# Patient Record
Sex: FEMALE | Race: WHITE | NOT HISPANIC OR LATINO | Employment: OTHER | URBAN - METROPOLITAN AREA
[De-identification: names, ages, dates, MRNs, and addresses within clinical notes are randomized per-mention and may not be internally consistent; named-entity substitution may affect disease eponyms.]

---

## 2017-01-03 ENCOUNTER — OFFICE VISIT (OUTPATIENT)
Dept: PODIATRY | Facility: CLINIC | Age: 42
End: 2017-01-03
Payer: COMMERCIAL

## 2017-01-03 VITALS — HEART RATE: 84 BPM | OXYGEN SATURATION: 99 %

## 2017-01-03 DIAGNOSIS — S92.302D CLOSED NONDISPLACED FRACTURE OF METATARSAL BONE OF LEFT FOOT WITH ROUTINE HEALING, UNSPECIFIED METATARSAL, SUBSEQUENT ENCOUNTER: Primary | ICD-10-CM

## 2017-01-03 PROCEDURE — 99207 ZZC FRACTURE CARE IN GLOBAL PERIOD: CPT | Performed by: PODIATRIST

## 2017-01-03 NOTE — NURSING NOTE
"Chief Complaint   Patient presents with     Follow Up For     Fracture     left foot       Initial Pulse 84  Wt   SpO2 99% Estimated body mass index is 56.44 kg/(m^2) as calculated from the following:    Height as of 11/23/16: 1.626 m (5' 4\").    Weight as of 11/23/16: 149.233 kg (329 lb).  BP completed using cuff size: NA (Not Taken)  "

## 2017-01-03 NOTE — PATIENT INSTRUCTIONS
We wish you continued good healing. If you have any questions or concerns, please do not hesitate to contact us at [phone].      Please remember to call and schedule a follow up appointment if one was recommended at your earliest convenience.   PODIATRY CLINIC HOURS  TELEPHONE NUMBER    Dr. Ravin Arevalo D.P.M [facility]    Clinics:  [facility]        Shell Watson MA  Medical Assistant  Tuesday 1PM-6PM  [facility]  Wednesday 7AM-2PM  [place]  Thursday 10AM-6PM  [place] Friday 7AM-345PM  [place]  Specialty schedulers:   [phone] to make an appointment with any Specialty Provider.        Urgent Care locations:    [facility] Monday-Friday 5 pm - 9 pm. Saturday-Sunday 9 am -5pm    Monday-Friday 11 am - 9 pm Saturday 9 am - 5 pm     Monday-Sunday 12 noon-8PM [phone]     If you need a medication refill, please contact us you may need lab work and/or a follow up visit prior to your refill (i.e. Antifungal medications).    [facility] (secure e-mail communication and access to your chart) to send a message or to make an appointment.    If MRI needed please call Larry Canales at [phone]        Weight management plan: Patient was referred to their PCP to discuss a diet and exercise plan.

## 2017-01-06 NOTE — PROGRESS NOTES
DATE OF VISIT:      11/29/2016   Bess Enamorado was referred to me by Yanet Gutierres PA-C at the [facility] ED on 11/23/2016.  The patient was attempting to step up into a vehicle with her right foot when the left foot which was on the ground gave out resulting in her falling.  She is unsure if she fell on her foot.  She immediately had pain and she had a snapping sensation.  It is very difficult for her to bear any weight on this at all.  She states she has a history of MS and falls are exacerbated by chronic intermittent muscle spasm.  She also has a history of a peroneal tendon rupture on this foot which was evaluated by [facility] in the past and an MRI was gotten.  The patient has MS.  She also has an eating disorder and she is trying to lose weight.  At that time she was seen by [facility] and they felt that it was best for her to try to lose weight first and then possibly undergo repair of her peroneal tendon.  She has an Aircast on her left leg which she states is comfortable.     12/6/16  Patient could not get MRI as her foot was moving to much from her MS.  She is taking the keflex with no problems.  States erythema and edema is decreasing    1/3/17  Patient feeling much better.  Edema and pain decreased.            REVIEW OF SYSTEMS:  The patient denies any fever or chills or shortness of breath.  She denies calf pain.          Allergies   Allergen Reactions     Nkda [No Known Drug Allergies]        Current Outpatient Prescriptions   Medication Sig Dispense Refill     desogestrel-ethinyl estradiol (APRI) 0.15-30 MG-MCG per tablet Take 1 tablet by mouth daily Take as continuous daily dosing (omit placebo pills until after three months of hormone tablets.) 112 tablet 3     metFORMIN (GLUCOPHAGE-XR) 500 MG 24 hr tablet Take 2 tablets (1,000 mg) by mouth daily (with dinner) 180 tablet 1     PARoxetine (PAXIL) 30 MG tablet Take 1 tablet (30 mg) by mouth every morning ) 90 tablet 1      TECFIDERA 240 MG CPDR Take 240 mg by mouth 2 times daily Prescribed and monitored by neurologist       Cholecalciferol (VITAMIN D3 PO) Take 2,000 Units by mouth daily       ibuprofen (IBU) 600 MG tablet Take by mouth 3 times daily       order for DME Equipment being ordered: Wheelchair   ISRAEL:4 weeks  Pt is Non-Weight bearing at all times. 1 Device 0       Patient Active Problem List   Diagnosis     Multiple sclerosis (H)     Polycystic ovaries     Constipation     CARDIOVASCULAR SCREENING; LDL GOAL LESS THAN 160     Anxiety     Restless legs     Leg edema     Dyspnea and respiratory abnormality     Eating disorder     Papanicolaou smear of cervix with low grade squamous intraepithelial lesion (LGSIL)     Morbid obesity (H)     Peroneal tendon rupture     Benign essential hypertension       Past Medical History   Diagnosis Date     Multiple sclerosis (H) 8/29/2005 9/18/200pt dx with MS 2004--dx by neurolgist and is followed by Dr. Harris     UNSPEC CONSTIPATION 9/18/2006 9/18/2006   Has tried otc suppository and otc lax.      LSIL on Pap smear 7/2014     Neg high risk HPV     ASCUS favor benign 8/2015     Neg HPV     H/O colposcopy with cervical biopsy 9/14/15     Bx & ECC - negative       Past Surgical History   Procedure Laterality Date     Cholecystectomy, laporoscopic       Cholecystectomy, Laparoscopic     Open reduction internal fixation toe(s)  4/13/2012     Procedure:OPEN REDUCTION INTERNAL FIXATION TOE(S); Open reduction internal fixation right proximal fifth metatarsal fracture-   Anes-choice block; Surgeon:LEY, JEFFREY DUANE; Location:[place] OR       Family History   Problem Relation Age of Onset     Neurologic Disorder Father      MS     HEART DISEASE Father      irregular heart rate     DIABETES Father      Melanoma Father      CANCER Maternal Grandfather      lung     CANCER Paternal Grandmother      stomach     CANCER Mother      Gynecology Maternal Aunt      PCOS       Social History    Substance Use Topics     Smoking status: Never Smoker      Smokeless tobacco: Never Used     Alcohol Use: Yes      Comment: social             PHYSICAL EXAMINATION:  She is 5 feet 4 inches tall and is morbidly obese.  Her weight is 329 pounds.  She has a BMI of 56.44.She has an SpO2 of 99%.  She is very pleasant to talk with today and in no apparent distress.  She has significant edema around both of her ankles and feet.  Because of this it is difficult to palpate her PT pulses.  Her DP pulses are palpable.  Her toes are warm to touch and her capillary refill is less than 3 seconds in all digits.  Light touch is intact in the digits.  Achilles reflexes are equal and symmetrical bilaterally.  Her muscle strength is weak bilaterally.  The lateral compartment of the left ankle is weaker compared to the right.  No gross forefoot or rearfoot deformities are noted.  The right foot exam is unremarkable.  On her left foot pain over fracture sites significantly decreased and now minimal.  Edema greatly decreased.  No erythema or ecchymosis.         MRI LEFT FOOT WITHOUT CONTRAST  12/7/2016 1:03 PM     HISTORY: Three weeks of pain following an injury. Fractures of the  second, third, and fourth metatarsals. Possible Lisfranc ligament  injury.     COMPARISON: Radiographs on 12/6/2016.     TECHNIQUE: Sagittal, coronal, and transverse T1 and STIR images were  obtained through the left foot with attention given to the midfoot and  metatarsals.       FINDINGS: There are nondisplaced transverse fractures through the  bases of the second, third, and fourth metatarsals with associated  marrow edema. There is also an oblique fracture involving the lateral  base of the first metatarsal with similar marrow edema. There is  moderate marrow edema within the majority of the cuboid. Although a  distinct fracture line is not seen involving the bone surface, I  cannot exclude some microfracturing of the internal trabecula. No  other  fracture or osseous lesion is seen. There are mild degenerative  changes in several tarsometatarsal joints resulting in mild marrow  edema in the distal aspect of the medial and intermediate cuneiforms.  Similar degenerative marrow edema is seen in the proximal aspect of  the lateral cuneiform.     No other joint space pathology is seen. Specifically, there is no  actual widening of the Lisfranc joints with no definite ligament  disruption seen.     The tendons and ligaments around the foot are intact and unremarkable.  There is a prominent plantar calcaneal spur with chronic-appearing  thickening of the posterior fibers of the plantar fascia. No current  abnormal signal is seen within the fascia with no tear demonstrated.  No other soft tissue abnormality is seen.                                                                       IMPRESSION:  1. Nondisplaced fractures of the bases of the first, second, third,  and fourth metatarsals. Additional marrow edema in the cuboid may  represent a bone contusion.  2. Degenerative changes as described above.  3. Although the above-mentioned fractures are in the adjacent bones, I  see no additional abnormality of the Lisfranc joints with no definite  ligament tear seen.  4. Chronic appearing plantar fasciitis including a prominent lateral  calcaneal spur.       ASSESSMENT:   1.  Fractures of the first, second, third and fourth metatarsals.   2.  Multiple  sclerosis.      PLAN:  Patient healing nicely.  Patient will start tomorrow walking in cam walker.  If there is no pain or edema then will slowly increase time in shoe 1-2 hours per day until walking all day.  If patient has pain or edema back to [facility] for 5 days and then will try again.  Return to clinic in 3 weeks.  Fracture care.            JULIA MIGUEL DPM

## 2017-01-26 ENCOUNTER — RADIANT APPOINTMENT (OUTPATIENT)
Dept: GENERAL RADIOLOGY | Facility: CLINIC | Age: 42
End: 2017-01-26
Attending: PODIATRIST
Payer: COMMERCIAL

## 2017-01-26 ENCOUNTER — OFFICE VISIT (OUTPATIENT)
Dept: PODIATRY | Facility: CLINIC | Age: 42
End: 2017-01-26
Payer: COMMERCIAL

## 2017-01-26 VITALS — HEIGHT: 64 IN | HEART RATE: 120 BPM | OXYGEN SATURATION: 99 %

## 2017-01-26 DIAGNOSIS — S92.302D CLOSED NONDISPLACED FRACTURE OF METATARSAL BONE OF LEFT FOOT WITH ROUTINE HEALING, UNSPECIFIED METATARSAL, SUBSEQUENT ENCOUNTER: Primary | ICD-10-CM

## 2017-01-26 DIAGNOSIS — M79.672 LEFT FOOT PAIN: ICD-10-CM

## 2017-01-26 DIAGNOSIS — S92.302D CLOSED NONDISPLACED FRACTURE OF METATARSAL BONE OF LEFT FOOT WITH ROUTINE HEALING, UNSPECIFIED METATARSAL, SUBSEQUENT ENCOUNTER: ICD-10-CM

## 2017-01-26 DIAGNOSIS — Q66.50 CONGENITAL PES PLANUS, UNSPECIFIED LATERALITY: ICD-10-CM

## 2017-01-26 PROCEDURE — 99207 ZZC FRACTURE CARE IN GLOBAL PERIOD: CPT | Performed by: PODIATRIST

## 2017-01-26 PROCEDURE — 73630 X-RAY EXAM OF FOOT: CPT | Mod: LT

## 2017-01-26 NOTE — NURSING NOTE
"Chief Complaint   Patient presents with     Follow Up For     Fracture     1,2,3,4th metatarsals       Initial Pulse 120  Ht 1.626 m (5' 4\")  SpO2 99% Estimated body mass index is 56.44 kg/(m^2) as calculated from the following:    Height as of this encounter: 1.626 m (5' 4\").    Weight as of 11/23/16: 149.233 kg (329 lb).  BP completed using cuff size: NA (Not Taken)  "

## 2017-01-26 NOTE — PROGRESS NOTES
DATE OF VISIT:      11/29/2016   Bess Enamorado was referred to me by Yanet Gutierres PA-C at the [facility] ED on 11/23/2016.  The patient was attempting to step up into a vehicle with her right foot when the left foot which was on the ground gave out resulting in her falling.  She is unsure if she fell on her foot.  She immediately had pain and she had a snapping sensation.  It is very difficult for her to bear any weight on this at all.  She states she has a history of MS and falls are exacerbated by chronic intermittent muscle spasm.  She also has a history of a peroneal tendon rupture on this foot which was evaluated by [facility] in the past and an MRI was gotten.  The patient has MS.  She also has an eating disorder and she is trying to lose weight.  At that time she was seen by [facility] and they felt that it was best for her to try to lose weight first and then possibly undergo repair of her peroneal tendon.  She has an Aircast on her left leg which she states is comfortable.     12/6/16  Patient could not get MRI as her foot was moving to much from her MS.  She is taking the keflex with no problems.  States erythema and edema is decreasing    1/3/17  Patient feeling much better.  Edema and pain decreased.       1/26/17  Patient states pain around fracture sites really does not bother her now.  Most of her pain is lateral ankle.  Aggravated by activity and relieved by rest.   There was some question as to weather she ruptured one of her peroneal tendons in the past and MRI inconclusive.  She has lateral pain.  She has MS and she states she did have a classic AFO on this foot at one time, but that was a few years ago.  She has also been going to physical therapy for her lateral foot pain.  She just wears regular shoes and does not have orthotics.  She is walking in an air cast now and states this is comfortable.           REVIEW OF SYSTEMS:  The patient denies any fever or chills or  shortness of breath.  She denies calf pain.          Allergies   Allergen Reactions     Nkda [No Known Drug Allergies]        Current Outpatient Prescriptions   Medication Sig Dispense Refill     desogestrel-ethinyl estradiol (APRI) 0.15-30 MG-MCG per tablet Take 1 tablet by mouth daily Take as continuous daily dosing (omit placebo pills until after three months of hormone tablets.) 112 tablet 3     metFORMIN (GLUCOPHAGE-XR) 500 MG 24 hr tablet Take 2 tablets (1,000 mg) by mouth daily (with dinner) 180 tablet 1     PARoxetine (PAXIL) 30 MG tablet Take 1 tablet (30 mg) by mouth every morning ) 90 tablet 1     TECFIDERA 240 MG CPDR Take 240 mg by mouth 2 times daily Prescribed and monitored by neurologist       Cholecalciferol (VITAMIN D3 PO) Take 2,000 Units by mouth daily       ibuprofen (IBU) 600 MG tablet Take by mouth 3 times daily       order for DME Equipment being ordered: Wheelchair   ISRAEL:4 weeks  Pt is Non-Weight bearing at all times. 1 Device 0       Patient Active Problem List   Diagnosis     Multiple sclerosis (H)     Polycystic ovaries     Constipation     CARDIOVASCULAR SCREENING; LDL GOAL LESS THAN 160     Anxiety     Restless legs     Leg edema     Dyspnea and respiratory abnormality     Eating disorder     Papanicolaou smear of cervix with low grade squamous intraepithelial lesion (LGSIL)     Morbid obesity (H)     Peroneal tendon rupture     Benign essential hypertension       Past Medical History   Diagnosis Date     Multiple sclerosis (H) 8/29/2005 9/18/200pt dx with MS 2004--dx by neurolgist and is followed by Dr. Harris     UNSPEC CONSTIPATION 9/18/2006 9/18/2006   Has tried otc suppository and otc lax.      LSIL on Pap smear 7/2014     Neg high risk HPV     ASCUS favor benign 8/2015     Neg HPV     H/O colposcopy with cervical biopsy 9/14/15     Bx & ECC - negative       Past Surgical History   Procedure Laterality Date     Cholecystectomy, laporoscopic       Cholecystectomy,  Laparoscopic     Open reduction internal fixation toe(s)  4/13/2012     Procedure:OPEN REDUCTION INTERNAL FIXATION TOE(S); Open reduction internal fixation right proximal fifth metatarsal fracture-   Anes-choice block; Surgeon:LEY, JEFFREY DUANE; Location:[place] OR       Family History   Problem Relation Age of Onset     Neurologic Disorder Father      MS     HEART DISEASE Father      irregular heart rate     DIABETES Father      Melanoma Father      CANCER Maternal Grandfather      lung     CANCER Paternal Grandmother      stomach     CANCER Mother      Gynecology Maternal Aunt      PCOS       Social History   Substance Use Topics     Smoking status: Never Smoker      Smokeless tobacco: Never Used     Alcohol Use: Yes      Comment: social             PHYSICAL EXAMINATION:  She is 5 feet 4 inches tall and is morbidly obese.  Her weight is 329 pounds.  She has a BMI of 56.44.She has an SpO2 of 99%.  She is very pleasant to talk with today and in no apparent distress.  She has significant edema around both of her ankles and feet.  Because of this it is difficult to palpate her PT pulses.  Her DP pulses are palpable.  Her toes are warm to touch and her capillary refill is less than 3 seconds in all digits.  Light touch is intact in the digits.  Achilles reflexes are equal and symmetrical bilaterally.  Her muscle strength is weak bilaterally.  The lateral compartment of the left ankle is weaker compared to the right.  No gross forefoot or rearfoot deformities are noted.  The right foot exam is unremarkable.  On her left foot pain over fracture sites pain significantly decreased and now minimal.  Edema greatly decreased.  No erythema or ecchymosis.  With significant loading of 1-4 met heads no pan at all, and used a lot of force since she has strong Achilles.  Can get to 5 degrees past dorsiflexion with time and effort, but again no pain with doing this.  Lateral pain in sinus tarsi and lateral ankle.  No pain with stress  either peroneal tendon.             MRI LEFT FOOT WITHOUT CONTRAST  12/7/2016 1:03 PM     HISTORY: Three weeks of pain following an injury. Fractures of the  second, third, and fourth metatarsals. Possible Lisfranc ligament  injury.     COMPARISON: Radiographs on 12/6/2016.     TECHNIQUE: Sagittal, coronal, and transverse T1 and STIR images were  obtained through the left foot with attention given to the midfoot and  metatarsals.       FINDINGS: There are nondisplaced transverse fractures through the  bases of the second, third, and fourth metatarsals with associated  marrow edema. There is also an oblique fracture involving the lateral  base of the first metatarsal with similar marrow edema. There is  moderate marrow edema within the majority of the cuboid. Although a  distinct fracture line is not seen involving the bone surface, I  cannot exclude some microfracturing of the internal trabecula. No  other fracture or osseous lesion is seen. There are mild degenerative  changes in several tarsometatarsal joints resulting in mild marrow  edema in the distal aspect of the medial and intermediate cuneiforms.  Similar degenerative marrow edema is seen in the proximal aspect of  the lateral cuneiform.     No other joint space pathology is seen. Specifically, there is no  actual widening of the Lisfranc joints with no definite ligament  disruption seen.     The tendons and ligaments around the foot are intact and unremarkable.  There is a prominent plantar calcaneal spur with chronic-appearing  thickening of the posterior fibers of the plantar fascia. No current  abnormal signal is seen within the fascia with no tear demonstrated.  No other soft tissue abnormality is seen.                                                                       IMPRESSION:  1. Nondisplaced fractures of the bases of the first, second, third,  and fourth metatarsals. Additional marrow edema in the cuboid may  represent a bone contusion.  2.  Degenerative changes as described above.  3. Although the above-mentioned fractures are in the adjacent bones, I  see no additional abnormality of the Lisfranc joints with no definite  ligament tear seen.  4. Chronic appearing plantar fasciitis including a prominent lateral  calcaneal spur.       ASSESSMENT:   1.  Fractures of the first, second, third and fourth metatarsals.   2.  Multiple  Sclerosis  3.  Lateral foot pain tendon rupture vs sinus tarsi syndrome from pronation      PLAN:  Fractures continue to heal and clinically no pain.  Will increase walking and will start walking in shoe once no pain in cam walker.  Called radiologist to discussed last 2 MRIs.  He notes not 100% clear if peroneus brevis tendon was ruptured, but is does disappear at some point in most recent MRI.  Edema in lateral malleolus consistent with sinus tarsi/lateral impingement.  Dispensed F8 ankle brace to see if this helps lateral pain.  Rx for [place] AFO to help with lateral pain and foot stability.   Return to clinic in 6 weeks.  25 minutes spent in total care of this patient for lateral pain ad half this time spent in face to face discussion.  Fracture care.          JULIA MIGUEL DPM

## 2017-01-26 NOTE — PATIENT INSTRUCTIONS
We wish you continued good healing. If you have any questions or concerns, please do not hesitate to contact us at [phone].      Please remember to call and schedule a follow up appointment if one was recommended at your earliest convenience.   PODIATRY CLINIC HOURS  TELEPHONE NUMBER    Dr. Ravin Arevalo D.P.M [facility]    Clinics:  [facility]        Shell Watson MA  Medical Assistant  Tuesday 1PM-6PM  [facility]  Wednesday 7AM-2PM  [place]  Thursday 10AM-6PM  [place] Friday 7AM-345PM  [place]  Specialty schedulers:   [phone] to make an appointment with any Specialty Provider.        Urgent Care locations:    [facility] Monday-Friday 5 pm - 9 pm. Saturday-Sunday 9 am -5pm    Monday-Friday 11 am - 9 pm Saturday 9 am - 5 pm     Monday-Sunday 12 noon-8PM [phone]     If you need a medication refill, please contact us you may need lab work and/or a follow up visit prior to your refill (i.e. Antifungal medications).    [facility] (secure e-mail communication and access to your chart) to send a message or to make an appointment.    If MRI needed please call Larry Canales at [phone]        Weight management plan: Patient was referred to their PCP to discuss a diet and exercise plan.

## 2017-03-09 ENCOUNTER — OFFICE VISIT (OUTPATIENT)
Dept: PODIATRY | Facility: CLINIC | Age: 42
End: 2017-03-09
Payer: COMMERCIAL

## 2017-03-09 VITALS — WEIGHT: 293 LBS | BODY MASS INDEX: 50.02 KG/M2 | HEIGHT: 64 IN

## 2017-03-09 DIAGNOSIS — S92.302D CLOSED NONDISPLACED FRACTURE OF METATARSAL BONE OF LEFT FOOT WITH ROUTINE HEALING, UNSPECIFIED METATARSAL, SUBSEQUENT ENCOUNTER: Primary | ICD-10-CM

## 2017-03-09 DIAGNOSIS — M79.672 LEFT FOOT PAIN: ICD-10-CM

## 2017-03-09 DIAGNOSIS — Q66.50 CONGENITAL PES PLANUS, UNSPECIFIED LATERALITY: ICD-10-CM

## 2017-03-09 PROCEDURE — 99213 OFFICE O/P EST LOW 20 MIN: CPT | Performed by: PODIATRIST

## 2017-03-09 NOTE — MR AVS SNAPSHOT
After Visit Summary   3/9/2017    Bess Enamorado    MRN: [id number]           Patient Information     Date Of Birth          1975        Visit Information        Provider Department      3/9/2017 1:00 PM Ravin Arevalo, DPM [facility]        Today's Diagnoses     Closed nondisplaced fracture of metatarsal bone of left foot with routine healing, unspecified metatarsal, subsequent encounter    -  1    Congenital pes planus, unspecified laterality        Left foot pain          Care Instructions    We wish you continued good healing. If you have any questions or concerns, please do not hesitate to contact us at [phone].      Please remember to call and schedule a follow up appointment if one was recommended at your earliest convenience.   PODIATRY CLINIC HOURS  TELEPHONE NUMBER    Dr. Ravin SHORTPANDRESSA [facility]    Clinics:  [place]  [place]  [facility]        Shell Watson MA  Medical Assistant  Tuesday 1PM-6PM  [place]  Wednesday 7AM-2PM  [place]  Thursday 10AM-6PM  [place]  Friday 7AM-345PM  [place]  Specialty schedulers:   [phone] to make an appointment with any Specialty Provider.        Urgent Care locations:    [facility] Monday-Friday 5 pm - 9 pm. Saturday-Sunday 9 am -5pm    Monday-Friday 11 am - 9 pm Saturday 9 am - 5 pm     Monday-Sunday 12 noon-8PM [phone]     If you need a medication refill, please contact us you may need lab work and/or a follow up visit prior to your refill (i.e. Antifungal medications).    [employer] (secure e-mail communication and access to your chart) to send a message or to make an appointment.    If MRI needed please call Larry Canales at [phone]        Weight management plan: Patient was referred to their PCP to discuss a diet and exercise plan.          Follow-ups after your visit        Who  "to contact     If you have questions or need follow up information about today's clinic visit or your schedule please contact [facility] directly at [phone].  Normal or non-critical lab and imaging results will be communicated to you by MyChart, letter or phone within 4 business days after the clinic has received the results. If you do not hear from us within 7 days, please contact the clinic through MyChart or phone. If you have a critical or abnormal lab result, we will notify you by phone as soon as possible.  Submit refill requests through [employer] or call your pharmacy and they will forward the refill request to us. Please allow 3 business days for your refill to be completed.          Additional Information About Your Visit        [employer] Information     [employer] gives you secure access to your electronic health record. If you see a primary care provider, you can also send messages to your care team and make appointments. If you have questions, please call your primary care clinic.  If you do not have a primary care provider, please call [phone] and they will assist you.        Care EveryWhere ID     This is your Care EveryWhere ID. This could be used by other organizations to access your [facility] medical records  [id number]        Your Vitals Were     Height BMI (Body Mass Index)                1.626 m (5' 4\") 55.61 kg/m2           Blood Pressure from Last 3 Encounters:   11/23/16 163/90   10/26/16 140/82   10/20/16 (!) 148/92    Weight from Last 3 Encounters:   03/09/17 (!) 147 kg (324 lb)   11/23/16 (!) 149.2 kg (329 lb)   10/20/16 (!) 158.8 kg (350 lb)              Today, you had the following     No orders found for display       Primary Care Provider Office Phone # Fax #    Jennifer Crowe [phone]       [facility] [address] [address] [facility] [address]        Thank you!     Thank you for choosing [facility]  for your care. Our " goal is always to provide you with excellent care. Hearing back from our patients is one way we can continue to improve our services. Please take a few minutes to complete the written survey that you may receive in the mail after your visit with us. Thank you!             Your Updated Medication List - Protect others around you: Learn how to safely use, store and throw away your medicines at www.disposemymeds.org.          This list is accurate as of: 3/9/17  1:09 PM.  Always use your most recent med list.                   Brand Name Dispense Instructions for use    desogestrel-ethinyl estradiol 0.15-30 MG-MCG per tablet    APRI    112 tablet    Take 1 tablet by mouth daily Take as continuous daily dosing (omit placebo pills until after three months of hormone tablets.)        MG tablet   Generic drug:  ibuprofen      Take by mouth 3 times daily       metFORMIN 500 MG 24 hr tablet    GLUCOPHAGE-XR    180 tablet    Take 2 tablets (1,000 mg) by mouth daily (with dinner)       order for DME     1 Device    Equipment being ordered: Wheelchair  ISRAEL:4 weeks Pt is Non-Weight bearing at all times.       PARoxetine 30 MG tablet    PAXIL    90 tablet    Take 1 tablet (30 mg) by mouth every morning )       TECFIDERA 240 MG Cpdr   Generic drug:  dimethyl fumarate      Take 240 mg by mouth 2 times daily Prescribed and monitored by neurologist       VITAMIN D3 PO      Take 2,000 Units by mouth daily

## 2017-03-09 NOTE — PROGRESS NOTES
DATE OF VISIT:      11/29/2016   Bess Enamorado was referred to me by Yanet Gutierres PA-C at the [facility] ED on 11/23/2016.  The patient was attempting to step up into a vehicle with her right foot when the left foot which was on the ground gave out resulting in her falling.  She is unsure if she fell on her foot.  She immediately had pain and she had a snapping sensation.  It is very difficult for her to bear any weight on this at all.  She states she has a history of MS and falls are exacerbated by chronic intermittent muscle spasm.  She also has a history of a peroneal tendon rupture on this foot which was evaluated by [facility] in the past and an MRI was gotten.  The patient has MS.  She also has an eating disorder and she is trying to lose weight.  At that time she was seen by [facility] and they felt that it was best for her to try to lose weight first and then possibly undergo repair of her peroneal tendon.  She has an Aircast on her left leg which she states is comfortable.     12/6/16  Patient could not get MRI as her foot was moving to much from her MS.  She is taking the keflex with no problems.  States erythema and edema is decreasing    1/3/17  Patient feeling much better.  Edema and pain decreased.       1/26/17  Patient states pain around fracture sites really does not bother her now.  Most of her pain is lateral ankle.  Aggravated by activity and relieved by rest.   There was some question as to weather she ruptured one of her peroneal tendons in the past and MRI inconclusive.  She has lateral pain.  She has MS and she states she did have a classic AFO on this foot at one time, but that was a few years ago.  She has also been going to physical therapy for her lateral foot pain.  She just wears regular shoes and does not have orthotics.  She is walking in an air cast now and states this is comfortable.    3/9/17  Patient has had AFO for 1 week.  It is very comfortable and no  pain with walking in this and fits into her shoes well.  Edema and pain at fracture sites gone.  Still some lateral pain but she thinks this is improving.             REVIEW OF SYSTEMS:  The patient denies any fever or chills or shortness of breath.  She denies calf pain.          Allergies   Allergen Reactions     Nkda [No Known Drug Allergies]        Current Outpatient Prescriptions   Medication Sig Dispense Refill     desogestrel-ethinyl estradiol (APRI) 0.15-30 MG-MCG per tablet Take 1 tablet by mouth daily Take as continuous daily dosing (omit placebo pills until after three months of hormone tablets.) 112 tablet 3     metFORMIN (GLUCOPHAGE-XR) 500 MG 24 hr tablet Take 2 tablets (1,000 mg) by mouth daily (with dinner) 180 tablet 1     PARoxetine (PAXIL) 30 MG tablet Take 1 tablet (30 mg) by mouth every morning ) 90 tablet 1     TECFIDERA 240 MG CPDR Take 240 mg by mouth 2 times daily Prescribed and monitored by neurologist       Cholecalciferol (VITAMIN D3 PO) Take 2,000 Units by mouth daily       ibuprofen (IBU) 600 MG tablet Take by mouth 3 times daily       order for DME Equipment being ordered: Wheelchair   ISRAEL:4 weeks  Pt is Non-Weight bearing at all times. 1 Device 0       Patient Active Problem List   Diagnosis     Multiple sclerosis (H)     Polycystic ovaries     Constipation     CARDIOVASCULAR SCREENING; LDL GOAL LESS THAN 160     Anxiety     Restless legs     Leg edema     Dyspnea and respiratory abnormality     Eating disorder     Papanicolaou smear of cervix with low grade squamous intraepithelial lesion (LGSIL)     Morbid obesity (H)     Peroneal tendon rupture     Benign essential hypertension       Past Medical History   Diagnosis Date     ASCUS favor benign 8/2015     Neg HPV     H/O colposcopy with cervical biopsy 9/14/15     Bx & ECC - negative     LSIL on Pap smear 7/2014     Neg high risk HPV     Multiple sclerosis (H) 8/29/2005 9/18/200pt dx with MS 2004--dx by neurolgist and is  followed by Dr. Harris     UNSPEC CONSTIPATION 9/18/2006 9/18/2006   Has tried otc suppository and otc lax.        Past Surgical History   Procedure Laterality Date     Cholecystectomy, laporoscopic       Cholecystectomy, Laparoscopic     Open reduction internal fixation toe(s)  4/13/2012     Procedure:OPEN REDUCTION INTERNAL FIXATION TOE(S); Open reduction internal fixation right proximal fifth metatarsal fracture-   Anes-choice block; Surgeon:LEY, JEFFREY DUANE; Location:[place] OR       Family History   Problem Relation Age of Onset     Neurologic Disorder Father      MS     HEART DISEASE Father      irregular heart rate     DIABETES Father      Melanoma Father      CANCER Maternal Grandfather      lung     CANCER Paternal Grandmother      stomach     CANCER Mother      Gynecology Maternal Aunt      PCOS       Social History   Substance Use Topics     Smoking status: Never Smoker     Smokeless tobacco: Never Used     Alcohol use Yes      Comment: social             PHYSICAL EXAMINATION:  She is 5 feet 4 inches tall and is morbidly obese.  Her weight is 329 pounds.  She has a BMI of 56.44.She has an SpO2 of 99%.  She is very pleasant to talk with today and in no apparent distress.  She has significant edema around both of her ankles and feet.  Because of this it is difficult to palpate her PT pulses.  Her DP pulses are palpable.  Her toes are warm to touch and her capillary refill is less than 3 seconds in all digits.  Light touch is intact in the digits.  Achilles reflexes are equal and symmetrical bilaterally.  Her muscle strength is weak bilaterally.  The lateral compartment of the left ankle is weaker compared to the right.  No gross forefoot or rearfoot deformities are noted.  The right foot exam is unremarkable.  On her left foot pain over fracture sites no pain or edema now.   Can get to 5 degrees past dorsiflexion of ankle with time and effort, but again no pain with doing this.  Lateral pain in sinus  tarsi and lateral ankle, but slight today.  No pain with stress either peroneal tendon.             MRI LEFT FOOT WITHOUT CONTRAST  12/7/2016 1:03 PM     HISTORY: Three weeks of pain following an injury. Fractures of the  second, third, and fourth metatarsals. Possible Lisfranc ligament  injury.     COMPARISON: Radiographs on 12/6/2016.     TECHNIQUE: Sagittal, coronal, and transverse T1 and STIR images were  obtained through the left foot with attention given to the midfoot and  metatarsals.       FINDINGS: There are nondisplaced transverse fractures through the  bases of the second, third, and fourth metatarsals with associated  marrow edema. There is also an oblique fracture involving the lateral  base of the first metatarsal with similar marrow edema. There is  moderate marrow edema within the majority of the cuboid. Although a  distinct fracture line is not seen involving the bone surface, I  cannot exclude some microfracturing of the internal trabecula. No  other fracture or osseous lesion is seen. There are mild degenerative  changes in several tarsometatarsal joints resulting in mild marrow  edema in the distal aspect of the medial and intermediate cuneiforms.  Similar degenerative marrow edema is seen in the proximal aspect of  the lateral cuneiform.     No other joint space pathology is seen. Specifically, there is no  actual widening of the Lisfranc joints with no definite ligament  disruption seen.     The tendons and ligaments around the foot are intact and unremarkable.  There is a prominent plantar calcaneal spur with chronic-appearing  thickening of the posterior fibers of the plantar fascia. No current  abnormal signal is seen within the fascia with no tear demonstrated.  No other soft tissue abnormality is seen.                                                                       IMPRESSION:  1. Nondisplaced fractures of the bases of the first, second, third,  and fourth metatarsals. Additional  marrow edema in the cuboid may  represent a bone contusion.  2. Degenerative changes as described above.  3. Although the above-mentioned fractures are in the adjacent bones, I  see no additional abnormality of the Lisfranc joints with no definite  ligament tear seen.  4. Chronic appearing plantar fasciitis including a prominent lateral  calcaneal spur.       ASSESSMENT:   1.  Fractures of the first, second, third and fourth metatarsals, now healed.   2.  Multiple  Sclerosis  3.  Lateral foot pain tendon rupture vs sinus tarsi syndrome from pronation      PLAN:  Fractures healed.  Continue [place] AFO at all times to help with lateral pain and foot stability.   Will call if she needs new [place] AFO in the future.  Return to clinic prn.          JULIA MIGUEL DPM

## 2017-03-09 NOTE — PATIENT INSTRUCTIONS
We wish you continued good healing. If you have any questions or concerns, please do not hesitate to contact us at [phone].      Please remember to call and schedule a follow up appointment if one was recommended at your earliest convenience.   PODIATRY CLINIC HOURS  TELEPHONE NUMBER    Dr. Ravin Arevalo D.P.[facility]    Clinics:  [facility]        Shell Watson MA  Medical Assistant  Tuesday 1PM-6PM  [facility]  Wednesday 7AM-2PM  [place]  Thursday 10AM-6PM  [place] Friday 7AM-345PM  [place]  Specialty schedulers:   [phone] to make an appointment with any Specialty Provider.        Urgent Care locations:    [facility] Monday-Friday 5 pm - 9 pm. Saturday-Sunday 9 am -5pm    Monday-Friday 11 am - 9 pm Saturday 9 am - 5 pm     Monday-Sunday 12 noon-8PM [phone]     If you need a medication refill, please contact us you may need lab work and/or a follow up visit prior to your refill (i.e. Antifungal medications).    [employer] (secure e-mail communication and access to your chart) to send a message or to make an appointment.    If MRI needed please call Larry Canales at [phone]        Weight management plan: Patient was referred to their PCP to discuss a diet and exercise plan.

## 2017-03-09 NOTE — NURSING NOTE
"Chief Complaint   Patient presents with     Follow Up For     Fracture     left foot       Initial Ht 1.626 m (5' 4\")  Wt (!) 147 kg (324 lb)  BMI 55.61 kg/m2 Estimated body mass index is 55.61 kg/(m^2) as calculated from the following:    Height as of this encounter: 1.626 m (5' 4\").    Weight as of this encounter: 147 kg (324 lb).  Medication Reconciliation: complete  "

## 2017-03-21 DIAGNOSIS — Z79.899 NEED FOR PROPHYLACTIC CHEMOTHERAPY: ICD-10-CM

## 2017-03-21 DIAGNOSIS — G35 MULTIPLE SCLEROSIS (H): ICD-10-CM

## 2017-03-21 LAB
ALT SERPL W P-5'-P-CCNC: 54 U/L (ref 0–50)
AST SERPL W P-5'-P-CCNC: 29 U/L (ref 0–45)
BASOPHILS # BLD AUTO: 0 10E9/L (ref 0–0.2)
BASOPHILS NFR BLD AUTO: 0.6 %
CALCIUM SERPL-MCNC: 9.3 MG/DL (ref 8.5–10.1)
CREAT SERPL-MCNC: 0.66 MG/DL (ref 0.52–1.04)
DIFFERENTIAL METHOD BLD: NORMAL
EOSINOPHIL # BLD AUTO: 0.2 10E9/L (ref 0–0.7)
EOSINOPHIL NFR BLD AUTO: 2.4 %
ERYTHROCYTE [DISTWIDTH] IN BLOOD BY AUTOMATED COUNT: 13.7 % (ref 10–15)
GFR SERPL CREATININE-BSD FRML MDRD: NORMAL ML/MIN/1.7M2
HCT VFR BLD AUTO: 42.4 % (ref 35–47)
HGB BLD-MCNC: 13.9 G/DL (ref 11.7–15.7)
LYMPHOCYTES # BLD AUTO: 0.8 10E9/L (ref 0.8–5.3)
LYMPHOCYTES NFR BLD AUTO: 13.5 %
MCH RBC QN AUTO: 28 PG (ref 26.5–33)
MCHC RBC AUTO-ENTMCNC: 32.8 G/DL (ref 31.5–36.5)
MCV RBC AUTO: 86 FL (ref 78–100)
MONOCYTES # BLD AUTO: 0.6 10E9/L (ref 0–1.3)
MONOCYTES NFR BLD AUTO: 9.4 %
NEUTROPHILS # BLD AUTO: 4.6 10E9/L (ref 1.6–8.3)
NEUTROPHILS NFR BLD AUTO: 74.1 %
PLATELET # BLD AUTO: 247 10E9/L (ref 150–450)
RBC # BLD AUTO: 4.96 10E12/L (ref 3.8–5.2)
WBC # BLD AUTO: 6.2 10E9/L (ref 4–11)

## 2017-03-21 PROCEDURE — 82565 ASSAY OF CREATININE: CPT | Performed by: PSYCHIATRY & NEUROLOGY

## 2017-03-21 PROCEDURE — 84450 TRANSFERASE (AST) (SGOT): CPT | Performed by: PSYCHIATRY & NEUROLOGY

## 2017-03-21 PROCEDURE — 36415 COLL VENOUS BLD VENIPUNCTURE: CPT | Performed by: PSYCHIATRY & NEUROLOGY

## 2017-03-21 PROCEDURE — 82306 VITAMIN D 25 HYDROXY: CPT | Performed by: PSYCHIATRY & NEUROLOGY

## 2017-03-21 PROCEDURE — 85025 COMPLETE CBC W/AUTO DIFF WBC: CPT | Performed by: PSYCHIATRY & NEUROLOGY

## 2017-03-21 PROCEDURE — 82310 ASSAY OF CALCIUM: CPT | Performed by: PSYCHIATRY & NEUROLOGY

## 2017-03-21 PROCEDURE — 84460 ALANINE AMINO (ALT) (SGPT): CPT | Performed by: PSYCHIATRY & NEUROLOGY

## 2017-03-22 LAB — DEPRECATED CALCIDIOL+CALCIFEROL SERPL-MC: 33 UG/L (ref 20–75)

## 2017-04-25 DIAGNOSIS — F50.9 EATING DISORDER: Primary | ICD-10-CM

## 2017-04-25 DIAGNOSIS — F41.9 ANXIETY: ICD-10-CM

## 2017-04-25 NOTE — LETTER
[facility]  [address]  [facility] [address]  Phone: [phone]    April 28, 2017    Bess DOBBS Fei                                                                                                                  [address] [facility] [address]            Dear Ms. Enamorado,    We are concerned about your health care.  We recently provided you with a medication refill.  Many medications require routine follow-up with your Doctor.      At this time we ask that: You schedule a routine office visit with your physician.   Per 10-20-16 Office visit dictation:   plan clinic visit in about 6 months to recheck weight and Hemoglobin A1C and Blood Pressure.    Your prescription: Has been refilled for 1 month so you may have time for the above noted follow-up. Please be seen prior to needing your next refill of medication.         Thank you,      Jennifer Crowe MD/ [facility]

## 2017-04-25 NOTE — TELEPHONE ENCOUNTER
Paxil     Last Written Prescription Date: 10/20/2016  Last Fill Quantity: 90, # refills: 1  [facility] Office Visit with [facility] primary care provider:  10/20/2016        Last PHQ-9 score on record=   PHQ-9 SCORE 11/14/2016   Total Score 2

## 2017-04-28 RX ORDER — PAROXETINE 30 MG/1
TABLET, FILM COATED ORAL
Qty: 30 TABLET | Refills: 0 | Status: SHIPPED | OUTPATIENT
Start: 2017-04-28 | End: 2017-06-21

## 2017-04-28 NOTE — TELEPHONE ENCOUNTER
Needs appointment.Per  dictation: plan clinic visit in about 6 months to recheck weight and A1C and BP.  Letter sent and note sent to pharmacy as well. Diane Escalante RN

## 2017-06-21 DIAGNOSIS — F41.9 ANXIETY: ICD-10-CM

## 2017-06-21 RX ORDER — PAROXETINE 30 MG/1
30 TABLET, FILM COATED ORAL EVERY MORNING
Qty: 30 TABLET | Refills: 0 | Status: SHIPPED | OUTPATIENT
Start: 2017-06-21 | End: 2017-06-26

## 2017-06-21 NOTE — TELEPHONE ENCOUNTER
Covering provider - are you able to refill please?  Routing refill request to provider for review/approval because:  Valeria given x1 and patient did not follow up, please advise  She is scheduled to see Dr. Crowe 6-26-17 and does not have enough medication to last until appt.    Tasha JOHN RN

## 2017-06-21 NOTE — TELEPHONE ENCOUNTER
Reason for Call:  Medication or medication refill:    Do you use a [facility]?  Name of the pharmacy and phone number for the current request:  [facility] [place] [phone]    Name of the medication requested: Paxil - Pt has an appt to see Dr. Crowe 06/26/17 and she needs a small refill to get her through until Monday.  No need to call patient back, unless there are questions or problems.    Paxil       Last Written Prescription Date: 04/28/17  Last Fill Quantity: 30; # refills: 0  Last Office Visit with [facility], P or [facility] prescribing provider:  10/20/16   Next 5 appointments (look out 90 days)     Jun 26, 2017  1:00 PM CDT   PHYSICAL with Jennifer Crowe MD   [facility] ([facility])    [address] [facility] [address]   [phone]                   Last PHQ-9 score on record=   PHQ-9 SCORE 11/14/2016   Total Score 2       Lab Results   Component Value Date    AST 29 03/21/2017     Lab Results   Component Value Date    ALT 54 03/21/2017     Other request:     Can we leave a detailed message on this number? YES    Phone number patient can be reached at: Home number on file [phone] (home)    Best Time: any    Call taken on 6/21/2017 at 10:45 AM by Jewels Padilla

## 2017-06-26 ENCOUNTER — OFFICE VISIT (OUTPATIENT)
Dept: FAMILY MEDICINE | Facility: CLINIC | Age: 42
End: 2017-06-26
Payer: COMMERCIAL

## 2017-06-26 VITALS
SYSTOLIC BLOOD PRESSURE: 147 MMHG | HEART RATE: 101 BPM | WEIGHT: 293 LBS | BODY MASS INDEX: 56.64 KG/M2 | DIASTOLIC BLOOD PRESSURE: 88 MMHG

## 2017-06-26 DIAGNOSIS — E28.2 POLYCYSTIC OVARIES: ICD-10-CM

## 2017-06-26 DIAGNOSIS — E28.2 PCOS (POLYCYSTIC OVARIAN SYNDROME): ICD-10-CM

## 2017-06-26 DIAGNOSIS — E66.01 MORBID OBESITY DUE TO EXCESS CALORIES (H): ICD-10-CM

## 2017-06-26 DIAGNOSIS — F50.9 EATING DISORDER: ICD-10-CM

## 2017-06-26 DIAGNOSIS — Z82.0 FAMILY HISTORY OF SLEEP APNEA: ICD-10-CM

## 2017-06-26 DIAGNOSIS — R06.83 SNORING: ICD-10-CM

## 2017-06-26 DIAGNOSIS — F41.9 ANXIETY: ICD-10-CM

## 2017-06-26 DIAGNOSIS — Z00.00 ENCOUNTER FOR ROUTINE ADULT HEALTH EXAMINATION WITHOUT ABNORMAL FINDINGS: Primary | ICD-10-CM

## 2017-06-26 DIAGNOSIS — Z30.41 ENCOUNTER FOR SURVEILLANCE OF CONTRACEPTIVE PILLS: ICD-10-CM

## 2017-06-26 LAB — HBA1C MFR BLD: 5.6 % (ref 4.3–6)

## 2017-06-26 PROCEDURE — 36415 COLL VENOUS BLD VENIPUNCTURE: CPT | Performed by: FAMILY MEDICINE

## 2017-06-26 PROCEDURE — 99396 PREV VISIT EST AGE 40-64: CPT | Performed by: FAMILY MEDICINE

## 2017-06-26 PROCEDURE — 83036 HEMOGLOBIN GLYCOSYLATED A1C: CPT | Performed by: FAMILY MEDICINE

## 2017-06-26 RX ORDER — METFORMIN HCL 500 MG
1000 TABLET, EXTENDED RELEASE 24 HR ORAL
Qty: 180 TABLET | Refills: 1 | Status: CANCELLED | OUTPATIENT
Start: 2017-06-26

## 2017-06-26 RX ORDER — PAROXETINE 30 MG/1
30 TABLET, FILM COATED ORAL EVERY MORNING
Qty: 90 TABLET | Refills: 1 | Status: SHIPPED | OUTPATIENT
Start: 2017-06-26 | End: 2018-02-26

## 2017-06-26 RX ORDER — DESOGESTREL AND ETHINYL ESTRADIOL 0.15-0.03
1 KIT ORAL DAILY
Qty: 112 TABLET | Refills: 3 | Status: SHIPPED | OUTPATIENT
Start: 2017-06-26 | End: 2018-09-13

## 2017-06-26 RX ORDER — METFORMIN HCL 500 MG
1000 TABLET, EXTENDED RELEASE 24 HR ORAL
Qty: 180 TABLET | Refills: 1 | Status: SHIPPED | OUTPATIENT
Start: 2017-06-26 | End: 2018-07-03

## 2017-06-26 ASSESSMENT — ANXIETY QUESTIONNAIRES
5. BEING SO RESTLESS THAT IT IS HARD TO SIT STILL: NOT AT ALL
7. FEELING AFRAID AS IF SOMETHING AWFUL MIGHT HAPPEN: NOT AT ALL
GAD7 TOTAL SCORE: 0
6. BECOMING EASILY ANNOYED OR IRRITABLE: NOT AT ALL
1. FEELING NERVOUS, ANXIOUS, OR ON EDGE: NOT AT ALL
IF YOU CHECKED OFF ANY PROBLEMS ON THIS QUESTIONNAIRE, HOW DIFFICULT HAVE THESE PROBLEMS MADE IT FOR YOU TO DO YOUR WORK, TAKE CARE OF THINGS AT HOME, OR GET ALONG WITH OTHER PEOPLE: NOT DIFFICULT AT ALL
3. WORRYING TOO MUCH ABOUT DIFFERENT THINGS: NOT AT ALL
2. NOT BEING ABLE TO STOP OR CONTROL WORRYING: NOT AT ALL

## 2017-06-26 ASSESSMENT — PATIENT HEALTH QUESTIONNAIRE - PHQ9: 5. POOR APPETITE OR OVEREATING: NOT AT ALL

## 2017-06-26 NOTE — PATIENT INSTRUCTIONS
Preventive Health Recommendations  Female Ages 40 to 49    Yearly exam:     See your health care provider every year in order to  1. Review health changes.   2. Discuss preventive care.    3. Review your medicines if your doctor prescribed any.      Get a Pap test every three years (unless you have an abnormal result and your provider advises testing more often).      If you get Pap tests with HPV test, you only need to test every 5 years, unless you have an abnormal result. You do not need a Pap test if your uterus was removed (hysterectomy) and you have not had cancer.      You should be tested each year for STDs (sexually transmitted diseases), if you're at risk.       Ask your doctor if you should have a mammogram.      Have a colonoscopy (test for colon cancer) if someone in your family has had colon cancer or polyps before age 50.       Have a cholesterol test every 5 years.       Have a diabetes test (fasting glucose) after age 45. If you are at risk for diabetes, you should have this test every 3 years.    Shots: Get a flu shot each year. Get a tetanus shot every 10 years.     Nutrition:     Eat at least 5 servings of fruits and vegetables each day.    Eat whole-grain bread, whole-wheat pasta and brown rice instead of white grains and rice.    Talk to your provider about Calcium and Vitamin D.     Lifestyle    Exercise at least 150 minutes a week (an average of 30 minutes a day, 5 days a week). This will help you control your weight and prevent disease.    Limit alcohol to one drink per day.    No smoking.     Wear sunscreen to prevent skin cancer.    See your dentist every six months for an exam and cleaning.    Thank you for choosing [facility].  You may be receiving a survey in the mail from Amy Masters regarding your visit today.  Please take a few minutes to complete and return the survey to let us know how we are doing.  Our Clinic hours are:  Mondays    7:20 am - 7 pm  Tues -  Fri  7:20 am - 5  pm  Clinic Phone: [phone]  The clinic lab opens at 7:30 am Mon - Fri and appointments are required.  [facility]. [phone]  Monday-Thursday 8 am - 7pm  Tues/Wed/Fri 8 am - 5:30 pm

## 2017-06-26 NOTE — PROGRESS NOTES
SUBJECTIVE:     CC: Bess Enamorado is an 41 year old woman who presents for preventive health visit.     Chief Complaint   Patient presents with     Physical     medication refills -discuss getting back on birth control     Answers for HPI/ROS submitted by the patient on 6/23/2017   Annual Exam:  Getting at least 3 servings of Calcium per day:: Yes  Bi-annual eye exam:: Yes  Dental care twice a year:: NO  Sleep apnea or symptoms of sleep apnea:: Excessive snoring  Diet:: Regular (no restrictions)  Frequency of exercise:: None  Taking medications regularly:: Yes  Medication side effects:: Significant flushing with Tecfidera (MS medication)  Additional concerns today:: YES   PHQ-2 Score: 2    Today's PHQ-2 Score:   PHQ-2 ( 1999 Pf[employer]) 6/23/2017 10/20/2016   Q1: Little interest or pleasure in doing things 1 0   Q2: Feeling down, depressed or hopeless 1 0   PHQ-2 Score 2 0   Q1: Little interest or pleasure in doing things Several days -   Q2: Feeling down, depressed or hopeless Several days -   PHQ-2 Score 2 -     Abuse: Current or Past(Physical, Sexual or Emotional)- No  Do you feel safe in your environment - Yes    Social History   Substance Use Topics     Smoking status: Never Smoker     Smokeless tobacco: Never Used     Alcohol use Yes      Comment: social     The patient does not drink >3 drinks per day nor >7 drinks per week.    Recent Labs   Lab Test  02/11/16   0942  08/20/15   1206   11/07/11   0839   CHOL  162  170   < >  167   HDL  64  56   --   37*   LDL  75  102   --   112   TRIG  113   --    --   86   CHOLHDLRATIO   --    --    --   4.0   NHDL  98   --    --    --     < > = values in this interval not displayed.     Reviewed orders with patient.  Reviewed health maintenance and updated orders accordingly - Yes    Mammo Decision Support:  Patient under age 50, mutual decision reflected in health maintenance.      Pertinent mammograms are reviewed under the imaging tab.  History of abnormal Pap  smear:  Overview Addendum 10/29/2016 12:39 PM by Sandra Norman RN      7/29/2014:Pap--LSIL. Neg high risk HPV. Plan cotest in 1 year (if this is ASCUS or LSIL then colp). In reminders  8/20/15: Pap - ASCUS, Neg HPV. Plan colp  9/14/15: [place] Bx & ECC - negative. Plan cotest in 1 year.   10/20/16: NIL Pap, Neg HPV. Plan cotest in 3 years         Reviewed and updated as needed this visit by clinical staff  Tobacco  Allergies  Meds         Reviewed and updated as needed this visit by Provider        Bess Enamorado is a 41 year old female here for a health maintenance exam.  She has restarted the [facility] program in [place]. She had started this before, then had a peroneal tendon rupture that put her out of a commission for a while and she dropped out of the program.    She is followed by her neurologist for her MS, and he is performing regular labs to monitor her Tecfidera.  She has been diagnosed with PCOS for which she is on metformin.    She has borderline or mild hypertension, but given her intention to lose weight and the likely success of the [facility] program, we are just monitoring this; even a 5-10% weight loss may make a significant difference.    She is not currently sexually active, but remains on Apri for the PCOS and menstrual control. She is on continuous daily dosing [id number], but will often get a breakthrough bleed at 6 weeks.    She is morbidly obese, admits to a history of snoring and a paternal history of sleep apnea. She consents to consultation with the sleep clinic.    ROS:  C: NEGATIVE for fever, chills, change in weight  I: NEGATIVE for worrisome rashes, moles or lesions  E: NEGATIVE for vision changes or irritation  ENT: NEGATIVE for ear, mouth and throat problems  R: NEGATIVE for significant cough or SOB  B: NEGATIVE for masses, tenderness or discharge  CV: NEGATIVE for chest pain, palpitations or peripheral edema  GI: NEGATIVE for nausea, abdominal pain, heartburn, or change in bowel  habits  : NEGATIVE for unusual urinary or vaginal symptoms. Periods are regular.  M: NEGATIVE for significant arthralgias or myalgia  NEURO: Her MS has been stable  P: NEGATIVE for changes in mood or affect      OBJECTIVE:     /88 (BP Location: Right arm, Patient Position: Chair, Cuff Size: Adult Regular)  Pulse 101  Wt (!) 330 lb (149.7 kg)  BMI 56.64 kg/m2  EXAM:  GENERAL: healthy, alert, no distress and obese  EYES: Eyes grossly normal to inspection, PERRL and conjunctivae and sclerae normal  HENT: normal cephalic/atraumatic, both ears: normal TMs with some scarring, nose and mouth without ulcers or lesions, oropharynx clear and oral mucous membranes moist  NECK: no adenopathy, no asymmetry, masses, or scars and thyroid normal to palpation  RESP: lungs clear to auscultation - no rales, rhonchi or wheezes  BREAST: normal without masses, tenderness or nipple discharge and no palpable axillary masses or adenopathy  CV: regular rate and rhythm, normal S1 S2, no S3 or S4, no murmur, click or rub, no peripheral edema and peripheral pulses strong  ABDOMEN: soft, nontender, without hepatosplenomegaly or masses, bowel sounds normal and morbid obesity  MS: no gross musculoskeletal defects noted, no edema  SKIN: no suspicious lesions or rashes, no active intertrigo  PSYCH: mentation appears normal, affect normal/bright    Results for orders placed or performed in visit on 06/26/17   Hemoglobin A1c   Result Value Ref Range    Hemoglobin A1C 5.6 4.3 - 6.0 %         ASSESSMENT/PLAN:         ICD-10-CM    1. Encounter for routine adult health examination without abnormal findings Z00.00    2. Polycystic ovaries E28.2 desogestrel-ethinyl estradiol (APRI) 0.15-30 MG-MCG per tablet   3. Encounter for surveillance of contraceptive pills Z30.41 desogestrel-ethinyl estradiol (APRI) 0.15-30 MG-MCG per tablet   4. Anxiety F41.9 PARoxetine (PAXIL) 30 MG tablet   5. PCOS (polycystic ovarian syndrome) E28.2 metFORMIN  "(GLUCOPHAGE-XR) 500 MG 24 hr tablet   6. Eating disorder F50.9 Hemoglobin A1c   7. Snoring R06.83 SLEEP EVALUATION & MANAGEMENT REFERRAL - ADULT   8. Morbid obesity due to excess calories (H) E66.01 SLEEP EVALUATION & MANAGEMENT REFERRAL - ADULT   9. Family history of sleep apnea Z82.0 SLEEP EVALUATION & MANAGEMENT REFERRAL - ADULT       COUNSELING:   Reviewed preventive health counseling, as reflected in patient instructions       Healthy diet/nutrition    BP Screening:   Last 3 BP Readings:    BP Readings from Last 3 Encounters:   06/26/17 147/88   11/23/16 163/90   10/26/16 140/82       The following was recommended to the patient:  Low sodium diet  Congratulated on decision to enter the [employer] program     reports that she has never smoked. She has never used smokeless tobacco.    Estimated body mass index is 56.64 kg/(m^2) as calculated from the following:    Height as of 3/9/17: 5' 4\" (1.626 m).    Weight as of this encounter: 330 lb (149.7 kg).   Weight management plan: See above.    Counseling Resources:  ATP IV Guidelines  Pooled Cohorts Equation Calculator  Breast Cancer Risk Calculator  FRAX Risk Assessment  ICSI Preventive Guidelines  Dietary Guidelines for Americans, 2010  USDA's MyPlate  ASA Prophylaxis  Lung CA Screening    Jennifer Crowe MD  [facility]  "

## 2017-06-26 NOTE — MR AVS SNAPSHOT
After Visit Summary   6/26/2017    Bess Enamorado    MRN: [id number]           Patient Information     Date Of Birth          1975        Visit Information        Provider Department      6/26/2017 1:00 PM Jennifer Crowe MD [facility]        Today's Diagnoses     Encounter for routine adult health examination without abnormal findings    -  1    Polycystic ovaries        Encounter for surveillance of contraceptive pills        Anxiety        PCOS (polycystic ovarian syndrome)        Eating disorder        Snoring        Morbid obesity due to excess calories (H)        Family history of sleep apnea          Care Instructions      Preventive Health Recommendations  Female Ages 40 to 49    Yearly exam:     See your health care provider every year in order to  1. Review health changes.   2. Discuss preventive care.    3. Review your medicines if your doctor prescribed any.      Get a Pap test every three years (unless you have an abnormal result and your provider advises testing more often).      If you get Pap tests with HPV test, you only need to test every 5 years, unless you have an abnormal result. You do not need a Pap test if your uterus was removed (hysterectomy) and you have not had cancer.      You should be tested each year for STDs (sexually transmitted diseases), if you're at risk.       Ask your doctor if you should have a mammogram.      Have a colonoscopy (test for colon cancer) if someone in your family has had colon cancer or polyps before age 50.       Have a cholesterol test every 5 years.       Have a diabetes test (fasting glucose) after age 45. If you are at risk for diabetes, you should have this test every 3 years.    Shots: Get a flu shot each year. Get a tetanus shot every 10 years.     Nutrition:     Eat at least 5 servings of fruits and vegetables each day.    Eat whole-grain bread, whole-wheat pasta and brown rice instead of white grains and  rice.    Talk to your provider about Calcium and Vitamin D.     Lifestyle    Exercise at least 150 minutes a week (an average of 30 minutes a day, 5 days a week). This will help you control your weight and prevent disease.    Limit alcohol to one drink per day.    No smoking.     Wear sunscreen to prevent skin cancer.    See your dentist every six months for an exam and cleaning.    Thank you for choosing [facility] ([facility]3].  You may be receiving a survey in the mail from [employer] regarding your visit today.  Please take a few minutes to complete and return the survey to let us know how we are doing.  Our Clinic hours are:  Mondays    7:20 am - 7 pm  Tues -  Fri  7:20 am - 5 pm  Clinic Phone: [phone]  The clinic lab opens at 7:30 am Mon - Fri and appointments are required.  [facility]  Ph. [phone]  Monday-Thursday 8 am - 7pm  Tues/Wed/Fri 8 am - 5:30 pm            Follow-ups after your visit        Additional Services     SLEEP EVALUATION & MANAGEMENT REFERRAL - ADULT       Please be aware that coverage of these services is subject to the terms and limitations of your health insurance plan.  Call member services at your health plan with any benefit or coverage questions.      Please bring the following to your appointment:    >>   List of current medications   >>   This referral request   >>   Any documents/labs given to you for this referral    [facility] / Lab  Ph [phone] (Age 2 and up)                  Future tests that were ordered for you today     Open Future Orders        Priority Expected Expires Ordered    SLEEP EVALUATION & MANAGEMENT REFERRAL - ADULT Routine  6/26/2018 6/26/2017            Who to contact     If you have questions or need follow up information about today's clinic visit or your schedule please contact [facility] directly at [phone].  Normal or non-critical lab and imaging results will be communicated to you by  MyChart, letter or phone within 4 business days after the clinic has received the results. If you do not hear from us within 7 days, please contact the clinic through [employer] or phone. If you have a critical or abnormal lab result, we will notify you by phone as soon as possible.  Submit refill requests through [employer] or call your pharmacy and they will forward the refill request to us. Please allow 3 business days for your refill to be completed.          Additional Information About Your Visit        [employer] Information     [employer] gives you secure access to your electronic health record. If you see a primary care provider, you can also send messages to your care team and make appointments. If you have questions, please call your primary care clinic.  If you do not have a primary care provider, please call [phone] and they will assist you.        Care EveryWhere ID     This is your Care EveryWhere ID. This could be used by other organizations to access your [facility] medical records  [id number]        Your Vitals Were     Pulse BMI (Body Mass Index)                101 56.64 kg/m2           Blood Pressure from Last 3 Encounters:   06/26/17 147/88   11/23/16 163/90   10/26/16 140/82    Weight from Last 3 Encounters:   06/26/17 (!) 330 lb (149.7 kg)   03/09/17 (!) 324 lb (147 kg)   11/23/16 (!) 329 lb (149.2 kg)              We Performed the Following     Hemoglobin A1c          Where to get your medicines      These medications were sent to [place] [address] [place] [address] [address]     Phone:  [phone]     desogestrel-ethinyl estradiol 0.15-30 MG-MCG per tablet    metFORMIN 500 MG 24 hr tablet    PARoxetine 30 MG tablet          Primary Care Provider Office Phone # Fax #    Jennifer Crowe [phone]       [facility] [id number] [place] [address]        Equal Access to Services     CARIE DUNCAN AH: Reji  sepideh Ramos, noemi dariarory, qabradleyta kasara jaquez, gerry sunni lorenzyuejackson lukeKranthiivanna cherrie. So [facility] [phone].    ATENCIÓN: Si habla isabel, tiene a camacho disposición servicios gratuitos de asistencia lingüística. Arnulfo al [phone].    We comply with applicable federal civil rights laws and [place] laws. We do not discriminate on the basis of race, color, national origin, age, disability sex, sexual orientation or gender identity.            Thank you!     Thank you for choosing [facility]  for your care. Our goal is always to provide you with excellent care. Hearing back from our patients is one way we can continue to improve our services. Please take a few minutes to complete the written survey that you may receive in the mail after your visit with us. Thank you!             Your Updated Medication List - Protect others around you: Learn how to safely use, store and throw away your medicines at www.disposemymeds.org.          This list is accurate as of: 6/26/17  1:52 PM.  Always use your most recent med list.                   Brand Name Dispense Instructions for use Diagnosis    desogestrel-ethinyl estradiol 0.15-30 MG-MCG per tablet    APRI    112 tablet    Take 1 tablet by mouth daily Take as continuous daily dosing (omit placebo pills until after three months of hormone tablets.)    Polycystic ovaries, Encounter for surveillance of contraceptive pills        MG tablet   Generic drug:  ibuprofen      Take by mouth 3 times daily        metFORMIN 500 MG 24 hr tablet    GLUCOPHAGE-XR    180 tablet    Take 2 tablets (1,000 mg) by mouth daily (with dinner)    PCOS (polycystic ovarian syndrome)       order for DME     1 Device    Equipment being ordered: Wheelchair  ISRAEL:4 weeks Pt is Non-Weight bearing at all times.    Cellulitis and abscess of foot, except toes, Nondisplaced fracture of second metatarsal bone of left foot       PARoxetine 30 MG tablet     PAXIL    90 tablet    Take 1 tablet (30 mg) by mouth every morning    Anxiety       TECFIDERA 240 MG Cpdr   Generic drug:  dimethyl fumarate      Take 240 mg by mouth 2 times daily Prescribed and monitored by neurologist    MS (multiple sclerosis) (H)       VITAMIN D3 PO      Take 2,000 Units by mouth daily

## 2017-06-26 NOTE — LETTER
My Depression Action Plan  Name: Bess Enamorado   Date of Birth 1975  Date: 6/26/2017    My doctor: Jennifer Crowe   My clinic: [facility]  [address]  [facility] [address]  [phone]          GREEN    ZONE   Good Control    What it looks like:     Things are going generally well. You have normal up s and down s. You may even feel depressed from time to time, but bad moods usually last less than a day.   What you need to do:  1. Continue to care for yourself (see self care plan)  2. Check your depression survival kit and update it as needed  3. Follow your physician s recommendations including any medication.  4. Do not stop taking medication unless you consult with your physician first.           YELLOW         ZONE Getting Worse    What it looks like:     Depression is starting to interfere with your life.     It may be hard to get out of bed; you may be starting to isolate yourself from others.    Symptoms of depression are starting to last most all day and this has happened for several days.     You may have suicidal thoughts but they are not constant.   What you need to do:     1. Call your care team, your response to treatment will improve if you keep your care team informed of your progress. Yellow periods are signs an adjustment may need to be made.     2. Continue your self-care, even if you have to fake it!    3. Talk to someone in your support network    4. Open up your depression survival kit           RED    ZONE Medical Alert - Get Help    What it looks like:     Depression is seriously interfering with your life.     You may experience these or other symptoms: You can t get out of bed most days, can t work or engage in other necessary activities, you have trouble taking care of basic hygiene, or basic responsibilities, thoughts of suicide or death that will not go away, self-injurious behavior.     What you need to do:  1. Call your care team  and request a same-day appointment. If they are not available (weekends or after hours) call your local crisis line, emergency room or 911.      Electronically signed by: Stormy Perez, June 26, 2017    Depression Self Care Plan / Survival Kit    Self-Care for Depression  Here s the deal. Your body and mind are really not as separate as most people think.  What you do and think affects how you feel and how you feel influences what you do and think. This means if you do things that people who feel good do, it will help you feel better.  Sometimes this is all it takes.  There is also a place for medication and therapy depending on how severe your depression is, so be sure to consult with your medical provider and/ or Behavioral Health Consultant if your symptoms are worsening or not improving.     In order to better manage my stress, I will:    Exercise  Get some form of exercise, every day. This will help reduce pain and release endorphins, the  feel good  chemicals in your brain. This is almost as good as taking antidepressants!  This is not the same as joining a gym and then never going! (they count on that by the way ) It can be as simple as just going for a walk or doing some gardening, anything that will get you moving.      Hygiene   Maintain good hygiene (Get out of bed in the morning, Make your bed, Brush your teeth, Take a shower, and Get dressed like you were going to work, even if you are unemployed).  If your clothes don't fit try to get ones that do.    Diet  I will strive to eat foods that are good for me, drink plenty of water, and avoid excessive sugar, caffeine, alcohol, and other mood-altering substances.  Some foods that are helpful in depression are: complex carbohydrates, B vitamins, flaxseed, fish or fish oil, fresh fruits and vegetables.    Psychotherapy  I agree to participate in Individual Therapy (if recommended).    Medication  If prescribed medications, I agree to take them.  Missing  doses can result in serious side effects.  I understand that drinking alcohol, or other illicit drug use, may cause potential side effects.  I will not stop my medication abruptly without first discussing it with my provider.    Staying Connected With Others  I will stay in touch with my friends, family members, and my primary care provider/team.    Use your imagination  Be creative.  We all have a creative side; it doesn t matter if it s oil painting, sand castles, or mud pies! This will also kick up the endorphins.    Witness Beauty  (AKA stop and smell the roses) Take a look outside, even in mid-winter. Notice colors, textures. Watch the squirrels and birds.     Service to others  Be of service to others.  There is always someone else in need.  By helping others we can  get out of ourselves  and remember the really important things.  This also provides opportunities for practicing all the other parts of the program.    Humor  Laugh and be silly!  Adjust your TV habits for less news and crime-drama and more comedy.    Control your stress  Try breathing deep, massage therapy, biofeedback, and meditation. Find time to relax each day.     My support system    Clinic Contact:  Phone number:    Contact 1:  Phone number:    Contact 2:  Phone number:    Buddhism/:  Phone number:    Therapist:  Phone number:    Local crisis center:    Phone number:    Other community support:  Phone number:

## 2017-06-26 NOTE — NURSING NOTE
"Chief Complaint   Patient presents with     Physical     medication refills       Initial /88 (BP Location: Right arm, Patient Position: Chair, Cuff Size: Adult Regular)  Pulse 101  Wt (!) 330 lb (149.7 kg)  BMI 56.64 kg/m2 Estimated body mass index is 56.64 kg/(m^2) as calculated from the following:    Height as of 3/9/17: 5' 4\" (1.626 m).    Weight as of this encounter: 330 lb (149.7 kg).  Medication Reconciliation: complete   Stormy Perez CMA    "

## 2017-06-27 ASSESSMENT — PATIENT HEALTH QUESTIONNAIRE - PHQ9: SUM OF ALL RESPONSES TO PHQ QUESTIONS 1-9: 4

## 2017-06-27 ASSESSMENT — ANXIETY QUESTIONNAIRES: GAD7 TOTAL SCORE: 0

## 2017-07-26 ENCOUNTER — OFFICE VISIT (OUTPATIENT)
Dept: SLEEP MEDICINE | Facility: CLINIC | Age: 42
End: 2017-07-26
Attending: FAMILY MEDICINE
Payer: COMMERCIAL

## 2017-07-26 VITALS
BODY MASS INDEX: 50.02 KG/M2 | SYSTOLIC BLOOD PRESSURE: 128 MMHG | WEIGHT: 293 LBS | HEIGHT: 64 IN | DIASTOLIC BLOOD PRESSURE: 84 MMHG | HEART RATE: 85 BPM | OXYGEN SATURATION: 96 %

## 2017-07-26 DIAGNOSIS — R06.83 SNORING: ICD-10-CM

## 2017-07-26 DIAGNOSIS — R53.81 MALAISE AND FATIGUE: ICD-10-CM

## 2017-07-26 DIAGNOSIS — R06.00 DYSPNEA AND RESPIRATORY ABNORMALITY: Primary | ICD-10-CM

## 2017-07-26 DIAGNOSIS — R35.1 NOCTURIA MORE THAN TWICE PER NIGHT: ICD-10-CM

## 2017-07-26 DIAGNOSIS — G47.30 SLEEP APNEA, UNSPECIFIED TYPE: ICD-10-CM

## 2017-07-26 DIAGNOSIS — R53.83 MALAISE AND FATIGUE: ICD-10-CM

## 2017-07-26 DIAGNOSIS — Z72.820 LACK OF ADEQUATE SLEEP: ICD-10-CM

## 2017-07-26 DIAGNOSIS — Z82.0 FAMILY HISTORY OF SLEEP APNEA: ICD-10-CM

## 2017-07-26 DIAGNOSIS — E66.01 MORBID OBESITY DUE TO EXCESS CALORIES (H): ICD-10-CM

## 2017-07-26 DIAGNOSIS — R06.89 DYSPNEA AND RESPIRATORY ABNORMALITY: Primary | ICD-10-CM

## 2017-07-26 PROCEDURE — 99244 OFF/OP CNSLTJ NEW/EST MOD 40: CPT | Performed by: PHYSICIAN ASSISTANT

## 2017-07-26 RX ORDER — ZOLPIDEM TARTRATE 5 MG/1
TABLET ORAL
Qty: 1 TABLET | Refills: 0 | Status: SHIPPED | OUTPATIENT
Start: 2017-07-26 | End: 2017-08-22

## 2017-07-26 NOTE — PATIENT INSTRUCTIONS
Your BMI is Body mass index is 54.93 kg/(m^2).  Weight management is a personal decision.  If you are interested in exploring weight loss strategies, the following discussion covers the approaches that may be successful. Body mass index (BMI) is one way to tell whether you are at a healthy weight, overweight, or obese. It measures your weight in relation to your height.  A BMI of 18.5 to 24.9 is in the healthy range. A person with a BMI of 25 to 29.9 is considered overweight, and someone with a BMI of 30 or greater is considered obese. More than two-thirds of [place] adults are considered overweight or obese.  Being overweight or obese increases the risk for further weight gain. Excess weight may lead to heart disease and diabetes.  Creating and following plans for healthy eating and physical activity may help you improve your health.  Weight control is part of healthy lifestyle and includes exercise, emotional health, and healthy eating habits. Careful eating habits lifelong are the mainstay of weight control. Though there are significant health benefits from weight loss, long-term weight loss with diet alone may be very difficult to achieve- studies show long-term success with dietary management in less than 10% of people. Attaining a healthy weight may be especially difficult to achieve in those with severe obesity. In some cases, medications, devices and surgical management might be considered.  What can you do?  If you are overweight or obese and are interested in methods for weight loss, you should discuss this with your provider.     Consider reducing daily calorie intake by 500 calories.     Keep a food journal.     Avoiding skipping meals, consider cutting portions instead.    Diet combined with exercise helps maintain muscle while optimizing fat loss. Strength training is particularly important for building and maintaining muscle mass. Exercise helps reduce stress, increase energy, and improves fitness.  "Increasing exercise without diet control, however, may not burn enough calories to loose weight.       Start walking three days a week 10-20 minutes at a time    Work towards walking thirty minutes five days a week     Eventually, increase the speed of your walking for 1-2 minutes at time    In addition, we recommend that you review healthy lifestyles and methods for weight loss available through the National Institutes of Health patient information sites:  http://win.niddk.nih.gov/publications/index.htm    And look into health and wellness programs that may be available through your health insurance provider, employer, local community center, or [employer].    Weight management plan: Patient was referred to their PCP to discuss a diet and exercise plan.  Provider : Verito Husain PA-C  Contact Information: [facility] [phone]  [place]olen Points:  1. What is Obstructive Sleep Apnea (NARCISA)? NARCISA is the most common type of sleep apnea. Apnea literally means, \"without breath.\" It is characterized by repetitive pauses in breathing, despite continued effort to breathe, and is usually associated with a reduction in blood oxygen saturation. Apneas can last 10 to over 60 seconds. It is caused by narrowing or collapse of the upper airway as muscles relax during sleep. A number of things can make apnea worse, including: sleeping on your back, having alcohol in the evening, smoking, asthma, allergies, nasal congestion, and weight gain.  2. What are the consequences of NARCISA? Symptoms include: daytime sleepiness- possibly increasing the risk of falling asleep while driving, unrefreshing/restless sleep, snoring, insomnia, waking frequently to urinate, waking with heartburn or reflux, reduced concentration and memory, and morning headaches. Other health consequences may include development of high blood pressure. Untreated NARCISA also can contribute to heart disease, stroke and diabetes.   3. What are the treatment " options? In most situations, sleep apnea is a lifelong disease that must be managed with daily therapy. Continuous Positive Airway (CPAP) is the most reliable treatment. A mouthguard to hold your jaw forward is usually the next most reliable option. Other options include postioning devices (to keep you off your back), nasal valves, tongue retaining device, weight loss, surgery. There is more detail about these options toward the end of this document.  4. What are the most important things to remember about using CPAP?     WHERE CAN I FIND MORE INFORMATION?    American Academy of Sleep Medicine Patient information on sleep disorders:  http://yoursleep.aasmnet.org    CPAP-  WHY AND HOW?                                 Continuous positive airway pressure, or CPAP, is the most effective treatment for obstructive sleep apnea. It works by using air pressure to hold your throat open. A decision to use CPAP is a major step forward in the pursuit of a healthier life. The successful use of CPAP will help you breathe easier, sleep better and live healthier. Using CPAP can be a positive experience if you keep these farrell points in mind:  1. Commitment  CPAP is not a quick fix for your problem. It involves a long-term commitment to improve your sleep and your health.    2. Communication  Stay in close communication with both your sleep doctor and your CPAP supplier. Ask lots of questions and seek help when you need it.    3. Consistency  Use CPAP all night, every night and for every nap. You will receive the maximum health benefits from CPAP when you use it every time that you sleep. This will also make it easier for your body to adjust to the treatment.    4. Correction  The first machine and mask that you try may not be the best ones for you. Work with your sleep doctor and your CPAP supplier to make corrections to your equipment selection. Ask about trying a different type of machine or mask if you have ongoing problems. Make  "sure that your mask is a good fit and learn to use your equipment properly.    5. Challenge  Tell a family member or close friend to ask you each morning if you used your CPAP the previous night. Have someone to challenge you to give it your best effort.    6. Connection   Your adjustment to CPAP will be easier if you are able to connect with others who use the same treatment. Ask your sleep doctor if there is a support group in your area for people who have sleep apnea, or look for one on the Internet.  7. Comfort   Increase your level of comfort by using a saline spray, decongestant or heated humidifier if CPAP irritates your nose, mouth or throat. Use your unit's \"ramp\" setting to slowly get used to the air pressure level. There may be soft pads you can buy that will fit over your mask straps. Look on www.CPAP.com for accessories such as these straps, a pillow contoured for side-sleeping with CPAP, longer hoses, hose covers to reduce condensation, or stands to keep the hose out of your way.                   8. Cleaning   Clean your mask, tubing and headgear on a regular basis. Put this time in your schedule so that you don't forget to do it. Check and replace the filters for your CPAP unit and humidifier.    9. Completion   Although you are never finished with CPAP therapy, you should reward yourself by celebrating the completion of your first month of treatment. Expect this first month to be your hardest period of adjustment. It will involve some trial and error as you find the machine, mask and pressure settings that are right for you.    10. Continuation  After your first month of treatment, continue to make a daily commitment to use your CPAP all night, every night and for every nap.    CPAP-Tips to starting with success:  Begin using your CPAP for short periods of time during the day while you watch TV or read. This eliminates the pressure of trying to fall asleep with it when it is still a new " sensation.    Use CPAP every night and for every nap. Using it less often reduces the health benefits and makes it harder for your body to get used to it.    Newer CPAP models are virtually silent; however, if you find the sound of your CPAP machine to be bothersome, place the unit under your bed to dampen the sound.     Make small adjustments to your mask, tubing, straps and headgear until you get the right fit. Tightening the mask may actually worsen the leak.  If it leaves significant marks on your face or irritates the bridge of your nose, it may not be the best mask for you.  Speak with the person who supplied the mask and consider trying other masks. Insurances will allow you to try different masks during the first month of starting CPAP.  Insurance also covers a new mask, hose and filter about every 3-6 months.    Use a saline nasal spray to ease mild nasal congestion. Neti-Pot or saline nasal rinses may also help. Nasal gel sprays can help reduce nasal dryness.  Biotene mouthwash can be helpful to protect your teeth if you experience frequent dry mouth.  Dry mouth may be a sign of air escaping out of your mouth or out of the mask in the case of a full face mask.  Speak with your provider if you expect that is the case.     Take a nasal decongestant to relieve more severe nasal or sinus congestion.  Do not use Afrin (oxymetazoline) nasal spray more than 3 days in a row.  Speak with your sleep doctor if your nasal congestion is chronic.    Use a heated humidifier that fits your CPAP model to enhance your breathing comfort. Adjust the heat setting up if you get a dry nose or throat, down if you get condensation in the hose or mask.  Position the CPAP lower than you so that any condensation in the hose drains back into the machine rather than towards the mask.    Try a system that uses nasal pillows if traditional masks give you problems.    Clean your mask, tubing and headgear once a week. Make sure the  equipment dries fully.    Regularly check and replace the filters for your CPAP unit and humidifier.    Work closely with your sleep doctor and your CPAP supplier to make sure that you have the machine, mask and air pressure setting that works best for you.    BESIDES CPAP, WHAT OTHER THERAPIES ARE THERE?    Postioning devices if you only have the snoring or apnea while on your back    Dental devices if your condition is mild    Nasal valves may be effective though experience is limited    Weight loss if you are overweight    Surgery in limited cases where devices are not acceptable or there are problems with structures in the nose and throat  If treated with one of these alternative options, further evaluation is necessary to ensure that the therapy is effective. This may require some form of repeat testing.    Healthy Lifestyle:  Healthy diet, exercise and limit alcohol: Not only will excessive alcohol increase your weight over time, but it irritates the throat tissues and make them swell, shrinking the airway and causing snoring. Drinking alcohol should be limited and stopped within 3-4 hours before going to bed.   Stop smoking: (Red swollen throat, heat, nicotine), also irritates and swells the airway, among numerous other negative health consequences.  Positioning Device  This example shows a pillow that straps around the waist. It may be appropriate for those whose sleep study shows milder sleep apnea that occurs primarily when lying flat on one's back. Preliminary studies have shown benefit but effectiveness at home should be verified.                      Oral Appliance  These are examples of two of many custom-made devices that are more likely to work in mild sleep apnea                                                Oral appliances are dental mouth pieces that fit very much like a sports mouth guards or removable orthodontic retainers. They are used to treat snoring and obstructive sleep apnea . The device  prevents the airway from collapsing by either holding the tongue or supporting the jaw in a forward position. Since oral appliances are non-invasive and easy to use, they may be considered as an early treatment option. Oral appliance therapy (OAT) involves the customization, selection, fabrication, fitting, adjustments and long-term follow-up care of specially designed oral devices, worn during sleep, which reposition the lower jaw and tongue base forward to maintain an open airway.  Custom made oral appliances are proven to be more effective than over-the-counter devices. Therefore, the over-the-counter devices are recommended not to be used as a screening tool nor as a therapeutic option.     Who gets a dental device?  Oral appliance therapy can be used as an alternative to CPAP therapy for the treatment of mild to moderate sleep apnea and for those patients who prefer OAT to CPAP. Oral appliance therapy is a first line therapy for the treatment of primary snoring. Additionally, OAT is an option for those that cannot tolerate CPAP as therapy or who have experienced insufficient surgical results.                 Possible side effects?  Frequent but minor side effects include: excessive salivation, dry mouth, discomfort of teeth and jaw and temporary changes in the patient s bite.  Potential complications include: jaw pain, permanent occlusal changes and TMJ symptoms.  The above mentioned side effects and complications can be recognized and managed by dentists trained in dental sleep medicine.   Finding a dentist that practices dental sleep medicine  Specific training is available through the American Academy of Dental Sleep Medicine for dentists interested in working in the field of sleep. To find a dentist who is educated in the field of sleep and the use of oral appliances, near you, visit the Web site of the American Academy of Dental Sleep Medicine; also see  http://www.accpstorage.org/newOrganization/patients/oralAppliances.pdf   To search for a dentist certified in these practices:  Http://aadsm.org/FindADentist.aspx?1  Http://www.accpstorage.org/newOrganization/patients/oralAppliances.pdf    Weight Loss:    Some patients may experience reduction or elimination of sleep apnea with weight loss.  Though there are significant health benefits from weight loss, long-term weight loss is very difficult to achieve- studies show success with dietary management in less that 10% of people.     If you are interested in dietary weight loss, you should review the options discussed at the National Institutes of Health patient information site:     Http:/www.health.nih.gov/topic/WeightLossDieting    Bariatric programs offer counseling in all methods of weight loss:    Http:/www.[facility].org/Specialties/WeightLossSurgeryandMedicalMgmt/htm    Surgery:  There are a number of surgeries that have been attempted to treat apnea. In general, surgical options are usually reserved for cases in which there is a physical abnormality contributing to obstruction or other treatment options are ineffective or not tolerated. Most surgical options are either unreliable or quite invasive. One of the more common procedures is:  Uvulopalatopharyngoplasty: In this procedure, the uvula (the finger-like tissue that hangs in the back of the throat), part of the soft palate (the tissue that the uvula is attached to), and sometimes the tonsils or adenoids are removed. The efficacy of this surgery is around 30-50% .  After surgery, complications may include:  Sleepiness and sleep apnea related to post-surgery medication   Swelling, infection and bleeding   A sore throat and/or difficulty swallowing   Drainage of secretions into the nose and a nasal quality to the voice. English language speech does not seem to be affected by this surgery.   Narrowing of the airway in the nose and throat (hence  "constricting breathing) snoring and even iatrogenically caused sleep apnea. By cutting the tissues, excess scar tissue can \"tighten\" the airway and make it even smaller than it was before UPPP.  Patients who have had the uvula removed will become unable to correctly speak Brazilian or any other language that has a uvular 'r' phoneme.    Surgeries to help resolve nasal congestion may help reduce the severity of apnea slightly. Nasal congestion does not cause apnea on its own, so these surgeries are usually not performed just for NARCISA.  They may be worth considering if the nasal congestion is significantly bothersome independent of apnea.      "

## 2017-07-26 NOTE — MR AVS SNAPSHOT
After Visit Summary   7/26/2017    Bess Enamorado    MRN: [id number]           Patient Information     Date Of Birth          1975        Visit Information        Provider Department      7/26/2017 9:30 AM Verito Husain PA [facility]        Today's Diagnoses     Dyspnea and respiratory abnormality    -  1    Snoring        Morbid obesity due to excess calories (H)        Family history of sleep apnea        Nocturia more than twice per night        Lack of adequate sleep        Sleep apnea, unspecified type        Malaise and fatigue          Care Instructions      Your BMI is Body mass index is 54.93 kg/(m^2).  Weight management is a personal decision.  If you are interested in exploring weight loss strategies, the following discussion covers the approaches that may be successful. Body mass index (BMI) is one way to tell whether you are at a healthy weight, overweight, or obese. It measures your weight in relation to your height.  A BMI of 18.5 to 24.9 is in the healthy range. A person with a BMI of 25 to 29.9 is considered overweight, and someone with a BMI of 30 or greater is considered obese. More than two-thirds of American adults are considered overweight or obese.  Being overweight or obese increases the risk for further weight gain. Excess weight may lead to heart disease and diabetes.  Creating and following plans for healthy eating and physical activity may help you improve your health.  Weight control is part of healthy lifestyle and includes exercise, emotional health, and healthy eating habits. Careful eating habits lifelong are the mainstay of weight control. Though there are significant health benefits from weight loss, long-term weight loss with diet alone may be very difficult to achieve- studies show long-term success with dietary management in less than 10% of people. Attaining a healthy weight may be especially difficult to achieve in those with severe  "obesity. In some cases, medications, devices and surgical management might be considered.  What can you do?  If you are overweight or obese and are interested in methods for weight loss, you should discuss this with your provider.     Consider reducing daily calorie intake by 500 calories.     Keep a food journal.     Avoiding skipping meals, consider cutting portions instead.    Diet combined with exercise helps maintain muscle while optimizing fat loss. Strength training is particularly important for building and maintaining muscle mass. Exercise helps reduce stress, increase energy, and improves fitness. Increasing exercise without diet control, however, may not burn enough calories to loose weight.       Start walking three days a week 10-20 minutes at a time    Work towards walking thirty minutes five days a week     Eventually, increase the speed of your walking for 1-2 minutes at time    In addition, we recommend that you review healthy lifestyles and methods for weight loss available through the National Institutes of Health patient information sites:  http://win.niddk.nih.gov/publications/index.htm    And look into health and wellness programs that may be available through your health insurance provider, employer, local community center, or glenny club.    Weight management plan: Patient was referred to their PCP to discuss a diet and exercise plan.  Provider : Verito Husain PA-C  Contact Information: [facility] [phone]  Nolen Points:  1. What is Obstructive Sleep Apnea (NARCISA)? NARCISA is the most common type of sleep apnea. Apnea literally means, \"without breath.\" It is characterized by repetitive pauses in breathing, despite continued effort to breathe, and is usually associated with a reduction in blood oxygen saturation. Apneas can last 10 to over 60 seconds. It is caused by narrowing or collapse of the upper airway as muscles relax during sleep. A number of things can make apnea worse, " including: sleeping on your back, having alcohol in the evening, smoking, asthma, allergies, nasal congestion, and weight gain.  2. What are the consequences of NARCISA? Symptoms include: daytime sleepiness- possibly increasing the risk of falling asleep while driving, unrefreshing/restless sleep, snoring, insomnia, waking frequently to urinate, waking with heartburn or reflux, reduced concentration and memory, and morning headaches. Other health consequences may include development of high blood pressure. Untreated NARCISA also can contribute to heart disease, stroke and diabetes.   3. What are the treatment options? In most situations, sleep apnea is a lifelong disease that must be managed with daily therapy. Continuous Positive Airway (CPAP) is the most reliable treatment. A mouthguard to hold your jaw forward is usually the next most reliable option. Other options include postioning devices (to keep you off your back), nasal valves, tongue retaining device, weight loss, surgery. There is more detail about these options toward the end of this document.  4. What are the most important things to remember about using CPAP?     WHERE CAN I FIND MORE INFORMATION?    American Academy of Sleep Medicine Patient information on sleep disorders:  http://yoursleep.aasmnet.org    CPAP-  WHY AND HOW?                                 Continuous positive airway pressure, or CPAP, is the most effective treatment for obstructive sleep apnea. It works by using air pressure to hold your throat open. A decision to use CPAP is a major step forward in the pursuit of a healthier life. The successful use of CPAP will help you breathe easier, sleep better and live healthier. Using CPAP can be a positive experience if you keep these farrell points in mind:  1. Commitment  CPAP is not a quick fix for your problem. It involves a long-term commitment to improve your sleep and your health.    2. Communication  Stay in close communication with both your  "sleep doctor and your CPAP supplier. Ask lots of questions and seek help when you need it.    3. Consistency  Use CPAP all night, every night and for every nap. You will receive the maximum health benefits from CPAP when you use it every time that you sleep. This will also make it easier for your body to adjust to the treatment.    4. Correction  The first machine and mask that you try may not be the best ones for you. Work with your sleep doctor and your CPAP supplier to make corrections to your equipment selection. Ask about trying a different type of machine or mask if you have ongoing problems. Make sure that your mask is a good fit and learn to use your equipment properly.    5. Challenge  Tell a family member or close friend to ask you each morning if you used your CPAP the previous night. Have someone to challenge you to give it your best effort.    6. Connection   Your adjustment to CPAP will be easier if you are able to connect with others who use the same treatment. Ask your sleep doctor if there is a support group in your area for people who have sleep apnea, or look for one on the Internet.  7. Comfort   Increase your level of comfort by using a saline spray, decongestant or heated humidifier if CPAP irritates your nose, mouth or throat. Use your unit's \"ramp\" setting to slowly get used to the air pressure level. There may be soft pads you can buy that will fit over your mask straps. Look on www.CPAP.com for accessories such as these straps, a pillow contoured for side-sleeping with CPAP, longer hoses, hose covers to reduce condensation, or stands to keep the hose out of your way.                   8. Cleaning   Clean your mask, tubing and headgear on a regular basis. Put this time in your schedule so that you don't forget to do it. Check and replace the filters for your CPAP unit and humidifier.    9. Completion   Although you are never finished with CPAP therapy, you should reward yourself by " celebrating the completion of your first month of treatment. Expect this first month to be your hardest period of adjustment. It will involve some trial and error as you find the machine, mask and pressure settings that are right for you.    10. Continuation  After your first month of treatment, continue to make a daily commitment to use your CPAP all night, every night and for every nap.    CPAP-Tips to starting with success:  Begin using your CPAP for short periods of time during the day while you watch TV or read. This eliminates the pressure of trying to fall asleep with it when it is still a new sensation.    Use CPAP every night and for every nap. Using it less often reduces the health benefits and makes it harder for your body to get used to it.    Newer CPAP models are virtually silent; however, if you find the sound of your CPAP machine to be bothersome, place the unit under your bed to dampen the sound.     Make small adjustments to your mask, tubing, straps and headgear until you get the right fit. Tightening the mask may actually worsen the leak.  If it leaves significant marks on your face or irritates the bridge of your nose, it may not be the best mask for you.  Speak with the person who supplied the mask and consider trying other masks. Insurances will allow you to try different masks during the first month of starting CPAP.  Insurance also covers a new mask, hose and filter about every 3-6 months.    Use a saline nasal spray to ease mild nasal congestion. Neti-Pot or saline nasal rinses may also help. Nasal gel sprays can help reduce nasal dryness.  Biotene mouthwash can be helpful to protect your teeth if you experience frequent dry mouth.  Dry mouth may be a sign of air escaping out of your mouth or out of the mask in the case of a full face mask.  Speak with your provider if you expect that is the case.     Take a nasal decongestant to relieve more severe nasal or sinus congestion.  Do not use  Afrin (oxymetazoline) nasal spray more than 3 days in a row.  Speak with your sleep doctor if your nasal congestion is chronic.    Use a heated humidifier that fits your CPAP model to enhance your breathing comfort. Adjust the heat setting up if you get a dry nose or throat, down if you get condensation in the hose or mask.  Position the CPAP lower than you so that any condensation in the hose drains back into the machine rather than towards the mask.    Try a system that uses nasal pillows if traditional masks give you problems.    Clean your mask, tubing and headgear once a week. Make sure the equipment dries fully.    Regularly check and replace the filters for your CPAP unit and humidifier.    Work closely with your sleep doctor and your CPAP supplier to make sure that you have the machine, mask and air pressure setting that works best for you.    BESIDES CPAP, WHAT OTHER THERAPIES ARE THERE?    Postioning devices if you only have the snoring or apnea while on your back    Dental devices if your condition is mild    Nasal valves may be effective though experience is limited    Weight loss if you are overweight    Surgery in limited cases where devices are not acceptable or there are problems with structures in the nose and throat  If treated with one of these alternative options, further evaluation is necessary to ensure that the therapy is effective. This may require some form of repeat testing.    Healthy Lifestyle:  Healthy diet, exercise and limit alcohol: Not only will excessive alcohol increase your weight over time, but it irritates the throat tissues and make them swell, shrinking the airway and causing snoring. Drinking alcohol should be limited and stopped within 3-4 hours before going to bed.   Stop smoking: (Red swollen throat, heat, nicotine), also irritates and swells the airway, among numerous other negative health consequences.  Positioning Device  This example shows a pillow that straps around  the waist. It may be appropriate for those whose sleep study shows milder sleep apnea that occurs primarily when lying flat on one's back. Preliminary studies have shown benefit but effectiveness at home should be verified.                      Oral Appliance  These are examples of two of many custom-made devices that are more likely to work in mild sleep apnea                                                Oral appliances are dental mouth pieces that fit very much like a sports mouth guards or removable orthodontic retainers. They are used to treat snoring and obstructive sleep apnea . The device prevents the airway from collapsing by either holding the tongue or supporting the jaw in a forward position. Since oral appliances are non-invasive and easy to use, they may be considered as an early treatment option. Oral appliance therapy (OAT) involves the customization, selection, fabrication, fitting, adjustments and long-term follow-up care of specially designed oral devices, worn during sleep, which reposition the lower jaw and tongue base forward to maintain an open airway.  Custom made oral appliances are proven to be more effective than over-the-counter devices. Therefore, the over-the-counter devices are recommended not to be used as a screening tool nor as a therapeutic option.     Who gets a dental device?  Oral appliance therapy can be used as an alternative to CPAP therapy for the treatment of mild to moderate sleep apnea and for those patients who prefer OAT to CPAP. Oral appliance therapy is a first line therapy for the treatment of primary snoring. Additionally, OAT is an option for those that cannot tolerate CPAP as therapy or who have experienced insufficient surgical results.                 Possible side effects?  Frequent but minor side effects include: excessive salivation, dry mouth, discomfort of teeth and jaw and temporary changes in the patient s bite.  Potential complications include: jaw  pain, permanent occlusal changes and TMJ symptoms.  The above mentioned side effects and complications can be recognized and managed by dentists trained in dental sleep medicine.   Finding a dentist that practices dental sleep medicine  Specific training is available through the American Academy of Dental Sleep Medicine for dentists interested in working in the field of sleep. To find a dentist who is educated in the field of sleep and the use of oral appliances, near you, visit the Web site of the American Academy of Dental Sleep Medicine; also see http://www.accpstorage.org/newOrganization/patients/oralAppliances.pdf   To search for a dentist certified in these practices:  Http://aadsm.org/FindADentist.aspx?1  Http://www.accpstorage.org/newOrganization/patients/oralAppliances.pdf    Weight Loss:    Some patients may experience reduction or elimination of sleep apnea with weight loss.  Though there are significant health benefits from weight loss, long-term weight loss is very difficult to achieve- studies show success with dietary management in less that 10% of people.     If you are interested in dietary weight loss, you should review the options discussed at the National Institutes of Health patient information site:     Http:/www.health.nih.gov/topic/WeightLossDieting    Bariatric programs offer counseling in all methods of weight loss:    Http:/www.uofedicalcenter.org/Specialties/WeightLossSurgeryandMedicalMgmt/htm    Surgery:  There are a number of surgeries that have been attempted to treat apnea. In general, surgical options are usually reserved for cases in which there is a physical abnormality contributing to obstruction or other treatment options are ineffective or not tolerated. Most surgical options are either unreliable or quite invasive. One of the more common procedures is:  Uvulopalatopharyngoplasty: In this procedure, the uvula (the finger-like tissue that hangs in the back of the throat), part  "of the soft palate (the tissue that the uvula is attached to), and sometimes the tonsils or adenoids are removed. The efficacy of this surgery is around 30-50% .  After surgery, complications may include:  Sleepiness and sleep apnea related to post-surgery medication   Swelling, infection and bleeding   A sore throat and/or difficulty swallowing   Drainage of secretions into the nose and a nasal quality to the voice. English language speech does not seem to be affected by this surgery.   Narrowing of the airway in the nose and throat (hence constricting breathing) snoring and even iatrogenically caused sleep apnea. By cutting the tissues, excess scar tissue can \"tighten\" the airway and make it even smaller than it was before UPPP.  Patients who have had the uvula removed will become unable to correctly speak Turkish or any other language that has a uvular 'r' phoneme.    Surgeries to help resolve nasal congestion may help reduce the severity of apnea slightly. Nasal congestion does not cause apnea on its own, so these surgeries are usually not performed just for NARCISA.  They may be worth considering if the nasal congestion is significantly bothersome independent of apnea.              Follow-ups after your visit        Your next 10 appointments already scheduled     Aug 09, 2017  8:00 PM CDT   Ps[facility] Split W/Tcm with SLEEP LAB, BED [facility]   [facility]    [address]  [facility] [address]   [phone]            Aug 15, 2017  1:00 PM CDT   Return Sleep Patient with SHANNAN Joseph   [facility]    [address]  [facility] [address]   [phone]              Future tests that were ordered for you today     Open Future Orders        Priority Expected Expires Ordered    Blood gas venous and oxyhgb Routine  7/27/2018 7/26/2017    Comprehensive Sleep Study Routine  1/22/2018 7/26/2017            Who to contact  " "   If you have questions or need follow up information about today's clinic visit or your schedule please contact [facility] directly at [phone].  Normal or non-critical lab and imaging results will be communicated to you by MyChart, letter or phone within 4 business days after the clinic has received the results. If you do not hear from us within 7 days, please contact the clinic through [employer] e[employer] or phone. If you have a critical or abnormal lab result, we will notify you by phone as soon as possible.  Submit refill requests through [employer] or call your pharmacy and they will forward the refill request to us. Please allow 3 business days for your refill to be completed.          Additional Information About Your Visit        [employer] Information     [employer] gives you secure access to your electronic health record. If you see a primary care provider, you can also send messages to your care team and make appointments. If you have questions, please call your primary care clinic.  If you do not have a primary care provider, please call [phone] and they will assist you.        Care EveryWhere ID     This is your Care EveryWhere ID. This could be used by other organizations to access your [place] medical records  [id number]        Your Vitals Were     Pulse Height Pulse Oximetry BMI (Body Mass Index)          85 1.626 m (5' 4\") 96% 54.93 kg/m2         Blood Pressure from Last 3 Encounters:   07/26/17 128/84   06/26/17 147/88   11/23/16 163/90    Weight from Last 3 Encounters:   07/26/17 (!) 145.2 kg (320 lb)   06/26/17 (!) 149.7 kg (330 lb)   03/09/17 (!) 147 kg (324 lb)              We Performed the Following     SLEEP EVALUATION & MANAGEMENT REFERRAL - ADULT          Today's Medication Changes          These changes are accurate as of: 7/26/17 10:15 AM.  If you have any questions, ask your nurse or doctor.               Start taking these medicines.        Dose/Directions    zolpidem 5 MG " tablet   Commonly known as:  AMBIEN   Started by:  Verito Husain PA        Take tablet by mouth 15 minutes prior to sleep, for Sleep Study   Quantity:  1 tablet   Refills:  0            Where to get your medicines      Some of these will need a paper prescription and others can be bought over the counter.  Ask your nurse if you have questions.     Bring a paper prescription for each of these medications     zolpidem 5 MG tablet                Primary Care Provider Office Phone # Fax #    Jennifer Danielle Crowe [phone]       [facility] [address] [id number]        Equal Access to Services     [facility]: Hadii sepideh norman hadasho Socarlos, waaxda luqadaha, qaybta kaalmada adeerikayairina, gerry sarkar . So [facility] [phone].    ATENCIÓN: Si habla español, tiene a camacho disposición servicios gratuitos de asistencia lingüística. [facility] [phone].    We comply with applicable federal civil rights laws and [place] laws. We do not discriminate on the basis of race, color, national origin, age, disability sex, sexual orientation or gender identity.            Thank you!     Thank you for choosing [facility]  for your care. Our goal is always to provide you with excellent care. Hearing back from our patients is one way we can continue to improve our services. Please take a few minutes to complete the written survey that you may receive in the mail after your visit with us. Thank you!             Your Updated Medication List - Protect others around you: Learn how to safely use, store and throw away your medicines at www.disposemymeds.org.          This list is accurate as of: 7/26/17 10:15 AM.  Always use your most recent med list.                   Brand Name Dispense Instructions for use Diagnosis    desogestrel-ethinyl estradiol 0.15-30 MG-MCG per tablet    APRI    112 tablet    Take 1 tablet by mouth daily Take as continuous  daily dosing (omit placebo pills until after three months of hormone tablets.)    Polycystic ovaries, Encounter for surveillance of contraceptive pills        MG tablet   Generic drug:  ibuprofen      Take by mouth 3 times daily        metFORMIN 500 MG 24 hr tablet    GLUCOPHAGE-XR    180 tablet    Take 2 tablets (1,000 mg) by mouth daily (with dinner)    PCOS (polycystic ovarian syndrome)       order for DME     1 Device    Equipment being ordered: Wheelchair  ISRAEL:4 weeks Pt is Non-Weight bearing at all times.    Cellulitis and abscess of foot, except toes, Nondisplaced fracture of second metatarsal bone of left foot       PARoxetine 30 MG tablet    PAXIL    90 tablet    Take 1 tablet (30 mg) by mouth every morning    Anxiety       TECFIDERA 240 MG Cpdr   Generic drug:  dimethyl fumarate      Take 240 mg by mouth 2 times daily Prescribed and monitored by neurologist    MS (multiple sclerosis) (H)       VITAMIN D3 PO      Take 2,000 Units by mouth daily        zolpidem 5 MG tablet    AMBIEN    1 tablet    Take tablet by mouth 15 minutes prior to sleep, for Sleep Study

## 2017-07-26 NOTE — NURSING NOTE
"Chief Complaint   Patient presents with     Sleep Problem     Consult snoring, breathing trouble during sleep       Initial /84  Pulse 85  Ht 1.626 m (5' 4\")  Wt (!) 145.2 kg (320 lb)  SpO2 96%  BMI 54.93 kg/m2 Estimated body mass index is 54.93 kg/(m^2) as calculated from the following:    Height as of this encounter: 1.626 m (5' 4\").    Weight as of this encounter: 145.2 kg (320 lb).  Medication Reconciliation: complete    "

## 2017-07-26 NOTE — PROGRESS NOTES
"  Sleep Consultation:    Date on this visit: 7/26/2017    Bess Enamorado is sent by Jennifer Crowe for a sleep consultation.    Primary Physician: Jennifer Crowe     Chief Complaint   Patient presents with     Sleep Problem     Consult snoring, breathing trouble during sleep     HPI: Bess Enamorado is 41 year old female with medical history remarkable for multiple sclerosis, hypertension, RLS, anxiety, binge eating disorder, PCOS and morbid obesity. She presents in clinic today for evaluation of possible sleep disordered breathing.     Bess goes to sleep at 9:00 PM during the week. She wakes up at 6:45 AM with an alarm. She falls asleep in 30 minutes.  Bess denies difficulty falling asleep.  She wakes up ~ 5 times a night for 5 minutes before falling back to sleep.  Bess wakes up due to \"urge to void\". On weekends, Bess goes to sleep at 11:00 PM.  She wakes up at 10:00 AM without an alarm. She falls asleep in 30 minutes.  Patient gets an average of 7-8 hours of sleep per night. She does not feel refreshed by sleep.     Patient does use electronics in bed and read in bed and does not watch TV in bed.     Bess does not do shift work.    Bess does snore every night and snoring is loud. Patient does not have a regular bed partner. There is report of snoring.  She does have witnessed apneas.   Patient sleeps on her side and stomach. She has occasional morning headaches(1 per week), frequent morning dry mouth and no morning confusion.  She has RLS and treats it with distraction, she takes Pramipexole only occasionally. Bess denies any sleep walking, sleep talking, dream enactment, sleep paralysis, cataplexy and hypnogogic/hypnopompic hallucinations. She has bruxism.     Bess denies difficulty breathing through her nose, claustrophobia and reflux at night.      Bess has lost ~20# in the last year.  Patient describes herself as a morning person.  She would prefer to go to sleep at 10:00 PM and wake up at 7:00 AM.  " Patient's [place] Sleepiness score 5/24 inconsistent with excessive  daytime sleepiness.  She has fatigue.     Bess naps 5 times per week for 120 minutes, feels unrefreshed after naps. She takes some inadvertant naps.  She denies falling asleep while driving.  Patient was counseled on the importance of driving while alert, to pull over if drowsy, or nap before getting into the vehicle if sleepy.  She has 1-2 sodas per week.     Allergies:    Allergies   Allergen Reactions     Nkda [No Known Drug Allergies]        Medications:    Current Outpatient Prescriptions   Medication Sig Dispense Refill     desogestrel-ethinyl estradiol (APRI) 0.15-30 MG-MCG per tablet Take 1 tablet by mouth daily Take as continuous daily dosing (omit placebo pills until after three months of hormone tablets.) 112 tablet 3     PARoxetine (PAXIL) 30 MG tablet Take 1 tablet (30 mg) by mouth every morning 90 tablet 1     metFORMIN (GLUCOPHAGE-XR) 500 MG 24 hr tablet Take 2 tablets (1,000 mg) by mouth daily (with dinner) 180 tablet 1     TECFIDERA 240 MG CPDR Take 240 mg by mouth 2 times daily Prescribed and monitored by neurologist       Cholecalciferol (VITAMIN D3 PO) Take 2,000 Units by mouth daily       ibuprofen (IBU) 600 MG tablet Take by mouth 3 times daily       order for DME Equipment being ordered: Wheelchair   ISRAEL:4 weeks  Pt is Non-Weight bearing at all times. 1 Device 0       Problem List:  Patient Active Problem List    Diagnosis Date Noted     Benign essential hypertension 02/15/2016     Priority: Medium     Peroneal tendon rupture 11/23/2015     Priority: Medium     Morbid obesity (H) 08/24/2015     Priority: Medium     Oct 2016:  Starting [facility] program in [place], goal to lose 140 lbs over next 2 years       Papanicolaou smear of cervix with low grade squamous intraepithelial lesion (LGSIL) 08/03/2014     Priority: Medium     7/29/2014:Pap--LSIL. Neg high risk HPV. Plan cotest in 1 year (if this is ASCUS or LSIL then  colp). In reminders  8/20/15: Pap - ASCUS, Neg HPV. Plan colp  9/14/15: [place] Bx & ECC - negative. Plan cotest in 1 year.   10/20/16: NIL Pap, Neg HPV. Plan cotest in 3 years.        Eating disorder 08/25/2013     Priority: Medium     Binge-eating disorder; social phobia  See psychological assessment from [facility], Dr. Anupama Bowie, 8/14/2013  Problem list name updated by automated process. Provider to review       Leg edema 04/19/2013     Priority: Medium     Dyspnea and respiratory abnormality 04/19/2013     Priority: Medium     Problem list name updated by automated process. Provider to review       Anxiety 06/28/2011     Priority: Medium     Followed by neurologist       Restless legs 06/28/2011     Priority: Medium     CARDIOVASCULAR SCREENING; LDL GOAL LESS THAN 160 10/31/2010     Priority: Medium     Constipation 09/18/2006     Priority: Medium     9/18/2006    Has tried otc suppository and otc lax.   Problem list name updated by automated process. Provider to review       Multiple sclerosis (H) 08/29/2005     Priority: Medium     9/18/200pt dx with MS 2004--dx by neurolgist and is followed by Dr. Jeet Nicholson, also MS nurse Heather Thomas       Polycystic ovaries 08/29/2005     Priority: Medium     Diagnosed in [place], had facial hair, overweight, carb craving, records being requested, never on metformin          Past Medical/Surgical History:  Past Medical History:   Diagnosis Date     ASCUS favor benign 8/2015    Neg HPV     H/O colposcopy with cervical biopsy 9/14/15    Bx & ECC - negative     LSIL on Pap smear 7/2014    Neg high risk HPV     Multiple sclerosis (H) 8/29/2005 9/18/200pt dx with MS 2004--dx by neurolgist and is followed by Dr. Steven Ayala 10/2016     UNSPEC CONSTIPATION 9/18/2006 9/18/2006   Has tried otc suppository and otc lax.      Past Surgical History:   Procedure Laterality Date     CHOLECYSTECTOMY, LAPOROSCOPIC      Cholecystectomy, Laparoscopic      OPEN REDUCTION INTERNAL FIXATION TOE(S)  4/13/2012    Procedure:OPEN REDUCTION INTERNAL FIXATION TOE(S); Open reduction internal fixation right proximal fifth metatarsal fracture-   Anes-choice block; Surgeon:LEY, JEFFREY DUANE; Location:[place] OR       Social History:  Social History     Social History     Marital status: Single     Spouse name: N/A     Number of children: N/A     Years of education: N/A     Occupational History      [employer]     Social History Main Topics     Smoking status: Never Smoker     Smokeless tobacco: Never Used     Alcohol use Yes      Comment: social     Drug use: No     Sexual activity: Not Currently     Partners: Male     Birth control/ protection: Pill     Other Topics Concern     Parent/Sibling W/ Cabg, Mi Or Angioplasty Before 65f 55m? No     Social History Narrative    , 3rd-5th grade       Family History:  Family History   Problem Relation Age of Onset     Neurologic Disorder Father      MS     HEART DISEASE Father      irregular heart rate     DIABETES Father      Melanoma Father      CANCER Maternal Grandfather      lung     CANCER Paternal Grandmother      stomach     CANCER Mother      Gynecology Maternal Aunt      PCOS       Review of Systems:  A complete review of systems reviewed by me is negative with the exeption of what has been mentioned in the history of present illness.  CONSTITUTIONAL: NEGATIVE for weight gain/loss, fever, chills, sweats or night sweats, drug allergies.  EYES: NEGATIVE for changes in vision, blind spots, double vision.  ENT:  POSITIVE for  runny nose  CARDIAC: NEGATIVE for fast heartbeats or fluttering in chest, chest pain or pressure, breathlessness when lying flat, swollen legs or swollen feet.  NEUROLOGIC:  POSITIVE for  headaches  DERMATOLOGIC: NEGATIVE for rashes, new moles or change in mole(s)  PULMONARY:  POSITIVE for  SOB with activity  GASTROINTESTINAL: NEGATIVE for nausea or vomitting, loose or watery stools,  "fat or grease in stools, constipation, abdominal pain, bowel movements black in color or blood noted.  GENITOURINARY: NEGATIVE for pain during urination, blood in urine, urinating more frequently than usual, irregular menstrual periods.  MUSCULOSKELETAL:  POSITIVE for  muscle pain  ENDOCRINE: NEGATIVE for increased thirst or urination, diabetes.  LYMPHATIC: NEGATIVE for swollen lymph nodes, lumps or bumps in the breasts or nipple discharge.    Physical Examination:  Vitals: /84  Pulse 85  Ht 1.626 m (5' 4\")  Wt (!) 145.2 kg (320 lb)  SpO2 96%  BMI 54.93 kg/m2  BMI= Body mass index is 54.93 kg/(m^2).    Neck Cir (cm): 43 cm    No flowsheet data found.    GENERAL APPEARANCE: alert and no distress  EYES: Eyes grossly normal to inspection, PERRL and conjunctivae and sclerae normal  HENT: ear canals and TM's normal, nose and mouth without ulcers or lesions, oropharynx crowded and tongue base enlarged  NECK: no adenopathy and no asymmetry, masses, or scars  RESP: lungs clear to auscultation - no rales, rhonchi or wheezes  CV: regular rates and rhythm and normal S1 S2, no S3 or S4  MS: uses a walker  NEURO: Normal strength and tone, mentation intact and speech normal  PSYCH: mentation appears normal and affect normal/bright  Mallampati Class: IV.  Tonsillar Stage: 1  hidden by pillars.  Last Basic Metabolic Panel:  Lab Results   Component Value Date     08/20/2015      Lab Results   Component Value Date    POTASSIUM 4.0 08/20/2015     Lab Results   Component Value Date    CHLORIDE 105 08/20/2015     Lab Results   Component Value Date    EVITA 9.3 03/21/2017     Lab Results   Component Value Date    CO2 30 08/20/2015     Lab Results   Component Value Date    BUN 14 08/20/2015     Lab Results   Component Value Date    CR 0.66 03/21/2017     Lab Results   Component Value Date     02/11/2016       ASSESSMENT:   Bess Enamorado is 41 year old female with medical history remarkable for multiple sclerosis, " "hypertension, RLS, anxiety, binge eating disorder, PCOS and morbid obesity. She presents in clinic today for evaluation of possible sleep disordered breathing.    Patient has high probability of obstructive sleep apnea with modest possibility of coexisting hypoventilation based on BMI of 54, loud snoring, witnessed apnea, nocturia, non-refreshing sleep, fatigue/daytime sleepiness crowded oropharynx, morning headaches, neck circumference of 17\" and co-morbid HTN. STOP-BANG score is 6/8.   PLAN:    Recommend polysomnogram (using 4% desaturation/Medicare/2012 AASM 1B scoring rules) with transcutaneous C02 monitoring and pre-study VBG to evaluate for obstructive sleep apnea and hypoventilation and hypoxemia.  Ambien if needed. Given the patient's risk of hypoventilation and severity of obesity, laboratory study would be preferable. Patient was counseled in alternative therapies for management of sleep apnea, including weight loss strategies and surgical management.    Literature provided regarding sleep apnea.      She will follow up with me in approximately one week after her sleep study has been competed to review the results and discuss plan of care.       Polysomnography reviewed.  Obstructive sleep apnea reviewed.  Complications of untreated sleep apnea were reviewed.    Verito Husain PA-C    CC: Jennifer Crowe        "

## 2017-08-08 DIAGNOSIS — R06.00 DYSPNEA AND RESPIRATORY ABNORMALITY: ICD-10-CM

## 2017-08-08 DIAGNOSIS — R06.89 DYSPNEA AND RESPIRATORY ABNORMALITY: ICD-10-CM

## 2017-08-08 DIAGNOSIS — G47.30 SLEEP APNEA, UNSPECIFIED TYPE: ICD-10-CM

## 2017-08-08 LAB
BASE EXCESS BLDV CALC-SCNC: 0.4 MMOL/L
HCO3 BLDV-SCNC: 26 MMOL/L (ref 21–28)
O2/TOTAL GAS SETTING VFR VENT: NORMAL %
OXYHGB MFR BLDV: 72 %
PCO2 BLDV: 43 MM HG (ref 40–50)
PH BLDV: 7.39 PH (ref 7.32–7.43)
PO2 BLDV: 39 MM HG (ref 25–47)

## 2017-08-08 PROCEDURE — 36415 COLL VENOUS BLD VENIPUNCTURE: CPT | Performed by: PHYSICIAN ASSISTANT

## 2017-08-08 PROCEDURE — 82805 BLOOD GASES W/O2 SATURATION: CPT | Performed by: PHYSICIAN ASSISTANT

## 2017-08-09 ENCOUNTER — THERAPY VISIT (OUTPATIENT)
Dept: SLEEP MEDICINE | Facility: CLINIC | Age: 42
End: 2017-08-09
Payer: COMMERCIAL

## 2017-08-09 DIAGNOSIS — R35.1 NOCTURIA MORE THAN TWICE PER NIGHT: ICD-10-CM

## 2017-08-09 DIAGNOSIS — R53.81 MALAISE AND FATIGUE: ICD-10-CM

## 2017-08-09 DIAGNOSIS — R06.00 DYSPNEA AND RESPIRATORY ABNORMALITY: ICD-10-CM

## 2017-08-09 DIAGNOSIS — G47.30 SLEEP APNEA, UNSPECIFIED TYPE: ICD-10-CM

## 2017-08-09 DIAGNOSIS — R53.83 MALAISE AND FATIGUE: ICD-10-CM

## 2017-08-09 DIAGNOSIS — Z72.820 LACK OF ADEQUATE SLEEP: ICD-10-CM

## 2017-08-09 DIAGNOSIS — R06.89 DYSPNEA AND RESPIRATORY ABNORMALITY: ICD-10-CM

## 2017-08-09 DIAGNOSIS — Z82.0 FAMILY HISTORY OF SLEEP APNEA: ICD-10-CM

## 2017-08-09 DIAGNOSIS — E66.01 MORBID OBESITY DUE TO EXCESS CALORIES (H): ICD-10-CM

## 2017-08-09 PROCEDURE — 95811 POLYSOM 6/>YRS CPAP 4/> PARM: CPT | Performed by: PHYSICIAN ASSISTANT

## 2017-08-09 NOTE — MR AVS SNAPSHOT
After Visit Summary   2017    Bess Enamorado    MRN: [id number]           Patient Information     Date Of Birth          1975        Visit Information        Provider Department      2017 8:00 PM SLEEP LAB, BED FOUR [facility]        Today's Diagnoses     Family history of sleep apnea        Nocturia more than twice per night        Morbid obesity due to excess calories (H)        Lack of adequate sleep        Sleep apnea, unspecified type        Dyspnea and respiratory abnormality        Malaise and fatigue          Care Instructions    Pt arrived [facility] for sleep study.    Completed a split night PSG per provider order.    Preliminary AHI 42.7.  A final therapeutic PAP pressure was achieved.    Supine REM was seen on therapeutic pressure.    Patient reports feeling refreshed in AM.    MEDICATIONS:  zolpidem (AMBIEN) 5 MG tablet    desogestrel-ethinyl estradiol (APRI) 0.15-30 MG-MCG per tablet   PARoxetine (PAXIL) 30 MG tablet   metFORMIN (GLUCOPHAGE-XR) 500 MG 24 hr tablet   order for DME ()   TECFIDERA 240 MG CPDR   Cholecalciferol (VITAMIN D3 PO)   ibuprofen (IBU) 600 MG tablet   STUDY TYPE: Split Night--CPAP w/TCM; Normal Montage 4%  SLEEP AID: 5mg Ambien @ 2135  PAP ACCLIMATION: Pt fitted with [employer] DreamWear Pillows, Size S. She was allowed to acclimate to a low pressure of 4cmH2O. She tolerated the trial well with the exception of slight difficulty with exhaling.   RESPIRATORY EVENTS (Baseline): Hypopneas, Apneas, Mixed Apneas, and RERAs; Preliminary AHI: 53.1; SpO2 Dharmesh: 58%  SNORING: soft to moderate snoring, occasional wheezy breathing  TCO2 MONITORING: ABG: Ph Venous: 7.39pH; PCO2 Venous: 43mm Hg; PO2 Venous: 39mm Hg; Bicarbonate Venous: 26mmol/L; FIO2: Arm; Oxyhemoglobin Venous:  72%; Base Excess Venous: 0.4mmol/L TCM Baseline: 44/45mmHg, increasing to a high of 60mmHg while REM supine prior to treatment  SUPPLEMENTAL 02: n/a  HOB  ELEVATION: flat  TITRATION: Titration initiated at 4cmH2O CPAP to rule out CPAP, pressure increased to 5cmH2O for comfort and observation. Pressure increased to 8cmH2O for Obstructive events, snoring and hypopneas. While REM supine, pressure increased to [id number] for hypopneas and UAR which appeared largely effective.   LEAK: Mask headgear would slide up causing leak into the upper 20 s  FINAL MASK TYPE/SIZE: Respironics DreamWear Pillows, Size S  SLEEP STAGES: Highly fragmented sleep, NREM and REM supine observed prior to treatment; pt had difficulty returning to sleep after nocturia  ECG: NSR with periods of Tachycardia  MOVEMENTS: LMs with arousals  BEHAVIORS: Pt was cooperative and behaved appropriately; Nocturia X2  NOTES: E529, pt stated her legs were bothering her making it difficult to return to sleep. Replace chin electrodes.          Follow-ups after your visit        Your next 10 appointments already scheduled     Aug 15, 2017  1:00 PM CDT   Return Sleep Patient with SHANNAN Joseph   [facility]    [address] [facility] [facility] [address] [phone]              Who to contact     If you have questions or need follow up information about today's clinic visit or your schedule please contact [facility] directly at [phone].  Normal or non-critical lab and imaging results will be communicated to you by MyChart, letter or phone within 4 business days after the clinic has received the results. If you do not hear from us within 7 days, please contact the clinic through MyChart or phone. If you have a critical or abnormal lab result, we will notify you by phone as soon as possible.  Submit refill requests through [employer] or call your pharmacy and they will forward the refill request to us. Please allow 3 business days for your refill to be completed.          Additional Information About Your Visit        MyChart Information      [employer] gives you secure access to your electronic health record. If you see a primary care provider, you can also send messages to your care team and make appointments. If you have questions, please call your primary care clinic.  If you do not have a primary care provider, please call [phone] and they will assist you.        Care EveryWhere ID     This is your Care EveryWhere ID. This could be used by other organizations to access your [place] medical records  [id number]         Blood Pressure from Last 3 Encounters:   07/26/17 128/84   06/26/17 147/88   11/23/16 163/90    Weight from Last 3 Encounters:   07/26/17 (!) 145.2 kg (320 lb)   06/26/17 (!) 149.7 kg (330 lb)   03/09/17 (!) 147 kg (324 lb)              We Performed the Following     Comprehensive Sleep Study        Primary Care Provider Office Phone # Fax #    Jennifer Danielle Crowe [phone] [address] [facility] [address]        Equal Access to Services     [facility]: Hadii aad ku hadasho Soomaali, waaxda luqadaha, qaybta kaalmada adeegyada, waxay ignacioin hayivanna sarkar . So [facility] [phone].    ATENCIÓN: Si habla español, tiene a camacho disposición servicios gratuitos de asistencia lingüística. Llame al [phone].    We comply with applicable federal civil rights laws and [place] laws. We do not discriminate on the basis of race, color, national origin, age, disability sex, sexual orientation or gender identity.            Thank you!     Thank you for choosing [facility]  for your care. Our goal is always to provide you with excellent care. Hearing back from our patients is one way we can continue to improve our services. Please take a few minutes to complete the written survey that you may receive in the mail after your visit with us. Thank you!             Your Updated Medication List - Protect others around you: Learn how to safely use, store and throw away your medicines at  www.disposemymeds.org.          This list is accurate as of: 8/9/17 11:59 PM.  Always use your most recent med list.                   Brand Name Dispense Instructions for use Diagnosis    desogestrel-ethinyl estradiol 0.15-30 MG-MCG per tablet    APRI    112 tablet    Take 1 tablet by mouth daily Take as continuous daily dosing (omit placebo pills until after three months of hormone tablets.)    Polycystic ovaries, Encounter for surveillance of contraceptive pills        MG tablet   Generic drug:  ibuprofen      Take by mouth 3 times daily        metFORMIN 500 MG 24 hr tablet    GLUCOPHAGE-XR    180 tablet    Take 2 tablets (1,000 mg) by mouth daily (with dinner)    PCOS (polycystic ovarian syndrome)       PARoxetine 30 MG tablet    PAXIL    90 tablet    Take 1 tablet (30 mg) by mouth every morning    Anxiety       TECFIDERA 240 MG Cpdr   Generic drug:  dimethyl fumarate      Take 240 mg by mouth 2 times daily Prescribed and monitored by neurologist    MS (multiple sclerosis) (H)       VITAMIN D3 PO      Take 2,000 Units by mouth daily        zolpidem 5 MG tablet    AMBIEN    1 tablet    Take tablet by mouth 15 minutes prior to sleep, for Sleep Study

## 2017-08-10 NOTE — PATIENT INSTRUCTIONS
Pt arrived [facility] for sleep study.    Completed a split night PSG per provider order.    Preliminary AHI 42.7.  A final therapeutic PAP pressure was achieved.    Supine REM was seen on therapeutic pressure.    Patient reports feeling refreshed in AM.    MEDICATIONS:  zolpidem (AMBIEN) 5 MG tablet    desogestrel-ethinyl estradiol (APRI) 0.15-30 MG-MCG per tablet   PARoxetine (PAXIL) 30 MG tablet   metFORMIN (GLUCOPHAGE-XR) 500 MG 24 hr tablet   order for DME ()   TECFIDERA 240 MG CPDR   Cholecalciferol (VITAMIN D3 PO)   ibuprofen (IBU) 600 MG tablet   STUDY TYPE: Split Night--CPAP w/TCM; Normal Montage 4%  SLEEP AID: 5mg Ambien @ 2135  PAP ACCLIMATION: Pt fitted with Respironics DreamWear Pillows, Size S. She was allowed to acclimate to a low pressure of 4cmH2O. She tolerated the trial well with the exception of slight difficulty with exhaling.   RESPIRATORY EVENTS (Baseline): Hypopneas, Apneas, Mixed Apneas, and RERAs; Preliminary AHI: 53.1; SpO2 Dharmesh: 58%  SNORING: soft to moderate snoring, occasional wheezy breathing  TCO2 MONITORING: ABG: Ph Venous: 7.39pH; PCO2 Venous: 43mm Hg; PO2 Venous: 39mm Hg; Bicarbonate Venous: 26mmol/L; FIO2: Arm; Oxyhemoglobin Venous:  72%; Base Excess Venous: 0.4mmol/L TCM Baseline: 44/45mmHg, increasing to a high of 60mmHg while REM supine prior to treatment  SUPPLEMENTAL 02: n/a  HOB ELEVATION: flat  TITRATION: Titration initiated at 4cmH2O CPAP to rule out CPAP, pressure increased to 5cmH2O for comfort and observation. Pressure increased to 8cmH2O for Obstructive events, snoring and hypopneas. While REM supine, pressure increased to [id number] for hypopneas and UAR which appeared largely effective.   LEAK: Mask headgear would slide up causing leak into the upper 20 s  FINAL MASK TYPE/SIZE: Respironics DreamWear Pillows, Size S  SLEEP STAGES: Highly fragmented sleep, NREM and REM supine observed prior to treatment; pt had difficulty returning to sleep after nocturia  ECG:  NSR with periods of Tachycardia  MOVEMENTS: LMs with arousals  BEHAVIORS: Pt was cooperative and behaved appropriately; Nocturia X2  NOTES: E529, pt stated her legs were bothering her making it difficult to return to sleep. Replace chin electrodes.

## 2017-08-11 ENCOUNTER — TRANSFERRED RECORDS (OUTPATIENT)
Dept: HEALTH INFORMATION MANAGEMENT | Facility: CLINIC | Age: 42
End: 2017-08-11

## 2017-08-13 DIAGNOSIS — R09.02 HYPOXEMIA: ICD-10-CM

## 2017-08-13 DIAGNOSIS — G47.33 OSA (OBSTRUCTIVE SLEEP APNEA): Primary | ICD-10-CM

## 2017-08-13 NOTE — PROCEDURES
"SLEEP STUDY INTERPRETATION  SPLIT-NIGHT STUDY      Patient: CITLALY MICHEL  YOB: 1975  Study Date: 8/9/2017  MRN: [id number]  Referring Provider: Jennifer Crowe MD  Ordering Provider: Verito Husain PA-C    Indications for Polysomnography: The patient is a 41 y old Female who is 5' 4\" and weighs 320.0 lbs.  Her BMI is 55.3, [place] sleepiness scale is 5.0 and neck size is 43.0.  Relevant medical history includes multiple sclerosis, hypertension, RLS, anxiety, binge eating disorder,  PCOS and morbid obesity.  A diagnostic polysomnogram was performed to evaluate for BMI of 54, loud snoring, witnessed apnea, nocturia, non-refreshing sleep, fatigue/daytime sleepiness crowded oropharynx, morning headaches, neck circumference of 17\" and co-morbid HTN. STOP-BANG score is 6/8.  After 146.5 minutes of sleep time the patient exhibited sufficient respiratory events qualifying her for a CPAP trial which was then initiated.      Polysomnogram Data:  A full night polysomnogram recorded the standard physiologic parameters including EEG, EOG, EMG, ECG, nasal and oral airflow.  Respiratory parameters of chest and abdominal movements were recorded with respiratory inductance plethysmography.  Oxygen saturation was recorded by pulse oximetry.      Diagnostic PSG  Sleep Architecture:   The total recording time of the diagnostic portion of the study was 307.6 minutes.  The total sleep time was 146.5 minutes.  During the diagnostic portion of the study the sleep latency was normal at 27.6 minutes with Zolpidem 5mg.  REM latency was 62.5 minutes.  Arousal index was increased at 90.1 arousals per hour.  Sleep efficiency was poor at 47.6%.  Wake after sleep onset was 130.5 minutes.   The patient spent 29.0% of total sleep time in Stage N1, 42.7% in Stage N2, 0.0% in Stage N3 and 28.3% in REM.       Respiration: Severe NARCISA with sleep-associated hypoxemia, with likely REM hypoventilation given severe oxygen desaturations and " sustained hypoxemia in supine REM. Hypoventilation seen with TCM increasing by ~15 mmHg over baseline in REM.  Pre-study VBG was WNL.    Events - During the diagnostic portion of the study, the polysomnogram revealed a presence of 1 obstructive, 2 central, and - mixed apneas resulting in an apnea index of 1.2 events per hour.  There were 109 hypopneas resulting in a hypopnea index of 44.6 events per hour.  The combined apnea/hypopnea index was 45.9 events per hour.  The REM AHI was 75.2 events per hour.  The supine AHI was 47.9 events per hour.  The RERA index was 23.8 events per hour.  The RDI was 69.7 events per hour.     Snoring - was reported as soft to moderate.    Respiratory rate and pattern - was notable for normal respiratory rate and pattern.    Sustained Sleep Associated Hypoventilation - Transcutaneous carbon dioxide monitoring was used, and significant hypoventilation was present with a maximum change of ~15 mmHg.  Pre-study VBG was WNL with pH 7.39, pCO2 43, HCO3 26.    Sleep Associated Hypoxemia - (Greater than 5 minutes O2 sat below 89%) was present.  Baseline oxygen saturation was 92.9%. Lowest oxygen saturation was 54.9%.  Time spent less than or equal to 88% was 31.7 minutes.  Time spent less than or equal to 89% was 33.9 minutes.  69.7 23.8 45.9     Treatment PSG  Sleep Architecture:   At 02:57:24 AM the patient was placed on CPAP treatment and was titrated at pressures ranging from 4 cmH2O up to 10 cmH2O.  The total recording time of the treatment portion of the study was 243.7 minutes.  The total sleep time was 195.5 minutes.  During the treatment portion of the study the sleep latency was 6.5 minutes.  REM latency was 179.0 minutes.  Arousal index was increased at 71.5 arousals per hour.  Sleep efficiency was normal at 80.2%.  Wake after sleep onset was 41.0 minutes.  The patient spent 30.2% of total sleep time in Stage N1, 40.7% in Stage N2, 0.0% in Stage N3 and 29.2% in REM.        Respiration: CPAP at 10 cm H2O appeared optimal.  Included supine REM with normalization of SpO2 and TCM normalized to high-40 s.  The optimal pressure was 10 cmH2O with an AHI of 4.0 events per hour.  Time in REM supine on final pressure was 45.5 minutes.   This titration was considered optimal (residual AHI < 5 events per hour and including REM-supine sleep at final pressure).     Movement Activity: Quite frequent PLM s observed, ~20% associated with cortical arousals    Periodic Limb Movements  o During the diagnostic portion of the study, there were 157 PLMs recorded. The PLM index was 64.3 movements per hour.  The PLM Arousal Index was 13.9 per hour.  o During the treatment portion of the study, there were 292 PLMs recorded. The PLM index was 89.6 movements per hour.  The PLM Arousal Index was 18.7 per hour.    REM EMG Activity - Excessive transient / sustained muscle activity was not present.    Nocturnal Behavior - Abnormal sleep related behaviors were not noted during / arising out of NREM / REM sleep.    Bruxism - None apparent.    Cardiac Summary: Appears NSR  During the diagnostic portion of the study, the average pulse rate was 79.1 bpm.  The minimum pulse rate was 56.0 bpm while the maximum pulse rate was 171.0 bpm.    During the treatment portion of the study, the average pulse rate was 66.4 bpm.  The minimum pulse rate was 50.9 bpm while the maximum pulse rate was 83.1 bpm.     Arrhythmias were not noted.    Assessment:     Severe NARCISA with sleep-associated hypoxemia, with likely REM hypoventilation given severe oxygen desaturations and sustained hypoxemia in supine REM. Hypoventilation seen with TCM increasing by ~15 mmHg over baseline in REM.  Pre-study VBG was WNL.    CPAP at 10 cm H2O appeared optimal.  Included supine REM with normalization of SpO2 and TCM normalized to high-40 s.    Quite frequent PLM s observed, ~20% associated with cortical arousals    Recommendations:    Treatment of NARCISA  with CPAP at 10 cm H2O.  Recommend clinical follow up with sleep management team, for coaching and review of effectiveness and measures.    Advise regarding the risks of drowsy driving.    Suggest optimizing sleep schedule and avoiding sleep deprivation.    Weight management (if BMI > 30).    Pharmacologic therapy should be used for management of restless legs syndrome only if present and clinically indicated and not based on the presence of periodic limb movements alone.    Diagnostic Codes:    Obstructive Sleep Apnea G47.33    Sleep Hypoxemia/Hypoventilation G47.36     Unspecified Sleep Disturbance G47.9                 Table of Oximetry Distribution    Range(%) Time in range (min) Time in range (%) Time in or below range (min) Time in or below range (%)   0.0 - 88.0 31.7 5.9% 31.7 5.9%   0.0 - 89.0 33.9 6.3% 33.9 6.3%

## 2017-08-22 ENCOUNTER — OFFICE VISIT (OUTPATIENT)
Dept: SLEEP MEDICINE | Facility: CLINIC | Age: 42
End: 2017-08-22
Payer: COMMERCIAL

## 2017-08-22 ENCOUNTER — DOCUMENTATION ONLY (OUTPATIENT)
Dept: SLEEP MEDICINE | Facility: CLINIC | Age: 42
End: 2017-08-22
Payer: COMMERCIAL

## 2017-08-22 VITALS
DIASTOLIC BLOOD PRESSURE: 92 MMHG | HEIGHT: 64 IN | HEART RATE: 86 BPM | BODY MASS INDEX: 50.02 KG/M2 | SYSTOLIC BLOOD PRESSURE: 130 MMHG | WEIGHT: 293 LBS | OXYGEN SATURATION: 96 %

## 2017-08-22 DIAGNOSIS — G47.33 OSA (OBSTRUCTIVE SLEEP APNEA): Primary | ICD-10-CM

## 2017-08-22 PROCEDURE — 99214 OFFICE O/P EST MOD 30 MIN: CPT | Performed by: FAMILY MEDICINE

## 2017-08-22 RX ORDER — PRAMIPEXOLE DIHYDROCHLORIDE 0.5 MG/1
TABLET ORAL
Refills: 2 | COMMUNITY
Start: 2016-12-01 | End: 2018-07-03

## 2017-08-22 NOTE — PROGRESS NOTES
"  Chief Complaint   Patient presents with     Study Results     Discuss sleep study       Bess Enamorado is a 41 year old female who returns to [facility] for review of sleep testing results. She presented with symptoms suggestive of obstructive sleep apnea and increased risk for hypoventilation given BMI ~55.  Pertinent PMHx of multiple sclerosis, HTN, RLS anxiety, binge eating disorder, PCOS, morbid obesity.    Estimated body mass index is 54.9 kg/(m^2) as calculated from the following:    Height as of this encounter: 1.626 m (5' 4.02\").    Weight as of this encounter: 145.2 kg (320 lb).  Total score - [place]: 5 (2017  9:40 AM)  STOP-BAN/8    Polysomnography - Test date 2017    AHI 45.9, basline SpO2 92.9%, mike SpO2 54.9%, time of SpO2 <= 88% of 31.7 minutes.  Severe desaturations and sustained hypoxemia confined to singular supine REM period (no lateral REM observed for comparison).  Also seen to have TCM increase by ~15 mmHg over baseline in supine REM, consistent with hypoventilation.  Pre-study VBG was WNL.  CPAP titrated to 10 cm H2O and appeared optimal, including supine REM, with normalization of SpO2 and TCM normalized to low-40's.  Seen to have frequent PLM's (64.3 / hour on diagnostic, 89.6 / hour on treatment, ~20% associated with cortical arousals).    Problem List:  Patient Active Problem List    Diagnosis Date Noted     Benign essential hypertension 02/15/2016     Priority: Medium     Peroneal tendon rupture 2015     Priority: Medium     Morbid obesity (H) 2015     Priority: Medium     Oct 2016:  Starting [employer] program in [place], goal to lose 140 lbs over next 2 years       Papanicolaou smear of cervix with low grade squamous intraepithelial lesion (LGSIL) 2014     Priority: Medium     2014:Pap--LSIL. Neg high risk HPV. Plan cotest in 1 year (if this is ASCUS or LSIL then colp). In reminders  8/20/15: Pap - ASCUS, Neg HPV. Plan " "colp  9/14/15: [place] Bx & ECC - negative. Plan cotest in 1 year.   10/20/16: NIL Pap, Neg HPV. Plan cotest in 3 years.        Eating disorder 08/25/2013     Priority: Medium     Binge-eating disorder; social phobia  See psychological assessment from [facility], Dr. Anupama Bowie, 8/14/2013  Problem list name updated by automated process. Provider to review       Leg edema 04/19/2013     Priority: Medium     Dyspnea and respiratory abnormality 04/19/2013     Priority: Medium     Problem list name updated by automated process. Provider to review       Anxiety 06/28/2011     Priority: Medium     Followed by neurologist       Restless legs 06/28/2011     Priority: Medium     CARDIOVASCULAR SCREENING; LDL GOAL LESS THAN 160 10/31/2010     Priority: Medium     Constipation 09/18/2006     Priority: Medium     9/18/2006    Has tried otc suppository and otc lax.   Problem list name updated by automated process. Provider to review       Multiple sclerosis (H) 08/29/2005     Priority: Medium     9/18/200pt dx with MS 2004--dx by neurolgist and is followed by Dr. Jeet Nicholson, also MS nurse Heather Thomas       Polycystic ovaries 08/29/2005     Priority: Medium     Diagnosed in [place], had facial hair, overweight, carb craving, records being requested, never on metformin          BP (!) 130/92  Pulse 86  Ht 1.626 m (5' 4.02\")  Wt (!) 145.2 kg (320 lb)  SpO2 96%  BMI 54.9 kg/m2    Impression/Plan:    1.)  Severe NARCISA with sleep-associated hypoxemia, with likely REM-related hypoventilation   - CPAP at 10 cm H2O appeared optimal, including supine REM   - Discussed importance of treatment to normalize SpO2 / CO2 and reduce long-term cardiovascular risk factors   - Will arrange CPAP at 10 cm H2O    She will follow up with me in about 1 month(s).     Twenty-five minutes spent with patient, all of which were spent face-to-face counseling, consulting, coordinating plan of care.      Joseph Saravia MD, " MD    CC:  Jennifer Crowe (only for reference, does not automatically route).

## 2017-08-22 NOTE — PROGRESS NOTES
Patient was offered choice of vendor and chose [facility].  Patient Bess Enamorado was set up at [facility]  on August 22, 2017. Patient received a Resmed AirSense 10 CPAP. Pressures were set at 10 cm H2O.   Patient s ramp is 5 cm H2O for Auto and FLEX/EPR is EPR, 2.  Patient received a [employer]up[employer] Mask name: DREAMWEAR  Nasal mask Size Small, heated tubing and heated humidifier.  Patient is enrolled in the STM Program and does not need to meet compliance.   Jessica Phillip

## 2017-08-22 NOTE — PATIENT INSTRUCTIONS

## 2017-08-22 NOTE — MR AVS SNAPSHOT
After Visit Summary   8/22/2017    Bess Enamorado    MRN: [id number]           Patient Information     Date Of Birth          1975        Visit Information        Provider Department      8/22/2017 1:00 PM Joseph Saravia MD [facility]        Today's Diagnoses     NARCISA (obstructive sleep apnea)    -  1      Care Instructions      Your BMI is Body mass index is 54.9 kg/(m^2).  Weight management is a personal decision.  If you are interested in exploring weight loss strategies, the following discussion covers the approaches that may be successful. Body mass index (BMI) is one way to tell whether you are at a healthy weight, overweight, or obese. It measures your weight in relation to your height.  A BMI of 18.5 to 24.9 is in the healthy range. A person with a BMI of 25 to 29.9 is considered overweight, and someone with a BMI of 30 or greater is considered obese. More than two-thirds of American adults are considered overweight or obese.  Being overweight or obese increases the risk for further weight gain. Excess weight may lead to heart disease and diabetes.  Creating and following plans for healthy eating and physical activity may help you improve your health.  Weight control is part of healthy lifestyle and includes exercise, emotional health, and healthy eating habits. Careful eating habits lifelong are the mainstay of weight control. Though there are significant health benefits from weight loss, long-term weight loss with diet alone may be very difficult to achieve- studies show long-term success with dietary management in less than 10% of people. Attaining a healthy weight may be especially difficult to achieve in those with severe obesity. In some cases, medications, devices and surgical management might be considered.  What can you do?  If you are overweight or obese and are interested in methods for weight loss, you should discuss this with your provider.      Consider reducing daily calorie intake by 500 calories.     Keep a food journal.     Avoiding skipping meals, consider cutting portions instead.    Diet combined with exercise helps maintain muscle while optimizing fat loss. Strength training is particularly important for building and maintaining muscle mass. Exercise helps reduce stress, increase energy, and improves fitness. Increasing exercise without diet control, however, may not burn enough calories to loose weight.       Start walking three days a week 10-20 minutes at a time    Work towards walking thirty minutes five days a week     Eventually, increase the speed of your walking for 1-2 minutes at time    In addition, we recommend that you review healthy lifestyles and methods for weight loss available through the National Institutes of Health patient information sites:  http://win.niddk.nih.gov/publications/index.htm    And look into health and wellness programs that may be available through your health insurance provider, employer, local community center, or [employer].    Weight management plan: Patient was referred to their PCP to discuss a diet and exercise plan.      Your Body mass index is 54.9 kg/(m^2).  Weight management is a personal decision.  If you are interested in exploring weight loss strategies, the following discussion covers the approaches that may be successful. Body mass index (BMI) is one way to tell whether you are at a healthy weight, overweight, or obese. It measures your weight in relation to your height.  A BMI of 18.5 to 24.9 is in the healthy range. A person with a BMI of 25 to 29.9 is considered overweight, and someone with a BMI of 30 or greater is considered obese. More than two-thirds of American adults are considered overweight or obese.  Being overweight or obese increases the risk for further weight gain. Excess weight may lead to heart disease and diabetes.  Creating and following plans for healthy eating and  physical activity may help you improve your health.  Weight control is part of healthy lifestyle and includes exercise, emotional health, and healthy eating habits. Careful eating habits lifelong are the mainstay of weight control. Though there are significant health benefits from weight loss, long-term weight loss with diet alone may be very difficult to achieve- studies show long-term success with dietary management in less than 10% of people. Attaining a healthy weight may be especially difficult to achieve in those with severe obesity. In some cases, medications, devices and surgical management might be considered.  What can you do?  If you are overweight or obese and are interested in methods for weight loss, you should discuss this with your provider.     Consider reducing daily calorie intake by 500 calories.     Keep a food journal.     Avoiding skipping meals, consider cutting portions instead.    Diet combined with exercise helps maintain muscle while optimizing fat loss. Strength training is particularly important for building and maintaining muscle mass. Exercise helps reduce stress, increase energy, and improves fitness. Increasing exercise without diet control, however, may not burn enough calories to loose weight.       Start walking three days a week 10-20 minutes at a time    Work towards walking thirty minutes five days a week     Eventually, increase the speed of your walking for 1-2 minutes at time    In addition, we recommend that you review healthy lifestyles and methods for weight loss available through the National Institutes of Health patient information sites:  http://win.niddk.nih.gov/publications/index.htm    And look into health and wellness programs that may be available through your health insurance provider, employer, local community center, or [employer] club.    Weight management plan: Patient was referred to their PCP to discuss a diet and exercise plan.            Follow-ups after your  "visit        Who to contact     If you have questions or need follow up information about today's clinic visit or your schedule please contact [facility] directly at [phone].  Normal or non-critical lab and imaging results will be communicated to you by MyChart, letter or phone within 4 business days after the clinic has received the results. If you do not hear from us within 7 days, please contact the clinic through MyChart or phone. If you have a critical or abnormal lab result, we will notify you by phone as soon as possible.  Submit refill requests through [employer] or call your pharmacy and they will forward the refill request to us. Please allow 3 business days for your refill to be completed.          Additional Information About Your Visit        [email or web address] Information     [employer] gives you secure access to your electronic health record. If you see a primary care provider, you can also send messages to your care team and make appointments. If you have questions, please call your primary care clinic.  If you do not have a primary care provider, please call [phone] and they will assist you.        Care EveryWhere ID     This is your Care EveryWhere ID. This could be used by other organizations to access your [place] medical records  [id number]        Your Vitals Were     Pulse Height Pulse Oximetry BMI (Body Mass Index)          86 1.626 m (5' 4.02\") 96% 54.9 kg/m2         Blood Pressure from Last 3 Encounters:   08/22/17 (!) 130/92   07/26/17 128/84   06/26/17 147/88    Weight from Last 3 Encounters:   08/22/17 (!) 145.2 kg (320 lb)   07/26/17 (!) 145.2 kg (320 lb)   06/26/17 (!) 149.7 kg (330 lb)              We Performed the Following     Sleep Comprehensive DME        Primary Care Provider Office Phone # Fax #    Jennifer Crowe [phone] [id number]  [facility] [address]        Equal Access to Services     CARIE DUNCAN AH: Reji shaw " Alessandracarlso, noemi luben, cori kasara jaquez, gerry coats kelechineena mckoysukhwinder cherrie. So [facility] [phone].    ATENCIÓN: Si rupa hall, tiene a camacho disposición servicios gratuitos de asistencia lingüística. Arnulfo al [phone].    We comply with applicable federal civil rights laws and [place] laws. We do not discriminate on the basis of race, color, national origin, age, disability sex, sexual orientation or gender identity.            Thank you!     Thank you for choosing [facility]  for your care. Our goal is always to provide you with excellent care. Hearing back from our patients is one way we can continue to improve our services. Please take a few minutes to complete the written survey that you may receive in the mail after your visit with us. Thank you!             Your Updated Medication List - Protect others around you: Learn how to safely use, store and throw away your medicines at www.disposemymeds.org.          This list is accurate as of: 8/22/17  2:02 PM.  Always use your most recent med list.                   Brand Name Dispense Instructions for use Diagnosis    desogestrel-ethinyl estradiol 0.15-30 MG-MCG per tablet    APRI    112 tablet    Take 1 tablet by mouth daily Take as continuous daily dosing (omit placebo pills until after three months of hormone tablets.)    Polycystic ovaries, Encounter for surveillance of contraceptive pills        MG tablet   Generic drug:  ibuprofen      Take by mouth 3 times daily        metFORMIN 500 MG 24 hr tablet    GLUCOPHAGE-XR    180 tablet    Take 2 tablets (1,000 mg) by mouth daily (with dinner)    PCOS (polycystic ovarian syndrome)       order for DME     1 Device    Equipment being ordered: Wheelchair  ISRAEL:4 weeks Pt is Non-Weight bearing at all times.    Cellulitis and abscess of foot, except toes, Nondisplaced fracture of second metatarsal bone of left foot       PARoxetine 30 MG tablet    PAXIL    90 tablet     Take 1 tablet (30 mg) by mouth every morning    Anxiety       pramipexole 0.5 MG tablet    MIRAPEX     TAKE 2 TABLETS BY MOUTH NIGHTLY AT BEDTIME AS NEEDED FOR SPASMS.        TECFIDERA 240 MG Cpdr   Generic drug:  dimethyl fumarate      Take 240 mg by mouth 2 times daily Prescribed and monitored by neurologist    MS (multiple sclerosis) (H)       VITAMIN D3 PO      Take 2,000 Units by mouth daily

## 2017-08-22 NOTE — NURSING NOTE
"Chief Complaint   Patient presents with     Study Results     Discuss sleep study       Initial BP (!) 130/92  Pulse 86  Ht 1.626 m (5' 4.02\")  Wt (!) 145.2 kg (320 lb)  SpO2 96%  BMI 54.9 kg/m2 Estimated body mass index is 54.9 kg/(m^2) as calculated from the following:    Height as of this encounter: 1.626 m (5' 4.02\").    Weight as of this encounter: 145.2 kg (320 lb).  Medication Reconciliation: complete  "

## 2017-08-28 ENCOUNTER — DOCUMENTATION ONLY (OUTPATIENT)
Dept: SLEEP MEDICINE | Facility: CLINIC | Age: 42
End: 2017-08-28

## 2017-08-28 NOTE — PROGRESS NOTES
3 DAY STM VISIT    Patient contacted for 3 day STM visit  Message left for patient to return call     Device type: CPAP  PAP settings: CPAP fixed 10 cm  H20       Assessment: Nightly usage over four hours.  Action plan: Pt to have f/u 14 day [facility] visit.  Diagnostic AHI: 45.9

## 2017-09-06 ENCOUNTER — DOCUMENTATION ONLY (OUTPATIENT)
Dept: SLEEP MEDICINE | Facility: CLINIC | Age: 42
End: 2017-09-06

## 2017-09-06 NOTE — PROGRESS NOTES
14 DAY STM VISIT    Subjective measures:   Patient hasn't used CPAP the last few days because she need to clean it. Patient stated otherwise everything is going well with her CPAP.     Assessment: Pt not meeting objective benchmarks for compliance  Patient meeting subjective benchmarks.   Action plan: pt to have 30 day STM visit.    Device type: CPAP  PAP settings: CPAP fixed 10 cm  H20    95th% pressure 0 cm  H20   Objective measures: 14 day rolling measures      Compliance  64 %      Leak  24.96 lpm  last  upload      AHI 0.61   last  upload      Average number of minutes 267    Diagnostic AHI: 45.9     Objective measure goal  Compliance   Goal >70%  Leak   Goal < 24 lpm  AHI  Goal < 5  Usage  Goal >240

## 2017-09-22 ENCOUNTER — DOCUMENTATION ONLY (OUTPATIENT)
Dept: SLEEP MEDICINE | Facility: CLINIC | Age: 42
End: 2017-09-22

## 2017-09-22 NOTE — PROGRESS NOTES
30 DAY STM VISIT    Message left for patient to return call     Assessment: Pt not meeting objective benchmarks for compliance.  Action plan: Waiting for patient to return call. , 2 week STM recheck appt scheduled and pt to have 6 month [facility] visit.  Patient does not have a follow up appointment.   Device type: CPAP  PAP settings: CPAP fixed 10 cm  H20    95th% pressure 0 cm  H20   Objective measures: 14 day rolling measures      Compliance  28 %      Leak  27.6 lpm  last  upload      AHI 0.55   last  upload      Average number of minutes 114    Diagnostic AHI: 45.9         Objective measure goal  Compliance   Goal >70%  Leak   Goal < 24 lpm  AHI  Goal < 5  Usage  Goal >240

## 2017-10-10 ENCOUNTER — DOCUMENTATION ONLY (OUTPATIENT)
Dept: SLEEP MEDICINE | Facility: CLINIC | Age: 42
End: 2017-10-10

## 2017-10-10 NOTE — PROGRESS NOTES
STM Re-Check: Patient does well when she uses CPAP. Patient doesn't use her machine night. Patient needs to get water for her machine and hasn't been using it. Patient says everything fine with machine. Patient will be taken out of the STM.

## 2017-11-14 ENCOUNTER — TRANSFERRED RECORDS (OUTPATIENT)
Dept: HEALTH INFORMATION MANAGEMENT | Facility: CLINIC | Age: 42
End: 2017-11-14

## 2018-01-24 ENCOUNTER — TRANSFERRED RECORDS (OUTPATIENT)
Dept: HEALTH INFORMATION MANAGEMENT | Facility: CLINIC | Age: 43
End: 2018-01-24

## 2018-02-20 ENCOUNTER — DOCUMENTATION ONLY (OUTPATIENT)
Dept: SLEEP MEDICINE | Facility: CLINIC | Age: 43
End: 2018-02-20
Payer: COMMERCIAL

## 2018-02-20 NOTE — PROGRESS NOTES
6 month [facility] Recheck Visit     Data only recheck     Assessment: Pt not meeting objective benchmarks for compliance. Patient not  Using her machine.   Action plan:   Pt to follow up per provider request (1-2 yrs)    Diagnostic AHI: 45.9     Device type: CPAP  PAP settings: CPAP min  cm  H20     CPAP max  cm  H20    CPAP fixed 10 cm  H20    95th% pressure  cm  H20   Objective measures: 14 day rolling measures      Compliance  0 %      Leak   lpm  last  upload      AHI    last  upload      Average number of minutes 0      Objective measure goal  Compliance   Goal >70%  Leak   Goal < 24 lpm  AHI  Goal < 5  Usage  Goal >240

## 2018-02-24 ENCOUNTER — APPOINTMENT (OUTPATIENT)
Dept: GENERAL RADIOLOGY | Facility: CLINIC | Age: 43
End: 2018-02-24
Attending: FAMILY MEDICINE
Payer: COMMERCIAL

## 2018-02-24 ENCOUNTER — HOSPITAL ENCOUNTER (EMERGENCY)
Facility: CLINIC | Age: 43
Discharge: HOME | End: 2018-02-24
Attending: EMERGENCY MEDICINE | Admitting: EMERGENCY MEDICINE
Payer: COMMERCIAL

## 2018-02-24 VITALS
BODY MASS INDEX: 61.76 KG/M2 | SYSTOLIC BLOOD PRESSURE: 144 MMHG | RESPIRATION RATE: 16 BRPM | TEMPERATURE: 97.8 F | OXYGEN SATURATION: 94 % | DIASTOLIC BLOOD PRESSURE: 73 MMHG | WEIGHT: 293 LBS | HEART RATE: 110 BPM

## 2018-02-24 DIAGNOSIS — S89.92XA INJURY OF LEFT LOWER EXTREMITY, INITIAL ENCOUNTER: ICD-10-CM

## 2018-02-24 DIAGNOSIS — L03.116 CELLULITIS OF LEFT LOWER EXTREMITY: ICD-10-CM

## 2018-02-24 PROCEDURE — 99283 EMERGENCY DEPT VISIT LOW MDM: CPT | Performed by: FAMILY MEDICINE

## 2018-02-24 PROCEDURE — 2894A VOIDCORRECT: CPT | Mod: Z6 | Performed by: OPTOMETRIST

## 2018-02-24 PROCEDURE — 73590 X-RAY EXAM OF LOWER LEG: CPT | Mod: LT

## 2018-02-24 PROCEDURE — 99283 EMERGENCY DEPT VISIT LOW MDM: CPT | Performed by: OPTOMETRIST

## 2018-02-24 RX ORDER — CEPHALEXIN 500 MG/1
500 CAPSULE ORAL 4 TIMES DAILY
Qty: 40 CAPSULE | Refills: 0 | Status: SHIPPED | OUTPATIENT
Start: 2018-02-24 | End: 2018-03-06

## 2018-02-24 ASSESSMENT — ENCOUNTER SYMPTOMS
CHILLS: 0
MYALGIAS: 1
FEVER: 0
SHORTNESS OF BREATH: 0

## 2018-02-24 NOTE — ED AVS SNAPSHOT
[facility] Emergency Department    [address] [address]    Phone:  [phone]    Fax:  [phone]                                       Bess Enamorado   MRN: [id number]    Department:  [facility] Emergency Department   Date of Visit:  2/24/2018           Patient Information     Date Of Birth          1975        Your diagnoses for this visit were:     Injury of left lower extremity, initial encounter     Cellulitis of left lower extremity Take keflex four times daily.  recheck monday in clinic. return for fever, worsening.       You were seen by Finesse Wilcox DO and Spenser Turner MD.        Discharge Instructions         ICD-10-CM    1. Injury of left lower extremity, initial encounter S89.92XA    2. Cellulitis of left lower extremity L03.116     Take keflex four times daily.  recheck monday in clinic. return for fever, worsening.         Cellulitis  Cellulitis is an infection of the deep layers of skin. A break in the skin, such as a cut or scratch, can let bacteria under the skin. If the bacteria get to deep layers of the skin, it can be serious. If not treated, cellulitis can get into the bloodstream and lymph nodes. The infection can then spread throughout the body. This causes serious illness.  Cellulitis causes the affected skin to become red, swollen, warm, and sore. The reddened areas have a visible border. An open sore may leak fluid (pus). You may have a fever, chills, and pain.  Cellulitis is treated with antibiotics taken for 7 to 10 days. An open sore may be cleaned and covered with cool wet gauze. Symptoms should get better 1 to 2 days after treatment is started. Make sure to take all the antibiotics for the full number of days until they are gone. Keep taking the medicine even if your symptoms go away.  Home care  Follow these tips:    Limit the use of the part of your body with cellulitis.     If the infection is on your leg, keep your leg raised  while sitting. This will help to reduce swelling.    Take all of the antibiotic medicine exactly as directed until it is gone. Do not miss any doses, especially during the first 7 days. Don t stop taking the medicine when your symptoms get better.    Keep the affected area clean and dry.    Wash your hands with soap and warm water before and after touching your skin. Anyone else who touches your skin should also wash his or her hands. Don't share towels.  Follow-up care  Follow up with your healthcare provider, or as advised. If your infection does not go away on the first antibiotic, your healthcare provider will prescribe a different one.  When to seek medical advice  Call your healthcare provider right away if any of these occur:    Red areas that spread    Swelling or pain that gets worse    Fluid leaking from the skin (pus)    Fever higher of 100.4  F (38.0  C) or higher after 2 days on antibiotics  Date Last Reviewed: 9/1/2016 2000-2017 [employer]. [address] [place] [address]. All rights reserved. This information is not intended as a substitute for professional medical care. Always follow your healthcare professional's instructions.          24 Hour Appointment Hotline       To make an appointment at any [facility], call [phone]). If you don't have a family doctor or clinic, we will help you find one. [facility] clinics are conveniently located to serve the needs of you and your family.             Review of your medicines      START taking        Dose / Directions Last dose taken    cephALEXin 500 MG capsule   Commonly known as:  KEFLEX   Dose:  500 mg   Quantity:  40 capsule        Take 1 capsule (500 mg) by mouth 4 times daily for 10 days   Refills:  0          Our records show that you are taking the medicines listed below. If these are incorrect, please call your family doctor or clinic.        Dose / Directions Last dose taken     desogestrel-ethinyl estradiol 0.15-30 MG-MCG per tablet   Commonly known as:  APRI   Dose:  1 tablet   Quantity:  112 tablet        Take 1 tablet by mouth daily Take as continuous daily dosing (omit placebo pills until after three months of hormone tablets.)   Refills:  3         MG tablet   Generic drug:  ibuprofen        Take by mouth 3 times daily   Refills:  0        metFORMIN 500 MG 24 hr tablet   Commonly known as:  GLUCOPHAGE-XR   Dose:  1000 mg   Quantity:  180 tablet        Take 2 tablets (1,000 mg) by mouth daily (with dinner)   Refills:  1        order for DME        Equipment ordered: RESMED CPAP Mask type: Nasal Settings: 10 CM H2O   Refills:  0        order for DME   Quantity:  1 Device        Equipment being ordered: Wheelchair  ISRAEL:4 weeks Pt is Non-Weight bearing at all times.   Refills:  0        PARoxetine 30 MG tablet   Commonly known as:  PAXIL   Dose:  30 mg   Quantity:  90 tablet        Take 1 tablet (30 mg) by mouth every morning   Refills:  1        pramipexole 0.5 MG tablet   Commonly known as:  MIRAPEX        TAKE 2 TABLETS BY MOUTH NIGHTLY AT BEDTIME AS NEEDED FOR SPASMS.   Refills:  2        TECFIDERA 240 MG Cpdr   Dose:  240 mg   Generic drug:  dimethyl fumarate        Take 240 mg by mouth 2 times daily Prescribed and monitored by neurologist   Refills:  0        VITAMIN D3 PO   Dose:  2000 Units        Take 2,000 Units by mouth daily   Refills:  0                Prescriptions were sent or printed at these locations (1 Prescription)                   [facility] [address] [place] [address] [address]    Telephone:  [phone]   Fax:  [phone]   Hours:                  E-Prescribed (1 of 1)         cephALEXin (KEFLEX) 500 MG capsule                Procedures and tests performed during your visit     Tib/Fib XR, left      Orders Needing Specimen Collection     None      Pending Results     No orders found from 2/22/2018  to 2/25/2018.            Pending Culture Results     No orders found from 2/22/2018 to 2/25/2018.            Pending Results Instructions     If you had any lab results that were not finalized at the time of your Discharge, you can call the ED Lab Result RN at [phone]. You will be contacted by this team for any positive Lab results or changes in treatment. The nurses are available 7 days a week from 10A to 6:30P.  You can leave a message 24 hours per day and they will return your call.        Test Results From Your Hospital Stay        2/24/2018  1:08 PM      Narrative     TIBIA FIBULA LEFT  TWO-THREE VIEWS 2/24/2018 12:41 PM     HISTORY: fall, medial tib fib pain;     COMPARISON: None.        Impression     IMPRESSION : No acute fracture or dislocation. No acute bony  abnormality.    DEEJAY RIZVI MD                Thank you for choosing [facility]       Thank you for choosing [facility] for your care. Our goal is always to provide you with excellent care. Hearing back from our patients is one way we can continue to improve our services. Please take a few minutes to complete the written survey that you may receive in the mail after you visit with us. Thank you!        [facility] Information     [employer] gives you secure access to your electronic health record. If you see a primary care provider, you can also send messages to your care team and make appointments. If you have questions, please call your primary care clinic.  If you do not have a primary care provider, please call [phone] and they will assist you.        Care EveryWhere ID     This is your Care EveryWhere ID. This could be used by other organizations to access your [facility] medical records  [id number]        Equal Access to Services     CARIE DUNCAN : Reji Ramos, waleonardo luben, qaybta kaalgerry melendez. So [facility] [phone].    ATENCIÓN: Si habla español, tiene a camacho disposición  servicios gratuitos de asistencia lingüística. Arnulfo denise [phone].    We comply with applicable federal civil rights laws and [place] laws. We do not discriminate on the basis of race, color, national origin, age, disability, sex, sexual orientation, or gender identity.            After Visit Summary       This is your record. Keep this with you and show to your community pharmacist(s) and doctor(s) at your next visit.

## 2018-02-24 NOTE — ED PROVIDER NOTES
History     Chief Complaint   Patient presents with     Leg Pain     L leh pain, slip and fell last week, L calf pain and redness, concern of cellulitits.     Fell 1 week ago in shower. Injured left lower leg. Noted pain to left leg increases throughout the week. Pt is obese, has MS.  normaly has  issues with left foot.  Redness, tenderness, to calf. Able to stand and bear wt. Noted slight discomfort  Putting on foot/ ankle brace this past week             HPI  Bess Enamorado is a 42 year old female who to the emergency department with history of multiple sclerosis and overweightness with an injury to the left lower leg that occurred 1 week ago while in the shower she fell to the ground to her knees striking her left calf.  Since then she has developed some focal redness and tenderness on the medial leg but is able to stand and bear weight.  Has chronic ankle foot pain but no acute injury.   she is concerned for possible infection at the injury site.   No fever.  on Tecfidera for MS.    Problem List:    Patient Active Problem List    Diagnosis Date Noted     NARCISA (obstructive sleep apnea) 08/22/2017     Priority: Medium     Severe NARCISA with sleep-associated hypoxemia, with likely REM-related hypoventilation  Polysomnography - Test date 8/9/2017  AHI 45.9, basline SpO2 92.9%, mike SpO2 54.9%, time of SpO2 <= 88% of 31.7 minutes.  Severe desaturations and sustained hypoxemia confined to singular supine REM period (no lateral REM observed for comparison).  Also seen to have TCM increase by ~15 mmHg over baseline in supine REM, consistent with hypoventilation.  Pre-study VBG was WNL.  CPAP titrated to 10 cm H2O and appeared optimal, including supine REM, with normalization of SpO2 and TCM normalized to low-40's.  Seen to have frequent PLM's (64.3 / hour on diagnostic, 89.6 / hour on treatment, ~20% associated with cortical arousals).         Benign essential hypertension 02/15/2016     Priority: Medium     Peroneal  tendon rupture 11/23/2015     Priority: Medium     Morbid obesity (H) 08/24/2015     Priority: Medium     Oct 2016:  Starting [employer] program in [place], goal to lose 140 lbs over next 2 years       Papanicolaou smear of cervix with low grade squamous intraepithelial lesion (LGSIL) 08/03/2014     Priority: Medium     7/29/2014:Pap--LSIL. Neg high risk HPV. Plan cotest in 1 year (if this is ASCUS or LSIL then colp). In reminders  8/20/15: Pap - ASCUS, Neg HPV. Plan colp  9/14/15: Strang Bx & ECC - negative. Plan cotest in 1 year.   10/20/16: NIL Pap, Neg HPV. Plan cotest in 3 years.        Eating disorder 08/25/2013     Priority: Medium     Binge-eating disorder; social phobia  See psychological assessment from [facility], Dr. Anupama Bowie, 8/14/2013  Problem list name updated by automated process. Provider to review       Leg edema 04/19/2013     Priority: Medium     Dyspnea and respiratory abnormality 04/19/2013     Priority: Medium     Problem list name updated by automated process. Provider to review       Anxiety 06/28/2011     Priority: Medium     Followed by neurologist       Restless legs 06/28/2011     Priority: Medium     CARDIOVASCULAR SCREENING; LDL GOAL LESS THAN 160 10/31/2010     Priority: Medium     Constipation 09/18/2006     Priority: Medium     9/18/2006    Has tried otc suppository and otc lax.   Problem list name updated by automated process. Provider to review       Multiple sclerosis (H) 08/29/2005     Priority: Medium     9/18/200pt dx with MS 2004--dx by neurolgist and is followed by Dr. Jeet Nicholson, also MS nurse Heather Thomas       Polycystic ovaries 08/29/2005     Priority: Medium     Diagnosed in [place], had facial hair, overweight, carb craving, records being requested, never on metformin          Past Medical History:    Past Medical History:   Diagnosis Date     ASCUS favor benign 8/2015     H/O colposcopy with cervical biopsy 9/14/15     LSIL on Pap smear 7/2014      Multiple sclerosis (H) 8/29/2005     Shingles 10/2016     UNSPEC CONSTIPATION 9/18/2006       Past Surgical History:    Past Surgical History:   Procedure Laterality Date     CHOLECYSTECTOMY, LAPOROSCOPIC      Cholecystectomy, Laparoscopic     OPEN REDUCTION INTERNAL FIXATION TOE(S)  4/13/2012    Procedure:OPEN REDUCTION INTERNAL FIXATION TOE(S); Open reduction internal fixation right proximal fifth metatarsal fracture-   Anes-choice block; Surgeon:LEY, JEFFREY DUANE; Location:[facility] OR       Family History:    Family History   Problem Relation Age of Onset     Neurologic Disorder Father      MS     HEART DISEASE Father      irregular heart rate     DIABETES Father      Melanoma Father      Sleep Apnea Father      CANCER Maternal Grandfather      lung     CANCER Paternal Grandmother      stomach     CANCER Mother      Gynecology Maternal Aunt      PCOS       Social History:  Marital Status:  Single [1]  Social History   Substance Use Topics     Smoking status: Never Smoker     Smokeless tobacco: Never Used     Alcohol use Yes      Comment: social        Medications:      pramipexole (MIRAPEX) 0.5 MG tablet   order for DME   desogestrel-ethinyl estradiol (APRI) 0.15-30 MG-MCG per tablet   PARoxetine (PAXIL) 30 MG tablet   metFORMIN (GLUCOPHAGE-XR) 500 MG 24 hr tablet   order for DME   TECFIDERA 240 MG CPDR   Cholecalciferol (VITAMIN D3 PO)   ibuprofen (IBU) 600 MG tablet         Review of Systems   Constitutional: Negative for chills and fever.   Respiratory: Negative for shortness of breath.    Cardiovascular: Positive for leg swelling. Negative for chest pain.   Musculoskeletal: Positive for myalgias.   Skin: Positive for rash.          Physical Exam   BP: 144/73  Pulse: 110  Temp: 97.8  F (36.6  C)  Resp: 16  Weight: (!) 163.3 kg (360 lb)  SpO2: 94 %      Physical Exam     x23 x20 cm irregular region of erythema, ecchymosis, tenderness.  no obvious fluctuance/abscess.  skin is intact.  normal distal  pulsations.  chronic leg edema, and ankles are symmetric                     ED Course     ED Course     Procedures               Critical Care time:  none               Labs Ordered and Resulted from Time of ED Arrival Up to the Time of Departure from the ED - No data to display    Assessments & Plan (with Medical Decision Making)     <MDM: Bess Enamorado is a 42 year old female who presented with a history of multiple sclerosis and a left lower leg injury when she fell and the shower 1 week ago.  The region is ecchymotic and tender to touch but she was also concerned about a possible secondary infection after the fall.  She is on Tecfidera which may have some immunosuppressant activity.  X-ray of the tibia-fibula was negative for fracture.  She has chronic lower extremity edema but this does not appear to be new or increased at this time and legs appear to be symmetric other than the ecchymosis and focal swelling from the injury.  We did discuss that injury can predispose to DVT and precautions are given for that finding and for return.  Her evaluation of the site of the injury and for the surrounding erythema is unclear if this is cellulitis versus simply the evolution of ecchymosis in this region.  The skin appears to be intact but there are regions of erythema here that might be more consistent with cellulitis.  I therefore recommended we treat with Keflex with close interval follow-up.  I have set up a follow-up recheck on Monday.  I taken images of the wound site as above and marked margins.  I have reviewed the nursing notes.    I have reviewed the findings, diagnosis, plan and need for follow up with the patient.       New Prescriptions    No medications on file       Final diagnoses:   Injury of left lower extremity, initial encounter   Cellulitis of left lower extremity - Take keflex four times daily.  recheck monday in clinic. return for fever, worsening.       2/24/2018   [facility] Emergency  DEPARTMENT     Spenser Turner MD  02/24/18 [id number]

## 2018-02-24 NOTE — ED NOTES
Fell 1 week ago in shower. Injured left lower leg. Noted pain to left leg increases throughout the week. Pt is obese, has MS.  normaly has  issues with left foot.  Redness, tenderness, to calf. Able to stand and bear wt. Noted slight discomfort  Putting on foot/ ankle brace this past week

## 2018-02-24 NOTE — ED AVS SNAPSHOT
[facility] Emergency Department    [address] [address]    Phone:  [phone]    Fax:  [phone]                                       Bess Enamorado   MRN: [id number]    Department:  [facility] Emergency Department   Date of Visit:  2/24/2018           After Visit Summary Signature Page     I have received my discharge instructions, and my questions have been answered. I have discussed any challenges I see with this plan with the nurse or doctor.    ..........................................................................................................................................  Patient/Patient Representative Signature      ..........................................................................................................................................  Patient Representative Print Name and Relationship to Patient    ..................................................               ................................................  Date                                            Time    ..........................................................................................................................................  Reviewed by Signature/Title    ...................................................              ..............................................  Date                                                            Time

## 2018-02-24 NOTE — DISCHARGE INSTRUCTIONS
ICD-10-CM    1. Injury of left lower extremity, initial encounter S89.92XA    2. Cellulitis of left lower extremity L03.116     Take keflex four times daily.  recheck monday in clinic. return for fever, worsening.         Cellulitis  Cellulitis is an infection of the deep layers of skin. A break in the skin, such as a cut or scratch, can let bacteria under the skin. If the bacteria get to deep layers of the skin, it can be serious. If not treated, cellulitis can get into the bloodstream and lymph nodes. The infection can then spread throughout the body. This causes serious illness.  Cellulitis causes the affected skin to become red, swollen, warm, and sore. The reddened areas have a visible border. An open sore may leak fluid (pus). You may have a fever, chills, and pain.  Cellulitis is treated with antibiotics taken for 7 to 10 days. An open sore may be cleaned and covered with cool wet gauze. Symptoms should get better 1 to 2 days after treatment is started. Make sure to take all the antibiotics for the full number of days until they are gone. Keep taking the medicine even if your symptoms go away.  Home care  Follow these tips:    Limit the use of the part of your body with cellulitis.     If the infection is on your leg, keep your leg raised while sitting. This will help to reduce swelling.    Take all of the antibiotic medicine exactly as directed until it is gone. Do not miss any doses, especially during the first 7 days. Don t stop taking the medicine when your symptoms get better.    Keep the affected area clean and dry.    Wash your hands with soap and warm water before and after touching your skin. Anyone else who touches your skin should also wash his or her hands. Don't share towels.  Follow-up care  Follow up with your healthcare provider, or as advised. If your infection does not go away on the first antibiotic, your healthcare provider will prescribe a different one.  When to seek medical  advice  Call your healthcare provider right away if any of these occur:    Red areas that spread    Swelling or pain that gets worse    Fluid leaking from the skin (pus)    Fever higher of 100.4  F (38.0  C) or higher after 2 days on antibiotics  Date Last Reviewed: 9/1/2016 2000-2017 [employer]. [address] [place] [address]. All rights reserved. This information is not intended as a substitute for professional medical care. Always follow your healthcare professional's instructions.

## 2018-02-26 ENCOUNTER — OFFICE VISIT (OUTPATIENT)
Dept: FAMILY MEDICINE | Facility: CLINIC | Age: 43
End: 2018-02-26
Payer: COMMERCIAL

## 2018-02-26 VITALS
DIASTOLIC BLOOD PRESSURE: 95 MMHG | TEMPERATURE: 97.7 F | RESPIRATION RATE: 20 BRPM | WEIGHT: 293 LBS | OXYGEN SATURATION: 97 % | SYSTOLIC BLOOD PRESSURE: 146 MMHG | BODY MASS INDEX: 61.76 KG/M2

## 2018-02-26 DIAGNOSIS — L03.116 CELLULITIS OF LEFT LOWER EXTREMITY: Primary | ICD-10-CM

## 2018-02-26 DIAGNOSIS — F41.9 ANXIETY: ICD-10-CM

## 2018-02-26 PROCEDURE — 99213 OFFICE O/P EST LOW 20 MIN: CPT | Performed by: FAMILY MEDICINE

## 2018-02-26 RX ORDER — PAROXETINE 30 MG/1
30 TABLET, FILM COATED ORAL EVERY MORNING
Qty: 90 TABLET | Refills: 3 | Status: SHIPPED | OUTPATIENT
Start: 2018-02-26 | End: 2019-04-08

## 2018-02-26 ASSESSMENT — ANXIETY QUESTIONNAIRES
7. FEELING AFRAID AS IF SOMETHING AWFUL MIGHT HAPPEN: SEVERAL DAYS
IF YOU CHECKED OFF ANY PROBLEMS ON THIS QUESTIONNAIRE, HOW DIFFICULT HAVE THESE PROBLEMS MADE IT FOR YOU TO DO YOUR WORK, TAKE CARE OF THINGS AT HOME, OR GET ALONG WITH OTHER PEOPLE: NOT DIFFICULT AT ALL
2. NOT BEING ABLE TO STOP OR CONTROL WORRYING: SEVERAL DAYS
5. BEING SO RESTLESS THAT IT IS HARD TO SIT STILL: NOT AT ALL
GAD7 TOTAL SCORE: 7
6. BECOMING EASILY ANNOYED OR IRRITABLE: MORE THAN HALF THE DAYS
3. WORRYING TOO MUCH ABOUT DIFFERENT THINGS: SEVERAL DAYS
1. FEELING NERVOUS, ANXIOUS, OR ON EDGE: SEVERAL DAYS

## 2018-02-26 ASSESSMENT — PATIENT HEALTH QUESTIONNAIRE - PHQ9: 5. POOR APPETITE OR OVEREATING: SEVERAL DAYS

## 2018-02-26 NOTE — PATIENT INSTRUCTIONS
Thank you for choosing [facility].  You may be receiving a survey in the mail from [facility] regarding your visit today.  Please take a few minutes to complete and return the survey to let us know how we are doing.      Our Clinic hours are:  Mondays    7:20 am - 7 pm  Tues -  Fri  7:20 am - 5 pm    Clinic Phone: [phone]    The clinic lab opens at 7:30 am Mon - Fri and appointments are required.    [facility]. [phone]  Monday-Thursday 8 am - 7pm  Tues/Wed/Fri 8 am - 5:30 pm

## 2018-02-26 NOTE — MR AVS SNAPSHOT
After Visit Summary   2/26/2018    Bess Enamorado    MRN: [id number]           Patient Information     Date Of Birth          1975        Visit Information        Provider Department      2/26/2018 9:20 AM Alex Hugo MD [facility]        Today's Diagnoses     Cellulitis of left lower extremity    -  1    Anxiety          Care Instructions          Thank you for choosing [facility].  You may be receiving a survey in the mail from [facility] regarding your visit today.  Please take a few minutes to complete and return the survey to let us know how we are doing.      Our Clinic hours are:  Mondays    7:20 am - 7 pm  Tues -  Fri  7:20 am - 5 pm    Clinic Phone: [phone]    The clinic lab opens at 7:30 am Mon - Fri and appointments are required.    [facility]  Ph. [phone]  Monday-Thursday 8 am - 7pm  Tues/Wed/Fri 8 am - 5:30 pm                 Follow-ups after your visit        Who to contact     If you have questions or need follow up information about today's clinic visit or your schedule please contact [facility] directly at [phone].  Normal or non-critical lab and imaging results will be communicated to you by MyChart, letter or phone within 4 business days after the clinic has received the results. If you do not hear from us within 7 days, please contact the clinic through [employer] or phone. If you have a critical or abnormal lab result, we will notify you by phone as soon as possible.  Submit refill requests through [facility] or call your pharmacy and they will forward the refill request to us. Please allow 3 business days for your refill to be completed.          Additional Information About Your Visit        MyChart Information     [facility] gives you secure access to your electronic health record. If you see a primary care provider, you can also send messages to your care team and make appointments. If you have  questions, please call your primary care clinic.  If you do not have a primary care provider, please call [phone] and they will assist you.        Care EveryWhere ID     This is your Care EveryWhere ID. This could be used by other organizations to access your [place] medical records  [id number]        Your Vitals Were     Temperature Respirations Pulse Oximetry BMI (Body Mass Index)          97.7  F (36.5  C) 20 97% 61.76 kg/m2         Blood Pressure from Last 3 Encounters:   02/26/18 (!) 146/95   02/24/18 144/73   08/22/17 (!) 130/92    Weight from Last 3 Encounters:   02/26/18 (!) 360 lb (163.3 kg)   02/24/18 (!) 360 lb (163.3 kg)   08/22/17 (!) 320 lb (145.2 kg)              Today, you had the following     No orders found for display         Today's Medication Changes          These changes are accurate as of 2/26/18 10:38 AM.  If you have any questions, ask your nurse or doctor.               Stop taking these medicines if you haven't already. Please contact your care team if you have questions.     TECFIDERA 240 MG Cpdr   Generic drug:  dimethyl fumarate   Stopped by:  Alex Hugo MD                Where to get your medicines      These medications were sent to [place] [address] [place] [address]     Phone:  [phone]     PARoxetine 30 MG tablet                Primary Care Provider    Jennifer Crowe MD       No address on file        Equal Access to Services     [facility]: Hadii sepideh ku hadasho Soomaali, waaxda luqadaha, qaybta kaalmada adeegyada, gerry grier. So [facility] [phone].    ATENCIÓN: Si habla español, tiene a camacho disposición servicios gratuitos de asistencia lingüística. Llame al [phone].    We comply with applicable federal civil rights laws and [place] laws. We do not discriminate on the basis of race, color, national origin, age, disability, sex, sexual orientation, or gender  identity.            Thank you!     Thank you for choosing [facility]  for your care. Our goal is always to provide you with excellent care. Hearing back from our patients is one way we can continue to improve our services. Please take a few minutes to complete the written survey that you may receive in the mail after your visit with us. Thank you!             Your Updated Medication List - Protect others around you: Learn how to safely use, store and throw away your medicines at www.disposemymeds.org.          This list is accurate as of 2/26/18 10:38 AM.  Always use your most recent med list.                   Brand Name Dispense Instructions for use Diagnosis    cephALEXin 500 MG capsule    KEFLEX    40 capsule    Take 1 capsule (500 mg) by mouth 4 times daily for 10 days        desogestrel-ethinyl estradiol 0.15-30 MG-MCG per tablet    APRI    112 tablet    Take 1 tablet by mouth daily Take as continuous daily dosing (omit placebo pills until after three months of hormone tablets.)    Polycystic ovaries, Encounter for surveillance of contraceptive pills        MG tablet   Generic drug:  ibuprofen      Take by mouth 3 times daily        metFORMIN 500 MG 24 hr tablet    GLUCOPHAGE-XR    180 tablet    Take 2 tablets (1,000 mg) by mouth daily (with dinner)    PCOS (polycystic ovarian syndrome)       OCREVUS IV           order for DME      Equipment ordered: RESMED CPAP Mask type: Nasal Settings: 10 CM H2O        order for DME     1 Device    Equipment being ordered: Wheelchair  ISRAEL:4 weeks Pt is Non-Weight bearing at all times.    Cellulitis and abscess of foot, except toes, Nondisplaced fracture of second metatarsal bone of left foot       PARoxetine 30 MG tablet    PAXIL    90 tablet    Take 1 tablet (30 mg) by mouth every morning    Anxiety       pramipexole 0.5 MG tablet    MIRAPEX     TAKE 2 TABLETS BY MOUTH NIGHTLY AT BEDTIME AS NEEDED FOR SPASMS.        VITAMIN D3 PO      Take  2,000 Units by mouth daily

## 2018-02-26 NOTE — PROGRESS NOTES
SUBJECTIVE:   Bess Enamorado is a 42 year old female who presents to clinic today for the following health issues:      ED/UC Followup:    Facility:  [facility]   Date of visit: 2/24/18  Reason for visit: Cellulitis   Current Status: not red or hot now - but still painful but better          Anxiety Follow-Up    Status since last visit: No change    Other associated symptoms:None    Complicating factors:   Significant life event: Yes-  Family    Current substance abuse: None  Depression symptoms: Yes-    TALIB-7 SCORE 10/20/2016 11/14/2016 6/26/2017   Total Score - - -   Total Score 2 0 0       TALIB-7    Amount of exercise or physical activity: None    Problems taking medications regularly: No    Medication side effects: none    Diet: regular (no restrictions)            Problem list and histories reviewed & adjusted, as indicated.  Additional history:         Reviewed and updated as needed this visit by clinical staff  Tobacco  Allergies  Meds  Med Hx  Surg Hx  Fam Hx  Soc Hx      Reviewed and updated as needed this visit by Provider                 Problem list and histories reviewed & adjusted, as indicated.  Additional history: as documented        Reviewed and updated as needed this visit by clinical staff       Reviewed and updated as needed this visit by Provider      Further history obtained, clarified or corrected by physician:  She has no new complaints.  She does need refill of her Paxil.    OBJECTIVE:  BP (!) 146/95  Temp 97.7  F (36.5  C)  Resp 20  Wt (!) 360 lb (163.3 kg)  SpO2 97%  BMI 61.76 kg/m2  LUNGS: clear to auscultation, normal breath sounds  CV: RRR without murmur  ABD: BS+, soft, nontender, no masses, no hepatosplenomegaly  EXTREMITIES: without joint tenderness, swelling or erythema.  No muscle tenderness or abnormality.  The area of cellulitis/bruising on the left lower extremity persists however the discoloration has receded from the ink marks that were placed.  There is some  edema in the lower extremity.  SKIN: No rashes or abnormalities  NEURO:non focal exam    ASSESSMENT:     Cellulitis of left lower extremity  Anxiety    PLAN:  Continue Keflex until finished  Leg elevation as much as possible  Return for worsening or persisting problems.    Orders Placed This Encounter     Ocrelizumab (OCREVUS IV)     PARoxetine (PAXIL) 30 MG tablet

## 2018-02-26 NOTE — NURSING NOTE
"Chief Complaint   Patient presents with     ER F/U       Initial BP (!) 150/98 (BP Location: Right arm, Patient Position: Chair, Cuff Size: Adult Large)  Temp 97.7  F (36.5  C)  Resp 20  Wt (!) 360 lb (163.3 kg)  BMI 61.76 kg/m2 Estimated body mass index is 61.76 kg/(m^2) as calculated from the following:    Height as of 8/22/17: 5' 4.02\" (1.626 m).    Weight as of this encounter: 360 lb (163.3 kg).  Medication Reconciliation: complete   Bee Malcolm CMA      "

## 2018-02-27 ASSESSMENT — PATIENT HEALTH QUESTIONNAIRE - PHQ9: SUM OF ALL RESPONSES TO PHQ QUESTIONS 1-9: 5

## 2018-02-27 ASSESSMENT — ANXIETY QUESTIONNAIRES: GAD7 TOTAL SCORE: 7

## 2018-03-23 ENCOUNTER — OFFICE VISIT (OUTPATIENT)
Dept: FAMILY MEDICINE | Facility: CLINIC | Age: 43
End: 2018-03-23
Payer: COMMERCIAL

## 2018-03-23 VITALS
WEIGHT: 293 LBS | DIASTOLIC BLOOD PRESSURE: 91 MMHG | OXYGEN SATURATION: 95 % | HEART RATE: 108 BPM | BODY MASS INDEX: 50.02 KG/M2 | SYSTOLIC BLOOD PRESSURE: 138 MMHG | RESPIRATION RATE: 22 BRPM | HEIGHT: 64 IN

## 2018-03-23 DIAGNOSIS — F33.1 MODERATE EPISODE OF RECURRENT MAJOR DEPRESSIVE DISORDER (H): ICD-10-CM

## 2018-03-23 DIAGNOSIS — G35 MULTIPLE SCLEROSIS (H): Chronic | ICD-10-CM

## 2018-03-23 DIAGNOSIS — E66.01 MORBID OBESITY (H): Primary | Chronic | ICD-10-CM

## 2018-03-23 DIAGNOSIS — I10 BENIGN ESSENTIAL HYPERTENSION: Chronic | ICD-10-CM

## 2018-03-23 DIAGNOSIS — F41.9 ANXIETY: ICD-10-CM

## 2018-03-23 DIAGNOSIS — G56.01 CARPAL TUNNEL SYNDROME OF RIGHT WRIST: ICD-10-CM

## 2018-03-23 DIAGNOSIS — F50.9 EATING DISORDER: Chronic | ICD-10-CM

## 2018-03-23 PROCEDURE — 99214 OFFICE O/P EST MOD 30 MIN: CPT | Performed by: FAMILY MEDICINE

## 2018-03-23 ASSESSMENT — ANXIETY QUESTIONNAIRES
7. FEELING AFRAID AS IF SOMETHING AWFUL MIGHT HAPPEN: NOT AT ALL
1. FEELING NERVOUS, ANXIOUS, OR ON EDGE: NOT AT ALL
IF YOU CHECKED OFF ANY PROBLEMS ON THIS QUESTIONNAIRE, HOW DIFFICULT HAVE THESE PROBLEMS MADE IT FOR YOU TO DO YOUR WORK, TAKE CARE OF THINGS AT HOME, OR GET ALONG WITH OTHER PEOPLE: NOT DIFFICULT AT ALL
5. BEING SO RESTLESS THAT IT IS HARD TO SIT STILL: NOT AT ALL
3. WORRYING TOO MUCH ABOUT DIFFERENT THINGS: NOT AT ALL
6. BECOMING EASILY ANNOYED OR IRRITABLE: SEVERAL DAYS
GAD7 TOTAL SCORE: 2
2. NOT BEING ABLE TO STOP OR CONTROL WORRYING: SEVERAL DAYS

## 2018-03-23 ASSESSMENT — PAIN SCALES - GENERAL: PAINLEVEL: SEVERE PAIN (6)

## 2018-03-23 ASSESSMENT — PATIENT HEALTH QUESTIONNAIRE - PHQ9: 5. POOR APPETITE OR OVEREATING: NOT AT ALL

## 2018-03-23 NOTE — PATIENT INSTRUCTIONS
[facility] will call you to schedule    Check BP again   If above goal <140/<90  -schedule a free nurse BP recheck in a few weeks, if still high schedule clinic visit to discuss starting BP medication    Right hand is likely carpal tunnel  To treat   -Weight loss  -wrist splint at night and during the day when using the wrists more, if not using the wrists then you don't need to wear them.

## 2018-03-23 NOTE — MR AVS SNAPSHOT
After Visit Summary   3/23/2018    Bess Enamorado    MRN: [id number]           Patient Information     Date Of Birth          1975        Visit Information        Provider Department      3/23/2018 7:00 AM Elton Epstein MD [facility]        Care Instructions    [facility] will call you to schedule    Check BP again   If above goal <140/<90  -schedule a free nurse BP recheck in a few weeks, if still high schedule clinic visit to discuss starting BP medication    Right hand is likely carpal tunnel  To treat   -Weight loss  -wrist splint at night and during the day when using the wrists more, if not using the wrists then you don't need to wear them.            Follow-ups after your visit        Who to contact     If you have questions or need follow up information about today's clinic visit or your schedule please contact [facility] directly at [phone].  Normal or non-critical lab and imaging results will be communicated to you by MyChart, letter or phone within 4 business days after the clinic has received the results. If you do not hear from us within 7 days, please contact the clinic through [facility] or phone. If you have a critical or abnormal lab result, we will notify you by phone as soon as possible.  Submit refill requests through [employer] or call your pharmacy and they will forward the refill request to us. Please allow 3 business days for your refill to be completed.          Additional Information About Your Visit        MyChart Information     [employer] gives you secure access to your electronic health record. If you see a primary care provider, you can also send messages to your care team and make appointments. If you have questions, please call your primary care clinic.  If you do not have a primary care provider, please call [phone] and they will assist you.        Care EveryWhere ID     This is your Care EveryWhere ID. This could be used by  "other organizations to access your [place] medical records  [id number]        Your Vitals Were     Pulse Respirations Height Last Period Pulse Oximetry BMI (Body Mass Index)    108 22 5' 4.1\" (1.628 m) 02/01/2018 (Approximate) 95% 63.48 kg/m2       Blood Pressure from Last 3 Encounters:   03/23/18 (!) 149/96   02/26/18 (!) 146/95   02/24/18 144/73    Weight from Last 3 Encounters:   03/23/18 (!) 371 lb (168.3 kg)   02/26/18 (!) 360 lb (163.3 kg)   02/24/18 (!) 360 lb (163.3 kg)              Today, you had the following     No orders found for display       Primary Care Provider    Jennifer Crowe MD       No address on file        Equal Access to Services     [facility]: Hadii aad ku hadasho Socarlos, waaxda luqadaha, qaybta kaalmada adeegyairina, gerry sarkar . So [facility] [phone].    ATENCIÓN: Si habla español, tiene a camacho disposición servicios gratuitos de asistencia lingüística. Arnulfo al [phone].    We comply with applicable federal civil rights laws and [place] laws. We do not discriminate on the basis of race, color, national origin, age, disability, sex, sexual orientation, or gender identity.            Thank you!     Thank you for choosing [facility]  for your care. Our goal is always to provide you with excellent care. Hearing back from our patients is one way we can continue to improve our services. Please take a few minutes to complete the written survey that you may receive in the mail after your visit with us. Thank you!             Your Updated Medication List - Protect others around you: Learn how to safely use, store and throw away your medicines at www.disposemymeds.org.          This list is accurate as of 3/23/18  7:44 AM.  Always use your most recent med list.                   Brand Name Dispense Instructions for use Diagnosis    desogestrel-ethinyl estradiol 0.15-30 MG-MCG per tablet    APRI    112 tablet    Take 1 tablet by mouth daily " Take as continuous daily dosing (omit placebo pills until after three months of hormone tablets.)    Polycystic ovaries, Encounter for surveillance of contraceptive pills        MG tablet   Generic drug:  ibuprofen      Take by mouth 3 times daily        metFORMIN 500 MG 24 hr tablet    GLUCOPHAGE-XR    180 tablet    Take 2 tablets (1,000 mg) by mouth daily (with dinner)    PCOS (polycystic ovarian syndrome)       OCREVUS IV           order for DME      Equipment ordered: RESMED CPAP Mask type: Nasal Settings: 10 CM H2O        PARoxetine 30 MG tablet    PAXIL    90 tablet    Take 1 tablet (30 mg) by mouth every morning    Anxiety       pramipexole 0.5 MG tablet    MIRAPEX     TAKE 2 TABLETS BY MOUTH NIGHTLY AT BEDTIME AS NEEDED FOR SPASMS.        VITAMIN D3 PO      Take 2,000 Units by mouth daily

## 2018-03-23 NOTE — NURSING NOTE
"Chief Complaint   Patient presents with     Establish Care     Weight Problem       Initial BP (!) 138/91 (BP Location: Right arm, Patient Position: Chair, Cuff Size: Adult Large)  Pulse 108  Resp 22  Ht 5' 4.1\" (1.628 m)  Wt (!) 371 lb (168.3 kg)  LMP 02/01/2018 (Approximate)  SpO2 95%  BMI 63.48 kg/m2 Estimated body mass index is 63.48 kg/(m^2) as calculated from the following:    Height as of this encounter: 5' 4.1\" (1.628 m).    Weight as of this encounter: 371 lb (168.3 kg).  Medication Reconciliation: complete   Stormy Perez CMA    "

## 2018-03-23 NOTE — PROGRESS NOTES
SUBJECTIVE:   Bess Enamorado is a 42 year old female who presents to clinic today for the following health issues:    Chief Complaint   Patient presents with     [facility] Care     Weight Problem     Concern - Weight Gain     Description:   Patient would like to discuss weight loss options.     Intensity: severe    Progression of Symptoms:  Worsening  Wt Readings from Last 4 Encounters:   03/23/18 (!) 371 lb (168.3 kg)   02/26/18 (!) 360 lb (163.3 kg)   02/24/18 (!) 360 lb (163.3 kg)   08/22/17 (!) 320 lb (145.2 kg)       Accompanying Signs & Symptoms:  Depression and eating disorder. Seeing therapist in [place].    Started [employer] 1.5 years ago and did well then broke foot and gained over spring.  Started medifast in the summer but mostly didn't eat and just slept so lost weight but then started school again as a teacher and started overeating due to low mood again.    Did the informational meeting for weight loss and was told had eating disorder and did [facility] but both nutritionist and therapist left so patient stopped going.    Depression & Anxiety  Followup    Status since last visit: Worsened depression; improved anxiety    Has been taking Paxil 30mg daily for many years.    See PHQ-9 for current symptoms.  Other associated symptoms: None    Complicating factors:   Significant life event:  No   Current substance abuse:  None  Anxiety or Panic symptoms:  No    PHQ-9 6/26/2017 2/26/2018 3/23/2018   Total Score 4 5 9   Q9: Suicide Ideation Not at all Not at all Not at all     In the past two weeks have you had thoughts of suicide or self-harm?  No.    Do you have concerns about your personal safety or the safety of others?   No  PHQ-9  English  PHQ-9   Any Language  Suicide Assessment Five-step Evaluation and Treatment (SAFE-T)      Right hand numb feeling in the second, third, and fourth fingers.     Problem list and histories reviewed & adjusted, as indicated.  Additional history: as  "documented    Tips of fingers on right hand are numb    BP Readings from Last 3 Encounters:   03/23/18 (!) 138/91   02/26/18 (!) 146/95   02/24/18 144/73    Wt Readings from Last 3 Encounters:   03/23/18 (!) 371 lb (168.3 kg)   02/26/18 (!) 360 lb (163.3 kg)   02/24/18 (!) 360 lb (163.3 kg)              Reviewed and updated as needed this visit by clinical staff  Tobacco  Allergies  Meds  Problems  Med Hx  Surg Hx  Fam Hx  Soc Hx        Reviewed and updated as needed this visit by Provider  Allergies  Meds  Problems         ROS:  Constitutional, HEENT, cardiovascular, pulmonary, gi and gu systems are negative, except as otherwise noted.    OBJECTIVE:     BP (!) 138/91 (BP Location: Right arm, Patient Position: Chair, Cuff Size: Adult Large)  Pulse 108  Resp 22  Ht 5' 4.1\" (1.628 m)  Wt (!) 371 lb (168.3 kg)  LMP 02/01/2018 (Approximate)  SpO2 95%  BMI 63.48 kg/m2  Body mass index is 63.48 kg/(m^2).  GENERAL APPEARANCE: healthy, alert and no distress  MS: extremities normal- no gross deformities noted  ORTHO: Wrist Exam: WRIST:  Inspection: no swelling  Palpation: Tender: none  Non-tender:  Range of Motion: normal  Strength: no deficits  Special tests: positive Tinel's at carpal tunnel.      ELBOW:  elbow exam not done    Diagnostic Test Results:  none     ASSESSMENT/PLAN:         Bess was seen today for establish care and weight problem.    Diagnoses and all orders for this visit:    Morbid obesity (H): increasing lately  -I put in referal to [facility] again for nutrition and counselor    Multiple sclerosis (H): stable, following with neurology    Eating disorder: as above    Benign essential hypertension: elevated again  -plan RN BP recheck in 2 weeks and if above goal plan clinic visit again to start BP medication.    Moderate episode of recurrent major depressive disorder (H): slightly worsening   on paxil patient does not want to increase at this point    Anxiety: stable on paxil    Carpal " tunnel syndrome of right wrist: discussed splint for night time        Patient Instructions   [facility] will call you to schedule    Check BP again   If above goal <140/<90  -schedule a free nurse BP recheck in a few weeks, if still high schedule clinic visit to discuss starting BP medication    Right hand is likely carpal tunnel  To treat   -Weight loss  -wrist splint at night and during the day when using the wrists more, if not using the wrists then you don't need to wear them.        Elton Epstein MD  [facility]

## 2018-03-24 ASSESSMENT — PATIENT HEALTH QUESTIONNAIRE - PHQ9: SUM OF ALL RESPONSES TO PHQ QUESTIONS 1-9: 9

## 2018-03-24 ASSESSMENT — ANXIETY QUESTIONNAIRES: GAD7 TOTAL SCORE: 2

## 2018-04-05 ENCOUNTER — TELEPHONE (OUTPATIENT)
Dept: FAMILY MEDICINE | Facility: CLINIC | Age: 43
End: 2018-04-05

## 2018-05-30 ENCOUNTER — TELEPHONE (OUTPATIENT)
Dept: DERMATOLOGY | Facility: CLINIC | Age: 43
End: 2018-05-30

## 2018-05-31 NOTE — TELEPHONE ENCOUNTER
Patient has not yet read My chart message I sent, so message left that My chart message was sent and can reply to My chart message or call us back. We can certainly see her in a bed in our hospital if she feels that is necessary for Provider to adequately exam and treat her, just let us know and we can add that to her appt notes. Diane Escalante RN

## 2018-05-31 NOTE — TELEPHONE ENCOUNTER
"Patient is scheduled for 07/03/18. Please see patient's side note that she listed on her web request:    \"I have not been back for this issue for years. I have gained a significant amount of weight and have not idea what it looks like down there. I will need a big bench/chair for examination. Might need more than 2 people to help with examination since I also have MS.\"      Liv FRENCH  Central Scheduler      "

## 2018-06-04 NOTE — TELEPHONE ENCOUNTER
"Spoke to patient who stated: \"I am just afraid they won't be able to see the area well as I am a bigger person- I have gained a lot of weight and I weigh between 370 and 400 lbs right now, so I am not sure it will work in the clinic?.. I also have Multiple sclerosis\"    I did ask if she is ambulatory and feels she can transfer to a hospital bed by herself? She did feel she could but would prefer the security of side rails in a hospital bed, so genital area can be fully assessed and treated.     I did add to appt notes- to use hospital bed for exam.     Diane Escalante RN    "

## 2018-07-03 ENCOUNTER — OFFICE VISIT (OUTPATIENT)
Dept: DERMATOLOGY | Facility: CLINIC | Age: 43
End: 2018-07-03
Payer: COMMERCIAL

## 2018-07-03 VITALS — HEART RATE: 98 BPM | SYSTOLIC BLOOD PRESSURE: 150 MMHG | DIASTOLIC BLOOD PRESSURE: 93 MMHG | OXYGEN SATURATION: 95 %

## 2018-07-03 DIAGNOSIS — A63.0 GENITAL WARTS: Primary | ICD-10-CM

## 2018-07-03 PROCEDURE — 17110 DESTRUCTION B9 LES UP TO 14: CPT | Performed by: PHYSICIAN ASSISTANT

## 2018-07-03 NOTE — LETTER
7/3/2018         RE: Bess Enamorado  [address] [facility] [address]        Dear Colleague,    Thank you for referring your patient, Bess Enamorado, to the [facility]. Please see a copy of my visit note below.    Bess Enamorado is a 42 year old year old female patient here today for genital warts.  Patient was last seen in 2015 for genital warts. She reports she is unsure if she has new spots but reports that she has not be able to check that area. She reports that she is currently in the [employer] program for over eating.  Patient has no other skin complaints today.  Remainder of the HPI, Meds, PMH, Allergies, FH, and SH was reviewed in chart.    Past Medical History:   Diagnosis Date     ASCUS favor benign 8/2015    Neg HPV     H/O colposcopy with cervical biopsy 9/14/15    Bx & ECC - negative     LSIL on Pap smear 7/2014    Neg high risk HPV     Multiple sclerosis (H) 8/29/2005 9/18/200pt dx with MS 2004--dx by neurolgist and is followed by Dr. Steven Ayala 10/2016     UNSPEC CONSTIPATION 9/18/2006 9/18/2006   Has tried otc suppository and otc lax.        Past Surgical History:   Procedure Laterality Date     CHOLECYSTECTOMY, LAPOROSCOPIC      Cholecystectomy, Laparoscopic     OPEN REDUCTION INTERNAL FIXATION TOE(S)  4/13/2012    Procedure:OPEN REDUCTION INTERNAL FIXATION TOE(S); Open reduction internal fixation right proximal fifth metatarsal fracture-   Anes-choice block; Surgeon:LEY, JEFFREY DUANE; Location:[place] OR        Family History   Problem Relation Age of Onset     Neurologic Disorder Father      MS     HEART DISEASE Father      irregular heart rate     Diabetes Father      Melanoma Father      Sleep Apnea Father      Cancer Maternal Grandfather      lung     Cancer Paternal Grandmother      stomach     Cancer Mother      Gynecology Maternal Aunt      PCOS       Social History     Social History     Marital status: Single     Spouse name: N/A     Number of children: N/A      Years of education: N/A     Occupational History      [employer]     Social History Main Topics     Smoking status: Never Smoker     Smokeless tobacco: Never Used     Alcohol use Yes      Comment: social     Drug use: No     Sexual activity: Not Currently     Partners: Male     Birth control/ protection: Pill     Other Topics Concern     Parent/Sibling W/ Cabg, Mi Or Angioplasty Before 65f 55m? No     Social History Narrative    , 3rd-5th grade       Outpatient Encounter Prescriptions as of 7/3/2018   Medication Sig Dispense Refill     Cholecalciferol (VITAMIN D3 PO) Take 2,000 Units by mouth daily       desogestrel-ethinyl estradiol (APRI) 0.15-30 MG-MCG per tablet Take 1 tablet by mouth daily Take as continuous daily dosing (omit placebo pills until after three months of hormone tablets.) 112 tablet 3     Ocrelizumab (OCREVUS IV)        order for DME Equipment ordered: RESMED CPAP Mask type: Nasal Settings: 10 CM H2O       PARoxetine (PAXIL) 30 MG tablet Take 1 tablet (30 mg) by mouth every morning 90 tablet 3     ibuprofen (IBU) 600 MG tablet Take by mouth 3 times daily       [DISCONTINUED] metFORMIN (GLUCOPHAGE-XR) 500 MG 24 hr tablet Take 2 tablets (1,000 mg) by mouth daily (with dinner) (Patient not taking: Reported on 2/26/2018) 180 tablet 1     [DISCONTINUED] pramipexole (MIRAPEX) 0.5 MG tablet TAKE 2 TABLETS BY MOUTH NIGHTLY AT BEDTIME AS NEEDED FOR SPASMS.  2     No facility-administered encounter medications on file as of 7/3/2018.              Review Of Systems  Skin: As above  Eyes: negative  Ears/Nose/Throat: negative  Respiratory: No shortness of breath, dyspnea on exertion, cough, or hemoptysis  Cardiovascular: negative  Gastrointestinal: negative  Genitourinary: negative  Musculoskeletal: negative  Neurologic: negative  Psychiatric: negative  Hematologic/Lymphatic/Immunologic: negative  Endocrine: negative      O:   NAD, WDWN, Alert & Oriented, Mood & Affect wnl, Vitals  stable   Here today alone   BP (!) 150/93  Pulse 98  SpO2 95%   General appearance normal   Vitals stable   Alert, oriented and in no acute distress      3 verrucous papule on thighs    6 verrucous papules perianal     Eyes: Conjunctivae/lids:Normal     ENT: Lips: normal    MSK:Normal    Pulm: Breathing Normal    Neuro/Psych: Orientation:Normal; Mood/Affect:Normal  A/P:  1. Genital warts x 9   LN2:  Treated with LN2 for 5s for 1-2 cycles. Warned risks of blistering, pain, pigment change, scarring, and incomplete resolution.  Advised patient to return if lesions do not completely resolve.  Wound care sheet given.  Recheck in one month.   Watch spot on left shin, patient reports she noticed after an injury to leg.     Again, thank you for allowing me to participate in the care of your patient.        Sincerely,        Rose Mary Pedraza PA-C

## 2018-07-03 NOTE — NURSING NOTE
"Initial BP (!) 150/93  Pulse 98  SpO2 95% Estimated body mass index is 63.48 kg/(m^2) as calculated from the following:    Height as of 3/23/18: 1.628 m (5' 4.1\").    Weight as of 3/23/18: 168.3 kg (371 lb). .    Barbara Laughlin LPN    "

## 2018-07-03 NOTE — PROGRESS NOTES
Bess Enamorado is a 42 year old year old female patient here today for genital warts.  Patient was last seen in 2015 for genital warts. She reports she is unsure if she has new spots but reports that she has not be able to check that area. She reports that she is currently in the [employer] program for over eating.  Patient has no other skin complaints today.  Remainder of the HPI, Meds, PMH, Allergies, FH, and SH was reviewed in chart.    Past Medical History:   Diagnosis Date     ASCUS favor benign 8/2015    Neg HPV     H/O colposcopy with cervical biopsy 9/14/15    Bx & ECC - negative     LSIL on Pap smear 7/2014    Neg high risk HPV     Multiple sclerosis (H) 8/29/2005 9/18/200pt dx with MS 2004--dx by neurolgist and is followed by Dr. Steven Ayala 10/2016     UNSPEC CONSTIPATION 9/18/2006 9/18/2006   Has tried otc suppository and otc lax.        Past Surgical History:   Procedure Laterality Date     CHOLECYSTECTOMY, LAPOROSCOPIC      Cholecystectomy, Laparoscopic     OPEN REDUCTION INTERNAL FIXATION TOE(S)  4/13/2012    Procedure:OPEN REDUCTION INTERNAL FIXATION TOE(S); Open reduction internal fixation right proximal fifth metatarsal fracture-   Anes-choice block; Surgeon:LEY, JEFFREY DUANE; Location:[place] OR        Family History   Problem Relation Age of Onset     Neurologic Disorder Father      MS     HEART DISEASE Father      irregular heart rate     Diabetes Father      Melanoma Father      Sleep Apnea Father      Cancer Maternal Grandfather      lung     Cancer Paternal Grandmother      stomach     Cancer Mother      Gynecology Maternal Aunt      PCOS       Social History     Social History     Marital status: Single     Spouse name: N/A     Number of children: N/A     Years of education: N/A     Occupational History      [employer]     Social History Main Topics     Smoking status: Never Smoker     Smokeless tobacco: Never Used     Alcohol use Yes      Comment: social     Drug use: No     Sexual  activity: Not Currently     Partners: Male     Birth control/ protection: Pill     Other Topics Concern     Parent/Sibling W/ Cabg, Mi Or Angioplasty Before 65f 55m? No     Social History Narrative    , 3rd-5th grade       Outpatient Encounter Prescriptions as of 7/3/2018   Medication Sig Dispense Refill     Cholecalciferol (VITAMIN D3 PO) Take 2,000 Units by mouth daily       desogestrel-ethinyl estradiol (APRI) 0.15-30 MG-MCG per tablet Take 1 tablet by mouth daily Take as continuous daily dosing (omit placebo pills until after three months of hormone tablets.) 112 tablet 3     Ocrelizumab (OCREVUS IV)        order for DME Equipment ordered: RESMED CPAP Mask type: Nasal Settings: 10 CM H2O       PARoxetine (PAXIL) 30 MG tablet Take 1 tablet (30 mg) by mouth every morning 90 tablet 3     ibuprofen (IBU) 600 MG tablet Take by mouth 3 times daily       [DISCONTINUED] metFORMIN (GLUCOPHAGE-XR) 500 MG 24 hr tablet Take 2 tablets (1,000 mg) by mouth daily (with dinner) (Patient not taking: Reported on 2/26/2018) 180 tablet 1     [DISCONTINUED] pramipexole (MIRAPEX) 0.5 MG tablet TAKE 2 TABLETS BY MOUTH NIGHTLY AT BEDTIME AS NEEDED FOR SPASMS.  2     No facility-administered encounter medications on file as of 7/3/2018.              Review Of Systems  Skin: As above  Eyes: negative  Ears/Nose/Throat: negative  Respiratory: No shortness of breath, dyspnea on exertion, cough, or hemoptysis  Cardiovascular: negative  Gastrointestinal: negative  Genitourinary: negative  Musculoskeletal: negative  Neurologic: negative  Psychiatric: negative  Hematologic/Lymphatic/Immunologic: negative  Endocrine: negative      O:   NAD, WDWN, Alert & Oriented, Mood & Affect wnl, Vitals stable   Here today alone   BP (!) 150/93  Pulse 98  SpO2 95%   General appearance normal   Vitals stable   Alert, oriented and in no acute distress      3 verrucous papule on thighs    6 verrucous papules perianal     Eyes:  Conjunctivae/lids:Normal     ENT: Lips: normal    MSK:Normal    Pulm: Breathing Normal    Neuro/Psych: Orientation:Normal; Mood/Affect:Normal  A/P:  1. Genital warts x 9   LN2:  Treated with LN2 for 5s for 1-2 cycles. Warned risks of blistering, pain, pigment change, scarring, and incomplete resolution.  Advised patient to return if lesions do not completely resolve.  Wound care sheet given.  Recheck in one month.   Watch spot on left shin, patient reports she noticed after an injury to leg.

## 2018-07-03 NOTE — MR AVS SNAPSHOT
After Visit Summary   7/3/2018    Bess Enamorado    MRN: [id number]           Patient Information     Date Of Birth          1975        Visit Information        Provider Department      7/3/2018 8:00 AM Rose Mary Chowdhury PA-C [facility]        Care Instructions    WOUND CARE INSTRUCTIONS   FOR CRYOSURGERY   This area treated with liquid nitrogen will form a blister. You do not need to bandage the area until after the blister forms and breaks (which may be a few days). When the blister breaks, begin daily dressing changes as follows:   1) Clean and dry the area with tap water using clean Q-tip or sterile gauze pad.   2) Apply Polysporin ointment or Bacitracin ointment over entire wound. Do NOT use Neosporin ointment.   3) Cover the wound with a band-aid or sterile non-stick gauze pad and micropore paper tape.   REPEAT THESE INSTRUCTIONS AT LEAST ONCE A DAY UNTIL THE WOUND HAS COMPLETELY HEALED.   It is an old wives tale that a wound heals better when it is exposed to air and allowed to dry out. The wound will heal faster with a better cosmetic result if it is kept moist with ointment and covered with a bandage.   Do not let the wound dry out.   IMPORTANT INFORMATION ON REVERSE SIDE   Supplies Needed:   *Cotton tipped applicators (Q-tips)   *Polysporin ointment or Bacitracin ointment (NOT NEOSPORIN)   *Band-aids, or non stick gauze pads and micropore paper tape   PATIENT INFORMATION   During the healing process you will notice a number of changes. All wounds develop a small halo of redness surrounding the wound. This means healing is occurring. Severe itching with extensive redness usually indicates sensitivity to the ointment or bandage tape used to dress the wound. You should call our office if this develops.   Swelling and/or discoloration around your surgical site is common, particularly when performed around the eye.   All wounds normally drain. The larger the wound the more  drainage there will be. After 7-10 days, you will notice the wound beginning to shrink and new skin will begin to grow. The wound is healed when you can see skin has formed over the entire area. A healed wound has a healthy, shiny look to the surface and is red to dark pink in color to normalize. Wounds may take approximately 4-6 weeks to heal. Larger wounds may take 6-8 weeks. After the wound is healed you may discontinue dressing changes.   You may experience a sensation of tightness as your wound heals. This is normal and will gradually subside.   Your healed wound may be sensitive to temperature changes. This sensitivity improves with time, but if you re having a lot of discomfort, try to avoid temperature extremes.   Patients frequently experience itching after their wound appears to have healed because of the continue healing under the skin. Plain Vaseline will help relieve the itching.                 Follow-ups after your visit        Who to contact     If you have questions or need follow up information about today's clinic visit or your schedule please contact [facility] directly at [phone].  Normal or non-critical lab and imaging results will be communicated to you by LinQpayhart, letter or phone within 4 business days after the clinic has received the results. If you do not hear from us within 7 days, please contact the clinic through [employer] or phone. If you have a critical or abnormal lab result, we will notify you by phone as soon as possible.  Submit refill requests through [employer] or call your pharmacy and they will forward the refill request to us. Please allow 3 business days for your refill to be completed.          Additional Information About Your Visit        [employer] Information     [employer] gives you secure access to your electronic health record. If you see a primary care provider, you can also send messages to your care team and make appointments. If you have questions, please  call your primary care clinic.  If you do not have a primary care provider, please call [phone] and they will assist you.        Care EveryWhere ID     This is your Care EveryWhere ID. This could be used by other organizations to access your [place] medical records  [id number]        Your Vitals Were     Pulse Pulse Oximetry                98 95%           Blood Pressure from Last 3 Encounters:   07/03/18 (!) 150/93   03/23/18 (!) 138/91   02/26/18 (!) 146/95    Weight from Last 3 Encounters:   03/23/18 (!) 168.3 kg (371 lb)   02/26/18 (!) 163.3 kg (360 lb)   02/24/18 (!) 163.3 kg (360 lb)              Today, you had the following     No orders found for display       Primary Care Provider Office Phone # Fax #    Jennifer Danielle Crowe [phone] [address] [facility] [address]        Equal Access to Services     CARIE DUNCAN : Hadii aad ku hadasho Soomaali, waaxda luqadaha, qaybta kaalmada adeegyada, waxay idiin hayaan dicksoneg kharajackson sarkar . So [facility] [phone].    ATENCIÓN: Si habla español, tiene a camacho disposición servicios gratuitos de asistencia lingüística. Llame al [phone].    We comply with applicable federal civil rights laws and [place] laws. We do not discriminate on the basis of race, color, national origin, age, disability, sex, sexual orientation, or gender identity.            Thank you!     Thank you for choosing [facility]  for your care. Our goal is always to provide you with excellent care. Hearing back from our patients is one way we can continue to improve our services. Please take a few minutes to complete the written survey that you may receive in the mail after your visit with us. Thank you!             Your Updated Medication List - Protect others around you: Learn how to safely use, store and throw away your medicines at www.disposemymeds.org.          This list is accurate as of 7/3/18  8:38 AM.  Always use your most recent med  list.                   Brand Name Dispense Instructions for use Diagnosis    desogestrel-ethinyl estradiol 0.15-30 MG-MCG per tablet    APRI    112 tablet    Take 1 tablet by mouth daily Take as continuous daily dosing (omit placebo pills until after three months of hormone tablets.)    Polycystic ovaries, Encounter for surveillance of contraceptive pills        MG tablet   Generic drug:  ibuprofen      Take by mouth 3 times daily        OCREVUS IV           order for DME      Equipment ordered: RESMED CPAP Mask type: Nasal Settings: 10 CM H2O        PARoxetine 30 MG tablet    PAXIL    90 tablet    Take 1 tablet (30 mg) by mouth every morning    Anxiety       VITAMIN D3 PO      Take 2,000 Units by mouth daily

## 2018-08-13 ENCOUNTER — TRANSFERRED RECORDS (OUTPATIENT)
Dept: HEALTH INFORMATION MANAGEMENT | Facility: CLINIC | Age: 43
End: 2018-08-13

## 2018-08-14 ENCOUNTER — OFFICE VISIT (OUTPATIENT)
Dept: DERMATOLOGY | Facility: CLINIC | Age: 43
End: 2018-08-14
Payer: COMMERCIAL

## 2018-08-14 VITALS — SYSTOLIC BLOOD PRESSURE: 158 MMHG | HEART RATE: 108 BPM | OXYGEN SATURATION: 94 % | DIASTOLIC BLOOD PRESSURE: 91 MMHG

## 2018-08-14 DIAGNOSIS — A63.0 GENITAL WARTS: Primary | ICD-10-CM

## 2018-08-14 PROCEDURE — 17110 DESTRUCTION B9 LES UP TO 14: CPT | Performed by: PHYSICIAN ASSISTANT

## 2018-08-14 NOTE — MR AVS SNAPSHOT
After Visit Summary   8/14/2018    Bess Enamorado    MRN: [id number]           Patient Information     Date Of Birth          1975        Visit Information        Provider Department      8/14/2018 9:00 AM Rose Mary Chowdhury PA-C [facility]        Care Instructions    WOUND CARE INSTRUCTIONS   FOR CRYOSURGERY   This area treated with liquid nitrogen will form a blister. You do not need to bandage the area until after the blister forms and breaks (which may be a few days). When the blister breaks, begin daily dressing changes as follows:   1) Clean and dry the area with tap water using clean Q-tip or sterile gauze pad.   2) Apply Polysporin ointment or Bacitracin ointment over entire wound. Do NOT use Neosporin ointment.   3) Cover the wound with a band-aid or sterile non-stick gauze pad and micropore paper tape.   REPEAT THESE INSTRUCTIONS AT LEAST ONCE A DAY UNTIL THE WOUND HAS COMPLETELY HEALED.   It is an old wives tale that a wound heals better when it is exposed to air and allowed to dry out. The wound will heal faster with a better cosmetic result if it is kept moist with ointment and covered with a bandage.   Do not let the wound dry out.   IMPORTANT INFORMATION ON REVERSE SIDE   Supplies Needed:   *Cotton tipped applicators (Q-tips)   *Polysporin ointment or Bacitracin ointment (NOT NEOSPORIN)   *Band-aids, or non stick gauze pads and micropore paper tape   PATIENT INFORMATION   During the healing process you will notice a number of changes. All wounds develop a small halo of redness surrounding the wound. This means healing is occurring. Severe itching with extensive redness usually indicates sensitivity to the ointment or bandage tape used to dress the wound. You should call our office if this develops.   Swelling and/or discoloration around your surgical site is common, particularly when performed around the eye.   All wounds normally drain. The larger the wound the  more drainage there will be. After 7-10 days, you will notice the wound beginning to shrink and new skin will begin to grow. The wound is healed when you can see skin has formed over the entire area. A healed wound has a healthy, shiny look to the surface and is red to dark pink in color to normalize. Wounds may take approximately 4-6 weeks to heal. Larger wounds may take 6-8 weeks. After the wound is healed you may discontinue dressing changes.   You may experience a sensation of tightness as your wound heals. This is normal and will gradually subside.   Your healed wound may be sensitive to temperature changes. This sensitivity improves with time, but if you re having a lot of discomfort, try to avoid temperature extremes.   Patients frequently experience itching after their wound appears to have healed because of the continue healing under the skin. Plain Vaseline will help relieve the itching.                 Follow-ups after your visit        Who to contact     If you have questions or need follow up information about today's clinic visit or your schedule please contact [facility] directly at [phone].  Normal or non-critical lab and imaging results will be communicated to you by Fototwicshart, letter or phone within 4 business days after the clinic has received the results. If you do not hear from us within 7 days, please contact the clinic through [employer] or phone. If you have a critical or abnormal lab result, we will notify you by phone as soon as possible.  Submit refill requests through [employer] or call your pharmacy and they will forward the refill request to us. Please allow 3 business days for your refill to be completed.          Additional Information About Your Visit        [employer] Information     [employer] gives you secure access to your electronic health record. If you see a primary care provider, you can also send messages to your care team and make appointments. If you have questions,  please call your primary care clinic.  If you do not have a primary care provider, please call [phone] and they will assist you.        Care EveryWhere ID     This is your Care EveryWhere ID. This could be used by other organizations to access your [place] medical records  [id number]        Your Vitals Were     Pulse Pulse Oximetry                108 94%           Blood Pressure from Last 3 Encounters:   08/14/18 (!) 147/102   07/03/18 (!) 150/93   03/23/18 (!) 138/91    Weight from Last 3 Encounters:   03/23/18 (!) 168.3 kg (371 lb)   02/26/18 (!) 163.3 kg (360 lb)   02/24/18 (!) 163.3 kg (360 lb)              Today, you had the following     No orders found for display       Primary Care Provider Office Phone # Fax #    Jennifer Danielle Crowe [phone] [address] [facility] [address]        Equal Access to Services     CARIE DUNCAN : Hadii aad ku hadasho Soomaali, waaxda luqadaha, qaybta kaalmada adeegyada, waxay idiin hayaan adeeg kharajackson labilly . So [place] [phone].    ATENCIÓN: Si habla español, tiene a camacho disposición servicios gratuitos de asistencia lingüística. Llame al [phone].    We comply with applicable federal civil rights laws and [place] laws. We do not discriminate on the basis of race, color, national origin, age, disability, sex, sexual orientation, or gender identity.            Thank you!     Thank you for choosing [facility]  for your care. Our goal is always to provide you with excellent care. Hearing back from our patients is one way we can continue to improve our services. Please take a few minutes to complete the written survey that you may receive in the mail after your visit with us. Thank you!             Your Updated Medication List - Protect others around you: Learn how to safely use, store and throw away your medicines at www.disposemymeds.org.          This list is accurate as of 8/14/18  9:36 AM.  Always use your most  recent med list.                   Brand Name Dispense Instructions for use Diagnosis    desogestrel-ethinyl estradiol 0.15-30 MG-MCG per tablet    APRI    112 tablet    Take 1 tablet by mouth daily Take as continuous daily dosing (omit placebo pills until after three months of hormone tablets.)    Polycystic ovaries, Encounter for surveillance of contraceptive pills        MG tablet   Generic drug:  ibuprofen      Take by mouth 3 times daily        OCREVUS IV           order for DME      Equipment ordered: RESMED CPAP Mask type: Nasal Settings: 10 CM H2O        PARoxetine 30 MG tablet    PAXIL    90 tablet    Take 1 tablet (30 mg) by mouth every morning    Anxiety       VITAMIN D3 PO      Take 2,000 Units by mouth daily

## 2018-08-14 NOTE — PROGRESS NOTES
Bess Enamorado is a 42 year old year old female patient here today for recheck genital warts. She reports that she only developed a little discomfort from last treatment. She uses toilet paper to wipe after stools and does not use wipes. Patient has no other skin complaints today.  Remainder of the HPI, Meds, PMH, Allergies, FH, and SH was reviewed in chart.    Past Medical History:   Diagnosis Date     ASCUS favor benign 8/2015    Neg HPV     H/O colposcopy with cervical biopsy 9/14/15    Bx & ECC - negative     LSIL on Pap smear 7/2014    Neg high risk HPV     Multiple sclerosis (H) 8/29/2005 9/18/200pt dx with MS 2004--dx by neurolgist and is followed by Dr. Steven Ayala 10/2016     UNSPEC CONSTIPATION 9/18/2006 9/18/2006   Has tried otc suppository and otc lax.        Past Surgical History:   Procedure Laterality Date     CHOLECYSTECTOMY, LAPOROSCOPIC      Cholecystectomy, Laparoscopic     OPEN REDUCTION INTERNAL FIXATION TOE(S)  4/13/2012    Procedure:OPEN REDUCTION INTERNAL FIXATION TOE(S); Open reduction internal fixation right proximal fifth metatarsal fracture-   Anes-choice block; Surgeon:LEY, JEFFREY DUANE; Location:[place] OR        Family History   Problem Relation Age of Onset     Neurologic Disorder Father      MS     HEART DISEASE Father      irregular heart rate     Diabetes Father      Melanoma Father      Sleep Apnea Father      Cancer Maternal Grandfather      lung     Cancer Paternal Grandmother      stomach     Cancer Mother      Gynecology Maternal Aunt      PCOS       Social History     Social History     Marital status: Single     Spouse name: N/A     Number of children: N/A     Years of education: N/A     Occupational History      [employer]     Social History Main Topics     Smoking status: Never Smoker     Smokeless tobacco: Never Used     Alcohol use Yes      Comment: social     Drug use: No     Sexual activity: Not Currently     Partners: Male     Birth control/ protection:  Pill     Other Topics Concern     Parent/Sibling W/ Cabg, Mi Or Angioplasty Before 65f 55m? No     Social History Narrative    , 3rd-5th grade       Outpatient Encounter Prescriptions as of 8/14/2018   Medication Sig Dispense Refill     Cholecalciferol (VITAMIN D3 PO) Take 2,000 Units by mouth daily       desogestrel-ethinyl estradiol (APRI) 0.15-30 MG-MCG per tablet Take 1 tablet by mouth daily Take as continuous daily dosing (omit placebo pills until after three months of hormone tablets.) 112 tablet 3     ibuprofen (IBU) 600 MG tablet Take by mouth 3 times daily       Ocrelizumab (OCREVUS IV)        order for DME Equipment ordered: RESMED CPAP Mask type: Nasal Settings: 10 CM H2O       PARoxetine (PAXIL) 30 MG tablet Take 1 tablet (30 mg) by mouth every morning 90 tablet 3     No facility-administered encounter medications on file as of 8/14/2018.              Review Of Systems  Skin: As above  Eyes: negative  Ears/Nose/Throat: negative  Respiratory: No shortness of breath, dyspnea on exertion, cough, or hemoptysis  Cardiovascular: negative  Gastrointestinal: negative  Genitourinary: negative  Musculoskeletal: negative  Neurologic: negative  Psychiatric: negative  Hematologic/Lymphatic/Immunologic: negative  Endocrine: negative      O:   NAD, WDWN, Alert & Oriented, Mood & Affect wnl, Vitals stable   Here today alone   BP (!) 147/102  Pulse 108  SpO2 94%   General appearance normal   Vitals stable   Alert, oriented and in no acute distress     5 verrucous papule perianal      Eyes: Conjunctivae/lids:Normal     ENT: Lips: normal    MSK:Normal    Pulm: Breathing Normal    Neuro/Psych: Orientation:Normal; Mood/Affect:Normal  A/P:  1. Perianal genital warts x 5  Discussed trying unscented or sensitive skin wipes. She did have some stool on her skin, which could be causing skin breakdown and more warts.   LN2:  Treated with LN2 for 5s for 1-2 cycles. Warned risks of blistering, pain, pigment  change, scarring, and incomplete resolution.  Advised patient to return if lesions do not completely resolve.  Wound care sheet given.

## 2018-08-14 NOTE — LETTER
8/14/2018         RE: Bess Enamorado  [address]  [facility] [address]        Dear Colleague,    Thank you for referring your patient, Bess Enamorado, to the [facility]. Please see a copy of my visit note below.    Bess Enamorado is a 42 year old year old female patient here today for recheck genital warts. She reports that she only developed a little discomfort from last treatment. She uses toilet paper to wipe after stools and does not use wipes. Patient has no other skin complaints today.  Remainder of the HPI, Meds, PMH, Allergies, FH, and SH was reviewed in chart.    Past Medical History:   Diagnosis Date     ASCUS favor benign 8/2015    Neg HPV     H/O colposcopy with cervical biopsy 9/14/15    Bx & ECC - negative     LSIL on Pap smear 7/2014    Neg high risk HPV     Multiple sclerosis (H) 8/29/2005 9/18/200pt dx with MS 2004--dx by neurolgist and is followed by Dr. Steven Ayala 10/2016     UNSPEC CONSTIPATION 9/18/2006 9/18/2006   Has tried otc suppository and otc lax.        Past Surgical History:   Procedure Laterality Date     CHOLECYSTECTOMY, LAPOROSCOPIC      Cholecystectomy, Laparoscopic     OPEN REDUCTION INTERNAL FIXATION TOE(S)  4/13/2012    Procedure:OPEN REDUCTION INTERNAL FIXATION TOE(S); Open reduction internal fixation right proximal fifth metatarsal fracture-   Anes-choice block; Surgeon:LEY, JEFFREY DUANE; Location:[place] OR        Family History   Problem Relation Age of Onset     Neurologic Disorder Father      MS     HEART DISEASE Father      irregular heart rate     Diabetes Father      Melanoma Father      Sleep Apnea Father      Cancer Maternal Grandfather      lung     Cancer Paternal Grandmother      stomach     Cancer Mother      Gynecology Maternal Aunt      PCOS       Social History     Social History     Marital status: Single     Spouse name: N/A     Number of children: N/A     Years of education: N/A     Occupational History      [employer]      Social History Main Topics     Smoking status: Never Smoker     Smokeless tobacco: Never Used     Alcohol use Yes      Comment: social     Drug use: No     Sexual activity: Not Currently     Partners: Male     Birth control/ protection: Pill     Other Topics Concern     Parent/Sibling W/ Cabg, Mi Or Angioplasty Before 65f 55m? No     Social History Narrative    , 3rd-5th grade       Outpatient Encounter Prescriptions as of 8/14/2018   Medication Sig Dispense Refill     Cholecalciferol (VITAMIN D3 PO) Take 2,000 Units by mouth daily       desogestrel-ethinyl estradiol (APRI) 0.15-30 MG-MCG per tablet Take 1 tablet by mouth daily Take as continuous daily dosing (omit placebo pills until after three months of hormone tablets.) 112 tablet 3     ibuprofen (IBU) 600 MG tablet Take by mouth 3 times daily       Ocrelizumab (OCREVUS IV)        order for DME Equipment ordered: RESMED CPAP Mask type: Nasal Settings: 10 CM H2O       PARoxetine (PAXIL) 30 MG tablet Take 1 tablet (30 mg) by mouth every morning 90 tablet 3     No facility-administered encounter medications on file as of 8/14/2018.              Review Of Systems  Skin: As above  Eyes: negative  Ears/Nose/Throat: negative  Respiratory: No shortness of breath, dyspnea on exertion, cough, or hemoptysis  Cardiovascular: negative  Gastrointestinal: negative  Genitourinary: negative  Musculoskeletal: negative  Neurologic: negative  Psychiatric: negative  Hematologic/Lymphatic/Immunologic: negative  Endocrine: negative      O:   NAD, WDWN, Alert & Oriented, Mood & Affect wnl, Vitals stable   Here today alone   BP (!) 147/102  Pulse 108  SpO2 94%   General appearance normal   Vitals stable   Alert, oriented and in no acute distress     5 verrucous papule perianal      Eyes: Conjunctivae/lids:Normal     ENT: Lips: normal    MSK:Normal    Pulm: Breathing Normal    Neuro/Psych: Orientation:Normal; Mood/Affect:Normal  A/P:  1. Perianal genital  warts x 5  Discussed trying unscented or sensitive skin wipes. She did have some stool on her skin, which could be causing skin breakdown and more warts.   LN2:  Treated with LN2 for 5s for 1-2 cycles. Warned risks of blistering, pain, pigment change, scarring, and incomplete resolution.  Advised patient to return if lesions do not completely resolve.  Wound care sheet given.      Again, thank you for allowing me to participate in the care of your patient.        Sincerely,        Rose Mary Pedraza PA-C

## 2018-08-14 NOTE — NURSING NOTE
Chief Complaint   Patient presents with     Derm Problem     fu warts       Vitals:    08/14/18 0917   BP: (!) 147/102   Pulse: 108   SpO2: 94%     Wt Readings from Last 1 Encounters:   03/23/18 (!) 168.3 kg (371 lb)   Christin Johnson LPN.................8/14/2018

## 2018-08-16 ENCOUNTER — TRANSFERRED RECORDS (OUTPATIENT)
Dept: HEALTH INFORMATION MANAGEMENT | Facility: CLINIC | Age: 43
End: 2018-08-16

## 2018-09-13 DIAGNOSIS — E28.2 POLYCYSTIC OVARIES: ICD-10-CM

## 2018-09-13 DIAGNOSIS — Z30.41 ENCOUNTER FOR SURVEILLANCE OF CONTRACEPTIVE PILLS: ICD-10-CM

## 2018-09-13 RX ORDER — DESOGESTREL AND ETHINYL ESTRADIOL 0.15-0.03
1 KIT ORAL DAILY
Qty: 112 TABLET | Refills: 1 | Status: SHIPPED | OUTPATIENT
Start: 2018-09-13 | End: 2019-02-27

## 2018-09-13 NOTE — TELEPHONE ENCOUNTER
"Requested Prescriptions   Pending Prescriptions Disp Refills     desogestrel-ethinyl estradiol (APRI) 0.15-30 MG-MCG per tablet  Last Written Prescription Date:  6/26/2017  Last Fill Quantity: 112,  # refills: 3   Last office visit: 3/23/2018 with prescribing provider:  tyler   Future Office Visit:     112 tablet 3     Sig: Take 1 tablet by mouth daily Take as continuous daily dosing (omit placebo pills until after three months of hormone tablets.)    Contraceptives Protocol Passed    9/13/2018  2:08 PM       Passed - Patient is not a current smoker if age is 35 or older       Passed - Recent (12 mo) or future (30 days) visit within the authorizing provider's specialty    Patient had office visit in the last 12 months or has a visit in the next 30 days with authorizing provider or within the authorizing provider's specialty.  See \"Patient Info\" tab in inbasket, or \"Choose Columns\" in Meds & Orders section of the refill encounter.           Passed - No active pregnancy on record       Passed - No positive pregnancy test in past 12 months          "

## 2018-09-13 NOTE — TELEPHONE ENCOUNTER
Prescription approved per [facility] Refill Protocol.  Bridgett WILLIS RN

## 2018-09-19 DIAGNOSIS — R39.89 URINARY PROBLEM: ICD-10-CM

## 2018-09-19 DIAGNOSIS — G35 MS (MULTIPLE SCLEROSIS) (H): Primary | ICD-10-CM

## 2018-09-20 DIAGNOSIS — R39.89 URINARY PROBLEM: ICD-10-CM

## 2018-09-20 DIAGNOSIS — G35 MS (MULTIPLE SCLEROSIS) (H): ICD-10-CM

## 2018-09-21 DIAGNOSIS — R39.89 URINARY PROBLEM: ICD-10-CM

## 2018-09-21 DIAGNOSIS — G35 MS (MULTIPLE SCLEROSIS) (H): ICD-10-CM

## 2018-09-21 LAB
ALBUMIN UR-MCNC: NEGATIVE MG/DL
APPEARANCE UR: CLEAR
BILIRUB UR QL STRIP: NEGATIVE
COLOR UR AUTO: YELLOW
GLUCOSE UR STRIP-MCNC: NEGATIVE MG/DL
HGB UR QL STRIP: NEGATIVE
KETONES UR STRIP-MCNC: NEGATIVE MG/DL
LEUKOCYTE ESTERASE UR QL STRIP: NEGATIVE
NITRATE UR QL: NEGATIVE
PH UR STRIP: 7 PH (ref 5–7)
SOURCE: NORMAL
SP GR UR STRIP: 1.01 (ref 1–1.03)
UROBILINOGEN UR STRIP-ACNC: 0.2 EU/DL (ref 0.2–1)

## 2018-09-21 PROCEDURE — 87086 URINE CULTURE/COLONY COUNT: CPT | Performed by: PSYCHIATRY & NEUROLOGY

## 2018-09-21 PROCEDURE — 81003 URINALYSIS AUTO W/O SCOPE: CPT | Performed by: PSYCHIATRY & NEUROLOGY

## 2018-09-22 LAB
BACTERIA SPEC CULT: NORMAL
Lab: NORMAL
SPECIMEN SOURCE: NORMAL

## 2018-10-22 ENCOUNTER — APPOINTMENT (OUTPATIENT)
Dept: CT IMAGING | Facility: CLINIC | Age: 43
End: 2018-10-22
Attending: EMERGENCY MEDICINE
Payer: COMMERCIAL

## 2018-10-22 ENCOUNTER — HOSPITAL ENCOUNTER (INPATIENT)
Facility: CLINIC | Age: 43
LOS: 3 days | Discharge: HOME | End: 2018-10-25
Attending: EMERGENCY MEDICINE | Admitting: FAMILY MEDICINE
Payer: COMMERCIAL

## 2018-10-22 ENCOUNTER — VIRTUAL VISIT (OUTPATIENT)
Dept: FAMILY MEDICINE | Facility: OTHER | Age: 43
End: 2018-10-22

## 2018-10-22 ENCOUNTER — APPOINTMENT (OUTPATIENT)
Dept: GENERAL RADIOLOGY | Facility: CLINIC | Age: 43
End: 2018-10-22
Attending: EMERGENCY MEDICINE
Payer: COMMERCIAL

## 2018-10-22 DIAGNOSIS — E66.01 MORBID OBESITY (H): ICD-10-CM

## 2018-10-22 DIAGNOSIS — G35 MULTIPLE SCLEROSIS (H): ICD-10-CM

## 2018-10-22 DIAGNOSIS — I87.2 STASIS DERMATITIS OF BOTH LEGS: Primary | ICD-10-CM

## 2018-10-22 DIAGNOSIS — L03.311 CELLULITIS, ABDOMINAL WALL: ICD-10-CM

## 2018-10-22 DIAGNOSIS — L03.116 CELLULITIS OF LEFT LEG: ICD-10-CM

## 2018-10-22 DIAGNOSIS — R05.9 COUGH: ICD-10-CM

## 2018-10-22 DIAGNOSIS — M79.89 SWELLING OF LIMB: ICD-10-CM

## 2018-10-22 DIAGNOSIS — L03.311 CELLULITIS OF ABDOMINAL WALL: ICD-10-CM

## 2018-10-22 LAB
ALBUMIN SERPL-MCNC: 3.2 G/DL (ref 3.4–5)
ALBUMIN UR-MCNC: NEGATIVE MG/DL
ALP SERPL-CCNC: 62 U/L (ref 40–150)
ALT SERPL W P-5'-P-CCNC: 39 U/L (ref 0–50)
ANION GAP SERPL CALCULATED.3IONS-SCNC: 10 MMOL/L (ref 3–14)
APPEARANCE UR: ABNORMAL
AST SERPL W P-5'-P-CCNC: 24 U/L (ref 0–45)
BASOPHILS # BLD AUTO: 0.1 10E9/L (ref 0–0.2)
BASOPHILS NFR BLD AUTO: 0.2 %
BILIRUB SERPL-MCNC: 2.1 MG/DL (ref 0.2–1.3)
BILIRUB UR QL STRIP: NEGATIVE
BUN SERPL-MCNC: 13 MG/DL (ref 7–30)
CALCIUM SERPL-MCNC: 8.5 MG/DL (ref 8.5–10.1)
CHLORIDE SERPL-SCNC: 99 MMOL/L (ref 94–109)
CO2 SERPL-SCNC: 27 MMOL/L (ref 20–32)
COLOR UR AUTO: YELLOW
CREAT SERPL-MCNC: 0.82 MG/DL (ref 0.52–1.04)
DIFFERENTIAL METHOD BLD: ABNORMAL
EOSINOPHIL # BLD AUTO: 0 10E9/L (ref 0–0.7)
EOSINOPHIL NFR BLD AUTO: 0 %
ERYTHROCYTE [DISTWIDTH] IN BLOOD BY AUTOMATED COUNT: 14.3 % (ref 10–15)
GFR SERPL CREATININE-BSD FRML MDRD: 76 ML/MIN/1.7M2
GLUCOSE SERPL-MCNC: 136 MG/DL (ref 70–99)
GLUCOSE UR STRIP-MCNC: NEGATIVE MG/DL
HCT VFR BLD AUTO: 45 % (ref 35–47)
HGB BLD-MCNC: 14.2 G/DL (ref 11.7–15.7)
HGB UR QL STRIP: NEGATIVE
IMM GRANULOCYTES # BLD: 0.5 10E9/L (ref 0–0.4)
IMM GRANULOCYTES NFR BLD: 1.4 %
KETONES UR STRIP-MCNC: NEGATIVE MG/DL
LACTATE BLD-SCNC: 2.3 MMOL/L (ref 0.7–2)
LEUKOCYTE ESTERASE UR QL STRIP: NEGATIVE
LYMPHOCYTES # BLD AUTO: 0.3 10E9/L (ref 0.8–5.3)
LYMPHOCYTES NFR BLD AUTO: 0.9 %
MCH RBC QN AUTO: 27.3 PG (ref 26.5–33)
MCHC RBC AUTO-ENTMCNC: 31.6 G/DL (ref 31.5–36.5)
MCV RBC AUTO: 86 FL (ref 78–100)
MONOCYTES # BLD AUTO: 1.7 10E9/L (ref 0–1.3)
MONOCYTES NFR BLD AUTO: 4.8 %
NEUTROPHILS # BLD AUTO: 31.8 10E9/L (ref 1.6–8.3)
NEUTROPHILS NFR BLD AUTO: 92.7 %
NITRATE UR QL: NEGATIVE
NRBC # BLD AUTO: 0 10*3/UL
NRBC BLD AUTO-RTO: 0 /100
PH UR STRIP: 8 PH (ref 5–7)
PLATELET # BLD AUTO: 261 10E9/L (ref 150–450)
POTASSIUM SERPL-SCNC: 4.3 MMOL/L (ref 3.4–5.3)
PROT SERPL-MCNC: 6.7 G/DL (ref 6.8–8.8)
RBC # BLD AUTO: 5.21 10E12/L (ref 3.8–5.2)
RBC #/AREA URNS AUTO: <1 /HPF (ref 0–2)
SODIUM SERPL-SCNC: 136 MMOL/L (ref 133–144)
SOURCE: ABNORMAL
SP GR UR STRIP: 1.02 (ref 1–1.03)
SQUAMOUS #/AREA URNS AUTO: <1 /HPF (ref 0–1)
UROBILINOGEN UR STRIP-MCNC: 4 MG/DL (ref 0–2)
WBC # BLD AUTO: 34.3 10E9/L (ref 4–11)
WBC #/AREA URNS AUTO: 1 /HPF (ref 0–5)

## 2018-10-22 PROCEDURE — 74177 CT ABD & PELVIS W/CONTRAST: CPT

## 2018-10-22 PROCEDURE — 25000128 H RX IP 250 OP 636: Performed by: RADIOLOGY

## 2018-10-22 PROCEDURE — 83605 ASSAY OF LACTIC ACID: CPT | Performed by: EMERGENCY MEDICINE

## 2018-10-22 PROCEDURE — 87040 BLOOD CULTURE FOR BACTERIA: CPT | Performed by: EMERGENCY MEDICINE

## 2018-10-22 PROCEDURE — 99285 EMERGENCY DEPT VISIT HI MDM: CPT | Mod: Z6 | Performed by: EMERGENCY MEDICINE

## 2018-10-22 PROCEDURE — 36415 COLL VENOUS BLD VENIPUNCTURE: CPT | Performed by: EMERGENCY MEDICINE

## 2018-10-22 PROCEDURE — 25000125 ZZHC RX 250: Performed by: RADIOLOGY

## 2018-10-22 PROCEDURE — 99285 EMERGENCY DEPT VISIT HI MDM: CPT | Mod: 25 | Performed by: EMERGENCY MEDICINE

## 2018-10-22 PROCEDURE — 12000000 ZZH R&B MED SURG/OB

## 2018-10-22 PROCEDURE — 85025 COMPLETE CBC W/AUTO DIFF WBC: CPT | Performed by: EMERGENCY MEDICINE

## 2018-10-22 PROCEDURE — 25000132 ZZH RX MED GY IP 250 OP 250 PS 637: Performed by: EMERGENCY MEDICINE

## 2018-10-22 PROCEDURE — 96375 TX/PRO/DX INJ NEW DRUG ADDON: CPT | Performed by: EMERGENCY MEDICINE

## 2018-10-22 PROCEDURE — 81001 URINALYSIS AUTO W/SCOPE: CPT | Performed by: EMERGENCY MEDICINE

## 2018-10-22 PROCEDURE — 25000128 H RX IP 250 OP 636: Performed by: EMERGENCY MEDICINE

## 2018-10-22 PROCEDURE — 87086 URINE CULTURE/COLONY COUNT: CPT | Performed by: EMERGENCY MEDICINE

## 2018-10-22 PROCEDURE — 71046 X-RAY EXAM CHEST 2 VIEWS: CPT

## 2018-10-22 PROCEDURE — 80053 COMPREHEN METABOLIC PANEL: CPT | Performed by: EMERGENCY MEDICINE

## 2018-10-22 PROCEDURE — 96374 THER/PROPH/DIAG INJ IV PUSH: CPT | Mod: 59 | Performed by: EMERGENCY MEDICINE

## 2018-10-22 RX ORDER — IBUPROFEN 200 MG
600 TABLET ORAL EVERY 6 HOURS PRN
COMMUNITY
End: 2019-08-16

## 2018-10-22 RX ORDER — IOPAMIDOL 755 MG/ML
100 INJECTION, SOLUTION INTRAVASCULAR ONCE
Status: COMPLETED | OUTPATIENT
Start: 2018-10-22 | End: 2018-10-22

## 2018-10-22 RX ORDER — DESOGESTREL AND ETHINYL ESTRADIOL 0.15-0.03
1 KIT ORAL DAILY
Status: DISCONTINUED | OUTPATIENT
Start: 2018-10-23 | End: 2018-10-25 | Stop reason: HOSPADM

## 2018-10-22 RX ORDER — HYDROCODONE BITARTRATE AND ACETAMINOPHEN 5; 325 MG/1; MG/1
1-2 TABLET ORAL EVERY 4 HOURS PRN
Status: DISCONTINUED | OUTPATIENT
Start: 2018-10-22 | End: 2018-10-23

## 2018-10-22 RX ORDER — KETOROLAC TROMETHAMINE 30 MG/ML
30 INJECTION, SOLUTION INTRAMUSCULAR; INTRAVENOUS ONCE
Status: COMPLETED | OUTPATIENT
Start: 2018-10-22 | End: 2018-10-22

## 2018-10-22 RX ORDER — ONDANSETRON 2 MG/ML
4 INJECTION INTRAMUSCULAR; INTRAVENOUS EVERY 6 HOURS PRN
Status: DISCONTINUED | OUTPATIENT
Start: 2018-10-22 | End: 2018-10-23

## 2018-10-22 RX ORDER — NALOXONE HYDROCHLORIDE 0.4 MG/ML
.1-.4 INJECTION, SOLUTION INTRAMUSCULAR; INTRAVENOUS; SUBCUTANEOUS
Status: DISCONTINUED | OUTPATIENT
Start: 2018-10-22 | End: 2018-10-25 | Stop reason: HOSPADM

## 2018-10-22 RX ORDER — CEFAZOLIN SODIUM 2 G/100ML
2 INJECTION, SOLUTION INTRAVENOUS EVERY 6 HOURS
Status: DISCONTINUED | OUTPATIENT
Start: 2018-10-23 | End: 2018-10-22

## 2018-10-22 RX ORDER — SODIUM CHLORIDE, SODIUM LACTATE, POTASSIUM CHLORIDE, CALCIUM CHLORIDE 600; 310; 30; 20 MG/100ML; MG/100ML; MG/100ML; MG/100ML
1000 INJECTION, SOLUTION INTRAVENOUS CONTINUOUS
Status: DISCONTINUED | OUTPATIENT
Start: 2018-10-22 | End: 2018-10-25

## 2018-10-22 RX ORDER — CEFAZOLIN SODIUM 2 G/100ML
2 INJECTION, SOLUTION INTRAVENOUS ONCE
Status: COMPLETED | OUTPATIENT
Start: 2018-10-22 | End: 2018-10-22

## 2018-10-22 RX ORDER — ACETAMINOPHEN 500 MG
1000 TABLET ORAL ONCE
Status: COMPLETED | OUTPATIENT
Start: 2018-10-22 | End: 2018-10-22

## 2018-10-22 RX ORDER — ACETAMINOPHEN 325 MG/1
650 TABLET ORAL EVERY 6 HOURS PRN
Status: ON HOLD | COMMUNITY
End: 2022-05-05

## 2018-10-22 RX ORDER — CEFAZOLIN SODIUM 2 G/100ML
2 INJECTION, SOLUTION INTRAVENOUS EVERY 6 HOURS
Status: DISCONTINUED | OUTPATIENT
Start: 2018-10-23 | End: 2018-10-25 | Stop reason: HOSPADM

## 2018-10-22 RX ORDER — PAROXETINE 30 MG/1
30 TABLET, FILM COATED ORAL EVERY EVENING
Status: DISCONTINUED | OUTPATIENT
Start: 2018-10-23 | End: 2018-10-25 | Stop reason: HOSPADM

## 2018-10-22 RX ORDER — ONDANSETRON 4 MG/1
4 TABLET, ORALLY DISINTEGRATING ORAL EVERY 6 HOURS PRN
Status: DISCONTINUED | OUTPATIENT
Start: 2018-10-22 | End: 2018-10-23

## 2018-10-22 RX ADMIN — CEFAZOLIN SODIUM 2 G: 2 INJECTION, SOLUTION INTRAVENOUS at 19:34

## 2018-10-22 RX ADMIN — SODIUM CHLORIDE 74 ML: 9 INJECTION, SOLUTION INTRAVENOUS at 22:04

## 2018-10-22 RX ADMIN — KETOROLAC TROMETHAMINE 30 MG: 30 INJECTION, SOLUTION INTRAMUSCULAR at 21:36

## 2018-10-22 RX ADMIN — SODIUM CHLORIDE, POTASSIUM CHLORIDE, SODIUM LACTATE AND CALCIUM CHLORIDE 1000 ML: 600; 310; 30; 20 INJECTION, SOLUTION INTRAVENOUS at 21:36

## 2018-10-22 RX ADMIN — ACETAMINOPHEN 1000 MG: 500 TABLET ORAL at 19:33

## 2018-10-22 RX ADMIN — IOPAMIDOL 100 ML: 755 INJECTION, SOLUTION INTRAVENOUS at 22:04

## 2018-10-22 ASSESSMENT — ENCOUNTER SYMPTOMS
CHILLS: 1
COUGH: 1
FATIGUE: 1
FEVER: 1
SHORTNESS OF BREATH: 1

## 2018-10-22 NOTE — PROGRESS NOTES
"Date:   Clinician: Agapito Headley  Clinician NPI: [id number]  Patient: Bess Enamorado  Patient : 1975  Patient Address: [address] [place] [id number]  Patient Phone: [phone]  Visit Protocol: General skin conditions  Patient Summary:  Bess is a 42 year old ( : 1975 ) female who initiated a Visit for evaluation of an unspecified skin condition. When asked the question \"Please sign me up to receive news, health information and promotions from [employer].\", Bess responded \"No\".    Images of her skin condition were not required due to its location.  Her symptoms started 1-3 days ago and affect both sides of her body. The skin condition is located on her groin. The skin condition is yellow in color.   The affected area has crusts, drainage, and sores. It feels warm to touch and tender to touch. Bess also feels feverish but her temperature could not be measured.   Symptom details   Drainage: The color of the drainage is clear and yellow.   The skin condition has changed since the symptoms started. Description of changes as reported by the patient (free text): I have a fever   Denied symptoms include hives, dry/flaky skin, blisters, burning, pain, numbness, itchiness, scabs, and pimples. Bess She does not have a rash in the shape of a bull's-eye.   Treatments or home remedies used to relieve the symptoms as reported by the patient (free text): Powder from mom to keep dry. Wipe with personal wipes. Put cloth between sight to try stay dry   Precipitating events  Just before the symptoms started, Bess came in contact with new hair or skin care products and new medications.    Bess has not been in close contact with anyone that has similar symptoms. She also did not spend time in a wooded area, swim, travel, or spend excess time in the sun just before her symptoms started. Bess did not get bitten or stung by an insect.   Pertinent medical history  Bess has experienced this skin condition " before. Her current skin condition comes and goes. The last time she experienced this skin condition was within the last 3 months.   Bess has had chickenpox and has had shingles in the past.   She has ongoing medical conditions. Ongoing medical conditions as reported by the patient (free text): Multiple sclerosis    Bess does not have a history of atopia.   Weight: 370 lbs   Bess does not smoke or use smokeless tobacco.   She denies pregnancy and denies breastfeeding. She has menstruated in the past month.   Additional information as reported by the patient (free text): Eating disorder   MEDICATIONS: Ocrevus intravenous, ALLERGIES: NKDA  Clinician Response:  Dear Bess,   Your health is our priority. To determine the most appropriate care for you, I would like you to be seen in person to further discuss your health history and symptoms.  You will not be charged for this Visit. Thank you for trusting us with your care.   Diagnosis: Refer for additional evaluation  Diagnosis ICD: R69  Diagnosis ICD: 462.0

## 2018-10-22 NOTE — ED PROVIDER NOTES
History     Chief Complaint   Patient presents with     Cellulitis     MS patient with increased leg swelling and reddness     HPI  Bess Enamorado is a 42 year old female with significant past medical history for morbid obesity, obstructive sleep apnea, hypertension, multiple sclerosis, depression, anxiety, dependent edema-presents with fever 102 Fahrenheit, chills, sweats, dry cough, area of redness that is tender on the abdominal wall along with red swollen tender left lower extremity.  Symptoms present for <12 hours.  Currently rates leg pain only 2/10 intensity but she has diminished sensation due to her MS.  Abdominal pain on the abdominal wall is 5/10 intensity when touched.  Patient reports she has difficult hygiene because of her morbid obesity.  Has urine incontinence.  Has no access to bariatric depends that would fit for capture of incontinence.  Uncertain if she has any alexander-/rectal skin breakdown.  Patient is compliant with CPAP device.      Problem List:    Patient Active Problem List    Diagnosis Date Noted     Moderate episode of recurrent major depressive disorder (H) 03/23/2018     Priority: Medium     NARCISA (obstructive sleep apnea) 08/22/2017     Priority: Medium     Severe NARCISA with sleep-associated hypoxemia, with likely REM-related hypoventilation  Polysomnography - Test date 8/9/2017  AHI 45.9, basline SpO2 92.9%, mike SpO2 54.9%, time of SpO2 <= 88% of 31.7 minutes.  Severe desaturations and sustained hypoxemia confined to singular supine REM period (no lateral REM observed for comparison).  Also seen to have TCM increase by ~15 mmHg over baseline in supine REM, consistent with hypoventilation.  Pre-study VBG was WNL.  CPAP titrated to 10 cm H2O and appeared optimal, including supine REM, with normalization of SpO2 and TCM normalized to low-40's.  Seen to have frequent PLM's (64.3 / hour on diagnostic, 89.6 / hour on treatment, ~20% associated with cortical arousals).         Benign essential  hypertension 02/15/2016     Priority: Medium     Peroneal tendon rupture 11/23/2015     Priority: Medium     Morbid obesity (H) 08/24/2015     Priority: Medium     Oct 2016:  Starting [facility] program in [place], goal to lose 140 lbs over next 2 years       Papanicolaou smear of cervix with low grade squamous intraepithelial lesion (LGSIL) 08/03/2014     Priority: Medium     7/29/2014:Pap--LSIL. Neg high risk HPV. Plan cotest in 1 year (if this is ASCUS or LSIL then colp). In reminders  8/20/15: Pap - ASCUS, Neg HPV. Plan colp  9/14/15: [facility]x & ECC - negative. Plan cotest in 1 year.   10/20/16: NIL Pap, Neg HPV. Plan cotest in 3 years.        Eating disorder 08/25/2013     Priority: Medium     Binge-eating disorder; social phobia  See psychological assessment from [facility], Dr. Anupama Bowie, 8/14/2013  Problem list name updated by automated process. Provider to review       Leg edema 04/19/2013     Priority: Medium     Dyspnea and respiratory abnormality 04/19/2013     Priority: Medium     Problem list name updated by automated process. Provider to review       Anxiety 06/28/2011     Priority: Medium     Followed by neurologist       Restless legs 06/28/2011     Priority: Medium     CARDIOVASCULAR SCREENING; LDL GOAL LESS THAN 160 10/31/2010     Priority: Medium     Constipation 09/18/2006     Priority: Medium     9/18/2006    Has tried otc suppository and otc lax.   Problem list name updated by automated process. Provider to review       Multiple sclerosis (H) 08/29/2005     Priority: Medium     9/18/200pt dx with MS 2004--dx by neurolgist and is followed by Dr. Jeet Nicholson, also MS nurse Heather Thomas       Polycystic ovaries 08/29/2005     Priority: Medium     Diagnosed in [place], had facial hair, overweight, carb craving, records being requested, never on metformin          Past Medical History:    Past Medical History:   Diagnosis Date     ASCUS favor benign 8/2015     H/O colposcopy  with cervical biopsy 9/14/15     LSIL on Pap smear 7/2014     Multiple sclerosis (H) 8/29/2005     Shingles 10/2016     UNSPEC CONSTIPATION 9/18/2006       Past Surgical History:    Past Surgical History:   Procedure Laterality Date     CHOLECYSTECTOMY, LAPOROSCOPIC      Cholecystectomy, Laparoscopic     OPEN REDUCTION INTERNAL FIXATION TOE(S)  4/13/2012    Procedure:OPEN REDUCTION INTERNAL FIXATION TOE(S); Open reduction internal fixation right proximal fifth metatarsal fracture-   Anes-choice block; Surgeon:LEY, JEFFREY DUANE; Location:[place] OR       Family History:    Family History   Problem Relation Age of Onset     Neurologic Disorder Father      MS     HEART DISEASE Father      irregular heart rate     Diabetes Father      Melanoma Father      Sleep Apnea Father      Cancer Maternal Grandfather      lung     Cancer Paternal Grandmother      stomach     Cancer Mother      Gynecology Maternal Aunt      PCOS       Social History:  Marital Status:  Single [1]  Social History   Substance Use Topics     Smoking status: Never Smoker     Smokeless tobacco: Never Used     Alcohol use Yes      Comment: social        Medications:      Cholecalciferol (VITAMIN D3 PO)   desogestrel-ethinyl estradiol (APRI) 0.15-30 MG-MCG per tablet   ibuprofen (IBU) 600 MG tablet   Ocrelizumab (OCREVUS IV)   order for DME   PARoxetine (PAXIL) 30 MG tablet         Review of Systems   Constitutional: Positive for chills, fatigue (Patient was in to see her neurologist for her MS.  Due to progressive fatigue she did have a 3-day steroid burst treatment through IV approach 2 weeks ago.) and fever.   Respiratory: Positive for cough and shortness of breath.    Genitourinary: Positive for urgency.   Musculoskeletal: Positive for gait problem.   All other systems reviewed and are negative.      Physical Exam          Physical Exam   Constitutional: She is oriented to person, place, and time.   Morbidly obese   HENT:   Right Ear: External ear normal.    Left Ear: External ear normal.   Dry oral mucous membranes.  Poor oral hygiene.   Eyes: Conjunctivae are normal. Pupils are equal, round, and reactive to light. No scleral icterus.   Neck:   Range of motion of neck is greatly restricted due to morbid obesity   Cardiovascular: Normal rate.    No murmur heard.  Pulmonary/Chest: Effort normal.   Frequent cough noted.  Few rhonchi noted on auscultation but breath sounds are difficult to auscultate due to her morbid obesity.   Abdominal:   Abdomen is morbidly obese.  Very large pannus.  There is tissue induration and orange peel appearance to the lower pannus.  The lower abdominal wall from the suprapubic area to the umbilicus is erythematous warm and tender consistent with cellulitic process.   Musculoskeletal: She exhibits edema (4+ dependent edema.  There is a subtle lytic process involving the left lower extremity that goes from the dorsum of the foot up to the knee level.  Circumferential.  It is very warm.  There is induration.  This is cellulitis.  It is not due to stasis derm).   Neurological: She is alert and oriented to person, place, and time.   Skin: Skin is warm.   Psychiatric: She has a normal mood and affect.   Nursing note and vitals reviewed.      ED Course     ED Course     Procedures              Medications   ceFAZolin (ANCEF) intermittent infusion 2 g in 100 mL dextrose PRE-MIX (not administered)       Assessments & Plan (with Medical Decision Making)  6:47 PM  42-year-old obese female presents with fever, rapid development of abdominal wall erythema and left lower extremity erythema.  Examination of these areas is consistent with cellulitic process.  She was noted to have 4+ dependent edema of both lower extremities with stasis dermatitis affecting both legs but cellulitis only affecting the left lower extremity from the dorsum of the foot to the knee.  She was incontinent of urine with poor perirectal hygiene.  Abdominal wall shows a very large  pannus with induration and orange peel appearance of the skin along with cellulitic erythema across the whole lower abdomen from the pubic symphysis to the umbilicus.  She had an active cough with auscultation revealed a few rhonchi no crackles the auscultation was difficult because of her morbid obesity.  Patient needs to be sit upright at least 40 degrees otherwise she desaturates from her obesity.  Plan: Hospitalization.  IV Ancef.  Septic workup underway.  Will modify antibiotics accordingly.  8:54 PM  Lactate elevated.  2 L LR ordered.  Antibiotics been ordered when patient first arrived.  Labs reviewed.  Plan to discuss admission with on-call hospitalist.  9:05 PM  I discussed patient's care plan with Bebeto Wallace MD.  He requested a CT of the abdomen and pelvis to make sure this is not early necrotizing infection such as  Tiffany's.  Has accepted patient for admission pending the rule out of necrotizing infection.  If it is positive mostly patiently to be transferred to tertiary care center if negative will be admitted to the medical surgical floor on IV Ancef 2 g every 6 hours.  Cultures pending.     I have reviewed the nursing notes.    I have reviewed the findings, diagnosis, plan and need for follow up with the patient.      New Prescriptions    No medications on file       Final diagnoses:   Cellulitis of left leg   Cellulitis, abdominal wall   Morbid obesity (H)   Multiple sclerosis (H)       10/22/2018   [facility] EMERGENCY DEPARTMENT     Finesse Wilcox, DO  10/22/18 2106       Finesse Wilcox, DO  10/22/18 2109

## 2018-10-22 NOTE — IP AVS SNAPSHOT
MRN:[id number]                      After Visit Summary   10/22/2018    Bess Enamorado    MRN: [id number]           Thank you!     Thank you for choosing [facility] for your care. Our goal is always to provide you with excellent care. Hearing back from our patients is one way we can continue to improve our services. Please take a few minutes to complete the written survey that you may receive in the mail after you visit with us. Thank you!        Patient Information     Date Of Birth          1975        Designated Caregiver       Most Recent Value    Caregiver    Will someone help with your care after discharge? yes    Name of designated caregiver Brittney    Phone number of caregiver [phone]    Caregiver address [place]      About your hospital stay     You were admitted on:  October 22, 2018 You last received care in the:  [facility]    You were discharged on:  October 25, 2018       Who to Call     For medical emergencies, please call 911.  For non-urgent questions about your medical care, please call your primary care provider or clinic, [phone]          Attending Provider     Provider Specialty    Finesse Wilcox DO Emergency Medicine    [facility], Caleb ORONA MD [facility]    [facility]       Primary Care Provider Office Phone # Fax #    Jennifer Danielle Crowe [phone]      Your next 10 appointments already scheduled     Oct 31, 2018 11:00 AM CDT   [facility] with Elton Epstein MD   [facility]    [address] [address] [phone]           Bring a current list of meds and any records pertaining to this visit. For Physicals, please bring immunization records and any forms needing to be filled out. Please arrive 10 minutes early to complete paperwork.              Additional Services     LYMPHEDEMA THERAPY REFERRAL       If you have not  heard from the scheduling office within 2 business days, please call [phone] for all locations, with the exception of [place], please call [phone] and [place], please call [phone].    Please be aware that coverage of these services is subject to the terms and limitations of your health insurance plan.  Call member services at your health plan with any benefit or coverage questions.                  Further instructions from your care team       For the cellulitis  -keflex 500 mg 3 times/day for 3 more days  -would  follow up in clinic in the next week      For the lymphedema  - follow up with Lymphedema clinic   -triamcinolone cream 2 times/day     Pending Results     Date and Time Order Name Status Description    10/22/2018 1850 Blood culture Preliminary     10/22/2018 1850 Blood culture Preliminary             Statement of Approval     Ordered          10/25/18 0932  I have reviewed and agree with all the recommendations and orders detailed in this document.  EFFECTIVE NOW     Approved and electronically signed by:  Frank Wallace MD             Admission Information     Date & Time Provider Department Dept. Phone    10/22/2018 Frank Wallace MD [facility] [phone]      Your Vitals Were     Blood Pressure Pulse Temperature Respirations Weight Pulse Oximetry    139/79 (BP Location: Right arm) 96 97.8  F (36.6  C) (Oral) 16 180.5 kg (397 lb 14.9 oz) 96%    BMI (Body Mass Index)                   68.09 kg/m2           MyChart Information     [employer] gives you secure access to your electronic health record. If you see a primary care provider, you can also send messages to your care team and make appointments. If you have questions, please call your primary care clinic.  If you do not have a primary care provider, please call [phone] and they will assist you.        Care EveryWhere ID     This is your Care EveryWhere ID. This could be used by other organizations  to access your [facility] medical records  [id number]        Equal Access to Services     CARIE DUNCAN : Hadii aad ku hadarianafam Ramos, noemi tubbs, anilsandi hurtadoatulgerry snyderyuejackson grier. So [facility] [phone].    ATENCIÓN: Si habla español, tiene a camacho disposición servicios gratuitos de asistencia lingüística. Llame al [phone].    We comply with applicable federal civil rights laws and [place] laws. We do not discriminate on the basis of race, color, national origin, age, disability, sex, sexual orientation, or gender identity.               Review of your medicines      START taking        Dose / Directions    cephALEXin 500 MG capsule   Commonly known as:  KEFLEX   Used for:  Cellulitis of left leg        Dose:  500 mg   Take 1 capsule (500 mg) by mouth 3 times daily   Quantity:  10 capsule   Refills:  0       triamcinolone 0.5 % cream   Commonly known as:  KENALOG   Used for:  Stasis dermatitis of both legs        Apply topically 2 times daily   Quantity:  160 g   Refills:  1         CONTINUE these medicines which may have CHANGED, or have new prescriptions. If we are uncertain of the size of tablets/capsules you have at home, strength may be listed as something that might have changed.        Dose / Directions    PARoxetine 30 MG tablet   Commonly known as:  PAXIL   This may have changed:  when to take this   Used for:  Anxiety        Dose:  30 mg   Take 1 tablet (30 mg) by mouth every morning   Quantity:  90 tablet   Refills:  3         CONTINUE these medicines which have NOT CHANGED        Dose / Directions    acetaminophen 500 MG tablet   Commonly known as:  TYLENOL        Dose:  500-1000 mg   Take 500-1,000 mg by mouth once as needed for mild pain   Refills:  0       desogestrel-ethinyl estradiol 0.15-30 MG-MCG per tablet   Commonly known as:  APRI   Used for:  Polycystic ovaries, Encounter for surveillance of contraceptive pills        Dose:  1 tablet   Take 1 tablet  by mouth daily Take as continuous daily dosing (omit placebo pills until after three months of hormone tablets.)   Quantity:  112 tablet   Refills:  1       ibuprofen 200 MG tablet   Commonly known as:  ADVIL/MOTRIN        Dose:  600 mg   Take 600 mg by mouth every 6 hours as needed for mild pain   Refills:  0       OCREVUS IV        Refills:  0       order for DME        Equipment ordered: RESMED CPAP Mask type: Nasal Settings: 10 CM H2O   Refills:  0       VITAMIN D3 PO        Dose:  59937 Units   Take 10,000 Units by mouth daily   Refills:  0            Where to get your medicines      These medications were sent to [facility] [address] [place] [address] [address]     Phone:  [phone]     cephALEXin 500 MG capsule    triamcinolone 0.5 % cream                Protect others around you: Learn how to safely use, store and throw away your medicines at www.disposemymeds.org.        ANTIBIOTIC INSTRUCTION     You've Been Prescribed an Antibiotic - Now What?  Your healthcare team thinks that you or your loved one might have an infection. Some infections can be treated with antibiotics, which are powerful, life-saving drugs. Like all medications, antibiotics have side effects and should only be used when necessary. There are some important things you should know about your antibiotic treatment.      Your healthcare team may run tests before you start taking an antibiotic.    Your team may take samples (e.g., from your blood, urine or other areas) to run tests to look for bacteria. These test can be important to determine if you need an antibiotic at all and, if you do, which antibiotic will work best.      Within a few days, your healthcare team might change or even stop your antibiotic.    Your team may start you on an antibiotic while they are working to find out what is making you sick.    Your team might change your antibiotic because test results show that a  different antibiotic would be better to treat your infection.    In some cases, once your team has more information, they learn that you do not need an antibiotic at all. They may find out that you don't have an infection, or that the antibiotic you're taking won't work against your infection. For example, an infection caused by a virus can't be treated with antibiotics. Staying on an antibiotic when you don't need it is more likely to be harmful than helpful.      You may experience side effects from your antibiotic.    Like all medications, antibiotics have side effects. Some of these can be serious.    Let you healthcare team know if you have any known allergies when you are admitted to the hospital.    One significant side effect of nearly all antibiotics is the risk of severe and sometimes deadly diarrhea caused by Clostridium difficile (C. Difficile). This occurs when a person takes antibiotics because some good germs are destroyed. Antibiotic use allows C. diificile to take over, putting patients at high risk for this serious infection.    As a patient or caregiver, it is important to understand your or your loved one's antibiotic treatment. It is especially important for caregivers to speak up when patients can't speak for themselves. Here are some important questions to ask your healthcare team.    What infection is this antibiotic treating and how do you know I have that infection?    What side effects might occur from this antibiotic?    How long will I need to take this antibiotic?    Is it safe to take this antibiotic with other medications or supplements (e.g., vitamins) that I am taking?     Are there any special directions I need to know about taking this antibiotic? For example, should I take it with food?    How will I be monitored to know whether my infection is responding to the antibiotic?    What tests may help to make sure the right antibiotic is prescribed for me?      Information provided  by:  www.cdc.gov/getsmart  U.S. Department of Health and Human Services  Centers for disease Control and Prevention  National Center for Emerging and Zoonotic Infectious Diseases  Division of Healthcare Quality Promotion             Medication List: This is a list of all your medications and when to take them. Check marks below indicate your daily home schedule. Keep this list as a reference.      Medications           Morning Afternoon Evening Bedtime As Needed    acetaminophen 500 MG tablet   Commonly known as:  TYLENOL   Take 500-1,000 mg by mouth once as needed for mild pain   Last time this was given:  1,000 mg on 10/25/2018  8:31 AM                                   cephALEXin 500 MG capsule   Commonly known as:  KEFLEX   Take 1 capsule (500 mg) by mouth 3 times daily                                         desogestrel-ethinyl estradiol 0.15-30 MG-MCG per tablet   Commonly known as:  APRI   Take 1 tablet by mouth daily Take as continuous daily dosing (omit placebo pills until after three months of hormone tablets.)   Last time this was given:  1 tablet on 10/25/2018 10:19 AM                                   ibuprofen 200 MG tablet   Commonly known as:  ADVIL/MOTRIN   Take 600 mg by mouth every 6 hours as needed for mild pain                                   OCREVUS IV                                order for DME   Equipment ordered: RESMED CPAP Mask type: Nasal Settings: 10 CM H2O                                   PARoxetine 30 MG tablet   Commonly known as:  PAXIL   Take 1 tablet (30 mg) by mouth every morning   Last time this was given:  30 mg on 10/24/2018 10:17 PM                                   triamcinolone 0.5 % cream   Commonly known as:  KENALOG   Apply topically 2 times daily   Last time this was given:  10/25/2018  8:31 AM                                VITAMIN D3 PO   Take 10,000 Units by mouth daily

## 2018-10-22 NOTE — IP AVS SNAPSHOT
[facility]    [address] [address]    Phone:  [phone]    Fax:  [phone]                                       After Visit Summary   10/22/2018    Bess Enamorado    MRN: [id number]           After Visit Summary Signature Page     I have received my discharge instructions, and my questions have been answered. I have discussed any challenges I see with this plan with the nurse or doctor.    ..........................................................................................................................................  Patient/Patient Representative Signature      ..........................................................................................................................................  Patient Representative Print Name and Relationship to Patient    ..................................................               ................................................  Date                                   Time    ..........................................................................................................................................  Reviewed by Signature/Title    ...................................................              ..............................................  Date                                               Time          22EPIC Rev 08/18

## 2018-10-23 ENCOUNTER — APPOINTMENT (OUTPATIENT)
Dept: OCCUPATIONAL THERAPY | Facility: CLINIC | Age: 43
End: 2018-10-23
Payer: COMMERCIAL

## 2018-10-23 ENCOUNTER — APPOINTMENT (OUTPATIENT)
Dept: PHYSICAL THERAPY | Facility: CLINIC | Age: 43
End: 2018-10-23
Payer: COMMERCIAL

## 2018-10-23 LAB
BASOPHILS # BLD AUTO: 0 10E9/L (ref 0–0.2)
BASOPHILS NFR BLD AUTO: 0.2 %
CREAT SERPL-MCNC: 0.88 MG/DL (ref 0.52–1.04)
DIFFERENTIAL METHOD BLD: ABNORMAL
EOSINOPHIL # BLD AUTO: 0 10E9/L (ref 0–0.7)
EOSINOPHIL NFR BLD AUTO: 0.1 %
ERYTHROCYTE [DISTWIDTH] IN BLOOD BY AUTOMATED COUNT: 15 % (ref 10–15)
GFR SERPL CREATININE-BSD FRML MDRD: 70 ML/MIN/1.7M2
HCT VFR BLD AUTO: 40.8 % (ref 35–47)
HGB BLD-MCNC: 12.8 G/DL (ref 11.7–15.7)
IMM GRANULOCYTES # BLD: 0.1 10E9/L (ref 0–0.4)
IMM GRANULOCYTES NFR BLD: 0.7 %
LACTATE BLD-SCNC: 0.8 MMOL/L (ref 0.7–2)
LACTATE BLD-SCNC: 1.6 MMOL/L (ref 0.7–2)
LYMPHOCYTES # BLD AUTO: 0.3 10E9/L (ref 0.8–5.3)
LYMPHOCYTES NFR BLD AUTO: 1.5 %
MCH RBC QN AUTO: 27.6 PG (ref 26.5–33)
MCHC RBC AUTO-ENTMCNC: 31.4 G/DL (ref 31.5–36.5)
MCV RBC AUTO: 88 FL (ref 78–100)
MONOCYTES # BLD AUTO: 1.1 10E9/L (ref 0–1.3)
MONOCYTES NFR BLD AUTO: 5.9 %
MRSA DNA SPEC QL NAA+PROBE: NEGATIVE
NEUTROPHILS # BLD AUTO: 16.6 10E9/L (ref 1.6–8.3)
NEUTROPHILS NFR BLD AUTO: 91.6 %
NRBC # BLD AUTO: 0 10*3/UL
NRBC BLD AUTO-RTO: 0 /100
PLATELET # BLD AUTO: 193 10E9/L (ref 150–450)
PLATELET # BLD AUTO: 210 10E9/L (ref 150–450)
RBC # BLD AUTO: 4.63 10E12/L (ref 3.8–5.2)
SPECIMEN SOURCE: NORMAL
WBC # BLD AUTO: 18.2 10E9/L (ref 4–11)

## 2018-10-23 PROCEDURE — 40000918 ZZH STATISTIC PT IP PEDS VISIT: Performed by: PHYSICAL THERAPIST

## 2018-10-23 PROCEDURE — 97535 SELF CARE MNGMENT TRAINING: CPT | Mod: GO

## 2018-10-23 PROCEDURE — 85049 AUTOMATED PLATELET COUNT: CPT | Performed by: FAMILY MEDICINE

## 2018-10-23 PROCEDURE — 97116 GAIT TRAINING THERAPY: CPT | Mod: GP | Performed by: PHYSICAL THERAPIST

## 2018-10-23 PROCEDURE — 97161 PT EVAL LOW COMPLEX 20 MIN: CPT | Mod: GP | Performed by: PHYSICAL THERAPIST

## 2018-10-23 PROCEDURE — 36415 COLL VENOUS BLD VENIPUNCTURE: CPT | Performed by: EMERGENCY MEDICINE

## 2018-10-23 PROCEDURE — 97165 OT EVAL LOW COMPLEX 30 MIN: CPT | Mod: GO

## 2018-10-23 PROCEDURE — 82565 ASSAY OF CREATININE: CPT | Performed by: FAMILY MEDICINE

## 2018-10-23 PROCEDURE — 40000133 ZZH STATISTIC OT WARD VISIT

## 2018-10-23 PROCEDURE — 36415 COLL VENOUS BLD VENIPUNCTURE: CPT | Performed by: FAMILY MEDICINE

## 2018-10-23 PROCEDURE — 83605 ASSAY OF LACTIC ACID: CPT | Performed by: FAMILY MEDICINE

## 2018-10-23 PROCEDURE — 25000132 ZZH RX MED GY IP 250 OP 250 PS 637: Performed by: FAMILY MEDICINE

## 2018-10-23 PROCEDURE — 87641 MR-STAPH DNA AMP PROBE: CPT | Performed by: FAMILY MEDICINE

## 2018-10-23 PROCEDURE — 12000000 ZZH R&B MED SURG/OB

## 2018-10-23 PROCEDURE — 85025 COMPLETE CBC W/AUTO DIFF WBC: CPT | Performed by: EMERGENCY MEDICINE

## 2018-10-23 PROCEDURE — 25000128 H RX IP 250 OP 636: Performed by: EMERGENCY MEDICINE

## 2018-10-23 PROCEDURE — 99223 1ST HOSP IP/OBS HIGH 75: CPT | Mod: AI | Performed by: FAMILY MEDICINE

## 2018-10-23 PROCEDURE — 25000128 H RX IP 250 OP 636: Performed by: FAMILY MEDICINE

## 2018-10-23 PROCEDURE — 87640 STAPH A DNA AMP PROBE: CPT | Performed by: FAMILY MEDICINE

## 2018-10-23 RX ORDER — METOCLOPRAMIDE 10 MG/1
10 TABLET ORAL EVERY 6 HOURS PRN
Status: DISCONTINUED | OUTPATIENT
Start: 2018-10-23 | End: 2018-10-25 | Stop reason: HOSPADM

## 2018-10-23 RX ORDER — PROCHLORPERAZINE 25 MG
25 SUPPOSITORY, RECTAL RECTAL EVERY 12 HOURS PRN
Status: DISCONTINUED | OUTPATIENT
Start: 2018-10-23 | End: 2018-10-25 | Stop reason: HOSPADM

## 2018-10-23 RX ORDER — PROCHLORPERAZINE MALEATE 10 MG
10 TABLET ORAL EVERY 6 HOURS PRN
Status: DISCONTINUED | OUTPATIENT
Start: 2018-10-23 | End: 2018-10-25 | Stop reason: HOSPADM

## 2018-10-23 RX ORDER — ONDANSETRON 2 MG/ML
4 INJECTION INTRAMUSCULAR; INTRAVENOUS EVERY 6 HOURS PRN
Status: DISCONTINUED | OUTPATIENT
Start: 2018-10-23 | End: 2018-10-25 | Stop reason: HOSPADM

## 2018-10-23 RX ORDER — POLYETHYLENE GLYCOL 3350 17 G/17G
17 POWDER, FOR SOLUTION ORAL DAILY PRN
Status: DISCONTINUED | OUTPATIENT
Start: 2018-10-23 | End: 2018-10-25 | Stop reason: HOSPADM

## 2018-10-23 RX ORDER — METOCLOPRAMIDE HYDROCHLORIDE 5 MG/ML
10 INJECTION INTRAMUSCULAR; INTRAVENOUS EVERY 6 HOURS PRN
Status: DISCONTINUED | OUTPATIENT
Start: 2018-10-23 | End: 2018-10-25 | Stop reason: HOSPADM

## 2018-10-23 RX ORDER — ONDANSETRON 4 MG/1
4 TABLET, ORALLY DISINTEGRATING ORAL EVERY 6 HOURS PRN
Status: DISCONTINUED | OUTPATIENT
Start: 2018-10-23 | End: 2018-10-25 | Stop reason: HOSPADM

## 2018-10-23 RX ORDER — NALOXONE HYDROCHLORIDE 0.4 MG/ML
.1-.4 INJECTION, SOLUTION INTRAMUSCULAR; INTRAVENOUS; SUBCUTANEOUS
Status: DISCONTINUED | OUTPATIENT
Start: 2018-10-23 | End: 2018-10-23

## 2018-10-23 RX ORDER — OXYCODONE HYDROCHLORIDE 5 MG/1
5-10 TABLET ORAL EVERY 4 HOURS PRN
Status: DISCONTINUED | OUTPATIENT
Start: 2018-10-23 | End: 2018-10-25 | Stop reason: HOSPADM

## 2018-10-23 RX ORDER — TRIAMCINOLONE ACETONIDE 5 MG/G
CREAM TOPICAL 2 TIMES DAILY
Status: DISCONTINUED | OUTPATIENT
Start: 2018-10-23 | End: 2018-10-25 | Stop reason: HOSPADM

## 2018-10-23 RX ORDER — ACETAMINOPHEN 500 MG
1000 TABLET ORAL 3 TIMES DAILY
Status: DISCONTINUED | OUTPATIENT
Start: 2018-10-23 | End: 2018-10-25 | Stop reason: HOSPADM

## 2018-10-23 RX ADMIN — SODIUM CHLORIDE, POTASSIUM CHLORIDE, SODIUM LACTATE AND CALCIUM CHLORIDE 1000 ML: 600; 310; 30; 20 INJECTION, SOLUTION INTRAVENOUS at 00:22

## 2018-10-23 RX ADMIN — CEFAZOLIN SODIUM 2 G: 2 INJECTION, SOLUTION INTRAVENOUS at 06:24

## 2018-10-23 RX ADMIN — ENOXAPARIN SODIUM 40 MG: 40 INJECTION SUBCUTANEOUS at 00:49

## 2018-10-23 RX ADMIN — CEFAZOLIN SODIUM 2 G: 2 INJECTION, SOLUTION INTRAVENOUS at 20:43

## 2018-10-23 RX ADMIN — CEFAZOLIN SODIUM 2 G: 2 INJECTION, SOLUTION INTRAVENOUS at 00:41

## 2018-10-23 RX ADMIN — TRIAMCINOLONE ACETONIDE: 5 CREAM TOPICAL at 08:35

## 2018-10-23 RX ADMIN — ACETAMINOPHEN 1000 MG: 500 TABLET ORAL at 08:35

## 2018-10-23 RX ADMIN — ACETAMINOPHEN 1000 MG: 500 TABLET ORAL at 14:54

## 2018-10-23 RX ADMIN — SODIUM CHLORIDE, POTASSIUM CHLORIDE, SODIUM LACTATE AND CALCIUM CHLORIDE 1000 ML: 600; 310; 30; 20 INJECTION, SOLUTION INTRAVENOUS at 10:17

## 2018-10-23 RX ADMIN — MICONAZOLE NITRATE: 2 POWDER TOPICAL at 11:21

## 2018-10-23 RX ADMIN — SODIUM CHLORIDE, POTASSIUM CHLORIDE, SODIUM LACTATE AND CALCIUM CHLORIDE 1000 ML: 600; 310; 30; 20 INJECTION, SOLUTION INTRAVENOUS at 01:25

## 2018-10-23 RX ADMIN — ACETAMINOPHEN 1000 MG: 500 TABLET ORAL at 19:31

## 2018-10-23 RX ADMIN — PAROXETINE HYDROCHLORIDE 30 MG: 30 TABLET, FILM COATED ORAL at 18:10

## 2018-10-23 RX ADMIN — MICONAZOLE NITRATE: 2 POWDER TOPICAL at 19:47

## 2018-10-23 RX ADMIN — CEFAZOLIN SODIUM 2 G: 2 INJECTION, SOLUTION INTRAVENOUS at 14:53

## 2018-10-23 RX ADMIN — TRIAMCINOLONE ACETONIDE: 5 CREAM TOPICAL at 19:48

## 2018-10-23 ASSESSMENT — ACTIVITIES OF DAILY LIVING (ADL)
ADLS_ACUITY_SCORE: 19
ADLS_ACUITY_SCORE: 16
RETIRED_COMMUNICATION: 0-->UNDERSTANDS/COMMUNICATES WITHOUT DIFFICULTY
ADLS_ACUITY_SCORE: 16
BATHING: 0-->INDEPENDENT
RETIRED_EATING: 0-->INDEPENDENT
DRESS: 0-->INDEPENDENT
ADLS_ACUITY_SCORE: 19
TOILETING: 1-->ASSISTIVE EQUIPMENT
COGNITION: 0 - NO COGNITION ISSUES REPORTED
PREVIOUS_RESPONSIBILITIES: MEAL PREP;HOUSEKEEPING;LAUNDRY;SHOPPING;WORK
TRANSFERRING: 1-->ASSISTIVE EQUIPMENT
SWALLOWING: 0-->SWALLOWS FOODS/LIQUIDS WITHOUT DIFFICULTY
FALL_HISTORY_WITHIN_LAST_SIX_MONTHS: NO
ADLS_ACUITY_SCORE: 21
WHICH_OF_THE_ABOVE_FUNCTIONAL_RISKS_HAD_A_RECENT_ONSET_OR_CHANGE?: AMBULATION
ADLS_ACUITY_SCORE: 16
AMBULATION: 1-->ASSISTIVE EQUIPMENT

## 2018-10-23 NOTE — PLAN OF CARE
Problem: Patient Care Overview  Goal: Plan of Care/Patient Progress Review  Discharge Planner OT   Patient plan for discharge: Home with OP PT- pt states she got a referral for OP PT last week to address her MS.     Current status: Independent- SBA for ADLs using FWW. Pt does have decreased activity tolerance likely d/t MS and morbid obesity- pt states she feels at her baseline strength.     Barriers to return to prior living situation: None    Recommendations for discharge: Home with OP PT    Rationale for recommendations: No further IP OT needs identified at this time. Pt appears baseline for ADLs.     Occupational Therapy Discharge Summary    Reason for therapy discharge:    All goals and outcomes met, no further needs identified.    Progress towards therapy goal(s). See goals on Care Plan in [facility] electronic health record for goal details.  Goals met    Therapy recommendation(s):    OP PT to address strength/activity tolerance/balance.            Entered by: Ginette Nolen 10/23/2018 12:17 PM

## 2018-10-23 NOTE — PLAN OF CARE
Problem: Patient Care Overview  Goal: Plan of Care/Patient Progress Review  Outcome: No Change  Patient ambulates with SBA and walker within the room. Denying any pain. Redness noted on bilateral lower extremities, abdomen and lower back. During skin assessment this morning, it was noted that patient has new blisters on her lower back, measuring 10cm wide x 7cm tall, all intact. MRSA results came back negative so precautions were removed. Blood cultures pending. Sepsis fired but lactic draw was negative at 1.6. Temp this morning 99.7, scheduled tylenol administered, recheck at lunch was 98.8. Nystatin powder obtained for abdominal fold redness. Patient is A&O, uses call light appropriately. /67 (BP Location: Right arm)  Pulse 87  Temp 98.8  F (37.1  C) (Oral)  Resp 18  Wt (!) 180.5 kg (397 lb 14.9 oz)  SpO2 93%  BMI 68.09 kg/m2

## 2018-10-23 NOTE — ED NOTES
Report called to KINDRA Glover and all questions answered. She is to assume care of PT upon arrival to room 2312. PT to go to CT first.

## 2018-10-23 NOTE — H&P
Admitted:     10/22/2018      SUBJECTIVE:  Rigors, chills and fever.      HISTORY OF PRESENT ILLNESS:  The patient complains of fever starting this morning with rigors, chills, recurrent throughout the morning and then finally stopped after she took some Tylenol this afternoon.  A friend came over and saw that her leg was very red and insisted she come in.        She admits to some increased weakness over the last couple of days, possibly even a week.  She was put on a steroid burst IV x 3 days for a possible MS flare 2 weeks ago.      She has a history of cellulitis in that leg at least on 2 separate occasions, but has never had fevers like this and has never had to be admitted.      In the ED there was some concern that she also had erythema of her pannus in the abdomen but no tenderness.  A CT scan was done to rule out Tiffany's disease which was negative.      PAST MEDICAL HISTORY:  Significant for MS followed by Neurology, polycystic ovarian syndrome, anxiety/moderate major depression, morbid obesity, hypertension, restless legs, chronic leg edema with stasis dermatitis, NARCISA.      Prior to Admission medications    Medication Sig Last Dose Taking? Auth Provider   acetaminophen (TYLENOL) 500 MG tablet Take 500-1,000 mg by mouth once as needed for mild pain 10/22/2018 at 1330 Yes Reported, Patient   Cholecalciferol (VITAMIN D3 PO) Take 10,000 Units by mouth daily  10/21/2018 at pm Yes Reported, Patient   desogestrel-ethinyl estradiol (APRI) 0.15-30 MG-MCG per tablet Take 1 tablet by mouth daily Take as continuous daily dosing (omit placebo pills until after three months of hormone tablets.) 10/21/2018 at pm Yes Elton Epstein MD   ibuprofen (ADVIL/MOTRIN) 200 MG tablet Take 600 mg by mouth every 6 hours as needed for mild pain Past Month at Unknown time Yes Reported, Patient   order for DME Equipment ordered: RESMED CPAP Mask type: Nasal Settings: 10 CM H2O Past Month at Unknown time Yes Reported, Patient    PARoxetine (PAXIL) 30 MG tablet Take 1 tablet (30 mg) by mouth every morning  Patient taking differently: Take 30 mg by mouth every evening  10/21/2018 at pm Yes Alex Hugo MD   Ocrelizumab (OCREVUS IV)  More than a month at Unknown time  Reported, Patient          ALLERGIES:  NONE KNOWN.      SOCIAL HISTORY:  She lives alone but friends check in on her frequently.  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  She is a never smoker, rare alcohol.      FAMILY HISTORY:  Father had MS, heart disease, diabetes, melanoma, sleep apnea.  Mother had cancer.      REVIEW OF SYSTEMS:  Other than the above is unremarkable.  She has had some nausea with this.  She has had some increased weakness over the last several weeks as noted above and did have a steroid burst for 3 days 2 weeks ago.  Today, she had definite weakness with difficulty getting up and moving at all.  She had marked anorexia today.  The rest of 10-point review of systems was negative.      PHYSICAL EXAMINATION:   GENERAL:  She is awake, alert, oriented x 3, appears somewhat ill.  Flushed face.   VITAL SIGNS:  Temperature 102.8 max; pulse 127 max, currently 101; respirations 26 max, currently 20; blood pressure 108/52; O2 sat 95% on room air.   HEENT:  Eyes show pupils equal and reactive, EOMs intact.  TMs normal.  Nasal mucosa normal.  Throat is normal.   NECK:  Supple, without masses, nodes or thyromegaly.   CHEST:  Clear to A and P.   CARDIOVASCULAR:  Regular rate and rhythm without murmur, click or rub.  Pulses are brisk and equal.  No JVD, HJR, but 3+ pitting edema both ankles and large pitting edema of the abdominal pannus.   ABDOMEN:  Obese, soft, nontender, without HSM or masses.  There is some erythema approximately from the umbilicus with vague borders down to just above the deepest crevice.  There is no tenderness in those areas.  No significant erythema down to the deep inguinal folds.   GENITOURINARY AND RECTAL:  Deferred.    EXTREMITIES:  Show erythema of both lower extremities, but on the right it spares the ankle folds, is nontender and extends only to just below the knee and is primarily only anterior.  On the left it is circumferential from the toes all the way up to above the knee, and there is tenderness primarily about the knee and just below it, primarily in the soft tissues, even in the calf there is some tenderness.  No patricia weeping that I can see, though there may be a little bit posteriorly.  There is no sparing of the ankle fold.   NEUROLOGIC:  Shows weakness of the left leg, particularly.  She cannot dorsiflex.  The right leg she can dorsiflex the toes, cannot lift the leg off the bed.  The upper extremities show good finger-nose, rapid alternating movements, strength and sensation.      IMAGING:  CT scan of the abdomen just shows some subcutaneous edema, no evidence of Tiffany's or pockets of pus.      LABORATORY DATA:  White count is 34,000.  Bilirubin 2.1.  Lactic acid 2.3.      ASSESSMENT:   1.  Sepsis secondary to left lower leg cellulitis.  With the rigors, high fever, high white count and lack of appetite, she is high risk for bacteremia.  Blood cultures were done.  Ancef was started immediately in the ED, and we will continue this.  With recurrent cellulitis in the same leg strep is by far the most likely, but we will check a PCR nasal swab for MRSA and we will consider empiric change to vancomycin if that is positive.  2 liters of IV fluids given.  We will recheck lactic.  I will continue the LR overnight.   2.  Lymphedema secondary to morbid obesity.  Discussed with her the stasis dermatitis that is in the right leg and likely underlies the left leg when it is not infected.  She would benefit from lymphedema as an outpatient starting a couple days after this infection is resolved.   3.  NARCISA.  She does not use CPAP.   4.  MS.  I do not see any flare of MS at this time.   5.  Stasis dermatitis.  We will try  some triamcinolone on the abdominal pannus and on the legs, though the left leg is likely more cellulitis.   6.  CODE STATUS:  Full.   7.  Prophylaxis:  Enoxaparin.      DISPOSITION:  She needs inpatient care for this cellulitis in the leg, sepsis and high risk for bacteremia.         GROVER SCOTT MD             D: 10/23/2018   T: 10/23/2018   MT: NTS      Name:     CITLALY MICHEL   MRN:      [id number]        Account:      [id number]   :      1975        Admitted:     10/22/2018                   Document: [id number]

## 2018-10-23 NOTE — PROGRESS NOTES
10/23/18 1200   Quick Adds   Type of Visit Initial Occupational Therapy Evaluation   Living Environment   Lives With alone   Living Arrangements house  (town home- 1 level)   Home Accessibility (has walk-in shower)   Number of Stairs to Enter Home 1   Stair Railings at Home none   Transportation Available car   Self-Care   Usual Activity Tolerance moderate   Current Activity Tolerance fair   Functional Level Prior   Ambulation 1-->assistive equipment  (uses walker)   Transferring 0-->independent   Toileting 0-->independent   Bathing 0-->independent  (depending on day- may sponge bathe)   Dressing 1-->assistive equipment  (reacher)   Eating 0-->independent   Communication 0-->understands/communicates without difficulty   Swallowing 0-->swallows foods/liquids without difficulty   Cognition 0 - no cognition issues reported   Fall history within last six months no   General Information   Onset of Illness/Injury or Date of Surgery - Date 10/22/18   Referring Physician Caleb Green MD   Patient/Family Goals Statement To return home.    Additional Occupational Profile Info/Pertinent History of Current Problem The patient complains of fever starting this morning with rigors, chills, recurrent throughout the morning and then finally stopped after she took some Tylenol this afternoon.  A friend came over and saw that her leg was very red and insisted she come in.  cellulitis/sepsis   Cognitive Status Examination   Orientation orientation to person, place and time   Pain Assessment   Patient Currently in Pain No   Range of Motion (ROM)   ROM Comment B UE ROM: WNL   Strength   Strength Comments B UE strength: WNL   Transfer Skill: Sit to Stand   Level of [place]: Sit/Stand stand-by assist   Physical Assist/Nonphysical Assist: Sit/Stand 1 person assist   Transfer Skill: Sit to Stand full weight-bearing   Assistive Device for Transfer: Sit/Stand rolling walker   Transfer Skill: Toilet Transfer   Level of [place]:  "Toilet stand-by assist   Physical Assist/Nonphysical Assist: Toilet 1 person assist   Weight-Bearing Restrictions: Toilet full weight-bearing   Assistive Device rolling walker   Upper Body Dressing   Level of [place]: Dress Upper Body independent   Lower Body Dressing   Level of [place]: Dress Lower Body stand-by assist   Physical Assist/Nonphysical Assist: Dress Lower Body supervision   Assistive Device reacher   Instrumental Activities of Daily Living (IADL)   Previous Responsibilities meal prep;housekeeping;laundry;shopping;work   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    Activities of Daily Living Analysis   Impairments Contributing to Impaired Activities of Daily Living strength decreased   General Therapy Interventions   Planned Therapy Interventions ADL retraining   Clinical Impression   Criteria for Skilled Therapeutic Interventions Met yes, treatment indicated   OT Diagnosis decreased activity tolerance   Influenced by the following impairments weakness/illness   Assessment of Occupational Performance 1-3 Performance Deficits   Identified Performance Deficits general activity tolerance for IADLs   Clinical Decision Making (Complexity) Low complexity   Therapy Frequency daily   Predicted Duration of Therapy Intervention (days/wks) 1x treat   Anticipated Discharge Disposition Home   Risks and Benefits of Treatment have been explained. Yes   Patient, Family & other staff in agreement with plan of care Yes   [facility] AM-PAC  \"6 Clicks\" Daily Activity Inpatient Short Form   1. Putting on and taking off regular lower body clothing? 3 - A Little   2. Bathing (including washing, rinsing, drying)? 3 - A Little   3. Toileting, which includes using toilet, bedpan or urinal? 4 - None   4. Putting on and taking off regular upper body clothing? 4 - None   5. Taking care of personal grooming such as brushing teeth? 4 - None   6. Eating meals? 4 - None   Daily " Activity Raw Score (Score out of 24.Lower scores equate to lower levels of function) 22   Total Evaluation Time   Total Evaluation Time (Minutes) 6

## 2018-10-23 NOTE — PROGRESS NOTES
[facility] Hospitalist Progress Note           Assessment and Plan:     Sepsis secondary to left lower leg cellulitis complicated by obesity and lymphedema   10/22/18 -- With the rigors, high fever, high white count and lack of appetite, she is high risk for bacteremia.  Blood cultures were done.  Ancef was started immediately in the ED, and we will continue this.  With recurrent cellulitis in the same leg strep is by far the most likely, but we will check a PCR nasal swab for MRSA and we will consider empiric change to vancomycin if that is positive.  2 liters of IV fluids given.  We will recheck lactic.  I will continue the LR overnight.   10/23/2018 -- sepsis resolved, cellulitis slowly improving.       Generalized weakness  10/23/2018 --  due to cellulitis/sepsis as above.  Usually walks with a walker due to her MS at baseline.   Improving with treatment of infection as above.       Lymphedema secondary to morbid obesity.    10/22/18 -- Discussed with her the stasis dermatitis that is in the right leg and likely underlies the left leg when it is not infected.  She would benefit from lymphedema as an outpatient starting a couple days after this infection is resolved.   10/23/2018 -- no change.       Rash  10/23/2018 -- small rash on back improving, suspect just contact/irritation.  Skin fold fungal infections being treated with miconazole.       NARCISA.    She does not use CPAP.     MS.   No apparent flare of MS at this time.     CODE STATUS:  Full.     Prophylaxis:  Enoxaparin.     Lines  PIV    Disposition  Improving, but anticipate at least 1 and likely 2 more days inpatient.  Hopeful discharge home once ready.              Interval History:   Improving, strength which was 40% of baseline on admission up to80%.   Redness a bit better. No pain today.  No fever or chills.  No new concerns.                Review of Systems:   .[id number]             Medications:   Current active medications and PTA medications  reviewed, see medication list for details.            Physical Exam:   Vitals were reviewed  Patient Vitals for the past 24 hrs:   BP Temp Temp src Pulse Heart Rate Resp SpO2 Weight   10/23/18 1540 129/61 98.9  F (37.2  C) Oral - 94 18 97 % -   10/23/18 1142 111/67 98.8  F (37.1  C) Oral - 92 18 93 % -   10/23/18 0839 121/75 99.7  F (37.6  C) Oral - 105 20 96 % -   10/23/18 0655 - - - - - - - (!) 180.5 kg (397 lb 14.9 oz)   10/23/18 0457 131/71 97.7  F (36.5  C) Oral 87 - 18 99 % -   10/22/18 2338 108/52 101.1  F (38.4  C) Oral - 101 20 95 % -   10/22/18 2302 - 100.7  F (38.2  C) Oral - - - - -   10/22/18 2200 102/55 - - - - - - -   10/22/18 2145 108/49 - - - - - - -   10/22/18 2130 105/52 - - - - - - -   10/22/18 2117 - 102.8  F (39.3  C) - - - - - -   10/22/18 2115 118/46 - - - - - 94 % -   10/22/18 2100 120/58 - - - - - 95 % -   10/22/18 2045 114/65 - - - - - 93 % -   10/22/18 2030 - - - - - - 92 % -   10/22/18 2015 - - - - - - 91 % -   10/22/18 1945 - - - - - - 94 % -   10/22/18 1930 - - - - - - 94 % -   10/22/18 191 - - - - - - 93 % -   10/22/18 190 - - - - - - 95 % -   10/22/18 184 - - - - - - 95 % -   10/22/18 183 - - - - - - 94 % -   10/22/18 1824 (!) 146/96 100.5  F (38.1  C) Oral - 127 26 94 % -       Temperatures:  Current - Temp: 98.9  F (37.2  C); Max - Temp  Av  F (37.8  C)  Min: 97.7  F (36.5  C)  Max: 102.8  F (39.3  C)  Respiration range: Resp  Av  Min: 18  Max: 26  Pulse range: Pulse  Av  Min: 87  Max: 87  Blood pressure range: Systolic (24hrs), Av , Min:102 , Max:146   ; Diastolic (24hrs), Av, Min:46, Max:96    Pulse oximetry range: SpO2  Av.4 %  Min: 91 %  Max: 99 %  I/O last 3 completed shifts:  In: 2747 [P.O.:350; I.V.:2397]  Out: 100 [Urine:100]    Intake/Output Summary (Last 24 hours) at 10/23/18 1729  Last data filed at 10/23/18 1700   Gross per 24 hour   Intake             2747 ml   Output              700 ml   Net             2047 ml     EXAM:  General:  awake and alert, NAD, oriented x 3  Head: normocephalic  Neck: unremarkable, no lymphadenopathy   HEENT: oropharynx pink and moist    Heart: Regular rate and rhythm, no murmurs, rubs, or gallops  Lungs: clear to auscultation bilaterally with good air movement throughout  Abdomen: soft, non-tender, no masses or organomegaly  Extremities: chronic lymphedema in lower extremities  Skin: redness left lower extremity mildly improved with slight regression from upper marking.  Fungal change sin skin folds.  Rash on back shows improvement from this AM - erythema resolved, blisters nearly resolved.  Skin otherwise unremarkable.               Data:     Results for orders placed or performed during the hospital encounter of 10/22/18 (from the past 24 hour(s))   UA reflex to Microscopic   Result Value Ref Range    Color Urine Yellow     Appearance Urine Slightly Cloudy     Glucose Urine Negative NEG^Negative mg/dL    Bilirubin Urine Negative NEG^Negative    Ketones Urine Negative NEG^Negative mg/dL    Specific Gravity Urine 1.017 1.003 - 1.035    Blood Urine Negative NEG^Negative    pH Urine 8.0 (H) 5.0 - 7.0 pH    Protein Albumin Urine Negative NEG^Negative mg/dL    Urobilinogen mg/dL 4.0 (H) 0.0 - 2.0 mg/dL    Nitrite Urine Negative NEG^Negative    Leukocyte Esterase Urine Negative NEG^Negative    Source Midstream Urine     RBC Urine <1 0 - 2 /HPF    WBC Urine 1 0 - 5 /HPF    Squamous Epithelial /HPF Urine <1 0 - 1 /HPF   Urine Culture   Result Value Ref Range    Specimen Description Midstream Urine     Special Requests Specimen received in preservative     Culture Micro Culture in progress    Blood culture   Result Value Ref Range    Specimen Description Blood Left Arm     Special Requests Received in aerobic bottle only     Culture Micro No growth after 9 hours    Blood culture   Result Value Ref Range    Specimen Description Blood Right Arm     Special Requests Received in aerobic bottle only     Culture Micro No growth  after 9 hours    CBC with platelets differential   Result Value Ref Range    WBC 34.3 (H) 4.0 - 11.0 10e9/L    RBC Count 5.21 (H) 3.8 - 5.2 10e12/L    Hemoglobin 14.2 11.7 - 15.7 g/dL    Hematocrit 45.0 35.0 - 47.0 %    MCV 86 78 - 100 fl    MCH 27.3 26.5 - 33.0 pg    MCHC 31.6 31.5 - 36.5 g/dL    RDW 14.3 10.0 - 15.0 %    Platelet Count 261 150 - 450 10e9/L    Diff Method Automated Method     % Neutrophils 92.7 %    % Lymphocytes 0.9 %    % Monocytes 4.8 %    % Eosinophils 0.0 %    % Basophils 0.2 %    % Immature Granulocytes 1.4 %    Nucleated RBCs 0 0 /100    Absolute Neutrophil 31.8 (H) 1.6 - 8.3 10e9/L    Absolute Lymphocytes 0.3 (L) 0.8 - 5.3 10e9/L    Absolute Monocytes 1.7 (H) 0.0 - 1.3 10e9/L    Absolute Eosinophils 0.0 0.0 - 0.7 10e9/L    Absolute Basophils 0.1 0.0 - 0.2 10e9/L    Abs Immature Granulocytes 0.5 (H) 0 - 0.4 10e9/L    Absolute Nucleated RBC 0.0    Comprehensive metabolic panel   Result Value Ref Range    Sodium 136 133 - 144 mmol/L    Potassium 4.3 3.4 - 5.3 mmol/L    Chloride 99 94 - 109 mmol/L    Carbon Dioxide 27 20 - 32 mmol/L    Anion Gap 10 3 - 14 mmol/L    Glucose 136 (H) 70 - 99 mg/dL    Urea Nitrogen 13 7 - 30 mg/dL    Creatinine 0.82 0.52 - 1.04 mg/dL    GFR Estimate 76 >60 mL/min/1.7m2    GFR Estimate If Black >90 >60 mL/min/1.7m2    Calcium 8.5 8.5 - 10.1 mg/dL    Bilirubin Total 2.1 (H) 0.2 - 1.3 mg/dL    Albumin 3.2 (L) 3.4 - 5.0 g/dL    Protein Total 6.7 (L) 6.8 - 8.8 g/dL    Alkaline Phosphatase 62 40 - 150 U/L    ALT 39 0 - 50 U/L    AST 24 0 - 45 U/L   Lactic acid whole blood   Result Value Ref Range    Lactic Acid 2.3 (H) 0.7 - 2.0 mmol/L   XR Chest 2 Views    Narrative    CHEST TWO VIEWS  10/22/2018 8:16 PM     HISTORY: Cough with fever;     COMPARISON: None.      Impression    IMPRESSION: Normal.    WAYNE BLACKMON MD   CT Abdomen Pelvis w Contrast    Narrative    CT ABDOMEN AND PELVIS WITH CONTRAST 10/22/2018 10:42 PM    HISTORY: Evaluate for Tiffany's  gangrene.    TECHNIQUE: Helical axial scans from dome of liver through pubic  symphysis with 100 mL Isovue -370 IV contrast. Radiation dose for this  scan was reduced using automated exposure control, adjustment of the  mA and/or kV according to patient size, or iterative reconstruction  technique.    COMPARISON: None.    FINDINGS: Moderate diffuse fatty infiltration of the liver. Prior  cholecystectomy. The spleen, pancreas, bilateral adrenal glands and  kidneys bilaterally are unremarkable. The bowel and mesentery in the  upper abdomen appear normal.    Scans through the pelvis show a left-sided pelvic mass measuring 10.1  x 8.6 x 7.7 cm. This is mostly composed of benign-appearing fat but  also shows a block of calcification as well as amorphus nonfatty  components. The findings are consistent with a benign dermoid. No  acute-appearing abnormality or free fluid in the pelvis. The appendix  is not definitely identified but there is no CT evidence for  appendicitis.    There is some mild induration of subcutaneous fat anteriorly in the  lower abdomen. There is no evidence for inflammatory change, soft  tissue gas or abnormal fluid collections in the peroneal region to  indicate cellulitis/infection in this area. Morbid obesity is noted.      Impression    IMPRESSION:  1. Fatty liver and prior cholecystectomy.  2. No acute-appearing intra-abdominal or pelvic abnormality.  3. Large benign ovarian dermoid on the left.  4. Nonspecific mild induration subcutaneous fat lower anterior  abdominal wall. There is no evidence for soft tissue gas or fluid  collections in this region and there are no changes of soft tissue  inflammation in the perineal region. In particular, there is no  evidence for Tiffany's gangrene.    CEDRICK DIXON MD   Platelet count   Result Value Ref Range    Platelet Count 210 150 - 450 10e9/L   Creatinine   Result Value Ref Range    Creatinine 0.88 0.52 - 1.04 mg/dL    GFR Estimate 70 >60  mL/min/1.7m2    GFR Estimate If Black 85 >60 mL/min/1.7m2   Lactic acid whole blood   Result Value Ref Range    Lactic Acid 0.8 0.7 - 2.0 mmol/L   Methicillin Resistant Staph Aureus PCR   Result Value Ref Range    Specimen Description Nares     Methicillin Resist/Sens S. aureus PCR Negative NEG^Negative   CBC with platelets differential   Result Value Ref Range    WBC 18.2 (H) 4.0 - 11.0 10e9/L    RBC Count 4.63 3.8 - 5.2 10e12/L    Hemoglobin 12.8 11.7 - 15.7 g/dL    Hematocrit 40.8 35.0 - 47.0 %    MCV 88 78 - 100 fl    MCH 27.6 26.5 - 33.0 pg    MCHC 31.4 (L) 31.5 - 36.5 g/dL    RDW 15.0 10.0 - 15.0 %    Platelet Count 193 150 - 450 10e9/L    Diff Method Automated Method     % Neutrophils 91.6 %    % Lymphocytes 1.5 %    % Monocytes 5.9 %    % Eosinophils 0.1 %    % Basophils 0.2 %    % Immature Granulocytes 0.7 %    Nucleated RBCs 0 0 /100    Absolute Neutrophil 16.6 (H) 1.6 - 8.3 10e9/L    Absolute Lymphocytes 0.3 (L) 0.8 - 5.3 10e9/L    Absolute Monocytes 1.1 0.0 - 1.3 10e9/L    Absolute Eosinophils 0.0 0.0 - 0.7 10e9/L    Absolute Basophils 0.0 0.0 - 0.2 10e9/L    Abs Immature Granulocytes 0.1 0 - 0.4 10e9/L    Absolute Nucleated RBC 0.0    Lactic acid level STAT for sepsis protocol   Result Value Ref Range    Lactate for Sepsis Protocol 1.6 0.7 - 2.0 mmol/L           Attestation:  I have reviewed today's vital signs, notes, medications, labs and imaging.  Amount of time performed on this daily note: 30 minutes.     Caleb Green MD, MD

## 2018-10-23 NOTE — PROGRESS NOTES
Secondary skin assessment done with admitting RN, Belen JOHN agree with assessment and documented findings.

## 2018-10-23 NOTE — PLAN OF CARE
Problem: Patient Care Overview  Goal: Plan of Care/Patient Progress Review  Discharge Planner PT   Patient plan for discharge: Home w/ outpatient PT  Current status: CGA w/ transfers from recliner to FWW w/ verbal cues, CGA w/ FWW up to 20 feet w/ 1 turn w/ management of IV pole  Barriers to return to prior living situation: decreased functional capacity compared to baseline  Recommendations for discharge: Home w/ outpatient PT  Rationale for recommendations: doing well w/ transfers, not far from baseline, home accessibility, PT judgement       Entered by: Cole Alex 10/23/2018 11:39 AM

## 2018-10-23 NOTE — PLAN OF CARE
Problem: Skin and Soft Tissue Infection (Adult)  Goal: Signs and Symptoms of Listed Potential Problems Will be Absent, Minimized or Managed (Skin and Soft Tissue Infection)  Signs and symptoms of listed potential problems will be absent, minimized or managed by discharge/transition of care (reference Skin and Soft Tissue Infection (Adult) CPG).   Outcome: No Change  Pt alert, oriented, lift. Pt reports she walks with a walker at home, however has been too weak lately. Pt redness to L leg within marked lines. Pt denies pain, nausea. 1 L bolus LR given upon admission. LR running at 125 ml/hr. Incontinent. IV antibiotics given. /71  Pulse 87  Temp 97.7  F (36.5  C) (Oral)  Resp 18  Wt (!) 180.5 kg (397 lb 14.9 oz)  SpO2 99%  BMI 68.09 kg/m2

## 2018-10-23 NOTE — ED NOTES
CT called and reported both IVs in place blew during contrast injection and requesting RN to place new IV. New 20G IV placed with US

## 2018-10-23 NOTE — PROGRESS NOTES
Blisters on lower back remain intact. Adaptic dressing peeled off, so new mepalex placed. Redness on abdomen remains the same as this morning. LLE, RLE, abdomen redness improving. Afebrile.

## 2018-10-23 NOTE — PROGRESS NOTES
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 ambulation   General Information   Onset of Illness/Injury or Date of Surgery - Date 10/22/18   Referring Physician Dr. Wallace   Patient/Family Goals Statement get stronger to get back home and start OP PT   Pertinent History of Current Problem (include personal factors and/or comorbidities that impact the POC) per H&P: The patient complains of fever starting this morning with rigors, chills, recurrent throughout the morning and then finally stopped after she took some Tylenol this afternoon.  A friend came over and saw that her leg was very red and insisted she come in, history of MS at baseline   Precautions/Limitations no known precautions/limitations   Cognitive Status Examination   Orientation orientation to person, place and time   Level of Consciousness alert   Follows Commands and Answers Questions 100% of the time   Personal Safety and Judgment intact   Memory intact   Pain Assessment   Patient Currently in Pain No   Integumentary/Edema   Integumentary/Edema Comments (redness from cellulitis)   Posture    Posture Forward head position    Strength   Manual Muscle Testing Quick Adds MMT: Hip;MMT: Knee;MMT: Ankle   MMT: Hip, Rehab Eval   Hip Flexion - Left Side (3-/5) fair minus, left   Hip Flexion - Right Side (4-/5) good minus, right   MMT: Knee, Rehab Eval   Knee Extension - Left Side (4/5) good, left   Knee Extension - Right Side (3-/5) fair minus, right   MMT: Ankle, Rehab Eval   Ankle Dorsiflexion - Left Side (2+/5) poor plus, left   Ankle Plantarflexion - Left Side (3+/5) fair plus, left   Ankle Dorsiflexion - Right Side 5/5) normal,left   Ankle Plantarflexion - Right Side 5/5) normal,left   Transfer Skills   Transfer Transfer Skill: Sit to Stand;Transfer Skill:  Stand to Sit   Transfer Comments Pt is CGA w/ sit to stand and stand to sit transfers from recliner to FWW   Transfer Skill:  Sit to Stand   Level of [place]: Sit/Stand contact guard   Physical Assist/Nonphysical Assist: Sit/Stand set-up required;supervision;verbal cues   Weightbearing Restrictions: Sit/Stand full weight-bearing   Assistive Device for Transfer: Sit/Stand standard walker   Transfer Skill: Stand to Sit   Level of [place]: Stand/Sit contact guard   Physical Assist/Nonphysical Assist: Stand/Sit set-up required;supervision;verbal cues   Weight-Bearing Restrictions: Stand/Sit full weight-bearing   Assistive Device: Stand to Sit standard walker   Gait   Gait Gait Skill   Gait Comments Pt is CGA w/ gait in room w/ management of lines and leads and verbal cues for sequencing and safety   Gait Skills   Level of [place]: Gait contact guard   Physical Assist/Nonphysical Assist: Gait set-up required;supervision;verbal cues   Weight-Bearing Restrictions: Gait full weight-bearing   Assistive Device for Transfer: Gait standard walker   Gait Distance (10 feet)   Balance   Balance no deficits were identified   Sensory Examination   Sensory Perception no deficits were identified   Coordination   Coordination no deficits were identified   Muscle Tone   Muscle Tone no  "deficits were identified   Muscle Tone Comments drop foot on L at baseline   General Therapy Interventions   Planned Therapy Interventions gait training;transfer training;strengthening;neuromuscular re-education   Intervention Comments Too address functional impairments, improve overall function   Clinical Impression   Criteria for Skilled Therapeutic Intervention yes, treatment indicated   PT Diagnosis generalized decondition s/p celluits    Influenced by the following impairments weakness, reduced gait distance   Functional limitations due to impairments decreased functional endurance and weakness   Clinical Presentation Stable/Uncomplicated   Clinical Presentation Rationale PT judgement, subjective and objective data   Clinical Decision Making (Complexity) Low complexity   Therapy Frequency` daily   Predicted Duration of Therapy Intervention (days/wks) 3 days   Anticipated Discharge Disposition Home with Outpatient Therapy   Risk & Benefits of therapy have been explained Yes   Patient, Family & other staff in agreement with plan of care Yes   Clinical Impression Comments Pt is a pleasant 41 y/o female w/ MS at baseline presenting to PT s/p recent onset of cellulitis. She will benefit from skilled PT to address impairments listed above and improve overall function. She is a good PT candidate.   [facility] [employer] TM \"6 Clicks\"   2016, Trustees of [facility], under license to [employer].  All rights reserved.   6 Clicks Short Forms Basic Mobility Inpatient Short Form   [facility]  \"6 Clicks\" V.2 Basic Mobility Inpatient Short Form   1. Turning from your back to your side while in a flat bed without using bedrails? 4 - None   2. Moving from lying on your back to sitting on the side of a flat bed without using bedrails? 4 - None   3. Moving to and from a bed to a chair (including a wheelchair)? 3 - A Little   4. Standing up from a chair using your arms (e.g., wheelchair, or bedside " chair)? 3 - A Little   5. To walk in hospital room? 3 - A Little   6. Climbing 3-5 steps with a railing? 2 - A Lot   Basic Mobility Raw Score (Score out of 24.Lower scores equate to lower levels of function) 19   Total Evaluation Time   Total Evaluation Time (Minutes) 8       Please Contact me with any questions or concerns. Thank you for for patience and cooperation.     Cole Alex PT, DPT  Flexible Workforce Physical Therapist   [facility]  [email or web address]

## 2018-10-23 NOTE — PROGRESS NOTES
[facility] [place] ADMISSION NOTE    Patient admitted to room 2312 at approximately 2330 via cart from emergency room. Patient was accompanied by transport tech and other:friend.     Verbal SBAR report received from KINDRA Ashley prior to patient arrival.     Patient trasferred to bed via air hussain. Patient alert and oriented X 3. The patient is not having any pain.  . Admission vital signs: Blood pressure 108/52, temperature 101.1  F (38.4  C), temperature source Oral, resp. rate 20, SpO2 95 %. Patient was oriented to plan of care, call light, bed controls, tv, telephone, bathroom and visiting hours.     Risk Assessment    The following safety risks were identified during admission: skin. Yellow risk band applied: NO.     Skin Initial Assessment    This writer admitted this patient and completed a full skin assessment and Ben score in the Adult PCS flowsheet. Appropriate interventions initiated as needed.     Secondary skin check completed by KINDRA Mallory.    Skin  Inspection of bony prominences: Full  Skin WDL:  WDL except, all  Skin Color/Characteristics: redness blanchable (LLE, RLE, abdomen)  Skin Temperature: warm  Skin Moisture: dry  Skin Elasticity: quick return to original state  Skin Integrity:  (open area L thigh, and R abdominal fold, groin redness)    Ben Risk Assessment  Sensory Perception: 4-->no impairment  Moisture: 3-->occasionally moist  Activity: 3-->walks occasionally  Mobility: 3-->slightly limited  Nutrition: 3-->adequate  Friction and Shear: 3-->no apparent problem  Ben Score: 19    Belen Chan

## 2018-10-24 ENCOUNTER — APPOINTMENT (OUTPATIENT)
Dept: PHYSICAL THERAPY | Facility: CLINIC | Age: 43
End: 2018-10-24
Payer: COMMERCIAL

## 2018-10-24 LAB
ALBUMIN SERPL-MCNC: 2.6 G/DL (ref 3.4–5)
ALP SERPL-CCNC: 50 U/L (ref 40–150)
ALT SERPL W P-5'-P-CCNC: 26 U/L (ref 0–50)
ANION GAP SERPL CALCULATED.3IONS-SCNC: 6 MMOL/L (ref 3–14)
AST SERPL W P-5'-P-CCNC: 32 U/L (ref 0–45)
BACTERIA SPEC CULT: ABNORMAL
BACTERIA SPEC CULT: ABNORMAL
BASE EXCESS BLDV CALC-SCNC: 3.7 MMOL/L
BILIRUB SERPL-MCNC: 0.7 MG/DL (ref 0.2–1.3)
BUN SERPL-MCNC: 13 MG/DL (ref 7–30)
CALCIUM SERPL-MCNC: 8.4 MG/DL (ref 8.5–10.1)
CHLORIDE SERPL-SCNC: 105 MMOL/L (ref 94–109)
CO2 SERPL-SCNC: 29 MMOL/L (ref 20–32)
CREAT SERPL-MCNC: 0.77 MG/DL (ref 0.52–1.04)
ERYTHROCYTE [DISTWIDTH] IN BLOOD BY AUTOMATED COUNT: 14.7 % (ref 10–15)
GFR SERPL CREATININE-BSD FRML MDRD: 82 ML/MIN/1.7M2
GLUCOSE SERPL-MCNC: 122 MG/DL (ref 70–99)
HCO3 BLDV-SCNC: 31 MMOL/L (ref 21–28)
HCT VFR BLD AUTO: 40.6 % (ref 35–47)
HGB BLD-MCNC: 12.6 G/DL (ref 11.7–15.7)
LACTATE BLD-SCNC: 1 MMOL/L (ref 0.7–2)
Lab: ABNORMAL
MCH RBC QN AUTO: 27.4 PG (ref 26.5–33)
MCHC RBC AUTO-ENTMCNC: 31 G/DL (ref 31.5–36.5)
MCV RBC AUTO: 88 FL (ref 78–100)
PCO2 BLDV: 56 MM HG (ref 40–50)
PH BLDV: 7.35 PH (ref 7.32–7.43)
PLATELET # BLD AUTO: 161 10E9/L (ref 150–450)
PO2 BLDV: 29 MM HG (ref 25–47)
POTASSIUM SERPL-SCNC: 3.7 MMOL/L (ref 3.4–5.3)
PROT SERPL-MCNC: 6 G/DL (ref 6.8–8.8)
RBC # BLD AUTO: 4.6 10E12/L (ref 3.8–5.2)
SODIUM SERPL-SCNC: 140 MMOL/L (ref 133–144)
SPECIMEN SOURCE: ABNORMAL
WBC # BLD AUTO: 6.9 10E9/L (ref 4–11)

## 2018-10-24 PROCEDURE — 25000132 ZZH RX MED GY IP 250 OP 250 PS 637: Performed by: FAMILY MEDICINE

## 2018-10-24 PROCEDURE — 85027 COMPLETE CBC AUTOMATED: CPT | Performed by: FAMILY MEDICINE

## 2018-10-24 PROCEDURE — 40000193 ZZH STATISTIC PT WARD VISIT: Performed by: PHYSICAL THERAPIST

## 2018-10-24 PROCEDURE — 12000000 ZZH R&B MED SURG/OB

## 2018-10-24 PROCEDURE — 25000128 H RX IP 250 OP 636: Performed by: EMERGENCY MEDICINE

## 2018-10-24 PROCEDURE — 97116 GAIT TRAINING THERAPY: CPT | Mod: GP | Performed by: PHYSICAL THERAPIST

## 2018-10-24 PROCEDURE — 99232 SBSQ HOSP IP/OBS MODERATE 35: CPT | Performed by: FAMILY MEDICINE

## 2018-10-24 PROCEDURE — 25000128 H RX IP 250 OP 636: Performed by: FAMILY MEDICINE

## 2018-10-24 PROCEDURE — 83605 ASSAY OF LACTIC ACID: CPT | Performed by: FAMILY MEDICINE

## 2018-10-24 PROCEDURE — 80053 COMPREHEN METABOLIC PANEL: CPT | Performed by: FAMILY MEDICINE

## 2018-10-24 PROCEDURE — 36415 COLL VENOUS BLD VENIPUNCTURE: CPT | Performed by: FAMILY MEDICINE

## 2018-10-24 PROCEDURE — 82803 BLOOD GASES ANY COMBINATION: CPT | Performed by: FAMILY MEDICINE

## 2018-10-24 RX ADMIN — CEFAZOLIN SODIUM 2 G: 2 INJECTION, SOLUTION INTRAVENOUS at 22:18

## 2018-10-24 RX ADMIN — ACETAMINOPHEN 1000 MG: 500 TABLET ORAL at 14:24

## 2018-10-24 RX ADMIN — CEFAZOLIN SODIUM 2 G: 2 INJECTION, SOLUTION INTRAVENOUS at 03:42

## 2018-10-24 RX ADMIN — MICONAZOLE NITRATE: 2 POWDER TOPICAL at 08:25

## 2018-10-24 RX ADMIN — DESOGESTREL AND ETHINYL ESTRADIOL 1 TABLET: KIT at 08:25

## 2018-10-24 RX ADMIN — PAROXETINE HYDROCHLORIDE 30 MG: 30 TABLET, FILM COATED ORAL at 22:17

## 2018-10-24 RX ADMIN — TRIAMCINOLONE ACETONIDE: 5 CREAM TOPICAL at 08:26

## 2018-10-24 RX ADMIN — ACETAMINOPHEN 1000 MG: 500 TABLET ORAL at 22:17

## 2018-10-24 RX ADMIN — ACETAMINOPHEN 1000 MG: 500 TABLET ORAL at 08:25

## 2018-10-24 RX ADMIN — ENOXAPARIN SODIUM 40 MG: 40 INJECTION SUBCUTANEOUS at 01:20

## 2018-10-24 RX ADMIN — CEFAZOLIN SODIUM 2 G: 2 INJECTION, SOLUTION INTRAVENOUS at 08:25

## 2018-10-24 RX ADMIN — CEFAZOLIN SODIUM 2 G: 2 INJECTION, SOLUTION INTRAVENOUS at 14:24

## 2018-10-24 RX ADMIN — MICONAZOLE NITRATE: 2 POWDER TOPICAL at 22:18

## 2018-10-24 RX ADMIN — TRIAMCINOLONE ACETONIDE: 5 CREAM TOPICAL at 22:18

## 2018-10-24 ASSESSMENT — ACTIVITIES OF DAILY LIVING (ADL)
ADLS_ACUITY_SCORE: 13
ADLS_ACUITY_SCORE: 16
ADLS_ACUITY_SCORE: 16
ADLS_ACUITY_SCORE: 12
ADLS_ACUITY_SCORE: 13
ADLS_ACUITY_SCORE: 13

## 2018-10-24 NOTE — PROGRESS NOTES
[facility] Hospitalist Progress Note           Assessment and Plan:     Sepsis secondary to left lower leg cellulitis complicated by obesity and lymphedema   10/22/18 -- With the rigors, high fever, high white count and lack of appetite, she is high risk for bacteremia.  Blood cultures were done.  Ancef was started immediately in the ED, and we will continue this.  With recurrent cellulitis in the same leg strep is by far the most likely, but we will check a PCR nasal swab for MRSA and we will consider empiric change to vancomycin if that is positive.  2 liters of IV fluids given.  We will recheck lactic.  I will continue the LR overnight.   10/23/2018 -- sepsis resolved, cellulitis slowly improving.     10/24/2018 -- erythema that was VERY bright red throughout left lower extremity yesterday is starting to fade today, hasn't really regressed but improving.  Continue IV antibiotics 1 more night.       Generalized weakness  10/23/2018 --  due to cellulitis/sepsis as above.  Usually walks with a walker due to her MS at baseline.   Improving with treatment of infection as above.     10/24/2018 -- improving, patient thinks would be able to function at home by tomorrow.      Lymphedema secondary to morbid obesity.    10/22/18 -- Discussed with her the stasis dermatitis that is in the right leg and likely underlies the left leg when it is not infected.  She would benefit from lymphedema as an outpatient starting a couple days after this infection is resolved.   10/24/2018 -- no change.        Rash  10/23/2018 -- small rash on back improving, suspect just contact/irritation.  Skin fold fungal infections being treated with miconazole.     10/24/2018 -- improving.       ANRCISA.    She does not use CPAP.      MS.   No apparent flare of MS at this time.      CODE STATUS:  Full.      Prophylaxis:  Enoxaparin.      Lines  PIV       Disposition  Improving - cellulitis improved, but complicated by lymphedema, obesity and  patient's inability to see her own legs.  Still weak but nearing baseline - I think 1 more night inpatient is reasonable given much higher risk of failure than typical despite clearly starting to improve today.  Hope for discharge tomorrow to home if continues to do well.              Interval History:   Improving.  Strength better, but still feels a little more unsteady and weak than normal, maybe 90% of baseline.  Thinks she'll be fine returning home by tomorrow.  No fever or chills.  No new or worsened symptoms.    No pain             Review of Systems:    ROS: 10 point ROS neg other than the symptoms noted above in the HPI.             Medications:   Current active medications and PTA medications reviewed, see medication list for details.            Physical Exam:   Vitals were reviewed  Patient Vitals for the past 24 hrs:   BP Temp Temp src Heart Rate Resp SpO2   10/24/18 1514 137/90 98.5  F (36.9  C) Oral 82 18 97 %   10/24/18 1119 144/77 98.4  F (36.9  C) Oral 83 20 95 %   10/24/18 0857 135/60 98.6  F (37  C) Oral 106 18 97 %   10/24/18 0404 148/88 97.3  F (36.3  C) Oral 94 18 91 %   10/24/18 0047 (!) 157/92 98.2  F (36.8  C) Oral 108 20 90 %   10/23/18 2050 134/61 99.3  F (37.4  C) Oral 98 18 97 %       Temperatures:  Current - Temp: 98.5  F (36.9  C); Max - Temp  Av.4  F (36.9  C)  Min: 97.3  F (36.3  C)  Max: 99.3  F (37.4  C)  Respiration range: Resp  Av.7  Min: 18  Max: 20  Pulse range: No Data Recorded  Blood pressure range: Systolic (24hrs), Av , Min:134 , Max:157   ; Diastolic (24hrs), Av, Min:60, Max:92    Pulse oximetry range: SpO2  Av.5 %  Min: 90 %  Max: 97 %  I/O last 3 completed shifts:  In: 590 [P.O.:75; I.V.:515]  Out: 2200 [Urine:2200]    Intake/Output Summary (Last 24 hours) at 10/24/18 1553  Last data filed at 10/24/18 1000   Gross per 24 hour   Intake              590 ml   Output             2200 ml   Net            -1610 ml     EXAM:  General: awake and alert, NAD,  oriented x 3  Head: normocephalic  Neck: unremarkable, no lymphadenopathy   HEENT: oropharynx pink and moist    Heart: Regular rate and rhythm, no murmurs, rubs, or gallops  Lungs: clear to auscultation bilaterally with good air movement throughout  Abdomen: soft, non-tender, no masses or organomegaly  Extremities: stable lymphedema in lower extremities   Skin: erythema unchanged in distribution but notably lighter today, was very bright red throughout the left lower extremity yesterday, no other new skin changes.                 Data:     Results for orders placed or performed during the hospital encounter of 10/22/18 (from the past 24 hour(s))   Comprehensive metabolic panel   Result Value Ref Range    Sodium 140 133 - 144 mmol/L    Potassium 3.7 3.4 - 5.3 mmol/L    Chloride 105 94 - 109 mmol/L    Carbon Dioxide 29 20 - 32 mmol/L    Anion Gap 6 3 - 14 mmol/L    Glucose 122 (H) 70 - 99 mg/dL    Urea Nitrogen 13 7 - 30 mg/dL    Creatinine 0.77 0.52 - 1.04 mg/dL    GFR Estimate 82 >60 mL/min/1.7m2    GFR Estimate If Black >90 >60 mL/min/1.7m2    Calcium 8.4 (L) 8.5 - 10.1 mg/dL    Bilirubin Total 0.7 0.2 - 1.3 mg/dL    Albumin 2.6 (L) 3.4 - 5.0 g/dL    Protein Total 6.0 (L) 6.8 - 8.8 g/dL    Alkaline Phosphatase 50 40 - 150 U/L    ALT 26 0 - 50 U/L    AST 32 0 - 45 U/L   Lactic acid whole blood   Result Value Ref Range    Lactic Acid 1.0 0.7 - 2.0 mmol/L   CBC with platelets   Result Value Ref Range    WBC 6.9 4.0 - 11.0 10e9/L    RBC Count 4.60 3.8 - 5.2 10e12/L    Hemoglobin 12.6 11.7 - 15.7 g/dL    Hematocrit 40.6 35.0 - 47.0 %    MCV 88 78 - 100 fl    MCH 27.4 26.5 - 33.0 pg    MCHC 31.0 (L) 31.5 - 36.5 g/dL    RDW 14.7 10.0 - 15.0 %    Platelet Count 161 150 - 450 10e9/L   Blood gas venous   Result Value Ref Range    Ph Venous 7.35 7.32 - 7.43 pH    PCO2 Venous 56 (H) 40 - 50 mm Hg    PO2 Venous 29 25 - 47 mm Hg    Bicarbonate Venous 31 (H) 21 - 28 mmol/L    Base Excess Venous 3.7 mmol/L            Attestation:  I have reviewed today's vital signs, notes, medications, labs and imaging.  Amount of time performed on this daily note: 30 minutes.     Caleb Green MD, MD

## 2018-10-24 NOTE — PLAN OF CARE
Problem: Patient Care Overview  Goal: Plan of Care/Patient Progress Review  Outcome: No Change  Patient continues to have bright/angry redness on left more than right lower extremities.Patient is on antibiotics for cellulitis.kenalog cream applied to patient legs/feet. Patient has large folds on abdomin which are large miconazole powder applied in folds. Patient has red rash in folds . Patient is to bedside commode and voids small amts .Patient is steady on her feet. Patient is afebrile and on room air. Patient has call light within reach.

## 2018-10-24 NOTE — PLAN OF CARE
Problem: Skin and Soft Tissue Infection (Adult)  Goal: Signs and Symptoms of Listed Potential Problems Will be Absent, Minimized or Managed (Skin and Soft Tissue Infection)  Signs and symptoms of listed potential problems will be absent, minimized or managed by discharge/transition of care (reference Skin and Soft Tissue Infection (Adult) CPG).   Outcome: Improving  Patient alert, oriented, assist 1. Denies pain. Redness on lower left leg is within marked lines, appears to be receeding. BLLE not as warm to the touch as 24 hours prior. IV saline locked. IV antibiotics given. LS clear. Transfers self to commode. /88  Pulse 87  Temp 97.3  F (36.3  C) (Oral)  Resp 18  Wt (!) 180.5 kg (397 lb 14.9 oz)  SpO2 91%  BMI 68.09 kg/m2

## 2018-10-24 NOTE — PLAN OF CARE
Problem: Patient Care Overview  Goal: Plan of Care/Patient Progress Review  Discharge Planner PT   Patient plan for discharge: home  Current status: SBA w/ all transfers, SBA w/ FWW up to 50 feet  Barriers to return to prior living situation: low prior level, decrease functional endurance  Recommendations for discharge: home  Rationale for recommendations: Pt reports being at baseline level, SBA w/ transfers and gait, PT judgement       Entered by: Cole Alex 10/24/2018 1:35 PM     Physical Therapy Discharge Summary    Reason for therapy discharge:    Discharged to home.    Progress towards therapy goal(s). See goals on Care Plan in [facility] health record for goal details.  Goals met    Therapy recommendation(s):    Continued therapy is recommended.  Rationale/Recommendations:  to get stronger and decrease weight to prevent future hospital stay..

## 2018-10-25 VITALS
WEIGHT: 293 LBS | SYSTOLIC BLOOD PRESSURE: 139 MMHG | BODY MASS INDEX: 68.09 KG/M2 | OXYGEN SATURATION: 96 % | HEART RATE: 96 BPM | DIASTOLIC BLOOD PRESSURE: 79 MMHG | RESPIRATION RATE: 16 BRPM | TEMPERATURE: 97.8 F

## 2018-10-25 LAB
ANION GAP SERPL CALCULATED.3IONS-SCNC: 5 MMOL/L (ref 3–14)
BUN SERPL-MCNC: 15 MG/DL (ref 7–30)
CALCIUM SERPL-MCNC: 8.9 MG/DL (ref 8.5–10.1)
CHLORIDE SERPL-SCNC: 103 MMOL/L (ref 94–109)
CO2 SERPL-SCNC: 30 MMOL/L (ref 20–32)
CREAT SERPL-MCNC: 0.75 MG/DL (ref 0.52–1.04)
ERYTHROCYTE [DISTWIDTH] IN BLOOD BY AUTOMATED COUNT: 14.6 % (ref 10–15)
GFR SERPL CREATININE-BSD FRML MDRD: 84 ML/MIN/1.7M2
GLUCOSE SERPL-MCNC: 128 MG/DL (ref 70–99)
HCT VFR BLD AUTO: 40.4 % (ref 35–47)
HGB BLD-MCNC: 12.4 G/DL (ref 11.7–15.7)
MCH RBC QN AUTO: 26.9 PG (ref 26.5–33)
MCHC RBC AUTO-ENTMCNC: 30.7 G/DL (ref 31.5–36.5)
MCV RBC AUTO: 88 FL (ref 78–100)
PLATELET # BLD AUTO: 188 10E9/L (ref 150–450)
POTASSIUM SERPL-SCNC: 3.9 MMOL/L (ref 3.4–5.3)
RBC # BLD AUTO: 4.61 10E12/L (ref 3.8–5.2)
SODIUM SERPL-SCNC: 138 MMOL/L (ref 133–144)
WBC # BLD AUTO: 6.1 10E9/L (ref 4–11)

## 2018-10-25 PROCEDURE — 25000128 H RX IP 250 OP 636: Performed by: FAMILY MEDICINE

## 2018-10-25 PROCEDURE — 99238 HOSP IP/OBS DSCHRG MGMT 30/<: CPT | Performed by: FAMILY MEDICINE

## 2018-10-25 PROCEDURE — 25000128 H RX IP 250 OP 636: Performed by: EMERGENCY MEDICINE

## 2018-10-25 PROCEDURE — 85027 COMPLETE CBC AUTOMATED: CPT | Performed by: FAMILY MEDICINE

## 2018-10-25 PROCEDURE — 90686 IIV4 VACC NO PRSV 0.5 ML IM: CPT | Performed by: FAMILY MEDICINE

## 2018-10-25 PROCEDURE — 25000132 ZZH RX MED GY IP 250 OP 250 PS 637: Performed by: FAMILY MEDICINE

## 2018-10-25 PROCEDURE — 80048 BASIC METABOLIC PNL TOTAL CA: CPT | Performed by: FAMILY MEDICINE

## 2018-10-25 PROCEDURE — 36415 COLL VENOUS BLD VENIPUNCTURE: CPT | Performed by: FAMILY MEDICINE

## 2018-10-25 RX ORDER — CEPHALEXIN 500 MG/1
500 CAPSULE ORAL 3 TIMES DAILY
Qty: 10 CAPSULE | Refills: 0 | Status: SHIPPED | OUTPATIENT
Start: 2018-10-25 | End: 2018-10-31

## 2018-10-25 RX ORDER — TRIAMCINOLONE ACETONIDE 5 MG/G
CREAM TOPICAL 2 TIMES DAILY
Qty: 160 G | Refills: 1 | Status: SHIPPED | OUTPATIENT
Start: 2018-10-25 | End: 2019-02-26

## 2018-10-25 RX ADMIN — DESOGESTREL AND ETHINYL ESTRADIOL 1 TABLET: KIT at 10:19

## 2018-10-25 RX ADMIN — MICONAZOLE NITRATE: 2 POWDER TOPICAL at 08:32

## 2018-10-25 RX ADMIN — CEFAZOLIN SODIUM 2 G: 2 INJECTION, SOLUTION INTRAVENOUS at 08:31

## 2018-10-25 RX ADMIN — ENOXAPARIN SODIUM 40 MG: 40 INJECTION SUBCUTANEOUS at 00:44

## 2018-10-25 RX ADMIN — TRIAMCINOLONE ACETONIDE: 5 CREAM TOPICAL at 08:31

## 2018-10-25 RX ADMIN — ACETAMINOPHEN 1000 MG: 500 TABLET ORAL at 08:31

## 2018-10-25 RX ADMIN — CEFAZOLIN SODIUM 2 G: 2 INJECTION, SOLUTION INTRAVENOUS at 04:02

## 2018-10-25 RX ADMIN — INFLUENZA A VIRUS A/MICHIGAN/45/2015 X-275 (H1N1) ANTIGEN (FORMALDEHYDE INACTIVATED), INFLUENZA A VIRUS A/SINGAPORE/INFIMH-16-0019/2016 IVR-186 (H3N2) ANTIGEN (FORMALDEHYDE INACTIVATED), INFLUENZA B VIRUS B/PHUKET/3073/2013 ANTIGEN (FORMALDEHYDE INACTIVATED), AND INFLUENZA B VIRUS B/MARYLAND/15/2016 BX-69A ANTIGEN (FORMALDEHYDE INACTIVATED) 0.5 ML: 15; 15; 15; 15 INJECTION, SUSPENSION INTRAMUSCULAR at 10:37

## 2018-10-25 ASSESSMENT — ACTIVITIES OF DAILY LIVING (ADL)
ADLS_ACUITY_SCORE: 13
ADLS_ACUITY_SCORE: 12
ADLS_ACUITY_SCORE: 13

## 2018-10-25 NOTE — PLAN OF CARE
Problem: Skin and Soft Tissue Infection (Adult)  Goal: Signs and Symptoms of Listed Potential Problems Will be Absent, Minimized or Managed (Skin and Soft Tissue Infection)  Signs and symptoms of listed potential problems will be absent, minimized or managed by discharge/transition of care (reference Skin and Soft Tissue Infection (Adult) CPG).   Outcome: Therapy, progress toward functional goals as expected  Redness remains within borders.  Denies discomfort.

## 2018-10-25 NOTE — PHARMACY - DISCHARGE MEDICATION RECONCILIATION
Discharge medication review for this patient is complete. Pharmacist assisted with medication reconciliation of discharge medications with prior to admission medications.     The following changes were made to the discharge medication list based on pharmacist review:  Added:  Keflex, triamconolone cream  Discontinued: NA  Changed: NA      Patient's Discharge Medication List  - medications as listed on After Visit Summary (AVS)     Review of your medicines      START taking       Dose / Directions    cephALEXin 500 MG capsule   Commonly known as:  KEFLEX   Used for:  Cellulitis of left leg        Dose:  500 mg   Take 1 capsule (500 mg) by mouth 3 times daily   Quantity:  10 capsule   Refills:  0       triamcinolone 0.5 % cream   Commonly known as:  KENALOG   Used for:  Stasis dermatitis of both legs        Apply topically 2 times daily   Quantity:  160 g   Refills:  1         CONTINUE these medicines which may have CHANGED, or have new prescriptions. If we are uncertain of the size of tablets/capsules you have at home, strength may be listed as something that might have changed.       Dose / Directions    PARoxetine 30 MG tablet   Commonly known as:  PAXIL   This may have changed:  when to take this   Used for:  Anxiety        Dose:  30 mg   Take 1 tablet (30 mg) by mouth every morning   Quantity:  90 tablet   Refills:  3         CONTINUE these medicines which have NOT CHANGED       Dose / Directions    acetaminophen 500 MG tablet   Commonly known as:  TYLENOL        Dose:  500-1000 mg   Take 500-1,000 mg by mouth once as needed for mild pain   Refills:  0       desogestrel-ethinyl estradiol 0.15-30 MG-MCG per tablet   Commonly known as:  APRI   Used for:  Polycystic ovaries, Encounter for surveillance of contraceptive pills        Dose:  1 tablet   Take 1 tablet by mouth daily Take as continuous daily dosing (omit placebo pills until after three months of hormone tablets.)   Quantity:  112 tablet   Refills:  1        ibuprofen 200 MG tablet   Commonly known as:  ADVIL/MOTRIN        Dose:  600 mg   Take 600 mg by mouth every 6 hours as needed for mild pain   Refills:  0       OCREVUS IV        Refills:  0       order for DME        Equipment ordered: RESMED CPAP Mask type: Nasal Settings: 10 CM H2O   Refills:  0       VITAMIN D3 PO        Dose:  01035 Units   Take 10,000 Units by mouth daily   Refills:  0            Where to get your medicines      These medications were sent to [place] [id number] [place] [address] [address]     Phone:  [phone]      cephALEXin 500 MG capsule     triamcinolone 0.5 % cream

## 2018-10-25 NOTE — DISCHARGE INSTRUCTIONS
For the cellulitis  -keflex 500 mg 3 times/day for 3 more days  -would  follow up in clinic in the next week      For the lymphedema  - follow up with Lymphedema clinic   -triamcinolone cream 2 times/day

## 2018-10-25 NOTE — DISCHARGE SUMMARY
[facility] Hospitalist Discharge Summary    Bess Enamorado MRN# [id number]   Age: 42 year old YOB: 1975     Date of Admission:  10/22/2018  Date of Discharge::  10/25/2018 11:52 AM  Admitting Physician:  Frank Wallace MD  Discharge Physician:  Frank Wallace MD  Primary Physician: Jennifer Crowe       Home clinic: [facility]               Discharge Diagnosis:   Principle diagnosis: sepsis due to cellulitis     Secondary diagnoses:  elevated bili due to sepsis   Stasis dermatitis  Chronic Lymphedema due to obesity  MS       Discharge Instructions:   For the cellulitis  -keflex 500 mg 3 times/day for 3 more days  -would  follow up in clinic in the next week      For the lymphedema  - follow up with Lymphedema clinic   -triamcinolone cream 2 times/day       Follow up with primary care provider in 7 days        Procedures:       Results for orders placed or performed during the hospital encounter of 10/22/18   XR Chest 2 Views    Narrative    CHEST TWO VIEWS  10/22/2018 8:16 PM     HISTORY: Cough with fever;     COMPARISON: None.      Impression    IMPRESSION: Normal.    WAYNE BLACKMON MD   CT Abdomen Pelvis w Contrast    Narrative    CT ABDOMEN AND PELVIS WITH CONTRAST 10/22/2018 10:42 PM    HISTORY: Evaluate for Tiffany's gangrene.    TECHNIQUE: Helical axial scans from dome of liver through pubic  symphysis with 100 mL Isovue -370 IV contrast. Radiation dose for this  scan was reduced using automated exposure control, adjustment of the  mA and/or kV according to patient size, or iterative reconstruction  technique.    COMPARISON: None.    FINDINGS: Moderate diffuse fatty infiltration of the liver. Prior  cholecystectomy. The spleen, pancreas, bilateral adrenal glands and  kidneys bilaterally are unremarkable. The bowel and mesentery in the  upper abdomen appear normal.    Scans through the pelvis show a left-sided pelvic mass measuring 10.1  x 8.6 x 7.7 cm. This is mostly  composed of benign-appearing fat but  also shows a block of calcification as well as amorphus nonfatty  components. The findings are consistent with a benign dermoid. No  acute-appearing abnormality or free fluid in the pelvis. The appendix  is not definitely identified but there is no CT evidence for  appendicitis.    There is some mild induration of subcutaneous fat anteriorly in the  lower abdomen. There is no evidence for inflammatory change, soft  tissue gas or abnormal fluid collections in the peroneal region to  indicate cellulitis/infection in this area. Morbid obesity is noted.      Impression    IMPRESSION:  1. Fatty liver and prior cholecystectomy.  2. No acute-appearing intra-abdominal or pelvic abnormality.  3. Large benign ovarian dermoid on the left.  4. Nonspecific mild induration subcutaneous fat lower anterior  abdominal wall. There is no evidence for soft tissue gas or fluid  collections in this region and there are no changes of soft tissue  inflammation in the perineal region. In particular, there is no  evidence for Tiffany's gangrene.    CEDRICK DIXON MD                    Allergies:      Allergies   Allergen Reactions     Nkda [No Known Drug Allergies]                   Discharge Medications:     Current Discharge Medication List      START taking these medications    Details   cephALEXin (KEFLEX) 500 MG capsule Take 1 capsule (500 mg) by mouth 3 times daily  Qty: 10 capsule, Refills: 0    Associated Diagnoses: Cellulitis of left leg         CONTINUE these medications which have NOT CHANGED    Details   acetaminophen (TYLENOL) 500 MG tablet Take 500-1,000 mg by mouth once as needed for mild pain      Cholecalciferol (VITAMIN D3 PO) Take 10,000 Units by mouth daily       desogestrel-ethinyl estradiol (APRI) 0.15-30 MG-MCG per tablet Take 1 tablet by mouth daily Take as continuous daily dosing (omit placebo pills until after three months of hormone tablets.)  Qty: 112 tablet, Refills: 1     Associated Diagnoses: Polycystic ovaries; Encounter for surveillance of contraceptive pills      ibuprofen (ADVIL/MOTRIN) 200 MG tablet Take 600 mg by mouth every 6 hours as needed for mild pain      order for DME Equipment ordered: RESMED CPAP Mask type: Nasal Settings: 10 CM H2O      PARoxetine (PAXIL) 30 MG tablet Take 1 tablet (30 mg) by mouth every morning  Qty: 90 tablet, Refills: 3    Associated Diagnoses: Anxiety      Ocrelizumab (OCREVUS IV)                    Consultations:   None           Brief History of Presenting Illness:   The patient complains of fever starting this morning with rigors, chills, recurrent throughout the morning and then finally stopped after she took some Tylenol this afternoon.  A friend came over and saw that her leg was very red and insisted she come in.         She admits to some increased weakness over the last couple of days, possibly even a week.  She was put on a steroid burst IV x 3 days for a possible MS flare 2 weeks ago.       She has a history of cellulitis in that leg at least on 2 separate occasions, but has never had fevers like this and has never had to be admitted.       In the ED there was some concern that she also had erythema of her pannus in the abdomen but no tenderness.  A CT scan was done to rule out Tiffany's disease which was negative.           Hospital Course:   Sepsis secondary to left lower leg cellulitis complicated by obesity and lymphedema   10/22/18 -- With the rigors, high fever, high white count and lack of appetite, she is high risk for bacteremia.  Blood cultures were done.  Ancef was started immediately in the ED, and we will continue this.  With recurrent cellulitis in the same leg strep is by far the most likely, but we will check a PCR nasal swab for MRSA and we will consider empiric change to vancomycin if that is positive.  2 liters of IV fluids given.  We will recheck lactic.  I will continue the LR overnight.    -- sepsis resolved,  cellulitis slowly improving.        Generalized weakness   --  due to cellulitis/sepsis as above.  Usually walks with a walker due to her MS at baseline.   Improving with treatment of infection as above.        Lymphedema secondary to morbid obesity.     -- Discussed with her the stasis dermatitis that is in the right leg and likely underlies the left leg when it is not infected.  She would benefit from lymphedema as an outpatient starting a couple days after this infection is resolved.           Rash   -- small rash on back improving, suspect just contact/irritation.  Skin fold fungal infections being treated with miconazole.        NARCISA.    She does not use CPAP.      MS.   No apparent flare of MS at this time.      CODE STATUS:  Full.      Prophylaxis:  Enoxaparin.             Discharge Exam:   OBJECTIVE:   /79 (BP Location: Right arm)  Pulse 96  Temp 97.8  F (36.6  C) (Oral)  Resp 16  Wt (!) 180.5 kg (397 lb 14.9 oz)  SpO2 96%  BMI 68.09 kg/m2    GENERAL APPEARANCE:  Alert, NAD, Ox3     RESP:clear      CV: regular rates and rhythm,no murmur, no click or rub - no edema     Abdomen: soft, nontender, no liver or spleen enlargement, no masses, BSs normal   Skin: no cyanosis, pallor, or jaundice   -still erythema of both LEs, but less on the left than on admission.  The abd pannus is less red. No tenderness anywhere anymore            Pending Tests at Discharge:     Unresulted Labs Ordered in the Past 30 Days of this Admission     Date and Time Order Name Status Description    10/22/2018 1850 Blood culture Preliminary     10/22/2018 1850 Blood culture Preliminary                    Discharge Disposition:   Discharged to home      Attestation:  Amount of time performed on this discharge : 30 minutes.    Frank Wallace MD

## 2018-10-25 NOTE — PLAN OF CARE
Problem: Patient Care Overview  Goal: Plan of Care/Patient Progress Review  Alert and oriented. VSS. Up independently to bedside commode, any other transfers calls for assistance.  Leg redness w/in marked borders.  Denies discomfort.  Blisters present on lower back intact and open to air.  /90 (BP Location: Right arm)  Pulse 87  Temp 98.5  F (36.9  C) (Oral)  Resp 18  Wt (!) 180.5 kg (397 lb 14.9 oz)  SpO2 97%  BMI 68.09 kg/m2

## 2018-10-25 NOTE — PLAN OF CARE
Problem: Patient Care Overview  Goal: Plan of Care/Patient Progress Review  Outcome: Improving  Pt denies pain. Redness on LLE within markings. Blisters on lower back intact. Ancef administered per MAR. VSS.

## 2018-10-25 NOTE — PROGRESS NOTES
[facility] DISCHARGE NOTE  IV catheter dc'd with catheter fully intact upon removal, immediate direct pressure applied with 2x2 gauze. No active bleeding noted, 2x2 gauze pressure dressing applied. Patient instructed to remove dressing in 2 hours. No redness, tenderness or swelling noted around site area    Patient discharged to home at 11:52 AM via wheel chair. Accompanied by mother and staff. Discharge instructions reviewed with patient, opportunity offered to ask questions. Prescriptions sent to patients preferred pharmacy. All belongings sent with patient.    Ernesto Iglesias RN

## 2018-10-25 NOTE — CONSULTS
Care Transitions:  This writer met with the patient regarding her potential need for home care. 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 Patient had questions about lymphedema wraps and what clinic provides these services. This writer consulted with the hospital therapy department and was informed that this service is only available at the [facility] [place] therapy center, updated the patient with this information. Patient in agreement with her ability to care for herself and to receive OT therapy in the clinic setting.    Chart reviewed and the patient was discussed in Interdisciplinary Rounds.  There are no discharge needs anticipated.  Care Management will continue to monitor through Interdisciplinary Rounds and intervene if needed.  If immediate needs arise, please contact Care Management at [phone].      Roseanna Escalera RN Care Coordinator  [facility] [phone]  [facility] [phone]

## 2018-10-26 ENCOUNTER — TELEPHONE (OUTPATIENT)
Dept: FAMILY MEDICINE | Facility: CLINIC | Age: 43
End: 2018-10-26

## 2018-10-26 NOTE — TELEPHONE ENCOUNTER
ED/UC/IP follow up phone call:   10/25/18 Stasis Dermatitis of Both Legs; Cellulitis of Left Leg    RN please call to follow up    Number of ED visits in past 12 mths:   0    [facility] [place]

## 2018-10-26 NOTE — TELEPHONE ENCOUNTER
"Date of Admission:                                      10/22/2018  Date of Discharge::                                     10/25/2018 11:52 AM       Discharge Diagnosis:   Principle diagnosis: sepsis due to cellulitis     Secondary diagnoses:  elevated bili due to sepsis   Stasis dermatitis  Chronic Lymphedema due to obesity  MS    Discharge Instructions:   For the cellulitis  -keflex 500 mg 3 times/day for 3 more days  -would  follow up in clinic in the next week     For the lymphedema  - follow up with Lymphedema clinic   -triamcinolone cream 2 times/day         Patient is scheduled with Dr. Epstein 10/31/18 for ED/IP follow up.    ED/Discharge Protocol    \"Hi, my name is Thalia Kline, a registered nurse, and I am calling on behalf of Dr. Epstein's office at [place].  I am calling to follow up and see how things are going for you after your recent visit.\"    \"I see that you were in the (ER/UC/IP) on 10/22/18-10/25/18.    How are you doing now that you are home?\" Doing just fine    Is patient experiencing symptoms that may require a hospital visit?  None    Discharge Instructions    \"Let's review your discharge instructions.  What is/are the follow-up recommendations?  Pt. Response: Appointment in clinic    \"Were you instructed to make a follow-up appointment?\"  Pt. Response: Yes.  Has appointment been made?   Yes      \"When you see the provider, I would recommend that you bring your discharge instructions with you.    Medications    \"How many new medications are you on since your hospitalization/ED visit?\"    0-1  \"How many of your current medicines changed (dose, timing, name, etc.) while you were in the hospital/ED visit?\"   0-1  \"Do you have questions about your medications?\"   No  \"Were you newly diagnosed with heart failure, COPD, diabetes or did you have a heart attack?\"   No  For patients on insulin: \"Did you start on insulin in the hospital or did you have your insulin dose " "changed?\"   No    Medication reconciliation completed? Yes    Was MTM referral placed (*Make sure to put transitions as reason for referral)?   No    Call Summary    \"Do you have any questions or concerns about your condition or care plan at the moment?\"    No  Triage nurse advice given: Continue to monitor, call if problems arise. Keep appointment with Dr. Epstein    Patient was in ER 1 in the past year (assess appropriateness of ER visits.)      \"If you have questions or things don't continue to improve, we encourage you contact us through the main clinic number,  [phone].  Even if the clinic is not open, triage nurses are available 24/7 to help you.     We would like you to know that our clinic has extended hours (provide information).  We also have urgent care (provide details on closest location and hours/contact info)\"      \"Thank you for your time and take care!\"        "

## 2018-10-28 LAB
BACTERIA SPEC CULT: NO GROWTH
BACTERIA SPEC CULT: NO GROWTH
Lab: NORMAL
Lab: NORMAL
SPECIMEN SOURCE: NORMAL
SPECIMEN SOURCE: NORMAL

## 2018-10-31 ENCOUNTER — OFFICE VISIT (OUTPATIENT)
Dept: FAMILY MEDICINE | Facility: CLINIC | Age: 43
End: 2018-10-31
Payer: COMMERCIAL

## 2018-10-31 VITALS
BODY MASS INDEX: 51.91 KG/M2 | DIASTOLIC BLOOD PRESSURE: 82 MMHG | SYSTOLIC BLOOD PRESSURE: 124 MMHG | WEIGHT: 293 LBS | HEART RATE: 100 BPM | TEMPERATURE: 98.6 F | OXYGEN SATURATION: 96 % | HEIGHT: 63 IN

## 2018-10-31 DIAGNOSIS — I89.0 LYMPHEDEMA OF BOTH LOWER EXTREMITIES: Primary | ICD-10-CM

## 2018-10-31 PROCEDURE — 99495 TRANSJ CARE MGMT MOD F2F 14D: CPT | Performed by: FAMILY MEDICINE

## 2018-10-31 ASSESSMENT — PATIENT HEALTH QUESTIONNAIRE - PHQ9
SUM OF ALL RESPONSES TO PHQ QUESTIONS 1-9: 4
5. POOR APPETITE OR OVEREATING: NOT AT ALL

## 2018-10-31 ASSESSMENT — ANXIETY QUESTIONNAIRES
6. BECOMING EASILY ANNOYED OR IRRITABLE: SEVERAL DAYS
5. BEING SO RESTLESS THAT IT IS HARD TO SIT STILL: NOT AT ALL
3. WORRYING TOO MUCH ABOUT DIFFERENT THINGS: NOT AT ALL
1. FEELING NERVOUS, ANXIOUS, OR ON EDGE: NOT AT ALL
GAD7 TOTAL SCORE: 1
2. NOT BEING ABLE TO STOP OR CONTROL WORRYING: NOT AT ALL
7. FEELING AFRAID AS IF SOMETHING AWFUL MIGHT HAPPEN: NOT AT ALL
IF YOU CHECKED OFF ANY PROBLEMS ON THIS QUESTIONNAIRE, HOW DIFFICULT HAVE THESE PROBLEMS MADE IT FOR YOU TO DO YOUR WORK, TAKE CARE OF THINGS AT HOME, OR GET ALONG WITH OTHER PEOPLE: NOT DIFFICULT AT ALL

## 2018-10-31 NOTE — MR AVS SNAPSHOT
After Visit Summary   10/31/2018    Bess Enamorado    MRN: [id number]           Patient Information     Date Of Birth          1975        Visit Information        Provider Department      10/31/2018 11:00 AM Elton Epstein MD [facility]        Today's Diagnoses     Lymphedema of both lower extremities    -  1      Care Instructions    Physical therapy lymphedema will call you to schedule again or call them back.          Thank you for choosing [facility].  You may be receiving a survey in the mail from Amy Masters regarding your visit today.  Please take a few minutes to complete and return the survey to let us know how we are doing.      If you have questions or concerns, please contact us via Korem or you can contact your care team at [phone].    Our Clinic hours are:  Monday 6:40 am  to 7:00 pm  Tuesday -Friday 6:40 am to 5:00 pm    The [place] outpatient lab hours are:  Monday - Friday 6:10 am to 4:45 pm  Saturdays 7:00 am to 11:00 am  Appointments are required, call [phone]    If you have clinical questions after hours or would like to schedule an appointment,  call the clinic at [phone].          Follow-ups after your visit        Additional Services     LYMPHEDEMA THERAPY REFERRAL       If you have not heard from the scheduling office within 2 business days, please call [phone] for all locations, with the exception of [place], please call [phone] and [place], please call [phone].    Please be aware that coverage of these services is subject to the terms and limitations of your health insurance plan.  Call member services at your health plan with any benefit or coverage questions.                  Future tests that were ordered for you today     Open Future Orders        Priority Expected Expires Ordered    LYMPHEDEMA THERAPY REFERRAL Routine  10/31/2019 10/31/2018            Who to contact     If you have questions or need follow  "up information about today's clinic visit or your schedule please contact [facility] directly at [phone].  Normal or non-critical lab and imaging results will be communicated to you by bunkersofahart, letter or phone within 4 business days after the clinic has received the results. If you do not hear from us within 7 days, please contact the clinic through [employer]t or phone. If you have a critical or abnormal lab result, we will notify you by phone as soon as possible.  Submit refill requests through DriveK or call your pharmacy and they will forward the refill request to us. Please allow 3 business days for your refill to be completed.          Additional Information About Your Visit        bunkersofaharCO-Value Information     DriveK gives you secure access to your electronic health record. If you see a primary care provider, you can also send messages to your care team and make appointments. If you have questions, please call your primary care clinic.  If you do not have a primary care provider, please call [phone] and they will assist you.        Care EveryWhere ID     This is your Care EveryWhere ID. This could be used by other organizations to access your [facility] medical records  [id number]        Your Vitals Were     Pulse Temperature Height Last Period Pulse Oximetry BMI (Body Mass Index)    100 98.6  F (37  C) (Tympanic) 5' 3\" (1.6 m) 10/01/2018 (Approximate) 96% 66.75 kg/m2       Blood Pressure from Last 3 Encounters:   10/31/18 124/82   10/25/18 139/79   08/14/18 (!) 158/91    Weight from Last 3 Encounters:   10/31/18 (!) 376 lb 12.8 oz (170.9 kg)   10/23/18 (!) 397 lb 14.9 oz (180.5 kg)   03/23/18 (!) 371 lb (168.3 kg)              We Performed the Following     DEPRESSION ACTION PLAN (DAP)          Today's Medication Changes          These changes are accurate as of 10/31/18 11:39 AM.  If you have any questions, ask your nurse or doctor.               These medicines have changed or have updated " prescriptions.        Dose/Directions    PARoxetine 30 MG tablet   Commonly known as:  PAXIL   This may have changed:  when to take this   Used for:  Anxiety        Dose:  30 mg   Take 1 tablet (30 mg) by mouth every morning   Quantity:  90 tablet   Refills:  3                Primary Care Provider Office Phone # Fax #    Elton Epstein [phone] [address] [facility] [address]        Equal Access to Services     CARIE DUNCAN : Hadteri norman hadasho Soomaali, waaxda luqadaha, qaybta kaalmada adeegyada, gerry moeller hayivanna lorenzyuejackson sarkar . So [facility] [phone].    ATENCIÓN: Si habla español, tiene a camacho disposición servicios gratuitos de asistencia lingüística. Arnulfo al [phone].    We comply with applicable federal civil rights laws and [place] laws. We do not discriminate on the basis of race, color, national origin, age, disability, sex, sexual orientation, or gender identity.            Thank you!     Thank you for choosing [facility]  for your care. Our goal is always to provide you with excellent care. Hearing back from our patients is one way we can continue to improve our services. Please take a few minutes to complete the written survey that you may receive in the mail after your visit with us. Thank you!             Your Updated Medication List - Protect others around you: Learn how to safely use, store and throw away your medicines at www.disposemymeds.org.          This list is accurate as of 10/31/18 11:39 AM.  Always use your most recent med list.                   Brand Name Dispense Instructions for use Diagnosis    acetaminophen 500 MG tablet    TYLENOL     Take 500-1,000 mg by mouth once as needed for mild pain        desogestrel-ethinyl estradiol 0.15-30 MG-MCG per tablet    APRI    112 tablet    Take 1 tablet by mouth daily Take as continuous daily dosing (omit placebo pills until after three months of hormone tablets.)    Polycystic  ovaries, Encounter for surveillance of contraceptive pills       ibuprofen 200 MG tablet    ADVIL/MOTRIN     Take 600 mg by mouth every 6 hours as needed for mild pain        OCREVUS IV           order for DME      Equipment ordered: RESMED CPAP Mask type: Nasal Settings: 10 CM H2O        PARoxetine 30 MG tablet    PAXIL    90 tablet    Take 1 tablet (30 mg) by mouth every morning    Anxiety       triamcinolone 0.5 % cream    KENALOG    160 g    Apply topically 2 times daily    Stasis dermatitis of both legs       VITAMIN D3 PO      Take 10,000 Units by mouth daily

## 2018-10-31 NOTE — PATIENT INSTRUCTIONS
Physical therapy lymphedema will call you to schedule again or call them back.          Thank you for choosing [facility].  You may be receiving a survey in the mail from Amy Masters regarding your visit today.  Please take a few minutes to complete and return the survey to let us know how we are doing.      If you have questions or concerns, please contact us via Acumen or you can contact your care team at [phone].    Our Clinic hours are:  Monday 6:40 am  to 7:00 pm  Tuesday -Friday 6:40 am to 5:00 pm    The [place] outpatient lab hours are:  Monday - Friday 6:10 am to 4:45 pm  Saturdays 7:00 am to 11:00 am  Appointments are required, call [phone]    If you have clinical questions after hours or would like to schedule an appointment,  call the clinic at [phone].

## 2018-10-31 NOTE — PROGRESS NOTES
SUBJECTIVE:   Bess Enamorado is a 42 year old female who presents to clinic today for the following health issues:          Hospital Follow-up Visit:    Hospital/Nursing Home/IP Rehab Facility: [facility]  Date of Admission: 10/22/18  Date of Discharge: 10/25/18  Reason(s) for Admission: cellulitis of abdomen and left leg            Problems taking medications regularly:  None       Medication changes since discharge: None       Problems adhering to non-medication therapy:  None    Summary of hospitalization:  [facility] discharge summary reviewed  Diagnostic Tests/Treatments reviewed.  Follow up needed: none  Other Healthcare Providers Involved in Patient s Care:         None  Update since discharge: improved.     Post Discharge Medication Reconciliation: discharge medications reconciled, continue medications without change.  Plan of care communicated with patient     Coding guidelines for this visit:  Type of Medical   Decision Making Face-to-Face Visit       within 7 Days of discharge Face-to-Face Visit        within 14 days of discharge   Moderate Complexity 99121 05152   High Complexity 94715 11436        Copied from hospital discharge below        Hospital Course:   Sepsis secondary to left lower leg cellulitis complicated by obesity and lymphedema   10/22/18 -- With the rigors, high fever, high white count and lack of appetite, she is high risk for bacteremia.  Blood cultures were done.  Ancef was started immediately in the ED, and we will continue this.  With recurrent cellulitis in the same leg strep is by far the most likely, but we will check a PCR nasal swab for MRSA and we will consider empiric change to vancomycin if that is positive.  2 liters of IV fluids given.  We will recheck lactic.  I will continue the LR overnight.    -- sepsis resolved, cellulitis slowly improving.         Generalized weakness   --  due to cellulitis/sepsis as above.  Usually walks with a walker due to her  MS at baseline.   Improving with treatment of infection as above.         Lymphedema secondary to morbid obesity.     -- Discussed with her the stasis dermatitis that is in the right leg and likely underlies the left leg when it is not infected.  She would benefit from lymphedema as an outpatient starting a couple days after this infection is resolved.          Rash   -- small rash on back improving, suspect just contact/irritation.  Skin fold fungal infections being treated with miconazole.         NARCISA.    She does not use CPAP.       MS.   No apparent flare of MS at this time.     Current Status:   Left lower leg healed cellulitis, not red or hot. Finished antibiotics, had some looser stools but not other side effects.    On the lower back seems to be getting better, a little itchy. Not painful.     Was on pulse IV steroids few weeks ago due to MS flare which did help with the fatigue.  Strength is improved after treating MS flare and cellulitis.  Reviewing blood sugars they were a little higher likely from the steroid.    Did the [facility] this summer and seeing therapist weekly.    Problem list and histories reviewed & adjusted, as indicated.  Additional history: as documented    BP Readings from Last 3 Encounters:   10/31/18 (!) 122/92   10/25/18 139/79   08/14/18 (!) 158/91    Wt Readings from Last 3 Encounters:   10/31/18 (!) 376 lb 12.8 oz (170.9 kg)   10/23/18 (!) 397 lb 14.9 oz (180.5 kg)   03/23/18 (!) 371 lb (168.3 kg)                    Reviewed and updated as needed this visit by clinical staff  Tobacco  Allergies  Meds  Med Hx  Surg Hx  Fam Hx  Soc Hx      Reviewed and updated as needed this visit by Provider         ROS:  CONSTITUTIONAL: NEGATIVE for fever, chills, change in weight  INTEGUMENTARY/SKIN: NEGATIVE for new worrisome rashes, moles or lesions  RESP: NEGATIVE for significant cough or SOB  CV: NEGATIVE for chest pain, palpitations or peripheral edema  : up every hour at night to  "urinate so seeing urology.    OBJECTIVE:     /82  Pulse 100  Temp 98.6  F (37  C) (Tympanic)  Ht 5' 3\" (1.6 m)  Wt (!) 376 lb 12.8 oz (170.9 kg)  LMP 10/01/2018 (Approximate)  SpO2 96%  BMI 66.75 kg/m2  Body mass index is 66.75 kg/(m^2).  GENERAL: healthy, alert and no distress  SKIN: Lower back healing bullae/blisters, no new lesions.  Left leg no redness or hot.  2+ pitting edema on both right and left leg.    Diagnostic Test Results:  none   Reviewed blood cultures negative  ASSESSMENT/PLAN:       Bess was seen today for hospital f/u.    Diagnoses and all orders for this visit:    Lymphedema of both lower extremities: would greatly benefit from getting fluid out and long term wraps to wear daily.  -don't want to add diuretic due to the urinary issues, will be seeing urologist.  -     LYMPHEDEMA THERAPY REFERRAL; Future    Other orders  -     DEPRESSION ACTION PLAN (DAP)        Patient Instructions   Physical therapy lymphedema will call you to schedule again or call them back.          Thank you for choosing [facility].  You may be receiving a survey in the mail from [employer] regarding your visit today.  Please take a few minutes to complete and return the survey to let us know how we are doing.      If you have questions or concerns, please contact us via Embrace or you can contact your care team at [phone].    Our Clinic hours are:  Monday 6:40 am  to 7:00 pm  Tuesday -Friday 6:40 am to 5:00 pm    The [place] outpatient lab hours are:  Monday - Friday 6:10 am to 4:45 pm  Saturdays 7:00 am to 11:00 am  Appointments are required, call [phone]    If you have clinical questions after hours or would like to schedule an appointment,  call the clinic at [phone].      Elton Epstein MD  [facility]  "

## 2018-10-31 NOTE — LETTER
My Depression Action Plan  Name: Bess Enamorado   Date of Birth 1975  Date: 10/31/2018    My doctor: Elton Epstein   My clinic: [facility]  [address] [place] [address] [phone]          GREEN    ZONE   Good Control    What it looks like:     Things are going generally well. You have normal up s and down s. You may even feel depressed from time to time, but bad moods usually last less than a day.   What you need to do:  1. Continue to care for yourself (see self care plan)  2. Check your depression survival kit and update it as needed  3. Follow your physician s recommendations including any medication.  4. Do not stop taking medication unless you consult with your physician first.           YELLOW         ZONE Getting Worse    What it looks like:     Depression is starting to interfere with your life.     It may be hard to get out of bed; you may be starting to isolate yourself from others.    Symptoms of depression are starting to last most all day and this has happened for several days.     You may have suicidal thoughts but they are not constant.   What you need to do:     1. Call your care team, your response to treatment will improve if you keep your care team informed of your progress. Yellow periods are signs an adjustment may need to be made.     2. Continue your self-care, even if you have to fake it!    3. Talk to someone in your support network    4. Open up your depression survival kit           RED    ZONE Medical Alert - Get Help    What it looks like:     Depression is seriously interfering with your life.     You may experience these or other symptoms: You can t get out of bed most days, can t work or engage in other necessary activities, you have trouble taking care of basic hygiene, or basic responsibilities, thoughts of suicide or death that will not go away, self-injurious behavior.     What you need to do:  1. Call your care team and  request a same-day appointment. If they are not available (weekends or after hours) call your local crisis line, emergency room or 911.            Depression Self Care Plan / Survival Kit    Self-Care for Depression  Here s the deal. Your body and mind are really not as separate as most people think.  What you do and think affects how you feel and how you feel influences what you do and think. This means if you do things that people who feel good do, it will help you feel better.  Sometimes this is all it takes.  There is also a place for medication and therapy depending on how severe your depression is, so be sure to consult with your medical provider and/ or Behavioral Health Consultant if your symptoms are worsening or not improving.     In order to better manage my stress, I will:    Exercise  Get some form of exercise, every day. This will help reduce pain and release endorphins, the  feel good  chemicals in your brain. This is almost as good as taking antidepressants!  This is not the same as joining a gym and then never going! (they count on that by the way ) It can be as simple as just going for a walk or doing some gardening, anything that will get you moving.      Hygiene   Maintain good hygiene (Get out of bed in the morning, Make your bed, Brush your teeth, Take a shower, and Get dressed like you were going to work, even if you are unemployed).  If your clothes don't fit try to get ones that do.    Diet  I will strive to eat foods that are good for me, drink plenty of water, and avoid excessive sugar, caffeine, alcohol, and other mood-altering substances.  Some foods that are helpful in depression are: complex carbohydrates, B vitamins, flaxseed, fish or fish oil, fresh fruits and vegetables.    Psychotherapy  I agree to participate in Individual Therapy (if recommended).    Medication  If prescribed medications, I agree to take them.  Missing doses can result in serious side effects.  I understand that  drinking alcohol, or other illicit drug use, may cause potential side effects.  I will not stop my medication abruptly without first discussing it with my provider.    Staying Connected With Others  I will stay in touch with my friends, family members, and my primary care provider/team.    Use your imagination  Be creative.  We all have a creative side; it doesn t matter if it s oil painting, sand castles, or mud pies! This will also kick up the endorphins.    Witness Beauty  (AKA stop and smell the roses) Take a look outside, even in mid-winter. Notice colors, textures. Watch the squirrels and birds.     Service to others  Be of service to others.  There is always someone else in need.  By helping others we can  get out of ourselves  and remember the really important things.  This also provides opportunities for practicing all the other parts of the program.    Humor  Laugh and be silly!  Adjust your TV habits for less news and crime-drama and more comedy.    Control your stress  Try breathing deep, massage therapy, biofeedback, and meditation. Find time to relax each day.     My support system    Clinic Contact:  Phone number:    Contact 1:  Phone number:    Contact 2:  Phone number:    Hindu/:  Phone number:    Therapist:  Phone number:    Local crisis center:    Phone number:    Other community support:  Phone number:

## 2018-11-01 ASSESSMENT — ANXIETY QUESTIONNAIRES: GAD7 TOTAL SCORE: 1

## 2018-11-07 ENCOUNTER — HOSPITAL ENCOUNTER (OUTPATIENT)
Dept: PHYSICAL THERAPY | Facility: CLINIC | Age: 43
Setting detail: THERAPIES SERIES
End: 2018-11-07
Attending: FAMILY MEDICINE
Payer: COMMERCIAL

## 2018-11-07 DIAGNOSIS — I89.0 LYMPHEDEMA OF BOTH LOWER EXTREMITIES: ICD-10-CM

## 2018-11-07 PROCEDURE — 97161 PT EVAL LOW COMPLEX 20 MIN: CPT | Mod: GP | Performed by: PHYSICAL THERAPIST

## 2018-11-07 PROCEDURE — 97140 MANUAL THERAPY 1/> REGIONS: CPT | Mod: GP | Performed by: PHYSICAL THERAPIST

## 2018-11-07 PROCEDURE — 40000099 ZZH STATISTIC LYMPHEDEMA VISIT: Performed by: PHYSICAL THERAPIST

## 2018-11-07 NOTE — PROGRESS NOTES
Outpatient Lymphedema Therapy Evaluation     11/07/18 1000   Rehab Discipline   Discipline PT   Type of Visit   Type of visit Initial Edema Evaluation       present No   General Information   Start of care 11/07/18   Referring physician Dr. David Epstein MD   Orders Evaluate and treat as indicated   Order date 10/31/18   Medical diagnosis BLE secondary lymphedema   Edema onset (chronic, for years)   Affected body parts RLE;LLE   Edema etiology comments hospitalized at [facility] 10/22/18 - 10/25/18 for sepsis due to LLE cellulitis; BLE stasis dermatitis, chronic lymphedema due to obesity also with PMH of LLE cellulitis x2 other episodes   Pertinent history of current problem (PT: include personal factors and/or comorbidities that impact the POC; OT: include additional occupational profile info) has triamcinolone cream for 2x/day use; not currently on antibiotics; elevation seems to help swelling has to lay in bed otherwise MS seems to flair and legs spasm; torn ligament in L-ankle since 2015 (non-surgical unless pt loses a significant amount of weight)   Surgical / medical history reviewed Yes   Prior level of functional mobility walker at baseline for MS; fatigue issues due to MS so can't walk or stand for too long; repotrs experiencing more muscle weakness lately   Prior treatment Elevation  (but has to lay in bed, can't do it in a chair)   Patient role / employment history Employed  (mostly sitting during the day, legs in dependent position)   Living environment comments lives alone   Current assistive devices Walker   Fall Risk Screen   Fall screen completed by PT   Have you fallen 2 or more times in the past year? No   Have you fallen and had an injury in the past year? No   Is patient a fall risk? No   System Outcome Measures   Outcome Measures Lymphedema   Lymphedema Life Impact Scale (score range 0-72). A higher score indicates greater impairment. 25   Subjective Report   Patient report of  symptoms legs are heavy and tight all the time; not really painful or tender   Precautions   Precautions comments no known precautions; already treated for LLE cellulitis via antibiotic   Patient / Family Goals   Patient / family goals statement to make the legs smaller   Pain   Patient currently in pain No   Vitals Signs   Heart Rate 85   SpO2 97   Cognitive Status   Orientation Orientation to person, place and time   Level of consciousness Alert   Follows commands and answers questions 100% of the time   Personal safety and judgement Intact   Memory Intact   Edema Exam / Assessment   Skin condition Dryness;Intact;Pitting   Skin condition comments RLE skin intact, slightly dry specifically at medial lower leg with 2+ soft pitting present from foot to knee; varicose/spider veins presnts at R-dorsum and ankle;  LLE skin all intact with moderate dryness presenfr from toes to knee; posterior calf quite dry with some scattered scabs, no current opening seen, 3+ pitting present from feet to knees, skin is a lot more tight and firm vs RLE   Scar Yes   Location R-toe; screw from past surgery   Capillary refill Symmetrical   Dorsal pedal pulse Decreased   Dorsal pedal pulse comments from edema   Stemmer sign Negative   Ulceration No   Girth Measurements   Girth Measurements Refer to separate girth measurement flowsheet   Volume LE   Right LE (mL) 4606.99   Left LE (mL) 5213.46   LE volume comparison LLE volume greater than RLE volume   % difference 11.6%   Range of Motion   ROM comments functional LE range   Strength   Strength comments functional LE strength via observation of walking into clinic w/walker; patient with MS   Posture   Posture Normal   Palpation   Palpation mild tenderness   Activities of Daily Living   Activities of Daily Living mod-I at baseline due to using walker for MS   Sensory   Sensory perception Light touch   Sensory perception comments baseline numbness due to MS, inconsistent (L>R)   Vascular  Assessment   Vascular Assessment Comments BLE stasis dermatitis   Coordination   Coordination Gross motor coordination appropriate   Muscle Tone   Muscle tone No deficits were identified   Planned Edema Interventions   Planned edema interventions Gradient compression bandaging;Fit for compression garment;Exercises;Precautions to prevent infection / exacerbation;Education;Manual therapy;Skin care / precautions;Home management program development   Clinical Impression   Criteria for skilled therapeutic intervention met Yes   Therapy diagnosis BLE secondary lymphdema with venous component   Influenced by the following impairments / conditions Stage 2;Phlebolymphedema   Functional limitations due to impairments / conditions unable to wear socks, tight shoes and pants   Clinical Presentation Evolving/Changing   Clinical Presentation Rationale clinical judgement; chronic edema but recent cellulitis and wosening edema per pt report; skin changes; LLIS   Clinical Decision Making (Complexity) Low complexity   Treatment frequency 3 times / week   Treatment duration 12 weeks   Patient / family and/or staff in agreement with plan of care Yes   Risks and benefits of therapy have been explained Yes   Education Assessment   Preferred learning style Listening   Barriers to learning No barriers   Goals   Edema Eval Goals 1;2;3;4   Goal 1   Goal identifier stg   Goal description pt to have around the clock tolerance to BLE GCB for edema reduction response   Target date 11/21/18   Goal 2   Goal identifier ltg   Goal description once appropriate, pt to be independent with donning, doffing and care of compresison garments for longterm edema management for maintenance   Target date 02/05/19   Goal 3   Goal identifier ltg   Goal description pt to be independent with longterm edema management for maintenance via HEP, elevation, skin cares and compression garment wear/use   Target date 02/05/19   Goal 4   Goal identifier ltg   Goal  description pt to have at least 5 point improvement on LLIS due to decreased edema reductions in BLEs   Target date 02/05/19   Total Evaluation Time   Total evaluation time 10

## 2018-11-12 ENCOUNTER — HOSPITAL ENCOUNTER (OUTPATIENT)
Dept: PHYSICAL THERAPY | Facility: CLINIC | Age: 43
Setting detail: THERAPIES SERIES
End: 2018-11-12
Attending: FAMILY MEDICINE
Payer: COMMERCIAL

## 2018-11-12 PROCEDURE — 97140 MANUAL THERAPY 1/> REGIONS: CPT | Mod: GP | Performed by: REHABILITATION PRACTITIONER

## 2018-11-12 PROCEDURE — 40000099 ZZH STATISTIC LYMPHEDEMA VISIT: Performed by: REHABILITATION PRACTITIONER

## 2018-11-14 ENCOUNTER — HOSPITAL ENCOUNTER (OUTPATIENT)
Dept: PHYSICAL THERAPY | Facility: CLINIC | Age: 43
Setting detail: THERAPIES SERIES
End: 2018-11-14
Attending: FAMILY MEDICINE
Payer: COMMERCIAL

## 2018-11-14 PROCEDURE — 40000099 ZZH STATISTIC LYMPHEDEMA VISIT: Performed by: REHABILITATION PRACTITIONER

## 2018-11-14 PROCEDURE — 97140 MANUAL THERAPY 1/> REGIONS: CPT | Mod: GP | Performed by: REHABILITATION PRACTITIONER

## 2018-11-16 ENCOUNTER — HOSPITAL ENCOUNTER (OUTPATIENT)
Dept: PHYSICAL THERAPY | Facility: CLINIC | Age: 43
Setting detail: THERAPIES SERIES
End: 2018-11-16
Attending: FAMILY MEDICINE
Payer: COMMERCIAL

## 2018-11-16 PROCEDURE — 97140 MANUAL THERAPY 1/> REGIONS: CPT | Mod: GP | Performed by: REHABILITATION PRACTITIONER

## 2018-11-16 PROCEDURE — 40000099 ZZH STATISTIC LYMPHEDEMA VISIT: Performed by: REHABILITATION PRACTITIONER

## 2018-11-19 ENCOUNTER — HOSPITAL ENCOUNTER (OUTPATIENT)
Dept: PHYSICAL THERAPY | Facility: CLINIC | Age: 43
Setting detail: THERAPIES SERIES
End: 2018-11-19
Attending: FAMILY MEDICINE
Payer: COMMERCIAL

## 2018-11-19 PROCEDURE — 97140 MANUAL THERAPY 1/> REGIONS: CPT | Mod: GP | Performed by: REHABILITATION PRACTITIONER

## 2018-11-19 PROCEDURE — 40000099 ZZH STATISTIC LYMPHEDEMA VISIT: Performed by: REHABILITATION PRACTITIONER

## 2018-11-20 ENCOUNTER — TELEPHONE (OUTPATIENT)
Dept: FAMILY MEDICINE | Facility: CLINIC | Age: 43
End: 2018-11-20

## 2018-11-20 NOTE — TELEPHONE ENCOUNTER
Patient is called and her abd is red and slightly warm and swollen again.  appt scheduled. Christie RODRIGUEZ RN

## 2018-11-20 NOTE — TELEPHONE ENCOUNTER
Reason for call:  Patient reporting a symptom    Symptom or request: swelling in lower abdominal, warm to the touch    Duration (how long have symptoms been present): x3-over the weekend it started    Have you been treated for this before? Yes    Additional comments: pt was admitted to hospital for cellulitis 10/22    Phone Number patient can be reached at:  Home number on file [phone] (home)    Best Time:  any    Can we leave a detailed message on this number:  YES    Call taken on 11/20/2018 at 12:13 PM by Kristy Watson

## 2018-11-21 ENCOUNTER — HOSPITAL ENCOUNTER (OUTPATIENT)
Dept: PHYSICAL THERAPY | Facility: CLINIC | Age: 43
Setting detail: THERAPIES SERIES
End: 2018-11-21
Attending: FAMILY MEDICINE
Payer: COMMERCIAL

## 2018-11-21 ENCOUNTER — OFFICE VISIT (OUTPATIENT)
Dept: FAMILY MEDICINE | Facility: CLINIC | Age: 43
End: 2018-11-21
Payer: COMMERCIAL

## 2018-11-21 VITALS
HEART RATE: 108 BPM | SYSTOLIC BLOOD PRESSURE: 152 MMHG | OXYGEN SATURATION: 97 % | HEIGHT: 63 IN | BODY MASS INDEX: 51.91 KG/M2 | TEMPERATURE: 102 F | WEIGHT: 293 LBS | DIASTOLIC BLOOD PRESSURE: 95 MMHG

## 2018-11-21 DIAGNOSIS — L03.311 CELLULITIS OF ABDOMINAL WALL: Primary | ICD-10-CM

## 2018-11-21 PROCEDURE — 40000099 ZZH STATISTIC LYMPHEDEMA VISIT: Performed by: PHYSICAL THERAPIST

## 2018-11-21 PROCEDURE — 99213 OFFICE O/P EST LOW 20 MIN: CPT | Performed by: NURSE PRACTITIONER

## 2018-11-21 PROCEDURE — 97140 MANUAL THERAPY 1/> REGIONS: CPT | Mod: GP | Performed by: PHYSICAL THERAPIST

## 2018-11-21 RX ORDER — SULFAMETHOXAZOLE/TRIMETHOPRIM 800-160 MG
1 TABLET ORAL 2 TIMES DAILY
Qty: 20 TABLET | Refills: 0 | Status: SHIPPED | OUTPATIENT
Start: 2018-11-21 | End: 2019-02-26

## 2018-11-21 NOTE — PROGRESS NOTES
"  SUBJECTIVE:   Bess Enamorado is a 42 year old female who presents to clinic today for the following health issues:      ABDOMINAL CELLULITIS RECHECK      Duration: hospitalized in october    Description (location/character/radiation): while at the urologist yesterday she noticed redness, swelling and warmth of abdomen- thought it should be checked out    Intensity:  mild    Accompanying signs and symptoms:     History (similar episodes/previous evaluation): hospitalized in October for cellulitis of lower legs and abdomen    Precipitating or alleviating factors: None    Therapies tried and outcome:  Previous antibiotics (Keflex);  Ointment that she stopped a week ago           Problem list and histories reviewed & adjusted, as indicated.  Additional history: as documented    Reviewed and updated as needed this visit by clinical staff  Tobacco  Allergies  Meds       Reviewed and updated as needed this visit by Provider         ROS:  Constitutional, HEENT, cardiovascular, pulmonary, gi and gu systems are negative, except as otherwise noted.    OBJECTIVE:     BP (!) 152/95 (BP Location: Other (Comment))  Pulse 108  Temp 102  F (38.9  C)  Ht 5' 3\" (1.6 m)  Wt (!) 389 lb (176.4 kg)  SpO2 97%  BMI 68.91 kg/m2  Body mass index is 68.91 kg/(m^2).  GENERAL: healthy, alert and no distress  SKIN: skin under her lower abdominal fold is mildly erythematous, warm and tender to touch.      ASSESSMENT/PLAN:       ICD-10-CM    1. Cellulitis of abdominal wall L03.311 sulfamethoxazole-trimethoprim (BACTRIM DS/SEPTRA DS) 800-160 MG per tablet     Recently treated with Keflex.  Now with some return of erythema and tenderness to lower abdomen.  Will treat with Bactrim BID for 10 days.  Follow up with PCP next week.      The risks, benefits and treatment options of prescribed medications or other treatments have been discussed with the patient. The patient verbalized their understanding and should call or follow up if no " improvement or if they develop further problems.    JULIETA Perez [facility]

## 2018-11-21 NOTE — PATIENT INSTRUCTIONS
Thank you for choosing [facility].  You may be receiving a survey in the mail from Amy Masters regarding your visit today.  Please take a few minutes to complete and return the survey to let us know how we are doing.      If you have questions or concerns, please contact us via [employer] or you can contact your care team at [phone].    Our Clinic hours are:  Monday 6:40 am  to 7:00 pm  Tuesday -Friday 6:40 am to 5:00 pm    The [place] outpatient lab hours are:  Monday - Friday 6:10 am to 4:45 pm  Saturdays 7:00 am to 11:00 am  Appointments are required, call [phone]    If you have clinical questions after hours or would like to schedule an appointment,  call the clinic at [phone].

## 2018-11-21 NOTE — MR AVS SNAPSHOT
After Visit Summary   11/21/2018    Bess Enamorado    MRN: [id number]           Patient Information     Date Of Birth          1975        Visit Information        Provider Department      11/21/2018 1:00 PM Erum Pacheco APRN CNP [facility]        Today's Diagnoses     Cellulitis of abdominal wall    -  1      Care Instructions          Thank you for choosing [facility] ([facility]  You may be receiving a survey in the mail from [facility] regarding your visit today.  Please take a few minutes to complete and return the survey to let us know how we are doing.      If you have questions or concerns, please contact us via [employer] or you can contact your care team at [phone].    Our Clinic hours are:  Monday 6:40 am  to 7:00 pm  Tuesday -Friday 6:40 am to 5:00 pm    The [place] outpatient lab hours are:  Monday - Friday 6:10 am to 4:45 pm  Saturdays 7:00 am to 11:00 am  Appointments are required, call [phone]    If you have clinical questions after hours or would like to schedule an appointment,  call the clinic at [phone].            Follow-ups after your visit        Follow-up notes from your care team     Return in about 1 week (around 11/28/2018) for cellulitis recheck.      Your next 10 appointments already scheduled     Nov 23, 2018  8:30 AM CST   Lymphedema Treatment with Valentina Mendez PTA   [facility])    [address] [address]   [phone]            Nov 26, 2018  8:30 AM CST   Lymphedema Treatment with Valentina Mendez PTA   [facility] ([facility])    [address] [address]   [phone]            Nov 28, 2018  8:00 AM CST   Lymphedema Treatment with Mikala Fink PT   [facility])    [address] [address]   [phone]            Nov 30, 2018  8:30 AM  CST   Lymphedema Treatment with Valentina Mendez PTA   [facility] ([facility])    [address] [address]   [phone]            Dec 03, 2018  8:30 AM CST   Lymphedema Treatment with Valentina Mendez PTA   [facility] ([facility])    [address] [address]   [phone]            Dec 05, 2018  8:00 AM CST   Lymphedema Treatment with Mikala Fink PT   [facility] ([facility])    [address] [address]   [phone]            Dec 07, 2018  8:30 AM CST   Lymphedema Treatment with Valentina Mendez PTA   [facility] ([facility])    [address] [address]   [phone]              Who to contact     If you have questions or need follow up information about today's clinic visit or your schedule please contact [facility] directly at [phone].  Normal or non-critical lab and imaging results will be communicated to you by [employer], letter or phone within 4 business days after the clinic has received the results. If you do not hear from us within 7 days, please contact the clinic through [employer] or phone. If you have a critical or abnormal lab result, we will notify you by phone as soon as possible.  Submit refill requests through [employer] or call your pharmacy and they will forward the refill request to us. Please allow 3 business days for your refill to be completed.          Additional Information About Your Visit        [employer] Information     [employer] gives you secure access to your electronic health record. If you see a primary care provider, you can also send messages to your care team and make appointments. If you have questions, please call your primary care clinic.  If you do not have a primary care provider, please call [phone] and they will assist you.        Care  "EveryWhere ID     This is your Care EveryWhere ID. This could be used by other organizations to access your [place] medical records  [id number]        Your Vitals Were     Pulse Temperature Height Pulse Oximetry BMI (Body Mass Index)       108 102  F (38.9  C) 5' 3\" (1.6 m) 97% 68.91 kg/m2        Blood Pressure from Last 3 Encounters:   11/21/18 (!) 152/95   10/31/18 124/82   10/25/18 139/79    Weight from Last 3 Encounters:   11/21/18 (!) 389 lb (176.4 kg)   10/31/18 (!) 376 lb 12.8 oz (170.9 kg)   10/23/18 (!) 397 lb 14.9 oz (180.5 kg)              Today, you had the following     No orders found for display         Today's Medication Changes          These changes are accurate as of 11/21/18  1:33 PM.  If you have any questions, ask your nurse or doctor.               Start taking these medicines.        Dose/Directions    sulfamethoxazole-trimethoprim 800-160 MG per tablet   Commonly known as:  BACTRIM DS/SEPTRA DS   Used for:  Cellulitis of abdominal wall   Started by:  Erum Pacheco APRN CNP        Dose:  1 tablet   Take 1 tablet by mouth 2 times daily   Quantity:  20 tablet   Refills:  0         These medicines have changed or have updated prescriptions.        Dose/Directions    PARoxetine 30 MG tablet   Commonly known as:  PAXIL   This may have changed:  when to take this   Used for:  Anxiety        Dose:  30 mg   Take 1 tablet (30 mg) by mouth every morning   Quantity:  90 tablet   Refills:  3            Where to get your medicines      These medications were sent to [place] [address] [place] [address] [address]     Phone:  [phone]     sulfamethoxazole-trimethoprim 800-160 MG per tablet                Primary Care Provider Office Phone # Fax #    Elton Epstein [phone] [address] [address]        Equal Access to Services     CARIE DUNCAN AH: Hadii noemi Horn " suly amosirma hurtadogerry montero. So [facility] [phone].    ATENCIÓN: Si rupa hall, tiene a camacho disposición servicios gratuitos de asistencia lingüística. Arnulfo al [phone].    We comply with applicable federal civil rights laws and [place] laws. We do not discriminate on the basis of race, color, national origin, age, disability, sex, sexual orientation, or gender identity.            Thank you!     Thank you for choosing [facility]  for your care. Our goal is always to provide you with excellent care. Hearing back from our patients is one way we can continue to improve our services. Please take a few minutes to complete the written survey that you may receive in the mail after your visit with us. Thank you!             Your Updated Medication List - Protect others around you: Learn how to safely use, store and throw away your medicines at www.disposemymeds.org.          This list is accurate as of 11/21/18  1:33 PM.  Always use your most recent med list.                   Brand Name Dispense Instructions for use Diagnosis    acetaminophen 500 MG tablet    TYLENOL     Take 500-1,000 mg by mouth once as needed for mild pain        desogestrel-ethinyl estradiol 0.15-30 MG-MCG per tablet    APRI    112 tablet    Take 1 tablet by mouth daily Take as continuous daily dosing (omit placebo pills until after three months of hormone tablets.)    Polycystic ovaries, Encounter for surveillance of contraceptive pills       ibuprofen 200 MG tablet    ADVIL/MOTRIN     Take 600 mg by mouth every 6 hours as needed for mild pain        OCREVUS IV           order for DME      Equipment ordered: RESMED CPAP Mask type: Nasal Settings: 10 CM H2O        PARoxetine 30 MG tablet    PAXIL    90 tablet    Take 1 tablet (30 mg) by mouth every morning    Anxiety       sulfamethoxazole-trimethoprim 800-160 MG per tablet    BACTRIM DS/SEPTRA DS    20 tablet    Take 1 tablet by  mouth 2 times daily    Cellulitis of abdominal wall       triamcinolone 0.5 % cream    KENALOG    160 g    Apply topically 2 times daily    Stasis dermatitis of both legs       VITAMIN D3 PO      Take 10,000 Units by mouth daily

## 2018-11-23 ENCOUNTER — HOSPITAL ENCOUNTER (OUTPATIENT)
Dept: PHYSICAL THERAPY | Facility: CLINIC | Age: 43
Setting detail: THERAPIES SERIES
End: 2018-11-23
Attending: FAMILY MEDICINE
Payer: COMMERCIAL

## 2018-11-23 DIAGNOSIS — I89.0 SECONDARY LYMPHEDEMA: Primary | ICD-10-CM

## 2018-11-23 PROCEDURE — 40000099 ZZH STATISTIC LYMPHEDEMA VISIT: Performed by: REHABILITATION PRACTITIONER

## 2018-11-23 PROCEDURE — 97140 MANUAL THERAPY 1/> REGIONS: CPT | Mod: GP | Performed by: REHABILITATION PRACTITIONER

## 2018-11-28 ENCOUNTER — HOSPITAL ENCOUNTER (OUTPATIENT)
Dept: PHYSICAL THERAPY | Facility: CLINIC | Age: 43
Setting detail: THERAPIES SERIES
End: 2018-11-28
Attending: FAMILY MEDICINE
Payer: COMMERCIAL

## 2018-11-28 PROCEDURE — 40000099 ZZH STATISTIC LYMPHEDEMA VISIT: Performed by: PHYSICAL THERAPIST

## 2018-11-28 PROCEDURE — 97140 MANUAL THERAPY 1/> REGIONS: CPT | Mod: GP | Performed by: PHYSICAL THERAPIST

## 2018-11-30 ENCOUNTER — HOSPITAL ENCOUNTER (OUTPATIENT)
Dept: PHYSICAL THERAPY | Facility: CLINIC | Age: 43
Setting detail: THERAPIES SERIES
End: 2018-11-30
Attending: FAMILY MEDICINE
Payer: COMMERCIAL

## 2018-11-30 PROCEDURE — 97140 MANUAL THERAPY 1/> REGIONS: CPT | Mod: GP | Performed by: REHABILITATION PRACTITIONER

## 2018-11-30 PROCEDURE — 40000099 ZZH STATISTIC LYMPHEDEMA VISIT: Performed by: REHABILITATION PRACTITIONER

## 2018-12-03 ENCOUNTER — HOSPITAL ENCOUNTER (OUTPATIENT)
Dept: PHYSICAL THERAPY | Facility: CLINIC | Age: 43
Setting detail: THERAPIES SERIES
End: 2018-12-03
Attending: FAMILY MEDICINE
Payer: COMMERCIAL

## 2018-12-03 PROCEDURE — 97140 MANUAL THERAPY 1/> REGIONS: CPT | Mod: GP | Performed by: REHABILITATION PRACTITIONER

## 2018-12-03 PROCEDURE — 40000099 ZZH STATISTIC LYMPHEDEMA VISIT: Performed by: REHABILITATION PRACTITIONER

## 2018-12-05 ENCOUNTER — HOSPITAL ENCOUNTER (OUTPATIENT)
Dept: PHYSICAL THERAPY | Facility: CLINIC | Age: 43
Setting detail: THERAPIES SERIES
End: 2018-12-05
Attending: FAMILY MEDICINE
Payer: COMMERCIAL

## 2018-12-05 PROCEDURE — 97140 MANUAL THERAPY 1/> REGIONS: CPT | Mod: GP | Performed by: REHABILITATION PRACTITIONER

## 2018-12-05 PROCEDURE — 97140 MANUAL THERAPY 1/> REGIONS: CPT | Mod: GP | Performed by: PHYSICAL THERAPIST

## 2018-12-05 PROCEDURE — 40000099 ZZH STATISTIC LYMPHEDEMA VISIT: Performed by: PHYSICAL THERAPIST

## 2018-12-05 PROCEDURE — 40000099 ZZH STATISTIC LYMPHEDEMA VISIT: Performed by: REHABILITATION PRACTITIONER

## 2018-12-07 ENCOUNTER — HOSPITAL ENCOUNTER (OUTPATIENT)
Dept: PHYSICAL THERAPY | Facility: CLINIC | Age: 43
Setting detail: THERAPIES SERIES
End: 2018-12-07
Attending: FAMILY MEDICINE
Payer: COMMERCIAL

## 2018-12-07 PROCEDURE — 40000099 ZZH STATISTIC LYMPHEDEMA VISIT: Performed by: REHABILITATION PRACTITIONER

## 2018-12-07 PROCEDURE — 97140 MANUAL THERAPY 1/> REGIONS: CPT | Mod: GP | Performed by: REHABILITATION PRACTITIONER

## 2018-12-10 ENCOUNTER — HOSPITAL ENCOUNTER (OUTPATIENT)
Dept: PHYSICAL THERAPY | Facility: CLINIC | Age: 43
Setting detail: THERAPIES SERIES
End: 2018-12-10
Attending: FAMILY MEDICINE
Payer: COMMERCIAL

## 2018-12-10 PROCEDURE — 97140 MANUAL THERAPY 1/> REGIONS: CPT | Mod: GP | Performed by: REHABILITATION PRACTITIONER

## 2018-12-12 ENCOUNTER — HOSPITAL ENCOUNTER (OUTPATIENT)
Dept: PHYSICAL THERAPY | Facility: CLINIC | Age: 43
Setting detail: THERAPIES SERIES
End: 2018-12-12
Attending: FAMILY MEDICINE
Payer: COMMERCIAL

## 2018-12-12 PROCEDURE — 97140 MANUAL THERAPY 1/> REGIONS: CPT | Mod: GP | Performed by: PHYSICAL THERAPIST

## 2018-12-13 NOTE — PROGRESS NOTES
Outpatient Physical Therapy Progress Note     Patient: Bess Enamorado  : 1975    Beginning/End Dates of Reporting Period:  2018 to 2018    Referring Provider: Dr. Elton Epstein MD    Therapy Diagnosis: BLE secondary lymphedema      Client Self Report: no  today and had to use rest room pt 15' late, legs feel weaker and L ankle has been giving out but no falls    Objective Measurements:   Objective Measure: girth (as last measured on 18)  Objective Data: since last measured on : RLE -3.9% and LLE -0.9%    Outcome Measures (most recent score):   LLIS = 25 on date of initial evaluation; pt will again complete LLIS at time of discharge     Goals:  Goal Identifier stg   Goal Description pt to have around the clock tolerance to BLE GCB for edema reduction response   Target Date 18   Date Met  18   Progress:     Goal Identifier ltg   Goal Description once appropriate, pt to be independent with donning, doffing and care of compresison garments for longterm edema management for maintenance   Target Date 19   Date Met      Progress:     Goal Identifier ltg   Goal Description pt to be independent with longterm edema management for maintenance via HEP, elevation, skin cares and compression garment wear/use   Target Date 19   Date Met      Progress:     Goal Identifier ltg   Goal Description pt to have at least 5 point improvement on LLIS due to decreased edema reductions in BLEs   Target Date 19   Date Met      Progress:     Progress Toward Goals:   Good progress has made been toward goals with good reductions in BLEs. Compression stockings have been ordered as well as a donning device by patient online; will continue with GCB until garments arrive and then at that time patient will required education and training on garment wear, care and donning/doffing.      Plan:  Continue therapy per current plan of care.    Discharge:  No

## 2018-12-13 NOTE — ADDENDUM NOTE
Encounter addended by: Mikala Fink, PT on: 12/13/2018 12:56 PM   Actions taken: Sign clinical note, Flowsheet accepted

## 2018-12-14 ENCOUNTER — HOSPITAL ENCOUNTER (OUTPATIENT)
Dept: PHYSICAL THERAPY | Facility: CLINIC | Age: 43
Setting detail: THERAPIES SERIES
End: 2018-12-14
Attending: FAMILY MEDICINE
Payer: COMMERCIAL

## 2018-12-14 PROCEDURE — 97140 MANUAL THERAPY 1/> REGIONS: CPT | Mod: GP | Performed by: REHABILITATION PRACTITIONER

## 2018-12-17 ENCOUNTER — HOSPITAL ENCOUNTER (OUTPATIENT)
Dept: PHYSICAL THERAPY | Facility: CLINIC | Age: 43
Setting detail: THERAPIES SERIES
End: 2018-12-17
Attending: FAMILY MEDICINE
Payer: COMMERCIAL

## 2018-12-17 PROCEDURE — 97140 MANUAL THERAPY 1/> REGIONS: CPT | Mod: GP | Performed by: REHABILITATION PRACTITIONER

## 2018-12-19 ENCOUNTER — HOSPITAL ENCOUNTER (OUTPATIENT)
Dept: PHYSICAL THERAPY | Facility: CLINIC | Age: 43
Setting detail: THERAPIES SERIES
End: 2018-12-19
Attending: FAMILY MEDICINE
Payer: COMMERCIAL

## 2018-12-19 PROCEDURE — 97140 MANUAL THERAPY 1/> REGIONS: CPT | Mod: GP | Performed by: PHYSICAL THERAPIST

## 2018-12-21 ENCOUNTER — HOSPITAL ENCOUNTER (OUTPATIENT)
Dept: PHYSICAL THERAPY | Facility: CLINIC | Age: 43
Setting detail: THERAPIES SERIES
End: 2018-12-21
Attending: FAMILY MEDICINE
Payer: COMMERCIAL

## 2018-12-21 PROCEDURE — 97140 MANUAL THERAPY 1/> REGIONS: CPT | Mod: GP | Performed by: REHABILITATION PRACTITIONER

## 2019-01-09 NOTE — PROGRESS NOTES
Outpatient Physical Therapy Discharge Note     Patient: Bess Enamorado  : 1975    Beginning/End Dates of Reporting Period:  2018 to 2019    Referring Provider: Dr. Elton Epstein MD    Therapy Diagnosis: BLE secondary lymphedema, chronic      Client Self Report: pt arrived without compression stockings on B legs, tried for 20 minutes and unable to get them on    Objective Measurements:  Objective Measure: L LE  Details: posterior leg minimal clear drainage     Objective Measure: girth  Details: after GCB RLE -25% and LLE -31.6% (excellent reduction response); and then when pt with into stockings RLE +19.3% and LLE +7% as pt unable to consistently get stockings on      Outcome Measures (most recent score):   LLIS = 25 on date of initial evaluation, pt didn't return to discharge so unable to again complete LLIS     Goals:  Goal Identifier stg   Goal Description pt to have around the clock tolerance to BLE GCB for edema reduction response   Target Date 18   Date Met  18   Progress:     Goal Identifier     Goal Description once appropriate, pt to be independent with donning, doffing and care of compresison garments for longterm edema management for maintenance   Target Date 19   Date Met      Progress:     Goal Identifier ltg   Goal Description pt to be independent with longterm edema management for maintenance via HEP, elevation, skin cares and compression garment wear/use   Target Date 19   Date Met      Progress:     Goal Identifier ltg   Goal Description pt to have at least 5 point improvement on LLIS due to decreased edema reductions in BLEs   Target Date 19   Date Met      Progress:     Progress Toward Goals:   Patient made good progress toward goals with excellent reduction response in BLEs (see above) and was fit into compression stockings but pt with great difficulty in donning stockings on own despite trying multiple donning and doffing devices and legs re-filled  (see above). Pt was last seen on 12/21/18 and didn't return to clinic after so will be discharged.       Plan:  Discharge from therapy.    Discharge:    Reason for Discharge: Patient has failed to schedule further appointments.    Equipment Issued: 20-30mmHg knee hi Medi Plus compression stockings with SB for BLEs    Discharge Plan: Patient to continue home program. Encouraged patient to pursue PCA services to assist with at least donning stockings.

## 2019-01-09 NOTE — ADDENDUM NOTE
Encounter addended by: Mikala Fink, PT on: 1/9/2019 8:32 AM   Actions taken: Sign clinical note, Episode resolved

## 2019-02-18 ENCOUNTER — TRANSFERRED RECORDS (OUTPATIENT)
Dept: HEALTH INFORMATION MANAGEMENT | Facility: CLINIC | Age: 44
End: 2019-02-18

## 2019-02-26 ENCOUNTER — OFFICE VISIT (OUTPATIENT)
Dept: FAMILY MEDICINE | Facility: CLINIC | Age: 44
End: 2019-02-26
Payer: COMMERCIAL

## 2019-02-26 VITALS
BODY MASS INDEX: 51.91 KG/M2 | TEMPERATURE: 98.2 F | OXYGEN SATURATION: 97 % | RESPIRATION RATE: 20 BRPM | SYSTOLIC BLOOD PRESSURE: 138 MMHG | HEIGHT: 63 IN | DIASTOLIC BLOOD PRESSURE: 89 MMHG | HEART RATE: 100 BPM | WEIGHT: 293 LBS

## 2019-02-26 DIAGNOSIS — G35 MULTIPLE SCLEROSIS (H): Primary | Chronic | ICD-10-CM

## 2019-02-26 DIAGNOSIS — E66.01 MORBID OBESITY (H): Chronic | ICD-10-CM

## 2019-02-26 DIAGNOSIS — I10 BENIGN ESSENTIAL HYPERTENSION: Chronic | ICD-10-CM

## 2019-02-26 DIAGNOSIS — F33.1 MODERATE EPISODE OF RECURRENT MAJOR DEPRESSIVE DISORDER (H): ICD-10-CM

## 2019-02-26 PROCEDURE — 99214 OFFICE O/P EST MOD 30 MIN: CPT | Performed by: NURSE PRACTITIONER

## 2019-02-26 RX ORDER — OXYBUTYNIN CHLORIDE 15 MG/1
TABLET, EXTENDED RELEASE ORAL
Refills: 2 | COMMUNITY
Start: 2019-02-13 | End: 2019-08-16

## 2019-02-26 ASSESSMENT — PATIENT HEALTH QUESTIONNAIRE - PHQ9: SUM OF ALL RESPONSES TO PHQ QUESTIONS 1-9: 6

## 2019-02-26 ASSESSMENT — MIFFLIN-ST. JEOR: SCORE: 2478.88

## 2019-02-26 ASSESSMENT — PAIN SCALES - GENERAL: PAINLEVEL: MODERATE PAIN (5)

## 2019-02-26 NOTE — PROGRESS NOTES
"  SUBJECTIVE:   Bess Enamorado is a 43 year old female who presents to clinic today for the following health issues:    Chief Complaint   Patient presents with     Medication Problem     Patient has had 4 injections of Ocrelizumab and the last 3 times she didn't get high blood like she has now. Patient does get fever and hives after injection. Patient was given other medication to help lower and blood pressure and after diluting medication and putting in IV it caused the patient pain. Patient is here to follow-up on blood pressure today.      Patient Request     Patient would like to discuss options of weight loss surgery. Patient did check in to it several years ago but due to eating disorder was denied. Patient is currently going to the [facility] and seeing therapist.          Hypertension Follow-up      Outpatient blood pressures are not being checked.    Low Salt Diet: not monitoring salt      Amount of exercise or physical activity: None    Problems taking medications regularly: No    Medication side effects: fevers, hives and high blood pressure from injections    Diet: regular (no restrictions)            Problem list and histories reviewed & adjusted, as indicated.  Additional history: she has MS and is followed by Neurology clinic, notes not readily available in EMR. She states she is on Ocrelizumb and will get that IV injection every six months. It has a side effect of increasing blood pressure. With last injection on 2/18/19, her blood pressure was elevated and she states they gave her \"something IV\" to control that and is here for follow up on that. She denies any concerns such as headache, chest pain, shortness of breath. She reports her blood pressure is now \"back to her norm\" She has not been on anti hypertensive's in the past that she is aware of.  She is also here requesting a referral for weight loss surgery. She's struggled with her weight her whole life and with diagnosis of MS and " "depression/anxiety, it's worsened. She also has been diagnosed to have binge eating disorder. She went through the [facility] and did \"intense therapy\" that helped weight loss for awhile, but she was unable to sustain that. She is now seeing counselor weekly. Mood is reported to be stable at present.  She is single, no children. Works at [employer].        Patient Active Problem List   Diagnosis     Multiple sclerosis (H)     Polycystic ovaries     Constipation     CARDIOVASCULAR SCREENING; LDL GOAL LESS THAN 160     Anxiety     Restless legs     Leg edema     Dyspnea and respiratory abnormality     Eating disorder     Papanicolaou smear of cervix with low grade squamous intraepithelial lesion (LGSIL)     Morbid obesity (H)     Peroneal tendon rupture     Benign essential hypertension     NARCISA (obstructive sleep apnea)     Moderate episode of recurrent major depressive disorder (H)     Cellulitis of abdominal wall     Sepsis (H)     Past Surgical History:   Procedure Laterality Date     CHOLECYSTECTOMY, LAPOROSCOPIC      Cholecystectomy, Laparoscopic     OPEN REDUCTION INTERNAL FIXATION TOE(S)  4/13/2012    Procedure:OPEN REDUCTION INTERNAL FIXATION TOE(S); Open reduction internal fixation right proximal fifth metatarsal fracture-   Anes-choice block; Surgeon:LEY, JEFFREY DUANE; Location:[facility] OR       Social History     Tobacco Use     Smoking status: Never Smoker     Smokeless tobacco: Never Used   Substance Use Topics     Alcohol use: Yes     Comment: social     Family History   Problem Relation Age of Onset     Neurologic Disorder Father         MS     Heart Disease Father         irregular heart rate     Diabetes Father      Melanoma Father      Sleep Apnea Father      Cancer Maternal Grandfather         lung     Cancer Paternal Grandmother         stomach     Cancer Mother      Gynecology Maternal Aunt         [facility]         Current Outpatient Medications   Medication Sig Dispense Refill     acetaminophen " (TYLENOL) 500 MG tablet Take 500-1,000 mg by mouth once as needed for mild pain       Cholecalciferol (VITAMIN D3 PO) Take 10,000 Units by mouth daily        desogestrel-ethinyl estradiol (APRI) 0.15-30 MG-MCG per tablet Take 1 tablet by mouth daily Take as continuous daily dosing (omit placebo pills until after three months of hormone tablets.) 112 tablet 1     ibuprofen (ADVIL/MOTRIN) 200 MG tablet Take 600 mg by mouth every 6 hours as needed for mild pain       Ocrelizumab (OCREVUS IV)        order for DME Equipment ordered: RESMED CPAP Mask type: Nasal Settings: 10 CM H2O       oxybutynin ER (DITROPAN XL) 15 MG 24 hr tablet   2     PARoxetine (PAXIL) 30 MG tablet Take 1 tablet (30 mg) by mouth every morning (Patient taking differently: Take 30 mg by mouth every evening ) 90 tablet 3     Allergies   Allergen Reactions     Nkda [No Known Drug Allergies]      Recent Labs   Lab Test 10/25/18  0458 10/24/18  0503  10/22/18  1921 06/26/17  1400 03/21/17  1619  02/11/16  0942  08/20/15  1206  01/09/12  1049 11/07/11  0839   A1C  --   --   --   --  5.6  --   --  6.2*  --  5.7   < >  --   --    LDL  --   --   --   --   --   --   --  75  --  102  --   --  112   HDL  --   --   --   --   --   --   --  64  --  56  --   --  37*   TRIG  --   --   --   --   --   --   --  113  --   --   --   --  86   ALT  --  26  --  39  --  54*   < >  --    < >  --    < >  --  28   CR 0.75 0.77   < > 0.82  --  0.66   < >  --    < > 0.68   < > 0.75  --    GFRESTIMATED 84 82   < > 76  --  >90  Non  GFR Calc     < >  --    < > >90  Non  GFR Calc     < > 87  --    GFRESTBLACK >90 >90   < > >90  --  >90  African American GFR Calc     < >  --    < > >90   GFR Calc     < > >90  --    POTASSIUM 3.9 3.7  --  4.3  --   --   --   --   --  4.0   < > 4.6  --    TSH  --   --   --   --   --   --   --   --   --   --   --  2.81  --     < > = values in this interval not displayed.      BP Readings from Last 3  "Encounters:   02/26/19 138/89   11/21/18 (!) 152/95   10/31/18 124/82    Wt Readings from Last 3 Encounters:   02/26/19 (!) 186 kg (410 lb)   11/21/18 (!) 176.4 kg (389 lb)   10/31/18 (!) 170.9 kg (376 lb 12.8 oz)                    Reviewed and updated as needed this visit by clinical staff       Reviewed and updated as needed this visit by Provider          ROS: 10 point ROS neg other than the symptoms noted above in the HPI.    OBJECTIVE:     /89   Pulse 100   Temp 98.2  F (36.8  C) (Tympanic)   Resp 20   Ht 1.6 m (5' 3\")   Wt (!) 186 kg (410 lb)   SpO2 97%   BMI 72.63 kg/m    Body mass index is 72.63 kg/m .  GENERAL: healthy, alert and no distress  NECK: no adenopathy, no asymmetry  RESP: lungs clear to auscultation - no rales, rhonchi or wheezes  CV: regular rate and rhythm, normal S1 S2, no S3 or S4, no murmur  ABDOMEN: soft, obese  MS:using walker to aid in ambulation  PSYCH: pleasant, open, talkative    Diagnostic Test Results:  No results found for this or any previous visit (from the past 24 hour(s)).    ASSESSMENT/PLAN:             1. Multiple sclerosis (H)    Release for records from neurology clinic signed. She will continue with them for treatment of MS.    2. Morbid obesity (H)    - BARIATRIC ADULT REFERRAL  Referral placed for consult regarding weight loss options.    3. Moderate episode of recurrent major depressive disorder (H)    Reports to be stable.    4. Benign essential hypertension    Blood pressure came down to within acceptable parameters and patient reports to her baseline. Will continue to monitor pressure at work and in clinic. Weight loss will help.    See Patient Instructions  Patient Instructions   Continue to monitor blood pressure for now, goal is <140/90.  Call to schedule with bariatric surgery center.      Our Clinic hours are:  Mondays    7:20 am - 7 pm  Tues -  Fri  7:20 am - 5 pm    Clinic Phone: [phone]    The clinic lab opens at 7:30 am Mon - Fri and " appointments are required.    [facility] [place]  Ph. [phone]  Monday  8 am - 7pm  Tues - Fri 8 am - 5:30 pm             JULIETA Perez [facility]

## 2019-02-26 NOTE — PATIENT INSTRUCTIONS
Continue to monitor blood pressure for now, goal is <140/90.  Call to schedule with bariatric surgery center.      Our Clinic hours are:  Mondays    7:20 am - 7 pm  Tues -  Fri  7:20 am - 5 pm    Clinic Phone: [phone]    The clinic lab opens at 7:30 am Mon - Fri and appointments are required.    [facility]  Ph. [phone]  Monday  8 am - 7pm  Tues - Fri 8 am - 5:30 pm

## 2019-02-27 DIAGNOSIS — E28.2 POLYCYSTIC OVARIES: ICD-10-CM

## 2019-02-27 DIAGNOSIS — Z30.41 ENCOUNTER FOR SURVEILLANCE OF CONTRACEPTIVE PILLS: ICD-10-CM

## 2019-02-28 RX ORDER — DESOGESTREL AND ETHINYL ESTRADIOL 0.15-0.03
KIT ORAL
Qty: 112 TABLET | Refills: 3 | Status: SHIPPED | OUTPATIENT
Start: 2019-02-28 | End: 2020-02-13

## 2019-02-28 NOTE — TELEPHONE ENCOUNTER
Prescription approved per [facility] Refill Protocol.  Belen Martinez RN

## 2019-02-28 NOTE — TELEPHONE ENCOUNTER
"Apri, oral contraceptive  Last Written Prescription Date:  9/13/18  Last Fill Quantity: 112,  # refills: 1  Last office visit: 2/26/2019 with prescribing provider:    Future Office Visit:      Requested Prescriptions   Pending Prescriptions Disp Refills     APRI 0.15-30 MG-MCG tablet [Pharmacy Med Name: APRI 28 DAY TABLET] 112 tablet 1     Sig: TAKE 1 TABLET DAILY CONTINUOUSLY-OMIT PLACEBPO TABS UNTIL AFTER 3 MONTHS OF HORMONE TABS    Contraceptives Protocol Passed - 2/27/2019  8:53 PM       Passed - Patient is not a current smoker if age is 35 or older       Passed - Recent (12 mo) or future (30 days) visit within the authorizing provider's specialty    Patient had office visit in the last 12 months or has a visit in the next 30 days with authorizing provider or within the authorizing provider's specialty.  See \"Patient Info\" tab in inbasket, or \"Choose Columns\" in Meds & Orders section of the refill encounter.             Passed - Medication is active on med list       Passed - No active pregnancy on record       Passed - No positive pregnancy test in past 12 months          "

## 2019-04-08 DIAGNOSIS — F41.9 ANXIETY: ICD-10-CM

## 2019-04-08 NOTE — TELEPHONE ENCOUNTER
"Requested Prescriptions   Pending Prescriptions Disp Refills     PARoxetine (PAXIL) 30 MG tablet [Pharmacy Med Name: PAROXETINE HCL 30 MG TABLET] 90 tablet 2     Sig: TAKE 1 TABLET (30 MG) BY MOUTH EVERY MORNING  Last Written Prescription Date:  2/26/218  Last Fill Quantity: 90,  # refills: 3   Last office visit: 2/26/2019 with  provider:  Isela   Future Office Visit:           SSRIs Protocol Passed - 4/8/2019  7:22 AM  TALIB-7 SCORE 2/26/2018 3/23/2018 10/31/2018   Total Score - - -   Total Score 7 2 1     PHQ-9 SCORE 3/23/2018 10/31/2018 2/26/2019   PHQ-9 Total Score - - -   PHQ-9 Total Score 9 4 6           Passed - Recent (12 mo) or future (30 days) visit within the authorizing provider's specialty     Patient had office visit in the last 12 months or has a visit in the next 30 days with authorizing provider or within the authorizing provider's specialty.  See \"Patient Info\" tab in inbasket, or \"Choose Columns\" in Meds & Orders section of the refill encounter.              Passed - Medication is active on med list        Passed - Patient is age 18 or older        Passed - No active pregnancy on record        Passed - No positive pregnancy test in last 12 months          "

## 2019-04-08 NOTE — TELEPHONE ENCOUNTER
**This refill requires provider completion and is not appropriate for RN review per RN refill guidelines.**  PH-Q9 needs to be less than 5 to approve medication on RN Refill protocol pt's score is 6.  Mary Gramajo RN

## 2019-04-09 RX ORDER — PAROXETINE 30 MG/1
30 TABLET, FILM COATED ORAL EVERY MORNING
Qty: 30 TABLET | Refills: 1 | Status: SHIPPED | OUTPATIENT
Start: 2019-04-09 | End: 2019-05-08

## 2019-04-09 NOTE — TELEPHONE ENCOUNTER
Looks like patient was seen 2-26-19 by another Provider (Mary Weiss) and Depression was addressed and PHQ-9 was 6 on 2-26-19.     Do you want to add any refills? Or ask other Provider to order?    Thank you, Diane Escalante RN

## 2019-04-09 NOTE — TELEPHONE ENCOUNTER
Sent refill for 30 days.  Haven't seen patient for this since 3/2018 so due for appointment prior to next refill.  Thanks,  Elton Epstein MD

## 2019-04-10 NOTE — TELEPHONE ENCOUNTER
Ask Mary if she'll refill if she did address depression at last visit, otherwise needs appt.    Thanks,  Elton Epstein MD

## 2019-05-08 DIAGNOSIS — F41.9 ANXIETY: ICD-10-CM

## 2019-05-08 NOTE — TELEPHONE ENCOUNTER
"Requested Prescriptions   Pending Prescriptions Disp Refills     PARoxetine (PAXIL) 30 MG tablet [Pharmacy Med Name: PAROXETINE HCL 30 MG TABLET] 30 tablet 0     Sig: TAKE 1 TABLET (30 MG) BY MOUTH EVERY MORNING   Last Written Prescription Date:  4/9/19  Last Fill Quantity: 30 tab,  # refills: 1   Last office visit: 2/26/2019 with prescribing provider:  TIM Weiss   Future Office Visit:        SSRIs Protocol Passed - 5/8/2019  9:03 AM        Passed - Recent (12 mo) or future (30 days) visit within the authorizing provider's specialty     Patient had office visit in the last 12 months or has a visit in the next 30 days with authorizing provider or within the authorizing provider's specialty.  See \"Patient Info\" tab in inbasket, or \"Choose Columns\" in Meds & Orders section of the refill encounter.              Passed - Medication is active on med list        Passed - Patient is age 18 or older        Passed - No active pregnancy on record        Passed - No positive pregnancy test in last 12 months          "

## 2019-05-09 NOTE — TELEPHONE ENCOUNTER
Routing refill request to provider for review/approval because: Last PHQ-9 score > 5      TALIB-7 SCORE 2/26/2018 3/23/2018 10/31/2018   Total Score - - -   Total Score 7 2 1       PHQ-9 SCORE 3/23/2018 10/31/2018 2/26/2019   PHQ-9 Total Score - - -   PHQ-9 Total Score 9 4 6     Tiffany MCCALLUM RN

## 2019-05-10 RX ORDER — PAROXETINE 30 MG/1
30 TABLET, FILM COATED ORAL EVERY MORNING
Qty: 90 TABLET | Refills: 3 | Status: SHIPPED | OUTPATIENT
Start: 2019-05-10 | End: 2020-02-13

## 2019-06-09 ENCOUNTER — HOSPITAL ENCOUNTER (EMERGENCY)
Facility: CLINIC | Age: 44
Discharge: HOME | End: 2019-06-09
Attending: FAMILY MEDICINE | Admitting: FAMILY MEDICINE
Payer: COMMERCIAL

## 2019-06-09 ENCOUNTER — NURSE TRIAGE (OUTPATIENT)
Dept: NURSING | Facility: CLINIC | Age: 44
End: 2019-06-09

## 2019-06-09 VITALS
HEIGHT: 64 IN | SYSTOLIC BLOOD PRESSURE: 160 MMHG | OXYGEN SATURATION: 96 % | WEIGHT: 293 LBS | HEART RATE: 101 BPM | BODY MASS INDEX: 50.02 KG/M2 | TEMPERATURE: 97.9 F | DIASTOLIC BLOOD PRESSURE: 106 MMHG | RESPIRATION RATE: 16 BRPM

## 2019-06-09 DIAGNOSIS — R04.0 EPISTAXIS: ICD-10-CM

## 2019-06-09 LAB
BASOPHILS # BLD AUTO: 0.1 10E9/L (ref 0–0.2)
BASOPHILS NFR BLD AUTO: 0.8 %
DIFFERENTIAL METHOD BLD: ABNORMAL
EOSINOPHIL # BLD AUTO: 0.2 10E9/L (ref 0–0.7)
EOSINOPHIL NFR BLD AUTO: 2.4 %
ERYTHROCYTE [DISTWIDTH] IN BLOOD BY AUTOMATED COUNT: 14.6 % (ref 10–15)
HCT VFR BLD AUTO: 42.7 % (ref 35–47)
HGB BLD-MCNC: 13.1 G/DL (ref 11.7–15.7)
IMM GRANULOCYTES # BLD: 0 10E9/L (ref 0–0.4)
IMM GRANULOCYTES NFR BLD: 0.4 %
LYMPHOCYTES # BLD AUTO: 0.4 10E9/L (ref 0.8–5.3)
LYMPHOCYTES NFR BLD AUTO: 5.4 %
MCH RBC QN AUTO: 25.4 PG (ref 26.5–33)
MCHC RBC AUTO-ENTMCNC: 30.7 G/DL (ref 31.5–36.5)
MCV RBC AUTO: 83 FL (ref 78–100)
MONOCYTES # BLD AUTO: 0.7 10E9/L (ref 0–1.3)
MONOCYTES NFR BLD AUTO: 9.8 %
NEUTROPHILS # BLD AUTO: 5.8 10E9/L (ref 1.6–8.3)
NEUTROPHILS NFR BLD AUTO: 81.2 %
NRBC # BLD AUTO: 0 10*3/UL
NRBC BLD AUTO-RTO: 0 /100
PLATELET # BLD AUTO: 254 10E9/L (ref 150–450)
RBC # BLD AUTO: 5.15 10E12/L (ref 3.8–5.2)
WBC # BLD AUTO: 7.2 10E9/L (ref 4–11)

## 2019-06-09 PROCEDURE — 30903 CONTROL OF NOSEBLEED: CPT | Mod: RT | Performed by: FAMILY MEDICINE

## 2019-06-09 PROCEDURE — 85025 COMPLETE CBC W/AUTO DIFF WBC: CPT | Performed by: FAMILY MEDICINE

## 2019-06-09 PROCEDURE — 99284 EMERGENCY DEPT VISIT MOD MDM: CPT | Mod: 25 | Performed by: FAMILY MEDICINE

## 2019-06-09 RX ORDER — TRAMADOL HYDROCHLORIDE 50 MG/1
TABLET ORAL
Qty: 12 TABLET | Refills: 0 | Status: SHIPPED | OUTPATIENT
Start: 2019-06-09 | End: 2019-08-16

## 2019-06-09 ASSESSMENT — ENCOUNTER SYMPTOMS
EYE REDNESS: 0
BRUISES/BLEEDS EASILY: 0
NAUSEA: 0
SORE THROAT: 0
PSYCHIATRIC NEGATIVE: 1
FEVER: 0
SHORTNESS OF BREATH: 0
MUSCULOSKELETAL NEGATIVE: 1

## 2019-06-09 ASSESSMENT — MIFFLIN-ST. JEOR: SCORE: 2540.11

## 2019-06-09 NOTE — ED AVS SNAPSHOT
[facility]  [address] [address]  Phone:  [phone]  Fax:  [phone]                                    Bess Enamorado   MRN: [id number]    Department:  [facility]   Date of Visit:  6/9/2019           After Visit Summary Signature Page    I have received my discharge instructions, and my questions have been answered. I have discussed any challenges I see with this plan with the nurse or doctor.    ..........................................................................................................................................  Patient/Patient Representative Signature      ..........................................................................................................................................  Patient Representative Print Name and Relationship to Patient    ..................................................               ................................................  Date                                   Time    ..........................................................................................................................................  Reviewed by Signature/Title    ...................................................              ..............................................  Date                                               Time          22EPIC Rev 08/18

## 2019-06-09 NOTE — TELEPHONE ENCOUNTER
44 y/o female calls about a nose bleed that has been off and on since 10:30 this morning, RN reviewed correct  measure of pressure, squeezing the anterior nasal septum (the soft parts of the nose using the thumb and index finger) she has tried several attempts with 10 minutes of pressure each with no success. RN advised her that this is more than a sufficient attempt to stop the bleeding, for future the recommendation is after 30 minutes of attempts go to the ER, RN advised her per protocols to go now to the ER for help with the bleeding.    Aimee Cooper RN - [place] Nurse Advisor  6/09/2019    5:20PM    Reason for Disposition    [1] Bleeding present > 30 minutes AND [2] using correct method of direct pressure    Additional Information    Negative: Fainted or too weak to stand following large blood loss    Negative: Sounds like a life-threatening emergency to the triager    Protocols used: NOSEBLEED-A-

## 2019-06-09 NOTE — ED NOTES
Epistaxis in both nostrils since 1030 this morning.  More out of right nostril.  Nose clamp in place.  Bam Paige RN on 6/9/2019 at 6:37 PM

## 2019-06-10 ENCOUNTER — OFFICE VISIT (OUTPATIENT)
Dept: FAMILY MEDICINE | Facility: CLINIC | Age: 44
End: 2019-06-10
Payer: COMMERCIAL

## 2019-06-10 VITALS
RESPIRATION RATE: 18 BRPM | OXYGEN SATURATION: 94 % | DIASTOLIC BLOOD PRESSURE: 106 MMHG | HEIGHT: 63 IN | BODY MASS INDEX: 51.91 KG/M2 | TEMPERATURE: 99.1 F | WEIGHT: 293 LBS | HEART RATE: 105 BPM | SYSTOLIC BLOOD PRESSURE: 160 MMHG

## 2019-06-10 DIAGNOSIS — R04.0 EPISTAXIS: Primary | ICD-10-CM

## 2019-06-10 DIAGNOSIS — I10 BENIGN ESSENTIAL HYPERTENSION: Chronic | ICD-10-CM

## 2019-06-10 PROCEDURE — 99214 OFFICE O/P EST MOD 30 MIN: CPT | Performed by: FAMILY MEDICINE

## 2019-06-10 RX ORDER — LISINOPRIL 10 MG/1
10 TABLET ORAL DAILY
Qty: 30 TABLET | Refills: 0 | Status: SHIPPED | OUTPATIENT
Start: 2019-06-10 | End: 2019-07-01

## 2019-06-10 ASSESSMENT — MIFFLIN-ST. JEOR: SCORE: 2524.24

## 2019-06-10 ASSESSMENT — PAIN SCALES - GENERAL: PAINLEVEL: MODERATE PAIN (4)

## 2019-06-10 NOTE — ED PROVIDER NOTES
History     Chief Complaint   Patient presents with     Epistaxis     started around 10am, nose clamp placed in triage     HPI  Bess Enamorado is a 43 year old female who developed epistaxis this morning.  She states that it began when she was picking her nose a bit.  She had some dryness.  She has been taking some ibuprofen lately.  .  She has no past history of epistaxis.  No known blood clotting disorders.  She is not on any other blood thinning medications.    Also patient is not having dizziness or faintness    Allergies:  Allergies   Allergen Reactions     Nkda [No Known Drug Allergies]        Problem List:    Patient Active Problem List    Diagnosis Date Noted     Sepsis (H) 10/23/2018     Priority: Medium     Cellulitis of abdominal wall 10/22/2018     Priority: Medium     Moderate episode of recurrent major depressive disorder (H) 03/23/2018     Priority: Medium     NARCISA (obstructive sleep apnea) 08/22/2017     Priority: Medium     Severe NARCISA with sleep-associated hypoxemia, with likely REM-related hypoventilation  Polysomnography - Test date 8/9/2017  AHI 45.9, basline SpO2 92.9%, mike SpO2 54.9%, time of SpO2 <= 88% of 31.7 minutes.  Severe desaturations and sustained hypoxemia confined to singular supine REM period (no lateral REM observed for comparison).  Also seen to have TCM increase by ~15 mmHg over baseline in supine REM, consistent with hypoventilation.  Pre-study VBG was WNL.  CPAP titrated to 10 cm H2O and appeared optimal, including supine REM, with normalization of SpO2 and TCM normalized to low-40's.  Seen to have frequent PLM's (64.3 / hour on diagnostic, 89.6 / hour on treatment, ~20% associated with cortical arousals).         Benign essential hypertension 02/15/2016     Priority: Medium     Peroneal tendon rupture 11/23/2015     Priority: Medium     Morbid obesity (H) 08/24/2015     Priority: Medium     Oct 2016:  Starting [employer] program in [place], goal to lose 140 lbs over next 2  years       Papanicolaou smear of cervix with low grade squamous intraepithelial lesion (LGSIL) 08/03/2014     Priority: Medium     7/29/2014:Pap--LSIL. Neg high risk HPV. Plan cotest in 1 year (if this is ASCUS or LSIL then colp). In reminders  8/20/15: Pap - ASCUS, Neg HPV. Plan colp  9/14/15: [place] Bx & ECC - negative. Plan cotest in 1 year.   10/20/16: NIL Pap, Neg HPV. Plan cotest in 3 years.        Eating disorder 08/25/2013     Priority: Medium     Binge-eating disorder; social phobia  See psychological assessment from [facility], Dr. Anupama Bowie, 8/14/2013  Problem list name updated by automated process. Provider to review       Leg edema 04/19/2013     Priority: Medium     Dyspnea and respiratory abnormality 04/19/2013     Priority: Medium     Problem list name updated by automated process. Provider to review       Anxiety 06/28/2011     Priority: Medium     Followed by neurologist       Restless legs 06/28/2011     Priority: Medium     CARDIOVASCULAR SCREENING; LDL GOAL LESS THAN 160 10/31/2010     Priority: Medium     Constipation 09/18/2006     Priority: Medium     9/18/2006    Has tried otc suppository and otc lax.   Problem list name updated by automated process. Provider to review       Multiple sclerosis (H) 08/29/2005     Priority: Medium     9/18/200pt dx with MS 2004--dx by neurolgist and is followed by Dr. Jeet Nicholson, also MS nurse Heather Thomas       Polycystic ovaries 08/29/2005     Priority: Medium     Diagnosed in [place], had facial hair, overweight, carb craving, records being requested, never on metformin          Past Medical History:    Past Medical History:   Diagnosis Date     ASCUS favor benign 8/2015     H/O colposcopy with cervical biopsy 9/14/15     LSIL on Pap smear 7/2014     Multiple sclerosis (H) 8/29/2005     Shingles 10/2016     UNSPEC CONSTIPATION 9/18/2006       Past Surgical History:    Past Surgical History:   Procedure Laterality Date      "CHOLECYSTECTOMY, LAPOROSCOPIC      Cholecystectomy, Laparoscopic     OPEN REDUCTION INTERNAL FIXATION TOE(S)  4/13/2012    Procedure:OPEN REDUCTION INTERNAL FIXATION TOE(S); Open reduction internal fixation right proximal fifth metatarsal fracture-   Anes-choice block; Surgeon:LEY, JEFFREY DUANE; Location:[facility] OR       Family History:    Family History   Problem Relation Age of Onset     Neurologic Disorder Father         MS     Heart Disease Father         irregular heart rate     Diabetes Father      Melanoma Father      Sleep Apnea Father      Cancer Maternal Grandfather         lung     Cancer Paternal Grandmother         stomach     Cancer Mother      Gynecology Maternal Aunt         PCOS       Social History:  Marital Status:  Single [1]  Social History     Tobacco Use     Smoking status: Never Smoker     Smokeless tobacco: Never Used   Substance Use Topics     Alcohol use: Yes     Comment: social     Drug use: No        Medications:      traMADol (ULTRAM) 50 MG tablet   acetaminophen (TYLENOL) 500 MG tablet   APRI 0.15-30 MG-MCG tablet   Cholecalciferol (VITAMIN D3 PO)   ibuprofen (ADVIL/MOTRIN) 200 MG tablet   Ocrelizumab (OCREVUS IV)   order for DME   oxybutynin ER (DITROPAN XL) 15 MG 24 hr tablet   PARoxetine (PAXIL) 30 MG tablet         Review of Systems   Constitutional: Negative for fever.   HENT: Negative for congestion and sore throat.    Eyes: Negative for redness.   Respiratory: Negative for shortness of breath.    Cardiovascular: Negative for chest pain.   Gastrointestinal: Negative for nausea.   Musculoskeletal: Negative.    Neurological:        Has known MS.   Hematological: Does not bruise/bleed easily.   Psychiatric/Behavioral: Negative.          Physical Exam   BP: (!) 160/106  Pulse: 101  Temp: 97.9  F (36.6  C)  Resp: 16  Height: 162.6 cm (5' 4\")  Weight: (!) 190.5 kg (420 lb)(stated)  SpO2: 96 %      Physical Exam   Constitutional:   Considerably overweight.   HENT:   Head: Normocephalic. "     Red, active bleeding R nostril.  This appears to be an anterior bleed.  Crusted blood in posterior pharynx.   Neck: No thyromegaly present.   Cardiovascular: Normal rate.   Pulmonary/Chest: No respiratory distress.   Lymphadenopathy:     She has no cervical adenopathy.   Skin:   No bruising.   Psychiatric: She has a normal mood and affect.       ED Course        Procedures    After failure of direct pressure, Rhino Rocket was obtained.  5.5 cm size anterior.  Verbal consent obtained.  Patient cleared some old clots from nostrils by blowing.  Frontal rocket was placed uneventfully.  4 cc air was placed.  Patient observed for more than 1/2-hour and there was no recurrence of bleeding.               Critical Care time:  none               Results for orders placed or performed during the hospital encounter of 06/09/19 (from the past 24 hour(s))   CBC with platelets differential   Result Value Ref Range    WBC 7.2 4.0 - 11.0 10e9/L    RBC Count 5.15 3.8 - 5.2 10e12/L    Hemoglobin 13.1 11.7 - 15.7 g/dL    Hematocrit 42.7 35.0 - 47.0 %    MCV 83 78 - 100 fl    MCH 25.4 (L) 26.5 - 33.0 pg    MCHC 30.7 (L) 31.5 - 36.5 g/dL    RDW 14.6 10.0 - 15.0 %    Platelet Count 254 150 - 450 10e9/L    Diff Method Automated Method     % Neutrophils 81.2 %    % Lymphocytes 5.4 %    % Monocytes 9.8 %    % Eosinophils 2.4 %    % Basophils 0.8 %    % Immature Granulocytes 0.4 %    Nucleated RBCs 0 0 /100    Absolute Neutrophil 5.8 1.6 - 8.3 10e9/L    Absolute Lymphocytes 0.4 (L) 0.8 - 5.3 10e9/L    Absolute Monocytes 0.7 0.0 - 1.3 10e9/L    Absolute Eosinophils 0.2 0.0 - 0.7 10e9/L    Absolute Basophils 0.1 0.0 - 0.2 10e9/L    Abs Immature Granulocytes 0.0 0 - 0.4 10e9/L    Absolute Nucleated RBC 0.0        Medications - No data to display    Assessments & Plan (with Medical Decision Making)     Patient presented with epistaxis.  This bleeding was controlled with placement of Rhino Rocket.  Patient was observed and there was no  recurrence of bleeding.  Her hemoglobin was 13.1.  Patient was advised.  She is to have follow-up in 1 day.  Some modest pain medication was provided.  Instructions discussed.  She voices understanding of recommendations.        I have reviewed the nursing notes.    I have reviewed the findings, diagnosis, plan and need for follow up with the patient.          Medication List      Started    traMADol 50 MG tablet  Commonly known as:  ULTRAM  1-2 tablets every 6 hours if needed for pain.            Final diagnoses:   Epistaxis       6/9/2019   [facility] EMERGENCY DEPARTMENT     Richmond Malagon MD  06/09/19 [id number]

## 2019-06-10 NOTE — PATIENT INSTRUCTIONS
Start Lisinopril 10 mg    Recheck blood pressure and blood work in 3-4 weeks with RN.         Our Clinic hours are:  Mondays    7:20 am - 7 pm  Tues -  Fri  7:20 am - 5 pm    Clinic Phone: [phone]    The clinic lab opens at 7:30 am Mon - Fri and appointments are required.    [facility]  Ph. [phone]  Monday  8 am - 7pm  Tues - Fri 8 am - 5:30 pm

## 2019-06-10 NOTE — PROGRESS NOTES
"Subjective     Bess Enamorado is a 43 year old female who presents to clinic today for the following health issues:    [facility]   ED/UC Followup:    Facility:  [facility]  Date of visit: 6/9/19  Reason for visit: [facility]  Bess Enamorado is a 43 year old female who developed epistaxis this morning.  She states that it began when she was picking her nose a bit.  She had some dryness.  She has been taking some ibuprofen lately.  .  She has no past history of epistaxis.  No known blood clotting disorders.  She is not on any other blood thinning medications.     Also patient is not having dizziness or faintness    Current Status: very uncomfortable. Blood is no longer draining in her throat.      Packing placed about 7 pm last night.  This was the only apt she could get for today.    Blood pressure noted to be quite elevated, this is not the first time.    BP Readings from Last 6 Encounters:   06/10/19 (!) 160/106   06/09/19 (!) 160/106   02/26/19 138/89   11/21/18 (!) 152/95   10/31/18 124/82   10/25/18 139/79     Has NARCISA and does not currently use her CPAP - this was encouraged and explained the importance of this.         Review of Systems   ROS COMP: Constitutional, HEENT, cardiovascular, pulmonary, gi and gu systems are negative, except as otherwise noted.      Objective    BP (!) 160/106   Pulse 105   Temp 99.1  F (37.3  C) (Tympanic)   Resp 18   Ht 1.6 m (5' 3\")   Wt (!) 190.5 kg (420 lb)   SpO2 94%   BMI 74.40 kg/m    Body mass index is 74.4 kg/m .  Physical Exam   GENERAL: healthy, alert and no distress, morbidly obese  HENT:  Rhino rocket in the right nare.    5 ml syringe was used to deflate the packing and it was removed without difficulty.  No bleeding after the packing was removed.     Diagnostic Test Results:  Labs reviewed in Epic        Assessment & Plan     1. Epistaxis   packing removed without difficulty.  If recurrent bleeds, may need ENT.  Follow up prn on this.   - HC REMOVAL NASAL PACKING    2. Benign " "essential hypertension  Blood pressure uncontrolled. Encouraged to use her CPAP for the NARCISA.   Start lisinopril, recheck blood work and blood pressure with RN in 3 weeks. BMP was normal in October 2018.   - lisinopril (PRINIVIL/ZESTRIL) 10 MG tablet; Take 1 tablet (10 mg) by mouth daily  Dispense: 30 tablet; Refill: 0  - Basic metabolic panel; Future     BMI:   Estimated body mass index is 74.4 kg/m  as calculated from the following:    Height as of this encounter: 1.6 m (5' 3\").    Weight as of this encounter: 190.5 kg (420 lb).   Weight management plan: Patient was referred to their PCP to discuss a diet and exercise plan.            Return in about 3 weeks (around 7/1/2019) for nurse blood pressure recheck and labs.    Mikala Luna MD  [facility]    "

## 2019-06-10 NOTE — DISCHARGE INSTRUCTIONS
Leave nasal packing in place.    Take prescribed pain medication as needed.    Have follow-up in primary care office tomorrow or the following day.    If you have recurrence of bleeding which will not diminished, return to ER.

## 2019-06-20 ENCOUNTER — TRANSFERRED RECORDS (OUTPATIENT)
Dept: HEALTH INFORMATION MANAGEMENT | Facility: CLINIC | Age: 44
End: 2019-06-20

## 2019-07-01 ENCOUNTER — ALLIED HEALTH/NURSE VISIT (OUTPATIENT)
Dept: FAMILY MEDICINE | Facility: CLINIC | Age: 44
End: 2019-07-01
Payer: COMMERCIAL

## 2019-07-01 VITALS — HEART RATE: 100 BPM | DIASTOLIC BLOOD PRESSURE: 78 MMHG | SYSTOLIC BLOOD PRESSURE: 150 MMHG

## 2019-07-01 DIAGNOSIS — I10 BENIGN ESSENTIAL HYPERTENSION: Chronic | ICD-10-CM

## 2019-07-01 LAB
ANION GAP SERPL CALCULATED.3IONS-SCNC: 4 MMOL/L (ref 3–14)
BUN SERPL-MCNC: 18 MG/DL (ref 7–30)
CALCIUM SERPL-MCNC: 9.7 MG/DL (ref 8.5–10.1)
CHLORIDE SERPL-SCNC: 104 MMOL/L (ref 94–109)
CO2 SERPL-SCNC: 28 MMOL/L (ref 20–32)
CREAT SERPL-MCNC: 0.65 MG/DL (ref 0.52–1.04)
GFR SERPL CREATININE-BSD FRML MDRD: >90 ML/MIN/{1.73_M2}
GLUCOSE SERPL-MCNC: 108 MG/DL (ref 70–99)
POTASSIUM SERPL-SCNC: 4.5 MMOL/L (ref 3.4–5.3)
SODIUM SERPL-SCNC: 136 MMOL/L (ref 133–144)

## 2019-07-01 PROCEDURE — 36415 COLL VENOUS BLD VENIPUNCTURE: CPT | Performed by: FAMILY MEDICINE

## 2019-07-01 PROCEDURE — 80048 BASIC METABOLIC PNL TOTAL CA: CPT | Performed by: FAMILY MEDICINE

## 2019-07-01 PROCEDURE — 99207 ZZC NO CHARGE NURSE ONLY: CPT

## 2019-07-01 RX ORDER — LISINOPRIL 10 MG/1
10 TABLET ORAL DAILY
Qty: 30 TABLET | Refills: 1 | Status: SHIPPED | OUTPATIENT
Start: 2019-07-01 | End: 2019-07-01 | Stop reason: DRUGHIGH

## 2019-07-01 RX ORDER — LISINOPRIL 20 MG/1
20 TABLET ORAL DAILY
Qty: 30 TABLET | Refills: 0 | Status: SHIPPED | OUTPATIENT
Start: 2019-07-01 | End: 2019-08-16

## 2019-07-01 NOTE — PROGRESS NOTES
Vitals:    07/01/19 1138 07/01/19 1140   BP: 144/60 150/78   BP Location: Right arm Right arm   Patient Position: Sitting Sitting   Cuff Size: Adult Regular Adult Regular   Pulse: 100        B/P check.  Pt started on Lisinopril 10 mg on 6/10/19.  B/P's were done on forearm with Reg cuff and on upper arm with the Long cuff.  Pt will schedule appt when leaving today.  Refill given to cover until appt.  Mirian     Spoke with  and pt will increase her Lisinopril to 20 mg/day.  New order placed.  Pt hadn't picked up the other refill yet.  Pt will be seen in August.  Mirian

## 2019-07-24 ENCOUNTER — TRANSFERRED RECORDS (OUTPATIENT)
Dept: HEALTH INFORMATION MANAGEMENT | Facility: CLINIC | Age: 44
End: 2019-07-24

## 2019-08-16 ENCOUNTER — OFFICE VISIT (OUTPATIENT)
Dept: FAMILY MEDICINE | Facility: CLINIC | Age: 44
End: 2019-08-16
Payer: COMMERCIAL

## 2019-08-16 VITALS
DIASTOLIC BLOOD PRESSURE: 88 MMHG | WEIGHT: 293 LBS | OXYGEN SATURATION: 96 % | RESPIRATION RATE: 20 BRPM | SYSTOLIC BLOOD PRESSURE: 138 MMHG | HEART RATE: 104 BPM | TEMPERATURE: 99.1 F | BODY MASS INDEX: 51.91 KG/M2 | HEIGHT: 63 IN

## 2019-08-16 DIAGNOSIS — I10 BENIGN ESSENTIAL HYPERTENSION: Chronic | ICD-10-CM

## 2019-08-16 DIAGNOSIS — R00.0 SINUS TACHYCARDIA: Primary | ICD-10-CM

## 2019-08-16 PROCEDURE — 99214 OFFICE O/P EST MOD 30 MIN: CPT | Performed by: FAMILY MEDICINE

## 2019-08-16 RX ORDER — METOPROLOL SUCCINATE 25 MG/1
25 TABLET, EXTENDED RELEASE ORAL DAILY
Qty: 90 TABLET | Refills: 1 | Status: SHIPPED | OUTPATIENT
Start: 2019-08-16 | End: 2020-02-13

## 2019-08-16 RX ORDER — LISINOPRIL 20 MG/1
20 TABLET ORAL DAILY
Qty: 90 TABLET | Refills: 3 | Status: SHIPPED | OUTPATIENT
Start: 2019-08-16 | End: 2020-02-13

## 2019-08-16 ASSESSMENT — PAIN SCALES - GENERAL: PAINLEVEL: MODERATE PAIN (5)

## 2019-08-16 ASSESSMENT — PATIENT HEALTH QUESTIONNAIRE - PHQ9: SUM OF ALL RESPONSES TO PHQ QUESTIONS 1-9: 3

## 2019-08-16 ASSESSMENT — MIFFLIN-ST. JEOR: SCORE: 2524.24

## 2019-08-16 NOTE — PROGRESS NOTES
"Subjective     Bess Enamorado is a 43 year old female who presents to clinic today for the following health issues:    HPI   Chief Complaint   Patient presents with     Hypertension     Patient/info Update     Patient has stress fractures in pelvis and tear in left hip and seeing OB/GYN at [facility].       Hypertension Follow-up      Do you check your blood pressure regularly outside of the clinic? No     Are you following a low salt diet? No    Are your blood pressures ever more than 140 on the top number (systolic) OR more   than 90 on the bottom number (diastolic), for example 140/90? Yes      How many servings of fruits and vegetables do you eat daily?  0-1    On average, how many sweetened beverages do you drink each day (soda, juice, sweet tea, etc)?  2- 3  How many days per week do you miss taking your medication? 1    What makes it hard for you to take your medications?  remembering to take    Patient tells me she also has a dermoid that she has seen OB and has been referred to OB/GYN at the [facility] for.  Likely will need surgery by her report.      Noted that her HR is still persistently elevated.      Reviewed and updated as needed this visit by Provider         Review of Systems   ROS COMP: Constitutional, HEENT, cardiovascular, pulmonary, gi and gu systems are negative, except as otherwise noted.      Objective    /88   Pulse 104   Temp 99.1  F (37.3  C) (Tympanic)   Resp 20   Ht 1.6 m (5' 3\")   Wt (!) 190.5 kg (420 lb)   LMP 07/29/2019   SpO2 96%   BMI 74.40 kg/m    Body mass index is 74.4 kg/m .  Physical Exam   GENERAL: healthy, alert and no distress  NECK: no adenopathy, no asymmetry, masses, or scars and thyroid normal to palpation  RESP: lungs clear to auscultation - no rales, rhonchi or wheezes  CV: tachycardia, normal S1 S2, no S3 or S4 and no murmur, click or rub  ABDOMEN: soft, nontender, no hepatosplenomegaly, no masses and bowel sounds normal  MS: no gross musculoskeletal defects " noted, no edema    Diagnostic Test Results:  Labs reviewed in Epic        Assessment & Plan     1. Benign essential hypertension     - lisinopril (PRINIVIL/ZESTRIL) 20 MG tablet; Take 1 tablet (20 mg) by mouth daily  Dispense: 90 tablet; Refill: 3  - metoprolol succinate ER (TOPROL-XL) 25 MG 24 hr tablet; Take 1 tablet (25 mg) by mouth daily  Dispense: 90 tablet; Refill: 1    2. Sinus tachycardia     - metoprolol succinate ER (TOPROL-XL) 25 MG 24 hr tablet; Take 1 tablet (25 mg) by mouth daily  Dispense: 90 tablet; Refill: 1     Patient Instructions   Continue Lisinopril 20 mg    Add metoprolol xl 25 mg daily     Keep me updated on heart rate values - we may need to increase the metoprolol a little bit to achieve target heart rate (70-80 ideally).    Mikala Luna M.D.      Our Clinic hours are:  Mondays    7:20 am - 7 pm  Tues -  Fri  7:20 am - 5 pm    Clinic Phone: [phone]    The clinic lab opens at 7:30 am Mon - Fri and appointments are required.    [facility]  Ph. [phone]  Monday  8 am - 7pm  Tues - Fri 8 am - 5:30 pm                     No follow-ups on file.    Mikala Luna MD  [facility]

## 2019-08-16 NOTE — PATIENT INSTRUCTIONS
Continue Lisinopril 20 mg    Add metoprolol xl 25 mg daily     Keep me updated on heart rate values - we may need to increase the metoprolol a little bit to achieve target heart rate (70-80 ideally).    Mikala Luna M.D.      Our Clinic hours are:  Mondays    7:20 am - 7 pm  Tues -  Fri  7:20 am - 5 pm    Clinic Phone: [phone]    The clinic lab opens at 7:30 am Mon - Fri and appointments are required.    [facility]. [phone]  Monday  8 am - 7pm  Tues - Fri 8 am - 5:30 pm

## 2019-08-17 ENCOUNTER — HOSPITAL ENCOUNTER (EMERGENCY)
Facility: CLINIC | Age: 44
Discharge: HOME | End: 2019-08-17
Attending: FAMILY MEDICINE | Admitting: FAMILY MEDICINE
Payer: COMMERCIAL

## 2019-08-17 VITALS
SYSTOLIC BLOOD PRESSURE: 153 MMHG | HEIGHT: 64 IN | DIASTOLIC BLOOD PRESSURE: 89 MMHG | HEART RATE: 104 BPM | BODY MASS INDEX: 50.02 KG/M2 | TEMPERATURE: 98.3 F | WEIGHT: 293 LBS | OXYGEN SATURATION: 97 % | RESPIRATION RATE: 16 BRPM

## 2019-08-17 DIAGNOSIS — R60.0 BILATERAL LEG EDEMA: ICD-10-CM

## 2019-08-17 PROCEDURE — 99283 EMERGENCY DEPT VISIT LOW MDM: CPT

## 2019-08-17 PROCEDURE — 99283 EMERGENCY DEPT VISIT LOW MDM: CPT | Mod: Z6 | Performed by: FAMILY MEDICINE

## 2019-08-17 ASSESSMENT — MIFFLIN-ST. JEOR: SCORE: 2540.11

## 2019-08-17 NOTE — ED PROVIDER NOTES
HPI   The patient is a 43-year-old female presenting with concerns about her lower extremities.  She has no chronic lymphedema.  She has had cellulitis involving her lower extremities previously.  She does not have diabetes and does not take immunosuppressing medications.  The patient has recognized that whenever she bumps her legs with her walker or furniture she will have a blister form at the sites of trauma.  She is concerned that these are going to become a nidus of infection and so she is trying to figure out how best to do with them.  In the past, she has recognized a fever and increased warmth of her skin as signs that she has cellulitis involving her skin.  She denies having these currently.  She feels relatively well.        Allergies:  Allergies   Allergen Reactions     Nkda [No Known Drug Allergies]      Problem List:    Patient Active Problem List    Diagnosis Date Noted     Sepsis (H) 10/23/2018     Priority: Medium     Cellulitis of abdominal wall 10/22/2018     Priority: Medium     Moderate episode of recurrent major depressive disorder (H) 03/23/2018     Priority: Medium     NARCISA (obstructive sleep apnea) 08/22/2017     Priority: Medium     Severe NARCISA with sleep-associated hypoxemia, with likely REM-related hypoventilation  Polysomnography - Test date 8/9/2017  AHI 45.9, basline SpO2 92.9%, mike SpO2 54.9%, time of SpO2 <= 88% of 31.7 minutes.  Severe desaturations and sustained hypoxemia confined to singular supine REM period (no lateral REM observed for comparison).  Also seen to have TCM increase by ~15 mmHg over baseline in supine REM, consistent with hypoventilation.  Pre-study VBG was WNL.  CPAP titrated to 10 cm H2O and appeared optimal, including supine REM, with normalization of SpO2 and TCM normalized to low-40's.  Seen to have frequent PLM's (64.3 / hour on diagnostic, 89.6 / hour on treatment, ~20% associated with cortical arousals).         Benign essential hypertension 02/15/2016      Priority: Medium     Peroneal tendon rupture 11/23/2015     Priority: Medium     Morbid obesity (H) 08/24/2015     Priority: Medium     Oct 2016:  Starting [employer] program in [place], goal to lose 140 lbs over next 2 years       Papanicolaou smear of cervix with low grade squamous intraepithelial lesion (LGSIL) 08/03/2014     Priority: Medium     7/29/2014:Pap--LSIL. Neg high risk HPV. Plan cotest in 1 year (if this is ASCUS or LSIL then colp). In reminders  8/20/15: Pap - ASCUS, Neg HPV. Plan colp  9/14/15: [place] Bx & ECC - negative. Plan cotest in 1 year.   10/20/16: NIL Pap, Neg HPV. Plan cotest in 3 years.        Eating disorder 08/25/2013     Priority: Medium     Binge-eating disorder; social phobia  See psychological assessment from [facility], Dr. Anupama Bowie, 8/14/2013  Problem list name updated by automated process. Provider to review       Leg edema 04/19/2013     Priority: Medium     Dyspnea and respiratory abnormality 04/19/2013     Priority: Medium     Problem list name updated by automated process. Provider to review       Anxiety 06/28/2011     Priority: Medium     Followed by neurologist       Restless legs 06/28/2011     Priority: Medium     CARDIOVASCULAR SCREENING; LDL GOAL LESS THAN 160 10/31/2010     Priority: Medium     Constipation 09/18/2006     Priority: Medium     9/18/2006    Has tried otc suppository and otc lax.   Problem list name updated by automated process. Provider to review       Multiple sclerosis (H) 08/29/2005     Priority: Medium     9/18/200pt dx with MS 2004--dx by neurolgist and is followed by Dr. Jeet Nicholson, also MS nurse Heather Thomas       Polycystic ovaries 08/29/2005     Priority: Medium     Diagnosed in [place], had facial hair, overweight, carb craving, records being requested, never on metformin        Past Medical History:    Past Medical History:   Diagnosis Date     ASCUS favor benign 8/2015     H/O colposcopy with cervical biopsy 9/14/15      "LSIL on Pap smear 7/2014     Multiple sclerosis (H) 8/29/2005     Shingles 10/2016     UNSPEC CONSTIPATION 9/18/2006     Past Surgical History:    Past Surgical History:   Procedure Laterality Date     CHOLECYSTECTOMY, LAPOROSCOPIC      Cholecystectomy, Laparoscopic     OPEN REDUCTION INTERNAL FIXATION TOE(S)  4/13/2012    Procedure:OPEN REDUCTION INTERNAL FIXATION TOE(S); Open reduction internal fixation right proximal fifth metatarsal fracture-   Anes-choice block; Surgeon:LEY, JEFFREY DUANE; Location:[facility] OR     Family History:    Family History   Problem Relation Age of Onset     Neurologic Disorder Father         MS     Heart Disease Father         irregular heart rate     Diabetes Father      Melanoma Father      Sleep Apnea Father      Cancer Maternal Grandfather         lung     Cancer Paternal Grandmother         stomach     Cancer Mother      Gynecology Maternal Aunt         PCOS     Social History:  Marital Status:  Single [1]  Social History     Tobacco Use     Smoking status: Never Smoker     Smokeless tobacco: Never Used   Substance Use Topics     Alcohol use: Yes     Comment: social     Drug use: No      Medications:      APRI 0.15-30 MG-MCG tablet   Cholecalciferol (VITAMIN D3 PO)   lisinopril (PRINIVIL/ZESTRIL) 20 MG tablet   PARoxetine (PAXIL) 30 MG tablet   acetaminophen (TYLENOL) 500 MG tablet   metoprolol succinate ER (TOPROL-XL) 25 MG 24 hr tablet   Ocrelizumab (OCREVUS IV)   order for DME     Review of Systems   All other systems reviewed and are negative.      PE   BP: (!) 153/89  Pulse: 104  Temp: 98.3  F (36.8  C)  Resp: 16  Height: 162.6 cm (5' 4\")  Weight: (!) 190.5 kg (420 lb)  SpO2: 97 %  Physical Exam   Constitutional: She is oriented to person, place, and time. No distress.   Morbidly obese.  Sitting in a wheelchair without difficulty.  Conversant and answering questions properly.   HENT:   Head: Normocephalic and atraumatic.   Eyes: Conjunctivae and EOM are normal.   Neck: Normal range " of motion.   Cardiovascular: Normal rate and regular rhythm.   Pulmonary/Chest: Effort normal.   Abdominal: Soft. There is no tenderness.   Musculoskeletal: Normal range of motion.   Neurological: She is alert and oriented to person, place, and time.   Skin: Skin is warm.   See photo documentation.  There is one large area of blistering on the right anterior lower leg.  No surrounding redness, warmth, tenderness that is new or different from her baseline.  She has a smaller blister on her left lateral leg.  Again, no surrounding redness, warmth, or tenderness that is new or different.  Chronic stasis dermatitis.   Psychiatric: She has a normal mood and affect. Her behavior is normal.   Vitals reviewed.                  ED COURSE and MDM   1515.  The patient has chronic lower extremity edema with stasis dermatitis.  She is experiencing blister formation with minimal trauma.  She is correct in thinking that these are potential areas of infection.  We talked about care of these blisters.  We talked about preventative measures.  We talked briefly about gastric bypass considerations.  No emergent need for antibiotics.  No emergent need for lab work-up or imaging at this time.    LABS  Labs Ordered and Resulted from Time of ED Arrival Up to the Time of Departure from the ED - No data to display    IMAGING  Images reviewed by me.  Radiology report also reviewed.  No orders to display       Procedures    Medications - No data to display      IMPRESSION       ICD-10-CM    1. Bilateral leg edema R60.0             Medication List      There are no discharge medications for this visit.                       Baljinder Newell MD  08/17/19 3544

## 2019-08-17 NOTE — ED NOTES
Blister on right lower shin leaking. Has several blisters that pop and open up from hitting wheeled walker.

## 2019-08-17 NOTE — ED AVS SNAPSHOT
[facility] Emergency Department  [address] [facility] [address]  Phone:  [phone]  Fax:  [phone]                                    Bess Enamorado   MRN: [id number]    Department:  [facility] Emergency Department   Date of Visit:  8/17/2019           After Visit Summary Signature Page    I have received my discharge instructions, and my questions have been answered. I have discussed any challenges I see with this plan with the nurse or doctor.    ..........................................................................................................................................  Patient/Patient Representative Signature      ..........................................................................................................................................  Patient Representative Print Name and Relationship to Patient    ..................................................               ................................................  Date                                   Time    ..........................................................................................................................................  Reviewed by Signature/Title    ...................................................              ..............................................  Date                                               Time          22EPIC Rev 08/18

## 2019-08-17 NOTE — DISCHARGE INSTRUCTIONS
Return to the Emergency Room if the following occurs:     Worsening pain, heat, fever >101, or for any concern at anytime.    Or, follow-up with the following provider as we discussed:     Return to your primary doctor as needed.    Medications discussed:    None new.  No changes.    If you received pain-relieving or sedating medication during your time in the ER, avoid alcohol, driving automobiles, or working with machinery.  Also, a responsible adult must stay with you.        Call the Nurse Advice Line at [phone] or [phone] for any concern at anytime.

## 2019-08-19 ENCOUNTER — PATIENT OUTREACH (OUTPATIENT)
Dept: CARE COORDINATION | Facility: CLINIC | Age: 44
End: 2019-08-19

## 2019-08-19 DIAGNOSIS — I89.0 LYMPHEDEMA OF BOTH LOWER EXTREMITIES: Primary | ICD-10-CM

## 2019-08-19 NOTE — PROGRESS NOTES
Clinic Care Coordination Contact  [facility]/Voicemail    Referral Source: ED Follow-Up    Clinical Data: Care Coordinator Outreach    Outreach attempted x 1.  Left message on voicemail with call back information and requested return call.    Patient was seen in ED on 8/17/19 for bilateral leg edema. Patient has chronic lymphedema. When she bumps her legs she develops blisters and is concerned for cellulitis.     Patient has chronic stasis dermatitis with blister formation with minimal trauma.  She was counseled about caring for the legs, discussed preventative measures, gastric bypass considerations (weight is over 400 lbs). No need for antibiotics or emergent lab work or imaging.         Plan: Care Coordinator will try to reach patient again in 1-2 business days.      Eduarda Anderson RN, [facility] - Primary Care Clinic RN Coordinator  [facility]   8/19/2019    11:17 AM  [phone]

## 2019-08-19 NOTE — LETTER
[facility]ARE COORDIN[facility]  [address]  [facility] [address]    August 21, 2019    Bess Enamorado  [address] [facility] [address]      Dear Bess,    I am a clinic care coordinator who works with Mikala Luna MD at [facility]. I wanted to introduce myself and provide you with my contact information for you to be able to call me with any questions or concerns. I wanted to introduce myself and provide you with my contact information so that you can call me with questions or concerns about your health care. Below is a description of clinic care coordination and how I can further assist you.     The clinic care coordinator is a registered nurse and/or  who understand the health care system. The goal of clinic care coordination is to help you manage your health and improve access to the [facility] system in the most efficient manner. The registered nurse can assist you in meeting your health care goals by providing education, coordinating services, and strengthening the communication among your providers. The  can assist you with financial, behavioral, psychosocial, chemical dependency, counseling, and/or psychiatric resources.    Please feel free to contact me at [phone], with any questions or concerns. We at [facility] are focused on providing you with the highest-quality healthcare experience possible and that all starts with you.     Sincerely,     Eduarda Anderson RN, [facility] - Primary Care Clinic RN Coordinator  [facility]     [phone]

## 2019-08-22 ENCOUNTER — TRANSFERRED RECORDS (OUTPATIENT)
Dept: HEALTH INFORMATION MANAGEMENT | Facility: CLINIC | Age: 44
End: 2019-08-22

## 2019-08-23 ENCOUNTER — MEDICAL CORRESPONDENCE (OUTPATIENT)
Dept: BONE DENSITY | Facility: CLINIC | Age: 44
End: 2019-08-23

## 2019-08-26 ENCOUNTER — OFFICE VISIT (OUTPATIENT)
Dept: OBGYN | Facility: CLINIC | Age: 44
End: 2019-08-26
Attending: OBSTETRICS & GYNECOLOGY
Payer: COMMERCIAL

## 2019-08-26 VITALS
BODY MASS INDEX: 51.91 KG/M2 | DIASTOLIC BLOOD PRESSURE: 77 MMHG | HEIGHT: 63 IN | HEART RATE: 103 BPM | WEIGHT: 293 LBS | SYSTOLIC BLOOD PRESSURE: 120 MMHG

## 2019-08-26 DIAGNOSIS — D27.1: Primary | ICD-10-CM

## 2019-08-26 DIAGNOSIS — Z87.42 HISTORY OF ABNORMAL CERVICAL PAP SMEAR: ICD-10-CM

## 2019-08-26 PROCEDURE — G0463 HOSPITAL OUTPT CLINIC VISIT: HCPCS | Mod: ZF

## 2019-08-26 ASSESSMENT — ANXIETY QUESTIONNAIRES
GAD7 TOTAL SCORE: 3
5. BEING SO RESTLESS THAT IT IS HARD TO SIT STILL: NOT AT ALL
7. FEELING AFRAID AS IF SOMETHING AWFUL MIGHT HAPPEN: NOT AT ALL
1. FEELING NERVOUS, ANXIOUS, OR ON EDGE: SEVERAL DAYS
6. BECOMING EASILY ANNOYED OR IRRITABLE: SEVERAL DAYS
2. NOT BEING ABLE TO STOP OR CONTROL WORRYING: SEVERAL DAYS
3. WORRYING TOO MUCH ABOUT DIFFERENT THINGS: NOT AT ALL

## 2019-08-26 ASSESSMENT — PATIENT HEALTH QUESTIONNAIRE - PHQ9
SUM OF ALL RESPONSES TO PHQ QUESTIONS 1-9: 3
5. POOR APPETITE OR OVEREATING: NOT AT ALL

## 2019-08-26 ASSESSMENT — MIFFLIN-ST. JEOR: SCORE: 2239.83

## 2019-08-26 NOTE — LETTER
8/26/2019       RE: Bess Enamorado  [address] [facility] [address]     Dear Colleague,    Thank you for referring your patient, Bess Enamorado, to the [facility] at [facility]. Please see a copy of my visit note below.      HPI:  Ms Enamorado is a 43 year old, who is here with her sister, with multiple medical problems including morbid obesity and multiple sclerosis, obstructive sleep apnea, hypertension, restless leg syndrome, leg edema, cellulitis of abdominal wall, eating disorder, depression anxiety.  She currently uses a walker and wheelchair due to her hip pain.  Patients records are available and reviewed at today's visit.  July 24, 2019 she had an MRI of her left hip for pain and stiffness.  There was an incidental finding of a 8.2 x 8.2 x 8.2 cm left ovarian teratoma.  She is completely asymptomatic.  She has not had any other recent scans of the abdomen to know if this is a new finding or not.  The MRI also showed a nondisplaced fracture of the pelvis and degenerative hip disease.  She is going to follow-up with her primary doctor in regards to this.  She did see her primary GYN Dr. Yee about 2 weeks ago for the teratoma.  Because of her complicated medical history Dr. Yee recommended that she seek follow-up care either at the [facility] or at [facility].    Of note is that she does have an abnormal Pap smear history.  She is overdue for her Pap smear.    She states that she has not been sexually active for 5 years and has had an STD screen since then.    Pap smear hx is as follows:   7/29/2014:Pap--LSIL. Neg high risk HPV.   8/20/15: Pap - ASCUS, Neg HPV.  COLPO 9/2015: Reactive; ECC neg    6/2013: she had a labial bx showing condyloma        Past Medical History:   Diagnosis Date     ASCUS favor benign 8/2015    Neg HPV     H/O colposcopy with cervical biopsy 9/14/15    Bx & ECC - negative     LSIL on Pap smear 7/2014    Neg  high risk HPV     Multiple sclerosis (H) 8/29/2005 9/18/200pt dx with MS 2004--dx by neurolgist and is followed by Dr. Steven Ayala 10/2016     UNSPEC CONSTIPATION 9/18/2006 9/18/2006   Has tried otc suppository and otc lax.      Past Surgical History:   Procedure Laterality Date     CHOLECYSTECTOMY, LAPOROSCOPIC      Cholecystectomy, Laparoscopic     OPEN REDUCTION INTERNAL FIXATION TOE(S)  4/13/2012    Procedure:OPEN REDUCTION INTERNAL FIXATION TOE(S); Open reduction internal fixation right proximal fifth metatarsal fracture-   Anes-choice block; Surgeon:LEY, JEFFREY DUANE; Location:[place] OR     Family History   Problem Relation Age of Onset     Neurologic Disorder Father         MS     Heart Disease Father         irregular heart rate     Diabetes Father      Melanoma Father      Sleep Apnea Father      Cancer Maternal Grandfather         lung     Cancer Paternal Grandmother         stomach     Cancer Mother      Gynecology Maternal Aunt         PCOS     Allergies: Nkda [no known drug allergies]    Current Outpatient Medications   Medication Sig Dispense Refill     acetaminophen (TYLENOL) 500 MG tablet Take 500-1,000 mg by mouth once as needed for mild pain       APRI 0.15-30 MG-MCG tablet TAKE 1 TABLET DAILY CONTINUOUSLY-OMIT PLACEBPO TABS UNTIL AFTER 3 MONTHS OF HORMONE TABS 112 tablet 3     Cholecalciferol (VITAMIN D3 PO) Take 10,000 Units by mouth daily        lisinopril (PRINIVIL/ZESTRIL) 20 MG tablet Take 1 tablet (20 mg) by mouth daily 90 tablet 3     metoprolol succinate ER (TOPROL-XL) 25 MG 24 hr tablet Take 1 tablet (25 mg) by mouth daily 90 tablet 1     Ocrelizumab (OCREVUS IV) Inject into the vein every 6 months In clinic       PARoxetine (PAXIL) 30 MG tablet TAKE 1 TABLET (30 MG) BY MOUTH EVERY MORNING 90 tablet 3     order for DME Equipment ordered: RESMED CPAP Mask type: Nasal Settings: 10 CM H2O         ROS: -10 point systems other than stress, urinary frequency and urgency, swelling  "and stiffness, sores, difficulty walking, anxiety depression.    EXAM:  Blood pressure 120/77, pulse 103, height 1.6 m (5' 3\"), weight (!) 162.1 kg (357 lb 4.8 oz), last menstrual period 07/29/2019.   BMI= Body mass index is 63.29 kg/m .    Pelvic - EG: normal adult female, BUS: within normal limits, Vagina: well rugated, no discharge,  Long alix used; but I was unable to visualize her cervix.  Bimanual exam limited by body habitus; no tenderness on exam.      ASSESSMENT/PLAN:  -Incidental finding of 8 cm left ovarian teratoma.  She is completely asymptomatic.  We are uncertain as to how long this has been present.  At this time I will discuss her situation with 1 of my partners at [facility] since I do not perform surgery.  I will call her back with this assessment when obtained.    -I was unable to obtain her Pap smear today due to her discomfort and body habitus.  If she does proceed with surgery a Pap smear will be done when she is under general anesthesia.        Again, thank you for allowing me to participate in the care of your patient.      Sincerely,    Alan Roberts MD      "

## 2019-08-26 NOTE — NURSING NOTE
Chief Complaint   Patient presents with     Consult For     Pelvic teratoma       See MURIEL Vernon 8/26/2019

## 2019-08-26 NOTE — PROGRESS NOTES
HPI:  Ms Enamorado is a 43 year old, who is here with her sister, with multiple medical problems including morbid obesity and multiple sclerosis, obstructive sleep apnea, hypertension, restless leg syndrome, leg edema, cellulitis of abdominal wall, eating disorder, depression anxiety.  She currently uses a walker and wheelchair due to her hip pain.  Patients records are available and reviewed at today's visit.  July 24, 2019 she had an MRI of her left hip for pain and stiffness.  There was an incidental finding of a 8.2 x 8.2 x 8.2 cm left ovarian teratoma.  She is completely asymptomatic.  She has not had any other recent scans of the abdomen to know if this is a new finding or not.  The MRI also showed a nondisplaced fracture of the pelvis and degenerative hip disease.  She is going to follow-up with her primary doctor in regards to this.  She did see her primary GYN Dr. Yee about 2 weeks ago for the teratoma.  Because of her complicated medical history Dr. Yee recommended that she seek follow-up care either at the [facility] or at [facility].    Of note is that she does have an abnormal Pap smear history.  She is overdue for her Pap smear.    She states that she has not been sexually active for 5 years and has had an STD screen since then.    Pap smear hx is as follows:   7/29/2014:Pap--LSIL. Neg high risk HPV.   8/20/15: Pap - ASCUS, Neg HPV.  COLPO 9/2015: Reactive; ECC neg    6/2013: she had a labial bx showing condyloma          Past Medical History:   Diagnosis Date     ASCUS favor benign 8/2015    Neg HPV     H/O colposcopy with cervical biopsy 9/14/15    Bx & ECC - negative     LSIL on Pap smear 7/2014    Neg high risk HPV     Multiple sclerosis (H) 8/29/2005 9/18/200pt dx with MS 2004--dx by neurolpau and is followed by Dr. Steven Ayala 10/2016     UNSPEC CONSTIPATION 9/18/2006 9/18/2006   Has tried otc suppository and otc lax.        Past Surgical History:   Procedure  "Laterality Date     CHOLECYSTECTOMY, LAPOROSCOPIC      Cholecystectomy, Laparoscopic     OPEN REDUCTION INTERNAL FIXATION TOE(S)  4/13/2012    Procedure:OPEN REDUCTION INTERNAL FIXATION TOE(S); Open reduction internal fixation right proximal fifth metatarsal fracture-   Anes-choice block; Surgeon:LEY, JEFFREY DUANE; Location:[place] OR       Family History   Problem Relation Age of Onset     Neurologic Disorder Father         MS     Heart Disease Father         irregular heart rate     Diabetes Father      Melanoma Father      Sleep Apnea Father      Cancer Maternal Grandfather         lung     Cancer Paternal Grandmother         stomach     Cancer Mother      Gynecology Maternal Aunt         PCOS       Allergies: Nkda [no known drug allergies]    Current Outpatient Medications   Medication Sig Dispense Refill     acetaminophen (TYLENOL) 500 MG tablet Take 500-1,000 mg by mouth once as needed for mild pain       APRI 0.15-30 MG-MCG tablet TAKE 1 TABLET DAILY CONTINUOUSLY-OMIT PLACEBPO TABS UNTIL AFTER 3 MONTHS OF HORMONE TABS 112 tablet 3     Cholecalciferol (VITAMIN D3 PO) Take 10,000 Units by mouth daily        lisinopril (PRINIVIL/ZESTRIL) 20 MG tablet Take 1 tablet (20 mg) by mouth daily 90 tablet 3     metoprolol succinate ER (TOPROL-XL) 25 MG 24 hr tablet Take 1 tablet (25 mg) by mouth daily 90 tablet 1     Ocrelizumab (OCREVUS IV) Inject into the vein every 6 months In clinic       PARoxetine (PAXIL) 30 MG tablet TAKE 1 TABLET (30 MG) BY MOUTH EVERY MORNING 90 tablet 3     order for DME Equipment ordered: RESMED CPAP Mask type: Nasal Settings: 10 CM H2O         ROS: -10 point systems other than stress, urinary frequency and urgency, swelling and stiffness, sores, difficulty walking, anxiety depression.    EXAM:  Blood pressure 120/77, pulse 103, height 1.6 m (5' 3\"), weight (!) 162.1 kg (357 lb 4.8 oz), last menstrual period 07/29/2019.   BMI= Body mass index is 63.29 kg/m .    Pelvic - EG: normal adult female, " BUS: within normal limits, Vagina: well rugated, no discharge,  Long alix used; but I was unable to visualize her cervix.  Bimanual exam limited by body habitus; no tenderness on exam.      ASSESSMENT/PLAN:  -Incidental finding of 8 cm left ovarian teratoma.  She is completely asymptomatic.  We are uncertain as to how long this has been present.  At this time I will discuss her situation with 1 of my partners at [facility] since I do not perform surgery.  I will call her back with this assessment when obtained.    -I was unable to obtain her Pap smear today due to her discomfort and body habitus.  If she does proceed with surgery a Pap smear will be done when she is under general anesthesia.

## 2019-08-27 ASSESSMENT — ANXIETY QUESTIONNAIRES: GAD7 TOTAL SCORE: 3

## 2019-09-06 ENCOUNTER — TELEPHONE (OUTPATIENT)
Dept: OBGYN | Facility: CLINIC | Age: 44
End: 2019-09-06

## 2019-09-06 NOTE — TELEPHONE ENCOUNTER
As per Dr. Broussard who reviewed Bess's chart (and I am in total agreement)  At this time, in view of Bess's complicated medical history and risks for a surgical procedure)  it would be wise to avoid surgery at this time if possible.  She was advised that the dermoid cyst should be followed on an annual basis hopefully with ultrasound.  She can do this via her primary care provider and if they have any questions they can certainly forward her imaging to us.  If the dermoid cyst becomes quite large or painful in any way this would point our recommendation more toward surgery.  I discussed this on the phone today with MRI and she seemed comfortable with this approach.

## 2019-11-04 ENCOUNTER — HEALTH MAINTENANCE LETTER (OUTPATIENT)
Age: 44
End: 2019-11-04

## 2020-02-13 ENCOUNTER — OFFICE VISIT (OUTPATIENT)
Dept: FAMILY MEDICINE | Facility: CLINIC | Age: 45
End: 2020-02-13
Payer: COMMERCIAL

## 2020-02-13 DIAGNOSIS — I89.0 SECONDARY LYMPHEDEMA: ICD-10-CM

## 2020-02-13 DIAGNOSIS — E28.2 POLYCYSTIC OVARIES: ICD-10-CM

## 2020-02-13 DIAGNOSIS — R82.90 NONSPECIFIC FINDING ON EXAMINATION OF URINE: ICD-10-CM

## 2020-02-13 DIAGNOSIS — I10 BENIGN ESSENTIAL HYPERTENSION: Chronic | ICD-10-CM

## 2020-02-13 DIAGNOSIS — F33.1 MODERATE EPISODE OF RECURRENT MAJOR DEPRESSIVE DISORDER (H): ICD-10-CM

## 2020-02-13 DIAGNOSIS — R35.0 URINARY FREQUENCY: Primary | ICD-10-CM

## 2020-02-13 DIAGNOSIS — Z30.41 ENCOUNTER FOR SURVEILLANCE OF CONTRACEPTIVE PILLS: ICD-10-CM

## 2020-02-13 DIAGNOSIS — R00.0 SINUS TACHYCARDIA: ICD-10-CM

## 2020-02-13 DIAGNOSIS — I89.0 ELEPHANTIASIS: ICD-10-CM

## 2020-02-13 LAB
ALBUMIN SERPL-MCNC: 3.4 G/DL (ref 3.4–5)
ALBUMIN UR-MCNC: ABNORMAL MG/DL
ALP SERPL-CCNC: 52 U/L (ref 40–150)
ALT SERPL W P-5'-P-CCNC: 30 U/L (ref 0–50)
ANION GAP SERPL CALCULATED.3IONS-SCNC: 4 MMOL/L (ref 3–14)
APPEARANCE UR: ABNORMAL
AST SERPL W P-5'-P-CCNC: 23 U/L (ref 0–45)
BACTERIA #/AREA URNS HPF: ABNORMAL /HPF
BILIRUB SERPL-MCNC: 1.1 MG/DL (ref 0.2–1.3)
BILIRUB UR QL STRIP: NEGATIVE
BUN SERPL-MCNC: 18 MG/DL (ref 7–30)
CALCIUM SERPL-MCNC: 9.4 MG/DL (ref 8.5–10.1)
CHLORIDE SERPL-SCNC: 104 MMOL/L (ref 94–109)
CO2 SERPL-SCNC: 28 MMOL/L (ref 20–32)
COLOR UR AUTO: YELLOW
CREAT SERPL-MCNC: 0.69 MG/DL (ref 0.52–1.04)
GFR SERPL CREATININE-BSD FRML MDRD: >90 ML/MIN/{1.73_M2}
GLUCOSE SERPL-MCNC: 96 MG/DL (ref 70–99)
GLUCOSE UR STRIP-MCNC: NEGATIVE MG/DL
HBA1C MFR BLD: 5.7 % (ref 0–5.6)
HGB UR QL STRIP: NEGATIVE
KETONES UR STRIP-MCNC: NEGATIVE MG/DL
LEUKOCYTE ESTERASE UR QL STRIP: ABNORMAL
NITRATE UR QL: NEGATIVE
NON-SQ EPI CELLS #/AREA URNS LPF: ABNORMAL /LPF
PH UR STRIP: 7 PH (ref 5–7)
POTASSIUM SERPL-SCNC: 3.9 MMOL/L (ref 3.4–5.3)
PROT SERPL-MCNC: 7.7 G/DL (ref 6.8–8.8)
RBC #/AREA URNS AUTO: ABNORMAL /HPF
SODIUM SERPL-SCNC: 136 MMOL/L (ref 133–144)
SOURCE: ABNORMAL
SP GR UR STRIP: 1.02 (ref 1–1.03)
UROBILINOGEN UR STRIP-ACNC: 2 EU/DL (ref 0.2–1)
WBC #/AREA URNS AUTO: ABNORMAL /HPF

## 2020-02-13 PROCEDURE — 81001 URINALYSIS AUTO W/SCOPE: CPT | Performed by: FAMILY MEDICINE

## 2020-02-13 PROCEDURE — 99214 OFFICE O/P EST MOD 30 MIN: CPT | Performed by: FAMILY MEDICINE

## 2020-02-13 PROCEDURE — 83036 HEMOGLOBIN GLYCOSYLATED A1C: CPT | Performed by: FAMILY MEDICINE

## 2020-02-13 PROCEDURE — 87086 URINE CULTURE/COLONY COUNT: CPT | Performed by: FAMILY MEDICINE

## 2020-02-13 PROCEDURE — 80053 COMPREHEN METABOLIC PANEL: CPT | Performed by: FAMILY MEDICINE

## 2020-02-13 PROCEDURE — 36415 COLL VENOUS BLD VENIPUNCTURE: CPT | Performed by: FAMILY MEDICINE

## 2020-02-13 RX ORDER — LISINOPRIL 20 MG/1
20 TABLET ORAL DAILY
Qty: 90 TABLET | Refills: 3 | Status: SHIPPED | OUTPATIENT
Start: 2020-02-13 | End: 2021-04-15

## 2020-02-13 RX ORDER — METOPROLOL SUCCINATE 25 MG/1
25 TABLET, EXTENDED RELEASE ORAL DAILY
Qty: 90 TABLET | Refills: 1 | Status: SHIPPED | OUTPATIENT
Start: 2020-02-13 | End: 2021-04-15

## 2020-02-13 RX ORDER — DESOGESTREL AND ETHINYL ESTRADIOL 0.15-0.03
KIT ORAL
Qty: 112 TABLET | Refills: 3 | Status: SHIPPED | OUTPATIENT
Start: 2020-02-13 | End: 2021-04-15

## 2020-02-13 ASSESSMENT — PATIENT HEALTH QUESTIONNAIRE - PHQ9
5. POOR APPETITE OR OVEREATING: MORE THAN HALF THE DAYS
SUM OF ALL RESPONSES TO PHQ QUESTIONS 1-9: 12

## 2020-02-13 ASSESSMENT — ANXIETY QUESTIONNAIRES
7. FEELING AFRAID AS IF SOMETHING AWFUL MIGHT HAPPEN: NOT AT ALL
2. NOT BEING ABLE TO STOP OR CONTROL WORRYING: MORE THAN HALF THE DAYS
6. BECOMING EASILY ANNOYED OR IRRITABLE: SEVERAL DAYS
GAD7 TOTAL SCORE: 8
5. BEING SO RESTLESS THAT IT IS HARD TO SIT STILL: NOT AT ALL
3. WORRYING TOO MUCH ABOUT DIFFERENT THINGS: SEVERAL DAYS
1. FEELING NERVOUS, ANXIOUS, OR ON EDGE: MORE THAN HALF THE DAYS

## 2020-02-13 ASSESSMENT — PAIN SCALES - GENERAL: PAINLEVEL: SEVERE PAIN (6)

## 2020-02-13 NOTE — PATIENT INSTRUCTIONS
Our Clinic hours are:  Mondays    7:20 am - 7 pm  Tues -  Fri  7:20 am - 5 pm    Clinic Phone: [phone]    The clinic lab opens at 7:30 am Mon - Fri and appointments are required.    [facility]. [phone]  Monday  8 am - 7pm  Tues - Fri 8 am - 5:30 pm

## 2020-02-13 NOTE — PROGRESS NOTES
Subjective     Bess Enamorado is a 44 year old female with MS, morbid obesity and hypertension  who presents to clinic today for the following health issues:    HPI   Chief Complaint   Patient presents with     Patient Request     Graciet would like to rule out diabetes. Patient has always had urinary frequency and odor. Patient also noticed her urine being pale and some slimy substance. Patient had insurance change and hasn't been following up with her eating disorder. Patient doesn't have constant increase thrist.  Last summer blood sugar was acceptable.     Medication Reconciliation     Patient wasn't taken medication and restarted birthcontrol and blood pressure 3 weeks ago     Medication Reconciliation     stopped anxiety medications - didn't really notice a difference in her anxiety after stopping.  She used to have a lot of obsessions about checking, a lot of worrying and that has resolved. Now notices she has more depressive symptoms.      Medication Reconciliation     stopped vitamin D     Leg Swelling     Patient got a infection 3 years and was treated and now patient feels like the last 1.5 years her lower leg is swollen and tight with bumps that are weeping and she is unable to wrap her legs herself. Has done lymphedema tx in the past but is unable to get the compression stockings on herself.  She states that she told the therapist this - she would likely be a better candidate for velcro type wraps.       Urinary symptoms:  She has frequency and urgency.  Sometimes the urgency results in the inability to make it to the bathroom without having incontinence.  She works as a  and she is in a wheelchair during the day.  The bathroom is not far but she can sometimes not make it on time.  Wears a pull up.   Has seen urology in the past but only once, sounds like a anticholinergic was tried and wasn't effective for her, caused dry mouth, etc.      Medical, social, surgical, and family  histories reviewed.    Patient Active Problem List    Diagnosis Date Noted     History of abnormal cervical Pap smear 08/26/2019     Priority: Medium     Sepsis (H) 10/23/2018     Priority: Medium     Cellulitis of abdominal wall 10/22/2018     Priority: Medium     Moderate episode of recurrent major depressive disorder (H) 03/23/2018     Priority: Medium     NARCISA (obstructive sleep apnea) 08/22/2017     Priority: Medium     Severe NARCISA with sleep-associated hypoxemia, with likely REM-related hypoventilation  Polysomnography - Test date 8/9/2017  AHI 45.9, basline SpO2 92.9%, mike SpO2 54.9%, time of SpO2 <= 88% of 31.7 minutes.  Severe desaturations and sustained hypoxemia confined to singular supine REM period (no lateral REM observed for comparison).  Also seen to have TCM increase by ~15 mmHg over baseline in supine REM, consistent with hypoventilation.  Pre-study VBG was WNL.  CPAP titrated to 10 cm H2O and appeared optimal, including supine REM, with normalization of SpO2 and TCM normalized to low-40's.  Seen to have frequent PLM's (64.3 / hour on diagnostic, 89.6 / hour on treatment, ~20% associated with cortical arousals).         Benign essential hypertension 02/15/2016     Priority: Medium     Peroneal tendon rupture 11/23/2015     Priority: Medium     Morbid obesity (H) 08/24/2015     Priority: Medium     Oct 2016:  Starting [employer] program in [place], goal to lose 140 lbs over next 2 years       Papanicolaou smear of cervix with low grade squamous intraepithelial lesion (LGSIL) 08/03/2014     Priority: Medium     7/29/2014:Pap--LSIL. Neg high risk HPV. Plan cotest in 1 year (if this is ASCUS or LSIL then colp). In reminders  8/20/15: Pap - ASCUS, Neg HPV. Plan colp  9/14/15: [place] Bx & ECC - negative. Plan cotest in 1 year.   10/20/16: NIL Pap, Neg HPV. Plan cotest in 3 years.        Eating disorder 08/25/2013     Priority: Medium     Binge-eating disorder; social phobia  See psychological assessment  from [facility], Dr. Anupama Bowie, 8/14/2013  Problem list name updated by automated process. Provider to review       Leg edema 04/19/2013     Priority: Medium     Dyspnea and respiratory abnormality 04/19/2013     Priority: Medium     Problem list name updated by automated process. Provider to review       Anxiety 06/28/2011     Priority: Medium     Followed by neurologist       Restless legs 06/28/2011     Priority: Medium     CARDIOVASCULAR SCREENING; LDL GOAL LESS THAN 160 10/31/2010     Priority: Medium     Constipation 09/18/2006     Priority: Medium     9/18/2006    Has tried otc suppository and otc lax.   Problem list name updated by automated process. Provider to review       Multiple sclerosis (H) 08/29/2005     Priority: Medium     9/18/200pt dx with MS 2004--dx by neurolgist and is followed by Dr. Jeet Nicholson, also MS nurse Heather Thomas       Polycystic ovaries 08/29/2005     Priority: Medium     Diagnosed in [place], had facial hair, overweight, carb craving, records being requested, never on metformin         Current Outpatient Medications:      desogestrel-ethinyl estradiol (APRI) 0.15-30 MG-MCG tablet, TAKE 1 TABLET DAILY CONTINUOUSLY-OMIT PLACEBPO TABS UNTIL AFTER 3 MONTHS OF HORMONE TABS, Disp: 112 tablet, Rfl: 3     lisinopril (PRINIVIL/ZESTRIL) 20 MG tablet, Take 1 tablet (20 mg) by mouth daily, Disp: 90 tablet, Rfl: 3     metoprolol succinate ER (TOPROL-XL) 25 MG 24 hr tablet, Take 1 tablet (25 mg) by mouth daily, Disp: 90 tablet, Rfl: 1     Ocrelizumab (OCREVUS IV), Inject into the vein every 6 months In clinic, Disp: , Rfl:      sertraline (ZOLOFT) 50 MG tablet, Take 1 tablet (50 mg) by mouth daily, Disp: 30 tablet, Rfl: 0     acetaminophen (TYLENOL) 500 MG tablet, Take 500-1,000 mg by mouth once as needed for mild pain, Disp: , Rfl:      Cholecalciferol (VITAMIN D3 PO), Take 10,000 Units by mouth daily , Disp: , Rfl:      order for DME, Equipment ordered: RESMED CPAP Mask  "type: Nasal Settings: 10 CM H2O, Disp: , Rfl:       ROS:      OBJECTIVE:  /76   Pulse 106   Temp 98.4  F (36.9  C) (Tympanic)   Resp 20   Ht 1.6 m (5' 3\")   LMP 01/27/2020   SpO2 99%   Breastfeeding No   BMI 63.29 kg/m    EXAM:  GENERAL APPEARANCE: Alert, no acute distress  RESP: lungs clear to auscultation   CV: normal rate, regular rhythm, no murmur or gallop  ABDOMEN: soft, no organomegaly, masses or tenderness  MS/skin: very large lower extremities, there is woody lymphedema and elephantiasis type changes with nodular appearance.  Slight weeping.  No current cellulitis       ASSESSMENT/PLAN:    (R35.0) Urinary frequency  (primary encounter diagnosis)  Comment:  R/o infection  Plan: *UA reflex to Microscopic and Culture ([facility] (except [facility] and         [place]), Hemoglobin A1c, Urine Microscopic,         Urine Culture Aerobic Bacterial             (I10) Benign essential hypertension  Comment: well controlled  Plan: metoprolol succinate ER (TOPROL-XL) 25 MG 24 hr        tablet, lisinopril (PRINIVIL/ZESTRIL) 20 MG         tablet, Comprehensive metabolic panel             (R00.0) Sinus tachycardia  Comment:    Plan: metoprolol succinate ER (TOPROL-XL) 25 MG 24 hr        tablet             (E28.2) Polycystic ovaries  Comment:    Plan: desogestrel-ethinyl estradiol (APRI) 0.15-30         MG-MCG tablet             (Z30.41) Encounter for surveillance of contraceptive pills  Comment:    Plan: desogestrel-ethinyl estradiol (APRI) 0.15-30         MG-MCG tablet             (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  Comment: needs lymphedema treatment long term  Plan: PHYSICAL THERAPY REFERRAL              (F33.1) Moderate episode of recurrent major depressive disorder (H)  Comment:    Plan: sertraline (ZOLOFT) 50 MG tablet         New medication, previously on paxil but stopped six months ago    (R82.90) Nonspecific finding on examination of " urine  Comment:  Await culture, could be non-sterile catch  Plan: Urine Culture Aerobic Bacterial           Multiple sclerosis - under the care of neurology    Follow up in 1 month by phone for the sertraline  See me in 2-3 months for recheck  Follow-up with neurologist for the MS    Mikala Luna M.D.      Patient Instructions       Our Clinic hours are:  Mondays    7:20 am - 7 pm  Tues -  Fri  7:20 am - 5 pm    Clinic Phone: [phone]    The clinic lab opens at 7:30 am Mon - Fri and appointments are required.    [facility]  Ph. [phone]  Monday  8 am - 7pm  Tues - Fri 8 am - 5:30 pm

## 2020-02-14 VITALS
SYSTOLIC BLOOD PRESSURE: 120 MMHG | RESPIRATION RATE: 20 BRPM | BODY MASS INDEX: 63.29 KG/M2 | DIASTOLIC BLOOD PRESSURE: 76 MMHG | HEIGHT: 63 IN | HEART RATE: 90 BPM | TEMPERATURE: 98.4 F | OXYGEN SATURATION: 99 %

## 2020-02-14 LAB
BACTERIA SPEC CULT: NORMAL
Lab: NORMAL
SPECIMEN SOURCE: NORMAL

## 2020-02-14 ASSESSMENT — ANXIETY QUESTIONNAIRES: GAD7 TOTAL SCORE: 8

## 2020-02-14 NOTE — RESULT ENCOUNTER NOTE
Urine shows some white blood cells, I would like to see if there is a positive culture before treating.  I should have this information by Monday and will let you know.     HgbA1c is 5.7% which is in the pre-diabetic range.  Cutting back on carbohydrates/sweets is essential in preventing diabetes.      Kidney and liver function are normal.    Mikala Luna M.D.

## 2020-02-20 ENCOUNTER — MYC MEDICAL ADVICE (OUTPATIENT)
Dept: FAMILY MEDICINE | Facility: CLINIC | Age: 45
End: 2020-02-20

## 2020-02-20 NOTE — LETTER
[facility]  [address]  [facility] [address]  [phone]        February 24, 2020    Regarding:  Bess Enamorado  [address] [facility] [address]              To Whom It May Concern;      Bess Enamorado is under my medical care.  She has been suffering from lymphedema of the lower legs and this has caused some weeping of lymphatic fluid.  She has found a way to wrap her legs to help avoid the fluid from leaking onto the floor, but she has also been referred on to a lymphedema specialist to help care for this condition.      Bess should be able to work while dealing with this issue.  She may need time off for attending medical appointments and lymphedema therapy.  She may need extra time during the day to change dressings/wraps on her legs to avoid fluid leakage.            Sincerely,        Mikala Luna MD

## 2020-02-24 ENCOUNTER — HOSPITAL ENCOUNTER (INPATIENT)
Facility: CLINIC | Age: 45
LOS: 3 days | Discharge: HOME HEALTH | DRG: 872 | End: 2020-02-27
Attending: EMERGENCY MEDICINE | Admitting: INTERNAL MEDICINE
Payer: COMMERCIAL

## 2020-02-24 ENCOUNTER — MYC MEDICAL ADVICE (OUTPATIENT)
Dept: FAMILY MEDICINE | Facility: CLINIC | Age: 45
End: 2020-02-24

## 2020-02-24 DIAGNOSIS — R65.20 SEVERE SEPSIS (H): ICD-10-CM

## 2020-02-24 DIAGNOSIS — L03.116 CELLULITIS OF LEFT LOWER EXTREMITY: ICD-10-CM

## 2020-02-24 DIAGNOSIS — A41.9 SEVERE SEPSIS (H): ICD-10-CM

## 2020-02-24 PROBLEM — L03.90 CELLULITIS: Status: ACTIVE | Noted: 2020-02-24

## 2020-02-24 LAB
ALBUMIN SERPL-MCNC: 3.5 G/DL (ref 3.4–5)
ALBUMIN UR-MCNC: NEGATIVE MG/DL
ALP SERPL-CCNC: 58 U/L (ref 40–150)
ALT SERPL W P-5'-P-CCNC: 22 U/L (ref 0–50)
ANION GAP SERPL CALCULATED.3IONS-SCNC: 8 MMOL/L (ref 3–14)
APPEARANCE UR: CLEAR
AST SERPL W P-5'-P-CCNC: 18 U/L (ref 0–45)
BASOPHILS # BLD AUTO: 0.1 10E9/L (ref 0–0.2)
BASOPHILS NFR BLD AUTO: 0.3 %
BILIRUB SERPL-MCNC: 1 MG/DL (ref 0.2–1.3)
BILIRUB UR QL STRIP: NEGATIVE
BUN SERPL-MCNC: 14 MG/DL (ref 7–30)
CALCIUM SERPL-MCNC: 10.1 MG/DL (ref 8.5–10.1)
CHLORIDE SERPL-SCNC: 103 MMOL/L (ref 94–109)
CO2 SERPL-SCNC: 25 MMOL/L (ref 20–32)
COLOR UR AUTO: YELLOW
CREAT SERPL-MCNC: 0.65 MG/DL (ref 0.52–1.04)
DIFFERENTIAL METHOD BLD: ABNORMAL
EOSINOPHIL # BLD AUTO: 0 10E9/L (ref 0–0.7)
EOSINOPHIL NFR BLD AUTO: 0 %
ERYTHROCYTE [DISTWIDTH] IN BLOOD BY AUTOMATED COUNT: 15.4 % (ref 10–15)
FLUAV+FLUBV AG SPEC QL: NEGATIVE
FLUAV+FLUBV AG SPEC QL: NEGATIVE
GFR SERPL CREATININE-BSD FRML MDRD: >90 ML/MIN/{1.73_M2}
GLUCOSE SERPL-MCNC: 124 MG/DL (ref 70–99)
GLUCOSE UR STRIP-MCNC: NEGATIVE MG/DL
HCT VFR BLD AUTO: 45.7 % (ref 35–47)
HGB BLD-MCNC: 14.6 G/DL (ref 11.7–15.7)
HGB UR QL STRIP: ABNORMAL
IMM GRANULOCYTES # BLD: 0.1 10E9/L (ref 0–0.4)
IMM GRANULOCYTES NFR BLD: 0.4 %
KETONES UR STRIP-MCNC: NEGATIVE MG/DL
LACTATE BLD-SCNC: 1.1 MMOL/L (ref 0.7–2)
LACTATE BLD-SCNC: 4.4 MMOL/L (ref 0.7–2)
LEUKOCYTE ESTERASE UR QL STRIP: NEGATIVE
LYMPHOCYTES # BLD AUTO: 0.3 10E9/L (ref 0.8–5.3)
LYMPHOCYTES NFR BLD AUTO: 1.3 %
MCH RBC QN AUTO: 26.8 PG (ref 26.5–33)
MCHC RBC AUTO-ENTMCNC: 31.9 G/DL (ref 31.5–36.5)
MCV RBC AUTO: 84 FL (ref 78–100)
MONOCYTES # BLD AUTO: 0.8 10E9/L (ref 0–1.3)
MONOCYTES NFR BLD AUTO: 3.9 %
MUCOUS THREADS #/AREA URNS LPF: PRESENT /LPF
NEUTROPHILS # BLD AUTO: 19.6 10E9/L (ref 1.6–8.3)
NEUTROPHILS NFR BLD AUTO: 94.1 %
NITRATE UR QL: NEGATIVE
NRBC # BLD AUTO: 0 10*3/UL
NRBC BLD AUTO-RTO: 0 /100
PH UR STRIP: 5 PH (ref 5–7)
PLATELET # BLD AUTO: 329 10E9/L (ref 150–450)
POTASSIUM SERPL-SCNC: 3.8 MMOL/L (ref 3.4–5.3)
PROT SERPL-MCNC: 8.1 G/DL (ref 6.8–8.8)
RBC # BLD AUTO: 5.45 10E12/L (ref 3.8–5.2)
RBC #/AREA URNS AUTO: 1 /HPF (ref 0–2)
SODIUM SERPL-SCNC: 136 MMOL/L (ref 133–144)
SOURCE: ABNORMAL
SP GR UR STRIP: 1.01 (ref 1–1.03)
SPECIMEN SOURCE: NORMAL
SQUAMOUS #/AREA URNS AUTO: <1 /HPF (ref 0–1)
UROBILINOGEN UR STRIP-MCNC: 0 MG/DL (ref 0–2)
WBC # BLD AUTO: 20.9 10E9/L (ref 4–11)
WBC #/AREA URNS AUTO: 4 /HPF (ref 0–5)

## 2020-02-24 PROCEDURE — 85025 COMPLETE CBC W/AUTO DIFF WBC: CPT | Performed by: FAMILY MEDICINE

## 2020-02-24 PROCEDURE — 99291 CRITICAL CARE FIRST HOUR: CPT | Mod: 25 | Performed by: EMERGENCY MEDICINE

## 2020-02-24 PROCEDURE — 12000000 ZZH R&B MED SURG/OB

## 2020-02-24 PROCEDURE — 99223 1ST HOSP IP/OBS HIGH 75: CPT | Mod: AI | Performed by: INTERNAL MEDICINE

## 2020-02-24 PROCEDURE — 96365 THER/PROPH/DIAG IV INF INIT: CPT | Performed by: EMERGENCY MEDICINE

## 2020-02-24 PROCEDURE — 25800030 ZZH RX IP 258 OP 636: Performed by: EMERGENCY MEDICINE

## 2020-02-24 PROCEDURE — 87804 INFLUENZA ASSAY W/OPTIC: CPT | Performed by: EMERGENCY MEDICINE

## 2020-02-24 PROCEDURE — 25000132 ZZH RX MED GY IP 250 OP 250 PS 637: Performed by: INTERNAL MEDICINE

## 2020-02-24 PROCEDURE — 83605 ASSAY OF LACTIC ACID: CPT | Performed by: FAMILY MEDICINE

## 2020-02-24 PROCEDURE — 83605 ASSAY OF LACTIC ACID: CPT | Performed by: EMERGENCY MEDICINE

## 2020-02-24 PROCEDURE — 87040 BLOOD CULTURE FOR BACTERIA: CPT | Performed by: EMERGENCY MEDICINE

## 2020-02-24 PROCEDURE — 99285 EMERGENCY DEPT VISIT HI MDM: CPT | Mod: 25 | Performed by: EMERGENCY MEDICINE

## 2020-02-24 PROCEDURE — 80053 COMPREHEN METABOLIC PANEL: CPT | Performed by: FAMILY MEDICINE

## 2020-02-24 PROCEDURE — 25000128 H RX IP 250 OP 636: Performed by: EMERGENCY MEDICINE

## 2020-02-24 PROCEDURE — 96366 THER/PROPH/DIAG IV INF ADDON: CPT | Performed by: EMERGENCY MEDICINE

## 2020-02-24 PROCEDURE — 25000132 ZZH RX MED GY IP 250 OP 250 PS 637: Performed by: EMERGENCY MEDICINE

## 2020-02-24 PROCEDURE — 76882 US LMTD JT/FCL EVL NVASC XTR: CPT | Performed by: EMERGENCY MEDICINE

## 2020-02-24 PROCEDURE — 82565 ASSAY OF CREATININE: CPT | Performed by: INTERNAL MEDICINE

## 2020-02-24 PROCEDURE — 81001 URINALYSIS AUTO W/SCOPE: CPT | Performed by: EMERGENCY MEDICINE

## 2020-02-24 PROCEDURE — 25800030 ZZH RX IP 258 OP 636: Performed by: INTERNAL MEDICINE

## 2020-02-24 PROCEDURE — 76882 US LMTD JT/FCL EVL NVASC XTR: CPT | Mod: 26 | Performed by: EMERGENCY MEDICINE

## 2020-02-24 PROCEDURE — 87800 DETECT AGNT MULT DNA DIREC: CPT | Performed by: EMERGENCY MEDICINE

## 2020-02-24 PROCEDURE — 25000128 H RX IP 250 OP 636: Performed by: INTERNAL MEDICINE

## 2020-02-24 PROCEDURE — 87186 SC STD MICRODIL/AGAR DIL: CPT | Performed by: EMERGENCY MEDICINE

## 2020-02-24 PROCEDURE — 87077 CULTURE AEROBIC IDENTIFY: CPT | Performed by: EMERGENCY MEDICINE

## 2020-02-24 RX ORDER — CEFTRIAXONE SODIUM 1 G/50ML
1 INJECTION, SOLUTION INTRAVENOUS EVERY 24 HOURS
Status: DISCONTINUED | OUTPATIENT
Start: 2020-02-24 | End: 2020-02-25

## 2020-02-24 RX ORDER — AMOXICILLIN 250 MG
1 CAPSULE ORAL 2 TIMES DAILY PRN
Status: DISCONTINUED | OUTPATIENT
Start: 2020-02-24 | End: 2020-02-27 | Stop reason: HOSPADM

## 2020-02-24 RX ORDER — POTASSIUM CHLORIDE 1.5 G/1.58G
20-40 POWDER, FOR SOLUTION ORAL
Status: DISCONTINUED | OUTPATIENT
Start: 2020-02-24 | End: 2020-02-24 | Stop reason: CLARIF

## 2020-02-24 RX ORDER — DESOGESTREL AND ETHINYL ESTRADIOL 0.15-0.03
1 KIT ORAL
Status: DISCONTINUED | OUTPATIENT
Start: 2020-02-24 | End: 2020-02-27 | Stop reason: HOSPADM

## 2020-02-24 RX ORDER — TIZANIDINE 2 MG/1
2 TABLET ORAL EVERY 6 HOURS PRN
Status: DISCONTINUED | OUTPATIENT
Start: 2020-02-24 | End: 2020-02-27 | Stop reason: HOSPADM

## 2020-02-24 RX ORDER — BISACODYL 10 MG
10 SUPPOSITORY, RECTAL RECTAL DAILY PRN
Status: DISCONTINUED | OUTPATIENT
Start: 2020-02-24 | End: 2020-02-27 | Stop reason: HOSPADM

## 2020-02-24 RX ORDER — ONDANSETRON 4 MG/1
4 TABLET, ORALLY DISINTEGRATING ORAL EVERY 6 HOURS PRN
Status: DISCONTINUED | OUTPATIENT
Start: 2020-02-24 | End: 2020-02-24

## 2020-02-24 RX ORDER — CALCIUM CARBONATE 500 MG/1
1000 TABLET, CHEWABLE ORAL 4 TIMES DAILY PRN
Status: DISCONTINUED | OUTPATIENT
Start: 2020-02-24 | End: 2020-02-27 | Stop reason: HOSPADM

## 2020-02-24 RX ORDER — SODIUM CHLORIDE 9 MG/ML
INJECTION, SOLUTION INTRAVENOUS CONTINUOUS
Status: DISCONTINUED | OUTPATIENT
Start: 2020-02-24 | End: 2020-02-26 | Stop reason: CLARIF

## 2020-02-24 RX ORDER — IBUPROFEN 600 MG/1
600 TABLET, FILM COATED ORAL EVERY 6 HOURS PRN
Status: DISCONTINUED | OUTPATIENT
Start: 2020-02-24 | End: 2020-02-27 | Stop reason: HOSPADM

## 2020-02-24 RX ORDER — ACETAMINOPHEN 325 MG/1
650 TABLET ORAL EVERY 4 HOURS PRN
Status: DISCONTINUED | OUTPATIENT
Start: 2020-02-24 | End: 2020-02-24

## 2020-02-24 RX ORDER — NALOXONE HYDROCHLORIDE 0.4 MG/ML
.1-.4 INJECTION, SOLUTION INTRAMUSCULAR; INTRAVENOUS; SUBCUTANEOUS
Status: DISCONTINUED | OUTPATIENT
Start: 2020-02-24 | End: 2020-02-27 | Stop reason: HOSPADM

## 2020-02-24 RX ORDER — PROCHLORPERAZINE 25 MG
25 SUPPOSITORY, RECTAL RECTAL EVERY 12 HOURS PRN
Status: DISCONTINUED | OUTPATIENT
Start: 2020-02-24 | End: 2020-02-24

## 2020-02-24 RX ORDER — PROCHLORPERAZINE MALEATE 5 MG
10 TABLET ORAL EVERY 6 HOURS PRN
Status: DISCONTINUED | OUTPATIENT
Start: 2020-02-24 | End: 2020-02-27 | Stop reason: HOSPADM

## 2020-02-24 RX ORDER — POTASSIUM CHLORIDE 29.8 MG/ML
20 INJECTION INTRAVENOUS
Status: DISCONTINUED | OUTPATIENT
Start: 2020-02-24 | End: 2020-02-24 | Stop reason: CLARIF

## 2020-02-24 RX ORDER — MAGNESIUM SULFATE HEPTAHYDRATE 40 MG/ML
4 INJECTION, SOLUTION INTRAVENOUS EVERY 4 HOURS PRN
Status: DISCONTINUED | OUTPATIENT
Start: 2020-02-24 | End: 2020-02-27 | Stop reason: HOSPADM

## 2020-02-24 RX ORDER — IBUPROFEN 600 MG/1
600 TABLET, FILM COATED ORAL EVERY 6 HOURS PRN
Status: DISCONTINUED | OUTPATIENT
Start: 2020-02-24 | End: 2020-02-24

## 2020-02-24 RX ORDER — POTASSIUM CHLORIDE 7.45 MG/ML
10 INJECTION INTRAVENOUS
Status: DISCONTINUED | OUTPATIENT
Start: 2020-02-24 | End: 2020-02-24 | Stop reason: CLARIF

## 2020-02-24 RX ORDER — SERTRALINE HYDROCHLORIDE 25 MG/1
25 TABLET, FILM COATED ORAL EVERY EVENING
Status: COMPLETED | OUTPATIENT
Start: 2020-02-24 | End: 2020-02-24

## 2020-02-24 RX ORDER — AMOXICILLIN 250 MG
2 CAPSULE ORAL 2 TIMES DAILY PRN
Status: DISCONTINUED | OUTPATIENT
Start: 2020-02-24 | End: 2020-02-27 | Stop reason: HOSPADM

## 2020-02-24 RX ORDER — CEFTRIAXONE SODIUM 2 G/50ML
2 INJECTION, SOLUTION INTRAVENOUS EVERY 24 HOURS
Status: DISCONTINUED | OUTPATIENT
Start: 2020-02-25 | End: 2020-02-27 | Stop reason: HOSPADM

## 2020-02-24 RX ORDER — OXYCODONE HYDROCHLORIDE 5 MG/1
5-10 TABLET ORAL
Status: DISCONTINUED | OUTPATIENT
Start: 2020-02-24 | End: 2020-02-27 | Stop reason: HOSPADM

## 2020-02-24 RX ORDER — ACETAMINOPHEN 325 MG/1
650 TABLET ORAL EVERY 4 HOURS PRN
Status: DISCONTINUED | OUTPATIENT
Start: 2020-02-24 | End: 2020-02-27 | Stop reason: HOSPADM

## 2020-02-24 RX ORDER — PROCHLORPERAZINE 25 MG
25 SUPPOSITORY, RECTAL RECTAL EVERY 12 HOURS PRN
Status: DISCONTINUED | OUTPATIENT
Start: 2020-02-24 | End: 2020-02-27 | Stop reason: HOSPADM

## 2020-02-24 RX ORDER — ONDANSETRON 2 MG/ML
4 INJECTION INTRAMUSCULAR; INTRAVENOUS EVERY 6 HOURS PRN
Status: DISCONTINUED | OUTPATIENT
Start: 2020-02-24 | End: 2020-02-24

## 2020-02-24 RX ORDER — POLYETHYLENE GLYCOL 3350 17 G/17G
17 POWDER, FOR SOLUTION ORAL DAILY PRN
Status: DISCONTINUED | OUTPATIENT
Start: 2020-02-24 | End: 2020-02-27 | Stop reason: HOSPADM

## 2020-02-24 RX ORDER — PROCHLORPERAZINE MALEATE 5 MG
10 TABLET ORAL EVERY 6 HOURS PRN
Status: DISCONTINUED | OUTPATIENT
Start: 2020-02-24 | End: 2020-02-24

## 2020-02-24 RX ORDER — POTASSIUM CHLORIDE 1500 MG/1
20-40 TABLET, EXTENDED RELEASE ORAL
Status: DISCONTINUED | OUTPATIENT
Start: 2020-02-24 | End: 2020-02-24 | Stop reason: CLARIF

## 2020-02-24 RX ORDER — ONDANSETRON 2 MG/ML
4 INJECTION INTRAMUSCULAR; INTRAVENOUS EVERY 6 HOURS PRN
Status: DISCONTINUED | OUTPATIENT
Start: 2020-02-24 | End: 2020-02-27 | Stop reason: HOSPADM

## 2020-02-24 RX ORDER — ONDANSETRON 4 MG/1
4 TABLET, ORALLY DISINTEGRATING ORAL EVERY 6 HOURS PRN
Status: DISCONTINUED | OUTPATIENT
Start: 2020-02-24 | End: 2020-02-27 | Stop reason: HOSPADM

## 2020-02-24 RX ADMIN — SERTRALINE HYDROCHLORIDE 25 MG: 25 TABLET ORAL at 18:50

## 2020-02-24 RX ADMIN — SODIUM CHLORIDE 1000 ML: 9 INJECTION, SOLUTION INTRAVENOUS at 13:28

## 2020-02-24 RX ADMIN — ENOXAPARIN SODIUM 40 MG: 40 INJECTION SUBCUTANEOUS at 18:50

## 2020-02-24 RX ADMIN — SODIUM CHLORIDE: 9 INJECTION, SOLUTION INTRAVENOUS at 20:30

## 2020-02-24 RX ADMIN — CEFTRIAXONE SODIUM 1 G: 1 INJECTION, SOLUTION INTRAVENOUS at 13:28

## 2020-02-24 RX ADMIN — SODIUM CHLORIDE 2000 ML: 9 INJECTION, SOLUTION INTRAVENOUS at 14:58

## 2020-02-24 RX ADMIN — IBUPROFEN 600 MG: 200 TABLET, FILM COATED ORAL at 13:30

## 2020-02-24 RX ADMIN — ACETAMINOPHEN 650 MG: 325 TABLET, FILM COATED ORAL at 18:50

## 2020-02-24 RX ADMIN — SODIUM CHLORIDE: 9 INJECTION, SOLUTION INTRAVENOUS at 17:17

## 2020-02-24 ASSESSMENT — ACTIVITIES OF DAILY LIVING (ADL)
RETIRED_COMMUNICATION: 0-->UNDERSTANDS/COMMUNICATES WITHOUT DIFFICULTY
RETIRED_EATING: 0-->INDEPENDENT
BATHING: 1-->ASSISTIVE EQUIPMENT
ADLS_ACUITY_SCORE: 16
AMBULATION: 1-->ASSISTIVE EQUIPMENT
SWALLOWING: 0-->SWALLOWS FOODS/LIQUIDS WITHOUT DIFFICULTY
TOILETING: 0-->INDEPENDENT
TRANSFERRING: 1-->ASSISTIVE EQUIPMENT
COGNITION: 0 - NO COGNITION ISSUES REPORTED
FALL_HISTORY_WITHIN_LAST_SIX_MONTHS: NO
DRESS: 0-->INDEPENDENT

## 2020-02-24 ASSESSMENT — MIFFLIN-ST. JEOR
SCORE: 2379.13
SCORE: 2553.25

## 2020-02-24 NOTE — ED PROVIDER NOTES
History     Chief Complaint   Patient presents with     Fever     started at 0800-102 ear temp -chilled and shakey-rapid resp-finger tips and nailbeds blue hx MS generalized weakness     HPI  Bess Enamorado is a 44 year old female who presents for fever and chills with diffuse body aches.  The patient says that she started to feel ill this morning with fevers up to 102  F.  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  No cough or shortness of breath.  She denies headache.  She is complaining of some milder pain over the left proximal thigh internally.  She says this area has been rubbing awkwardly at times.  She denies any dysuria or urinary frequency but she has been incontinent of urine.  She says she did not think that her lower extremity swelling or redness is worse than her baseline.    Allergies:  Allergies   Allergen Reactions     Nkda [No Known Drug Allergies]        Problem List:    Patient Active Problem List    Diagnosis Date Noted     History of abnormal cervical Pap smear 08/26/2019     Priority: Medium     Sepsis (H) 10/23/2018     Priority: Medium     Cellulitis of abdominal wall 10/22/2018     Priority: Medium     Moderate episode of recurrent major depressive disorder (H) 03/23/2018     Priority: Medium     NARCISA (obstructive sleep apnea) 08/22/2017     Priority: Medium     Severe NARCISA with sleep-associated hypoxemia, with likely REM-related hypoventilation  Polysomnography - Test date 8/9/2017  AHI 45.9, basline SpO2 92.9%, mike SpO2 54.9%, time of SpO2 <= 88% of 31.7 minutes.  Severe desaturations and sustained hypoxemia confined to singular supine REM period (no lateral REM observed for comparison).  Also seen to have TCM increase by ~15 mmHg over baseline in supine REM, consistent with hypoventilation.  Pre-study VBG was WNL.  CPAP titrated to 10 cm H2O and appeared optimal,  including supine REM, with normalization of SpO2 and TCM normalized to low-40's.  Seen to have frequent PLM's (64.3 / hour on diagnostic, 89.6 / hour on treatment, ~20% associated with cortical arousals).         Benign essential hypertension 02/15/2016     Priority: Medium     Peroneal tendon rupture 11/23/2015     Priority: Medium     Morbid obesity (H) 08/24/2015     Priority: Medium     Oct 2016:  Starting [employer] program in [place], goal to lose 140 lbs over next 2 years       Papanicolaou smear of cervix with low grade squamous intraepithelial lesion (LGSIL) 08/03/2014     Priority: Medium     7/29/2014:Pap--LSIL. Neg high risk HPV. Plan cotest in 1 year (if this is ASCUS or LSIL then colp). In reminders  8/20/15: Pap - ASCUS, Neg HPV. Plan colp  9/14/15: [place] Bx & ECC - negative. Plan cotest in 1 year.   10/20/16: NIL Pap, Neg HPV. Plan cotest in 3 years.        Eating disorder 08/25/2013     Priority: Medium     Binge-eating disorder; social phobia  See psychological assessment from [facility], Dr. Anupama Bowie, 8/14/2013  Problem list name updated by automated process. Provider to review       Leg edema 04/19/2013     Priority: Medium     Dyspnea and respiratory abnormality 04/19/2013     Priority: Medium     Problem list name updated by automated process. Provider to review       Anxiety 06/28/2011     Priority: Medium     Followed by neurologist       Restless legs 06/28/2011     Priority: Medium     CARDIOVASCULAR SCREENING; LDL GOAL LESS THAN 160 10/31/2010     Priority: Medium     Constipation 09/18/2006     Priority: Medium     9/18/2006    Has tried otc suppository and otc lax.   Problem list name updated by automated process. Provider to review       Multiple sclerosis (H) 08/29/2005     Priority: Medium     9/18/200pt dx with MS 2004--dx by neurolgist and is followed by Dr. Jeet Nicholson, also MS nurse Heather Thomas       Polycystic ovaries 08/29/2005     Priority: Medium      Diagnosed in [place], had facial hair, overweight, carb craving, records being requested, never on metformin          Past Medical History:    Past Medical History:   Diagnosis Date     ASCUS favor benign 8/2015     H/O colposcopy with cervical biopsy 9/14/15     LSIL on Pap smear 7/2014     Multiple sclerosis (H) 8/29/2005     Shingles 10/2016     UNSPEC CONSTIPATION 9/18/2006       Past Surgical History:    Past Surgical History:   Procedure Laterality Date     CHOLECYSTECTOMY, LAPOROSCOPIC      Cholecystectomy, Laparoscopic     OPEN REDUCTION INTERNAL FIXATION TOE(S)  4/13/2012    Procedure:OPEN REDUCTION INTERNAL FIXATION TOE(S); Open reduction internal fixation right proximal fifth metatarsal fracture-   Anes-choice block; Surgeon:LEY, JEFFREY DUANE; Location:[facility] OR       Family History:    Family History   Problem Relation Age of Onset     Neurologic Disorder Father         MS     Heart Disease Father         irregular heart rate     Diabetes Father      Melanoma Father      Sleep Apnea Father      Cancer Maternal Grandfather         lung     Cancer Paternal Grandmother         stomach     Cancer Mother      Gynecology Maternal Aunt         PCOS       Social History:  Marital Status:  Single [1]  Social History     Tobacco Use     Smoking status: Never Smoker     Smokeless tobacco: Never Used   Substance Use Topics     Alcohol use: Yes     Comment: social     Drug use: No        Medications:    acetaminophen (TYLENOL) 500 MG tablet  desogestrel-ethinyl estradiol (APRI) 0.15-30 MG-MCG tablet  lisinopril (PRINIVIL/ZESTRIL) 20 MG tablet  metoprolol succinate ER (TOPROL-XL) 25 MG 24 hr tablet  Ocrelizumab (OCREVUS IV)  sertraline (ZOLOFT) 50 MG tablet  Cholecalciferol (VITAMIN D3 PO)  order for DME          Review of Systems  Pertinent positives and negatives listed in the HPI, all other systems reviewed and are negative.    Physical Exam   BP: (!) 152/77  Pulse: 127  Heart Rate: 106  Temp: 99.8  F (37.7  C)  Resp:  "(!) 32  Height: 172.7 cm (5' 8\")  Weight: (!) 186 kg (410 lb)  SpO2: 94 %      Physical Exam  Constitutional: Awake, oriented to person, place, and time; pleasant  HEENT: Atraumatic; External ears normal; external nose normal; pupils 3 mm bilaterally and reactive to light  NECK: Supple  Cardiovascular: Sinus tachycardia, warm extremities  Respiratory: No respiratory distress, bilateral chest rise  Abdomen: Soft, nontender  Chest and Back: No deformities or abrasions  Genitourinary: Normal external genitalia  Extremeties: Moving all extremities, no deformities  Neurologic: Awake, alert and oriented x3, cranial nerves grossly intact; moving all extremities  Skin: Warm and dry; bilateral lower extremity edema with erythema consistent with bilateral chronic venous stasis with increased redness over the anterior portion of the left upper thigh with tenderness to elevation consistent with cellulitis    ED Course        Procedures    Results for orders placed during the hospital encounter of 02/24/20   POC US SOFT TISSUE    Impression [facility] Procedure Note     Limited Bedside ED Ultrasound of Soft Tissue:    PROCEDURE: PERFORMED BY: Dr. Rehan Fuller MD  INDICATIONS/SYMPTOM: Skin redness, evaluate for abscess, cellulitis or foreign body  PROBE: High frequency linear probe  BODY LOCATION: Soft tissue located on extremity     FINDINGS: Cobblestoning of soft tissue: present   Hypoechoic fluid (ie abscess) identified: absent      INTERPRETATION:  The soft tissue and muscle layers were evaluated.      Findings indicate cellulitis    IMAGE DOCUMENTATION: Images were archived to PACs system.            The patient has signs of Severe Sepsis REMINDER: Please use septic shock SmartPhrase for Lactate > 4 or a patient requiring vasopressors after initial fluid bolus (meaning persistent hypotension)      If one the following conditions is present, a 30 mL/kg bolus is recommended as part of the 6 hour bundle (IBW can " be used for BMI >30, or document refusal/contraindication):      1.   Initial hypotension  defined as 2 bps < 90 or map < 65 in the 6hrs before or 6hrs after time zero.     2.  Lactate >4.     The patient has signs of Severe Sepsis as evidenced by:    1. 2 SIRS criteria, AND  2. Suspected infection, AND   3. Organ dysfunction: Lactic Acid > 2.0    Time severe sepsis diagnosis confirmed: 1249  02/24/20 as this was the time when Lactate resulted, and the level was > 2.0    3 Hour Severe Sepsis Bundle Completion:    1. Initial Lactic Acid Result:   Recent Labs   Lab Test 10/24/18  0503 10/23/18  0225 10/22/18  1921   LACT 1.0 0.8 2.3*     2. Blood Cultures before Antibiotics: Yes  3. Broad Spectrum Antibiotics Administered:  yes       Anti-infectives (From admission through now)    Start     Dose/Rate Route Frequency Ordered Stop    02/24/20 1255  cefTRIAXone in d5w (ROCEPHIN) intermittent infusion 1 g      1 g  over 30 Minutes Intravenous EVERY 24 HOURS 02/24/20 1255            4. Fluid volume administered in ED:  Obese patient (BMI >30); 30 mL/kg bolus based on IBW given (see amount below).    BMI Readings from Last 1 Encounters:   02/24/20 62.34 kg/m      30 mL/kg fluids based on weight: 5,580 mL  30 mL/kg fluids based on IBW (must be >= 60 inches tall): 1,920 mL                Severe Sepsis reassessment:  1. Repeat Lactic Acid Level: pending  2. MAP>65 after initial IVF bolus, will continue to monitor fluid status and vital signs    I attest to having performed a repeat sepsis exam and assessment of perfusion at 1405 and the results demonstrate no change.         Results for orders placed or performed during the hospital encounter of 02/24/20 (from the past 24 hour(s))   Comprehensive metabolic panel   Result Value Ref Range    Sodium 136 133 - 144 mmol/L    Potassium 3.8 3.4 - 5.3 mmol/L    Chloride 103 94 - 109 mmol/L    Carbon Dioxide 25 20 - 32 mmol/L    Anion Gap 8 3 - 14 mmol/L    Glucose 124 (H) 70 - 99  mg/dL    Urea Nitrogen 14 7 - 30 mg/dL    Creatinine 0.65 0.52 - 1.04 mg/dL    GFR Estimate >90 >60 mL/min/[1.73_m2]    GFR Estimate If Black >90 >60 mL/min/[1.73_m2]    Calcium 10.1 8.5 - 10.1 mg/dL    Bilirubin Total 1.0 0.2 - 1.3 mg/dL    Albumin 3.5 3.4 - 5.0 g/dL    Protein Total 8.1 6.8 - 8.8 g/dL    Alkaline Phosphatase 58 40 - 150 U/L    ALT 22 0 - 50 U/L    AST 18 0 - 45 U/L   CBC with platelets differential   Result Value Ref Range    WBC 20.9 (H) 4.0 - 11.0 10e9/L    RBC Count 5.45 (H) 3.8 - 5.2 10e12/L    Hemoglobin 14.6 11.7 - 15.7 g/dL    Hematocrit 45.7 35.0 - 47.0 %    MCV 84 78 - 100 fl    MCH 26.8 26.5 - 33.0 pg    MCHC 31.9 31.5 - 36.5 g/dL    RDW 15.4 (H) 10.0 - 15.0 %    Platelet Count 329 150 - 450 10e9/L    Diff Method Automated Method     % Neutrophils 94.1 %    % Lymphocytes 1.3 %    % Monocytes 3.9 %    % Eosinophils 0.0 %    % Basophils 0.3 %    % Immature Granulocytes 0.4 %    Nucleated RBCs 0 0 /100    Absolute Neutrophil 19.6 (H) 1.6 - 8.3 10e9/L    Absolute Lymphocytes 0.3 (L) 0.8 - 5.3 10e9/L    Absolute Monocytes 0.8 0.0 - 1.3 10e9/L    Absolute Eosinophils 0.0 0.0 - 0.7 10e9/L    Absolute Basophils 0.1 0.0 - 0.2 10e9/L    Abs Immature Granulocytes 0.1 0 - 0.4 10e9/L    Absolute Nucleated RBC 0.0    Lactate for Sepsis Protocol   Result Value Ref Range    Lactate for Sepsis Protocol 4.4 (HH) 0.7 - 2.0 mmol/L   UA reflex to Microscopic   Result Value Ref Range    Color Urine Yellow     Appearance Urine Clear     Glucose Urine Negative NEG^Negative mg/dL    Bilirubin Urine Negative NEG^Negative    Ketones Urine Negative NEG^Negative mg/dL    Specific Gravity Urine 1.014 1.003 - 1.035    Blood Urine Small (A) NEG^Negative    pH Urine 5.0 5.0 - 7.0 pH    Protein Albumin Urine Negative NEG^Negative mg/dL    Urobilinogen mg/dL 0.0 0.0 - 2.0 mg/dL    Nitrite Urine Negative NEG^Negative    Leukocyte Esterase Urine Negative NEG^Negative    Source Midstream Urine     RBC Urine 1 0 - 2 /HPF     WBC Urine 4 0 - 5 /HPF    Squamous Epithelial /HPF Urine <1 0 - 1 /HPF    Mucous Urine Present (A) NEG^Negative /LPF   POC US SOFT TISSUE    Impression    [facility] Procedure Note     Limited Bedside ED Ultrasound of Soft Tissue:    PROCEDURE: PERFORMED BY: Dr. Rehan Fuller MD  INDICATIONS/SYMPTOM: Skin redness, evaluate for abscess, cellulitis or foreign body  PROBE: High frequency linear probe  BODY LOCATION: Soft tissue located on extremity     FINDINGS: Cobblestoning of soft tissue: present   Hypoechoic fluid (ie abscess) identified: absent      INTERPRETATION:  The soft tissue and muscle layers were evaluated.      Findings indicate cellulitis    IMAGE DOCUMENTATION: Images were archived to PACs system.    Influenza A/B antigen   Result Value Ref Range    Influenza A/B Agn Specimen Nasal     Influenza A Negative NEG^Negative    Influenza B Negative NEG^Negative       Medications   sodium chloride (PF) 0.9% PF flush 3 mL (has no administration in time range)   sodium chloride (PF) 0.9% PF flush 3 mL (has no administration in time range)   0.9% sodium chloride BOLUS (has no administration in time range)   sodium chloride (PF) 0.9% PF flush 3 mL (has no administration in time range)   sodium chloride (PF) 0.9% PF flush 3 mL (has no administration in time range)   cefTRIAXone in d5w (ROCEPHIN) intermittent infusion 1 g (1 g Intravenous New Bag 2/24/20 1328)   0.9% sodium chloride BOLUS (1,000 mLs Intravenous New Bag 2/24/20 1328)   ibuprofen (ADVIL/MOTRIN) tablet 600 mg (600 mg Oral Given 2/24/20 1330)       Assessments & Plan (with Medical Decision Making)   44-year-old female who presents for fever.  Temperatures 99.8  F, heart rate 130, SPO2 is 94% on room air.  To the IV fluids and ibuprofen.  Lactic acid is elevated at 4.4.  White blood cell count is 20.9.  Hemoglobin is normal at 14.6.  Electrolytes are within normal limits.  Blood cultures are drawn and pending.  She is given IV ceftriaxone  for sepsis.  No symptoms to suggest pneumonia.  Urinalysis is negative for signs of infection.  I think this likely represents cellulitis.  Bedside ultrasound is negative for signs of an abscess of the left upper thigh.  She is given 2 L of IV fluids for treatment of sepsis.  She will be admitted to the medicine service for further treatment.  I discussed the case with the on-call hospitalist, Dr. Johnson who agrees to admit the patient to her service.    I have reviewed the nursing notes.    I have reviewed the findings, diagnosis, plan and need for follow up with the patient.     Critical Care Addendum    My initial assessment, based on my review of prehospital provider report, review of nursing observations, review of vital signs, focused history and physical exam, established that Bess Enamorado has suspicion for sepsis and need for evaluation and early goal-directed therapy, which requires immediate intervention, and therefore she is critically ill.     After the initial assessment, the care team initiated multiple lab tests, initiated IV fluid administration and initiated medication therapy with ceftriaxone to provide stabilization care. Due to the critical nature of this patient, I reassessed nursing observations, vital signs, physical exam, mental status and respiratory status multiple times prior to her disposition.     Time also spent performing documentation, reviewing test results and coordination of care.     Critical care time (excluding teaching time and procedures): 30 minutes.     New Prescriptions    No medications on file       Final diagnoses:   Severe sepsis (H)   Cellulitis of left lower extremity       2/24/2020   [facility] EMERGENCY DEPARTMENT     Rehan Fuller MD  02/24/20 [id number]

## 2020-02-24 NOTE — ED NOTES
Left leg weeping for 4 weeks, increased urination/incontinence, and chills/shaking since this morning.

## 2020-02-24 NOTE — PROGRESS NOTES
"Page sent to Dr. Urias with the following:    \"Do you still want lactic recheck at 1800? It was normal at 1.1 at 1618. Thanks, Tierra\"  "

## 2020-02-24 NOTE — LETTER
Transition Communication Hand-off for Care Transitions to Next Level of Care Provider    Name: Bess Enamorado  : 1975  MRN #: [id number]  Primary Care Provider: Mikala Luna  Primary Care MD Name: edelmira  Primary Clinic: [id number] [facility] [address]  Primary Care Clinic Name: [facility]    Reason for Hospitalization:  Cellulitis of left lower extremity [L03.116]  Severe sepsis (H) [A41.9, R65.20]  Admit Date/Time: 2020 12:09 PM  Discharge Date: 20  Payor Source: Payor: [employer] / Plan: [employer] OPEN ACCESS / Product Type: HMO /     Readmission Assessment Measure (CHRISTIE) Risk Score/category: 46         Reason for Communication Hand-off Referral: Fragility.       Discharge Plan:  Home with  Home Care RN, PT, HHA & Lymphedema.  Pt is a  & due to medical complexity, she is no longer able to work.  CTS SW discussed SSDI application process, but pt requested clinic SW to follow up.       Concern for non-adherence with plan of care:   Y/N NO  Discharge Needs Assessment:  Needs      Most Recent Value   Equipment Currently Used at Home  wheelchair, manual, walker, rolling, shower chair   Home Care  [facility] [phone], Fax: [phone]        Follow-up plan:    Future Appointments   Date Time Provider Department Center   2020  4:00 PM Mikala Luna MD [facility]     Key Recommendations:  Patient feeling very overwhelmed due to medical complexity and is requesting assistance with resource navigation once she returns home.      KAY Lal    AVS/Discharge Summary is the source of truth; this is a helpful guide for improved communication of patient story.

## 2020-02-24 NOTE — PLAN OF CARE
"[facility] ADMISSION NOTE    Patient admitted to room 2312 at approximately 1630 via cart from emergency room. Patient was accompanied by transport tech.     Verbal SBAR report received from Peggy ARGUELLES prior to patient arrival.     Patient trasferred to bed via air hussain. Patient alert and oriented X 3. The patient is not having any pain. 0-10 Pain Scale: 0. Admission vital signs: Blood pressure 129/49, pulse 136, temperature 101.9  F (38.8  C), temperature source Oral, resp. rate 24, height 1.6 m (5' 3\"), weight (!) 176.5 kg (389 lb 1.8 oz), last menstrual period 01/27/2020, SpO2 92 %, not currently breastfeeding. Patient was oriented to plan of care, call light, bed controls, tv, telephone, bathroom, and visiting hours.     Risk Assessment    The following safety risks were identified during admission: fall. Yellow risk band applied: YES.     Skin Initial Assessment    This writer admitted this patient and completed a full skin assessment and Ben score in the Adult PCS flowsheet. Appropriate interventions initiated as needed.     Secondary skin check completed by Mary Jo Cai RN.    Ben Risk Assessment  Sensory Perception: 4-->no impairment  Moisture: 2-->very moist  Activity: 2-->chairfast  Mobility: 2-->very limited(at this time with BLE pain)  Nutrition: 3-->adequate  Friction and Shear: 2-->potential problem  Ben Score: 15    Education    Patient has a [place] to Observation order: No  Observation education completed and documented: N/A      Maeve Goode RN      "

## 2020-02-24 NOTE — H&P
[facility]    History and Physical - Hospitalist Service       Date of Admission:  2/24/2020    Assessment & Plan   Bess Enamorado is a 44 year old female admitted on 2/24/2020. She presents with left posterior thigh pain.    Sepsis due to left leg cellulitis  Initial lactate was 4.4.  Patient was given 2 L normal saline in the emergency department as well as started on ceftriaxone.  Point-of-care ultrasound was negative for abscess in the upper thigh.  She was given ibuprofen for pain.    -Continue Rocephin.  -Recheck lactate at 1800  -Continue volume resuscitation with normal saline  -Pain control with acetaminophen, ibuprofen, tizanidine, and oxycodone    Multiple sclerosis  The patient receives ocrelizumab every 6 months.  We will consult physical and occupational therapy.    NARCISA  The patient is intolerant of CPAP.    Depression and anxiety  Order Zoloft 25 mg tonight, titrate to 50 mg starting tomorrow night.    Hypertension  Hold lisinopril and metoprolol due to severe sepsis.    Morbid obesity  Lymphedema  Patient should follow-up with lymphedema clinic.    Polycystic ovarian syndrome  Continue prior to admission birth control pills.     Diet: Regular  DVT Prophylaxis: Enoxaparin (Lovenox) SQ  Brar Catheter: not present  Code Status: Full    Disposition Plan   Expected discharge: 2 - 3 days, recommended to prior living arrangement once SIRS/Sepsis treated.  Entered: Agapito Urias MD 02/24/2020, 3:25 PM       Agapito Urias MD  [facility]    ______________________________________________________________________    Chief Complaint   Chief Complaint   Patient presents with     Fever     started at 0800-102 ear temp -chilled and shakey-rapid resp-finger tips and nailbeds blue hx MS generalized weakness        History is obtained from the patient    History of Present Illness   Bess Enamorado is a 44 year old female who presents with left leg pain.   Patient states that she has chronic lower extremity edema.  In the past she has used LILLIE stockings, but has unable to put them on for some time.  Patient has chronic weeping from her left posterior thigh.  She had noted that the weeping had stopped on Saturday.  She was otherwise feeling well until this morning.  She denies any falls or trauma.  She denies any nausea, vomiting, or diarrhea.  She not had any headaches, nasal congestion, runny nose, sore throat, or cough.  She denies any dysuria or hematuria.  She has not noted any changes in her lower extremity swelling prior to today.  She is not aware of any open areas on her skin.  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  Around 830 she started noticing increasing pain behind her left leg.  She then developed fevers and chills.  She presented to the emergency room today and was noted to have increased lower extremity redness and white count of 20.9.   Patient noted increased twitching in her left leg last night.      Review of Systems    The 10 point Review of Systems is negative other than noted in the HPI or here.    Past Medical History    I have reviewed this patient's medical history and updated it with pertinent information if needed.   Past Medical History:   Diagnosis Date     ASCUS favor benign 8/2015    Neg HPV     H/O colposcopy with cervical biopsy 9/14/15    Bx & ECC - negative     LSIL on Pap smear 7/2014    Neg high risk HPV     Multiple sclerosis (H) 8/29/2005 9/18/200pt dx with MS 2004--dx by neurolgist and is followed by Dr. Steven Ayala 10/2016     UNSPEC CONSTIPATION 9/18/2006 9/18/2006   Has tried otc suppository and otc lax.        Patient Active Problem List    Diagnosis Date Noted     History of abnormal cervical Pap smear 08/26/2019     Priority: Medium     Sepsis (H) 10/23/2018     Priority: Medium     Cellulitis of abdominal wall 10/22/2018     Priority: Medium     Moderate episode of recurrent  major depressive disorder (H) 03/23/2018     Priority: Medium     NARCISA (obstructive sleep apnea) 08/22/2017     Priority: Medium     Severe NARCISA with sleep-associated hypoxemia, with likely REM-related hypoventilation  Polysomnography - Test date 8/9/2017  AHI 45.9, basline SpO2 92.9%, mike SpO2 54.9%, time of SpO2 <= 88% of 31.7 minutes.  Severe desaturations and sustained hypoxemia confined to singular supine REM period (no lateral REM observed for comparison).  Also seen to have TCM increase by ~15 mmHg over baseline in supine REM, consistent with hypoventilation.  Pre-study VBG was WNL.  CPAP titrated to 10 cm H2O and appeared optimal, including supine REM, with normalization of SpO2 and TCM normalized to low-40's.  Seen to have frequent PLM's (64.3 / hour on diagnostic, 89.6 / hour on treatment, ~20% associated with cortical arousals).         Benign essential hypertension 02/15/2016     Priority: Medium     Peroneal tendon rupture 11/23/2015     Priority: Medium     Morbid obesity (H) 08/24/2015     Priority: Medium     Oct 2016:  Starting [employer] program in [place], goal to lose 140 lbs over next 2 years       Papanicolaou smear of cervix with low grade squamous intraepithelial lesion (LGSIL) 08/03/2014     Priority: Medium     7/29/2014:Pap--LSIL. Neg high risk HPV. Plan cotest in 1 year (if this is ASCUS or LSIL then colp). In reminders  8/20/15: Pap - ASCUS, Neg HPV. Plan colp  9/14/15: [place] Bx & ECC - negative. Plan cotest in 1 year.   10/20/16: NIL Pap, Neg HPV. Plan cotest in 3 years.        Eating disorder 08/25/2013     Priority: Medium     Binge-eating disorder; social phobia  See psychological assessment from [facility], Dr. Anupama Bowie, 8/14/2013  Problem list name updated by automated process. Provider to review       Leg edema 04/19/2013     Priority: Medium     Dyspnea and respiratory abnormality 04/19/2013     Priority: Medium     Problem list name updated by automated process.  Provider to review       Anxiety 06/28/2011     Priority: Medium     Followed by neurologist       Restless legs 06/28/2011     Priority: Medium     CARDIOVASCULAR SCREENING; LDL GOAL LESS THAN 160 10/31/2010     Priority: Medium     Constipation 09/18/2006     Priority: Medium     9/18/2006    Has tried otc suppository and otc lax.   Problem list name updated by automated process. Provider to review       Multiple sclerosis (H) 08/29/2005     Priority: Medium     9/18/200pt dx with MS 2004--dx by neurolgist and is followed by Dr. Jeet Nicholson, also MS nurse Heather Thomas       Polycystic ovaries 08/29/2005     Priority: Medium     Diagnosed in [place], had facial hair, overweight, carb craving, records being requested, never on metformin          Past Surgical History   I have reviewed this patient's surgical history and updated it with pertinent information if needed.  Past Surgical History:   Procedure Laterality Date     CHOLECYSTECTOMY, LAPOROSCOPIC      Cholecystectomy, Laparoscopic     OPEN REDUCTION INTERNAL FIXATION TOE(S)  4/13/2012    Procedure:OPEN REDUCTION INTERNAL FIXATION TOE(S); Open reduction internal fixation right proximal fifth metatarsal fracture-   Anes-choice block; Surgeon:LEY, JEFFREY DUANE; Location:[facility] OR       Social History   I have reviewed this patient's social history and updated it with pertinent information if needed.  Social History     Tobacco Use     Smoking status: Never Smoker     Smokeless tobacco: Never Used   Substance Use Topics     Alcohol use: Yes     Comment: social     Drug use: No       Family History   I have reviewed this patient's family history and updated it with pertinent information if needed.   Family History   Problem Relation Age of Onset     Neurologic Disorder Father         MS     Heart Disease Father         irregular heart rate     Diabetes Father      Melanoma Father      Sleep Apnea Father      Cancer Maternal Grandfather         lung     Cancer  Paternal Grandmother         stomach     Cancer Mother      Gynecology Maternal Aunt         PCOS       Prior to Admission Medications   Prior to Admission Medications   Prescriptions Last Dose Informant Patient Reported? Taking?   Cholecalciferol (VITAMIN D3 PO) More than a month at Unknown time Self Yes No   Sig: Take 10,000 Units by mouth daily    Ocrelizumab (OCREVUS IV) Past Month at Unknown time Self Yes Yes   Sig: Inject into the vein every 6 months In clinic   acetaminophen (TYLENOL) 500 MG tablet 2/24/2020 at 0000 Self Yes Yes   Sig: Take 500-1,000 mg by mouth once as needed for mild pain   desogestrel-ethinyl estradiol (APRI) 0.15-30 MG-MCG tablet 2/23/2020 at pm Self No Yes   Sig: TAKE 1 TABLET DAILY CONTINUOUSLY-OMIT PLACEBPO TABS UNTIL AFTER 3 MONTHS OF HORMONE TABS   lisinopril (PRINIVIL/ZESTRIL) 20 MG tablet 2/23/2020 at pm Self No Yes   Sig: Take 1 tablet (20 mg) by mouth daily   metoprolol succinate ER (TOPROL-XL) 25 MG 24 hr tablet 2/23/2020 at pm Self No Yes   Sig: Take 1 tablet (25 mg) by mouth daily   order for DME More than a month at Unknown time Self Yes No   Sig: Equipment ordered: RESMED CPAP Mask type: Nasal Settings: 10 CM H2O   sertraline (ZOLOFT) 50 MG tablet 2/23/2020 at pm Self No Yes   Sig: Take 1 tablet (50 mg) by mouth daily   Patient taking differently: Take 25 mg by mouth every evening Will start taking whole tab 2/25/20 pm      Facility-Administered Medications: None     Allergies   Allergies   Allergen Reactions     Nkda [No Known Drug Allergies]        Physical Exam   Vital Signs: Temp: 100.9  F (38.3  C) Temp src: Oral BP: 127/54 Pulse: 138 Heart Rate: 134 Resp: (!) 40 SpO2: 93 %      Weight: 410 lbs 0 oz    Gen: Morbidly obese female, resting in bed, appears uncomfortable  Head: atraumatic normocephalic; sclera non-injected, anicterric; oral mucosa moist, no lesions, no exudates, normal dentition  Neck: supple without spinal abnormality  Chest: clear to auscultation  bilaterally, no wheezes, no rhonchi, no rales.  Cardiovascular: regular rate and rhythm, no gallops or rubs, no murmurs, no edema  Abdomen: bowel sounds normal, no hepatosplenomegaly, no masses, non-tender, non-distended, no guarding, no rebound tenderness  Musculoskeletal: normal muscle mass, normal muscle tone  Skin: Tenderness and blanching erythema left ankle to left thigh, with BLE chronic venous stasis and woody edema  Lymph: no lymphadenopathy.  Neuro: cranial nerves II-XII intact, strength in all four extremities normal, reflexes normal, coordination normal.    Data   Data reviewed today: I reviewed all medications, new labs and imaging results over the last 24 hours. I personally reviewed no images or EKG's today.    Recent Labs   Lab 02/24/20  1233   WBC 20.9*   HGB 14.6   MCV 84         POTASSIUM 3.8   CHLORIDE 103   CO2 25   BUN 14   CR 0.65   ANIONGAP 8   EVITA 10.1   *   ALBUMIN 3.5   PROTTOTAL 8.1   BILITOTAL 1.0   ALKPHOS 58   ALT 22   AST 18         Recent Results (from the past 24 hour(s))   POC US SOFT TISSUE    Impression    [facility] Procedure Note     Limited Bedside ED Ultrasound of Soft Tissue:    PROCEDURE: PERFORMED BY: Dr. Rehan Fuller MD  INDICATIONS/SYMPTOM: Skin redness, evaluate for abscess, cellulitis or foreign body  PROBE: High frequency linear probe  BODY LOCATION: Soft tissue located on extremity     FINDINGS: Cobblestoning of soft tissue: present   Hypoechoic fluid (ie abscess) identified: absent      INTERPRETATION:  The soft tissue and muscle layers were evaluated.      Findings indicate cellulitis    IMAGE DOCUMENTATION: Images were archived to PACs system.

## 2020-02-25 ENCOUNTER — APPOINTMENT (OUTPATIENT)
Dept: OCCUPATIONAL THERAPY | Facility: CLINIC | Age: 45
DRG: 872 | End: 2020-02-25
Payer: COMMERCIAL

## 2020-02-25 ENCOUNTER — APPOINTMENT (OUTPATIENT)
Dept: PHYSICAL THERAPY | Facility: CLINIC | Age: 45
DRG: 872 | End: 2020-02-25
Payer: COMMERCIAL

## 2020-02-25 ENCOUNTER — MYC MEDICAL ADVICE (OUTPATIENT)
Dept: FAMILY MEDICINE | Facility: CLINIC | Age: 45
End: 2020-02-25

## 2020-02-25 LAB
ANION GAP SERPL CALCULATED.3IONS-SCNC: 5 MMOL/L (ref 3–14)
BASOPHILS # BLD AUTO: 0 10E9/L (ref 0–0.2)
BASOPHILS NFR BLD AUTO: 0.3 %
BUN SERPL-MCNC: 13 MG/DL (ref 7–30)
CALCIUM SERPL-MCNC: 8.5 MG/DL (ref 8.5–10.1)
CHLORIDE SERPL-SCNC: 108 MMOL/L (ref 94–109)
CO2 SERPL-SCNC: 25 MMOL/L (ref 20–32)
CREAT SERPL-MCNC: 0.62 MG/DL (ref 0.52–1.04)
DIFFERENTIAL METHOD BLD: ABNORMAL
EOSINOPHIL # BLD AUTO: 0.1 10E9/L (ref 0–0.7)
EOSINOPHIL NFR BLD AUTO: 0.4 %
ERYTHROCYTE [DISTWIDTH] IN BLOOD BY AUTOMATED COUNT: 15.9 % (ref 10–15)
GFR SERPL CREATININE-BSD FRML MDRD: >90 ML/MIN/{1.73_M2}
GLUCOSE SERPL-MCNC: 125 MG/DL (ref 70–99)
HCT VFR BLD AUTO: 39.1 % (ref 35–47)
HGB BLD-MCNC: 12.3 G/DL (ref 11.7–15.7)
IMM GRANULOCYTES # BLD: 0.1 10E9/L (ref 0–0.4)
IMM GRANULOCYTES NFR BLD: 0.5 %
LACTATE BLD-SCNC: 1 MMOL/L (ref 0.7–2)
LYMPHOCYTES # BLD AUTO: 0.3 10E9/L (ref 0.8–5.3)
LYMPHOCYTES NFR BLD AUTO: 2.1 %
MCH RBC QN AUTO: 26.5 PG (ref 26.5–33)
MCHC RBC AUTO-ENTMCNC: 31.5 G/DL (ref 31.5–36.5)
MCV RBC AUTO: 84 FL (ref 78–100)
MONOCYTES # BLD AUTO: 0.7 10E9/L (ref 0–1.3)
MONOCYTES NFR BLD AUTO: 5 %
NEUTROPHILS # BLD AUTO: 12.4 10E9/L (ref 1.6–8.3)
NEUTROPHILS NFR BLD AUTO: 91.7 %
NRBC # BLD AUTO: 0 10*3/UL
NRBC BLD AUTO-RTO: 0 /100
PLATELET # BLD AUTO: 213 10E9/L (ref 150–450)
POTASSIUM SERPL-SCNC: 3.9 MMOL/L (ref 3.4–5.3)
RBC # BLD AUTO: 4.64 10E12/L (ref 3.8–5.2)
SODIUM SERPL-SCNC: 138 MMOL/L (ref 133–144)
WBC # BLD AUTO: 13.5 10E9/L (ref 4–11)

## 2020-02-25 PROCEDURE — 25000132 ZZH RX MED GY IP 250 OP 250 PS 637: Performed by: INTERNAL MEDICINE

## 2020-02-25 PROCEDURE — 36415 COLL VENOUS BLD VENIPUNCTURE: CPT | Performed by: INTERNAL MEDICINE

## 2020-02-25 PROCEDURE — 83605 ASSAY OF LACTIC ACID: CPT | Performed by: INTERNAL MEDICINE

## 2020-02-25 PROCEDURE — 12000000 ZZH R&B MED SURG/OB

## 2020-02-25 PROCEDURE — 97161 PT EVAL LOW COMPLEX 20 MIN: CPT | Mod: GP | Performed by: PHYSICAL THERAPIST

## 2020-02-25 PROCEDURE — 25000128 H RX IP 250 OP 636: Performed by: INTERNAL MEDICINE

## 2020-02-25 PROCEDURE — 40000894 ZZH STATISTIC OT IP EVAL DEFER: Performed by: OCCUPATIONAL THERAPIST

## 2020-02-25 PROCEDURE — 25800030 ZZH RX IP 258 OP 636: Performed by: INTERNAL MEDICINE

## 2020-02-25 PROCEDURE — 85025 COMPLETE CBC W/AUTO DIFF WBC: CPT | Performed by: INTERNAL MEDICINE

## 2020-02-25 PROCEDURE — 99232 SBSQ HOSP IP/OBS MODERATE 35: CPT | Performed by: INTERNAL MEDICINE

## 2020-02-25 PROCEDURE — 80048 BASIC METABOLIC PNL TOTAL CA: CPT | Performed by: INTERNAL MEDICINE

## 2020-02-25 PROCEDURE — 87040 BLOOD CULTURE FOR BACTERIA: CPT | Performed by: INTERNAL MEDICINE

## 2020-02-25 RX ADMIN — SODIUM CHLORIDE: 9 INJECTION, SOLUTION INTRAVENOUS at 03:11

## 2020-02-25 RX ADMIN — ACETAMINOPHEN 650 MG: 325 TABLET, FILM COATED ORAL at 04:07

## 2020-02-25 RX ADMIN — ACETAMINOPHEN 650 MG: 325 TABLET, FILM COATED ORAL at 13:44

## 2020-02-25 RX ADMIN — SODIUM CHLORIDE: 9 INJECTION, SOLUTION INTRAVENOUS at 16:55

## 2020-02-25 RX ADMIN — SERTRALINE HYDROCHLORIDE 50 MG: 50 TABLET ORAL at 08:32

## 2020-02-25 RX ADMIN — SODIUM CHLORIDE: 9 INJECTION, SOLUTION INTRAVENOUS at 09:29

## 2020-02-25 RX ADMIN — CEFTRIAXONE SODIUM 2 G: 2 INJECTION, SOLUTION INTRAVENOUS at 13:28

## 2020-02-25 RX ADMIN — ENOXAPARIN SODIUM 40 MG: 40 INJECTION SUBCUTANEOUS at 17:54

## 2020-02-25 ASSESSMENT — ACTIVITIES OF DAILY LIVING (ADL)
ADLS_ACUITY_SCORE: 16

## 2020-02-25 ASSESSMENT — MIFFLIN-ST. JEOR: SCORE: 2365.13

## 2020-02-25 NOTE — PLAN OF CARE
Bilateral legs remain severely edematous, redness receding from previous markings, area remains dry with no drainage noted.  Oxygen saturation 92% on room air, up with 1-2 assist to bathroom, IV fluids infusing, will monitor.

## 2020-02-25 NOTE — PROGRESS NOTES
[facility]    Hospitalist Progress Note    Date of Service (when I saw the patient): 02/25/2020    Assessment & Plan   Bess Enamorado is a 44 year old female who was admitted on 2/24/2020. She presents with left posterior thigh pain.     Sepsis due to left leg cellulitis  Initial lactate was 4.4.  Patient was given 2 L normal saline in the emergency department as well as started on ceftriaxone.  Point-of-care ultrasound was negative for abscess in the upper thigh.  She was given ibuprofen for pain.    -Continue Rocephin.  -Recheck lactate at 1800 on 2/24 is 1.1  -Continue volume resuscitation with normal saline  -Pain control with acetaminophen, ibuprofen, tizanidine, and oxycodone  -Blood cultures x 2 growing GNR, awaiting speciation and sensitivities.  -Repeat blood culture today     Multiple sclerosis  The patient receives ocrelizumab every 6 months.  We will consult physical and occupational therapy.   -Home therapies     NARCISA  The patient is intolerant of CPAP.     Depression and anxiety  Ordered Zoloft 25 mg for 2/24, then 50 mg at bedtime starting 2/25.     Hypertension  Hold lisinopril and metoprolol due to severe sepsis.     Morbid obesity  Lymphedema  Patient should follow-up with lymphedema clinic.     Polycystic ovarian syndrome  Continue prior to admission birth control pills.    DVT Prophylaxis: Enoxaparin (Lovenox) SQ  Code Status: Full Code    Disposition: Expected discharge in 2-3 days once bacteremia resolved and sensitivities back.    Agapito Urias    Interval History   The patient states her left thigh pain has improved.  She denies further fevers or chills.    -Data reviewed today: I reviewed all new labs and imaging results over the last 24 hours. I personally reviewed no images or EKG's today.    Physical Exam   Temp: 98.4  F (36.9  C) Temp src: Oral BP: (!) 145/78 Pulse: 103 Heart Rate: 110 Resp: 18 SpO2: 94 % O2 Device: None (Room air) Oxygen Delivery: 3  LPM  Vitals:    02/24/20 1201 02/24/20 1701 02/25/20 0543   Weight: (!) 186 kg (410 lb) (!) 176.5 kg (389 lb 1.8 oz) (!) 175.1 kg (386 lb 0.4 oz)     Vital Signs with Ranges  Temp:  [98.3  F (36.8  C)-101.9  F (38.8  C)] 98.4  F (36.9  C)  Pulse:  [101-138] 103  Heart Rate:  [108-138] 110  Resp:  [18-41] 18  BP: (110-163)/(49-82) 145/78  SpO2:  [85 %-100 %] 94 %  I/O last 3 completed shifts:  In: 806 [I.V.:806]  Out: 400 [Urine:400]    Gen: Well nourished, well developed, alert and oriented x 3, no acute distressed  HEENT: Atraumatic, normocephalic; sclera non-injected, anicterric; oral mucosa moist, no lesion, no exudate, normal dentition  Lungs: Clear to ausculation, no wheezes, no rhonchi, no rales  Heart: Regular rate, regular rhythm, no gallops, no rubs, 2/6 ДМИТРИЙ  GI: Bowel sound normal, no hepatosplenomegaly, no masses, non-tender, non-distended, no guarding, no rebound tenderness  Lymph: No lymphadenopathy, BLE woody edema  Skin: Tenderness and blanching erythema left ankle to left thigh, with BLE chronic venous stasis and woody edema    Medications     sodium chloride 150 mL/hr at 02/25/20 0929       cefTRIAXone  2 g Intravenous Q24H     enoxaparin ANTICOAGULANT  40 mg Subcutaneous Q24H     influenza vaccine adult (product based on age)  0.5 mL Intramuscular Prior to discharge     sertraline  50 mg Oral Daily     sodium chloride (PF)  3 mL Intravenous Q8H     sodium chloride (PF)  3 mL Intravenous Q8H       Data   Recent Labs   Lab 02/25/20  0514 02/24/20  1233   WBC 13.5* 20.9*   HGB 12.3 14.6   MCV 84 84    329    136   POTASSIUM 3.9 3.8   CHLORIDE 108 103   CO2 25 25   BUN 13 14   CR 0.62 0.65   ANIONGAP 5 8   EVITA 8.5 10.1   * 124*   ALBUMIN  --  3.5   PROTTOTAL  --  8.1   BILITOTAL  --  1.0   ALKPHOS  --  58   ALT  --  22   AST  --  18       No results found for this or any previous visit (from the past 24 hour(s)).

## 2020-02-25 NOTE — PROGRESS NOTES
02/25/20 0800   Quick Adds   Type of Visit Initial PT Evaluation   Living Environment   Lives With alone   Living Arrangements   ([facility])   Home Accessibility no concerns;wheelchair accessible   Functional Level Prior   Ambulation 1-->assistive equipment  (walker and wheelchair)   Transferring 1-->assistive equipment  (walker)   Toileting 0-->independent  (uses a commode independently at home)   Bathing 1-->assistive equipment  (shower chair in walk-in shower)   Communication 0-->understands/communicates without difficulty   Swallowing 0-->swallows foods/liquids without difficulty   Cognition 0 - no cognition issues reported   Fall history within last six months no   Which of the above functional risks had a recent onset or change? none  (only increased pain with ambulation)   Prior Functional Level Comment PLOF- Pt reports at home she ambulates short distances to the BR only; then reliant on a manual wheelchair for mobility. Pt employed-. walks into her  the  Building at work ; then uses a manual wc. Limited also  due to L hip OA    General Information   Onset of Illness/Injury or Date of Surgery - Date 02/24/20   Referring Physician Dr Urias   Patient/Family Goals Statement Return home   Pertinent History of Current Problem (include personal factors and/or comorbidities that impact the POC) 44 year old female admitted on 2/24/2020. She presents with left posterior thigh pain.; admitted w/ Sepsis due to left leg cellulitis;    Precautions/Limitations fall precautions   General Observations Pt alert- reports moderate Left LE pain but states her pain  has improved since yesterday   Pain Assessment   Patient Currently in Pain Yes, see Vital Sign flowsheet   Integumentary/Edema   Integumentary/Edema Comments See RN notes- LES very edematous. Left LE reddened.  Pt scheduled for OP Lymphedema 2/27/20. Reports this was beneficial  previously, but unable to keo wraps, pursuing assistance to help w/ this   Posture   "  Posture Comments BMI 68.38   Strength   Strength Comments NT does appear weak-  initiating AG strength in LES    Bed Mobility   Bed Mobility Comments Min. assistance of 2 . difficult w/ HOB elevated, but sleeps in a recliner lift chair at home   Transfer Skills   Transfer Comments CGA of one w/ RW; uses momemtum to stand    Gait   Gait Comments Pt ambulated in her room 10 feet x2 with RW and SB Aof one. Flexed foward, wide based gait  wtih decreased  step ht but pt reports she feels she is at baseline w/ her ambulation and transfers   Balance   Balance Comments fair due to weakness   Sensory Examination   Sensory Perception Comments hx of neuropathy in her feet. senstaion intact to LT    Clinical Impression   Criteria for Skilled Therapeutic Intervention evaluation only;no problems identified which require skilled intervention;current level of function same as previous level of function   Clinical Presentation Stable/Uncomplicated   Clinical Presentation Rationale clinical judgement   Clinical Decision Making (Complexity) Low complexity   Risk & Benefits of therapy have been explained Yes   Patient, Family & other staff in agreement with plan of care Yes   Clinical Impression Comments One time session as pt at baseline w/ transfers and ambulation. Pt does struggle, but has  wheelchair and a walker she uses at home. Pt  scheduled for OP Lymphedema    [facility] AM-PAC  \"6 Clicks\" V.2 Basic Mobility Inpatient Short Form   1. Turning from your back to your side while in a flat bed without using bedrails? 3 - A Little   2. Moving from lying on your back to sitting on the side of a flat bed without using bedrails? 3 - A Little   3. Moving to and from a bed to a chair (including a wheelchair)? 3 - A Little   4. Standing up from a chair using your arms (e.g., wheelchair, or bedside chair)? 3 - A Little   5. To walk in hospital room? 3 - A Little   6. Climbing 3-5 steps with a railing? 2 - A Lot   Basic Mobility " Raw Score (Score out of 24.Lower scores equate to lower levels of function) 17

## 2020-02-25 NOTE — CONSULTS
Care Transition Initial Assessment - RN      Met with: Patient.    DATA  Active Problems:    Severe sepsis (H)    Cellulitis       Cognitive Status: awake, alert and oriented.  Primary Care Clinic Name: [facility]  Primary Care MD Name: edelmira  Contact information and PCP information verified: Yes  Lives With: alone   Living Arrangements: ([facility])  Quality of Family Relationships: supportive  Description of Support System: Supportive   Who is your support system?: Sibling(s)   Support Assessment: Adequate family and caregiver support   Insurance concerns: No Insurance issues identified        This writer met with pt, introduced self and role. Patient currently lives alone in a [facility] in [place].  She has no current in home services.  She continues to drive.  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  She has found it increasingly difficult to function at work and at home.  She uses a walker for all ambulation.  She also has access to a wheelchair and shower chair.  She has had difficulty managing her leg wounds and care.  Her brother and sister live in [place].  They are supportive but not involved.  Discussed discharge planning and medicare guidelines in regards to home care and SNF benefits. Patient is open to home care at discharge. Pt/family was given the Medicare Compare list for Home Care, with associated star ratings to assist with choice for referrals/discharge planning Yes    Education was given to pt/family that star ratings are updated/maintained by Medicare and can be reviewed by visiting www.medicare.gov Yes.  Pt was provided with Medicare certified home care list. Pt chooses to use [facility] ([phone] Fax: [phone]).  Order placed for RN, PT-lymphedema, HHA and ALFRED.  Encouraged patient to connect directly with [facility] regarding disability and help with county assistance  for increasing services at home.       PLAN    [facility]    MYRIAM Mike RN  Inpatient Care Coordinator  [facility] [phone]  [facility] [phone]      HOME CARE HAND OFF  Patient Name: Bess Enamorado    MRN: [id number]    : 1975    Patient Zip Code: [address]    Admit Diagnosis: Cellulitis of left lower extremity [L03.116]  Severe sepsis (H) [A41.9, R65.20]      Services Pt Needs at Home: RN, PT HHA and Other:SW    Discharge Support: Independent-Alone    Living Arrangements: Alone     or Address Other Than Pt: No    Wound Care: Yes: lymphedema    Anticipate DC Date: 2020

## 2020-02-25 NOTE — PROGRESS NOTES
"Received report of \"R arm x 2 from 2/24 of prelim gram neg rods. Assigned night nurse aware and rounding MD aware.   "

## 2020-02-25 NOTE — PLAN OF CARE
OT Screen:    Discussion had with client in regards to ADL needs.  Client voices she has the adaptive equipment that she needs at home and is managing with BADL's well.  At this time she is deferring OT for services.    Sravan Mcmahan, OTR/L

## 2020-02-25 NOTE — PLAN OF CARE
Tylenol PRN given for headache. Purewick in place and adjusted since it was not effective. Border recession noted especially on Right thigh.  IV fluids infusing as ordered.

## 2020-02-25 NOTE — PROGRESS NOTES
Sepsis Triggered. Dr. Urias paged with this information.       Patient meets 2+ SIRS criteria to be screened for sepsis.  * Get stat lactic acid level  * Monitor vitals every 30 min for 1 hour.      Most Recent Vitals:     Temp: 99.2  F (37.3  C) (02/25 1433)  Pulse: 102 (02/25 1433)  Heart Rate: 110 (02/24 2034)  Resp: 18 (02/25 1433)  BP: 146/87 (02/25 1433)    WBC: 13.5 (H)

## 2020-02-25 NOTE — PLAN OF CARE
"A&O. Up to bedside commode x 1 with assist x 2 and walker. May need to use lift for repo and transfer if 2-3 people not available. Pt has a hard time getting back into bed after getting. Regular diet tolerated well with no reports of N&V. None to mild reports of pain in left hip and BLE. Redness remains within markings on BLE caused by cellulitis. Continues to have +3 edema in BLE. Pt states that she does have a \"decent amount\" of redness and swelling in BLE at baseline. No open wounds or weeping from BLE. IV infusing continuous NS @ 150 ml/hr. New alexander wick put in place and new suction canister at 2230. Lactic in ED was 4.4 and recheck before coming to the floor was normal at 1.1, per Dr. Urias we do not need to order another recheck. Pt on schedule Rocephin. Pt did spike a temp of 101.9 at arrival to the floor, Tylenol was given and the recheck was 98.9. Pt was on 5L O2 via NC, but is now weaned down to 3L at 94%. Does not use O2 at home. Pt from home alone. Walker from home is in room. Blood cultures pending. /58 (BP Location: Left arm)   Pulse 111   Temp 98.9  F (37.2  C) (Oral)   Resp 22   Ht 1.6 m (5' 3\")   Wt (!) 176.5 kg (389 lb 1.8 oz)   LMP 01/27/2020   SpO2 94%   BMI 68.93 kg/m  .       Maeve Goode RN on 2/24/2020 at 11:11 PM    "

## 2020-02-25 NOTE — PROGRESS NOTES
Skin affirmation note    Admitting nurse completed full skin assessment, Ben score and Ben interventions. This writer agrees with the initial skin assessment findings.

## 2020-02-26 LAB
ERYTHROCYTE [DISTWIDTH] IN BLOOD BY AUTOMATED COUNT: 15.6 % (ref 10–15)
HCT VFR BLD AUTO: 40.3 % (ref 35–47)
HGB BLD-MCNC: 12.7 G/DL (ref 11.7–15.7)
MCH RBC QN AUTO: 26.6 PG (ref 26.5–33)
MCHC RBC AUTO-ENTMCNC: 31.5 G/DL (ref 31.5–36.5)
MCV RBC AUTO: 84 FL (ref 78–100)
PLATELET # BLD AUTO: 204 10E9/L (ref 150–450)
RBC # BLD AUTO: 4.78 10E12/L (ref 3.8–5.2)
WBC # BLD AUTO: 9 10E9/L (ref 4–11)

## 2020-02-26 PROCEDURE — 12000000 ZZH R&B MED SURG/OB

## 2020-02-26 PROCEDURE — 85027 COMPLETE CBC AUTOMATED: CPT | Performed by: INTERNAL MEDICINE

## 2020-02-26 PROCEDURE — 25000132 ZZH RX MED GY IP 250 OP 250 PS 637: Performed by: INTERNAL MEDICINE

## 2020-02-26 PROCEDURE — 25000128 H RX IP 250 OP 636: Performed by: INTERNAL MEDICINE

## 2020-02-26 PROCEDURE — 99232 SBSQ HOSP IP/OBS MODERATE 35: CPT | Performed by: INTERNAL MEDICINE

## 2020-02-26 RX ADMIN — ACETAMINOPHEN 650 MG: 325 TABLET, FILM COATED ORAL at 17:11

## 2020-02-26 RX ADMIN — ENOXAPARIN SODIUM 40 MG: 40 INJECTION SUBCUTANEOUS at 17:11

## 2020-02-26 RX ADMIN — CEFTRIAXONE SODIUM 2 G: 2 INJECTION, SOLUTION INTRAVENOUS at 13:53

## 2020-02-26 RX ADMIN — OXYCODONE HYDROCHLORIDE 5 MG: 5 TABLET ORAL at 18:41

## 2020-02-26 RX ADMIN — SERTRALINE HYDROCHLORIDE 50 MG: 50 TABLET ORAL at 08:15

## 2020-02-26 ASSESSMENT — ACTIVITIES OF DAILY LIVING (ADL)
ADLS_ACUITY_SCORE: 16

## 2020-02-26 ASSESSMENT — MIFFLIN-ST. JEOR: SCORE: 2406.13

## 2020-02-26 NOTE — PLAN OF CARE
"A&O. Up to bedside commode with assist x 1 and walker, unless she is in the chair, then she can get to the commode independently. Pt has a hard time getting back into bed. Regular diet tolerated well with no reports of N&V. None to mild reports of pain in left hip and BLE. Refused pain medication. Reported a mild headache today, which Tylenol relieved. Redness receeding from markings made yesterday on BLE caused by cellulitis. Continues to have +3 edema in BLE. Pt states that she does have a \"decent amount\" of redness and swelling in LLE at baseline. No open wounds or weeping from BLE. IV infusing continuous NS @ 150 ml/hr. Lactic was 1.0 this evening after sepsis was triggered. Pt on schedule Rocephin and the dose was increased today from 1g to 2g. Highest temp today was 100.3. Pt on RA today and maintaining appropriate sats. BP (!) 150/78 (BP Location: Left arm)   Pulse 108   Temp 99  F (37.2  C) (Oral)   Resp 20   Ht 1.6 m (5' 3\")   Wt (!) 175.1 kg (386 lb 0.4 oz)   LMP 01/27/2020   SpO2 95%   BMI 68.38 kg/m  .      Maeve Goode RN on 2/25/2020 at 8:53 PM    "

## 2020-02-26 NOTE — PROGRESS NOTES
[facility]    Hospitalist Progress Note    Date of Service (when I saw the patient): 02/26/2020    Assessment & Plan   Bess Enamorado is a 44 year old female who was admitted on 2/24/2020. She presents with left posterior thigh pain.     Sepsis due to left leg cellulitis  Initial lactate was 4.4.  Patient was given 2 L normal saline in the emergency department as well as started on ceftriaxone.  Point-of-care ultrasound was negative for abscess in the upper thigh.  She was given ibuprofen for pain.    -Continue Rocephin.  -Recheck lactate at 1800 on 2/24 is 1.1  -Continue volume resuscitation with normal saline  -Pain control with acetaminophen, ibuprofen, tizanidine, and oxycodone  -Blood cultures x 2 from 2/24 growing GNR, awaiting speciation and sensitivities.  -Repeat blood culture 2/25 NGTD     Multiple sclerosis  The patient receives ocrelizumab every 6 months.  We will consult physical and occupational therapy.   -Home therapies     NARCISA  The patient is intolerant of CPAP.     Depression and anxiety  Ordered Zoloft 25 mg for 2/24, then 50 mg at bedtime starting 2/25.     Hypertension  Hold lisinopril and metoprolol due to severe sepsis.     Morbid obesity  Lymphedema  Patient should follow-up with lymphedema clinic.     Polycystic ovarian syndrome  Continue prior to admission birth control pills.    DVT Prophylaxis: Enoxaparin (Lovenox) SQ  Code Status: Full Code    Disposition: Expected discharge in 1-2 days once bacteremia resolved and sensitivities back.    Agapito Urias    Interval History   The patient states her left thigh pain has improved.  She denies further fevers or chills.    -Data reviewed today: I reviewed all new labs and imaging results over the last 24 hours. I personally reviewed no images or EKG's today.    Physical Exam   Temp: 98.8  F (37.1  C) Temp src: Oral BP: (!) 144/84 Pulse: 112 Heart Rate: 101 Resp: 20 SpO2: 94 % O2 Device: None (Room air)    Vitals:     02/24/20 1701 02/25/20 0543 02/26/20 0430   Weight: (!) 176.5 kg (389 lb 1.8 oz) (!) 175.1 kg (386 lb 0.4 oz) (!) 179.2 kg (395 lb 1 oz)     Vital Signs with Ranges  Temp:  [98.4  F (36.9  C)-100.3  F (37.9  C)] 98.8  F (37.1  C)  Pulse:  [101-118] 112  Heart Rate:  [101-106] 101  Resp:  [18-20] 20  BP: (136-164)/(60-91) 144/84  SpO2:  [90 %-98 %] 94 %  I/O last 3 completed shifts:  In: 2236 [I.V.:2236]  Out: 650 [Urine:650]    Gen: Well nourished, well developed, alert and oriented x 3, no acute distressed  HEENT: Atraumatic, normocephalic; sclera non-injected, anicterric; oral mucosa moist, no lesion, no exudate, normal dentition  Lungs: Clear to ausculation, no wheezes, no rhonchi, no rales  Heart: Regular rate, regular rhythm, no gallops, no rubs, 2/6 ДМИТРИЙ  GI: Bowel sound normal, no hepatosplenomegaly, no masses, non-tender, non-distended, no guarding, no rebound tenderness  Lymph: No lymphadenopathy, BLE woody edema  Skin: Tenderness and blanching erythema left ankle to left thigh, with BLE chronic venous stasis and woody edema    Medications       cefTRIAXone  2 g Intravenous Q24H     enoxaparin ANTICOAGULANT  40 mg Subcutaneous Q24H     influenza vaccine adult (product based on age)  0.5 mL Intramuscular Prior to discharge     sertraline  50 mg Oral Daily     sodium chloride (PF)  3 mL Intravenous Q8H     sodium chloride (PF)  3 mL Intravenous Q8H       Data   Recent Labs   Lab 02/26/20  0535 02/25/20  0514 02/24/20  1233   WBC 9.0 13.5* 20.9*   HGB 12.7 12.3 14.6   MCV 84 84 84    213 329   NA  --  138 136   POTASSIUM  --  3.9 3.8   CHLORIDE  --  108 103   CO2  --  25 25   BUN  --  13 14   CR  --  0.62 0.65   ANIONGAP  --  5 8   EVITA  --  8.5 10.1   GLC  --  125* 124*   ALBUMIN  --   --  3.5   PROTTOTAL  --   --  8.1   BILITOTAL  --   --  1.0   ALKPHOS  --   --  58   ALT  --   --  22   AST  --   --  18       No results found for this or any previous visit (from the past 24 hour(s)).

## 2020-02-26 NOTE — PLAN OF CARE
Patient alert and oriented x4. Denied pain. Vss. Ivf TKO. Slept in chair half of the night with legs elevated, then returned to bed. Leg erythema receeding from pen marking, blisters and some weeping present.

## 2020-02-26 NOTE — PLAN OF CARE
Receiving IV Antibiotics as ordered, denies discomfort at present time.  Both legs remain edematous with redness receding, will monitor.

## 2020-02-27 VITALS
WEIGHT: 293 LBS | OXYGEN SATURATION: 97 % | HEART RATE: 93 BPM | DIASTOLIC BLOOD PRESSURE: 73 MMHG | BODY MASS INDEX: 51.91 KG/M2 | HEIGHT: 63 IN | SYSTOLIC BLOOD PRESSURE: 130 MMHG | TEMPERATURE: 98.6 F | RESPIRATION RATE: 16 BRPM

## 2020-02-27 LAB
CREAT SERPL-MCNC: 0.64 MG/DL (ref 0.52–1.04)
GFR SERPL CREATININE-BSD FRML MDRD: >90 ML/MIN/{1.73_M2}
PLATELET # BLD AUTO: 221 10E9/L (ref 150–450)

## 2020-02-27 PROCEDURE — 36415 COLL VENOUS BLD VENIPUNCTURE: CPT | Performed by: INTERNAL MEDICINE

## 2020-02-27 PROCEDURE — 85049 AUTOMATED PLATELET COUNT: CPT | Performed by: INTERNAL MEDICINE

## 2020-02-27 PROCEDURE — 90686 IIV4 VACC NO PRSV 0.5 ML IM: CPT | Performed by: INTERNAL MEDICINE

## 2020-02-27 PROCEDURE — 25000128 H RX IP 250 OP 636: Performed by: INTERNAL MEDICINE

## 2020-02-27 PROCEDURE — 82565 ASSAY OF CREATININE: CPT | Performed by: INTERNAL MEDICINE

## 2020-02-27 PROCEDURE — 25000132 ZZH RX MED GY IP 250 OP 250 PS 637: Performed by: INTERNAL MEDICINE

## 2020-02-27 PROCEDURE — 99239 HOSP IP/OBS DSCHRG MGMT >30: CPT | Performed by: INTERNAL MEDICINE

## 2020-02-27 RX ORDER — CEFIXIME 400 MG/1
400 CAPSULE ORAL DAILY
Qty: 7 CAPSULE | Refills: 0 | Status: SHIPPED | OUTPATIENT
Start: 2020-02-27 | End: 2020-03-23

## 2020-02-27 RX ADMIN — SERTRALINE HYDROCHLORIDE 50 MG: 50 TABLET ORAL at 07:55

## 2020-02-27 RX ADMIN — INFLUENZA A VIRUS A/BRISBANE/02/2018 IVR-190 (H1N1) ANTIGEN (FORMALDEHYDE INACTIVATED), INFLUENZA A VIRUS A/KANSAS/14/2017 X-327 (H3N2) ANTIGEN (FORMALDEHYDE INACTIVATED), INFLUENZA B VIRUS B/PHUKET/3073/2013 ANTIGEN (FORMALDEHYDE INACTIVATED), AND INFLUENZA B VIRUS B/MARYLAND/15/2016 BX-69A ANTIGEN (FORMALDEHYDE INACTIVATED) 0.5 ML: 15; 15; 15; 15 INJECTION, SUSPENSION INTRAMUSCULAR at 08:06

## 2020-02-27 ASSESSMENT — ACTIVITIES OF DAILY LIVING (ADL)
ADLS_ACUITY_SCORE: 16
ADLS_ACUITY_SCORE: 18
ADLS_ACUITY_SCORE: 22
ADLS_ACUITY_SCORE: 22

## 2020-02-27 ASSESSMENT — MIFFLIN-ST. JEOR: SCORE: 2390.13

## 2020-02-27 NOTE — DISCHARGE INSTRUCTIONS
Social Security Administration website of SSA.gov is a great resource for information on how to apply for disability.     Care Coordination Services will be contacting you via phone to discuss long term care needs.

## 2020-02-27 NOTE — PROGRESS NOTES
Name: Bess Enamorado    MRN#: [id number]    Reason for Hospitalization: Cellulitis of left lower extremity [L03.116]  Severe sepsis (H) [A41.9, R65.20]    Discharge Date: 2/27/2020    Patient / Family response to discharge plan: Yes    Other Providers (Care Coordinator, County Services, PCA services etc): Yes:  [facility] RN, PT, HHA & Lymphedema services to begin post discharge    CTS Hand Off Completed: Yes:  Complex medical status due to MS and lymphedema.      PAS #: NA    CHRISTIE Score: 49    Future Appointments:   Future Appointments   Date Time Provider Department Center   4/6/2020  4:00 PM Mikala Luna MD [facility]       Discharge Disposition: home with [facility] services and pt has requested that clinic SW call her to assist with long term needs    Discharge Planner   Discharge Plans in progress: Home today.  Pt will drive self home  Barriers to discharge plan: None  Follow up plan: Clinic appointment       Entered by: Sandra Sanders 02/27/2020 10:40 AM           Lucille SABILLON, [facility], [facility] [phone]

## 2020-02-27 NOTE — PLAN OF CARE
[facility] [facility] DISCHARGE NOTE    Patient discharged to home at 1:34 PM via wheel chair. Accompanied by self and staff. Discharge instructions reviewed with patient, opportunity offered to ask questions. Prescriptions sent to patients preferred pharmacy. All belongings sent with patient.    Maria Eugenia Ernandez RN

## 2020-02-27 NOTE — PLAN OF CARE
Denies pain.  Both lower extremities red, left leg more red than right.  Red area has not gone out of marked area.  Afebrile overnight

## 2020-02-27 NOTE — DISCHARGE SUMMARY
[facility]  Hospitalist Discharge Summary       Date of Admission:  2/24/2020  Date of Discharge:  2/27/2020  Discharging Provider: Agapito Urias MD      Discharge Diagnoses     Sepsis due to left leg cellulitis  Multiple sclerosis  NARCISA  Depression and anxiety  Hypertension  Morbid obesity  Lymphedema  Polycystic ovarian syndrome    Follow-ups Needed After Discharge   Follow-up Appointments     Follow-up and recommended labs and tests      Follow up with primary care provider, Mikala Luna, within 7 days for   hospital follow- up.  The following labs/tests are recommended: CBC.           Unresulted Labs Ordered in the Past 30 Days of this Admission     Date and Time Order Name Status Description    2/25/2020 1107 Blood culture Preliminary     2/24/2020 1255 Blood culture Preliminary     2/24/2020 1255 Blood culture Preliminary       These results will be followed up by PCP and Dr. Agapito Urias    Discharge Disposition   Discharged to home  Condition at discharge: Stable    Hospital Course   Bess Enamorado is a 44 year old female who was admitted on 2/24/2020. She presents with left posterior thigh pain.     Sepsis due to left leg cellulitis  Initial lactate was 4.4.  Patient was given 2 L normal saline in the emergency department as well as started on ceftriaxone.  Point-of-care ultrasound was negative for abscess in the upper thigh.  She was given ibuprofen for pain.    -Treated with Rocephin.  -Recheck lactate at 1800 on 2/24 is 1.1  -Treated with volume resuscitation with normal saline  -Pain control with acetaminophen, ibuprofen, tizanidine, and oxycodone  -Blood cultures x 2 from 2/24 growing GNR, awaiting speciation and sensitivities.  -Repeat blood culture 2/25 NGTD  -Clinically improved, will discharge on oral antibiotic     Multiple sclerosis  The patient receives ocrelizumab every 6 months.  We will consult physical and occupational therapy.   -Home therapies     NARCISA  The  patient is intolerant of CPAP.     Depression and anxiety  Ordered Zoloft 25 mg for 2/24, then 50 mg at bedtime starting 2/25.     Hypertension  Hold lisinopril and metoprolol due to severe sepsis.  -Resume on discharge     Morbid obesity  Lymphedema  Patient should follow-up with lymphedema clinic.     Polycystic ovarian syndrome  Continue prior to admission birth control pills.    DVT Prophylaxis: Enoxaparin (Lovenox) SQ  Code Status: Full Code    Consultations This Hospital Stay   PHYSICAL THERAPY ADULT IP CONSULT  OCCUPATIONAL THERAPY ADULT IP CONSULT  CARE TRANSITION RN/SW IP CONSULT    Code Status   Full Code    Time Spent on this Encounter   I, Agapito Urias MD, personally saw the patient today and spent greater than 30 minutes discharging this patient.       Agapito Urias MD  [facility]  ______________________________________________________________________    Physical Exam   Vital Signs: Temp: 98.3  F (36.8  C) Temp src: Oral BP: (!) 142/89 Pulse: 105 Heart Rate: 97 Resp: 18 SpO2: 93 % O2 Device: None (Room air)    Weight: 391 lbs 8.59 oz    Gen: Well nourished, well developed, alert and oriented x 3, no acute distressed  HEENT: Atraumatic, normocephalic; sclera non-injected, anicterric; oral mucosa moist, no lesion, no exudate, normal dentition  Lungs: Clear to ausculation, no wheezes, no rhonchi, no rales  Heart: Regular rate, regular rhythm, no gallops, no rubs, 2/6 ДМИТРИЙ  GI: Bowel sound normal, no hepatosplenomegaly, no masses, non-tender, non-distended, no guarding, no rebound tenderness  Lymph: No lymphadenopathy, BLE woody edema  Skin: Tenderness and blanching erythema left ankle to left thigh, with BLE chronic venous stasis and woody edema       Primary Care Physician   Mikala Luna    Discharge Orders      Home Care Referral      Reason for your hospital stay    This is a 44 year old female admitted with left lower extremity cellulitis.     Follow-up and  recommended labs and tests    Follow up with primary care provider, Mikala Luna, within 7 days for hospital follow- up.  The following labs/tests are recommended: CBC.     Activity    Your activity upon discharge: activity as tolerated     Full Code     Diet    Follow this diet upon discharge: Orders Placed This Encounter      Combination Diet Regular Diet Adult       Significant Results and Procedures   Most Recent 3 CBC's:  Recent Labs   Lab Test 02/27/20  0515 02/26/20  0535 02/25/20  0514 02/24/20  1233   WBC  --  9.0 13.5* 20.9*   HGB  --  12.7 12.3 14.6   MCV  --  84 84 84    204 213 329     Most Recent 3 BMP's:  Recent Labs   Lab Test 02/27/20  0515 02/25/20  0514 02/24/20  1233 02/13/20  1641   NA  --  138 136 136   POTASSIUM  --  3.9 3.8 3.9   CHLORIDE  --  108 103 104   CO2  --  25 25 28   BUN  --  13 14 18   CR 0.64 0.62 0.65 0.69   ANIONGAP  --  5 8 4   EVITA  --  8.5 10.1 9.4   GLC  --  125* 124* 96   ,   Results for orders placed or performed during the hospital encounter of 02/24/20   POC US SOFT TISSUE    Impression    [facility] Procedure Note     Limited Bedside ED Ultrasound of Soft Tissue:    PROCEDURE: PERFORMED BY: Dr. Rehan Fuller MD  INDICATIONS/SYMPTOM: Skin redness, evaluate for abscess, cellulitis or foreign body  PROBE: High frequency linear probe  BODY LOCATION: Soft tissue located on extremity     FINDINGS: Cobblestoning of soft tissue: present   Hypoechoic fluid (ie abscess) identified: absent      INTERPRETATION:  The soft tissue and muscle layers were evaluated.      Findings indicate cellulitis    IMAGE DOCUMENTATION: Images were archived to PACs system.        Discharge Medications   Current Discharge Medication List      START taking these medications    Details   cefixime (SUPRAX) 400 MG capsule Take 1 capsule (400 mg) by mouth daily  Qty: 7 capsule, Refills: 0    Associated Diagnoses: Severe sepsis (H); Cellulitis of left lower extremity         CONTINUE  these medications which have NOT CHANGED    Details   acetaminophen (TYLENOL) 500 MG tablet Take 500-1,000 mg by mouth once as needed for mild pain      desogestrel-ethinyl estradiol (APRI) 0.15-30 MG-MCG tablet TAKE 1 TABLET DAILY CONTINUOUSLY-OMIT PLACEBPO TABS UNTIL AFTER 3 MONTHS OF HORMONE TABS  Qty: 112 tablet, Refills: 3    Associated Diagnoses: Polycystic ovaries; Encounter for surveillance of contraceptive pills      lisinopril (PRINIVIL/ZESTRIL) 20 MG tablet Take 1 tablet (20 mg) by mouth daily  Qty: 90 tablet, Refills: 3    Associated Diagnoses: Benign essential hypertension      metoprolol succinate ER (TOPROL-XL) 25 MG 24 hr tablet Take 1 tablet (25 mg) by mouth daily  Qty: 90 tablet, Refills: 1    Associated Diagnoses: Benign essential hypertension; Sinus tachycardia      Ocrelizumab (OCREVUS IV) Inject into the vein every 6 months In clinic      sertraline (ZOLOFT) 50 MG tablet Take 1 tablet (50 mg) by mouth daily  Qty: 30 tablet, Refills: 0    Associated Diagnoses: Moderate episode of recurrent major depressive disorder (H)      Cholecalciferol (VITAMIN D3 PO) Take 10,000 Units by mouth daily       order for DME Equipment ordered: RESMED CPAP Mask type: Nasal Settings: 10 CM H2O           Allergies   Allergies   Allergen Reactions     Nkda [No Known Drug Allergies]

## 2020-02-28 ENCOUNTER — TELEPHONE (OUTPATIENT)
Dept: FAMILY MEDICINE | Facility: CLINIC | Age: 45
End: 2020-02-28

## 2020-02-28 ENCOUNTER — PATIENT OUTREACH (OUTPATIENT)
Dept: CARE COORDINATION | Facility: CLINIC | Age: 45
End: 2020-02-28

## 2020-02-28 SDOH — ECONOMIC STABILITY: INCOME INSECURITY: HOW HARD IS IT FOR YOU TO PAY FOR THE VERY BASICS LIKE FOOD, HOUSING, MEDICAL CARE, AND HEATING?: NOT HARD AT ALL

## 2020-02-28 SDOH — HEALTH STABILITY: PHYSICAL HEALTH: ON AVERAGE, HOW MANY MINUTES DO YOU ENGAGE IN EXERCISE AT THIS LEVEL?: 0 MIN

## 2020-02-28 SDOH — HEALTH STABILITY: PHYSICAL HEALTH: ON AVERAGE, HOW MANY DAYS PER WEEK DO YOU ENGAGE IN MODERATE TO STRENUOUS EXERCISE (LIKE A BRISK WALK)?: 0 DAYS

## 2020-02-28 SDOH — ECONOMIC STABILITY: TRANSPORTATION INSECURITY
IN THE PAST 12 MONTHS, HAS THE LACK OF TRANSPORTATION KEPT YOU FROM MEDICAL APPOINTMENTS OR FROM GETTING MEDICATIONS?: NO

## 2020-02-28 SDOH — ECONOMIC STABILITY: FOOD INSECURITY: WITHIN THE PAST 12 MONTHS, THE FOOD YOU BOUGHT JUST DIDN'T LAST AND YOU DIDN'T HAVE MONEY TO GET MORE.: NEVER TRUE

## 2020-02-28 SDOH — ECONOMIC STABILITY: FOOD INSECURITY: WITHIN THE PAST 12 MONTHS, YOU WORRIED THAT YOUR FOOD WOULD RUN OUT BEFORE YOU GOT THE MONEY TO BUY MORE.: NEVER TRUE

## 2020-02-28 SDOH — ECONOMIC STABILITY: TRANSPORTATION INSECURITY: IN THE PAST 12 MONTHS, HAS LACK OF TRANSPORTATION KEPT YOU FROM MEDICAL APPOINTMENTS OR FROM GETTING MEDICATIONS?: NO

## 2020-02-28 SDOH — ECONOMIC STABILITY: FOOD INSECURITY: WITHIN THE PAST 12 MONTHS, YOU WORRIED THAT YOUR FOOD WOULD RUN OUT BEFORE YOU GOT MONEY TO BUY MORE.: NEVER TRUE

## 2020-02-28 SDOH — ECONOMIC STABILITY: TRANSPORTATION INSECURITY
IN THE PAST 12 MONTHS, HAS LACK OF TRANSPORTATION KEPT YOU FROM MEETINGS, WORK, OR FROM GETTING THINGS NEEDED FOR DAILY LIVING?: NO

## 2020-02-28 SDOH — ECONOMIC STABILITY: FOOD INSECURITY: HOW HARD IS IT FOR YOU TO PAY FOR THE VERY BASICS LIKE FOOD, HOUSING, MEDICAL CARE, AND HEATING?: NOT HARD AT ALL

## 2020-02-28 ASSESSMENT — ACTIVITIES OF DAILY LIVING (ADL): LACK_OF_TRANSPORTATION: NO

## 2020-02-28 NOTE — PROGRESS NOTES
Clinic Care Coordination Contact    Clinic Care Coordination Contact  OUTREACH    Referral Information:  Referral Source: IP Handoff    Primary Diagnosis: Cardiovascular - other    Chief Complaint   Patient presents with     Clinic Care Coordination - Post Hospital     sw        Universal Utilization: No concerns  Clinic Utilization  Difficulty keeping appointments:: No  Compliance Concerns: No  No-Show Concerns: No  No PCP office visit in Past Year: No  Utilization    Last refreshed: 2/28/2020  9:35 AM:  Hospital Admissions 1           Last refreshed: 2/28/2020  9:35 AM:  ED Visits 2           Last refreshed: 2/28/2020  9:35 AM:  No Show Count (past year) 0              Current as of: 2/28/2020  9:35 AM              Clinical Concerns:  Current Medical Concerns: Patient was discharged yesterday from the hospital and hospital problem list was severe sepsis and cellulitis.  Current Behavioral Concerns: Patient reports that she has depression and anxiety.  She did just start on a new medication to help.  Education Provided to patient: Patient voiced understanding of her antibiotic.  She does have trouble with any compression socks as she is unable to put them on.  She has compression socks and before yet they are not able to be donned at all.  She has home care assessment tomorrow and encouraged talking with them about ways to either get the compression socks on or if there is a different option to help.  Patient reports that her MS, although not progressive does get more involved each year.  She notes a lot of tiredness due to her MS.  She reports that she is able to get things done yet it takes lots of breaks and a long time to get any tasks completed.  Encouraged working with home care physical therapy and Occupational Therapy to help get a good system at home to reduce energy taxing events.    Patient reports that she does generally have anxiety yet feels her depression is overwhelming the anxiety.  She does feel a  lot of stress and anxiety about what the future holds if not able to return to work, which she feels she is not able to.  She did start on a new medication and said that her sister is a counselor.  Discussed the importance of finding someone she is comfortable with and that maybe her sister may not be the best 1 to help her work through those struggles.  Discussed future options of seeing a counselor within [facility].  She did not have any suicidal thoughts or thoughts of harming others.  Did add resources for the crisis line and warm lines to have people to call if she needs someone to talk to.  Pain  Pain (GOAL):: Yes  Type: Chronic (>3mo)  Location of chronic pain:: hips  Radiating: No  Progression: Constant  Description of pain: Sharp, Burning  Chronic pain severity:: 6  Limitation of routine activities due to chronic pain:: Yes  Description: Able to do most things most days with some rest  Alleviating Factors: Rest  Aggravating Factors: Activity, Positioning  Health Maintenance Reviewed: Due/Overdue ,hmd    Clinical Pathway: None    Medication Management:  Patient voiced understanding about her medications.  All new scripts were filled at the hospital and she has no questions or concerns.    Functional Status:  Dependent ADLs:: Wheelchair-independent  Dependent IADLs:: (able yet does get supports - takes longer to do on her own)  Bed or wheelchair confined:: No  Mobility Status: Independent w/Device  Fallen 2 or more times in the past year?: No  Any fall with injury in the past year?: No    Living Situation:  Current living arrangement:: I live alone  Type of residence:: Private home - no stairs    Lifestyle & Psychosocial Needs:  Lifestyle     Physical activity:     Days per week: 0 days     Minutes per session: 0 min     Stress: Not on file     Social Needs     Financial resource strain: Not hard at all     Food insecurity:     Worry: Never true     Inability: Never true     Transportation needs:     Medical:  No     Non-medical: No     Diet:: Regular  Inadequate nutrition (GOAL):: No  Tube Feeding: No  Inadequate activity/exercise (GOAL):: Yes  Significant changes in sleep pattern (GOAL): No  Transportation means:: Regular car     Anabaptist or spiritual beliefs that impact treatment:: No  Mental health DX:: Yes  Mental health DX how managed:: Medication  Mental health management concern (GOAL):: Yes  Informal Support system:: Family   Socioeconomic History     Marital status: Single     Spouse name: Not on file     Number of children: Not on file     Years of education: Not on file     Highest education level: Not on file   Occupational History     Employer: [employer]     Tobacco Use     Smoking status: Never Smoker     Smokeless tobacco: Never Used   Substance and Sexual Activity     Alcohol use: Yes     Comment: social     Drug use: No     Sexual activity: Not Currently     Partners: Male     Birth control/protection: Pill          Patient's main concern is applying for disability.  She noted she has been talked to her at work a couple times regarding her legs weeping and odor.  Patient does wear pull-ups and even though she will double or triple bake there is still a smell and she is being told by work she has to find a way to reduce this.  She also notes that due to her MS she struggles with trying to do the functions of her job.  She utilizes a rolling walker and a wheelchair for mobility.    [place] [facility]  She said everything is on 1 level with a walk-in shower.  There are no grab bars at this time.  She indicates she is able to take care of most of her needs yet it does take a taxing effort and a longer timeframe to get everything done.  She is able to cook and has her food delivered.  She does have some when she self pays for cleaning.    Thinks she only has short-term disability at work.  She is interested in looking into disability yet is uncertain where to go.  She does feel  that if she is not having to return to work and gets approved for disability that this will help with her depression and anxiety.  She feels that work increases her depression and anxiety with her health conditions.  Discussed options such as applying for disability through Social Security administration on her own, utilizing an agency to help her with that disability process, and going to the disability hub website.  Patient was sent resources to look into them decide which direction she would like to go to apply for disability.     Resources and Interventions:  Current Resources: see letters for  Legal, mental health, and disability resources sent  List of home care services:: (assessment scheduled for 2-)  Community Resources: None  Supplies used at home:: Incontinence Supplies  Equipment Currently Used at Home: wheelchair, manual, walker, rolling, shower chair, dressing device    Advance Care Plan/Directive  Advanced Care Plans/Directives on file:: No  Advanced Care Plan/Directive Status: Considering Options    Referrals Placed: Home Care     Goals:   Goals        General    Mental Health Management (pt-stated)     Notes - Note created  2/28/2020 10:00 AM by Sondra Callaway LSW    Goal Statement: I want to improve my depression by getting on disability so I don't have to work.   Date Goal set: 2/28/2020  Barriers: MS, energy level, disability process/time line  Strengths: care coordination, resources  Date to Achieve By: 8/26/20  Patient expressed understanding of goal: yes  Action steps to achieve this goal:  1. I will look through the resources sent by  and decide on a route for applying (on own or with agency support)                Patient/Caregiver understanding: Patient to look at resources sent and decide which route she would like to go.    Outreach Frequency: 2 weeks  Future Appointments              In 5 days Mikala Luna MD [facility], [facility]    In 1 month  Mikala Luna MD [facility]          Plan: Patient to review resources sent and consider which direction she would like to go for applying for disability.  Patient to utilize mental health resources as needed.  Patient to accept home care assessment and services if deemed appropriate.   to send complex care plan, introduction letter, and resources via my chart.   to call in about 2 weeks.    KAY Cotton, [facility] - Care Coordinator   [facility]  2/28/2020   10:39 AM  [phone]

## 2020-02-28 NOTE — LETTER
Sorry forgot to add the warm lines and crisis lines.  Just as a resource.        Care Connection Crisis line -   [phone]  o In addition to crisis response services through [employer], [address] also funds a Peer to Peer Telephone Support line through [employer] [facility], which is a safe and supportive place for people to call and speak with peers who are there to listen and support adults experiencing emotional distress. Call 1-823.919.7073 or visit https://www.ClarityAd.org.    **CRISIS (**830695) from cell phone-- and have their call seamlessly forwarded to their [facility] crisis team. **CRISIS will automatically connect to the right [facility] crisis number based on the caller s location.    Crisis Text Line is a text-based crisis line available 24/7. Text [place] to 122079.    Warm Line     [phone]  o Non crisis volunteer run lines  o 4pm - 10 pm Tuesday through Saturday  o Can google and find many more    ED warm line  o Phone: [phone]  Toll-Free: ([phone]) [phone]  Hours of Operation: 4:00 pm - 8:00 pm (Thursday through Sunday)

## 2020-02-28 NOTE — LETTER
[facility] CARE COORDINATION - [facility]  [address] [place] [address]  [phone]    February 28, 2020    Bess Enamorado  [address] [facility] [address]      Dear Bess,    I am a clinic care coordinator who works with Mikala Luna MD at [facility]. I wanted to thank you for spending the time to talk with me.  Below is a description of clinic care coordination and how I can further assist you.      Resources:       SSA - toll free [place]   [phone]    www.socialsecurity.gov  o To apply on line for SS    Https://www.ssa.gov/disabilityssi/ssi.html      Schedule an appointment with a local Social Security office to file an application. Call [phone] (TTY [phone]) from 7 a.m. to 7 p.m., Monday through Friday or contact your local Social Security office, or;        Find out if you are eligible to receive Social Security Disability Benefits. Learn more and start the disability process at our Apply Online for Disability Benefits page.     Although this is not a SSI application, we can use most of the information you provide to start the disability process. Once you finish the online process, a Social  will contact you for any additional information needed for the SSI application.      Disability benefits 101 - https://[place].db101.org  A great resource on  How to apply for disability and what you need to know.  There is a phone number and a link to ask questions on the page.    Legal supports      [facility]  - [facility] -  [phone]  o www.[facility] Hospitalgal.org   o [place] office - [place] - new client [phone]  o Apply on line for free legal help - to find out more go to  www.kliujqs9im.org/a2j     [facility] Legal Assistance   [phone]  o Free to low income people  o Forclosure Prevention  o http://mylegalaid.org/     [facility]   [phone]     [employer]-Free disability evaluation for  your patients   [phone]  Tell your patients to call [phone] and ask about filing an initial application or an appeal. Make certain they indicate the name of your organization or practice to receive expedited assistance. http://[email or web address]/[email or web address]  Jennifer Ponce Audrey/K121-assisting with applications   [phone]  http://[email or web address]/locations.html  http://www.Shelfari/eligibility-services.html      Disability Specialists   [phone]  o Not  but are licensed for disability and disability only    The clinic care coordinator team is made up of a registered nurse,  and community health worker who understand the health care system. The goal of clinic care coordination is to help you manage your health and improve access to the health care system in the most efficient manner. The team can assist you in meeting your health care goals by providing education, coordinating services, strengthening the communication among your providers  and supporting you with any resource needs.    Please feel free to contact me, at [phone] with any questions or concerns. We are focused on providing you with the highest-quality healthcare experience possible and that all starts with you.     Sincerely,     Sondra Callaway ONEYDA  [facility] - Care Coordination  [facility]   [phone]

## 2020-02-28 NOTE — LETTER
[facility]  Complex Care Plan  About Me:    Patient Name:  Bess Enamorado    YOB: 1975  Age:         44 year old   Karyn MRN:    [id number] Telephone Information:  Home Phone [phone]   Mobile [phone]       [address] [facility] [address] Email address:  [email or web address]      Emergency Contact(s)    Name Relationship Lgl Grd Work Phone Home Phone Mobile Phone   1. ROMEO ENAMORADO Father No  [phone]    2. MIKEY ENAMORADO Mother No  [phone]    3. NOEMÍ, CLARIBEL* Friend No  [phone]           Primary language:  English     needed? No   [facility]:  [phone] op. 1  Other communication barriers: None  Preferred Method of Communication:  Tameka  Current living arrangement: I live alone  Mobility Status/ Medical Equipment: Independent w/Device    Health Maintenance  Health Maintenance Reviewed: Due/Overdue   Health Maintenance Due   Topic Date Due     PREVENTIVE CARE VISIT  06/26/2018     DTAP/TDAP/TD IMMUNIZATION (5 - Td) 04/27/2019     Please talk with your provider about what is needed or can continue to wait.        My Access Plan  Medical Emergency 911   Primary Clinic Line [facility] - [phone]   24 Hour Appointment Line [phone] or  [phone] ([phone]) (toll-free)   24 Hour Nurse Line [phone] (toll-free)   Preferred Urgent Care [facility] [phone]   Preferred Hospital [place], [place]  [phone]   Preferred Pharmacy [facility] [place], [place] - [address]     Behavioral Health Crisis Line The National Suicide Prevention Lifeline at 1-427.359.2145 or 911             My Care Team Members  Patient Care Team       Relationship Specialty Notifications Start End    Mikala Luna MD PCP - General Family Practice  8/16/19     Phone: [phone] Pager: [phone] Fax: [phone]         [address] [facility] [address]    Mikala Luna MD Assigned PCP   6/16/19     Phone: [phone] Pager: [phone] Fax: [phone]        [address] [facility] [address]    Alan Roberts MD MD OB/Gyn  8/15/19     Phone: [phone] Fax: [phone]         [facility] [address] [facility] [address]    Sondra Callaway LSW Lead Care Coordinator Primary Care - CC Admissions 2/28/20     Phone: [phone] Fax: [phone]                My Care Plans  Self Management and Treatment Plan  Goals and (Comments)  Goals        General    Mental Health Management (pt-stated)     Notes - Note created  2/28/2020 10:00 AM by Sondra Callaway LSW    Goal Statement: I want to improve my depression by getting on disability so I don't have to work.   Date Goal set: 2/28/2020  Barriers: MS, energy level, disability process/time line  Strengths: care coordination, resources  Date to Achieve By: 8/26/20  Patient expressed understanding of goal: yes  Action steps to achieve this goal:  1. I will look through the resources sent by  and decide on a route for applying (on own or with agency support)                 Action Plans on File:            Depression          Advance Care Plans/Directives Type:        My Medical and Care Information  Problem List   Patient Active Problem List   Diagnosis     Multiple sclerosis (H)     Polycystic ovaries     Constipation     CARDIOVASCULAR SCREENING; LDL GOAL LESS THAN 160     Anxiety     Restless legs     Leg edema     Dyspnea and respiratory abnormality     Eating disorder     Papanicolaou smear of cervix with low grade squamous intraepithelial lesion (LGSIL)     Morbid obesity (H)     Peroneal tendon rupture     Benign essential hypertension     NARCISA (obstructive sleep apnea)     Moderate episode of recurrent major depressive disorder (H)     Cellulitis of abdominal wall     Sepsis (H)     History of abnormal cervical Pap smear      Severe sepsis (H)     Cellulitis      Current Medications and Allergies:  Allergies as of 2/28/2020   Reviewed by Maeve Goode RN on 2/24/2020    Severity Noted Reaction Type Reactions   Nkda [no Known Drug Allergies] Not Specified 04/27/2009       Medications - This is your record. Keep this with you and show to your community pharmacist(s) and physician(s) at each visit.      Instructions for use   acetaminophen (TYLENOL) 500 MG tablet Take 500-1,000 mg by mouth once as needed for mild pain   cefixime (SUPRAX) 400 MG capsule Take 1 capsule (400 mg) by mouth daily   Cholecalciferol (VITAMIN D3 PO) Take 10,000 Units by mouth daily    desogestrel-ethinyl estradiol (APRI) 0.15-30 MG-MCG tablet TAKE 1 TABLET DAILY CONTINUOUSLY-OMIT PLACEBPO TABS UNTIL AFTER 3 MONTHS OF HORMONE TABS   lisinopril (PRINIVIL/ZESTRIL) 20 MG tablet Take 1 tablet (20 mg) by mouth daily   metoprolol succinate ER (TOPROL-XL) 25 MG 24 hr tablet Take 1 tablet (25 mg) by mouth daily   Ocrelizumab (OCREVUS IV) Inject into the vein every 6 months In clinic   order for DME Equipment ordered: RESMED CPAP Mask type: Nasal Settings: 10 CM H2O   sertraline (ZOLOFT) 50 MG tablet Take 1 tablet (50 mg) by mouth daily    Patient taking differently: Take 25 mg by mouth every evening Will start taking whole tab 2/25/20 pm         Care Coordination Start Date: 2/28/2020   Frequency of Care Coordination: 2 weeks   Form Last Updated: 02/28/2020

## 2020-02-28 NOTE — TELEPHONE ENCOUNTER
ED/UC/IP follow up phone call:2/27/20 [facility] Severe Sepsis    RN please call to follow up.    Number of ED visits in past 12 months = 1/1  Ibeth Ferrer  Clinic Station

## 2020-03-02 LAB
BACTERIA SPEC CULT: ABNORMAL
BACTERIA SPEC CULT: NO GROWTH
Lab: ABNORMAL
SPECIMEN SOURCE: ABNORMAL
SPECIMEN SOURCE: ABNORMAL
SPECIMEN SOURCE: NORMAL

## 2020-03-03 ENCOUNTER — TELEPHONE (OUTPATIENT)
Dept: FAMILY MEDICINE | Facility: CLINIC | Age: 45
End: 2020-03-03

## 2020-03-03 NOTE — TELEPHONE ENCOUNTER
Reason for Call:  Home Health Care    Majo with [facility] called regarding (reason for call): OT verbal orders    Orders are needed for this patient.     OT: 1x1 and 3x7 Lower extremity lymphedema     Pt Provider: Dr. Lnua    Phone Number Homecare Nurse can be reached at: [phone]     Can we leave a detailed message on this number? YES - secure line    Phone number patient can be reached at: Home number on file [phone] (home)    Best Time: Any    Call taken on 3/3/2020 at 3:12 PM by Savannah Gross

## 2020-03-04 ENCOUNTER — OFFICE VISIT (OUTPATIENT)
Dept: FAMILY MEDICINE | Facility: CLINIC | Age: 45
End: 2020-03-04
Payer: COMMERCIAL

## 2020-03-04 ENCOUNTER — MEDICAL CORRESPONDENCE (OUTPATIENT)
Dept: HEALTH INFORMATION MANAGEMENT | Facility: CLINIC | Age: 45
End: 2020-03-04

## 2020-03-04 VITALS
RESPIRATION RATE: 18 BRPM | HEIGHT: 63 IN | WEIGHT: 293 LBS | BODY MASS INDEX: 51.91 KG/M2 | OXYGEN SATURATION: 100 % | TEMPERATURE: 98.8 F | HEART RATE: 104 BPM

## 2020-03-04 DIAGNOSIS — G35 MULTIPLE SCLEROSIS (H): Chronic | ICD-10-CM

## 2020-03-04 DIAGNOSIS — L03.119 CELLULITIS AND ABSCESS OF LEG: Primary | ICD-10-CM

## 2020-03-04 DIAGNOSIS — I10 BENIGN ESSENTIAL HYPERTENSION: Chronic | ICD-10-CM

## 2020-03-04 DIAGNOSIS — E66.01 MORBID OBESITY (H): Chronic | ICD-10-CM

## 2020-03-04 DIAGNOSIS — I89.0 LYMPHEDEMA OF BOTH LOWER EXTREMITIES: ICD-10-CM

## 2020-03-04 DIAGNOSIS — L02.419 CELLULITIS AND ABSCESS OF LEG: Primary | ICD-10-CM

## 2020-03-04 DIAGNOSIS — N39.42 URINARY INCONTINENCE WITHOUT SENSORY AWARENESS: ICD-10-CM

## 2020-03-04 PROBLEM — A41.9 SEPSIS (H): Status: RESOLVED | Noted: 2018-10-23 | Resolved: 2020-03-04

## 2020-03-04 PROBLEM — R65.20 SEVERE SEPSIS (H): Status: RESOLVED | Noted: 2020-02-24 | Resolved: 2020-03-04

## 2020-03-04 PROBLEM — A41.9 SEVERE SEPSIS (H): Status: RESOLVED | Noted: 2020-02-24 | Resolved: 2020-03-04

## 2020-03-04 PROCEDURE — 99495 TRANSJ CARE MGMT MOD F2F 14D: CPT | Performed by: FAMILY MEDICINE

## 2020-03-04 ASSESSMENT — PAIN SCALES - GENERAL: PAINLEVEL: SEVERE PAIN (6)

## 2020-03-04 ASSESSMENT — MIFFLIN-ST. JEOR: SCORE: 2387.69

## 2020-03-04 NOTE — PROGRESS NOTES
Subjective     Bess Enamorado is a 44 year old female who presents to clinic today for the following health issues:    HPI       Hospital Follow-up Visit:    Hospital/Nursing Home/IP Rehab Facility: [facility]  Date of Admission: 02/24/2020  Date of Discharge: 02/27/2020  Reason(s) for Admission: cellulitis, sepsis            Problems taking medications regularly:  None       Medication changes since discharge: antibiotic - last dose is today       Problems adhering to non-medication therapy:  None    Summary of hospitalization:  [facility] discharge summary reviewed  Diagnostic Tests/Treatments reviewed.  Follow up needed: none  Other Healthcare Providers Involved in Patient s Care:         Homecare  Update since discharge: improved.     Post Discharge Medication Reconciliation: discharge medications reconciled, continue medications without change.  Plan of care communicated with patient     Coding guidelines for this visit:  Type of Medical   Decision Making Face-to-Face Visit       within 7 Days of discharge Face-to-Face Visit        within 14 days of discharge   Moderate Complexity 06413 [id number]   High Complexity 16300 60240            Hypertension Follow-up      Do you check your blood pressure regularly outside of the clinic? Yes - homecare    Are you following a low salt diet? Yes    Are your blood pressures ever more than 140 on the top number (systolic) OR more   than 90 on the bottom number (diastolic), for example 140/90? No      Patient has been out of work since she went to the hospital on 2/24/2020.  She has a FMLA application with her today and she plans to pursue disability given her MS, morbid obesity, severe lymphedema, incontinence and OA of the left hip.    She is considering pursuing bariatric surgery and given her BMI of 69 and her comorbid conditions, I would fully support this.        Reviewed and updated as needed this visit by Provider         Review of Systems   ROS COMP:  "Constitutional, HEENT, cardiovascular, pulmonary, gi and gu systems are negative, except as otherwise noted.      Objective    Pulse 104   Temp 98.8  F (37.1  C) (Tympanic)   Resp 18   Ht 1.6 m (5' 3\")   Wt (!) 177.4 kg (391 lb)   LMP 02/29/2020   SpO2 100%   Breastfeeding No   BMI 69.26 kg/m    Body mass index is 69.26 kg/m .  Physical Exam   GENERAL: healthy, alert and no distress  NECK: no adenopathy, no asymmetry, masses, or scars and thyroid normal to palpation  RESP: lungs clear to auscultation - no rales, rhonchi or wheezes  CV: regular rate and rhythm, normal S1 S2, no S3 or S4, no murmur, click or rub, no peripheral edema and peripheral pulses strong  ABDOMEN: soft, nontender, no hepatosplenomegaly, no masses and bowel sounds normal  MS: no gross musculoskeletal defects noted, no edema  SKIN: venous stasis dermatitis, but no specific cellulitis.  She has severe lymphedema with elephantiasis type changes and nodular appearance.  There is some weeping.      Diagnostic Test Results:  Labs reviewed in Epic        Assessment & Plan       ICD-10-CM    1. Cellulitis and abscess of leg L03.119     L02.419    2. Lymphedema of both lower extremities I89.0    3. Multiple sclerosis (H) G35    4. Morbid obesity (H) E66.01    5. Benign essential hypertension I10    6. Urinary incontinence without sensory awareness N39.42    patient is to be out of work at this time, I don't forsee her, in her current condition to be able to return to work.  I would recommend she seek disability due to her MS, severe lymphedema and morbid obesity.      BMI:   Estimated body mass index is 69.26 kg/m  as calculated from the following:    Height as of this encounter: 1.6 m (5' 3\").    Weight as of this encounter: 177.4 kg (391 lb).   Weight management plan: Pt wants to wait to see bariatric clinic at this time but will be pursing this        Has follow up with me in 1 month.    No follow-ups on file.    MD JENNI Champion " [facility]

## 2020-03-04 NOTE — TELEPHONE ENCOUNTER
FMLA form completed for pt and emailed, per MD, to [email or web address] - Copy to scanning    You can access the FollowMyHealth Patient Portal offered by [facility] by registering at the following website: [email or web address]. By joining [employer]’s FollowMyHealth portal, you will also be able to view your health information using other applications (apps) compatible with our system.

## 2020-03-09 ENCOUNTER — TELEPHONE (OUTPATIENT)
Dept: CARE COORDINATION | Facility: CLINIC | Age: 45
End: 2020-03-09

## 2020-03-09 NOTE — TELEPHONE ENCOUNTER
[facility] now requests orders and shares plan of care/discharge summaries for some patients through [facility].  Please REPLY TO THIS MESSAGE OR ROUTE BACK TO THE AUTHOR in order to give authorization for orders when needed.  This is considered a verbal order, you will still receive a faxed copy of orders for signature.  Thank you for your assistance in improving collaboration for our patients.    ORDERS:     SW to eval need for assistance with community resources, grief/coping.      Adry Enamorado RN Case Manager  [facility]  [phone]  [email or web address]

## 2020-03-16 ENCOUNTER — PATIENT OUTREACH (OUTPATIENT)
Dept: CARE COORDINATION | Facility: CLINIC | Age: 45
End: 2020-03-16

## 2020-03-16 NOTE — PROGRESS NOTES
Clinic Care Coordination Contact    Follow Up Progress Note      Assessment: pt reports that she has reached out to her union for better understanding of options.  She has not started the process for SSDI.    Goals addressed this encounter:   Goals Addressed                 This Visit's Progress      Mental Health Management (pt-stated)   30%     Goal Statement: I want to improve my depression by getting on disability so I don't have to work.   Date Goal set: 2/28/2020  Barriers: MS, energy level, disability process/time line  Strengths: care coordination, resources  Date to Achieve By: 8/26/20  Patient expressed understanding of goal: yes  Action steps to achieve this goal:  1. I will look through the resources sent by  and decide on a route for applying (on own or with agency support)                 Intervention/Education provided during outreach: pt worked with homecare [employer] to get additional information.  She will be following up with her union and then proceeding with SSDI process based on information received.       She had no new concerns at this time.      Outreach Frequency: 2 weeks    Plan:   Pt to follow up with her union on what is next step and verify information she has been told by LTD insurance and employer.   Care Coordinator will follow up in two weeks.     KAY Cotton, [facility] - Care Coordinator   [facility]  3/16/2020   3:39 PM  [phone]

## 2020-03-21 ENCOUNTER — MYC MEDICAL ADVICE (OUTPATIENT)
Dept: FAMILY MEDICINE | Facility: CLINIC | Age: 45
End: 2020-03-21

## 2020-03-23 ENCOUNTER — VIRTUAL VISIT (OUTPATIENT)
Dept: FAMILY MEDICINE | Facility: CLINIC | Age: 45
End: 2020-03-23
Payer: COMMERCIAL

## 2020-03-23 DIAGNOSIS — F33.1 MODERATE EPISODE OF RECURRENT MAJOR DEPRESSIVE DISORDER (H): ICD-10-CM

## 2020-03-23 DIAGNOSIS — R60.0 LEG EDEMA: ICD-10-CM

## 2020-03-23 DIAGNOSIS — F41.1 GENERALIZED ANXIETY DISORDER: Primary | ICD-10-CM

## 2020-03-23 DIAGNOSIS — G35 MULTIPLE SCLEROSIS (H): Chronic | ICD-10-CM

## 2020-03-23 DIAGNOSIS — E66.01 MORBID OBESITY (H): Chronic | ICD-10-CM

## 2020-03-23 PROCEDURE — 99442 ZZC PHYSICIAN TELEPHONE EVALUATION 11-20 MIN: CPT | Performed by: FAMILY MEDICINE

## 2020-03-23 RX ORDER — SERTRALINE HYDROCHLORIDE 100 MG/1
100 TABLET, FILM COATED ORAL DAILY
Qty: 30 TABLET | Refills: 1 | Status: SHIPPED | OUTPATIENT
Start: 2020-03-23 | End: 2020-04-20

## 2020-03-23 ASSESSMENT — ANXIETY QUESTIONNAIRES
6. BECOMING EASILY ANNOYED OR IRRITABLE: NOT AT ALL
1. FEELING NERVOUS, ANXIOUS, OR ON EDGE: MORE THAN HALF THE DAYS
2. NOT BEING ABLE TO STOP OR CONTROL WORRYING: SEVERAL DAYS
5. BEING SO RESTLESS THAT IT IS HARD TO SIT STILL: NOT AT ALL
GAD7 TOTAL SCORE: 4
7. FEELING AFRAID AS IF SOMETHING AWFUL MIGHT HAPPEN: NOT AT ALL
3. WORRYING TOO MUCH ABOUT DIFFERENT THINGS: NOT AT ALL

## 2020-03-23 ASSESSMENT — PATIENT HEALTH QUESTIONNAIRE - PHQ9
5. POOR APPETITE OR OVEREATING: SEVERAL DAYS
SUM OF ALL RESPONSES TO PHQ QUESTIONS 1-9: 3

## 2020-03-23 NOTE — TELEPHONE ENCOUNTER
Phone visit  recommended. Left message for pt to call clinic to get put in schedule.         Loan Garcia MA

## 2020-03-23 NOTE — LETTER
[facility]  [address]  [facility] [address]  Phone: [phone]      REPORT OF WORK ABILITY    NOTE TO EMPLOYEE: You must promptly provide a copy of this report to your  employer or worker's compensation insurer, and Qualified Rehabilitation Consultant.    Date: 3/23/2020                     Employee Name: Bess Enamorado         YOB: 1975  Medical Record Number: [id number]   Soc.Sec.No: [id number]  Employer: None                Date of Injury: n/a  Managed Care Organization / Insurance Company Name: UNKNOWN    Diagnosis: moderate major depression, anxiety, secondary lymphedema, incontinence, multiple sclerosis    Work Related: no     MMI: NO   Permanent Partial Disability(PPD) likely: UNKNOWN    EMPLOYEE IS ABLE TO WORK: OFF Work until at least 6/23/2020.    Bess Enamorado is under my medical care.  She has a myriad of medical conditions as listed above.  She is neither physically nor mentally/emotionally able to work at this time.  Will re-evaluate the situation in 3 months. With her medical conditions, particularly the MS, this may be long term and may require prolonged absence or complete disability.       TREATMENT PLAN/NOTES:   Adjusting medications, seek counseling.      Mikala Luna MD

## 2020-03-23 NOTE — PATIENT INSTRUCTIONS
[facility] or [facility] (Wednesday)  Intake exam is 1 hour  Call for appointment  [phone]        Our Clinic hours are:  Mondays    7:20 am - 7 pm  Tues -  Fri  7:20 am - 5 pm    Clinic Phone: [phone]    The clinic lab opens at 7:30 am Mon - Fri and appointments are required.    [facility]. [phone]  Monday  8 am - 7pm  Tues - Fri 8 am - 5:30 pm

## 2020-03-23 NOTE — PROGRESS NOTES
"Bess Enamorado is a 44 year old female who is being evaluated via a billable telephone visit.      The patient has been notified of following:     \"This telephone visit will be conducted via a call between you and your physician/provider. We have found that certain health care needs can be provided without the need for a physical exam.  This service lets us provide the care you need with a short phone conversation.  If a prescription is necessary we can send it directly to your pharmacy.  If lab work is needed we can place an order for that and you can then stop by our lab to have the test done at a later time.    If during the course of the call the physician/provider feels a telephone visit is not appropriate, you will not be charged for this service.\"     Bess Enamorado complains of    Chief Complaint   Patient presents with     Patient Request for Note/Letter     Patient is hoping to present temporary suspension for health issues. 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 Patient was told to get a letter from primary doctor to help cover her absense. Patient physically and mentally is not able to do her job.      Hasn't yet turned in her FMLA - her union told her to hold onto it.     She's going to do a proposal for \"temporary medical suspension\".  Union person asked her if she had a note that she couldn't do \"long distance working\".  She would be expected to do lesson planning, seeing people over the internet (video conferencing).      Her anxiety level is so high when she has to interact with parents that her anxiety is so high she wants to vomit.      She spends much of her day with her feet up in the recliner.      She is on week 3 of lymphedema treatments, it's going well.    Physical therapy is on hold until she gets a new walker and wheelchair.      In Feb we started her on Sertraline 50 mg - not sure this is feeling any different yet.         I have reviewed and " updated the patient's Past Medical History, Social History, Family History and Medication List.    ALLERGIES  Nkda [no known drug allergies]      Additional provider notes:     Assessment/Plan:    ASSESSMENT/PLAN:      ICD-10-CM    1. Generalized anxiety disorder  F41.1 sertraline (ZOLOFT) 100 MG tablet   2. Moderate episode of recurrent major depressive disorder (H)  F33.1 sertraline (ZOLOFT) 100 MG tablet   3. Multiple sclerosis (H)  G35    4. Morbid obesity (H)  E66.01    5. Leg edema  R60.0        This patient was seen virtually during the COVID-19 outbreak in attempts to keep healthy patients out of the clinic per CDC guidelines (practicing social distancing).  I evaluated them by phone/evisit and this note reflects our visit.      Letter done for work.  With her multiple medical conditions as well as the increased anxiety and depression she's been experiencing, I do think it's reasonable for her to be off work at this time.  She is going to be increasing her sertraline and will seek some therapy (realizing that it likely will need to be phone visits at this time).        Phone call duration:  15 minutes    Mikala Luna M.D.    Patient Instructions   [facility] or [facility] (Wednesday)  Intake exam is 1 hour  Call for appointment  [phone]        Our Clinic hours are:  Mondays    7:20 am - 7 pm  Tues -  Fri  7:20 am - 5 pm    Clinic Phone: [phone]    The clinic lab opens at 7:30 am Mon - Fri and appointments are required.    [facility]. [phone]  Monday  8 am - 7pm  Tues - Fri 8 am - 5:30 pm

## 2020-03-24 ASSESSMENT — ANXIETY QUESTIONNAIRES: GAD7 TOTAL SCORE: 4

## 2020-03-25 ENCOUNTER — VIRTUAL VISIT (OUTPATIENT)
Dept: BEHAVIORAL HEALTH | Facility: CLINIC | Age: 45
End: 2020-03-25
Payer: COMMERCIAL

## 2020-03-25 DIAGNOSIS — F41.1 GENERALIZED ANXIETY DISORDER: ICD-10-CM

## 2020-03-25 DIAGNOSIS — F50.9 EATING DISORDER: Primary | ICD-10-CM

## 2020-03-25 PROCEDURE — 90832 PSYTX W PT 30 MINUTES: CPT | Mod: TEL | Performed by: SOCIAL WORKER

## 2020-03-25 NOTE — PROGRESS NOTES
"Bess Enamorado is a 44 year old female who is being evaluated via a telephone visit.      The patient has been notified of the following:     \"We have found that certain health care needs can be provided without the need for a face to face visit.  This service lets us provide the care you need with a short phone conversation.      I will have full access to your [place] medical record during this entire phone call.   I will be taking notes for your medical record.     Since this is like an office visit, we will bill your insurance company for this service.  Please check with your medical insurance if this type of telephone visit/virtual care is covered.  You may be responsible for the cost of this service if insurance coverage is denied.      There are potential benefits and risks of telephone visits (e.g. limits to patient confidentiality) that differ from in-person visits.?  Confidentiality still applies for telephone services, and nobody will record the visit.  It is important to be in a quiet, private space that is free of distractions (including cell phone or other devices) during the visit.??     If during the course of the call I believe a telephone visit is not appropriate, you will not be charged for this service\"    Consent has been obtained for this service by care team member: yes      March 25, 2020      Behavioral Health Clinician Progress Note    Patient Name: Bess Enamorado           Service Type: Phone Visit      Service Location:        Session Start Time: 3pm  Session End Time: 335 pm      Session Length: 16 - 37      Attendees: Patient    Visit Activities (Refresh list every visit): [facility] Only    Diagnostic Assessment Date: not completed  Treatment Plan Review Date: not completed  See Flowsheets for today's PHQ-9 and TALIB-7 results unable to complete due to COVID  Previous PHQ-9:   PHQ-9 SCORE 8/26/2019 2/13/2020 3/23/2020   PHQ-9 Total Score - - -   PHQ-9 Total Score 3 12 3     Previous " TALIB-7:   TALIB-7 SCORE 8/26/2019 2/13/2020 3/23/2020   Total Score - - -   Total Score 3 8 4       NING LEVEL:  NING Score (Last Two) 6/28/2011   NING Raw Score 42   Activation Score 66   NING Level 3       DATA  Extended Session (60+ minutes): No  Interactive Complexity: No  Crisis: No    Treatment Objective(s) Addressed in This Session:  Target Behavior(s): diet/weight loss and disease management/lifestyle changes increase healthy eating behaviors - increase coping behaviors and decrease depression and anxiety    Depressed Mood: Increase interest, engagement, and pleasure in doing things  Decrease frequency and intensity of feeling down, depressed, hopeless  Feel less tired and more energy during the day   Improve diet, appetite, mindful eating, and / or meal planning  Identify negative self-talk and behaviors: challenge core beliefs, myths, and actions  Anxiety: will experience a reduction in anxiety, will develop more effective coping skills to manage anxiety symptoms, will develop healthy cognitive patterns and beliefs and will increase ability to function adaptively  Functional Impairment: will effectively address problems that interfere with adaptive functioning  Psychological distress related to Pain  Psychological distress related to Chronic Disease Management    Current Stressors / Issues:  First session with patient. She is referred by PCP due to increased anxiety and depressed mood because medical issues and inability to work. Patient is a special  in school. She is now on medical leave through the end of the school year. Patient was see by [facility] therapist in [facility] in 2016.  She has also attended [facility] for  Binge eating. Patient reports no suicidal ideation or plan at this time.  [facility] - pt is in agreement. Discussed coping skills - to help get through present moment.   Did not complete DA. Will see patient in approx 1 week.       Progress on  Treatment Objective(s) / Homework:  none established    Motivational Interviewing    MI Intervention: Expressed Empathy/Understanding, Supported Autonomy, Collaboration, Evocation, Open-ended questions, Reflections: simple and complex, Change talk (evoked) and Reframe     Change Talk Expressed by the Patient: Desire to change Reasons to change Need to change Committment to change Activation    Provider Response to Change Talk: E - Evoked more info from patient about behavior change, A - Affirmed patient's thoughts, decisions, or attempts at behavior change, R - Reflected patient's change talk and S - Summarized patient's change talk statements      Care Plan review completed: No    Medication Review:  No changes to current psychiatric medication(s)    Medication Compliance:  Yes    Changes in Health Issues:   Yes: Pain, Associated Psychological Distress  Chronic disease management, Associated Psychological Distress  Weight / dietary issues, Associated Psychological Distress    Chemical Use Review:   Substance Use: Chemical use reviewed, no active concerns identified      Tobacco Use: No current tobacco use.      Assessment: Current Emotional / Mental Status (status of significant symptoms):  Risk status (Self / Other harm or suicidal ideation)  Patient denies a history of suicidal ideation, suicide attempts, self-injurious behavior, homicidal ideation, homicidal behavior and and other safety concerns  Patient denies current fears or concerns for personal safety.  Patient denies current or recent suicidal ideation or behaviors.  Patient denies current or recent homicidal ideation or behaviors.  Patient denies current or recent self injurious behavior or ideation.  Patient denies other safety concerns.  A safety and risk management plan has not been developed at this time, however patient was encouraged to call [facility] should there be a change in any of these risk factors.    Appearance:   NA   Eye  Contact:   NA   Psychomotor Behavior: NA   Attitude:   Cooperative   Orientation:   All  Speech   Rate / Production: Normal/ Responsive Normal    Volume:  Normal   Mood:    Anxious  Normal Sad   Affect:    NA   Thought Content:  Clear   Thought Form:  Coherent  Logical   Insight:    Good     Diagnoses:  1. Generalized anxiety disorder    2. Eating disorder        Collateral Reports Completed:  Communicated with: PCP    Plan: (Homework, other):  Patient was given information about behavioral services and encouraged to schedule a follow up appointment with the clinic [facility] in 1 week.  She was also given information about mental health symptoms and treatment options .  CD Recommendations: No indications of CD issues.  SHIRIN Mcgowan (Mindy),[facility]    .

## 2020-03-30 ENCOUNTER — PATIENT OUTREACH (OUTPATIENT)
Dept: CARE COORDINATION | Facility: CLINIC | Age: 45
End: 2020-03-30

## 2020-03-30 NOTE — PROGRESS NOTES
Clinic Care Coordination Contact    Follow Up Progress Note      Assessment: pt reports that she is meeting with her union and getting all figured  Out.  She will have insurance until February and will use up her vacation/sick time and then go onto LTD through work.     Goals addressed this encounter:   Goals Addressed                 This Visit's Progress      Mental Health Management (pt-stated)   50%     Goal Statement: I want to improve my depression by getting on disability so I don't have to work.   Date Goal set: 2/28/2020  Barriers: MS, energy level, disability process/time line  Strengths: care coordination, resources  Date to Achieve By: 8/26/20  Patient expressed understanding of goal: yes  Action steps to achieve this goal:  1. I will look through the resources sent by  and decide on a route for applying (on own or with agency support)                 Intervention/Education provided during outreach: pt to met with her union on Thursday to get all in order.  Once she is on LTD through work then she can get assistance for applying for SSDI.      Pt reports she is feeling comfortable at the moment with the plan.  She is also staying home and not going outside. At this time she did not have any new questions.  She will work with the LTD carrier on getting approved for SSDI.     Outreach Frequency: 2 weeks    Plan:   Pt to meet on Thursday like planned to discuss timelines with her union and get a plan in place.   Care Coordinator will follow up in about two weeks.     KAY Cotton, [facility] - Care Coordinator   [facility]  3/30/2020   2:11 PM  [phone]

## 2020-04-01 ENCOUNTER — VIRTUAL VISIT (OUTPATIENT)
Dept: BEHAVIORAL HEALTH | Facility: CLINIC | Age: 45
End: 2020-04-01
Payer: COMMERCIAL

## 2020-04-01 DIAGNOSIS — F33.1 MODERATE RECURRENT MAJOR DEPRESSION (H): Primary | ICD-10-CM

## 2020-04-01 DIAGNOSIS — F43.22 ADJUSTMENT DISORDER WITH ANXIOUS MOOD: ICD-10-CM

## 2020-04-01 PROCEDURE — 90791 PSYCH DIAGNOSTIC EVALUATION: CPT | Mod: TEL | Performed by: SOCIAL WORKER

## 2020-04-01 ASSESSMENT — COLUMBIA-SUICIDE SEVERITY RATING SCALE - C-SSRS
6. HAVE YOU EVER DONE ANYTHING, STARTED TO DO ANYTHING, OR PREPARED TO DO ANYTHING TO END YOUR LIFE?: NO
1. IN THE PAST MONTH, HAVE YOU WISHED YOU WERE DEAD OR WISHED YOU COULD GO TO SLEEP AND NOT WAKE UP?: NO
TOTAL  NUMBER OF INTERRUPTED ATTEMPTS PAST 3 MONTHS: NO
2. HAVE YOU ACTUALLY HAD ANY THOUGHTS OF KILLING YOURSELF LIFETIME?: NO
TOTAL  NUMBER OF ABORTED OR SELF INTERRUPTED ATTEMPTS PAST LIFETIME: NO
ATTEMPT LIFETIME: NO
6. HAVE YOU EVER DONE ANYTHING, STARTED TO DO ANYTHING, OR PREPARED TO DO ANYTHING TO END YOUR LIFE?: NO
TOTAL  NUMBER OF INTERRUPTED ATTEMPTS LIFETIME: NO
ATTEMPT PAST THREE MONTHS: NO
1. IN THE PAST MONTH, HAVE YOU WISHED YOU WERE DEAD OR WISHED YOU COULD GO TO SLEEP AND NOT WAKE UP?: NO
2. HAVE YOU ACTUALLY HAD ANY THOUGHTS OF KILLING YOURSELF?: NO
TOTAL  NUMBER OF ABORTED OR SELF INTERRUPTED ATTEMPTS PAST 3 MONTHS: NO

## 2020-04-01 NOTE — PROGRESS NOTES
"Bess Enamorado is a 44 year old female who is being evaluated via a telephone visit.      The patient has been notified of the following:     \"We have found that certain health care needs can be provided without the need for a face to face visit.  This service lets us provide the care you need with a short phone conversation.      I will have full access to your [facility] medical record during this entire phone call.   I will be taking notes for your medical record.     Since this is like an office visit, we will bill your insurance company for this service.  Please check with your medical insurance if this type of telephone visit/virtual care is covered.  You may be responsible for the cost of this service if insurance coverage is denied.      There are potential benefits and risks of telephone visits (e.g. limits to patient confidentiality) that differ from in-person visits.?  Confidentiality still applies for telephone services, and nobody will record the visit.  It is important to be in a quiet, private space that is free of distractions (including cell phone or other devices) during the visit.??     If during the course of the call I believe a telephone visit is not appropriate, you will not be charged for this service\"    Consent has been obtained for this service by care team member: yes.    [facility] Primary Care: : Integrated Behavioral Health  Integrated Behavioral Health Services   Diagnostic Assessment      PATIENT'S NAME: Bess Enamorado  MRN:   [id number]  :   1975  DATE OF SERVICE: 2020  SERVICE LOCATION: Phone call (patient / identified key support person reached)  Visit Activities: [facility] Only      Identifying Information:  Patient is a 44 year old year old, , single female.  Patient attended the session alone.        Referral:  Patient was referred for an assessment by primary care provider.   Reason for referral: clarify behavioral health diagnosis, determine " "behavioral health treatment options, assess client readiness and motivation to change, assess client social situation and address the interaction of behavioral and medical issues.       Patient's Statement of Presenting Concern:  Patient reports the following reason(s) for seeking an assessment at this time: anxiety and depression due to job loss.  Patient stated that her symptoms have resulted in the following functional impairments: chronic disease management, health maintenance, relationship(s), self-care and social interactions      History of Presenting Concern:  Patient reports that these problem(s) began Fall of 2019. 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 Patient has been off work since Feb 2020 because of increased health issues.  She is on leave through the end of the school year. At this time, she is struggling with the stressors of not ever returning to work and continued declining health.  She reports a history of binge eating. This has also led to increased medical complications. Patient has attempted to resolve these concerns in the past through: counseling, day treatment, medication(s) from physician / PCP and physician / PCP. Patient reports that other professional(s) are involved in providing support / services.       Social History:  Patient reported she grew up in [place], [place]. She is e the second born of three children.  Patient reported that her childhood was \"good\".  Patient reported a history of 3 committed relationships or marriages. Patient has been single for 6 years. Patient reported having 0 children. Patient identified some stable and meaningful social connections.     Patient lives alone.  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  Work history teaching - special ed    Patient reported that she has not been involved with the legal system.  Patient's highest education level was graduate school. Patient did not identify any learning problems. There are no ethnic, cultural or Latter-day factors that may be relevant for therapy.  Patient did not serve in the .       Mental Health History:  Patient reported the following biological family members or relatives with mental health issues: Father experienced Depression, Maternal Grandmother experienced Anxiety, Sister experienced Anxiety. Patient has received the following mental health services in the past: counseling, day treatment, physician / PCP and medication(s) from physician / PCP. Patient is currently receiving the following services: physician / PCP, medication(s) from physician / PCP and primary care behavioral health provider.      Chemical Health History:  Patient reported the following biological family members or relatives with chemical health issues: Maternal Grandfather reportedly used alcohol , Paternal Grandfather reportedly used alcohol . Patient has not received chemical dependency treatment in the past. Patient is not currently receiving any chemical dependency treatment. Patient reports no problems as a result of their drinking / drug use.      Cage-AID score is: Negative.  Based on Cage-Aid score and clinical interview there  are not indications of drug or alcohol abuse.      Discussed the general effects of drugs and alcohol on health and well-being.      Significant Losses / Trauma / Abuse / Neglect Issues:  There are indications or report of significant loss, trauma, abuse or neglect issues related to: death of grandmother, job loss due to health issues and major medical problems declining health due to MS  was diagnosed at age 29, this has affected relationships and ability to obtain and maintain relationships .    Issues of possible neglect are  not present.      Medical History:   Patient Active Problem List   Diagnosis     Multiple sclerosis (H)     Polycystic ovaries     Constipation     CARDIOVASCULAR SCREENING; LDL GOAL LESS THAN 160     Anxiety     Restless legs     Leg edema     Dyspnea and respiratory abnormality     Eating disorder     Papanicolaou smear of cervix with low grade squamous intraepithelial lesion (LGSIL)     Morbid obesity (H)     Peroneal tendon rupture     Benign essential hypertension     NARCISA (obstructive sleep apnea)     Moderate episode of recurrent major depressive disorder (H)     Cellulitis of abdominal wall     History of abnormal cervical Pap smear     Cellulitis     Generalized anxiety disorder       Medication Review:  Current Outpatient Medications   Medication     acetaminophen (TYLENOL) 500 MG tablet     Cholecalciferol (VITAMIN D3 PO)     desogestrel-ethinyl estradiol (APRI) 0.15-30 MG-MCG tablet     lisinopril (PRINIVIL/ZESTRIL) 20 MG tablet     metoprolol succinate ER (TOPROL-XL) 25 MG 24 hr tablet     Ocrelizumab (OCREVUS IV)     order for DME     sertraline (ZOLOFT) 100 MG tablet     No current facility-administered medications for this visit.        Patient was provided recommendation to follow-up with physician.    Mental Status Assessment:  Appearance:   NA   Eye Contact:   NA   Psychomotor Behavior: NA   Attitude:   Cooperative   Orientation:   All  Speech   Rate / Production: Normal    Volume:  Normal   Mood:    Anxious  Normal Sad   Affect:    NA   Thought Content:  Clear   Thought Form:  Coherent  Logical   Insight:    Good       Safety Assessment:    Patient denies a history of suicidal ideation, suicide attempts, self-injurious behavior, homicidal ideation, homicidal behavior and and other safety concerns  Patient denies current or recent suicidal ideation or behaviors.  Patient denies current or recent homicidal ideation or behaviors.  Patient denies current or recent self injurious behavior or  ideation.  Patient denies other safety concerns.  Patient reports there are no firearms in the house  Protective Factors Sense of responsibility to family, Life Satisfaction, Reality testing ability, Positive coping skills, Positive problem-solving skills, Positive social support and Positive therapeutic releationships   Risk Factors Isolation      Plan for Safety and Risk Management:  A safety and risk management plan has not been developed at this time, however patient was encouraged to call [place] [phone] should there be a change in any of these risk factors.      Review of Symptoms:  Depression: Interest Guilt Appetite Ruminations  Tiarra:  No symptoms  Psychosis: No symptoms  Anxiety: Worries Nervousness unable to stop or control worry thoughts  Panic:  No symptoms  Post Traumatic Stress Disorder: No symptoms  Obsessive Compulsive Disorder: No symptoms  Eating Disorder: Bingeing  Oppositional Defiant Disorder: No symptoms  ADD / ADHD: No symptoms  Conduct Disorder: No symptoms    Patient's Strengths and Limitations:  Patient identified the following strengths or resources that will help her succeed in counseling: commitment to health and well being, family support, insight, intelligence, motivation and sense of humor. Patient identified the following supports: family and friends. Things that may interfere with the patien'ts success in behavioral health services include:increased medical issues.    Diagnostic Criteria:  CRITERIA (A-C) REPRESENT A MAJOR DEPRESSIVE EPISODE - SELECT THESE CRITERIA   - Depressed mood. Note: In children and adolescents, can be irritable mood.     - Diminished interest or pleasure in all, or almost all, activities.    - Significant weight gainincrease in appetite.    - Fatigue or loss of energy.    - Feelings of worthlessness or inappropriate and excessive guilt.    - Diminished ability to think or concentrate, or indecisiveness.   B) The symptoms cause clinically significant  distress or impairment in social, occupational, or other important areas of functioning  C) The episode is not attributable to the physiological effects of a substance or to another medical condition  D) The occurence of major depressive episode is not better explained by other thought / psychotic disorders  E) There has never been a manic episode or hypomanic episode  A. The development of emotional or behavioral symptoms in response to an identifiable stressor(s) occurring within 3 months of the onset of the stressor(s)  B. These symptoms or behaviors are clinically significant, as evidenced by one or both of the following:       - Significant impairment in social, occupational, or other important areas of functioning  C. The stress-related disturbance does not meet criteria for another disorder & is not not an exacerbation of another mental disorder  D. The symptoms do not represent normal bereavement  E. Once the stressor or its consequences have terminated, the symptoms do not persist for more than an additional 6 months       * Adjustment Disorder with Anxiety: The predominant manfestations are symptoms such as nervousness, worry, or jitteriness, or, in children separation anxiety from major attachment figures      Functional Status:  Patient's symptoms have caused and are causing reduced functional status in the following areas: Follow through with Medical recommendations - regarding diet/weight loss  Social / Relational - increased isolation      DSM5 Diagnoses: (Sustained by DSM5 Criteria Listed Above)  Diagnoses: 296.32 (F33.1) Major Depressive Disorder, Recurrent Episode, Moderate _ and With mixed features  Adjustment Disorders  309.24 (F43.22) With anxiety  History of Binge Eating Disorder  Psychosocial & Contextual Factors: declining health due to MS  WHODAS Score: did not complete - phone visit  See Media section of EPIC medical record for completed WHODAS    Preliminary Treatment Plan:    The client  reports no currently identified Advent, ethnic or cultural issues relevant to therapy.    Initial Treatment will focus on: Depressed Mood - decrease  Anxiety - decrease  Adjustment Difficulties related to: health issues  Functional Impairment at: home  Grief / Loss - many losses due to health diagnoses   decrease binge eating.    Chemical dependency recommendations: No indications of CD issues    As a preliminary treatment goal, patient will experience a reduction in depressed mood, will develop more effective coping skills to manage depressive symptoms, will develop healthy cognitive patterns and beliefs, will increase ability to function adaptively and will continue to take medications as prescribed / participate in supportive activities and services , will experience a reduction in anxiety, will develop more effective coping skills to manage anxiety symptoms, will develop healthy cognitive patterns and beliefs and will increase ability to function adaptively, will develop coping/problem-solving skills to facilitate more adaptive adjustment, will effectively address problems that interfere with adaptive functioning, will increase understanding of normal grieving process, will engage in effective approach to address and resolve grief/loss issues and will process grief/loss issues in an adaptive manner and increase healthy eating behaviors.    The focus of initial interventions will be to alleviate anxiety, alleviate depressed mood, alleviate lability of mood, facilitate appropriate expression of feelings, increase ability to function adaptively, increase coping skills, increase self esteem, process losses, provide homework to reinforce skill development, teach communication skills, teach conflict management skills, teach distress tolerance skills, teach emotional regulation and teach stress mangement techniques.    The patient is receiving treatment / structured support from the following professional(s) /  service and treatment. Collaboration will be initiated with: primary care physician.    The following referral(s) will be initiated: [facility] therapist Becky Mendenhall.    A Release of Information is not needed at this time.    Report to child or adult protection services was DEYANIRA Dominguez, [facility], Behavioral Health Clinician

## 2020-04-07 ENCOUNTER — VIRTUAL VISIT (OUTPATIENT)
Dept: PSYCHOLOGY | Facility: CLINIC | Age: 45
End: 2020-04-07
Payer: COMMERCIAL

## 2020-04-07 DIAGNOSIS — F33.1 MAJOR DEPRESSIVE DISORDER, RECURRENT EPISODE, MODERATE (H): Primary | ICD-10-CM

## 2020-04-07 PROCEDURE — 90834 PSYTX W PT 45 MINUTES: CPT | Mod: TEL | Performed by: SOCIAL WORKER

## 2020-04-07 NOTE — PROGRESS NOTES
"                                           Progress Note    Patient Name: Bess Enamorado  Date: 4/7/2020         Service Type: Phone Visit      Session Start Time: 9:30 AM  Session End Time: 10:20 AM     Session Length: 50 Minutes    Session #: 1    Attendees: Client     The client has been notified of the following:    \"We have found that certain health care needs can be provided without the need for a face to face visit. This service lets us provided the care you need with a phone conversation. I will have full access to your [facility] medical record during this entire phone call. I will be taking notes for your medical record. Since this is like an office visit, we will bill your insurance company for this service. There are potential benefits and risks of telephone visits (e.g. limits to patient confidentiality) that differ from in-person visits. Confidentiality still applies for telephone services, and nobody will record the visit. It is important to be in a quiet, private space that is free from distractions (including cell phone or other devices) during the visit. If during the course of the call, I believe a telephone visit is not appropriate, you will not be charged for this service\".    Consent has been obtained for this service by the provider: Yes    Treatment Plan Last Reviewed: N/A  PHQ-9 / TALIB-7 : N/A    DATA  Interactive Complexity: No  Crisis: No       Progress Since Last Session (Related to Symptoms / Goals / Homework):   Symptoms: No change : The client reported no change in her symptoms since her previous phone visit with ROSMERY Webb, YEYO.I.[email or web address].WSohan on 4/1/2020.    Homework: N/A      Episode of Care Goals: Satisfactory progress - PREPARATION (Decided to change - considering how); Intervened by negotiating a change plan and determining options / strategies for behavior change, identifying triggers, exploring social supports, and working towards setting a date to begin behavior " "change.     Current / Ongoing Stressors and Concerns:   The client reported that her most significant stressors are managing the symptoms of mental illness and physical illness she has been experiencing.     Treatment Objective(s) Addressed in This Session:   Obtained additional information about the client's history and recent stressors. Built rapport.     Intervention:   N/A      ASSESSMENT: Current Emotional / Mental Status (status of significant symptoms):   Risk status (Self / Other harm or suicidal ideation)   Client denies current fears or concerns for personal safety.   Client denies current or recent suicidal ideation or behaviors.   Client denies current or recent homicidal ideation or behaviors.   Client denies current or recent self injurious behavior or ideation.   Client denies other safety concerns.   Client reports there has been no change in risk factors since her last session.     Client reports there has been no change in protective factors since her last session.     Recommended that the client call 911 or go to the local emergency department should there be a change in any of these risk factors.     Attitude:   Cooperative    Orientation:   All   Speech    Rate / Production: Normal/ Responsive    Volume:  Normal    Mood:    Depressed    Thought Content:  Clear    Thought Form:  Coherent  Logical    Insight:    Good      Medication Review:   No changes to current psychiatric medication(s).     Medication Compliance:   Yes     Changes in Health Issues:   None reported.     Chemical Use Review:   Substance Use: Chemical use reviewed. No active concerns identified.      Tobacco Use: No current tobacco use.      Diagnosis:  1. Major depressive disorder, recurrent episode, moderate (H)        Collateral Reports Completed:   Not Applicable    PLAN: (Patient Tasks / Therapist Tasks / Other)  Read \"Full Catastrophe Living\" by Ricco Skaggs.        I have reviewed the note as documented above. This " accurately captures the substance of my conversation with the client.  Becky Mendenhall M.S.ONEYDA., [email or web address].

## 2020-04-13 ENCOUNTER — PATIENT OUTREACH (OUTPATIENT)
Dept: CARE COORDINATION | Facility: CLINIC | Age: 45
End: 2020-04-13

## 2020-04-13 NOTE — PROGRESS NOTES
Clinic Care Coordination Contact  [facility]/Voicemail       Clinical Data: Care Coordinator Outreach  Outreach attempted x 1.  Left message on patient's voicemail with call back information and requested return call.  Plan:  Care Coordinator will try to reach patient again in 5-7 business days.    KAY Cotton, [facility] - Care Coordinator   [facility]  4/13/2020   1:27 PM  [phone]

## 2020-04-13 NOTE — LETTER
[facility]  [address] [place] [address]  [phone]    4/20/2020      Bess Enamorado  [address] [facility] [address]      Dear  Bess,      I have been attempting to reach you since our last contact. I would like to continue to work with you on the goals from our last conversation and provide the support you may need. I would appreciate if you would give me a call at [phone] and let me know if you would like to continue working together. I know that there are many things that can affect our ability to communicate and hope we can plan better moving forward.     All of us at [facility] are invested in your health and are here to assist you in meeting your goals.     Sincerely,      KAY Cotton  [facility] - Care Coordination  [facility]   [phone]

## 2020-04-17 ENCOUNTER — TELEPHONE (OUTPATIENT)
Dept: FAMILY MEDICINE | Facility: CLINIC | Age: 45
End: 2020-04-17

## 2020-04-17 NOTE — TELEPHONE ENCOUNTER
[facility] now requests orders and shares plan of care/discharge summaries for some patients through [employer].  Please REPLY TO THIS MESSAGE OR ROUTE BACK TO THE AUTHOR in order to give authorization for orders when needed.  This is considered a verbal order, you will still receive a faxed copy of orders for signature.  Thank you for your assistance in improving collaboration for our patients.    ORDER:   OT lymphedema 5w1, 2w1 to treat bilateral LE lymphedema with increased frequency for goals and  recertification for continued CDT therapy.    Majo Marinelli [email or web address]  [phone]

## 2020-04-20 ENCOUNTER — VIRTUAL VISIT (OUTPATIENT)
Dept: FAMILY MEDICINE | Facility: CLINIC | Age: 45
End: 2020-04-20
Payer: COMMERCIAL

## 2020-04-20 DIAGNOSIS — F41.1 GENERALIZED ANXIETY DISORDER: ICD-10-CM

## 2020-04-20 DIAGNOSIS — G35 MULTIPLE SCLEROSIS (H): Primary | Chronic | ICD-10-CM

## 2020-04-20 DIAGNOSIS — F33.1 MODERATE EPISODE OF RECURRENT MAJOR DEPRESSIVE DISORDER (H): ICD-10-CM

## 2020-04-20 PROCEDURE — 96127 BRIEF EMOTIONAL/BEHAV ASSMT: CPT | Performed by: FAMILY MEDICINE

## 2020-04-20 PROCEDURE — 99214 OFFICE O/P EST MOD 30 MIN: CPT | Mod: TEL | Performed by: FAMILY MEDICINE

## 2020-04-20 RX ORDER — SERTRALINE HYDROCHLORIDE 100 MG/1
100 TABLET, FILM COATED ORAL DAILY
Qty: 90 TABLET | Refills: 3 | Status: SHIPPED | OUTPATIENT
Start: 2020-04-20 | End: 2021-04-15

## 2020-04-20 ASSESSMENT — ANXIETY QUESTIONNAIRES
IF YOU CHECKED OFF ANY PROBLEMS ON THIS QUESTIONNAIRE, HOW DIFFICULT HAVE THESE PROBLEMS MADE IT FOR YOU TO DO YOUR WORK, TAKE CARE OF THINGS AT HOME, OR GET ALONG WITH OTHER PEOPLE: SOMEWHAT DIFFICULT
5. BEING SO RESTLESS THAT IT IS HARD TO SIT STILL: NOT AT ALL
1. FEELING NERVOUS, ANXIOUS, OR ON EDGE: SEVERAL DAYS
6. BECOMING EASILY ANNOYED OR IRRITABLE: NOT AT ALL
7. FEELING AFRAID AS IF SOMETHING AWFUL MIGHT HAPPEN: NOT AT ALL
GAD7 TOTAL SCORE: 2
3. WORRYING TOO MUCH ABOUT DIFFERENT THINGS: NOT AT ALL
2. NOT BEING ABLE TO STOP OR CONTROL WORRYING: SEVERAL DAYS

## 2020-04-20 ASSESSMENT — PATIENT HEALTH QUESTIONNAIRE - PHQ9
SUM OF ALL RESPONSES TO PHQ QUESTIONS 1-9: 6
5. POOR APPETITE OR OVEREATING: NOT AT ALL

## 2020-04-20 NOTE — PROGRESS NOTES
Clinic Care Coordination Contact  [facility]/Voicemail       Clinical Data: Care Coordinator Outreach  Outreach attempted x 2.  Left message on patient's voicemail with call back information and requested return call.  Plan: Care Coordinator will send unable to contact letter with care coordinator contact information via [employer]. Care Coordinator will try to reach patient again in 1 month.    KAY Cotton, [facility] - Care Coordinator   [facility]  4/20/2020   9:01 AM  [phone]

## 2020-04-20 NOTE — PROGRESS NOTES
"Bess Enamorado is a 44 year old female who is being evaluated via a billable telephone visit.      The patient has been notified of following:     \"This telephone visit will be conducted via a call between you and your physician/provider. We have found that certain health care needs can be provided without the need for a physical exam.  This service lets us provide the care you need with a short phone conversation.  If a prescription is necessary we can send it directly to your pharmacy.  If lab work is needed we can place an order for that and you can then stop by our lab to have the test done at a later time.    Telephone visits are billed at different rates depending on your insurance coverage. During this emergency period, for some insurers they may be billed the same as an in-person visit.  Please reach out to your insurance provider with any questions.    If during the course of the call the physician/provider feels a telephone visit is not appropriate, you will not be charged for this service.\"    Patient has given verbal consent for Telephone visit?  Yes    How would you like to obtain your AVS? MyChart    Subjective     Bess Enamorado is a 44 year old female who presents to clinic today for the following health issues:    Chief Complaint   Patient presents with     Depression     Patient Request     needs a order and referral for new wheel chair.     Orders     Patient wondering if you completed lymphedema order to have pressure wraps increased.          Depression Followup    How are you doing with your depression since your last visit? Improved since not having to be at work    Are you having other symptoms that might be associated with depression? No    Have you had a significant life event?  No     Are you feeling anxious or having panic attacks?   Yes:  not able to shut her thoughts off.     Do you have any concerns with your use of alcohol or other drugs? No     On leave officially from school, will be " completing long term disability paperwork. Will have a  for that.  Will be using her sick leave until January.      Started therapy over the phone.  Was seeing Bee and then switched to Danna. Will be seen weekly.      No side effects with the sertraline at all.      Social History     Tobacco Use     Smoking status: Never Smoker     Smokeless tobacco: Never Used   Substance Use Topics     Alcohol use: Yes     Comment: social     Drug use: No     PHQ 8/26/2019 2/13/2020 3/23/2020   PHQ-9 Total Score 3 12 3   Q9: Thoughts of better off dead/self-harm past 2 weeks Not at all Not at all Not at all     TALIB-7 SCORE 8/26/2019 2/13/2020 3/23/2020   Total Score - - -   Total Score 3 8 4     Last PHQ-9 3/23/2020   1.  Little interest or pleasure in doing things 1   2.  Feeling down, depressed, or hopeless 1   3.  Trouble falling or staying asleep, or sleeping too much 0   4.  Feeling tired or having little energy 0   5.  Poor appetite or overeating 1   6.  Feeling bad about yourself 0   7.  Trouble concentrating 0   8.  Moving slowly or restless 0   Q9: Thoughts of better off dead/self-harm past 2 weeks 0   PHQ-9 Total Score 3   Difficulty at work, home, or with people Somewhat difficult     TALIB-7  3/23/2020   1. Feeling nervous, anxious, or on edge 2   2. Not being able to stop or control worrying 1   3. Worrying too much about different things 0   4. Trouble relaxing 1   5. Being so restless that it is hard to sit still 0   6. Becoming easily annoyed or irritable 0   7. Feeling afraid, as if something awful might happen 0   TALIB-7 Total Score 4   If you checked any problems, how difficult have they made it for you to do your work, take care of things at home, or get along with other people? -     In the past two weeks have you had thoughts of suicide or self-harm?  No.    Do you have concerns about your personal safety or the safety of others?   No    Suicide Assessment Five-step Evaluation and Treatment  (SAFE-T)      How many servings of fruits and vegetables do you eat daily?  0-1    On average, how many sweetened beverages do you drink each day (Examples: soda, juice, sweet tea, etc.  Do NOT count diet or artificially sweetened beverages)?   1-2    How many days per week do you exercise enough to make your heart beat faster? 3 or less    How many minutes a day do you exercise enough to make your heart beat faster? 9 or less  How many days per week do you miss taking your medication? 1    What makes it hard for you to take your medications?  remembering to take       Wanted to make sure I got the orders from OT to increase her lymphedema therapy.  It's going well.  L>R but the size has improved significantly and no longer has weeping.   Has lymphedema therapy 3x/week - but they are going to be coming daily for a while to help getting things improved further.          Reviewed and updated as needed this visit by Provider         Review of Systems   ROS COMP: Constitutional, HEENT, cardiovascular, pulmonary, gi and gu systems are negative, except as otherwise noted.       Objective   Reported vitals:  There were no vitals taken for this visit.   healthy, alert and no distress  PSYCH: Alert and oriented times 3; coherent speech, normal   rate and volume, able to articulate logical thoughts, able   to abstract reason, no tangential thoughts, no hallucinations   or delusions  Her affect is pleasant and anxious  RESP: No cough, no audible wheezing, able to talk in full sentences  Remainder of exam unable to be completed due to telephone visits    Diagnostic Test Results:  Labs reviewed in Epic        Assessment/Plan:  1. Moderate episode of recurrent major depressive disorder (H)   stable, working with therapist and will be seen weekly for that  - sertraline (ZOLOFT) 100 MG tablet; Take 1 tablet (100 mg) by mouth daily  Dispense: 90 tablet; Refill: 3    2. Generalized anxiety disorder   stable  - sertraline (ZOLOFT) 100  MG tablet; Take 1 tablet (100 mg) by mouth daily  Dispense: 90 tablet; Refill: 3    3. Multiple sclerosis (H)  Needs a new wheelchair that is properly fitted for her.  Purchased a w/c off [employer] and it's not the right size for her height/weight.  Has a difficult time with this.  Symptoms of her MS are only anticipated to get worse, it would be beneficial for her to be properly fitted for an appropriate wheelchair.  Manual chair should be adequate at this time unless PT/OT feels differently.  - PHYSICAL THERAPY REFERRAL; Future    No follow-ups on file.     This patient was seen virtually during the COVID-19 outbreak in attempts to keep healthy patients out of the clinic per CDC guidelines (practicing social distancing).  I evaluated them by phone/evisit and the note reflects our conversation/visit.          Phone call duration:  9 minutes    Mikala Luna MD

## 2020-04-20 NOTE — PATIENT INSTRUCTIONS
Our Clinic hours are:  Mondays    7:20 am - 7 pm  Tues -  Fri  7:20 am - 5 pm    Clinic Phone: [phone]    The clinic lab opens at 7:30 am Mon - Fri and appointments are required.    [facility]. [phone]  Monday  8 am - 7pm  Tues - Fri 8 am - 5:30 pm

## 2020-04-21 ASSESSMENT — ANXIETY QUESTIONNAIRES: GAD7 TOTAL SCORE: 2

## 2020-04-24 ENCOUNTER — TELEPHONE (OUTPATIENT)
Dept: FAMILY MEDICINE | Facility: CLINIC | Age: 45
End: 2020-04-24

## 2020-04-24 NOTE — TELEPHONE ENCOUNTER
[facility] now requests orders and shares plan of care/discharge summaries for some patients through [employer].  Please REPLY TO THIS MESSAGE OR ROUTE BACK TO THE AUTHOR in order to give authorization for orders when needed.  This is considered a verbal order, you will still receive a faxed copy of orders for signature.  Thank you for your assistance in improving collaboration for our patients.    ORDER:  OT lymphedema 3w1, 5w3 to treat lower extremity and truncal lymphedema with CDT treatment to include skin care, graduated compression bandaging, manual lymph drainage, HEP, compression garments, patient education for long-term management.    Majo Marinelli, JIMENA, IVELISSET  [email or web address]  [phone]

## 2020-04-30 ENCOUNTER — VIRTUAL VISIT (OUTPATIENT)
Dept: PSYCHOLOGY | Facility: CLINIC | Age: 45
End: 2020-04-30
Payer: COMMERCIAL

## 2020-04-30 DIAGNOSIS — F41.1 GENERALIZED ANXIETY DISORDER: Primary | ICD-10-CM

## 2020-04-30 DIAGNOSIS — F33.1 MODERATE EPISODE OF RECURRENT MAJOR DEPRESSIVE DISORDER (H): ICD-10-CM

## 2020-04-30 PROCEDURE — 90834 PSYTX W PT 45 MINUTES: CPT | Mod: TEL | Performed by: SOCIAL WORKER

## 2020-05-02 NOTE — PROGRESS NOTES
"                                           Progress Note    Patient Name: Bess Enamorado  Date: 4/30/2020         Service Type: Phone Visit (The [employer] video visit system was not working so the client could not participate in a video visit.)      Session Start Time: 2:45 PM  Session End Time: 3:25 PM     Session Length: 40 Minutes    Session #: 2    Attendees: Client     The client has been notified of the following:     \"We have found that certain health care needs can be provided without the need for a face to face visit. This service lets us provided the care you need with a phone conversation. I will have full access to your [facility] medical record during this entire phone call. I will be taking notes for your medical record. Since this is like an office visit, we will bill your insurance company for this service. There are potential benefits and risks of telephone visits (e.g. limits to patient confidentiality) that differ from in-person visits. Confidentiality still applies for telephone services, and nobody will record the visit. It is important to be in a quiet, private space that is free from distractions (including cell phone or other devices) during the visit. If during the course of the call, I believe a telephone visit is not appropriate, you will not be charged for this service\".     Consent has been obtained for this service by the provider: Yes     Treatment Plan Last Reviewed: N/A  PHQ-9 / TALIB-7 : N/A    DATA  Interactive Complexity: No  Crisis: No       Progress Since Last Session (Related to Symptoms / Goals / Homework):   Symptoms: No change : The client reported no change in her symptoms since the previous phone visit.    Homework: Partially completed. The client reported that she did purchase and begin reading \"Full Catastrophe Living\" by Ricco Skaggs.      Episode of Care Goals: No improvement - ACTION (Actively working towards change); Intervened by reinforcing change plan / affirming " steps taken     Current / Ongoing Stressors and Concerns:   The client reported that her most significant stressors are managing the symptoms of mental illness and physical illness she has been experiencing.     Treatment Objective(s) Addressed in This Session:   The client completed the treatment plan during the phone visit.     Intervention:   N/A        ASSESSMENT: Current Emotional / Mental Status (status of significant symptoms):   Risk status (Self / Other harm or suicidal ideation)   Client denies current fears or concerns for personal safety.   Client denies current or recent suicidal ideation or behaviors.   Client denies current or recent homicidal ideation or behaviors.   Client denies current or recent self injurious behavior or ideation.   Client denies other safety concerns.   Client reports there has been no change in risk factors since her last session.     Client reports there has been no change in protective factors since her last session.     Recommended that the client call 911 or go to the local emergency department should there be a change in any of these risk factors.     Attitude:   Cooperative    Orientation:   All   Speech    Rate / Production: Normal/ Responsive    Volume:  Normal    Mood:    Depressed    Thought Content:  Clear    Thought Form:  Coherent  Logical    Insight:    Good      Medication Review:   No changes to current psychiatric medication(s).     Medication Compliance:   Yes     Changes in Health Issues:   None reported.     Chemical Use Review:   Substance Use: Chemical use reviewed. No active concerns identified.      Tobacco Use: No current tobacco use.      Diagnosis:  1. Generalized anxiety disorder    2. Moderate episode of recurrent major depressive disorder (H)        Collateral Reports Completed:   Not Applicable    PLAN: (Patient Tasks / Therapist Tasks / Other)  Complete the first section of her disability paperwork.        I have reviewed the note as documented  above. This accurately captures the substance of my conversation with the client.  CHARLES Santamaria., BOB.                                                         ______________________________________________________________________    Treatment Plan    Patient's Name: Bess Enamorado  YOB: 1975    Date: 4/30/2020    DSM5 Diagnoses:     296.32 (F33.1) Major Depressive Disorder, Recurrent Episode, Moderate  300.02 (F41.1) Generalized Anxiety Disorder    Psychosocial / Contextual Factors: The client is a [age] year old  single female who resides in a home in [place], [place]. The client is currently on a medical leave of absence from her job as a . The client has multiple physical health issues. In addition, she struggles to manage symptoms of major depressive disorder and generalized anxiety disorder.    WHODAS: Not Completed    Referral / Collaboration:  Referral to another professional/service is not indicated at this time.    Anticipated number of session or this episode of care: 12    MeasurableTreatment Goal(s) related to diagnosis / functional impairment(s)  Goal 1: The client will learn and apply skills to manage the symptoms of mental illness she has been experiencing.    I will know I've met my goal when I have less anxiety, more energy and more motivation.      Objective #A    The client will learn and apply mindfulness skills.  Status: New - Date: 4/30/2020     Intervention(s)  Therapist will teach and  the client in learning and applying mindfulness skills.    Objective #B  The client will learn and apply distress tolerance skills.  Status: New - Date: 4/30/2020     Intervention(s)  Therapist will teach and  the client in learning and applying distress tolerance skills.    Objective #C  The client will learn and apply emotion regulation skills.  Status: New - Date: 4/30/2020     Intervention(s)  Therapist will teach and  the  client in learning and applying emotion regulation skills.    Objective #D  The client will learn and apply interpersonal effectiveness skills.    Status: New - Date: 4/30/2020     Intervention(s)  Therapist will teach and  the client in learning and applying interpersonal effectiveness skills.       The client has reviewed and verbally agreed to the above plan.      TALHA [email or web address]., [email or web address].  May 1, 2020

## 2020-05-06 ENCOUNTER — VIRTUAL VISIT (OUTPATIENT)
Dept: PSYCHOLOGY | Facility: CLINIC | Age: 45
End: 2020-05-06
Payer: COMMERCIAL

## 2020-05-06 DIAGNOSIS — F41.1 GENERALIZED ANXIETY DISORDER: Primary | ICD-10-CM

## 2020-05-06 DIAGNOSIS — F33.1 MODERATE EPISODE OF RECURRENT MAJOR DEPRESSIVE DISORDER (H): ICD-10-CM

## 2020-05-06 PROCEDURE — 90834 PSYTX W PT 45 MINUTES: CPT | Mod: TEL | Performed by: SOCIAL WORKER

## 2020-05-07 ENCOUNTER — TELEPHONE (OUTPATIENT)
Dept: FAMILY MEDICINE | Facility: CLINIC | Age: 45
End: 2020-05-07

## 2020-05-07 NOTE — TELEPHONE ENCOUNTER
[facility] now requests orders and shares plan of care/discharge summaries for some patients through [employer].  Please REPLY TO THIS MESSAGE OR ROUTE BACK TO THE AUTHOR in order to give authorization for orders when needed.  This is considered a verbal order, you will still receive a faxed copy of orders for signature.  Thank you for your assistance in improving collaboration for our patients.    ORDER    Home Occupational Therapy for equipment, ADLs, IADLs, energy conservation and home safety.     1w3

## 2020-05-09 NOTE — PROGRESS NOTES
"                                           Progress Note    Patient Name: Bess Enamorado  Date: 5/6/2020         Service Type: Phone Visit (The [employer] video visit system was not working so the client could not participate in a video visit.)      Session Start Time: 2:30 PM  Session End Time: 3:15 PM     Session Length: 45 Minutes    Session #: 3    Attendees: Client     The client has been notified of the following:     \"We have found that certain health care needs can be provided without the need for a face to face visit. This service lets us provided the care you need with a phone conversation. I will have full access to your [facility] medical record during this entire phone call. I will be taking notes for your medical record. Since this is like an office visit, we will bill your insurance company for this service. There are potential benefits and risks of telephone visits (e.g. limits to patient confidentiality) that differ from in-person visits. Confidentiality still applies for telephone services, and nobody will record the visit. It is important to be in a quiet, private space that is free from distractions (including cell phone or other devices) during the visit. If during the course of the call, I believe a telephone visit is not appropriate, you will not be charged for this service\".     Consent has been obtained for this service by the provider: Yes     Treatment Plan Last Reviewed: 4/30/2020  PHQ-9 / TALIB-7 : N/A    DATA  Interactive Complexity: No  Crisis: No       Progress Since Last Session (Related to Symptoms / Goals / Homework):   Symptoms: Improving : The client reported experiencing slightly increased motivation and slightly increased energy since the previous phone visit.    Homework: Achieved / completed to satisfaction. The client reported that she did complete the first section of her disability paperwork.      Episode of Care Goals: Minimal progress - ACTION (Actively working towards " change); Intervened by reinforcing change plan / affirming steps taken.     Current / Ongoing Stressors and Concerns:   The client reported that her most significant stressors are managing the symptoms of mental illness and physical illness she has been experiencing.     Treatment Objective(s) Addressed in This Session:   The client will learn and apply mindfulness skills.     Intervention:   Discussed and reinforced the client's efforts to complete the first section of her disability paperwork. Presented didactic information about mindfulness. Talked at length about the concept of effectiveness. Obtained a commitment from the client to complete the remainder of her disability paperwork.        ASSESSMENT: Current Emotional / Mental Status (status of significant symptoms):   Risk status (Self / Other harm or suicidal ideation)   Client denies current fears or concerns for personal safety.   Client denies current or recent suicidal ideation or behaviors.   Client denies current or recent homicidal ideation or behaviors.   Client denies current or recent self injurious behavior or ideation.   Client denies other safety concerns.   Client reports there has been no change in risk factors since her last session.     Client reports there has been no change in protective factors since her last session.     Recommended that the client call 911 or go to the local emergency department should there be a change in any of these risk factors.     Attitude:   Cooperative    Orientation:   All   Speech    Rate / Production: Normal/ Responsive    Volume:  Normal    Mood:    Depressed    Thought Content:  Clear    Thought Form:  Coherent  Logical    Insight:    Good      Medication Review:   No changes to current psychiatric medication(s).     Medication Compliance:   Yes     Changes in Health Issues:   None reported.     Chemical Use Review:   Substance Use: Chemical use reviewed. No active concerns identified.      Tobacco Use: No  current tobacco use.      Diagnosis:  1. Generalized anxiety disorder    2. Moderate episode of recurrent major depressive disorder (H)        Collateral Reports Completed:   Not Applicable    PLAN: (Patient Tasks / Therapist Tasks / Other)  Complete the remainder of her disability paperwork.        I have reviewed the note as documented above. This accurately captures the substance of my conversation with the client.  CHARLES Santamaria., BOB.                                                         ______________________________________________________________________    Treatment Plan    Patient's Name: Bess Enamorado  YOB: 1975    Date: 4/30/2020    DSM5 Diagnoses:     296.32 (F33.1) Major Depressive Disorder, Recurrent Episode, Moderate  300.02 (F41.1) Generalized Anxiety Disorder    Psychosocial / Contextual Factors: The client is a [age] year old  single female who resides in a home in [place], [place]. The client is currently on a medical leave of absence from her job as a . The client has multiple physical health issues. In addition, she struggles to manage symptoms of major depressive disorder and generalized anxiety disorder.    WHODAS: Not Completed    Referral / Collaboration:  Referral to another professional/service is not indicated at this time.    Anticipated number of session or this episode of care: 12    MeasurableTreatment Goal(s) related to diagnosis / functional impairment(s)  Goal 1: The client will learn and apply skills to manage the symptoms of mental illness she has been experiencing.    I will know I've met my goal when I have less anxiety, more energy and more motivation.      Objective #A    The client will learn and apply mindfulness skills.  Status: New - Date: 4/30/2020     Intervention(s)  Therapist will teach and  the client in learning and applying mindfulness skills.    Objective #B  The client will learn and  apply distress tolerance skills.  Status: New - Date: 4/30/2020     Intervention(s)  Therapist will teach and  the client in learning and applying distress tolerance skills.    Objective #C  The client will learn and apply emotion regulation skills.  Status: New - Date: 4/30/2020     Intervention(s)  Therapist will teach and  the client in learning and applying emotion regulation skills.    Objective #D  The client will learn and apply interpersonal effectiveness skills.    Status: New - Date: 4/30/2020     Intervention(s)  Therapist will teach and  the client in learning and applying interpersonal effectiveness skills.       The client has reviewed and verbally agreed to the above plan.      Becky Mendenhall, M.S.W., L.I.C.S.W.  May 1, 2020

## 2020-05-12 ENCOUNTER — VIRTUAL VISIT (OUTPATIENT)
Dept: PSYCHOLOGY | Facility: CLINIC | Age: 45
End: 2020-05-12
Payer: COMMERCIAL

## 2020-05-12 DIAGNOSIS — F33.1 MODERATE EPISODE OF RECURRENT MAJOR DEPRESSIVE DISORDER (H): ICD-10-CM

## 2020-05-12 DIAGNOSIS — F41.1 GENERALIZED ANXIETY DISORDER: Primary | ICD-10-CM

## 2020-05-12 PROCEDURE — 90834 PSYTX W PT 45 MINUTES: CPT | Mod: GT | Performed by: SOCIAL WORKER

## 2020-05-13 NOTE — PROGRESS NOTES
Progress Note    Patient Name: Bess Enamorado  Date: 5/12/2020         Service Type: Individual      Session Start Time: 2:30 PM  Session End Time: 3:15 PM     Session Length: 45 Minutes    Session #: 4    Attendees: Client    Telemedicine Visit: The client's condition can be safely assessed and treated via synchronous audio and visual telemedicine encounter.      Reason for Telemedicine Visit: The clinic was closed to client appointments due to COVID-19.    Originating Site (Patient Location): Client's Home    Distant Site (Provider Location): Provider Remote Setting    Consent:  The client has verbally consented to: the potential risks and benefits of telemedicine (video visit) versus in person care; bill my insurance or make self-payment for services provided; and responsibility for payment of non-covered services.      Treatment Plan Last Reviewed: 4/30/2020  PHQ-9 / TALIB-7 : N/A    DATA  Interactive Complexity: No  Crisis: No       Progress Since Last Session (Related to Symptoms / Goals / Homework):   Symptoms: Worsening : The client reported experiencing increased anxiety since the previous phone visit.    Homework: Achieved / completed to satisfaction. The client reported that she did complete the remainder of her disability paperwork.      Episode of Care Goals: No improvement - ACTION (Actively working towards change); Intervened by reinforcing change plan / affirming steps taken.     Current / Ongoing Stressors and Concerns:   The client reported that her most significant stressors are managing the symptoms of mental illness and physical illness she has been experiencing.     Treatment Objective(s) Addressed in This Session:   The client will learn and apply mindfulness skills. The client will learn and apply distress tolerance skills.     Intervention:   Discussed and reinforced the client's efforts to complete the remainder of her disability paperwork. Examined  the client's increased anxiety. Coached the client on accessing guided imagery on [employer]. Obtained a commitment from the client to use guided imagery on [employer] daily.     ASSESSMENT: Current Emotional / Mental Status (status of significant symptoms):   Risk status (Self / Other harm or suicidal ideation)   Client denies current fears or concerns for personal safety.   Client denies current or recent suicidal ideation or behaviors.   Client denies current or recent homicidal ideation or behaviors.   Client denies current or recent self injurious behavior or ideation.   Client denies other safety concerns.   Client reports there has been no change in risk factors since her last session.     Client reports there has been no change in protective factors since her last session.     Recommended that the client call 911 or go to the local emergency department should there be a change in any of these risk factors.     Appearance:   Appropriate    Eye Contact:   Good    Psychomotor Behavior: Restless    Attitude:   Cooperative    Orientation:   All   Speech    Rate / Production: Normal/ Responsive    Volume:  Normal    Mood:    Anxious    Affect:    Appropriate    Thought Content:  Clear    Thought Form:  Coherent  Logical    Insight:    Good      Medication Review:   No changes to current psychiatric medication(s).     Medication Compliance:   Yes     Changes in Health Issues:   None reported.     Chemical Use Review:   Substance Use: Chemical use reviewed. No active concerns identified      Tobacco Use: No current tobacco use.      Diagnosis:  1. Generalized anxiety disorder    2. Moderate episode of recurrent major depressive disorder (H)        Collateral Reports Completed:   Not Applicable    PLAN: (Patient Tasks / Therapist Tasks / Other)  Use guided imagery on [employer] daily.        I have reviewed the note as documented above. This accurately captures the substance of my conversation with the client.  Becky RODRIGUEZ  CHARLES Mendenhall., BOB.                                                         ______________________________________________________________________      Treatment Plan     Patient's Name: Bess Enamorado                        YOB: 1975     Date: 4/30/2020     DSM5 Diagnoses:      296.32 (F33.1) Major Depressive Disorder, Recurrent Episode, Moderate  300.02 (F41.1) Generalized Anxiety Disorder     Psychosocial / Contextual Factors: The client is a [age] year old  single female who resides in a home in [place], [place]. The client is currently on a medical leave of absence from her job as a . The client has multiple physical health issues. In addition, she struggles to manage symptoms of major depressive disorder and generalized anxiety disorder.     WHODAS: Not Completed     Referral / Collaboration:  Referral to another professional/service is not indicated at this time.     Anticipated number of session or this episode of care: 12     MeasurableTreatment Goal(s) related to diagnosis / functional impairment(s)  Goal 1: The client will learn and apply skills to manage the symptoms of mental illness she has been experiencing.    I will know I've met my goal when I have less anxiety, more energy and more motivation.       Objective #A    The client will learn and apply mindfulness skills.  Status: New - Date: 4/30/2020      Intervention(s)  Therapist will teach and  the client in learning and applying mindfulness skills.     Objective #B  The client will learn and apply distress tolerance skills.  Status: New - Date: 4/30/2020      Intervention(s)  Therapist will teach and  the client in learning and applying distress tolerance skills.     Objective #C  The client will learn and apply emotion regulation skills.  Status: New - Date: 4/30/2020      Intervention(s)  Therapist will teach and  the client in learning and applying emotion regulation  skills.     Objective #D  The client will learn and apply interpersonal effectiveness skills.                    Status: New - Date: 4/30/2020      Intervention(s)  Therapist will teach and  the client in learning and applying interpersonal effectiveness skills.        The client has reviewed and verbally agreed to the above plan.        Becky Mendenhall M.S.ONEYDA., L.[email or web address].[email or web address].              May 1, 2020

## 2020-05-20 ENCOUNTER — PATIENT OUTREACH (OUTPATIENT)
Dept: CARE COORDINATION | Facility: CLINIC | Age: 45
End: 2020-05-20

## 2020-05-20 NOTE — PROGRESS NOTES
Clinic Care Coordination Contact    Follow Up Progress Note      Assessment: Patient reports that she is doing better.  She is following up with counseling weekly and working on getting her employment disability paperwork completed.    Patient is working with home care to reduce her lymphedema and get properly fitted wheelchair.    Goals addressed this encounter:   Goals Addressed                 This Visit's Progress      Mental Health Management (pt-stated)   50%     Goal Statement: I want to improve my depression by getting on disability so I don't have to work.   Date Goal set: 2/28/2020  Barriers: MS, energy level, disability process/time line  Strengths: care coordination, resources  Date to Achieve By: 8/26/20  Patient expressed understanding of goal: yes  Action steps to achieve this goal:  1. I will look through the resources sent by  and decide on a route for applying (on own or with agency support)  2. I will apply for disability through work/union and complete all application(s) necessary                 Intervention/Education provided during outreach: Patient reports that counseling is helping her set goals on getting the paperwork completed.  She has been working on it in stages and feels like she is doing okay with that process.  She has identified disability through work and union that she is applying for.    Patient feels she is getting good support through home care on her lymphedema as well as a properly fitted wheelchair.  She identifies these and to work with a counselor as help with improving her depression.    Patient had no other questions or concerns.  Plan is still to continue to work on getting that disability paperwork process     Outreach Frequency: monthly    Plan:   Patient to complete the next stage of the application so she can submit.  Patient to continue with counseling and home care to help with her depression and health.  Care Coordinator will follow up in 1  month.    KAY Cotton, [facility] - Care Coordinator   [facility]  5/20/2020   9:11 AM  [phone]

## 2020-05-27 ENCOUNTER — TELEPHONE (OUTPATIENT)
Dept: FAMILY MEDICINE | Facility: CLINIC | Age: 45
End: 2020-05-27

## 2020-05-27 NOTE — TELEPHONE ENCOUNTER
Form needs completion/signature for  - [facility] Manual Wheelchair  Paperwork placed in forms box.    Ibeth Ferrer  [facility] [place]

## 2020-05-28 ENCOUNTER — VIRTUAL VISIT (OUTPATIENT)
Dept: PSYCHOLOGY | Facility: CLINIC | Age: 45
End: 2020-05-28
Payer: COMMERCIAL

## 2020-05-28 DIAGNOSIS — F33.1 MODERATE EPISODE OF RECURRENT MAJOR DEPRESSIVE DISORDER (H): ICD-10-CM

## 2020-05-28 DIAGNOSIS — F41.1 GENERALIZED ANXIETY DISORDER: Primary | ICD-10-CM

## 2020-05-28 PROCEDURE — 90834 PSYTX W PT 45 MINUTES: CPT | Mod: GT | Performed by: SOCIAL WORKER

## 2020-05-29 ENCOUNTER — VIRTUAL VISIT (OUTPATIENT)
Dept: FAMILY MEDICINE | Facility: CLINIC | Age: 45
End: 2020-05-29
Payer: COMMERCIAL

## 2020-05-29 DIAGNOSIS — E66.01 MORBID OBESITY (H): Chronic | ICD-10-CM

## 2020-05-29 DIAGNOSIS — M62.81 GENERALIZED MUSCLE WEAKNESS: ICD-10-CM

## 2020-05-29 DIAGNOSIS — G35 MULTIPLE SCLEROSIS (H): Primary | Chronic | ICD-10-CM

## 2020-05-29 DIAGNOSIS — R60.0 LEG EDEMA: ICD-10-CM

## 2020-05-29 PROCEDURE — 99214 OFFICE O/P EST MOD 30 MIN: CPT | Mod: GT | Performed by: FAMILY MEDICINE

## 2020-05-29 NOTE — TELEPHONE ENCOUNTER
Form filled out and faxed along with notes to [phone].  [facility]

## 2020-05-29 NOTE — LETTER
"2020    INSURER: Payor: [employer] / Plan: [employer] OPEN ACCESS / Product Type: HMO /     Re: Prior Authorization Request  Patient: Bess Enamorado  Policy ID#:  [id number]  : 1975      To Whom it May Concern:    I am writing to formally request a prior authorization of coverage for my patient,  Bess Enamorado, for treatment using manual wheelchair I am requesting authorization for applicable provider professional and facility services associated with this therapy.    The therapy involves a manual wheelchair with extra seat width of 24\".      The benefits of the therapy include increased ability to manage her ADLs including cooking and laundry.  She is not able to ambulate with a four wheeled walker for any distance.  Her morbid obesity, bilateral lymphedema and left sided weakness due to multiple sclerosis is quite limiting.  She is not able to complete meal preparation without a wheelchair.  She is not able to complete laundry without a wheelchair.  She is unable to ambulate safely to the bathroom without a manual wheelchair and also needs the WC to access her shower area safely.      With her MS and left sided and general weakness she is at high risk for falling.  She has severe obesity with a weight of 390 lbs at a height of 5'3\".  She needs a properly fitting manual wheelchair to complete her ADLs, IADLs and household mobility.  The use of this properly fitted chair will decrease her risk of falls and increase her overall safety moving around the home.     She cannot safely use a cane due to her MS and weakness of the left side.  She has poor balance due to her MS.      At her height and weight she needs a specially fitted wheelchair.  Height and weight listed above, has been measured to need a 25\" seat width and 16\" seat depth.  She also needs a tiny height WC so she can work on lower extremity propulsion, at her height it's important that her feet touch the ground for stability.  She will " also need a cushion for skin integrity and prevention of skin breakdown given her MS and high risk for pressure sores.     I have treated Bess Enamorado since 6/10/2019 and I have determined that it is medically appropriate for  this patient to receive a specially fitted manual wheelchair for the reason(s) stated above.    I have included medical records pertaining to the patient s medical history, current condition and treatment plan.  In addition, the following billing codes will be used for therapy and follow-up:  (G35) Multiple sclerosis (H)  (primary encounter diagnosis)  (E66.01) Morbid obesity (H)  (R60.0) Leg edema  (M62.81) Generalized muscle weakness      I firmly believe that this DME device is clinically appropriate and that Bess Enamorado would benefit from improved quality of life, safety and mobility if allowed the opportunity to receive this treatment.  Please contact me at Dept: [phone] if you require additional information to ensure the prompt approval for coverage.    Please send your written decision to me at this address:  [facility]  [id number] [place] [id number] [phone]  Dept: [phone]         Sincerely,      Mikala Luna MD        Enclosures

## 2020-05-29 NOTE — PROGRESS NOTES
"Bess Enamorado is a 44 year old female who is being evaluated via a billable video visit.      The patient has been notified of following:     \"This video visit will be conducted via a call between you and your physician/provider. We have found that certain health care needs can be provided without the need for an in-person physical exam.  This service lets us provide the care you need with a video conversation.  If a prescription is necessary we can send it directly to your pharmacy.  If lab work is needed we can place an order for that and you can then stop by our lab to have the test done at a later time.    Video visits are billed at different rates depending on your insurance coverage.  Please reach out to your insurance provider with any questions.    If during the course of the call the physician/provider feels a video visit is not appropriate, you will not be charged for this service.\"    Patient has given verbal consent for Video visit? Yes    How would you like to obtain your AVS? Tameka    Patient would like the video invitation sent by: Text to cell phone: [phone]    Will anyone else be joining your video visit? No     Subjective     Bess Enamorado is a 44 year old female who presents today via video visit for the following health issues:    HPI  Pt needs a face to face visit for a wheel chair. She is urinating every hour and it is starting to get to her and not wanting to go anywhere.    This patient was seen virtually during the COVID-19 outbreak in attempts to keep healthy patients out of the clinic per CDC guidelines (practicing social distancing).  I evaluated her by video visit and the note reflects our shared decision making.      I have reviewed her recent Occupational Therapy assessment in detail.     In short, Bess Enamorado is a morbidly obese female with severe secondary lymphedema and multiple sclerosis with generalized weakness and left sided weakness.      She would benefit from a manual " "wheelchair that is properly fitted to increased her ability to manage her ADLs including cooking and laundry.  She is not able to ambulate with a four wheeled walker for any distance.  Her morbid obesity, bilateral lymphedema and left sided weakness due to multiple sclerosis is quite limiting.  She is not able to complete meal preparation without a wheelchair.  She is not able to complete laundry without a wheelchair.  She is unable to ambulate safely to the bathroom without a manual wheelchair and also needs the WC to access her shower area safely.      With her MS and left sided and general weakness she is at high risk for falling.  She has severe obesity with a weight of 390 lbs at a height of 5'3\".  She needs a properly fitting manual wheelchair to complete her ADLs, IADLs and household mobility.  The use of this properly fitted chair will decrease her risk of falls and increase her overall safety moving around the home.     She cannot safely use a cane due to her MS and weakness of the left side.  She has poor balance due to her MS.      At her height and weight she needs a specially fitted wheelchair.  Height and weight listed above, has been measured to need a 25\" seat width and 16\" seat depth.  She also needs a tiny height WC so she can work on lower extremity propulsion, at her height it's important that her feet touch the ground for stability.  She will also need a cushion for skin integrity and prevention of skin breakdown given her MS and high risk for pressure sores.     She does have sufficient upper extremity function and physical and mental capabilities to self propel the manual wheelchair in her home during a typical day.    We also discussed her increase in urinary urgency/frequency.  Has seen a urologist before - last about 3 years ago, tried an oral medication that was ineffective.    She wonders if the dermoid in her pelvis plays a role - last imaged 10.2018 and was 10 cm in size or so at that " "time, so certainly could play a role.  I would like her to see urology again for ongoing work up and care, I don't think this is something amenable to a anticholinergic alone.       Video Start Time: 1120            Reviewed and updated as needed this visit by Provider         Review of Systems   Constitutional, HEENT, cardiovascular, pulmonary, gi and gu systems are negative, except as otherwise noted.      Objective    There were no vitals taken for this visit.  Estimated body mass index is 69.26 kg/m  as calculated from the following:    Height as of 3/4/20: 1.6 m (5' 3\").    Weight as of 3/4/20: 177.4 kg (391 lb).  Physical Exam     GENERAL: healthy, alert, no distress and obese  EYES: Eyes grossly normal to inspection.  No discharge or erythema, or obvious scleral/conjunctival abnormalities.  RESP: No audible wheeze, cough, or visible cyanosis.  No visible retractions or increased work of breathing.    MS: she was able to show me her legs which have compression garments in place but there has been significant reduction in the size of her legs secondary to the lymphedema treatments.   NEURO: Cranial nerves grossly intact.  Mentation and speech appropriate for age.  PSYCH: Mentation appears normal, affect normal/bright, judgement and insight intact, normal speech and appearance well-groomed.      Diagnostic Test Results:  Labs reviewed in Epic        Assessment & Plan       ICD-10-CM    1. Multiple sclerosis (H)  G35    2. Morbid obesity (H)  E66.01    3. Leg edema  R60.0    4. Generalized muscle weakness  M62.81         Letter of medical necessity done for wheelchair - she requires tiny height wheelchair, heavy duty with a skin protection cushion a seat width of 25\" and depth of 16\".       No follow-ups on file.    Mikala Luna MD  [facility]      Video-Visit Details    Type of service:  Video Visit    Video End Time:11:34 AM    Originating Location (pt. Location): Home    Distant Location " (provider location):  [facility]     Platform used for Video Visit: Moisés    No follow-ups on file.       Mikala Luna MD

## 2020-05-30 NOTE — PROGRESS NOTES
Progress Note    Patient Name: Bess Enamorado  Date: 5/28/2020         Service Type: Individual      Session Start Time: 8:30 AM  Session End Time: 9:15 AM     Session Length: 45 Minutes    Session #: 5    Attendees: Client    Telemedicine Visit: The client's condition can be safely assessed and treated via synchronous audio and visual telemedicine encounter.      Reason for Telemedicine Visit: The clinic was closed to client appointments due to COVID-19.    Originating Site (Patient Location): Client's Home    Distant Site (Provider Location): Provider Remote Setting    Consent:  The client has verbally consented to: the potential risks and benefits of telemedicine (video visit) versus in person care; bill my insurance or make self-payment for services provided; and responsibility for payment of non-covered services.      Treatment Plan Last Reviewed: 4/30/2020  PHQ-9 / TALIB-7 : N/A    DATA  Interactive Complexity: No  Crisis: No       Progress Since Last Session (Related to Symptoms / Goals / Homework):   Symptoms: Improving : The cient reported experiencing slightly decreased anxiety and improved sleep since the previous video visit.    Homework: Partially completed. The client reported that she used guided imagery on YouTube once since the previous video visit.      Episode of Care Goals: Minimal progress - ACTION (Actively working towards change); Intervened by reinforcing change plan / affirming steps taken.     Current / Ongoing Stressors and Concerns:   The client reported that her most significant stressors are managing the symptoms of mental illness and physical illness she has been experiencing.     Treatment Objective(s) Addressed in This Session:   The client will learn and apply distress tolerance skills.     Intervention:   Discussed the client's decreased anxiety and improved sleep. Examined and addressed the barriers to using guided imagery on [employer] daily.  Obtained a commitment from the client to use guided imagery on [employer] daily. Talked at length about the client's physical health issues.     ASSESSMENT: Current Emotional / Mental Status (status of significant symptoms):   Risk status (Self / Other harm or suicidal ideation)   Client denies current fears or concerns for personal safety.   Client denies current or recent suicidal ideation or behaviors.   Client denies current or recent homicidal ideation or behaviors.   Client denies current or recent self injurious behavior or ideation.   Client denies other safety concerns.   Client reports there has been no change in risk factors since her last session.     Client reports there has been no change in protective factors since her last session.     Recommended that the client call 911 or go to the local emergency department should there be a change in any of these risk factors.     Appearance:   Appropriate    Eye Contact:   Good    Psychomotor Behavior: Restless    Attitude:   Cooperative    Orientation:   All   Speech    Rate / Production: Normal/ Responsive    Volume:  Normal    Mood:    Mildly Anxious   Affect:    Appropriate    Thought Content:  Clear    Thought Form:  Coherent  Logical    Insight:    Good      Medication Review:   No changes to current psychiatric medication(s).     Medication Compliance:   Yes     Changes in Health Issues:   None reported.     Chemical Use Review:   Substance Use: Chemical use reviewed. No active concerns identified      Tobacco Use: No current tobacco use.      Diagnosis:  1. Generalized anxiety disorder    2. Moderate episode of recurrent major depressive disorder (H)        Collateral Reports Completed:   Not Applicable    PLAN: (Patient Tasks / Therapist Tasks / Other)  Use guided imagery on [employer] daily.        I have reviewed the note as documented above. This accurately captures the substance of my conversation with the client.  Becky Mendenhall, M.S.W., L.I.C.S.W.                                                          ______________________________________________________________________      Treatment Plan     Patient's Name: Bess Enamorado                        YOB: 1975     Date: 4/30/2020     DSM5 Diagnoses:      296.32 (F33.1) Major Depressive Disorder, Recurrent Episode, Moderate  300.02 (F41.1) Generalized Anxiety Disorder     Psychosocial / Contextual Factors: The client is a [age] year old  single female who resides in a home in [place], [place]. The client is currently on a medical leave of absence from her job as a . The client has multiple physical health issues. In addition, she struggles to manage symptoms of major depressive disorder and generalized anxiety disorder.     WHODAS: Not Completed     Referral / Collaboration:  Referral to another professional/service is not indicated at this time.     Anticipated number of session or this episode of care: 12     MeasurableTreatment Goal(s) related to diagnosis / functional impairment(s)  Goal 1: The client will learn and apply skills to manage the symptoms of mental illness she has been experiencing.    I will know I've met my goal when I have less anxiety, more energy and more motivation.       Objective #A    The client will learn and apply mindfulness skills.  Status: New - Date: 4/30/2020      Intervention(s)  Therapist will teach and  the client in learning and applying mindfulness skills.     Objective #B  The client will learn and apply distress tolerance skills.  Status: New - Date: 4/30/2020      Intervention(s)  Therapist will teach and  the client in learning and applying distress tolerance skills.     Objective #C  The client will learn and apply emotion regulation skills.  Status: New - Date: 4/30/2020      Intervention(s)  Therapist will teach and  the client in learning and applying emotion regulation skills.     Objective #D  The  client will learn and apply interpersonal effectiveness skills.                    Status: New - Date: 4/30/2020      Intervention(s)  Therapist will teach and  the client in learning and applying interpersonal effectiveness skills.        The client has reviewed and verbally agreed to the above plan.        Becky Mendenhall M.S.ONEYDA., [email or web address].              May 1, 2020

## 2020-06-05 DIAGNOSIS — Z53.9 DIAGNOSIS NOT YET DEFINED: Primary | ICD-10-CM

## 2020-06-05 PROCEDURE — G0179 MD RECERTIFICATION HHA PT: HCPCS | Performed by: INTERNAL MEDICINE

## 2020-06-11 ENCOUNTER — MYC MEDICAL ADVICE (OUTPATIENT)
Dept: FAMILY MEDICINE | Facility: CLINIC | Age: 45
End: 2020-06-11

## 2020-06-11 ENCOUNTER — VIRTUAL VISIT (OUTPATIENT)
Dept: PSYCHOLOGY | Facility: CLINIC | Age: 45
End: 2020-06-11
Payer: COMMERCIAL

## 2020-06-11 DIAGNOSIS — I89.0 LYMPHEDEMA OF BOTH LOWER EXTREMITIES: ICD-10-CM

## 2020-06-11 DIAGNOSIS — F41.1 GENERALIZED ANXIETY DISORDER: Primary | ICD-10-CM

## 2020-06-11 DIAGNOSIS — F33.1 MODERATE EPISODE OF RECURRENT MAJOR DEPRESSIVE DISORDER (H): ICD-10-CM

## 2020-06-11 PROCEDURE — 90834 PSYTX W PT 45 MINUTES: CPT | Mod: GT | Performed by: SOCIAL WORKER

## 2020-06-13 NOTE — PROGRESS NOTES
Progress Note    Patient Name: Bess Enamorado  Date: 6/11/2020         Service Type: Individual      Session Start Time: 10:30 AM  Session End Time: 11:15 AM     Session Length: 45 Minutes    Session #: 6    Attendees: Client    Telemedicine Visit: The client's condition can be safely assessed and treated via synchronous audio and visual telemedicine encounter.      Reason for Telemedicine Visit: The clinic was closed to client appointments due to COVID-19.    Originating Site (Patient Location): Client's Home    Distant Site (Provider Location): Provider Remote Setting    Consent:  The client has verbally consented to: the potential risks and benefits of telemedicine (video visit) versus in person care; bill my insurance or make self-payment for services provided; and responsibility for payment of non-covered services.      Treatment Plan Last Reviewed: 4/30/2020  PHQ-9 / TALIB-7 : N/A    DATA  Interactive Complexity: No  Crisis: No       Progress Since Last Session (Related to Symptoms / Goals / Homework):   Symptoms: Worsening : The client reported experiencing increased emotional dysregulation over the course of the last week.    Homework: Partially completed. The client reported that she used guided imagery on YouTube once since the previous video visit.      Episode of Care Goals: No improvement - CONTEMPLATION (Considering change and yet undecided); Intervened by assessing the negative and positive thinking (ambivalence) about behavior change.     Current / Ongoing Stressors and Concerns:   The client reported that her most significant stressors are managing the symptoms of mental illness and physical illness she has been experiencing.     Treatment Objective(s) Addressed in This Session:   The client will learn and apply emotion regulation skills. The client will learn and apply distress tolerance skills.     Intervention:   Discussed the client's increased emotional  dysregulation. Presented didactic information about emotional regulation and emotional dysregulation. Examined and addressed the barriers to using guided imagery on [employer] daily. Obtained a commitment from the client to use guided imagery on [employer] daily.     ASSESSMENT: Current Emotional / Mental Status (status of significant symptoms):   Risk status (Self / Other harm or suicidal ideation)   Client denies current fears or concerns for personal safety.   Client denies current or recent suicidal ideation or behaviors.   Client denies current or recent homicidal ideation or behaviors.   Client denies current or recent self injurious behavior or ideation.   Client denies other safety concerns.   Client reports there has been no change in risk factors since her last session.     Client reports there has been no change in protective factors since her last session.     Recommended that the client call 911 or go to the local emergency department should there be a change in any of these risk factors.     Appearance:   Appropriate    Eye Contact:   Good    Psychomotor Behavior: Restless    Attitude:   Cooperative    Orientation:   All   Speech    Rate / Production: Normal/ Responsive    Volume:  Normal    Mood:    Anxious   Affect:    Appropriate    Thought Content:  Clear    Thought Form:  Coherent  Logical    Insight:    Good      Medication Review:   No changes to current psychiatric medication(s).     Medication Compliance:   Yes     Changes in Health Issues:   None reported.     Chemical Use Review:   Substance Use: Chemical use reviewed. No active concerns identified      Tobacco Use: No current tobacco use.      Diagnosis:  1. Generalized anxiety disorder    2. Moderate episode of recurrent major depressive disorder (H)        Collateral Reports Completed:   Not Applicable    PLAN: (Patient Tasks / Therapist Tasks / Other)  Use guided imagery on [employer] daily.        I have reviewed the note as documented above.  This accurately captures the substance of my conversation with the client.  CHARLES Santamaria., BOB.                                                         ______________________________________________________________________      Treatment Plan     Patient's Name: Bess Enamorado                        YOB: 1975     Date: 4/30/2020     DSM5 Diagnoses:      296.32 (F33.1) Major Depressive Disorder, Recurrent Episode, Moderate  300.02 (F41.1) Generalized Anxiety Disorder     Psychosocial / Contextual Factors: The client is a [age] year old  single female who resides in a home in [place], [place]. The client is currently on a medical leave of absence from her job as a . The client has multiple physical health issues. In addition, she struggles to manage symptoms of major depressive disorder and generalized anxiety disorder.     WHODAS: Not Completed     Referral / Collaboration:  Referral to another professional/service is not indicated at this time.     Anticipated number of session or this episode of care: 12     MeasurableTreatment Goal(s) related to diagnosis / functional impairment(s)  Goal 1: The client will learn and apply skills to manage the symptoms of mental illness she has been experiencing.    I will know I've met my goal when I have less anxiety, more energy and more motivation.       Objective #A    The client will learn and apply mindfulness skills.  Status: New - Date: 4/30/2020      Intervention(s)  Therapist will teach and  the client in learning and applying mindfulness skills.     Objective #B  The client will learn and apply distress tolerance skills.  Status: New - Date: 4/30/2020      Intervention(s)  Therapist will teach and  the client in learning and applying distress tolerance skills.     Objective #C  The client will learn and apply emotion regulation skills.  Status: New - Date: 4/30/2020       Intervention(s)  Therapist will teach and  the client in learning and applying emotion regulation skills.     Objective #D  The client will learn and apply interpersonal effectiveness skills.                    Status: New - Date: 4/30/2020      Intervention(s)  Therapist will teach and  the client in learning and applying interpersonal effectiveness skills.        The client has reviewed and verbally agreed to the above plan.        Becky Mendenhall M.S.ONEYDA., L.I.C.S.W.              May 1, 2020

## 2020-06-17 ENCOUNTER — VIRTUAL VISIT (OUTPATIENT)
Dept: PSYCHOLOGY | Facility: CLINIC | Age: 45
End: 2020-06-17
Payer: COMMERCIAL

## 2020-06-17 DIAGNOSIS — F41.1 GENERALIZED ANXIETY DISORDER: Primary | ICD-10-CM

## 2020-06-17 DIAGNOSIS — F33.1 MODERATE EPISODE OF RECURRENT MAJOR DEPRESSIVE DISORDER (H): ICD-10-CM

## 2020-06-17 PROCEDURE — 90834 PSYTX W PT 45 MINUTES: CPT | Mod: GT | Performed by: SOCIAL WORKER

## 2020-06-18 NOTE — PROGRESS NOTES
Progress Note    Patient Name: Bess Enamorado  Date: 6/17/2020         Service Type: Individual      Session Start Time: 10:30 AM  Session End Time: 11:15 AM     Session Length: 45 Minutes    Session #: 7    Attendees: Client      Mode of Communication:  Video Conference via [employer]    Telemedicine Visit: The client's condition can be safely assessed and treated via synchronous audio and visual telemedicine encounter.      Reason for Telemedicine Visit: The clinic was closed to client appointments due to COVID-19.    Originating Site (Patient Location): Client's Home    Distant Site (Provider Location): Provider Remote Setting    Consent:  The client has verbally consented to: the potential risks and benefits of telemedicine (video visit) versus in person care; bill my insurance or make self-payment for services provided; and responsibility for payment of non-covered services.      Treatment Plan Last Reviewed: 4/30/2020  PHQ-9 / TALIB-7 : N/A    DATA  Interactive Complexity: No  Crisis: No       Progress Since Last Session (Related to Symptoms / Goals / Homework):   Symptoms: Improving : The client reported experiencing decreased anxiety and improved emotional regulation since the previous video visit.    Homework: Achieved / completed to satisfaction. The client reported that she has been using guided imagery on [employer] daily.      Episode of Care Goals: Satisfactory progress - ACTION (Actively working towards change); Intervened by reinforcing change plan / affirming steps taken.     Current / Ongoing Stressors and Concerns:   The client reported that her most significant stressors are managing the symptoms of mental illness and physical illness she has been experiencing.     Treatment Objective(s) Addressed in This Session:   The client will learn and apply distress tolerance skills. The client will learn and apply interpersonal effectiveness  skills.     Intervention:   Discussed and reinforced the client's efforts to use guided imagery on YouTube daily. Examined the client's decreased anxiety and improved emotional regulation. Talked at length about the client's desire to socialize with her friends. Created a plan for the client to invite three friends over to her house to sociallize. Obtained a commitment from the client to follow the plan.     ASSESSMENT: Current Emotional / Mental Status (status of significant symptoms):   Risk status (Self / Other harm or suicidal ideation)   Client denies current fears or concerns for personal safety.   Client denies current or recent suicidal ideation or behaviors.   Client denies current or recent homicidal ideation or behaviors.   Client denies current or recent self injurious behavior or ideation.   Client denies other safety concerns.   Client reports there has been no change in risk factors since her last session.     Client reports there has been no change in protective factors since her last session.     Recommended that the client call 911 or go to the local emergency department should there be a change in any of these risk factors.     Appearance:   Appropriate    Eye Contact:   Good    Psychomotor Behavior: Normal    Attitude:   Cooperative    Orientation:   All   Speech    Rate / Production: Normal/ Responsive    Volume:  Normal    Mood:    Mildy Anxious   Affect:    Appropriate    Thought Content:  Clear    Thought Form:  Coherent  Logical    Insight:    Good      Medication Review:   No changes to current psychiatric medication(s).     Medication Compliance:   Yes     Changes in Health Issues:   None reported.     Chemical Use Review:   Substance Use: Chemical use reviewed. No active concerns identified      Tobacco Use: No current tobacco use.      Diagnosis:  1. Generalized anxiety disorder    2. Moderate episode of recurrent major depressive disorder (H)        Collateral Reports Completed:   Not  Applicable    PLAN: (Patient Tasks / Therapist Tasks / Other)  Invite three friends over to her house to socialize.        I have reviewed the note as documented above. This accurately captures the substance of my conversation with the client.  CHARLES Santamaria., BOB.                                                         ______________________________________________________________________      Treatment Plan     Patient's Name: Bess Enamorado                        YOB: 1975     Date: 4/30/2020     DSM5 Diagnoses:      296.32 (F33.1) Major Depressive Disorder, Recurrent Episode, Moderate  300.02 (F41.1) Generalized Anxiety Disorder     Psychosocial / Contextual Factors: The client is a [age] year old  single female who resides in a home in [place], [place]. The client is currently on a medical leave of absence from her job as a . The client has multiple physical health issues. In addition, she struggles to manage symptoms of major depressive disorder and generalized anxiety disorder.     WHODAS: Not Completed     Referral / Collaboration:  Referral to another professional/service is not indicated at this time.     Anticipated number of session or this episode of care: 12     MeasurableTreatment Goal(s) related to diagnosis / functional impairment(s)  Goal 1: The client will learn and apply skills to manage the symptoms of mental illness she has been experiencing.    I will know I've met my goal when I have less anxiety, more energy and more motivation.       Objective #A    The client will learn and apply mindfulness skills.  Status: New - Date: 4/30/2020      Intervention(s)  Therapist will teach and  the client in learning and applying mindfulness skills.     Objective #B  The client will learn and apply distress tolerance skills.  Status: New - Date: 4/30/2020      Intervention(s)  Therapist will teach and  the client in learning and  applying distress tolerance skills.     Objective #C  The client will learn and apply emotion regulation skills.  Status: New - Date: 4/30/2020      Intervention(s)  Therapist will teach and  the client in learning and applying emotion regulation skills.     Objective #D  The client will learn and apply interpersonal effectiveness skills.                    Status: New - Date: 4/30/2020      Intervention(s)  Therapist will teach and  the client in learning and applying interpersonal effectiveness skills.        The client has reviewed and verbally agreed to the above plan.        Becky Mendenhall, M.S.W., L.I.C.S.W.              May 1, 2020

## 2020-06-24 ENCOUNTER — VIRTUAL VISIT (OUTPATIENT)
Dept: PSYCHOLOGY | Facility: CLINIC | Age: 45
End: 2020-06-24
Payer: COMMERCIAL

## 2020-06-24 DIAGNOSIS — F33.1 MODERATE EPISODE OF RECURRENT MAJOR DEPRESSIVE DISORDER (H): ICD-10-CM

## 2020-06-24 DIAGNOSIS — F41.1 GENERALIZED ANXIETY DISORDER: Primary | ICD-10-CM

## 2020-06-24 PROCEDURE — 90834 PSYTX W PT 45 MINUTES: CPT | Mod: GT | Performed by: SOCIAL WORKER

## 2020-06-27 NOTE — PROGRESS NOTES
Progress Note    Patient Name: Bess Enamorado  Date: 6/24/2020         Service Type: Individual      Session Start Time: 3:30 PM  Session End Time: 4:20 PM     Session Length: 50 Minutes    Session #: 8    Attendees: Client      Mode of Communication:  Video Conference via [employer]    Telemedicine Visit: The client's condition can be safely assessed and treated via synchronous audio and visual telemedicine encounter.      Reason for Telemedicine Visit: The clinic was closed to client appointments due to COVID-19.    Originating Site (Patient Location): Client's Home    Distant Site (Provider Location): Provider Remote Setting    Consent:  The client has verbally consented to: the potential risks and benefits of telemedicine (video visit) versus in person care; bill my insurance or make self-payment for services provided; and responsibility for payment of non-covered services.      Treatment Plan Last Reviewed: 4/30/2020  PHQ-9 / TALIB-7 : N/A    DATA  Interactive Complexity: No  Crisis: No       Progress Since Last Session (Related to Symptoms / Goals / Homework):   Symptoms: Improving : The client reported experiencing decreased anxiety and improved emotional regulation since the previous video visit.    Homework: Did not complete. The client reported that she did not Invite three friends over to her house to socialize.      Episode of Care Goals: Satisfactory progress - PREPARATION (Decided to change - considering how); Intervened by negotiating a change plan and determining options / strategies for behavior change, identifying triggers, exploring social supports, and working towards setting a date to begin behavior change.     Current / Ongoing Stressors and Concerns:   The client reported that her most significant stressors are managing the symptoms of mental illness and physical illness she has been experiencing.     Treatment Objective(s) Addressed in This  Session:   The client will learn and apply interpersonal effectiveness skills.     Intervention:   Examined the client's continued decreased anxiety and improved emotional regulation. Identified and addressed the barriers to the client inviting three friends over to her house to socialize. Obtained a commitment from the client to invite three friends over to her house to socialize.     ASSESSMENT: Current Emotional / Mental Status (status of significant symptoms):   Risk status (Self / Other harm or suicidal ideation)   Client denies current fears or concerns for personal safety.   Client denies current or recent suicidal ideation or behaviors.   Client denies current or recent homicidal ideation or behaviors.   Client denies current or recent self injurious behavior or ideation.   Client denies other safety concerns.   Client reports there has been no change in risk factors since her last session.     Client reports there has been no change in protective factors since her last session.     Recommended that the client call 911 or go to the local emergency department should there be a change in any of these risk factors.     Appearance:   Appropriate    Eye Contact:   Good    Psychomotor Behavior: Normal    Attitude:   Cooperative    Orientation:   All   Speech    Rate / Production: Normal/ Responsive    Volume:  Normal    Mood:    Slightly Anxious   Affect:    Appropriate    Thought Content:  Clear    Thought Form:  Coherent  Logical    Insight:    Good      Medication Review:   No changes to current psychiatric medication(s).     Medication Compliance:   Yes     Changes in Health Issues:   None reported.     Chemical Use Review:   Substance Use: Chemical use reviewed. No active concerns identified      Tobacco Use: No current tobacco use.      Diagnosis:  1. Generalized anxiety disorder    2. Moderate episode of recurrent major depressive disorder (H)        Collateral Reports Completed:   Not Applicable    PLAN:  (Patient Tasks / Therapist Tasks / Other)  Invite three friends over to her house to socialize.        I have reviewed the note as documented above. This accurately captures the substance of my conversation with the client.  CHARLES Santamaria., BOB.                                                         ______________________________________________________________________      Treatment Plan     Patient's Name: Bess Enamorado                        YOB: 1975     Date: 4/30/2020     DSM5 Diagnoses:      296.32 (F33.1) Major Depressive Disorder, Recurrent Episode, Moderate  300.02 (F41.1) Generalized Anxiety Disorder     Psychosocial / Contextual Factors: The client is a [age] year old  single female who resides in a home in [place], [place]. The client is currently on a medical leave of absence from her job as a . The client has multiple physical health issues. In addition, she struggles to manage symptoms of major depressive disorder and generalized anxiety disorder.     WHODAS: Not Completed     Referral / Collaboration:  Referral to another professional/service is not indicated at this time.     Anticipated number of session or this episode of care: 12     MeasurableTreatment Goal(s) related to diagnosis / functional impairment(s)  Goal 1: The client will learn and apply skills to manage the symptoms of mental illness she has been experiencing.    I will know I've met my goal when I have less anxiety, more energy and more motivation.       Objective #A    The client will learn and apply mindfulness skills.  Status: New - Date: 4/30/2020      Intervention(s)  Therapist will teach and  the client in learning and applying mindfulness skills.     Objective #B  The client will learn and apply distress tolerance skills.  Status: New - Date: 4/30/2020      Intervention(s)  Therapist will teach and  the client in learning and applying distress  tolerance skills.     Objective #C  The client will learn and apply emotion regulation skills.  Status: New - Date: 4/30/2020      Intervention(s)  Therapist will teach and  the client in learning and applying emotion regulation skills.     Objective #D  The client will learn and apply interpersonal effectiveness skills.                    Status: New - Date: 4/30/2020      Intervention(s)  Therapist will teach and  the client in learning and applying interpersonal effectiveness skills.        The client has reviewed and verbally agreed to the above plan.        Becky Mendenhall, M.S.W., L.I.C.S.W.              May 1, 2020

## 2020-06-29 ENCOUNTER — TRANSFERRED RECORDS (OUTPATIENT)
Dept: HEALTH INFORMATION MANAGEMENT | Facility: CLINIC | Age: 45
End: 2020-06-29

## 2020-07-06 ENCOUNTER — PATIENT OUTREACH (OUTPATIENT)
Dept: CARE COORDINATION | Facility: CLINIC | Age: 45
End: 2020-07-06

## 2020-07-06 ENCOUNTER — E-VISIT (OUTPATIENT)
Dept: FAMILY MEDICINE | Facility: CLINIC | Age: 45
End: 2020-07-06
Payer: COMMERCIAL

## 2020-07-06 ENCOUNTER — MYC MEDICAL ADVICE (OUTPATIENT)
Dept: FAMILY MEDICINE | Facility: CLINIC | Age: 45
End: 2020-07-06

## 2020-07-06 DIAGNOSIS — M25.552 HIP PAIN, LEFT: Primary | ICD-10-CM

## 2020-07-06 PROCEDURE — 99421 OL DIG E/M SVC 5-10 MIN: CPT | Performed by: FAMILY MEDICINE

## 2020-07-06 RX ORDER — TRAMADOL HYDROCHLORIDE 50 MG/1
50 TABLET ORAL EVERY 6 HOURS PRN
Qty: 10 TABLET | Refills: 0 | Status: SHIPPED | OUTPATIENT
Start: 2020-07-06 | End: 2020-07-09

## 2020-07-06 NOTE — LETTER
Stephanie Kellogg,       I have been attempting to reach you since our last contact. I would like to continue to work with you on the goals from our last conversation and provide the support you may need. I would appreciate if you would give me a call at [phone] and let me know if you would like to continue working together. I know that there are many things that can affect our ability to communicate and hope we can plan better moving forward.     All of us at [facility] are invested in your health and are here to assist you in meeting your goals.     Sincerely,      Sondra Callaway, ONEYDA  [facility] - Care Coordination  [facility]   [phone]

## 2020-07-06 NOTE — PROGRESS NOTES
Clinic Care Coordination Contact  [facility]/Voicemail       Clinical Data: Care Coordinator Outreach  Outreach attempted x 1.  Not able to leave a voice mail.   Plan: Care Coordinator will try to reach patient again in 3-5 business days.    KAY Cotton, [facility] - Care Coordinator   [facility]  7/6/2020   3:40 PM  [phone]

## 2020-07-10 NOTE — PROGRESS NOTES
Clinic Care Coordination Contact  [facility]/Voicemail       Clinical Data: Care Coordinator Outreach  Outreach attempted x 2.  Left message on patient's voicemail with call back information and requested return call.  Plan: Care Coordinator will send unable to contact letter with care coordinator contact information via [employer]. Care Coordinator will try to reach patient again in 1 month.    KAY Cotton, [facility] - Care Coordinator   [facility]  7/10/2020   9:47 AM  [phone]

## 2020-07-15 ENCOUNTER — VIRTUAL VISIT (OUTPATIENT)
Dept: PSYCHOLOGY | Facility: CLINIC | Age: 45
End: 2020-07-15
Payer: COMMERCIAL

## 2020-07-15 DIAGNOSIS — F41.1 GENERALIZED ANXIETY DISORDER: Primary | ICD-10-CM

## 2020-07-15 DIAGNOSIS — F33.1 MODERATE EPISODE OF RECURRENT MAJOR DEPRESSIVE DISORDER (H): ICD-10-CM

## 2020-07-15 PROCEDURE — 90834 PSYTX W PT 45 MINUTES: CPT | Mod: GT | Performed by: SOCIAL WORKER

## 2020-07-17 NOTE — PROGRESS NOTES
Progress Note    Patient Name: Bess Enamorado  Date: 7/15/2020       Service Type: Individual      Session Start Time: 2:30 PM  Session End Time: 3:20 PM     Session Length: 50 Minutes    Session #: 9    Attendees: Client      Mode of Communication:  Video Conference via [employer]    Telemedicine Visit: The client's condition can be safely assessed and treated via synchronous audio and visual telemedicine encounter.      Reason for Telemedicine Visit: The clinic was closed to client appointments due to COVID-19.    Originating Site (Patient Location): Client's Home    Distant Site (Provider Location): Provider Remote Setting    Consent:  The client has verbally consented to: the potential risks and benefits of telemedicine (video visit) versus in person care. She agreed that her insurance would be billed. She also agreed to make self-payment for services provided, if needed, and she agreed to take responsibility for payment of non-covered services.      Treatment Plan Last Reviewed: 4/30/2020  PHQ-9 / TALIB-7 : N/A    DATA  Interactive Complexity: No  Crisis: No       Progress Since Last Session (Related to Symptoms / Goals / Homework):   Symptoms: No change : The client reported no change in her symptoms since the previous video visit.    Homework: Partially completed. The client reported that she did invite two friends over to her house to socialize.      Episode of Care Goals: No improvement - ACTION (Actively working towards change); Intervened by reinforcing change plan / affirming steps taken.     Current / Ongoing Stressors and Concerns:   The client reported that her most significant stressors are managing the symptoms of mental illness and physical illness she has been experiencing.     Treatment Objective(s) Addressed in This Session:   The client will learn and apply interpersonal effectiveness skills.     Intervention:   Discussed and reinforced the client's  efforts to invite two friends over to socialize. Obtained a commitment from the client to invite one additional friend over to socialize. Talked at length about the client's ongoing physical health issues.     ASSESSMENT: Current Emotional / Mental Status (status of significant symptoms):   Risk status (Self / Other harm or suicidal ideation)   Client denies current fears or concerns for personal safety.   Client denies current or recent suicidal ideation or behaviors.   Client denies current or recent homicidal ideation or behaviors.   Client denies current or recent self injurious behavior or ideation.   Client denies other safety concerns.   Client reports there has been no change in risk factors since her last session.     Client reports there has been a change in protective factors since her last session. She states that she was able to spend time with two friends over the past three weeks.   Recommended that the client call 911 or go to the local emergency department should there be a change in any of these risk factors.     Appearance:   Appropriate    Eye Contact:   Good    Psychomotor Behavior: Normal    Attitude:   Cooperative    Orientation:   All   Speech    Rate / Production: Normal/ Responsive    Volume:  Normal    Mood:    Slightly Anxious   Affect:    Appropriate    Thought Content:  Clear    Thought Form:  Coherent  Logical    Insight:    Good      Medication Review:   No changes to current psychiatric medication(s).     Medication Compliance:   Yes     Changes in Health Issues:   None reported.     Chemical Use Review:   Substance Use: Chemical use reviewed. No active concerns identified      Tobacco Use: No current tobacco use.      Diagnosis:  1. Generalized anxiety disorder    2. Moderate episode of recurrent major depressive disorder (H)        Collateral Reports Completed:   Not Applicable    PLAN: (Patient Tasks / Therapist Tasks / Other)  Invite one additional friend over to  socialize.        I have reviewed the note as documented above. This accurately captures the substance of my conversation with the client.  TALHA Santamaria.S.ONEYDA., BOB.                                                         ______________________________________________________________________      Treatment Plan     Patient's Name: Bess Enamorado                        YOB: 1975     Date: 4/30/2020     DSM5 Diagnoses:      296.32 (F33.1) Major Depressive Disorder, Recurrent Episode, Moderate  300.02 (F41.1) Generalized Anxiety Disorder     Psychosocial / Contextual Factors: The client is a [age] year old  single female who resides in a home in [place], [place]. The client is currently on a medical leave of absence from her job as a . The client has multiple physical health issues. In addition, she struggles to manage symptoms of major depressive disorder and generalized anxiety disorder.     WHODAS: Not Completed     Referral / Collaboration:  Referral to another professional/service is not indicated at this time.     Anticipated number of session or this episode of care: 12     MeasurableTreatment Goal(s) related to diagnosis / functional impairment(s)  Goal 1: The client will learn and apply skills to manage the symptoms of mental illness she has been experiencing.    I will know I've met my goal when I have less anxiety, more energy and more motivation.       Objective #A    The client will learn and apply mindfulness skills.  Status: New - Date: 4/30/2020      Intervention(s)  Therapist will teach and  the client in learning and applying mindfulness skills.     Objective #B  The client will learn and apply distress tolerance skills.  Status: New - Date: 4/30/2020      Intervention(s)  Therapist will teach and  the client in learning and applying distress tolerance skills.     Objective #C  The client will learn and apply emotion regulation  skills.  Status: New - Date: 4/30/2020      Intervention(s)  Therapist will teach and  the client in learning and applying emotion regulation skills.     Objective #D  The client will learn and apply interpersonal effectiveness skills.                    Status: New - Date: 4/30/2020      Intervention(s)  Therapist will teach and  the client in learning and applying interpersonal effectiveness skills.        The client has reviewed and verbally agreed to the above plan.        Becky Mendenhall, M.S.W., L.I.C.S.W.              May 1, 2020

## 2020-07-22 ENCOUNTER — VIRTUAL VISIT (OUTPATIENT)
Dept: PSYCHOLOGY | Facility: CLINIC | Age: 45
End: 2020-07-22
Payer: COMMERCIAL

## 2020-07-22 DIAGNOSIS — F41.1 GENERALIZED ANXIETY DISORDER: Primary | ICD-10-CM

## 2020-07-22 DIAGNOSIS — F33.1 MODERATE EPISODE OF RECURRENT MAJOR DEPRESSIVE DISORDER (H): ICD-10-CM

## 2020-07-22 PROCEDURE — 90834 PSYTX W PT 45 MINUTES: CPT | Mod: GT | Performed by: SOCIAL WORKER

## 2020-07-23 NOTE — PROGRESS NOTES
Progress Note    Patient Name: Bess Enamorado  Date: 7/22/2020       Service Type: Individual      Session Start Time: 12:30 PM  Session End Time: 1:20 PM     Session Length: 50 Minutes    Session #: 10    Attendees: Client      Mode of Communication:  Video Conference via [employer]    Telemedicine Visit: The client's condition can be safely assessed and treated via synchronous audio and visual telemedicine encounter.      Reason for Telemedicine Visit: The clinic was closed to client appointments due to COVID-19.    Originating Site (Patient Location): Client's Home    Distant Site (Provider Location): Provider Remote Setting    Consent:  The client has verbally consented to: the potential risks and benefits of telemedicine (video visit) versus in person care. She agreed that her insurance would be billed. She also agreed to make self-payment for services provided, if needed, and she agreed to take responsibility for payment of non-covered services.      Treatment Plan Last Reviewed: 4/30/2020  PHQ-9 / TALIB-7 : N/A    DATA  Interactive Complexity: No  Crisis: No       Progress Since Last Session (Related to Symptoms / Goals / Homework):   Symptoms: No change : The client reported no change in her symptoms since the previous video visit.     Homework: Did not complete. The client reported that she did not invite one additional friend over to socialize.      Episode of Care Goals: No improvement - ACTION (Actively working towards change); Intervened by reinforcing change plan / affirming steps taken.     Current / Ongoing Stressors and Concerns:   The client reported that her most significant stressors are managing the symptoms of mental illness and physical illness she has been experiencing.     Treatment Objective(s) Addressed in This Session:   The client will learn and apply interpersonal effectiveness skills.     Intervention:   Examined and addressed the barriers to  the client inviting one additional friend to socialize. Obtained a commitment from the client to invite one additional friend over to socialize. Talked at length about the client's ongoing physical health issues.     ASSESSMENT: Current Emotional / Mental Status (status of significant symptoms):   Risk status (Self / Other harm or suicidal ideation)   Client denies current fears or concerns for personal safety.   Client denies current or recent suicidal ideation or behaviors.   Client denies current or recent homicidal ideation or behaviors.   Client denies current or recent self injurious behavior or ideation.   Client denies other safety concerns.   Client reports there has been no change in risk factors since her last session.     Client reports there has been no change in protective factors since her last session.    Recommended that the client call 911 or go to the local emergency department should there be a change in any of these risk factors.     Appearance:   Appropriate    Eye Contact:   Good    Psychomotor Behavior: Normal    Attitude:   Cooperative    Orientation:   All   Speech    Rate / Production: Normal/ Responsive    Volume:  Normal    Mood:    Slightly Anxious   Affect:    Appropriate    Thought Content:  Clear    Thought Form:  Coherent  Logical    Insight:    Good      Medication Review:   No changes to current psychiatric medication(s).     Medication Compliance:   Yes     Changes in Health Issues:   None reported.     Chemical Use Review:   Substance Use: Chemical use reviewed. No active concerns identified      Tobacco Use: No current tobacco use.      Diagnosis:  1. Generalized anxiety disorder    2. Moderate episode of recurrent major depressive disorder (H)        Collateral Reports Completed:   Not Applicable    PLAN: (Patient Tasks / Therapist Tasks / Other)  Invite one additional friend over to socialize.        I have reviewed the note as documented above. This accurately captures the  substance of my conversation with the client.  CHARLES Santamaria., BOB.                                                         ______________________________________________________________________      Treatment Plan     Patient's Name: Bess Enamorado                        YOB: 1975     Date: 4/30/2020     DSM5 Diagnoses:      296.32 (F33.1) Major Depressive Disorder, Recurrent Episode, Moderate  300.02 (F41.1) Generalized Anxiety Disorder     Psychosocial / Contextual Factors: The client is a [age] year old  single female who resides in a home in [place], [place]. The client is currently on a medical leave of absence from her job as a . The client has multiple physical health issues. In addition, she struggles to manage symptoms of major depressive disorder and generalized anxiety disorder.     WHODAS: Not Completed     Referral / Collaboration:  Referral to another professional/service is not indicated at this time.     Anticipated number of session or this episode of care: 12     MeasurableTreatment Goal(s) related to diagnosis / functional impairment(s)  Goal 1: The client will learn and apply skills to manage the symptoms of mental illness she has been experiencing.    I will know I've met my goal when I have less anxiety, more energy and more motivation.       Objective #A    The client will learn and apply mindfulness skills.  Status: New - Date: 4/30/2020      Intervention(s)  Therapist will teach and  the client in learning and applying mindfulness skills.     Objective #B  The client will learn and apply distress tolerance skills.  Status: New - Date: 4/30/2020      Intervention(s)  Therapist will teach and  the client in learning and applying distress tolerance skills.     Objective #C  The client will learn and apply emotion regulation skills.  Status: New - Date: 4/30/2020      Intervention(s)  Therapist will teach and  the  client in learning and applying emotion regulation skills.     Objective #D  The client will learn and apply interpersonal effectiveness skills.                    Status: New - Date: 4/30/2020      Intervention(s)  Therapist will teach and  the client in learning and applying interpersonal effectiveness skills.        The client has reviewed and verbally agreed to the above plan.        TALHA Santamaria.S.ONEYDA., L.BERTA.TIM.S.W.              May 1, 2020

## 2020-07-29 ENCOUNTER — VIRTUAL VISIT (OUTPATIENT)
Dept: PSYCHOLOGY | Facility: CLINIC | Age: 45
End: 2020-07-29
Payer: COMMERCIAL

## 2020-07-29 DIAGNOSIS — F41.1 GENERALIZED ANXIETY DISORDER: Primary | ICD-10-CM

## 2020-07-29 DIAGNOSIS — F33.1 MAJOR DEPRESSIVE DISORDER, RECURRENT EPISODE, MODERATE (H): ICD-10-CM

## 2020-07-29 PROCEDURE — 90834 PSYTX W PT 45 MINUTES: CPT | Mod: GT | Performed by: SOCIAL WORKER

## 2020-07-29 NOTE — PROGRESS NOTES
Progress Note    Patient Name: Bess Enamorado  Date: 7/29/2020       Service Type: Individual      Session Start Time: 10:30 AM  Session End Time: 11:20 AM     Session Length: 50 Minutes    Session #: 11    Attendees: Client      Mode of Communication:  Video Conference via GreenNote    Telemedicine Visit: The client's condition can be safely assessed and treated via synchronous audio and visual telemedicine encounter.      Reason for Telemedicine Visit: The clinic was closed to client appointments due to COVID-19.    Originating Site (Patient Location): Client's Home    Distant Site (Provider Location): Provider Remote Setting    Consent:  The client has verbally consented to: the potential risks and benefits of telemedicine (video visit) versus in person care. She agreed that her insurance would be billed. She also agreed to make self-payment for services provided, if needed, and she agreed to take responsibility for payment of non-covered services.      Treatment Plan Last Reviewed: 4/30/2020  PHQ-9 / TALIB-7 : N/A    DATA  Interactive Complexity: No  Crisis: No       Progress Since Last Session (Related to Symptoms / Goals / Homework):   Symptoms: No change : The client reported no change in her symptoms since the previous video visit.     Homework: Achieved / completed to satisfaction. The client reported that she did invite one additional friend over to socialize.      Episode of Care Goals: No improvement - ACTION (Actively working towards change); Intervened by reinforcing change plan / affirming steps taken.     Current / Ongoing Stressors and Concerns:   The client reported that her most significant stressors are managing the symptoms of mental illness and physical illness she has been experiencing.     Treatment Objective(s) Addressed in This Session:   The client will learn and apply interpersonal effectiveness skills. The client will learn and apply emotion  regulation skills.     Intervention:   Discussed and reinforced the client's efforts to invite one friend over to socialize. Taked about the client's ongoing physical health issues. Presented didactic information about grief. Supported the cilent while she allowed herself to experience and express grief. Obtained a commitment from the client to continue to allow herself to experience and express grief as needed.     ASSESSMENT: Current Emotional / Mental Status (status of significant symptoms):   Risk status (Self / Other harm or suicidal ideation)   Client denies current fears or concerns for personal safety.   Client denies current or recent suicidal ideation or behaviors.   Client denies current or recent homicidal ideation or behaviors.   Client denies current or recent self injurious behavior or ideation.   Client denies other safety concerns.   Client reports there has been no change in risk factors since her last session.     Client reports there has been no change in protective factors since her last session.    Recommended that the client call 911 or go to the local emergency department should there be a change in any of these risk factors.     Appearance:   Appropriate    Eye Contact:   Good    Psychomotor Behavior: Normal    Attitude:   Cooperative    Orientation:   All   Speech    Rate / Production: Normal/ Responsive    Volume:  Normal    Mood:    Grieving   Affect:    Appropriate    Thought Content:  Clear    Thought Form:  Coherent  Logical    Insight:    Good      Medication Review:   No changes to current psychiatric medication(s).     Medication Compliance:   Yes     Changes in Health Issues:   None reported.     Chemical Use Review:   Substance Use: Chemical use reviewed. No active concerns identified      Tobacco Use: No current tobacco use.      Diagnosis:  1. Generalized anxiety disorder    2. Major depressive disorder, recurrent episode, moderate (H)        Collateral Reports Completed:   Not  Applicable    PLAN: (Patient Tasks / Therapist Tasks / Other)  Continue to allow herself to experience and express grief as needed.        I have reviewed the note as documented above. This accurately captures the substance of my conversation with the client.  CHARLES Santamaria., BOB.                                                         ______________________________________________________________________      Treatment Plan     Patient's Name: Bess Enamorado                        YOB: 1975     Date: 4/30/2020     DSM5 Diagnoses:      296.32 (F33.1) Major Depressive Disorder, Recurrent Episode, Moderate  300.02 (F41.1) Generalized Anxiety Disorder     Psychosocial / Contextual Factors: The client is a [age] year old  single female who resides in a home in [place], [place]. The client is currently on a medical leave of absence from her job as a . The client has multiple physical health issues. In addition, she struggles to manage symptoms of major depressive disorder and generalized anxiety disorder.     WHODAS: Not Completed     Referral / Collaboration:  Referral to another professional/service is not indicated at this time.     Anticipated number of session or this episode of care: 12     MeasurableTreatment Goal(s) related to diagnosis / functional impairment(s)  Goal 1: The client will learn and apply skills to manage the symptoms of mental illness she has been experiencing.    I will know I've met my goal when I have less anxiety, more energy and more motivation.       Objective #A    The client will learn and apply mindfulness skills.  Status: New - Date: 4/30/2020      Intervention(s)  Therapist will teach and  the client in learning and applying mindfulness skills.     Objective #B  The client will learn and apply distress tolerance skills.  Status: New - Date: 4/30/2020      Intervention(s)  Therapist will teach and  the client in  learning and applying distress tolerance skills.     Objective #C  The client will learn and apply emotion regulation skills.  Status: New - Date: 4/30/2020      Intervention(s)  Therapist will teach and  the client in learning and applying emotion regulation skills.     Objective #D  The client will learn and apply interpersonal effectiveness skills.                    Status: New - Date: 4/30/2020      Intervention(s)  Therapist will teach and  the client in learning and applying interpersonal effectiveness skills.        The client has reviewed and verbally agreed to the above plan.        Becky Mendenhall, M.S.W., L.I.C.S.W.              May 1, 2020

## 2020-08-11 ENCOUNTER — PATIENT OUTREACH (OUTPATIENT)
Dept: CARE COORDINATION | Facility: CLINIC | Age: 45
End: 2020-08-11

## 2020-08-11 NOTE — LETTER
[facility] - [facility]  [address] [place] [address]  [phone]    August 11, 2020    Bess Enamorado  [address] [facility] [id number]      Dear Bess,    I have been unsuccessful in reaching you since our last contact. At this time the Care Coordination team will make no further attempts to reach you, however this does not change your ability to continue receiving care from your providers at your primary care clinic. If you need additional support from a care coordinator in the future please contact me at [phone].    All of us at [facility] are invested in your health and are here to assist you in meeting your goals.     Sincerely,    KAY Cotton  [facility] - Care Coordination  [facility]   [phone]

## 2020-08-11 NOTE — PROGRESS NOTES
Clinic Care Coordination Contact  [facility]/Voicemail       Clinical Data: Care Coordinator Outreach  Outreach attempted x 3.  Left message on patient's voicemail with call back information and requested return call.  Plan: Care Coordinator will send disenrollment letter with care coordinator contact information via Enswers. Care Coordinator will do no further outreaches at this time.    KAY Cotton, [facility] - Care Coordinator   [facility]  8/11/2020   10:42 AM  [phone]

## 2020-08-12 ENCOUNTER — VIRTUAL VISIT (OUTPATIENT)
Dept: PSYCHOLOGY | Facility: CLINIC | Age: 45
End: 2020-08-12
Payer: COMMERCIAL

## 2020-08-12 DIAGNOSIS — F33.1 MAJOR DEPRESSIVE DISORDER, RECURRENT EPISODE, MODERATE (H): ICD-10-CM

## 2020-08-12 DIAGNOSIS — F41.1 GENERALIZED ANXIETY DISORDER: Primary | ICD-10-CM

## 2020-08-12 PROCEDURE — 90834 PSYTX W PT 45 MINUTES: CPT | Mod: GT | Performed by: SOCIAL WORKER

## 2020-08-13 NOTE — PROGRESS NOTES
Progress Note    Patient Name: Bess Enamorado  Date: 8/12/2020       Service Type: Individual      Session Start Time: 1:30 PM  Session End Time: 2:20 PM     Session Length: 50 Minutes    Session #: 12    Attendees: Client      Mode of Communication:  Video Conference via ELVPHD    Telemedicine Visit: The client's condition can be safely assessed and treated via synchronous audio and visual telemedicine encounter.      Reason for Telemedicine Visit: The client was only offered a telehealth visit.    Originating Site (Patient Location): Client's Home    Distant Site (Provider Location): Provider Remote Setting    Consent:  The client has verbally consented to the potential risks and benefits of telemedicine (video visit) versus in person care. She agreed that her insurance would be billed. She also agreed to make self-payment for services provided, if needed, and she agreed to take responsibility for payment of non-covered services.      Treatment Plan Last Reviewed: 8/12/2020  PHQ-9 / TALIB-7 : N/A    DATA  Interactive Complexity: No  Crisis: No       Progress Since Last Session (Related to Symptoms / Goals / Homework):   Symptoms: Improving : The client reported that she has improved her ability to tolerate experiencing and expressing grief.    Homework: Achieved / completed to satisfaction. The client reported that she has continued to allow herself to experience and express grief as needed.      Episode of Care Goals: Satisfactory progress - ACTION (Actively working towards change); Intervened by reinforcing change plan / affirming steps taken.     Current / Ongoing Stressors and Concerns:   The client reported that her most significant stressors are managing the symptoms of mental illness and physical illness she has been experiencing.     Treatment Objective(s) Addressed in This Session:   The client will learn and apply emotion regulation skills. The client will  participate in the review and revision of the individual treatment plan.     Intervention:   Discussed and reinforced the client's efforts to allow herself to experience and express grief. Supported the cilent while she continued to allow herself to experience and express grief. Obtained a commitment from the client to continue to allow herself to experience and express grief as needed. Reviewed and revised the individual treatment plan.     ASSESSMENT: Current Emotional / Mental Status (status of significant symptoms):   Risk status (Self / Other harm or suicidal ideation)   Client denies current fears or concerns for personal safety.   Client denies current or recent suicidal ideation or behaviors.   Client denies current or recent homicidal ideation or behaviors.   Client denies current or recent self injurious behavior or ideation.   Client denies other safety concerns.   Client reports there has been no change in risk factors since her last session.     Client reports there has been no change in protective factors since her last session.    Recommended that the client call 911 or go to the local emergency department should there be a change in any of these risk factors.     Appearance:   Appropriate    Eye Contact:   Good    Psychomotor Behavior: Normal    Attitude:   Cooperative    Orientation:   All   Speech    Rate / Production: Normal/ Responsive    Volume:  Normal    Mood:    Grieving   Affect:    Appropriate    Thought Content:  Clear    Thought Form:  Coherent  Logical    Insight:    Good      Medication Review:   No changes to current psychiatric medication(s).     Medication Compliance:   Yes     Changes in Health Issues:   None reported.     Chemical Use Review:   Substance Use: Chemical use reviewed. No active concerns identified      Tobacco Use: No current tobacco use.      Diagnosis:  1. Generalized anxiety disorder    2. Major depressive disorder, recurrent episode, moderate (H)        Collateral  Reports Completed:   Not Applicable    PLAN: (Patient Tasks / Therapist Tasks / Other)  Continue to allow herself to experience and express grief as needed.        I have reviewed the note as documented above. This accurately captures the substance of my conversation with the client.  CHARLES Santamaria., BOB.                                                         ______________________________________________________________________      Treatment Plan     Patient's Name: Bess Enamorado                        YOB: 1975     Date: 8/12/2020     DSM5 Diagnoses:      296.32 (F33.1) Major Depressive Disorder, Recurrent Episode, Moderate  300.02 (F41.1) Generalized Anxiety Disorder     Psychosocial / Contextual Factors: The client is a [age] year old  single female who resides in a home in [place], [place]. The client is currently on a medical leave of absence from her job as a . The client has multiple physical health issues. In addition, she struggles to manage symptoms of major depressive disorder and generalized anxiety disorder.     WHODAS: Not Completed     Referral / Collaboration:  Referral to another professional/service is not indicated at this time.     Anticipated number of session or this episode of care: 12     MeasurableTreatment Goal(s) related to diagnosis / functional impairment(s)  Goal 1: The client will learn and apply skills to manage the symptoms of mental illness she has been experiencing.    I will know I've met my goal when I have less anxiety, more energy and more motivation.       Objective #A    The client will learn and apply mindfulness skills.  Status: Continued - Date(s): 8/12/2020      Intervention(s)  Therapist will teach and  the client in learning and applying mindfulness skills.     Objective #B  The client will learn and apply distress tolerance skills.  Status: Continued - Date(s): 8/122020       Intervention(s)  Therapist will teach and  the client in learning and applying distress tolerance skills.     Objective #C  The client will learn and apply emotion regulation skills.  Status: Continued - Date(s): 8/12/2020      Intervention(s)  Therapist will teach and  the client in learning and applying emotion regulation skills.     Objective #D  The client will learn and apply interpersonal effectiveness skills.                    Status: Continued - Date(s): 8/12/2020      Intervention(s)  Therapist will teach and  the client in learning and applying interpersonal effectiveness skills.        The client has reviewed and verbally agreed to the above plan.        Becky Mendenhall M.S.W., L.I.C.S.W.              August 12, 2020

## 2020-08-19 ENCOUNTER — VIRTUAL VISIT (OUTPATIENT)
Dept: PSYCHOLOGY | Facility: CLINIC | Age: 45
End: 2020-08-19
Payer: COMMERCIAL

## 2020-08-19 DIAGNOSIS — F33.1 MAJOR DEPRESSIVE DISORDER, RECURRENT EPISODE, MODERATE (H): ICD-10-CM

## 2020-08-19 DIAGNOSIS — F41.1 GENERALIZED ANXIETY DISORDER: Primary | ICD-10-CM

## 2020-08-19 PROCEDURE — 90834 PSYTX W PT 45 MINUTES: CPT | Mod: GT | Performed by: SOCIAL WORKER

## 2020-08-20 NOTE — PROGRESS NOTES
Progress Note    Patient Name: Bess Enamorado  Date: 8/19/2020       Service Type: Individual      Session Start Time: 12:30 PM  Session End Time: 1:20 PM     Session Length: 50 Minutes    Session #: 13    Attendees: Client      Mode of Communication:  Video Conference via Tendr    Telemedicine Visit: The client's condition can be safely assessed and treated via synchronous audio and visual telemedicine encounter.      Reason for Telemedicine Visit: The client was only offered a telehealth visit.    Originating Site (Client Location): Client's Home    Distant Site (Provider Location): Provider Remote Setting    Consent:  The client has verbally consented to the potential risks and benefits of telemedicine (video visit) versus in person care. She agreed that her insurance would be billed. She also agreed to make self-payment for services provided, if needed, and she agreed to take responsibility for payment of non-covered services.      Treatment Plan Last Reviewed: 8/12/2020  PHQ-9 / TALIB-7 : N/A    DATA  Interactive Complexity: No  Crisis: No       Progress Since Last Session (Related to Symptoms / Goals / Homework):   Symptoms: Improving : The client reported that she continues to improve her ability to tolerate experiencing and expressing grief.    Homework: Achieved / completed to satisfaction. The client reported that she has continued to allow herself to experience and express grief as needed.      Episode of Care Goals: Satisfactory progress - ACTION (Actively working towards change); Intervened by reinforcing change plan / affirming steps taken.     Current / Ongoing Stressors and Concerns:   The client reported that her most significant stressors are managing the symptoms of mental illness and physical illness she has been experiencing.     Treatment Objective(s) Addressed in This Session:   The client will learn and apply emotion regulation skills. The client  will learn and apply distress tolerance skills.     Intervention:   Discussed and reinforced the client's efforts to allow herself to experience and express grief. Presented didactic information about pain, suffering and radical acceptance. Examined the client's efforts to radically accept her physical limitations. Obtained a commitment from the client to continue to attempt to radically accept her physical limitations.     ASSESSMENT: Current Emotional / Mental Status (status of significant symptoms):   Risk status (Self / Other harm or suicidal ideation)   Client denies current fears or concerns for personal safety.   Client denies current or recent suicidal ideation or behaviors.   Client denies current or recent homicidal ideation or behaviors.   Client denies current or recent self injurious behavior or ideation.   Client denies other safety concerns.   Client reports there has been no change in risk factors since her last session.     Client reports there has been no change in protective factors since her last session.    Recommended that the client call 911 or go to the local emergency department should there be a change in any of these risk factors.     Appearance:   Appropriate    Eye Contact:   Good    Psychomotor Behavior: Normal    Attitude:   Cooperative    Orientation:   All   Speech    Rate / Production: Normal/ Responsive    Volume:  Normal    Mood:    Slightly Anxious   Affect:    Appropriate    Thought Content:  Clear    Thought Form:  Coherent  Logical    Insight:    Good      Medication Review:   No changes to current psychiatric medication(s).     Medication Compliance:   Yes     Changes in Health Issues:   None reported.     Chemical Use Review:   Substance Use: Chemical use reviewed. No active concerns identified      Tobacco Use: No current tobacco use.      Diagnosis:  1. Generalized anxiety disorder    2. Major depressive disorder, recurrent episode, moderate (H)        Collateral Reports  Completed:   Not Applicable    PLAN: (Patient Tasks / Therapist Tasks / Other)  Continue to allow herself to experience and express grief as needed.        I have reviewed the note as documented above. This accurately captures the substance of my conversation with the client.  CHARLES Santamaria., BOB.                                                         ______________________________________________________________________      Treatment Plan     Patient's Name: Bess Enamorado                        YOB: 1975     Date: 8/12/2020     DSM5 Diagnoses:      296.32 (F33.1) Major Depressive Disorder, Recurrent Episode, Moderate  300.02 (F41.1) Generalized Anxiety Disorder     Psychosocial / Contextual Factors: The client is a [age] year old  single female who resides in a home in [place], [place]. The client is currently on a medical leave of absence from her job as a . The client has multiple physical health issues. In addition, she struggles to manage symptoms of major depressive disorder and generalized anxiety disorder.     WHODAS: Not Completed     Referral / Collaboration:  Referral to another professional/service is not indicated at this time.     Anticipated number of session or this episode of care: 12     MeasurableTreatment Goal(s) related to diagnosis / functional impairment(s)  Goal 1: The client will learn and apply skills to manage the symptoms of mental illness she has been experiencing.    I will know I've met my goal when I have less anxiety, more energy and more motivation.       Objective #A    The client will learn and apply mindfulness skills.  Status: Continued - Date(s): 8/12/2020      Intervention(s)  Therapist will teach and  the client in learning and applying mindfulness skills.     Objective #B  The client will learn and apply distress tolerance skills.  Status: Continued - Date(s): 8/122020      Intervention(s)  Therapist  will teach and  the client in learning and applying distress tolerance skills.     Objective #C  The client will learn and apply emotion regulation skills.  Status: Continued - Date(s): 8/12/2020      Intervention(s)  Therapist will teach and  the client in learning and applying emotion regulation skills.     Objective #D  The client will learn and apply interpersonal effectiveness skills.                    Status: Continued - Date(s): 8/12/2020      Intervention(s)  Therapist will teach and  the client in learning and applying interpersonal effectiveness skills.        The client has reviewed and verbally agreed to the above plan.        Becky Mendenhall, M.S.W., L.I.C.S.W.              August 12, 2020

## 2020-08-26 ENCOUNTER — VIRTUAL VISIT (OUTPATIENT)
Dept: PSYCHOLOGY | Facility: CLINIC | Age: 45
End: 2020-08-26
Payer: COMMERCIAL

## 2020-08-26 DIAGNOSIS — F41.1 GENERALIZED ANXIETY DISORDER: Primary | ICD-10-CM

## 2020-08-26 DIAGNOSIS — F33.1 MAJOR DEPRESSIVE DISORDER, RECURRENT EPISODE, MODERATE (H): ICD-10-CM

## 2020-08-26 PROCEDURE — 90834 PSYTX W PT 45 MINUTES: CPT | Mod: GT | Performed by: SOCIAL WORKER

## 2020-08-28 NOTE — PROGRESS NOTES
Progress Note    Patient Name: Bess Enamorado  Date: 8/26/2020       Service Type: Individual      Session Start Time: 10:30 AM  Session End Time: 11:20 AM     Session Length: 50 Minutes    Session #: 14    Attendees: Client      Mode of Communication:  Video Conference via [employer]    Telemedicine Visit: The client's condition can be safely assessed and treated via synchronous audio and visual telemedicine encounter.      Reason for Telemedicine Visit: The client was only offered a telehealth visit.    Originating Site (Client Location): Client's Home    Distant Site (Provider Location): Provider Remote Setting    Consent:  The client has verbally consented to the potential risks and benefits of telemedicine (video visit) versus in person care. She agreed that her insurance would be billed. She also agreed to make self-payment for services provided, if needed, and she agreed to take responsibility for payment of non-covered services.      Treatment Plan Last Reviewed: 8/12/2020  PHQ-9 / TALIB-7 : N/A    DATA  Interactive Complexity: No  Crisis: No       Progress Since Last Session (Related to Symptoms / Goals / Homework):   Symptoms: No change : The client reported no change in her symptoms since the previous video visit.    Homework: Achieved / completed to satisfaction. The client reported that she has continued to allow herself to experience and express grief as needed.      Episode of Care Goals: No improvement - ACTION (Actively working towards change); Intervened by reinforcing change plan / affirming steps taken.     Current / Ongoing Stressors and Concerns:   The client reported that her most significant stressors are managing the symptoms of mental illness and physical illness she has been experiencing.     Treatment Objective(s) Addressed in This Session:   The client will learn and apply emotion regulation skills. The client will learn and apply distress  tolerance skills.     Intervention:   Discussed and reinforced the client's efforts to allow herself to experience and express grief. Talked about the client's efforts to radically accept her physical limitations. Obtained a commitment from the client to continue to attempt to radically accept her physical limitations.       ASSESSMENT: Current Emotional / Mental Status (status of significant symptoms):   Risk status (Self / Other harm or suicidal ideation)   Client denies current fears or concerns for personal safety.   Client denies current or recent suicidal ideation or behaviors.   Client denies current or recent homicidal ideation or behaviors.   Client denies current or recent self injurious behavior or ideation.   Client denies other safety concerns.   Client reports there has been no change in risk factors since her last session.     Client reports there has been no change in protective factors since her last session.    Recommended that the client call 911 or go to the local emergency department should there be a change in any of these risk factors.     Appearance:   Appropriate    Eye Contact:   Good    Psychomotor Behavior: Normal    Attitude:   Cooperative    Orientation:   All   Speech    Rate / Production: Normal/ Responsive    Volume:  Normal    Mood:    Slightly Anxious   Affect:    Appropriate    Thought Content:  Clear    Thought Form:  Coherent  Logical    Insight:    Good      Medication Review:   No changes to current psychiatric medication(s).     Medication Compliance:   Yes     Changes in Health Issues:   None reported.     Chemical Use Review:   Substance Use: Chemical use reviewed. No active concerns identified      Tobacco Use: No current tobacco use.      Diagnosis:  1. Generalized anxiety disorder    2. Major depressive disorder, recurrent episode, moderate (H)        Collateral Reports Completed:   Not Applicable    PLAN: (Patient Tasks / Therapist Tasks / Other)  Continue to attempt to  radically accept her physical limitations.        I have reviewed the note as documented above. This accurately captures the substance of my conversation with the client.  Becky Mendenhall M.S.ONEYDA., BOB.                                                         ______________________________________________________________________      Treatment Plan     Patient's Name: Bess Enamorado                        YOB: 1975     Date: 8/12/2020     DSM5 Diagnoses:      296.32 (F33.1) Major Depressive Disorder, Recurrent Episode, Moderate  300.02 (F41.1) Generalized Anxiety Disorder     Psychosocial / Contextual Factors: The client is a [age] year old  single female who resides in a home in [place], [place]. The client is currently on a medical leave of absence from her job as a . The client has multiple physical health issues. In addition, she struggles to manage symptoms of major depressive disorder and generalized anxiety disorder.     WHODAS: Not Completed     Referral / Collaboration:  Referral to another professional/service is not indicated at this time.     Anticipated number of session or this episode of care: 12     MeasurableTreatment Goal(s) related to diagnosis / functional impairment(s)  Goal 1: The client will learn and apply skills to manage the symptoms of mental illness she has been experiencing.    I will know I've met my goal when I have less anxiety, more energy and more motivation.       Objective #A    The client will learn and apply mindfulness skills.  Status: Continued - Date(s): 8/12/2020      Intervention(s)  Therapist will teach and  the client in learning and applying mindfulness skills.     Objective #B  The client will learn and apply distress tolerance skills.  Status: Continued - Date(s): 8/122020      Intervention(s)  Therapist will teach and  the client in learning and applying distress tolerance skills.     Objective #C  The  client will learn and apply emotion regulation skills.  Status: Continued - Date(s): 8/12/2020      Intervention(s)  Therapist will teach and  the client in learning and applying emotion regulation skills.     Objective #D  The client will learn and apply interpersonal effectiveness skills.                    Status: Continued - Date(s): 8/12/2020      Intervention(s)  Therapist will teach and  the client in learning and applying interpersonal effectiveness skills.        The client has reviewed and verbally agreed to the above plan.        Becky Mendenhall, M.S.W., L.I.C.S.W.              August 12, 2020

## 2020-09-16 ENCOUNTER — VIRTUAL VISIT (OUTPATIENT)
Dept: PSYCHOLOGY | Facility: CLINIC | Age: 45
End: 2020-09-16
Payer: COMMERCIAL

## 2020-09-16 DIAGNOSIS — F33.1 MAJOR DEPRESSIVE DISORDER, RECURRENT EPISODE, MODERATE (H): ICD-10-CM

## 2020-09-16 DIAGNOSIS — F41.1 GENERALIZED ANXIETY DISORDER: Primary | ICD-10-CM

## 2020-09-16 PROCEDURE — 90834 PSYTX W PT 45 MINUTES: CPT | Mod: GT | Performed by: SOCIAL WORKER

## 2020-09-17 NOTE — PROGRESS NOTES
Progress Note    Patient Name: Bess Enamorado  Date: 9/17/2020       Service Type: Individual      Session Start Time: 12:30 PM  Session End Time: 1:20 PM     Session Length: 50 Minutes    Session #: 15    Attendees: Client      Mode of Communication:  Video Conference via Flas[employer]alking    Telemedicine Visit: The client's condition can be safely assessed and treated via synchronous audio and visual telemedicine encounter.      Reason for Telemedicine Visit: The client was only offered a telehealth visit.    Originating Site (Client Location): Client's Home    Distant Site (Provider Location): Provider Remote Setting    Consent:  The client has verbally consented to the potential risks and benefits of telemedicine (video visit) versus in person care. She agreed that her insurance would be billed. She also agreed to make self-payment for services provided, if needed, and she agreed to take responsibility for payment of non-covered services.      Treatment Plan Last Reviewed: 8/12/2020  PHQ-9 / TALIB-7 : N/A    DATA  Interactive Complexity: No  Crisis: No       Progress Since Last Session (Related to Symptoms / Goals / Homework):   Symptoms: No change : The client reported no change in her symptoms since the previous video visit.    Homework: Achieved / completed to satisfaction. The client reported that she has continued to attempt to radically accept her physical limitations.      Episode of Care Goals: No improvement - ACTION (Actively working towards change); Intervened by reinforcing change plan / affirming steps taken.     Current / Ongoing Stressors and Concerns:   The client reported that her most significant stressors are managing the symptoms of mental illness and physical illness she has been experiencing.     Treatment Objective(s) Addressed in This Session:   The client will learn and apply distress tolerance skills.      Intervention:   Discussed and reinforced the  client's efforts to radically accept her physical limitations. Obtained a commitment from the client to continue to attempt to radically accept her physical limitations. Talked at length about the client's engagement in emotional eating behavior.        ASSESSMENT: Current Emotional / Mental Status (status of significant symptoms):   Risk status (Self / Other harm or suicidal ideation)   Client denies current fears or concerns for personal safety.   Client denies current or recent suicidal ideation or behaviors.   Client denies current or recent homicidal ideation or behaviors.   Client denies current or recent self injurious behavior or ideation.   Client denies other safety concerns.   Client reports there has been no change in risk factors since her last session.     Client reports there has been no change in protective factors since her last session.    Recommended that the client call 911 or go to the local emergency department should there be a change in any of these risk factors.     Appearance:   Appropriate    Eye Contact:   Good    Psychomotor Behavior: Normal    Attitude:   Cooperative    Orientation:   All   Speech    Rate / Production: Normal/ Responsive    Volume:  Normal    Mood:    Slightly Anxious   Affect:    Appropriate    Thought Content:  Clear    Thought Form:  Coherent  Logical    Insight:    Good      Medication Review:   No changes to current psychiatric medication(s).     Medication Compliance:   Yes     Changes in Health Issues:   None reported.     Chemical Use Review:   Substance Use: Chemical use reviewed. No active concerns identified      Tobacco Use: No current tobacco use.      Diagnosis:  1. Generalized anxiety disorder    2. Major depressive disorder, recurrent episode, moderate (H)        Collateral Reports Completed:   Not Applicable    PLAN: (Patient Tasks / Therapist Tasks / Other)  Continue to attempt to radically accept her physical limitations.        I have reviewed the  note as documented above. This accurately captures the substance of my conversation with the client.  CHARLES Santamaria., BOB.                                                         ______________________________________________________________________      Treatment Plan     Patient's Name: Bess Enamorado                        YOB: 1975     Date: 8/12/2020     DSM5 Diagnoses:      296.32 (F33.1) Major Depressive Disorder, Recurrent Episode, Moderate  300.02 (F41.1) Generalized Anxiety Disorder     Psychosocial / Contextual Factors: The client is a [age] year old  single female who resides in a home in [place], [place]. The client is currently on a medical leave of absence from her job as a . The client has multiple physical health issues. In addition, she struggles to manage symptoms of major depressive disorder and generalized anxiety disorder.     WHODAS: Not Completed     Referral / Collaboration:  Referral to another professional/service is not indicated at this time.     Anticipated number of session or this episode of care: 12     MeasurableTreatment Goal(s) related to diagnosis / functional impairment(s)  Goal 1: The client will learn and apply skills to manage the symptoms of mental illness she has been experiencing.    I will know I've met my goal when I have less anxiety, more energy and more motivation.       Objective #A    The client will learn and apply mindfulness skills.  Status: Continued - Date(s): 8/12/2020      Intervention(s)  Therapist will teach and  the client in learning and applying mindfulness skills.     Objective #B  The client will learn and apply distress tolerance skills.  Status: Continued - Date(s): 8/122020      Intervention(s)  Therapist will teach and  the client in learning and applying distress tolerance skills.     Objective #C  The client will learn and apply emotion regulation skills.  Status:  Continued - Date(s): 8/12/2020      Intervention(s)  Therapist will teach and  the client in learning and applying emotion regulation skills.     Objective #D  The client will learn and apply interpersonal effectiveness skills.                    Status: Continued - Date(s): 8/12/2020      Intervention(s)  Therapist will teach and  the client in learning and applying interpersonal effectiveness skills.        The client has reviewed and verbally agreed to the above plan.        Becky Mendenhall M.S.W., L.I.C.S.W.              August 12, 2020

## 2020-09-23 ENCOUNTER — VIRTUAL VISIT (OUTPATIENT)
Dept: PSYCHOLOGY | Facility: CLINIC | Age: 45
End: 2020-09-23
Payer: COMMERCIAL

## 2020-09-23 DIAGNOSIS — F33.1 MAJOR DEPRESSIVE DISORDER, RECURRENT EPISODE, MODERATE (H): ICD-10-CM

## 2020-09-23 DIAGNOSIS — F41.1 GENERALIZED ANXIETY DISORDER: Primary | ICD-10-CM

## 2020-09-23 PROCEDURE — 90834 PSYTX W PT 45 MINUTES: CPT | Mod: GT | Performed by: SOCIAL WORKER

## 2020-09-24 NOTE — PROGRESS NOTES
Progress Note    Patient Name: Bess Enamorado  Date: 9/23/2020       Service Type: Individual      Session Start Time: 12:30 PM  Session End Time: 1:20 PM     Session Length: 50 Minutes    Session #: 16    Attendees: Client      Mode of Communication:  Video Conference via [employer]    Telemedicine Visit: The client's condition can be safely assessed and treated via synchronous audio and visual telemedicine encounter.      Reason for Telemedicine Visit: The client was only offered a telehealth visit.    Originating Site (Client Location): Client's Home    Distant Site (Provider Location): Provider Remote Setting    Consent:  The client has verbally consented to the potential risks and benefits of telemedicine (video visit) versus in person care. She agreed that her insurance would be billed. She also agreed to make self-payment for services provided, if needed, and she agreed to take responsibility for payment of non-covered services.      Treatment Plan Last Reviewed: 8/12/2020  PHQ-9 / TALIB-7 : N/A    DATA  Interactive Complexity: No  Crisis: No       Progress Since Last Session (Related to Symptoms / Goals / Homework):   Symptoms: No change : The client reported no change in her symptoms since the previous video visit.    Homework: Achieved / completed to satisfaction. The client reported that she has continued to attempt to radically accept her physical limitations.      Episode of Care Goals: No improvement - ACTION (Actively working towards change); Intervened by reinforcing change plan / affirming steps taken.     Current / Ongoing Stressors and Concerns:   The client reported that her most significant stressors are managing the symptoms of mental illness and physical illness she has been experiencing.     Treatment Objective(s) Addressed in This Session:   The client will learn and apply distress tolerance skills.      Intervention:   Discussed and reinforced the  client's efforts to radically accept her physical limitations. Continued talking about the client's engagement in emotional eating behavior. Began completing a pros and cons of engaging in emotional eating behavior and a pros and cons of not engaging in emotional eating behavior. Obtained a commitment from the client to continue completing the pros and cons exercise.       ASSESSMENT: Current Emotional / Mental Status (status of significant symptoms):   Risk status (Self / Other harm or suicidal ideation)   Client denies current fears or concerns for personal safety.   Client denies current or recent suicidal ideation or behaviors.   Client denies current or recent homicidal ideation or behaviors.   Client denies current or recent self injurious behavior or ideation.   Client denies other safety concerns.   Client reports there has been no change in risk factors since her last session.     Client reports there has been no change in protective factors since her last session.    Recommended that the client call 911 or go to the local emergency department should there be a change in any of these risk factors.     Appearance:   Appropriate    Eye Contact:   Good    Psychomotor Behavior: Normal    Attitude:   Cooperative    Orientation:   All   Speech    Rate / Production: Normal/ Responsive    Volume:  Normal    Mood:    Slightly Anxious   Affect:    Appropriate    Thought Content:  Clear    Thought Form:  Coherent  Logical    Insight:    Good      Medication Review:   No changes to current psychiatric medication(s).     Medication Compliance:   Yes     Changes in Health Issues:   None reported.     Chemical Use Review:   Substance Use: Chemical use reviewed. No active concerns identified      Tobacco Use: No current tobacco use.      Diagnosis:  1. Generalized anxiety disorder    2. Major depressive disorder, recurrent episode, moderate (H)        Collateral Reports Completed:   Not Applicable    PLAN: (Patient Tasks /  Therapist Tasks / Other)  Continue completing the pros and cons exercise.         I have reviewed the note as documented above. This accurately captures the substance of my conversation with the client.  Becky Mendenhall M.S.ONEYDA., BOB.                                                         ______________________________________________________________________      Treatment Plan     Patient's Name: Bess Enamorado                        YOB: 1975     Date: 8/12/2020     DSM5 Diagnoses:      296.32 (F33.1) Major Depressive Disorder, Recurrent Episode, Moderate  300.02 (F41.1) Generalized Anxiety Disorder     Psychosocial / Contextual Factors: The client is a [age] year old  single female who resides in a home in [place], [place]. The client is currently on a medical leave of absence from her job as a . The client has multiple physical health issues. In addition, she struggles to manage symptoms of major depressive disorder and generalized anxiety disorder.     WHODAS: Not Completed     Referral / Collaboration:  Referral to another professional/service is not indicated at this time.     Anticipated number of session or this episode of care: 12     MeasurableTreatment Goal(s) related to diagnosis / functional impairment(s)  Goal 1: The client will learn and apply skills to manage the symptoms of mental illness she has been experiencing.    I will know I've met my goal when I have less anxiety, more energy and more motivation.       Objective #A    The client will learn and apply mindfulness skills.  Status: Continued - Date(s): 8/12/2020      Intervention(s)  Therapist will teach and  the client in learning and applying mindfulness skills.     Objective #B  The client will learn and apply distress tolerance skills.  Status: Continued - Date(s): 8/122020      Intervention(s)  Therapist will teach and  the client in learning and applying distress  tolerance skills.     Objective #C  The client will learn and apply emotion regulation skills.  Status: Continued - Date(s): 8/12/2020      Intervention(s)  Therapist will teach and  the client in learning and applying emotion regulation skills.     Objective #D  The client will learn and apply interpersonal effectiveness skills.                    Status: Continued - Date(s): 8/12/2020      Intervention(s)  Therapist will teach and  the client in learning and applying interpersonal effectiveness skills.        The client has reviewed and verbally agreed to the above plan.        Becky Mendenhall, M.S.W., L.I.C.S.W.              August 12, 2020

## 2020-09-30 ENCOUNTER — VIRTUAL VISIT (OUTPATIENT)
Dept: PSYCHOLOGY | Facility: CLINIC | Age: 45
End: 2020-09-30
Payer: COMMERCIAL

## 2020-09-30 DIAGNOSIS — F41.1 GENERALIZED ANXIETY DISORDER: Primary | ICD-10-CM

## 2020-09-30 DIAGNOSIS — F33.1 MAJOR DEPRESSIVE DISORDER, RECURRENT EPISODE, MODERATE (H): ICD-10-CM

## 2020-09-30 PROCEDURE — 90834 PSYTX W PT 45 MINUTES: CPT | Mod: GT | Performed by: SOCIAL WORKER

## 2020-09-30 NOTE — PROGRESS NOTES
Progress Note    Patient Name: Bess Enamorado  Date: 9/30/2020       Service Type: Individual      Session Start Time: 12:30 PM  Session End Time: 1:20 PM     Session Length: 50 Minutes    Session #: 17    Attendees: Client      Mode of Communication:  Video Conference via [employer]    Telemedicine Visit: The client's condition can be safely assessed and treated via synchronous audio and visual telemedicine encounter.      Reason for Telemedicine Visit: The client was only offered a telehealth visit.    Originating Site (Client Location): Client's Home    Distant Site (Provider Location): Provider Remote Setting    Consent:  The client has verbally consented to the potential risks and benefits of telemedicine (video visit) versus in person care. She agreed that her insurance would be billed. She also agreed to make self-payment for services provided, if needed, and she agreed to take responsibility for payment of non-covered services.      Treatment Plan Last Reviewed: 8/12/2020  PHQ-9 / TALIB-7 : N/A    DATA  Interactive Complexity: No  Crisis: No       Progress Since Last Session (Related to Symptoms / Goals / Homework):   Symptoms: No change : The client reported no change in her symptoms since the previous video visit.    Homework: Did not complete. The client reported that she has not continued completing the pros and cons exercise.      Episode of Care Goals: No improvement - CONTEMPLATION (Considering change and yet undecided); Intervened by assessing the negative and positive thinking (ambivalence) about behavior change.     Current / Ongoing Stressors and Concerns:   The client reported that her most significant stressors are managing the symptoms of mental illness and physical illness she has been experiencing.     Treatment Objective(s) Addressed in This Session:   The client will learn and apply distress tolerance skills.      Intervention:   Continued talking  about the client's engagement in emotional eating behavior. Continued completing a pros and cons of engaging in emotional eating behavior and a pros and cons of not engaging in emotional eating behavior. Obtained a commitment from the client to continue completing the pros and cons exercise.       ASSESSMENT: Current Emotional / Mental Status (status of significant symptoms):   Risk status (Self / Other harm or suicidal ideation)   Client denies current fears or concerns for personal safety.   Client denies current or recent suicidal ideation or behaviors.   Client denies current or recent homicidal ideation or behaviors.   Client denies current or recent self injurious behavior or ideation.   Client denies other safety concerns.   Client reports there has been no change in risk factors since her last session.     Client reports there has been no change in protective factors since her last session.    Recommended that the client call 911 or go to the local emergency department should there be a change in any of these risk factors.     Appearance:   Appropriate    Eye Contact:   Good    Psychomotor Behavior: Normal    Attitude:   Cooperative    Orientation:   All   Speech    Rate / Production: Normal/ Responsive    Volume:  Normal    Mood:    Slightly Anxious   Affect:    Appropriate    Thought Content:  Clear    Thought Form:  Coherent  Logical    Insight:    Good      Medication Review:   No changes to current psychiatric medication(s).     Medication Compliance:   Yes     Changes in Health Issues:   None reported.     Chemical Use Review:   Substance Use: Chemical use reviewed. No active concerns identified      Tobacco Use: No current tobacco use.      Diagnosis:  1. Generalized anxiety disorder    2. Major depressive disorder, recurrent episode, moderate (H)        Collateral Reports Completed:   Not Applicable    PLAN: (Patient Tasks / Therapist Tasks / Other)  Continue completing the pros and cons exercise.          I have reviewed the note as documented above. This accurately captures the substance of my conversation with the client.  CHARLES Santamaria., BOB.                                                         ______________________________________________________________________      Treatment Plan     Patient's Name: Bess Enamorado                        YOB: 1975     Date: 8/12/2020     DSM5 Diagnoses:      296.32 (F33.1) Major Depressive Disorder, Recurrent Episode, Moderate  300.02 (F41.1) Generalized Anxiety Disorder     Psychosocial / Contextual Factors: The client is a [age] year old  single female who resides in a home in [place], [place]. The client is currently on a medical leave of absence from her job as a . The client has multiple physical health issues. In addition, she struggles to manage symptoms of major depressive disorder and generalized anxiety disorder.     WHODAS: Not Completed     Referral / Collaboration:  Referral to another professional/service is not indicated at this time.     Anticipated number of session or this episode of care: 12     MeasurableTreatment Goal(s) related to diagnosis / functional impairment(s)  Goal 1: The client will learn and apply skills to manage the symptoms of mental illness she has been experiencing.    I will know I've met my goal when I have less anxiety, more energy and more motivation.       Objective #A    The client will learn and apply mindfulness skills.  Status: Continued - Date(s): 8/12/2020      Intervention(s)  Therapist will teach and  the client in learning and applying mindfulness skills.     Objective #B  The client will learn and apply distress tolerance skills.  Status: Continued - Date(s): 8/122020      Intervention(s)  Therapist will teach and  the client in learning and applying distress tolerance skills.     Objective #C  The client will learn and apply emotion  regulation skills.  Status: Continued - Date(s): 8/12/2020      Intervention(s)  Therapist will teach and  the client in learning and applying emotion regulation skills.     Objective #D  The client will learn and apply interpersonal effectiveness skills.                    Status: Continued - Date(s): 8/12/2020      Intervention(s)  Therapist will teach and  the client in learning and applying interpersonal effectiveness skills.        The client has reviewed and verbally agreed to the above plan.        Becky Mendenhall, M.S.W., L.I.C.S.W.              August 12, 2020

## 2020-10-07 ENCOUNTER — VIRTUAL VISIT (OUTPATIENT)
Dept: PSYCHOLOGY | Facility: CLINIC | Age: 45
End: 2020-10-07
Payer: COMMERCIAL

## 2020-10-07 DIAGNOSIS — F41.1 GENERALIZED ANXIETY DISORDER: Primary | ICD-10-CM

## 2020-10-07 DIAGNOSIS — F33.1 MAJOR DEPRESSIVE DISORDER, RECURRENT EPISODE, MODERATE (H): ICD-10-CM

## 2020-10-07 PROCEDURE — 90834 PSYTX W PT 45 MINUTES: CPT | Mod: GT | Performed by: SOCIAL WORKER

## 2020-10-07 NOTE — PROGRESS NOTES
Progress Note    Patient Name: Bess Enamorado  Date: 10/7/2020       Service Type: Individual      Session Start Time: 8:30 AM  Session End Time: 9:20 AM     Session Length: 50 Minutes    Session #: 18    Attendees: Client      Mode of Communication:  Video Conference via [employer]    Telemedicine Visit: The client's condition can be safely assessed and treated via synchronous audio and visual telemedicine encounter.      Reason for Telemedicine Visit: The client was only offered a telehealth visit.    Originating Site (Client Location): Client's Home    Distant Site (Provider Location): Provider Remote Setting    Consent:  The client has verbally consented to the potential risks and benefits of telemedicine (video visit) versus in person care. She agreed that her insurance would be billed. She also agreed to make self-payment for services provided, if needed, and she agreed to take responsibility for payment of non-covered services.      Treatment Plan Last Reviewed: 8/12/2020  PHQ-9 / TALIB-7 : N/A    DATA  Interactive Complexity: No  Crisis: No       Progress Since Last Session (Related to Symptoms / Goals / Homework):   Symptoms: No change : The client reported no change in her symptoms since the previous video visit.    Homework: Did not complete. The client reported that she has not continued completing the pros and cons exercise.      Episode of Care Goals: No improvement - CONTEMPLATION (Considering change and yet undecided); Intervened by assessing the negative and positive thinking (ambivalence) about behavior change.     Current / Ongoing Stressors and Concerns:   The client reported that her most significant stressors are managing the symptoms of mental illness and physical illness she has been experiencing.     Treatment Objective(s) Addressed in This Session:   The client will learn and apply distress tolerance skills.      Intervention:   Continued talking  about the client's engagement in emotional eating behavior. Continued completing a pros and cons of engaging in emotional eating behavior and a pros and cons of not engaging in emotional eating behavior. Obtained a commitment from the client to continue completing the pros and cons exercise.       ASSESSMENT: Current Emotional / Mental Status (status of significant symptoms):   Risk status (Self / Other harm or suicidal ideation)   Client denies current fears or concerns for personal safety.   Client denies current or recent suicidal ideation or behaviors.   Client denies current or recent homicidal ideation or behaviors.   Client denies current or recent self injurious behavior or ideation.   Client denies other safety concerns.   Client reports there has been no change in risk factors since her last session.     Client reports there has been no change in protective factors since her last session.    Recommended that the client call 911 or go to the local emergency department should there be a change in any of these risk factors.     Appearance:   Appropriate    Eye Contact:   Good    Psychomotor Behavior: Normal    Attitude:   Cooperative    Orientation:   All   Speech    Rate / Production: Normal/ Responsive    Volume:  Normal    Mood:    Slightly Anxious   Affect:    Appropriate    Thought Content:  Clear    Thought Form:  Coherent  Logical    Insight:    Good      Medication Review:   No changes to current psychiatric medication(s).     Medication Compliance:   Yes     Changes in Health Issues:   None reported.     Chemical Use Review:   Substance Use: Chemical use reviewed. No active concerns identified      Tobacco Use: No current tobacco use.      Diagnosis:  1. Generalized anxiety disorder    2. Major depressive disorder, recurrent episode, moderate (H)        Collateral Reports Completed:   Not Applicable    PLAN: (Patient Tasks / Therapist Tasks / Other)  Continue completing the pros and cons exercise.          I have reviewed the note as documented above. This accurately captures the substance of my conversation with the client.  CHARLES Santamaria., BOB.                                                         ______________________________________________________________________      Treatment Plan     Patient's Name: Bess Enamorado                        YOB: 1975     Date: 8/12/2020     DSM5 Diagnoses:      296.32 (F33.1) Major Depressive Disorder, Recurrent Episode, Moderate  300.02 (F41.1) Generalized Anxiety Disorder     Psychosocial / Contextual Factors: The client is a [age] year old  single female who resides in a home in [place], [place]. The client is currently on a medical leave of absence from her job as a . The client has multiple physical health issues. In addition, she struggles to manage symptoms of major depressive disorder and generalized anxiety disorder.     WHODAS: Not Completed     Referral / Collaboration:  Referral to another professional/service is not indicated at this time.     Anticipated number of session or this episode of care: 12     MeasurableTreatment Goal(s) related to diagnosis / functional impairment(s)  Goal 1: The client will learn and apply skills to manage the symptoms of mental illness she has been experiencing.    I will know I've met my goal when I have less anxiety, more energy and more motivation.       Objective #A    The client will learn and apply mindfulness skills.  Status: Continued - Date(s): 8/12/2020      Intervention(s)  Therapist will teach and  the client in learning and applying mindfulness skills.     Objective #B  The client will learn and apply distress tolerance skills.  Status: Continued - Date(s): 8/122020      Intervention(s)  Therapist will teach and  the client in learning and applying distress tolerance skills.     Objective #C  The client will learn and apply emotion  regulation skills.  Status: Continued - Date(s): 8/12/2020      Intervention(s)  Therapist will teach and  the client in learning and applying emotion regulation skills.     Objective #D  The client will learn and apply interpersonal effectiveness skills.                    Status: Continued - Date(s): 8/12/2020      Intervention(s)  Therapist will teach and  the client in learning and applying interpersonal effectiveness skills.        The client has reviewed and verbally agreed to the above plan.        Becky Mendenhall, M.S.W., L.I.C.S.W.              August 12, 2020

## 2020-10-21 ENCOUNTER — VIRTUAL VISIT (OUTPATIENT)
Dept: PSYCHOLOGY | Facility: CLINIC | Age: 45
End: 2020-10-21
Payer: COMMERCIAL

## 2020-10-21 DIAGNOSIS — F33.1 MAJOR DEPRESSIVE DISORDER, RECURRENT EPISODE, MODERATE (H): ICD-10-CM

## 2020-10-21 DIAGNOSIS — F41.1 GENERALIZED ANXIETY DISORDER: Primary | ICD-10-CM

## 2020-10-21 PROCEDURE — 90834 PSYTX W PT 45 MINUTES: CPT | Mod: GT | Performed by: SOCIAL WORKER

## 2020-10-21 NOTE — PROGRESS NOTES
Progress Note    Patient Name: Bess Enamorado  Date: 10/21/2020       Service Type: Individual      Session Start Time: 12:30 PM  Session End Time: 1:20 PM     Session Length: 50 Minutes    Session #: 19    Attendees: Client      Mode of Communication:  Video Conference via [employer]    Telemedicine Visit: The client's condition can be safely assessed and treated via synchronous audio and visual telemedicine encounter.      Reason for Telemedicine Visit: The client was only offered a telehealth visit.    Originating Site (Client Location): Client's Home    Distant Site (Provider Location): Provider Remote Setting    Consent:  The client has verbally consented to the potential risks and benefits of telemedicine (video visit) versus in person care. She agreed that her insurance would be billed. She also agreed to make self-payment for services provided, if needed, and she agreed to take responsibility for payment of non-covered services.      Treatment Plan Last Reviewed: 8/12/2020  PHQ-9 / TALIB-7 : N/A    DATA  Interactive Complexity: No  Crisis: No       Progress Since Last Session (Related to Symptoms / Goals / Homework):   Symptoms: No change : The client reported no change in her symptoms since the previous video visit.    Homework: Did not complete. The client reported that she has not continued completing the pros and cons exercise.      Episode of Care Goals: No improvement - CONTEMPLATION (Considering change and yet undecided); Intervened by assessing the negative and positive thinking (ambivalence) about behavior change.     Current / Ongoing Stressors and Concerns:   The client reported that her most significant stressors are managing the symptoms of mental illness and physical illness she has been experiencing.     Treatment Objective(s) Addressed in This Session:   The client will learn and apply distress tolerance skills.      Intervention:   Continued talking  about the client's engagement in emotional eating behavior. Continued completing a pros and cons of engaging in emotional eating behavior and a pros and cons of not engaging in emotional eating behavior. Obtained a commitment from the client to continue completing the pros and cons exercise.       ASSESSMENT: Current Emotional / Mental Status (status of significant symptoms):   Risk status (Self / Other harm or suicidal ideation)   Client denies current fears or concerns for personal safety.   Client denies current or recent suicidal ideation or behaviors.   Client denies current or recent homicidal ideation or behaviors.   Client denies current or recent self injurious behavior or ideation.   Client denies other safety concerns.   Client reports there has been no change in risk factors since her last session.     Client reports there has been no change in protective factors since her last session.    Recommended that the client call 911 or go to the local emergency department should there be a change in any of these risk factors.     Appearance:   Appropriate    Eye Contact:   Good    Psychomotor Behavior: Normal    Attitude:   Cooperative    Orientation:   All   Speech    Rate / Production: Normal/ Responsive    Volume:  Normal    Mood:    Slightly Anxious   Affect:    Appropriate    Thought Content:  Clear    Thought Form:  Coherent  Logical    Insight:    Good      Medication Review:   No changes to current psychiatric medication(s).     Medication Compliance:   Yes     Changes in Health Issues:   None reported.     Chemical Use Review:   Substance Use: Chemical use reviewed. No active concerns identified      Tobacco Use: No current tobacco use.      Diagnosis:  1. Generalized anxiety disorder    2. Major depressive disorder, recurrent episode, moderate (H)        Collateral Reports Completed:   Not Applicable    PLAN: (Patient Tasks / Therapist Tasks / Other)  Continue completing the pros and cons exercise.          I have reviewed the note as documented above. This accurately captures the substance of my conversation with the client.  CHARLES Santamaria., BOB.                                                         ______________________________________________________________________      Treatment Plan     Patient's Name: Bess Enamorado                        YOB: 1975     Date: 8/12/2020     DSM5 Diagnoses:      296.32 (F33.1) Major Depressive Disorder, Recurrent Episode, Moderate  300.02 (F41.1) Generalized Anxiety Disorder     Psychosocial / Contextual Factors: The client is a [age] year old  single female who resides in a home in [place], [place]. The client is currently on a medical leave of absence from her job as a . The client has multiple physical health issues. In addition, she struggles to manage symptoms of major depressive disorder and generalized anxiety disorder.     WHODAS: Not Completed     Referral / Collaboration:  Referral to another professional/service is not indicated at this time.     Anticipated number of session or this episode of care: 12     MeasurableTreatment Goal(s) related to diagnosis / functional impairment(s)  Goal 1: The client will learn and apply skills to manage the symptoms of mental illness she has been experiencing.    I will know I've met my goal when I have less anxiety, more energy and more motivation.       Objective #A    The client will learn and apply mindfulness skills.  Status: Continued - Date(s): 8/12/2020      Intervention(s)  Therapist will teach and  the client in learning and applying mindfulness skills.     Objective #B  The client will learn and apply distress tolerance skills.  Status: Continued - Date(s): 8/122020      Intervention(s)  Therapist will teach and  the client in learning and applying distress tolerance skills.     Objective #C  The client will learn and apply emotion  regulation skills.  Status: Continued - Date(s): 8/12/2020      Intervention(s)  Therapist will teach and  the client in learning and applying emotion regulation skills.     Objective #D  The client will learn and apply interpersonal effectiveness skills.                    Status: Continued - Date(s): 8/12/2020      Intervention(s)  Therapist will teach and  the client in learning and applying interpersonal effectiveness skills.        The client has reviewed and verbally agreed to the above plan.        Becky Mendenhall, M.S.W., L.I.C.S.W.              August 12, 2020

## 2020-10-28 ENCOUNTER — VIRTUAL VISIT (OUTPATIENT)
Dept: PSYCHOLOGY | Facility: CLINIC | Age: 45
End: 2020-10-28
Payer: COMMERCIAL

## 2020-10-28 DIAGNOSIS — F41.1 GENERALIZED ANXIETY DISORDER: Primary | ICD-10-CM

## 2020-10-28 DIAGNOSIS — F33.1 MAJOR DEPRESSIVE DISORDER, RECURRENT EPISODE, MODERATE (H): ICD-10-CM

## 2020-10-28 PROCEDURE — 90834 PSYTX W PT 45 MINUTES: CPT | Mod: GT | Performed by: SOCIAL WORKER

## 2020-10-29 NOTE — PROGRESS NOTES
Progress Note    Patient Name: Bess Enamorado  Date: 10/28/2020       Service Type: Individual      Session Start Time: 12:30 PM  Session End Time: 1:20 PM     Session Length: 50 Minutes    Session #: 20    Attendees: Client      Mode of Communication:  Video Conference via [employer]    Telemedicine Visit: The client's condition can be safely assessed and treated via synchronous audio and visual telemedicine encounter.      Reason for Telemedicine Visit: The client was only offered a telehealth visit.    Originating Site (Client Location): Client's Home    Distant Site (Provider Location): Provider Remote Setting    Consent:  The client has verbally consented to the potential risks and benefits of telemedicine (video visit) versus in person care. She agreed that her insurance would be billed. She also agreed to make self-payment for services provided, if needed, and she agreed to take responsibility for payment of non-covered services.      Treatment Plan Last Reviewed: 8/12/2020  PHQ-9 / TALIB-7 : N/A    DATA  Interactive Complexity: No  Crisis: No       Progress Since Last Session (Related to Symptoms / Goals / Homework):   Symptoms: Improving : The client reported experiencing some mild situational anxiety.    Homework: Did not complete. The client reported that she has not continued completing the pros and cons exercise.      Episode of Care Goals: Minimal progress - ACTION (Actively working towards change); Intervened by reinforcing change plan / affirming steps taken.     Current / Ongoing Stressors and Concerns:   The client reported that her most significant stressors are managing the symptoms of mental illness and physical illness she has been experiencing.     Treatment Objective(s) Addressed in This Session:   The client will learn and apply mindfulness skills.      Intervention:   Examined the client's avoidance of working on her disability paperwork. Created a  plan for the client to complete more of the disability paperwork. Continued talking about the client's engagement in emotional eating behavior. Discussed the client's lack of physical stamina and the barriers to her improving her stamina. Created a plan for the client to reach out to her sister and brother to schedule a time to clean her garage.     ASSESSMENT: Current Emotional / Mental Status (status of significant symptoms):   Risk status (Self / Other harm or suicidal ideation)   Client denies current fears or concerns for personal safety.   Client denies current or recent suicidal ideation or behaviors.   Client denies current or recent homicidal ideation or behaviors.   Client denies current or recent self injurious behavior or ideation.   Client denies other safety concerns.   Client reports there has been no change in risk factors since her last session.     Client reports there has been no change in protective factors since her last session.    Recommended that the client call 911 or go to the local emergency department should there be a change in any of these risk factors.     Appearance:   Appropriate    Eye Contact:   Good    Psychomotor Behavior: Normal    Attitude:   Cooperative    Orientation:   All   Speech    Rate / Production: Normal/ Responsive    Volume:  Normal    Mood:    Normal   Affect:    Appropriate    Thought Content:  Clear    Thought Form:  Coherent  Logical    Insight:    Good      Medication Review:   No changes to current psychiatric medication(s).     Medication Compliance:   Yes     Changes in Health Issues:   None reported.     Chemical Use Review:   Substance Use: Chemical use reviewed. No active concerns identified      Tobacco Use: No current tobacco use.      Diagnosis:  1. Generalized anxiety disorder    2. Major depressive disorder, recurrent episode, moderate (H)        Collateral Reports Completed:   Not Applicable    PLAN: (Patient Tasks / Therapist Tasks / Other)  Rank  order her medical concerns and continue working on the disability paperwork. Reach out to her sister and brother to schedule a time to clean her garage.        I have reviewed the note as documented above. This accurately captures the substance of my conversation with the client.  TALHA Santamaria.BRAYDEN., BOB.                                                         ______________________________________________________________________      Treatment Plan     Patient's Name: Bess Enamorado                        YOB: 1975     Date: 8/12/2020     DSM5 Diagnoses:      296.32 (F33.1) Major Depressive Disorder, Recurrent Episode, Moderate  300.02 (F41.1) Generalized Anxiety Disorder     Psychosocial / Contextual Factors: The client is a [age] year old  single female who resides in a home in [place], [place]. The client is currently on a medical leave of absence from her job as a . The client has multiple physical health issues. In addition, she struggles to manage symptoms of major depressive disorder and generalized anxiety disorder.     WHODAS: Not Completed     Referral / Collaboration:  Referral to another professional/service is not indicated at this time.     Anticipated number of session or this episode of care: 12     MeasurableTreatment Goal(s) related to diagnosis / functional impairment(s)  Goal 1: The client will learn and apply skills to manage the symptoms of mental illness she has been experiencing.    I will know I've met my goal when I have less anxiety, more energy and more motivation.       Objective #A    The client will learn and apply mindfulness skills.  Status: Continued - Date(s): 8/12/2020      Intervention(s)  Therapist will teach and  the client in learning and applying mindfulness skills.     Objective #B  The client will learn and apply distress tolerance skills.  Status: Continued - Date(s): 8/122020       Intervention(s)  Therapist will teach and  the client in learning and applying distress tolerance skills.     Objective #C  The client will learn and apply emotion regulation skills.  Status: Continued - Date(s): 8/12/2020      Intervention(s)  Therapist will teach and  the client in learning and applying emotion regulation skills.     Objective #D  The client will learn and apply interpersonal effectiveness skills.                    Status: Continued - Date(s): 8/12/2020      Intervention(s)  Therapist will teach and  the client in learning and applying interpersonal effectiveness skills.        The client has reviewed and verbally agreed to the above plan.        Becky Mendenhall M.S.W., L.I.C.S.W.              August 12, 2020

## 2020-10-30 ENCOUNTER — E-VISIT (OUTPATIENT)
Dept: FAMILY MEDICINE | Facility: CLINIC | Age: 45
End: 2020-10-30
Payer: COMMERCIAL

## 2020-10-30 DIAGNOSIS — R10.9 ABDOMINAL DISCOMFORT: Primary | ICD-10-CM

## 2020-10-30 PROCEDURE — 99421 OL DIG E/M SVC 5-10 MIN: CPT | Performed by: FAMILY MEDICINE

## 2020-10-30 NOTE — PATIENT INSTRUCTIONS
Thank you for choosing us for your care. Based on your symptoms and length of illness, I do not think that you need a prescription at this time.  Please follow the care advise I've provided and use the over the counter medications to help relieve your symptoms.   View your full visit summary for details by clicking on the link below.     If you're not feeling better within 2-3 days, please respond to this message and we can consider if a prescription is needed.  You can schedule an appointment right here in [facility], or call [phone]  If the visit is for the same symptoms as your e-visit, we'll refund the cost of your e-visit if seen within seven days.

## 2020-11-04 ENCOUNTER — VIRTUAL VISIT (OUTPATIENT)
Dept: PSYCHOLOGY | Facility: CLINIC | Age: 45
End: 2020-11-04
Payer: COMMERCIAL

## 2020-11-04 DIAGNOSIS — F41.1 GENERALIZED ANXIETY DISORDER: Primary | ICD-10-CM

## 2020-11-04 DIAGNOSIS — F33.1 MAJOR DEPRESSIVE DISORDER, RECURRENT EPISODE, MODERATE (H): ICD-10-CM

## 2020-11-04 PROCEDURE — 90834 PSYTX W PT 45 MINUTES: CPT | Mod: GT | Performed by: SOCIAL WORKER

## 2020-11-06 NOTE — PROGRESS NOTES
Progress Note    Patient Name: Bess Enamorado  Date: 11/4/2020       Service Type: Individual      Session Start Time: 2:30 PM  Session End Time: 3:20 PM     Session Length: 50 Minutes    Session #: 21    Attendees: Client      Mode of Communication:  Video Conference via [employer]    Telemedicine Visit: The client's condition can be safely assessed and treated via synchronous audio and visual telemedicine encounter.      Reason for Telemedicine Visit: The client was only offered a telehealth visit.    Originating Site (Client Location): Client's Home    Distant Site (Provider Location): Provider Remote Setting    Consent:  The client has verbally consented to the potential risks and benefits of telemedicine (video visit) versus in person care. She agreed that her insurance would be billed. She also agreed to make self-payment for services provided, if needed, and she agreed to take responsibility for payment of non-covered services.      Treatment Plan Last Reviewed: 8/12/2020  PHQ-9 / TALIB-7 : N/A    DATA  Interactive Complexity: No  Crisis: No       Progress Since Last Session (Related to Symptoms / Goals / Homework):   Symptoms: Worsening : The client reported experiencing increased symptoms of anxiety since the previous video visit.    Homework: Partially completed. The client reported that she did rank order her medical concerns and she did continue working on the disability paperwork. She indicated that she did not reach out to her sister and brother to schedule a time to clean her garage.      Episode of Care Goals: No improvement - ACTION (Actively working towards change); Intervened by reinforcing change plan / affirming steps taken.     Current / Ongoing Stressors and Concerns:   The client reported that her stress has increased.     Treatment Objective(s) Addressed in This Session:   The client will learn and apply mindfulness skills.       Intervention:   Discussed and reinforced the client's efforts to continue to work on the disability paperwork. Obtained a commitment from the client to make a phone call to get her financially related questions answered. Discussed the client's plan to get lab work done at a clinic. Obtained a commitment from the client to reach out to her sister and brother to see if they could transport her to the clinic.     ASSESSMENT: Current Emotional / Mental Status (status of significant symptoms):   Risk status (Self / Other harm or suicidal ideation)   Client denies current fears or concerns for personal safety.   Client denies current or recent suicidal ideation or behaviors.   Client denies current or recent homicidal ideation or behaviors.   Client denies current or recent self injurious behavior or ideation.   Client denies other safety concerns.   Client reports there has been no change in risk factors since her last session.     Client reports there has been no change in protective factors since her last session.    Recommended that the client call 911 or go to the local emergency department should there be a change in any of these risk factors.     Appearance:   Appropriate    Eye Contact:   Good    Psychomotor Behavior: Normal    Attitude:   Cooperative    Orientation:   All   Speech    Rate / Production: Normal/ Responsive    Volume:  Normal    Mood:    Anxious    Affect:    Appropriate    Thought Content:  Clear    Thought Form:  Coherent  Logical    Insight:    Good      Medication Review:   No changes to current psychiatric medication(s).     Medication Compliance:   Yes     Changes in Health Issues:   None reported.     Chemical Use Review:   Substance Use: Chemical use reviewed. No active concerns identified      Tobacco Use: No current tobacco use.      Diagnosis:  1. Generalized anxiety disorder    2. Major depressive disorder, recurrent episode, moderate (H)        Collateral Reports Completed:   Not  Applicable    PLAN: (Patient Tasks / Therapist Tasks / Other)  Make a phone call to get her financially related questions answered for her disability paperwork. Reach out to her sister and brother to see if they could transport her to the clinic for lab work.        I have reviewed the note as documented above. This accurately captures the substance of my conversation with the client.  CHARLES Santamaria., BOB.                                                         ______________________________________________________________________      Treatment Plan     Patient's Name: Bess Enamorado                        YOB: 1975     Date: 8/12/2020     DSM5 Diagnoses:      296.32 (F33.1) Major Depressive Disorder, Recurrent Episode, Moderate  300.02 (F41.1) Generalized Anxiety Disorder     Psychosocial / Contextual Factors: The client is a [age] year old  single female who resides in a home in [place], [place]. The client is currently on a medical leave of absence from her job as a . The client has multiple physical health issues. In addition, she struggles to manage symptoms of major depressive disorder and generalized anxiety disorder.     WHODAS: Not Completed     Referral / Collaboration:  Referral to another professional/service is not indicated at this time.     Anticipated number of session or this episode of care: 12     MeasurableTreatment Goal(s) related to diagnosis / functional impairment(s)  Goal 1: The client will learn and apply skills to manage the symptoms of mental illness she has been experiencing.    I will know I've met my goal when I have less anxiety, more energy and more motivation.       Objective #A    The client will learn and apply mindfulness skills.  Status: Continued - Date(s): 8/12/2020      Intervention(s)  Therapist will teach and  the client in learning and applying mindfulness skills.     Objective #B  The client will  learn and apply distress tolerance skills.  Status: Continued - Date(s): 8/122020      Intervention(s)  Therapist will teach and  the client in learning and applying distress tolerance skills.     Objective #C  The client will learn and apply emotion regulation skills.  Status: Continued - Date(s): 8/12/2020      Intervention(s)  Therapist will teach and  the client in learning and applying emotion regulation skills.     Objective #D  The client will learn and apply interpersonal effectiveness skills.                    Status: Continued - Date(s): 8/12/2020      Intervention(s)  Therapist will teach and  the client in learning and applying interpersonal effectiveness skills.        The client has reviewed and verbally agreed to the above plan.        Becky Mendenhall, M.S.W., L.I.C.S.W.              August 12, 2020

## 2020-11-18 ENCOUNTER — VIRTUAL VISIT (OUTPATIENT)
Dept: PSYCHOLOGY | Facility: CLINIC | Age: 45
End: 2020-11-18
Payer: COMMERCIAL

## 2020-11-18 DIAGNOSIS — F33.1 MAJOR DEPRESSIVE DISORDER, RECURRENT EPISODE, MODERATE (H): ICD-10-CM

## 2020-11-18 DIAGNOSIS — F41.1 GENERALIZED ANXIETY DISORDER: Primary | ICD-10-CM

## 2020-11-18 PROCEDURE — 90834 PSYTX W PT 45 MINUTES: CPT | Mod: GT | Performed by: SOCIAL WORKER

## 2020-11-20 NOTE — PROGRESS NOTES
Progress Note    Patient Name: Bess Enamorado  Date: 11/18/2020       Service Type: Individual      Session Start Time: 1:30 PM  Session End Time: 2:20 PM     Session Length: 50 Minutes    Session #: 22    Attendees: Client      Mode of Communication:  Video Conference via [employer]    Telemedicine Visit: The client's condition can be safely assessed and treated via synchronous audio and visual telemedicine encounter.      Reason for Telemedicine Visit: The client was only offered a telehealth visit.    Originating Site (Client Location): Client's Home    Distant Site (Provider Location): Provider Remote Setting    Consent:  The client has verbally consented to the potential risks and benefits of telemedicine (video visit) versus in person care. She agreed that her insurance would be billed. She also agreed to make self-payment for services provided, if needed, and she agreed to take responsibility for payment of non-covered services.      Treatment Plan Last Reviewed: 11/18/2020  PHQ-9 / TALIB-7 : N/A    DATA  Interactive Complexity: No  Crisis: No       Progress Since Last Session (Related to Symptoms / Goals / Homework):   Symptoms: No change : The client reported no change in her symptoms of anxiety since the previous video visit.    Homework: Achieved / completed to satisfaction. The client reported that she did make a phone call to get her financially related questions answered for her disability paperwork. She indicated that she also reached out to her sister and her brother to see if they could transport her to the clinic for lab work.      Episode of Care Goals: No improvement - ACTION (Actively working towards change); Intervened by reinforcing change plan / affirming steps taken.     Current / Ongoing Stressors and Concerns:   The client reported that her primary stressors are completing her paperwork and preparing for a trip to complete lab work.     Treatment  Objective(s) Addressed in This Session:   The client will learn and apply mindfulness skills. The client will learn and apply distress tolerance skills.     Intervention:   Discussed and reinforced the client's efforts to continue to work on the disability paperwork. Obtained a commitment from the client to complete the financial section of the [employer] application. Presented didactic information about bilateral stimulation. Coached the client on accessing bilateral stimulation through [employer]. Obtained a commitment from the client to experiment with using bilateral stimulation on YouTube.      ASSESSMENT: Current Emotional / Mental Status (status of significant symptoms):   Risk status (Self / Other harm or suicidal ideation)   Client denies current fears or concerns for personal safety.   Client denies current or recent suicidal ideation or behaviors.   Client denies current or recent homicidal ideation or behaviors.   Client denies current or recent self injurious behavior or ideation.   Client denies other safety concerns.   Client reports there has been no change in risk factors since her last session.     Client reports there has been a change in protective factors since her last session. She states that she will continue to receive her paycheck until February of 2021.   Recommended that the client call 911 or go to the local emergency department should there be a change in any of these risk factors.     Appearance:   Appropriate    Eye Contact:   Good    Psychomotor Behavior: Normal    Attitude:   Cooperative    Orientation:   All   Speech    Rate / Production: Normal/ Responsive    Volume:  Normal    Mood:    Anxious    Affect:    Appropriate    Thought Content:  Clear    Thought Form:  Coherent  Logical    Insight:    Good      Medication Review:   No changes to current psychiatric medication(s).     Medication Compliance:   Yes     Changes in Health Issues:   None reported.     Chemical Use  Review:   Substance Use: Chemical use reviewed. No active concerns identified      Tobacco Use: No current tobacco use.      Diagnosis:  1. Generalized anxiety disorder    2. Major depressive disorder, recurrent episode, moderate (H)      Collateral Reports Completed:   Not Applicable    PLAN: (Patient Tasks / Therapist Tasks / Other)  [place] with using bilateral stimulation on YouTube.        I have reviewed the note as documented above. This accurately captures the substance of my conversation with the client.  TALHA Santamaria.S.ONEYDA., BOB.                                                         ______________________________________________________________________      Treatment Plan     Patient's Name: Bess Enamorado                        YOB: 1975     Date: 11/18/2020     DSM5 Diagnoses:      296.32 (F33.1) Major Depressive Disorder, Recurrent Episode, Moderate  300.02 (F41.1) Generalized Anxiety Disorder     Psychosocial / Contextual Factors: The client is a [age] year old  single female who resides in a home in [place], [place]. The client is currently on a medical leave of absence from her job as a . The client has multiple physical health issues. In addition, she struggles to manage symptoms of major depressive disorder and generalized anxiety disorder.     WHODAS: Not Completed     Referral / Collaboration:  Referral to another professional/service is not indicated at this time.     Anticipated number of session or this episode of care: 12     MeasurableTreatment Goal(s) related to diagnosis / functional impairment(s)  Goal 1: The client will learn and apply skills to manage the symptoms of mental illness she has been experiencing.    I will know I've met my goal when I have less anxiety, more energy and more motivation.       Objective #A    The client will learn and apply mindfulness skills.  Status: Continued - Date(s): 11/18/2020       Intervention(s)  Therapist will teach and  the client in learning and applying mindfulness skills.     Objective #B  The client will learn and apply distress tolerance skills.  Status: Continued - Date(s): 11/18/2020      Intervention(s)  Therapist will teach and  the client in learning and applying distress tolerance skills.     Objective #C  The client will learn and apply emotion regulation skills.  Status: Continued - Date(s): 11/18/2020      Intervention(s)  Therapist will teach and  the client in learning and applying emotion regulation skills.     Objective #D  The client will learn and apply interpersonal effectiveness skills.                    Status: Continued - Date(s): 11/18/2020      Intervention(s)  Therapist will teach and  the client in learning and applying interpersonal effectiveness skills.        The client has verbally agreed to the above plan.        Becky Mendenhall, M.S.W., L.I.C.S.W.              November 19, 2020

## 2020-11-22 ENCOUNTER — HEALTH MAINTENANCE LETTER (OUTPATIENT)
Age: 45
End: 2020-11-22

## 2020-12-02 ENCOUNTER — VIRTUAL VISIT (OUTPATIENT)
Dept: PSYCHOLOGY | Facility: CLINIC | Age: 45
End: 2020-12-02
Payer: COMMERCIAL

## 2020-12-02 DIAGNOSIS — F33.1 MAJOR DEPRESSIVE DISORDER, RECURRENT EPISODE, MODERATE (H): ICD-10-CM

## 2020-12-02 DIAGNOSIS — F41.1 GENERALIZED ANXIETY DISORDER: Primary | ICD-10-CM

## 2020-12-02 PROCEDURE — 90834 PSYTX W PT 45 MINUTES: CPT | Mod: GT | Performed by: SOCIAL WORKER

## 2020-12-03 ENCOUNTER — VIRTUAL VISIT (OUTPATIENT)
Dept: FAMILY MEDICINE | Facility: CLINIC | Age: 45
End: 2020-12-03
Payer: COMMERCIAL

## 2020-12-03 DIAGNOSIS — Z91.81 RISK FOR FALLS: ICD-10-CM

## 2020-12-03 DIAGNOSIS — G35 MS (MULTIPLE SCLEROSIS) (H): ICD-10-CM

## 2020-12-03 DIAGNOSIS — M62.81 GENERALIZED MUSCLE WEAKNESS: ICD-10-CM

## 2020-12-03 DIAGNOSIS — M16.12 PRIMARY OSTEOARTHRITIS OF LEFT HIP: Primary | ICD-10-CM

## 2020-12-03 PROCEDURE — 99214 OFFICE O/P EST MOD 30 MIN: CPT | Mod: GT | Performed by: FAMILY MEDICINE

## 2020-12-03 ASSESSMENT — ANXIETY QUESTIONNAIRES
3. WORRYING TOO MUCH ABOUT DIFFERENT THINGS: SEVERAL DAYS
1. FEELING NERVOUS, ANXIOUS, OR ON EDGE: MORE THAN HALF THE DAYS
GAD7 TOTAL SCORE: 4
7. FEELING AFRAID AS IF SOMETHING AWFUL MIGHT HAPPEN: NOT AT ALL
2. NOT BEING ABLE TO STOP OR CONTROL WORRYING: SEVERAL DAYS
5. BEING SO RESTLESS THAT IT IS HARD TO SIT STILL: NOT AT ALL
6. BECOMING EASILY ANNOYED OR IRRITABLE: NOT AT ALL
IF YOU CHECKED OFF ANY PROBLEMS ON THIS QUESTIONNAIRE, HOW DIFFICULT HAVE THESE PROBLEMS MADE IT FOR YOU TO DO YOUR WORK, TAKE CARE OF THINGS AT HOME, OR GET ALONG WITH OTHER PEOPLE: SOMEWHAT DIFFICULT

## 2020-12-03 ASSESSMENT — PATIENT HEALTH QUESTIONNAIRE - PHQ9
SUM OF ALL RESPONSES TO PHQ QUESTIONS 1-9: 5
5. POOR APPETITE OR OVEREATING: NOT AT ALL

## 2020-12-03 NOTE — PATIENT INSTRUCTIONS
Our Clinic hours are:  Mondays    7:20 am - 7 pm  Tues -  Fri  7:20 am - 5 pm    Clinic Phone: [phone]    The clinic lab opens at 7:30 am Mon - Fri and appointments are required.    [facility]. [phone]  Monday  8 am - 7pm  Tues - Fri 8 am - 5:30 pm

## 2020-12-03 NOTE — PROGRESS NOTES
"Bess Enamorado is a 45 year old female who is being evaluated via a billable video visit.      The patient has been notified of following:     \"This video visit will be conducted via a call between you and your physician/provider. We have found that certain health care needs can be provided without the need for an in-person physical exam.  This service lets us provide the care you need with a video conversation.  If a prescription is necessary we can send it directly to your pharmacy.  If lab work is needed we can place an order for that and you can then stop by our lab to have the test done at a later time.    Video visits are billed at different rates depending on your insurance coverage.  Please reach out to your insurance provider with any questions.    If during the course of the call the physician/provider feels a video visit is not appropriate, you will not be charged for this service.\"    Patient has given verbal consent for Video visit? Yes  How would you like to obtain your AVS? MyChart  If you are dropped from the video visit, the video invite should be resent to: Send to e-mail at: [email or web address]  Will anyone else be joining your video visit? No     Subjective     Bess Enamorado is a 45 year old female who presents today via video visit for the following health issues:    HPI     Chief Complaint   Patient presents with     Referral     Patient is hoping to get a referral for left hip pain. Patient has seen Dr. Cardenas and had a MRI. Patient was told she should get some bone test but never completed.      Patient needs to confirm with me that she still requires her wheelchair, apparently it is rented.  She's only able to take a few steps without the chair.  Unfortunately this has resulted in worsening weakness and disability.      She tells me she has not been out of her home since March 2020.  She stopped getting PT at home about that time as well.     She needs to have some labs done for her MS " through her neurologist.  She is also having progressive pain in her left hip making even sitting at times uncomfortable.  She is mostly wheelchair bound.  Finding cleaning at home to be more difficult, I worry about her safety at home if she's alone and falls, she doesn't feel she would have the strength to get off the floor.       PHQ 3/23/2020 4/20/2020 12/3/2020   PHQ-9 Total Score 3 6 5   Q9: Thoughts of better off dead/self-harm past 2 weeks Not at all Not at all Not at all     Video Start Time: 1420        Review of Systems   Constitutional, HEENT, cardiovascular, pulmonary, gi and gu systems are negative, except as otherwise noted.      Objective           Vitals:  No vitals were obtained today due to virtual visit.    Physical Exam     GENERAL: healthy, alert, no distress and obese  EYES: Eyes grossly normal to inspection.  No discharge or erythema, or obvious scleral/conjunctival abnormalities.  RESP: No audible wheeze, cough, or visible cyanosis.  No visible retractions or increased work of breathing.    SKIN: Visible skin clear. No significant rash, abnormal pigmentation or lesions.  NEURO: Cranial nerves grossly intact.  Mentation and speech appropriate for age.  PSYCH: Mentation appears normal, affect normal/bright, judgement and insight intact, normal speech and appearance well-groomed.              Assessment & Plan     Primary osteoarthritis of left hip  May benefit from another injection of steroids.   homecare PT ordered for strengthening/ROM    - DX Hip/Pelvis/Spine; Future  - CARE COORDINATION REFERRAL  - HOME CARE NURSING REFERRAL  - Orthopedic & Spine  Referral; Future    MS (multiple sclerosis) (H)  Continues to require wheelchair in her home.   - CARE COORDINATION REFERRAL  - HOME CARE NURSING REFERRAL    Generalized muscle weakness  Needs PT  - CARE COORDINATION REFERRAL  - HOME CARE NURSING REFERRAL    Risk for falls  May be a good candidate for a lifeline  - CARE COORDINATION  "REFERRAL  - HOME CARE NURSING REFERRAL     BMI:   Estimated body mass index is 69.26 kg/m  as calculated from the following:    Height as of 3/4/20: 1.6 m (5' 3\").    Weight as of 3/4/20: 177.4 kg (391 lb).                No follow-ups on file.    Mikala Luna MD  [facility]      Video-Visit Details    Type of service:  Video Visit    Video End Time:1445    Originating Location (pt. Location): Home    Distant Location (provider location):  [facility]     Platform used for Video Visit: Moisés          "

## 2020-12-03 NOTE — PROGRESS NOTES
Progress Note    Patient Name: Bess Enamorado  Date: 12/2/2020       Service Type: Individual      Session Start Time: 1:30 PM  Session End Time: 2:20 PM     Session Length: 50 Minutes    Session #: 23    Attendees: Client      Mode of Communication:  Video Conference via [employer]    Telemedicine Visit: The client's condition can be safely assessed and treated via synchronous audio and visual telemedicine encounter.      Reason for Telemedicine Visit: The client was only offered a telehealth visit.    Originating Site (Client Location): Client's Home    Distant Site (Provider Location): Provider Remote Setting    Consent:  The client has verbally consented to the potential risks and benefits of telemedicine (video visit) versus in person care. She agreed that her insurance would be billed. She also agreed to make self-payment for services provided, if needed, and she agreed to take responsibility for payment of non-covered services.      Treatment Plan Last Reviewed: 11/18/2020  PHQ-9 / TALIB-7 : N/A    DATA  Interactive Complexity: No  Crisis: No       Progress Since Last Session (Related to Symptoms / Goals / Homework):   Symptoms: Improving : The client reported experiencing slightly increased ability to relax since experimenting with bilateral stimulation.    Homework: Achieved / completed to satisfaction. The client reported that she has experimented with using bilateral stimulation on YouTube.      Episode of Care Goals: Minimal progress - ACTION (Actively working towards change); Intervened by reinforcing change plan / affirming steps taken.     Current / Ongoing Stressors and Concerns:   The client reported that her primary stress is the physical pain she has been experiencing.     Treatment Objective(s) Addressed in This Session:   The client will learn and apply distress tolerance skills. The client will learn and apply mindfulness skills.       Intervention:   Discussed and reinforced the client's efforts to experiment with using bilateral stimulation on YouTube. Talked about the client's resulting increased ability to relax. Obtained a commitment from the client to continue to experiment with using bilateral stimulation on YouTube. Talked about the client's plans to complete additional disability paperwork. Obtained a commitment from the client to complete the paperwork.      ASSESSMENT: Current Emotional / Mental Status (status of significant symptoms):   Risk status (Self / Other harm or suicidal ideation)   Client denies current fears or concerns for personal safety.   Client denies current or recent suicidal ideation or behaviors.   Client denies current or recent homicidal ideation or behaviors.   Client denies current or recent self injurious behavior or ideation.   Client denies other safety concerns.   Client reports there has been no change in risk factors since her last session.     Client reports there has been no change in protective factors since her last session.    Recommended that the client call 911 or go to the local emergency department should there be a change in any of these risk factors.     Appearance:   Appropriate    Eye Contact:   Good    Psychomotor Behavior: Normal    Attitude:   Cooperative    Orientation:   All   Speech    Rate / Production: Normal/ Responsive    Volume:  Normal    Mood:    Normal   Affect:    Appropriate    Thought Content:  Clear    Thought Form:  Coherent  Logical    Insight:    Good      Medication Review:   No changes to current psychiatric medication(s).     Medication Compliance:   Yes     Changes in Health Issues:   Yes: The client reported that the pain she experiences has increased significantly.     Chemical Use Review:   Substance Use: Chemical use reviewed. No active concerns identified      Tobacco Use: No current tobacco use.      Diagnosis:  1. Generalized anxiety disorder    2. Major  depressive disorder, recurrent episode, moderate (H)      Collateral Reports Completed:   Not Applicable    PLAN: (Patient Tasks / Therapist Tasks / Other)  Continue to experiment with using bilateral stimulation on YouTube. Complete the additional disability paperwork.        I have reviewed the note as documented above. This accurately captures the substance of my conversation with the client.  CHARLES Santamaria., BOB.                                                         ______________________________________________________________________      Treatment Plan     Patient's Name: Bess Enamorado                        YOB: 1975     Date: 11/18/2020     DSM5 Diagnoses:      296.32 (F33.1) Major Depressive Disorder, Recurrent Episode, Moderate  300.02 (F41.1) Generalized Anxiety Disorder     Psychosocial / Contextual Factors: The client is a [age] year old  single female who resides in a home in [place], [place]. The client is currently on a medical leave of absence from her job as a . The client has multiple physical health issues. In addition, she struggles to manage symptoms of major depressive disorder and generalized anxiety disorder.     WHODAS: Not Completed     Referral / Collaboration:  Referral to another professional/service is not indicated at this time.     Anticipated number of session or this episode of care: 12     MeasurableTreatment Goal(s) related to diagnosis / functional impairment(s)  Goal 1: The client will learn and apply skills to manage the symptoms of mental illness she has been experiencing.    I will know I've met my goal when I have less anxiety, more energy and more motivation.       Objective #A    The client will learn and apply mindfulness skills.  Status: Continued - Date(s): 11/18/2020      Intervention(s)  Therapist will teach and  the client in learning and applying mindfulness skills.     Objective #B  The  client will learn and apply distress tolerance skills.  Status: Continued - Date(s): 11/18/2020      Intervention(s)  Therapist will teach and  the client in learning and applying distress tolerance skills.     Objective #C  The client will learn and apply emotion regulation skills.  Status: Continued - Date(s): 11/18/2020      Intervention(s)  Therapist will teach and  the client in learning and applying emotion regulation skills.     Objective #D  The client will learn and apply interpersonal effectiveness skills.                    Status: Continued - Date(s): 11/18/2020      Intervention(s)  Therapist will teach and  the client in learning and applying interpersonal effectiveness skills.        The client has verbally agreed to the above plan.        Becky Mendenhall, M.S.W., L.I.C.S.W.              November 19, 2020

## 2020-12-04 ENCOUNTER — TELEPHONE (OUTPATIENT)
Dept: FAMILY MEDICINE | Facility: CLINIC | Age: 45
End: 2020-12-04

## 2020-12-04 ENCOUNTER — PATIENT OUTREACH (OUTPATIENT)
Dept: NURSING | Facility: CLINIC | Age: 45
End: 2020-12-04
Payer: COMMERCIAL

## 2020-12-04 SDOH — SOCIAL STABILITY: SOCIAL NETWORK: HOW OFTEN DO YOU GET TOGETHER WITH FRIENDS OR RELATIVES?: NEVER

## 2020-12-04 SDOH — SOCIAL STABILITY: SOCIAL NETWORK: ARE YOU MARRIED, WIDOWED, DIVORCED, SEPARATED, NEVER MARRIED, OR LIVING WITH A PARTNER?: NEVER MARRIED

## 2020-12-04 SDOH — SOCIAL STABILITY: SOCIAL NETWORK: IN A TYPICAL WEEK, HOW MANY TIMES DO YOU TALK ON THE PHONE WITH FAMILY, FRIENDS, OR NEIGHBORS?: ONCE A WEEK

## 2020-12-04 SDOH — SOCIAL STABILITY: SOCIAL NETWORK: HOW OFTEN DO YOU ATTEND CHURCH OR RELIGIOUS SERVICES?: NEVER

## 2020-12-04 SDOH — SOCIAL STABILITY: SOCIAL INSECURITY
WITHIN THE LAST YEAR, HAVE YOU BEEN KICKED, HIT, SLAPPED, OR OTHERWISE PHYSICALLY HURT BY YOUR PARTNER OR EX-PARTNER?: NO

## 2020-12-04 SDOH — SOCIAL STABILITY: SOCIAL INSECURITY: WITHIN THE LAST YEAR, HAVE YOU BEEN HUMILIATED OR EMOTIONALLY ABUSED IN OTHER WAYS BY YOUR PARTNER OR EX-PARTNER?: NO

## 2020-12-04 SDOH — SOCIAL STABILITY: SOCIAL INSECURITY
WITHIN THE LAST YEAR, HAVE TO BEEN RAPED OR FORCED TO HAVE ANY KIND OF SEXUAL ACTIVITY BY YOUR PARTNER OR EX-PARTNER?: NO

## 2020-12-04 SDOH — SOCIAL STABILITY: SOCIAL NETWORK: HOW OFTEN DO YOU ATTENT MEETINGS OF THE CLUB OR ORGANIZATION YOU BELONG TO?: NEVER

## 2020-12-04 SDOH — HEALTH STABILITY: MENTAL HEALTH
STRESS IS WHEN SOMEONE FEELS TENSE, NERVOUS, ANXIOUS, OR CAN'T SLEEP AT NIGHT BECAUSE THEIR MIND IS TROUBLED. HOW STRESSED ARE YOU?: ONLY A LITTLE

## 2020-12-04 SDOH — SOCIAL STABILITY: SOCIAL INSECURITY: WITHIN THE LAST YEAR, HAVE YOU BEEN AFRAID OF YOUR PARTNER OR EX-PARTNER?: NO

## 2020-12-04 SDOH — SOCIAL STABILITY: SOCIAL NETWORK
DO YOU BELONG TO ANY CLUBS OR ORGANIZATIONS SUCH AS CHURCH GROUPS UNIONS, FRATERNAL OR ATHLETIC GROUPS, OR SCHOOL GROUPS?: NO

## 2020-12-04 ASSESSMENT — ANXIETY QUESTIONNAIRES: GAD7 TOTAL SCORE: 4

## 2020-12-04 ASSESSMENT — ACTIVITIES OF DAILY LIVING (ADL): DEPENDENT_IADLS:: CLEANING

## 2020-12-04 NOTE — LETTER
[facility] [facility]ARE COORDINATION - [facility]  [address] [place] [address]  [phone]    December 4, 2020    Bess Enamorado  [address] [facility] [address]      Dear Bess,    I am a clinic care coordinator who works with Mikala Luna MD at [facility]. I wanted to thank you for spending the time to talk with me.  Below is a description of clinic care coordination and how I can further assist you.      The clinic care coordination team is made up of a registered nurse,  and community health worker who understand the health care system. The goal of clinic care coordination is to help you manage your health and improve access to the health care system in the most efficient manner. The team can assist you in meeting your health care goals by providing education, coordinating services, strengthening the communication among your providers and supporting you with any resource needs.    Please feel free to contact me at [phone] with any questions or concerns. We are focused on providing you with the highest-quality healthcare experience possible and that all starts with you.     Sincerely,     Sondra Callaway, ONEYDA  [facility] - Care Coordination  [facility]   [phone]

## 2020-12-04 NOTE — TELEPHONE ENCOUNTER
"Patient has a \"rent to purchase\" wheelchair.  Received back in June.  [employer] requesting VO for patient to continue to use this.  Once her contract is up in 4-5 months, patient has option to purchase.  Diagnosis associated to wheelchair is MS.    Per [employer] this is a new process since July this year (RN had never heard of this before).    Routing to provider.  Tasha JOHN MSN, RN      "

## 2020-12-04 NOTE — TELEPHONE ENCOUNTER
Reason for Call: Request for an order or referral:    Order or referral being requested: [employer] calling for verbal orders to continue to use wheelchair that pt got back in June.  Please call [employer] with verbal order.      Date needed: as soon as possible    Has the patient been seen by the PCP for this problem? YES    Additional comments:     Phone number Patient can be reached at:  Other phone number:  [phone]    Best Time:  any    Can we leave a detailed message on this number?  YES    Call taken on 12/4/2020 at 1:21 PM by Jweels Stevens

## 2020-12-04 NOTE — PROGRESS NOTES
Clinic Care Coordination Contact    Clinic Care Coordination Contact  OUTREACH    Referral Information:  Referral Source: PCP    Primary Diagnosis: Congenital Disabilities    Chief Complaint   Patient presents with     Clinic Care Coordination - Initial     sw        Universal Utilization: Potential under utilization due to self-isolation with COVID-19 and mobility concerns.  Clinic Utilization  Difficulty keeping appointments:: No  Compliance Concerns: No  No-Show Concerns: No  No PCP office visit in Past Year: No  Utilization    Last refreshed: 12/3/2020  4:02 PM: Hospital Admissions 1           Last refreshed: 12/3/2020  4:02 PM: ED Visits 1           Last refreshed: 12/3/2020  4:02 PM: No Show Count (past year) 0              Current as of: 12/3/2020  4:02 PM              Clinical Concerns:  Current Medical Concerns: Patient notes pain in knee and ankle.  She also has hip pain and these all limit her mobility.  Current Behavioral Concerns: Patient is working with a counselor and does take medications for her depression.  Right now she feels it is fairly well managed.  Education Provided to patient: Patient notes that depending on how she sits she will have increased pain and stiffness.  She notes her weight and limited mobility along with her MS is all contributing factors.  Patient aware that not moving does not help and potentially hinders her increase in mobility.  Patient does have an order for home care to come out with intent to help her be more mobile.    Patient reports she used to have [employer] that her aunt was paying for and she stopped this.  She is wearing her iWatch and using this as a safety device so she can call if she has any problems.      Pain  Pain (GOAL):: Yes  Type: Chronic (>3mo)  Location of chronic pain:: hip (left), ankle  Progression: Unchanged  Health Maintenance Reviewed: Due/Overdue   Health Maintenance Due   Topic Date Due     HEPATITIS C SCREENING  11/26/1992     PREVENTIVE CARE  VISIT  06/26/2018     DTAP/TDAP/TD IMMUNIZATION (5 - Td) 04/27/2019     INFLUENZA VACCINE (1) 09/01/2020       Clinical Pathway: None    Medication Management:  Patient denied any questions or concerns about medications.    Functional Status:  Dependent ADLs:: Wheelchair-independent  Dependent IADLs:: Cleaning(able yet does get supports - takes longer to do on her own)  Bed or wheelchair confined:: No  Mobility Status: Independent w/Device  Fallen 2 or more times in the past year?: No  Any fall with injury in the past year?: No    Living Situation:  Current living arrangement:: I live alone  Type of residence:: [facility]    Lifestyle & Psychosocial Needs:  Lifestyle     Physical activity     Days per week: 0 days     Minutes per session: 0 min     Stress: Only a little     Social Needs     Financial resource strain: Not hard at all     Food insecurity     Worry: Never true     Inability: Never true     Transportation needs     Medical: No     Non-medical: No     Diet:: Regular  Inadequate nutrition (GOAL):: No  Tube Feeding: No  Inadequate activity/exercise (GOAL):: Yes  Significant changes in sleep pattern (GOAL): Yes  Transportation means:: None     Oriental orthodox or spiritual beliefs that impact treatment:: No  Mental health DX:: Yes  Mental health DX how managed:: Medication, Outpatient Counseling  Mental health management concern (GOAL):: No  Chemical Dependency Status: No Current Concerns  Informal Support system:: Family, Friends   Socioeconomic History     Marital status: Single     Spouse name: Not on file     Number of children: Not on file     Years of education: Not on file     Highest education level: Not on file   Occupational History     Employer: [employer]   Relationships     Social connections     Talks on phone: Once a week     Gets together: Never     Attends Christianity service: Never     Active member of club or organization: No     Attends meetings of clubs or organizations: Never      Relationship status: Never      Intimate partner violence     Fear of current or ex partner: No     Emotionally abused: No     Physically abused: No     Forced sexual activity: No     Tobacco Use     Smoking status: Never Smoker     Smokeless tobacco: Never Used   Substance and Sexual Activity     Alcohol use: Yes     Comment: social     Drug use: No     Sexual activity: Not Currently     Partners: Male     Birth control/protection: Pill          Patient notes that she does have some support from family and friends.  Her main concerns right now are transportation to get to appointments and mobility/pain management.  Patient does have transportation resources and has been trying to find solutions on her own.  At this time she did not want any transportation resources.    Home care will be coming out and the hope is that they will be able to increase her mobility so she can get in and out of vehicles better and transfer better.  Her hope would be that she is able to return to driving.    Patient reports that financially she is doing okay.  She is still getting paid through work and will be transitioning to long-term disability.  They are assisting her with applying for disability.    Due to patient's mobility she does not always get to the commode or toilet in time.  She does have a towel in her wheelchair in case she has an accident.  At the current time she is able to get into the shower and complete necessary hygiene needs.     Resources and Interventions:  Current Resources:   List of home care services:: [facility]  Community Resources: None  Supplies used at home:: Incontinence Supplies  Equipment Currently Used at Home: wheelchair, manual, walker, rolling, grab bar, tub/shower, commode chair  Employment Status: other (see comments), employed full-time(applying for disability))   )    Advance Care Plan/Directive  Advanced Care Plans/Directives on file:: No  Advanced Care Plan/Directive Status:  Considering Options    Referrals Placed: None     Goals:     Since patient is also potentially getting home care as order was placed same time as care coordination order, we will hold off on goals until she has established goals with home care so we are not doubling up on resources.    Outreach Frequency: 2 weeks  Future Appointments              In 5 days Becky Mendenhall LSW M [facility]     In 1 month Becky Mendenhall LSW [facility]     In 1 month Becky Mendenhall LSW M [facility]           Plan: Patient to accept call from home care and set up services.   will out reach in 2 weeks to check on progress and goal set for areas that are not being addressed by home care.  Will send introduction letter via [employer] that has contact information.    KAY Cotton, [facility] - Care Coordinator   [facility]  12/4/2020   10:48 AM  [phone]

## 2020-12-07 ENCOUNTER — TELEPHONE (OUTPATIENT)
Dept: FAMILY MEDICINE | Facility: CLINIC | Age: 45
End: 2020-12-07

## 2020-12-07 NOTE — TELEPHONE ENCOUNTER
[facility] now requests orders and shares plan of care/discharge summaries for some patients through [employer].  Please REPLY TO THIS MESSAGE OR ROUTE BACK TO THE AUTHOR in order to give authorization for orders when needed.  This is considered a verbal order, you will still receive a faxed copy of orders for signature.  Thank you for your assistance in improving collaboration for our patients.    Patient admitted to homecare services 12/5/20, requesting orders for SN visits 1xwkx4 and 4 prn to assess skin integrity, bilateral LE edema, weeping, redness, assess respiratory status, dyspnea, assess pain and educate on pain management, assess and educate on home safety and falls prevention. PT eval and treat for strength, endurance and gait. OT lyphedema eval and treat for bilateral lower extremity edema. Patient declined all other services at this time. Please call with any questions or concerns [phone].    Thank you,  Naila ARGUELLES  [facility]

## 2020-12-08 ENCOUNTER — TELEPHONE (OUTPATIENT)
Dept: FAMILY MEDICINE | Facility: CLINIC | Age: 45
End: 2020-12-08

## 2020-12-09 ENCOUNTER — VIRTUAL VISIT (OUTPATIENT)
Dept: PSYCHOLOGY | Facility: CLINIC | Age: 45
End: 2020-12-09
Payer: COMMERCIAL

## 2020-12-09 DIAGNOSIS — F33.1 MAJOR DEPRESSIVE DISORDER, RECURRENT EPISODE, MODERATE (H): ICD-10-CM

## 2020-12-09 DIAGNOSIS — F41.1 GENERALIZED ANXIETY DISORDER: Primary | ICD-10-CM

## 2020-12-09 PROCEDURE — 90834 PSYTX W PT 45 MINUTES: CPT | Mod: GT | Performed by: SOCIAL WORKER

## 2020-12-09 NOTE — TELEPHONE ENCOUNTER
[facility] now requests orders and shares plan of care/discharge summaries for some patients through [employer].  Please REPLY TO THIS MESSAGE OR ROUTE BACK TO THE AUTHOR in order to give authorization for orders when needed.  This is considered a verbal order, you will still receive a faxed copy of orders for signature.  Thank you for your assistance in improving collaboration for our patients.    ORDER    PT starting week 12/14: 2w1, 1w1 for safety with transfers and strengthening and for training for safety with mobility.    Danna Finn, PT, DPT  [email or web address]  [phone]

## 2020-12-10 NOTE — PROGRESS NOTES
Progress Note    Patient Name: Bess Enamorado  Date: 12/9/2020       Service Type: Individual      Session Start Time: 1:30 PM  Session End Time: 2:20 PM     Session Length: 50 Minutes    Session #: 24    Attendees: Client      Mode of Communication:  Video Conference via [employer]    Telemedicine Visit: The client's condition can be safely assessed and treated via synchronous audio and visual telemedicine encounter.      Reason for Telemedicine Visit: The client was only offered a telehealth visit.    Originating Site (Client Location): Client's Home    Distant Site (Provider Location): Provider Remote Setting    Consent:  The client has verbally consented to the potential risks and benefits of telemedicine (video visit) versus in person care. She agreed that her insurance would be billed. She also agreed to make self-payment for services provided, if needed, and she agreed to take responsibility for payment of non-covered services.      Treatment Plan Last Reviewed: 11/18/2020  PHQ-9 / TALIB-7 : N/A    DATA  Interactive Complexity: No  Crisis: No       Progress Since Last Session (Related to Symptoms / Goals / Homework):   Symptoms: Improving : The client reported experiencing continued improved mood.    Homework: Achieved / completed to satisfaction. The client reported that she has continued to use bilateral stimulation on YouTube. She indicated that she did complete the additional disability paperwork.      Episode of Care Goals: Minimal progress - ACTION (Actively working towards change); Intervened by reinforcing change plan / affirming steps taken.     Current / Ongoing Stressors and Concerns:   The client reported that her primary stress continues to be the physical pain she has been experiencing.     Treatment Objective(s) Addressed in This Session:   The client will learn and apply distress tolerance skills. The client will learn and apply mindfulness skills.       Intervention:   Talked about the physical health services the client is now receiving. Discussed and reinforced the client's efforts to complete the additional disability paperwork. Obtained a commitment from the client to print off and send the remainder of the disability paperwork. Examined the client's increased social interaction. Discussed and reinforced the client's efforts to use bilateral stimulation on YouTube. Talked about the client's resulting increased ability to relax. Obtained a commitment from the client to continue to use bilateral stimulation on YouTube.      ASSESSMENT: Current Emotional / Mental Status (status of significant symptoms):   Risk status (Self / Other harm or suicidal ideation)   Client denies current fears or concerns for personal safety.   Client denies current or recent suicidal ideation or behaviors.   Client denies current or recent homicidal ideation or behaviors.   Client denies current or recent self injurious behavior or ideation.   Client denies other safety concerns.   Client reports there has been no change in risk factors since her last session.     Client reports there has been no change in protective factors since her last session.    Recommended that the client call 911 or go to the local emergency department should there be a change in any of these risk factors.     Appearance:   Appropriate    Eye Contact:   Good    Psychomotor Behavior: Normal    Attitude:   Cooperative    Orientation:   All   Speech    Rate / Production: Normal/ Responsive    Volume:  Normal    Mood:    Normal   Affect:    Appropriate    Thought Content:  Clear    Thought Form:  Coherent  Logical    Insight:    Good      Medication Review:   No changes to current psychiatric medication(s).     Medication Compliance:   Yes     Changes in Health Issues:   None reported.     Chemical Use Review:   Substance Use: Chemical use reviewed. No active concerns identified      Tobacco Use: No current  tobacco use.      Diagnosis:  1. Generalized anxiety disorder    2. Major depressive disorder, recurrent episode, moderate (H)      Collateral Reports Completed:   Not Applicable    PLAN: (Patient Tasks / Therapist Tasks / Other)  Print off and send the remainder of the disability paperwork. Continue to use bilateral stimulation on YouTube.         I have reviewed the note as documented above. This accurately captures the substance of my conversation with the client.  CHARLES Santamaria., BOB.                                                         ______________________________________________________________________      Treatment Plan     Patient's Name: Bess Enamorado                        YOB: 1975     Date: 11/18/2020     DSM5 Diagnoses:      296.32 (F33.1) Major Depressive Disorder, Recurrent Episode, Moderate  300.02 (F41.1) Generalized Anxiety Disorder     Psychosocial / Contextual Factors: The client is a [age] year old  single female who resides in a home in [place], [place]. The client is currently on a medical leave of absence from her job as a . The client has multiple physical health issues. In addition, she struggles to manage symptoms of major depressive disorder and generalized anxiety disorder.     WHODAS: Not Completed     Referral / Collaboration:  Referral to another professional/service is not indicated at this time.     Anticipated number of session or this episode of care: 12     MeasurableTreatment Goal(s) related to diagnosis / functional impairment(s)  Goal 1: The client will learn and apply skills to manage the symptoms of mental illness she has been experiencing.    I will know I've met my goal when I have less anxiety, more energy and more motivation.       Objective #A    The client will learn and apply mindfulness skills.  Status: Continued - Date(s): 11/18/2020      Intervention(s)  Therapist will teach and  the  client in learning and applying mindfulness skills.     Objective #B  The client will learn and apply distress tolerance skills.  Status: Continued - Date(s): 11/18/2020      Intervention(s)  Therapist will teach and  the client in learning and applying distress tolerance skills.     Objective #C  The client will learn and apply emotion regulation skills.  Status: Continued - Date(s): 11/18/2020      Intervention(s)  Therapist will teach and  the client in learning and applying emotion regulation skills.     Objective #D  The client will learn and apply interpersonal effectiveness skills.                    Status: Continued - Date(s): 11/18/2020      Intervention(s)  Therapist will teach and  the client in learning and applying interpersonal effectiveness skills.        The client has verbally agreed to the above plan.        Becky Mendenhall, M.S.W., L.I.C.S.W.              November 19, 2020

## 2020-12-16 ENCOUNTER — TELEPHONE (OUTPATIENT)
Dept: FAMILY MEDICINE | Facility: CLINIC | Age: 45
End: 2020-12-16

## 2020-12-18 DIAGNOSIS — Z53.9 DIAGNOSIS NOT YET DEFINED: Primary | ICD-10-CM

## 2020-12-18 PROCEDURE — G0180 MD CERTIFICATION HHA PATIENT: HCPCS | Performed by: FAMILY MEDICINE

## 2020-12-21 ENCOUNTER — PATIENT OUTREACH (OUTPATIENT)
Dept: FAMILY MEDICINE | Facility: CLINIC | Age: 45
End: 2020-12-21
Payer: COMMERCIAL

## 2020-12-21 NOTE — PROGRESS NOTES
Clinic Care Coordination Contact    Follow Up Progress Note      Assessment: pt has been working with home care and feels she has made progress on her transfers.  She still has trouble getting in/out of her car.      Goals addressed this encounter:   Goals Addressed    None          Intervention/Education provided during outreach: pt said that home care gave her an option for transportation.  She is looking into a vehicle with a lift that they can rent for appointments.  Her family would be able to drive her to her appointments.  She will get her appointments close together so they don't need to rent the vehicle for a long period of time.      Pt said she felt comfortable with her progress and going forward plan.  She declined any needs and did not want to goal set with care coordination as she felt she can address her needs.        Plan:   Pt to call SW if she has future needs.   Care Coordinator will not enroll at this time as pt denied need for any goals/support.    KAY Cotton, [facility] - Care Coordinator   [facility]  12/21/2020   1:09 PM  [phone]

## 2021-01-06 ENCOUNTER — VIRTUAL VISIT (OUTPATIENT)
Dept: PSYCHOLOGY | Facility: CLINIC | Age: 46
End: 2021-01-06
Payer: COMMERCIAL

## 2021-01-06 DIAGNOSIS — F33.1 MAJOR DEPRESSIVE DISORDER, RECURRENT EPISODE, MODERATE (H): ICD-10-CM

## 2021-01-06 DIAGNOSIS — F41.1 GENERALIZED ANXIETY DISORDER: Primary | ICD-10-CM

## 2021-01-06 PROCEDURE — 90834 PSYTX W PT 45 MINUTES: CPT | Mod: GT | Performed by: SOCIAL WORKER

## 2021-01-07 NOTE — PROGRESS NOTES
Progress Note    Patient Name: Bess Enamorado  Date: 1/6/2021       Service Type: Individual      Session Start Time: 1:30 PM  Session End Time: 2:20 PM     Session Length: 50 Minutes    Session #: 25    Attendees: Client      Mode of Communication:  Video Conference via [employer]    Telemedicine Visit: The client's condition can be safely assessed and treated via synchronous audio and visual telemedicine encounter.      Reason for Telemedicine Visit: The client was only offered a telehealth visit.    Originating Site (Client Location): Client's Home    Distant Site (Provider Location): Provider Remote Setting    Consent:  The client has verbally consented to the potential risks and benefits of telemedicine (video visit) versus in person care. She agreed that her insurance would be billed. She also agreed to make self-payment for services provided, if needed, and she agreed to take responsibility for payment of non-covered services.      Treatment Plan Last Reviewed: 11/18/2020  PHQ-9 / TALIB-7 : N/A    DATA  Interactive Complexity: No  Crisis: No       Progress Since Last Session (Related to Symptoms / Goals / Homework):   Symptoms: No change : The client reported no change in her symptoms since the previous video visit.    Homework: Achieved / completed to satisfaction. The client reported that she did print off and send the remainder of the disability paperwork. She indicated that she did continue to use bilateral stimulation on YouTube.       Episode of Care Goals: No improvement - ACTION (Actively working towards change); Intervened by reinforcing change plan / affirming steps taken.     Current / Ongoing Stressors and Concerns:   The client reported that her primary stress continues to be the physical pain she has been experiencing.     Treatment Objective(s) Addressed in This Session:   The client will learn and apply distress tolerance skills.        Intervention:   Talked about the client's past month. Discussed and reinforced the client's efforts to complete her paperwork. Talked about the continued usefulness of bilateral stimulation. Obtained a commitment from the client to continue to use bilateral stimulation on YouTube.      ASSESSMENT: Current Emotional / Mental Status (status of significant symptoms):   Risk status (Self / Other harm or suicidal ideation)   Client denies current fears or concerns for personal safety.   Client denies current or recent suicidal ideation or behaviors.   Client denies current or recent homicidal ideation or behaviors.   Client denies current or recent self injurious behavior or ideation.   Client denies other safety concerns.   Client reports there has been no change in risk factors since her last session.     Client reports there has been no change in protective factors since her last session.    Recommended that the client call 911 or go to the local emergency department should there be a change in any of these risk factors.     Appearance:   Appropriate    Eye Contact:   Good    Psychomotor Behavior: Normal    Attitude:   Cooperative    Orientation:   All   Speech    Rate / Production: Normal/ Responsive    Volume:  Normal    Mood:    Normal   Affect:    Appropriate    Thought Content:  Clear    Thought Form:  Coherent  Logical    Insight:    Good      Medication Review:   No changes to current psychiatric medication(s).     Medication Compliance:   Yes     Changes in Health Issues:   None reported.     Chemical Use Review:   Substance Use: Chemical use reviewed. No active concerns identified      Tobacco Use: No current tobacco use.      Diagnosis:  1. Generalized anxiety disorder    2. Major depressive disorder, recurrent episode, moderate (H)      Collateral Reports Completed:   Not Applicable    PLAN: (Patient Tasks / Therapist Tasks / Other)  Continue to use bilateral stimulation on YouTube.         I have  reviewed the note as documented above. This accurately captures the substance of my conversation with the client.  CHARLES Santamaria., BOB.                                                         ______________________________________________________________________      Treatment Plan     Patient's Name: Bess Enamorado                        YOB: 1975     Date: 11/18/2020     DSM5 Diagnoses:      296.32 (F33.1) Major Depressive Disorder, Recurrent Episode, Moderate  300.02 (F41.1) Generalized Anxiety Disorder     Psychosocial / Contextual Factors: The client is a [age] year old  single female who resides in a home in [place], [place]. The client is currently on a medical leave of absence from her job as a . The client has multiple physical health issues. In addition, she struggles to manage symptoms of major depressive disorder and generalized anxiety disorder.     WHODAS: Not Completed     Referral / Collaboration:  Referral to another professional/service is not indicated at this time.     Anticipated number of session or this episode of care: 12     MeasurableTreatment Goal(s) related to diagnosis / functional impairment(s)  Goal 1: The client will learn and apply skills to manage the symptoms of mental illness she has been experiencing.    I will know I've met my goal when I have less anxiety, more energy and more motivation.       Objective #A    The client will learn and apply mindfulness skills.  Status: Continued - Date(s): 11/18/2020      Intervention(s)  Therapist will teach and  the client in learning and applying mindfulness skills.     Objective #B  The client will learn and apply distress tolerance skills.  Status: Continued - Date(s): 11/18/2020      Intervention(s)  Therapist will teach and  the client in learning and applying distress tolerance skills.     Objective #C  The client will learn and apply emotion regulation  skills.  Status: Continued - Date(s): 11/18/2020      Intervention(s)  Therapist will teach and  the client in learning and applying emotion regulation skills.     Objective #D  The client will learn and apply interpersonal effectiveness skills.                    Status: Continued - Date(s): 11/18/2020      Intervention(s)  Therapist will teach and  the client in learning and applying interpersonal effectiveness skills.        The client has verbally agreed to the above plan.        Becky Mendenhall, M.S.W., L.I.C.S.W.              November 19, 2020

## 2021-01-13 ENCOUNTER — VIRTUAL VISIT (OUTPATIENT)
Dept: PSYCHOLOGY | Facility: CLINIC | Age: 46
End: 2021-01-13
Payer: COMMERCIAL

## 2021-01-13 DIAGNOSIS — F41.1 GENERALIZED ANXIETY DISORDER: Primary | ICD-10-CM

## 2021-01-13 DIAGNOSIS — F33.1 MAJOR DEPRESSIVE DISORDER, RECURRENT EPISODE, MODERATE (H): ICD-10-CM

## 2021-01-13 PROCEDURE — 90834 PSYTX W PT 45 MINUTES: CPT | Mod: GT | Performed by: SOCIAL WORKER

## 2021-01-15 NOTE — PROGRESS NOTES
Progress Note    Patient Name: Bess Enamorado  Date: 1/13/2021       Service Type: Individual      Session Start Time: 2:30 PM  Session End Time: 3:20 PM     Session Length: 50 Minutes    Session #: 26    Attendees: Client      Mode of Communication:  Video Conference via [employer]    Telemedicine Visit: The client's condition can be safely assessed and treated via synchronous audio and visual telemedicine encounter.      Reason for Telemedicine Visit: The client was only offered a telehealth visit.    Originating Site (Client Location): Client's Home    Distant Site (Provider Location): Provider Remote Setting    Consent:  The client has verbally consented to the potential risks and benefits of telemedicine (video visit) versus in person care. She agreed that her insurance would be billed. She also agreed to make self-payment for services provided, if needed, and she agreed to take responsibility for payment of non-covered services.      Treatment Plan Last Reviewed: 11/18/2020  PHQ-9 / TALIB-7 : N/A    DATA  Interactive Complexity: No  Crisis: No       Progress Since Last Session (Related to Symptoms / Goals / Homework):   Symptoms: No change : The client reported no change in her symptoms since the previous video visit.    Homework: Achieved / completed to satisfaction. The client reported that she has continued to use bilateral stimulation on YouTube.       Episode of Care Goals: No improvement - ACTION (Actively working towards change); Intervened by reinforcing change plan / affirming steps taken.     Current / Ongoing Stressors and Concerns:   The client reported that her primary stress continues to be the physical pain she has been experiencing.     Treatment Objective(s) Addressed in This Session:   The client will learn and apply distress tolerance skills.       Intervention:   Talked about the client's past week. Discussed and reinforced the client's efforts to  "read the book, \"Full Catastrophe Living\" .Obtained a commitment from the [facility] to continue reading \"Full Catastrophe Living\". Discussed and reinforced the client's use of bilateral stimulation.      ASSESSMENT: Current Emotional / Mental Status (status of significant symptoms):   Risk status (Self / Other harm or suicidal ideation)   Client denies current fears or concerns for personal safety.   Client denies current or recent suicidal ideation or behaviors.   Client denies current or recent homicidal ideation or behaviors.   Client denies current or recent self injurious behavior or ideation.   Client denies other safety concerns.   Client reports there has been no change in risk factors since her last session.     Client reports there has been no change in protective factors since her last session.    Recommended that the client call 911 or go to the local emergency department should there be a change in any of these risk factors.     Appearance:   Appropriate    Eye Contact:   Good    Psychomotor Behavior: Normal    Attitude:   Cooperative    Orientation:   All   Speech    Rate / Production: Normal/ Responsive    Volume:  Normal    Mood:    Normal   Affect:    Appropriate    Thought Content:  Clear    Thought Form:  Coherent  Logical    Insight:    Good      Medication Review:   No changes to current psychiatric medication(s).     Medication Compliance:   Yes     Changes in Health Issues:   None reported.     Chemical Use Review:   Substance Use: Chemical use reviewed. No active concerns identified      Tobacco Use: No current tobacco use.      Diagnosis:  1. Generalized anxiety disorder    2. Major depressive disorder, recurrent episode, moderate (H)      Collateral Reports Completed:   Not Applicable    PLAN: (Patient Tasks / Therapist Tasks / Other)  Continue reading \"Full Catastrophe Living\".         I have reviewed the note as documented above. This accurately captures the substance of my conversation with " the client.  CHARLES Santamaria., BOB.                                                         ______________________________________________________________________      Treatment Plan     Patient's Name: Bess Enamorado                        YOB: 1975     Date: 11/18/2020     DSM5 Diagnoses:      296.32 (F33.1) Major Depressive Disorder, Recurrent Episode, Moderate  300.02 (F41.1) Generalized Anxiety Disorder     Psychosocial / Contextual Factors: The client is a [age] year old  single female who resides in a home in [place], [place]. The client is currently on a medical leave of absence from her job as a . The client has multiple physical health issues. In addition, she struggles to manage symptoms of major depressive disorder and generalized anxiety disorder.     WHODAS: Not Completed     Referral / Collaboration:  Referral to another professional/service is not indicated at this time.     Anticipated number of session or this episode of care: 12     MeasurableTreatment Goal(s) related to diagnosis / functional impairment(s)  Goal 1: The client will learn and apply skills to manage the symptoms of mental illness she has been experiencing.    I will know I've met my goal when I have less anxiety, more energy and more motivation.       Objective #A    The client will learn and apply mindfulness skills.  Status: Continued - Date(s): 11/18/2020      Intervention(s)  Therapist will teach and  the client in learning and applying mindfulness skills.     Objective #B  The client will learn and apply distress tolerance skills.  Status: Continued - Date(s): 11/18/2020      Intervention(s)  Therapist will teach and  the client in learning and applying distress tolerance skills.     Objective #C  The client will learn and apply emotion regulation skills.  Status: Continued - Date(s): 11/18/2020      Intervention(s)  Therapist will teach and  the  client in learning and applying emotion regulation skills.     Objective #D  The client will learn and apply interpersonal effectiveness skills.                    Status: Continued - Date(s): 11/18/2020      Intervention(s)  Therapist will teach and  the client in learning and applying interpersonal effectiveness skills.        The client has verbally agreed to the above plan.        TALHA Santamaria.[email or web address]., L.I.C.S.W.              November 19, 2020

## 2021-01-20 ENCOUNTER — VIRTUAL VISIT (OUTPATIENT)
Dept: PSYCHOLOGY | Facility: CLINIC | Age: 46
End: 2021-01-20
Payer: COMMERCIAL

## 2021-01-20 DIAGNOSIS — F33.1 MAJOR DEPRESSIVE DISORDER, RECURRENT EPISODE, MODERATE (H): ICD-10-CM

## 2021-01-20 DIAGNOSIS — F41.1 GENERALIZED ANXIETY DISORDER: Primary | ICD-10-CM

## 2021-01-20 PROCEDURE — 90834 PSYTX W PT 45 MINUTES: CPT | Mod: GT | Performed by: SOCIAL WORKER

## 2021-01-21 NOTE — PROGRESS NOTES
"                                           Progress Note    Patient Name: Bess Enamorado  Date: 1/20/2021       Service Type: Individual      Session Start Time: 1:30 PM  Session End Time: 2:15 PM     Session Length: 45 Minutes    Session #: 27    Attendees: Client      Mode of Communication:  Video Conference via [employer]    Telemedicine Visit: The client's condition can be safely assessed and treated via synchronous audio and visual telemedicine encounter.      Reason for Telemedicine Visit: The client was only offered a telehealth visit.    Originating Site (Client Location): Client's Home    Distant Site (Provider Location): Provider Remote Setting    Consent:  The client has verbally consented to the potential risks and benefits of telemedicine (video visit) versus in person care. She agreed that her insurance would be billed. She also agreed to make self-payment for services provided, if needed, and she agreed to take responsibility for payment of non-covered services.      Treatment Plan Last Reviewed: 11/18/2020  PHQ-9 / TALBI-7 : N/A    DATA  Interactive Complexity: No  Crisis: No       Progress Since Last Session (Related to Symptoms / Goals / Homework):   Symptoms: No change : The client reported no change in her symptoms since the previous video visit.    Homework: Achieved / completed to satisfaction. The client reported that she has continued to read \"Full Catastrophe Living\".        Episode of Care Goals: No improvement - ACTION (Actively working towards change); Intervened by reinforcing change plan / affirming steps taken.     Current / Ongoing Stressors and Concerns:   The client reported that her primary stress continues to be the physical pain she has been experiencing.     Treatment Objective(s) Addressed in This Session:   The client will learn and apply distress tolerance skills.       Intervention:   Talked about the client's past week. Discussed and reinforced the client's efforts to read " "the book, \"Full Catastrophe Living\" .Obtained a commitment from the [facility] to continue reading \"Full Catastrophe Living\".       ASSESSMENT: Current Emotional / Mental Status (status of significant symptoms):   Risk status (Self / Other harm or suicidal ideation)   Client denies current fears or concerns for personal safety.   Client denies current or recent suicidal ideation or behaviors.   Client denies current or recent homicidal ideation or behaviors.   Client denies current or recent self injurious behavior or ideation.   Client denies other safety concerns.   Client reports there has been no change in risk factors since her last session.     Client reports there has been no change in protective factors since her last session.    Recommended that the client call 911 or go to the local emergency department should there be a change in any of these risk factors.     Appearance:   Appropriate    Eye Contact:   Good    Psychomotor Behavior: Normal    Attitude:   Cooperative    Orientation:   All   Speech    Rate / Production: Normal/ Responsive    Volume:  Normal    Mood:    Normal   Affect:    Appropriate    Thought Content:  Clear    Thought Form:  Coherent  Logical    Insight:    Good      Medication Review:   No changes to current psychiatric medication(s).     Medication Compliance:   Yes     Changes in Health Issues:   None reported.     Chemical Use Review:   Substance Use: Chemical use reviewed. No active concerns identified      Tobacco Use: No current tobacco use.      Diagnosis:  1. Generalized anxiety disorder    2. Major depressive disorder, recurrent episode, moderate (H)      Collateral Reports Completed:   Not Applicable    PLAN: (Patient Tasks / Therapist Tasks / Other)  Continue reading \"Full Catastrophe Living\".         I have reviewed the note as documented above. This accurately captures the substance of my conversation with the client.  Becky Mendenhall, M.S.W., L.I.C.S.W.                         "                                 ______________________________________________________________________      Treatment Plan     Patient's Name: Bess Enamorado                        YOB: 1975     Date: 11/18/2020     DSM5 Diagnoses:      296.32 (F33.1) Major Depressive Disorder, Recurrent Episode, Moderate  300.02 (F41.1) Generalized Anxiety Disorder     Psychosocial / Contextual Factors: The client is a [age] year old  single female who resides in a home in [place], [place]. The client is currently on a medical leave of absence from her job as a . The client has multiple physical health issues. In addition, she struggles to manage symptoms of major depressive disorder and generalized anxiety disorder.     WHODAS: Not Completed     Referral / Collaboration:  Referral to another professional/service is not indicated at this time.     Anticipated number of session or this episode of care: 12     MeasurableTreatment Goal(s) related to diagnosis / functional impairment(s)  Goal 1: The client will learn and apply skills to manage the symptoms of mental illness she has been experiencing.    I will know I've met my goal when I have less anxiety, more energy and more motivation.       Objective #A    The client will learn and apply mindfulness skills.  Status: Continued - Date(s): 11/18/2020      Intervention(s)  Therapist will teach and  the client in learning and applying mindfulness skills.     Objective #B  The client will learn and apply distress tolerance skills.  Status: Continued - Date(s): 11/18/2020      Intervention(s)  Therapist will teach and  the client in learning and applying distress tolerance skills.     Objective #C  The client will learn and apply emotion regulation skills.  Status: Continued - Date(s): 11/18/2020      Intervention(s)  Therapist will teach and  the client in learning and applying emotion regulation skills.     Objective  #D  The client will learn and apply interpersonal effectiveness skills.                    Status: Continued - Date(s): 11/18/2020      Intervention(s)  Therapist will teach and  the client in learning and applying interpersonal effectiveness skills.        The client has verbally agreed to the above plan.        Becky Mendenhall M.S.ONEYDA., L.I.C.S.W.              November 19, 2020

## 2021-02-11 ENCOUNTER — VIRTUAL VISIT (OUTPATIENT)
Dept: PSYCHOLOGY | Facility: CLINIC | Age: 46
End: 2021-02-11
Payer: COMMERCIAL

## 2021-02-11 DIAGNOSIS — F41.1 GENERALIZED ANXIETY DISORDER: Primary | ICD-10-CM

## 2021-02-11 DIAGNOSIS — F33.1 MAJOR DEPRESSIVE DISORDER, RECURRENT EPISODE, MODERATE (H): ICD-10-CM

## 2021-02-11 PROCEDURE — 90834 PSYTX W PT 45 MINUTES: CPT | Mod: GT | Performed by: SOCIAL WORKER

## 2021-02-13 ENCOUNTER — HEALTH MAINTENANCE LETTER (OUTPATIENT)
Age: 46
End: 2021-02-13

## 2021-02-13 NOTE — PROGRESS NOTES
"                                           Progress Note    Patient Name: Bess Enamorado  Date: 2/11/2021       Service Type: Individual      Session Start Time: 8:30 AM  Session End Time: 9:20 AM     Session Length: 50 Minutes    Session #: 28    Attendees: Client      Mode of Communication:  Video Conference via [employer]    Telemedicine Visit: The client's condition can be safely assessed and treated via synchronous audio and visual telemedicine encounter.      Reason for Telemedicine Visit: The client was only offered a telehealth visit.    Originating Site (Client Location): Client's Home    Distant Site (Provider Location): Provider Remote Setting    Consent:  The client has verbally consented to the potential risks and benefits of telemedicine (video visit) versus in person care. She agreed that her insurance would be billed. She also agreed to make self-payment for services provided, if needed, and she agreed to take responsibility for payment of non-covered services.      Treatment Plan Last Reviewed: 11/18/2020  PHQ-9 / TALIB-7 : N/A    DATA  Interactive Complexity: No  Crisis: No       Progress Since Last Session (Related to Symptoms / Goals / Homework):   Symptoms: No change : The client reported no change in her symptoms since the previous video visit.    Homework: Achieved / completed to satisfaction. The client reported that she has continued to read \"Full Catastrophe Living\".        Episode of Care Goals: No improvement - ACTION (Actively working towards change); Intervened by reinforcing change plan / affirming steps taken.     Current / Ongoing Stressors and Concerns:   The client reported that her primary stress continues to be the physical pain she has been experiencing.     Treatment Objective(s) Addressed in This Session:   The client will learn and apply distress tolerance skills.       Intervention:   Talked about the client's past three weeks. Discussed and reinforced the client's efforts to " "read the book, \"Full Catastrophe Living\" .Obtained a commitment from the [facility] to continue reading \"Full Catastrophe Living\".       ASSESSMENT: Current Emotional / Mental Status (status of significant symptoms):   Risk status (Self / Other harm or suicidal ideation)   Client denies current fears or concerns for personal safety.   Client denies current or recent suicidal ideation or behaviors.   Client denies current or recent homicidal ideation or behaviors.   Client denies current or recent self injurious behavior or ideation.   Client denies other safety concerns.   Client reports there has been no change in risk factors since her last session.     Client reports there has been no change in protective factors since her last session.    Recommended that the client call 911 or go to the local emergency department should there be a change in any of these risk factors.     Appearance:   Appropriate    Eye Contact:   Good    Psychomotor Behavior: Normal    Attitude:   Cooperative    Orientation:   All   Speech    Rate / Production: Normal/ Responsive    Volume:  Normal    Mood:    Normal   Affect:    Appropriate    Thought Content:  Clear    Thought Form:  Coherent  Logical    Insight:    Good      Medication Review:   No changes to current psychiatric medication(s).     Medication Compliance:   Yes     Changes in Health Issues:   None reported.     Chemical Use Review:   Substance Use: Chemical use reviewed. No active concerns identified      Tobacco Use: No current tobacco use.      Diagnosis:  1. Generalized anxiety disorder    2. Major depressive disorder, recurrent episode, moderate (H)      Collateral Reports Completed:   Not Applicable    PLAN: (Patient Tasks / Therapist Tasks / Other)  Continue reading \"Full Catastrophe Living\".         I have reviewed the note as documented above. This accurately captures the substance of my conversation with the client.  Becky Mendenhall, M.S.W., L.I.C.S.W.                    "                                      ______________________________________________________________________      Treatment Plan     Patient's Name: Bess Enamorado                        YOB: 1975     Date: 11/18/2020     DSM5 Diagnoses:      296.32 (F33.1) Major Depressive Disorder, Recurrent Episode, Moderate  300.02 (F41.1) Generalized Anxiety Disorder     Psychosocial / Contextual Factors: The client is a [age] year old  single female who resides in a home in [place], [place]. The client is currently on a medical leave of absence from her job as a . The client has multiple physical health issues. In addition, she struggles to manage symptoms of major depressive disorder and generalized anxiety disorder.     WHODAS: Not Completed     Referral / Collaboration:  Referral to another professional/service is not indicated at this time.     Anticipated number of session or this episode of care: 12     MeasurableTreatment Goal(s) related to diagnosis / functional impairment(s)  Goal 1: The client will learn and apply skills to manage the symptoms of mental illness she has been experiencing.    I will know I've met my goal when I have less anxiety, more energy and more motivation.       Objective #A    The client will learn and apply mindfulness skills.  Status: Continued - Date(s): 11/18/2020      Intervention(s)  Therapist will teach and  the client in learning and applying mindfulness skills.     Objective #B  The client will learn and apply distress tolerance skills.  Status: Continued - Date(s): 11/18/2020      Intervention(s)  Therapist will teach and  the client in learning and applying distress tolerance skills.     Objective #C  The client will learn and apply emotion regulation skills.  Status: Continued - Date(s): 11/18/2020      Intervention(s)  Therapist will teach and  the client in learning and applying emotion regulation  skills.     Objective #D  The client will learn and apply interpersonal effectiveness skills.                    Status: Continued - Date(s): 11/18/2020      Intervention(s)  Therapist will teach and  the client in learning and applying interpersonal effectiveness skills.        The client has verbally agreed to the above plan.        Becky Mendenhall M.S.ONEYDA., L.BERTA.C.S.W.              November 19, 2020

## 2021-02-24 ENCOUNTER — VIRTUAL VISIT (OUTPATIENT)
Dept: PSYCHOLOGY | Facility: CLINIC | Age: 46
End: 2021-02-24
Payer: COMMERCIAL

## 2021-02-24 DIAGNOSIS — F33.1 MAJOR DEPRESSIVE DISORDER, RECURRENT EPISODE, MODERATE (H): ICD-10-CM

## 2021-02-24 DIAGNOSIS — F41.1 GENERALIZED ANXIETY DISORDER: Primary | ICD-10-CM

## 2021-02-24 PROCEDURE — 90834 PSYTX W PT 45 MINUTES: CPT | Mod: 95 | Performed by: SOCIAL WORKER

## 2021-02-26 NOTE — PROGRESS NOTES
"                                           Progress Note    Patient Name: Bess Enamorado  Date: 2/24/2021       Service Type: Individual      Session Start Time: 3:30 PM  Session End Time: 4:20 PM     Session Length: 50 Minutes    Session #: 29    Attendees: Client      Mode of Communication:  Video Conference via [employer]    Telemedicine Visit: The client's condition can be safely assessed and treated via synchronous audio and visual telemedicine encounter.      Reason for Telemedicine Visit: The client was only offered a telehealth visit.    Originating Site (Client Location): Client's Home    Distant Site (Provider Location): Provider Remote Setting    Consent:  The client has verbally consented to the potential risks and benefits of telemedicine (video visit) versus in person care. She agreed that her insurance would be billed. She also agreed to make self-payment for services provided, if needed, and she agreed to take responsibility for payment of non-covered services.      Treatment Plan Last Reviewed: 2/24/2021  PHQ-9 / TALIB-7 : N/A    DATA  Interactive Complexity: No  Crisis: No       Progress Since Last Session (Related to Symptoms / Goals / Homework):   Symptoms: No change : The client reported no change in her symptoms since the previous video visit.    Homework: Did not complete. The client reported that she has not continued to read \"Full Catastrophe Living\".        Episode of Care Goals: No improvement - PREPARATION (Decided to change - considering how); Intervened by negotiating a change plan and determining options / strategies for behavior change, identifying triggers, exploring social supports, and working towards setting a date to begin behavior change.     Current / Ongoing Stressors and Concerns:   The client reported that her primary stress continues to be the physical pain she has been experiencing.     Treatment Objective(s) Addressed in This Session:   The client will learn and apply " emotion regulation skills.       Intervention:   Talked about the client's past two weeks, including the client's increased physical pain. Continued discussing the client's beliefs about herself. Examined possible origins of the beliefs. Obtained a commitment from the client to continue examining the origins of the beliefs about herself.      ASSESSMENT: Current Emotional / Mental Status (status of significant symptoms):   Risk status (Self / Other harm or suicidal ideation)   Client denies current fears or concerns for personal safety.   Client denies current or recent suicidal ideation or behaviors.   Client denies current or recent homicidal ideation or behaviors.   Client denies current or recent self injurious behavior or ideation.   Client denies other safety concerns.   Client reports there has been no change in risk factors since her last session.     Client reports there has been no change in protective factors since her last session.    Recommended that the client call 911 or go to the local emergency department should there be a change in any of these risk factors.     Appearance:   Appropriate    Eye Contact:   Good    Psychomotor Behavior: Normal    Attitude:   Cooperative    Orientation:   All   Speech    Rate / Production: Normal/ Responsive    Volume:  Normal    Mood:    Normal   Affect:    Appropriate    Thought Content:  Clear    Thought Form:  Coherent  Logical    Insight:    Good      Medication Review:   No changes to current psychiatric medication(s).     Medication Compliance:   Yes     Changes in Health Issues:   None reported.     Chemical Use Review:   Substance Use: Chemical use reviewed. No active concerns identified      Tobacco Use: No current tobacco use.      Diagnosis:  1. Generalized anxiety disorder    2. Major depressive disorder, recurrent episode, moderate (H)      Collateral Reports Completed:   Not Applicable    PLAN: (Patient Tasks / Therapist Tasks / Other)  Continue  examining the origins of the beliefs about herself.        I have reviewed the note as documented above. This accurately captures the substance of my conversation with the client.  Becky Mendenhall M.S.ONEYDA., BOB.                                                         ______________________________________________________________________      Treatment Plan     Patient's Name: Bess Enamorado                        YOB: 1975     Date: 2/24/2021     DSM5 Diagnoses:      296.32 (F33.1) Major Depressive Disorder, Recurrent Episode, Moderate  300.02 (F41.1) Generalized Anxiety Disorder     Psychosocial / Contextual Factors: The client is a [age] year old  single female who resides in a home in [place], [place]. The client is currently on a medical leave of absence from her job as a . The client has multiple physical health issues. In addition, she struggles to manage symptoms of major depressive disorder and generalized anxiety disorder.     WHODAS: Not Completed     Referral / Collaboration:  Referral to another professional/service is not indicated at this time.     Anticipated number of session or this episode of care: 12     MeasurableTreatment Goal(s) related to diagnosis / functional impairment(s)  Goal 1: The client will learn and apply skills to manage the symptoms of mental illness she has been experiencing.    I will know I've met my goal when I have less anxiety, more energy and more motivation.       Objective #A    The client will learn and apply mindfulness skills.  Status: Continued - Date(s): 2/24/2021      Intervention(s)  Therapist will teach and  the client in learning and applying mindfulness skills.     Objective #B  The client will learn and apply distress tolerance skills.  Status: Continued - Date(s): 2/24/2021      Intervention(s)  Therapist will teach and  the client in learning and applying distress tolerance skills.     Objective  #C  The client will learn and apply emotion regulation skills.  Status: Continued - Date(s): 2/24/2021      Intervention(s)  Therapist will teach and  the client in learning and applying emotion regulation skills.     Objective #D  The client will learn and apply interpersonal effectiveness skills.                    Status: Continued - Date(s): 2/24/2021      Intervention(s)  Therapist will teach and  the client in learning and applying interpersonal effectiveness skills.        The client has verbally agreed to the above plan.        Becky Mendenhall, M.S.W., L.I.C.S.W.              February 26, 2021

## 2021-03-04 ENCOUNTER — APPOINTMENT (OUTPATIENT)
Dept: GENERAL RADIOLOGY | Facility: CLINIC | Age: 46
DRG: 872 | End: 2021-03-04
Attending: EMERGENCY MEDICINE
Payer: COMMERCIAL

## 2021-03-04 ENCOUNTER — HOSPITAL ENCOUNTER (INPATIENT)
Facility: CLINIC | Age: 46
LOS: 4 days | Discharge: NURSING, REHAB OR LONG-TERM CARE | DRG: 872 | End: 2021-03-08
Attending: EMERGENCY MEDICINE | Admitting: INTERNAL MEDICINE
Payer: COMMERCIAL

## 2021-03-04 DIAGNOSIS — Z11.52 ENCOUNTER FOR SCREENING LABORATORY TESTING FOR SEVERE ACUTE RESPIRATORY SYNDROME CORONAVIRUS 2 (SARS-COV-2): ICD-10-CM

## 2021-03-04 DIAGNOSIS — L03.90 CELLULITIS: ICD-10-CM

## 2021-03-04 DIAGNOSIS — R65.10 SIRS (SYSTEMIC INFLAMMATORY RESPONSE SYNDROME) (H): ICD-10-CM

## 2021-03-04 DIAGNOSIS — L03.115 CELLULITIS OF RIGHT LEG: ICD-10-CM

## 2021-03-04 DIAGNOSIS — R00.0 TACHYCARDIA: ICD-10-CM

## 2021-03-04 DIAGNOSIS — J96.11 CHRONIC RESPIRATORY FAILURE WITH HYPOXIA (H): Primary | ICD-10-CM

## 2021-03-04 DIAGNOSIS — L03.115 CELLULITIS OF RIGHT LOWER EXTREMITY: ICD-10-CM

## 2021-03-04 DIAGNOSIS — E66.01 MORBID OBESITY (H): Chronic | ICD-10-CM

## 2021-03-04 DIAGNOSIS — E66.2 OBESITY HYPOVENTILATION SYNDROME (H): ICD-10-CM

## 2021-03-04 DIAGNOSIS — G35 MULTIPLE SCLEROSIS (H): Chronic | ICD-10-CM

## 2021-03-04 LAB
ALBUMIN SERPL-MCNC: 3.6 G/DL (ref 3.4–5)
ALP SERPL-CCNC: 67 U/L (ref 40–150)
ALT SERPL W P-5'-P-CCNC: 20 U/L (ref 0–50)
ANION GAP SERPL CALCULATED.3IONS-SCNC: 6 MMOL/L (ref 3–14)
AST SERPL W P-5'-P-CCNC: 17 U/L (ref 0–45)
BASE EXCESS BLDV CALC-SCNC: 4.3 MMOL/L
BASOPHILS # BLD AUTO: 0.1 10E9/L (ref 0–0.2)
BASOPHILS NFR BLD AUTO: 0.5 %
BILIRUB SERPL-MCNC: 1.7 MG/DL (ref 0.2–1.3)
BUN SERPL-MCNC: 15 MG/DL (ref 7–30)
CALCIUM SERPL-MCNC: 9.5 MG/DL (ref 8.5–10.1)
CHLORIDE SERPL-SCNC: 104 MMOL/L (ref 94–109)
CO2 SERPL-SCNC: 28 MMOL/L (ref 20–32)
CREAT SERPL-MCNC: 0.69 MG/DL (ref 0.52–1.04)
DIFFERENTIAL METHOD BLD: ABNORMAL
EOSINOPHIL # BLD AUTO: 0.1 10E9/L (ref 0–0.7)
EOSINOPHIL NFR BLD AUTO: 0.7 %
ERYTHROCYTE [DISTWIDTH] IN BLOOD BY AUTOMATED COUNT: 15.5 % (ref 10–15)
FLUAV RNA RESP QL NAA+PROBE: NEGATIVE
FLUBV RNA RESP QL NAA+PROBE: NEGATIVE
GFR SERPL CREATININE-BSD FRML MDRD: >90 ML/MIN/{1.73_M2}
GLUCOSE SERPL-MCNC: 110 MG/DL (ref 70–99)
HCO3 BLDV-SCNC: 32 MMOL/L (ref 21–28)
HCT VFR BLD AUTO: 47.4 % (ref 35–47)
HGB BLD-MCNC: 14.3 G/DL (ref 11.7–15.7)
IMM GRANULOCYTES # BLD: 0.1 10E9/L (ref 0–0.4)
IMM GRANULOCYTES NFR BLD: 0.5 %
LABORATORY COMMENT REPORT: NORMAL
LACTATE BLD-SCNC: 1.3 MMOL/L (ref 0.7–2)
LYMPHOCYTES # BLD AUTO: 0.7 10E9/L (ref 0.8–5.3)
LYMPHOCYTES NFR BLD AUTO: 6.4 %
MCH RBC QN AUTO: 26.1 PG (ref 26.5–33)
MCHC RBC AUTO-ENTMCNC: 30.2 G/DL (ref 31.5–36.5)
MCV RBC AUTO: 87 FL (ref 78–100)
MONOCYTES # BLD AUTO: 0.7 10E9/L (ref 0–1.3)
MONOCYTES NFR BLD AUTO: 6 %
NEUTROPHILS # BLD AUTO: 9.4 10E9/L (ref 1.6–8.3)
NEUTROPHILS NFR BLD AUTO: 85.9 %
NRBC # BLD AUTO: 0 10*3/UL
NRBC BLD AUTO-RTO: 0 /100
O2/TOTAL GAS SETTING VFR VENT: ABNORMAL %
PCO2 BLDV: 56 MM HG (ref 40–50)
PH BLDV: 7.36 PH (ref 7.32–7.43)
PLATELET # BLD AUTO: 284 10E9/L (ref 150–450)
PO2 BLDV: 18 MM HG (ref 25–47)
POTASSIUM SERPL-SCNC: 4.1 MMOL/L (ref 3.4–5.3)
PROT SERPL-MCNC: 8.5 G/DL (ref 6.8–8.8)
RBC # BLD AUTO: 5.48 10E12/L (ref 3.8–5.2)
RSV RNA SPEC QL NAA+PROBE: NORMAL
SARS-COV-2 RNA RESP QL NAA+PROBE: NEGATIVE
SODIUM SERPL-SCNC: 138 MMOL/L (ref 133–144)
SPECIMEN SOURCE: NORMAL
WBC # BLD AUTO: 10.9 10E9/L (ref 4–11)

## 2021-03-04 PROCEDURE — 87181 SC STD AGAR DILUTION PER AGT: CPT | Performed by: EMERGENCY MEDICINE

## 2021-03-04 PROCEDURE — 80053 COMPREHEN METABOLIC PANEL: CPT | Performed by: EMERGENCY MEDICINE

## 2021-03-04 PROCEDURE — 96375 TX/PRO/DX INJ NEW DRUG ADDON: CPT | Performed by: EMERGENCY MEDICINE

## 2021-03-04 PROCEDURE — 87040 BLOOD CULTURE FOR BACTERIA: CPT | Performed by: EMERGENCY MEDICINE

## 2021-03-04 PROCEDURE — 87186 SC STD MICRODIL/AGAR DIL: CPT | Performed by: EMERGENCY MEDICINE

## 2021-03-04 PROCEDURE — 85025 COMPLETE CBC W/AUTO DIFF WBC: CPT | Performed by: EMERGENCY MEDICINE

## 2021-03-04 PROCEDURE — 120N000001 HC R&B MED SURG/OB

## 2021-03-04 PROCEDURE — 99285 EMERGENCY DEPT VISIT HI MDM: CPT | Mod: 25 | Performed by: EMERGENCY MEDICINE

## 2021-03-04 PROCEDURE — 87185 SC STD ENZYME DETCJ PER NZM: CPT | Performed by: EMERGENCY MEDICINE

## 2021-03-04 PROCEDURE — 87077 CULTURE AEROBIC IDENTIFY: CPT | Performed by: EMERGENCY MEDICINE

## 2021-03-04 PROCEDURE — C9803 HOPD COVID-19 SPEC COLLECT: HCPCS | Performed by: EMERGENCY MEDICINE

## 2021-03-04 PROCEDURE — 85379 FIBRIN DEGRADATION QUANT: CPT | Performed by: EMERGENCY MEDICINE

## 2021-03-04 PROCEDURE — 71045 X-RAY EXAM CHEST 1 VIEW: CPT

## 2021-03-04 PROCEDURE — 81001 URINALYSIS AUTO W/SCOPE: CPT | Performed by: EMERGENCY MEDICINE

## 2021-03-04 PROCEDURE — 250N000011 HC RX IP 250 OP 636: Performed by: EMERGENCY MEDICINE

## 2021-03-04 PROCEDURE — 87086 URINE CULTURE/COLONY COUNT: CPT | Performed by: EMERGENCY MEDICINE

## 2021-03-04 PROCEDURE — 93005 ELECTROCARDIOGRAM TRACING: CPT | Performed by: EMERGENCY MEDICINE

## 2021-03-04 PROCEDURE — 258N000003 HC RX IP 258 OP 636: Performed by: EMERGENCY MEDICINE

## 2021-03-04 PROCEDURE — 87636 SARSCOV2 & INF A&B AMP PRB: CPT | Performed by: EMERGENCY MEDICINE

## 2021-03-04 PROCEDURE — 87088 URINE BACTERIA CULTURE: CPT | Performed by: EMERGENCY MEDICINE

## 2021-03-04 PROCEDURE — 87076 CULTURE ANAEROBE IDENT EACH: CPT | Performed by: EMERGENCY MEDICINE

## 2021-03-04 PROCEDURE — 96366 THER/PROPH/DIAG IV INF ADDON: CPT | Performed by: EMERGENCY MEDICINE

## 2021-03-04 PROCEDURE — 96365 THER/PROPH/DIAG IV INF INIT: CPT | Performed by: EMERGENCY MEDICINE

## 2021-03-04 PROCEDURE — 96367 TX/PROPH/DG ADDL SEQ IV INF: CPT | Performed by: EMERGENCY MEDICINE

## 2021-03-04 PROCEDURE — 82803 BLOOD GASES ANY COMBINATION: CPT | Performed by: EMERGENCY MEDICINE

## 2021-03-04 PROCEDURE — 87800 DETECT AGNT MULT DNA DIREC: CPT | Performed by: EMERGENCY MEDICINE

## 2021-03-04 PROCEDURE — 83605 ASSAY OF LACTIC ACID: CPT | Performed by: EMERGENCY MEDICINE

## 2021-03-04 PROCEDURE — 93010 ELECTROCARDIOGRAM REPORT: CPT | Performed by: EMERGENCY MEDICINE

## 2021-03-04 PROCEDURE — 250N000013 HC RX MED GY IP 250 OP 250 PS 637: Performed by: EMERGENCY MEDICINE

## 2021-03-04 PROCEDURE — 96361 HYDRATE IV INFUSION ADD-ON: CPT | Mod: 59 | Performed by: EMERGENCY MEDICINE

## 2021-03-04 PROCEDURE — 84484 ASSAY OF TROPONIN QUANT: CPT | Performed by: PHYSICIAN ASSISTANT

## 2021-03-04 RX ORDER — HYDROMORPHONE HYDROCHLORIDE 1 MG/ML
0.5 INJECTION, SOLUTION INTRAMUSCULAR; INTRAVENOUS; SUBCUTANEOUS
Status: DISCONTINUED | OUTPATIENT
Start: 2021-03-04 | End: 2021-03-05

## 2021-03-04 RX ORDER — IBUPROFEN 400 MG/1
800 TABLET, FILM COATED ORAL ONCE
Status: COMPLETED | OUTPATIENT
Start: 2021-03-04 | End: 2021-03-04

## 2021-03-04 RX ORDER — ACETAMINOPHEN 500 MG
1000 TABLET ORAL ONCE
Status: DISCONTINUED | OUTPATIENT
Start: 2021-03-04 | End: 2021-03-04

## 2021-03-04 RX ADMIN — VANCOMYCIN HYDROCHLORIDE 2500 MG: 10 INJECTION, POWDER, LYOPHILIZED, FOR SOLUTION INTRAVENOUS at 23:40

## 2021-03-04 RX ADMIN — SODIUM CHLORIDE, POTASSIUM CHLORIDE, SODIUM LACTATE AND CALCIUM CHLORIDE 1572 ML: 600; 310; 30; 20 INJECTION, SOLUTION INTRAVENOUS at 22:09

## 2021-03-04 RX ADMIN — HYDROMORPHONE HYDROCHLORIDE 0.5 MG: 1 INJECTION, SOLUTION INTRAMUSCULAR; INTRAVENOUS; SUBCUTANEOUS at 22:15

## 2021-03-04 RX ADMIN — TAZOBACTAM SODIUM AND PIPERACILLIN SODIUM 4.5 G: 500; 4 INJECTION, SOLUTION INTRAVENOUS at 23:06

## 2021-03-04 RX ADMIN — IBUPROFEN 800 MG: 400 TABLET ORAL at 22:20

## 2021-03-04 ASSESSMENT — ENCOUNTER SYMPTOMS
HEADACHES: 0
DIARRHEA: 0
WEAKNESS: 0
NECK STIFFNESS: 0
FATIGUE: 1
TROUBLE SWALLOWING: 0
NAUSEA: 0
FEVER: 1
SHORTNESS OF BREATH: 1
BACK PAIN: 0
DIAPHORESIS: 0
COUGH: 1
LIGHT-HEADEDNESS: 0
APPETITE CHANGE: 0
VOMITING: 0
ACTIVITY CHANGE: 1
ABDOMINAL PAIN: 0
CHILLS: 0
NUMBNESS: 0
FREQUENCY: 1
DYSURIA: 0
SORE THROAT: 0
CHEST TIGHTNESS: 0
ARTHRALGIAS: 1
DYSPHORIC MOOD: 1

## 2021-03-04 ASSESSMENT — MIFFLIN-ST. JEOR: SCORE: 2423.52

## 2021-03-05 ENCOUNTER — APPOINTMENT (OUTPATIENT)
Dept: CT IMAGING | Facility: CLINIC | Age: 46
DRG: 872 | End: 2021-03-05
Attending: EMERGENCY MEDICINE
Payer: COMMERCIAL

## 2021-03-05 ENCOUNTER — APPOINTMENT (OUTPATIENT)
Dept: GENERAL RADIOLOGY | Facility: CLINIC | Age: 46
DRG: 872 | End: 2021-03-05
Attending: PHYSICIAN ASSISTANT
Payer: COMMERCIAL

## 2021-03-05 ENCOUNTER — APPOINTMENT (OUTPATIENT)
Dept: OCCUPATIONAL THERAPY | Facility: CLINIC | Age: 46
DRG: 872 | End: 2021-03-05
Payer: COMMERCIAL

## 2021-03-05 PROBLEM — L03.90 CELLULITIS: Status: RESOLVED | Noted: 2020-02-24 | Resolved: 2021-03-05

## 2021-03-05 PROBLEM — G47.33 OSA (OBSTRUCTIVE SLEEP APNEA): Status: ACTIVE | Noted: 2017-08-22

## 2021-03-05 PROBLEM — F33.1 MODERATE EPISODE OF RECURRENT MAJOR DEPRESSIVE DISORDER (H): Status: ACTIVE | Noted: 2018-03-23

## 2021-03-05 LAB
ALBUMIN UR-MCNC: 100 MG/DL
APPEARANCE UR: CLEAR
BACTERIA #/AREA URNS HPF: ABNORMAL /HPF
BILIRUB UR QL STRIP: NEGATIVE
COLOR UR AUTO: YELLOW
CRP SERPL-MCNC: 43.3 MG/L (ref 0–8)
D DIMER PPP FEU-MCNC: 0.8 UG/ML FEU (ref 0–0.5)
GLUCOSE UR STRIP-MCNC: NEGATIVE MG/DL
HGB UR QL STRIP: ABNORMAL
KETONES UR STRIP-MCNC: 5 MG/DL
LEUKOCYTE ESTERASE UR QL STRIP: ABNORMAL
MUCOUS THREADS #/AREA URNS LPF: PRESENT /LPF
NITRATE UR QL: NEGATIVE
PH UR STRIP: 5 PH (ref 5–7)
RBC #/AREA URNS AUTO: 22 /HPF (ref 0–2)
SOURCE: ABNORMAL
SP GR UR STRIP: 1.02 (ref 1–1.03)
TROPONIN I SERPL-MCNC: <0.015 UG/L (ref 0–0.04)
UROBILINOGEN UR STRIP-MCNC: 4 MG/DL (ref 0–2)
WBC #/AREA URNS AUTO: 28 /HPF (ref 0–5)

## 2021-03-05 PROCEDURE — 250N000009 HC RX 250: Performed by: FAMILY MEDICINE

## 2021-03-05 PROCEDURE — 71275 CT ANGIOGRAPHY CHEST: CPT

## 2021-03-05 PROCEDURE — 94640 AIRWAY INHALATION TREATMENT: CPT

## 2021-03-05 PROCEDURE — 250N000013 HC RX MED GY IP 250 OP 250 PS 637: Performed by: EMERGENCY MEDICINE

## 2021-03-05 PROCEDURE — 95801 SLP STDY UNATND W/ANAL: CPT

## 2021-03-05 PROCEDURE — 250N000011 HC RX IP 250 OP 636: Performed by: EMERGENCY MEDICINE

## 2021-03-05 PROCEDURE — 120N000001 HC R&B MED SURG/OB

## 2021-03-05 PROCEDURE — 97535 SELF CARE MNGMENT TRAINING: CPT | Mod: GO

## 2021-03-05 PROCEDURE — 94640 AIRWAY INHALATION TREATMENT: CPT | Mod: 76

## 2021-03-05 PROCEDURE — 250N000009 HC RX 250: Performed by: EMERGENCY MEDICINE

## 2021-03-05 PROCEDURE — 250N000011 HC RX IP 250 OP 636: Performed by: PHYSICIAN ASSISTANT

## 2021-03-05 PROCEDURE — 999N000157 HC STATISTIC RCP TIME EA 10 MIN

## 2021-03-05 PROCEDURE — 86140 C-REACTIVE PROTEIN: CPT | Performed by: PHYSICIAN ASSISTANT

## 2021-03-05 PROCEDURE — 99223 1ST HOSP IP/OBS HIGH 75: CPT | Mod: AI | Performed by: INTERNAL MEDICINE

## 2021-03-05 PROCEDURE — 250N000011 HC RX IP 250 OP 636: Performed by: INTERNAL MEDICINE

## 2021-03-05 PROCEDURE — 73502 X-RAY EXAM HIP UNI 2-3 VIEWS: CPT

## 2021-03-05 PROCEDURE — 999N000123 HC STATISTIC OXYGEN O2DAILY TECH TIME

## 2021-03-05 PROCEDURE — 250N000013 HC RX MED GY IP 250 OP 250 PS 637: Performed by: PHYSICIAN ASSISTANT

## 2021-03-05 PROCEDURE — 999N000105 HC STATISTIC NO DOCUMENTATION TO SUPPORT CHARGE

## 2021-03-05 PROCEDURE — 94762 N-INVAS EAR/PLS OXIMTRY CONT: CPT

## 2021-03-05 PROCEDURE — 999N000097 HC STATISTIC MECHANICAL IN-EXSUFFLATION TREATMENT

## 2021-03-05 PROCEDURE — 250N000013 HC RX MED GY IP 250 OP 250 PS 637: Performed by: INTERNAL MEDICINE

## 2021-03-05 PROCEDURE — 258N000003 HC RX IP 258 OP 636: Performed by: PHYSICIAN ASSISTANT

## 2021-03-05 RX ORDER — NALOXONE HYDROCHLORIDE 0.4 MG/ML
0.2 INJECTION, SOLUTION INTRAMUSCULAR; INTRAVENOUS; SUBCUTANEOUS
Status: DISCONTINUED | OUTPATIENT
Start: 2021-03-05 | End: 2021-03-08 | Stop reason: HOSPADM

## 2021-03-05 RX ORDER — SODIUM CHLORIDE, SODIUM LACTATE, POTASSIUM CHLORIDE, CALCIUM CHLORIDE 600; 310; 30; 20 MG/100ML; MG/100ML; MG/100ML; MG/100ML
INJECTION, SOLUTION INTRAVENOUS CONTINUOUS
Status: DISCONTINUED | OUTPATIENT
Start: 2021-03-05 | End: 2021-03-05

## 2021-03-05 RX ORDER — OXYCODONE HYDROCHLORIDE 5 MG/1
5-10 TABLET ORAL EVERY 4 HOURS PRN
Status: DISCONTINUED | OUTPATIENT
Start: 2021-03-05 | End: 2021-03-05

## 2021-03-05 RX ORDER — IPRATROPIUM BROMIDE AND ALBUTEROL SULFATE 2.5; .5 MG/3ML; MG/3ML
3 SOLUTION RESPIRATORY (INHALATION) EVERY 4 HOURS PRN
Status: DISCONTINUED | OUTPATIENT
Start: 2021-03-05 | End: 2021-03-05

## 2021-03-05 RX ORDER — METOPROLOL SUCCINATE 25 MG/1
25 TABLET, EXTENDED RELEASE ORAL DAILY
Status: DISCONTINUED | OUTPATIENT
Start: 2021-03-05 | End: 2021-03-08 | Stop reason: HOSPADM

## 2021-03-05 RX ORDER — NALOXONE HYDROCHLORIDE 0.4 MG/ML
0.4 INJECTION, SOLUTION INTRAMUSCULAR; INTRAVENOUS; SUBCUTANEOUS
Status: DISCONTINUED | OUTPATIENT
Start: 2021-03-05 | End: 2021-03-08 | Stop reason: HOSPADM

## 2021-03-05 RX ORDER — ACETAMINOPHEN 325 MG/1
650 TABLET ORAL EVERY 4 HOURS PRN
Status: DISCONTINUED | OUTPATIENT
Start: 2021-03-05 | End: 2021-03-07

## 2021-03-05 RX ORDER — CEFAZOLIN SODIUM 2 G/100ML
2 INJECTION, SOLUTION INTRAVENOUS EVERY 8 HOURS
Status: DISCONTINUED | OUTPATIENT
Start: 2021-03-05 | End: 2021-03-05

## 2021-03-05 RX ORDER — HYDROMORPHONE HYDROCHLORIDE 1 MG/ML
.3-.5 INJECTION, SOLUTION INTRAMUSCULAR; INTRAVENOUS; SUBCUTANEOUS
Status: DISCONTINUED | OUTPATIENT
Start: 2021-03-05 | End: 2021-03-05

## 2021-03-05 RX ORDER — CEFTRIAXONE SODIUM 2 G/50ML
2 INJECTION, SOLUTION INTRAVENOUS EVERY 24 HOURS
Status: DISCONTINUED | OUTPATIENT
Start: 2021-03-05 | End: 2021-03-08 | Stop reason: HOSPADM

## 2021-03-05 RX ORDER — IOPAMIDOL 755 MG/ML
96 INJECTION, SOLUTION INTRAVASCULAR ONCE
Status: COMPLETED | OUTPATIENT
Start: 2021-03-05 | End: 2021-03-05

## 2021-03-05 RX ORDER — IBUPROFEN 400 MG/1
400 TABLET, FILM COATED ORAL EVERY 6 HOURS PRN
Status: DISCONTINUED | OUTPATIENT
Start: 2021-03-05 | End: 2021-03-08 | Stop reason: HOSPADM

## 2021-03-05 RX ORDER — IPRATROPIUM BROMIDE AND ALBUTEROL SULFATE 2.5; .5 MG/3ML; MG/3ML
3 SOLUTION RESPIRATORY (INHALATION)
Status: DISCONTINUED | OUTPATIENT
Start: 2021-03-05 | End: 2021-03-08 | Stop reason: HOSPADM

## 2021-03-05 RX ORDER — SERTRALINE HYDROCHLORIDE 100 MG/1
100 TABLET, FILM COATED ORAL DAILY
Status: DISCONTINUED | OUTPATIENT
Start: 2021-03-05 | End: 2021-03-08 | Stop reason: HOSPADM

## 2021-03-05 RX ORDER — LISINOPRIL 20 MG/1
20 TABLET ORAL DAILY
Status: DISCONTINUED | OUTPATIENT
Start: 2021-03-05 | End: 2021-03-08 | Stop reason: HOSPADM

## 2021-03-05 RX ADMIN — ENOXAPARIN SODIUM 40 MG: 100 INJECTION SUBCUTANEOUS at 05:41

## 2021-03-05 RX ADMIN — MICONAZOLE NITRATE: 20 POWDER TOPICAL at 13:44

## 2021-03-05 RX ADMIN — CEFTRIAXONE SODIUM 2 G: 2 INJECTION, SOLUTION INTRAVENOUS at 23:03

## 2021-03-05 RX ADMIN — IOPAMIDOL 96 ML: 755 INJECTION, SOLUTION INTRAVENOUS at 01:18

## 2021-03-05 RX ADMIN — ACETAMINOPHEN 650 MG: 325 TABLET, FILM COATED ORAL at 20:58

## 2021-03-05 RX ADMIN — SERTRALINE HYDROCHLORIDE 100 MG: 100 TABLET ORAL at 08:54

## 2021-03-05 RX ADMIN — IPRATROPIUM BROMIDE AND ALBUTEROL SULFATE 3 ML: .5; 3 SOLUTION RESPIRATORY (INHALATION) at 15:30

## 2021-03-05 RX ADMIN — TAZOBACTAM SODIUM AND PIPERACILLIN SODIUM 4.5 G: 500; 4 INJECTION, SOLUTION INTRAVENOUS at 09:01

## 2021-03-05 RX ADMIN — CEFAZOLIN SODIUM 2 G: 2 INJECTION, SOLUTION INTRAVENOUS at 15:48

## 2021-03-05 RX ADMIN — HYDROMORPHONE HYDROCHLORIDE 0.5 MG: 1 INJECTION, SOLUTION INTRAMUSCULAR; INTRAVENOUS; SUBCUTANEOUS at 04:47

## 2021-03-05 RX ADMIN — LISINOPRIL 20 MG: 20 TABLET ORAL at 08:54

## 2021-03-05 RX ADMIN — SODIUM CHLORIDE, POTASSIUM CHLORIDE, SODIUM LACTATE AND CALCIUM CHLORIDE: 600; 310; 30; 20 INJECTION, SOLUTION INTRAVENOUS at 15:47

## 2021-03-05 RX ADMIN — Medication 2.5 MG: at 13:44

## 2021-03-05 RX ADMIN — ACETAMINOPHEN 650 MG: 325 TABLET, FILM COATED ORAL at 04:47

## 2021-03-05 RX ADMIN — METOPROLOL SUCCINATE 25 MG: 25 TABLET, EXTENDED RELEASE ORAL at 08:54

## 2021-03-05 RX ADMIN — SODIUM CHLORIDE, POTASSIUM CHLORIDE, SODIUM LACTATE AND CALCIUM CHLORIDE: 600; 310; 30; 20 INJECTION, SOLUTION INTRAVENOUS at 08:53

## 2021-03-05 RX ADMIN — IPRATROPIUM BROMIDE AND ALBUTEROL SULFATE 3 ML: .5; 3 SOLUTION RESPIRATORY (INHALATION) at 20:34

## 2021-03-05 RX ADMIN — MICONAZOLE NITRATE 1 APPLICATOR: 20 POWDER TOPICAL at 20:53

## 2021-03-05 RX ADMIN — TAZOBACTAM SODIUM AND PIPERACILLIN SODIUM 4.5 G: 500; 4 INJECTION, SOLUTION INTRAVENOUS at 03:56

## 2021-03-05 RX ADMIN — SODIUM CHLORIDE 100 ML: 9 INJECTION, SOLUTION INTRAVENOUS at 01:18

## 2021-03-05 ASSESSMENT — ACTIVITIES OF DAILY LIVING (ADL)
DEPENDENT_IADLS:: CLEANING;SHOPPING;TRANSPORTATION
ADLS_ACUITY_SCORE: 19
ADLS_ACUITY_SCORE: 19
FALL_HISTORY_WITHIN_LAST_SIX_MONTHS: NO
DIFFICULTY_COMMUNICATING: NO
DRESSING/BATHING: DRESSING DIFFICULTY, ASSISTANCE 1 PERSON
ADLS_ACUITY_SCORE: 19
DIFFICULTY_EATING/SWALLOWING: NO
ADLS_ACUITY_SCORE: 19
DRESSING/BATHING_DIFFICULTY: YES
TOILETING_ASSISTANCE: TOILETING DIFFICULTY, REQUIRES EQUIPMENT
EQUIPMENT_CURRENTLY_USED_AT_HOME: WALKER, STANDARD;WHEELCHAIR, MANUAL
WHICH_OF_THE_ABOVE_FUNCTIONAL_RISKS_HAD_A_RECENT_ONSET_OR_CHANGE?: AMBULATION;TRANSFERRING;TOILETING;BATHING;DRESSING
DOING_ERRANDS_INDEPENDENTLY_DIFFICULTY: NO
CONCENTRATING,_REMEMBERING_OR_MAKING_DECISIONS_DIFFICULTY: NO
TOILETING_ISSUES: YES
WALKING_OR_CLIMBING_STAIRS: TRANSFERRING DIFFICULTY, DEPENDENT
ADLS_ACUITY_SCORE: 19
WALKING_OR_CLIMBING_STAIRS_DIFFICULTY: YES
WEAR_GLASSES_OR_BLIND: NO

## 2021-03-05 ASSESSMENT — MIFFLIN-ST. JEOR
SCORE: 2589.13
SCORE: 2572.13

## 2021-03-05 NOTE — CONSULTS
Addendum @ 1630:  Patient accepted at [facility] (Phone: [phone] Fax: [phone]), [facility], all facilities have beds available as soon as 3/7.  Discussed with patient at bedside and patient's sister, Anali (on facetime with patient).  Encouraged patient and Anali to explore medicare.gov for star ratings of facilities.      Patient chooses [facility] ([phone]).  Spoke with [place] with [facility] central admissions team ([phone]), with acceptance and plan for potential discharge on 3/7.  Care management team to connect with [facility] admission [facility] @ [phone] to coordinate timing of discharge over the weekend.       Discussed transportation with patient.  She requests CTS arrange transportation upon discharge.          Care Management Initial Consult    General Information  Assessment completed with: PatientBess  Type of CM/SW Visit: Initial Assessment    Primary Care Provider verified and updated as needed: Yes   Readmission within the last 30 days: no previous admission in last 30 days      Reason for Consult: discharge planning  Advance Care Planning: Advance Care Planning Reviewed: no concerns identified          Communication Assessment  Patient's communication style: spoken language (English or Bilingual)    Hearing Difficulty or Deaf: no   Wear Glasses or Blind: no    Cognitive  Cognitive/Neuro/Behavioral: WDL                      Living Environment:   People in home: alone     Current living Arrangements: other (see comments)([facility])      Able to return to prior arrangements: yes       Family/Social Support:  Care provided by: self  Provides care for: no one  Marital Status: Single  Sibling(s)          Description of Support System: Supportive, Involved    Support Assessment: Adequate family and caregiver support    Current Resources:   Patient receiving home care services: No     Community Resources: None  Equipment  currently used at home: walker, standard, wheelchair, power, shower chair  Supplies currently used at home: Other, Compression Stockings(lymphedema wraps)    Employment/Financial:  Employment Status: disabled        Financial Concerns: No concerns identified   Referral to Financial Counselor: No       Lifestyle & Psychosocial Needs:  Lifestyle     Physical activity     Days per week: 0 days     Minutes per session: 0 min     Stress: Only a little     Social Needs     Financial resource strain: Not hard at all     Food insecurity     Worry: Never true     Inability: Never true     Transportation needs     Medical: No     Non-medical: No     Socioeconomic History     Marital status: Single     Spouse name: Not on file     Number of children: Not on file     Years of education: Not on file     Highest education level: Not on file   Occupational History     Employer: [employer]     Social connections     Talks on phone: Once a week     Gets together: Never     Attends Anglican service: Never     Active member of club or organization: No     Attends meetings of clubs or organizations: Never     Relationship status: Never      Intimate partner violence     Fear of current or ex partner: No     Emotionally abused: No     Physically abused: No     Forced sexual activity: No     Tobacco Use     Smoking status: Never Smoker     Smokeless tobacco: Never Used   Substance and Sexual Activity     Alcohol use: Yes     Comment: social     Drug use: No     Sexual activity: Not Currently     Partners: Male     Birth control/protection: Pill       Functional Status:  Prior to admission patient needed assistance:   Dependent ADLs:: Ambulation-walker, Wheelchair-independent  Dependent IADLs:: Cleaning, Shopping, Transportation  Assesssment of Functional Status: Not at  functional baseline, Needs placement in a SNF/TCF for rehabilitation    Mental Health Status:  Mental Health Status: Current Concern  Mental  Health Management: Individual Therapy, Medication    Chemical Dependency Status:  Chemical Dependency Status: No Current Concerns             Values/Beliefs:  Spiritual, Cultural Beliefs, Congregation Practices, Values that affect care: no               Additional Information:  Met with patient at bedside, introduced self and role.  Patient currently lives alone in a town home in [place].  She is not current with any home care services, however she recently completed a course of RN, PT and OT for lymphedema therapy.  Over the last year, she has been had increasing difficulty with managing her care at home.  She has not left her home in a year.  She privately pays for a  twice monthly.  She worked as a  until a year ago, and is not transitioning off of long-term disability to social security/disabiltiy.  She and her sister are working with [facility] to manage transition.  She uses a walker or wheelchair for ambulation assistance.  She uses a bedside commode, and sleeps in her recliner.  In the last week, she has been unable to ambulate due to pain and weakness.  She does NOT currently drive due to medical issues.      She virtually visits with a therapist often regarding her depression and anxiety.      Her sister, Anali lives in [place] and is supportive.  Her friend, Yesy is also supportive.     Discussed discharge planning.  Patient feels unsafe with her current state at home.  Discussed TCU.  She is agreeable if recommended.  Due to patient's bariatric size, specialized TCU required.      TCU referrals as below:    [facility] (Phone: [phone] Fax: [phone]) --pending    [facility] (Phone: [phone] Admissions: [phone] Fax: [phone])--pending    [facility]-pending    [facility]-pending    [facility] (Phone: [phone] Fax: [phone])--pending    [facility] (Phone:  [phone] Fax: [phone]). --pending    [facility] (Phone: [phone] Fax: [phone])--declined-over weight limit    [facility] Phone (Main Phone:[phone] Admissions Phone:[phone] Fax: [phone])-declined-over weight limit    [facility] (Main: [phone] Admissions: [phone] Fax: [phone])-declined over weight limit    [facility] (Phone: [phone] Admissions: [phone] Fax: [phone])-declined over weight limit      PLAN:  TCU (ref pend)    Cyndi THOMASN RN  Inpatient Care Coordinator  [facility] [phone]  [facility] [phone]

## 2021-03-05 NOTE — PLAN OF CARE
Patient slid off the bed while working with OT and nurses aid patient assisted back to bed with the lift and multiple staff patient appears ok back in bed rounding PA aware will get portable Xray of her hip

## 2021-03-05 NOTE — PROGRESS NOTES
"[facility] ADMISSION NOTE    Patient admitted to room 2208 at approximately 0230 via bed from emergency room. Patient was accompanied by nurse.     Verbal SBAR report received from KINDRA Jimenez prior to patient arrival.     Patient trasferred to bed via lb. Patient alert and oriented X 2. Pain is not well controlled.  Medication(s) being used: acetaminophen.  . Admission vital signs: Blood pressure 112/50, pulse 121, temperature 100.4  F (38  C), temperature source Oral, resp. rate 24, height 1.6 m (5' 3\"), weight (!) 198 kg (436 lb 8.2 oz), SpO2 94 %, not currently breastfeeding. Patient was oriented to plan of care, call light, bed controls, tv, telephone, bathroom and visiting hours.     Risk Assessment    The following safety risks were identified during admission: fall. Yellow risk band applied: YES.     Skin Initial Assessment    This writer admitted this patient and completed a full skin assessment and Ben score in the Adult PCS flowsheet. Appropriate interventions initiated as needed.     Secondary skin check completed by Anay Hayward RN.    Ben Risk Assessment  Sensory Perception: 2-->very limited  Moisture: 3-->occasionally moist  Activity: 1-->bedfast  Mobility: 2-->very limited  Nutrition: 3-->adequate  Friction and Shear: 1-->problem  Ben Score: 12  Friction/Shear Interventions: HOB 30 degrees or less  Mattress: Standard Hospital Mattress (Foam)([facility] bed ordered)  Bed Frame: Standard width and length  Informed Refusal Interventions: No    Education    Patient has a [place] to Observation order: No  Observation education completed and documented: N/A      Erum Cabrera RN    "

## 2021-03-05 NOTE — H&P
[facility]    History and Physical - Hospitalist Service       Date of Admission:  3/4/2021    Assessment & Plan   Bess Enamorado is a 45 year old female admitted on 3/4/2021 Cellulitis of right leg and acute respiratory failure with hypoxemia.      Cellulitis of right LEs with severe lower extremity lymphedema.  Admit the patient to medical telemetry floor.  Continue IV Zosyn and Vancomycin.  Follow up blood cultures and de-escalate antibiotics accordingly.  Monitor for sepsis.  Elevate leg if possible.    Acute respiratory failure with hypoxemia.  CTA chest negative for PE and PNA.  ?NARCISA/Obesity hypoventilated syndrome + or - pain medications given by EMS and in ER.  Prn Inhalers and continue O2 for now.  Wean down O2 as able.  COVID/Influenza testing negative.  Note EKG shows no acute ST/T wave changes (though has non-specific T wave changes).  Will add on troponin.      NARCISA.  Not compliant with home CPAP.  Discussed importance of CPAP with the patient.  Continue CPAP.    HTN.  Resume home lisinopril and metoprolol.    Depression.  Resume home zoloft    Morbid obesity.    Multiple sclerosis on chronic treatment with Ocrelizumab    DVT PPx lovenox    Code status full code    Disposition home in 2 days     Elias Ellis MD 03/05/2021, 3:29 AM     The patient's care was discussed with the Bedside Nurse.    Elias Ellis MD  [facility]  Contact information available via [facility] Paging/Directory      ______________________________________________________________________    Chief Complaint   Fever with increasing redness in right leg and groin area.    History is obtained from the patient    History of Present Illness   Bess Enamorado is a 45 year old female with PMHx of multiple sclerosis on chronic treatment with Ocrelizumab, morbid obesity, severe lower extremity lymphedema and HTN presents with complaint of a fever and worsening right leg/groin redness.  Per the  patient's report, the patient started to experience subjective fever and chills last night.  The patient states that she develop a low grade fever of 100.1F at home.  The patient states that due to her morbidly obese body habitus, it is difficult for her to see her legs but thinks that she might have increase redness there.  The patient does have some mild increasing pain in her right leg.  The patient also has a mild dry cough but denies any nausea, vomiting, diarrhea, abdominal pain or dysuria.  The patient also denies any chest pain or shortness of breath.    In our ER, the patient has sign of cellulitis in her right leg/groin area per our ER physician.  The patient was hemodynamically stable and was afebrile.  The patient however did require 3-4L of O2.  CTA of the chest was negative for PNA/PE.  The patient COVID/Influenza testing were negative.  The patient was given Zosyn/Vancomycin for her cellulitis.      Review of Systems    The 10 point Review of Systems is negative other than noted in the HPI or here.     Past Medical History    I have reviewed this patient's medical history and updated it with pertinent information if needed.   Past Medical History:   Diagnosis Date     ASCUS favor benign 8/2015    Neg HPV     H/O colposcopy with cervical biopsy 9/14/15    Bx & ECC - negative     LSIL on Pap smear 7/2014    Neg high risk HPV     Multiple sclerosis (H) 8/29/2005 9/18/200pt dx with MS 2004--dx by neurolgist and is followed by Dr. Steven Ayala 10/2016     UNSPEC CONSTIPATION 9/18/2006 9/18/2006   Has tried otc suppository and otc lax.        Past Surgical History   I have reviewed this patient's surgical history and updated it with pertinent information if needed.  Past Surgical History:   Procedure Laterality Date     CHOLECYSTECTOMY, LAPOROSCOPIC      Cholecystectomy, Laparoscopic     OPEN REDUCTION INTERNAL FIXATION TOE(S)  4/13/2012    Procedure:OPEN REDUCTION INTERNAL FIXATION TOE(S);  Open reduction internal fixation right proximal fifth metatarsal fracture-   Anes-choice block; Surgeon:LEY, JEFFREY DUANE; Location:[facility] OR       Social History   I have reviewed this patient's social history and updated it with pertinent information if needed.  Social History     Tobacco Use     Smoking status: Never Smoker     Smokeless tobacco: Never Used   Substance Use Topics     Alcohol use: Yes     Comment: social     Drug use: No       Family History   I have reviewed this patient's family history and updated it with pertinent information if needed.  Family History   Problem Relation Age of Onset     Neurologic Disorder Father         MS     Heart Disease Father         irregular heart rate     Diabetes Father      Melanoma Father      Sleep Apnea Father      Cancer Maternal Grandfather         lung     Cancer Paternal Grandmother         stomach     Cancer Mother      Gynecology Maternal Aunt         PCOS       Prior to Admission Medications   Prior to Admission Medications   Prescriptions Last Dose Informant Patient Reported? Taking?   Cholecalciferol (VITAMIN D3 PO) 3/4/2021 at Unknown time Self Yes Yes   Sig: Take 10,000 Units by mouth daily    acetaminophen (TYLENOL) 500 MG tablet 3/4/2021 at 1800 Self Yes Yes   Sig: Take 500-1,000 mg by mouth once as needed for mild pain   desogestrel-ethinyl estradiol (APRI) 0.15-30 MG-MCG tablet Past Week at Unknown time Self No Yes   Sig: TAKE 1 TABLET DAILY CONTINUOUSLY-OMIT PLACEBPO TABS UNTIL AFTER 3 MONTHS OF HORMONE TABS   lisinopril (PRINIVIL/ZESTRIL) 20 MG tablet 3/4/2021 at 1500 Self No Yes   Sig: Take 1 tablet (20 mg) by mouth daily   metoprolol succinate ER (TOPROL-XL) 25 MG 24 hr tablet 3/4/2021 at 1500 Self No Yes   Sig: Take 1 tablet (25 mg) by mouth daily   order for DME  Self Yes No   Sig: Equipment ordered: RESMED CPAP Mask type: Nasal Settings: 10 CM H2O   sertraline (ZOLOFT) 100 MG tablet 3/4/2021 at Unknown time  No Yes   Sig: Take 1 tablet (100  mg) by mouth daily      Facility-Administered Medications: None     Allergies   Allergies   Allergen Reactions     Nkda [No Known Drug Allergies]        Physical Exam   Vital Signs: Temp: 100.4  F (38  C) Temp src: Oral BP: 112/50 Pulse: 121   Resp: 24 SpO2: 94 % O2 Device: Nasal cannula Oxygen Delivery: 4 LPM  Weight: 436 lbs 8.17 oz    GENERAL: The patient is not in any acute distressed. Awake and alert.  Morbidly obese female  HEENT: Nonicteric sclerae, PERRLA, EOMI. Oropharynx clear. Moist mucous membranes. Conjunctivae appear well perfused.  HEART: Tachycardia but regular rhythm without murmurs.   LUNGS: Clear to auscultation bilaterally. No wheezing, crackles or rhonchi  ABDOMEN:Obese Soft, positive bowel sounds, nontender.  SKIN: Significant LEs lymphedema with significant erythema R>L upper thigh and groin area.  No open lesion noted.  Please refer to image in Epic.  NEUROLOGIC: AxO x 3.  Cranial nerves II-XII intact without motor/sensory deficit.        Data   Data reviewed today: I reviewed all medications, new labs and imaging results over the last 24 hours.    Recent Labs   Lab 03/04/21  2127   WBC 10.9   HGB 14.3   MCV 87         POTASSIUM 4.1   CHLORIDE 104   CO2 28   BUN 15   CR 0.69   ANIONGAP 6   EVITA 9.5   *   ALBUMIN 3.6   PROTTOTAL 8.5   BILITOTAL 1.7*   ALKPHOS 67   ALT 20   AST 17     Recent Results (from the past 24 hour(s))   XR Chest Port 1 View    Narrative    EXAM: XR CHEST PORT 1 VW  LOCATION: [facility]  DATE/TIME: 3/4/2021 9:59 PM    INDICATION: Fever.  COMPARISON: 06/14/2013. The heart size is normal. There is a mild right basilar infiltrate and a infiltrate in the left midlung. Right hemidiaphragm is elevated. No pneumothorax.      Impression    IMPRESSION: Bilateral pulmonary infiltrates may be pneumonia.   CT Chest Pulmonary Embolism w Contrast    Narrative    EXAM: CT CHEST PULMONARY EMBOLISM W CONTRAST  LOCATION: [facility]  "Services  DATE/TIME: 3/5/2021 1:16 AM    INDICATION: PE suspected, high prob  COMPARISON: None.  TECHNIQUE: CT chest pulmonary angiogram during arterial phase injection of IV contrast. Multiplanar reformats and MIP reconstructions were performed. Dose reduction techniques were used.   CONTRAST: 96 mL Isovue-370    FINDINGS:  ANGIOGRAM CHEST: No evidence for pulmonary embolism. Pulmonary arteries markedly dilated. Thoracic aorta normal in caliber. No aortic dissection or other acute abnormality.    HEART: Mild right atrial and ventricular enlargement. Mild straightening of the interventricular septum. Moderate mitral annular calcification. No coronary artery calcification.    LUNGS AND PLEURA: Mosaic attenuation throughout the lungs. No pulmonary mass, consolidation, or suspicious pulmonary nodule. No pleural effusion or pneumothorax.    MEDIASTINUM: No adenopathy or mass.    LIMITED UPPER ABDOMEN: Hepatomegaly. Prior cholecystectomy.    MUSCULOSKELETAL: Negative.      Impression    IMPRESSION:  1.  No evidence for pulmonary embolism.   2.  Pulmonary arterial hypertension.   Telemed statement : The patient was evaluated via telemedicine and was in agreement with the plan.     The nurse was at the bedside during the entire encounter which took place virtually from  [place] .     The patient was evaluated at  [facility]\"  "

## 2021-03-05 NOTE — PLAN OF CARE
Problem: Adult Inpatient Plan of Care  Goal: Plan of Care Review  Outcome: No Change  Flowsheets (Taken 3/5/2021 0413)  Plan of Care Reviewed With: patient  Outcome Summary: Currently awaiting admission orders from Telemed MD.     Problem: Adult Inpatient Plan of Care  Goal: Absence of Hospital-Acquired Illness or Injury  Outcome: No Change  Intervention: Identify and Manage Fall Risk  Flowsheets (Taken 3/5/2021 0227)  Safety Promotion/Fall Prevention:   activity supervised   fall prevention program maintained   treat reversible contributory factors  Intervention: Prevent Skin Injury  Recent Flowsheet Documentation  Taken 3/5/2021 0217 by Erum Cabrera RN  Body Position: supine     Problem: Adult Inpatient Plan of Care  Goal: Optimal Comfort and Wellbeing  Outcome: No Change     Patient and staff currently awaiting full admission orders. She continues on IV atbs (broad spectrum) until BC result. Able to rest in room intermittently. Scattered blister-like areas to inner thighs and alexander area; scattered reddened area to BLEs.   She reports not leaving house for at least 1 year. Usually uses WC and walker, with commode at home, but is currently too weak to care for self.   She has chronic hip pain, left worse than right. Takes Tylenol arthritis to treat.   Current menstruation.   Will follow admission orders after MD sees patient.

## 2021-03-05 NOTE — PROGRESS NOTES
[facility]    Medicine Progress Note - Hospitalist Service       Date of Admission:  3/4/2021  Assessment & Plan       Bess Enamorado is a 45 year old female admitted on 3/4/2021. She has history of MS, hypertension, obesity and lymphedema. She presents with cellulitis of right leg and acute respiratory failure with hypoxemia.    Cellulitis of the right lower extremity  Fevers and chills prior to arrival, increased erythema of left lower extremity noted on exam. No prior outpatient treatment. Vitals show fever to 102 and tachycardia. Labs show normal WBC and lactic, CRP 43.3 following abx.Started on IV vancomycin and IV Zosyn on presentation. Photographed on presentation to the ED, erythema appears slightly improved on 3/5/21. Plan to de-escalate IV antibiotics to cefazolin.  - antibiotics: IV cefazolin (started 3/5/21)  - follow CBC, CRP  - follow pending blood cultures  - pain: acetaminophen prn, ibuprofen prn, oxycodone prn only for breakthrough  - elevate legs, will trial compression for chronic edema if tolerated  - See photograph taken on presentation in ED providers note   - jiang in place to promote skin healing, noted blistering/nodules of inner thighs    Sepsis due to cellulitis  Patient meets SIRs criteria with Temp 102.3, HR 120s. WBC normal, blood pressure normal and lactic normal. Appears to have cellulitis as above. UA also abnormal with 28 WBC, bacteria, leukocyte esterase, and 22 RBC, though no clear symptoms. CT chest negative for infectious process. IVF resuscitation 1.5 L in ED. Early antibiotic with IV vancomycin and IV Zoysn.   - IVF: LR at 125 ml/hr  - antibiotic: IV Zosyn (started 3/4/21), stop vanco  - blood cultures pending  - urine cultures pending  - add on CRP  - follow CBC, BMP  - further infectious management as above    Acute Respiratory Failure with Hypoxemia, on chronic  Oxygen saturation 76%, started on 4 L of oxygen with no home oxygen requirement.   Increased work of breathing on exam though patient states she is at her baseline.  Lungs sound distant though clear.  VBG show mild chronic respiratory failure with compensated hypercapnia (pH 7.36, CO2 56). VS show tachycardia otherwise stable. Troponin negative. ECG shows sinus tachcycardia with diffuse nonspecific T wave changes, no acute ischemic ST changes. CT PE study shows no signs of acute process including pneumonia or PE. Suspect multifactorial related to untreated sleep apnea, obesity hypoventilation syndrome, as well as narcotics.   Able to titrate oxygen down to 1.5 L this morning with oxygen level of 90-93%.  - continue O2 therapy, titrate to maintain sats > 91%  - encouraged home CPAP use  - reduce narcotics as able  - incentive spirometry ordered    Generalized Weakness  Increased weakness in the setting of illness. At baseline using wheel chair and walker to get around due to pain and reduced mobility. Patient has been homebound for the past year due to mobility issues, pain and weight gain.  - PT/OT/care transitions consulted     NARCISA  Suspected obesity hypoventilation syndrome  Not compliant with home CPAP. Discussed importance of CPAP with the patient.    - Continue home CPAP if available    Fall  Patient had a fall on 3/5/21 in the hospital during OT evaluation. Slid down to the ground, landing on her bottom. No significant impact though left leg was somewhat awkwardly positioned. Currently at rest she does not have significant pain. She does have chronic left hip pain with movement, she currently continues to have left hip pain with movement that is unchanged from her baseline though significant. No significant pain in ankle or knee with attempted ROM though exam difficult due to body habitus. As below she has known osteoarthritis and multiple hip fractures per MR in 2019.   - portable XRAY pelvis/left hip     Lymphedema  Chronic. Previously has followed with outpatient lymphedema therapy.  -  continue compression therapy as tolerated, lymphedema consutled    Hypertension  Chronic. Blood pressures reviewed, stable.   - continue home lisinopril and metoprolol with parameters    Multiple sclerosis   On chronic treatment with Ocrelizumab. Has been using wheel chair and walker to get around due to pain and reduced mobility due to MS and OA as below.    Osteoarthritis of left hip  Issues with left hip pain increasing over the past year, greatly affecting mobility. MR has previously shown labrum dengeneration, osteoarthritic changes, and multiple pelvic fractures.     Depression  - continue home zoloft    Obesity  Body mass index is 77.32 kg/m . Contributes to above.    COVID-19 Ruled Out, negative PCR on presentation on 3/4/21. CT without signs of infection. As above is hypoxemic and has had increase in chronic dry cough though low suspicion for infection in patient who is home bound with minimal exposures with no radiographic signs of COVID on CT.  - no precautions     Diet: Low Saturated Fat Na <2400 mg    DVT Prophylaxis: Enoxaparin (Lovenox) SQ  Brar Catheter: in place, indication: Other (Comment)  Code Status: Full Code         Disposition Plan   Expected discharge: 2 - 3 days, recommended to likely TCU once infection improving and plan for outpatient antibiotics established and appropriate disposition identified.  Entered: Alanis Taylor PA-C 03/05/2021, 10:49 AM     The patient's care was discussed with the Attending Physician, Dr. Frank Wallace and Patient.    Alains Taylor PA-C  Hospitalist Service  [facility]  Contact information available via [facility] Paging/Directory  ______________________________________________________________________    Interval History   Feeling better today, still feels feverish though temperature normal this morning. No further chills. She feels weaker than usual with this, states illness always makes her MS worse. Appetite decreased.  Chronic leg pain, described as spasms when anything touches her legs, no clear change. Unable to really evaluate her legs. Typically uses compression wraps.    Feels her breathing is at baseline, no shortness of breath. Chronic cough, perhaps a little more prominent in the past few weeks. No chest pain or palpitations.     Denies chest congestion, nausea, emesis, abdominal pain, diarrhea or urinary changes. Chronic urinary frequency, no dysuria.    Data reviewed today: I reviewed all medications, new labs and imaging results over the last 24 hours. I personally reviewed no images or EKG's today.    Physical Exam   Vital Signs: Temp: 98  F (36.7  C) Temp src: Oral BP: 123/70 Pulse: 110   Resp: 18 SpO2: 91 % O2 Device: Nasal cannula Oxygen Delivery: 4 LPM  Weight: 436 lbs 8.17 oz  Constitutional: sitting up comfortably in bed, mildly ill-appearing though non-toxic. awake, alert, cooperative, and appears stated age  ENT: oropharynx clear and moist.  Respiratory: Mild increased work of breathing, lungs sounds distant difficult to assess, grossly clear to auscultation bilaterally, no crackles, wheezing or rhonchi appreciated.  Cardiovascular: fast rate and regular rhythm, and no murmur noted. Edema difficult to assess due to obesity.  GI: morbidly obese, normal bowel sounds, soft, non-distended, non-tender  Genitounirinary: jiang catheter in place draining zarina colored urine.  Skin: bilateral lower extremities with chronic appearing skin changes with nodular appearing areas (inner thighs and pannus) as well as bullae (bilateral shins). Right lower extremity with increased circumferential erythema from ankle up to thigh  Musculoskeletal: normal tone. Moves all 4 extremities.  Neurologic: Awake, alert, oriented to name, place and time.    Neuropsychiatric: calm, pleasant, cooperative. Appropriate thought process/content. Short term memory intact.    Data   Recent Labs   Lab 03/04/21 2127   WBC 10.9   HGB 14.3   MCV 87          POTASSIUM 4.1   CHLORIDE 104   CO2 28   BUN 15   CR 0.69   ANIONGAP 6   EVITA 9.5   *   ALBUMIN 3.6   PROTTOTAL 8.5   BILITOTAL 1.7*   ALKPHOS 67   ALT 20   AST 17   TROPI <0.015     Recent Results (from the past 24 hour(s))   XR Chest Port 1 View    Narrative    EXAM: XR CHEST PORT 1 VW  LOCATION: [facility]  DATE/TIME: 3/4/2021 9:59 PM    INDICATION: Fever.  COMPARISON: 06/14/2013. The heart size is normal. There is a mild right basilar infiltrate and a infiltrate in the left midlung. Right hemidiaphragm is elevated. No pneumothorax.      Impression    IMPRESSION: Bilateral pulmonary infiltrates may be pneumonia.   CT Chest Pulmonary Embolism w Contrast    Narrative    EXAM: CT CHEST PULMONARY EMBOLISM W CONTRAST  LOCATION: [facility]  DATE/TIME: 3/5/2021 1:16 AM    INDICATION: PE suspected, high prob  COMPARISON: None.  TECHNIQUE: CT chest pulmonary angiogram during arterial phase injection of IV contrast. Multiplanar reformats and MIP reconstructions were performed. Dose reduction techniques were used.   CONTRAST: 96 mL Isovue-370    FINDINGS:  ANGIOGRAM CHEST: No evidence for pulmonary embolism. Pulmonary arteries markedly dilated. Thoracic aorta normal in caliber. No aortic dissection or other acute abnormality.    HEART: Mild right atrial and ventricular enlargement. Mild straightening of the interventricular septum. Moderate mitral annular calcification. No coronary artery calcification.    LUNGS AND PLEURA: Mosaic attenuation throughout the lungs. No pulmonary mass, consolidation, or suspicious pulmonary nodule. No pleural effusion or pneumothorax.    MEDIASTINUM: No adenopathy or mass.    LIMITED UPPER ABDOMEN: Hepatomegaly. Prior cholecystectomy.    MUSCULOSKELETAL: Negative.      Impression    IMPRESSION:  1.  No evidence for pulmonary embolism.   2.  Pulmonary arterial hypertension.     Medications     lactated ringers 125 mL/hr at 03/05/21 0817        enoxaparin ANTICOAGULANT  40 mg Subcutaneous Q12H     lisinopril  20 mg Oral Daily     metoprolol succinate ER  25 mg Oral Daily     piperacillin-tazobactam  4.5 g Intravenous Q6H     sertraline  100 mg Oral Daily     sodium chloride (PF)  3 mL Intracatheter Q8H

## 2021-03-05 NOTE — PHARMACY-VANCOMYCIN DOSING SERVICE
Pharmacy Vancomycin Initial Note  Date of Service 2021  Patient's  1975  45 year old, female    Indication: Skin and Soft Tissue Infection , Sepsis?  Current estimated CrCl = Estimated Creatinine Clearance: 167.3 mL/min (based on SCr of 0.69 mg/dL).    Creatinine for last 3 days  3/4/2021:  9:27 PM Creatinine 0.69 mg/dL    Recent Vancomycin Level(s) for last 3 days  No results found for requested labs within last 72 hours.      Vancomycin IV Administrations (past 72 hours)      No vancomycin orders with administrations in past 72 hours.                Nephrotoxins and other renal medications (From now, onward)    Start     Dose/Rate Route Frequency Ordered Stop    21 220  vancomycin (VANCOCIN) 2,500 mg in sodium chloride 0.9 % 500 mL intermittent infusion      2,500 mg  over 2 Hours Intravenous ONCE 21  ibuprofen (ADVIL/MOTRIN) tablet 800 mg      800 mg Oral ONCE 21  piperacillin-tazobactam (ZOSYN) intermittent infusion 4.5 g      4.5 g  over 30 Minutes Intravenous EVERY 6 HOURS 21            Contrast Orders - past 72 hours (72h ago, onward)    None                Plan:  1.  Start vancomycin  2500 mg IV q12h.   2.  Goal Trough Level: 15-20 mg/l, would favor closer to 15 mg/l as WBC/ANC and lactic acid wnl.   3.  Pharmacy will check trough levels as appropriate in 1-3 Days.    4. Serum creatinine levels will be ordered daily for the first week of therapy and at least twice weekly for subsequent weeks.    5. [place] method utilized to dose vancomycin therapy: Method 2 using max dose of 2500 mg.    Corinna Stock, PharmD

## 2021-03-05 NOTE — PLAN OF CARE
PT: Hold PT evaluation today per interdisciplinary discussion (environmental and safety concerns, fall earlier today). Will re-attempt 3/6.

## 2021-03-05 NOTE — ED PROVIDER NOTES
History     Chief Complaint   Patient presents with     Fever     Pt reports feeling shaky, then developed a temp of 100.1. Pt has a hx of chronic pain      HPI  Bess Enamorado is a 45 year old female with a history of morbid obesity, severe lower extremity lymphedema, and multiple sclerosis on chronic treatment with ocrelizumab presenting for evaluation of fever and chills tonight.  Patient reports she has had a mild cough for a few weeks but this has not significantly changed.  Reports she has been dealing with her chronic hip pain which has been stable severe and debilitating.  Denies any significant trouble breathing.  Denies abdominal pain, nausea, vomiting, or change in bowel habits.  Denies dysuria, urgency, but does have chronic frequency urinating about every hour which is at baseline.  Does have chronic lymphedema but states she cannot see her legs due to her obesity so does not know if they look any different than baseline.  Patient does report chronic pain in her legs but does not know if this is different from baseline.  Patient reports she felt feverish this evening and had a temperature measured just over 100.  She did take a dose of Tylenol in the afternoon as well as in the evening for the fever, last dose just before coming into the hospital.  No known sick contacts.    Allergies:  Allergies   Allergen Reactions     Nkda [No Known Drug Allergies]        Problem List:    Patient Active Problem List    Diagnosis Date Noted     Cellulitis 03/05/2021     Priority: Medium     Tachycardia 03/04/2021     Priority: Medium     SIRS (systemic inflammatory response syndrome) (H) 03/04/2021     Priority: Medium     Cellulitis of right leg 03/04/2021     Priority: Medium     Lymphedema of both lower extremities 06/15/2020     Priority: Medium     Generalized anxiety disorder 03/23/2020     Priority: Medium     History of abnormal cervical Pap smear 08/26/2019     Priority: Medium     Cellulitis of abdominal  wall 10/22/2018     Priority: Medium     Moderate episode of recurrent major depressive disorder (H) 03/23/2018     Priority: Medium     NARCISA (obstructive sleep apnea) 08/22/2017     Priority: Medium     Severe NARCISA with sleep-associated hypoxemia, with likely REM-related hypoventilation  Polysomnography - Test date 8/9/2017  AHI 45.9, basline SpO2 92.9%, mike SpO2 54.9%, time of SpO2 <= 88% of 31.7 minutes.  Severe desaturations and sustained hypoxemia confined to singular supine REM period (no lateral REM observed for comparison).  Also seen to have TCM increase by ~15 mmHg over baseline in supine REM, consistent with hypoventilation.  Pre-study VBG was WNL.  CPAP titrated to 10 cm H2O and appeared optimal, including supine REM, with normalization of SpO2 and TCM normalized to low-40's.  Seen to have frequent PLM's (64.3 / hour on diagnostic, 89.6 / hour on treatment, ~20% associated with cortical arousals).         Benign essential hypertension 02/15/2016     Priority: Medium     Peroneal tendon rupture 11/23/2015     Priority: Medium     Morbid obesity (H) 08/24/2015     Priority: Medium     Oct 2016:  Starting [employer] program in [place], goal to lose 140 lbs over next 2 years       Papanicolaou smear of cervix with low grade squamous intraepithelial lesion (LGSIL) 08/03/2014     Priority: Medium     7/29/2014:Pap--LSIL. Neg high risk HPV. Plan cotest in 1 year (if this is ASCUS or LSIL then colp). In reminders  8/20/15: Pap - ASCUS, Neg HPV. Plan colp  9/14/15: [place] [facility]x & ECC - negative. Plan cotest in 1 year.   10/20/16: NIL Pap, Neg HPV. Plan cotest in 3 years.        Eating disorder 08/25/2013     Priority: Medium     Binge-eating disorder; social phobia  See psychological assessment from [facility], Dr. Anupama Bowie, 8/14/2013  Problem list name updated by automated process. Provider to review       Leg edema 04/19/2013     Priority: Medium     Dyspnea and respiratory abnormality 04/19/2013      Priority: Medium     Problem list name updated by automated process. Provider to review       Anxiety 06/28/2011     Priority: Medium     Followed by neurologist       Restless legs 06/28/2011     Priority: Medium     CARDIOVASCULAR SCREENING; LDL GOAL LESS THAN 160 10/31/2010     Priority: Medium     Constipation 09/18/2006     Priority: Medium     9/18/2006    Has tried otc suppository and otc lax.   Problem list name updated by automated process. Provider to review       Multiple sclerosis (H) 08/29/2005     Priority: Medium     9/18/200pt dx with MS 2004--dx by neurolgist and is followed by Dr. Jeet Nicholson, also MS nurse Heather Thomas       Polycystic ovaries 08/29/2005     Priority: Medium     Diagnosed in [place], had facial hair, overweight, carb craving, records being requested, never on metformin          Past Medical History:    Past Medical History:   Diagnosis Date     ASCUS favor benign 8/2015     H/O colposcopy with cervical biopsy 9/14/15     LSIL on Pap smear 7/2014     Multiple sclerosis (H) 8/29/2005     Shingles 10/2016     UNSPEC CONSTIPATION 9/18/2006       Past Surgical History:    Past Surgical History:   Procedure Laterality Date     CHOLECYSTECTOMY, LAPOROSCOPIC      Cholecystectomy, Laparoscopic     OPEN REDUCTION INTERNAL FIXATION TOE(S)  4/13/2012    Procedure:OPEN REDUCTION INTERNAL FIXATION TOE(S); Open reduction internal fixation right proximal fifth metatarsal fracture-   Anes-choice block; Surgeon:LEY, JEFFREY DUANE; Location:[facility] OR       Family History:    Family History   Problem Relation Age of Onset     Neurologic Disorder Father         MS     Heart Disease Father         irregular heart rate     Diabetes Father      Melanoma Father      Sleep Apnea Father      Cancer Maternal Grandfather         lung     Cancer Paternal Grandmother         stomach     Cancer Mother      Gynecology Maternal Aunt         PCOS       Social History:  Marital Status:  Single [1]  Social  "History     Tobacco Use     Smoking status: Never Smoker     Smokeless tobacco: Never Used   Substance Use Topics     Alcohol use: Yes     Comment: social     Drug use: No        Medications:    acetaminophen (TYLENOL) 500 MG tablet  Cholecalciferol (VITAMIN D3 PO)  desogestrel-ethinyl estradiol (APRI) 0.15-30 MG-MCG tablet  lisinopril (PRINIVIL/ZESTRIL) 20 MG tablet  metoprolol succinate ER (TOPROL-XL) 25 MG 24 hr tablet  Ocrelizumab (OCREVUS IV)  order for DME  sertraline (ZOLOFT) 100 MG tablet          Review of Systems   Constitutional: Positive for activity change (Chronically limited activity), fatigue and fever. Negative for appetite change, chills and diaphoresis.   HENT: Negative for congestion, sore throat and trouble swallowing.    Respiratory: Positive for cough (mild) and shortness of breath. Negative for chest tightness.    Cardiovascular: Positive for leg swelling (chronic). Negative for chest pain.   Gastrointestinal: Negative for abdominal pain, diarrhea (some loose stool currently - normal for her), nausea and vomiting.   Genitourinary: Positive for enuresis and frequency. Negative for dysuria.   Musculoskeletal: Positive for arthralgias (chronic hip pain) and gait problem (at baseline). Negative for back pain and neck stiffness.   Skin:        Patient not aware of any issues, but cannot self-evaluate well due to her obesity   Neurological: Negative for weakness, light-headedness, numbness and headaches.   Psychiatric/Behavioral: Positive for dysphoric mood.   All other systems reviewed and are negative.      Physical Exam   BP: (!) 179/135  Pulse: 125  Temp: 102.3  F (39.1  C)  Resp: (!) 36  Height: 160 cm (5' 3\")  Weight: (!) 181.4 kg (400 lb)  SpO2: 94 %      Physical Exam  Vitals signs and nursing note reviewed.   Constitutional:       Appearance: She is obese. She is not diaphoretic.      Comments: Patiently supine with slight elevation of the head.  Tachypneic and uncomfortable appearing but " conscious and alert and able to communicate in full sentences   HENT:      Head: Atraumatic.      Nose: Nose normal.      Mouth/Throat:      Mouth: Mucous membranes are moist.   Eyes:      Conjunctiva/sclera: Conjunctivae normal.   Neck:      Musculoskeletal: Normal range of motion.   Cardiovascular:      Rate and Rhythm: Regular rhythm. Tachycardia present.      Pulses: Normal pulses.   Pulmonary:      Breath sounds: Normal breath sounds.      Comments: Tachypneic  Abdominal:      General: There is no distension.      Palpations: Abdomen is soft.      Tenderness: There is no abdominal tenderness. There is no guarding.      Comments: Morbid obesity limits exam   Musculoskeletal:      Right lower leg: Edema present.      Left lower leg: Edema present.      Comments: Severe bilateral lower extremity lymphedema.  Right leg is notably erythematous and hot to the touch from the ankle of the leg with some extension into the lower abdomen, see photos below.  Exam suggestive of cellulitis.   Skin:     General: Skin is warm.   Neurological:      Mental Status: She is alert and oriented to person, place, and time.   Psychiatric:         Attention and Perception: Attention normal.         Mood and Affect: Mood is depressed. Affect is flat.         Behavior: Behavior is cooperative.                 Right leg    ED Course        Procedures         EKG Interpretation:      Interpreted by Lowell Hope MD  Time reviewed:0005   Symptoms at time of EKG: tachycardia   Rhythm: Sinus tachycardia  Rate: 133  Axis: Normal  Ectopy: None  Conduction: Normal and Right bundle branch block (incomplete)  ST Segments/ T Waves: No acute ischemic changes  Q Waves: None  Comparison to prior: Compared to previous EKG 9/18/2006, comparison limited due to significant rate change    Clinical Impression: Sinus tachycardia without acute ischemic abnormality                        Results for orders placed or performed during the hospital  encounter of 03/04/21 (from the past 24 hour(s))   Symptomatic Influenza A/B & SARS-CoV2 (COVID-19) Virus PCR Multiplex    Specimen: Nasopharyngeal   Result Value Ref Range    Flu A/B & SARS-COV-2 PCR Source Nasopharyngeal     SARS-CoV-2 PCR Result NEGATIVE     Influenza A PCR Negative NEG^Negative    Influenza B PCR Negative NEG^Negative    Respiratory Syncytial Virus PCR (Note)     Flu A/B & SARS-CoV-2 PCR Comment (Note)    CBC with platelets differential   Result Value Ref Range    WBC 10.9 4.0 - 11.0 10e9/L    RBC Count 5.48 (H) 3.8 - 5.2 10e12/L    Hemoglobin 14.3 11.7 - 15.7 g/dL    Hematocrit 47.4 (H) 35.0 - 47.0 %    MCV 87 78 - 100 fl    MCH 26.1 (L) 26.5 - 33.0 pg    MCHC 30.2 (L) 31.5 - 36.5 g/dL    RDW 15.5 (H) 10.0 - 15.0 %    Platelet Count 284 150 - 450 10e9/L    Diff Method Automated Method     % Neutrophils 85.9 %    % Lymphocytes 6.4 %    % Monocytes 6.0 %    % Eosinophils 0.7 %    % Basophils 0.5 %    % Immature Granulocytes 0.5 %    Nucleated RBCs 0 0 /100    Absolute Neutrophil 9.4 (H) 1.6 - 8.3 10e9/L    Absolute Lymphocytes 0.7 (L) 0.8 - 5.3 10e9/L    Absolute Monocytes 0.7 0.0 - 1.3 10e9/L    Absolute Eosinophils 0.1 0.0 - 0.7 10e9/L    Absolute Basophils 0.1 0.0 - 0.2 10e9/L    Abs Immature Granulocytes 0.1 0 - 0.4 10e9/L    Absolute Nucleated RBC 0.0    Comprehensive metabolic panel   Result Value Ref Range    Sodium 138 133 - 144 mmol/L    Potassium 4.1 3.4 - 5.3 mmol/L    Chloride 104 94 - 109 mmol/L    Carbon Dioxide 28 20 - 32 mmol/L    Anion Gap 6 3 - 14 mmol/L    Glucose 110 (H) 70 - 99 mg/dL    Urea Nitrogen 15 7 - 30 mg/dL    Creatinine 0.69 0.52 - 1.04 mg/dL    GFR Estimate >90 >60 mL/min/[1.73_m2]    GFR Estimate If Black >90 >60 mL/min/[1.73_m2]    Calcium 9.5 8.5 - 10.1 mg/dL    Bilirubin Total 1.7 (H) 0.2 - 1.3 mg/dL    Albumin 3.6 3.4 - 5.0 g/dL    Protein Total 8.5 6.8 - 8.8 g/dL    Alkaline Phosphatase 67 40 - 150 U/L    ALT 20 0 - 50 U/L    AST 17 0 - 45 U/L   Lactic acid  whole blood   Result Value Ref Range    Lactic Acid 1.3 0.7 - 2.0 mmol/L   Blood gas venous   Result Value Ref Range    Ph Venous 7.36 7.32 - 7.43 pH    PCO2 Venous 56 (H) 40 - 50 mm Hg    PO2 Venous 18 (L) 25 - 47 mm Hg    Bicarbonate Venous 32 (H) 21 - 28 mmol/L    Base Excess Venous 4.3 mmol/L    FIO2 32%    D dimer quantitative   Result Value Ref Range    D Dimer 0.8 (H) 0.0 - 0.50 ug/ml FEU   XR Chest Port 1 View    Narrative    EXAM: XR CHEST PORT 1 VW  LOCATION: [facility]  DATE/TIME: 3/4/2021 9:59 PM    INDICATION: Fever.  COMPARISON: 06/14/2013. The heart size is normal. There is a mild right basilar infiltrate and a infiltrate in the left midlung. Right hemidiaphragm is elevated. No pneumothorax.      Impression    IMPRESSION: Bilateral pulmonary infiltrates may be pneumonia.   UA with Microscopic   Result Value Ref Range    Color Urine Yellow     Appearance Urine Clear     Glucose Urine Negative NEG^Negative mg/dL    Bilirubin Urine Negative NEG^Negative    Ketones Urine 5 (A) NEG^Negative mg/dL    Specific Gravity Urine 1.023 1.003 - 1.035    Blood Urine Moderate (A) NEG^Negative    pH Urine 5.0 5.0 - 7.0 pH    Protein Albumin Urine 100 (A) NEG^Negative mg/dL    Urobilinogen mg/dL 4.0 (H) 0.0 - 2.0 mg/dL    Nitrite Urine Negative NEG^Negative    Leukocyte Esterase Urine Trace (A) NEG^Negative    Source Catheterized Urine     WBC Urine 28 (H) 0 - 5 /HPF    RBC Urine 22 (H) 0 - 2 /HPF    Bacteria Urine Few (A) NEG^Negative /HPF    Mucous Urine Present (A) NEG^Negative /LPF   CT Chest Pulmonary Embolism w Contrast    Narrative    EXAM: CT CHEST PULMONARY EMBOLISM W CONTRAST  LOCATION: [facility]  DATE/TIME: 3/5/2021 1:16 AM    INDICATION: PE suspected, high prob  COMPARISON: None.  TECHNIQUE: CT chest pulmonary angiogram during arterial phase injection of IV contrast. Multiplanar reformats and MIP reconstructions were performed. Dose reduction techniques were used.   CONTRAST: 96  mL Isovue-370    FINDINGS:  ANGIOGRAM CHEST: No evidence for pulmonary embolism. Pulmonary arteries markedly dilated. Thoracic aorta normal in caliber. No aortic dissection or other acute abnormality.    HEART: Mild right atrial and ventricular enlargement. Mild straightening of the interventricular septum. Moderate mitral annular calcification. No coronary artery calcification.    LUNGS AND PLEURA: Mosaic attenuation throughout the lungs. No pulmonary mass, consolidation, or suspicious pulmonary nodule. No pleural effusion or pneumothorax.    MEDIASTINUM: No adenopathy or mass.    LIMITED UPPER ABDOMEN: Hepatomegaly. Prior cholecystectomy.    MUSCULOSKELETAL: Negative.      Impression    IMPRESSION:  1.  No evidence for pulmonary embolism.   2.  Pulmonary arterial hypertension.       Medications   sodium chloride (PF) 0.9% PF flush 3 mL (has no administration in time range)   sodium chloride (PF) 0.9% PF flush 3 mL (has no administration in time range)   HYDROmorphone (PF) (DILAUDID) injection 0.5 mg (0.5 mg Intravenous Given 3/4/21 2215)   piperacillin-tazobactam (ZOSYN) intermittent infusion 4.5 g (0 g Intravenous Stopped 3/4/21 2339)   lactated ringers BOLUS 1,572 mL (0 mLs Intravenous Stopped 3/5/21 0009)   ibuprofen (ADVIL/MOTRIN) tablet 800 mg (800 mg Oral Given 3/4/21 2220)   vancomycin (VANCOCIN) 2,500 mg in sodium chloride 0.9 % 500 mL intermittent infusion (0 mg Intravenous ED Infusing on Admission/transfer 3/5/21 0154)   iopamidol (ISOVUE-370) solution 96 mL (96 mLs Intravenous Given 3/5/21 0118)   sodium chloride 0.9 % bag 500mL for CT scan flush use (100 mLs Intravenous Given 3/5/21 0118)     9:55 PM: Lactic normal. White count normal, but neutrophils elevated.     11:51 PM: Discussed with hospitalist. Accepts for admission. EKG ordered for ongoing tachycardia. Discussed antibiotics initiated.  Discussed x-ray findings for possible pneumonia although clinical history not highly suggestive of  pneumonia.    12:33 AM: Continues to be tachycardic and  tachypneic.  Patient at moderate risk for DVT and PE given her minimal mobility.  D-dimer added on for risk stratification but may need a CT of the chest.    12:44 AM: Discussed with hospitalist, has been reviewed the case, patient is likely truly high risk for DVT/PE.  Will obtain a CT chest.    1:56 AM; CT negative for PE.     Assessments & Plan (with Medical Decision Making)  45-year-old female with morbid obesity, chronic severe lymphedema, and multiple sclerosis on ocrelizumab presented for evaluation of fever and chills tonight.  Patient without any other specific new symptoms other than a mild cough but on exam she has notably erythematous and hot lower extremities more specifically the right leg with some active drainage from behind the leg suggestive of cellulitis as a likely primary cause for her infectious symptoms tonight.  Full septic screening work-up done including urine, chest x-ray, and Covid swab.  Blood cultures obtained prior to antibiotics.  Started empirically on vancomycin and Zosyn for presumed skin and soft tissue cellulitis with sepsis.  Patient tachycardic and febrile to 102.3 upon arrival.  Not complaining of significant respiratory issues other than the mild ongoing cough however she is tachycardic and oxygen saturations are in the low end of normal.  This could be from obesity hypoventilation but also raises concern for possible PE.  Moderate risk by Wells criteria so D-dimer was added on.  D-dimer mildly elevated, CT PE was performed.  CT showed no evidence of PE or pneumonia.  Patient admitted for further management of infection most likely secondary to cellulitis of her lower legs.     I have reviewed the nursing notes.    I have reviewed the findings, diagnosis, plan and need for follow up with the patient.       New Prescriptions    No medications on file       Final diagnoses:   Cellulitis of right leg   SIRS (systemic  inflammatory response syndrome) (H)   Tachycardia       3/4/2021   [facility] EMERGENCY DEPT     Hope, Lowell Alicia MD  03/05/21 0153

## 2021-03-05 NOTE — PROGRESS NOTES
Chart accessed when blue slip was sent to the medical surgical floor indicating a high likelihood of the patient being admitted. Recorder was working as charge and reviewing the chart.

## 2021-03-05 NOTE — PROGRESS NOTES
03/05/21 1000   Quick Adds   Type of Visit Initial Occupational Therapy Evaluation   Living Environment   People in home alone   Current Living Arrangements other (see comments)  ([facility])   Self-Care   Equipment Currently Used at Home walker, standard;wheelchair, power;shower chair   Disability/Function   Wear Glasses or Blind no   Concentrating, Remembering or Making Decisions Difficulty no   Difficulty Communicating no   Difficulty Eating/Swallowing no   Walking or Climbing Stairs Difficulty yes   Walking or Climbing Stairs ambulation difficulty, dependent   Mobility Management Pt uses walker to get from bed to commode. Does have to walk 5-10 steps to get into the shower from the doorway of the bathroom.    Dressing/Bathing Difficulty yes   Dressing/Bathing dressing difficulty, requires equipment   Toileting issues yes   Toileting Management uses commode as unable to walk to bathroom    Toileting Assistance toileting difficulty, requires equipment   Doing Errands Independently Difficulty (such as shopping) yes   Errands Management orders all groceries online.    Fall history within last six months no   General Information   Onset of Illness/Injury or Date of Surgery 03/04/21   Referring Physician Alanis Taylor PA-C   Patient/Family Therapy Goal Statement (OT) to return home, increase strength. Open to going to TCU if needed.    Additional Occupational Profile Info/Pertinent History of Current Problem Bess Enamorado is a 45 year old female admitted on 3/4/2021 Cellulitis of right leg and acute respiratory failure with hypoxemia.   Existing Precautions/Restrictions no known precautions/restrictions   Cognitive Status Examination   Orientation Status orientation to person, place and time   Pain Assessment   Patient Currently in Pain Yes, see Vital Sign flowsheet  (L leg spasms. )   Range of Motion Comprehensive   Comment, General Range of Motion B UE ROM: WNL   Strength Comprehensive (MMT)   Comment,  General Manual Muscle Testing (MMT) Assessment B UE strength: WNL   Bed Mobility   Comment (Bed Mobility) Mod A- needs assist to slide L LE- is able to move R LE independently. Once LE's are off bed. Pt was able to move trunk independently by just pulling up on therapist    Transfers   Transfer Comments Unable to complete sit to stand. Pt unable to return supine with cues. Pt's buttocks sliding off edge of bed- assisted pt to ground with therapist and charge RN present. compass report complete.    Upper Body Dressing Assessment/Training   [place] Level (Upper Body Dressing) independent   Lower Body Dressing Assessment/Training   [place] Level (Lower Body Dressing) dependent (less than 25% patient effort)   Clinical Impression   Criteria for Skilled Therapeutic Interventions Met (OT) yes;skilled treatment is necessary   OT Diagnosis decreased independence with ADLs and functional mobility    OT Problem List-Impairments impacting ADL activity tolerance impaired;strength;pain   Assessment of Occupational Performance 3-5 Performance Deficits   Identified Performance Deficits dressing, toilet transfer    Planned Therapy Interventions (OT) ADL retraining;progressive activity/exercise   Clinical Decision Making Complexity (OT) low complexity   Therapy Frequency (OT) Daily   Predicted Duration of Therapy 2-3 days   Anticipated Equipment Needs Upon Discharge (OT)   (TBD at TCU)   Risk & Benefits of therapy have been explained evaluation/treatment results reviewed;care plan/treatment goals reviewed;risks/benefits reviewed;current/potential barriers reviewed;participants voiced agreement with care plan;participants included;patient   OT Discharge Planning    OT Discharge Recommendation (DC Rec) Transitional Care Facility   OT Rationale for DC Rec decreased independence with ADLs and functional mobility    Total Evaluation Time (Minutes)   Total Evaluation Time (Minutes) 10

## 2021-03-06 ENCOUNTER — APPOINTMENT (OUTPATIENT)
Dept: OCCUPATIONAL THERAPY | Facility: CLINIC | Age: 46
DRG: 872 | End: 2021-03-06
Payer: COMMERCIAL

## 2021-03-06 ENCOUNTER — APPOINTMENT (OUTPATIENT)
Dept: PHYSICAL THERAPY | Facility: CLINIC | Age: 46
DRG: 872 | End: 2021-03-06
Payer: COMMERCIAL

## 2021-03-06 LAB
ANION GAP SERPL CALCULATED.3IONS-SCNC: 3 MMOL/L (ref 3–14)
BUN SERPL-MCNC: 13 MG/DL (ref 7–30)
CALCIUM SERPL-MCNC: 8.9 MG/DL (ref 8.5–10.1)
CHLORIDE SERPL-SCNC: 103 MMOL/L (ref 94–109)
CO2 SERPL-SCNC: 28 MMOL/L (ref 20–32)
CREAT SERPL-MCNC: 0.67 MG/DL (ref 0.52–1.04)
CRP SERPL-MCNC: 144 MG/L (ref 0–8)
ERYTHROCYTE [DISTWIDTH] IN BLOOD BY AUTOMATED COUNT: 16 % (ref 10–15)
GFR SERPL CREATININE-BSD FRML MDRD: >90 ML/MIN/{1.73_M2}
GLUCOSE SERPL-MCNC: 93 MG/DL (ref 70–99)
HCT VFR BLD AUTO: 39.6 % (ref 35–47)
HGB BLD-MCNC: 12 G/DL (ref 11.7–15.7)
MCH RBC QN AUTO: 25.6 PG (ref 26.5–33)
MCHC RBC AUTO-ENTMCNC: 30.3 G/DL (ref 31.5–36.5)
MCV RBC AUTO: 84 FL (ref 78–100)
PLATELET # BLD AUTO: 198 10E9/L (ref 150–450)
POTASSIUM SERPL-SCNC: 4.1 MMOL/L (ref 3.4–5.3)
RBC # BLD AUTO: 4.69 10E12/L (ref 3.8–5.2)
SODIUM SERPL-SCNC: 134 MMOL/L (ref 133–144)
WBC # BLD AUTO: 7.3 10E9/L (ref 4–11)

## 2021-03-06 PROCEDURE — 85027 COMPLETE CBC AUTOMATED: CPT | Performed by: PHYSICIAN ASSISTANT

## 2021-03-06 PROCEDURE — 86140 C-REACTIVE PROTEIN: CPT | Performed by: PHYSICIAN ASSISTANT

## 2021-03-06 PROCEDURE — 120N000001 HC R&B MED SURG/OB

## 2021-03-06 PROCEDURE — 999N000111 HC STATISTIC OT IP EVAL DEFER

## 2021-03-06 PROCEDURE — 250N000009 HC RX 250: Performed by: FAMILY MEDICINE

## 2021-03-06 PROCEDURE — 250N000013 HC RX MED GY IP 250 OP 250 PS 637: Performed by: PHYSICIAN ASSISTANT

## 2021-03-06 PROCEDURE — 999N000123 HC STATISTIC OXYGEN O2DAILY TECH TIME

## 2021-03-06 PROCEDURE — 999N000157 HC STATISTIC RCP TIME EA 10 MIN

## 2021-03-06 PROCEDURE — 97530 THERAPEUTIC ACTIVITIES: CPT | Mod: GP | Performed by: PHYSICAL THERAPIST

## 2021-03-06 PROCEDURE — 97162 PT EVAL MOD COMPLEX 30 MIN: CPT | Mod: GP | Performed by: PHYSICAL THERAPIST

## 2021-03-06 PROCEDURE — 250N000011 HC RX IP 250 OP 636: Performed by: INTERNAL MEDICINE

## 2021-03-06 PROCEDURE — 250N000011 HC RX IP 250 OP 636: Performed by: PHYSICIAN ASSISTANT

## 2021-03-06 PROCEDURE — 250N000013 HC RX MED GY IP 250 OP 250 PS 637: Performed by: INTERNAL MEDICINE

## 2021-03-06 PROCEDURE — 250N000011 HC RX IP 250 OP 636: Performed by: FAMILY MEDICINE

## 2021-03-06 PROCEDURE — 99233 SBSQ HOSP IP/OBS HIGH 50: CPT | Performed by: FAMILY MEDICINE

## 2021-03-06 PROCEDURE — 250N000013 HC RX MED GY IP 250 OP 250 PS 637: Performed by: FAMILY MEDICINE

## 2021-03-06 PROCEDURE — 97110 THERAPEUTIC EXERCISES: CPT | Mod: GO

## 2021-03-06 PROCEDURE — 36415 COLL VENOUS BLD VENIPUNCTURE: CPT | Performed by: PHYSICIAN ASSISTANT

## 2021-03-06 PROCEDURE — 80048 BASIC METABOLIC PNL TOTAL CA: CPT | Performed by: PHYSICIAN ASSISTANT

## 2021-03-06 PROCEDURE — 94640 AIRWAY INHALATION TREATMENT: CPT

## 2021-03-06 PROCEDURE — 999N000097 HC STATISTIC MECHANICAL IN-EXSUFFLATION TREATMENT

## 2021-03-06 PROCEDURE — 94640 AIRWAY INHALATION TREATMENT: CPT | Mod: 76

## 2021-03-06 RX ORDER — TRIAMCINOLONE ACETONIDE 5 MG/G
CREAM TOPICAL 2 TIMES DAILY
Status: DISCONTINUED | OUTPATIENT
Start: 2021-03-06 | End: 2021-03-08 | Stop reason: HOSPADM

## 2021-03-06 RX ORDER — GABAPENTIN 100 MG/1
100 CAPSULE ORAL 3 TIMES DAILY
Status: DISCONTINUED | OUTPATIENT
Start: 2021-03-06 | End: 2021-03-07

## 2021-03-06 RX ADMIN — IPRATROPIUM BROMIDE AND ALBUTEROL SULFATE 3 ML: .5; 3 SOLUTION RESPIRATORY (INHALATION) at 19:12

## 2021-03-06 RX ADMIN — Medication 2.5 MG: at 03:13

## 2021-03-06 RX ADMIN — MICONAZOLE NITRATE: 20 POWDER TOPICAL at 07:59

## 2021-03-06 RX ADMIN — SERTRALINE HYDROCHLORIDE 100 MG: 100 TABLET ORAL at 07:59

## 2021-03-06 RX ADMIN — TRIAMCINOLONE ACETONIDE: 5 CREAM TOPICAL at 21:41

## 2021-03-06 RX ADMIN — GABAPENTIN 100 MG: 100 CAPSULE ORAL at 21:39

## 2021-03-06 RX ADMIN — ENOXAPARIN SODIUM 40 MG: 100 INJECTION SUBCUTANEOUS at 17:53

## 2021-03-06 RX ADMIN — LISINOPRIL 20 MG: 20 TABLET ORAL at 07:58

## 2021-03-06 RX ADMIN — Medication 2.5 MG: at 08:02

## 2021-03-06 RX ADMIN — IPRATROPIUM BROMIDE AND ALBUTEROL SULFATE 3 ML: .5; 3 SOLUTION RESPIRATORY (INHALATION) at 15:16

## 2021-03-06 RX ADMIN — GABAPENTIN 100 MG: 100 CAPSULE ORAL at 14:00

## 2021-03-06 RX ADMIN — METOPROLOL SUCCINATE 25 MG: 25 TABLET, EXTENDED RELEASE ORAL at 07:58

## 2021-03-06 RX ADMIN — CEFTRIAXONE SODIUM 2 G: 2 INJECTION, SOLUTION INTRAVENOUS at 22:18

## 2021-03-06 RX ADMIN — ENOXAPARIN SODIUM 40 MG: 40 INJECTION SUBCUTANEOUS at 06:09

## 2021-03-06 RX ADMIN — MICONAZOLE NITRATE: 20 POWDER TOPICAL at 21:39

## 2021-03-06 RX ADMIN — IPRATROPIUM BROMIDE AND ALBUTEROL SULFATE 3 ML: .5; 3 SOLUTION RESPIRATORY (INHALATION) at 07:58

## 2021-03-06 ASSESSMENT — ACTIVITIES OF DAILY LIVING (ADL)
ADLS_ACUITY_SCORE: 21
ADLS_ACUITY_SCORE: 19
ADLS_ACUITY_SCORE: 19
ADLS_ACUITY_SCORE: 21
ADLS_ACUITY_SCORE: 19
ADLS_ACUITY_SCORE: 21

## 2021-03-06 NOTE — PROGRESS NOTES
03/06/21 1030   Quick Adds   Type of Visit Initial PT Evaluation   Living Environment   People in home alone   Current Living Arrangements other (see comments)  (town home)   Home Accessibility wheelchair accessible   Transportation Anticipated agency   Living Environment Comments has a ramp to enter her home and then everything is on one level    Self-Care   Equipment Currently Used at Home walker, standard;wheelchair, power;shower chair   Disability/Function   Hearing Difficulty or Deaf no   Wear Glasses or Blind no   Walking or Climbing Stairs Difficulty yes   Walking or Climbing Stairs ambulation difficulty, requires equipment   Mobility Management was using walker but has gradually gotten worse over the past 2 months with endurance and strength    Dressing/Bathing Difficulty yes   Toileting issues yes   Toileting Management was using towels at home    Fall history within last six months no   Change in Functional Status Since Onset of Current Illness/Injury yes   General Information   Onset of Illness/Injury or Date of Surgery 03/04/21   Referring Physician Alanis Taylor    Patient/Family Therapy Goals Statement (PT) to improve strength    Pertinent History of Current Problem (include personal factors and/or comorbidities that impact the POC) Bess Enamorado is a 45 year old female admitted on 3/4/2021 Cellulitis of right leg and acute respiratory failure with hypoxemia   Existing Precautions/Restrictions fall   Cognition   Orientation Status (Cognition) oriented x 4   Affect/Mental Status (Cognition) WNL   Integumentary/Edema   Integumentary/Edema Comments cellulitis to B LE with edema, nodules across both legs and weeping.    Range of Motion (ROM)   ROM Comment AROM limited due to soft tissue restrictions and edema. knee flexion limited to 80 degrees B, hip flexion to 80 degrees B    Strength   Strength Comments hip flexion: right 3+/5, left 2+/5, knee flexion right 4-/5, left 3+/5, knee extension right 4/5,  left 2+/5, ankle DF right 4/5, left 3+/5    Bed Mobility   Bed Mobility rolling left;rolling right   Rolling Left [place] (Bed Mobility) moderate assist (50% patient effort);2 person assist   Rolling Right [place] (Bed Mobility) moderate assist (50% patient effort);2 person assist   Bed Mobility Limitations decreased ability to use legs for bridging/pushing;decreased ability to use arms for pushing/pulling;impaired ability to control trunk for mobility   Impairments Contributing to Impaired Bed Mobility decreased strength   Assistive Device (Bed Mobility) bed rails;draw sheet;lift device   Transfers   Transfers bed-chair transfer   Transfer Safety Comments attempted sit <> stand but patient unable to scoot forward in chair due to chair restricting movement due to being too small. Nursing notified and they will look for a larger chair for patient.    Bed-Chair Transfer   Bed-Chair [place] (Transfers) dependent (less than 25% patient effort);other (see comments)  (lb lift)   Gait/Stairs (Locomotion)   [place] Level (Gait) not tested   Sensory Examination   Sensory Perception Comments in tact to light touch, sensitive to pressure and moving ankle while donning socks due to skin lesions   Clinical Impression   Criteria for Skilled Therapeutic Intervention yes, treatment indicated   PT Diagnosis (PT) generalized weakness    Influenced by the following impairments weakness, decreased activity tolerance, decreased ROM    Functional limitations due to impairments bed mobility, transfers, gaits, safety, fall risk    Clinical Presentation Evolving/Changing   Clinical Presentation Rationale clinical decision making and chart review    Clinical Decision Making (Complexity) moderate complexity   Therapy Frequency (PT) Daily   Predicted Duration of Therapy Intervention (days/wks) 5 days   Planned Therapy Interventions (PT) balance training;bed mobility training;gait training;home exercise  program;neuromuscular re-education;ROM (range of motion);strengthening;stretching;transfer training   Risk & Benefits of therapy have been explained evaluation/treatment results reviewed;care plan/treatment goals reviewed;risks/benefits reviewed;current/potential barriers reviewed;participants voiced agreement with care plan;participants included;patient   PT Discharge Planning    PT Discharge Recommendation (DC Rec) Transitional Care Facility   PT Rationale for DC Rec patient unable to return home due to current impairments and limited mobility    PT Brief overview of current status  maxA x 2 for rolling in bed, needing lb lift for bed > chair transfers, unable to try sit <>stand today due to Soft tissue restrictions in a chair that was too small. Nursing plans to find larger more comfortable chair for patient    Total Evaluation Time   Total Evaluation Time (Minutes) 6       Jessica Guzman  PT, DPT       3/6/2021   [address] [address]  [email or web address]  Voicemail: [phone]

## 2021-03-06 NOTE — PLAN OF CARE
Patient placed in bariatric bed with pulsate mattress with use of ceiling lift. Not to have open area on her left ankle. Patient states it has been there prior to admission as she hit it on her wheelchair. Foam dressing applied. Wound consult placed.

## 2021-03-06 NOTE — PROGRESS NOTES
DATE:  3/6/2021   TIME OF RECEIPT FROM LAB:  5515  LAB TEST:  Blood Culture Left arm =3/4/21  LAB VALUE:    Culture Micro Abnormal  03/04/2021 10:16 PM [facility]   Cultured on the 2nd day of incubation:   Gram positive cocci in pairs and chains        RESULTS GIVEN WITH READ-BACK TO (PROVIDER):  Frank Wallace MD  TIME LAB VALUE REPORTED TO PROVIDER:   3321

## 2021-03-06 NOTE — PROGRESS NOTES
Lymphedema Therapy  Orders received, chart reviewed and pt visited by an on-site lymphedema therapist today for face-to-face discussion in her room (this writer off-site but spoke directly with therapist who saw and spoke to pt). Pt is known to this provider from OP lymphedema therapy from Nov-Dec 2018. Patient with chronic stage3 lymphedema of BLEs with an acute RLE flareup from cellulitis.      Due to severity and chronicity of pt's lymphedema, appropriate treatment for this patient would be thigh high wrapping via gradient compression bandages which, per Hamler protocol, cannot be initiated until 48-72 hours after IV antibiotics started (per RN started evening of 3/4/21) with reduction in redness of limb.  Another factor taken highly into consideration is this patient's safety and mobility as wrapping of LEs (even knee hi wrapping) can create immobility and/or balance impairments. Pt with known fall earlier today in room.    When discussing treatment with patient (which patient is very familiar with as has done wrapping in the past), pt wanting to hold on initiating wrapping while in hospital and initiate while at TCU or in home care (pending if pt discharges to TCU or directly home).     Will sign-off on orders at this time.

## 2021-03-06 NOTE — PLAN OF CARE
Patient repositioned q2h and did c/o hip pain during turning. Oxycodone 2.5mg given with minimal relief. Brar catheter remains patent and intact with good urine outflow.

## 2021-03-06 NOTE — PROGRESS NOTES
Care Management Follow Up    Length of Stay (days): 2    Expected Discharge Date: 03/07/21     Concerns to be Addressed: discharge planning     Patient plan of care discussed at interdisciplinary rounds: Yes    Anticipated Discharge Disposition: Skilled Nursing Facilty, Transitional Care     Anticipated Discharge Services: None  Anticipated Discharge DME: Oxygen    Patient/family educated on Medicare website which has current facility and service quality ratings: yes  Education Provided on the Discharge Plan:    Patient/Family in Agreement with the Plan: yes    Referrals Placed by CM/SW: Post Acute Facilities  Private pay costs discussed: Not applicable    Additional Information:    Per the discussion in Interdisciplinary Rounds, the patient will be ready for discharge 3/7.    Spoke to Jose L Noble regarding the discharge plan.  Per Aide the patient is accepted 3/7.    Plan:  Jose L Liriano RN

## 2021-03-06 NOTE — PROGRESS NOTES
Received call form Jennyfer with Infections Disease. +BS that grew out gram (-) rods on 3/4; specific bacteria has not been identified and further work-up is on-going.     Did request for Tele-med to review and as I was receiving a msg from Dr Arias with no change in orders at this time. It was reviewed by Dr Murrell, who also verified no new orders at this time.

## 2021-03-06 NOTE — PROGRESS NOTES
Date of Admission:  3/4/2021     Assessment & Plan         Bess Enamorado is a 45 year old female admitted on 3/4/2021. She has history of MS, hypertension, obesity and lymphedema. She presents with cellulitis of right leg and acute respiratory failure with hypoxemia.     Sepsis due to gram-negative juan manuel bacteremia, UTI versus cellulitis  Patient meets SIRs criteria with Temp 102.3, HR 120s. WBC normal, blood pressure normal and lactic normal.   --some evidence of cellulitis initially tho on day 2 the erythema seemed equal on both sides so may be all stasis dermatitis   --UTI most likely source with GNR bacteremia.  UA also abnormal with 28 WBC, bacteria, leukocyte esterase, and 22 RBC, though no clear symptoms.   --CT chest negative for infectious process. IVF resuscitation 1.5 L in ED. Early antibiotic with IV vancomycin and IV Zoysn.     Possible Cellulitis of the right lower extremity   Photographed on presentation to the ED, erythema appears slightly improved on 3/5/21.   - pain: acetaminophen prn, ibuprofen prn, oxycodone prn only for breakthrough  - elevate legs, will trial compression for chronic edema if tolerated  - See photograph taken on presentation in ED providers note   - jiang in place to promote skin healing, noted blistering/nodules of inner thighs changed to [place] 3/6          Acute Respiratory Failure with Hypoxemia, on chronic  Oxygen saturation 76%, started on 4 L of oxygen with no home oxygen requirement.  Increased work of breathing on exam though patient states she is at her baseline.  Lungs sound distant though clear.  VBG show mild chronic respiratory failure with compensated hypercapnia (pH 7.36, CO2 56). VS show tachycardia otherwise stable. Troponin negative. ECG shows sinus tachcycardia with diffuse nonspecific T wave changes, no acute ischemic ST changes. CT PE study shows no signs of acute process including pneumonia or PE. Suspect multifactorial related to untreated sleep apnea,  obesity hypoventilation syndrome, as well as narcotics.   Able to titrate oxygen down to 1.5 L this morning with oxygen level of 90-93%.  - continue O2 therapy, titrate to maintain sats > 91%  - encouraged home CPAP use  - reduce narcotics as able  - incentive spirometry ordered     Generalized Weakness  Increased weakness in the setting of illness. At baseline using wheel chair and walker to get around due to pain and reduced mobility. Patient has been homebound for the past year due to mobility issues, pain and weight gain.  - PT/OT/care transitions consulted  -Her acute weakness is clearly exacerbated by the bacteremia/sepsis.  Will need to pursue PT/OT again as her general status improves with antibiotics      NARCISA  Suspected obesity hypoventilation syndrome  Not compliant with home CPAP. Discussed importance of CPAP with the patient.    - Continue home CPAP if available     Fall  Patient had a fall on 3/5/21 in the hospital during OT evaluation. Slid down to the ground, landing on her bottom. No significant impact though left leg was somewhat awkwardly positioned. Currently at rest she does not have significant pain. She does have chronic left hip pain with movement, she currently continues to have left hip pain with movement that is unchanged from her baseline though significant. No significant pain in ankle or knee with attempted ROM though exam difficult due to body habitus. As below she has known osteoarthritis and multiple hip fractures per MR in 2019.   - portable XRAY pelvis/left hip      Lymphedema/severe stasis dermatitis  Chronic. Previously has followed with outpatient lymphedema therapy.  - continue compression therapy as tolerated, lymphedema consutled  -Adding triamcinolone cream to areas of redness twice daily     Hypertension  Chronic. Blood pressures reviewed, stable.   - continue home lisinopril and metoprolol with parameters     Multiple sclerosis   On chronic treatment with Ocrelizumab. Has  "been using wheel chair and walker to get around due to pain and reduced mobility due to MS and OA as below.     Osteoarthritis of left hip  Issues with left hip pain increasing over the past year, greatly affecting mobility. MR has previously shown labrum dengeneration, osteoarthritic changes, and multiple pelvic fractures.      Depression  - continue home zoloft     Obesity  Body mass index is 77.32 kg/m . Contributes to above.     COVID-19 Ruled Out, negative PCR on presentation on 3/4/21. CT without signs of infection. As above is hypoxemic and has had increase in chronic dry cough though low suspicion for infection in patient who is home bound with minimal exposures with no radiographic signs of COVID on CT.  - no precautions     Fall/in hospital  -Slid off off the bed gently to the floor on the first day of hospitalization  -Unable to get up without using the Elidia lift  -Hip and pelvis x-rays suboptimal due to obesity and portable technique but no clear evidence of fracture and no significant increase in pain from her baseline     Diet: Low Saturated Fat Na <2400 mg    DVT Prophylaxis: Enoxaparin (Lovenox) SQ  Brar Catheter: in place, indication: Other (Comment)  Code Status: Full Code            Disposition Plan  We will need several days of IV antibiotics for gram-negative juan manuel bacteremia/sepsis.  Will need TCU for underlying weakness    SUBJECTIVE:   Still has the spasm of pain in the legs, we did not start the gabapentin yesterday  Appetite has been okay all along  Only low-grade fever last night  Still unable to get up from the chair with PT     ROS:4 point ROS including Respiratory, CV, GI and , other than that noted in the HPI,  is negative     OBJECTIVE:   BP (!) 146/84 (BP Location: Right arm)   Pulse 100   Temp 98.8  F (37.1  C) (Oral)   Resp 20   Ht 1.6 m (5' 3\")   Wt (!) 198 kg (436 lb 8.2 oz)   SpO2 93%   BMI 77.32 kg/m      GENERAL APPEARANCE: Awake, alert, no apparent distress, in the " chair     RESP: Clear     CV: regular rate and rhythm, no murmur , edema: Massive 4+ both lower extremities and pannus, chronic appearing     Abdomen: soft, nontender, no liver or spleen enlargement, no masses, BSs normal   Skin: no cyanosis, pallor, or jaundice  Diffuse erythema with woody induration both legs up to the groin and the lower pannus.  Today there does not appear to be a significant difference in the redness side-to-side    CMP  Recent Labs   Lab 03/06/21 0611 03/04/21 2127    138   POTASSIUM 4.1 4.1   CHLORIDE 103 104   CO2 28 28   ANIONGAP 3 6   GLC 93 110*   BUN 13 15   CR 0.67 0.69   GFRESTIMATED >90 >90   GFRESTBLACK >90 >90   EVITA 8.9 9.5   PROTTOTAL  --  8.5   ALBUMIN  --  3.6   BILITOTAL  --  1.7*   ALKPHOS  --  67   AST  --  17   ALT  --  20     CBC  Recent Labs   Lab 03/06/21 0611 03/04/21 2127   WBC 7.3 10.9   RBC 4.69 5.48*   HGB 12.0 14.3   HCT 39.6 47.4*   MCV 84 87   MCH 25.6* 26.1*   MCHC 30.3* 30.2*   RDW 16.0* 15.5*    284     INRNo lab results found in last 7 days.  Arterial BloodGas  Recent Labs   Lab 03/04/21 2127   O2PER 32%      Venous Blood Gas  Recent Labs   Lab 03/04/21 2127   PHV 7.36   PCO2V 56*   PO2V 18*   HCO3V 32*   LETTY 4.3   O2PER 32%       Medications     cefTRIAXone  2 g Intravenous Q24H     enoxaparin ANTICOAGULANT  40 mg Subcutaneous Q12H     gabapentin  100 mg Oral TID     influenza quadrivalent (PF) vacc  0.5 mL Intramuscular Prior to discharge     ipratropium - albuterol 0.5 mg/2.5 mg/3 mL  3 mL Nebulization 4x daily     lisinopril  20 mg Oral Daily     metoprolol succinate ER  25 mg Oral Daily     miconazole   Topical BID     sertraline  100 mg Oral Daily     sodium chloride (PF)  3 mL Intracatheter Q8H     triamcinolone   Topical BID       Intake/Output Summary (Last 24 hours) at 3/6/2021 1157  Last data filed at 3/6/2021 0700  Gross per 24 hour   Intake 1952.5 ml   Output 1325 ml   Net 627.5 ml

## 2021-03-07 ENCOUNTER — APPOINTMENT (OUTPATIENT)
Dept: PHYSICAL THERAPY | Facility: CLINIC | Age: 46
DRG: 872 | End: 2021-03-07
Payer: COMMERCIAL

## 2021-03-07 ENCOUNTER — APPOINTMENT (OUTPATIENT)
Dept: OCCUPATIONAL THERAPY | Facility: CLINIC | Age: 46
DRG: 872 | End: 2021-03-07
Payer: COMMERCIAL

## 2021-03-07 LAB
ALBUMIN SERPL-MCNC: 3.1 G/DL (ref 3.4–5)
ALP SERPL-CCNC: 57 U/L (ref 40–150)
ALT SERPL W P-5'-P-CCNC: 17 U/L (ref 0–50)
ANION GAP SERPL CALCULATED.3IONS-SCNC: 4 MMOL/L (ref 3–14)
AST SERPL W P-5'-P-CCNC: 18 U/L (ref 0–45)
BASE EXCESS BLDV CALC-SCNC: 4.4 MMOL/L
BILIRUB SERPL-MCNC: 1.1 MG/DL (ref 0.2–1.3)
BUN SERPL-MCNC: 12 MG/DL (ref 7–30)
CALCIUM SERPL-MCNC: 9.4 MG/DL (ref 8.5–10.1)
CHLORIDE SERPL-SCNC: 105 MMOL/L (ref 94–109)
CO2 SERPL-SCNC: 30 MMOL/L (ref 20–32)
CREAT SERPL-MCNC: 0.62 MG/DL (ref 0.52–1.04)
ERYTHROCYTE [DISTWIDTH] IN BLOOD BY AUTOMATED COUNT: 15.6 % (ref 10–15)
GFR SERPL CREATININE-BSD FRML MDRD: >90 ML/MIN/{1.73_M2}
GLUCOSE SERPL-MCNC: 99 MG/DL (ref 70–99)
HCO3 BLDV-SCNC: 31 MMOL/L (ref 21–28)
HCT VFR BLD AUTO: 42.8 % (ref 35–47)
HGB BLD-MCNC: 12.5 G/DL (ref 11.7–15.7)
MCH RBC QN AUTO: 25.5 PG (ref 26.5–33)
MCHC RBC AUTO-ENTMCNC: 29.2 G/DL (ref 31.5–36.5)
MCV RBC AUTO: 87 FL (ref 78–100)
O2/TOTAL GAS SETTING VFR VENT: ABNORMAL %
PCO2 BLDV: 54 MM HG (ref 40–50)
PH BLDV: 7.36 PH (ref 7.32–7.43)
PLATELET # BLD AUTO: 203 10E9/L (ref 150–450)
PO2 BLDV: 53 MM HG (ref 25–47)
POTASSIUM SERPL-SCNC: 4.3 MMOL/L (ref 3.4–5.3)
PROT SERPL-MCNC: 7.7 G/DL (ref 6.8–8.8)
RBC # BLD AUTO: 4.91 10E12/L (ref 3.8–5.2)
SODIUM SERPL-SCNC: 139 MMOL/L (ref 133–144)
WBC # BLD AUTO: 7.3 10E9/L (ref 4–11)

## 2021-03-07 PROCEDURE — 80053 COMPREHEN METABOLIC PANEL: CPT | Performed by: FAMILY MEDICINE

## 2021-03-07 PROCEDURE — 97110 THERAPEUTIC EXERCISES: CPT | Mod: GO

## 2021-03-07 PROCEDURE — 85027 COMPLETE CBC AUTOMATED: CPT | Performed by: FAMILY MEDICINE

## 2021-03-07 PROCEDURE — 250N000013 HC RX MED GY IP 250 OP 250 PS 637: Performed by: FAMILY MEDICINE

## 2021-03-07 PROCEDURE — 250N000013 HC RX MED GY IP 250 OP 250 PS 637: Performed by: EMERGENCY MEDICINE

## 2021-03-07 PROCEDURE — 120N000001 HC R&B MED SURG/OB

## 2021-03-07 PROCEDURE — 999N000157 HC STATISTIC RCP TIME EA 10 MIN

## 2021-03-07 PROCEDURE — 250N000013 HC RX MED GY IP 250 OP 250 PS 637: Performed by: INTERNAL MEDICINE

## 2021-03-07 PROCEDURE — 250N000011 HC RX IP 250 OP 636: Performed by: INTERNAL MEDICINE

## 2021-03-07 PROCEDURE — 97530 THERAPEUTIC ACTIVITIES: CPT | Mod: GO

## 2021-03-07 PROCEDURE — 99233 SBSQ HOSP IP/OBS HIGH 50: CPT | Performed by: FAMILY MEDICINE

## 2021-03-07 PROCEDURE — 999N000123 HC STATISTIC OXYGEN O2DAILY TECH TIME

## 2021-03-07 PROCEDURE — 250N000009 HC RX 250: Performed by: FAMILY MEDICINE

## 2021-03-07 PROCEDURE — 94640 AIRWAY INHALATION TREATMENT: CPT | Mod: 76

## 2021-03-07 PROCEDURE — 250N000011 HC RX IP 250 OP 636: Performed by: FAMILY MEDICINE

## 2021-03-07 PROCEDURE — 36415 COLL VENOUS BLD VENIPUNCTURE: CPT | Performed by: FAMILY MEDICINE

## 2021-03-07 PROCEDURE — 999N000097 HC STATISTIC MECHANICAL IN-EXSUFFLATION TREATMENT

## 2021-03-07 PROCEDURE — 999N000111 HC STATISTIC OT IP EVAL DEFER

## 2021-03-07 PROCEDURE — 94640 AIRWAY INHALATION TREATMENT: CPT

## 2021-03-07 PROCEDURE — 97530 THERAPEUTIC ACTIVITIES: CPT | Mod: GP | Performed by: PHYSICAL THERAPIST

## 2021-03-07 PROCEDURE — 82803 BLOOD GASES ANY COMBINATION: CPT | Performed by: FAMILY MEDICINE

## 2021-03-07 PROCEDURE — 97110 THERAPEUTIC EXERCISES: CPT | Mod: GP | Performed by: PHYSICAL THERAPIST

## 2021-03-07 RX ORDER — GABAPENTIN 100 MG/1
200 CAPSULE ORAL 3 TIMES DAILY
Status: DISCONTINUED | OUTPATIENT
Start: 2021-03-07 | End: 2021-03-08 | Stop reason: HOSPADM

## 2021-03-07 RX ORDER — ACETAMINOPHEN 500 MG
1000 TABLET ORAL 3 TIMES DAILY
Status: DISCONTINUED | OUTPATIENT
Start: 2021-03-07 | End: 2021-03-08 | Stop reason: HOSPADM

## 2021-03-07 RX ADMIN — TRIAMCINOLONE ACETONIDE: 5 CREAM TOPICAL at 20:12

## 2021-03-07 RX ADMIN — GABAPENTIN 200 MG: 100 CAPSULE ORAL at 20:10

## 2021-03-07 RX ADMIN — IPRATROPIUM BROMIDE AND ALBUTEROL SULFATE 3 ML: .5; 3 SOLUTION RESPIRATORY (INHALATION) at 19:22

## 2021-03-07 RX ADMIN — ACETAMINOPHEN 650 MG: 325 TABLET, FILM COATED ORAL at 13:46

## 2021-03-07 RX ADMIN — SERTRALINE HYDROCHLORIDE 100 MG: 100 TABLET ORAL at 07:53

## 2021-03-07 RX ADMIN — IPRATROPIUM BROMIDE AND ALBUTEROL SULFATE 3 ML: .5; 3 SOLUTION RESPIRATORY (INHALATION) at 11:36

## 2021-03-07 RX ADMIN — GABAPENTIN 100 MG: 100 CAPSULE ORAL at 13:46

## 2021-03-07 RX ADMIN — ACETAMINOPHEN 1000 MG: 500 TABLET ORAL at 20:10

## 2021-03-07 RX ADMIN — MICONAZOLE NITRATE: 20 POWDER TOPICAL at 07:53

## 2021-03-07 RX ADMIN — METOPROLOL SUCCINATE 25 MG: 25 TABLET, EXTENDED RELEASE ORAL at 07:52

## 2021-03-07 RX ADMIN — CEFTRIAXONE SODIUM 2 G: 2 INJECTION, SOLUTION INTRAVENOUS at 21:56

## 2021-03-07 RX ADMIN — MICONAZOLE NITRATE: 20 POWDER TOPICAL at 20:12

## 2021-03-07 RX ADMIN — ENOXAPARIN SODIUM 40 MG: 100 INJECTION SUBCUTANEOUS at 18:18

## 2021-03-07 RX ADMIN — LISINOPRIL 20 MG: 20 TABLET ORAL at 07:52

## 2021-03-07 RX ADMIN — GABAPENTIN 100 MG: 100 CAPSULE ORAL at 07:51

## 2021-03-07 RX ADMIN — IPRATROPIUM BROMIDE AND ALBUTEROL SULFATE 3 ML: .5; 3 SOLUTION RESPIRATORY (INHALATION) at 07:52

## 2021-03-07 RX ADMIN — IPRATROPIUM BROMIDE AND ALBUTEROL SULFATE 3 ML: .5; 3 SOLUTION RESPIRATORY (INHALATION) at 15:55

## 2021-03-07 RX ADMIN — TRIAMCINOLONE ACETONIDE: 5 CREAM TOPICAL at 07:54

## 2021-03-07 RX ADMIN — ENOXAPARIN SODIUM 40 MG: 100 INJECTION SUBCUTANEOUS at 06:30

## 2021-03-07 ASSESSMENT — ACTIVITIES OF DAILY LIVING (ADL)
ADLS_ACUITY_SCORE: 21

## 2021-03-07 NOTE — PROGRESS NOTES
Date of Admission:  3/4/2021     Assessment & Plan         Bess Enamorado is a 45 year old female admitted on 3/4/2021. She has history of MS, hypertension, obesity and lymphedema. She presents with cellulitis of right leg and acute respiratory failure with hypoxemia.     Sepsis due to gram-negative juan manuel bacteremia, UTI versus cellulitis  Patient meets SIRs criteria with Temp 102.3, HR 120s. WBC normal, blood pressure normal and lactic normal.   --some evidence of cellulitis initially tho on day 2 the erythema seemed equal on both sides so may be all stasis dermatitis   --UTI most likely source with GNR bacteremia.  UA also abnormal with 28 WBC, bacteria, leukocyte esterase, and 22 RBC, though no clear symptoms.   --CT chest negative for infectious process. IVF resuscitation 1.5 L in ED. Early antibiotic with IV vancomycin and IV Zoysn.-->  Changed initially to Ancef, but then with blood cultures positive for GNR change to Rocephin  -Fevers coming down but temp to 100.3 last Saturday evening  -Blood cultures growing Moraxella in both bottles, staph epi and unidentified gram-positive cocci in 1 bottle only.  -Urine culture growing Proteus  -Doubt this is from cellulitis, even though the urine culture does not match up with the blood cultures.  -I believe the staph epi and gram-positive cocci that are both growing in 1 bottle are contaminants and I did not add any significant gram-positive coverage.   -Would consider discussing with ID once we have better identification of the blood culture growth.  I suspect the 2 Ezekiel positives are contaminants     Possible Cellulitis of the right lower extremity   Photographed on presentation to the ED, erythema appears slightly improved on 3/5/21.   - pain: acetaminophen prn, ibuprofen prn, oxycodone prn only for breakthrough  - elevate legs, will trial compression for chronic edema if tolerated  - See photograph taken on presentation in ED providers note   - jiang in place to  promote skin healing, noted blistering/nodules of inner thighs changed to [place] 3/6  -The erythema of her legs actually started looking symmetric by 18 hours after admission, so it seems less likely that this is cellulitis, more likely it is just severe stasis dermatitis in both legs and the pannus.  -Fevers improving on Rocephin in any case.           Acute Respiratory Failure with Hypoxemia, on chronic  Oxygen saturation 76%, started on 4 L of oxygen with no home oxygen requirement.  Increased work of breathing on exam though patient states she is at her baseline.  Lungs sound distant though clear.  VBG show mild chronic respiratory failure with compensated hypercapnia (pH 7.36, CO2 56). VS show tachycardia otherwise stable. Troponin negative. ECG shows sinus tachcycardia with diffuse nonspecific T wave changes, no acute ischemic ST changes. CT PE study shows no signs of acute process including pneumonia or PE. Suspect multifactorial related to untreated sleep apnea, obesity hypoventilation syndrome, as well as narcotics.   Able to titrate oxygen down to 1.5 L this morning with oxygen level of 90-93%.       Generalized Weakness  Increased weakness in the setting of illness. At baseline using wheel chair and walker to get around due to pain and reduced mobility. Patient has been homebound for the past year due to mobility issues, pain and weight gain.  - PT/OT/care transitions consulted  -Her acute weakness is clearly exacerbated by the bacteremia/sepsis.  Will need to pursue PT/OT again as her general status improves with antibiotics  -Still quite weak and unable to get up out of our chair.  This still may be part of her bacteremia/sepsis since she still had a fever in the last 24 hours.       NARCISA  Suspected obesity hypoventilation syndrome  Not compliant with home CPAP. Discussed importance of CPAP with the patient.    - Continue home CPAP if available     Fall  Patient had a fall on 3/5/21 in the hospital  during OT evaluation. Slid down to the ground, landing on her bottom. No significant impact though left leg was somewhat awkwardly positioned. Currently at rest she does not have significant pain. She does have chronic left hip pain with movement, she currently continues to have left hip pain with movement that is unchanged from her baseline though significant. No significant pain in ankle or knee with attempted ROM though exam difficult due to body habitus. As below she has known osteoarthritis and multiple hip fractures per MR in 2019.   - portable XRAY pelvis/left hip      Lymphedema/severe stasis dermatitis  Chronic. Previously has followed with outpatient lymphedema therapy.  - continue compression therapy as tolerated, lymphedema consutled  -Adding triamcinolone cream to areas of redness twice daily     Hypertension  Chronic. Blood pressures reviewed, stable.   - continue home lisinopril and metoprolol with parameters     Multiple sclerosis   On chronic treatment with Ocrelizumab. Has been using wheel chair and walker to get around due to pain and reduced mobility due to MS and OA as below.     Osteoarthritis of left hip  Issues with left hip pain increasing over the past year, greatly affecting mobility. MR has previously shown labrum dengeneration, osteoarthritic changes, and multiple pelvic fractures.  --Her hips are painless when she is laying with the legs in a frog-leg position but even trying to get them back to the midline causes pain.  -I am concerned she may be developing some contractures in the hips because of the position they are always in.  -Will consult Ortho but I doubt that there is any surgical option, and I doubt we could even get her into an MRI to check on degenerative osteoarthritis  -We will try to use Tylenol 1000 mg 3 times daily plus gabapentin up to 200 mg 3 times daily for both her pain and her twitching in her extremities    Depression  - continue home zoloft     Obesity  Body  "mass index is 77.32 kg/m . Contributes to above.     COVID-19 Ruled Out, negative PCR on presentation on 3/4/21. CT without signs of infection. As above is hypoxemic and has had increase in chronic dry cough though low suspicion for infection in patient who is home bound with minimal exposures with no radiographic signs of COVID on CT.  - no precautions     Fall/in hospital  -Slid off off the bed gently to the floor on the first day of hospitalization  -Unable to get up without using the Elidia lift  -Hip and pelvis x-rays suboptimal due to obesity and portable technique but no clear evidence of fracture and no significant increase in pain from her baseline     Diet: Low Saturated Fat Na <2400 mg    DVT Prophylaxis: Enoxaparin (Lovenox) SQ  Brar Catheter: in place, indication: Other (Comment)  Code Status: Full Code            Disposition Plan  Will need TCU for underlying weakness  -At least 1 more day of IV antibiotics for the Moraxella growing in her blood, then could switch to a fluoroquinolone  -May wish to consider discussing with ID once the other 2 organisms are identified in her single blood culture.  At this point I believe these are contaminants      SUBJECTIVE:  Comfortable as long as she is laying in a frog-leg position  Could not get up from a chair without assist of 2  Still low-grade fever up to 100.3 last evening  No malaise or nausea, just the weakness     ROS:4 point ROS including Respiratory, CV, GI and , other than that noted in the HPI,  is negative     OBJECTIVE:   BP (!) 160/94 (BP Location: Right arm)   Pulse 102   Temp 98  F (36.7  C) (Oral)   Resp 20   Ht 1.6 m (5' 3\")   Wt (!) 198 kg (436 lb 8.2 oz)   SpO2 94%   BMI 77.32 kg/m      GENERAL APPEARANCE: Awake, alert, no apparent distress     RESP: Clear     CV: regular rate and rhythm, no murmur , edema: Massive 4+ indurated with marked erythema     Abdomen: soft, nontender, no liver or spleen enlargement, no masses, BSs normal "   Skin: no cyanosis, pallor, or jaundice  The erythema in her legs is now more symmetric and I believe this is just stasis dermatitis, not cellulitis    CMP  Recent Labs   Lab 03/07/21 0600 03/06/21 0611 03/04/21 2127    134 138   POTASSIUM 4.3 4.1 4.1   CHLORIDE 105 103 104   CO2 30 28 28   ANIONGAP 4 3 6   GLC 99 93 110*   BUN 12 13 15   CR 0.62 0.67 0.69   GFRESTIMATED >90 >90 >90   GFRESTBLACK >90 >90 >90   EVITA 9.4 8.9 9.5   PROTTOTAL 7.7  --  8.5   ALBUMIN 3.1*  --  3.6   BILITOTAL 1.1  --  1.7*   ALKPHOS 57  --  67   AST 18  --  17   ALT 17  --  20     CBC  Recent Labs   Lab 03/07/21 0600 03/06/21 0611 03/04/21 2127   WBC 7.3 7.3 10.9   RBC 4.91 4.69 5.48*   HGB 12.5 12.0 14.3   HCT 42.8 39.6 47.4*   MCV 87 84 87   MCH 25.5* 25.6* 26.1*   MCHC 29.2* 30.3* 30.2*   RDW 15.6* 16.0* 15.5*    198 284     INRNo lab results found in last 7 days.  Arterial BloodGas  Recent Labs   Lab 03/07/21 0600 03/04/21 2127   O2PER 28% 32%      Venous Blood Gas  Recent Labs   Lab 03/07/21 0600 03/04/21 2127   PHV 7.36 7.36   PCO2V 54* 56*   PO2V 53* 18*   HCO3V 31* 32*   LETTY 4.4 4.3   O2PER 28% 32%       Medications     cefTRIAXone  2 g Intravenous Q24H     enoxaparin ANTICOAGULANT  40 mg Subcutaneous Q12H     gabapentin  100 mg Oral TID     influenza quadrivalent (PF) vacc  0.5 mL Intramuscular Prior to discharge     ipratropium - albuterol 0.5 mg/2.5 mg/3 mL  3 mL Nebulization 4x daily     lisinopril  20 mg Oral Daily     metoprolol succinate ER  25 mg Oral Daily     miconazole   Topical BID     sertraline  100 mg Oral Daily     sodium chloride (PF)  3 mL Intracatheter Q8H     triamcinolone   Topical BID       Intake/Output Summary (Last 24 hours) at 3/7/2021 1342  Last data filed at 3/7/2021 1239  Gross per 24 hour   Intake 360 ml   Output 5000 ml   Net -4640 ml

## 2021-03-07 NOTE — PLAN OF CARE
Patient turned and repositioned q2h, complaining of left hip pain. Pure wick leaked, requiring linen change. Received scheduled rocephin antibiotic, without any complications.

## 2021-03-07 NOTE — PLAN OF CARE
Patient up to the chair with lift after multiple try's patient was unable to stand patient lifted back to bed per patient request

## 2021-03-07 NOTE — PROGRESS NOTES
Care Management Follow Up    Length of Stay (days): 3    Expected Discharge Date: 03/07/21     Concerns to be Addressed: discharge planning     Patient plan of care discussed at interdisciplinary rounds: Yes    Anticipated Discharge Disposition: Skilled Nursing Facilty, Transitional Care     Anticipated Discharge Services: None  Anticipated Discharge DME: Oxygen    Patient/family educated on Medicare website which has current facility and service quality ratings: yes  Education Provided on the Discharge Plan:  Yes  Patient/Family in Agreement with the Plan: yes    Referrals Placed by CM/SW: Post Acute Facilities  Private pay costs discussed: Not applicable    Additional Information:    Met with the Pt regarding discharge planning.  Confirmed the Pt is in agreement with transitioning to [facility] upon discharge.  [phone] regarding the discharge plan.    Per the discussion in Interdisciplinary Rounds, the Pt will discharge 3/9 or 3/10.  She will probably discharge on PO antibiotics per MD.  Care Management will arrange transportation upon discharge.    Plan:  [facility]      Ivania Liriano RN

## 2021-03-08 VITALS
DIASTOLIC BLOOD PRESSURE: 74 MMHG | HEART RATE: 101 BPM | RESPIRATION RATE: 18 BRPM | BODY MASS INDEX: 51.91 KG/M2 | HEIGHT: 63 IN | WEIGHT: 293 LBS | SYSTOLIC BLOOD PRESSURE: 142 MMHG | OXYGEN SATURATION: 92 % | TEMPERATURE: 96.8 F

## 2021-03-08 LAB
BACTERIA SPEC CULT: ABNORMAL
BACTERIA SPEC CULT: ABNORMAL
Lab: ABNORMAL
PLATELET # BLD AUTO: 223 10E9/L (ref 150–450)
SPECIMEN SOURCE: ABNORMAL

## 2021-03-08 PROCEDURE — 250N000013 HC RX MED GY IP 250 OP 250 PS 637: Performed by: INTERNAL MEDICINE

## 2021-03-08 PROCEDURE — G0463 HOSPITAL OUTPT CLINIC VISIT: HCPCS

## 2021-03-08 PROCEDURE — 90686 IIV4 VACC NO PRSV 0.5 ML IM: CPT | Performed by: FAMILY MEDICINE

## 2021-03-08 PROCEDURE — 36415 COLL VENOUS BLD VENIPUNCTURE: CPT | Performed by: PHYSICIAN ASSISTANT

## 2021-03-08 PROCEDURE — 250N000013 HC RX MED GY IP 250 OP 250 PS 637: Performed by: FAMILY MEDICINE

## 2021-03-08 PROCEDURE — 250N000009 HC RX 250: Performed by: FAMILY MEDICINE

## 2021-03-08 PROCEDURE — G0008 ADMIN INFLUENZA VIRUS VAC: HCPCS | Performed by: FAMILY MEDICINE

## 2021-03-08 PROCEDURE — 85049 AUTOMATED PLATELET COUNT: CPT | Performed by: PHYSICIAN ASSISTANT

## 2021-03-08 PROCEDURE — 99239 HOSP IP/OBS DSCHRG MGMT >30: CPT | Performed by: FAMILY MEDICINE

## 2021-03-08 PROCEDURE — 250N000011 HC RX IP 250 OP 636: Performed by: FAMILY MEDICINE

## 2021-03-08 PROCEDURE — 250N000013 HC RX MED GY IP 250 OP 250 PS 637: Performed by: PHYSICIAN ASSISTANT

## 2021-03-08 PROCEDURE — 94640 AIRWAY INHALATION TREATMENT: CPT | Mod: 76

## 2021-03-08 PROCEDURE — 94640 AIRWAY INHALATION TREATMENT: CPT

## 2021-03-08 RX ORDER — IBUPROFEN 400 MG/1
400 TABLET, FILM COATED ORAL EVERY 6 HOURS PRN
DISCHARGE
Start: 2021-03-08 | End: 2022-02-08

## 2021-03-08 RX ORDER — IPRATROPIUM BROMIDE AND ALBUTEROL SULFATE 2.5; .5 MG/3ML; MG/3ML
3 SOLUTION RESPIRATORY (INHALATION) EVERY 6 HOURS PRN
DISCHARGE
Start: 2021-03-08 | End: 2021-04-15

## 2021-03-08 RX ORDER — LEVOFLOXACIN 750 MG/1
750 TABLET, FILM COATED ORAL DAILY
DISCHARGE
Start: 2021-03-08 | End: 2021-03-15

## 2021-03-08 RX ORDER — GABAPENTIN 100 MG/1
200 CAPSULE ORAL 3 TIMES DAILY
DISCHARGE
Start: 2021-03-08 | End: 2021-04-15

## 2021-03-08 RX ADMIN — MICONAZOLE NITRATE: 20 POWDER TOPICAL at 08:47

## 2021-03-08 RX ADMIN — ENOXAPARIN SODIUM 40 MG: 100 INJECTION SUBCUTANEOUS at 06:48

## 2021-03-08 RX ADMIN — METOPROLOL SUCCINATE 25 MG: 25 TABLET, EXTENDED RELEASE ORAL at 08:45

## 2021-03-08 RX ADMIN — INFLUENZA A VIRUS A/GUANGDONG-MAONAN/SWL1536/2019 CNIC-1909 (H1N1) ANTIGEN (FORMALDEHYDE INACTIVATED), INFLUENZA A VIRUS A/HONG KONG/2671/2019 (H3N2) ANTIGEN (FORMALDEHYDE INACTIVATED), INFLUENZA B VIRUS B/PHUKET/3073/2013 ANTIGEN (FORMALDEHYDE INACTIVATED), AND INFLUENZA B VIRUS B/WASHINGTON/02/2019 ANTIGEN (FORMALDEHYDE INACTIVATED) 0.5 ML: 15; 15; 15; 15 INJECTION, SUSPENSION INTRAMUSCULAR at 12:12

## 2021-03-08 RX ADMIN — IPRATROPIUM BROMIDE AND ALBUTEROL SULFATE 3 ML: .5; 3 SOLUTION RESPIRATORY (INHALATION) at 07:28

## 2021-03-08 RX ADMIN — GABAPENTIN 200 MG: 100 CAPSULE ORAL at 08:46

## 2021-03-08 RX ADMIN — SERTRALINE HYDROCHLORIDE 100 MG: 100 TABLET ORAL at 08:46

## 2021-03-08 RX ADMIN — ACETAMINOPHEN 1000 MG: 500 TABLET ORAL at 08:45

## 2021-03-08 RX ADMIN — GABAPENTIN 200 MG: 100 CAPSULE ORAL at 14:32

## 2021-03-08 RX ADMIN — ACETAMINOPHEN 1000 MG: 500 TABLET ORAL at 14:32

## 2021-03-08 RX ADMIN — LISINOPRIL 20 MG: 20 TABLET ORAL at 08:46

## 2021-03-08 RX ADMIN — IPRATROPIUM BROMIDE AND ALBUTEROL SULFATE 3 ML: .5; 3 SOLUTION RESPIRATORY (INHALATION) at 11:03

## 2021-03-08 RX ADMIN — Medication 2.5 MG: at 06:51

## 2021-03-08 RX ADMIN — TRIAMCINOLONE ACETONIDE: 5 CREAM TOPICAL at 08:47

## 2021-03-08 ASSESSMENT — ACTIVITIES OF DAILY LIVING (ADL)
ADLS_ACUITY_SCORE: 25
ADLS_ACUITY_SCORE: 21
ADLS_ACUITY_SCORE: 25
ADLS_ACUITY_SCORE: 25

## 2021-03-08 NOTE — PROGRESS NOTES
[facility] DISCHARGE NOTE    Patient discharged to transitional care unit at 3:46 PM via cart. Accompanied by staff. Discharge instructions reviewed with caregiver, opportunity offered to ask questions. Prescriptions sent to patients preferred pharmacy. All belongings sent with patient.    Sandra Luna RN

## 2021-03-08 NOTE — PROGRESS NOTES
Care Management Discharge Note    Discharge Date: 03/08/21       Discharge Disposition: Skilled Nursing Facilty, Transitional Care    Discharge Services: None    Discharge DME: Oxygen    Discharge Transportation: agency    Private pay costs discussed: transportation costs    PAS Confirmation Code: [id number]  Patient/family educated on Medicare website which has current facility and service quality ratings: yes    Education Provided on the Discharge Plan:  Yes  Persons Notified of Discharge Plans: Patient, sister Anali and Mother  Patient/Family in Agreement with the Plan: yes    Handoff Referral Completed: Yes    Additional Information:    Plan:  [facility].  Transportation provided by [facility] with oxygen.      Ivania Liriano RN

## 2021-03-08 NOTE — PLAN OF CARE
"Patient is alert and orientated. Denies pain besides with repositioning. Continues on 2.5L NC and has been saturating in the low 90s. Encouraged patient to continue to utilize her incentive spirometer. Repositioning done Q2hrs and purewick in place.     BP (!) 159/98   Pulse 108   Temp 98  F (36.7  C) (Oral)   Resp 18   Ht 1.6 m (5' 3\")   Wt (!) 198 kg (436 lb 8.2 oz)   SpO2 92%   BMI 77.32 kg/m      "

## 2021-03-08 NOTE — PLAN OF CARE
Occupational Therapy Discharge Summary    Reason for therapy discharge:    Discharged to transitional care facility.    Progress towards therapy goal(s). See goals on Care Plan in [facility] electronic health record for goal details.  Goals partially met.  Barriers to achieving goals:   discharge from facility.    Therapy recommendation(s):    Continued therapy is recommended.  Rationale/Recommendations:  TCU to increase independence with ADLs and related transfers.

## 2021-03-08 NOTE — PLAN OF CARE
Physical Therapy Discharge Summary    Reason for therapy discharge:    Discharged to transitional care facility.    Progress towards therapy goal(s). See goals on Care Plan in [facility] electronic health record for goal details.  Goals partially met.  Barriers to achieving goals:   discharge from facility.    Therapy recommendation(s):    Continued therapy is recommended.  Rationale/Recommendations:  TCU to help patient progress ambulation, transfers, bed mobility.

## 2021-03-08 NOTE — PLAN OF CARE
Problem: Adult Inpatient Plan of Care  Goal: Absence of Hospital-Acquired Illness or Injury  Outcome: No Change  Intervention: Identify and Manage Fall Risk  Recent Flowsheet Documentation  Taken 3/8/2021 0100 by Erum Cabrera RN  Safety Promotion/Fall Prevention:   activity supervised   fall prevention program maintained   patient and family education   nonskid shoes/slippers when out of bed    Uses call light appropriately. Remained on bedrest overnight. No attempts to get out of bed.  Intervention: Prevent Skin Injury  Recent Flowsheet Documentation  Taken 3/8/2021 0100 by Erum Cabrera RN  Body Position:   turned   left   legs elevated    Repositioned Q2-3 hours. Purewic in place to keep alexander area clean/ dry. Lift used for repos.   Intervention: Prevent and Manage VTE (Venous Thromboembolism) Risk  Recent Flowsheet Documentation  Taken 3/8/2021 0100 by Erum Cabrera RN  VTE Prevention/Management:   anticoagulant therapy maintained   fluids promoted    On lovenox. Unable to use PCDs due to ulcerations/ rash to BLEs.     Problem: Adult Inpatient Plan of Care  Goal: Absence of Hospital-Acquired Illness or Injury  Intervention: Prevent Skin Injury  Recent Flowsheet Documentation  Taken 3/8/2021 0100 by Erum Cabrera RN  Body Position:   turned   left   legs elevated    Patient has IS at bedside and asked to cough/ turn/ deep breathe.      Problem: Adult Inpatient Plan of Care  Goal: Optimal Comfort and Wellbeing  Outcome: No Change

## 2021-03-08 NOTE — DISCHARGE SUMMARY
[facility] Discharge Summary    Bess Enamorado MRN# [id number]   Age: 45 year old YOB: 1975     Date of Admission:  3/4/2021  Date of Discharge::  3/8/2021  Admitting Physician:  Parvin Tate MD  Discharge Physician:  Caleb Green MD, MD             Admission Diagnoses:   Tachycardia [R00.0]  SIRS (systemic inflammatory response syndrome) (H) [R65.10]  Cellulitis of right leg [L03.115]          Principle Discharge Diagnosis:       Sepsis due to gram-negative juan manuel bacteremia (bloodstream infection) in turn due to UTI versus cellulitis, both of which I think were present on admission     See hospital course for further active diagnoses addressed during this admission.            Procedures:   No procedures performed during this admission          Medications Prior to Admission:     Medications Prior to Admission   Medication Sig Dispense Refill Last Dose     acetaminophen (TYLENOL) 500 MG tablet Take 500-1,000 mg by mouth once as needed for mild pain   3/4/2021 at 1800     Cholecalciferol (VITAMIN D3 PO) Take 10,000 Units by mouth daily    3/4/2021 at Unknown time     desogestrel-ethinyl estradiol (APRI) 0.15-30 MG-MCG tablet TAKE 1 TABLET DAILY CONTINUOUSLY-OMIT PLACEBPO TABS UNTIL AFTER 3 MONTHS OF HORMONE TABS 112 tablet 3 Past Week at Unknown time     lisinopril (PRINIVIL/ZESTRIL) 20 MG tablet Take 1 tablet (20 mg) by mouth daily 90 tablet 3 3/4/2021 at 1500     metoprolol succinate ER (TOPROL-XL) 25 MG 24 hr tablet Take 1 tablet (25 mg) by mouth daily 90 tablet 1 3/4/2021 at 1500     sertraline (ZOLOFT) 100 MG tablet Take 1 tablet (100 mg) by mouth daily 90 tablet 3 3/4/2021 at Unknown time     order for DME Equipment ordered: RESMED CPAP Mask type: Nasal Settings: 10 CM H2O                Discharge Medications:     Current Discharge Medication List      START taking these medications    Details   gabapentin (NEURONTIN) 100 MG capsule Take 2 capsules (200 mg) by mouth 3 times daily  Qty:       Associated Diagnoses: Multiple sclerosis (H)      ibuprofen (ADVIL/MOTRIN) 400 MG tablet Take 1 tablet (400 mg) by mouth every 6 hours as needed for moderate pain  Qty:      Associated Diagnoses: Multiple sclerosis (H)      ipratropium - albuterol 0.5 mg/2.5 mg/3 mL (DUONEB) 0.5-2.5 (3) MG/3ML neb solution Take 1 vial (3 mLs) by nebulization every 6 hours as needed for shortness of breath / dyspnea or wheezing    Associated Diagnoses: Chronic respiratory failure with hypoxia (H); Obesity hypoventilation syndrome (H)      levofloxacin (LEVAQUIN) 750 MG tablet Take 1 tablet (750 mg) by mouth daily for 7 days    Associated Diagnoses: Cellulitis of right lower extremity      miconazole (MICATIN) 2 % external powder Apply topically 2 times daily  Qty:      Associated Diagnoses: Morbid obesity (H)         CONTINUE these medications which have NOT CHANGED    Details   acetaminophen (TYLENOL) 500 MG tablet Take 500-1,000 mg by mouth once as needed for mild pain      Cholecalciferol (VITAMIN D3 PO) Take 10,000 Units by mouth daily       desogestrel-ethinyl estradiol (APRI) 0.15-30 MG-MCG tablet TAKE 1 TABLET DAILY CONTINUOUSLY-OMIT PLACEBPO TABS UNTIL AFTER 3 MONTHS OF HORMONE TABS  Qty: 112 tablet, Refills: 3    Associated Diagnoses: Polycystic ovaries; Encounter for surveillance of contraceptive pills      lisinopril (PRINIVIL/ZESTRIL) 20 MG tablet Take 1 tablet (20 mg) by mouth daily  Qty: 90 tablet, Refills: 3    Associated Diagnoses: Benign essential hypertension      metoprolol succinate ER (TOPROL-XL) 25 MG 24 hr tablet Take 1 tablet (25 mg) by mouth daily  Qty: 90 tablet, Refills: 1    Associated Diagnoses: Benign essential hypertension; Sinus tachycardia      sertraline (ZOLOFT) 100 MG tablet Take 1 tablet (100 mg) by mouth daily  Qty: 90 tablet, Refills: 3    Associated Diagnoses: Moderate episode of recurrent major depressive disorder (H); Generalized anxiety disorder      order for DME Equipment ordered:  "RESMED CPAP Mask type: Nasal Settings: 10 CM H2O                        Brief History of Illness:     From Admission H+P:   Bess Enamorado is a 45 year old female with PMHx of multiple sclerosis on chronic treatment with Ocrelizumab, morbid obesity, severe lower extremity lymphedema and HTN presents with complaint of a fever and worsening right leg/groin redness.  Per the patient's report, the patient started to experience subjective fever and chills last night.  The patient states that she develop a low grade fever of 100.1F at home.  The patient states that due to her morbidly obese body habitus, it is difficult for her to see her legs but thinks that she might have increase redness there.  The patient does have some mild increasing pain in her right leg.  The patient also has a mild dry cough but denies any nausea, vomiting, diarrhea, abdominal pain or dysuria.  The patient also denies any chest pain or shortness of breath.     In our ER, the patient has sign of cellulitis in her right leg/groin area per our ER physician.  The patient was hemodynamically stable and was afebrile.  The patient however did require 3-4L of O2.  CTA of the chest was negative for PNA/PE.  The patient COVID/Influenza testing were negative.  The patient was given Zosyn/Vancomycin for her cellulitis.               TODAY:     Subjective:  Improving.  Redness in legs notably improved from admission.  No fever or chills. No dyspnea, feels like her breathing is at baseline although still needing 2LNC.  No pain.  No other concerns.   No other pain.         ROS:   ROS: 10 point ROS neg other than the symptoms noted above in the HPI.     BP (!) 142/74 (BP Location: Right arm)   Pulse 101   Temp 96.8  F (36  C) (Oral)   Resp 18   Ht 1.6 m (5' 3\")   Wt (!) 198 kg (436 lb 8.2 oz)   SpO2 92%   BMI 77.32 kg/m     EXAM:  General: awake and alert, NAD, oriented x 3  Head: normocephalic  Neck: unremarkable, no lymphadenopathy   HEENT: oropharynx " pink and moist    Heart: Regular rate and rhythm, no murmurs, rubs, or gallops  Lungs: clear to auscultation bilaterally with good air movement throughout  Abdomen: soft, non-tender, no masses or organomegaly  Extremities: chronic 2+ lymphedema in lower extremities but erythema much improved from admission  Skin otherwise unremarkable.             Hospital Course:        Bess Enamorado is a 45 year old female admitted on 3/4/2021. She has history of MS, hypertension, obesity and lymphedema. She presents with cellulitis of right leg and acute respiratory failure with hypoxemia.     Sepsis due to gram-negative juan manuel bacteremia (bloodstream infection) in turn due to UTI versus cellulitis, both of which I think were present on admission   Patient met SIRs criteria with Temp 102.3, HR 120s. WBC normal, blood pressure normal and lactic normal.   --some evidence of cellulitis initially tho on day 2 the erythema seemed equal on both sides so may be all stasis dermatitis.  However, the erythema did improve notably during the admission so suspect it was a super-imposed cellulitis on top of some underlying stasis dermatitis.    --UTI most likely source with GNR bacteremia.  UA also abnormal with 28 WBC, bacteria, leukocyte esterase, and 22 RBC, though no clear symptoms.   --CT chest negative for infectious process. IVF resuscitation 1.5 L in ED. Was started on  IV vancomycin and IV Zoysn.  urine culture grew proteus with pan-susceptibility other than nitrofurantoin.  Blood cultures grew moraxella in 2 of 2 (likely true infection) which is susceptible to rocephin and levaquin as well as staph epidermidis and another not yet confirmed strain of gram positive cocci in pairs which I think is likely to also be a contaminant and/or to be susceptible to levaquin.  With clinical improvement and overall picture, I think it is reasonable to transition to oral levaquin at this point and we'll continue 7 more days of oral levaquin on  discharge.       Suspected Cellulitis of the right lower extremity with underlying bilateral stasis dermatitis   Photographed on presentation to the ED, erythema improved significantly during admission as above.     - See photograph taken on presentation in ED providers note. Treating as above.    - jiang in place to promote skin healing, noted blistering/nodules of inner thighs changed to [place] 3/6.  Continue with skin cares as able at TCU, but I don't think a jiang would be appropriate at this point.        Respiratory Failure with Hypoxemia - initially thought acute, but after negative evaluation I suspect this is actually chronic hypoxemic respiratory failure due to obesity hypoventilation and NARCISA    Oxygen saturation 76% on admission, started on 4 L of oxygen with no home oxygen requirement.  Increased work of breathing on exam though patient states she is at her baseline.  Lungs sound distant though clear.  VBG showed mild chronic respiratory failure with compensated hypercapnia (pH 7.36, CO2 56). VS show tachycardia otherwise stable. Troponin negative. ECG shows sinus tachcycardia with diffuse nonspecific T wave changes, no acute ischemic ST changes. CT PE study shows no signs of acute process including pneumonia or PE. Suspect multifactorial related to untreated sleep apnea, obesity hypoventilation syndrome, as well as narcotics.   Able to titrate oxygen down to 2L but not further.  Patient feels like her breathing is at her long-standing baseline, which I suspect may be true.  Will continue on 2LNC on discharge, plan to wean down as able at TCU with hope that this will improve with improved mobility and/or in the long-term with weight loss.       Generalized Weakness  Increased weakness in the setting of illness. At baseline using wheel chair and walker to get around due to pain and reduced mobility. Patient has been homebound for the past year due to mobility issues, pain and weight gain.  - PT/OT/care  transitions consulted  -Her acute weakness is clearly exacerbated by the bacteremia/sepsis.  Will need to pursue PT/OT again as her general status improves with antibiotic treatment as above.       NARCISA  Suspected obesity hypoventilation syndrome  Not compliant with home CPAP. Discussed importance of CPAP with the patient.    - Continue home CPAP on discharge       Lymphedema/severe stasis dermatitis  Chronic. Previously has followed with outpatient lymphedema therapy.  - continue compression therapy as tolerated, lymphedema consutled  -Adding triamcinolone cream to areas of redness twice daily     Hypertension  Chronic. Blood pressures reviewed, stable.   - continue home lisinopril and metoprolol with parameters     Multiple sclerosis   On chronic treatment with Ocrelizumab. Has been using wheel chair and walker to get around due to pain and reduced mobility due to MS and OA as below.     Osteoarthritis of left hip  Issues with left hip pain increasing over the past year, greatly affecting mobility. MR has previously shown labrum dengeneration, osteoarthritic changes, and multiple pelvic fractures.      Depression  - continue home zoloft     Obesity  Body mass index is 77.32 kg/m . Contributes to above.     COVID-19 Ruled Out, negative PCR on presentation on 3/4/21. CT without signs of infection. As above is hypoxemic and has had increase in chronic dry cough though low suspicion for infection in patient who is home bound with minimal exposures with no radiographic signs of COVID on CT.  - no precautions     Fall - in hospital  -Slid off off the bed gently to the floor on the first day of hospitalization  -Unable to get up without using the Elidia lift  -Hip and pelvis x-rays suboptimal due to obesity and portable technique but no clear evidence of fracture and no significant increase in pain from her baseline, continue physical therapy on discharge, consider further imaging if any evidence of pain beyond baseline  but mechanism very low risk and does not appear to have any pain beyond baseline at discharge.       Diet: Low Saturated Fat Na <2400 mg      DVT Prophylaxis: Enoxaparin (Lovenox) subcutaneous    Brar Catheter: in place, indication: Other (Comment)    Code Status: Full Code                Discharge Instructions and Follow-Up:     Discharge diet: Orders Placed This Encounter      Low Saturated Fat Na <2400 mg       Discharge activity: Activity as tolerated   Discharge follow-up: Follow-up with provider at TCU within 1 week, reassess oxygen needs and follow-up on final culture results at that time.             Discharge Disposition:     Discharged to TCU      Attestation:  I have reviewed today's vital signs, notes, medications, labs and imaging.  Amount of time performed on this discharge summary: 70 minutes.    Caleb Green MD, MD

## 2021-03-08 NOTE — PROGRESS NOTES
Patient rested well overnight. Repositioned using lift during the night. Purewic keeping patient dry, although chucs change and alexander care provided once overnight. She remains on 2L with sats 92%. BP elevated. Concern for no BM charted since admission, but will confirm with patient.

## 2021-03-08 NOTE — DISCHARGE INSTRUCTIONS
Per wound ostomy nurse evaluation 3/8/21   Right lateral ankle partial thickness wound -   1.) Cleanse with saline or wound cleanser, dry  2.) Cover with Mepilex border lite 3x3 or alternate foam bandage as available  Change other day and as needed for drainage  Recommend lymphedema therapy to evaluate and treat at TCU if available, if not available recommend pt follow-up on outpatient basis when able and apply pt's compression wraps every morning, (remove at night).      Recommend use of urea containing cream daily to bilateral lower legs and feet scaling areas, (may obtain from pharmacy though does not require a prescription, Eucerin makes several with urea as ingredient). May use Aquaphor, though not as effective on scaling.    For skin folds  - where there is more significant skin breakdown/fissuring such as right abd wash twice daily and dry well.  Apply Triad paste to fissured area left abd fold followed by light dusting of miconazole powder.      For skin folds that do not have significant fissures, may try Interdry Ag material as below with daily cares.  Alternatively may use light dusting of Miconazole powder if Interdry Ag is not available or not staying in place though powder will require twice daily application (cleanse prior to each application).     1. Wash skin gently. Pat dry, do not rub.  2. Cut the appropriate size of fabric with clean scissors, allowing a minimum of 2 inches of the fabric exposed outside the skin fold for moisture evaporation.  3. Lay a single layer of fabric in the skin fold, placing one edge of the fabric into the base of the fold. Gently smooth the rest of the fabric over the skin, keeping it flat. Leave at least 2 inches of the fabric exposed outside the skin fold.  4. Secure the fabric in one of several ways; with the skin fold, with a small amount of tape, or tucked under clothing.  When to Dispose: Each piece of InterDry may be used up to 5 days, depending on fabric  soiling, odor, amount of moisture and general skin condition. May label with skin marker to date    Removal: Remove the fabric before bathing and reuse when finished. When removing the fabric from skin folds, gently separate the skin fold and lift away fabric.      For skin folds impacted in incontinence, use barrier cream twice daily and PRN.

## 2021-03-09 ENCOUNTER — PATIENT OUTREACH (OUTPATIENT)
Dept: NURSING | Facility: CLINIC | Age: 46
End: 2021-03-09
Payer: COMMERCIAL

## 2021-03-09 DIAGNOSIS — Z71.89 OTHER SPECIFIED COUNSELING: ICD-10-CM

## 2021-03-09 ASSESSMENT — ACTIVITIES OF DAILY LIVING (ADL): DEPENDENT_IADLS:: CLEANING;SHOPPING;TRANSPORTATION

## 2021-03-09 NOTE — PROGRESS NOTES
Clinic Care Coordination Contact  Care Coordination Transition Communication    Referral Source: IP Handoff    Clinical Data: Patient was hospitalized at [facility] from 3/4/21 to 3/8/21 with diagnosis of sepsis due to cellulitis of the right leg.     Transition to Facility:              Facility Name: [facility]              Contact name and phone number/fax: Florina -  in TCU at [phone]    Plan: RN/SW Care Coordinator will await notification from facility staff informing RN/SW Care Coordinator of patient's discharge plans/needs. RN/SW Care Coordinator will review chart and outreach to facility staff every 4 weeks and as needed.     Eduarda Anderson RN, [facility] RN Coordinator  [facility]   3/9/2021    10:07 AM  [phone]

## 2021-03-09 NOTE — PROGRESS NOTES
[facility] Nurse Inpatient Wound Assessment   Reason for consultation: Evaluate and treat  Left ankle wounds    Assessment  Left wounds due to Trauma and lymphedema   Bilateral knee and left abd fold intertrigo with fissure right abd  Status: initial assessment    Treatment Plan  Left ankle wounds: Continue foam bandage, changed every 2-3 days.  Recommending follow-up with lymphedema therapy as outpatient or at TCU if available.  For now use her compression wraps  For intertrigo, recommend interdry Ag daily where not fissured and bleeding. Triad paste and miconazole powder where fissured and bleeding, BID.    Recommended urea cream for dry skin of lower legs      Recommendations written on discharge form, pt going to a TCU    Patient History  According to provider note(s):  Pt hit left ankle on wheelchair, has been slow to heal.  Multiple co-morbidities including MS, obesity, lymphedema.    Objective Data  Containment of urine/stool: Has been using Purewick but this is not available at TCU, plan for temporary catheter until she has improved mobility    Active Diet Order  Orders Placed This Encounter      Advance Diet as Tolerated      Output:   I/O last 3 completed shifts:  In: 630 [P.O.:620; I.V.:10]  Out: 7550 [Urine:7550]    Risk Assessment:   Sensory Perception: 2-->very limited  Moisture: 3-->occasionally moist  Activity: 1-->bedfast  Mobility: 2-->very limited  Nutrition: 3-->adequate  Friction and Shear: 1-->problem  Ben Score: 12                          Labs:   Recent Labs   Lab 03/07/21  0600 03/06/21  0611   ALBUMIN 3.1*  --    HGB 12.5 12.0   WBC 7.3 7.3   CRP  --  144.0*       Physical Exam  Areas of skin assessed: focused abd, knees, legs left ankle    Wound Location:  Left ankle  Superficial pink open area over lateral malleolus is about 2.5x2.5     Palpation of the wound bed: normal      Drainage: small     Description of drainage: serous  Periwound skin: intact      Color: normal and consistent with  surrounding tissue      Temperature: normal   Odor: none  Pain: absent     Some thick dry skin on legs.  Much moisture of skin folds of knees  Right abd fold with superficial bleeding fissure, about 5-6cm across. No signs of infection.    Interventions  Visual inspection and assessment completed   Wound Care Rationale Improve absorptive capacity and Provide protection   Wound Care: done per plan of care  Supplies: Interdry Ag remainder sent with pt to TCU    Current support surface: Bariatric Low air loss mattress  Education provided to: plan of care  Discussed plan of care with Patient and Nurse    Sirena Wilcox RN, CWOCN

## 2021-03-11 LAB
BACTERIA SPEC CULT: ABNORMAL
SPECIMEN SOURCE: ABNORMAL
SPECIMEN SOURCE: ABNORMAL

## 2021-03-17 ENCOUNTER — VIRTUAL VISIT (OUTPATIENT)
Dept: PSYCHOLOGY | Facility: CLINIC | Age: 46
End: 2021-03-17
Payer: COMMERCIAL

## 2021-03-17 DIAGNOSIS — F33.1 MAJOR DEPRESSIVE DISORDER, RECURRENT EPISODE, MODERATE (H): ICD-10-CM

## 2021-03-17 DIAGNOSIS — F41.1 GENERALIZED ANXIETY DISORDER: Primary | ICD-10-CM

## 2021-03-17 PROCEDURE — 90834 PSYTX W PT 45 MINUTES: CPT | Mod: GT | Performed by: SOCIAL WORKER

## 2021-03-18 NOTE — PROGRESS NOTES
Progress Note    Patient Name: Bess Enamorado  Date: 3/17/2021       Service Type: Individual      Session Start Time: 1:30 PM  Session End Time: 2:20 PM     Session Length: 50 Minutes    Session #: 30    Attendees: Client      Mode of Communication:  Video Conference via [employer]    Telemedicine Visit: The client's condition can be safely assessed and treated via synchronous audio and visual telemedicine encounter.      Reason for Telemedicine Visit: The client was only offered a telehealth visit.    Originating Site (Client Location): Rehabilitation Center in [place], [place]    Distant Site (Provider Location): Provider Remote Setting    Consent:  The client has verbally consented to the potential risks and benefits of telemedicine (video visit) versus in person care. She agreed that her insurance would be billed. She also agreed to make self-payment for services provided, if needed, and she agreed to take responsibility for payment of non-covered services.      Treatment Plan Last Reviewed: 2/24/2021  PHQ-9 / TALIB-7 : N/A    DATA  Interactive Complexity: No  Crisis: No       Progress Since Last Session (Related to Symptoms / Goals / Homework):   Symptoms: Worsening : The client reported experiencing increased symptoms of anxiety and depression since her recent hospitalization.    Homework: Did not complete. The client reported that she has not continued examining the origins of the beliefs about herself.       Episode of Care Goals: No improvement - ACTION (Actively working towards change); Intervened by reinforcing change plan / affirming steps taken.     Current / Ongoing Stressors and Concerns:   The client reported that her primary stress is managing the physical health issues she has been experiencing.     Treatment Objective(s) Addressed in This Session:   The client will learn and apply emotion regulation skills.       Intervention:   Talked about the client's  recent hospitalization, her stay in the rehabilitation center and her ongoing physical health issues. Discussed and reinforced the client's efforts to allow herself to experience and express her emotions. Obtained a commitment from the client to continue to allow herself to experience and express her emotions.      ASSESSMENT: Current Emotional / Mental Status (status of significant symptoms):   Risk status (Self / Other harm or suicidal ideation)   Client denies current fears or concerns for personal safety.   Client denies current or recent suicidal ideation or behaviors.   Client denies current or recent homicidal ideation or behaviors.   Client denies current or recent self injurious behavior or ideation.   Client denies other safety concerns.   Client reports there has been a change in risk factors since her last session. She states that she was recently hospitalized and is now in a rehabilitation center in [place], [place].   Client reports there has been no change in protective factors since her last session.    Recommended that the client call 911 or go to the local emergency department should there be a change in any of these risk factors.     Appearance:   Appropriate    Eye Contact:   Good    Psychomotor Behavior: Normal    Attitude:   Cooperative    Orientation:   All   Speech    Rate / Production: Normal/ Responsive    Volume:  Normal    Mood:    Normal   Affect:    Appropriate    Thought Content:  Clear    Thought Form:  Coherent  Logical    Insight:    Good      Medication Review:   No changes to current psychiatric medication(s).     Medication Compliance:   Yes     Changes in Health Issues:   None reported.     Chemical Use Review:   Substance Use: Chemical use reviewed. No active concerns identified      Tobacco Use: No current tobacco use.      Diagnosis:  1. Generalized anxiety disorder    2. Major depressive disorder, recurrent episode, moderate (H)      Collateral Reports Completed:   Not  Applicable    PLAN: (Patient Tasks / Therapist Tasks / Other)  Continue to allow herself to experience and express her emotions.         I have reviewed the note as documented above. This accurately captures the substance of my conversation with the client.  CHARLES Santamaria., BOB.                                                         ______________________________________________________________________      Treatment Plan     Patient's Name: Bess Enamorado                        YOB: 1975     Date: 2/24/2021     DSM5 Diagnoses:      296.32 (F33.1) Major Depressive Disorder, Recurrent Episode, Moderate  300.02 (F41.1) Generalized Anxiety Disorder     Psychosocial / Contextual Factors: The client is a [age] year old  single female who resides in a home in [place], [place]. The client is currently on a medical leave of absence from her job as a . The client has multiple physical health issues. In addition, she struggles to manage symptoms of major depressive disorder and generalized anxiety disorder.     WHODAS: Not Completed     Referral / Collaboration:  Referral to another professional/service is not indicated at this time.     Anticipated number of session or this episode of care: 12     MeasurableTreatment Goal(s) related to diagnosis / functional impairment(s)  Goal 1: The client will learn and apply skills to manage the symptoms of mental illness she has been experiencing.    I will know I've met my goal when I have less anxiety, more energy and more motivation.       Objective #A    The client will learn and apply mindfulness skills.  Status: Continued - Date(s): 2/24/2021      Intervention(s)  Therapist will teach and  the client in learning and applying mindfulness skills.     Objective #B  The client will learn and apply distress tolerance skills.  Status: Continued - Date(s): 2/24/2021      Intervention(s)  Therapist will teach and   the client in learning and applying distress tolerance skills.     Objective #C  The client will learn and apply emotion regulation skills.  Status: Continued - Date(s): 2/24/2021      Intervention(s)  Therapist will teach and  the client in learning and applying emotion regulation skills.     Objective #D  The client will learn and apply interpersonal effectiveness skills.                    Status: Continued - Date(s): 2/24/2021      Intervention(s)  Therapist will teach and  the client in learning and applying interpersonal effectiveness skills.        The client has verbally agreed to the above plan.        Becky Mednenhall, M.S.W., L.I.C.S.W.              February 26, 2021

## 2021-03-31 ENCOUNTER — VIRTUAL VISIT (OUTPATIENT)
Dept: PSYCHOLOGY | Facility: CLINIC | Age: 46
End: 2021-03-31
Payer: COMMERCIAL

## 2021-03-31 DIAGNOSIS — F33.1 MAJOR DEPRESSIVE DISORDER, RECURRENT EPISODE, MODERATE (H): ICD-10-CM

## 2021-03-31 DIAGNOSIS — F41.1 GENERALIZED ANXIETY DISORDER: Primary | ICD-10-CM

## 2021-03-31 PROCEDURE — 90834 PSYTX W PT 45 MINUTES: CPT | Mod: GT | Performed by: SOCIAL WORKER

## 2021-04-01 NOTE — PROGRESS NOTES
Progress Note    Patient Name: Bess Enamorado  Date: 3/31/2021       Service Type: Individual      Session Start Time: 10:30 AM  Session End Time: 11:20 AM     Session Length: 50 Minutes    Session #: 31    Attendees: Client      Mode of Communication:  Video Conference via [employer]    Telemedicine Visit: The client's condition can be safely assessed and treated via synchronous audio and visual telemedicine encounter.      Reason for Telemedicine Visit: The client was only offered a telehealth visit.    Originating Site (Client Location): [facility] in [place], [place]    Distant Site (Provider Location): Provider Remote Setting    Consent:  The client has verbally consented to the potential risks and benefits of telemedicine (video visit) versus in person care. She agreed that her insurance would be billed. She also agreed to make self-payment for services provided, if needed, and she agreed to take responsibility for payment of non-covered services.      Treatment Plan Last Reviewed: 2/24/2021  PHQ-9 / TALIB-7 : N/A    DATA  Interactive Complexity: No  Crisis: No       Progress Since Last Session (Related to Symptoms / Goals / Homework):   Symptoms: Improving : The client reported experiencing improved mood since her pain has lessened.    Homework: Achieved / completed to satisfaction. The client reported that she has continued to allow herself to experience and express her emotions.       Episode of Care Goals: Satisfactory progress - ACTION (Actively working towards change); Intervened by reinforcing change plan / affirming steps taken.     Current / Ongoing Stressors and Concerns:   The client reported that her stress has decreased. She stated that her pain has lessened since she began taking Gabapentin. She indicated that she has also been reaching her physical therapy and occupational therapy goals.     Treatment Objective(s) Addressed in This  Session:   The client will learn and apply emotion regulation skills.       Intervention:   Discussed and reinforced the client's efforts to allow herself to experience and express her emotions. Talked about her improved mood and lessened pain. Discussed and reinforced the client's efforts to reach her physical therapy and occupational therapy goals. Obtained a commitment from the client to inquire if she could get her blood drawn and attend a specialty doctor's appointment before she leaves the rehabilitation facility.      ASSESSMENT: Current Emotional / Mental Status (status of significant symptoms):   Risk status (Self / Other harm or suicidal ideation)   Client denies current fears or concerns for personal safety.   Client denies current or recent suicidal ideation or behaviors.   Client denies current or recent homicidal ideation or behaviors.   Client denies current or recent self injurious behavior or ideation.   Client denies other safety concerns.   Client reports there has been no change in risk factors since her last session.    Client reports there has been a change in protective factors since her last session. She states that her pain has lessened since she began taking Gabapentin.    Recommended that the client call 911 or go to the local emergency department should there be a change in any of these risk factors.     Appearance:   Appropriate    Eye Contact:   Good    Psychomotor Behavior: Normal    Attitude:   Cooperative    Orientation:   All   Speech    Rate / Production: Normal/ Responsive    Volume:  Normal    Mood:    Normal   Affect:    Appropriate    Thought Content:  Clear    Thought Form:  Coherent  Logical    Insight:    Good      Medication Review:   No changes to current psychiatric medication(s).     Medication Compliance:   Yes     Changes in Health Issues:   None reported.     Chemical Use Review:   Substance Use: Chemical use reviewed. No active concerns identified      Tobacco Use:  No current tobacco use.      Diagnosis:  1. Generalized anxiety disorder    2. Major depressive disorder, recurrent episode, moderate (H)      Collateral Reports Completed:   Not Applicable    PLAN: (Patient Tasks / Therapist Tasks / Other)  Inquire if she could get her blood drawn and attend a specialty doctor's appointment before she leaves the rehabilitation facility.        I have reviewed the note as documented above. This accurately captures the substance of my conversation with the client.  TALHA Santamaria.[email or web address]., BOB.                                                         ______________________________________________________________________      Treatment Plan     Patient's Name: Bess Enamorado                        YOB: 1975     Date: 2/24/2021     DSM5 Diagnoses:      296.32 (F33.1) Major Depressive Disorder, Recurrent Episode, Moderate  300.02 (F41.1) Generalized Anxiety Disorder     Psychosocial / Contextual Factors: The client is a [age] year old  single female who resides in a home in [place], [place]. The client is currently on a medical leave of absence from her job as a . The client has multiple physical health issues. In addition, she struggles to manage symptoms of major depressive disorder and generalized anxiety disorder.     WHODAS: Not Completed     Referral / Collaboration:  Referral to another professional/service is not indicated at this time.     Anticipated number of session or this episode of care: 12     MeasurableTreatment Goal(s) related to diagnosis / functional impairment(s)  Goal 1: The client will learn and apply skills to manage the symptoms of mental illness she has been experiencing.    I will know I've met my goal when I have less anxiety, more energy and more motivation.       Objective #A    The client will learn and apply mindfulness skills.  Status: Continued - Date(s): 2/24/2021      Intervention(s)  Therapist  will teach and  the client in learning and applying mindfulness skills.     Objective #B  The client will learn and apply distress tolerance skills.  Status: Continued - Date(s): 2/24/2021      Intervention(s)  Therapist will teach and  the client in learning and applying distress tolerance skills.     Objective #C  The client will learn and apply emotion regulation skills.  Status: Continued - Date(s): 2/24/2021      Intervention(s)  Therapist will teach and  the client in learning and applying emotion regulation skills.     Objective #D  The client will learn and apply interpersonal effectiveness skills.                    Status: Continued - Date(s): 2/24/2021      Intervention(s)  Therapist will teach and  the client in learning and applying interpersonal effectiveness skills.        The client has verbally agreed to the above plan.        Becky Mendenhall M.S.W., L.I.C.S.W.              February 26, 2021

## 2021-04-06 ENCOUNTER — PATIENT OUTREACH (OUTPATIENT)
Dept: NURSING | Facility: CLINIC | Age: 46
End: 2021-04-06
Payer: COMMERCIAL

## 2021-04-06 NOTE — PROGRESS NOTES
Clinic Care Coordination Contact  Care Team Conversations    RN clinic care coordinator outre[facility] [place] [place] [phone][facility]    Patient remains in TCU. She has been having ongoing video visits with psychology during her stay.     Patient continues to participate in therapies.    No discharge plans are noted at this time.     Care coordinator will follow up in one month to determine status     Eduarda Anderson RN, [facility] - Primary Care Clinic RN Coordinator  [facility]   4/6/2021    2:43 PM  [phone]

## 2021-04-07 ENCOUNTER — TRANSFERRED RECORDS (OUTPATIENT)
Dept: HEALTH INFORMATION MANAGEMENT | Facility: CLINIC | Age: 46
End: 2021-04-07

## 2021-04-15 ENCOUNTER — TELEPHONE (OUTPATIENT)
Dept: FAMILY MEDICINE | Facility: CLINIC | Age: 46
End: 2021-04-15

## 2021-04-15 ENCOUNTER — VIRTUAL VISIT (OUTPATIENT)
Dept: FAMILY MEDICINE | Facility: CLINIC | Age: 46
End: 2021-04-15
Payer: COMMERCIAL

## 2021-04-15 DIAGNOSIS — G47.33 OSA (OBSTRUCTIVE SLEEP APNEA): ICD-10-CM

## 2021-04-15 DIAGNOSIS — Z30.41 ENCOUNTER FOR SURVEILLANCE OF CONTRACEPTIVE PILLS: ICD-10-CM

## 2021-04-15 DIAGNOSIS — F41.1 GENERALIZED ANXIETY DISORDER: ICD-10-CM

## 2021-04-15 DIAGNOSIS — G35 MULTIPLE SCLEROSIS (H): Primary | Chronic | ICD-10-CM

## 2021-04-15 DIAGNOSIS — E28.2 POLYCYSTIC OVARIES: ICD-10-CM

## 2021-04-15 DIAGNOSIS — I10 BENIGN ESSENTIAL HYPERTENSION: Chronic | ICD-10-CM

## 2021-04-15 DIAGNOSIS — F33.1 MODERATE EPISODE OF RECURRENT MAJOR DEPRESSIVE DISORDER (H): ICD-10-CM

## 2021-04-15 DIAGNOSIS — R00.0 SINUS TACHYCARDIA: ICD-10-CM

## 2021-04-15 PROCEDURE — 99495 TRANSJ CARE MGMT MOD F2F 14D: CPT | Performed by: FAMILY MEDICINE

## 2021-04-15 RX ORDER — METOPROLOL SUCCINATE 25 MG/1
25 TABLET, EXTENDED RELEASE ORAL DAILY
Qty: 90 TABLET | Refills: 1 | Status: SHIPPED | OUTPATIENT
Start: 2021-04-15 | End: 2021-12-15

## 2021-04-15 RX ORDER — SERTRALINE HYDROCHLORIDE 100 MG/1
100 TABLET, FILM COATED ORAL DAILY
Qty: 90 TABLET | Refills: 3 | Status: SHIPPED | OUTPATIENT
Start: 2021-04-15 | End: 2022-04-21

## 2021-04-15 RX ORDER — LISINOPRIL 20 MG/1
20 TABLET ORAL DAILY
Qty: 90 TABLET | Refills: 3 | Status: ON HOLD | OUTPATIENT
Start: 2021-04-15 | End: 2022-02-18

## 2021-04-15 RX ORDER — DESOGESTREL AND ETHINYL ESTRADIOL 0.15-0.03
KIT ORAL
Qty: 112 TABLET | Refills: 3 | Status: ON HOLD | OUTPATIENT
Start: 2021-04-15 | End: 2022-02-17

## 2021-04-15 RX ORDER — GABAPENTIN 100 MG/1
200 CAPSULE ORAL 3 TIMES DAILY
Qty: 540 CAPSULE | Refills: 1 | Status: SHIPPED | OUTPATIENT
Start: 2021-04-15 | End: 2021-12-15

## 2021-04-15 ASSESSMENT — ANXIETY QUESTIONNAIRES
3. WORRYING TOO MUCH ABOUT DIFFERENT THINGS: NOT AT ALL
6. BECOMING EASILY ANNOYED OR IRRITABLE: NOT AT ALL
IF YOU CHECKED OFF ANY PROBLEMS ON THIS QUESTIONNAIRE, HOW DIFFICULT HAVE THESE PROBLEMS MADE IT FOR YOU TO DO YOUR WORK, TAKE CARE OF THINGS AT HOME, OR GET ALONG WITH OTHER PEOPLE: NOT DIFFICULT AT ALL
5. BEING SO RESTLESS THAT IT IS HARD TO SIT STILL: NOT AT ALL
2. NOT BEING ABLE TO STOP OR CONTROL WORRYING: SEVERAL DAYS
GAD7 TOTAL SCORE: 1
1. FEELING NERVOUS, ANXIOUS, OR ON EDGE: NOT AT ALL
7. FEELING AFRAID AS IF SOMETHING AWFUL MIGHT HAPPEN: NOT AT ALL

## 2021-04-15 ASSESSMENT — PATIENT HEALTH QUESTIONNAIRE - PHQ9
5. POOR APPETITE OR OVEREATING: NOT AT ALL
SUM OF ALL RESPONSES TO PHQ QUESTIONS 1-9: 0

## 2021-04-15 NOTE — PROGRESS NOTES
"Bess is a 45 year old who is being evaluated via a billable video visit.      How would you like to obtain your AVS? MyChart  If the video visit is dropped, the invitation should be resent by: Send to e-mail at: [email or web address]  Will anyone else be joining your video visit? No      Video Start Time: 1:16 PM    Assessment & Plan     Multiple sclerosis (H)  Doing home PT/OT  - gabapentin (NEURONTIN) 100 MG capsule; Take 2 capsules (200 mg) by mouth 3 times daily    Benign essential hypertension  Has been well controlled in TCU  - metoprolol succinate ER (TOPROL-XL) 25 MG 24 hr tablet; Take 1 tablet (25 mg) by mouth daily  - lisinopril (ZESTRIL) 20 MG tablet; Take 1 tablet (20 mg) by mouth daily    Sinus tachycardia     - metoprolol succinate ER (TOPROL-XL) 25 MG 24 hr tablet; Take 1 tablet (25 mg) by mouth daily    Moderate episode of recurrent major depressive disorder (H)  stable  - sertraline (ZOLOFT) 100 MG tablet; Take 1 tablet (100 mg) by mouth daily    Generalized anxiety disorder  stable  - sertraline (ZOLOFT) 100 MG tablet; Take 1 tablet (100 mg) by mouth daily    Polycystic ovaries     - desogestrel-ethinyl estradiol (APRI) 0.15-30 MG-MCG tablet; TAKE 1 TABLET DAILY CONTINUOUSLY-OMIT PLACEBPO TABS UNTIL AFTER 3 MONTHS OF HORMONE TABS    Encounter for surveillance of contraceptive pills     - desogestrel-ethinyl estradiol (APRI) 0.15-30 MG-MCG tablet; TAKE 1 TABLET DAILY CONTINUOUSLY-OMIT PLACEBPO TABS UNTIL AFTER 3 MONTHS OF HORMONE TABS    NARCISA (obstructive sleep apnea)  Needs new mask/tubing, etc  - SLEEP EVALUATION & MANAGEMENT REFERRAL - [facility] [place]  [phone] (Age 15 and up); Future             BMI:   Estimated body mass index is 77.32 kg/m  as calculated from the following:    Height as of 3/4/21: 1.6 m (5' 3\").    Weight as of 3/5/21: 198 kg (436 lb 8.2 oz).   Weight management plan:          No follow-ups on file.    Mikala Luna MD  [facility] " CLINIC [place]    Cornell Kellogg is a 45 year old who presents for the following health issues  accompanied by her self:    [facility]       Hospital Follow-up Visit:    Hospital/Nursing Home/IP Rehab Facility: [facility]  Date of Admission: 3/4/21  Date of Discharge: 3/8/21  Reason(s) for Admission: cellulitis, UTI      Was your hospitalization related to COVID-19? No   Problems taking medications regularly:  None  Medication changes since discharge: gabapentin  Problems adhering to non-medication therapy:  None    Summary of hospitalization:  [facility] discharge summary reviewed  Diagnostic Tests/Treatments reviewed.  Follow up needed: Follow-up with provider at TCU within 1 week, reassess oxygen needs and follow-up on final culture results at that time.    Other Healthcare Providers Involved in Patient s Care:         None - in process of getting PCA  Update since discharge: improved.       Post Discharge Medication Reconciliation: discharge medications reconciled and changed, per note/orders.  Plan of care communicated with patient           [facility], discharged from there 4/14/2021.    Feels she's doing very well.  Judy she got there she couldn't walk.  Was able to walk 35 feet, rested, walked another 30 feet.    This was just prior to discharge.     Now will have home health nurse and physical therapy and OT.  Will have lymphedema OT check in with her.  They will be sending me records and orders to sign.     OT at TCU told her not to shower until home OT can be there to witness it the first time.      The cellulitis she had at the hospital has cleared up completely.  Wearing garments about 27/7.          Hypertension Follow-up      Do you check your blood pressure regularly outside of the clinic? No     Are you following a low salt diet? Yes    Are your blood pressures ever more than 140 on the top number (systolic) OR more   than 90 on the bottom number (diastolic), for  example 140/90? Yes    Depression and Anxiety Follow-Up    How are you doing with your depression since your last visit? No change    How are you doing with your anxiety since your last visit?  No change    Are you having other symptoms that might be associated with depression or anxiety? No    Have you had a significant life event? Health Concerns     Do you have any concerns with your use of alcohol or other drugs? No    Social History     Tobacco Use     Smoking status: Never Smoker     Smokeless tobacco: Never Used   Substance Use Topics     Alcohol use: Yes     Comment: social     Drug use: No     PHQ 4/20/2020 12/3/2020 4/15/2021   PHQ-9 Total Score 6 5 0   Q9: Thoughts of better off dead/self-harm past 2 weeks Not at all Not at all Not at all     TALIB-7 SCORE 4/20/2020 12/3/2020 4/15/2021   Total Score - - -   Total Score 2 4 1     Last PHQ-9 4/15/2021   1.  Little interest or pleasure in doing things 0   2.  Feeling down, depressed, or hopeless 0   3.  Trouble falling or staying asleep, or sleeping too much 0   4.  Feeling tired or having little energy 0   5.  Poor appetite or overeating 0   6.  Feeling bad about yourself 0   7.  Trouble concentrating 0   8.  Moving slowly or restless 0   Q9: Thoughts of better off dead/self-harm past 2 weeks 0   PHQ-9 Total Score 0   Difficulty at work, home, or with people Not difficult at all     TALIB-7  4/15/2021   1. Feeling nervous, anxious, or on edge 0   2. Not being able to stop or control worrying 1   3. Worrying too much about different things 0   4. Trouble relaxing 0   5. Being so restless that it is hard to sit still 0   6. Becoming easily annoyed or irritable 0   7. Feeling afraid, as if something awful might happen 0   TALIB-7 Total Score 1   If you checked any problems, how difficult have they made it for you to do your work, take care of things at home, or get along with other people? Not difficult at all       Suicide Assessment Five-step Evaluation and  Treatment (SAFE-T)      How many servings of fruits and vegetables do you eat daily?  2-3    On average, how many sweetened beverages do you drink each day (Examples: soda, juice, sweet tea, etc.  Do NOT count diet or artificially sweetened beverages)?   2 glasses of koolaid a day    How many days per week do you exercise enough to make your heart beat faster? 3 or less    How many minutes a day do you exercise enough to make your heart beat faster? 9 or less    How many days per week do you miss taking your medication? 0      Review of Systems   Constitutional, HEENT, cardiovascular, pulmonary, gi and gu systems are negative, except as otherwise noted.      Objective           Vitals:  No vitals were obtained today due to virtual visit.    Physical Exam   GENERAL: healthy, alert, no distress and obese  EYES: Eyes grossly normal to inspection.  No discharge or erythema, or obvious scleral/conjunctival abnormalities.  RESP: No audible wheeze, cough, or visible cyanosis.  No visible retractions or increased work of breathing.    SKIN: Visible skin clear. No significant rash, abnormal pigmentation or lesions.  NEURO: Cranial nerves grossly intact.  Mentation and speech appropriate for age.  PSYCH: Mentation appears normal, affect normal/bright, judgement and insight intact, normal speech and appearance well-groomed.                Video-Visit Details    Type of service:  Video Visit    Video End Time:1:25 PM    Originating Location (pt. Location): Home    Distant Location (provider location):  [facility]     Platform used for Video Visit: [employer]

## 2021-04-15 NOTE — PATIENT INSTRUCTIONS
Our Clinic hours are:  Mondays    7:20 am - 7 pm  Tues -  Fri  7:20 am - 5 pm    Clinic Phone: [phone]    The clinic lab opens at 7:30 am Mon - Fri and appointments are required.    [facility]. [phone]  Monday  8 am - 7pm  Tues - Fri 8 am - 5:30 pm

## 2021-04-15 NOTE — TELEPHONE ENCOUNTER
[facility] now requests orders and shares plan of care/discharge summaries for some patients through [employer].  Please REPLY TO THIS MESSAGE OR ROUTE BACK TO THE AUTHOR in order to give authorization for orders when needed.  This is considered a verbal order, you will still receive a faxed copy of orders for signature.  Thank you for your assistance in improving collaboration for our patients.    ORDER    SN 1w3, 2 PRN    OT Eval for safety with ADLs and equipment needs    PT Eval for strengthening and development of HEP    OT-LT eval for edema in BLE    HHA offered but declined, client states that she will have PCA services soon through [facility] and is aware that she can add later during home care episode.      Thanks,    Genaro Lopez RN

## 2021-04-16 ASSESSMENT — ANXIETY QUESTIONNAIRES: GAD7 TOTAL SCORE: 1

## 2021-04-21 ENCOUNTER — TELEPHONE (OUTPATIENT)
Dept: FAMILY MEDICINE | Facility: CLINIC | Age: 46
End: 2021-04-21

## 2021-04-21 NOTE — TELEPHONE ENCOUNTER
[facility] now requests orders and shares plan of care/discharge summaries for some patients through [facility].  Please REPLY TO THIS MESSAGE OR ROUTE BACK TO THE AUTHOR in order to give authorization for orders when needed.  This is considered a verbal order, you will still receive a faxed copy of orders for signature.  Thank you for your assistance in improving collaboration for our patients.    ORDER: OT LT 1w1 to treat bilateral lower extremity lymphedema.    Majo Marinelli OT, BRIDGER  [email or web address]  [phone]

## 2021-04-21 NOTE — TELEPHONE ENCOUNTER
[facility] now requests orders and shares plan of care/discharge summaries for some patients through [employer].  Please REPLY TO THIS MESSAGE OR ROUTE BACK TO THE AUTHOR in order to give authorization for orders when needed.  This is considered a verbal order, you will still receive a faxed copy of orders for signature.  Thank you for your assistance in improving collaboration for our patients.    ORDER: PT korina completed. Requesting to continue PT 1w1, 2w3 for therapeutic exercise/HEP instruction and gait training to facilitate improved functional mobility in home.    Thank you,  Lynette Gamez, PT  [email or web address]  [phone]

## 2021-04-26 ENCOUNTER — MYC MEDICAL ADVICE (OUTPATIENT)
Dept: FAMILY MEDICINE | Facility: CLINIC | Age: 46
End: 2021-04-26

## 2021-05-05 ENCOUNTER — VIRTUAL VISIT (OUTPATIENT)
Dept: PSYCHOLOGY | Facility: CLINIC | Age: 46
End: 2021-05-05
Payer: COMMERCIAL

## 2021-05-05 DIAGNOSIS — F41.1 GENERALIZED ANXIETY DISORDER: Primary | ICD-10-CM

## 2021-05-05 DIAGNOSIS — F33.1 MAJOR DEPRESSIVE DISORDER, RECURRENT EPISODE, MODERATE (H): ICD-10-CM

## 2021-05-05 PROCEDURE — 90834 PSYTX W PT 45 MINUTES: CPT | Mod: 95 | Performed by: SOCIAL WORKER

## 2021-05-06 ENCOUNTER — PATIENT OUTREACH (OUTPATIENT)
Dept: NURSING | Facility: CLINIC | Age: 46
End: 2021-05-06
Payer: COMMERCIAL

## 2021-05-06 ENCOUNTER — IMMUNIZATION (OUTPATIENT)
Dept: FAMILY MEDICINE | Facility: CLINIC | Age: 46
End: 2021-05-06
Payer: COMMERCIAL

## 2021-05-06 PROCEDURE — 91301 PR COVID VAC MODERNA 100 MCG/0.5 ML IM: CPT

## 2021-05-06 PROCEDURE — 0011A PR COVID VAC MODERNA 100 MCG/0.5 ML IM: CPT

## 2021-05-06 ASSESSMENT — ACTIVITIES OF DAILY LIVING (ADL): DEPENDENT_IADLS:: CLEANING;SHOPPING

## 2021-05-06 NOTE — PROGRESS NOTES
Clinic Care Coordination Contact    Clinic Care Coordination Contact  OUTREACH    Referral Information:  Referral Source: IP Handoff    Primary Diagnosis: SIRS/Sepsis    Chief Complaint   Patient presents with     Clinic Care Coordination - Follow-up     RN        Universal Utilization: Psychology  Clinic Utilization  Difficulty keeping appointments:: No  Compliance Concerns: No  No-Show Concerns: No  No PCP office visit in Past Year: No  Utilization    Last refreshed: 5/6/2021 11:40 AM: Hospital Admissions 1           Last refreshed: 5/6/2021 11:40 AM: ED Visits 1           Last refreshed: 5/6/2021 11:40 AM: No Show Count (past year) 0              Current as of: 5/6/2021 11:40 AM          Clinical Concerns:    Current Medical Concerns:  Patient discharged from [facility] on 4/14/21 without notification to RN CC.    Patient states she is doing well at home. She has had OT for lymphedema treatments. She states she thinks these will be done soon but she will continue to have PT for longer.     No signs of cellulitis in lower extremities at this time. She is wearing compression garments 24/7 except fr bathing.       Current Behavioral Concerns: Patient follows weekly with psychology. Utilizes medications and counseling.    Patient states she had her first covid-19 vaccination today. No side effects at this time.       Education Provided to patient: Role of care coordination     Pain  Pain (GOAL):: No    Health Maintenance Reviewed:    Health Maintenance Due   Topic Date Due     HEPATITIS C SCREENING  Never done     MAMMO SCREENING  12/01/2006     PREVENTIVE CARE VISIT  06/26/2018     DTAP/TDAP/TD IMMUNIZATION (5 - Td) 04/27/2019     LIPID  02/11/2021     HPV TEST  10/20/2021     PAP  10/20/2021     Clinical Pathway: None    Medication Management:  Medication reconciliation status: Medications reviewed and reconciled.  Continue medications without change. Patient is knowledgeable and independent in  management.     Functional Status:  Dependent ADLs:: Ambulation-walker, Wheelchair-independent  Dependent IADLs:: Cleaning, Shopping  Mobility Status: Independent w/Device  Fallen 2 or more times in the past year?: No  Any fall with injury in the past year?: No    Living Situation:  Current living arrangement:: I live alone  Type of residence:: Private home - stairs    Lifestyle & Psychosocial Needs:  Lifestyle     Physical activity     Days per week: 0 days     Minutes per session: 0 min     Stress: Only a little     Social Needs     Financial resource strain: Not hard at all     Food insecurity     Worry: Never true     Inability: Never true     Transportation needs     Medical: No     Non-medical: No     Diet:: Regular  Inadequate nutrition (GOAL):: No  Tube Feeding: No  Inadequate activity/exercise (GOAL):: No  Transportation means:: Accessible car- patient does drive     Orthodox or spiritual beliefs that impact treatment:: No  Mental health DX:: Yes  Mental health DX how managed:: Medication, Outpatient Counseling  Mental health management concern (GOAL):: No  Chemical Dependency Status: No Current Concerns  Informal Support system:: Friends   Socioeconomic History     Marital status: Single     Spouse name: Not on file     Number of children: Not on file     Years of education: Not on file     Highest education level: Not on file   Occupational History     Employer: [employer]   Relationships     Social connections     Talks on phone: Once a week     Gets together: Never     Attends Confucianist service: Never     Active member of club or organization: No     Attends meetings of clubs or organizations: Never     Relationship status: Never      Intimate partner violence     Fear of current or ex partner: No     Emotionally abused: No     Physically abused: No     Forced sexual activity: No     Tobacco Use     Smoking status: Never Smoker     Smokeless tobacco: Never Used   Substance and Sexual  Activity     Alcohol use: Yes     Comment: social     Drug use: No     Sexual activity: Not Currently     Partners: Male     Birth control/protection: Pill        Resources and Interventions:  Current Resources: [facility]  Skilled Home Care Services: Occupational Therapy, Physicial Therapy  Community Resources: Home Care  Supplies used at home:: Other, Compression Stockings, Nebulizer tubing(lymphedema wraps)  Equipment Currently Used at Home: walker, rolling, wheelchair, manual, shower chair  Employment Status: disabled    Advance Care Plan/Directive  Advanced Care Plans/Directives on file:: No    Patient/Caregiver understanding: Expresses understanding   Future Appointments              In 2 weeks Verito Husain [facility]    In 2 weeks Becky Mendenhall LSW [facility], [facility]    In 4 weeks [place] COVID VACCINE 28 DAY MODERNA [facility]  Arrive at: COVID-19 Vaccine [place]    In 1 month Becky Mendenhall LSW [facility], [facility]    [facility]        Plan: Patient declines the need for ongoing care coordination at this time.     Eduarda Anderson RN, [facility] RN Coordinator  [facility]   5/6/2021    3:28 PM  [phone]

## 2021-05-06 NOTE — PROGRESS NOTES
Progress Note    Patient Name: Bess Enamorado  Date: 5/5/2021       Service Type: Individual      Session Start Time: 12:30 PM  Session End Time: 1:20 PM     Session Length: 50 Minutes    Session #: 32    Attendees: Client      Mode of Communication:  Video Conference via [place]    Telemedicine Visit: The client's condition can be safely assessed and treated via synchronous audio and visual telemedicine encounter.      Reason for Telemedicine Visit: The client was only offered a telehealth visit.    Originating Site (Client Location): Client's Home    Distant Site (Provider Location): Provider Remote Setting    Consent:  The client has verbally consented to the potential risks and benefits of telemedicine (video visit) versus in person care. She agreed that her insurance would be billed. She also agreed to make self-payment for services provided, if needed, and she agreed to take responsibility for payment of non-covered services.      Treatment Plan Last Reviewed: 2/24/2021  PHQ-9 / TALIB-7 : N/A    DATA  Interactive Complexity: No  Crisis: No       Progress Since Last Session (Related to Symptoms / Goals / Homework):   Symptoms: Improving : The client reported experiencing decreased symptoms of anxiety.    Homework: Achieved / completed to satisfaction. The client reported that she did inquire if she could get her blood drawn and attend a specialty doctor's appointment before she left the rehabilitation facility.       Episode of Care Goals: Satisfactory progress - ACTION (Actively working towards change); Intervened by reinforcing change plan / affirming steps taken.     Current / Ongoing Stressors and Concerns:   The client reported that her stress has increased. She indicated that her interpersonal stressors have increased.     Treatment Objective(s) Addressed in This Session:   The client will learn and apply emotion regulation skills. The client will learn and  apply interpersonal effectiveness skills.      Intervention:   Talked about the client's efforts to reach her physical therapy and occupational therapy goals. Examined the client's recent interpersonal stressors. Discussed and reinforced her efforts to attempt to communicate effectively with her mother.       ASSESSMENT: Current Emotional / Mental Status (status of significant symptoms):   Risk status (Self / Other harm or suicidal ideation)   Client denies current fears or concerns for personal safety.   Client denies current or recent suicidal ideation or behaviors.   Client denies current or recent homicidal ideation or behaviors.   Client denies current or recent self injurious behavior or ideation.   Client denies other safety concerns.   Client reports there has been a change in risk factors since her last session. She indicates that conflict with her mother has increased.   Client reports there has been a change in protective factors since her last session. She has returned home. She indicates that she has driven her car.    Recommended that the client call 911 or go to the local emergency department should there be a change in any of these risk factors.     Appearance:   Appropriate    Eye Contact:   Good    Psychomotor Behavior: Normal    Attitude:   Cooperative    Orientation:   All   Speech    Rate / Production: Normal/ Responsive    Volume:  Normal    Mood:    Normal   Affect:    Appropriate    Thought Content:  Clear    Thought Form:  Coherent  Logical    Insight:    Good      Medication Review:   No changes to current psychiatric medication(s).     Medication Compliance:   Yes     Changes in Health Issues:   None reported.     Chemical Use Review:   Substance Use: Chemical use reviewed. No active concerns identified      Tobacco Use: No current tobacco use.      Diagnosis:  1. Generalized anxiety disorder    2. Major depressive disorder, recurrent episode, moderate (H)      Collateral Reports  Completed:   Not Applicable    PLAN: (Patient Tasks / Therapist Tasks / Other)  N/A        I have reviewed the note as documented above. This accurately captures the substance of my conversation with the client.  CHARLES Santamaria., BOB.                                                         ______________________________________________________________________      Treatment Plan     Patient's Name: Bess Enamorado                        YOB: 1975     Date: 2/24/2021     DSM5 Diagnoses:      296.32 (F33.1) Major Depressive Disorder, Recurrent Episode, Moderate  300.02 (F41.1) Generalized Anxiety Disorder     Psychosocial / Contextual Factors: The client is a [age] year old  single female who resides in a home in [place], [place]. The client is currently on a medical leave of absence from her job as a . The client has multiple physical health issues. In addition, she struggles to manage symptoms of major depressive disorder and generalized anxiety disorder.     WHODAS: Not Completed     Referral / Collaboration:  Referral to another professional/service is not indicated at this time.     Anticipated number of session or this episode of care: 12     MeasurableTreatment Goal(s) related to diagnosis / functional impairment(s)  Goal 1: The client will learn and apply skills to manage the symptoms of mental illness she has been experiencing.    I will know I've met my goal when I have less anxiety, more energy and more motivation.       Objective #A    The client will learn and apply mindfulness skills.  Status: Continued - Date(s): 2/24/2021      Intervention(s)  Therapist will teach and  the client in learning and applying mindfulness skills.     Objective #B  The client will learn and apply distress tolerance skills.  Status: Continued - Date(s): 2/24/2021      Intervention(s)  Therapist will teach and  the client in learning and applying distress  tolerance skills.     Objective #C  The client will learn and apply emotion regulation skills.  Status: Continued - Date(s): 2/24/2021      Intervention(s)  Therapist will teach and  the client in learning and applying emotion regulation skills.     Objective #D  The client will learn and apply interpersonal effectiveness skills.                    Status: Continued - Date(s): 2/24/2021      Intervention(s)  Therapist will teach and  the client in learning and applying interpersonal effectiveness skills.        The client has verbally agreed to the above plan.        Becky Mendenhall M.S.W., L.I.C.S.W.              February 26, 2021

## 2021-05-13 ENCOUNTER — TELEPHONE (OUTPATIENT)
Dept: FAMILY MEDICINE | Facility: CLINIC | Age: 46
End: 2021-05-13

## 2021-05-13 ENCOUNTER — MEDICAL CORRESPONDENCE (OUTPATIENT)
Dept: HEALTH INFORMATION MANAGEMENT | Facility: CLINIC | Age: 46
End: 2021-05-13

## 2021-05-13 DIAGNOSIS — G35 MULTIPLE SCLEROSIS (H): Primary | Chronic | ICD-10-CM

## 2021-05-13 NOTE — TELEPHONE ENCOUNTER
Order placed by physician.  Please fax to [employer] - phone number below.      Mikala Luna M.D.

## 2021-05-13 NOTE — TELEPHONE ENCOUNTER
Pt has finished homecare PT, would most benefit from outpatient physical therapy. Please place an order for outpatient physical therapy at:    [facility] ([employer] [facility])  [address] [place] [address] [id number]  [place] [address]  Phone Number: [phone]    Thank you!

## 2021-05-25 ENCOUNTER — VIRTUAL VISIT (OUTPATIENT)
Dept: SLEEP MEDICINE | Facility: CLINIC | Age: 46
End: 2021-05-25
Attending: FAMILY MEDICINE
Payer: COMMERCIAL

## 2021-05-25 VITALS — WEIGHT: 293 LBS | BODY MASS INDEX: 51.91 KG/M2 | HEIGHT: 63 IN

## 2021-05-25 DIAGNOSIS — R09.02 HYPOXEMIA: Primary | ICD-10-CM

## 2021-05-25 DIAGNOSIS — G47.33 OSA (OBSTRUCTIVE SLEEP APNEA): ICD-10-CM

## 2021-05-25 DIAGNOSIS — R06.89 HYPOVENTILATION: ICD-10-CM

## 2021-05-25 PROCEDURE — 99203 OFFICE O/P NEW LOW 30 MIN: CPT | Mod: GT | Performed by: PHYSICIAN ASSISTANT

## 2021-05-25 ASSESSMENT — MIFFLIN-ST. JEOR: SCORE: 2423.52

## 2021-05-25 NOTE — PROGRESS NOTES
Bess is a 45 year old who is being evaluated via a billable video visit.      How would you like to obtain your AVS? MyChart  If the video visit is dropped, the invitation should be resent by: Send to e-mail at: [email or web address]  Will anyone else be joining your video visit? No      Video Start Time: 1:34 PM  Video-Visit Details    Type of service:  Video Visit    Video End Time:1:56 PM    Originating Location (pt. Location): Home    Distant Location (provider location):  [facility]     Platform used for Video Visit: [email or web address]    Does patient have any form of state insurance? yes    Do you have wifi? yes  Do you have a smart phone? yes  Can you download an luba on your phone comfortably with out assistance? yes  Can you watch a [employer] video? Yes  Luisa MOONEY CMA, [facility], 5/25/2021 10:27 AM      Sleep Consultation:    Date on this visit: 5/25/2021    Bess Enamorado is sent by Mikala Luna for a sleep consultation regarding obstructive sleep apnea.    Primary Physician: Mikala Luna     Chief Complaint   Patient presents with     Consult     dx with NARCISA, currently has CPAP: pt has not been using due to needing new machine       Bess Enamorado was diagnosed with severe sleep apnea.  Polysomnography - Test date 8/9/2017 (320#):AHI 45.9, basline SpO2 92.9%, mike SpO2 54.9%, time of SpO2 <= 88% of 31.7 minutes.  Severe desaturations and sustained hypoxemia confined to singular supine REM period (no lateral REM observed for comparison).  Also seen to have TCM increase by ~15 mmHg over baseline in supine REM, consistent with hypoventilation.  Pre-study VBG was WNL. CPAP titrated to 10 cm H2O and appeared optimal, including supine REM, with normalization of SpO2 and TCM normalized to low-40's.  Seen to have frequent PLM's (64.3 / hour on diagnostic, 89.6 / hour on treatment, ~20% associated with cortical arousals).     She was diagnosed with severe NARCISA with sleep-associated  hypoxemia, with likely REM-related hypoventilation and prescribed CPAP 10 cm/H20. No follow up since CPAP was initiated on 8/22/2017.  She wants to obtain new mask. She stopped using CPAP in 2018 because she did not like the nasal mask. She was recently in rehab and used a full face mask and liked that mask.     Bess goes to sleep at 12:00 AM during the week. She wakes up at 9:00 AM without an alarm. She falls asleep in 15 minutes.  Bess denies difficulty falling asleep.  She wakes up hourly to use the bathroom.  On weekends, the sleep schedule is similar. Patient gets an average of 5 hours of sleep per night. She feels rested.     Patient does watch TV in bed.     Bess does not do shift work.      Bess does snore every night and snoring is loud. Patient does not have a regular bed partner.  Patient sleeps on her back. She has occasional morning headaches, denies no morning confusion and restless legs. Bess denies any bruxism, sleep walking, sleep talking, dream enactment, sleep paralysis, cataplexy and hypnogogic/hypnopompic hallucinations.    Bess denies difficulty breathing through her nose.      Bess has gained 80 pounds since her last sleep study.  Patient describes themself as a night person. Patient's [place] Sleepiness score 8/24 inconsistent with excessive  daytime sleepiness.      Bess naps 1-2 times per week for  minutes, feels refreshed after naps. She takes some inadvertant naps.  She denies falling asleep while driving.  Patient was counseled on the importance of driving while alert, to pull over if drowsy, or nap before getting into the vehicle if sleepy.  She uses 1 cups/day of coffee. Last caffeine intake is usually before noon.    Allergies:    Allergies   Allergen Reactions     Nkda [No Known Drug Allergies]        Medications:    Current Outpatient Medications   Medication Sig Dispense Refill     acetaminophen (TYLENOL) 500 MG tablet Take 500-1,000 mg by mouth once as needed for mild pain        Cholecalciferol (VITAMIN D3 PO) Take 10,000 Units by mouth daily        desogestrel-ethinyl estradiol (APRI) 0.15-30 MG-MCG tablet TAKE 1 TABLET DAILY CONTINUOUSLY-OMIT PLACEBPO TABS UNTIL AFTER 3 MONTHS OF HORMONE TABS 112 tablet 3     gabapentin (NEURONTIN) 100 MG capsule Take 2 capsules (200 mg) by mouth 3 times daily 540 capsule 1     ibuprofen (ADVIL/MOTRIN) 400 MG tablet Take 1 tablet (400 mg) by mouth every 6 hours as needed for moderate pain       lisinopril (ZESTRIL) 20 MG tablet Take 1 tablet (20 mg) by mouth daily 90 tablet 3     metoprolol succinate ER (TOPROL-XL) 25 MG 24 hr tablet Take 1 tablet (25 mg) by mouth daily 90 tablet 1     miconazole (MICATIN) 2 % external powder Apply topically 2 times daily       order for DME Equipment ordered: RESMED CPAP Mask type: Nasal Settings: 10 CM H2O       sertraline (ZOLOFT) 100 MG tablet Take 1 tablet (100 mg) by mouth daily 90 tablet 3       Problem List:  Patient Active Problem List    Diagnosis Date Noted     Cellulitis 03/05/2021     Priority: Medium     Tachycardia 03/04/2021     Priority: Medium     SIRS (systemic inflammatory response syndrome) (H) 03/04/2021     Priority: Medium     Cellulitis of right leg 03/04/2021     Priority: Medium     Lymphedema of both lower extremities 06/15/2020     Priority: Medium     Generalized anxiety disorder 03/23/2020     Priority: Medium     History of abnormal cervical Pap smear 08/26/2019     Priority: Medium     Cellulitis of abdominal wall 10/22/2018     Priority: Medium     Moderate episode of recurrent major depressive disorder (H) 03/23/2018     Priority: Medium     NARCISA (obstructive sleep apnea) 08/22/2017     Priority: Medium     Severe NARCISA with sleep-associated hypoxemia, with likely REM-related hypoventilation  Polysomnography - Test date 8/9/2017  AHI 45.9, basline SpO2 92.9%, mike SpO2 54.9%, time of SpO2 <= 88% of 31.7 minutes.  Severe desaturations and sustained hypoxemia confined to singular supine  REM period (no lateral REM observed for comparison).  Also seen to have TCM increase by ~15 mmHg over baseline in supine REM, consistent with hypoventilation.  Pre-study VBG was WNL.  CPAP titrated to 10 cm H2O and appeared optimal, including supine REM, with normalization of SpO2 and TCM normalized to low-40's.  Seen to have frequent PLM's (64.3 / hour on diagnostic, 89.6 / hour on treatment, ~20% associated with cortical arousals).         Benign essential hypertension 02/15/2016     Priority: Medium     Peroneal tendon rupture 11/23/2015     Priority: Medium     Morbid obesity (H) 08/24/2015     Priority: Medium     Oct 2016:  Starting [facility] program in [place], goal to lose 140 lbs over next 2 years       Papanicolaou smear of cervix with low grade squamous intraepithelial lesion (LGSIL) 08/03/2014     Priority: Medium     7/29/2014:Pap--LSIL. Neg high risk HPV. Plan cotest in 1 year (if this is ASCUS or LSIL then colp). In reminders  8/20/15: Pap - ASCUS, Neg HPV. Plan colp  9/14/15: [place] Bx & ECC - negative. Plan cotest in 1 year.   10/20/16: NIL Pap, Neg HPV. Plan cotest in 3 years.        Eating disorder 08/25/2013     Priority: Medium     Binge-eating disorder; social phobia  See psychological assessment from [facility], Dr. Anupama Bowie, 8/14/2013  Problem list name updated by automated process. Provider to review       Leg edema 04/19/2013     Priority: Medium     Anxiety 06/28/2011     Priority: Medium     Followed by neurologist       Restless legs 06/28/2011     Priority: Medium     CARDIOVASCULAR SCREENING; LDL GOAL LESS THAN 160 10/31/2010     Priority: Medium     Constipation 09/18/2006     Priority: Medium     9/18/2006    Has tried otc suppository and otc lax.   Problem list name updated by automated process. Provider to review       Multiple sclerosis (H) 08/29/2005     Priority: Medium     9/18/200pt dx with MS 2004--dx by neurolgist and is followed by Dr. Jeet Nicholson, also  MS nurse Heather Thomas       Polycystic ovaries 08/29/2005     Priority: Medium     Diagnosed in [place], had facial hair, overweight, carb craving, records being requested, never on metformin          Past Medical/Surgical History:  Past Medical History:   Diagnosis Date     ASCUS favor benign 8/2015    Neg HPV     H/O colposcopy with cervical biopsy 9/14/15    Bx & ECC - negative     LSIL on Pap smear 7/2014    Neg high risk HPV     Multiple sclerosis (H) 8/29/2005 9/18/200pt dx with MS 2004--dx by neurolgist and is followed by Dr. Steven Ayala 10/2016     UNSPEC CONSTIPATION 9/18/2006 9/18/2006   Has tried otc suppository and otc lax.      Past Surgical History:   Procedure Laterality Date     CHOLECYSTECTOMY, LAPOROSCOPIC      Cholecystectomy, Laparoscopic     OPEN REDUCTION INTERNAL FIXATION TOE(S)  4/13/2012    Procedure:OPEN REDUCTION INTERNAL FIXATION TOE(S); Open reduction internal fixation right proximal fifth metatarsal fracture-   Anes-choice block; Surgeon:LEY, JEFFREY DUANE; Location:[place] OR       Social History:  Social History     Socioeconomic History     Marital status: Single     Spouse name: Not on file     Number of children: Not on file     Years of education: Not on file     Highest education level: Not on file   Occupational History     Employer: [employer]   Social Needs     Financial resource strain: Not hard at all     Food insecurity     Worry: Never true     Inability: Never true     Transportation needs     Medical: No     Non-medical: No   Tobacco Use     Smoking status: Never Smoker     Smokeless tobacco: Never Used   Substance and Sexual Activity     Alcohol use: Yes     Comment: social     Drug use: No     Sexual activity: Not Currently     Partners: Male     Birth control/protection: Pill   Lifestyle     Physical activity     Days per week: 0 days     Minutes per session: 0 min     Stress: Only a little   Relationships     Social connections     Talks on phone:  Once a week     Gets together: Never     Attends Scientologist service: Never     Active member of club or organization: No     Attends meetings of clubs or organizations: Never     Relationship status: Never      Intimate partner violence     Fear of current or ex partner: No     Emotionally abused: No     Physically abused: No     Forced sexual activity: No   Other Topics Concern     Parent/sibling w/ CABG, MI or angioplasty before 65F 55M? No   Social History Narrative    , 3rd-5th grade       Family History:  Family History   Problem Relation Age of Onset     Neurologic Disorder Father         MS     Heart Disease Father         irregular heart rate     Diabetes Father      Melanoma Father      Sleep Apnea Father      Cancer Maternal Grandfather         lung     Cancer Paternal Grandmother         stomach     Cancer Mother      Gynecology Maternal Aunt         PCOS       Review of Systems:  A complete review of systems reviewed by me is negative with the exeption of what has been mentioned in the history of present illness.  CONSTITUTIONAL:  POSITIVE for  weight gain  EYES: NEGATIVE for changes in vision, blind spots, double vision.  ENT: NEGATIVE for ear pain, sore throat, sinus pain, post-nasal drip, runny nose, bloody nose  CARDIAC: NEGATIVE for fast heartbeats or fluttering in chest, chest pain or pressure, breathlessness when lying flat, swollen legs or swollen feet.  NEUROLOGIC: NEGATIVE headaches, weakness or numbness in the arms or legs.  DERMATOLOGIC: NEGATIVE for rashes, new moles or change in mole(s)  PULMONARY: NEGATIVE SOB at rest, SOB with activity, dry cough, productive cough, coughing up blood, wheezing or whistling when breathing.    GASTROINTESTINAL: NEGATIVE for nausea or vomitting, loose or watery stools, fat or grease in stools, constipation, abdominal pain, bowel movements black in color or blood noted.  GENITOURINARY: NEGATIVE for pain during urination, blood in  "urine, urinating more frequently than usual, irregular menstrual periods.  MUSCULOSKELETAL: NEGATIVE for muscle pain, bone or joint pain, swollen joints.  ENDOCRINE: NEGATIVE for increased thirst or urination, diabetes.  LYMPHATIC: NEGATIVE for swollen lymph nodes, lumps or bumps in the breasts or nipple discharge.    Physical Examination:  Vitals: Ht 1.6 m (5' 3\")   Wt (!) 181.4 kg (400 lb)   BMI 70.86 kg/m    BMI= Body mass index is 70.86 kg/m .         [place] Total Score 5/20/2021   Total score - [place] 8            GENERAL APPEARANCE: alert and no distress  EYES: Eyes grossly normal to inspection  RESP: breathing is not labored.   NEURO: mentation intact and speech normal  PSYCH: affect normal/bright    Last Comprehensive Metabolic Panel:  Sodium   Date Value Ref Range Status   03/07/2021 139 133 - 144 mmol/L Final     Potassium   Date Value Ref Range Status   03/07/2021 4.3 3.4 - 5.3 mmol/L Final     Chloride   Date Value Ref Range Status   03/07/2021 105 94 - 109 mmol/L Final     Carbon Dioxide   Date Value Ref Range Status   03/07/2021 30 20 - 32 mmol/L Final     Anion Gap   Date Value Ref Range Status   03/07/2021 4 3 - 14 mmol/L Final     Glucose   Date Value Ref Range Status   03/07/2021 99 70 - 99 mg/dL Final     Urea Nitrogen   Date Value Ref Range Status   03/07/2021 12 7 - 30 mg/dL Final     Creatinine   Date Value Ref Range Status   03/07/2021 0.62 0.52 - 1.04 mg/dL Final     GFR Estimate   Date Value Ref Range Status   03/07/2021 >90 >60 mL/min/[1.73_m2] Final     Comment:     Non  GFR Calc  Starting 12/18/2018, serum creatinine based estimated GFR (eGFR) will be   calculated using the Chronic Kidney Disease Epidemiology Collaboration   (CKD-EPI) equation.       Calcium   Date Value Ref Range Status   03/07/2021 9.4 8.5 - 10.1 mg/dL Final       Impression/Plan:  History of severe obstructive sleep apnea with sleep related hypoxemia and hypoventilation, currently not treated with " interim weight gain of 80 lbs.    We reviewed options in light of significant weight gain.   She appears motivated to take care of her problems better and has found a mask that will work for her.   Orders placed for a new mask. Change to auto-CPAP 10-16  Watchpat one on CPAP to follow up on hypoxemia and hypoventilation.    Old polysomnography reviewed.  Obstructive sleep apnea reviewed.  Complications of untreated sleep apnea were reviewed.    Follow up in 8 weeks.    Verito Husain PA-C    CC: Mikala Luna

## 2021-05-25 NOTE — PATIENT INSTRUCTIONS
Your BMI is Body mass index is 70.86 kg/m .  Weight management is a personal decision.  If you are interested in exploring weight loss strategies, the following discussion covers the approaches that may be successful. Body mass index (BMI) is one way to tell whether you are at a healthy weight, overweight, or obese. It measures your weight in relation to your height.  A BMI of 18.5 to 24.9 is in the healthy range. A person with a BMI of 25 to 29.9 is considered overweight, and someone with a BMI of 30 or greater is considered obese. More than two-thirds of American adults are considered overweight or obese.  Being overweight or obese increases the risk for further weight gain. Excess weight may lead to heart disease and diabetes.  Creating and following plans for healthy eating and physical activity may help you improve your health.  Weight control is part of healthy lifestyle and includes exercise, emotional health, and healthy eating habits. Careful eating habits lifelong are the mainstay of weight control. Though there are significant health benefits from weight loss, long-term weight loss with diet alone may be very difficult to achieve- studies show long-term success with dietary management in less than 10% of people. Attaining a healthy weight may be especially difficult to achieve in those with severe obesity. In some cases, medications, devices and surgical management might be considered.  What can you do?  If you are overweight or obese and are interested in methods for weight loss, you should discuss this with your provider.     Consider reducing daily calorie intake by 500 calories.     Keep a food journal.     Avoiding skipping meals, consider cutting portions instead.    Diet combined with exercise helps maintain muscle while optimizing fat loss. Strength training is particularly important for building and maintaining muscle mass. Exercise helps reduce stress, increase energy, and improves fitness.  Increasing exercise without diet control, however, may not burn enough calories to loose weight.       Start walking three days a week 10-20 minutes at a time    Work towards walking thirty minutes five days a week     Eventually, increase the speed of your walking for 1-2 minutes at time    In addition, we recommend that you review healthy lifestyles and methods for weight loss available through the National Institutes of Health patient information sites:  http://win.niddk.nih.gov/publications/index.htm    And look into health and wellness programs that may be available through your health insurance provider, employer, local community center, or glenny club.    Weight management plan: Discussed healthy diet and exercise guidelines

## 2021-05-26 ENCOUNTER — VIRTUAL VISIT (OUTPATIENT)
Dept: PSYCHOLOGY | Facility: CLINIC | Age: 46
End: 2021-05-26
Payer: COMMERCIAL

## 2021-05-26 DIAGNOSIS — F41.1 GENERALIZED ANXIETY DISORDER: Primary | ICD-10-CM

## 2021-05-26 DIAGNOSIS — F33.1 MAJOR DEPRESSIVE DISORDER, RECURRENT EPISODE, MODERATE (H): ICD-10-CM

## 2021-05-26 PROCEDURE — 90834 PSYTX W PT 45 MINUTES: CPT | Mod: GT | Performed by: SOCIAL WORKER

## 2021-05-26 NOTE — PROGRESS NOTES
PAP settings have been changed remotely today. I have notified patient of this via Socialbakershart message.     Luisa MOONEY CMA, [facility], 5/26/2021 8:43 AM

## 2021-05-28 NOTE — PROGRESS NOTES
Progress Note    Patient Name: Bess Enamorado  Date: 5/26/2021       Service Type: Individual      Session Start Time: 1:30 PM  Session End Time: 2:20 PM     Session Length: 50 Minutes    Session #: 33    Attendees: Client      Mode of Communication:  Video Conference via OnSwipe    Telemedicine Visit: The client's condition can be safely assessed and treated via synchronous audio and visual telemedicine encounter.      Reason for Telemedicine Visit: The client was only offered a telehealth visit.    Originating Site (Client Location): Client's Home    Distant Site (Provider Location): Provider Remote Setting    Consent:  The client has verbally consented to the potential risks and benefits of telemedicine (video visit) versus in person care. She agreed that her insurance would be billed. She also agreed to make self-payment for services provided, if needed, and she agreed to take responsibility for payment of non-covered services.      Treatment Plan Last Reviewed: 5/26/2021  PHQ-9 / TALIB-7 : N/A    DATA  Interactive Complexity: No  Crisis: No       Progress Since Last Session (Related to Symptoms / Goals / Homework):   Symptoms: Improving : The client reported experiencing continued decreased symptoms of anxiety and improved mood since the previous video visit.    Homework: N/A       Episode of Care Goals: Satisfactory progress - ACTION (Actively working towards change); Intervened by reinforcing change plan / affirming steps taken.     Current / Ongoing Stressors and Concerns:   The client reported that her stress has decreased.      Treatment Objective(s) Addressed in This Session:   The client will learn and apply emotion regulation skills.      Intervention:   Talked about the client's continued efforts to reach her physical therapy and occupational therapy goals. Examined the client's recent stressors.        ASSESSMENT: Current Emotional / Mental Status (status of  significant symptoms):   Risk status (Self / Other harm or suicidal ideation)   Client denies current fears or concerns for personal safety.   Client denies current or recent suicidal ideation or behaviors.   Client denies current or recent homicidal ideation or behaviors.   Client denies current or recent self injurious behavior or ideation.   Client denies other safety concerns.   Client reports there has been no change in risk factors since her last session.    Client reports there has been a change in protective factors since her last session. She states that a PCA will be starting to work with her.    Recommended that the client call 911 or go to the local emergency department should there be a change in any of these risk factors.     Appearance:   Appropriate    Eye Contact:   Good    Psychomotor Behavior: Normal    Attitude:   Cooperative    Orientation:   All   Speech    Rate / Production: Normal/ Responsive    Volume:  Normal    Mood:    Normal   Affect:    Appropriate    Thought Content:  Clear    Thought Form:  Coherent  Logical    Insight:    Good      Medication Review:   No changes to current psychiatric medication(s).     Medication Compliance:   Yes     Changes in Health Issues:   None reported.     Chemical Use Review:   Substance Use: Chemical use reviewed. No active concerns identified      Tobacco Use: No current tobacco use.      Diagnosis:  1. Generalized anxiety disorder    2. Major depressive disorder, recurrent episode, moderate (H)      Collateral Reports Completed:   Not Applicable    PLAN: (Patient Tasks / Therapist Tasks / Other)  N/A        I have reviewed the note as documented above. This accurately captures the substance of my conversation with the client.  Becky Mendenhall, M.S.W., L.I.C.S.W.                                                         ______________________________________________________________________      Treatment Plan     Patient's Name: Bess Enamorado                         YOB: 1975     Date: 5/26/2021     DSM5 Diagnoses:      296.32 (F33.1) Major Depressive Disorder, Recurrent Episode, Moderate  300.02 (F41.1) Generalized Anxiety Disorder     Psychosocial / Contextual Factors: The client is a [age] year old  single female who resides in a home in [place], [place]. The client is currently on disability due to multiple physical health issues. In addition, she struggles to manage symptoms of major depressive disorder and generalized anxiety disorder.     WHODAS: Not Completed     Referral / Collaboration:  Referral to another professional/service is not indicated at this time.     Anticipated number of session or this episode of care: 12     MeasurableTreatment Goal(s) related to diagnosis / functional impairment(s)  Goal 1: The client will learn and apply skills to manage the symptoms of mental illness she has been experiencing.    I will know I've met my goal when I have less anxiety, more energy and more motivation.       Objective #A    The client will learn and apply mindfulness skills.  Status: Continued - Date(s): 5/26/2021      Intervention(s)  Therapist will teach and  the client in learning and applying mindfulness skills.     Objective #B  The client will learn and apply distress tolerance skills.  Status: Continued - Date(s): 5/26/2021      Intervention(s)  Therapist will teach and  the client in learning and applying distress tolerance skills.     Objective #C  The client will learn and apply emotion regulation skills.  Status: Continued - Date(s): 5/26/2021      Intervention(s)  Therapist will teach and  the client in learning and applying emotion regulation skills.     Objective #D  The client will learn and apply interpersonal effectiveness skills.                    Status: Continued - Date(s): 5/26/2021      Intervention(s)  Therapist will teach and  the client in learning and applying interpersonal effectiveness  skills.        The client has verbally agreed to the above plan.        JENNIFER SantamariaS.ONEYDA., L.I.C.S.W.              May 28, 2021

## 2021-06-01 ENCOUNTER — MYC MEDICAL ADVICE (OUTPATIENT)
Dept: FAMILY MEDICINE | Facility: CLINIC | Age: 46
End: 2021-06-01

## 2021-06-03 ENCOUNTER — IMMUNIZATION (OUTPATIENT)
Dept: FAMILY MEDICINE | Facility: CLINIC | Age: 46
End: 2021-06-03
Attending: FAMILY MEDICINE
Payer: COMMERCIAL

## 2021-06-03 PROCEDURE — 91301 PR COVID VAC MODERNA 100 MCG/0.5 ML IM: CPT

## 2021-06-03 PROCEDURE — 0012A PR COVID VAC MODERNA 100 MCG/0.5 ML IM: CPT

## 2021-06-17 ENCOUNTER — VIRTUAL VISIT (OUTPATIENT)
Dept: PSYCHOLOGY | Facility: CLINIC | Age: 46
End: 2021-06-17
Payer: COMMERCIAL

## 2021-06-17 DIAGNOSIS — F41.1 GENERALIZED ANXIETY DISORDER: Primary | ICD-10-CM

## 2021-06-17 DIAGNOSIS — F33.1 MAJOR DEPRESSIVE DISORDER, RECURRENT EPISODE, MODERATE (H): ICD-10-CM

## 2021-06-17 PROCEDURE — 90834 PSYTX W PT 45 MINUTES: CPT | Mod: GT | Performed by: SOCIAL WORKER

## 2021-06-18 ENCOUNTER — TRANSFERRED RECORDS (OUTPATIENT)
Dept: HEALTH INFORMATION MANAGEMENT | Facility: CLINIC | Age: 46
End: 2021-06-18

## 2021-06-18 NOTE — PROGRESS NOTES
Progress Note    Patient Name: Bess Enamorado  Date: 6/17/2021       Service Type: Individual      Session Start Time: 10:30 AM  Session End Time: 11:20 AM     Session Length: 50 Minutes    Session #: 34    Attendees: Client      Mode of Communication:  Video Conference via [employer]    Telemedicine Visit: The client's condition can be safely assessed and treated via synchronous audio and visual telemedicine encounter.      Reason for Telemedicine Visit: The client was only offered a telehealth visit.    Originating Site (Client Location): Client's Home    Distant Site (Provider Location): Provider Remote Setting    Consent:  The client has verbally consented to the potential risks and benefits of telemedicine (video visit) versus in person care. She agreed that her insurance would be billed. She also agreed to make self-payment for services provided, if needed, and she agreed to take responsibility for payment of non-covered services.      Treatment Plan Last Reviewed: 5/26/2021  PHQ-9 / TALIB-7 : N/A    DATA  Interactive Complexity: No  Crisis: No       Progress Since Last Session (Related to Symptoms / Goals / Homework):   Symptoms: No change : The client reported no change in her symptoms since the previous video visit.    Homework: N/A       Episode of Care Goals: No improvement - ACTION (Actively working towards change); Intervened by reinforcing change plan / affirming steps taken.     Current / Ongoing Stressors and Concerns:   The client reported that her stress remains low overall.      Treatment Objective(s) Addressed in This Session:   The client will learn and apply emotion regulation skills. The client will learn and apply distress tolerance skills.      Intervention:   Examined the client's recent stressors. Discussed and reinforced the client's continued efforts to reach her physical therapy and occupational therapy goals. Talked about her plan to return to  sleeping in her bed. Obtained a commitment from the client to use a relaxation skill as needed if she struggles to fall asleep in her bed.       ASSESSMENT: Current Emotional / Mental Status (status of significant symptoms):   Risk status (Self / Other harm or suicidal ideation)   Client denies current fears or concerns for personal safety.   Client denies current or recent suicidal ideation or behaviors.   Client denies current or recent homicidal ideation or behaviors.   Client denies current or recent self injurious behavior or ideation.   Client denies other safety concerns.   Client reports there has been no change in risk factors since her last session.    Client reports there has been no change in protective factors since her last session.     Recommended that the client call 911 or go to the local emergency department should there be a change in any of these risk factors.     Appearance:   Appropriate    Eye Contact:   Good    Psychomotor Behavior: Normal    Attitude:   Cooperative    Orientation:   All   Speech    Rate / Production: Normal/ Responsive    Volume:  Normal    Mood:    Normal   Affect:    Appropriate    Thought Content:  Clear    Thought Form:  Coherent  Logical    Insight:    Good      Medication Review:   No changes to current psychiatric medication(s).     Medication Compliance:   Yes     Changes in Health Issues:   None reported.     Chemical Use Review:   Substance Use: Chemical use reviewed. No active concerns identified      Tobacco Use: No current tobacco use.      Diagnosis:  1. Generalized anxiety disorder    2. Major depressive disorder, recurrent episode, moderate (H)      Collateral Reports Completed:   Not Applicable    PLAN: (Patient Tasks / Therapist Tasks / Other)  Use a relaxation skill as needed if she struggles to fall asleep in her bed.        I have reviewed the note as documented above. This accurately captures the substance of my conversation with the client.  Becky RODRIGUEZ  CHARLES Mendenhall., BOB.                                                         ______________________________________________________________________      Treatment Plan     Patient's Name: Bess Enamorado                        YOB: 1975     Date: 5/26/2021     DSM5 Diagnoses:      296.32 (F33.1) Major Depressive Disorder, Recurrent Episode, Moderate  300.02 (F41.1) Generalized Anxiety Disorder     Psychosocial / Contextual Factors: The client is a [age] year old  single female who resides in a home in [place], [place]. The client is currently on disability due to multiple physical health issues. In addition, she struggles to manage symptoms of major depressive disorder and generalized anxiety disorder.     WHODAS: Not Completed     Referral / Collaboration:  Referral to another professional/service is not indicated at this time.     Anticipated number of session or this episode of care: 12     MeasurableTreatment Goal(s) related to diagnosis / functional impairment(s)  Goal 1: The client will learn and apply skills to manage the symptoms of mental illness she has been experiencing.    I will know I've met my goal when I have less anxiety, more energy and more motivation.       Objective #A    The client will learn and apply mindfulness skills.  Status: Continued - Date(s): 5/26/2021      Intervention(s)  Therapist will teach and  the client in learning and applying mindfulness skills.     Objective #B  The client will learn and apply distress tolerance skills.  Status: Continued - Date(s): 5/26/2021      Intervention(s)  Therapist will teach and  the client in learning and applying distress tolerance skills.     Objective #C  The client will learn and apply emotion regulation skills.  Status: Continued - Date(s): 5/26/2021      Intervention(s)  Therapist will teach and  the client in learning and applying emotion regulation skills.     Objective #D  The client will learn  and apply interpersonal effectiveness skills.                    Status: Continued - Date(s): 5/26/2021      Intervention(s)  Therapist will teach and  the client in learning and applying interpersonal effectiveness skills.        The client has verbally agreed to the above plan.        Becky Mendenhall M.S.ONEYDA., L.BERTA.C.S.W.              May 28, 2021

## 2021-06-30 ENCOUNTER — VIRTUAL VISIT (OUTPATIENT)
Dept: PSYCHOLOGY | Facility: CLINIC | Age: 46
End: 2021-06-30
Payer: COMMERCIAL

## 2021-06-30 DIAGNOSIS — F41.1 GENERALIZED ANXIETY DISORDER: Primary | ICD-10-CM

## 2021-06-30 DIAGNOSIS — F33.1 MAJOR DEPRESSIVE DISORDER, RECURRENT EPISODE, MODERATE (H): ICD-10-CM

## 2021-06-30 PROCEDURE — 90834 PSYTX W PT 45 MINUTES: CPT | Mod: GT | Performed by: SOCIAL WORKER

## 2021-07-02 NOTE — PROGRESS NOTES
Progress Note    Patient Name: Bess Enamorado  Date: 6/30/2021       Service Type: Individual      Session Start Time: 1:30 PM  Session End Time: 2:20 PM     Session Length: 50 Minutes    Session #: 35    Attendees: Client      Mode of Communication:  Video Conference via [employer]    Telemedicine Visit: The client's condition can be safely assessed and treated via synchronous audio and visual telemedicine encounter.      Reason for Telemedicine Visit: The client was only offered a telehealth visit.    Originating Site (Client Location): Client's Home    Distant Site (Provider Location): Provider Remote Setting    Consent:  The client has verbally consented to the potential risks and benefits of telemedicine (video visit) versus in person care. She agreed that her insurance would be billed. She also agreed to make self-payment for services provided, if needed, and she agreed to take responsibility for payment of non-covered services.      Treatment Plan Last Reviewed: 5/26/2021  PHQ-9 / TALIB-7 : N/A    DATA  Interactive Complexity: No  Crisis: No       Progress Since Last Session (Related to Symptoms / Goals / Homework):   Symptoms: Improving : The client reported experiencing slightly decreased symptoms of anxiety since returning from her weekend out of town.    Homework: Did not complete. The client reported that she did not use a relaxation skill to help her fall asleep in her bed.       Episode of Care Goals: Minimal progress - ACTION (Actively working towards change); Intervened by reinforcing change plan / affirming steps taken.     Current / Ongoing Stressors and Concerns:   The client reported that her stress remains low overall.      Treatment Objective(s) Addressed in This Session:   The client will learn and apply emotion regulation skills.       Intervention:   Talked at length about the client's weekend out of town for her sister's wedding. Discussed and  reinforced her efforts to build mastery. Obtained a commitment from the client to continue to attempt to build mastery by doing difficult things.       ASSESSMENT: Current Emotional / Mental Status (status of significant symptoms):   Risk status (Self / Other harm or suicidal ideation)   Client denies current fears or concerns for personal safety.   Client denies current or recent suicidal ideation or behaviors.   Client denies current or recent homicidal ideation or behaviors.   Client denies current or recent self injurious behavior or ideation.   Client denies other safety concerns.   Client reports there has been no change in risk factors since her last session.    Client reports there has been a change in protective factors since her last session. She states that she spent the weekend out of town for her sister's wedding. She indicates that she was able to socialize with family members which she enjoyed.   Recommended that the client call 911 or go to the local emergency department should there be a change in any of these risk factors.     Appearance:   Appropriate    Eye Contact:   Good    Psychomotor Behavior: Normal    Attitude:   Cooperative    Orientation:   All   Speech    Rate / Production: Normal/ Responsive    Volume:  Normal    Mood:    Normal   Affect:    Appropriate    Thought Content:  Clear    Thought Form:  Coherent  Logical    Insight:    Good      Medication Review:   No changes to current psychiatric medication(s).     Medication Compliance:   Yes     Changes in Health Issues:   None reported.     Chemical Use Review:   Substance Use: Chemical use reviewed. No active concerns identified      Tobacco Use: No current tobacco use.      Diagnosis:  1. Generalized anxiety disorder    2. Major depressive disorder, recurrent episode, moderate (H)      Collateral Reports Completed:   Not Applicable    PLAN: (Patient Tasks / Therapist Tasks / Other)  Continue to attempt to build mastery by doing  difficult things.        I have reviewed the note as documented above. This accurately captures the substance of my conversation with the client.  TALHA Santamaria.S.ONEYDA., BOB.                                                         ______________________________________________________________________      Treatment Plan     Patient's Name: Bess Enamorado                        YOB: 1975     Date: 5/26/2021     DSM5 Diagnoses:      296.32 (F33.1) Major Depressive Disorder, Recurrent Episode, Moderate  300.02 (F41.1) Generalized Anxiety Disorder     Psychosocial / Contextual Factors: The client is a [age] year old  single female who resides in a home in [place], [place]. The client is currently on disability due to multiple physical health issues. In addition, she struggles to manage symptoms of major depressive disorder and generalized anxiety disorder.     WHODAS: Not Completed     Referral / Collaboration:  Referral to another professional/service is not indicated at this time.     Anticipated number of session or this episode of care: 12     MeasurableTreatment Goal(s) related to diagnosis / functional impairment(s)  Goal 1: The client will learn and apply skills to manage the symptoms of mental illness she has been experiencing.    I will know I've met my goal when I have less anxiety, more energy and more motivation.       Objective #A    The client will learn and apply mindfulness skills.  Status: Continued - Date(s): 5/26/2021      Intervention(s)  Therapist will teach and  the client in learning and applying mindfulness skills.     Objective #B  The client will learn and apply distress tolerance skills.  Status: Continued - Date(s): 5/26/2021      Intervention(s)  Therapist will teach and  the client in learning and applying distress tolerance skills.     Objective #C  The client will learn and apply emotion regulation skills.  Status: Continued - Date(s): 5/26/2021       Intervention(s)  Therapist will teach and  the client in learning and applying emotion regulation skills.     Objective #D  The client will learn and apply interpersonal effectiveness skills.                    Status: Continued - Date(s): 5/26/2021      Intervention(s)  Therapist will teach and  the client in learning and applying interpersonal effectiveness skills.        The client has verbally agreed to the above plan.        TALHA Santamaria.S.ONEYDA., L.I.C.S.W.              May 28, 2021

## 2021-07-14 ENCOUNTER — VIRTUAL VISIT (OUTPATIENT)
Dept: PSYCHOLOGY | Facility: CLINIC | Age: 46
End: 2021-07-14
Payer: COMMERCIAL

## 2021-07-14 DIAGNOSIS — F33.1 MAJOR DEPRESSIVE DISORDER, RECURRENT EPISODE, MODERATE (H): ICD-10-CM

## 2021-07-14 DIAGNOSIS — F41.1 GENERALIZED ANXIETY DISORDER: Primary | ICD-10-CM

## 2021-07-14 PROCEDURE — 90834 PSYTX W PT 45 MINUTES: CPT | Mod: GT | Performed by: SOCIAL WORKER

## 2021-07-17 NOTE — PROGRESS NOTES
Progress Note    Patient Name: Bess Enamorado  Date: 7/14/2021       Service Type: Individual      Session Start Time: 2:30 PM  Session End Time: 3:20 PM     Session Length: 50 Minutes    Session #: 36    Attendees: Client      Mode of Communication:  Video Conference via [employer]    Telemedicine Visit: The client's condition can be safely assessed and treated via synchronous audio and visual telemedicine encounter.      Reason for Telemedicine Visit: The client was only offered a telehealth visit.    Originating Site (Client Location): Client's Home    Distant Site (Provider Location): Provider Remote Setting    Consent:  The client has verbally consented to the potential risks and benefits of telemedicine (video visit) versus in person care. She agreed that her insurance would be billed. She also agreed to make self-payment for services provided, if needed, and she agreed to take responsibility for payment of non-covered services.      Treatment Plan Last Reviewed: 5/26/2021  PHQ-9 / TALIB-7 : N/A    DATA  Interactive Complexity: No  Crisis: No       Progress Since Last Session (Related to Symptoms / Goals / Homework):   Symptoms: No change : The client reported no change in her symptoms since the previous video visit.    Homework: Did not complete. The client reported that she has continued to attempt to build mastery by doing difficult things.      Episode of Care Goals: No improvement - ACTION (Actively working towards change); Intervened by reinforcing change plan / affirming steps taken.     Current / Ongoing Stressors and Concerns:   The client reported that her stress remains low overall.      Treatment Objective(s) Addressed in This Session:   The client will learn and apply emotion regulation skills.       Intervention:   Talked about the client's recent stressors. Discussed and reinforced her efforts to build mastery. Discussed the client's PCA's upcoming  resignation. Obtained a commitment from the client to make a list of other possible PCA's to fill the position.       ASSESSMENT: Current Emotional / Mental Status (status of significant symptoms):   Risk status (Self / Other harm or suicidal ideation)   Client denies current fears or concerns for personal safety.   Client denies current or recent suicidal ideation or behaviors.   Client denies current or recent homicidal ideation or behaviors.   Client denies current or recent self injurious behavior or ideation.   Client denies other safety concerns.   Client reports there has been no change in risk factors since her last session.    Client reports there has been no change in protective factors since her last session.    Recommended that the client call 911 or go to the local emergency department should there be a change in any of these risk factors.     Appearance:   Appropriate    Eye Contact:   Good    Psychomotor Behavior: Normal    Attitude:   Cooperative    Orientation:   All   Speech    Rate / Production: Normal/ Responsive    Volume:  Normal    Mood:    Normal   Affect:    Appropriate    Thought Content:  Clear    Thought Form:  Coherent  Logical    Insight:    Good      Medication Review:   No changes to current psychiatric medication(s).     Medication Compliance:   Yes     Changes in Health Issues:   None reported.     Chemical Use Review:   Substance Use: Chemical use reviewed. No active concerns identified      Tobacco Use: No current tobacco use.      Diagnosis:  1. Generalized anxiety disorder    2. Major depressive disorder, recurrent episode, moderate (H)      Collateral Reports Completed:   Not Applicable    PLAN: (Patient Tasks / Therapist Tasks / Other)  Make a list of other possible PCA's to fill the position.        I have reviewed the note as documented above. This accurately captures the substance of my conversation with the client.  Becky Mendenhall, M.S.W., L.I.C.S.W.                                                          ______________________________________________________________________      Treatment Plan     Patient's Name: Bess Enamorado                        YOB: 1975     Date: 5/26/2021     DSM5 Diagnoses:      296.32 (F33.1) Major Depressive Disorder, Recurrent Episode, Moderate  300.02 (F41.1) Generalized Anxiety Disorder     Psychosocial / Contextual Factors: The client is a [age] year old  single female who resides in a home in [place], [place]. The client is currently on disability due to multiple physical health issues. In addition, she struggles to manage symptoms of major depressive disorder and generalized anxiety disorder.     WHODAS: Not Completed     Referral / Collaboration:  Referral to another professional/service is not indicated at this time.     Anticipated number of session or this episode of care: 12     MeasurableTreatment Goal(s) related to diagnosis / functional impairment(s)  Goal 1: The client will learn and apply skills to manage the symptoms of mental illness she has been experiencing.    I will know I've met my goal when I have less anxiety, more energy and more motivation.       Objective #A    The client will learn and apply mindfulness skills.  Status: Continued - Date(s): 5/26/2021      Intervention(s)  Therapist will teach and  the client in learning and applying mindfulness skills.     Objective #B  The client will learn and apply distress tolerance skills.  Status: Continued - Date(s): 5/26/2021      Intervention(s)  Therapist will teach and  the client in learning and applying distress tolerance skills.     Objective #C  The client will learn and apply emotion regulation skills.  Status: Continued - Date(s): 5/26/2021      Intervention(s)  Therapist will teach and  the client in learning and applying emotion regulation skills.     Objective #D  The client will learn and apply interpersonal effectiveness skills.                     Status: Continued - Date(s): 5/26/2021      Intervention(s)  Therapist will teach and  the client in learning and applying interpersonal effectiveness skills.        The client has verbally agreed to the above plan.        TALHA Santamaria.S.ONEYDA., L.I.C.S.W.              May 28, 2021

## 2021-07-28 ENCOUNTER — VIRTUAL VISIT (OUTPATIENT)
Dept: PSYCHOLOGY | Facility: CLINIC | Age: 46
End: 2021-07-28
Payer: COMMERCIAL

## 2021-07-28 DIAGNOSIS — F41.1 GENERALIZED ANXIETY DISORDER: Primary | ICD-10-CM

## 2021-07-28 DIAGNOSIS — F33.1 MAJOR DEPRESSIVE DISORDER, RECURRENT EPISODE, MODERATE (H): ICD-10-CM

## 2021-07-28 PROCEDURE — 90834 PSYTX W PT 45 MINUTES: CPT | Mod: GT | Performed by: SOCIAL WORKER

## 2021-07-29 ENCOUNTER — MYC MEDICAL ADVICE (OUTPATIENT)
Dept: FAMILY MEDICINE | Facility: CLINIC | Age: 46
End: 2021-07-29

## 2021-07-29 NOTE — TELEPHONE ENCOUNTER
Dr. Luna please see request for referral to pain clinic.  Or is ortho more appropriate for her?    Adry Bennett RN

## 2021-07-31 NOTE — PROGRESS NOTES
Progress Note    Patient Name: Bess Enamorado  Date: 7/28/2021       Service Type: Individual      Session Start Time: 2:30 PM  Session End Time: 3:20 PM     Session Length: 50 Minutes    Session #: 37    Attendees: Client      Mode of Communication:  Video Conference via [employer]    Telemedicine Visit: The client's condition can be safely assessed and treated via synchronous audio and visual telemedicine encounter.      Reason for Telemedicine Visit: The client was only offered a telehealth visit.    Originating Site (Client Location): Client's Home    Distant Site (Provider Location): Provider Remote Setting    Consent:  The client has verbally consented to the potential risks and benefits of telemedicine (video visit) versus in person care. She agreed that her insurance would be billed. She also agreed to make self-payment for services provided, if needed, and she agreed to take responsibility for payment of non-covered services.      Treatment Plan Last Reviewed: 5/26/2021  PHQ-9 / TALIB-7 : N/A    DATA  Interactive Complexity: No  Crisis: No       Progress Since Last Session (Related to Symptoms / Goals / Homework):   Symptoms: No change : The client reported no change in her symptoms since the previous video visit.    Homework: Achieved / completed to satisfaction. 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      Episode of Care Goals: No improvement - ACTION (Actively working towards change); Intervened by reinforcing change plan / affirming steps taken.     Current / Ongoing Stressors and Concerns:   The client reported that her stress remains low overall.      Treatment Objective(s) Addressed in This Session:   The client will learn and apply mindfulness skills.       Intervention:   Talked about the client's recent stressors. Discussed the client's increased hip pain. Restarted examining the  client's eating behaviors. Obtained a commitment from the client to determine how to apply accountability to her eating behaviors.       ASSESSMENT: Current Emotional / Mental Status (status of significant symptoms):   Risk status (Self / Other harm or suicidal ideation)   Client denies current fears or concerns for personal safety.   Client denies current or recent suicidal ideation or behaviors.   Client denies current or recent homicidal ideation or behaviors.   Client denies current or recent self injurious behavior or ideation.   Client denies other safety concerns.   Client reports there has been no change in risk factors since her last session.    Client reports there has been no change in protective factors since her last session.    Recommended that the client call 911 or go to the local emergency department should there be a change in any of these risk factors.     Appearance:   Appropriate    Eye Contact:   Good    Psychomotor Behavior: Normal    Attitude:   Cooperative    Orientation:   All   Speech    Rate / Production: Normal/ Responsive    Volume:  Normal    Mood:    Normal   Affect:    Appropriate    Thought Content:  Clear    Thought Form:  Coherent  Logical    Insight:    Good      Medication Review:   No changes to current psychiatric medication(s).     Medication Compliance:   Yes     Changes in Health Issues:   None reported.     Chemical Use Review:   Substance Use: Chemical use reviewed. No active concerns identified      Tobacco Use: No current tobacco use.      Diagnosis:  1. Generalized anxiety disorder    2. Major depressive disorder, recurrent episode, moderate (H)      Collateral Reports Completed:   Not Applicable    PLAN: (Patient Tasks / Therapist Tasks / Other)  Determine how to apply accountability to her eating behaviors.        I have reviewed the note as documented above. This accurately captures the substance of my conversation with the client.  Becky Mendenhall M.S.W.,  JANETTE                                                         ______________________________________________________________________      Treatment Plan     Patient's Name: Bess Enamorado                        YOB: 1975     Date: 5/26/2021     DSM5 Diagnoses:      296.32 (F33.1) Major Depressive Disorder, Recurrent Episode, Moderate  300.02 (F41.1) Generalized Anxiety Disorder     Psychosocial / Contextual Factors: The client is a [age] year old  single female who resides in a home in [place], [place]. The client is currently on disability due to multiple physical health issues. In addition, she struggles to manage symptoms of major depressive disorder and generalized anxiety disorder.     WHODAS: Not Completed     Referral / Collaboration:  Referral to another professional/service is not indicated at this time.     Anticipated number of session or this episode of care: 12     MeasurableTreatment Goal(s) related to diagnosis / functional impairment(s)  Goal 1: The client will learn and apply skills to manage the symptoms of mental illness she has been experiencing.    I will know I've met my goal when I have less anxiety, more energy and more motivation.       Objective #A    The client will learn and apply mindfulness skills.  Status: Continued - Date(s): 5/26/2021      Intervention(s)  Therapist will teach and  the client in learning and applying mindfulness skills.     Objective #B  The client will learn and apply distress tolerance skills.  Status: Continued - Date(s): 5/26/2021      Intervention(s)  Therapist will teach and  the client in learning and applying distress tolerance skills.     Objective #C  The client will learn and apply emotion regulation skills.  Status: Continued - Date(s): 5/26/2021      Intervention(s)  Therapist will teach and  the client in learning and applying emotion regulation skills.     Objective #D  The client will learn and apply  interpersonal effectiveness skills.                    Status: Continued - Date(s): 5/26/2021      Intervention(s)  Therapist will teach and  the client in learning and applying interpersonal effectiveness skills.        The client has verbally agreed to the above plan.        TALHA Santamaria.[email or web address]., L.BERTA.C.S.W.              May 28, 2021

## 2021-08-04 ENCOUNTER — VIRTUAL VISIT (OUTPATIENT)
Dept: PSYCHOLOGY | Facility: CLINIC | Age: 46
End: 2021-08-04
Payer: COMMERCIAL

## 2021-08-04 DIAGNOSIS — F41.1 GENERALIZED ANXIETY DISORDER: Primary | ICD-10-CM

## 2021-08-04 DIAGNOSIS — F33.1 MAJOR DEPRESSIVE DISORDER, RECURRENT EPISODE, MODERATE (H): ICD-10-CM

## 2021-08-04 PROCEDURE — 90834 PSYTX W PT 45 MINUTES: CPT | Mod: GT | Performed by: SOCIAL WORKER

## 2021-08-07 NOTE — PROGRESS NOTES
Progress Note    Patient Name: Bess Enamorado  Date: 8/4/2021       Service Type: Individual      Session Start Time: 8:30 AM  Session End Time: 9:20 AM     Session Length: 50 Minutes    Session #: 38    Attendees: Client      Mode of Communication:  Video Conference via [employer]    Telemedicine Visit: The client's condition can be safely assessed and treated via synchronous audio and visual telemedicine encounter.      Reason for Telemedicine Visit: The client was only offered a telehealth visit.    Originating Site (Client Location): Client's Home    Distant Site (Provider Location): Provider Remote Setting    Consent:  The client has verbally consented to the potential risks and benefits of telemedicine (video visit) versus in person care. She agreed that her insurance would be billed. She also agreed to make self-payment for services provided, if needed, and she agreed to take responsibility for payment of non-covered services.      Treatment Plan Last Reviewed: 5/26/2021  PHQ-9 / TALIB-7 : N/A    DATA  Interactive Complexity: No  Crisis: No       Progress Since Last Session (Related to Symptoms / Goals / Homework):   Symptoms: No change : The client reported no change in her symptoms since the previous video visit.    Homework: Achieved / completed to satisfaction. The client reported that she did determine how to apply accountability to her eating behaviors.      Episode of Care Goals: No improvement - ACTION (Actively working towards change); Intervened by reinforcing change plan / affirming steps taken.     Current / Ongoing Stressors and Concerns:   The client reported that her stress remains low overall.      Treatment Objective(s) Addressed in This Session:   The client will learn and apply distress tolerance skills.       Intervention:   Talked about the client's recent stressors. Continued examining the client's desire to change her eating behaviors. Obtained a  commitment from the client to identify the pros and cons of asking a friend for help to change her eating behaviors and the pros and cons of not asking the friend for help to change her eating behaviors. Suggested an additional workbook resource.       ASSESSMENT: Current Emotional / Mental Status (status of significant symptoms):   Risk status (Self / Other harm or suicidal ideation)   Client denies current fears or concerns for personal safety.   Client denies current or recent suicidal ideation or behaviors.   Client denies current or recent homicidal ideation or behaviors.   Client denies current or recent self injurious behavior or ideation.   Client denies other safety concerns.   Client reports there has been no change in risk factors since her last session.    Client reports there has been no change in protective factors since her last session.    Recommended that the client call 911 or go to the local emergency department should there be a change in any of these risk factors.     Appearance:   Appropriate    Eye Contact:   Good    Psychomotor Behavior: Normal    Attitude:   Cooperative    Orientation:   All   Speech    Rate / Production: Normal/ Responsive    Volume:  Normal    Mood:    Normal   Affect:    Appropriate    Thought Content:  Clear    Thought Form:  Coherent  Logical    Insight:    Good      Medication Review:   No changes to current psychiatric medication(s).     Medication Compliance:   Yes     Changes in Health Issues:   None reported.     Chemical Use Review:   Substance Use: Chemical use reviewed. No active concerns identified      Tobacco Use: No current tobacco use.      Diagnosis:  1. Generalized anxiety disorder    2. Major depressive disorder, recurrent episode, moderate (H)      Collateral Reports Completed:   Not Applicable    PLAN: (Patient Tasks / Therapist Tasks / Other)  Identify the pros and cons of asking a friend for help to change her eating behaviors and the pros and cons  of not asking the friend for help to change her eating behaviors.        I have reviewed the note as documented above. This accurately captures the substance of my conversation with the client.  Becky Mendenhall M.S.ONEYDA., BOB.                                                         ______________________________________________________________________      Treatment Plan     Patient's Name: Bess Enamorado                        YOB: 1975     Date: 5/26/2021     DSM5 Diagnoses:      296.32 (F33.1) Major Depressive Disorder, Recurrent Episode, Moderate  300.02 (F41.1) Generalized Anxiety Disorder     Psychosocial / Contextual Factors: The client is a [age] year old  single female who resides in a home in [place], [place]. The client is currently on disability due to multiple physical health issues. In addition, she struggles to manage symptoms of major depressive disorder and generalized anxiety disorder.     WHODAS: Not Completed     Referral / Collaboration:  Referral to another professional/service is not indicated at this time.     Anticipated number of session or this episode of care: 12     MeasurableTreatment Goal(s) related to diagnosis / functional impairment(s)  Goal 1: The client will learn and apply skills to manage the symptoms of mental illness she has been experiencing.    I will know I've met my goal when I have less anxiety, more energy and more motivation.       Objective #A    The client will learn and apply mindfulness skills.  Status: Continued - Date(s): 5/26/2021      Intervention(s)  Therapist will teach and  the client in learning and applying mindfulness skills.     Objective #B  The client will learn and apply distress tolerance skills.  Status: Continued - Date(s): 5/26/2021      Intervention(s)  Therapist will teach and  the client in learning and applying distress tolerance skills.     Objective #C  The client will learn and apply emotion regulation  skills.  Status: Continued - Date(s): 5/26/2021      Intervention(s)  Therapist will teach and  the client in learning and applying emotion regulation skills.     Objective #D  The client will learn and apply interpersonal effectiveness skills.                    Status: Continued - Date(s): 5/26/2021      Intervention(s)  Therapist will teach and  the client in learning and applying interpersonal effectiveness skills.        The client has verbally agreed to the above plan.        Becky Mendenhall M.S.W., L.I.C.S.W.              May 28, 2021

## 2021-09-08 ENCOUNTER — TRANSFERRED RECORDS (OUTPATIENT)
Dept: HEALTH INFORMATION MANAGEMENT | Facility: CLINIC | Age: 46
End: 2021-09-08

## 2021-09-09 ENCOUNTER — VIRTUAL VISIT (OUTPATIENT)
Dept: PSYCHOLOGY | Facility: CLINIC | Age: 46
End: 2021-09-09
Payer: COMMERCIAL

## 2021-09-09 DIAGNOSIS — F33.1 MAJOR DEPRESSIVE DISORDER, RECURRENT EPISODE, MODERATE (H): ICD-10-CM

## 2021-09-09 DIAGNOSIS — F41.1 GENERALIZED ANXIETY DISORDER: Primary | ICD-10-CM

## 2021-09-09 PROCEDURE — 90834 PSYTX W PT 45 MINUTES: CPT | Mod: GT | Performed by: SOCIAL WORKER

## 2021-09-11 NOTE — PROGRESS NOTES
Progress Note    Patient Name: Bess Enamorado  Date: 9/9/2021       Service Type: Individual      Session Start Time: 11:30 AM  Session End Time: 12:20 PM     Session Length: 50 Minutes    Session #: 39    Attendees: Client      Mode of Communication:  Video Conference via [employer]    Telemedicine Visit: The client's condition can be safely assessed and treated via synchronous audio and visual telemedicine encounter.      Reason for Telemedicine Visit: The client was only offered a telehealth visit.    Originating Site (Client Location): Client's Home    Distant Site (Provider Location): Provider Remote Setting    Consent:  The client has verbally consented to the potential risks and benefits of telemedicine (video visit) versus in person care. She agreed that her insurance would be billed. She also agreed to make self-payment for services provided, if needed, and she agreed to take responsibility for payment of non-covered services.      Treatment Plan Last Reviewed: 9/9/2021  PHQ-9 / TALIB-7 : N/A    DATA  Interactive Complexity: No  Crisis: No       Progress Since Last Session (Related to Symptoms / Goals / Homework):   Symptoms: Worsening : The client reported experiencing increased feelings of isolation.    Homework: Achieved / completed to satisfaction. The client reported that she did identify the pros and cons of asking a friend for help to change her eating behaviors and the pros and cons of not asking the friend for help to change her eating behaviors.      Episode of Care Goals: No improvement - ACTION (Actively working towards change); Intervened by reinforcing change plan / affirming steps taken.     Current / Ongoing Stressors and Concerns:   The client reported that her stress remains low overall.      Treatment Objective(s) Addressed in This Session:   The client will learn and apply interpersonal effectiveness skills.       Intervention:   Talked about the  client's recent stressors. Continued examining the client's desire to change her eating behaviors. Obtained a commitment from the client to obtain the opinion of a friend about whether or not she should ask another friend to provide support and guidance in changing her eating behaviors.       ASSESSMENT: Current Emotional / Mental Status (status of significant symptoms):   Risk status (Self / Other harm or suicidal ideation)   Client denies current fears or concerns for personal safety.   Client denies current or recent suicidal ideation or behaviors.   Client denies current or recent homicidal ideation or behaviors.   Client denies current or recent self injurious behavior or ideation.   Client denies other safety concerns.   Client reports there has been no change in risk factors since her last session.    Client reports there has been no change in protective factors since her last session.    Recommended that the client call 911 or go to the local emergency department should there be a change in any of these risk factors.     Appearance:   Appropriate    Eye Contact:   Good    Psychomotor Behavior: Normal    Attitude:   Cooperative    Orientation:   All   Speech    Rate / Production: Normal/ Responsive    Volume:  Normal    Mood:    Normal   Affect:    Appropriate    Thought Content:  Clear    Thought Form:  Coherent  Logical    Insight:    Good      Medication Review:   No changes to current psychiatric medication(s).     Medication Compliance:   Yes     Changes in Health Issues:   None reported.     Chemical Use Review:   Substance Use: Chemical use reviewed. No active concerns identified      Tobacco Use: No current tobacco use.      Diagnosis:  1. Generalized anxiety disorder    2. Major depressive disorder, recurrent episode, moderate (H)      Collateral Reports Completed:   Not Applicable    PLAN: (Patient Tasks / Therapist Tasks / Other)  Obtain the opinion of a friend about whether or not she should ask  another friend to provide support and guidance in changing her eating behaviors.        I have reviewed the note as documented above. This accurately captures the substance of my conversation with the client.  Becky Mendenhall M.S.ONEYDA., BOB.                                                         ______________________________________________________________________      Treatment Plan     Patient's Name: Bess Enamorado                        YOB: 1975     Date: 9/9/2021     DSM5 Diagnoses:      296.32 (F33.1) Major Depressive Disorder, Recurrent Episode, Moderate  300.02 (F41.1) Generalized Anxiety Disorder     Psychosocial / Contextual Factors: The client is a [age] year old  single female who resides in a home in [place], [place]. The client is currently on disability due to multiple physical health issues. In addition, she struggles to manage symptoms of major depressive disorder and generalized anxiety disorder.     WHODAS: Not Completed     Referral / Collaboration:  Referral to another professional/service is not indicated at this time.     Anticipated number of session or this episode of care: 12     MeasurableTreatment Goal(s) related to diagnosis / functional impairment(s)  Goal 1: The client will learn and apply skills to manage the symptoms of mental illness she has been experiencing.    I will know I've met my goal when I have less anxiety, more energy and more motivation.       Objective #A    The client will learn and apply mindfulness skills.  Status: Continued - Date(s): 9/9/2021      Intervention(s)  Therapist will teach and  the client in learning and applying mindfulness skills.     Objective #B  The client will learn and apply distress tolerance skills.  Status: Continued - Date(s): 9/9/2021      Intervention(s)  Therapist will teach and  the client in learning and applying distress tolerance skills.     Objective #C  The client will learn and apply  emotion regulation skills.  Status: Continued - Date(s): 9/9/2021      Intervention(s)  Therapist will teach and  the client in learning and applying emotion regulation skills.     Objective #D  The client will learn and apply interpersonal effectiveness skills.                    Status: Continued - Date(s): 9/9/2021      Intervention(s)  Therapist will teach and  the client in learning and applying interpersonal effectiveness skills.        The client has verbally agreed to the above plan.        Becky Mendenhall, M.S.W., L.I.C.S.W.              September 10, 2021

## 2021-09-18 ENCOUNTER — HEALTH MAINTENANCE LETTER (OUTPATIENT)
Age: 46
End: 2021-09-18

## 2021-09-23 ENCOUNTER — VIRTUAL VISIT (OUTPATIENT)
Dept: PSYCHOLOGY | Facility: CLINIC | Age: 46
End: 2021-09-23
Payer: COMMERCIAL

## 2021-09-23 DIAGNOSIS — F33.1 MAJOR DEPRESSIVE DISORDER, RECURRENT EPISODE, MODERATE (H): ICD-10-CM

## 2021-09-23 DIAGNOSIS — F41.1 GENERALIZED ANXIETY DISORDER: Primary | ICD-10-CM

## 2021-09-23 PROCEDURE — 90834 PSYTX W PT 45 MINUTES: CPT | Mod: GT | Performed by: SOCIAL WORKER

## 2021-09-24 NOTE — PROGRESS NOTES
Progress Note    Patient Name: Bess Enamorado  Date: 9/23/2021       Service Type: Individual      Session Start Time: 11:30 AM  Session End Time: 12:20 PM     Session Length: 50 Minutes    Session #: 40    Attendees: Client      Mode of Communication:  Video Conference via [employer]    Telemedicine Visit: The client's condition can be safely assessed and treated via synchronous audio and visual telemedicine encounter.      Reason for Telemedicine Visit: The client was only offered a telehealth visit.    Originating Site (Client Location): Client's Home    Distant Site (Provider Location): Provider Remote Setting    Consent:  The client has verbally consented to the potential risks and benefits of telemedicine (video visit) versus in person care. She agreed that her insurance would be billed. She also agreed to make self-payment for services provided, if needed, and she agreed to take responsibility for payment of non-covered services.      Treatment Plan Last Reviewed: 9/9/2021  PHQ-9 / TALIB-7 : N/A    DATA  Interactive Complexity: No  Crisis: No       Progress Since Last Session (Related to Symptoms / Goals / Homework):   Symptoms: No change : The client reported no change in her symptoms since the previous video visit.    Homework: Partially completed. The client reported that she did not obtain the opinion of a friend about whether or not she should ask another friend to provide support and guidance in changing her eating behaviors. She stated that she did obtain and is reading the Appetite Awareness Workbook. She indicated that she has been making minor changes to her routine in an effort to get more exercise.       Episode of Care Goals: No improvement - ACTION (Actively working towards change); Intervened by reinforcing change plan / affirming steps taken.     Current / Ongoing Stressors and Concerns:   The client reported that her stress remains low overall.  CBC WITH DIFFERENTIAL  Dx: H20.9 Uveitis    SEDIMENTATION RATE WESTERGREN  Dx: H20.9 Uveitis    COMPREHENSIVE METABOLIC PANEL  Dx: H20.9 Uveitis    C REACTIVE PROTEIN  Dx: H20.9 Uveitis    CBC WITH AUTOMATED DIFFERENTIAL (PERFORMABLE ONLY)  Dx: H20.9 Uveitis  1 gold  1 Lav   Specimens given by Infusion          Treatment Objective(s) Addressed in This Session:   The client will learn and apply emotion regulation skills.       Intervention:   Talked about the client's recent stressors. Continued examining the client's desire to change her eating behaviors. Discussed and reinforced the client's efforts to read the Appetite Awareness Workbook and to make minor changes to her routine in an an effort to get more exercise. Obtained a commitment from the client to continue to read the Appetite Awareness Workbook.       ASSESSMENT: Current Emotional / Mental Status (status of significant symptoms):   Risk status (Self / Other harm or suicidal ideation)   Client denies current fears or concerns for personal safety.   Client denies current or recent suicidal ideation or behaviors.   Client denies current or recent homicidal ideation or behaviors.   Client denies current or recent self injurious behavior or ideation.   Client denies other safety concerns.   Client reports there has been no change in risk factors since her last session.    Client reports there has been no change in protective factors since her last session.    Recommended that the client call 911 or go to the local emergency department should there be a change in any of these risk factors.     Appearance:   Appropriate    Eye Contact:   Good    Psychomotor Behavior: Normal    Attitude:   Cooperative    Orientation:   All   Speech    Rate / Production: Normal/ Responsive    Volume:  Normal    Mood:    Normal   Affect:    Appropriate    Thought Content:  Clear    Thought Form:  Coherent  Logical    Insight:    Good      Medication Review:   No changes to current psychiatric medication(s).     Medication Compliance:   Yes     Changes in Health Issues:   None reported.     Chemical Use Review:   Substance Use: Chemical use reviewed. No active concerns identified      Tobacco Use: No current tobacco use.      Diagnosis:  1. Generalized anxiety disorder    2. Major  depressive disorder, recurrent episode, moderate (H)      Collateral Reports Completed:   Not Applicable    PLAN: (Patient Tasks / Therapist Tasks / Other)  Continue to read the Appetite Awareness Workbook.        I have reviewed the note as documented above. This accurately captures the substance of my conversation with the client.  CHARLES Santamaria., BOB.                                                         ______________________________________________________________________      Treatment Plan     Patient's Name: Bess Enamorado                        YOB: 1975     Date: 9/9/2021     DSM5 Diagnoses:      296.32 (F33.1) Major Depressive Disorder, Recurrent Episode, Moderate  300.02 (F41.1) Generalized Anxiety Disorder     Psychosocial / Contextual Factors: The client is a [age] year old  single female who resides in a home in [place], [place]. The client is currently on disability due to multiple physical health issues. In addition, she struggles to manage symptoms of major depressive disorder and generalized anxiety disorder.     WHODAS: Not Completed     Referral / Collaboration:  Referral to another professional/service is not indicated at this time.     Anticipated number of session or this episode of care: 12     MeasurableTreatment Goal(s) related to diagnosis / functional impairment(s)  Goal 1: The client will learn and apply skills to manage the symptoms of mental illness she has been experiencing.    I will know I've met my goal when I have less anxiety, more energy and more motivation.       Objective #A    The client will learn and apply mindfulness skills.  Status: Continued - Date(s): 9/9/2021      Intervention(s)  Therapist will teach and  the client in learning and applying mindfulness skills.     Objective #B  The client will learn and apply distress tolerance skills.  Status: Continued - Date(s): 9/9/2021      Intervention(s)  Therapist will teach and   the client in learning and applying distress tolerance skills.     Objective #C  The client will learn and apply emotion regulation skills.  Status: Continued - Date(s): 9/9/2021      Intervention(s)  Therapist will teach and  the client in learning and applying emotion regulation skills.     Objective #D  The client will learn and apply interpersonal effectiveness skills.                    Status: Continued - Date(s): 9/9/2021      Intervention(s)  Therapist will teach and  the client in learning and applying interpersonal effectiveness skills.        The client has verbally agreed to the above plan.        Becky Mendenhall, M.S.W., L.I.C.S.W.              September 10, 2021

## 2021-10-07 ENCOUNTER — VIRTUAL VISIT (OUTPATIENT)
Dept: PSYCHOLOGY | Facility: CLINIC | Age: 46
End: 2021-10-07
Payer: COMMERCIAL

## 2021-10-07 DIAGNOSIS — F41.1 GENERALIZED ANXIETY DISORDER: Primary | ICD-10-CM

## 2021-10-07 DIAGNOSIS — F33.1 MAJOR DEPRESSIVE DISORDER, RECURRENT EPISODE, MODERATE (H): ICD-10-CM

## 2021-10-07 PROCEDURE — 90834 PSYTX W PT 45 MINUTES: CPT | Mod: GT | Performed by: SOCIAL WORKER

## 2021-10-09 NOTE — PROGRESS NOTES
Progress Note    Patient Name: Bess Enamorado  Date: 10/7/2021       Service Type: Individual      Session Start Time: 10:30 AM  Session End Time: 11:20 AM     Session Length: 50 Minutes    Session #: 41    Attendees: Client      Mode of Communication:  Video Conference via [employer]    Telemedicine Visit: The client's condition can be safely assessed and treated via synchronous audio and visual telemedicine encounter.      Reason for Telemedicine Visit: The client was only offered a telehealth visit.    Originating Site (Client Location): Client's Home    Distant Site (Provider Location): Provider Remote Setting    Consent:  The client has verbally consented to the potential risks and benefits of telemedicine (video visit) versus in person care. She agreed that her insurance would be billed. She also agreed to make self-payment for services provided, if needed, and she agreed to take responsibility for payment of non-covered services.      Treatment Plan Last Reviewed: 9/9/2021  PHQ-9 / TALIB-7 : N/A    DATA  Interactive Complexity: No  Crisis: No       Progress Since Last Session (Related to Symptoms / Goals / Homework):   Symptoms: No change : The client reported no change in her symptoms since the previous video visit.    Homework: Did not complete. The client reported that she did not continue to read the Appetite Awareness Workbook.      Episode of Care Goals: No improvement - CONTEMPLATION (Considering change and yet undecided); Intervened by assessing the negative and positive thinking (ambivalence) about behavior change.     Current / Ongoing Stressors and Concerns:   The client reported that her stress remains low overall.      Treatment Objective(s) Addressed in This Session:   The client will learn and apply emotion regulation skills.       Intervention:   Talked about the client's recent stressors. Discussed the client's difficulty attending her physical therapy  appointments. Problem solved the barriers to attending the next appointment. Obtained a commitment from the client to ask for support from her friend to ensure she attends the appointment. Examined the client's ambivalence about changing her behaviors in an effort to loose weight.       ASSESSMENT: Current Emotional / Mental Status (status of significant symptoms):   Risk status (Self / Other harm or suicidal ideation)   Client denies current fears or concerns for personal safety.   Client denies current or recent suicidal ideation or behaviors.   Client denies current or recent homicidal ideation or behaviors.   Client denies current or recent self injurious behavior or ideation.   Client denies other safety concerns.   Client reports there has been no change in risk factors since her last session.    Client reports there has been no change in protective factors since her last session.    Recommended that the client call 911 or go to the local emergency department should there be a change in any of these risk factors.     Appearance:   Appropriate    Eye Contact:   Good    Psychomotor Behavior: Normal    Attitude:   Cooperative    Orientation:   All   Speech    Rate / Production: Normal/ Responsive    Volume:  Normal    Mood:    Normal   Affect:    Appropriate    Thought Content:  Clear    Thought Form:  Coherent  Logical    Insight:    Good      Medication Review:   No changes to current psychiatric medication(s).     Medication Compliance:   Yes     Changes in Health Issues:   None reported.     Chemical Use Review:   Substance Use: Chemical use reviewed. No active concerns identified      Tobacco Use: No current tobacco use.      Diagnosis:  1. Generalized anxiety disorder    2. Major depressive disorder, recurrent episode, moderate (H)      Collateral Reports Completed:   Not Applicable    PLAN: (Patient Tasks / Therapist Tasks / Other)  Ask for support from her friend to ensure she attends the next physical  therapy appointment.        I have reviewed the note as documented above. This accurately captures the substance of my conversation with the client.  TALHA Santamaria.[email or web address]., BOB.                                                         ______________________________________________________________________      Treatment Plan     Patient's Name: Bess Enamorado                        YOB: 1975     Date: 9/9/2021     DSM5 Diagnoses:      296.32 (F33.1) Major Depressive Disorder, Recurrent Episode, Moderate  300.02 (F41.1) Generalized Anxiety Disorder     Psychosocial / Contextual Factors: The client is a [age] year old  single female who resides in a home in [place], [place]. The client is currently on disability due to multiple physical health issues. In addition, she struggles to manage symptoms of major depressive disorder and generalized anxiety disorder.     WHODAS: Not Completed     Referral / Collaboration:  Referral to another professional/service is not indicated at this time.     Anticipated number of session or this episode of care: 12     MeasurableTreatment Goal(s) related to diagnosis / functional impairment(s)  Goal 1: The client will learn and apply skills to manage the symptoms of mental illness she has been experiencing.    I will know I've met my goal when I have less anxiety, more energy and more motivation.       Objective #A    The client will learn and apply mindfulness skills.  Status: Continued - Date(s): 9/9/2021      Intervention(s)  Therapist will teach and  the client in learning and applying mindfulness skills.     Objective #B  The client will learn and apply distress tolerance skills.  Status: Continued - Date(s): 9/9/2021      Intervention(s)  Therapist will teach and  the client in learning and applying distress tolerance skills.     Objective #C  The client will learn and apply emotion regulation skills.  Status: Continued - Date(s): 9/9/2021       Intervention(s)  Therapist will teach and  the client in learning and applying emotion regulation skills.     Objective #D  The client will learn and apply interpersonal effectiveness skills.                    Status: Continued - Date(s): 9/9/2021      Intervention(s)  Therapist will teach and  the client in learning and applying interpersonal effectiveness skills.        The client has verbally agreed to the above plan.        TALHA Santamaria.[email or web address]., L.I.C.S.W.              September 10, 2021

## 2021-10-27 ENCOUNTER — VIRTUAL VISIT (OUTPATIENT)
Dept: PSYCHOLOGY | Facility: CLINIC | Age: 46
End: 2021-10-27
Payer: COMMERCIAL

## 2021-10-27 DIAGNOSIS — F33.1 MAJOR DEPRESSIVE DISORDER, RECURRENT EPISODE, MODERATE (H): ICD-10-CM

## 2021-10-27 DIAGNOSIS — F41.1 GENERALIZED ANXIETY DISORDER: Primary | ICD-10-CM

## 2021-10-27 PROCEDURE — 90834 PSYTX W PT 45 MINUTES: CPT | Mod: GT | Performed by: SOCIAL WORKER

## 2021-10-28 NOTE — PROGRESS NOTES
Progress Note    Patient Name: Bess Enamorado  Date: 10/27/2021       Service Type: Individual      Session Start Time: 10:30 AM  Session End Time: 11:20 AM     Session Length: 50 Minutes    Session #: 42    Attendees: Client      Mode of Communication:  Video Conference via [employer]    Telemedicine Visit: The client's condition can be safely assessed and treated via synchronous audio and visual telemedicine encounter.      Reason for Telemedicine Visit: The client was only offered a telehealth visit.    Originating Site (Client Location): Client's Home    Distant Site (Provider Location): Provider Remote Setting    Consent:  The client has verbally consented to the potential risks and benefits of telemedicine (video visit) versus in person care. She agreed that her insurance would be billed. She also agreed to make self-payment for services provided, if needed, and she agreed to take responsibility for payment of non-covered services.      Treatment Plan Last Reviewed: 9/9/2021  PHQ-9 / TALIB-7 : N/A    DATA  Interactive Complexity: No  Crisis: No       Progress Since Last Session (Related to Symptoms / Goals / Homework):   Symptoms: Improving : The client reported experiencing increased motivation.    Homework: Achieved / completed to satisfaction. The client reported that she did ask for support from her friend to ensure she attended the physical therapy appointment.      Episode of Care Goals: Minimal progress - ACTION (Actively working towards change); Intervened by reinforcing change plan / affirming steps taken.     Current / Ongoing Stressors and Concerns:   The client reported that her stress has increased.      Treatment Objective(s) Addressed in This Session:   The client will learn and apply emotion regulation skills.       Intervention:   Talked about the client's recent stressors including her concerns about her father's health. Discussed and reinforced the  client's efforts to attend her physical therapy appointment. Reviewed the usefulness of bilateral stimulation. Obtained a commitment from the client to use bilateral stimulation on YouTube upon arriving at her physical therapy appointment.       ASSESSMENT: Current Emotional / Mental Status (status of significant symptoms):   Risk status (Self / Other harm or suicidal ideation)   Client denies current fears or concerns for personal safety.   Client denies current or recent suicidal ideation or behaviors.   Client denies current or recent homicidal ideation or behaviors.   Client denies current or recent self injurious behavior or ideation.   Client denies other safety concerns.   Client reports there has been no change in risk factors since her last session.    Client reports there has been no change in protective factors since her last session.    Recommended that the client call 911 or go to the local emergency department should there be a change in any of these risk factors.     Appearance:   Appropriate    Eye Contact:   Good    Psychomotor Behavior: Normal    Attitude:   Cooperative    Orientation:   All   Speech    Rate / Production: Normal/ Responsive    Volume:  Normal    Mood:    Normal   Affect:    Appropriate    Thought Content:  Clear    Thought Form:  Coherent  Logical    Insight:    Good      Medication Review:   No changes to current psychiatric medication(s).     Medication Compliance:   Yes     Changes in Health Issues:   None reported.     Chemical Use Review:   Substance Use: Chemical use reviewed. No active concerns identified      Tobacco Use: No current tobacco use.      Diagnosis:  1. Generalized anxiety disorder    2. Major depressive disorder, recurrent episode, moderate (H)      Collateral Reports Completed:   Not Applicable    PLAN: (Patient Tasks / Therapist Tasks / Other)  Use bilateral stimulation on YouTube upon arriving at her physical therapy appointment.        I have reviewed the  note as documented above. This accurately captures the substance of my conversation with the client.  CHARLES Santamaria., BOB.                                                         ______________________________________________________________________      Treatment Plan     Patient's Name: Bess Enamorado                        YOB: 1975     Date: 9/9/2021     DSM5 Diagnoses:      296.32 (F33.1) Major Depressive Disorder, Recurrent Episode, Moderate  300.02 (F41.1) Generalized Anxiety Disorder     Psychosocial / Contextual Factors: The client is a [age] year old  single female who resides in a home in [place], [place]. The client is currently on disability due to multiple physical health issues. In addition, she struggles to manage symptoms of major depressive disorder and generalized anxiety disorder.     WHODAS: Not Completed     Referral / Collaboration:  Referral to another professional/service is not indicated at this time.     Anticipated number of session or this episode of care: 12     MeasurableTreatment Goal(s) related to diagnosis / functional impairment(s)  Goal 1: The client will learn and apply skills to manage the symptoms of mental illness she has been experiencing.    I will know I've met my goal when I have less anxiety, more energy and more motivation.       Objective #A    The client will learn and apply mindfulness skills.  Status: Continued - Date(s): 9/9/2021      Intervention(s)  Therapist will teach and  the client in learning and applying mindfulness skills.     Objective #B  The client will learn and apply distress tolerance skills.  Status: Continued - Date(s): 9/9/2021      Intervention(s)  Therapist will teach and  the client in learning and applying distress tolerance skills.     Objective #C  The client will learn and apply emotion regulation skills.  Status: Continued - Date(s): 9/9/2021      Intervention(s)  Therapist will teach and   the client in learning and applying emotion regulation skills.     Objective #D  The client will learn and apply interpersonal effectiveness skills.                    Status: Continued - Date(s): 9/9/2021      Intervention(s)  Therapist will teach and  the client in learning and applying interpersonal effectiveness skills.        The client has verbally agreed to the above plan.        TALHA Santamaria.[email or web address]., LYOLANDAC.[email or web address].              September 10, 2021

## 2021-11-10 ENCOUNTER — VIRTUAL VISIT (OUTPATIENT)
Dept: PSYCHOLOGY | Facility: CLINIC | Age: 46
End: 2021-11-10
Payer: COMMERCIAL

## 2021-11-10 DIAGNOSIS — F33.1 MAJOR DEPRESSIVE DISORDER, RECURRENT EPISODE, MODERATE (H): ICD-10-CM

## 2021-11-10 DIAGNOSIS — F41.1 GENERALIZED ANXIETY DISORDER: Primary | ICD-10-CM

## 2021-11-10 PROCEDURE — 90834 PSYTX W PT 45 MINUTES: CPT | Mod: GT | Performed by: SOCIAL WORKER

## 2021-11-11 NOTE — PROGRESS NOTES
Progress Note    Patient Name: Bess Enamorado  Date: 11/10/2021       Service Type: Individual      Session Start Time: 10:30 AM  Session End Time: 11:20 AM     Session Length: 50 Minutes    Session #: 43    Attendees: Client      Mode of Communication:  Video Conference via [employer]    Telemedicine Visit: The client's condition can be safely assessed and treated via synchronous audio and visual telemedicine encounter.      Reason for Telemedicine Visit: The client was only offered a telehealth visit.    Originating Site (Client Location): Client's Home    Distant Site (Provider Location): Provider Remote Setting    Consent:  The client has verbally consented to the potential risks and benefits of telemedicine (video visit) versus in person care. She agreed that her insurance would be billed. She also agreed to make self-payment for services provided, if needed, and she agreed to take responsibility for payment of non-covered services.      Treatment Plan Last Reviewed: 9/9/2021  PHQ-9 / TALIB-7 : N/A    DATA  Interactive Complexity: No  Crisis: No       Progress Since Last Session (Related to Symptoms / Goals / Homework):   Symptoms: No change : The client reported no change in her symptoms since the previous video visit.    Homework: Did not complete. The client reported that she did not use bilateral stimulation on YouTube upon arriving at her physical therapy appointment.      Episode of Care Goals: No improvement - PREPARATION (Decided to change - considering how); Intervened by negotiating a change plan and determining options / strategies for behavior change, identifying triggers, exploring social supports, and working towards setting a date to begin behavior change.     Current / Ongoing Stressors and Concerns:   The client reported that her stress has increased. She stated that her father was hospitalized due to a COVID infection.     Treatment Objective(s)  Addressed in This Session:   The client will learn and apply interpersonal effectiveness skills.       Intervention:   Talked about the client's recent stressors including her ongoing concerns about her father's health. Discussed and reinforced the client's efforts to share her concerns about her father's health with her mother.       ASSESSMENT: Current Emotional / Mental Status (status of significant symptoms):   Risk status (Self / Other harm or suicidal ideation)   Client denies current fears or concerns for personal safety.   Client denies current or recent suicidal ideation or behaviors.   Client denies current or recent homicidal ideation or behaviors.   Client denies current or recent self injurious behavior or ideation.   Client denies other safety concerns.   Client reports there has been no change in risk factors since her last session.    Client reports there has been no change in protective factors since her last session.    Recommended that the client call 911 or go to the local emergency department should there be a change in any of these risk factors.     Appearance:   Appropriate    Eye Contact:   Good    Psychomotor Behavior: Normal    Attitude:   Cooperative    Orientation:   All   Speech    Rate / Production: Normal/ Responsive    Volume:  Normal    Mood:    Normal   Affect:    Appropriate    Thought Content:  Clear    Thought Form:  Coherent  Logical    Insight:    Good      Medication Review:   No changes to current psychiatric medication(s).     Medication Compliance:   Yes     Changes in Health Issues:   None reported.     Chemical Use Review:   Substance Use: Chemical use reviewed. No active concerns identified      Tobacco Use: No current tobacco use.      Diagnosis:  1. Generalized anxiety disorder    2. Major depressive disorder, recurrent episode, moderate (H)      Collateral Reports Completed:   Not Applicable    PLAN: (Patient Tasks / Therapist Tasks / Other)  N/A        I have  reviewed the note as documented above. This accurately captures the substance of my conversation with the client.  CHARLES Santamaria., BOB.                                                         ______________________________________________________________________      Treatment Plan     Patient's Name: Bess Enamorado                        YOB: 1975     Date: 9/9/2021     DSM5 Diagnoses:      296.32 (F33.1) Major Depressive Disorder, Recurrent Episode, Moderate  300.02 (F41.1) Generalized Anxiety Disorder     Psychosocial / Contextual Factors: The client is a [age] year old  single female who resides in a home in [place], [place]. The client is currently on disability due to multiple physical health issues. In addition, she struggles to manage symptoms of major depressive disorder and generalized anxiety disorder.     WHODAS: Not Completed     Referral / Collaboration:  Referral to another professional/service is not indicated at this time.     Anticipated number of session or this episode of care: 12     MeasurableTreatment Goal(s) related to diagnosis / functional impairment(s)  Goal 1: The client will learn and apply skills to manage the symptoms of mental illness she has been experiencing.    I will know I've met my goal when I have less anxiety, more energy and more motivation.       Objective #A    The client will learn and apply mindfulness skills.  Status: Continued - Date(s): 9/9/2021      Intervention(s)  Therapist will teach and  the client in learning and applying mindfulness skills.     Objective #B  The client will learn and apply distress tolerance skills.  Status: Continued - Date(s): 9/9/2021      Intervention(s)  Therapist will teach and  the client in learning and applying distress tolerance skills.     Objective #C  The client will learn and apply emotion regulation skills.  Status: Continued - Date(s): 9/9/2021      Intervention(s)  Therapist  will teach and  the client in learning and applying emotion regulation skills.     Objective #D  The client will learn and apply interpersonal effectiveness skills.                    Status: Continued - Date(s): 9/9/2021      Intervention(s)  Therapist will teach and  the client in learning and applying interpersonal effectiveness skills.        The client has verbally agreed to the above plan.        Becky Mendenhall M.S.ONEYDA., L.BERTA.C.S.W.              September 10, 2021

## 2021-11-24 ENCOUNTER — VIRTUAL VISIT (OUTPATIENT)
Dept: PSYCHOLOGY | Facility: CLINIC | Age: 46
End: 2021-11-24
Payer: COMMERCIAL

## 2021-11-24 DIAGNOSIS — F41.1 GENERALIZED ANXIETY DISORDER: Primary | ICD-10-CM

## 2021-11-24 DIAGNOSIS — F33.1 MAJOR DEPRESSIVE DISORDER, RECURRENT EPISODE, MODERATE (H): ICD-10-CM

## 2021-11-24 PROCEDURE — 90834 PSYTX W PT 45 MINUTES: CPT | Mod: GT | Performed by: SOCIAL WORKER

## 2021-11-24 NOTE — PROGRESS NOTES
Progress Note    Patient Name: Bess Enamorado  Date: 11/24/2021       Service Type: Individual      Session Start Time: 10:30 AM  Session End Time: 11:20 AM     Session Length: 50 Minutes    Session #: 44    Attendees: Client      Mode of Communication:  Video Conference via [employer]    Telemedicine Visit: The client's condition can be safely assessed and treated via synchronous audio and visual telemedicine encounter.      Reason for Telemedicine Visit: The client was only offered a telehealth visit.    Originating Site (Client Location): Client's Home    Distant Site (Provider Location): Provider Remote Setting    Consent:  The client has verbally consented to the potential risks and benefits of telemedicine (video visit) versus in person care. She agreed that her insurance would be billed. She also agreed to make self-payment for services provided, if needed, and she agreed to take responsibility for payment of non-covered services.      Treatment Plan Last Reviewed: 9/9/2021  PHQ-9 / TALIB-7 : N/A    DATA  Interactive Complexity: No  Crisis: No       Progress Since Last Session (Related to Symptoms / Goals / Homework):   Symptoms: No change : The client reported no change in her symptoms since the previous video visit.    Homework: N/A      Episode of Care Goals: No improvement - PREPARATION (Decided to change - considering how); Intervened by negotiating a change plan and determining options / strategies for behavior change, identifying triggers, exploring social supports, and working towards setting a date to begin behavior change.     Current / Ongoing Stressors and Concerns:   The client reported that her stress has decreased.      Treatment Objective(s) Addressed in This Session:   The client will learn and apply emotion regulation skills.       Intervention:   Talked about the client's recent stressors. Examined her decision not to attend physical therapy today.  Obtained a commitment from the client to do her physical therapy exercises at home later in the day.       ASSESSMENT: Current Emotional / Mental Status (status of significant symptoms):   Risk status (Self / Other harm or suicidal ideation)   Client denies current fears or concerns for personal safety.   Client denies current or recent suicidal ideation or behaviors.   Client denies current or recent homicidal ideation or behaviors.   Client denies current or recent self injurious behavior or ideation.   Client denies other safety concerns.   Client reports there has been no change in risk factors since her last session.    Client reports there has been no change in protective factors since her last session.    Recommended that the client call 911 or go to the local emergency department should there be a change in any of these risk factors.     Appearance:   Appropriate    Eye Contact:   Good    Psychomotor Behavior: Normal    Attitude:   Cooperative    Orientation:   All   Speech    Rate / Production: Normal/ Responsive    Volume:  Normal    Mood:    Normal   Affect:    Appropriate    Thought Content:  Clear    Thought Form:  Coherent  Logical    Insight:    Good      Medication Review:   No changes to current psychiatric medication(s).     Medication Compliance:   Yes     Changes in Health Issues:   None reported.     Chemical Use Review:   Substance Use: Chemical use reviewed. No active concerns identified      Tobacco Use: No current tobacco use.      Diagnosis:  1. Generalized anxiety disorder    2. Major depressive disorder, recurrent episode, moderate (H)      Collateral Reports Completed:   Not Applicable    PLAN: (Patient Tasks / Therapist Tasks / Other)  Do her physical therapy exercises at home later in the day.        I have reviewed the note as documented above. This accurately captures the substance of my conversation with the client.  Becky Mendenhall, M.S.W., L.I.C.S.W.                                                          ______________________________________________________________________      Treatment Plan     Patient's Name: Bess Enamorado                        YOB: 1975     Date: 9/9/2021     DSM5 Diagnoses:      296.32 (F33.1) Major Depressive Disorder, Recurrent Episode, Moderate  300.02 (F41.1) Generalized Anxiety Disorder     Psychosocial / Contextual Factors: The client is a [age] year old  single female who resides in a home in [place], [place]. The client is currently on disability due to multiple physical health issues. In addition, she struggles to manage symptoms of major depressive disorder and generalized anxiety disorder.     WHODAS: Not Completed     Referral / Collaboration:  Referral to another professional/service is not indicated at this time.     Anticipated number of session or this episode of care: 12     MeasurableTreatment Goal(s) related to diagnosis / functional impairment(s)  Goal 1: The client will learn and apply skills to manage the symptoms of mental illness she has been experiencing.    I will know I've met my goal when I have less anxiety, more energy and more motivation.       Objective #A    The client will learn and apply mindfulness skills.  Status: Continued - Date(s): 9/9/2021      Intervention(s)  Therapist will teach and  the client in learning and applying mindfulness skills.     Objective #B  The client will learn and apply distress tolerance skills.  Status: Continued - Date(s): 9/9/2021      Intervention(s)  Therapist will teach and  the client in learning and applying distress tolerance skills.     Objective #C  The client will learn and apply emotion regulation skills.  Status: Continued - Date(s): 9/9/2021      Intervention(s)  Therapist will teach and  the client in learning and applying emotion regulation skills.     Objective #D  The client will learn and apply interpersonal effectiveness skills.                     Status: Continued - Date(s): 9/9/2021      Intervention(s)  Therapist will teach and  the client in learning and applying interpersonal effectiveness skills.        The client has verbally agreed to the above plan.        JENNIFER SantamariaS.ONEYDA., L.I.C.S.W.              September 10, 2021

## 2021-12-08 ENCOUNTER — E-VISIT (OUTPATIENT)
Dept: FAMILY MEDICINE | Facility: CLINIC | Age: 46
End: 2021-12-08
Payer: COMMERCIAL

## 2021-12-08 ENCOUNTER — VIRTUAL VISIT (OUTPATIENT)
Dept: PSYCHOLOGY | Facility: CLINIC | Age: 46
End: 2021-12-08
Payer: COMMERCIAL

## 2021-12-08 ENCOUNTER — E-VISIT (OUTPATIENT)
Dept: FAMILY MEDICINE | Facility: CLINIC | Age: 46
End: 2021-12-08

## 2021-12-08 DIAGNOSIS — S81.802A OPEN WOUND OF KNEE, LEG, AND ANKLE, LEFT, INITIAL ENCOUNTER: Primary | ICD-10-CM

## 2021-12-08 DIAGNOSIS — S81.002A OPEN WOUND OF KNEE, LEG, AND ANKLE, LEFT, INITIAL ENCOUNTER: Primary | ICD-10-CM

## 2021-12-08 DIAGNOSIS — S91.002A OPEN WOUND OF KNEE, LEG, AND ANKLE, LEFT, INITIAL ENCOUNTER: Primary | ICD-10-CM

## 2021-12-08 DIAGNOSIS — Z20.822 SUSPECTED COVID-19 VIRUS INFECTION: Primary | ICD-10-CM

## 2021-12-08 DIAGNOSIS — F33.1 MAJOR DEPRESSIVE DISORDER, RECURRENT EPISODE, MODERATE (H): ICD-10-CM

## 2021-12-08 DIAGNOSIS — F41.1 GENERALIZED ANXIETY DISORDER: Primary | ICD-10-CM

## 2021-12-08 PROCEDURE — 90834 PSYTX W PT 45 MINUTES: CPT | Mod: GT | Performed by: SOCIAL WORKER

## 2021-12-08 PROCEDURE — 99421 OL DIG E/M SVC 5-10 MIN: CPT | Performed by: FAMILY MEDICINE

## 2021-12-08 NOTE — PROGRESS NOTES
Discharge Summary  Multiple Sessions    Client Name: Bess Enaomrado MRN#: [id number] YOB: 1975    Discharge Date:   December 8, 2021    Service Modality: Video Visit    Telemedicine Visit: The client's condition can be safely assessed and treated via synchronous audio and visual telemedicine encounter.      Reason for Telemedicine Visit: The client was only offered a telehealth visit.    Originating Site (Client Location): Client's Home    Distant Site (Provider Location): Provider Remote Setting     Consent:  The client has verbally consented to the potential risks and benefits of telemedicine (video visit) versus in person care. She agreed that her insurance would be billed. She also agreed to make self-payment for services provided, if needed, and she agreed to take responsibility for payment of non-covered services.     The client would like the video invitation sent by:  My Chart    Mode of Communication:  Video Conference via [employer]    As the provider, I attest to compliance with applicable laws and regulations related to telemedicine.    Service Type: Individual      Session Start Time: 10:30 AM  Session End Time: 11:15 AM      Session Length: 45 Minutes     Session #: 45     Attendees: Client    Focus of Treatment Objective(s):  The client's presenting concerns included struggling to manage symptoms associated with major depressive disorder and generalized anxiety disorder.  Stage of change at time of discharge: ACTION (Actively working towards change)    Medication Adherence:  Yes    Chemical Use:  No    Assessment: Current Emotional / Mental Status (status of significant symptoms):    Risk status (Self / Other harm or suicidal ideation)  Client denies current fears or concerns for personal safety.  Client denies current or recent suicidal ideation or behaviors.  Client denies current or recent homicidal ideation or behaviors.  Client denies current or recent self injurious  behavior or ideation.  Client denies other safety concerns.     Appearance:   Appropriate   Eye Contact:   Good   Psychomotor Behavior: Normal   Attitude:   Cooperative   Orientation:   All  Speech   Rate / Production: Normal/ Responsive   Volume:  Normal   Mood:    Mildly Anxious  Affect:    Appropriate   Thought Content:  Clear   Thought Form:  Coherent  Logical   Insight:   Good     DSM5 Diagnoses: (Sustained by DSM5 Criteria Listed Above)  Diagnoses: 296.32 (F33.1) Major Depressive Disorder, Recurrent Episode, Moderate   300.02 (F41.1) Generalized Anxiety Disorder  Psychosocial & Contextual Factors: The client is a [age] year old  single female who resides in a home in [place], [place]. The client is currently on disability due to multiple physical health issues.   WHODAS 2.0 (12 item) Score: Not Completed  Reason for Discharge:  This writer has resigned from [employer].    Aftercare Plan:  The client may resume counseling services at any time in the future by calling the [facility] intake office at [phone].      Becky Mendenhall M.S.W., L.I.C.S.W.  December 8, 2021

## 2021-12-09 ENCOUNTER — DOCUMENTATION ONLY (OUTPATIENT)
Dept: ADMINISTRATIVE | Facility: CLINIC | Age: 46
End: 2021-12-09
Payer: COMMERCIAL

## 2021-12-09 NOTE — PATIENT INSTRUCTIONS
Dear Bess Enamorado,    Your symptoms show that you may have coronavirus (COVID-19). This illness can cause fever, cough and trouble breathing. Many people get a mild case and get better on their own. Some people can get very sick.    Will I be tested for COVID-19?  We would like to test you for Covid-19 virus. I have placed orders for this test.     To schedule: go to your [employer] home page and scroll down to the section that says  You have an appointment that needs to be scheduled  and click the large green button that says  Schedule Now  and follow the steps to find the next available openings.    If you are unable to complete these [employer] scheduling steps, please call [phone] to schedule your testing.     Return to work/school/ guidance:  Please let your workplace manager and staffing office know when your quarantine ends     We can t give you an exact date as it depends on the above. You can calculate this on your own or work with your manager/staffing office to calculate this. (For example if you were exposed on 10/4, you would have to quarantine for 14 full days. That would be through 10/18. You could return on 10/19.)      If you receive a positive COVID-19 test result, follow the guidance of the those who are giving you the results. Usually the return to work is 10 (or in some cases 20 days from symptom onset.) If you work at [facility], you must also be cleared by Employee Occupational Health and Safety to return to work.        If you receive a negative COVID-19 test result and did not have a high risk exposure to someone with a known positive COVID-19 test, you can return to work once you're free of fever for 24 hours without fever-reducing medication and your symptoms are improving or resolved.      If you receive a negative COVID-19 test and If you had a high risk exposure to someone who has tested positive for COVID-19 then you can return to work 14 days after your last contact with  the positive individual    Note: If you have ongoing exposure to the covid positive person, this quarantine period may be more than 14 days. (For example, if you are continued to be exposed to your child who tested positive and cannot isolate from them, then the quarantine of 7-14 days can't start until your child is no longer contagious. This is typically 10 days from onset of the child's symptoms. So the total duration may be 17-24 days in this case.)    Sign up for [employer].   We know it's scary to hear that you might have COVID-19. We want to track your symptoms to make sure you're okay over the next 2 weeks. Please look for an email from [employer]--this is a free, online program that we'll use to keep in touch. To sign up, follow the link in the email you will receive. Learn more at [email or web address]    How can I take care of myself?    Get lots of rest. Drink extra fluids (unless a doctor has told you not to)    Take Tylenol (acetaminophen) or ibuprofen for fever or pain. If you have liver or kidney problems, ask your family doctor if it's okay to take Tylenol o ibuprofen    If you have other health problems (like cancer, heart failure, an organ transplant or severe kidney disease): Call your specialty clinic if you don't feel better in the next 2 days.    Know when to call 911. Emergency warning signs include:  o Trouble breathing or shortness of breath  o Pain or pressure in the chest that doesn't go away  o Feeling confused like you haven't felt before, or not being able to wake up  o Bluish-colored lips or face    Where can I get more information?  [facility] [employer] [facility] - About COVID-19:   [email or web address]    CDC - What to Do If You're Sick:   www.cdc.gov/coronavirus/2019-ncov/about/steps-when-sick.html

## 2021-12-09 NOTE — TELEPHONE ENCOUNTER
Provider E-Visit time total (minutes): 5 minutes      Care Team, please fax homecare order to [facility].    Mikala Luna M.D.

## 2021-12-09 NOTE — TELEPHONE ENCOUNTER
Provider E-Visit time total (minutes): 5 minutes      Care team:  Is homecare able to do home testing for COVID on homebound patients?      12/9/2021 8:48 AM - nevermind, did a home test that was positive.  Patient instructed to see if she can get monoclonal ab infusion.    Mikala Luna M.D.

## 2021-12-10 ENCOUNTER — LAB (OUTPATIENT)
Dept: FAMILY MEDICINE | Facility: CLINIC | Age: 46
End: 2021-12-10
Attending: FAMILY MEDICINE
Payer: COMMERCIAL

## 2021-12-10 ENCOUNTER — TELEPHONE (OUTPATIENT)
Dept: FAMILY MEDICINE | Facility: CLINIC | Age: 46
End: 2021-12-10

## 2021-12-10 DIAGNOSIS — Z20.822 SUSPECTED COVID-19 VIRUS INFECTION: ICD-10-CM

## 2021-12-10 PROCEDURE — U0005 INFEC AGEN DETEC AMPLI PROBE: HCPCS

## 2021-12-10 PROCEDURE — U0003 INFECTIOUS AGENT DETECTION BY NUCLEIC ACID (DNA OR RNA); SEVERE ACUTE RESPIRATORY SYNDROME CORONAVIRUS 2 (SARS-COV-2) (CORONAVIRUS DISEASE [COVID-19]), AMPLIFIED PROBE TECHNIQUE, MAKING USE OF HIGH THROUGHPUT TECHNOLOGIES AS DESCRIBED BY CMS-2020-01-R: HCPCS

## 2021-12-10 PROCEDURE — 99207 PR NO CHARGE LOS: CPT

## 2021-12-10 NOTE — TELEPHONE ENCOUNTER
Reason for Call:  Home Health Care    Crystal  with [facility]  Homecare called regarding     Orders are needed for this patient.   Skilled Nursing: can get her set up by the 14th if she can get updated orders by then.      Pt Provider: *edelmira     Did not see any notes pertaining to wound care that was going along with the home care referral    Skilled nursing requested but there is nothing to document this.    Need actual visit pertaining to the wound to go with the order for skilled nursing  Phone Number Homecare Nurse can be reached at: *[phone]  Can we leave a detailed message on this number? YES    Best Time: any    Call taken on 12/10/2021 at 3:47 PM by Cyndie Hercules

## 2021-12-11 LAB — SARS-COV-2 RNA RESP QL NAA+PROBE: POSITIVE

## 2021-12-13 ENCOUNTER — HOME INFUSION (PRE-WILLOW HOME INFUSION) (OUTPATIENT)
Dept: PHARMACY | Facility: CLINIC | Age: 46
End: 2021-12-13
Payer: COMMERCIAL

## 2021-12-13 NOTE — TELEPHONE ENCOUNTER
[facility] she is saying that an E-visit for left ankle wound is not sufficient for them to go out to see patient, she says that a virtual or in person office visit is appropriate.    Called patient, she prefers virtual appointment, will route to PCP to see if can schedule patient on 12-15-21 and use a same day for the wound to be evaluated.    KINDRA Man

## 2021-12-13 NOTE — TELEPHONE ENCOUNTER
Patient is called and scheduled for video visit at 8:40/9am and ok to use same day per PCP.    KINDRA Man

## 2021-12-13 NOTE — TELEPHONE ENCOUNTER
Elena called back and is needing RN to put in that start of care is now to be 12-16-21 per [facility] policy given patient is to be seen on 12-15-21.    Ok for start of care to be on 12-16-21 per [facility] Policy.    Ok to just put in Epic and Elena will be able to see this order.    KINDRA Man

## 2021-12-15 ENCOUNTER — VIRTUAL VISIT (OUTPATIENT)
Dept: FAMILY MEDICINE | Facility: CLINIC | Age: 46
End: 2021-12-15
Payer: COMMERCIAL

## 2021-12-15 ENCOUNTER — HOME INFUSION (PRE-WILLOW HOME INFUSION) (OUTPATIENT)
Dept: PHARMACY | Facility: CLINIC | Age: 46
End: 2021-12-15

## 2021-12-15 DIAGNOSIS — U07.1 INFECTION DUE TO 2019 NOVEL CORONAVIRUS: ICD-10-CM

## 2021-12-15 DIAGNOSIS — G35 MULTIPLE SCLEROSIS (H): Chronic | ICD-10-CM

## 2021-12-15 DIAGNOSIS — S81.002A OPEN WOUND OF LEFT KNEE, LEG, AND ANKLE, INITIAL ENCOUNTER: Primary | ICD-10-CM

## 2021-12-15 DIAGNOSIS — S91.002A OPEN WOUND OF LEFT KNEE, LEG, AND ANKLE, INITIAL ENCOUNTER: Primary | ICD-10-CM

## 2021-12-15 DIAGNOSIS — I10 BENIGN ESSENTIAL HYPERTENSION: Chronic | ICD-10-CM

## 2021-12-15 DIAGNOSIS — R00.0 SINUS TACHYCARDIA: ICD-10-CM

## 2021-12-15 DIAGNOSIS — S81.802A OPEN WOUND OF LEFT KNEE, LEG, AND ANKLE, INITIAL ENCOUNTER: Primary | ICD-10-CM

## 2021-12-15 DIAGNOSIS — I89.0 LYMPHEDEMA: ICD-10-CM

## 2021-12-15 DIAGNOSIS — E66.01 MORBID OBESITY (H): Chronic | ICD-10-CM

## 2021-12-15 PROCEDURE — 99214 OFFICE O/P EST MOD 30 MIN: CPT | Mod: GT | Performed by: FAMILY MEDICINE

## 2021-12-15 RX ORDER — METOPROLOL SUCCINATE 25 MG/1
TABLET, EXTENDED RELEASE ORAL
Qty: 90 TABLET | Refills: 1 | Status: SHIPPED | OUTPATIENT
Start: 2021-12-15 | End: 2022-10-21

## 2021-12-15 RX ORDER — GABAPENTIN 100 MG/1
CAPSULE ORAL
Qty: 540 CAPSULE | Refills: 1 | Status: ON HOLD | OUTPATIENT
Start: 2021-12-15 | End: 2022-04-12

## 2021-12-15 NOTE — PROGRESS NOTES
Bess is a 46 year old who is being evaluated via a billable video visit.      How would you like to obtain your AVS? MyChart  If the video visit is dropped, the invitation should be resent by: Text to cell phone: [phone]  Will anyone else be joining your video visit? No    Video Start Time: 9:02 AM    Assessment & Plan     (S81.002A,  S81.802A,  S91.002A) Open wound of left knee, leg, and ankle, initial encounter  (primary encounter diagnosis)  Comment:    Plan: home care wound care nurse to see starting tomorrow. This is her face to face    (U07.1) Infection due to 2019 novel coronavirus  Comment:    Plan: had MAB yesterday and overall is feeling symptoms are pretty mild    (I89.0) Lymphedema  Comment:     Plan: may need additional lymphedema therapy    (G35) Multiple sclerosis (H)  Comment:    Plan: contributes to her overall risk/poor health    (E66.01) Morbid obesity (H)  Comment:   Plan: contributes to overall risk/poor health            Documentation of Face-to-Face and Certification for Home Health Services     Patient: Bess Enamorado   YOB: 1975  MR Number: [id number]  Today's Date: 12/15/2021    I certify that patient: Bess Enamorado is under my care and that I, or a nurse practitioner or physician's assistant working with me, had a face-to-face encounter that meets the physician face-to-face encounter requirements with this patient on: 12/15/2021.    This encounter with the patient was in whole, or in part, for the following medical condition, which is the primary reason for home health care: left leg wound and lymphedema.    I certify that, based on my findings, the following services are medically necessary home health services: Nursing and Physical Therapy.    My clinical findings support the need for the above services because: Nurse is needed: wound care left leg. and Physical Therapy Services are needed to assess and treat the following functional impairments: lymphedema  "therapy.    Further, I certify that my clinical findings support that this patient is homebound (i.e. absences from home require considerable and taxing effort and are for medical reasons or [employer]ond[employer] services or infrequently or of short duration when for other reasons) because: Requires assistance of another person or specialized equipment to access medical services because patient: Is unable to operate assistive equipment on their own...    Based on the above findings. I certify that this patient is confined to the home and needs intermittent skilled nursing care, physical therapy and/or speech therapy.  The patient is under my care, and I have initiated the establishment of the plan of care.  This patient will be followed by a physician who will periodically review the plan of care.  Physician/Provider to provide follow up care: Mikala Luna    Responsible Medicare certified [place] Physician: Mikala Luna M.D.      Physician Signature: See electronic signature associated with these discharge orders.  Date: 12/15/2021               BMI:   Estimated body mass index is 70.86 kg/m  as calculated from the following:    Height as of 5/25/21: 1.6 m (5' 3\").    Weight as of 5/25/21: 181.4 kg (400 lb).           No follow-ups on file.    Mikala Luna MD  [facility]    Cornell Kellogg is a 46 year old who presents for the following health issues     HPI     Concern - Wound  Onset: August 2021  Description: Left ankle  Intensity: moderate  Progression of Symptoms:  same  Accompanying Signs & Symptoms: drainage, sometimes red  Previous history of similar problem: ongoing wound  Therapies tried and outcome: hydrophilic wound dressing.    E-visit doesn't qualify for homecare, needs a virtual to qualify    [facility] been [facility] to [facility] just a few week.  She was just getting PT.     Hasn't seen lymphedema therapist since May.   Lymphedema is \"doing okay\".  Has one part " "that is weeping a little bit. Wearing wraps and has to adjust them throughout the day.  Would like to have a lymphedema therapist check to \"make sure it's okay\".     See e-visit from 12/8 for picture of the wound on her left leg.   Wound started with the bolts on her wheelchair - it was scraped and repeatedly irritated.  Has been very careful, but having to wear socks is not allowing it to heal. Cannot reach the area to put anything on it.        Also had COVID and was able to get monoclonal antibody yesterday in [facility].  Symptoms started 12/6 - tomorrow is day 10.  She was fully vaccinated. Symptoms very mild.      Review of Systems   Constitutional, HEENT, cardiovascular, pulmonary, gi and gu systems are negative, except as otherwise noted.      Objective           Vitals:  No vitals were obtained today due to virtual visit.    Physical Exam   GENERAL: Healthy, alert and no distress  EYES: Eyes grossly normal to inspection.  No discharge or erythema, or obvious scleral/conjunctival abnormalities.  RESP: No audible wheeze, cough, or visible cyanosis.  No visible retractions or increased work of breathing.    SKIN:   Left lateral ankle wound present.  Visible swelling/edema.      NEURO: Cranial nerves grossly intact.  Mentation and speech appropriate for age.  PSYCH: Mentation appears normal, affect normal/bright, judgement and insight intact, normal speech and appearance well-groomed.                Video-Visit Details    Type of service:  Video Visit    Video End Time:9:13 AM    Originating Location (pt. Location): Home    Distant Location (provider location):  [facility]     Platform used for Video Visit: Moisés"

## 2021-12-15 NOTE — TELEPHONE ENCOUNTER
Pending Prescriptions:                       Disp   Refills    gabapentin (NEURONTIN) 100 MG capsule [Ph*540 ca*1            Sig: TAKE 2 CAPSULES(200 MG) BY MOUTH THREE TIMES           DAILY    metoprolol succinate ER (TOPROL-XL) 25 MG*90 tab*1            Sig: TAKE 1 TABLET(25 MG) BY MOUTH DAILY    Routing refill request to provider for review/approval because:  Drug not on the [facility] refill protocol       Bam Merritt RN

## 2021-12-16 ENCOUNTER — MEDICAL CORRESPONDENCE (OUTPATIENT)
Dept: HEALTH INFORMATION MANAGEMENT | Facility: CLINIC | Age: 46
End: 2021-12-16
Payer: COMMERCIAL

## 2021-12-16 ENCOUNTER — TELEPHONE (OUTPATIENT)
Dept: FAMILY MEDICINE | Facility: CLINIC | Age: 46
End: 2021-12-16
Payer: COMMERCIAL

## 2021-12-16 NOTE — TELEPHONE ENCOUNTER
Left message on confidential voicemail for Jesusita with [facility]. OK to give verbal orders per RN protocol. Verbal orders given for :  SN  2 x week for 8 weeks   eval  Wound care to ankle, wound cleanes, cover with foam dressing, will contact wound care nurse  Direct line provided if there are questions.  Alix Dasilva RN

## 2021-12-16 NOTE — TELEPHONE ENCOUNTER
Reason for Call: Request for an order or referral:    Order or referral being requested: Verbal Orders  SN  2 x week for 8 weeks   eval  Wound care to ankle, wound cleanes, cover with foam dressing, will contact wound care nurse    Date needed: as soon as possible    Has the patient been seen by the PCP for this problem? Not Applicable    Additional comments: none    Phone number Patient can be reached at:  Other phone number:  Jesusita [phone] Jesusita     Best Time:  any    Can we leave a detailed message on this number?  YES    Call taken on 12/16/2021 at 3:18 PM by Christie Ellison

## 2021-12-21 ENCOUNTER — TELEPHONE (OUTPATIENT)
Dept: FAMILY MEDICINE | Facility: CLINIC | Age: 46
End: 2021-12-21
Payer: COMMERCIAL

## 2021-12-21 NOTE — TELEPHONE ENCOUNTER
Wound Care - cleanse normal saline thera nohey sheet on wound and then borderd foam dressing    Reason for Call:  Home Health Care    Jesusita with [facility] called regarding (reason for call): Please call with verbal orders - OK to leave message    Orders are needed for this patient.     Skilled Nursing: Wound Care - Cleanse with normal saline.  Apply Thera-honey sheet to wound and then border with foam dressing - Change 2x weekly and prn for drainage    Pt Provider: Jeremy    Phone Number Homecare Nurse can be reached at: [phone]    Can we leave a detailed message on this number? YES    Phone number patient can be reached at: [facility] number on file [phone] (home)    Best Time: any    Call taken on 12/21/2021 at 12:22 PM by Jewels Stevens

## 2021-12-22 ENCOUNTER — MEDICAL CORRESPONDENCE (OUTPATIENT)
Dept: HEALTH INFORMATION MANAGEMENT | Facility: CLINIC | Age: 46
End: 2021-12-22
Payer: COMMERCIAL

## 2021-12-23 ENCOUNTER — TELEPHONE (OUTPATIENT)
Dept: FAMILY MEDICINE | Facility: CLINIC | Age: 46
End: 2021-12-23
Payer: COMMERCIAL

## 2021-12-23 NOTE — TELEPHONE ENCOUNTER
Reason for Call:  Home Health Care    Mariza with [facility] called regarding (reason for call): Patient refused wound care visit today, is not available for visit till Monday.     Phone Number Homecare Nurse can be reached at: [phone]    Can we leave a detailed message on this number? YES    Phone number patient can be reached at: Home number on file [phone] (home)    Best Time: any    Call taken on 12/23/2021 at 3:47 PM by Araseli Mo

## 2022-01-03 ENCOUNTER — MEDICAL CORRESPONDENCE (OUTPATIENT)
Dept: HEALTH INFORMATION MANAGEMENT | Facility: CLINIC | Age: 47
End: 2022-01-03
Payer: COMMERCIAL

## 2022-01-05 ENCOUNTER — MEDICAL CORRESPONDENCE (OUTPATIENT)
Dept: HEALTH INFORMATION MANAGEMENT | Facility: CLINIC | Age: 47
End: 2022-01-05
Payer: COMMERCIAL

## 2022-01-06 ENCOUNTER — TELEPHONE (OUTPATIENT)
Dept: FAMILY MEDICINE | Facility: CLINIC | Age: 47
End: 2022-01-06
Payer: COMMERCIAL

## 2022-01-06 NOTE — TELEPHONE ENCOUNTER
Hi Dr. Luna,    Home care calling.   Patient has new wound right lower shin, requesting order to treat similar to current ankle wound treatment.    Serosanguinous drainage, moderate amount    Cleanse wound with Normal Saline or Wound Cleanser  Pat dry with gauze  Cut a piece of thera honey and cover wound  Cover with Abd. Pad or gauze dependent on amount of drainage  Wrap with Kelix and tape in place    2 time per week and PRN for drainage  Okay to discontinue treatment once wound is healed.     Marce Wilcox RN on 1/6/2022 at 3:02 PM  ______________________________________  Bianca, [facility]  [phone]

## 2022-01-08 ENCOUNTER — HEALTH MAINTENANCE LETTER (OUTPATIENT)
Age: 47
End: 2022-01-08

## 2022-01-10 ENCOUNTER — MEDICAL CORRESPONDENCE (OUTPATIENT)
Dept: HEALTH INFORMATION MANAGEMENT | Facility: CLINIC | Age: 47
End: 2022-01-10
Payer: COMMERCIAL

## 2022-01-11 NOTE — PROGRESS NOTES
This is a recent snapshot of the patient's [facility] medical record.  For current drug dose and complete information and questions, call [phone] or In Basket pool, [facility] home infusion ([id number])  CSN Number:  [id number]

## 2022-01-17 NOTE — PROGRESS NOTES
This is a recent snapshot of the patient's [facility] medical record.  For current drug dose and complete information and questions, call [phone] or In Basket pool, [facility] home infusion ([id number])  CSN Number:  [id number]

## 2022-01-31 ENCOUNTER — MEDICAL CORRESPONDENCE (OUTPATIENT)
Dept: HEALTH INFORMATION MANAGEMENT | Facility: CLINIC | Age: 47
End: 2022-01-31
Payer: COMMERCIAL

## 2022-02-08 ENCOUNTER — APPOINTMENT (OUTPATIENT)
Dept: CT IMAGING | Facility: HOSPITAL | Age: 47
DRG: 871 | End: 2022-02-08
Attending: EMERGENCY MEDICINE
Payer: COMMERCIAL

## 2022-02-08 ENCOUNTER — HOSPITAL ENCOUNTER (INPATIENT)
Facility: HOSPITAL | Age: 47
LOS: 10 days | Discharge: HOME | DRG: 871 | End: 2022-02-18
Attending: EMERGENCY MEDICINE | Admitting: FAMILY MEDICINE
Payer: COMMERCIAL

## 2022-02-08 ENCOUNTER — APPOINTMENT (OUTPATIENT)
Dept: ULTRASOUND IMAGING | Facility: HOSPITAL | Age: 47
DRG: 871 | End: 2022-02-08
Attending: EMERGENCY MEDICINE
Payer: COMMERCIAL

## 2022-02-08 DIAGNOSIS — L03.90 CELLULITIS, UNSPECIFIED CELLULITIS SITE: ICD-10-CM

## 2022-02-08 DIAGNOSIS — I26.99 ACUTE PULMONARY EMBOLISM WITHOUT ACUTE COR PULMONALE, UNSPECIFIED PULMONARY EMBOLISM TYPE (H): ICD-10-CM

## 2022-02-08 DIAGNOSIS — E66.01 MORBID OBESITY (H): Chronic | ICD-10-CM

## 2022-02-08 DIAGNOSIS — U07.1 COVID-19: ICD-10-CM

## 2022-02-08 DIAGNOSIS — I50.33 ACUTE ON CHRONIC DIASTOLIC CONGESTIVE HEART FAILURE (H): ICD-10-CM

## 2022-02-08 DIAGNOSIS — G47.33 OSA (OBSTRUCTIVE SLEEP APNEA): Chronic | ICD-10-CM

## 2022-02-08 DIAGNOSIS — I89.0 LYMPHEDEMA OF BOTH LOWER EXTREMITIES: ICD-10-CM

## 2022-02-08 DIAGNOSIS — Z30.41 ENCOUNTER FOR SURVEILLANCE OF CONTRACEPTIVE PILLS: ICD-10-CM

## 2022-02-08 DIAGNOSIS — J18.9 PNEUMONIA DUE TO INFECTIOUS ORGANISM, UNSPECIFIED LATERALITY, UNSPECIFIED PART OF LUNG: ICD-10-CM

## 2022-02-08 DIAGNOSIS — L03.115 CELLULITIS OF RIGHT LEG: ICD-10-CM

## 2022-02-08 DIAGNOSIS — J96.01 ACUTE RESPIRATORY FAILURE WITH HYPOXIA (H): Primary | ICD-10-CM

## 2022-02-08 DIAGNOSIS — E28.2 POLYCYSTIC OVARIES: ICD-10-CM

## 2022-02-08 DIAGNOSIS — I51.89 MODERATE RIGHT VENTRICULAR SYSTOLIC DYSFUNCTION: ICD-10-CM

## 2022-02-08 LAB
ALBUMIN SERPL-MCNC: 3.1 G/DL (ref 3.5–5)
ALBUMIN UR-MCNC: NEGATIVE MG/DL
ALP SERPL-CCNC: 40 U/L (ref 45–120)
ALT SERPL W P-5'-P-CCNC: 12 U/L (ref 0–45)
ANION GAP SERPL CALCULATED.3IONS-SCNC: 11 MMOL/L (ref 5–18)
APPEARANCE UR: CLEAR
AST SERPL W P-5'-P-CCNC: 18 U/L (ref 0–40)
BACTERIA #/AREA URNS HPF: ABNORMAL /HPF
BASE EXCESS BLDA CALC-SCNC: 0 MMOL/L
BASE EXCESS BLDV CALC-SCNC: -0.9 MMOL/L
BASOPHILS # BLD AUTO: 0.1 10E3/UL (ref 0–0.2)
BASOPHILS NFR BLD AUTO: 0 %
BILIRUB SERPL-MCNC: 2.3 MG/DL (ref 0–1)
BILIRUB UR QL STRIP: NEGATIVE
BNP SERPL-MCNC: 2230 PG/ML (ref 0–67)
BUN SERPL-MCNC: 16 MG/DL (ref 8–22)
C REACTIVE PROTEIN LHE: 7.9 MG/DL (ref 0–0.8)
CALCIUM SERPL-MCNC: 9.5 MG/DL (ref 8.5–10.5)
CHLORIDE BLD-SCNC: 105 MMOL/L (ref 98–107)
CO2 SERPL-SCNC: 18 MMOL/L (ref 22–31)
COHGB MFR BLD: 94.2 % (ref 96–97)
COLOR UR AUTO: COLORLESS
CREAT SERPL-MCNC: 0.76 MG/DL (ref 0.6–1.1)
D DIMER PPP FEU-MCNC: 2.33 UG/ML FEU (ref 0–0.5)
EOSINOPHIL # BLD AUTO: 0 10E3/UL (ref 0–0.7)
EOSINOPHIL NFR BLD AUTO: 0 %
ERYTHROCYTE [DISTWIDTH] IN BLOOD BY AUTOMATED COUNT: 15.4 % (ref 10–15)
ERYTHROCYTE [DISTWIDTH] IN BLOOD BY AUTOMATED COUNT: 15.5 % (ref 10–15)
FLUAV RNA SPEC QL NAA+PROBE: NEGATIVE
FLUBV RNA RESP QL NAA+PROBE: NEGATIVE
GFR SERPL CREATININE-BSD FRML MDRD: >90 ML/MIN/1.73M2
GLUCOSE BLD-MCNC: 116 MG/DL (ref 70–125)
GLUCOSE UR STRIP-MCNC: NEGATIVE MG/DL
HCO3 BLD-SCNC: 25 MMOL/L (ref 23–29)
HCO3 BLDV-SCNC: 24 MMOL/L (ref 24–30)
HCT VFR BLD AUTO: 42 % (ref 35–47)
HCT VFR BLD AUTO: 43.1 % (ref 35–47)
HGB BLD-MCNC: 13.1 G/DL (ref 11.7–15.7)
HGB BLD-MCNC: 13.7 G/DL (ref 11.7–15.7)
HGB UR QL STRIP: ABNORMAL
HOLD SPECIMEN: NORMAL
HOLD SPECIMEN: NORMAL
IMM GRANULOCYTES # BLD: 0.1 10E3/UL
IMM GRANULOCYTES NFR BLD: 1 %
KETONES UR STRIP-MCNC: NEGATIVE MG/DL
LACTATE SERPL-SCNC: 1.4 MMOL/L (ref 0.7–2)
LEUKOCYTE ESTERASE UR QL STRIP: NEGATIVE
LYMPHOCYTES # BLD AUTO: 1.3 10E3/UL (ref 0.8–5.3)
LYMPHOCYTES NFR BLD AUTO: 8 %
MCH RBC QN AUTO: 27.1 PG (ref 26.5–33)
MCH RBC QN AUTO: 27.3 PG (ref 26.5–33)
MCHC RBC AUTO-ENTMCNC: 31.2 G/DL (ref 31.5–36.5)
MCHC RBC AUTO-ENTMCNC: 31.8 G/DL (ref 31.5–36.5)
MCV RBC AUTO: 86 FL (ref 78–100)
MCV RBC AUTO: 87 FL (ref 78–100)
MONOCYTES # BLD AUTO: 0.8 10E3/UL (ref 0–1.3)
MONOCYTES NFR BLD AUTO: 5 %
MUCOUS THREADS #/AREA URNS LPF: PRESENT /LPF
NEUTROPHILS # BLD AUTO: 13.2 10E3/UL (ref 1.6–8.3)
NEUTROPHILS NFR BLD AUTO: 86 %
NITRATE UR QL: NEGATIVE
NRBC # BLD AUTO: 0 10E3/UL
NRBC BLD AUTO-RTO: 0 /100
OXYHGB MFR BLD: 92.6 % (ref 96–97)
OXYHGB MFR BLDV: 88.2 % (ref 70–75)
PCO2 BLD: 34 MM HG (ref 35–45)
PCO2 BLDV: 33 MM HG (ref 35–50)
PH BLD: 7.46 [PH] (ref 7.37–7.44)
PH BLDV: 7.45 [PH] (ref 7.35–7.45)
PH UR STRIP: 5 [PH] (ref 5–7)
PLATELET # BLD AUTO: 240 10E3/UL (ref 150–450)
PLATELET # BLD AUTO: 269 10E3/UL (ref 150–450)
PO2 BLD: 63 MM HG (ref 80–90)
PO2 BLDV: 55 MM HG (ref 25–47)
POTASSIUM BLD-SCNC: 4.3 MMOL/L (ref 3.5–5)
PROCALCITONIN SERPL-MCNC: 1 NG/ML (ref 0–0.49)
PROT SERPL-MCNC: 7.3 G/DL (ref 6–8)
RBC # BLD AUTO: 4.84 10E6/UL (ref 3.8–5.2)
RBC # BLD AUTO: 5.02 10E6/UL (ref 3.8–5.2)
RBC URINE: 23 /HPF
SAO2 % BLDV: 89.7 % (ref 70–75)
SARS-COV-2 RNA RESP QL NAA+PROBE: POSITIVE
SODIUM SERPL-SCNC: 134 MMOL/L (ref 136–145)
SP GR UR STRIP: 1.01 (ref 1–1.03)
SQUAMOUS EPITHELIAL: 1 /HPF
TEMPERATURE: 37 DEGREES C
UFH PPP CHRO-ACNC: 0.12 IU/ML
UFH PPP CHRO-ACNC: <0.1 IU/ML
UROBILINOGEN UR STRIP-MCNC: <2 MG/DL
WBC # BLD AUTO: 13.6 10E3/UL (ref 4–11)
WBC # BLD AUTO: 15.3 10E3/UL (ref 4–11)
WBC URINE: 3 /HPF

## 2022-02-08 PROCEDURE — 250N000011 HC RX IP 250 OP 636: Performed by: FAMILY MEDICINE

## 2022-02-08 PROCEDURE — 99285 EMERGENCY DEPT VISIT HI MDM: CPT | Mod: 25

## 2022-02-08 PROCEDURE — 87077 CULTURE AEROBIC IDENTIFY: CPT | Performed by: EMERGENCY MEDICINE

## 2022-02-08 PROCEDURE — 83880 ASSAY OF NATRIURETIC PEPTIDE: CPT | Performed by: EMERGENCY MEDICINE

## 2022-02-08 PROCEDURE — 85520 HEPARIN ASSAY: CPT | Performed by: FAMILY MEDICINE

## 2022-02-08 PROCEDURE — C9803 HOPD COVID-19 SPEC COLLECT: HCPCS

## 2022-02-08 PROCEDURE — 93970 EXTREMITY STUDY: CPT

## 2022-02-08 PROCEDURE — 85014 HEMATOCRIT: CPT | Performed by: EMERGENCY MEDICINE

## 2022-02-08 PROCEDURE — 84484 ASSAY OF TROPONIN QUANT: CPT | Performed by: FAMILY MEDICINE

## 2022-02-08 PROCEDURE — 258N000003 HC RX IP 258 OP 636: Performed by: FAMILY MEDICINE

## 2022-02-08 PROCEDURE — 36415 COLL VENOUS BLD VENIPUNCTURE: CPT | Performed by: FAMILY MEDICINE

## 2022-02-08 PROCEDURE — 51702 INSERT TEMP BLADDER CATH: CPT

## 2022-02-08 PROCEDURE — 85520 HEPARIN ASSAY: CPT | Performed by: EMERGENCY MEDICINE

## 2022-02-08 PROCEDURE — 83735 ASSAY OF MAGNESIUM: CPT | Performed by: FAMILY MEDICINE

## 2022-02-08 PROCEDURE — 82805 BLOOD GASES W/O2 SATURATION: CPT | Performed by: FAMILY MEDICINE

## 2022-02-08 PROCEDURE — 87149 DNA/RNA DIRECT PROBE: CPT | Performed by: EMERGENCY MEDICINE

## 2022-02-08 PROCEDURE — 250N000011 HC RX IP 250 OP 636: Performed by: EMERGENCY MEDICINE

## 2022-02-08 PROCEDURE — 36415 COLL VENOUS BLD VENIPUNCTURE: CPT | Performed by: STUDENT IN AN ORGANIZED HEALTH CARE EDUCATION/TRAINING PROGRAM

## 2022-02-08 PROCEDURE — 85025 COMPLETE CBC W/AUTO DIFF WBC: CPT | Performed by: STUDENT IN AN ORGANIZED HEALTH CARE EDUCATION/TRAINING PROGRAM

## 2022-02-08 PROCEDURE — 96365 THER/PROPH/DIAG IV INF INIT: CPT | Mod: 59

## 2022-02-08 PROCEDURE — 250N000013 HC RX MED GY IP 250 OP 250 PS 637: Performed by: FAMILY MEDICINE

## 2022-02-08 PROCEDURE — 258N000003 HC RX IP 258 OP 636: Performed by: EMERGENCY MEDICINE

## 2022-02-08 PROCEDURE — 83605 ASSAY OF LACTIC ACID: CPT | Performed by: STUDENT IN AN ORGANIZED HEALTH CARE EDUCATION/TRAINING PROGRAM

## 2022-02-08 PROCEDURE — 210N000001 HC R&B IMCU HEART CARE

## 2022-02-08 PROCEDURE — 96366 THER/PROPH/DIAG IV INF ADDON: CPT

## 2022-02-08 PROCEDURE — 5A09457 ASSISTANCE WITH RESPIRATORY VENTILATION, 24-96 CONSECUTIVE HOURS, CONTINUOUS POSITIVE AIRWAY PRESSURE: ICD-10-PCS | Performed by: FAMILY MEDICINE

## 2022-02-08 PROCEDURE — 87636 SARSCOV2 & INF A&B AMP PRB: CPT | Performed by: EMERGENCY MEDICINE

## 2022-02-08 PROCEDURE — 85379 FIBRIN DEGRADATION QUANT: CPT | Performed by: FAMILY MEDICINE

## 2022-02-08 PROCEDURE — 96375 TX/PRO/DX INJ NEW DRUG ADDON: CPT

## 2022-02-08 PROCEDURE — 99223 1ST HOSP IP/OBS HIGH 75: CPT | Performed by: FAMILY MEDICINE

## 2022-02-08 PROCEDURE — 84145 PROCALCITONIN (PCT): CPT | Performed by: FAMILY MEDICINE

## 2022-02-08 PROCEDURE — 96367 TX/PROPH/DG ADDL SEQ IV INF: CPT

## 2022-02-08 PROCEDURE — 82805 BLOOD GASES W/O2 SATURATION: CPT | Performed by: EMERGENCY MEDICINE

## 2022-02-08 PROCEDURE — 86140 C-REACTIVE PROTEIN: CPT | Performed by: FAMILY MEDICINE

## 2022-02-08 PROCEDURE — 71275 CT ANGIOGRAPHY CHEST: CPT

## 2022-02-08 PROCEDURE — 999N000157 HC STATISTIC RCP TIME EA 10 MIN

## 2022-02-08 PROCEDURE — 80053 COMPREHEN METABOLIC PANEL: CPT | Performed by: STUDENT IN AN ORGANIZED HEALTH CARE EDUCATION/TRAINING PROGRAM

## 2022-02-08 PROCEDURE — 93005 ELECTROCARDIOGRAM TRACING: CPT | Performed by: STUDENT IN AN ORGANIZED HEALTH CARE EDUCATION/TRAINING PROGRAM

## 2022-02-08 PROCEDURE — 36600 WITHDRAWAL OF ARTERIAL BLOOD: CPT

## 2022-02-08 PROCEDURE — 94660 CPAP INITIATION&MGMT: CPT

## 2022-02-08 PROCEDURE — 36415 COLL VENOUS BLD VENIPUNCTURE: CPT | Performed by: EMERGENCY MEDICINE

## 2022-02-08 PROCEDURE — 81001 URINALYSIS AUTO W/SCOPE: CPT | Performed by: FAMILY MEDICINE

## 2022-02-08 PROCEDURE — 96376 TX/PRO/DX INJ SAME DRUG ADON: CPT

## 2022-02-08 RX ORDER — ACETAMINOPHEN 325 MG/1
650 TABLET ORAL EVERY 6 HOURS PRN
Status: DISCONTINUED | OUTPATIENT
Start: 2022-02-08 | End: 2022-02-18 | Stop reason: HOSPADM

## 2022-02-08 RX ORDER — AMOXICILLIN 250 MG
2 CAPSULE ORAL 2 TIMES DAILY PRN
Status: DISCONTINUED | OUTPATIENT
Start: 2022-02-08 | End: 2022-02-18 | Stop reason: HOSPADM

## 2022-02-08 RX ORDER — IOPAMIDOL 755 MG/ML
100 INJECTION, SOLUTION INTRAVASCULAR ONCE
Status: COMPLETED | OUTPATIENT
Start: 2022-02-08 | End: 2022-02-08

## 2022-02-08 RX ORDER — PIPERACILLIN SODIUM, TAZOBACTAM SODIUM 3; .375 G/15ML; G/15ML
3.38 INJECTION, POWDER, LYOPHILIZED, FOR SOLUTION INTRAVENOUS EVERY 8 HOURS
Status: DISCONTINUED | OUTPATIENT
Start: 2022-02-08 | End: 2022-02-11

## 2022-02-08 RX ORDER — FUROSEMIDE 10 MG/ML
40 INJECTION INTRAMUSCULAR; INTRAVENOUS EVERY 8 HOURS
Status: DISCONTINUED | OUTPATIENT
Start: 2022-02-09 | End: 2022-02-09 | Stop reason: DRUGHIGH

## 2022-02-08 RX ORDER — ONDANSETRON 2 MG/ML
4 INJECTION INTRAMUSCULAR; INTRAVENOUS EVERY 6 HOURS PRN
Status: DISCONTINUED | OUTPATIENT
Start: 2022-02-08 | End: 2022-02-18 | Stop reason: HOSPADM

## 2022-02-08 RX ORDER — GABAPENTIN 100 MG/1
200 CAPSULE ORAL 3 TIMES DAILY
Status: DISCONTINUED | OUTPATIENT
Start: 2022-02-08 | End: 2022-02-18 | Stop reason: HOSPADM

## 2022-02-08 RX ORDER — LISINOPRIL 20 MG/1
20 TABLET ORAL DAILY
Status: DISCONTINUED | OUTPATIENT
Start: 2022-02-08 | End: 2022-02-09

## 2022-02-08 RX ORDER — ACETAMINOPHEN 325 MG/1
650 TABLET ORAL EVERY 6 HOURS PRN
Status: DISCONTINUED | OUTPATIENT
Start: 2022-02-08 | End: 2022-02-10

## 2022-02-08 RX ORDER — FUROSEMIDE 10 MG/ML
40 INJECTION INTRAMUSCULAR; INTRAVENOUS EVERY 12 HOURS
Status: DISCONTINUED | OUTPATIENT
Start: 2022-02-08 | End: 2022-02-08

## 2022-02-08 RX ORDER — METOPROLOL SUCCINATE 25 MG/1
25 TABLET, EXTENDED RELEASE ORAL DAILY
Status: DISCONTINUED | OUTPATIENT
Start: 2022-02-08 | End: 2022-02-18 | Stop reason: HOSPADM

## 2022-02-08 RX ORDER — PIPERACILLIN SODIUM, TAZOBACTAM SODIUM 3; .375 G/15ML; G/15ML
3.38 INJECTION, POWDER, LYOPHILIZED, FOR SOLUTION INTRAVENOUS ONCE
Status: COMPLETED | OUTPATIENT
Start: 2022-02-08 | End: 2022-02-08

## 2022-02-08 RX ORDER — LIDOCAINE 40 MG/G
CREAM TOPICAL
Status: DISCONTINUED | OUTPATIENT
Start: 2022-02-08 | End: 2022-02-18 | Stop reason: HOSPADM

## 2022-02-08 RX ORDER — SODIUM CHLORIDE, SODIUM LACTATE, POTASSIUM CHLORIDE, CALCIUM CHLORIDE 600; 310; 30; 20 MG/100ML; MG/100ML; MG/100ML; MG/100ML
INJECTION, SOLUTION INTRAVENOUS CONTINUOUS
Status: DISCONTINUED | OUTPATIENT
Start: 2022-02-08 | End: 2022-02-08

## 2022-02-08 RX ORDER — HEPARIN SODIUM 10000 [USP'U]/100ML
0-5000 INJECTION, SOLUTION INTRAVENOUS CONTINUOUS
Status: DISCONTINUED | OUTPATIENT
Start: 2022-02-08 | End: 2022-02-11

## 2022-02-08 RX ORDER — ONDANSETRON 4 MG/1
4 TABLET, ORALLY DISINTEGRATING ORAL EVERY 6 HOURS PRN
Status: DISCONTINUED | OUTPATIENT
Start: 2022-02-08 | End: 2022-02-18 | Stop reason: HOSPADM

## 2022-02-08 RX ORDER — CEFAZOLIN SODIUM 1 G/50ML
2500 SOLUTION INTRAVENOUS ONCE
Status: COMPLETED | OUTPATIENT
Start: 2022-02-08 | End: 2022-02-08

## 2022-02-08 RX ORDER — ACETAMINOPHEN 650 MG/1
650 SUPPOSITORY RECTAL EVERY 6 HOURS PRN
Status: DISCONTINUED | OUTPATIENT
Start: 2022-02-08 | End: 2022-02-18 | Stop reason: HOSPADM

## 2022-02-08 RX ORDER — AMOXICILLIN 250 MG
1 CAPSULE ORAL 2 TIMES DAILY PRN
Status: DISCONTINUED | OUTPATIENT
Start: 2022-02-08 | End: 2022-02-18 | Stop reason: HOSPADM

## 2022-02-08 RX ADMIN — SERTRALINE HYDROCHLORIDE 100 MG: 50 TABLET ORAL at 21:23

## 2022-02-08 RX ADMIN — METOPROLOL SUCCINATE 25 MG: 25 TABLET, EXTENDED RELEASE ORAL at 21:24

## 2022-02-08 RX ADMIN — HEPARIN SODIUM 1800 UNITS/HR: 1000 INJECTION, SOLUTION INTRAVENOUS; SUBCUTANEOUS at 16:23

## 2022-02-08 RX ADMIN — MICONAZOLE NITRATE: 20 POWDER TOPICAL at 21:25

## 2022-02-08 RX ADMIN — ACETAMINOPHEN 650 MG: 325 TABLET ORAL at 21:30

## 2022-02-08 RX ADMIN — IOPAMIDOL 100 ML: 755 INJECTION, SOLUTION INTRAVENOUS at 15:04

## 2022-02-08 RX ADMIN — VANCOMYCIN HYDROCHLORIDE 1500 MG: 5 INJECTION, POWDER, LYOPHILIZED, FOR SOLUTION INTRAVENOUS at 23:58

## 2022-02-08 RX ADMIN — LISINOPRIL 20 MG: 20 TABLET ORAL at 21:24

## 2022-02-08 RX ADMIN — FUROSEMIDE 40 MG: 10 INJECTION, SOLUTION INTRAMUSCULAR; INTRAVENOUS at 18:34

## 2022-02-08 RX ADMIN — PIPERACILLIN SODIUM AND TAZOBACTAM SODIUM 3.38 G: 3; .375 INJECTION, POWDER, LYOPHILIZED, FOR SOLUTION INTRAVENOUS at 18:37

## 2022-02-08 RX ADMIN — PIPERACILLIN AND TAZOBACTAM 3.38 G: 3; .375 INJECTION, POWDER, LYOPHILIZED, FOR SOLUTION INTRAVENOUS at 11:50

## 2022-02-08 RX ADMIN — GABAPENTIN 200 MG: 100 CAPSULE ORAL at 21:24

## 2022-02-08 RX ADMIN — VANCOMYCIN HYDROCHLORIDE 2500 MG: 1 INJECTION, POWDER, LYOPHILIZED, FOR SOLUTION INTRAVENOUS at 12:30

## 2022-02-08 RX ADMIN — SODIUM CHLORIDE, POTASSIUM CHLORIDE, SODIUM LACTATE AND CALCIUM CHLORIDE 1000 ML: 600; 310; 30; 20 INJECTION, SOLUTION INTRAVENOUS at 11:35

## 2022-02-08 RX ADMIN — SODIUM CHLORIDE, POTASSIUM CHLORIDE, SODIUM LACTATE AND CALCIUM CHLORIDE: 600; 310; 30; 20 INJECTION, SOLUTION INTRAVENOUS at 13:01

## 2022-02-08 ASSESSMENT — ACTIVITIES OF DAILY LIVING (ADL)
ADLS_ACUITY_SCORE: 9

## 2022-02-08 NOTE — PHARMACY-VANCOMYCIN DOSING SERVICE
Pharmacy Vancomycin Initial Note  Date of Service 2022  Patient's  1975  46 year old, female    Indication: Skin and Soft Tissue Infection    Current estimated CrCl = CrCl cannot be calculated (Patient's most recent lab result is older than the maximum 30 days allowed.).    Creatinine for last 3 days  No results found for requested labs within last 72 hours.    Recent Vancomycin Level(s) for last 3 days  No results found for requested labs within last 72 hours.      Vancomycin IV Administrations (past 72 hours)      No vancomycin orders with administrations in past 72 hours.                Nephrotoxins and other renal medications (From now, onward)    Start     Dose/Rate Route Frequency Ordered Stop    22 1200  vancomycin (VANCOCIN) 2,500 mg in sodium chloride 0.9 % 500 mL intermittent infusion         2,500 mg  over 2 Hours Intravenous ONCE 22 1123      22 1130  piperacillin-tazobactam (ZOSYN) 3.375 g vial to attach to  mL bag         3.375 g  over 30 Minutes Intravenous ONCE 22 1114            Contrast Orders - past 72 hours (72h ago, onward)            None                Plan:  1. Start vancomycin  2500 mg IV X 1 in emergency department.  2. Please re-consult pharmacy to dose if continued as an inpatient.     Agapito Brito RPH

## 2022-02-08 NOTE — ED NOTES
Patient incontinent of urine.  Changed linen and attempted to place purwik.  Patient reports poor history with purwik.

## 2022-02-08 NOTE — H&P
[facility]    History and Physical - Hospitalist Service       Date of Admission:  2/8/2022    Principal Problem:    Acute respiratory failure with hypoxia (H)  Active Problems:    NARCISA (obstructive sleep apnea)    Cellulitis    Pneumonia due to infectious organism, unspecified laterality, unspecified part of lung    Acute pulmonary embolism without acute cor pulmonale, unspecified pulmonary embolism type (H)    COVID-19    Acute on chronic diastolic congestive heart failure (H)    NSTEMI (non-ST elevated myocardial infarction) (H)    Assessment & Plan      Bess Enamorado is a 46 year old female with a recent COVID infection on 12/6/2021 who received monoclonal antibody, MS, chronic lymphedema, hypertension, depression, obstructive sleep apnea, PCOS, MS, admitted for fever, headache, increased leg swelling and cough found to have pneumonia, repeat positive Covid PCR, cellulitis, acute hypoxic respiratory failure and suspected CHF with a small PE     Acute hypoxic respiratory failure-  ABG shows no hypercarbia, normal pH.  Combination of small PE, acute CHF, pneumonia with previous history of Covid on 12/10/2021  CT also shows signs of right heart failure and pulmonary hypertension  Agree with Zosyn and vancomycin for both pneumonia as well as cellulitis, start diuresis, I-S, flutter valve, nebulizers as needed  Hold on dexamethasone or remdesivir, ID to see tomorrow, discussed with ID.  Check procalcitonin  Check cardiac echocardiogram, cardiology to see  If work of breathing worsens then start BiPAP, discussed with on-call pulmonologist  Has chronic orthopnea and cough, get sputum culture, trying keep upright.  Hold CPAP for now given positive Covid, BiPAP as needed    Small nonocclusive right upper lobe PE  Start heparin weight-based protocol, above weight threshold for Lovenox.  Check cardiac echocardiogram, troponin, lower extremity Doppler ultrasound.  Start warfarin tomorrow if  stable.    Pulmonary infiltrates  More right-sided, more focal.  Suspect community-acquired, status post recent Covid.  Normal lactic acid.  Normotensive.  Swallow evaluation  Continue Zosyn and vancomycin, ID to see  Start I-S and flutter valve.  Check procalcitonin.  Blood culture pending    Positive Covid PCR with recent COVID on 12/10  Patient had Covid on 12/6/2021 and received monoclonal antibody, is vaccinated.  Suspect her positive is due to her previous Covid, does have a pneumonia however.  We will continue antibiotics as noted above, hold for now on remdesivir and dexamethasone, ID to evaluate tomorrow, discussed with on-call ID and pulm about plan.      Acute CHF exacerbation  BNP quite elevated, no history of CHF in the medical record.  Start furosemide 40 mg every 8hours, check cardiac echocardiogram,  Continue lisinopril and metoprolol, follow kidney function.  Cardiology to see.  Also signs of right heart failure and pulmonary hypertension but clearly is fluid overloaded.  Check troponin - elevated, ekg shows nonspecific T wave changes. Cardiology consulted    Cellulitis-with underlying lymphedema  Continue Zosyn and vancomycin, blood culture pending.  Diuresis.  Lymphedema wraps.  ID to see.  Normal lactic acid.  Normotensive.  Ultrasound shows no DVT    Essential hypertension-accelerated  Continue metoprolol and lisinopril and furosemide.  Hydralazine as needed    Obstructive sleep apnea-  Hold on CPAP given possible Covid, BiPAP as needed, discussed with on-call pulmonologist    MS  No longer taking medication.  Primarily has left lower extremity weakness and pain.  PT OT.  Check TSH and B12.    Fall with headache-  Accidental, no head trauma, no neck pain, nonfocal neurologic exam but does have mild frontal headache.  Check CT of head.  PT OT.  Fell on stomach.    Addendum: Patient continued to have significant work of breathing, tachypnea.  Discussed with on-call pulmonology and ER physician,  will start BiPAP, recheck ABGs, continue aggressive diuresis and antibiotics.  Hold on remdesivir and dexamethasone as the pneumonia does not appear to be Covid-like.  Pulmonology will see tomorrow.  N.p.o. for now.  No need for ICU transfer at this point, hypoxia mild to moderate requiring 3-5 L with tachypnea.  Repeat ABG shows pH of 7.46, PCO2 34, PO2 63, HCO3 25. Add on troponin elevated at 0.6, no ST elevation on EKG - NSTEMI, on heparin, ? Demand schemia..  Also has been having diarrhea, check C. difficile.  If worsening respiratory status will require intubation.  Discussed with ER physician who will continue to monitor patient, ICU staff and intensivist aware of situation and agree with plan.     Diet: NPO for Medical/Clinical Reasons Except for: Meds, Other; Specify: Must be off bipap for 2 hr before pillsLow-salt  DVT Prophylaxis: Heparin  Brar Catheter: PRESENT, indication:    Central Lines: None  Code Status: Full CodeFull    Clinically Significant Risk Factors Present on Admission                     Disposition Plan        The patient's care was discussed with the Bedside Nurse, Patient and Infectious disease, ER physician.    JORJE MONTIEL MD  [facility]  Securely message with the [employer] Web Console (learn more here)  Text page via [employer] Paging/Directory      ______________________________________________________________________    Chief Complaint   Fever, leg pain    History is obtained from the patient    History of Present Illness   Bess Enamorado is a 46 year old female who came to the ER for fever, increased lower extremity swelling and pain.  She was found to be febrile in the ER.  She has chronic orthopnea and cough that is productive that she says is unchanged but looks to be working more to breathe though she says this is because she is lying flat.  She denies any weight gain or loss.  She also fell earlier today accidentally when getting out of her wheelchair onto  her stomach, denies head trauma or neck or neck back pain.  She does not use oxygen at home but was found to be hypoxic here.  She denies any chest pain, abdominal pain, nausea or vomiting, focal weakness or sensation changes beyond her normal left lower extremity and right arm weakness from her MS.  She has a past history of Covid on 1012/6/21, is fully vaccinated, received monoclonal antibody and completely recovered.    Past medical history also includes MS, PCOS, chronic lymphedema, depression, anxiety, hypertension, obstructive sleep apnea using CPAP    Habits: Patient does not smoke, denies alcohol use.  Uses a wheelchair or walker    Social history patient is single, has a sister, does not work, lives in an apartment    Family history patient's father had MS    Review of Systems  The 10 point Review of Systems is negative other than noted in the HPI or here.     Past Medical History    I have reviewed this patient's medical history and updated it with pertinent information if needed.   Past Medical History:   Diagnosis Date     Acute on chronic diastolic congestive heart failure (H) 2/9/2022     Arthritis 2019    Hips     ASCUS favor benign 8/2015    Neg HPV     Depressive disorder 2010     H/O colposcopy with cervical biopsy 9/14/15    Bx & ECC - negative     Hypertension 2019     LSIL on Pap smear 7/2014    Neg high risk HPV     Multiple sclerosis (H) 8/29/2005 9/18/200pt dx with MS 2004--dx by neurolgist and is followed by Dr. Steven Ayala 10/2016     UNSPEC CONSTIPATION 9/18/2006 9/18/2006   Has tried otc suppository and otc lax.        Past Surgical History   I have reviewed this patient's surgical history and updated it with pertinent information if needed.  Past Surgical History:   Procedure Laterality Date     CHOLECYSTECTOMY, LAPOROSCOPIC      Cholecystectomy, Laparoscopic     COLONOSCOPY  2007?    Bathroom issue..results normal     OPEN REDUCTION INTERNAL FIXATION TOE(S)  4/13/2012     Procedure:OPEN REDUCTION INTERNAL FIXATION TOE(S); Open reduction internal fixation right proximal fifth metatarsal fracture-   Anes-choice block; Surgeon:LEY, JEFFREY DUANE; Location:[place] OR       Social History   I have reviewed this patient's social history and updated it with pertinent information if needed.  Social History     Tobacco Use     Smoking status: Never Smoker     Smokeless tobacco: Never Used   Vaping Use     Vaping Use: Never used   Substance Use Topics     Alcohol use: Yes     Comment: social     Drug use: No       Family History   I have reviewed this patient's family history and updated it with pertinent information if needed.  Family History   Problem Relation Age of Onset     Neurologic Disorder Father         MS     Heart Disease Father         irregular heart rate     Diabetes Father      Melanoma Father      Sleep Apnea Father      Other Cancer Father      Cancer Maternal Grandfather         lung     Other Cancer Maternal Grandfather      Cancer Paternal Grandmother         stomach     Other Cancer Paternal Grandmother      Cancer Mother      Other Cancer Mother      Thyroid Disease Mother      Gynecology Maternal Aunt         PCOS     Hypertension Maternal Grandmother      Anxiety Disorder Maternal Grandmother      Thyroid Disease Maternal Grandmother      Anxiety Disorder Sister        Prior to Admission Medications   Prior to Admission Medications   Prescriptions Last Dose Informant Patient Reported? Taking?   Cholecalciferol (VITAMIN D3 PO) 2/7/2022 at Unknown time Self Yes Yes   Sig: Take 10,000 Units by mouth daily    acetaminophen (TYLENOL) 325 MG tablet 2/8/2022 at Unknown time Self Yes Yes   Sig: Take 650 mg by mouth every 6 hours as needed for mild pain    desogestrel-ethinyl estradiol (APRI) 0.15-30 MG-MCG tablet 2/7/2022 at Unknown time  No Yes   Sig: TAKE 1 TABLET DAILY CONTINUOUSLY-OMIT PLACEBPO TABS UNTIL AFTER 3 MONTHS OF HORMONE TABS   gabapentin (NEURONTIN) 100 MG capsule  2/8/2022 at am  No Yes   Sig: TAKE 2 CAPSULES(200 MG) BY MOUTH THREE TIMES DAILY   lisinopril (ZESTRIL) 20 MG tablet 2/7/2022 at Unknown time  No Yes   Sig: Take 1 tablet (20 mg) by mouth daily   metoprolol succinate ER (TOPROL-XL) 25 MG 24 hr tablet 2/7/2022 at Unknown time  No Yes   Sig: TAKE 1 TABLET(25 MG) BY MOUTH DAILY   miconazole (MICATIN) 2 % external powder 2/7/2022 at Unknown time  No Yes   Sig: Apply topically 2 times daily   sertraline (ZOLOFT) 100 MG tablet 2/7/2022 at Unknown time  No Yes   Sig: Take 1 tablet (100 mg) by mouth daily      Facility-Administered Medications: None     Allergies   Allergies   Allergen Reactions     Nkda [No Known Drug Allergies]        Physical Exam   Vital Signs: Temp: (!) 100.7  F (38.2  C) Temp src: Axillary BP: 124/74 Pulse: 108   Resp: (!) 40 SpO2: 97 % O2 Device: Nasal cannula Oxygen Delivery: (S) 2 LPM (Titrating down from 3L to 2L)  Weight: 480 lbs 0 oz    Physical Exam:  Temp:  [100.1  F (37.8  C)-103.1  F (39.5  C)] 100.7  F (38.2  C)  Pulse:  [108-124] 108  Resp:  [22-41] 40  BP: (122-173)/() 124/74  SpO2:  [91 %-98 %] 97 %  /74   Pulse 108   Temp (!) 100.7  F (38.2  C) (Axillary)   Resp (!) 40   Wt (!) 217.7 kg (480 lb)   LMP 01/31/2022   SpO2 97%   BMI 85.03 kg/m    General appearance: alert, appears stated age and cooperative  Head: Normocephalic, without obvious abnormality, atraumatic  Eyes: Clear conjuctiva  Neck: Elevated JVD and supple, symmetrical, trachea midline  Lungs: Distant breath sounds, mild tachypnea, not using accessory muscles  Heart: regular rate and rhythm, S1, S2 normal, no murmur, click, rub or gallop  Abdomen: soft, non-tender; bowel sounds normal; no masses,  no organomegaly  Extremities: ankle jerks reduced  Skin: erythema in bilateral groin and thighs left greater than right as well as shins.  Weeping skin, small wound in right groin.  Neurologic: Mental status: Alert, oriented, thought content appropriate  Cranial  nerves: normal  Sensory: normal  Motor:Decreased right leg/hip flexion that is chronic  Pronator drift none          Data   Data reviewed today: I reviewed all medications, new labs and imaging results over the last 24 hours. I personally reviewed the chest CT image(s) showing Right infiltrate, no pneumothorax, small right PE.  EKG: Taken but cannot see on epic.  Telemetry monitor shows sinus tachycardia, regular rate. Repeat EKG rate 107, non specific T wave changes/inversions in V1-V3, I, aVL  Recent Labs   Lab 02/08/22  1620 02/08/22  1115   WBC 13.6* 15.3*   HGB 13.1 13.7   MCV 87 86    269   NA  --  134*   POTASSIUM  --  4.3   CHLORIDE  --  105   CO2  --  18*   BUN  --  16   CR  --  0.76   ANIONGAP  --  11   EVITA  --  9.5   GLC  --  116   ALBUMIN  --  3.1*   PROTTOTAL  --  7.3   BILITOTAL  --  2.3*   ALKPHOS  --  40*   ALT  --  12   AST  --  18     Most Recent 3 CBC's:  Recent Labs   Lab Test 02/08/22  1620 02/08/22  1115 03/08/21  0350 03/07/21  0600   WBC 13.6* 15.3*  --  7.3   HGB 13.1 13.7  --  12.5   MCV 87 86  --  87    269 223 203     13.6 (H)    \    13.1    /    240   N 86    L N/A    134 (L)    105    16 /   ------------------------------------ 116   ALT 12   AST 18   AP 40 (L)   ALB 3.1 (L)   Ca 9.5  4.3    18 (L)    0.76 \    % RETIC N/A    LDH N/A  Troponin N/A    BNP 2,230 (H)    CK N/A  INR N/A   PTT N/A    D-dimer N/A    Fibrinogen N/A    Antithrombin N/A  Ferritin N/A  CRP N/A    IL-6 N/A

## 2022-02-08 NOTE — PHARMACY-ADMISSION MEDICATION HISTORY
Pharmacy Note - Admission Medication History    Pertinent Provider Information: none     ______________________________________________________________________    Prior To Admission (PTA) med list completed and updated in EMR.       PTA Med List   Medication Sig Last Dose     acetaminophen (TYLENOL) 325 MG tablet Take 650 mg by mouth every 6 hours as needed for mild pain  2/8/2022 at Unknown time     Cholecalciferol (VITAMIN D3 PO) Take 10,000 Units by mouth daily  2/7/2022 at Unknown time     desogestrel-ethinyl estradiol (APRI) 0.15-30 MG-MCG tablet TAKE 1 TABLET DAILY CONTINUOUSLY-OMIT PLACEBPO TABS UNTIL AFTER 3 MONTHS OF HORMONE TABS 2/7/2022 at Unknown time     gabapentin (NEURONTIN) 100 MG capsule TAKE 2 CAPSULES(200 MG) BY MOUTH THREE TIMES DAILY 2/8/2022 at am     lisinopril (ZESTRIL) 20 MG tablet Take 1 tablet (20 mg) by mouth daily 2/7/2022 at Unknown time     metoprolol succinate ER (TOPROL-XL) 25 MG 24 hr tablet TAKE 1 TABLET(25 MG) BY MOUTH DAILY 2/7/2022 at Unknown time     miconazole (MICATIN) 2 % external powder Apply topically 2 times daily 2/7/2022 at Unknown time     sertraline (ZOLOFT) 100 MG tablet Take 1 tablet (100 mg) by mouth daily 2/7/2022 at Unknown time       Information source(s): Patient and [facility]  Method of interview communication: phone    Summary of Changes to PTA Med List  New:    Discontinued: ibuprofen  Changed:      Patient was asked about OTC/herbal products specifically.  PTA med list reflects this.    In the past week, patient estimated taking medication this percent of the time:  greater than 90%.    Allergies were reviewed, assessed, and updated with the patient.      Patient does not use any multi-dose medications prior to admission.    The information provided in this note is only as accurate as the sources available at the time of the update(s).    Thank you for the opportunity to participate in the care of this patient.    Agapito Brito RPH  2/8/2022  2:36 PM

## 2022-02-08 NOTE — ED TRIAGE NOTES
Patient arrives by [facility] medics for evaluation after a fall this am.  Patient reports she has had a fever since last night.  Patient took Tylenol this am at 0800.  Medics report patients oxygen saturation was in the mid 80's upon arrival.  Placed on 6L via nasal canula.  Patient has cellulitis on bilateral legs.

## 2022-02-08 NOTE — ED PROVIDER NOTES
EMERGENCY DEPARTMENT ENCOUNTER      NAME: Bess Enamorado  AGE: 46 year old female  YOB: 1975  MRN: [id number]  EVALUATION DATE & TIME: 2022 10:54 AM    PCP: Mikala Luna    ED PROVIDER: Ravin Guillen D.O.      Chief Complaint   Patient presents with     Fever       FINAL IMPRESSION:  1. COVID-19    2. Pneumonia due to infectious organism, unspecified laterality, unspecified part of lung    3. Acute pulmonary embolism without acute cor pulmonale, unspecified pulmonary embolism type (H)    4. Cellulitis, unspecified cellulitis site        ED COURSE & MEDICAL DECISION MAKIN:06 AM I met with the patient to gather history and to perform my initial exam. I discussed the plan for care while in the Emergency Department.  12:42 PM I rechecked and updated the patient, patient is Covid positive.  2:00 PM I rechecked and updated the patient   3:29 PM I spoke with radiology. Pneumonia vs atypical Covid. Tiny right upper lobe non-occlusive PE.         Pertinent Labs & Imaging studies reviewed. (See chart for details)  46 year old female presents to the Emergency Department for evaluation of fever and tachycardia.  She was also hypoxic, and had a positive Covid test today.  However she did also test positive for Covid over a month ago, and I am uncertain that this represents a new infection or if it is just residual positive test.  CT imaging does show a very small PE, but with her history and body habitus, we decided that would be appropriate for this patient be heparinized on a heparin drip.  Additionally there is evidence of pneumonia, and with the elevated white count, I am more concerned this represents true bacterial pneumonia over Covid pneumonia.  Additionally she also appears to have cellulitis of the lower extremities.  All this with her tachycardia and elevated white blood cell count, I do believe she will require admission for sepsis and IV antibiotics.  Discussed with the hospitalist who  agreed to the mission.    At the conclusion of the encounter I discussed the results of all of the tests and the disposition. The questions were answered. The patient or family acknowledged understanding and was agreeable with the care plan.      CRITICAL CARE:  Critical Care  Performed by: JULIA ORO  Authorized by: JULIA ORO  Total critical care time: 35 minutes  Critical care time was exclusive of separately billable procedures and treating other patients.  Critical care was necessary to treat or prevent imminent or life-threatening deterioration of the following conditions: sepsis, PE  Critical care was time spent personally by me on the following activities: development of treatment plan with patient or surrogate, discussions with consultants, examination of patient, evaluation of patient's response to treatment, obtaining history from patient or surrogate, ordering and performing treatments and interventions, ordering and review of laboratory studies, ordering and review of radiographic studies and re-evaluation of patient's condition, this excludes any separately billable procedures.          HPI    Patient information was obtained from: Patient    Use of : N/A        Bess Enamorado is a 46 year old female with a history of lymphedema of both lower extremities, MS, PCOS, and s/p cholecystectomy who presents with fever and leg edema.    Patient reports 2 days of fevers, both measured and subjective. Patient is also concerned of cellulitis in her leg. She reports her left leg has worsened lymphedema, which is hotter and more swollen than normal. Patient reports generalized weakness, and this morning she tried to get up to use the bathroom, she slid to the floor, denies falling. She denies any injuries or hitting her head. Patient reports she struggled to get up after sliding to the floor, she was unable to get up for an hour and a half.     Today, patient's only complaints are fever  and a headache. She notes some shortness of breath when walking, with associated cough occasionally, but this is not new. Patient denies tobacco or alcohol use. She denies any other current complaints.    REVIEW OF SYSTEMS  Constitutional:  Denies chills, weight loss. Positive for weakness and fever  Eyes:  No pain, discharge, redness  HENT:  Denies sore throat, ear pain, congestion  Respiratory: No wheeze. Positive for chronic cough and shortness of breath.  Cardiovascular:  No CP, palpitations. Positive for leg edema.  GI:  Denies abdominal pain, nausea, vomiting, diarrhea  : Denies dysuria, hematuria  Musculoskeletal:  Denies any new muscle/joint pain, swelling or loss of function.  Skin:  Denies rash, pallor  Neurologic:  Denies focal weakness or sensory changes. Positive for headache.  Lymph: Denies swollen nodes    All other systems negative unless noted in HPI.    PAST MEDICAL HISTORY:  Past Medical History:   Diagnosis Date     Arthritis 2019    Hips     ASCUS favor benign 8/2015    Neg HPV     Depressive disorder 2010     H/O colposcopy with cervical biopsy 9/14/15    Bx & ECC - negative     Hypertension 2019     LSIL on Pap smear 7/2014    Neg high risk HPV     Multiple sclerosis (H) 8/29/2005 9/18/200pt dx with MS 2004--dx by neurolgist and is followed by Dr. Steven Ayala 10/2016     UNSPEC CONSTIPATION 9/18/2006 9/18/2006   Has tried otc suppository and otc lax.        PAST SURGICAL HISTORY:  Past Surgical History:   Procedure Laterality Date     CHOLECYSTECTOMY, LAPOROSCOPIC      Cholecystectomy, Laparoscopic     COLONOSCOPY  2007?    Bathroom issue..results normal     OPEN REDUCTION INTERNAL FIXATION TOE(S)  4/13/2012    Procedure:OPEN REDUCTION INTERNAL FIXATION TOE(S); Open reduction internal fixation right proximal fifth metatarsal fracture-   Anes-choice block; Surgeon:LEY, JEFFREY DUANE; Location:[facility]         CURRENT MEDICATIONS:    Current Facility-Administered Medications    Medication     heparin infusion 25,000 units in D5W 250 mL ANTICOAGULANT     lactated ringers infusion     sodium chloride (PF) 0.9% PF flush 3 mL     sodium chloride (PF) 0.9% PF flush 3 mL     sodium chloride (PF) 0.9% PF flush 3 mL     sodium chloride (PF) 0.9% PF flush 3 mL     Current Outpatient Medications   Medication     acetaminophen (TYLENOL) 325 MG tablet     Cholecalciferol (VITAMIN D3 PO)     desogestrel-ethinyl estradiol (APRI) 0.15-30 MG-MCG tablet     gabapentin (NEURONTIN) 100 MG capsule     lisinopril (ZESTRIL) 20 MG tablet     metoprolol succinate ER (TOPROL-XL) 25 MG 24 hr tablet     miconazole (MICATIN) 2 % external powder     sertraline (ZOLOFT) 100 MG tablet         ALLERGIES:  Allergies   Allergen Reactions     Nkda [No Known Drug Allergies]        FAMILY HISTORY:  Family History   Problem Relation Age of Onset     Neurologic Disorder Father         MS     Heart Disease Father         irregular heart rate     Diabetes Father      Melanoma Father      Sleep Apnea Father      Other Cancer Father      Cancer Maternal Grandfather         lung     Other Cancer Maternal Grandfather      Cancer Paternal Grandmother         stomach     Other Cancer Paternal Grandmother      Cancer Mother      Other Cancer Mother      Thyroid Disease Mother      Gynecology Maternal Aunt         PCOS     Hypertension Maternal Grandmother      Anxiety Disorder Maternal Grandmother      Thyroid Disease Maternal Grandmother      Anxiety Disorder Sister        SOCIAL HISTORY:  Social History     Socioeconomic History     Marital status: Single     Spouse name: Not on file     Number of children: Not on file     Years of education: Not on file     Highest education level: Not on file   Occupational History     Employer: [employer]   Tobacco Use     Smoking status: Never Smoker     Smokeless tobacco: Never Used   Vaping Use     Vaping Use: Never used   Substance and Sexual Activity     Alcohol use: Yes     Comment:  social     Drug use: No     Sexual activity: Not Currently     Partners: Male     Birth control/protection: Pill   Other Topics Concern     Parent/sibling w/ CABG, MI or angioplasty before 65F 55M? No   Social History Narrative    , 3rd-5th grade     Social Determinants of Health     Financial Resource Strain: Low Risk      Difficulty of Paying Living Expenses: Not hard at all   Food Insecurity: No Food Insecurity     Worried About Running Out of Food in the Last Year: Never true     Ran Out of Food in the Last Year: Never true   Transportation Needs: No Transportation Needs     Lack of Transportation (Medical): No     Lack of Transportation (Non-Medical): No   Physical Activity: Not on file   Stress: Not on file   Social Connections: Not on file   Intimate Partner Violence: Not At Risk     Fear of Current or Ex-Partner: No     Emotionally Abused: No     Physically Abused: No     Sexually Abused: No   Housing Stability: Not on file       VITALS:  Patient Vitals for the past 24 hrs:   BP Temp Temp src Pulse Resp SpO2 Weight   02/08/22 1615 (!) 164/78 -- -- 114 26 96 % --   02/08/22 1600 (!) 159/72 -- -- 114 27 95 % --   02/08/22 1545 (!) 150/74 -- -- 115 29 95 % --   02/08/22 1530 (!) 153/87 -- -- 114 29 97 % --   02/08/22 1515 (!) 151/82 -- -- 113 (!) 32 95 % --   02/08/22 1430 125/66 -- -- 118 28 94 % --   02/08/22 1400 136/82 -- -- 118 27 96 % --   02/08/22 1345 139/66 -- -- 116 25 96 % --   02/08/22 1330 (!) 148/79 -- -- 118 26 96 % --   02/08/22 1315 (!) 144/72 -- -- 116 23 97 % --   02/08/22 1245 (!) 156/77 -- -- 115 22 98 % --   02/08/22 1215 (!) 149/78 -- -- 115 25 98 % --   02/08/22 1200 (!) 149/81 -- -- 115 25 98 % --   02/08/22 1059 122/73 (!) 100.6  F (38.1  C) Oral (!) 124 26 94 % (!) 217.7 kg (480 lb)       PHYSICAL EXAM    VITAL SIGNS: BP (!) 164/78   Pulse 114   Temp (!) 100.6  F (38.1  C) (Oral)   Resp 26   Wt (!) 217.7 kg (480 lb)   LMP 01/31/2022   SpO2 96%   BMI  85.03 kg/m      General Appearance: Well-appearing, well-nourished. Obese, no acute distress.   Head:  Normocephalic, without obvious abnormality, atraumatic  Eyes:  PERRL, conjunctiva/corneas clear, EOM's intact,  ENT:  Lips, mucosa, and tongue normal, membranes are moist without pallor  Neck:  Normal ROM, symmetrical, trachea midline    Cardio:  Regular rhythm, no murmur, rub or gallop, 2+ pulses symmetric in all extremities. Tachycardic.  Pulm:  Clear to auscultation bilaterally, respirations unlabored,  Abdomen:  Soft, non-tender, no rebound or guarding.  Musculoskeletal: Full ROM, no edema, no cyanosis, good ROM of major joints. 4+ edema bilateral extremities with chronic changes.  Integument:  Warm, Dry, No rash.  Erythematic and warm to touch, left thigh greater than right.  Neurologic:  Alert & oriented.  No focal deficits appreciated.  Ambulatory.  Psychiatric:  Affect normal, Judgment normal, Mood normal.        LABS  Results for orders placed or performed during the hospital encounter of 02/08/22 (from the past 24 hour(s))   Comprehensive metabolic panel   Result Value Ref Range    Sodium 134 (L) 136 - 145 mmol/L    Potassium 4.3 3.5 - 5.0 mmol/L    Chloride 105 98 - 107 mmol/L    Carbon Dioxide (CO2) 18 (L) 22 - 31 mmol/L    Anion Gap 11 5 - 18 mmol/L    Urea Nitrogen 16 8 - 22 mg/dL    Creatinine 0.76 0.60 - 1.10 mg/dL    Calcium 9.5 8.5 - 10.5 mg/dL    Glucose 116 70 - 125 mg/dL    Alkaline Phosphatase 40 (L) 45 - 120 U/L    AST 18 0 - 40 U/L    ALT 12 0 - 45 U/L    Protein Total 7.3 6.0 - 8.0 g/dL    Albumin 3.1 (L) 3.5 - 5.0 g/dL    Bilirubin Total 2.3 (H) 0.0 - 1.0 mg/dL    GFR Estimate >90 >60 mL/min/1.73m2   CBC with platelets differential    Narrative    The following orders were created for panel order CBC with platelets differential.  Procedure                               Abnormality         Status                     ---------                               -----------         ------                      CBC with platelets and d...[[id number]]  Abnormal            Final result                 Please view results for these tests on the individual orders.   Lactic acid whole blood STAT   Result Value Ref Range    Lactic Acid 1.4 0.7 - 2.0 mmol/L   CBC with platelets and differential   Result Value Ref Range    WBC Count 15.3 (H) 4.0 - 11.0 10e3/uL    RBC Count 5.02 3.80 - 5.20 10e6/uL    Hemoglobin 13.7 11.7 - 15.7 g/dL    Hematocrit 43.1 35.0 - 47.0 %    MCV 86 78 - 100 fL    MCH 27.3 26.5 - 33.0 pg    MCHC 31.8 31.5 - 36.5 g/dL    RDW 15.4 (H) 10.0 - 15.0 %    Platelet Count 269 150 - 450 10e3/uL    % Neutrophils 86 %    % Lymphocytes 8 %    % Monocytes 5 %    % Eosinophils 0 %    % Basophils 0 %    % Immature Granulocytes 1 %    NRBCs per 100 WBC 0 <1 /100    Absolute Neutrophils 13.2 (H) 1.6 - 8.3 10e3/uL    Absolute Lymphocytes 1.3 0.8 - 5.3 10e3/uL    Absolute Monocytes 0.8 0.0 - 1.3 10e3/uL    Absolute Eosinophils 0.0 0.0 - 0.7 10e3/uL    Absolute Basophils 0.1 0.0 - 0.2 10e3/uL    Absolute Immature Granulocytes 0.1 <=0.4 10e3/uL    Absolute NRBCs 0.0 10e3/uL   Blood gas venous   Result Value Ref Range    pH Venous 7.45 7.35 - 7.45    pCO2 Venous 33 (L) 35 - 50 mm Hg    pO2 Venous 55 (H) 25 - 47 mm Hg    Bicarbonate Venous 24 24 - 30 mmol/L    Base Excess/Deficit (+/-) -0.9   mmol/L    Oxyhemoglobin Venous 88.2 (H) 70.0 - 75.0 %    O2 Sat, Venous 89.7 (H) 70.0 - 75.0 %   [facility] Draw    Narrative    The following orders were created for panel order [facility].  Procedure                               Abnormality         Status                     ---------                               -----------         ------                     Extra Blue Top Tube[[id number]]                              Final result               Extra Red Top Tube[[id number]]                               Final result                 Please view results for these tests on the individual orders.   Extra Blue Top Tube   Result  Value Ref Range    Hold Specimen JIC    Extra Red Top Tube   Result Value Ref Range    Hold Specimen JIC    Symptomatic; Unknown Influenza A/B & SARS-CoV2 (COVID-19) Virus PCR Multiplex Nasopharyngeal    Specimen: Nasopharyngeal; Swab   Result Value Ref Range    Influenza A PCR Negative Negative    Influenza B PCR Negative Negative    SARS CoV2 PCR Positive (A) Negative    Narrative    Testing was performed using the ariana SARS-CoV-2 & Influenza A/B Assay on the ariana Rossana System. This test should be ordered for the detection of SARS-CoV-2 and influenza viruses in individuals who meet clinical and/or epidemiological criteria. Test performance is unknown in asymptomatic patients. This test is for in vitro diagnostic use under the FDA EUA for laboratories certified under CLIA to perform moderate and/or high complexity testing. This test has not been FDA cleared or approved. A negative result does not rule out the presence of PCR inhibitors in the specimen or target RNA in concentration below the limit of detection for the assay. If only one viral target is positive but coinfection with multiple targets is suspected, the sample should be re-tested with another FDA cleared, approved or authorized test, if coinfection would change clinical management. [facility] are certified under the Clinical Laboratory Improvement Amendments of 1988 (CLIA-88) as  qualified to perform moderate and/or high complexity laboratory testing.   CT Chest Pulmonary Embolism w Contrast    Narrative    EXAM: CT CHEST PULMONARY EMBOLISM W CONTRAST  LOCATION: [facility]  DATE/TIME: 2/8/2022 3:03 PM    INDICATION: COVID. Dyspnea. Fever. Weakness.  COMPARISON: 03/05/2021 CT  TECHNIQUE: CT chest pulmonary angiogram during arterial phase injection of IV contrast. Multiplanar reformats and MIP reconstructions were performed. Dose reduction techniques were used.   CONTRAST: isovue 370  100ml    FINDINGS:  ANGIOGRAM CHEST: Enlarged main pulmonary artery is 4 cm diameter consistent with pulmonary arterial hypertension. Small nonocclusive thrombus right upper lobe pulmonary artery (series 5, image 66). No additional thrombi seen.    Thoracic aorta is negative for dissection. RV/LV ratio abnormal, greater than 1.0.    LUNGS AND PLEURA: Diffuse lung mosaic attenuation, similar to previous which can be seen with pulmonary arterial hypertension. No right upper and middle lobe groundglass/consolidative infiltrates suggesting an acute pneumonia. No pleural effusion.    MEDIASTINUM/AXILLAE: No adenopathy.    CORONARY ARTERY CALCIFICATION: None.    UPPER ABDOMEN: Absent gallbladder    MUSCULOSKELETAL: Upper chest wall venous collaterals. Spinal degenerative change.      Impression    IMPRESSION:  1.  Small nonocclusive right upper lobe pulmonary embolism.  2.  Pulmonary arterial hypertension.  3.  Right heart dysfunction.  4.  New right lung infiltrates compatible with pneumonia.  5.  Report called to Dr. Guillen at 3:30 PM.         RADIOLOGY  CT Chest Pulmonary Embolism w Contrast   Final Result   IMPRESSION:   1.  Small nonocclusive right upper lobe pulmonary embolism.   2.  Pulmonary arterial hypertension.   3.  Right heart dysfunction.   4.  New right lung infiltrates compatible with pneumonia.   5.  Report called to Dr. Guillen at 3:30 PM.      US Lower Extremity Venous Duplex Bilateral    (Results Pending)          MEDICATIONS GIVEN IN THE EMERGENCY:  Medications   sodium chloride (PF) 0.9% PF flush 3 mL (has no administration in time range)   sodium chloride (PF) 0.9% PF flush 3 mL (3 mLs Intravenous Not Given 2/8/22 1232)   sodium chloride (PF) 0.9% PF flush 3 mL (has no administration in time range)   sodium chloride (PF) 0.9% PF flush 3 mL (3 mLs Intracatheter Not Given 2/8/22 1233)   lactated ringers BOLUS 1,000 mL (0 mLs Intravenous Stopped 2/8/22 1301)     Followed by   lactated ringers infusion (  Intravenous Rate/Dose Verify 2/8/22 1622)   heparin infusion 25,000 units in D5W 250 mL ANTICOAGULANT (1,800 Units/hr Intravenous New Bag 2/8/22 1623)   piperacillin-tazobactam (ZOSYN) 3.375 g vial to attach to  mL bag (0 g Intravenous Stopped 2/8/22 1220)   vancomycin (VANCOCIN) 2,500 mg in sodium chloride 0.9 % 500 mL intermittent infusion (0 mg Intravenous Stopped 2/8/22 1521)   iopamidol (ISOVUE-370) solution 100 mL (100 mLs Intravenous Given 2/8/22 1504)   heparin ANTICOAGULANT loading dose for  HIGH INTENSITY TREATMENT* Give BEFORE starting heparin infusion (8,000 Units Intravenous Given 2/8/22 1623)       NEW PRESCRIPTIONS STARTED AT TODAY'S ER VISIT  New Prescriptions    No medications on file        Ravin Guillen D.O.  Emergency Medicine  [facility] EMERGENCY DEPARTMENT  [facility] [facility] [id number] [phone]  Dept: [phone]      Ravin Guillen DO  02/08/22 [id number]

## 2022-02-09 ENCOUNTER — APPOINTMENT (OUTPATIENT)
Dept: SPEECH THERAPY | Facility: HOSPITAL | Age: 47
DRG: 871 | End: 2022-02-09
Attending: FAMILY MEDICINE
Payer: COMMERCIAL

## 2022-02-09 ENCOUNTER — APPOINTMENT (OUTPATIENT)
Dept: CARDIOLOGY | Facility: HOSPITAL | Age: 47
DRG: 871 | End: 2022-02-09
Attending: FAMILY MEDICINE
Payer: COMMERCIAL

## 2022-02-09 ENCOUNTER — APPOINTMENT (OUTPATIENT)
Dept: CT IMAGING | Facility: HOSPITAL | Age: 47
DRG: 871 | End: 2022-02-09
Attending: FAMILY MEDICINE
Payer: COMMERCIAL

## 2022-02-09 PROBLEM — I21.4 NSTEMI (NON-ST ELEVATED MYOCARDIAL INFARCTION) (H): Status: ACTIVE | Noted: 2022-02-09

## 2022-02-09 PROBLEM — I50.33 ACUTE ON CHRONIC DIASTOLIC CONGESTIVE HEART FAILURE (H): Status: ACTIVE | Noted: 2022-02-09

## 2022-02-09 PROBLEM — G47.33 OSA (OBSTRUCTIVE SLEEP APNEA): Chronic | Status: ACTIVE | Noted: 2017-08-22

## 2022-02-09 PROBLEM — J96.01 ACUTE RESPIRATORY FAILURE WITH HYPOXIA (H): Status: ACTIVE | Noted: 2022-02-09

## 2022-02-09 LAB
ANION GAP SERPL CALCULATED.3IONS-SCNC: 11 MMOL/L (ref 5–18)
ATRIAL RATE - MUSE: 103 BPM
BUN SERPL-MCNC: 13 MG/DL (ref 8–22)
C DIFF TOX B STL QL: NEGATIVE
C REACTIVE PROTEIN LHE: 21.8 MG/DL (ref 0–0.8)
CALCIUM SERPL-MCNC: 9 MG/DL (ref 8.5–10.5)
CHLORIDE BLD-SCNC: 104 MMOL/L (ref 98–107)
CO2 SERPL-SCNC: 21 MMOL/L (ref 22–31)
CREAT SERPL-MCNC: 0.82 MG/DL (ref 0.6–1.1)
D DIMER PPP FEU-MCNC: 1.91 UG/ML FEU (ref 0–0.5)
DIASTOLIC BLOOD PRESSURE - MUSE: NORMAL MMHG
ENTEROCOCCUS FAECALIS: NOT DETECTED
ENTEROCOCCUS FAECIUM: NOT DETECTED
ERYTHROCYTE [DISTWIDTH] IN BLOOD BY AUTOMATED COUNT: 15.9 % (ref 10–15)
GFR SERPL CREATININE-BSD FRML MDRD: 88 ML/MIN/1.73M2
GLUCOSE BLD-MCNC: 116 MG/DL (ref 70–125)
HCT VFR BLD AUTO: 41.5 % (ref 35–47)
HGB BLD-MCNC: 12.7 G/DL (ref 11.7–15.7)
INTERPRETATION ECG - MUSE: NORMAL
LISTERIA SPECIES (DETECTED/NOT DETECTED): NOT DETECTED
LVEF ECHO: NORMAL
MAGNESIUM SERPL-MCNC: 1.6 MG/DL (ref 1.8–2.6)
MAGNESIUM SERPL-MCNC: 2.3 MG/DL (ref 1.8–2.6)
MCH RBC QN AUTO: 27 PG (ref 26.5–33)
MCHC RBC AUTO-ENTMCNC: 30.6 G/DL (ref 31.5–36.5)
MCV RBC AUTO: 88 FL (ref 78–100)
P AXIS - MUSE: 74 DEGREES
PLATELET # BLD AUTO: 212 10E3/UL (ref 150–450)
POTASSIUM BLD-SCNC: 3.8 MMOL/L (ref 3.5–5)
PR INTERVAL - MUSE: 162 MS
QRS DURATION - MUSE: 88 MS
QT - MUSE: 306 MS
QTC - MUSE: 400 MS
R AXIS - MUSE: 50 DEGREES
RBC # BLD AUTO: 4.7 10E6/UL (ref 3.8–5.2)
SODIUM SERPL-SCNC: 136 MMOL/L (ref 136–145)
STAPHYLOCOCCUS AUREUS: NOT DETECTED
STAPHYLOCOCCUS EPIDERMIDIS: DETECTED
STAPHYLOCOCCUS LUGDUNENSIS: NOT DETECTED
STREPTOCOCCUS AGALACTIAE: NOT DETECTED
STREPTOCOCCUS ANGINOSUS GROUP: NOT DETECTED
STREPTOCOCCUS PNEUMONIAE: NOT DETECTED
STREPTOCOCCUS PYOGENES: NOT DETECTED
STREPTOCOCCUS SPECIES: NOT DETECTED
SYSTOLIC BLOOD PRESSURE - MUSE: NORMAL MMHG
T AXIS - MUSE: 129 DEGREES
TROPONIN I SERPL-MCNC: 0.33 NG/ML (ref 0–0.29)
TROPONIN I SERPL-MCNC: 0.62 NG/ML (ref 0–0.29)
UFH PPP CHRO-ACNC: 0.13 IU/ML
UFH PPP CHRO-ACNC: 0.18 IU/ML
VENTRICULAR RATE- MUSE: 103 BPM
WBC # BLD AUTO: 10 10E3/UL (ref 4–11)

## 2022-02-09 PROCEDURE — 84484 ASSAY OF TROPONIN QUANT: CPT | Performed by: FAMILY MEDICINE

## 2022-02-09 PROCEDURE — 99223 1ST HOSP IP/OBS HIGH 75: CPT | Performed by: INTERNAL MEDICINE

## 2022-02-09 PROCEDURE — 210N000001 HC R&B IMCU HEART CARE

## 2022-02-09 PROCEDURE — 250N000009 HC RX 250: Performed by: INTERNAL MEDICINE

## 2022-02-09 PROCEDURE — 250N000013 HC RX MED GY IP 250 OP 250 PS 637: Performed by: FAMILY MEDICINE

## 2022-02-09 PROCEDURE — 258N000003 HC RX IP 258 OP 636: Performed by: INTERNAL MEDICINE

## 2022-02-09 PROCEDURE — 87493 C DIFF AMPLIFIED PROBE: CPT | Performed by: FAMILY MEDICINE

## 2022-02-09 PROCEDURE — 36415 COLL VENOUS BLD VENIPUNCTURE: CPT | Performed by: FAMILY MEDICINE

## 2022-02-09 PROCEDURE — 999N000208 ECHOCARDIOGRAM COMPLETE

## 2022-02-09 PROCEDURE — 258N000003 HC RX IP 258 OP 636: Performed by: EMERGENCY MEDICINE

## 2022-02-09 PROCEDURE — 92610 EVALUATE SWALLOWING FUNCTION: CPT | Mod: GN

## 2022-02-09 PROCEDURE — 258N000003 HC RX IP 258 OP 636: Performed by: FAMILY MEDICINE

## 2022-02-09 PROCEDURE — 94660 CPAP INITIATION&MGMT: CPT

## 2022-02-09 PROCEDURE — 99233 SBSQ HOSP IP/OBS HIGH 50: CPT | Performed by: INTERNAL MEDICINE

## 2022-02-09 PROCEDURE — 85379 FIBRIN DEGRADATION QUANT: CPT | Performed by: FAMILY MEDICINE

## 2022-02-09 PROCEDURE — 96375 TX/PRO/DX INJ NEW DRUG ADDON: CPT

## 2022-02-09 PROCEDURE — 70450 CT HEAD/BRAIN W/O DYE: CPT

## 2022-02-09 PROCEDURE — 96376 TX/PRO/DX INJ SAME DRUG ADON: CPT

## 2022-02-09 PROCEDURE — 250N000011 HC RX IP 250 OP 636: Performed by: FAMILY MEDICINE

## 2022-02-09 PROCEDURE — 999N000185 HC STATISTIC TRANSPORT TIME EA 15 MIN

## 2022-02-09 PROCEDURE — 80048 BASIC METABOLIC PNL TOTAL CA: CPT | Performed by: FAMILY MEDICINE

## 2022-02-09 PROCEDURE — 85520 HEPARIN ASSAY: CPT | Performed by: FAMILY MEDICINE

## 2022-02-09 PROCEDURE — 999N000157 HC STATISTIC RCP TIME EA 10 MIN

## 2022-02-09 PROCEDURE — 250N000011 HC RX IP 250 OP 636: Performed by: EMERGENCY MEDICINE

## 2022-02-09 PROCEDURE — 250N000011 HC RX IP 250 OP 636: Performed by: INTERNAL MEDICINE

## 2022-02-09 PROCEDURE — 93306 TTE W/DOPPLER COMPLETE: CPT | Mod: 26 | Performed by: INTERNAL MEDICINE

## 2022-02-09 PROCEDURE — 83735 ASSAY OF MAGNESIUM: CPT | Performed by: FAMILY MEDICINE

## 2022-02-09 PROCEDURE — 86140 C-REACTIVE PROTEIN: CPT | Performed by: FAMILY MEDICINE

## 2022-02-09 PROCEDURE — G0463 HOSPITAL OUTPT CLINIC VISIT: HCPCS

## 2022-02-09 PROCEDURE — 85027 COMPLETE CBC AUTOMATED: CPT | Performed by: FAMILY MEDICINE

## 2022-02-09 PROCEDURE — 255N000002 HC RX 255 OP 636: Performed by: INTERNAL MEDICINE

## 2022-02-09 RX ORDER — OXYMETAZOLINE HYDROCHLORIDE 0.05 G/100ML
2 SPRAY NASAL 2 TIMES DAILY PRN
Status: DISCONTINUED | OUTPATIENT
Start: 2022-02-09 | End: 2022-02-18 | Stop reason: HOSPADM

## 2022-02-09 RX ORDER — LISINOPRIL 5 MG/1
10 TABLET ORAL DAILY
Status: DISCONTINUED | OUTPATIENT
Start: 2022-02-10 | End: 2022-02-18 | Stop reason: HOSPADM

## 2022-02-09 RX ORDER — FUROSEMIDE 10 MG/ML
80 INJECTION INTRAMUSCULAR; INTRAVENOUS ONCE
Status: COMPLETED | OUTPATIENT
Start: 2022-02-09 | End: 2022-02-09

## 2022-02-09 RX ORDER — OXYMETAZOLINE HYDROCHLORIDE 0.05 G/100ML
2 SPRAY NASAL ONCE
Status: COMPLETED | OUTPATIENT
Start: 2022-02-09 | End: 2022-02-09

## 2022-02-09 RX ORDER — MAGNESIUM SULFATE 4 G/50ML
4 INJECTION INTRAVENOUS ONCE
Status: COMPLETED | OUTPATIENT
Start: 2022-02-09 | End: 2022-02-09

## 2022-02-09 RX ADMIN — MAGNESIUM SULFATE HEPTAHYDRATE 4 G: 80 INJECTION, SOLUTION INTRAVENOUS at 02:14

## 2022-02-09 RX ADMIN — PIPERACILLIN SODIUM AND TAZOBACTAM SODIUM 3.38 G: 3; .375 INJECTION, POWDER, LYOPHILIZED, FOR SOLUTION INTRAVENOUS at 09:00

## 2022-02-09 RX ADMIN — MICONAZOLE NITRATE: 20 POWDER TOPICAL at 08:43

## 2022-02-09 RX ADMIN — SERTRALINE HYDROCHLORIDE 100 MG: 50 TABLET ORAL at 08:42

## 2022-02-09 RX ADMIN — ACETAMINOPHEN 650 MG: 325 TABLET ORAL at 08:43

## 2022-02-09 RX ADMIN — PIPERACILLIN SODIUM AND TAZOBACTAM SODIUM 3.38 G: 3; .375 INJECTION, POWDER, LYOPHILIZED, FOR SOLUTION INTRAVENOUS at 18:45

## 2022-02-09 RX ADMIN — FUROSEMIDE 10 MG/HR: 10 INJECTION, SOLUTION INTRAVENOUS at 12:49

## 2022-02-09 RX ADMIN — HEPARIN SODIUM 2650 UNITS/HR: 1000 INJECTION, SOLUTION INTRAVENOUS; SUBCUTANEOUS at 13:56

## 2022-02-09 RX ADMIN — FUROSEMIDE 40 MG: 10 INJECTION, SOLUTION INTRAMUSCULAR; INTRAVENOUS at 02:13

## 2022-02-09 RX ADMIN — REMDESIVIR 200 MG: 100 INJECTION, POWDER, LYOPHILIZED, FOR SOLUTION INTRAVENOUS at 17:35

## 2022-02-09 RX ADMIN — PIPERACILLIN SODIUM AND TAZOBACTAM SODIUM 3.38 G: 3; .375 INJECTION, POWDER, LYOPHILIZED, FOR SOLUTION INTRAVENOUS at 02:15

## 2022-02-09 RX ADMIN — FUROSEMIDE 10 MG/HR: 10 INJECTION, SOLUTION INTRAVENOUS at 23:07

## 2022-02-09 RX ADMIN — MICONAZOLE NITRATE: 20 POWDER TOPICAL at 19:05

## 2022-02-09 RX ADMIN — LISINOPRIL 20 MG: 20 TABLET ORAL at 08:42

## 2022-02-09 RX ADMIN — VANCOMYCIN HYDROCHLORIDE 1500 MG: 5 INJECTION, POWDER, LYOPHILIZED, FOR SOLUTION INTRAVENOUS at 11:55

## 2022-02-09 RX ADMIN — HEPARIN SODIUM 2200 UNITS/HR: 1000 INJECTION, SOLUTION INTRAVENOUS; SUBCUTANEOUS at 00:19

## 2022-02-09 RX ADMIN — PERFLUTREN 3 ML: 6.52 INJECTION, SUSPENSION INTRAVENOUS at 09:28

## 2022-02-09 RX ADMIN — GABAPENTIN 200 MG: 100 CAPSULE ORAL at 20:48

## 2022-02-09 RX ADMIN — HEPARIN SODIUM 2200 UNITS/HR: 1000 INJECTION, SOLUTION INTRAVENOUS; SUBCUTANEOUS at 06:12

## 2022-02-09 RX ADMIN — METOPROLOL SUCCINATE 25 MG: 25 TABLET, EXTENDED RELEASE ORAL at 08:42

## 2022-02-09 RX ADMIN — OXYMETAZOLINE HYDROCHLORIDE 2 SPRAY: 0.5 SPRAY NASAL at 18:33

## 2022-02-09 RX ADMIN — FUROSEMIDE 40 MG: 10 INJECTION, SOLUTION INTRAMUSCULAR; INTRAVENOUS at 11:56

## 2022-02-09 RX ADMIN — GABAPENTIN 200 MG: 100 CAPSULE ORAL at 08:42

## 2022-02-09 RX ADMIN — GABAPENTIN 200 MG: 100 CAPSULE ORAL at 13:00

## 2022-02-09 RX ADMIN — FUROSEMIDE 40 MG: 10 INJECTION, SOLUTION INTRAMUSCULAR; INTRAVENOUS at 12:56

## 2022-02-09 RX ADMIN — HEPARIN SODIUM 2200 UNITS/HR: 1000 INJECTION, SOLUTION INTRAVENOUS; SUBCUTANEOUS at 01:12

## 2022-02-09 RX ADMIN — SODIUM CHLORIDE 50 ML: 9 INJECTION, SOLUTION INTRAVENOUS at 18:15

## 2022-02-09 ASSESSMENT — ACTIVITIES OF DAILY LIVING (ADL)
ADLS_ACUITY_SCORE: 23
ADLS_ACUITY_SCORE: 29
ADLS_ACUITY_SCORE: 23
ADLS_ACUITY_SCORE: 9
WALKING_OR_CLIMBING_STAIRS_DIFFICULTY: YES
ADLS_ACUITY_SCORE: 23
ADLS_ACUITY_SCORE: 23
ADLS_ACUITY_SCORE: 9
WHICH_OF_THE_ABOVE_FUNCTIONAL_RISKS_HAD_A_RECENT_ONSET_OR_CHANGE?: AMBULATION;FALL HISTORY
WALKING_OR_CLIMBING_STAIRS: AMBULATION DIFFICULTY, REQUIRES EQUIPMENT
ADLS_ACUITY_SCORE: 9
ADLS_ACUITY_SCORE: 23
ADLS_ACUITY_SCORE: 11
ADLS_ACUITY_SCORE: 23
TOILETING_ISSUES: YES
CONCENTRATING,_REMEMBERING_OR_MAKING_DECISIONS_DIFFICULTY: NO
ADLS_ACUITY_SCORE: 23
DRESSING/BATHING_DIFFICULTY: YES
DIFFICULTY_EATING/SWALLOWING: NO
ADLS_ACUITY_SCORE: 29
ADLS_ACUITY_SCORE: 31
HEARING_DIFFICULTY_OR_DEAF: NO
ADLS_ACUITY_SCORE: 29
ADLS_ACUITY_SCORE: 9
WEAR_GLASSES_OR_BLIND: NO
NUMBER_OF_TIMES_PATIENT_HAS_FALLEN_WITHIN_LAST_SIX_MONTHS: 1
DOING_ERRANDS_INDEPENDENTLY_DIFFICULTY: YES
ADLS_ACUITY_SCORE: 23
TOILETING_ASSISTANCE: TOILETING DIFFICULTY, DEPENDENT
ADLS_ACUITY_SCORE: 31
FALL_HISTORY_WITHIN_LAST_SIX_MONTHS: YES
DIFFICULTY_COMMUNICATING: NO
ADLS_ACUITY_SCORE: 23
ADLS_ACUITY_SCORE: 9
ADLS_ACUITY_SCORE: 23
DRESSING/BATHING: BATHING DIFFICULTY, ASSISTANCE 1 PERSON

## 2022-02-09 NOTE — PLAN OF CARE
Heparin drip paused for now as discussed with Dr Knight d/t pt is bleeding from nose and mouth from blowing the nose. Manual pressure applied on nose.

## 2022-02-09 NOTE — ED NOTES
[facility] ED Handoff Report    ED Chief Complaint: fever and fall    ED Diagnosis:  (U07.1) COVID-19  Comment: covid recovered, cellulitis, PE  Plan: care    (J18.9) Pneumonia due to infectious organism, unspecified laterality, unspecified part of lung  Comment:   Plan: blood thinners, antibiotics    (I26.99) Acute pulmonary embolism without acute cor pulmonale, unspecified pulmonary embolism type (H)  Comment: heparin drip at 2200units/hr  Plan: titrate with ANTI XA results every6 hrs    (L03.90) Cellulitis, unspecified cellulitis site  Comment:   Plan:        PMH:    Past Medical History:   Diagnosis Date     Acute on chronic diastolic congestive heart failure (H) 2/9/2022     Arthritis 2019    Hips     ASCUS favor benign 8/2015    Neg HPV     Depressive disorder 2010     H/O colposcopy with cervical biopsy 9/14/15    Bx & ECC - negative     Hypertension 2019     LSIL on Pap smear 7/2014    Neg high risk HPV     Multiple sclerosis (H) 8/29/2005 9/18/200pt dx with MS 2004--dx by neurolgist and is followed by Dr. Steven Ayala 10/2016     UNSPEC CONSTIPATION 9/18/2006 9/18/2006   Has tried otc suppository and otc lax.         Code Status:  Full Code     Falls Risk: Yes Band: Applied    Current Living Situation/Residence: lives alone     Elimination Status: Continent: No     Activity Level: Total assist/lift    Patients Preferred Language:  English     Needed: No    Vital Signs:  /61   Pulse 97   Temp (!) 100.7  F (38.2  C) (Axillary)   Resp 21   Wt (!) 217.7 kg (480 lb)   LMP 01/31/2022   SpO2 96%   BMI 85.03 kg/m       Cardiac Rhythm: NSR    Pain Score: 3/10    Is the Patient Confused:  No    Last Food or Drink: 02/09/22 at     Focused Assessment: skin, resp    Tests Performed: Done: Labs and Imaging    Treatments Provided:      Family Dynamics/Concerns: No    Family Updated On Visitor Policy: No    Plan of Care Communicated to Family: No    Who Was Updated about Plan  of Care: patient    Belongings Checklist Done and Signed by Patient: Yes    Medications sent with patient: yes    Covid: symptomatic, positive    Additional Information:     RN: Rafaela Okeefe   2/9/2022 3:39 AM

## 2022-02-09 NOTE — CONSULTS
HEART CARE NOTE        Thank you, Dr. HA, for asking the [facility] team to see Bess Enamorado to evaluate ADHF.      Assessment/Recommendations     1. HFpEF c/b ADHF  Assessment / Plan  Echo significant for RV dysfunction in the  of PE  Diuresing well on current regimen; no changes at time  GDMT pending echo results     3. Elevated troponin  Assessment / Plan  Mildly elevated in the setting of ADHF, COVID-19 infection and PE and likely associated RV strain; now trending down  Will hold off on further ischemic evaluation at this time; can consider stress testing once COVID-19 infection has been treated    2. COVID-19 infection  Assessment / Plan  Management primary team    Clinically Significant Risk Factors Present on Admission                     Fluid & Electrolyte Disorders: hyponatremia and Hypo-osmolality, Fluid overload, unspecified, Other fluid overload and Other disorders of electrolyte and fluid balance, not elsewhere classified    Start Time: 8:31 am  End Time: 9:00 am    History of Present Illness/Subjective    MsSohan Enamorado is a 46 year old female with a PMHx significant for with a recent COVID infection on 12/6/2021 who received monoclonal antibody, MS, chronic lymphedema, hypertension, depression, obstructive sleep apnea, PCOS, MS, admitted for fever, headache, increased leg swelling and cough found to have pneumonia, repeat positive Covid PCR, cellulitis, acute hypoxic respiratory failure and suspected CHF with a small PE.    Observed through doorway    ECG: Personally reviewed. sinus tachycardia.    ECHO:   1.Left ventricular size, wall motion and function are normal. The ejection  fraction is > 65%.  2.Moderately decreased right ventricular systolic function  3.No hemodynamically significant valvular abnormalities on 2D or color flow  imaging.  4.Technically difficult, suboptimal study due to patient size.  There is no comparison study available.    CT chest pulmonary:  IMPRESSION:  1.   "Small nonocclusive right upper lobe pulmonary embolism.  2.  Pulmonary arterial hypertension.  3.  Right heart dysfunction.  4.  New right lung infiltrates compatible with pneumonia.        Physical Examination   /66 (BP Location: Right arm)   Pulse 96   Temp 98.2  F (36.8  C) (Axillary)   Resp 24   Ht 1.626 m (5' 4\")   Wt (!) 217.7 kg (480 lb)   LMP 01/31/2022   SpO2 96%   BMI 82.39 kg/m    Body mass index is 82.39 kg/m .  Wt Readings from Last 3 Encounters:   02/08/22 (!) 217.7 kg (480 lb)   05/25/21 (!) 181.4 kg (400 lb)   03/05/21 (!) 198 kg (436 lb 8.2 oz)     Given that patient is COVD-19 positive, physical exam and ROS have been deferred. Please refer to Dr. HA's excellent note for both.         Medical History  Surgical History Family History Social History   Past Medical History:   Diagnosis Date     Acute on chronic diastolic congestive heart failure (H) 2/9/2022     Arthritis 2019    Hips     ASCUS favor benign 8/2015    Neg HPV     Depressive disorder 2010     H/O colposcopy with cervical biopsy 9/14/15    Bx & ECC - negative     Hypertension 2019     LSIL on Pap smear 7/2014    Neg high risk HPV     Multiple sclerosis (H) 8/29/2005 9/18/200pt dx with MS 2004--dx by neurolgist and is followed by Dr. Steven Ayala 10/2016     UNSPEC CONSTIPATION 9/18/2006 9/18/2006   Has tried otc suppository and otc lax.     Past Surgical History:   Procedure Laterality Date     CHOLECYSTECTOMY, LAPOROSCOPIC      Cholecystectomy, Laparoscopic     COLONOSCOPY  2007?    Bathroom issue..results normal     OPEN REDUCTION INTERNAL FIXATION TOE(S)  4/13/2012    Procedure:OPEN REDUCTION INTERNAL FIXATION TOE(S); Open reduction internal fixation right proximal fifth metatarsal fracture-   Anes-choice block; Surgeon:LEY, JEFFREY DUANE; Location:[place] OR    no family history of premature coronary artery disease Social History     Socioeconomic History     Marital status: Single     Spouse name: Not on file "     Number of children: Not on file     Years of education: Not on file     Highest education level: Not on file   Occupational History     Employer: [email or web address]   Tobacco Use     Smoking status: Never Smoker     Smokeless tobacco: Never Used   Vaping Use     Vaping Use: Never used   Substance and Sexual Activity     Alcohol use: Yes     Comment: social     Drug use: No     Sexual activity: Not Currently     Partners: Male     Birth control/protection: Pill   Other Topics Concern     Parent/sibling w/ CABG, MI or angioplasty before 65F 55M? No   Social History Narrative    , 3rd-5th grade     Social Determinants of Health     Financial Resource Strain: Low Risk      Difficulty of Paying Living Expenses: Not hard at all   Food Insecurity: No Food Insecurity     Worried About Running Out of Food in the Last Year: Never true     Ran Out of Food in the Last Year: Never true   Transportation Needs: No Transportation Needs     Lack of Transportation (Medical): No     Lack of Transportation (Non-Medical): No   Physical Activity: Not on file   Stress: Not on file   Social Connections: Not on file   Intimate Partner Violence: Not At Risk     Fear of Current or Ex-Partner: No     Emotionally Abused: No     Physically Abused: No     Sexually Abused: No   Housing Stability: Not on file           Lab Results    Chemistry/lipid CBC Cardiac Enzymes/BNP/TSH/INR   Lab Results   Component Value Date    CHOL 162 02/11/2016    HDL 64 02/11/2016    TRIG 113 02/11/2016    BUN 13 02/09/2022     02/09/2022    CO2 21 (L) 02/09/2022    Lab Results   Component Value Date    WBC 10.0 02/09/2022    HGB 12.7 02/09/2022    HCT 41.5 02/09/2022    MCV 88 02/09/2022     02/09/2022    Lab Results   Component Value Date    TROPONINI 0.33 (HH) 02/09/2022    BNP 2,230 (H) 02/08/2022    TSH 2.81 01/09/2012    INR 0.96 04/19/2013     Lab Results   Component Value Date    TROPONINI 0.33 (HH) 02/09/2022           Weight:    Wt Readings from Last 3 Encounters:   02/08/22 (!) 217.7 kg (480 lb)   05/25/21 (!) 181.4 kg (400 lb)   03/05/21 (!) 198 kg (436 lb 8.2 oz)       Allergies  Allergies   Allergen Reactions     Nkda [No Known Drug Allergies]          Surgical History  Past Surgical History:   Procedure Laterality Date     CHOLECYSTECTOMY, LAPOROSCOPIC      Cholecystectomy, Laparoscopic     COLONOSCOPY  2007?    Bathroom issue..results normal     OPEN REDUCTION INTERNAL FIXATION TOE(S)  4/13/2012    Procedure:OPEN REDUCTION INTERNAL FIXATION TOE(S); Open reduction internal fixation right proximal fifth metatarsal fracture-   Anes-choice block; Surgeon:LEY, JEFFREY DUANE; Location:[place] OR       Social History  Tobacco:   History   Smoking Status     Never Smoker   Smokeless Tobacco     Never Used    Alcohol:   Social History    Substance and Sexual Activity      Alcohol use: Yes        Comment: social   Illicit Drugs:   History   Drug Use No       Family History  Family History   Problem Relation Age of Onset     Neurologic Disorder Father         MS     Heart Disease Father         irregular heart rate     Diabetes Father      Melanoma Father      Sleep Apnea Father      Other Cancer Father      Cancer Maternal Grandfather         lung     Other Cancer Maternal Grandfather      Cancer Paternal Grandmother         stomach     Other Cancer Paternal Grandmother      Cancer Mother      Other Cancer Mother      Thyroid Disease Mother      Gynecology Maternal Aunt         PCOS     Hypertension Maternal Grandmother      Anxiety Disorder Maternal Grandmother      Thyroid Disease Maternal Grandmother      Anxiety Disorder Sister           Raúl Sanchez MD on 2/9/2022      cc: Mikala Luna,

## 2022-02-09 NOTE — CONSULTS
Consultation - Infectious Disease  Bess Enamorado,  1975, MRN [id number]    Admitting Dx: Cellulitis, unspecified cellulitis site [L03.90]  Pneumonia due to infectious organism, unspecified laterality, unspecified part of lung [J18.9]  Acute pulmonary embolism without acute cor pulmonale, unspecified pulmonary embolism type (H) [I26.99]  COVID-19 [U07.1]    PCP: Mikala Luna, [phone]   Code status:  Full Code       Extended Emergency Contact Information  [facility]  Home Phone: [phone]  Mobile Phone: [phone]  Relation: Friend  Secondary Emergency Contact: [place] ENAMORADO  Home Phone: [phone]  Mobile Phone: [phone]  Relation: Sister       ASSESSMENT   1. Fever. Most likely due to left lower extremity cellulitis. Other possibilities that this is COVID, less likely bacterial pneumonia. Fever and leukocytosis resolved today.   2.  COVID positive. Possibilities are current infection vs residual mRNA remnants from infection in December. She had 2 vaccinations mid . Certainly can see recurrent infection, and in December she would have likely had Delta variant, whereas now it would be Omicron, and the protection from previous infection and vaccine are not as good against Omicron. She is hypoxic, but lung imaging has not shown typical sign of COVID pneumonia. With acute onset fever 2/7 pm, it would be early for COVID pneumonia. CRP elevation -- not clear if this is from COVID or other process -- cellulitis. Oxygenation improved from overnight, so diuresis may be helping.   3. Pulmonary embolism. Right heart strain. Positive troponin.  4. CHF. Getting diuresis.   5. MS  6. Left lower extremity cellulitis -- most suspicious for beta-hemolytic strep.   7. Positive blood cultures -- gram positive rods and coag neg staph are contaminants, not true bacteremia.     Discussed with her and her sister who was on FaceTime. If early  COVID, remdesivir can help prevent progression.   Principal Problem:    Acute respiratory failure with hypoxia (H)  Active Problems:    NARCISA (obstructive sleep apnea)    Cellulitis    Pneumonia due to infectious organism, unspecified laterality, unspecified part of lung    Acute pulmonary embolism without acute cor pulmonale, unspecified pulmonary embolism type (H)    COVID-19    Acute on chronic diastolic congestive heart failure (H)    NSTEMI (non-ST elevated myocardial infarction) (H)       PLAN   Remdesivir 3 days  Continuing antibiotics for cellulitis, can likely de-escalate in next couple of days.   Monitor oxygenation and CRP -- if worsening would likely start dexamethasone.   Diuresis  Heparin for PE  Discussed with Dr. SARAH Alexis MD  [facility]  [phone] [facility] p[facility]  ______________________________________________________________________        Reason For Consult: COVID      HPI    We have been requested by Dr. Lopez to evaluate Bess Enamorado who is a 46 year old year old female for the above. She has history of MS, CHF, NARCISA on CPAP, had COVID vaccine x2 in 2021, had COVID infection in December 2021 and received monoclonal antibody on 12/14/21. She presented yesterday with fever, increased lower extremity swelling and pain.  She has chronic orthopnea and cough that is productive from post-nasal drip. Recalls she was ok until she noted fever on 2/7 in evening.  She also fell yesterday accidentally when getting out of her wheelchair onto her stomach.  She does not use oxygen at home but was found to be hypoxic here. Tested positive for COVID on admission. She has not been out of the house since December, has visiting nurse twice a week who wears mask, and her sister had visited over the weekend. Notes that when she gets a fever that it is difficult to recover due to MS, she gets very weak. Noted left leg cellulitis on admission.        Found to have PE on  admission, high BNP. Started on heparin and lasix IV. Breathing is better, temp better today. Remains on 6L face mask. No known ill contacts as of now.      Habits: Patient does not smoke, denies alcohol use.  Uses a wheelchair or walker.              Medical History  Past Medical History:   Diagnosis Date     Acute on chronic diastolic congestive heart failure (H) 2/9/2022     Arthritis 2019    Hips     ASCUS favor benign 8/2015    Neg HPV     Depressive disorder 2010     H/O colposcopy with cervical biopsy 9/14/15    Bx & ECC - negative     Hypertension 2019     LSIL on Pap smear 7/2014    Neg high risk HPV     Multiple sclerosis (H) 8/29/2005 9/18/200pt dx with MS 2004--dx by neurolgist and is followed by Dr. Steven Ayala 10/2016     UNSPEC CONSTIPATION 9/18/2006 9/18/2006   Has tried otc suppository and otc lax.     Surgical History  She  has a past surgical history that includes cholecystectomy, laporoscopic; Open reduction internal fixation toe(s) (4/13/2012); and colonoscopy (2007?).   Social History  Reviewed, and she  reports that she has never smoked. She has never used smokeless tobacco. She reports current alcohol use. She reports that she does not use drugs.   Allergies  Allergies   Allergen Reactions     Nkda [No Known Drug Allergies]     Family History  family history includes Anxiety Disorder in her maternal grandmother and sister; Cancer in her maternal grandfather, mother, and paternal grandmother; Diabetes in her father; Gynecology in her maternal aunt; Heart Disease in her father; Hypertension in her maternal grandmother; Melanoma in her father; Neurologic Disorder in her father; Other Cancer in her father, maternal grandfather, mother, and paternal grandmother; Sleep Apnea in her father; Thyroid Disease in her maternal grandmother and mother.      Psychosocial Needs  Social History     Social History Narrative    , 3rd-5th grade     Additional psychosocial  needs reviewed per nursing assessment.       Immunization History   Administered Date(s) Administered     COVID-19,PF,Moderna 05/06/2021, 06/03/2021     DTAP (<7y) 02/18/1975, 04/15/1975     Influenza (H1N1) 12/22/2009     Influenza (IIV3) PF 10/13/2010, 11/07/2011, 01/28/2013     Influenza Vaccine IM > 6 months Valent IIV4 (Alfuria,Fluzone) 10/20/2016, 10/25/2018, 02/27/2020, 03/08/2021     OPV, trivalent, live 02/18/1975, 04/15/1975     Pneumococcal 23 valent 06/20/2005     Polio, Unspecified  02/18/1975, 04/15/1975     TD (ADULT, 7+) 06/20/2005     TDAP Vaccine (Adacel) 04/27/2009        Prior to Admission Medications   Medications Prior to Admission   Medication Sig Dispense Refill Last Dose     acetaminophen (TYLENOL) 325 MG tablet Take 650 mg by mouth every 6 hours as needed for mild pain    2/8/2022 at Unknown time     Cholecalciferol (VITAMIN D3 PO) Take 10,000 Units by mouth daily    2/7/2022 at Unknown time     desogestrel-ethinyl estradiol (APRI) 0.15-30 MG-MCG tablet TAKE 1 TABLET DAILY CONTINUOUSLY-OMIT PLACEBPO TABS UNTIL AFTER 3 MONTHS OF HORMONE TABS 112 tablet 3 2/7/2022 at Unknown time     gabapentin (NEURONTIN) 100 MG capsule TAKE 2 CAPSULES(200 MG) BY MOUTH THREE TIMES DAILY 540 capsule 1 2/8/2022 at am     lisinopril (ZESTRIL) 20 MG tablet Take 1 tablet (20 mg) by mouth daily 90 tablet 3 2/7/2022 at Unknown time     metoprolol succinate ER (TOPROL-XL) 25 MG 24 hr tablet TAKE 1 TABLET(25 MG) BY MOUTH DAILY 90 tablet 1 2/7/2022 at Unknown time     miconazole (MICATIN) 2 % external powder Apply topically 2 times daily   2/7/2022 at Unknown time     sertraline (ZOLOFT) 100 MG tablet Take 1 tablet (100 mg) by mouth daily 90 tablet 3 2/7/2022 at Unknown time          Anti-infectives: pip/tazo 2/8-  Vancomycin 2/8-  Cultures: Blood cultures 2/8 -- both sets with GPRs, one set GPC clusters  Imaging: reviewed images          Review of Systems:  All other systems negative in detail except what is noted  above. Physical Exam:  Temp:  [98.2  F (36.8  C)-103.1  F (39.5  C)] 98.4  F (36.9  C)  Pulse:  [] 94  Resp:  [21-41] 22  BP: (110-173)/() 115/71  FiO2 (%):  [40 %] 40 %  SpO2:  [90 %-97 %] 95 %    GENERAL:  In no acute distress, is alert and oriented to person, place, time. Tachypnea with O2 sats 95%.   EYES: No conjunctival injection, pupils equally reactive to light, extra-ocular movement intact  HEAD, EARS, NOSE, MOUTH, AND THROAT: Nontraumatic, mouth without oral ulcers.   RESPIRATORY: Clear anterior.   CARDIOVASCULAR: Regular rate and rhythm, normal S1 and S2, no murmurs, rubs, or gallops.  ABDOMEN: Soft, nontender, no masses, obese.  Normal bowel sounds.  MUSCULOSKELETAL: No synovitis.  LYMPHATIC: severe lymphedema of lower extremities.  SKIN/HAIR/NAILS: erythema right leg with tenderness. Erythema left lower extremity more extensive, over lower leg and into thigh with tenderness.   NEUROLOGIC: Grossly intact.       Pertinent Labs         Recent Labs   Lab 22  0457 22  1115    134*   CO2 21* 18*   BUN 13 16       Lab Results   Component Value Date    ALT 12 2022    AST 18 2022    ALKPHOS 40 (L) 2022          Lab Results   Component Value Date    CRP 21.8 (H) 2022    CRP 7.9 (H) 2022    .0 (H) 2021        Pertinent Radiology  Radiology Results: Reviewed  Echocardiogram Complete    Result Date: 2022  [id number]^TIANA^JORJE^[place] [address]  Name: CITLALY MICHEL MRN: [id number] : 1975 Study Date: 2022 09:28 AM Age: 46 yrs Gender: Female Patient [facility]: [facility] Reason For Study: CHF Ordering Physician: JORJE MONTIEL Performed By:   BSA: 2.8 m2 Height: 64 in Weight: 480 lb HR: 97 ______________________________________________________________________________ Procedure Complete Portable Echo Adult. Definity (NDC #69908-663) given intravenously. No  hemodynamically significant valvular abnormalities on 2D or color flow imaging. Technically difficult, suboptimal study. There is no comparison study available. ______________________________________________________________________________ Interpretation Summary  1.Left ventricular size, wall motion and function are normal. The ejection fraction is > 65%. 2.Moderately decreased right ventricular systolic function 3.No hemodynamically significant valvular abnormalities on 2D or color flow imaging. 4.Technically difficult, suboptimal study due to patient size. There is no comparison study available. ______________________________________________________________________________ I      WMSI = 1.00     % Normal = 100  X - Cannot   0 -                      (2) - Mildly 2 -          Segments  Size Interpret    Hyperkinetic 1 - Normal  Hypokinetic  Hypokinetic  1-2     small                                                    7 -          3-5    moderate 3 - Akinetic 4 -          5 -         6 - Akinetic Dyskinetic   6-14    large              Dyskinetic   Aneurysmal  w/scar       w/scar       15-16   diffuse  Left Ventricle Left ventricular size, wall motion and function are normal. The ejection fraction is > 65%. There is normal left ventricular wall thickness. Left ventricular diastolic function is normal. No regional wall motion abnormalities noted.  Right Ventricle The right ventricle is mildly dilated. Moderately decreased right ventricular systolic function. The right ventricle is not well visualized.  Atria The left atrium is not well visualized. The left atrium is mildly dilated. Right atrial size is normal. There is no color Doppler evidence of an atrial shunt.  Mitral Valve The mitral valve is not well visualized. There is no mitral regurgitation noted. There is no mitral valve stenosis.  Tricuspid Valve The tricuspid valve is not well visualized, but is grossly normal. The tricuspid valve is not well visualized.  Right ventricle systolic pressure estimate normal. There is physiologic tricuspid regurgitation. There is no tricuspid stenosis.  Aortic Valve Aortic valve leaflets appear normal. There is no evidence of aortic stenosis or clinically significant aortic regurgitation. The aortic valve is not well visualized. No aortic regurgitation is present. No aortic stenosis is present.  Pulmonic Valve The pulmonic valve is not well seen, but is grossly normal. The pulmonic valve is not well visualized. This degree of valvular regurgitation is within normal limits. There is no pulmonic valvular stenosis.  Vessels The aorta root is normal. Normal size ascending aorta. IVC diameter <2.1 cm collapsing >50% with sniff suggests a normal RA pressure of 3 mmHg.  Pericardium There is no pericardial effusion.  Rhythm Sinus rhythm was noted.  ______________________________________________________________________________ MMode/2D Measurements & Calculations Ao root diam: 3.1 cm asc Aorta Diam: 3.6 cm  LVOT diam: 2.4 cm LVOT area: 4.7 cm2  Doppler Measurements & Calculations MV E max jim: 96.7 cm/sec MV A max jim: 121.8 cm/sec MV E/A: 0.79 MV dec slope: 538.6 cm/sec2 MV dec time: 0.18 sec Ao V2 max: 138.1 cm/sec Ao max P.0 mmHg Ao V2 mean: 87.3 cm/sec Ao mean PG: 3.7 mmHg Ao V2 VTI: 25.2 cm SHANELL(I,D): 4.1 cm2 SHANELL(V,D): 4.4 cm2 LV V1 max P.8 mmHg LV V1 max: 130.4 cm/sec LV V1 VTI: 22.0 cm SV(LVOT): 103.6 ml SI(LVOT): 36.5 ml/m2 AV Jim Ratio (DI): 0.94 SHANELL Index (cm2/m2): 1.4 E/E' av.8 Lateral E/e': 18.5 Medial E/e': 15.1  ______________________________________________________________________________ Report approved by: Lian Khoury 2022 10:51 AM       US Lower Extremity Venous Duplex Bilateral    Result Date: 2022  EXAM: US LOWER EXTREMITY VENOUS DUPLEX BILATERAL LOCATION: [facility] DATE/TIME: 2022 5:04 PM INDICATION: PE on CT, concern for DVT. COMPARISON: None. TECHNIQUE: Venous  duplex ultrasound of bilateral lower extremities with and without compression, augmentation and duplex. Color flow and spectral Doppler with waveform analysis performed. FINDINGS: Exam includes the common femoral, femoral, popliteal veins as well as segmentally visualized deep calf veins and greater saphenous vein. RIGHT: No deep vein thrombosis. No superficial thrombophlebitis. No popliteal cyst. LEFT: No deep vein thrombosis. No superficial thrombophlebitis. No popliteal cyst.     IMPRESSION: No deep venous thrombosis in the bilateral lower extremities. Visualization of the veins is technically limited by body habitus.    CT Chest Pulmonary Embolism w Contrast    Result Date: 2/8/2022  EXAM: CT CHEST PULMONARY EMBOLISM W CONTRAST LOCATION: [facility] DATE/TIME: 2/8/2022 3:03 PM INDICATION: COVID. Dyspnea. Fever. Weakness. COMPARISON: 03/05/2021 CT TECHNIQUE: CT chest pulmonary angiogram during arterial phase injection of IV contrast. Multiplanar reformats and MIP reconstructions were performed. Dose reduction techniques were used. CONTRAST: isovue 370 100ml FINDINGS: ANGIOGRAM CHEST: Enlarged main pulmonary artery is 4 cm diameter consistent with pulmonary arterial hypertension. Small nonocclusive thrombus right upper lobe pulmonary artery (series 5, image 66). No additional thrombi seen. Thoracic aorta is negative for dissection. RV/LV ratio abnormal, greater than 1.0. LUNGS AND PLEURA: Diffuse lung mosaic attenuation, similar to previous which can be seen with pulmonary arterial hypertension. No right upper and middle lobe groundglass/consolidative infiltrates suggesting an acute pneumonia. No pleural effusion. MEDIASTINUM/AXILLAE: No adenopathy. CORONARY ARTERY CALCIFICATION: None. UPPER ABDOMEN: Absent gallbladder MUSCULOSKELETAL: Upper chest wall venous collaterals. Spinal degenerative change.     IMPRESSION: 1.  Small nonocclusive right upper lobe pulmonary embolism. 2.  Pulmonary  arterial hypertension. 3.  Right heart dysfunction. 4.  New right lung infiltrates compatible with pneumonia. 5.  Report called to Dr. Guillen at 3:30 PM.

## 2022-02-09 NOTE — PROGRESS NOTES
Critical blood culture on 2-8-22 at 11:30 am. Peripheral Blood Culture Staphylococcus epidermidis per lab. Texted MD with update. Patient on Zosyn and Vancomycin. Patient still needs to have CT head. Wound Nurse ordered supplies for left lateral ankle. Call light in reach.

## 2022-02-09 NOTE — ED NOTES
Pt has jiang cath and flexiseal; cellulitis growths are crushing the tube for fecal matter causing spillage. Pt requires total assist with cleaning and transfers.

## 2022-02-09 NOTE — PLAN OF CARE
Problem: Adjustment to Illness (Sepsis/Septic Shock)  Goal: Optimal Coping  Outcome: Declining     Problem: Infection Progression (Sepsis)  Goal: Absence of Infection Signs and Symptoms  Outcome: Declining  Intervention: Promote Recovery  Recent Flowsheet Documentation  Taken 2/9/2022 0429 by Nando Mast, RN  Activity Management: bedrest     Problem: Adjustment to Illness (Acute Coronary Syndrome)  Goal: Optimal Adaptation to Illness  Outcome: Declining     Problem: Hemodynamic Instability (Acute Coronary Syndrome)  Goal: Effective Cardiac Pump Function  Outcome: Declining     Problem: Gas Exchange Impaired  Goal: Optimal Gas Exchange  Outcome: Declining  Intervention: Optimize Oxygenation and Ventilation  Recent Flowsheet Documentation  Taken 2/9/2022 0429 by Nando Mast, RN  Head of Bed (HOB) Positioning: HOB at 20-30 degrees    Admission  Diagnosis: Pulmonary Emboli, CHF exacerbation, NSTEMI, COVID-19 positive, PNA, Cellulitis, Acute Respiratory Failure.  Admitted from: U  Via: stretcher  Accompanied by: Self.  Belongings: Placed in closet; valuables sent home with family  Admission paperwork: complete  Teaching: Call don't fall, use of console, meal times, visiting hours, orientation to unit, when to call for the RN (angina/sob/dizzyness, etc.), and the importance of safety.  Access: PIV  Telemetry:Placed on pt  Ht./Wt.: complete   Drains: Brar/cath.  Flexseal rectal tube.  Respiratory: Bi-PAP.   Anti-Coag: Heparin gtt.  2200 Units/hr at time of admission.

## 2022-02-09 NOTE — PROGRESS NOTES
BIPAP/CPAP NOTE    UNIT TYPE:  V 60   MASK TYPE:  Large     SETTINGS:      BIPAP - 14/8, 12,    FI02 - 40%         PATIENT'S TOLERANCE OF DEVICE -     Pt came off Bipap Machine around 09:00 am placed on 6LOxymask upon pt's request for communications and meals. On Oxymask, pt denies any shortness of breath at this time. Pt is able to maintain adequate saturations and stable respirations. BS: good aerations have been auscultated upper airways and decreased @ bases. Pt will use Bipap Machine at bedtime and as needed.     Bao Maravilla RRT

## 2022-02-09 NOTE — CONSULTS
Care Management Initial Consult    General Information  Assessment completed with: Patient, pt  Type of CM/SW Visit: Initial Assessment    Primary Care Provider verified and updated as needed: Yes   Readmission within the last 30 days: no previous admission in last 30 days   Return Category: Progression of disease  Reason for Consult: discharge planning  Advance Care Planning: Advance Care Planning Reviewed: no concerns identified          Communication Assessment  Patient's communication style: spoken language (English or Bilingual)    Hearing Difficulty or Deaf: no   Wear Glasses or Blind: no    Cognitive  Cognitive/Neuro/Behavioral: WDL                      Living Environment:   People in home: alone     Current living Arrangements: house      Able to return to prior arrangements: yes       Family/Social Support:  Care provided by: self,homecare agency (pca)  Provides care for: no one  Marital Status: Single  PCA,Other (specify)          Description of Support System: Involved    Support Assessment: Adequate family and caregiver support,Adequate social supports    Current Resources:   Patient receiving home care services: Yes  Skilled Home Care Services: Skilled Nursing  Community Resources:    Equipment currently used at home:    Supplies currently used at home:      Employment/Financial:  Employment Status:          Financial Concerns: No concerns identified   Referral to Financial Counselor: No       Lifestyle & Psychosocial Needs:  Social Determinants of Health     Tobacco Use: Low Risk      Smoking Tobacco Use: Never Smoker     Smokeless Tobacco Use: Never Used   Alcohol Use: Not on file   Financial Resource Strain: Low Risk      Difficulty of Paying Living Expenses: Not hard at all   Food Insecurity: No Food Insecurity     Worried About Running Out of Food in the Last Year: Never true     Ran Out of Food in the Last Year: Never true   Transportation Needs: No Transportation Needs     Lack of Transportation  (Medical): No     Lack of Transportation (Non-Medical): No   Physical Activity: Not on file   Stress: Not on file   Social Connections: Not on file   Intimate Partner Violence: Not At Risk     Fear of Current or Ex-Partner: No     Emotionally Abused: No     Physically Abused: No     Sexually Abused: No   Depression: Not at risk     PHQ-2 Score: 0   Housing Stability: Not on file       Functional Status:  Prior to admission patient needed assistance:              Mental Health Status:          Chemical Dependency Status:                Values/Beliefs:  Spiritual, Cultural Beliefs, Nondenominational Practices, Values that affect care:                 Additional Information:  GOYO assessed, lives alone and had WOC RN w/[facility]. Family to transport. Pt states that she has PCA hours but have not been able to get a PCA to wok, WOC RN visits 2/wk and she uses walker and CPap.      Charo Flower RN

## 2022-02-09 NOTE — PHARMACY-VANCOMYCIN DOSING SERVICE
"Pharmacy Vancomycin Initial Note  Date of Service 2022  Patient's  1975  46 year old, female    Indication: Community Acquired Pneumonia and Skin and Soft Tissue Infection    Current estimated CrCl = Estimated Creatinine Clearance: 171.2 mL/min (based on SCr of 0.76 mg/dL).    Creatinine for last 3 days  2022: 11:15 AM Creatinine 0.76 mg/dL    Recent Vancomycin Level(s) for last 3 days  No results found for requested labs within last 72 hours.      Vancomycin IV Administrations (past 72 hours)                   vancomycin (VANCOCIN) 2,500 mg in sodium chloride 0.9 % 500 mL intermittent infusion (mg) 2,500 mg New Bag 22 1230                Nephrotoxins and other renal medications (From now, onward)    Start     Dose/Rate Route Frequency Ordered Stop    22 0000  vancomycin (VANCOCIN) 1,500 mg in sodium chloride 0.9 % 250 mL intermittent infusion         1,500 mg  over 90 Minutes Intravenous EVERY 12 HOURS 22 1840      22 1900  lisinopril (ZESTRIL) tablet 20 mg         20 mg Oral DAILY 22 1755      22 1830  furosemide (LASIX) injection 40 mg         40 mg  over 1-3 Minutes Intravenous EVERY 12 HOURS 22 1757      22 1800  piperacillin-tazobactam (ZOSYN) 3.375 g vial to attach to  mL bag        Note to Pharmacy: For [facility], [facility] and [facility]: For Zosyn-naive patients, use the \"Zosyn initial dose + extended infusion\" order panel.    3.375 g  over 240 Minutes Intravenous EVERY 8 HOURS 22 1757            Contrast Orders - past 72 hours (72h ago, onward)            Start     Dose/Rate Route Frequency Ordered Stop    22 1530  iopamidol (ISOVUE-370) solution 100 mL         100 mL Intravenous ONCE 22 1504 22 1504          [employer] Prediction of Planned Initial Vancomycin Regimen    Loading dose: 2500 mg at 12:30 2022.  Regimen: 1500 mg IV every 12 hours.  Start time: 00:30 on 2022  Exposure target: AUC24 (range)400-600 " mg/L.hr   AUC24,ss: 538 mg/L.hr  Probability of AUC24 > 400: 71 %  Ctrough,ss: 13.6 mg/L  Probability of Ctrough,ss > 20: 33 %  Probability of nephrotoxicity (Lodise ARMANDO 2009): 9 %        Plan:  1. Start vancomycin  1500 mg IV q12h.   2. Vancomycin monitoring method: AUC  3. Vancomycin therapeutic monitoring goal: 400-600 mg*h/L  4. Pharmacy will check vancomycin levels as appropriate in 1-3 Days.    5. Serum creatinine levels will be ordered daily for the first week of therapy and at least twice weekly for subsequent weeks.      Erum Rodriguez, [facility]

## 2022-02-09 NOTE — ED NOTES
Pt placed on home CPAP.   Respitory therapist at bedside attaching pts CPAP to O@ since pts reading was 86% while on CPAP  Pt now on CPAP and O2 of 5L 91%-92%

## 2022-02-09 NOTE — PROGRESS NOTES
Respiratory Care Note     Placed patient on hospital provided bipap 14/8, rate 12 and 60%. Patient tolerating well. Will continue to monitor.

## 2022-02-09 NOTE — PROGRESS NOTES
Speech-Language Pathology: Clinical Swallow Evaluation     02/09/22 1200   General Information   Onset of Illness/Injury or Date of Surgery 02/08/22   Pertinent History of Current Problem Bess Enamorado is a 46 year old female with a recent COVID infection on 12/6/2021 who received monoclonal antibody, MS, chronic lymphedema, hypertension, depression, obstructive sleep apnea, PCOS, MS, admitted for fever, headache, increased leg swelling and cough found to have pneumonia, repeat positive Covid PCR, cellulitis, acute hypoxic respiratory failure and suspected CHF with a small PE    General Observations Now with increased respiratory needs   Past History of Dysphagia no   Type of Evaluation   Type of Evaluation Swallow Evaluation   Oral Motor   Oral Musculature generally intact   Dentition (Oral Motor)   Dentition (Oral Motor) natural dentition;adequate dentition   Facial Symmetry (Oral Motor)   Facial Symmetry (Oral Motor) WNL   Lip Function (Oral Motor)   Lip Range of Motion (Oral Motor) WNL   Tongue Function (Oral Motor)   Tongue ROM (Oral Motor) WNL   Jaw Function (Oral Motor)   Jaw Function (Oral Motor) WNL   Cough/Swallow/Gag Reflex (Oral Motor)   Soft Palate/Velum (Oral Motor) WNL   Vocal Quality/Secretion Management (Oral Motor)   Vocal Quality (Oral Motor) WNL   General Swallowing Observations   Current Diet/Method of Nutritional Intake (General Swallowing Observations, NIS) NPO   Respiratory Support (General Swallowing Observations) oxygen mask   Swallowing Evaluation Clinical swallow evaluation   Clinical Swallow Evaluation   Feeding Assistance set up only required   Clinical Swallow Evaluation Textures Trialed thin liquids;solid foods   Clinical Swallow Eval: Thin Liquid Texture Trial   Mode of Presentation, Thin Liquids straw;self-fed   Volume of Liquid or Food Presented 2 oz   Oral Phase of Swallow WFL   Diagnostic Statement NO oral dysphagia or overt sx' s aspiration   Clinical Swallow Evaluation: Solid  Food Texture Trial   Mode of Presentation self-fed   Volume Presented a few bites of sheela cracker   Oral Phase WFL   Diagnostic Statement No oral dysphagia or overt sx's aspiration   Swallowing Recommendations   Diet Consistency Recommendations regular diet;thin liquids (level 0)   Comment, Swallowing Recommendations No oral dysphagia or overt sx's aspiration noted with regular textures and thin liquids. If there is a concern for aspiration, please order VFSS.   SLP Therapy Assessment/Plan   Therapy Frequency (SLP Eval) evaluation only   SLP Discharge Planning    SLP Brief overview of current status  No ST needs identified.     Total Evaluation Time   Total Evaluation Time (Minutes) 15

## 2022-02-09 NOTE — PROGRESS NOTES
Daily Progress Note    Addendum;  --Received page from patient's nurses stating patient blow her nose and started bleeding from right nostril which is getting better  --Patient seen and examined again, did not appreciate active bleeding at this time, patient denied blood trickling down her oropharynx.  Noticed old blood on her mouth.  --Ordered Afrin spray.  If recurrence of bleeding then consider Rhino Rocket and ENT consult  --Personally discussed with pulmonologist, Dr. Lau, reviewed CT findings, okay to hold heparin upto 4 hours if needed and restart without bolus.  Discussed with patient's nurse to stop for 2 hours for now given no active bleeding now    CODE STATUS:  Full Code    02/09/22  Assessment/Plan:  Bess Enamorado is a 46 year old female with a recent COVID infection on 12/2021 who received monoclonal antibody, MS, chronic lymphedema, hypertension, depression, NARCISA, PCOS, MS who admitted for fever, headache, increased leg swelling and cough found to have pneumonia, repeat positive Covid PCR, cellulitis of lower extremity, acute hypoxic respiratory failure, CHF exacerbation, CT chest with PE.     Acute hypoxic respiratory failure; likely multifactorial etiology-CHF exacerbation, pulmonary embolism, possible bacterial pneumonia, COVID-19 pneumonia, physical deconditioning.    History of NARCISA  --Patient required BiPAP on admission, currently on 6 L oxygen mask.  Titrate O2 as able.   --Incentive spirometry, flutter valve.  --Treat underlying cause as mentioned below    Acute heart failure with preserved EF;  --On admission BNP 2230 and clinically looked volume overload  --Echo reported EF 65%.  Moderately decreased right ventricular systolic function.    --On Lasix drip, continue.    --Monitor intake/output, weight, labs closely.  --Appreciated cardiology input.     Elevated troponin; likely demand ischemia in the setting of acute diastolic heart failure, pulmonary embolism, COVID-19  --Troponin trending  down.    --Cardiologist recommended no further ischemic evaluation at this time, can consider stress testing later and defer timing to cardiology team.    Acute right upper lobe pulmonary embolism;  --CT chest also reported pulmonary artery hypertension and Right heart dysfunction.  --Echo as mentioned above.  --Bilateral lower extremity US negative for DVT  --On heparin drip, continue.    --Pulmonary consulted, awaiting input    Positive COVID-19 test on 12/11/2021 and 2/8/22;  Concern for superimposed bacterial pneumonia;  Concern for bilateral lower extremity cellulitis;  Positive blood culture; likely contamination  --On admission, CT chest reported new right lung infiltrate compatible with pneumonia.  --On admission, elevated procalcitonin and elevated WBC count.  Normal lactic acid.  Sepsis related to pneumonia and cellulitis  --On IV Vanco and IV Zosyn, continue.    --Personally discussed with ID provider, uncertain Covid contributing respiratory issues.  However, likely considering 3 days of remdesivir  --Continue clinical monitoring, lab monitoring  --Wound care nurse consulted     Chronic lower extremity lymphedema;  --Continue local care.  Considering lymphedema wrap    Essential hypertension; controlled  --Continue home metoprolol, lisinopril.  Hydralazine as needed     History of MS;  No longer taking medication.  Primarily has left lower extremity weakness and pain.    --PT OT.      Disposition; pending  Barrier to discharge; multiple medical issues, refer above     LOS: 1 day     Subjective:  Interval History: Patient is new to me.  Patient seen and examined. Notes, labs, imaging reports personally reviewed.  Patient reported breathing better.  Off BiPAP.  On 6 L oxygen mask.  Denied chest pain.  Reported left lower extremity pain and redness.  Denied abdomen pain, nausea, vomiting.  Denied feeling feverish.  Discussed with nursing staffs, reported patient looks winded with minimal movement around the  bed.  Discussed with ID provider.    Review of Systems:   As mentioned in subjective.    Patient Active Problem List   Diagnosis     Multiple sclerosis (H)     Polycystic ovaries     Constipation     CARDIOVASCULAR SCREENING; LDL GOAL LESS THAN 160     Anxiety     Restless legs     Leg edema     Eating disorder     Papanicolaou smear of cervix with low grade squamous intraepithelial lesion (LGSIL)     Morbid obesity (H)     Peroneal tendon rupture     Benign essential hypertension     NARCISA (obstructive sleep apnea)     Moderate episode of recurrent major depressive disorder (H)     Cellulitis of abdominal wall     History of abnormal cervical Pap smear     Generalized anxiety disorder     Lymphedema of both lower extremities     Tachycardia     SIRS (systemic inflammatory response syndrome) (H)     Cellulitis of right leg     Cellulitis     Pneumonia due to infectious organism, unspecified laterality, unspecified part of lung     Acute pulmonary embolism without acute cor pulmonale, unspecified pulmonary embolism type (H)     COVID-19     Acute on chronic diastolic congestive heart failure (H)     Acute respiratory failure with hypoxia (H)     NSTEMI (non-ST elevated myocardial infarction) (H)       Scheduled Meds:    gabapentin  200 mg Oral TID     lisinopril  20 mg Oral Daily     metoprolol succinate ER  25 mg Oral Daily     miconazole   Topical BID     piperacillin-tazobactam  3.375 g Intravenous Q8H     sertraline  100 mg Oral Daily     sodium chloride (PF)  3 mL Intravenous Q8H     sodium chloride (PF)  3 mL Intracatheter Q8H     sodium chloride (PF)  3 mL Intracatheter Q8H     vancomycin  1,500 mg Intravenous Q12H     Continuous Infusions:    furosemide (LASIX) infusion ADULT STANDARD 10 mg/hr (02/09/22 1249)     heparin 2,650 Units/hr (02/09/22 1400)     - MEDICATION INSTRUCTIONS -       PRN Meds:.acetaminophen **OR** acetaminophen, acetaminophen, lidocaine 4%, lidocaine (buffered or not buffered), melatonin,  ondansetron **OR** ondansetron, - MEDICATION INSTRUCTIONS -, senna-docusate **OR** senna-docusate, sodium chloride (PF), sodium chloride (PF), sodium chloride (PF)    Objective:  Vital signs in last 24 hours:  Temp:  [98.2  F (36.8  C)-103.1  F (39.5  C)] 98.4  F (36.9  C)  Pulse:  [] 94  Resp:  [21-41] 22  BP: (110-173)/() 115/71  FiO2 (%):  [40 %] 40 %  SpO2:  [90 %-97 %] 95 %      Intake/Output Summary (Last 24 hours) at 2/9/2022 1502  Last data filed at 2/9/2022 1400  Gross per 24 hour   Intake 1150 ml   Output 3100 ml   Net -1950 ml       Physical Exam:  General: Not in obvious distress.  HEENT: Normocephalic, supple neck  Chest: Decreased but clear to auscultation bilateral anteriorly, no wheezing  Heart: S1S2 normal, regular  Abdomen: Soft.  Morbidly obese. Bowel sounds- active.  Extremities: Bilateral lower extremity lymphedema with skin changes, also noticed redness on both lower extremities more on left than right  Neuro: alert and awake, moves all extremities but has generalized motor weakness      Lab Results:(I have personally reviewed the results)    Recent Results (from the past 24 hour(s))   B-Type Natriuretic Peptide ([facility] Only)    Collection Time: 02/08/22  4:20 PM   Result Value Ref Range    BNP 2,230 (H) 0 - 67 pg/mL   CBC with platelets    Collection Time: 02/08/22  4:20 PM   Result Value Ref Range    WBC Count 13.6 (H) 4.0 - 11.0 10e3/uL    RBC Count 4.84 3.80 - 5.20 10e6/uL    Hemoglobin 13.1 11.7 - 15.7 g/dL    Hematocrit 42.0 35.0 - 47.0 %    MCV 87 78 - 100 fL    MCH 27.1 26.5 - 33.0 pg    MCHC 31.2 (L) 31.5 - 36.5 g/dL    RDW 15.5 (H) 10.0 - 15.0 %    Platelet Count 240 150 - 450 10e3/uL   UA reflex to Microscopic and Culture    Collection Time: 02/08/22  7:56 PM    Specimen: Urine, Brar Catheter   Result Value Ref Range    Color Urine Colorless Colorless, Straw, Light Yellow, Yellow    Appearance Urine Clear Clear    Glucose Urine Negative Negative mg/dL    Bilirubin Urine  Negative Negative    Ketones Urine Negative Negative mg/dL    Specific Gravity Urine 1.009 1.001 - 1.030    Blood Urine 0.5 mg/dL (A) Negative    pH Urine 5.0 5.0 - 7.0    Protein Albumin Urine Negative Negative mg/dL    Urobilinogen Urine <2.0 <2.0 mg/dL    Nitrite Urine Negative Negative    Leukocyte Esterase Urine Negative Negative    Bacteria Urine Few (A) None Seen /HPF    Mucus Urine Present (A) None Seen /LPF    RBC Urine 23 (H) <=2 /HPF    WBC Urine 3 <=5 /HPF    Squamous Epithelials Urine 1 <=1 /HPF   Heparin Unfractionated Anti Xa Level    Collection Time: 02/08/22  8:12 PM   Result Value Ref Range    Anti Xa Unfractionated Heparin 0.12 For Reference Range, See Comment IU/mL   D dimer quantitative    Collection Time: 02/08/22  8:12 PM   Result Value Ref Range    D-Dimer Quantitative 2.33 (H) 0.00 - 0.50 ug/mL FEU   Procalcitonin    Collection Time: 02/08/22  8:12 PM   Result Value Ref Range    Procalcitonin 1.00 (H) 0.00 - 0.49 ng/mL   Troponin I    Collection Time: 02/08/22  8:12 PM   Result Value Ref Range    Troponin I 0.62 (HH) 0.00 - 0.29 ng/mL   Heparin Unfractionated Anti Xa Level    Collection Time: 02/08/22 10:55 PM   Result Value Ref Range    Anti Xa Unfractionated Heparin <0.10 For Reference Range, See Comment IU/mL   Blood gas arterial    Collection Time: 02/08/22 11:29 PM   Result Value Ref Range    pH Arterial 7.46 (H) 7.37 - 7.44    pCO2 Arterial 34 (L) 35 - 45 mm Hg    pO2 Arterial 63 (L) 80 - 90 mm Hg    Bicarbonate Arterial 25 23 - 29 mmol/L    O2 Sat, Arterial 94.2 (L) 96.0 - 97.0 %    Oxyhemoglobin 92.6 (L) 96.0 - 97.0 %    Base Excess/Deficit (+/-) 0.0   mmol/L    Sample Stabilized Temperature 37.0 degrees C   Clostridium difficile toxin B PCR    Collection Time: 02/09/22  1:40 AM    Specimen: Per Rectum; Stool   Result Value Ref Range    C Difficile Toxin B by PCR Negative Negative   Basic metabolic panel    Collection Time: 02/09/22  4:57 AM   Result Value Ref Range    Sodium 136 136  - 145 mmol/L    Potassium 3.8 3.5 - 5.0 mmol/L    Chloride 104 98 - 107 mmol/L    Carbon Dioxide (CO2) 21 (L) 22 - 31 mmol/L    Anion Gap 11 5 - 18 mmol/L    Urea Nitrogen 13 8 - 22 mg/dL    Creatinine 0.82 0.60 - 1.10 mg/dL    Calcium 9.0 8.5 - 10.5 mg/dL    Glucose 116 70 - 125 mg/dL    GFR Estimate 88 >60 mL/min/1.73m2   CBC with platelets    Collection Time: 02/09/22  4:57 AM   Result Value Ref Range    WBC Count 10.0 4.0 - 11.0 10e3/uL    RBC Count 4.70 3.80 - 5.20 10e6/uL    Hemoglobin 12.7 11.7 - 15.7 g/dL    Hematocrit 41.5 35.0 - 47.0 %    MCV 88 78 - 100 fL    MCH 27.0 26.5 - 33.0 pg    MCHC 30.6 (L) 31.5 - 36.5 g/dL    RDW 15.9 (H) 10.0 - 15.0 %    Platelet Count 212 150 - 450 10e3/uL   Magnesium    Collection Time: 02/09/22  4:57 AM   Result Value Ref Range    Magnesium 2.3 1.8 - 2.6 mg/dL   D dimer quantitative    Collection Time: 02/09/22  4:57 AM   Result Value Ref Range    D-Dimer Quantitative 1.91 (H) 0.00 - 0.50 ug/mL FEU   CRP inflammation    Collection Time: 02/09/22  4:57 AM   Result Value Ref Range    CRP 21.8 (H) 0.0-<0.8 mg/dL   Heparin Unfractionated Anti Xa Level    Collection Time: 02/09/22  4:57 AM   Result Value Ref Range    Anti Xa Unfractionated Heparin 0.18 For Reference Range, See Comment IU/mL   Troponin I    Collection Time: 02/09/22  4:57 AM   Result Value Ref Range    Troponin I 0.33 (HH) 0.00 - 0.29 ng/mL   Echocardiogram Complete    Collection Time: 02/09/22 10:28 AM   Result Value Ref Range    LVEF  > 65%    Heparin Unfractionated Anti Xa Level    Collection Time: 02/09/22  1:09 PM   Result Value Ref Range    Anti Xa Unfractionated Heparin 0.13 For Reference Range, See Comment IU/mL         Serum Glucose range:   Recent Labs   Lab 02/09/22  0457 02/08/22  1115    116     ABG:   Recent Labs   Lab 02/08/22  2329   PH 7.46*   PCO2 34*   PO2 63*   HCO3 25     CBC:   Recent Labs   Lab 02/09/22  0457 02/08/22  1620 02/08/22  1115   WBC 10.0 13.6* 15.3*   HGB 12.7 13.1 13.7    HCT 41.5 42.0 43.1   MCV 88 87 86    240 269   NEUTROPHIL  --   --  86   LYMPH  --   --  8   MONOCYTE  --   --  5   EOSINOPHIL  --   --  0     Chemistry:   Recent Labs   Lab 22  1115    134*   POTASSIUM 3.8 4.3   CHLORIDE 104 105   CO2 21* 18*   BUN 13 16   CR 0.82 0.76   GFRESTIMATED 88 >90   EVITA 9.0 9.5   MAG 2.3 1.6*   PROTTOTAL  --  7.3   ALBUMIN  --  3.1*   AST  --  18   ALT  --  12   ALKPHOS  --  40*   BILITOTAL  --  2.3*     Coags:  No results for input(s): INR, PROTIME, PTT in the last 168 hours.    Invalid input(s): APTT  Cardiac Markers:  Recent Labs   Lab 22   TROPONINI 0.33*        Echocardiogram Complete    Result Date: 2022  [id number]^TIANA^JORJE^[place] [address]  Name: CITLALY MICHEL MRN: [id number] : 1975 Study Date: 2022 09:28 AM Age: 46 yrs Gender: Female Patient Location: [facility] Reason For Study: CHF Ordering Physician: JORJE MONTIEL Performed By: BEN  BSA: 2.8 m2 Height: 64 in Weight: 480 lb HR: 97 ______________________________________________________________________________ Procedure Complete Portable Echo Adult. Definity (NDC #60039-661) given intravenously. No hemodynamically significant valvular abnormalities on 2D or color flow imaging. Technically difficult, suboptimal study. There is no comparison study available. ______________________________________________________________________________ Interpretation Summary  1.Left ventricular size, wall motion and function are normal. The ejection fraction is > 65%. 2.Moderately decreased right ventricular systolic function 3.No hemodynamically significant valvular abnormalities on 2D or color flow imaging. 4.Technically difficult, suboptimal study due to patient size. There is no comparison study available. ______________________________________________________________________________ I      WMSI = 1.00     %  Normal = 100  X - Cannot   0 -                      (2) - Mildly 2 -          Segments  Size Interpret    Hyperkinetic 1 - Normal  Hypokinetic  Hypokinetic  1-2     small                                                    7 -          3-5    moderate 3 - Akinetic 4 -          5 -         6 - Akinetic Dyskinetic   6-14    large              Dyskinetic   Aneurysmal  w/scar       w/scar       15-16   diffuse  Left Ventricle Left ventricular size, wall motion and function are normal. The ejection fraction is > 65%. There is normal left ventricular wall thickness. Left ventricular diastolic function is normal. No regional wall motion abnormalities noted.  Right Ventricle The right ventricle is mildly dilated. Moderately decreased right ventricular systolic function. The right ventricle is not well visualized.  Atria The left atrium is not well visualized. The left atrium is mildly dilated. Right atrial size is normal. There is no color Doppler evidence of an atrial shunt.  Mitral Valve The mitral valve is not well visualized. There is no mitral regurgitation noted. There is no mitral valve stenosis.  Tricuspid Valve The tricuspid valve is not well visualized, but is grossly normal. The tricuspid valve is not well visualized. Right ventricle systolic pressure estimate normal. There is physiologic tricuspid regurgitation. There is no tricuspid stenosis.  Aortic Valve Aortic valve leaflets appear normal. There is no evidence of aortic stenosis or clinically significant aortic regurgitation. The aortic valve is not well visualized. No aortic regurgitation is present. No aortic stenosis is present.  Pulmonic Valve The pulmonic valve is not well seen, but is grossly normal. The pulmonic valve is not well visualized. This degree of valvular regurgitation is within normal limits. There is no pulmonic valvular stenosis.  Vessels The aorta root is normal. Normal size ascending aorta. IVC diameter <2.1 cm collapsing >50% with sniff  suggests a normal RA pressure of 3 mmHg.  Pericardium There is no pericardial effusion.  Rhythm Sinus rhythm was noted.  ______________________________________________________________________________ MMode/2D Measurements & Calculations Ao root diam: 3.1 cm asc Aorta Diam: 3.6 cm  LVOT diam: 2.4 cm LVOT area: 4.7 cm2  Doppler Measurements & Calculations MV E max jim: 96.7 cm/sec MV A max jim: 121.8 cm/sec MV E/A: 0.79 MV dec slope: 538.6 cm/sec2 MV dec time: 0.18 sec Ao V2 max: 138.1 cm/sec Ao max P.0 mmHg Ao V2 mean: 87.3 cm/sec Ao mean PG: 3.7 mmHg Ao V2 VTI: 25.2 cm SHANELL(I,D): 4.1 cm2 SHANELL(V,D): 4.4 cm2 LV V1 max P.8 mmHg LV V1 max: 130.4 cm/sec LV V1 VTI: 22.0 cm SV(LVOT): 103.6 ml SI(LVOT): 36.5 ml/m2 AV Jim Ratio (DI): 0.94 SHANELL Index (cm2/m2): 1.4 E/E' av.8 Lateral E/e': 18.5 Medial E/e': 15.1  ______________________________________________________________________________ Report approved by: Lian Khoury 2022 10:51 AM       US Lower Extremity Venous Duplex Bilateral    Result Date: 2022  EXAM: US LOWER EXTREMITY VENOUS DUPLEX BILATERAL LOCATION: [facility] DATE/TIME: 2022 5:04 PM INDICATION: PE on CT, concern for DVT. COMPARISON: None. TECHNIQUE: Venous duplex ultrasound of bilateral lower extremities with and without compression, augmentation and duplex. Color flow and spectral Doppler with waveform analysis performed. FINDINGS: Exam includes the common femoral, femoral, popliteal veins as well as segmentally visualized deep calf veins and greater saphenous vein. RIGHT: No deep vein thrombosis. No superficial thrombophlebitis. No popliteal cyst. LEFT: No deep vein thrombosis. No superficial thrombophlebitis. No popliteal cyst.     IMPRESSION: No deep venous thrombosis in the bilateral lower extremities. Visualization of the veins is technically limited by body habitus.    CT Chest Pulmonary Embolism w Contrast    Result Date: 2022  EXAM: CT CHEST  PULMONARY EMBOLISM W CONTRAST LOCATION: [facility] DATE/TIME: 2/8/2022 3:03 PM INDICATION: COVID. Dyspnea. Fever. Weakness. COMPARISON: 03/05/2021 CT TECHNIQUE: CT chest pulmonary angiogram during arterial phase injection of IV contrast. Multiplanar reformats and MIP reconstructions were performed. Dose reduction techniques were used. CONTRAST: isovue 370 100ml FINDINGS: ANGIOGRAM CHEST: Enlarged main pulmonary artery is 4 cm diameter consistent with pulmonary arterial hypertension. Small nonocclusive thrombus right upper lobe pulmonary artery (series 5, image 66). No additional thrombi seen. Thoracic aorta is negative for dissection. RV/LV ratio abnormal, greater than 1.0. LUNGS AND PLEURA: Diffuse lung mosaic attenuation, similar to previous which can be seen with pulmonary arterial hypertension. No right upper and middle lobe groundglass/consolidative infiltrates suggesting an acute pneumonia. No pleural effusion. MEDIASTINUM/AXILLAE: No adenopathy. CORONARY ARTERY CALCIFICATION: None. UPPER ABDOMEN: Absent gallbladder MUSCULOSKELETAL: Upper chest wall venous collaterals. Spinal degenerative change.     IMPRESSION: 1.  Small nonocclusive right upper lobe pulmonary embolism. 2.  Pulmonary arterial hypertension. 3.  Right heart dysfunction. 4.  New right lung infiltrates compatible with pneumonia. 5.  Report called to Dr. Guillen at 3:30 PM.          Latest radiology report personally reviewed.    Note created using dragon voice recognition software so sounds alike errors may have escaped editing.    02/09/2022   JEFRY SMITH MD  HOSPITALIST, [facility]  PAGER NO. [phone]

## 2022-02-09 NOTE — PLAN OF CARE
Problem: Infection Progression (Sepsis)  Goal: Absence of Infection Signs and Symptoms  Outcome: Improving  Intervention: Promote Recovery  Recent Flowsheet Documentation  Taken 2/9/2022 0809 by Pretty Chambers RN  Activity Management: bedrest     Problem: Cardiac-Related Pain (Acute Coronary Syndrome)  Goal: Absence of Cardiac-Related Pain  Outcome: Improving  Patient pleasant and cooperative and when we turn and reposition her with 4 people she gets dyspneic on exertion. Took off BIPAP to give her medications and placed on oxy mask at 6 liters/nc. O2 sats 93%. Heparin gtt as directed and next Xa 1230 pm. Echo done. Patient on beri bed with assist of 4 people. Legs elevated on pillows. Skin red in folds/groin area and nystatin powder and pillow case in abdominal folds. IV antibiotics given as ordered. Incentive spirometer in room and encouraged to do. Strict I and O. 1500 ml's /Fluid restriction. NPO at present. Call light in reach.

## 2022-02-09 NOTE — PROGRESS NOTES
Contacted by CT regarding STAT head CT for pt.  Pt had arrived on P3 just prior to call.  Order written at approximately 18:00 hrs, 2/8/22.    Paged Hospitalist regarding necessity of head CT at this time (almost 12 hours later).    No reply.  Informed CT that due to multiple complex issues the pt is facing, combined with being in isolation for COVID, and on Bi-PAP, hold CT until day shift.  CT agreed.   Informed CT if the MD needs CT now, they would be alerted immediately.  CT agrees.

## 2022-02-09 NOTE — CONSULTS
Wound Ostomy  WOC Assessment       Allergies:  Nkda [No Known Drug Allergies]    Diagnosis:   Patient Active Problem List    Diagnosis Date Noted     Acute on chronic diastolic congestive heart failure (H) 02/09/2022     Priority: Medium     Acute respiratory failure with hypoxia (H) 02/09/2022     Priority: Medium     NSTEMI (non-ST elevated myocardial infarction) (H) 02/09/2022     Priority: Medium     Pneumonia due to infectious organism, unspecified laterality, unspecified part of lung 02/08/2022     Priority: Medium     Acute pulmonary embolism without acute cor pulmonale, unspecified pulmonary embolism type (H) 02/08/2022     Priority: Medium     COVID-19 02/08/2022     Priority: Medium     Cellulitis 03/05/2021     Priority: Medium     Tachycardia 03/04/2021     Priority: Medium     SIRS (systemic inflammatory response syndrome) (H) 03/04/2021     Priority: Medium     Cellulitis of right leg 03/04/2021     Priority: Medium     Lymphedema of both lower extremities 06/15/2020     Priority: Medium     Generalized anxiety disorder 03/23/2020     Priority: Medium     History of abnormal cervical Pap smear 08/26/2019     Priority: Medium     Cellulitis of abdominal wall 10/22/2018     Priority: Medium     Moderate episode of recurrent major depressive disorder (H) 03/23/2018     Priority: Medium     NARCISA (obstructive sleep apnea) 08/22/2017     Priority: Medium     Severe NARCISA with sleep-associated hypoxemia, with likely REM-related hypoventilation  Polysomnography - Test date 8/9/2017  AHI 45.9, basline SpO2 92.9%, mike SpO2 54.9%, time of SpO2 <= 88% of 31.7 minutes.  Severe desaturations and sustained hypoxemia confined to singular supine REM period (no lateral REM observed for comparison).  Also seen to have TCM increase by ~15 mmHg over baseline in supine REM, consistent with hypoventilation.  Pre-study VBG was WNL.  CPAP titrated to 10 cm H2O and appeared optimal, including supine REM, with normalization of  SpO2 and TCM normalized to low-40's.  Seen to have frequent PLM's (64.3 / hour on diagnostic, 89.6 / hour on treatment, ~20% associated with cortical arousals).         Benign essential hypertension 02/15/2016     Priority: Medium     Peroneal tendon rupture 11/23/2015     Priority: Medium     Morbid obesity (H) 08/24/2015     Priority: Medium     Oct 2016:  Starting [employer] program in [place], goal to lose 140 lbs over next 2 years       Papanicolaou smear of cervix with low grade squamous intraepithelial lesion (LGSIL) 08/03/2014     Priority: Medium     7/29/2014:Pap--LSIL. Neg high risk HPV. Plan cotest in 1 year (if this is ASCUS or LSIL then colp). In reminders  8/20/15: Pap - ASCUS, Neg HPV. Plan colp  9/14/15: [facility] Bx & ECC - negative. Plan cotest in 1 year.   10/20/16: NIL Pap, Neg HPV. Plan cotest in 3 years.        Eating disorder 08/25/2013     Priority: Medium     Binge-eating disorder; social phobia  See psychological assessment from [facility], Dr. Anupama Bowie, 8/14/2013  Problem list name updated by automated process. Provider to review       Leg edema 04/19/2013     Priority: Medium     Anxiety 06/28/2011     Priority: Medium     Followed by neurologist       Restless legs 06/28/2011     Priority: Medium     CARDIOVASCULAR SCREENING; LDL GOAL LESS THAN 160 10/31/2010     Priority: Medium     Constipation 09/18/2006     Priority: Medium     9/18/2006    Has tried otc suppository and otc lax.   Problem list name updated by automated process. Provider to review       Multiple sclerosis (H) 08/29/2005     Priority: Medium     9/18/200pt dx with MS 2004--dx by neurolgist and is followed by Dr. Jeet Nicholson, also MS nurse Heather Thomas       Polycystic ovaries 08/29/2005     Priority: Medium     Diagnosed in [place], had facial hair, overweight, carb craving, records being requested, never on metformin         Height:  [unfilled]    Weight:  [unfilled]    Labs:  Recent Labs   Lab  Test 02/09/22  0457 02/08/22  1620 02/08/22  1115   CRP 21.8*  --  7.9*   HGB 12.7   < > 13.7   ALBUMIN  --   --  3.1*    < > = values in this interval not displayed.       Ben:  Ben Score: 12    Wound culture obtained: No    Edema:  Yes:  Generalized    Anatomic Site/Laterality: L lateral ankle    Reason for ongoing care:   Plan of care    Encounter Type:  Initial Attempted to assess wound on patient's L lateral ankle. She is unable to lift her legs at this time and refused to let writer lift leg due to pain. Reports chronic wound from hitting her ankle on the bolt of her wheelchair. Dry scab on R lateral ankle from same trauma. Will order silvercel and mepilex - this can be applied next time patient is turned.   Both legs are edematous with bright erythema and raised nodules from lymphedema.        Discussed plan of care with patient.    Outcomes and treatment recommendations are to promote skin integrity, contain exudate and promote wound healing.    Actions taken by WOC RN: 2 minutes of education and supplies ordered.    Planned Follow Up: Later this week.    Plan for next visit: Reassess wound(s) and Reassess skin integrity

## 2022-02-10 ENCOUNTER — APPOINTMENT (OUTPATIENT)
Dept: PHYSICAL THERAPY | Facility: HOSPITAL | Age: 47
DRG: 871 | End: 2022-02-10
Attending: FAMILY MEDICINE
Payer: COMMERCIAL

## 2022-02-10 ENCOUNTER — APPOINTMENT (OUTPATIENT)
Dept: OCCUPATIONAL THERAPY | Facility: HOSPITAL | Age: 47
DRG: 871 | End: 2022-02-10
Attending: FAMILY MEDICINE
Payer: COMMERCIAL

## 2022-02-10 LAB
ANION GAP SERPL CALCULATED.3IONS-SCNC: 10 MMOL/L (ref 5–18)
BUN SERPL-MCNC: 15 MG/DL (ref 8–22)
C REACTIVE PROTEIN LHE: 20 MG/DL (ref 0–0.8)
CALCIUM SERPL-MCNC: 8.3 MG/DL (ref 8.5–10.5)
CHLORIDE BLD-SCNC: 103 MMOL/L (ref 98–107)
CO2 SERPL-SCNC: 25 MMOL/L (ref 22–31)
CREAT SERPL-MCNC: 0.81 MG/DL (ref 0.6–1.1)
CREAT SERPL-MCNC: 0.92 MG/DL (ref 0.6–1.1)
ERYTHROCYTE [DISTWIDTH] IN BLOOD BY AUTOMATED COUNT: 15.7 % (ref 10–15)
GFR SERPL CREATININE-BSD FRML MDRD: 77 ML/MIN/1.73M2
GFR SERPL CREATININE-BSD FRML MDRD: 90 ML/MIN/1.73M2
GLUCOSE BLD-MCNC: 107 MG/DL (ref 70–125)
HCT VFR BLD AUTO: 39.2 % (ref 35–47)
HGB BLD-MCNC: 11.8 G/DL (ref 11.7–15.7)
MAGNESIUM SERPL-MCNC: 2 MG/DL (ref 1.8–2.6)
MCH RBC QN AUTO: 27.1 PG (ref 26.5–33)
MCHC RBC AUTO-ENTMCNC: 30.1 G/DL (ref 31.5–36.5)
MCV RBC AUTO: 90 FL (ref 78–100)
PLATELET # BLD AUTO: 213 10E3/UL (ref 150–450)
POTASSIUM BLD-SCNC: 3.5 MMOL/L (ref 3.5–5)
POTASSIUM BLD-SCNC: 3.6 MMOL/L (ref 3.5–5)
RBC # BLD AUTO: 4.36 10E6/UL (ref 3.8–5.2)
SODIUM SERPL-SCNC: 138 MMOL/L (ref 136–145)
UFH PPP CHRO-ACNC: 0.33 IU/ML
UFH PPP CHRO-ACNC: 0.33 IU/ML
UFH PPP CHRO-ACNC: <0.1 IU/ML
VANCOMYCIN SERPL-MCNC: 16.6 MG/L
WBC # BLD AUTO: 7 10E3/UL (ref 4–11)

## 2022-02-10 PROCEDURE — 258N000003 HC RX IP 258 OP 636: Performed by: INTERNAL MEDICINE

## 2022-02-10 PROCEDURE — 250N000011 HC RX IP 250 OP 636: Performed by: INTERNAL MEDICINE

## 2022-02-10 PROCEDURE — 99232 SBSQ HOSP IP/OBS MODERATE 35: CPT | Performed by: INTERNAL MEDICINE

## 2022-02-10 PROCEDURE — 258N000003 HC RX IP 258 OP 636: Performed by: EMERGENCY MEDICINE

## 2022-02-10 PROCEDURE — 250N000013 HC RX MED GY IP 250 OP 250 PS 637: Performed by: INTERNAL MEDICINE

## 2022-02-10 PROCEDURE — 250N000011 HC RX IP 250 OP 636: Performed by: EMERGENCY MEDICINE

## 2022-02-10 PROCEDURE — 97535 SELF CARE MNGMENT TRAINING: CPT | Mod: GO

## 2022-02-10 PROCEDURE — 82565 ASSAY OF CREATININE: CPT | Performed by: INTERNAL MEDICINE

## 2022-02-10 PROCEDURE — 250N000009 HC RX 250: Performed by: INTERNAL MEDICINE

## 2022-02-10 PROCEDURE — 258N000003 HC RX IP 258 OP 636: Performed by: FAMILY MEDICINE

## 2022-02-10 PROCEDURE — 999N000157 HC STATISTIC RCP TIME EA 10 MIN

## 2022-02-10 PROCEDURE — 84132 ASSAY OF SERUM POTASSIUM: CPT | Performed by: INTERNAL MEDICINE

## 2022-02-10 PROCEDURE — 85520 HEPARIN ASSAY: CPT | Performed by: INTERNAL MEDICINE

## 2022-02-10 PROCEDURE — 250N000013 HC RX MED GY IP 250 OP 250 PS 637: Performed by: FAMILY MEDICINE

## 2022-02-10 PROCEDURE — 99233 SBSQ HOSP IP/OBS HIGH 50: CPT | Performed by: INTERNAL MEDICINE

## 2022-02-10 PROCEDURE — 85027 COMPLETE CBC AUTOMATED: CPT | Performed by: INTERNAL MEDICINE

## 2022-02-10 PROCEDURE — 83735 ASSAY OF MAGNESIUM: CPT | Performed by: INTERNAL MEDICINE

## 2022-02-10 PROCEDURE — 94660 CPAP INITIATION&MGMT: CPT

## 2022-02-10 PROCEDURE — 97166 OT EVAL MOD COMPLEX 45 MIN: CPT | Mod: GO

## 2022-02-10 PROCEDURE — 80202 ASSAY OF VANCOMYCIN: CPT | Performed by: INTERNAL MEDICINE

## 2022-02-10 PROCEDURE — 86140 C-REACTIVE PROTEIN: CPT | Performed by: INTERNAL MEDICINE

## 2022-02-10 PROCEDURE — 36415 COLL VENOUS BLD VENIPUNCTURE: CPT | Performed by: INTERNAL MEDICINE

## 2022-02-10 PROCEDURE — 80048 BASIC METABOLIC PNL TOTAL CA: CPT | Performed by: INTERNAL MEDICINE

## 2022-02-10 PROCEDURE — 99223 1ST HOSP IP/OBS HIGH 75: CPT | Performed by: INTERNAL MEDICINE

## 2022-02-10 PROCEDURE — 97162 PT EVAL MOD COMPLEX 30 MIN: CPT | Mod: GP

## 2022-02-10 PROCEDURE — XW043E5 INTRODUCTION OF REMDESIVIR ANTI-INFECTIVE INTO CENTRAL VEIN, PERCUTANEOUS APPROACH, NEW TECHNOLOGY GROUP 5: ICD-10-PCS | Performed by: INTERNAL MEDICINE

## 2022-02-10 PROCEDURE — 250N000011 HC RX IP 250 OP 636: Performed by: FAMILY MEDICINE

## 2022-02-10 PROCEDURE — 210N000001 HC R&B IMCU HEART CARE

## 2022-02-10 PROCEDURE — 97530 THERAPEUTIC ACTIVITIES: CPT | Mod: GP

## 2022-02-10 RX ORDER — LOPERAMIDE HCL 2 MG
2 CAPSULE ORAL 3 TIMES DAILY PRN
Status: DISCONTINUED | OUTPATIENT
Start: 2022-02-10 | End: 2022-02-18 | Stop reason: HOSPADM

## 2022-02-10 RX ORDER — POTASSIUM CHLORIDE 1500 MG/1
20 TABLET, EXTENDED RELEASE ORAL ONCE
Status: COMPLETED | OUTPATIENT
Start: 2022-02-10 | End: 2022-02-10

## 2022-02-10 RX ADMIN — HEPARIN SODIUM 3050 UNITS/HR: 1000 INJECTION, SOLUTION INTRAVENOUS; SUBCUTANEOUS at 01:20

## 2022-02-10 RX ADMIN — VANCOMYCIN HYDROCHLORIDE 1500 MG: 5 INJECTION, POWDER, LYOPHILIZED, FOR SOLUTION INTRAVENOUS at 12:20

## 2022-02-10 RX ADMIN — METOPROLOL SUCCINATE 25 MG: 25 TABLET, EXTENDED RELEASE ORAL at 08:27

## 2022-02-10 RX ADMIN — GABAPENTIN 200 MG: 100 CAPSULE ORAL at 20:37

## 2022-02-10 RX ADMIN — VANCOMYCIN HYDROCHLORIDE 1500 MG: 5 INJECTION, POWDER, LYOPHILIZED, FOR SOLUTION INTRAVENOUS at 00:26

## 2022-02-10 RX ADMIN — SODIUM CHLORIDE 50 ML: 9 INJECTION, SOLUTION INTRAVENOUS at 17:46

## 2022-02-10 RX ADMIN — POTASSIUM CHLORIDE 20 MEQ: 1500 TABLET, EXTENDED RELEASE ORAL at 08:25

## 2022-02-10 RX ADMIN — LOPERAMIDE HYDROCHLORIDE 2 MG: 2 CAPSULE ORAL at 20:37

## 2022-02-10 RX ADMIN — PIPERACILLIN SODIUM AND TAZOBACTAM SODIUM 3.38 G: 3; .375 INJECTION, POWDER, LYOPHILIZED, FOR SOLUTION INTRAVENOUS at 02:16

## 2022-02-10 RX ADMIN — HEPARIN SODIUM 3050 UNITS/HR: 1000 INJECTION, SOLUTION INTRAVENOUS; SUBCUTANEOUS at 10:07

## 2022-02-10 RX ADMIN — HEPARIN SODIUM 3050 UNITS/HR: 1000 INJECTION, SOLUTION INTRAVENOUS; SUBCUTANEOUS at 18:44

## 2022-02-10 RX ADMIN — FUROSEMIDE 10 MG/HR: 10 INJECTION, SOLUTION INTRAVENOUS at 08:30

## 2022-02-10 RX ADMIN — SERTRALINE HYDROCHLORIDE 100 MG: 50 TABLET ORAL at 08:25

## 2022-02-10 RX ADMIN — MICONAZOLE NITRATE: 20 POWDER TOPICAL at 08:27

## 2022-02-10 RX ADMIN — GABAPENTIN 200 MG: 100 CAPSULE ORAL at 08:25

## 2022-02-10 RX ADMIN — PIPERACILLIN SODIUM AND TAZOBACTAM SODIUM 3.38 G: 3; .375 INJECTION, POWDER, LYOPHILIZED, FOR SOLUTION INTRAVENOUS at 19:28

## 2022-02-10 RX ADMIN — GABAPENTIN 200 MG: 100 CAPSULE ORAL at 14:14

## 2022-02-10 RX ADMIN — REMDESIVIR 100 MG: 100 INJECTION, POWDER, LYOPHILIZED, FOR SOLUTION INTRAVENOUS at 18:06

## 2022-02-10 RX ADMIN — FUROSEMIDE 10 MG/HR: 10 INJECTION, SOLUTION INTRAVENOUS at 18:10

## 2022-02-10 RX ADMIN — PIPERACILLIN SODIUM AND TAZOBACTAM SODIUM 3.38 G: 3; .375 INJECTION, POWDER, LYOPHILIZED, FOR SOLUTION INTRAVENOUS at 08:42

## 2022-02-10 RX ADMIN — MICONAZOLE NITRATE 1 APPLICATOR: 20 POWDER TOPICAL at 20:37

## 2022-02-10 ASSESSMENT — ACTIVITIES OF DAILY LIVING (ADL)
PREVIOUS_RESPONSIBILITIES: MEAL PREP;HOUSEKEEPING;LAUNDRY;SHOPPING;MEDICATION MANAGEMENT
ADLS_ACUITY_SCORE: 21
ADLS_ACUITY_SCORE: 23
ADLS_ACUITY_SCORE: 21
ADLS_ACUITY_SCORE: 21
ADLS_ACUITY_SCORE: 23
ADLS_ACUITY_SCORE: 21
IADL_COMMENTS: SHOWER CHAIR
ADLS_ACUITY_SCORE: 21
ADLS_ACUITY_SCORE: 23
ADLS_ACUITY_SCORE: 21
ADLS_ACUITY_SCORE: 23
ADLS_ACUITY_SCORE: 21
ADLS_ACUITY_SCORE: 23

## 2022-02-10 ASSESSMENT — MIFFLIN-ST. JEOR: SCORE: 2415.34

## 2022-02-10 NOTE — PHARMACY-VANCOMYCIN DOSING SERVICE
Pharmacy Vancomycin Note  Date of Service February 10, 2022  Patient's  1975   46 year old, female    Indication: Community Acquired Pneumonia and Skin and Soft Tissue Infection  Day of Therapy: 3  Current vancomycin regimen:  1500 mg IV q12h  Current vancomycin monitoring method: AUC  Current vancomycin therapeutic monitoring goal: 400-600 mg*h/L    [employer] Prediction of Current Vancomycin Regimen  Regimen: 1500 mg IV every 12 hours.  Start time: 12:26 on 02/10/2022  Exposure target: AUC24 (range)400-600 mg/L.hr   AUC24,ss: 570 mg/L.hr  Probability of AUC24 > 400: 99 %  Ctrough,ss: 15.2 mg/L  Probability of Ctrough,ss > 20: 1 %  Probability of nephrotoxicity (Lodise ARMANDO ): 10 %      Current estimated CrCl = Estimated Creatinine Clearance: 124.8 mL/min (based on SCr of 0.92 mg/dL).    Creatinine for last 3 days  2022: 11:15 AM Creatinine 0.76 mg/dL  2022:  4:57 AM Creatinine 0.82 mg/dL  2/10/2022:  5:34 AM Creatinine 0.92 mg/dL    Recent Vancomycin Levels (past 3 days)  2/10/2022: 10:57 AM Vancomycin 16.6 mg/L    Vancomycin IV Administrations (past 72 hours)                   vancomycin (VANCOCIN) 1,500 mg in sodium chloride 0.9 % 250 mL intermittent infusion (mg) 1,500 mg New Bag 02/10/22 0026     1,500 mg New Bag 22 1155     1,500 mg New Bag 22 2358    vancomycin (VANCOCIN) 2,500 mg in sodium chloride 0.9 % 500 mL intermittent infusion (mg) 2,500 mg New Bag 22 1230                Nephrotoxins and other renal medications (From now, onward)    Start     Dose/Rate Route Frequency Ordered Stop    02/10/22 0800  lisinopril (ZESTRIL) tablet 10 mg         10 mg Oral DAILY 22 1611      22 1200  furosemide (LASIX) 100 mg in sodium chloride 0.9 % 100 mL infusion         10 mg/hr  10 mL/hr  Intravenous CONTINUOUS 22 1159      22 0000  vancomycin (VANCOCIN) 1,500 mg in sodium chloride 0.9 % 250 mL intermittent infusion         1,500 mg  over 90 Minutes  "Intravenous EVERY 12 HOURS 02/08/22 1840      02/08/22 1800  piperacillin-tazobactam (ZOSYN) 3.375 g vial to attach to  mL bag        Note to Pharmacy: For [facility], [facility] and [facility]: For Zosyn-naive patients, use the \"Zosyn initial dose + extended infusion\" order panel.    3.375 g  over 240 Minutes Intravenous EVERY 8 HOURS 02/08/22 1757               Contrast Orders - past 72 hours (72h ago, onward)            Start     Dose/Rate Route Frequency Ordered Stop    02/09/22 1030  perflutren lipid microsphere (DEFINITY) injection SUSP          Intravenous ONCE 02/09/22 1026 02/09/22 0928    02/08/22 1530  iopamidol (ISOVUE-370) solution 100 mL         100 mL Intravenous ONCE 02/08/22 1504 02/08/22 1504          Interpretation of levels and current regimen:  Vancomycin level is reflective of -600    Has serum creatinine changed greater than 50% in last 72 hours: no    Urine output:  good urine output    Renal Function: Stable      Plan:  1. Continue Current Dose  2. Vancomycin monitoring method: AUC  3. Vancomycin therapeutic monitoring goal: 400-600 mg*h/L  4. Pharmacy will check vancomycin levels as appropriate in 3-5 Days.  5. Serum creatinine levels will be ordered daily for the first week of therapy and at least twice weekly for subsequent weeks.    Loan Michel, [facility]    "

## 2022-02-10 NOTE — PROGRESS NOTES
Daily Progress Note    CODE STATUS:  Full Code    02/09/22  Assessment/Plan:  Bess Enamorado is a 46 year old female with a recent COVID infection on 12/2021 who received monoclonal antibody, MS, chronic lymphedema, hypertension, depression, NARCISA, PCOS, MS who admitted for fever, headache, increased leg swelling and cough found to have pneumonia, repeat positive Covid PCR, cellulitis of lower extremity, acute hypoxic respiratory failure, CHF exacerbation, CT chest with PE.     Acute hypoxic respiratory failure; likely multifactorial etiology-CHF exacerbation, pulmonary embolism, possible bacterial pneumonia, COVID-19 pneumonia, physical deconditioning.    History of NARCISA  --Patient required BiPAP on admission.  Currently on 6 L oxygen mask.  Titrate O2 as able.   --Incentive spirometry, flutter valve.  --Treat underlying cause as mentioned below    Acute heart failure with preserved EF;  --On admission BNP 2230 and clinically looked volume overload  --Echo reported EF 65%.  Moderately decreased right ventricular systolic function.    --On Lasix drip, continue.    --Monitor intake/output, weight, labs closely.  --Appreciated cardiology input.     Elevated troponin; likely demand ischemia in the setting of acute diastolic heart failure, pulmonary embolism, COVID-19  --Troponin trending down.    --Cardiologist recommended no further ischemic evaluation at this time, can consider stress testing later and defer timing to cardiology team.    Acute right upper lobe pulmonary embolism;  --CT chest also reported pulmonary artery hypertension and Right heart dysfunction.  --Echo as mentioned above.  --Bilateral lower extremity US negative for DVT  --On heparin drip, continue.  Requested pharmacy to review cost for DOAC  --Pulmonary consulted, awaiting input    Positive COVID-19 test on 12/11/2021 and 2/8/22;  Sepsis likely due to left lower extremity cellulitis;  Positive blood culture; likely contamination  --On admission, CT  chest reported new right lung infiltrate compatible with pneumonia.  --On admission, elevated procalcitonin and elevated WBC count.  Normal lactic acid.  --Appreciated ID input, reported fever likely due to LLE cellulitis, reported less likely bacterial pneumonia.  --On IV Vanco, discontinued on 2/10.  Continue IV Zosyn.  --3 days course of remdesivir.  --Continue clinical monitoring, lab monitoring  --Wound care nurse on case    Chronic lower extremity lymphedema;  --Continue local care. lymphedema wrap    Essential hypertension; controlled  --Continue home metoprolol, lisinopril.  Hydralazine as needed     History of MS;  No longer taking medication.  Primarily has left lower extremity weakness and pain.    --PT OT.      Disposition; pending  Barrier to discharge; multiple medical issues, refer above     LOS: 1 day     Subjective:  Interval History: Patient seen and examined. Notes, labs, imaging reports personally reviewed.  Patient reported breathing stable to improving.  Denied chest pain, cough or congestion.  No recurrence of nasal bleeding.  Denied feeling feverish.  Still complaining of left lower extremity pain but improving.  Patient still on 6 L oxygen mask.  Discussed with nursing staff.  Discussed with pulmonologist.  Discussed with therapy team.  Patient declined me to call any family members.      Review of Systems:   As mentioned in subjective.    Patient Active Problem List   Diagnosis     Multiple sclerosis (H)     Polycystic ovaries     Constipation     CARDIOVASCULAR SCREENING; LDL GOAL LESS THAN 160     Anxiety     Restless legs     Leg edema     Eating disorder     Papanicolaou smear of cervix with low grade squamous intraepithelial lesion (LGSIL)     Morbid obesity (H)     Peroneal tendon rupture     Benign essential hypertension     NARCISA (obstructive sleep apnea)     Moderate episode of recurrent major depressive disorder (H)     Cellulitis of abdominal wall     History of abnormal cervical  Pap smear     Generalized anxiety disorder     Lymphedema of both lower extremities     Tachycardia     SIRS (systemic inflammatory response syndrome) (H)     Cellulitis of right leg     Cellulitis     Pneumonia due to infectious organism, unspecified laterality, unspecified part of lung     Acute pulmonary embolism without acute cor pulmonale, unspecified pulmonary embolism type (H)     COVID-19     Acute on chronic diastolic congestive heart failure (H)     Acute respiratory failure with hypoxia (H)     NSTEMI (non-ST elevated myocardial infarction) (H)       Scheduled Meds:    remdesivir  100 mg Intravenous Q24H    And     sodium chloride 0.9%  50 mL Intravenous Q24H     gabapentin  200 mg Oral TID     lisinopril  10 mg Oral Daily     metoprolol succinate ER  25 mg Oral Daily     miconazole   Topical BID     piperacillin-tazobactam  3.375 g Intravenous Q8H     sertraline  100 mg Oral Daily     sodium chloride (PF)  3 mL Intravenous Q8H     Continuous Infusions:    furosemide (LASIX) infusion ADULT STANDARD 10 mg/hr (02/10/22 0830)     heparin 3,050 Units/hr (02/10/22 1007)     - MEDICATION INSTRUCTIONS -       PRN Meds:.acetaminophen **OR** acetaminophen, lidocaine 4%, lidocaine (buffered or not buffered), melatonin, ondansetron **OR** ondansetron, oxymetazoline, - MEDICATION INSTRUCTIONS -, senna-docusate **OR** senna-docusate, sodium chloride, sodium chloride (PF), sodium chloride (PF)    Objective:  Vital signs in last 24 hours:  Temp:  [97.5  F (36.4  C)-99.8  F (37.7  C)] 98.2  F (36.8  C)  Pulse:  [] 84  Resp:  [20-24] 22  BP: (105-119)/(63-77) 109/65  FiO2 (%):  [45 %] 45 %  SpO2:  [92 %-98 %] 95 %      Intake/Output Summary (Last 24 hours) at 2/9/2022 1502  Last data filed at 2/9/2022 1400  Gross per 24 hour   Intake 1150 ml   Output 3100 ml   Net -1950 ml       Physical Exam:  General: Not in obvious distress.  HEENT: Normocephalic, supple neck  Chest: Decreased but clear to auscultation bilateral  anteriorly, no wheezing  Heart: S1S2 normal, regular  Abdomen: Soft.  Morbidly obese. Bowel sounds- active.  Extremities: Bilateral lower extremity lymphedema with skin changes, also noticed redness on both lower extremities more on left than right  Neuro: alert and awake, moves all extremities but has generalized motor weakness      Lab Results:(I have personally reviewed the results)    Recent Results (from the past 24 hour(s))   Heparin Unfractionated Anti Xa Level    Collection Time: 02/10/22 12:47 AM   Result Value Ref Range    Anti Xa Unfractionated Heparin <0.10 For Reference Range, See Comment IU/mL   CBC with platelets    Collection Time: 02/10/22  5:33 AM   Result Value Ref Range    WBC Count 7.0 4.0 - 11.0 10e3/uL    RBC Count 4.36 3.80 - 5.20 10e6/uL    Hemoglobin 11.8 11.7 - 15.7 g/dL    Hematocrit 39.2 35.0 - 47.0 %    MCV 90 78 - 100 fL    MCH 27.1 26.5 - 33.0 pg    MCHC 30.1 (L) 31.5 - 36.5 g/dL    RDW 15.7 (H) 10.0 - 15.0 %    Platelet Count 213 150 - 450 10e3/uL   Heparin Unfractionated Anti Xa Level    Collection Time: 02/10/22  5:33 AM   Result Value Ref Range    Anti Xa Unfractionated Heparin 0.33 For Reference Range, See Comment IU/mL   Basic metabolic panel    Collection Time: 02/10/22  5:34 AM   Result Value Ref Range    Sodium 138 136 - 145 mmol/L    Potassium 3.5 3.5 - 5.0 mmol/L    Chloride 103 98 - 107 mmol/L    Carbon Dioxide (CO2) 25 22 - 31 mmol/L    Anion Gap 10 5 - 18 mmol/L    Urea Nitrogen 15 8 - 22 mg/dL    Creatinine 0.92 0.60 - 1.10 mg/dL    Calcium 8.3 (L) 8.5 - 10.5 mg/dL    Glucose 107 70 - 125 mg/dL    GFR Estimate 77 >60 mL/min/1.73m2   CRP inflammation    Collection Time: 02/10/22  5:34 AM   Result Value Ref Range    CRP 20.0 (H) 0.0-<0.8 mg/dL   Vancomycin level    Collection Time: 02/10/22 10:57 AM   Result Value Ref Range    Vancomycin 16.6   mg/L   Heparin Unfractionated Anti Xa Level    Collection Time: 02/10/22  1:56 PM   Result Value Ref Range    Anti Xa  Unfractionated Heparin 0.33 For Reference Range, See Comment IU/mL         Serum Glucose range:   Recent Labs   Lab 02/10/22  0534 22  0457 22  1115    116 116     ABG:   Recent Labs   Lab 22  2329   PH 7.46*   PCO2 34*   PO2 63*   HCO3 25     CBC:   Recent Labs   Lab 02/10/22  0533 22  0457 22  1620 22  1115   WBC 7.0 10.0 13.6* 15.3*   HGB 11.8 12.7 13.1 13.7   HCT 39.2 41.5 42.0 43.1   MCV 90 88 87 86    212 240 269   NEUTROPHIL  --   --   --  86   LYMPH  --   --   --  8   MONOCYTE  --   --   --  5   EOSINOPHIL  --   --   --  0     Chemistry:   Recent Labs   Lab 02/10/22  0534 22  0457 22  1115    136 134*   POTASSIUM 3.5 3.8 4.3   CHLORIDE 103 104 105   CO2 25 21* 18*   BUN 15 13 16   CR 0.92 0.82 0.76   GFRESTIMATED 77 88 >90   EVITA 8.3* 9.0 9.5   MAG  --  2.3 1.6*   PROTTOTAL  --   --  7.3   ALBUMIN  --   --  3.1*   AST  --   --  18   ALT  --   --  12   ALKPHOS  --   --  40*   BILITOTAL  --   --  2.3*     Coags:  No results for input(s): INR, PROTIME, PTT in the last 168 hours.    Invalid input(s): APTT  Cardiac Markers:  Recent Labs   Lab 22  045   TROPONINI 0.33*        Echocardiogram Complete    Result Date: 2022  [id number]^TIANA^JORJE^[place] [address]  Name: CITLALY MICHEL MRN: [id number] : 1975 Study Date: 2022 09:28 AM Age: 46 yrs Gender: Female Patient Location: [facility] Reason For Study: CHF Ordering Physician: JORJE MONTIEL Performed By: AH  BSA: 2.8 m2 Height: 64 in Weight: 480 lb HR: 97 ______________________________________________________________________________ Procedure Complete Portable Echo Adult. Definity (NDC #33085-992) given intravenously. No hemodynamically significant valvular abnormalities on 2D or color flow imaging. Technically difficult, suboptimal study. There is no comparison study available.  ______________________________________________________________________________ Interpretation Summary  1.Left ventricular size, wall motion and function are normal. The ejection fraction is > 65%. 2.Moderately decreased right ventricular systolic function 3.No hemodynamically significant valvular abnormalities on 2D or color flow imaging. 4.Technically difficult, suboptimal study due to patient size. There is no comparison study available. ______________________________________________________________________________ I      WMSI = 1.00     % Normal = 100  X - Cannot   0 -                      (2) - Mildly 2 -          Segments  Size Interpret    Hyperkinetic 1 - Normal  Hypokinetic  Hypokinetic  1-2     small                                                    7 -          3-5    moderate 3 - Akinetic 4 -          5 -         6 - Akinetic Dyskinetic   6-14    large              Dyskinetic   Aneurysmal  w/scar       w/scar       15-16   diffuse  Left Ventricle Left ventricular size, wall motion and function are normal. The ejection fraction is > 65%. There is normal left ventricular wall thickness. Left ventricular diastolic function is normal. No regional wall motion abnormalities noted.  Right Ventricle The right ventricle is mildly dilated. Moderately decreased right ventricular systolic function. The right ventricle is not well visualized.  Atria The left atrium is not well visualized. The left atrium is mildly dilated. Right atrial size is normal. There is no color Doppler evidence of an atrial shunt.  Mitral Valve The mitral valve is not well visualized. There is no mitral regurgitation noted. There is no mitral valve stenosis.  Tricuspid Valve The tricuspid valve is not well visualized, but is grossly normal. The tricuspid valve is not well visualized. Right ventricle systolic pressure estimate normal. There is physiologic tricuspid regurgitation. There is no tricuspid stenosis.  Aortic Valve Aortic valve  leaflets appear normal. There is no evidence of aortic stenosis or clinically significant aortic regurgitation. The aortic valve is not well visualized. No aortic regurgitation is present. No aortic stenosis is present.  Pulmonic Valve The pulmonic valve is not well seen, but is grossly normal. The pulmonic valve is not well visualized. This degree of valvular regurgitation is within normal limits. There is no pulmonic valvular stenosis.  Vessels The aorta root is normal. Normal size ascending aorta. IVC diameter <2.1 cm collapsing >50% with sniff suggests a normal RA pressure of 3 mmHg.  Pericardium There is no pericardial effusion.  Rhythm Sinus rhythm was noted.  ______________________________________________________________________________ MMode/2D Measurements & Calculations Ao root diam: 3.1 cm asc Aorta Diam: 3.6 cm  LVOT diam: 2.4 cm LVOT area: 4.7 cm2  Doppler Measurements & Calculations MV E max jim: 96.7 cm/sec MV A max jim: 121.8 cm/sec MV E/A: 0.79 MV dec slope: 538.6 cm/sec2 MV dec time: 0.18 sec Ao V2 max: 138.1 cm/sec Ao max P.0 mmHg Ao V2 mean: 87.3 cm/sec Ao mean PG: 3.7 mmHg Ao V2 VTI: 25.2 cm SHANELL(I,D): 4.1 cm2 SHANELL(V,D): 4.4 cm2 LV V1 max P.8 mmHg LV V1 max: 130.4 cm/sec LV V1 VTI: 22.0 cm SV(LVOT): 103.6 ml SI(LVOT): 36.5 ml/m2 AV Jim Ratio (DI): 0.94 SHANELL Index (cm2/m2): 1.4 E/E' av.8 Lateral E/e': 18.5 Medial E/e': 15.1  ______________________________________________________________________________ Report approved by: Lian Khoury 2022 10:51 AM       US Lower Extremity Venous Duplex Bilateral    Result Date: 2022  EXAM: US LOWER EXTREMITY VENOUS DUPLEX BILATERAL LOCATION: [facility] DATE/TIME: 2022 5:04 PM INDICATION: PE on CT, concern for DVT. COMPARISON: None. TECHNIQUE: Venous duplex ultrasound of bilateral lower extremities with and without compression, augmentation and duplex. Color flow and spectral Doppler with waveform analysis  performed. FINDINGS: Exam includes the common femoral, femoral, popliteal veins as well as segmentally visualized deep calf veins and greater saphenous vein. RIGHT: No deep vein thrombosis. No superficial thrombophlebitis. No popliteal cyst. LEFT: No deep vein thrombosis. No superficial thrombophlebitis. No popliteal cyst.     IMPRESSION: No deep venous thrombosis in the bilateral lower extremities. Visualization of the veins is technically limited by body habitus.    CT Chest Pulmonary Embolism w Contrast    Result Date: 2/8/2022  EXAM: CT CHEST PULMONARY EMBOLISM W CONTRAST LOCATION: [facility] DATE/TIME: 2/8/2022 3:03 PM INDICATION: COVID. Dyspnea. Fever. Weakness. COMPARISON: 03/05/2021 CT TECHNIQUE: CT chest pulmonary angiogram during arterial phase injection of IV contrast. Multiplanar reformats and MIP reconstructions were performed. Dose reduction techniques were used. CONTRAST: isovue 370 100ml FINDINGS: ANGIOGRAM CHEST: Enlarged main pulmonary artery is 4 cm diameter consistent with pulmonary arterial hypertension. Small nonocclusive thrombus right upper lobe pulmonary artery (series 5, image 66). No additional thrombi seen. Thoracic aorta is negative for dissection. RV/LV ratio abnormal, greater than 1.0. LUNGS AND PLEURA: Diffuse lung mosaic attenuation, similar to previous which can be seen with pulmonary arterial hypertension. No right upper and middle lobe groundglass/consolidative infiltrates suggesting an acute pneumonia. No pleural effusion. MEDIASTINUM/AXILLAE: No adenopathy. CORONARY ARTERY CALCIFICATION: None. UPPER ABDOMEN: Absent gallbladder MUSCULOSKELETAL: Upper chest wall venous collaterals. Spinal degenerative change.     IMPRESSION: 1.  Small nonocclusive right upper lobe pulmonary embolism. 2.  Pulmonary arterial hypertension. 3.  Right heart dysfunction. 4.  New right lung infiltrates compatible with pneumonia. 5.  Report called to Dr. Guillen at 3:30  PM.          Latest radiology report personally reviewed.    Note created using dragon voice recognition software so sounds alike errors may have escaped editing.    02/10/2022   JEFRY SMITH MD  HOSPITALIST, [facility]  PAGER NO. [phone]

## 2022-02-10 NOTE — PLAN OF CARE
Problem: Gas Exchange Impaired  Goal: Optimal Gas Exchange  Outcome: Improving  Intervention: Optimize Oxygenation and Ventilation  Recent Flowsheet Documentation  Taken 2/10/2022 0030 by Abhi Vo RN  Head of Bed (HOB) Positioning: HOB at 45 degrees     Pt tolerated BIPAP overnight, maintained oxygen saturation in mid to high 90's. Lungs diminished.     Problem: Infection Progression (Sepsis)  Goal: Absence of Infection Signs and Symptoms  Outcome: Improving  Intervention: Initiate Sepsis Management  Recent Flowsheet Documentation  Taken 2/10/2022 0030 by Abhi Vo RN  Isolation Precautions:   airborne precautions maintained   contact precautions maintained  Intervention: Promote Recovery  Recent Flowsheet Documentation  Taken 2/10/2022 0030 by Abhi Vo RN  Activity Management: activity adjusted per tolerance     Pt on double IV antibiotics for bacteremia, cellulitis and possible PNA. VSS, afebrile this shift.    Problem: Venous Thromboembolism  Goal: VTE Symptom Resolution  Outcome: Improving     No more nasal bleeding noted this shift.  Pt continues on Heparin drip at 3050 units/hr, which was increased with a bolus this shift. Next Anti Xa 0715.

## 2022-02-10 NOTE — PROGRESS NOTES
"PT has been on OxyMask 6L since approx 0930 this AM.  BIPAP remains in room on stand-by.    /65 (BP Location: Left arm)   Pulse 85   Temp 98.2  F (36.8  C) (Axillary)   Resp 22   Ht 1.626 m (5' 4\")   Wt (!) 179.5 kg (395 lb 12.8 oz)   LMP 01/31/2022   SpO2 95%   BMI 67.94 kg/m      Gerald Eason, RT  2/10/2022    "

## 2022-02-10 NOTE — PROGRESS NOTES
02/10/22 1315   Quick Adds   Type of Visit Initial Occupational Therapy Evaluation   Living Environment   People in home alone   Current Living Arrangements house   Home Accessibility other (see comments)  (portable ramp/1 step into house)   Living Environment Comments 1 level/w/c/walker/ walk in shower,commode n ext to recliner, RTS in bathroom/sleeps in a recliner   Self-Care   Usual Activity Tolerance moderate   Activity/Exercise/Self-Care Comment pt needs a PCA but cannot find one   Instrumental Activities of Daily Living (IADL)   Previous Responsibilities meal prep;housekeeping;laundry;shopping;medication management   IADL Comments shower chair   Disability/Function   Hearing Difficulty or Deaf no   General Information   Onset of Illness/Injury or Date of Surgery 02/08/22   Referring Physician howard LELAND   Range of Motion Comprehensive   General Range of Motion no range of motion deficits identified   Bed Mobility   Bed Mobility rolling right;rolling left;supine-sit;sit-supine   Rolling Left [place] (Bed Mobility) maximum assist (25% patient effort)   Rolling Right [place] (Bed Mobility) maximum assist (25% patient effort)   Supine-Sit [place] (Bed Mobility) supervision   Sit-Supine [place] (Bed Mobility) maximum assist (25% patient effort);2 person assist   Balance   Balance Assessment sitting balance: static   Clinical Impression   Criteria for Skilled Therapeutic Interventions Met (OT) yes   OT Diagnosis decreased adl's   OT Problem List-Impairments impacting ADL activity tolerance impaired;mobility;strength   Assessment of Occupational Performance 1-3 Performance Deficits   Planned Therapy Interventions (OT) ADL retraining;strengthening;transfer training   Clinical Decision Making Complexity (OT) moderate complexity   Therapy Frequency (OT) Daily   Predicted Duration of Therapy 7 days   Risk & Benefits of therapy have been explained evaluation/treatment results reviewed   OT Discharge  Planning    OT Discharge Recommendation (DC Rec) Transitional Care Facility   OT Rationale for DC Rec assist of 2 or more for bed mobility/adl's   Total Evaluation Time (Minutes)   Total Evaluation Time (Minutes) 15

## 2022-02-10 NOTE — PROGRESS NOTES
HEART CARE NOTE          Assessment/Recommendations     1. HFpEF c/b ADHF  Assessment / Plan  Echo significant for RV dysfunction in the  of PE  Diuresing well on current regimen; no changes at time  GDMT pending echo results; will need repeat echo to reassess RV function and RVSP in several months which may determine the need for VQ scan an potential CTS consult for CTEPH     2. Elevated troponin  Assessment / Plan  Mildly elevated in the setting of ADHF, COVID-19 infection and PE and likely associated RV strain; now trending down  Will hold off on further ischemic evaluation at this time; can consider stress testing once COVID-19 infection has been treated     3. COVID-19 infection  Assessment / Plan  Management primary team     Start: 10:15 am  Time: 10:30 am    History of Present Illness/Subjective      Ms. Bess Enamorado is a 46 year old female with a PMHx significant for with a recent COVID infection on 12/6/2021 who received monoclonal antibody, MS, chronic lymphedema, hypertension, depression, obstructive sleep apnea, PCOS, MS, admitted for fever, headache, increased leg swelling and cough found to have pneumonia, repeat positive Covid PCR, cellulitis, acute hypoxic respiratory failure and suspected CHF with a small PE.     Observed through doorway     ECG: Personally reviewed. sinus tachycardia.     ECHO:   1.Left ventricular size, wall motion and function are normal. The ejection  fraction is > 65%.  2.Moderately decreased right ventricular systolic function  3.No hemodynamically significant valvular abnormalities on 2D or color flow  imaging.  4.Technically difficult, suboptimal study due to patient size.  There is no comparison study available.     CT chest pulmonary:  IMPRESSION:  1.  Small nonocclusive right upper lobe pulmonary embolism.  2.  Pulmonary arterial hypertension.  3.  Right heart dysfunction.  4.  New right lung infiltrates compatible with pneumonia.          Physical Examination   BP  "107/63 (BP Location: Left arm, Patient Position: Semi-Stockton's)   Pulse 85   Temp 98.9  F (37.2  C) (Axillary)   Resp 20   Ht 1.626 m (5' 4\")   Wt (!) 217.7 kg (480 lb)   LMP 01/31/2022   SpO2 98%   BMI 82.39 kg/m    Body mass index is 82.39 kg/m .  Wt Readings from Last 3 Encounters:   02/08/22 (!) 217.7 kg (480 lb)   05/25/21 (!) 181.4 kg (400 lb)   03/05/21 (!) 198 kg (436 lb 8.2 oz)     Given that patient is COVD-19 positive, physical exam and ROS have been deferred. Please refer to Dr. HA's excellent note for both.       Medical History  Surgical History Family History Social History   Past Medical History:   Diagnosis Date     Acute on chronic diastolic congestive heart failure (H) 2/9/2022     Arthritis 2019    Hips     ASCUS favor benign 8/2015    Neg HPV     Depressive disorder 2010     H/O colposcopy with cervical biopsy 9/14/15    Bx & ECC - negative     Hypertension 2019     LSIL on Pap smear 7/2014    Neg high risk HPV     Multiple sclerosis (H) 8/29/2005 9/18/200pt dx with MS 2004--dx by neurolgist and is followed by Dr. Steven Ayala 10/2016     UNSPEC CONSTIPATION 9/18/2006 9/18/2006   Has tried otc suppository and otc lax.     Past Surgical History:   Procedure Laterality Date     CHOLECYSTECTOMY, LAPOROSCOPIC      Cholecystectomy, Laparoscopic     COLONOSCOPY  2007?    Bathroom issue..results normal     OPEN REDUCTION INTERNAL FIXATION TOE(S)  4/13/2012    Procedure:OPEN REDUCTION INTERNAL FIXATION TOE(S); Open reduction internal fixation right proximal fifth metatarsal fracture-   Anes-choice block; Surgeon:LEY, JEFFREY DUANE; Location:[place] OR    no family history of premature coronary artery disease Social History     Socioeconomic History     Marital status: Single     Spouse name: Not on file     Number of children: Not on file     Years of education: Not on file     Highest education level: Not on file   Occupational History     Employer: [employer]   Tobacco Use     " Smoking status: Never Smoker     Smokeless tobacco: Never Used   Vaping Use     Vaping Use: Never used   Substance and Sexual Activity     Alcohol use: Yes     Comment: social     Drug use: No     Sexual activity: Not Currently     Partners: Male     Birth control/protection: Pill   Other Topics Concern     Parent/sibling w/ CABG, MI or angioplasty before 65F 55M? No   Social History Narrative    , 3rd-5th grade     Social Determinants of Health     Financial Resource Strain: Low Risk      Difficulty of Paying Living Expenses: Not hard at all   Food Insecurity: No Food Insecurity     Worried About Running Out of Food in the Last Year: Never true     Ran Out of Food in the Last Year: Never true   Transportation Needs: No Transportation Needs     Lack of Transportation (Medical): No     Lack of Transportation (Non-Medical): No   Physical Activity: Not on file   Stress: Not on file   Social Connections: Not on file   Intimate Partner Violence: Not At Risk     Fear of Current or Ex-Partner: No     Emotionally Abused: No     Physically Abused: No     Sexually Abused: No   Housing Stability: Not on file           Lab Results    Chemistry/lipid CBC Cardiac Enzymes/BNP/TSH/INR   Lab Results   Component Value Date    CHOL 162 02/11/2016    HDL 64 02/11/2016    TRIG 113 02/11/2016    BUN 13 02/09/2022     02/09/2022    CO2 21 (L) 02/09/2022    Lab Results   Component Value Date    WBC 10.0 02/09/2022    HGB 12.7 02/09/2022    HCT 41.5 02/09/2022    MCV 88 02/09/2022     02/09/2022    Lab Results   Component Value Date    TROPONINI 0.33 (HH) 02/09/2022    BNP 2,230 (H) 02/08/2022    TSH 2.81 01/09/2012    INR 0.96 04/19/2013     Lab Results   Component Value Date    TROPONINI 0.33 (HH) 02/09/2022          Weight:    Wt Readings from Last 3 Encounters:   02/08/22 (!) 217.7 kg (480 lb)   05/25/21 (!) 181.4 kg (400 lb)   03/05/21 (!) 198 kg (436 lb 8.2 oz)       Allergies  Allergies    Allergen Reactions     Nkda [No Known Drug Allergies]          Surgical History  Past Surgical History:   Procedure Laterality Date     CHOLECYSTECTOMY, LAPOROSCOPIC      Cholecystectomy, Laparoscopic     COLONOSCOPY  2007?    Bathroom issue..results normal     OPEN REDUCTION INTERNAL FIXATION TOE(S)  4/13/2012    Procedure:OPEN REDUCTION INTERNAL FIXATION TOE(S); Open reduction internal fixation right proximal fifth metatarsal fracture-   Anes-choice block; Surgeon:LEY, JEFFREY DUANE; Location:[facility] OR       Social History  Tobacco:   History   Smoking Status     Never Smoker   Smokeless Tobacco     Never Used    Alcohol:   Social History    Substance and Sexual Activity      Alcohol use: Yes        Comment: social   Illicit Drugs:   History   Drug Use No       Family History  Family History   Problem Relation Age of Onset     Neurologic Disorder Father         MS     Heart Disease Father         irregular heart rate     Diabetes Father      Melanoma Father      Sleep Apnea Father      Other Cancer Father      Cancer Maternal Grandfather         lung     Other Cancer Maternal Grandfather      Cancer Paternal Grandmother         stomach     Other Cancer Paternal Grandmother      Cancer Mother      Other Cancer Mother      Thyroid Disease Mother      Gynecology Maternal Aunt         PCOS     Hypertension Maternal Grandmother      Anxiety Disorder Maternal Grandmother      Thyroid Disease Maternal Grandmother      Anxiety Disorder Sister           Raúl Sanchez MD on 2/10/2022      cc: Mikala Luna

## 2022-02-10 NOTE — PROGRESS NOTES
INFECTIOUS DISEASE FOLLOW UP NOTE      ASSESSMENT:  1. Fever. Most likely due to left lower extremity cellulitis. Other possibilities that this is COVID, less likely bacterial pneumonia. Improved.   2.  COVID positive. Possibilities are current infection vs residual mRNA remnants from infection in December. She had 2 vaccinations mid 2021. Certainly can see recurrent infection, and in December she would have likely had Delta variant, whereas now it would be Omicron, and the protection from previous infection and vaccine are not as good against Omicron. She is hypoxic, but lung imaging has not shown typical sign of COVID pneumonia. With acute onset fever 2/7 pm, it would be early for COVID pneumonia. CRP elevation -- not clear if this is from COVID or other process -- cellulitis. Oxygenation improved with diuresis.   3. Pulmonary embolism. Right heart strain. Positive troponin.  4. CHF. Getting diuresis.   5. MS  6. Left lower extremity cellulitis -- most suspicious for beta-hemolytic strep.   7. Positive blood cultures -- gram positive rods and coag neg staph are contaminants, not true bacteremia.        PLAN:  3 days remdesivir  Monitor O2, CRP  Broad spectrum antibiotics, will stop vancomycin, can likely de-escalate to ceftriaxone next 1-2 days.    Frank Alexis MD  [facility]  [phone] clinic  [facility] [place] p[facility]    ______________________________________________________________________    SUBJECTIVE / INTERVAL HISTORY: feels better. Temp ok. On bipap overnight, back to 6L FM today. I turned down to 4L and she maintained at 96-97%. Leg feels better. Breathing better. Some cough she attributes to nosebleed/congestion.     Her niece had home covid test over weekend, this was negative.     ROS: deconditioned. All other systems negative except as listed above.        OBJECTIVE:  /65 (BP Location: Left arm)   Pulse 84   Temp 98.2  F (36.8  C) (Axillary)   Resp 22   Ht 1.626 m (5'  "4\")   Wt (!) 179.5 kg (395 lb 12.8 oz)   LMP 2022   SpO2 95%   BMI 67.94 kg/m    FiO2 (%): 45 %    Vital Signs  Temp: 98.2  F (36.8  C)  Temp src: Axillary  Resp: 22  Pulse: 84  Pulse Rate Source: Monitor  BP: 109/65  BP Location: Left arm    Temp (24hrs), Av.6  F (37  C), Min:97.5  F (36.4  C), Max:99.8  F (37.7  C)      GENERAL:  In no acute distress, is alert and oriented to person, place, time. Tachypnea with O2 sats 95%.   EYES: No conjunctival injection, pupils equally reactive to light, extra-ocular movement intact  HEAD, EARS, NOSE, MOUTH, AND THROAT: Nontraumatic, mouth without oral ulcers.   RESPIRATORY: Clear anterior.   CARDIOVASCULAR: Regular rate and rhythm, normal S1 and S2, no murmurs, rubs, or gallops.  ABDOMEN: Soft, nontender, no masses, obese.  Normal bowel sounds.  MUSCULOSKELETAL: No synovitis.  LYMPHATIC: severe lymphedema of lower extremities.  SKIN/HAIR/NAILS: erythema bilateral LE, less tender today.  NEUROLOGIC: Grossly intact.           Antibiotics:  pip/tazo -  Vancomycin -    Pertinent labs:    Recent Labs   Lab 02/10/22  0533 22  0457 22  1620   WBC 7.0 10.0 13.6*   HGB 11.8 12.7 13.1   HCT 39.2 41.5 42.0    212 240        Recent Labs   Lab 02/10/22  0534 22  0457 22  1115    136 134*   CO2 25 21* 18*   BUN 15 13 16        Lab Results   Component Value Date    CRP 20.0 (H) 02/10/2022    CRP 21.8 (H) 2022    CRP 7.9 (H) 2022         Lab Results   Component Value Date    ALT 12 2022    AST 18 2022    ALKPHOS 40 (L) 2022         MICROBIOLOGY DATA:  Blood cultures  -- both sets with GPRs, one set coag neg staph      RADIOLOGY:  Echocardiogram Complete    Result Date: 2022  [id number]^TIANA^[place]ORJE^[place] [address]  Name: CITLALY MICHEL MRN: [id number] : 1975 Study Date: 2022 09:28 AM Age: 46 yrs Gender: Female Patient " Location: [facility] Reason For Study: CHF Ordering Physician: JORJE MONTIEL Performed By: BEN  BSA: 2.8 m2 Height: 64 in Weight: 480 lb HR: 97 ______________________________________________________________________________ Procedure Complete Portable Echo Adult. Definity (NDC #96557-665) given intravenously. No hemodynamically significant valvular abnormalities on 2D or color flow imaging. Technically difficult, suboptimal study. There is no comparison study available. ______________________________________________________________________________ Interpretation Summary  1.Left ventricular size, wall motion and function are normal. The ejection fraction is > 65%. 2.Moderately decreased right ventricular systolic function 3.No hemodynamically significant valvular abnormalities on 2D or color flow imaging. 4.Technically difficult, suboptimal study due to patient size. There is no comparison study available. ______________________________________________________________________________ I      WMSI = 1.00     % Normal = 100  X - Cannot   0 -                      (2) - Mildly 2 -          Segments  Size Interpret    Hyperkinetic 1 - Normal  Hypokinetic  Hypokinetic  1-2     small                                                    7 -          3-5    moderate 3 - Akinetic 4 -          5 -         6 - Akinetic Dyskinetic   6-14    large              Dyskinetic   Aneurysmal  w/scar       w/scar       15-16   diffuse  Left Ventricle Left ventricular size, wall motion and function are normal. The ejection fraction is > 65%. There is normal left ventricular wall thickness. Left ventricular diastolic function is normal. No regional wall motion abnormalities noted.  Right Ventricle The right ventricle is mildly dilated. Moderately decreased right ventricular systolic function. The right ventricle is not well visualized.  Atria The left atrium is not well visualized. The left atrium is mildly dilated. Right atrial size is normal.  There is no color Doppler evidence of an atrial shunt.  Mitral Valve The mitral valve is not well visualized. There is no mitral regurgitation noted. There is no mitral valve stenosis.  Tricuspid Valve The tricuspid valve is not well visualized, but is grossly normal. The tricuspid valve is not well visualized. Right ventricle systolic pressure estimate normal. There is physiologic tricuspid regurgitation. There is no tricuspid stenosis.  Aortic Valve Aortic valve leaflets appear normal. There is no evidence of aortic stenosis or clinically significant aortic regurgitation. The aortic valve is not well visualized. No aortic regurgitation is present. No aortic stenosis is present.  Pulmonic Valve The pulmonic valve is not well seen, but is grossly normal. The pulmonic valve is not well visualized. This degree of valvular regurgitation is within normal limits. There is no pulmonic valvular stenosis.  Vessels The aorta root is normal. Normal size ascending aorta. IVC diameter <2.1 cm collapsing >50% with sniff suggests a normal RA pressure of 3 mmHg.  Pericardium There is no pericardial effusion.  Rhythm Sinus rhythm was noted.  ______________________________________________________________________________ MMode/2D Measurements & Calculations Ao root diam: 3.1 cm asc Aorta Diam: 3.6 cm  LVOT diam: 2.4 cm LVOT area: 4.7 cm2  Doppler Measurements & Calculations MV E max jim: 96.7 cm/sec MV A max jim: 121.8 cm/sec MV E/A: 0.79 MV dec slope: 538.6 cm/sec2 MV dec time: 0.18 sec Ao V2 max: 138.1 cm/sec Ao max P.0 mmHg Ao V2 mean: 87.3 cm/sec Ao mean PG: 3.7 mmHg Ao V2 VTI: 25.2 cm SHANELL(I,D): 4.1 cm2 SHANELL(V,D): 4.4 cm2 LV V1 max P.8 mmHg LV V1 max: 130.4 cm/sec LV V1 VTI: 22.0 cm SV(LVOT): 103.6 ml SI(LVOT): 36.5 ml/m2 AV Jim Ratio (DI): 0.94 SHANELL Index (cm2/m2): 1.4 E/E' av.8 Lateral E/e': 18.5 Medial E/e': 15.1  ______________________________________________________________________________ Report approved by: Les  Lian Llanes 02/09/2022 10:51 AM       US Lower Extremity Venous Duplex Bilateral    Result Date: 2/8/2022  EXAM: US LOWER EXTREMITY VENOUS DUPLEX BILATERAL LOCATION: [facility] DATE/TIME: 2/8/2022 5:04 PM INDICATION: PE on CT, concern for DVT. COMPARISON: None. TECHNIQUE: Venous duplex ultrasound of bilateral lower extremities with and without compression, augmentation and duplex. Color flow and spectral Doppler with waveform analysis performed. FINDINGS: Exam includes the common femoral, femoral, popliteal veins as well as segmentally visualized deep calf veins and greater saphenous vein. RIGHT: No deep vein thrombosis. No superficial thrombophlebitis. No popliteal cyst. LEFT: No deep vein thrombosis. No superficial thrombophlebitis. No popliteal cyst.     IMPRESSION: No deep venous thrombosis in the bilateral lower extremities. Visualization of the veins is technically limited by body habitus.    CT Chest Pulmonary Embolism w Contrast    Result Date: 2/8/2022  EXAM: CT CHEST PULMONARY EMBOLISM W CONTRAST LOCATION: [facility] DATE/TIME: 2/8/2022 3:03 PM INDICATION: COVID. Dyspnea. Fever. Weakness. COMPARISON: 03/05/2021 CT TECHNIQUE: CT chest pulmonary angiogram during arterial phase injection of IV contrast. Multiplanar reformats and MIP reconstructions were performed. Dose reduction techniques were used. CONTRAST: isovue 370 100ml FINDINGS: ANGIOGRAM CHEST: Enlarged main pulmonary artery is 4 cm diameter consistent with pulmonary arterial hypertension. Small nonocclusive thrombus right upper lobe pulmonary artery (series 5, image 66). No additional thrombi seen. Thoracic aorta is negative for dissection. RV/LV ratio abnormal, greater than 1.0. LUNGS AND PLEURA: Diffuse lung mosaic attenuation, similar to previous which can be seen with pulmonary arterial hypertension. No right upper and middle lobe groundglass/consolidative infiltrates suggesting an acute  pneumonia. No pleural effusion. MEDIASTINUM/AXILLAE: No adenopathy. CORONARY ARTERY CALCIFICATION: None. UPPER ABDOMEN: Absent gallbladder MUSCULOSKELETAL: Upper chest wall venous collaterals. Spinal degenerative change.     IMPRESSION: 1.  Small nonocclusive right upper lobe pulmonary embolism. 2.  Pulmonary arterial hypertension. 3.  Right heart dysfunction. 4.  New right lung infiltrates compatible with pneumonia. 5.  Report called to Dr. Guillen at 3:30 PM.    CT Head w/o Contrast    Result Date: 2/9/2022  EXAM: CT HEAD W/O CONTRAST LOCATION: [facility] DATE/TIME: 2/9/2022 10:32 PM INDICATION: Fall with headache. COMPARISON: None. TECHNIQUE: Routine CT Head without IV contrast. Multiplanar reformats. Dose reduction techniques were used. FINDINGS: INTRACRANIAL CONTENTS: No intracranial hemorrhage, extraaxial collection, or mass effect.  No CT evidence of acute infarct. There are a few tiny foci of low-attenuations primarily posterior to the lateral ventricles which most likely represent prominent perivascular spaces. Normal ventricles and sulci. VISUALIZED ORBITS/SINUSES/MASTOIDS: No intraorbital abnormality. No paranasal sinus mucosal disease. No middle ear or mastoid effusion. BONES/SOFT TISSUES: No acute abnormality.     IMPRESSION: 1.  No CT finding of a mass, hemorrhage or focal area suggestive of acute infarct

## 2022-02-10 NOTE — PLAN OF CARE
Problem: Infection Progression (Sepsis)  Goal: Absence of Infection Signs and Symptoms  Outcome: Improving  Intervention: Initiate Sepsis Management  Recent Flowsheet Documentation  Taken 2/10/2022 0830 by Pretty Chambers RN  Infection Prevention: personal protective equipment utilized  Isolation Precautions:   airborne precautions maintained   contact precautions maintained   droplet precautions maintained  Intervention: Promote Recovery  Recent Flowsheet Documentation  Taken 2/10/2022 0830 by Pretty Chambers RN  Activity Management:   activity adjusted per tolerance   bedrest     Problem: Gas Exchange Impaired  Goal: Optimal Gas Exchange  Outcome: Improving  Intervention: Optimize Oxygenation and Ventilation  Recent Flowsheet Documentation  Taken 2/10/2022 0830 by Pretty Chambers RN  Head of Bed (HOB) Positioning: HOB at 45 degrees   Patient alert and oriented x 4. Pleasant and cooperative. No c/o pain. Remains on IV antibiotics and Lasix gtt and Heparin gtt. Next Xa 1300. Oxy mask at 6 liters and tolerating well. Does Incentive spirometer 750 ml's. Brar and rectal tube patent. Strict I and O. Call into wound nurse regarding the skin red and raw in abdominal folds and very tender to touch when she was washed up. Patient COVID and to get remdesivir today is day 2. Call light in reach.

## 2022-02-10 NOTE — PROGRESS NOTES
Care Management Follow Up    Length of Stay (days): 2    Expected Discharge Date: 02/11/2022     Concerns to be Addressed:     Medical mgmt of covid-19+ status; reduced O2; remdesivir completion; WOC, ID, and Cardiology following  Patient plan of care discussed at interdisciplinary rounds: Yes    Anticipated Discharge Disposition:  Home      Anticipated Discharge Services:  Resumption of WOC RN from [facility] 2x/week  Anticipated Discharge DME:      Referrals Placed by CM/SW:    Private pay costs discussed: Not applicable    Additional Information:  3:35 PM - Therapy recommendation is TCU. Though it is unlikely that a COVID+ TCU bed will be available.      Anticipate pt to return home with resumptions of home care RN, pending medical stability.       Assessment hx: Pt lives alone and had WOC RN w/[facility] ; she uses walker and CPap. Pt states that she has PCA hours but have not been able to get a PCA. Family to transport.       ROSELYN WinterSW

## 2022-02-10 NOTE — PLAN OF CARE
Problem: Adult Inpatient Plan of Care  Goal: Absence of Hospital-Acquired Illness or Injury  Outcome: Improving  Intervention: Identify and Manage Fall Risk  Intervention: Prevent Infection     Problem: Dysrhythmia (Acute Coronary Syndrome)  Goal: Normalized Cardiac Rhythm  Outcome: Improving     Problem: Gas Exchange Impaired  Goal: Optimal Gas Exchange  Outcome: Improving     Pt tolerated 6 L Oxymask most of shift with 02 sats 91-93%. BIPAP at night. NSR. Denied pain. On Lasix drip, good amount of UO. On Heparin drip, paused for almost 2 HR d/t nose bleeding which was resolved. Afrin spray given once. Ate well. IV Remdisivir once. Wound dressing done of the left ankle. Head CT done. Minimal output from Rectal tube.

## 2022-02-10 NOTE — PROGRESS NOTES
Patient is transported to/from CT scan without distress. Now, pt is situated and increased work of breath noted. BIPAP reinitiated; current settings: ST 14/8 12 45%; sats low 90s. Pt is able to remove the mask. RT following.    Axel Morin, RT

## 2022-02-10 NOTE — PROGRESS NOTES
Patient was on BIPAP this am and was taken off at 0830. Bipap settings are 14/8 RR12 45%.  BS were decreased. Patient is on oxymask when off Bipap.   RT will continue to monitor.  Charlee Adhikari, RT

## 2022-02-10 NOTE — PROGRESS NOTES
02/10/22 1316   Quick Adds   Type of Visit Initial PT Evaluation   Living Environment   Living Environment Comments see OT note   Self-Care   Activity/Exercise/Self-Care Comment see OT note   Disability/Function   Number of times patient has fallen within last six months 1   General Information   Onset of Illness/Injury or Date of Surgery 02/10/22   Referring Physician Efra Lopez MD   Patient/Family Therapy Goals Statement (PT) return home, does not want TCU   Pertinent History of Current Problem (include personal factors and/or comorbidities that impact the POC) a recent COVID infection on 12/2021 who received monoclonal antibody, MS, chronic lymphedema, hypertension, depression, NARCISA, PCOS, MS who admitted for fever, headache, increased leg swelling and cough found to have pneumonia, repeat positive Covid PCR, cellulitis of lower extremity, acute hypoxic respiratory failure, CHF exacerbation, CT chest with PE   Weight-Bearing Status - LLE weight-bearing as tolerated   Weight-Bearing Status - RLE weight-bearing as tolerated   Pain Assessment   Patient Currently in Pain Yes, see Vital Sign flowsheet  (L ankle/foot)   Range of Motion (ROM)   ROM Quick Adds ROM deficits secondary to pain;ROM deficits secondary to swelling;ROM deficits secondary to weakness   Strength   Manual Muscle Testing Quick Adds Deficits observed during functional mobility   Strength Comments significantt weakness BLEs, L weaker than R   Bed Mobility   Bed Mobility rolling left;rolling right;supine-sit;sit-supine   Rolling Left [place] (Bed Mobility) minimum assist (75% patient effort);moderate assist (50% patient effort)  (A x 1-2)   Rolling Right [place] (Bed Mobility) minimum assist (75% patient effort);moderate assist (50% patient effort)  (A x 1-2)   Supine-Sit [place] (Bed Mobility) contact guard;supervision  (HOB elevated, use of bed rail. inc time)   Sit-Supine [place] (Bed Mobility) maximum assist (25%  [place] [address]
Phone:  [phone]                     CARDIAC NUCLEAR IMAGING REPORT
_______________________________________________________________________________
 
Name:         TILA ERICKSON                Room:                     REG CLI
M.R.#:    [id number]     Account #:     [id number]  
Admission:    11/16/21    Attend Phys:   Surendra Easley,
Discharge:                Date of Birth: [birth year]  
Date of Service: 11/16/21 1726  Report #:      [id number]
        [id number] 
_______________________________________________________________________________
THIS REPORT FOR:
 
cc:  Surendra Riley MD, Michael D. MD Liston, Michael J. MD [facility]     
                                                                       ~
 
--------------- APPROVED REPORT --------------
 
 
Imaging Protocol: Rest Tc-99m/Stress Tc-99m 1 day
Study performed:  11/16/2021 09:30:00
 
Indication: Chest pain
Patient Location: Out-Patient
Stress Nurse: OCTAVIA Sykes Tech:AKBAR Graham
 
Ht: 5 ft 9 in  Wt: 171 lbs  BSA:  1.93 m2
    BMI:  25.24
 
Medical History
Medical History: Diabetes, Hyperlipidemia
Medications: lovastatin
Allergies: tramadol
Cardiac Risk Factors: Age, DM, Hyperlipidemia, Past Smoker
Exercise History: Sedentary
 
Resting Data
Rest SPECT myocardial perfusion imaging was performed in supine 
position 30 minutes following the intravenous injection of 9.9 mCi of 
Tc-99m Sestamibi.
Time of rest injection: 1000     Date: 11/16/2021
The images were gated to evaluate regional wall motion and calculate 
left ventricular ejection fraction. 
Administration Route: IV
 
Pharmacologic Stress
Pharmacologic stress test was performed by injecting Regadenoson 0.4 
mg IV push over 10-15 seconds immediately followed by the intravenous 
injection of 29.7 mCi of Tc-99m Sestamibi.
Time of stress injection: 1125     Date: 11/16/2021
Administration Route: IV
Gated Stress SPECT was performed 40 minutes after stress 
injection.
The images were gated to evaluate regional wall motion and calculate 
 
 
[place] [address]
Phone:  [phone]                     CARDIAC NUCLEAR IMAGING REPORT
_______________________________________________________________________________
 
Name:         TILA ERICKSON                Room:                     REG CLI
M.R.#:    [id number]     Account #:     [id number]  
Admission:    11/16/21    Attend Phys:   Surendra Easley,
Discharge:                Date of Birth: [birth year]  
Date of Service: 11/16/21 1726  Report #:      [id number]
        [id number] 
_______________________________________________________________________________
left ventricular ejection fraction. 
Stress only was performed in the Supine position.
 
Stress Test Details
Stress Test:  Pharmacologic stress testing performed using 0.4 mg of 
regadenoson per 5 mL given IV over 10 seconds.      
  Reason for pharmacologic stress test: physical limitation.      
 
HR      Max Heart Rate (APMHR): 148 bpm  
Resting HR:            72 bpm   Target HR (85% APMHR): 125 bpm  
Max HR Achieved:  91 bpm        
% of APMHR:         61         
Recovery HR:            85 bpm        
 
BP           
Resting BP:  148/91 mmHg        
Max BP:       150/60 mmHg        
Recovery BP:       144/78 mmHg        
ECG           
Resting ECG:  Sinus Rhythm        
Stress ECG:     Sinus Rhythm        
ST Change: None          
Arrhythmia:    None         
Recovery ECG: Sinus Rhythm        
Recovery ST Change: None        
Recovery Arrhythmia: None        
 
Clinical
Reason for Termination: Completed protocol
The patient tolerated Lexiscan infusion without significant cardiac 
symptoms.
 
Nurse Comments
pt has a bad back and cannot  walk on [place]
 
Stress ECG Conclusion
The baseline twelve-lead EKG shows sinus rhythm without significant 
ST segment abnormality.  EKGs obtained during and post Lexiscan 
infusion show sinus rhythm with no significant ST segment changes 
when compared to baseline.  There were occasional unifocal premature 
ventricular contractions noted.
 
Study Quality
Study: Good
Artifact: Mild Diaphragmatic artifact
 
 
 
[place] [address]
Phone:  [phone]                     CARDIAC NUCLEAR IMAGING REPORT
_______________________________________________________________________________
 
Name:         TILA ERICKSON                Room:                     REG CLI
M.R.#:    [id number]     Account #:     [id number]  
Admission:    11/16/21    Attend Phys:   Surendra Easley,
Discharge:                Date of Birth: [birth year]  
Date of Service: 11/16/21 1726  Report #:      [id number]
        [id number] 
_______________________________________________________________________________
Study Data
At rest, the left ventricular ejection fraction was 65%..   
Post stress, the left ventricular ejection was 55%..   
TID = 0.92.       
 
Perfusion
Perfusion images obtained at rest and post Lexiscan stress show a 
large region of moderate photopenia involving the basal to distal 
inferior wall that appears slightly more pronounced on stress than 
resting images.  No other significant fixed or reversible defects are 
identified.
 
Wall Motion
Global LV systolic function is preserved.  There is slight 
hypokinesis of the basal to mid inferoseptal wall.
 
Nuclear Conclusion
ECG Findings: negative for ischemia 
Clinical Findings: negative for ischemia 
Nuclear Findings: positive for ischemia 
Exercise Capacity: not assessed
Left Ventricular Function: Preserved 
Risk Study: moderate
Perfusion images suggest possible inferior infarct with chris-infarct 
ischemia.  Global LV systolic function is preserved with wall motion 
abnormalities as outlined above.  This is a moderate risk study. 
 
<Conclusion>
The baseline twelve-lead EKG shows sinus rhythm without significant 
ST segment abnormality.  EKGs obtained during and post Lexiscan 
infusion show sinus rhythm with no significant ST segment changes 
when compared to baseline.  There were occasional unifocal premature 
ventricular contractions noted.
 
 
 
 
 
 
 
 
 
 
 
  <ELECTRONICALLY SIGNED>
                                           By: Surendra Easley MD, FACC   
  11/16/21 1726
D: 11/16/21 1726   _____________________________________
T: 11/16/21 1726   Surendra Easley MD, FACC     /INF patient effort)  (assist of 2-3)   Bed Mobility Limitations decreased ability to use legs for bridging/pushing;impaired ability to control trunk for mobility   Impairments Contributing to Impaired Bed Mobility decreased strength;decreased ROM;decreased flexibility   Assistive Device (Bed Mobility) bed rails;draw sheet   Comment (Bed Mobility) assist of 2-3 to get back into bed, flat surface. Needing assist of 1 at LLE d/t inc pain, assist of 1 at back to guide into supine, partial assist of 1 at shoulders in front/assisting with lines/tubes.   Transfers   Transfer Safety Comments not tested today, pt feeling minimally dizzy with sitting EOB.   Balance   Balance other (describe)   Balance Comments sitting EOB x 10 minutes with Jony to remain on bed. LLE not on floor per pt's request. Would lean posteriorly and slide forward more. Inc cues for more upright - pt feels like she will fall then.   Clinical Impression   Criteria for Skilled Therapeutic Intervention yes, treatment indicated   PT Diagnosis (PT) impaired functional mobility, gait abnormality   Influenced by the following impairments decreased strength, decreased endurance   Functional limitations due to impairments gait, bed mobility, endurance   Clinical Presentation Evolving/Changing   Clinical Presentation Rationale pt presents as medically diagnosed   Clinical Decision Making (Complexity) moderate complexity   Therapy Frequency (PT) Daily   Predicted Duration of Therapy Intervention (days/wks) 7 days   Planned Therapy Interventions (PT) balance training;bed mobility training;gait training;home exercise program;neuromuscular re-education;patient/family education;strengthening;transfer training   Anticipated Equipment Needs at Discharge (PT) walker, rolling;wheelchair  (has both at home)   Risk & Benefits of therapy have been explained evaluation/treatment results reviewed;patient   PT Discharge Planning    PT Discharge Recommendation (DC Rec) Transitional  Care Facility;home with assist;home with home care physical therapy   PT Rationale for DC Rec to go home, pt will need to be assist of 0-1 for all mobility. Pt did not stand today and needed assist of 2-3 to get back into bed. Recommending TCU for patient safety.   Total Evaluation Time   Total Evaluation Time (Minutes) 10

## 2022-02-11 ENCOUNTER — APPOINTMENT (OUTPATIENT)
Dept: PHYSICAL THERAPY | Facility: HOSPITAL | Age: 47
DRG: 871 | End: 2022-02-11
Attending: INTERNAL MEDICINE
Payer: COMMERCIAL

## 2022-02-11 LAB
ANION GAP SERPL CALCULATED.3IONS-SCNC: 8 MMOL/L (ref 5–18)
BACTERIA BLD CULT: ABNORMAL
BACTERIA BLD CULT: ABNORMAL
BUN SERPL-MCNC: 14 MG/DL (ref 8–22)
C REACTIVE PROTEIN LHE: 13.1 MG/DL (ref 0–0.8)
CALCIUM SERPL-MCNC: 8.5 MG/DL (ref 8.5–10.5)
CHLORIDE BLD-SCNC: 99 MMOL/L (ref 98–107)
CO2 SERPL-SCNC: 33 MMOL/L (ref 22–31)
CREAT SERPL-MCNC: 0.86 MG/DL (ref 0.6–1.1)
ERYTHROCYTE [DISTWIDTH] IN BLOOD BY AUTOMATED COUNT: 15.6 % (ref 10–15)
GFR SERPL CREATININE-BSD FRML MDRD: 83 ML/MIN/1.73M2
GLUCOSE BLD-MCNC: 97 MG/DL (ref 70–125)
HCT VFR BLD AUTO: 42.3 % (ref 35–47)
HGB BLD-MCNC: 12.6 G/DL (ref 11.7–15.7)
INR PPP: 1.23 (ref 0.9–1.15)
MAGNESIUM SERPL-MCNC: 1.9 MG/DL (ref 1.8–2.6)
MCH RBC QN AUTO: 26.9 PG (ref 26.5–33)
MCHC RBC AUTO-ENTMCNC: 29.8 G/DL (ref 31.5–36.5)
MCV RBC AUTO: 90 FL (ref 78–100)
PLATELET # BLD AUTO: 219 10E3/UL (ref 150–450)
POTASSIUM BLD-SCNC: 3.4 MMOL/L (ref 3.5–5)
POTASSIUM BLD-SCNC: 3.6 MMOL/L (ref 3.5–5)
RBC # BLD AUTO: 4.68 10E6/UL (ref 3.8–5.2)
SODIUM SERPL-SCNC: 140 MMOL/L (ref 136–145)
UFH PPP CHRO-ACNC: 0.31 IU/ML
WBC # BLD AUTO: 5.9 10E3/UL (ref 4–11)

## 2022-02-11 PROCEDURE — 99232 SBSQ HOSP IP/OBS MODERATE 35: CPT | Performed by: INTERNAL MEDICINE

## 2022-02-11 PROCEDURE — 258N000003 HC RX IP 258 OP 636: Performed by: EMERGENCY MEDICINE

## 2022-02-11 PROCEDURE — 36415 COLL VENOUS BLD VENIPUNCTURE: CPT | Performed by: INTERNAL MEDICINE

## 2022-02-11 PROCEDURE — 86140 C-REACTIVE PROTEIN: CPT | Performed by: INTERNAL MEDICINE

## 2022-02-11 PROCEDURE — 250N000013 HC RX MED GY IP 250 OP 250 PS 637: Performed by: FAMILY MEDICINE

## 2022-02-11 PROCEDURE — 99233 SBSQ HOSP IP/OBS HIGH 50: CPT | Performed by: INTERNAL MEDICINE

## 2022-02-11 PROCEDURE — 85014 HEMATOCRIT: CPT | Performed by: EMERGENCY MEDICINE

## 2022-02-11 PROCEDURE — 80048 BASIC METABOLIC PNL TOTAL CA: CPT | Performed by: INTERNAL MEDICINE

## 2022-02-11 PROCEDURE — 84132 ASSAY OF SERUM POTASSIUM: CPT | Performed by: INTERNAL MEDICINE

## 2022-02-11 PROCEDURE — 250N000009 HC RX 250: Performed by: INTERNAL MEDICINE

## 2022-02-11 PROCEDURE — 999N000157 HC STATISTIC RCP TIME EA 10 MIN

## 2022-02-11 PROCEDURE — 83735 ASSAY OF MAGNESIUM: CPT | Performed by: INTERNAL MEDICINE

## 2022-02-11 PROCEDURE — 250N000011 HC RX IP 250 OP 636: Performed by: INTERNAL MEDICINE

## 2022-02-11 PROCEDURE — 258N000003 HC RX IP 258 OP 636: Performed by: INTERNAL MEDICINE

## 2022-02-11 PROCEDURE — 85610 PROTHROMBIN TIME: CPT | Performed by: INTERNAL MEDICINE

## 2022-02-11 PROCEDURE — 250N000013 HC RX MED GY IP 250 OP 250 PS 637: Performed by: INTERNAL MEDICINE

## 2022-02-11 PROCEDURE — 250N000011 HC RX IP 250 OP 636: Performed by: EMERGENCY MEDICINE

## 2022-02-11 PROCEDURE — 94660 CPAP INITIATION&MGMT: CPT

## 2022-02-11 PROCEDURE — 250N000011 HC RX IP 250 OP 636: Performed by: FAMILY MEDICINE

## 2022-02-11 PROCEDURE — 97535 SELF CARE MNGMENT TRAINING: CPT | Mod: GP | Performed by: PHYSICAL THERAPIST

## 2022-02-11 PROCEDURE — 210N000001 HC R&B IMCU HEART CARE

## 2022-02-11 PROCEDURE — 85520 HEPARIN ASSAY: CPT | Performed by: INTERNAL MEDICINE

## 2022-02-11 RX ORDER — CEFTRIAXONE 2 G/1
2 INJECTION, POWDER, FOR SOLUTION INTRAMUSCULAR; INTRAVENOUS EVERY 24 HOURS
Status: COMPLETED | OUTPATIENT
Start: 2022-02-11 | End: 2022-02-14

## 2022-02-11 RX ORDER — POTASSIUM CHLORIDE 1500 MG/1
20 TABLET, EXTENDED RELEASE ORAL ONCE
Status: COMPLETED | OUTPATIENT
Start: 2022-02-11 | End: 2022-02-11

## 2022-02-11 RX ORDER — POTASSIUM CHLORIDE 1500 MG/1
40 TABLET, EXTENDED RELEASE ORAL ONCE
Status: COMPLETED | OUTPATIENT
Start: 2022-02-11 | End: 2022-02-11

## 2022-02-11 RX ADMIN — MICONAZOLE NITRATE: 20 POWDER TOPICAL at 07:53

## 2022-02-11 RX ADMIN — GABAPENTIN 200 MG: 100 CAPSULE ORAL at 14:16

## 2022-02-11 RX ADMIN — SERTRALINE HYDROCHLORIDE 100 MG: 50 TABLET ORAL at 07:52

## 2022-02-11 RX ADMIN — POTASSIUM CHLORIDE 40 MEQ: 1500 TABLET, EXTENDED RELEASE ORAL at 07:52

## 2022-02-11 RX ADMIN — PIPERACILLIN SODIUM AND TAZOBACTAM SODIUM 3.38 G: 3; .375 INJECTION, POWDER, LYOPHILIZED, FOR SOLUTION INTRAVENOUS at 02:38

## 2022-02-11 RX ADMIN — FUROSEMIDE 10 MG/HR: 10 INJECTION, SOLUTION INTRAVENOUS at 17:35

## 2022-02-11 RX ADMIN — SODIUM CHLORIDE 50 ML: 9 INJECTION, SOLUTION INTRAVENOUS at 17:42

## 2022-02-11 RX ADMIN — RIVAROXABAN 15 MG: 15 TABLET, FILM COATED ORAL at 17:42

## 2022-02-11 RX ADMIN — HEPARIN SODIUM 3050 UNITS/HR: 1000 INJECTION, SOLUTION INTRAVENOUS; SUBCUTANEOUS at 04:12

## 2022-02-11 RX ADMIN — MICONAZOLE NITRATE: 20 POWDER TOPICAL at 19:17

## 2022-02-11 RX ADMIN — REMDESIVIR 100 MG: 100 INJECTION, POWDER, LYOPHILIZED, FOR SOLUTION INTRAVENOUS at 17:35

## 2022-02-11 RX ADMIN — METOPROLOL SUCCINATE 25 MG: 25 TABLET, EXTENDED RELEASE ORAL at 07:52

## 2022-02-11 RX ADMIN — GABAPENTIN 200 MG: 100 CAPSULE ORAL at 07:52

## 2022-02-11 RX ADMIN — PIPERACILLIN SODIUM AND TAZOBACTAM SODIUM 3.38 G: 3; .375 INJECTION, POWDER, LYOPHILIZED, FOR SOLUTION INTRAVENOUS at 09:24

## 2022-02-11 RX ADMIN — CEFTRIAXONE SODIUM 2 G: 2 INJECTION, POWDER, FOR SOLUTION INTRAMUSCULAR; INTRAVENOUS at 14:22

## 2022-02-11 RX ADMIN — GABAPENTIN 200 MG: 100 CAPSULE ORAL at 19:17

## 2022-02-11 RX ADMIN — POTASSIUM CHLORIDE 20 MEQ: 1500 TABLET, EXTENDED RELEASE ORAL at 17:42

## 2022-02-11 RX ADMIN — FUROSEMIDE 10 MG/HR: 10 INJECTION, SOLUTION INTRAVENOUS at 03:43

## 2022-02-11 ASSESSMENT — ACTIVITIES OF DAILY LIVING (ADL)
ADLS_ACUITY_SCORE: 21
ADLS_ACUITY_SCORE: 23
ADLS_ACUITY_SCORE: 23
ADLS_ACUITY_SCORE: 24
ADLS_ACUITY_SCORE: 23
ADLS_ACUITY_SCORE: 24
ADLS_ACUITY_SCORE: 23
ADLS_ACUITY_SCORE: 23
ADLS_ACUITY_SCORE: 24

## 2022-02-11 ASSESSMENT — MIFFLIN-ST. JEOR: SCORE: 2368.62

## 2022-02-11 NOTE — PLAN OF CARE
Problem: Adult Inpatient Plan of Care  Goal: Plan of Care Review  Outcome: Improving     No acute issues overnight.  VSS, afebrile. Pt tolerated BIPAP overnight on 40% FIO2 maintaining saturation in high 90's.  Pt continues on Heparin drip at 3050 units/hr with Anti Xa this AM.  Lasix continues on at 10mg/hr, diuresing well.    Pt prefers to lay on back, does not want to be repositioned when offered. Buttocks and sacral area blanchable redness.    Rectal tube with no new output since last charted on day shift yesterday. Will pass on to day shift RN to remove if still no output at end of day shift today per rectal tube standing orders.

## 2022-02-11 NOTE — PLAN OF CARE
Problem: Adult Inpatient Plan of Care  Goal: Plan of Care Review  Outcome: Improving  Goal: Patient-Specific Goal (Individualized)  Outcome: Improving     Problem: Adjustment to Illness (Sepsis/Septic Shock)  Goal: Optimal Coping  Outcome: Improving     Problem: Glycemic Control Impaired (Sepsis/Septic Shock)  Goal: Blood Glucose Level Within Desired Range  Outcome: Improving   Bess had a good shift. Minimal complaints of pain unless moving. Most painful areas in skin folds. Folds cleaned well and powder and intradry. Brar with good output. Rectal tube with minimal output noted this shift. Message left form MD. Heparin and lasix running per orders

## 2022-02-11 NOTE — PROGRESS NOTES
INFECTIOUS DISEASE FOLLOW UP NOTE      ASSESSMENT:  1. Fever. Most likely due to left lower extremity cellulitis. Other possibilities that this is COVID, less likely bacterial pneumonia. Improved.   2.  COVID positive. Possibilities are current infection vs residual mRNA remnants from infection in December. She had 2 vaccinations mid 2021. Certainly can see recurrent infection, and in December she would have likely had Delta variant, whereas now it would be Omicron, and the protection from previous infection and vaccine are not as good against Omicron. She is hypoxic, but lung imaging has not shown typical sign of COVID pneumonia. With acute onset fever 2/7 pm, it would be early for COVID pneumonia. CRP elevation -- not clear if this is from COVID or other process -- cellulitis. CRP improved.  Oxygenation improved with diuresis.   3. Pulmonary embolism. Right heart strain. Positive troponin.  4. CHF. Getting diuresis.   5. MS  6. Left lower extremity cellulitis -- most suspicious for beta-hemolytic strep.   7. Positive blood cultures -- gram positive rods and coag neg staph are contaminants, not true bacteremia.        PLAN:  3 days remdesivir completing today  Wean O2 as tolerated.   Monitor O2, CRP, if worse would add dexamethasone  ceftriaxone    Frank Alexis MD  [facility]  [phone] clinic  [facility] [place] paging    ______________________________________________________________________    SUBJECTIVE / INTERVAL HISTORY: feels better. Temp ok. Legs feel better. Has cough that she attributes to post-nasal drainage/nose-bleeds. I turned O2 by face mask down to 4L, and sats stayed in mid to high 90s. She is hesitant to be on steroid, notes that she had a cellulitis episode after steroid course previously.     Her niece had home covid test over weekend, this was negative.     ROS: deconditioned. All other systems negative except as listed above.        OBJECTIVE:  /72   Pulse 84   Temp  "97.8  F (36.6  C) (Oral)   Resp 18   Ht 1.626 m (5' 4\")   Wt (!) 174.9 kg (385 lb 8 oz)   LMP 2022   SpO2 92%   BMI 66.17 kg/m    FiO2 (%): 45 %    Vital Signs  Temp: 98.2  F (36.8  C)  Temp src: Axillary  Resp: 22  Pulse: 84  Pulse Rate Source: Monitor  BP: 109/65  BP Location: Left arm        GENERAL:  In no acute distress, is alert and oriented to person, place, time. Tachypnea with O2 sats 95%.   EYES: No conjunctival injection, pupils equally reactive to light, extra-ocular movement intact  HEAD, EARS, NOSE, MOUTH, AND THROAT: Nontraumatic, mouth without oral ulcers.   RESPIRATORY: Clear anterior.   CARDIOVASCULAR: Regular rate and rhythm, normal S1 and S2, no murmurs, rubs, or gallops.  ABDOMEN: Soft, nontender, no masses, obese.  Normal bowel sounds.  MUSCULOSKELETAL: No synovitis.  LYMPHATIC: severe lymphedema of lower extremities.  SKIN/HAIR/NAILS: erythema bilateral LE, less tender today. Wound at left lateral malleolus into subQ tissue, tender.   NEUROLOGIC: Grossly intact.           Antibiotics:  pip/tazo -  Vancomycin -10    Pertinent labs:    Recent Labs   Lab 22  0502 02/10/22  0533 22  0457   WBC 5.9 7.0 10.0   HGB 12.6 11.8 12.7   HCT 42.3 39.2 41.5    213 212        Recent Labs   Lab 22  0502 02/10/22  0534 22  0457    138 136   CO2 33* 25 21*   BUN 14 15 13        Lab Results   Component Value Date    CRP 13.1 (H) 2022    CRP 20.0 (H) 02/10/2022    CRP 21.8 (H) 2022         Lab Results   Component Value Date    ALT 12 2022    AST 18 2022    ALKPHOS 40 (L) 2022         MICROBIOLOGY DATA:  Blood cultures  -- both sets with corynebacterium striatum, one set coag neg staph      RADIOLOGY:  Echocardiogram Complete    Result Date: 2022  [id number]^TIANA^JORJE^[place] [address]  Name: CITLALY MICHEL DILIA MRN: [id number] : 1975 Study Date: " 02/09/2022 09:28 AM Age: 46 yrs Gender: Female Patient Location: [facility] Reason For Study: CHF Ordering Physician: JORJE MONTIEL Performed By:   BSA: 2.8 m2 Height: 64 in Weight: 480 lb HR: 97 ______________________________________________________________________________ Procedure Complete Portable Echo Adult. Definity ([facility]C #43514-823) given intravenously. No hemodynamically significant valvular abnormalities on 2D or color flow imaging. Technically difficult, suboptimal study. There is no comparison study available. ______________________________________________________________________________ Interpretation Summary  1.Left ventricular size, wall motion and function are normal. The ejection fraction is > 65%. 2.Moderately decreased right ventricular systolic function 3.No hemodynamically significant valvular abnormalities on 2D or color flow imaging. 4.Technically difficult, suboptimal study due to patient size. There is no comparison study available. ______________________________________________________________________________ I      WMSI = 1.00     % Normal = 100  X - Cannot   0 -                      (2) - Mildly 2 -          Segments  Size Interpret    Hyperkinetic 1 - Normal  Hypokinetic  Hypokinetic  1-2     small                                                    7 -          3-5    moderate 3 - Akinetic 4 -          5 -         6 - Akinetic Dyskinetic   6-14    large              Dyskinetic   Aneurysmal  w/scar       w/scar       15-16   diffuse  Left Ventricle Left ventricular size, wall motion and function are normal. The ejection fraction is > 65%. There is normal left ventricular wall thickness. Left ventricular diastolic function is normal. No regional wall motion abnormalities noted.  Right Ventricle The right ventricle is mildly dilated. Moderately decreased right ventricular systolic function. The right ventricle is not well visualized.  Atria The left atrium is not well visualized. The left  atrium is mildly dilated. Right atrial size is normal. There is no color Doppler evidence of an atrial shunt.  Mitral Valve The mitral valve is not well visualized. There is no mitral regurgitation noted. There is no mitral valve stenosis.  Tricuspid Valve The tricuspid valve is not well visualized, but is grossly normal. The tricuspid valve is not well visualized. Right ventricle systolic pressure estimate normal. There is physiologic tricuspid regurgitation. There is no tricuspid stenosis.  Aortic Valve Aortic valve leaflets appear normal. There is no evidence of aortic stenosis or clinically significant aortic regurgitation. The aortic valve is not well visualized. No aortic regurgitation is present. No aortic stenosis is present.  Pulmonic Valve The pulmonic valve is not well seen, but is grossly normal. The pulmonic valve is not well visualized. This degree of valvular regurgitation is within normal limits. There is no pulmonic valvular stenosis.  Vessels The aorta root is normal. Normal size ascending aorta. IVC diameter <2.1 cm collapsing >50% with sniff suggests a normal RA pressure of 3 mmHg.  Pericardium There is no pericardial effusion.  Rhythm Sinus rhythm was noted.  ______________________________________________________________________________ MMode/2D Measurements & Calculations Ao root diam: 3.1 cm asc Aorta Diam: 3.6 cm  LVOT diam: 2.4 cm LVOT area: 4.7 cm2  Doppler Measurements & Calculations MV E max jim: 96.7 cm/sec MV A max jim: 121.8 cm/sec MV E/A: 0.79 MV dec slope: 538.6 cm/sec2 MV dec time: 0.18 sec Ao V2 max: 138.1 cm/sec Ao max P.0 mmHg Ao V2 mean: 87.3 cm/sec Ao mean PG: 3.7 mmHg Ao V2 VTI: 25.2 cm SHANELL(I,D): 4.1 cm2 SHANELL(V,D): 4.4 cm2 LV V1 max P.8 mmHg LV V1 max: 130.4 cm/sec LV V1 VTI: 22.0 cm SV(LVOT): 103.6 ml SI(LVOT): 36.5 ml/m2 AV Jim Ratio (DI): 0.94 SHANELL Index (cm2/m2): 1.4 E/E' av.8 Lateral E/e': 18.5 Medial E/e': 15.1   ______________________________________________________________________________ Report approved by: Lian Khoury 02/09/2022 10:51 AM       US Lower Extremity Venous Duplex Bilateral    Result Date: 2/8/2022  EXAM: US LOWER EXTREMITY VENOUS DUPLEX BILATERAL LOCATION: [facility] DATE/TIME: 2/8/2022 5:04 PM INDICATION: PE on CT, concern for DVT. COMPARISON: None. TECHNIQUE: Venous duplex ultrasound of bilateral lower extremities with and without compression, augmentation and duplex. Color flow and spectral Doppler with waveform analysis performed. FINDINGS: Exam includes the common femoral, femoral, popliteal veins as well as segmentally visualized deep calf veins and greater saphenous vein. RIGHT: No deep vein thrombosis. No superficial thrombophlebitis. No popliteal cyst. LEFT: No deep vein thrombosis. No superficial thrombophlebitis. No popliteal cyst.     IMPRESSION: No deep venous thrombosis in the bilateral lower extremities. Visualization of the veins is technically limited by body habitus.    CT Chest Pulmonary Embolism w Contrast    Result Date: 2/8/2022  EXAM: CT CHEST PULMONARY EMBOLISM W CONTRAST LOCATION: [facility] DATE/TIME: 2/8/2022 3:03 PM INDICATION: COVID. Dyspnea. Fever. Weakness. COMPARISON: 03/05/2021 CT TECHNIQUE: CT chest pulmonary angiogram during arterial phase injection of IV contrast. Multiplanar reformats and MIP reconstructions were performed. Dose reduction techniques were used. CONTRAST: isovue 370 100ml FINDINGS: ANGIOGRAM CHEST: Enlarged main pulmonary artery is 4 cm diameter consistent with pulmonary arterial hypertension. Small nonocclusive thrombus right upper lobe pulmonary artery (series 5, image 66). No additional thrombi seen. Thoracic aorta is negative for dissection. RV/LV ratio abnormal, greater than 1.0. LUNGS AND PLEURA: Diffuse lung mosaic attenuation, similar to previous which can be seen with pulmonary arterial  hypertension. No right upper and middle lobe groundglass/consolidative infiltrates suggesting an acute pneumonia. No pleural effusion. MEDIASTINUM/AXILLAE: No adenopathy. CORONARY ARTERY CALCIFICATION: None. UPPER ABDOMEN: Absent gallbladder MUSCULOSKELETAL: Upper chest wall venous collaterals. Spinal degenerative change.     IMPRESSION: 1.  Small nonocclusive right upper lobe pulmonary embolism. 2.  Pulmonary arterial hypertension. 3.  Right heart dysfunction. 4.  New right lung infiltrates compatible with pneumonia. 5.  Report called to Dr. Guillen at 3:30 PM.    CT Head w/o Contrast    Result Date: 2/9/2022  EXAM: CT HEAD W/O CONTRAST LOCATION: [facility] DATE/TIME: 2/9/2022 10:32 PM INDICATION: Fall with headache. COMPARISON: None. TECHNIQUE: Routine CT Head without IV contrast. Multiplanar reformats. Dose reduction techniques were used. FINDINGS: INTRACRANIAL CONTENTS: No intracranial hemorrhage, extraaxial collection, or mass effect.  No CT evidence of acute infarct. There are a few tiny foci of low-attenuations primarily posterior to the lateral ventricles which most likely represent prominent perivascular spaces. Normal ventricles and sulci. VISUALIZED ORBITS/SINUSES/MASTOIDS: No intraorbital abnormality. No paranasal sinus mucosal disease. No middle ear or mastoid effusion. BONES/SOFT TISSUES: No acute abnormality.     IMPRESSION: 1.  No CT finding of a mass, hemorrhage or focal area suggestive of acute infarct

## 2022-02-11 NOTE — PROVIDER NOTIFICATION
02/11/22 0415   Tech Time   $Tech Time (10 minute increments) 2   Mode: CPAP/ BiPAP/ AVAPS/ AVAPS AE   CPAP/BiPAP/ AVAPS/ AVAPS AE Mode BiPAP S/T   Equipment   Device Serial Number [id number]   CPAP/BiPAP/Settings   $BIPAP/CPAP Therapy continuous   BIPAP/CPAP On Standby On   IPAP/EPAP (cmH2O) 14/8   Rate (breaths/min) 12   Oxygen (%) 40   Timed Inspiration (sec) 0.9   IPAP RISE  Settings (V60) 3   Pt placed on Bipap for night NARCISA

## 2022-02-11 NOTE — PROGRESS NOTES
[facility]:  PULMONARY PROGRESS NOTE     Date / Time of Admission:  2/8/2022 10:54 AM    ID: Bess Enamorado is a 46 year old female with severe morbid obesity (BMI 67.94 kg/m2) with physical deconditioning, obstructive sleep apnea on CPAP (consistently since Summer 2021), essential hypertension, multiple sclerosis, polycystic ovarian syndrome, history of teratoma - monitoring, chronic lymphedema, and prior diagnosis of COVID-19 12/6/2021 s/p monoclonal antibody who was admitted to [facility] on 2/8/2022 with concern for recurrent COVID pneumonia and possible superimposed bacterial infection, small R upper lobe pulmonary embolism, respiratory failure intermittently requiring NIPPV, and lower extremity cellulitis.       Reason for Consult:  Respiratory failure         Assessment: 1.   Acute respiratory failure with hypoxia and hypercapnia.  Multifactorial including volume overload/pulmonary edema with CHF exacerbation, possible COVID-19 viral pneumonia (although less likely) and/or superimposed bacterial pneumonia.  Patient also with small RUL pulmonary embolism, although unlikely to contribute significantly to symptoms.  Has severe morbid obesity, with underlying NARCISA on CPAP.  2. COVID-19 viral infection and possible pneumonia.  Prior history of COVID-19 12/6/2021, received monoclonal antibody.  Now with repeat positivity, question if there is a new strain present or if this is residual from prior event.    3. Small right upper lung subsegmental pulmonary embolism.  Noted on CT chest 2/8.  Duplex ultrasound negative for DVT in lower extremities.  Given recurrent Covid infections, likely hypercoagulable in the state.  Also, has had severe immobility due to body habitus.  4. Obstructive sleep apnea on CPAP.  Diagnosed sometime ago, states she has been consistently using CPAP since summer 2021.  5. Acute CHF exacerbation with moderate RV dysfunction.  RV dysfunction - would not expect any RV  dysfunction with the small PE that this patient has.  May have alternative causes of abnormal RV including NARCISA, heart failure.  Would recommend repeat TTE as outpatient in 3 months, at that time can assess extent of RV dysfunction and identify further evaluation needs.  Her body habitus is extreme and precludes much investigation.  6. Bacteremia.  Discussed with ID, Corynebacterium may be contaminant.   Blood cultures x 2, 2/8:  2 of 4 bottles + Corynebacterium striatum, 1 of 2 bottles + Staphylococcus epidermidis         Plan:     Wean supplemental O2 as tolerates, goal O2 sat > 92%.     NIPPV at night for NARCISA management - transition to home CPAP.  Has at bedside.    Discontinued NIPPV order    Started home CPAP order    On Zosyn IV for pneumonia/cellulitis/bacteremia per ID.    COVID management per ID: Remdesivir.  If O2 needs worsening, will add dexamethasone.     Doing great with diuresis per Cardiology.    Will defer electrolyte management/monitoring to Cardiology as they are managing diuretics.    Recommend 3 months of anticoagulation for PE.      Can switch from heparin gtt to oral therapy now.  Discussed with Dr. Knight - he will review with pharmacy about medication costs and switch to oral therapy.    Recommend repeat TTE in 3 months after treatment of PE, can re-assess RV at that time and determine additional follow-up needs.     Pulmonary hygiene.  Encourage incentive spirometry Q2H while awake.      Discussed with patient to also use when goes home given body habitus, increased risk of hypoventilation/atelectasis.    Patient and/or family were educated on the above recommendations.   Thank you for allowing our service to participate in the care of this patient.  Please call with any questions or concerns.    Total patient care time: 35 minutes, with over 50% spent in counseling/coordination of care.      Hilda Benedict MD, MPH  Pulmonary/Critical Care Medicine  02/11/2022  11:43 AM         "Subjective/Interval History:   Patient feels better today.  Pulmonary sx were not such an issue before - it was fevers.  But noticed slightly better breathing.  Also feels her legs are less heavy overall - able to help RN during bedside turns.  Is anxious about going home without adequate resources.     Review of Systems:  Pertinent items are noted in HPI.        Allergies/Medications:   Allergies:     Allergies   Allergen Reactions     Nkda [No Known Drug Allergies]        Continuous Infusions:    furosemide (LASIX) infusion ADULT STANDARD 10 mg/hr (02/11/22 6766)     heparin 3,050 Units/hr (02/11/22 3682)     - MEDICATION INSTRUCTIONS -       Scheduled Medications:    remdesivir  100 mg Intravenous Q24H    And     sodium chloride 0.9%  50 mL Intravenous Q24H     gabapentin  200 mg Oral TID     influenza quadrivalent (PF) vacc  0.5 mL Intramuscular Prior to discharge     lisinopril  10 mg Oral Daily     metoprolol succinate ER  25 mg Oral Daily     miconazole   Topical BID     piperacillin-tazobactam  3.375 g Intravenous Q8H     sertraline  100 mg Oral Daily     sodium chloride (PF)  3 mL Intravenous Q8H            Objective:   Vitals:  /77 (BP Location: Left arm, Patient Position: Semi-Stockton's)   Pulse 78   Temp 98.2  F (36.8  C) (Axillary)   Resp 17   Ht 1.626 m (5' 4\")   Wt (!) 174.9 kg (385 lb 8 oz)   LMP 01/31/2022   SpO2 99%   BMI 66.17 kg/m    GEN: Pleasant female, morbidly obese, no acute distress, on Oxymask.  HEENT: Normocephalic, atraumatic.  Extraoccular eye movements intact, anicteric sclera. Moist mucous membranes.   NECK: Supple.    PULM: Non-labored breathing.  No use of accessory muscles.  Diminished breath sounds.  Fine inspiratory wheeze anterior right lung.  CVS: Regular rate and rhythm.  Normal S1, S2.  No rubs, murmurs, or gallops.    ABDOMEN: Hypooactive bowel sounds.  Obese.  Soft. Non-distended.    EXTREMITES:  No clubbing, cyanosis.  Legs just wrapped in dressings.  NEURO:  " Awake.  Oriented to person, place, time and situation.  Cranial nerves 2-12 grossly intact.      Intake/Output:  I/O last 3 completed shifts:  In: 2239 [P.O.:840; I.V.:1399]  Out: 6250 [Urine:6100; Stool:150]        Pertinent Studies:   All laboratory data reviewed  Serum Glucose range:   Recent Labs   Lab 02/11/22  0502 02/10/22  0534 02/09/22  0457 02/08/22  1115   GLC 97 107 116 116     ABG:   Recent Labs   Lab 02/08/22  2329   PH 7.46*   PCO2 34*   PO2 63*   HCO3 25     CBC:   Recent Labs   Lab 02/11/22  0502 02/10/22  0533 02/09/22  0457 02/08/22  1620 02/08/22  1115   WBC 5.9 7.0 10.0   < > 15.3*   HGB 12.6 11.8 12.7   < > 13.7   HCT 42.3 39.2 41.5   < > 43.1   MCV 90 90 88   < > 86    213 212   < > 269   NEUTROPHIL  --   --   --   --  86   LYMPH  --   --   --   --  8   MONOCYTE  --   --   --   --  5   EOSINOPHIL  --   --   --   --  0    < > = values in this interval not displayed.     Chemistry:   Recent Labs   Lab 02/11/22  0502 02/10/22  1950 02/10/22  0534 02/09/22  0457 02/08/22  1115     --  138 136 134*   POTASSIUM 3.4* 3.6 3.5 3.8 4.3   CHLORIDE 99  --  103 104 105   CO2 33*  --  25 21* 18*   BUN 14  --  15 13 16   CR 0.86 0.81 0.92 0.82 0.76   GFRESTIMATED 83 90 77 88 >90   EVITA 8.5  --  8.3* 9.0 9.5   MAG 1.9 2.0  --  2.3 1.6*   PROTTOTAL  --   --   --   --  7.3   ALBUMIN  --   --   --   --  3.1*   AST  --   --   --   --  18   ALT  --   --   --   --  12   ALKPHOS  --   --   --   --  40*   BILITOTAL  --   --   --   --  2.3*     Coags:  Recent Labs   Lab 02/11/22  0502   INR 1.23*     Cardiac Markers:  Recent Labs   Lab 02/09/22  0457   TROPONINI 0.33*      Microbiology:  Blood cultures x 2, 2/8:  2 of 4 bottles + Corynebacterium striatum, 1 of 2 bottles + Staphylococcus epidermidis        Cardiology/Radiology:   Cardiac: All cardiac studies reviewed by me.    EKG:  Reviewed    TTE, 2/9/22:  Summary:  1.Left ventricular size, wall motion and function are normal. The ejection fraction is >  65%. 2.Moderately decreased right ventricular systolic function. 3.No hemodynamically significant valvular abnormalities on 2D or color flow imaging. 4.Technically difficult, suboptimal study due to patient size. There is no comparison study available.    Radiology: All imaging studies reviewed by me.    CT chest PE study, 2/8/22:   FINDINGS: ANGIOGRAM CHEST: Enlarged main pulmonary artery is 4 cm diameter consistent with pulmonary arterial hypertension. Small nonocclusive thrombus right upper lobe pulmonary artery (series 5, image 66). No additional thrombi seen.  Thoracic aorta is negative for dissection. RV/LV ratio abnormal, greater than 1.0. LUNGS AND PLEURA: Diffuse lung mosaic attenuation, similar to previous which can be seen with pulmonary arterial hypertension. No right upper and middle lobe groundglass/consolidative infiltrates suggesting an acute pneumonia. No pleural effusion.  MEDIASTINUM/AXILLAE: No adenopathy.  CORONARY ARTERY CALCIFICATION: None.  UPPER ABDOMEN: Absent gallbladder  MUSCULOSKELETAL: Upper chest wall venous collaterals. Spinal degenerative change.      IMPRESSION: 1.  Small nonocclusive right upper lobe pulmonary embolism. 2.  Pulmonary arterial hypertension. 3.  Right heart dysfunction. 4.  New right lung infiltrates compatible with pneumonia.

## 2022-02-11 NOTE — PROGRESS NOTES
HEART CARE NOTE          Assessment/Recommendations        1. HFpEF/RV dysfunction c/b ADHF  Assessment / Plan  Echo significant for RV dysfunction in the  of PE  Diuresing well on current regimen; no changes at time  GDMT pending echo results; will need repeat echo to reassess RV function and RVSP in several months which may determine the need for VQ scan to assess for chromic PEs     2. Elevated troponin  Assessment / Plan  Mildly elevated in the setting of ADHF, COVID-19 infection and PE and likely associated RV strain; now trending down  Will hold off on further ischemic evaluation at this time; can consider stress testing once COVID-19 infection has been treated     3. COVID-19 infection  Assessment / Plan  Management primary team     Start: 8:25 am  Time: 8:45 am    History of Present Illness/Subjective      Ms. Bess Enamorado is a 46 year old female with a PMHx significant for with a recent COVID infection on 12/6/2021 who received monoclonal antibody, MS, chronic lymphedema, hypertension, depression, obstructive sleep apnea, PCOS, MS, admitted for fever, headache, increased leg swelling and cough found to have pneumonia, repeat positive Covid PCR, cellulitis, acute hypoxic respiratory failure and suspected CHF with a small PE.     Observed through doorway     ECG: Personally reviewed. sinus tachycardia.     ECHO:   1.Left ventricular size, wall motion and function are normal. The ejection  fraction is > 65%.  2.Moderately decreased right ventricular systolic function  3.No hemodynamically significant valvular abnormalities on 2D or color flow  imaging.  4.Technically difficult, suboptimal study due to patient size.  There is no comparison study available.     CT chest pulmonary:  IMPRESSION:  1.  Small nonocclusive right upper lobe pulmonary embolism.  2.  Pulmonary arterial hypertension.  3.  Right heart dysfunction.  4.  New right lung infiltrates compatible with pneumonia.          Physical Examination  "  /79 (BP Location: Left arm, Patient Position: Semi-[place]'s)   Pulse 76   Temp 98.1  F (36.7  C) (Axillary)   Resp 20   Ht 1.626 m (5' 4\")   Wt (!) 179.5 kg (395 lb 12.8 oz)   LMP 01/31/2022   SpO2 98%   BMI 67.94 kg/m    Body mass index is 67.94 kg/m .  Wt Readings from Last 3 Encounters:   02/10/22 (!) 179.5 kg (395 lb 12.8 oz)   05/25/21 (!) 181.4 kg (400 lb)   03/05/21 (!) 198 kg (436 lb 8.2 oz)     Given that patient is COVD-19 positive, physical exam and ROS have been deferred. Please refer to Dr. HA's excellent note for both.         Medical History  Surgical History Family History Social History   Past Medical History:   Diagnosis Date     Acute on chronic diastolic congestive heart failure (H) 2/9/2022     Arthritis 2019    Hips     ASCUS favor benign 8/2015    Neg HPV     Depressive disorder 2010     H/O colposcopy with cervical biopsy 9/14/15    Bx & ECC - negative     Hypertension 2019     LSIL on Pap smear 7/2014    Neg high risk HPV     Multiple sclerosis (H) 8/29/2005 9/18/200pt dx with MS 2004--dx by neurolgist and is followed by Dr. Steven Ayala 10/2016     UNSPEC CONSTIPATION 9/18/2006 9/18/2006   Has tried otc suppository and otc lax.     Past Surgical History:   Procedure Laterality Date     CHOLECYSTECTOMY, LAPOROSCOPIC      Cholecystectomy, Laparoscopic     COLONOSCOPY  2007?    Bathroom issue..results normal     OPEN REDUCTION INTERNAL FIXATION TOE(S)  4/13/2012    Procedure:OPEN REDUCTION INTERNAL FIXATION TOE(S); Open reduction internal fixation right proximal fifth metatarsal fracture-   Anes-choice block; Surgeon:LEY, JEFFREY DUANE; Location:[facility] OR    no family history of premature coronary artery disease Social History     Socioeconomic History     Marital status: Single     Spouse name: Not on file     Number of children: Not on file     Years of education: Not on file     Highest education level: Not on file   Occupational History     Employer: [employer]   Tobacco Use     Smoking status: Never Smoker     Smokeless tobacco: Never Used   Vaping Use     Vaping Use: Never used   Substance and Sexual Activity     Alcohol use: Yes     Comment: social     Drug use: No     Sexual activity: Not Currently     Partners: Male     Birth control/protection: Pill   Other Topics Concern     Parent/sibling w/ CABG, MI or angioplasty before 65F 55M? No   Social History Narrative    , 3rd-5th grade     Social Determinants of Health     Financial Resource Strain: Low Risk      Difficulty of Paying Living Expenses: Not hard at all   Food Insecurity: No Food Insecurity     Worried About Running Out of Food in the Last Year: Never true     Ran Out of Food in the Last Year: Never true   Transportation Needs: No Transportation Needs     Lack of Transportation (Medical): No     Lack of Transportation (Non-Medical): No   Physical Activity: Not on file   Stress: Not on file   Social Connections: Not on file   Intimate Partner Violence: Not At Risk     Fear of Current or Ex-Partner: No     Emotionally Abused: No     Physically Abused: No     Sexually Abused: No   Housing Stability: Not on file           Lab Results    Chemistry/lipid CBC Cardiac Enzymes/BNP/TSH/INR   Lab Results   Component Value Date    CHOL 162 02/11/2016    HDL 64 02/11/2016    TRIG 113 02/11/2016    BUN 15 02/10/2022     02/10/2022    CO2 25 02/10/2022    Lab Results   Component Value Date    WBC 7.0 02/10/2022    HGB 11.8 02/10/2022    HCT 39.2 02/10/2022    MCV 90 02/10/2022     02/10/2022    Lab Results   Component Value Date    TROPONINI 0.33 (HH) 02/09/2022    BNP 2,230 (H) 02/08/2022    TSH 2.81 01/09/2012    INR 0.96 04/19/2013     Lab Results   Component Value Date    TROPONINI 0.33 (HH) 02/09/2022          Weight:    Wt Readings from Last 3 Encounters:   02/10/22 (!) 179.5 kg (395 lb 12.8 oz)   05/25/21 (!) 181.4 kg (400 lb)   03/05/21 (!) 198 kg (436 lb 8.2 oz)        Allergies  Allergies   Allergen Reactions     Nkda [No Known Drug Allergies]          Surgical History  Past Surgical History:   Procedure Laterality Date     CHOLECYSTECTOMY, LAPOROSCOPIC      Cholecystectomy, Laparoscopic     COLONOSCOPY  2007?    Bathroom issue..results normal     OPEN REDUCTION INTERNAL FIXATION TOE(S)  4/13/2012    Procedure:OPEN REDUCTION INTERNAL FIXATION TOE(S); Open reduction internal fixation right proximal fifth metatarsal fracture-   Anes-choice block; Surgeon:LEY, JEFFREY DUANE; Location:[facility] OR       Social History  Tobacco:   History   Smoking Status     Never Smoker   Smokeless Tobacco     Never Used    Alcohol:   Social History    Substance and Sexual Activity      Alcohol use: Yes        Comment: social   Illicit Drugs:   History   Drug Use No       Family History  Family History   Problem Relation Age of Onset     Neurologic Disorder Father         MS     Heart Disease Father         irregular heart rate     Diabetes Father      Melanoma Father      Sleep Apnea Father      Other Cancer Father      Cancer Maternal Grandfather         lung     Other Cancer Maternal Grandfather      Cancer Paternal Grandmother         stomach     Other Cancer Paternal Grandmother      Cancer Mother      Other Cancer Mother      Thyroid Disease Mother      Gynecology Maternal Aunt         PCOS     Hypertension Maternal Grandmother      Anxiety Disorder Maternal Grandmother      Thyroid Disease Maternal Grandmother      Anxiety Disorder Sister           Raúl Sanchez MD on 2/11/2022      cc: Mikala Luna

## 2022-02-11 NOTE — PLAN OF CARE
Problem: Gas Exchange Impaired  Goal: Optimal Gas Exchange  Outcome: Improving   Patient asleep at start of shift and was on BiPap. Removed placed on Oxymask @ 6 LPM @ 0930. RT Titrated down to 4 LPM shortly after and patient was saturating @ 92% an hour after.   Patient to use her home CPAP tonight, RT aware. Machine and distilled water are on bedside table. BiPap discontinued and removed from the room.  Problem: Venous Thromboembolism  Goal: VTE Symptom Resolution  Outcome: Improving   Heparin gtt stopped and patient to begin Xarelto PO.    Remains on Lasix gtt @ 10mg/hr. UOP was 2800 this shift.

## 2022-02-11 NOTE — PROGRESS NOTES
Daily Progress Note    CODE STATUS:  Full Code    02/09/22  Assessment/Plan:  Bess Enamorado is a 46 year old female with a recent COVID infection on 12/2021 who received monoclonal antibody, MS, chronic lymphedema, hypertension, depression, NARCISA, PCOS, MS who admitted for fever, headache, increased leg swelling and cough found to have pneumonia, repeat positive Covid PCR, cellulitis of lower extremity, acute hypoxic respiratory failure, CHF exacerbation, CT chest with PE.     Acute hypoxic respiratory failure; likely multifactorial etiology-CHF exacerbation, pulmonary embolism, possible bacterial pneumonia, COVID-19 pneumonia, physical deconditioning.    History of NARCISA on home CPAP  --Patient required BiPAP on admission.  Currently on 6 L oxygen mask.  Titrate O2 as able.    --On 12/11, discontinued nighttime BiPAP, resume home CPAP at bedtime  --Incentive spirometry, flutter valve.  --Treat underlying cause as mentioned below  --Appreciate pulmonary input.    Acute heart failure with preserved EF;  --On admission BNP 2230 and clinically looked volume overload  --Echo reported EF 65%.  Moderately decreased right ventricular systolic function.    --On Lasix drip, defer to cardiologist  --Monitor intake/output, weight, labs closely.  --Appreciated cardiology input.     Elevated troponin; likely demand ischemia in the setting of acute diastolic heart failure, pulmonary embolism, COVID-19  --Echo result mentioned above.  Troponin trending down.    --Cardiologist recommended no further ischemic evaluation at this time, can consider stress testing later and defer timing to cardiology team.    Acute right upper lobe pulmonary embolism;  --CT chest also reported pulmonary artery hypertension and Right heart dysfunction.  --Echo as mentioned above.  --Bilateral lower extremity US negative for DVT.  --On heparin drip, continue.  Discussed with pulmonologist, Dr. Benedict and pharmacist, transition heparin drip to Xarelto.   Discussed with patient.  --Appreciated pulmonary input, recommended 3 months of anticoagulation and repeat TTE in 3 months to reeval RV function.    Positive COVID-19 test on 12/11/2021 and 2/8/22;  Sepsis likely due to left lower extremity cellulitis;  Positive blood culture; likely contamination  --On admission, CT chest reported new right lung infiltrate compatible with pneumonia.  --On admission, elevated procalcitonin and elevated WBC count.  Normal lactic acid.  --Appreciated ID input, reported fever likely due to LLE cellulitis, reported less likely bacterial pneumonia.  --On 2/10, IV Vanco discontinued.  On 2/11 IV Zosyn changed to Rocephin.  Appreciated ID input.  --3 days course of remdesivir per ID recommendation.  --Continue clinical monitoring, lab monitoring  --Wound care nurse on case    Chronic lower extremity lymphedema;  --Continue local care. lymphedema wrap    Essential hypertension; controlled  --Continue home metoprolol, lisinopril.  Hydralazine as needed     History of MS;  No longer taking medication.  Primarily has left lower extremity weakness and pain.    --PT OT.     Morbid obesity;  Body mass index is 66.17 kg/m .  --Weight reduction program, defer to primary as an outpatient.     Disposition; pending  Barrier to discharge; multiple medical issues, refer above     LOS: 1 day     Subjective:  Interval History: Patient seen and examined. Notes, labs, imaging reports personally reviewed.  Patient reported breathing stable to improving.  Denied chest pain.  Reported intermittent cough, mostly dry.  Denied feeling fever or chills.  Denied abdomen pain, nausea, vomiting.  Reported lower extremity swelling and pain improving.  Patient is still on 6 L oxygen mask.  Discussed with patient that we will transition hospital BiPAP to home CPAP tonight.  Also discussed about anticoagulation risks and benefit and she decided with DOAC.  Discussed with nursing staffs.  Discussed with ID  provider.  Patient declined me to call family members.    Review of Systems:   As mentioned in subjective.    Patient Active Problem List   Diagnosis     Multiple sclerosis (H)     Polycystic ovaries     Constipation     CARDIOVASCULAR SCREENING; LDL GOAL LESS THAN 160     Anxiety     Restless legs     Leg edema     Eating disorder     Papanicolaou smear of cervix with low grade squamous intraepithelial lesion (LGSIL)     Morbid obesity (H)     Peroneal tendon rupture     Benign essential hypertension     NARCISA (obstructive sleep apnea)     Moderate episode of recurrent major depressive disorder (H)     Cellulitis of abdominal wall     History of abnormal cervical Pap smear     Generalized anxiety disorder     Lymphedema of both lower extremities     Tachycardia     SIRS (systemic inflammatory response syndrome) (H)     Cellulitis of right leg     Cellulitis     Pneumonia due to infectious organism, unspecified laterality, unspecified part of lung     Acute pulmonary embolism without acute cor pulmonale, unspecified pulmonary embolism type (H)     COVID-19     Acute on chronic diastolic congestive heart failure (H)     Acute respiratory failure with hypoxia (H)     NSTEMI (non-ST elevated myocardial infarction) (H)       Scheduled Meds:    remdesivir  100 mg Intravenous Q24H    And     sodium chloride 0.9%  50 mL Intravenous Q24H     gabapentin  200 mg Oral TID     influenza quadrivalent (PF) vacc  0.5 mL Intramuscular Prior to discharge     lisinopril  10 mg Oral Daily     metoprolol succinate ER  25 mg Oral Daily     miconazole   Topical BID     piperacillin-tazobactam  3.375 g Intravenous Q8H     sertraline  100 mg Oral Daily     sodium chloride (PF)  3 mL Intravenous Q8H     Continuous Infusions:    furosemide (LASIX) infusion ADULT STANDARD 10 mg/hr (02/11/22 2913)     heparin 3,050 Units/hr (02/11/22 3168)     - MEDICATION INSTRUCTIONS -       PRN Meds:.acetaminophen **OR** acetaminophen, lidocaine 4%, lidocaine  (buffered or not buffered), loperamide, melatonin, ondansetron **OR** ondansetron, oxymetazoline, - MEDICATION INSTRUCTIONS -, senna-docusate **OR** senna-docusate, sodium chloride, sodium chloride (PF), sodium chloride (PF)    Objective:  Vital signs in last 24 hours:  Temp:  [98.1  F (36.7  C)-99  F (37.2  C)] 98.2  F (36.8  C)  Pulse:  [76-88] 78  Resp:  [17-23] 17  BP: (103-113)/(63-79) 111/77  FiO2 (%):  [40 %] 40 %  SpO2:  [91 %-98 %] 91 %      Intake/Output Summary (Last 24 hours) at 2/9/2022 1502  Last data filed at 2/9/2022 1400  Gross per 24 hour   Intake 1150 ml   Output 3100 ml   Net -1950 ml       Physical Exam:  General: Not in obvious distress.  HEENT: Normocephalic, supple neck  Chest: Decreased but clear to auscultation bilateral anteriorly, no wheezing but noticed occasional rhonchi  Heart: S1S2 normal, regular  Abdomen: Soft.  Morbidly obese. Bowel sounds- active.  Extremities: Bilateral lower extremity lymphedema with skin changes, also noticed redness on both lower extremities more on left than right  Neuro: alert and awake, moves all extremities but has generalized motor weakness      Lab Results:(I have personally reviewed the results)    Recent Results (from the past 24 hour(s))   Vancomycin level    Collection Time: 02/10/22 10:57 AM   Result Value Ref Range    Vancomycin 16.6   mg/L   Heparin Unfractionated Anti Xa Level    Collection Time: 02/10/22  1:56 PM   Result Value Ref Range    Anti Xa Unfractionated Heparin 0.33 For Reference Range, See Comment IU/mL   Potassium    Collection Time: 02/10/22  7:50 PM   Result Value Ref Range    Potassium 3.6 3.5 - 5.0 mmol/L   Magnesium    Collection Time: 02/10/22  7:50 PM   Result Value Ref Range    Magnesium 2.0 1.8 - 2.6 mg/dL   Creatinine    Collection Time: 02/10/22  7:50 PM   Result Value Ref Range    Creatinine 0.81 0.60 - 1.10 mg/dL    GFR Estimate 90 >60 mL/min/1.73m2   CBC with platelets    Collection Time: 02/11/22  5:02 AM   Result  Value Ref Range    WBC Count 5.9 4.0 - 11.0 10e3/uL    RBC Count 4.68 3.80 - 5.20 10e6/uL    Hemoglobin 12.6 11.7 - 15.7 g/dL    Hematocrit 42.3 35.0 - 47.0 %    MCV 90 78 - 100 fL    MCH 26.9 26.5 - 33.0 pg    MCHC 29.8 (L) 31.5 - 36.5 g/dL    RDW 15.6 (H) 10.0 - 15.0 %    Platelet Count 219 150 - 450 10e3/uL   Basic metabolic panel    Collection Time: 02/11/22  5:02 AM   Result Value Ref Range    Sodium 140 136 - 145 mmol/L    Potassium 3.4 (L) 3.5 - 5.0 mmol/L    Chloride 99 98 - 107 mmol/L    Carbon Dioxide (CO2) 33 (H) 22 - 31 mmol/L    Anion Gap 8 5 - 18 mmol/L    Urea Nitrogen 14 8 - 22 mg/dL    Creatinine 0.86 0.60 - 1.10 mg/dL    Calcium 8.5 8.5 - 10.5 mg/dL    Glucose 97 70 - 125 mg/dL    GFR Estimate 83 >60 mL/min/1.73m2   CRP inflammation    Collection Time: 02/11/22  5:02 AM   Result Value Ref Range    CRP 13.1 (H) 0.0-<0.8 mg/dL   Heparin Unfractionated Anti Xa Level    Collection Time: 02/11/22  5:02 AM   Result Value Ref Range    Anti Xa Unfractionated Heparin 0.31 For Reference Range, See Comment IU/mL   Magnesium    Collection Time: 02/11/22  5:02 AM   Result Value Ref Range    Magnesium 1.9 1.8 - 2.6 mg/dL   INR    Collection Time: 02/11/22  5:02 AM   Result Value Ref Range    INR 1.23 (H) 0.90 - 1.15         Serum Glucose range:   Recent Labs   Lab 02/11/22  0502 02/10/22  0534 02/09/22  0457 02/08/22  1115   GLC 97 107 116 116     ABG:   Recent Labs   Lab 02/08/22  2329   PH 7.46*   PCO2 34*   PO2 63*   HCO3 25     CBC:   Recent Labs   Lab 02/11/22  0502 02/10/22  0533 02/09/22  0457 02/08/22  1620 02/08/22  1115   WBC 5.9 7.0 10.0   < > 15.3*   HGB 12.6 11.8 12.7   < > 13.7   HCT 42.3 39.2 41.5   < > 43.1   MCV 90 90 88   < > 86    213 212   < > 269   NEUTROPHIL  --   --   --   --  86   LYMPH  --   --   --   --  8   MONOCYTE  --   --   --   --  5   EOSINOPHIL  --   --   --   --  0    < > = values in this interval not displayed.     Chemistry:   Recent Labs   Lab 02/11/22  6116  02/10/22  1950 02/10/22  0534 22  0457 22  1115     --  138 136 134*   POTASSIUM 3.4* 3.6 3.5 3.8 4.3   CHLORIDE 99  --  103 104 105   CO2 33*  --  25 21* 18*   BUN 14  --  15 13 16   CR 0.86 0.81 0.92 0.82 0.76   GFRESTIMATED 83 90 77 88 >90   EVITA 8.5  --  8.3* 9.0 9.5   MAG 1.9 2.0  --  2.3 1.6*   PROTTOTAL  --   --   --   --  7.3   ALBUMIN  --   --   --   --  3.1*   AST  --   --   --   --  18   ALT  --   --   --   --  12   ALKPHOS  --   --   --   --  40*   BILITOTAL  --   --   --   --  2.3*     Coags:  Recent Labs   Lab 22  0502   INR 1.23*     Cardiac Markers:  Recent Labs   Lab 22  0457   TROPONINI 0.33*        Echocardiogram Complete    Result Date: 2022  [id number]^TIANA^JORJE^[place] [address]  Name: CITLALY MICHEL MRN: [id number] : 1975 Study Date: 2022 09:28 AM Age: 46 yrs Gender: Female Patient Location: [facility] Reason For Study: CHF Ordering Physician: JORJE MONTIEL Performed By: BEN  BSA: 2.8 m2 Height: 64 in Weight: 480 lb HR: 97 ______________________________________________________________________________ Procedure Complete Portable Echo Adult. Definity (NDC #41337-312) given intravenously. No hemodynamically significant valvular abnormalities on 2D or color flow imaging. Technically difficult, suboptimal study. There is no comparison study available. ______________________________________________________________________________ Interpretation Summary  1.Left ventricular size, wall motion and function are normal. The ejection fraction is > 65%. 2.Moderately decreased right ventricular systolic function 3.No hemodynamically significant valvular abnormalities on 2D or color flow imaging. 4.Technically difficult, suboptimal study due to patient size. There is no comparison study available. ______________________________________________________________________________ I      WMSI = 1.00      % Normal = 100  X - Cannot   0 -                      (2) - Mildly 2 -          Segments  Size Interpret    Hyperkinetic 1 - Normal  Hypokinetic  Hypokinetic  1-2     small                                                    7 -          3-5    moderate 3 - Akinetic 4 -          5 -         6 - Akinetic Dyskinetic   6-14    large              Dyskinetic   Aneurysmal  w/scar       w/scar       15-16   diffuse  Left Ventricle Left ventricular size, wall motion and function are normal. The ejection fraction is > 65%. There is normal left ventricular wall thickness. Left ventricular diastolic function is normal. No regional wall motion abnormalities noted.  Right Ventricle The right ventricle is mildly dilated. Moderately decreased right ventricular systolic function. The right ventricle is not well visualized.  Atria The left atrium is not well visualized. The left atrium is mildly dilated. Right atrial size is normal. There is no color Doppler evidence of an atrial shunt.  Mitral Valve The mitral valve is not well visualized. There is no mitral regurgitation noted. There is no mitral valve stenosis.  Tricuspid Valve The tricuspid valve is not well visualized, but is grossly normal. The tricuspid valve is not well visualized. Right ventricle systolic pressure estimate normal. There is physiologic tricuspid regurgitation. There is no tricuspid stenosis.  Aortic Valve Aortic valve leaflets appear normal. There is no evidence of aortic stenosis or clinically significant aortic regurgitation. The aortic valve is not well visualized. No aortic regurgitation is present. No aortic stenosis is present.  Pulmonic Valve The pulmonic valve is not well seen, but is grossly normal. The pulmonic valve is not well visualized. This degree of valvular regurgitation is within normal limits. There is no pulmonic valvular stenosis.  Vessels The aorta root is normal. Normal size ascending aorta. IVC diameter <2.1 cm collapsing >50% with  sniff suggests a normal RA pressure of 3 mmHg.  Pericardium There is no pericardial effusion.  Rhythm Sinus rhythm was noted.  ______________________________________________________________________________ MMode/2D Measurements & Calculations Ao root diam: 3.1 cm asc Aorta Diam: 3.6 cm  LVOT diam: 2.4 cm LVOT area: 4.7 cm2  Doppler Measurements & Calculations MV E max jim: 96.7 cm/sec MV A max jim: 121.8 cm/sec MV E/A: 0.79 MV dec slope: 538.6 cm/sec2 MV dec time: 0.18 sec Ao V2 max: 138.1 cm/sec Ao max P.0 mmHg Ao V2 mean: 87.3 cm/sec Ao mean PG: 3.7 mmHg Ao V2 VTI: 25.2 cm SHANELL(I,D): 4.1 cm2 SHANELL(V,D): 4.4 cm2 LV V1 max P.8 mmHg LV V1 max: 130.4 cm/sec LV V1 VTI: 22.0 cm SV(LVOT): 103.6 ml SI(LVOT): 36.5 ml/m2 AV Jim Ratio (DI): 0.94 SHANELL Index (cm2/m2): 1.4 E/E' av.8 Lateral E/e': 18.5 Medial E/e': 15.1  ______________________________________________________________________________ Report approved by: Lian Khoury 2022 10:51 AM       US Lower Extremity Venous Duplex Bilateral    Result Date: 2022  EXAM: US LOWER EXTREMITY VENOUS DUPLEX BILATERAL LOCATION: [facility] DATE/TIME: 2022 5:04 PM INDICATION: PE on CT, concern for DVT. COMPARISON: None. TECHNIQUE: Venous duplex ultrasound of bilateral lower extremities with and without compression, augmentation and duplex. Color flow and spectral Doppler with waveform analysis performed. FINDINGS: Exam includes the common femoral, femoral, popliteal veins as well as segmentally visualized deep calf veins and greater saphenous vein. RIGHT: No deep vein thrombosis. No superficial thrombophlebitis. No popliteal cyst. LEFT: No deep vein thrombosis. No superficial thrombophlebitis. No popliteal cyst.     IMPRESSION: No deep venous thrombosis in the bilateral lower extremities. Visualization of the veins is technically limited by body habitus.    CT Chest Pulmonary Embolism w Contrast    Result Date: 2022  EXAM: CT  CHEST PULMONARY EMBOLISM W CONTRAST LOCATION: [facility] DATE/TIME: 2/8/2022 3:03 PM INDICATION: COVID. Dyspnea. Fever. Weakness. COMPARISON: 03/05/2021 CT TECHNIQUE: CT chest pulmonary angiogram during arterial phase injection of IV contrast. Multiplanar reformats and MIP reconstructions were performed. Dose reduction techniques were used. CONTRAST: isovue 370 100ml FINDINGS: ANGIOGRAM CHEST: Enlarged main pulmonary artery is 4 cm diameter consistent with pulmonary arterial hypertension. Small nonocclusive thrombus right upper lobe pulmonary artery (series 5, image 66). No additional thrombi seen. Thoracic aorta is negative for dissection. RV/LV ratio abnormal, greater than 1.0. LUNGS AND PLEURA: Diffuse lung mosaic attenuation, similar to previous which can be seen with pulmonary arterial hypertension. No right upper and middle lobe groundglass/consolidative infiltrates suggesting an acute pneumonia. No pleural effusion. MEDIASTINUM/AXILLAE: No adenopathy. CORONARY ARTERY CALCIFICATION: None. UPPER ABDOMEN: Absent gallbladder MUSCULOSKELETAL: Upper chest wall venous collaterals. Spinal degenerative change.     IMPRESSION: 1.  Small nonocclusive right upper lobe pulmonary embolism. 2.  Pulmonary arterial hypertension. 3.  Right heart dysfunction. 4.  New right lung infiltrates compatible with pneumonia. 5.  Report called to Dr. Guillen at 3:30 PM.          Latest radiology report personally reviewed.    Note created using dragon voice recognition software so sounds alike errors may have escaped editing.    02/11/2022   JEFRY SMITH MD  HOSPITALIST, [facility]  PAGER NO. [phone]

## 2022-02-11 NOTE — PROGRESS NOTES
Care Management Follow Up    Length of Stay (days): 3    Expected Discharge Date: 02/13/2022     Concerns to be Addressed:     Medical mgmt of covid-19+ status; reduced O2; remdesivir completion; WOC, ID, and Cardiology following  Patient plan of care discussed at interdisciplinary rounds: Yes    Anticipated Discharge Disposition:  Home w/ home care vs. TCU (pending covid-recovered status)     Anticipated Discharge Services:  Resumption of WOC RN from [facility] 2x/week  Anticipated Discharge DME:      Referrals Placed by CM/SW:    Private pay costs discussed: Not applicable    Additional Information:  Therapy recommendation is TCU. Though it is unlikely that a COVID+ TCU bed will be available. CM to watch for covid-recovered status for TCU *versus* mobility increase for pt to return home with resumption of home RN (likely add PT/OT).      Anticipate pt to return home with resumptions of home care RN, pending medical stability. Pt has home CPAP in hospital.      Assessment hx: Pt lives alone and had WOC RN w/[facility] ; she uses walker and CPap. Pt states that she has PCA hours but have not been able to get a PCA. Family to transport.       SHIRIN Winter

## 2022-02-11 NOTE — CONSULTS
[facility]:  PULMONARY CONSULT NOTE     Date / Time of Admission:  2/8/2022 10:54 AM    ID: Bess Enamorado is a 46 year old female with severe morbid obesity (BMI 67.94 kg/m2) with physical deconditioning, obstructive sleep apnea on CPAP (consistently since Summer 2021), essential hypertension, multiple sclerosis, polycystic ovarian syndrome, history of teratoma - monitoring, chronic lymphedema, and prior diagnosis of COVID-19 12/6/2021 s/p monoclonal antibody who was admitted to [facility] on 2/8/2022 with concern for recurrent COVID pneumonia and possible superimposed bacterial infection, small R upper lobe pulmonary embolism, respiratory failure intermittently requiring NIPPV, and lower extremity cellulitis.      Reason for Consult:  Respiratory failure         Assessment: 1.   Acute respiratory failure with hypoxia and hypercapnia.  Multifactorial including volume overload/pulmonary edema with CHF exacerbation, possible COVID-19 viral pneumonia and/or superimposed bacterial pneumonia.  Patient also with small RUL pulmonary embolism, although unlikely to contribute significantly to symptoms.  Has severe morbid obesity, with underlying NARCISA on CPAP.  2. COVID-19 viral infection and possible pneumonia.  Prior history of COVID-19 12/6/2021, received monoclonal antibody.  Now with repeat positivity, question if there is a new strain present.  3. Small right upper lung subsegmental pulmonary embolism.  Noted on CT chest 2/8.  Duplex ultrasound negative for DVT in lower extremities.  Given recurrent Covid infections, likely hypercoagulable in the state.  Also, has had severe immobility due to body habitus.  4. Obstructive sleep apnea on CPAP.  Diagnosed sometime ago, states she has been consistently using CPAP since summer 2021.  5. Acute CHF exacerbation with moderate RV dysfunction.  RV dysfunction - would not expect any RV dysfunction with the small PE that this patient has.  May have  alternative causes of abnormal RV including NARCISA, heart failure.         Plan:     Wean supplemental O2 as tolerates, goal O2 sat > 92%.     NIPPV at night for NARCISA management - use home CPAP once it is available.    COVID management per primary team: Remdesivir.  If O2 needs worsening, will add dexamethasone.    On Zosyn IV for pneumonia/cellulitis/bacteremia.    Lasix gtt/diuresis per primary team and Cardiology.  Potential for significant diuresis and severe electrolyte abnormalities with continuous furosemide infusion, so recommend that primary team and Cardiology team be cognizant of this for monitoring.  Patient has had ~ 4 L of urine output since this morning but no repeat K or Mg studies done.  I will order them now, will request RN to follow-up with primary team on findings.    Recommend 3 months of anticoagulation for PE.  Can switch from heparin gtt to oral therapy.  Discussed with Dr. Knight - he will review with pharmacy about medication costs.     Recommend repeat TTE in 3 months after treatment of PE, can re-assess RV at that time.    Patient and/or family were educated on the above recommendations.   Thank you for allowing our service to participate in the care of this patient.  Please call with any questions or concerns.    Total patient care time: 75 minutes, with over 50% spent in counseling/coordination of care.      Hilda Benedict MD, MPH  Pulmonary/Critical Care Medicine  02/10/2022  7:02 PM       Chief Complaint Chief Complaint   Patient presents with     Fever               HPI:   Bess Enamorado is a 46 year old female with severe morbid obesity (BMI 67.94 kg/m2) with physical deconditioning, obstructive sleep apnea on CPAP (consistently since Summer 2021), essential hypertension, multiple sclerosis, polycystic ovarian syndrome, history of teratoma - monitoring, chronic lymphedema, and prior diagnosis of COVID-19 12/6/2021 s/p monoclonal antibody who was admitted to [facility] on 2/8/2022  with concern for recurrent COVID pneumonia and possible superimposed bacterial infection, small R upper lobe pulmonary embolism, respiratory failure intermittently requiring NIPPV, and lower extremity cellulitis.  History is provided by the patient, Dr. Knight (hospitalist), review of medical records.    Patient reports she was in her normal state of health, but on Monday, 2/7/2022, she developed chills and fever.  She then had temperature to 103 on 2/8.  She also fell, and noticed significant pain in her legs when she rolled over.  This was when she identified having cellulitis.  Feels that her breathing was not significantly changed from baseline.  Covid PCR positive.  CT chest showing diffuse lung mosaic attenuation with right lung infiltrates, small nonocclusive right upper lobe pulmonary embolism and findings concerning for right heart dysfunction and pulmonary arterial hypertension.  Was initiated on heparin drip, Zosyn IV.  Blood cultures later returned as growing Staphylococcus and corynebacterium.  Was started on remdesivir per ID for Covid.  Has been on on/off NIPPV since admission.  Otherwise, TTE showed moderately decreased RV systolic dysfunction, no prior studies for comparison; LVEF greater than 65%.  Cardiology consulted, started on Lasix infusion for diuresis.    Patient reports that she feels relatively stable, but no fevers.  She thinks that all of her symptoms are truly secondary to cellulitis.  She notes prior episodes of hypoxia with cellulitis infections.        Review of Systems:   Review of Systems:  Pertinent items are noted in HPI.  The Review of Systems is negative other than noted in the HPI        Medical/Surgical History:     Past Medical History:   Diagnosis Date     Acute on chronic diastolic congestive heart failure (H) 2/9/2022     Arthritis 2019    Hips     ASCUS favor benign 8/2015    Neg HPV     Depressive disorder 2010     H/O colposcopy with cervical biopsy 9/14/15    Bx & ECC  - negative     Hypertension 2019     LSIL on Pap smear 7/2014    Neg high risk HPV     Multiple sclerosis (H) 8/29/2005 9/18/200pt dx with MS 2004--dx by neurolgist and is followed by Dr. Steven Ayala 10/2016     UNSPEC CONSTIPATION 9/18/2006 9/18/2006   Has tried otc suppository and otc lax.       Past Surgical History:   Procedure Laterality Date     CHOLECYSTECTOMY, LAPOROSCOPIC      Cholecystectomy, Laparoscopic     COLONOSCOPY  2007?    Bathroom issue..results normal     OPEN REDUCTION INTERNAL FIXATION TOE(S)  4/13/2012    Procedure:OPEN REDUCTION INTERNAL FIXATION TOE(S); Open reduction internal fixation right proximal fifth metatarsal fracture-   Anes-choice block; Surgeon:LEY, JEFFREY DUANE; Location:[place] OR            Allergies/Medications:   Allergies:     Allergies   Allergen Reactions     Nkda [No Known Drug Allergies]        OUTPATIENT Medications:  Medications Prior to Admission   Medication Sig Dispense Refill Last Dose     acetaminophen (TYLENOL) 325 MG tablet Take 650 mg by mouth every 6 hours as needed for mild pain    2/8/2022 at Unknown time     Cholecalciferol (VITAMIN D3 PO) Take 10,000 Units by mouth daily    2/7/2022 at Unknown time     desogestrel-ethinyl estradiol (APRI) 0.15-30 MG-MCG tablet TAKE 1 TABLET DAILY CONTINUOUSLY-OMIT PLACEBPO TABS UNTIL AFTER 3 MONTHS OF HORMONE TABS 112 tablet 3 2/7/2022 at Unknown time     gabapentin (NEURONTIN) 100 MG capsule TAKE 2 CAPSULES(200 MG) BY MOUTH THREE TIMES DAILY 540 capsule 1 2/8/2022 at am     lisinopril (ZESTRIL) 20 MG tablet Take 1 tablet (20 mg) by mouth daily 90 tablet 3 2/7/2022 at Unknown time     metoprolol succinate ER (TOPROL-XL) 25 MG 24 hr tablet TAKE 1 TABLET(25 MG) BY MOUTH DAILY 90 tablet 1 2/7/2022 at Unknown time     miconazole (MICATIN) 2 % external powder Apply topically 2 times daily   2/7/2022 at Unknown time     sertraline (ZOLOFT) 100 MG tablet Take 1 tablet (100 mg) by mouth daily 90 tablet 3 2/7/2022 at  Unknown time       INPATIENT Medications: Continuous Infusions:    furosemide (LASIX) infusion ADULT STANDARD 10 mg/hr (02/10/22 1810)     heparin 3,050 Units/hr (02/10/22 1844)     - MEDICATION INSTRUCTIONS -       INPATIENT Medications: Scheduled Medications:    remdesivir  100 mg Intravenous Q24H    And     sodium chloride 0.9%  50 mL Intravenous Q24H     gabapentin  200 mg Oral TID     [START ON 2/11/2022] influenza quadrivalent (PF) vacc  0.5 mL Intramuscular Prior to discharge     lisinopril  10 mg Oral Daily     metoprolol succinate ER  25 mg Oral Daily     miconazole   Topical BID     piperacillin-tazobactam  3.375 g Intravenous Q8H     sertraline  100 mg Oral Daily     sodium chloride (PF)  3 mL Intravenous Q8H           Family History:        Social History:      Reviewed:  Family History   Problem Relation Age of Onset     Neurologic Disorder Father         MS     Heart Disease Father         irregular heart rate     Diabetes Father      Melanoma Father      Sleep Apnea Father      Other Cancer Father      Cancer Maternal Grandfather         lung     Other Cancer Maternal Grandfather      Cancer Paternal Grandmother         stomach     Other Cancer Paternal Grandmother      Cancer Mother      Other Cancer Mother      Thyroid Disease Mother      Gynecology Maternal Aunt         PCOS     Hypertension Maternal Grandmother      Anxiety Disorder Maternal Grandmother      Thyroid Disease Maternal Grandmother      Anxiety Disorder Sister     Reviewed:    Tobacco: Denies    Alcohol: Denies    Drugs: Denies    Other: Lives alone  Social History     Socioeconomic History     Marital status: Single     Spouse name: Not on file     Number of children: Not on file     Years of education: Not on file     Highest education level: Not on file   Occupational History     Employer: [employer]   Tobacco Use     Smoking status: Never Smoker     Smokeless tobacco: Never Used   Vaping Use     Vaping Use: Never used  "  Substance and Sexual Activity     Alcohol use: Yes     Comment: social     Drug use: No     Sexual activity: Not Currently     Partners: Male     Birth control/protection: Pill   Other Topics Concern     Parent/sibling w/ CABG, MI or angioplasty before 65F 55M? No   Social History Narrative    , 3rd-5th grade     Social Determinants of Health     Financial Resource Strain: Low Risk      Difficulty of Paying Living Expenses: Not hard at all   Food Insecurity: No Food Insecurity     Worried About Running Out of Food in the Last Year: Never true     Ran Out of Food in the Last Year: Never true   Transportation Needs: No Transportation Needs     Lack of Transportation (Medical): No     Lack of Transportation (Non-Medical): No   Physical Activity: Not on file   Stress: Not on file   Social Connections: Not on file   Intimate Partner Violence: Not At Risk     Fear of Current or Ex-Partner: No     Emotionally Abused: No     Physically Abused: No     Sexually Abused: No   Housing Stability: Not on file              Objective:   Vitals:  /65 (BP Location: Left arm)   Pulse 85   Temp 98.2  F (36.8  C) (Axillary)   Resp 22   Ht 1.626 m (5' 4\")   Wt (!) 179.5 kg (395 lb 12.8 oz)   LMP 01/31/2022   SpO2 95%   BMI 67.94 kg/m    GEN: Pleasant female, morbidly obese, no acute distress, on Oxymask.  HEENT: Normocephalic, atraumatic.  Extraoccular eye movements intact, anicteric sclera. Moist mucous membranes.   NECK: Supple.    PULM: Non-labored breathing.  No use of accessory muscles.  Diminished breath sounds, no wheezing.   CVS: Regular rate and rhythm.  Normal S1, S2.  No rubs, murmurs, or gallops.    ABDOMEN: Hypooactive bowel sounds.  Obese.  Soft. Non-distended.    EXTREMITES:  No clubbing, cyanosis.  3+ edema, erythema of legs  NEURO:  Awake.  Oriented to person, place, time and situation.  Cranial nerves 2-12 grossly intact.      Intake/Output:  I/O last 3 completed shifts:  In: " 2552.6 [P.O.:860; I.V.:1642.6; IV Piggyback:50]  Out: 6200 [Urine:6000; Stool:200]        Pertinent Studies:   All laboratory data reviewed  Serum Glucose range:   Recent Labs   Lab 02/10/22  0534 02/09/22  0457 02/08/22  1115    116 116     ABG:   Recent Labs   Lab 02/08/22  2329   PH 7.46*   PCO2 34*   PO2 63*   HCO3 25     CBC:   Recent Labs   Lab 02/10/22  0533 02/09/22  0457 02/08/22  1620 02/08/22  1115   WBC 7.0 10.0 13.6* 15.3*   HGB 11.8 12.7 13.1 13.7   HCT 39.2 41.5 42.0 43.1   MCV 90 88 87 86    212 240 269   NEUTROPHIL  --   --   --  86   LYMPH  --   --   --  8   MONOCYTE  --   --   --  5   EOSINOPHIL  --   --   --  0     Chemistry:   Recent Labs   Lab 02/10/22  0534 02/09/22 0457 02/08/22  1115    136 134*   POTASSIUM 3.5 3.8 4.3   CHLORIDE 103 104 105   CO2 25 21* 18*   BUN 15 13 16   CR 0.92 0.82 0.76   GFRESTIMATED 77 88 >90   EVITA 8.3* 9.0 9.5   MAG  --  2.3 1.6*   PROTTOTAL  --   --  7.3   ALBUMIN  --   --  3.1*   AST  --   --  18   ALT  --   --  12   ALKPHOS  --   --  40*   BILITOTAL  --   --  2.3*     Coags:  No results for input(s): INR, PROTIME, PTT in the last 168 hours.    Invalid input(s): APTT  Cardiac Markers:  Recent Labs   Lab 02/09/22 0457   TROPONINI 0.33*      Microbiology:  Blood cultures x 2, 2/8:  2 of 4 bottles + Corynebacterium striatum, 1 of 2 bottles + Staphylococcus epidermidis        Cardiology/Radiology:   Cardiac: All cardiac studies reviewed by me.    EKG:  Reviewed    TTE, 2/9/22:  Summary:  1.Left ventricular size, wall motion and function are normal. The ejection fraction is > 65%. 2.Moderately decreased right ventricular systolic function. 3.No hemodynamically significant valvular abnormalities on 2D or color flow imaging. 4.Technically difficult, suboptimal study due to patient size. There is no comparison study available.    Radiology: All imaging studies reviewed by me.    CT chest PE study, 2/8/22:   FINDINGS: ANGIOGRAM CHEST: Enlarged main  pulmonary artery is 4 cm diameter consistent with pulmonary arterial hypertension. Small nonocclusive thrombus right upper lobe pulmonary artery (series 5, image 66). No additional thrombi seen.  Thoracic aorta is negative for dissection. RV/LV ratio abnormal, greater than 1.0. LUNGS AND PLEURA: Diffuse lung mosaic attenuation, similar to previous which can be seen with pulmonary arterial hypertension. No right upper and middle lobe groundglass/consolidative infiltrates suggesting an acute pneumonia. No pleural effusion.  MEDIASTINUM/AXILLAE: No adenopathy.  CORONARY ARTERY CALCIFICATION: None.  UPPER ABDOMEN: Absent gallbladder  MUSCULOSKELETAL: Upper chest wall venous collaterals. Spinal degenerative change.      IMPRESSION: 1.  Small nonocclusive right upper lobe pulmonary embolism. 2.  Pulmonary arterial hypertension. 3.  Right heart dysfunction. 4.  New right lung infiltrates compatible with pneumonia.

## 2022-02-12 ENCOUNTER — APPOINTMENT (OUTPATIENT)
Dept: PHYSICAL THERAPY | Facility: HOSPITAL | Age: 47
DRG: 871 | End: 2022-02-12
Payer: COMMERCIAL

## 2022-02-12 ENCOUNTER — APPOINTMENT (OUTPATIENT)
Dept: OCCUPATIONAL THERAPY | Facility: HOSPITAL | Age: 47
DRG: 871 | End: 2022-02-12
Payer: COMMERCIAL

## 2022-02-12 LAB
ANION GAP SERPL CALCULATED.3IONS-SCNC: 12 MMOL/L (ref 5–18)
BACTERIA BLD CULT: ABNORMAL
BUN SERPL-MCNC: 13 MG/DL (ref 8–22)
C REACTIVE PROTEIN LHE: 8.5 MG/DL (ref 0–0.8)
CALCIUM SERPL-MCNC: 8.9 MG/DL (ref 8.5–10.5)
CHLORIDE BLD-SCNC: 97 MMOL/L (ref 98–107)
CO2 SERPL-SCNC: 31 MMOL/L (ref 22–31)
CREAT SERPL-MCNC: 0.81 MG/DL (ref 0.6–1.1)
GFR SERPL CREATININE-BSD FRML MDRD: 90 ML/MIN/1.73M2
GLUCOSE BLD-MCNC: 98 MG/DL (ref 70–125)
HOLD SPECIMEN: NORMAL
HOLD SPECIMEN: NORMAL
MAGNESIUM SERPL-MCNC: 1.8 MG/DL (ref 1.8–2.6)
POTASSIUM BLD-SCNC: 3.5 MMOL/L (ref 3.5–5)
SODIUM SERPL-SCNC: 140 MMOL/L (ref 136–145)

## 2022-02-12 PROCEDURE — 90686 IIV4 VACC NO PRSV 0.5 ML IM: CPT | Performed by: INTERNAL MEDICINE

## 2022-02-12 PROCEDURE — 250N000013 HC RX MED GY IP 250 OP 250 PS 637: Performed by: FAMILY MEDICINE

## 2022-02-12 PROCEDURE — 250N000013 HC RX MED GY IP 250 OP 250 PS 637: Performed by: INTERNAL MEDICINE

## 2022-02-12 PROCEDURE — 250N000011 HC RX IP 250 OP 636: Performed by: INTERNAL MEDICINE

## 2022-02-12 PROCEDURE — 83735 ASSAY OF MAGNESIUM: CPT | Performed by: INTERNAL MEDICINE

## 2022-02-12 PROCEDURE — 999N000157 HC STATISTIC RCP TIME EA 10 MIN

## 2022-02-12 PROCEDURE — G0008 ADMIN INFLUENZA VIRUS VAC: HCPCS | Performed by: INTERNAL MEDICINE

## 2022-02-12 PROCEDURE — 80048 BASIC METABOLIC PNL TOTAL CA: CPT | Performed by: INTERNAL MEDICINE

## 2022-02-12 PROCEDURE — 86140 C-REACTIVE PROTEIN: CPT | Performed by: INTERNAL MEDICINE

## 2022-02-12 PROCEDURE — 99233 SBSQ HOSP IP/OBS HIGH 50: CPT | Performed by: INTERNAL MEDICINE

## 2022-02-12 PROCEDURE — 99207 PR NO CHARGE LOS: CPT | Performed by: INTERNAL MEDICINE

## 2022-02-12 PROCEDURE — 258N000003 HC RX IP 258 OP 636: Performed by: INTERNAL MEDICINE

## 2022-02-12 PROCEDURE — 210N000001 HC R&B IMCU HEART CARE

## 2022-02-12 PROCEDURE — 99232 SBSQ HOSP IP/OBS MODERATE 35: CPT | Performed by: INTERNAL MEDICINE

## 2022-02-12 PROCEDURE — 97535 SELF CARE MNGMENT TRAINING: CPT | Mod: GO

## 2022-02-12 PROCEDURE — 36415 COLL VENOUS BLD VENIPUNCTURE: CPT | Performed by: INTERNAL MEDICINE

## 2022-02-12 RX ORDER — FUROSEMIDE 10 MG/ML
40 INJECTION INTRAMUSCULAR; INTRAVENOUS EVERY 12 HOURS
Status: DISCONTINUED | OUTPATIENT
Start: 2022-02-12 | End: 2022-02-12

## 2022-02-12 RX ORDER — MAGNESIUM OXIDE 400 MG/1
400 TABLET ORAL 2 TIMES DAILY
Status: COMPLETED | OUTPATIENT
Start: 2022-02-12 | End: 2022-02-12

## 2022-02-12 RX ORDER — POTASSIUM CHLORIDE 1500 MG/1
20 TABLET, EXTENDED RELEASE ORAL ONCE
Status: COMPLETED | OUTPATIENT
Start: 2022-02-12 | End: 2022-02-12

## 2022-02-12 RX ORDER — MAGNESIUM SULFATE HEPTAHYDRATE 40 MG/ML
2 INJECTION, SOLUTION INTRAVENOUS ONCE
Status: COMPLETED | OUTPATIENT
Start: 2022-02-12 | End: 2022-02-12

## 2022-02-12 RX ORDER — FUROSEMIDE 10 MG/ML
40 INJECTION INTRAMUSCULAR; INTRAVENOUS EVERY 12 HOURS
Status: DISCONTINUED | OUTPATIENT
Start: 2022-02-12 | End: 2022-02-17

## 2022-02-12 RX ADMIN — MICONAZOLE NITRATE: 20 POWDER TOPICAL at 09:01

## 2022-02-12 RX ADMIN — POTASSIUM CHLORIDE 20 MEQ: 1500 TABLET, EXTENDED RELEASE ORAL at 09:00

## 2022-02-12 RX ADMIN — FUROSEMIDE 40 MG: 10 INJECTION, SOLUTION INTRAVENOUS at 20:26

## 2022-02-12 RX ADMIN — MAGNESIUM OXIDE TAB 400 MG (241.3 MG ELEMENTAL MG) 400 MG: 400 (241.3 MG) TAB at 20:26

## 2022-02-12 RX ADMIN — INFLUENZA A VIRUS A/VICTORIA/2570/2019 IVR-215 (H1N1) ANTIGEN (FORMALDEHYDE INACTIVATED), INFLUENZA A VIRUS A/TASMANIA/503/2020 IVR-221 (H3N2) ANTIGEN (FORMALDEHYDE INACTIVATED), INFLUENZA B VIRUS B/PHUKET/3073/2013 ANTIGEN (FORMALDEHYDE INACTIVATED), AND INFLUENZA B VIRUS B/WASHINGTON/02/2019 ANTIGEN (FORMALDEHYDE INACTIVATED) 0.5 ML: 15; 15; 15; 15 INJECTION, SUSPENSION INTRAMUSCULAR at 09:21

## 2022-02-12 RX ADMIN — GABAPENTIN 200 MG: 100 CAPSULE ORAL at 20:26

## 2022-02-12 RX ADMIN — RIVAROXABAN 15 MG: 15 TABLET, FILM COATED ORAL at 17:14

## 2022-02-12 RX ADMIN — MAGNESIUM SULFATE HEPTAHYDRATE 2 G: 40 INJECTION, SOLUTION INTRAVENOUS at 09:01

## 2022-02-12 RX ADMIN — GABAPENTIN 200 MG: 100 CAPSULE ORAL at 13:51

## 2022-02-12 RX ADMIN — METOPROLOL SUCCINATE 25 MG: 25 TABLET, EXTENDED RELEASE ORAL at 09:05

## 2022-02-12 RX ADMIN — RIVAROXABAN 15 MG: 15 TABLET, FILM COATED ORAL at 09:00

## 2022-02-12 RX ADMIN — GABAPENTIN 200 MG: 100 CAPSULE ORAL at 09:00

## 2022-02-12 RX ADMIN — MAGNESIUM OXIDE TAB 400 MG (241.3 MG ELEMENTAL MG) 400 MG: 400 (241.3 MG) TAB at 12:13

## 2022-02-12 RX ADMIN — CEFTRIAXONE SODIUM 2 G: 2 INJECTION, POWDER, FOR SOLUTION INTRAMUSCULAR; INTRAVENOUS at 12:40

## 2022-02-12 RX ADMIN — SERTRALINE HYDROCHLORIDE 100 MG: 50 TABLET ORAL at 09:00

## 2022-02-12 RX ADMIN — FUROSEMIDE 10 MG/HR: 10 INJECTION, SOLUTION INTRAVENOUS at 12:14

## 2022-02-12 RX ADMIN — FUROSEMIDE 10 MG/HR: 10 INJECTION, SOLUTION INTRAVENOUS at 02:18

## 2022-02-12 ASSESSMENT — ACTIVITIES OF DAILY LIVING (ADL)
ADLS_ACUITY_SCORE: 23

## 2022-02-12 ASSESSMENT — MIFFLIN-ST. JEOR: SCORE: 2291.96

## 2022-02-12 NOTE — PLAN OF CARE
Assumed care 2300 to 0730. A&O x 4. Assist x 2, bedfast. Tele is NSR. Denies pain. Brar catheter in place,  bag below bladder. Bilateral lymphedema wraps in place. 4L O2 via oxymask when awake, CPAP when sleeping. Call light within reach, able to make needs known. Bed alarm on for safety.      Problem: Infection Progression (Sepsis)  Goal: Absence of Infection Signs and Symptoms  Intervention: Initiate Sepsis Management  Recent Flowsheet Documentation  Taken 2/12/2022 0400 by RAGHAV PINO  Infection Prevention:   environmental surveillance performed   rest/sleep promoted    Problem: Tissue Perfusion (Acute Coronary Syndrome)  Goal: Adequate Tissue Perfusion  Intervention: Optimize Cardiac Tissue Perfusion  Recent Flowsheet Documentation  Taken 2/12/2022 0400 by RAGHAV PINO  Activity Management: activity adjusted per tolerance

## 2022-02-12 NOTE — PLAN OF CARE
Problem: Tissue Perfusion (Acute Coronary Syndrome)  Goal: Adequate Tissue Perfusion  Intervention: Optimize Cardiac Tissue Perfusion  Recent Flowsheet Documentation  Taken 2/11/2022 1720 by Rossy Breen RN  Activity Management:   activity adjusted per tolerance   activity encouraged      Patient is compliant with cares. She has skin break down under her breasts, and under pannus. Wicking material in place, although she needs a new one under her pannus while the current one dries out. Pt on 4 L oxymask satting at 96%+. Brar in place. Abrasion on right elbow. ID following for Left leg cellulitis. Cardiology following r/t CHF.    Bilateral lower extremities: +3 edema, hard, lumpy, flaky, red legs. Bilateral lymphedema wraps on & in place.     Furosemide running continuously at 10 mL/hr.      Telemetry reads Normal Sinus Rhythm with Prolonged QT.

## 2022-02-12 NOTE — PROGRESS NOTES
"  HEART CARE CONSULTATON NOTE        Assessment/Recommendations   Assessment:   1.  Acute heart failure preserved ejection fraction. WT down 15 lb since admission.    2.  Right ventricular systolic dysfunction  3. COVID PNA  4. Acute PE    Plan:   1. Disontinue lasix gtt, start 40 mg IV BID , transition to orals tomorrow or Monday.    2. Continue lisinopril  3. Continue metoprolol   4. Anticoagulation for acute PE per medicine team.         History of Present Illness/Subjective      S: Review of telemetry demonstrated short burst of atrial tachycardia for 4 seconds.  Patient had significant diuresis over the past 24 hours.  Reduced IV diuretic therapy.  Unable to directly communicate with the patient today after multiple times on the phone.     Physical Examination, limited d/t COVID isolation, reviewed DR. Flores PE Notes  Review of Systems   VITALS: /65 (BP Location: Left arm, Patient Position: Semi-[place]ton's)   Pulse 81   Temp 98.1  F (36.7  C) (Oral)   Resp 22   Ht 1.626 m (5' 4\")   Wt (!) 174.9 kg (385 lb 8 oz)   LMP 01/31/2022   SpO2 99%   BMI 66.17 kg/m    BMI: Body mass index is 66.17 kg/m .  Wt Readings from Last 3 Encounters:   02/11/22 (!) 174.9 kg (385 lb 8 oz)   05/25/21 (!) 181.4 kg (400 lb)   03/05/21 (!) 198 kg (436 lb 8.2 oz)       Intake/Output Summary (Last 24 hours) at 2/12/2022 1124  Last data filed at 2/12/2022 1030  Gross per 24 hour   Intake 1048 ml   Output 9100 ml   Net -8052 ml     General Appearance:   no distress, obese body habitus   ENT/Mouth:      EYES:     Neck:    Chest/Lungs:      Cardiovascular:       Abdomen:  no organomegaly, masses, bruits, or tenderness; bowel sounds are present   Extremities: Bilateral leg dressings   Skin:    Neurologic:      Psychiatric:              Lab Results    Chemistry/lipid CBC Cardiac Enzymes/BNP/TSH/INR   Recent Labs   Lab Test 02/11/16  0942   CHOL 162   HDL 64   LDL 75   TRIG 113     Recent Labs   Lab Test 02/11/16  0942 08/20/15  1206 "   LDL 75 102     Recent Labs   Lab Test 02/12/22  0454      POTASSIUM 3.5   CHLORIDE 97*   CO2 31   GLC 98   BUN 13   CR 0.81   GFRESTIMATED 90   EVITA 8.9     Recent Labs   Lab Test 02/12/22  0454 02/11/22  0502 02/10/22  1950   CR 0.81 0.86 0.81     Recent Labs   Lab Test 02/13/20  1641 06/26/17  1400 02/11/16  0942   A1C 5.7* 5.6 6.2*          Recent Labs   Lab Test 02/11/22  0502   WBC 5.9   HGB 12.6   HCT 42.3   MCV 90        Recent Labs   Lab Test 02/11/22  0502 02/10/22  0533 02/09/22  0457   HGB 12.6 11.8 12.7    Recent Labs   Lab Test 02/09/22  0457 02/08/22 2012   TROPONINI 0.33* 0.62*     Recent Labs   Lab Test 02/08/22  1620   BNP 2,230*     No results for input(s): TSH in the last 63072 hours.  Recent Labs   Lab Test 02/11/22  0502   INR 1.23*        Medical History  Surgical History Family History Social History   Past Medical History:   Diagnosis Date     Acute on chronic diastolic congestive heart failure (H) 2/9/2022     Arthritis 2019    Hips     ASCUS favor benign 8/2015    Neg HPV     Depressive disorder 2010     H/O colposcopy with cervical biopsy 9/14/15    Bx & ECC - negative     Hypertension 2019     LSIL on Pap smear 7/2014    Neg high risk HPV     Multiple sclerosis (H) 8/29/2005 9/18/200pt dx with MS 2004--dx by neurolgist and is followed by Dr. Steven Ayala 10/2016     UNSPEC CONSTIPATION 9/18/2006 9/18/2006   Has tried otc suppository and otc lax.      Past Surgical History:   Procedure Laterality Date     CHOLECYSTECTOMY, LAPOROSCOPIC      Cholecystectomy, Laparoscopic     COLONOSCOPY  2007?    Bathroom issue..results normal     OPEN REDUCTION INTERNAL FIXATION TOE(S)  4/13/2012    Procedure:OPEN REDUCTION INTERNAL FIXATION TOE(S); Open reduction internal fixation right proximal fifth metatarsal fracture-   Anes-choice block; Surgeon:LEY, JEFFREY DUANE; Location:[place] OR     Family History   Problem Relation Age of Onset     Neurologic Disorder Father         MS      Heart Disease Father         irregular heart rate     Diabetes Father      Melanoma Father      Sleep Apnea Father      Other Cancer Father      Cancer Maternal Grandfather         lung     Other Cancer Maternal Grandfather      Cancer Paternal Grandmother         stomach     Other Cancer Paternal Grandmother      Cancer Mother      Other Cancer Mother      Thyroid Disease Mother      Gynecology Maternal Aunt         PCOS     Hypertension Maternal Grandmother      Anxiety Disorder Maternal Grandmother      Thyroid Disease Maternal Grandmother      Anxiety Disorder Sister         Social History     Socioeconomic History     Marital status: Single     Spouse name: Not on file     Number of children: Not on file     Years of education: Not on file     Highest education level: Not on file   Occupational History     Employer: [employer]   Tobacco Use     Smoking status: Never Smoker     Smokeless tobacco: Never Used   Vaping Use     Vaping Use: Never used   Substance and Sexual Activity     Alcohol use: Yes     Comment: social     Drug use: No     Sexual activity: Not Currently     Partners: Male     Birth control/protection: Pill   Other Topics Concern     Parent/sibling w/ CABG, MI or angioplasty before 65F 55M? No   Social History Narrative    , 3rd-5th grade     Social Determinants of Health     Financial Resource Strain: Low Risk      Difficulty of Paying Living Expenses: Not hard at all   Food Insecurity: No Food Insecurity     Worried About Running Out of Food in the Last Year: Never true     Ran Out of Food in the Last Year: Never true   Transportation Needs: No Transportation Needs     Lack of Transportation (Medical): No     Lack of Transportation (Non-Medical): No   Physical Activity: Not on file   Stress: Not on file   Social Connections: Not on file   Intimate Partner Violence: Not At Risk     Fear of Current or Ex-Partner: No     Emotionally Abused: No     Physically  Abused: No     Sexually Abused: No   Housing Stability: Not on file         Medications  Allergies   No current outpatient medications on file.        Allergies   Allergen Reactions     Nkda [No Known Drug Allergies]          Ced Torres DO

## 2022-02-12 NOTE — PROGRESS NOTES
INFECTIOUS DISEASE FOLLOW UP NOTE  phone visit    ASSESSMENT:  1. Fever. Most likely due to left lower extremity cellulitis. Other possibilities that this is COVID, less likely bacterial pneumonia. Improved.   2.  COVID positive. Possibilities are current infection vs residual mRNA remnants from infection in December. She had 2 vaccinations mid 2021. Certainly can see recurrent infection, and in December she would have likely had Delta variant, whereas now it would be Omicron, and the protection from previous infection and vaccine are not as good against Omicron. She is hypoxic, but lung imaging has not shown typical sign of COVID pneumonia. With acute onset fever 2/7 pm, it would be early for COVID pneumonia. CRP elevation -- not clear if this is from COVID or other process -- cellulitis. CRP improved.  Oxygenation improved with diuresis.   3. Pulmonary embolism. Right heart strain. Positive troponin.  4. CHF. Getting diuresis. BNP In the 2000s  5. MS>>has not been on rx  6. Left lower extremity cellulitis -- most suspicious for beta-hemolytic strep.   7. Positive blood cultures -- gram positive rods and coag neg staph are contaminants, not true bacteremia.   8. CT chest with consolidation, hi PCT and wbc on admission suggest bacterial       PLAN:  3 days remdesivir completing yesterday  Wean O2 as tolerated. : down from 6 to 4  Monitor O2, CRP   WBC down  ceftriaxone  Sputum cx    Alyssa Call M.D.      ______________________________________________________________________    SUBJECTIVE / INTERVAL HISTORY: feels better. Temp ok. Legs feel better. Has cough that she attributes to post-nasal drainage/nose-bleeds. I turned O2 by face mask down to 4L,     Productive cough  No chest pain  Preserved senses of smell and taste    MS not on rx    Her niece had home covid test over weekend, this was negative.     ROS: deconditioned. All other systems negative except as listed above.        OBJECTIVE:  /65    "Pulse 81   Temp 98.1  F (36.7  C) (Oral)   Resp 22   Ht 1.626 m (5' 4\")   Wt (!) 174.9 kg (385 lb 8 oz)   LMP 2022   SpO2 99%   BMI 66.17 kg/m    FiO2 (%): 45 %    Vital Signs  Temp: 98.2  F (36.8  C)  Temp src: Axillary  Resp: 22  Pulse: 84  Pulse Rate Source: Monitor  BP: 109/65  BP Location: Left arm    Hearing ok  No dysarthria  Alert and oriented  Speaks in short sentences, breathing ok at rest  No rash        Antibiotics:  pip/tazo -  Vancomycin -10    Pertinent labs:    Recent Labs   Lab 22  0502 02/10/22  0533 22  0457   WBC 5.9 7.0 10.0   HGB 12.6 11.8 12.7   HCT 42.3 39.2 41.5    213 212        Recent Labs   Lab 22  0454 22  0502 02/10/22  0534    140 138   CO2 31 33* 25   BUN 13 14 15        Lab Results   Component Value Date    CRP 8.5 (H) 2022    CRP 13.1 (H) 2022    CRP 20.0 (H) 02/10/2022         Lab Results   Component Value Date    ALT 12 2022    AST 18 2022    ALKPHOS 40 (L) 2022         MICROBIOLOGY DATA:  Blood cultures  -- both sets with corynebacterium striatum, one set coag neg staph      RADIOLOGY:  Echocardiogram Complete    Result Date: 2022  [id number]^TIANA^JORJE^[place] [address]  Name: CITLALY MICHEL MRN: [id number] : 1975 Study Date: 2022 09:28 AM Age: 46 yrs Gender: Female Patient Location: [facility] Reason For Study: CHF Ordering Physician: JORJE MONTIEL Performed By:   BSA: 2.8 m2 Height: 64 in Weight: 480 lb HR: 97 ______________________________________________________________________________ Procedure Complete Portable Echo Adult. Definity ([facility]C #05441-906) given intravenously. No hemodynamically significant valvular abnormalities on 2D or color flow imaging. Technically difficult, suboptimal study. There is no comparison study available. " ______________________________________________________________________________ Interpretation Summary  1.Left ventricular size, wall motion and function are normal. The ejection fraction is > 65%. 2.Moderately decreased right ventricular systolic function 3.No hemodynamically significant valvular abnormalities on 2D or color flow imaging. 4.Technically difficult, suboptimal study due to patient size. There is no comparison study available. ______________________________________________________________________________ I      WMSI = 1.00     % Normal = 100  X - Cannot   0 -                      (2) - Mildly 2 -          Segments  Size Interpret    Hyperkinetic 1 - Normal  Hypokinetic  Hypokinetic  1-2     small                                                    7 -          3-5    moderate 3 - Akinetic 4 -          5 -         6 - Akinetic Dyskinetic   6-14    large              Dyskinetic   Aneurysmal  w/scar       w/scar       15-16   diffuse  Left Ventricle Left ventricular size, wall motion and function are normal. The ejection fraction is > 65%. There is normal left ventricular wall thickness. Left ventricular diastolic function is normal. No regional wall motion abnormalities noted.  Right Ventricle The right ventricle is mildly dilated. Moderately decreased right ventricular systolic function. The right ventricle is not well visualized.  Atria The left atrium is not well visualized. The left atrium is mildly dilated. Right atrial size is normal. There is no color Doppler evidence of an atrial shunt.  Mitral Valve The mitral valve is not well visualized. There is no mitral regurgitation noted. There is no mitral valve stenosis.  Tricuspid Valve The tricuspid valve is not well visualized, but is grossly normal. The tricuspid valve is not well visualized. Right ventricle systolic pressure estimate normal. There is physiologic tricuspid regurgitation. There is no tricuspid stenosis.  Aortic Valve Aortic valve  leaflets appear normal. There is no evidence of aortic stenosis or clinically significant aortic regurgitation. The aortic valve is not well visualized. No aortic regurgitation is present. No aortic stenosis is present.  Pulmonic Valve The pulmonic valve is not well seen, but is grossly normal. The pulmonic valve is not well visualized. This degree of valvular regurgitation is within normal limits. There is no pulmonic valvular stenosis.  Vessels The aorta root is normal. Normal size ascending aorta. IVC diameter <2.1 cm collapsing >50% with sniff suggests a normal RA pressure of 3 mmHg.  Pericardium There is no pericardial effusion.  Rhythm Sinus rhythm was noted.  ______________________________________________________________________________ MMode/2D Measurements & Calculations Ao root diam: 3.1 cm asc Aorta Diam: 3.6 cm  LVOT diam: 2.4 cm LVOT area: 4.7 cm2  Doppler Measurements & Calculations MV E max jim: 96.7 cm/sec MV A max jim: 121.8 cm/sec MV E/A: 0.79 MV dec slope: 538.6 cm/sec2 MV dec time: 0.18 sec Ao V2 max: 138.1 cm/sec Ao max P.0 mmHg Ao V2 mean: 87.3 cm/sec Ao mean PG: 3.7 mmHg Ao V2 VTI: 25.2 cm SHANELL(I,D): 4.1 cm2 SHANELL(V,D): 4.4 cm2 LV V1 max P.8 mmHg LV V1 max: 130.4 cm/sec LV V1 VTI: 22.0 cm SV(LVOT): 103.6 ml SI(LVOT): 36.5 ml/m2 AV Jim Ratio (DI): 0.94 SHANELL Index (cm2/m2): 1.4 E/E' av.8 Lateral E/e': 18.5 Medial E/e': 15.1  ______________________________________________________________________________ Report approved by: Lian Khoury 2022 10:51 AM       US Lower Extremity Venous Duplex Bilateral    Result Date: 2022  EXAM: US LOWER EXTREMITY VENOUS DUPLEX BILATERAL LOCATION: [facility] DATE/TIME: 2022 5:04 PM INDICATION: PE on CT, concern for DVT. COMPARISON: None. TECHNIQUE: Venous duplex ultrasound of bilateral lower extremities with and without compression, augmentation and duplex. Color flow and spectral Doppler with waveform analysis  performed. FINDINGS: Exam includes the common femoral, femoral, popliteal veins as well as segmentally visualized deep calf veins and greater saphenous vein. RIGHT: No deep vein thrombosis. No superficial thrombophlebitis. No popliteal cyst. LEFT: No deep vein thrombosis. No superficial thrombophlebitis. No popliteal cyst.     IMPRESSION: No deep venous thrombosis in the bilateral lower extremities. Visualization of the veins is technically limited by body habitus.    CT Chest Pulmonary Embolism w Contrast    Result Date: 2/8/2022  EXAM: CT CHEST PULMONARY EMBOLISM W CONTRAST LOCATION: [facility] DATE/TIME: 2/8/2022 3:03 PM INDICATION: COVID. Dyspnea. Fever. Weakness. COMPARISON: 03/05/2021 CT TECHNIQUE: CT chest pulmonary angiogram during arterial phase injection of IV contrast. Multiplanar reformats and MIP reconstructions were performed. Dose reduction techniques were used. CONTRAST: isovue 370 100ml FINDINGS: ANGIOGRAM CHEST: Enlarged main pulmonary artery is 4 cm diameter consistent with pulmonary arterial hypertension. Small nonocclusive thrombus right upper lobe pulmonary artery (series 5, image 66). No additional thrombi seen. Thoracic aorta is negative for dissection. RV/LV ratio abnormal, greater than 1.0. LUNGS AND PLEURA: Diffuse lung mosaic attenuation, similar to previous which can be seen with pulmonary arterial hypertension. No right upper and middle lobe groundglass/consolidative infiltrates suggesting an acute pneumonia. No pleural effusion. MEDIASTINUM/AXILLAE: No adenopathy. CORONARY ARTERY CALCIFICATION: None. UPPER ABDOMEN: Absent gallbladder MUSCULOSKELETAL: Upper chest wall venous collaterals. Spinal degenerative change.     IMPRESSION: 1.  Small nonocclusive right upper lobe pulmonary embolism. 2.  Pulmonary arterial hypertension. 3.  Right heart dysfunction. 4.  New right lung infiltrates compatible with pneumonia. 5.  Report called to Dr. Guillen at 3:30 PM.    CT  Head w/o Contrast    Result Date: 2/9/2022  EXAM: CT HEAD W/O CONTRAST LOCATION: [facility] DATE/TIME: 2/9/2022 10:32 PM INDICATION: Fall with headache. COMPARISON: None. TECHNIQUE: Routine CT Head without IV contrast. Multiplanar reformats. Dose reduction techniques were used. FINDINGS: INTRACRANIAL CONTENTS: No intracranial hemorrhage, extraaxial collection, or mass effect.  No CT evidence of acute infarct. There are a few tiny foci of low-attenuations primarily posterior to the lateral ventricles which most likely represent prominent perivascular spaces. Normal ventricles and sulci. VISUALIZED ORBITS/SINUSES/MASTOIDS: No intraorbital abnormality. No paranasal sinus mucosal disease. No middle ear or mastoid effusion. BONES/SOFT TISSUES: No acute abnormality.     IMPRESSION: 1.  No CT finding of a mass, hemorrhage or focal area suggestive of acute infarct

## 2022-02-12 NOTE — PLAN OF CARE
Problem: Nutrition Impaired (Sepsis/Septic Shock)  Goal: Optimal Nutrition Intake  Outcome: Improving     Problem: Cardiac-Related Pain (Acute Coronary Syndrome)  Goal: Absence of Cardiac-Related Pain  Outcome: Improving     Problem: Gas Exchange Impaired  Goal: Optimal Gas Exchange  Intervention: Optimize Oxygenation and Ventilation  Recent Flowsheet Documentation  Taken 2/12/2022 1145 by Rossy Breen RN  Head of Bed (HOB) Positioning: HOB at 30 degrees  Taken 2/12/2022 0900 by Rossy Breen RN  Head of Bed (HOB) Positioning: HOB at 30 degrees     Day/Afternoon Shift:  Patient compliant with cares. Diuresing well, Dr. Torres discharge furosemide drip, now on schedule IV furosemide 40 mg q 12hr. Anterior diminished. expiratory wheezes heard throughout all lobes. Brar in place and patent. Fluid restriction 1500.  Bilateral lower extremities: +3 edema, reddened, scaly. Pt on po xarelto 15 mg bid and po xarelto 20 mg at night.    Wound areas:   Under breasts, pannus and behind knee: skin tears, moisture. Cleaned, dried, miconazole powder applied then Interdry in place in all places except behind knees.    Protocols  K+: 3.5, replacement given, am recheck, 2/13.  Magnesium: 1.8, Iv 2g replacement given, am recheck, 2/13. (Dr. HA ordered scheduled PO mag 400 mg bid.)    Telemetry reads Normal Sinus Rhythm with Prolonged QT.  ---------------------------------------------------------------------------------    Evening/PM Shift:    Patient currently on 2 L oxymask satting at 97%+. Patient still receiving ic rocephin q 12 hrs.    Telemetry reads Normal Sinus Rhythm with Prolonged QT.

## 2022-02-12 NOTE — PROGRESS NOTES
Daily Progress Note    CODE STATUS:  Full Code    02/09/22  Assessment/Plan:  Bess Enamorado is a 46 year old female with a recent COVID infection on 12/2021 who received monoclonal antibody, MS, chronic lymphedema, hypertension, depression, NARCISA, PCOS, MS who admitted for fever, headache, increased leg swelling and cough found to have pneumonia, repeat positive Covid PCR, cellulitis of lower extremity, acute hypoxic respiratory failure, CHF exacerbation, CT chest with PE.     Acute hypoxic respiratory failure; likely multifactorial etiology-CHF exacerbation, pulmonary embolism, possible bacterial pneumonia, COVID-19 pneumonia, physical deconditioning.    History of NARCISA on home CPAP  --Patient required BiPAP on admission.   --On 2/11, discontinued nighttime BiPAP, resumed home CPAP, tolerating  --On 2/12, oxygen titrated to 2 L.  --Incentive spirometry, flutter valve.  --Treat underlying cause as mentioned below  --Appreciate pulmonary input.    Acute heart failure with preserved EF;  --On admission BNP 2230 and clinically looked volume overload  --Echo reported EF 65%.  Moderately decreased right ventricular systolic function.    --On Lasix drip, defer to cardiologist   --Monitor intake/output, weight, labs closely.  --Appreciated cardiology input.     Elevated troponin; likely demand ischemia in the setting of acute diastolic heart failure, pulmonary embolism, COVID-19  --Echo result mentioned above.  Troponin trending down.    --Cardiologist recommended no further ischemic evaluation at this time, can consider stress testing later and defer timing to cardiology team.    Acute right upper lobe pulmonary embolism;  --CT chest also reported pulmonary artery hypertension and Right heart dysfunction.  --Echo as mentioned above.  --Bilateral lower extremity US negative for DVT.  --Heparin drip transition to Xarelto, continue.  --Appreciated pulmonary input, recommended 3 months of anticoagulation and repeat TTE in 3  months to reeval RV function.    Positive COVID-19 test on 12/11/2021 and 2/8/22;  Sepsis likely due to left lower extremity cellulitis;  Positive blood culture; likely contamination  --On admission, CT chest reported new right lung infiltrate compatible with pneumonia.  --On admission, elevated procalcitonin and elevated WBC count.  Normal lactic acid.  --Completed 3 days course of remdesivir per ID recommendation.  --Appreciated ID input, reported fever likely due to LLE cellulitis, reported less likely bacterial pneumonia.  --On 2/10, IV Vanco discontinued.  On 2/11 IV Zosyn changed to IV Rocephin.  Appreciated ID input.  --Continue clinical monitoring, lab monitoring  --Wound care nurse on case    Chronic lower extremity lymphedema;  --Initiated lymphedema wrap, continue    Essential hypertension; controlled  --Continue home metoprolol, lisinopril.  Hydralazine as needed     History of MS;  No longer taking medication.  Primarily has left lower extremity weakness and pain.    --PT OT.     Morbid obesity;  Body mass index is 66.17 kg/m .  --Weight reduction program, defer to primary as an outpatient.     Disposition; pending  Barrier to discharge; multiple medical issues, refer above     LOS: 1 day     Subjective:  Interval History: Patient seen and examined. Notes, labs, imaging reports personally reviewed. Patient reported breathing continued to improve. Denied chest pain, cough or congestion. Denied abdomen pain, nausea, vomiting. Denied fever or chills.  Lower extremity pain continued to improve.  Discussed with nursing staff. Discussed with pulmonologist.  Patient declined me to call any family members.    Review of Systems:   As mentioned in subjective.    Patient Active Problem List   Diagnosis     Multiple sclerosis (H)     Polycystic ovaries     Constipation     CARDIOVASCULAR SCREENING; LDL GOAL LESS THAN 160     Anxiety     Restless legs     Leg edema     Eating disorder     Papanicolaou smear of  cervix with low grade squamous intraepithelial lesion (LGSIL)     Morbid obesity (H)     Peroneal tendon rupture     Benign essential hypertension     NARCISA (obstructive sleep apnea)     Moderate episode of recurrent major depressive disorder (H)     Cellulitis of abdominal wall     History of abnormal cervical Pap smear     Generalized anxiety disorder     Lymphedema of both lower extremities     Tachycardia     SIRS (systemic inflammatory response syndrome) (H)     Cellulitis of right leg     Cellulitis     Pneumonia due to infectious organism, unspecified laterality, unspecified part of lung     Acute pulmonary embolism without acute cor pulmonale, unspecified pulmonary embolism type (H)     COVID-19     Acute on chronic diastolic congestive heart failure (H)     Acute respiratory failure with hypoxia (H)     NSTEMI (non-ST elevated myocardial infarction) (H)       Scheduled Meds:    cefTRIAXone  2 g Intravenous Q24H     gabapentin  200 mg Oral TID     lisinopril  10 mg Oral Daily     magnesium sulfate  2 g Intravenous Once     metoprolol succinate ER  25 mg Oral Daily     miconazole   Topical BID     rivaroxaban ANTICOAGULANT  15 mg Oral BID w/meals    Followed by     [START ON 3/4/2022] rivaroxaban ANTICOAGULANT  15 mg Oral Daily with supper     sertraline  100 mg Oral Daily     sodium chloride (PF)  3 mL Intravenous Q8H     Continuous Infusions:    furosemide (LASIX) infusion ADULT STANDARD 10 mg/hr (02/12/22 0218)     - MEDICATION INSTRUCTIONS -       PRN Meds:.acetaminophen **OR** acetaminophen, lidocaine 4%, lidocaine (buffered or not buffered), loperamide, melatonin, ondansetron **OR** ondansetron, oxymetazoline, - MEDICATION INSTRUCTIONS -, senna-docusate **OR** senna-docusate, sodium chloride, sodium chloride (PF), sodium chloride (PF)    Objective:  Vital signs in last 24 hours:  Temp:  [97.6  F (36.4  C)-98.2  F (36.8  C)] 98.1  F (36.7  C)  Pulse:  [81-92] 81  Resp:  [18-22] 22  BP: (104-112)/(65-72)  106/65  SpO2:  [92 %-99 %] 99 %      Intake/Output Summary (Last 24 hours) at 2/9/2022 1502  Last data filed at 2/9/2022 1400  Gross per 24 hour   Intake 1150 ml   Output 3100 ml   Net -1950 ml       Physical Exam:  General: Not in obvious distress.  HEENT: Normocephalic, supple neck  Chest: Decreased but clear to auscultation bilateral anteriorly, no wheezing but noticed occasional rhonchi  Heart: S1S2 normal, regular  Abdomen: Soft.  Morbidly obese. Bowel sounds- active.  Extremities: Bilateral lower extremity lymphedema wrap  Neuro: alert and awake, moves all extremities but has generalized motor weakness      Lab Results:(I have personally reviewed the results)    Recent Results (from the past 24 hour(s))   Potassium    Collection Time: 02/11/22  1:15 PM   Result Value Ref Range    Potassium 3.6 3.5 - 5.0 mmol/L   Basic metabolic panel    Collection Time: 02/12/22  4:54 AM   Result Value Ref Range    Sodium 140 136 - 145 mmol/L    Potassium 3.5 3.5 - 5.0 mmol/L    Chloride 97 (L) 98 - 107 mmol/L    Carbon Dioxide (CO2) 31 22 - 31 mmol/L    Anion Gap 12 5 - 18 mmol/L    Urea Nitrogen 13 8 - 22 mg/dL    Creatinine 0.81 0.60 - 1.10 mg/dL    Calcium 8.9 8.5 - 10.5 mg/dL    Glucose 98 70 - 125 mg/dL    GFR Estimate 90 >60 mL/min/1.73m2   CRP inflammation    Collection Time: 02/12/22  4:54 AM   Result Value Ref Range    CRP 8.5 (H) 0.0-<0.8 mg/dL   Magnesium    Collection Time: 02/12/22  4:54 AM   Result Value Ref Range    Magnesium 1.8 1.8 - 2.6 mg/dL   Extra Purple Top Tube    Collection Time: 02/12/22  4:54 AM   Result Value Ref Range    Hold Specimen JIC    Extra Red Top Tube    Collection Time: 02/12/22  4:54 AM   Result Value Ref Range    Hold Specimen JIC          Serum Glucose range:   Recent Labs   Lab 02/12/22  0454 02/11/22  0502 02/10/22  0534 02/09/22  0457   GLC 98 97 107 116     ABG:   Recent Labs   Lab 02/08/22 2329   PH 7.46*   PCO2 34*   PO2 63*   HCO3 25     CBC:   Recent Labs   Lab 02/11/22  3049  02/10/22  0533 22  0457 22  1620 22  1115   WBC 5.9 7.0 10.0   < > 15.3*   HGB 12.6 11.8 12.7   < > 13.7   HCT 42.3 39.2 41.5   < > 43.1   MCV 90 90 88   < > 86    213 212   < > 269   NEUTROPHIL  --   --   --   --  86   LYMPH  --   --   --   --  8   MONOCYTE  --   --   --   --  5   EOSINOPHIL  --   --   --   --  0    < > = values in this interval not displayed.     Chemistry:   Recent Labs   Lab 22  0454 22  1315 22  0502 02/10/22  1950 02/10/22  0534 22  0457 22  1115     --  140  --  138   < > 134*   POTASSIUM 3.5 3.6 3.4* 3.6 3.5   < > 4.3   CHLORIDE 97*  --  99  --  103   < > 105   CO2 31  --  33*  --  25   < > 18*   BUN 13  --  14  --  15   < > 16   CR 0.81  --  0.86 0.81 0.92   < > 0.76   GFRESTIMATED 90  --  83 90 77   < > >90   EVITA 8.9  --  8.5  --  8.3*   < > 9.5   MAG 1.8  --  1.9 2.0  --    < > 1.6*   PROTTOTAL  --   --   --   --   --   --  7.3   ALBUMIN  --   --   --   --   --   --  3.1*   AST  --   --   --   --   --   --  18   ALT  --   --   --   --   --   --  12   ALKPHOS  --   --   --   --   --   --  40*   BILITOTAL  --   --   --   --   --   --  2.3*    < > = values in this interval not displayed.     Coags:  Recent Labs   Lab 22   INR 1.23*     Cardiac Markers:  Recent Labs   Lab 22   TROPONINI 0.33*        Echocardiogram Complete    Result Date: 2022  [id number]^TIANA^JORJE^[place] [address]  Name: CITLALY MICHEL MRN: [id number] : 1975 Study Date: 2022 09:28 AM Age: 46 yrs Gender: Female Patient Location: [facility] Reason For Study: CHF Ordering Physician: JORJE MONTIEL Performed By:   BSA: 2.8 m2 Height: 64 in Weight: 480 lb HR: 97 ______________________________________________________________________________ Procedure Complete Portable Echo Adult. Definity (NDC #61787-264) given intravenously. No hemodynamically significant  valvular abnormalities on 2D or color flow imaging. Technically difficult, suboptimal study. There is no comparison study available. ______________________________________________________________________________ Interpretation Summary  1.Left ventricular size, wall motion and function are normal. The ejection fraction is > 65%. 2.Moderately decreased right ventricular systolic function 3.No hemodynamically significant valvular abnormalities on 2D or color flow imaging. 4.Technically difficult, suboptimal study due to patient size. There is no comparison study available. ______________________________________________________________________________ I      WMSI = 1.00     % Normal = 100  X - Cannot   0 -                      (2) - Mildly 2 -          Segments  Size Interpret    Hyperkinetic 1 - Normal  Hypokinetic  Hypokinetic  1-2     small                                                    7 -          3-5    moderate 3 - Akinetic 4 -          5 -         6 - Akinetic Dyskinetic   6-14    large              Dyskinetic   Aneurysmal  w/scar       w/scar       15-16   diffuse  Left Ventricle Left ventricular size, wall motion and function are normal. The ejection fraction is > 65%. There is normal left ventricular wall thickness. Left ventricular diastolic function is normal. No regional wall motion abnormalities noted.  Right Ventricle The right ventricle is mildly dilated. Moderately decreased right ventricular systolic function. The right ventricle is not well visualized.  Atria The left atrium is not well visualized. The left atrium is mildly dilated. Right atrial size is normal. There is no color Doppler evidence of an atrial shunt.  Mitral Valve The mitral valve is not well visualized. There is no mitral regurgitation noted. There is no mitral valve stenosis.  Tricuspid Valve The tricuspid valve is not well visualized, but is grossly normal. The tricuspid valve is not well visualized. Right ventricle systolic  pressure estimate normal. There is physiologic tricuspid regurgitation. There is no tricuspid stenosis.  Aortic Valve Aortic valve leaflets appear normal. There is no evidence of aortic stenosis or clinically significant aortic regurgitation. The aortic valve is not well visualized. No aortic regurgitation is present. No aortic stenosis is present.  Pulmonic Valve The pulmonic valve is not well seen, but is grossly normal. The pulmonic valve is not well visualized. This degree of valvular regurgitation is within normal limits. There is no pulmonic valvular stenosis.  Vessels The aorta root is normal. Normal size ascending aorta. IVC diameter <2.1 cm collapsing >50% with sniff suggests a normal RA pressure of 3 mmHg.  Pericardium There is no pericardial effusion.  Rhythm Sinus rhythm was noted.  ______________________________________________________________________________ MMode/2D Measurements & Calculations Ao root diam: 3.1 cm asc Aorta Diam: 3.6 cm  LVOT diam: 2.4 cm LVOT area: 4.7 cm2  Doppler Measurements & Calculations MV E max jim: 96.7 cm/sec MV A max jim: 121.8 cm/sec MV E/A: 0.79 MV dec slope: 538.6 cm/sec2 MV dec time: 0.18 sec Ao V2 max: 138.1 cm/sec Ao max P.0 mmHg Ao V2 mean: 87.3 cm/sec Ao mean PG: 3.7 mmHg Ao V2 VTI: 25.2 cm SHANELL(I,D): 4.1 cm2 SHANELL(V,D): 4.4 cm2 LV V1 max P.8 mmHg LV V1 max: 130.4 cm/sec LV V1 VTI: 22.0 cm SV(LVOT): 103.6 ml SI(LVOT): 36.5 ml/m2 AV Jim Ratio (DI): 0.94 SHANELL Index (cm2/m2): 1.4 E/E' av.8 Lateral E/e': 18.5 Medial E/e': 15.1  ______________________________________________________________________________ Report approved by: Lian Khoury 2022 10:51 AM       US Lower Extremity Venous Duplex Bilateral    Result Date: 2022  EXAM: US LOWER EXTREMITY VENOUS DUPLEX BILATERAL LOCATION: [facility] DATE/TIME: 2022 5:04 PM INDICATION: PE on CT, concern for DVT. COMPARISON: None. TECHNIQUE: Venous duplex ultrasound of  bilateral lower extremities with and without compression, augmentation and duplex. Color flow and spectral Doppler with waveform analysis performed. FINDINGS: Exam includes the common femoral, femoral, popliteal veins as well as segmentally visualized deep calf veins and greater saphenous vein. RIGHT: No deep vein thrombosis. No superficial thrombophlebitis. No popliteal cyst. LEFT: No deep vein thrombosis. No superficial thrombophlebitis. No popliteal cyst.     IMPRESSION: No deep venous thrombosis in the bilateral lower extremities. Visualization of the veins is technically limited by body habitus.    CT Chest Pulmonary Embolism w Contrast    Result Date: 2/8/2022  EXAM: CT CHEST PULMONARY EMBOLISM W CONTRAST LOCATION: [facility] DATE/TIME: 2/8/2022 3:03 PM INDICATION: COVID. Dyspnea. Fever. Weakness. COMPARISON: 03/05/2021 CT TECHNIQUE: CT chest pulmonary angiogram during arterial phase injection of IV contrast. Multiplanar reformats and MIP reconstructions were performed. Dose reduction techniques were used. CONTRAST: isovue 370 100ml FINDINGS: ANGIOGRAM CHEST: Enlarged main pulmonary artery is 4 cm diameter consistent with pulmonary arterial hypertension. Small nonocclusive thrombus right upper lobe pulmonary artery (series 5, image 66). No additional thrombi seen. Thoracic aorta is negative for dissection. RV/LV ratio abnormal, greater than 1.0. LUNGS AND PLEURA: Diffuse lung mosaic attenuation, similar to previous which can be seen with pulmonary arterial hypertension. No right upper and middle lobe groundglass/consolidative infiltrates suggesting an acute pneumonia. No pleural effusion. MEDIASTINUM/AXILLAE: No adenopathy. CORONARY ARTERY CALCIFICATION: None. UPPER ABDOMEN: Absent gallbladder MUSCULOSKELETAL: Upper chest wall venous collaterals. Spinal degenerative change.     IMPRESSION: 1.  Small nonocclusive right upper lobe pulmonary embolism. 2.  Pulmonary arterial hypertension.  3.  Right heart dysfunction. 4.  New right lung infiltrates compatible with pneumonia. 5.  Report called to Dr. Guillen at 3:30 PM.          Latest radiology report personally reviewed.    Note created using dragon voice recognition software so sounds alike errors may have escaped editing.    02/12/2022   JEFRY SMITH MD  HOSPITALIST, [facility]  PAGER NO. [phone]

## 2022-02-13 ENCOUNTER — APPOINTMENT (OUTPATIENT)
Dept: PHYSICAL THERAPY | Facility: HOSPITAL | Age: 47
DRG: 871 | End: 2022-02-13
Payer: COMMERCIAL

## 2022-02-13 DIAGNOSIS — I26.93 SINGLE SUBSEGMENTAL PULMONARY EMBOLISM WITHOUT ACUTE COR PULMONALE (H): ICD-10-CM

## 2022-02-13 DIAGNOSIS — I51.89 MODERATE RIGHT VENTRICULAR SYSTOLIC DYSFUNCTION: Primary | ICD-10-CM

## 2022-02-13 DIAGNOSIS — G47.33 OSA ON CPAP: ICD-10-CM

## 2022-02-13 LAB
ANION GAP SERPL CALCULATED.3IONS-SCNC: 10 MMOL/L (ref 5–18)
BACTERIA SPT CULT: NORMAL
BUN SERPL-MCNC: 15 MG/DL (ref 8–22)
CALCIUM SERPL-MCNC: 9.6 MG/DL (ref 8.5–10.5)
CHLORIDE BLD-SCNC: 96 MMOL/L (ref 98–107)
CO2 SERPL-SCNC: 33 MMOL/L (ref 22–31)
CREAT SERPL-MCNC: 0.75 MG/DL (ref 0.6–1.1)
GFR SERPL CREATININE-BSD FRML MDRD: >90 ML/MIN/1.73M2
GLUCOSE BLD-MCNC: 102 MG/DL (ref 70–125)
GRAM STAIN RESULT: NORMAL
HOLD SPECIMEN: NORMAL
MAGNESIUM SERPL-MCNC: 2.1 MG/DL (ref 1.8–2.6)
POTASSIUM BLD-SCNC: 3.6 MMOL/L (ref 3.5–5)
SODIUM SERPL-SCNC: 139 MMOL/L (ref 136–145)

## 2022-02-13 PROCEDURE — 250N000013 HC RX MED GY IP 250 OP 250 PS 637: Performed by: INTERNAL MEDICINE

## 2022-02-13 PROCEDURE — 99232 SBSQ HOSP IP/OBS MODERATE 35: CPT | Performed by: INTERNAL MEDICINE

## 2022-02-13 PROCEDURE — 250N000011 HC RX IP 250 OP 636: Performed by: INTERNAL MEDICINE

## 2022-02-13 PROCEDURE — 87205 SMEAR GRAM STAIN: CPT | Performed by: INTERNAL MEDICINE

## 2022-02-13 PROCEDURE — 80048 BASIC METABOLIC PNL TOTAL CA: CPT | Performed by: INTERNAL MEDICINE

## 2022-02-13 PROCEDURE — 36415 COLL VENOUS BLD VENIPUNCTURE: CPT | Performed by: INTERNAL MEDICINE

## 2022-02-13 PROCEDURE — 83735 ASSAY OF MAGNESIUM: CPT | Performed by: INTERNAL MEDICINE

## 2022-02-13 PROCEDURE — 99233 SBSQ HOSP IP/OBS HIGH 50: CPT | Performed by: INTERNAL MEDICINE

## 2022-02-13 PROCEDURE — 97535 SELF CARE MNGMENT TRAINING: CPT | Mod: GP | Performed by: PHYSICAL THERAPIST

## 2022-02-13 PROCEDURE — 99233 SBSQ HOSP IP/OBS HIGH 50: CPT | Mod: 25 | Performed by: INTERNAL MEDICINE

## 2022-02-13 PROCEDURE — 250N000013 HC RX MED GY IP 250 OP 250 PS 637: Performed by: FAMILY MEDICINE

## 2022-02-13 PROCEDURE — 999N000157 HC STATISTIC RCP TIME EA 10 MIN

## 2022-02-13 PROCEDURE — 210N000001 HC R&B IMCU HEART CARE

## 2022-02-13 PROCEDURE — 99207 PR NO CHARGE LOS: CPT | Performed by: INTERNAL MEDICINE

## 2022-02-13 RX ORDER — POTASSIUM CHLORIDE 1500 MG/1
40 TABLET, EXTENDED RELEASE ORAL ONCE
Status: COMPLETED | OUTPATIENT
Start: 2022-02-13 | End: 2022-02-13

## 2022-02-13 RX ORDER — POTASSIUM CHLORIDE 1500 MG/1
20 TABLET, EXTENDED RELEASE ORAL ONCE
Status: COMPLETED | OUTPATIENT
Start: 2022-02-13 | End: 2022-02-13

## 2022-02-13 RX ADMIN — POTASSIUM CHLORIDE 20 MEQ: 1500 TABLET, EXTENDED RELEASE ORAL at 09:06

## 2022-02-13 RX ADMIN — GABAPENTIN 200 MG: 100 CAPSULE ORAL at 19:54

## 2022-02-13 RX ADMIN — POTASSIUM CHLORIDE 40 MEQ: 1500 TABLET, EXTENDED RELEASE ORAL at 13:29

## 2022-02-13 RX ADMIN — METOPROLOL SUCCINATE 25 MG: 25 TABLET, EXTENDED RELEASE ORAL at 09:04

## 2022-02-13 RX ADMIN — GABAPENTIN 200 MG: 100 CAPSULE ORAL at 09:07

## 2022-02-13 RX ADMIN — FUROSEMIDE 40 MG: 10 INJECTION, SOLUTION INTRAVENOUS at 19:54

## 2022-02-13 RX ADMIN — RIVAROXABAN 15 MG: 15 TABLET, FILM COATED ORAL at 17:46

## 2022-02-13 RX ADMIN — MICONAZOLE NITRATE: 20 POWDER TOPICAL at 09:10

## 2022-02-13 RX ADMIN — RIVAROXABAN 15 MG: 15 TABLET, FILM COATED ORAL at 09:04

## 2022-02-13 RX ADMIN — MICONAZOLE NITRATE: 20 POWDER TOPICAL at 19:55

## 2022-02-13 RX ADMIN — GABAPENTIN 200 MG: 100 CAPSULE ORAL at 13:29

## 2022-02-13 RX ADMIN — CEFTRIAXONE SODIUM 2 G: 2 INJECTION, POWDER, FOR SOLUTION INTRAMUSCULAR; INTRAVENOUS at 13:28

## 2022-02-13 RX ADMIN — LISINOPRIL 10 MG: 5 TABLET ORAL at 09:09

## 2022-02-13 RX ADMIN — SERTRALINE HYDROCHLORIDE 100 MG: 50 TABLET ORAL at 09:07

## 2022-02-13 RX ADMIN — FUROSEMIDE 40 MG: 10 INJECTION, SOLUTION INTRAVENOUS at 06:18

## 2022-02-13 ASSESSMENT — ACTIVITIES OF DAILY LIVING (ADL)
ADLS_ACUITY_SCORE: 27
ADLS_ACUITY_SCORE: 27
ADLS_ACUITY_SCORE: 25
ADLS_ACUITY_SCORE: 23
ADLS_ACUITY_SCORE: 27
ADLS_ACUITY_SCORE: 23
ADLS_ACUITY_SCORE: 27
ADLS_ACUITY_SCORE: 23
ADLS_ACUITY_SCORE: 27
ADLS_ACUITY_SCORE: 23
ADLS_ACUITY_SCORE: 27
ADLS_ACUITY_SCORE: 25
ADLS_ACUITY_SCORE: 23

## 2022-02-13 ASSESSMENT — MIFFLIN-ST. JEOR: SCORE: 2324.62

## 2022-02-13 NOTE — PROGRESS NOTES
"  HEART CARE CONSULTATON NOTE        Assessment/Recommendations   Assessment:   1.  Acute heart failure preserved ejection fraction. WT down >20 lb since admission.  -15,000 liter I/O  2.  Right ventricular systolic dysfunction  3. COVID PNA  4.  Acute PE    Plan:   1. Continue 40 mg IV BID , transition to orals tomorrow or Monday.    2. Continue lisinopril  3. Continue metoprolol   4. Anticoagulation for acute PE per medicine team.   5. 40 mEq KDUR today.         History of Present Illness/Subjective      S: Subjectively patient is doing better.  Her weight is down since admission about 20 pounds.  -1500 L.  Reduced her down to Lasix 40 mg IV twice daily continues to be net negative.  We will continue with IV Lasix.  Continues to have moderate pitting edema.  Blood pressure is stable today.  Renal function continues show improvement.  She denies any significant orthopnea today.  No lightheadedness with standing.  No nausea or vomiting.  No fever chills or sweats.  No active chest pain.  Overall she feels better than when she was admitted.  Oxygenation is improving.  Satting 92% on 2 L nasal cannula     Physical Examination, Review of Systems   VITALS: /69   Pulse 84   Temp 98.2  F (36.8  C) (Oral)   Resp 22   Ht 1.626 m (5' 4\")   Wt (!) 170.5 kg (375 lb 12.8 oz)   LMP 01/31/2022   SpO2 92%   BMI 64.51 kg/m    BMI: Body mass index is 64.51 kg/m .  Wt Readings from Last 3 Encounters:   02/13/22 (!) 170.5 kg (375 lb 12.8 oz)   05/25/21 (!) 181.4 kg (400 lb)   03/05/21 (!) 198 kg (436 lb 8.2 oz)       Intake/Output Summary (Last 24 hours) at 2/12/2022 1124  Last data filed at 2/12/2022 1030  Gross per 24 hour   Intake 1048 ml   Output 9100 ml   Net -8052 ml     General Appearance:   n obese body habitus.  Lying in bed   ENT/Mouth:  Mucous membranes moist   EYES:   No scleral icterus   Neck:  Supple.   Chest/Lungs:    Coarse lung sounds.   Cardiovascular:    Distant.  Regular.  No murmurs   Abdomen:  no " organomegaly, masses, bruits, or tenderness; bowel sounds are present   Extremities: Bilateral leg dressings, mild to moderate pitting edema   Skin:    Neurologic:  Alert and oriented     Psychiatric:  Calm.  Pleasant            Lab Results    Chemistry/lipid CBC Cardiac Enzymes/BNP/TSH/INR   Recent Labs   Lab Test 02/11/16  0942   CHOL 162   HDL 64   LDL 75   TRIG 113     Recent Labs   Lab Test 02/11/16  0942 08/20/15  1206   LDL 75 102     Recent Labs   Lab Test 02/12/22  0454      POTASSIUM 3.5   CHLORIDE 97*   CO2 31   GLC 98   BUN 13   CR 0.81   GFRESTIMATED 90   EVITA 8.9     Recent Labs   Lab Test 02/12/22  0454 02/11/22  0502 02/10/22  1950   CR 0.81 0.86 0.81     Recent Labs   Lab Test 02/13/20  1641 06/26/17  1400 02/11/16  0942   A1C 5.7* 5.6 6.2*          Recent Labs   Lab Test 02/11/22  0502   WBC 5.9   HGB 12.6   HCT 42.3   MCV 90        Recent Labs   Lab Test 02/11/22  0502 02/10/22  0533 02/09/22  0457   HGB 12.6 11.8 12.7    Recent Labs   Lab Test 02/09/22  0457 02/08/22  2012   TROPONINI 0.33* 0.62*     Recent Labs   Lab Test 02/08/22  1620   BNP 2,230*     No results for input(s): TSH in the last 97425 hours.  Recent Labs   Lab Test 02/11/22  0502   INR 1.23*        Medical History  Surgical History Family History Social History   Past Medical History:   Diagnosis Date     Acute on chronic diastolic congestive heart failure (H) 2/9/2022     Arthritis 2019    Hips     ASCUS favor benign 8/2015    Neg HPV     Depressive disorder 2010     H/O colposcopy with cervical biopsy 9/14/15    Bx & ECC - negative     Hypertension 2019     LSIL on Pap smear 7/2014    Neg high risk HPV     Multiple sclerosis (H) 8/29/2005 9/18/200pt dx with MS 2004--dx by neurolgist and is followed by Dr. Steven Ayala 10/2016     UNSPEC CONSTIPATION 9/18/2006 9/18/2006   Has tried otc suppository and otc lax.      Past Surgical History:   Procedure Laterality Date     CHOLECYSTECTOMY, LAPOROSCOPIC       Cholecystectomy, Laparoscopic     COLONOSCOPY  2007?    Bathroom issue..results normal     OPEN REDUCTION INTERNAL FIXATION TOE(S)  4/13/2012    Procedure:OPEN REDUCTION INTERNAL FIXATION TOE(S); Open reduction internal fixation right proximal fifth metatarsal fracture-   Anes-choice block; Surgeon:LEY, JEFFREY DUANE; Location:[place] OR     Family History   Problem Relation Age of Onset     Neurologic Disorder Father         MS     Heart Disease Father         irregular heart rate     Diabetes Father      Melanoma Father      Sleep Apnea Father      Other Cancer Father      Cancer Maternal Grandfather         lung     Other Cancer Maternal Grandfather      Cancer Paternal Grandmother         stomach     Other Cancer Paternal Grandmother      Cancer Mother      Other Cancer Mother      Thyroid Disease Mother      Gynecology Maternal Aunt         PCOS     Hypertension Maternal Grandmother      Anxiety Disorder Maternal Grandmother      Thyroid Disease Maternal Grandmother      Anxiety Disorder Sister         Social History     Socioeconomic History     Marital status: Single     Spouse name: Not on file     Number of children: Not on file     Years of education: Not on file     Highest education level: Not on file   Occupational History     Employer: [employer]   Tobacco Use     Smoking status: Never Smoker     Smokeless tobacco: Never Used   Vaping Use     Vaping Use: Never used   Substance and Sexual Activity     Alcohol use: Yes     Comment: social     Drug use: No     Sexual activity: Not Currently     Partners: Male     Birth control/protection: Pill   Other Topics Concern     Parent/sibling w/ CABG, MI or angioplasty before 65F 55M? No   Social History Narrative    , 3rd-5th grade     Social Determinants of Health     Financial Resource Strain: Low Risk      Difficulty of Paying Living Expenses: Not hard at all   Food Insecurity: No Food Insecurity     Worried About Running Out of Food  in the Last Year: Never true     Ran Out of Food in the Last Year: Never true   Transportation Needs: No Transportation Needs     Lack of Transportation (Medical): No     Lack of Transportation (Non-Medical): No   Physical Activity: Not on file   Stress: Not on file   Social Connections: Not on file   Intimate Partner Violence: Not At Risk     Fear of Current or Ex-Partner: No     Emotionally Abused: No     Physically Abused: No     Sexually Abused: No   Housing Stability: Not on file         Medications  Allergies   No current outpatient medications on file.        Allergies   Allergen Reactions     Nkda [No Known Drug Allergies]          Ced Torres DO

## 2022-02-13 NOTE — PLAN OF CARE
Assumed care 2300 to 0730. A&O x 4. Assist x 2, bedfast. Tele is NSR. Denies pain. 2L O2 via oxy mask. 5L O2 with CPAP while sleeping. Brar in place, bag below bladder. Call light within reach, able to make needs known. Bed alarm on for safety.      Problem: Infection Progression (Sepsis)  Goal: Absence of Infection Signs and Symptoms  Intervention: Promote Recovery  Recent Flowsheet Documentation  Taken 2/13/2022 0030 by RAGHAV PINO  Activity Management: activity encouraged     Problem: Gas Exchange Impaired  Goal: Optimal Gas Exchange  Intervention: Optimize Oxygenation and Ventilation  Recent Flowsheet Documentation  Taken 2/13/2022 0030 by RAGHAV PINO  Head of Bed (HOB) Positioning: HOB at 20-30 degrees

## 2022-02-13 NOTE — PROGRESS NOTES
Daily Progress Note    CODE STATUS:  Full Code    02/09/22  Assessment/Plan:  Bess Enamorado is a 46 year old female with a recent COVID infection on 12/2021 who received monoclonal antibody, MS, chronic lymphedema, hypertension, depression, NARCISA, PCOS, MS who admitted for fever, headache, increased leg swelling and cough found to have pneumonia, repeat positive Covid PCR, cellulitis of lower extremity, acute hypoxic respiratory failure, CHF exacerbation, CT chest with PE.     Acute hypoxic respiratory failure; likely multifactorial etiology-CHF exacerbation, pulmonary embolism, possible bacterial pneumonia, COVID-19 pneumonia, physical deconditioning.    History of NARCISA on home CPAP  --Patient required BiPAP on admission.   --On 2/11, discontinued nighttime BiPAP, resumed home CPAP, tolerating  --On 2/12, oxygen titrated to 2 L.  --Incentive spirometry, flutter valve.  --Treat underlying cause as mentioned below  --Appreciate pulmonary input.    Acute heart failure with preserved EF;  --On admission BNP 2230 and clinically looked volume overload  --Echo reported EF 65%.  Moderately decreased right ventricular systolic function.    --Lasix drip with good response (16L negative balance), now transitioned to IV Lasix push on 2/12, cardiology managing diuretics  --Monitor intake/output, weight, labs closely.  --Appreciated cardiology input.     Elevated troponin; likely demand ischemia in the setting of acute diastolic heart failure, pulmonary embolism, COVID-19  --Echo result mentioned above.  Troponin trending down.    --Cardiologist recommended no further ischemic evaluation at this time, can consider stress testing later and defer timing to cardiology team.    Acute right upper lobe pulmonary embolism;  --CT chest also reported pulmonary artery hypertension and Right heart dysfunction.  --Echo as mentioned above.  --Bilateral lower extremity US negative for DVT.  --Heparin drip transition to Xarelto,  continue.  --Appreciated pulmonary input, recommended 3 months of anticoagulation and repeat TTE in 3 months to reeval RV function.  Follow-up with pulmonologist as an outpatient.    Positive COVID-19 test on 12/11/2021 and 2/8/22;  Sepsis likely due to left lower extremity cellulitis;  Positive blood culture; likely contamination  --On admission, CT chest reported new right lung infiltrate compatible with pneumonia.  --On admission, elevated procalcitonin and elevated WBC count.  Normal lactic acid.  --Completed 3 days course of remdesivir per ID recommendation.  --Appreciated ID input, reported fever likely due to LLE cellulitis and less likely bacterial pneumonia.  --On 2/10, IV Vanco discontinued.   --On 2/11 IV Zosyn changed to IV Rocephin.  Appreciated ID input.  --Continue clinical monitoring, lab monitoring  --Wound care nurse on case    Chronic lower extremity lymphedema;  --Initiated lymphedema wrap, continue    Essential hypertension; controlled  --Continue home metoprolol, lisinopril.  Hydralazine as needed     History of MS;  No longer taking medication.  Primarily has left lower extremity weakness and pain.    --PT OT.     Morbid obesity;  Body mass index is 64.51 kg/m .  --Weight reduction program, defer to primary as an outpatient.     Disposition; pending  Barrier to discharge; multiple medical issues, refer above     LOS: 1 day     Subjective:  Interval History: Patient seen and examined. Notes, labs, imaging reports personally reviewed.  Patient reported breathing continued to improve.  Denied cough, shortness of breath or chest pain.  Denied abdomen pain, nausea, vomiting.  Reported lower extremity swelling and pain significantly better.  Discussed with nursing staffs.  Patient declined me to call any family members.    Review of Systems:   As mentioned in subjective.    Patient Active Problem List   Diagnosis     Multiple sclerosis (H)     Polycystic ovaries     Constipation     CARDIOVASCULAR  SCREENING; LDL GOAL LESS THAN 160     Anxiety     Restless legs     Leg edema     Eating disorder     Papanicolaou smear of cervix with low grade squamous intraepithelial lesion (LGSIL)     Morbid obesity (H)     Peroneal tendon rupture     Benign essential hypertension     NARCISA (obstructive sleep apnea)     Moderate episode of recurrent major depressive disorder (H)     Cellulitis of abdominal wall     History of abnormal cervical Pap smear     Generalized anxiety disorder     Lymphedema of both lower extremities     Tachycardia     SIRS (systemic inflammatory response syndrome) (H)     Cellulitis of right leg     Cellulitis     Pneumonia due to infectious organism, unspecified laterality, unspecified part of lung     Acute pulmonary embolism without acute cor pulmonale, unspecified pulmonary embolism type (H)     COVID-19     Acute on chronic diastolic congestive heart failure (H)     Acute respiratory failure with hypoxia (H)     NSTEMI (non-ST elevated myocardial infarction) (H)       Scheduled Meds:    cefTRIAXone  2 g Intravenous Q24H     furosemide  40 mg Intravenous Q12H     gabapentin  200 mg Oral TID     lisinopril  10 mg Oral Daily     metoprolol succinate ER  25 mg Oral Daily     miconazole   Topical BID     [START ON 3/4/2022] rivaroxaban ANTICOAGULANT  20 mg Oral Daily with supper    Followed by     rivaroxaban ANTICOAGULANT  15 mg Oral BID w/meals     sertraline  100 mg Oral Daily     sodium chloride (PF)  3 mL Intravenous Q8H     Continuous Infusions:    - MEDICATION INSTRUCTIONS -       PRN Meds:.acetaminophen **OR** acetaminophen, lidocaine 4%, lidocaine (buffered or not buffered), loperamide, melatonin, ondansetron **OR** ondansetron, oxymetazoline, - MEDICATION INSTRUCTIONS -, senna-docusate **OR** senna-docusate, sodium chloride, sodium chloride (PF), sodium chloride (PF)    Objective:  Vital signs in last 24 hours:  Temp:  [98  F (36.7  C)-99.3  F (37.4  C)] 98.2  F (36.8  C)  Pulse:  []  84  Resp:  [20-22] 22  BP: (108-123)/(59-78) 117/69  SpO2:  [72 %-98 %] 94 %      Intake/Output Summary (Last 24 hours) at 2/9/2022 1502  Last data filed at 2/9/2022 1400  Gross per 24 hour   Intake 1150 ml   Output 3100 ml   Net -1950 ml       Physical Exam:  General: Not in obvious distress.  HEENT: Normocephalic, supple neck  Chest: Clear to auscultation bilateral anteriorly, no wheezing  Heart: S1S2 normal, regular  Abdomen: Soft.  Morbidly obese. Bowel sounds- active.  Extremities: Bilateral lower extremity lymphedema wrap  Neuro: alert and awake, moves all extremities but has generalized motor weakness      Lab Results:(I have personally reviewed the results)    Recent Results (from the past 24 hour(s))   Magnesium    Collection Time: 02/13/22  5:55 AM   Result Value Ref Range    Magnesium 2.1 1.8 - 2.6 mg/dL   Basic metabolic panel    Collection Time: 02/13/22  5:55 AM   Result Value Ref Range    Sodium 139 136 - 145 mmol/L    Potassium 3.6 3.5 - 5.0 mmol/L    Chloride 96 (L) 98 - 107 mmol/L    Carbon Dioxide (CO2) 33 (H) 22 - 31 mmol/L    Anion Gap 10 5 - 18 mmol/L    Urea Nitrogen 15 8 - 22 mg/dL    Creatinine 0.75 0.60 - 1.10 mg/dL    Calcium 9.6 8.5 - 10.5 mg/dL    Glucose 102 70 - 125 mg/dL    GFR Estimate >90 >60 mL/min/1.73m2   Extra Purple Top Tube    Collection Time: 02/13/22  6:11 AM   Result Value Ref Range    Hold Specimen JIC    Extra Blue Top Tube    Collection Time: 02/13/22  6:12 AM   Result Value Ref Range    Hold Specimen JIC    Extra Red Top Tube    Collection Time: 02/13/22  6:12 AM   Result Value Ref Range    Hold Specimen JIC          Serum Glucose range:   Recent Labs   Lab 02/13/22  0555 02/12/22  0454 02/11/22  0502 02/10/22  0534    98 97 107     ABG:   Recent Labs   Lab 02/08/22 2329   PH 7.46*   PCO2 34*   PO2 63*   HCO3 25     CBC:   Recent Labs   Lab 02/11/22  0502 02/10/22  0533 02/09/22  0457 02/08/22  1620 02/08/22  1115   WBC 5.9 7.0 10.0   < > 15.3*   HGB 12.6 11.8  12.7   < > 13.7   HCT 42.3 39.2 41.5   < > 43.1   MCV 90 90 88   < > 86    213 212   < > 269   NEUTROPHIL  --   --   --   --  86   LYMPH  --   --   --   --  8   MONOCYTE  --   --   --   --  5   EOSINOPHIL  --   --   --   --  0    < > = values in this interval not displayed.     Chemistry:   Recent Labs   Lab 22  0555 22  0454 22  1315 22  0502 22  0457 22  1115    140  --  140   < > 134*   POTASSIUM 3.6 3.5 3.6 3.4*   < > 4.3   CHLORIDE 96* 97*  --  99   < > 105   CO2 33* 31  --  33*   < > 18*   BUN 15 13  --  14   < > 16   CR 0.75 0.81  --  0.86   < > 0.76   GFRESTIMATED >90 90  --  83   < > >90   EVITA 9.6 8.9  --  8.5   < > 9.5   MAG 2.1 1.8  --  1.9   < > 1.6*   PROTTOTAL  --   --   --   --   --  7.3   ALBUMIN  --   --   --   --   --  3.1*   AST  --   --   --   --   --  18   ALT  --   --   --   --   --  12   ALKPHOS  --   --   --   --   --  40*   BILITOTAL  --   --   --   --   --  2.3*    < > = values in this interval not displayed.     Coags:  Recent Labs   Lab 22  0502   INR 1.23*     Cardiac Markers:  Recent Labs   Lab 22   TROPONINI 0.33*        Echocardiogram Complete    Result Date: 2022  [id number]^TIANA^JORJE^[place] [address]  Name: CITLALY MICHEL MRN: [id number] : 1975 Study Date: 2022 09:28 AM Age: 46 yrs Gender: Female Patient Location: [facility] Reason For Study: CHF Ordering Physician: JORJE MONTIEL Performed By: BEN  BSA: 2.8 m2 Height: 64 in Weight: 480 lb HR: 97 ______________________________________________________________________________ Procedure Complete Portable Echo Adult. Definity (NDC #80618-468) given intravenously. No hemodynamically significant valvular abnormalities on 2D or color flow imaging. Technically difficult, suboptimal study. There is no comparison study available.  ______________________________________________________________________________ Interpretation Summary  1.Left ventricular size, wall motion and function are normal. The ejection fraction is > 65%. 2.Moderately decreased right ventricular systolic function 3.No hemodynamically significant valvular abnormalities on 2D or color flow imaging. 4.Technically difficult, suboptimal study due to patient size. There is no comparison study available. ______________________________________________________________________________ I      WMSI = 1.00     % Normal = 100  X - Cannot   0 -                      (2) - Mildly 2 -          Segments  Size Interpret    Hyperkinetic 1 - Normal  Hypokinetic  Hypokinetic  1-2     small                                                    7 -          3-5    moderate 3 - Akinetic 4 -          5 -         6 - Akinetic Dyskinetic   6-14    large              Dyskinetic   Aneurysmal  w/scar       w/scar       15-16   diffuse  Left Ventricle Left ventricular size, wall motion and function are normal. The ejection fraction is > 65%. There is normal left ventricular wall thickness. Left ventricular diastolic function is normal. No regional wall motion abnormalities noted.  Right Ventricle The right ventricle is mildly dilated. Moderately decreased right ventricular systolic function. The right ventricle is not well visualized.  Atria The left atrium is not well visualized. The left atrium is mildly dilated. Right atrial size is normal. There is no color Doppler evidence of an atrial shunt.  Mitral Valve The mitral valve is not well visualized. There is no mitral regurgitation noted. There is no mitral valve stenosis.  Tricuspid Valve The tricuspid valve is not well visualized, but is grossly normal. The tricuspid valve is not well visualized. Right ventricle systolic pressure estimate normal. There is physiologic tricuspid regurgitation. There is no tricuspid stenosis.  Aortic Valve Aortic valve  leaflets appear normal. There is no evidence of aortic stenosis or clinically significant aortic regurgitation. The aortic valve is not well visualized. No aortic regurgitation is present. No aortic stenosis is present.  Pulmonic Valve The pulmonic valve is not well seen, but is grossly normal. The pulmonic valve is not well visualized. This degree of valvular regurgitation is within normal limits. There is no pulmonic valvular stenosis.  Vessels The aorta root is normal. Normal size ascending aorta. IVC diameter <2.1 cm collapsing >50% with sniff suggests a normal RA pressure of 3 mmHg.  Pericardium There is no pericardial effusion.  Rhythm Sinus rhythm was noted.  ______________________________________________________________________________ MMode/2D Measurements & Calculations Ao root diam: 3.1 cm asc Aorta Diam: 3.6 cm  LVOT diam: 2.4 cm LVOT area: 4.7 cm2  Doppler Measurements & Calculations MV E max jim: 96.7 cm/sec MV A max jim: 121.8 cm/sec MV E/A: 0.79 MV dec slope: 538.6 cm/sec2 MV dec time: 0.18 sec Ao V2 max: 138.1 cm/sec Ao max P.0 mmHg Ao V2 mean: 87.3 cm/sec Ao mean PG: 3.7 mmHg Ao V2 VTI: 25.2 cm SHANELL(I,D): 4.1 cm2 SHANELL(V,D): 4.4 cm2 LV V1 max P.8 mmHg LV V1 max: 130.4 cm/sec LV V1 VTI: 22.0 cm SV(LVOT): 103.6 ml SI(LVOT): 36.5 ml/m2 AV Jim Ratio (DI): 0.94 SHANELL Index (cm2/m2): 1.4 E/E' av.8 Lateral E/e': 18.5 Medial E/e': 15.1  ______________________________________________________________________________ Report approved by: Lian Khoury 2022 10:51 AM       US Lower Extremity Venous Duplex Bilateral    Result Date: 2022  EXAM: US LOWER EXTREMITY VENOUS DUPLEX BILATERAL LOCATION: [facility] DATE/TIME: 2022 5:04 PM INDICATION: PE on CT, concern for DVT. COMPARISON: None. TECHNIQUE: Venous duplex ultrasound of bilateral lower extremities with and without compression, augmentation and duplex. Color flow and spectral Doppler with waveform analysis  performed. FINDINGS: Exam includes the common femoral, femoral, popliteal veins as well as segmentally visualized deep calf veins and greater saphenous vein. RIGHT: No deep vein thrombosis. No superficial thrombophlebitis. No popliteal cyst. LEFT: No deep vein thrombosis. No superficial thrombophlebitis. No popliteal cyst.     IMPRESSION: No deep venous thrombosis in the bilateral lower extremities. Visualization of the veins is technically limited by body habitus.    CT Chest Pulmonary Embolism w Contrast    Result Date: 2/8/2022  EXAM: CT CHEST PULMONARY EMBOLISM W CONTRAST LOCATION: [facility] DATE/TIME: 2/8/2022 3:03 PM INDICATION: COVID. Dyspnea. Fever. Weakness. COMPARISON: 03/05/2021 CT TECHNIQUE: CT chest pulmonary angiogram during arterial phase injection of IV contrast. Multiplanar reformats and MIP reconstructions were performed. Dose reduction techniques were used. CONTRAST: isovue 370 100ml FINDINGS: ANGIOGRAM CHEST: Enlarged main pulmonary artery is 4 cm diameter consistent with pulmonary arterial hypertension. Small nonocclusive thrombus right upper lobe pulmonary artery (series 5, image 66). No additional thrombi seen. Thoracic aorta is negative for dissection. RV/LV ratio abnormal, greater than 1.0. LUNGS AND PLEURA: Diffuse lung mosaic attenuation, similar to previous which can be seen with pulmonary arterial hypertension. No right upper and middle lobe groundglass/consolidative infiltrates suggesting an acute pneumonia. No pleural effusion. MEDIASTINUM/AXILLAE: No adenopathy. CORONARY ARTERY CALCIFICATION: None. UPPER ABDOMEN: Absent gallbladder MUSCULOSKELETAL: Upper chest wall venous collaterals. Spinal degenerative change.     IMPRESSION: 1.  Small nonocclusive right upper lobe pulmonary embolism. 2.  Pulmonary arterial hypertension. 3.  Right heart dysfunction. 4.  New right lung infiltrates compatible with pneumonia. 5.  Report called to Dr. Guillen at 3:30 PM.    Latest  radiology report personally reviewed.    Note created using dragon voice recognition software so sounds alike errors may have escaped editing.    02/13/2022   JEFRY SMITH MD  HOSPITALIST, [facility]  PAGER NO. [phone]

## 2022-02-13 NOTE — PROGRESS NOTES
[facility]:  PULMONARY PROGRESS NOTE     Date / Time of Admission:  2/8/2022 10:54 AM    ID: Bess Enamorado is a 46 year old female with severe morbid obesity (BMI 67.94 kg/m2) with physical deconditioning, obstructive sleep apnea on CPAP (consistently since Summer 2021), essential hypertension, multiple sclerosis, polycystic ovarian syndrome, history of teratoma - monitoring, chronic lymphedema, and prior diagnosis of COVID-19 12/6/2021 s/p monoclonal antibody who was admitted to [facility] on 2/8/2022 with concern for recurrent COVID pneumonia and possible superimposed bacterial infection, small R upper lobe pulmonary embolism, respiratory failure intermittently requiring NIPPV, and lower extremity cellulitis.       Reason for Consult:  Respiratory failure         Assessment: 1.   Acute respiratory failure with hypoxia and hypercapnia.  Multifactorial including volume overload/pulmonary edema with CHF exacerbation, possible COVID-19 viral pneumonia (although less likely) and/or superimposed bacterial pneumonia.  Patient also with small RUL pulmonary embolism, although unlikely to contribute significantly to symptoms.  Has severe morbid obesity, with underlying NARCISA on CPAP.  2. COVID-19 viral infection and possible pneumonia.  Prior history of COVID-19 12/6/2021, received monoclonal antibody.  Now with repeat positivity, question if there is a new strain present or if this is residual from prior event.    3. Small right upper lung subsegmental pulmonary embolism.  Noted on CT chest 2/8.  Duplex ultrasound negative for DVT in lower extremities.  Given recurrent Covid infections, likely hypercoagulable in the state.  Also, has had severe immobility due to body habitus.  4. Obstructive sleep apnea on CPAP.  Diagnosed sometime ago, states she has been consistently using CPAP since summer 2021.  5. Acute CHF exacerbation with moderate RV dysfunction.  RV dysfunction - would not expect any RV  dysfunction with the small PE that this patient has.  May have alternative causes of abnormal RV including NARCISA, heart failure.  Would recommend repeat TTE as outpatient in 3 months, at that time can assess extent of RV dysfunction and identify further evaluation needs.  Her body habitus is extreme and precludes much investigation.  6. Bacteremia.  Discussed with ID, Corynebacterium may be contaminant.   Blood cultures x 2, 2/8:  2 of 4 bottles + Corynebacterium striatum, 1 of 2 bottles + Staphylococcus epidermidis         Plan:     Wean supplemental O2 as tolerates, goal O2 sat > 92%.     Using home CPAP with O2 bleed in.     Transition from Oxymask to nasal cannula once can tolerate.  Having intermittent epistaxis currently so using Oxymask w/ bubbler.    On ceftriaxone IV for pneumonia/cellulitis/bacteremia per ID.    No indication for steroids.  Received Remdesivir x 3 days per ID.    Doing great with diuresis per Cardiology.    Will defer electrolyte management/monitoring to Cardiology as they are managing diuretics.    Recommend 3 months of anticoagulation for PE.      On Xarelto 15 mg BID x 20 days, then 20 mg daily.    Given BMI, uncertain how absorption with DOAC will be.     Discussed with pharmacy, consultation placed.  They will look into monitoring needs, opportunities to check serum trough/levels once at steady state in 4 weeks.  They will set her up for MTM (medicaiton therapy management).  I discussed this with patient.    Recommend repeat TTE in 3 months after treatment of PE, can re-assess RV at that time and determine additional follow-up needs.     Pulmonary hygiene.  Encourage incentive spirometry Q2H while awake.      Discussed with patient to also use when goes home given body habitus, increased risk of hypoventilation/atelectasis.    Pulmonary Outpatient Clinic referral placed on discharge instructions in 3 months.  Will send clinic note and order for outpatient TTE in 3 months    Patient  "and/or family were educated on the above recommendations.   Thank you for allowing our service to participate in the care of this patient.  Please call with any questions or concerns.  Pulmonary Service will sign off.  Please call if any questions/concerns.    Total patient care time: 35 minutes, with over 50% spent in counseling/coordination of care.      Hilda Benedict MD, MPH  Pulmonary/Critical Care Medicine  02/13/2022  11:10 AM        Subjective/Interval History:   Patient feels better today.  Feels there is still edema in legs - dressings haven't been taken down today.  NO wheezing.  Some epistaxis, but resolves when pressure applied to nares.      Review of Systems:  Pertinent items are noted in HPI.        Allergies/Medications:   Allergies:     Allergies   Allergen Reactions     Nkda [No Known Drug Allergies]        Continuous Infusions:    - MEDICATION INSTRUCTIONS -       Scheduled Medications:    cefTRIAXone  2 g Intravenous Q24H     furosemide  40 mg Intravenous Q12H     gabapentin  200 mg Oral TID     lisinopril  10 mg Oral Daily     metoprolol succinate ER  25 mg Oral Daily     miconazole   Topical BID     [START ON 3/4/2022] rivaroxaban ANTICOAGULANT  20 mg Oral Daily with supper    Followed by     rivaroxaban ANTICOAGULANT  15 mg Oral BID w/meals     sertraline  100 mg Oral Daily     sodium chloride (PF)  3 mL Intravenous Q8H            Objective:   Vitals:  /69   Pulse 84   Temp 98.2  F (36.8  C) (Oral)   Resp 22   Ht 1.626 m (5' 4\")   Wt (!) 170.5 kg (375 lb 12.8 oz)   LMP 01/31/2022   SpO2 92%   BMI 64.51 kg/m    GEN: Pleasant female, morbidly obese, no acute distress, on Oxymask.  HEENT: Normocephalic, atraumatic.  Extraoccular eye movements intact, anicteric sclera. Moist mucous membranes.   NECK: Supple.    PULM: Non-labored breathing.  No use of accessory muscles.  Diminished breath sounds.  No wheezing.  CVS: Regular rate and rhythm.  Normal S1, S2.  No rubs, murmurs, or " gallops.    ABDOMEN: Hypooactive bowel sounds.  Obese.  Soft. Non-distended.    EXTREMITES:  No clubbing, cyanosis.  Legs just wrapped in dressings. + erythema visible below, mild tenderness to palpation.  + pitting edema.  NEURO:  Awake.  Oriented to person, place, time and situation.  Cranial nerves 2-12 grossly intact.      Intake/Output:  I/O last 3 completed shifts:  In: 509.6 [P.O.:270; I.V.:239.6]  Out: 4500 [Urine:4500]        Pertinent Studies:   All laboratory data reviewed  Serum Glucose range:   Recent Labs   Lab 02/13/22  0555 02/12/22  0454 02/11/22  0502 02/10/22  0534 02/09/22  0457 02/08/22  1115    98 97 107 116 116     ABG:   Recent Labs   Lab 02/08/22  2329   PH 7.46*   PCO2 34*   PO2 63*   HCO3 25     CBC:   Recent Labs   Lab 02/11/22  0502 02/10/22  0533 02/09/22  0457 02/08/22  1620 02/08/22  1115   WBC 5.9 7.0 10.0   < > 15.3*   HGB 12.6 11.8 12.7   < > 13.7   HCT 42.3 39.2 41.5   < > 43.1   MCV 90 90 88   < > 86    213 212   < > 269   NEUTROPHIL  --   --   --   --  86   LYMPH  --   --   --   --  8   MONOCYTE  --   --   --   --  5   EOSINOPHIL  --   --   --   --  0    < > = values in this interval not displayed.     Chemistry:   Recent Labs   Lab 02/13/22  0555 02/12/22  0454 02/11/22  1315 02/11/22  0502 02/09/22  0457 02/08/22  1115    140  --  140   < > 134*   POTASSIUM 3.6 3.5 3.6 3.4*   < > 4.3   CHLORIDE 96* 97*  --  99   < > 105   CO2 33* 31  --  33*   < > 18*   BUN 15 13  --  14   < > 16   CR 0.75 0.81  --  0.86   < > 0.76   GFRESTIMATED >90 90  --  83   < > >90   EVITA 9.6 8.9  --  8.5   < > 9.5   MAG 2.1 1.8  --  1.9   < > 1.6*   PROTTOTAL  --   --   --   --   --  7.3   ALBUMIN  --   --   --   --   --  3.1*   AST  --   --   --   --   --  18   ALT  --   --   --   --   --  12   ALKPHOS  --   --   --   --   --  40*   BILITOTAL  --   --   --   --   --  2.3*    < > = values in this interval not displayed.     Coags:  Recent Labs   Lab 02/11/22  0502   INR 1.23*      Cardiac Markers:  Recent Labs   Lab 02/09/22  0457   TROPONINI 0.33*      Microbiology:  Sputum culture, 2/13: Collected/pending.  Blood cultures x 2, 2/8:  2 of 4 bottles + Corynebacterium striatum, 1 of 2 bottles + Staphylococcus epidermidis        Cardiology/Radiology:   Cardiac: All cardiac studies reviewed by me.    EKG:  Reviewed    TTE, 2/9/22:  Summary:  1.Left ventricular size, wall motion and function are normal. The ejection fraction is > 65%. 2.Moderately decreased right ventricular systolic function. 3.No hemodynamically significant valvular abnormalities on 2D or color flow imaging. 4.Technically difficult, suboptimal study due to patient size. There is no comparison study available.    Radiology: All imaging studies reviewed by me.    CT chest PE study, 2/8/22:   FINDINGS: ANGIOGRAM CHEST: Enlarged main pulmonary artery is 4 cm diameter consistent with pulmonary arterial hypertension. Small nonocclusive thrombus right upper lobe pulmonary artery (series 5, image 66). No additional thrombi seen.  Thoracic aorta is negative for dissection. RV/LV ratio abnormal, greater than 1.0. LUNGS AND PLEURA: Diffuse lung mosaic attenuation, similar to previous which can be seen with pulmonary arterial hypertension. No right upper and middle lobe groundglass/consolidative infiltrates suggesting an acute pneumonia. No pleural effusion.  MEDIASTINUM/AXILLAE: No adenopathy.  CORONARY ARTERY CALCIFICATION: None.  UPPER ABDOMEN: Absent gallbladder  MUSCULOSKELETAL: Upper chest wall venous collaterals. Spinal degenerative change.      IMPRESSION: 1.  Small nonocclusive right upper lobe pulmonary embolism. 2.  Pulmonary arterial hypertension. 3.  Right heart dysfunction. 4.  New right lung infiltrates compatible with pneumonia.

## 2022-02-13 NOTE — PROGRESS NOTES
[facility] Infectious Disease  Afebrile  Oxygen needs improved  Continue current management    Alyssa Call M.D.

## 2022-02-14 ENCOUNTER — APPOINTMENT (OUTPATIENT)
Dept: PHYSICAL THERAPY | Facility: HOSPITAL | Age: 47
DRG: 871 | End: 2022-02-14
Payer: COMMERCIAL

## 2022-02-14 LAB
ANION GAP SERPL CALCULATED.3IONS-SCNC: 12 MMOL/L (ref 5–18)
BUN SERPL-MCNC: 15 MG/DL (ref 8–22)
CALCIUM SERPL-MCNC: 9.9 MG/DL (ref 8.5–10.5)
CHLORIDE BLD-SCNC: 98 MMOL/L (ref 98–107)
CO2 SERPL-SCNC: 28 MMOL/L (ref 22–31)
CREAT SERPL-MCNC: 0.74 MG/DL (ref 0.6–1.1)
ERYTHROCYTE [DISTWIDTH] IN BLOOD BY AUTOMATED COUNT: 14.8 % (ref 10–15)
GFR SERPL CREATININE-BSD FRML MDRD: >90 ML/MIN/1.73M2
GLUCOSE BLD-MCNC: 99 MG/DL (ref 70–125)
HCT VFR BLD AUTO: 46.4 % (ref 35–47)
HGB BLD-MCNC: 14.1 G/DL (ref 11.7–15.7)
HOLD SPECIMEN: NORMAL
HOLD SPECIMEN: NORMAL
MAGNESIUM SERPL-MCNC: 2.1 MG/DL (ref 1.8–2.6)
MCH RBC QN AUTO: 26.7 PG (ref 26.5–33)
MCHC RBC AUTO-ENTMCNC: 30.4 G/DL (ref 31.5–36.5)
MCV RBC AUTO: 88 FL (ref 78–100)
PLATELET # BLD AUTO: 274 10E3/UL (ref 150–450)
POTASSIUM BLD-SCNC: 4 MMOL/L (ref 3.5–5)
RBC # BLD AUTO: 5.29 10E6/UL (ref 3.8–5.2)
SODIUM SERPL-SCNC: 138 MMOL/L (ref 136–145)
WBC # BLD AUTO: 7.1 10E3/UL (ref 4–11)

## 2022-02-14 PROCEDURE — 97535 SELF CARE MNGMENT TRAINING: CPT | Mod: GP | Performed by: PHYSICAL THERAPIST

## 2022-02-14 PROCEDURE — 210N000001 HC R&B IMCU HEART CARE

## 2022-02-14 PROCEDURE — 99232 SBSQ HOSP IP/OBS MODERATE 35: CPT | Performed by: INTERNAL MEDICINE

## 2022-02-14 PROCEDURE — 250N000013 HC RX MED GY IP 250 OP 250 PS 637: Performed by: INTERNAL MEDICINE

## 2022-02-14 PROCEDURE — 36415 COLL VENOUS BLD VENIPUNCTURE: CPT | Performed by: INTERNAL MEDICINE

## 2022-02-14 PROCEDURE — 250N000011 HC RX IP 250 OP 636: Performed by: INTERNAL MEDICINE

## 2022-02-14 PROCEDURE — 85027 COMPLETE CBC AUTOMATED: CPT | Performed by: INTERNAL MEDICINE

## 2022-02-14 PROCEDURE — 99233 SBSQ HOSP IP/OBS HIGH 50: CPT | Performed by: INTERNAL MEDICINE

## 2022-02-14 PROCEDURE — 83735 ASSAY OF MAGNESIUM: CPT | Performed by: INTERNAL MEDICINE

## 2022-02-14 PROCEDURE — 82310 ASSAY OF CALCIUM: CPT | Performed by: INTERNAL MEDICINE

## 2022-02-14 PROCEDURE — 99207 PR CDG-CUT & PASTE-POTENTIAL IMPACT ON LEVEL: CPT | Performed by: INTERNAL MEDICINE

## 2022-02-14 PROCEDURE — 250N000013 HC RX MED GY IP 250 OP 250 PS 637: Performed by: FAMILY MEDICINE

## 2022-02-14 RX ORDER — CEFUROXIME AXETIL 500 MG/1
500 TABLET ORAL EVERY 12 HOURS SCHEDULED
Status: DISCONTINUED | OUTPATIENT
Start: 2022-02-15 | End: 2022-02-18 | Stop reason: HOSPADM

## 2022-02-14 RX ADMIN — RIVAROXABAN 15 MG: 15 TABLET, FILM COATED ORAL at 17:36

## 2022-02-14 RX ADMIN — SERTRALINE HYDROCHLORIDE 100 MG: 50 TABLET ORAL at 09:23

## 2022-02-14 RX ADMIN — RIVAROXABAN 15 MG: 15 TABLET, FILM COATED ORAL at 09:22

## 2022-02-14 RX ADMIN — GABAPENTIN 200 MG: 100 CAPSULE ORAL at 09:22

## 2022-02-14 RX ADMIN — FUROSEMIDE 40 MG: 10 INJECTION, SOLUTION INTRAVENOUS at 06:24

## 2022-02-14 RX ADMIN — GABAPENTIN 200 MG: 100 CAPSULE ORAL at 19:46

## 2022-02-14 RX ADMIN — CEFTRIAXONE SODIUM 2 G: 2 INJECTION, POWDER, FOR SOLUTION INTRAMUSCULAR; INTRAVENOUS at 13:54

## 2022-02-14 RX ADMIN — FUROSEMIDE 40 MG: 10 INJECTION, SOLUTION INTRAVENOUS at 19:46

## 2022-02-14 RX ADMIN — GABAPENTIN 200 MG: 100 CAPSULE ORAL at 13:52

## 2022-02-14 RX ADMIN — MICONAZOLE NITRATE: 20 POWDER TOPICAL at 09:22

## 2022-02-14 RX ADMIN — MICONAZOLE NITRATE: 20 POWDER TOPICAL at 19:50

## 2022-02-14 RX ADMIN — LISINOPRIL 10 MG: 5 TABLET ORAL at 09:24

## 2022-02-14 ASSESSMENT — ACTIVITIES OF DAILY LIVING (ADL)
ADLS_ACUITY_SCORE: 25
ADLS_ACUITY_SCORE: 27
ADLS_ACUITY_SCORE: 25
ADLS_ACUITY_SCORE: 27
ADLS_ACUITY_SCORE: 29
ADLS_ACUITY_SCORE: 27
ADLS_ACUITY_SCORE: 25
ADLS_ACUITY_SCORE: 27
ADLS_ACUITY_SCORE: 29
ADLS_ACUITY_SCORE: 27
ADLS_ACUITY_SCORE: 25
ADLS_ACUITY_SCORE: 27
ADLS_ACUITY_SCORE: 25
ADLS_ACUITY_SCORE: 25

## 2022-02-14 ASSESSMENT — MIFFLIN-ST. JEOR: SCORE: 2293.77

## 2022-02-14 NOTE — PLAN OF CARE
Problem: Impaired Wound Healing  Goal: Optimal Wound Healing  Outcome: No Change  Intervention: Promote Wound Healing  Recent Flowsheet Documentation  Taken 2/14/2022 0345 by Florina Ledesma RN  Activity Management:   activity adjusted per tolerance   activity encouraged  Taken 2/14/2022 0000 by Florina Ledesma RN  Activity Management:   activity adjusted per tolerance   activity encouraged     Patient is alert and oriented, able to make needs known.        Problem: Gas Exchange Impaired  Goal: Optimal Gas Exchange  Outcome: No Change  Intervention: Optimize Oxygenation and Ventilation  Recent Flowsheet Documentation  Taken 2/14/2022 0345 by Florina Ledesma, RN  Head of Bed (HOB) Positioning: HOB at 20-30 degrees  Taken 2/14/2022 0000 by Florina Ledesma RN  Head of Bed (HOB) Positioning: HOB at 20-30 degrees   Patient denied pain, CPAP on with supplemental oxygen bled into machine.  Due to desaturation to 88-89%, O2 needed to be increased to 5 L/min.  RT aware.    Powder applied to folds and pannus.  Pillow case changed behind left knee.  Interdry in place in pannus.  Cares required assistance of two staff members to perform.  Patient was encouraged to shift weight in bed.  Patient refused to have staff apply pillows to help her turn to side, though had pillows underneath arms and legs.  COVID precautions in place.  Will continue to monitor.

## 2022-02-14 NOTE — PROGRESS NOTES
HEART CARE NOTE          Assessment/Recommendations     1. HFpEF/RV dysfunction c/b ADHF  Assessment / Plan  Echo significant for RV dysfunction in the  of PE  Diuresing well on current regimen; no changes at time  Will need repeat echo to reassess RV function and RVSP in several months which may determine the need for VQ scan to assess for chromic PEs     2. Elevated troponin  Assessment / Plan  Mildly elevated in the setting of ADHF, COVID-19 infection and PE and likely associated RV strain; now trending down  Will hold off on further ischemic evaluation at this time; can consider stress testing once COVID-19 infection has been treated     3. COVID-19 infection  Assessment / Plan  Management primary team     Start: 9:30 am  Time:  9:45 am    History of Present Illness/Subjective      Ms. Bess Enamorado is a 46 year old female with a PMHx significant for with a recent COVID infection on 12/6/2021 who received monoclonal antibody, MS, chronic lymphedema, hypertension, depression, obstructive sleep apnea, PCOS, MS, admitted for fever, headache, increased leg swelling and cough found to have pneumonia, repeat positive Covid PCR, cellulitis, acute hypoxic respiratory failure and suspected CHF with a small PE.     Observed through doorway     ECG: Personally reviewed. sinus tachycardia.     ECHO:   1.Left ventricular size, wall motion and function are normal. The ejection  fraction is > 65%.  2.Moderately decreased right ventricular systolic function  3.No hemodynamically significant valvular abnormalities on 2D or color flow  imaging.  4.Technically difficult, suboptimal study due to patient size.  There is no comparison study available.     CT chest pulmonary:  IMPRESSION:  1.  Small nonocclusive right upper lobe pulmonary embolism.  2.  Pulmonary arterial hypertension.  3.  Right heart dysfunction.  4.  New right lung infiltrates compatible with pneumonia.          Physical Examination   /67 (BP Location: Left  "arm, Patient Position: Semi-[place]'s)   Pulse 81   Temp 98.6  F (37  C) (Axillary)   Resp 18   Ht 1.626 m (5' 4\")   Wt (!) 167.4 kg (369 lb)   LMP 01/31/2022   SpO2 91%   BMI 63.34 kg/m    Body mass index is 63.34 kg/m .  Wt Readings from Last 3 Encounters:   02/14/22 (!) 167.4 kg (369 lb)   05/25/21 (!) 181.4 kg (400 lb)   03/05/21 (!) 198 kg (436 lb 8.2 oz)     Given that patient is COVD-19 positive, physical exam and ROS have been deferred. Please refer to Dr. HA's excellent note for both.       Medical History  Surgical History Family History Social History   Past Medical History:   Diagnosis Date     Acute on chronic diastolic congestive heart failure (H) 2/9/2022     Arthritis 2019    Hips     ASCUS favor benign 8/2015    Neg HPV     Depressive disorder 2010     H/O colposcopy with cervical biopsy 9/14/15    Bx & ECC - negative     Hypertension 2019     LSIL on Pap smear 7/2014    Neg high risk HPV     Multiple sclerosis (H) 8/29/2005 9/18/200pt dx with MS 2004--dx by neurolgist and is followed by Dr. Steven Ayala 10/2016     UNSPEC CONSTIPATION 9/18/2006 9/18/2006   Has tried otc suppository and otc lax.     Past Surgical History:   Procedure Laterality Date     CHOLECYSTECTOMY, LAPOROSCOPIC      Cholecystectomy, Laparoscopic     COLONOSCOPY  2007?    Bathroom issue..results normal     OPEN REDUCTION INTERNAL FIXATION TOE(S)  4/13/2012    Procedure:OPEN REDUCTION INTERNAL FIXATION TOE(S); Open reduction internal fixation right proximal fifth metatarsal fracture-   Anes-choice block; Surgeon:LEY, JEFFREY DUANE; Location:[place] OR    no family history of premature coronary artery disease Social History     Socioeconomic History     Marital status: Single     Spouse name: Not on file     Number of children: Not on file     Years of education: Not on file     Highest education level: Not on file   Occupational History     Employer: [employer]   Tobacco Use     Smoking status: Never Smoker "     Smokeless tobacco: Never Used   Vaping Use     Vaping Use: Never used   Substance and Sexual Activity     Alcohol use: Yes     Comment: social     Drug use: No     Sexual activity: Not Currently     Partners: Male     Birth control/protection: Pill   Other Topics Concern     Parent/sibling w/ CABG, MI or angioplasty before 65F 55M? No   Social History Narrative    , 3rd-5th grade     Social Determinants of Health     Financial Resource Strain: Low Risk      Difficulty of Paying Living Expenses: Not hard at all   Food Insecurity: No Food Insecurity     Worried About Running Out of Food in the Last Year: Never true     Ran Out of Food in the Last Year: Never true   Transportation Needs: No Transportation Needs     Lack of Transportation (Medical): No     Lack of Transportation (Non-Medical): No   Physical Activity: Not on file   Stress: Not on file   Social Connections: Not on file   Intimate Partner Violence: Not At Risk     Fear of Current or Ex-Partner: No     Emotionally Abused: No     Physically Abused: No     Sexually Abused: No   Housing Stability: Not on file           Lab Results    Chemistry/lipid CBC Cardiac Enzymes/BNP/TSH/INR   Lab Results   Component Value Date    CHOL 162 02/11/2016    HDL 64 02/11/2016    TRIG 113 02/11/2016    BUN 15 02/13/2022     02/13/2022    CO2 33 (H) 02/13/2022    Lab Results   Component Value Date    WBC 7.1 02/14/2022    HGB 14.1 02/14/2022    HCT 46.4 02/14/2022    MCV 88 02/14/2022     02/14/2022    Lab Results   Component Value Date    TROPONINI 0.33 (HH) 02/09/2022    BNP 2,230 (H) 02/08/2022    TSH 2.81 01/09/2012    INR 1.23 (H) 02/11/2022     Lab Results   Component Value Date    TROPONINI 0.33 (HH) 02/09/2022          Weight:    Wt Readings from Last 3 Encounters:   02/14/22 (!) 167.4 kg (369 lb)   05/25/21 (!) 181.4 kg (400 lb)   03/05/21 (!) 198 kg (436 lb 8.2 oz)       Allergies  Allergies   Allergen Reactions     Nkda [No  Known Drug Allergies]          Surgical History  Past Surgical History:   Procedure Laterality Date     CHOLECYSTECTOMY, LAPOROSCOPIC      Cholecystectomy, Laparoscopic     COLONOSCOPY  2007?    Bathroom issue..results normal     OPEN REDUCTION INTERNAL FIXATION TOE(S)  4/13/2012    Procedure:OPEN REDUCTION INTERNAL FIXATION TOE(S); Open reduction internal fixation right proximal fifth metatarsal fracture-   Anes-choice block; Surgeon:LEY, JEFFREY DUANE; Location:[place] OR       Social History  Tobacco:   History   Smoking Status     Never Smoker   Smokeless Tobacco     Never Used    Alcohol:   Social History    Substance and Sexual Activity      Alcohol use: Yes        Comment: social   Illicit Drugs:   History   Drug Use No       Family History  Family History   Problem Relation Age of Onset     Neurologic Disorder Father         MS     Heart Disease Father         irregular heart rate     Diabetes Father      Melanoma Father      Sleep Apnea Father      Other Cancer Father      Cancer Maternal Grandfather         lung     Other Cancer Maternal Grandfather      Cancer Paternal Grandmother         stomach     Other Cancer Paternal Grandmother      Cancer Mother      Other Cancer Mother      Thyroid Disease Mother      Gynecology Maternal Aunt         PCOS     Hypertension Maternal Grandmother      Anxiety Disorder Maternal Grandmother      Thyroid Disease Maternal Grandmother      Anxiety Disorder Sister           Raúl Sanchez MD on 2/14/2022      cc: Mikala Luna

## 2022-02-14 NOTE — PROGRESS NOTES
INFECTIOUS DISEASE FOLLOW UP NOTE  phone visit    ASSESSMENT:  1. Presented with fever. Most likely due to left lower extremity cellulitis. Other possibilities that this is COVID, less likely bacterial pneumonia. Improved.   2.  COVID positive. Possibilities are current infection vs residual mRNA remnants from infection in December. She had 2 vaccinations mid 2021. Certainly can see recurrent infection, and in December she would have likely had Delta variant, whereas now it would be Omicron, and the protection from previous infection and vaccine are not as good against Omicron. She is hypoxic, but lung imaging has not shown typical sign of COVID pneumonia. With acute onset fever 2/7 pm, it would be early for COVID pneumonia on presentation. CRP elevation -- not clear if this is from COVID or other process -- cellulitis. CRP improved without steroid.  Oxygenation improved with diuresis. Overall currently low suspicion for COVID contributing significantly to oxygenation. 3 days remdesivir completed.  3. Pulmonary embolism. Right heart strain. Positive troponin.  4. CHF. Getting diuresis. BNP In the 2000s  5. MS>>has not been on rx  6. Left lower extremity cellulitis -- most suspicious for beta-hemolytic strep.   7. Positive blood cultures -- gram positive rods and coag neg staph are contaminants, not true bacteremia.   8. CT chest with consolidation, hi PCT and wbc on admission suggest bacterial       PLAN:  Ceftriaxone today, then plan oral cefuroxime, last day 2/21/22  Monitor O2, wean as tolerated.  Isolation 10 days for positive COVID test 2/8/22.   Please call us if further questions. I will sign off.     Frank Alexis MD  [facility]  [phone] [facility] paging  ______________________________________________________________________       SUBJECTIVE / INTERVAL HISTORY: feels better overall. Legs better. On face mask, avoiding nasal cannula due to epistaxis, improved for now.  "    Productive cough  No chest pain  Preserved senses of smell and taste    MS not on rx    Her niece had home covid test, this was negative.     ROS: deconditioned. All other systems negative except as listed above.        OBJECTIVE:  /75 (BP Location: Left arm, Patient Position: Supine)   Pulse 86   Temp 97.7  F (36.5  C) (Oral)   Resp 18   Ht 1.626 m (5' 4\")   Wt (!) 167.4 kg (369 lb)   LMP 2022   SpO2 98%   BMI 63.34 kg/m    FiO2 (%): 45 %    Vital Signs  Temp: 98.2  F (36.8  C)  Temp src: Axillary  Resp: 22  Pulse: 84  Pulse Rate Source: Monitor  BP: 109/65  BP Location: Left arm    No distress. I turned O2 down to 2L for a while when I was in room -- sats mid 90s, she dropped to 89% so I turned back up to 3L.   No dysarthria  Alert and oriented  No rash. Wound lateral L ankle looks the same.  RRR  Abdomen not tender  Legs with severe lymphedema, less tender compared to admission but  lower legs.         Antibiotics:  Ceftriaxone -  pip/tazo   Vancomycin 2/8-10    Pertinent labs:    Recent Labs   Lab 22  0510 22  0502 02/10/22  0533   WBC 7.1 5.9 7.0   HGB 14.1 12.6 11.8   HCT 46.4 42.3 39.2    219 213        Recent Labs   Lab 22  0510 22  0555 22  0454    139 140   CO2 28 33* 31   BUN 15 15 13        Lab Results   Component Value Date    CRP 8.5 (H) 2022    CRP 13.1 (H) 2022    CRP 20.0 (H) 02/10/2022         Lab Results   Component Value Date    ALT 12 2022    AST 18 2022    ALKPHOS 40 (L) 2022         MICROBIOLOGY DATA:  Blood cultures  -- both sets with corynebacterium striatum, one set coag neg staph      RADIOLOGY:  Echocardiogram Complete    Result Date: 2022  [id number]^TIANA^JORJE^[place] [address]  Name: CITLALY MICHEL MRN: [id number] : 1975 Study Date: 2022 09:28 AM Age: 46 yrs Gender: Female Patient " Location: [facility] Reason For Study: CHF Ordering Physician: JORJE MONTIEL Performed By: BEN  BSA: 2.8 m2 Height: 64 in Weight: 480 lb HR: 97 ______________________________________________________________________________ Procedure Complete Portable Echo Adult. Definity (NDC #15826-955) given intravenously. No hemodynamically significant valvular abnormalities on 2D or color flow imaging. Technically difficult, suboptimal study. There is no comparison study available. ______________________________________________________________________________ Interpretation Summary  1.Left ventricular size, wall motion and function are normal. The ejection fraction is > 65%. 2.Moderately decreased right ventricular systolic function 3.No hemodynamically significant valvular abnormalities on 2D or color flow imaging. 4.Technically difficult, suboptimal study due to patient size. There is no comparison study available. ______________________________________________________________________________ I      WMSI = 1.00     % Normal = 100  X - Cannot   0 -                      (2) - Mildly 2 -          Segments  Size Interpret    Hyperkinetic 1 - Normal  Hypokinetic  Hypokinetic  1-2     small                                                    7 -          3-5    moderate 3 - Akinetic 4 -          5 -         6 - Akinetic Dyskinetic   6-14    large              Dyskinetic   Aneurysmal  w/scar       w/scar       15-16   diffuse  Left Ventricle Left ventricular size, wall motion and function are normal. The ejection fraction is > 65%. There is normal left ventricular wall thickness. Left ventricular diastolic function is normal. No regional wall motion abnormalities noted.  Right Ventricle The right ventricle is mildly dilated. Moderately decreased right ventricular systolic function. The right ventricle is not well visualized.  Atria The left atrium is not well visualized. The left atrium is mildly dilated. Right atrial size is normal.  There is no color Doppler evidence of an atrial shunt.  Mitral Valve The mitral valve is not well visualized. There is no mitral regurgitation noted. There is no mitral valve stenosis.  Tricuspid Valve The tricuspid valve is not well visualized, but is grossly normal. The tricuspid valve is not well visualized. Right ventricle systolic pressure estimate normal. There is physiologic tricuspid regurgitation. There is no tricuspid stenosis.  Aortic Valve Aortic valve leaflets appear normal. There is no evidence of aortic stenosis or clinically significant aortic regurgitation. The aortic valve is not well visualized. No aortic regurgitation is present. No aortic stenosis is present.  Pulmonic Valve The pulmonic valve is not well seen, but is grossly normal. The pulmonic valve is not well visualized. This degree of valvular regurgitation is within normal limits. There is no pulmonic valvular stenosis.  Vessels The aorta root is normal. Normal size ascending aorta. IVC diameter <2.1 cm collapsing >50% with sniff suggests a normal RA pressure of 3 mmHg.  Pericardium There is no pericardial effusion.  Rhythm Sinus rhythm was noted.  ______________________________________________________________________________ MMode/2D Measurements & Calculations Ao root diam: 3.1 cm asc Aorta Diam: 3.6 cm  LVOT diam: 2.4 cm LVOT area: 4.7 cm2  Doppler Measurements & Calculations MV E max jim: 96.7 cm/sec MV A max jim: 121.8 cm/sec MV E/A: 0.79 MV dec slope: 538.6 cm/sec2 MV dec time: 0.18 sec Ao V2 max: 138.1 cm/sec Ao max P.0 mmHg Ao V2 mean: 87.3 cm/sec Ao mean PG: 3.7 mmHg Ao V2 VTI: 25.2 cm SHANELL(I,D): 4.1 cm2 SHANELL(V,D): 4.4 cm2 LV V1 max P.8 mmHg LV V1 max: 130.4 cm/sec LV V1 VTI: 22.0 cm SV(LVOT): 103.6 ml SI(LVOT): 36.5 ml/m2 AV Jim Ratio (DI): 0.94 SHANELL Index (cm2/m2): 1.4 E/E' av.8 Lateral E/e': 18.5 Medial E/e': 15.1  ______________________________________________________________________________ Report approved by: Les  Lian Llanes 02/09/2022 10:51 AM       US Lower Extremity Venous Duplex Bilateral    Result Date: 2/8/2022  EXAM: US LOWER EXTREMITY VENOUS DUPLEX BILATERAL LOCATION: [facility] DATE/TIME: 2/8/2022 5:04 PM INDICATION: PE on CT, concern for DVT. COMPARISON: None. TECHNIQUE: Venous duplex ultrasound of bilateral lower extremities with and without compression, augmentation and duplex. Color flow and spectral Doppler with waveform analysis performed. FINDINGS: Exam includes the common femoral, femoral, popliteal veins as well as segmentally visualized deep calf veins and greater saphenous vein. RIGHT: No deep vein thrombosis. No superficial thrombophlebitis. No popliteal cyst. LEFT: No deep vein thrombosis. No superficial thrombophlebitis. No popliteal cyst.     IMPRESSION: No deep venous thrombosis in the bilateral lower extremities. Visualization of the veins is technically limited by body habitus.    CT Chest Pulmonary Embolism w Contrast    Result Date: 2/8/2022  EXAM: CT CHEST PULMONARY EMBOLISM W CONTRAST LOCATION: [facility] DATE/TIME: 2/8/2022 3:03 PM INDICATION: COVID. Dyspnea. Fever. Weakness. COMPARISON: 03/05/2021 CT TECHNIQUE: CT chest pulmonary angiogram during arterial phase injection of IV contrast. Multiplanar reformats and MIP reconstructions were performed. Dose reduction techniques were used. CONTRAST: isovue 370 100ml FINDINGS: ANGIOGRAM CHEST: Enlarged main pulmonary artery is 4 cm diameter consistent with pulmonary arterial hypertension. Small nonocclusive thrombus right upper lobe pulmonary artery (series 5, image 66). No additional thrombi seen. Thoracic aorta is negative for dissection. RV/LV ratio abnormal, greater than 1.0. LUNGS AND PLEURA: Diffuse lung mosaic attenuation, similar to previous which can be seen with pulmonary arterial hypertension. No right upper and middle lobe groundglass/consolidative infiltrates suggesting an acute  pneumonia. No pleural effusion. MEDIASTINUM/AXILLAE: No adenopathy. CORONARY ARTERY CALCIFICATION: None. UPPER ABDOMEN: Absent gallbladder MUSCULOSKELETAL: Upper chest wall venous collaterals. Spinal degenerative change.     IMPRESSION: 1.  Small nonocclusive right upper lobe pulmonary embolism. 2.  Pulmonary arterial hypertension. 3.  Right heart dysfunction. 4.  New right lung infiltrates compatible with pneumonia. 5.  Report called to Dr. Guillen at 3:30 PM.    CT Head w/o Contrast    Result Date: 2/9/2022  EXAM: CT HEAD W/O CONTRAST LOCATION: [facility] DATE/TIME: 2/9/2022 10:32 PM INDICATION: Fall with headache. COMPARISON: None. TECHNIQUE: Routine CT Head without IV contrast. Multiplanar reformats. Dose reduction techniques were used. FINDINGS: INTRACRANIAL CONTENTS: No intracranial hemorrhage, extraaxial collection, or mass effect.  No CT evidence of acute infarct. There are a few tiny foci of low-attenuations primarily posterior to the lateral ventricles which most likely represent prominent perivascular spaces. Normal ventricles and sulci. VISUALIZED ORBITS/SINUSES/MASTOIDS: No intraorbital abnormality. No paranasal sinus mucosal disease. No middle ear or mastoid effusion. BONES/SOFT TISSUES: No acute abnormality.     IMPRESSION: 1.  No CT finding of a mass, hemorrhage or focal area suggestive of acute infarct

## 2022-02-14 NOTE — PROGRESS NOTES
Pt placed on home cpap overnight for rest.  Initially 2 lpm bleed was applied,  overnight O2 was increased to 5 lpm due to low SPO2 of 88%.  SPO2 increased to 90-93% with 5 lpm bleed into home cpap unit.  RT will continue to monitor.

## 2022-02-14 NOTE — PLAN OF CARE
Problem: Infection Progression (Sepsis)  Goal: Absence of Infection Signs and Symptoms  Outcome: Improving  Intervention: Initiate Sepsis Management  Recent Flowsheet Documentation  Taken 2/14/2022 1652 by Doyle Hernandez RN  Infection Prevention: hand hygiene promoted  Isolation Precautions: airborne precautions maintained  Taken 2/14/2022 1230 by Doyle Hernandez RN  Infection Prevention: hand hygiene promoted  Isolation Precautions: airborne precautions maintained  Taken 2/14/2022 0921 by Doyle Hernandez RN  Infection Prevention: hand hygiene promoted  Isolation Precautions: airborne precautions maintained     Problem: Gas Exchange Impaired  Goal: Optimal Gas Exchange  Outcome: Improving       Pt was weaned to 1.5 L nasal canula.  Pt lungs are clear diminished.   Pt had lymphedema wraps placed by rehab. MD gave verbal ok to wrap.   Pt denies pain.    Pt has jiang that is draining well.      Pt has open wounds under abdominal folds, and behind knees.    Inner dry and miconaxole powder were applied.    Writer requested WOC consult.

## 2022-02-14 NOTE — PROGRESS NOTES
Hospitalist Progress Note  ADMIT DATE: 2/8/2022     FACILITY: [facility]    PCP: Mikala Luna, [phone]    Assessment/Plan    Bess Enamorado is a 46 year old female with a recent COVID infection on 12/2021 who received monoclonal antibody, MS, chronic lymphedema, hypertension, depression, NARCISA, PCOS, MS who admitted for fever, headache, increased leg swelling and cough found to have pneumonia, repeat positive Covid PCR, cellulitis of lower extremity, acute hypoxic respiratory failure, CHF exacerbation, CT chest with PE.     Acute hypoxic respiratory failure; likely multifactorial etiology-CHF exacerbation, pulmonary embolism, possible bacterial pneumonia, COVID-19 pneumonia, physical deconditioning.    History of NARCISA on home CPAP  --Patient required BiPAP on admission.   --On 2/11, discontinued nighttime BiPAP, resumed home CPAP, tolerating  --On 2/12, oxygen titrated to 2 L.  --Incentive spirometry, flutter valve.  --Treat underlying cause as mentioned below  --Appreciate pulmonary input.     Acute heart failure with preserved EF;  --On admission BNP 2230 and clinically looked volume overload  --Echo reported EF 65%.  Moderately decreased right ventricular systolic function.    --Lasix drip with good response (16L negative balance), now transitioned to IV Lasix push on 2/12, cardiology managing diuretics  --Monitor intake/output, weight, labs closely.  --Appreciated cardiology input.      Elevated troponin; likely demand ischemia in the setting of acute diastolic heart failure, pulmonary embolism, COVID-19  --Echo result mentioned above.  Troponin trending down.    --Cardiologist recommended no further ischemic evaluation at this time, can consider stress testing later and defer timing to cardiology team.     Acute right upper lobe pulmonary embolism;  --CT chest also reported pulmonary artery hypertension and Right heart dysfunction.  --Echo as mentioned above.  --Bilateral lower  extremity US negative for DVT.  --Heparin drip transition to Xarelto, continue.  --Appreciated pulmonary input, recommended 3 months of anticoagulation and repeat TTE in 3 months to reeval RV function.  Follow-up with pulmonologist as an outpatient.     Positive COVID-19 test on 12/11/2021 and 2/8/22;  Sepsis likely due to left lower extremity cellulitis;  Positive blood culture; likely contamination  --On admission, CT chest reported new right lung infiltrate compatible with pneumonia.  --On admission, elevated procalcitonin and elevated WBC count.  Normal lactic acid.  --Completed 3 days course of remdesivir per ID recommendation.  --Appreciated ID input, reported fever likely due to LLE cellulitis and less likely bacterial pneumonia.  --On 2/10, IV Vanco discontinued.   --On 2/11 IV Zosyn changed to IV Rocephin.  Appreciated ID input.  --Continue clinical monitoring, lab monitoring  --Wound care nurse on case     Chronic lower extremity lymphedema;  --Initiated lymphedema wrap, continue     Essential hypertension; controlled  --Continue home metoprolol, lisinopril.  Hydralazine as needed     History of MS;  No longer taking medication.  Primarily has left lower extremity weakness and pain.    --PT OT.      Morbid obesity;  Body mass index is 64.51 kg/m .  --Weight reduction program, defer to primary as an outpatient.     Disposition; pending; TCU most likely  Barrier to discharge; multiple medical issues, refer above        Subjective  Feels better, denies cp/sob, no f/c, no n/v;     Objective    Vital signs in last 24 hours  Temp:  [97.7  F (36.5  C)-98.9  F (37.2  C)] 98.6  F (37  C)  Pulse:  [81-90] 81  Resp:  [18-20] 18  BP: (107-117)/(59-72) 107/72  SpO2:  [88 %-96 %] 91 % [unfilled] O2 Device: BiPAP/CPAP    Weight:   [unfilled] Weight change: 0.181 kg (6.4 oz)    Intake/Output last 3 shifts  I/O last 3 completed shifts:  In: 820 [P.O.:700; I.V.:120]  Out: 3250 [Urine:3250]  Body mass index is 63.34  kg/m .    Physical Exam    Physical Exam  Vitals and nursing note reviewed.   Constitutional:       General: She is not in acute distress.     Appearance: She is obese. She is not ill-appearing or toxic-appearing.   HENT:      Head: Normocephalic and atraumatic.      Right Ear: External ear normal.      Left Ear: External ear normal.      Nose: Nose normal.      Mouth/Throat:      Mouth: Mucous membranes are moist.   Eyes:      General: No scleral icterus.        Right eye: No discharge.         Left eye: No discharge.      Extraocular Movements: Extraocular movements intact.      Pupils: Pupils are equal, round, and reactive to light.   Cardiovascular:      Rate and Rhythm: Normal rate and regular rhythm.      Pulses: Normal pulses.      Heart sounds: No murmur heard.      Pulmonary:      Effort: Pulmonary effort is normal. No respiratory distress.      Comments: On 2lpm, face mask  Abdominal:      General: There is distension.      Tenderness: There is no abdominal tenderness. There is no guarding.   Musculoskeletal:         General: Swelling present.      Cervical back: Normal range of motion and neck supple. No rigidity.      Right lower leg: Edema present.      Left lower leg: Edema present.   Skin:     General: Skin is warm and dry.      Coloration: Skin is not jaundiced.      Findings: Erythema present.   Neurological:      Mental Status: She is alert. Mental status is at baseline.   Psychiatric:         Mood and Affect: Mood normal.         Pertinent Labs   Lab Results: personally reviewed.   Results for CITLALY MICHEL (MRN [id number]) as of 2/14/2022 08:06   Ref. Range 2/14/2022 05:10   Sodium Latest Ref Range: 136 - 145 mmol/L 138   Potassium Latest Ref Range: 3.5 - 5.0 mmol/L 4.0   Chloride Latest Ref Range: 98 - 107 mmol/L 98   Carbon Dioxide Latest Ref Range: 22 - 31 mmol/L 28   Urea Nitrogen Latest Ref Range: 8 - 22 mg/dL 15   Creatinine Latest Ref Range: 0.60 - 1.10 mg/dL 0.74   GFR Estimate Latest Ref  Range: >60 mL/min/1.73m2 >90   Calcium Latest Ref Range: 8.5 - 10.5 mg/dL 9.9   Anion Gap Latest Ref Range: 5 - 18 mmol/L 12   Magnesium Latest Ref Range: 1.8 - 2.6 mg/dL 2.1   Glucose Latest Ref Range: 70 - 125 mg/dL 99   WBC Latest Ref Range: 4.0 - 11.0 10e3/uL 7.1   Hemoglobin Latest Ref Range: 11.7 - 15.7 g/dL 14.1   Hematocrit Latest Ref Range: 35.0 - 47.0 % 46.4   Platelet Count Latest Ref Range: 150 - 450 10e3/uL 274   RBC Count Latest Ref Range: 3.80 - 5.20 10e6/uL 5.29 (H)   MCV Latest Ref Range: 78 - 100 fL 88   MCH Latest Ref Range: 26.5 - 33.0 pg 26.7   MCHC Latest Ref Range: 31.5 - 36.5 g/dL 30.4 (L)   RDW Latest Ref Range: 10.0 - 15.0 % 14.8       Medications  Scheduled Meds:    cefTRIAXone  2 g Intravenous Q24H     furosemide  40 mg Intravenous Q12H     gabapentin  200 mg Oral TID     lisinopril  10 mg Oral Daily     metoprolol succinate ER  25 mg Oral Daily     miconazole   Topical BID     [START ON 3/4/2022] rivaroxaban ANTICOAGULANT  20 mg Oral Daily with supper    Followed by     rivaroxaban ANTICOAGULANT  15 mg Oral BID w/meals     sertraline  100 mg Oral Daily     sodium chloride (PF)  3 mL Intravenous Q8H     Continuous Infusions:    - MEDICATION INSTRUCTIONS -       PRN Meds:.acetaminophen **OR** acetaminophen, lidocaine 4%, lidocaine (buffered or not buffered), loperamide, melatonin, ondansetron **OR** ondansetron, oxymetazoline, - MEDICATION INSTRUCTIONS -, senna-docusate **OR** senna-docusate, sodium chloride, sodium chloride (PF), sodium chloride (PF)      Advanced Care Planning:  Discharge planning discussed with patient         [facility]  Eliazar Vargas

## 2022-02-14 NOTE — PLAN OF CARE
"  Problem: Nutrition Impaired (Sepsis/Septic Shock)  Goal: Optimal Nutrition Intake  2/13/2022 2203 by Rossy Breen, RN  Outcome: Improving  2/13/2022 2156 by Rossy Breen, RN  Outcome: Improving     Problem: Impaired Wound Healing  Goal: Optimal Wound Healing  Outcome: Improving  Intervention: Promote Wound Healing    Patient is compliant with cares. Currently on 2 L oxymask satting at 94%+. Brar patent. Cleaned, dried, applied miconazole powder to under breasts, Pannus and behind knee's. Also applied new interdry to pannus, new pillow case behind left knee. PT took off lymph wraps due to, \"toes seem more red.\"     Bilateral lower extremities: +3 edema  Ankles and feet: +2 edema    Protocol:  K+: 3.6, am recheck,, 2/14.  Magnesium: 2.1, am recheck, 2/14.    Telemetry reads Normal Sinus Rhythm with Prolonged QT.    "

## 2022-02-15 ENCOUNTER — APPOINTMENT (OUTPATIENT)
Dept: OCCUPATIONAL THERAPY | Facility: HOSPITAL | Age: 47
DRG: 871 | End: 2022-02-15
Payer: COMMERCIAL

## 2022-02-15 ENCOUNTER — APPOINTMENT (OUTPATIENT)
Dept: PHYSICAL THERAPY | Facility: HOSPITAL | Age: 47
DRG: 871 | End: 2022-02-15
Payer: COMMERCIAL

## 2022-02-15 LAB
HOLD SPECIMEN: NORMAL
HOLD SPECIMEN: NORMAL
MAGNESIUM SERPL-MCNC: 1.9 MG/DL (ref 1.8–2.6)
POTASSIUM BLD-SCNC: 4.1 MMOL/L (ref 3.5–5)
RIVAROXABAN PPP CHRO-MCNC: 379 NG/ML

## 2022-02-15 PROCEDURE — 250N000011 HC RX IP 250 OP 636: Performed by: INTERNAL MEDICINE

## 2022-02-15 PROCEDURE — 99232 SBSQ HOSP IP/OBS MODERATE 35: CPT | Performed by: INTERNAL MEDICINE

## 2022-02-15 PROCEDURE — 97530 THERAPEUTIC ACTIVITIES: CPT | Mod: GP

## 2022-02-15 PROCEDURE — 250N000013 HC RX MED GY IP 250 OP 250 PS 637: Performed by: FAMILY MEDICINE

## 2022-02-15 PROCEDURE — 80299 QUANTITATIVE ASSAY DRUG: CPT | Performed by: INTERNAL MEDICINE

## 2022-02-15 PROCEDURE — 36415 COLL VENOUS BLD VENIPUNCTURE: CPT | Performed by: INTERNAL MEDICINE

## 2022-02-15 PROCEDURE — 83735 ASSAY OF MAGNESIUM: CPT | Performed by: INTERNAL MEDICINE

## 2022-02-15 PROCEDURE — 97110 THERAPEUTIC EXERCISES: CPT | Mod: GO

## 2022-02-15 PROCEDURE — 210N000001 HC R&B IMCU HEART CARE

## 2022-02-15 PROCEDURE — 97116 GAIT TRAINING THERAPY: CPT | Mod: GP

## 2022-02-15 PROCEDURE — 250N000013 HC RX MED GY IP 250 OP 250 PS 637: Performed by: INTERNAL MEDICINE

## 2022-02-15 PROCEDURE — 97530 THERAPEUTIC ACTIVITIES: CPT | Mod: GO

## 2022-02-15 PROCEDURE — 84132 ASSAY OF SERUM POTASSIUM: CPT | Performed by: INTERNAL MEDICINE

## 2022-02-15 PROCEDURE — 97535 SELF CARE MNGMENT TRAINING: CPT | Mod: GO

## 2022-02-15 RX ORDER — MAGNESIUM SULFATE HEPTAHYDRATE 40 MG/ML
2 INJECTION, SOLUTION INTRAVENOUS ONCE
Status: COMPLETED | OUTPATIENT
Start: 2022-02-15 | End: 2022-02-15

## 2022-02-15 RX ADMIN — MICONAZOLE NITRATE: 20 POWDER TOPICAL at 19:08

## 2022-02-15 RX ADMIN — CEFUROXIME AXETIL 500 MG: 500 TABLET ORAL at 08:38

## 2022-02-15 RX ADMIN — SERTRALINE HYDROCHLORIDE 100 MG: 50 TABLET ORAL at 08:38

## 2022-02-15 RX ADMIN — MICONAZOLE NITRATE: 20 POWDER TOPICAL at 08:39

## 2022-02-15 RX ADMIN — CEFUROXIME AXETIL 500 MG: 500 TABLET ORAL at 19:08

## 2022-02-15 RX ADMIN — RIVAROXABAN 15 MG: 15 TABLET, FILM COATED ORAL at 08:38

## 2022-02-15 RX ADMIN — RIVAROXABAN 15 MG: 15 TABLET, FILM COATED ORAL at 17:55

## 2022-02-15 RX ADMIN — GABAPENTIN 200 MG: 100 CAPSULE ORAL at 08:38

## 2022-02-15 RX ADMIN — MAGNESIUM SULFATE HEPTAHYDRATE 2 G: 40 INJECTION, SOLUTION INTRAVENOUS at 08:38

## 2022-02-15 RX ADMIN — GABAPENTIN 200 MG: 100 CAPSULE ORAL at 13:08

## 2022-02-15 RX ADMIN — FUROSEMIDE 40 MG: 10 INJECTION, SOLUTION INTRAVENOUS at 08:39

## 2022-02-15 RX ADMIN — FUROSEMIDE 40 MG: 10 INJECTION, SOLUTION INTRAVENOUS at 18:01

## 2022-02-15 RX ADMIN — METOPROLOL SUCCINATE 25 MG: 25 TABLET, EXTENDED RELEASE ORAL at 08:38

## 2022-02-15 RX ADMIN — GABAPENTIN 200 MG: 100 CAPSULE ORAL at 19:08

## 2022-02-15 ASSESSMENT — ACTIVITIES OF DAILY LIVING (ADL)
ADLS_ACUITY_SCORE: 29
ADLS_ACUITY_SCORE: 29
ADLS_ACUITY_SCORE: 27
ADLS_ACUITY_SCORE: 29
ADLS_ACUITY_SCORE: 27
ADLS_ACUITY_SCORE: 29
ADLS_ACUITY_SCORE: 27
ADLS_ACUITY_SCORE: 29
ADLS_ACUITY_SCORE: 27
ADLS_ACUITY_SCORE: 27
ADLS_ACUITY_SCORE: 29
ADLS_ACUITY_SCORE: 27
ADLS_ACUITY_SCORE: 29
ADLS_ACUITY_SCORE: 29

## 2022-02-15 ASSESSMENT — MIFFLIN-ST. JEOR: SCORE: 2263.84

## 2022-02-15 NOTE — PROGRESS NOTES
Care Management Follow Up    Length of Stay (days): 7    Expected Discharge Date: 02/17/2022     Concerns to be Addressed:     Discharge planning  Patient plan of care discussed at interdisciplinary rounds: Yes    Anticipated Discharge Disposition: Home Care     Anticipated Discharge Services:  RN PT and OT  Anticipated Discharge DME: Wheelchair,Walker,Shower Chair  Education Provided on the Discharge Plan:  yes  Patient/Family in Agreement with the Plan: yes    Referrals Placed by CM/SW:  Tika Boss  Private pay costs discussed: Not applicable    Additional Information:  Chart reviewed. Patient lives home alone, one level home. Patient states she plans to return home at discharge, has been to a TCU in the past and states this time is different and she feels she can discharge home.   Patient is open to [facility] RN home services for wound care.   Patient states she is on Social Security Disability and qualifies for PCA services, she has had a college student as her PCA in the past and plans to have her sister be her PCA in the immediate future. Pt states she has received notification from [facility] for re certification of PCA hours. Pt has a walker, wheelchair, shower chair and grab bars at home.   Care management will follow for discharge planning and progression of care.     Alicia Meyer, CHANCESW

## 2022-02-15 NOTE — PROGRESS NOTES
HEART CARE NOTE          Assessment/Recommendations     1. HFpEF/RV dysfunction c/b ADHF  Assessment / Plan  Echo significant for RV dysfunction in the  of PE  Diuresing well on current regimen; no changes at time  Will need repeat echo to reassess RV function and RVSP in several months which may determine the need for VQ scan to assess for chromic PEs     2. Elevated troponin  Assessment / Plan  Mildly elevated in the setting of ADHF, COVID-19 infection and PE and likely associated RV strain; now trending down  Will hold off on further ischemic evaluation at this time as patient is chest pain free; can consider stress testing once COVID-19 infection has been treated     3. COVID-19 infection  Assessment / Plan  Management primary team    Start Time: 9:00 am  End Time: 9:15 am    History of Present Illness/Subjective      Ms. Bess Enamorado is a 46 year old female with a PMHx significant for with a recent COVID infection on 12/6/2021 who received monoclonal antibody, MS, chronic lymphedema, hypertension, depression, obstructive sleep apnea, PCOS, MS, admitted for fever, headache, increased leg swelling and cough found to have pneumonia, repeat positive Covid PCR, cellulitis, acute hypoxic respiratory failure and suspected CHF with a small PE.     Observed through doorway     ECG: Personally reviewed. sinus tachycardia.     ECHO:   1.Left ventricular size, wall motion and function are normal. The ejection  fraction is > 65%.  2.Moderately decreased right ventricular systolic function  3.No hemodynamically significant valvular abnormalities on 2D or color flow  imaging.  4.Technically difficult, suboptimal study due to patient size.  There is no comparison study available.     CT chest pulmonary:  IMPRESSION:  1.  Small nonocclusive right upper lobe pulmonary embolism.  2.  Pulmonary arterial hypertension.  3.  Right heart dysfunction.  4.  New right lung infiltrates compatible with pneumonia.        Physical  "Examination   /68 (BP Location: Left arm, Patient Position: Semi-Stockton's)   Pulse 94   Temp 98.9  F (37.2  C) (Axillary)   Resp 20   Ht 1.626 m (5' 4\")   Wt (!) 164.4 kg (362 lb 6.4 oz)   LMP 01/31/2022   SpO2 92%   BMI 62.21 kg/m    Body mass index is 62.21 kg/m .  Wt Readings from Last 3 Encounters:   02/15/22 (!) 164.4 kg (362 lb 6.4 oz)   05/25/21 (!) 181.4 kg (400 lb)   03/05/21 (!) 198 kg (436 lb 8.2 oz)     Given that patient is COVD-19 positive, physical exam and ROS have been deferred. Please refer to Dr. Fernando's excellent note for both.         Medical History  Surgical History Family History Social History   Past Medical History:   Diagnosis Date     Acute on chronic diastolic congestive heart failure (H) 2/9/2022     Arthritis 2019    Hips     ASCUS favor benign 8/2015    Neg HPV     Depressive disorder 2010     H/O colposcopy with cervical biopsy 9/14/15    Bx & ECC - negative     Hypertension 2019     LSIL on Pap smear 7/2014    Neg high risk HPV     Multiple sclerosis (H) 8/29/2005 9/18/200pt dx with MS 2004--dx by neurolgist and is followed by Dr. Steven Ayala 10/2016     UNSPEC CONSTIPATION 9/18/2006 9/18/2006   Has tried otc suppository and otc lax.     Past Surgical History:   Procedure Laterality Date     CHOLECYSTECTOMY, LAPOROSCOPIC      Cholecystectomy, Laparoscopic     COLONOSCOPY  2007?    Bathroom issue..results normal     OPEN REDUCTION INTERNAL FIXATION TOE(S)  4/13/2012    Procedure:OPEN REDUCTION INTERNAL FIXATION TOE(S); Open reduction internal fixation right proximal fifth metatarsal fracture-   Anes-choice block; Surgeon:LEY, JEFFREY DUANE; Location:[place] OR    no family history of premature coronary artery disease Social History     Socioeconomic History     Marital status: Single     Spouse name: Not on file     Number of children: Not on file     Years of education: Not on file     Highest education level: Not on file   Occupational History     Employer: " [employer]   Tobacco Use     Smoking status: Never Smoker     Smokeless tobacco: Never Used   Vaping Use     Vaping Use: Never used   Substance and Sexual Activity     Alcohol use: Yes     Comment: social     Drug use: No     Sexual activity: Not Currently     Partners: Male     Birth control/protection: Pill   Other Topics Concern     Parent/sibling w/ CABG, MI or angioplasty before 65F 55M? No   Social History Narrative    , 3rd-5th grade     Social Determinants of Health     Financial Resource Strain: Low Risk      Difficulty of Paying Living Expenses: Not hard at all   Food Insecurity: No Food Insecurity     Worried About Running Out of Food in the Last Year: Never true     Ran Out of Food in the Last Year: Never true   Transportation Needs: No Transportation Needs     Lack of Transportation (Medical): No     Lack of Transportation (Non-Medical): No   Physical Activity: Not on file   Stress: Not on file   Social Connections: Not on file   Intimate Partner Violence: Not At Risk     Fear of Current or Ex-Partner: No     Emotionally Abused: No     Physically Abused: No     Sexually Abused: No   Housing Stability: Not on file           Lab Results    Chemistry/lipid CBC Cardiac Enzymes/BNP/TSH/INR   Lab Results   Component Value Date    CHOL 162 02/11/2016    HDL 64 02/11/2016    TRIG 113 02/11/2016    BUN 15 02/14/2022     02/14/2022    CO2 28 02/14/2022    Lab Results   Component Value Date    WBC 7.1 02/14/2022    HGB 14.1 02/14/2022    HCT 46.4 02/14/2022    MCV 88 02/14/2022     02/14/2022    Lab Results   Component Value Date    TROPONINI 0.33 (HH) 02/09/2022    BNP 2,230 (H) 02/08/2022    TSH 2.81 01/09/2012    INR 1.23 (H) 02/11/2022     Lab Results   Component Value Date    TROPONINI 0.33 (HH) 02/09/2022          Weight:    Wt Readings from Last 3 Encounters:   02/15/22 (!) 164.4 kg (362 lb 6.4 oz)   05/25/21 (!) 181.4 kg (400 lb)   03/05/21 (!) 198 kg (436 lb 8.2  oz)       Allergies  Allergies   Allergen Reactions     Nkda [No Known Drug Allergies]          Surgical History  Past Surgical History:   Procedure Laterality Date     CHOLECYSTECTOMY, LAPOROSCOPIC      Cholecystectomy, Laparoscopic     COLONOSCOPY  2007?    Bathroom issue..results normal     OPEN REDUCTION INTERNAL FIXATION TOE(S)  4/13/2012    Procedure:OPEN REDUCTION INTERNAL FIXATION TOE(S); Open reduction internal fixation right proximal fifth metatarsal fracture-   Anes-choice block; Surgeon:LEY, JEFFREY DUANE; Location:[place] OR       Social History  Tobacco:   History   Smoking Status     Never Smoker   Smokeless Tobacco     Never Used    Alcohol:   Social History    Substance and Sexual Activity      Alcohol use: Yes        Comment: social   Illicit Drugs:   History   Drug Use No       Family History  Family History   Problem Relation Age of Onset     Neurologic Disorder Father         MS     Heart Disease Father         irregular heart rate     Diabetes Father      Melanoma Father      Sleep Apnea Father      Other Cancer Father      Cancer Maternal Grandfather         lung     Other Cancer Maternal Grandfather      Cancer Paternal Grandmother         stomach     Other Cancer Paternal Grandmother      Cancer Mother      Other Cancer Mother      Thyroid Disease Mother      Gynecology Maternal Aunt         PCOS     Hypertension Maternal Grandmother      Anxiety Disorder Maternal Grandmother      Thyroid Disease Maternal Grandmother      Anxiety Disorder Sister           Daniela-Zora Sanchez MD on 2/15/2022      cc: Mikala Luna

## 2022-02-15 NOTE — PROGRESS NOTES
CLINICAL NUTRITION SERVICES    Reviewed nutrition risk factors due to LOS. Pt is tolerating diet, eating well per nursing documentation. Called pt's room to see if she has any questions on low sodium diet for CHF, pt did not answer telephone. Currently unable to visit due to isolation status.  RD will follow as needed, or if consulted.

## 2022-02-15 NOTE — PROGRESS NOTES
[facility]    Medicine Progress Note - Hospitalist Service    Date of Admission:  2/8/2022     Assessment & Plan   SUMMARY:  46 year old female with recent COVID infection on 12/2021 who received monoclonal antibody at that time, MS, chronic lymphedema, hypertension, depression, NARCISA, PCOS, admitted for fever, headache, increased leg swelling and cough found to have pneumonia, repeat Covid PCR now positive again, cellulitis of lower extremity, acute hypoxic respiratory failure, CHF exacerbation, CT chest with PE.    Covid positive  Covid PCR positive on 2/8/2022  Anticipate Covid recovered on 2/18/2022  Patient planning to discharge to home, may need home O2 at the time of discharge    ACTIVE PROBLEM LIST  Acute hypoxic respiratory failure; likely multifactorial etiology-CHF exacerbation, pulmonary embolism, possible bacterial pneumonia, COVID-19 pneumonia, physical deconditioning.    History of NARCISA on home CPAP  --Patient required BiPAP on admission.   --On 2/11, discontinued nighttime BiPAP, resumed home CPAP, tolerating  --On 2/12, oxygen titrated to 2 L.  --Incentive spirometry, flutter valve.  --Treat underlying cause as mentioned below  --Appreciate pulmonary input.     Acute heart failure with preserved EF;  --On admission BNP 2230 and clinically looked volume overload  --Echo reported EF 65%.  Moderately decreased right ventricular systolic function.    --Lasix drip with good response (16L negative balance), now transitioned to IV Lasix push on 2/12, cardiology managing diuretics  --Monitor intake/output, weight, labs closely.  --Appreciated cardiology input.   --still putting out large volumes, BP stable, creatinine stable - anticipate 2-3 more days IV diuresis     Elevated troponin; likely demand ischemia in the setting of acute diastolic heart failure, pulmonary embolism, COVID-19  --Troponin trending down, echo with normal EF, no wall motion abn   --Cardiologist recommended no  further ischemic evaluation at this time, can consider stress testing later and defer timing to cardiology team.     Acute right upper lobe pulmonary embolism;  --CT chest also reported pulmonary artery hypertension and Right heart dysfunction.  --Echo as mentioned above.  --Bilateral lower extremity US negative for DVT.  --Heparin drip transition to Xarelto, continue.  --Appreciated pulmonary input, recommended 3 months of anticoagulation and repeat TTE in 3 months to reeval RV function.  Follow-up with pulmonologist as an outpatient.     Positive COVID-19 test on 12/11/2021 and 2/8/22;  Sepsis likely due to left lower extremity cellulitis;  Positive blood culture; likely contamination  --On admission, CT chest reported new right lung infiltrate compatible with pneumonia.  --On admission, elevated procalcitonin and elevated WBC count.  Normal lactic acid.  --Completed 3 days course of remdesivir per ID recommendation.  --Appreciated ID input, reported fever likely due to LLE cellulitis and less likely bacterial pneumonia.  --On 2/10, IV Vanco discontinued.   --On 2/11 IV Zosyn changed to IV Rocephin.  Appreciated ID input.  --Continue clinical monitoring, lab monitoring  --Wound care nurse on case    Chronic lower extremity lymphedema;  --Initiated lymphedema wrap, continue     Essential hypertension; controlled  --Continue home metoprolol, lisinopril.  Hydralazine as needed     History of MS;  No longer taking medication.  Primarily has left lower extremity weakness and pain.    --PT OT.      Morbid obesity;  Body mass index is 64.51 kg/m .  --Weight reduction program, defer to primary as an outpatient.     Principal Problem:    Acute respiratory failure with hypoxia (H)  Active Problems:    NARCISA (obstructive sleep apnea)    Cellulitis    Pneumonia due to infectious organism, unspecified laterality, unspecified part of lung    Acute pulmonary embolism without acute cor pulmonale, unspecified pulmonary embolism type  "(H)    COVID-19    Acute on chronic diastolic congestive heart failure (H)    NSTEMI (non-ST elevated myocardial infarction) (H)    DVT prophylaxis:    Medical:  rivaroxaban (Xarelto)    Mechanical:  Lower extremity condition precludes SCD's    Disposition Plan: Expected discharge: 02/15/2022   recommended to Home once O2 use less than 5 liters/minute and Adequately diuresed.    Diet: Orders Placed This Encounter      2 Gram Sodium Diet    Brar Catheter: Present  Central Lines/Port-a-cath: Not present  Drains: Not present     --------------------------------------------    The patient's care was discussed with the Patient.    Igor Fernando MD  Hospitalist Service  [facility]  Securely message with the Vocera Web Console (learn more here)  Text page via [employer] Paging/Directory    Clinically Significant Risk Factors Present on Admission             ______________________________________________________________________    Interval History   Patient feeling better every day, feels her strength is returning and her breathing is improved. She would like to go home.    ROS: Denies chest pain, Moving bowels well, passing urine well, slept well and good appetite    Data personally reviewed from the last 24 hours:   Reviewed telemetry, Laboratory results, Consultant recommendations and medications     Physical Exam   /64 (BP Location: Left arm, Patient Position: Semi-[place]'s)   Pulse 89   Temp 98.1  F (36.7  C) (Oral)   Resp 18   Ht 1.626 m (5' 4\")   Wt (!) 164.4 kg (362 lb 6.4 oz)   LMP 01/31/2022   SpO2 93%   BMI 62.21 kg/m      Physical Exam    General Appearance:    HEENT:  Awake, Alert, Cooperative, in no distress and appears stated age   Normocephalic, atraumatic, conjunctiva clear without icterus and ears without discharge   Lungs:   reduced breath sounds at bases   Cardiovascular:  Regular Rate and Rythm, normal apical impulse, normal S1 and S2, 2+ lower extremity edema " bilaterally   Abdomen: Soft, non-tender and Non-distended, active bowel sounds   Skin:  Skin color, texture normal and bruising or bleeding. No rashes or lesions over face, neck, arms and legs, turgor normal.   Neurologic:    Neuropsychiatric: Alert & Oriented X 3, Facial symmetry preserved and upper & lower extremities moving well with symmetry  Calm, normal eye contact, Affect normal     Data   Recent Labs   Lab 02/15/22  0519 02/14/22  0510 02/13/22  0555 02/12/22  0454 02/11/22  1315 02/11/22  0502 02/10/22  0534 02/10/22  0533   WBC  --  7.1  --   --   --  5.9  --  7.0   HGB  --  14.1  --   --   --  12.6  --  11.8   MCV  --  88  --   --   --  90  --  90   PLT  --  274  --   --   --  219  --  213   INR  --   --   --   --   --  1.23*  --   --    NA  --  138 139 140  --  140   < >  --    POTASSIUM 4.1 4.0 3.6 3.5   < > 3.4*   < >  --    CHLORIDE  --  98 96* 97*  --  99   < >  --    CO2  --  28 33* 31  --  33*   < >  --    BUN  --  15 15 13  --  14   < >  --    CR  --  0.74 0.75 0.81  --  0.86   < >  --    ANIONGAP  --  12 10 12  --  8   < >  --    EVITA  --  9.9 9.6 8.9  --  8.5   < >  --    GLC  --  99 102 98  --  97   < >  --     < > = values in this interval not displayed.     No results found for this or any previous visit (from the past 24 hour(s)).

## 2022-02-15 NOTE — PLAN OF CARE
Problem: Gas Exchange Impaired  Goal: Optimal Gas Exchange  Outcome: No Change     Lung sounds clear and diminished t/o with oxygen sats 90% on 3 liters via home Cpap. Saturations did drop to 87% during the night so liter flow increased to 4 liters with sats now in the low 90's. Pt refused repositioning. Will replace Mag per protocol.

## 2022-02-15 NOTE — PLAN OF CARE
Problem: OT General Care Plan  Goal: Transfer (OT)  Description: Transfer (OT)  Outcome: No Change     Problem: Gas Exchange Impaired  Goal: Optimal Gas Exchange  Outcome: Improving    Problem: Impaired Wound Healing  Goal: Optimal Wound Healing  Outcome: Improving    A/O x 4. VSS. On 2 L oxymask. Pt prefers oxymask over nasal cannula. Brar in place and draining. Large, incontinent BM.     Lymph wraps replaced by OT. Dressing on L ankle changed. Wound is scabbed over. Mepilex placed for protection. Open areas in groin folds. Area cleansed and powder applied.

## 2022-02-15 NOTE — PLAN OF CARE
Home Oxygen Assessment Tools  Patient's 02 sat on RA at rest 87% for 5 minutes. Patient is on 2 LPM/ (02 device) Sp02 91 %, after 3 minutes of standing on the side of the bed. If patient's Sp02 at rest is less than 88%, then oxygen may be needed and must monitor patient's Sp02 with activity. Patient 02 87% sat on  RA with activity after 2 minutes. Patient's Sp02 91% on 2 LPM/02  after 2 minutes of (walking activity).

## 2022-02-16 ENCOUNTER — APPOINTMENT (OUTPATIENT)
Dept: OCCUPATIONAL THERAPY | Facility: HOSPITAL | Age: 47
DRG: 871 | End: 2022-02-16
Payer: COMMERCIAL

## 2022-02-16 LAB
MAGNESIUM SERPL-MCNC: 2.1 MG/DL (ref 1.8–2.6)
POTASSIUM BLD-SCNC: 4 MMOL/L (ref 3.5–5)

## 2022-02-16 PROCEDURE — 999N000198 HC STATISTIC WOC PT EDUCATION, 16-30 MIN

## 2022-02-16 PROCEDURE — 99232 SBSQ HOSP IP/OBS MODERATE 35: CPT | Performed by: FAMILY MEDICINE

## 2022-02-16 PROCEDURE — 99232 SBSQ HOSP IP/OBS MODERATE 35: CPT | Performed by: INTERNAL MEDICINE

## 2022-02-16 PROCEDURE — 36415 COLL VENOUS BLD VENIPUNCTURE: CPT | Performed by: INTERNAL MEDICINE

## 2022-02-16 PROCEDURE — 250N000013 HC RX MED GY IP 250 OP 250 PS 637: Performed by: FAMILY MEDICINE

## 2022-02-16 PROCEDURE — 999N000157 HC STATISTIC RCP TIME EA 10 MIN

## 2022-02-16 PROCEDURE — 250N000013 HC RX MED GY IP 250 OP 250 PS 637: Performed by: INTERNAL MEDICINE

## 2022-02-16 PROCEDURE — 84132 ASSAY OF SERUM POTASSIUM: CPT | Performed by: INTERNAL MEDICINE

## 2022-02-16 PROCEDURE — 83735 ASSAY OF MAGNESIUM: CPT | Performed by: INTERNAL MEDICINE

## 2022-02-16 PROCEDURE — 97535 SELF CARE MNGMENT TRAINING: CPT | Mod: GO

## 2022-02-16 PROCEDURE — 210N000001 HC R&B IMCU HEART CARE

## 2022-02-16 PROCEDURE — 250N000011 HC RX IP 250 OP 636: Performed by: INTERNAL MEDICINE

## 2022-02-16 RX ADMIN — CEFUROXIME AXETIL 500 MG: 500 TABLET ORAL at 21:13

## 2022-02-16 RX ADMIN — RIVAROXABAN 15 MG: 15 TABLET, FILM COATED ORAL at 09:10

## 2022-02-16 RX ADMIN — MICONAZOLE NITRATE 1 APPLICATOR: 20 POWDER TOPICAL at 09:11

## 2022-02-16 RX ADMIN — FUROSEMIDE 40 MG: 10 INJECTION, SOLUTION INTRAVENOUS at 06:22

## 2022-02-16 RX ADMIN — GABAPENTIN 200 MG: 100 CAPSULE ORAL at 14:54

## 2022-02-16 RX ADMIN — METOPROLOL SUCCINATE 25 MG: 25 TABLET, EXTENDED RELEASE ORAL at 09:09

## 2022-02-16 RX ADMIN — RIVAROXABAN 15 MG: 15 TABLET, FILM COATED ORAL at 17:07

## 2022-02-16 RX ADMIN — FUROSEMIDE 40 MG: 10 INJECTION, SOLUTION INTRAVENOUS at 19:07

## 2022-02-16 RX ADMIN — GABAPENTIN 200 MG: 100 CAPSULE ORAL at 21:14

## 2022-02-16 RX ADMIN — SERTRALINE HYDROCHLORIDE 100 MG: 50 TABLET ORAL at 09:10

## 2022-02-16 RX ADMIN — MICONAZOLE NITRATE: 20 POWDER TOPICAL at 21:14

## 2022-02-16 RX ADMIN — GABAPENTIN 200 MG: 100 CAPSULE ORAL at 09:10

## 2022-02-16 RX ADMIN — CEFUROXIME AXETIL 500 MG: 500 TABLET ORAL at 09:09

## 2022-02-16 ASSESSMENT — ACTIVITIES OF DAILY LIVING (ADL)
ADLS_ACUITY_SCORE: 27
ADLS_ACUITY_SCORE: 25
ADLS_ACUITY_SCORE: 23
ADLS_ACUITY_SCORE: 27
ADLS_ACUITY_SCORE: 25
ADLS_ACUITY_SCORE: 23
ADLS_ACUITY_SCORE: 27
ADLS_ACUITY_SCORE: 25
ADLS_ACUITY_SCORE: 27
ADLS_ACUITY_SCORE: 23

## 2022-02-16 NOTE — PLAN OF CARE
Problem: Dysrhythmia (Acute Coronary Syndrome)  Goal: Normalized Cardiac Rhythm  Outcome: Improving     Problem: Cardiac-Related Pain (Acute Coronary Syndrome)  Goal: Absence of Cardiac-Related Pain  Outcome: Improving     Problem: Impaired Wound Healing  Goal: Optimal Wound Healing  Outcome: Improving  Intervention: Promote Wound Healing    NSR, rates in the 90's. VSS. 02 wean down to 1 L via Oxymask this shift home CPAP at night bleed with 2L 02. Brar draining. Having menstrual period, skin skin cleaned, powder applied under breast and abdominal pannus with pillow case.  Ate well. Lymphedema wrap on LE. Continue COVID Isolation. On oral antibiotics.

## 2022-02-16 NOTE — PLAN OF CARE
Problem: Adult Inpatient Plan of Care  Goal: Plan of Care Review  Outcome: Ongoing, Progressing           Pt was on Cpap with 5 L bled in.  Switched over to oxymask 2L sat 94-95%. Took pt off O2, RA 92% than later in afternoon pt de-sats mid 80's while pt is sleeping. Put oxymask 2L back on. Took Maykel out at 1500. Continue to monitor U.O.

## 2022-02-16 NOTE — PLAN OF CARE
Problem: Gas Exchange Impaired  Goal: Optimal Gas Exchange  Outcome: No Change        Patient oxygen down to 86% on CPAP with 2L bled into it. Increased to 5L to maintain oxygen above 90%. Lungs diminished. Getting IV lasix. Brar for strict urine output measurement.

## 2022-02-16 NOTE — PHARMACY-CONSULT NOTE
Pharmacy Consult Note - DOAC (rivaroxaban) monitoring anticoagulation effect to ensure efficacy with dosing with BMI ~60 kg/m2    DOAC Indication: PE Treatment  Dosing: 15 mg BID for 21 days, then 20mg daily    Peak Goal Ranges for Treatment of DVT or PE:  - 15 mg BID: no reference ranges  - 20 mg daily: 189-419 ng/mL  Rivaroxaban peak levels should be drawn 2-4 hours after a dose, ideally at steady state (the 4th dose of a BID regimen or 3rd-4th dose of a daily regimen)     Rivaroxaban level drawn 2/15 @1108 (2.5 hrs after dose): 379 ng/mL    Assessment:  Rivaroxaban level drawn is within normal limits, ok to continue current therapy.     The ISTH 2021 guideline update does not recommend to regularly follow peak or trough drug-specific DOAC levels because there are insufficient data to influence management decisions.    References:  https://[email or web address]/yqts-2073-cfie/what-recommendations-are-available-for-laboratory-monitoring-of-direct-oral-anticoagulants-doac/    https://onlinelibrary.baig.com/doi/epdf/10.1111/jth.31733      Thank you for the consult.  Loan Michel, FaithD 2/16/2022 8:35 AM

## 2022-02-16 NOTE — PROGRESS NOTES
HEART CARE NOTE          Assessment/Recommendations     1. HFpEF/RV dysfunction c/b ADHF  Assessment / Plan  Echo significant for RV dysfunction in the setting of PE  Diuresing well on current regimen; no changes at time  Will need repeat echo to reassess RV function and RVSP in several months which may determine the need for VQ scan to assess for chromic PEs     2. Elevated troponin  Assessment / Plan  Mildly elevated in the setting of ADHF, COVID-19 infection and PE and likely associated RV strain; now trending down  Will hold off on further ischemic evaluation at this time as patient is chest pain free; can consider stress testing once COVID-19 infection has been treated     3. COVID-19 infection  Assessment / Plan  Management primary team     Start Time:  9:15 am  End Time: 9:40 am    History of Present Illness/Subjective      Ms. Bess Enamorado is a 46 year old female with a PMHx significant for with a recent COVID infection on 12/6/2021 who received monoclonal antibody, MS, chronic lymphedema, hypertension, depression, obstructive sleep apnea, PCOS, MS, admitted for fever, headache, increased leg swelling and cough found to have pneumonia, repeat positive Covid PCR, cellulitis, acute hypoxic respiratory failure and suspected CHF with a small PE.     Observed through doorway     ECG: Personally reviewed. sinus tachycardia.     ECHO:   1.Left ventricular size, wall motion and function are normal. The ejection  fraction is > 65%.  2.Moderately decreased right ventricular systolic function  3.No hemodynamically significant valvular abnormalities on 2D or color flow  imaging.  4.Technically difficult, suboptimal study due to patient size.  There is no comparison study available.     CT chest pulmonary:  IMPRESSION:  1.  Small nonocclusive right upper lobe pulmonary embolism.  2.  Pulmonary arterial hypertension.  3.  Right heart dysfunction.  4.  New right lung infiltrates compatible with pneumonia.         "  Physical Examination   /67 (BP Location: Left arm, Patient Position: Supine)   Pulse 87   Temp 98.4  F (36.9  C) (Oral)   Resp 20   Ht 1.626 m (5' 4\")   Wt (!) 164.4 kg (362 lb 6.4 oz)   LMP 01/31/2022   SpO2 90%   BMI 62.21 kg/m    Body mass index is 62.21 kg/m .  Wt Readings from Last 3 Encounters:   02/15/22 (!) 164.4 kg (362 lb 6.4 oz)   05/25/21 (!) 181.4 kg (400 lb)   03/05/21 (!) 198 kg (436 lb 8.2 oz)     Given that patient is COVD-19 positive, physical exam and ROS have been deferred. Please refer to Dr. Fernando's excellent note for both.         Medical History  Surgical History Family History Social History   Past Medical History:   Diagnosis Date     Acute on chronic diastolic congestive heart failure (H) 2/9/2022     Arthritis 2019    Hips     ASCUS favor benign 8/2015    Neg HPV     Depressive disorder 2010     H/O colposcopy with cervical biopsy 9/14/15    Bx & ECC - negative     Hypertension 2019     LSIL on Pap smear 7/2014    Neg high risk HPV     Multiple sclerosis (H) 8/29/2005 9/18/200pt dx with MS 2004--dx by neurolgist and is followed by Dr. Steven Ayala 10/2016     UNSPEC CONSTIPATION 9/18/2006 9/18/2006   Has tried otc suppository and otc lax.     Past Surgical History:   Procedure Laterality Date     CHOLECYSTECTOMY, LAPOROSCOPIC      Cholecystectomy, Laparoscopic     COLONOSCOPY  2007?    Bathroom issue..results normal     OPEN REDUCTION INTERNAL FIXATION TOE(S)  4/13/2012    Procedure:OPEN REDUCTION INTERNAL FIXATION TOE(S); Open reduction internal fixation right proximal fifth metatarsal fracture-   Anes-choice block; Surgeon:LEY, JEFFREY DUANE; Location:[facility] OR    no family history of premature coronary artery disease Social History     Socioeconomic History     Marital status: Single     Spouse name: Not on file     Number of children: Not on file     Years of education: Not on file     Highest education level: Not on file   Occupational History     Employer: " [employer]   Tobacco Use     Smoking status: Never Smoker     Smokeless tobacco: Never Used   Vaping Use     Vaping Use: Never used   Substance and Sexual Activity     Alcohol use: Yes     Comment: social     Drug use: No     Sexual activity: Not Currently     Partners: Male     Birth control/protection: Pill   Other Topics Concern     Parent/sibling w/ CABG, MI or angioplasty before 65F 55M? No   Social History Narrative    , 3rd-5th grade     Social Determinants of Health     Financial Resource Strain: Low Risk      Difficulty of Paying Living Expenses: Not hard at all   Food Insecurity: No Food Insecurity     Worried About Running Out of Food in the Last Year: Never true     Ran Out of Food in the Last Year: Never true   Transportation Needs: No Transportation Needs     Lack of Transportation (Medical): No     Lack of Transportation (Non-Medical): No   Physical Activity: Not on file   Stress: Not on file   Social Connections: Not on file   Intimate Partner Violence: Not At Risk     Fear of Current or Ex-Partner: No     Emotionally Abused: No     Physically Abused: No     Sexually Abused: No   Housing Stability: Not on file           Lab Results    Chemistry/lipid CBC Cardiac Enzymes/BNP/TSH/INR   Lab Results   Component Value Date    CHOL 162 02/11/2016    HDL 64 02/11/2016    TRIG 113 02/11/2016    BUN 15 02/14/2022     02/14/2022    CO2 28 02/14/2022    Lab Results   Component Value Date    WBC 7.1 02/14/2022    HGB 14.1 02/14/2022    HCT 46.4 02/14/2022    MCV 88 02/14/2022     02/14/2022    Lab Results   Component Value Date    TROPONINI 0.33 (HH) 02/09/2022    BNP 2,230 (H) 02/08/2022    TSH 2.81 01/09/2012    INR 1.23 (H) 02/11/2022     Lab Results   Component Value Date    TROPONINI 0.33 (HH) 02/09/2022          Weight:    Wt Readings from Last 3 Encounters:   02/15/22 (!) 164.4 kg (362 lb 6.4 oz)   05/25/21 (!) 181.4 kg (400 lb)   03/05/21 (!) 198 kg (436 lb 8.2  oz)       Allergies  Allergies   Allergen Reactions     Nkda [No Known Drug Allergies]          Surgical History  Past Surgical History:   Procedure Laterality Date     CHOLECYSTECTOMY, LAPOROSCOPIC      Cholecystectomy, Laparoscopic     COLONOSCOPY  2007?    Bathroom issue..results normal     OPEN REDUCTION INTERNAL FIXATION TOE(S)  4/13/2012    Procedure:OPEN REDUCTION INTERNAL FIXATION TOE(S); Open reduction internal fixation right proximal fifth metatarsal fracture-   Anes-choice block; Surgeon:LEY, JEFFREY DUANE; Location:[facility] OR       Social History  Tobacco:   History   Smoking Status     Never Smoker   Smokeless Tobacco     Never Used    Alcohol:   Social History    Substance and Sexual Activity      Alcohol use: Yes        Comment: social   Illicit Drugs:   History   Drug Use No       Family History  Family History   Problem Relation Age of Onset     Neurologic Disorder Father         MS     Heart Disease Father         irregular heart rate     Diabetes Father      Melanoma Father      Sleep Apnea Father      Other Cancer Father      Cancer Maternal Grandfather         lung     Other Cancer Maternal Grandfather      Cancer Paternal Grandmother         stomach     Other Cancer Paternal Grandmother      Cancer Mother      Other Cancer Mother      Thyroid Disease Mother      Gynecology Maternal Aunt         PCOS     Hypertension Maternal Grandmother      Anxiety Disorder Maternal Grandmother      Thyroid Disease Maternal Grandmother      Anxiety Disorder Sister           Daniela-Zora Sanchez MD on 2/16/2022      cc: Mikala Luna

## 2022-02-16 NOTE — PROGRESS NOTES
Wound Ostomy  WOC Assessment       Allergies:  Nkda [No Known Drug Allergies]    Diagnosis:   Patient Active Problem List    Diagnosis Date Noted     Acute on chronic diastolic congestive heart failure (H) 02/09/2022     Priority: Medium     Acute respiratory failure with hypoxia (H) 02/09/2022     Priority: Medium     NSTEMI (non-ST elevated myocardial infarction) (H) 02/09/2022     Priority: Medium     Pneumonia due to infectious organism, unspecified laterality, unspecified part of lung 02/08/2022     Priority: Medium     Acute pulmonary embolism without acute cor pulmonale, unspecified pulmonary embolism type (H) 02/08/2022     Priority: Medium     COVID-19 02/08/2022     Priority: Medium     Cellulitis 03/05/2021     Priority: Medium     Tachycardia 03/04/2021     Priority: Medium     SIRS (systemic inflammatory response syndrome) (H) 03/04/2021     Priority: Medium     Cellulitis of right leg 03/04/2021     Priority: Medium     Lymphedema of both lower extremities 06/15/2020     Priority: Medium     Generalized anxiety disorder 03/23/2020     Priority: Medium     History of abnormal cervical Pap smear 08/26/2019     Priority: Medium     Cellulitis of abdominal wall 10/22/2018     Priority: Medium     Moderate episode of recurrent major depressive disorder (H) 03/23/2018     Priority: Medium     NARCISA (obstructive sleep apnea) 08/22/2017     Priority: Medium     Severe NARCISA with sleep-associated hypoxemia, with likely REM-related hypoventilation  Polysomnography - Test date 8/9/2017  AHI 45.9, basline SpO2 92.9%, mike SpO2 54.9%, time of SpO2 <= 88% of 31.7 minutes.  Severe desaturations and sustained hypoxemia confined to singular supine REM period (no lateral REM observed for comparison).  Also seen to have TCM increase by ~15 mmHg over baseline in supine REM, consistent with hypoventilation.  Pre-study VBG was WNL.  CPAP titrated to 10 cm H2O and appeared optimal, including supine REM, with normalization of  SpO2 and TCM normalized to low-40's.  Seen to have frequent PLM's (64.3 / hour on diagnostic, 89.6 / hour on treatment, ~20% associated with cortical arousals).         Benign essential hypertension 02/15/2016     Priority: Medium     Peroneal tendon rupture 11/23/2015     Priority: Medium     Morbid obesity (H) 08/24/2015     Priority: Medium     Oct 2016:  Starting [employer] program in [place], goal to lose 140 lbs over next 2 years       Papanicolaou smear of cervix with low grade squamous intraepithelial lesion (LGSIL) 08/03/2014     Priority: Medium     7/29/2014:Pap--LSIL. Neg high risk HPV. Plan cotest in 1 year (if this is ASCUS or LSIL then colp). In reminders  8/20/15: Pap - ASCUS, Neg HPV. Plan colp  9/14/15: [place] Bx & ECC - negative. Plan cotest in 1 year.   10/20/16: NIL Pap, Neg HPV. Plan cotest in 3 years.        Eating disorder 08/25/2013     Priority: Medium     Binge-eating disorder; social phobia  See psychological assessment from [facility], Dr. Anupama Bowie, 8/14/2013  Problem list name updated by automated process. Provider to review       Leg edema 04/19/2013     Priority: Medium     Anxiety 06/28/2011     Priority: Medium     Followed by neurologist       Restless legs 06/28/2011     Priority: Medium     CARDIOVASCULAR SCREENING; LDL GOAL LESS THAN 160 10/31/2010     Priority: Medium     Constipation 09/18/2006     Priority: Medium     9/18/2006    Has tried otc suppository and otc lax.   Problem list name updated by automated process. Provider to review       Multiple sclerosis (H) 08/29/2005     Priority: Medium     9/18/200pt dx with MS 2004--dx by neurolgist and is followed by Dr. Jeet Nicholson, also MS nurse Heather Thomas       Polycystic ovaries 08/29/2005     Priority: Medium     Diagnosed in [place], had facial hair, overweight, carb craving, records being requested, never on metformin       Labs:  Recent Labs   Lab Test 02/09/22  0457 02/08/22  1620 02/08/22  1115    CRP 21.8*  --  7.9*   HGB 12.7   < > 13.7   ALBUMIN  --   --  3.1*    < > = values in this interval not displayed.       Ben:  Ben Score: 12    Wound culture obtained: No    Edema:  Yes:  Generalized    Chart reviewed and orders updated.  Anatomic Site/Laterality: L lateral ankle    Reason for ongoing care:   Plan of care    Encounter Type:  Subsequent Encounter Pt with lymphedema wraps in place. Per nursing assessment from 2/15/22 - L ankle wound is now covered with dried drainage. Will stop Silvercel as there does not appear to be any drainage at this time. Continue with Mepilex to cover area for protection.    WOC will s/o at this time.

## 2022-02-17 ENCOUNTER — APPOINTMENT (OUTPATIENT)
Dept: PHYSICAL THERAPY | Facility: HOSPITAL | Age: 47
DRG: 871 | End: 2022-02-17
Payer: COMMERCIAL

## 2022-02-17 ENCOUNTER — APPOINTMENT (OUTPATIENT)
Dept: OCCUPATIONAL THERAPY | Facility: HOSPITAL | Age: 47
DRG: 871 | End: 2022-02-17
Payer: COMMERCIAL

## 2022-02-17 LAB
ANION GAP SERPL CALCULATED.3IONS-SCNC: 13 MMOL/L (ref 5–18)
BUN SERPL-MCNC: 17 MG/DL (ref 8–22)
CALCIUM SERPL-MCNC: 9.9 MG/DL (ref 8.5–10.5)
CHLORIDE BLD-SCNC: 98 MMOL/L (ref 98–107)
CO2 SERPL-SCNC: 27 MMOL/L (ref 22–31)
CREAT SERPL-MCNC: 0.85 MG/DL (ref 0.6–1.1)
GFR SERPL CREATININE-BSD FRML MDRD: 85 ML/MIN/1.73M2
GLUCOSE BLD-MCNC: 102 MG/DL (ref 70–125)
HOLD SPECIMEN: NORMAL
HOLD SPECIMEN: NORMAL
MAGNESIUM SERPL-MCNC: 1.9 MG/DL (ref 1.8–2.6)
POTASSIUM BLD-SCNC: 3.9 MMOL/L (ref 3.5–5)
POTASSIUM BLD-SCNC: 4 MMOL/L (ref 3.5–5)
SODIUM SERPL-SCNC: 138 MMOL/L (ref 136–145)

## 2022-02-17 PROCEDURE — 84132 ASSAY OF SERUM POTASSIUM: CPT | Performed by: INTERNAL MEDICINE

## 2022-02-17 PROCEDURE — 97530 THERAPEUTIC ACTIVITIES: CPT | Mod: GP

## 2022-02-17 PROCEDURE — 99231 SBSQ HOSP IP/OBS SF/LOW 25: CPT | Performed by: INTERNAL MEDICINE

## 2022-02-17 PROCEDURE — 250N000013 HC RX MED GY IP 250 OP 250 PS 637: Performed by: INTERNAL MEDICINE

## 2022-02-17 PROCEDURE — 80048 BASIC METABOLIC PNL TOTAL CA: CPT | Performed by: INTERNAL MEDICINE

## 2022-02-17 PROCEDURE — 250N000013 HC RX MED GY IP 250 OP 250 PS 637: Performed by: FAMILY MEDICINE

## 2022-02-17 PROCEDURE — 97110 THERAPEUTIC EXERCISES: CPT | Mod: GO

## 2022-02-17 PROCEDURE — 99232 SBSQ HOSP IP/OBS MODERATE 35: CPT | Performed by: INTERNAL MEDICINE

## 2022-02-17 PROCEDURE — 97110 THERAPEUTIC EXERCISES: CPT | Mod: GP

## 2022-02-17 PROCEDURE — 97535 SELF CARE MNGMENT TRAINING: CPT | Mod: GO

## 2022-02-17 PROCEDURE — 210N000001 HC R&B IMCU HEART CARE

## 2022-02-17 PROCEDURE — 97116 GAIT TRAINING THERAPY: CPT | Mod: GP

## 2022-02-17 PROCEDURE — 83735 ASSAY OF MAGNESIUM: CPT | Performed by: INTERNAL MEDICINE

## 2022-02-17 PROCEDURE — 36415 COLL VENOUS BLD VENIPUNCTURE: CPT | Performed by: INTERNAL MEDICINE

## 2022-02-17 RX ORDER — MAGNESIUM OXIDE 400 MG/1
400 TABLET ORAL 2 TIMES DAILY
Qty: 4 TABLET | Refills: 0 | Status: SHIPPED | OUTPATIENT
Start: 2022-02-17 | End: 2022-03-02

## 2022-02-17 RX ORDER — BUMETANIDE 2 MG/1
2 TABLET ORAL DAILY
Qty: 30 TABLET | Refills: 0 | Status: SHIPPED | OUTPATIENT
Start: 2022-02-18 | End: 2022-02-18

## 2022-02-17 RX ORDER — BUMETANIDE 1 MG/1
2 TABLET ORAL DAILY
Status: DISCONTINUED | OUTPATIENT
Start: 2022-02-17 | End: 2022-02-18

## 2022-02-17 RX ORDER — DESOGESTREL AND ETHINYL ESTRADIOL 0.15-0.03
KIT ORAL
Qty: 112 TABLET | Refills: 3 | Status: SHIPPED | OUTPATIENT
Start: 2022-03-05 | End: 2023-12-21

## 2022-02-17 RX ORDER — MAGNESIUM OXIDE 400 MG/1
400 TABLET ORAL 2 TIMES DAILY
Status: DISCONTINUED | OUTPATIENT
Start: 2022-02-17 | End: 2022-02-18 | Stop reason: HOSPADM

## 2022-02-17 RX ORDER — DESOGESTREL AND ETHINYL ESTRADIOL 0.15-0.03
1 KIT ORAL DAILY
Status: DISCONTINUED | OUTPATIENT
Start: 2022-03-05 | End: 2022-02-18 | Stop reason: HOSPADM

## 2022-02-17 RX ORDER — CEFUROXIME AXETIL 500 MG/1
500 TABLET ORAL EVERY 12 HOURS
Qty: 10 TABLET | Refills: 0 | Status: ON HOLD | OUTPATIENT
Start: 2022-02-17 | End: 2022-02-26

## 2022-02-17 RX ADMIN — SERTRALINE HYDROCHLORIDE 100 MG: 50 TABLET ORAL at 09:57

## 2022-02-17 RX ADMIN — METOPROLOL SUCCINATE 25 MG: 25 TABLET, EXTENDED RELEASE ORAL at 10:06

## 2022-02-17 RX ADMIN — RIVAROXABAN 15 MG: 15 TABLET, FILM COATED ORAL at 09:57

## 2022-02-17 RX ADMIN — BUMETANIDE 2 MG: 1 TABLET ORAL at 09:57

## 2022-02-17 RX ADMIN — MICONAZOLE NITRATE: 20 POWDER TOPICAL at 10:01

## 2022-02-17 RX ADMIN — GABAPENTIN 200 MG: 100 CAPSULE ORAL at 09:58

## 2022-02-17 RX ADMIN — CEFUROXIME AXETIL 500 MG: 500 TABLET ORAL at 20:46

## 2022-02-17 RX ADMIN — MICONAZOLE NITRATE: 20 POWDER TOPICAL at 20:49

## 2022-02-17 RX ADMIN — LISINOPRIL 10 MG: 5 TABLET ORAL at 09:58

## 2022-02-17 RX ADMIN — CEFUROXIME AXETIL 500 MG: 500 TABLET ORAL at 09:57

## 2022-02-17 RX ADMIN — MAGNESIUM OXIDE TAB 400 MG (241.3 MG ELEMENTAL MG) 400 MG: 400 (241.3 MG) TAB at 09:58

## 2022-02-17 RX ADMIN — RIVAROXABAN 15 MG: 15 TABLET, FILM COATED ORAL at 17:12

## 2022-02-17 RX ADMIN — GABAPENTIN 200 MG: 100 CAPSULE ORAL at 20:46

## 2022-02-17 RX ADMIN — MAGNESIUM OXIDE TAB 400 MG (241.3 MG ELEMENTAL MG) 400 MG: 400 (241.3 MG) TAB at 20:46

## 2022-02-17 RX ADMIN — GABAPENTIN 200 MG: 100 CAPSULE ORAL at 13:37

## 2022-02-17 ASSESSMENT — ACTIVITIES OF DAILY LIVING (ADL)
ADLS_ACUITY_SCORE: 27
ADLS_ACUITY_SCORE: 29
ADLS_ACUITY_SCORE: 27
ADLS_ACUITY_SCORE: 29
ADLS_ACUITY_SCORE: 27
ADLS_ACUITY_SCORE: 29
ADLS_ACUITY_SCORE: 29
ADLS_ACUITY_SCORE: 27
ADLS_ACUITY_SCORE: 29
ADLS_ACUITY_SCORE: 27
ADLS_ACUITY_SCORE: 29
ADLS_ACUITY_SCORE: 29
ADLS_ACUITY_SCORE: 27
ADLS_ACUITY_SCORE: 29
ADLS_ACUITY_SCORE: 27

## 2022-02-17 NOTE — PROGRESS NOTES
[facility]    Medicine Progress Note - Hospitalist Service    Date of Admission:  2/8/2022     Assessment & Plan   SUMMARY:  46 year old female with recent COVID infection on 12/2021 who received monoclonal antibody at that time, MS, chronic lymphedema, hypertension, depression, NARCISA, PCOS, admitted for fever, headache, increased leg swelling and cough found to have pneumonia, repeat Covid PCR now positive again, cellulitis of lower extremity, acute hypoxic respiratory failure, CHF exacerbation, CT chest with PE.    Covid positive  Covid PCR positive on 2/8/2022  Anticipate Covid recovered on 2/18/2022  Patient planning to discharge to home, may need home O2 at the time of discharge    ACTIVE PROBLEM LIST  Acute hypoxic respiratory failure; likely multifactorial etiology-CHF exacerbation, pulmonary embolism, possible bacterial pneumonia, COVID-19 pneumonia, physical deconditioning.    History of NARCISA on home CPAP  --Patient required BiPAP on admission.   --On 2/11, discontinued nighttime BiPAP, resumed home CPAP, tolerating  --On 2/12, oxygen titrated to 2 L.  --On 12/16 briefly weaned off NC O2 but she is needing 5L O2 on CPAP when sleeping.  --Incentive spirometry, flutter valve.  --Treat underlying cause as mentioned below  --Appreciate pulmonary/cardiology input.     Acute heart failure with preserved EF;  --On admission BNP 2230 and clinically looked volume overload  --Echo reported EF 65%.  Moderately decreased right ventricular systolic function.    --Lasix drip with good response (16L negative balance), now transitioned to IV Lasix push on 2/12, cardiology managing diuretics  --Monitor intake/output, weight, labs closely.  --Appreciated cardiology input.   --still putting out large volumes, BP stable, creatinine stable - anticipate 1-2 more days IV diuresis     Elevated troponin; likely demand ischemia in the setting of acute diastolic heart failure, pulmonary embolism,  COVID-19  --Troponin trending down, echo with normal EF, no wall motion abn   --Cardiologist recommended no further ischemic evaluation at this time, can consider stress testing later and defer timing to cardiology team.     Acute right upper lobe pulmonary embolism;  --CT chest also reported pulmonary artery hypertension and Right heart dysfunction.  --Echo as mentioned above.  --Bilateral lower extremity US negative for DVT.  --Heparin drip transition to Xarelto, continue.  --Appreciated pulmonary input, recommended 3 months of anticoagulation and repeat TTE in 3 months to reeval RV function.  Follow-up with pulmonologist as an outpatient.     Positive COVID-19 test on 12/11/2021 and 2/8/22;  Sepsis likely due to left lower extremity cellulitis;  Positive blood culture; likely contamination  --On admission, CT chest reported new right lung infiltrate compatible with pneumonia.  --On admission, elevated procalcitonin and elevated WBC count.  Normal lactic acid.  --Completed 3 days course of remdesivir per ID recommendation.  --Appreciated ID input, reported fever likely due to LLE cellulitis and less likely bacterial pneumonia.  --On 2/10, IV Vanco discontinued.   --On 2/11 IV Zosyn changed to IV Rocephin.  Appreciated ID input.  --Continue clinical monitoring, lab monitoring  --Wound care nurse on case    Chronic lower extremity lymphedema;  --Initiated lymphedema wrap, continue     Essential hypertension; controlled  --Continue home metoprolol, lisinopril.  Hydralazine as needed     History of MS;  No longer taking medication.  Primarily has left lower extremity weakness and pain.    --PT OT.      Morbid obesity;  Body mass index is 64.51 kg/m .  --Weight reduction program, defer to primary as an outpatient.     Principal Problem:    Acute respiratory failure with hypoxia (H)  Active Problems:    NARCISA (obstructive sleep apnea)    Cellulitis    Pneumonia due to infectious organism, unspecified laterality,  "unspecified part of lung    Acute pulmonary embolism without acute cor pulmonale, unspecified pulmonary embolism type (H)    COVID-19    Acute on chronic diastolic congestive heart failure (H)    NSTEMI (non-ST elevated myocardial infarction) (H)    DVT prophylaxis:    Medical:  rivaroxaban (Xarelto)    Mechanical:  Lower extremity condition precludes SCD's    Disposition Plan: Expected discharge: 02/17/2022   recommended to Home once O2 use less than 5 liters/minute and Adequately diuresed.    Diet: Orders Placed This Encounter      2 Gram Sodium Diet    Brar Catheter: Present  Central Lines/Port-a-cath: Not present  Drains: Not present     --------------------------------------------    The patient's care was discussed with the Patient.          ______________________________________________________________________    Interval History   Patient feeling better every day,frustrated that she still needs O2.  Wants to discharge home soon.    ROS: Denies chest pain, Moving bowels well, passing urine well, slept well and good appetite    Data personally reviewed from the last 24 hours:   Reviewed telemetry, Laboratory results, Consultant recommendations and medications     Physical Exam   /81 (BP Location: Left arm)   Pulse 92   Temp 98.4  F (36.9  C) (Oral)   Resp 17   Ht 1.626 m (5' 4\")   Wt (!) 155.6 kg (343 lb 1.6 oz)   LMP 01/31/2022   SpO2 96%   BMI 58.89 kg/m      Physical Exam    General Appearance:    HEENT:  Awake, Alert, Cooperative, in no distress and appears stated age   Normocephalic, atraumatic, conjunctiva clear without icterus and ears without discharge   Lungs:   reduced breath sounds at bases   Cardiovascular:  Regular Rate and Rythm, normal apical impulse, normal S1 and S2, 2+ lower extremity edema bilaterally   Abdomen: Soft, non-tender and Non-distended, active bowel sounds   Skin:  Skin color, texture normal and bruising or bleeding. No rashes or lesions over face, neck, arms and " legs, turgor normal.   Neurologic:    Neuropsychiatric: Alert & Oriented X 3, Facial symmetry preserved and upper & lower extremities moving well with symmetry  Calm, normal eye contact, Affect normal     Data   Recent Labs   Lab 02/16/22  0614 02/15/22  0519 02/14/22  0510 02/13/22  0555 02/12/22  0454 02/11/22  1315 02/11/22  0502 02/10/22  0534 02/10/22  0533   WBC  --   --  7.1  --   --   --  5.9  --  7.0   HGB  --   --  14.1  --   --   --  12.6  --  11.8   MCV  --   --  88  --   --   --  90  --  90   PLT  --   --  274  --   --   --  219  --  213   INR  --   --   --   --   --   --  1.23*  --   --    NA  --   --  138 139 140  --  140   < >  --    POTASSIUM 4.0 4.1 4.0 3.6 3.5   < > 3.4*   < >  --    CHLORIDE  --   --  98 96* 97*  --  99   < >  --    CO2  --   --  28 33* 31  --  33*   < >  --    BUN  --   --  15 15 13  --  14   < >  --    CR  --   --  0.74 0.75 0.81  --  0.86   < >  --    ANIONGAP  --   --  12 10 12  --  8   < >  --    EVITA  --   --  9.9 9.6 8.9  --  8.5   < >  --    GLC  --   --  99 102 98  --  97   < >  --     < > = values in this interval not displayed.     No results found for this or any previous visit (from the past 24 hour(s)).

## 2022-02-17 NOTE — PROGRESS NOTES
HEART CARE NOTE          Assessment/Recommendations     1. HFpEF/RV dysfunction c/b ADHF  Assessment / Plan  Echo significant for RV dysfunction in the setting of PE  Will transition to oral diuretic regimen - continue monitor volume status and UOP  Will need repeat echo to reassess RV function and RVSP in several months which may determine the need for VQ scan to assess for chromic PEs     2. Elevated troponin  Assessment / Plan  Mildly elevated in the setting of ADHF, COVID-19 infection and PE and likely associated RV strain; now trending down  Will hold off on further ischemic evaluation at this time as patient is chest pain free; can consider stress testing once COVID-19 infection has been treated     3. COVID-19 infection  Assessment / Plan  Management primary team     Start Time: 9:25 am  End Time:9:30  am    Cardiology team will sign-off for now. Please do not hesitate to consult us again if new questions or concerns arise. Follow-up appointment will be arranged by CORE/HF clinic.       History of Present Illness/Subjective      Ms. Bess Enamorado is a 46 year old female with a PMHx significant for with a recent COVID infection on 12/6/2021 who received monoclonal antibody, MS, chronic lymphedema, hypertension, depression, obstructive sleep apnea, PCOS, MS, admitted for fever, headache, increased leg swelling and cough found to have pneumonia, repeat positive Covid PCR, cellulitis, acute hypoxic respiratory failure and suspected CHF with a small PE.     Observed through doorway     ECG: Personally reviewed. sinus tachycardia.     ECHO:   1.Left ventricular size, wall motion and function are normal. The ejection  fraction is > 65%.  2.Moderately decreased right ventricular systolic function  3.No hemodynamically significant valvular abnormalities on 2D or color flow  imaging.  4.Technically difficult, suboptimal study due to patient size.  There is no comparison study available.     CT chest  "pulmonary:  IMPRESSION:  1.  Small nonocclusive right upper lobe pulmonary embolism.  2.  Pulmonary arterial hypertension.  3.  Right heart dysfunction.  4.  New right lung infiltrates compatible with pneumonia.          Physical Examination   /69 (BP Location: Left arm)   Pulse 92   Temp 98.5  F (36.9  C) (Oral)   Resp 20   Ht 1.626 m (5' 4\")   Wt (!) 156 kg (344 lb)   LMP 01/31/2022   SpO2 93%   BMI 59.05 kg/m    Body mass index is 59.05 kg/m .  Wt Readings from Last 3 Encounters:   02/17/22 (!) 156 kg (344 lb)   05/25/21 (!) 181.4 kg (400 lb)   03/05/21 (!) 198 kg (436 lb 8.2 oz)     Given that patient is COVD-19 positive, physical exam and ROS have been deferred. Please refer to Dr. Ortega's excellent note for both.         Medical History  Surgical History Family History Social History   Past Medical History:   Diagnosis Date     Acute on chronic diastolic congestive heart failure (H) 2/9/2022     Arthritis 2019    Hips     ASCUS favor benign 8/2015    Neg HPV     Depressive disorder 2010     H/O colposcopy with cervical biopsy 9/14/15    Bx & ECC - negative     Hypertension 2019     LSIL on Pap smear 7/2014    Neg high risk HPV     Multiple sclerosis (H) 8/29/2005 9/18/200pt dx with MS 2004--dx by neurolgist and is followed by Dr. Steven Ayala 10/2016     UNSPEC CONSTIPATION 9/18/2006 9/18/2006   Has tried otc suppository and otc lax.     Past Surgical History:   Procedure Laterality Date     CHOLECYSTECTOMY, LAPOROSCOPIC      Cholecystectomy, Laparoscopic     COLONOSCOPY  2007?    Bathroom issue..results normal     OPEN REDUCTION INTERNAL FIXATION TOE(S)  4/13/2012    Procedure:OPEN REDUCTION INTERNAL FIXATION TOE(S); Open reduction internal fixation right proximal fifth metatarsal fracture-   Anes-choice block; Surgeon:LEY, JEFFREY DUANE; Location:[facility] OR    no family history of premature coronary artery disease Social History     Socioeconomic History     Marital status: Single "     Spouse name: Not on file     Number of children: Not on file     Years of education: Not on file     Highest education level: Not on file   Occupational History     Employer: [employer]   Tobacco Use     Smoking status: Never Smoker     Smokeless tobacco: Never Used   Vaping Use     Vaping Use: Never used   Substance and Sexual Activity     Alcohol use: Yes     Comment: social     Drug use: No     Sexual activity: Not Currently     Partners: Male     Birth control/protection: Pill   Other Topics Concern     Parent/sibling w/ CABG, MI or angioplasty before 65F 55M? No   Social History Narrative    , 3rd-5th grade     Social Determinants of Health     Financial Resource Strain: Low Risk      Difficulty of Paying Living Expenses: Not hard at all   Food Insecurity: No Food Insecurity     Worried About Running Out of Food in the Last Year: Never true     Ran Out of Food in the Last Year: Never true   Transportation Needs: No Transportation Needs     Lack of Transportation (Medical): No     Lack of Transportation (Non-Medical): No   Physical Activity: Not on file   Stress: Not on file   Social Connections: Not on file   Intimate Partner Violence: Not At Risk     Fear of Current or Ex-Partner: No     Emotionally Abused: No     Physically Abused: No     Sexually Abused: No   Housing Stability: Not on file           Lab Results    Chemistry/lipid CBC Cardiac Enzymes/BNP/TSH/INR   Lab Results   Component Value Date    CHOL 162 02/11/2016    HDL 64 02/11/2016    TRIG 113 02/11/2016    BUN 15 02/14/2022     02/14/2022    CO2 28 02/14/2022    Lab Results   Component Value Date    WBC 7.1 02/14/2022    HGB 14.1 02/14/2022    HCT 46.4 02/14/2022    MCV 88 02/14/2022     02/14/2022    Lab Results   Component Value Date    TROPONINI 0.33 (HH) 02/09/2022    BNP 2,230 (H) 02/08/2022    TSH 2.81 01/09/2012    INR 1.23 (H) 02/11/2022     Lab Results   Component Value Date    TROPONINI 0.33  (HH) 02/09/2022          Weight:    Wt Readings from Last 3 Encounters:   02/17/22 (!) 156 kg (344 lb)   05/25/21 (!) 181.4 kg (400 lb)   03/05/21 (!) 198 kg (436 lb 8.2 oz)       Allergies  Allergies   Allergen Reactions     Nkda [No Known Drug Allergies]          Surgical History  Past Surgical History:   Procedure Laterality Date     CHOLECYSTECTOMY, LAPOROSCOPIC      Cholecystectomy, Laparoscopic     COLONOSCOPY  2007?    Bathroom issue..results normal     OPEN REDUCTION INTERNAL FIXATION TOE(S)  4/13/2012    Procedure:OPEN REDUCTION INTERNAL FIXATION TOE(S); Open reduction internal fixation right proximal fifth metatarsal fracture-   Anes-choice block; Surgeon:LEY, JEFFREY DUANE; Location:[place] OR       Social History  Tobacco:   History   Smoking Status     Never Smoker   Smokeless Tobacco     Never Used    Alcohol:   Social History    Substance and Sexual Activity      Alcohol use: Yes        Comment: social   Illicit Drugs:   History   Drug Use No       Family History  Family History   Problem Relation Age of Onset     Neurologic Disorder Father         MS     Heart Disease Father         irregular heart rate     Diabetes Father      Melanoma Father      Sleep Apnea Father      Other Cancer Father      Cancer Maternal Grandfather         lung     Other Cancer Maternal Grandfather      Cancer Paternal Grandmother         stomach     Other Cancer Paternal Grandmother      Cancer Mother      Other Cancer Mother      Thyroid Disease Mother      Gynecology Maternal Aunt         PCOS     Hypertension Maternal Grandmother      Anxiety Disorder Maternal Grandmother      Thyroid Disease Maternal Grandmother      Anxiety Disorder Sister           Raúl Sanchez MD on 2/17/2022      cc: Mikala Luna

## 2022-02-17 NOTE — PLAN OF CARE
Problem: Adult Inpatient Plan of Care  Goal: Optimal Comfort and Wellbeing  Outcome: Ongoing, Not Progressing     Problem: Impaired Wound Healing  Goal: Optimal Wound Healing  Outcome: Ongoing, Not Progressing  Intervention: Promote Wound Healing  Recent Flowsheet Documentation  Taken 2/17/2022 0012 by Anay Tsai, RN  Activity Management: activity adjusted per tolerance   Goal Outcome Evaluation:      Patient explained that she is incontinent most of the time due to urgency. She was not agreeable to having a toileting schedule.

## 2022-02-17 NOTE — PROGRESS NOTES
[facility]    Medicine Progress Note - Hospitalist Service    Date of Admission:  2/8/2022     Assessment & Plan   SUMMARY:  46 year old female with recent COVID infection on 12/2021 who received monoclonal antibody at that time, MS, chronic lymphedema, hypertension, depression, NARCISA, PCOS, admitted for fever, headache, increased leg swelling and cough found to have pneumonia, repeat Covid PCR positive again, cellulitis of lower extremity, acute hypoxic respiratory failure, CHF exacerbation, CT chest with PE.    Covid positive  Covid PCR positive on 2/8/2022  Anticipate Covid recovered on 2/18/2022  Patient planning to discharge to home, may need home O2 at the time of discharge    ACTIVE PROBLEM LIST  Acute hypoxic respiratory failure; likely multifactorial etiology-CHF exacerbation, pulmonary embolism, possible bacterial pneumonia, COVID-19 pneumonia, physical deconditioning.    History of NARCISA on home CPAP  --Patient required BiPAP on admission.   --On 2/11, discontinued nighttime BiPAP, resumed home CPAP, tolerating  --On 2/12, oxygen titrated to 2 L.  --On 12/16 briefly weaned off NC O2 but she is needing 5L O2 on CPAP when sleeping. Likely also needs O2 with exercise  --Incentive spirometry, flutter valve.  --Treat underlying cause as mentioned below  --Appreciate pulmonary/cardiology input.     Acute heart failure with preserved EF;  --On admission BNP 2230 and clinically looked volume overload  --Echo reported EF 65%.  Moderately decreased right ventricular systolic function.    --Lasix drip with good response (16L negative balance), now transitioned to IV Lasix push on 2/12, cardiology managing diuretics  --Monitor intake/output, weight, labs closely.  --Appreciated cardiology input.   --now transitioned to oral diuretics     Elevated troponin; likely demand ischemia in the setting of acute diastolic heart failure, pulmonary embolism, COVID-19  --Troponin trending down, echo with  normal EF, no wall motion abn   --Cardiologist recommended no further ischemic evaluation at this time, can consider stress testing later and defer timing to cardiology team.     Acute right upper lobe pulmonary embolism;  --CT chest also reported pulmonary artery hypertension and Right heart dysfunction.  --Echo as mentioned above.  --Bilateral lower extremity US negative for DVT.  --Heparin drip transition to Xarelto, continue.  --Appreciated pulmonary input, recommended 3 months of anticoagulation and repeat TTE in 3 months to reeval RV function.  Follow-up with pulmonologist as an outpatient.    Hx of dysfunctional uterine bleeding  Takes oral progesterone  Hold until finished loading with xarelto, then can resume progesterone  Pulmonology to advise as to long term treatment with progesterone once patient completes xarelto course     Positive COVID-19 test on 12/11/2021 and 2/8/22;  Sepsis likely due to left lower extremity cellulitis;  Positive blood culture; likely contamination  --On admission, CT chest reported new right lung infiltrate compatible with pneumonia.  --On admission, elevated procalcitonin and elevated WBC count.  Normal lactic acid.  --Completed 3 days course of remdesivir per ID recommendation.  --Appreciated ID input, reported fever likely due to LLE cellulitis and less likely bacterial pneumonia.  --On 2/10, IV Vanco discontinued.   --On 2/11 IV Zosyn changed to IV Rocephin.  Appreciated ID input.  --now changed to oral cefuroxime, for 2 more days  --Continue clinical monitoring, lab monitoring  --Wound care nurse on case    Chronic lower extremity lymphedema;  --Initiated lymphedema wrap, continue     Essential hypertension; controlled  --Continue home metoprolol, lisinopril.  Hydralazine as needed     History of MS;  No longer taking medication.  Primarily has left lower extremity weakness and pain.    --PT OT.      Morbid obesity;  Body mass index is 64.51 kg/m .  --Weight reduction  program, defer to primary as an outpatient.     DVT prophylaxis:    Medical:  rivaroxaban (Xarelto)    Mechanical:  Lower extremity condition precludes SCD's    Disposition Plan: Expected discharge: 02/18/2022   recommended to Home once O2 use less than 5 liters/minute and Adequately diuresed.    Diet: Orders Placed This Encounter      2 Gram Sodium Diet    Brar Catheter: Not Present  Central Lines/Port-a-cath: Not present  Drains: Not present    Principal Problem:    Acute respiratory failure with hypoxia (H)  Active Problems:    NACRISA (obstructive sleep apnea)    Cellulitis    Pneumonia due to infectious organism, unspecified laterality, unspecified part of lung    Acute pulmonary embolism without acute cor pulmonale, unspecified pulmonary embolism type (H)    COVID-19    Acute on chronic diastolic congestive heart failure (H)    NSTEMI (non-ST elevated myocardial infarction) (H)    Disposition Plan: Expected discharge: 02/18/2022   recommended to Home once O2 use less than 5 liters/minute.    Diet: Orders Placed This Encounter      2 Gram Sodium Diet      Diet    Brar Catheter: Not present  Central Lines/Port-a-cath: Present  Drains: Not present     --------------------------------------------    The patient's care was discussed with the Patient, Bedside Nurse and Care Coordinator/.    Igor Fernando MD  Hospitalist Service  [facility]  Securely message with the Vocera Web Console (learn more here)  Text page via eshtery Paging/Directory    Clinically Significant Risk Factors Present on Admission             ______________________________________________________________________    Interval History   Patient feeling better, ready to go home. Requesting assistance to set up home support     ROS: Denies chest pain, Moving bowels well, passing urine well, slept well and good appetite    Data personally reviewed from the last 24 hours:   Reviewed telemetry, Laboratory results,  "Consultant recommendations and medications     Physical Exam   /63 (BP Location: Left arm)   Pulse 97   Temp 98.2  F (36.8  C) (Oral)   Resp 20   Ht 1.626 m (5' 4\")   Wt (!) 156 kg (344 lb)   LMP 01/31/2022   SpO2 91%   BMI 59.05 kg/m      Physical Exam    General Appearance:    HEENT:  Awake, Alert, Cooperative, in no distress and appears stated age   Normocephalic, atraumatic, conjunctiva clear without icterus and ears without discharge   Lungs:   Clear to auscultation bilaterally, no wheezing, good air exchange, normal work of breathing   Cardiovascular:  Regular Rate and Rythm, normal apical impulse, normal S1 and S2, 2+ lower extremity edema bilaterally   Abdomen: Soft, non-tender and Non-distended, active bowel sounds   Skin:  Skin color, texture normal and bruising or bleeding. No rashes or lesions over face, neck, arms and legs , turgor normal.   Neurologic:    Neuropsychiatric: Alert & Oriented X 3, Facial symmetry preserved and upper & lower extremities moving well with symmetry  Calm, normal eye contact, Affect normal     Data   Recent Labs   Lab 02/17/22  0538 02/16/22  0614 02/15/22  0519 02/14/22  0510 02/13/22  0555 02/11/22  1315 02/11/22  0502   WBC  --   --   --  7.1  --   --  5.9   HGB  --   --   --  14.1  --   --  12.6   MCV  --   --   --  88  --   --  90   PLT  --   --   --  274  --   --  219   INR  --   --   --   --   --   --  1.23*     --   --  138 139   < > 140   POTASSIUM 3.9  4.0 4.0 4.1 4.0 3.6   < > 3.4*   CHLORIDE 98  --   --  98 96*   < > 99   CO2 27  --   --  28 33*   < > 33*   BUN 17  --   --  15 15   < > 14   CR 0.85  --   --  0.74 0.75   < > 0.86   ANIONGAP 13  --   --  12 10   < > 8   EVITA 9.9  --   --  9.9 9.6   < > 8.5     --   --  99 102   < > 97    < > = values in this interval not displayed.     "

## 2022-02-18 ENCOUNTER — APPOINTMENT (OUTPATIENT)
Dept: OCCUPATIONAL THERAPY | Facility: HOSPITAL | Age: 47
DRG: 871 | End: 2022-02-18
Payer: COMMERCIAL

## 2022-02-18 VITALS
WEIGHT: 293 LBS | SYSTOLIC BLOOD PRESSURE: 122 MMHG | TEMPERATURE: 98 F | OXYGEN SATURATION: 96 % | DIASTOLIC BLOOD PRESSURE: 66 MMHG | RESPIRATION RATE: 18 BRPM | HEIGHT: 64 IN | BODY MASS INDEX: 50.02 KG/M2 | HEART RATE: 102 BPM

## 2022-02-18 LAB
HOLD SPECIMEN: NORMAL
HOLD SPECIMEN: NORMAL
POTASSIUM BLD-SCNC: 4.2 MMOL/L (ref 3.5–5)

## 2022-02-18 PROCEDURE — 250N000013 HC RX MED GY IP 250 OP 250 PS 637: Performed by: INTERNAL MEDICINE

## 2022-02-18 PROCEDURE — 999N000157 HC STATISTIC RCP TIME EA 10 MIN

## 2022-02-18 PROCEDURE — 250N000013 HC RX MED GY IP 250 OP 250 PS 637: Performed by: FAMILY MEDICINE

## 2022-02-18 PROCEDURE — 99239 HOSP IP/OBS DSCHRG MGMT >30: CPT | Performed by: INTERNAL MEDICINE

## 2022-02-18 PROCEDURE — 84132 ASSAY OF SERUM POTASSIUM: CPT | Performed by: FAMILY MEDICINE

## 2022-02-18 PROCEDURE — 36415 COLL VENOUS BLD VENIPUNCTURE: CPT | Performed by: FAMILY MEDICINE

## 2022-02-18 PROCEDURE — 97535 SELF CARE MNGMENT TRAINING: CPT | Mod: GO

## 2022-02-18 RX ORDER — BUMETANIDE 2 MG/1
2 TABLET ORAL
Qty: 60 TABLET | Refills: 0 | Status: SHIPPED | OUTPATIENT
Start: 2022-02-18 | End: 2022-03-09

## 2022-02-18 RX ORDER — LISINOPRIL 10 MG/1
10 TABLET ORAL DAILY
Qty: 30 TABLET | Refills: 0 | Status: SHIPPED | OUTPATIENT
Start: 2022-02-19 | End: 2022-03-29

## 2022-02-18 RX ORDER — BUMETANIDE 1 MG/1
2 TABLET ORAL
Status: DISCONTINUED | OUTPATIENT
Start: 2022-02-18 | End: 2022-02-18 | Stop reason: HOSPADM

## 2022-02-18 RX ADMIN — MAGNESIUM OXIDE TAB 400 MG (241.3 MG ELEMENTAL MG) 400 MG: 400 (241.3 MG) TAB at 09:32

## 2022-02-18 RX ADMIN — GABAPENTIN 200 MG: 100 CAPSULE ORAL at 09:30

## 2022-02-18 RX ADMIN — SERTRALINE HYDROCHLORIDE 100 MG: 50 TABLET ORAL at 09:33

## 2022-02-18 RX ADMIN — BUMETANIDE 2 MG: 1 TABLET ORAL at 09:30

## 2022-02-18 RX ADMIN — METOPROLOL SUCCINATE 25 MG: 25 TABLET, EXTENDED RELEASE ORAL at 09:31

## 2022-02-18 RX ADMIN — MICONAZOLE NITRATE: 20 POWDER TOPICAL at 09:32

## 2022-02-18 RX ADMIN — CEFUROXIME AXETIL 500 MG: 500 TABLET ORAL at 09:30

## 2022-02-18 RX ADMIN — RIVAROXABAN 15 MG: 15 TABLET, FILM COATED ORAL at 09:30

## 2022-02-18 ASSESSMENT — ACTIVITIES OF DAILY LIVING (ADL)
ADLS_ACUITY_SCORE: 23
ADLS_ACUITY_SCORE: 23
ADLS_ACUITY_SCORE: 25
ADLS_ACUITY_SCORE: 25
ADLS_ACUITY_SCORE: 23
ADLS_ACUITY_SCORE: 23
ADLS_ACUITY_SCORE: 25
ADLS_ACUITY_SCORE: 23
ADLS_ACUITY_SCORE: 25

## 2022-02-18 NOTE — PROGRESS NOTES
I certify that this patient, Bess Enamorado has been under my care (or a nurse practitioner or physican's assistant working with me). This is the face-to-face encounter for oxygen medical necessity.      Bess Enamorado is now in a chronic stable state and continues to require supplemental oxygen. Patient has continued oxygen desaturation due to Chronic Heart Failure I50  Chronic Respiratory Failure with Hypoxia J93.11  COVID-19 U07.1  Pulmonary Hypertension I27.20  Morbid obesity with chronic hypoventilation syndrome.  Obstructive Sleep Apnea    Alternative treatment(s) tried or considered and deemed clinically infective for treatment of Chronic Heart Failure I50  Chronic Respiratory Failure with Hypoxia J93.11  Pulmonary Hypertension I27.20 include inhalers, pulmonary toileting and pulmonary rehab.  If portability is ordered, is the patient mobile within the home? yes    **Patients who qualify for home O2 coverage under the CMS guidelines require ABG tests or O2 sat readings obtained closest to, but no earlier than 2 days prior to the discharge, as evidence of the need for home oxygen therapy. Testing must be performed while patient is in the chronic stable state. See notes for O2 sats.**

## 2022-02-18 NOTE — PLAN OF CARE
Problem: Adult Inpatient Plan of Care  Goal: Plan of Care Review  Outcome: Adequate for Care Transition  Goal: Patient-Specific Goal (Individualized)  Outcome: Adequate for Care Transition  Goal: Absence of Hospital-Acquired Illness or Injury  Outcome: Adequate for Care Transition  Intervention: Identify and Manage Fall Risk  Recent Flowsheet Documentation  Taken 2/18/2022 0900 by Katelyn Carrasquillo RN  Safety Promotion/Fall Prevention:    activity supervised    mobility aid in reach    lighting adjusted  Intervention: Prevent and Manage VTE (Venous Thromboembolism) Risk  Recent Flowsheet Documentation  Taken 2/18/2022 0900 by Katelyn Carrasquillo RN  VTE Prevention/Management: (lymphedema wrap in place) compression stockings on  Intervention: Prevent Infection  Recent Flowsheet Documentation  Taken 2/18/2022 0900 by Katelyn Carrasquillo RN  Infection Prevention:    visitors restricted/screened    single patient room provided    personal protective equipment utilized    hand hygiene promoted    equipment surfaces disinfected    environmental surveillance performed  Goal: Optimal Comfort and Wellbeing  Outcome: Adequate for Care Transition  Goal: Readiness for Transition of Care  Outcome: Adequate for Care Transition     Problem: Adjustment to Illness (Sepsis/Septic Shock)  Goal: Optimal Coping  Outcome: Adequate for Care Transition     Problem: Bleeding (Sepsis/Septic Shock)  Goal: Absence of Bleeding  Outcome: Adequate for Care Transition     Problem: Infection Progression (Sepsis)  Goal: Absence of Infection Signs and Symptoms  Outcome: Adequate for Care Transition  Intervention: Initiate Sepsis Management  Recent Flowsheet Documentation  Taken 2/18/2022 0900 by Katelyn Carrasquillo RN  Infection Prevention:    visitors restricted/screened    single patient room provided    personal protective equipment utilized    hand hygiene promoted    equipment surfaces disinfected    environmental surveillance  performed  Isolation Precautions: airborne precautions maintained     Problem: Nutrition Impaired (Sepsis/Septic Shock)  Goal: Optimal Nutrition Intake  Outcome: Adequate for Care Transition     Problem: Adjustment to Illness (Acute Coronary Syndrome)  Goal: Optimal Adaptation to Illness  Outcome: Adequate for Care Transition     Problem: Dysrhythmia (Acute Coronary Syndrome)  Goal: Normalized Cardiac Rhythm  Outcome: Adequate for Care Transition  Intervention: Monitor and Manage Cardiac Rhythm Effect  Recent Flowsheet Documentation  Taken 2/18/2022 0900 by Katelyn Carrasquillo RN  VTE Prevention/Management: (lymphedema wrap in place) compression stockings on     Problem: Cardiac-Related Pain (Acute Coronary Syndrome)  Goal: Absence of Cardiac-Related Pain  Outcome: Adequate for Care Transition     Problem: Hemodynamic Instability (Acute Coronary Syndrome)  Goal: Effective Cardiac Pump Function  Outcome: Adequate for Care Transition     Problem: Tissue Perfusion (Acute Coronary Syndrome)  Goal: Adequate Tissue Perfusion  Outcome: Adequate for Care Transition     Problem: Gas Exchange Impaired  Goal: Optimal Gas Exchange  Outcome: Adequate for Care Transition     Problem: Venous Thromboembolism  Goal: VTE Symptom Resolution  Outcome: Adequate for Care Transition  Intervention: Prevent or Manage VTE (Venous Thromboembolism)  Recent Flowsheet Documentation  Taken 2/18/2022 0900 by Katelyn Carrasquillo RN  VTE Prevention/Management: (lymphedema wrap in place) compression stockings on     Problem: Impaired Wound Healing  Goal: Optimal Wound Healing  Outcome: Adequate for Care Transition   Goal Outcome Evaluation:  Pt says she will make own PMD appointment IV removed Pt is insisting that her friend will come to pick her up and that she is able to go on a wheelchair to the [facility]  And that also she do not have clothing at the bedside her friend will bring this  discharge instructions nd medication changes discussed and  verbalized understanding . Her  Friend came to pick her up  And she was able to get up and sat on the wheelchair and was brought to the Lobby By KRISTIE

## 2022-02-18 NOTE — DISCHARGE INSTRUCTIONS
Pulmonary Medicine  (1) [facility].  Address:  [address], [place] [address].  Phone:  [phone]. CALL TO SCHEDULE A FOLLOW-UP VISIT IN: 3 months.  Please have your heart ultrasound (echocardiogram) done prior to your visit.        Medical Equipment Companies    [employer] [facility] [employer]:  [address], [place] [id number] [phone]    [facility]  [address], [place] [id number] [phone]    [facility]  [address] [place] [address]  [phone]    Oxygen Provider:  Arranged through [facility], contact number [phone].  If you have any questions or concerns please call the oxygen company directly.

## 2022-02-18 NOTE — PLAN OF CARE
[id number]    Problem: Tissue Perfusion (Acute Coronary Syndrome)  Goal: Adequate Tissue Perfusion  Intervention: Optimize Cardiac Tissue Perfusion  Recent Flowsheet Documentation  Taken 2/17/2022 1700 by Jessica Lund, RN  Activity Management: activity adjusted per tolerance  Taken 2/17/2022 1230 by Jessica Lund RN  Activity Management: activity adjusted per tolerance  Taken 2/17/2022 0930 by Jessica Lund RN  Activity Management: activity adjusted per tolerance   Goal Outcome Evaluation:        Pt weaned off O2. Sat 94% RA when pt is 85% is when Pt is sleeping on RA.  Pt up to commode with PT today. Lymph wraps done by OT. Pt following Fluid Restritction. Pt has had an overall good day.  Pt possibly discharging home tomorrow with home care and possible home O2 if she qualifies.

## 2022-02-18 NOTE — PROGRESS NOTES
Pt on home cpap overnight, initially trailed on room air with cpap, but pt unable to maintain SPO2 greater than 89%.  Started O2 bleed with 1 lpm and had to increase to 2 lpm bleed with cpap to maintain SPO2 of 92%.  RT will continue to monitor.

## 2022-02-18 NOTE — PLAN OF CARE
Lymphedema Discharge Summary    Reason for therapy discharge:    Discharged to home with home therapy.    Progress towards therapy goal(s). See goals on Care Plan in [facility] electronic health record for goal details.  Goals met    Therapy recommendation(s):    Continued therapy is recommended.  Rationale/Recommendations:  Recommend home care for continued assist with skin care, pt has velcro/snap compression garments that she will continue to use for swelling.

## 2022-02-18 NOTE — PLAN OF CARE
Problem: Gas Exchange Impaired  Goal: Optimal Gas Exchange  Outcome: Ongoing, Not Progressing     Problem: Adult Inpatient Plan of Care  Goal: Readiness for Transition of Care  Outcome: Ongoing, Progressing   Goal Outcome Evaluation:           Pt. On 2 L oxymask prior to putting on CPAP due to dozing while watching TV. Pt on home cpap overnight, initially trailed on room air with cpap, but pt unable to maintain SPO2 and o2 sats fell to 86% needed 2 L into CPAP to maintain o2 sat above 90%.  Pt. Showed willingness to stick to fluid restriction and participate in care. No complaints of pain overnight and continues to have heavier menses.

## 2022-02-18 NOTE — PROGRESS NOTES
Care Management Discharge Note    Discharge Date: 02/18/2022       Discharge Disposition: Home Care    Discharge Services:  RN PT OT GIGIA ALFRED- [facility]  Discharge DME: Wheelchair, Walker, Shower Chair    Discharge Transportation:  Friend    Private pay costs discussed: Not applicable  Education Provided on the Discharge Plan:    Persons Notified of Discharge Plans: Patient  Patient/Family in Agreement with the Plan: yes    Handoff Referral Completed: Yes    Additional Information:  Chart reviewed. ALFRED discussed updates with MD and pt's bedside RN. Pt planning to discharge today.  ALFRED met with patient in her room to review discharge plan. Pt plans to discharge to her home. Pt states her sister plans to be her PCA for a while upon return home.   Pt states she has been sleeping in a recliner in her home as it is convenient to go to the bathroom during the night. Pt states she has a commode at home.   Patient states she has called her insurance company to inquire about hospital bed and states they informed her they can only cover the cost of a manual hospital bed. Pt states she is hoping her Primary care doctor can assist with medical necessity for electric hospital bed. ALFRED provided medical equipment company contact information in AVS. Pt has a wheelchair and recliner lift chair at home.  [facility] and states she has a call out to the [facility] as they are planning a re-assessment for PCA hours.   Discussed CADI waiver services - pt states understanding of this and will work with the [facility] on assessment if needed. Informed pt CADI waiver can provide a budget for services, equipment and supplies.  Pt states her sister is a therapist and is a good advocate and support in following up on services and assistance she can get into her home.  ALFRED notified [facility] of pt's discharge.   2:35 PM Addendum:  Spoke to patient to confirm transportation, friend is planning to  transport. Patient states she feels confident in pivoting into her vehicle. PT notes on 2/17/22 also indicate pt is confident in returning home with home care services.     Alicia Meyer LGSW

## 2022-02-18 NOTE — DISCHARGE SUMMARY
[facility]  Hospitalist Discharge Summary      Date of Admission:  2/8/2022  Date of Discharge:  2/18/2022  Discharging Provider: Igor Fernando MD  Discharge Service: Hospitalist Service    SUMMARY:  46 year old female with recent COVID infection on 12/2021 who received monoclonal antibody at that time, MS, chronic lymphedema, hypertension, depression, NARCISA, PCOS, admitted for fever, headache, increased leg swelling and cough found to have pneumonia, repeat Covid PCR positive again, cellulitis of lower extremity, acute hypoxic respiratory failure, CHF exacerbation, CT chest with PE.    Discharge Diagnoses   Acute hypoxic respiratory failure; likely multifactorial etiology-CHF exacerbation, pulmonary embolism, possible bacterial pneumonia, COVID-19 pneumonia, physical deconditioning.    History of NARCISA on home CPAP  --Patient required BiPAP on admission.   --On 2/11, discontinued nighttime BiPAP, resumed home CPAP, tolerating  --On 2/12, oxygen titrated to 2 L.  --On 12/16 briefly weaned off NC O2 but she is needing 5L O2 on CPAP when sleeping. Also needs O2 with exercise  --Incentive spirometry, flutter valve.  --Treat underlying cause as mentioned below  --Appreciate pulmonary/cardiology input.  --Prescribed home O2     Acute heart failure with preserved EF;  --On admission BNP 2230 and clinically looked volume overload  --Echo reported EF 65%.  Moderately decreased right ventricular systolic function.    --Lasix drip with good response (16L negative balance), now transitioned to IV Lasix push on 2/12, cardiology managing diuretics  --Monitor intake/output, weight, labs closely.  --Appreciated cardiology input.   --transitioned to oral diuretics     Elevated troponin; likely demand ischemia in the setting of acute diastolic heart failure, pulmonary embolism, COVID-19  --Troponin trending down, echo with normal EF, no wall motion abn   --Cardiologist recommended no further ischemic evaluation  at this time, can consider stress testing later and defer timing to cardiology team.     Acute right upper lobe pulmonary embolism;  --CT chest also reported pulmonary artery hypertension and Right heart dysfunction.  --Echo as mentioned above.  --Bilateral lower extremity US negative for DVT.  --Heparin drip transition to Xarelto, continue.  --Appreciated pulmonary input, recommended 3 months of anticoagulation and repeat TTE in 3 months to reeval RV function.  Follow-up with pulmonologist as an outpatient.     Hx of dysfunctional uterine bleeding  Takes oral progesterone  Hold until finished loading with xarelto, then can resume progesterone  Pulmonology to advise as to long term treatment with progesterone once patient completes xarelto course     Positive COVID-19 test on 12/11/2021 and 2/8/22;  Sepsis likely due to left lower extremity cellulitis;  Positive blood culture; likely contamination  --On admission, CT chest reported new right lung infiltrate compatible with pneumonia.  --On admission, elevated procalcitonin and elevated WBC count.  Normal lactic acid.  --Completed 3 days course of remdesivir per ID recommendation.  --Appreciated ID input, reported fever likely due to LLE cellulitis and less likely bacterial pneumonia.  --On 2/10, IV Vanco discontinued.   --On 2/11 IV Zosyn changed to IV Rocephin.  Appreciated ID input.  --now changed to oral cefuroxime, for 2 more days  --Continue clinical monitoring, lab monitoring  --Wound care nurse on case     Chronic lower extremity lymphedema;  --Initiated lymphedema wrap, continue     Essential hypertension; controlled  --Continue home metoprolol, lisinopril.  Hydralazine as needed     History of MS;  No longer taking medication.  Primarily has left lower extremity weakness and pain.    --PT OT.      Large benign left ovarian dermoid  As seen on Abd/pelvic CT Oct 2018 - 10.1cmx 8.6cm x7.7cm    Morbid obesity;  Body mass index is 64.51 kg/m .  --Weight  reduction program, defer to primary as an outpatient.    Follow-ups Needed After Discharge   Follow-up Appointments     Follow-up and recommended labs and tests       Follow up with primary care provider, Mikala Luna, within 7 days to   evaluate medication change and for hospital follow- up.  The following   labs/tests are recommended: BMP.           Discharge Disposition   Discharged to home  Condition at discharge: Fair    Consultations This Hospital Stay   PHARMACY TO DOSE VANCO  PHARMACY IP CONSULT  PHARMACY IP CONSULT  INFECTIOUS DISEASES IP CONSULT  CARDIOLOGY IP CONSULT  SPEECH LANGUAGE PATH ADULT IP CONSULT  PHYSICAL THERAPY ADULT IP CONSULT  OCCUPATIONAL THERAPY ADULT IP CONSULT  WOUND OSTOMY CONTINENCE NURSE  IP CONSULT  PHARMACY TO DOSE VANCO  PHYSICAL THERAPY ADULT IP CONSULT  OCCUPATIONAL THERAPY ADULT IP CONSULT  SOCIAL WORK IP CONSULT  PULMONARY IP CONSULT  PHARMACY IP CONSULT  LYMPHEDEMA THERAPY IP CONSULT  PHARMACY IP CONSULT  PHARMACY IP CONSULT  INFECTION PREVENTION IP CONSULT    Code Status   Full Code    Time Spent on this Encounter   I, Igor Fernando MD, personally saw the patient today and spent greater than 30 minutes discharging this patient.       Igor Fernando MD  [facility] [address] [address]  Phone: [phone]  Fax: [phone]  ______________________________________________________________________    Physical Exam   Vital Signs: Temp: 98.2  F (36.8  C) Temp src: Oral BP: 109/71 Pulse: 91   Resp: 18 SpO2: 94 % O2 Device: None (Room air) Oxygen Delivery: 2 LPM  Weight: 352 lbs 0 oz  Constitutional: awake, alert, cooperative, no apparent distress, and appears stated age  ENT: normocepalic, without obvious abnormality, atramatic  Respiratory: No increased work of breathing, good air exchange, clear to auscultation bilaterally, no crackles or wheezing  Cardiovascular: normal apical pulses  and normal S1 and S2  GI: normal  bowel sounds, soft and non-distended  Neurologic: Mental Status Exam:  Level of Alertness:   awake  Orientation:   person, place, time  Memory:   normal  Motor Exam:  moves all extremities well and symmetrically  Sensory:  Sensory intact       Primary Care Physician   Mikala Luna    Discharge Orders      Adult Pulmonary Medicine Referral      Medication Therapy Management Referral      Home Care Referral      Reason for your hospital stay    Shortness of breath, weakness, Covid-19 positive     Follow-up and recommended labs and tests     Follow up with primary care provider, Mikala Luna, within 7 days to evaluate medication change and for hospital follow- up.  The following labs/tests are recommended: BMP.     Activity    Your activity upon discharge: activity as tolerated     Hospital Bed Order for DME - ONLY FOR DME    I, the undersigned, certify that the above prescribed supplies are medically necessary for this patient and is both reasonable and necessary in reference to accepted standards of medical and necessary in reference to accepted standards of medical practice in the treatment of this patient's condition and is not prescribed as a convenience.      Home Oxygen Order for DME - ONLY FOR DME    I, the undersigned, certify that the above prescribed supplies are medically necessary for this patient and is both reasonable and necessary in reference to accepted standards of medical and necessary in reference to accepted standards of medical practice in the treatment of this patient's condition and is not prescribed as a convenience.      Diet    Follow this diet upon discharge: Orders Placed This Encounter      Fluid restriction 1500 ML FLUID      2 Gram Sodium Diet       Significant Results and Procedures   Most Recent 3 CBC's:Recent Labs   Lab Test 02/14/22  0510 02/11/22  0502 02/10/22  0533   WBC 7.1 5.9 7.0   HGB 14.1 12.6 11.8   MCV 88 90 90    219 213     Most Recent 3 BMP's:Recent Labs    Lab Test 02/18/22  0555 02/17/22  0538 02/16/22  0614 02/15/22  0519 02/14/22  0510 02/13/22  0555   NA  --  138  --   --  138 139   POTASSIUM 4.2 3.9  4.0 4.0   < > 4.0 3.6   CHLORIDE  --  98  --   --  98 96*   CO2  --  27  --   --  28 33*   BUN  --  17  --   --  15 15   CR  --  0.85  --   --  0.74 0.75   ANIONGAP  --  13  --   --  12 10   EVITA  --  9.9  --   --  9.9 9.6   GLC  --  102  --   --  99 102    < > = values in this interval not displayed.     Most Recent 2 LFT's:Recent Labs   Lab Test 02/08/22  1115 03/07/21  0600   AST 18 18   ALT 12 17   ALKPHOS 40* 57   BILITOTAL 2.3* 1.1     Most Recent 3 INR's:Recent Labs   Lab Test 02/11/22  0502   INR 1.23*   ,   Results for orders placed or performed during the hospital encounter of 02/08/22   CT Chest Pulmonary Embolism w Contrast    Narrative    EXAM: CT CHEST PULMONARY EMBOLISM W CONTRAST  LOCATION: [facility]  DATE/TIME: 2/8/2022 3:03 PM    INDICATION: COVID. Dyspnea. Fever. Weakness.  COMPARISON: 03/05/2021 CT  TECHNIQUE: CT chest pulmonary angiogram during arterial phase injection of IV contrast. Multiplanar reformats and MIP reconstructions were performed. Dose reduction techniques were used.   CONTRAST: isovue 370 100ml    FINDINGS:  ANGIOGRAM CHEST: Enlarged main pulmonary artery is 4 cm diameter consistent with pulmonary arterial hypertension. Small nonocclusive thrombus right upper lobe pulmonary artery (series 5, image 66). No additional thrombi seen.    Thoracic aorta is negative for dissection. RV/LV ratio abnormal, greater than 1.0.    LUNGS AND PLEURA: Diffuse lung mosaic attenuation, similar to previous which can be seen with pulmonary arterial hypertension. No right upper and middle lobe groundglass/consolidative infiltrates suggesting an acute pneumonia. No pleural effusion.    MEDIASTINUM/AXILLAE: No adenopathy.    CORONARY ARTERY CALCIFICATION: None.    UPPER ABDOMEN: Absent gallbladder    MUSCULOSKELETAL: Upper  chest wall venous collaterals. Spinal degenerative change.      Impression    IMPRESSION:  1.  Small nonocclusive right upper lobe pulmonary embolism.  2.  Pulmonary arterial hypertension.  3.  Right heart dysfunction.  4.  New right lung infiltrates compatible with pneumonia.  5.  Report called to Dr. Guillen at 3:30 PM.   US Lower Extremity Venous Duplex Bilateral    Narrative    EXAM: US LOWER EXTREMITY VENOUS DUPLEX BILATERAL  LOCATION: [facility]  DATE/TIME: 2/8/2022 5:04 PM    INDICATION: PE on CT, concern for DVT.  COMPARISON: None.  TECHNIQUE: Venous duplex ultrasound of bilateral lower extremities with and without compression, augmentation and duplex. Color flow and spectral Doppler with waveform analysis performed.    FINDINGS: Exam includes the common femoral, femoral, popliteal veins as well as segmentally visualized deep calf veins and greater saphenous vein.     RIGHT: No deep vein thrombosis. No superficial thrombophlebitis. No popliteal cyst.    LEFT: No deep vein thrombosis. No superficial thrombophlebitis. No popliteal cyst.      Impression    IMPRESSION: No deep venous thrombosis in the bilateral lower extremities. Visualization of the veins is technically limited by body habitus.   CT Head w/o Contrast    Narrative    EXAM: CT HEAD W/O CONTRAST  LOCATION: [facility]  DATE/TIME: 2/9/2022 10:32 PM    INDICATION: Fall with headache.  COMPARISON: None.  TECHNIQUE: Routine CT Head without IV contrast. Multiplanar reformats. Dose reduction techniques were used.    FINDINGS:  INTRACRANIAL CONTENTS: No intracranial hemorrhage, extraaxial collection, or mass effect.  No CT evidence of acute infarct. There are a few tiny foci of low-attenuations primarily posterior to the lateral ventricles which most likely represent prominent   perivascular spaces. Normal ventricles and sulci.     VISUALIZED ORBITS/SINUSES/MASTOIDS: No intraorbital abnormality. No paranasal  sinus mucosal disease. No middle ear or mastoid effusion.    BONES/SOFT TISSUES: No acute abnormality.      Impression    IMPRESSION:  1.  No CT finding of a mass, hemorrhage or focal area suggestive of acute infarct   Echocardiogram Complete     Value    LVEF  > 65%    [facility]    [id number]  [id number]  [id number]  [id number]^TIANA^JORJE^[place] [address]     Name: CITLALY MICHEL  MRN: [id number]  : 1975  Study Date: 2022 09:28 AM  Age: 46 yrs  Gender: Female  Patient Location: [facility]  Reason For Study: CHF  Ordering Physician: JORJE MONTIEL  Performed By:      BSA: 2.8 m2  Height: 64 in  Weight: 480 lb  HR: 97  ______________________________________________________________________________  Procedure  Complete Portable Echo Adult. Definity (NDC #89535-301) given intravenously.  No hemodynamically significant valvular abnormalities on 2D or color flow  imaging. Technically difficult, suboptimal study. There is no comparison study  available.  ______________________________________________________________________________  Interpretation Summary     1.Left ventricular size, wall motion and function are normal. The ejection  fraction is > 65%.  2.Moderately decreased right ventricular systolic function  3.No hemodynamically significant valvular abnormalities on 2D or color flow  imaging.  4.Technically difficult, suboptimal study due to patient size.  There is no comparison study available.  ______________________________________________________________________________  I      WMSI = 1.00     % Normal = 100     X - Cannot   0 -                      (2) - Mildly 2 -          Segments  Size  Interpret    Hyperkinetic 1 - Normal  Hypokinetic  Hypokinetic  1-2     small                                                     7 -          3-5      moderate  3 - Akinetic 4 -          5 -         6 - Akinetic Dyskinetic   6-14    large               Dyskinetic    Aneurysmal  w/scar       w/scar       15-16   diffuse     Left Ventricle  Left ventricular size, wall motion and function are normal. The ejection  fraction is > 65%. There is normal left ventricular wall thickness. Left  ventricular diastolic function is normal. No regional wall motion  abnormalities noted.     Right Ventricle  The right ventricle is mildly dilated. Moderately decreased right ventricular  systolic function. The right ventricle is not well visualized.     Atria  The left atrium is not well visualized. The left atrium is mildly dilated.  Right atrial size is normal. There is no color Doppler evidence of an atrial  shunt.     Mitral Valve  The mitral valve is not well visualized. There is no mitral regurgitation  noted. There is no mitral valve stenosis.     Tricuspid Valve  The tricuspid valve is not well visualized, but is grossly normal. The  tricuspid valve is not well visualized. Right ventricle systolic pressure  estimate normal. There is physiologic tricuspid regurgitation. There is no  tricuspid stenosis.     Aortic Valve  Aortic valve leaflets appear normal. There is no evidence of aortic stenosis  or clinically significant aortic regurgitation. The aortic valve is not well  visualized. No aortic regurgitation is present. No aortic stenosis is present.     Pulmonic Valve  The pulmonic valve is not well seen, but is grossly normal. The pulmonic valve  is not well visualized. This degree of valvular regurgitation is within normal  limits. There is no pulmonic valvular stenosis.     Vessels  The aorta root is normal. Normal size ascending aorta. IVC diameter <2.1 cm  collapsing >50% with sniff suggests a normal RA pressure of 3 mmHg.     Pericardium  There is no pericardial effusion.     Rhythm  Sinus rhythm was noted.     ______________________________________________________________________________  MMode/2D Measurements & Calculations  Ao root diam: 3.1 cm  asc Aorta Diam: 3.6 cm     LVOT  diam: 2.4 cm  LVOT area: 4.7 cm2     Doppler Measurements & Calculations  MV E max jim: 96.7 cm/sec  MV A max jim: 121.8 cm/sec  MV E/A: 0.79  MV dec slope: 538.6 cm/sec2  MV dec time: 0.18 sec  Ao V2 max: 138.1 cm/sec  Ao max P.0 mmHg  Ao V2 mean: 87.3 cm/sec  Ao mean PG: 3.7 mmHg  Ao V2 VTI: 25.2 cm  SHANELL(I,D): 4.1 cm2  SHANELL(V,D): 4.4 cm2  LV V1 max P.8 mmHg  LV V1 max: 130.4 cm/sec  LV V1 VTI: 22.0 cm  SV(LVOT): 103.6 ml  SI(LVOT): 36.5 ml/m2  AV Jim Ratio (DI): 0.94  SHANELL Index (cm2/m2): 1.4  E/E' av.8  Lateral E/e': 18.5  Medial E/e': 15.1     ______________________________________________________________________________  Report approved by: Lian Khoury 2022 10:51 AM               Discharge Medications   Current Discharge Medication List      START taking these medications    Details   bumetanide (BUMEX) 2 MG tablet Take 1 tablet (2 mg) by mouth 2 times daily  Qty: 60 tablet, Refills: 0    Associated Diagnoses: Acute on chronic diastolic congestive heart failure (H)      cefuroxime (CEFTIN) 500 MG tablet Take 1 tablet (500 mg) by mouth every 12 hours  Qty: 10 tablet, Refills: 0    Associated Diagnoses: Cellulitis of right leg; Lymphedema of both lower extremities      magnesium oxide (MAG-OX) 400 MG tablet Take 1 tablet (400 mg) by mouth 2 times daily  Qty: 4 tablet, Refills: 0    Associated Diagnoses: Acute on chronic diastolic congestive heart failure (H)      !! rivaroxaban ANTICOAGULANT (XARELTO) 15 MG TABS tablet Take 1 tablet (15 mg) by mouth 2 times daily (with meals) for 15 days  Qty: 30 tablet, Refills: 0    Associated Diagnoses: Acute pulmonary embolism without acute cor pulmonale, unspecified pulmonary embolism type (H)      !! rivaroxaban ANTICOAGULANT (XARELTO) 20 MG TABS tablet Take 1 tablet (20 mg) by mouth daily (with dinner)  Qty: 30 tablet, Refills: 0    Associated Diagnoses: Acute pulmonary embolism without acute cor pulmonale, unspecified pulmonary embolism type (H)        !! - Potential duplicate medications found. Please discuss with provider.      CONTINUE these medications which have CHANGED    Details   desogestrel-ethinyl estradiol (APRI) 0.15-30 MG-MCG tablet TAKE 1 TABLET DAILY CONTINUOUSLY-OMIT PLACEBPO TABS UNTIL AFTER 3 MONTHS OF HORMONE TABS  Qty: 112 tablet, Refills: 3    Associated Diagnoses: Polycystic ovaries; Encounter for surveillance of contraceptive pills      lisinopril (ZESTRIL) 10 MG tablet Take 1 tablet (10 mg) by mouth daily  Qty: 30 tablet, Refills: 0    Associated Diagnoses: Acute on chronic diastolic congestive heart failure (H)         CONTINUE these medications which have NOT CHANGED    Details   acetaminophen (TYLENOL) 325 MG tablet Take 650 mg by mouth every 6 hours as needed for mild pain       Cholecalciferol (VITAMIN D3 PO) Take 10,000 Units by mouth daily       gabapentin (NEURONTIN) 100 MG capsule TAKE 2 CAPSULES(200 MG) BY MOUTH THREE TIMES DAILY  Qty: 540 capsule, Refills: 1    Comments: ZERO refills remain on this prescription. Your patient is requesting advance approval of refills for this medication to PREVENT ANY MISSED DOSES  Associated Diagnoses: Multiple sclerosis (H)      metoprolol succinate ER (TOPROL-XL) 25 MG 24 hr tablet TAKE 1 TABLET(25 MG) BY MOUTH DAILY  Qty: 90 tablet, Refills: 1    Comments: ZERO refills remain on this prescription. Your patient is requesting advance approval of refills for this medication to PREVENT ANY MISSED DOSES  Associated Diagnoses: Benign essential hypertension; Sinus tachycardia      miconazole (MICATIN) 2 % external powder Apply topically 2 times daily  Qty:      Associated Diagnoses: Morbid obesity (H)      sertraline (ZOLOFT) 100 MG tablet Take 1 tablet (100 mg) by mouth daily  Qty: 90 tablet, Refills: 3    Associated Diagnoses: Moderate episode of recurrent major depressive disorder (H); Generalized anxiety disorder           Allergies   Allergies   Allergen Reactions     Nkda [No Known Drug  Allergies]

## 2022-02-18 NOTE — PLAN OF CARE
Occupational Therapy Discharge Summary    Reason for therapy discharge:    Discharged home.    Progress towards therapy goal(s). See goals on Care Plan in [facility] electronic health record for goal details.  Initial goals met.     Therapy recommendation(s):    Recommendation had been for TCU; pt declined & discharged home. Pt could benefit then from home OT to further progress ADLs/trfs/activity tolerance. Per CM, pt does have current PCA hrs.

## 2022-02-18 NOTE — PLAN OF CARE
Physical Therapy Discharge Summary    Reason for therapy discharge:    Discharged to home with home therapy.    Progress towards therapy goal(s). See goals on Care Plan in [facility] electronic health record for goal details.  Goals partially met.  Barriers to achieving goals:   discharge from facility.    Therapy recommendation(s):    Continued therapy is recommended.  Rationale/Recommendations:  to progress with strengthening and activity tolerance..

## 2022-02-18 NOTE — PROGRESS NOTES
Received intake call for home oxygen at 10:30AM. Reviewed patient's chart; Patient qualifies under relaxed insurance guidelines and all documentation is in the chart including a good order.   11:00am- Spoke with floor nurse, confirmed we received the order, and provided them with ETA of oxygen.

## 2022-02-20 ENCOUNTER — HOSPITAL ENCOUNTER (INPATIENT)
Facility: HOSPITAL | Age: 47
LOS: 6 days | Discharge: HOME HEALTH | DRG: 853 | End: 2022-02-27
Attending: EMERGENCY MEDICINE | Admitting: STUDENT IN AN ORGANIZED HEALTH CARE EDUCATION/TRAINING PROGRAM
Payer: COMMERCIAL

## 2022-02-20 ENCOUNTER — NURSE TRIAGE (OUTPATIENT)
Dept: NURSING | Facility: CLINIC | Age: 47
End: 2022-02-20

## 2022-02-20 ENCOUNTER — APPOINTMENT (OUTPATIENT)
Dept: RADIOLOGY | Facility: HOSPITAL | Age: 47
DRG: 853 | End: 2022-02-20
Attending: EMERGENCY MEDICINE
Payer: COMMERCIAL

## 2022-02-20 ENCOUNTER — MEDICAL CORRESPONDENCE (OUTPATIENT)
Dept: HEALTH INFORMATION MANAGEMENT | Facility: CLINIC | Age: 47
End: 2022-02-20

## 2022-02-20 DIAGNOSIS — L03.115 CELLULITIS OF RIGHT LEG: ICD-10-CM

## 2022-02-20 DIAGNOSIS — I89.0 LYMPHEDEMA OF BOTH LOWER EXTREMITIES: ICD-10-CM

## 2022-02-20 DIAGNOSIS — N17.9 ACUTE RENAL FAILURE, UNSPECIFIED ACUTE RENAL FAILURE TYPE (H): ICD-10-CM

## 2022-02-20 DIAGNOSIS — E87.5 HYPERKALEMIA: ICD-10-CM

## 2022-02-20 DIAGNOSIS — N13.2 HYDRONEPHROSIS WITH URINARY OBSTRUCTION DUE TO URETERAL CALCULUS: ICD-10-CM

## 2022-02-20 DIAGNOSIS — E86.1 HYPOTENSION DUE TO HYPOVOLEMIA: ICD-10-CM

## 2022-02-20 DIAGNOSIS — I26.99 ACUTE PULMONARY EMBOLISM WITHOUT ACUTE COR PULMONALE, UNSPECIFIED PULMONARY EMBOLISM TYPE (H): ICD-10-CM

## 2022-02-20 DIAGNOSIS — N20.1 URETERAL STONE: Primary | ICD-10-CM

## 2022-02-20 DIAGNOSIS — E86.0 DEHYDRATION: ICD-10-CM

## 2022-02-20 LAB
ALBUMIN SERPL-MCNC: 3.7 G/DL (ref 3.5–5)
ALP SERPL-CCNC: 66 U/L (ref 45–120)
ALT SERPL W P-5'-P-CCNC: 19 U/L (ref 0–45)
ANION GAP SERPL CALCULATED.3IONS-SCNC: 21 MMOL/L (ref 5–18)
AST SERPL W P-5'-P-CCNC: 31 U/L (ref 0–40)
BASOPHILS # BLD AUTO: 0.1 10E3/UL (ref 0–0.2)
BASOPHILS NFR BLD AUTO: 1 %
BILIRUB DIRECT SERPL-MCNC: 0.5 MG/DL
BILIRUB SERPL-MCNC: 1.8 MG/DL (ref 0–1)
BNP SERPL-MCNC: 64 PG/ML (ref 0–67)
BUN SERPL-MCNC: 47 MG/DL (ref 8–22)
CALCIUM SERPL-MCNC: 11 MG/DL (ref 8.5–10.5)
CHLORIDE BLD-SCNC: 92 MMOL/L (ref 98–107)
CO2 SERPL-SCNC: 21 MMOL/L (ref 22–31)
CREAT SERPL-MCNC: 4.46 MG/DL (ref 0.6–1.1)
EOSINOPHIL # BLD AUTO: 0.2 10E3/UL (ref 0–0.7)
EOSINOPHIL NFR BLD AUTO: 1 %
ERYTHROCYTE [DISTWIDTH] IN BLOOD BY AUTOMATED COUNT: 15.7 % (ref 10–15)
GFR SERPL CREATININE-BSD FRML MDRD: 12 ML/MIN/1.73M2
GLUCOSE BLD-MCNC: 97 MG/DL (ref 70–125)
HCT VFR BLD AUTO: 51.4 % (ref 35–47)
HGB BLD-MCNC: 15.5 G/DL (ref 11.7–15.7)
IMM GRANULOCYTES # BLD: 0.1 10E3/UL
IMM GRANULOCYTES NFR BLD: 1 %
LACTATE SERPL-SCNC: 1.8 MMOL/L (ref 0.7–2)
LYMPHOCYTES # BLD AUTO: 2 10E3/UL (ref 0.8–5.3)
LYMPHOCYTES NFR BLD AUTO: 14 %
MAGNESIUM SERPL-MCNC: 2.4 MG/DL (ref 1.8–2.6)
MCH RBC QN AUTO: 26.9 PG (ref 26.5–33)
MCHC RBC AUTO-ENTMCNC: 30.2 G/DL (ref 31.5–36.5)
MCV RBC AUTO: 89 FL (ref 78–100)
MONOCYTES # BLD AUTO: 1.1 10E3/UL (ref 0–1.3)
MONOCYTES NFR BLD AUTO: 8 %
NEUTROPHILS # BLD AUTO: 10.8 10E3/UL (ref 1.6–8.3)
NEUTROPHILS NFR BLD AUTO: 75 %
NRBC # BLD AUTO: 0 10E3/UL
NRBC BLD AUTO-RTO: 0 /100
PLATELET # BLD AUTO: 322 10E3/UL (ref 150–450)
POTASSIUM BLD-SCNC: 5.6 MMOL/L (ref 3.5–5)
PROT SERPL-MCNC: 8.1 G/DL (ref 6–8)
RBC # BLD AUTO: 5.77 10E6/UL (ref 3.8–5.2)
SODIUM SERPL-SCNC: 134 MMOL/L (ref 136–145)
TROPONIN I SERPL-MCNC: 0.05 NG/ML (ref 0–0.29)
WBC # BLD AUTO: 14.1 10E3/UL (ref 4–11)

## 2022-02-20 PROCEDURE — 96361 HYDRATE IV INFUSION ADD-ON: CPT

## 2022-02-20 PROCEDURE — 87040 BLOOD CULTURE FOR BACTERIA: CPT | Performed by: EMERGENCY MEDICINE

## 2022-02-20 PROCEDURE — 85025 COMPLETE CBC W/AUTO DIFF WBC: CPT | Performed by: EMERGENCY MEDICINE

## 2022-02-20 PROCEDURE — 83735 ASSAY OF MAGNESIUM: CPT | Performed by: EMERGENCY MEDICINE

## 2022-02-20 PROCEDURE — 36415 COLL VENOUS BLD VENIPUNCTURE: CPT | Performed by: EMERGENCY MEDICINE

## 2022-02-20 PROCEDURE — 99223 1ST HOSP IP/OBS HIGH 75: CPT | Performed by: STUDENT IN AN ORGANIZED HEALTH CARE EDUCATION/TRAINING PROGRAM

## 2022-02-20 PROCEDURE — 83880 ASSAY OF NATRIURETIC PEPTIDE: CPT | Performed by: EMERGENCY MEDICINE

## 2022-02-20 PROCEDURE — 84484 ASSAY OF TROPONIN QUANT: CPT | Performed by: EMERGENCY MEDICINE

## 2022-02-20 PROCEDURE — 83605 ASSAY OF LACTIC ACID: CPT | Performed by: EMERGENCY MEDICINE

## 2022-02-20 PROCEDURE — 82374 ASSAY BLOOD CARBON DIOXIDE: CPT | Performed by: EMERGENCY MEDICINE

## 2022-02-20 PROCEDURE — 258N000003 HC RX IP 258 OP 636: Performed by: EMERGENCY MEDICINE

## 2022-02-20 PROCEDURE — 82248 BILIRUBIN DIRECT: CPT | Performed by: STUDENT IN AN ORGANIZED HEALTH CARE EDUCATION/TRAINING PROGRAM

## 2022-02-20 PROCEDURE — 99291 CRITICAL CARE FIRST HOUR: CPT | Mod: 25

## 2022-02-20 PROCEDURE — 71045 X-RAY EXAM CHEST 1 VIEW: CPT

## 2022-02-20 PROCEDURE — 93005 ELECTROCARDIOGRAM TRACING: CPT | Performed by: EMERGENCY MEDICINE

## 2022-02-20 RX ORDER — LIDOCAINE 40 MG/G
CREAM TOPICAL
Status: ACTIVE | OUTPATIENT
Start: 2022-02-20 | End: 2022-02-23

## 2022-02-20 RX ADMIN — SODIUM CHLORIDE 1000 ML: 9 INJECTION, SOLUTION INTRAVENOUS at 23:05

## 2022-02-20 NOTE — TELEPHONE ENCOUNTER
KINDRA Yoo, [facility]. Did an assessment. She hasn't urinated since yesterday on 1500 ml restriction and didn't drink much of that yesterday. Swelling in left lower leg, lymphedema is a chronic issue for her. She was in the hospital for ten days. She's been more active since being home and is more tired because of it. She has a follow up on Wednesday with Dr Luna. She was prescribed 15 mg xarelto twice a day for 15 days. On March 4th to change to 20 mg once a day. However, when she left the hospital they only gave her 20 mg so she's taking that twice a day. The pills are not easily cut. She said she is on her period so it didn't sound like there is anything of concern with that. RN reporting this as well.     Also, RN wants to do requesting orders for home care.  Weekly skilled nurse visits for 9 weeks. PT for evaluation and treat. OT evaluation and treat. Home Health Aide 3 times a week for four weeks.    The Home Care/Assisted Living/Nursing Facility is calling regarding an established patient.  Has the patient seen Home Care in the past or is currently residing in Assisted Living or Nursing Facility? Yes.     Naila calling from [facility] requesting the following orders that are within the Home Care, Assisted Living or Nursing Home Eval and Treatment standing order and can be signed as standing order signature required by RN.    Preferred Call Back Number: [phone]    Home Care Visits Continuation    Any additional Orders:  Any order requested not stated above are outside of the standing order and must be routed to a licensed practitioner for approval.    Yes: Weekly skilled nurse visits for 9 weeks. PT for evaluation and treat. OT evaluation and treat. Home Health Aide 3 times a week for four weeks.      Writer has verified Requestor will send fax to have orders signed.  Lanette Robins RN  [facility]      Reason for Disposition    [1] MILD swelling of both ankles (i.e., pedal  edema) AND [2] is a chronic symptom (recurrent or ongoing AND present > 4 weeks)    Additional Information    Negative: Severe difficulty breathing (e.g., struggling for each breath, speaks in single words)    Negative: Looks like a broken bone or dislocated joint (e.g., crooked or deformed)    Negative: Sounds like a life-threatening emergency to the triager    Negative: Chest pain    Negative: Followed a leg injury    Negative: [1] Small area of swelling AND [2] followed an insect bite to the area    Negative: Swelling of one ankle joint    Negative: Swelling of knee is main symptom    Negative: Pregnant    Negative: Postpartum (from 0 to 6 weeks after delivery)    Negative: Difficulty breathing at rest    Negative: Entire foot is cool or blue in comparison to other side    Negative: [1] Can't walk or can barely walk AND [2] new onset    Negative: [1] Difficulty breathing with exertion (e.g., walking) AND [2] new onset or worsening    Negative: [1] Red area or streak AND [2] fever    Negative: [1] Swelling is painful to touch AND [2] fever    Negative: [1] Cast on leg or ankle AND [2] now increased pain    Negative: Patient sounds very sick or weak to the triager     Tempo 96.2 102/64, 66 heart rate    Negative: SEVERE leg swelling (e.g., swelling extends above knee, entire leg is swollen, weeping fluid)     They don't offer lymphedema services. She has compression socks and told to wear them it'd help.    Negative: [1] Red area or streak [2] large (> 2 in. or 5 cm)    Negative: [1] Thigh or calf pain AND [2] only 1 side AND [3] present > 1 hour    Negative: [1] Thigh, calf, or ankle swelling AND [2] only 1 side    Negative: [1] Thigh, calf, or ankle swelling AND [2] bilateral AND [3] 1 side is more swollen    Negative: [1] MODERATE leg swelling (e.g., swelling extends up to knees) AND [2] new onset or worsening    Negative: Swelling of face, arm or hands  (Exception: slight puffiness of fingers occurring during  hot weather)    Negative: Looks like a boil, infected sore, deep ulcer or other infected rash (spreading redness, pus)    Negative: [1] MILD swelling of both ankles (i.e., pedal edema) AND [2] new onset or worsening    Negative: [1] MILD swelling of both ankles (i.e., pedal edema) AND [2] varicose veins    Negative: [1] MILD swelling of both ankles (i.e., pedal edema) AND [2] caused by hot weather    Protocols used: LEG SWELLING AND EDEMA-A-AH

## 2022-02-20 NOTE — TELEPHONE ENCOUNTER
Pt discharged from a 10 day hospital admission 2 days ago, pt was admitted for SOB and weakness.   Pt's catheter was removed prior to discharge.      She is calling today to report the following symptoms:   No urine since Saturday AM  Worsening thigh and abdominal swelling  Low BP of 102/64    Pt denies SOB, fever.      Fluid intake:  40oz yesterday  25 oz today    Triage disposition:  ED or PCP triage.  RN will page the provider on-call to discuss.   04:20PM:  Smart Web used to page on-call Dr. Rivera.   04:22PM:  Dr. Peterson called, advised drinking 500cc of fluid now, if no urine in 4 hours, go to ED.  If able to urinate, contact PCP in the AM to discuss.     04:28PM:  RN called pt back and gave her the information.  Also advised her to call back with any new or worsening symptoms.  RN will route message to PCP clinic to address f/u with pt.  Patient verbalized understanding and had no further questions.      COVID 19 Nurse Triage Plan/Patient Instructions    Please be aware that novel coronavirus (COVID-19) may be circulating in the community. If you develop symptoms such as fever, cough, or SOB or if you have concerns about the presence of another infection including coronavirus (COVID-19), please contact your health care provider or visit [email or web address].     Disposition/Instructions    Home care recommended. Follow home care protocol based instructions.    Thank you for taking steps to prevent the spread of this virus.  o Limit your contact with others.  o Wear a simple mask to cover your cough.  o Wash your hands well and often.    Resources    [facility]: About COVID-19: [email or web address]    CDC: What to Do If You're Sick: www.cdc.gov/coronavirus/2019-ncov/about/steps-when-sick.html    CDC: Ending Home Isolation: www.cdc.gov/coronavirus/2019-ncov/hcp/disposition-in-home-patients.html     CDC: Caring for Someone:  www.cdc.gov/coronavirus/2019-ncov/if-you-are-sick/care-for-someone.html     [facility]: Interim Guidance for Hospital Discharge to Home: www.health.[facility].[place]/diseases/coronavirus/hcp/hospdischarge.pdf    [facility] clinical trials (COVID-19 research studies): [email or web address]/umn-clinical-trials     Below are the COVID-19 hotlines at the [place] [facility] ([facility]). Interpreters are available.   o For health questions: Call 265-944-5018 or 1-653.631.4383 (7 a.m. to 7 p.m.)  o For questions about schools and childcare: Call 248-549-5177 or 1-148.265.5922 (7 a.m. to 7 p.m.)             Lisa Gale RN  [facility]               Reason for Disposition    [1] Decreased urination and [2] drinking very little AND [2] dehydration suspected (e.g., dark urine, no urine > 12 hours, very dry mouth, very lightheaded)     No urine in > 24 hours    Additional Information    Negative: Shock suspected (e.g., cold/pale/clammy skin, too weak to stand, low BP, rapid pulse)    Negative: Sounds like a life-threatening emergency to the triager    Negative: [1] Unable to urinate (or only a few drops) > 4 hours AND     [2] bladder feels very full (e.g., palpable bladder or strong urge to urinate)    Protocols used: URINARY SYMPTOMS-A-AH

## 2022-02-20 NOTE — TELEPHONE ENCOUNTER
Clinic Action Needed: Yes, please call pt to follow up.  See FNA triage encounter below.    FNA Triage Call    Routed to: KINDRA Gale RN  [facility]

## 2022-02-21 ENCOUNTER — ANESTHESIA EVENT (OUTPATIENT)
Dept: SURGERY | Facility: HOSPITAL | Age: 47
DRG: 853 | End: 2022-02-21
Payer: COMMERCIAL

## 2022-02-21 ENCOUNTER — APPOINTMENT (OUTPATIENT)
Dept: RADIOLOGY | Facility: HOSPITAL | Age: 47
DRG: 853 | End: 2022-02-21
Attending: SPECIALIST
Payer: COMMERCIAL

## 2022-02-21 ENCOUNTER — APPOINTMENT (OUTPATIENT)
Dept: CT IMAGING | Facility: HOSPITAL | Age: 47
DRG: 853 | End: 2022-02-21
Attending: EMERGENCY MEDICINE
Payer: COMMERCIAL

## 2022-02-21 ENCOUNTER — PATIENT OUTREACH (OUTPATIENT)
Dept: CARE COORDINATION | Facility: CLINIC | Age: 47
End: 2022-02-21
Payer: COMMERCIAL

## 2022-02-21 ENCOUNTER — ANESTHESIA (OUTPATIENT)
Dept: SURGERY | Facility: HOSPITAL | Age: 47
DRG: 853 | End: 2022-02-21
Payer: COMMERCIAL

## 2022-02-21 DIAGNOSIS — N17.9 ACUTE RENAL FAILURE, UNSPECIFIED ACUTE RENAL FAILURE TYPE (H): Primary | ICD-10-CM

## 2022-02-21 PROBLEM — E87.5 HYPERKALEMIA: Status: ACTIVE | Noted: 2022-02-21

## 2022-02-21 PROBLEM — E86.0 DEHYDRATION: Status: ACTIVE | Noted: 2022-02-21

## 2022-02-21 PROBLEM — E86.1 HYPOTENSION DUE TO HYPOVOLEMIA: Status: ACTIVE | Noted: 2022-02-21

## 2022-02-21 LAB
ALBUMIN MFR UR ELPH: 18.5 MG/DL
ALBUMIN UR-MCNC: 10 MG/DL
ANION GAP SERPL CALCULATED.3IONS-SCNC: 13 MMOL/L (ref 5–18)
APPEARANCE UR: CLEAR
BILIRUB UR QL STRIP: NEGATIVE
BUN SERPL-MCNC: 41 MG/DL (ref 8–22)
CALCIUM SERPL-MCNC: 7.3 MG/DL (ref 8.5–10.5)
CHLORIDE BLD-SCNC: 106 MMOL/L (ref 98–107)
CO2 SERPL-SCNC: 17 MMOL/L (ref 22–31)
COLOR UR AUTO: COLORLESS
CREAT SERPL-MCNC: 2.76 MG/DL (ref 0.6–1.1)
CREAT UR-MCNC: 57 MG/DL
GFR SERPL CREATININE-BSD FRML MDRD: 21 ML/MIN/1.73M2
GLUCOSE BLD-MCNC: 108 MG/DL (ref 70–125)
GLUCOSE UR STRIP-MCNC: NEGATIVE MG/DL
HCG UR QL: NEGATIVE
HGB UR QL STRIP: ABNORMAL
KETONES UR STRIP-MCNC: NEGATIVE MG/DL
LEUKOCYTE ESTERASE UR QL STRIP: NEGATIVE
MUCOUS THREADS #/AREA URNS LPF: PRESENT /LPF
NITRATE UR QL: NEGATIVE
PH UR STRIP: 5.5 [PH] (ref 5–7)
POTASSIUM BLD-SCNC: 3.4 MMOL/L (ref 3.5–5)
PROCALCITONIN SERPL-MCNC: 0.15 NG/ML (ref 0–0.49)
PROT/CREAT 24H UR: 0.32 MG/MG CR
RBC URINE: <1 /HPF
SODIUM SERPL-SCNC: 136 MMOL/L (ref 136–145)
SP GR UR STRIP: 1.01 (ref 1–1.03)
UROBILINOGEN UR STRIP-MCNC: <2 MG/DL
WBC URINE: 7 /HPF

## 2022-02-21 PROCEDURE — 250N000011 HC RX IP 250 OP 636: Performed by: STUDENT IN AN ORGANIZED HEALTH CARE EDUCATION/TRAINING PROGRAM

## 2022-02-21 PROCEDURE — 710N000009 HC RECOVERY PHASE 1, LEVEL 1, PER MIN: Performed by: SPECIALIST

## 2022-02-21 PROCEDURE — 96366 THER/PROPH/DIAG IV INF ADDON: CPT

## 2022-02-21 PROCEDURE — 94660 CPAP INITIATION&MGMT: CPT

## 2022-02-21 PROCEDURE — 250N000009 HC RX 250: Performed by: NURSE ANESTHETIST, CERTIFIED REGISTERED

## 2022-02-21 PROCEDURE — 999N000180 XR SURGERY CARM FLUORO LESS THAN 5 MIN

## 2022-02-21 PROCEDURE — 0T768DZ DILATION OF RIGHT URETER WITH INTRALUMINAL DEVICE, VIA NATURAL OR ARTIFICIAL OPENING ENDOSCOPIC: ICD-10-PCS | Performed by: SPECIALIST

## 2022-02-21 PROCEDURE — 255N000002 HC RX 255 OP 636: Performed by: SPECIALIST

## 2022-02-21 PROCEDURE — 36569 INSJ PICC 5 YR+ W/O IMAGING: CPT

## 2022-02-21 PROCEDURE — 272N000001 HC OR GENERAL SUPPLY STERILE: Performed by: SPECIALIST

## 2022-02-21 PROCEDURE — 360N000082 HC SURGERY LEVEL 2 W/ FLUORO, PER MIN: Performed by: SPECIALIST

## 2022-02-21 PROCEDURE — 250N000011 HC RX IP 250 OP 636: Performed by: NURSE ANESTHETIST, CERTIFIED REGISTERED

## 2022-02-21 PROCEDURE — 258N000001 HC RX 258: Performed by: SPECIALIST

## 2022-02-21 PROCEDURE — 999N000141 HC STATISTIC PRE-PROCEDURE NURSING ASSESSMENT: Performed by: SPECIALIST

## 2022-02-21 PROCEDURE — 258N000003 HC RX IP 258 OP 636: Performed by: EMERGENCY MEDICINE

## 2022-02-21 PROCEDURE — 81025 URINE PREGNANCY TEST: CPT | Performed by: ANESTHESIOLOGY

## 2022-02-21 PROCEDURE — 258N000003 HC RX IP 258 OP 636: Performed by: NURSE ANESTHETIST, CERTIFIED REGISTERED

## 2022-02-21 PROCEDURE — 81001 URINALYSIS AUTO W/SCOPE: CPT | Performed by: EMERGENCY MEDICINE

## 2022-02-21 PROCEDURE — 250N000013 HC RX MED GY IP 250 OP 250 PS 637: Performed by: STUDENT IN AN ORGANIZED HEALTH CARE EDUCATION/TRAINING PROGRAM

## 2022-02-21 PROCEDURE — 80048 BASIC METABOLIC PNL TOTAL CA: CPT | Performed by: STUDENT IN AN ORGANIZED HEALTH CARE EDUCATION/TRAINING PROGRAM

## 2022-02-21 PROCEDURE — 250N000013 HC RX MED GY IP 250 OP 250 PS 637: Performed by: SPECIALIST

## 2022-02-21 PROCEDURE — 36415 COLL VENOUS BLD VENIPUNCTURE: CPT | Performed by: STUDENT IN AN ORGANIZED HEALTH CARE EDUCATION/TRAINING PROGRAM

## 2022-02-21 PROCEDURE — 200N000001 HC R&B ICU

## 2022-02-21 PROCEDURE — 84156 ASSAY OF PROTEIN URINE: CPT | Performed by: INTERNAL MEDICINE

## 2022-02-21 PROCEDURE — 99222 1ST HOSP IP/OBS MODERATE 55: CPT | Performed by: INTERNAL MEDICINE

## 2022-02-21 PROCEDURE — 999N000157 HC STATISTIC RCP TIME EA 10 MIN

## 2022-02-21 PROCEDURE — 74176 CT ABD & PELVIS W/O CONTRAST: CPT

## 2022-02-21 PROCEDURE — 87086 URINE CULTURE/COLONY COUNT: CPT | Performed by: EMERGENCY MEDICINE

## 2022-02-21 PROCEDURE — C1769 GUIDE WIRE: HCPCS | Performed by: SPECIALIST

## 2022-02-21 PROCEDURE — 84145 PROCALCITONIN (PCT): CPT | Performed by: STUDENT IN AN ORGANIZED HEALTH CARE EDUCATION/TRAINING PROGRAM

## 2022-02-21 PROCEDURE — 250N000009 HC RX 250: Performed by: NURSE PRACTITIONER

## 2022-02-21 PROCEDURE — 96365 THER/PROPH/DIAG IV INF INIT: CPT

## 2022-02-21 PROCEDURE — 250N000009 HC RX 250: Performed by: EMERGENCY MEDICINE

## 2022-02-21 PROCEDURE — 272N000452 HC KIT SHRLOCK 5FR POWER PICC TRIPLE LUMEN

## 2022-02-21 PROCEDURE — 96367 TX/PROPH/DG ADDL SEQ IV INF: CPT

## 2022-02-21 PROCEDURE — C2617 STENT, NON-COR, TEM W/O DEL: HCPCS | Performed by: SPECIALIST

## 2022-02-21 PROCEDURE — 3E043XZ INTRODUCTION OF VASOPRESSOR INTO CENTRAL VEIN, PERCUTANEOUS APPROACH: ICD-10-PCS | Performed by: STUDENT IN AN ORGANIZED HEALTH CARE EDUCATION/TRAINING PROGRAM

## 2022-02-21 PROCEDURE — 258N000003 HC RX IP 258 OP 636: Performed by: STUDENT IN AN ORGANIZED HEALTH CARE EDUCATION/TRAINING PROGRAM

## 2022-02-21 PROCEDURE — 370N000017 HC ANESTHESIA TECHNICAL FEE, PER MIN: Performed by: SPECIALIST

## 2022-02-21 PROCEDURE — 99291 CRITICAL CARE FIRST HOUR: CPT | Performed by: NURSE PRACTITIONER

## 2022-02-21 DEVICE — URETERAL STENT
Type: IMPLANTABLE DEVICE | Site: URETER | Status: FUNCTIONAL
Brand: PERCUFLEX™ PLUS

## 2022-02-21 RX ORDER — CEFAZOLIN SODIUM 1 G/50ML
2500 SOLUTION INTRAVENOUS ONCE
Status: COMPLETED | OUTPATIENT
Start: 2022-02-21 | End: 2022-02-21

## 2022-02-21 RX ORDER — LIDOCAINE 40 MG/G
CREAM TOPICAL
Status: DISCONTINUED | OUTPATIENT
Start: 2022-02-21 | End: 2022-02-21

## 2022-02-21 RX ORDER — LIDOCAINE 40 MG/G
CREAM TOPICAL
Status: DISCONTINUED | OUTPATIENT
Start: 2022-02-21 | End: 2022-02-21 | Stop reason: HOSPADM

## 2022-02-21 RX ORDER — ONDANSETRON 2 MG/ML
INJECTION INTRAMUSCULAR; INTRAVENOUS PRN
Status: DISCONTINUED | OUTPATIENT
Start: 2022-02-21 | End: 2022-02-21

## 2022-02-21 RX ORDER — PROPOFOL 10 MG/ML
INJECTION, EMULSION INTRAVENOUS CONTINUOUS PRN
Status: DISCONTINUED | OUTPATIENT
Start: 2022-02-21 | End: 2022-02-21

## 2022-02-21 RX ORDER — PROPOFOL 10 MG/ML
INJECTION, EMULSION INTRAVENOUS PRN
Status: DISCONTINUED | OUTPATIENT
Start: 2022-02-21 | End: 2022-02-21

## 2022-02-21 RX ORDER — SODIUM CHLORIDE 9 MG/ML
INJECTION, SOLUTION INTRAVENOUS ONCE
Status: COMPLETED | OUTPATIENT
Start: 2022-02-21 | End: 2022-02-21

## 2022-02-21 RX ORDER — ACETAMINOPHEN 325 MG/1
975 TABLET ORAL EVERY 8 HOURS
Status: DISPENSED | OUTPATIENT
Start: 2022-02-21 | End: 2022-02-24

## 2022-02-21 RX ORDER — ACETAMINOPHEN 325 MG/1
650 TABLET ORAL EVERY 4 HOURS PRN
Status: DISCONTINUED | OUTPATIENT
Start: 2022-02-24 | End: 2022-02-27 | Stop reason: HOSPADM

## 2022-02-21 RX ORDER — GABAPENTIN 100 MG/1
100 CAPSULE ORAL 3 TIMES DAILY
Status: DISCONTINUED | OUTPATIENT
Start: 2022-02-21 | End: 2022-02-22

## 2022-02-21 RX ORDER — ACETAMINOPHEN 325 MG/1
650 TABLET ORAL EVERY 6 HOURS PRN
Status: DISCONTINUED | OUTPATIENT
Start: 2022-02-21 | End: 2022-02-21

## 2022-02-21 RX ORDER — POLYETHYLENE GLYCOL 3350 17 G/17G
17 POWDER, FOR SOLUTION ORAL DAILY
Status: DISCONTINUED | OUTPATIENT
Start: 2022-02-22 | End: 2022-02-27 | Stop reason: HOSPADM

## 2022-02-21 RX ORDER — AMOXICILLIN 250 MG
1 CAPSULE ORAL 2 TIMES DAILY
Status: DISCONTINUED | OUTPATIENT
Start: 2022-02-21 | End: 2022-02-27 | Stop reason: HOSPADM

## 2022-02-21 RX ORDER — ONDANSETRON 2 MG/ML
4 INJECTION INTRAMUSCULAR; INTRAVENOUS EVERY 6 HOURS PRN
Status: DISCONTINUED | OUTPATIENT
Start: 2022-02-21 | End: 2022-02-21

## 2022-02-21 RX ORDER — PIPERACILLIN SODIUM, TAZOBACTAM SODIUM 3; .375 G/15ML; G/15ML
3.38 INJECTION, POWDER, LYOPHILIZED, FOR SOLUTION INTRAVENOUS ONCE
Status: COMPLETED | OUTPATIENT
Start: 2022-02-21 | End: 2022-02-21

## 2022-02-21 RX ORDER — NALOXONE HYDROCHLORIDE 0.4 MG/ML
0.4 INJECTION, SOLUTION INTRAMUSCULAR; INTRAVENOUS; SUBCUTANEOUS
Status: DISCONTINUED | OUTPATIENT
Start: 2022-02-21 | End: 2022-02-27 | Stop reason: HOSPADM

## 2022-02-21 RX ORDER — LIDOCAINE 40 MG/G
CREAM TOPICAL
Status: DISCONTINUED | OUTPATIENT
Start: 2022-02-21 | End: 2022-02-27 | Stop reason: HOSPADM

## 2022-02-21 RX ORDER — CALCIUM GLUCONATE 20 MG/ML
1 INJECTION, SOLUTION INTRAVENOUS ONCE
Status: COMPLETED | OUTPATIENT
Start: 2022-02-21 | End: 2022-02-21

## 2022-02-21 RX ORDER — HYDROMORPHONE HCL IN WATER/PF 6 MG/30 ML
0.2 PATIENT CONTROLLED ANALGESIA SYRINGE INTRAVENOUS
Status: DISCONTINUED | OUTPATIENT
Start: 2022-02-21 | End: 2022-02-22

## 2022-02-21 RX ORDER — PIPERACILLIN SODIUM, TAZOBACTAM SODIUM 3; .375 G/15ML; G/15ML
3.38 INJECTION, POWDER, LYOPHILIZED, FOR SOLUTION INTRAVENOUS EVERY 8 HOURS
Status: DISCONTINUED | OUTPATIENT
Start: 2022-02-21 | End: 2022-02-26

## 2022-02-21 RX ORDER — TRAMADOL HYDROCHLORIDE 50 MG/1
50 TABLET ORAL EVERY 6 HOURS PRN
Status: DISCONTINUED | OUTPATIENT
Start: 2022-02-21 | End: 2022-02-27 | Stop reason: HOSPADM

## 2022-02-21 RX ORDER — ONDANSETRON 4 MG/1
4 TABLET, ORALLY DISINTEGRATING ORAL EVERY 6 HOURS PRN
Status: DISCONTINUED | OUTPATIENT
Start: 2022-02-21 | End: 2022-02-21

## 2022-02-21 RX ORDER — ACETAMINOPHEN 650 MG/1
650 SUPPOSITORY RECTAL EVERY 6 HOURS PRN
Status: DISCONTINUED | OUTPATIENT
Start: 2022-02-21 | End: 2022-02-27 | Stop reason: HOSPADM

## 2022-02-21 RX ORDER — HYDROMORPHONE HCL IN WATER/PF 6 MG/30 ML
0.2 PATIENT CONTROLLED ANALGESIA SYRINGE INTRAVENOUS
Status: DISCONTINUED | OUTPATIENT
Start: 2022-02-21 | End: 2022-02-27 | Stop reason: HOSPADM

## 2022-02-21 RX ORDER — BISACODYL 10 MG
10 SUPPOSITORY, RECTAL RECTAL DAILY PRN
Status: DISCONTINUED | OUTPATIENT
Start: 2022-02-21 | End: 2022-02-27 | Stop reason: HOSPADM

## 2022-02-21 RX ORDER — PROCHLORPERAZINE MALEATE 10 MG
10 TABLET ORAL EVERY 6 HOURS PRN
Status: DISCONTINUED | OUTPATIENT
Start: 2022-02-21 | End: 2022-02-27 | Stop reason: HOSPADM

## 2022-02-21 RX ORDER — NALOXONE HYDROCHLORIDE 0.4 MG/ML
0.2 INJECTION, SOLUTION INTRAMUSCULAR; INTRAVENOUS; SUBCUTANEOUS
Status: DISCONTINUED | OUTPATIENT
Start: 2022-02-21 | End: 2022-02-27 | Stop reason: HOSPADM

## 2022-02-21 RX ORDER — ONDANSETRON 4 MG/1
4 TABLET, ORALLY DISINTEGRATING ORAL EVERY 6 HOURS PRN
Status: DISCONTINUED | OUTPATIENT
Start: 2022-02-21 | End: 2022-02-27 | Stop reason: HOSPADM

## 2022-02-21 RX ORDER — SODIUM POLYSTYRENE SULFONATE 15 G/60ML
15 SUSPENSION ORAL; RECTAL ONCE
Status: DISCONTINUED | OUTPATIENT
Start: 2022-02-21 | End: 2022-02-27 | Stop reason: HOSPADM

## 2022-02-21 RX ORDER — SODIUM CHLORIDE, SODIUM LACTATE, POTASSIUM CHLORIDE, CALCIUM CHLORIDE 600; 310; 30; 20 MG/100ML; MG/100ML; MG/100ML; MG/100ML
INJECTION, SOLUTION INTRAVENOUS CONTINUOUS
Status: DISCONTINUED | OUTPATIENT
Start: 2022-02-21 | End: 2022-02-21 | Stop reason: HOSPADM

## 2022-02-21 RX ORDER — ONDANSETRON 2 MG/ML
4 INJECTION INTRAMUSCULAR; INTRAVENOUS EVERY 6 HOURS PRN
Status: DISCONTINUED | OUTPATIENT
Start: 2022-02-21 | End: 2022-02-27 | Stop reason: HOSPADM

## 2022-02-21 RX ORDER — NOREPINEPHRINE BITARTRATE 0.02 MG/ML
.01-.6 INJECTION, SOLUTION INTRAVENOUS CONTINUOUS
Status: DISCONTINUED | OUTPATIENT
Start: 2022-02-21 | End: 2022-02-23

## 2022-02-21 RX ORDER — METOPROLOL SUCCINATE 25 MG/1
25 TABLET, EXTENDED RELEASE ORAL DAILY
Status: DISCONTINUED | OUTPATIENT
Start: 2022-02-21 | End: 2022-02-25

## 2022-02-21 RX ORDER — PIPERACILLIN SODIUM, TAZOBACTAM SODIUM 2; .25 G/10ML; G/10ML
2.25 INJECTION, POWDER, LYOPHILIZED, FOR SOLUTION INTRAVENOUS EVERY 8 HOURS
Status: DISCONTINUED | OUTPATIENT
Start: 2022-02-21 | End: 2022-02-21

## 2022-02-21 RX ORDER — HYDROMORPHONE HCL IN WATER/PF 6 MG/30 ML
0.4 PATIENT CONTROLLED ANALGESIA SYRINGE INTRAVENOUS
Status: DISCONTINUED | OUTPATIENT
Start: 2022-02-21 | End: 2022-02-27 | Stop reason: HOSPADM

## 2022-02-21 RX ADMIN — Medication 0.03 MCG/KG/MIN: at 01:08

## 2022-02-21 RX ADMIN — ONDANSETRON 4 MG: 2 INJECTION INTRAMUSCULAR; INTRAVENOUS at 17:38

## 2022-02-21 RX ADMIN — PIPERACILLIN SODIUM AND TAZOBACTAM SODIUM 3.38 G: 3; .375 INJECTION, POWDER, LYOPHILIZED, FOR SOLUTION INTRAVENOUS at 10:21

## 2022-02-21 RX ADMIN — PROPOFOL 30 MG: 10 INJECTION, EMULSION INTRAVENOUS at 17:24

## 2022-02-21 RX ADMIN — SODIUM CHLORIDE 1000 ML: 9 INJECTION, SOLUTION INTRAVENOUS at 01:50

## 2022-02-21 RX ADMIN — PROPOFOL 50 MG: 10 INJECTION, EMULSION INTRAVENOUS at 17:37

## 2022-02-21 RX ADMIN — SODIUM CHLORIDE 1000 ML: 9 INJECTION, SOLUTION INTRAVENOUS at 03:19

## 2022-02-21 RX ADMIN — PROPOFOL 50 MG: 10 INJECTION, EMULSION INTRAVENOUS at 17:31

## 2022-02-21 RX ADMIN — DOCUSATE SODIUM 50 MG AND SENNOSIDES 8.6 MG 1 TABLET: 8.6; 5 TABLET, FILM COATED ORAL at 20:49

## 2022-02-21 RX ADMIN — Medication 0.06 MCG/KG/MIN: at 10:21

## 2022-02-21 RX ADMIN — Medication 1 UNITS: at 17:59

## 2022-02-21 RX ADMIN — PHENYLEPHRINE HYDROCHLORIDE 200 MCG: 10 INJECTION INTRAVENOUS at 17:29

## 2022-02-21 RX ADMIN — ACETAMINOPHEN 975 MG: 325 TABLET ORAL at 20:05

## 2022-02-21 RX ADMIN — SODIUM CHLORIDE: 9 INJECTION, SOLUTION INTRAVENOUS at 01:19

## 2022-02-21 RX ADMIN — PIPERACILLIN SODIUM AND TAZOBACTAM SODIUM 3.38 G: 3; .375 INJECTION, POWDER, LYOPHILIZED, FOR SOLUTION INTRAVENOUS at 18:36

## 2022-02-21 RX ADMIN — PHENYLEPHRINE HYDROCHLORIDE 100 MCG: 10 INJECTION INTRAVENOUS at 17:22

## 2022-02-21 RX ADMIN — Medication 1 UNITS: at 17:39

## 2022-02-21 RX ADMIN — GABAPENTIN 100 MG: 100 CAPSULE ORAL at 20:05

## 2022-02-21 RX ADMIN — PROPOFOL 50 MG: 10 INJECTION, EMULSION INTRAVENOUS at 17:18

## 2022-02-21 RX ADMIN — VANCOMYCIN HYDROCHLORIDE 2500 MG: 1 INJECTION, POWDER, LYOPHILIZED, FOR SOLUTION INTRAVENOUS at 04:59

## 2022-02-21 RX ADMIN — PROPOFOL 100 MCG/KG/MIN: 10 INJECTION, EMULSION INTRAVENOUS at 17:18

## 2022-02-21 RX ADMIN — LIDOCAINE HYDROCHLORIDE 3 ML: 10 INJECTION, SOLUTION EPIDURAL; INFILTRATION; INTRACAUDAL; PERINEURAL at 00:20

## 2022-02-21 RX ADMIN — CALCIUM GLUCONATE 1 G: 20 INJECTION, SOLUTION INTRAVENOUS at 09:30

## 2022-02-21 RX ADMIN — PROPOFOL 30 MG: 10 INJECTION, EMULSION INTRAVENOUS at 17:25

## 2022-02-21 RX ADMIN — PIPERACILLIN AND TAZOBACTAM 3.38 G: 3; .375 INJECTION, POWDER, LYOPHILIZED, FOR SOLUTION INTRAVENOUS at 03:59

## 2022-02-21 RX ADMIN — Medication 0.08 MCG/KG/MIN: at 23:55

## 2022-02-21 RX ADMIN — PHENYLEPHRINE HYDROCHLORIDE 200 MCG: 10 INJECTION INTRAVENOUS at 17:25

## 2022-02-21 ASSESSMENT — ACTIVITIES OF DAILY LIVING (ADL)
ADLS_ACUITY_SCORE: 6
CHANGE_IN_FUNCTIONAL_STATUS_SINCE_ONSET_OF_CURRENT_ILLNESS/INJURY: NO
ADLS_ACUITY_SCORE: 6
ADLS_ACUITY_SCORE: 14
FALL_HISTORY_WITHIN_LAST_SIX_MONTHS: NO
ADLS_ACUITY_SCORE: 6
WALKING_OR_CLIMBING_STAIRS_DIFFICULTY: NO
DEPENDENT_IADLS:: CLEANING;COOKING;LAUNDRY;SHOPPING;MEAL PREPARATION;TRANSPORTATION
ADLS_ACUITY_SCORE: 15
ADLS_ACUITY_SCORE: 14
ADLS_ACUITY_SCORE: 11
DIFFICULTY_COMMUNICATING: NO
ADLS_ACUITY_SCORE: 6
ADLS_ACUITY_SCORE: 6
ADLS_ACUITY_SCORE: 15
ADLS_ACUITY_SCORE: 14
ADLS_ACUITY_SCORE: 6
ADLS_ACUITY_SCORE: 15
ADLS_ACUITY_SCORE: 6
TOILETING_ISSUES: NO
ADLS_ACUITY_SCORE: 14
ADLS_ACUITY_SCORE: 14
WEAR_GLASSES_OR_BLIND: NO
ADLS_ACUITY_SCORE: 14
CONCENTRATING,_REMEMBERING_OR_MAKING_DECISIONS_DIFFICULTY: NO
ADLS_ACUITY_SCORE: 14
DIFFICULTY_EATING/SWALLOWING: NO
ADLS_ACUITY_SCORE: 11
ADLS_ACUITY_SCORE: 11
DOING_ERRANDS_INDEPENDENTLY_DIFFICULTY: YES
ADLS_ACUITY_SCORE: 15
DRESSING/BATHING_DIFFICULTY: NO
ADLS_ACUITY_SCORE: 14

## 2022-02-21 NOTE — CONSULTS
[facility] CONSULT - KIDNEY/URETERAL STONE      Type of Consult: inpatient   Place of Service:  [facility]   Reason for Consultation: 8x7x3 mm proximal right ureteral stone with mild hydronephrosis   Consult Requested by: Dr. Liriano    History of present illness:  Bess Enamorado is a 46 year old female past medical history remarkable for recent admision for Covid19, acute hypoxic respiratory failure and CHF exacerbation from 2/8-2/18/2022 now admitted through the ED for HUNG, hypotension requiring ICU admission. Urology was consulted for 8x7x3 mm proximal right ureteral stone with mild hydronephrosis . History obtained through patient, and chart reviewed.    Bess states she has been home oxygen and diuretics since her last admission, which has caused frequent urination. She became concerns hen she did not produce urine yesterday resulting in ED evaluation. She denies nausea, vomiting or abdominal pain. ED labs remarkable for hyponatremia 134, hyperkalemia 5.6, HUNG creatinine 4.46 (baseline 0.8), hypercalcemia 11.0 and leukocytosis 14.4/ Lact acid normal. No UA obtained.   ED imaging with CXR unremarkable. Initial thought etiology of laboratory abnormalities and decreased urinary outpur related to overdiuresis however she then became hypotensive and in setting of elevated WBC concerns for infection. Blood cultures then added and pending . Broad spectrum antibiotics  initiated with  Zosyn q 8 hours. Given need for pressures was admitted to ICU.  A CT abdo/plvis was obtained this moring which showed  8 x 7 x 3 mm stone in the proximal right ureter causes mild hydronephrosis, colonic diverticulosis, hepatic steatosis, and no significant change in the 9.4 cm left ovarian dermoid.     She denies prior history of kidney stones or urologic disease.     She states last ate yesterday evening, NPO today.         Past medical history:  Past Medical History:   Diagnosis Date     Acute on chronic diastolic  congestive heart failure (H) 2/9/2022     Arthritis 2019    Hips     ASCUS favor benign 8/2015    Neg HPV     Depressive disorder 2010     H/O colposcopy with cervical biopsy 9/14/15    Bx & ECC - negative     Hypertension 2019     LSIL on Pap smear 7/2014    Neg high risk HPV     Multiple sclerosis (H) 8/29/2005 9/18/200pt dx with MS 2004--dx by neurolgist and is followed by Dr. Steven Ayala 10/2016     UNSPEC CONSTIPATION 9/18/2006 9/18/2006   Has tried otc suppository and otc lax.        Past surgical history:  Past Surgical History:   Procedure Laterality Date     CHOLECYSTECTOMY, LAPOROSCOPIC      Cholecystectomy, Laparoscopic     COLONOSCOPY  2007?    Bathroom issue..results normal     OPEN REDUCTION INTERNAL FIXATION TOE(S)  4/13/2012    Procedure:OPEN REDUCTION INTERNAL FIXATION TOE(S); Open reduction internal fixation right proximal fifth metatarsal fracture-   Anes-choice block; Surgeon:LEY, JEFFREY DUANE; Location:[place] OR     PICC TRIPLE LUMEN PLACEMENT  2/21/2022            Social history:  Social History     Socioeconomic History     Marital status: Single     Spouse name: Not on file     Number of children: Not on file     Years of education: Not on file     Highest education level: Not on file   Occupational History     Employer: [employer]   Tobacco Use     Smoking status: Never Smoker     Smokeless tobacco: Never Used   Vaping Use     Vaping Use: Never used   Substance and Sexual Activity     Alcohol use: Yes     Comment: social     Drug use: No     Sexual activity: Not Currently     Partners: Male     Birth control/protection: Pill   Other Topics Concern     Parent/sibling w/ CABG, MI or angioplasty before 65F 55M? No   Social History Narrative    , 3rd-5th grade     Social Determinants of Health     Financial Resource Strain: Not on file   Food Insecurity: Not on file   Transportation Needs: Not on file   Physical Activity: Not on file   Stress: Not on  file   Social Connections: Not on file   Intimate Partner Violence: Not At Risk     Fear of Current or Ex-Partner: No     Emotionally Abused: No     Physically Abused: No     Sexually Abused: No   Housing Stability: Not on file       Medications:  Current Facility-Administered Medications   Medication     acetaminophen (TYLENOL) tablet 650 mg    Or     acetaminophen (TYLENOL) Suppository 650 mg     acetaminophen (TYLENOL) tablet 650 mg     gabapentin (NEURONTIN) capsule 100 mg     HYDROmorphone (DILAUDID) injection 0.2 mg     lidocaine (LMX4) cream     lidocaine (LMX4) cream     lidocaine 1 % 0.1-1 mL     lidocaine 1 % 0.1-5 mL     melatonin tablet 1 mg     [Held by provider] metoprolol succinate ER (TOPROL-XL) 24 hr tablet 25 mg     miconazole (MICATIN) 2 % powder     naloxone (NARCAN) injection 0.2 mg    Or     naloxone (NARCAN) injection 0.4 mg    Or     naloxone (NARCAN) injection 0.2 mg    Or     naloxone (NARCAN) injection 0.4 mg     norepinephrine (LEVOPHED) 4 mg in  mL infusion PREMIX     ondansetron (ZOFRAN-ODT) ODT tab 4 mg    Or     ondansetron (ZOFRAN) injection 4 mg     Patient is already receiving anticoagulation with heparin, enoxaparin (LOVENOX), warfarin (COUMADIN)  or other anticoagulant medication     piperacillin-tazobactam (ZOSYN) 3.375 g vial to attach to  mL bag     [Held by provider] rivaroxaban ANTICOAGULANT (XARELTO) tablet 15 mg     sertraline (ZOLOFT) tablet 100 mg     sodium chloride (PF) 0.9% PF flush 10-20 mL     sodium chloride (PF) 0.9% PF flush 10-40 mL     sodium chloride (PF) 0.9% PF flush 10-40 mL     sodium chloride (PF) 0.9% PF flush 10-40 mL     sodium chloride (PF) 0.9% PF flush 3 mL     sodium chloride (PF) 0.9% PF flush 3 mL     sodium polystyrene (KAYEXALATE) suspension 15 g     vancomycin place zarate - receiving intermittent dosing       Allergies:  Allergies   Allergen Reactions     Nkda [No Known Drug Allergies]        Review of systems:  A full 12 point  "review of systems was taken and is negative aside from what is noted above in the HPI.    Physical exam:  /55   Pulse 97   Temp 98.6  F (37  C) (Oral)   Resp 19   Ht 1.626 m (5' 4\")   Wt (!) 159.7 kg (352 lb)   LMP 02/19/2022 (Exact Date)   SpO2 95%   Breastfeeding No   BMI 60.42 kg/m     General: NAD, alert, ill appearing, obese female  Head: normocephalic, without abnormality / atraumatic  Respiratory: wearing oxygen tubing, mild dyspnea  Abdomen: soft,non tender,non distended,no suprapubic fullness/tenderness.no CVA tenderness  Geniturinary: deferred   Skin: + lower extremity lymphedema and scaling of skin  Musculoskeletal: moves extremities equally; no calf edema or tenderness  Psychological: alert and oriented, answers questions appropriately      Labs:   Lab Results   Component Value Date/Time    WBC 14.1 (H) 02/20/2022 09:22 PM    WBC 7.3 03/07/2021 06:00 AM    HGB 15.5 02/20/2022 09:22 PM    HGB 12.5 03/07/2021 06:00 AM    CR 2.76 (H) 02/21/2022 06:26 AM    CR 0.62 03/07/2021 06:00 AM    POTASSIUM 3.4 (L) 02/21/2022 06:26 AM    POTASSIUM 4.3 03/07/2021 06:00 AM    MAG 2.4 02/20/2022 09:22 PM    MAG 1.9 06/14/2013 01:10 AM     02/20/2022 09:22 PM     03/08/2021 03:50 AM       Urine:UA  Ordered       CULTURES:  Urine Culture: Ordered   Blood Cultures: pending   Lab Results: personally reviewed     IMAGING:  EXAM: CT ABDOMEN PELVIS W/O CONTRAST  LOCATION: [facility]  DATE/TIME: 2/21/2022 5:23 AM     INDICATION: Diminished urine output. Acute renal failure and hypotension.  COMPARISON: CTA chest 02/08/2022. CT abdomen and pelvis 10/22/2018.  TECHNIQUE: CT scan of the abdomen and pelvis was performed without IV contrast. Multiplanar reformats were obtained. Dose reduction techniques were used.  CONTRAST: None.                                                                 IMPRESSION:   1.  8 x 7 x 3 mm stone in the proximal right ureter causes mild " hydronephrosis.  2.  Colonic diverticulosis.  3.  Hepatic steatosis.  4.  No significant change in the 9.4 cm left ovarian dermoid  I have personally reviewed the imaging reports above.     ASSESSMENT/PLAN: Bess Enamorado is being seen by [facility] for 8x7x3 mm proximal right ureteral stone with mild hydronephrosis. She is admitted to ICU with hypotension requiring pressor support      - Patient's white blood cell count is elevated   - patient has been afebrile however she has had tachycardia and hypotention in setting of HUNG and elevated WBC with known proximal large right ureteral stone and hydronephrosis concerns for infected stone  -UA and culture ordered  - Agree with broad spectrum antibiotics, Intensivist managing with q8h Zosyn, continue until culture and sensitivities return   - Discussed patient with Dr. Palomares. Given concern for infected stone recommend urgent renal decompression, recommend cystoscopy with right ureteral stent placement and retrograde pyelogram   -  supportive cares including: IV fluids, antipyretics, analgesics and antiemetics per the primary team  - Keep patient n.p.o., patient last ate at yesterday evening  - Continue to strain all urine until procedure  - Old records reviewed -notes since admission    Discussed need for urgent decompression to help both the pain and allow for adequate drainage of the renal collecting system and treatment of infection. We discussed the importance of treatment and that left untreated infected urinary stones could lead to renal impairment, urosepsis and eventual multiorgan failure.       This case was discussed with:Dr. Palomares    Thank you for consulting [facility] regarding this patient's care. Please contact us with questions or concerns.     Flora Wei PA-C  [facility]   [phone]

## 2022-02-21 NOTE — H&P
[facility]    History and Physical - Hospitalist Service       Date of Admission:  2/20/2022    Assessment & Plan      Bess Enamorado is a 46 year old female admitted on 2/20/2022.     46-year-old female recently discharged after admitted for Covid19, acute hypoxic respiratory failure and CHF exacerbation from 2/8-2/18/2022 presents back with decreased urine output and found to have HUNG and hypotensive requiring pressors      Hypotension  -BP 70s-80s/40s-50s, did not respond to IV fluids, started on Levophed drip.  No ICU bed available.  Will be boarding in the emergency.  -Cause unclear ,unllikely to be just volume depletion, with leukocytosis WBC 14.1, lactic acid normal at 1.8.  Was on treatment for LE cellulitis with oral antibiotics on recent discharge. CXR with no pneumonia, no signs of worsening cellulitis, but will start broad spectrum IV antibiotics pending cultures. ED discussed with tele intensivist and managing pressors      HUNG  -Serum creatinine up to 4.4 from baseline 0.8, 3 days back  -Likely prerenal from over diuresis, CT abdomen to r/o obstruction  -IV hydration/pressors, hold Bumex and lisinopril  -Nephrology consult    Addendum  CT abdomen showing 8 x 7 x 3 mm stone proximal right ureter causing mild hydronephrosis.  Urology consult placed.    Mild hyperkalemia  -No EKG changes,  Kayexalate.  Repeat BMP  -Keep on telemetry    Mild hypercalcemia  -Likely from volume depletion, rpt after hydration    HFpEF  -BNP trended down 64, was greater than 2 K on recent admission, chest x-ray with no pulmonary congestion.  No HF exacerbation.  Holding diuretics    Chronic hypoxic respiratory failure  -Continue home oxygen supplementation     Recent Covid infection  -Tested Positive for COVID-19 on 12/11/2021 and 2/8/22  -Rxed with remdesivir recent admission.  Continue monitoring clinically    History of PE  -Diagnosed on recent admission on 2/8 CTA showing small nonocclusive right  "upper lobe PE  -Continue PTA Xarelto    Chronic lower extremity lymphedemas  -OT for lymphedema wrap    NARCISA  -CPAP at night    History of cellulitis  -Prescribed cefuroxime on recent discharge  -Broad spectrum antibiotics as above       Hypertension  -Soft BP on presentation, hold antihypertensives, now on pressors in the emergency.      Morbid obesity-BMI 60.4    History of MS  -Left lower extremity weakness and pain    History of dysfunctional uterine bleeding  Hx ovarian dermoid cyst  -Hemoglobin stable, follow GYN as outpatient         Diet:    DVT Prophylaxis: DOAC  Brar Catheter: Not present  Central Lines: PRESENT  PICC Triple Lumen 02/21/22 Right Brachial vein medial Vasporessors-Site Assessment: WDL  Cardiac Monitoring: None  Code Status:   Full     Clinically Significant Risk Factors Present on Admission        # Hyperkalemia: K = 5.6 mmol/L (Ref range: 3.5 - 5.0 mmol/L) on admission, will monitor as appropriate   # Hypercalcemia: Ca = 11.0 mg/dL (Ref range: 8.5 - 10.5 mg/dL) and/or iCa = N/A on admission, will monitor as appropriate   # Anion Gap Metabolic Acidosis: AG = 21 mmol/L (Ref range: 5 - 18 mmol/L) on admission, will monitor and treat as appropriate  # Acute Kidney Injury, unspecified: based on a >150% increase in creatinine on admission, will monitor renal function  # Coagulation Defect: home medication list includes an anticoagulant medication   # Circulatory Shock: currently requiring pressors for blood pressure support   # Severe Obesity: Estimated body mass index is 60.42 kg/m  as calculated from the following:    Height as of this encounter: 1.626 m (5' 4\").    Weight as of this encounter: 159.7 kg (352 lb).      Disposition Plan   Expected Discharge: multiple days       The patient's care was discussed with the Patient.    Frank Liriano MD  Hospitalist Service  [facility]  Securely message with the Vocera Web Console (learn more here)  Text page via Loop App " Paging/Directory         ______________________________________________________________________    Chief Complaint   Decreased urine output     History is obtained from the patient    History of Present Illness   Bess Enamorado is a 46 year old female who  was recently discharged after admitted for Covid19, acute hypoxic respiratory failure and CHF exacerbation from 2/8-2/18/2022 presents to the ED with decreased urine output .  On her recent admission patient was aggressively diuresed with -16 L negative balance and discharged improved.  She was also noted to have PE  and  started on Xarelto and lower extremity cellulitis initially treated with IV antibiotics and prescribed cefuroxime on discharge for 2 more days.     She reports feeling better on day of discharge, but since Saturday 2/19 started to notice progressively decreased urine output and has not been able to have any urine on sunday by self being on Bumex as prescribed.  Denies any fever, chills, chest pain, worsening shortness of breath from baseline but has been feeling generally weak and noticed increasing bilateral leg swelling last 2 days.  At the ED she was noted to be hypotensive not responding to IV hydration, satting well on room air, furthe work-up BMP with serum creatinine 4.44 from baseline 0.8, 3 days ago, and mild hyperkalemia of 5.6 with no EKG changes.  WBC 14.1, chest x-ray with no pulmonary congestion, and lactic acid normal at 1.8.  Started on Levophed drip and boarding in the emergency department until ICU beds available.    Review of Systems    The 10 point Review of Systems is negative other than noted in the HPI or here    Past Medical History    I have reviewed this patient's medical history and updated it with pertinent information if needed.   Past Medical History:   Diagnosis Date     Acute on chronic diastolic congestive heart failure (H) 2/9/2022     Arthritis 2019    Hips     ASCUS favor benign 8/2015    Neg HPV     Depressive  disorder 2010     H/O colposcopy with cervical biopsy 9/14/15    Bx & ECC - negative     Hypertension 2019     LSIL on Pap smear 7/2014    Neg high risk HPV     Multiple sclerosis (H) 8/29/2005 9/18/200pt dx with MS 2004--dx by neurolgi and is followed by Dr. Steven Ayala 10/2016     UNSPEC CONSTIPATION 9/18/2006 9/18/2006   Has tried otc suppository and otc lax.        Past Surgical History   I have reviewed this patient's surgical history and updated it with pertinent information if needed.  Past Surgical History:   Procedure Laterality Date     CHOLECYSTECTOMY, LAPOROSCOPIC      Cholecystectomy, Laparoscopic     COLONOSCOPY  2007?    Bathroom issue..results normal     OPEN REDUCTION INTERNAL FIXATION TOE(S)  4/13/2012    Procedure:OPEN REDUCTION INTERNAL FIXATION TOE(S); Open reduction internal fixation right proximal fifth metatarsal fracture-   Anes-choice block; Surgeon:LEY, JEFFREY DUANE; Location:[place] OR     PICC TRIPLE LUMEN PLACEMENT  2/21/2022            Social History   I have reviewed this patient's social history and updated it with pertinent information if needed.  Social History     Tobacco Use     Smoking status: Never Smoker     Smokeless tobacco: Never Used   Vaping Use     Vaping Use: Never used   Substance Use Topics     Alcohol use: Yes     Comment: social     Drug use: No       Family History   I have reviewed this patient's family history and updated it with pertinent information if needed.  Family History   Problem Relation Age of Onset     Neurologic Disorder Father         MS     Heart Disease Father         irregular heart rate     Diabetes Father      Melanoma Father      Sleep Apnea Father      Other Cancer Father      Cancer Maternal Grandfather         lung     Other Cancer Maternal Grandfather      Cancer Paternal Grandmother         stomach     Other Cancer Paternal Grandmother      Cancer Mother      Other Cancer Mother      Thyroid Disease Mother      Gynecology  Maternal Aunt         PCOS     Hypertension Maternal Grandmother      Anxiety Disorder Maternal Grandmother      Thyroid Disease Maternal Grandmother      Anxiety Disorder Sister        Prior to Admission Medications   Prior to Admission Medications   Prescriptions Last Dose Informant Patient Reported? Taking?   Cholecalciferol (VITAMIN D3 PO) 2/20/2022 at am Self Yes Yes   Sig: Take 10,000 Units by mouth daily    acetaminophen (TYLENOL) 325 MG tablet  Self Yes Yes   Sig: Take 650 mg by mouth every 6 hours as needed for mild pain    bumetanide (BUMEX) 2 MG tablet 2/20/2022 at pm  No Yes   Sig: Take 1 tablet (2 mg) by mouth 2 times daily   cefuroxime (CEFTIN) 500 MG tablet 2/20/2022 at pm  No Yes   Sig: Take 1 tablet (500 mg) by mouth every 12 hours   desogestrel-ethinyl estradiol (APRI) 0.15-30 MG-MCG tablet Past Month  No Yes   Sig: TAKE 1 TABLET DAILY CONTINUOUSLY-OMIT PLACEBPO TABS UNTIL AFTER 3 MONTHS OF HORMONE TABS   gabapentin (NEURONTIN) 100 MG capsule 2/20/2022 at x2 doses  No Yes   Sig: TAKE 2 CAPSULES(200 MG) BY MOUTH THREE TIMES DAILY   lisinopril (ZESTRIL) 10 MG tablet 2/20/2022 at am  No Yes   Sig: Take 1 tablet (10 mg) by mouth daily   magnesium oxide (MAG-OX) 400 MG tablet 2/20/2022 at pm  No Yes   Sig: Take 1 tablet (400 mg) by mouth 2 times daily   metoprolol succinate ER (TOPROL-XL) 25 MG 24 hr tablet 2/20/2022 at am  No Yes   Sig: TAKE 1 TABLET(25 MG) BY MOUTH DAILY   miconazole (MICATIN) 2 % external powder 2/20/2022 at pm  No Yes   Sig: Apply topically 2 times daily   rivaroxaban ANTICOAGULANT (XARELTO) 15 MG TABS tablet 2/20/2022 at x 2 doses   No Yes   Sig: Take 1 tablet (15 mg) by mouth 2 times daily (with meals) for 15 days   rivaroxaban ANTICOAGULANT (XARELTO) 20 MG TABS tablet   No Yes   Sig: Take 1 tablet (20 mg) by mouth daily (with dinner)   sertraline (ZOLOFT) 100 MG tablet 2/20/2022 at am  No Yes   Sig: Take 1 tablet (100 mg) by mouth daily      Facility-Administered Medications:  None     Allergies   Allergies   Allergen Reactions     Nkda [No Known Drug Allergies]        Physical Exam   Vital Signs: Temp: 98.6  F (37  C) Temp src: Oral BP: 92/55 Pulse: 107   Resp: 21 SpO2: 98 % O2 Device: Nasal cannula Oxygen Delivery: 2 LPM  Weight: 352 lbs 0 oz    General Appearance: Alert oriented, not in distress  Eyes: Pink conjunctiva, NIS  HEENT: PERRLA  Respiratory: Bilateral clear lungs, no wheezing  Cardiovascular: S1-S2, tachycardic  GI: Soft abdomen, nontender  Genitourinary: No SP tenderness  Skin: Bilateral lower extremity dry skin with chronic venous stasis changes  Musculoskeletal: Bilateral pedal and pretibial edema  Neurologic: AOx3      Data   Data reviewed today: I reviewed all medications, new labs and imaging results over the last 24 hours. I personally reviewed   Recent Labs   Lab 02/20/22 2122 02/18/22  0555 02/17/22  0538 02/15/22  0519 02/14/22  0510   WBC 14.1*  --   --   --  7.1   HGB 15.5  --   --   --  14.1   MCV 89  --   --   --  88     --   --   --  274   *  --  138  --  138   POTASSIUM 5.6* 4.2 3.9  4.0   < > 4.0   CHLORIDE 92*  --  98  --  98   CO2 21*  --  27  --  28   BUN 47*  --  17  --  15   CR 4.46*  --  0.85  --  0.74   ANIONGAP 21*  --  13  --  12   EVITA 11.0*  --  9.9  --  9.9   GLC 97  --  102  --  99   ALBUMIN 3.7  --   --   --   --    PROTTOTAL 8.1*  --   --   --   --    BILITOTAL 1.8*  --   --   --   --    ALKPHOS 66  --   --   --   --    ALT 19  --   --   --   --    AST 31  --   --   --   --     < > = values in this interval not displayed.     Recent Results (from the past 24 hour(s))   XR Chest Port 1 View    Narrative    EXAM: XR CHEST PORT 1 VIEW  LOCATION: [facility]  DATE/TIME: 2/20/2022 9:43 PM    INDICATION: Mildly increased dyspnea, suspect hypervolemia  COMPARISON: Chest CTA 02/08/2022, 03/05/2021, chest x-ray 03/04/2021      Impression    IMPRESSION: Large body habitus. Shallow inspiration with crowding of  pulmonary vessels. No signs of pneumonia or failure. Heart and pulmonary vascularity are normal.

## 2022-02-21 NOTE — PHARMACY-VANCOMYCIN DOSING SERVICE
"Pharmacy Vancomycin Initial Note  Date of Service 2022  Patient's  1975  46 year old, female    Indication: Sepsis    Current estimated CrCl = Estimated Creatinine Clearance: 23.8 mL/min (A) (based on SCr of 4.46 mg/dL (H)).    Creatinine for last 3 days  2022:  9:22 PM Creatinine 4.46 mg/dL    Recent Vancomycin Level(s) for last 3 days  No results found for requested labs within last 72 hours.      Vancomycin IV Administrations (past 72 hours)      No vancomycin orders with administrations in past 72 hours.                Nephrotoxins and other renal medications (From now, onward)    Start     Dose/Rate Route Frequency Ordered Stop    22 0901  piperacillin-tazobactam (ZOSYN) 3.375 g vial to attach to  mL bag        Note to Pharmacy: Extended infusion dosing to start 6 hours after initial infusion.   \"Followed by\" Linked Group Details    3.375 g  over 240 Minutes Intravenous EVERY 8 HOURS 22 0302      22 0330  piperacillin-tazobactam (ZOSYN) 3.375 g vial to attach to  mL bag        \"Followed by\" Linked Group Details    3.375 g  over 30 Minutes Intravenous ONCE 22 0302      22 0100  norepinephrine (LEVOPHED) 4 mg in  mL infusion PREMIX         0.01-0.6 mcg/kg/min × 159.7 kg  6-359.3 mL/hr  Intravenous CONTINUOUS 22 0048            Contrast Orders - past 72 hours (72h ago, onward)            None              Plan:  1. Start vancomycin  2500 mg IV x1 dose in ER.   2. Subsequent dosing will be per levels, pending improvement in renal function.      Majo Gomez, [facility]    "

## 2022-02-21 NOTE — ED NOTES
Dr. Levy in room to see pt. Pt is alert and orientedx3, breathing is easy and nonlabored. Pt explained need for PICC line.

## 2022-02-21 NOTE — TELEPHONE ENCOUNTER
Patient is back in the ED and being admitted to the ICU. Refer to [facility] review.     Summer Lizarraga RN BSN

## 2022-02-21 NOTE — SIGNIFICANT EVENT
Discussed with intensivist Dr. Ibarra, Bed will open up this morning and patient will be admitted to [facility] ICU this morning      Frank Liriano MD

## 2022-02-21 NOTE — PROGRESS NOTES
"Patent set-up on hosp Auto-titrating CPAP, [id number], with 2lpm 02 bleed-in. Large over-the-face mask, liquicell in placed to ensure skin integrity.     /56   Pulse 100   Temp 98.6  F (37  C) (Oral)   Resp 18   Ht 1.626 m (5' 4\")   Wt (!) 159.7 kg (352 lb)   LMP 02/19/2022 (Exact Date)   SpO2 97%   Breastfeeding No   BMI 60.42 kg/m       Teo Zaman, RRT  "

## 2022-02-21 NOTE — TELEPHONE ENCOUNTER
Patient is back in the ED and being admitted to the ICU. Refer to [facility] review.     Summer Lizarraga RN BSN

## 2022-02-21 NOTE — ANESTHESIA PREPROCEDURE EVALUATION
Anesthesia Pre-Procedure Evaluation    Patient: Bess Enamorado   MRN: [id number] : 1975        Procedure : Procedure(s):  CYSTOSCOPY, WITH right RETROGRADE PYELOGRAM AND right URETERAL STENT REPLACEMENT          Past Medical History:   Diagnosis Date     Acute on chronic diastolic congestive heart failure (H) 2022     Arthritis 2019    Hips     ASCUS favor benign 2015    Neg HPV     Depressive disorder      H/O colposcopy with cervical biopsy 9/14/15    Bx & ECC - negative     Hypertension      LSIL on Pap smear 2014    Neg high risk HPV     Multiple sclerosis (H) 2005pt dx with MS --dx by neurolgist and is followed by Dr. Steven Ayala 10/2016     UNSPEC CONSTIPATION 2006   Has tried otc suppository and otc lax.       Past Surgical History:   Procedure Laterality Date     CHOLECYSTECTOMY, LAPOROSCOPIC      Cholecystectomy, Laparoscopic     COLONOSCOPY  ?    Bathroom issue..results normal     OPEN REDUCTION INTERNAL FIXATION TOE(S)  2012    Procedure:OPEN REDUCTION INTERNAL FIXATION TOE(S); Open reduction internal fixation right proximal fifth metatarsal fracture-   Anes-choice block; Surgeon:LEY, JEFFREY DUANE; Location:[place] OR     PICC TRIPLE LUMEN PLACEMENT  2022           Allergies   Allergen Reactions     Nkda [No Known Drug Allergies]       Social History     Tobacco Use     Smoking status: Never Smoker     Smokeless tobacco: Never Used   Substance Use Topics     Alcohol use: Yes     Comment: social      Wt Readings from Last 1 Encounters:   22 (!) 159.7 kg (352 lb)        Anesthesia Evaluation   Pt has had prior anesthetic. Type: General.    No history of anesthetic complications       ROS/MED HX  ENT/Pulmonary:     (+) sleep apnea, severe, uses CPAP, NARCISA risk factors, hypertension, obese,     Neurologic:     (+) Multiple Sclerosis,     Cardiovascular:     (+) hypertension--CAD -past MI --CHF Previous cardiac testing    Echo: Date: 2/13/22 Results:  ______________________________________________________________________________  Procedure  Complete Portable Echo Adult. Definity (NDC #77998-521) given intravenously.  No hemodynamically significant valvular abnormalities on 2D or color flow  imaging. Technically difficult, suboptimal study. There is no comparison study  available.  ______________________________________________________________________________  Interpretation Summary     1.Left ventricular size, wall motion and function are normal. The ejection  fraction is > 65%.  2.Moderately decreased right ventricular systolic function  3.No hemodynamically significant valvular abnormalities on 2D or color flow  imaging.  4.Technically difficult, suboptimal study due to patient size.  There is no comparison study available.  ______________________________________________________________________________  Stress Test: Date: Results:    ECG Reviewed: Date: Results:    Cath: Date: Results:      METS/Exercise Tolerance:     Hematologic:       Musculoskeletal:       GI/Hepatic: Comment: constipation      Renal/Genitourinary:     (+) renal disease (HPI), Nephrolithiasis ,     Endo:     (+) Obesity,     Psychiatric/Substance Use:     (+) psychiatric history anxiety and depression     Infectious Disease:       Malignancy:       Other:      (+) , other significant disability (recent COVID19 infect) Other (comment),          Physical Exam    Airway  airway exam normal      Mallampati: III   TM distance: > 3 FB   Neck ROM: full   Mouth opening: > 3 cm    Respiratory Devices and Support         Dental  no notable dental history         Cardiovascular   cardiovascular exam normal       Rhythm and rate: regular and normal     Pulmonary   pulmonary exam normal        breath sounds clear to auscultation           OUTSIDE LABS:  CBC:   Lab Results   Component Value Date    WBC 14.1 (H) 02/20/2022    WBC 7.1 02/14/2022    HGB 15.5 02/20/2022    HGB  14.1 02/14/2022    HCT 51.4 (H) 02/20/2022    HCT 46.4 02/14/2022     02/20/2022     02/14/2022     BMP:   Lab Results   Component Value Date     02/21/2022     (L) 02/20/2022    POTASSIUM 3.4 (L) 02/21/2022    POTASSIUM 5.6 (H) 02/20/2022    CHLORIDE 106 02/21/2022    CHLORIDE 92 (L) 02/20/2022    CO2 17 (L) 02/21/2022    CO2 21 (L) 02/20/2022    BUN 41 (H) 02/21/2022    BUN 47 (H) 02/20/2022    CR 2.76 (H) 02/21/2022    CR 4.46 (H) 02/20/2022     02/21/2022    GLC 97 02/20/2022     COAGS:   Lab Results   Component Value Date    INR 1.23 (H) 02/11/2022     POC:   Lab Results   Component Value Date    HCG Negative 04/13/2012     HEPATIC:   Lab Results   Component Value Date    ALBUMIN 3.7 02/20/2022    PROTTOTAL 8.1 (H) 02/20/2022    ALT 19 02/20/2022    AST 31 02/20/2022    ALKPHOS 66 02/20/2022    BILITOTAL 1.8 (H) 02/20/2022     OTHER:   Lab Results   Component Value Date    PH 7.46 (H) 02/08/2022    LACT 1.8 02/20/2022    A1C 5.7 (H) 02/13/2020    EVITA 7.3 (L) 02/21/2022    MAG 2.4 02/20/2022    LIPASE 90 06/14/2013    AMYLASE 52 06/14/2013    TSH 2.81 01/09/2012    T4 1.14 08/23/2010    CRP 8.5 (H) 02/12/2022       Anesthesia Plan    ASA Status:  4, emergent    NPO Status:  NPO Appropriate    Anesthesia Type: MAC.     - Reason for MAC: immobility needed, straight local not clinically adequate              Consents    Anesthesia Plan(s) and associated risks, benefits, and realistic alternatives discussed. Questions answered and patient/representative(s) expressed understanding.    - Discussed:     - Discussed with:  Patient      - Extended Intubation/Ventilatory Support Discussed: No.      - Patient is DNR/DNI Status: No    Use of blood products discussed: No .     Postoperative Care    Pain management: IV analgesics, Oral pain medications.   PONV prophylaxis: Ondansetron (or other 5HT-3), Dexamethasone or Solumedrol     Comments:                Doyle Rider MD

## 2022-02-21 NOTE — CONSULTS
NEPHROLOGY CONSULTATION    CC: Decreased urine output.    REASON FOR CONSULTATION: We are asked to see pt by     HISTORY OF PRESENT ILLNESS:46 year old female with past medical history significant for morbid obesity,  congestive heart failure, chronic lymphedema of lower extremities, hypertension, PE on chronic articulation of Xarelto, obstructive apnea on CPAP at night and multiple sclerosis who was recently 02/08-02/18/2022 admitted for COVID-19 infection, acute hypoxic respiratory failure and congestive heart failure exacerbation.  Patient presents to emergency room [facility] on 02/20 for further evaluation of decreased urine output for more than 24 hours.  Patient says she was in her usual state of health until 02/19 which noticed progressive decline in urine output.  On Sunday she was not urinating at all.  Patient needs generalized weakness and profound fatigue.  Furthermore she admits to increase bilateral leg swelling and loss of days as well.  Patient denies fever, chills, chest pain, worsening shortness of breath, changes in appetite, diarrhea, dysuria, flank pain.  In the emergency room the patient was found to have hypotension which was not responding to aggressive hydration.  Subsequently she was started on Levophed drip and broad-spectrum IV antibiotics.  Her laboratory work-up was significant for elevated serum creatinine of 4.46 mg/dL from baseline 0.8 g/dL, mild hyperkalemia 5.6, mild metabolic acidosis, mild hypercalcemia of 11 and mild leukocytosis of 14.1K.  CT abdomen pelvis without contrast showed 8 x 7 x 3 m stone in the proximal right ureter causes mild hydronephrosis, hepatic steatosis, colonic diverticulosis and left ovarian dermoid.  Patient was admitted to ICU for further management.  At the time of physical exam, the patient stated she is feeling better.  Patient reports improved generalized weakness and fatigue.  She reports urinating without difficulties.    REVIEW  OF SYSTEMS:  ROS was completely reviewed and otherwise negative and non-contributory    Past Medical History:   Diagnosis Date     Acute on chronic diastolic congestive heart failure (H) 2/9/2022     Arthritis 2019    Hips     ASCUS favor benign 8/2015    Neg HPV     Depressive disorder 2010     H/O colposcopy with cervical biopsy 9/14/15    Bx & ECC - negative     Hypertension 2019     LSIL on Pap smear 7/2014    Neg high risk HPV     Multiple sclerosis (H) 8/29/2005 9/18/200pt dx with MS 2004--dx by neurolgist and is followed by Dr. Steven Ayala 10/2016     UNSPEC CONSTIPATION 9/18/2006 9/18/2006   Has tried otc suppository and otc lax.        Social History     Socioeconomic History     Marital status: Single     Spouse name: Not on file     Number of children: Not on file     Years of education: Not on file     Highest education level: Not on file   Occupational History     Employer: [employer]   Tobacco Use     Smoking status: Never Smoker     Smokeless tobacco: Never Used   Vaping Use     Vaping Use: Never used   Substance and Sexual Activity     Alcohol use: Yes     Comment: social     Drug use: No     Sexual activity: Not Currently     Partners: Male     Birth control/protection: Pill   Other Topics Concern     Parent/sibling w/ CABG, MI or angioplasty before 65F 55M? No   Social History Narrative    , 3rd-5th grade     Social Determinants of Health     Financial Resource Strain: Not on file   Food Insecurity: Not on file   Transportation Needs: Not on file   Physical Activity: Not on file   Stress: Not on file   Social Connections: Not on file   Intimate Partner Violence: Not At Risk     Fear of Current or Ex-Partner: No     Emotionally Abused: No     Physically Abused: No     Sexually Abused: No   Housing Stability: Not on file       Family History   Problem Relation Age of Onset     Neurologic Disorder Father         MS     Heart Disease Father         irregular  "heart rate     Diabetes Father      Melanoma Father      Sleep Apnea Father      Other Cancer Father      Cancer Maternal Grandfather         lung     Other Cancer Maternal Grandfather      Cancer Paternal Grandmother         stomach     Other Cancer Paternal Grandmother      Cancer Mother      Other Cancer Mother      Thyroid Disease Mother      Gynecology Maternal Aunt         PCOS     Hypertension Maternal Grandmother      Anxiety Disorder Maternal Grandmother      Thyroid Disease Maternal Grandmother      Anxiety Disorder Sister        Allergies   Allergen Reactions     Nkda [No Known Drug Allergies]        MEDICATIONS:    calcium gluconate  1 g Intravenous Once     gabapentin  100 mg Oral TID     [Held by provider] metoprolol succinate ER  25 mg Oral Daily     miconazole   Topical BID     piperacillin-tazobactam  3.375 g Intravenous Q8H     [Held by provider] rivaroxaban ANTICOAGULANT  15 mg Oral BID w/meals     sertraline  100 mg Oral Daily     sodium chloride (PF)  10-40 mL Intracatheter Q7 Days     sodium chloride (PF)  3 mL Intracatheter Q8H     sodium polystyrene  15 g Oral Once     vancomycin place zarate - receiving intermittent dosing  1 each Intravenous See Admin Instructions         PHYSICAL EXAM    BP 95/62   Pulse 96   Temp 98.6  F (37  C) (Oral)   Resp 18   Ht 1.626 m (5' 4\")   Wt (!) 159.7 kg (352 lb)   LMP 02/19/2022 (Exact Date)   SpO2 96%   Breastfeeding No   BMI 60.42 kg/m        Intake/Output Summary (Last 24 hours) at 2/21/2022 1006  Last data filed at 2/21/2022 0805  Gross per 24 hour   Intake --   Output 870 ml   Net -870 ml       Alert/oriented x 3; awake and NAD, super morbidly obese  HEENT NC/AT; perrla; OP clear without lesions; mmm  Neck supple without LAD, TM  CV; RRR without rub or murmur  Lung: clear and equal; no extra sounds  Ab: Large abdominal pannus, soft and NT; not distended; normal bs  : Brar catheter in place, making zarina-colored urine.  Ext: ++ LE edema and " well perfused  Skin; erythema of anterior shins of is a cobblestone appearance consistent with stasis dermatitis  Neuro; grossly intact    LABORATORIES    Recent Labs   Lab 02/20/22 2122   WBC 14.1*   HGB 15.5   HCT 51.4*        Recent Labs   Lab 02/21/22  0626 02/20/22 2122 02/17/22  0538    134* 138   CO2 17* 21* 27   BUN 41* 47* 17   ALKPHOS  --  66  --    ALT  --  19  --    AST  --  31  --      No results for input(s): INR, PTT in the last 168 hours.    Invalid input(s): APTT  Invalid input(s): FERRITIN  No results for input(s): IRON in the last 168 hours.    Invalid input(s): TIBC    I reviewed all labs    ASSESSMENT/PLAN:  46 year old female with past medical history significant for morbid obesity, lesser congestive heart failure, chronic lymphedema of lower extremities, hypertension and multiple sclerosis who was recently 02/08-02/18/2022 admitted for COVID-19 infection, acute hypoxic respiratory failure and congestive heart failure exacerbation.  Patient presented for further evaluation of inability to urinate.  Patient was found to have sepsis and acute kidney injury.  Nephrology service consulted for acute kidney injury.    Acute renal failure-likely multifactorial secondary to prerenal etiology from septic shock in settings of diuretics and ACE inhibitor's use and obstructive uropathy.  Patient baseline serum creatinine 0.8 mg/dL.  Patient presents with creatinine of 4.46 mg/dL.  Serum creatinine is trending down to 2.6 mg/dL .  Urine output in nonoliguric range, patient put out 1470 cc since midnight.  Patient is history of intermittent microscopic hematuria he is going back to June 2011.  Likely secondary to nephrolithiasis.  Urinalysis on February 80,022 showed 22 red blood cells per hpf, no proteinuria.  Urinalysis in March 2021 showed 120 g/g of protein and 22 red blood cells per hpf.  Check UA, UPCR  Awaiting for urology input regarding possible cystoscopy with a right ureteral stent  placement.  No indication for initiation of dialysis.    Continue to hold prior to admission Bumex and lisinopril  Monitor renal function and urine output daily  Avoid nephrotoxins and renally dose medications.    Obstructive uropathy-CT abdomen pelvis showed 8 x 7 by extremity of proximal right ureteral stone with mild hydronephrosis.  Awaiting for urology input.    Hyperkalemia-mild.  Likely secondary acute kidney injury and metabolic acidosis.  Status post Kayexalate.  Patient presents with potassium 5.6.  Today serum potassium is trended down to 3.4.    Metabolic acidosis-likely secondary to HUNG.  Serum bicarbonate is trended down from 21 to 17 this morning.  I will hold off on supplementation today. Trend.      Septic shock-etiology unclear.  Chest x-ray showed no evidence of pulmonary infiltrates.  Patient was recently treated for lower extremity cellulitis and discharged on cefuroxime.  Blood cultures pending.  Patient remains  on Levophed drip but weaning off.  On broad-spectrum IV antibiotics.    Consider to obtain urine cultures.  Continue to hold prior to admission lisinopril and Bumex  Management as per ICU team    Volume status-hard to assess given patient body habitus.  Patient with history of diastolic just of heart failure.  Patient reports increased lower extremity edema last 2 days.  Prior admission was taken 2 mg of Bumex twice a day.  Continue to hold prior to admission diuretics given acute kidney injury.  Monitor ins and outs  -Daily weight  Low-sodium diet    History of hypertension-prior to admission patient was taken  Lisinopril 10 mg daily, metoprolol succinate 25 mg daily and Bumex 2 mg BID. PTA antihypertensive medications on hold.    Hypercalcemia-mild.  Patient had a serum calcium of 11 on admission.  Today serum calcium improved to 7.5 with hydration.      History of hypomagnesemia-prior to mission was taken magnesium oxide 400 g twice daily.  Mg level was wnl at 2.4 on 02/0.  Continue  to hold prior to admission exam supplements given HUNG.        History of pulmonary embolism-on chronic anticoagulation with Xarelto    History of sleep apnea on CPAP at night    Jodee Clark MD  [facility]  [address] [place] [address], [address]  [place] [address]  Phone# [phone]  Fax# [phone]

## 2022-02-21 NOTE — ED NOTES
Bed: JNED-09  Expected date: 2/20/22  Expected time: 8:22 PM  Means of arrival: Ambulance  Comments:  46F decreased urine output

## 2022-02-21 NOTE — CONSULTS
"ICU NOTE:    2/20/2022  8:42 PM    Clinically Significant Risk Factors Present on Admission          # Hypocalcemia: Ca = 7.3 mg/dL (Ref range: 8.5 - 10.5 mg/dL) and/or iCa = N/A on admission, will replace as needed    # Acute Kidney Injury, unspecified: based on a >150% increase in creatinine on admission, will monitor renal function  # Coagulation Defect: home medication list includes an anticoagulant medication   # Circulatory Shock: currently requiring pressors for blood pressure support   # Severe Obesity: Estimated body mass index is 60.42 kg/m  as calculated from the following:    Height as of this encounter: 1.626 m (5' 4\").    Weight as of this encounter: 159.7 kg (352 lb).        Assessment/Plan:    Bess Enamorado is a 46 year old female with medical history significant for HFpEF, history of PE/on Xarelto, chronic lower extremity lymphedema, NARCISA, history of cellulitis, and morbid obesity.  Patient was recently hospitalized from 2/8/2022 to 2/18/2022 for COVID-19, acute respiratory failure, and CHF exacerbation.  She was recovering at [facility], and was found to have decreased urine output.  She was brought to [facility] Emergency Room and was evaluated for HUNG and hypotension requiring pressors.        Neuro:    No acute issues    Avoid narcotics if possible given hypotension    Pain control for obstructing right ureteral stone    Tylenol prn    Pulmonary:    History of NARCISA, patient states she wears CPAP for sleep, but is not on supplemental oxygen otherwise.    Goal SpO2 of 92% or greater    Continue CPAP for sleep    CXR benign      Cardiac:    Hypotension secondary to circulatory shock    On recent admission patent was aggressively diuresed to 16 liters. Now with obstructing stone likely causing more renal dysfunction and hypotension    Continue on Levophed for goal MAP > 65    ECHO on 2/9 showed preserved EF. Mildly decreased right ventricular systolic function.      Lactic acid level WNL, no " need to trend    History of hypertension. Lisinopril and Metoprolol on hold     GI:    No acute issues    NPO for now in case of needing procedures today for obstructing ureteral stone    Renal:    HUNG secondary to obstructing right ureteral stone with mild hydronephrosis     Normal Left Ureter and Kidney    Creatinine normal at baseline.  4.46 on admission, down to 2.76 this am with improved perfusion with IVF and pressor    Continue to trend renal function    Urology and Nephrology consulted    CO2 17 on BMP this am.  Will not start Bicarb gtt at this point.      Hyperkalemia on admission, given dose of Kayexalate with resolution.  Now slightly hypokalemic.  Will hold replacements given HUNG    Hypocalcemia, replace with 1 gm vandana    ID:    Blood cultures times 2 in process.    Urine showing few bacteria and blood    Started on Zosyn and Vanco given recent admission.  Will continue Zosyn for now    I think hypotension is more of a fluid issue, with over-diuresis.  Also lack of renal perfusion given stone.      Procal 0.15, lactate normal.  WBC ct 14.1    Heme/Coag:    Patent is anticoagulated on Xarelto.  This is for history of PE.  Will need to hold this morning in case of procedures.    Does not look like patent got a dose last evening    Endocrine:    No acute issues      ICU Prophylaxis:    PPI:  Not required    Peridex:  Not required     Anticoagulation/DVT Prophylaxis:  Xarelto on hold due to possible procedure today.         Lines/Drains/Tubes:  Peripheral IV 02/20/22 Right Hand    Peripheral IV 02/20/22 Left Lower forearm    PICC Triple Lumen 02/21/22 Right Brachial vein medial Vasporessors    External Urinary Catheter         CODE STATUS:  FULL      SUBJECTIVE:    Ccx:  No current complaints     HPI:  Bess Enamorado is a 46 year old female with medical history sign ificant for HFpEF, history of PE/on Xarelto, chronic lower extremity lymphedema, NARCISA, history of cellulitis, and morbid obesity.  Patient was  recently hospitalized from 2/8/2022 to 2/18/2022 for COVID-19, acute respiratory failure, and CHF exacerbation.  She was recovering at [facility], and was found to have decreased urine output.  She was brought to [facility] Emergency Room and was evaluated for HUNG and hypotension requiring pressors.    Patient started having decreased UOP on 2/19, and no urine 2/20.  She was taking her Bumex as prescribed.  No other complaints.  She is currently in no pain and is having no nausea or vomiting.  Denies shortness of breath or chest pain.         Past Medical History:   Diagnosis Date     Acute on chronic diastolic congestive heart failure (H) 2/9/2022     Arthritis 2019    Hips     ASCUS favor benign 8/2015    Neg HPV     Depressive disorder 2010     H/O colposcopy with cervical biopsy 9/14/15    Bx & ECC - negative     Hypertension 2019     LSIL on Pap smear 7/2014    Neg high risk HPV     Multiple sclerosis (H) 8/29/2005 9/18/200pt dx with MS 2004--dx by neurolgist and is followed by Dr. Steven Ayala 10/2016     UNSPEC CONSTIPATION 9/18/2006 9/18/2006   Has tried otc suppository and otc lax.      Past Surgical History:   Procedure Laterality Date     CHOLECYSTECTOMY, LAPOROSCOPIC      Cholecystectomy, Laparoscopic     COLONOSCOPY  2007?    Bathroom issue..results normal     OPEN REDUCTION INTERNAL FIXATION TOE(S)  4/13/2012    Procedure:OPEN REDUCTION INTERNAL FIXATION TOE(S); Open reduction internal fixation right proximal fifth metatarsal fracture-   Anes-choice block; Surgeon:LEY, JEFFREY DUANE; Location:[place] OR     PICC TRIPLE LUMEN PLACEMENT  2/21/2022          Current Facility-Administered Medications   Medication     acetaminophen (TYLENOL) tablet 650 mg    Or     acetaminophen (TYLENOL) Suppository 650 mg     acetaminophen (TYLENOL) tablet 650 mg     gabapentin (NEURONTIN) capsule 100 mg     HYDROmorphone (DILAUDID) injection 0.2 mg     lidocaine (LMX4) cream     lidocaine (LMX4) cream      lidocaine 1 % 0.1-1 mL     lidocaine 1 % 0.1-5 mL     melatonin tablet 1 mg     [Held by provider] metoprolol succinate ER (TOPROL-XL) 24 hr tablet 25 mg     miconazole (MICATIN) 2 % powder     norepinephrine (LEVOPHED) 4 mg in  mL infusion PREMIX     ondansetron (ZOFRAN-ODT) ODT tab 4 mg    Or     ondansetron (ZOFRAN) injection 4 mg     Patient is already receiving anticoagulation with heparin, enoxaparin (LOVENOX), warfarin (COUMADIN)  or other anticoagulant medication     piperacillin-tazobactam (ZOSYN) 3.375 g vial to attach to  mL bag     [Held by provider] rivaroxaban ANTICOAGULANT (XARELTO) tablet 15 mg     sertraline (ZOLOFT) tablet 100 mg     sodium chloride (PF) 0.9% PF flush 10-20 mL     sodium chloride (PF) 0.9% PF flush 10-40 mL     sodium chloride (PF) 0.9% PF flush 10-40 mL     sodium chloride (PF) 0.9% PF flush 10-40 mL     sodium chloride (PF) 0.9% PF flush 3 mL     sodium chloride (PF) 0.9% PF flush 3 mL     sodium polystyrene (KAYEXALATE) suspension 15 g     vancomycin place zarate - receiving intermittent dosing        Allergies   Allergen Reactions     Nkda [No Known Drug Allergies]      Family History   Problem Relation Age of Onset     Neurologic Disorder Father         MS     Heart Disease Father         irregular heart rate     Diabetes Father      Melanoma Father      Sleep Apnea Father      Other Cancer Father      Cancer Maternal Grandfather         lung     Other Cancer Maternal Grandfather      Cancer Paternal Grandmother         stomach     Other Cancer Paternal Grandmother      Cancer Mother      Other Cancer Mother      Thyroid Disease Mother      Gynecology Maternal Aunt         PCOS     Hypertension Maternal Grandmother      Anxiety Disorder Maternal Grandmother      Thyroid Disease Maternal Grandmother      Anxiety Disorder Sister          PHYSICAL ASSESSMENT:  General: appears stated age, alert, cooperative  Lungs:  CTA  Heart: RRR, S1 and S2   Abdomen: Soft,  non-tender.  Bowel sounds present X 4 quadrants.  Skin: skin color normal, texture normal, no rashes or lesions.   Extremities:  Lymphedema BLE. Reddened, scaling.    Pulses:  +2 BUE, BLE  Neuro:  Grossly intact.  Denies pain.     Temp: 98.6  F (37  C) Temp src: Oral BP: 95/62 Pulse: 96   Resp: 18 SpO2: 96 % O2 Device: Nasal cannula Oxygen Delivery: 2 LPM        Intake/Output Summary (Last 24 hours) at 2/21/2022 0830  Last data filed at 2/21/2022 0805  Gross per 24 hour   Intake --   Output 870 ml   Net -870 ml     Temp: 98.6  F (37  C) Temp src: Oral BP: 95/62 Pulse: 96   Resp: 18 SpO2: 96 % O2 Device: Nasal cannula Oxygen Delivery: 2 LPM      Lab Results   Component Value Date    WBC 14.1 02/20/2022    WBC 7.3 03/07/2021     Lab Results   Component Value Date    RBC 5.77 02/20/2022    RBC 4.91 03/07/2021     Lab Results   Component Value Date    HGB 15.5 02/20/2022    HGB 12.5 03/07/2021     Lab Results   Component Value Date    HCT 51.4 02/20/2022    HCT 42.8 03/07/2021     No components found for: MCT  Lab Results   Component Value Date    MCV 89 02/20/2022    MCV 87 03/07/2021     Lab Results   Component Value Date    MCH 26.9 02/20/2022    MCH 25.5 03/07/2021     Lab Results   Component Value Date    MCHC 30.2 02/20/2022    MCHC 29.2 03/07/2021     Lab Results   Component Value Date    RDW 15.7 02/20/2022    RDW 15.6 03/07/2021     Lab Results   Component Value Date     02/20/2022     03/08/2021       Last Comprehensive Metabolic Panel:  Sodium   Date Value Ref Range Status   02/21/2022 136 136 - 145 mmol/L Final   03/07/2021 139 133 - 144 mmol/L Final     Potassium   Date Value Ref Range Status   02/21/2022 3.4 (L) 3.5 - 5.0 mmol/L Final   03/07/2021 4.3 3.4 - 5.3 mmol/L Final     Chloride   Date Value Ref Range Status   02/21/2022 106 98 - 107 mmol/L Final   03/07/2021 105 94 - 109 mmol/L Final     Carbon Dioxide   Date Value Ref Range Status   03/07/2021 30 20 - 32 mmol/L Final     Carbon  Dioxide (CO2)   Date Value Ref Range Status   02/21/2022 17 (L) 22 - 31 mmol/L Final     Anion Gap   Date Value Ref Range Status   02/21/2022 13 5 - 18 mmol/L Final   03/07/2021 4 3 - 14 mmol/L Final     Glucose   Date Value Ref Range Status   02/21/2022 108 70 - 125 mg/dL Final   03/07/2021 99 70 - 99 mg/dL Final     Urea Nitrogen   Date Value Ref Range Status   02/21/2022 41 (H) 8 - 22 mg/dL Final   03/07/2021 12 7 - 30 mg/dL Final     Creatinine   Date Value Ref Range Status   02/21/2022 2.76 (H) 0.60 - 1.10 mg/dL Final   03/07/2021 0.62 0.52 - 1.04 mg/dL Final     GFR Estimate   Date Value Ref Range Status   02/21/2022 21 (L) >60 mL/min/1.73m2 Final     Comment:     Effective December 21, 2021 eGFRcr in adults is calculated using the 2021 CKD-EPI creatinine equation which includes age and gender (Maria Teresa et al., NEJ, DOI: 10.1056/CQLJri3136747)   03/07/2021 >90 >60 mL/min/[1.73_m2] Final     Comment:     Non  GFR Calc  Starting 12/18/2018, serum creatinine based estimated GFR (eGFR) will be   calculated using the Chronic Kidney Disease Epidemiology Collaboration   (CKD-EPI) equation.       Calcium   Date Value Ref Range Status   02/21/2022 7.3 (L) 8.5 - 10.5 mg/dL Final   03/07/2021 9.4 8.5 - 10.1 mg/dL Final       Last Arterial Blood Gas:  pH Arterial   Date Value Ref Range Status   02/08/2022 7.46 (H) 7.37 - 7.44 Final     pCO2 Arterial   Date Value Ref Range Status   02/08/2022 34 (L) 35 - 45 mm Hg Final     pO2 Arterial   Date Value Ref Range Status   02/08/2022 63 (L) 80 - 90 mm Hg Final     Bicarbonate Arterial   Date Value Ref Range Status   02/08/2022 25 23 - 29 mmol/L Final     O2 Sat, Arterial   Date Value Ref Range Status   02/08/2022 94.2 (L) 96.0 - 97.0 % Final     Base Excess/Deficit (+/-)   Date Value Ref Range Status   02/08/2022 0.0   mmol/L Final       Troponin I ES   Date Value Ref Range Status   03/04/2021 <0.015 0.000 - 0.045 ug/L Final     Comment:     The 99th percentile for  upper reference range is 0.045 ug/L.  Troponin values   in the range of 0.045 - 0.120 ug/L may be associated with risks of adverse   clinical events.     EXAM: CT ABDOMEN PELVIS W/O CONTRAST  LOCATION: [facility]  DATE/TIME: 2/21/2022 5:23 AM     INDICATION: Diminished urine output. Acute renal failure and hypotension.  COMPARISON: CTA chest 02/08/2022. CT abdomen and pelvis 10/22/2018.  TECHNIQUE: CT scan of the abdomen and pelvis was performed without IV contrast. Multiplanar reformats were obtained. Dose reduction techniques were used.  CONTRAST: None.     FINDINGS:   LOWER CHEST: Normal.     HEPATOBILIARY: Hepatic steatosis. Postcholecystectomy. No biliary dilatation.     PANCREAS: Normal.     SPLEEN: Normal.     ADRENAL GLANDS: Normal.     KIDNEYS/BLADDER: 8 x 7 x 3 mm stone in the proximal right ureter causes mild hydronephrosis. Normal left kidney and bladder.     BOWEL: Colonic diverticulosis. No bowel obstruction or inflammation. Normal appendix.     LYMPH NODES: Normal.     VASCULATURE: Unremarkable.     PELVIC ORGANS: No change in the 9.4 cm left adnexal mass which has fat, soft tissue, and calcified components.     MUSCULOSKELETAL: Normal.                                                                      IMPRESSION:   1.  8 x 7 x 3 mm stone in the proximal right ureter causes mild hydronephrosis.  2.  Colonic diverticulosis.  3.  Hepatic steatosis.  4.  No significant change in the 9.4 cm left ovarian dermoid.      EXAM: XR CHEST PORT 1 VIEW  LOCATION: [facility]  DATE/TIME: 2/20/2022 9:43 PM     INDICATION: Mildly increased dyspnea, suspect hypervolemia  COMPARISON: Chest CTA 02/08/2022, 03/05/2021, chest x-ray 03/04/2021                                                                      IMPRESSION: Large body habitus. Shallow inspiration with crowding of pulmonary vessels. No signs of pneumonia or failure. Heart and pulmonary vascularity are  normal.    JULIETA Hernandez, CNP  Pulmonary Critical Care  Pager: [phone]    Time Billed:  60 Minutes     Patient requiring ICU level of care: Patient requiring vasopressors to maintain adequate blood pressures.  High risk for further hemodynamic compromise.

## 2022-02-21 NOTE — ED NOTES
PICC line inserted by PICC RN. Approval given for use of PICC. IV NS infusion and Levophed infusion started to PICC. Blood pressure improving. Pt awaiting available inpatient bed. Pt repositioned for comfort.

## 2022-02-21 NOTE — PROGRESS NOTES
Patient admitted to ICU for complex care   Hypotension, suspected sepsis, sleep apnea   Intensivist managing primarily   Will follow along

## 2022-02-21 NOTE — PHARMACY-ADMISSION MEDICATION HISTORY
Pharmacy Note - Admission Medication History    Pertinent Provider Information: medication regimen remains the same as discharge two days ago.      ______________________________________________________________________    Prior To Admission (PTA) med list completed and updated in EMR.       PTA Med List   Medication Sig Note Last Dose     acetaminophen (TYLENOL) 325 MG tablet Take 650 mg by mouth every 6 hours as needed for mild pain        bumetanide (BUMEX) 2 MG tablet Take 1 tablet (2 mg) by mouth 2 times daily  2/20/2022 at pm     cefuroxime (CEFTIN) 500 MG tablet Take 1 tablet (500 mg) by mouth every 12 hours  2/20/2022 at pm     Cholecalciferol (VITAMIN D3 PO) Take 10,000 Units by mouth daily   2/20/2022 at am     [START ON 3/5/2022] desogestrel-ethinyl estradiol (APRI) 0.15-30 MG-MCG tablet TAKE 1 TABLET DAILY CONTINUOUSLY-OMIT PLACEBPO TABS UNTIL AFTER 3 MONTHS OF HORMONE TABS 2/20/2022: Hold until Xarelto load complete Past Month     gabapentin (NEURONTIN) 100 MG capsule TAKE 2 CAPSULES(200 MG) BY MOUTH THREE TIMES DAILY  2/20/2022 at x2 doses     lisinopril (ZESTRIL) 10 MG tablet Take 1 tablet (10 mg) by mouth daily  2/20/2022 at am     magnesium oxide (MAG-OX) 400 MG tablet Take 1 tablet (400 mg) by mouth 2 times daily  2/20/2022 at pm     metoprolol succinate ER (TOPROL-XL) 25 MG 24 hr tablet TAKE 1 TABLET(25 MG) BY MOUTH DAILY  2/20/2022 at am     miconazole (MICATIN) 2 % external powder Apply topically 2 times daily  2/20/2022 at pm     rivaroxaban ANTICOAGULANT (XARELTO) 15 MG TABS tablet Take 1 tablet (15 mg) by mouth 2 times daily (with meals) for 15 days  2/20/2022 at x 2 doses      [START ON 3/4/2022] rivaroxaban ANTICOAGULANT (XARELTO) 20 MG TABS tablet Take 1 tablet (20 mg) by mouth daily (with dinner)       sertraline (ZOLOFT) 100 MG tablet Take 1 tablet (100 mg) by mouth daily  2/20/2022 at am       Information source(s): Patient, Clinic records, Hospital records and  Osiel[facility]Tessa  Method of interview communication: in-person    Summary of Changes to PTA Med List  New: nothing   Discontinued: nothing   Changed: nothing     Patient was asked about OTC/herbal products specifically.  PTA med list reflects this.    In the past week, patient estimated taking medication this percent of the time:  greater than 90%.    Allergies were reviewed, assessed, and updated with the patient.      Patient does not use any multi-dose medications prior to admission.    The information provided in this note is only as accurate as the sources available at the time of the update(s).    Thank you for the opportunity to participate in the care of this patient.    Alexandre Rivers [facility]  2/20/2022 11:03 PM

## 2022-02-21 NOTE — PROCEDURES
"PICC Line Insertion Procedure Note  Pt. Name: Bess Enamorado  MRN:        [id number]    Procedure: Insertion of a  triple Lumen  5 fr  Bard SOLO (valved) Power PICC, Lot number [id number]    Indications: Vasopressors    Contraindications : none    Procedure Details   Patient identified with 2 identifiers and \"Time Out\" conducted.  .     Central line insertion bundle followed: hand hygeine performed prior to procedure, site cleansed with cholraprep, hat, mask, sterile gloves,sterile gown worn, patient draped with maximum barrier head to toe drape, sterile field maintained.    The vein was assessed and found to be compressible and of adequate size. 3 ml 1% Lidocaine administered sq to the insertion site. A 5 Fr PICC was inserted into the brachial vein of the right arm with ultrasound guidance. 1 attempt(s) required to access vein.   Catheter threaded without difficulty. Good blood return noted.    Modified Seldinger Technique used for insertion.    The 8 sharps that are included in the PICC insertion kit were accounted for and disposed of in the sharps container prior to breakdown of the sterile field.    Catheter secured with Statlock, biopatch and Tegaderm dressing applied.    Findings:  Total catheter length  46 cm, with 0 cm exposed. Mid upper arm circumference is 48 cm. Catheter was flushed with 30 cc NS. Patient  tolerated procedure well.    Tip placement verified by 3CG Tip Positioning System. Tip placement in the SVC.    CLABSI prevention brochure left at bedside.    Patient's primary RN notified PICC is ready for use.    Comments:                "

## 2022-02-21 NOTE — CONSULTS
Care Management Initial Consult    General Information  Assessment completed with: Patient,    Type of CM/SW Visit: Initial Assessment    Primary Care Provider verified and updated as needed: Yes   Readmission within the last 30 days: current reason for admission unrelated to previous admission   Return Category: New Diagnosis     Advance Care Planning: Advance Care Planning Reviewed:  (states she has forms)          Communication Assessment  Patient's communication style: spoken language (English or Bilingual)    Hearing Difficulty or Deaf: no   Wear Glasses or Blind: no    Cognitive  Cognitive/Neuro/Behavioral:                        Living Environment:   People in home: alone     Current living Arrangements: house      Able to return to prior arrangements: yes       Family/Social Support:  Care provided by: self  Provides care for: no one  Marital Status: Single  Sibling(s)          Description of Support System: Supportive, Involved         Current Resources:   Patient receiving home care services: Yes  Skilled Home Care Services: Skilled Nursing, Home Health Aid, Physicial Therapy, Occupational Therapy  Community Resources: Acute Rehab, PCA  Equipment currently used at home: commode chair, walker, rolling (lift recliner chair)  Supplies currently used at home: Oxygen Tubing/Supplies    Employment/Financial:  Employment Status:          Financial Concerns:             Lifestyle & Psychosocial Needs:  Social Determinants of Health     Tobacco Use: Low Risk      Smoking Tobacco Use: Never Smoker     Smokeless Tobacco Use: Never Used   Alcohol Use: Not on file   Financial Resource Strain: Not on file   Food Insecurity: Not on file   Transportation Needs: Not on file   Physical Activity: Not on file   Stress: Not on file   Social Connections: Not on file   Intimate Partner Violence: Not At Risk     Fear of Current or Ex-Partner: No     Emotionally Abused: No     Physically Abused: No     Sexually Abused: No    Depression: Not at risk     PHQ-2 Score: 0   Housing Stability: Not on file       Functional Status:  Prior to admission patient needed assistance:   Dependent ADLs:: Independent, Ambulation-walker  Dependent IADLs:: Cleaning, Cooking, Laundry, Shopping, Meal Preparation, Transportation                    Additional Information:  Assessment completed with patient.  This is a readmission from hospitalization 2/8/22-2/18/22. Patient states she lives alone in her house. She has home RN/PT/OT/HHA with [facility]. She has home oxygen from [facility] which she was using only at night. Patient uses a walker to ambulate and does not drive.  For transportation she has her sister or friend provide rides.  She has PCA hours and currently her sister is her PCA. Transportation TBD.     Final discharge plan pending progression and recommendations.     Wen Burrell RN

## 2022-02-21 NOTE — ED TRIAGE NOTES
EMS arrival Note  Pt called 911 to her home in [place], [place] for inability to urinate. Pt was discharged from [facility] on Friday 2/18/22 after admission for pneumonia, COVID. Pt complains of increased swelling to Left leg. Pt is on fluid restriction 1500ml. Pt is taking Bumex, Xarelto, Lisinopril. Pt reports hx small pulmonary embolism, lymphedema.   When EMS found pt in her wheelchair O2 sats 95%. Pt was able to stand and pivot to EMS cot and O2 sats went to 85%. Pt was placed on 3lpm O2 via nasal canulla. Pt normally wear O2 at night 2lpm O2 with CPAP.     EMS vital signs   Heart rate 110bpm, /60 blood glucose 116gm/dl.   
none

## 2022-02-21 NOTE — PROGRESS NOTES
Clinic Care Coordination Contact  Ambulatory Care Coordination to Inpatient Care Management   Hand-In Communication    Date:  February 21, 2022  Name: Bess Enamorado is enrolled in Ambulatory Care Coordination program and I am the Lead Care Coordinator.  CC Contact Information: Epic InThreatMetrixsket + phone  Payor Source: Payor: [employer] / Plan: [employer] / Product Type: HMO /   Current services in place:     Please see the CC Snaphot and Care Management Flowsheets for specific  details of this Bess Enamorado care plan.   Additional details/specific concerns r/t this admission:    Patient is new to clinic care coordination  No additional information to share     I will follow this admission in Epic. Please feel free to contact me with questions or for further collaboration in discharge planning.  [facility]   Meenakshi Campbell RN, Care Coordinator   [facility]'s   E-mail [email or web address]   [phone]

## 2022-02-21 NOTE — ED PROVIDER NOTES
"ED SIGNOUT  Date/Time:2/20/2022 11:47 PM    Patient signed out to me by my colleague, Dr. Connelly.  Please see their note for complete history and physical. Plan to follow up on vital signs and disposition.    The creation of this record is based on the scribe s observations of the work being performed by Lowell Levy and the provider s statements to them. It was created on their behalf by Patrick Fu a trained medical scribe. This document has been checked and approved by the attending provider.      REMAINING ED WORKUP:    Vitals:  /54   Pulse 101   Temp 98.6  F (37  C) (Oral)   Resp 21   Ht 1.626 m (5' 4\")   Wt (!) 159.7 kg (352 lb)   LMP 02/19/2022 (Exact Date)   SpO2 97%   Breastfeeding No   BMI 60.42 kg/m        Pertinent labs results reviewed   Results for orders placed or performed during the hospital encounter of 02/20/22   XR Chest Port 1 View    Impression    IMPRESSION: Large body habitus. Shallow inspiration with crowding of pulmonary vessels. No signs of pneumonia or failure. Heart and pulmonary vascularity are normal.   Basic metabolic panel   Result Value Ref Range    Sodium 134 (L) 136 - 145 mmol/L    Potassium 5.6 (H) 3.5 - 5.0 mmol/L    Chloride 92 (L) 98 - 107 mmol/L    Carbon Dioxide (CO2) 21 (L) 22 - 31 mmol/L    Anion Gap 21 (H) 5 - 18 mmol/L    Urea Nitrogen 47 (H) 8 - 22 mg/dL    Creatinine 4.46 (H) 0.60 - 1.10 mg/dL    Calcium 11.0 (H) 8.5 - 10.5 mg/dL    Glucose 97 70 - 125 mg/dL    GFR Estimate 12 (L) >60 mL/min/1.73m2   B-Type Natriuretic Peptide ([facility] Only)   Result Value Ref Range    BNP 64 0 - 67 pg/mL   Result Value Ref Range    Magnesium 2.4 1.8 - 2.6 mg/dL   Troponin I (now)   Result Value Ref Range    Troponin I 0.05 0.00 - 0.29 ng/mL   CBC with platelets and differential   Result Value Ref Range    WBC Count 14.1 (H) 4.0 - 11.0 10e3/uL    RBC Count 5.77 (H) 3.80 - 5.20 10e6/uL    Hemoglobin 15.5 11.7 - 15.7 g/dL    Hematocrit 51.4 (H) 35.0 - 47.0 %    " MCV 89 78 - 100 fL    MCH 26.9 26.5 - 33.0 pg    MCHC 30.2 (L) 31.5 - 36.5 g/dL    RDW 15.7 (H) 10.0 - 15.0 %    Platelet Count 322 150 - 450 10e3/uL    % Neutrophils 75 %    % Lymphocytes 14 %    % Monocytes 8 %    % Eosinophils 1 %    % Basophils 1 %    % Immature Granulocytes 1 %    NRBCs per 100 WBC 0 <1 /100    Absolute Neutrophils 10.8 (H) 1.6 - 8.3 10e3/uL    Absolute Lymphocytes 2.0 0.8 - 5.3 10e3/uL    Absolute Monocytes 1.1 0.0 - 1.3 10e3/uL    Absolute Eosinophils 0.2 0.0 - 0.7 10e3/uL    Absolute Basophils 0.1 0.0 - 0.2 10e3/uL    Absolute Immature Granulocytes 0.1 <=0.4 10e3/uL    Absolute NRBCs 0.0 10e3/uL   Lactic acid whole blood   Result Value Ref Range    Lactic Acid 1.8 0.7 - 2.0 mmol/L   Hepatic panel   Result Value Ref Range    Bilirubin Total 1.8 (H) 0.0 - 1.0 mg/dL    Bilirubin Direct 0.5 <=0.5 mg/dL    Protein Total 8.1 (H) 6.0 - 8.0 g/dL    Albumin 3.7 3.5 - 5.0 g/dL    Alkaline Phosphatase 66 45 - 120 U/L    AST 31 0 - 40 U/L    ALT 19 0 - 45 U/L     Pertinent imaging reviewed   Please see official radiology report.  XR Chest Port 1 View   Final Result   IMPRESSION: Large body habitus. Shallow inspiration with crowding of pulmonary vessels. No signs of pneumonia or failure. Heart and pulmonary vascularity are normal.      CT Abdomen Pelvis w/o Contrast    (Results Pending)        Interventions  Medications   lidocaine 1 % 0.1-5 mL (3 mLs Other Given 2/21/22 0020)   lidocaine (LMX4) cream (has no administration in time range)   sodium chloride (PF) 0.9% PF flush 10-40 mL (has no administration in time range)   norepinephrine (LEVOPHED) 4 mg in  mL infusion PREMIX (0.04 mcg/kg/min × 159.7 kg Intravenous Rate/Dose Change 2/21/22 0311)   sodium chloride (PF) 0.9% PF flush 10-20 mL (has no administration in time range)   sodium chloride (PF) 0.9% PF flush 10-40 mL (10 mLs Intracatheter Given 2/21/22 0359)   sodium chloride (PF) 0.9% PF flush 10-40 mL (has no administration in time range)    acetaminophen (TYLENOL) tablet 650 mg (has no administration in time range)   gabapentin (NEURONTIN) capsule 100 mg (has no administration in time range)   metoprolol succinate ER (TOPROL-XL) 24 hr tablet 25 mg ( Oral Automatically Held 2/24/22 0800)   miconazole (MICATIN) 2 % powder (has no administration in time range)   rivaroxaban ANTICOAGULANT (XARELTO) tablet 15 mg (has no administration in time range)   sertraline (ZOLOFT) tablet 100 mg (has no administration in time range)   lidocaine 1 % 0.1-1 mL (has no administration in time range)   lidocaine (LMX4) cream (has no administration in time range)   sodium chloride (PF) 0.9% PF flush 3 mL (3 mLs Intracatheter Given 2/21/22 0458)   sodium chloride (PF) 0.9% PF flush 3 mL (has no administration in time range)   melatonin tablet 1 mg (has no administration in time range)   Patient is already receiving anticoagulation with heparin, enoxaparin (LOVENOX), warfarin (COUMADIN)  or other anticoagulant medication (has no administration in time range)   acetaminophen (TYLENOL) tablet 650 mg (has no administration in time range)     Or   acetaminophen (TYLENOL) Suppository 650 mg (has no administration in time range)   ondansetron (ZOFRAN-ODT) ODT tab 4 mg (has no administration in time range)     Or   ondansetron (ZOFRAN) injection 4 mg (has no administration in time range)   sodium polystyrene (KAYEXALATE) suspension 15 g (0 g Oral Hold 2/21/22 0348)   piperacillin-tazobactam (ZOSYN) 3.375 g vial to attach to  mL bag (0 g Intravenous Stopped 2/21/22 0439)     Followed by   piperacillin-tazobactam (ZOSYN) 3.375 g vial to attach to  mL bag (has no administration in time range)   vancomycin (VANCOCIN) 2,500 mg in sodium chloride 0.9 % 500 mL intermittent infusion (has no administration in time range)   0.9% sodium chloride BOLUS (0 mLs Intravenous Stopped 2/21/22 0015)   sodium chloride 0.9% infusion ( Intravenous Rate/Dose Verify 2/21/22 0440)   0.9% sodium  chloride BOLUS (0 mLs Intravenous Stopped 2/21/22 0317)   0.9% sodium chloride BOLUS (0 mLs Intravenous Stopped 2/21/22 7739)      Critical care time 75 minutes excluding separate billable procedures.  Including bedside management, time reviewing tests and past records, discussion with consultants and other providers, and time spent in discussion with patient, family, and/or designated decision maker.    ED Course/MDM:  10:40 PM Signout accepted from Dr. Connelly.  Prior records were reviewed.  Diagnostics from this visit are reviewed.  10:45 PM patient checked in mentating well despite lower blood pressure.  We will continue to recheck blood pressure and fluids getting started.  11:16 PM I introduced myself to the patient.  With low blood pressure PICC line ordered if needs require pressors.  11:40 PM patient reassessed for continued low blood pressure however mentating well and no change in symptoms.  Patient finishing bolus and blood pressure still low.  Patient discussed with hospitalist and if continued low blood pressure will consider starting pressors.  12:30 AM following finishing fluids and continued low blood pressure despite no change in symptoms will start Levophed.  Patient discussed with SOC for placement to ICU.  No ICU beds available here at [facility].  Hospitalist also up updated.  1:30 AM patient discussed with telemetry ICU from Dr. Domingo Maher will trial further fluids given normal EF on last admission and try to wean Levophed.  2:35 AM blood pressure improving with second liter of fluid and will continue weaning Levophed.  If able to wean off Levophed will plan for cardiac telemetry admission here at [facility].  4:50 AM hospitalist attention was updated and will continue to monitor patient.  Patient now producing some urine and will try to obtain a sample though it is very slight.  Also discussed CT scan to evaluate any obstruction causing the anuria.  Patient's blood pressure  improved on fluids and Levophed into the upper 90s-100   Systolically.    Patient signed out after discussion with hospitalist for admission.  Patient's blood pressure was lower on prior providers discussion with hospitalist however previously had been better.  IV fluids started and continue to reassess however patient's clinical presentation and symptoms did not change her blood pressure continued to run low despite 1 L IV fluids.  Discussion with hospitalist during that course and PICC line was placed in order to provide adequate fluid as well as if pressors are needed.  Blood pressure was still low and dropped to 60s systolic.  Patient had no symptoms from this aside from her already stated symptoms of feeling ill and unwell but no new lightheadedness nor episode of syncope.  She was started on Levophed and discussed for ICU admission.  No ICU beds were available at [facility] and I did speak with the tele-ICU  At [facility].  Possible bed in their IMC however will trial further fluids after discussion with intensivist and try to wean Levophed.  Able to wean a very low dose Levophed and after 2 L of blood pressure is improved however on repositioning blood pressure did drop again though patient again had no changing physical findings.  Third liter IV fluids was given and continued low-dose Levophed through the PICC line.  Blood pressure in the upper 90s-100s on systolic check and will reassess and try to wean Levophed with continued fluids.  Hospitalist at [facility] updated we will continue plan for boarding here  in the ER and admission.     1. Acute renal failure, unspecified acute renal failure type (H)    2. Dehydration    3. Hyperkalemia    4. Hypotension due to hypovolemia      Dr. Lowell Levy  [facility] Emergency Department          Lowell Levy MD  02/21/22 5289

## 2022-02-21 NOTE — ED PROVIDER NOTES
EMERGENCY DEPARTMENT ENCOUNTER      NAME: Bess Enamorado  AGE: 46 year old female  YOB: 1975  MRN: [id number]  EVALUATION DATE & TIME: 2022  8:42 PM    PCP: Mikala Luna    ED PROVIDER: Tony Connelly M.D.      Chief Complaint   Patient presents with     Leg Pain     Covid Concern       FINAL IMPRESSION:  1. Acute renal failure, unspecified acute renal failure type (H)    2. Dehydration    3. Hyperkalemia          ED COURSE & MEDICAL DECISION MAKIN year old female presents to the Emergency Department for evaluation of anuria.  Patient recently hospitalized for COVID-19, hypoxia, small pulmonary embolism.  She was discharged on Bumex 2 mg twice daily.  She presents with anuria since yesterday.  She also says she thinks her legs may be a bit more swollen, hard to  in the presence of severe lymphedema.  Her creatinine here is concerning the elevated greater than 4 with some mild associated hyperkalemia.  Her BNP has gone from 2200 to 60 consistent with probable overdiuresis.  Patient's chest x-ray here is clear.  She is initially vitally stable with systolic blood pressures in the 100s, mildly tachycardic.  She does have a mild leukocytosis to 14 noted.  Chest x-ray is clear.  Her legs are not notable for any cellulitic change.  Suspect she will not be able to provide a urine for testing for a little while yet but is not complaining of any dysuria or abdominal pain.  She is being started on IV fluids here.  She did have a couple blood pressures in the 80s systolic.  Given her leukocytosis and now hypotension, added on blood cultures and lactate.  Discussed case with hospitalist.  We are going to continue to observe the patient here in the emergency department for a time to ensure that her blood pressures are stable and that she is appropriate for floor admission.  I am updating the oncoming ED provider Dr. Levy.  Updated the patient on the plan of care she is agreeable    8:46 PM I  met the patient and performed my initial interview and exam.    10:30 PM discussed case with Dr. Liriano, hospitalist.  We are going to administer some IV fluids, check a lactate, and continue to trend blood pressure to ensure patient is stable for admission to the floor and does not require higher level of care.  Also discussed case with Dr. Levy, the oncoming ED provider.        MEDICATIONS GIVEN IN THE EMERGENCY:  Medications   0.9% sodium chloride BOLUS (has no administration in time range)       NEW PRESCRIPTIONS STARTED AT TODAY'S ER VISIT  New Prescriptions    No medications on file          =================================================================    HPI    Patient information was obtained from: Patient and EMS    Use of : N/A         Bess Enamorado is a 46 year old female with a pertinent history of lymphedema, hypertension, obesity, and PE who presents to this ED by EMS for evaluation of urinary retention.     Per chart review: Patient was admitted here from 2/8-2/18 for evaluation of respiratory failure with hypoxia. Angiogram chest showed small nonocclusive right upper lobe pulmonary embolism. Pulmonary arterial hypertension. Right heart dysfunction. New right lung infiltrates compatible with pneumonia. CT head w/o contrast showed No CT finding of a mass, hemorrhage or focal area suggestive of acute infarct. Echo reported EF 65%.  Moderately decreased right ventricular systolic function.  Lasix drip with good response (16L negative balance), transitioned to IV Lasix push on 2/12, cardiology managed diuretics. Was discharged home with follow up with PCP in 7 days.     EMS reported that she was discharged on 2/18 after being admitted for covid pneumonia. Has a history of lymphedema and obesity. Is on a fluid restriction. Has had fluid retention since yesterday. Her oxygen saturation was in the mid 90s upon EMS arrival, with exertion went down to mid 80s and was put on 3L of oxygen upon  arrival to ED. Is on her period.     Patient reports that she was hospitalized recently for covid, pneumonia, PE, and cellulitis. She has had decreased urine output since yesterday around 1200. Notes she has been re accumulating fluid in her legs and abdomen. Both her legs are swollen but her left is greater than right. Was treated for cellulitis in hospital and her legs are not as painful or as warm as they were in the hospital. Is having irregular menses here. Mild bleeding currently. Denies any chest pain, abdominal pain, leg pain, fever, more shortness of breath, or any other complaints at this time.      REVIEW OF SYSTEMS   All systems reviewed and negative except as noted in HPI.    PAST MEDICAL HISTORY:  Past Medical History:   Diagnosis Date     Acute on chronic diastolic congestive heart failure (H) 2/9/2022     Arthritis 2019    Hips     ASCUS favor benign 8/2015    Neg HPV     Depressive disorder 2010     H/O colposcopy with cervical biopsy 9/14/15    Bx & ECC - negative     Hypertension 2019     LSIL on Pap smear 7/2014    Neg high risk HPV     Multiple sclerosis (H) 8/29/2005 9/18/200pt dx with MS 2004--dx by neurolgist and is followed by Dr. Steven Ayala 10/2016     UNSPEC CONSTIPATION 9/18/2006 9/18/2006   Has tried otc suppository and otc lax.        PAST SURGICAL HISTORY:  Past Surgical History:   Procedure Laterality Date     CHOLECYSTECTOMY, LAPOROSCOPIC      Cholecystectomy, Laparoscopic     COLONOSCOPY  2007?    Bathroom issue..results normal     OPEN REDUCTION INTERNAL FIXATION TOE(S)  4/13/2012    Procedure:OPEN REDUCTION INTERNAL FIXATION TOE(S); Open reduction internal fixation right proximal fifth metatarsal fracture-   Anes-choice block; Surgeon:LEY, JEFFREY DUANE; Location:[facility] OR           CURRENT MEDICATIONS:    Current Facility-Administered Medications   Medication     0.9% sodium chloride BOLUS     Current Outpatient Medications   Medication     acetaminophen (TYLENOL) 325  MG tablet     bumetanide (BUMEX) 2 MG tablet     cefuroxime (CEFTIN) 500 MG tablet     Cholecalciferol (VITAMIN D3 PO)     [START ON 3/5/2022] desogestrel-ethinyl estradiol (APRI) 0.15-30 MG-MCG tablet     gabapentin (NEURONTIN) 100 MG capsule     lisinopril (ZESTRIL) 10 MG tablet     magnesium oxide (MAG-OX) 400 MG tablet     metoprolol succinate ER (TOPROL-XL) 25 MG 24 hr tablet     miconazole (MICATIN) 2 % external powder     rivaroxaban ANTICOAGULANT (XARELTO) 15 MG TABS tablet     [START ON 3/4/2022] rivaroxaban ANTICOAGULANT (XARELTO) 20 MG TABS tablet     sertraline (ZOLOFT) 100 MG tablet         ALLERGIES:  Allergies   Allergen Reactions     Nkda [No Known Drug Allergies]        FAMILY HISTORY:  Family History   Problem Relation Age of Onset     Neurologic Disorder Father         MS     Heart Disease Father         irregular heart rate     Diabetes Father      Melanoma Father      Sleep Apnea Father      Other Cancer Father      Cancer Maternal Grandfather         lung     Other Cancer Maternal Grandfather      Cancer Paternal Grandmother         stomach     Other Cancer Paternal Grandmother      Cancer Mother      Other Cancer Mother      Thyroid Disease Mother      Gynecology Maternal Aunt         PCOS     Hypertension Maternal Grandmother      Anxiety Disorder Maternal Grandmother      Thyroid Disease Maternal Grandmother      Anxiety Disorder Sister        SOCIAL HISTORY:   Social History     Socioeconomic History     Marital status: Single     Spouse name: Not on file     Number of children: Not on file     Years of education: Not on file     Highest education level: Not on file   Occupational History     Employer: [employer]   Tobacco Use     Smoking status: Never Smoker     Smokeless tobacco: Never Used   Vaping Use     Vaping Use: Never used   Substance and Sexual Activity     Alcohol use: Yes     Comment: social     Drug use: No     Sexual activity: Not Currently     Partners: Male     Birth  "control/protection: Pill   Other Topics Concern     Parent/sibling w/ CABG, MI or angioplasty before 65F 55M? No   Social History Narrative    , 3rd-5th grade     Social Determinants of Health     Financial Resource Strain: Not on file   Food Insecurity: Not on file   Transportation Needs: Not on file   Physical Activity: Not on file   Stress: Not on file   Social Connections: Not on file   Intimate Partner Violence: Not At Risk     Fear of Current or Ex-Partner: No     Emotionally Abused: No     Physically Abused: No     Sexually Abused: No   Housing Stability: Not on file       VITALS:  BP (!) 84/46   Pulse 105   Temp 98.6  F (37  C) (Oral)   Resp 21   Ht 1.626 m (5' 4\")   Wt (!) 159.7 kg (352 lb)   LMP 02/19/2022 (Exact Date)   SpO2 96%   Breastfeeding No   BMI 60.42 kg/m      PHYSICAL EXAM    Constitutional: Obese chronically ill-appearing middle-aged female patient, laying in bed, no distress  HENT: Normocephalic, Atraumatic. Neck Supple.  Eyes: EOMI, Conjunctiva normal.  Respiratory: Breathing comfortably on room air. Speaks full sentences easily. Lungs clear to ascultation.  Cardiovascular: Tachycardic heart rate, Regular rhythm.  Chronic changes of severe lymphedema in the bilateral lower extremities with mild woody edema primarily affecting the lower legs.  Abdomen: Soft, nontender.  Musculoskeletal: Good range of motion in all major joints. No major deformities noted.  Integument: Warm, Dry.  Neurologic: Alert & awake, Normal motor function, Normal sensory function, No focal deficits noted.   Psychiatric: Cooperative. Affect appropriate.     LAB:  All pertinent labs reviewed and interpreted.  Labs Ordered and Resulted from Time of ED Arrival to Time of ED Departure   BASIC METABOLIC PANEL - Abnormal       Result Value    Sodium 134 (*)     Potassium 5.6 (*)     Chloride 92 (*)     Carbon Dioxide (CO2) 21 (*)     Anion Gap 21 (*)     Urea Nitrogen 47 (*)     Creatinine 4.46 " (*)     Calcium 11.0 (*)     Glucose 97      GFR Estimate 12 (*)    CBC WITH PLATELETS AND DIFFERENTIAL - Abnormal    WBC Count 14.1 (*)     RBC Count 5.77 (*)     Hemoglobin 15.5      Hematocrit 51.4 (*)     MCV 89      MCH 26.9      MCHC 30.2 (*)     RDW 15.7 (*)     Platelet Count 322      % Neutrophils 75      % Lymphocytes 14      % Monocytes 8      % Eosinophils 1      % Basophils 1      % Immature Granulocytes 1      NRBCs per 100 WBC 0      Absolute Neutrophils 10.8 (*)     Absolute Lymphocytes 2.0      Absolute Monocytes 1.1      Absolute Eosinophils 0.2      Absolute Basophils 0.1      Absolute Immature Granulocytes 0.1      Absolute NRBCs 0.0     HEPATIC FUNCTION PANEL - Abnormal    Bilirubin Total 1.8 (*)     Bilirubin Direct 0.5      Protein Total 8.1 (*)     Albumin 3.7      Alkaline Phosphatase 66      AST 31      ALT 19     B-TYPE NATRIURETIC PEPTIDE ([facility] ONLY) - Normal    BNP 64     MAGNESIUM - Normal    Magnesium 2.4     TROPONIN I - Normal    Troponin I 0.05     LACTIC ACID WHOLE BLOOD   BLOOD CULTURE   BLOOD CULTURE       RADIOLOGY:  Reviewed all pertinent imaging. Please see official radiology report.  XR Chest Port 1 View   Final Result   IMPRESSION: Large body habitus. Shallow inspiration with crowding of pulmonary vessels. No signs of pneumonia or failure. Heart and pulmonary vascularity are normal.          EKG:    Performed at: 2104    Impression: Sinus tachycardia, right superior axis deviation, nonspecific ST-T wave abnormality    Rate: 106  Rhythm: Sinus  Axis: Normal  [place] Interval: 188  QRS Interval: 80  QTc Interval: 459  ST Changes: Nonspecific ST-T wave abnormality  Comparison: Compared to February 9, 2022, questionable change in QRS axis, no other significant changes    I have independently reviewed and interpreted the EKG(s) documented above.    I, Gordon Issa, am serving as a scribe to document services personally performed by Dr. Tony Connelly, based on my observation and  the provider's statements to me. I, Tony Connelly MD attest that Gordonalee Issa is acting in a scribe capacity, has observed my performance of the services and has documented them in accordance with my direction.    Tony Connelly M.D.  Emergency Medicine  [facility] EMERGENCY DEPARTMENT  [address] [place] [id number]  [phone]  Dept: [phone]     Tony Connelly MD  02/20/22 5134

## 2022-02-21 NOTE — H&P
The pre-operative history and physical examination was reviewed and the patient examined. There are no changes to be made to the history and physical examination.    Doyle Palomares M.D.  Pager [phone]  2/21/2022 5:09 PM    [facility], [address], [address]  [place] [address]  Ph: [phone]  Fax: [phone]

## 2022-02-21 NOTE — ED NOTES
Delayed entry   Pt has been in Special Precautions since arrival. ED technicians, cleaned pt's perineum, repositioned pt and applied Pure Wick.  Pt remains alert and orientedx3, breathing is easy and nonlabored.

## 2022-02-21 NOTE — ED NOTES
Blood cultures drawn as ordered, 2nd PIV inserted 20 gauge in left forearm. IV fluid bolus infusing as ordered. Pt's blood pressure continues to be low, notified Dr. Levy. PICC ordered and PICC nurse paged.

## 2022-02-22 ENCOUNTER — APPOINTMENT (OUTPATIENT)
Dept: OCCUPATIONAL THERAPY | Facility: HOSPITAL | Age: 47
DRG: 853 | End: 2022-02-22
Attending: NURSE PRACTITIONER
Payer: COMMERCIAL

## 2022-02-22 LAB
ANION GAP SERPL CALCULATED.3IONS-SCNC: 10 MMOL/L (ref 5–18)
ATRIAL RATE - MUSE: 106 BPM
BACTERIA UR CULT: NO GROWTH
BUN SERPL-MCNC: 33 MG/DL (ref 8–22)
CALCIUM SERPL-MCNC: 9.2 MG/DL (ref 8.5–10.5)
CHLORIDE BLD-SCNC: 104 MMOL/L (ref 98–107)
CO2 SERPL-SCNC: 23 MMOL/L (ref 22–31)
CREAT SERPL-MCNC: 1.29 MG/DL (ref 0.6–1.1)
DIASTOLIC BLOOD PRESSURE - MUSE: 49 MMHG
ERYTHROCYTE [DISTWIDTH] IN BLOOD BY AUTOMATED COUNT: 15.7 % (ref 10–15)
GFR SERPL CREATININE-BSD FRML MDRD: 51 ML/MIN/1.73M2
GLUCOSE BLD-MCNC: 119 MG/DL (ref 70–125)
HCT VFR BLD AUTO: 46.3 % (ref 35–47)
HGB BLD-MCNC: 14.2 G/DL (ref 11.7–15.7)
INTERPRETATION ECG - MUSE: NORMAL
MCH RBC QN AUTO: 27.3 PG (ref 26.5–33)
MCHC RBC AUTO-ENTMCNC: 30.7 G/DL (ref 31.5–36.5)
MCV RBC AUTO: 89 FL (ref 78–100)
P AXIS - MUSE: 60 DEGREES
PLATELET # BLD AUTO: 264 10E3/UL (ref 150–450)
POTASSIUM BLD-SCNC: 4.7 MMOL/L (ref 3.5–5)
PR INTERVAL - MUSE: 188 MS
QRS DURATION - MUSE: 80 MS
QT - MUSE: 346 MS
QTC - MUSE: 459 MS
R AXIS - MUSE: 184 DEGREES
RBC # BLD AUTO: 5.2 10E6/UL (ref 3.8–5.2)
SODIUM SERPL-SCNC: 137 MMOL/L (ref 136–145)
SYSTOLIC BLOOD PRESSURE - MUSE: 94 MMHG
T AXIS - MUSE: 7 DEGREES
VANCOMYCIN SERPL-MCNC: 13.2 MG/L
VENTRICULAR RATE- MUSE: 106 BPM
WBC # BLD AUTO: 9.2 10E3/UL (ref 4–11)

## 2022-02-22 PROCEDURE — 99233 SBSQ HOSP IP/OBS HIGH 50: CPT | Mod: GC | Performed by: INTERNAL MEDICINE

## 2022-02-22 PROCEDURE — G0463 HOSPITAL OUTPT CLINIC VISIT: HCPCS

## 2022-02-22 PROCEDURE — 250N000013 HC RX MED GY IP 250 OP 250 PS 637: Performed by: NURSE PRACTITIONER

## 2022-02-22 PROCEDURE — 250N000009 HC RX 250: Performed by: EMERGENCY MEDICINE

## 2022-02-22 PROCEDURE — 258N000003 HC RX IP 258 OP 636: Performed by: NURSE PRACTITIONER

## 2022-02-22 PROCEDURE — 85027 COMPLETE CBC AUTOMATED: CPT | Performed by: INTERNAL MEDICINE

## 2022-02-22 PROCEDURE — 80202 ASSAY OF VANCOMYCIN: CPT | Performed by: NURSE PRACTITIONER

## 2022-02-22 PROCEDURE — 250N000011 HC RX IP 250 OP 636: Performed by: STUDENT IN AN ORGANIZED HEALTH CARE EDUCATION/TRAINING PROGRAM

## 2022-02-22 PROCEDURE — 97535 SELF CARE MNGMENT TRAINING: CPT | Mod: GO

## 2022-02-22 PROCEDURE — 80048 BASIC METABOLIC PNL TOTAL CA: CPT | Performed by: INTERNAL MEDICINE

## 2022-02-22 PROCEDURE — 94660 CPAP INITIATION&MGMT: CPT

## 2022-02-22 PROCEDURE — 99291 CRITICAL CARE FIRST HOUR: CPT | Performed by: NURSE PRACTITIONER

## 2022-02-22 PROCEDURE — 999N000157 HC STATISTIC RCP TIME EA 10 MIN

## 2022-02-22 PROCEDURE — 200N000001 HC R&B ICU

## 2022-02-22 PROCEDURE — 250N000013 HC RX MED GY IP 250 OP 250 PS 637: Performed by: STUDENT IN AN ORGANIZED HEALTH CARE EDUCATION/TRAINING PROGRAM

## 2022-02-22 PROCEDURE — 99233 SBSQ HOSP IP/OBS HIGH 50: CPT | Performed by: INTERNAL MEDICINE

## 2022-02-22 PROCEDURE — 97166 OT EVAL MOD COMPLEX 45 MIN: CPT | Mod: GO

## 2022-02-22 PROCEDURE — 250N000013 HC RX MED GY IP 250 OP 250 PS 637: Performed by: SPECIALIST

## 2022-02-22 RX ORDER — DIPHENHYDRAMINE HCL 25 MG
50 TABLET ORAL
Status: DISCONTINUED | OUTPATIENT
Start: 2022-02-22 | End: 2022-02-27 | Stop reason: HOSPADM

## 2022-02-22 RX ORDER — GABAPENTIN 100 MG/1
200 CAPSULE ORAL 3 TIMES DAILY
Status: DISCONTINUED | OUTPATIENT
Start: 2022-02-22 | End: 2022-02-27 | Stop reason: HOSPADM

## 2022-02-22 RX ORDER — VANCOMYCIN HYDROCHLORIDE 1 G/200ML
1000 INJECTION, SOLUTION INTRAVENOUS EVERY 12 HOURS
Status: DISCONTINUED | OUTPATIENT
Start: 2022-02-22 | End: 2022-02-22

## 2022-02-22 RX ORDER — MINERAL OIL/HYDROPHIL PETROLAT
OINTMENT (GRAM) TOPICAL DAILY
Status: DISCONTINUED | OUTPATIENT
Start: 2022-02-22 | End: 2022-02-27 | Stop reason: HOSPADM

## 2022-02-22 RX ORDER — DIPHENHYDRAMINE HYDROCHLORIDE 50 MG/ML
50 INJECTION INTRAMUSCULAR; INTRAVENOUS
Status: DISCONTINUED | OUTPATIENT
Start: 2022-02-22 | End: 2022-02-27 | Stop reason: HOSPADM

## 2022-02-22 RX ADMIN — ACETAMINOPHEN 975 MG: 325 TABLET ORAL at 04:04

## 2022-02-22 RX ADMIN — SERTRALINE HYDROCHLORIDE 100 MG: 100 TABLET ORAL at 08:57

## 2022-02-22 RX ADMIN — Medication 0.06 MCG/KG/MIN: at 05:00

## 2022-02-22 RX ADMIN — ACETAMINOPHEN 975 MG: 325 TABLET ORAL at 12:47

## 2022-02-22 RX ADMIN — SODIUM CHLORIDE 500 ML: 9 INJECTION, SOLUTION INTRAVENOUS at 17:18

## 2022-02-22 RX ADMIN — ACETAMINOPHEN 975 MG: 325 TABLET ORAL at 20:26

## 2022-02-22 RX ADMIN — POLYETHYLENE GLYCOL 3350 17 G: 17 POWDER, FOR SOLUTION ORAL at 08:58

## 2022-02-22 RX ADMIN — MICONAZOLE NITRATE 1 APPLICATOR: 20 POWDER TOPICAL at 20:28

## 2022-02-22 RX ADMIN — MICONAZOLE NITRATE: 20 POWDER TOPICAL at 12:46

## 2022-02-22 RX ADMIN — PIPERACILLIN SODIUM AND TAZOBACTAM SODIUM 3.38 G: 3; .375 INJECTION, POWDER, LYOPHILIZED, FOR SOLUTION INTRAVENOUS at 09:04

## 2022-02-22 RX ADMIN — GABAPENTIN 200 MG: 100 CAPSULE ORAL at 14:30

## 2022-02-22 RX ADMIN — WHITE PETROLATUM: 1.75 OINTMENT TOPICAL at 12:48

## 2022-02-22 RX ADMIN — GABAPENTIN 200 MG: 100 CAPSULE ORAL at 20:26

## 2022-02-22 RX ADMIN — GABAPENTIN 100 MG: 100 CAPSULE ORAL at 08:57

## 2022-02-22 RX ADMIN — PIPERACILLIN SODIUM AND TAZOBACTAM SODIUM 3.38 G: 3; .375 INJECTION, POWDER, LYOPHILIZED, FOR SOLUTION INTRAVENOUS at 01:46

## 2022-02-22 RX ADMIN — PIPERACILLIN SODIUM AND TAZOBACTAM SODIUM 3.38 G: 3; .375 INJECTION, POWDER, LYOPHILIZED, FOR SOLUTION INTRAVENOUS at 17:18

## 2022-02-22 RX ADMIN — DOCUSATE SODIUM 50 MG AND SENNOSIDES 8.6 MG 1 TABLET: 8.6; 5 TABLET, FILM COATED ORAL at 08:57

## 2022-02-22 RX ADMIN — RIVAROXABAN 15 MG: 15 TABLET, FILM COATED ORAL at 20:27

## 2022-02-22 ASSESSMENT — ACTIVITIES OF DAILY LIVING (ADL)
ADLS_ACUITY_SCORE: 15

## 2022-02-22 NOTE — PROGRESS NOTES
RENAL PROGRESS NOTE      ASSESSMENT & PLAN:   This is a 46 year old female with past medical history significant for morbid obesity,  congestive heart failure, chronic lymphedema of lower extremities, hypertension, PE on chronic articulation of Xarelto, obstructive apnea on CPAP at night and multiple sclerosis who was recently 02/08-02/18/2022 admitted for COVID-19 infection, acute hypoxic respiratory failure and congestive heart failure exacerbation. Patient presented for further evaluation of inability to urinate.  Patient was found to have sepsis and acute kidney injury.  Nephrology service consulted for acute kidney injury.    Acute renal failure-likely multifactorial secondary to prerenal etiology from septic shock in settings of diuretics and ACE inhibitor's use and obstructive uropathy.  Patient baseline serum creatinine 0.8 mg/dL.  Patient presents with creatinine of 4.46 mg/dL.  Serum creatinine is trending down to 1.29 mg/dL this am.  Urine output in nonoliguric range w/o diuretics.  S/p cystoscopy with a right ureteral stent placement 02/21  Patient is history of intermittent microscopic hematuria he is going back to June 2011. Likely secondary to nephrolithiasis vs menorrhagia.  Urinalysis on February 80,022 showed 22 red blood cells per hpf, no proteinuria.  Urinalysis in March 2021 showed 120 g/g of protein and 22 red blood cells per hpf.  UA 02/21 showed trace proteinuria and no hematuria. UPCR is 0.3 g/g.  No indication for initiation of dialysis.    Continue to hold prior to admission Bumex and lisinopril  Monitor renal function and urine output daily  Avoid nephrotoxins and renally dose medications.     Obstructive uropathy-CT abdomen pelvis showed 8 x 7 by extremity of proximal right ureteral stone with mild hydronephrosis.  S/p cystoscopy with a right ureteral stent placement 02/21. Urology is following, input appreciated     Hyperkalemia-mild.  Likely secondary acute kidney injury and  metabolic acidosis.  Status post Kayexalate. Resolved.     Metabolic acidosis-likely secondary to HUNG.  Resolved. Serum bicarbonate is trended up from  17 to 23 this morning.     Septic shock-etiology unclear.  Chest x-ray showed no evidence of pulmonary infiltrates. Patient was recently treated for lower extremity cellulitis and discharged on cefuroxime.  Blood and urine cultures are negative to date.  Weaned off Levophed drip this am. On broad-spectrum IV antibiotics.    Consider to obtain urine cultures.  Continue to hold prior to admission lisinopril and Bumex  Management as per ICU team     Volume status-hard to assess given patient body habitus.  Patient with history of diastolic just of heart failure.  Patient reports improved lower extremity edema last 2 days.  Prior admission was taken 2 mg of Bumex twice a day. Wt is trending down. Net 1.5L negative since admission.  Continue to hold prior to admission diuretics given acute kidney injury.  Monitor ins and outs  -Daily weight  -Low-sodium diet     History of hypertension-prior to admission patient was taken  Lisinopril 10 mg daily, metoprolol succinate 25 mg daily and Bumex 2 mg BID. PTA antihypertensive medications on hold.     Hypercalcemia-mild.  Patient had a serum calcium of 11 on admission.  Resolved with hydration.      History of hypomagnesemia-prior to mission was taken magnesium oxide 400 g twice daily.  Mg level was wnl at 2.4 on 02/0.  Continue to hold prior to admission Mg supplements given HUNG.       History of pulmonary embolism-on chronic anticoagulation with Xarelto     History of sleep apnea on CPAP at night     Jodee Clark MD  [facility]  [address] [place] [address], [address]  [place] [address]  Phone# [phone]  Fax# [phone]     SUBJECTIVE:   Patient underwent cystoscopy with right retrograde pyelogram and right ureteral stent placement. No acute issues overnight. Weaned off Levophed drip this  am.  Patient states that she is feeling better, reports improved lower extremity edema.  Patient denies: fever, chills,  dizziness,  cough, shortness of breath , chest pain, palpitations, orthopnea, nausea, vomiting, abdominal pain.    OBJECTIVE:  Physical Exam   Temp: 98.3  F (36.8  C) Temp src: Oral BP: 118/62 Pulse: 97   Resp: 18 SpO2: 97 % O2 Device: Nasal cannula Oxygen Delivery: 2 LPM  Vitals:    02/20/22 2056 02/22/22 0600   Weight: (!) 159.7 kg (352 lb) (!) 157.7 kg (347 lb 11.2 oz)     Vital Signs with Ranges  Temp:  [97  F (36.1  C)-99.2  F (37.3  C)] 98.3  F (36.8  C)  Pulse:  [] 97  Resp:  [15-55] 18  BP: ()/(46-97) 118/62  SpO2:  [87 %-100 %] 97 %  I/O last 3 completed shifts:  In: 1647.33 [P.O.:220; I.V.:1427.33]  Out: 3185 [Urine:3185]    @TMAXR(24)@    Patient Vitals for the past 72 hrs:   Weight   02/22/22 0600 (!) 157.7 kg (347 lb 11.2 oz)   02/20/22 2056 (!) 159.7 kg (352 lb)   [unfilled]    PHYSICAL EXAM:  Alert/oriented x 3; awake and NAD, super morbidly obese  HEENT NC/AT; perrla; OP clear without lesions; mmm  Neck supple without LAD, TM  CV; RRR without rub or murmur  Lung: clear and equal; no extra sounds  Ab: Large abdominal pannus, soft and NT; not distended; normal bs  : Brar catheter in place, making watermelon color urine.  Ext: ++ LE edema and well perfused  Skin; erythema of anterior shins of is a cobblestone appearance consistent with stasis dermatitis  Neuro; grossly intact    LABORATORY STUDIES:     Recent Labs   Lab 02/22/22  0544 02/20/22 2122   WBC 9.2 14.1*   RBC 5.20 5.77*   HGB 14.2 15.5   HCT 46.3 51.4*    322       Basic Metabolic Panel:  Recent Labs   Lab 02/22/22  0544 02/21/22  0626 02/20/22 2122 02/18/22  0555 02/17/22  0538 02/16/22  0614    136 134*  --  138  --    POTASSIUM 4.7 3.4* 5.6* 4.2 3.9  4.0 4.0   CHLORIDE 104 106 92*  --  98  --    CO2 23 17* 21*  --  27  --    BUN 33* 41* 47*  --  17  --    CR 1.29* 2.76* 4.46*  --  0.85  --      108 97  --  102  --    EVITA 9.2 7.3* 11.0*  --  9.9  --        INRNo lab results found in last 7 days.     Recent Labs   Lab Test 02/22/22  0544 02/20/22  2122 02/14/22  0510 02/11/22  0502   INR  --   --   --  1.23*   WBC 9.2 14.1*   < > 5.9   HGB 14.2 15.5   < > 12.6    322   < > 219    < > = values in this interval not displayed.       Personally reviewed current labs     ~ 35 minutes spent in exam, POC, education regarding renal disease and management.  >90% time spent in education and counseling and/or discussion with patient's care team    Jodee Clark MD  [facility]  [address] [place] [address], [address]  [place] [address]  Phone# [phone]  Fax# [phone]

## 2022-02-22 NOTE — OP NOTE
Operative note:    Procedure:    1.  Cystoscopy with right retrograde pyelogram and right ureteral stent placement.    Surgeon:    Doyle Palomares MD    Anesthesia:    MAC    Preoperative diagnosis:    1.  Obstructing right ureteral calculus with right pyelonephritis.    Postoperative diagnosis:    Same    The patient was brought to the operating suite where satisfactory IV sedation was administered.  She is morbidly obese with severe lower extremity lymphedema.  After sterile prepping and draping and after a timeout was called, I then inserted the Olympus cystoscope per urethra into the bladder.  The bladder appeared mildly inflamed.  Both ureteral orifices were identified.  No mucosal lesions were seen.    Next I inserted a #5 open-ended ureteral catheter part way up the right ureter.  Contrast was injected in a retrograde fashion identifying a fairly radiolucent stone about 7 to 8 mm in length in the proximal ureter just below the ureteropelvic junction.    I was able to successfully thread a 0.035 Bentson guidewire through the ureteral catheter beyond the stone into the right renal pelvis under fluoroscopic guidance.  The ureteral catheter was then withdrawn.    A 6 x 24 double-J stent was then advanced up the guidewire via the Seldinger technique into the right renal pelvis under fluoroscopic guidance.  The stent was confirmed to be in good position.  The dangle string was cut from the stent and the guidewire withdrawn.  The cystoscope was withdrawn after the bladder was drained.    Following this a 16 Swedish Brar catheter was then inserted per urethra into the bladder.    EBL 0 cc.  There were no complications.  The patient tolerated procedure well.  We plan to leave the stent in for at least 2 weeks and then perform ureteroscopic stone extraction with laser lithotripsy.      Doyle Palomares M.D.  February 21, 2022  Pager [phone]  Cell: [phone]

## 2022-02-22 NOTE — PROGRESS NOTES
Place of Service:  [facility]     Reason for follow up: suspected infected stone, POD 1 s/p Cystoscopy with right retrograde pyelogram and right ureteral stent placement    SUBJECTIVE:  Events: no acute events overnight    Patient reports tolerating stent. No fevers, nausea or vomiting. .    OBJECTIVE:  PHYSICAL EXAM:  Temp: 98.3  F (36.8  C) Temp src: Oral BP: 118/62 Pulse: 97   Resp: 18 SpO2: 97 % O2 Device: Nasal cannula Oxygen Delivery: 2 LPM  General: NAD, alert, cooperative, alert and smiling.   Head: normocephalic, without abnormality / atraumatic  Abdomen: soft, non tender, non distended. no suprapubic fullness, no suprapubic tenderness. no CVA tenderness,   Genitourinary: deferred  Skin: No rashes or lesions  Musculoskeletal: moves all four extremities equally; no calf edema or tenderness  Psychological: alert and oriented, answers questions appropriately    LABS:  Creatinine   Date Value Ref Range Status   02/22/2022 1.29 (H) 0.60 - 1.10 mg/dL Final   03/07/2021 0.62 0.52 - 1.04 mg/dL Final     WBC   Date Value Ref Range Status   03/07/2021 7.3 4.0 - 11.0 10e9/L Final     WBC Count   Date Value Ref Range Status   02/22/2022 9.2 4.0 - 11.0 10e3/uL Final     Hemoglobin   Date Value Ref Range Status   02/22/2022 14.2 11.7 - 15.7 g/dL Final   03/07/2021 12.5 11.7 - 15.7 g/dL Final   ]  Platelet Count   Date Value Ref Range Status   02/22/2022 264 150 - 450 10e3/uL Final   03/08/2021 223 150 - 450 10e9/L Final       UA:  UA RESULTS:  Recent Labs   Lab Test 02/21/22  1237 02/24/20  1314 02/13/20  1647   COLOR Colorless   < > Yellow   APPEARANCE Clear   < > Cloudy   URINEGLC Negative   < > Negative   URINEBILI Negative   < > Negative   URINEKETONE Negative   < > Negative   SG 1.010   < > 1.020   UBLD 0.2 mg/dL*   < > Negative   URINEPH 5.5   < > 7.0   PROTEIN 10 *   < > Trace*   UROBILINOGEN  --   --  2.0*   NITRITE Negative   < > Negative   LEUKEST Negative   < > Trace*   RBCU <1   < > 2-5*   WBCU 7*    < > 10-25*    < > = values in this interval not displayed.         Cultures:  Urine no growth to date    Blood no growth to date    Lab Results: personally reviewed.     ASSESSMENT/PLAN:  Bess Enamorado is being seen by [facility] for suspected infected stone, POD 1 s/p Cystoscopy with right retrograde pyelogram and right ureteral stent placement      - Creatinine trending down, 1.29 today, 2.76 yesterday  - Abnormal UA, urine culture no growth to date, blood cultures no growth to date currently on Zosyn. Do suspect infected stone.  Appreciate primary team to continue to manage antibiotics will need extended course for suspected complicated UTI.  -Right ureteral stent placed 2/21, will be in place for at least 2 weeks, followed by definitive management with ureteroscopic stone extraction with laser lithotripsy. Office staff will contact patient to schedule procedure  -Urology will continue to follow        Flora Wei PA-C  [facility]   [phone]

## 2022-02-22 NOTE — PROGRESS NOTES
Care Management Follow Up    Length of Stay (days): 1    Expected Discharge Date:  2/24/2022 or 2/25/2022       Concerns to be Addressed:   Urology following due to suspected infected stone (post Cystoscopy with right retrograde pyelogram and right ureteral stent placement on 2/21/2022): cultures pending, IV Zosyn. Levo drip.   Patient plan of care discussed at interdisciplinary rounds: Yes    Anticipated Discharge Disposition:  Patient's goal is a home discharge with resumed home care.      Anticipated Discharge Services:  Home nursing (resumed) plus PT, OT and a home health aide.  Anticipated Discharge DME:  Per therapy (if indicated).    Patient/family educated on Medicare website which has current facility and service quality ratings:  NA  Education Provided on the Discharge Plan:  Per team.  Patient/Family in Agreement with the Plan:  Yes    Referrals Placed by CM/SW:  [facility];   Private pay costs discussed: Not applicable at this time.     Additional Information:  Met with patient to confirm her goals for discharge. She plans to return home at discharge and anticipates family transport.  Patient lives alone in her house and has RN/PT/OT/HHA with [facility] (confirmed with intake). She has home oxygen from [facility] (which had been using only at night). Patient uses a walker to ambulate and does not drive. She also has PCA support (currently her sister is her PCA). She has a commode chair, rolling walker and lift recliner at home. She is looking in to getting a hospital bed.     Anne Lopez RN

## 2022-02-22 NOTE — PROGRESS NOTES
ICU PROGRESS NOTE:        Assessment/Plan:     Changes for Today:    Continue to wean Levophed Gtt as tolerated    Lymphedema Impatient Consult     Discuss with Urology when it would be appropriate to restart Xarelto    Neuro:    No acute issues    Avoid narcotics if possible given hypotension    Pain control for obstructing right ureteral stone    Tylenol prn and scheduled      Pulmonary:    History of NARCISA, patient states she wears CPAP for sleep, but is not on supplemental oxygen otherwise.    Goal SpO2 of 92% or greater    Continue CPAP for sleep    CXR benign on admission         Cardiac:    Hypotension secondary to circulatory shock    On recent admission patent was aggressively diuresed to 16 liters. Now with obstructing stone likely causing more renal dysfunction and hypotension    Continue on Levophed for goal MAP > 65    ECHO on 2/9 showed preserved EF. Mildly decreased right ventricular systolic function.      Lactic acid level WNL, no need to trend    History of hypertension. Lisinopril and Metoprolol on hold      GI:    No acute issues    Now advancing diet, and patient is tolerating      Renal:    HUNG secondary to obstructing right ureteral stone with mild hydronephrosis     Normal Left Ureter and Kidney    Creatinine normal at baseline.  4.46 on admission, down to 2.76 later on 2/21 with IVF and pressors, improved perfusion. Now after stent, improved even further this am to 1.29    Patent making urine, negative 1.5 liters since admission     Continue to trend  renal function    Urology and Nephrology consulted: appreciate their assistance     Hypocalcemia, resolved.  Given 1 Gm calcium gluconate IV on 2/21    Hyperkalemia, resolved.     Right ureteral stent placed 2/21.  This, per urology will be in place for at least 2 weeks, followed by ureteroscopic stone extraction with laser lithotripsy.      ID:    Blood cultures times 2 in process, negative thus far.     Urine showing few bacteria and  blood    Started on Zosyn and Vanco given recent admission.  Will continue Zosyn for now. Discontinue Vancomycin.     Continue improvement in clinical status.      Procal 0.15, lactate normal.  WBC ct down to 9.2 this morning.      Heme/Coag:    Patent is anticoagulated on Xarelto.  This is for history of PE.     Will speak with urology on when it is ok to resume above.      Endocrine:    No acute issues        ICU Prophylaxis:     PPI:  Not required     Peridex:  Not required      Anticoagulation/DVT Prophylaxis:  Xarelto on hold due to possible procedure today.            Lines/Drains/Tubes:  Peripheral IV 02/20/22 Right Hand     Peripheral IV 02/20/22 Left Lower forearm     PICC Triple Lumen 02/21/22 Right Brachial vein medial Vasporessors     Brar Catheter, placed in OR, 2/21  Ureteral stent, placed 2/21             CODE STATUS:  FULL      SUBJECTIVE:    Ccx: Stomach a bit upset, but thinks she needs to have a bowel movement.     HPI:    Bess Enamorado is a 46 year old female with medical history sign ificant for HFpEF, history of PE/on Xarelto, chronic lower extremity lymphedema, NARCISA, history of cellulitis, and morbid obesity.  Patient was recently hospitalized from 2/8/2022 to 2/18/2022 for COVID-19, acute respiratory failure, and CHF exacerbation.  She was recovering at [facility], and was found to have decreased urine output.  She was brought to [facility] Emergency Room and was evaluated for HUNG and hypotension requiring pressors.    Patient started having decreased UOP on 2/19, and no urine 2/20.  She was taking her Bumex as prescribed.  No other complaints.  She is currently in no pain and is having no nausea or vomiting.  Denies shortness of breath or chest pain.   OR on 2/21 for cystoscopy/pyelogram with right ureteral stent placement.         Past Medical History:   Diagnosis Date     Acute on chronic diastolic congestive heart failure (H) 2/9/2022     Arthritis 2019    Hips     ASCUS favor  benign 8/2015    Neg HPV     Depressive disorder 2010     H/O colposcopy with cervical biopsy 9/14/15    Bx & ECC - negative     Hypertension 2019     LSIL on Pap smear 7/2014    Neg high risk HPV     Multiple sclerosis (H) 8/29/2005 9/18/200pt dx with MS 2004--dx by neurolgist and is followed by Dr. Steven Ayala 10/2016     UNSPEC CONSTIPATION 9/18/2006 9/18/2006   Has tried otc suppository and otc lax.      Past Surgical History:   Procedure Laterality Date     CHOLECYSTECTOMY, LAPOROSCOPIC      Cholecystectomy, Laparoscopic     COLONOSCOPY  2007?    Bathroom issue..results normal     OPEN REDUCTION INTERNAL FIXATION TOE(S)  4/13/2012    Procedure:OPEN REDUCTION INTERNAL FIXATION TOE(S); Open reduction internal fixation right proximal fifth metatarsal fracture-   Anes-choice block; Surgeon:LEY, JEFFREY DUANE; Location:[place] OR     PICC TRIPLE LUMEN PLACEMENT  2/21/2022          Current Facility-Administered Medications   Medication     acetaminophen (TYLENOL) Suppository 650 mg     [START ON 2/24/2022] acetaminophen (TYLENOL) tablet 650 mg     acetaminophen (TYLENOL) tablet 975 mg     bisacodyl (DULCOLAX) Suppository 10 mg     gabapentin (NEURONTIN) capsule 100 mg     HYDROmorphone (DILAUDID) injection 0.2 mg     HYDROmorphone (DILAUDID) injection 0.2 mg    Or     HYDROmorphone (DILAUDID) injection 0.4 mg     iohexol (OMNIPAQUE) 300 mg/mL injection     lidocaine (LMX4) cream     lidocaine (LMX4) cream     lidocaine 1 % 0.1-1 mL     lidocaine 1 % 0.1-1 mL     lidocaine 1 % 0.1-5 mL     magnesium hydroxide (MILK OF MAGNESIA) suspension 30 mL     melatonin tablet 1 mg     [Held by provider] metoprolol succinate ER (TOPROL-XL) 24 hr tablet 25 mg     miconazole (MICATIN) 2 % powder     naloxone (NARCAN) injection 0.2 mg    Or     naloxone (NARCAN) injection 0.4 mg    Or     naloxone (NARCAN) injection 0.2 mg    Or     naloxone (NARCAN) injection 0.4 mg     norepinephrine (LEVOPHED) 4 mg in  mL infusion  PREMIX     ondansetron (ZOFRAN-ODT) ODT tab 4 mg    Or     ondansetron (ZOFRAN) injection 4 mg     Patient is already receiving anticoagulation with heparin, enoxaparin (LOVENOX), warfarin (COUMADIN)  or other anticoagulant medication     piperacillin-tazobactam (ZOSYN) 3.375 g vial to attach to  mL bag     polyethylene glycol (MIRALAX) Packet 17 g     prochlorperazine (COMPAZINE) injection 10 mg    Or     prochlorperazine (COMPAZINE) tablet 10 mg     [Held by provider] rivaroxaban ANTICOAGULANT (XARELTO) tablet 15 mg     senna-docusate (SENOKOT-S/PERICOLACE) 8.6-50 MG per tablet 1 tablet     sertraline (ZOLOFT) tablet 100 mg     sodium chloride (PF) 0.9% PF flush 10-20 mL     sodium chloride (PF) 0.9% PF flush 10-40 mL     sodium chloride (PF) 0.9% PF flush 10-40 mL     sodium chloride (PF) 0.9% PF flush 10-40 mL     sodium chloride (PF) 0.9% PF flush 3 mL     sodium chloride (PF) 0.9% PF flush 3 mL     sodium chloride (PF) 0.9% PF flush 3 mL     sodium chloride (PF) 0.9% PF flush 3 mL     sodium chloride 0.9% (bag) irrigation     sodium polystyrene (KAYEXALATE) suspension 15 g     traMADol (ULTRAM) tablet 50 mg        Allergies   Allergen Reactions     Nkda [No Known Drug Allergies]      Family History   Problem Relation Age of Onset     Neurologic Disorder Father         MS     Heart Disease Father         irregular heart rate     Diabetes Father      Melanoma Father      Sleep Apnea Father      Other Cancer Father      Cancer Maternal Grandfather         lung     Other Cancer Maternal Grandfather      Cancer Paternal Grandmother         stomach     Other Cancer Paternal Grandmother      Cancer Mother      Other Cancer Mother      Thyroid Disease Mother      Gynecology Maternal Aunt         PCOS     Hypertension Maternal Grandmother      Anxiety Disorder Maternal Grandmother      Thyroid Disease Maternal Grandmother      Anxiety Disorder Sister          PHYSICAL ASSESSMENT:  General: appears stated age,  alert, cooperative  Lungs:  CTA  Heart: RRR, S1 and S2   Abdomen: Soft, non-tender.  Bowel sounds present X 4 quadrants.  Skin: skin color normal, texture normal, no rashes or lesions.   Extremities:  Lymphedema BLE. Reddened, scaling.   Pulses:  +2 BUE and +2 BLE  Neuro: Grossly intact, denies pain.     Temp: 98.3  F (36.8  C) Temp src: Oral BP: 101/53 Pulse: 90   Resp: 18 SpO2: 97 % O2 Device: Nasal cannula Oxygen Delivery: 2 LPM        Intake/Output Summary (Last 24 hours) at 2/22/2022 0857  Last data filed at 2/22/2022 0800  Gross per 24 hour   Intake 1732.23 ml   Output 2440 ml   Net -707.77 ml     Temp: 98.3  F (36.8  C) Temp src: Oral BP: 101/53 Pulse: 90   Resp: 18 SpO2: 97 % O2 Device: Nasal cannula Oxygen Delivery: 2 LPM      Lab Results   Component Value Date    WBC 9.2 02/22/2022    WBC 7.3 03/07/2021     Lab Results   Component Value Date    RBC 5.20 02/22/2022    RBC 4.91 03/07/2021     Lab Results   Component Value Date    HGB 14.2 02/22/2022    HGB 12.5 03/07/2021     Lab Results   Component Value Date    HCT 46.3 02/22/2022    HCT 42.8 03/07/2021     No components found for: MCT  Lab Results   Component Value Date    MCV 89 02/22/2022    MCV 87 03/07/2021     Lab Results   Component Value Date    MCH 27.3 02/22/2022    MCH 25.5 03/07/2021     Lab Results   Component Value Date    MCHC 30.7 02/22/2022    MCHC 29.2 03/07/2021     Lab Results   Component Value Date    RDW 15.7 02/22/2022    RDW 15.6 03/07/2021     Lab Results   Component Value Date     02/22/2022     03/08/2021       Last Comprehensive Metabolic Panel:  Sodium   Date Value Ref Range Status   02/22/2022 137 136 - 145 mmol/L Final   03/07/2021 139 133 - 144 mmol/L Final     Potassium   Date Value Ref Range Status   02/22/2022 4.7 3.5 - 5.0 mmol/L Final   03/07/2021 4.3 3.4 - 5.3 mmol/L Final     Chloride   Date Value Ref Range Status   02/22/2022 104 98 - 107 mmol/L Final   03/07/2021 105 94 - 109 mmol/L Final     Carbon  Dioxide   Date Value Ref Range Status   03/07/2021 30 20 - 32 mmol/L Final     Carbon Dioxide (CO2)   Date Value Ref Range Status   02/22/2022 23 22 - 31 mmol/L Final     Anion Gap   Date Value Ref Range Status   02/22/2022 10 5 - 18 mmol/L Final   03/07/2021 4 3 - 14 mmol/L Final     Glucose   Date Value Ref Range Status   02/22/2022 119 70 - 125 mg/dL Final   03/07/2021 99 70 - 99 mg/dL Final     Urea Nitrogen   Date Value Ref Range Status   02/22/2022 33 (H) 8 - 22 mg/dL Final   03/07/2021 12 7 - 30 mg/dL Final     Creatinine   Date Value Ref Range Status   02/22/2022 1.29 (H) 0.60 - 1.10 mg/dL Final   03/07/2021 0.62 0.52 - 1.04 mg/dL Final     GFR Estimate   Date Value Ref Range Status   02/22/2022 51 (L) >60 mL/min/1.73m2 Final     Comment:     Effective December 21, 2021 eGFRcr in adults is calculated using the 2021 CKD-EPI creatinine equation which includes age and gender (Maria Teresa et al., NEJM, DOI: 10.1056/NUCKlv7569602)   03/07/2021 >90 >60 mL/min/[1.73_m2] Final     Comment:     Non  GFR Calc  Starting 12/18/2018, serum creatinine based estimated GFR (eGFR) will be   calculated using the Chronic Kidney Disease Epidemiology Collaboration   (CKD-EPI) equation.       Calcium   Date Value Ref Range Status   02/22/2022 9.2 8.5 - 10.5 mg/dL Final   03/07/2021 9.4 8.5 - 10.1 mg/dL Final       Last Arterial Blood Gas:  pH Arterial   Date Value Ref Range Status   02/08/2022 7.46 (H) 7.37 - 7.44 Final     pCO2 Arterial   Date Value Ref Range Status   02/08/2022 34 (L) 35 - 45 mm Hg Final     pO2 Arterial   Date Value Ref Range Status   02/08/2022 63 (L) 80 - 90 mm Hg Final     Bicarbonate Arterial   Date Value Ref Range Status   02/08/2022 25 23 - 29 mmol/L Final     O2 Sat, Arterial   Date Value Ref Range Status   02/08/2022 94.2 (L) 96.0 - 97.0 % Final     Base Excess/Deficit (+/-)   Date Value Ref Range Status   02/08/2022 0.0   mmol/L Final       Troponin I ES   Date Value Ref Range Status    03/04/2021 <0.015 0.000 - 0.045 ug/L Final     Comment:     The 99th percentile for upper reference range is 0.045 ug/L.  Troponin values   in the range of 0.045 - 0.120 ug/L may be associated with risks of adverse   clinical events.       EXAM: CT ABDOMEN PELVIS W/O CONTRAST  LOCATION: [facility]  DATE/TIME: 2/21/2022 5:23 AM     INDICATION: Diminished urine output. Acute renal failure and hypotension.  COMPARISON: CTA chest 02/08/2022. CT abdomen and pelvis 10/22/2018.  TECHNIQUE: CT scan of the abdomen and pelvis was performed without IV contrast. Multiplanar reformats were obtained. Dose reduction techniques were used.  CONTRAST: None.     FINDINGS:   LOWER CHEST: Normal.     HEPATOBILIARY: Hepatic steatosis. Postcholecystectomy. No biliary dilatation.     PANCREAS: Normal.     SPLEEN: Normal.     ADRENAL GLANDS: Normal.     KIDNEYS/BLADDER: 8 x 7 x 3 mm stone in the proximal right ureter causes mild hydronephrosis. Normal left kidney and bladder.     BOWEL: Colonic diverticulosis. No bowel obstruction or inflammation. Normal appendix.     LYMPH NODES: Normal.     VASCULATURE: Unremarkable.     PELVIC ORGANS: No change in the 9.4 cm left adnexal mass which has fat, soft tissue, and calcified components.     MUSCULOSKELETAL: Normal.                                                                      IMPRESSION:   1.  8 x 7 x 3 mm stone in the proximal right ureter causes mild hydronephrosis.  2.  Colonic diverticulosis.  3.  Hepatic steatosis.  4.  No significant change in the 9.4 cm left ovarian dermoid.        EXAM: XR CHEST PORT 1 VIEW  LOCATION: [facility]  DATE/TIME: 2/20/2022 9:43 PM     INDICATION: Mildly increased dyspnea, suspect hypervolemia  COMPARISON: Chest CTA 02/08/2022, 03/05/2021, chest x-ray 03/04/2021                                                                      IMPRESSION: Large body habitus. Shallow inspiration with crowding of  pulmonary vessels. No signs of pneumonia or failure. Heart and pulmonary vascularity are normal.            JULIETA Hernandez, CNP  Pulmonary Critical Care  Pager: [phone]    Time Billed:  45 minutes of critical care time.    Patient requiring ICU level of care: Patient requiring vasopressors to maintain adequate blood pressures.  High risk for further hemodynamic compromise.

## 2022-02-22 NOTE — PHARMACY-VANCOMYCIN DOSING SERVICE
"Pharmacy Vancomycin Note  Date of Service 2022  Patient's  1975   46 year old, female    Indication: Sepsis  Day of Therapy: 2  Current vancomycin regimen:  intermittent  Current vancomycin monitoring method: Trough (Method 2 = manual dose calculation)  Current vancomycin therapeutic monitoring goal: 15-20 mg/L    Insight[employer] Prediction of Current Vancomycin Regimen      Current estimated CrCl = Estimated Creatinine Clearance: 81.6 mL/min (A) (based on SCr of 1.29 mg/dL (H)).    Creatinine for last 3 days  2022:  9:22 PM Creatinine 4.46 mg/dL  2022:  6:26 AM Creatinine 2.76 mg/dL  2022:  5:44 AM Creatinine 1.29 mg/dL    Recent Vancomycin Levels (past 3 days)  2022:  5:44 AM Vancomycin 13.2 mg/L    Vancomycin IV Administrations (past 72 hours)                   vancomycin (VANCOCIN) 2,500 mg in sodium chloride 0.9 % 500 mL intermittent infusion (mg) 2,500 mg New Bag 22 0459                Nephrotoxins and other renal medications (From now, onward)    Start     Dose/Rate Route Frequency Ordered Stop    22 0900  vancomycin (VANCOCIN) 1000 mg in dextrose 5% 200 mL PREMIX         1,000 mg  200 mL/hr over 1 Hours Intravenous EVERY 12 HOURS 22 0728      22 1000  piperacillin-tazobactam (ZOSYN) 3.375 g vial to attach to  mL bag        Note to Pharmacy: Extended infusion dosing to start 6 hours after initial infusion.   \"Followed by\" Linked Group Details    3.375 g  over 240 Minutes Intravenous EVERY 8 HOURS 22 0302      22 0100  norepinephrine (LEVOPHED) 4 mg in  mL infusion PREMIX         0.01-0.6 mcg/kg/min × 159.7 kg  6-359.3 mL/hr  Intravenous CONTINUOUS 22 0048               Contrast Orders - past 72 hours (72h ago, onward)            Start     Dose/Rate Route Frequency Ordered Stop    22 1753  iohexol (OMNIPAQUE) 300 mg/mL injection           PRN 22 1832            Interpretation of levels and current " regimen:  Vancomycin level is reflective of subtherapeutic level    Has serum creatinine changed greater than 50% in last 72 hours: Yes    Urine output:  good urine output    Renal Function: Improving    Insight[employer] Prediction of Planned New Vancomycin Regimen    Regimen: 1000 mg IV every 12 hours.  Start time: 07:25 on 02/22/2022  Exposure target: AUC24 (range)400-600 mg/L.hr   AUC24,ss: 517 mg/L.hr  Probability of AUC24 > 400: 100 %  Ctrough,ss: 15.2 mg/L  Probability of Ctrough,ss > 20: 29 %  Probability of nephrotoxicity (Lodise ARMANDO 2009): 10 %      Plan:  1. Increase Dose to 1000 mg q12 hr  2. Vancomycin monitoring method: AUC  3. Vancomycin therapeutic monitoring goal: 400-600 mg*h/L  4. Pharmacy will check vancomycin levels as appropriate in 1-3 Days.  5. Serum creatinine levels will be ordered daily for the first week of therapy and at least twice weekly for subsequent weeks.    Jewels Velasco, RPH

## 2022-02-22 NOTE — ANESTHESIA CARE TRANSFER NOTE
Patient: Bess Enamorado    Procedure: Procedure(s):  CYSTOSCOPY, WITH right RETROGRADE PYELOGRAM AND right URETERAL STENT REPLACEMENT       Diagnosis: Acute renal failure, unspecified acute renal failure type (H) [N17.9]  Hydronephrosis with urinary obstruction due to ureteral calculus [N13.2]  Diagnosis Additional Information: No value filed.    Anesthesia Type:   MAC     Note:    Oropharynx: oropharynx clear of all foreign objects and spontaneously breathing  Level of Consciousness: awake  Oxygen Supplementation: face mask  Level of Supplemental Oxygen (L/min / FiO2): 8      Vital Signs Stable: post-procedure vital signs reviewed and stable  Report to RN Given: handoff report given  Patient transferred to: PACU    Handoff Report: Identifed the Patient, Identified the Reponsible Provider, Reviewed the pertinent medical history, Discussed the surgical course, Reviewed Intra-OP anesthesia mangement and issues during anesthesia, Set expectations for post-procedure period and Allowed opportunity for questions and acknowledgement of understanding      Vitals:  Vitals Value Taken Time   /71 02/21/22 1825   Temp     Pulse 81 02/21/22 1829   Resp 18 02/21/22 1829   SpO2 99 % 02/21/22 1829   Vitals shown include unvalidated device data.    Electronically Signed By: JULIETA Blunt CRNA  February 21, 2022  6:30 PM

## 2022-02-22 NOTE — ANESTHESIA POSTPROCEDURE EVALUATION
Patient: Bess Enamorado    Procedure: Procedure(s):  CYSTOSCOPY, WITH right RETROGRADE PYELOGRAM AND right URETERAL STENT PLACEMENT       Anesthesia Type:  MAC    Note:  Disposition: ICU            ICU Sign Out: Anesthesiologist/ICU physician sign out WAS performed   Postop Pain Control: Uneventful            Sign Out: Well controlled pain   PONV: No   Neuro/Psych: Uneventful            Sign Out: Acceptable/Baseline neuro status   Airway/Respiratory: Uneventful            Sign Out: Acceptable/Baseline resp. status   CV/Hemodynamics: Uneventful            Sign Out: Acceptable CV status; No obvious hypovolemia; No obvious fluid overload   Other NRE: NONE   DID A NON-ROUTINE EVENT OCCUR? No           Last vitals:  Vitals Value Taken Time   BP 92/54 02/21/22 1900   Temp 36.1  C (97  F) 02/21/22 1900   Pulse 87 02/21/22 1900   Resp 17 02/21/22 1900   SpO2 98 % 02/21/22 1900       Electronically Signed By: Doyle Rider MD  February 21, 2022  7:02 PM

## 2022-02-22 NOTE — CONSULTS
"[facility]  WO Nurse Inpatient Wound Assessment     Reason for consultation: Evaluate and treat \"Bilateral leg\"      Assessment  BLE wound due to skin changes related to chronic lymphedema     Treatment Plan  BLE wound: Daily    02/22/22 1108  mineral oil-hydrophilic petrolatum (AQUAPHOR)  Start:  02/22/22 1130,   Topical,   DAILY,   Routine        Admin Instructions: Apply to BLE before lymphedema wraps application          Orders Written  Recommended provider order: None, at this time  WO Nurse follow-up plan: signing off  Nursing to notify the Provider(s) and re-consult the WO Nurse if wound(s) deteriorates or new skin concern.    Patient History  According to provider note(s):  \"46 year old female with medical history sign ificant for HFpEF, history of PE/on Xarelto, chronic lower extremity lymphedema, NARCISA, history of cellulitis, and morbid obesity.  Patient was recently hospitalized from 2/8/2022 to 2/18/2022 for COVID-19, acute respiratory failure, and CHF exacerbation.  She was recovering at [facility], and was found to have decreased urine output.  She was brought to [facility] Emergency Room and was evaluated for HUNG and hypotension requiring pressors. \"    Objective Data  Containment of urine/stool: Indwelling catheter    Active Diet Order:  Orders Placed This Encounter      Advance Diet as Tolerated: Fully Advanced to diet(s) per Provider order; Regular Diet Adult    Output:   I/O last 3 completed shifts:  In: 1647.33 [P.O.:220; I.V.:1427.33]  Out: 3185 [Urine:3185]    Risk Assessment:   Sensory Perception: 4-->no impairment  Moisture: 3-->occasionally moist  Activity: 1-->bedfast  Mobility: 2-->very limited  Nutrition: 2-->probably inadequate  Friction and Shear: 2-->potential problem  Ben Score: 14                          Labs: Recent Labs   Lab 02/22/22  0544 02/20/22 2122   ALBUMIN  --  3.7   HGB 14.2 15.5   WBC 9.2 14.1*       Physical Exam  Skin " inspection: focused BLE    Right lower leg    Left lower leg    Left lateral malleolus     Wound Location:  BLE  Date of last photo:  2/22  Wound History: POA, lymphedema   Measurements (length x width x depth, in cm):  Without open areas, patient reports L lat mal was open prior    Wound Base: epidermis   Tunneling: N/A  Undermining: N/A  Palpation of the wound bed: normal   Periwound skin: dry/scaly and skin changes related to chronic lymphedema  Color: dusky, pink and red  Temperature: warm  Drainage: none  Description of drainage: none  Odor: none  Pain: no complaint of pain during assessment     Interventions  Current support surface: Standard  bariatric bed ordered after consult   Current off-loading measures: Pillows under calves and Pillows  Visual inspection of wound(s) completed  Wound Care: completed by lymphedema treatment - applied different lotion due to aquaphor not available on unit  Supplies: ordered: from pharm  Education provided: plan of care, Moisture management and Hygiene  Discussed plan of care with Patient, Nurse and OT lymphedema     Cassi DEGROOT

## 2022-02-22 NOTE — PLAN OF CARE
Problem: Plan of Care - These are the overarching goals to be used throughout the patient stay.    Goal: Absence of Hospital-Acquired Illness or Injury  Intervention: Prevent Skin Injury  Recent Flowsheet Documentation  Taken 2/22/2022 0600 by María Kelley RN  Body Position:   right   turned  Taken 2/22/2022 0400 by María Kelley RN  Body Position:   position changed independently   weight shifting  Taken 2/22/2022 0000 by María Kelley RN  Body Position:   position changed independently   weight shifting   Goal Outcome Evaluation:    Plan of Care Reviewed With: patient           Placed wound consult.      Problem: Renal Function Impairment (Acute Kidney Injury/Impairment)  Goal: Effective Renal Function  Outcome: Ongoing, Progressing  Intervention: Monitor and Support Renal Function  Recent Flowsheet Documentation  Taken 2/22/2022 0400 by María Kelley RN  Medication Review/Management:   medications reviewed   high-risk medications identified  Taken 2/22/2022 0000 by María Kelley RN  Medication Review/Management:   medications reviewed   high-risk medications identified     Improving kidney function.. Titrating Levophed to keep MAP>65. Tolerated CPAP with 2 LPM Oxygen overnight. Placed on oxymask (refused nasal canula) at 2lpm by 06:00. Oxygen Saturation % with occasional apnea episode then saturation dropped to mid 70's to 80's but easily recovers.

## 2022-02-22 NOTE — PROGRESS NOTES
[facility]    Medicine Progress Note - Hospitalist Service    Date of Admission:  2/20/2022    Assessment & Plan          46-year-old female recently discharged after admitted for Covid19, acute hypoxic respiratory failure and CHF exacerbation from 2/8-2/18/2022 presents with decreased urine output and found to have HUNG and hypotensive.    UTI, Obstructing kidney stone: CT abdomen showing 8 x 7 x 3 mm stone proximal right ureter causing mild hydronephrosis. s/p Cystoscopy with right retrograde pyelogram and right ureteral stent placement.  - Continue Zosyn  - Follow up urine culture  - Urology follow up    Hypotension: due to hypovolemia  - Currently on Levophed. Continue to taper as tolerated    HUNG: due to obstructive uropathy. Creatinine 4.4 on admission. Now improved to 1.29 today.  - Continue IVF and monitor.  - Continue to hold PTA Bumex and lisinopril    Hyperkalemia and hypercalcemia: resolved after IVF    HFpEF: no exacerbation. Metoprolol on hold for hypotension.    Chronic hypoxic respiratory failure: Continue home oxygen supplementation     History of PE: resume Xarelto when appropriate per Urology    NARCISA: CPAP at night    Bilateral chronic lymphedema: Lymphedema consult    History of MS: has chronic left lower extremity weakness and pain. Supportive care    Morbid obesity: BMI 60.4       Diet: Advance Diet as Tolerated: Fully Advanced to diet(s) per Provider order; Regular Diet Adult    DVT Prophylaxis: DOAC  Brar Catheter: PRESENT, indication: Strict 1-2 Hour I&O;/GI/GYN Pelvic Procedure, Strict 1-2 Hour I&O  Central Lines: PRESENT  PICC Triple Lumen 02/21/22 Right Brachial vein medial Vasporessors-Site Assessment: WDL  Cardiac Monitoring: ACTIVE order. Indication: ICU  Code Status: Full Code      Disposition Plan   Expected Discharge: 02/24/2022     Anticipated discharge location:  Awaiting care coordination huddle         The patient's care was discussed with the Bedside  Nurse, Care Coordinator/ and Patient.    Laxmi Madison MD  Hospitalist Service  [facility]  Securely message with the [employer] Web Console (learn more here)  Text page via [employer] Paging/Directory       ______________________________________________________________________    Interval History   Patient reports that she feels better overall. No back pain. Continues to feel weak. No chest pain.     Data reviewed today: I reviewed all medications, new labs and imaging results over the last 24 hours.     Physical Exam   Vital Signs: Temp: 98.3  F (36.8  C) Temp src: Oral BP: 101/62 Pulse: 97   Resp: 18 SpO2: 97 %   Oxygen Delivery: 2 LPM  Weight: 347 lbs 11.2 oz    General appearance: not in acute distress  HEENT: PERRL, EOMI  Lungs: Clear breath sounds in bilateral lung fields  Cardiovascular: Regular rate and rhythm, normal S1-S2  Abdomen: Soft, non tender, no distension, normal bowel sound  Musculoskeletal: No joint swelling  Skin: Chronic bilateral lower leg lymphedema  Neurology: AAO ×3.  Cranial nerves II - XII normal.  Grossly normal muscle strength in all four extremities.    Data   Recent Labs   Lab 02/22/22  0544 02/21/22  0626 02/20/22  2122   WBC 9.2  --  14.1*   HGB 14.2  --  15.5   MCV 89  --  89     --  322    136 134*   POTASSIUM 4.7 3.4* 5.6*   CHLORIDE 104 106 92*   CO2 23 17* 21*   BUN 33* 41* 47*   CR 1.29* 2.76* 4.46*   ANIONGAP 10 13 21*   EVITA 9.2 7.3* 11.0*    108 97   ALBUMIN  --   --  3.7   PROTTOTAL  --   --  8.1*   BILITOTAL  --   --  1.8*   ALKPHOS  --   --  66   ALT  --   --  19   AST  --   --  31

## 2022-02-22 NOTE — PLAN OF CARE
Goal Outcome Evaluation:    Plan of Care Reviewed With: patient          Outcome Evaluation: Off pressors this am at 0939. Hypotension at rest this afternoon, fluid bolus given with good effect.

## 2022-02-23 ENCOUNTER — APPOINTMENT (OUTPATIENT)
Dept: OCCUPATIONAL THERAPY | Facility: HOSPITAL | Age: 47
DRG: 853 | End: 2022-02-23
Payer: COMMERCIAL

## 2022-02-23 ENCOUNTER — APPOINTMENT (OUTPATIENT)
Dept: PHYSICAL THERAPY | Facility: HOSPITAL | Age: 47
DRG: 853 | End: 2022-02-23
Attending: INTERNAL MEDICINE
Payer: COMMERCIAL

## 2022-02-23 PROBLEM — I50.32 CHRONIC DIASTOLIC CONGESTIVE HEART FAILURE (H): Status: ACTIVE | Noted: 2022-02-09

## 2022-02-23 PROBLEM — I89.0 LYMPHEDEMA OF BOTH LOWER EXTREMITIES: Status: ACTIVE | Noted: 2020-06-15

## 2022-02-23 LAB
ANION GAP SERPL CALCULATED.3IONS-SCNC: 9 MMOL/L (ref 5–18)
BUN SERPL-MCNC: 27 MG/DL (ref 8–22)
CALCIUM SERPL-MCNC: 9.3 MG/DL (ref 8.5–10.5)
CHLORIDE BLD-SCNC: 107 MMOL/L (ref 98–107)
CO2 SERPL-SCNC: 24 MMOL/L (ref 22–31)
CREAT SERPL-MCNC: 0.78 MG/DL (ref 0.6–1.1)
ERYTHROCYTE [DISTWIDTH] IN BLOOD BY AUTOMATED COUNT: 15.5 % (ref 10–15)
GFR SERPL CREATININE-BSD FRML MDRD: >90 ML/MIN/1.73M2
GLUCOSE BLD-MCNC: 91 MG/DL (ref 70–125)
HCT VFR BLD AUTO: 42.7 % (ref 35–47)
HGB BLD-MCNC: 12.7 G/DL (ref 11.7–15.7)
MCH RBC QN AUTO: 27 PG (ref 26.5–33)
MCHC RBC AUTO-ENTMCNC: 29.7 G/DL (ref 31.5–36.5)
MCV RBC AUTO: 91 FL (ref 78–100)
PLATELET # BLD AUTO: 202 10E3/UL (ref 150–450)
POTASSIUM BLD-SCNC: 4.5 MMOL/L (ref 3.5–5)
RBC # BLD AUTO: 4.7 10E6/UL (ref 3.8–5.2)
SODIUM SERPL-SCNC: 140 MMOL/L (ref 136–145)
WBC # BLD AUTO: 5.8 10E3/UL (ref 4–11)

## 2022-02-23 PROCEDURE — 97162 PT EVAL MOD COMPLEX 30 MIN: CPT | Mod: GP

## 2022-02-23 PROCEDURE — 250N000013 HC RX MED GY IP 250 OP 250 PS 637: Performed by: STUDENT IN AN ORGANIZED HEALTH CARE EDUCATION/TRAINING PROGRAM

## 2022-02-23 PROCEDURE — 99232 SBSQ HOSP IP/OBS MODERATE 35: CPT | Mod: GC | Performed by: INTERNAL MEDICINE

## 2022-02-23 PROCEDURE — 97535 SELF CARE MNGMENT TRAINING: CPT | Mod: GO

## 2022-02-23 PROCEDURE — 99233 SBSQ HOSP IP/OBS HIGH 50: CPT | Performed by: INTERNAL MEDICINE

## 2022-02-23 PROCEDURE — 97110 THERAPEUTIC EXERCISES: CPT | Mod: GP

## 2022-02-23 PROCEDURE — 120N000004 HC R&B MS OVERFLOW

## 2022-02-23 PROCEDURE — 250N000011 HC RX IP 250 OP 636: Performed by: STUDENT IN AN ORGANIZED HEALTH CARE EDUCATION/TRAINING PROGRAM

## 2022-02-23 PROCEDURE — 80048 BASIC METABOLIC PNL TOTAL CA: CPT | Performed by: INTERNAL MEDICINE

## 2022-02-23 PROCEDURE — 85014 HEMATOCRIT: CPT | Performed by: INTERNAL MEDICINE

## 2022-02-23 PROCEDURE — 94660 CPAP INITIATION&MGMT: CPT

## 2022-02-23 PROCEDURE — 999N000157 HC STATISTIC RCP TIME EA 10 MIN

## 2022-02-23 PROCEDURE — 250N000013 HC RX MED GY IP 250 OP 250 PS 637: Performed by: NURSE PRACTITIONER

## 2022-02-23 PROCEDURE — 250N000013 HC RX MED GY IP 250 OP 250 PS 637: Performed by: SPECIALIST

## 2022-02-23 RX ADMIN — RIVAROXABAN 15 MG: 15 TABLET, FILM COATED ORAL at 18:23

## 2022-02-23 RX ADMIN — SERTRALINE HYDROCHLORIDE 100 MG: 100 TABLET ORAL at 09:04

## 2022-02-23 RX ADMIN — ACETAMINOPHEN 975 MG: 325 TABLET ORAL at 03:17

## 2022-02-23 RX ADMIN — GABAPENTIN 200 MG: 100 CAPSULE ORAL at 09:04

## 2022-02-23 RX ADMIN — GABAPENTIN 200 MG: 100 CAPSULE ORAL at 20:02

## 2022-02-23 RX ADMIN — PIPERACILLIN SODIUM AND TAZOBACTAM SODIUM 3.38 G: 3; .375 INJECTION, POWDER, LYOPHILIZED, FOR SOLUTION INTRAVENOUS at 18:17

## 2022-02-23 RX ADMIN — MICONAZOLE NITRATE: 20 POWDER TOPICAL at 09:04

## 2022-02-23 RX ADMIN — PIPERACILLIN SODIUM AND TAZOBACTAM SODIUM 3.38 G: 3; .375 INJECTION, POWDER, LYOPHILIZED, FOR SOLUTION INTRAVENOUS at 03:14

## 2022-02-23 RX ADMIN — ACETAMINOPHEN 975 MG: 325 TABLET ORAL at 20:02

## 2022-02-23 RX ADMIN — GABAPENTIN 200 MG: 100 CAPSULE ORAL at 16:33

## 2022-02-23 RX ADMIN — MICONAZOLE NITRATE: 20 POWDER TOPICAL at 20:03

## 2022-02-23 RX ADMIN — WHITE PETROLATUM: 1.75 OINTMENT TOPICAL at 09:05

## 2022-02-23 RX ADMIN — RIVAROXABAN 15 MG: 15 TABLET, FILM COATED ORAL at 09:04

## 2022-02-23 RX ADMIN — PIPERACILLIN SODIUM AND TAZOBACTAM SODIUM 3.38 G: 3; .375 INJECTION, POWDER, LYOPHILIZED, FOR SOLUTION INTRAVENOUS at 09:04

## 2022-02-23 ASSESSMENT — ACTIVITIES OF DAILY LIVING (ADL)
ADLS_ACUITY_SCORE: 15

## 2022-02-23 NOTE — PROGRESS NOTES
Care Management Follow Up    Length of Stay (days): 2    Expected Discharge Date: 02/24/2022 or 2/25/2022       Concerns to be Addressed:   Urology following due to suspected infected stone (post Cystoscopy with right retrograde pyelogram and right ureteral stent placement on 2/21/2022): cultures pending, IV Zosyn.   Patient plan of care discussed at interdisciplinary rounds: Yes  Follow up from rounds/notes:   Per intensivist note, okay to transfer out of ICU.     Anticipated Discharge Disposition:  Patient's goal is a home discharge with resumed home care.      Anticipated Discharge Services:  Home nursing (resumed) plus PT, OT and a home health aide.  Anticipated Discharge DME:  Per therapy (if indicated).    Patient/family educated on Medicare website which has current facility and service quality ratings:  NA  Education Provided on the Discharge Plan:  Per team.  Patient/Family in Agreement with the Plan:  Yes    Referrals Placed by CM/SW:  [facility];   Private pay costs discussed: Not applicable at this time.     Additional Information:  Met with patient on 2/22/2022 to confirm her goals for discharge. She plans to return home at discharge and anticipates family transport. Patient lives alone in her house and has RN/PT/OT/HHA with [facility] (confirmed with intake). She has home oxygen from [facility] (which had been using only at night). Patient uses a walker to ambulate and does not drive. She also has PCA support (currently her sister is her PCA). She has a commode chair, rolling walker and lift recliner at home. She is looking in to getting a hospital bed.     Anne Lopez RN

## 2022-02-23 NOTE — PROGRESS NOTES
ICU PROGRESS NOTE:        Assessment/Plan:     Changes for Today: transfer out of ICU          Neuro:    No acute issues    Cautious use of narcotics    Avoid benzo's    Tylenol prn and scheduled      Pulmonary:    History of NARCISA, patient states she wears CPAP for sleep, but is not on supplemental oxygen otherwise.    Goal SpO2 of 92% or greater    Continue CPAP for sleep    CXR benign on admission         Cardiac:    Hypotension secondary to circulatory shock    On recent admission patent was aggressively diuresed to 16 liters. Now with obstructing stone likely causing more renal dysfunction and hypotension    Off pressor - discontinue    ECHO on 2/9 showed preserved EF. Mildly decreased right ventricular systolic function.      Lactic acid level WNL, no need to trend    History of hypertension. Lisinopril and Metoprolol on hold. Resume once out of ICU     GI:    No acute issues    Cont regular diet    Bowel regimen prn      Renal:    HUNG secondary to obstructing right ureteral stone with mild hydronephrosis     Normal Left Ureter and Kidney    Creatinine normal at baseline.  4.46 on admission, down to 2.76 later on 2/21 with IVF and pressors, improved perfusion. Now after stent, improved even further this am to 1.29 -> 0.78 today    Patent making urine, negative 1.5 liters since admission     Continue to trend  renal function    Urology and Nephrology consulted: appreciate their assistance     Hypocalcemia, resolved.  Given 1 Gm calcium gluconate IV on 2/21    Hyperkalemia, resolved.     Right ureteral stent placed 2/21.  This, per urology will be in place for at least 2 weeks, followed by ureteroscopic stone extraction with laser lithotripsy.     Remove jiang today.     ID:    Blood cultures times 2 in process, negative thus far.     Urine showing few bacteria and blood    Started on Zosyn and Vanco given recent admission.  Will continue Zosyn for now. Plan for empiric 7 or 8 day course for septic shock  NOS     Heme/Coag:    Patent is anticoagulated on Xarelto.  This is for history of PE. Continue. OK'd to resume by urology.     Endocrine:    No acute issues    FSBG checks, insulin sliding scale/drip per ICU protocol        DVT Prophylaxis: home xarelto     PPI:  Not required     Peridex:  Not required               Lines/Drains/Tubes:  Peripheral IV 02/20/22 Right Hand     Peripheral IV 02/20/22 Left Lower forearm     PICC Triple Lumen 02/21/22 Right Brachial vein medial Vasporessors     Brar Catheter, placed in OR, 2/21 -REMOVE today  Ureteral stent, placed 2/21            CODE STATUS:  FULL      SUBJECTIVE:    Ccx: just woke up, no pain, wore home CPAP     HPI:    Bess Enamorado is a 46 year old female with medical history sign ificant for HFpEF, history of PE/on Xarelto, chronic lower extremity lymphedema, NARCISA, history of cellulitis, and morbid obesity.  Patient was recently hospitalized from 2/8/2022 to 2/18/2022 for COVID-19, acute respiratory failure, and CHF exacerbation.  She was recovering at [facility], and was found to have decreased urine output.  She was brought to [facility] Emergency Room and was evaluated for HUNG and hypotension requiring pressors.    Patient started having decreased UOP on 2/19, and no urine 2/20.  She was taking her Bumex as prescribed.  No other complaints.  She is currently in no pain and is having no nausea or vomiting.  Denies shortness of breath or chest pain.   OR on 2/21 for cystoscopy/pyelogram with right ureteral stent placement.         Past Medical History:   Diagnosis Date     Acute on chronic diastolic congestive heart failure (H) 2/9/2022     Arthritis 2019    Hips     ASCUS favor benign 8/2015    Neg HPV     Depressive disorder 2010     H/O colposcopy with cervical biopsy 9/14/15    Bx & ECC - negative     Hypertension 2019     LSIL on Pap smear 7/2014    Neg high risk HPV     Multiple sclerosis (H) 8/29/2005 9/18/200pt dx with MS 2004--dx by  liza and is followed by Dr. Steven Ayala 10/2016     UNSPEC CONSTIPATION 9/18/2006 9/18/2006   Has tried otc suppository and otc lax.      Past Surgical History:   Procedure Laterality Date     CHOLECYSTECTOMY, LAPOROSCOPIC      Cholecystectomy, Laparoscopic     COLONOSCOPY  2007?    Bathroom issue..results normal     COMBINED CYSTOSCOPY, RETROGRADES, EXCHANGE STENT URETER(S) Right 2/21/2022    Procedure: CYSTOSCOPY, WITH right RETROGRADE PYELOGRAM AND right URETERAL STENT PLACEMENT;  Surgeon: Doyle Palomares MD;  [place] [facility] [place]     OPEN REDUCTION INTERNAL FIXATION TOE(S)  4/13/2012    Procedure:OPEN REDUCTION INTERNAL FIXATION TOE(S); Open reduction internal fixation right proximal fifth metatarsal fracture-   Anes-choice block; Surgeon:LEY, JEFFREY DUANE; Location:[place] OR     PICC TRIPLE LUMEN PLACEMENT  2/21/2022          Current Facility-Administered Medications   Medication     acetaminophen (TYLENOL) Suppository 650 mg     [START ON 2/24/2022] acetaminophen (TYLENOL) tablet 650 mg     acetaminophen (TYLENOL) tablet 975 mg     bisacodyl (DULCOLAX) Suppository 10 mg     [START ON 2/25/2022] COVID-19 mRNA vacc (Moderna) injection 0.25 mL     diphenhydrAMINE (BENADRYL) tablet 50 mg    Or     diphenhydrAMINE (BENADRYL) injection 50 mg     EPINEPHrine (ADRENALIN) kit 0.3 mg     gabapentin (NEURONTIN) capsule 200 mg     HYDROmorphone (DILAUDID) injection 0.2 mg    Or     HYDROmorphone (DILAUDID) injection 0.4 mg     iohexol (OMNIPAQUE) 300 mg/mL injection     lidocaine (LMX4) cream     lidocaine (LMX4) cream     lidocaine 1 % 0.1-1 mL     lidocaine 1 % 0.1-5 mL     magnesium hydroxide (MILK OF MAGNESIA) suspension 30 mL     melatonin tablet 1 mg     [Held by provider] metoprolol succinate ER (TOPROL-XL) 24 hr tablet 25 mg     miconazole (MICATIN) 2 % powder     mineral oil-hydrophilic petrolatum (AQUAPHOR)     naloxone (NARCAN) injection 0.2 mg    Or     naloxone (NARCAN) injection 0.4 mg     Or     naloxone (NARCAN) injection 0.2 mg    Or     naloxone (NARCAN) injection 0.4 mg     ondansetron (ZOFRAN-ODT) ODT tab 4 mg    Or     ondansetron (ZOFRAN) injection 4 mg     Patient is already receiving anticoagulation with heparin, enoxaparin (LOVENOX), warfarin (COUMADIN)  or other anticoagulant medication     piperacillin-tazobactam (ZOSYN) 3.375 g vial to attach to  mL bag     [Held by provider] polyethylene glycol (MIRALAX) Packet 17 g     prochlorperazine (COMPAZINE) injection 10 mg    Or     prochlorperazine (COMPAZINE) tablet 10 mg     rivaroxaban ANTICOAGULANT (XARELTO) tablet 15 mg     [Held by provider] senna-docusate (SENOKOT-S/PERICOLACE) 8.6-50 MG per tablet 1 tablet     sertraline (ZOLOFT) tablet 100 mg     sodium chloride (PF) 0.9% PF flush 10-20 mL     sodium chloride (PF) 0.9% PF flush 10-40 mL     sodium chloride (PF) 0.9% PF flush 10-40 mL     sodium chloride (PF) 0.9% PF flush 10-40 mL     sodium chloride (PF) 0.9% PF flush 3 mL     sodium chloride (PF) 0.9% PF flush 3 mL     sodium polystyrene (KAYEXALATE) suspension 15 g     traMADol (ULTRAM) tablet 50 mg        Allergies   Allergen Reactions     Nkda [No Known Drug Allergies]      Family History   Problem Relation Age of Onset     Neurologic Disorder Father         MS     Heart Disease Father         irregular heart rate     Diabetes Father      Melanoma Father      Sleep Apnea Father      Other Cancer Father      Cancer Maternal Grandfather         lung     Other Cancer Maternal Grandfather      Cancer Paternal Grandmother         stomach     Other Cancer Paternal Grandmother      Cancer Mother      Other Cancer Mother      Thyroid Disease Mother      Gynecology Maternal Aunt         PCOS     Hypertension Maternal Grandmother      Anxiety Disorder Maternal Grandmother      Thyroid Disease Maternal Grandmother      Anxiety Disorder Sister          PHYSICAL ASSESSMENT:  General: appears stated age, alert, cooperative  Lungs:   CTA  Heart: RRR, S1 and S2   Abdomen: Soft, non-tender.  Bowel sounds present X 4 quadrants.  Skin: skin color normal, texture normal, no rashes or lesions.   Extremities:  Lymphedema BLE. Reddened, scaling.   Pulses:  +2 BUE and +2 BLE  Neuro: Grossly intact, denies pain.     Temp: 97.6  F (36.4  C) Temp src: Oral BP: 112/67 Pulse: 83   Resp: 16 SpO2: 96 % O2 Device: BiPAP/CPAP Oxygen Delivery: 2 LPM        Intake/Output Summary (Last 24 hours) at 2/22/2022 0857  Last data filed at 2/22/2022 0800  Gross per 24 hour   Intake 1732.23 ml   Output 2440 ml   Net -707.77 ml     Temp: 97.6  F (36.4  C) Temp src: Oral BP: 112/67 Pulse: 83   Resp: 16 SpO2: 96 % O2 Device: BiPAP/CPAP Oxygen Delivery: 2 LPM      Lab Results   Component Value Date    WBC 9.2 02/22/2022    WBC 7.3 03/07/2021     Lab Results   Component Value Date    RBC 5.20 02/22/2022    RBC 4.91 03/07/2021     Lab Results   Component Value Date    HGB 14.2 02/22/2022    HGB 12.5 03/07/2021     Lab Results   Component Value Date    HCT 46.3 02/22/2022    HCT 42.8 03/07/2021     No components found for: MCT  Lab Results   Component Value Date    MCV 89 02/22/2022    MCV 87 03/07/2021     Lab Results   Component Value Date    MCH 27.3 02/22/2022    MCH 25.5 03/07/2021     Lab Results   Component Value Date    MCHC 30.7 02/22/2022    MCHC 29.2 03/07/2021     Lab Results   Component Value Date    RDW 15.7 02/22/2022    RDW 15.6 03/07/2021     Lab Results   Component Value Date     02/22/2022     03/08/2021       Last Comprehensive Metabolic Panel:  Sodium   Date Value Ref Range Status   02/23/2022 140 136 - 145 mmol/L Final   03/07/2021 139 133 - 144 mmol/L Final     Potassium   Date Value Ref Range Status   02/23/2022 4.5 3.5 - 5.0 mmol/L Final   03/07/2021 4.3 3.4 - 5.3 mmol/L Final     Chloride   Date Value Ref Range Status   02/23/2022 107 98 - 107 mmol/L Final   03/07/2021 105 94 - 109 mmol/L Final     Carbon Dioxide   Date Value Ref Range  Status   03/07/2021 30 20 - 32 mmol/L Final     Carbon Dioxide (CO2)   Date Value Ref Range Status   02/23/2022 24 22 - 31 mmol/L Final     Anion Gap   Date Value Ref Range Status   02/23/2022 9 5 - 18 mmol/L Final   03/07/2021 4 3 - 14 mmol/L Final     Glucose   Date Value Ref Range Status   02/23/2022 91 70 - 125 mg/dL Final   03/07/2021 99 70 - 99 mg/dL Final     Urea Nitrogen   Date Value Ref Range Status   02/23/2022 27 (H) 8 - 22 mg/dL Final   03/07/2021 12 7 - 30 mg/dL Final     Creatinine   Date Value Ref Range Status   02/23/2022 0.78 0.60 - 1.10 mg/dL Final   03/07/2021 0.62 0.52 - 1.04 mg/dL Final     GFR Estimate   Date Value Ref Range Status   02/23/2022 >90 >60 mL/min/1.73m2 Final     Comment:     Effective December 21, 2021 eGFRcr in adults is calculated using the 2021 CKD-EPI creatinine equation which includes age and gender (Maria Teresa et al., NEJ, DOI: 10.1056/LFFBfr1740444)   03/07/2021 >90 >60 mL/min/[1.73_m2] Final     Comment:     Non  GFR Calc  Starting 12/18/2018, serum creatinine based estimated GFR (eGFR) will be   calculated using the Chronic Kidney Disease Epidemiology Collaboration   (CKD-EPI) equation.       Calcium   Date Value Ref Range Status   02/23/2022 9.3 8.5 - 10.5 mg/dL Final   03/07/2021 9.4 8.5 - 10.1 mg/dL Final       Last Arterial Blood Gas:  pH Arterial   Date Value Ref Range Status   02/08/2022 7.46 (H) 7.37 - 7.44 Final     pCO2 Arterial   Date Value Ref Range Status   02/08/2022 34 (L) 35 - 45 mm Hg Final     pO2 Arterial   Date Value Ref Range Status   02/08/2022 63 (L) 80 - 90 mm Hg Final     Bicarbonate Arterial   Date Value Ref Range Status   02/08/2022 25 23 - 29 mmol/L Final     O2 Sat, Arterial   Date Value Ref Range Status   02/08/2022 94.2 (L) 96.0 - 97.0 % Final     Base Excess/Deficit (+/-)   Date Value Ref Range Status   02/08/2022 0.0   mmol/L Final       Troponin I ES   Date Value Ref Range Status   03/04/2021 <0.015 0.000 - 0.045 ug/L Final      Comment:     The 99th percentile for upper reference range is 0.045 ug/L.  Troponin values   in the range of 0.045 - 0.120 ug/L may be associated with risks of adverse   clinical events.       EXAM: CT ABDOMEN PELVIS W/O CONTRAST  LOCATION: [facility]  DATE/TIME: 2/21/2022 5:23 AM     INDICATION: Diminished urine output. Acute renal failure and hypotension.  COMPARISON: CTA chest 02/08/2022. CT abdomen and pelvis 10/22/2018.  TECHNIQUE: CT scan of the abdomen and pelvis was performed without IV contrast. Multiplanar reformats were obtained. Dose reduction techniques were used.  CONTRAST: None.     FINDINGS:   LOWER CHEST: Normal.     HEPATOBILIARY: Hepatic steatosis. Postcholecystectomy. No biliary dilatation.     PANCREAS: Normal.     SPLEEN: Normal.     ADRENAL GLANDS: Normal.     KIDNEYS/BLADDER: 8 x 7 x 3 mm stone in the proximal right ureter causes mild hydronephrosis. Normal left kidney and bladder.     BOWEL: Colonic diverticulosis. No bowel obstruction or inflammation. Normal appendix.     LYMPH NODES: Normal.     VASCULATURE: Unremarkable.     PELVIC ORGANS: No change in the 9.4 cm left adnexal mass which has fat, soft tissue, and calcified components.     MUSCULOSKELETAL: Normal.                                                                      IMPRESSION:   1.  8 x 7 x 3 mm stone in the proximal right ureter causes mild hydronephrosis.  2.  Colonic diverticulosis.  3.  Hepatic steatosis.  4.  No significant change in the 9.4 cm left ovarian dermoid.        EXAM: XR CHEST PORT 1 VIEW  LOCATION: [facility]  DATE/TIME: 2/20/2022 9:43 PM     INDICATION: Mildly increased dyspnea, suspect hypervolemia  COMPARISON: Chest CTA 02/08/2022, 03/05/2021, chest x-ray 03/04/2021                                                                      IMPRESSION: Large body habitus. Shallow inspiration with crowding of pulmonary vessels. No signs of pneumonia or failure.  Heart and pulmonary vascularity are normal.            [place] (Jean-Pierre) MD [place]  [facility]  Clinic [phone]  Fax [phone]      Pt is not critically ill today.

## 2022-02-23 NOTE — PROGRESS NOTES
RENAL PROGRESS NOTE      ASSESSMENT & PLAN:   This is a 46 year old female with past medical history significant for morbid obesity,  congestive heart failure, chronic lymphedema of lower extremities, hypertension, PE on chronic articulation of Xarelto, obstructive apnea on CPAP at night and multiple sclerosis who was recently 02/08-02/18/2022 admitted for COVID-19 infection, acute hypoxic respiratory failure and congestive heart failure exacerbation. Patient presented for further evaluation of inability to urinate.  Patient was found to have sepsis and acute kidney injury.  Nephrology service consulted for acute kidney injury.    Acute renal failure-likely multifactorial secondary to prerenal etiology from septic shock in settings of diuretics and ACE inhibitor's use and obstructive uropathy.  Patient baseline serum creatinine 0.8 mg/dL.  Patient presents with creatinine of 4.46 mg/dL.    Resolved. Serum creatinine is trending down to 0.78 mg/dL this am.  Urine output in nonoliguric range w/o diuretics.  S/p cystoscopy with a right ureteral stent placement 02/21  Patient is history of intermittent microscopic hematuria he is going back to June 2011. Likely secondary to nephrolithiasis vs menorrhagia.  Urinalysis on February 80,022 showed 22 red blood cells per hpf, no proteinuria.  Urinalysis in March 2021 showed 120 g/g of protein and 22 red blood cells per hpf.  UA 02/21 showed trace proteinuria and no hematuria. UPCR is 0.3 g/g.  No indication for initiation of dialysis.    Continue to hold prior to admission Bumex and lisinopril  Monitor renal function and urine output daily  Avoid nephrotoxins and renally dose medications.     Obstructive uropathy-CT abdomen pelvis showed 8 x 7 by extremity of proximal right ureteral stone with mild hydronephrosis.  S/p cystoscopy with a right ureteral stent placement 02/21.Having gross Hematuria. Hgb is wnl but trending down.  Urology is following, input  appreciated     Hyperkalemia-mild.  Likely secondary acute kidney injury and metabolic acidosis.  Status post Kayexalate. Resolved.     Metabolic acidosis-likely secondary to HUNG.  Resolved. Serum bicarbonate is 24 this morning.     Septic shock-etiology unclear.  Chest x-ray showed no evidence of pulmonary infiltrates. Patient was recently treated for lower extremity cellulitis and discharged on cefuroxime.  Blood and urine cultures are negative to date. Remains off Levophed drip since 02/22. On broad-spectrum IV antibiotics.     Continue to hold prior to admission lisinopril and Bumex  Management as per ICU team     Volume status-hard to assess given patient body habitus.  Patient with history of diastolic just of heart failure.  Patient reports improved lower extremity edema last 3 days.  Prior admission was taken 2 mg of Bumex twice a day. Net 1.2L negative since admission. Wt is not checked this am. Continue to hold prior to admission diuretics given acute kidney injury.  Monitor ins and outs  -Daily weight  -Low-sodium diet     History of hypertension-prior to admission patient was taken  Lisinopril 10 mg daily, metoprolol succinate 25 mg daily and Bumex 2 mg BID. PTA antihypertensive medications on hold. BP is within acceptable range this am.     Hypercalcemia-mild.  Patient had a serum calcium of 11 on admission.  Resolved with hydration.      History of hypomagnesemia-prior to mission was taken magnesium oxide 400 g twice daily.  Mg level was wnl at 2.4 on 02/0.  Mg supplements oh hold given HUNG.       History of pulmonary embolism-on chronic anticoagulation with Xarelto.     History of sleep apnea on CPAP at night     Jodee Clark MD  [facility]  [address] [place] [address], [address]  [place] [address]  Phone# [phone]  Fax# [phone]     SUBJECTIVE:   No acute issues overnight.  Patient states that she is feeling better, reports improved lower extremity edema.  Patient  denies: fever, chills,  dizziness,  cough, shortness of breath , chest pain, palpitations, orthopnea, nausea, vomiting, abdominal pain.    OBJECTIVE:  Physical Exam   Temp: 97.6  F (36.4  C) Temp src: Oral BP: 112/67 Pulse: 83   Resp: 16 SpO2: 96 % O2 Device: BiPAP/CPAP Oxygen Delivery: 2 LPM  Vitals:    02/20/22 2056 02/22/22 0600   Weight: (!) 159.7 kg (352 lb) (!) 157.7 kg (347 lb 11.2 oz)     Vital Signs with Ranges  Temp:  [97.6  F (36.4  C)-98.6  F (37  C)] 97.6  F (36.4  C)  Pulse:  [] 83  Resp:  [16] 16  BP: ()/(49-68) 112/67  SpO2:  [94 %-99 %] 96 %  I/O last 3 completed shifts:  In: 1324.9 [P.O.:720; I.V.:604.9]  Out: 1175 [Urine:1175]    @TMAXR(24)@    Patient Vitals for the past 72 hrs:   Weight   02/22/22 0600 (!) 157.7 kg (347 lb 11.2 oz)   02/20/22 2056 (!) 159.7 kg (352 lb)   [unfilled]    PHYSICAL EXAM:  Alert/oriented x 3; awake and NAD, super morbidly obese  HEENT NC/AT; perrla; OP clear without lesions; mmm  Neck supple without LAD, TM  CV; RRR without rub or murmur  Lung: clear and equal; no extra sounds  Ab: Large abdominal pannus, soft and NT; not distended; normal bs  : Brar catheter in place, making cherry color urine.  Ext:Le wrapped, improved ++ LE edema and well perfused  Skin; erythema of anterior shins of is a cobblestone appearance consistent with stasis dermatitis  Neuro; grossly intact    LABORATORY STUDIES:     Recent Labs   Lab 02/23/22  0530 02/22/22  0544 02/20/22 2122   WBC 5.8 9.2 14.1*   RBC 4.70 5.20 5.77*   HGB 12.7 14.2 15.5   HCT 42.7 46.3 51.4*    264 322       Basic Metabolic Panel:  Recent Labs   Lab 02/23/22  0530 02/22/22  0544 02/21/22  0626 02/20/22 2122 02/18/22  0555 02/17/22  0538    137 136 134*  --  138   POTASSIUM 4.5 4.7 3.4* 5.6* 4.2 3.9  4.0   CHLORIDE 107 104 106 92*  --  98   CO2 24 23 17* 21*  --  27   BUN 27* 33* 41* 47*  --  17   CR 0.78 1.29* 2.76* 4.46*  --  0.85   GLC 91 119 108 97  --  102   EVITA 9.3 9.2 7.3* 11.0*  --   9.9       INRNo lab results found in last 7 days.     Recent Labs   Lab Test 02/23/22  0530 02/22/22  0544 02/14/22  0510 02/11/22  0502   INR  --   --   --  1.23*   WBC 5.8 9.2   < > 5.9   HGB 12.7 14.2   < > 12.6    264   < > 219    < > = values in this interval not displayed.       Personally reviewed current labs     ~ 35 minutes spent in exam, POC, education regarding renal disease and management.  >90% time spent in education and counseling and/or discussion with patient's care team    Jodee Clark MD  [facility]  [address], [address]  [place] [address]  Phone# [phone]  Fax# [phone]

## 2022-02-23 NOTE — PROGRESS NOTES
Place of Service:  [facility]     Reason for follow up: suspected infected stone, POD 2 s/p Cystoscopy with right retrograde pyelogram and right ureteral stent placement    SUBJECTIVE:  Events: no acute events overnight    Patient reports tolerating stent. No fevers, nausea or vomiting. She does have increased hematuria today .    OBJECTIVE:  PHYSICAL EXAM:  Temp: 98.2  F (36.8  C) Temp src: Oral BP: 136/73 Pulse: 93   Resp: 16 SpO2: 96 % O2 Device: BiPAP/CPAP Oxygen Delivery: 2 LPM  General: NAD, alert, cooperative, alert and smiling.   Head: normocephalic, without abnormality / atraumatic  Abdomen: soft, non tender, non distended. no suprapubic fullness, no suprapubic tenderness. no CVA tenderness,   Genitourinary:jiang catheter in place with brownish cherry colored urine intubing and bag  Skin: No rashes or lesions  Musculoskeletal: moves all four extremities equally; no calf edema or tenderness  Psychological: alert and oriented, answers questions appropriately    LABS:  Creatinine   Date Value Ref Range Status   02/23/2022 0.78 0.60 - 1.10 mg/dL Final   03/07/2021 0.62 0.52 - 1.04 mg/dL Final     WBC   Date Value Ref Range Status   03/07/2021 7.3 4.0 - 11.0 10e9/L Final     WBC Count   Date Value Ref Range Status   02/23/2022 5.8 4.0 - 11.0 10e3/uL Final     Hemoglobin   Date Value Ref Range Status   02/23/2022 12.7 11.7 - 15.7 g/dL Final   03/07/2021 12.5 11.7 - 15.7 g/dL Final     Platelet Count   Date Value Ref Range Status   02/23/2022 202 150 - 450 10e3/uL Final   03/08/2021 223 150 - 450 10e9/L Final       UA:  UA RESULTS:  Recent Labs   Lab Test 02/21/22  1237 02/24/20  1314 02/13/20  1647   COLOR Colorless   < > Yellow   APPEARANCE Clear   < > Cloudy   URINEGLC Negative   < > Negative   URINEBILI Negative   < > Negative   URINEKETONE Negative   < > Negative   SG 1.010   < > 1.020   UBLD 0.2 mg/dL*   < > Negative   URINEPH 5.5   < > 7.0   PROTEIN 10 *   < > Trace*   UROBILINOGEN  --   --  2.0*    NITRITE Negative   < > Negative   LEUKEST Negative   < > Trace*   RBCU <1   < > 2-5*   WBCU 7*   < > 10-25*    < > = values in this interval not displayed.         Cultures:  Urine no growth to date    Blood no growth to date    Lab Results: personally reviewed.     ASSESSMENT/PLAN:  Bess Enamorado is being seen by [facility] for suspected infected stone, POD 2 s/p Cystoscopy with right retrograde pyelogram and right ureteral stent placement    -Patient restarted Xeralto yesterday given history of PE  -Hg stable 12.7, was 14.2 yesterday. Drop could have dilutional component. Monitor  -Creatinine normalized 0.78, 1.29 yestreday  - Hematuria, monitor with Hg. Hematuria expected with recent stent however risk of increased bleeding given anticoagulation, may have to consider holding anticoagulation if worsens               - Recommend Manually irrigate jiang catheter with 30 ml saline and aspirate (may repeat up to 6 times) scheduled q4 hours and sooner if needed for deep red urine or large clots. Please notify [facility] with any questions/concerns:[phone].  - Abnormal UA, urine culture no growth to date, blood cultures no growth to date.  currently on Zosyn. Do suspect infected stone.  Appreciate primary team to continue to manage antibiotics will need extended course for suspected complicated UTI.  -Right ureteral stent placed 2/21, will be in place for at least 2 weeks, followed by definitive management with ureteroscopic stone extraction with laser lithotripsy. Office staff will contact patient to schedule procedure  -Urology will continue to follow      Flora Wei PA-C  [facility]   [phone]

## 2022-02-23 NOTE — PROGRESS NOTES
This is a recent snapshot of the patient's [facility] medical record.  For current drug dose and complete information and questions, call [phone] or In Basket pool, [facility] home infusion ([id number])  CSN Number:  [id number]

## 2022-02-23 NOTE — PROGRESS NOTES
[facility]    Medicine Progress Note - Hospitalist Service    Date of Admission:  2/20/2022    Assessment & Plan          46-year-old female recently discharged after admitted for Covid19, acute hypoxic respiratory failure and CHF exacerbation from 2/8-2/18/2022 presents with decreased urine output and found to have HUNG and hypotensive.    UTI, Obstructing kidney stone: CT abdomen showing 8 x 7 x 3 mm stone proximal right ureter causing mild hydronephrosis. s/p Cystoscopy with right retrograde pyelogram and right ureteral stent placement. Developed hematuria after resuming Xarelto on 2/22.  - Continue Zosyn  - Follow up urine culture sent on 2/21: no growth  - Follow up blood culture sent on 2/20: no growth so far  - Per Urology, manual bladder irrigation    Hypotension: due to hypovolemia. Needed Levophed previously and now tapered off.   - Monitor blood pressure.    HUNG: due to obstructive uropathy. Creatinine 4.4 on admission. Now improved to normal range.  - Continue to monitor.  - Continue to hold PTA Bumex and lisinopril    Hyperkalemia and hypercalcemia: resolved after IVF    HFpEF: no exacerbation. Metoprolol on hold for hypotension.    Chronic hypoxic respiratory failure: Continue home oxygen supplementation     History of PE: resumed Xarelto     NARCISA: CPAP at night    Bilateral chronic lymphedema: Lymphedema consulted    History of MS: has chronic left lower extremity weakness and pain. Supportive care    Morbid obesity: BMI 60.4       Diet: Advance Diet as Tolerated: Fully Advanced to diet(s) per Provider order; Regular Diet Adult    DVT Prophylaxis: DOAC  Brar Catheter: PRESENT, indication: Obstruction, Strict 1-2 Hour I&O  Central Lines: PRESENT  PICC Triple Lumen 02/21/22 Right Brachial vein medial Vasporessors-Site Assessment: WDL  Cardiac Monitoring: ACTIVE order. Indication: ICU  Code Status: Full Code      Disposition Plan   Expected Discharge: 02/24/2022     Anticipated  discharge location:  Awaiting care coordination huddle         The patient's care was discussed with the Bedside Nurse, Care Coordinator/ and Patient.    Laxmi Madison MD  Hospitalist Service  [facility]  Securely message with the Vocera Web Console (learn more here)  Text page via [facility] Paging/Directory       ______________________________________________________________________    Interval History   Patient's mom was by the bedside when patient was seen and examined today. Patient concerns her hematuria. She reports no back pain. She feels well in general.     Data reviewed today: I reviewed all medications, new labs and imaging results over the last 24 hours.     Physical Exam   Vital Signs: Temp: 98.2  F (36.8  C) Temp src: Oral BP: 127/67 Pulse: 92   Resp: 16 SpO2: (!) 89 % O2 Device: BiPAP/CPAP Oxygen Delivery: 2 LPM  Weight: 347 lbs 11.2 oz    General appearance: not in acute distress  HEENT: PERRL, EOMI  Lungs: Clear breath sounds in bilateral lung fields  Cardiovascular: Regular rate and rhythm, normal S1-S2  Abdomen: Soft, non tender, no distension, normal bowel sound  Musculoskeletal: No joint swelling  Skin: Chronic bilateral lower leg lymphedema  Neurology: AAO ×3.  Cranial nerves II - XII normal.  Grossly normal muscle strength in all four extremities.    Data   Recent Labs   Lab 02/23/22  0530 02/22/22  0544 02/21/22  0626 02/20/22 2122   WBC 5.8 9.2  --  14.1*   HGB 12.7 14.2  --  15.5   MCV 91 89  --  89    264  --  322    137 136 134*   POTASSIUM 4.5 4.7 3.4* 5.6*   CHLORIDE 107 104 106 92*   CO2 24 23 17* 21*   BUN 27* 33* 41* 47*   CR 0.78 1.29* 2.76* 4.46*   ANIONGAP 9 10 13 21*   EVITA 9.3 9.2 7.3* 11.0*   GLC 91 119 108 97   ALBUMIN  --   --   --  3.7   PROTTOTAL  --   --   --  8.1*   BILITOTAL  --   --   --  1.8*   ALKPHOS  --   --   --  66   ALT  --   --   --  19   AST  --   --   --  31

## 2022-02-23 NOTE — PLAN OF CARE
Problem: Plan of Care - These are the overarching goals to be used throughout the patient stay.    Goal: Absence of Hospital-Acquired Illness or Injury  Intervention: Prevent Skin Injury  Recent Flowsheet Documentation  Taken 2/23/2022 0400 by Vesta Rodriguez RN  Body Position: position changed independently  Taken 2/23/2022 0000 by Vesta Rodriguez RN  Body Position: position changed independently  Taken 2/22/2022 2000 by Vesta Rodriguez RN  Body Position: position changed independently     Problem: Renal Function Impairment (Acute Kidney Injury/Impairment)  Goal: Effective Renal Function  Intervention: Monitor and Support Renal Function  Recent Flowsheet Documentation  Taken 2/23/2022 0400 by Vesta Rodriguez RN  Medication Review/Management: medications reviewed  Taken 2/23/2022 0000 by Vesta Rodirguez RN  Medication Review/Management: medications reviewed  Taken 2/22/2022 2000 by Vesta Rodriguez RN  Medication Review/Management: medications reviewed   Goal Outcome Evaluation:    Plan of Care Reviewed With: patient     Pt resting off pressors all night.  Tolerating Cpap.  Brar putting out bloody urine.  Will update urology in a.m.  Will continue to monitor

## 2022-02-24 ENCOUNTER — APPOINTMENT (OUTPATIENT)
Dept: PHYSICAL THERAPY | Facility: HOSPITAL | Age: 47
DRG: 853 | End: 2022-02-24
Payer: COMMERCIAL

## 2022-02-24 ENCOUNTER — APPOINTMENT (OUTPATIENT)
Dept: OCCUPATIONAL THERAPY | Facility: HOSPITAL | Age: 47
DRG: 853 | End: 2022-02-24
Payer: COMMERCIAL

## 2022-02-24 ENCOUNTER — APPOINTMENT (OUTPATIENT)
Dept: OCCUPATIONAL THERAPY | Facility: HOSPITAL | Age: 47
DRG: 853 | End: 2022-02-24
Attending: INTERNAL MEDICINE
Payer: COMMERCIAL

## 2022-02-24 PROBLEM — Z86.711 HISTORY OF PULMONARY EMBOLISM: Status: ACTIVE | Noted: 2022-02-24

## 2022-02-24 LAB
ANION GAP SERPL CALCULATED.3IONS-SCNC: 8 MMOL/L (ref 5–18)
BUN SERPL-MCNC: 18 MG/DL (ref 8–22)
CALCIUM SERPL-MCNC: 9.5 MG/DL (ref 8.5–10.5)
CHLORIDE BLD-SCNC: 105 MMOL/L (ref 98–107)
CO2 SERPL-SCNC: 25 MMOL/L (ref 22–31)
CREAT SERPL-MCNC: 0.7 MG/DL (ref 0.6–1.1)
ERYTHROCYTE [DISTWIDTH] IN BLOOD BY AUTOMATED COUNT: 15.3 % (ref 10–15)
GFR SERPL CREATININE-BSD FRML MDRD: >90 ML/MIN/1.73M2
GLUCOSE BLD-MCNC: 97 MG/DL (ref 70–125)
HCT VFR BLD AUTO: 42.4 % (ref 35–47)
HGB BLD-MCNC: 12.7 G/DL (ref 11.7–15.7)
MCH RBC QN AUTO: 27 PG (ref 26.5–33)
MCHC RBC AUTO-ENTMCNC: 30 G/DL (ref 31.5–36.5)
MCV RBC AUTO: 90 FL (ref 78–100)
PLATELET # BLD AUTO: 193 10E3/UL (ref 150–450)
POTASSIUM BLD-SCNC: 4.4 MMOL/L (ref 3.5–5)
RBC # BLD AUTO: 4.71 10E6/UL (ref 3.8–5.2)
SODIUM SERPL-SCNC: 138 MMOL/L (ref 136–145)
WBC # BLD AUTO: 5.7 10E3/UL (ref 4–11)

## 2022-02-24 PROCEDURE — 250N000011 HC RX IP 250 OP 636: Performed by: INTERNAL MEDICINE

## 2022-02-24 PROCEDURE — 97530 THERAPEUTIC ACTIVITIES: CPT | Mod: GP

## 2022-02-24 PROCEDURE — 97116 GAIT TRAINING THERAPY: CPT | Mod: GP

## 2022-02-24 PROCEDURE — 120N000004 HC R&B MS OVERFLOW

## 2022-02-24 PROCEDURE — 97530 THERAPEUTIC ACTIVITIES: CPT | Mod: GO

## 2022-02-24 PROCEDURE — 80048 BASIC METABOLIC PNL TOTAL CA: CPT | Performed by: INTERNAL MEDICINE

## 2022-02-24 PROCEDURE — 250N000013 HC RX MED GY IP 250 OP 250 PS 637: Performed by: INTERNAL MEDICINE

## 2022-02-24 PROCEDURE — 94660 CPAP INITIATION&MGMT: CPT

## 2022-02-24 PROCEDURE — 97535 SELF CARE MNGMENT TRAINING: CPT | Mod: GO

## 2022-02-24 PROCEDURE — 85014 HEMATOCRIT: CPT | Performed by: INTERNAL MEDICINE

## 2022-02-24 PROCEDURE — 99233 SBSQ HOSP IP/OBS HIGH 50: CPT | Performed by: INTERNAL MEDICINE

## 2022-02-24 PROCEDURE — 999N000157 HC STATISTIC RCP TIME EA 10 MIN

## 2022-02-24 PROCEDURE — 99232 SBSQ HOSP IP/OBS MODERATE 35: CPT | Mod: GC | Performed by: INTERNAL MEDICINE

## 2022-02-24 RX ADMIN — GABAPENTIN 200 MG: 100 CAPSULE ORAL at 09:51

## 2022-02-24 RX ADMIN — GABAPENTIN 200 MG: 100 CAPSULE ORAL at 20:18

## 2022-02-24 RX ADMIN — PIPERACILLIN SODIUM AND TAZOBACTAM SODIUM 3.38 G: 3; .375 INJECTION, POWDER, LYOPHILIZED, FOR SOLUTION INTRAVENOUS at 01:08

## 2022-02-24 RX ADMIN — RIVAROXABAN 15 MG: 15 TABLET, FILM COATED ORAL at 09:53

## 2022-02-24 RX ADMIN — MICONAZOLE NITRATE: 20 POWDER TOPICAL at 20:18

## 2022-02-24 RX ADMIN — PIPERACILLIN SODIUM AND TAZOBACTAM SODIUM 3.38 G: 3; .375 INJECTION, POWDER, LYOPHILIZED, FOR SOLUTION INTRAVENOUS at 09:53

## 2022-02-24 RX ADMIN — GABAPENTIN 200 MG: 100 CAPSULE ORAL at 13:24

## 2022-02-24 RX ADMIN — MICONAZOLE NITRATE: 20 POWDER TOPICAL at 09:52

## 2022-02-24 RX ADMIN — ACETAMINOPHEN 975 MG: 325 TABLET ORAL at 04:15

## 2022-02-24 RX ADMIN — WHITE PETROLATUM 1 G: 1.75 OINTMENT TOPICAL at 09:52

## 2022-02-24 RX ADMIN — ACETAMINOPHEN 975 MG: 325 TABLET ORAL at 11:29

## 2022-02-24 RX ADMIN — SERTRALINE HYDROCHLORIDE 100 MG: 100 TABLET ORAL at 09:54

## 2022-02-24 RX ADMIN — PIPERACILLIN SODIUM AND TAZOBACTAM SODIUM 3.38 G: 3; .375 INJECTION, POWDER, LYOPHILIZED, FOR SOLUTION INTRAVENOUS at 17:22

## 2022-02-24 ASSESSMENT — ACTIVITIES OF DAILY LIVING (ADL)
ADLS_ACUITY_SCORE: 15

## 2022-02-24 NOTE — PROGRESS NOTES
RENAL PROGRESS NOTE      ASSESSMENT & PLAN:   This is a 46 year old female with past medical history significant for morbid obesity,  congestive heart failure, chronic lymphedema of lower extremities, hypertension, PE on chronic articulation of Xarelto, obstructive apnea on CPAP at night and multiple sclerosis who was recently 02/08-02/18/2022 admitted for COVID-19 infection, acute hypoxic respiratory failure and congestive heart failure exacerbation. Patient presented for further evaluation of inability to urinate.  Patient was found to have sepsis and acute kidney injury.  Nephrology service consulted for acute kidney injury.    Acute renal failure-likely multifactorial secondary to prerenal etiology from septic shock in settings of diuretics and ACE inhibitor's use and obstructive uropathy.  Patient baseline serum creatinine 0.8 mg/dL.  Patient presents with creatinine of 4.46 mg/dL.    Resolved. Serum creatinine is trending down to 0.70 mg/dL this am.  Urine output in nonoliguric range w/o diuretics.  S/p cystoscopy with a right ureteral stent placement 02/21  Patient is history of intermittent microscopic hematuria he is going back to June 2011. Likely secondary to nephrolithiasis vs menorrhagia.  Urinalysis on February 80,022 showed 22 red blood cells per hpf, no proteinuria.  Urinalysis in March 2021 showed 120 g/g of protein and 22 red blood cells per hpf.  UA 02/21 showed trace proteinuria and no hematuria. UPCR is 0.3 g/g.  Continue to hold prior to admission Bumex and lisinopril  Monitor renal function and urine output daily  Avoid nephrotoxins and renally dose medications.     Obstructive uropathy-CT abdomen pelvis showed 8 x 7 by extremity of proximal right ureteral stone with mild hydronephrosis.  S/p cystoscopy with a right ureteral stent placement 02/21.Having gross Hematuria. Hgb is wnl but trending down.  Urology is following, input appreciated     Hyperkalemia-  Likely secondary acute kidney  injury and metabolic acidosis.  Status post Kayexalate. Resolved.     Metabolic acidosis-likely secondary to HUNG.  Resolved. Serum bicarbonate is 25 this morning.     Septic shock-etiology unclear.  Chest x-ray showed no evidence of pulmonary infiltrates. Patient was recently treated for lower extremity cellulitis and discharged on cefuroxime.  Blood and urine cultures are negative to date. Remains off Levophed drip since 02/22. On broad-spectrum IV antibiotics.     Continue to hold prior to admission lisinopril and Bumex  Management as per primary team     Volume status-hard to assess given patient body habitus.  Patient with history of diastolic just of heart failure.  Patient reports improved lower extremity edema last 4 days.  Prior admission was taken 2 mg of Bumex twice a day. Net 2.0L negative since admission. Wt is not checked in last 2 days. Continue to hold prior to admission diuretics given acute kidney injury.  Monitor ins and outs  -Daily weight  -Low-sodium diet     History of hypertension-prior to admission patient was taken  Lisinopril 10 mg daily, metoprolol succinate 25 mg daily and Bumex 2 mg BID. PTA antihypertensive medications on hold. BP in normotensive range this am.     Hypercalcemia-mild.  Patient had a serum calcium of 11 on admission.  Resolved with hydration.      History of hypomagnesemia-prior to mission was taken magnesium oxide 400 g twice daily.  Mg level was wnl at 2.4 on 02/0.  Mg supplements oh hold given HUNG.       History of pulmonary embolism-PTA on chronic anticoagulation with Xarelto. Currently on hold due to gross hematuria.     History of sleep apnea on CPAP at night     Jodee Clark MD  [facility]  [address] [place] [address], [address]  [place] [address]  Phone# [phone]  Fax# [phone]     SUBJECTIVE:   No acute issues overnight.  Patient states that she is feeling better, reports stable lower extremity edema.  Patient denies: fever,  chills,  dizziness,  cough, shortness of breath , chest pain, palpitations, orthopnea, nausea, vomiting, abdominal pain.    OBJECTIVE:  Physical Exam   Temp: 98  F (36.7  C) Temp src: Oral BP: 121/79 Pulse: 82   Resp: 16 SpO2: 96 % O2 Device: BiPAP/CPAP Oxygen Delivery: 2 LPM  Vitals:    02/20/22 2056 02/22/22 0600   Weight: (!) 159.7 kg (352 lb) (!) 157.7 kg (347 lb 11.2 oz)     Vital Signs with Ranges  Temp:  [97.8  F (36.6  C)-98  F (36.7  C)] 98  F (36.7  C)  Pulse:  [77-93] 82  Resp:  [16-20] 16  BP: (119-129)/(73-79) 121/79  SpO2:  [90 %-97 %] 96 %  I/O last 3 completed shifts:  In: 431 [P.O.:220; I.V.:211]  Out: 1150 [Urine:1150]    @TMAXR(24)@    Patient Vitals for the past 72 hrs:   Weight   02/22/22 0600 (!) 157.7 kg (347 lb 11.2 oz)   [unfilled]    PHYSICAL EXAM:  Alert/oriented x 3; awake and NAD, super morbidly obese  HEENT NC/AT; perrla; OP clear without lesions; mmm  Neck supple without LAD, TM  CV; RRR without rub or murmur  Lung: clear and equal; no extra sounds  Ab: Large abdominal pannus, soft and NT; not distended; normal bs  : Brar catheter in place, making cherry color urine.  Ext:Le wrapped, improved ++ LE edema and well perfused  Skin; erythema of anterior shins of is a cobblestone appearance consistent with stasis dermatitis  Neuro; grossly intact    LABORATORY STUDIES:     Recent Labs   Lab 02/24/22  0612 02/23/22  0530 02/22/22  0544 02/20/22 2122   WBC 5.7 5.8 9.2 14.1*   RBC 4.71 4.70 5.20 5.77*   HGB 12.7 12.7 14.2 15.5   HCT 42.4 42.7 46.3 51.4*    202 264 322       Basic Metabolic Panel:  Recent Labs   Lab 02/24/22  0612 02/23/22  0530 02/22/22  0544 02/21/22  0626 02/20/22 2122 02/18/22  0555    140 137 136 134*  --    POTASSIUM 4.4 4.5 4.7 3.4* 5.6* 4.2   CHLORIDE 105 107 104 106 92*  --    CO2 25 24 23 17* 21*  --    BUN 18 27* 33* 41* 47*  --    CR 0.70 0.78 1.29* 2.76* 4.46*  --    GLC 97 91 119 108 97  --    EVITA 9.5 9.3 9.2 7.3* 11.0*  --        INRNo lab  results found in last 7 days.     Recent Labs   Lab Test 02/24/22  0612 02/23/22  0530 02/14/22  0510 02/11/22  0502   INR  --   --   --  1.23*   WBC 5.7 5.8   < > 5.9   HGB 12.7 12.7   < > 12.6    202   < > 219    < > = values in this interval not displayed.       Personally reviewed current labs     ~ 35 minutes spent in exam, POC, education regarding renal disease and management.  >90% time spent in education and counseling and/or discussion with patient's care team    Jodee Clark MD  [facility]  [address], [address]  [place] [address]  Phone# [phone]  Fax# [phone]

## 2022-02-24 NOTE — PLAN OF CARE
Problem: Plan of Care - These are the overarching goals to be used throughout the patient stay.    Goal: Plan of Care Review/Shift Note  Description: The Plan of Care Review/Shift note should be completed every shift.  The Outcome Evaluation is a brief statement about your assessment that the patient is improving, declining, or no change.  This information will be displayed automatically on your shift note.  Outcome: Ongoing, Progressing  Flowsheets (Taken 2/24/2022 1204 by Anne Lopez RN)  Plan of Care Reviewed With: patient  Goal: Absence of Hospital-Acquired Illness or Injury  Intervention: Prevent Skin Injury  Recent Flowsheet Documentation  Taken 2/24/2022 1600 by Danna Modi RN  Body Position: position changed independently  Taken 2/24/2022 1200 by Danna Modi RN  Body Position: position changed independently  Taken 2/24/2022 0800 by Danna Modi RN  Body Position: position changed independently  Intervention: Prevent and Manage VTE (Venous Thromboembolism) Risk  Recent Flowsheet Documentation  Taken 2/24/2022 1600 by Danna Modi RN  Activity Management: activity adjusted per tolerance  Taken 2/24/2022 1200 by Danna Modi RN  Activity Management: activity adjusted per tolerance  Taken 2/24/2022 1100 by Danna Modi RN  Activity Management:   standing at bedside   stepped/marched in place  Taken 2/24/2022 1000 by Danna Modi RN  Activity Management:   stepped/marched in place   standing at bedside  Taken 2/24/2022 0800 by Danna Modi RN  Activity Management: activity adjusted per tolerance   Goal Outcome Evaluation:    Plan of Care Reviewed With: patient          Outcome Evaluation: Patient's goal is a home discharge with resumed home care:  PT, OT, RN and home health aide.

## 2022-02-24 NOTE — PLAN OF CARE
Problem: Plan of Care - These are the overarching goals to be used throughout the patient stay.    Goal: Plan of Care Review/Shift Note  Outcome: Ongoing, Progressing     Patient a/o x 4 with no complaints of pain throughout the shift. Patient switched from oxymask to CPAP around midnight. Patient's urinary catheter irrigated x 2 this shift d/t dark red output; no clots noted. IV Zosyn administered per provider orders at 0100. Will continue monitoring.

## 2022-02-24 NOTE — PROGRESS NOTES
Place of Service:  [facility]     Reason for follow up: suspected infected stone, POD #3 s/p Cystoscopy with right retrograde pyelogram and right ureteral stent placement    SUBJECTIVE:  Events: no acute events overnight    Patient reports tolerating stent need to void despite Jiang catheter being in place. No fevers, nausea or vomiting. She does have increased hematuria today .    OBJECTIVE:  PHYSICAL EXAM:  Temp: 97.8  F (36.6  C) Temp src: Oral BP: 122/78 Pulse: 93   Resp: 20 SpO2: 97 % O2 Device: BiPAP/CPAP Oxygen Delivery: 2 LPM  General: NAD, alert, cooperative, alert and smiling.   Head: normocephalic, without abnormality / atraumatic  Abdomen: soft, non tender, non distended. no suprapubic fullness, no suprapubic tenderness. no CVA tenderness,   Genitourinary:jiang catheter in place with translucent cherry colored urine intubing and bag  Manually irrigated bladder via jiang catheter 30 mL sterile normal saline x 3, no return of clots, urine went from translucent cherry colored to clear  Skin: No rashes or lesions  Musculoskeletal: moves all four extremities equally; no calf edema or tenderness  Psychological: alert and oriented, answers questions appropriately    LABS:  Creatinine   Date Value Ref Range Status   02/24/2022 0.70 0.60 - 1.10 mg/dL Final   03/07/2021 0.62 0.52 - 1.04 mg/dL Final     WBC   Date Value Ref Range Status   03/07/2021 7.3 4.0 - 11.0 10e9/L Final     WBC Count   Date Value Ref Range Status   02/24/2022 5.7 4.0 - 11.0 10e3/uL Final     Hemoglobin   Date Value Ref Range Status   02/24/2022 12.7 11.7 - 15.7 g/dL Final   03/07/2021 12.5 11.7 - 15.7 g/dL Final     Platelet Count   Date Value Ref Range Status   02/24/2022 193 150 - 450 10e3/uL Final   03/08/2021 223 150 - 450 10e9/L Final     Creatinine   Date Value Ref Range Status   02/24/2022 0.70 0.60 - 1.10 mg/dL Final   03/07/2021 0.62 0.52 - 1.04 mg/dL Final       UA:  UA RESULTS:  Recent Labs   Lab Test 02/21/22  1230  02/24/20  1314 02/13/20  1647   COLOR Colorless   < > Yellow   APPEARANCE Clear   < > Cloudy   URINEGLC Negative   < > Negative   URINEBILI Negative   < > Negative   URINEKETONE Negative   < > Negative   SG 1.010   < > 1.020   UBLD 0.2 mg/dL*   < > Negative   URINEPH 5.5   < > 7.0   PROTEIN 10 *   < > Trace*   UROBILINOGEN  --   --  2.0*   NITRITE Negative   < > Negative   LEUKEST Negative   < > Trace*   RBCU <1   < > 2-5*   WBCU 7*   < > 10-25*    < > = values in this interval not displayed.         Cultures:  Urine no growth to date    Blood no growth to date    Lab Results: personally reviewed.     ASSESSMENT/PLAN:  Bess Enamorado is being seen by [facility] for suspected infected stone, POD# 3 s/p Cystoscopy with right retrograde pyelogram and right ureteral stent placement    -Patient is some stent irritation, if no contraindication.  Consider 5 mg IR Ditropan 3 times daily as needed for bladder spasms.  Would recommend a PVR check if initiated medication to ensure no urinary retention once Jiang catheter removed  -Jiang catheter, from urologic standpoint can be discontinued with voiding trial once medicine feels appropriate  -Creatinine is normal 0.7  -Patient restarted Xeralto given history of P  -Hg stable 12.7, was 12.7 yesterday. Monitor  - Hematuria. Hematuria expected with recent stent however risk of increased bleeding given anticoagulation, may have to consider holding anticoagulation if worsens               -Manually irrigate, irrigate clear to 30 mL flushes.               -Recommend Manually irrigate jiang catheter with 30 ml saline and aspirate (may repeat up to 6 times) scheduled q4   hours and sooner if needed for deep red urine or large clots. Please notify [facility] with any questions/concerns: -[phone].  - Abnormal UA, urine culture no growth to date, blood cultures no growth to date.  currently on Zosyn. Do suspect infected stone.  Appreciate primary team to continue to manage  antibiotics will need extended course for suspected complicated UTI.  -Right ureteral stent placed 2/21, will be in place for at least 2 weeks, followed by definitive management with ureteroscopic stone extraction with laser lithotripsy. Office staff will contact patient to schedule procedure  -Urology will continue to follow      Flora Wei PA-C  [facility]   [phone]

## 2022-02-24 NOTE — PLAN OF CARE
Goal Outcome Evaluation:    Plan of Care Reviewed With: patient          Outcome Evaluation: Patient's goal is a home discharge with resumed home care:  PT, OT, RN and home health aide.

## 2022-02-24 NOTE — PROGRESS NOTES
"Care Management Follow Up    Length of Stay (days): 3    Expected Discharge Date: 02/24/2022 or 2/25/2022       Concerns to be Addressed:   Urology following due to suspected infected stone (post Cystoscopy with right retrograde pyelogram and right ureteral stent placement on 2/21/2022): cultures pending, IV Zosyn.   Patient plan of care discussed at interdisciplinary rounds: Yes    Anticipated Discharge Disposition:  Patient's goal is a home discharge with resumed home care.      Anticipated Discharge Services:  Home nursing (resumed) plus PT, OT and a home health aide. Therapy agrees.   Anticipated Discharge DME:  Per therapy (if indicated).    Patient/family educated on Medicare website which has current facility and service quality ratings:  NA  Education Provided on the Discharge Plan:  Per team.  Patient/Family in Agreement with the Plan:  Yes    Referrals Placed by CM/SW:  [facility];   Private pay costs discussed: Not applicable at this time.     Additional Information:  Met with patient on 2/22/2022 to confirm her goals for discharge. She plans to return home at discharge and anticipates family transport. Patient lives alone in her house and has RN/PT/OT/HHA with Replaced by [facility] (confirmed with intake). She has home oxygen from [facility] (which had been using only at night). Patient uses a walker to ambulate and does not drive. She also has PCA support (currently her sister is her PCA). She has a commode chair, rolling walker and lift recliner at home. She is looking in to getting a hospital bed. Per therapy note of 2/24, \"sats remained stable on 2L NC during activity.  If patient continues to progress in self care management and mobility, anticipate patient will be able to return home with home health care and family support.\"    Anne Lopez RN      "

## 2022-02-24 NOTE — PROGRESS NOTES
02/24/22 0900   Quick Adds   Type of Visit Initial Occupational Therapy Evaluation   Living Environment   People in Home alone   Current Living Arrangements other (see comments)  (one level [facility])   Living Environment Comments See PT eval.  Pt. states she will have daily checkins between PCA services and family support.    Self-Care   Usual Activity Tolerance good   Current Activity Tolerance fair   Activity/Exercise/Self-Care Comment See PT note   General Information   Onset of Illness/Injury or Date of Surgery 02/20/22   Referring Physician Destin Galicia MD   Patient/Family Therapy Goal Statement (OT) Return home    Existing Precautions/Restrictions fall   General Observations and Info Admit with HUNG, kidney stone obstruction.   PMH: Recent hospitalization: 2/8/22-2/18/22   Cognitive Status Examination   Orientation Status orientation to person, place and time   Follows Commands WNL   Pain Assessment   Patient Currently in Pain Yes, see Vital Sign flowsheet   Integumentary/Edema   Integumentary/Edema Comments BLE edema, pt. has lymphedema tx.    Posture   Posture not impaired   Range of Motion Comprehensive   General Range of Motion bilateral upper extremity ROM WFL   Strength Comprehensive (MMT)   Comment, General Manual Muscle Testing (MMT) Assessment Generalized weakness, L weaker than R due to MS   Coordination   Upper Extremity Coordination No deficits were identified   Bed Mobility   Bed Mobility rolling right;rolling left;supine-sit   Rolling Left Miller (Bed Mobility) verbal cues;supervision   Rolling Right Miller (Bed Mobility) verbal cues;supervision   Supine-Sit Miller (Bed Mobility) contact guard   Sit-Supine Miller (Bed Mobility) minimum assist (75% patient effort)   Assistive Device (Bed Mobility) bed rails   Comment (Bed Mobility) Assist to lift LLE into bed.  Pt. sleeps in recliner at home.    Transfers   Transfers sit-stand transfer   Sit-Stand Transfer   Sit-Stand  [place] (Transfers) contact guard   Assistive Device (Sit-Stand Transfers) walker, front-wheeled   Balance   Balance Comments Ind. sitting balance EOB, CGA standing static and dynamic balance.    Activities of Daily Living   BADL Assessment/Intervention lower body dressing   Lower Body Dressing Assessment/Training   [place] Level (Lower Body Dressing) moderate assist (50% patient effort)   Assistive Devices (Lower Body Dressing) reacher   Position (Lower Body Dressing) sitting up in bed   Comment, (Lower Body Dressing) Mod. assist to pullup underwear due to pt. unable to use typical technique with Rt. hand IV in place.  Pt. does not wear socks at home and prefers dresses without undergarments for toileting ease.    Clinical Impression   Criteria for Skilled Therapeutic Interventions Met (OT) Yes, treatment indicated   OT Diagnosis Impaired self cares and mobility   OT Problem List-Impairments impacting ADL activity tolerance impaired;balance;flexibility;mobility;strength   Assessment of Occupational Performance 3-5 Performance Deficits   Identified Performance Deficits dressing, toileting, transfers    Planned Therapy Interventions (OT) ADL retraining;balance training;bed mobility training;strengthening;transfer training;progressive activity/exercise   Clinical Decision Making Complexity (OT) moderate complexity   Predicted Duration of Therapy 7 days   Anticipated Equipment Needs Upon Discharge (OT)   (Pt. has all necessary equipment)   Risk & Benefits of therapy have been explained evaluation/treatment results reviewed;care plan/treatment goals reviewed;participants voiced agreement with care plan;participants included;patient   OT Discharge Planning   OT Discharge Recommendation (DC Rec) home with assist;home with home care occupational therapy   OT Rationale for DC Rec Pt.'s sats remained stable on 2L NC during activity.  If patient continues to progress in self care mgmt and mobility anticipate pt.  will be able to return home with  care and family support.    OT Goals   Therapy Frequency (OT) Daily   OT Predicated Duration/Target Date for Goal Attainment 03/02/22   OT Goals Lower Body Dressing;Transfers;Toilet Transfer/Toileting   OT: Lower Body Dressing Supervision/stand-by assist;using adaptive equipment  (except donning socks)   OT: Transfer Supervision/stand-by assist   OT: Toilet Transfer/Toileting Supervision/stand-by assist;toilet transfer;cleaning and garment management

## 2022-02-24 NOTE — PROGRESS NOTES
[facility]    Medicine Progress Note - Hospitalist Service    Date of Admission:  2/20/2022    Assessment & Plan          46-year-old female recently discharged after admitted for Covid19, acute hypoxic respiratory failure and CHF exacerbation from 2/8-2/18/2022 presents with decreased urine output and found to have HUNG and hypotensive.    UTI, Obstructing kidney stone: CT abdomen showing 8 x 7 x 3 mm stone proximal right ureter causing mild hydronephrosis. s/p Cystoscopy with right retrograde pyelogram and right ureteral stent placement. Developed hematuria after resuming Xarelto on 2/22.  - Continue Zosyn. Plan to transition to Cefuroxime when appropriate  - Follow up urine culture sent on 2/21: no growth  - Follow up blood culture sent on 2/20: no growth so far  - Per Urology, manual bladder irrigation for hematuria. Will continue today since patient continues to have significant hematuria.   - Monitor hemoglobin: has been stable.    Hypotension: due to hypovolemia. Needed Levophed previously and now tapered off.   - Blood pressure has been stable. Continue to monitor .    HUNG: due to obstructive uropathy. Creatinine 4.4 on admission. Now improved to normal range.  - Continue to monitor.  - Continue to hold PTA Bumex and lisinopril    Hyperkalemia and hypercalcemia: resolved after IVF    HFpEF: no exacerbation. Metoprolol on hold for hypotension.    Chronic hypoxic respiratory failure: Continue home oxygen supplementation     History of PE: Hold Xarelto due to persistent hematuria.     NARCISA: CPAP at night    Bilateral chronic lymphedema: Lymphedema consulted    History of MS: has chronic left lower extremity weakness and pain. Supportive care    Morbid obesity: BMI 60.4       Diet: Advance Diet as Tolerated: Fully Advanced to diet(s) per Provider order; Regular Diet Adult    DVT Prophylaxis: DOAC  Brar Catheter: PRESENT, indication: Obstruction, Strict 1-2 Hour I&O  Central Lines:  PRESENT  PICC Triple Lumen 02/21/22 Right Brachial vein medial Vasporessors-Site Assessment: WDL  Cardiac Monitoring: ACTIVE order. Indication: ICU  Code Status: Full Code      Disposition Plan   Expected Discharge: 02/25/2022     Anticipated discharge location:  Awaiting care coordination huddle         The patient's care was discussed with the Bedside Nurse, Care Coordinator/ and Patient.    Laxmi Madison MD  Hospitalist [facility]  Securely message with the Vocera Web Console (learn more here)  Text page via [employer] Paging/Directory       ______________________________________________________________________    Interval History   Patient reports feeling well overall. No back pain. Her hematuria gets lighter.     Data reviewed today: I reviewed all medications, new labs and imaging results over the last 24 hours.     Physical Exam   Vital Signs: Temp: 97.8  F (36.6  C) Temp src: Oral BP: 122/78 Pulse: 93   Resp: 20 SpO2: 97 % O2 Device: BiPAP/CPAP Oxygen Delivery: 2 LPM  Weight: 347 lbs 11.2 oz    General appearance: not in acute distress  HEENT: PERRL, EOMI  Lungs: Clear breath sounds in bilateral lung fields  Cardiovascular: Regular rate and rhythm, normal S1-S2  Abdomen: Soft, non tender, no distension, normal bowel sound  Musculoskeletal: No joint swelling  Skin: Chronic bilateral lower leg lymphedema  Neurology: AAO ×3.  Cranial nerves II - XII normal.  Grossly normal muscle strength in all four extremities.    Data   Recent Labs   Lab 02/24/22  0612 02/23/22  0530 02/22/22  0544 02/21/22  0626 02/20/22 2122   WBC 5.7 5.8 9.2  --  14.1*   HGB 12.7 12.7 14.2  --  15.5   MCV 90 91 89  --  89    202 264  --  322    140 137   < > 134*   POTASSIUM 4.4 4.5 4.7   < > 5.6*   CHLORIDE 105 107 104   < > 92*   CO2 25 24 23   < > 21*   BUN 18 27* 33*   < > 47*   CR 0.70 0.78 1.29*   < > 4.46*   ANIONGAP 8 9 10   < > 21*   EVITA 9.5 9.3 9.2   < > 11.0*   GLC 97 91  119   < > 97   ALBUMIN  --   --   --   --  3.7   PROTTOTAL  --   --   --   --  8.1*   BILITOTAL  --   --   --   --  1.8*   ALKPHOS  --   --   --   --  66   ALT  --   --   --   --  19   AST  --   --   --   --  31    < > = values in this interval not displayed.

## 2022-02-25 ENCOUNTER — APPOINTMENT (OUTPATIENT)
Dept: OCCUPATIONAL THERAPY | Facility: HOSPITAL | Age: 47
DRG: 853 | End: 2022-02-25
Payer: COMMERCIAL

## 2022-02-25 LAB
ERYTHROCYTE [DISTWIDTH] IN BLOOD BY AUTOMATED COUNT: 15 % (ref 10–15)
HCT VFR BLD AUTO: 42 % (ref 35–47)
HGB BLD-MCNC: 12.7 G/DL (ref 11.7–15.7)
HOLD SPECIMEN: NORMAL
MCH RBC QN AUTO: 27 PG (ref 26.5–33)
MCHC RBC AUTO-ENTMCNC: 30.2 G/DL (ref 31.5–36.5)
MCV RBC AUTO: 89 FL (ref 78–100)
PLATELET # BLD AUTO: 204 10E3/UL (ref 150–450)
RBC # BLD AUTO: 4.71 10E6/UL (ref 3.8–5.2)
WBC # BLD AUTO: 6.1 10E3/UL (ref 4–11)

## 2022-02-25 PROCEDURE — 97535 SELF CARE MNGMENT TRAINING: CPT | Mod: GO

## 2022-02-25 PROCEDURE — 99232 SBSQ HOSP IP/OBS MODERATE 35: CPT | Mod: GC | Performed by: INTERNAL MEDICINE

## 2022-02-25 PROCEDURE — 250N000013 HC RX MED GY IP 250 OP 250 PS 637: Performed by: INTERNAL MEDICINE

## 2022-02-25 PROCEDURE — 999N000157 HC STATISTIC RCP TIME EA 10 MIN

## 2022-02-25 PROCEDURE — 250N000011 HC RX IP 250 OP 636: Performed by: INTERNAL MEDICINE

## 2022-02-25 PROCEDURE — 99233 SBSQ HOSP IP/OBS HIGH 50: CPT | Performed by: INTERNAL MEDICINE

## 2022-02-25 PROCEDURE — 85027 COMPLETE CBC AUTOMATED: CPT | Performed by: INTERNAL MEDICINE

## 2022-02-25 PROCEDURE — 120N000001 HC R&B MED SURG/OB

## 2022-02-25 PROCEDURE — 94660 CPAP INITIATION&MGMT: CPT

## 2022-02-25 RX ORDER — METOPROLOL SUCCINATE 25 MG/1
25 TABLET, EXTENDED RELEASE ORAL DAILY
Status: DISCONTINUED | OUTPATIENT
Start: 2022-02-25 | End: 2022-02-27 | Stop reason: HOSPADM

## 2022-02-25 RX ORDER — BUMETANIDE 1 MG/1
1 TABLET ORAL DAILY
Status: DISCONTINUED | OUTPATIENT
Start: 2022-02-25 | End: 2022-02-27 | Stop reason: HOSPADM

## 2022-02-25 RX ORDER — MAGNESIUM OXIDE 400 MG/1
400 TABLET ORAL 2 TIMES DAILY
Status: DISCONTINUED | OUTPATIENT
Start: 2022-02-25 | End: 2022-02-27 | Stop reason: HOSPADM

## 2022-02-25 RX ADMIN — WHITE PETROLATUM: 1.75 OINTMENT TOPICAL at 08:58

## 2022-02-25 RX ADMIN — BUMETANIDE 1 MG: 1 TABLET ORAL at 14:26

## 2022-02-25 RX ADMIN — PIPERACILLIN SODIUM AND TAZOBACTAM SODIUM 3.38 G: 3; .375 INJECTION, POWDER, LYOPHILIZED, FOR SOLUTION INTRAVENOUS at 18:59

## 2022-02-25 RX ADMIN — MAGNESIUM OXIDE TAB 400 MG (241.3 MG ELEMENTAL MG) 400 MG: 400 (241.3 MG) TAB at 20:22

## 2022-02-25 RX ADMIN — SERTRALINE HYDROCHLORIDE 100 MG: 100 TABLET ORAL at 08:57

## 2022-02-25 RX ADMIN — RIVAROXABAN 15 MG: 15 TABLET, FILM COATED ORAL at 18:59

## 2022-02-25 RX ADMIN — MICONAZOLE NITRATE: 20 POWDER TOPICAL at 19:05

## 2022-02-25 RX ADMIN — GABAPENTIN 200 MG: 100 CAPSULE ORAL at 14:26

## 2022-02-25 RX ADMIN — GABAPENTIN 200 MG: 100 CAPSULE ORAL at 20:22

## 2022-02-25 RX ADMIN — PIPERACILLIN SODIUM AND TAZOBACTAM SODIUM 3.38 G: 3; .375 INJECTION, POWDER, LYOPHILIZED, FOR SOLUTION INTRAVENOUS at 08:58

## 2022-02-25 RX ADMIN — MICONAZOLE NITRATE 1 APPLICATOR: 20 POWDER TOPICAL at 08:57

## 2022-02-25 RX ADMIN — METOPROLOL SUCCINATE 25 MG: 25 TABLET, EXTENDED RELEASE ORAL at 14:26

## 2022-02-25 RX ADMIN — PIPERACILLIN SODIUM AND TAZOBACTAM SODIUM 3.38 G: 3; .375 INJECTION, POWDER, LYOPHILIZED, FOR SOLUTION INTRAVENOUS at 03:01

## 2022-02-25 RX ADMIN — GABAPENTIN 200 MG: 100 CAPSULE ORAL at 08:56

## 2022-02-25 ASSESSMENT — ACTIVITIES OF DAILY LIVING (ADL)
ADLS_ACUITY_SCORE: 15

## 2022-02-25 NOTE — PROGRESS NOTES
[facility]    Medicine Progress Note - Hospitalist Service    Date of Admission:  2/20/2022    Assessment & Plan          46-year-old female recently discharged after admitted for Covid19, acute hypoxic respiratory failure and CHF exacerbation from 2/8-2/18/2022 presents with decreased urine output and found to have HUNG and hypotensive.    UTI, Obstructing kidney stone: CT abdomen showing 8 x 7 x 3 mm stone proximal right ureter causing mild hydronephrosis. s/p Cystoscopy with right retrograde pyelogram and right ureteral stent placement. Developed hematuria after resuming Xarelto on 2/22.  - Continue Zosyn. Plan to transition to Cefuroxime when appropriate  - Follow up urine culture sent on 2/21: no growth  - Follow up blood culture sent on 2/20: no growth so far  - Hematuria now resolved. Discontinue Brar today. Voiding trial  - Per Urology, right ureteral stent will be in place for at least 2 weeks, followed by definitive management with ureteroscopic stone extraction with laser lithotripsy outpatient.     Hypotension: due to hypovolemia. Needed Levophed previously and now tapered off.   - Blood pressure has been stable. Continue to monitor .    HUNG: due to obstructive uropathy. Creatinine 4.4 on admission. Now improved to normal range.  - Continue to monitor.    Hyperkalemia and hypercalcemia: resolved after IVF    HFpEF: no exacerbation. Llisinopril on hold for hypotension. Resume bumex and metoprolol.    Chronic hypoxic respiratory failure: Continue home oxygen supplementation     History of PE: Resume Xarelto today.     NARCISA: CPAP at night    Bilateral chronic lymphedema: Lymphedema consulted    History of MS: has chronic left lower extremity weakness and pain. Supportive care    Morbid obesity: BMI 60.4       Diet: Regular Diet Adult  Fluid restriction 2000 ML FLUID    DVT Prophylaxis: DOAC  Brar Catheter: Not present  Central Lines: PRESENT  PICC Triple Lumen 02/21/22 Right  Brachial vein medial Vasporessors-Site Assessment: WDL  Cardiac Monitoring: None  Code Status: Full Code      Disposition Plan   Expected Discharge: 02/26/2022     Anticipated discharge location:  Awaiting care coordination huddle         The patient's care was discussed with the Bedside Nurse, Care Coordinator/ and Patient.    Laxmi Madison MD  Hospitalist Service  [facility]  Securely message with the Vocera Web Console (learn more here)  Text page via [employer] Paging/Directory       ______________________________________________________________________    Interval History   Patient reports feeling well overall. No back pain. Her hematuria has now resolved.      Data reviewed today: I reviewed all medications, new labs and imaging results over the last 24 hours.     Physical Exam   Vital Signs: Temp: 98.1  F (36.7  C) Temp src: Oral BP: (!) 133/91 Pulse: 91   Resp: 16 SpO2: 93 % O2 Device: None (Room air) Oxygen Delivery: 2 LPM  Weight: 355 lbs 3.2 oz    General appearance: not in acute distress  HEENT: PERRL, EOMI  Lungs: Clear breath sounds in bilateral lung fields  Cardiovascular: Regular rate and rhythm, normal S1-S2  Abdomen: Soft, non tender, no distension, normal bowel sound  Musculoskeletal: No joint swelling  Skin: Chronic bilateral lower leg lymphedema  Neurology: AAO ×3.  Cranial nerves II - XII normal.  Grossly normal muscle strength in all four extremities.    Data   Recent Labs   Lab 02/25/22  0502 02/24/22  0612 02/23/22  0530 02/22/22  0544 02/21/22  0626 02/20/22  2122   WBC 6.1 5.7 5.8 9.2  --  14.1*   HGB 12.7 12.7 12.7 14.2  --  15.5   MCV 89 90 91 89  --  89    193 202 264  --  322   NA  --  138 140 137   < > 134*   POTASSIUM  --  4.4 4.5 4.7   < > 5.6*   CHLORIDE  --  105 107 104   < > 92*   CO2  --  25 24 23   < > 21*   BUN  --  18 27* 33*   < > 47*   CR  --  0.70 0.78 1.29*   < > 4.46*   ANIONGAP  --  8 9 10   < > 21*   EVITA  --  9.5 9.3 9.2   < >  11.0*   GLC  --  97 91 119   < > 97   ALBUMIN  --   --   --   --   --  3.7   PROTTOTAL  --   --   --   --   --  8.1*   BILITOTAL  --   --   --   --   --  1.8*   ALKPHOS  --   --   --   --   --  66   ALT  --   --   --   --   --  19   AST  --   --   --   --   --  31    < > = values in this interval not displayed.

## 2022-02-25 NOTE — PROGRESS NOTES
Place of Service:  [facility]     Reason for follow up: suspected infected stone, s/p Cystoscopy with right retrograde pyelogram and right ureteral stent placement    SUBJECTIVE:  Events: no acute events overnight    Patient reports tolerating stent and feels spasms have improved. Hematuria resolved. No fevers, nausea or vomiting. She moved to step down unit and has been able to get up and walk short distance      OBJECTIVE:  PHYSICAL EXAM:  Temp: 98  F (36.7  C) Temp src: Oral BP: (!) 131/90 Pulse: 86   Resp: 18 SpO2: 95 % O2 Device: None (Room air) Oxygen Delivery: 2 LPM  General: NAD, alert, cooperative, alert and smiling.   Head: normocephalic, without abnormality / atraumatic  Abdomen: soft, non tender, non distended. no suprapubic fullness, no suprapubic tenderness. no CVA tenderness,   Genitourinary:jiang catheter in place with yellow/light zarina colored urine intubing and bag  Skin: No rashes or lesions  Musculoskeletal: moves all four extremities equally; no calf edema or tenderness  Psychological: alert and oriented, answers questions appropriately    LABS:  Creatinine   Date Value Ref Range Status   02/24/2022 0.70 0.60 - 1.10 mg/dL Final   03/07/2021 0.62 0.52 - 1.04 mg/dL Final     WBC   Date Value Ref Range Status   03/07/2021 7.3 4.0 - 11.0 10e9/L Final     WBC Count   Date Value Ref Range Status   02/25/2022 6.1 4.0 - 11.0 10e3/uL Final     Hemoglobin   Date Value Ref Range Status   02/25/2022 12.7 11.7 - 15.7 g/dL Final   03/07/2021 12.5 11.7 - 15.7 g/dL Final     Platelet Count   Date Value Ref Range Status   02/25/2022 204 150 - 450 10e3/uL Final   03/08/2021 223 150 - 450 10e9/L Final     Creatinine   Date Value Ref Range Status   02/24/2022 0.70 0.60 - 1.10 mg/dL Final   03/07/2021 0.62 0.52 - 1.04 mg/dL Final       UA:  UA RESULTS:  Recent Labs   Lab Test 02/21/22  1237 02/24/20  1314 02/13/20  1647   COLOR Colorless   < > Yellow   APPEARANCE Clear   < > Cloudy   URINEGLC Negative   <  > Negative   URINEBILI Negative   < > Negative   URINEKETONE Negative   < > Negative   SG 1.010   < > 1.020   UBLD 0.2 mg/dL*   < > Negative   URINEPH 5.5   < > 7.0   PROTEIN 10 *   < > Trace*   UROBILINOGEN  --   --  2.0*   NITRITE Negative   < > Negative   LEUKEST Negative   < > Trace*   RBCU <1   < > 2-5*   WBCU 7*   < > 10-25*    < > = values in this interval not displayed.         Cultures:  Urine no growth to date    Blood no growth to date    Lab Results: personally reviewed.     ASSESSMENT/PLAN:  Bess Enamorado is being seen by [facility] for suspected infected stone, POD# 3 s/p Cystoscopy with right retrograde pyelogram and right ureteral stent placement    -Patient is some stent irritation, if no contraindication recommend 5 mg IR Ditropan 3 times daily as needed for bladder spasms.  Would recommend a PVR check if initiated medication to ensure no urinary retention once Jiang catheter removed  -Jiang catheter: from urologic standpoint can be discontinued with voiding trial. Discussed with patient will have voiding trial AM. Discontinuation jiang order placed.                   -Check PVRs. If < 200 ml x 2, discontinue PVR checks. If > 400 ml, straight cath. If requires > 2 straight catheterizations, place jiang catheter and notify [facility]  -Patient on Xeralto given history of PE  -Hg stable 12.7  - Hematuria resolved  - Abnormal UA, urine culture no growth to date, blood cultures no growth to date.  currently on Zosyn. Do suspect infected stone.  Appreciate primary team to continue to manage antibiotics will need extended 2 week course for suspected infected ureteral stone and complicated UTI.  -Right ureteral stent placed 2/21, will be in place for at least 2 weeks, followed by definitive management with ureteroscopic stone extraction with laser lithotripsy. Office staff will contact patient to schedule procedure  -Urology will continue to follow remotely       Flora Wei PA-C  [facility]    [phone]

## 2022-02-25 NOTE — PLAN OF CARE
Problem: Fluid and Electrolyte Imbalance (Acute Kidney Injury/Impairment)  Goal: Fluid and Electrolyte Balance  Outcome: Ongoing, Progressing     Problem: Skin or Soft Tissue Infection  Goal: Absence of Infection Signs and Symptoms  Intervention: Minimize and Manage Infection Progression  Recent Flowsheet Documentation  Taken 2/25/2022 0106 by Laya Nick RN  Infection Prevention: hand hygiene promoted  Taken 2/24/2022 2015 by Laya Nick RN  Infection Prevention: hand hygiene promoted     Problem: Renal Function Impairment (Acute Kidney Injury/Impairment)  Goal: Effective Renal Function  Intervention: Monitor and Support Renal Function  Recent Flowsheet Documentation  Taken 2/25/2022 0106 by Laya Nick RN  Medication Review/Management: medications reviewed  Taken 2/24/2022 2015 by Laya Nick RN  Medication Review/Management: medications reviewed   Goal Outcome Evaluation:    Plan of Care Reviewed With: patient     Overall Patient Progress: improving    Denied pain and discomfort, On IV ABX, jiang intact draining bloody color urine. Irrigated jiang twice as per order. Waiting for bed availability in med-surg unit,        Outcome Evaluation: Patient's goal is a home discharge with resumed home care:  PT, OT, RN and home health aide.

## 2022-02-25 NOTE — PROGRESS NOTES
Pt arrived via stretcher from ICU. Pt then walked from [facility] into chair that is in room. Pt was taken off O2, SATS low 90's. Pt was given room info and call light. Pt has bariatric bed and chair in room

## 2022-02-25 NOTE — PROGRESS NOTES
Care Management Follow Up    Length of Stay (days): 4    Expected Discharge Date: 02/25/2022 or 2/26/2022       Concerns to be Addressed:   Urology following due to suspected infected stone (post Cystoscopy with right retrograde pyelogram and right ureteral stent placement on 2/21/2022):  IV Zosyn.   Patient plan of care discussed at interdisciplinary rounds: Yes    Anticipated Discharge Disposition:  Patient's goal is a home discharge with resumed home care.      Anticipated Discharge Services:  Home nursing (resumed) plus PT, OT and a home health aide. Therapy agrees.   Anticipated Discharge DME:  Per therapy (if indicated).    Patient/family educated on Medicare website which has current facility and service quality ratings:  NA  Education Provided on the Discharge Plan:  Per team.  Patient/Family in Agreement with the Plan:  Yes    Referrals Placed by CM/SW:  [facility];   Private pay costs discussed: Not applicable at this time.     Additional Information:  Patient plans to return home at discharge and anticipates family will transport. Patient lives alone in her house and has RN/PT/OT/HHA with [facility] (confirmed with intake). She has home oxygen from [facility] (which had been using only at night). Patient uses a walker to ambulate and does not drive. She also has PCA support (currently, her sister is her PCA). She has a commode chair, rolling walker and lift recliner at home. She is looking in to getting a hospital bed. Therapy agrees with a home discharge.     Anne Lopez RN

## 2022-02-25 NOTE — PROGRESS NOTES
RENAL PROGRESS NOTE      ASSESSMENT & PLAN:   This is a 46 year old female with past medical history significant for morbid obesity,  congestive heart failure, chronic lymphedema of lower extremities, hypertension, PE on chronic articulation of Xarelto, obstructive apnea on CPAP at night and multiple sclerosis who was recently 02/08-02/18/2022 admitted for COVID-19 infection, acute hypoxic respiratory failure and congestive heart failure exacerbation. Patient presented for further evaluation of inability to urinate.  Patient was found to have sepsis and acute kidney injury.  Nephrology service consulted for acute kidney injury.    Acute renal failure-likely multifactorial secondary to prerenal etiology from septic shock in settings of diuretics and ACE inhibitor's use and obstructive uropathy.  Patient baseline serum creatinine 0.8 mg/dL.  Patient presents with creatinine of 4.46 mg/dL.    Resolved. Serum creatinine was trending down to 0.70 mg/dL 02/24  Urine output in nonoliguric range w/o diuretics.  S/p cystoscopy with a right ureteral stent placement 02/21  Patient is history of intermittent microscopic hematuria he is going back to June 2011. Likely secondary to nephrolithiasis vs menorrhagia.  Urinalysis on February 80,022 showed 22 red blood cells per hpf, no proteinuria.  Urinalysis in March 2021 showed 120 g/g of protein and 22 red blood cells per hpf.  UA 02/21 showed trace proteinuria and no hematuria. UPCR is 0.3 g/g.  Continue to hold prior to admission Bumex and lisinopril  Monitor renal function and urine output daily  Avoid nephrotoxins and renally dose medications.     Obstructive uropathy-CT abdomen pelvis showed 8 x 7 by extremity of proximal right ureteral stone with mild hydronephrosis.  S/p cystoscopy with a right ureteral stent placement 02/21.Having gross Hematuria. Hgb is wnl but trending down.  Urology is following, input appreciated     Hyperkalemia-  Likely secondary acute kidney  injury and metabolic acidosis.  Status post Kayexalate. Resolved.     Metabolic acidosis-likely secondary to HUNG.  Resolved.      Septic shock-etiology unclear.  Chest x-ray showed no evidence of pulmonary infiltrates. Patient was recently treated for lower extremity cellulitis and discharged on cefuroxime.  Blood and urine cultures are negative to date. Remains off Levophed drip since 02/22. On broad-spectrum IV antibiotics.     Continue to hold prior to admission lisinopril and Bumex  Management as per primary team     Volume status-hard to assess given patient body habitus.  Patient with history of diastolic just of heart failure.  Patient reports improved lower extremity edema last 4 days.  Prior admission was taken 2 mg of Bumex twice a day. Net 3.0L negative since admission but weight is trending up. Patient admits incerased inner thigh edema.  -restart Bumex at 1 mg daily  -Monitor ins and outs  -Daily weight  -Low-sodium diet  -fluid restriction 2L.     History of hypertension-prior to admission patient was taken  Lisinopril 10 mg daily, metoprolol succinate 25 mg daily and Bumex 2 mg BID. PTA antihypertensive medications currently on hold. BP is 131/90 this afternoon.  -restart Toprol XL at 25 mg daily  -continue to hold Losartan for now     Hypercalcemia-mild.  Patient had a serum calcium of 11 on admission.  Resolved with hydration.      History of hypomagnesemia-prior to mission was taken magnesium oxide 400 g twice daily.  Mg level was wnl at 2.4 on 02/0.  Mg supplements were on hold given HUNG.  -resume Mg supplements today       History of pulmonary embolism-PTA on chronic anticoagulation with Xarelto. Currently on hold due to gross hematuria.     History of sleep apnea on CPAP at night     Jodee Clark MD  [facility]  [address], [address]  [place] [address]  Phone# [phone]  Fax# [phone]     SUBJECTIVE:   No acute issues overnight.  Patient states  that she is feeling much better.  Patient stated that she walked with the physical therapist in the hallway.  Patient reports good appetite.  Reports increased edema of inner thighs  Patient denies: fever, chills,  dizziness,  cough, shortness of breath , chest pain, palpitations, orthopnea, nausea, vomiting, abdominal pain.    OBJECTIVE:  Physical Exam   Temp: 98  F (36.7  C) Temp src: Oral BP: (!) 131/90 Pulse: 86   Resp: 18 SpO2: 95 % O2 Device: None (Room air) Oxygen Delivery: 2 LPM  Vitals:    02/20/22 2056 02/22/22 0600   Weight: (!) 159.7 kg (352 lb) (!) 157.7 kg (347 lb 11.2 oz)     Vital Signs with Ranges  Temp:  [98  F (36.7  C)-98.2  F (36.8  C)] 98  F (36.7  C)  Pulse:  [77-97] 86  Resp:  [16-18] 18  BP: (121-131)/(72-90) 131/90  SpO2:  [93 %-97 %] 95 %  I/O last 3 completed shifts:  In: 250 [P.O.:220; I.V.:30]  Out: 1275 [Urine:1275]    @TMAXR(24)@    No data found.[unfilled]    PHYSICAL EXAM:  Alert/oriented x 3; awake and NAD, super morbidly obese  HEENT NC/AT; perrla; OP clear without lesions; mmm  Neck supple without LAD, TM  CV; RRR without rub or murmur  Lung: clear and equal; no extra sounds  Ab: Large abdominal pannus, soft and NT; not distended; normal bs  : Brar catheter in place, making zarina color urine.  Ext:LE wrapped, stable LE edema and well perfused  Skin; erythema of anterior shins of is a cobblestone appearance consistent with stasis dermatitis  Neuro; grossly intact    LABORATORY STUDIES:     Recent Labs   Lab 02/25/22  0502 02/24/22  0612 02/23/22  0530 02/22/22  0544 02/20/22  2122   WBC 6.1 5.7 5.8 9.2 14.1*   RBC 4.71 4.71 4.70 5.20 5.77*   HGB 12.7 12.7 12.7 14.2 15.5   HCT 42.0 42.4 42.7 46.3 51.4*    193 202 264 322       Basic Metabolic Panel:  Recent Labs   Lab 02/24/22  0612 02/23/22  0530 02/22/22  0544 02/21/22  0626 02/20/22 2122    140 137 136 134*   POTASSIUM 4.4 4.5 4.7 3.4* 5.6*   CHLORIDE 105 107 104 106 92*   CO2 25 24 23 17* 21*   BUN 18 27* 33*  41* 47*   CR 0.70 0.78 1.29* 2.76* 4.46*   GLC 97 91 119 108 97   EVITA 9.5 9.3 9.2 7.3* 11.0*       INRNo lab results found in last 7 days.     Recent Labs   Lab Test 02/25/22  0502 02/24/22  0612 02/14/22  0510 02/11/22  0502   INR  --   --   --  1.23*   WBC 6.1 5.7   < > 5.9   HGB 12.7 12.7   < > 12.6    193   < > 219    < > = values in this interval not displayed.       Personally reviewed current labs     ~ 35 minutes spent in exam, POC, education regarding renal disease and management.  >90% time spent in education and counseling and/or discussion with patient's care team    Jodee Clark MD  [facility]  [address] [place] [address], [address]  [place] [address]  Phone# [phone]  Fax# [phone]

## 2022-02-26 ENCOUNTER — APPOINTMENT (OUTPATIENT)
Dept: OCCUPATIONAL THERAPY | Facility: HOSPITAL | Age: 47
DRG: 853 | End: 2022-02-26
Payer: COMMERCIAL

## 2022-02-26 LAB
ALBUMIN SERPL-MCNC: 3 G/DL (ref 3.5–5)
ANION GAP SERPL CALCULATED.3IONS-SCNC: 8 MMOL/L (ref 5–18)
BACTERIA BLD CULT: NO GROWTH
BACTERIA BLD CULT: NO GROWTH
BUN SERPL-MCNC: 12 MG/DL (ref 8–22)
CALCIUM SERPL-MCNC: 9.6 MG/DL (ref 8.5–10.5)
CHLORIDE BLD-SCNC: 106 MMOL/L (ref 98–107)
CO2 SERPL-SCNC: 27 MMOL/L (ref 22–31)
CREAT SERPL-MCNC: 0.74 MG/DL (ref 0.6–1.1)
ERYTHROCYTE [DISTWIDTH] IN BLOOD BY AUTOMATED COUNT: 15.1 % (ref 10–15)
GFR SERPL CREATININE-BSD FRML MDRD: >90 ML/MIN/1.73M2
GLUCOSE BLD-MCNC: 97 MG/DL (ref 70–125)
HCT VFR BLD AUTO: 41.2 % (ref 35–47)
HGB BLD-MCNC: 12.8 G/DL (ref 11.7–15.7)
MCH RBC QN AUTO: 27.1 PG (ref 26.5–33)
MCHC RBC AUTO-ENTMCNC: 31.1 G/DL (ref 31.5–36.5)
MCV RBC AUTO: 87 FL (ref 78–100)
PHOSPHATE SERPL-MCNC: 3.9 MG/DL (ref 2.5–4.5)
PLATELET # BLD AUTO: 205 10E3/UL (ref 150–450)
POTASSIUM BLD-SCNC: 3.7 MMOL/L (ref 3.5–5)
RBC # BLD AUTO: 4.72 10E6/UL (ref 3.8–5.2)
SODIUM SERPL-SCNC: 141 MMOL/L (ref 136–145)
WBC # BLD AUTO: 5.3 10E3/UL (ref 4–11)

## 2022-02-26 PROCEDURE — 120N000001 HC R&B MED SURG/OB

## 2022-02-26 PROCEDURE — 80069 RENAL FUNCTION PANEL: CPT | Performed by: INTERNAL MEDICINE

## 2022-02-26 PROCEDURE — 250N000013 HC RX MED GY IP 250 OP 250 PS 637: Performed by: INTERNAL MEDICINE

## 2022-02-26 PROCEDURE — 85027 COMPLETE CBC AUTOMATED: CPT | Performed by: INTERNAL MEDICINE

## 2022-02-26 PROCEDURE — 99207 PR CDG-CUT & PASTE-POTENTIAL IMPACT ON LEVEL: CPT | Performed by: INTERNAL MEDICINE

## 2022-02-26 PROCEDURE — 97535 SELF CARE MNGMENT TRAINING: CPT | Mod: GO

## 2022-02-26 PROCEDURE — 250N000011 HC RX IP 250 OP 636: Performed by: INTERNAL MEDICINE

## 2022-02-26 PROCEDURE — 99232 SBSQ HOSP IP/OBS MODERATE 35: CPT | Mod: GC | Performed by: INTERNAL MEDICINE

## 2022-02-26 PROCEDURE — 99233 SBSQ HOSP IP/OBS HIGH 50: CPT | Performed by: INTERNAL MEDICINE

## 2022-02-26 RX ORDER — CEFUROXIME AXETIL 500 MG/1
500 TABLET ORAL EVERY 12 HOURS SCHEDULED
Status: DISCONTINUED | OUTPATIENT
Start: 2022-02-26 | End: 2022-02-27 | Stop reason: HOSPADM

## 2022-02-26 RX ORDER — CEFUROXIME AXETIL 500 MG/1
500 TABLET ORAL EVERY 12 HOURS
Qty: 16 TABLET | Refills: 0 | Status: SHIPPED | OUTPATIENT
Start: 2022-02-26 | End: 2022-03-06

## 2022-02-26 RX ADMIN — PIPERACILLIN SODIUM AND TAZOBACTAM SODIUM 3.38 G: 3; .375 INJECTION, POWDER, LYOPHILIZED, FOR SOLUTION INTRAVENOUS at 02:57

## 2022-02-26 RX ADMIN — GABAPENTIN 200 MG: 100 CAPSULE ORAL at 13:46

## 2022-02-26 RX ADMIN — SERTRALINE HYDROCHLORIDE 100 MG: 100 TABLET ORAL at 08:01

## 2022-02-26 RX ADMIN — MAGNESIUM OXIDE TAB 400 MG (241.3 MG ELEMENTAL MG) 400 MG: 400 (241.3 MG) TAB at 20:30

## 2022-02-26 RX ADMIN — BUMETANIDE 1 MG: 1 TABLET ORAL at 08:01

## 2022-02-26 RX ADMIN — CEFUROXIME AXETIL 500 MG: 500 TABLET ORAL at 11:59

## 2022-02-26 RX ADMIN — GABAPENTIN 200 MG: 100 CAPSULE ORAL at 08:01

## 2022-02-26 RX ADMIN — METOPROLOL SUCCINATE 25 MG: 25 TABLET, EXTENDED RELEASE ORAL at 08:01

## 2022-02-26 RX ADMIN — RIVAROXABAN 20 MG: 10 TABLET, FILM COATED ORAL at 16:20

## 2022-02-26 RX ADMIN — MICONAZOLE NITRATE: 20 POWDER TOPICAL at 08:02

## 2022-02-26 RX ADMIN — CEFUROXIME AXETIL 500 MG: 500 TABLET ORAL at 20:30

## 2022-02-26 RX ADMIN — MAGNESIUM OXIDE TAB 400 MG (241.3 MG ELEMENTAL MG) 400 MG: 400 (241.3 MG) TAB at 08:01

## 2022-02-26 RX ADMIN — GABAPENTIN 200 MG: 100 CAPSULE ORAL at 20:30

## 2022-02-26 RX ADMIN — WHITE PETROLATUM: 1.75 OINTMENT TOPICAL at 08:02

## 2022-02-26 ASSESSMENT — ACTIVITIES OF DAILY LIVING (ADL)
ADLS_ACUITY_SCORE: 16
ADLS_ACUITY_SCORE: 14
ADLS_ACUITY_SCORE: 15
ADLS_ACUITY_SCORE: 14
ADLS_ACUITY_SCORE: 16
ADLS_ACUITY_SCORE: 14
ADLS_ACUITY_SCORE: 14
ADLS_ACUITY_SCORE: 15
ADLS_ACUITY_SCORE: 16
ADLS_ACUITY_SCORE: 14
ADLS_ACUITY_SCORE: 16
ADLS_ACUITY_SCORE: 14
ADLS_ACUITY_SCORE: 16
ADLS_ACUITY_SCORE: 16
ADLS_ACUITY_SCORE: 14

## 2022-02-26 NOTE — PLAN OF CARE
Goal Outcome Evaluation:    Plan of Care Reviewed With: patient     Overall Patient Progress: improving    Outcome Evaluation: Patient's goal is a home discharge with resumed home care:  PT, OT, RN and home health aide.        Pt continues to void, but urine is bloody. MD aware and has cut down the xarelto dose. Pt had legs wrapped. Pt switched to oral abx

## 2022-02-26 NOTE — PLAN OF CARE
Goal Outcome Evaluation:    Plan of Care Reviewed With: patient     Overall Patient Progress: improving    Outcome Evaluation: Patient's goal is a home discharge with resumed home care:  PT, OT, RN and home health aide.        Problem: UTI (Urinary Tract Infection)  Goal: Improved Infection Symptoms  Outcome: Ongoing, Progressing    Afebrile. Denies urgency, frequency and burning with urination. PO Ceftin. Yanet colored urine. Xarelto given. Using purewick; will monitor for hematuria.

## 2022-02-26 NOTE — PROGRESS NOTES
RENAL PROGRESS NOTE      ASSESSMENT & PLAN:   This is a 46 year old female with past medical history significant for morbid obesity,  congestive heart failure, chronic lymphedema of lower extremities, hypertension, PE on chronic articulation of Xarelto, obstructive apnea on CPAP at night and multiple sclerosis who was recently 02/08-02/18/2022 admitted for COVID-19 infection, acute hypoxic respiratory failure and congestive heart failure exacerbation. Patient presented for further evaluation of inability to urinate.  Patient was found to have sepsis and acute kidney injury.  Nephrology service consulted for acute kidney injury.    Acute renal failure-likely multifactorial secondary to prerenal etiology from septic shock in settings of diuretics and ACE inhibitor's use and obstructive uropathy.  Patient baseline serum creatinine 0.8 mg/dL.  Patient presents with creatinine of 4.46 mg/dL.    Resolved. Serum creatinine is stable at 0.7 mg/dl this am.  Urine output in nonoliguric range w/o diuretics.  S/p cystoscopy with a right ureteral stent placement 02/21  Patient is history of intermittent microscopic hematuria he is going back to June 2011. Likely secondary to nephrolithiasis vs menorrhagia.  Urinalysis on February 80,022 showed 22 red blood cells per hpf, no proteinuria.  Urinalysis in March 2021 showed 120 g/g of protein and 22 red blood cells per hpf.  UA 02/21 showed trace proteinuria and no hematuria. UPCR is 0.3 g/g.       Obstructive uropathy-CT abdomen pelvis showed 8 x 7 by extremity of proximal right ureteral stone with mild hydronephrosis.  S/p cystoscopy with a right ureteral stent placement 02/21. Having gross Hematuria. Hgb is stable this am. Urology is following, input appreciated     Hyperkalemia-  Likely secondary acute kidney injury and metabolic acidosis.  Status post Kayexalate. Resolved.     Metabolic acidosis-likely secondary to HUNG.  Resolved.      Septic shock-etiology unclear.  Chest  x-ray showed no evidence of pulmonary infiltrates. Patient was recently treated for lower extremity cellulitis and discharged on cefuroxime.  Blood and urine cultures are negative to date. Resolved. Switch off IV antibiotics to oral ceftin.         Volume status-hard to assess given patient body habitus.  Patient with history of diastolic just of heart failure.  Prior admission was taken 2 mg of Bumex twice a day. Net 4.5L negative since admission. No weight checked this am. Patient reports improved inner thigh edema.  -continue Bumex to 1 mg daily  -Monitor ins and outs  -Daily weight  -Low-sodium diet  -fluid restriction 2L.     History of hypertension-prior to admission patient was taken  Lisinopril 10 mg daily, metoprolol succinate 25 mg daily and Bumex 2 mg BID. BP is 132/82 this am. Goal BP <130/80. On Toprol XL at 25 mg daily and Bumex 1 mg daily  -recommend no changes  -continue to hold Losartan for now     Hypercalcemia-mild.  Patient had a serum calcium of 11 on admission.  Resolved with hydration.      History of hypomagnesemia-On magnesium oxide 400 g twice daily.      History of pulmonary embolism-PTA on chronic anticoagulation with Xarelto,. Xarelto was on hold due to gross hematuria. Will restart tonight.     History of sleep apnea on CPAP at night     Jodee Clark MD  [facility], [address], [address]  [place] [address]  Phone# [phone]  Fax# [phone]     SUBJECTIVE:   Brar catheter was removed. No acute issues overnight.  Patient states that she is feeling much better. Reports voiding w/o issues since Brar catheter was removed. Reports recurrent gross hematuria after resuming Xarelto last night.  Patient reports good appetite.  Reports  Improved  edema of inner thighs  Patient denies: fever, chills,  dizziness,  cough, shortness of breath , chest pain, palpitations, orthopnea, nausea, vomiting, abdominal pain.    OBJECTIVE:  Physical Exam   Temp: 98   F (36.7  C) Temp src: Axillary BP: 132/82 Pulse: 85   Resp: 16 SpO2: 96 % O2 Device: BiPAP/CPAP Oxygen Delivery: 2 LPM  Vitals:    02/20/22 2056 02/22/22 0600 02/25/22 1347   Weight: (!) 159.7 kg (352 lb) (!) 157.7 kg (347 lb 11.2 oz) (!) 161.1 kg (355 lb 3.2 oz)     Vital Signs with Ranges  Temp:  [98  F (36.7  C)-98.1  F (36.7  C)] 98  F (36.7  C)  Pulse:  [83-91] 85  Resp:  [16] 16  BP: (132-138)/(73-91) 132/82  SpO2:  [93 %-96 %] 96 %  I/O last 3 completed shifts:  In: -   Out: 1800 [Urine:1800]    @TMAXR(24)@    Patient Vitals for the past 72 hrs:   Weight   02/25/22 1347 (!) 161.1 kg (355 lb 3.2 oz)   [unfilled]    PHYSICAL EXAM:  Alert/oriented x 3; awake and NAD, super morbidly obese  HEENT NC/AT; perrla; OP clear without lesions; mmm  Neck supple without LAD, TM  CV; RRR without rub or murmur  Lung: clear and equal; no extra sounds  Ab: Large abdominal pannus, soft and NT; not distended; normal bs  : No Brar catheter  Ext:LE wrapped, stable LE edema and well perfused  Skin; erythema of anterior shins of is a cobblestone appearance consistent with stasis dermatitis  Neuro; grossly intact    LABORATORY STUDIES:     Recent Labs   Lab 02/26/22  0650 02/25/22  0502 02/24/22  0612 02/23/22  0530 02/22/22  0544 02/20/22  2122   WBC 5.3 6.1 5.7 5.8 9.2 14.1*   RBC 4.72 4.71 4.71 4.70 5.20 5.77*   HGB 12.8 12.7 12.7 12.7 14.2 15.5   HCT 41.2 42.0 42.4 42.7 46.3 51.4*    204 193 202 264 322       Basic Metabolic Panel:  Recent Labs   Lab 02/26/22  1128 02/24/22  0612 02/23/22  0530 02/22/22  0544 02/21/22  0626 02/20/22 2122    138 140 137 136 134*   POTASSIUM 3.7 4.4 4.5 4.7 3.4* 5.6*   CHLORIDE 106 105 107 104 106 92*   CO2 27 25 24 23 17* 21*   BUN 12 18 27* 33* 41* 47*   CR 0.74 0.70 0.78 1.29* 2.76* 4.46*   GLC 97 97 91 119 108 97   EVITA 9.6 9.5 9.3 9.2 7.3* 11.0*       INRNo lab results found in last 7 days.     Recent Labs   Lab Test 02/26/22  0650 02/25/22  0502 02/14/22  0510 02/11/22  0502    INR  --   --   --  1.23*   WBC 5.3 6.1   < > 5.9   HGB 12.8 12.7   < > 12.6    204   < > 219    < > = values in this interval not displayed.       Personally reviewed current labs     ~ 35 minutes spent in exam, POC, education regarding renal disease and management.  >90% time spent in education and counseling and/or discussion with patient's care team    Jodee Clark MD  [facility]  [address] [place] [address], [address]  [place] [address]  Phone# [phone]  Fax# [phone]

## 2022-02-26 NOTE — PROGRESS NOTES
Care Management Discharge Note    Discharge Date: 02/26/2022       Discharge Disposition: Home, Home Care      Private pay costs discussed: Not applicable      Patient/Family in Agreement with the Plan: yes    Handoff Referral Completed: Yes    Additional Information:  ALFRED notified [facility] of pts discharge.     SHIRIN Story    Addendum: ALFRED discussed with MD and anticipate pt will now discharge tomorrow. ALFRED updated [facility].  3:58 PM

## 2022-02-26 NOTE — PROGRESS NOTES
[facility]    Medicine Progress Note - Hospitalist Service    Date of Admission:  2/20/2022    Assessment & Plan          46-year-old female recently discharged after admitted for Covid19, acute hypoxic respiratory failure and CHF exacerbation from 2/8-2/18/2022 presents with decreased urine output and found to have HUNG and hypotensive.    UTI, Obstructing ureteral stone: CT abdomen showing 8 x 7 x 3 mm stone proximal right ureter causing mild hydronephrosis. s/p Cystoscopy with right retrograde pyelogram and right ureteral stent placement.    - Discontinue Zosyn and transition to Cefuroxime today  - Follow up urine culture sent on 2/21: no growth  - Follow up blood culture sent on 2/20: no growth  - Discontinued Brar on 2/25. Voiding well  - Developed hematuria after resuming Xarelto on 2/22. Xarelto was then on hold on 2/24. Hematuria resolved. Resumed Xarelto again on 2/25, then hematuria resumed. Patient was found to have small nonocclusive right upper lobe pulmonary embolism on 2/8/22 and she was given heparin drip and transitioned to Xarelto 15 mg bid on 2/11. The plan is to continue anticoagulation for 3 months. Given recent PE, it is indicated to continue anticoagulation. Will transition to 20 mg daily today. It is possible that the hematuria will persist until the stent is removed.   - Hemoglobin has been stable around 12.7. Continue to monitor.    - Per Urology, right ureteral stent will be in place for at least 2 weeks, followed by definitive management with ureteroscopic stone extraction with laser lithotripsy outpatient.     Hypotension: due to hypovolemia and sepsis secondary to UTI. Needed Levophed previously and now tapered off.   - Blood pressure has been stable. Continue to monitor .    HUNG: due to obstructive uropathy. Creatinine 4.4 on admission. Now improved to normal range.  - Continue to monitor.    Hyperkalemia and hypercalcemia: resolved after IVF    HFpEF: no  exacerbation. Llisinopril on hold for hypotension. Resume bumex and metoprolol.    Chronic hypoxic respiratory failure: Continue home oxygen supplementation     Recent pulmonary embolism: Xarelto as above.     NARCISA: CPAP at night    Bilateral chronic lymphedema: Lymphedema consulted    History of MS: has chronic left lower extremity weakness and pain. Supportive care    Morbid obesity: BMI 60.4       Diet: Regular Diet Adult  Fluid restriction 2000 ML FLUID  Diet    DVT Prophylaxis: DOAC  Brar Catheter: Not present  Central Lines: PRESENT  PICC Triple Lumen 02/21/22 Right Brachial vein medial Vasporessors-Site Assessment: WDL  Cardiac Monitoring: None  Code Status: Full Code      Disposition Plan   Expected Discharge: 02/27/2022     Anticipated discharge location:  Awaiting care coordination huddle         The patient's care was discussed with the Bedside Nurse, Care Coordinator/ and Patient.    Laxmi Madison MD  Hospitalist Service  [facility]  Securely message with the Vocera Web Console (learn more here)  Text page via [employer] Paging/Directory       ______________________________________________________________________    Interval History    Xarelto resumed last night and patient developed gross hematuria again. She has no difficulty of voiding. We discussed about plan of care. Patient does not feel comfortable to go home today given recurrent hematuria with Xarelto.     Data reviewed today: I reviewed all medications, new labs and imaging results over the last 24 hours.     Physical Exam   Vital Signs: Temp: 98.4  F (36.9  C) Temp src: Oral BP: 139/80 Pulse: 89   Resp: 16 SpO2: 92 % O2 Device: BiPAP/CPAP Oxygen Delivery: 2 LPM  Weight: 355 lbs 3.2 oz    General appearance: not in acute distress  HEENT: PERRL, EOMI  Lungs: Clear breath sounds in bilateral lung fields  Cardiovascular: Regular rate and rhythm, normal S1-S2  Abdomen: Soft, non tender, no distension, normal bowel  sound  Musculoskeletal: No joint swelling  Skin: Chronic bilateral lower leg lymphedema  Neurology: AAO ×3.  Cranial nerves II - XII normal.  Grossly normal muscle strength in all four extremities.    Data   Recent Labs   Lab 02/26/22  1128 02/26/22  0650 02/25/22  0502 02/24/22  0612 02/23/22  0530 02/21/22  0626 02/20/22  2122   WBC  --  5.3 6.1 5.7 5.8   < > 14.1*   HGB  --  12.8 12.7 12.7 12.7   < > 15.5   MCV  --  87 89 90 91   < > 89   PLT  --  205 204 193 202   < > 322     --   --  138 140   < > 134*   POTASSIUM 3.7  --   --  4.4 4.5   < > 5.6*   CHLORIDE 106  --   --  105 107   < > 92*   CO2 27  --   --  25 24   < > 21*   BUN 12  --   --  18 27*   < > 47*   CR 0.74  --   --  0.70 0.78   < > 4.46*   ANIONGAP 8  --   --  8 9   < > 21*   EVITA 9.6  --   --  9.5 9.3   < > 11.0*   GLC 97  --   --  97 91   < > 97   ALBUMIN 3.0*  --   --   --   --   --  3.7   PROTTOTAL  --   --   --   --   --   --  8.1*   BILITOTAL  --   --   --   --   --   --  1.8*   ALKPHOS  --   --   --   --   --   --  66   ALT  --   --   --   --   --   --  19   AST  --   --   --   --   --   --  31    < > = values in this interval not displayed.

## 2022-02-26 NOTE — PLAN OF CARE
Goal Outcome Evaluation:    VSS. Pt having no pain. CPAP on overnight. Purewick in place with zarina output. Pt slept comfortable with no complaints of significant changes

## 2022-02-26 NOTE — PLAN OF CARE
"  Problem: Plan of Care - These are the overarching goals to be used throughout the patient stay.    Goal: Plan of Care Review/Shift Note  Description: The Plan of Care Review/Shift note should be completed every shift.  The Outcome Evaluation is a brief statement about your assessment that the patient is improving, declining, or no change.  This information will be displayed automatically on your shift note.  Outcome: Ongoing, Progressing  Goal: Patient-Specific Goal (Individualized)  Description: You can add care plan individualizations to a care plan. Examples of Individualization might be:  \"Parent requests to be called daily at 9am for status\", \"I have a hard time hearing out of my right ear\", or \"Do not touch me to wake me up as it startles me\".  Outcome: Ongoing, Progressing  Goal: Absence of Hospital-Acquired Illness or Injury  Outcome: Ongoing, Progressing  Intervention: Identify and Manage Fall Risk  Recent Flowsheet Documentation  Taken 2/25/2022 1600 by Brandie Marie RN  Safety Promotion/Fall Prevention:    nonskid shoes/slippers when out of bed    mobility aid in reach    room door open  Intervention: Prevent Infection  Recent Flowsheet Documentation  Taken 2/25/2022 1600 by Brandie Marie RN  Infection Prevention: hand hygiene promoted  Goal: Optimal Comfort and Wellbeing  Outcome: Ongoing, Progressing  Intervention: Provide Person-Centered Care  Recent Flowsheet Documentation  Taken 2/25/2022 1600 by Brandie Marie RN  Trust Relationship/Rapport:    care explained    questions answered  Goal: Readiness for Transition of Care  Outcome: Ongoing, Progressing     Problem: Risk for Delirium  Goal: Optimal Coping  Outcome: Ongoing, Progressing  Goal: Improved Behavioral Control  Outcome: Ongoing, Progressing  Goal: Improved Attention and Thought Clarity  Outcome: Ongoing, Progressing  Goal: Improved Sleep  Outcome: Ongoing, Progressing     Problem: Fluid and Electrolyte Imbalance (Acute Kidney " Injury/Impairment)  Goal: Fluid and Electrolyte Balance  Outcome: Ongoing, Progressing     Problem: Oral Intake Inadequate (Acute Kidney Injury/Impairment)  Goal: Optimal Nutrition Intake  Outcome: Ongoing, Progressing     Problem: Renal Function Impairment (Acute Kidney Injury/Impairment)  Goal: Effective Renal Function  Outcome: Ongoing, Progressing  Intervention: Monitor and Support Renal Function  Recent Flowsheet Documentation  Taken 2/25/2022 1600 by Brandie Marie RN  Medication Review/Management: medications reviewed     Problem: Skin or Soft Tissue Infection  Goal: Absence of Infection Signs and Symptoms  Outcome: Ongoing, Progressing  Intervention: Minimize and Manage Infection Progression  Recent Flowsheet Documentation  Taken 2/25/2022 1600 by Brandie Marie RN  Infection Prevention: hand hygiene promoted   Goal Outcome Evaluation:    Plan of Care Reviewed With: patient     Overall Patient Progress: improving.Pt is alert and oriented,voiding but incontinent at times due to urgency and frequency.Bladder scan 38cc and 66cc this evening.Purwick utilized at bedtime per pt request.Vitals stable.    Outcome Evaluation: Patient's goal is a home discharge with resumed home care:  PT, OT, RN and home health aide.

## 2022-02-27 ENCOUNTER — APPOINTMENT (OUTPATIENT)
Dept: OCCUPATIONAL THERAPY | Facility: HOSPITAL | Age: 47
DRG: 853 | End: 2022-02-27
Payer: COMMERCIAL

## 2022-02-27 VITALS
HEIGHT: 64 IN | BODY MASS INDEX: 50.02 KG/M2 | TEMPERATURE: 97.7 F | HEART RATE: 87 BPM | OXYGEN SATURATION: 97 % | WEIGHT: 293 LBS | RESPIRATION RATE: 18 BRPM | SYSTOLIC BLOOD PRESSURE: 128 MMHG | DIASTOLIC BLOOD PRESSURE: 86 MMHG

## 2022-02-27 PROBLEM — N20.1 URETERAL STONE: Status: ACTIVE | Noted: 2022-02-27

## 2022-02-27 PROBLEM — N39.0 URINARY TRACT INFECTION: Status: ACTIVE | Noted: 2022-02-27

## 2022-02-27 PROBLEM — A41.9 SEPSIS (H): Status: ACTIVE | Noted: 2018-10-23

## 2022-02-27 PROBLEM — N13.2 HYDRONEPHROSIS WITH URINARY OBSTRUCTION DUE TO URETERAL CALCULUS: Status: ACTIVE | Noted: 2022-02-27

## 2022-02-27 LAB
ERYTHROCYTE [DISTWIDTH] IN BLOOD BY AUTOMATED COUNT: 15.3 % (ref 10–15)
HCT VFR BLD AUTO: 42.4 % (ref 35–47)
HGB BLD-MCNC: 13 G/DL (ref 11.7–15.7)
MCH RBC QN AUTO: 26.6 PG (ref 26.5–33)
MCHC RBC AUTO-ENTMCNC: 30.7 G/DL (ref 31.5–36.5)
MCV RBC AUTO: 87 FL (ref 78–100)
PLATELET # BLD AUTO: 207 10E3/UL (ref 150–450)
RBC # BLD AUTO: 4.88 10E6/UL (ref 3.8–5.2)
WBC # BLD AUTO: 5.5 10E3/UL (ref 4–11)

## 2022-02-27 PROCEDURE — 250N000013 HC RX MED GY IP 250 OP 250 PS 637: Performed by: INTERNAL MEDICINE

## 2022-02-27 PROCEDURE — 99232 SBSQ HOSP IP/OBS MODERATE 35: CPT | Mod: GC | Performed by: INTERNAL MEDICINE

## 2022-02-27 PROCEDURE — 97535 SELF CARE MNGMENT TRAINING: CPT | Mod: GO

## 2022-02-27 PROCEDURE — 85027 COMPLETE CBC AUTOMATED: CPT | Performed by: INTERNAL MEDICINE

## 2022-02-27 PROCEDURE — 99239 HOSP IP/OBS DSCHRG MGMT >30: CPT | Performed by: INTERNAL MEDICINE

## 2022-02-27 RX ADMIN — GABAPENTIN 200 MG: 100 CAPSULE ORAL at 08:00

## 2022-02-27 RX ADMIN — MAGNESIUM OXIDE TAB 400 MG (241.3 MG ELEMENTAL MG) 400 MG: 400 (241.3 MG) TAB at 08:00

## 2022-02-27 RX ADMIN — CEFUROXIME AXETIL 500 MG: 500 TABLET ORAL at 08:00

## 2022-02-27 RX ADMIN — SERTRALINE HYDROCHLORIDE 100 MG: 100 TABLET ORAL at 08:00

## 2022-02-27 RX ADMIN — WHITE PETROLATUM: 1.75 OINTMENT TOPICAL at 08:01

## 2022-02-27 RX ADMIN — BUMETANIDE 1 MG: 1 TABLET ORAL at 08:00

## 2022-02-27 RX ADMIN — METOPROLOL SUCCINATE 25 MG: 25 TABLET, EXTENDED RELEASE ORAL at 08:00

## 2022-02-27 RX ADMIN — MICONAZOLE NITRATE: 20 POWDER TOPICAL at 08:00

## 2022-02-27 ASSESSMENT — ACTIVITIES OF DAILY LIVING (ADL)
ADLS_ACUITY_SCORE: 16
ADLS_ACUITY_SCORE: 15
ADLS_ACUITY_SCORE: 16
ADLS_ACUITY_SCORE: 15
ADLS_ACUITY_SCORE: 16
ADLS_ACUITY_SCORE: 15

## 2022-02-27 NOTE — PROGRESS NOTES
RENAL PROGRESS NOTE      ASSESSMENT & PLAN:   This is a 46 year old female with past medical history significant for morbid obesity,  congestive heart failure, chronic lymphedema of lower extremities, hypertension, PE on chronic articulation of Xarelto, obstructive apnea on CPAP at night and multiple sclerosis who was recently 02/08-02/18/2022 admitted for COVID-19 infection, acute hypoxic respiratory failure and congestive heart failure exacerbation. Patient presented for further evaluation of inability to urinate.  Patient was found to have sepsis and acute kidney injury.  Nephrology service consulted for acute kidney injury.    Non oliguric Acute renal failure-likely multifactorial secondary to prerenal etiology from septic shock in settings of diuretics and ACE inhibitor's use and obstructive uropathy.  Patient baseline serum creatinine 0.8 mg/dL.  Patient presents with creatinine of 4.46 mg/dL.    Resolved. Serum creatinine was stable and at baseline 0.7 mg/dl 02/26  Urine output in nonoliguric range w/o diuretics.  S/p cystoscopy with a right ureteral stent placement 02/21  Patient is history of intermittent microscopic hematuria he is going back to June 2011. Likely secondary to nephrolithiasis vs menorrhagia.  Urinalysis on February 80,022 showed 22 red blood cells per hpf, no proteinuria.  Urinalysis in March 2021 showed 120 g/g of protein and 22 red blood cells per hpf.  UA 02/21 showed trace proteinuria and no hematuria. UPCR is 0.3 g/g.  OK to discharge from renal stand point  Patient should follow up with her PCP in 2 weeks after dicharge       Obstructive uropathy-CT abdomen pelvis showed 8 x 7 by extremity of proximal right ureteral stone with mild hydronephrosis.  S/p cystoscopy with a right ureteral stent placement 02/21. Having gross Hematuria. Hgb is stable this am. Urology is following, input appreciated     Hyperkalemia-  Likely secondary acute kidney injury and metabolic acidosis.  Status  post Kayexalate. Resolved.     Metabolic acidosis-likely secondary to HUNG.  Resolved.      Septic shock-etiology unclear.  Chest x-ray showed no evidence of pulmonary infiltrates. Patient was recently treated for lower extremity cellulitis and discharged on cefuroxime.  Blood and urine cultures are negative to date. Resolved. Switch off IV antibiotics to oral ceftin.         Volume status-hard to assess given patient body habitus.  Patient with history of diastolic just of heart failure.  Prior admission was taken 2 mg of Bumex twice a day. Net 7.2L negative since admission. Weight was trending down yesterday. No weight checked this am. Patient reports improved LE edema with lymphedema therapy  -continue Bumex to 1 mg daily on discharge. I advise the patient to increase Bumex dose to 1mg BID if lower extremity edema gets worse.  -Continue Lymphedema therapy on discharge  -Daily weight  -Low-sodium diet  -fluid restriction 2L.     History of hypertension-prior to admission patient was taken  Lisinopril 10 mg daily, metoprolol succinate 25 mg daily and Bumex 2 mg BID. BP is 128/86 this am. Goal BP <130/80.   -Recommend to continueToprol XL at 25 mg daily and Bumex 1 mg daily on discharge  -continue to hold Lisinopril on discharge  -patient was advise to monitor her BP daily at home and keep logs for PCP to review. Patient was advise to resume Lisinopril at 5 mg daily if her BP is persistently (>50%) >130/80     Hypercalcemia-mild.  Patient had a serum calcium of 11 on admission.  Resolved with hydration.      History of hypomagnesemia-On magnesium oxide 400 g twice daily.      History of pulmonary embolism-PTA on chronic anticoagulation with Xarelto,. Xarelto was on hold due to gross hematuria. Will restart tonight.     History of sleep apnea on CPAP at night     Jodee Clark MD  [facility]  [address] [place] [address], [address]  [place] [address]  Phone# [phone]  Fax#  [phone]     SUBJECTIVE:   No acute issues overnight.  Patient states that she is feeling much better. Reports ongoing gross hematuria after resuming Xarelto Patient reports good appetite.  Reports improved  edema of lower extremities.  Patient denies: fever, chills,  dizziness,  cough, shortness of breath , chest pain, palpitations, orthopnea, nausea, vomiting, abdominal pain.    OBJECTIVE:  Physical Exam   Temp: 97.7  F (36.5  C) Temp src: Oral BP: 128/86 Pulse: 87   Resp: 18 SpO2: 97 % O2 Device: None (Room air)    Vitals:    02/22/22 0600 02/25/22 1347 02/26/22 1700   Weight: (!) 157.7 kg (347 lb 11.2 oz) (!) 161.1 kg (355 lb 3.2 oz) (!) 157.8 kg (347 lb 14.4 oz)     Vital Signs with Ranges  Temp:  [97.7  F (36.5  C)-98.6  F (37  C)] 97.7  F (36.5  C)  Pulse:  [87-91] 87  Resp:  [16-18] 18  BP: (128-139)/(80-86) 128/86  SpO2:  [92 %-97 %] 97 %  I/O last 3 completed shifts:  In: -   Out: 2700 [Urine:2700]    @TMAXR(24)@    Patient Vitals for the past 72 hrs:   Weight   02/26/22 1700 (!) 157.8 kg (347 lb 14.4 oz)   02/25/22 1347 (!) 161.1 kg (355 lb 3.2 oz)   [unfilled]    PHYSICAL EXAM:  Alert/oriented x 3; awake and NAD, super morbidly obese  HEENT NC/AT; perrla; OP clear without lesions; mmm  Neck supple without LAD, TM  CV; RRR without rub or murmur  Lung: clear and equal; no extra sounds  Ab: Large abdominal pannus, soft and NT; not distended; normal bs  : No Brar catheter  Ext:LE wrapped, improved LE edema and well perfused  Skin; erythema of anterior shins of is a cobblestone appearance consistent with stasis dermatitis  Neuro; grossly intact    LABORATORY STUDIES:     Recent Labs   Lab 02/27/22  0646 02/26/22  0650 02/25/22  0502 02/24/22  0612 02/23/22  0530 02/22/22  0544   WBC 5.5 5.3 6.1 5.7 5.8 9.2   RBC 4.88 4.72 4.71 4.71 4.70 5.20   HGB 13.0 12.8 12.7 12.7 12.7 14.2   HCT 42.4 41.2 42.0 42.4 42.7 46.3    205 204 193 202 264       Basic Metabolic Panel:  Recent Labs   Lab  02/26/22  1128 02/24/22  0612 02/23/22  0530 02/22/22  0544 02/21/22  0626 02/20/22  2122    138 140 137 136 134*   POTASSIUM 3.7 4.4 4.5 4.7 3.4* 5.6*   CHLORIDE 106 105 107 104 106 92*   CO2 27 25 24 23 17* 21*   BUN 12 18 27* 33* 41* 47*   CR 0.74 0.70 0.78 1.29* 2.76* 4.46*   GLC 97 97 91 119 108 97   EVITA 9.6 9.5 9.3 9.2 7.3* 11.0*       INRNo lab results found in last 7 days.     Recent Labs   Lab Test 02/27/22  0646 02/26/22  0650 02/14/22  0510 02/11/22  0502   INR  --   --   --  1.23*   WBC 5.5 5.3   < > 5.9   HGB 13.0 12.8   < > 12.6    205   < > 219    < > = values in this interval not displayed.       Personally reviewed current labs     ~ 35 minutes spent in exam, POC, education regarding renal disease and management.  >90% time spent in education and counseling and/or discussion with patient's care team    Jodee Clark MD  [facility], [address]  [place] [address]  Phone# [phone]  Fax# [phone]

## 2022-02-27 NOTE — PROGRESS NOTES
Care Management Discharge Note    Discharge Date: 02/27/2022       Discharge Disposition: Home, Home Care    Discharge Services: None    Discharge DME: None    Discharge Transportation: family or friend will provide    Private pay costs discussed: Not applicable    PAS Confirmation Code:  (Not needed)  Patient/family educated on Medicare website which has current facility and service quality ratings: yes    Education Provided on the Discharge Plan: Per team   Persons Notified of Discharge Plans: patient, home care agency  Patient/Family in Agreement with the Plan: yes    Handoff Referral Completed: Yes    Additional Information:  Plan to discharge home with resumption of home care RN/PT/OT/HHA services through [facility]. Notified [facility] intake plan to discharge to home today and orders are in; they will notify the team. Family to transport.       Sandra Barrera RN

## 2022-02-27 NOTE — PLAN OF CARE
Physical Therapy Discharge Summary    Reason for therapy discharge:    Discharged to home with home therapy.    Progress towards therapy goal(s). See goals on Care Plan in [facility] electronic health record for goal details.  Goals partially met.  Barriers to achieving goals:   discharge from facility.    Therapy recommendation(s):    Continued therapy is recommended.  Rationale/Recommendations:  Recommend home PT..    Thelma Peñaloza, PT  2/27/2022

## 2022-02-27 NOTE — PLAN OF CARE
Goal Outcome Evaluation:    Plan of Care Reviewed With: patient     Overall Patient Progress: improving    Outcome Evaluation: Home with resumption of home care services through [facility].

## 2022-02-27 NOTE — PLAN OF CARE
Goal Outcome Evaluation:    Plan of Care Reviewed With: patient     Overall Patient Progress: improving    Outcome Evaluation: Patient's goal is a home discharge with resumed home care:  PT, OT, RN and home health aide.        Discussed plan for discharge to home

## 2022-02-27 NOTE — DISCHARGE SUMMARY
[facility]  Hospitalist Discharge Summary      Date of Admission:  2/20/2022  Date of Discharge:  2/27/2022  Discharging Provider: Laxmi Madison MD  Discharge Service: Hospitalist Service    Discharge Diagnoses     Principal Problem:    Ureteral stone  Active Problems:    Multiple sclerosis (H)    Morbid obesity (H)    Benign essential hypertension    NARCISA (obstructive sleep apnea)    Sepsis (H)    Lymphedema of both lower extremities    Chronic diastolic congestive heart failure (H)    Hyperkalemia    Acute renal failure, unspecified acute renal failure type (H)    Hypotension due to hypovolemia    History of pulmonary embolism    Urinary tract infection    Hydronephrosis with urinary obstruction due to ureteral calculus      Follow-ups Needed After Discharge   Follow-up Appointments     Follow-up and recommended labs and tests      Please follow up with [facility] for PVR check next week. Office staff will also   contact you to establish definitive stone management ( removal of your   ureteral stone and stent exchange)  You may call to confirm appointment as   needed. [facility], [phone]         Follow-up and recommended labs and tests       * Follow up with your primary care provider in one week about your   hematuria and check hemoglobin.           Discharge Disposition   Discharged to home  Condition at discharge: Stable      Hospital Course     Patient is a 46-year-old female recently discharged after admitted for Covid19, acute hypoxic respiratory failure, acute pulmonary embolism and CHF exacerbation from 2/8-2/18/2022 presents with decreased urine output. She was found to have HUNG and obstructing kidney stone. These issues were addressed as follows.    UTI, sepsis, obstructing ureteral stone: Patient presented with decreased urine output. She had hypotension and leukocytosis. Her blood pressure did not improve with fluid and she was admitted to MICU for  pressors. CT abdomen showed 8 x 7 x 3 mm stone proximal right ureter causing mild hydronephrosis. Patient received urgent cystoscopy with right retrograde pyelogram and right ureteral stent placement. Concerning infected stone, patient was treated with Zosyn and transitioned to Cefuroxime. Patient was found to have small nonocclusive right upper lobe pulmonary embolism on 2/8/22 and she was given heparin drip and transitioned to Xarelto 15 mg bid on 2/11. The plan is to continue anticoagulation for 3 months. Xarelto was resumed after stent placement. However, patient developed gross hematuria. Xarelto was then on hold and hematuria resolved. However, when Xarelto was resumed again, hematuria reoccurred. Given her recent PE, it is indicated to continue anticoagulation. It is possible that the hematuria will persist until the stent is removed. Hemoglobin has been stable around 12.7-13 during hospitalization. Per Urology, right ureteral stent will be in place for at least 2 weeks, followed by definitive management with ureteroscopic stone extraction with laser lithotripsy outpatient.      HUNG: due to obstructive uropathy. Creatinine was 4.4 on admission. It improved to normal range after stent placement.     Hyperkalemia and hypercalcemia: resolved after IVF.      HFpEF: no exacerbation. Resume home medication.      Chronic hypoxic respiratory failure: Continue home oxygen supplementation      Recent pulmonary embolism: Xarelto as above.      NARCISA: CPAP at night     Bilateral chronic lymphedema: Lymphedema consulted     History of MS: has chronic left lower extremity weakness and pain. Supportive care     Morbid obesity: BMI 60.4      Consultations This Hospital Stay   VASCULAR ACCESS ADULT IP CONSULT  PHARMACY TO DOSE VANCO  NEPHROLOGY IP CONSULT  INTENSIVIST IP CONSULT  UROLOGY IP CONSULT  SOCIAL WORK IP CONSULT  WOUND OSTOMY CONTINENCE NURSE  IP CONSULT  LYMPHEDEMA THERAPY IP CONSULT  PHYSICAL THERAPY ADULT IP  CONSULT  OCCUPATIONAL THERAPY ADULT IP CONSULT    Code Status   Prior    Time Spent on this Encounter   I, Laxmi Madison MD, personally saw the patient today and spent greater than 30 minutes discharging this patient.       Laxmi Madison MD  [address] [address]  Phone: [phone]  Fax: [phone]  ______________________________________________________________________    Physical Exam   Vital Signs: Temp: 97.7  F (36.5  C) Temp src: Oral BP: 128/86 Pulse: 87   Resp: 18 SpO2: 97 % O2 Device: None (Room air)    Weight: 347 lbs 14.4 oz    General appearance: not in acute distress  HEENT: PERRL, EOMI  Lungs: Clear breath sounds in bilateral lung fields  Cardiovascular: Regular rate and rhythm, normal S1-S2  Abdomen: Soft, non tender, no distension  Musculoskeletal: No joint swelling  Skin: No rash and no edema  Neurology: AAO ×3.  Cranial nerves II - XII normal.  Normal muscle strength in all four extremities.       Primary Care Physician   Mikala Luna    Discharge Orders      Home Care Referral      Primary Care - Care Coordination Referral      Follow-up and recommended labs and tests    Please follow up with [facility] for PVR check next week. Office staff will also contact you to establish definitive stone management ( removal of your ureteral stone and stent exchange)  You may call to confirm appointment as needed. [facility], [phone]     Reason for your hospital stay    * Admitted for UTI, obstructive ureteral stone. Received stent placement and antibiotic treatment.     Activity    Your activity upon discharge: activity as tolerated     Follow-up and recommended labs and tests     * Follow up with your primary care provider in one week about your hematuria and check hemoglobin.     Diet    Follow this diet upon discharge: Orders Placed This Encounter      Fluid restriction 2000 ML FLUID      Regular Diet Adult        Significant Results and Procedures   Most Recent 3 CBC's:Recent Labs   Lab Test 02/27/22  0646 02/26/22  0650 02/25/22  0502   WBC 5.5 5.3 6.1   HGB 13.0 12.8 12.7   MCV 87 87 89    205 204     Most Recent 3 BMP's:Recent Labs   Lab Test 02/26/22  1128 02/24/22  0612 02/23/22  0530    138 140   POTASSIUM 3.7 4.4 4.5   CHLORIDE 106 105 107   CO2 27 25 24   BUN 12 18 27*   CR 0.74 0.70 0.78   ANIONGAP 8 8 9   EVITA 9.6 9.5 9.3   GLC 97 97 91       CT ABDOMEN PELVIS W/O CONTRAST 2/21/22    FINDINGS:   LOWER CHEST: Normal.   HEPATOBILIARY: Hepatic steatosis. Postcholecystectomy. No biliary dilatation.   PANCREAS: Normal.   SPLEEN: Normal.   ADRENAL GLANDS: Normal.   KIDNEYS/BLADDER: 8 x 7 x 3 mm stone in the proximal right ureter causes mild hydronephrosis. Normal left kidney and bladder.   BOWEL: Colonic diverticulosis. No bowel obstruction or inflammation. Normal appendix.   LYMPH NODES: Normal.   VASCULATURE: Unremarkable.   PELVIC ORGANS: No change in the 9.4 cm left adnexal mass which has fat, soft tissue, and calcified components.   MUSCULOSKELETAL: Normal.                                                                    IMPRESSION:   1.  8 x 7 x 3 mm stone in the proximal right ureter causes mild hydronephrosis.  2.  Colonic diverticulosis.  3.  Hepatic steatosis.  4.  No significant change in the 9.4 cm left ovarian dermoid.    XR chest 2/20/22  IMPRESSION: Large body habitus. Shallow inspiration with crowding of pulmonary vessels. No signs of pneumonia or failure. Heart and pulmonary vascularity are normal.      Discharge Medications   Discharge Medication List as of 2/27/2022 12:03 PM      CONTINUE these medications which have CHANGED    Details   cefuroxime (CEFTIN) 500 MG tablet Take 1 tablet (500 mg) by mouth every 12 hours for 8 days, Disp-16 tablet, R-0, E-Prescribe      rivaroxaban ANTICOAGULANT (XARELTO) 20 MG TABS tablet Take 1 tablet (20 mg) by mouth daily (with dinner), Disp-90  tablet, R-0, E-Prescribe         CONTINUE these medications which have NOT CHANGED    Details   acetaminophen (TYLENOL) 325 MG tablet Take 650 mg by mouth every 6 hours as needed for mild pain , Historical      bumetanide (BUMEX) 2 MG tablet Take 1 tablet (2 mg) by mouth 2 times daily, Disp-60 tablet, R-0, E-Prescribe      Cholecalciferol (VITAMIN D3 PO) Take 10,000 Units by mouth daily , Historical      desogestrel-ethinyl estradiol (APRI) 0.15-30 MG-MCG tablet TAKE 1 TABLET DAILY CONTINUOUSLY-OMIT PLACEBPO TABS UNTIL AFTER 3 MONTHS OF HORMONE TABS, Disp-112 tablet, R-3, E-Prescribe      gabapentin (NEURONTIN) 100 MG capsule TAKE 2 CAPSULES(200 MG) BY MOUTH THREE TIMES DAILY, Disp-540 capsule, R-1, E-PrescribeZERO refills remain on this prescription. Your patient is requesting advance approval of refills for this medication to PREVENT ANY MISSED DOSES      lisinopril (ZESTRIL) 10 MG tablet Take 1 tablet (10 mg) by mouth daily, Disp-30 tablet, R-0, E-Prescribe      magnesium oxide (MAG-OX) 400 MG tablet Take 1 tablet (400 mg) by mouth 2 times daily, Disp-4 tablet, R-0, E-Prescribe      metoprolol succinate ER (TOPROL-XL) 25 MG 24 hr tablet TAKE 1 TABLET(25 MG) BY MOUTH DAILY, Disp-90 tablet, R-1, E-PrescribeZERO refills remain on this prescription. Your patient is requesting advance approval of refills for this medication to PREVENT ANY MISSED DOSES      miconazole (MICATIN) 2 % external powder Apply topically 2 times dailyTransitional      sertraline (ZOLOFT) 100 MG tablet Take 1 tablet (100 mg) by mouth daily, Disp-90 tablet, R-3, E-Prescribe           Allergies   Allergies   Allergen Reactions     Nkda [No Known Drug Allergies]

## 2022-02-27 NOTE — PLAN OF CARE
Lymphedema therapy Discharge Summary    Reason for therapy discharge:    Discharged to home.    Progress towards therapy goal(s). See goals on Care Plan in [facility] electronic health record for goal details.  Goals met    Therapy recommendation(s):    Continued therapy is recommended.  Rationale/Recommendations:  Outpt. Lymph.    Jen Bunn MOT, OTR/L, CLT 2/27/2022 , 10:35 AM  .

## 2022-02-27 NOTE — PLAN OF CARE
Occupational Therapy Discharge Summary    Reason for therapy discharge:    Discharged to home.    Progress towards therapy goal(s). See goals on Care Plan in [facility] electronic health record for goal details.  Goals partially met.  Barriers to achieving goals:   discharge from facility.    Therapy recommendation(s):    No further therapy is recommended.    Goal Outcome Evaluation:    Plan of Care Reviewed With: patient     Overall Patient Progress: improving    Outcome Evaluation: Home with resumption of home care services through [facility].

## 2022-02-27 NOTE — PLAN OF CARE
Problem: Plan of Care - These are the overarching goals to be used throughout the patient stay.    Goal: Optimal Comfort and Wellbeing  Outcome: Ongoing, Progressing     Problem: Risk for Delirium  Goal: Optimal Coping  Outcome: Ongoing, Progressing  Goal: Improved Sleep  Outcome: Ongoing, Progressing   Goal Outcome Evaluation:    No issues overnight. CPAP worn. Slept well.       Plan of Care Reviewed With: patient     Overall Patient Progress: improving    Outcome Evaluation: Patient's goal is a home discharge with resumed home care:  PT, OT, RN and home health aide.

## 2022-02-28 ENCOUNTER — E-VISIT (OUTPATIENT)
Dept: FAMILY MEDICINE | Facility: CLINIC | Age: 47
End: 2022-02-28
Payer: COMMERCIAL

## 2022-02-28 ENCOUNTER — PATIENT OUTREACH (OUTPATIENT)
Dept: CARE COORDINATION | Facility: CLINIC | Age: 47
End: 2022-02-28

## 2022-02-28 DIAGNOSIS — I26.99 OTHER ACUTE PULMONARY EMBOLISM, UNSPECIFIED WHETHER ACUTE COR PULMONALE PRESENT (H): Primary | ICD-10-CM

## 2022-02-28 DIAGNOSIS — N20.1 URETERAL STONE: Primary | ICD-10-CM

## 2022-02-28 PROCEDURE — 99421 OL DIG E/M SVC 5-10 MIN: CPT | Performed by: FAMILY MEDICINE

## 2022-03-01 ENCOUNTER — PATIENT OUTREACH (OUTPATIENT)
Dept: NURSING | Facility: CLINIC | Age: 47
End: 2022-03-01
Payer: COMMERCIAL

## 2022-03-01 DIAGNOSIS — N20.1 URETERAL STONE: Primary | ICD-10-CM

## 2022-03-01 SDOH — HEALTH STABILITY: PHYSICAL HEALTH: ON AVERAGE, HOW MANY MINUTES DO YOU ENGAGE IN EXERCISE AT THIS LEVEL?: 0 MIN

## 2022-03-01 SDOH — HEALTH STABILITY: PHYSICAL HEALTH: ON AVERAGE, HOW MANY DAYS PER WEEK DO YOU ENGAGE IN MODERATE TO STRENUOUS EXERCISE (LIKE A BRISK WALK)?: 0 DAYS

## 2022-03-01 SDOH — ECONOMIC STABILITY: FOOD INSECURITY: WITHIN THE PAST 12 MONTHS, YOU WORRIED THAT YOUR FOOD WOULD RUN OUT BEFORE YOU GOT MONEY TO BUY MORE.: NEVER TRUE

## 2022-03-01 SDOH — ECONOMIC STABILITY: FOOD INSECURITY: WITHIN THE PAST 12 MONTHS, THE FOOD YOU BOUGHT JUST DIDN'T LAST AND YOU DIDN'T HAVE MONEY TO GET MORE.: NEVER TRUE

## 2022-03-01 ASSESSMENT — ACTIVITIES OF DAILY LIVING (ADL): DEPENDENT_IADLS:: TRANSPORTATION;MEDICATION MANAGEMENT

## 2022-03-01 ASSESSMENT — SOCIAL DETERMINANTS OF HEALTH (SDOH)
HOW HARD IS IT FOR YOU TO PAY FOR THE VERY BASICS LIKE FOOD, HOUSING, MEDICAL CARE, AND HEATING?: NOT VERY HARD
IN A TYPICAL WEEK, HOW MANY TIMES DO YOU TALK ON THE PHONE WITH FAMILY, FRIENDS, OR NEIGHBORS?: TWICE A WEEK
HOW OFTEN DO YOU GET TOGETHER WITH FRIENDS OR RELATIVES?: TWICE A WEEK

## 2022-03-01 NOTE — LETTER
[facility] COORDINATION  [address]  [facility] [address]    March 1, 2022    Bess Enamorado  [address] [facility] [address]      Dear Bess,    I am a clinic care coordinator who works with Mikala Luna MD at [facility] . I wanted to thank you for spending the time to talk with me.  Below is a description of clinic care coordination and how I can further assist you.      The clinic care coordination team is made up of a registered nurse,  and community health worker who understand the health care system. The goal of clinic care coordination is to help you manage your health and improve access to the health care system in the most efficient manner. The team can assist you in meeting your health care goals by providing education, coordinating services, strengthening the communication among your providers and supporting you with any resource needs.    Please feel free to contact me at [phone] with any questions or concerns. We are focused on providing you with the highest-quality healthcare experience possible and that all starts with you.     Sincerely,     [facility]   Meenakshi Campbell RN, Care Coordinator   [facility]'s   E-mail [email or web address]   [phone]    Enclosed: I have enclosed a copy of a 24 Hour Access Plan. This has helpful phone numbers for you to call when needed. Please keep this in an easy to access place to use as needed.

## 2022-03-01 NOTE — LETTER
Health Care Home - Access Care Plan    About Me:    Patient Name:  Bess Enamorado    YOB: 1975  Age:                      46 year old   Karyn MRN:     [id number] Telephone Information:   Home Phone [phone]   Mobile [phone]       Address:  [facility] [address] [address] Email address:  [email or web address]      Emergency Contact(s)   Name Relationship Lgl Grd Work Phone Home Phone Mobile Phone   1. KEVIN VIERA Friend No  [phone]   2. [place] ENAMORADO Sister   [phone]             Health Maintenance: Routine Health maintenance Reviewed: Not assessed    My Access Plan  Medical Emergency 911   Questions or concerns during clinic hours Primary Clinic Line, I will call the clinic directly: [facility] - [phone]   24 Hour Appointment Line [phone] or  [phone] [facility] ([phone]) (toll free)   24 Hour Nurse Line [phone] (toll free)   Questions or concerns outside clinic hours 24 Hour Appointment Line, I will call the after-hours on-call line:   [place] [phone] or [phone] ([phone]) (toll-free)   Preferred Urgent Care     Preferred Hospital [facility]  [phone]   Preferred Pharmacy [facility] #[id number] [address] [place] [address] AT [address]     Behavioral Health Crisis Line The National Suicide Prevention Lifeline at 1-869.514.1698 or 735           My Care Team Members  Patient Care Team       Relationship Specialty Notifications Start End    Mikala Luna MD PCP - General Family Practice  8/16/19     Phone: [phone] Pager: [phone] Fax: [phone] [address] [facility] [address]    Mikala Luna MD Assigned PCP   6/16/19     Phone: [phone] Pager: [phone] Fax: [phone] [address] [facility] [address]    Alan Roberts MD MD OB/Gyn  8/15/19      Phone: [phone] Fax: [phone]         [facility] [address] [facility] [address]           My Medical and Care Information  Problem List   Patient Active Problem List   Diagnosis     Multiple sclerosis (H)     Polycystic ovaries     Constipation     CARDIOVASCULAR SCREENING; LDL GOAL LESS THAN 160     Anxiety     Restless legs     Leg edema     Eating disorder     Papanicolaou smear of cervix with low grade squamous intraepithelial lesion (LGSIL)     Morbid obesity (H)     Peroneal tendon rupture     Benign essential hypertension     NARCISA (obstructive sleep apnea)     Moderate episode of recurrent major depressive disorder (H)     Cellulitis of abdominal wall     Sepsis (H)     History of abnormal cervical Pap smear     Generalized anxiety disorder     Lymphedema of both lower extremities     Tachycardia     SIRS (systemic inflammatory response syndrome) (H)     Cellulitis of right leg     Cellulitis     Pneumonia due to infectious organism, unspecified laterality, unspecified part of lung     Acute pulmonary embolism without acute cor pulmonale, unspecified pulmonary embolism type (H)     COVID-19     Chronic diastolic congestive heart failure (H)     Acute respiratory failure with hypoxia (H)     NSTEMI (non-ST elevated myocardial infarction) (H)     Dehydration     Hyperkalemia     Acute renal failure, unspecified acute renal failure type (H)     Hypotension due to hypovolemia     History of pulmonary embolism     Ureteral stone     Urinary tract infection     Hydronephrosis with urinary obstruction due to ureteral calculus      Current Medications and Allergies:   Current Outpatient Medications   Medication     acetaminophen (TYLENOL) 325 MG tablet     bumetanide (BUMEX) 2 MG tablet     cefuroxime (CEFTIN) 500 MG tablet     Cholecalciferol (VITAMIN D3 PO)     [START ON 3/5/2022] desogestrel-ethinyl estradiol (APRI) 0.15-30 MG-MCG tablet     gabapentin (NEURONTIN) 100 MG capsule      lisinopril (ZESTRIL) 10 MG tablet     magnesium oxide (MAG-OX) 400 MG tablet     metoprolol succinate ER (TOPROL-XL) 25 MG 24 hr tablet     miconazole (MICATIN) 2 % external powder     rivaroxaban ANTICOAGULANT (XARELTO) 20 MG TABS tablet     sertraline (ZOLOFT) 100 MG tablet     No current facility-administered medications for this visit.

## 2022-03-01 NOTE — PROGRESS NOTES
Clinic Care Coordination Contact    Clinic Care Coordination Contact  OUTREACH    Referral Information:  Referral Source: IP Report    Primary Diagnosis: Genitourinary Disorders    Chief Complaint   Patient presents with     Clinic Care Coordination - Post Hospital     Clinic Care Coordination RN         Universal Utilization:   Date of Admission:  2/20/2022  Date of Discharge:  2/27/2022  Principal Problem:    Ureteral stone  Active Problems:    Multiple sclerosis (H)    Morbid obesity (H)    Benign essential hypertension    NARCISA (obstructive sleep apnea)    Sepsis (H)    Lymphedema of both lower extremities    Chronic diastolic congestive heart failure (H)    Hyperkalemia    Acute renal failure, unspecified acute renal failure type (H)    Hypotension due to hypovolemia    History of pulmonary embolism    Urinary tract infection    Hydronephrosis with urinary obstruction due to ureteral calculus  Clinic Utilization  Difficulty keeping appointments:: No  Compliance Concerns: No  No-Show Concerns: No  Utilization    Hospital Admissions  3             ED Visits  3             No Show Count (past year)  0                Current as of: 2/28/2022  5:12 PM            Clinical Concerns:  Current Medical Concerns:   Patient lives alone but her mother is staying with her right now .  Patient was informed it was normal to have some blood in the urine.    Urine is dark in the evening and then clears up during the day  Patient has her menstrual period now.  Denies any abdominal discomfort   Patient had a visit with [facility] RN and reviewed medications and discharge instructions Patient to see PCP in 1 week for a Hgb check home care RN is checking with clinic to see if the RN can drawn the labs in the home   Patient gets around with a walker at home and uses a wheelchair away from home   Patient continues to restrict fluids to 2000 cc a day.  Some leg edema but decreases with elevation .  Patient has future appointments to  remove stent and replace with another stent   Current Behavioral Concerns: No   Education Provided to patient: CC RN role introduced  Introductory letter and access plan sent via My chart for any future needs    Pain  Pain (GOAL):: No  Health Maintenance Reviewed: Not assessed  Clinical Pathway: None    Medication Management:  Medication review status: Medications reviewed.  Changes noted per patient report.       Functional Status:  Dependent ADLs:: Ambulation-walker, Wheelchair-independent  Dependent IADLs:: Transportation, Medication Management  Bed or wheelchair confined:: No  Mobility Status: Independent w/Device    Living Situation:  Current living arrangement:: I live in a private home    Lifestyle & Psychosocial Needs:    Social Determinants of Health     Tobacco Use: Low Risk      Smoking Tobacco Use: Never Smoker     Smokeless Tobacco Use: Never Used   Alcohol Use: Not on file   Financial Resource Strain: Low Risk      Difficulty of Paying Living Expenses: Not very hard   Food Insecurity: No Food Insecurity     Worried About Running Out of Food in the Last Year: Never true     Ran Out of Food in the Last Year: Never true   Transportation Needs: No Transportation Needs     Lack of Transportation (Medical): No     Lack of Transportation (Non-Medical): No   Physical Activity: Inactive     Days of Exercise per Week: 0 days     Minutes of Exercise per Session: 0 min   Stress: Not on file   Social Connections: Unknown     Frequency of Communication with Friends and Family: Twice a week     Frequency of Social Gatherings with Friends and Family: Twice a week     Attends Yarsani Services: Not on file     Active Member of Clubs or Organizations: Not on file     Attends Club or Organization Meetings: Not on file     Marital Status: Not on file   Intimate Partner Violence: Not At Risk     Fear of Current or Ex-Partner: No     Emotionally Abused: No     Physically Abused: No     Sexually Abused: No   Depression: Not  at risk     PHQ-2 Score: 0   Housing Stability: Not on file     Inadequate nutrition (GOAL):: No  Tube Feeding: No  Inadequate activity/exercise (GOAL):: No  Significant changes in sleep pattern (GOAL): No        Buddhist or spiritual beliefs that impact treatment:: No  Mental health DX:: Yes  Mental health DX how managed:: Medication  Mental health management concern (GOAL):: No  Chemical Dependency Status: No Current Concerns  Informal Support system:: Friends           Resources and Interventions:  Current Resources:   Skilled Home Care Services: Skilled Nursing, Home Health Aid, Physicial Therapy  Community Resources: Home Care  Supplies Currently Used at Home: Oxygen Tubing/Supplies  Equipment Currently Used at Home: walker, rolling, wheelchair, power, shower chair            Advance Care Plan/Directive  Advanced Care Plans/Directives on file:: No    Referrals Placed: None     Patient/Caregiver understanding: Expresses good understanding of discharge instructions        Future Appointments              Tomorrow Valeria Culver RPH [facility]    In 1 week Key Flanagan APRN CNP [facility] DHIRAJ    In 1 week DHIRAJ [facility] HEART FAILURE RN [facility] DHIRAJ    In 2 months [facility]ST[facility] ECHO OP 2; JN HC ECHO STAFF [facility] DHIRAJ          Plan:   Patient will continue [facility]  home care services   No unmet needs, no further care coordination is needed at this time   Patient given CC RN contact to call for any future needs  [facility]   Meenakshi Campbell RN, Care Coordinator   [facility]'s   E-mail [email or web address]   [phone]

## 2022-03-02 ENCOUNTER — VIRTUAL VISIT (OUTPATIENT)
Dept: PHARMACY | Facility: CLINIC | Age: 47
End: 2022-03-02
Attending: INTERNAL MEDICINE
Payer: COMMERCIAL

## 2022-03-02 DIAGNOSIS — N39.0 URINARY TRACT INFECTION WITH HEMATURIA, SITE UNSPECIFIED: ICD-10-CM

## 2022-03-02 DIAGNOSIS — F33.1 MODERATE EPISODE OF RECURRENT MAJOR DEPRESSIVE DISORDER (H): Primary | ICD-10-CM

## 2022-03-02 DIAGNOSIS — I10 BENIGN ESSENTIAL HYPERTENSION: ICD-10-CM

## 2022-03-02 DIAGNOSIS — L03.119 CELLULITIS OF LOWER EXTREMITY, UNSPECIFIED LATERALITY: ICD-10-CM

## 2022-03-02 DIAGNOSIS — Z78.9 TAKES DIETARY SUPPLEMENTS: ICD-10-CM

## 2022-03-02 DIAGNOSIS — B37.2 YEAST INFECTION OF THE SKIN: ICD-10-CM

## 2022-03-02 DIAGNOSIS — I21.4 NSTEMI (NON-ST ELEVATED MYOCARDIAL INFARCTION) (H): ICD-10-CM

## 2022-03-02 DIAGNOSIS — R31.9 URINARY TRACT INFECTION WITH HEMATURIA, SITE UNSPECIFIED: ICD-10-CM

## 2022-03-02 DIAGNOSIS — N20.1 URETERAL STONE: ICD-10-CM

## 2022-03-02 DIAGNOSIS — F41.1 GENERALIZED ANXIETY DISORDER: ICD-10-CM

## 2022-03-02 DIAGNOSIS — G35 MULTIPLE SCLEROSIS (H): ICD-10-CM

## 2022-03-02 DIAGNOSIS — I89.0 LYMPHEDEMA OF BOTH LOWER EXTREMITIES: ICD-10-CM

## 2022-03-02 DIAGNOSIS — I26.99 ACUTE PULMONARY EMBOLISM WITHOUT ACUTE COR PULMONALE, UNSPECIFIED PULMONARY EMBOLISM TYPE (H): ICD-10-CM

## 2022-03-02 PROCEDURE — 99605 MTMS BY PHARM NP 15 MIN: CPT | Performed by: PHARMACIST

## 2022-03-02 NOTE — PROGRESS NOTES
Medication Therapy Management (MTM) Encounter    ASSESSMENT:                            Medication Adherence/Access: No issues identified    Depression/Anxiety: stable    Multiple Sclerosis/Pain: stable    Hypertension/Edema/CAD/History of Pulmonary Embolism: stable    Supplements: stable    Yeast Skin Infection: stable    Kidney stone/UTI/Cellulitis: stable    PLAN:                            1. Continue current drug therapy.    Follow-up: 6 months.    SUBJECTIVE/OBJECTIVE:                          Bess Enamorado is a 46 year old female called for an initial visit. She was referred to me from [facility]. She was recently hospitalized at [facility] - first visit with cellulitis, while she was there COVID diagnosis and found pulmonary embolism and bacterial pneumonia.    Reason for visit: Comprehensive Medication Review (CMR).    Allergies/ADRs: None  Past Medical History: Reviewed in chart  Tobacco: She reports that she has never smoked. She has never used smokeless tobacco.   Alcohol: not currently using - social drinker, does not even drink monthly.      Medication Adherence/Access: no issues reported    Depression/Anxiety: Current medications include sertraline 100 mg daily. At the moment feeling well - doing therapy every other week - very helpful.     PHQ 4/20/2020 12/3/2020 4/15/2021   PHQ-9 Total Score 6 5 0   Q9: Thoughts of better off dead/self-harm past 2 weeks Not at all Not at all Not at all     Multiple Sclerosis/Pain: Current medications include gabapentin 200 mg three times daily. With MS has involuntary twitches in left leg - originally given gabapentin for that. Also has arthritis in hips and last year had cellulitis - very painful, helps with pain. Tolerating well. MS followed by neurology.     Hypertension/Edema/CAD/History of Pulmonary Embolism: Current medications include bumetanide 2 mg once daily in the morning (nephrologist decreased bumex to once daily - didn't want her to get  dehydrated), lisinopril 10 mg daily, metoprolol XL 25 mg daily and Xarelto 20 mg daily (was taking twice daily - decreased in hospital due to bleeding from stent for kidney stone. Bleeding much less with once daily dosing). Patient reports no current medication side effects. Followed by cardiology - has follow up appointment next week.   BP Readings from Last 3 Encounters:   02/27/22 128/86   02/18/22 122/66   03/08/21 (!) 142/74     Potassium   Date Value Ref Range Status   02/26/2022 3.7 3.5 - 5.0 mmol/L Final   03/07/2021 4.3 3.4 - 5.3 mmol/L Final     Supplements: taking vitamin D3 10,000 units daily - MS doctor recommended higher dose. Vitamin D lab from 3/2017 was wnls.     Yeast Skin Infection: Taking Miconazole powder - using in folds in skin to keep dry and prevent yeast infection.    Kidney stone/UTI/Cellulitis: Has f/u with urology tomorrow for bladder scan - then the following plans: next Friday going to get stent taken out, blast kidney stone, then another stent put in - following Friday get that stent out. Currently taking cefuroxime 500 mg twice daily - was on previously for cellulitis - currently on for both UTI and cellulitis. Cellulitis is doing okay, has wraps - taking off today. Applying for services to get help. Pain is much better. Tolerating cefuroxime well.   ----------------  Post Discharge Medication Reconciliation Status: discharge medications reconciled, continue medications without change.    I spent 30 minutes with this patient today. All changes were made via collaborative practice agreement with Mikala Luna MD. A copy of the visit note was provided to the patient's provider(s).    The patient was sent via [employer] a summary of these recommendations.     Valeria Culver, PharmD  Medication Therapy Management     Telemedicine Visit Details  Type of service:  Telephone visit  Start Time: 11:00 AM  End Time: 11:30 AM  Originating Location (patient location): Home  Distant Location  (provider location):  [facility]     Medication Therapy Recommendations  No medication therapy recommendations to display

## 2022-03-03 ENCOUNTER — MEDICAL CORRESPONDENCE (OUTPATIENT)
Dept: HEALTH INFORMATION MANAGEMENT | Facility: CLINIC | Age: 47
End: 2022-03-03
Payer: COMMERCIAL

## 2022-03-04 NOTE — PATIENT INSTRUCTIONS
Recommendations from today's MTM visit:                                                    MTM (medication therapy management) is a service provided by a clinical pharmacist designed to help you get the most of out of your medicines.   Today we reviewed what your medicines are for, how to know if they are working, that your medicines are safe and how to make your medicine regimen as easy as possible.      Bess,    It was a pleasure talking with you! We discussed the followin. Continue current drug therapy.    Follow-up: 6 months.    It was great to speak with you today.  I value your experience and would be very thankful for your time with providing feedback on our clinic survey. You may receive a survey via email or text message in the next few days.     To schedule another MTM appointment, please call the clinic directly or you may call the MTM scheduling line at [phone] or toll-free at [phone].     My Clinical Pharmacist's contact information:                                                      Please feel free to contact me with any questions or concerns you have.      Keep up the good work!!    Valeria Culver, PharmD  [phone] in clinic on Tuesday and Wednesday

## 2022-03-05 ENCOUNTER — HEALTH MAINTENANCE LETTER (OUTPATIENT)
Age: 47
End: 2022-03-05

## 2022-03-08 ENCOUNTER — LAB (OUTPATIENT)
Dept: LAB | Facility: CLINIC | Age: 47
End: 2022-03-08
Attending: FAMILY MEDICINE
Payer: COMMERCIAL

## 2022-03-08 PROCEDURE — 85018 HEMOGLOBIN: CPT | Mod: ORL | Performed by: FAMILY MEDICINE

## 2022-03-09 ENCOUNTER — OFFICE VISIT (OUTPATIENT)
Dept: CARDIOLOGY | Facility: CLINIC | Age: 47
End: 2022-03-09
Payer: COMMERCIAL

## 2022-03-09 VITALS
RESPIRATION RATE: 16 BRPM | BODY MASS INDEX: 50.02 KG/M2 | SYSTOLIC BLOOD PRESSURE: 104 MMHG | HEART RATE: 68 BPM | DIASTOLIC BLOOD PRESSURE: 76 MMHG | WEIGHT: 293 LBS | HEIGHT: 64 IN

## 2022-03-09 DIAGNOSIS — I10 BENIGN ESSENTIAL HYPERTENSION: Chronic | ICD-10-CM

## 2022-03-09 DIAGNOSIS — G47.33 OSA (OBSTRUCTIVE SLEEP APNEA): Chronic | ICD-10-CM

## 2022-03-09 DIAGNOSIS — I50.33 ACUTE ON CHRONIC DIASTOLIC CONGESTIVE HEART FAILURE (H): ICD-10-CM

## 2022-03-09 DIAGNOSIS — I50.32 CHRONIC HEART FAILURE WITH PRESERVED EJECTION FRACTION (H): Primary | ICD-10-CM

## 2022-03-09 DIAGNOSIS — I26.99 ACUTE PULMONARY EMBOLISM WITHOUT ACUTE COR PULMONALE, UNSPECIFIED PULMONARY EMBOLISM TYPE (H): ICD-10-CM

## 2022-03-09 PROCEDURE — 99214 OFFICE O/P EST MOD 30 MIN: CPT | Performed by: NURSE PRACTITIONER

## 2022-03-09 RX ORDER — BUMETANIDE 2 MG/1
2 TABLET ORAL DAILY
Qty: 90 TABLET | Refills: 1 | Status: ON HOLD | OUTPATIENT
Start: 2022-03-09 | End: 2022-04-12

## 2022-03-09 NOTE — PROGRESS NOTES
Patient seen in clinic for HF education s/p recent hospital discharge 0-00-00.  Reviewed HF Folder that includes the  HF Sx Awareness & Action plan handout and Weight log booklet highlighting :  __X_patient s type of heart failure _X__Na management in diet  __X_importance of daily wts  _X__Fluid Guidelines, if applicable  __X_medication review and importance of compliance     Met today with Bess and her sister Anali. Pt does not currently weigh herself daily because she doesn't have a scale at home. Pt tracks daily fluid intake and keeps fluid under 60 oz per day. Pt also tracks sodium; her mom is staying with her for a while to help her cook but is leaving this week. Pt is unable to cook for herself and does order in food a lot.     Reviewed high sodium foods to avoid and low sodium substitutes. Signed pt up for [employer] meals with expedited delivery.     Got pt signed up for HRS home monitoring; kit ordered today. Discussed expectation of daily weight and metrics and when to call HF RN line. Pt very agreeable to plan.     Instructed patient in signs and sx of heart failure, reiterated when to call clinic - reviewed HF hotline # [phone] and after hours call # [phone].  Majority of time was spent reviewing: Low sodium diet, when to call HF RN line and HRS home monitoring.     Patient verbalized understanding of HF discussion.  Will continue to reinforce HF management education as needed.       Alicia Almonte RN

## 2022-03-09 NOTE — PATIENT INSTRUCTIONS
Bess Enamorado,    It was a pleasure to see you today at [facility].     My recommendations after this visit include:  - Please follow up with Dr Sanchez in 6-8 weeks   - Please follow up with Key Flanagan in 4 months  - Echocardiogram scheduled May 13  - Continue current medications    Key Flanagan CNP      What is the [facility] Heart Failure Program?     The [facility] Heart Failure Program is a heart failure specialty clinic within Heart Care.  You will work with your cardiologist, nurse practitioner, and nurses to carefully adjust medications and learn how to live with heart failure.  The Heart Failure Program will help you:      Better understand your chronic heart condition    Feel better and avoid hospital stays    Monitoring for Symptoms      Call the Heart Failure Phone Line ([phone]) if you have any of these symptoms:     Increased shortness of breath/shortness of breath at rest    Waking up at night with difficulty breathing    Unable to lie down for sleep due to symptoms or needing to sit upright for sleep    Weight gain of 2 pounds a day for 2 days in a row OR 5 pounds in 1 week    Increased swelling in your ankles or legs    Dizziness or lightheadedness    Medications       Take your medications as prescribed    Bring all your medications in their original bottles to every appointment    Avoid non-steroidal anti-inflammatory medications (Advil, Aleve, Ibuprofen, Naprosyn, Naproxen, Celebrex)    Do not stop taking your medications or begin taking over-the-counter or herbal medications without first talking to your doctor or nurse practitioner    Diet and Lifestyle       Limit sodium/salt to 2000 mg daily   o Read food labels for sodium content  o Do not add salt when cooking or add salt at the table    Weigh yourself every day and record in your daily weight log   o Call if you gain 2 pounds a day for 2 days in a row OR 5 pounds in 1 week  o Bring daily  weight log to every appointment    Stay active, pace yourself, listen to your body, and rest when tired    Elevate your legs if they are swollen. Ask about using compression/support stockings    Stop smoking    Lose weight if you are overweight    Avoid drinking alcohol or limit amount    Stay updated on your immunizations including flu and pneumonia vaccines

## 2022-03-09 NOTE — PROGRESS NOTES
Assessment/Recommendations   Assessment:    1.  Heart failure with preserved ejection fraction, NYHA class II: Compensated.  She states her symptoms have improved drastically with hospitalization.  She has mild fatigue, dyspnea on exertion and lymphedema.  She is unable to weigh herself at home.  We discussed HRS monitoring and she is interested.  She normally lives alone and orders out quite a bit.  We discussed [facility] meal [facility] and she is also interested in enrolling today.  She met with the nurse clinician for further education.  2.  Hypertension: Controlled.  Blood pressure 104/76  3.  NARCISA: She wears her CPAP nightly  4.  Pulmonary embolus: She continues Xarelto for anticoagulation    Plan:  1.  Continue current medications  2.  Enroll in HRS and [facility] meal service  3.  Continue Velcro wraps  4.  Echocardiogram scheduled May 13  5.  Continue CPAP    Bess Enamorado will follow up with Dr. Sanchez in 6 to 8 weeks and in the heart failure clinic in 4 months or sooner if needed.     History of Present Illness/Subjective    Ms. Bess Enamorado is a 46 year old female seen at [facility] heart failure clinic today for continued follow-up.  Her Sister Anali accompanies her today.  She follows up for heart failure with preserved ejection fraction.  She was hospitalized February 8 to February 18 due to respiratory failure with COVID-19 and pulmonary embolus.  She required BiPAP on admission.  She was started on a Lasix drip and lost 16 L.  She was started on a heparin drip for her PE and transition to Xarelto.  She had an echocardiogram while hospitalized which showed ejection fraction of 65%.  She was discharged and was readmitted a few days after for an obstructing ureteral stone and UTI with sepsis. CT abdomen showed 8 x 7 x 3 mm stone proximal right ureter causing mild hydronephrosis. Patient received urgent cystoscopy with right retrograde pyelogram and right ureteral stent placement. Per Urology,  "right ureteral stent will be in place for at least 2 weeks, followed by definitive management with ureteroscopic stone extraction with laser lithotripsy outpatient.  She is scheduled for this this coming Friday.  She has a past medical history significant for PE, non-STEMI, hypertension, morbid obesity, COVID-19, lymphedema, NARCISA on CPAP, MS.    Today, she states she is feeling well.  She has mild fatigue and dyspnea on exertion.  Overall her symptoms have improved dramatically since hospitalization.  She has chronic lymphedema and wears Velcro wraps.  She denies lightheadedness, orthopnea, PND, palpitations, chest pain and abdominal fullness/bloating.      She is unable to monitor her weights at home.  She tends to eat out on a regular basis.  She states her mom has been at her home and helping her follow a low-sodium diet but she will be leaving this weekend.    ECHOCARDIOGRAM: 2/9/2022  Interpretation Summary     1.Left ventricular size, wall motion and function are normal. The ejection  fraction is > 65%.  2.Moderately decreased right ventricular systolic function  3.No hemodynamically significant valvular abnormalities on 2D or color flow  imaging.  4.Technically difficult, suboptimal study due to patient size.  There is no comparison study available.     Physical Examination Review of Systems   /76 (BP Location: Right arm, Patient Position: Sitting, Cuff Size: Adult Large)   Pulse 68   Resp 16   Ht 1.626 m (5' 4\")   Wt (!) 158.3 kg (349 lb)   LMP 02/19/2022 (Exact Date)   BMI 59.91 kg/m    Body mass index is 59.91 kg/m .  Wt Readings from Last 3 Encounters:   03/09/22 (!) 158.3 kg (349 lb)   02/26/22 (!) 157.8 kg (347 lb 14.4 oz)   02/18/22 (!) 159.7 kg (352 lb)       General Appearance:   no acute distress, obese   ENT/Mouth: membranes moist, no oral lesions or bleeding gums.      EYES:  no scleral icterus, normal conjunctivae   Neck: no carotid bruits or thyromegaly   Chest/Lungs:   lungs are " "clear to auscultation, no rales or wheezing, equal chest wall expansion    Cardiovascular:   Regular. Normal first and second heart sounds with no murmurs, rubs, or gallops; JVP is difficult to assess due to the patient's obesity and body habitus, lymphedema bilaterally, wearing Velcro wraps   Abdomen:   Obese, no organomegaly, masses, bruits, or tenderness; bowel sounds are present   Extremities: no cyanosis or clubbing   Skin: no xanthelasma, warm.    Neurologic: normal  bilateral, no tremors     Psychiatric: alert and oriented x3    Enc Vitals  BP: 104/76  Pulse: 68  Resp: 16  Weight: (!) 158.3 kg (349 lb)  Height: 162.6 cm (5' 4\")                                         Medical History  Surgical History Family History Social History   Past Medical History:   Diagnosis Date     Acute on chronic diastolic congestive heart failure (H) 2/9/2022     Arthritis 2019    Hips     ASCUS favor benign 8/2015    Neg HPV     Depressive disorder 2010     H/O colposcopy with cervical biopsy 9/14/15    Bx & ECC - negative     Hypertension 2019     LSIL on Pap smear 7/2014    Neg high risk HPV     Multiple sclerosis (H) 8/29/2005 9/18/200pt dx with MS 2004--dx by neurolgist and is followed by Dr. Steven Ayala 10/2016     UNSPEC CONSTIPATION 9/18/2006 9/18/2006   Has tried otc suppository and otc lax.     Past Surgical History:   Procedure Laterality Date     CHOLECYSTECTOMY, LAPOROSCOPIC      Cholecystectomy, Laparoscopic     COLONOSCOPY  2007?    Bathroom issue..results normal     COMBINED CYSTOSCOPY, RETROGRADES, EXCHANGE STENT URETER(S) Right 2/21/2022    Procedure: CYSTOSCOPY, WITH right RETROGRADE PYELOGRAM AND right URETERAL STENT PLACEMENT;  Surgeon: Doyle Palomares MD;  Location: [facility] OR     OPEN REDUCTION INTERNAL FIXATION TOE(S)  4/13/2012    Procedure:OPEN REDUCTION INTERNAL FIXATION TOE(S); Open reduction internal fixation right proximal fifth metatarsal fracture-   Anes-choice block; " Surgeon:LEY, JEFFREY DUANE; Location:[place] OR     PICC TRIPLE LUMEN PLACEMENT  2/21/2022         Family History   Problem Relation Age of Onset     Neurologic Disorder Father         MS     Heart Disease Father         irregular heart rate     Diabetes Father      Melanoma Father      Sleep Apnea Father      Other Cancer Father      Cancer Maternal Grandfather         lung     Other Cancer Maternal Grandfather      Cancer Paternal Grandmother         stomach     Other Cancer Paternal Grandmother      Cancer Mother      Other Cancer Mother      Thyroid Disease Mother      Gynecology Maternal Aunt         PCOS     Hypertension Maternal Grandmother      Anxiety Disorder Maternal Grandmother      Thyroid Disease Maternal Grandmother      Anxiety Disorder Sister     Social History     Socioeconomic History     Marital status: Single     Spouse name: Not on file     Number of children: Not on file     Years of education: Not on file     Highest education level: Not on file   Occupational History     Employer: [employer]   Tobacco Use     Smoking status: Never Smoker     Smokeless tobacco: Never Used   Vaping Use     Vaping Use: Never used   Substance and Sexual Activity     Alcohol use: Yes     Comment: social     Drug use: No     Sexual activity: Not Currently     Partners: Male     Birth control/protection: Pill   Other Topics Concern     Parent/sibling w/ CABG, MI or angioplasty before 65F 55M? No   Social History Narrative    , 3rd-5th grade     Social Determinants of Health     Financial Resource Strain: Low Risk      Difficulty of Paying Living Expenses: Not very hard   Food Insecurity: No Food Insecurity     Worried About Running Out of Food in the Last Year: Never true     Ran Out of Food in the Last Year: Never true   Transportation Needs: No Transportation Needs     Lack of Transportation (Medical): No     Lack of Transportation (Non-Medical): No   Physical Activity: Inactive     Days  of Exercise per Week: 0 days     Minutes of Exercise per Session: 0 min   Stress: Not on file   Social Connections: Unknown     Frequency of Communication with Friends and Family: Twice a week     Frequency of Social Gatherings with Friends and Family: Twice a week     Attends Alevism Services: Not on file     Active Member of Clubs or Organizations: Not on file     Attends Club or Organization Meetings: Not on file     Marital Status: Not on file   Intimate Partner Violence: Not At Risk     Fear of Current or Ex-Partner: No     Emotionally Abused: No     Physically Abused: No     Sexually Abused: No   Housing Stability: Not on file          Medications  Allergies   Current Outpatient Medications   Medication Sig Dispense Refill     acetaminophen (TYLENOL) 325 MG tablet Take 650 mg by mouth every 6 hours as needed for mild pain        bumetanide (BUMEX) 2 MG tablet Take 1 tablet (2 mg) by mouth daily 90 tablet 1     Cholecalciferol (VITAMIN D3 PO) Take 10,000 Units by mouth daily        gabapentin (NEURONTIN) 100 MG capsule TAKE 2 CAPSULES(200 MG) BY MOUTH THREE TIMES DAILY 540 capsule 1     lisinopril (ZESTRIL) 10 MG tablet Take 1 tablet (10 mg) by mouth daily 30 tablet 0     metoprolol succinate ER (TOPROL-XL) 25 MG 24 hr tablet TAKE 1 TABLET(25 MG) BY MOUTH DAILY 90 tablet 1     miconazole (MICATIN) 2 % external powder Apply topically 2 times daily       rivaroxaban ANTICOAGULANT (XARELTO) 20 MG TABS tablet Take 1 tablet (20 mg) by mouth daily (with dinner) 90 tablet 0     sertraline (ZOLOFT) 100 MG tablet Take 1 tablet (100 mg) by mouth daily 90 tablet 3     desogestrel-ethinyl estradiol (APRI) 0.15-30 MG-MCG tablet TAKE 1 TABLET DAILY CONTINUOUSLY-OMIT PLACEBPO TABS UNTIL AFTER 3 MONTHS OF HORMONE TABS (Patient not taking: Reported on 3/2/2022) 112 tablet 3    Allergies   Allergen Reactions     Nkda [No Known Drug Allergies]          Lab Results    Chemistry/lipid CBC Cardiac Enzymes/BNP/TSH/INR   Lab Results    Component Value Date    CHOL 162 02/11/2016    HDL 64 02/11/2016    TRIG 113 02/11/2016    BUN 12 02/26/2022     02/26/2022    CO2 27 02/26/2022    Lab Results   Component Value Date    WBC 5.5 02/27/2022    HGB 14.6 03/08/2022    HCT 42.4 02/27/2022    MCV 87 02/27/2022     02/27/2022    Lab Results   Component Value Date    TROPONINI 0.05 02/20/2022    BNP 64 02/20/2022    TSH 2.81 01/09/2012    INR 1.23 (H) 02/11/2022             This note has been dictated using voice recognition software. Any grammatical, typographical, or context distortions are unintentional and inherent to the software    30 minutes spent on the date of encounter doing chart review, review of outside records, review of test results, interpretation with above tests, patient visit, documentation and discussion with family.

## 2022-03-09 NOTE — LETTER
3/9/2022    Mikala Luna MD  [address]  [facility] [address]    RE: Bess Enamorado       Dear Colleague,     I had the pleasure of seeing Bess Enamorado in the [facility].        Assessment/Recommendations   Assessment:    1.  Heart failure with preserved ejection fraction, NYHA class II: Compensated.  She states her symptoms have improved drastically with hospitalization.  She has mild fatigue, dyspnea on exertion and lymphedema.  She is unable to weigh herself at home.  We discussed HRS monitoring and she is interested.  She normally lives alone and orders out quite a bit.  We discussed [facility] meal service and she is also interested in enrolling today.  She met with the nurse clinician for further education.  2.  Hypertension: Controlled.  Blood pressure 104/76  3.  NARCISA: She wears her CPAP nightly  4.  Pulmonary embolus: She continues Xarelto for anticoagulation    Plan:  1.  Continue current medications  2.  Enroll in HRS and [facility] meal service  3.  Continue Velcro wraps  4.  Echocardiogram scheduled May 13  5.  Continue CPAP    Bess Enamorado will follow up with Dr. Sanchez in 6 to 8 weeks and in the heart failure clinic in 4 months or sooner if needed.     History of Present Illness/Subjective    Ms. Bess Enamorado is a 46 year old female seen at [facility] heart failure clinic today for continued follow-up.  Her Sister Anali accompanies her today.  She follows up for heart failure with preserved ejection fraction.  She was hospitalized February 8 to February 18 due to respiratory failure with COVID-19 and pulmonary embolus.  She required BiPAP on admission.  She was started on a Lasix drip and lost 16 L.  She was started on a heparin drip for her PE and transition to Xarelto.  She had an echocardiogram while hospitalized which showed ejection fraction of 65%.  She was discharged and was readmitted a few days after for an obstructing ureteral stone and UTI with sepsis. CT  "abdomen showed 8 x 7 x 3 mm stone proximal right ureter causing mild hydronephrosis. Patient received urgent cystoscopy with right retrograde pyelogram and right ureteral stent placement. Per Urology, right ureteral stent will be in place for at least 2 weeks, followed by definitive management with ureteroscopic stone extraction with laser lithotripsy outpatient.  She is scheduled for this this coming Friday.  She has a past medical history significant for PE, non-STEMI, hypertension, morbid obesity, COVID-19, lymphedema, NARCISA on CPAP, MS.    Today, she states she is feeling well.  She has mild fatigue and dyspnea on exertion.  Overall her symptoms have improved dramatically since hospitalization.  She has chronic lymphedema and wears Velcro wraps.  She denies lightheadedness, orthopnea, PND, palpitations, chest pain and abdominal fullness/bloating.      She is unable to monitor her weights at home.  She tends to eat out on a regular basis.  She states her mom has been at her home and helping her follow a low-sodium diet but she will be leaving this weekend.    ECHOCARDIOGRAM: 2/9/2022  Interpretation Summary     1.Left ventricular size, wall motion and function are normal. The ejection  fraction is > 65%.  2.Moderately decreased right ventricular systolic function  3.No hemodynamically significant valvular abnormalities on 2D or color flow  imaging.  4.Technically difficult, suboptimal study due to patient size.  There is no comparison study available.     Physical Examination Review of Systems   /76 (BP Location: Right arm, Patient Position: Sitting, Cuff Size: Adult Large)   Pulse 68   Resp 16   Ht 1.626 m (5' 4\")   Wt (!) 158.3 kg (349 lb)   LMP 02/19/2022 (Exact Date)   BMI 59.91 kg/m    Body mass index is 59.91 kg/m .  Wt Readings from Last 3 Encounters:   03/09/22 (!) 158.3 kg (349 lb)   02/26/22 (!) 157.8 kg (347 lb 14.4 oz)   02/18/22 (!) 159.7 kg (352 lb)       General Appearance:   no acute " "distress, obese   ENT/Mouth: membranes moist, no oral lesions or bleeding gums.      EYES:  no scleral icterus, normal conjunctivae   Neck: no carotid bruits or thyromegaly   Chest/Lungs:   lungs are clear to auscultation, no rales or wheezing, equal chest wall expansion    Cardiovascular:   Regular. Normal first and second heart sounds with no murmurs, rubs, or gallops; JVP is difficult to assess due to the patient's obesity and body habitus, lymphedema bilaterally, wearing Velcro wraps   Abdomen:   Obese, no organomegaly, masses, bruits, or tenderness; bowel sounds are present   Extremities: no cyanosis or clubbing   Skin: no xanthelasma, warm.    Neurologic: normal  bilateral, no tremors     Psychiatric: alert and oriented x3    Enc Vitals  BP: 104/76  Pulse: 68  Resp: 16  Weight: (!) 158.3 kg (349 lb)  Height: 162.6 cm (5' 4\")                                         Medical History  Surgical History Family History Social History   Past Medical History:   Diagnosis Date     Acute on chronic diastolic congestive heart failure (H) 2/9/2022     Arthritis 2019    Hips     ASCUS favor benign 8/2015    Neg HPV     Depressive disorder 2010     H/O colposcopy with cervical biopsy 9/14/15    Bx & ECC - negative     Hypertension 2019     LSIL on Pap smear 7/2014    Neg high risk HPV     Multiple sclerosis (H) 8/29/2005 9/18/200pt dx with MS 2004--dx by neurolgist and is followed by Dr. Steven Ayala 10/2016     UNSPEC CONSTIPATION 9/18/2006 9/18/2006   Has tried otc suppository and otc lax.     Past Surgical History:   Procedure Laterality Date     CHOLECYSTECTOMY, LAPOROSCOPIC      Cholecystectomy, Laparoscopic     COLONOSCOPY  2007?    Bathroom issue..results normal     COMBINED CYSTOSCOPY, RETROGRADES, EXCHANGE STENT URETER(S) Right 2/21/2022    Procedure: CYSTOSCOPY, WITH right RETROGRADE PYELOGRAM AND right URETERAL STENT PLACEMENT;  Surgeon: Doyle Palomares MD;  Location: [facility] OR     [facility]" REDUCTION INTERNAL FIXATION TOE(S)  4/13/2012    Procedure:OPEN REDUCTION INTERNAL FIXATION TOE(S); Open reduction internal fixation right proximal fifth metatarsal fracture-   Anes-choice block; Surgeon:LEY, JEFFREY DUANE; Location:[place] OR     PICC TRIPLE LUMEN PLACEMENT  2/21/2022         Family History   Problem Relation Age of Onset     Neurologic Disorder Father         MS     Heart Disease Father         irregular heart rate     Diabetes Father      Melanoma Father      Sleep Apnea Father      Other Cancer Father      Cancer Maternal Grandfather         lung     Other Cancer Maternal Grandfather      Cancer Paternal Grandmother         stomach     Other Cancer Paternal Grandmother      Cancer Mother      Other Cancer Mother      Thyroid Disease Mother      Gynecology Maternal Aunt         PCOS     Hypertension Maternal Grandmother      Anxiety Disorder Maternal Grandmother      Thyroid Disease Maternal Grandmother      Anxiety Disorder Sister     Social History     Socioeconomic History     Marital status: Single     Spouse name: Not on file     Number of children: Not on file     Years of education: Not on file     Highest education level: Not on file   Occupational History     Employer: [employer]   Tobacco Use     Smoking status: Never Smoker     Smokeless tobacco: Never Used   Vaping Use     Vaping Use: Never used   Substance and Sexual Activity     Alcohol use: Yes     Comment: social     Drug use: No     Sexual activity: Not Currently     Partners: Male     Birth control/protection: Pill   Other Topics Concern     Parent/sibling w/ CABG, MI or angioplasty before 65F 55M? No   Social History Narrative    , 3rd-5th grade     Social Determinants of Health     Financial Resource Strain: Low Risk      Difficulty of Paying Living Expenses: Not very hard   Food Insecurity: No Food Insecurity     Worried About Running Out of Food in the Last Year: Never true     Ran Out of Food in the  Last Year: Never true   Transportation Needs: No Transportation Needs     Lack of Transportation (Medical): No     Lack of Transportation (Non-Medical): No   Physical Activity: Inactive     Days of Exercise per Week: 0 days     Minutes of Exercise per Session: 0 min   Stress: Not on file   Social Connections: Unknown     Frequency of Communication with Friends and Family: Twice a week     Frequency of Social Gatherings with Friends and Family: Twice a week     Attends Evangelical Services: Not on file     Active Member of Clubs or Organizations: Not on file     Attends Club or Organization Meetings: Not on file     Marital Status: Not on file   Intimate Partner Violence: Not At Risk     Fear of Current or Ex-Partner: No     Emotionally Abused: No     Physically Abused: No     Sexually Abused: No   Housing Stability: Not on file          Medications  Allergies   Current Outpatient Medications   Medication Sig Dispense Refill     acetaminophen (TYLENOL) 325 MG tablet Take 650 mg by mouth every 6 hours as needed for mild pain        bumetanide (BUMEX) 2 MG tablet Take 1 tablet (2 mg) by mouth daily 90 tablet 1     Cholecalciferol (VITAMIN D3 PO) Take 10,000 Units by mouth daily        gabapentin (NEURONTIN) 100 MG capsule TAKE 2 CAPSULES(200 MG) BY MOUTH THREE TIMES DAILY 540 capsule 1     lisinopril (ZESTRIL) 10 MG tablet Take 1 tablet (10 mg) by mouth daily 30 tablet 0     metoprolol succinate ER (TOPROL-XL) 25 MG 24 hr tablet TAKE 1 TABLET(25 MG) BY MOUTH DAILY 90 tablet 1     miconazole (MICATIN) 2 % external powder Apply topically 2 times daily       rivaroxaban ANTICOAGULANT (XARELTO) 20 MG TABS tablet Take 1 tablet (20 mg) by mouth daily (with dinner) 90 tablet 0     sertraline (ZOLOFT) 100 MG tablet Take 1 tablet (100 mg) by mouth daily 90 tablet 3     desogestrel-ethinyl estradiol (APRI) 0.15-30 MG-MCG tablet TAKE 1 TABLET DAILY CONTINUOUSLY-OMIT PLACEBPO TABS UNTIL AFTER 3 MONTHS OF HORMONE TABS (Patient not  taking: Reported on 3/2/2022) 112 tablet 3    Allergies   Allergen Reactions     Nkda [No Known Drug Allergies]          Lab Results    Chemistry/lipid CBC Cardiac Enzymes/BNP/TSH/INR   Lab Results   Component Value Date    CHOL 162 02/11/2016    HDL 64 02/11/2016    TRIG 113 02/11/2016    BUN 12 02/26/2022     02/26/2022    CO2 27 02/26/2022    Lab Results   Component Value Date    WBC 5.5 02/27/2022    HGB 14.6 03/08/2022    HCT 42.4 02/27/2022    MCV 87 02/27/2022     02/27/2022    Lab Results   Component Value Date    TROPONINI 0.05 02/20/2022    BNP 64 02/20/2022    TSH 2.81 01/09/2012    INR 1.23 (H) 02/11/2022             This note has been dictated using voice recognition software. Any grammatical, typographical, or context distortions are unintentional and inherent to the software    30 minutes spent on the date of encounter doing chart review, review of outside records, review of test results, interpretation with above tests, patient visit, documentation and discussion with family.                Patient seen in clinic for HF education s/p recent hospital discharge 0-00-00.  Reviewed HF Folder that includes the  HF Sx Awareness & Action plan handout and Weight log booklet highlighting :  __X_patient s type of heart failure _X__Na management in diet  __X_importance of daily wts  _X__Fluid Guidelines, if applicable  __X_medication review and importance of compliance     Met today with Bess and her sister Anali. Pt does not currently weigh herself daily because she doesn't have a scale at home. Pt tracks daily fluid intake and keeps fluid under 60 oz per day. Pt also tracks sodium; her mom is staying with her for a while to help her cook but is leaving this week. Pt is unable to cook for herself and does order in food a lot.     Reviewed high sodium foods to avoid and low sodium substitutes. Signed pt up for [facility] meals with expedited delivery.     Got pt signed up for HRS home monitoring;  kit ordered today. Discussed expectation of daily weight and metrics and when to call HF RN line. Pt very agreeable to plan.     Instructed patient in signs and sx of heart failure, reiterated when to call clinic - reviewed HF hotline # [phone] and after hours call # [phone].  Majority of time was spent reviewing: Low sodium diet, when to call HF RN line and HRS home monitoring.     Patient verbalized understanding of HF discussion.  Will continue to reinforce HF management education as needed.       Alicia Almonte RN    Thank you for allowing me to participate in the care of your patient.      Sincerely,     JULIETA Herbert [facility]  cc:   Raúl Sanchez MD  [address] [place] [id number]

## 2022-03-15 ENCOUNTER — TELEPHONE (OUTPATIENT)
Dept: FAMILY MEDICINE | Facility: CLINIC | Age: 47
End: 2022-03-15
Payer: COMMERCIAL

## 2022-03-15 NOTE — TELEPHONE ENCOUNTER
Routing to Provider to review and approve.     Please review the requested home care orders below and route back to team.     Summer Lizarraga RN BSN

## 2022-03-15 NOTE — TELEPHONE ENCOUNTER
Reason for Call:  Home Health Care    Amid  with [facility] called regarding (reason for call): Verbal order    Orders are needed for this patient.      Skilled Nursing: Pt is transferring care from  [facility] to [facility].  They are requesting verbal orders for Skilled RN assessment, evaluation and treat    Pt Provider: Mikala Luna    Phone Number Homecare Nurse can be reached at: [phone]  Orders can be given to any staff member.     Can we leave a detailed message on this number? YES     Call taken on 3/15/2022 at 12:08 PM by Lena Baker

## 2022-03-18 ENCOUNTER — TELEPHONE (OUTPATIENT)
Dept: FAMILY MEDICINE | Facility: CLINIC | Age: 47
End: 2022-03-18
Payer: COMMERCIAL

## 2022-03-18 ENCOUNTER — TRANSFERRED RECORDS (OUTPATIENT)
Dept: HEALTH INFORMATION MANAGEMENT | Facility: CLINIC | Age: 47
End: 2022-03-18
Payer: COMMERCIAL

## 2022-03-18 ENCOUNTER — TELEPHONE (OUTPATIENT)
Dept: CARDIOLOGY | Facility: CLINIC | Age: 47
End: 2022-03-18
Payer: COMMERCIAL

## 2022-03-18 DIAGNOSIS — I50.32 CHRONIC DIASTOLIC CONGESTIVE HEART FAILURE (H): Primary | ICD-10-CM

## 2022-03-18 NOTE — TELEPHONE ENCOUNTER
Bess was contacted with wt gain today of 3# she notes increased swelling in her ankles, legs and feet.  Denies dizziness, cough, lightheadedness or chest pain.  She has been a little more short of breath with activity and taking more intermittent breaks to deal with this.    Bess is using Bumex 2mg daily dose for current regimen.    Key Flanagan,CANDICE any new recommendations at this time?    Alexandra Mukherjee RN BSN, CHFN

## 2022-03-18 NOTE — TELEPHONE ENCOUNTER
Reason for Call:  Home Health Care    Haven with [facility] called regarding   Orders are needed for this patient.  delay in nursing  request per family to 3/20 (Sunday)  Pt Provider: Jeremy    Phone Number Homecare Nurse can be reached at: [phone]    Can we leave a detailed message on this number? YES  Best Time: any    Call taken on 3/18/2022 at 11:41 AM by Cyndie Hercules

## 2022-03-18 NOTE — TELEPHONE ENCOUNTER
Please increase Bumex to 2 mg twice a day for 2 days then continue 2 mg daily thereafter.  Thank you

## 2022-03-18 NOTE — TELEPHONE ENCOUNTER
Bess is contacted with recommendations to increase Bumex up to 2mg bid x2 days then resume 2mg daily thereafter. She was advised that the CORE RN will call her on Monday to review her response to this change.    Alexandra Mukherjee RN BSN, CHFN

## 2022-03-19 NOTE — TELEPHONE ENCOUNTER
[facility] RN Bebe calling back for delay of home care orders, states [facility] has called twice without a response back.      RN gave Bebe the information below, delay approved.  She verbalized understanding and had no further questions.     Lisa Gale RN  [facility] Nurse Advisor

## 2022-03-21 ENCOUNTER — MEDICAL CORRESPONDENCE (OUTPATIENT)
Dept: HEALTH INFORMATION MANAGEMENT | Facility: CLINIC | Age: 47
End: 2022-03-21
Payer: COMMERCIAL

## 2022-03-21 NOTE — TELEPHONE ENCOUNTER
Called Bess and relayed Key's recommendation to continue on increased Bumex 2 mg BID. Pt verbalized understanding.      Pt will have labs drawn at  walk in lab next Tuesday or Wednesday. BMP order placed.     Will continue to monitor pt via HRS.     HRS time: 10 minutes.     Alicia Almonte RN

## 2022-03-21 NOTE — TELEPHONE ENCOUNTER
"Called Bess to Follow-up on how she is feeling after increasing Bumex 2 mg BID x 2 days.     Noted on HRS that pt weight is only down 0.5#.     Pt states that she is feeling well; denies any SOB, abdominal distention or difficulty sleeping. Pt does report that her thighs feel more swollen and \"harder\" than usual.     Pt is tracking sodium and stating under 2000 mg; pt is having a hard time drinking enough fluid, only getting about 40-50 oz daily.     Pt currently taking Bumex 2 mg daily.     Routing to Key to review and advise.     Alicia Almonte RN    "

## 2022-03-22 ENCOUNTER — E-VISIT (OUTPATIENT)
Dept: FAMILY MEDICINE | Facility: CLINIC | Age: 47
End: 2022-03-22
Payer: COMMERCIAL

## 2022-03-22 DIAGNOSIS — R05.9 COUGH: Primary | ICD-10-CM

## 2022-03-22 PROCEDURE — 99421 OL DIG E/M SVC 5-10 MIN: CPT | Performed by: FAMILY MEDICINE

## 2022-03-23 ENCOUNTER — HOSPITAL ENCOUNTER (OUTPATIENT)
Dept: RADIOLOGY | Facility: HOSPITAL | Age: 47
Discharge: HOME | End: 2022-03-23
Payer: COMMERCIAL

## 2022-03-23 ENCOUNTER — HOSPITAL ENCOUNTER (OUTPATIENT)
Dept: RADIOLOGY | Facility: HOSPITAL | Age: 47
Discharge: HOME | End: 2022-03-23
Attending: FAMILY MEDICINE
Payer: COMMERCIAL

## 2022-03-23 DIAGNOSIS — N20.1 CALCULUS OF URETER: ICD-10-CM

## 2022-03-23 DIAGNOSIS — R05.9 COUGH: ICD-10-CM

## 2022-03-23 PROCEDURE — 71046 X-RAY EXAM CHEST 2 VIEWS: CPT

## 2022-03-23 PROCEDURE — 74019 RADEX ABDOMEN 2 VIEWS: CPT

## 2022-03-24 ENCOUNTER — TELEPHONE (OUTPATIENT)
Dept: CARDIOLOGY | Facility: CLINIC | Age: 47
End: 2022-03-24

## 2022-03-24 ENCOUNTER — TELEPHONE (OUTPATIENT)
Dept: FAMILY MEDICINE | Facility: CLINIC | Age: 47
End: 2022-03-24

## 2022-03-24 DIAGNOSIS — Z53.9 DIAGNOSIS NOT YET DEFINED: Primary | ICD-10-CM

## 2022-03-24 PROCEDURE — G0180 MD CERTIFICATION HHA PATIENT: HCPCS | Performed by: FAMILY MEDICINE

## 2022-03-24 NOTE — TELEPHONE ENCOUNTER
Order are needed for    OT beginning week of 3/27  1 time a week for 4 weeks    FYI--    Also patient Blood pressure today was 80/49  No signs or symptoms of low blood pressure  Being monitored daily    Patient declined HHA this week for bathing    Call Yesy [phone]  confidential

## 2022-03-24 NOTE — TELEPHONE ENCOUNTER
Received call from [facility] RN Jsesica.   She has orders for lymphedema wraps to BLE and expressed concern about applying wraps with patients HF diagnosis as it can increase pre-load.     Jessica is requesting verbal Okay from Key before she starts lymphedema wraps.     Writer also noted on HRS that pts BP have been running low 80-90's/50's. Pt denies any lightheadedness or dizziness at rest or when changing position.     Pt currently taking Lisinopril 10 mg daily and Metoprolol 25 mg BID prescribed by PCP. Advised pt to reach out to PCP to discuss BP medications. Pt verbalized understanding.     Routing to Key to review.     Alicia Almonte RN

## 2022-03-24 NOTE — TELEPHONE ENCOUNTER
RN unable to approve OT request below as OT has not been ordered before. Approval for orders below?    Summer Lizarraga RN BSN

## 2022-03-24 NOTE — TELEPHONE ENCOUNTER
RN called and gave verbal Okay for requested orders below per Providers approval.    Summer Lizarraga RN BSN

## 2022-03-25 ENCOUNTER — TELEPHONE (OUTPATIENT)
Dept: FAMILY MEDICINE | Facility: CLINIC | Age: 47
End: 2022-03-25
Payer: COMMERCIAL

## 2022-03-25 NOTE — TELEPHONE ENCOUNTER
Reason for Call: Request for an order or referral:    Order or referral being requested: Jewels calling from [facility] requesting verbal orders to delay PT eval until 3/28 per patient request, please advise on verbal orders.    Phone number Patient can be reached at:  Other phone number:  Jewels [phone]*    Best Time:  any    Can we leave a detailed message on this number?  YES ok to leave verbal orders on confidential voicemail.     Call taken on 3/25/2022 at 3:27 PM by Loan Curtis

## 2022-03-28 ENCOUNTER — TELEPHONE (OUTPATIENT)
Dept: FAMILY MEDICINE | Facility: CLINIC | Age: 47
End: 2022-03-28
Payer: COMMERCIAL

## 2022-03-28 DIAGNOSIS — I50.33 ACUTE ON CHRONIC DIASTOLIC CONGESTIVE HEART FAILURE (H): ICD-10-CM

## 2022-03-28 NOTE — TELEPHONE ENCOUNTER
Called Jessica ARGUELLES from [facility]; gave her verbal okay for lymphadema wraps from Key.     Reviewed HRS BP's from over the weekend with Jessica; they continue to be low 76/53. Writer has spoken to pt last week about lower BP's and to please Follow-up with her PCP who prescribed them.     Jessica ARGUELLES to see pt today at 3pm; she will check her BP again, evaluate her and make sure she follows up with PCP on lower Bps.     Alicia Almonte RN

## 2022-03-28 NOTE — TELEPHONE ENCOUNTER
RN called and gave verbal approval for the orders requested below. Per the Home Care, Assisted Living or Nursing Home Evaluations and treatments (Including After Hours)-[facility] RN able to approve the requested orders.     Approval given to Jewels villa PT.    Summer Lizarraga RN BSN

## 2022-03-28 NOTE — TELEPHONE ENCOUNTER
Jessica ARGUELLES from [facility] called as pt has had low blood pressure since her first hospitalization 2/8.2022. Blood pressures have been like this for 6 weeks, pt is not  lightheaded or dizzy, Pt gets around in a wheelchair, is not lightheaded when transferring.  She is on diuretic and is urinating frequently. Cardiology is aware of low BP, they want Dr Luna to address pt medications.   3-22  82/50      HR 87      71/49  HR87  3-24  89/53      HR 94  3-25  91/52      HR  111  3-26   76/50     HR  88  3-27   76/53     HR 82  3-28   82/45     HR  82  Christie Farah RN

## 2022-03-28 NOTE — TELEPHONE ENCOUNTER
Reason for Call:  Home Health Care    Jessica with [facility] called regarding (reason for call): Concerns about low BP   3-22  82/50      HR 87      71/49  HR87  3-24  89/53 HR 94  3-25  91/52      HR  111  3-26   76/50 HR  88  3-27   76/53 HR 82  3-28   82/45  HR  82    Patient is on 2 different BP medications lisinopril 10 mg a day Metoprolol 25 mg a day. RN did talk with Cardiology and they said to talk with PCP that she prescribed them. Patient is asymptomatic.     Phone Number Homecare Nurse can be reached at: [phone]    Can we leave a detailed message on this number? YES    Phone number patient can be reached at: Home number on file [phone] (home)    Best Time: any    Call taken on 3/28/2022 at 4:46 PM by Araseli Mo

## 2022-03-29 ENCOUNTER — TELEPHONE (OUTPATIENT)
Dept: CARDIOLOGY | Facility: CLINIC | Age: 47
End: 2022-03-29
Payer: COMMERCIAL

## 2022-03-29 RX ORDER — LISINOPRIL 10 MG/1
5 TABLET ORAL DAILY
Qty: 30 TABLET | Refills: 0 | COMMUNITY
Start: 2022-03-29 | End: 2022-04-01

## 2022-03-29 NOTE — TELEPHONE ENCOUNTER
Called Bess; she denies any lightheadedness, dizziness when sitting or changing positions/transferring from her wheelchair.     Have talked to pt twice before about her low BP's; she has previously denied any lightheadedness or dizziness, as she did yesterday for home health RN Jessica.     No HRS metrics yet today; asked pt to transmit when she can so we can review her metrics.     Writer had advised pt on 3-24-22 to Follow-up with PCP to discuss BP's as her PCP originally prescribed BP meds. Advised her again today, to schedule an in person appt with PCP. Pt agreeable and will schedule.     Confirmed that she is currently taking Lisinopril 10 mg daily, metoprolol 25 mg daily and Bumex 2 mg daily.     Routing to Key to review.     HRS time: 5 minutes.    Alicia Almonte RN

## 2022-03-29 NOTE — TELEPHONE ENCOUNTER
Called pt and relayed Key's recommendation to decrease Lisinopril to 5 mg daily. Epic updated.  Reiterated again that pt needs to Follow-up in person with PCP. Pt verbalized understanding.     Will continue to monitor BP's via HRS.     HRS time 5 minutes.     Alicia Almonte RN

## 2022-03-29 NOTE — TELEPHONE ENCOUNTER
patient is being seen/monitored by cardiology  - see note from 3/9/2022 visit with cardiology.    I will route this to the Cardiology NP who saw her last.   Appears her home blood pressure numbers (below) are significantly lower than they have been in clinic.      Unfortunately other than a video visit, I have not seen Bess in person in >2 years.    BP Readings from Last 6 Encounters:   03/09/22 104/76   02/27/22 128/86   02/18/22 122/66   03/08/21 (!) 142/74   02/27/20 130/73   02/13/20 120/76         Mikala Luna M.D.

## 2022-03-29 NOTE — TELEPHONE ENCOUNTER
Please have her decrease lisinopril to 5 mg daily and follow-up with her PMD in person.  She has not seen her PMD in the clinic for over 2 years.  Thank you

## 2022-03-29 NOTE — TELEPHONE ENCOUNTER
Will you please call patient and see how she is feeling, may need to adjust medications due to hypotension, thank you

## 2022-03-30 ENCOUNTER — TELEPHONE (OUTPATIENT)
Dept: FAMILY MEDICINE | Facility: CLINIC | Age: 47
End: 2022-03-30
Payer: COMMERCIAL

## 2022-03-30 ENCOUNTER — TELEPHONE (OUTPATIENT)
Dept: CARDIOLOGY | Facility: CLINIC | Age: 47
End: 2022-03-30
Payer: COMMERCIAL

## 2022-03-30 NOTE — TELEPHONE ENCOUNTER
Reason for Call:  Home Health Care    [facility] for [facility] Please call with verbal orders- OK to leave message     Orders are needed for this patient.     PT: 1x a week for 3 weeks for strengthening nad balance    Pt Provider: Jeremy    Phone Number Homecare Nurse can be reached at: [phone]    Can we leave a detailed message on this number? YES    Phone number patient can be reached at: Home number on file [phone] (home)    Best Time: any    Call taken on 3/30/2022 at 9:09 AM by Jewels Stevens

## 2022-03-30 NOTE — TELEPHONE ENCOUNTER
Bess called back; she states that she is feeling well. Continues to deny any lightheadedness or dizziness with low BP. Advised pt if she does develop any of these symptoms to please call HF RN line. Pt verbalized understanding.     Pt states she has not yet made an appt with PCP. Advised her to please do this ASAP; pt states she will call today.     Confirmed that pt took reduced dose of Lisinopril 5 mg yesterday.     Key any further recommendations?     HRS time 5 minutes.     Alicia Almonte RN

## 2022-03-30 NOTE — TELEPHONE ENCOUNTER
Reason for call:  Patient reporting a symptom    Symptom or request: [facility] RN calling with report that pt's BP today is 76/52.  No symptoms.  Please call Zora and advise.      Duration (how long have symptoms been present): ongoing    Have you been treated for this before? Yes    Additional comments:     Phone Number Zora  can be reached at:  Other phone number:  [phone]    Best Time:  any    Can we leave a detailed message on this number:  YES    Call taken on 3/30/2022 at 12:20 PM by Jewels Stevens

## 2022-03-30 NOTE — TELEPHONE ENCOUNTER
RN called and left VM on confidential line with approval orders for the requested below orders. RN able to approve per the Home Care, Assisted Living or Nursing Home Evaluations and treatments (Including After Hours)-[facility] Policy.    Routing to Provider for DEBRA Lizarraga RN BSN PHN

## 2022-03-30 NOTE — TELEPHONE ENCOUNTER
See multiple phone calls from here/cardiology on 3/28.        Cardiology/HRS is supposed to be managing this, they decreased her lisinopril to 5 mg on 3/28.     Is pt symptomatic?  Has she felt ill/feverish, any signs of new infection?    Have they contacted the cardiology nurse?    Again, I have not seen patient in clinic in >2 years, very hard to be managing her blood pressure/heart failure medications.    Mikala Luna M.D.

## 2022-03-30 NOTE — TELEPHONE ENCOUNTER
Noted on HRS that pt BP today is 71/46 HR 98. Pt also reported via survey increased SOB and fatigue in the last 24 hours.     Pt Lisinopril reduced to 5 mg daily starting yesterday 3-29-22. Noted that pt has not made a Follow-up appt yet with her PCP Dr. Luna as requested several times by writer to Follow-up on her low BP's.     Called Bess and left VM to please call back clinic to discuss how she is feeling.     HRS time 5 minutes.     Alicia Almonte RN

## 2022-03-30 NOTE — TELEPHONE ENCOUNTER
This writer attempted to contact Zora with [facility] on 03/30/22      Reason for call  Follow-up to Zora regarding Providers message below and Left VM to give a return call to the nurses at [phone].      If patient calls back:   Relay message from Provider below, (read verbatim), document that pt called and close encounter        Summer Lizarraga RN BSN PHN

## 2022-03-30 NOTE — TELEPHONE ENCOUNTER
Zora called back and Relayed the information below from the Provider. Zora understood. But needing to update PCP of her hypotension. Bess is asymptomatic.    Zora stated that anytime a reading is completed that it gets uploaded to patients Ipad and immediately sends off to the cardiologist.     RN called Bess and helped her schedule an annual wellness appointment with Dr. Luna for May 11th at 2:20pm.     Routing to Provider for FYI, please sign and close upon review.    Summer Lizarraga RN BSN PHN

## 2022-03-31 ENCOUNTER — TELEPHONE (OUTPATIENT)
Dept: CARDIOLOGY | Facility: CLINIC | Age: 47
End: 2022-03-31
Payer: COMMERCIAL

## 2022-03-31 DIAGNOSIS — I50.33 ACUTE ON CHRONIC DIASTOLIC CONGESTIVE HEART FAILURE (H): ICD-10-CM

## 2022-03-31 NOTE — TELEPHONE ENCOUNTER
"Noted on HRS pt BP today is 65/47 HR 87. Pt reports increased fatigue in the last 24 hours.     Called Bess; she denies any lightheadedness or dizziness.     Talked pt through her BP cuff (position, size etc) Pt states that she has been checking her BP on her forearm instead of her upper arm which the cuff is sized for. Pt states that she regularly gets \"ERROR\" messages on her BP cuff.     Instructed pt to recheck BP on her upper arm and coached her on how to properly place BP cuff. Pt states that BP keeps reading \"ERROR\".     Instructed pt to call HRS customer support to continue trouble shooting BP cuff and see if it needs to be replaced.  Customer service number provided to pt.     Pt made an in person appt with PCP for 5-11-22 which was first available. Pt Home Health RN Negin will be out to see her tomorrow. Requested that she check her BP and call clinic with results. Pt agreeable to plan.     Will Follow-up with pt tomorrow.     HRS time 10 minutes.     Alicia Almonte RN      "

## 2022-04-01 RX ORDER — LISINOPRIL 10 MG/1
5 TABLET ORAL DAILY
Qty: 30 TABLET | Refills: 0 | Status: SHIPPED | OUTPATIENT
Start: 2022-04-01 | End: 2022-04-05

## 2022-04-01 NOTE — TELEPHONE ENCOUNTER
Received call from Aylin RN from [facility]. She states that pt is asking for labs today but doesn't know who ordered them or what they are for.     Pt was suppose to have labs drawn at  walk in lab on Monday or Tuesday of this week to Follow-up on response to increased Bumex. Pt states she forgot. Reiterated importance of keeping lab appts and understanding what they are for. Pt verbalized understanding.     Home RN Aylin will check pts BP at home manually today; advised that if BP is low pt/RN needs to reach out to her PCP to further discuss titrating BP meds. Aylin will draw BMP at pt home on Monday 4-4-22 as she doesn't have any lab drawn needles with her to draw today.     Pt HRS BP today remains low but improved 87/51. Pt continues to deny any lightheadedness or dizziness.     Routing to Key RICHARDSON and Alexandra RN to Follow-up with pt/labs on Monday 4-4-22.     Alicia Almonte RN

## 2022-04-01 NOTE — TELEPHONE ENCOUNTER
"Home care RN from [facility] called about pt's  continuing asymptomatic hypotension. Lisinopril dose was decreased by cardiology 'but they won't do it again, they said primary doctor needs to address this.\" Appt made for first available appt next week on 4/5/2022 with Dr Newell.   "

## 2022-04-01 NOTE — TELEPHONE ENCOUNTER
Received call from [facility] home health RN Aylin stating that pt needs new prescription for Lisinopril 5 mg; medication updated in Epic but new rx was not called.     Medication called in to preferred pharmacy.     Alicia Almonte RN

## 2022-04-04 ENCOUNTER — MEDICAL CORRESPONDENCE (OUTPATIENT)
Dept: HEALTH INFORMATION MANAGEMENT | Facility: CLINIC | Age: 47
End: 2022-04-04
Payer: COMMERCIAL

## 2022-04-04 NOTE — TELEPHONE ENCOUNTER
Bess was unavailable when attempts to reach her today. No ability to leave VM   No labs done today as she was directed to do.   Weight is down 2.5# from yesterday as per HRS   Her b/p remains low.  She was unable to respond to the phone inquiries and questions surrounding if she is using Lisinopril.( which should be held at this point)    Alexandra Mukherjee RN BSN, CHFN

## 2022-04-05 ENCOUNTER — TELEPHONE (OUTPATIENT)
Dept: CARDIOLOGY | Facility: CLINIC | Age: 47
End: 2022-04-05

## 2022-04-05 ENCOUNTER — OFFICE VISIT (OUTPATIENT)
Dept: FAMILY MEDICINE | Facility: CLINIC | Age: 47
End: 2022-04-05
Payer: COMMERCIAL

## 2022-04-05 VITALS
TEMPERATURE: 97.2 F | HEART RATE: 88 BPM | HEIGHT: 64 IN | RESPIRATION RATE: 18 BRPM | SYSTOLIC BLOOD PRESSURE: 90 MMHG | BODY MASS INDEX: 50.02 KG/M2 | WEIGHT: 293 LBS | DIASTOLIC BLOOD PRESSURE: 62 MMHG

## 2022-04-05 DIAGNOSIS — I73.00 RAYNAUD'S DISEASE WITHOUT GANGRENE: ICD-10-CM

## 2022-04-05 DIAGNOSIS — L30.4 INTERTRIGO: Primary | ICD-10-CM

## 2022-04-05 DIAGNOSIS — I50.32 CHRONIC DIASTOLIC CONGESTIVE HEART FAILURE (H): ICD-10-CM

## 2022-04-05 DIAGNOSIS — I95.89 OTHER SPECIFIED HYPOTENSION: ICD-10-CM

## 2022-04-05 DIAGNOSIS — I89.0 LYMPHEDEMA OF BOTH LOWER EXTREMITIES: ICD-10-CM

## 2022-04-05 DIAGNOSIS — R39.89 ABNORMAL URINE COLOR: ICD-10-CM

## 2022-04-05 LAB
ANION GAP SERPL CALCULATED.3IONS-SCNC: 20 MMOL/L (ref 3–14)
BUN SERPL-MCNC: 164 MG/DL (ref 7–30)
CALCIUM SERPL-MCNC: 10.1 MG/DL (ref 8.5–10.1)
CHLORIDE BLD-SCNC: 85 MMOL/L (ref 94–109)
CO2 SERPL-SCNC: 21 MMOL/L (ref 20–32)
CREAT SERPL-MCNC: 6.2 MG/DL (ref 0.52–1.04)
GFR SERPL CREATININE-BSD FRML MDRD: 8 ML/MIN/1.73M2
GLUCOSE BLD-MCNC: 96 MG/DL (ref 70–99)
POTASSIUM BLD-SCNC: 4.7 MMOL/L (ref 3.4–5.3)
SODIUM SERPL-SCNC: 126 MMOL/L (ref 133–144)

## 2022-04-05 PROCEDURE — 80048 BASIC METABOLIC PNL TOTAL CA: CPT | Performed by: FAMILY MEDICINE

## 2022-04-05 PROCEDURE — 36415 COLL VENOUS BLD VENIPUNCTURE: CPT | Performed by: FAMILY MEDICINE

## 2022-04-05 PROCEDURE — 99214 OFFICE O/P EST MOD 30 MIN: CPT | Performed by: FAMILY MEDICINE

## 2022-04-05 RX ORDER — FLUCONAZOLE 150 MG/1
150 TABLET ORAL
Qty: 2 TABLET | Refills: 0 | Status: ON HOLD | OUTPATIENT
Start: 2022-04-05 | End: 2022-04-12

## 2022-04-05 RX ORDER — NYSTATIN 100000 [USP'U]/G
POWDER TOPICAL 2 TIMES DAILY
Qty: 30 G | Refills: 6 | Status: SHIPPED | OUTPATIENT
Start: 2022-04-05 | End: 2022-04-21

## 2022-04-05 ASSESSMENT — PAIN SCALES - GENERAL: PAINLEVEL: MILD PAIN (2)

## 2022-04-05 ASSESSMENT — PATIENT HEALTH QUESTIONNAIRE - PHQ9
5. POOR APPETITE OR OVEREATING: NOT AT ALL
SUM OF ALL RESPONSES TO PHQ QUESTIONS 1-9: 3

## 2022-04-05 ASSESSMENT — ANXIETY QUESTIONNAIRES
2. NOT BEING ABLE TO STOP OR CONTROL WORRYING: NOT AT ALL
5. BEING SO RESTLESS THAT IT IS HARD TO SIT STILL: NOT AT ALL
7. FEELING AFRAID AS IF SOMETHING AWFUL MIGHT HAPPEN: NOT AT ALL
3. WORRYING TOO MUCH ABOUT DIFFERENT THINGS: NOT AT ALL
1. FEELING NERVOUS, ANXIOUS, OR ON EDGE: SEVERAL DAYS
6. BECOMING EASILY ANNOYED OR IRRITABLE: SEVERAL DAYS
GAD7 TOTAL SCORE: 2

## 2022-04-05 NOTE — TELEPHONE ENCOUNTER
----- Message from Ivania Nolen sent at 4/5/2022  1:16 PM CDT -----  Regarding: Key Gardner  General phone call:    Caller: Zane Hatch   Primary cardiologist:Key   Detailed reason for call: They are waiting for an application for [employer].  The patient said it was filled out with the nurse a couple of weeks ago  fax [phone]  Best phone number: [phone]  Best time to contact: any  Ok to leave a detailedmessage? yes

## 2022-04-05 NOTE — TELEPHONE ENCOUNTER
Aimee from [facility] calling - First lymphedema eval is today.    Again 72/42 - No symptoms   Pt also has yeast infection in abd fold - discharge, redness and painful.  Wants Rx @ clinic visit today?    Also, urine is cloudy - Can UA be done @ Clinic visit today?     Lymphedema plan of care orders moving forward 1x more this week and then 2x a week x 5 weeks and then 1x a week for 1 week.  Aimee would also like a verbal order for a pneumatic compression pump   Call  Verbal orders to Aimee [phone]

## 2022-04-05 NOTE — TELEPHONE ENCOUNTER
Noted on HRS that pt weight has been very labile over the weekend. Pt weight today is up 2# from yesterday.     Today's weight is 334.5# Pt BP continue to be very low 70/48 yesterday, no reading from today. Pt continues to deny any lightheadedness or dizziness.     Called Bess; She states that she is feeling well. She denies any SOB, abdominal bloating, fatigue or difficulty sleeping. Pt states that she has her chronic swelling in BLE but is seeing OT today for her regular lymphadema wraps. No noted increase in BLE swelling.     Writer filled out [employer] application several weeks ago. Pt states that [employer] has reached out to her to set up meal delivery but she forgot to call them back. Writer asked her to please call them back today and provided [employer] contact number.     Pt states that she has been cooking from HF Folder recipes and limiting her fluid to 45-50 oz per day.     Pt currently taking Bumex 2 mg daily.     Routing to Key to review. For any Follow-up please reach out to Alexandra ARGUELLES.     HRS time 5 minutes.     Alicia Almonte RN

## 2022-04-05 NOTE — TELEPHONE ENCOUNTER
Phone call to patient. Reviewed response and recommendations per Ms. Flanagan. Patient verbalized understanding and agreement with plan. Patient will also see PCP today.   No further questions/concerns at this time. -alyssiab      Total time spent 15minutes

## 2022-04-05 NOTE — TELEPHONE ENCOUNTER
Please have her take an extra 2 mg of Bumex today.  Please have her stop lisinopril completely due to continued hypotension.  Thank you.  Lisinopril discontinued on med list.

## 2022-04-05 NOTE — TELEPHONE ENCOUNTER
Resent previous application on file to the [employer] organization per request  Alexandra Mukherjee RN BSN, CHFN

## 2022-04-05 NOTE — PROGRESS NOTES
Assessment & Plan     Intertrigo  Due to severity of redness, body habitus, and hx of recurrence, I felt that some oral tx with diflucan would help and then use maintenance tx with topical nystatin powder  - fluconazole (DIFLUCAN) 150 MG tablet  Dispense: 2 tablet; Refill: 0  - nystatin (MYCOSTATIN) 811923 UNIT/GM external powder  Dispense: 30 g; Refill: 6    Abnormal urine color  Discussed w/patient about asymptomatic bacteriuria vs true recurrent infections. At this time I am not not sure she is truly having sx especially with self reported underhydration, ongoing diuresis, etc. Was not able to provide a sample today so we provided a kit for home collection  - UA without Microscopic - lab collect    Lymphedema of both lower extremities  Some tenderness overlying the knee but skin appears similar to how the rest of her skin looks which is indeed abnormal with signs of chronic thickening/crusting. Advised monitoring for now.    Other specified hypotension  Agree w/discontinuation of lisinopril per cardiology. Pt has f/u with them    Chronic diastolic congestive heart failure (H)  - Basic metabolic panel  - ACE/ARB/ARNI NOT PRESCRIBED (INTENTIONAL)    Raynaud's disease without gangrene  A very chronically ill patient and I am not certain that this is why her fingers were blue/purple, rather it may be peripheral vasoconstriction in the setting of severe heart failure, but since it seemed that some of her fingers were worse than others without a clear pattern, and that she described intermittent symptoms, we discussed strategies such as warmth, potential meds but did not try to start today given complex cardiac hx and patient not severely bothered by her fingers.    Return if symptoms worsen or fail to improve.    Sandra Newell MD  [facility]            Cornell Kellogg is a 46 year old who presents for the following health issues  accompanied by her mother.    History of Present Illness  "      Reason for visit:  Low blood pressur and skin sensitivity  Symptom onset:  3-4 weeks ago  Symptoms include:  No symptoms for blood pressure    She eats 2-3 servings of fruits and vegetables daily.She consumes 1 sweetened beverage(s) daily.She exercises with enough effort to increase her heart rate 9 or less minutes per day.  She exercises with enough effort to increase her heart rate 3 or less days per week.   She is taking medications regularly.     Hypertension Follow-up    Do you check your blood pressure regularly outside of the clinic? Yes     Are you following a low salt diet? Yes    Are your blood pressures ever more than 140 on the top number (systolic) OR more   than 90 on the bottom number (diastolic), for example 140/90? No   3-30-22 72/42 patient discontinued lisinopril today     She feels fine during these checks but has had repeated checks at low BP  Usually at home using home BP machine and also with home RNs check, she gets the 70s/40s but asymptomatic  She was told to stop lisinopril and increase bumex due to increased weight  She is on a program where she gets BP, weight, and a survey that she takes daily, and they monitor from afar  So it seems her weight must have fluctuated a bit up from 331-334    Review of Systems   Constitutional, HEENT, cardiovascular, pulmonary, gi and gu systems are negative, except as otherwise noted.      Objective    BP 90/62   Pulse 88   Temp 97.2  F (36.2  C) (Tympanic)   Resp 18   Ht 1.626 m (5' 4\")   Wt (!) 151.5 kg (334 lb)   BMI 57.33 kg/m    Body mass index is 57.33 kg/m .  Physical Exam   GENERAL: chronically ill appearing, wheelchair bound, but does not appear acutely ill today, alert and no distress  NECK: no adenopathy, no asymmetry, masses, or scars and thyroid normal to palpation  RESP: lungs clear to auscultation - no rales, rhonchi or wheezes  CV: regular rate and rhythm, normal S1 S2, no S3 or S4, no murmur, click or rub, no peripheral edema " and peripheral pulses strong  ABDOMEN: soft, nontender, no hepatosplenomegaly, no masses and bowel sounds normal   (female): intertrigo  MS: no gross musculoskeletal defects noted, BLE are in compression garments. Some fingertips are blue bilaterally, no clubbing.  PSYCH: mentation appears normal, affect normal/bright

## 2022-04-06 ENCOUNTER — APPOINTMENT (OUTPATIENT)
Dept: CT IMAGING | Facility: HOSPITAL | Age: 47
DRG: 683 | End: 2022-04-06
Attending: EMERGENCY MEDICINE
Payer: COMMERCIAL

## 2022-04-06 ENCOUNTER — TELEPHONE (OUTPATIENT)
Dept: CARDIOLOGY | Facility: CLINIC | Age: 47
End: 2022-04-06

## 2022-04-06 ENCOUNTER — HOSPITAL ENCOUNTER (INPATIENT)
Facility: HOSPITAL | Age: 47
LOS: 6 days | Discharge: HOME HEALTH | DRG: 683 | End: 2022-04-12
Attending: EMERGENCY MEDICINE | Admitting: INTERNAL MEDICINE
Payer: COMMERCIAL

## 2022-04-06 DIAGNOSIS — N39.0 URINARY TRACT INFECTION WITH HEMATURIA, SITE UNSPECIFIED: ICD-10-CM

## 2022-04-06 DIAGNOSIS — N39.0 URINARY TRACT INFECTION WITHOUT HEMATURIA, SITE UNSPECIFIED: ICD-10-CM

## 2022-04-06 DIAGNOSIS — E66.01 MORBID OBESITY (H): Primary | Chronic | ICD-10-CM

## 2022-04-06 DIAGNOSIS — E87.1 HYPONATREMIA: ICD-10-CM

## 2022-04-06 DIAGNOSIS — N28.9 RENAL INSUFFICIENCY: ICD-10-CM

## 2022-04-06 DIAGNOSIS — R31.9 URINARY TRACT INFECTION WITH HEMATURIA, SITE UNSPECIFIED: ICD-10-CM

## 2022-04-06 DIAGNOSIS — G35 MULTIPLE SCLEROSIS (H): Chronic | ICD-10-CM

## 2022-04-06 DIAGNOSIS — R16.1 SPLENOMEGALY: ICD-10-CM

## 2022-04-06 LAB
ALBUMIN UR-MCNC: 300 MG/DL
ANION GAP SERPL CALCULATED.3IONS-SCNC: 20 MMOL/L (ref 5–18)
ANION GAP SERPL CALCULATED.3IONS-SCNC: 21 MMOL/L (ref 5–18)
APPEARANCE UR: ABNORMAL
BACTERIA #/AREA URNS HPF: ABNORMAL /HPF
BASOPHILS # BLD AUTO: 0 10E3/UL (ref 0–0.2)
BASOPHILS NFR BLD AUTO: 0 %
BILIRUB UR QL STRIP: NEGATIVE
BNP SERPL-MCNC: 223 PG/ML (ref 0–67)
BUN SERPL-MCNC: 155 MG/DL (ref 8–22)
BUN SERPL-MCNC: 158 MG/DL (ref 8–22)
CALCIUM SERPL-MCNC: 9.3 MG/DL (ref 8.5–10.5)
CALCIUM SERPL-MCNC: 9.9 MG/DL (ref 8.5–10.5)
CHLORIDE BLD-SCNC: 85 MMOL/L (ref 98–107)
CHLORIDE BLD-SCNC: 89 MMOL/L (ref 98–107)
CO2 SERPL-SCNC: 18 MMOL/L (ref 22–31)
CO2 SERPL-SCNC: 22 MMOL/L (ref 22–31)
COLOR UR AUTO: YELLOW
CREAT SERPL-MCNC: 5.98 MG/DL (ref 0.6–1.1)
CREAT SERPL-MCNC: 6.65 MG/DL (ref 0.6–1.1)
EOSINOPHIL # BLD AUTO: 0.2 10E3/UL (ref 0–0.7)
EOSINOPHIL NFR BLD AUTO: 2 %
ERYTHROCYTE [DISTWIDTH] IN BLOOD BY AUTOMATED COUNT: 16 % (ref 10–15)
GFR SERPL CREATININE-BSD FRML MDRD: 7 ML/MIN/1.73M2
GFR SERPL CREATININE-BSD FRML MDRD: 8 ML/MIN/1.73M2
GLUCOSE BLD-MCNC: 130 MG/DL (ref 70–125)
GLUCOSE BLD-MCNC: 90 MG/DL (ref 70–125)
GLUCOSE UR STRIP-MCNC: NEGATIVE MG/DL
HCT VFR BLD AUTO: 40.3 % (ref 35–47)
HGB BLD-MCNC: 13.3 G/DL (ref 11.7–15.7)
HGB UR QL STRIP: ABNORMAL
IMM GRANULOCYTES # BLD: 0 10E3/UL
IMM GRANULOCYTES NFR BLD: 0 %
KETONES UR STRIP-MCNC: ABNORMAL MG/DL
LEUKOCYTE ESTERASE UR QL STRIP: ABNORMAL
LYMPHOCYTES # BLD AUTO: 0.7 10E3/UL (ref 0.8–5.3)
LYMPHOCYTES NFR BLD AUTO: 8 %
MAGNESIUM SERPL-MCNC: 2 MG/DL (ref 1.8–2.6)
MCH RBC QN AUTO: 27 PG (ref 26.5–33)
MCHC RBC AUTO-ENTMCNC: 33 G/DL (ref 31.5–36.5)
MCV RBC AUTO: 82 FL (ref 78–100)
MONOCYTES # BLD AUTO: 0.8 10E3/UL (ref 0–1.3)
MONOCYTES NFR BLD AUTO: 8 %
NEUTROPHILS # BLD AUTO: 7.9 10E3/UL (ref 1.6–8.3)
NEUTROPHILS NFR BLD AUTO: 82 %
NITRATE UR QL: POSITIVE
NRBC # BLD AUTO: 0 10E3/UL
NRBC BLD AUTO-RTO: 0 /100
PH UR STRIP: 5.5 [PH] (ref 5–7)
PLATELET # BLD AUTO: 236 10E3/UL (ref 150–450)
POTASSIUM BLD-SCNC: 4.1 MMOL/L (ref 3.5–5)
POTASSIUM BLD-SCNC: 4.9 MMOL/L (ref 3.5–5)
RBC # BLD AUTO: 4.92 10E6/UL (ref 3.8–5.2)
RBC URINE: 0 /HPF
SARS-COV-2 RNA RESP QL NAA+PROBE: NEGATIVE
SODIUM SERPL-SCNC: 127 MMOL/L (ref 136–145)
SODIUM SERPL-SCNC: 128 MMOL/L (ref 136–145)
SP GR UR STRIP: 1.01 (ref 1–1.03)
TRANSITIONAL EPI: 1 /HPF
TROPONIN I SERPL-MCNC: 0.03 NG/ML (ref 0–0.29)
UROBILINOGEN UR STRIP-MCNC: <2 MG/DL
WBC # BLD AUTO: 9.6 10E3/UL (ref 4–11)
WBC CLUMPS #/AREA URNS HPF: PRESENT /HPF
WBC URINE: >182 /HPF

## 2022-04-06 PROCEDURE — 5A09357 ASSISTANCE WITH RESPIRATORY VENTILATION, LESS THAN 24 CONSECUTIVE HOURS, CONTINUOUS POSITIVE AIRWAY PRESSURE: ICD-10-PCS | Performed by: INTERNAL MEDICINE

## 2022-04-06 PROCEDURE — 80048 BASIC METABOLIC PNL TOTAL CA: CPT | Performed by: EMERGENCY MEDICINE

## 2022-04-06 PROCEDURE — 83605 ASSAY OF LACTIC ACID: CPT | Performed by: INTERNAL MEDICINE

## 2022-04-06 PROCEDURE — 96365 THER/PROPH/DIAG IV INF INIT: CPT

## 2022-04-06 PROCEDURE — 36415 COLL VENOUS BLD VENIPUNCTURE: CPT

## 2022-04-06 PROCEDURE — 83880 ASSAY OF NATRIURETIC PEPTIDE: CPT | Performed by: EMERGENCY MEDICINE

## 2022-04-06 PROCEDURE — 250N000013 HC RX MED GY IP 250 OP 250 PS 637: Performed by: INTERNAL MEDICINE

## 2022-04-06 PROCEDURE — 87088 URINE BACTERIA CULTURE: CPT | Performed by: EMERGENCY MEDICINE

## 2022-04-06 PROCEDURE — 87635 SARS-COV-2 COVID-19 AMP PRB: CPT | Performed by: EMERGENCY MEDICINE

## 2022-04-06 PROCEDURE — 81001 URINALYSIS AUTO W/SCOPE: CPT | Performed by: EMERGENCY MEDICINE

## 2022-04-06 PROCEDURE — 93010 ELECTROCARDIOGRAM REPORT: CPT | Performed by: INTERNAL MEDICINE

## 2022-04-06 PROCEDURE — 74176 CT ABD & PELVIS W/O CONTRAST: CPT

## 2022-04-06 PROCEDURE — 999N000157 HC STATISTIC RCP TIME EA 10 MIN

## 2022-04-06 PROCEDURE — 250N000011 HC RX IP 250 OP 636: Performed by: EMERGENCY MEDICINE

## 2022-04-06 PROCEDURE — 120N000001 HC R&B MED SURG/OB

## 2022-04-06 PROCEDURE — 99223 1ST HOSP IP/OBS HIGH 75: CPT | Performed by: INTERNAL MEDICINE

## 2022-04-06 PROCEDURE — 99285 EMERGENCY DEPT VISIT HI MDM: CPT | Mod: 25

## 2022-04-06 PROCEDURE — 258N000003 HC RX IP 258 OP 636: Performed by: INTERNAL MEDICINE

## 2022-04-06 PROCEDURE — 83735 ASSAY OF MAGNESIUM: CPT

## 2022-04-06 PROCEDURE — 85025 COMPLETE CBC W/AUTO DIFF WBC: CPT | Performed by: EMERGENCY MEDICINE

## 2022-04-06 PROCEDURE — C9803 HOPD COVID-19 SPEC COLLECT: HCPCS

## 2022-04-06 PROCEDURE — 84484 ASSAY OF TROPONIN QUANT: CPT

## 2022-04-06 PROCEDURE — 80048 BASIC METABOLIC PNL TOTAL CA: CPT

## 2022-04-06 PROCEDURE — 36415 COLL VENOUS BLD VENIPUNCTURE: CPT | Performed by: EMERGENCY MEDICINE

## 2022-04-06 PROCEDURE — 36415 COLL VENOUS BLD VENIPUNCTURE: CPT | Performed by: INTERNAL MEDICINE

## 2022-04-06 PROCEDURE — 93005 ELECTROCARDIOGRAM TRACING: CPT

## 2022-04-06 RX ORDER — ACETAMINOPHEN 325 MG/1
650 TABLET ORAL EVERY 6 HOURS PRN
Status: DISCONTINUED | OUTPATIENT
Start: 2022-04-06 | End: 2022-04-12 | Stop reason: HOSPADM

## 2022-04-06 RX ORDER — SODIUM CHLORIDE 9 MG/ML
INJECTION, SOLUTION INTRAVENOUS CONTINUOUS
Status: ACTIVE | OUTPATIENT
Start: 2022-04-06 | End: 2022-04-07

## 2022-04-06 RX ORDER — CEFTRIAXONE 1 G/1
1 INJECTION, POWDER, FOR SOLUTION INTRAMUSCULAR; INTRAVENOUS EVERY 24 HOURS
Status: DISCONTINUED | OUTPATIENT
Start: 2022-04-07 | End: 2022-04-12 | Stop reason: HOSPADM

## 2022-04-06 RX ORDER — GABAPENTIN 100 MG/1
100 CAPSULE ORAL 3 TIMES DAILY
Status: DISCONTINUED | OUTPATIENT
Start: 2022-04-06 | End: 2022-04-12 | Stop reason: HOSPADM

## 2022-04-06 RX ORDER — LIDOCAINE 40 MG/G
CREAM TOPICAL
Status: DISCONTINUED | OUTPATIENT
Start: 2022-04-06 | End: 2022-04-12 | Stop reason: HOSPADM

## 2022-04-06 RX ORDER — METOPROLOL SUCCINATE 25 MG/1
25 TABLET, EXTENDED RELEASE ORAL DAILY
Status: DISCONTINUED | OUTPATIENT
Start: 2022-04-06 | End: 2022-04-12 | Stop reason: HOSPADM

## 2022-04-06 RX ORDER — CEFTRIAXONE 1 G/1
1 INJECTION, POWDER, FOR SOLUTION INTRAMUSCULAR; INTRAVENOUS ONCE
Status: COMPLETED | OUTPATIENT
Start: 2022-04-06 | End: 2022-04-06

## 2022-04-06 RX ADMIN — GABAPENTIN 100 MG: 100 CAPSULE ORAL at 19:02

## 2022-04-06 RX ADMIN — SERTRALINE HYDROCHLORIDE 100 MG: 50 TABLET ORAL at 20:05

## 2022-04-06 RX ADMIN — CEFTRIAXONE SODIUM 1 G: 1 INJECTION, POWDER, FOR SOLUTION INTRAMUSCULAR; INTRAVENOUS at 12:20

## 2022-04-06 RX ADMIN — SODIUM CHLORIDE: 9 INJECTION, SOLUTION INTRAVENOUS at 18:56

## 2022-04-06 RX ADMIN — ACETAMINOPHEN 650 MG: 325 TABLET ORAL at 19:02

## 2022-04-06 RX ADMIN — MICONAZOLE NITRATE: 20 POWDER TOPICAL at 20:06

## 2022-04-06 ASSESSMENT — ACTIVITIES OF DAILY LIVING (ADL)
ADLS_ACUITY_SCORE: 9
TOILETING_ASSISTANCE: TOILETING DIFFICULTY, REQUIRES EQUIPMENT
ADLS_ACUITY_SCORE: 9
DEPENDENT_IADLS:: CLEANING;COOKING;LAUNDRY;SHOPPING;MEAL PREPARATION;TRANSPORTATION
ADLS_ACUITY_SCORE: 17
DRESSING/BATHING_DIFFICULTY: YES
FALL_HISTORY_WITHIN_LAST_SIX_MONTHS: YES
DRESSING/BATHING: BATHING DIFFICULTY, REQUIRES EQUIPMENT
ADLS_ACUITY_SCORE: 17
DOING_ERRANDS_INDEPENDENTLY_DIFFICULTY: YES
DIFFICULTY_COMMUNICATING: NO
WALKING_OR_CLIMBING_STAIRS: AMBULATION DIFFICULTY, REQUIRES EQUIPMENT
HEARING_DIFFICULTY_OR_DEAF: NO
NUMBER_OF_TIMES_PATIENT_HAS_FALLEN_WITHIN_LAST_SIX_MONTHS: 1
ADLS_ACUITY_SCORE: 9
WEAR_GLASSES_OR_BLIND: NO
WALKING_OR_CLIMBING_STAIRS_DIFFICULTY: YES
CHANGE_IN_FUNCTIONAL_STATUS_SINCE_ONSET_OF_CURRENT_ILLNESS/INJURY: NO
CONCENTRATING,_REMEMBERING_OR_MAKING_DECISIONS_DIFFICULTY: NO
ADLS_ACUITY_SCORE: 9
ADLS_ACUITY_SCORE: 17
ADLS_ACUITY_SCORE: 17
DIFFICULTY_EATING/SWALLOWING: NO
TOILETING_ISSUES: YES
EQUIPMENT_CURRENTLY_USED_AT_HOME: GRAB BAR, TUB/SHOWER
ADLS_ACUITY_SCORE: 17

## 2022-04-06 ASSESSMENT — ENCOUNTER SYMPTOMS
VOMITING: 0
DYSURIA: 0
BRUISES/BLEEDS EASILY: 1
FEVER: 0
NAUSEA: 0
SHORTNESS OF BREATH: 0
HEADACHES: 0
RECTAL PAIN: 1
FREQUENCY: 0
BLOOD IN STOOL: 1
CONFUSION: 0
WOUND: 0
ABDOMINAL PAIN: 0
TROUBLE SWALLOWING: 0
DIARRHEA: 1

## 2022-04-06 ASSESSMENT — ANXIETY QUESTIONNAIRES: GAD7 TOTAL SCORE: 2

## 2022-04-06 NOTE — PLAN OF CARE
"Patient has CPAP order. Patient's states \" her sister will bring her home unit\".   RT to continue to follow.    Domingo Mishra, RT    "

## 2022-04-06 NOTE — TELEPHONE ENCOUNTER
----- Message from JULIETA Herbert CNP sent at 4/6/2022  7:28 AM CDT -----  Please inform patient of lab results.  She needs to be seen in ED ASAP due to kidney function significantly decreased and hyponatremia.

## 2022-04-06 NOTE — ED NOTES
[facility] ED Handoff Report    ED Chief Complaint: elevated creatinine    ED Diagnosis:  (N39.0) Urinary tract infection without hematuria, site unspecified  Comment:   Plan:     (N28.9) Renal insufficiency  Comment:   Plan:     (E87.1) Hyponatremia  Comment:   Plan:     (R16.1) Splenomegaly  Comment:   Plan:        PMH:    Past Medical History:   Diagnosis Date     Acute on chronic diastolic congestive heart failure (H) 2/9/2022     Arthritis 2019    Hips     ASCUS favor benign 8/2015    Neg HPV     Depressive disorder 2010     H/O colposcopy with cervical biopsy 9/14/15    Bx & ECC - negative     Hypertension 2019     LSIL on Pap smear 7/2014    Neg high risk HPV     Multiple sclerosis (H) 8/29/2005 9/18/200pt dx with MS 2004--dx by neurolgist and is followed by Dr. Steven Ayala 10/2016     UNSPEC CONSTIPATION 9/18/2006 9/18/2006   Has tried otc suppository and otc lax.         Code Status:  Full Code     Falls Risk: Yes Band: Applied    Current Living Situation/Residence: lives in a house with mother    Elimination Status: Continent: No     Activity Level: 2 assist    Patients Preferred Language:  English     Needed: No    Vital Signs:  BP 90/48   Pulse 109   Temp 98  F (36.7  C) (Oral)   Resp 20   Wt 101.6 kg (224 lb)   SpO2 95%   BMI 38.45 kg/m       Cardiac Rhythm: Sinus tach low 100s    Pain Score: 2/10    Is the Patient Confused:  No    Last Food or Drink: 04/06/22 at 0800    Focused Assessment:  Very cloudy urine, lymphedema, a/o x4    Tests Performed: Done: Labs and Imaging    Treatments Provided:  See mar    Family Dynamics/Concerns: No    Family Updated On Visitor Policy: Yes    Plan of Care Communicated to Family: Yes    Who Was Updated about Plan of Care: mother, has been with patient all day    Belongings Checklist Done and Signed by Patient: Yes    Medications sent with patient: NA    Covid: asymptomatic , pending    Additional Information: will need help  moving/transferring. Some 'soft' BP, hovering  systolic. Other vitals normal. Very painful legs and backside. IV in left hand    RN: Ravin Rosales   4/6/2022 5:15 PM

## 2022-04-06 NOTE — ED TRIAGE NOTES
Patient presents here under the advisement of her cardiologist after lab values revealed that her creatinine was very elevated. Her K+ was 4.7

## 2022-04-06 NOTE — ED PROVIDER NOTES
Expected Patient Referral to ED  7:37 AM    Referring Clinic/Provider:  Cardiac clinic nurse    Reason for referral/Clinical facts:  HF pt. Cards got lab back Cr 6, last month normal.     Recommendations provided:  Send to ED for further evaluation    Caller was informed that this institution does possess the capabilities and/or resources to provide for patient and should be transferred to our facility but I let caller know I would recommend they let patient know we have multiple boarders currently and if admission was required may need transfer to another hospital.     Discussed that if direct admit is sought and any hurdles are encountered, this ED would be happy to see the patient and evaluate.    Informed caller that recommendations provided are recommendations based only on the facts provided and that they responsible to accept or reject the advice, or to seek a formal in person consultation as needed and that this ED will see/treat patient should they arrive.      Doyle Watkins MD  Emergency Medicine  [facility] SURGICAL  [address] [facility] [address]  [phone]       Doyle Watkins MD  04/06/22 0738

## 2022-04-06 NOTE — PHARMACY-ADMISSION MEDICATION HISTORY
Pharmacy Note - Admission Medication History    Pertinent Provider Information: The patient was instructed to hold her birth control while taking the higher dose of Xarelto. She decided to hold it until she has finished taking the Xarelto altogether.     ______________________________________________________________________    Prior To Admission (PTA) med list completed and updated in EMR.       PTA Med List   Medication Sig Last Dose     ACE/ARB/ARNI NOT PRESCRIBED (INTENTIONAL) Please choose reason not prescribed from choices below.      acetaminophen (TYLENOL) 325 MG tablet Take 650 mg by mouth every 6 hours as needed for mild pain  Unknown at Unknown time     bumetanide (BUMEX) 2 MG tablet Take 1 tablet (2 mg) by mouth daily (Patient taking differently: Take 2 mg by mouth 3 times daily ) 4/5/2022 at Unknown time     Cholecalciferol (VITAMIN D3 PO) Take 10,000 Units by mouth daily  4/5/2022 at Unknown time     desogestrel-ethinyl estradiol (APRI) 0.15-30 MG-MCG tablet TAKE 1 TABLET DAILY CONTINUOUSLY-OMIT PLACEBPO TABS UNTIL AFTER 3 MONTHS OF HORMONE TABS Unknown at Unknown time     fluconazole (DIFLUCAN) 150 MG tablet Take 1 tablet (150 mg) by mouth every 3 days for 2 doses 4/5/2022 at x first dose     gabapentin (NEURONTIN) 100 MG capsule TAKE 2 CAPSULES(200 MG) BY MOUTH THREE TIMES DAILY 4/5/2022 at Unknown time     metoprolol succinate ER (TOPROL-XL) 25 MG 24 hr tablet TAKE 1 TABLET(25 MG) BY MOUTH DAILY 4/5/2022 at Unknown time     miconazole (MICATIN) 2 % external powder Apply topically 2 times daily (Patient taking differently: Apply topically 2 times daily as needed ) Unknown at Unknown time     nystatin (MYCOSTATIN) 569376 UNIT/GM external powder Apply topically 2 times daily 4/5/2022 at Unknown time     rivaroxaban ANTICOAGULANT (XARELTO) 20 MG TABS tablet Take 1 tablet (20 mg) by mouth daily (with dinner) 4/5/2022 at Unknown time     sertraline (ZOLOFT) 100 MG tablet Take 1 tablet (100 mg) by mouth  daily 4/5/2022 at Unknown time       Information source(s): Patient and CareEverywhere/SureScripts  Method of interview communication: in-person    Summary of Changes to PTA Med List  New: none  Discontinued: none  Changed: bumex, miconazole    Patient was asked about OTC/herbal products specifically.  PTA med list reflects this.    In the past week, patient estimated taking medication this percent of the time:  greater than 90%.    Allergies were reviewed, assessed, and updated with the patient.      Patient does not anticipate needing any multi-use medications during admission.    The information provided in this note is only as accurate as the sources available at the time of the update(s).    Thank you for the opportunity to participate in the care of this patient.    Audrey Mcconnell [facility]  4/6/2022 1:36 PM

## 2022-04-06 NOTE — TELEPHONE ENCOUNTER
Pt contacted with lab results and recommended ED evaluation for critical Creatinine level of 6.2    She was in agreement with this plan.    Alexandra Mukherjee RN BSN, CHFN

## 2022-04-06 NOTE — H&P
ADMISSION HISTORY & PHYSICAL    CODE STATUS:  Mikala Jauregui, [phone]    Patient YOB: 1975  Admit date: 4/6/2022  Date of Service: 4/6/2022    ASSESSMENT AND PLAN:  46 year old female with PMH of hypertension, CHF, nephrolithiasis, PE on xarelto, NARCISA, multiple sclerosis, chronic lymphedema who was referred to our ED by her cardiology clinic for evaluation of abnormal labs.     Acute renal failure:  High AG metabolic acidosis:  -- Presented with creatinine of 6.6. It was normal in Feb 2022.  Likely pre-renal due to overdiuresis. Bumex has recently been increased per patient.   -- CT shows no hydronephrosis  -- Hold bumex and ACEI  -- Gentle hydration  -- Nephrology consult    Hyponatremia:  -- Sodium of 127 noted. Suspect due to intravascular vol depletion from overdiuresis  -- IVF as above.    UTI:  -- Abnormal UA noted  -- Agree with IV rocephin awaiting culture report    Diarrhea:  -- Check stool for enteric pathogens and cdiff    H/O PE:  -- Hold xarelto due to low GFR.     HFpEF:  -- BNP is elevated in the context of renal failure. I dont belive she is in acute CHF  -- Hold diuretics for now    NARCISA:  -- CPAP at night    Lymphedema:  -- Consult lymphedema PT/OT    Disposition:  -Anticipated Length of Stay in midnights and medical necessity (including a midnight in the Emergency Department after triage if applicable): >2      CHIEF COMPLAINT:  Abnormal labs, fatigue    HISTORY OF PRESENTING ILLNESS:  46 year old female with PMH of hypertension, CHF, nephrolithiasis, PE on xarelto, NARCISA, multiple sclerosis, chronic lymphedema who was referred to our ED by her cardiology clinic for evaluation of abnormal labs.     Patient reports she was seen in her PCP's clinic yesterday. The patient reports doing well besides the feeling of fatigue. She was called by the cardiology clinic this morning (the lab works were ordered by the cardiology clinic); and was told to go to ED as her labs were  abnormal. Her creatinine was noted to be high at 6.2. patient denies taking any NSAIDs. He reports she is urinating less frequently and the urine has been cloudy for a while. Denies any fevers or chills. Denies any abdominal pain, nausea or vomiting. She reports multiple episodes of diarrhea yesterday and day before. The diarrhea seems to have resolved today. She reports pain in the rectal area; reports she may have hemorrhoids.     PMH/PSH:  Patient Active Problem List   Diagnosis     Multiple sclerosis (H)     Polycystic ovaries     Constipation     CARDIOVASCULAR SCREENING; LDL GOAL LESS THAN 160     Anxiety     Restless legs     Leg edema     Eating disorder     Papanicolaou smear of cervix with low grade squamous intraepithelial lesion (LGSIL)     Morbid obesity (H)     Peroneal tendon rupture     Benign essential hypertension     NARCISA (obstructive sleep apnea)     Moderate episode of recurrent major depressive disorder (H)     Cellulitis of abdominal wall     Sepsis (H)     History of abnormal cervical Pap smear     Generalized anxiety disorder     Lymphedema of both lower extremities     Tachycardia     SIRS (systemic inflammatory response syndrome) (H)     Cellulitis of right leg     Cellulitis     Pneumonia due to infectious organism, unspecified laterality, unspecified part of lung     Acute pulmonary embolism without acute cor pulmonale, unspecified pulmonary embolism type (H)     COVID-19     Chronic diastolic congestive heart failure (H)     Acute respiratory failure with hypoxia (H)     NSTEMI (non-ST elevated myocardial infarction) (H)     Dehydration     Hyperkalemia     Acute renal failure, unspecified acute renal failure type (H)     Hypotension due to hypovolemia     History of pulmonary embolism     Ureteral stone     Urinary tract infection     Hydronephrosis with urinary obstruction due to ureteral calculus     Chronic heart failure with preserved ejection fraction (H)     Splenomegaly      Hyponatremia     Renal insufficiency       Past Medical History:   Diagnosis Date     Acute on chronic diastolic congestive heart failure (H) 2/9/2022     Arthritis 2019    Hips     ASCUS favor benign 8/2015    Neg HPV     Depressive disorder 2010     H/O colposcopy with cervical biopsy 9/14/15    Bx & ECC - negative     Hypertension 2019     LSIL on Pap smear 7/2014    Neg high risk HPV     Multiple sclerosis (H) 8/29/2005 9/18/200pt dx with MS 2004--dx by neurolgist and is followed by Dr. Steven Ayala 10/2016     UNSPEC CONSTIPATION 9/18/2006 9/18/2006   Has tried otc suppository and otc lax.         Past Surgical History:   Procedure Laterality Date     CHOLECYSTECTOMY, LAPOROSCOPIC      Cholecystectomy, Laparoscopic     COLONOSCOPY  2007?    Bathroom issue..results normal     COMBINED CYSTOSCOPY, RETROGRADES, EXCHANGE STENT URETER(S) Right 2/21/2022    Procedure: CYSTOSCOPY, WITH right RETROGRADE PYELOGRAM AND right URETERAL STENT PLACEMENT;  Surgeon: Doyle Palomares MD;  [place] [facility] [place]     OPEN REDUCTION INTERNAL FIXATION TOE(S)  4/13/2012    Procedure:OPEN REDUCTION INTERNAL FIXATION TOE(S); Open reduction internal fixation right proximal fifth metatarsal fracture-   Anes-choice block; Surgeon:LEY, JEFFREY DUANE; Location:[place] OR     PICC TRIPLE LUMEN PLACEMENT  2/21/2022               ALLERGIES:  Allergies   Allergen Reactions     Nkda [No Known Drug Allergies]        MEDICATIONS:  Reviewed.  Current Facility-Administered Medications   Medication     cefTRIAXone (ROCEPHIN) 1 g vial to attach to  mL bag for ADULTS or NS 50 mL bag for PEDS     Current Outpatient Medications   Medication     ACE/ARB/ARNI NOT PRESCRIBED (INTENTIONAL)     acetaminophen (TYLENOL) 325 MG tablet     bumetanide (BUMEX) 2 MG tablet     Cholecalciferol (VITAMIN D3 PO)     desogestrel-ethinyl estradiol (APRI) 0.15-30 MG-MCG tablet     fluconazole (DIFLUCAN) 150 MG tablet     gabapentin (NEURONTIN) 100 MG  capsule     metoprolol succinate ER (TOPROL-XL) 25 MG 24 hr tablet     miconazole (MICATIN) 2 % external powder     nystatin (MYCOSTATIN) 940802 UNIT/GM external powder     rivaroxaban ANTICOAGULANT (XARELTO) 20 MG TABS tablet     sertraline (ZOLOFT) 100 MG tablet       Current Facility-Administered Medications:      cefTRIAXone (ROCEPHIN) 1 g vial to attach to  mL bag for ADULTS or NS 50 mL bag for PEDS, 1 g, Intravenous, Once, Doyle Watkins MD, 1 g at 04/06/22 1220    Current Outpatient Medications:      ACE/ARB/ARNI NOT PRESCRIBED (INTENTIONAL), Please choose reason not prescribed from choices below., Disp: , Rfl:      acetaminophen (TYLENOL) 325 MG tablet, Take 650 mg by mouth every 6 hours as needed for mild pain , Disp: , Rfl:      bumetanide (BUMEX) 2 MG tablet, Take 1 tablet (2 mg) by mouth daily (Patient taking differently: Take 6 mg by mouth daily ), Disp: 90 tablet, Rfl: 1     Cholecalciferol (VITAMIN D3 PO), Take 10,000 Units by mouth daily , Disp: , Rfl:      desogestrel-ethinyl estradiol (APRI) 0.15-30 MG-MCG tablet, TAKE 1 TABLET DAILY CONTINUOUSLY-OMIT PLACEBPO TABS UNTIL AFTER 3 MONTHS OF HORMONE TABS (Patient not taking: Reported on 3/2/2022), Disp: 112 tablet, Rfl: 3     fluconazole (DIFLUCAN) 150 MG tablet, Take 1 tablet (150 mg) by mouth every 3 days for 2 doses, Disp: 2 tablet, Rfl: 0     gabapentin (NEURONTIN) 100 MG capsule, TAKE 2 CAPSULES(200 MG) BY MOUTH THREE TIMES DAILY, Disp: 540 capsule, Rfl: 1     metoprolol succinate ER (TOPROL-XL) 25 MG 24 hr tablet, TAKE 1 TABLET(25 MG) BY MOUTH DAILY, Disp: 90 tablet, Rfl: 1     miconazole (MICATIN) 2 % external powder, Apply topically 2 times daily, Disp:  , Rfl:      nystatin (MYCOSTATIN) 539973 UNIT/GM external powder, Apply topically 2 times daily, Disp: 30 g, Rfl: 6     rivaroxaban ANTICOAGULANT (XARELTO) 20 MG TABS tablet, Take 1 tablet (20 mg) by mouth daily (with dinner), Disp: 90 tablet, Rfl: 0     sertraline (ZOLOFT) 100 MG  tablet, Take 1 tablet (100 mg) by mouth daily, Disp: 90 tablet, Rfl: 3   Scheduled Meds:    cefTRIAXone  1 g Intravenous Once     Continuous Infusions:  PRN Meds:.    SOCIAL HISTORY:  Social History     Socioeconomic History     Marital status: Single     Spouse name: Not on file     Number of children: Not on file     Years of education: Not on file     Highest education level: Not on file   Occupational History     Employer: [email or web address]   Tobacco Use     Smoking status: Never Smoker     Smokeless tobacco: Never Used   Vaping Use     Vaping Use: Never used   Substance and Sexual Activity     Alcohol use: Yes     Comment: social     Drug use: No     Sexual activity: Not Currently     Partners: Male     Birth control/protection: Pill   Other Topics Concern     Parent/sibling w/ CABG, MI or angioplasty before 65F 55M? No   Social History Narrative    , 3rd-5th grade     Social Determinants of Health     Financial Resource Strain: Low Risk      Difficulty of Paying Living Expenses: Not very hard   Food Insecurity: No Food Insecurity     Worried About Running Out of Food in the Last Year: Never true     Ran Out of Food in the Last Year: Never true   Transportation Needs: No Transportation Needs     Lack of Transportation (Medical): No     Lack of Transportation (Non-Medical): No   Physical Activity: Inactive     Days of Exercise per Week: 0 days     Minutes of Exercise per Session: 0 min   Stress: Not on file   Social Connections: Unknown     Frequency of Communication with Friends and Family: Twice a week     Frequency of Social Gatherings with Friends and Family: Twice a week     Attends Taoist Services: Not on file     Active Member of Clubs or Organizations: Not on file     Attends Club or Organization Meetings: Not on file     Marital Status: Not on file   Intimate Partner Violence: Not At Risk     Fear of Current or Ex-Partner: No     Emotionally Abused: No     Physically Abused: No      Sexually Abused: No   Housing Stability: Not on file       FAMILY HISTORY:  Family History   Problem Relation Age of Onset     Neurologic Disorder Father         MS     Heart Disease Father         irregular heart rate     Diabetes Father      Melanoma Father      Sleep Apnea Father      Other Cancer Father      Cancer Maternal Grandfather         lung     Other Cancer Maternal Grandfather      Cancer Paternal Grandmother         stomach     Other Cancer Paternal Grandmother      Cancer Mother      Other Cancer Mother      Thyroid Disease Mother      Gynecology Maternal Aunt         PCOS     Hypertension Maternal Grandmother      Anxiety Disorder Maternal Grandmother      Thyroid Disease Maternal Grandmother      Anxiety Disorder Sister         ROS:  12 point review of systems reviewed and is negative except for what has already been mentioned in HPI.       PHYSICAL EXAM:  GENRL:  Not in acute distress   BP 94/54   Pulse 105   Temp 98  F (36.7  C) (Oral)   Resp 22   Wt 101.6 kg (224 lb)   SpO2 91%   BMI 38.45 kg/m    No intake/output data recorded.  No intake/output data recorded.  HEENT: NC/AT      Eyes-  Pupil round and reactive to light bilaterally       Neck- supple, no JVP elevation,       Sclera- anicteric      Oropharynx- moist and pink  CHEST: Clear to auscultation bilateral anteriorly, no ronchi or wheezing  HEART: S1S2 normal, regular.   ABDMN: Soft. Non-tender, non-distended.  No guarding or rigidity. Bowel sounds- active  EXTRM: Chronic LE lymphedema. Chronic skin erythema on both sides, left >right. This has not changed per patient.   MUSCULOSKELETAL: no joint tenderness or swelling on upper and lower extremities  NEURO: Alert and awake. Cranial nerves II-XII grossly intact. No focal neurological deficit.      DIAGNOSTIC DATA:    Recent Labs   Lab 04/06/22  1107   WBC 9.6   HGB 13.3   HCT 40.3          Recent Labs   Lab 04/06/22  1107 04/05/22  1609   * 126*   CO2 22 21   *  164*       No results for input(s): INR in the last 168 hours.    Recent Labs   Lab 04/06/22  1107 04/05/22  1609   * 126*   CO2 22 21   * 164*       [unfilled]    XR Chest 2 Views    Result Date: 3/23/2022  EXAM: XR CHEST 2 VW LOCATION: [facility] DATE/TIME: 3/23/2022 11:02 AM INDICATION: new cough x 3 4 days, recent pneumonia COMPARISON: 02/20/2022.     IMPRESSION: Shallow inspiration. Lungs are clear. Nothing for pneumonia on today's study. No new findings.    Abd/pelvis CT no contrast - Stone Protocol    Result Date: 4/6/2022  EXAM: CT ABDOMEN PELVIS W/O CONTRAST LOCATION: [facility] DATE/TIME: 4/6/2022 10:45 AM INDICATION: Diarrhea, dehydration, elevated creatinine and history of renal stones. COMPARISON: 02/21/2022. TECHNIQUE: CT scan of the abdomen and pelvis was performed without IV contrast. Multiplanar reformats were obtained. Dose reduction techniques were used. CONTRAST: None. FINDINGS: LOWER CHEST: Lung bases are clear. Dense calcification of the mitral annulus. HEPATOBILIARY: Mild diffuse decreased attenuation in liver consistent with hepatic steatosis. No focal hepatic mass. Status post cholecystectomy. No biliary dilatation. PANCREAS: Normal. SPLEEN: Borderline splenomegaly with the spleen measuring 14.5 x 6.4 x 11.9 cm. No splenic lesions. ADRENAL GLANDS: Normal. KIDNEYS/BLADDER: Mild prominence of the right renal pelvis not significantly changed from the prior study. The stone that was seen in the right proximal ureter on the prior study is no longer present. There are no renal or ureteral stones on either side.  No left-sided hydronephrosis. No suspicious renal masses. No bladder stone or mass. BOWEL: No bowel obstruction or free intraperitoneal air. Scattered colonic diverticula without evidence for diverticulitis or abscess. Normal appendix. LYMPH NODES: There are normal-sized retroperitoneal lymph nodes no lymphadenopathy.  VASCULATURE: Unremarkable. PELVIC ORGANS: Again noted is a 9.7 x 8.9 cm adnexal mass containing fat, soft tissue elements and calcification consistent with a mature teratoma. It appears unchanged from the prior study. This may arise from the left ovary but its large size puts it in the midline makes it difficult to say which ovary it arises from. MUSCULOSKELETAL: Mild degenerative change in the lumbar spine with grade 1 anterolisthesis of L4 on L5 likely secondary to facet arthropathy. No acute fracture or suspicious bony lesion.     IMPRESSION: 1.  The right proximal ureteral stone seen on the prior study is no longer present. There is mild prominence of the right upper collecting system but no stones or masses are seen in either kidney no left-sided hydronephrosis. 2.  Stable large mature teratoma (dermoid cyst) in the pelvis. 3.  No evidence for bowel obstruction, colitis, appendicitis or diverticulitis. 4.  Borderline splenomegaly.     XR KUB    Result Date: 3/23/2022  EXAM: XR KUB LOCATION: [facility] DATE/TIME: 3/23/2022 11:02 AM INDICATION:  Calculus of ureter COMPARISON: CT 02/21/2022     IMPRESSION: Renal collecting systems are significantly obscured by large body habitus as well as stool and bowel. I do not see evidence of the right ureteral stone is seen on prior CT although this certainly could be obscured. Consider follow-up CT. Nothing for bowel obstruction. Calcifications in the pelvis consistent with the known dermoid seen on CT.      Patient's new lab studies reviewed personally.  Patient's new radiology reports reviewed personally.  I personally viewed and personally interpreted patient's EKG:     Note created using dragon voice recognition software.  Errors in spelling or words which seems out of context are unintentional.  Sounds alike errors may have escaped editing.     04/06/2022  Bienvenido Morris MD  HOSPITALIST, [facility]  PAGER NO. [phone]

## 2022-04-06 NOTE — CONSULTS
Care Management Initial Consult    General Information  Assessment completed with: Patient, Parents,    Type of CM/SW Visit: Initial Assessment    Primary Care Provider verified and updated as needed: Yes   Readmission within the last 30 days: no previous admission in last 30 days      Reason for Consult: discharge planning  Advance Care Planning:            Communication Assessment  Patient's communication style: spoken language (English or Bilingual)    Hearing Difficulty or Deaf: no   Wear Glasses or Blind: no    Cognitive  Cognitive/Neuro/Behavioral:                        Living Environment:   People in home: alone     Current living Arrangements: house      Able to return to prior arrangements: yes       Family/Social Support:  Care provided by: homecare agency  Provides care for: no one, unable/limited ability to care for self     Sibling(s)          Description of Support System: Supportive, Involved         Current Resources:   Patient receiving home care services: Yes  Skilled Home Care Services: Skilled Nursing, Home Health Aid, Physicial Therapy, Occupational Therapy  Community Resources: PCA  Equipment currently used at home: grab bar, tub/shower, walker, rolling, wheelchair, manual  Supplies currently used at home:      Employment/Financial:  Employment Status:          Financial Concerns:             Lifestyle & Psychosocial Needs:  Social Determinants of Health     Tobacco Use: Low Risk      Smoking Tobacco Use: Never Smoker     Smokeless Tobacco Use: Never Used   Alcohol Use: Not on file   Financial Resource Strain: Low Risk      Difficulty of Paying Living Expenses: Not very hard   Food Insecurity: No Food Insecurity     Worried About Running Out of Food in the Last Year: Never true     Ran Out of Food in the Last Year: Never true   Transportation Needs: No Transportation Needs     Lack of Transportation (Medical): No     Lack of Transportation (Non-Medical): No   Physical Activity: Inactive      Days of Exercise per Week: 0 days     Minutes of Exercise per Session: 0 min   Stress: Not on file   Social Connections: Unknown     Frequency of Communication with Friends and Family: Twice a week     Frequency of Social Gatherings with Friends and Family: Twice a week     Attends Hoahaoism Services: Not on file     Active Member of Clubs or Organizations: Not on file     Attends Club or Organization Meetings: Not on file     Marital Status: Not on file   Intimate Partner Violence: Not At Risk     Fear of Current or Ex-Partner: No     Emotionally Abused: No     Physically Abused: No     Sexually Abused: No   Depression: Not at risk     PHQ-2 Score: 0   Housing Stability: Not on file       Functional Status:  Prior to admission patient needed assistance:   Dependent ADLs:: Ambulation-walker, Bathing  Dependent IADLs:: Cleaning, Cooking, Laundry, Shopping, Meal Preparation, Transportation  Assesssment of Functional Status: Not at baseline with ADL Functioning    Mental Health Status:          Chemical Dependency Status:                Values/Beliefs:  Spiritual, Cultural Beliefs, Hoahaoism Practices, Values that affect care:                 Additional Information:  AIDET completed. Writer met with Pt and Pts mom. Pt lives alone in her own home. Her sister Rocío: [phone] is her PCA at this time until patient establishes herself with another one. Patient also receives home care from [facility]: RN, OT, PT, HHA. She has a walker and wheel chair. She had been hospitalized twice in February.      Anticipate pt will DC back home and resume home care with [facility] along with PCA services.   Family will transport upon DC. Informed that CM will follow progression of care.   Mariza Collier RN, CM, [facility]

## 2022-04-06 NOTE — ED PROVIDER NOTES
EMERGENCY DEPARTMENT ENCOUNTER      NAME: Bess Enamorado  AGE: 46 year old female  YOB: 1975  MRN: [id number]  EVALUATION DATE & TIME: No admission date for patient encounter.    PCP: Mikala Luna    ED PROVIDER: Sai Watkins MD        Chief Complaint   Patient presents with     Elevated Creatinine         FINAL IMPRESSION:  1. Urinary tract infection without hematuria, site unspecified    2. Renal insufficiency    3. Hyponatremia    4. Splenomegaly          ED COURSE & MEDICAL DECISION MAKING:    Pertinent Labs & Imaging studies reviewed. (See chart for details)  46 year old female presents to the Emergency Department for evaluation of abnormal lab result    At the conclusion of the encounter I discussed the results of all of the tests and the disposition. The questions were answered. The patient or family acknowledged understanding and was agreeable with the care plan.     ED Course as of 04/06/22 1352   Wed Apr 06, 2022   1218 Patient reports she overall feels well besides some lower extremity pain has had chronically.  She is anticoagulated from a PE secondary to COVID-19 in February 1218 She is recently had her diuresis increased and has been hypotensive at home denies any NSAIDs and comes to the clinic where they found she had a creatinine of 6 which was normal 1 month ago therefore she was transferred to the ER   1218 Is making urine which is cloudy   1218 Had recent renal colic with stenting without symptoms therefore CT without contrast was ordered which does not show any obstructing stones or hydronephrosis but does show a stable teratoma that patient is aware she has   1219 Based on urine suspect urinary tract infection therefore given ceftriaxone   1219 Suspect patient's been over diuresed as well as hypotension at home contributing to her failing kidneys   1219 I recommend admission   1219 Spoke with the hospitalist who agreed to admit patient but recommended I tag nephrology on  consult order which was placed   1219 DVT or cellulitis unlikely to lower extremities       9:47AM I met with the patient for the initial interview and physical examination. Discussed plan for treatment and workup in the ED. PPE: Provider wore face shield and N95 mask.    12:04 PM I reassessed the patient and updated her on the results. Declines transfer. I discussed admission with the patient. Patient is agreeable. All questions answered fully.   12:13 PM I spoke with Dr. Giordano, hospitalist who agrees to admit the patient.           MEDICATIONS GIVEN IN THE EMERGENCY:  Medications   cefTRIAXone (ROCEPHIN) 1 g vial to attach to  mL bag for ADULTS or NS 50 mL bag for PEDS (0 g Intravenous Stopped 4/6/22 1323)       NEW PRESCRIPTIONS STARTED AT TODAY'S ER VISIT  New Prescriptions    No medications on file          =================================================================    HPI    Triage note  Patient presents here under the advisement of her cardiologist after lab values revealed that her creatinine was very elevated. Her K+ was 4.7      Patient information was obtained from: patient     Use of : N/A         Bess Enamorado is a 46 year old female with a pertinent history of hypertension, CHF, nephrolithiasis, hydronephrosis, and dehydration who presents to this ED with mother for evaluation of abnormal labs.    Per chart review, patient was admitted to this hospital on 2/8/22 for acute respiratory failure with hypoxia, likely multifactorial. Patient required BiPAP on admission, was weaned off O2 on 2/16 but needed 5L O2 on CPAP when sleeping and with exercise. At time of admission, BNP was 2230 and clinically appeared to be volume overloaded. Initially on Lasix drip, transitioned to IV Lasix push on 2/12, and oral diuretics at discharge. CT chest showed acute right upper lobe PE, pulmonary artery hypertension, and right heart dysfunction, initially on Heparin drip and transitioned to Xarelto.  "Recently tested positive for Covid-19 on 12/11/2021, tested positive again on 2/8/22, chest CT reported new right lung infiltrate compatible with pneumonia, procalcitonin and WBC elevated, completed 3 days of remdesivir per ID. Discharged home in stable condition on 2/18/22. Admitted to [facility] again on 2/20/22 for UTI, sepsis, and obstructing ureteral stone. Had hypotension and leukocytosis during presentation, BP did not improve with fluids, and she was admitted to MICU for pressors. CT abdomen showed 8 x 7 x 3mm stone proximal right ureter causing mild hydronephrosis. Received urgent cystoscopy with right retrograde pyelogram and right ureteral stent placement on 2/21, concerning infected stone, treated with Zosyn and transitioned to Cefuroxime. Recently started on Xarelto, was resumed after stent placement, patient developed gross hematuria, and Xarelto was held until hematuria resolved. Per urology, right ureteral stent will be in place for at least 2 weeks followed by ureteroscopic stone extraction with laser lithotripsy outpatient. Creatinine 4.4 on admission, improved to normal range after stent placement. Discharged home on 2/27/22. Patient also saw her PCP on 4/5/22, BMP was obtained, creatinine result was 6.2, increased from 0.70-0.78 on 2/23/22-2/26/22. Sodium was also low at 126. Patient was contacted by phone today (4/6), notified of the results, and referred to the ED.     Patient received a call today regarding elevated creatinine level and was referred to this ED. Legs are diffusely painful, including the skin, and she feels fatigued but is otherwise doing well. Taking tylenol at home but denies using ibuprofen, naproxen, advil, or alleve. Denies any abdominal pain but notes that she did not have any pain when they found her kidney stone. Has been urinating less frequently than normal, adding that urine has been very cloudy for \"a while.\" No dysuria. Monitors her weight closely, has been " fluctuating 2-3 lbs all week. Has not taken diuretic yet today, last dose was mid morning yesterday. Lisinopril was stopped yesterday and Bumex was increased. Denies recent antibiotics. Still taking blood thinner for recent PE.    Reports that she had 1 episode of diarrhea yesterday, which was a very large amount and lasted a long time. Otherwise denies nausea, vomiting, gait difficulty, or any other complaints at this time.      REVIEW OF SYSTEMS   Review of Systems   Constitutional: Negative for fever.   HENT: Negative for trouble swallowing.    Respiratory: Negative for shortness of breath.    Cardiovascular: Positive for leg swelling (chronic).   Gastrointestinal: Positive for blood in stool, diarrhea and rectal pain. Negative for abdominal pain, nausea and vomiting.   Genitourinary: Negative for dysuria and frequency.        Positive for cloudy urine.   Musculoskeletal: Negative for gait problem.        Positive for bilateral leg pain.    Skin: Negative for wound.   Neurological: Negative for headaches.   Hematological: Bruises/bleeds easily.   Psychiatric/Behavioral: Negative for confusion.        PAST MEDICAL HISTORY:  Past Medical History:   Diagnosis Date     Acute on chronic diastolic congestive heart failure (H) 2/9/2022     Arthritis 2019    Hips     ASCUS favor benign 8/2015    Neg HPV     Depressive disorder 2010     H/O colposcopy with cervical biopsy 9/14/15    Bx & ECC - negative     Hypertension 2019     LSIL on Pap smear 7/2014    Neg high risk HPV     Multiple sclerosis (H) 8/29/2005 9/18/200pt dx with MS 2004--dx by neurolgist and is followed by Dr. Steven Ayala 10/2016     UNSPEC CONSTIPATION 9/18/2006 9/18/2006   Has tried otc suppository and otc lax.        PAST SURGICAL HISTORY:  Past Surgical History:   Procedure Laterality Date     CHOLECYSTECTOMY, LAPOROSCOPIC      Cholecystectomy, Laparoscopic     COLONOSCOPY  2007?    Bathroom issue..results normal     COMBINED CYSTOSCOPY,  RETROGRADES, EXCHANGE STENT URETER(S) Right 2/21/2022    Procedure: CYSTOSCOPY, WITH right RETROGRADE PYELOGRAM AND right URETERAL STENT PLACEMENT;  Surgeon: Doyle Palomares MD;  [place] [facility] [place]     OPEN REDUCTION INTERNAL FIXATION TOE(S)  4/13/2012    Procedure:OPEN REDUCTION INTERNAL FIXATION TOE(S); Open reduction internal fixation right proximal fifth metatarsal fracture-   Anes-choice block; Surgeon:LEY, JEFFREY DUANE; Location:[facility] OR     PICC TRIPLE LUMEN PLACEMENT  2/21/2022                CURRENT MEDICATIONS:    ACE/ARB/ARNI NOT PRESCRIBED (INTENTIONAL)  acetaminophen (TYLENOL) 325 MG tablet  bumetanide (BUMEX) 2 MG tablet  Cholecalciferol (VITAMIN D3 PO)  desogestrel-ethinyl estradiol (APRI) 0.15-30 MG-MCG tablet  fluconazole (DIFLUCAN) 150 MG tablet  gabapentin (NEURONTIN) 100 MG capsule  metoprolol succinate ER (TOPROL-XL) 25 MG 24 hr tablet  miconazole (MICATIN) 2 % external powder  nystatin (MYCOSTATIN) 794133 UNIT/GM external powder  rivaroxaban ANTICOAGULANT (XARELTO) 20 MG TABS tablet  sertraline (ZOLOFT) 100 MG tablet        ALLERGIES:  Allergies   Allergen Reactions     Nkda [No Known Drug Allergies]        FAMILY HISTORY:  Family History   Problem Relation Age of Onset     Neurologic Disorder Father         MS     Heart Disease Father         irregular heart rate     Diabetes Father      Melanoma Father      Sleep Apnea Father      Other Cancer Father      Cancer Maternal Grandfather         lung     Other Cancer Maternal Grandfather      Cancer Paternal Grandmother         stomach     Other Cancer Paternal Grandmother      Cancer Mother      Other Cancer Mother      Thyroid Disease Mother      Gynecology Maternal Aunt         PCOS     Hypertension Maternal Grandmother      Anxiety Disorder Maternal Grandmother      Thyroid Disease Maternal Grandmother      Anxiety Disorder Sister        SOCIAL HISTORY:   Social History     Socioeconomic History     Marital status: Single     Spouse  name: Not on file     Number of children: Not on file     Years of education: Not on file     Highest education level: Not on file   Occupational History     Employer: [email or web address]   Tobacco Use     Smoking status: Never Smoker     Smokeless tobacco: Never Used   Vaping Use     Vaping Use: Never used   Substance and Sexual Activity     Alcohol use: Yes     Comment: social     Drug use: No     Sexual activity: Not Currently     Partners: Male     Birth control/protection: Pill   Other Topics Concern     Parent/sibling w/ CABG, MI or angioplasty before 65F 55M? No   Social History Narrative    , 3rd-5th grade     Social Determinants of Health     Financial Resource Strain: Low Risk      Difficulty of Paying Living Expenses: Not very hard   Food Insecurity: No Food Insecurity     Worried About Running Out of Food in the Last Year: Never true     Ran Out of Food in the Last Year: Never true   Transportation Needs: No Transportation Needs     Lack of Transportation (Medical): No     Lack of Transportation (Non-Medical): No   Physical Activity: Inactive     Days of Exercise per Week: 0 days     Minutes of Exercise per Session: 0 min   Stress: Not on file   Social Connections: Unknown     Frequency of Communication with Friends and Family: Twice a week     Frequency of Social Gatherings with Friends and Family: Twice a week     Attends Synagogue Services: Not on file     Active Member of Clubs or Organizations: Not on file     Attends Club or Organization Meetings: Not on file     Marital Status: Not on file   Intimate Partner Violence: Not At Risk     Fear of Current or Ex-Partner: No     Emotionally Abused: No     Physically Abused: No     Sexually Abused: No   Housing Stability: Not on file       VITALS:  BP 98/54   Pulse 105   Temp 98  F (36.7  C) (Oral)   Resp 22   Wt 101.6 kg (224 lb)   SpO2 91%   BMI 38.45 kg/m      PHYSICAL EXAM      Vitals: BP 98/54   Pulse 105   Temp 98  F  (36.7  C) (Oral)   Resp 22   Wt 101.6 kg (224 lb)   SpO2 91%   BMI 38.45 kg/m    General: Appears in no acute distress, awake, alert, interactive.  Eyes: Conjunctivae non-injected. Sclera anicteric.  HENT: Atraumatic.  Neck: Supple.  Respiratory/Chest: Respiration unlabored.  Abdomen: non distended  Musculoskeletal: Marked edema to lower extremities  Skin: Normal color.  Venous stasis-like changes to lower extremities  Neurologic: Face symmetric, moves all extremities spontaneously. Speech clear.  Psychiatric: Oriented to person, place, and time. Affect appropriate.       LAB:  All pertinent labs reviewed and interpreted.  Results for orders placed or performed during the hospital encounter of 04/06/22   Abd/pelvis CT no contrast - Stone Protocol    Impression    IMPRESSION:   1.  The right proximal ureteral stone seen on the prior study is no longer present. There is mild prominence of the right upper collecting system but no stones or masses are seen in either kidney no left-sided hydronephrosis.  2.  Stable large mature teratoma (dermoid cyst) in the pelvis.  3.  No evidence for bowel obstruction, colitis, appendicitis or diverticulitis.  4.  Borderline splenomegaly.     Basic metabolic panel   Result Value Ref Range    Sodium 127 (L) 136 - 145 mmol/L    Potassium 4.9 3.5 - 5.0 mmol/L    Chloride 85 (L) 98 - 107 mmol/L    Carbon Dioxide (CO2) 22 22 - 31 mmol/L    Anion Gap 20 (H) 5 - 18 mmol/L    Urea Nitrogen 158 (H) 8 - 22 mg/dL    Creatinine 6.65 (HH) 0.60 - 1.10 mg/dL    Calcium 9.9 8.5 - 10.5 mg/dL    Glucose 90 70 - 125 mg/dL    GFR Estimate 7 (L) >60 mL/min/1.73m2   B-Type Natriuretic Peptide ( [place] Only)   Result Value Ref Range     (H) 0 - 67 pg/mL   UA with Microscopic reflex to Culture    Specimen: Urine, Catheter   Result Value Ref Range    Color Urine Yellow Colorless, Straw, Light Yellow, Yellow    Appearance Urine Turbid (A) Clear    Glucose Urine Negative Negative mg/dL    Bilirubin  Urine Negative Negative    Ketones Urine Trace (A) Negative mg/dL    Specific Gravity Urine 1.011 1.001 - 1.030    Blood Urine 0.06 mg/dL (A) Negative    pH Urine 5.5 5.0 - 7.0    Protein Albumin Urine 300  (A) Negative mg/dL    Urobilinogen Urine <2.0 <2.0 mg/dL    Nitrite Urine Positive (A) Negative    Leukocyte Esterase Urine 500 Gigi/uL (A) Negative    Bacteria Urine Many (A) None Seen /HPF    WBC Clumps Urine Present (A) None Seen /HPF    RBC Urine 0 <=2 /HPF    WBC Urine >182 (H) <=5 /HPF    Transitional Epithelials Urine 1 <=1 /HPF   CBC with platelets and differential   Result Value Ref Range    WBC Count 9.6 4.0 - 11.0 10e3/uL    RBC Count 4.92 3.80 - 5.20 10e6/uL    Hemoglobin 13.3 11.7 - 15.7 g/dL    Hematocrit 40.3 35.0 - 47.0 %    MCV 82 78 - 100 fL    MCH 27.0 26.5 - 33.0 pg    MCHC 33.0 31.5 - 36.5 g/dL    RDW 16.0 (H) 10.0 - 15.0 %    Platelet Count 236 150 - 450 10e3/uL    % Neutrophils 82 %    % Lymphocytes 8 %    % Monocytes 8 %    % Eosinophils 2 %    % Basophils 0 %    % Immature Granulocytes 0 %    NRBCs per 100 WBC 0 <1 /100    Absolute Neutrophils 7.9 1.6 - 8.3 10e3/uL    Absolute Lymphocytes 0.7 (L) 0.8 - 5.3 10e3/uL    Absolute Monocytes 0.8 0.0 - 1.3 10e3/uL    Absolute Eosinophils 0.2 0.0 - 0.7 10e3/uL    Absolute Basophils 0.0 0.0 - 0.2 10e3/uL    Absolute Immature Granulocytes 0.0 <=0.4 10e3/uL    Absolute NRBCs 0.0 10e3/uL       RADIOLOGY:  Reviewed all pertinent imaging. Please see official radiology report.  Abd/pelvis CT no contrast - Stone Protocol   Final Result   IMPRESSION:    1.  The right proximal ureteral stone seen on the prior study is no longer present. There is mild prominence of the right upper collecting system but no stones or masses are seen in either kidney no left-sided hydronephrosis.   2.  Stable large mature teratoma (dermoid cyst) in the pelvis.   3.  No evidence for bowel obstruction, colitis, appendicitis or diverticulitis.   4.  Borderline splenomegaly.                    I, Denice Ann, am serving as a scribe to document services personally performed by Doyle Watkins MD based on my observation and the provider's statements to me. I, Dr. Doyle Watkins, attest that Denice Ann is acting in a scribe capacity, has observed my performance of the services and has documented them in accordance with my direction.    Doyle Watkins MD  Emergency Medicine  [facility] EMERGENCY DEPARTMENT  [address] [place] [id number]  [phone]     Doyle Watkins MD  04/06/22 1826

## 2022-04-07 ENCOUNTER — APPOINTMENT (OUTPATIENT)
Dept: OCCUPATIONAL THERAPY | Facility: HOSPITAL | Age: 47
DRG: 683 | End: 2022-04-07
Attending: INTERNAL MEDICINE
Payer: COMMERCIAL

## 2022-04-07 LAB
ANION GAP SERPL CALCULATED.3IONS-SCNC: 18 MMOL/L (ref 5–18)
ATRIAL RATE - MUSE: 114 BPM
BUN SERPL-MCNC: 151 MG/DL (ref 8–22)
CALCIUM SERPL-MCNC: 8.7 MG/DL (ref 8.5–10.5)
CHLORIDE BLD-SCNC: 93 MMOL/L (ref 98–107)
CK SERPL-CCNC: 91 U/L (ref 30–190)
CO2 SERPL-SCNC: 17 MMOL/L (ref 22–31)
CORTIS SERPL-MCNC: 13.1 UG/DL
CREAT SERPL-MCNC: 5.74 MG/DL (ref 0.6–1.1)
DIASTOLIC BLOOD PRESSURE - MUSE: NORMAL MMHG
ERYTHROCYTE [DISTWIDTH] IN BLOOD BY AUTOMATED COUNT: 16.2 % (ref 10–15)
GFR SERPL CREATININE-BSD FRML MDRD: 9 ML/MIN/1.73M2
GLUCOSE BLD-MCNC: 110 MG/DL (ref 70–125)
HCT VFR BLD AUTO: 34.7 % (ref 35–47)
HGB BLD-MCNC: 11.5 G/DL (ref 11.7–15.7)
INTERPRETATION ECG - MUSE: NORMAL
LACTATE SERPL-SCNC: 0.7 MMOL/L (ref 0.7–2)
LACTATE SERPL-SCNC: 1.2 MMOL/L (ref 0.7–2)
MCH RBC QN AUTO: 26.9 PG (ref 26.5–33)
MCHC RBC AUTO-ENTMCNC: 33.1 G/DL (ref 31.5–36.5)
MCV RBC AUTO: 81 FL (ref 78–100)
P AXIS - MUSE: 51 DEGREES
PHOSPHATE SERPL-MCNC: 8.5 MG/DL (ref 2.5–4.5)
PLATELET # BLD AUTO: 270 10E3/UL (ref 150–450)
POTASSIUM BLD-SCNC: 4.7 MMOL/L (ref 3.5–5)
PR INTERVAL - MUSE: 174 MS
PROCALCITONIN SERPL-MCNC: 0.75 NG/ML (ref 0–0.49)
QRS DURATION - MUSE: 94 MS
QT - MUSE: 332 MS
QTC - MUSE: 457 MS
R AXIS - MUSE: 110 DEGREES
RBC # BLD AUTO: 4.27 10E6/UL (ref 3.8–5.2)
SODIUM SERPL-SCNC: 128 MMOL/L (ref 136–145)
SYSTOLIC BLOOD PRESSURE - MUSE: NORMAL MMHG
T AXIS - MUSE: 52 DEGREES
TSH SERPL DL<=0.005 MIU/L-ACNC: 1.52 UIU/ML (ref 0.3–5)
VENTRICULAR RATE- MUSE: 114 BPM
WBC # BLD AUTO: 11 10E3/UL (ref 4–11)

## 2022-04-07 PROCEDURE — 82533 TOTAL CORTISOL: CPT | Performed by: INTERNAL MEDICINE

## 2022-04-07 PROCEDURE — 97535 SELF CARE MNGMENT TRAINING: CPT | Mod: GO

## 2022-04-07 PROCEDURE — 250N000013 HC RX MED GY IP 250 OP 250 PS 637: Performed by: INTERNAL MEDICINE

## 2022-04-07 PROCEDURE — 84443 ASSAY THYROID STIM HORMONE: CPT | Performed by: INTERNAL MEDICINE

## 2022-04-07 PROCEDURE — 82550 ASSAY OF CK (CPK): CPT | Performed by: INTERNAL MEDICINE

## 2022-04-07 PROCEDURE — 99233 SBSQ HOSP IP/OBS HIGH 50: CPT | Performed by: INTERNAL MEDICINE

## 2022-04-07 PROCEDURE — 258N000003 HC RX IP 258 OP 636: Performed by: INTERNAL MEDICINE

## 2022-04-07 PROCEDURE — 83605 ASSAY OF LACTIC ACID: CPT | Performed by: INTERNAL MEDICINE

## 2022-04-07 PROCEDURE — 36415 COLL VENOUS BLD VENIPUNCTURE: CPT | Performed by: INTERNAL MEDICINE

## 2022-04-07 PROCEDURE — 250N000009 HC RX 250: Performed by: STUDENT IN AN ORGANIZED HEALTH CARE EDUCATION/TRAINING PROGRAM

## 2022-04-07 PROCEDURE — 85027 COMPLETE CBC AUTOMATED: CPT | Performed by: INTERNAL MEDICINE

## 2022-04-07 PROCEDURE — 99221 1ST HOSP IP/OBS SF/LOW 40: CPT | Performed by: INTERNAL MEDICINE

## 2022-04-07 PROCEDURE — 84145 PROCALCITONIN (PCT): CPT | Performed by: INTERNAL MEDICINE

## 2022-04-07 PROCEDURE — 97166 OT EVAL MOD COMPLEX 45 MIN: CPT | Mod: GO

## 2022-04-07 PROCEDURE — 87040 BLOOD CULTURE FOR BACTERIA: CPT | Performed by: INTERNAL MEDICINE

## 2022-04-07 PROCEDURE — 250N000011 HC RX IP 250 OP 636: Performed by: INTERNAL MEDICINE

## 2022-04-07 PROCEDURE — 80048 BASIC METABOLIC PNL TOTAL CA: CPT | Performed by: INTERNAL MEDICINE

## 2022-04-07 PROCEDURE — G0463 HOSPITAL OUTPT CLINIC VISIT: HCPCS

## 2022-04-07 PROCEDURE — 83970 ASSAY OF PARATHORMONE: CPT | Performed by: INTERNAL MEDICINE

## 2022-04-07 PROCEDURE — 120N000001 HC R&B MED SURG/OB

## 2022-04-07 PROCEDURE — 258N000003 HC RX IP 258 OP 636: Performed by: STUDENT IN AN ORGANIZED HEALTH CARE EDUCATION/TRAINING PROGRAM

## 2022-04-07 PROCEDURE — 250N000013 HC RX MED GY IP 250 OP 250 PS 637: Performed by: STUDENT IN AN ORGANIZED HEALTH CARE EDUCATION/TRAINING PROGRAM

## 2022-04-07 PROCEDURE — 84100 ASSAY OF PHOSPHORUS: CPT | Performed by: INTERNAL MEDICINE

## 2022-04-07 RX ORDER — MIDODRINE HYDROCHLORIDE 2.5 MG/1
2.5 TABLET ORAL
Status: DISCONTINUED | OUTPATIENT
Start: 2022-04-07 | End: 2022-04-07

## 2022-04-07 RX ORDER — MIDODRINE HYDROCHLORIDE 5 MG/1
5 TABLET ORAL
Status: DISCONTINUED | OUTPATIENT
Start: 2022-04-07 | End: 2022-04-10

## 2022-04-07 RX ORDER — SODIUM BICARBONATE 650 MG/1
1300 TABLET ORAL 2 TIMES DAILY
Status: DISCONTINUED | OUTPATIENT
Start: 2022-04-07 | End: 2022-04-09

## 2022-04-07 RX ADMIN — SERTRALINE HYDROCHLORIDE 100 MG: 50 TABLET ORAL at 09:03

## 2022-04-07 RX ADMIN — SODIUM BICARBONATE 1300 MG: 648 TABLET ORAL at 20:11

## 2022-04-07 RX ADMIN — Medication 1 MG: at 20:11

## 2022-04-07 RX ADMIN — CEFTRIAXONE SODIUM 1 G: 1 INJECTION, POWDER, FOR SOLUTION INTRAMUSCULAR; INTRAVENOUS at 13:41

## 2022-04-07 RX ADMIN — GABAPENTIN 100 MG: 100 CAPSULE ORAL at 09:03

## 2022-04-07 RX ADMIN — GABAPENTIN 100 MG: 100 CAPSULE ORAL at 20:11

## 2022-04-07 RX ADMIN — SODIUM CHLORIDE 500 ML: 9 INJECTION, SOLUTION INTRAVENOUS at 09:38

## 2022-04-07 RX ADMIN — MICONAZOLE NITRATE: 20 POWDER TOPICAL at 20:12

## 2022-04-07 RX ADMIN — ACETAMINOPHEN 650 MG: 325 TABLET ORAL at 03:09

## 2022-04-07 RX ADMIN — SODIUM CHLORIDE 500 ML: 9 INJECTION, SOLUTION INTRAVENOUS at 11:43

## 2022-04-07 RX ADMIN — SODIUM CHLORIDE: 9 INJECTION, SOLUTION INTRAVENOUS at 12:15

## 2022-04-07 RX ADMIN — SODIUM CHLORIDE: 9 INJECTION, SOLUTION INTRAVENOUS at 09:16

## 2022-04-07 RX ADMIN — GABAPENTIN 100 MG: 100 CAPSULE ORAL at 13:47

## 2022-04-07 RX ADMIN — SODIUM CHLORIDE 1000 ML: 9 INJECTION, SOLUTION INTRAVENOUS at 00:32

## 2022-04-07 RX ADMIN — MICONAZOLE NITRATE: 20 POWDER TOPICAL at 09:08

## 2022-04-07 RX ADMIN — LIDOCAINE HYDROCHLORIDE 30 ML: 20 SOLUTION ORAL; TOPICAL at 03:47

## 2022-04-07 RX ADMIN — MIDODRINE HYDROCHLORIDE 5 MG: 5 TABLET ORAL at 16:06

## 2022-04-07 ASSESSMENT — ACTIVITIES OF DAILY LIVING (ADL)
ADLS_ACUITY_SCORE: 17
PREVIOUS_RESPONSIBILITIES: MEAL PREP;LAUNDRY;SHOPPING;MEDICATION MANAGEMENT;FINANCES
ADLS_ACUITY_SCORE: 17
ADLS_ACUITY_SCORE: 16
ADLS_ACUITY_SCORE: 17
ADLS_ACUITY_SCORE: 16
ADLS_ACUITY_SCORE: 16
ADLS_ACUITY_SCORE: 17
ADLS_ACUITY_SCORE: 16
ADLS_ACUITY_SCORE: 17
ADLS_ACUITY_SCORE: 16
ADLS_ACUITY_SCORE: 17

## 2022-04-07 NOTE — PROGRESS NOTES
Daily Progress Note        CODE STATUS:  Full Code    04/07/22  Assessment/Plan:  46 year old female with PMH of hypertension, CHF, nephrolithiasis, PE on xarelto, NARCISA, multiple sclerosis, chronic lymphedema who was referred to our ED by her cardiology clinic for evaluation of abnormal labs.      Acute renal failure:  High AG metabolic acidosis:  -- Presented with creatinine of 6.6. It was normal in Feb 2022.  Likely pre-renal due to overdiuresis. Bumex has recently been increased per patient. Hypotension might have contributed to this as the patient reports low BP at home for the last couple of wks.  -- CT shows no hydronephrosis  -- Hold bumex and ACEI  -- Gentle hydration. IVF rate increased to 125ml/hr. Will insert jiang for accurate I/O.  -- Nephrology consult. Awaiting input     Hypotension:  -- Lactic acid is normal. Holding metoprolol  -- Given NS bolus 500ml x 2 this morning. Started on Midodrine. Patient reports no lightheadedness. No chest pain or shortness of breath  -- Cont with IVF.   -- I donot suspetc PE as the patient had been on Xarelto, and has not been hypoxic  -- Check cortisol level and TSH. 2 more sets of blood culture obtained today    Hyponatremia:  -- Sodium of 127 noted. Suspect due to intravascular vol depletion from overdiuresis  -- IVF as above.     UTI:  -- Abnormal UA noted  -- Agree with IV rocephin awaiting culture report     Diarrhea:  -- No diarrhea since admission  -- Check stool for enteric pathogens and cdiff if recurs.      H/O PE:  -- Hold xarelto due to low GFR.      HFpEF:  -- BNP is elevated in the context of renal failure. I dont belive she is in acute CHF  -- Holding diuretics for now     NARCISA:  -- CPAP at night     Lymphedema:  -- Consult lymphedema PT/OT      Disposition; Several days  Barrier to discharge; HUNG, hypotension    Mother at bedside. I updated her and answered her questions.     Subjective:  Interval History: Patient seen and examined this morning. Patient  repots she feels a little better today. Reports her BP has been intermittently low at home for the last 2-3 wks. Denies any chest pain or shortness of breath.     Review of Systems:   As mentioned in subjective.    Patient Active Problem List   Diagnosis     Multiple sclerosis (H)     Polycystic ovaries     Constipation     CARDIOVASCULAR SCREENING; LDL GOAL LESS THAN 160     Anxiety     Restless legs     Leg edema     Eating disorder     Papanicolaou smear of cervix with low grade squamous intraepithelial lesion (LGSIL)     Morbid obesity (H)     Peroneal tendon rupture     Benign essential hypertension     NARCISA (obstructive sleep apnea)     Moderate episode of recurrent major depressive disorder (H)     Cellulitis of abdominal wall     Sepsis (H)     History of abnormal cervical Pap smear     Generalized anxiety disorder     Lymphedema of both lower extremities     Tachycardia     SIRS (systemic inflammatory response syndrome) (H)     Cellulitis of right leg     Cellulitis     Pneumonia due to infectious organism, unspecified laterality, unspecified part of lung     Acute pulmonary embolism without acute cor pulmonale, unspecified pulmonary embolism type (H)     COVID-19     Chronic diastolic congestive heart failure (H)     Acute respiratory failure with hypoxia (H)     NSTEMI (non-ST elevated myocardial infarction) (H)     Dehydration     Hyperkalemia     Acute renal failure, unspecified acute renal failure type (H)     Hypotension due to hypovolemia     History of pulmonary embolism     Ureteral stone     Urinary tract infection     Hydronephrosis with urinary obstruction due to ureteral calculus     Chronic heart failure with preserved ejection fraction (H)     Splenomegaly     Hyponatremia     Renal insufficiency       Scheduled Meds:    cefTRIAXone  1 g Intravenous Q24H     gabapentin  100 mg Oral TID     [Held by provider] metoprolol succinate ER  25 mg Oral Daily     miconazole   Topical BID     midodrine   2.5 mg Oral TID w/meals     sertraline  100 mg Oral Daily     sodium chloride (PF)  3 mL Intracatheter Q8H     Continuous Infusions:    sodium chloride 75 mL/hr at 04/07/22 1215     PRN Meds:.acetaminophen, lidocaine 4%, lidocaine (buffered or not buffered), melatonin, sodium chloride (PF)    Objective:  Vital signs in last 24 hours:  Temp:  [97.7  F (36.5  C)-98.8  F (37.1  C)] 98.3  F (36.8  C)  Pulse:  [] 98  Resp:  [] 18  BP: ()/(33-55) 79/46  SpO2:  [90 %-97 %] 90 %        Intake/Output Summary (Last 24 hours) at 4/7/2022 1251  Last data filed at 4/7/2022 0916  Gross per 24 hour   Intake 2399 ml   Output 2400 ml   Net -1 ml       Physical Exam:  HEENT: NC/AT                 Eyes-  Pupil round and reactive to light bilaterally                  Neck- supple, no JVP elevation,                  Sclera- anicteric                 Oropharynx- moist and pink  CHEST: Clear to auscultation bilateral anteriorly, no ronchi or wheezing  HEART: S1S2 normal, regular.   ABDMN: Soft. Non-tender, non-distended.  No guarding or rigidity. Bowel sounds- active  EXTRM: Chronic LE lymphedema. Chronic skin erythema on both sides, left >right. Better today.  NEURO: Alert and awake. Cranial nerves II-XII grossly intact. No focal neurological deficit.    Lab Results:(I have personally reviewed the results)    Recent Results (from the past 24 hour(s))   Asymptomatic COVID-19 Virus (Coronavirus) by PCR Nasopharyngeal    Collection Time: 04/06/22  4:33 PM    Specimen: Nasopharyngeal; Swab   Result Value Ref Range    SARS CoV2 PCR Negative Negative   ECG 12-LEAD WITH MUSE (LHE)    Collection Time: 04/06/22  8:41 PM   Result Value Ref Range    Systolic Blood Pressure  mmHg    Diastolic Blood Pressure  mmHg    Ventricular Rate 114 BPM    Atrial Rate 114 BPM    MO Interval 174 ms    QRS Duration 94 ms     ms    QTc 457 ms    P Axis 51 degrees    R AXIS 110 degrees    T Axis 52 degrees    Interpretation ECG       Sinus  tachycardia  Right axis deviation  Incomplete right bundle branch block  Possible Right ventricular hypertrophy  Abnormal ECG  When compared with ECG of 20-FEB-2022 21:04,  Questionable change in QRS axis  Nonspecific T wave abnormality no longer evident in Inferior leads  Nonspecific T wave abnormality has replaced inverted T waves in Anterior leads  Confirmed by ALBINO DENISE MD LOC:JN ([id number]) on 4/7/2022 9:05:39 AM     Troponin I    Collection Time: 04/06/22  8:48 PM   Result Value Ref Range    Troponin I 0.03 0.00 - 0.29 ng/mL   Basic metabolic panel    Collection Time: 04/06/22  8:48 PM   Result Value Ref Range    Sodium 128 (L) 136 - 145 mmol/L    Potassium 4.1 3.5 - 5.0 mmol/L    Chloride 89 (L) 98 - 107 mmol/L    Carbon Dioxide (CO2) 18 (L) 22 - 31 mmol/L    Anion Gap 21 (H) 5 - 18 mmol/L    Urea Nitrogen 155 (H) 8 - 22 mg/dL    Creatinine 5.98 (H) 0.60 - 1.10 mg/dL    Calcium 9.3 8.5 - 10.5 mg/dL    Glucose 130 (H) 70 - 125 mg/dL    GFR Estimate 8 (L) >60 mL/min/1.73m2   Magnesium    Collection Time: 04/06/22  8:48 PM   Result Value Ref Range    Magnesium 2.0 1.8 - 2.6 mg/dL   Lactic Acid STAT    Collection Time: 04/06/22 11:59 PM   Result Value Ref Range    Lactic Acid 0.7 0.7 - 2.0 mmol/L   Basic metabolic panel    Collection Time: 04/07/22  7:54 AM   Result Value Ref Range    Sodium 128 (L) 136 - 145 mmol/L    Potassium 4.7 3.5 - 5.0 mmol/L    Chloride 93 (L) 98 - 107 mmol/L    Carbon Dioxide (CO2) 17 (L) 22 - 31 mmol/L    Anion Gap 18 5 - 18 mmol/L    Urea Nitrogen 151 (H) 8 - 22 mg/dL    Creatinine 5.74 (H) 0.60 - 1.10 mg/dL    Calcium 8.7 8.5 - 10.5 mg/dL    Glucose 110 70 - 125 mg/dL    GFR Estimate 9 (L) >60 mL/min/1.73m2   CBC with platelets    Collection Time: 04/07/22  7:54 AM   Result Value Ref Range    WBC Count 11.0 4.0 - 11.0 10e3/uL    RBC Count 4.27 3.80 - 5.20 10e6/uL    Hemoglobin 11.5 (L) 11.7 - 15.7 g/dL    Hematocrit 34.7 (L) 35.0 - 47.0 %    MCV 81 78 - 100 fL    MCH 26.9 26.5 - 33.0  pg    MCHC 33.1 31.5 - 36.5 g/dL    RDW 16.2 (H) 10.0 - 15.0 %    Platelet Count 270 150 - 450 10e3/uL   Cortisol    Collection Time: 04/07/22  7:54 AM   Result Value Ref Range    Cortisol 13.1 ug/dL   Procalcitonin    Collection Time: 04/07/22  9:35 AM   Result Value Ref Range    Procalcitonin 0.75 (H) 0.00 - 0.49 ng/mL   Lactic acid whole blood    Collection Time: 04/07/22 11:57 AM   Result Value Ref Range    Lactic Acid 1.2 0.7 - 2.0 mmol/L       All laboratory and imaging data in the past 24 hours reviewed  Serum Glucose range:   Recent Labs   Lab 04/07/22  0754 04/06/22 2048 04/06/22  1107 04/05/22  1609    130* 90 96     ABG: No lab results found in last 7 days.  CBC:   Recent Labs   Lab 04/07/22  0754 04/06/22  1107   WBC 11.0 9.6   HGB 11.5* 13.3   HCT 34.7* 40.3   MCV 81 82    236   NEUTROPHIL  --  82   LYMPH  --  8   MONOCYTE  --  8   EOSINOPHIL  --  2     Chemistry:   Recent Labs   Lab 04/07/22  0754 04/06/22 2048 04/06/22  1107   * 128* 127*   POTASSIUM 4.7 4.1 4.9   CHLORIDE 93* 89* 85*   CO2 17* 18* 22   * 155* 158*   CR 5.74* 5.98* 6.65*   GFRESTIMATED 9* 8* 7*   EVITA 8.7 9.3 9.9   MAG  --  2.0  --      Coags:  No results for input(s): INR, PROTIME, PTT in the last 168 hours.    Invalid input(s): APTT  Cardiac Markers:  Recent Labs   Lab 04/06/22 2048   TROPONINI 0.03          XR Chest 2 Views    Result Date: 3/23/2022  EXAM: XR CHEST 2 VW LOCATION: [facility] DATE/TIME: 3/23/2022 11:02 AM INDICATION: new cough x 3 4 days, recent pneumonia COMPARISON: 02/20/2022.     IMPRESSION: Shallow inspiration. Lungs are clear. Nothing for pneumonia on today's study. No new findings.    Abd/pelvis CT no contrast - Stone Protocol    Result Date: 4/6/2022  EXAM: CT ABDOMEN PELVIS W/O CONTRAST LOCATION: [facility] DATE/TIME: 4/6/2022 10:45 AM INDICATION: Diarrhea, dehydration, elevated creatinine and history of renal stones. COMPARISON:  02/21/2022. TECHNIQUE: CT scan of the abdomen and pelvis was performed without IV contrast. Multiplanar reformats were obtained. Dose reduction techniques were used. CONTRAST: None. FINDINGS: LOWER CHEST: Lung bases are clear. Dense calcification of the mitral annulus. HEPATOBILIARY: Mild diffuse decreased attenuation in liver consistent with hepatic steatosis. No focal hepatic mass. Status post cholecystectomy. No biliary dilatation. PANCREAS: Normal. SPLEEN: Borderline splenomegaly with the spleen measuring 14.5 x 6.4 x 11.9 cm. No splenic lesions. ADRENAL GLANDS: Normal. KIDNEYS/BLADDER: Mild prominence of the right renal pelvis not significantly changed from the prior study. The stone that was seen in the right proximal ureter on the prior study is no longer present. There are no renal or ureteral stones on either side.  No left-sided hydronephrosis. No suspicious renal masses. No bladder stone or mass. BOWEL: No bowel obstruction or free intraperitoneal air. Scattered colonic diverticula without evidence for diverticulitis or abscess. Normal appendix. LYMPH NODES: There are normal-sized retroperitoneal lymph nodes no lymphadenopathy. VASCULATURE: Unremarkable. PELVIC ORGANS: Again noted is a 9.7 x 8.9 cm adnexal mass containing fat, soft tissue elements and calcification consistent with a mature teratoma. It appears unchanged from the prior study. This may arise from the left ovary but its large size puts it in the midline makes it difficult to say which ovary it arises from. MUSCULOSKELETAL: Mild degenerative change in the lumbar spine with grade 1 anterolisthesis of L4 on L5 likely secondary to facet arthropathy. No acute fracture or suspicious bony lesion.     IMPRESSION: 1.  The right proximal ureteral stone seen on the prior study is no longer present. There is mild prominence of the right upper collecting system but no stones or masses are seen in either kidney no left-sided hydronephrosis. 2.  Stable  large mature teratoma (dermoid cyst) in the pelvis. 3.  No evidence for bowel obstruction, colitis, appendicitis or diverticulitis. 4.  Borderline splenomegaly.     XR KUB    Result Date: 3/23/2022  EXAM: XR KUB LOCATION: [facility] DATE/TIME: 3/23/2022 11:02 AM INDICATION:  Calculus of ureter COMPARISON: CT 02/21/2022     IMPRESSION: Renal collecting systems are significantly obscured by large body habitus as well as stool and bowel. I do not see evidence of the right ureteral stone is seen on prior CT although this certainly could be obscured. Consider follow-up CT. Nothing for bowel obstruction. Calcifications in the pelvis consistent with the known dermoid seen on CT.      Latest radiology report personally reviewed.    Note created using dragon voice recognition software so sounds alike errors may have escaped editing.      04/07/2022   Bienvenido Morris MD  Hospitalist, [facility]  Pager: [phone]

## 2022-04-07 NOTE — PLAN OF CARE
Problem: Plan of Care - These are the overarching goals to be used throughout the patient stay.    Goal: Optimal Comfort and Wellbeing  Outcome: Ongoing, Progressing   Goal Outcome Evaluation:      Pt complained about chest pain, tylenol and GI coctail given with effectiveness. Call light within reach. On tele, sinus tachy. Purewick in place.

## 2022-04-07 NOTE — SIGNIFICANT EVENT
Significant Event Note    Time of event: 8:28 PM April 6, 2022    Description of event:  Paged regarding new chest pain.    Patient states she has 2-3/10, pressure-like substernal pain that radiates down her left arm. This is new for her.   HR: tachycardic with regular rhythm, cap refill ~3 sec  Lungs: CTAB    Plan:  EKG, troponin, BMP, magnesium stat      Thelma Dial MD

## 2022-04-07 NOTE — PLAN OF CARE
Goal Outcome Evaluation:    Plan of Care Reviewed With: patient, mother     Overall Patient Progress: no change    Outcome Evaluation: Pt BP remains low.  Bolus x2 given.  Mididrine ordered.  Pt is assymptomatic.  Skin is raw and painful in the perineum and in the folds.  Powder and cloth applied.

## 2022-04-07 NOTE — UTILIZATION REVIEW
Admission Status; Secondary Review Determination   Under the authority of the Utilization Management Committee, the utilization review process indicated a secondary review on Bess Enamorado. The review outcome is based on review of the medical records, discussions with staff, and applying clinical experience noted on the date of the review.   (x) Inpatient Status Appropriate - This patient's medical care is consistent with medical management for inpatient care and reasonable inpatient medical practice.     RATIONALE FOR DETERMINATION   46 yr old female with HTN, PE on xarelto, NARCISA, MS, chronic lymphedema, CHF presented after labs performed at Cardiology clinic with Cr >6.  Baseline normal on 2/22.  Repeat in ED again >6.  High AG metabolic acidosis.  Suspected overdiuresis.  Holding Bumex and ACEI.  Has received 3L NS thus far.  Cr still >5.6.  Also with UTI.  Renal consultation pending.  IVF continue.  This woman with complex cardiac history with Cr > 6 times normal and baseline is appropriate for inpatient.    At the time of admission with the information available to the attending physician more than 2 nights Hospital complex care was anticipated, based on patient risk of adverse outcome if treated as outpatient and complex care required. Inpatient admission is appropriate based on the Medicare guidelines.   The information on this document is developed by the utilization review team in order for the business office to ensure compliance. This only denotes the appropriateness of proper admission status and does not reflect the quality of care rendered.   The definitions of Inpatient Status and Observation Status used in making the determination above are those provided in the CMS Coverage Manual, Chapter 1 and Chapter 6, section 70.4.   Sincerely,   Jen Hernandez MD  Utilization Review  Physician Advisor  [facility]

## 2022-04-07 NOTE — PROGRESS NOTES
Pt home cpap set up at bedside, water added to reservoir.  O2 bleed placed in line with pt unit.  RN will assist pt with placement.

## 2022-04-07 NOTE — PROGRESS NOTES
04/07/22 1026   Quick Adds   Type of Visit Initial Occupational Therapy Evaluation   Living Environment   People in Home alone   Current Living Arrangements house   Home Accessibility other (see comments)  (ramp)   Self-Care   Usual Activity Tolerance fair   Current Activity Tolerance fair   Instrumental Activities of Daily Living (IADL)   Previous Responsibilities meal prep;laundry;shopping;medication management;finances   IADL Comments has OT/PT nursing, home health aid to help with a shower   General Information   Onset of Illness/Injury or Date of Surgery 04/06/22   Referring Physician nicolette carnes   Cognitive Status Examination   Orientation Status orientation to person, place and time   Posture   Posture forward head position   Range of Motion Comprehensive   General Range of Motion no range of motion deficits identified   Bed Mobility   Bed Mobility supine-sit;sit-supine   Supine-Sit Winn (Bed Mobility) maximum assist (25% patient effort)   Assistive Device (Bed Mobility) bed rails  (hob raised/l leg needs total asssit)   Transfers   Transfers sit-stand transfer   Sit-Stand Transfer   Sit-Stand Winn (Transfers) minimum assist (75% patient effort);moderate assist (50% patient effort)   Assistive Device (Sit-Stand Transfers) walker, front-wheeled   Balance   Balance Assessment sitting balance: static   Balance Comments sba   Clinical Impression   Criteria for Skilled Therapeutic Interventions Met (OT) Yes, treatment indicated   OT Diagnosis decreased adl's/endurance   OT Problem List-Impairments impacting ADL activity tolerance impaired;strength;mobility   Assessment of Occupational Performance 1-3 Performance Deficits   Planned Therapy Interventions (OT) ADL retraining;strengthening;transfer training   Risk & Benefits of therapy have been explained evaluation/treatment results reviewed   OT Discharge Planning   OT Discharge Recommendation (DC Rec) Transitional Care Facility   OT Rationale  for DC Rec assist of 1 for transfers/adl's   Total Evaluation Time (Minutes)   Total Evaluation Time (Minutes) 12   OT Goals   Therapy Frequency (OT) 3 times/wk   OT Predicated Duration/Target Date for Goal Attainment 04/19/22   OT Goals Hygiene/Grooming;Lower Body Dressing;Transfers;OT Goal 1   OT: Hygiene/Grooming minimal assist;while standing   OT: Lower Body Dressing Maximum assist;using adaptive equipment   OT: Transfer Minimal assist;with assistive device   OT: Goal 1 pt will participate in UE exercise for 10 minutes

## 2022-04-07 NOTE — CONSULTS
WO Nurse Inpatient Wound Assessment   Reason for consultation: Evaluate and treat ble wounds    Assessment  BLE wounds due to Venous Ulcer  Status: initial assessment and resurfaced   Signing off of ble as they are intact  Bilateral posterior knee creases, Thigh creases are linear MASD    Treatment Plan  Pannus, knee creases wounds: Daily    Cleanse creased gently with barrier bath wipes  Apply Interdry Ag 6 inches wide to allow for 2 inches of exposed sheet  Apply lymphedema wraps to legs     Left pannus wound - daily  Cleanse wound with bath wipes  Apply silver cell on the wound       Orders Written  Recommended provider order: Lymphedema consult, consult for buttock wound present on admission   WOC Nurse follow-up plan:weekly  Nursing to notify the Provider(s) and re-consult the WOC Nurse if wound(s) deteriorates or new skin concern.    Patient History  According to provider note(s):  46 year old female with PMH of hypertension, CHF, nephrolithiasis, PE on xarelto, NARCISA, multiple sclerosis, chronic lymphedema who was referred to our ED by her cardiology clinic for evaluation of abnormal labs.      Acute renal failure:  High AG metabolic acidosis:  -- Presented with creatinine of 6.6. It was normal in Feb 2022.  Likely pre-renal due to overdiuresis. Bumex has recently been increased per patient.     Objective Data  Containment of urine/stool: Continent of bladder, Continent of bowel and Incontinence Protocol    Active Diet Order  Orders Placed This Encounter      Regular Diet Adult      Output:   I/O last 3 completed shifts:  In: 2204 [P.O.:240; I.V.:964; IV Piggyback:1000]  Out: 2400 [Urine:2400]    Risk Assessment:   Sensory Perception: 3-->slightly limited  Moisture: 3-->occasionally moist  Activity: 2-->chairfast  Mobility: 3-->slightly limited  Nutrition: 3-->adequate  Friction and Shear: 2-->potential problem  Ben Score: 16                          Labs: Recent Labs   Lab 04/07/22  0754   HGB 11.5*   WBC  11.0       Physical Exam  Areas of skin assessed: focused on ble -- no wounds, intriginous wounds in leg folds pannus with left wound in pannus    Wound Location:  Left pannus intriginous   Date of last photo none   Wound History: chronic recurring   Wound Base: 100 % dermis     Palpation of the wound bed: normal      Drainage: moderate     Description of drainage: serosanguinous     Measurements (length x width x depth, in cm) 4  x 10  x  0.1 cm      Tunneling N/A     Undermining N/A  Periwound skin: macerated and rash irritant dermatitis       Color: red      Temperature: hot  Odor: strong  Pain: moderate, burning      Interventions  Visual inspection and assessment completed yes  Wound Care Rationale Protect periwound skin, Improve absorptive capacity, Decrease bacterial load and Pain reduction  Wound Care: completed by RN  Supplies: ordered: interdry ag and silver cell  Current off-loading measures: Pillows under calves  Current support surface: Bariatric Foam mattress  Education provided to: plan of care, Moisture management and Hygiene  Discussed plan of care with Patient, Family and Nurse    Krysta Moyer RN BS [facility]OCN

## 2022-04-07 NOTE — CONSULTS
NEPHROLOGY CONSULTATION    CC: acute renal failure    REASON FOR CONSULTATION: We are asked to see pt by .      HISTORY OF PRESENT ILLNESS:46 year old female with past medical history significant for morbid obesity, congestive heart failure, chronic lymphedema of lower extremities, hypertension, pulmonary embolism on chronic articulation of Xarelto, nephrolithiasis, recurrent UTIs, obstructive sleep apnea on CPAP at night and multiple sclerosis who was admitted to [facility] on April 6 for further evaluation and management of abnormal labs.  Patient accompanied by mother at the bedside.  Patient recently 02/20-02/27/2022 hospitalized at [facility] with urinary sepsis and obstructing ureteral stones.  Patient underwent cystoscopy and right ureteral stent placement.  Hospitalization was complicated by acute kidney injury likely multifactorial  etiology, secondary to prerenal etiology from septic shock in settings of diuretics and ACE inhibitor's use and obstructive uropathy.  Patient serum creatinine peaked at 4.46 mg/dL, that improved to baseline 0.7 mg/dL.  Patient was discharged home on oral Bumex 1 mg daily chronic lower extremity edema.  Lisinopril was held during hospitalization.  I did recommend to continue to hold lisinopril on discharge.  However it was resumed.  Patient states that about 3 weeks ago Bumex dose was increased to 2 mg twice a day by congestive heart failure clinic due to fluctuation in patient's weight.  Patient states that her systolic blood pressure in the 70s at home since.  She denies lightheadedness or dizziness.  Patient states that about 2 weeks ago lisinopril dose was decreased from 10 mg to 5 mg daily her primary care doctor, then stopped by cardiology service about a week ago.  On April 5 patient was seen by Dr. Newell for follow-up visit.  Patient laboratory work-up showed elevated serum creatinine of 6.2 mg/dL, azotemia/BUN of 164 mg/dL, hyponatremia serum  sodium 126.  Patient was advised to go to emergency room for further evaluation.  On admission patient was hemodynamically stable. /55,  bpm, RR 15, Temp 98F.  Urinalysis was suggestive of UTI.  Urine culture still pending.  Patient was started on IV antibiotics.  CT abdomen and pelvis showed no renal or ureteral stones on either side.  Patient reports cloudy urine for last week or 2.  Patient stated that she is on fluid restrictions of 60 ounces per 24 hours but she has been drinking about 42 ounces daily.  Patient reports tender spots on the skin of both shins.  Patient states that she had diarrhea for  2 days prior to admission. Resolved. No BM since admission.  Patient denies: fatigue, fever, chills, weight loss, dizziness, adenopathy, sore throat, rhinorrhea, cough, shortness of breath , chest pain, palpitations, orthopnea, nausea, vomiting, abdominal pain, dysuria, urinary frequency, urgency, hematuria, rash.  At the time of physical exam, the patient stated that she is feeling fine.     REVIEW OF SYSTEMS:  ROS was completely reviewed and otherwise negative and non-contributory    Past Medical History:   Diagnosis Date     Acute on chronic diastolic congestive heart failure (H) 2/9/2022     Arthritis 2019    Hips     ASCUS favor benign 8/2015    Neg HPV     Depressive disorder 2010     H/O colposcopy with cervical biopsy 9/14/15    Bx & ECC - negative     Hypertension 2019     LSIL on Pap smear 7/2014    Neg high risk HPV     Multiple sclerosis (H) 8/29/2005 9/18/200pt dx with MS 2004--dx by neurolgist and is followed by Dr. Steven Ayala 10/2016     UNSPEC CONSTIPATION 9/18/2006 9/18/2006   Has tried otc suppository and otc lax.        Social History     Socioeconomic History     Marital status: Single     Spouse name: Not on file     Number of children: Not on file     Years of education: Not on file     Highest education level: Not on file   Occupational History     Employer:  [employer]   Tobacco Use     Smoking status: Never Smoker     Smokeless tobacco: Never Used   Vaping Use     Vaping Use: Never used   Substance and Sexual Activity     Alcohol use: Yes     Comment: social     Drug use: No     Sexual activity: Not Currently     Partners: Male     Birth control/protection: Pill   Other Topics Concern     Parent/sibling w/ CABG, MI or angioplasty before 65F 55M? No   Social History Narrative    , 3rd-5th grade     Social Determinants of Health     Financial Resource Strain: Low Risk      Difficulty of Paying Living Expenses: Not very hard   Food Insecurity: No Food Insecurity     Worried About Running Out of Food in the Last Year: Never true     Ran Out of Food in the Last Year: Never true   Transportation Needs: No Transportation Needs     Lack of Transportation (Medical): No     Lack of Transportation (Non-Medical): No   Physical Activity: Inactive     Days of Exercise per Week: 0 days     Minutes of Exercise per Session: 0 min   Stress: Not on file   Social Connections: Unknown     Frequency of Communication with Friends and Family: Twice a week     Frequency of Social Gatherings with Friends and Family: Twice a week     Attends Anabaptist Services: Not on file     Active Member of Clubs or Organizations: Not on file     Attends Club or Organization Meetings: Not on file     Marital Status: Not on file   Intimate Partner Violence: Not At Risk     Fear of Current or Ex-Partner: No     Emotionally Abused: No     Physically Abused: No     Sexually Abused: No   Housing Stability: Not on file       Family History   Problem Relation Age of Onset     Neurologic Disorder Father         MS     Heart Disease Father         irregular heart rate     Diabetes Father      Melanoma Father      Sleep Apnea Father      Other Cancer Father      Cancer Maternal Grandfather         lung     Other Cancer Maternal Grandfather      Cancer Paternal Grandmother         stomach      Other Cancer Paternal Grandmother      Cancer Mother      Other Cancer Mother      Thyroid Disease Mother      Gynecology Maternal Aunt         PCOS     Hypertension Maternal Grandmother      Anxiety Disorder Maternal Grandmother      Thyroid Disease Maternal Grandmother      Anxiety Disorder Sister        Allergies   Allergen Reactions     Nkda [No Known Drug Allergies]        MEDICATIONS:    cefTRIAXone  1 g Intravenous Q24H     gabapentin  100 mg Oral TID     [Held by provider] metoprolol succinate ER  25 mg Oral Daily     miconazole   Topical BID     midodrine  2.5 mg Oral TID w/meals     sertraline  100 mg Oral Daily     sodium chloride (PF)  3 mL Intracatheter Q8H         PHYSICAL EXAM    BP (!) 60/38 (BP Location: Right arm, Patient Position: Semi-[place]'s)   Pulse 107   Temp 98.2  F (36.8  C) (Oral)   Resp 18   Wt 101.6 kg (224 lb)   SpO2 90%   BMI 38.45 kg/m        Intake/Output Summary (Last 24 hours) at 4/7/2022 1236  Last data filed at 4/7/2022 0916  Gross per 24 hour   Intake 2399 ml   Output 2400 ml   Net -1 ml       Alert/oriented x 3; awake and NAD, morbidly obese  HEENT NC/AT; perrla; OP clear without lesions; mmm  Neck supple without LAD, TM  CV; RRR without rub or murmur  Lung: clear and equal; no extra sounds  Ab: obese, soft and NT; not distended; normal bs  Ext: Woody edema of both lower extremities  Skin; no rash, erythema on anterior shins consistent with stasis dermatitis, there are several subcutaneous tender to plapation lesions on both anterior shins.  Neuro; grossly intact, no asterexis    LABORATORIES    Recent Labs   Lab 04/07/22  0754 04/06/22  1107   WBC 11.0 9.6   HGB 11.5* 13.3   HCT 34.7* 40.3    236     Recent Labs   Lab 04/07/22  0754 04/06/22  2048 04/06/22  1107   * 128* 127*   CO2 17* 18* 22   * 155* 158*     No results for input(s): INR, PTT in the last 168 hours.    Invalid input(s): APTT  Invalid input(s): FERRITIN  No results for input(s):  IRON in the last 168 hours.    Invalid input(s): TIBC    I reviewed all labs    ASSESSMENT/PLAN:  46 year old female with past medical history significant for morbid obesity, lesser congestive heart failure, chronic lymphedema of lower extremities, hypertension, nephrolithiasis, NARCISA on CPAP and multiple sclerosis who presented for further evaluation of abnormal labs. Nephrology service is consulted for acute kidney injury.     Non oliguric acute renal failure-likely secondary to prerenal etiology from persistent hypotension in settings of diuretics and ACE inhibitor's use. BUN/Cr ratio is >20.  CT abd/pelvis showed no evidence of obstructive uropathy.  Patient baseline serum creatinine 0.7-0.8 mg/dL.  Patient presents with creatinine of 6.65 mg/dL. Today Serum creatinine is trending down to 5.74 mg/dL .  Urine output in nonoliguric range, patient put out 1700 ml yesterday and 700cc since midnight.  UA 04/06 suggestive UTI. Urine culture is pending.  Patient is history of intermittent microscopic hematuria he is going back to June 2011.  Likely secondary to nephrolithiasis.  Urinalysis on February 80,022 showed 22 red blood cells per hpf, no proteinuria.  Urinalysis in March 2021 showed 120 g/g of protein and 22 red blood cells per hpf.  No indication for initiation of dialysis.  We will reevaluate daily.  I discussed in length wit the patient and her mother the risks and benefits of dialysis and,  signs and symptoms of uremia. All questions were answered to satisfaction.  Recs;  Check total CK  Continue IV hydration with NS at 125 ml/h  Start on Midodrine 5 mg TID to keep MAP>65  Continue to hold prior to admission Bumex   Monitor renal function and urine output daily  Avoid nephrotoxins and renally dose medications.       Hyponatremia-Moderate on admisson. Could be secondary to dehydration.   TSH and cortisol levels are wnl.  Serum sodium is trending up to 128 this am with IVF. Trend serum sodium daily.    History  of hypomagnesemia- Mg level was wnl at 2.0 on .     Metabolic acidosis-likely secondary to HUNG.  Serum bicarbonate is trended down from 18 to 17 this morning. Start on sodium bicarbonate 1300 mg PO BID. Trend bicarb daily.     Hypotension-patient reports persistent asymptomatic hypotension SBP in 70s at home for last 2 weeks at home. This am, patient's BP dropped to 60/38.  Receiving 2L of NS bolus.  Recommend to start Midodrine 5 mg PO TID to keep MAP 65  Continue to hold prior to admission lisinopril and Bumex       Volume status-hard to assess given patient body habitus.  Patient with history of diastolic just of heart failure. 2D ECHO 22 showed LVEF of 65%, moderately  right ventricular systolic function.  Prior admission was taken 2 mg of Bumex twice a day.  Continue to hold prior to admission diuretics given acute kidney injury.  -monitor I/O  -Daily weight  -Low-sodium diet    UTI-urine culture is pending. On IV Ceftriaxone. Management as per primary service.    Skin lesions-patient is complaining of painful subcutaneous lesions on both shins. Several subcutaneous tender to plapation lesions on both anterior shins appreciated on exam.  -I will order NM 3 phase scan to rule out calciphylaxis  -check serum phosphorus and PTH      Normocytic anemia-patient presented with normal Hgb of 13.3 g/dl. Hgb is trending down to 11.5 g/dl this am. Likely dilutional from IVF. No signs of active bleeding.    Chronic Lymphedema of lower extremities-lymphedema service consulted     History of hypertension-prior to admission patient was taken Metoprolol succinate 25 mg daily and Bumex 2 mg BID. Currently PTA antihypertensive medications on hold given hypotension.    Neuropathy-PTA on Gabapentin 200 mg TID. Gabapentin dose reduced to 100 mg TID given severe HUNG.        History of pulmonary embolism-on chronic anticoagulation with Xarelto     History of sleep apnea- on CPAP at night.    Thank you for your  consultation. We will follow      Jodee Clark MD  [facility]  [address], [address]  [place] [address]  Phone# [phone]  Fax# [phone]

## 2022-04-07 NOTE — PLAN OF CARE
Problem: Plan of Care - These are the overarching goals to be used throughout the patient stay.    Goal: Plan of Care Review/Shift Note  Outcome: Ongoing, Progressing   Goal Outcome Evaluation:  Pt Came to the unit around 1800. Alert and oriented x 4. Pt is obese. Legs are red edematous and dry. Pt ate 100% of supper. Pt C/O bilateral leg pain. Tylenol give at 1900. At 2000. Pt C/O left side chest pain. HO called. MD came and saw pt. EKG and troponin ordered. Troponin is normal. Pt put and tele. Tele reads sinus tachycardia. Pt had no BM. Pt is on her period. 700 out of purewick . Pt is sleeping with cpap machine on 2 liters of oxygen.     Renetta Cano RN

## 2022-04-08 ENCOUNTER — APPOINTMENT (OUTPATIENT)
Dept: NUCLEAR MEDICINE | Facility: HOSPITAL | Age: 47
DRG: 683 | End: 2022-04-08
Attending: INTERNAL MEDICINE
Payer: COMMERCIAL

## 2022-04-08 ENCOUNTER — APPOINTMENT (OUTPATIENT)
Dept: PHYSICAL THERAPY | Facility: HOSPITAL | Age: 47
DRG: 683 | End: 2022-04-08
Attending: INTERNAL MEDICINE
Payer: COMMERCIAL

## 2022-04-08 LAB
ANION GAP SERPL CALCULATED.3IONS-SCNC: 17 MMOL/L (ref 5–18)
BUN SERPL-MCNC: 141 MG/DL (ref 8–22)
CALCIUM SERPL-MCNC: 8.6 MG/DL (ref 8.5–10.5)
CHLORIDE BLD-SCNC: 100 MMOL/L (ref 98–107)
CO2 SERPL-SCNC: 19 MMOL/L (ref 22–31)
CREAT SERPL-MCNC: 4.42 MG/DL (ref 0.6–1.1)
GFR SERPL CREATININE-BSD FRML MDRD: 12 ML/MIN/1.73M2
GLUCOSE BLD-MCNC: 106 MG/DL (ref 70–125)
GLUCOSE BLDC GLUCOMTR-MCNC: 107 MG/DL (ref 70–99)
POTASSIUM BLD-SCNC: 4.1 MMOL/L (ref 3.5–5)
PTH-INTACT SERPL-MCNC: 839 PG/ML (ref 10–86)
SODIUM SERPL-SCNC: 136 MMOL/L (ref 136–145)

## 2022-04-08 PROCEDURE — 99233 SBSQ HOSP IP/OBS HIGH 50: CPT | Performed by: INTERNAL MEDICINE

## 2022-04-08 PROCEDURE — 120N000001 HC R&B MED SURG/OB

## 2022-04-08 PROCEDURE — 250N000013 HC RX MED GY IP 250 OP 250 PS 637: Performed by: INTERNAL MEDICINE

## 2022-04-08 PROCEDURE — 97530 THERAPEUTIC ACTIVITIES: CPT | Mod: GP | Performed by: PHYSICAL THERAPIST

## 2022-04-08 PROCEDURE — 343N000001 HC RX 343: Performed by: INTERNAL MEDICINE

## 2022-04-08 PROCEDURE — 999N000157 HC STATISTIC RCP TIME EA 10 MIN

## 2022-04-08 PROCEDURE — 36415 COLL VENOUS BLD VENIPUNCTURE: CPT | Performed by: INTERNAL MEDICINE

## 2022-04-08 PROCEDURE — 78306 BONE IMAGING WHOLE BODY: CPT

## 2022-04-08 PROCEDURE — 250N000011 HC RX IP 250 OP 636: Performed by: INTERNAL MEDICINE

## 2022-04-08 PROCEDURE — A9503 TC99M MEDRONATE: HCPCS | Performed by: INTERNAL MEDICINE

## 2022-04-08 PROCEDURE — 80048 BASIC METABOLIC PNL TOTAL CA: CPT | Performed by: INTERNAL MEDICINE

## 2022-04-08 PROCEDURE — 97162 PT EVAL MOD COMPLEX 30 MIN: CPT | Mod: GP | Performed by: PHYSICAL THERAPIST

## 2022-04-08 RX ORDER — HEPARIN SODIUM 5000 [USP'U]/.5ML
5000 INJECTION, SOLUTION INTRAVENOUS; SUBCUTANEOUS EVERY 8 HOURS
Status: COMPLETED | OUTPATIENT
Start: 2022-04-08 | End: 2022-04-11

## 2022-04-08 RX ORDER — TC 99M MEDRONATE 20 MG/10ML
24-25 INJECTION, POWDER, LYOPHILIZED, FOR SOLUTION INTRAVENOUS ONCE
Status: COMPLETED | OUTPATIENT
Start: 2022-04-08 | End: 2022-04-08

## 2022-04-08 RX ORDER — SEVELAMER CARBONATE 800 MG/1
800 TABLET, FILM COATED ORAL
Status: DISCONTINUED | OUTPATIENT
Start: 2022-04-08 | End: 2022-04-12 | Stop reason: HOSPADM

## 2022-04-08 RX ADMIN — SODIUM BICARBONATE 1300 MG: 648 TABLET ORAL at 08:45

## 2022-04-08 RX ADMIN — SEVELAMER CARBONATE 800 MG: 800 TABLET, FILM COATED ORAL at 17:46

## 2022-04-08 RX ADMIN — CEFTRIAXONE SODIUM 1 G: 1 INJECTION, POWDER, FOR SOLUTION INTRAMUSCULAR; INTRAVENOUS at 13:53

## 2022-04-08 RX ADMIN — SERTRALINE HYDROCHLORIDE 100 MG: 50 TABLET ORAL at 08:45

## 2022-04-08 RX ADMIN — MICONAZOLE NITRATE: 20 POWDER TOPICAL at 08:51

## 2022-04-08 RX ADMIN — MIDODRINE HYDROCHLORIDE 5 MG: 5 TABLET ORAL at 13:52

## 2022-04-08 RX ADMIN — GABAPENTIN 100 MG: 100 CAPSULE ORAL at 20:34

## 2022-04-08 RX ADMIN — GABAPENTIN 100 MG: 100 CAPSULE ORAL at 13:52

## 2022-04-08 RX ADMIN — SODIUM BICARBONATE 1300 MG: 648 TABLET ORAL at 20:34

## 2022-04-08 RX ADMIN — MICONAZOLE NITRATE: 20 POWDER TOPICAL at 22:35

## 2022-04-08 RX ADMIN — TC 99M MEDRONATE 24.5 MCI.: 20 INJECTION, POWDER, LYOPHILIZED, FOR SOLUTION INTRAVENOUS at 09:52

## 2022-04-08 RX ADMIN — HEPARIN SODIUM 5000 UNITS: 10000 INJECTION, SOLUTION INTRAVENOUS; SUBCUTANEOUS at 14:53

## 2022-04-08 RX ADMIN — MIDODRINE HYDROCHLORIDE 5 MG: 5 TABLET ORAL at 08:45

## 2022-04-08 RX ADMIN — HEPARIN SODIUM 5000 UNITS: 10000 INJECTION, SOLUTION INTRAVENOUS; SUBCUTANEOUS at 21:55

## 2022-04-08 RX ADMIN — GABAPENTIN 100 MG: 100 CAPSULE ORAL at 08:45

## 2022-04-08 ASSESSMENT — ACTIVITIES OF DAILY LIVING (ADL)
ADLS_ACUITY_SCORE: 16
ADLS_ACUITY_SCORE: 14
ADLS_ACUITY_SCORE: 16
ADLS_ACUITY_SCORE: 14
ADLS_ACUITY_SCORE: 16
ADLS_ACUITY_SCORE: 14

## 2022-04-08 NOTE — PROGRESS NOTES
RENAL PROGRESS NOTE    CC:  Acute renal failure.    ASSESSMENT & PLAN:   46 year old female with past medical history significant for morbid obesity, lesser congestive heart failure, chronic lymphedema of lower extremities, hypertension, nephrolithiasis, NARCISA on CPAP and multiple sclerosis who presented for further evaluation of abnormal labs. Nephrology service is consulted for acute kidney injury.     Non oliguric acute renal failure-likely secondary to prerenal etiology from persistent hypotension in settings of diuretics and ACE inhibitor's use. BUN/Cr ratio is >20.  CT abd/pelvis showed no evidence of obstructive uropathy.  Patient baseline serum creatinine 0.7-0.8 mg/dL.  Patient presents with creatinine of 6.65 mg/dL. Today Serum creatinine is trending down to 4.4 mg/dL .  Urine output in nonoliguric range, patient put out 2800 ml yesterday and 2400cc since midnight without diuretics.  UA 04/06 suggestive UTI. Urine culture grew gram-negative bacilli and gram-positive cocci's, identification susceptibility still pending.  Total CK was wnl.  Patient is history of intermittent microscopic hematuria he is going back to June 2011.  Likely secondary to nephrolithiasis.  Urinalysis on February 8,022 showed 22 red blood cells per hpf, no proteinuria.  Urinalysis in March 2021 showed 120 g/g of protein and 22 red blood cells per hpf.  No indication for initiation of dialysis.  We will reevaluate daily.  Continue IV hydration with NS at 125 ml/h  Continue Midodrine 5 mg TID to keep MAP>65, hold if SBP<110  Continue to hold prior to admission Bumex   Monitor renal function and urine output daily  Avoid nephrotoxins and renally dose medications.        Hyponatremia-Moderate on admisson. Could be secondary to dehydration.   TSH and cortisol levels are wnl.  Serum sodium is trending up to 136 this am with IVF. Trend serum sodium daily.     History of hypomagnesemia- Mg level was wnl at 2.0 on 04/06.      Metabolic  acidosis-likely secondary to HUNG.  Serum bicarbonate is trended up from 17 to 19 this morning. Continue supplementation with sodium bicarbonate 1300 mg PO BID. Trend bicarb daily.     Hypotension-patient reports persistent asymptomatic hypotension SBP in 70s at home for last 2 weeks at home. Improved with IV and scheduled Midodrine 5 mg PO TID to keep MAP 65  Continue to hold prior to admission lisinopril and Bumex        Volume status-hard to assess given patient body habitus.Net 4.5 L negative since admission.  Patient with history of diastolic just of heart failure. 2D ECHO 22 showed LVEF of 65%, moderately  right ventricular systolic function.  Prior admission was taken 2 mg of Bumex twice a day.  Continue to hold prior to admission diuretics given acute kidney injury.  -monitor I/O  -Daily weight  -Low-sodium diet     UTI-urine culture grew GNR and GPC. On IV Ceftriaxone. Management as per primary service.     Skin lesions-patient is complaining of painful subcutaneous lesions on both shins. Several subcutaneous tender to palpation lesions on both anterior shins appreciated on exam.  -NM 3 phase scan to rule out calciphylaxis is scheduled for today    Hyperphosphatemia serum phosphorus is elevated at 8.5.  Likely secondary to HUNG.  PTH is pending.  Start on  noncalcium based phosphorus binders sevelamer 800 mg with meals.  Renal diet.     Normocytic anemia-patient presented with normal Hgb of 13.3 g/dl. Hgb was trending down to 11.5 g/dl . Likely dilutional from IVF. No signs of active bleeding.     Chronic Lymphedema of lower extremities-lymphedema service consulted     History of hypertension-prior to admission patient was taken Metoprolol succinate 25 mg daily and Bumex 2 mg BID. Currently PTA antihypertensive medications on hold given hypotension.     Neuropathy-PTA on Gabapentin 200 mg TID. Gabapentin dose reduced to 100 mg TID given severe HUNG.        History of pulmonary embolism-on  chronic anticoagulation with Xarelto     History of sleep apnea- on CPAP at night.     We will follow       SUBJECTIVE:    No acute issues overnight.  Patient said that she is feeling better this morning.  Patient reports generalized weakness.  Patient denies: fever, chills, dizziness, cough, shortness of breath , chest pain, palpitations, orthopnea, nausea, vomiting, abdominal pain, changes in bowel habits, dysuria, urinary frequency, urgency, hematuria, rash.  Patient accompanied by sister at the bedside.  Updated on labs.  All questions answered.      OBJECTIVE:  Physical Exam   Temp: 98.2  F (36.8  C) Temp src: Oral BP: 111/55 Pulse: 99   Resp: 20 SpO2: 95 % O2 Device: None (Room air)    Vitals:    04/06/22 0927   Weight: 101.6 kg (224 lb)     Vital Signs with Ranges  Temp:  [98  F (36.7  C)-98.8  F (37.1  C)] 98.2  F (36.8  C)  Pulse:  [] 99  Resp:  [16-20] 20  BP: ()/(38-56) 111/55  SpO2:  [92 %-95 %] 95 %  I/O last 3 completed shifts:  In: 195 [I.V.:195]  Out: 3200 [Urine:3200]    @TMAXR(24)@    Patient Vitals for the past 72 hrs:   Weight   04/06/22 0927 101.6 kg (224 lb)   [unfilled]    PHYSICAL EXAM:  Alert/oriented x 3; awake and NAD, morbidly obese  HEENT NC/AT; perrla; OP clear without lesions; mmm  Neck supple without LAD, TM  CV; RRR without rub or murmur  Lung: clear and equal; no extra sounds  Ab: obese, soft and NT; not distended; normal bs  Ext: Woody edema of both lower extremities  Skin; no rash, erythema on anterior shins consistent with stasis dermatitis, there are several subcutaneous tender to plapation lesions on both anterior shins.  Neuro; grossly intact, no asterixis    LABORATORY STUDIES:     Recent Labs   Lab 04/07/22  0754 04/06/22  1107   WBC 11.0 9.6   RBC 4.27 4.92   HGB 11.5* 13.3   HCT 34.7* 40.3    236       Basic Metabolic Panel:  Recent Labs   Lab 04/08/22  0715 04/07/22  0754 04/06/22  2048 04/06/22  1107 04/05/22  1609    128* 128* 127* 126*    POTASSIUM 4.1 4.7 4.1 4.9 4.7   CHLORIDE 100 93* 89* 85* 85*   CO2 19* 17* 18* 22 21   * 151* 155* 158* 164*   CR 4.42* 5.74* 5.98* 6.65* 6.20*    110 130* 90 96   EVITA 8.6 8.7 9.3 9.9 10.1       INRNo lab results found in last 7 days.     Recent Labs   Lab Test 04/07/22  0754 04/06/22  1107 02/14/22  0510 02/11/22  0502   INR  --   --   --  1.23*   WBC 11.0 9.6   < > 5.9   HGB 11.5* 13.3   < > 12.6    236   < > 219    < > = values in this interval not displayed.       Personally reviewed current labs     ~ 35 minutes spent in exam, POC, education regarding renal disease and management.  >90% time spent in education and counseling and/or discussion with patient's care team    Jodee Clark MD  [facility]  [address] [place] [address], [address]  [place] [address]  Phone# [phone]  Fax# [phone]

## 2022-04-08 NOTE — PLAN OF CARE
"  Problem: Plan of Care - These are the overarching goals to be used throughout the patient stay.    Goal: Plan of Care Review/Shift Note  Description: The Plan of Care Review/Shift note should be completed every shift.  The Outcome Evaluation is a brief statement about your assessment that the patient is improving, declining, or no change.  This information will be displayed automatically on your shift note.  Outcome: Ongoing, Progressing     Problem: Plan of Care - These are the overarching goals to be used throughout the patient stay.    Goal: Patient-Specific Goal (Individualized)  Description: You can add care plan individualizations to a care plan. Examples of Individualization might be:  \"Parent requests to be called daily at 9am for status\", \"I have a hard time hearing out of my right ear\", or \"Do not touch me to wake me up as it startles me\".  Outcome: Ongoing, Progressing   Goal Outcome Evaluation:    Continuing to watch BP closely, remain soft but consistent with pts hospital course. Continue to monitor creat levels. Dressing on skin folds to be change 4/8 evening. Pt wore CPAP overnight.       "

## 2022-04-08 NOTE — PLAN OF CARE
Goal Outcome Evaluation:    Problem: Muscle Strength Impairment  Goal: Improved Muscle Strength  Outcome: Ongoing, Not Progressing       Pt having increased difficulty transferring in and out of bed. Per pt, 2 OT staff were required for morning transfer, baseline pt tranfers independently. Pt moved to a room with ceiling lift.

## 2022-04-08 NOTE — PROGRESS NOTES
Daily Progress Note        CODE STATUS:  Full Code    04/07/22  Assessment/Plan:  46 year old female with PMH of hypertension, CHF, nephrolithiasis, PE on xarelto, NARCISA, multiple sclerosis, chronic lymphedema who was referred to our ED by her cardiology clinic for evaluation of abnormal labs.      Acute renal failure:  High AG metabolic acidosis:  -- Presented with creatinine of 6.6. It was normal in Feb 2022.  Likely pre-renal due to overdiuresis. Bumex has recently been increased per patient. Hypotension might have contributed to this as the patient reports low BP at home for the last couple of wks. Total CK normal  -- Improving. Creatinine 6.6-->5.7->4.4. CT shows no hydronephrosis  -- Holding bumex and ACEI  -- Gentle hydration. IVF rate increased to 125ml/hr on 4/7. Brar for accurate I/O.  -- Nephrology consulted. Appreciate input     Hypotension:  -- Lactic acid is normal. Holding metoprolol  -- Given NS bolus 500ml x 2 on 4/7. Started on Midodrine. Patient reports no lightheadedness. No chest pain or shortness of breath  -- Cont with IVF.   -- I donot suspect PE as the patient had been on Xarelto, and has not been hypoxic  -- Checked cortisol level and TSH- normal. Blood cultures- NTD    Hyponatremia:  -- Sodium of 127 noted. Suspect due to intravascular vol depletion from overdiuresis  -- Improved with IVF.     UTI:  -- Abnormal UA noted  -- Agree with IV rocephin awaiting culture report/ U/C growing GNB and GPC     Diarrhea:  -- No diarrhea since admission  -- Likely viral GE.      H/O PE:  -- Holding xarelto due to low GFR. Use prophylactic heparin for now     HFpEF:  -- BNP is elevated in the context of renal failure. I dont belive she is in acute CHF  -- Holding diuretics for now     NARCISA:  -- CPAP at night     Lymphedema:  -- Consult lymphedema PT/OT      Disposition; Several days.Needs TCU.  Barrier to discharge; HUNG, hypotension      Subjective:  Interval History: patient reports doing about the same.  Denies having any shortness of breath. No chest pain or palpitations. Felt very weak while working with therapy today    Review of Systems:   As mentioned in subjective.    Patient Active Problem List   Diagnosis     Multiple sclerosis (H)     Polycystic ovaries     Constipation     CARDIOVASCULAR SCREENING; LDL GOAL LESS THAN 160     Anxiety     Restless legs     Leg edema     Eating disorder     Papanicolaou smear of cervix with low grade squamous intraepithelial lesion (LGSIL)     Morbid obesity (H)     Peroneal tendon rupture     Benign essential hypertension     NARCISA (obstructive sleep apnea)     Moderate episode of recurrent major depressive disorder (H)     Cellulitis of abdominal wall     Sepsis (H)     History of abnormal cervical Pap smear     Generalized anxiety disorder     Lymphedema of both lower extremities     Tachycardia     SIRS (systemic inflammatory response syndrome) (H)     Cellulitis of right leg     Cellulitis     Pneumonia due to infectious organism, unspecified laterality, unspecified part of lung     Acute pulmonary embolism without acute cor pulmonale, unspecified pulmonary embolism type (H)     COVID-19     Chronic diastolic congestive heart failure (H)     Acute respiratory failure with hypoxia (H)     NSTEMI (non-ST elevated myocardial infarction) (H)     Dehydration     Hyperkalemia     Acute renal failure, unspecified acute renal failure type (H)     Hypotension due to hypovolemia     History of pulmonary embolism     Ureteral stone     Urinary tract infection     Hydronephrosis with urinary obstruction due to ureteral calculus     Chronic heart failure with preserved ejection fraction (H)     Splenomegaly     Hyponatremia     Renal insufficiency       Scheduled Meds:    cefTRIAXone  1 g Intravenous Q24H     gabapentin  100 mg Oral TID     [Held by provider] metoprolol succinate ER  25 mg Oral Daily     miconazole   Topical BID     midodrine  5 mg Oral TID w/meals     sertraline  100  mg Oral Daily     sodium bicarbonate  1,300 mg Oral BID     sodium chloride (PF)  3 mL Intracatheter Q8H     technetium Tc 99m medronate  24-25 millicurie Intravenous Once     Continuous Infusions:    PRN Meds:.acetaminophen, lidocaine 4%, lidocaine (buffered or not buffered), melatonin, sodium chloride (PF)    Objective:  Vital signs in last 24 hours:  Temp:  [98  F (36.7  C)-98.8  F (37.1  C)] 98.3  F (36.8  C)  Pulse:  [] 88  Resp:  [16-18] 18  BP: ()/(38-56) 95/52  SpO2:  [92 %-95 %] 95 %        Intake/Output Summary (Last 24 hours) at 4/7/2022 1251  Last data filed at 4/7/2022 0916  Gross per 24 hour   Intake 2399 ml   Output 2400 ml   Net -1 ml       Physical Exam:  HEENT: NC/AT                 Eyes-  Pupil round and reactive to light bilaterally                  Neck- supple, no JVP elevation,                  Sclera- anicteric                 Oropharynx- moist and pink  CHEST: Clear to auscultation bilateral anteriorly, no ronchi or wheezing  HEART: S1S2 normal, regular.   ABDMN: Soft. Non-tender, non-distended.  No guarding or rigidity. Bowel sounds- active  EXTRM: Chronic LE lymphedema. Chronic skin erythema on both sides, left >right. Better today.  NEURO: Alert and awake. Cranial nerves II-XII grossly intact. No focal neurological deficit.    Lab Results:(I have personally reviewed the results)    Recent Results (from the past 24 hour(s))   Lactic acid whole blood    Collection Time: 04/07/22 11:57 AM   Result Value Ref Range    Lactic Acid 1.2 0.7 - 2.0 mmol/L   Phosphorus    Collection Time: 04/07/22 10:41 PM   Result Value Ref Range    Phosphorus 8.5 (H) 2.5 - 4.5 mg/dL   Basic metabolic panel    Collection Time: 04/08/22  7:15 AM   Result Value Ref Range    Sodium 136 136 - 145 mmol/L    Potassium 4.1 3.5 - 5.0 mmol/L    Chloride 100 98 - 107 mmol/L    Carbon Dioxide (CO2) 19 (L) 22 - 31 mmol/L    Anion Gap 17 5 - 18 mmol/L    Urea Nitrogen 141 (H) 8 - 22 mg/dL    Creatinine 4.42 (H) 0.60  - 1.10 mg/dL    Calcium 8.6 8.5 - 10.5 mg/dL    Glucose 106 70 - 125 mg/dL    GFR Estimate 12 (L) >60 mL/min/1.73m2       All laboratory and imaging data in the past 24 hours reviewed  Serum Glucose range:   Recent Labs   Lab 04/08/22  0715 04/07/22  0754 04/06/22  2048 04/06/22  1107    110 130* 90     ABG: No lab results found in last 7 days.  CBC:   Recent Labs   Lab 04/07/22 0754 04/06/22  1107   WBC 11.0 9.6   HGB 11.5* 13.3   HCT 34.7* 40.3   MCV 81 82    236   NEUTROPHIL  --  82   LYMPH  --  8   MONOCYTE  --  8   EOSINOPHIL  --  2     Chemistry:   Recent Labs   Lab 04/08/22 0715 04/07/22 0754 04/06/22 2048    128* 128*   POTASSIUM 4.1 4.7 4.1   CHLORIDE 100 93* 89*   CO2 19* 17* 18*   * 151* 155*   CR 4.42* 5.74* 5.98*   GFRESTIMATED 12* 9* 8*   EVITA 8.6 8.7 9.3   MAG  --   --  2.0     Coags:  No results for input(s): INR, PROTIME, PTT in the last 168 hours.    Invalid input(s): APTT  Cardiac Markers:  Recent Labs   Lab 04/06/22 2048   TROPONINI 0.03          XR Chest 2 Views    Result Date: 3/23/2022  EXAM: XR CHEST 2 VW LOCATION: [facility] DATE/TIME: 3/23/2022 11:02 AM INDICATION: new cough x 3 4 days, recent pneumonia COMPARISON: 02/20/2022.     IMPRESSION: Shallow inspiration. Lungs are clear. Nothing for pneumonia on today's study. No new findings.    Abd/pelvis CT no contrast - Stone Protocol    Result Date: 4/6/2022  EXAM: CT ABDOMEN PELVIS W/O CONTRAST LOCATION: [facility] DATE/TIME: 4/6/2022 10:45 AM INDICATION: Diarrhea, dehydration, elevated creatinine and history of renal stones. COMPARISON: 02/21/2022. TECHNIQUE: CT scan of the abdomen and pelvis was performed without IV contrast. Multiplanar reformats were obtained. Dose reduction techniques were used. CONTRAST: None. FINDINGS: LOWER CHEST: Lung bases are clear. Dense calcification of the mitral annulus. HEPATOBILIARY: Mild diffuse decreased attenuation in liver  consistent with hepatic steatosis. No focal hepatic mass. Status post cholecystectomy. No biliary dilatation. PANCREAS: Normal. SPLEEN: Borderline splenomegaly with the spleen measuring 14.5 x 6.4 x 11.9 cm. No splenic lesions. ADRENAL GLANDS: Normal. KIDNEYS/BLADDER: Mild prominence of the right renal pelvis not significantly changed from the prior study. The stone that was seen in the right proximal ureter on the prior study is no longer present. There are no renal or ureteral stones on either side.  No left-sided hydronephrosis. No suspicious renal masses. No bladder stone or mass. BOWEL: No bowel obstruction or free intraperitoneal air. Scattered colonic diverticula without evidence for diverticulitis or abscess. Normal appendix. LYMPH NODES: There are normal-sized retroperitoneal lymph nodes no lymphadenopathy. VASCULATURE: Unremarkable. PELVIC ORGANS: Again noted is a 9.7 x 8.9 cm adnexal mass containing fat, soft tissue elements and calcification consistent with a mature teratoma. It appears unchanged from the prior study. This may arise from the left ovary but its large size puts it in the midline makes it difficult to say which ovary it arises from. MUSCULOSKELETAL: Mild degenerative change in the lumbar spine with grade 1 anterolisthesis of L4 on L5 likely secondary to facet arthropathy. No acute fracture or suspicious bony lesion.     IMPRESSION: 1.  The right proximal ureteral stone seen on the prior study is no longer present. There is mild prominence of the right upper collecting system but no stones or masses are seen in either kidney no left-sided hydronephrosis. 2.  Stable large mature teratoma (dermoid cyst) in the pelvis. 3.  No evidence for bowel obstruction, colitis, appendicitis or diverticulitis. 4.  Borderline splenomegaly.     XR KUB    Result Date: 3/23/2022  EXAM: XR KUB LOCATION: [facility] DATE/TIME: 3/23/2022 11:02 AM INDICATION:  Calculus of ureter COMPARISON:  CT 02/21/2022     IMPRESSION: Renal collecting systems are significantly obscured by large body habitus as well as stool and bowel. I do not see evidence of the right ureteral stone is seen on prior CT although this certainly could be obscured. Consider follow-up CT. Nothing for bowel obstruction. Calcifications in the pelvis consistent with the known dermoid seen on CT.      Latest radiology report personally reviewed.    Note created using dragon voice recognition software so sounds alike errors may have escaped editing.      04/08/2022   Bienvenido Morris MD  Hospitalist, [facility] [place]  Pager: [phone]

## 2022-04-08 NOTE — PROGRESS NOTES
"   04/08/22 0730   Quick Adds   Type of Visit Initial PT Evaluation   Living Environment   People in Home alone   Current Living Arrangements other (see comments)  ([facility])   Home Accessibility no concerns  (no stairs)   Self-Care   Equipment Currently Used at Home walker, rolling;wheelchair, manual   Fall history within last six months no   General Information   Onset of Illness/Injury or Date of Surgery 04/06/22   Referring Physician Bienvenido Morris MBBS   Patient/Family Therapy Goals Statement (PT) None stated.   Pertinent History of Current Problem (include personal factors and/or comorbidities that impact the POC) Per H&P: \"46 year old female with PMH of hypertension, CHF, nephrolithiasis, PE on xarelto, NARCISA, multiple sclerosis, chronic lymphedema who was referred to our ED by her cardiology clinic for evaluation of abnormal labs\"   Existing Precautions/Restrictions fall   Integumentary/Edema   Integumentary/Edema Comments B LEs hyperkeratotic, papillomas, discolored.    Range of Motion (ROM)   Range of Motion ROM is WFL   Strength (Manual Muscle Testing)   Strength (Manual Muscle Testing) strength is WFL   Bed Mobility   Bed Mobility supine-sit;sit-supine;scooting/bridging   Scooting/Bridging [place] (Bed Mobility) dependent (less than 25% patient effort);2 person assist   Supine-Sit [place] (Bed Mobility) moderate assist (50% patient effort);verbal cues   Sit-Supine [place] (Bed Mobility) maximum assist (25% patient effort);2 person assist   Bed Mobility Limitations decreased ability to use arms for pushing/pulling;decreased ability to use legs for bridging/pushing   Impairments Contributing to Impaired Bed Mobility impaired balance;decreased strength   Assistive Device (Bed Mobility) bed rails;draw sheet   Transfers   Transfers sit-stand transfer   Transfer Safety Concerns Noted decreased step length;decreased weight-shifting ability   Impairments Contributing to Impaired Transfers " impaired balance;decreased strength   Sit-Stand Transfer   Sit-Stand [place] (Transfers) moderate assist (50% patient effort);2 person assist   Assistive Device (Sit-Stand Transfers) walker, front-wheeled   Gait/Stairs (Locomotion)   [place] Level (Gait) moderate assist (50% patient effort);2 person assist;verbal cues   Assistive Device (Gait) walker, front-wheeled   Distance in Feet (Required for LE Total Joints) 2 side steps toward HOB   Pattern (Gait) step-to   Deviations/Abnormal Patterns (Gait) gait speed decreased;leela decreased;base of support, wide   Clinical Impression   Criteria for Skilled Therapeutic Intervention Yes, treatment indicated   PT Diagnosis (PT) impaired functional mobility   Influenced by the following impairments decreased strength, decreased endurance, impaired balance   Functional limitations due to impairments difficulty with transfers, ambulation   Clinical Presentation (PT Evaluation Complexity) Evolving/Changing   Clinical Presentation Rationale Pt presents as medically diagnosed.   Clinical Decision Making (Complexity) moderate complexity   Planned Therapy Interventions (PT) balance training;bed mobility training;gait training;home exercise program;neuromuscular re-education;patient/family education;strengthening;transfer training   Risk & Benefits of therapy have been explained evaluation/treatment results reviewed;participants voiced agreement with care plan;participants included;patient   PT Discharge Planning   PT Discharge Recommendation (DC Rec) Transitional Care Facility   PT Rationale for DC Rec Patient currently requiring assist of 2 for mobility.   Plan of Care Review   Plan of Care Reviewed With patient   Total Evaluation Time   Total Evaluation Time (Minutes) 15   Physical Therapy Goals   PT Frequency Daily   PT Predicated Duration/Target Date for Goal Attainment 04/15/22   PT Goals Bed Mobility;Transfers;Gait   PT: Bed Mobility Supine to/from sit;Minimal  assist   PT: Transfers Minimal assist;Sit to/from stand;Assistive device   PT: Gait Minimal assist;Assistive device;Rolling walker;50 feet     Thelma Peñaloza, PT  4/8/2022

## 2022-04-09 LAB
ANION GAP SERPL CALCULATED.3IONS-SCNC: 11 MMOL/L (ref 5–18)
BUN SERPL-MCNC: 109 MG/DL (ref 8–22)
CALCIUM SERPL-MCNC: 9.6 MG/DL (ref 8.5–10.5)
CHLORIDE BLD-SCNC: 105 MMOL/L (ref 98–107)
CO2 SERPL-SCNC: 26 MMOL/L (ref 22–31)
CREAT SERPL-MCNC: 2.73 MG/DL (ref 0.6–1.1)
GFR SERPL CREATININE-BSD FRML MDRD: 21 ML/MIN/1.73M2
GLUCOSE BLD-MCNC: 101 MG/DL (ref 70–125)
POTASSIUM BLD-SCNC: 3.9 MMOL/L (ref 3.5–5)
SODIUM SERPL-SCNC: 142 MMOL/L (ref 136–145)

## 2022-04-09 PROCEDURE — 99233 SBSQ HOSP IP/OBS HIGH 50: CPT | Performed by: INTERNAL MEDICINE

## 2022-04-09 PROCEDURE — 80048 BASIC METABOLIC PNL TOTAL CA: CPT | Performed by: INTERNAL MEDICINE

## 2022-04-09 PROCEDURE — 250N000013 HC RX MED GY IP 250 OP 250 PS 637: Performed by: INTERNAL MEDICINE

## 2022-04-09 PROCEDURE — 36415 COLL VENOUS BLD VENIPUNCTURE: CPT | Performed by: INTERNAL MEDICINE

## 2022-04-09 PROCEDURE — 99232 SBSQ HOSP IP/OBS MODERATE 35: CPT | Performed by: INTERNAL MEDICINE

## 2022-04-09 PROCEDURE — 250N000011 HC RX IP 250 OP 636: Performed by: INTERNAL MEDICINE

## 2022-04-09 PROCEDURE — 82306 VITAMIN D 25 HYDROXY: CPT | Performed by: INTERNAL MEDICINE

## 2022-04-09 PROCEDURE — 120N000001 HC R&B MED SURG/OB

## 2022-04-09 RX ADMIN — HEPARIN SODIUM 5000 UNITS: 10000 INJECTION, SOLUTION INTRAVENOUS; SUBCUTANEOUS at 14:22

## 2022-04-09 RX ADMIN — HEPARIN SODIUM 5000 UNITS: 10000 INJECTION, SOLUTION INTRAVENOUS; SUBCUTANEOUS at 06:07

## 2022-04-09 RX ADMIN — HEPARIN SODIUM 5000 UNITS: 10000 INJECTION, SOLUTION INTRAVENOUS; SUBCUTANEOUS at 21:38

## 2022-04-09 RX ADMIN — SEVELAMER CARBONATE 800 MG: 800 TABLET, FILM COATED ORAL at 13:42

## 2022-04-09 RX ADMIN — MICONAZOLE NITRATE: 20 POWDER TOPICAL at 09:34

## 2022-04-09 RX ADMIN — GABAPENTIN 100 MG: 100 CAPSULE ORAL at 09:33

## 2022-04-09 RX ADMIN — CEFTRIAXONE SODIUM 1 G: 1 INJECTION, POWDER, FOR SOLUTION INTRAMUSCULAR; INTRAVENOUS at 12:29

## 2022-04-09 RX ADMIN — SEVELAMER CARBONATE 800 MG: 800 TABLET, FILM COATED ORAL at 09:35

## 2022-04-09 RX ADMIN — SERTRALINE HYDROCHLORIDE 100 MG: 50 TABLET ORAL at 09:34

## 2022-04-09 RX ADMIN — GABAPENTIN 100 MG: 100 CAPSULE ORAL at 14:22

## 2022-04-09 RX ADMIN — MICONAZOLE NITRATE: 20 POWDER TOPICAL at 21:40

## 2022-04-09 RX ADMIN — SODIUM BICARBONATE 1300 MG: 648 TABLET ORAL at 09:35

## 2022-04-09 RX ADMIN — MIDODRINE HYDROCHLORIDE 5 MG: 5 TABLET ORAL at 09:34

## 2022-04-09 RX ADMIN — GABAPENTIN 100 MG: 100 CAPSULE ORAL at 21:37

## 2022-04-09 RX ADMIN — SEVELAMER CARBONATE 800 MG: 800 TABLET, FILM COATED ORAL at 17:20

## 2022-04-09 ASSESSMENT — ACTIVITIES OF DAILY LIVING (ADL)
ADLS_ACUITY_SCORE: 16
ADLS_ACUITY_SCORE: 15
ADLS_ACUITY_SCORE: 16
ADLS_ACUITY_SCORE: 16
ADLS_ACUITY_SCORE: 15
ADLS_ACUITY_SCORE: 16
ADLS_ACUITY_SCORE: 15
ADLS_ACUITY_SCORE: 16
ADLS_ACUITY_SCORE: 15
ADLS_ACUITY_SCORE: 16
ADLS_ACUITY_SCORE: 15
ADLS_ACUITY_SCORE: 15
ADLS_ACUITY_SCORE: 16
ADLS_ACUITY_SCORE: 15
ADLS_ACUITY_SCORE: 16
ADLS_ACUITY_SCORE: 16

## 2022-04-09 NOTE — PLAN OF CARE
Problem: Plan of Care - These are the overarching goals to be used throughout the patient stay.    Goal: Plan of Care Review/Shift Note  Description: The Plan of Care Review/Shift note should be completed every shift.  The Outcome Evaluation is a brief statement about your assessment that the patient is improving, declining, or no change.  This information will be displayed automatically on your shift note.  Outcome: Ongoing, Progressing   Goal Outcome Evaluation:        Continue with strict I&O, jiang catheter, I.V.Rocephin.Nephrology will recommend a dermatology consult as O.P. to determine best treatment for possible calphylaxis.  Problem: Plan of Care - These are the overarching goals to be used throughout the patient stay.    Goal: Optimal Comfort and Wellbeing  Intervention: Monitor Pain and Promote Comfort  Recent Flowsheet Documentation  Taken 4/9/2022 0737 by Nessa Gallego RN  Pain Management Interventions: declines        Upper, inner thighs are a little less tender. Logrolled for adl's .  Problem: Fluid Volume Deficit  Goal: Fluid Balance  Outcome: Ongoing, Progressing   Tolerating adequate amounts of food and fluids.

## 2022-04-09 NOTE — PROGRESS NOTES
Daily Progress Note        CODE STATUS:  Full Code    04/07/22  Assessment/Plan:  46 year old female with PMH of hypertension, CHF, nephrolithiasis, PE on xarelto, NARCISA, multiple sclerosis, chronic lymphedema who was referred to our ED by her cardiology clinic for evaluation of abnormal labs.      Acute renal failure:  High AG metabolic acidosis:  -- Presented with creatinine of 6.6. It was normal in Feb 2022.  Likely pre-renal due to overdiuresis. Bumex has recently been increased per patient. Hypotension might have contributed to this as the patient reports low BP at home for the last couple of wks. Total CK normal  -- Improving. Creatinine 6.6-->5.7->4.4-->2.7. CT shows no hydronephrosis  -- Holding bumex and ACEI  -- Gentle hydration. IVF rate increased to 125ml/hr on 4/7. Brar for accurate I/O.  -- Nephrology consulted. Appreciate input     Hypotension:  -- Lactic acid is normal. Holding metoprolol  -- Given NS bolus 500ml x 2 on 4/7. Started on Midodrine. Patient reports no lightheadedness. No chest pain or shortness of breath  -- Cont with IVF.   -- I donot suspect PE as the patient had been on Xarelto, and has not been hypoxic  -- Checked cortisol level and TSH- normal. Blood cultures- NTD    Hyponatremia:  -- Sodium of 127 noted. Suspect due to intravascular vol depletion from overdiuresis  -- Improved with IVF.     UTI:  -- Abnormal UA noted  -- Agree with IV rocephin awaiting culture report/ U/C growing GNB and GPC. Final culture report is pending.     Diarrhea:  -- No diarrhea since admission  -- Likely viral GE.      H/O PE:  -- Holding xarelto due to low GFR. Use prophylactic heparin for now     HFpEF:  -- BNP is elevated in the context of renal failure. I dont belive she is in acute CHF  -- Holding diuretics for now     NARCISA:  -- CPAP at night     Lymphedema:  -- Consult lymphedema PT/OT      Disposition; Several days.Needs TCU.  Barrier to discharge; HUNG, hypotension      Subjective:  Interval  History: Patient feels better. Reports no new complaints. No acute issues overnight.     Review of Systems:   As mentioned in subjective.    Patient Active Problem List   Diagnosis     Multiple sclerosis (H)     Polycystic ovaries     Constipation     CARDIOVASCULAR SCREENING; LDL GOAL LESS THAN 160     Anxiety     Restless legs     Leg edema     Eating disorder     Papanicolaou smear of cervix with low grade squamous intraepithelial lesion (LGSIL)     Morbid obesity (H)     Peroneal tendon rupture     Benign essential hypertension     NARCISA (obstructive sleep apnea)     Moderate episode of recurrent major depressive disorder (H)     Cellulitis of abdominal wall     Sepsis (H)     History of abnormal cervical Pap smear     Generalized anxiety disorder     Lymphedema of both lower extremities     Tachycardia     SIRS (systemic inflammatory response syndrome) (H)     Cellulitis of right leg     Cellulitis     Pneumonia due to infectious organism, unspecified laterality, unspecified part of lung     Acute pulmonary embolism without acute cor pulmonale, unspecified pulmonary embolism type (H)     COVID-19     Chronic diastolic congestive heart failure (H)     Acute respiratory failure with hypoxia (H)     NSTEMI (non-ST elevated myocardial infarction) (H)     Dehydration     Hyperkalemia     Acute renal failure, unspecified acute renal failure type (H)     Hypotension due to hypovolemia     History of pulmonary embolism     Ureteral stone     Urinary tract infection     Hydronephrosis with urinary obstruction due to ureteral calculus     Chronic heart failure with preserved ejection fraction (H)     Splenomegaly     Hyponatremia     Renal insufficiency       Scheduled Meds:    cefTRIAXone  1 g Intravenous Q24H     gabapentin  100 mg Oral TID     heparin ANTICOAGULANT  5,000 Units Subcutaneous Q8H     [Held by provider] metoprolol succinate ER  25 mg Oral Daily     miconazole   Topical BID     midodrine  5 mg Oral TID  w/meals     sertraline  100 mg Oral Daily     sevelamer carbonate  800 mg Oral TID w/meals     sodium bicarbonate  1,300 mg Oral BID     sodium chloride (PF)  3 mL Intracatheter Q8H     Continuous Infusions:    PRN Meds:.acetaminophen, lidocaine 4%, lidocaine (buffered or not buffered), melatonin, sodium chloride (PF)    Objective:  Vital signs in last 24 hours:  Temp:  [98.1  F (36.7  C)-100  F (37.8  C)] 98.2  F (36.8  C)  Pulse:  [] 99  Resp:  [16-20] 16  BP: ()/(51-60) 107/57  SpO2:  [92 %-94 %] 92 %        Intake/Output Summary (Last 24 hours) at 4/7/2022 1251  Last data filed at 4/7/2022 0916  Gross per 24 hour   Intake 2399 ml   Output 2400 ml   Net -1 ml       Physical Exam:  HEENT: NC/AT                 Eyes-  Pupil round and reactive to light bilaterally                  Neck- supple, no JVP elevation,                  Sclera- anicteric                 Oropharynx- moist and pink  CHEST: Clear to auscultation bilateral anteriorly, no ronchi or wheezing  HEART: S1S2 normal, regular.   ABDMN: Soft. Non-tender, non-distended.  No guarding or rigidity. Bowel sounds- active  EXTRM: Chronic LE lymphedema. Chronic skin erythema on both sides, left >right. Better today.  NEURO: Alert and awake. Cranial nerves II-XII grossly intact. No focal neurological deficit.    Lab Results:(I have personally reviewed the results)    Recent Results (from the past 24 hour(s))   Glucose by meter    Collection Time: 04/08/22  1:32 PM   Result Value Ref Range    GLUCOSE BY METER POCT 107 (H) 70 - 99 mg/dL   Basic metabolic panel    Collection Time: 04/09/22  6:38 AM   Result Value Ref Range    Sodium 142 136 - 145 mmol/L    Potassium 3.9 3.5 - 5.0 mmol/L    Chloride 105 98 - 107 mmol/L    Carbon Dioxide (CO2) 26 22 - 31 mmol/L    Anion Gap 11 5 - 18 mmol/L    Urea Nitrogen 109 (H) 8 - 22 mg/dL    Creatinine 2.73 (H) 0.60 - 1.10 mg/dL    Calcium 9.6 8.5 - 10.5 mg/dL    Glucose 101 70 - 125 mg/dL    GFR Estimate 21 (L) >60  mL/min/1.73m2       All laboratory and imaging data in the past 24 hours reviewed  Serum Glucose range:   Recent Labs   Lab 04/09/22  0638 04/08/22  1332 04/08/22  0715 04/07/22  0754    107* 106 110     ABG: No lab results found in last 7 days.  CBC:   Recent Labs   Lab 04/07/22  0754 04/06/22  1107   WBC 11.0 9.6   HGB 11.5* 13.3   HCT 34.7* 40.3   MCV 81 82    236   NEUTROPHIL  --  82   LYMPH  --  8   MONOCYTE  --  8   EOSINOPHIL  --  2     Chemistry:   Recent Labs   Lab 04/09/22  0638 04/08/22  0715 04/07/22  0754 04/06/22  2048    136 128* 128*   POTASSIUM 3.9 4.1 4.7 4.1   CHLORIDE 105 100 93* 89*   CO2 26 19* 17* 18*   * 141* 151* 155*   CR 2.73* 4.42* 5.74* 5.98*   GFRESTIMATED 21* 12* 9* 8*   EVITA 9.6 8.6 8.7 9.3   MAG  --   --   --  2.0     Coags:  No results for input(s): INR, PROTIME, PTT in the last 168 hours.    Invalid input(s): APTT  Cardiac Markers:  Recent Labs   Lab 04/06/22 2048   TROPONINI 0.03          XR Chest 2 Views    Result Date: 3/23/2022  EXAM: XR CHEST 2 VW LOCATION: [facility] DATE/TIME: 3/23/2022 11:02 AM INDICATION: new cough x 3 4 days, recent pneumonia COMPARISON: 02/20/2022.     IMPRESSION: Shallow inspiration. Lungs are clear. Nothing for pneumonia on today's study. No new findings.    Abd/pelvis CT no contrast - Stone Protocol    Result Date: 4/6/2022  EXAM: CT ABDOMEN PELVIS W/O CONTRAST LOCATION: [facility] DATE/TIME: 4/6/2022 10:45 AM INDICATION: Diarrhea, dehydration, elevated creatinine and history of renal stones. COMPARISON: 02/21/2022. TECHNIQUE: CT scan of the abdomen and pelvis was performed without IV contrast. Multiplanar reformats were obtained. Dose reduction techniques were used. CONTRAST: None. FINDINGS: LOWER CHEST: Lung bases are clear. Dense calcification of the mitral annulus. HEPATOBILIARY: Mild diffuse decreased attenuation in liver consistent with hepatic steatosis. No focal  hepatic mass. Status post cholecystectomy. No biliary dilatation. PANCREAS: Normal. SPLEEN: Borderline splenomegaly with the spleen measuring 14.5 x 6.4 x 11.9 cm. No splenic lesions. ADRENAL GLANDS: Normal. KIDNEYS/BLADDER: Mild prominence of the right renal pelvis not significantly changed from the prior study. The stone that was seen in the right proximal ureter on the prior study is no longer present. There are no renal or ureteral stones on either side.  No left-sided hydronephrosis. No suspicious renal masses. No bladder stone or mass. BOWEL: No bowel obstruction or free intraperitoneal air. Scattered colonic diverticula without evidence for diverticulitis or abscess. Normal appendix. LYMPH NODES: There are normal-sized retroperitoneal lymph nodes no lymphadenopathy. VASCULATURE: Unremarkable. PELVIC ORGANS: Again noted is a 9.7 x 8.9 cm adnexal mass containing fat, soft tissue elements and calcification consistent with a mature teratoma. It appears unchanged from the prior study. This may arise from the left ovary but its large size puts it in the midline makes it difficult to say which ovary it arises from. MUSCULOSKELETAL: Mild degenerative change in the lumbar spine with grade 1 anterolisthesis of L4 on L5 likely secondary to facet arthropathy. No acute fracture or suspicious bony lesion.     IMPRESSION: 1.  The right proximal ureteral stone seen on the prior study is no longer present. There is mild prominence of the right upper collecting system but no stones or masses are seen in either kidney no left-sided hydronephrosis. 2.  Stable large mature teratoma (dermoid cyst) in the pelvis. 3.  No evidence for bowel obstruction, colitis, appendicitis or diverticulitis. 4.  Borderline splenomegaly.     XR KUB    Result Date: 3/23/2022  EXAM: XR KUB LOCATION: [facility] DATE/TIME: 3/23/2022 11:02 AM INDICATION:  Calculus of ureter COMPARISON: CT 02/21/2022     IMPRESSION: Renal  collecting systems are significantly obscured by large body habitus as well as stool and bowel. I do not see evidence of the right ureteral stone is seen on prior CT although this certainly could be obscured. Consider follow-up CT. Nothing for bowel obstruction. Calcifications in the pelvis consistent with the known dermoid seen on CT.      Latest radiology report personally reviewed.    Note created using dragon voice recognition software so sounds alike errors may have escaped editing.      04/09/2022   Bienvenido Morris MD  Hospitalist, [facility]  Pager: [phone]

## 2022-04-09 NOTE — PLAN OF CARE
Problem: Plan of Care - These are the overarching goals to be used throughout the patient stay.    Goal: Absence of Hospital-Acquired Illness or Injury  Intervention: Prevent and Manage VTE (Venous Thromboembolism) Risk  Recent Flowsheet Documentation  Taken 4/8/2022 1945 by Adalberto Gomes, RN  VTE Prevention/Management: (VTE prophylaxis) other (see comments)  Activity Management: activity adjusted per tolerance     Problem: Plan of Care - These are the overarching goals to be used throughout the patient stay.    Goal: Absence of Hospital-Acquired Illness or Injury  Intervention: Prevent Skin Injury  Recent Flowsheet Documentation  Taken 4/8/2022 1945 by Adalberto Gomes, RN  Body Position: position maintained   Goal Outcome Evaluation:

## 2022-04-09 NOTE — PROGRESS NOTES
RENAL PROGRESS NOTE    CC:  Acute renal failure.    ASSESSMENT & PLAN:   46 year old female with past medical history significant for morbid obesity, lesser congestive heart failure, chronic lymphedema of lower extremities, hypertension, nephrolithiasis, NARCISA on CPAP and multiple sclerosis who presented for further evaluation of abnormal labs. Nephrology service is consulted for acute kidney injury.     Non oliguric acute renal failure-likely secondary to prerenal etiology from persistent hypotension in settings of diuretics and ACE inhibitor's use. BUN/Cr ratio is >20.  CT abd/pelvis showed no evidence of obstructive uropathy.  Patient baseline serum creatinine 0.7-0.8 mg/dL.  Patient presents with creatinine of 6.65 mg/dL. Today Serum creatinine is trending down to 2.73 mg/dL .  Urine output in polyuric range (likely due to post ATN diuresis), patient put out 5000 ml yesterday and 1975cc since midnight without diuretics.  UA 04/06 suggestive UTI. Urine culture grew gram-negative bacilli and gram-positive cocci's, identification susceptibility still pending.  Total CK was wnl.  Patient is history of intermittent microscopic hematuria he is going back to June 2011.  Likely secondary to nephrolithiasis.  Urinalysis on February 8,022 showed 22 red blood cells per hpf, no proteinuria.  Urinalysis in March 2021 showed 120 g/g of protein and 22 red blood cells per hpf.  No indication for initiation of dialysis.  We will reevaluate daily.  Continue Midodrine 5 mg TID to keep MAP>65, hold if SBP<110  Continue to hold prior to admission Bumex   Monitor renal function and urine output daily  Avoid nephrotoxins and renally dose medications.        Hyponatremia-Moderate on admisson. Could be secondary to dehydration.   TSH and cortisol levels are wnl.  Serum sodium is trending up to 142 this am with . Trend serum sodium daily.     History of hypomagnesemia- Mg level was wnl at 2.0 on 04/06.      Metabolic acidosis-likely  secondary to HUNG.  Serum bicarbonate is trended up from 19 to 26 this morning. On sodium bicarbonate 1300 mg PO BID. Discontinue bicarb today. Trend.     Hypotension-patient reports persistent asymptomatic hypotension SBP in 70s at home for last 2 weeks at home. Improved with IV and scheduled Midodrine 5 mg PO TID to keep MAP 65  Continue to hold prior to admission lisinopril and Bumex        Volume status-hard to assess given patient body habitus.Net 8.5 L negative since admission. Weight at home was 334Lbs. No weight checked since admission. Patient with history of diastolic just of heart failure. 2D ECHO 22 showed LVEF of 65%, moderately  right ventricular systolic function.  Prior admission was taken 2 mg of Bumex twice a day.  Continue to hold prior to admission diuretics given acute kidney injury.  -monitor I/O  -Daily weight  -Low-sodium diet     UTI-urine culture grew GNR and GPC. On IV Ceftriaxone. Management as per primary service.     Skin lesions-patient is complaining of painful subcutaneous lesions on both shins. Several subcutaneous tender to palpation lesions on both anterior shins and inner thighs are appreciated on exam.  -NM 3 phase bone scan  showed numerous contiguous irregularly shaped areas of radiotracer activity within the soft tissues of both legs including medial aspects of both thighs as well as in both calves, left greater than right. Findings consistent with calciphylaxis.  -Patient should be referred to see dermatology service as outpatient for possible biopsy    Hyperphosphatemia serum phosphorus is elevated at 8.5.  Likely secondary to HUNG.    Started on  noncalcium based phosphorus binders sevelamer 800 mg with meals.  Renal diet.    Hyperparathyroidism-could be secondary to renal disease. PTH is 839.  -check vitamin D level     Normocytic anemia-patient presented with normal Hgb of 13.3 g/dl. Hgb was trending down to 11.5 g/dl . Likely dilutional from IVF.  No signs of active bleeding.     Chronic Lymphedema of lower extremities-lymphedema service consulted     History of hypertension-prior to admission patient was taken Metoprolol succinate 25 mg daily and Bumex 2 mg BID. Currently PTA antihypertensive medications on hold given hypotension.     Neuropathy-PTA on Gabapentin 200 mg TID. Gabapentin dose reduced to 100 mg TID given severe HUNG.        History of pulmonary embolism-on chronic anticoagulation with Xarelto     History of sleep apnea- on CPAP at night.     We will follow       SUBJECTIVE:    No acute issues overnight.  Patient said that she is feeling better, reports improved generalized weakness.  Patient denies: fever, chills, dizziness, cough, shortness of breath , chest pain, palpitations, orthopnea, nausea, vomiting, abdominal pain, changes in bowel habits, dysuria, urinary frequency, urgency, hematuria, rash.  Patient accompanied by friend at the bedside.  Updated on labs and results of NM bone scan. All questions answered.      OBJECTIVE:  Physical Exam   Temp: 98  F (36.7  C) Temp src: Oral BP: 118/66 Pulse: 99   Resp: 16 SpO2: 94 % O2 Device: None (Room air)    Vitals:    04/06/22 0927   Weight: 101.6 kg (224 lb)     Vital Signs with Ranges  Temp:  [98  F (36.7  C)-100  F (37.8  C)] 98  F (36.7  C)  Pulse:  [] 99  Resp:  [16-18] 16  BP: ()/(51-66) 118/66  SpO2:  [92 %-94 %] 94 %  I/O last 3 completed shifts:  In: 240 [P.O.:240]  Out: 5375 [Urine:5375]    @TMAXR(24)@    No data found.[unfilled]    PHYSICAL EXAM:  Alert/oriented x 3; awake and NAD, morbidly obese  HEENT NC/AT; perrla; OP clear without lesions; mmm  Neck supple without LAD, TM  CV; RRR without rub or murmur  Lung: clear and equal; no extra sounds  Ab: obese, soft and NT; not distended; normal bs  Ext: stable woody edema of both lower extremities  Skin; no rash, erythema on anterior shins consistent with stasis dermatitis, there are several subcutaneous tender to plapation  lesions on both anterior shins.  Neuro; grossly intact, no asterixis    LABORATORY STUDIES:     Recent Labs   Lab 04/07/22  0754 04/06/22  1107   WBC 11.0 9.6   RBC 4.27 4.92   HGB 11.5* 13.3   HCT 34.7* 40.3    236       Basic Metabolic Panel:  Recent Labs   Lab 04/09/22  0638 04/08/22  1332 04/08/22  0715 04/07/22  0754 04/06/22  2048 04/06/22  1107 04/05/22  1609     --  136 128* 128* 127* 126*   POTASSIUM 3.9  --  4.1 4.7 4.1 4.9 4.7   CHLORIDE 105  --  100 93* 89* 85* 85*   CO2 26  --  19* 17* 18* 22 21   *  --  141* 151* 155* 158* 164*   CR 2.73*  --  4.42* 5.74* 5.98* 6.65* 6.20*    107* 106 110 130* 90 96   EVITA 9.6  --  8.6 8.7 9.3 9.9 10.1       INRNo lab results found in last 7 days.     Recent Labs   Lab Test 04/07/22  0754 04/06/22  1107 02/14/22  0510 02/11/22  0502   INR  --   --   --  1.23*   WBC 11.0 9.6   < > 5.9   HGB 11.5* 13.3   < > 12.6    236   < > 219    < > = values in this interval not displayed.       Personally reviewed current labs     ~ 35 minutes spent in exam, POC, education regarding renal disease and management.  >90% time spent in education and counseling and/or discussion with patient's care team    Jodee Clark MD  [facility]  [address] [place] [address], [address]  [place] [address]  Phone# [phone]  Fax# [phone]

## 2022-04-10 LAB
ALBUMIN SERPL-MCNC: 2.8 G/DL (ref 3.5–5)
ANION GAP SERPL CALCULATED.3IONS-SCNC: 14 MMOL/L (ref 5–18)
BUN SERPL-MCNC: 77 MG/DL (ref 8–22)
CALCIUM SERPL-MCNC: 10.1 MG/DL (ref 8.5–10.5)
CALCIUM, IONIZED MEASURED: 1.28 MMOL/L (ref 1.11–1.3)
CHLORIDE BLD-SCNC: 106 MMOL/L (ref 98–107)
CO2 SERPL-SCNC: 22 MMOL/L (ref 22–31)
CREAT SERPL-MCNC: 2.05 MG/DL (ref 0.6–1.1)
DEPRECATED CALCIDIOL+CALCIFEROL SERPL-MC: 40 UG/L (ref 20–75)
GFR SERPL CREATININE-BSD FRML MDRD: 29 ML/MIN/1.73M2
GLUCOSE BLD-MCNC: 106 MG/DL (ref 70–125)
ION CA PH 7.4: 1.3 MMOL/L (ref 1.11–1.3)
PH: 7.42 (ref 7.35–7.45)
POTASSIUM BLD-SCNC: 3.7 MMOL/L (ref 3.5–5)
SODIUM SERPL-SCNC: 142 MMOL/L (ref 136–145)

## 2022-04-10 PROCEDURE — 120N000001 HC R&B MED SURG/OB

## 2022-04-10 PROCEDURE — 99233 SBSQ HOSP IP/OBS HIGH 50: CPT | Performed by: INTERNAL MEDICINE

## 2022-04-10 PROCEDURE — 250N000013 HC RX MED GY IP 250 OP 250 PS 637: Performed by: INTERNAL MEDICINE

## 2022-04-10 PROCEDURE — 80048 BASIC METABOLIC PNL TOTAL CA: CPT | Performed by: INTERNAL MEDICINE

## 2022-04-10 PROCEDURE — 250N000011 HC RX IP 250 OP 636: Performed by: INTERNAL MEDICINE

## 2022-04-10 PROCEDURE — 99232 SBSQ HOSP IP/OBS MODERATE 35: CPT | Performed by: INTERNAL MEDICINE

## 2022-04-10 PROCEDURE — 36415 COLL VENOUS BLD VENIPUNCTURE: CPT | Performed by: INTERNAL MEDICINE

## 2022-04-10 PROCEDURE — 82330 ASSAY OF CALCIUM: CPT | Performed by: INTERNAL MEDICINE

## 2022-04-10 PROCEDURE — 82040 ASSAY OF SERUM ALBUMIN: CPT | Performed by: INTERNAL MEDICINE

## 2022-04-10 RX ORDER — MIDODRINE HYDROCHLORIDE 2.5 MG/1
2.5 TABLET ORAL 3 TIMES DAILY PRN
Status: DISCONTINUED | OUTPATIENT
Start: 2022-04-10 | End: 2022-04-12 | Stop reason: HOSPADM

## 2022-04-10 RX ORDER — CINACALCET 30 MG/1
30 TABLET, FILM COATED ORAL
Status: DISCONTINUED | OUTPATIENT
Start: 2022-04-11 | End: 2022-04-12 | Stop reason: HOSPADM

## 2022-04-10 RX ADMIN — SEVELAMER CARBONATE 800 MG: 800 TABLET, FILM COATED ORAL at 18:07

## 2022-04-10 RX ADMIN — CEFTRIAXONE SODIUM 1 G: 1 INJECTION, POWDER, FOR SOLUTION INTRAMUSCULAR; INTRAVENOUS at 12:13

## 2022-04-10 RX ADMIN — GABAPENTIN 100 MG: 100 CAPSULE ORAL at 14:25

## 2022-04-10 RX ADMIN — HEPARIN SODIUM 5000 UNITS: 10000 INJECTION, SOLUTION INTRAVENOUS; SUBCUTANEOUS at 21:51

## 2022-04-10 RX ADMIN — SEVELAMER CARBONATE 800 MG: 800 TABLET, FILM COATED ORAL at 14:25

## 2022-04-10 RX ADMIN — HEPARIN SODIUM 5000 UNITS: 10000 INJECTION, SOLUTION INTRAVENOUS; SUBCUTANEOUS at 05:34

## 2022-04-10 RX ADMIN — SEVELAMER CARBONATE 800 MG: 800 TABLET, FILM COATED ORAL at 10:09

## 2022-04-10 RX ADMIN — HEPARIN SODIUM 5000 UNITS: 10000 INJECTION, SOLUTION INTRAVENOUS; SUBCUTANEOUS at 14:25

## 2022-04-10 RX ADMIN — MICONAZOLE NITRATE: 20 POWDER TOPICAL at 10:08

## 2022-04-10 RX ADMIN — GABAPENTIN 100 MG: 100 CAPSULE ORAL at 10:08

## 2022-04-10 RX ADMIN — MICONAZOLE NITRATE: 20 POWDER TOPICAL at 21:52

## 2022-04-10 RX ADMIN — SERTRALINE HYDROCHLORIDE 100 MG: 50 TABLET ORAL at 10:09

## 2022-04-10 RX ADMIN — GABAPENTIN 100 MG: 100 CAPSULE ORAL at 21:51

## 2022-04-10 ASSESSMENT — ACTIVITIES OF DAILY LIVING (ADL)
ADLS_ACUITY_SCORE: 15
ADLS_ACUITY_SCORE: 19
ADLS_ACUITY_SCORE: 15
ADLS_ACUITY_SCORE: 19
ADLS_ACUITY_SCORE: 19
ADLS_ACUITY_SCORE: 15
ADLS_ACUITY_SCORE: 19
ADLS_ACUITY_SCORE: 19
ADLS_ACUITY_SCORE: 15
ADLS_ACUITY_SCORE: 15
ADLS_ACUITY_SCORE: 19
ADLS_ACUITY_SCORE: 15
ADLS_ACUITY_SCORE: 15
ADLS_ACUITY_SCORE: 19
ADLS_ACUITY_SCORE: 15
ADLS_ACUITY_SCORE: 19

## 2022-04-10 NOTE — PLAN OF CARE
Problem: Plan of Care - These are the overarching goals to be used throughout the patient stay.    Goal: Absence of Hospital-Acquired Illness or Injury  Intervention: Prevent Skin Injury  Recent Flowsheet Documentation  Taken 4/10/2022 0100 by Kristine Jorge RN  Body Position: position maintained  Taken 4/9/2022 2156 by Kristine Jorge RN  Body Position: position maintained     Problem: Muscle Strength Impairment  Goal: Improved Muscle Strength  Outcome: Ongoing, Progressing   Goal Outcome Evaluation:      Patient repeatedly refused repositioning, remained supine. Skin fold areas treated with Vaibhav powder. Neuropathic pain treated with gabapentin. Strict I&O recorded, jiang draining yellow and clear urine. Rested comfortably.

## 2022-04-10 NOTE — PLAN OF CARE
Problem: Plan of Care - These are the overarching goals to be used throughout the patient stay.    Goal: Plan of Care Review/Shift Note  Description: The Plan of Care Review/Shift note should be completed every shift.  The Outcome Evaluation is a brief statement about your assessment that the patient is improving, declining, or no change.  This information will be displayed automatically on your shift note.  Outcome: Ongoing, Progressing   Goal Outcome Evaluation:        Continue to monitor renal function. Brar catheter in place for strict I&O. Wound care per w.o.c. nurse recommendations. Oob today.  Problem: Muscle Strength Impairment  Goal: Improved Muscle Strength  Outcome: Ongoing, Progressing        Bess has M.S and therefore does have extremity weakness. Left>right.

## 2022-04-10 NOTE — PROGRESS NOTES
Daily Progress Note        CODE STATUS:  Full Code    04/07/22  Assessment/Plan:  46 year old female with PMH of hypertension, CHF, nephrolithiasis, PE on xarelto, NARCISA, multiple sclerosis, chronic lymphedema who was referred to our ED by her cardiology clinic for evaluation of abnormal labs.      Acute renal failure:  High AG metabolic acidosis:  -- Presented with creatinine of 6.6. It was normal in Feb 2022.  Likely pre-renal due to overdiuresis. Bumex has recently been increased per patient. Hypotension might have contributed to this as the patient reports low BP at home for the last couple of wks. Total CK normal  -- Improving. Creatinine 6.6-->5.7->4.4-->2.7--> 2.0. CT shows no hydronephrosis  -- Holding bumex and ACEI  -- Gentle hydration. IVF rate increased to 125ml/hr on 4/7. Brar for accurate I/O.  -- Nephrology consulted. Appreciate input     Hypotension:  -- Lactic acid is normal. Holding metoprolol  -- Given NS bolus 500ml x 2 on 4/7. Started on Midodrine. Patient reports no lightheadedness. No chest pain or shortness of breath  -- Cont with IVF.   -- I donot suspect PE as the patient had been on Xarelto, and has not been hypoxic  -- Checked cortisol level and TSH- normal. Blood cultures- NTD    Hyponatremia:  -- Sodium of 127 noted. Suspect due to intravascular vol depletion from overdiuresis  -- Improved with IVF.     UTI:  -- Abnormal UA noted  -- Cont IV rocephin awaiting culture report/ U/C growing GNB and GPC. Final culture report is pending.     Diarrhea:  -- No diarrhea since admission  -- Likely viral GE.      H/O PE:  -- Holding xarelto due to low GFR. Use prophylactic heparin for now. Will resume xarelto under GFR improves.     HFpEF:  -- BNP is elevated in the context of renal failure. I dont belive she is in acute CHF  -- Holding diuretics for now     NARCISA:  -- CPAP at night     Lymphedema:  Skin lesions:  -- Consult lymphedema PT/OT  -- NM3 phase bone scan on 4/8 showed numerous contiguous  irregularly shaped areas of radiotracer activity within th soft tissues of both legs including medial aspects of both thighs as well as both calves, left greater than right. Findings consistent with calciphylaxis. Patient should be referred to see dermatology service as outpatient for possible biopsy per nephrology.      Disposition; TCU in next 1-2 days  Barrier to discharge; HUNG.      Subjective:  Interval History: Patient seen and examined this morning. She reports feeling better. Wanting to work with therapy, and see if she can go home directly from here, not to TCU. But, she is open to TCU if recommends TCU.     Review of Systems:   As mentioned in subjective.    Patient Active Problem List   Diagnosis     Multiple sclerosis (H)     Polycystic ovaries     Constipation     CARDIOVASCULAR SCREENING; LDL GOAL LESS THAN 160     Anxiety     Restless legs     Leg edema     Eating disorder     Papanicolaou smear of cervix with low grade squamous intraepithelial lesion (LGSIL)     Morbid obesity (H)     Peroneal tendon rupture     Benign essential hypertension     NARCISA (obstructive sleep apnea)     Moderate episode of recurrent major depressive disorder (H)     Cellulitis of abdominal wall     Sepsis (H)     History of abnormal cervical Pap smear     Generalized anxiety disorder     Lymphedema of both lower extremities     Tachycardia     SIRS (systemic inflammatory response syndrome) (H)     Cellulitis of right leg     Cellulitis     Pneumonia due to infectious organism, unspecified laterality, unspecified part of lung     Acute pulmonary embolism without acute cor pulmonale, unspecified pulmonary embolism type (H)     COVID-19     Chronic diastolic congestive heart failure (H)     Acute respiratory failure with hypoxia (H)     NSTEMI (non-ST elevated myocardial infarction) (H)     Dehydration     Hyperkalemia     Acute renal failure, unspecified acute renal failure type (H)     Hypotension due to hypovolemia      History of pulmonary embolism     Ureteral stone     Urinary tract infection     Hydronephrosis with urinary obstruction due to ureteral calculus     Chronic heart failure with preserved ejection fraction (H)     Splenomegaly     Hyponatremia     Renal insufficiency       Scheduled Meds:    cefTRIAXone  1 g Intravenous Q24H     gabapentin  100 mg Oral TID     heparin ANTICOAGULANT  5,000 Units Subcutaneous Q8H     [Held by provider] metoprolol succinate ER  25 mg Oral Daily     miconazole   Topical BID     midodrine  5 mg Oral TID w/meals     sertraline  100 mg Oral Daily     sevelamer carbonate  800 mg Oral TID w/meals     sodium chloride (PF)  3 mL Intracatheter Q8H     Continuous Infusions:    PRN Meds:.acetaminophen, lidocaine 4%, lidocaine (buffered or not buffered), melatonin, sodium chloride (PF)    Objective:  Vital signs in last 24 hours:  Temp:  [98  F (36.7  C)-98.7  F (37.1  C)] 98.7  F (37.1  C)  Pulse:  [] 102  Resp:  [16] 16  BP: (107-125)/(57-68) 113/68  SpO2:  [92 %-94 %] 94 %        Intake/Output Summary (Last 24 hours) at 4/7/2022 1251  Last data filed at 4/7/2022 0916  Gross per 24 hour   Intake 2399 ml   Output 2400 ml   Net -1 ml       Physical Exam:  HEENT: NC/AT                 Eyes-  Pupil round and reactive to light bilaterally                  Neck- supple, no JVP elevation,                  Sclera- anicteric                 Oropharynx- moist and pink  CHEST: Clear to auscultation bilateral anteriorly, no ronchi or wheezing  HEART: S1S2 normal, regular.   ABDMN: Soft. Non-tender, non-distended.  No guarding or rigidity. Bowel sounds- active  EXTRM: Chronic LE lymphedema. Chronic skin erythema on both sides, left >right. Better today.  NEURO: Alert and awake. Cranial nerves II-XII grossly intact. No focal neurological deficit.    Lab Results:(I have personally reviewed the results)    No results found for this or any previous visit (from the past 24 hour(s)).    All laboratory and  imaging data in the past 24 hours reviewed  Serum Glucose range:   Recent Labs   Lab 04/09/22  0638 04/08/22  1332 04/08/22  0715 04/07/22  0754    107* 106 110     ABG: No lab results found in last 7 days.  CBC:   Recent Labs   Lab 04/07/22  0754 04/06/22  1107   WBC 11.0 9.6   HGB 11.5* 13.3   HCT 34.7* 40.3   MCV 81 82    236   NEUTROPHIL  --  82   LYMPH  --  8   MONOCYTE  --  8   EOSINOPHIL  --  2     Chemistry:   Recent Labs   Lab 04/09/22  0638 04/08/22  0715 04/07/22  0754 04/06/22  2048    136 128* 128*   POTASSIUM 3.9 4.1 4.7 4.1   CHLORIDE 105 100 93* 89*   CO2 26 19* 17* 18*   * 141* 151* 155*   CR 2.73* 4.42* 5.74* 5.98*   GFRESTIMATED 21* 12* 9* 8*   EVITA 9.6 8.6 8.7 9.3   MAG  --   --   --  2.0     Coags:  No results for input(s): INR, PROTIME, PTT in the last 168 hours.    Invalid input(s): APTT  Cardiac Markers:  Recent Labs   Lab 04/06/22 2048   TROPONINI 0.03          XR Chest 2 Views    Result Date: 3/23/2022  EXAM: XR CHEST 2 VW LOCATION: [facility] DATE/TIME: 3/23/2022 11:02 AM INDICATION: new cough x 3 4 days, recent pneumonia COMPARISON: 02/20/2022.     IMPRESSION: Shallow inspiration. Lungs are clear. Nothing for pneumonia on today's study. No new findings.    Abd/pelvis CT no contrast - Stone Protocol    Result Date: 4/6/2022  EXAM: CT ABDOMEN PELVIS W/O CONTRAST LOCATION: [facility] DATE/TIME: 4/6/2022 10:45 AM INDICATION: Diarrhea, dehydration, elevated creatinine and history of renal stones. COMPARISON: 02/21/2022. TECHNIQUE: CT scan of the abdomen and pelvis was performed without IV contrast. Multiplanar reformats were obtained. Dose reduction techniques were used. CONTRAST: None. FINDINGS: LOWER CHEST: Lung bases are clear. Dense calcification of the mitral annulus. HEPATOBILIARY: Mild diffuse decreased attenuation in liver consistent with hepatic steatosis. No focal hepatic mass. Status post cholecystectomy.  No biliary dilatation. PANCREAS: Normal. SPLEEN: Borderline splenomegaly with the spleen measuring 14.5 x 6.4 x 11.9 cm. No splenic lesions. ADRENAL GLANDS: Normal. KIDNEYS/BLADDER: Mild prominence of the right renal pelvis not significantly changed from the prior study. The stone that was seen in the right proximal ureter on the prior study is no longer present. There are no renal or ureteral stones on either side.  No left-sided hydronephrosis. No suspicious renal masses. No bladder stone or mass. BOWEL: No bowel obstruction or free intraperitoneal air. Scattered colonic diverticula without evidence for diverticulitis or abscess. Normal appendix. LYMPH NODES: There are normal-sized retroperitoneal lymph nodes no lymphadenopathy. VASCULATURE: Unremarkable. PELVIC ORGANS: Again noted is a 9.7 x 8.9 cm adnexal mass containing fat, soft tissue elements and calcification consistent with a mature teratoma. It appears unchanged from the prior study. This may arise from the left ovary but its large size puts it in the midline makes it difficult to say which ovary it arises from. MUSCULOSKELETAL: Mild degenerative change in the lumbar spine with grade 1 anterolisthesis of L4 on L5 likely secondary to facet arthropathy. No acute fracture or suspicious bony lesion.     IMPRESSION: 1.  The right proximal ureteral stone seen on the prior study is no longer present. There is mild prominence of the right upper collecting system but no stones or masses are seen in either kidney no left-sided hydronephrosis. 2.  Stable large mature teratoma (dermoid cyst) in the pelvis. 3.  No evidence for bowel obstruction, colitis, appendicitis or diverticulitis. 4.  Borderline splenomegaly.     XR KUB    Result Date: 3/23/2022  EXAM: XR KUB LOCATION: [facility] DATE/TIME: 3/23/2022 11:02 AM INDICATION:  Calculus of ureter COMPARISON: CT 02/21/2022     IMPRESSION: Renal collecting systems are significantly obscured by  large body habitus as well as stool and bowel. I do not see evidence of the right ureteral stone is seen on prior CT although this certainly could be obscured. Consider follow-up CT. Nothing for bowel obstruction. Calcifications in the pelvis consistent with the known dermoid seen on CT.      Latest radiology report personally reviewed.    Note created using dragon voice recognition software so sounds alike errors may have escaped editing.      04/10/2022   Bienvenido Morris MD  Hospitalist, [facility]  Pager: [phone]

## 2022-04-10 NOTE — PROGRESS NOTES
RENAL PROGRESS NOTE    CC:  Acute renal failure.    ASSESSMENT & PLAN:   46 year old female with past medical history significant for morbid obesity,  congestive heart failure, chronic lymphedema of lower extremities, hypertension, nephrolithiasis, NARCISA on CPAP and multiple sclerosis who presented for further evaluation of abnormal labs. Nephrology service is consulted for acute kidney injury.     Non oliguric acute renal failure-likely secondary to prerenal etiology from persistent hypotension in settings of diuretics and ACE inhibitor's use. BUN/Cr ratio is >20.  CT abd/pelvis showed no evidence of obstructive uropathy.  Patient baseline serum creatinine 0.7-0.8 mg/dL.  Patient presents with creatinine of 6.65 mg/dL. Today Serum creatinine is trending down to 2.05 mg/dL .  Urine output in polyuric range without diuretics (likely due to post ATN diuresis), patient put out 4775 ml yesterday and 2250cc since midnight without diuretics.  UA 04/06 suggestive UTI. Urine culture grew gram-negative bacilli and gram-positive cocci's, identification susceptibility still pending.  Total CK was wnl.  Patient is history of intermittent microscopic hematuria going back to June 2011.  Likely secondary to nephrolithiasis.  Urinalysis on February 8,022 showed 22 red blood cells per hpf, no proteinuria.  Urinalysis in March 2021 showed 120 g/g of protein and 22 red blood cells per hpf.  No indication for initiation of dialysis.  We will reevaluate daily.  Continue Midodrine 5 mg TID to keep MAP>65, hold if SBP<110  Continue to hold prior to admission Bumex   Monitor renal function and urine output daily  Avoid nephrotoxins and renally dose medications.        Hyponatremia-Moderate on admisson. Could be secondary to dehydration.   TSH and cortisol levels are wnl.  Resolved. Serum sodium is 142 this am Trend serum sodium daily.     History of hypomagnesemia- Mg level was wnl at 2.0 on 04/06.      Metabolic acidosis-likely  secondary to HUNG.  Resolved. Serum bicarbonate is 22 this morning. Off sodium bicarbonate supplements. Trend.     Hypotension-patient reported persistent asymptomatic hypotension SBP in 70s at home for last 2 weeks at home. Improved with IV and scheduled Midodrine 5 mg PO TID to keep MAP 65. Patient received one Midodrine dose yesterday morning.  Continue to hold prior to admission lisinopril and Bumex  Change Midodrine from scheduled to prn for SBP<110        Volume status-hard to assess given patient body habitus.  Patient appears euvolemic on exam. Net 10 L negative since admission. Weight at home was 334Lbs. Today patient's weight is 317 lbs.  Patient with history of diastolic just of heart failure. 2D ECHO 22 showed LVEF of 65%, moderately  right ventricular systolic function.  Prior admission was taken 2 mg of Bumex twice a day.  Continue to hold prior to admission diuretics given acute kidney injury.  -monitor I/O  -Daily weight  -Low-sodium diet     UTI-urine culture grew GNR and GPC. On IV Ceftriaxone. Management as per primary service.     Skin lesions-patient is complaining of painful subcutaneous lesions on both shins. Several subcutaneous hard and tender to palpation lesions on both anterior shins and inner thighs are appreciated on exam. ? Calcinosis cutis.  NM 3 phase bone scan  showed numerous contiguous irregularly shaped areas of radiotracer activity within the soft tissues of both legs including medial aspects of both thighs as well as in both calves, left greater than right. findings consistent with calciphylaxis.  -Patient should be referred to see dermatology as outpatient for possible biopsy of skin lesions.    Hyperphosphatemia serum phosphorus is elevated at 8.5.  Likely secondary to HUNG.    Started on  noncalcium based phosphorus binders sevelamer 800 mg with meals.  Renal diet.  Repeat phosphorus in am.    Hyperparathyroidism-could be primary (history of nephrolithiasis)  vs secondary to renal disease. PTH is 839. Needs further investigation.  No history of hypercalcemia  Serum calcium is in upper limits of normal today. Check serum albumin and ionized calcium  Vitamin D level is within normal limits.  Patient will need to have 24 hours urine calcium ex test as an out patient  Start on Sensipar 30 mg three times per week given possible calciphylaxis.       Normocytic anemia-patient presented with normal Hgb of 13.3 g/dl. Hgb was trending down to 11.5 g/dl 04/07. Likely dilutional from IVF. No signs of active bleeding.     Chronic Lymphedema of lower extremities-lymphedema service consulted     History of hypertension-prior to admission patient was taken Metoprolol succinate 25 mg daily and Bumex 2 mg BID. Currently PTA antihypertensive medications on hold given hypotension.     Neuropathy-PTA on Gabapentin 200 mg TID. Gabapentin dose reduced to 100 mg TID given severe HUNG.        History of pulmonary embolism-on chronic anticoagulation with Xarelto     History of sleep apnea- on CPAP at night.     We will follow       SUBJECTIVE:    No acute issues overnight.  Patient said that she is feeling better, reports improved generalized weakness.  Patient denies: fever, chills, dizziness, cough, shortness of breath , chest pain, palpitations, orthopnea, nausea, vomiting, abdominal pain, changes in bowel habits, dysuria, urinary frequency, urgency, hematuria, rash.  Patient accompanied by friend at the bedside.  Updated on labs . All questions answered.      OBJECTIVE:  Physical Exam   Temp: 98.7  F (37.1  C) Temp src: Oral BP: 113/68 Pulse: 102   Resp: 16 SpO2: 94 % O2 Device: BiPAP/CPAP    Vitals:    04/06/22 0927 04/09/22 1400   Weight: 101.6 kg (224 lb) 146.6 kg (323 lb 3.1 oz)     Vital Signs with Ranges  Temp:  [98  F (36.7  C)-98.7  F (37.1  C)] 98.7  F (37.1  C)  Pulse:  [] 102  Resp:  [16] 16  BP: (113-125)/(65-68) 113/68  SpO2:  [92 %-94 %] 94 %  I/O last 3 completed  shifts:  In: 1407 [P.O.:1404; I.V.:3]  Out: 4350 [Urine:4350]    @TMAXR(24)@    Patient Vitals for the past 72 hrs:   Weight   04/09/22 1400 146.6 kg (323 lb 3.1 oz)   [unfilled]    PHYSICAL EXAM:  Alert/oriented x 3; awake and NAD, morbidly obese  HEENT NC/AT; perrla; OP clear without lesions; mmm  Neck supple without LAD, TM  CV; RRR without rub or murmur  Lung: clear and equal; no extra sounds  Ab: obese, soft and NT; not distended; normal bs  Ext: improved woody edema of both lower extremities R>L  Skin; no rash, stable erythema on anterior shins  And inner thighs consistent with stasis dermatitis,  several subcutaneous tender to plapation lesions on both anterior shins and inner thighs.  Neuro; grossly intact, no asterixis    LABORATORY STUDIES:     Recent Labs   Lab 04/07/22  0754 04/06/22  1107   WBC 11.0 9.6   RBC 4.27 4.92   HGB 11.5* 13.3   HCT 34.7* 40.3    236       Basic Metabolic Panel:  Recent Labs   Lab 04/10/22  0647 04/09/22  0638 04/08/22  1332 04/08/22  0715 04/07/22  0754 04/06/22  2048 04/06/22  1107    142  --  136 128* 128* 127*   POTASSIUM 3.7 3.9  --  4.1 4.7 4.1 4.9   CHLORIDE 106 105  --  100 93* 89* 85*   CO2 22 26  --  19* 17* 18* 22   BUN 77* 109*  --  141* 151* 155* 158*   CR 2.05* 2.73*  --  4.42* 5.74* 5.98* 6.65*    101 107* 106 110 130* 90   EVITA 10.1 9.6  --  8.6 8.7 9.3 9.9       INRNo lab results found in last 7 days.     Recent Labs   Lab Test 04/07/22  0754 04/06/22  1107 02/14/22  0510 02/11/22  0502   INR  --   --   --  1.23*   WBC 11.0 9.6   < > 5.9   HGB 11.5* 13.3   < > 12.6    236   < > 219    < > = values in this interval not displayed.       Personally reviewed current labs     ~ 35 minutes spent in exam, POC, education regarding renal disease and management.  >90% time spent in education and counseling and/or discussion with patient's care team    Jodee Clark MD  [facility]  [address] [place] [address]  [address]  [place] [address]  Phone# [phone]  Fax# [phone]

## 2022-04-10 NOTE — PROGRESS NOTES
Care Management Follow Up    Length of Stay (days): 4    Expected Discharge Date: 04/11/2022     Concerns to be Addressed:  Renal status      Patient plan of care discussed at interdisciplinary rounds: Yes    Anticipated Discharge Disposition:  TCU vs home with home care     Anticipated Discharge Services:    Anticipated Discharge DME:  Per therapy      Education Provided on the Discharge Plan:  yes  Patient/Family in Agreement with the Plan:  yes    Referrals Placed by CM/SW:  TCU      Additional Information:  Patient admitted for UTI, HUNG. Nephrology following.    Social History:  Per initial CM consult: Pt lives alone in her own home. Her sister Rocío: [phone] is her PCA at this time until patient establishes herself with another one. Patient also receives home care from [facility]: RN, OT, PT, HHA. She has a walker and wheel chair. She had been hospitalized twice in February.     Met with patient to discuss TCU recommendations. She states preference is for home with home care. She does live alone. Discussed option for having more support at home with possibly up to 24 hour support until stronger to be on own.  She and her sister will discuss options.  Patient states agreement to referral to TCU closest to her. She understands TCU may be limited due to bariatric bed need.  Patient is 317lbs.  Referrals faxed to TCU that according to list accept patient's over 300lbs. Patient would like to see how she does with therapy to determine if home discharge is possible.  Anticipating need for [facility] to transport.       Wen Burrell RN

## 2022-04-11 ENCOUNTER — APPOINTMENT (OUTPATIENT)
Dept: PHYSICAL THERAPY | Facility: HOSPITAL | Age: 47
DRG: 683 | End: 2022-04-11
Payer: COMMERCIAL

## 2022-04-11 ENCOUNTER — APPOINTMENT (OUTPATIENT)
Dept: OCCUPATIONAL THERAPY | Facility: HOSPITAL | Age: 47
DRG: 683 | End: 2022-04-11
Payer: COMMERCIAL

## 2022-04-11 LAB
ALBUMIN SERPL-MCNC: 2.8 G/DL (ref 3.5–5)
ANION GAP SERPL CALCULATED.3IONS-SCNC: 11 MMOL/L (ref 5–18)
BUN SERPL-MCNC: 50 MG/DL (ref 8–22)
CALCIUM SERPL-MCNC: 10.3 MG/DL (ref 8.5–10.5)
CHLORIDE BLD-SCNC: 108 MMOL/L (ref 98–107)
CO2 SERPL-SCNC: 23 MMOL/L (ref 22–31)
CREAT SERPL-MCNC: 1.65 MG/DL (ref 0.6–1.1)
GFR SERPL CREATININE-BSD FRML MDRD: 38 ML/MIN/1.73M2
GLUCOSE BLD-MCNC: 100 MG/DL (ref 70–125)
PHOSPHATE SERPL-MCNC: 3.2 MG/DL (ref 2.5–4.5)
PLATELET # BLD AUTO: 243 10E3/UL (ref 150–450)
POTASSIUM BLD-SCNC: 3.8 MMOL/L (ref 3.5–5)
SODIUM SERPL-SCNC: 142 MMOL/L (ref 136–145)

## 2022-04-11 PROCEDURE — 97116 GAIT TRAINING THERAPY: CPT | Mod: GP

## 2022-04-11 PROCEDURE — 97110 THERAPEUTIC EXERCISES: CPT | Mod: GP

## 2022-04-11 PROCEDURE — 99233 SBSQ HOSP IP/OBS HIGH 50: CPT | Performed by: INTERNAL MEDICINE

## 2022-04-11 PROCEDURE — 250N000013 HC RX MED GY IP 250 OP 250 PS 637: Performed by: INTERNAL MEDICINE

## 2022-04-11 PROCEDURE — 36415 COLL VENOUS BLD VENIPUNCTURE: CPT | Performed by: INTERNAL MEDICINE

## 2022-04-11 PROCEDURE — 80069 RENAL FUNCTION PANEL: CPT | Performed by: INTERNAL MEDICINE

## 2022-04-11 PROCEDURE — 97535 SELF CARE MNGMENT TRAINING: CPT | Mod: GO

## 2022-04-11 PROCEDURE — 120N000001 HC R&B MED SURG/OB

## 2022-04-11 PROCEDURE — 85049 AUTOMATED PLATELET COUNT: CPT | Performed by: INTERNAL MEDICINE

## 2022-04-11 PROCEDURE — 250N000011 HC RX IP 250 OP 636: Performed by: INTERNAL MEDICINE

## 2022-04-11 PROCEDURE — 99232 SBSQ HOSP IP/OBS MODERATE 35: CPT | Performed by: INTERNAL MEDICINE

## 2022-04-11 PROCEDURE — 97530 THERAPEUTIC ACTIVITIES: CPT | Mod: GP

## 2022-04-11 RX ORDER — AMOXICILLIN 250 MG
1 CAPSULE ORAL 2 TIMES DAILY
Status: DISCONTINUED | OUTPATIENT
Start: 2022-04-11 | End: 2022-04-12 | Stop reason: HOSPADM

## 2022-04-11 RX ORDER — POLYETHYLENE GLYCOL 3350 17 G/17G
17 POWDER, FOR SOLUTION ORAL DAILY
Status: DISCONTINUED | OUTPATIENT
Start: 2022-04-11 | End: 2022-04-12 | Stop reason: HOSPADM

## 2022-04-11 RX ADMIN — CINACALCET 30 MG: 30 TABLET, FILM COATED ORAL at 09:14

## 2022-04-11 RX ADMIN — SENNOSIDES AND DOCUSATE SODIUM 1 TABLET: 8.6; 5 TABLET ORAL at 20:10

## 2022-04-11 RX ADMIN — SEVELAMER CARBONATE 800 MG: 800 TABLET, FILM COATED ORAL at 13:14

## 2022-04-11 RX ADMIN — POLYETHYLENE GLYCOL 3350 17 G: 17 POWDER, FOR SOLUTION ORAL at 16:11

## 2022-04-11 RX ADMIN — CEFTRIAXONE SODIUM 1 G: 1 INJECTION, POWDER, FOR SOLUTION INTRAMUSCULAR; INTRAVENOUS at 12:03

## 2022-04-11 RX ADMIN — GABAPENTIN 100 MG: 100 CAPSULE ORAL at 09:14

## 2022-04-11 RX ADMIN — SERTRALINE HYDROCHLORIDE 100 MG: 50 TABLET ORAL at 09:14

## 2022-04-11 RX ADMIN — RIVAROXABAN 20 MG: 10 TABLET, FILM COATED ORAL at 16:11

## 2022-04-11 RX ADMIN — SEVELAMER CARBONATE 800 MG: 800 TABLET, FILM COATED ORAL at 16:11

## 2022-04-11 RX ADMIN — HEPARIN SODIUM 5000 UNITS: 10000 INJECTION, SOLUTION INTRAVENOUS; SUBCUTANEOUS at 14:26

## 2022-04-11 RX ADMIN — GABAPENTIN 100 MG: 100 CAPSULE ORAL at 20:10

## 2022-04-11 RX ADMIN — GABAPENTIN 100 MG: 100 CAPSULE ORAL at 13:14

## 2022-04-11 RX ADMIN — HEPARIN SODIUM 5000 UNITS: 10000 INJECTION, SOLUTION INTRAVENOUS; SUBCUTANEOUS at 06:08

## 2022-04-11 RX ADMIN — SEVELAMER CARBONATE 800 MG: 800 TABLET, FILM COATED ORAL at 09:14

## 2022-04-11 RX ADMIN — MICONAZOLE NITRATE: 20 POWDER TOPICAL at 09:14

## 2022-04-11 ASSESSMENT — ACTIVITIES OF DAILY LIVING (ADL)
ADLS_ACUITY_SCORE: 19

## 2022-04-11 NOTE — PLAN OF CARE
Problem: Oral Intake Inadequate (Acute Kidney Injury/Impairment)  Goal: Optimal Nutrition Intake  Outcome: Ongoing, Progressing   Pt with good PO intake        -Pt denies pain  -BPs WDL  -Up with assist of one

## 2022-04-11 NOTE — PROGRESS NOTES
[facility]  [facility]   Follow up    Bess Enamorado   MRN: [id number]  : 1975   DOA: 2022   CC: ARF      Assessment and Plan:  46 year old female    1. ARF; has normal underlying renal function with CR 0.7-0.8; ARF due to ATN in setting of infection which is now recovering  2. Volume status; elevated on admission and wt overall down; now appears pretty euvolemic and good urine output on own; holding diuretics for now  3. Chronic lymphedema/stasis changes; local cares; concern for ?calciphylaxis with painful areas and appearance on nuclear med study (NM 3 phase bone scan  showed numerous contiguous irregularly shaped areas of radiotracer activity within the soft tissues of both legs including medial aspects of both thighs as well as in both calves, left greater than right. findings consistent with calciphylaxis); plan derm referral as OP  4. HTN; bp soft initially and meds held and requiring some midodrine; now bp more stable; on metoprolol but diuretics being held  5. Nephrolithiasis; with h/o microscopic hematuria  6. OSAS: on CPAP  7. Obesity  8. MS  9. Hyponatremia; resolved  10. Acidosis; resolved  11. UTI; on abx  12. Hyperparathyroidism: primary vs secondary; complicated by ?calciphylaxis; on non calcium binder and started sensipar; will need follow up and more investigation depending on course  13. Neuropathy: on renally dosed gabapentin  14. History of pulmonary embolism: on Xarelto  15. Anemia; following hgb    Subjective  Pt new to me; chart and meds reviewed  Pt wondering about medication regimen when she is discharged  Legs are not really painful today and overall better; she was having trouble even having skin touched  No N/V  No CP or SOB    Objective    Vital signs in last 24 hours  Temp:  [98.3  F (36.8  C)] 98.3  F (36.8  C)  Pulse:  [] 109  Resp:  [16-17] 17  BP: (106-132)/(55-79) 130/77  SpO2:  [91 %-93 %] 91  %      Intake/Output last 3 shifts  I/O last 3 completed shifts:  In: 1603 [P.O.:1600; I.V.:3]  Out: 3150 [Urine:3150]  Intake/Output this shift:  No intake/output data recorded.    Physical Exam  Alert/oriented x 3, awake, NAD  CV: RRR without murmur or rub  Lung: clear and equal; no extra sounds  Ab: soft and NT; not distended; normal bs  Ext: some leg edema and severe stasis changes; no active cellulitis and well perfused  Skin; stasis changes  Pertinent Labs     Last Renal Panel:  Sodium   Date Value Ref Range Status   04/11/2022 142 136 - 145 mmol/L Final   03/07/2021 139 133 - 144 mmol/L Final     Potassium   Date Value Ref Range Status   04/11/2022 3.8 3.5 - 5.0 mmol/L Final   03/07/2021 4.3 3.4 - 5.3 mmol/L Final     Chloride   Date Value Ref Range Status   04/11/2022 108 (H) 98 - 107 mmol/L Final   03/07/2021 105 94 - 109 mmol/L Final     Carbon Dioxide   Date Value Ref Range Status   03/07/2021 30 20 - 32 mmol/L Final     Carbon Dioxide (CO2)   Date Value Ref Range Status   04/11/2022 23 22 - 31 mmol/L Final     Anion Gap   Date Value Ref Range Status   04/11/2022 11 5 - 18 mmol/L Final   03/07/2021 4 3 - 14 mmol/L Final     Glucose   Date Value Ref Range Status   04/11/2022 100 70 - 125 mg/dL Final   03/07/2021 99 70 - 99 mg/dL Final     Urea Nitrogen   Date Value Ref Range Status   04/11/2022 50 (H) 8 - 22 mg/dL Final   03/07/2021 12 7 - 30 mg/dL Final     Creatinine   Date Value Ref Range Status   04/11/2022 1.65 (H) 0.60 - 1.10 mg/dL Final   03/07/2021 0.62 0.52 - 1.04 mg/dL Final     GFR Estimate   Date Value Ref Range Status   04/11/2022 38 (L) >60 mL/min/1.73m2 Final     Comment:     Effective December 21, 2021 eGFRcr in adults is calculated using the 2021 CKD-EPI creatinine equation which includes age and gender ( et al., NEJM, DOI: 10.1056/VHQTyp6942886)   03/07/2021 >90 >60 mL/min/[1.73_m2] Final     Comment:     Non  GFR Calc  Starting 12/18/2018, serum creatinine based  estimated GFR (eGFR) will be   calculated using the Chronic Kidney Disease Epidemiology Collaboration   (CKD-EPI) equation.       Calcium   Date Value Ref Range Status   04/11/2022 10.3 8.5 - 10.5 mg/dL Final   03/07/2021 9.4 8.5 - 10.1 mg/dL Final     Phosphorus   Date Value Ref Range Status   04/11/2022 3.2 2.5 - 4.5 mg/dL Final     Albumin   Date Value Ref Range Status   04/11/2022 2.8 (L) 3.5 - 5.0 g/dL Final   03/07/2021 3.1 (L) 3.4 - 5.0 g/dL Final     Recent Labs   Lab 04/07/22  0754 04/06/22  1107   HGB 11.5* 13.3          I reviewed all lab results  Teresa Winkler MD

## 2022-04-11 NOTE — PROGRESS NOTES
Daily Progress Note        CODE STATUS:  Full Code    04/07/22  Assessment/Plan:  46 year old female with PMH of hypertension, CHF, nephrolithiasis, PE on xarelto, NARCISA, multiple sclerosis, chronic lymphedema who was referred to our ED by her cardiology clinic for evaluation of abnormal labs.      Acute renal failure:  High AG metabolic acidosis:  -- Presented with creatinine of 6.6. It was normal in Feb 2022.  Likely pre-renal due to overdiuresis. Bumex has recently been increased per patient. Hypotension might have contributed to this as the patient reports low BP at home for the last couple of wks. Total CK normal  -- Improving. Creatinine 6.6-->5.7->4.4-->2.7--> 2.0-->1.6. CT shows no hydronephrosis  -- Holding bumex and ACEI  -- Gentle hydration. IVF rate increased to 125ml/hr on 4/7. Brar for accurate I/O.  -- Nephrology consulted. Appreciate input     Hypotension:  -- Lactic acid is normal. Holding metoprolol  -- Given NS bolus 500ml x 2 on 4/7. Started on Midodrine. Patient reports no lightheadedness. No chest pain or shortness of breath  -- Cont with IVF.   -- I donot suspect PE as the patient had been on Xarelto, and has not been hypoxic  -- Checked cortisol level and TSH- normal. Blood cultures- NTD    Hyponatremia:  -- Sodium of 127 noted. Suspect due to intravascular vol depletion from overdiuresis  -- Improved with IVF.     UTI:  -- Abnormal UA noted  -- Cont IV rocephin awaiting culture report/ U/C growing GNB and GPC. Final culture report is pending.     Diarrhea:  -- No diarrhea since admission  -- Likely viral GE.      H/O PE:  -- Was holding xarelto due to low GFR. Used prophylactic heparin for now. As the GFR improved, will resume xarelto. Pharmacy recommending 20mg daily with food.     HFpEF:  -- BNP is elevated in the context of renal failure. I dont belive she is in acute CHF  -- Holding diuretics for now     NARCISA:  -- CPAP at night     Lymphedema:  Skin lesions:  -- Consult lymphedema  PT/OT  -- NM3 phase bone scan on 4/8 showed numerous contiguous irregularly shaped areas of radiotracer activity within th soft tissues of both legs including medial aspects of both thighs as well as both calves, left greater than right. Findings consistent with calciphylaxis. Patient should be referred to see dermatology service as outpatient for possible biopsy per nephrology.      Disposition; Tomorrow- Home with HHC vs TCU. Recommendation from therapy was TCU up until 4/8. Patient feels better, and wonders if she could go home directly.       Subjective:  Interval History: Patient seen and examined this morning. No new complaints overnight. Reports doing better.     Review of Systems:   As mentioned in subjective.    Patient Active Problem List   Diagnosis     Multiple sclerosis (H)     Polycystic ovaries     Constipation     CARDIOVASCULAR SCREENING; LDL GOAL LESS THAN 160     Anxiety     Restless legs     Leg edema     Eating disorder     Papanicolaou smear of cervix with low grade squamous intraepithelial lesion (LGSIL)     Morbid obesity (H)     Peroneal tendon rupture     Benign essential hypertension     NARCISA (obstructive sleep apnea)     Moderate episode of recurrent major depressive disorder (H)     Cellulitis of abdominal wall     Sepsis (H)     History of abnormal cervical Pap smear     Generalized anxiety disorder     Lymphedema of both lower extremities     Tachycardia     SIRS (systemic inflammatory response syndrome) (H)     Cellulitis of right leg     Cellulitis     Pneumonia due to infectious organism, unspecified laterality, unspecified part of lung     Acute pulmonary embolism without acute cor pulmonale, unspecified pulmonary embolism type (H)     COVID-19     Chronic diastolic congestive heart failure (H)     Acute respiratory failure with hypoxia (H)     NSTEMI (non-ST elevated myocardial infarction) (H)     Dehydration     Hyperkalemia     Acute renal failure, unspecified acute renal failure  type (H)     Hypotension due to hypovolemia     History of pulmonary embolism     Ureteral stone     Urinary tract infection     Hydronephrosis with urinary obstruction due to ureteral calculus     Chronic heart failure with preserved ejection fraction (H)     Splenomegaly     Hyponatremia     Renal insufficiency       Scheduled Meds:    cefTRIAXone  1 g Intravenous Q24H     cinacalcet  30 mg Oral Once per day on Mon Wed Fri     gabapentin  100 mg Oral TID     heparin ANTICOAGULANT  5,000 Units Subcutaneous Q8H     [Held by provider] metoprolol succinate ER  25 mg Oral Daily     miconazole   Topical BID     sertraline  100 mg Oral Daily     sevelamer carbonate  800 mg Oral TID w/meals     sodium chloride (PF)  3 mL Intracatheter Q8H     Continuous Infusions:    PRN Meds:.acetaminophen, lidocaine 4%, lidocaine (buffered or not buffered), melatonin, midodrine, sodium chloride (PF)    Objective:  Vital signs in last 24 hours:  Temp:  [98.3  F (36.8  C)] 98.3  F (36.8  C)  Pulse:  [] 102  Resp:  [16] 16  BP: (106-132)/(55-79) 106/55  SpO2:  [93 %] 93 %        Intake/Output Summary (Last 24 hours) at 4/7/2022 1251  Last data filed at 4/7/2022 0916  Gross per 24 hour   Intake 2399 ml   Output 2400 ml   Net -1 ml       Physical Exam:  HEENT: NC/AT                 Eyes-  Pupil round and reactive to light bilaterally                  Neck- supple, no JVP elevation,                  Sclera- anicteric                 Oropharynx- moist and pink  CHEST: Clear to auscultation bilateral anteriorly, no ronchi or wheezing  HEART: S1S2 normal, regular.   ABDMN: Soft. Non-tender, non-distended.  No guarding or rigidity. Bowel sounds- active  EXTRM: Chronic LE lymphedema. Chronic skin erythema on both sides, left >right. Better today.  NEURO: Alert and awake. Cranial nerves II-XII grossly intact. No focal neurological deficit.    Lab Results:(I have personally reviewed the results)    Recent Results (from the past 24 hour(s))    Ionized Calcium    Collection Time: 04/10/22  3:15 PM   Result Value Ref Range    Calcium, Ionized pH 7.4 1.30 1.11 - 1.30 mmol/L    pH 7.42 7.35 - 7.45    Calcium, Ionized Measured 1.28 1.11 - 1.30 mmol/L   Renal panel    Collection Time: 04/11/22  6:38 AM   Result Value Ref Range    Sodium 142 136 - 145 mmol/L    Potassium 3.8 3.5 - 5.0 mmol/L    Chloride 108 (H) 98 - 107 mmol/L    Carbon Dioxide (CO2) 23 22 - 31 mmol/L    Anion Gap 11 5 - 18 mmol/L    Urea Nitrogen 50 (H) 8 - 22 mg/dL    Creatinine 1.65 (H) 0.60 - 1.10 mg/dL    Calcium 10.3 8.5 - 10.5 mg/dL    Glucose 100 70 - 125 mg/dL    Albumin 2.8 (L) 3.5 - 5.0 g/dL    Phosphorus 3.2 2.5 - 4.5 mg/dL    GFR Estimate 38 (L) >60 mL/min/1.73m2   Platelet count    Collection Time: 04/11/22  6:38 AM   Result Value Ref Range    Platelet Count 243 150 - 450 10e3/uL       All laboratory and imaging data in the past 24 hours reviewed  Serum Glucose range:   Recent Labs   Lab 04/11/22  0638 04/10/22  0647 04/09/22 0638 04/08/22  1332    106 101 107*     ABG:   Recent Labs   Lab 04/10/22  1515   PH 7.42     CBC:   Recent Labs   Lab 04/11/22 0638 04/07/22  0754 04/06/22  1107   WBC  --  11.0 9.6   HGB  --  11.5* 13.3   HCT  --  34.7* 40.3   MCV  --  81 82    270 236   NEUTROPHIL  --   --  82   LYMPH  --   --  8   MONOCYTE  --   --  8   EOSINOPHIL  --   --  2     Chemistry:   Recent Labs   Lab 04/11/22 0638 04/10/22  0647 04/09/22  0638 04/07/22  0754 04/06/22  2048    142 142   < > 128*   POTASSIUM 3.8 3.7 3.9   < > 4.1   CHLORIDE 108* 106 105   < > 89*   CO2 23 22 26   < > 18*   BUN 50* 77* 109*   < > 155*   CR 1.65* 2.05* 2.73*   < > 5.98*   GFRESTIMATED 38* 29* 21*   < > 8*   EVITA 10.3 10.1 9.6   < > 9.3   MAG  --   --   --   --  2.0   ALBUMIN 2.8* 2.8*  --   --   --     < > = values in this interval not displayed.     Coags:  No results for input(s): INR, PROTIME, PTT in the last 168 hours.    Invalid input(s): APTT  Cardiac Markers:  Recent  Labs   Lab 04/06/22 2048   TROPONINI 0.03          XR Chest 2 Views    Result Date: 3/23/2022  EXAM: XR CHEST 2 VW LOCATION: [facility] DATE/TIME: 3/23/2022 11:02 AM INDICATION: new cough x 3 4 days, recent pneumonia COMPARISON: 02/20/2022.     IMPRESSION: Shallow inspiration. Lungs are clear. Nothing for pneumonia on today's study. No new findings.    Abd/pelvis CT no contrast - Stone Protocol    Result Date: 4/6/2022  EXAM: CT ABDOMEN PELVIS W/O CONTRAST LOCATION: [facility] DATE/TIME: 4/6/2022 10:45 AM INDICATION: Diarrhea, dehydration, elevated creatinine and history of renal stones. COMPARISON: 02/21/2022. TECHNIQUE: CT scan of the abdomen and pelvis was performed without IV contrast. Multiplanar reformats were obtained. Dose reduction techniques were used. CONTRAST: None. FINDINGS: LOWER CHEST: Lung bases are clear. Dense calcification of the mitral annulus. HEPATOBILIARY: Mild diffuse decreased attenuation in liver consistent with hepatic steatosis. No focal hepatic mass. Status post cholecystectomy. No biliary dilatation. PANCREAS: Normal. SPLEEN: Borderline splenomegaly with the spleen measuring 14.5 x 6.4 x 11.9 cm. No splenic lesions. ADRENAL GLANDS: Normal. KIDNEYS/BLADDER: Mild prominence of the right renal pelvis not significantly changed from the prior study. The stone that was seen in the right proximal ureter on the prior study is no longer present. There are no renal or ureteral stones on either side.  No left-sided hydronephrosis. No suspicious renal masses. No bladder stone or mass. BOWEL: No bowel obstruction or free intraperitoneal air. Scattered colonic diverticula without evidence for diverticulitis or abscess. Normal appendix. LYMPH NODES: There are normal-sized retroperitoneal lymph nodes no lymphadenopathy. VASCULATURE: Unremarkable. PELVIC ORGANS: Again noted is a 9.7 x 8.9 cm adnexal mass containing fat, soft tissue elements and  calcification consistent with a mature teratoma. It appears unchanged from the prior study. This may arise from the left ovary but its large size puts it in the midline makes it difficult to say which ovary it arises from. MUSCULOSKELETAL: Mild degenerative change in the lumbar spine with grade 1 anterolisthesis of L4 on L5 likely secondary to facet arthropathy. No acute fracture or suspicious bony lesion.     IMPRESSION: 1.  The right proximal ureteral stone seen on the prior study is no longer present. There is mild prominence of the right upper collecting system but no stones or masses are seen in either kidney no left-sided hydronephrosis. 2.  Stable large mature teratoma (dermoid cyst) in the pelvis. 3.  No evidence for bowel obstruction, colitis, appendicitis or diverticulitis. 4.  Borderline splenomegaly.     XR KUB    Result Date: 3/23/2022  EXAM: XR KUB LOCATION: [facility] DATE/TIME: 3/23/2022 11:02 AM INDICATION:  Calculus of ureter COMPARISON: CT 02/21/2022     IMPRESSION: Renal collecting systems are significantly obscured by large body habitus as well as stool and bowel. I do not see evidence of the right ureteral stone is seen on prior CT although this certainly could be obscured. Consider follow-up CT. Nothing for bowel obstruction. Calcifications in the pelvis consistent with the known dermoid seen on CT.      Latest radiology report personally reviewed.    Note created using dragon voice recognition software so sounds alike errors may have escaped editing.      04/11/2022   Bienvenido Morris MD  Hospitalist, [facility]  Pager: [phone]

## 2022-04-11 NOTE — PLAN OF CARE
Goal Outcome Evaluation:      Problem: Plan of Care - These are the overarching goals to be used throughout the patient stay.    Goal: Optimal Comfort and Wellbeing  Outcome: Ongoing, Progressing     Problem: Renal Function Impairment (Acute Kidney Injury/Impairment)  Goal: Effective Renal Function  Outcome: Ongoing, Progressing  Intervention: Monitor and Support Renal Function  Recent Flowsheet Documentation  Taken 4/10/2022 2145 by Kristine Jorge RN  Medication Review/Management: medications reviewed      Patient still oliguric. Creatinine level trending down. Still no BM, denies discomfort, bowel sounds present. Abdominal fold yeast infection improving. Patient resting, no significant changes.

## 2022-04-12 ENCOUNTER — APPOINTMENT (OUTPATIENT)
Dept: PHYSICAL THERAPY | Facility: HOSPITAL | Age: 47
DRG: 683 | End: 2022-04-12
Payer: COMMERCIAL

## 2022-04-12 ENCOUNTER — APPOINTMENT (OUTPATIENT)
Dept: OCCUPATIONAL THERAPY | Facility: HOSPITAL | Age: 47
DRG: 683 | End: 2022-04-12
Payer: COMMERCIAL

## 2022-04-12 VITALS
DIASTOLIC BLOOD PRESSURE: 78 MMHG | HEART RATE: 104 BPM | BODY MASS INDEX: 54.57 KG/M2 | RESPIRATION RATE: 20 BRPM | SYSTOLIC BLOOD PRESSURE: 137 MMHG | WEIGHT: 293 LBS | OXYGEN SATURATION: 93 % | TEMPERATURE: 98 F

## 2022-04-12 LAB
ANION GAP SERPL CALCULATED.3IONS-SCNC: 8 MMOL/L (ref 5–18)
BACTERIA BLD CULT: NO GROWTH
BACTERIA BLD CULT: NO GROWTH
BUN SERPL-MCNC: 39 MG/DL (ref 8–22)
CALCIUM SERPL-MCNC: 10.2 MG/DL (ref 8.5–10.5)
CHLORIDE BLD-SCNC: 108 MMOL/L (ref 98–107)
CO2 SERPL-SCNC: 26 MMOL/L (ref 22–31)
CREAT SERPL-MCNC: 1.4 MG/DL (ref 0.6–1.1)
GFR SERPL CREATININE-BSD FRML MDRD: 46 ML/MIN/1.73M2
GLUCOSE BLD-MCNC: 108 MG/DL (ref 70–125)
HOLD SPECIMEN: NORMAL
POTASSIUM BLD-SCNC: 3.7 MMOL/L (ref 3.5–5)
SODIUM SERPL-SCNC: 142 MMOL/L (ref 136–145)

## 2022-04-12 PROCEDURE — 99239 HOSP IP/OBS DSCHRG MGMT >30: CPT | Performed by: INTERNAL MEDICINE

## 2022-04-12 PROCEDURE — 80048 BASIC METABOLIC PNL TOTAL CA: CPT | Performed by: INTERNAL MEDICINE

## 2022-04-12 PROCEDURE — 250N000013 HC RX MED GY IP 250 OP 250 PS 637: Performed by: INTERNAL MEDICINE

## 2022-04-12 PROCEDURE — 99232 SBSQ HOSP IP/OBS MODERATE 35: CPT | Performed by: INTERNAL MEDICINE

## 2022-04-12 PROCEDURE — 97530 THERAPEUTIC ACTIVITIES: CPT | Mod: GP

## 2022-04-12 PROCEDURE — 36415 COLL VENOUS BLD VENIPUNCTURE: CPT | Performed by: INTERNAL MEDICINE

## 2022-04-12 PROCEDURE — 250N000011 HC RX IP 250 OP 636: Performed by: INTERNAL MEDICINE

## 2022-04-12 PROCEDURE — 97535 SELF CARE MNGMENT TRAINING: CPT | Mod: GO

## 2022-04-12 RX ORDER — CINACALCET 30 MG/1
30 TABLET, FILM COATED ORAL
Qty: 30 TABLET | Refills: 0 | Status: SHIPPED | OUTPATIENT
Start: 2022-04-13 | End: 2022-08-24

## 2022-04-12 RX ORDER — SEVELAMER CARBONATE 800 MG/1
800 TABLET, FILM COATED ORAL
Qty: 90 TABLET | Refills: 0 | Status: SHIPPED | OUTPATIENT
Start: 2022-04-12 | End: 2022-04-12

## 2022-04-12 RX ORDER — SEVELAMER CARBONATE 800 MG/1
800 TABLET, FILM COATED ORAL DAILY
Qty: 90 TABLET | Refills: 0 | Status: SHIPPED | OUTPATIENT
Start: 2022-04-12 | End: 2022-08-24

## 2022-04-12 RX ORDER — GABAPENTIN 100 MG/1
100 CAPSULE ORAL 3 TIMES DAILY
Qty: 540 CAPSULE | Refills: 1
Start: 2022-04-12 | End: 2023-12-21

## 2022-04-12 RX ADMIN — SERTRALINE HYDROCHLORIDE 100 MG: 50 TABLET ORAL at 08:26

## 2022-04-12 RX ADMIN — SENNOSIDES AND DOCUSATE SODIUM 1 TABLET: 8.6; 5 TABLET ORAL at 08:26

## 2022-04-12 RX ADMIN — GABAPENTIN 100 MG: 100 CAPSULE ORAL at 08:26

## 2022-04-12 RX ADMIN — CEFTRIAXONE SODIUM 1 G: 1 INJECTION, POWDER, FOR SOLUTION INTRAMUSCULAR; INTRAVENOUS at 12:32

## 2022-04-12 RX ADMIN — POLYETHYLENE GLYCOL 3350 17 G: 17 POWDER, FOR SOLUTION ORAL at 08:25

## 2022-04-12 RX ADMIN — MICONAZOLE NITRATE: 20 POWDER TOPICAL at 08:26

## 2022-04-12 RX ADMIN — SEVELAMER CARBONATE 800 MG: 800 TABLET, FILM COATED ORAL at 08:25

## 2022-04-12 RX ADMIN — GABAPENTIN 100 MG: 100 CAPSULE ORAL at 13:17

## 2022-04-12 ASSESSMENT — ACTIVITIES OF DAILY LIVING (ADL)
ADLS_ACUITY_SCORE: 19
ADLS_ACUITY_SCORE: 19
ADLS_ACUITY_SCORE: 17
ADLS_ACUITY_SCORE: 19
ADLS_ACUITY_SCORE: 17
ADLS_ACUITY_SCORE: 19
ADLS_ACUITY_SCORE: 19
ADLS_ACUITY_SCORE: 17
ADLS_ACUITY_SCORE: 19
ADLS_ACUITY_SCORE: 17

## 2022-04-12 NOTE — PLAN OF CARE
Physical Therapy Discharge Summary    Reason for therapy discharge:    Discharged to home with home therapy.    Progress towards therapy goal(s). See goals on Care Plan in [facility] electronic health record for goal details.  Goals partially met.  Barriers to achieving goals:   discharge from facility.    Therapy recommendation(s):    Continued therapy is recommended.  Rationale/Recommendations:  recommend home PT.     Thelma Peñaloza, PT  4/12/2022

## 2022-04-12 NOTE — PROGRESS NOTES
[facility]  [facility]   Follow up    Bess Enamorado   MRN: [id number]  : 1975   DOA: 2022   CC: ARF      Assessment and Plan:  46 year old female    1. ARF; has normal underlying renal function with CR 0.7-0.8; ARF due to ATN in setting of infection; improving  2. Volume status; elevated on admission and wt overall down; now appears pretty euvolemic and good urine output on own; holding diuretics for now and will follow as OP to see if needs additional diuresis  3. Chronic lymphedema/stasis changes; local cares; concern for ?calciphylaxis with painful areas and appearance on nuclear med study (NM 3 phase bone scan  showed numerous contiguous irregularly shaped areas of radiotracer activity within the soft tissues of both legs including medial aspects of both thighs as well as in both calves, left greater than right. findings consistent with calciphylaxis); plan derm referral as OP  4. HTN; bp soft initially and meds held and requiring some midodrine; now bp more stable; on metoprolol but diuretics being held  5. Nephrolithiasis; with h/o microscopic hematuria  6. OSAS: on CPAP  7. Obesity  8. MS  9. Hyponatremia; resolved  10. Acidosis; resolved  11. UTI; on abx  12. Hyperparathyroidism: primary vs secondary; complicated by ?calciphylaxis; on non calcium binder (change to once a day with biggest meal as her phos clearance should be decent at this GFR) and started sensipar; will need follow up and more investigation depending on course  13. Neuropathy: on renally dosed gabapentin  14. History of pulmonary embolism: on Xarelto  15. Anemia; following hgb  16. Dispo; ok with discharge today; will enter order for follow up with Dr. Clark--d/w pt and Dr. Morris    Subjective  Had decent noc; no issues; legs are not painful today; answered questions about follow up and diet  Would not restrict to renal diet    Objective    Vital signs in  last 24 hours  Temp:  [98  F (36.7  C)-98.7  F (37.1  C)] 98  F (36.7  C)  Pulse:  [103-112] 104  Resp:  [18-20] 20  BP: (125-137)/(73-91) 137/78  SpO2:  [93 %-94 %] 93 %      Intake/Output last 3 shifts  I/O last 3 completed shifts:  In: 1640 [P.O.:1640]  Out: 2450 [Urine:2450]  Intake/Output this shift:  No intake/output data recorded.    Physical Exam  Alert/oriented x 3, awake, NAD  CV: RRR without murmur or rub  Lung: clear and equal; no extra sounds  Ab: soft and NT; not distended; normal bs  Ext: some leg edema and severe stasis changes; no active cellulitis and well perfused  Skin; stasis changes  Pertinent Labs     Last Renal Panel:  Sodium   Date Value Ref Range Status   04/12/2022 142 136 - 145 mmol/L Final   03/07/2021 139 133 - 144 mmol/L Final     Potassium   Date Value Ref Range Status   04/12/2022 3.7 3.5 - 5.0 mmol/L Final   03/07/2021 4.3 3.4 - 5.3 mmol/L Final     Chloride   Date Value Ref Range Status   04/12/2022 108 (H) 98 - 107 mmol/L Final   03/07/2021 105 94 - 109 mmol/L Final     Carbon Dioxide   Date Value Ref Range Status   03/07/2021 30 20 - 32 mmol/L Final     Carbon Dioxide (CO2)   Date Value Ref Range Status   04/12/2022 26 22 - 31 mmol/L Final     Anion Gap   Date Value Ref Range Status   04/12/2022 8 5 - 18 mmol/L Final   03/07/2021 4 3 - 14 mmol/L Final     Glucose   Date Value Ref Range Status   04/12/2022 108 70 - 125 mg/dL Final   03/07/2021 99 70 - 99 mg/dL Final     Urea Nitrogen   Date Value Ref Range Status   04/12/2022 39 (H) 8 - 22 mg/dL Final   03/07/2021 12 7 - 30 mg/dL Final     Creatinine   Date Value Ref Range Status   04/12/2022 1.40 (H) 0.60 - 1.10 mg/dL Final   03/07/2021 0.62 0.52 - 1.04 mg/dL Final     GFR Estimate   Date Value Ref Range Status   04/12/2022 46 (L) >60 mL/min/1.73m2 Final     Comment:     Effective December 21, 2021 eGFRcr in adults is calculated using the 2021 CKD-EPI creatinine equation which includes age and gender (Maria Teresa et al., NEJM, DOI:  10.1056/MRSIfy1734909)   03/07/2021 >90 >60 mL/min/[1.73_m2] Final     Comment:     Non  GFR Calc  Starting 12/18/2018, serum creatinine based estimated GFR (eGFR) will be   calculated using the Chronic Kidney Disease Epidemiology Collaboration   (CKD-EPI) equation.       Calcium   Date Value Ref Range Status   04/12/2022 10.2 8.5 - 10.5 mg/dL Final   03/07/2021 9.4 8.5 - 10.1 mg/dL Final     Phosphorus   Date Value Ref Range Status   04/11/2022 3.2 2.5 - 4.5 mg/dL Final     Albumin   Date Value Ref Range Status   04/11/2022 2.8 (L) 3.5 - 5.0 g/dL Final   03/07/2021 3.1 (L) 3.4 - 5.0 g/dL Final     Recent Labs   Lab 04/07/22  0754 04/06/22  1107   HGB 11.5* 13.3          I reviewed all lab results  Teresa Winkler MD

## 2022-04-12 NOTE — PLAN OF CARE
Occupational Therapy Discharge Summary    Reason for therapy discharge:    Discharged to home.    Progress towards therapy goal(s). See goals on Care Plan in [facility] electronic health record for goal details.  Goals partially met.  Barriers to achieving goals:   discharge from facility.    Therapy recommendation(s):    No further therapy is recommended.    Goal Outcome Evaluation:

## 2022-04-12 NOTE — PROGRESS NOTES
Care Management Discharge Note    Discharge Date: 04/12/2022       Discharge Disposition:  home    Discharge Services:  RN,PT,OT, HHA    Discharge DME:      Discharge Transportation:  family    Private pay costs discussed: Not applicable      Handoff Referral Completed: Yes    Additional Information:  CM sent home care orders to [facility] to resume RN,PT,OT,HHA. Pt agreeable to services. Pt's sister to transport at 1:30.        KAYLEE Godinez

## 2022-04-12 NOTE — DISCHARGE SUMMARY
[facility] MEDICINE  DISCHARGE SUMMARY     Primary Care Physician: Mikala Luna  Admission Date: 4/6/2022   Discharge Provider: YOLI Connor Discharge Date: 4/12/2022   Diet: Renal diet   Code Status: Full Code   Activity: DCACTIVITY: Activity as tolerated        Condition at Discharge: Stable     REASON FOR PRESENTATION(See Admission Note for Details)   Abnormal labs, fatigue    PRINCIPAL & ACTIVE DISCHARGE DIAGNOSES     Active Problems:    Urinary tract infection    Splenomegaly    Hyponatremia    Renal renal failure      PENDING LABS     Unresulted Labs Ordered in the Past 30 Days of this Admission     Date and Time Order Name Status Description    4/7/2022  9:13 AM Blood Culture Hand, Right Preliminary     4/7/2022  9:13 AM Blood Culture Arm, Right Preliminary     4/6/2022 11:21 AM Urine Culture Preliminary             PROCEDURES ( this hospitalization only)          RECOMMENDATIONS TO OUTPATIENT PROVIDER FOR F/U VISIT     Follow-up Appointments     Follow-up and recommended labs and tests       Follow up with primary care provider, Mikala Luna, within 7 days for   hospital follow- up.  The following labs/tests are recommended: BMP.  Follow up with Dr Clark (associated nephrology group) in 2 wks or as   planned.                 DISPOSITION     Home with [facility]    SUMMARY OF HOSPITAL COURSE:      46 year old female with PMH of hypertension, CHF, nephrolithiasis, PE on xarelto, NARCISA, multiple sclerosis, chronic lymphedema who was referred to our ED by her cardiology clinic for evaluation of abnormal labs.      Acute renal failure:  High AG metabolic acidosis:  -- Presented with creatinine of 6.6. It was normal in Feb 2022.  Likely pre-renal due to overdiuresis. Bumex has recently been increased per patient. Hypotension might have contributed to this as the patient reports low BP at home for the last couple of wks. Total CK normal  -- Improving. Creatinine  6.6-->5.7->4.4-->2.7--> 2.0-->1.6-- 1.4. CT shows no hydronephrosis  -- Holding bumex and ACEI. PCP to resume bumex prn base on volume status  -- Nephrology consulted. Appreciate input. Started on Renvela and Sensipar per nephrology. Follow up in the nephrology clinic as planned.      Hypotension:  -- Lactic acid is normal. Holding metoprolol  -- Given NS bolus 500ml x 2 on 4/7. Started on Midodrine. Patient reports no lightheadedness. No chest pain or shortness of breath  -- Cont with IVF.   -- I donot suspect PE as the patient had been on Xarelto, and has not been hypoxic  -- Checked cortisol level and TSH- normal. Blood cultures- NTD     Hyponatremia:  -- Sodium of 127 noted. Suspect due to intravascular vol depletion from overdiuresis  -- Improved with IVF.     UTI:  -- Abnormal UA noted  -- Treated with IV rocephin awaiting culture report/ U/C growing GNB and GPC. Final culture report is pending. Discharging home on 4 more days of PO augmentin     Diarrhea:  -- No diarrhea since admission  -- Likely viral GE.      H/O PE:  -- Was holding xarelto due to low GFR. As GFR improved, started back on Xarelto from 4/11     HFpEF:  -- BNP is elevated in the context of renal failure. I dont believe she is in acute CHF  -- Holding diuretics for now as mtiesh.     NARCISA:  -- CPAP at night     Lymphedema:  Skin lesions:  -- Consult lymphedema PT/OT  -- NM3 phase bone scan on 4/8 showed numerous contiguous irregularly shaped areas of radiotracer activity within th soft tissues of both legs including medial aspects of both thighs as well as both calves, left greater than right. Findings consistent with calciphylaxis. Patient to follow up with nephrology in the clinic. Neprology to re-assess and refer for skin biopsy if needed.     Discharge Medications with Med changes:     Current Discharge Medication List      START taking these medications    Details   amoxicillin-clavulanate (AUGMENTIN) 875-125 MG tablet Take 1 tablet by  mouth in the morning and 1 tablet in the evening. Do all this for 4 days.  Qty: 8 tablet, Refills: 0    Associated Diagnoses: Urinary tract infection without hematuria, site unspecified      cinacalcet (SENSIPAR) 30 MG tablet Take 1 tablet (30 mg) by mouth three times a week  Qty: 30 tablet, Refills: 0    Associated Diagnoses: Renal insufficiency      sevelamer carbonate (RENVELA) 800 MG tablet Take 1 tablet (800 mg) by mouth daily (with the biggest meal of the day)  Qty: 90 tablet, Refills: 0    Associated Diagnoses: Renal insufficiency         CONTINUE these medications which have CHANGED    Details   gabapentin (NEURONTIN) 100 MG capsule Take 1 capsule (100 mg) by mouth in the morning and 1 capsule (100 mg) at noon and 1 capsule (100 mg) in the evening.  Qty: 540 capsule, Refills: 1    Associated Diagnoses: Multiple sclerosis (H)         CONTINUE these medications which have NOT CHANGED    Details   ACE/ARB/ARNI NOT PRESCRIBED (INTENTIONAL) Please choose reason not prescribed from choices below.    Associated Diagnoses: Chronic diastolic congestive heart failure (H)      acetaminophen (TYLENOL) 325 MG tablet Take 650 mg by mouth every 6 hours as needed for mild pain       Cholecalciferol (VITAMIN D3 PO) Take 10,000 Units by mouth daily       desogestrel-ethinyl estradiol (APRI) 0.15-30 MG-MCG tablet TAKE 1 TABLET DAILY CONTINUOUSLY-OMIT PLACEBPO TABS UNTIL AFTER 3 MONTHS OF HORMONE TABS  Qty: 112 tablet, Refills: 3    Associated Diagnoses: Polycystic ovaries; Encounter for surveillance of contraceptive pills      metoprolol succinate ER (TOPROL-XL) 25 MG 24 hr tablet TAKE 1 TABLET(25 MG) BY MOUTH DAILY  Qty: 90 tablet, Refills: 1    Comments: ZERO refills remain on this prescription. Your patient is requesting advance approval of refills for this medication to PREVENT ANY MISSED DOSES  Associated Diagnoses: Benign essential hypertension; Sinus tachycardia      miconazole (MICATIN) 2 % external powder Apply  topically 2 times daily  Qty:      Associated Diagnoses: Morbid obesity (H)      nystatin (MYCOSTATIN) 681846 UNIT/GM external powder Apply topically 2 times daily  Qty: 30 g, Refills: 6    Associated Diagnoses: Intertrigo      rivaroxaban ANTICOAGULANT (XARELTO) 20 MG TABS tablet Take 1 tablet (20 mg) by mouth daily (with dinner)  Qty: 90 tablet, Refills: 0    Associated Diagnoses: Acute pulmonary embolism without acute cor pulmonale, unspecified pulmonary embolism type (H)      sertraline (ZOLOFT) 100 MG tablet Take 1 tablet (100 mg) by mouth daily  Qty: 90 tablet, Refills: 3    Associated Diagnoses: Moderate episode of recurrent major depressive disorder (H); Generalized anxiety disorder         STOP taking these medications       bumetanide (BUMEX) 2 MG tablet Comments:   Reason for Stopping:         fluconazole (DIFLUCAN) 150 MG tablet Comments:   Reason for Stopping:                     Rationale for medication changes:      Please see the summary above        Consults   Nephrology       Immunizations given this encounter     Most Recent Immunizations   Administered Date(s) Administered     COVID-19,PF,Moderna 06/03/2021     DTAP (<7y) 04/15/1975     Influenza (H1N1) 12/22/2009     Influenza (IIV3) PF 01/28/2013     Influenza Vaccine IM > 6 months Valent IIV4 (Alfuria,Fluzone) 02/12/2022     OPV, trivalent, live 04/15/1975     Pneumococcal 23 valent 06/20/2005     Polio, Unspecified  04/15/1975     TD (ADULT, 7+) 06/20/2005     TDAP Vaccine (Adacel) 04/27/2009           Anticoagulation Information      Recent INR results: No results for input(s): INR in the last 168 hours.  Warfarin doses (if applicable) or name of other anticoagulant: NA      SIGNIFICANT IMAGING FINDINGS     Results for orders placed or performed during the hospital encounter of 04/06/22   Abd/pelvis CT no contrast - Stone Protocol    Impression    IMPRESSION:   1.  The right proximal ureteral stone seen on the prior study is no longer  present. There is mild prominence of the right upper collecting system but no stones or masses are seen in either kidney no left-sided hydronephrosis.  2.  Stable large mature teratoma (dermoid cyst) in the pelvis.  3.  No evidence for bowel obstruction, colitis, appendicitis or diverticulitis.  4.  Borderline splenomegaly.     NM Bone Scan Whole Body    Impression    IMPRESSION:  Lower extremity calciphylaxis.       SIGNIFICANT LABORATORY FINDINGS     Most Recent 3 CBC's:Recent Labs   Lab Test 04/11/22  0638 04/07/22  0754 04/06/22  1107 03/08/22  1336 02/27/22  0646   WBC  --  11.0 9.6  --  5.5   HGB  --  11.5* 13.3 14.6 13.0   MCV  --  81 82  --  87    270 236  --  207     Most Recent 3 BMP's:Recent Labs   Lab Test 04/12/22  0612 04/11/22  0638 04/10/22  0647    142 142   POTASSIUM 3.7 3.8 3.7   CHLORIDE 108* 108* 106   CO2 26 23 22   BUN 39* 50* 77*   CR 1.40* 1.65* 2.05*   ANIONGAP 8 11 14   EVITA 10.2 10.3 10.1    100 106         Discharge Orders        Home Care Referral      Reason for your hospital stay    Abnormal labs     Activity    Your activity upon discharge: activity as tolerated     Follow-up and recommended labs and tests     Follow up with primary care provider, Mikala Luna, within 7 days for hospital follow- up.  The following labs/tests are recommended: BMP.  Follow up with Dr Clark (associated nephrology group) in 2 wks or as planned.     Diet    Follow this diet upon discharge:       Renal Diet (non-dialysis)       Examination   /78 (BP Location: Right arm)   Pulse 104   Temp 98  F (36.7  C) (Oral)   Resp 20   Wt 144.2 kg (317 lb 14.5 oz)   LMP 02/19/2022 (Exact Date)   SpO2 93%   BMI 54.57 kg/m      HEENT: NC/AT                 Eyes-  Pupil round and reactive to light bilaterally                  Neck- supple, no JVP elevation,                  Sclera- anicteric                 Oropharynx- moist and pink  CHEST: Clear to auscultation bilateral anteriorly,  no ronchi or wheezing  HEART: S1S2 normal, regular.   ABDMN: Soft. Non-tender, non-distended.  No guarding or rigidity. Bowel sounds- active  EXTRM: Chronic LE lymphedema. Chronic skin erythema on both sides, left >right.  NEURO: Alert and awake. Cranial nerves II-XII grossly intact. No focal neurological deficit.  Please see EMR for more detailed significant labs, imaging, consultant notes etc.    I, YOLI Connor, personally saw the patient today and spent greater than 30 minutes discharging this patient.    YOLI Connor  [employer]    CC:Mikala Luna

## 2022-04-12 NOTE — PROGRESS NOTES
Pt discharging to home. Discharge instructions given to and reviewed with Pt. Pt verbalizes no questions or concerns. Pt declined to have a follow up appointment made for her.

## 2022-04-12 NOTE — PLAN OF CARE
Problem: UTI (Urinary Tract Infection)  Goal: Improved Infection Symptoms  Outcome: Ongoing, Progressing    Problem: Fluid and Electrolyte Imbalance (Acute Kidney Injury/Impairment)  Goal: Fluid and Electrolyte Balance  Outcome: Ongoing, Progressing     No events overnight.  Brar in place, strict I/O.  Denies pain or discomfort.  Has received miralax and senna.  Denies pain or discomfort.

## 2022-04-13 ENCOUNTER — PATIENT OUTREACH (OUTPATIENT)
Dept: CARE COORDINATION | Facility: CLINIC | Age: 47
End: 2022-04-13
Payer: COMMERCIAL

## 2022-04-13 ENCOUNTER — TELEPHONE (OUTPATIENT)
Dept: CARDIOLOGY | Facility: CLINIC | Age: 47
End: 2022-04-13
Payer: COMMERCIAL

## 2022-04-13 ENCOUNTER — PATIENT OUTREACH (OUTPATIENT)
Dept: CARE COORDINATION | Facility: CLINIC | Age: 47
End: 2022-04-13

## 2022-04-13 ENCOUNTER — MEDICAL CORRESPONDENCE (OUTPATIENT)
Dept: HEALTH INFORMATION MANAGEMENT | Facility: CLINIC | Age: 47
End: 2022-04-13
Payer: COMMERCIAL

## 2022-04-13 DIAGNOSIS — Z71.89 OTHER SPECIFIED COUNSELING: ICD-10-CM

## 2022-04-13 NOTE — PROGRESS NOTES
"Clinic Care Coordination Contact  [facility]: Post-Discharge Note  SITUATION                                                      Admission:    Admission Date: 04/06/22   Reason for Admission: Abnormal labs, fatigue  Discharge:   Discharge Date: 04/12/22  Discharge Diagnosis: Urinary tract infection    BACKGROUND                                                      Per hospital discharge summary and inpatient provider notes:  46 year old female with PMH of hypertension, CHF, nephrolithiasis, PE on xarelto, NARCISA, multiple sclerosis, chronic lymphedema who was referred to our ED by her cardiology clinic for evaluation of abnormal labs    ASSESSMENT      Enrollment  Primary Care Care Coordination Status: Declined    Discharge Assessment  How are you doing now that you are home?: \" I think I'm doing good \"  How are your symptoms? (Red Flag symptoms escalate to triage hotline per guidelines): Improved  Do you feel your condition is stable enough to be safe at home until your provider visit?: Yes  Does the patient have their discharge instructions? : Yes  Does the patient have questions regarding their discharge instructions? : No  Were you started on any new medications or were there changes to any of your previous medications? : Yes  Does the patient have all of their medications?: Yes  Do you have questions regarding any of your medications? : No  Do you have all of your needed medical supplies or equipment (DME)?  (i.e. oxygen tank, CPAP, cane, etc.): Yes  Discharge follow-up appointment scheduled within 14 calendar days? : No  Is patient agreeable to assistance with scheduling? : No    Post-op ([facility] CTA Only)  If the patient had a surgery or procedure, do they have any questions for a nurse?: No           PLAN                                                      Outpatient Plan:     Follow up with primary care provider, Mikala Luna, within 7 days for hospital follow- up.  The following labs/tests are " recommended: BMP.  Follow up with Dr Clark (associated nephrology group) in 2 wks or as planned.          Diet     Follow this diet upon discharge:       Renal Diet (non-dialysis)         Future Appointments   Date Time Provider Department Center   4/15/2022  8:15 AM [facility]ARDIOMEMS [facility]   4/29/2022  1:20 PM Raúl Sanchez MD [facility]   5/11/2022  2:40 PM Mikala Luna MD [facility]   5/13/2022 12:45 PM  HC ECHO STAFF [facility]   5/17/2022  1:30 PM Onur Chaudhry MD [facility]         For any urgent concerns, please contact our 24 hour nurse triage line: [phone] ([phone])         Kristy Calderon MA

## 2022-04-14 ENCOUNTER — E-VISIT (OUTPATIENT)
Dept: FAMILY MEDICINE | Facility: CLINIC | Age: 47
End: 2022-04-14
Payer: COMMERCIAL

## 2022-04-14 ENCOUNTER — TELEPHONE (OUTPATIENT)
Dept: FAMILY MEDICINE | Facility: CLINIC | Age: 47
End: 2022-04-14

## 2022-04-14 DIAGNOSIS — E86.0 DEHYDRATION: Primary | ICD-10-CM

## 2022-04-14 PROCEDURE — 99207 PR NON-BILLABLE SERV PER CHARTING: CPT | Performed by: FAMILY MEDICINE

## 2022-04-14 NOTE — TELEPHONE ENCOUNTER
LM For patient to do HRS Metrics today as she has been discharged from the hospital and back at home.  Alexandra Mukherjee RN BSN, CHFN    Total time 5minutes

## 2022-04-14 NOTE — TELEPHONE ENCOUNTER
Blood pressure okay as long as she's not symptomatic (lightheaded/dizzy).    I see the medication changes.  Med list appears to be up to date.    Mikala Luna M.D.

## 2022-04-14 NOTE — TELEPHONE ENCOUNTER
Reason for Call:  Other    Detailed comments: Resume skilled nursing 3 times a week for wound care, OT, and PT. Home health aid weekly to help with bathing. Pt was supposed to have a follow up for blood tests for tomorrow, wondering if Monday will be okay for this. Would also like a call back from nurse to discuss med list.    Phone Number Patient can be reached at: Other phone number:  [phone]  [facility] [employer] [facility]    Best Time: anytime    Can we leave a detailed message on this number? YES    Call taken on 4/14/2022 at 12:44 PM by Crystal Carolina

## 2022-04-14 NOTE — TELEPHONE ENCOUNTER
Orders called to Hortensia at [facility].    Also would like to inform Dr. Luna that her BP's have been running low  99/68 HR 99 today, Asymptomatic  Bumex and Diflucan discontinued  Started on Augmentin, Sensipar, and Renvela  These changes are on discharge summary from 4/6/22.     Wounds:  Skin tear on her back covering with foam dressing.  Tear on buttocks at crease, putting foam dressing on that also.  Wondering if she should she putting on a barrier cream and Tucks pads?    Marce Wilcox RN on 4/14/2022 at 1:24 PM

## 2022-04-14 NOTE — TELEPHONE ENCOUNTER
RN unable to approve any of the below requested order due to recent change of status with admission to hospital. Routing to Provider to approve and advise of the following    Resume skilled nursing 3x/week - for wound care  OT  PT  Home Health Aid weekly - help with bathing  Monday okay for follow-up blood test okay?  Any changes to med list?    Routing back to nurses to give [employer] a return call after advisement.    Summer Lizarraga RN BSN PHN

## 2022-04-14 NOTE — TELEPHONE ENCOUNTER
Okay for orders.     Monday is okay for blood work if it absolutely can't be done tomorrow.    Unsure what the message is about med list?    Mikala Luna M.D.

## 2022-04-15 ENCOUNTER — TELEPHONE (OUTPATIENT)
Dept: CARDIOLOGY | Facility: CLINIC | Age: 47
End: 2022-04-15

## 2022-04-15 ENCOUNTER — ALLIED HEALTH/NURSE VISIT (OUTPATIENT)
Dept: CARDIOLOGY | Facility: CLINIC | Age: 47
End: 2022-04-15
Payer: COMMERCIAL

## 2022-04-15 DIAGNOSIS — I50.32 CHRONIC DIASTOLIC CONGESTIVE HEART FAILURE (H): Primary | ICD-10-CM

## 2022-04-15 PROCEDURE — 99457 RPM TX MGMT 1ST 20 MIN: CPT | Performed by: INTERNAL MEDICINE

## 2022-04-15 PROCEDURE — 99207 PR NO CHARGE LOS: CPT | Performed by: INTERNAL MEDICINE

## 2022-04-15 PROCEDURE — 99454 REM MNTR PHYSIOL PARAM 16-30: CPT | Performed by: INTERNAL MEDICINE

## 2022-04-15 PROCEDURE — 99458 RPM TX MGMT EA ADDL 20 MIN: CPT | Performed by: INTERNAL MEDICINE

## 2022-04-15 RX ORDER — LEVOFLOXACIN 250 MG/1
250 TABLET, FILM COATED ORAL DAILY
Qty: 3 TABLET | Refills: 0 | Status: SHIPPED | OUTPATIENT
Start: 2022-04-15 | End: 2022-04-18

## 2022-04-15 NOTE — TELEPHONE ENCOUNTER
LM for patient to enter HRS data for today.  Alexandra Mukherjee RN BSN, CHFN      Time spent 5 minutes

## 2022-04-16 LAB
BACTERIA UR CULT: ABNORMAL

## 2022-04-17 NOTE — PROGRESS NOTES
[facility]: [facility] Routine Remote Evaluation    Bess is a patient diagnosed with HFpEF started with HRS Remote Monitoring on 3/16/22.  Bess  has been compliant with daily HRS Monitoring during the period of 3/16/22 through 4/15/22, and is engaged in the management of his/her heart failure. His/her initial intake, education and equipment orientation was completed on 3/16/22 by [facility] staff.    In agreement with the ordering provider, the following parameters were used to monitor Bess. Any metrics outside of these parameters resulted in a call to the patient to assess for heart failure symptoms and notification of the patients provider.     Patient will be enrolled in [employer] technology. Nursing staff will monitor metric input and heart failure symptom questions daily via Clinician Connect Portal. Nursing will notify the heart failure provider if metrics fall outside the following parameters:  Notified for diastolic blood pressure greater than 100 and less than 50  Notified for systolic blood pressure greater than 160 and less than 80  Notified for heart rate greater than 100 and less than 50   Notified for pulse oximeter reading less than 90%  Notified of weight increase of 2 pounds in 1 days   Notified of weight increase of 5 pounds in 7 days   Notified of weight increase of 10 pounds from baseline weight  Patient will be prompted to answer daily symptom questions. Unfavorable questions will be addressed by nursing staff.   1. Have you experienced any increased shortness of breath with daily activity in the past 24 hours?   2. Have you had any increased or new swelling in your ankles, feet, or other body parts in the last 24 hours?   3. Have you experienced any increased tiredness or fatigue in the last 24 hours?      The following alerts/out of range interactions occurred during this monitoring period:  3/18/22 WT ALERT  3/21/22 WT ALERT  3/24/22 WT ALERT  3/28/22  WT ALERT  3/29/22 WT ALERT  3/31/22 WT ALERT  4/1/22 WT ALERT  4/4/22 WT ALERT  4/5/22 WT ALERT  4/14/22 WT ALERT  4/15/22 WT ALERT    Next Visit: 4/29/22 with   Will continue with weekly monitoring with HF provider team.     TOTAL INTERACTION THIS BILLING PERIOD EQUALS  60 MINUTES    Alexandra Mukherjee RN BSN, CHFN    I have reviewedAlexandra Mukherjee RN BSN, CHFN's note and agree.    Raúl Sanchez MD., MHS     CT COMMUN.      READINGS

## 2022-04-18 ENCOUNTER — TELEPHONE (OUTPATIENT)
Dept: FAMILY MEDICINE | Facility: CLINIC | Age: 47
End: 2022-04-18

## 2022-04-18 ENCOUNTER — LAB REQUISITION (OUTPATIENT)
Dept: LAB | Facility: HOSPITAL | Age: 47
End: 2022-04-18
Payer: COMMERCIAL

## 2022-04-18 ENCOUNTER — TELEPHONE (OUTPATIENT)
Dept: CARDIOLOGY | Facility: CLINIC | Age: 47
End: 2022-04-18

## 2022-04-18 DIAGNOSIS — N17.9 ACUTE KIDNEY FAILURE, UNSPECIFIED (H): ICD-10-CM

## 2022-04-18 DIAGNOSIS — I11.0 HYPERTENSIVE HEART DISEASE WITH HEART FAILURE (H): ICD-10-CM

## 2022-04-18 LAB
ANION GAP SERPL CALCULATED.3IONS-SCNC: 11 MMOL/L (ref 5–18)
BUN SERPL-MCNC: 35 MG/DL (ref 8–22)
CALCIUM SERPL-MCNC: 10.2 MG/DL (ref 8.5–10.5)
CHLORIDE BLD-SCNC: 100 MMOL/L (ref 98–107)
CO2 SERPL-SCNC: 23 MMOL/L (ref 22–31)
CREAT SERPL-MCNC: 1.15 MG/DL (ref 0.6–1.1)
GFR SERPL CREATININE-BSD FRML MDRD: 59 ML/MIN/1.73M2
GLUCOSE BLD-MCNC: 90 MG/DL (ref 70–125)
POTASSIUM BLD-SCNC: 3.7 MMOL/L (ref 3.5–5)
SODIUM SERPL-SCNC: 134 MMOL/L (ref 136–145)

## 2022-04-18 PROCEDURE — 36415 COLL VENOUS BLD VENIPUNCTURE: CPT | Mod: ORL | Performed by: FAMILY MEDICINE

## 2022-04-18 PROCEDURE — 80048 BASIC METABOLIC PNL TOTAL CA: CPT | Mod: ORL | Performed by: FAMILY MEDICINE

## 2022-04-18 NOTE — TELEPHONE ENCOUNTER
LM again for Bess to return my call to discuss her current HF status.   Alexandra Mukherjee RN BSN, CHFN    Total time 5 minutes

## 2022-04-18 NOTE — TELEPHONE ENCOUNTER
Reason for Call:  Home Health Care    Jewels with [facility] called regarding (reason for call): Verbal ok for PT delay start of care until 4/19/2022 per pt request    Phone Number Homecare Nurse can be reached at: [phone]    Can we leave a detailed message on this number? YES    Phone number patient can be reached at: Home number on file [phone] (home)    Best Time: any    Call taken on 4/18/2022 at 2:03 PM by Araseli Mo

## 2022-04-18 NOTE — RESULT ENCOUNTER NOTE
Stephanie Kellogg    Your kidney function is improving, follow up with Dr. Luna as planned. Please let us know if you have any questions.     Take care,    JULIETA Batres CNP  Covering for primary/ordering provider:  Dr. Luna

## 2022-04-18 NOTE — TELEPHONE ENCOUNTER
Noted that pt weight is up 4# over the weekend.     Today's weight is 329#.     Called Bess and left VM to please call back clinic to discuss how she is feeling.     Routing to Alexandra ARGUELLES to Follow-up.     HRS time 5 minutes.     Alicia Almonte RN

## 2022-04-19 ENCOUNTER — TELEPHONE (OUTPATIENT)
Dept: CARDIOLOGY | Facility: CLINIC | Age: 47
End: 2022-04-19
Payer: COMMERCIAL

## 2022-04-19 DIAGNOSIS — I50.32 CHRONIC DIASTOLIC CONGESTIVE HEART FAILURE (H): Primary | ICD-10-CM

## 2022-04-19 RX ORDER — BUMETANIDE 2 MG/1
2 TABLET ORAL 2 TIMES DAILY
Qty: 120 TABLET | Refills: 3 | Status: SHIPPED | OUTPATIENT
Start: 2022-04-19 | End: 2022-04-20

## 2022-04-19 NOTE — TELEPHONE ENCOUNTER
Discussed recommendations with patient and she did not want to restart the Bumex since this was stopped by Nephrology (Dr. Clark) [facility] at the time of her hospitalization.   Reached out to [facility] to discuss pt, spoke to Marissa ARGUELLES, who will contact Dr. Clark to let her know our recommendations to restart Bumex 2mg bid and f/u BMP on Monday     Coordinated with [employer] to do labs on Monday (ph. [phone])    Lab orders are faxed to [phone]    Alexandra Mukherjee RN BSN, CHFN    Minutes total: 30

## 2022-04-19 NOTE — TELEPHONE ENCOUNTER
"Noted that pt hasn't transmitted HRS metric yet today. Pt was also contacted yesterday for weight gain of 4# over the weekend and never called back. Most current weight is 329#    Called Bess; she states that she \"blew it\" over the weekend with Easter and ate a lot of sodium. Pt states that she is back on track with sodium restriction and is using [employer].     Reports mild swelling in BLE; denies any SOB, fatigue or difficulty sleeping. Asked that pt send us her HRS metrics today so we can see where her weight is at. Pt states that she won't be able to transmit until later today.     Pt was instructed on discharge from hospital on 4-12-22 to STOP Bumex due to over diuresis and HUNG and only take PRN per PCP.     Pt has hospital Follow-up with PCP on 4-21 and appt with Dr. Sanchez on 4/29 and Nephrology but she couldn't remember when that appt was.     Routing to Key to review and advise and Alexandra ARGUELLES to Follow-up on HRS transmission later today.     HRS time 10 minutes.     Alicia Almonte RN    "

## 2022-04-19 NOTE — TELEPHONE ENCOUNTER
RN called and spoke to Jewels and gave her the approval for the requested order below.     Summer Lizarraga RN BSN PHN

## 2022-04-20 ENCOUNTER — MEDICAL CORRESPONDENCE (OUTPATIENT)
Dept: HEALTH INFORMATION MANAGEMENT | Facility: CLINIC | Age: 47
End: 2022-04-20
Payer: COMMERCIAL

## 2022-04-20 ENCOUNTER — TELEPHONE (OUTPATIENT)
Dept: CARDIOLOGY | Facility: CLINIC | Age: 47
End: 2022-04-20
Payer: COMMERCIAL

## 2022-04-20 RX ORDER — BUMETANIDE 2 MG/1
2 TABLET ORAL DAILY
Qty: 120 TABLET | Refills: 3 | Status: SHIPPED | OUTPATIENT
Start: 2022-04-20 | End: 2022-05-17

## 2022-04-20 NOTE — TELEPHONE ENCOUNTER
Received f/u call from Marissa ARGUELLES from [facility]. She discussed our recommendations with Dr. Clark. She prefers patient restart Bumex at 2mg daily. Then to report any progressive HF symptoms to [facility] at which time dosing may be increased. Labs in F/U next week 4/26 when pt seen by  in office.   Patient was notified by Marissa ARGUELLES of this plan.    Since pt was not scheduled for her f/u appt with Nephrology at the time of her discharge this was accomplished by Marissa ARGUELLES of [facility].    Bess will see Dr. Sanchez 4/29/22 in clinic f/u to assess in person effectiveness of the above plan.    Bess was advised that we are monitoring her kidney function closely. Her last Creat 1.15  On 4/18/22.    Reinforced that the diuretic therapy does impact her kidney function and that often times, over time her Creat may not always return to normal range as her HF advances, this is a natural progression. A delicate balance of treatment and preservation of heart and kidney function.  She verbalized understanding of this plan.    Alexandra Mukherjee RN BSN, CHFN

## 2022-04-20 NOTE — TELEPHONE ENCOUNTER
Called pt and left VM to please transmit HRS today.     Routing to [employer] to Follow-up.     HRS time 5 minutes.     Alicia Almonte RN

## 2022-04-21 ENCOUNTER — OFFICE VISIT (OUTPATIENT)
Dept: FAMILY MEDICINE | Facility: CLINIC | Age: 47
End: 2022-04-21
Payer: COMMERCIAL

## 2022-04-21 ENCOUNTER — TELEPHONE (OUTPATIENT)
Dept: FAMILY MEDICINE | Facility: CLINIC | Age: 47
End: 2022-04-21

## 2022-04-21 ENCOUNTER — MEDICAL CORRESPONDENCE (OUTPATIENT)
Dept: HEALTH INFORMATION MANAGEMENT | Facility: CLINIC | Age: 47
End: 2022-04-21

## 2022-04-21 ENCOUNTER — TELEPHONE (OUTPATIENT)
Dept: CARDIOLOGY | Facility: CLINIC | Age: 47
End: 2022-04-21

## 2022-04-21 VITALS
TEMPERATURE: 98.2 F | HEIGHT: 64 IN | OXYGEN SATURATION: 99 % | RESPIRATION RATE: 17 BRPM | SYSTOLIC BLOOD PRESSURE: 126 MMHG | HEART RATE: 90 BPM | DIASTOLIC BLOOD PRESSURE: 72 MMHG | BODY MASS INDEX: 50.02 KG/M2 | WEIGHT: 293 LBS

## 2022-04-21 DIAGNOSIS — I89.0 SECONDARY LYMPHEDEMA: Primary | ICD-10-CM

## 2022-04-21 DIAGNOSIS — G35 MULTIPLE SCLEROSIS (H): Chronic | ICD-10-CM

## 2022-04-21 DIAGNOSIS — E66.01 MORBID OBESITY (H): Chronic | ICD-10-CM

## 2022-04-21 DIAGNOSIS — I50.32 CHRONIC DIASTOLIC CONGESTIVE HEART FAILURE (H): ICD-10-CM

## 2022-04-21 DIAGNOSIS — I50.32 CHRONIC HEART FAILURE WITH PRESERVED EJECTION FRACTION (H): ICD-10-CM

## 2022-04-21 DIAGNOSIS — F41.1 GENERALIZED ANXIETY DISORDER: ICD-10-CM

## 2022-04-21 DIAGNOSIS — E83.59 CALCIPHYLAXIS: ICD-10-CM

## 2022-04-21 DIAGNOSIS — Z86.711 HISTORY OF PULMONARY EMBOLISM: ICD-10-CM

## 2022-04-21 DIAGNOSIS — Z13.6 CARDIOVASCULAR SCREENING; LDL GOAL LESS THAN 160: ICD-10-CM

## 2022-04-21 DIAGNOSIS — F33.1 MODERATE EPISODE OF RECURRENT MAJOR DEPRESSIVE DISORDER (H): ICD-10-CM

## 2022-04-21 DIAGNOSIS — I89.0 LYMPHEDEMA OF BOTH LOWER EXTREMITIES: ICD-10-CM

## 2022-04-21 DIAGNOSIS — N17.9 ACUTE RENAL FAILURE, UNSPECIFIED ACUTE RENAL FAILURE TYPE (H): Primary | ICD-10-CM

## 2022-04-21 PROCEDURE — 99495 TRANSJ CARE MGMT MOD F2F 14D: CPT | Performed by: FAMILY MEDICINE

## 2022-04-21 RX ORDER — SERTRALINE HYDROCHLORIDE 100 MG/1
100 TABLET, FILM COATED ORAL DAILY
Qty: 90 TABLET | Refills: 3 | Status: SHIPPED | OUTPATIENT
Start: 2022-04-21 | End: 2023-09-27

## 2022-04-21 ASSESSMENT — PAIN SCALES - GENERAL: PAINLEVEL: NO PAIN (0)

## 2022-04-21 NOTE — TELEPHONE ENCOUNTER
[facility] in [place] @ Fax# [phone]    Please fax DME order.    Put in [facility] inbox    Mikala Luna M.D.

## 2022-04-21 NOTE — TELEPHONE ENCOUNTER
Bess has wt up 2# today from yesterday, and 3# up from a week ago. She tends to fluctuate in wt with some frequency, no new recommendations at this time. Reviewed with DR. Daniel Mukherjee RN BSN, CHFN     total time 5 minutes

## 2022-04-21 NOTE — TELEPHONE ENCOUNTER
Called pt and left VM to please transmit HRS metrics for today.     Routing to Alexandra ARGUELLES to Follow-up.     Alicia Almonte RN

## 2022-04-21 NOTE — PROGRESS NOTES
Assessment & Plan     Acute renal failure, unspecified acute renal failure type (H)  Renal function continues to improve - labs drawn on 4/18 and scheduled again for early next week    Multiple sclerosis (H)  Follow up with neurology    Morbid obesity (H)  Has lost 75 lbs in the past several months, ongoing weight loss is encouraged  Patient feels this has allowed for more mobility, etc    Chronic diastolic congestive heart failure (H)  Is doing daily weights with Soraa, cardiology is informed  Just added back bumex which helped with recent water weight gain    Chronic heart failure with preserved ejection fraction (H)  Has follow up with cardiology next week    Lymphedema of both lower extremities   lymphedema/OT is seeing her at home.  They are considering a machine to help with compression.  Wraps don't work well, particularly on her thighs as the wraps just seem to fall down    History of pulmonary embolism  On xarelto since the PE was discovered in early February.  Had COVID.  Certainly other risk factors (oral contraceptive pill, immobility, obesity)  -was told 3 months of anticoagulant    Calciphylaxis  NM3 phase bone scan on 4/8 showed numerous contiguous irregularly shaped areas of radiotracer activity within th soft tissues of both legs including medial aspects of both thighs as well as both calves, left greater than right. Findings consistent with calciphylaxis. Patient to follow up with nephrology in the clinic. Neprology to re-assess and refer for skin biopsy if needed.   - referral done    - Adult Dermatology Referral; Future    CARDIOVASCULAR SCREENING; LDL GOAL LESS THAN 160       Moderate episode of recurrent major depressive disorder (H)   refilled, stable  - sertraline (ZOLOFT) 100 MG tablet; Take 1 tablet (100 mg) by mouth daily    Generalized anxiety disorder  stable  - sertraline (ZOLOFT) 100 MG tablet; Take 1 tablet (100 mg) by mouth daily             BMI:   Estimated body mass index is  "56.04 kg/m  as calculated from the following:    Height as of this encounter: 1.626 m (5' 4\").    Weight as of this encounter: 148.1 kg (326 lb 8 oz).           No follow-ups on file.    Mikala Luna MD  [facility]    Cornell Kellogg is a 46 year old who presents for the following health issues;     HPI     Post Discharge Outreach 4/13/2022   Admission Date 4/6/2022   Reason for Admission Abnormal labs, fatigue   Discharge Date 4/12/2022   Discharge Diagnosis Urinary tract infection   How are you doing now that you are home? \" I think I'm doing good \"   How are your symptoms? (Red Flag symptoms escalate to triage hotline per guidelines) Improved   Do you feel your condition is stable enough to be safe at home until your provider visit? Yes   Does the patient have their discharge instructions?  Yes   Does the patient have questions regarding their discharge instructions?  No   Were you started on any new medications or were there changes to any of your previous medications?  Yes   Does the patient have all of their medications? Yes   Do you have questions regarding any of your medications?  No   Do you have all of your needed medical supplies or equipment (DME)?  (i.e. oxygen tank, CPAP, cane, etc.) Yes   Discharge follow-up appointment scheduled within 14 calendar days?  No     Hospital Follow-up Visit:    Hospital/Nursing Home/IP Rehab Facility: [facility] [place]  Date of Admission: 4/6/2022  Date of Discharge: 4/12/2022   Reason(s) for Admission: Urinary tract infection, Splenomegaly, Hyponatremia, Renal insufficiency       Was your hospitalization related to COVID-19? No   Problems taking medications regularly:  None  Medication changes since discharge: lisinopril discharge.   Problems adhering to non-medication therapy:  None    Summary of hospitalization:  [facility] discharge summary reviewed  Diagnostic Tests/Treatments reviewed.  Follow up " needed: Nephrology and Cardiology.   ?dermatology for the question of calcinosis cutis  Other Healthcare Providers Involved in Patient s Care:         Homecare - [facility]  Update since discharge: improved.       Post Discharge Medication Reconciliation: discharge medications reconciled, continue medications without change.  Plan of care communicated with patient                 SUMMARY OF HOSPITAL COURSE:       46 year old female with PMH of hypertension, CHF, nephrolithiasis, PE on xarelto, NARCISA, multiple sclerosis, chronic lymphedema who was referred to our ED by her cardiology clinic for evaluation of abnormal labs.      Acute renal failure:  High AG metabolic acidosis:  -- Presented with creatinine of 6.6. It was normal in Feb 2022.  Likely pre-renal due to overdiuresis. Bumex has recently been increased per patient. Hypotension might have contributed to this as the patient reports low BP at home for the last couple of wks. Total CK normal  -- Improving. Creatinine 6.6-->5.7->4.4-->2.7--> 2.0-->1.6-- 1.4. CT shows no hydronephrosis  -- Holding bumex and ACEI. PCP to resume bumex prn base on volume status  -- Nephrology consulted. Appreciate input. Started on Renvela and Sensipar per nephrology. Follow up in the nephrology clinic as planned.      Hypotension:  -- Lactic acid is normal. Holding metoprolol  -- Given NS bolus 500ml x 2 on 4/7. Started on Midodrine. Patient reports no lightheadedness. No chest pain or shortness of breath  -- Cont with IVF.   -- I donot suspect PE as the patient had been on Xarelto, and has not been hypoxic  -- Checked cortisol level and TSH- normal. Blood cultures- NTD     Hyponatremia:  -- Sodium of 127 noted. Suspect due to intravascular vol depletion from overdiuresis  -- Improved with IVF.     UTI:  -- Abnormal UA noted  -- Treated with IV rocephin awaiting culture report/ U/C growing GNB and GPC. Final culture report is pending. Discharging home on 4 more days of PO  "augmentin     Diarrhea:  -- No diarrhea since admission  -- Likely viral GE.      H/O PE:  -- Was holding xarelto due to low GFR. As GFR improved, started back on Xarelto from 4/11     HFpEF:  -- BNP is elevated in the context of renal failure. I dont believe she is in acute CHF  -- Holding diuretics for now as mitesh.     NARCISA:  -- CPAP at night     Lymphedema:  Skin lesions:  -- Consult lymphedema PT/OT  -- NM3 phase bone scan on 4/8 showed numerous contiguous irregularly shaped areas of radiotracer activity within th soft tissues of both legs including medial aspects of both thighs as well as both calves, left greater than right. Findings consistent with calciphylaxis. Patient to follow up with nephrology in the clinic. Neprology to re-assess and refer for skin biopsy if needed.        Review of Systems   Constitutional, HEENT, cardiovascular, pulmonary, gi and gu systems are negative, except as otherwise noted.      Objective    /72   Pulse 90   Temp 98.2  F (36.8  C) (Tympanic)   Resp 17   Ht 1.626 m (5' 4\")   Wt 148.1 kg (326 lb 8 oz)   LMP 04/03/2022   SpO2 99%   Breastfeeding No   BMI 56.04 kg/m    Body mass index is 56.04 kg/m .  Physical Exam   GENERAL: healthy, alert and no distress  NECK: no adenopathy, no asymmetry, masses, or scars and thyroid normal to palpation  RESP: lungs clear to auscultation - no rales, rhonchi or wheezes  CV: regular rate and rhythm, normal S1 S2, no S3 or S4, no murmur, click or rub, no peripheral edema and peripheral pulses strong  ABDOMEN: soft, nontender, no hepatosplenomegaly, no masses and bowel sounds normal  MS: marked edema of both lower legs.  Distal medial thighs are hard/edematous    Labs reviewed            "

## 2022-04-21 NOTE — TELEPHONE ENCOUNTER
Reason for Call: Request for an order or referral:    Order or referral being requested: JIMENA Yu with [facility] calling.  Pt needs Rx for new compression garments sent to [facility] in [place] @ Fax# [phone]. Rx/DME Order must state:  4 pair of Cool Flex Wraps for bilat lower extremeties for diagnosis of Lymphedema.    She also needs verbal order for:  Lymphedema treatemnet from OT 1x a week x 1 week and then 2x a week x 3 weeks and then 1x a week x 1 week.  Please call Aimee with verbal orders      Date needed: at your convenience    Has the patient been seen by the PCP for this problem? NO    Additional comments:     Call taken on 4/21/2022 at 3:34 PM by Jewels Stevens

## 2022-04-21 NOTE — TELEPHONE ENCOUNTER
Routing pended DME order for Providers approval.    RN called and left a message on Aimee's confidential VM line with the approval of the below requested orders. Per the Home Care, Assisted Living or Nursing Home Evaluations and treatments (Including After Hours)-[facility] and [facility] [employer] this RN was able to give the approval orders.     Summer Lizarraga RN BSN PHN

## 2022-04-22 ENCOUNTER — TELEPHONE (OUTPATIENT)
Dept: FAMILY MEDICINE | Facility: CLINIC | Age: 47
End: 2022-04-22
Payer: COMMERCIAL

## 2022-04-22 NOTE — TELEPHONE ENCOUNTER
Jewels PT with  [facility] is calling for PT order to continue home physical therapy 1x week for 2 weeks for strengthening and to establish home routine. [facility]bal [facility].  Christie Farah RN

## 2022-04-26 ENCOUNTER — TELEPHONE (OUTPATIENT)
Dept: FAMILY MEDICINE | Facility: CLINIC | Age: 47
End: 2022-04-26
Payer: COMMERCIAL

## 2022-04-26 NOTE — TELEPHONE ENCOUNTER
Salvatore from [facility] called, pt was referred for lymphedema pump, he needs doctor's notes that contain lymphedema dx  faxed to  [phone], they will send paperwork for Dr Luna to sign after insurance send them the required forms. Requested information faxed. Christie Farah RN

## 2022-04-27 ENCOUNTER — MEDICAL CORRESPONDENCE (OUTPATIENT)
Dept: HEALTH INFORMATION MANAGEMENT | Facility: CLINIC | Age: 47
End: 2022-04-27
Payer: COMMERCIAL

## 2022-04-27 ENCOUNTER — TELEPHONE (OUTPATIENT)
Dept: FAMILY MEDICINE | Facility: CLINIC | Age: 47
End: 2022-04-27
Payer: COMMERCIAL

## 2022-04-27 ENCOUNTER — MYC MEDICAL ADVICE (OUTPATIENT)
Dept: FAMILY MEDICINE | Facility: CLINIC | Age: 47
End: 2022-04-27
Payer: COMMERCIAL

## 2022-04-27 NOTE — TELEPHONE ENCOUNTER
Dr. Luna can you review her recent labs that have been sent over from [facility] 4//25/22.    Summer Lizarraga RN BSN PHN

## 2022-04-27 NOTE — TELEPHONE ENCOUNTER
Reason for Call: Request for an order or referral:    Order or referral being requested: Forms for PA for Pneumatic Compression Device placed on MD's desk for completion for [facility]    Date needed: at your convenience    Has the patient been seen by the PCP for this problem? NO    Additional comments:     Call taken on 4/27/2022 at 8:24 AM by Jewels Stevens

## 2022-04-27 NOTE — TELEPHONE ENCOUNTER
Sent her result message below in a message to Bess. It does appear that she had reviewed this yesterday though.    Hello Bess     Your kidney function is improving, follow up with Dr. Luna as planned. Please let us know if you have any questions.      Take care,     JULIETA Batres CNP  Covering for primary/ordering provider:  Dr. Luna

## 2022-04-28 ENCOUNTER — MEDICAL CORRESPONDENCE (OUTPATIENT)
Dept: HEALTH INFORMATION MANAGEMENT | Facility: CLINIC | Age: 47
End: 2022-04-28
Payer: COMMERCIAL

## 2022-04-29 ENCOUNTER — OFFICE VISIT (OUTPATIENT)
Dept: CARDIOLOGY | Facility: CLINIC | Age: 47
End: 2022-04-29
Payer: COMMERCIAL

## 2022-04-29 VITALS
BODY MASS INDEX: 56.04 KG/M2 | DIASTOLIC BLOOD PRESSURE: 68 MMHG | SYSTOLIC BLOOD PRESSURE: 98 MMHG | HEIGHT: 64 IN | RESPIRATION RATE: 16 BRPM | HEART RATE: 108 BPM

## 2022-04-29 DIAGNOSIS — I50.33 ACUTE ON CHRONIC DIASTOLIC CONGESTIVE HEART FAILURE (H): Primary | ICD-10-CM

## 2022-04-29 PROCEDURE — 99214 OFFICE O/P EST MOD 30 MIN: CPT | Performed by: INTERNAL MEDICINE

## 2022-04-29 NOTE — LETTER
4/29/2022    Mikala Luna MD  [address]  [facility] [address]    RE: Bess Enamorado       Dear Colleague,     I had the pleasure of seeing Bess Enamorado in the [facility].    HEART CARE NOTE          Assessment/Recommendations     1. HFpEF/RV dysfunction  Assessment / Plan    Previous Echo significant for RV dysfunction in the setting of PE - repeat echo ordered; may determine the need for VQ scan to assess for chromic PEs    Tolerating oral diuretic; denies orthopnea, PND, fluid retention; near euvolemia on physical exam - no changes to regimen at this time; weight steadily trending down on HRS    2. CKD  Assessment / Plan    [facility] with [facility]    Renal function stable on recent check; crt down to baseline    3. HTN  Assessment / Plan    Borderline; patient denies lightheadedness, dizziness; reports this to be her baseline- no changes at this time    4. NARCISA  Assessment / Plan    CPAP at bedtime    5. PE  Assessment / Plan    Continue on rivaroxaban    Follow with pulmonary in 5/22    History of Present Illness/Subjective    Ms. Bess Enamorado is a 46 year old female with a PMHx significant for PMH of hypertension, CHF, nephrolithiasis, PE on xarelto, NARCISA, multiple sclerosis, chronic lymphedema who presents to [facility] for follow-up care.    Today, Bess reports steady improvement since his recent hospitalization for for [facility] in the setting of diarrhea and UTI; she denies any acute cardiac events or complaints; Management plan as detailed above.     ECG: Personally reviewed. sinus tachycardia.    ECHO (personnaly Reviewed):   Interpretation Summary     1.Left ventricular size, wall motion and function are normal. The ejection  fraction is > 65%.  2.Moderately decreased right ventricular systolic function  3.No hemodynamically significant valvular abnormalities on 2D or color flow  imaging.  4.Technically difficult, suboptimal study due to patient size.  There is no  "comparison study available.        Physical Examination Review of Systems   BP 98/68 (BP Location: Right arm, Patient Position: Sitting, Cuff Size: Adult Large)   Pulse 108   Resp 16   Ht 1.626 m (5' 4\")   LMP 04/03/2022   BMI 56.04 kg/m    Body mass index is 56.04 kg/m .  Wt Readings from Last 3 Encounters:   04/21/22 148.1 kg (326 lb 8 oz)   04/10/22 144.2 kg (317 lb 14.5 oz)   04/05/22 (!) 151.5 kg (334 lb)     General Appearance:   no distress, normal body habitus   ENT/Mouth: membranes moist, no oral lesions or bleeding gums.      EYES:  no scleral icterus, normal conjunctivae   Neck: no carotid bruits or thyromegaly   Chest/Lungs:   lungs are clear to auscultation, no rales or wheezing, equal chest wall expansion    Cardiovascular:   Regular. Normal first and second heart sounds with no murmurs, rubs, or gallops; the carotid, radial and posterior tibial pulses are intact, no JVD; b/l lymphedema    Abdomen:  no organomegaly, masses, bruits, or tenderness; bowel sounds are present   Extremities: no cyanosis or clubbing   Skin: no xanthelasma, warm.    Neurologic: alert and oriented x3, calm     Psychiatric: alert and oriented x3, calm     A complete 10 systems ROS was reviewed  And is negative except what is listed in the HPI.          Medical History  Surgical History Family History Social History   Past Medical History:   Diagnosis Date     Acute on chronic diastolic congestive heart failure (H) 2/9/2022     Arthritis 2019    Hips     ASCUS favor benign 8/2015    Neg HPV     Depressive disorder 2010     H/O colposcopy with cervical biopsy 9/14/15    Bx & ECC - negative     Hypertension 2019     LSIL on Pap smear 7/2014    Neg high risk HPV     Multiple sclerosis (H) 8/29/2005 9/18/200pt dx with MS 2004--dx by neurolgist and is followed by Dr. Steven Ayala 10/2016     UNSPEC CONSTIPATION 9/18/2006 9/18/2006   Has tried otc suppository and otc lax.     Past Surgical History:   Procedure " Laterality Date     CHOLECYSTECTOMY, LAPOROSCOPIC      Cholecystectomy, Laparoscopic     COLONOSCOPY  2007?    Bathroom issue..results normal     COMBINED CYSTOSCOPY, RETROGRADES, EXCHANGE STENT URETER(S) Right 2/21/2022    Procedure: CYSTOSCOPY, WITH right RETROGRADE PYELOGRAM AND right URETERAL STENT PLACEMENT;  Surgeon: Doyle Palomares MD;  Location: [facility] OR     OPEN REDUCTION INTERNAL FIXATION TOE(S)  4/13/2012    Procedure:OPEN REDUCTION INTERNAL FIXATION TOE(S); Open reduction internal fixation right proximal fifth metatarsal fracture-   Anes-choice block; Surgeon:LEY, JEFFREY DUANE; Location:[facility] OR     PICC TRIPLE LUMEN PLACEMENT  2/21/2022         no family history of premature coronary artery disease Social History     Socioeconomic History     Marital status: Single     Spouse name: Not on file     Number of children: Not on file     Years of education: Not on file     Highest education level: Not on file   Occupational History     Employer: [employer]   Tobacco Use     Smoking status: Never Smoker     Smokeless tobacco: Never Used   Vaping Use     Vaping Use: Never used   Substance and Sexual Activity     Alcohol use: Yes     Comment: social     Drug use: No     Sexual activity: Not Currently     Partners: Male     Birth control/protection: Pill   Other Topics Concern     Parent/sibling w/ CABG, MI or angioplasty before 65F 55M? No   Social History Narrative    , 3rd-5th grade     Social Determinants of Health     Financial Resource Strain: Low Risk      Difficulty of Paying Living Expenses: Not very hard   Food Insecurity: No Food Insecurity     Worried About Running Out of Food in the Last Year: Never true     Ran Out of Food in the Last Year: Never true   Transportation Needs: No Transportation Needs     Lack of Transportation (Medical): No     Lack of Transportation (Non-Medical): No   Physical Activity: Inactive     Days of Exercise per Week: 0 days     Minutes  of Exercise per Session: 0 min   Stress: Not on file   Social Connections: Unknown     Frequency of Communication with Friends and Family: Twice a week     Frequency of Social Gatherings with Friends and Family: Twice a week     Attends Mandaeism Services: Not on file     Active Member of Clubs or Organizations: Not on file     Attends Club or Organization Meetings: Not on file     Marital Status: Not on file   Intimate Partner Violence: Not At Risk     Fear of Current or Ex-Partner: No     Emotionally Abused: No     Physically Abused: No     Sexually Abused: No   Housing Stability: Not on file           Lab Results    Chemistry/lipid CBC Cardiac Enzymes/BNP/TSH/INR   Lab Results   Component Value Date    CHOL 162 02/11/2016    HDL 64 02/11/2016    TRIG 113 02/11/2016    BUN 35 (H) 04/18/2022     (L) 04/18/2022    CO2 23 04/18/2022    Lab Results   Component Value Date    WBC 11.0 04/07/2022    HGB 11.5 (L) 04/07/2022    HCT 34.7 (L) 04/07/2022    MCV 81 04/07/2022     04/11/2022    Lab Results   Component Value Date    TROPONINI 0.03 04/06/2022     (H) 04/06/2022    TSH 1.52 04/07/2022    INR 1.23 (H) 02/11/2022     Lab Results   Component Value Date    TROPONINI 0.03 04/06/2022          Weight:    Wt Readings from Last 3 Encounters:   04/21/22 148.1 kg (326 lb 8 oz)   04/10/22 144.2 kg (317 lb 14.5 oz)   04/05/22 (!) 151.5 kg (334 lb)       Allergies  Allergies   Allergen Reactions     Nkda [No Known Drug Allergies]          Surgical History  Past Surgical History:   Procedure Laterality Date     CHOLECYSTECTOMY, LAPOROSCOPIC      Cholecystectomy, Laparoscopic     COLONOSCOPY  2007?    Bathroom issue..results normal     COMBINED CYSTOSCOPY, RETROGRADES, EXCHANGE STENT URETER(S) Right 2/21/2022    Procedure: CYSTOSCOPY, WITH right RETROGRADE PYELOGRAM AND right URETERAL STENT PLACEMENT;  Surgeon: Doyle Palomares MD;  Location: [facility]     OPEN REDUCTION INTERNAL FIXATION TOE(S)   4/13/2012    Procedure:OPEN REDUCTION INTERNAL FIXATION TOE(S); Open reduction internal fixation right proximal fifth metatarsal fracture-   Anes-choice block; Surgeon:LEY, JEFFREY DUANE; Location:[facility] OR     PICC TRIPLE LUMEN PLACEMENT  2/21/2022            Social History  Tobacco:   History   Smoking Status     Never Smoker   Smokeless Tobacco     Never Used    Alcohol:   Social History    Substance and Sexual Activity      Alcohol use: Yes        Comment: social   Illicit Drugs:   History   Drug Use No       Family History  Family History   Problem Relation Age of Onset     Neurologic Disorder Father         MS     Heart Disease Father         irregular heart rate     Diabetes Father      Melanoma Father      Sleep Apnea Father      Other Cancer Father      Cancer Maternal Grandfather         lung     Other Cancer Maternal Grandfather      Cancer Paternal Grandmother         stomach     Other Cancer Paternal Grandmother      Cancer Mother      Other Cancer Mother      Thyroid Disease Mother      Gynecology Maternal Aunt         PCOS     Hypertension Maternal Grandmother      Anxiety Disorder Maternal Grandmother      Thyroid Disease Maternal Grandmother      Anxiety Disorder Sister           Raúl Sanchez MD on 4/29/2022      cc: Mikala Luna    Thank you for allowing me to participate in the care of your patient.      Sincerely,     Raúl Sanchez MD     [facility]  cc:   No referring provider defined for this encounter.

## 2022-04-29 NOTE — PROGRESS NOTES
"  HEART CARE NOTE          Assessment/Recommendations     1. HFpEF/RV dysfunction  Assessment / Plan  Previous Echo significant for RV dysfunction in the setting of PE - repeat echo ordered; may determine the need for VQ scan to assess for chromic PEs  Tolerating oral diuretic; denies orthopnea, PND, fluid retention; near euvolemia on physical exam - no changes to regimen at this time; weight steadily trending down on HRS    2. CKD  Assessment / Plan  [facility] with [facility]  Renal function stable on recent check; crt down to baseline    3. HTN  Assessment / Plan  Borderline; patient denies lightheadedness, dizziness; reports this to be her baseline- no changes at this time    4. NARCISA  Assessment / Plan  CPAP at bedtime    5. PE  Assessment / Plan  Continue on rivaroxaban  Follow with pulmonary in 5/22    History of Present Illness/Subjective    Ms. Bess Enamorado is a 46 year old female with a PMHx significant for PMH of hypertension, CHF, nephrolithiasis, PE on xarelto, NARCISA, multiple sclerosis, chronic lymphedema who presents [facility] for follow-up care.    Today, Bess reports steady improvement since his recent hospitalization for for [facility] in the setting of diarrhea and UTI; she denies any acute cardiac events or complaints; Management plan as detailed above.     ECG: Personally reviewed. sinus tachycardia.    ECHO (personnaly Reviewed):   Interpretation Summary     1.Left ventricular size, wall motion and function are normal. The ejection  fraction is > 65%.  2.Moderately decreased right ventricular systolic function  3.No hemodynamically significant valvular abnormalities on 2D or color flow  imaging.  4.Technically difficult, suboptimal study due to patient size.  There is no comparison study available.        Physical Examination Review of Systems   BP 98/68 (BP Location: Right arm, Patient Position: Sitting, Cuff Size: Adult Large)   Pulse 108   Resp 16   Ht 1.626 m (5' 4\")   LMP 04/03/2022  "  BMI 56.04 kg/m    Body mass index is 56.04 kg/m .  Wt Readings from Last 3 Encounters:   04/21/22 148.1 kg (326 lb 8 oz)   04/10/22 144.2 kg (317 lb 14.5 oz)   04/05/22 (!) 151.5 kg (334 lb)     General Appearance:   no distress, normal body habitus   ENT/Mouth: membranes moist, no oral lesions or bleeding gums.      EYES:  no scleral icterus, normal conjunctivae   Neck: no carotid bruits or thyromegaly   Chest/Lungs:   lungs are clear to auscultation, no rales or wheezing, equal chest wall expansion    Cardiovascular:   Regular. Normal first and second heart sounds with no murmurs, rubs, or gallops; the carotid, radial and posterior tibial pulses are intact, no JVD; b/l lymphedema    Abdomen:  no organomegaly, masses, bruits, or tenderness; bowel sounds are present   Extremities: no cyanosis or clubbing   Skin: no xanthelasma, warm.    Neurologic: alert and oriented x3, calm     Psychiatric: alert and oriented x3, calm     A complete 10 systems ROS was reviewed  And is negative except what is listed in the HPI.          Medical History  Surgical History Family History Social History   Past Medical History:   Diagnosis Date     Acute on chronic diastolic congestive heart failure (H) 2/9/2022     Arthritis 2019    Hips     ASCUS favor benign 8/2015    Neg HPV     Depressive disorder 2010     H/O colposcopy with cervical biopsy 9/14/15    Bx & ECC - negative     Hypertension 2019     LSIL on Pap smear 7/2014    Neg high risk HPV     Multiple sclerosis (H) 8/29/2005 9/18/200pt dx with MS 2004--dx by neurolgist and is followed by Dr. Steven Ayala 10/2016     UNSPEC CONSTIPATION 9/18/2006 9/18/2006   Has tried otc suppository and otc lax.     Past Surgical History:   Procedure Laterality Date     CHOLECYSTECTOMY, LAPOROSCOPIC      Cholecystectomy, Laparoscopic     COLONOSCOPY  2007?    Bathroom issue..results normal     COMBINED CYSTOSCOPY, RETROGRADES, EXCHANGE STENT URETER(S) Right 2/21/2022     Procedure: CYSTOSCOPY, WITH right RETROGRADE PYELOGRAM AND right URETERAL STENT PLACEMENT;  Surgeon: Doyle Palomares MD;  Location: [facility] [place] [facility]     OPEN REDUCTION INTERNAL FIXATION TOE(S)  4/13/2012    Procedure:OPEN REDUCTION INTERNAL FIXATION TOE(S); Open reduction internal fixation right proximal fifth metatarsal fracture-   Anes-choice block; Surgeon:LEY, JEFFREY DUANE; Location:[place] OR     PICC TRIPLE LUMEN PLACEMENT  2/21/2022         no family history of premature coronary artery disease Social History     Socioeconomic History     Marital status: Single     Spouse name: Not on file     Number of children: Not on file     Years of education: Not on file     Highest education level: Not on file   Occupational History     Employer: [employer]   Tobacco Use     Smoking status: Never Smoker     Smokeless tobacco: Never Used   Vaping Use     Vaping Use: Never used   Substance and Sexual Activity     Alcohol use: Yes     Comment: social     Drug use: No     Sexual activity: Not Currently     Partners: Male     Birth control/protection: Pill   Other Topics Concern     Parent/sibling w/ CABG, MI or angioplasty before 65F 55M? No   Social History Narrative    , 3rd-5th grade     Social Determinants of Health     Financial Resource Strain: Low Risk      Difficulty of Paying Living Expenses: Not very hard   Food Insecurity: No Food Insecurity     Worried About Running Out of Food in the Last Year: Never true     Ran Out of Food in the Last Year: Never true   Transportation Needs: No Transportation Needs     Lack of Transportation (Medical): No     Lack of Transportation (Non-Medical): No   Physical Activity: Inactive     Days of Exercise per Week: 0 days     Minutes of Exercise per Session: 0 min   Stress: Not on file   Social Connections: Unknown     Frequency of Communication with Friends and Family: Twice a week     Frequency of Social Gatherings with Friends and Family: Twice a week      Attends Christianity Services: Not on file     Active Member of Clubs or Organizations: Not on file     Attends Club or Organization Meetings: Not on file     Marital Status: Not on file   Intimate Partner Violence: Not At Risk     Fear of Current or Ex-Partner: No     Emotionally Abused: No     Physically Abused: No     Sexually Abused: No   Housing Stability: Not on file           Lab Results    Chemistry/lipid CBC Cardiac Enzymes/BNP/TSH/INR   Lab Results   Component Value Date    CHOL 162 02/11/2016    HDL 64 02/11/2016    TRIG 113 02/11/2016    BUN 35 (H) 04/18/2022     (L) 04/18/2022    CO2 23 04/18/2022    Lab Results   Component Value Date    WBC 11.0 04/07/2022    HGB 11.5 (L) 04/07/2022    HCT 34.7 (L) 04/07/2022    MCV 81 04/07/2022     04/11/2022    Lab Results   Component Value Date    TROPONINI 0.03 04/06/2022     (H) 04/06/2022    TSH 1.52 04/07/2022    INR 1.23 (H) 02/11/2022     Lab Results   Component Value Date    TROPONINI 0.03 04/06/2022          Weight:    Wt Readings from Last 3 Encounters:   04/21/22 148.1 kg (326 lb 8 oz)   04/10/22 144.2 kg (317 lb 14.5 oz)   04/05/22 (!) 151.5 kg (334 lb)       Allergies  Allergies   Allergen Reactions     Nkda [No Known Drug Allergies]          Surgical History  Past Surgical History:   Procedure Laterality Date     CHOLECYSTECTOMY, LAPOROSCOPIC      Cholecystectomy, Laparoscopic     COLONOSCOPY  2007?    Bathroom issue..results normal     COMBINED CYSTOSCOPY, RETROGRADES, EXCHANGE STENT URETER(S) Right 2/21/2022    Procedure: CYSTOSCOPY, WITH right RETROGRADE PYELOGRAM AND right URETERAL STENT PLACEMENT;  Surgeon: Doyle Palomares MD;  Location: [facility] OR     OPEN REDUCTION INTERNAL FIXATION TOE(S)  4/13/2012    Procedure:OPEN REDUCTION INTERNAL FIXATION TOE(S); Open reduction internal fixation right proximal fifth metatarsal fracture-   Anes-choice block; Surgeon:LEY, JEFFREY DUANE; Location:[place] OR     PICC TRIPLE LUMEN  PLACEMENT  2/21/2022            Social History  Tobacco:   History   Smoking Status     Never Smoker   Smokeless Tobacco     Never Used    Alcohol:   Social History    Substance and Sexual Activity      Alcohol use: Yes        Comment: social   Illicit Drugs:   History   Drug Use No       Family History  Family History   Problem Relation Age of Onset     Neurologic Disorder Father         MS     Heart Disease Father         irregular heart rate     Diabetes Father      Melanoma Father      Sleep Apnea Father      Other Cancer Father      Cancer Maternal Grandfather         lung     Other Cancer Maternal Grandfather      Cancer Paternal Grandmother         stomach     Other Cancer Paternal Grandmother      Cancer Mother      Other Cancer Mother      Thyroid Disease Mother      Gynecology Maternal Aunt         PCOS     Hypertension Maternal Grandmother      Anxiety Disorder Maternal Grandmother      Thyroid Disease Maternal Grandmother      Anxiety Disorder Sister           Raúl Sanchez MD on 4/29/2022      cc: Mikala Luna

## 2022-05-02 ENCOUNTER — HOSPITAL ENCOUNTER (INPATIENT)
Facility: HOSPITAL | Age: 47
LOS: 2 days | Discharge: HOME HEALTH | DRG: 872 | End: 2022-05-05
Attending: EMERGENCY MEDICINE | Admitting: INTERNAL MEDICINE
Payer: COMMERCIAL

## 2022-05-02 ENCOUNTER — APPOINTMENT (OUTPATIENT)
Dept: RADIOLOGY | Facility: HOSPITAL | Age: 47
DRG: 872 | End: 2022-05-02
Attending: INTERNAL MEDICINE
Payer: COMMERCIAL

## 2022-05-02 DIAGNOSIS — L03.311 CELLULITIS OF ABDOMINAL WALL: ICD-10-CM

## 2022-05-02 LAB
ALBUMIN UR-MCNC: NEGATIVE MG/DL
ANION GAP SERPL CALCULATED.3IONS-SCNC: 11 MMOL/L (ref 5–18)
APPEARANCE UR: CLEAR
BILIRUB UR QL STRIP: NEGATIVE
BUN SERPL-MCNC: 22 MG/DL (ref 8–22)
CALCIUM SERPL-MCNC: 9.7 MG/DL (ref 8.5–10.5)
CHLORIDE BLD-SCNC: 99 MMOL/L (ref 98–107)
CO2 SERPL-SCNC: 24 MMOL/L (ref 22–31)
COLOR UR AUTO: ABNORMAL
CREAT SERPL-MCNC: 0.96 MG/DL (ref 0.6–1.1)
ERYTHROCYTE [DISTWIDTH] IN BLOOD BY AUTOMATED COUNT: 16.8 % (ref 10–15)
GFR SERPL CREATININE-BSD FRML MDRD: 73 ML/MIN/1.73M2
GLUCOSE BLD-MCNC: 87 MG/DL (ref 70–125)
GLUCOSE UR STRIP-MCNC: NEGATIVE MG/DL
HCG SERPL QL: NEGATIVE
HCT VFR BLD AUTO: 38.4 % (ref 35–47)
HGB BLD-MCNC: 12.4 G/DL (ref 11.7–15.7)
HGB UR QL STRIP: NEGATIVE
HOLD SPECIMEN: NORMAL
HYALINE CASTS: 1 /LPF
KETONES UR STRIP-MCNC: NEGATIVE MG/DL
LACTATE SERPL-SCNC: 1.1 MMOL/L (ref 0.7–2)
LEUKOCYTE ESTERASE UR QL STRIP: ABNORMAL
MAGNESIUM SERPL-MCNC: 1.8 MG/DL (ref 1.8–2.6)
MCH RBC QN AUTO: 27.7 PG (ref 26.5–33)
MCHC RBC AUTO-ENTMCNC: 32.3 G/DL (ref 31.5–36.5)
MCV RBC AUTO: 86 FL (ref 78–100)
MUCOUS THREADS #/AREA URNS LPF: PRESENT /LPF
NITRATE UR QL: NEGATIVE
PH UR STRIP: 5.5 [PH] (ref 5–7)
PLATELET # BLD AUTO: 192 10E3/UL (ref 150–450)
POTASSIUM BLD-SCNC: 2.8 MMOL/L (ref 3.5–5)
POTASSIUM BLD-SCNC: 3.2 MMOL/L (ref 3.5–5)
RBC # BLD AUTO: 4.47 10E6/UL (ref 3.8–5.2)
RBC URINE: 4 /HPF
SARS-COV-2 RNA RESP QL NAA+PROBE: NEGATIVE
SODIUM SERPL-SCNC: 134 MMOL/L (ref 136–145)
SP GR UR STRIP: 1.02 (ref 1–1.03)
SQUAMOUS EPITHELIAL: 1 /HPF
URATE CRY #/AREA URNS HPF: ABNORMAL /HPF
UROBILINOGEN UR STRIP-MCNC: <2 MG/DL
WBC # BLD AUTO: 13 10E3/UL (ref 4–11)
WBC URINE: 32 /HPF

## 2022-05-02 PROCEDURE — G0378 HOSPITAL OBSERVATION PER HR: HCPCS

## 2022-05-02 PROCEDURE — 83735 ASSAY OF MAGNESIUM: CPT | Performed by: EMERGENCY MEDICINE

## 2022-05-02 PROCEDURE — 96366 THER/PROPH/DIAG IV INF ADDON: CPT

## 2022-05-02 PROCEDURE — 36415 COLL VENOUS BLD VENIPUNCTURE: CPT | Performed by: EMERGENCY MEDICINE

## 2022-05-02 PROCEDURE — 250N000011 HC RX IP 250 OP 636: Performed by: EMERGENCY MEDICINE

## 2022-05-02 PROCEDURE — 85027 COMPLETE CBC AUTOMATED: CPT | Performed by: EMERGENCY MEDICINE

## 2022-05-02 PROCEDURE — C9803 HOPD COVID-19 SPEC COLLECT: HCPCS

## 2022-05-02 PROCEDURE — 258N000003 HC RX IP 258 OP 636: Performed by: EMERGENCY MEDICINE

## 2022-05-02 PROCEDURE — 99220 PR INITIAL OBSERVATION CARE,LEVEL III: CPT | Performed by: INTERNAL MEDICINE

## 2022-05-02 PROCEDURE — 84132 ASSAY OF SERUM POTASSIUM: CPT | Performed by: INTERNAL MEDICINE

## 2022-05-02 PROCEDURE — 96361 HYDRATE IV INFUSION ADD-ON: CPT

## 2022-05-02 PROCEDURE — 84703 CHORIONIC GONADOTROPIN ASSAY: CPT | Performed by: EMERGENCY MEDICINE

## 2022-05-02 PROCEDURE — 81003 URINALYSIS AUTO W/O SCOPE: CPT | Performed by: EMERGENCY MEDICINE

## 2022-05-02 PROCEDURE — 96368 THER/DIAG CONCURRENT INF: CPT

## 2022-05-02 PROCEDURE — 250N000011 HC RX IP 250 OP 636: Performed by: INTERNAL MEDICINE

## 2022-05-02 PROCEDURE — 87635 SARS-COV-2 COVID-19 AMP PRB: CPT | Performed by: EMERGENCY MEDICINE

## 2022-05-02 PROCEDURE — 87077 CULTURE AEROBIC IDENTIFY: CPT | Performed by: EMERGENCY MEDICINE

## 2022-05-02 PROCEDURE — 71045 X-RAY EXAM CHEST 1 VIEW: CPT

## 2022-05-02 PROCEDURE — 96365 THER/PROPH/DIAG IV INF INIT: CPT

## 2022-05-02 PROCEDURE — 87149 DNA/RNA DIRECT PROBE: CPT | Performed by: EMERGENCY MEDICINE

## 2022-05-02 PROCEDURE — 250N000013 HC RX MED GY IP 250 OP 250 PS 637: Performed by: INTERNAL MEDICINE

## 2022-05-02 PROCEDURE — 258N000003 HC RX IP 258 OP 636: Performed by: INTERNAL MEDICINE

## 2022-05-02 PROCEDURE — 87086 URINE CULTURE/COLONY COUNT: CPT | Performed by: EMERGENCY MEDICINE

## 2022-05-02 PROCEDURE — 83605 ASSAY OF LACTIC ACID: CPT | Performed by: EMERGENCY MEDICINE

## 2022-05-02 PROCEDURE — 80048 BASIC METABOLIC PNL TOTAL CA: CPT | Performed by: EMERGENCY MEDICINE

## 2022-05-02 PROCEDURE — 85041 AUTOMATED RBC COUNT: CPT | Performed by: EMERGENCY MEDICINE

## 2022-05-02 PROCEDURE — 99285 EMERGENCY DEPT VISIT HI MDM: CPT | Mod: 25

## 2022-05-02 PROCEDURE — 96375 TX/PRO/DX INJ NEW DRUG ADDON: CPT

## 2022-05-02 PROCEDURE — 36415 COLL VENOUS BLD VENIPUNCTURE: CPT | Performed by: INTERNAL MEDICINE

## 2022-05-02 RX ORDER — BUMETANIDE 2 MG/1
2 TABLET ORAL DAILY
Status: DISCONTINUED | OUTPATIENT
Start: 2022-05-02 | End: 2022-05-05 | Stop reason: HOSPADM

## 2022-05-02 RX ORDER — HEPARIN SODIUM 5000 [USP'U]/.5ML
5000 INJECTION, SOLUTION INTRAVENOUS; SUBCUTANEOUS EVERY 12 HOURS
Status: DISCONTINUED | OUTPATIENT
Start: 2022-05-02 | End: 2022-05-02

## 2022-05-02 RX ORDER — POTASSIUM CHLORIDE 1500 MG/1
20 TABLET, EXTENDED RELEASE ORAL ONCE
Status: COMPLETED | OUTPATIENT
Start: 2022-05-02 | End: 2022-05-02

## 2022-05-02 RX ORDER — VANCOMYCIN HYDROCHLORIDE 1 G/200ML
1000 INJECTION, SOLUTION INTRAVENOUS EVERY 12 HOURS
Status: DISCONTINUED | OUTPATIENT
Start: 2022-05-03 | End: 2022-05-03

## 2022-05-02 RX ORDER — METOPROLOL SUCCINATE 25 MG/1
25 TABLET, EXTENDED RELEASE ORAL DAILY
Status: DISCONTINUED | OUTPATIENT
Start: 2022-05-02 | End: 2022-05-05 | Stop reason: HOSPADM

## 2022-05-02 RX ORDER — LIDOCAINE 40 MG/G
CREAM TOPICAL
Status: DISCONTINUED | OUTPATIENT
Start: 2022-05-02 | End: 2022-05-05 | Stop reason: HOSPADM

## 2022-05-02 RX ORDER — CEFAZOLIN SODIUM 1 G/50ML
2000 SOLUTION INTRAVENOUS ONCE
Status: COMPLETED | OUTPATIENT
Start: 2022-05-02 | End: 2022-05-03

## 2022-05-02 RX ORDER — SERTRALINE HYDROCHLORIDE 100 MG/1
100 TABLET, FILM COATED ORAL DAILY
Status: DISCONTINUED | OUTPATIENT
Start: 2022-05-02 | End: 2022-05-05 | Stop reason: HOSPADM

## 2022-05-02 RX ORDER — GABAPENTIN 100 MG/1
100 CAPSULE ORAL 3 TIMES DAILY
Status: DISCONTINUED | OUTPATIENT
Start: 2022-05-02 | End: 2022-05-05 | Stop reason: HOSPADM

## 2022-05-02 RX ORDER — PIPERACILLIN SODIUM, TAZOBACTAM SODIUM 3; .375 G/15ML; G/15ML
3.38 INJECTION, POWDER, LYOPHILIZED, FOR SOLUTION INTRAVENOUS ONCE
Status: COMPLETED | OUTPATIENT
Start: 2022-05-02 | End: 2022-05-02

## 2022-05-02 RX ORDER — ACETAMINOPHEN 325 MG/1
650 TABLET ORAL EVERY 6 HOURS PRN
Status: DISCONTINUED | OUTPATIENT
Start: 2022-05-02 | End: 2022-05-03

## 2022-05-02 RX ORDER — CINACALCET 30 MG/1
30 TABLET, FILM COATED ORAL
Status: DISCONTINUED | OUTPATIENT
Start: 2022-05-04 | End: 2022-05-05 | Stop reason: HOSPADM

## 2022-05-02 RX ORDER — SEVELAMER CARBONATE 800 MG/1
800 TABLET, FILM COATED ORAL DAILY
Status: DISCONTINUED | OUTPATIENT
Start: 2022-05-03 | End: 2022-05-05 | Stop reason: HOSPADM

## 2022-05-02 RX ORDER — PIPERACILLIN SODIUM, TAZOBACTAM SODIUM 3; .375 G/15ML; G/15ML
3.38 INJECTION, POWDER, LYOPHILIZED, FOR SOLUTION INTRAVENOUS EVERY 8 HOURS
Status: DISCONTINUED | OUTPATIENT
Start: 2022-05-02 | End: 2022-05-02 | Stop reason: ALTCHOICE

## 2022-05-02 RX ORDER — POTASSIUM CHLORIDE 1500 MG/1
40 TABLET, EXTENDED RELEASE ORAL ONCE
Status: COMPLETED | OUTPATIENT
Start: 2022-05-02 | End: 2022-05-02

## 2022-05-02 RX ORDER — PIPERACILLIN SODIUM, TAZOBACTAM SODIUM 3; .375 G/15ML; G/15ML
3.38 INJECTION, POWDER, LYOPHILIZED, FOR SOLUTION INTRAVENOUS EVERY 8 HOURS
Status: DISCONTINUED | OUTPATIENT
Start: 2022-05-02 | End: 2022-05-04

## 2022-05-02 RX ADMIN — RIVAROXABAN 20 MG: 10 TABLET, FILM COATED ORAL at 21:02

## 2022-05-02 RX ADMIN — ACETAMINOPHEN 650 MG: 325 TABLET ORAL at 21:10

## 2022-05-02 RX ADMIN — SERTRALINE HYDROCHLORIDE 100 MG: 100 TABLET, FILM COATED ORAL at 22:33

## 2022-05-02 RX ADMIN — POTASSIUM CHLORIDE 20 MEQ: 20 TABLET, EXTENDED RELEASE ORAL at 21:03

## 2022-05-02 RX ADMIN — BUMETANIDE 2 MG: 2 TABLET ORAL at 21:03

## 2022-05-02 RX ADMIN — VANCOMYCIN HYDROCHLORIDE 2000 MG: 5 INJECTION, POWDER, LYOPHILIZED, FOR SOLUTION INTRAVENOUS at 17:26

## 2022-05-02 RX ADMIN — POTASSIUM CHLORIDE 40 MEQ: 20 TABLET, EXTENDED RELEASE ORAL at 19:17

## 2022-05-02 RX ADMIN — PIPERACILLIN AND TAZOBACTAM 3.38 G: 3; .375 INJECTION, POWDER, LYOPHILIZED, FOR SOLUTION INTRAVENOUS at 18:48

## 2022-05-02 RX ADMIN — METOPROLOL SUCCINATE 25 MG: 25 TABLET, EXTENDED RELEASE ORAL at 21:03

## 2022-05-02 RX ADMIN — SODIUM CHLORIDE 500 ML: 9 INJECTION, SOLUTION INTRAVENOUS at 19:31

## 2022-05-02 RX ADMIN — GABAPENTIN 100 MG: 100 CAPSULE ORAL at 21:03

## 2022-05-02 RX ADMIN — SODIUM CHLORIDE 1000 ML: 9 INJECTION, SOLUTION INTRAVENOUS at 13:33

## 2022-05-02 ASSESSMENT — ENCOUNTER SYMPTOMS
VOMITING: 0
DIARRHEA: 0
NAUSEA: 0
ABDOMINAL PAIN: 1
FEVER: 1
COLOR CHANGE: 1

## 2022-05-02 NOTE — ED PROVIDER NOTES
"    ED Provider In Triage Note  [facility]  Encounter Date: May 2, 2022    Chief Complaint   Patient presents with     Fever         Use of Intrepreter: N/A    Brief HPI:   Bess Enamorado is a 46 year old female presenting to the Emergency Department with a chief complaint of fever last night. Took APAP and fever went down but she is worried about cellulitis on abd and leg. Last APAP was 9am today.    She is unsure if the area in groin and legs is red, but are painful.        Brief Physical Exam:  BP 99/48 (BP Location: Left arm)   Pulse 89   Temp 98  F (36.7  C) (Temporal)   Resp 16   Ht 1.626 m (5' 4\")   Wt 147.9 kg (326 lb)   LMP 04/03/2022   SpO2 97%   BMI 55.96 kg/m    General: Non-toxic appearing  HEENT: Atraumatic  Resp: No respiratory distress  Abdomen: +morbid obesity, abd soft  Neuro: Alert, oriented, answers questions appropriately  Psych: Behavior appropriate  Musculoskeletal: atraumatic  Skin: quick exam in triage did not see any obvious redness for cellulitis on abd but exam extremely limited due to triage space and pt privacy, pt needs good groin skin exam with privacy      Plan Initiated in Triage:  Labs, IVF        PIT Dispo:   Return to lobby while awaiting workup and ED bed availability          Sandra Cormier MD on 5/2/2022 at 12:44 PM        Patient was evaluated by the Physician in Triage due to a limitation of available rooms in the Emergency Department. A plan of care was discussed based on the information obtained on the initial evaluation and patient was counseled to return back to the Emergency Department lobby after this initial evalutaiton until results were obtained or a room became available in the Emergency Department. Patient was counseled not to leave prior to receiving the results of their workup.            Sandra Cormier MD  05/02/22 1240       Sandra Cormier MD  05/02/22 1259    "

## 2022-05-02 NOTE — PHARMACY-VANCOMYCIN DOSING SERVICE
Pharmacy Vancomycin Initial Note  Date of Service May 2, 2022  Patient's  1975  46 year old, female    Indication: Skin and Soft Tissue Infection    Current estimated CrCl = Estimated Creatinine Clearance: 105.2 mL/min (based on SCr of 0.96 mg/dL).    Creatinine for last 3 days  2022:  2:15 PM Creatinine 0.96 mg/dL    Recent Vancomycin Level(s) for last 3 days  No results found for requested labs within last 72 hours.      Vancomycin IV Administrations (past 72 hours)      No vancomycin orders with administrations in past 72 hours.                Nephrotoxins and other renal medications (From now, onward)    Start     Dose/Rate Route Frequency Ordered Stop    22 1700  vancomycin (VANCOCIN) 2,000 mg in sodium chloride 0.9 % 500 mL intermittent infusion         2,000 mg  over 2 Hours Intravenous ONCE 22 1631            Contrast Orders - past 72 hours (72h ago, onward)    None             Plan:  1. Start vancomycin  2000 mg IV once for loading dose (13.5 mg/kg)   2. Please consult again if vancomycin to be continued on the unit.     Erum Rodriguez, [facility]

## 2022-05-02 NOTE — ED PROVIDER NOTES
EMERGENCY DEPARTMENT ENCOUNTER      NAME: Bess Enamorado  AGE: 46 year old female  YOB: 1975  MRN: [id number]  EVALUATION DATE & TIME: 5/2/2022  3:16 PM    PCP: Mikala Luna    ED PROVIDER: Abel Mathew M.D.      Chief Complaint   Patient presents with     Fever         FINAL IMPRESSION:  No diagnosis found.      ED COURSE & MEDICAL DECISION MAKING:    Pertinent Labs & Imaging studies reviewed. (See chart for details)  ED Course as of 05/02/22 1702   Mon May 02, 2022   1606 Patient is a 46-year-old woman with history of MS, obesity, renal failure, lymphedema, CHF, here with fever, abdominal rash.  She has increased pain and swelling in her lymphedema both legs, tenderness and erythema to her pannus.  She is afebrile on arrival here, blood pressure 99/48.  Normal lactate.  Mild leukocytosis.  Its a larger area of induration and erythema to the pannus and I think she would benefit from blood cultures, close monitoring, IV antibiotics to make sure that her symptoms are improving quickly.   1656 I offered transfer patient because we do not have any current inpatient beds.  She declined, said that she felt strongly about not being transferred to an outside hospital.  Hospitalist was paged and she will board here in the emergency department.         Additional ED Course Timestamps:  3:48 PM I met with the patient, obtained history, performed an initial exam, and discussed options and plan for diagnostics and treatment here in the ED. PPE worn: N95 mask, gloves   4:39 PM I spoke to the patient about transferring.     At the conclusion of the encounter I discussed the results of all of the tests and the disposition. The questions were answered. The patient or family acknowledged understanding and was agreeable with the care plan.         MEDICATIONS GIVEN IN THE EMERGENCY:  Medications   vancomycin (VANCOCIN) 2,000 mg in sodium chloride 0.9 % 500 mL intermittent infusion (has no administration in time  "range)   0.9% sodium chloride BOLUS (0 mLs Intravenous Stopped 5/2/22 3403)         NEW PRESCRIPTIONS STARTED AT TODAY'S ER VISIT  New Prescriptions    No medications on file          =================================================================    HPI    Patient information was obtained from: Patient     Use of : N/A         Bess DILIA Enamorado is a 46 year old female with a pertinent history of CHF, hypertension, NSTEMI, multiple sclerosis, lymphedema, and s/p PICC who presents to this ED for evaluation of fever.     Last night, the patient reports developing a fever of 100 accompanied by some lower abdominal tenderness. She took Tylenol for her fever and had relief. The patient adds that she usually develops infections with her fevers, stating that she is \"afraid that it's cellulitis\"; the patient reports that she has been diagnosed with cellulitis once a year for over the past 3 years. She also endorses that her lymphedema made her \"thighs become more tender.\" This morning, the patient also reports developing another fever of 99. She took Tylenol again and has been normal since. However, the patient notes that she developed some redness on her lower abdomen, prompting her to be present in the ED. The patient does report of some improvement of the lymphedema in her legs due to the \"garmet\" she has been wearing, but is unsure about the improvement of her thighs because she \"can't see as well as she used to.\" She denies nausea, vomiting, and diarrhea. No other symptoms or complaints at this time. The patient does report having MS and does not take any immunosuppressants. She is on Xarelto.            REVIEW OF SYSTEMS   Review of Systems   Constitutional: Positive for fever.   Gastrointestinal: Positive for abdominal pain. Negative for diarrhea, nausea and vomiting.   Musculoskeletal:        Positive for \"thigh\" tenderness.   Skin: Positive for color change.   All other systems reviewed and are negative.   "     PAST MEDICAL HISTORY:  Past Medical History:   Diagnosis Date     Acute on chronic diastolic congestive heart failure (H) 2/9/2022     Arthritis 2019    Hips     ASCUS favor benign 8/2015    Neg HPV     Depressive disorder 2010     H/O colposcopy with cervical biopsy 9/14/15    Bx & ECC - negative     Hypertension 2019     LSIL on Pap smear 7/2014    Neg high risk HPV     Multiple sclerosis (H) 8/29/2005 9/18/200pt dx with MS 2004--dx by liza and is followed by Dr. Steven Ayala 10/2016     UNSPEC CONSTIPATION 9/18/2006 9/18/2006   Has tried otc suppository and otc lax.        PAST SURGICAL HISTORY:  Past Surgical History:   Procedure Laterality Date     CHOLECYSTECTOMY, LAPOROSCOPIC      Cholecystectomy, Laparoscopic     COLONOSCOPY  2007?    Bathroom issue..results normal     COMBINED CYSTOSCOPY, RETROGRADES, EXCHANGE STENT URETER(S) Right 2/21/2022    Procedure: CYSTOSCOPY, WITH right RETROGRADE PYELOGRAM AND right URETERAL STENT PLACEMENT;  Surgeon: Doyle Palomares MD;  Location: [facility]     OPEN REDUCTION INTERNAL FIXATION TOE(S)  4/13/2012    Procedure:OPEN REDUCTION INTERNAL FIXATION TOE(S); Open reduction internal fixation right proximal fifth metatarsal fracture-   Anes-choice block; Surgeon:LEY, JEFFREY DUANE; Location:[place] OR     PICC TRIPLE LUMEN PLACEMENT  2/21/2022                CURRENT MEDICATIONS:    Current Facility-Administered Medications   Medication     vancomycin (VANCOCIN) 2,000 mg in sodium chloride 0.9 % 500 mL intermittent infusion     Current Outpatient Medications   Medication     acetaminophen (TYLENOL) 325 MG tablet     bumetanide (BUMEX) 2 MG tablet     Cholecalciferol (VITAMIN D3 PO)     cinacalcet (SENSIPAR) 30 MG tablet     desogestrel-ethinyl estradiol (APRI) 0.15-30 MG-MCG tablet     gabapentin (NEURONTIN) 100 MG capsule     metoprolol succinate ER (TOPROL-XL) 25 MG 24 hr tablet     miconazole (MICATIN) 2 % external powder     rivaroxaban  ANTICOAGULANT (XARELTO) 20 MG TABS tablet     sertraline (ZOLOFT) 100 MG tablet     sevelamer carbonate (RENVELA) 800 MG tablet     ACE/ARB/ARNI NOT PRESCRIBED (INTENTIONAL)       ALLERGIES:  Allergies   Allergen Reactions     Nkda [No Known Drug Allergies]        FAMILY HISTORY:  Family History   Problem Relation Age of Onset     Neurologic Disorder Father         MS     Heart Disease Father         irregular heart rate     Diabetes Father      Melanoma Father      Sleep Apnea Father      Other Cancer Father      Cancer Maternal Grandfather         lung     Other Cancer Maternal Grandfather      Cancer Paternal Grandmother         stomach     Other Cancer Paternal Grandmother      Cancer Mother      Other Cancer Mother      Thyroid Disease Mother      Gynecology Maternal Aunt         PCOS     Hypertension Maternal Grandmother      Anxiety Disorder Maternal Grandmother      Thyroid Disease Maternal Grandmother      Anxiety Disorder Sister        SOCIAL HISTORY:   Social History     Socioeconomic History     Marital status: Single   Occupational History     Employer: [employer]   Tobacco Use     Smoking status: Never Smoker     Smokeless tobacco: Never Used   Vaping Use     Vaping Use: Never used   Substance and Sexual Activity     Alcohol use: Yes     Comment: social     Drug use: No     Sexual activity: Not Currently     Partners: Male     Birth control/protection: Pill   Other Topics Concern     Parent/sibling w/ CABG, MI or angioplasty before 65F 55M? No   Social History Narrative    , 3rd-5th grade     Social Determinants of Health     Financial Resource Strain: Low Risk      Difficulty of Paying Living Expenses: Not very hard   Food Insecurity: No Food Insecurity     Worried About Running Out of Food in the Last Year: Never true     Ran Out of Food in the Last Year: Never true   Transportation Needs: No Transportation Needs     Lack of Transportation (Medical): No     Lack of  "Transportation (Non-Medical): No   Physical Activity: Inactive     Days of Exercise per Week: 0 days     Minutes of Exercise per Session: 0 min   Social Connections: Unknown     Frequency of Communication with Friends and Family: Twice a week     Frequency of Social Gatherings with Friends and Family: Twice a week   Intimate Partner Violence: Not At Risk     Fear of Current or Ex-Partner: No     Emotionally Abused: No     Physically Abused: No     Sexually Abused: No       VITALS:  BP 99/48 (BP Location: Left arm)   Pulse 89   Temp 98  F (36.7  C) (Temporal)   Resp 16   Ht 1.626 m (5' 4\")   Wt 147.9 kg (326 lb)   LMP 04/03/2022   SpO2 97%   BMI 55.96 kg/m      PHYSICAL EXAM    Constitutional: Well developed, obese. Comfortable appearing.  HENT: Normocephalic, atraumatic, mucous membranes moist, nose normal. Neck- Supple, gross ROM intact.   Eyes: Pupils mid-range, conjunctiva without injection, no discharge.   Respiratory: Clear to auscultation bilaterally, no respiratory distress, no wheezing, speaks full sentences easily. No cough.  Cardiovascular: Tachycardic, regular rhythm, no murmurs. No pedal edema, 2+ DP pulses.   GI: Soft, no masses. Has erythema and induration to her abdomen, mildly tender, no rash to inguinal folds   Musculoskeletal: Moving all 4 extremities intentionally and without pain. No obvious deformity.  Skin: Warm, dry. Bilateral lower extremity lymphedema with erythema to posterior distal thighs and mildy tender   Neurologic: Alert & oriented x 3, cranial nerves grossly intact.  Psychiatric: Affect normal, cooperative.       LAB:  All pertinent labs reviewed and interpreted.  Labs Ordered and Resulted from Time of ED Arrival to Time of ED Departure   CBC WITH PLATELETS - Abnormal       Result Value    WBC Count 13.0 (*)     RBC Count 4.47      Hemoglobin 12.4      Hematocrit 38.4      MCV 86      MCH 27.7      MCHC 32.3      RDW 16.8 (*)     Platelet Count 192     BASIC METABOLIC PANEL - " Abnormal    Sodium 134 (*)     Potassium 3.2 (*)     Chloride 99      Carbon Dioxide (CO2) 24      Anion Gap 11      Urea Nitrogen 22      Creatinine 0.96      Calcium 9.7      Glucose 87      GFR Estimate 73     LACTIC ACID WHOLE BLOOD - Normal    Lactic Acid 1.1     HCG QUALITATIVE PREGNANCY - Normal    hCG Serum Qualitative Negative     COVID-19 VIRUS (CORONAVIRUS) BY PCR   ROUTINE UA WITH MICROSCOPIC REFLEX TO CULTURE   BLOOD CULTURE   BLOOD CULTURE       RADIOLOGY:  Reviewed all pertinent imaging. Please see official radiology report.  No orders to display       EKG:    All EKG interpretations will be found in ED course above.      I, William Vernon am serving as a scribe to document services personally performed by Dr. Abel Mathew based on my observation and the provider's statements to me. I, Abel Mathew MD attest that William Vernon is acting in a scribe capacity, has observed my performance of the services and has documented them in accordance with my direction.    Abel Mathew M.D.  Emergency Medicine  [facility] EMERGENCY DEPARTMENT  [facility] [facility] [id number] [phone]  Dept: [phone]     Abel Mathew MD  05/02/22 4174

## 2022-05-02 NOTE — PHARMACY-VANCOMYCIN DOSING SERVICE
"Pharmacy Vancomycin Initial Note  Date of Service May 2, 2022  Patient's  1975  46 year old, female    Indication: Skin and Soft Tissue Infection    Current estimated CrCl = Estimated Creatinine Clearance: 105.2 mL/min (based on SCr of 0.96 mg/dL).    Creatinine for last 3 days  2022:  2:15 PM Creatinine 0.96 mg/dL    Recent Vancomycin Level(s) for last 3 days  No results found for requested labs within last 72 hours.      Vancomycin IV Administrations (past 72 hours)                   vancomycin (VANCOCIN) 2,000 mg in sodium chloride 0.9 % 500 mL intermittent infusion (mg) 2,000 mg New Bag 22 1726                Nephrotoxins and other renal medications (From now, onward)    Start     Dose/Rate Route Frequency Ordered Stop    22 0600  vancomycin (VANCOCIN) 1000 mg in dextrose 5% 200 mL PREMIX         1,000 mg  200 mL/hr over 1 Hours Intravenous EVERY 12 HOURS 22 1804      22 2339  piperacillin-tazobactam (ZOSYN) 3.375 g vial to attach to  mL bag        Note to Pharmacy: Extended infusion dosing to start 6 hours after initial infusion.   \"Followed by\" Linked Group Details    3.375 g  over 240 Minutes Intravenous EVERY 8 HOURS 22 1739      22 1800  piperacillin-tazobactam (ZOSYN) 3.375 g vial to attach to  mL bag        \"Followed by\" Linked Group Details    3.375 g  over 30 Minutes Intravenous ONCE 22 1739      22 1700  vancomycin (VANCOCIN) 2,000 mg in sodium chloride 0.9 % 500 mL intermittent infusion         2,000 mg  over 2 Hours Intravenous ONCE 22 1631            Contrast Orders - past 72 hours (72h ago, onward)    None          [employer] Prediction of Planned Initial Vancomycin Regimen    Loading dose: 2000 mg at 17:00 2022.  Regimen: 1000 mg IV every 12 hours.  Start time: 18:04 on 2022  Exposure target: AUC24 (range)400-600 mg/L.hr   AUC24,ss: 506 mg/L.hr  Probability of AUC24 > 400: 67 %  Ctrough,ss: 14.7 " mg/L  Probability of Ctrough,ss > 20: 34 %  Probability of nephrotoxicity (Lodise ARMANDO 2009): 10 %        Plan:  1. Start vancomycin  1000 mg (6.8 mg/kg) IV q12h.   2. Vancomycin monitoring method: AUC  3. Vancomycin therapeutic monitoring goal: 400-600 mg*h/L  4. Pharmacy will check vancomycin levels as appropriate in 1-3 Days.    5. Serum creatinine levels will be ordered daily for the first week of therapy and at least twice weekly for subsequent weeks.      Erum Rodriguez, [facility]

## 2022-05-02 NOTE — H&P
Admission History and Physical   Bess Enamorado,    1975,   [id number]    [facility]   Cellulitis of abdominal wall [L03.311]    PCP: Mikala Luna,    Code status:  Full Code       Extended Emergency Contact Information  Primary Emergency Contact: KEVIN VIERA, [facility]  Home Phone: [phone]  Mobile Phone: [phone]  Relation: Friend  Secondary Emergency Contact: [place] ENAMORADO  Home Phone: [phone]  Mobile Phone: [phone]  Relation: Sister       Active Problems:    Cellulitis of abdominal wall       ASSESSMENT AND PLAN:  Fever.  Denies urinary or pulmonary symptoms.  Physical exam shows erythematous area abdominal region [pannus] admit for treatment for abdominal wall cellulitis.  IV Zosyn/vancomycin.  Check chest x-ray, check UA, follow blood culture results    Multiple sclerosis, limited mobility, uses a walker to ambulate.  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  Follow vitals closely    History of bilateral lower extremity lymphedema no lower extremity pain currently    CKD stage II-III, follows with nephrology clinic, BMP daily, avoid nephrotoxins    NARCISA stable continue CPAP    DVT PPX:  On rivaroxaban    Barriers to discharge fever on antibiotics    Anticipated Discharge date observation less than 2 midnight          Chief Complaint:  Fever 24-hour duration    HPI:  Bess Enamorado is a 46 year old old female presenting with fever of 24-hour duration.  Denies pulmonary symptoms, no urinary symptoms, no chest pain or breathing difficulty, no diarrhea or abdominal pain.  She was found to have tenderness and erythema to her pannus anterior aspect.  She was admitted  for treatment for abdominal wall cellulitis.      Medical History  Past Medical History:   Diagnosis Date     Acute on chronic diastolic congestive heart failure (H) 2/9/2022     Arthritis 2019    Hips     ASCUS favor benign 8/2015    Neg HPV     Depressive disorder 2010     H/O colposcopy with cervical biopsy 9/14/15    Bx & ECC - negative     Hypertension 2019     LSIL on Pap smear 7/2014    Neg high risk HPV     Multiple sclerosis (H) 8/29/2005 9/18/200pt dx with MS 2004--dx by neurolgist and is followed by Dr. Steven Ayala 10/2016     UNSPEC CONSTIPATION 9/18/2006 9/18/2006   Has tried otc suppository and otc lax.           Surgical History  She  has a past surgical history that includes cholecystectomy, laporoscopic; Open reduction internal fixation toe(s) (4/13/2012); colonoscopy (2007?); PICC/Midline Placement (2/21/2022); and Combined cystoscopy, retrogrades, exchange stent ureter(s) (Right, 2/21/2022).      SOCIAL HISTORY:  Social History     Socioeconomic History     Marital status: Single     Spouse name: Not on file     Number of children: Not on file     Years of education: Not on file     Highest education level: Not on file   Occupational History     Employer: [employer]   Tobacco Use     Smoking status: Never Smoker     Smokeless tobacco: Never Used   Vaping Use     Vaping Use: Never used   Substance and Sexual Activity     Alcohol use: Yes     Comment: social     Drug use: No     Sexual activity: Not Currently     Partners: Male     Birth control/protection: Pill   Other Topics Concern     Parent/sibling w/ CABG, MI or angioplasty before 65F 55M? No   Social History Narrative    , 3rd-5th grade     Social Determinants of Health     Financial Resource Strain: Low Risk      Difficulty of Paying Living Expenses: Not very hard   Food Insecurity: No Food Insecurity     Worried About Running Out of Food in the Last Year: Never true     Ran Out of Food in the  "Last Year: Never true   Transportation Needs: No Transportation Needs     Lack of Transportation (Medical): No     Lack of Transportation (Non-Medical): No   Physical Activity: Inactive     Days of Exercise per Week: 0 days     Minutes of Exercise per Session: 0 min   Stress: Not on file   Social Connections: Unknown     Frequency of Communication with Friends and Family: Twice a week     Frequency of Social Gatherings with Friends and Family: Twice a week     Attends Baptism Services: Not on file     Active Member of Clubs or Organizations: Not on file     Attends Club or Organization Meetings: Not on file     Marital Status: Not on file   Intimate Partner Violence: Not At Risk     Fear of Current or Ex-Partner: No     Emotionally Abused: No     Physically Abused: No     Sexually Abused: No   Housing Stability: Not on file       FAMILY HISTORY:  Family History   Problem Relation Age of Onset     Neurologic Disorder Father         MS     Heart Disease Father         irregular heart rate     Diabetes Father      Melanoma Father      Sleep Apnea Father      Other Cancer Father      Cancer Maternal Grandfather         lung     Other Cancer Maternal Grandfather      Cancer Paternal Grandmother         stomach     Other Cancer Paternal Grandmother      Cancer Mother      Other Cancer Mother      Thyroid Disease Mother      Gynecology Maternal Aunt         PCOS     Hypertension Maternal Grandmother      Anxiety Disorder Maternal Grandmother      Thyroid Disease Maternal Grandmother      Anxiety Disorder Sister          ALLERGIES:  Allergies   Allergen Reactions     Nkda [No Known Drug Allergies]        MEDICATIONS:  Reviewed by pharmacy      ROS:  12 point review of systems reviewed and is negative except as stated above      PHYSICAL EXAM:  /70   Pulse 94   Temp 98  F (36.7  C) (Temporal)   Resp 16   Ht 1.626 m (5' 4\")   Wt 147.9 kg (326 lb)   LMP 04/03/2022   SpO2 94%   BMI 55.96 kg/m      Examined with " nurse chaperone  General: Alert and oriented x 3. Not in obvious distress.  Very high BMI  HEENT: Pupils equal and reactive,ENT WNL   Neck- supple, No JVP elevation, lymphadenopathy or thyromegaly. Trachea-central.  Chest: Clear to auscultation bilaterally.  Heart: S1S2 regular. No M/R/G.  Abdomen: Soft. NT, ND. No organomegaly. Bowel sounds- active.  Pannus with mild erythema anterior aspect, mild tenderness, no crepitus  Extremities: Bilateral lower extremity lymphedema noted  Neuro: No focal neurological deficit  Skin: no skin rashes     DIAGNOSTIC DATA:          Advanced Care Planning       Leonides Patino MD

## 2022-05-02 NOTE — ED TRIAGE NOTES
"Fever started last night. Took Tylenlo at 9 aml with relief. Reports \"I think I might have cellulitis.\" Patient noticed redness on her lower abdomen and thighs.       "

## 2022-05-02 NOTE — PHARMACY-ADMISSION MEDICATION HISTORY
Pharmacy Note - Admission Medication History    Pertinent Provider Information:      ______________________________________________________________________    Prior To Admission (PTA) med list completed and updated in EMR.       PTA Med List   Medication Sig Note Last Dose     acetaminophen (TYLENOL) 325 MG tablet Take 650 mg by mouth every 6 hours as needed for mild pain   5/2/2022 at Unknown time     bumetanide (BUMEX) 2 MG tablet Take 1 tablet (2 mg) by mouth daily  5/1/2022 at am     Cholecalciferol (VITAMIN D3 PO) Take 10,000 Units by mouth daily   5/1/2022 at Unknown time     cinacalcet (SENSIPAR) 30 MG tablet Take 1 tablet (30 mg) by mouth three times a week 5/2/2022: Monday/Wednesday/Friday      desogestrel-ethinyl estradiol (APRI) 0.15-30 MG-MCG tablet TAKE 1 TABLET DAILY CONTINUOUSLY-OMIT PLACEBPO TABS UNTIL AFTER 3 MONTHS OF HORMONE TABS  More than a month at Unknown time     gabapentin (NEURONTIN) 100 MG capsule Take 1 capsule (100 mg) by mouth in the morning and 1 capsule (100 mg) at noon and 1 capsule (100 mg) in the evening.  5/1/2022 at Unknown time     metoprolol succinate ER (TOPROL-XL) 25 MG 24 hr tablet TAKE 1 TABLET(25 MG) BY MOUTH DAILY  5/1/2022 at Unknown time     miconazole (MICATIN) 2 % external powder Apply topically 2 times daily (Patient taking differently: Apply topically 2 times daily as needed)       rivaroxaban ANTICOAGULANT (XARELTO) 20 MG TABS tablet Take 1 tablet (20 mg) by mouth daily (with dinner)  5/1/2022 at Unknown time     sertraline (ZOLOFT) 100 MG tablet Take 1 tablet (100 mg) by mouth daily  5/1/2022 at Unknown time     sevelamer carbonate (RENVELA) 800 MG tablet Take 1 tablet (800 mg) by mouth in the morning.  5/1/2022 at Unknown time       Information source(s): Patient  Method of interview communication: in-person    Summary of Changes to PTA Med List  New: none  Discontinued: none  Changed: none    Patient was asked about OTC/herbal products specifically.  PTA med  list reflects this.    In the past week, patient estimated taking medication this percent of the time:  greater than 90%.    Allergies were reviewed, assessed, and updated with the patient.      Patient does not anticipate needing any multi-use medications during admission.    The information provided in this note is only as accurate as the sources available at the time of the update(s).    Thank you for the opportunity to participate in the care of this patient.    Alicia Headley [facility]  5/2/2022 4:27 PM

## 2022-05-03 LAB
ANION GAP SERPL CALCULATED.3IONS-SCNC: 11 MMOL/L (ref 5–18)
BUN SERPL-MCNC: 15 MG/DL (ref 8–22)
CALCIUM SERPL-MCNC: 9.2 MG/DL (ref 8.5–10.5)
CHLORIDE BLD-SCNC: 105 MMOL/L (ref 98–107)
CO2 SERPL-SCNC: 22 MMOL/L (ref 22–31)
CREAT SERPL-MCNC: 0.74 MG/DL (ref 0.6–1.1)
ENTEROCOCCUS FAECALIS: NOT DETECTED
ENTEROCOCCUS FAECIUM: NOT DETECTED
ERYTHROCYTE [DISTWIDTH] IN BLOOD BY AUTOMATED COUNT: 16.6 % (ref 10–15)
GFR SERPL CREATININE-BSD FRML MDRD: >90 ML/MIN/1.73M2
GLUCOSE BLD-MCNC: 110 MG/DL (ref 70–125)
HCT VFR BLD AUTO: 35.6 % (ref 35–47)
HGB BLD-MCNC: 11.2 G/DL (ref 11.7–15.7)
LACTATE SERPL-SCNC: 0.6 MMOL/L (ref 0.7–2)
LISTERIA SPECIES (DETECTED/NOT DETECTED): NOT DETECTED
MCH RBC QN AUTO: 27.2 PG (ref 26.5–33)
MCHC RBC AUTO-ENTMCNC: 31.5 G/DL (ref 31.5–36.5)
MCV RBC AUTO: 86 FL (ref 78–100)
PLATELET # BLD AUTO: 155 10E3/UL (ref 150–450)
POTASSIUM BLD-SCNC: 3.4 MMOL/L (ref 3.5–5)
RBC # BLD AUTO: 4.12 10E6/UL (ref 3.8–5.2)
SODIUM SERPL-SCNC: 138 MMOL/L (ref 136–145)
STAPHYLOCOCCUS AUREUS: NOT DETECTED
STAPHYLOCOCCUS EPIDERMIDIS: DETECTED
STAPHYLOCOCCUS LUGDUNENSIS: NOT DETECTED
STREPTOCOCCUS AGALACTIAE: NOT DETECTED
STREPTOCOCCUS ANGINOSUS GROUP: NOT DETECTED
STREPTOCOCCUS PNEUMONIAE: NOT DETECTED
STREPTOCOCCUS PYOGENES: NOT DETECTED
STREPTOCOCCUS SPECIES: NOT DETECTED
WBC # BLD AUTO: 7.4 10E3/UL (ref 4–11)

## 2022-05-03 PROCEDURE — 5A09357 ASSISTANCE WITH RESPIRATORY VENTILATION, LESS THAN 24 CONSECUTIVE HOURS, CONTINUOUS POSITIVE AIRWAY PRESSURE: ICD-10-PCS | Performed by: INTERNAL MEDICINE

## 2022-05-03 PROCEDURE — 250N000013 HC RX MED GY IP 250 OP 250 PS 637: Performed by: INTERNAL MEDICINE

## 2022-05-03 PROCEDURE — 120N000001 HC R&B MED SURG/OB

## 2022-05-03 PROCEDURE — 999N000157 HC STATISTIC RCP TIME EA 10 MIN

## 2022-05-03 PROCEDURE — 99232 SBSQ HOSP IP/OBS MODERATE 35: CPT | Performed by: INTERNAL MEDICINE

## 2022-05-03 PROCEDURE — 96376 TX/PRO/DX INJ SAME DRUG ADON: CPT

## 2022-05-03 PROCEDURE — 250N000011 HC RX IP 250 OP 636: Performed by: INTERNAL MEDICINE

## 2022-05-03 PROCEDURE — 80048 BASIC METABOLIC PNL TOTAL CA: CPT | Performed by: INTERNAL MEDICINE

## 2022-05-03 PROCEDURE — 36415 COLL VENOUS BLD VENIPUNCTURE: CPT | Performed by: INTERNAL MEDICINE

## 2022-05-03 PROCEDURE — 83605 ASSAY OF LACTIC ACID: CPT | Performed by: INTERNAL MEDICINE

## 2022-05-03 PROCEDURE — 85027 COMPLETE CBC AUTOMATED: CPT | Performed by: INTERNAL MEDICINE

## 2022-05-03 PROCEDURE — G0378 HOSPITAL OBSERVATION PER HR: HCPCS

## 2022-05-03 PROCEDURE — 94660 CPAP INITIATION&MGMT: CPT

## 2022-05-03 PROCEDURE — 96366 THER/PROPH/DIAG IV INF ADDON: CPT

## 2022-05-03 RX ORDER — POTASSIUM CHLORIDE 1500 MG/1
20 TABLET, EXTENDED RELEASE ORAL ONCE
Status: COMPLETED | OUTPATIENT
Start: 2022-05-03 | End: 2022-05-03

## 2022-05-03 RX ORDER — POTASSIUM CHLORIDE 1500 MG/1
40 TABLET, EXTENDED RELEASE ORAL ONCE
Status: COMPLETED | OUTPATIENT
Start: 2022-05-03 | End: 2022-05-03

## 2022-05-03 RX ORDER — ACETAMINOPHEN 325 MG/1
650 TABLET ORAL EVERY 4 HOURS PRN
Status: DISCONTINUED | OUTPATIENT
Start: 2022-05-03 | End: 2022-05-05 | Stop reason: HOSPADM

## 2022-05-03 RX ADMIN — SEVELAMER CARBONATE 800 MG: 800 TABLET, FILM COATED ORAL at 08:44

## 2022-05-03 RX ADMIN — ACETAMINOPHEN 650 MG: 325 TABLET ORAL at 17:16

## 2022-05-03 RX ADMIN — VANCOMYCIN HYDROCHLORIDE 1000 MG: 1 INJECTION, SOLUTION INTRAVENOUS at 05:06

## 2022-05-03 RX ADMIN — RIVAROXABAN 20 MG: 10 TABLET, FILM COATED ORAL at 17:09

## 2022-05-03 RX ADMIN — GABAPENTIN 100 MG: 100 CAPSULE ORAL at 13:08

## 2022-05-03 RX ADMIN — GABAPENTIN 100 MG: 100 CAPSULE ORAL at 08:43

## 2022-05-03 RX ADMIN — METOPROLOL SUCCINATE 25 MG: 25 TABLET, EXTENDED RELEASE ORAL at 08:43

## 2022-05-03 RX ADMIN — MICONAZOLE NITRATE: 20 POWDER TOPICAL at 20:06

## 2022-05-03 RX ADMIN — PIPERACILLIN AND TAZOBACTAM 3.38 G: 3; .375 INJECTION, POWDER, LYOPHILIZED, FOR SOLUTION INTRAVENOUS at 17:09

## 2022-05-03 RX ADMIN — POTASSIUM CHLORIDE 40 MEQ: 20 TABLET, EXTENDED RELEASE ORAL at 08:43

## 2022-05-03 RX ADMIN — PIPERACILLIN AND TAZOBACTAM 3.38 G: 3; .375 INJECTION, POWDER, LYOPHILIZED, FOR SOLUTION INTRAVENOUS at 08:43

## 2022-05-03 RX ADMIN — GABAPENTIN 100 MG: 100 CAPSULE ORAL at 20:04

## 2022-05-03 RX ADMIN — BUMETANIDE 2 MG: 2 TABLET ORAL at 08:44

## 2022-05-03 RX ADMIN — PIPERACILLIN AND TAZOBACTAM 3.38 G: 3; .375 INJECTION, POWDER, LYOPHILIZED, FOR SOLUTION INTRAVENOUS at 00:10

## 2022-05-03 RX ADMIN — POTASSIUM CHLORIDE 20 MEQ: 20 TABLET, EXTENDED RELEASE ORAL at 13:08

## 2022-05-03 RX ADMIN — SERTRALINE HYDROCHLORIDE 100 MG: 100 TABLET, FILM COATED ORAL at 08:43

## 2022-05-03 ASSESSMENT — ACTIVITIES OF DAILY LIVING (ADL)
ADLS_ACUITY_SCORE: 16

## 2022-05-03 NOTE — PROGRESS NOTES
[facility]    Hospitalist Progress Note    Assessment & Plan   46 year old female who was admitted on 5/2/2022 -- this is 4th admission in 2 months, and this time for abd wall pain and found to have abd wall cellulitis, and probable UTI:    Impression:   Principal Problem:    Cellulitis of abdominal wall (Panus)   -- IV Zosyn, stop IV Vanco      Urinary tract infection   -- UC pending       Active Problems:    Multiple sclerosis       Morbid obesity -- BMI 56.0      Benign essential hypertension      NARCISA on CPAP      Recurrent UTI's    -- with check PVR with bladder scanning       Plan:  As above     DVT Prophylaxis: DOAC  Code Status: Full Code    Disposition: Expected discharge home in 2 days, might need home care    Cody Liang MD  Pager [phone]  Cell Phone [phone]  Text Page (7am to 6pm)    Interval History   Mild lower abd pain, no dysuria -- but can not tell when she has a bladder infection.     Physical Exam   Temp: 98.5  F (36.9  C) Temp src: Oral BP: 102/59 Pulse: 101   Resp: 18 SpO2: 92 % O2 Device: None (Room air)    Vitals:    05/02/22 1247   Weight: 147.9 kg (326 lb)     Vital Signs with Ranges  Temp:  [97.8  F (36.6  C)-99.9  F (37.7  C)] 98.5  F (36.9  C)  Pulse:  [] 101  Resp:  [18-19] 18  BP: ()/(52-78) 102/59  SpO2:  [90 %-96 %] 92 %  I/O last 3 completed shifts:  In: -   Out: 1825 [Urine:1825]    # Pain Assessment:  Current Pain Score 5/3/2022   Patient currently in pain? denies   Pain score (0-10) -   Pain location -   Pain descriptors -   Bess valles pain level was assessed and she currently denies pain.        Constitutional: Awake, alert, cooperative, no apparent distress  Respiratory: Clear to auscultation bilaterally, no crackles or wheezing  Cardiovascular: Regular rate and rhythm, normal S1 and S2, and no murmur noted  GI: Normal bowel sounds, soft, obese with prominent panus and erythema and abd wall edema and mild tenderness   Extrem:  No calf tenderness, 1+ bilateral ankle edema and left foot is red but not tender  Neuro: Ox3, left foot with spasms when touched, and bilateral leg weakness.     Medications       bumetanide  2 mg Oral Daily     [START ON 5/4/2022] cinacalcet  30 mg Oral Once per day on Mon Wed Fri     gabapentin  100 mg Oral TID     metoprolol succinate ER  25 mg Oral Daily     miconazole   Topical BID     piperacillin-tazobactam  3.375 g Intravenous Q8H     rivaroxaban ANTICOAGULANT  20 mg Oral Daily with supper     sertraline  100 mg Oral Daily     sevelamer carbonate  800 mg Oral Daily     sodium chloride (PF)  3 mL Intracatheter Q8H       Data   Recent Labs   Lab 05/03/22  0610 05/02/22  1759 05/02/22  1415 05/02/22  1332   WBC 7.4  --   --  13.0*   HGB 11.2*  --   --  12.4   MCV 86  --   --  86     --   --  192     --  134*  --    POTASSIUM 3.4* 2.8* 3.2*  --    CHLORIDE 105  --  99  --    CO2 22  --  24  --    BUN 15  --  22  --    CR 0.74  --  0.96  --    ANIONGAP 11  --  11  --    EVITA 9.2  --  9.7  --      --  87  --        Imaging:   Recent Results (from the past 24 hour(s))   XR Chest Port 1 View    Narrative    EXAM: XR CHEST PORT 1 VIEW  LOCATION: [facility]  DATE/TIME: 5/2/2022 5:14 PM    INDICATION: Fever  COMPARISON: 04/06/2022 and 03/23/2022.      Impression    IMPRESSION: Shallow inspiration and prominent paracardial fat further exaggerate mild cardiac enlargement and pulmonary venous congestion. No focal pulmonary consolidation. No visible pleural fluid. No significant change.

## 2022-05-03 NOTE — PLAN OF CARE
Goal Outcome Evaluation:    Plan of Care Reviewed With: patient          Outcome Evaluation: pt in bed allshift, continues on iv zosyn for cellulitis condition of abdomen and l/e. lymphedema wraps removed for skin care & pt has been in bed supine with repositioning for the shift. does have occasional pain in the left leg cellulitis area & tenderness in abdomen. tolerating activity & awaiting a walker so pt can resume her capability with ambulation with walker as she does at home.

## 2022-05-03 NOTE — PLAN OF CARE
"PRIMARY DIAGNOSIS: \"GENERIC\" NURSING  OUTPATIENT/OBSERVATION GOALS TO BE MET BEFORE DISCHARGE:  ADLs back to baseline: Yes    Activity and level of assistance: Up with standby assistance.    Pain status: Pain free.    Return to near baseline physical activity: No     Discharge Planner Nurse   Safe discharge environment identified: Yes  Barriers to discharge: Yes       Entered by: Avelina Glover RN 05/02/2022 11:59 PM     Please review provider order for any additional goals.   Nurse to notify provider when observation goals have been met and patient is ready for discharge.Goal Outcome Evaluation:     Bilateral lower extremities up to thighs red, warm and edematous. Lower abdomen cellulitis area outlined.  Patient reported generalized discomfort rated at 4/10. Temp 99.8.  Tylenol given for comfort.    Temp: 99.9  F (37.7  C) Temp src: Oral BP: 97/52 Pulse: 105   Resp: 18 SpO2: 96 % O2 Device: None (Room air)      C.Pap on at HS.                         "

## 2022-05-03 NOTE — PROVIDER NOTIFICATION
Paged Dr. Barbour regarding temp of 101 @ 5:05pm. Awaiting new orders/call back.  Dr. Barbour called back @ 5:11pm. Pt is already on IV abx, will recheck labs tomorrow morning. PRN tylenol will be given for fever. No blood cultures or lactic acid to be drawn tonight per hospitalist.

## 2022-05-03 NOTE — UTILIZATION REVIEW
Inpatient appropriate  Admission Status; Secondary Review Determination     Under the authority of the Utilization Management Committee, the utilization review process indicated a secondary review on the above patient. The review outcome is based on review of the medical records, discussions with staff, and applying clinical experience noted on the date of the review.     (x) Inpatient Status Appropriate - This patient's medical care is consistent with medical management for inpatient care and reasonable inpatient medical practice.     RATIONALE FOR DETERMINATION   46 years old female evaluated in the ED on 5/2/2022 for fever, redness of the lower abdomen and thighs.  Past medical history of CHF, non-STEMI, PE, hypertension, multiple sclerosis, lymphedema, chronic kidney disease 2/3, NARCISA on CPAP, depression, morbid obesity BMI 55.96 kg/m .  Vital signs remarkable for intermittent tachycardia.  Laboratory work-up remarkable for WBC 13, sodium 134, potassium 3.2.  IV Zosyn and vancomycin due to sepsis criteria for abdominal wall cellulitis with multiple comorbidities.  At the time of admission with the information available to the attending physician more than 2 nights Hospital complex care was anticipated, based on patient risk of adverse outcome if treated as outpatient and complex care required. Inpatient admission is appropriate based on the Medicare guidelines.     This document was produced using voice recognition software     The information on this document is developed by the utilization review team in order for the business office to ensure compliance. This only denotes the appropriateness of proper admission status and does not reflect the quality of care rendered.   The definitions of Inpatient Status and Observation Status used in making the determination above are those provided in the CMS Coverage Manual, Chapter 1 and Chapter 6, section 70.4.     Sincerely,     MD TALHA [facility]  [place]  Utilization Review Physician Advisor  Pager: [phone]

## 2022-05-03 NOTE — PLAN OF CARE
Problem: Plan of Care - These are the overarching goals to be used throughout the patient stay.    Goal: Plan of Care Review/Shift Note  Description: The Plan of Care Review/Shift note should be completed every shift.  The Outcome Evaluation is a brief statement about your assessment that the patient is improving, declining, or no change.  This information will be displayed automatically on your shift note.  Outcome: Ongoing, Progressing     Patient is in bed resting comfortably. Hospital CPAP on. Denies pain, afebrile. Antibiotic IV therapy Zosyn, and vancomycin in progress. Pur wick in place draining zarina urine in collection wall. Turn and reposition by staff as needed it. Bilateral lower extremity red and edematous. Call light in reach. Will continue to monitor.

## 2022-05-03 NOTE — ED NOTES
"[facility] ED Handoff Report    ED Chief Complaint: Fever    ED Diagnosis:  (L03.311) Cellulitis of abdominal wall       PMH:    Past Medical History:   Diagnosis Date     Acute on chronic diastolic congestive heart failure (H) 2/9/2022     Arthritis 2019    Hips     ASCUS favor benign 8/2015    Neg HPV     Depressive disorder 2010     H/O colposcopy with cervical biopsy 9/14/15    Bx & ECC - negative     Hypertension 2019     LSIL on Pap smear 7/2014    Neg high risk HPV     Multiple sclerosis (H) 8/29/2005 9/18/200pt dx with MS 2004--dx by neurolgist and is followed by Dr. Steven Ayala 10/2016     UNSPEC CONSTIPATION 9/18/2006 9/18/2006   Has tried otc suppository and otc lax.         Code Status:  Full Code     Falls Risk: Yes Band: Applied    Current Living Situation/Residence: lives alone     Elimination Status: Continent: wears briefs when outside of home. Was incontinent today.     Activity Level: SBA    Patients Preferred Language:  English     Needed: No    Vital Signs:  /70   Pulse 94   Temp 98  F (36.7  C) (Temporal)   Resp 16   Ht 1.626 m (5' 4\")   Wt 147.9 kg (326 lb)   LMP 04/03/2022   SpO2 94%   BMI 55.96 kg/m       Cardiac Rhythm: NA    Pain Score: 0/10    Is the Patient Confused:  No    Last Food or Drink: 05/02/22 this morning    Focused Assessment: Alert and oriented. Denies pain. Afebrile since coming to hospital.  Hx MS. Cellulitis in pannus area. Warm to touch, reddened area marked. Is tender.     Tests Performed: Done: Labs and Imaging    Treatments Provided:  IV abx. Potassium 2.8; oral potassium given.     Family Dynamics/Concerns: No    Family Updated On Visitor Policy: No    Plan of Care Communicated to Family: Yes    Who Was Updated about Plan of Care: patient    Belongings Checklist Done and Signed by Patient: No    Medications sent with patient: NA    Covid: asymptomatic , negative    Additional Information:     RN: Christin Saunders RN   " 5/2/2022 7:09 PM

## 2022-05-04 ENCOUNTER — APPOINTMENT (OUTPATIENT)
Dept: PHYSICAL THERAPY | Facility: HOSPITAL | Age: 47
DRG: 872 | End: 2022-05-04
Attending: INTERNAL MEDICINE
Payer: COMMERCIAL

## 2022-05-04 ENCOUNTER — APPOINTMENT (OUTPATIENT)
Dept: OCCUPATIONAL THERAPY | Facility: HOSPITAL | Age: 47
DRG: 872 | End: 2022-05-04
Attending: INTERNAL MEDICINE
Payer: COMMERCIAL

## 2022-05-04 PROBLEM — E87.6 HYPOKALEMIA: Status: ACTIVE | Noted: 2022-05-04

## 2022-05-04 LAB
BACTERIA UR CULT: NORMAL
ERYTHROCYTE [DISTWIDTH] IN BLOOD BY AUTOMATED COUNT: 16.8 % (ref 10–15)
HCT VFR BLD AUTO: 35.9 % (ref 35–47)
HGB BLD-MCNC: 11.4 G/DL (ref 11.7–15.7)
MCH RBC QN AUTO: 27.7 PG (ref 26.5–33)
MCHC RBC AUTO-ENTMCNC: 31.8 G/DL (ref 31.5–36.5)
MCV RBC AUTO: 87 FL (ref 78–100)
PLATELET # BLD AUTO: 150 10E3/UL (ref 150–450)
RBC # BLD AUTO: 4.12 10E6/UL (ref 3.8–5.2)
WBC # BLD AUTO: 8.1 10E3/UL (ref 4–11)

## 2022-05-04 PROCEDURE — 97165 OT EVAL LOW COMPLEX 30 MIN: CPT | Mod: GO

## 2022-05-04 PROCEDURE — 250N000011 HC RX IP 250 OP 636: Performed by: INTERNAL MEDICINE

## 2022-05-04 PROCEDURE — 250N000011 HC RX IP 250 OP 636: Performed by: STUDENT IN AN ORGANIZED HEALTH CARE EDUCATION/TRAINING PROGRAM

## 2022-05-04 PROCEDURE — 120N000001 HC R&B MED SURG/OB

## 2022-05-04 PROCEDURE — 99232 SBSQ HOSP IP/OBS MODERATE 35: CPT | Performed by: INTERNAL MEDICINE

## 2022-05-04 PROCEDURE — 999N000157 HC STATISTIC RCP TIME EA 10 MIN

## 2022-05-04 PROCEDURE — 250N000013 HC RX MED GY IP 250 OP 250 PS 637: Performed by: INTERNAL MEDICINE

## 2022-05-04 PROCEDURE — 36415 COLL VENOUS BLD VENIPUNCTURE: CPT | Performed by: INTERNAL MEDICINE

## 2022-05-04 PROCEDURE — 97116 GAIT TRAINING THERAPY: CPT | Mod: GP

## 2022-05-04 PROCEDURE — 97162 PT EVAL MOD COMPLEX 30 MIN: CPT | Mod: GP

## 2022-05-04 PROCEDURE — 94660 CPAP INITIATION&MGMT: CPT

## 2022-05-04 PROCEDURE — 85027 COMPLETE CBC AUTOMATED: CPT | Performed by: INTERNAL MEDICINE

## 2022-05-04 PROCEDURE — 258N000003 HC RX IP 258 OP 636: Performed by: STUDENT IN AN ORGANIZED HEALTH CARE EDUCATION/TRAINING PROGRAM

## 2022-05-04 PROCEDURE — 97530 THERAPEUTIC ACTIVITIES: CPT | Mod: GO

## 2022-05-04 RX ORDER — CEFAZOLIN SODIUM 1 G/50ML
2000 SOLUTION INTRAVENOUS ONCE
Status: COMPLETED | OUTPATIENT
Start: 2022-05-04 | End: 2022-05-04

## 2022-05-04 RX ADMIN — MICONAZOLE NITRATE: 20 POWDER TOPICAL at 08:42

## 2022-05-04 RX ADMIN — GABAPENTIN 100 MG: 100 CAPSULE ORAL at 21:21

## 2022-05-04 RX ADMIN — BUMETANIDE 2 MG: 2 TABLET ORAL at 08:42

## 2022-05-04 RX ADMIN — SERTRALINE HYDROCHLORIDE 100 MG: 100 TABLET, FILM COATED ORAL at 08:42

## 2022-05-04 RX ADMIN — PIPERACILLIN AND TAZOBACTAM 3.38 G: 3; .375 INJECTION, POWDER, LYOPHILIZED, FOR SOLUTION INTRAVENOUS at 17:17

## 2022-05-04 RX ADMIN — CINACALCET 30 MG: 30 TABLET, FILM COATED ORAL at 08:42

## 2022-05-04 RX ADMIN — GABAPENTIN 100 MG: 100 CAPSULE ORAL at 08:42

## 2022-05-04 RX ADMIN — METOPROLOL SUCCINATE 25 MG: 25 TABLET, EXTENDED RELEASE ORAL at 08:42

## 2022-05-04 RX ADMIN — PIPERACILLIN AND TAZOBACTAM 3.38 G: 3; .375 INJECTION, POWDER, LYOPHILIZED, FOR SOLUTION INTRAVENOUS at 08:42

## 2022-05-04 RX ADMIN — RIVAROXABAN 20 MG: 10 TABLET, FILM COATED ORAL at 17:18

## 2022-05-04 RX ADMIN — VANCOMYCIN HYDROCHLORIDE 2000 MG: 5 INJECTION, POWDER, LYOPHILIZED, FOR SOLUTION INTRAVENOUS at 03:45

## 2022-05-04 RX ADMIN — MICONAZOLE NITRATE: 20 POWDER TOPICAL at 21:21

## 2022-05-04 RX ADMIN — GABAPENTIN 100 MG: 100 CAPSULE ORAL at 13:47

## 2022-05-04 RX ADMIN — PIPERACILLIN AND TAZOBACTAM 3.38 G: 3; .375 INJECTION, POWDER, LYOPHILIZED, FOR SOLUTION INTRAVENOUS at 00:41

## 2022-05-04 RX ADMIN — SEVELAMER CARBONATE 800 MG: 800 TABLET, FILM COATED ORAL at 08:42

## 2022-05-04 ASSESSMENT — ACTIVITIES OF DAILY LIVING (ADL)
ADLS_ACUITY_SCORE: 16
PREVIOUS_RESPONSIBILITIES: MEAL PREP;HOUSEKEEPING;LAUNDRY;SHOPPING;MEDICATION MANAGEMENT;FINANCES;DRIVING
ADLS_ACUITY_SCORE: 16

## 2022-05-04 NOTE — PLAN OF CARE
Problem: Plan of Care - These are the overarching goals to be used throughout the patient stay.    Goal: Optimal Comfort and Wellbeing  Outcome: Ongoing, Progressing  Intervention: Monitor Pain and Promote Comfort  Recent Flowsheet Documentation  Taken 5/3/2022 2004 by Jared Vernon RN  Pain Management Interventions: rest     Problem: Infection  Goal: Absence of Infection Signs and Symptoms  Outcome: Ongoing, Progressing    Goal Outcome Evaluation: Pt rated pain 2/10. Stated that pain on left leg when being rubbed or touch but at  rest, there is no pain. Pt did not want any medication. Adequate urine output. Ate 100% of food. On 1L O2 oxymask. Dangle leg at bedside for a couple minutes.

## 2022-05-04 NOTE — PROGRESS NOTES
Care Management Follow Up    Length of Stay (days): 1    Expected Discharge Date: 05/06/2022     Concerns to be Addressed:     Medical progression, Transporation  Patient plan of care discussed at interdisciplinary rounds: Yes    Anticipated Discharge Disposition:  Home with Home care     Anticipated Discharge Services:  None  Anticipated Discharge DME:  None    Patient/family educated on Medicare website which has current facility and service quality ratings:  N/A  Education Provided on the Discharge Plan:  N/A  Patient/Family in Agreement with the Plan:  N/A    Referrals Placed by CM/SW:  None  Private pay costs discussed: Not applicable    Additional Information:  Reached out to the pt as the MD was thinking she may need Home Care. After speaking to the pt she already has Home Care lined up with [facility], however she may need a ride at discharge, I gave her my name and number and said I would be happy to help her with that just let me know.  CM to follow for medical progression, recommendations and final discharge plan.      Kristy Patel RN

## 2022-05-04 NOTE — PROVIDER NOTIFICATION
Resident notify about blood culture. Gram positive cocci and clusters.     Resident responded back. Pt already on antibiotic. No new order.     Resident notify of positive MRSE

## 2022-05-04 NOTE — PROGRESS NOTES
05/04/22 1510   Quick Adds   Type of Visit Initial Occupational Therapy Evaluation   Living Environment   People in Home alone   Current Living Arrangements house  ([facility])   Home Accessibility no concerns   Transportation Anticipated family or friend will provide   Living Environment Comments pt can drive, but hasn't for a while due to medical issues.  Family and friends are involved providing assistance when needed.  Pt does her grocery shopping online   Self-Care   Usual Activity Tolerance moderate   Current Activity Tolerance moderate   Equipment Currently Used at Home walker, rolling;wheelchair, manual   Activity/Exercise/Self-Care Comment pt just completed her home care PT.  Therapist established a walking program for her to walk 3 min,  gets assistance with showering 1 x per week.  Pt showers other days on her own.   Instrumental Activities of Daily Living (IADL)   Previous Responsibilities meal prep;housekeeping;laundry;shopping;medication management;finances;driving   General Information   Onset of Illness/Injury or Date of Surgery 05/02/22   Referring Physician Scott   Patient/Family Therapy Goal Statement (OT) pt wants to return home at [place]   Existing Precautions/Restrictions other (see comments)  (leg and panus swelling and cellulitis)   Strength Comprehensive (MMT)   Comment, General Manual Muscle Testing (MMT) Assessment UE WNF.  Pt states her limits are just with her lower body   Bed Mobility   Bed Mobility supine-sit   Supine-Sit [place] (Bed Mobility) modified independence   Assistive Device (Bed Mobility) bed rails   Comment (Bed Mobility) with great effort and increase time and energy.  Pt uses recliner at home   Transfers   Transfers bed-chair transfer   Transfer Skill: Bed to Chair/Chair to Bed   Bed-Chair [place] (Transfers) supervision   Assistive Device (Bed-Chair Transfers) rolling walker   Sit-Stand Transfer   Sit-Stand [place] (Transfers) supervision   Assistive Device  (Sit-Stand Transfers) walker, standard   Clinical Impression   Criteria for Skilled Therapeutic Interventions Met (OT) Yes, treatment indicated   OT Diagnosis decrease ADLs   Influenced by the following impairments Cellulitis and MS   OT Problem List-Impairments impacting ADL problems related to;activity tolerance impaired;mobility   ADL comments/analysis inhibited function due to severe LE edema   Assessment of Occupational Performance 1-3 Performance Deficits   Identified Performance Deficits moblity, dressing   Planned Therapy Interventions (OT) ADL retraining;bed mobility training;manual therapy;transfer training   Clinical Decision Making Complexity (OT) low complexity   Risk & Benefits of therapy have been explained evaluation/treatment results reviewed;care plan/treatment goals reviewed;risks/benefits reviewed;current/potential barriers reviewed;participants voiced agreement with care plan;participants included;patient   OT Discharge Planning   OT Discharge Recommendation (DC Rec) home with home care occupational therapy   OT Rationale for DC Rec family, friends assist   Total Evaluation Time (Minutes)   Total Evaluation Time (Minutes) 16   OT Goals   Therapy Frequency (OT) Daily   OT Predicted Duration/Target Date for Goal Attainment 05/10/22   OT Goals Lower Body Dressing;Transfers;Bed Mobility   OT: Lower Body Dressing Modified independent   OT: Bed Mobility Modified independent   OT: Transfer Modified independent

## 2022-05-04 NOTE — PLAN OF CARE
Problem: Plan of Care - These are the overarching goals to be used throughout the patient stay.    Goal: Absence of Hospital-Acquired Illness or Injury  Intervention: Prevent Skin Injury  Recent Flowsheet Documentation  Taken 5/4/2022 0400 by Jared Vernon RN  Body Position: position changed independently  Taken 5/3/2022 2004 by Jared Vernon RN  Body Position: position changed independently     Problem: Infection  Goal: Absence of Infection Signs and Symptoms  5/4/2022 0456 by Jared Vernon RN  Outcome: Ongoing, Progressing  5/4/2022 0053 by Jared Vernon RN  Outcome: Ongoing, Progressing     Problem: Plan of Care - These are the overarching goals to be used throughout the patient stay.    Goal: Optimal Comfort and Wellbeing  Intervention: Monitor Pain and Promote Comfort  Recent Flowsheet Documentation  Taken 5/3/2022 2004 by Jared Vernon RN  Pain Management Interventions: rest     Goal Outcome Evaluation: Pt slept through the night with CPAP on. Blood culture came back positive for MRSE (methicillin resistence staphylococcus epidermidis). 1x dose of vancomycin running. Purewick in place. Adequate urine output. VSS. Pt stated that she has no pain when resting, only when she is moving her left leg.

## 2022-05-04 NOTE — PHARMACY-VANCOMYCIN DOSING SERVICE
"Pharmacy Vancomycin Initial Note  Date of Service May 4, 2022  Patient's  1975  46 year old, female  Indication: Bacteremia  GPC  Current estimated CrCl = Estimated Creatinine Clearance: 136.5 mL/min (based on SCr of 0.74 mg/dL).  Creatinine for last 3 days  2022:  2:15 PM Creatinine 0.96 mg/dL  5/3/2022:  6:10 AM Creatinine 0.74 mg/dL  Recent Vancomycin Level(s) for last 3 days  No results found for requested labs within last 72 hours.      Vancomycin IV Administrations (past 72 hours)                   vancomycin (VANCOCIN) 1000 mg in dextrose 5% 200 mL PREMIX (mg) 1,000 mg New Bag 22 0506    vancomycin (VANCOCIN) 2,000 mg in sodium chloride 0.9 % 500 mL intermittent infusion (mg) 2,000 mg New Bag 22 1726                Nephrotoxins and other renal medications (From now, onward)    Start     Dose/Rate Route Frequency Ordered Stop    22 2339  piperacillin-tazobactam (ZOSYN) 3.375 g vial to attach to  mL bag        Note to Pharmacy: Extended infusion dosing to start 6 hours after initial infusion.   \"Followed by\" Linked Group Details    3.375 g  over 240 Minutes Intravenous EVERY 8 HOURS 22 1739      22  bumetanide (BUMEX) tablet 2 mg         2 mg Oral DAILY 22 1938            Contrast Orders - past 72 hours (72h ago, onward)    None          IPlan:  1. Give vancomycin 2000mg x 1 for positive BC.   2. Verigene GP panel came  Back after consult;  positive for Staph Epi - cancel future vancomycin dosing per resident.   3.       Loan Palmer, [facility]    "

## 2022-05-04 NOTE — SIGNIFICANT EVENT
Significant Event Note    Time of event: 9:09 PM May 3, 2022    Description of event:  House paged about blood culture results -- gram positive cocci -- on zosyn for cellulitis of panus. No history of MRSA.     Plan:  -continue zosyn    Discussed with: bedside nurse    Alexandrea Ulloa MD    -added vancomycin given gram positive cocci growth    ADDENDUM:   Is staph epidermidis. Discussed with pharmacy, will discontinue vanc order.     Anson Álvarez DO  [facility] [place] PGY-2  Pager: [phone]

## 2022-05-04 NOTE — PROGRESS NOTES
[facility]    Hospitalist Progress Note    Assessment & Plan   46 year old female who was admitted on 5/2/2022 -- this is 4th admission in 2 months, and this time for abd wall pain and found to have abd wall cellulitis, and probable UTI:    Impression:   Principal Problem:    Sepsis    Cellulitis of abdominal wall (Panus)   -- IV Zosyn, will switch to PO Augmentin tomorrow      Urinary tract infection   -- UC with < 10K organisms      Hypokalemia   -- replaced, check in AM     Active Problems:    Multiple sclerosis       Morbid obesity -- BMI 56.0      Benign essential hypertension      NACRISA on CPAP      Recurrent UTI's    -- with check PVR with bladder scanning       Plan:  As above     DVT Prophylaxis: DOAC  Code Status: Full Code    Disposition: Expected discharge home tomorrow, with possible home care if needed.     Cody Liang MD  Pager [phone]  Cell Phone [phone]  Text Page (7am to 6pm)    Interval History   Mild lower abd pain, no dysuria -- but can not tell when she has a bladder infection.     Physical Exam   Temp: 99  F (37.2  C) Temp src: Axillary BP: 114/64 Pulse: 82   Resp: 20 SpO2: 96 % O2 Device: BiPAP/CPAP Oxygen Delivery: 1 LPM  Vitals:    05/02/22 1247   Weight: 147.9 kg (326 lb)     Vital Signs with Ranges  Temp:  [97.8  F (36.6  C)-101  F (38.3  C)] 99  F (37.2  C)  Pulse:  [] 82  Resp:  [18-20] 20  BP: (102-126)/(50-73) 114/64  SpO2:  [89 %-96 %] 96 %  I/O last 3 completed shifts:  In: 400 [P.O.:300; I.V.:100]  Out: 1925 [Urine:1925]    # Pain Assessment:  Current Pain Score 5/4/2022   Patient currently in pain? denies   Pain score (0-10) -   Pain location -   Pain descriptors -   Bess valles pain level was assessed and she currently denies pain.        Constitutional: Awake, alert, cooperative, no apparent distress  Respiratory: Clear to auscultation bilaterally, no crackles or wheezing  Cardiovascular: Regular rate and rhythm, normal S1 and S2,  and no murmur noted  GI: Normal bowel sounds, soft, obese with prominent panus and erythema and abd wall edema and mild tenderness   Extrem: No calf tenderness, 1+ bilateral ankle edema and left foot is red but not tender  Neuro: Ox3, left foot with spasms when touched, and bilateral leg weakness.     Medications       bumetanide  2 mg Oral Daily     cinacalcet  30 mg Oral Once per day on Mon Wed Fri     gabapentin  100 mg Oral TID     metoprolol succinate ER  25 mg Oral Daily     miconazole   Topical BID     piperacillin-tazobactam  3.375 g Intravenous Q8H     rivaroxaban ANTICOAGULANT  20 mg Oral Daily with supper     sertraline  100 mg Oral Daily     sevelamer carbonate  800 mg Oral Daily     sodium chloride (PF)  3 mL Intracatheter Q8H       Data   Recent Labs   Lab 05/04/22  0630 05/03/22  0610 05/02/22  1759 05/02/22  1415 05/02/22  1332   WBC 8.1 7.4  --   --  13.0*   HGB 11.4* 11.2*  --   --  12.4   MCV 87 86  --   --  86    155  --   --  192   NA  --  138  --  134*  --    POTASSIUM  --  3.4* 2.8* 3.2*  --    CHLORIDE  --  105  --  99  --    CO2  --  22  --  24  --    BUN  --  15  --  22  --    CR  --  0.74  --  0.96  --    ANIONGAP  --  11  --  11  --    EVITA  --  9.2  --  9.7  --    GLC  --  110  --  87  --        Imaging:   No results found for this or any previous visit (from the past 24 hour(s)).

## 2022-05-05 ENCOUNTER — MEDICAL CORRESPONDENCE (OUTPATIENT)
Dept: HEALTH INFORMATION MANAGEMENT | Facility: CLINIC | Age: 47
End: 2022-05-05
Payer: COMMERCIAL

## 2022-05-05 VITALS
HEIGHT: 64 IN | TEMPERATURE: 98.1 F | HEART RATE: 74 BPM | RESPIRATION RATE: 22 BRPM | BODY MASS INDEX: 50.02 KG/M2 | OXYGEN SATURATION: 96 % | WEIGHT: 293 LBS | DIASTOLIC BLOOD PRESSURE: 64 MMHG | SYSTOLIC BLOOD PRESSURE: 107 MMHG

## 2022-05-05 LAB
ANION GAP SERPL CALCULATED.3IONS-SCNC: 10 MMOL/L (ref 5–18)
BACTERIA BLD CULT: ABNORMAL
BACTERIA BLD CULT: ABNORMAL
BUN SERPL-MCNC: 15 MG/DL (ref 8–22)
CALCIUM SERPL-MCNC: 9.5 MG/DL (ref 8.5–10.5)
CHLORIDE BLD-SCNC: 105 MMOL/L (ref 98–107)
CO2 SERPL-SCNC: 26 MMOL/L (ref 22–31)
CREAT SERPL-MCNC: 0.89 MG/DL (ref 0.6–1.1)
GFR SERPL CREATININE-BSD FRML MDRD: 80 ML/MIN/1.73M2
GLUCOSE BLD-MCNC: 98 MG/DL (ref 70–125)
PLATELET # BLD AUTO: 168 10E3/UL (ref 150–450)
POTASSIUM BLD-SCNC: 3.4 MMOL/L (ref 3.5–5)
SODIUM SERPL-SCNC: 141 MMOL/L (ref 136–145)

## 2022-05-05 PROCEDURE — 999N000157 HC STATISTIC RCP TIME EA 10 MIN

## 2022-05-05 PROCEDURE — 94660 CPAP INITIATION&MGMT: CPT

## 2022-05-05 PROCEDURE — 36415 COLL VENOUS BLD VENIPUNCTURE: CPT | Performed by: INTERNAL MEDICINE

## 2022-05-05 PROCEDURE — 250N000013 HC RX MED GY IP 250 OP 250 PS 637: Performed by: INTERNAL MEDICINE

## 2022-05-05 PROCEDURE — 99239 HOSP IP/OBS DSCHRG MGMT >30: CPT | Performed by: INTERNAL MEDICINE

## 2022-05-05 PROCEDURE — 80048 BASIC METABOLIC PNL TOTAL CA: CPT | Performed by: INTERNAL MEDICINE

## 2022-05-05 PROCEDURE — 85049 AUTOMATED PLATELET COUNT: CPT | Performed by: INTERNAL MEDICINE

## 2022-05-05 RX ORDER — ACETAMINOPHEN 325 MG/1
650 TABLET ORAL EVERY 4 HOURS PRN
Refills: 0
Start: 2022-05-05 | End: 2023-12-21

## 2022-05-05 RX ORDER — POTASSIUM CHLORIDE 1500 MG/1
40 TABLET, EXTENDED RELEASE ORAL ONCE
Status: COMPLETED | OUTPATIENT
Start: 2022-05-05 | End: 2022-05-05

## 2022-05-05 RX ADMIN — POTASSIUM CHLORIDE 40 MEQ: 20 TABLET, EXTENDED RELEASE ORAL at 09:05

## 2022-05-05 RX ADMIN — BUMETANIDE 2 MG: 2 TABLET ORAL at 08:59

## 2022-05-05 RX ADMIN — SEVELAMER CARBONATE 800 MG: 800 TABLET, FILM COATED ORAL at 08:10

## 2022-05-05 RX ADMIN — AMOXICILLIN AND CLAVULANATE POTASSIUM 1 TABLET: 875; 125 TABLET, FILM COATED ORAL at 08:10

## 2022-05-05 RX ADMIN — METOPROLOL SUCCINATE 25 MG: 25 TABLET, EXTENDED RELEASE ORAL at 08:59

## 2022-05-05 RX ADMIN — MICONAZOLE NITRATE: 20 POWDER TOPICAL at 08:11

## 2022-05-05 RX ADMIN — SERTRALINE HYDROCHLORIDE 100 MG: 100 TABLET, FILM COATED ORAL at 08:10

## 2022-05-05 RX ADMIN — GABAPENTIN 100 MG: 100 CAPSULE ORAL at 08:10

## 2022-05-05 ASSESSMENT — ACTIVITIES OF DAILY LIVING (ADL)
WALKING_OR_CLIMBING_STAIRS: AMBULATION DIFFICULTY, REQUIRES EQUIPMENT;AMBULATION DIFFICULTY, ASSISTANCE 1 PERSON
TRANSFERRING: 1-->ASSISTANCE (EQUIPMENT/PERSON) NEEDED
BATHING: 1-->ASSISTANCE NEEDED
DRESS: 1-->ASSISTANCE (EQUIPMENT/PERSON) NEEDED
DRESSING/BATHING_DIFFICULTY: YES
ADLS_ACUITY_SCORE: 14
TRANSFERRING: 0-->ASSISTANCE NEEDED (DEVELOPMETNALLY APPROPRIATE)
ADLS_ACUITY_SCORE: 13
DIFFICULTY_EATING/SWALLOWING: NO
TOILETING_MANAGEMENT: PUREWICK
EQUIPMENT_CURRENTLY_USED_AT_HOME: WALKER, ROLLING;WHEELCHAIR, MANUAL
ADLS_ACUITY_SCORE: 13
CHANGE_IN_FUNCTIONAL_STATUS_SINCE_ONSET_OF_CURRENT_ILLNESS/INJURY: NO
ADLS_ACUITY_SCORE: 13
ADLS_ACUITY_SCORE: 14
TOILETING_ISSUES: YES
ADLS_ACUITY_SCORE: 13
DOING_ERRANDS_INDEPENDENTLY_DIFFICULTY: YES
FALL_HISTORY_WITHIN_LAST_SIX_MONTHS: NO
WEAR_GLASSES_OR_BLIND: NO
ADLS_ACUITY_SCORE: 13
DIFFICULTY_COMMUNICATING: NO
TOILETING_ASSISTANCE: TOILETING DIFFICULTY, ASSISTANCE 1 PERSON;TOILETING DIFFICULTY, REQUIRES EQUIPMENT
ADLS_ACUITY_SCORE: 13
ADLS_ACUITY_SCORE: 13
WALKING_OR_CLIMBING_STAIRS_DIFFICULTY: YES
ADLS_ACUITY_SCORE: 13
TOILETING: 1-->ASSISTANCE (EQUIPMENT/PERSON) NEEDED
CONCENTRATING,_REMEMBERING_OR_MAKING_DECISIONS_DIFFICULTY: NO
TOILETING: 0-->INDEPENDENT
DRESS: 0-->ASSISTANCE NEEDED (DEVELOPMENTALLY APPROPRIATE)

## 2022-05-05 NOTE — PROGRESS NOTES
Physical Therapy        05/04/22 1500   Quick Adds   Type of Visit Initial PT Evaluation   Living Environment   People in Home alone   Current Living Arrangements other (see comments)  ([facility])   Home Accessibility no concerns   Transportation Anticipated family or friend will provide   Self-Care   Equipment Currently Used at Home walker, rolling;wheelchair, manual   General Information   Onset of Illness/Injury or Date of Surgery 05/02/22   Referring Physician Cody Barbour MD   Patient/Family Therapy Goals Statement (PT) d/c home   Pertinent History of Current Problem (include personal factors and/or comorbidities that impact the POC) Cellulitis. PMH of hypertension, CHF, nephrolithiasis, PE on xarelto, NARCISA, multiple sclerosis, chronic lymphedema   Existing Precautions/Restrictions fall   Cognition   Affect/Mental Status (Cognition) WFL   Pain Assessment   Patient Currently in Pain Yes, see Vital Sign flowsheet   Integumentary/Edema   Integumentary/Edema Comments cellulitis to BLE and panniculus   Posture    Posture Not impaired   Range of Motion (ROM)   Range of Motion ROM is WFL   ROM Comment except L foot drop   Strength (Manual Muscle Testing)   Strength (Manual Muscle Testing) strength is WFL   Strength Comments except L foot drop   Transfers   Comment, (Transfers) sit<>stand, SBA   Gait/Stairs (Locomotion)   [place] Level (Gait) supervision   Assistive Device (Gait) walker, front-wheeled   Distance in Feet (Required for LE Total Joints) 15', 40'   Pattern (Gait) step-through   Deviations/Abnormal Patterns (Gait) base of support, wide;stride length decreased   Sensory Examination   Sensory Perception patient reports no sensory changes   Coordination   Coordination no deficits were identified   Muscle Tone   Muscle Tone no deficits were identified   Clinical Impression   Criteria for Skilled Therapeutic Intervention Yes, treatment indicated   PT Diagnosis (PT) difficulty walking   Influenced by the  following impairments poor activity tolerance, pain   Functional limitations due to impairments limited household mobility   Clinical Presentation (PT Evaluation Complexity) Stable/Uncomplicated   Clinical Presentation Rationale Pt presents as medically diagnosed.   Clinical Decision Making (Complexity) moderate complexity   Planned Therapy Interventions (PT) balance training;bed mobility training;gait training;home exercise program;neuromuscular re-education;patient/family education;strengthening;transfer training   Anticipated Equipment Needs at Discharge (PT) walker, rolling;wheelchair   Risk & Benefits of therapy have been explained evaluation/treatment results reviewed;participants voiced agreement with care plan;participants included;patient   PT Discharge Planning   PT Discharge Recommendation (DC Rec) home with assist   PT Rationale for DC Rec pt close to functional baseline, denies concerns   Plan of Care Review   Plan of Care Reviewed With parent   Total Evaluation Time   Total Evaluation Time (Minutes) 10   Physical Therapy Goals   PT Frequency Daily   PT Predicted Duration/Target Date for Goal Attainment 05/11/22   PT: Bed Mobility Modified independent;Supine to/from sit   PT: Transfers Modified independent;Sit to/from stand;Assistive device   PT: Gait Modified independent;Rolling walker  (75')       Liv Carey, [facility] 5/4/2022

## 2022-05-05 NOTE — DISCHARGE SUMMARY
[facility]    Discharge Summary  Hospitalist    Date of Admission:  5/2/2022  Date of Discharge:  5/5/2022  Discharging Provider: Cody Liang MD, MD    Discharge Diagnoses   Principal Problem:    Sepsis (Temp 101, , WBC 13.0)      Cellulitis of abdominal wall       Recurrent UTI's       Hypokalemia    Active Problems:    Multiple sclerosis       Morbid obesity -- BMI 56.0      Benign essential hypertension      NARCISA on CPAP        History of Present Illness   46 year old old female presenting with fever of 24-hour duration.  Denies pulmonary symptoms, no urinary symptoms, no chest pain or breathing difficulty, no diarrhea or abdominal pain.  She was found to have tenderness and erythema to her pannus anterior aspect.  She was admitted for treatment for abdominal wall cellulitis.    In ER, temp 99.8, , Potassium 2.8, WBC 13.0, UA with 32 WBC's.       Hospital Course   Treated with IV fluids, IV Zosyn and Vanco, then Vanco dropped and after 3 days was switched to oral Augmentin. Blood culture grew Staph Epi in 1 of 2 bottles consistent with contaminant.      Was seen by PT and OT, and home care resumed on discharge. UC with no growth.     Cody Liang MD  Pager: [phone]  Cell Phone:  [phone]       Significant Results and Procedures   As above    Pending Results   These results will be followed up by Dr. Liang  Unresulted Labs Ordered in the Past 30 Days of this Admission     Date and Time Order Name Status Description    5/2/2022  4:06 PM Blood Culture Line, venous Preliminary           Code Status   Full Code       Primary Care Physician   Mikala Luna    Physical Exam   Temp: 98.1  F (36.7  C) Temp src: Axillary BP: 107/64 Pulse: 74   Resp: 22 SpO2: 96 % O2 Device: BiPAP/CPAP    Vitals:    05/02/22 1247   Weight: 147.9 kg (326 lb)     Vital Signs with Ranges  Temp:  [97.9  F (36.6  C)-98.4  F (36.9  C)] 98.1  F (36.7  C)  Pulse:  []  74  Resp:  [20-22] 22  BP: (101-123)/(60-86) 107/64  SpO2:  [92 %-96 %] 96 %  I/O last 3 completed shifts:  In: 1506 [P.O.:1500; I.V.:6]  Out: 3300 [Urine:3300]    Exam on discharge:   Abd obese and mild redness    Discharge Disposition   Discharged to home  Condition at discharge: Stable    Consultations This Hospital Stay   PHARMACY TO DOSE VANCO  PHARMACY TO DOSE VANCO  PHARMACY TO DOSE VANCO  PHYSICAL THERAPY ADULT IP CONSULT  OCCUPATIONAL THERAPY ADULT IP CONSULT    Time Spent on this Encounter   I spent a total of 35 minutes discharging this patient.     Discharge Orders      Home Care Referral      Reason for your hospital stay    Cellulitis of abd wall.     Follow-up and recommended labs and tests     Follow up with primary care provider, Mikala Luna, as scheduled on 5/11/22, check UA and CBC and BMP then.     Activity    Your activity upon discharge: activity as tolerated -- try sleeping on your side if able, to help minimize swelling in abdominal wall (or turn from left to right side periodically if able).     Discharge Instructions    Call Dr. Barbour if any medical questions at Cell Phone [phone].     Diet    Follow this diet upon discharge: Orders Placed This Encounter       Healthy diet (minimize sweets and carbohydrates, eat fresh fruits and vegetables)     Discharge Medications   Current Discharge Medication List      START taking these medications    Details   amoxicillin-clavulanate (AUGMENTIN) 875-125 MG tablet Take 1 tablet by mouth every 12 hours for 5 days  Qty: 10 tablet, Refills: 0    Associated Diagnoses: Cellulitis of abdominal wall         CONTINUE these medications which have CHANGED    Details   acetaminophen (TYLENOL) 325 MG tablet Take 2 tablets (650 mg) by mouth every 4 hours as needed for mild pain  Refills: 0    Associated Diagnoses: Cellulitis of abdominal wall         CONTINUE these medications which have NOT CHANGED    Details   bumetanide (BUMEX) 2 MG tablet Take 1  tablet (2 mg) by mouth daily  Qty: 120 tablet, Refills: 3    Associated Diagnoses: Chronic diastolic congestive heart failure (H)      Cholecalciferol (VITAMIN D3 PO) Take 10,000 Units by mouth daily       cinacalcet (SENSIPAR) 30 MG tablet Take 1 tablet (30 mg) by mouth three times a week  Qty: 30 tablet, Refills: 0    Associated Diagnoses: Renal insufficiency      desogestrel-ethinyl estradiol (APRI) 0.15-30 MG-MCG tablet TAKE 1 TABLET DAILY CONTINUOUSLY-OMIT PLACEBPO TABS UNTIL AFTER 3 MONTHS OF HORMONE TABS  Qty: 112 tablet, Refills: 3    Associated Diagnoses: Polycystic ovaries; Encounter for surveillance of contraceptive pills      gabapentin (NEURONTIN) 100 MG capsule Take 1 capsule (100 mg) by mouth in the morning and 1 capsule (100 mg) at noon and 1 capsule (100 mg) in the evening.  Qty: 540 capsule, Refills: 1    Associated Diagnoses: Multiple sclerosis (H)      metoprolol succinate ER (TOPROL-XL) 25 MG 24 hr tablet TAKE 1 TABLET(25 MG) BY MOUTH DAILY  Qty: 90 tablet, Refills: 1    Comments: ZERO refills remain on this prescription. Your patient is requesting advance approval of refills for this medication to PREVENT ANY MISSED DOSES  Associated Diagnoses: Benign essential hypertension; Sinus tachycardia      miconazole (MICATIN) 2 % external powder Apply topically 2 times daily  Qty:      Associated Diagnoses: Morbid obesity (H)      rivaroxaban ANTICOAGULANT (XARELTO) 20 MG TABS tablet Take 1 tablet (20 mg) by mouth daily (with dinner)  Qty: 90 tablet, Refills: 0    Associated Diagnoses: Acute pulmonary embolism without acute cor pulmonale, unspecified pulmonary embolism type (H)      sertraline (ZOLOFT) 100 MG tablet Take 1 tablet (100 mg) by mouth daily  Qty: 90 tablet, Refills: 3    Associated Diagnoses: Moderate episode of recurrent major depressive disorder (H); Generalized anxiety disorder      sevelamer carbonate (RENVELA) 800 MG tablet Take 1 tablet (800 mg) by mouth in the morning.  Qty: 90  tablet, Refills: 0    Comments: With the biggest meal of the day.  Associated Diagnoses: Renal insufficiency      ACE/ARB/ARNI NOT PRESCRIBED (INTENTIONAL) Please choose reason not prescribed from choices below.    Associated Diagnoses: Chronic diastolic congestive heart failure (H)           Allergies   Allergies   Allergen Reactions     Nkda [No Known Drug Allergies]      Data   Most Recent 3 CBC's:Recent Labs   Lab Test 05/05/22  0631 05/04/22  0630 05/03/22  0610 05/02/22  1332   WBC  --  8.1 7.4 13.0*   HGB  --  11.4* 11.2* 12.4   MCV  --  87 86 86    150 155 192      Most Recent 3 BMP's:  Recent Labs   Lab Test 05/05/22  0631 05/03/22  0610 05/02/22  1759 05/02/22  1415    138  --  134*   POTASSIUM 3.4* 3.4* 2.8* 3.2*   CHLORIDE 105 105  --  99   CO2 26 22  --  24   BUN 15 15  --  22   CR 0.89 0.74  --  0.96   ANIONGAP 10 11  --  11   EVITA 9.5 9.2  --  9.7   GLC 98 110  --  87     Most Recent 2 LFT's:  Recent Labs   Lab Test 02/20/22 2122 02/08/22  1115   AST 31 18   ALT 19 12   ALKPHOS 66 40*   BILITOTAL 1.8* 2.3*     Most Recent INR's and Anticoagulation Dosing History:  Anticoagulation Dose History     Recent Dosing and Labs Latest Ref Rng & Units 4/19/2013 2/11/2022    INR 0.90 - 1.15 0.96 1.23(H)        Most Recent 3 Troponin's:  Recent Labs   Lab Test 03/04/21 2127   TROPI <0.015     Most Recent Cholesterol Panel:  Recent Labs   Lab Test 02/11/16  0942   CHOL 162   LDL 75   HDL 64   TRIG 113     Most Recent 6 Bacteria Isolates From Any Culture (See EPIC Reports for Culture Details):  Recent Labs   Lab Test 03/04/21  2304 03/04/21 2216 03/04/21 2127 02/25/20  1115 02/24/20  1325 02/24/20  1231   CULT 50,000 to 100,000 colonies/mL  Proteus mirabilis  *  10,000 to 50,000 colonies/mL  Strain 2  Proteus mirabilis  * Cultured on the 1st day of incubation:  Moraxella nonliquefaciens  Susceptibility testing done on previous specimen  *  Critical Value/Significant Value, preliminary result only,  called to and read back by  Joceline Holley RN 03/05/21 @2108 CARLOS    Cultured on the 2nd day of incubation:  Parvimonas micra  *  Critical Value/Significant Value, preliminary result only, called to and read back by  GILES ALBARADO RN 3.6.21 AT 1344. JL    Cultured on the 1st day of incubation:  Staphylococcus epidermidis  *  Critical Value/Significant Value called to and read back by  Ariel Robbins RN on 3.6.2021 at 1421. KVO    (Note)  NEGATIVE for the following: Staphylococcus spp., Staph aureus, Staph  epidermidis, Staph lugdunensis, Streptococcus spp., Strep pneumoniae,  Strep pyogenes, Strep agalactiae, Strep anginosus group, Enterococcus  faecalis, Enterococcus faecium, and Listeria spp. by Verigene  multiplex nucleic acid test. Final identification and antimicrobial  susceptibility testing will be verified by standard methods.    Critical Value/Significant Value called to and read back by Ariel Robbins RN 1627 03.06.2021 NM   Cultured on the 1st day of incubation:  Moraxella nonliquefaciens  Beta lactamase positive  *  Critical Value/Significant Value, preliminary result only, called to and read back by  YFN HOLLEY RN 03/05/21 1952 EH.    (Note)  NEGATIVE for the following organisms and resistance markers:  Acinetobacter sp., Citrobacter sp., Enterobacter sp., Proteus sp., E.  coli, K. pneumoniae/oxytoca, P. aeruginosa, CTX-M, KPC, NDM, VIM, IMP  and OXA by Verigene multiplex nucleic acid test. Final identification  and antimicrobial susceptibility testing will be verified by standard  methods.    Specimen tested with Verigene multiplex, gram-negative blood culture  nucleic acid test for the following targets: Acinetobacter sp.,  Citrobacter sp., Enterobacter sp., Proteus sp., E. coli, K.  pneumoniae/oxytoca, P. aeruginosa, and the following resistance  markers: CTXM, KPC, NDM, VIM, IMP and OXA.    Critical Value/Significant Value called to and read back by Bianca Hernandez RN at 2212 on 3.5.21 CW   No growth  Cultured on the 1st day of incubation:  Moraxella nonliquefaciens  Identification obtained by MALDI-TOF mass spectrometry research use only database. Test   characteristics determined and verified by the [facility]   ([facility]) [place], [place].  *  Critical Value/Significant Value, preliminary result only, called to and read back by  Alysia Ibarra RN 2/25/20 @ 0656 TF    (Note)  NEGATIVE for the following organisms and resistance markers:  Acinetobacter sp., Citrobacter sp., Enterobacter sp., Proteus sp., E.  coli, K. pneumoniae/oxytoca, P. aeruginosa, CTX-M, KPC, NDM, VIM, IMP  and OXA by 8 Securitiesigene multiplex nucleic acid test. Final identification  and antimicrobial susceptibility testing will be verified by standard  methods.    Critical Value/Significant Value called to and read back by Ibeth Ernandez RN 2/25/20 @0926    Cultured on the 1st day of incubation:  Moraxella nonliquefaciens  Identification obtained by MALDI-TOF mass spectrometry research use only database. Test   characteristics determined and verified by the [facility]   ([facility]) [place], [place].  *  Critical Value/Significant Value, preliminary result only, called to and read back by  Alysia Ibarra RN 2/25/20 @ 0656 TF    Susceptibility testing done on previous specimen     Most Recent TSH, T4 and A1c Labs:  Recent Labs   Lab Test 04/07/22  0754 02/13/20  1641   TSH 1.52  --    A1C  --  5.7*

## 2022-05-05 NOTE — PLAN OF CARE
Physical Therapy Discharge Summary    Reason for therapy discharge:    Discharged to home.    Progress towards therapy goal(s). See goals on Care Plan in [facility] electronic health record for goal details.  Goals partially met.  Barriers to achieving goals:   discharge from facility.    Therapy recommendation(s):    Continue home exercise program.

## 2022-05-05 NOTE — PROGRESS NOTES
Care Management Discharge Note    Discharge Date: 05/05/2022       Discharge Disposition: Home    Discharge Services:  Home care [facility]    Discharge DME:  None    Discharge Transportation: family or friend will provide    Private pay costs discussed: Not applicable    PAS Confirmation Code:    Patient/family educated on Medicare website which has current facility and service quality ratings:      Education Provided on the Discharge Plan: yes   Persons Notified of Discharge Plans: yes  Patient/Family in Agreement with the Plan: yes     Handoff Referral Completed: Yes    Additional Information:  Pt adequate for discharge. Family to transport. No further CM needs at this time        Kristy Patel RN

## 2022-05-05 NOTE — PLAN OF CARE
Goal Outcome Evaluation:    Plan of Care Reviewed With: patient     Overall Patient Progress: improving    Outcome Evaluation: discharge to home with home care    Patient discharged today a little after 1300. Friend providing transportation back home. Went over AVS including medications, follow up appointments and when to call the provider. Oral abx sent with patient from our pharmacy. Patient stable and ready for discharge. States she already has home care from before through [facility].     Lindsey Moyer RN 5/5/2022 2:24 PM

## 2022-05-05 NOTE — PLAN OF CARE
[id number]    Pt alert and oriented x4, VSS. Redness and warmth improving today in abdomen and BLE, as well as pain. Ambulated in [place] with PT, sat in the chair. Started her menses this afternoon. No other concerns.     Problem: Plan of Care - These are the overarching goals to be used throughout the patient stay.    Goal: Absence of Hospital-Acquired Illness or Injury  Outcome: Ongoing, Progressing  Intervention: Identify and Manage Fall Risk  Recent Flowsheet Documentation  Taken 5/4/2022 1630 by Jose Alberto Perkins, RN  Safety Promotion/Fall Prevention:   activity supervised   assistive device/personal items within reach   bed alarm on   clutter free environment maintained   fall prevention program maintained   nonskid shoes/slippers when out of bed   safety round/check completed  Taken 5/4/2022 0845 by Jose Alberto Perkins, RN  Safety Promotion/Fall Prevention:   activity supervised   assistive device/personal items within reach   bed alarm on   clutter free environment maintained   fall prevention program maintained   nonskid shoes/slippers when out of bed   safety round/check completed     Problem: Infection  Goal: Absence of Infection Signs and Symptoms  Outcome: Ongoing, Progressing   Goal Outcome Evaluation:

## 2022-05-05 NOTE — PLAN OF CARE
Problem: Plan of Care - These are the overarching goals to be used throughout the patient stay.    Goal: Optimal Comfort and Wellbeing  Outcome: Ongoing, Progressing     Problem: Infection  Goal: Absence of Infection Signs and Symptoms  Outcome: Ongoing, Progressing     Goal Outcome Evaluation: Pt pleasant and cooperative. Slept through the night with CPAP. Denies any pain. VSS.

## 2022-05-05 NOTE — PLAN OF CARE
Occupational Therapy Discharge Summary    Reason for therapy discharge:    Discharged to home.    Progress towards therapy goal(s). See goals on Care Plan in [facility] electronic health record for goal details.  Goals partially met.  Barriers to achieving goals:   discharge from facility.    Therapy recommendation(s):    Continued therapy is recommended.  Rationale/Recommendations:  pt would benefit from home therapy and increased services for independnece.

## 2022-05-06 ENCOUNTER — PATIENT OUTREACH (OUTPATIENT)
Dept: CARE COORDINATION | Facility: CLINIC | Age: 47
End: 2022-05-06
Payer: COMMERCIAL

## 2022-05-06 DIAGNOSIS — Z71.89 OTHER SPECIFIED COUNSELING: ICD-10-CM

## 2022-05-06 NOTE — PROGRESS NOTES
Clinic Care Coordination Contact  [facility]/Voicemail       Clinical Data: Care Coordinator Outreach  Outreach attempted x 1.  Left message on patient's voicemail with call back information and requested return call.  Plan: Care Coordinator will try to reach patient again in 1-2 business days.    Araseli Denny, [facility]  [phone]  [facility]

## 2022-05-07 ENCOUNTER — NURSE TRIAGE (OUTPATIENT)
Dept: NURSING | Facility: CLINIC | Age: 47
End: 2022-05-07
Payer: COMMERCIAL

## 2022-05-07 LAB — BACTERIA BLD CULT: NO GROWTH

## 2022-05-07 NOTE — TELEPHONE ENCOUNTER
Katelyn (Nurse with [facility]-in [place]) calls and says that Bess was discharged from [facility] on 5/5/2022 and says that pt. Will be seen tomorrow. Katelyn says that she needs an order for pt. To have : Skilled Nursing-1 visit and the nurse will call back after pt. Is assessed. Katelyn says that pt. Will also need PT, OT, and a Home Health Aid. Katelyn says that pt. Will need 3 visits and this will be evaluated after pt's home care visit. Katelyn  says that she can be called at: [phone]. Dr. Grewal-[facility]-was paged to call Katelyn at: [phone], via page  at [phone]. COVID 19 Nurse Triage Plan/Patient Instructions    Please be aware that novel coronavirus (COVID-19) may be circulating in the community. If you develop symptoms such as fever, cough, or SOB or if you have concerns about the presence of another infection including coronavirus (COVID-19), please contact your health care provider or visit [email or web address].     Disposition/Instructions    Home care recommended. Follow home care protocol based instructions.    Thank you for taking steps to prevent the spread of this virus.  o Limit your contact with others.  o Wear a simple mask to cover your cough.  o Wash your hands well and often.    Resources    [facility]: About COVID-19: [email or web address]    CDC: What to Do If You're Sick: www.cdc.gov/coronavirus/2019-ncov/about/steps-when-sick.html    CDC: Ending Home Isolation: www.cdc.gov/coronavirus/2019-ncov/hcp/disposition-in-home-patients.html     CDC: Caring for Someone: www.cdc.gov/coronavirus/2019-ncov/if-you-are-sick/care-for-someone.html     [facility]: Interim Guidance for Hospital Discharge to Home: www.health.[facility].[place]/diseases/coronavirus/hcp/hospdischarge.pdf    [facility] clinical trials (COVID-19 research studies): clinicalaffairs.[facility]-clinical-trials     Below are the COVID-19 hotlines  at the [place] Department of Health ([facility]). Interpreters are available.   o For health questions: Call 019-656-9126 or 1-751.201.9224 (7 a.m. to 7 p.m.)  o For questions about schools and childcare: Call 004-783-4057 or 1-156.918.8163 (7 a.m. to 7 p.m.)                   Reason for Disposition    Health Information question, no triage required and triager able to answer question    Additional Information    Negative: [1] Caller is not with the adult (patient) AND [2] reporting urgent symptoms    Negative: Lab result questions    Negative: Medication questions    Negative: Caller can't be reached by phone    Negative: Caller has already spoken to PCP or another triager    Negative: RN needs further essential information from caller in order to complete triage    Negative: Requesting regular office appointment    Negative: [1] Caller requesting NON-URGENT health information AND [2] PCP's office is the best resource    Protocols used: INFORMATION ONLY CALL - NO TRIAGE-A-

## 2022-05-07 NOTE — PROGRESS NOTES
"Clinic Care Coordination Contact  [facility]: Post-Discharge Note  SITUATION                                                      Admission:    Admission Date: 05/02/22   Reason for Admission: Cellulitis of abd wall.  Discharge:   Discharge Date: 05/05/22  Discharge Diagnosis: Cellulitis of abd wall.    BACKGROUND                                                      Per hospital discharge summary and inpatient provider notes:   46 year old old female presenting with fever of 24-hour duration.  Denies pulmonary symptoms, no urinary symptoms, no chest pain or breathing difficulty, no diarrhea or abdominal pain.  She was found to have tenderness and erythema to her pannus anterior aspect.  She was admitted for treatment for abdominal wall cellulitis.    ASSESSMENT      Enrollment  Primary Care Care Coordination Status: Declined    Discharge Assessment  How are you doing now that you are home?: \" I'm doing good thank you\"  How are your symptoms? (Red Flag symptoms escalate to triage hotline per guidelines): Improved  Do you feel your condition is stable enough to be safe at home until your provider visit?: Yes  Does the patient have their discharge instructions? : Yes  Does the patient have questions regarding their discharge instructions? : No  Were you started on any new medications or were there changes to any of your previous medications? : Yes  Does the patient have all of their medications?: Yes  Do you have questions regarding any of your medications? : No  Do you have all of your needed medical supplies or equipment (DME)?  (i.e. oxygen tank, CPAP, cane, etc.): Yes  Discharge follow-up appointment scheduled within 14 calendar days? : Yes  Discharge Follow Up Appointment Date: 05/11/22  Discharge Follow Up Appointment Scheduled with?: Primary Care Provider  Is patient agreeable to assistance with scheduling? : No    Post-op (W CTA Only)  If the patient had a surgery or procedure, do they have any " questions for a nurse?: No             PLAN                                                      Outpatient Plan:    Follow up with primary care provider, Mikala Luna, as scheduled on 5/11/22, check UA and CBC and BMP then.          Activity     Your activity upon discharge: activity as tolerated -- try sleeping on your side if able, to help minimize swelling in abdominal wall (or turn from left to right side periodically if able).          Discharge Instructions     Call Dr. Barbour if any medical questions at Cell Phone [phone].          Diet     Follow this diet upon discharge: Orders Placed This Encounter       Healthy diet (minimize sweets and carbohydrates, eat fresh fruits and vegetables)         Future Appointments   Date Time Provider Department Center   5/11/2022  2:40 PM Mikala Luna MD [facility]   5/13/2022 12:45 PM JN HC ECHO STAFF [facility]   5/17/2022  8:45 AM [facility] HCC CARDIOMEMS [facility]   5/17/2022  1:30 PM Onur Chaudhry MD [facility]   5/25/2022  1:30 PM Key Flanagan, APRN CNP [facility]   6/17/2022  9:50 AM [facility] HCC CARDIOMEMS [facility]   7/25/2022  1:30 PM Frank Farmer MD [facility]         For any urgent concerns, please contact our 24 hour nurse triage line: [phone] ([phone])         Kristy Calderon MA

## 2022-05-09 ENCOUNTER — TELEPHONE (OUTPATIENT)
Dept: FAMILY MEDICINE | Facility: CLINIC | Age: 47
End: 2022-05-09
Payer: COMMERCIAL

## 2022-05-09 ENCOUNTER — MEDICAL CORRESPONDENCE (OUTPATIENT)
Dept: HEALTH INFORMATION MANAGEMENT | Facility: CLINIC | Age: 47
End: 2022-05-09
Payer: COMMERCIAL

## 2022-05-09 NOTE — TELEPHONE ENCOUNTER
RN called and left verbal orders for approving the requesting orders below on confidential VM for Melissa.     Summer Lizarraga RN BSN PHN;

## 2022-05-09 NOTE — TELEPHONE ENCOUNTER
Routing to Provider to approve the requested orders below since there was a recent admission RN unable to give verbal without PCP approval first.     Summer Lizarraga RN BSN PHN

## 2022-05-09 NOTE — TELEPHONE ENCOUNTER
Reason for Call:  Home Health Care    Melissa with [facility] called regarding (reason for call): Please call with verbal orders - OK to leave detailed message    Orders are needed for this patient.     Skilled Nursing: Delay home care until 5/12, due to pt not being home and requesting this start date for resumption of care    Pt Provider: Jeremy    Phone Number Homecare Nurse can be reached at: [phone]    Can we leave a detailed message on this number? YES    Phone number patient can be reached at: Miller number on file [phone] (home)    Best Time: any    Call taken on 5/9/2022 at 2:44 PM by Jewels Stevens

## 2022-05-11 ENCOUNTER — MEDICAL CORRESPONDENCE (OUTPATIENT)
Dept: HEALTH INFORMATION MANAGEMENT | Facility: CLINIC | Age: 47
End: 2022-05-11

## 2022-05-11 ENCOUNTER — OFFICE VISIT (OUTPATIENT)
Dept: FAMILY MEDICINE | Facility: CLINIC | Age: 47
End: 2022-05-11
Payer: COMMERCIAL

## 2022-05-11 VITALS
RESPIRATION RATE: 18 BRPM | WEIGHT: 293 LBS | TEMPERATURE: 98.2 F | BODY MASS INDEX: 50.02 KG/M2 | SYSTOLIC BLOOD PRESSURE: 104 MMHG | OXYGEN SATURATION: 97 % | HEIGHT: 64 IN | HEART RATE: 91 BPM | DIASTOLIC BLOOD PRESSURE: 62 MMHG

## 2022-05-11 DIAGNOSIS — I89.0 SECONDARY LYMPHEDEMA: ICD-10-CM

## 2022-05-11 DIAGNOSIS — L03.311 CELLULITIS OF ABDOMINAL WALL: Primary | ICD-10-CM

## 2022-05-11 DIAGNOSIS — Z13.220 SCREENING FOR HYPERLIPIDEMIA: ICD-10-CM

## 2022-05-11 PROBLEM — J96.01 ACUTE RESPIRATORY FAILURE WITH HYPOXIA (H): Status: RESOLVED | Noted: 2022-02-09 | Resolved: 2022-05-11

## 2022-05-11 LAB
ANION GAP SERPL CALCULATED.3IONS-SCNC: 7 MMOL/L (ref 3–14)
BASOPHILS # BLD AUTO: 0.1 10E3/UL (ref 0–0.2)
BASOPHILS NFR BLD AUTO: 1 %
BUN SERPL-MCNC: 22 MG/DL (ref 7–30)
CALCIUM SERPL-MCNC: 9.8 MG/DL (ref 8.5–10.1)
CHLORIDE BLD-SCNC: 100 MMOL/L (ref 94–109)
CHOLEST SERPL-MCNC: 173 MG/DL
CO2 SERPL-SCNC: 29 MMOL/L (ref 20–32)
CREAT SERPL-MCNC: 0.76 MG/DL (ref 0.52–1.04)
EOSINOPHIL # BLD AUTO: 0.2 10E3/UL (ref 0–0.7)
EOSINOPHIL NFR BLD AUTO: 3 %
ERYTHROCYTE [DISTWIDTH] IN BLOOD BY AUTOMATED COUNT: 16.5 % (ref 10–15)
FASTING STATUS PATIENT QL REPORTED: NO
GFR SERPL CREATININE-BSD FRML MDRD: >90 ML/MIN/1.73M2
GLUCOSE BLD-MCNC: 87 MG/DL (ref 70–99)
HCT VFR BLD AUTO: 39.1 % (ref 35–47)
HDLC SERPL-MCNC: 27 MG/DL
HGB BLD-MCNC: 12.4 G/DL (ref 11.7–15.7)
IMM GRANULOCYTES # BLD: 0.1 10E3/UL
IMM GRANULOCYTES NFR BLD: 1 %
LDLC SERPL CALC-MCNC: 89 MG/DL
LYMPHOCYTES # BLD AUTO: 1.2 10E3/UL (ref 0.8–5.3)
LYMPHOCYTES NFR BLD AUTO: 14 %
MCH RBC QN AUTO: 27.3 PG (ref 26.5–33)
MCHC RBC AUTO-ENTMCNC: 31.7 G/DL (ref 31.5–36.5)
MCV RBC AUTO: 86 FL (ref 78–100)
MONOCYTES # BLD AUTO: 0.7 10E3/UL (ref 0–1.3)
MONOCYTES NFR BLD AUTO: 8 %
NEUTROPHILS # BLD AUTO: 6.3 10E3/UL (ref 1.6–8.3)
NEUTROPHILS NFR BLD AUTO: 74 %
NONHDLC SERPL-MCNC: 146 MG/DL
PLATELET # BLD AUTO: 206 10E3/UL (ref 150–450)
POTASSIUM BLD-SCNC: 3.4 MMOL/L (ref 3.4–5.3)
RBC # BLD AUTO: 4.54 10E6/UL (ref 3.8–5.2)
SODIUM SERPL-SCNC: 136 MMOL/L (ref 133–144)
TRIGL SERPL-MCNC: 285 MG/DL
WBC # BLD AUTO: 8.5 10E3/UL (ref 4–11)

## 2022-05-11 PROCEDURE — 80048 BASIC METABOLIC PNL TOTAL CA: CPT | Performed by: FAMILY MEDICINE

## 2022-05-11 PROCEDURE — 99495 TRANSJ CARE MGMT MOD F2F 14D: CPT | Performed by: FAMILY MEDICINE

## 2022-05-11 PROCEDURE — 80061 LIPID PANEL: CPT | Performed by: FAMILY MEDICINE

## 2022-05-11 PROCEDURE — 85025 COMPLETE CBC W/AUTO DIFF WBC: CPT | Performed by: FAMILY MEDICINE

## 2022-05-11 PROCEDURE — 36415 COLL VENOUS BLD VENIPUNCTURE: CPT | Performed by: FAMILY MEDICINE

## 2022-05-11 ASSESSMENT — PAIN SCALES - GENERAL: PAINLEVEL: NO PAIN (0)

## 2022-05-11 NOTE — PROGRESS NOTES
"  Assessment & Plan     Cellulitis of abdominal wall  Improving  There seems to be a little edema of this area, but the redness has receded from the outlined area  Continue to monitor  She has completed her antibiotic, no need for further antibiotic at this time.   - CBC with platelets and differential; Future  - Basic metabolic panel  (Ca, Cl, CO2, Creat, Gluc, K, Na, BUN); Future  - CBC with platelets and differential  - Basic metabolic panel  (Ca, Cl, CO2, Creat, Gluc, K, Na, BUN)    Screening for hyperlipidemia     - Lipid panel reflex to direct LDL Non-fasting; Future  - ASPIRIN NOT PRESCRIBED (INTENTIONAL); Please choose reason not prescribed from choices below.  - Lipid panel reflex to direct LDL Non-fasting    Secondary lymphedema  Continue with lymphedema therapy               BMI:   Estimated body mass index is 55.53 kg/m  as calculated from the following:    Height as of this encounter: 1.626 m (5' 4\").    Weight as of this encounter: 146.7 kg (323 lb 8 oz).           No follow-ups on file.    Mikala Luna MD  [facility]    Cornell Kellogg is a 46 year old who presents for the following health issues.    HPI     Post Discharge Outreach 5/7/2022   Admission Date 5/2/2022   Reason for Admission Cellulitis of abd wall.   Discharge Date 5/5/2022   Discharge Diagnosis Cellulitis of abd wall.   How are you doing now that you are home? \" I'm doing good thank you\"   How are your symptoms? (Red Flag symptoms escalate to triage hotline per guidelines) Improved   Do you feel your condition is stable enough to be safe at home until your provider visit? Yes   Does the patient have their discharge instructions?  Yes   Does the patient have questions regarding their discharge instructions?  No   Were you started on any new medications or were there changes to any of your previous medications?  Yes   Does the patient have all of their medications? Yes   Do you have questions regarding any " "of your medications?  No   Do you have all of your needed medical supplies or equipment (DME)?  (i.e. oxygen tank, CPAP, cane, etc.) Yes   Discharge follow-up appointment scheduled within 14 calendar days?  Yes   Discharge Follow Up Appointment Date 5/11/2022   Discharge Follow Up Appointment Scheduled with? Primary Care Provider     Hospital Follow-up Visit:    Hospital/Nursing Home/IP Rehab Facility: [facility]  Date of Admission: 5/2/2022  Date of Discharge: 5/05/2022  Reason(s) for Admission: Sepsis      Was your hospitalization related to COVID-19? No   Problems taking medications regularly:  None  Medication changes since discharge: None  Problems adhering to non-medication therapy:  None    Summary of hospitalization:  [facility] discharge summary reviewed  Diagnostic Tests/Treatments reviewed.  Follow up needed: none  Other Healthcare Providers Involved in Patient s Care:         Homecare  Update since discharge:stable/improved.   Post Discharge Medication Reconciliation: discharge medications reconciled, continue medications without change.  Plan of care communicated with patient          Hospitalized with abdominal wall cellulitis    1 of 2 positive blood cultures - contaminant  Possible UTI but the UCX was negative - no infection    Review of Systems   Constitutional, HEENT, cardiovascular, pulmonary, gi and gu systems are negative, except as otherwise noted.      Objective    /62   Pulse 91   Temp 98.2  F (36.8  C) (Tympanic)   Resp 18   Ht 1.626 m (5' 4\")   Wt 146.7 kg (323 lb 8 oz)   LMP 04/03/2022   SpO2 97%   Breastfeeding No   BMI 55.53 kg/m    Body mass index is 55.53 kg/m .  Physical Exam   GENERAL: healthy, alert and no distress  NECK: no adenopathy, no asymmetry, masses, or scars and thyroid normal to palpation  RESP: lungs clear to auscultation - no rales, rhonchi or wheezes  CV: regular rate and rhythm, normal S1 S2, no S3 or S4, no " murmur, click or rub, no peripheral edema and peripheral pulses strong  ABDOMEN: soft, nontender, no hepatosplenomegaly, no masses and bowel sounds normal  SKIN: edema of the right pannus.  There is minimal erythema and it has receded from the outlined area    Results for orders placed or performed in visit on 05/11/22   CBC with platelets and differential     Status: Abnormal   Result Value Ref Range    WBC Count 8.5 4.0 - 11.0 10e3/uL    RBC Count 4.54 3.80 - 5.20 10e6/uL    Hemoglobin 12.4 11.7 - 15.7 g/dL    Hematocrit 39.1 35.0 - 47.0 %    MCV 86 78 - 100 fL    MCH 27.3 26.5 - 33.0 pg    MCHC 31.7 31.5 - 36.5 g/dL    RDW 16.5 (H) 10.0 - 15.0 %    Platelet Count 206 150 - 450 10e3/uL    % Neutrophils 74 %    % Lymphocytes 14 %    % Monocytes 8 %    % Eosinophils 3 %    % Basophils 1 %    % Immature Granulocytes 1 %    Absolute Neutrophils 6.3 1.6 - 8.3 10e3/uL    Absolute Lymphocytes 1.2 0.8 - 5.3 10e3/uL    Absolute Monocytes 0.7 0.0 - 1.3 10e3/uL    Absolute Eosinophils 0.2 0.0 - 0.7 10e3/uL    Absolute Basophils 0.1 0.0 - 0.2 10e3/uL    Absolute Immature Granulocytes 0.1 <=0.4 10e3/uL   CBC with platelets and differential     Status: Abnormal    Narrative    The following orders were created for panel order CBC with platelets and differential.  Procedure                               Abnormality         Status                     ---------                               -----------         ------                     CBC with platelets and d...[[id number]]  Abnormal            Final result                 Please view results for these tests on the individual orders.

## 2022-05-11 NOTE — PATIENT INSTRUCTIONS
Health Maintenance reviewed and plan for update discussed.  Patient asked to schedule her pap smear  Patient asked to schedule her mammogram  Patient asked to schedule colonoscopy     Our Clinic hours are:  Mondays    7:20 am - 7 pm  Tues -  Fri  7:20 am - 5 pm    Clinic Phone: [phone]    The clinic lab opens at 7:30 am Mon - Fri and appointments are required.    [facility]. [phone]  Monday  8 am - 7pm  Tues - Fri 8 am - 5:30 pm

## 2022-05-12 NOTE — RESULT ENCOUNTER NOTE
Bess,    All of the labs were normal or acceptable.      The triglycerides are high. Lowering  the amount of sugar/carbohydrates, alcohol and sweets in the diet helps to control this. Exercise and weight loss helps.  They are not high enough to consider therapy with medicine.      Please contact my office if you have questions.    Mikala Luna M.D.

## 2022-05-13 ENCOUNTER — HOSPITAL ENCOUNTER (OUTPATIENT)
Dept: CARDIOLOGY | Facility: HOSPITAL | Age: 47
Discharge: HOME | End: 2022-05-13
Attending: INTERNAL MEDICINE | Admitting: INTERNAL MEDICINE
Payer: COMMERCIAL

## 2022-05-13 DIAGNOSIS — I26.93 SINGLE SUBSEGMENTAL PULMONARY EMBOLISM WITHOUT ACUTE COR PULMONALE (H): ICD-10-CM

## 2022-05-13 DIAGNOSIS — G47.33 OSA ON CPAP: ICD-10-CM

## 2022-05-13 DIAGNOSIS — I51.89 MODERATE RIGHT VENTRICULAR SYSTOLIC DYSFUNCTION: ICD-10-CM

## 2022-05-13 PROCEDURE — 255N000002 HC RX 255 OP 636: Performed by: INTERNAL MEDICINE

## 2022-05-13 PROCEDURE — 93306 TTE W/DOPPLER COMPLETE: CPT | Mod: 26 | Performed by: INTERNAL MEDICINE

## 2022-05-13 RX ADMIN — PERFLUTREN 4 ML: 6.52 INJECTION, SUSPENSION INTRAVENOUS at 13:30

## 2022-05-16 ENCOUNTER — MEDICAL CORRESPONDENCE (OUTPATIENT)
Dept: HEALTH INFORMATION MANAGEMENT | Facility: CLINIC | Age: 47
End: 2022-05-16
Payer: COMMERCIAL

## 2022-05-17 ENCOUNTER — TELEPHONE (OUTPATIENT)
Dept: FAMILY MEDICINE | Facility: CLINIC | Age: 47
End: 2022-05-17

## 2022-05-17 ENCOUNTER — ALLIED HEALTH/NURSE VISIT (OUTPATIENT)
Dept: CARDIOLOGY | Facility: CLINIC | Age: 47
End: 2022-05-17
Payer: COMMERCIAL

## 2022-05-17 ENCOUNTER — OFFICE VISIT (OUTPATIENT)
Dept: PULMONOLOGY | Facility: OTHER | Age: 47
End: 2022-05-17
Attending: INTERNAL MEDICINE
Payer: COMMERCIAL

## 2022-05-17 ENCOUNTER — TELEPHONE (OUTPATIENT)
Dept: CARDIOLOGY | Facility: CLINIC | Age: 47
End: 2022-05-17

## 2022-05-17 VITALS
SYSTOLIC BLOOD PRESSURE: 111 MMHG | HEIGHT: 64 IN | DIASTOLIC BLOOD PRESSURE: 69 MMHG | WEIGHT: 293 LBS | OXYGEN SATURATION: 96 % | BODY MASS INDEX: 50.02 KG/M2 | HEART RATE: 105 BPM

## 2022-05-17 DIAGNOSIS — I51.89 MODERATE RIGHT VENTRICULAR SYSTOLIC DYSFUNCTION: ICD-10-CM

## 2022-05-17 DIAGNOSIS — I26.99 ACUTE PULMONARY EMBOLISM WITHOUT ACUTE COR PULMONALE, UNSPECIFIED PULMONARY EMBOLISM TYPE (H): ICD-10-CM

## 2022-05-17 DIAGNOSIS — I50.32 CHRONIC DIASTOLIC CONGESTIVE HEART FAILURE (H): Primary | ICD-10-CM

## 2022-05-17 DIAGNOSIS — J96.01 ACUTE RESPIRATORY FAILURE WITH HYPOXIA (H): ICD-10-CM

## 2022-05-17 PROCEDURE — 99457 RPM TX MGMT 1ST 20 MIN: CPT | Performed by: INTERNAL MEDICINE

## 2022-05-17 PROCEDURE — 99204 OFFICE O/P NEW MOD 45 MIN: CPT | Performed by: INTERNAL MEDICINE

## 2022-05-17 PROCEDURE — 99207 PR NO CHARGE LOS: CPT | Performed by: INTERNAL MEDICINE

## 2022-05-17 PROCEDURE — 99454 REM MNTR PHYSIOL PARAM 16-30: CPT | Performed by: INTERNAL MEDICINE

## 2022-05-17 RX ORDER — BUMETANIDE 2 MG/1
2 TABLET ORAL 2 TIMES DAILY
Qty: 120 TABLET | Refills: 3 | Status: SHIPPED | OUTPATIENT
Start: 2022-05-17 | End: 2022-08-25

## 2022-05-17 NOTE — PROGRESS NOTES
Pulmonary Clinic Outpatient Consultation    Assessment and Plan:   46 year old female never smoker with a history of severe NARCISA on CPAP with possible associated pulmonary HTN, HFpEF, obesity, depression/anxiety, COVID-19 positive in December 2021 and February 2022, tiny asymptomatic pulmonary embolism in February 2022, PCOS previously on estrogen (currently on hold), RLS, right leg and abdominal wall cellulitis, recurrent UTIs, presenting for evaluation of pulmonary embolism.    Pulmonary embolism: Tiny asymptomatic right-sided pulmonary embolism in early February 2022 in the context of recent COVID-19 infection. Asymptomatic. No FHx of VTE, and this was a first-time episode for the patient. Some epistaxis and easy bruising on the rivaroxaban. Likely pulmonary HTN though this is likely WHO group 3 due to NARCISA and unrelated to the tiny PE. As the PE was likely related to recent COVID-19 infection, and as she is a nonsmoker, okay to stop rivaroxaban as she has completed over 3 months of therapy, and okay to restart estrogen, though did discuss that she is at a somewhat increased risk of recurrent VTE, and if recurrence were to occur, indefinite anticoagulation would be recommended.    Plan:  - stop rivaroxaban  - okay to restart estrogen  - advised of somewhat increased risk of recurrent VTE; if recurs, indefinite anticoagulation would be recommended  - encouraged her to contact me with questions or concerning symptoms    Onur Chaudhry MD  [facility]  Office [phone]  Pager [phone]  (he/him/his)    CCx: pulmonary embolism    HPI: 46 year old female never smoker with a history of severe NARCISA on CPAP with possible associated pulmonary HTN, HFpEF, obesity, depression/anxiety, COVID-19 positive in December 2021 and February 2022, tiny asymptomatic pulmonary embolism in February 2022, PCOS previously on estrogen (currently on hold), RLS, right leg and abdominal wall cellulitis, recurrent UTIs,  presenting for evaluation of pulmonary embolism. Some chronic exertional dyspnea but stable. No chest pain or pressure. No FHx of VTE. Occasional epistaxis, easy bruising. Would like to restart estrogen for PCOS. Has been on rivaroxaban for over 3 months.    ROS:  A 12-system review was obtained and was negative with the exception of the symptoms endorsed in the history of present illness.    PMH:  Past Medical History:   Diagnosis Date     Acute on chronic diastolic congestive heart failure (H) 2/9/2022     Arthritis 2019    Hips     ASCUS favor benign 8/2015    Neg HPV     Depressive disorder 2010     H/O colposcopy with cervical biopsy 9/14/15    Bx & ECC - negative     Hypertension 2019     LSIL on Pap smear 7/2014    Neg high risk HPV     Multiple sclerosis (H) 8/29/2005 9/18/200pt dx with MS 2004--dx by neurolgist and is followed by Dr. Steven Ayala 10/2016     UNSPEC CONSTIPATION 9/18/2006 9/18/2006   Has tried otc suppository and otc lax.        PSH:  Past Surgical History:   Procedure Laterality Date     CHOLECYSTECTOMY, LAPOROSCOPIC      Cholecystectomy, Laparoscopic     COLONOSCOPY  2007?    Bathroom issue..results normal     COMBINED CYSTOSCOPY, RETROGRADES, EXCHANGE STENT URETER(S) Right 2/21/2022    Procedure: CYSTOSCOPY, WITH right RETROGRADE PYELOGRAM AND right URETERAL STENT PLACEMENT;  Surgeon: Doyle Palomares MD;  [place] [facility] [place]     OPEN REDUCTION INTERNAL FIXATION TOE(S)  4/13/2012    Procedure:OPEN REDUCTION INTERNAL FIXATION TOE(S); Open reduction internal fixation right proximal fifth metatarsal fracture-   Anes-choice block; Surgeon:LEY, JEFFREY DUANE; Location:[place] OR     PICC TRIPLE LUMEN PLACEMENT  2/21/2022            Allergies:  Allergies   Allergen Reactions     Nkda [No Known Drug Allergies]        Family HX:  Family History   Problem Relation Age of Onset     Neurologic Disorder Father         MS     Heart Disease Father         irregular heart rate      Diabetes Father      Melanoma Father      Sleep Apnea Father      Other Cancer Father      Cancer Maternal Grandfather         lung     Other Cancer Maternal Grandfather      Cancer Paternal Grandmother         stomach     Other Cancer Paternal Grandmother      Cancer Mother      Other Cancer Mother      Thyroid Disease Mother      Gynecology Maternal Aunt         PCOS     Hypertension Maternal Grandmother      Anxiety Disorder Maternal Grandmother      Thyroid Disease Maternal Grandmother      Anxiety Disorder Sister        Social Hx:  Social History     Socioeconomic History     Marital status: Single     Spouse name: Not on file     Number of children: Not on file     Years of education: Not on file     Highest education level: Not on file   Occupational History     Employer: [employer]   Tobacco Use     Smoking status: Never Smoker     Smokeless tobacco: Never Used   Vaping Use     Vaping Use: Never used   Substance and Sexual Activity     Alcohol use: Yes     Comment: social     Drug use: No     Sexual activity: Not Currently     Partners: Male     Birth control/protection: Pill   Other Topics Concern     Parent/sibling w/ CABG, MI or angioplasty before 65F 55M? No   Social History Narrative    , 3rd-5th grade     Social Determinants of Health     Financial Resource Strain: Low Risk      Difficulty of Paying Living Expenses: Not very hard   Food Insecurity: No Food Insecurity     Worried About Running Out of Food in the Last Year: Never true     Ran Out of Food in the Last Year: Never true   Transportation Needs: No Transportation Needs     Lack of Transportation (Medical): No     Lack of Transportation (Non-Medical): No   Physical Activity: Inactive     Days of Exercise per Week: 0 days     Minutes of Exercise per Session: 0 min   Stress: Not on file   Social Connections: Unknown     Frequency of Communication with Friends and Family: Twice a week     Frequency of Social  Gatherings with Friends and Family: Twice a week     Attends [employer] Services: Not on file     Active Member of Clubs or Organizations: Not on file     Attends Club or Organization Meetings: Not on file     Marital Status: Not on file   Intimate Partner Violence: Not At Risk     Fear of Current or Ex-Partner: No     Emotionally Abused: No     Physically Abused: No     Sexually Abused: No   Housing Stability: Not on file       Current Meds:  Current Outpatient Medications   Medication Sig Dispense Refill     ACE/ARB/ARNI NOT PRESCRIBED (INTENTIONAL) Please choose reason not prescribed from choices below.       acetaminophen (TYLENOL) 325 MG tablet Take 2 tablets (650 mg) by mouth every 4 hours as needed for mild pain  0     ASPIRIN NOT PRESCRIBED (INTENTIONAL) Please choose reason not prescribed from choices below.       Cholecalciferol (VITAMIN D3 PO) Take 10,000 Units by mouth daily        cinacalcet (SENSIPAR) 30 MG tablet Take 1 tablet (30 mg) by mouth three times a week 30 tablet 0     desogestrel-ethinyl estradiol (APRI) 0.15-30 MG-MCG tablet TAKE 1 TABLET DAILY CONTINUOUSLY-OMIT PLACEBPO TABS UNTIL AFTER 3 MONTHS OF HORMONE TABS 112 tablet 3     gabapentin (NEURONTIN) 100 MG capsule Take 1 capsule (100 mg) by mouth in the morning and 1 capsule (100 mg) at noon and 1 capsule (100 mg) in the evening. 540 capsule 1     metoprolol succinate ER (TOPROL-XL) 25 MG 24 hr tablet TAKE 1 TABLET(25 MG) BY MOUTH DAILY 90 tablet 1     miconazole (MICATIN) 2 % external powder Apply topically 2 times daily (Patient taking differently: Apply topically 2 times daily as needed)       sertraline (ZOLOFT) 100 MG tablet Take 1 tablet (100 mg) by mouth daily 90 tablet 3     sevelamer carbonate (RENVELA) 800 MG tablet Take 1 tablet (800 mg) by mouth in the morning. 90 tablet 0     bumetanide (BUMEX) 2 MG tablet Take 1 tablet (2 mg) by mouth 2 times daily 120 tablet 3       Physical Exam:  /69   Pulse 105   Ht 1.626 m (5'  "4\")   Wt (!) 150.1 kg (331 lb)   LMP 04/03/2022   SpO2 96%   BMI 56.82 kg/m    Gen: alert, oriented, no distress  HEENT: no cervical or supraclavicular lymphadenopathy  CV: tachy, regular, no M/G/R  Resp: CTAB, no focal crackles or wheezes  Skin: no apparent rashes  Ext: no cyanosis, clubbing or edema  Neuro: alert, nonfocal    Labs:  reviewed    Imaging studies:  Chest CTA (February 2022):  - images directly reviewed, formal interpretation follows:  FINDINGS:  ANGIOGRAM CHEST: Enlarged main pulmonary artery is 4 cm diameter consistent with pulmonary arterial hypertension. Small nonocclusive thrombus right upper lobe pulmonary artery (series 5, image 66). No additional thrombi seen.     Thoracic aorta is negative for dissection. RV/LV ratio abnormal, greater than 1.0.     LUNGS AND PLEURA: Diffuse lung mosaic attenuation, similar to previous which can be seen with pulmonary arterial hypertension. No right upper and middle lobe groundglass/consolidative infiltrates suggesting an acute pneumonia. No pleural effusion.     MEDIASTINUM/AXILLAE: No adenopathy.     CORONARY ARTERY CALCIFICATION: None.     UPPER ABDOMEN: Absent gallbladder     MUSCULOSKELETAL: Upper chest wall venous collaterals. Spinal degenerative change.                                                                      IMPRESSION:  1.  Small nonocclusive right upper lobe pulmonary embolism.  2.  Pulmonary arterial hypertension.  3.  Right heart dysfunction.  4.  New right lung infiltrates compatible with pneumonia.    TTE (May 2022):    1. Technically difficult study despite utilization of ultrasound enhancing  agent.  2. The left ventricle is normal in size. Image quality does not provide for  detailed assessment of LV systolic function, but is felt to be normal with a  visually estimated ejection fraction of roughly 60%.  3. There is abnormal septal motion c/w right ventricular overload; there is  diastolic flattening of the septum with a more " normal configuration at end  systole.  4. There is mild concentric increase in left ventricular wall thickness.  5. No significant valvular heart disease is identified on this study.  6. Probable mild right ventricular enlargement with moderate reduction right  ventricular systolic performance (the right ventricle is very poorly  visualized on the study).  7. There is mild left atrial enlargement. There is borderline right atrial  enlargement.  8. Right ventricular systolic pressure relative to right atrial pressure is  moderately increased. The pulmonary artery pressure is estimated to be 60-65  mmHg plus right atrial pressure (the IVC is moderately dilated).     When compared to the prior real-time echocardiogram dated 9 February 2022,  right ventricular systolic pressure relative to right atrial pressure is now  measured to be moderately increased (could not be assessed on prior study).  The findings are otherwise are felt to be fairly similar on both examinations.  Accurate comparison, however, is quite difficult due to the suboptimal  acoustic imaging not only on the current examination, but also the previous.     Additional comments: The acoustic quality of this study is quite poor. If a  more accurate assessment of left & right ventricular systolic performance,  regional wall motion, and other morphology/function is required; would  recommend cardiac MRI or other alternative imaging modality for further  evaluation.

## 2022-05-17 NOTE — LETTER
5/17/2022         RE: Bess Enamorado  [address] [facility] [address]        Dear Colleague,    Thank you for referring your patient, Bess Enamorado, to the [facility]. Please see a copy of my visit note below.    Pulmonary Clinic Outpatient Consultation    Assessment and Plan:   46 year old female never smoker with a history of severe NARCISA on CPAP with possible associated pulmonary HTN, HFpEF, obesity, depression/anxiety, COVID-19 positive in December 2021 and February 2022, tiny asymptomatic pulmonary embolism in February 2022, PCOS previously on estrogen (currently on hold), RLS, right leg and abdominal wall cellulitis, recurrent UTIs, presenting for evaluation of pulmonary embolism.    Pulmonary embolism: Tiny asymptomatic right-sided pulmonary embolism in early February 2022 in the context of recent COVID-19 infection. Asymptomatic. No FHx of VTE, and this was a first-time episode for the patient. Some epistaxis and easy bruising on the rivaroxaban. Likely pulmonary HTN though this is likely WHO group 3 due to NARCISA and unrelated to the tiny PE. As the PE was likely related to recent COVID-19 infection, and as she is a nonsmoker, okay to stop rivaroxaban as she has completed over 3 months of therapy, and okay to restart estrogen, though did discuss that she is at a somewhat increased risk of recurrent VTE, and if recurrence were to occur, indefinite anticoagulation would be recommended.    Plan:  - stop rivaroxaban  - okay to restart estrogen  - advised of somewhat increased risk of recurrent VTE; if recurs, indefinite anticoagulation would be recommended  - encouraged her to contact me with questions or concerning symptoms    Onur Chaudhry MD  [facility]  Office [phone]  Pager [phone]  (he/him/his)    CCx: pulmonary embolism    HPI: 46 year old female never smoker with a history of severe NARCISA on CPAP with possible associated pulmonary HTN, HFpEF,  obesity, depression/anxiety, COVID-19 positive in December 2021 and February 2022, tiny asymptomatic pulmonary embolism in February 2022, PCOS previously on estrogen (currently on hold), RLS, right leg and abdominal wall cellulitis, recurrent UTIs, presenting for evaluation of pulmonary embolism. Some chronic exertional dyspnea but stable. No chest pain or pressure. No FHx of VTE. Occasional epistaxis, easy bruising. Would like to restart estrogen for PCOS. Has been on rivaroxaban for over 3 months.    ROS:  A 12-system review was obtained and was negative with the exception of the symptoms endorsed in the history of present illness.    PMH:  Past Medical History:   Diagnosis Date     Acute on chronic diastolic congestive heart failure (H) 2/9/2022     Arthritis 2019    Hips     ASCUS favor benign 8/2015    Neg HPV     Depressive disorder 2010     H/O colposcopy with cervical biopsy 9/14/15    Bx & ECC - negative     Hypertension 2019     LSIL on Pap smear 7/2014    Neg high risk HPV     Multiple sclerosis (H) 8/29/2005 9/18/200pt dx with MS 2004--dx by neurolgist and is followed by Dr. Steven Ayala 10/2016     UNSPEC CONSTIPATION 9/18/2006 9/18/2006   Has tried otc suppository and otc lax.        PSH:  Past Surgical History:   Procedure Laterality Date     CHOLECYSTECTOMY, LAPOROSCOPIC      Cholecystectomy, Laparoscopic     COLONOSCOPY  2007?    Bathroom issue..results normal     COMBINED CYSTOSCOPY, RETROGRADES, EXCHANGE STENT URETER(S) Right 2/21/2022    Procedure: CYSTOSCOPY, WITH right RETROGRADE PYELOGRAM AND right URETERAL STENT PLACEMENT;  Surgeon: Doyle Palomares MD;  Location: [facility] OR     OPEN REDUCTION INTERNAL FIXATION TOE(S)  4/13/2012    Procedure:OPEN REDUCTION INTERNAL FIXATION TOE(S); Open reduction internal fixation right proximal fifth metatarsal fracture-   Anes-choice block; Surgeon:LEY, JEFFREY DUANE; Location:[facility] OR     PICC TRIPLE LUMEN PLACEMENT  2/21/2022             Allergies:  Allergies   Allergen Reactions     Nkda [No Known Drug Allergies]        Family HX:  Family History   Problem Relation Age of Onset     Neurologic Disorder Father         MS     Heart Disease Father         irregular heart rate     Diabetes Father      Melanoma Father      Sleep Apnea Father      Other Cancer Father      Cancer Maternal Grandfather         lung     Other Cancer Maternal Grandfather      Cancer Paternal Grandmother         stomach     Other Cancer Paternal Grandmother      Cancer Mother      Other Cancer Mother      Thyroid Disease Mother      Gynecology Maternal Aunt         PCOS     Hypertension Maternal Grandmother      Anxiety Disorder Maternal Grandmother      Thyroid Disease Maternal Grandmother      Anxiety Disorder Sister        Social Hx:  Social History     Socioeconomic History     Marital status: Single     Spouse name: Not on file     Number of children: Not on file     Years of education: Not on file     Highest education level: Not on file   Occupational History     Employer: [employer]   Tobacco Use     Smoking status: Never Smoker     Smokeless tobacco: Never Used   Vaping Use     Vaping Use: Never used   Substance and Sexual Activity     Alcohol use: Yes     Comment: social     Drug use: No     Sexual activity: Not Currently     Partners: Male     Birth control/protection: Pill   Other Topics Concern     Parent/sibling w/ CABG, MI or angioplasty before 65F 55M? No   Social History Narrative    , 3rd-5th grade     Social Determinants of Health     Financial Resource Strain: Low Risk      Difficulty of Paying Living Expenses: Not very hard   Food Insecurity: No Food Insecurity     Worried About Running Out of Food in the Last Year: Never true     Ran Out of Food in the Last Year: Never true   Transportation Needs: No Transportation Needs     Lack of Transportation (Medical): No     Lack of Transportation (Non-Medical): No   Physical  Activity: Inactive     Days of Exercise per Week: 0 days     Minutes of Exercise per Session: 0 min   Stress: Not on file   Social Connections: Unknown     Frequency of Communication with Friends and Family: Twice a week     Frequency of Social Gatherings with Friends and Family: Twice a week     Attends Sikhism Services: Not on file     Active Member of Clubs or Organizations: Not on file     Attends Club or Organization Meetings: Not on file     Marital Status: Not on file   Intimate Partner Violence: Not At Risk     Fear of Current or Ex-Partner: No     Emotionally Abused: No     Physically Abused: No     Sexually Abused: No   Housing Stability: Not on file       Current Meds:  Current Outpatient Medications   Medication Sig Dispense Refill     ACE/ARB/ARNI NOT PRESCRIBED (INTENTIONAL) Please choose reason not prescribed from choices below.       acetaminophen (TYLENOL) 325 MG tablet Take 2 tablets (650 mg) by mouth every 4 hours as needed for mild pain  0     ASPIRIN NOT PRESCRIBED (INTENTIONAL) Please choose reason not prescribed from choices below.       Cholecalciferol (VITAMIN D3 PO) Take 10,000 Units by mouth daily        cinacalcet (SENSIPAR) 30 MG tablet Take 1 tablet (30 mg) by mouth three times a week 30 tablet 0     desogestrel-ethinyl estradiol (APRI) 0.15-30 MG-MCG tablet TAKE 1 TABLET DAILY CONTINUOUSLY-OMIT PLACEBPO TABS UNTIL AFTER 3 MONTHS OF HORMONE TABS 112 tablet 3     gabapentin (NEURONTIN) 100 MG capsule Take 1 capsule (100 mg) by mouth in the morning and 1 capsule (100 mg) at noon and 1 capsule (100 mg) in the evening. 540 capsule 1     metoprolol succinate ER (TOPROL-XL) 25 MG 24 hr tablet TAKE 1 TABLET(25 MG) BY MOUTH DAILY 90 tablet 1     miconazole (MICATIN) 2 % external powder Apply topically 2 times daily (Patient taking differently: Apply topically 2 times daily as needed)       sertraline (ZOLOFT) 100 MG tablet Take 1 tablet (100 mg) by mouth daily 90 tablet 3     sevelamer  "carbonate (RENVELA) 800 MG tablet Take 1 tablet (800 mg) by mouth in the morning. 90 tablet 0     bumetanide (BUMEX) 2 MG tablet Take 1 tablet (2 mg) by mouth 2 times daily 120 tablet 3       Physical Exam:  /69   Pulse 105   Ht 1.626 m (5' 4\")   Wt (!) 150.1 kg (331 lb)   LMP 04/03/2022   SpO2 96%   BMI 56.82 kg/m    Gen: alert, oriented, no distress  HEENT: no cervical or supraclavicular lymphadenopathy  CV: tachy, regular, no M/G/R  Resp: CTAB, no focal crackles or wheezes  Skin: no apparent rashes  Ext: no cyanosis, clubbing or edema  Neuro: alert, nonfocal    Labs:  reviewed    Imaging studies:  Chest CTA (February 2022):  - images directly reviewed, formal interpretation follows:  FINDINGS:  ANGIOGRAM CHEST: Enlarged main pulmonary artery is 4 cm diameter consistent with pulmonary arterial hypertension. Small nonocclusive thrombus right upper lobe pulmonary artery (series 5, image 66). No additional thrombi seen.     Thoracic aorta is negative for dissection. RV/LV ratio abnormal, greater than 1.0.     LUNGS AND PLEURA: Diffuse lung mosaic attenuation, similar to previous which can be seen with pulmonary arterial hypertension. No right upper and middle lobe groundglass/consolidative infiltrates suggesting an acute pneumonia. No pleural effusion.     MEDIASTINUM/AXILLAE: No adenopathy.     CORONARY ARTERY CALCIFICATION: None.     UPPER ABDOMEN: Absent gallbladder     MUSCULOSKELETAL: Upper chest wall venous collaterals. Spinal degenerative change.                                                                      IMPRESSION:  1.  Small nonocclusive right upper lobe pulmonary embolism.  2.  Pulmonary arterial hypertension.  3.  Right heart dysfunction.  4.  New right lung infiltrates compatible with pneumonia.    TTE (May 2022):    1. Technically difficult study despite utilization of ultrasound enhancing  agent.  2. The left ventricle is normal in size. Image quality does not provide for  detailed " assessment of LV systolic function, but is felt to be normal with a  visually estimated ejection fraction of roughly 60%.  3. There is abnormal septal motion c/w right ventricular overload; there is  diastolic flattening of the septum with a more normal configuration at end  systole.  4. There is mild concentric increase in left ventricular wall thickness.  5. No significant valvular heart disease is identified on this study.  6. Probable mild right ventricular enlargement with moderate reduction right  ventricular systolic performance (the right ventricle is very poorly  visualized on the study).  7. There is mild left atrial enlargement. There is borderline right atrial  enlargement.  8. Right ventricular systolic pressure relative to right atrial pressure is  moderately increased. The pulmonary artery pressure is estimated to be 60-65  mmHg plus right atrial pressure (the IVC is moderately dilated).     When compared to the prior real-time echocardiogram dated 9 February 2022,  right ventricular systolic pressure relative to right atrial pressure is now  measured to be moderately increased (could not be assessed on prior study).  The findings are otherwise are felt to be fairly similar on both examinations.  Accurate comparison, however, is quite difficult due to the suboptimal  acoustic imaging not only on the current examination, but also the previous.     Additional comments: The acoustic quality of this study is quite poor. If a  more accurate assessment of left & right ventricular systolic performance,  regional wall motion, and other morphology/function is required; would  recommend cardiac MRI or other alternative imaging modality for further  evaluation.        Again, thank you for allowing me to participate in the care of your patient.        Sincerely,        Onur Chaudhry MD

## 2022-05-17 NOTE — TELEPHONE ENCOUNTER
RN called and gave verbal approval orders for the request below. Per the Home Care, Assisted Living or Nursing Home Evaluations and treatments (Including After Hours)-[facility] and [facility] this RN is able to do so.     Summer Lizarraga RN BSN PHN

## 2022-05-17 NOTE — TELEPHONE ENCOUNTER
Reason for Call:  Home Health Care    Aylin with [facility] called regarding (reason for call): Please call with verbal orders - OK to leave detailed message    Orders are needed for this patient.     Skilled Nursinx a week x 1 week and then 1x a week x 4 weeks for cellulitis treatment and Home Health Aid 1x a week x 5 weeks    Pt Provider: Jeremy    Phone Number Homecare Nurse can be reached at: [phone]    Can we leave a detailed message on this number? YES    Phone number patient can be reached at: Home number on file [phone] (home)    Best Time: any    Call taken on 2022 at 3:03 PM by Jewels Stevens

## 2022-05-17 NOTE — TELEPHONE ENCOUNTER
"Bess is contacted to discuss her current wt gain of 4.5# since Friday.  She notes that her diet has not been the best lately and she has consumed more dietary sodium than her recommended 2000mg restriction.   Her fluids are well within the 50-60 oz per day, she reports.    Her LE edema is \"the same\" she explains that she always has some ankle edema that never resolves. Her abdomen is bloated over this past three days. Appetite is good, sleeping without interruption.Denies dizziness, no lightheadedness or cough noted.  Increased fatigue is prevalent. She is currently using Bumex 2mg daily dose.    Discussed with Dr. Sanchez who recommended Bumex 2mg bid at this juncture.  BMP in one week to review her kidney function.    Orders placed    10 minutes total time     Alexandra Mukherjee RN BSN, CHFN          "

## 2022-05-17 NOTE — PATIENT INSTRUCTIONS
It was good to meet you in clinic today. This is what we discussed:    You had a very tiny possible blood clot in the right lung at the beginning of February 2022, likely related to your recent COVID-19 infection.  After completing over 3 months of Xarelto, it is fine to stop it.  It is okay to restart Apri. This could slightly increase the risk of blood clots, but not much.  If you have questions or concerns, let me know.    Onur Chaudhry MD  Pulmonary and Critical Care Medicine  [facility]  Office [phone]

## 2022-05-18 ENCOUNTER — MEDICAL CORRESPONDENCE (OUTPATIENT)
Dept: HEALTH INFORMATION MANAGEMENT | Facility: CLINIC | Age: 47
End: 2022-05-18
Payer: COMMERCIAL

## 2022-05-24 ENCOUNTER — TELEPHONE (OUTPATIENT)
Dept: CARDIOLOGY | Facility: CLINIC | Age: 47
End: 2022-05-24
Payer: COMMERCIAL

## 2022-05-24 NOTE — TELEPHONE ENCOUNTER
"Bess is contacted with HRS wt alert of 2# gain from yesterday. She reports that she has no progressive symptoms and did not increase her Bumex 2mg dose up to bid as instructed by Dr. Sanchez , instead she remained at the Bumex 2mg daily dose. This is due to her extreme concerns with her kidney function. \" I have been very, very worried about my kidney function and I do not want to do anything to risk damaging them again, she reports\"    She \"feels fine\", she reports, and \" has not seen the need to take the increased Bumex dose\"    Denies dizziness, no lightheadedness or cough noted. Denies LE edema except mildly noted at the end of the day. Sleeping fine.     FYI to Key Flanagan, CANDICE Mukherjee RN BSN, CHFN    Total time 15 minutes  "

## 2022-05-24 NOTE — TELEPHONE ENCOUNTER
Bess was LM today to advise patient take Bumex 2mg bid as previously advised. That Bmp labs can be checked tomorrow during clinic appt. Reinforced that her kidney function had returned to normal and we are closely watching her kidney function.    Alexandra Mukherjee RN BSN, CHFN

## 2022-05-24 NOTE — TELEPHONE ENCOUNTER
Please have her take Bumex 2 mg twice a day.  I can check blood work tomorrow during our appointment.  Please reiterate her kidney function has been normal as of lately.  Thank you

## 2022-05-25 ENCOUNTER — OFFICE VISIT (OUTPATIENT)
Dept: CARDIOLOGY | Facility: CLINIC | Age: 47
End: 2022-05-25
Attending: INTERNAL MEDICINE
Payer: COMMERCIAL

## 2022-05-25 VITALS
SYSTOLIC BLOOD PRESSURE: 112 MMHG | DIASTOLIC BLOOD PRESSURE: 62 MMHG | WEIGHT: 293 LBS | BODY MASS INDEX: 50.02 KG/M2 | HEART RATE: 88 BPM | HEIGHT: 64 IN | RESPIRATION RATE: 16 BRPM

## 2022-05-25 DIAGNOSIS — I50.33 ACUTE ON CHRONIC DIASTOLIC CONGESTIVE HEART FAILURE (H): ICD-10-CM

## 2022-05-25 DIAGNOSIS — I10 BENIGN ESSENTIAL HYPERTENSION: Chronic | ICD-10-CM

## 2022-05-25 DIAGNOSIS — I50.32 CHRONIC HEART FAILURE WITH PRESERVED EJECTION FRACTION (H): Primary | ICD-10-CM

## 2022-05-25 DIAGNOSIS — G47.33 OSA ON CPAP: ICD-10-CM

## 2022-05-25 PROCEDURE — 99214 OFFICE O/P EST MOD 30 MIN: CPT | Performed by: NURSE PRACTITIONER

## 2022-05-25 NOTE — LETTER
5/25/2022    Mikala Luna MD  [id number]  [facility] [address]    RE: Bess Enamorado       Dear Colleague,     I had the pleasure of seeing Bess Enamorado in the [facility].        Assessment/Recommendations   Assessment:    1.  Heart failure with preserved ejection fraction, NYHA class III: She continues to have dyspnea on exertion and chronic lymphedema.  Last week her weight increased 4-1/2 pounds and Dr. Sanchez increased her Bumex 2 mg twice a day.  She did not start doing this due to concerns of kidney function.  She states she took Bumex twice a day yesterday and her weight decreased to 328 pounds today.  She is enrolled in [facility] and [facility] service.  2.  Hypertension: Controlled.  Blood pressure 112/62  3.  NARCISA: She wears her CPAP nightly    Plan:  1.  Continue HRS and [facility] meal service  2.  Take Bumex 2 mg twice a day for 3 days then continue 2 mg daily thereafter  3.  Continue wearing CPAP    Bess Enamorado will follow up with Dr. Sanchez in 3 months and in the heart failure clinic in 6 months.     History of Present Illness/Subjective    Ms. Bess Enamorado is a 46 year old female seen at [facility] heart failure clinic today for continued follow-up.  Her sister accompanies her today.  She follows up for heart failure with preserved ejection fraction.  She had an echocardiogram in February which showed ejection fraction of 65%.  She has a past medical history significant for PE, non-STEMI, hypertension, morbid obesity, COVID-19, lymphedema, NARCISA on CPAP, MS.    Today, she continues to have dyspnea on exertion and chronic lymphedema.  She denies lightheadedness, orthopnea, PND, palpitations, chest pain and abdominal fullness/bloating.      She is monitoring home weights which increased last week.  Her weight today was 328 pounds at home.  She is following a low sodium diet.  She is enrolled in [facility] service.        ECHOCARDIOGRAM: 5/13/2022  Interpretation  "Summary     1. Technically difficult study despite utilization of ultrasound enhancing  agent.  2. The left ventricle is normal in size. Image quality does not provide for  detailed assessment of LV systolic function, but is felt to be normal with a  visually estimated ejection fraction of roughly 60%.  3. There is abnormal septal motion c/w right ventricular overload; there is  diastolic flattening of the septum with a more normal configuration at end  systole.  4. There is mild concentric increase in left ventricular wall thickness.  5. No significant valvular heart disease is identified on this study.  6. Probable mild right ventricular enlargement with moderate reduction right  ventricular systolic performance (the right ventricle is very poorly  visualized on the study).  7. There is mild left atrial enlargement. There is borderline right atrial  enlargement.  8. Right ventricular systolic pressure relative to right atrial pressure is  moderately increased. The pulmonary artery pressure is estimated to be 60-65  mmHg plus right atrial pressure (the IVC is moderately dilated).     Physical Examination Review of Systems   /62 (BP Location: Right arm, Patient Position: Sitting, Cuff Size: Adult Large)   Pulse 88   Resp 16   Ht 1.626 m (5' 4\")   Wt 148.8 kg (328 lb)   LMP 04/03/2022   BMI 56.30 kg/m    Body mass index is 56.3 kg/m .  Wt Readings from Last 3 Encounters:   05/25/22 148.8 kg (328 lb)   05/17/22 (!) 150.1 kg (331 lb)   05/11/22 146.7 kg (323 lb 8 oz)       General Appearance:   no acute distress, obese   ENT/Mouth: Wearing mask   EYES:  no scleral icterus, normal conjunctivae   Neck: no thyromegaly   Chest/Lungs:   lungs are clear to auscultation, no rales or wheezing, equal chest wall expansion    Cardiovascular:   Regular. Normal first and second heart sounds with no murmurs, rubs, or gallops; JVP is difficult to assess due to the patient's obesity and body habitus, lymphedema   Abdomen:  "  Obese, no organomegaly, masses, bruits, or tenderness; bowel sounds are present   Extremities: no cyanosis or clubbing   Skin: no xanthelasma, warm.    Neurologic: normal  bilateral, no tremors     Psychiatric: alert and oriented x3                                              Medical History  Surgical History Family History Social History   Past Medical History:   Diagnosis Date     Acute on chronic diastolic congestive heart failure (H) 02/09/2022     Arthritis 2019    Hips     ASCUS favor benign 08/2015    Neg HPV     Depressive disorder 2010     H/O colposcopy with cervical biopsy 09/14/2015    Bx & ECC - negative     Hypertension 2019     LSIL on Pap smear 07/2014    Neg high risk HPV     Multiple sclerosis (H) 08/29/2005 9/18/200pt dx with MS 2004--dx by neurolgist and is followed by Dr. Harris     Myocardial infarction (H)      Obese      Shingles 10/2016     Sleep apnea      UNSPEC CONSTIPATION 09/18/2006 9/18/2006   Has tried otc suppository and otc lax.     Past Surgical History:   Procedure Laterality Date     CHOLECYSTECTOMY, LAPOROSCOPIC      Cholecystectomy, Laparoscopic     COLONOSCOPY  2007?    Bathroom issue..results normal     COMBINED CYSTOSCOPY, RETROGRADES, EXCHANGE STENT URETER(S) Right 2/21/2022    Procedure: CYSTOSCOPY, WITH right RETROGRADE PYELOGRAM AND right URETERAL STENT PLACEMENT;  Surgeon: Doyle Palomares MD;  Location: [facility]     OPEN REDUCTION INTERNAL FIXATION TOE(S)  4/13/2012    Procedure:OPEN REDUCTION INTERNAL FIXATION TOE(S); Open reduction internal fixation right proximal fifth metatarsal fracture-   Anes-choice block; Surgeon:LEY, JEFFREY DUANE; Location:[place] OR     PICC TRIPLE LUMEN PLACEMENT  2/21/2022         Family History   Problem Relation Age of Onset     Neurologic Disorder Father         MS     Heart Disease Father         irregular heart rate     Diabetes Father      Melanoma Father      Sleep Apnea Father      Other Cancer Father      Cancer  Maternal Grandfather         lung     Other Cancer Maternal Grandfather      Cancer Paternal Grandmother         stomach     Other Cancer Paternal Grandmother      Cancer Mother      Other Cancer Mother      Thyroid Disease Mother      Gynecology Maternal Aunt         PCOS     Hypertension Maternal Grandmother      Anxiety Disorder Maternal Grandmother      Thyroid Disease Maternal Grandmother      Anxiety Disorder Sister     Social History     Socioeconomic History     Marital status: Single     Spouse name: Not on file     Number of children: Not on file     Years of education: Not on file     Highest education level: Not on file   Occupational History     Employer: [employer]   Tobacco Use     Smoking status: Never Smoker     Smokeless tobacco: Never Used   Vaping Use     Vaping Use: Never used   Substance and Sexual Activity     Alcohol use: Yes     Comment: social     Drug use: No     Sexual activity: Not Currently     Partners: Male     Birth control/protection: Pill   Other Topics Concern     Parent/sibling w/ CABG, MI or angioplasty before 65F 55M? No   Social History Narrative    , 3rd-5th grade     Social Determinants of Health     Financial Resource Strain: Low Risk      Difficulty of Paying Living Expenses: Not very hard   Food Insecurity: No Food Insecurity     Worried About Running Out of Food in the Last Year: Never true     Ran Out of Food in the Last Year: Never true   Transportation Needs: No Transportation Needs     Lack of Transportation (Medical): No     Lack of Transportation (Non-Medical): No   Physical Activity: Inactive     Days of Exercise per Week: 0 days     Minutes of Exercise per Session: 0 min   Stress: Not on file   Social Connections: Unknown     Frequency of Communication with Friends and Family: Twice a week     Frequency of Social Gatherings with Friends and Family: Twice a week     Attends Denominational Services: Not on file     Active Member of Clubs or  Organizations: Not on file     Attends Club or Organization Meetings: Not on file     Marital Status: Not on file   Intimate Partner Violence: Not At Risk     Fear of Current or Ex-Partner: No     Emotionally Abused: No     Physically Abused: No     Sexually Abused: No   Housing Stability: Not on file          Medications  Allergies   Current Outpatient Medications   Medication Sig Dispense Refill     acetaminophen (TYLENOL) 325 MG tablet Take 2 tablets (650 mg) by mouth every 4 hours as needed for mild pain  0     bumetanide (BUMEX) 2 MG tablet Take 1 tablet (2 mg) by mouth 2 times daily (Patient taking differently: Take 2 mg by mouth daily) 120 tablet 3     Cholecalciferol (VITAMIN D3 PO) Take 10,000 Units by mouth daily        cinacalcet (SENSIPAR) 30 MG tablet Take 1 tablet (30 mg) by mouth three times a week 30 tablet 0     gabapentin (NEURONTIN) 100 MG capsule Take 1 capsule (100 mg) by mouth in the morning and 1 capsule (100 mg) at noon and 1 capsule (100 mg) in the evening. 540 capsule 1     metoprolol succinate ER (TOPROL-XL) 25 MG 24 hr tablet TAKE 1 TABLET(25 MG) BY MOUTH DAILY 90 tablet 1     miconazole (MICATIN) 2 % external powder Apply topically 2 times daily (Patient taking differently: Apply topically 2 times daily as needed)       sertraline (ZOLOFT) 100 MG tablet Take 1 tablet (100 mg) by mouth daily 90 tablet 3     sevelamer carbonate (RENVELA) 800 MG tablet Take 1 tablet (800 mg) by mouth in the morning. 90 tablet 0     ACE/ARB/ARNI NOT PRESCRIBED (INTENTIONAL) Please choose reason not prescribed from choices below.       ASPIRIN NOT PRESCRIBED (INTENTIONAL) Please choose reason not prescribed from choices below.       desogestrel-ethinyl estradiol (APRI) 0.15-30 MG-MCG tablet TAKE 1 TABLET DAILY CONTINUOUSLY-OMIT PLACEBPO TABS UNTIL AFTER 3 MONTHS OF HORMONE TABS (Patient not taking: Reported on 5/25/2022) 112 tablet 3    Allergies   Allergen Reactions     Nkda [No Known Drug Allergies]           Lab Results    Chemistry/lipid CBC Cardiac Enzymes/BNP/TSH/INR   Lab Results   Component Value Date    CHOL 173 05/11/2022    HDL 27 (L) 05/11/2022    TRIG 285 (H) 05/11/2022    BUN 22 05/11/2022     05/11/2022    CO2 29 05/11/2022    Lab Results   Component Value Date    WBC 8.5 05/11/2022    HGB 12.4 05/11/2022    HCT 39.1 05/11/2022    MCV 86 05/11/2022     05/11/2022    Lab Results   Component Value Date    TROPONINI 0.03 04/06/2022     (H) 04/06/2022    TSH 1.52 04/07/2022    INR 1.23 (H) 02/11/2022             This note has been dictated using voice recognition software. Any grammatical, typographical, or context distortions are unintentional and inherent to the software    30 minutes spent on the date of encounter doing chart review, review of outside records, review of test results, patient visit, documentation and discussion with family.              Thank you for allowing me to participate in the care of your patient.      Sincerely,     JULIETA Herbert [facility]  cc:   Raúl Sanchez MD  [address] [place] [address]

## 2022-05-25 NOTE — PROGRESS NOTES
Assessment/Recommendations   Assessment:    1.  Heart failure with preserved ejection fraction, NYHA class III: She continues to have dyspnea on exertion and chronic lymphedema.  Last week her weight increased 4-1/2 pounds and Dr. Sanchez increased her Bumex 2 mg twice a day.  She did not start doing this due to concerns of kidney function.  She states she took Bumex twice a day yesterday and her weight decreased to 328 pounds today.  She is enrolled in [employer] and [facility] meal service.  2.  Hypertension: Controlled.  Blood pressure 112/62  3.  NARCISA: She wears her CPAP nightly    Plan:  1.  Continue HRS and [facility] meal service  2.  Take Bumex 2 mg twice a day for 3 days then continue 2 mg daily thereafter  3.  Continue wearing CPAP    Bess Enamorado will follow up with Dr. Sanchez in 3 months and in the heart failure clinic in 6 months.     History of Present Illness/Subjective    Ms. Bess Enamorado is a 46 year old female seen at [facility] heart failure clinic today for continued follow-up.  Her sister accompanies her today.  She follows up for heart failure with preserved ejection fraction.  She had an echocardiogram in February which showed ejection fraction of 65%.  She has a past medical history significant for PE, non-STEMI, hypertension, morbid obesity, COVID-19, lymphedema, NARCISA on CPAP, MS.    Today, she continues to have dyspnea on exertion and chronic lymphedema.  She denies lightheadedness, orthopnea, PND, palpitations, chest pain and abdominal fullness/bloating.      She is monitoring home weights which increased last week.  Her weight today was 328 pounds at home.  She is following a low sodium diet.  She is enrolled in [employer] service.        ECHOCARDIOGRAM: 5/13/2022  Interpretation Summary     1. Technically difficult study despite utilization of ultrasound enhancing  agent.  2. The left ventricle is normal in size. Image quality does not provide for  detailed assessment of LV  "systolic function, but is felt to be normal with a  visually estimated ejection fraction of roughly 60%.  3. There is abnormal septal motion c/w right ventricular overload; there is  diastolic flattening of the septum with a more normal configuration at end  systole.  4. There is mild concentric increase in left ventricular wall thickness.  5. No significant valvular heart disease is identified on this study.  6. Probable mild right ventricular enlargement with moderate reduction right  ventricular systolic performance (the right ventricle is very poorly  visualized on the study).  7. There is mild left atrial enlargement. There is borderline right atrial  enlargement.  8. Right ventricular systolic pressure relative to right atrial pressure is  moderately increased. The pulmonary artery pressure is estimated to be 60-65  mmHg plus right atrial pressure (the IVC is moderately dilated).     Physical Examination Review of Systems   /62 (BP Location: Right arm, Patient Position: Sitting, Cuff Size: Adult Large)   Pulse 88   Resp 16   Ht 1.626 m (5' 4\")   Wt 148.8 kg (328 lb)   LMP 04/03/2022   BMI 56.30 kg/m    Body mass index is 56.3 kg/m .  Wt Readings from Last 3 Encounters:   05/25/22 148.8 kg (328 lb)   05/17/22 (!) 150.1 kg (331 lb)   05/11/22 146.7 kg (323 lb 8 oz)       General Appearance:   no acute distress, obese   ENT/Mouth: Wearing mask   EYES:  no scleral icterus, normal conjunctivae   Neck: no thyromegaly   Chest/Lungs:   lungs are clear to auscultation, no rales or wheezing, equal chest wall expansion    Cardiovascular:   Regular. Normal first and second heart sounds with no murmurs, rubs, or gallops; JVP is difficult to assess due to the patient's obesity and body habitus, lymphedema   Abdomen:   Obese, no organomegaly, masses, bruits, or tenderness; bowel sounds are present   Extremities: no cyanosis or clubbing   Skin: no xanthelasma, warm.    Neurologic: normal  bilateral, no " tremors     Psychiatric: alert and oriented x3                                              Medical History  Surgical History Family History Social History   Past Medical History:   Diagnosis Date     Acute on chronic diastolic congestive heart failure (H) 02/09/2022     Arthritis 2019    Hips     ASCUS favor benign 08/2015    Neg HPV     Depressive disorder 2010     H/O colposcopy with cervical biopsy 09/14/2015    Bx & ECC - negative     Hypertension 2019     LSIL on Pap smear 07/2014    Neg high risk HPV     Multiple sclerosis (H) 08/29/2005 9/18/200pt dx with MS 2004--dx by neurolgist and is followed by Dr. Harris     Myocardial infarction (H)      Obese      Shingles 10/2016     Sleep apnea      UNSPEC CONSTIPATION 09/18/2006 9/18/2006   Has tried otc suppository and otc lax.     Past Surgical History:   Procedure Laterality Date     CHOLECYSTECTOMY, LAPOROSCOPIC      Cholecystectomy, Laparoscopic     COLONOSCOPY  2007?    Bathroom issue..results normal     COMBINED CYSTOSCOPY, RETROGRADES, EXCHANGE STENT URETER(S) Right 2/21/2022    Procedure: CYSTOSCOPY, WITH right RETROGRADE PYELOGRAM AND right URETERAL STENT PLACEMENT;  Surgeon: Doyle Palomares MD;  Location: [facility] OR     OPEN REDUCTION INTERNAL FIXATION TOE(S)  4/13/2012    Procedure:OPEN REDUCTION INTERNAL FIXATION TOE(S); Open reduction internal fixation right proximal fifth metatarsal fracture-   Anes-choice block; Surgeon:LEY, JEFFREY DUANE; Location:[place] OR     PICC TRIPLE LUMEN PLACEMENT  2/21/2022         Family History   Problem Relation Age of Onset     Neurologic Disorder Father         MS     Heart Disease Father         irregular heart rate     Diabetes Father      Melanoma Father      Sleep Apnea Father      Other Cancer Father      Cancer Maternal Grandfather         lung     Other Cancer Maternal Grandfather      Cancer Paternal Grandmother         stomach     Other Cancer Paternal Grandmother      Cancer Mother      Other  Cancer Mother      Thyroid Disease Mother      Gynecology Maternal Aunt         PCOS     Hypertension Maternal Grandmother      Anxiety Disorder Maternal Grandmother      Thyroid Disease Maternal Grandmother      Anxiety Disorder Sister     Social History     Socioeconomic History     Marital status: Single     Spouse name: Not on file     Number of children: Not on file     Years of education: Not on file     Highest education level: Not on file   Occupational History     Employer: [employer]   Tobacco Use     Smoking status: Never Smoker     Smokeless tobacco: Never Used   Vaping Use     Vaping Use: Never used   Substance and Sexual Activity     Alcohol use: Yes     Comment: social     Drug use: No     Sexual activity: Not Currently     Partners: Male     Birth control/protection: Pill   Other Topics Concern     Parent/sibling w/ CABG, MI or angioplasty before 65F 55M? No   Social History Narrative    , 3rd-5th grade     Social Determinants of Health     Financial Resource Strain: Low Risk      Difficulty of Paying Living Expenses: Not very hard   Food Insecurity: No Food Insecurity     Worried About Running Out of Food in the Last Year: Never true     Ran Out of Food in the Last Year: Never true   Transportation Needs: No Transportation Needs     Lack of Transportation (Medical): No     Lack of Transportation (Non-Medical): No   Physical Activity: Inactive     Days of Exercise per Week: 0 days     Minutes of Exercise per Session: 0 min   Stress: Not on file   Social Connections: Unknown     Frequency of Communication with Friends and Family: Twice a week     Frequency of Social Gatherings with Friends and Family: Twice a week     Attends Cheondoism Services: Not on file     Active Member of Clubs or Organizations: Not on file     Attends Club or Organization Meetings: Not on file     Marital Status: Not on file   Intimate Partner Violence: Not At Risk     Fear of Current or  Ex-Partner: No     Emotionally Abused: No     Physically Abused: No     Sexually Abused: No   Housing Stability: Not on file          Medications  Allergies   Current Outpatient Medications   Medication Sig Dispense Refill     acetaminophen (TYLENOL) 325 MG tablet Take 2 tablets (650 mg) by mouth every 4 hours as needed for mild pain  0     bumetanide (BUMEX) 2 MG tablet Take 1 tablet (2 mg) by mouth 2 times daily (Patient taking differently: Take 2 mg by mouth daily) 120 tablet 3     Cholecalciferol (VITAMIN D3 PO) Take 10,000 Units by mouth daily        cinacalcet (SENSIPAR) 30 MG tablet Take 1 tablet (30 mg) by mouth three times a week 30 tablet 0     gabapentin (NEURONTIN) 100 MG capsule Take 1 capsule (100 mg) by mouth in the morning and 1 capsule (100 mg) at noon and 1 capsule (100 mg) in the evening. 540 capsule 1     metoprolol succinate ER (TOPROL-XL) 25 MG 24 hr tablet TAKE 1 TABLET(25 MG) BY MOUTH DAILY 90 tablet 1     miconazole (MICATIN) 2 % external powder Apply topically 2 times daily (Patient taking differently: Apply topically 2 times daily as needed)       sertraline (ZOLOFT) 100 MG tablet Take 1 tablet (100 mg) by mouth daily 90 tablet 3     sevelamer carbonate (RENVELA) 800 MG tablet Take 1 tablet (800 mg) by mouth in the morning. 90 tablet 0     ACE/ARB/ARNI NOT PRESCRIBED (INTENTIONAL) Please choose reason not prescribed from choices below.       ASPIRIN NOT PRESCRIBED (INTENTIONAL) Please choose reason not prescribed from choices below.       desogestrel-ethinyl estradiol (APRI) 0.15-30 MG-MCG tablet TAKE 1 TABLET DAILY CONTINUOUSLY-OMIT PLACEBPO TABS UNTIL AFTER 3 MONTHS OF HORMONE TABS (Patient not taking: Reported on 5/25/2022) 112 tablet 3    Allergies   Allergen Reactions     Nkda [No Known Drug Allergies]          Lab Results    Chemistry/lipid CBC Cardiac Enzymes/BNP/TSH/INR   Lab Results   Component Value Date    CHOL 173 05/11/2022    HDL 27 (L) 05/11/2022    TRIG 285 (H) 05/11/2022     BUN 22 05/11/2022     05/11/2022    CO2 29 05/11/2022    Lab Results   Component Value Date    WBC 8.5 05/11/2022    HGB 12.4 05/11/2022    HCT 39.1 05/11/2022    MCV 86 05/11/2022     05/11/2022    Lab Results   Component Value Date    TROPONINI 0.03 04/06/2022     (H) 04/06/2022    TSH 1.52 04/07/2022    INR 1.23 (H) 02/11/2022             This note has been dictated using voice recognition software. Any grammatical, typographical, or context distortions are unintentional and inherent to the software    30 minutes spent on the date of encounter doing chart review, review of outside records, review of test results, patient visit, documentation and discussion with family.

## 2022-05-25 NOTE — PATIENT INSTRUCTIONS
Bess Enamorado,    It was a pleasure to see you today at [facility].     My recommendations after this visit include:  - Please follow up with Dr Sanchez in 3 months   - Please follow up with Key Flanagan in 6 months  - Take bumex 2 mg twice a day for 3 days then continue 2 mg daily thereafter    Key Flanagan, CNP

## 2022-05-30 NOTE — PROGRESS NOTES
[facility]: [facility] Routine Remote Evaluation    Bess is a patient diagnosed with HFpEF started with HRS Remote Monitoring on 3/16/22.  Bess has been compliant with daily HRS Monitoring during the period of 4/16/22 through 5/17/22, and is engaged in the management of his/her heart failure.     In agreement with the ordering provider, the following parameters were used to monitor her. Any metrics outside of these parameters resulted in a call to the patient to assess for heart failure symptoms and notification of the patients provider.     Patient will be enrolled in [employer] ([employer]) technology. Nursing staff will monitor metric input and heart failure symptom questions daily via Clinician Connect Portal. Nursing will notify the heart failure provider if metrics fall outside the following parameters:  Notified for diastolic blood pressure greater than 100 and less than 50  Notified for systolic blood pressure greater than 160 and less than 80  Notified for heart rate greater than 100 and less than 50   Notified for pulse oximeter reading less than 90%  Notified of weight increase of 2 pounds in 1 days   Notified of weight increase of 5 pounds in 7 days   Notified of weight increase of 10 pounds from baseline weight  Patient will be prompted to answer daily symptom questions. Unfavorable questions will be addressed by nursing staff.   1. Have you experienced any increased shortness of breath with daily activity in the past 24 hours?   2. Have you had any increased or new swelling in your ankles, feet, or other body parts in the last 24 hours?   3. Have you experienced any increased tiredness or fatigue in the last 24 hours?      The following alerts/out of range interactions occurred during this monitoring period:  4/18/22  Wt alert  4/19/22  Wt alert  4/20/22  Wt alert  4/21/22  Wt alert  5/17/22  Wt alert    Next Visit: 5/25/22 with Key Flanagan CNP  Will continue  with weekly monitoring with HF provider team.     TOTAL INTERACTIVE COMMUNICATION WITH PATIENT DURING THIS BILLING PERIOD: 35 minutes.    Alexandra Mukherjee RN BSN, CHFN    I have reviewed Alexandra Mukherjee RN BSN, CHFN's note and agree.    Raúl Sanchez MD., MHS    READINGS:

## 2022-05-31 ENCOUNTER — TELEPHONE (OUTPATIENT)
Dept: CARDIOLOGY | Facility: CLINIC | Age: 47
End: 2022-05-31
Payer: COMMERCIAL

## 2022-05-31 DIAGNOSIS — I50.32 CHRONIC DIASTOLIC CONGESTIVE HEART FAILURE (H): Primary | ICD-10-CM

## 2022-05-31 NOTE — TELEPHONE ENCOUNTER
Bess was notified of dose increase of Bumex to 2mg bid indefinitely,  With labs in one week.   Alexandra Mukherjee RN BSN, CHFN

## 2022-05-31 NOTE — TELEPHONE ENCOUNTER
"Bess is contacted to discuss wt gain of 11.5# from 5/27  She denies dizziness, lightheadedness, no cough, no shortness of breath with activity. Has no reported LE edema or abdominal bloating noted.   \"I don't have any symptoms at all\" she reports.    She is currently using Bumex 2mg once a day.   She recently used Bumex 2mg bid x 3 days only then returned to once daily.  Her Creatinine level is normal.    Dr. Sanchez, do you have any new recommendations at this time?      Total Time 10 minutes     Alexandra Mukherjee RN BSN CHFN  "

## 2022-05-31 NOTE — TELEPHONE ENCOUNTER
Raúl Sanchez MD Keune, Shelly A RN  Caller: Unspecified (Today, 12:12 PM)  Please have Bess resuem bumetanide 2mg BID indefinitely.     Thanks;   ~ Daniela

## 2022-06-01 ENCOUNTER — TELEPHONE (OUTPATIENT)
Dept: CARDIOLOGY | Facility: CLINIC | Age: 47
End: 2022-06-01
Payer: COMMERCIAL

## 2022-06-01 NOTE — TELEPHONE ENCOUNTER
----- Message from Ivania Nolen sent at 6/1/2022 10:38 AM CDT -----  Regarding: KATHRIN pt  General phone call:    Caller: [facility]in   Primary cardiologist: KATHRIN   Detailed reason for call: Patient is supposed to have labs   Best phone number: [phone]  Best time to contact: any  Ok to leave a detailedmessage? yes  Device? no    Additional Info:

## 2022-06-01 NOTE — TELEPHONE ENCOUNTER
Contacted Aylin at [facility] , she will plan to do BMP labs for Bess next week.  Alexandra Mukherjee RN BSN, CHFN

## 2022-06-09 ENCOUNTER — LAB REQUISITION (OUTPATIENT)
Dept: LAB | Facility: CLINIC | Age: 47
End: 2022-06-09
Payer: COMMERCIAL

## 2022-06-09 DIAGNOSIS — N18.30 CHRONIC KIDNEY DISEASE, STAGE 3 UNSPECIFIED (H): ICD-10-CM

## 2022-06-09 LAB
ANION GAP SERPL CALCULATED.3IONS-SCNC: 5 MMOL/L (ref 3–14)
BUN SERPL-MCNC: 14 MG/DL (ref 7–30)
CALCIUM SERPL-MCNC: 9.5 MG/DL (ref 8.5–10.1)
CHLORIDE BLD-SCNC: 100 MMOL/L (ref 94–109)
CO2 SERPL-SCNC: 30 MMOL/L (ref 20–32)
CREAT SERPL-MCNC: 0.78 MG/DL (ref 0.52–1.04)
GFR SERPL CREATININE-BSD FRML MDRD: >90 ML/MIN/1.73M2
GLUCOSE BLD-MCNC: 83 MG/DL (ref 70–99)
POTASSIUM BLD-SCNC: 3.6 MMOL/L (ref 3.4–5.3)
SODIUM SERPL-SCNC: 135 MMOL/L (ref 133–144)

## 2022-06-09 PROCEDURE — 80048 BASIC METABOLIC PNL TOTAL CA: CPT | Mod: ORL | Performed by: INTERNAL MEDICINE

## 2022-06-13 ENCOUNTER — TELEPHONE (OUTPATIENT)
Dept: CARDIOLOGY | Facility: CLINIC | Age: 47
End: 2022-06-13

## 2022-06-13 NOTE — TELEPHONE ENCOUNTER
"Bess is contacted to discuss her current wt trend on HRS. She went up 6# in three days over this weekend. She reports eating poorly and knows better, but this is a constant ruiz for her. She would like to wait and let her wt come down on its' own , \" I know that is will recover on it's own if we just give it time\"  She is using Bumex 2mg daily dose.     Bess denies dizziness , lightheadedness, LE edema and abdominal bloating. She states she has no shortness of breath any worse than her usual.    Alexandra Mukherjee RN BSN, CHFN    Total time 10 minutes      "

## 2022-06-14 ENCOUNTER — TELEPHONE (OUTPATIENT)
Dept: FAMILY MEDICINE | Facility: CLINIC | Age: 47
End: 2022-06-14
Payer: COMMERCIAL

## 2022-06-14 ENCOUNTER — TELEPHONE (OUTPATIENT)
Dept: CARDIOLOGY | Facility: CLINIC | Age: 47
End: 2022-06-14
Payer: COMMERCIAL

## 2022-06-14 NOTE — TELEPHONE ENCOUNTER
"Weight increase of 4.5 lb since yesterday. Patient reports she is up 10 lbs over the last week. Physically, patient notes she is \"feeling fine\", but is struggling mentally because she knows her weight gain is from eating take-out 4 days in a row. Patient reports she had steak bites, a club sandwich, and cheesecake yesterday. Encouraged patient to refrain from frequent meals out and patient notes that she is doing her best. She denies any increased shortness of breath or fatigue. Will forward to Dr. Sanchez for review. -luzmaria     ---- Current Patient Metrics ----   Blood Pressure: 112/71, 73bpm   Pulseox: -%, -bpm   Weight: 341.5lbs   Note Created at: 06/14/2022 02:06 PM ET ---- Time-Spent: 5 minutes 0 seconds  "

## 2022-06-14 NOTE — TELEPHONE ENCOUNTER
----- Message -----  From: Raúl Sanchez MD  Sent: 6/14/2022   2:44 PM CDT  To: Krysta Torres RN    Thanks Krysta. Please instruct Bess to stake metolazone 2.5 mg x 1 dose with a BMP the following day.    ~ Daniela      ==  Bess refuses to take Metolazone at this time because she cannot make it in to a lab for appt.   Reviewed that it is important to stay away from takeout, especially if she will not follow recommendations provided.   Patient verbalized understanding.   No further questions or concerns. -ejb

## 2022-06-14 NOTE — TELEPHONE ENCOUNTER
RN called and left a detailed VM on Aylin's confidential VM of the requested orders as approved below.     Summer Lizarraga, MARTHAN, RN, PHN

## 2022-06-14 NOTE — TELEPHONE ENCOUNTER
Reason for Call:  Home Health Care    Aylin with [facility] called regarding (reason for call): Please call with verbal orders - OK to leave detailed message    Orders are needed for this patient.     Skilled Nursing: one more skilled nurse visit for discharge next week     Pt Provider: Jeremy    Phone Number Homecare Nurse can be reached at: [phone]    Can we leave a detailed message on this number? YES    Phone number patient can be reached at: Home number on file [phone] (home)    Best Time: any    Call taken on 6/14/2022 at 10:04 AM by Jewels Stevens

## 2022-06-15 ENCOUNTER — MEDICAL CORRESPONDENCE (OUTPATIENT)
Dept: HEALTH INFORMATION MANAGEMENT | Facility: CLINIC | Age: 47
End: 2022-06-15

## 2022-06-16 ENCOUNTER — TELEPHONE (OUTPATIENT)
Dept: CARDIOLOGY | Facility: CLINIC | Age: 47
End: 2022-06-16
Payer: COMMERCIAL

## 2022-06-16 NOTE — TELEPHONE ENCOUNTER
LM for patient to remind her of the recommendations to use the Metolazone dose to assist her system in excreting the excess fluids. She didn't answer, so this was left on her voice mail along with encouragement that we are here to assist her in her management of her HF that she can count on us to be there to provide additional resources and guidance. Alexandra Mukherjee RN BSN CHFN ---- Current Patient Metrics ---- Blood Pressure: -/-, -bpm Pulseox: -%, -bpm Survey: C Weight: 338.0lbs Note Created at: 06/16/2022 05:45 PM ET ---- Time-Spent: 5 minutes 0 seconds

## 2022-06-17 ENCOUNTER — ALLIED HEALTH/NURSE VISIT (OUTPATIENT)
Dept: CARDIOLOGY | Facility: CLINIC | Age: 47
End: 2022-06-17
Payer: COMMERCIAL

## 2022-06-17 DIAGNOSIS — I50.30 NYHA CLASS 3 HEART FAILURE WITH PRESERVED EJECTION FRACTION (H): Primary | ICD-10-CM

## 2022-06-17 PROCEDURE — 99458 RPM TX MGMT EA ADDL 20 MIN: CPT | Performed by: INTERNAL MEDICINE

## 2022-06-17 PROCEDURE — 99207 PR NO CHARGE LOS: CPT | Performed by: INTERNAL MEDICINE

## 2022-06-17 PROCEDURE — 99457 RPM TX MGMT 1ST 20 MIN: CPT | Performed by: INTERNAL MEDICINE

## 2022-06-17 PROCEDURE — 99454 REM MNTR PHYSIOL PARAM 16-30: CPT | Performed by: INTERNAL MEDICINE

## 2022-06-20 ENCOUNTER — TELEPHONE (OUTPATIENT)
Dept: FAMILY MEDICINE | Facility: CLINIC | Age: 47
End: 2022-06-20

## 2022-06-20 NOTE — TELEPHONE ENCOUNTER
Reason for Call:  Other    Detailed comments: Calling to inform Dr. Luna and care team that a new area of redness has appeared on pt's right thigh. Pt noticed it a couple days ago, nurse noticed it today at visit. It is warm to the touch. Pt has a history of cellulitis. Pt's vitals are normal, no fever. Nurse will outline and take pictures of area today. Pt has a nurse visit tomorrow morning and they will look at it again tomorrow.    Phone Number Patient can be reached at: Other phone number:  [phone]  [facility]    Best Time: anytime    Can we leave a detailed message on this number? YES    Call taken on 6/20/2022 at 12:01 PM by Crystal Carolina

## 2022-06-22 NOTE — TELEPHONE ENCOUNTER
I was under the impression that someone would let us know yesterday how Bess was doing - if the redness was spreading, etc.    Mikala Luna M.D.

## 2022-06-22 NOTE — TELEPHONE ENCOUNTER
RN called and left a detailed phone message on Aimee's phone for a call back for nurses information on who to contact further for an update.     Summer Lizarraga, MARTHAN, RN, PHN

## 2022-06-23 NOTE — TELEPHONE ENCOUNTER
RN looked back to other encounter for RN number with [employer] for an update - Left VM to give a return call to the nurses at [phone].    [facility] [phone]    Summer Lizarraga, MARTHAN, RN, PHN

## 2022-06-24 ENCOUNTER — TELEPHONE (OUTPATIENT)
Dept: FAMILY MEDICINE | Facility: CLINIC | Age: 47
End: 2022-06-24

## 2022-06-24 NOTE — TELEPHONE ENCOUNTER
Reason for Call:  Home Health Care    Aimee with [facility] called regarding (reason for call): Verbal    Orders are needed for this patient.     OT: Plan of care continue lymphedema treatment  2x a week for 3 weeks  The area on right iner leg that was called in to us on 6/20/2022 is back to base line no concerns.   Phone Number Homecare Nurse can be reached at: [phone]    Can we leave a detailed message on this number? YES    Phone number patient can be reached at: Home number on file [phone] (home)    Best Time: any    Call taken on 6/24/2022 at 3:15 PM by Araseli Mo

## 2022-06-27 ENCOUNTER — MEDICAL CORRESPONDENCE (OUTPATIENT)
Dept: FAMILY MEDICINE | Facility: CLINIC | Age: 47
End: 2022-06-27

## 2022-06-27 ENCOUNTER — TRANSFERRED RECORDS (OUTPATIENT)
Dept: FAMILY MEDICINE | Facility: CLINIC | Age: 47
End: 2022-06-27

## 2022-06-27 NOTE — TELEPHONE ENCOUNTER
"Rn has reached out to 2 different professionals with no return phone call. But in a different encounter from 6/24/22 Aimee [facility] left a message that stated, \"The area on right iner leg that was called in to us on 6/20/2022 is back to base line no concerns.\"    Summer Lizarraga, MARTHAN, RN, PHN    "

## 2022-06-27 NOTE — TELEPHONE ENCOUNTER
RN called and left a detailed message on Aimee's confidential VM in the regards of the approved requested orders below.     MARTHA MorrellN, RN, PHN

## 2022-06-29 ENCOUNTER — MEDICAL CORRESPONDENCE (OUTPATIENT)
Dept: FAMILY MEDICINE | Facility: CLINIC | Age: 47
End: 2022-06-29

## 2022-06-29 NOTE — PROGRESS NOTES
Patient Quality Outreach    Patient is due for the following:   Colon Cancer Screening -  FIT  Breast Cancer Screening - Mammogram  Cervical Cancer Screening - PAP Needed  Physical  - DUE NOW    NEXT STEPS:   Schedule a yearly physical    Type of outreach:    Sent letter.      Questions for provider review:    None     NICOLE Logan LPN

## 2022-06-29 NOTE — LETTER
[facility]  [address]  [facility] [address]  [phone]       June 29, 2022    Bess Enamorado  [address] [facility] [address]        Dear Bess,    We care about your health and have reviewed your health plan and are making recommendations based on this review, to optimize your health.    You are in particular need of attention regarding:  -Breast Cancer Screening  -Colon Cancer Screening  -Cervical Cancer Screening  -Wellness (Physical) Visit       We are recommending that you:                                                                                                                 -schedule a WELLNESS (Physical) APPOINTMENT.   We will check fasting labs the same day - you should have nothing to eat except water and meds for 8-10 hours prior.                                                                                                                  -schedule a MAMMOGRAM which is due.         1 in 8 women will develop invasive breast cancer during her lifetime and it is the most common non-skin cancer in American women.  EARLY detection, new treatments, and a better understanding of the disease have increased survival rates - the 5 year survival rate in the 1960s was 63% and today it is close to 90%.         If you are under/uninsured, we recommend you contact the [employer] [facility]. They offer mammograms at no charge or on a sliding fee charge. You can schedule with them at [phone]. Please have them send us the results.           Please disregard this reminder if you have had this exam elsewhere within the last year.  It would be helpful for us to have a copy of your mammogram report in our file so that we can best coordinate your care - please contact us with when your test was done so we can update your record.                                                                                                                      -schedule a COLONOSCOPY to  look for colon cancer (due every 10 years or 5 years in higher risk situations.)        Colon cancer is now the second leading cause of cancer-related deaths in the [place] for both men and women and there are over 130,000 new cases and 50,000 deaths per year from colon cancer.  Colonoscopies can prevent 90-95% of these deaths.  Problem lesions can be removed before they ever become cancer.  This test is not only looking for cancer, but also getting rid of precancerious lesions.    If you are under/uninsured, we recommend you contact the [employer] program. [employer] is a free colorectal cancer screening program that provides colonoscopies for eligible under/uninsured [place] men and women. If you are interested in receiving a free colonoscopy, please call [employer] at [phone] (mention code [employer]) to see if you re eligible.    If you do not wish to do a colonoscopy or cannot afford to do one, at this time, there is another option. It is called a FIT test or Fecal Immunochemical Occult Blood Test (take home stool sample kit).  It does not replace the colonoscopy for colorectal cancer screening, but it can detect hidden bleeding in the lower colon.  It does need to be repeated every year and if a positive result is obtained, you would be referred for a colonoscopy.       If you have completed either one of these tests at another facility, please call with the details of when and where the tests were done and if they were normal or not. Or have the records sent to our clinic so that we can best coordinate your care.    -schedule a PAP SMEAR EXAM which is due.  Please disregard this reminder if you have had this exam elsewhere within the last year.  It would be helpful for us to have a copy of your recent pap smear report in our file so that we can best coordinate your care.    If you are under/uninsured, we recommend you contact the [employer] [facility]. They offer pap smears at no charge or on a  sliding fee charge. You can schedule with them at [phone]. Please have them send us the results.                                                                                                In addition, here is a list of due or overdue Health Maintenance reminders.    Health Maintenance Due   Topic Date Due     Heart Failure Action Plan  Never done     Pneumococcal Vaccine (2 - PCV) 06/20/2006     Mammogram  12/01/2006     Colorectal Cancer Screening  02/15/2017     Preventive Care Visit  06/26/2018     Diptheria Tetanus Pertussis (DTAP/TDAP/TD) Vaccine (5 - Td or Tdap) 04/27/2019     HPV Screening  10/20/2021     PAP Smear  10/20/2021     COVID-19 Vaccine (3 - Booster for Moderna series) 11/03/2021       To address the above recommendations, we encourage you to contact us at [phone], via [employer] or by contacting Central Scheduling toll free at [phone] 24 hours a day. They will assist you with finding the most convenient time and location..    Thank you for trusting [facility] and we appreciate the opportunity to serve you.  We look forward to supporting your healthcare needs in the future.    Healthy Regards,    Your [facility] Team

## 2022-06-30 ENCOUNTER — TELEPHONE (OUTPATIENT)
Dept: CARDIOLOGY | Facility: CLINIC | Age: 47
End: 2022-06-30

## 2022-06-30 NOTE — TELEPHONE ENCOUNTER
"Bess is contacted to discuss her ongoing wt gain. She had not responded to calls made to her earlier this week. She is available today and wanted to let us know she is \"back on track\" with her diet and trying harder to avoid takeout and high sodium pitfalls. She is 9# up from her wt a week ago.   She states her upper thighs are \"Hard\" and that this is her lymphedema.  Reinforced that we need to do better to improve how she feels and her overall prognosis.    She denies dizziness or lightheadedness, has shortness of breath with mild/mod activity. Sleeps fine, no cough or awakening unless to use the bathroom.  She has significant abdominal bloating and LE edema.    She is using Bumex 2mg daily dose. She has been resistant to using more due to her concerns with kidney function.     Alexandra Mukherjee RN BSN, CHFN    Total time 10 minutes     Key Flanagan CNP what are your recommendations ?    "

## 2022-06-30 NOTE — TELEPHONE ENCOUNTER
Please have her increase Bumex 2 mg twice a day for 3 days then continue 2 mg daily thereafter.  Kidney function was normal a few weeks ago.  Thank you

## 2022-06-30 NOTE — TELEPHONE ENCOUNTER
Bess is contacted with recommendations to increase the Bumex 2mg bid x 3 days then to resume 2mg daily dose.  Alexandra Mukherjee RN BSN, CHFN

## 2022-07-08 ENCOUNTER — TELEPHONE (OUTPATIENT)
Dept: CARDIOLOGY | Facility: CLINIC | Age: 47
End: 2022-07-08

## 2022-07-08 DIAGNOSIS — I50.32 CHRONIC DIASTOLIC CONGESTIVE HEART FAILURE (H): Primary | ICD-10-CM

## 2022-07-08 RX ORDER — METOLAZONE 2.5 MG/1
TABLET ORAL
Qty: 1 TABLET | Refills: 0 | Status: SHIPPED | OUTPATIENT
Start: 2022-07-08 | End: 2022-08-25

## 2022-07-08 NOTE — TELEPHONE ENCOUNTER
Raúl Sanchez MD Keune, Shelly A RN  Caller: Unspecified (Today,  2:51 PM)  Thanks Alexandra, please instruct Bess to take metolazone 2.5 x1 dose with follow-up BMP the next day. Please let her know that any issues with her kidneys cannot be attributed to any one medication, but to the constant stress of persistent volume overload that she subjects her organs to by persistent non-compliance. The diuretics are only a tool to aid to help the kidneys to overcome the renal congestion that is a result of excessive fluid and Na intake. Please let her know that we value her input in her care, but do look at all factors (particularly her renal function) when make recommendations. However, if she does not feel comfortable with the direction of the care, we can offer a referral for a second opinion.     ~ Daniela    Pt is left a message with recommendations to use Metolazone 2.5mg x1 dose 20-30 minutes prior to the Bumex dose.    Labs the following day.    Alexandra Mukherjee RN BSN, CHFN

## 2022-07-08 NOTE — TELEPHONE ENCOUNTER
"Bess is contacted with HRS wt gain alert today. She has been eating more take out and this has been her down fall. She is up 4.5# in 2 days and 8# in the past  10 days.    She reports feeling tired and has poor activity tolerance. She has abdominal bloating and LE edema. Denies dizziness or lightheadedness, no cough noted.   She is watching her fluids intake closely, but has fallen \"off the wagon when it comes to eating take out foods\"  Reinforced that she needs to get back on track and stay within the guidelines.    Using Bumex 2mg once a day ( Epic states 2mg bid )she has concerns of causing kidney stress so is not willing to take 2mg bid ongoing basis.      Alexandra Mukherjee RN BSN, CHFN    Dr. Sanchez what are your recommendations at this time?      Total time 10 minutes  "

## 2022-07-11 ENCOUNTER — TELEPHONE (OUTPATIENT)
Dept: FAMILY MEDICINE | Facility: CLINIC | Age: 47
End: 2022-07-11

## 2022-07-11 ENCOUNTER — TELEPHONE (OUTPATIENT)
Dept: CARDIOLOGY | Facility: CLINIC | Age: 47
End: 2022-07-11

## 2022-07-11 NOTE — TELEPHONE ENCOUNTER
Pt and Aimee veras [facility] are contacted to offer lab orders for BMP for the next day after the Metolazone dose. Advised to seek orders for Home Care from the PCP office.     Lab orders are faxed.   To [phone]    Alexandra Mukherjee RN BSN, CHFN

## 2022-07-11 NOTE — TELEPHONE ENCOUNTER
----- Message from Ivania Nolen sent at 7/11/2022  3:57 PM CDT -----  Regarding: KATHRIN pt  General phone call:    Caller: [facility]   Primary cardiologist: KATHRIN   Detailed reason for call: She is with the patient and patient wasn't able to  the medication Metrolazol and she wasn't able to take the blood test.  She wasn't able to talk to the nurse since a message was left for her to tell her she is home bound    Best phone number: [phone]  Best time to contact: any  Ok to leave a detailedmessage? yes      Additional Info:

## 2022-07-11 NOTE — TELEPHONE ENCOUNTER
Reason for Call:  Other    Detailed comments: Verbal orders for home RN evaluation. New med teaching. And lab draw per recommendations from pt's cardiologist.     Also needing verbal orders for OT continuation of plan of care for bilateral lower extremity lymphedema treatment, starting 7/18.    Phone Number Patient can be reached at: Other phone number:  [phone]  [facility]imee Fitz Hung Tanner    Best Time: anytime    Can we leave a detailed message on this number? YES    Call taken on 7/11/2022 at 4:10 PM by Crystal Carolina

## 2022-07-13 ENCOUNTER — MEDICAL CORRESPONDENCE (OUTPATIENT)
Dept: FAMILY MEDICINE | Facility: CLINIC | Age: 47
End: 2022-07-13

## 2022-07-13 ENCOUNTER — TELEPHONE (OUTPATIENT)
Dept: CARDIOLOGY | Facility: CLINIC | Age: 47
End: 2022-07-13

## 2022-07-13 NOTE — PROGRESS NOTES
[facility]: [facility] Routine Remote Evaluation    Bess is a patient diagnosed with HFpEF started with HRS Remote Monitoring on 3/16/22.  She  has been compliant with daily HRS Monitoring during the period of 5/18/22 through 6/17/22, and is engaged in the management of his/her heart failure.     In agreement with the ordering provider, the following parameters were used to monitor her. Any metrics outside of these parameters resulted in a call to the patient to assess for heart failure symptoms and notification of the patients provider.     Patient will be enrolled in [employer]) technology. Nursing staff will monitor metric input and heart failure symptom questions daily via Clinician Connect Portal. Nursing will notify the heart failure provider if metrics fall outside the following parameters:  Notified for diastolic blood pressure greater than 100 and less than 50  Notified for systolic blood pressure greater than 160 and less than 80  Notified for heart rate greater than 100 and less than 50   Notified for pulse oximeter reading less than 90%  Notified of weight increase of 2 pounds in 1 days   Notified of weight increase of 5 pounds in 7 days   Notified of weight increase of 10 pounds from baseline weight  Patient will be prompted to answer daily symptom questions. Unfavorable questions will be addressed by nursing staff.   1. Have you experienced any increased shortness of breath with daily activity in the past 24 hours?   2. Have you had any increased or new swelling in your ankles, feet, or other body parts in the last 24 hours?   3. Have you experienced any increased tiredness or fatigue in the last 24 hours?          The following alerts/out of range interactions occurred during this monitoring period:    5/24/22 Weight alert   5/31/22 Weight alert   6/13/22 Weight alert   6/14/22 Weight alert   6/16/22 Weight alert       Next Visit: To be scheduled  Will  continue with weekly monitoring with HF provider team.     TOTAL INTERACTIVE COMMUNICATION WITH PATIENT DURING THIS BILLING PERIOD: 45 minutes.      READINGS:

## 2022-07-13 NOTE — TELEPHONE ENCOUNTER
Bess is contacted today to check with her to see how she is feeling, and noted wt increase today. She was advised to take Metolazone a few days ago, but has not obtained this yet from Pharmacy. She reports that she is home bound and did not have the medication yet. She was waiting to see if the nurse is going to pick it up so she could take it. She will arrange labs the following day as well.    Alexandra Mukherjee RN BSN, CHFN    Total time 10 minutes

## 2022-07-16 LAB
ANION GAP SERPL CALCULATED.3IONS-SCNC: 11 MMOL/L (ref 5–18)
BUN SERPL-MCNC: 18 MG/DL (ref 8–22)
CALCIUM SERPL-MCNC: 10.1 MG/DL (ref 8.5–10.5)
CHLORIDE BLD-SCNC: 99 MMOL/L (ref 98–107)
CO2 SERPL-SCNC: 27 MMOL/L (ref 22–31)
CREAT SERPL-MCNC: 0.8 MG/DL (ref 0.6–1.1)
GFR SERPL CREATININE-BSD FRML MDRD: >90 ML/MIN/1.73M2
GLUCOSE BLD-MCNC: 87 MG/DL (ref 70–125)
POTASSIUM BLD-SCNC: 3.4 MMOL/L (ref 3.5–5)
SODIUM SERPL-SCNC: 137 MMOL/L (ref 136–145)

## 2022-07-16 PROCEDURE — 36415 COLL VENOUS BLD VENIPUNCTURE: CPT

## 2022-07-16 PROCEDURE — 80048 BASIC METABOLIC PNL TOTAL CA: CPT

## 2022-07-18 ENCOUNTER — ALLIED HEALTH/NURSE VISIT (OUTPATIENT)
Dept: CARDIOLOGY | Facility: CLINIC | Age: 47
End: 2022-07-18
Payer: COMMERCIAL

## 2022-07-18 ENCOUNTER — MEDICAL CORRESPONDENCE (OUTPATIENT)
Dept: FAMILY MEDICINE | Facility: CLINIC | Age: 47
End: 2022-07-18

## 2022-07-18 DIAGNOSIS — I50.32 CHRONIC DIASTOLIC CONGESTIVE HEART FAILURE (H): ICD-10-CM

## 2022-07-18 DIAGNOSIS — I50.32 CHRONIC HEART FAILURE WITH PRESERVED EJECTION FRACTION (H): Primary | ICD-10-CM

## 2022-07-18 PROCEDURE — 99207 PR NO CHARGE LOS: CPT | Performed by: INTERNAL MEDICINE

## 2022-07-18 PROCEDURE — 99454 REM MNTR PHYSIOL PARAM 16-30: CPT | Performed by: INTERNAL MEDICINE

## 2022-07-18 PROCEDURE — 99457 RPM TX MGMT 1ST 20 MIN: CPT | Performed by: INTERNAL MEDICINE

## 2022-07-20 ENCOUNTER — LAB REQUISITION (OUTPATIENT)
Dept: LAB | Facility: CLINIC | Age: 47
End: 2022-07-20
Payer: COMMERCIAL

## 2022-07-20 ENCOUNTER — TELEPHONE (OUTPATIENT)
Dept: CARDIOLOGY | Facility: CLINIC | Age: 47
End: 2022-07-20

## 2022-07-20 DIAGNOSIS — E66.01 MORBID (SEVERE) OBESITY DUE TO EXCESS CALORIES (H): ICD-10-CM

## 2022-07-20 DIAGNOSIS — N18.30 CHRONIC KIDNEY DISEASE, STAGE 3 UNSPECIFIED (H): ICD-10-CM

## 2022-07-20 LAB
ALBUMIN SERPL-MCNC: 3.4 G/DL (ref 3.4–5)
ANION GAP SERPL CALCULATED.3IONS-SCNC: 4 MMOL/L (ref 3–14)
BUN SERPL-MCNC: 19 MG/DL (ref 7–30)
CALCIUM SERPL-MCNC: 9.3 MG/DL (ref 8.5–10.1)
CHLORIDE BLD-SCNC: 99 MMOL/L (ref 94–109)
CO2 SERPL-SCNC: 33 MMOL/L (ref 20–32)
CREAT SERPL-MCNC: 0.62 MG/DL (ref 0.52–1.04)
GFR SERPL CREATININE-BSD FRML MDRD: >90 ML/MIN/1.73M2
GLUCOSE BLD-MCNC: 128 MG/DL (ref 70–99)
PHOSPHATE SERPL-MCNC: 2.1 MG/DL (ref 2.5–4.5)
POTASSIUM BLD-SCNC: 3.1 MMOL/L (ref 3.4–5.3)
SODIUM SERPL-SCNC: 136 MMOL/L (ref 133–144)

## 2022-07-20 PROCEDURE — 36415 COLL VENOUS BLD VENIPUNCTURE: CPT | Mod: ORL | Performed by: INTERNAL MEDICINE

## 2022-07-20 PROCEDURE — 80069 RENAL FUNCTION PANEL: CPT | Mod: ORL | Performed by: INTERNAL MEDICINE

## 2022-07-20 PROCEDURE — 83970 ASSAY OF PARATHORMONE: CPT | Mod: ORL | Performed by: INTERNAL MEDICINE

## 2022-07-20 NOTE — TELEPHONE ENCOUNTER
Discussed concerns with Delmar advised that no redraw was indicated at this time.  Alexandra Mukherjee RN BSN, CHFN

## 2022-07-20 NOTE — TELEPHONE ENCOUNTER
[facility] Call Center    Phone Message    May a detailed message be left on voicemail: yes     Reason for Call: Other: Luisa called on behalf of the patient looking to discuss the basic metabolic panel with a member of her care team. Please call Luisa back to discuss further, thank you.     Action Taken: Message routed to:  Other: Cardiology    Travel Screening: Not Applicable

## 2022-07-21 LAB — PTH-INTACT SERPL-MCNC: 61 PG/ML (ref 15–65)

## 2022-07-25 ENCOUNTER — OFFICE VISIT (OUTPATIENT)
Dept: DERMATOLOGY | Facility: CLINIC | Age: 47
End: 2022-07-25
Attending: FAMILY MEDICINE
Payer: COMMERCIAL

## 2022-07-25 DIAGNOSIS — M79.3 LIPODERMATOSCLEROSIS OF BOTH LOWER EXTREMITIES: Primary | ICD-10-CM

## 2022-07-25 PROCEDURE — 99203 OFFICE O/P NEW LOW 30 MIN: CPT | Performed by: DERMATOLOGY

## 2022-07-25 ASSESSMENT — PAIN SCALES - GENERAL: PAINLEVEL: NO PAIN (0)

## 2022-07-25 NOTE — NURSING NOTE
Chief Complaint   Patient presents with     Derm Problem     spots        Lorna Arteaga on 7/25/2022 at 1:11 PM

## 2022-07-25 NOTE — LETTER
7/25/2022         RE: Bess Enamorado  [address] [facility] [address]        Dear Colleague,    Thank you for referring your patient, Bess Enamorado, to the [facility]. Please see a copy of my visit note below.    Bess Enamorado , a 46 year old year old female patient, I was asked to see by Dr. Luna for calciphylaxis.  Patient states this has been present for years.  Patient reports the following symptoms:  none .  Patient reports the following previous treatments none.  Patient reports the following modifying factors none.  Associated symptoms: none.  Patient has no other skin complaints today.  Remainder of the HPI, Meds, PMH, Allergies, FH, and SH was reviewed in chart.      Past Medical History:   Diagnosis Date     Acute on chronic diastolic congestive heart failure (H) 02/09/2022     Arthritis 2019    Hips     ASCUS favor benign 08/2015    Neg HPV     Depressive disorder 2010     H/O colposcopy with cervical biopsy 09/14/2015    Bx & ECC - negative     Hypertension 2019     LSIL on Pap smear 07/2014    Neg high risk HPV     Multiple sclerosis (H) 08/29/2005 9/18/200pt dx with MS 2004--dx by neurolgist and is followed by Dr. Harris     Myocardial infarction (H)      Obese      Shingles 10/2016     Sleep apnea      UNSPEC CONSTIPATION 09/18/2006 9/18/2006   Has tried otc suppository and otc lax.        Past Surgical History:   Procedure Laterality Date     CHOLECYSTECTOMY, LAPOROSCOPIC      Cholecystectomy, Laparoscopic     COLONOSCOPY  2007?    Bathroom issue..results normal     COMBINED CYSTOSCOPY, RETROGRADES, EXCHANGE STENT URETER(S) Right 2/21/2022    Procedure: CYSTOSCOPY, WITH right RETROGRADE PYELOGRAM AND right URETERAL STENT PLACEMENT;  Surgeon: Doyle Palomares MD;  Location: [facility] OR     OPEN REDUCTION INTERNAL FIXATION TOE(S)  4/13/2012    Procedure:OPEN REDUCTION INTERNAL FIXATION TOE(S); Open reduction internal fixation right proximal fifth metatarsal  fracture-   Anes-choice block; Surgeon:LEY, JEFFREY DUANE; Location:[place] OR     PICC TRIPLE LUMEN PLACEMENT  2/21/2022             Family History   Problem Relation Age of Onset     Neurologic Disorder Father         MS     Heart Disease Father         irregular heart rate     Diabetes Father      Melanoma Father      Sleep Apnea Father      Other Cancer Father      Cancer Maternal Grandfather         lung     Other Cancer Maternal Grandfather      Cancer Paternal Grandmother         stomach     Other Cancer Paternal Grandmother      Cancer Mother      Other Cancer Mother      Thyroid Disease Mother      Gynecology Maternal Aunt         PCOS     Hypertension Maternal Grandmother      Anxiety Disorder Maternal Grandmother      Thyroid Disease Maternal Grandmother      Anxiety Disorder Sister        Social History     Socioeconomic History     Marital status: Single     Spouse name: Not on file     Number of children: Not on file     Years of education: Not on file     Highest education level: Not on file   Occupational History     Employer: [employer]   Tobacco Use     Smoking status: Never Smoker     Smokeless tobacco: Never Used   Vaping Use     Vaping Use: Never used   Substance and Sexual Activity     Alcohol use: Yes     Comment: social     Drug use: No     Sexual activity: Not Currently     Partners: Male     Birth control/protection: Pill   Other Topics Concern     Parent/sibling w/ CABG, MI or angioplasty before 65F 55M? No   Social History Narrative    , 3rd-5th grade     Social Determinants of Health     Financial Resource Strain: Low Risk      Difficulty of Paying Living Expenses: Not very hard   Food Insecurity: No Food Insecurity     Worried About Running Out of Food in the Last Year: Never true     Ran Out of Food in the Last Year: Never true   Transportation Needs: No Transportation Needs     Lack of Transportation (Medical): No     Lack of Transportation (Non-Medical): No    Physical Activity: Inactive     Days of Exercise per Week: 0 days     Minutes of Exercise per Session: 0 min   Stress: Not on file   Social Connections: Unknown     Frequency of Communication with Friends and Family: Twice a week     Frequency of Social Gatherings with Friends and Family: Twice a week     Attends Druze Services: Not on file     Active Member of Clubs or Organizations: Not on file     Attends Club or Organization Meetings: Not on file     Marital Status: Not on file   Intimate Partner Violence: Not At Risk     Fear of Current or Ex-Partner: No     Emotionally Abused: No     Physically Abused: No     Sexually Abused: No   Housing Stability: Not on file       Outpatient Encounter Medications as of 7/25/2022   Medication Sig Dispense Refill     ACE/ARB/ARNI NOT PRESCRIBED (INTENTIONAL) Please choose reason not prescribed from choices below.       acetaminophen (TYLENOL) 325 MG tablet Take 2 tablets (650 mg) by mouth every 4 hours as needed for mild pain  0     ASPIRIN NOT PRESCRIBED (INTENTIONAL) Please choose reason not prescribed from choices below.       bumetanide (BUMEX) 2 MG tablet Take 1 tablet (2 mg) by mouth 2 times daily (Patient taking differently: Take 2 mg by mouth daily) 120 tablet 3     Cholecalciferol (VITAMIN D3 PO) Take 10,000 Units by mouth daily        cinacalcet (SENSIPAR) 30 MG tablet Take 1 tablet (30 mg) by mouth three times a week 30 tablet 0     desogestrel-ethinyl estradiol (APRI) 0.15-30 MG-MCG tablet TAKE 1 TABLET DAILY CONTINUOUSLY-OMIT PLACEBPO TABS UNTIL AFTER 3 MONTHS OF HORMONE TABS 112 tablet 3     gabapentin (NEURONTIN) 100 MG capsule Take 1 capsule (100 mg) by mouth in the morning and 1 capsule (100 mg) at noon and 1 capsule (100 mg) in the evening. 540 capsule 1     metolazone (ZAROXOLYN) 2.5 MG tablet 2.5mg tablet one time 20-30 minutes prior to next Bumex dose , labs the following day 1 tablet 0     metoprolol succinate ER (TOPROL-XL) 25 MG 24 hr tablet TAKE  1 TABLET(25 MG) BY MOUTH DAILY 90 tablet 1     miconazole (MICATIN) 2 % external powder Apply topically 2 times daily (Patient taking differently: Apply topically 2 times daily as needed)       sertraline (ZOLOFT) 100 MG tablet Take 1 tablet (100 mg) by mouth daily 90 tablet 3     sevelamer carbonate (RENVELA) 800 MG tablet Take 1 tablet (800 mg) by mouth in the morning. 90 tablet 0     No facility-administered encounter medications on file as of 7/25/2022.             Review Of Systems  Skin: As above  Eyes: negative  Ears/Nose/Throat: negative  Respiratory: No shortness of breath, dyspnea on exertion, cough, or hemoptysis  Cardiovascular: negative  Gastrointestinal: negative  Genitourinary: negative  Musculoskeletal: negative  Neurologic: negative  Psychiatric: negative  Hematologic/Lymphatic/Immunologic: negative  Endocrine: negative      O:   NAD, WDWN, Alert & Oriented, Mood & Affect wnl, Vitals stable   Here today alone   General appearance kateryna ii   Vitals stable  Alert, oriented and in no acute distress  BL legs pitting edema and verrucous plaques        Eyes: Conjunctivae/lids:Normal     ENT: Lips, buccal mucosa, tongue: normal    MSK:Normal    Cardiovascular: peripheral edema yes    Pulm: Breathing Normal    Neuro/Psych: Orientation:Normal; Mood/Affect:Normal      A/P:  1. Stasis changes, lipo dermatosclerosis    To reduce swelling in the leg:  She is getting home compression machine which helps    Don't stand for long periods.     Take regular walks.     Elevate your feet when sitting: if your legs are swollen they need to be above your hips to drain effectively.     Elevate the foot of your bed overnight.     Once the dermatitis is under control, wear special graduated compression stockings long term.    To treat the dermatitis:     Use vanicream daily.    Try not to scratch: it keeps the dermatitis going.     Protect your skin from injury: this can result in infection or ulceration.    Vinegar is 5%  acetic acid. Make a 1% solution by adding   cup of vinegar (white or brown) to 1 pint of water.    Pathophysiology discussed with pateint   Trental and stanazol discussed with patient she declines  It was a pleasure speaking to Bess Enamorado today.  Previous clinic  notes and pertinent laboratory tests were reviewed prior to Bess Enamorado's visit.  Return to clinic prn         Again, thank you for allowing me to participate in the care of your patient.        Sincerely,        Frank Farmer MD

## 2022-07-25 NOTE — PATIENT INSTRUCTIONS
To reduce swelling in the leg   Don't stand for long periods.   Take regular walks.   Elevate your feet when sitting: if your legs are swollen they need to be above your hips to drain effectively.   Elevate the foot of your bed overnight.   Wear graduated compression stockings during the day  Vinegar is 5% acetic acid. Make a 1% solution by adding   cup of vinegar (white or brown) to 1 pint of water.        Proper skin care from Dr. Farmer- [facility]                  Eliminate harsh soaps, i.e. Dial, Zest, [place];             Use mild soaps such as Cetaphil or Dove Sensitive Skin             Avoid hot or cold showers             After showering, lightly dry off.              Aggressive use of a moisturizer (including Vanicream, Cetaphil, Aquaphor or Cerave)   We recommend using a tub that needs to be scooped out, not a pump. This has more of an oil base. It will hold moisture in your skin much better than a water base moisturizer. The ones recommended are non- pore clogging.                  If you have any questions call [phone] and follow the prompts to Dr. Farmer's office.

## 2022-07-25 NOTE — PROGRESS NOTES
Bess Enamorado , a 46 year old year old female patient, I was asked to see by Dr. Luna for calciphylaxis.  Patient states this has been present for years.  Patient reports the following symptoms:  none .  Patient reports the following previous treatments none.  Patient reports the following modifying factors none.  Associated symptoms: none.  Patient has no other skin complaints today.  Remainder of the HPI, Meds, PMH, Allergies, FH, and SH was reviewed in chart.      Past Medical History:   Diagnosis Date     Acute on chronic diastolic congestive heart failure (H) 02/09/2022     Arthritis 2019    Hips     ASCUS favor benign 08/2015    Neg HPV     Depressive disorder 2010     H/O colposcopy with cervical biopsy 09/14/2015    Bx & ECC - negative     Hypertension 2019     LSIL on Pap smear 07/2014    Neg high risk HPV     Multiple sclerosis (H) 08/29/2005 9/18/200pt dx with MS 2004--dx by neurolgist and is followed by Dr. Harris     Myocardial infarction (H)      Obese      Shingles 10/2016     Sleep apnea      UNSPEC CONSTIPATION 09/18/2006 9/18/2006   Has tried otc suppository and otc lax.        Past Surgical History:   Procedure Laterality Date     CHOLECYSTECTOMY, LAPOROSCOPIC      Cholecystectomy, Laparoscopic     COLONOSCOPY  2007?    Bathroom issue..results normal     COMBINED CYSTOSCOPY, RETROGRADES, EXCHANGE STENT URETER(S) Right 2/21/2022    Procedure: CYSTOSCOPY, WITH right RETROGRADE PYELOGRAM AND right URETERAL STENT PLACEMENT;  Surgeon: Doyle Palomares MD;  Location: [facility] OR     OPEN REDUCTION INTERNAL FIXATION TOE(S)  4/13/2012    Procedure:OPEN REDUCTION INTERNAL FIXATION TOE(S); Open reduction internal fixation right proximal fifth metatarsal fracture-   Anes-choice block; Surgeon:LEY, JEFFREY DUANE; Location:[place] OR     PICC TRIPLE LUMEN PLACEMENT  2/21/2022             Family History   Problem Relation Age of Onset     Neurologic Disorder Father         MS     Heart Disease Father          irregular heart rate     Diabetes Father      Melanoma Father      Sleep Apnea Father      Other Cancer Father      Cancer Maternal Grandfather         lung     Other Cancer Maternal Grandfather      Cancer Paternal Grandmother         stomach     Other Cancer Paternal Grandmother      Cancer Mother      Other Cancer Mother      Thyroid Disease Mother      Gynecology Maternal Aunt         PCOS     Hypertension Maternal Grandmother      Anxiety Disorder Maternal Grandmother      Thyroid Disease Maternal Grandmother      Anxiety Disorder Sister        Social History     Socioeconomic History     Marital status: Single     Spouse name: Not on file     Number of children: Not on file     Years of education: Not on file     Highest education level: Not on file   Occupational History     Employer: [employer]   Tobacco Use     Smoking status: Never Smoker     Smokeless tobacco: Never Used   Vaping Use     Vaping Use: Never used   Substance and Sexual Activity     Alcohol use: Yes     Comment: social     Drug use: No     Sexual activity: Not Currently     Partners: Male     Birth control/protection: Pill   Other Topics Concern     Parent/sibling w/ CABG, MI or angioplasty before 65F 55M? No   Social History Narrative    , 3rd-5th grade     Social Determinants of Health     Financial Resource Strain: Low Risk      Difficulty of Paying Living Expenses: Not very hard   Food Insecurity: No Food Insecurity     Worried About Running Out of Food in the Last Year: Never true     Ran Out of Food in the Last Year: Never true   Transportation Needs: No Transportation Needs     Lack of Transportation (Medical): No     Lack of Transportation (Non-Medical): No   Physical Activity: Inactive     Days of Exercise per Week: 0 days     Minutes of Exercise per Session: 0 min   Stress: Not on file   Social Connections: Unknown     Frequency of Communication with Friends and Family: Twice a week     Frequency  of Social Gatherings with Friends and Family: Twice a week     Attends Yazdanism Services: Not on file     Active Member of Clubs or Organizations: Not on file     Attends Club or Organization Meetings: Not on file     Marital Status: Not on file   Intimate Partner Violence: Not At Risk     Fear of Current or Ex-Partner: No     Emotionally Abused: No     Physically Abused: No     Sexually Abused: No   Housing Stability: Not on file       Outpatient Encounter Medications as of 7/25/2022   Medication Sig Dispense Refill     ACE/ARB/ARNI NOT PRESCRIBED (INTENTIONAL) Please choose reason not prescribed from choices below.       acetaminophen (TYLENOL) 325 MG tablet Take 2 tablets (650 mg) by mouth every 4 hours as needed for mild pain  0     ASPIRIN NOT PRESCRIBED (INTENTIONAL) Please choose reason not prescribed from choices below.       bumetanide (BUMEX) 2 MG tablet Take 1 tablet (2 mg) by mouth 2 times daily (Patient taking differently: Take 2 mg by mouth daily) 120 tablet 3     Cholecalciferol (VITAMIN D3 PO) Take 10,000 Units by mouth daily        cinacalcet (SENSIPAR) 30 MG tablet Take 1 tablet (30 mg) by mouth three times a week 30 tablet 0     desogestrel-ethinyl estradiol (APRI) 0.15-30 MG-MCG tablet TAKE 1 TABLET DAILY CONTINUOUSLY-OMIT PLACEBPO TABS UNTIL AFTER 3 MONTHS OF HORMONE TABS 112 tablet 3     gabapentin (NEURONTIN) 100 MG capsule Take 1 capsule (100 mg) by mouth in the morning and 1 capsule (100 mg) at noon and 1 capsule (100 mg) in the evening. 540 capsule 1     metolazone (ZAROXOLYN) 2.5 MG tablet 2.5mg tablet one time 20-30 minutes prior to next Bumex dose , labs the following day 1 tablet 0     metoprolol succinate ER (TOPROL-XL) 25 MG 24 hr tablet TAKE 1 TABLET(25 MG) BY MOUTH DAILY 90 tablet 1     miconazole (MICATIN) 2 % external powder Apply topically 2 times daily (Patient taking differently: Apply topically 2 times daily as needed)       sertraline (ZOLOFT) 100 MG tablet Take 1 tablet  (100 mg) by mouth daily 90 tablet 3     sevelamer carbonate (RENVELA) 800 MG tablet Take 1 tablet (800 mg) by mouth in the morning. 90 tablet 0     No facility-administered encounter medications on file as of 7/25/2022.             Review Of Systems  Skin: As above  Eyes: negative  Ears/Nose/Throat: negative  Respiratory: No shortness of breath, dyspnea on exertion, cough, or hemoptysis  Cardiovascular: negative  Gastrointestinal: negative  Genitourinary: negative  Musculoskeletal: negative  Neurologic: negative  Psychiatric: negative  Hematologic/Lymphatic/Immunologic: negative  Endocrine: negative      O:   NAD, WDWN, Alert & Oriented, Mood & Affect wnl, Vitals stable   Here today alone   General appearance kateryna ii   Vitals stable  Alert, oriented and in no acute distress  BL legs pitting edema and verrucous plaques        Eyes: Conjunctivae/lids:Normal     ENT: Lips, buccal mucosa, tongue: normal    MSK:Normal    Cardiovascular: peripheral edema yes    Pulm: Breathing Normal    Neuro/Psych: Orientation:Normal; Mood/Affect:Normal      A/P:  1. Stasis changes, lipo dermatosclerosis    To reduce swelling in the leg:  She is getting home compression machine which helps    Don't stand for long periods.     Take regular walks.     Elevate your feet when sitting: if your legs are swollen they need to be above your hips to drain effectively.     Elevate the foot of your bed overnight.     Once the dermatitis is under control, wear special graduated compression stockings long term.    To treat the dermatitis:     Use vanicream daily.    Try not to scratch: it keeps the dermatitis going.     Protect your skin from injury: this can result in infection or ulceration.    Vinegar is 5% acetic acid. Make a 1% solution by adding   cup of vinegar (white or brown) to 1 pint of water.    Pathophysiology discussed with pateint   Trental and stanazol discussed with patient she declines  It was a pleasure speaking to Bess Enamorado  today.  Previous clinic  notes and pertinent laboratory tests were reviewed prior to Bess Enamorado's visit.  Return to clinic prn

## 2022-07-26 ENCOUNTER — TELEPHONE (OUTPATIENT)
Dept: FAMILY MEDICINE | Facility: CLINIC | Age: 47
End: 2022-07-26

## 2022-07-26 NOTE — TELEPHONE ENCOUNTER
Writer left detailed message informing Luisa Gaviria RN that she had the verbal orders for the stop and start of care.    Preet ARGUELLES [facility]

## 2022-07-26 NOTE — TELEPHONE ENCOUNTER
khurram with [facility] home care called with requests for new orders. She reports that patient is changing insurances on 08/01-- as a result she needs a order to discontinue care with current insurance/[facility]. And on 08/01- she will be with medicare and needs new orders to open homecare for continued service-- occupational therapy for lymphedema and nursing for lab draws as needed.      Please call with verbal approval.       Ayana FRENCH  Station

## 2022-07-26 NOTE — TELEPHONE ENCOUNTER
Reviewed chart and patient did receive verbal OK for these same orders from Dr. Luna 5/17/22 Last seen 5/11/2022.    I can give verbal OK for this  If orders need signing, those go to Dr. Luna.    Sandra Newell MD  Covering for Dr. Luna

## 2022-07-31 ENCOUNTER — NURSE TRIAGE (OUTPATIENT)
Dept: NURSING | Facility: CLINIC | Age: 47
End: 2022-07-31

## 2022-07-31 NOTE — TELEPHONE ENCOUNTER
Call back from [facility].    She is asking for verbal ok today since the nurse will be seeing patient at 9 am in the morning tomorrow. Home care nurse will call back with specific skilled nursing order.    Gave verbal ok so nurse can see patient tomorrow since this is a continuation of care.          Marvin Flower RN/[facility]

## 2022-07-31 NOTE — TELEPHONE ENCOUNTER
Reason for Call:  Home Health Care   orders  with  [facility]  .  Patient is changing insurance company on 8/1 and will a new set of OT and RN orders      Homecare called regarding (reason for call): Verbal orders     Orders are needed  patient.  OT and  Skilled Nursing, ST        OT: [facility]     Skilled Nursing: [facility]  Pt Provider:      Phone Number Homecare Nurse can be reached at: [phone]     Can we leave a detailed message on this number?  yes    Writer will send message PCP.    Zora Ríos RN, [facility] Triage Nurse Advisor    Reason for Disposition    [1] Caller requesting NON-URGENT health information AND [2] PCP's office is the best resource    Protocols used: INFORMATION ONLY CALL - NO TRIAGE-A-

## 2022-08-01 ENCOUNTER — MEDICAL CORRESPONDENCE (OUTPATIENT)
Dept: FAMILY MEDICINE | Facility: CLINIC | Age: 47
End: 2022-08-01

## 2022-08-04 ENCOUNTER — TELEPHONE (OUTPATIENT)
Dept: CARDIOLOGY | Facility: CLINIC | Age: 47
End: 2022-08-04

## 2022-08-04 NOTE — TELEPHONE ENCOUNTER
I recommend she take metolazone 2.5 mg x 1 dose.  Please see what Bumex dose she is taking.  We will need to adjust Bumex also.  Please update me after you speak with her.  Thank you

## 2022-08-04 NOTE — TELEPHONE ENCOUNTER
If she is refusing metolazone and I recommend increasing Bumex to 4 mg in the morning and 2 mg in the afternoon for 3 days then continue 2 mg twice a day thereafter.  Thank you

## 2022-08-04 NOTE — TELEPHONE ENCOUNTER
Bess returned the call and recommendations were shared with her to increase the Bumex dose to 4mg in AM and 2mg in PM x 3 days; then use Bumex 2mg bid thereafter.   She appears agreeable to this plan.   Alexandra Mukherjee RN BSN, [facility]

## 2022-08-04 NOTE — TELEPHONE ENCOUNTER
Left VM with return number for Pt regarding concerning weight increase noted on HRS. Pt reported on 8/3 that she was aware her weight was going up and reported that the next day would be higher. Pt does have an eating disorder in which she binge eats and has trouble controlling this. She has PRN Metolazone 2.5mg tablet one time 20-30 minutes prior to next Bumex dose, labs the following day. She has orders to take Bumex 1 tablet (2 mg) by mouth 2 times daily but medication list shows a note she is taking her Bumex 1 time a day only.   Recent weights as follows:   8/4 No Reading(s)  8/3 344  8/2 340.5  8/1 339  7/31 No Reading(s)  7/30 No Reading(s)  7/29 328.5  7/28 332  7/27 337  7/26 335.5  7/25 336  7/24 No Reading(s)  7/23 No Reading(s)  7/22 332    FYI to Key Cifuentes RN    Total time 10

## 2022-08-04 NOTE — TELEPHONE ENCOUNTER
"Pt called back and reported she had take-out quite a few days in a row. She admits to having an eating disorder and depression and did not have the energy or motivation to prep meals. Pt confirmed she is taking scheduled bumex 2mg BID as it appears on her medication list. She denied wanting to take her metolazone because she had to sit by her toilet for 7 hours straight due to not having the capability to hold her bladder long enough to leave the bathroom last time she took it. Discussed the risk extensively of not taking this and the immense strain this will take on her heart. Despite verbalizing understanding, she continued to refuse metolazone dose. She did agree to stop eating take-out and will be compliant with low sodium diet and reports she started being compliant with diet yesterday. She also reported her parents will be spending the weekend with her and she reports eating much healthier when she has company. Urged pt to call if she starts to experience any symptoms. She did dominique \"No\" to all survey questions on HRS and weight is down 1lb from yesterday to 343lbs from 344lbs. Encouraged Pt to reconsider taking her dose of metolazone and call clinic back if she chooses to take. Pt agreed to call if she changes her mind.     DEBRA to Key Cifuentes RN     Total time 15 minutes  "

## 2022-08-05 ENCOUNTER — E-VISIT (OUTPATIENT)
Dept: FAMILY MEDICINE | Facility: CLINIC | Age: 47
End: 2022-08-05
Payer: MEDICARE

## 2022-08-05 DIAGNOSIS — R19.7 DIARRHEA, UNSPECIFIED TYPE: Primary | ICD-10-CM

## 2022-08-05 PROCEDURE — 99207 PR NON-BILLABLE SERV PER CHARTING: CPT | Performed by: FAMILY MEDICINE

## 2022-08-09 ENCOUNTER — TELEPHONE (OUTPATIENT)
Dept: CARDIOLOGY | Facility: CLINIC | Age: 47
End: 2022-08-09

## 2022-08-09 NOTE — TELEPHONE ENCOUNTER
Pt called back to discuss weight changes.  8/9 337.5  8/8 334.5  8/7 330.5. She reports this was not accurate and said she took it again and was 336, but this does not show in HRS.  She admits the weight gain is due to her diet and her eating disorder. She reports currently seeking help with this but is not compliant with low sodium and exceeds recommended caloric intake in large portions. Denies any further symptoms besides weight increase.    FYI to CANDICE Zee, KINDRA     Total time 10 minutes

## 2022-08-11 NOTE — PROGRESS NOTES
[facility]: [facility] Routine Remote Evaluation    Bess is a patient diagnosed with HFpEF started with HRS Remote Monitoring on 3/16/22. She has been compliant with daily HRS Monitoring during the period of 6/18/22 through 7/18/22, and is engaged in the management of his/her heart failure.     In agreement with the ordering provider, the following parameters were used to monitor her. Any metrics outside of these parameters resulted in a call to the patient to assess for heart failure symptoms and notification of the patients provider.     Patient will be enrolled in [employer] technology. Nursing staff will monitor metric input and heart failure symptom questions daily via Clinician Connect Portal. Nursing will notify the heart failure provider if metrics fall outside the following parameters:  Notified for diastolic blood pressure greater than 100 and less than 50  Notified for systolic blood pressure greater than 160 and less than 80  Notified for heart rate greater than 100 and less than 50   Notified for pulse oximeter reading less than 90%  Notified of weight increase of 2 pounds in 1 days   Notified of weight increase of 5 pounds in 7 days   Notified of weight increase of 10 pounds from baseline weight  Patient will be prompted to answer daily symptom questions. Unfavorable questions will be addressed by nursing staff.   1. Have you experienced any increased shortness of breath with daily activity in the past 24 hours?   2. Have you had any increased or new swelling in your ankles, feet, or other body parts in the last 24 hours?   3. Have you experienced any increased tiredness or fatigue in the last 24 hours?          The following alerts/out of range interactions occurred during this monitoring period:    6/30/22 HRS alert wt  7/8/22 HRS alert wt  7/13/22 HRS alert wt    Next Visit: is requested.  Will continue with weekly monitoring with HF provider team.      TOTAL INTERACTIVE COMMUNICATION WITH PATIENT DURING THIS BILLING PERIOD: 30 minutes.    Irvin BERNAL RN  BSN    I have reviewed Irvin BERNAL RN, BSN's note and agree.    Ralú Sanchez MD., MHS      READINGS:

## 2022-08-18 ENCOUNTER — ALLIED HEALTH/NURSE VISIT (OUTPATIENT)
Dept: CARDIOLOGY | Facility: CLINIC | Age: 47
End: 2022-08-18
Payer: MEDICARE

## 2022-08-18 DIAGNOSIS — I50.30 NYHA CLASS 3 HEART FAILURE WITH PRESERVED EJECTION FRACTION (H): Primary | ICD-10-CM

## 2022-08-18 PROCEDURE — 99207 PR NO CHARGE LOS: CPT | Performed by: INTERNAL MEDICINE

## 2022-08-18 PROCEDURE — 99457 RPM TX MGMT 1ST 20 MIN: CPT | Performed by: INTERNAL MEDICINE

## 2022-08-18 PROCEDURE — 99454 REM MNTR PHYSIOL PARAM 16-30: CPT | Performed by: INTERNAL MEDICINE

## 2022-08-20 ENCOUNTER — HEALTH MAINTENANCE LETTER (OUTPATIENT)
Age: 47
End: 2022-08-20

## 2022-08-22 ENCOUNTER — TELEPHONE (OUTPATIENT)
Dept: CARDIOLOGY | Facility: CLINIC | Age: 47
End: 2022-08-22

## 2022-08-22 NOTE — PROGRESS NOTES
[facility]: [facility] Routine Remote Evaluation    Bess is a patient diagnosed with HFpEF started with HRS Remote Monitoring on 3/16/22. She has been compliant with daily HRS Monitoring during the period of 7/19/22 through 8/18/22, and is engaged in the management of his/her heart failure.      In agreement with the ordering provider, the following parameters were used to monitor her. Any metrics outside of these parameters resulted in a call to the patient to assess for heart failure symptoms and notification of the patients provider.      Patient will be enrolled in [employer] ([employer] technology. Nursing staff will monitor metric input and heart failure symptom questions daily via Clinician Connect Portal. Nursing will notify the heart failure provider if metrics fall outside the following parameters:  Notified for diastolic blood pressure greater than 100 and less than 50  Notified for systolic blood pressure greater than 160 and less than 80  Notified for heart rate greater than 100 and less than 50   Notified for pulse oximeter reading less than 90%  Notified of weight increase of 2 pounds in 1 days   Notified of weight increase of 5 pounds in 7 days   Notified of weight increase of 10 pounds from baseline weight  Patient will be prompted to answer daily symptom questions. Unfavorable questions will be addressed by nursing staff.   1. Have you experienced any increased shortness of breath with daily activity in the past 24 hours?   2. Have you had any increased or new swelling in your ankles, feet, or other body parts in the last 24 hours?   3. Have you experienced any increased tiredness or fatigue in the last 24 hours?          The following alerts/out of range interactions occurred during this monitoring period:    8/4/22 HRS wt  8/9/22 HRS wt    Next Visit: 8/24/22 with Dr. Sanchez  Will continue with weekly monitoring with HF provider team.     TOTAL INTERACTIVE  COMMUNICATION WITH PATIENT DURING THIS BILLING PERIOD: 25 minutes.    Irvin BERNAL RN  BSN    I have reviewed Irvin BERNAL RN, BSN's note and agree.    Raúl Sanchez MD., MHS      READINGS:

## 2022-08-22 NOTE — TELEPHONE ENCOUNTER
Called Pt regarding 7.5 weight increase from 8/19 to 8/22. Pt reported she was aware it would be high today due to her dietary choices over the weekend. She did agree she would try very hard today to maintain a low sodium diet. She denies any symptoms at this time.    ANNAI to Dr. Sanchez.    Thelma Cifuentes RN     Total time 5 min

## 2022-08-24 ENCOUNTER — OFFICE VISIT (OUTPATIENT)
Dept: CARDIOLOGY | Facility: CLINIC | Age: 47
End: 2022-08-24
Payer: MEDICARE

## 2022-08-24 VITALS
BODY MASS INDEX: 50.02 KG/M2 | SYSTOLIC BLOOD PRESSURE: 114 MMHG | DIASTOLIC BLOOD PRESSURE: 78 MMHG | RESPIRATION RATE: 16 BRPM | HEART RATE: 88 BPM | WEIGHT: 293 LBS | HEIGHT: 64 IN

## 2022-08-24 DIAGNOSIS — I50.30 NYHA CLASS 3 HEART FAILURE WITH PRESERVED EJECTION FRACTION (H): Primary | ICD-10-CM

## 2022-08-24 PROCEDURE — 99214 OFFICE O/P EST MOD 30 MIN: CPT | Performed by: INTERNAL MEDICINE

## 2022-08-24 NOTE — PROGRESS NOTES
HEART CARE NOTE          Assessment/Recommendations     1. HFpEF/RV dysfunction  Assessment / Plan    Previous Echo significant for RV dysfunction in the setting of PE - repeat echo performed in the setting of volume overload  - will plan to repeat once we can get patient near euvolemia; may determine the need for VQ scan to assess for chronic Pes if RV dysfunction is persistent    Patient is noncompliant with dietary restrictions and continues to progressively retain fluid. [facility] had made multiple attempts to reeducate regarding her Na and fluid intake as well as adjusts her diuretic regimen to increase diuresis. Unfortunately patient has been resistant. After much discussion today, patient has agreed to follow recommendations in the future provided that nephrology assists with the dose adjustments - will reachout to patient's nephrologist to coordinate      2. CKD  Assessment / Plan    [facility] with [facility]    Renal function stable on recent check; crt down to baseline     3. HTN  Assessment / Plan     Adequately controlled on current regimen; no changes at this time    4. NARCISA  Assessment / Plan    CPAP at bedtime     5. PE  Assessment / Plan    Follows with pulmonary      History of Present Illness/Subjective      Ms. Bess Enamorado is a 46 year old female with a PMHx significant for PMH of hypertension, CHF, nephrolithiasis, PE on xarelto, NARCISA, multiple sclerosis, chronic lymphedema who presents to [facility] for follow-up care.     Today, Bess report symptoms of volume overload; Management plan as detailed above above.      ECG: Personally reviewed. sinus tachycardia.     ECHO (personnaly Reviewed):   Interpretation Summary     1.Left ventricular size, wall motion and function are normal. The ejection  fraction is > 65%.  2.Moderately decreased right ventricular systolic function  3.No hemodynamically significant valvular abnormalities on 2D or color flow  imaging.  4.Technically difficult,  "suboptimal study due to patient size.  There is no comparison study available.          Physical Examination Review of Systems   /78 (BP Location: Right arm, Patient Position: Sitting, Cuff Size: Adult Regular)   Pulse 88   Resp 16   Ht 1.626 m (5' 4\")   Wt (!) 157.6 kg (347 lb 8 oz)   BMI 59.65 kg/m    Body mass index is 59.65 kg/m .  Wt Readings from Last 3 Encounters:   08/24/22 (!) 157.6 kg (347 lb 8 oz)   05/25/22 148.8 kg (328 lb)   05/17/22 (!) 150.1 kg (331 lb)     General Appearance:   no distress, normal body habitus   ENT/Mouth: membranes moist, no oral lesions or bleeding gums.      EYES:  no scleral icterus, normal conjunctivae   Neck: no carotid bruits or thyromegaly   Chest/Lungs:   lungs are clear to auscultation, no rales or wheezing, equal chest wall expansion    Cardiovascular:   Regular. Normal first and second heart sounds with no murmurs, rubs, or gallops; the carotid, radial and posterior tibial pulses are intact, + JVD and LE edema bilaterally    Abdomen:  no organomegaly, masses, bruits, or tenderness; bowel sounds are present   Extremities: no cyanosis or clubbing   Skin: no xanthelasma, warm.    Neurologic: alert and oriented x3, calm     Psychiatric: alert and oriented x3, calm     A complete 10 systems ROS was reviewed  And is negative except what is listed in the HPI.          Medical History  Surgical History Family History Social History   Past Medical History:   Diagnosis Date     Acute on chronic diastolic congestive heart failure (H) 02/09/2022     Arthritis 2019    Hips     ASCUS favor benign 08/2015    Neg HPV     Depressive disorder 2010     H/O colposcopy with cervical biopsy 09/14/2015    Bx & ECC - negative     Hypertension 2019     LSIL on Pap smear 07/2014    Neg high risk HPV     Multiple sclerosis (H) 08/29/2005 9/18/200pt dx with MS 2004--dx by neurolgist and is followed by Dr. Harris     Myocardial infarction (H)      Obese      Shingles 10/2016     Sleep " apnea      UNSPEC CONSTIPATION 09/18/2006 9/18/2006   Has tried otc suppository and otc lax.     Past Surgical History:   Procedure Laterality Date     CHOLECYSTECTOMY, LAPOROSCOPIC      Cholecystectomy, Laparoscopic     COLONOSCOPY  2007?    Bathroom issue..results normal     COMBINED CYSTOSCOPY, RETROGRADES, EXCHANGE STENT URETER(S) Right 2/21/2022    Procedure: CYSTOSCOPY, WITH right RETROGRADE PYELOGRAM AND right URETERAL STENT PLACEMENT;  Surgeon: Doyle Palomares MD;  [place] [facility] [place]     OPEN REDUCTION INTERNAL FIXATION TOE(S)  4/13/2012    Procedure:OPEN REDUCTION INTERNAL FIXATION TOE(S); Open reduction internal fixation right proximal fifth metatarsal fracture-   Anes-choice block; Surgeon:LEY, JEFFREY DUANE; Location:[place] OR     PICC TRIPLE LUMEN PLACEMENT  2/21/2022         no family history of premature coronary artery disease Social History     Socioeconomic History     Marital status: Single     Spouse name: Not on file     Number of children: Not on file     Years of education: Not on file     Highest education level: Not on file   Occupational History     Employer: [employer]   Tobacco Use     Smoking status: Never Smoker     Smokeless tobacco: Never Used   Vaping Use     Vaping Use: Never used   Substance and Sexual Activity     Alcohol use: Yes     Comment: social     Drug use: No     Sexual activity: Not Currently     Partners: Male     Birth control/protection: Pill   Other Topics Concern     Parent/sibling w/ CABG, MI or angioplasty before 65F 55M? No   Social History Narrative    , 3rd-5th grade     Social Determinants of Health     Financial Resource Strain: Low Risk      Difficulty of Paying Living Expenses: Not very hard   Food Insecurity: No Food Insecurity     Worried About Running Out of Food in the Last Year: Never true     Ran Out of Food in the Last Year: Never true   Transportation Needs: No Transportation Needs     Lack of Transportation  (Medical): No     Lack of Transportation (Non-Medical): No   Physical Activity: Inactive     Days of Exercise per Week: 0 days     Minutes of Exercise per Session: 0 min   Stress: Not on file   Social Connections: Unknown     Frequency of Communication with Friends and Family: Twice a week     Frequency of Social Gatherings with Friends and Family: Twice a week     Attends Ep[employer]opalian Services: Not on file     Active Member of Clubs or Organizations: Not on file     Attends Club or Organization Meetings: Not on file     Marital Status: Not on file   Intimate Partner Violence: Not At Risk     Fear of Current or Ex-Partner: No     Emotionally Abused: No     Physically Abused: No     Sexually Abused: No   Housing Stability: Not on file           Lab Results    Chemistry/lipid CBC Cardiac Enzymes/BNP/TSH/INR   Lab Results   Component Value Date    CHOL 173 05/11/2022    HDL 27 (L) 05/11/2022    TRIG 285 (H) 05/11/2022    BUN 19 07/20/2022     07/20/2022    CO2 33 (H) 07/20/2022    Lab Results   Component Value Date    WBC 8.5 05/11/2022    HGB 12.4 05/11/2022    HCT 39.1 05/11/2022    MCV 86 05/11/2022     05/11/2022    Lab Results   Component Value Date    TROPONINI 0.03 04/06/2022     (H) 04/06/2022    TSH 1.52 04/07/2022    INR 1.23 (H) 02/11/2022     Lab Results   Component Value Date    TROPONINI 0.03 04/06/2022          Weight:    Wt Readings from Last 3 Encounters:   08/24/22 (!) 157.6 kg (347 lb 8 oz)   05/25/22 148.8 kg (328 lb)   05/17/22 (!) 150.1 kg (331 lb)       Allergies  Allergies   Allergen Reactions     Nkda [No Known Drug Allergies]          Surgical History  Past Surgical History:   Procedure Laterality Date     CHOLECYSTECTOMY, LAPOROSCOPIC      Cholecystectomy, Laparoscopic     COLONOSCOPY  2007?    Bathroom issue..results normal     COMBINED CYSTOSCOPY, RETROGRADES, EXCHANGE STENT URETER(S) Right 2/21/2022    Procedure: CYSTOSCOPY, WITH right RETROGRADE PYELOGRAM AND right  URETERAL STENT PLACEMENT;  Surgeon: Doyle Palomares MD;  Location: [facility]     OPEN REDUCTION INTERNAL FIXATION TOE(S)  4/13/2012    Procedure:OPEN REDUCTION INTERNAL FIXATION TOE(S); Open reduction internal fixation right proximal fifth metatarsal fracture-   Anes-choice block; Surgeon:LEY, JEFFREY DUANE; Location:[place]     PICC TRIPLE LUMEN PLACEMENT  2/21/2022            Social History  Tobacco:   History   Smoking Status     Never Smoker   Smokeless Tobacco     Never Used    Alcohol:   Social History    Substance and Sexual Activity      Alcohol use: Yes        Comment: social   Illicit Drugs:   History   Drug Use No       Family History  Family History   Problem Relation Age of Onset     Neurologic Disorder Father         MS     Heart Disease Father         irregular heart rate     Diabetes Father      Melanoma Father      Sleep Apnea Father      Other Cancer Father      Cancer Maternal Grandfather         lung     Other Cancer Maternal Grandfather      Cancer Paternal Grandmother         stomach     Other Cancer Paternal Grandmother      Cancer Mother      Other Cancer Mother      Thyroid Disease Mother      Gynecology Maternal Aunt         PCOS     Hypertension Maternal Grandmother      Anxiety Disorder Maternal Grandmother      Thyroid Disease Maternal Grandmother      Anxiety Disorder Sister           Raúl Sanchez MD on 8/24/2022      cc: Mikala Luna

## 2022-08-24 NOTE — LETTER
8/24/2022    Mikala Luna MD  [address]  [facility] [address]    RE: Bess Enamorado       Dear Colleague,     I had the pleasure of seeing Bess Enamorado in the [facility].    HEART CARE NOTE          Assessment/Recommendations     1. HFpEF/RV dysfunction  Assessment / Plan    Previous Echo significant for RV dysfunction in the setting of PE - repeat echo performed in the setting of volume overload  - will plan to repeat once we can get patient near euvolemia; may determine the need for VQ scan to assess for chronic Pes if RV dysfunction is persistent    Patient is noncompliant with dietary restrictions and continues to progressively retain fluid. [facility] had made multiple attempts to reeducate regarding her Na and fluid intake as well as adjusts her diuretic regimen to increase diuresis. Unfortunately patient has been resistant. After much discussion today, patient has agreed to follow recommendations in the further provided that nephrology assists with the dose adjustments - will reach to patient's nephrologist to coordinate      2. CKD  Assessment / Plan    [facility]    Renal function stable on recent check; crt down to baseline     3. HTN  Assessment / Plan     Adequately controlled on current regimen; no changes at this time    4. NARCISA  Assessment / Plan    CPAP at bedtime     5. PE  Assessment / Plan    Follows with pulmonary      History of Present Illness/Subjective      Ms. Bess Enamorado is a 46 year old female with a PMHx significant for PMH of hypertension, CHF, nephrolithiasis, PE on xarelto, NARCISA, multiple sclerosis, chronic lymphedema who presents to [facility] for follow-up care.     Today, Bess report symptoms of volume overload; Management plan as detailed above above.      ECG: Personally reviewed. sinus tachycardia.     ECHO (personnaly Reviewed):   Interpretation Summary     1.Left ventricular size, wall motion and function are normal. The  "ejection  fraction is > 65%.  2.Moderately decreased right ventricular systolic function  3.No hemodynamically significant valvular abnormalities on 2D or color flow  imaging.  4.Technically difficult, suboptimal study due to patient size.  There is no comparison study available.          Physical Examination Review of Systems   /78 (BP Location: Right arm, Patient Position: Sitting, Cuff Size: Adult Regular)   Pulse 88   Resp 16   Ht 1.626 m (5' 4\")   Wt (!) 157.6 kg (347 lb 8 oz)   BMI 59.65 kg/m    Body mass index is 59.65 kg/m .  Wt Readings from Last 3 Encounters:   08/24/22 (!) 157.6 kg (347 lb 8 oz)   05/25/22 148.8 kg (328 lb)   05/17/22 (!) 150.1 kg (331 lb)     General Appearance:   no distress, normal body habitus   ENT/Mouth: membranes moist, no oral lesions or bleeding gums.      EYES:  no scleral icterus, normal conjunctivae   Neck: no carotid bruits or thyromegaly   Chest/Lungs:   lungs are clear to auscultation, no rales or wheezing, equal chest wall expansion    Cardiovascular:   Regular. Normal first and second heart sounds with no murmurs, rubs, or gallops; the carotid, radial and posterior tibial pulses are intact, + JVD and LE edema bilaterally    Abdomen:  no organomegaly, masses, bruits, or tenderness; bowel sounds are present   Extremities: no cyanosis or clubbing   Skin: no xanthelasma, warm.    Neurologic: alert and oriented x3, calm     Psychiatric: alert and oriented x3, calm     A complete 10 systems ROS was reviewed  And is negative except what is listed in the HPI.          Medical History  Surgical History Family History Social History   Past Medical History:   Diagnosis Date     Acute on chronic diastolic congestive heart failure (H) 02/09/2022     Arthritis 2019    Hips     ASCUS favor benign 08/2015    Neg HPV     Depressive disorder 2010     H/O colposcopy with cervical biopsy 09/14/2015    Bx & ECC - negative     Hypertension 2019     LSIL on Pap smear 07/2014    Neg " high risk HPV     Multiple sclerosis (H) 08/29/2005 9/18/200pt dx with MS 2004--dx by liza and is followed by Dr. Harris     Myocardial infarction (H)      Obese      Shingles 10/2016     Sleep apnea      UNSPEC CONSTIPATION 09/18/2006 9/18/2006   Has tried otc suppository and otc lax.     Past Surgical History:   Procedure Laterality Date     CHOLECYSTECTOMY, LAPOROSCOPIC      Cholecystectomy, Laparoscopic     COLONOSCOPY  2007?    Bathroom issue..results normal     COMBINED CYSTOSCOPY, RETROGRADES, EXCHANGE STENT URETER(S) Right 2/21/2022    Procedure: CYSTOSCOPY, WITH right RETROGRADE PYELOGRAM AND right URETERAL STENT PLACEMENT;  Surgeon: Doyle Palomares MD;  Location: [facility]     OPEN REDUCTION INTERNAL FIXATION TOE(S)  4/13/2012    Procedure:OPEN REDUCTION INTERNAL FIXATION TOE(S); Open reduction internal fixation right proximal fifth metatarsal fracture-   Anes-choice block; Surgeon:LEY, JEFFREY DUANE; Location:[place]     PICC TRIPLE LUMEN PLACEMENT  2/21/2022         no family history of premature coronary artery disease Social History     Socioeconomic History     Marital status: Single     Spouse name: Not on file     Number of children: Not on file     Years of education: Not on file     Highest education level: Not on file   Occupational History     Employer: [employer]   Tobacco Use     Smoking status: Never Smoker     Smokeless tobacco: Never Used   Vaping Use     Vaping Use: Never used   Substance and Sexual Activity     Alcohol use: Yes     Comment: social     Drug use: No     Sexual activity: Not Currently     Partners: Male     Birth control/protection: Pill   Other Topics Concern     Parent/sibling w/ CABG, MI or angioplasty before 65F 55M? No   Social History Narrative    , 3rd-5th grade     Social Determinants of Health     Financial Resource Strain: Low Risk      Difficulty of Paying Living Expenses: Not very hard   Food Insecurity: No Food  Insecurity     Worried About Running Out of Food in the Last Year: Never true     Ran Out of Food in the Last Year: Never true   Transportation Needs: No Transportation Needs     Lack of Transportation (Medical): No     Lack of Transportation (Non-Medical): No   Physical Activity: Inactive     Days of Exercise per Week: 0 days     Minutes of Exercise per Session: 0 min   Stress: Not on file   Social Connections: Unknown     Frequency of Communication with Friends and Family: Twice a week     Frequency of Social Gatherings with Friends and Family: Twice a week     Attends Bahai Services: Not on file     Active Member of Clubs or Organizations: Not on file     Attends Club or Organization Meetings: Not on file     Marital Status: Not on file   Intimate Partner Violence: Not At Risk     Fear of Current or Ex-Partner: No     Emotionally Abused: No     Physically Abused: No     Sexually Abused: No   Housing Stability: Not on file           Lab Results    Chemistry/lipid CBC Cardiac Enzymes/BNP/TSH/INR   Lab Results   Component Value Date    CHOL 173 05/11/2022    HDL 27 (L) 05/11/2022    TRIG 285 (H) 05/11/2022    BUN 19 07/20/2022     07/20/2022    CO2 33 (H) 07/20/2022    Lab Results   Component Value Date    WBC 8.5 05/11/2022    HGB 12.4 05/11/2022    HCT 39.1 05/11/2022    MCV 86 05/11/2022     05/11/2022    Lab Results   Component Value Date    TROPONINI 0.03 04/06/2022     (H) 04/06/2022    TSH 1.52 04/07/2022    INR 1.23 (H) 02/11/2022     Lab Results   Component Value Date    TROPONINI 0.03 04/06/2022          Weight:    Wt Readings from Last 3 Encounters:   08/24/22 (!) 157.6 kg (347 lb 8 oz)   05/25/22 148.8 kg (328 lb)   05/17/22 (!) 150.1 kg (331 lb)       Allergies  Allergies   Allergen Reactions     Nkda [No Known Drug Allergies]          Surgical History  Past Surgical History:   Procedure Laterality Date     CHOLECYSTECTOMY, LAPOROSCOPIC      Cholecystectomy, Laparoscopic      COLONOSCOPY  2007?    Bathroom issue..results normal     COMBINED CYSTOSCOPY, RETROGRADES, EXCHANGE STENT URETER(S) Right 2/21/2022    Procedure: CYSTOSCOPY, WITH right RETROGRADE PYELOGRAM AND right URETERAL STENT PLACEMENT;  Surgeon: Doyle Palomares MD;  Location: [facility] [place] [facility]     OPEN REDUCTION INTERNAL FIXATION TOE(S)  4/13/2012    Procedure:OPEN REDUCTION INTERNAL FIXATION TOE(S); Open reduction internal fixation right proximal fifth metatarsal fracture-   Anes-choice block; Surgeon:LEY, JEFFREY DUANE; Location:[place] OR     PICC TRIPLE LUMEN PLACEMENT  2/21/2022            Social History  Tobacco:   History   Smoking Status     Never Smoker   Smokeless Tobacco     Never Used    Alcohol:   Social History    Substance and Sexual Activity      Alcohol use: Yes        Comment: social   Illicit Drugs:   History   Drug Use No       Family History  Family History   Problem Relation Age of Onset     Neurologic Disorder Father         MS     Heart Disease Father         irregular heart rate     Diabetes Father      Melanoma Father      Sleep Apnea Father      Other Cancer Father      Cancer Maternal Grandfather         lung     Other Cancer Maternal Grandfather      Cancer Paternal Grandmother         stomach     Other Cancer Paternal Grandmother      Cancer Mother      Other Cancer Mother      Thyroid Disease Mother      Gynecology Maternal Aunt         PCOS     Hypertension Maternal Grandmother      Anxiety Disorder Maternal Grandmother      Thyroid Disease Maternal Grandmother      Anxiety Disorder Sister           Raúl Sanchez MD on 8/24/2022      cc: Mikala Luna        Thank you for allowing me to participate in the care of your patient.      Sincerely,     Raúl Sanchez MD     [facility]  cc:   No referring provider defined for this encounter.

## 2022-08-25 ENCOUNTER — TELEPHONE (OUTPATIENT)
Dept: CARDIOLOGY | Facility: CLINIC | Age: 47
End: 2022-08-25

## 2022-08-25 DIAGNOSIS — I50.32 CHRONIC DIASTOLIC CONGESTIVE HEART FAILURE (H): Primary | ICD-10-CM

## 2022-08-25 RX ORDER — METOLAZONE 2.5 MG/1
TABLET ORAL
Qty: 5 TABLET | Refills: 0 | Status: SHIPPED | OUTPATIENT
Start: 2022-08-25 | End: 2023-09-27

## 2022-08-25 RX ORDER — BUMETANIDE 1 MG/1
3 TABLET ORAL 2 TIMES DAILY
Qty: 540 TABLET | Refills: 3 | Status: SHIPPED | OUTPATIENT
Start: 2022-08-25 | End: 2023-09-18

## 2022-08-25 NOTE — TELEPHONE ENCOUNTER
Spoke with Pt and gave recommendations. She was in agreement to take Metolazone 2.5mg x1 but does not currently have any on hand. Will  from pharmacy. Also agreed to change bumex to 3mg BID. Sending updated script to pharmacy. Labs to be performed by [facility] nurse who visits with Pt. VM left on [facility] line requesting call back to [facility] to receive orders for BMP labs day after metolazone and another BMP lab on 8/30. Awaiting call back.     Thelma Cifuentes RN

## 2022-08-25 NOTE — TELEPHONE ENCOUNTER
Verbal orders successfully given to [facility] who confirmed they will be going out to Pt's house tomorrow and 8/30 to draw BMP lab.      Thelma Cifuentes RN

## 2022-08-25 NOTE — TELEPHONE ENCOUNTER
----- Message from Raúl Sanchez MD sent at 8/25/2022  5:26 AM CDT -----  Regarding: Diuretic adjustments  Please contact Bess and instruct her to take metolazone 2.5 mg x1 dose with repeat BMP the following day. Please also instruct her to increase her standing bumetanide dosing to 3 mg BID. Please instruct her to get an additional BMP next week Tuesday (8/30/22). Please let her know that this was discussed with her nephrologist.    Thanks;  ~ Daniela

## 2022-08-26 ENCOUNTER — LAB REQUISITION (OUTPATIENT)
Dept: LAB | Facility: CLINIC | Age: 47
End: 2022-08-26
Payer: MEDICARE

## 2022-08-26 ENCOUNTER — TELEPHONE (OUTPATIENT)
Dept: FAMILY MEDICINE | Facility: CLINIC | Age: 47
End: 2022-08-26

## 2022-08-26 ENCOUNTER — TELEPHONE (OUTPATIENT)
Dept: CARDIOLOGY | Facility: CLINIC | Age: 47
End: 2022-08-26

## 2022-08-26 DIAGNOSIS — I11.0 HYPERTENSIVE HEART DISEASE WITH HEART FAILURE (H): ICD-10-CM

## 2022-08-26 DIAGNOSIS — I50.32 CHRONIC HEART FAILURE WITH PRESERVED EJECTION FRACTION (H): Primary | ICD-10-CM

## 2022-08-26 DIAGNOSIS — I50.33 ACUTE ON CHRONIC DIASTOLIC (CONGESTIVE) HEART FAILURE (H): ICD-10-CM

## 2022-08-26 LAB
ANION GAP SERPL CALCULATED.3IONS-SCNC: 10 MMOL/L (ref 3–14)
BUN SERPL-MCNC: 20 MG/DL (ref 7–30)
CALCIUM SERPL-MCNC: 9.9 MG/DL (ref 8.5–10.1)
CHLORIDE BLD-SCNC: 100 MMOL/L (ref 94–109)
CO2 SERPL-SCNC: 28 MMOL/L (ref 20–32)
CREAT SERPL-MCNC: 0.8 MG/DL (ref 0.52–1.04)
GFR SERPL CREATININE-BSD FRML MDRD: >90 ML/MIN/1.73M2
GLUCOSE BLD-MCNC: 98 MG/DL (ref 70–99)
POTASSIUM BLD-SCNC: 3.1 MMOL/L (ref 3.4–5.3)
SODIUM SERPL-SCNC: 138 MMOL/L (ref 133–144)

## 2022-08-26 PROCEDURE — 80048 BASIC METABOLIC PNL TOTAL CA: CPT | Mod: ORL | Performed by: INTERNAL MEDICINE

## 2022-08-26 PROCEDURE — 36415 COLL VENOUS BLD VENIPUNCTURE: CPT | Mod: ORL | Performed by: INTERNAL MEDICINE

## 2022-08-26 RX ORDER — POTASSIUM CHLORIDE 1500 MG/1
40 TABLET, EXTENDED RELEASE ORAL 2 TIMES DAILY
Qty: 2 TABLET | Refills: 0 | Status: SHIPPED | OUTPATIENT
Start: 2022-08-26 | End: 2022-09-01

## 2022-08-26 NOTE — TELEPHONE ENCOUNTER
Reason for Call:  Home Health Care    [facility]  OT: CONTINUE PLAN OF CARE:  1 X A WEEK FOR 5    Pt Provider: VERENA    Phone Number Homecare Nurse can be reached at: [phone]    Can we leave a detailed message on this number? YESCall taken on 8/26/2022 at 9:54 AM by Cyndie Hercules

## 2022-08-26 NOTE — TELEPHONE ENCOUNTER
Message left for Aimee at [facility] care giving verbal authorization of home care orders per [facility] RN protocol. Christie Farah RN

## 2022-08-30 ENCOUNTER — LAB REQUISITION (OUTPATIENT)
Dept: LAB | Facility: CLINIC | Age: 47
End: 2022-08-30
Payer: MEDICARE

## 2022-08-30 DIAGNOSIS — I11.0 HYPERTENSIVE HEART DISEASE WITH HEART FAILURE (H): ICD-10-CM

## 2022-08-30 LAB
ANION GAP SERPL CALCULATED.3IONS-SCNC: 13 MMOL/L (ref 7–15)
BUN SERPL-MCNC: 19.5 MG/DL (ref 6–20)
CALCIUM SERPL-MCNC: 9.8 MG/DL (ref 8.6–10)
CHLORIDE SERPL-SCNC: 93 MMOL/L (ref 98–107)
CREAT SERPL-MCNC: 0.78 MG/DL (ref 0.51–0.95)
DEPRECATED HCO3 PLAS-SCNC: 28 MMOL/L (ref 22–29)
GFR SERPL CREATININE-BSD FRML MDRD: >90 ML/MIN/1.73M2
GLUCOSE SERPL-MCNC: 106 MG/DL (ref 70–99)
POTASSIUM SERPL-SCNC: 2.8 MMOL/L (ref 3.4–5.3)
SODIUM SERPL-SCNC: 134 MMOL/L (ref 136–145)

## 2022-08-30 PROCEDURE — 80048 BASIC METABOLIC PNL TOTAL CA: CPT | Mod: ORL | Performed by: INTERNAL MEDICINE

## 2022-08-30 PROCEDURE — 36415 COLL VENOUS BLD VENIPUNCTURE: CPT | Mod: ORL | Performed by: INTERNAL MEDICINE

## 2022-08-31 ENCOUNTER — TELEPHONE (OUTPATIENT)
Dept: CARDIOLOGY | Facility: CLINIC | Age: 47
End: 2022-08-31

## 2022-08-31 ENCOUNTER — MEDICAL CORRESPONDENCE (OUTPATIENT)
Dept: FAMILY MEDICINE | Facility: CLINIC | Age: 47
End: 2022-08-31

## 2022-08-31 DIAGNOSIS — I50.32 CHRONIC DIASTOLIC CONGESTIVE HEART FAILURE (H): Primary | ICD-10-CM

## 2022-08-31 DIAGNOSIS — I50.32 CHRONIC HEART FAILURE WITH PRESERVED EJECTION FRACTION (H): ICD-10-CM

## 2022-08-31 NOTE — TELEPHONE ENCOUNTER
Please contact Bess and update her regarding the test results? Please have her take an additional 40 mEq of potassium today. Please have her repeat a BMP on Thursday to follow-up her potassium.    From: Raúl Sanchez MD    Called Pt to give instructions. She reported she has not yet picked up her potassium but agreed to do so today. [facility] Thursday[facility]     Thelma Cifuentes RN

## 2022-08-31 NOTE — TELEPHONE ENCOUNTER
Bess calls and states unable to get potassium medication due to prescription error. Pharmacy called and orders clarified.    Irvin BERNAL RN  BSN

## 2022-09-01 ENCOUNTER — TELEPHONE (OUTPATIENT)
Dept: CARDIOLOGY | Facility: CLINIC | Age: 47
End: 2022-09-01

## 2022-09-01 ENCOUNTER — LAB REQUISITION (OUTPATIENT)
Dept: LAB | Facility: CLINIC | Age: 47
End: 2022-09-01
Payer: MEDICARE

## 2022-09-01 DIAGNOSIS — I50.32 CHRONIC HEART FAILURE WITH PRESERVED EJECTION FRACTION (H): ICD-10-CM

## 2022-09-01 DIAGNOSIS — I50.33 ACUTE ON CHRONIC DIASTOLIC (CONGESTIVE) HEART FAILURE (H): ICD-10-CM

## 2022-09-01 DIAGNOSIS — I11.0 HYPERTENSIVE HEART DISEASE WITH HEART FAILURE (H): ICD-10-CM

## 2022-09-01 LAB
ANION GAP SERPL CALCULATED.3IONS-SCNC: 11 MMOL/L (ref 7–15)
BUN SERPL-MCNC: 20 MG/DL (ref 6–20)
CALCIUM SERPL-MCNC: 9.8 MG/DL (ref 8.6–10)
CHLORIDE SERPL-SCNC: 94 MMOL/L (ref 98–107)
CREAT SERPL-MCNC: 0.77 MG/DL (ref 0.51–0.95)
DEPRECATED HCO3 PLAS-SCNC: 30 MMOL/L (ref 22–29)
GFR SERPL CREATININE-BSD FRML MDRD: >90 ML/MIN/1.73M2
GLUCOSE SERPL-MCNC: 89 MG/DL (ref 70–99)
POTASSIUM SERPL-SCNC: 3.2 MMOL/L (ref 3.4–5.3)
SODIUM SERPL-SCNC: 135 MMOL/L (ref 136–145)

## 2022-09-01 PROCEDURE — 80048 BASIC METABOLIC PNL TOTAL CA: CPT | Mod: ORL | Performed by: INTERNAL MEDICINE

## 2022-09-01 PROCEDURE — 36415 COLL VENOUS BLD VENIPUNCTURE: CPT | Mod: ORL | Performed by: INTERNAL MEDICINE

## 2022-09-01 RX ORDER — POTASSIUM CHLORIDE 1500 MG/1
40 TABLET, EXTENDED RELEASE ORAL 2 TIMES DAILY
Qty: 2 TABLET | Refills: 0 | Status: SHIPPED | OUTPATIENT
Start: 2022-09-01 | End: 2022-10-05

## 2022-09-01 RX ORDER — POTASSIUM CHLORIDE 1500 MG/1
40 TABLET, EXTENDED RELEASE ORAL 2 TIMES DAILY
Qty: 2 TABLET | Refills: 0 | Status: CANCELLED | OUTPATIENT
Start: 2022-09-01

## 2022-09-01 NOTE — TELEPHONE ENCOUNTER
"Bess is calling today to discuss her current HF status, she reports that she did note her wt to be elevated today, 6.5# in one day was observed from her home monitoring device. \"I do not feel it is accurate, as I have been following the low sodium diet since Sunday\". She stated.    Bess denies increased LE edema, no worsening of her shortness of breath and she is sleeping well. Denies dizziness lightheadedness or cough.  \"I feel pretty good for me and how I usually do,\" Bess stated.  Last Creat was normal and has repeat labs today.    Alexandra BERNAL RN BSN, CHFN, PCCN-K    HRS time 10 minutes    "

## 2022-09-01 NOTE — TELEPHONE ENCOUNTER
Verbal orders received from Dr. Sanchez to start Potassium 40mEq BID.  Order sent to pharmacy.     Repeat a BMP next Tuesday (9/6/22).  Order placed with [facility] skilled nursing.     Thelma Cifuentes RN

## 2022-09-02 ENCOUNTER — MEDICAL CORRESPONDENCE (OUTPATIENT)
Dept: FAMILY MEDICINE | Facility: CLINIC | Age: 47
End: 2022-09-02

## 2022-09-06 ENCOUNTER — LAB REQUISITION (OUTPATIENT)
Dept: LAB | Facility: HOSPITAL | Age: 47
End: 2022-09-06
Payer: MEDICARE

## 2022-09-06 DIAGNOSIS — I50.33 ACUTE ON CHRONIC DIASTOLIC (CONGESTIVE) HEART FAILURE (H): ICD-10-CM

## 2022-09-06 DIAGNOSIS — I11.0 HYPERTENSIVE HEART DISEASE WITH HEART FAILURE (H): ICD-10-CM

## 2022-09-06 LAB
ANION GAP SERPL CALCULATED.3IONS-SCNC: 11 MMOL/L (ref 5–18)
BUN SERPL-MCNC: 22 MG/DL (ref 8–22)
CALCIUM SERPL-MCNC: 10.1 MG/DL (ref 8.5–10.5)
CHLORIDE BLD-SCNC: 98 MMOL/L (ref 98–107)
CO2 SERPL-SCNC: 25 MMOL/L (ref 22–31)
CREAT SERPL-MCNC: 0.82 MG/DL (ref 0.6–1.1)
GFR SERPL CREATININE-BSD FRML MDRD: 88 ML/MIN/1.73M2
GLUCOSE BLD-MCNC: 94 MG/DL (ref 70–125)
POTASSIUM BLD-SCNC: 3.6 MMOL/L (ref 3.5–5)
SODIUM SERPL-SCNC: 134 MMOL/L (ref 136–145)

## 2022-09-06 PROCEDURE — 80048 BASIC METABOLIC PNL TOTAL CA: CPT | Mod: ORL | Performed by: FAMILY MEDICINE

## 2022-09-06 PROCEDURE — 36415 COLL VENOUS BLD VENIPUNCTURE: CPT | Mod: ORL | Performed by: FAMILY MEDICINE

## 2022-09-19 ENCOUNTER — TELEPHONE (OUTPATIENT)
Dept: CARDIOLOGY | Facility: CLINIC | Age: 47
End: 2022-09-19

## 2022-09-19 ENCOUNTER — ALLIED HEALTH/NURSE VISIT (OUTPATIENT)
Dept: CARDIOLOGY | Facility: CLINIC | Age: 47
End: 2022-09-19
Payer: MEDICARE

## 2022-09-19 DIAGNOSIS — I50.32 CHRONIC HEART FAILURE WITH PRESERVED EJECTION FRACTION (H): Primary | ICD-10-CM

## 2022-09-19 PROCEDURE — 99454 REM MNTR PHYSIOL PARAM 16-30: CPT | Performed by: INTERNAL MEDICINE

## 2022-09-19 PROCEDURE — 99207 PR NO CHARGE LOS: CPT | Performed by: INTERNAL MEDICINE

## 2022-09-19 NOTE — TELEPHONE ENCOUNTER
"Pt called to report that she had just taken weight with HRS and noted her weight was up. She did not take weights over the weekend and reports her Friday weight was not accurate and rechecked to be 339 instead of the 334. She is 343 today and denies any symptoms. She did say she did not take her Bumex at all yesterday because she was out at a birthday party. She indulged in \"Party food\" as well. She did agree to be compliant with medications and understands she needs to be more compliant with food options.    FYI to Key Flanagan, CANDICE Cifuentes RN   "

## 2022-09-19 NOTE — TELEPHONE ENCOUNTER
No new recs at this time.  Patient needs to take medications as prescribed even if going to outings.  Thank you

## 2022-09-23 ENCOUNTER — TELEPHONE (OUTPATIENT)
Dept: FAMILY MEDICINE | Facility: CLINIC | Age: 47
End: 2022-09-23

## 2022-09-23 NOTE — TELEPHONE ENCOUNTER
Reason for Call:  Home Health Care    Aimee with [facility] called regarding (reason for call): Verbal    Orders are needed for this patient.     OT: extend plan of care to continue lymphedema treatment 1x4 including re-certification.     Phone Number Homecare Nurse can be reached at: [phone]    Can we leave a detailed message on this number? YES    Phone number patient can be reached at: Home number on file [phone] (home)    Best Time: any    Call taken on 9/23/2022 at 10:33 AM by Araseli Mo

## 2022-09-26 NOTE — PROGRESS NOTES
[facility]: [facility] Routine Remote Evaluation     HRS Enrollment Date: 3/16/22    Billing Dates: 8/19/22 through 9/19/22.    HRS Alerts During Monitoring Period:     8/22/22 weight alert   9/1/22 weight alert    Future Appointments   Date Time Provider Department Center   10/20/2022  4:00 AM [facility]   10/25/2022  1:30 PM [facility]     Will continue with weekly monitoring with HF provider team.     TOTAL INTERACTIVE COMMUNICATION WITH PATIENT DURING THIS BILLING PERIOD: 15 minutes.      I have reviewed Thelma Cifuentes RN's note and agree.    Raúl Sanchez MD., MHS    READINGS:

## 2022-10-03 ENCOUNTER — TELEPHONE (OUTPATIENT)
Dept: CARDIOLOGY | Facility: CLINIC | Age: 47
End: 2022-10-03

## 2022-10-03 DIAGNOSIS — I50.32 CHRONIC HEART FAILURE WITH PRESERVED EJECTION FRACTION (H): Primary | ICD-10-CM

## 2022-10-03 NOTE — TELEPHONE ENCOUNTER
Spoke with pt regarding HRS weight alert from 336.5 to 348lbs in a week. Orders from Dr. Sanchez to take 1 Metolazone 2.5mg today with labs to follow tomorrow. Pt verbalized understanding and agreed to plan.    Thelma Cifuentes RN    Total time 10 min

## 2022-10-04 ENCOUNTER — LAB (OUTPATIENT)
Dept: LAB | Facility: CLINIC | Age: 47
End: 2022-10-04
Payer: MEDICARE

## 2022-10-04 DIAGNOSIS — I50.32 CHRONIC HEART FAILURE WITH PRESERVED EJECTION FRACTION (H): ICD-10-CM

## 2022-10-04 DIAGNOSIS — I50.32 CHRONIC DIASTOLIC CONGESTIVE HEART FAILURE (H): ICD-10-CM

## 2022-10-04 LAB
ANION GAP SERPL CALCULATED.3IONS-SCNC: 11 MMOL/L (ref 7–15)
BUN SERPL-MCNC: 18.2 MG/DL (ref 6–20)
CALCIUM SERPL-MCNC: 9.8 MG/DL (ref 8.6–10)
CHLORIDE SERPL-SCNC: 92 MMOL/L (ref 98–107)
CREAT SERPL-MCNC: 0.83 MG/DL (ref 0.51–0.95)
DEPRECATED HCO3 PLAS-SCNC: 32 MMOL/L (ref 22–29)
GFR SERPL CREATININE-BSD FRML MDRD: 87 ML/MIN/1.73M2
GLUCOSE SERPL-MCNC: 108 MG/DL (ref 70–99)
POTASSIUM SERPL-SCNC: 2.8 MMOL/L (ref 3.4–5.3)
SODIUM SERPL-SCNC: 135 MMOL/L (ref 136–145)

## 2022-10-04 PROCEDURE — 80048 BASIC METABOLIC PNL TOTAL CA: CPT

## 2022-10-04 PROCEDURE — 36415 COLL VENOUS BLD VENIPUNCTURE: CPT

## 2022-10-05 ENCOUNTER — TELEPHONE (OUTPATIENT)
Dept: CARDIOLOGY | Facility: CLINIC | Age: 47
End: 2022-10-05

## 2022-10-05 DIAGNOSIS — I50.32 CHRONIC HEART FAILURE WITH PRESERVED EJECTION FRACTION (H): ICD-10-CM

## 2022-10-05 RX ORDER — POTASSIUM CHLORIDE 1500 MG/1
40 TABLET, EXTENDED RELEASE ORAL DAILY
Qty: 180 TABLET | Refills: 3 | Status: SHIPPED | OUTPATIENT
Start: 2022-10-05 | End: 2023-12-21

## 2022-10-05 NOTE — TELEPHONE ENCOUNTER
Verbal orders received to start Potassium 40 MEQ daily. Pt verbalized understanding and agreed to plan. Script sent to pharmacy.    Thelma Cifuentes RN

## 2022-10-07 ENCOUNTER — TELEPHONE (OUTPATIENT)
Dept: CARDIOLOGY | Facility: CLINIC | Age: 47
End: 2022-10-07

## 2022-10-07 NOTE — TELEPHONE ENCOUNTER
Spoke with Pt regarding HRS weight alert.   10/7- 337.5  10/6- 334.5  10/5- 336.5  10/4- 341  10/3- 348 Took Metolazone this day.  Discussed importance of maintaining a low sodium diet under 2,000mg/day and fluid between 50-60oz a day. Pt reported she was being compliant with both of these restrictions and also feels like she has a lot of energy and able to do more physical activity with less difficulty.  She denies any symptoms and reports compliance with medications.     Thelma Cifuentes, RN

## 2022-10-20 ENCOUNTER — ALLIED HEALTH/NURSE VISIT (OUTPATIENT)
Dept: CARDIOLOGY | Facility: CLINIC | Age: 47
End: 2022-10-20
Payer: MEDICARE

## 2022-10-20 DIAGNOSIS — I50.32 CHRONIC DIASTOLIC CONGESTIVE HEART FAILURE (H): Primary | ICD-10-CM

## 2022-10-20 DIAGNOSIS — R00.0 SINUS TACHYCARDIA: ICD-10-CM

## 2022-10-20 DIAGNOSIS — I10 BENIGN ESSENTIAL HYPERTENSION: Chronic | ICD-10-CM

## 2022-10-20 PROCEDURE — 99207 PR NO CHARGE LOS: CPT | Performed by: INTERNAL MEDICINE

## 2022-10-20 PROCEDURE — 99454 REM MNTR PHYSIOL PARAM 16-30: CPT | Performed by: INTERNAL MEDICINE

## 2022-10-21 RX ORDER — METOPROLOL SUCCINATE 25 MG/1
TABLET, EXTENDED RELEASE ORAL
Qty: 90 TABLET | Refills: 1 | Status: SHIPPED | OUTPATIENT
Start: 2022-10-21 | End: 2023-09-27

## 2022-10-31 ENCOUNTER — TELEPHONE (OUTPATIENT)
Dept: CARDIOLOGY | Facility: CLINIC | Age: 47
End: 2022-10-31

## 2022-10-31 NOTE — TELEPHONE ENCOUNTER
Weight alert of gaining 6lbs in a week from 339 to 345. Pt denies any symptoms but reported she had too many treats over the weekend very high in sodium. She remains compliant with medication Bumex 3 mg BID. She would like to get her weight down on her own today with diet and would like to discuss possible medication changes on Friday at her appt if her weight is not down.     FYI to CANDICE Casey, RN     Total time 5 min

## 2022-10-31 NOTE — TELEPHONE ENCOUNTER
Noted, thank you for update.  If weight continues to increase on HRS tomorrow may need to adjust Bumex.  Thank you

## 2022-11-02 NOTE — TELEPHONE ENCOUNTER
Spoke with Pt who is in agreement to increase her bumex until she is seen this Friday.     Thelma Cifuentes RN

## 2022-11-04 ENCOUNTER — OFFICE VISIT (OUTPATIENT)
Dept: CARDIOLOGY | Facility: CLINIC | Age: 47
End: 2022-11-04
Attending: NURSE PRACTITIONER
Payer: MEDICARE

## 2022-11-04 VITALS
RESPIRATION RATE: 16 BRPM | BODY MASS INDEX: 57.52 KG/M2 | HEIGHT: 60 IN | WEIGHT: 293 LBS | HEART RATE: 87 BPM | DIASTOLIC BLOOD PRESSURE: 60 MMHG | SYSTOLIC BLOOD PRESSURE: 100 MMHG

## 2022-11-04 DIAGNOSIS — I50.32 CHRONIC HEART FAILURE WITH PRESERVED EJECTION FRACTION (H): Primary | ICD-10-CM

## 2022-11-04 DIAGNOSIS — I10 BENIGN ESSENTIAL HYPERTENSION: Chronic | ICD-10-CM

## 2022-11-04 DIAGNOSIS — G47.33 OSA ON CPAP: ICD-10-CM

## 2022-11-04 LAB
ANION GAP SERPL CALCULATED.3IONS-SCNC: 16 MMOL/L (ref 7–15)
BUN SERPL-MCNC: 17.9 MG/DL (ref 6–20)
CALCIUM SERPL-MCNC: 9.9 MG/DL (ref 8.6–10)
CHLORIDE SERPL-SCNC: 96 MMOL/L (ref 98–107)
CREAT SERPL-MCNC: 0.78 MG/DL (ref 0.51–0.95)
DEPRECATED HCO3 PLAS-SCNC: 26 MMOL/L (ref 22–29)
GFR SERPL CREATININE-BSD FRML MDRD: >90 ML/MIN/1.73M2
GLUCOSE SERPL-MCNC: 90 MG/DL (ref 70–99)
POTASSIUM SERPL-SCNC: 3.3 MMOL/L (ref 3.4–5.3)
SODIUM SERPL-SCNC: 138 MMOL/L (ref 136–145)

## 2022-11-04 PROCEDURE — 80048 BASIC METABOLIC PNL TOTAL CA: CPT | Performed by: NURSE PRACTITIONER

## 2022-11-04 PROCEDURE — 99214 OFFICE O/P EST MOD 30 MIN: CPT | Performed by: NURSE PRACTITIONER

## 2022-11-04 PROCEDURE — 36415 COLL VENOUS BLD VENIPUNCTURE: CPT | Performed by: NURSE PRACTITIONER

## 2022-11-04 NOTE — PATIENT INSTRUCTIONS
Bess Enamorado,    It was a pleasure to see you today at [facility] HEART [facility].     My recommendations after this visit include:  - Please follow up with Dr Sanchez in January   - Please follow up with Key Flanagan in 5 months  - I have checked labs and will contact you with results on mychart  - Continue current medications    Key Flanagan, CNP

## 2022-11-04 NOTE — PROGRESS NOTES
Assessment/Recommendations   Assessment:    1.  Heart failure with preserved ejection fraction, NYHA class III: Compensated.  She has fatigue, dyspnea on exertion and lymphedema.  She denies orthopnea or PND.  I increased her Bumex to 4 mg twice a day for a few days due to weight gain.  She has lost 5 pounds in the last 2 days.  She is trying to follow a low-sodium diet.  She is enrolled in [facility].  2.  Hypertension: Controlled.  Blood pressure 100/60  3. NARCISA: She wears her CPAP  4.  Hypokalemia: She has had low potassium in the past.  She is prescribed 40 mEq daily but states she typically only takes 20 mEq daily.  BMP pending today.    Plan:  1.  BMP pending   2.  Continue current medications  3.  Continue monitoring weights through HRS  4.  Continue [facility] meals and following a low-salt diet    Bess Enamorado will follow up with Dr Sanchez in January and in the heart failure clinic in 5 months.     History of Present Illness/Subjective    Ms. Bess Enamorado is a 46 year old female seen at [facility] heart failure clinic today for continued follow-up.  Her dad accompanies her today.  She follows up for heart failure with preserved ejection fraction.  She had an echocardiogram in May 2022 which showed ejection fraction of 60%.  She has a past medical history significant for PE, non-STEMI, hypertension, morbid obesity, COVID-19, lymphedema, NARCISA on CPAP, MS.    Today, she continues to have fatigue, dyspnea on exertion and chronic lymphedema.  She denies lightheadedness, orthopnea, PND, palpitations, chest pain and abdominal fullness/bloating.      She is monitoring home weights which have decreased from 344 pounds to 339 pounds.  She does not always follow a low-sodium diet.  She is enrolled in [facility].     Physical Examination Review of Systems   /60 (BP Location: Right arm, Patient Position: Sitting, Cuff Size: Adult Large)   Pulse 87   Resp 16   Ht 1.524 m (5')   Wt  (!) 153.8 kg (339 lb)   BMI 66.21 kg/m    Body mass index is 66.21 kg/m .  Wt Readings from Last 3 Encounters:   11/04/22 (!) 153.8 kg (339 lb)   08/24/22 (!) 157.6 kg (347 lb 8 oz)   05/25/22 148.8 kg (328 lb)       General Appearance:   no acute distress   ENT/Mouth: Wearing mask   EYES:  no scleral icterus, normal conjunctivae   Neck: no thyromegaly   Chest/Lungs:   lungs are clear to auscultation, no rales or wheezing, equal chest wall expansion    Cardiovascular:   Regular. Normal first and second heart sounds with no murmurs, rubs, or gallops, lymphedema   Abdomen:   Obese, bowel sounds are present   Extremities: no cyanosis or clubbing   Skin: no xanthelasma, warm.    Neurologic: normal  bilateral, no tremors     Psychiatric: alert and oriented x3                                              Medical History  Surgical History Family History Social History   Past Medical History:   Diagnosis Date     Acute on chronic diastolic congestive heart failure (H) 02/09/2022     Arthritis 2019    Hips     ASCUS favor benign 08/2015    Neg HPV     Depressive disorder 2010     H/O colposcopy with cervical biopsy 09/14/2015    Bx & ECC - negative     Hypertension 2019     LSIL on Pap smear 07/2014    Neg high risk HPV     Multiple sclerosis (H) 08/29/2005 9/18/200pt dx with MS 2004--dx by neurolgist and is followed by Dr. Harris     Myocardial infarction (H)      Obese      Shingles 10/2016     Sleep apnea      UNSPEC CONSTIPATION 09/18/2006 9/18/2006   Has tried otc suppository and otc lax.     Past Surgical History:   Procedure Laterality Date     CHOLECYSTECTOMY, LAPOROSCOPIC      Cholecystectomy, Laparoscopic     COLONOSCOPY  2007?    Bathroom issue..results normal     COMBINED CYSTOSCOPY, RETROGRADES, EXCHANGE STENT URETER(S) Right 2/21/2022    Procedure: CYSTOSCOPY, WITH right RETROGRADE PYELOGRAM AND right URETERAL STENT PLACEMENT;  Surgeon: Doyle Palomares MD;  Location: [facility] OR     [facility]  REDUCTION INTERNAL FIXATION TOE(S)  4/13/2012    Procedure:OPEN REDUCTION INTERNAL FIXATION TOE(S); Open reduction internal fixation right proximal fifth metatarsal fracture-   Anes-choice block; Surgeon:LEY, JEFFREY DUANE; Location:[place] OR     PICC TRIPLE LUMEN PLACEMENT  2/21/2022         Family History   Problem Relation Age of Onset     Neurologic Disorder Father         MS     Heart Disease Father         irregular heart rate     Diabetes Father      Melanoma Father      Sleep Apnea Father      Other Cancer Father      Cancer Maternal Grandfather         lung     Other Cancer Maternal Grandfather      Cancer Paternal Grandmother         stomach     Other Cancer Paternal Grandmother      Cancer Mother      Other Cancer Mother      Thyroid Disease Mother      Gynecology Maternal Aunt         PCOS     Hypertension Maternal Grandmother      Anxiety Disorder Maternal Grandmother      Thyroid Disease Maternal Grandmother      Anxiety Disorder Sister     Social History     Socioeconomic History     Marital status: Single     Spouse name: Not on file     Number of children: Not on file     Years of education: Not on file     Highest education level: Not on file   Occupational History     Employer: [employer]   Tobacco Use     Smoking status: Never     Smokeless tobacco: Never   Vaping Use     Vaping Use: Never used   Substance and Sexual Activity     Alcohol use: Yes     Comment: social     Drug use: No     Sexual activity: Not Currently     Partners: Male     Birth control/protection: Pill   Other Topics Concern     Parent/sibling w/ CABG, MI or angioplasty before 65F 55M? No   Social History Narrative    , 3rd-5th grade     Social Determinants of Health     Financial Resource Strain: Low Risk      Difficulty of Paying Living Expenses: Not very hard   Food Insecurity: No Food Insecurity     Worried About Running Out of Food in the Last Year: Never true     Ran Out of Food in the Last Year:  Never true   Transportation Needs: No Transportation Needs     Lack of Transportation (Medical): No     Lack of Transportation (Non-Medical): No   Physical Activity: Inactive     Days of Exercise per Week: 0 days     Minutes of Exercise per Session: 0 min   Stress: Not on file   Social Connections: Unknown     Frequency of Communication with Friends and Family: Twice a week     Frequency of Social Gatherings with Friends and Family: Twice a week     Attends Yarsanism Services: Not on file     Active Member of Clubs or Organizations: Not on file     Attends Club or Organization Meetings: Not on file     Marital Status: Not on file   Intimate Partner Violence: Not At Risk     Fear of Current or Ex-Partner: No     Emotionally Abused: No     Physically Abused: No     Sexually Abused: No   Housing Stability: Not on file          Medications  Allergies   Current Outpatient Medications   Medication Sig Dispense Refill     acetaminophen (TYLENOL) 325 MG tablet Take 2 tablets (650 mg) by mouth every 4 hours as needed for mild pain  0     bumetanide (BUMEX) 1 MG tablet Take 3 tablets (3 mg) by mouth 2 times daily 540 tablet 3     Cholecalciferol (VITAMIN D3 PO) Take 10,000 Units by mouth daily        desogestrel-ethinyl estradiol (APRI) 0.15-30 MG-MCG tablet TAKE 1 TABLET DAILY CONTINUOUSLY-OMIT PLACEBPO TABS UNTIL AFTER 3 MONTHS OF HORMONE TABS 112 tablet 3     gabapentin (NEURONTIN) 100 MG capsule Take 1 capsule (100 mg) by mouth in the morning and 1 capsule (100 mg) at noon and 1 capsule (100 mg) in the evening. 540 capsule 1     metolazone (ZAROXOLYN) 2.5 MG tablet 2.5mg tablet one time 20-30 minutes prior to next Bumex dose , labs the following day 5 tablet 0     metoprolol succinate ER (TOPROL XL) 25 MG 24 hr tablet TAKE 1 TABLET(25 MG) BY MOUTH DAILY 90 tablet 1     miconazole (MICATIN) 2 % external powder Apply topically 2 times daily (Patient taking differently: Apply topically 2 times daily as needed)        potassium chloride ER (KLOR-CON M) 20 MEQ CR tablet Take 2 tablets (40 mEq) by mouth daily 180 tablet 3     sertraline (ZOLOFT) 100 MG tablet Take 1 tablet (100 mg) by mouth daily 90 tablet 3     ACE/ARB/ARNI NOT PRESCRIBED (INTENTIONAL) Please choose reason not prescribed from choices below. (Patient not taking: Reported on 11/4/2022)       ASPIRIN NOT PRESCRIBED (INTENTIONAL) Please choose reason not prescribed from choices below. (Patient not taking: Reported on 11/4/2022)      Allergies   Allergen Reactions     Nkda [No Known Drug Allergies]          Lab Results    Chemistry/lipid CBC Cardiac Enzymes/BNP/TSH/INR   Lab Results   Component Value Date    CHOL 173 05/11/2022    HDL 27 (L) 05/11/2022    TRIG 285 (H) 05/11/2022    BUN 18.2 10/04/2022     (L) 10/04/2022    CO2 32 (H) 10/04/2022    Lab Results   Component Value Date    WBC 8.5 05/11/2022    HGB 12.4 05/11/2022    HCT 39.1 05/11/2022    MCV 86 05/11/2022     05/11/2022    Lab Results   Component Value Date    TROPONINI 0.03 04/06/2022     (H) 04/06/2022    TSH 1.52 04/07/2022    INR 1.23 (H) 02/11/2022             This note has been dictated using voice recognition software. Any grammatical, typographical, or context distortions are unintentional and inherent to the software    30 minutes spent on the date of encounter doing chart review, review of outside records, review of test results, interpretation with above tests, patient visit, documentation and discussion with family.

## 2022-11-04 NOTE — LETTER
11/4/2022    Mikala Luna MD  [address]  [facility] [address]    RE: Bess Enamorado       Dear Colleague,     I had the pleasure of seeing Bess Enamorado in the [facility].        Assessment/Recommendations   Assessment:    1.  Heart failure with preserved ejection fraction, NYHA class III: Compensated.  She has fatigue, dyspnea on exertion and lymphedema.  She denies orthopnea or PND.  I increased her Bumex to 4 mg twice a day for a few days due to weight gain.  She has lost 5 pounds in the last 2 days.  She is trying to follow a low-sodium diet.  She is enrolled in open arms meal service.  2.  Hypertension: Controlled.  Blood pressure 100/60  3. NARCISA: She wears her CPAP  4.  Hypokalemia: She has had low potassium in the past.  She is prescribed 40 mEq daily but states she typically only takes 20 mEq daily.  BMP pending today.    Plan:  1.  BMP pending   2.  Continue current medications  3.  Continue monitoring weights through HRS  4.  Continue open arm meals and following a low-salt diet    Bess Enamorado will follow up with Dr Sanchez in January and in the heart failure clinic in 5 months.     History of Present Illness/Subjective    Ms. Bess Enamorado is a 46 year old female seen at [facility] heart failure clinic today for continued follow-up.  Her dad accompanies her today.  She follows up for heart failure with preserved ejection fraction.  She had an echocardiogram in May 2022 which showed ejection fraction of 60%.  She has a past medical history significant for PE, non-STEMI, hypertension, morbid obesity, COVID-19, lymphedema, NARCISA on CPAP, MS.    Today, she continues to have fatigue, dyspnea on exertion and chronic lymphedema.  She denies lightheadedness, orthopnea, PND, palpitations, chest pain and abdominal fullness/bloating.      She is monitoring home weights which have decreased from 344 pounds to 339 pounds.  She does not always follow a low-sodium diet.  She is enrolled  in [facility] arms meals.     Physical Examination Review of Systems   /60 (BP Location: Right arm, Patient Position: Sitting, Cuff Size: Adult Large)   Pulse 87   Resp 16   Ht 1.524 m (5')   Wt (!) 153.8 kg (339 lb)   BMI 66.21 kg/m    Body mass index is 66.21 kg/m .  Wt Readings from Last 3 Encounters:   11/04/22 (!) 153.8 kg (339 lb)   08/24/22 (!) 157.6 kg (347 lb 8 oz)   05/25/22 148.8 kg (328 lb)       General Appearance:   no acute distress   ENT/Mouth: Wearing mask   EYES:  no scleral icterus, normal conjunctivae   Neck: no thyromegaly   Chest/Lungs:   lungs are clear to auscultation, no rales or wheezing, equal chest wall expansion    Cardiovascular:   Regular. Normal first and second heart sounds with no murmurs, rubs, or gallops, lymphedema   Abdomen:   Obese, bowel sounds are present   Extremities: no cyanosis or clubbing   Skin: no xanthelasma, warm.    Neurologic: normal  bilateral, no tremors     Psychiatric: alert and oriented x3                                              Medical History  Surgical History Family History Social History   Past Medical History:   Diagnosis Date     Acute on chronic diastolic congestive heart failure (H) 02/09/2022     Arthritis 2019    Hips     ASCUS favor benign 08/2015    Neg HPV     Depressive disorder 2010     H/O colposcopy with cervical biopsy 09/14/2015    Bx & ECC - negative     Hypertension 2019     LSIL on Pap smear 07/2014    Neg high risk HPV     Multiple sclerosis (H) 08/29/2005 9/18/200pt dx with MS 2004--dx by neurolgist and is followed by Dr. Harris     Myocardial infarction (H)      Obese      Shingles 10/2016     Sleep apnea      UNSPEC CONSTIPATION 09/18/2006 9/18/2006   Has tried otc suppository and otc lax.     Past Surgical History:   Procedure Laterality Date     CHOLECYSTECTOMY, LAPOROSCOPIC      Cholecystectomy, Laparoscopic     COLONOSCOPY  2007?    Bathroom issue..results normal     COMBINED CYSTOSCOPY, RETROGRADES, EXCHANGE  STENT URETER(S) Right 2/21/2022    Procedure: CYSTOSCOPY, WITH right RETROGRADE PYELOGRAM AND right URETERAL STENT PLACEMENT;  Surgeon: Doyle Palomares MD;  [place] [facility] [place]     OPEN REDUCTION INTERNAL FIXATION TOE(S)  4/13/2012    Procedure:OPEN REDUCTION INTERNAL FIXATION TOE(S); Open reduction internal fixation right proximal fifth metatarsal fracture-   Anes-choice block; Surgeon:LEY, JEFFREY DUANE; Location:[place] OR     PICC TRIPLE LUMEN PLACEMENT  2/21/2022         Family History   Problem Relation Age of Onset     Neurologic Disorder Father         MS     Heart Disease Father         irregular heart rate     Diabetes Father      Melanoma Father      Sleep Apnea Father      Other Cancer Father      Cancer Maternal Grandfather         lung     Other Cancer Maternal Grandfather      Cancer Paternal Grandmother         stomach     Other Cancer Paternal Grandmother      Cancer Mother      Other Cancer Mother      Thyroid Disease Mother      Gynecology Maternal Aunt         PCOS     Hypertension Maternal Grandmother      Anxiety Disorder Maternal Grandmother      Thyroid Disease Maternal Grandmother      Anxiety Disorder Sister     Social History     Socioeconomic History     Marital status: Single     Spouse name: Not on file     Number of children: Not on file     Years of education: Not on file     Highest education level: Not on file   Occupational History     Employer: [employer]   Tobacco Use     Smoking status: Never     Smokeless tobacco: Never   Vaping Use     Vaping Use: Never used   Substance and Sexual Activity     Alcohol use: Yes     Comment: social     Drug use: No     Sexual activity: Not Currently     Partners: Male     Birth control/protection: Pill   Other Topics Concern     Parent/sibling w/ CABG, MI or angioplasty before 65F 55M? No   Social History Narrative    , 3rd-5th grade     Social Determinants of Health     Financial Resource Strain: Low Risk       Difficulty of Paying Living Expenses: Not very hard   Food Insecurity: No Food Insecurity     Worried About Running Out of Food in the Last Year: Never true     Ran Out of Food in the Last Year: Never true   Transportation Needs: No Transportation Needs     Lack of Transportation (Medical): No     Lack of Transportation (Non-Medical): No   Physical Activity: Inactive     Days of Exercise per Week: 0 days     Minutes of Exercise per Session: 0 min   Stress: Not on file   Social Connections: Unknown     Frequency of Communication with Friends and Family: Twice a week     Frequency of Social Gatherings with Friends and Family: Twice a week     Attends Advent Services: Not on file     Active Member of Clubs or Organizations: Not on file     Attends Club or Organization Meetings: Not on file     Marital Status: Not on file   Intimate Partner Violence: Not At Risk     Fear of Current or Ex-Partner: No     Emotionally Abused: No     Physically Abused: No     Sexually Abused: No   Housing Stability: Not on file          Medications  Allergies   Current Outpatient Medications   Medication Sig Dispense Refill     acetaminophen (TYLENOL) 325 MG tablet Take 2 tablets (650 mg) by mouth every 4 hours as needed for mild pain  0     bumetanide (BUMEX) 1 MG tablet Take 3 tablets (3 mg) by mouth 2 times daily 540 tablet 3     Cholecalciferol (VITAMIN D3 PO) Take 10,000 Units by mouth daily        desogestrel-ethinyl estradiol (APRI) 0.15-30 MG-MCG tablet TAKE 1 TABLET DAILY CONTINUOUSLY-OMIT PLACEBPO TABS UNTIL AFTER 3 MONTHS OF HORMONE TABS 112 tablet 3     gabapentin (NEURONTIN) 100 MG capsule Take 1 capsule (100 mg) by mouth in the morning and 1 capsule (100 mg) at noon and 1 capsule (100 mg) in the evening. 540 capsule 1     metolazone (ZAROXOLYN) 2.5 MG tablet 2.5mg tablet one time 20-30 minutes prior to next Bumex dose , labs the following day 5 tablet 0     metoprolol succinate ER (TOPROL XL) 25 MG 24 hr tablet TAKE 1  TABLET(25 MG) BY MOUTH DAILY 90 tablet 1     miconazole (MICATIN) 2 % external powder Apply topically 2 times daily (Patient taking differently: Apply topically 2 times daily as needed)       potassium chloride ER (KLOR-CON M) 20 MEQ CR tablet Take 2 tablets (40 mEq) by mouth daily 180 tablet 3     sertraline (ZOLOFT) 100 MG tablet Take 1 tablet (100 mg) by mouth daily 90 tablet 3     ACE/ARB/ARNI NOT PRESCRIBED (INTENTIONAL) Please choose reason not prescribed from choices below. (Patient not taking: Reported on 11/4/2022)       ASPIRIN NOT PRESCRIBED (INTENTIONAL) Please choose reason not prescribed from choices below. (Patient not taking: Reported on 11/4/2022)      Allergies   Allergen Reactions     Nkda [No Known Drug Allergies]          Lab Results    Chemistry/lipid CBC Cardiac Enzymes/BNP/TSH/INR   Lab Results   Component Value Date    CHOL 173 05/11/2022    HDL 27 (L) 05/11/2022    TRIG 285 (H) 05/11/2022    BUN 18.2 10/04/2022     (L) 10/04/2022    CO2 32 (H) 10/04/2022    Lab Results   Component Value Date    WBC 8.5 05/11/2022    HGB 12.4 05/11/2022    HCT 39.1 05/11/2022    MCV 86 05/11/2022     05/11/2022    Lab Results   Component Value Date    TROPONINI 0.03 04/06/2022     (H) 04/06/2022    TSH 1.52 04/07/2022    INR 1.23 (H) 02/11/2022             This note has been dictated using voice recognition software. Any grammatical, typographical, or context distortions are unintentional and inherent to the software    30 minutes spent on the date of encounter doing chart review, review of outside records, review of test results, interpretation with above tests, patient visit, documentation and discussion with family.                  Thank you for allowing me to participate in the care of your patient.      Sincerely,     JULIETA Herbert CNP     [facility]  cc:   JULIETA Herbert CNP  [address], [address]  [place] [address]

## 2022-11-07 DIAGNOSIS — E87.6 HYPOKALEMIA: Primary | ICD-10-CM

## 2022-11-08 NOTE — PROGRESS NOTES
[facility]: [facility] Routine Remote Evaluation      HRS Enrollment Date: 3/16/22                    Initial setup by HRS team    Billing Dates: 9/20/22 through 10/20/22  HF Dx:HFpEF    HRS Alerts During Monitoring Period:   10/3/22 weight   10/7/22 weight     These adjustments have been discussed with the patient/caregiver and they express verbal understanding.    Future Appointments   Date Time Provider Department Center   11/21/2022  4:00 AM [facility]   12/22/2022  4:00 AM [facility]     Will continue with weekly monitoring with HF provider team.     TOTAL INTERACTIVE COMMUNICATION WITH PATIENT DURING THIS BILLING PERIOD: 10 minutes.      I have reviewed Thelma Cifuentes RN's note and agree.    Raúl Sanchez MD., MHS    READINGS:

## 2022-11-16 ENCOUNTER — TELEPHONE (OUTPATIENT)
Dept: CARDIOLOGY | Facility: CLINIC | Age: 47
End: 2022-11-16

## 2022-11-16 NOTE — TELEPHONE ENCOUNTER
Spoke with pt to discuss slow incline in weight over the past week and a half. Pt has gone from 339lbs on 11/4 to 345.4lbs on 11/16.   Pt denies any symptoms at this time and compliant with Bumex 3mg BID with no missed doses. Pt reported she has been having more difficulty with her eating but will try harder to eat less. Encouraged her to call clinic with any concerns or symptoms. Discussed need for possible intervention if weight continues to increase.     Thelma Cifuentes RN    Total time 5 min

## 2022-11-20 ENCOUNTER — HEALTH MAINTENANCE LETTER (OUTPATIENT)
Age: 47
End: 2022-11-20

## 2022-11-21 ENCOUNTER — ALLIED HEALTH/NURSE VISIT (OUTPATIENT)
Dept: CARDIOLOGY | Facility: CLINIC | Age: 47
End: 2022-11-21
Payer: MEDICARE

## 2022-11-21 DIAGNOSIS — I50.32 CHRONIC HEART FAILURE WITH PRESERVED EJECTION FRACTION (H): Primary | ICD-10-CM

## 2022-11-21 PROCEDURE — 99207 PR NO CHARGE LOS: CPT | Performed by: INTERNAL MEDICINE

## 2022-11-21 PROCEDURE — 99454 REM MNTR PHYSIOL PARAM 16-30: CPT | Performed by: INTERNAL MEDICINE

## 2022-12-09 NOTE — PROGRESS NOTES
[facility]: [facility] Routine Remote Evaluation     HRS Enrollment Date: 3/16/22                     Initial setup by [facility] staff   Billing Dates: 10/21/22 through 11/21/222  HF Dx:HFpEF    HRS Alerts During Monitoring Period:   10/31/22 weight   11/16/22 weight      These adjustments have been discussed with the patient/caregiver and they express verbal understanding.    Future Appointments   Date Time Provider Department Center   12/22/2022  4:00 AM [facility]     Will continue with weekly monitoring with HF provider team.     TOTAL INTERACTIVE COMMUNICATION WITH PATIENT DURING THIS BILLING PERIOD: 10 minutes.      I have reviewed Thelma Cifuentes RN's note and agree.    Raúl Sanchez MD., MHS    READINGS:

## 2022-12-12 ENCOUNTER — TELEPHONE (OUTPATIENT)
Dept: CARDIOLOGY | Facility: CLINIC | Age: 47
End: 2022-12-12

## 2022-12-12 NOTE — TELEPHONE ENCOUNTER
Pt called and reported she was unable to refill her Bumex with error of refill too soon.   Called pharmacy who reported pt attempted to refill using gold RX number of her previous Bumex dose. Pharmacist closed all old Bumex orders and will refill her current script for Bumex 3mg BID today.    Thelma Cifuentes RN

## 2022-12-13 ENCOUNTER — HOME INFUSION (PRE-WILLOW HOME INFUSION) (OUTPATIENT)
Dept: PHARMACY | Facility: CLINIC | Age: 47
End: 2022-12-13
Payer: MEDICARE

## 2022-12-19 ENCOUNTER — TELEPHONE (OUTPATIENT)
Dept: CARDIOLOGY | Facility: CLINIC | Age: 47
End: 2022-12-19

## 2022-12-19 NOTE — TELEPHONE ENCOUNTER
Weight increase from 341.5 on 12/14 to 347 today.  Pt reports she was having difficulty picking up her prescription and has been rationing her pills taking only half of her dose for the past 6 days. She picked her medications up today and started her regular dose.     Currently prescribed Bumex 3mg BID.     Denies any worsening symptoms. Will use her prescription and watch sodium and fluid restrictions closely to get her weight back down.   She agreed to contact clinic if she does start experiencing symptoms or feels medication intervention if needed.    Thelma Cifuentes RN    Total time 10 min

## 2022-12-22 ENCOUNTER — ALLIED HEALTH/NURSE VISIT (OUTPATIENT)
Dept: CARDIOLOGY | Facility: CLINIC | Age: 47
End: 2022-12-22
Payer: MEDICARE

## 2022-12-22 DIAGNOSIS — I50.32 CHRONIC HEART FAILURE WITH PRESERVED EJECTION FRACTION (H): Primary | ICD-10-CM

## 2022-12-22 PROCEDURE — 99454 REM MNTR PHYSIOL PARAM 16-30: CPT | Performed by: INTERNAL MEDICINE

## 2022-12-28 NOTE — PROGRESS NOTES
[facility]: HRS Routine Remote Evaluation     HRS Enrollment Date:             3/16/22                               Billing Dates: 11/22/22 through 12/22/22  HF Dx: HFpEF      HRS Alerts During Monitoring Period:   12/19/22 HRS wt     These adjustments have been discussed with the patient/caregiver and they express verbal understanding.           Future Appointments   Date Time Provider Department Center   1/24/2023  4:00 AM [facility]   2/24/2023  4:[facility]     Will continue with weekly monitoring with HF provider team.     TOTAL INTERACTIVE COMMUNICATION WITH PATIENT DURING THIS BILLING PERIOD: 10 minutes.    Irvin EARL RN  BSN    I have reviewed Irvin BERNAL RN, BSN's note and agree.    Raúl Sanchez MD., MHS    READINGS:

## 2023-01-09 ENCOUNTER — TELEPHONE (OUTPATIENT)
Dept: CARDIOLOGY | Facility: CLINIC | Age: 48
End: 2023-01-09

## 2023-01-09 NOTE — TELEPHONE ENCOUNTER
Weight increase shows a 10lbs weight increase from baseline now up to 353.5 lbs.     Pt reports she took half prescribed Bumex for both Saturday and Sunday because she had visitors over the weekend.     Education provided to pt once again regarding importance of remaining compliant with prescribed dose despite circumstances or convenience. Discussed extra stress she is putting on her heart and where this can lead to. Talked about how medication will not work as effectively being significantly fluid overloaded.     Pt reports she will start back on her regular dose today.  Prescribed Bumex 3mg BID.     Denies any symptoms other than weight gain.     To CANDICE Casey, KINDRA    Total time 10 min

## 2023-01-24 ENCOUNTER — ALLIED HEALTH/NURSE VISIT (OUTPATIENT)
Dept: CARDIOLOGY | Facility: CLINIC | Age: 48
End: 2023-01-24
Payer: MEDICARE

## 2023-01-24 DIAGNOSIS — I50.32 CHRONIC HEART FAILURE WITH PRESERVED EJECTION FRACTION (H): Primary | ICD-10-CM

## 2023-01-24 PROCEDURE — 99454 REM MNTR PHYSIOL PARAM 16-30: CPT | Performed by: INTERNAL MEDICINE

## 2023-01-25 NOTE — PROGRESS NOTES
[facility]: [facility] Routine Remote Evaluation     HRS Enrollment Date: 3/16/22  Billing Dates: 12/23/22 through 1/24/23  HF Dx:HFpEF    HRS Alerts During Monitoring Period:   1/9/23  Weight     These adjustments have been discussed with the patient/caregiver and they express verbal understanding.      Future Appointments   Date Time Provider Department Center   2/24/2023  4:00 AM [facility]ARDI[facility]S [facility]     Will continue with weekly monitoring with HF provider team.     TOTAL INTERACTIVE COMMUNICATION WITH PATIENT DURING THIS BILLING PERIOD: 10 minutes.      I have reviewed Thelma Cifuentes RN's note and agree.    Raúl Sanchez MD., MHS    READINGS:

## 2023-02-10 ENCOUNTER — TELEPHONE (OUTPATIENT)
Dept: CARDIOLOGY | Facility: CLINIC | Age: 48
End: 2023-02-10
Payer: MEDICARE

## 2023-02-10 NOTE — TELEPHONE ENCOUNTER
Weight increase on HRS from 344.5 to 350.5 in 4 days.     Pt reports slight increase SOB with activity and legs feel a little tighter but no concerns she would like any interventions for. She reports having friends and family staying with her this weekend and does much better with both fluid and sodium intake with the support of her loved ones. She feels confident she can get the fluid off herself without any medication interventions at this time. Reminded pt that no interventions will be completed until Monday if she chooses not to have any today. Pt understands and feels good with current plan. Encouraged pt to call back if she changes her mind.    Thelma Cifuentes RN    Total time 10 min

## 2023-02-15 ENCOUNTER — TELEPHONE (OUTPATIENT)
Dept: FAMILY MEDICINE | Facility: CLINIC | Age: 48
End: 2023-02-15
Payer: MEDICARE

## 2023-02-15 NOTE — TELEPHONE ENCOUNTER
Patient Quality Outreach    Patient is due for the following:   Mammogram and colonoscopy    Next Steps:   - Schedule annual wellness visit with pap  - Vaccine update  - Complete mammo and colon  - PHQ    Type of outreach:    Sent [place] message.      Questions for provider review:    None     Vero King, CMA

## 2023-02-24 ENCOUNTER — ALLIED HEALTH/NURSE VISIT (OUTPATIENT)
Dept: CARDIOLOGY | Facility: CLINIC | Age: 48
End: 2023-02-24
Payer: MEDICARE

## 2023-02-24 DIAGNOSIS — I50.32 CHRONIC HEART FAILURE WITH PRESERVED EJECTION FRACTION (H): Primary | ICD-10-CM

## 2023-02-24 PROCEDURE — 99454 REM MNTR PHYSIOL PARAM 16-30: CPT | Performed by: INTERNAL MEDICINE

## 2023-02-28 NOTE — PROGRESS NOTES
[facility]: [facility] Routine Remote Evaluation     HRS Enrollment Date: 3/16/22  Billing Dates: 1/25/23 through 2/24/23  HF Dx:HFpEF    HRS Alerts During Monitoring Period:   2/10/23 weight      These adjustments have been discussed with the patient/caregiver and they express verbal understanding.      Future Appointments   Date Time Provider Department Center   3/27/2023  4:00 AM [facility]   4/27/2023  4:00 AM N [facility]     Will continue with weekly monitoring with HF provider team.     TOTAL INTERACTIVE COMMUNICATION WITH PATIENT DURING THIS BILLING PERIOD: 10 minutes.      I have reviewed Thelma Cifuentes RN's note and agree.    Raúl Sanchez MD., MHS    READINGS:

## 2023-03-13 ENCOUNTER — TELEPHONE (OUTPATIENT)
Dept: CARDIOLOGY | Facility: CLINIC | Age: 48
End: 2023-03-13
Payer: MEDICARE

## 2023-03-13 NOTE — TELEPHONE ENCOUNTER
HRS weight alert from 350 to 356.5lbs in 3 days. Pts weighs beginning of Jan sat around 340, but has since slowly increased over past few months.     Patient reports she is feeling very good and was very surprised her weight was up after this weekend. She reports a slight cough but believes it may be due to general cough that nieces in her home had. Her cough feels dry without a crackling noise she hears when it is fluid in the back of her throat.   She was able to have conversation comfortably and denies any increased fatigue, swelling, SOB, or tightness in her abdomen.     Prescribed Bumex 3mg BID. Missed one dose this past Friday or Saturday.     Patient reports she eats pizza on Fridays and [employer] has been another issue with her sodium intake along with general increase caloric intake. She believes weight gain has been due to caloric intake increase over past several months and not related to fluid.     Noted that pt did not schedule F/U appt with [facility] Flan[facility] and asked if she could be transferred to scheduling at that time to better assess weight if related to fluid. She needs to speak with family for a ride but did take the scheduling line to call later and schedule her F/U.    FYI to Key Flanagan, CANDICE Cifuentes RN    Total time 15 min

## 2023-03-13 NOTE — TELEPHONE ENCOUNTER
No new recommendations at this time as patient has eaten high sodium and missed doses of Bumex.  Need to reiterate importance of following a low-salt diet not only for heart failure but for weight loss.  Thank you

## 2023-03-27 ENCOUNTER — ALLIED HEALTH/NURSE VISIT (OUTPATIENT)
Dept: CARDIOLOGY | Facility: CLINIC | Age: 48
End: 2023-03-27
Payer: MEDICARE

## 2023-03-27 DIAGNOSIS — I50.32 CHRONIC DIASTOLIC HEART FAILURE (H): Primary | ICD-10-CM

## 2023-03-27 PROCEDURE — 99207 PR NO CHARGE LOS: CPT | Performed by: INTERNAL MEDICINE

## 2023-03-28 NOTE — PROGRESS NOTES
[facility]: [facility] Routine Remote Evaluation     HRS Enrollment Date:         3/16/22                                  Billing Dates: 2/25/23 through 3/27/23  HF Dx: HFpEF      Unable to bill for this period due to lack of readings.      Future Appointments   Date Time Provider Department Center   4/27/2023  4:00 AM [facility]     Will continue with weekly monitoring with HF provider team.     Irvin EARL RN  BSN    READINGS:

## 2023-04-15 ENCOUNTER — HEALTH MAINTENANCE LETTER (OUTPATIENT)
Age: 48
End: 2023-04-15

## 2023-04-19 ENCOUNTER — TELEPHONE (OUTPATIENT)
Dept: CARDIOLOGY | Facility: CLINIC | Age: 48
End: 2023-04-19
Payer: MEDICARE

## 2023-04-19 NOTE — TELEPHONE ENCOUNTER
Left message with pt letting her know due to many calls prior reminding to do daily readings we have not been getting enough readings for kit to be beneficial along with VMs left unreturned.   Return label is being issued. Let pt know on VM to pack unit in original box to prepare for return. [employer] to stop by in several days to label and ship.    Thelma Cifuentes RN

## 2023-09-16 ENCOUNTER — HEALTH MAINTENANCE LETTER (OUTPATIENT)
Age: 48
End: 2023-09-16

## 2023-09-18 DIAGNOSIS — I50.32 CHRONIC DIASTOLIC CONGESTIVE HEART FAILURE (H): ICD-10-CM

## 2023-09-18 RX ORDER — BUMETANIDE 1 MG/1
3 TABLET ORAL 2 TIMES DAILY
Qty: 180 TABLET | Refills: 0 | Status: SHIPPED | OUTPATIENT
Start: 2023-09-18 | End: 2023-09-27

## 2023-09-25 ASSESSMENT — ENCOUNTER SYMPTOMS
FEVER: 1
MYALGIAS: 0
CHILLS: 1
HEMATOCHEZIA: 0
PARESTHESIAS: 0
NERVOUS/ANXIOUS: 1
NAUSEA: 0
BREAST MASS: 0
WEAKNESS: 1
DIZZINESS: 0
DYSURIA: 0
ARTHRALGIAS: 0
COUGH: 1
SORE THROAT: 0
JOINT SWELLING: 0
HEARTBURN: 0
HEMATURIA: 0
SHORTNESS OF BREATH: 1
CONSTIPATION: 1
PALPITATIONS: 0
DIARRHEA: 0
FREQUENCY: 1
EYE PAIN: 0
ABDOMINAL PAIN: 0
HEADACHES: 0

## 2023-09-25 ASSESSMENT — ACTIVITIES OF DAILY LIVING (ADL)
CURRENT_FUNCTION: TRANSPORTATION REQUIRES ASSISTANCE
CURRENT_FUNCTION: SHOPPING REQUIRES ASSISTANCE

## 2023-09-25 NOTE — COMMUNITY RESOURCES LIST (ENGLISH)
09/25/2023   [facility] [employer]  N/A  For questions about this resource list or additional care needs, please contact your primary care clinic or care manager.  Phone: [phone]   Email: N/A   Address: [address] [place] [id number]   Hours: N/A        Transportation       Free or low-cost transportation  1  [employer] Bus Loop Distance: 14.73 miles      In-Person   [address] [place] [address]  Language: English  Hours: Mon - Fri 9:00 AM - 5:00 PM  Fees: Free   Phone: (563) 408-3918 Email: info@[employer] Website: https://www.[employer]/     2  [employer] Distance: 22.5 miles      In-Person   [address] [place] [address]  Language: English, Indian  Hours: Mon - Sun Open 24 Hours  Fees: Self Pay   Phone: (197) 733-1618 Ext.2 Website: http://www.[employer]     Transportation to medical appointments  3  [employer] Ride Distance: 18.51 miles      In-Person   [address] [address]  Language: English  Hours: Mon - Thu 6:00 AM - 6:00 PM , Fri 6:00 AM - 5:00 PM  Fees: Insurance, Self Pay   Phone: (581) 748-1221 Email: office@[employer] Website: https://www.[employer]/     4  [facility]  [employer] ([employer]) Distance: 20.72 miles      In-Person   [address] [place] [address]  Language: Faroese, Thai, English, Gujarati, Addie, Syriac, Japanese, Malay, Kiswahili, Serbian  Hours: Mon - Wed 9:00 AM - 5:00 PM , Thu 12:00 PM - 6:00 PM , Fri 9:00 AM - 5:00 PM , Sun 10:30 AM - 2:00 PM Appt. Only  Fees: Free   Phone: (396) 961-6650 Email: info@[employer]wa-aifw.org Website: https://www.sewa-aifw.org/          Important Numbers & Websites       Emergency Services   911  [facility]   311  Poison Control   (572) 757-4190  Suicide Prevention Lifeline   (684) 154-4232 (TALK)  Child Abuse Hotline   (681) 163-2175 (4-A-Child)  Sexual Assault Hotline   (630) 236-7337 (HOPE)  [place] Runaway Safeline   (184) 885-4052  (RUNAWAY)  [employer] Talkline   (880) 262-3924  Substance Abuse Referral   (254) 488-9205 (HELP)

## 2023-09-27 ENCOUNTER — LAB (OUTPATIENT)
Dept: LAB | Facility: CLINIC | Age: 48
End: 2023-09-27
Payer: MEDICARE

## 2023-09-27 ENCOUNTER — OFFICE VISIT (OUTPATIENT)
Dept: FAMILY MEDICINE | Facility: CLINIC | Age: 48
End: 2023-09-27
Payer: MEDICARE

## 2023-09-27 VITALS
OXYGEN SATURATION: 90 % | TEMPERATURE: 98 F | HEART RATE: 106 BPM | DIASTOLIC BLOOD PRESSURE: 80 MMHG | RESPIRATION RATE: 24 BRPM | SYSTOLIC BLOOD PRESSURE: 134 MMHG

## 2023-09-27 DIAGNOSIS — R00.0 SINUS TACHYCARDIA: ICD-10-CM

## 2023-09-27 DIAGNOSIS — E66.01 MORBID OBESITY (H): Chronic | ICD-10-CM

## 2023-09-27 DIAGNOSIS — I50.32 CHRONIC DIASTOLIC CONGESTIVE HEART FAILURE (H): ICD-10-CM

## 2023-09-27 DIAGNOSIS — F41.1 GENERALIZED ANXIETY DISORDER: ICD-10-CM

## 2023-09-27 DIAGNOSIS — I10 BENIGN ESSENTIAL HYPERTENSION: ICD-10-CM

## 2023-09-27 DIAGNOSIS — I50.32 CHRONIC HEART FAILURE WITH PRESERVED EJECTION FRACTION (H): ICD-10-CM

## 2023-09-27 DIAGNOSIS — Z00.00 ENCOUNTER FOR MEDICARE ANNUAL WELLNESS EXAM: Primary | ICD-10-CM

## 2023-09-27 DIAGNOSIS — G47.33 OSA ON CPAP: ICD-10-CM

## 2023-09-27 DIAGNOSIS — F33.1 MODERATE EPISODE OF RECURRENT MAJOR DEPRESSIVE DISORDER (H): ICD-10-CM

## 2023-09-27 DIAGNOSIS — E80.6 HYPERBILIRUBINEMIA: ICD-10-CM

## 2023-09-27 PROBLEM — L03.311 CELLULITIS OF ABDOMINAL WALL: Status: RESOLVED | Noted: 2018-10-22 | Resolved: 2023-09-27

## 2023-09-27 PROBLEM — U07.1 COVID-19: Status: RESOLVED | Noted: 2022-02-08 | Resolved: 2023-09-27

## 2023-09-27 PROBLEM — R65.10 SIRS (SYSTEMIC INFLAMMATORY RESPONSE SYNDROME) (H): Status: RESOLVED | Noted: 2021-03-04 | Resolved: 2023-09-27

## 2023-09-27 PROBLEM — L03.90 CELLULITIS: Status: RESOLVED | Noted: 2021-03-05 | Resolved: 2023-09-27

## 2023-09-27 PROBLEM — J18.9 PNEUMONIA DUE TO INFECTIOUS ORGANISM, UNSPECIFIED LATERALITY, UNSPECIFIED PART OF LUNG: Status: RESOLVED | Noted: 2022-02-08 | Resolved: 2023-09-27

## 2023-09-27 LAB
ALBUMIN SERPL BCG-MCNC: 3.9 G/DL (ref 3.5–5.2)
ALP SERPL-CCNC: 69 U/L (ref 35–104)
ALT SERPL W P-5'-P-CCNC: 11 U/L (ref 0–50)
ANION GAP SERPL CALCULATED.3IONS-SCNC: 15 MMOL/L (ref 7–15)
AST SERPL W P-5'-P-CCNC: 24 U/L (ref 0–45)
BILIRUB SERPL-MCNC: 2.4 MG/DL
BUN SERPL-MCNC: 17.3 MG/DL (ref 6–20)
CALCIUM SERPL-MCNC: 10.5 MG/DL (ref 8.6–10)
CHLORIDE SERPL-SCNC: 98 MMOL/L (ref 98–107)
CHOLEST SERPL-MCNC: 93 MG/DL
CREAT SERPL-MCNC: 0.76 MG/DL (ref 0.51–0.95)
DEPRECATED HCO3 PLAS-SCNC: 23 MMOL/L (ref 22–29)
EGFRCR SERPLBLD CKD-EPI 2021: >90 ML/MIN/1.73M2
ERYTHROCYTE [DISTWIDTH] IN BLOOD BY AUTOMATED COUNT: 18 % (ref 10–15)
GLUCOSE SERPL-MCNC: 80 MG/DL (ref 70–99)
HCT VFR BLD AUTO: 47.5 % (ref 35–47)
HDLC SERPL-MCNC: 28 MG/DL
HGB BLD-MCNC: 15.2 G/DL (ref 11.7–15.7)
LDLC SERPL CALC-MCNC: 42 MG/DL
MCH RBC QN AUTO: 27.4 PG (ref 26.5–33)
MCHC RBC AUTO-ENTMCNC: 32 G/DL (ref 31.5–36.5)
MCV RBC AUTO: 86 FL (ref 78–100)
NONHDLC SERPL-MCNC: 65 MG/DL
PLATELET # BLD AUTO: 210 10E3/UL (ref 150–450)
POTASSIUM SERPL-SCNC: 3.7 MMOL/L (ref 3.4–5.3)
PROT SERPL-MCNC: 8.3 G/DL (ref 6.4–8.3)
RBC # BLD AUTO: 5.55 10E6/UL (ref 3.8–5.2)
SODIUM SERPL-SCNC: 136 MMOL/L (ref 135–145)
TRIGL SERPL-MCNC: 116 MG/DL
WBC # BLD AUTO: 5.9 10E3/UL (ref 4–11)

## 2023-09-27 PROCEDURE — 36415 COLL VENOUS BLD VENIPUNCTURE: CPT

## 2023-09-27 PROCEDURE — 90480 ADMN SARSCOV2 VAC 1/ONLY CMP: CPT | Performed by: FAMILY MEDICINE

## 2023-09-27 PROCEDURE — 90686 IIV4 VACC NO PRSV 0.5 ML IM: CPT | Performed by: FAMILY MEDICINE

## 2023-09-27 PROCEDURE — 85027 COMPLETE CBC AUTOMATED: CPT

## 2023-09-27 PROCEDURE — 80053 COMPREHEN METABOLIC PANEL: CPT

## 2023-09-27 PROCEDURE — 99214 OFFICE O/P EST MOD 30 MIN: CPT | Mod: 25 | Performed by: FAMILY MEDICINE

## 2023-09-27 PROCEDURE — 91320 SARSCV2 VAC 30MCG TRS-SUC IM: CPT | Performed by: FAMILY MEDICINE

## 2023-09-27 PROCEDURE — G0008 ADMIN INFLUENZA VIRUS VAC: HCPCS | Performed by: FAMILY MEDICINE

## 2023-09-27 PROCEDURE — 80061 LIPID PANEL: CPT

## 2023-09-27 PROCEDURE — G0438 PPPS, INITIAL VISIT: HCPCS | Performed by: FAMILY MEDICINE

## 2023-09-27 RX ORDER — SERTRALINE HYDROCHLORIDE 100 MG/1
100 TABLET, FILM COATED ORAL DAILY
Qty: 90 TABLET | Refills: 3 | Status: SHIPPED | OUTPATIENT
Start: 2023-09-27 | End: 2024-09-25

## 2023-09-27 RX ORDER — METOPROLOL SUCCINATE 25 MG/1
25 TABLET, EXTENDED RELEASE ORAL DAILY
Qty: 90 TABLET | Refills: 3 | Status: ON HOLD | OUTPATIENT
Start: 2023-09-27 | End: 2024-07-23

## 2023-09-27 RX ORDER — BUMETANIDE 1 MG/1
3 TABLET ORAL DAILY
Qty: 270 TABLET | Refills: 3 | Status: SHIPPED | OUTPATIENT
Start: 2023-09-27 | End: 2024-04-04

## 2023-09-27 ASSESSMENT — ANXIETY QUESTIONNAIRES
5. BEING SO RESTLESS THAT IT IS HARD TO SIT STILL: NOT AT ALL
3. WORRYING TOO MUCH ABOUT DIFFERENT THINGS: SEVERAL DAYS
7. FEELING AFRAID AS IF SOMETHING AWFUL MIGHT HAPPEN: NOT AT ALL
6. BECOMING EASILY ANNOYED OR IRRITABLE: SEVERAL DAYS
2. NOT BEING ABLE TO STOP OR CONTROL WORRYING: SEVERAL DAYS
GAD7 TOTAL SCORE: 5
GAD7 TOTAL SCORE: 5
1. FEELING NERVOUS, ANXIOUS, OR ON EDGE: MORE THAN HALF THE DAYS

## 2023-09-27 ASSESSMENT — PATIENT HEALTH QUESTIONNAIRE - PHQ9
5. POOR APPETITE OR OVEREATING: NOT AT ALL
SUM OF ALL RESPONSES TO PHQ QUESTIONS 1-9: 8
SUM OF ALL RESPONSES TO PHQ QUESTIONS 1-9: 8
10. IF YOU CHECKED OFF ANY PROBLEMS, HOW DIFFICULT HAVE THESE PROBLEMS MADE IT FOR YOU TO DO YOUR WORK, TAKE CARE OF THINGS AT HOME, OR GET ALONG WITH OTHER PEOPLE: NOT DIFFICULT AT ALL

## 2023-09-27 ASSESSMENT — ENCOUNTER SYMPTOMS
PALPITATIONS: 0
PARESTHESIAS: 0
NERVOUS/ANXIOUS: 1
SHORTNESS OF BREATH: 1
CONSTIPATION: 1
BREAST MASS: 0
ABDOMINAL PAIN: 0
HEMATOCHEZIA: 0
SORE THROAT: 0
FREQUENCY: 1
NAUSEA: 0
ARTHRALGIAS: 0
DIARRHEA: 0
HEMATURIA: 0
CHILLS: 1
DIZZINESS: 0
EYE PAIN: 0
JOINT SWELLING: 0
DYSURIA: 0
FEVER: 1
HEARTBURN: 0
COUGH: 1
MYALGIAS: 0
HEADACHES: 0
WEAKNESS: 1

## 2023-09-27 ASSESSMENT — ACTIVITIES OF DAILY LIVING (ADL)
CURRENT_FUNCTION: SHOPPING REQUIRES ASSISTANCE
CURRENT_FUNCTION: TRANSPORTATION REQUIRES ASSISTANCE

## 2023-09-27 ASSESSMENT — PAIN SCALES - GENERAL: PAINLEVEL: NO PAIN (0)

## 2023-09-27 NOTE — PATIENT INSTRUCTIONS
Follow up with cardiology [phone]     Sleep consultation for the cpap machines    Restart sertraline and metoprolol xl 25 mg    See me in 6 months

## 2023-09-27 NOTE — COMMUNITY RESOURCES LIST (ENGLISH)
09/27/2023   [facility] - Outpatient Clinics  N/A  For additional resource needs, please contact your health insurance member services or your primary care team.  Phone: [phone]   Email: N/A   Address: [facility] [place] [address]   Hours: N/A        Transportation       Free or low-cost transportation  1  [employer] Bus Loop Distance: 14.73 miles      In-Person   [address] [place] [address]  Language: English  Hours: Mon - Fri 9:00 AM - 5:00 PM  Fees: Free   Phone: (939) 267-6483 Email: info@[employer] Website: https://www.[employer]/     2  Met[employer] Transit Distance: 22.5 miles      In-Person   [address] [place] [address]  Language: English, Kittitian  Hours: Mon - Sun Open 24 Hours  Fees: Self Pay   Phone: (721) 549-5350 Ext.2 Website: http://www.[employer]     Transportation to medical appointments  3  [employer] Ride Distance: 18.51 miles      In-Person   [address] [address]  Language: English  Hours: Mon - Thu 6:00 AM - 6:00 PM , Fri 6:00 AM - 5:00 PM  Fees: Insurance, Self Pay   Phone: (531) 761-9086 Email: office@[employer] Website: https://www.[employer]/     4  [facility] -  [employer] [facility] (AIFW) Distance: 20.72 miles      In-Person   [address] [place] [address]  Language: Kazakh, Hungarian, English, Gujarati, Addie, Uzbek, Hungarian, Frisian, Vietnamese, Pashto  Hours: Mon - Wed 9:00 AM - 5:00 PM , Thu 12:00 PM - 6:00 PM , Fri 9:00 AM - 5:00 PM , Sun 10:30 AM - 2:00 PM Appt. Only  Fees: Free   Phone: (557) 525-2535 Email: info@sewa-aifw.org Website: https://www.sewa-aifw.org/          Important Numbers & Websites       [address]  Poison Control   (745) 315-6853 [place]poison.[place]  Suicide and Crisis Lifeline   988 988[facility] Children's Hospitalline.org  Childhelp [place] Child Abuse Hotline   653.796.5730 Childhelphotline.org  [place] Sexual Assault Hotline   (636) 813-9704 (HOPE)  Rainn.org  National Runaway Safeline   (866) 962-2009 (RUNAWAY) 1800runaway.org  Pregnancy & Postpartum Support [place]   Call/text 166-259-0507 Ppsupport[place].org  Substance Abuse National Helpline ([facility]   691-308-HELP (9679) Findtreatment.gov  Emergency Services   912

## 2023-09-27 NOTE — COMMUNITY RESOURCES LIST (ENGLISH)
09/27/2023   [facility] Post[facility]7  N/A  For questions about this resource list or additional care needs, please contact your primary care clinic or care manager.  Phone: [phone]   Email: N/A   Address: [address] [place] [id number]   Hours: N/A        Transportation       Free or low-cost transportation  1  [employer] Bus Loop Distance: 14.73 miles      In-Person   [address] [place] [address]  Language: English  Hours: Mon - Fri 9:00 AM - 5:00 PM  Fees: Free   Phone: (594) 778-2687 Email: info@[employer] Website: https://www.[employer]/     2  Met[employer] Transit Distance: 22.5 miles      In-Person   [address] [place] [address]  Language: English, Vietnamese  Hours: Mon - Sun Open 24 Hours  Fees: Self Pay   Phone: (788) 846-1816 Ext.2 Website: http://www.Contapps.ORVIBO     Transportation to medical appointments  3  [employer] Ride Distance: 18.51 miles      In-Person   [address] [address]  Language: English  Hours: Mon - Thu 6:00 AM - 6:00 PM , Fri 6:00 AM - 5:00 PM  Fees: Insurance, Self Pay   Phone: (161) 131-3955 Email: office@[employer] Website: https://www.[employer]/     4  [facility]  [facility] (AIFW) Distance: 20.72 miles      In-Person   [address] [place] [address]  Language: Malay, Faroese, English, Gujarati, Addie, Lithuanian, Mohawk, Malay, Hebrew, Slovak  Hours: Mon - Wed 9:00 AM - 5:00 PM , Thu 12:00 PM - 6:00 PM , Fri 9:00 AM - 5:00 PM , Sun 10:30 AM - 2:00 PM Appt. Only  Fees: Free   Phone: (111) 939-1630 Email: info@sewa-aifw.org Website: https://www.sewa-aifw.org/          Important Numbers & Websites       Emergency Services   911  [facility] Services   311  Poison Control   (773) 521-6772  Suicide Prevention Lifeline   (661) 404-6088 (TALK)  Child Abuse Hotline   (213) 353-6095 (4-A-Child)  Sexual Assault Hotline   (268) 394-7577 (HOPE)  [place] Runaway Safeline   (365) 739-3374  (RUNAWAY)  All-Options Talkline   (155) 863-6397  Substance Abuse Referral   (307) 283-3389 (HELP)

## 2023-09-27 NOTE — PROGRESS NOTES
"SUBJECTIVE:   Bess is a 47 year old who presents for Preventive Visit.      9/27/2023     3:43 PM   Additional Questions   Roomed by Vero REAL CMA   Accompanied by Self       Are you in the first 12 months of your Medicare coverage?  No    - The only medication she is currently taking is Bumex 3mg every day    Healthy Habits:     In general, how would you rate your overall health?  Fair    Frequency of exercise:  None    Do you usually eat at least 4 servings of fruit and vegetables a day, include whole grains    & fiber and avoid regularly eating high fat or \"junk\" foods?  No    Taking medications regularly:  No    Barriers to taking medications:  Other    Medication side effects:  None    Ability to successfully perform activities of daily living:  Transportation requires assistance and shopping requires assistance    Home Safety:  No safety concerns identified    Hearing Impairment:  Difficulty understanding soft or whispered speech    In the past 6 months, have you been bothered by leaking of urine? Yes    In general, how would you rate your overall mental or emotional health?  Fair    Additional concerns today:  Yes      - Sleep apnea. Refill CPAP supplies      Patient has been not taking most of her medications over the past several months.  Had access to them, just stopped taking.  More due to mental health problems and apathy from the sounds of it.   She is continuing to work with [employer] every two weeks virtually.    No longer has home health/OT/PT    She is usually in a wheelchair at home, does use walker some.  Drove herself here today but had  very hard time getting into the car, after she was in the car, it was okay to drive.    Have you ever done Advance Care Planning? (For example, a Health Directive, POLST, or a discussion with a medical provider or your loved ones about your wishes): Yes, advance care planning is on file.       Fall risk       Cognitive Screening   1) Repeat 3 items (Leader, " Season, Table)    2) Clock draw: NORMAL  3) 3 item recall: Recalls 3 objects  Results: 3 items recalled: COGNITIVE IMPAIRMENT LESS LIKELY    Mini-CogTM Copyright MAN Shea. Licensed by the author for use in [facility]; reprinted with permission (nahid@[facility]). All rights reserved.      Do you have sleep apnea, excessive snoring or daytime drowsiness? : yes    Reviewed and updated as needed this visit by clinical staff   Tobacco  Allergies  Meds              Reviewed and updated as needed this visit by Provider                 Social History     Tobacco Use    Smoking status: Never    Smokeless tobacco: Never   Substance Use Topics    Alcohol use: Yes     Comment: social             9/25/2023    12:32 PM   Alcohol Use   Prescreen: >3 drinks/day or >7 drinks/week? No     Do you have a current opioid prescription? No  Do you use any other controlled substances or medications that are not prescribed by a provider? None      Current providers sharing in care for this patient include:   Patient Care Team:  Mikala Luna MD as PCP - General (Family Practice)  Mikala Luna MD as Assigned PCP  Alan Roberts MD as MD (OB/Gyn)  Valeria Culver [facility] as Pharmacist (Pharmacist)  Mikala Luna MD as Referring Physician (Family Medicine)  Frank Farmer MD as MD (Dermatology)  Frank Farmer MD as Assigned Surgical Provider  Onur Chaudhry MD as Assigned Pulmonology Provider  Valeria Culver [facility] as Assigned MTM Pharmacist  Key Flanagan APRN CNP as Assigned Heart and Vascular Provider    The following health maintenance items are reviewed in Epic and correct as of today:  Health Maintenance   Topic Date Due    HF ACTION PLAN  Never done    HEPATITIS B IMMUNIZATION (1 of 3 - 3-dose series) Never done    Pneumococcal Vaccine: Pediatrics (0 to 5 Years) and At-Risk Patients (6 to 64 Years) (2 - PCV) 06/20/2006    MAMMO SCREENING  12/01/2006     COLORECTAL CANCER SCREENING  02/15/2017    DTAP/TDAP/TD IMMUNIZATION (4 - Td or Tdap) 04/27/2019    HPV TEST  10/20/2021    PAP  10/20/2021    ALT  02/20/2023    BMP  05/04/2023    LIPID  05/11/2023    CBC  05/11/2023    COVID-19 Vaccine (3 - Moderna series) 11/22/2023    PHQ-9  03/27/2024    MEDICARE ANNUAL WELLNESS VISIT  09/27/2024    ANNUAL REVIEW OF HM ORDERS  09/27/2024    ADVANCE CARE PLANNING  09/27/2028    TSH W/FREE T4 REFLEX  Completed    HIV SCREENING  Completed    DEPRESSION ACTION PLAN  Completed    INFLUENZA VACCINE  Completed    IPV IMMUNIZATION  Aged Out    HPV IMMUNIZATION  Aged Out    MENINGITIS IMMUNIZATION  Aged Out    HEPATITIS C SCREENING  Discontinued     Lab work is in process  Labs reviewed in [facility]          9/25/2023    12:34 PM   Breast CA Risk Assessment (FHS-7)   Do you have a family history of breast, colon, or ovarian cancer? No / Unknown       Pertinent mammograms are reviewed under the imaging tab.    Review of Systems   Constitutional:  Positive for chills and fever.   HENT:  Negative for congestion, ear pain, hearing loss and sore throat.    Eyes:  Negative for pain and visual disturbance.   Respiratory:  Positive for cough and shortness of breath.    Cardiovascular:  Positive for peripheral edema. Negative for chest pain and palpitations.   Gastrointestinal:  Positive for constipation. Negative for abdominal pain, diarrhea, heartburn, hematochezia and nausea.   Breasts:  Negative for tenderness, breast mass and discharge.   Genitourinary:  Positive for frequency and urgency. Negative for dysuria, genital sores, hematuria, pelvic pain, vaginal bleeding and vaginal discharge.   Musculoskeletal:  Negative for arthralgias, joint swelling and myalgias.   Skin:  Negative for rash.   Neurological:  Positive for weakness. Negative for dizziness, headaches and paresthesias.   Psychiatric/Behavioral:  Positive for mood changes. The patient is nervous/anxious.        OBJECTIVE:   /80    Pulse 106   Temp 98  F (36.7  C) (Tympanic)   Resp 24   LMP 09/08/2023 (Approximate)   SpO2 90%  Estimated body mass index is 66.21 kg/m  as calculated from the following:    Height as of 11/4/22: 1.524 m (5').    Weight as of 11/4/22: 153.8 kg (339 lb).  Physical Exam  GENERAL: alert, no distress, obese, and seated in a wheelchair  NECK: no adenopathy, no asymmetry, masses, or scars and thyroid normal to palpation  RESP: lungs clear to auscultation - no rales, rhonchi or wheezes  CV: tachycardia, normal S1 S2, no S3 or S4, and no murmur, click or rub  MS: bilateral compression wraps in place    Results for orders placed or performed in visit on 09/27/23   Lipid panel reflex to direct LDL Non-fasting     Status: Abnormal   Result Value Ref Range    Cholesterol 93 <200 mg/dL    Triglycerides 116 <150 mg/dL    Direct Measure HDL 28 (L) >=50 mg/dL    LDL Cholesterol Calculated 42 <=100 mg/dL    Non HDL Cholesterol 65 <130 mg/dL    Narrative    Cholesterol  Desirable:  <200 mg/dL    Triglycerides  Normal:  Less than 150 mg/dL  Borderline High:  150-199 mg/dL  High:  200-499 mg/dL  Very High:  Greater than or equal to 500 mg/dL    Direct Measure HDL  Female:  Greater than or equal to 50 mg/dL   Male:  Greater than or equal to 40 mg/dL    LDL Cholesterol  Desirable:  <100mg/dL  Above Desirable:  100-129 mg/dL   Borderline High:  130-159 mg/dL   High:  160-189 mg/dL   Very High:  >= 190 mg/dL    Non HDL Cholesterol  Desirable:  130 mg/dL  Above Desirable:  130-159 mg/dL  Borderline High:  160-189 mg/dL  High:  190-219 mg/dL  Very High:  Greater than or equal to 220 mg/dL   CBC with Platelets     Status: Abnormal   Result Value Ref Range    WBC Count 5.9 4.0 - 11.0 10e3/uL    RBC Count 5.55 (H) 3.80 - 5.20 10e6/uL    Hemoglobin 15.2 11.7 - 15.7 g/dL    Hematocrit 47.5 (H) 35.0 - 47.0 %    MCV 86 78 - 100 fL    MCH 27.4 26.5 - 33.0 pg    MCHC 32.0 31.5 - 36.5 g/dL    RDW 18.0 (H) 10.0 - 15.0 %    Platelet Count 210 150  "- 450 10e3/uL   Comprehensive metabolic panel (BMP + Alb, Alk Phos, ALT, AST, Total. Bili, TP)     Status: Abnormal   Result Value Ref Range    Sodium 136 135 - 145 mmol/L    Potassium 3.7 3.4 - 5.3 mmol/L    Carbon Dioxide (CO2) 23 22 - 29 mmol/L    Anion Gap 15 7 - 15 mmol/L    Urea Nitrogen 17.3 6.0 - 20.0 mg/dL    Creatinine 0.76 0.51 - 0.95 mg/dL    GFR Estimate >90 >60 mL/min/1.73m2    Calcium 10.5 (H) 8.6 - 10.0 mg/dL    Chloride 98 98 - 107 mmol/L    Glucose 80 70 - 99 mg/dL    Alkaline Phosphatase 69 35 - 104 U/L    AST 24 0 - 45 U/L    ALT 11 0 - 50 U/L    Protein Total 8.3 6.4 - 8.3 g/dL    Albumin 3.9 3.5 - 5.2 g/dL    Bilirubin Total 2.4 (H) <=1.2 mg/dL             ASSESSMENT / PLAN:   (Z00.00) Encounter for Medicare annual wellness exam  (primary encounter diagnosis)  Comment:    Plan:      (I10) Benign essential hypertension  Comment: fairly well controlled  Plan: Comprehensive metabolic panel (BMP + Alb, Alk         Phos, ALT, AST, Total. Bili, TP), metoprolol         succinate ER (TOPROL XL) 25 MG 24 hr tablet           (I50.32) Chronic heart failure with preserved ejection fraction (H)  (I50.32) Chronic diastolic congestive heart failure (H)  Comment: encouraged to get back into cardiology for updated echo, exam, monitoring  She previously had at home monitoring but was inconsistent with use and ultimately found to be non-compliant and the monitoring ended  Plan: Lipid panel reflex to direct LDL Non-fasting,         CBC with Platelets, bumetanide (BUMEX) 1 MG         tablet        Continue bumex 3 mg daily - she is unable to tolerate twice daily because of the amount of time she feels \"tied\" to the bathroom.  She has mobility issues and needs to spend 4-6 hours near her bathroom or commode and cannot do that twice a day.    (G47.33,  Z99.89) NARCISA on CPAP  Comment: needs new mask - needs follow up with sleep clinic to have appropriate supplies ordered  Plan: Adult Sleep Eval & Management     "      Referral             (E66.01) Morbid obesity -- BMI 56.0  Comment: contributes to overall risk of hypertension, heart failure, etc  Plan:      (F41.1) Generalized anxiety disorder  (F33.1) Moderate episode of recurrent major depressive disorder (H)  Comment: stopped medication, realizes she needs to be back on medication  Continues to see a therapist twice a month  Plan: sertraline (ZOLOFT) 100 MG tablet           (R00.0) Sinus tachycardia  Comment: restart metoprolol  Plan: metoprolol succinate ER (TOPROL XL) 25 MG 24 hr        tablet             (E80.6) Hyperbilirubinemia  Comment: has been present off and on over several years, likely [place]   Plan:      Patient has been advised of split billing requirements and indicates understanding: Yes      COUNSELING:        BMI:   Estimated body mass index is 66.21 kg/m  as calculated from the following:    Height as of 11/4/22: 1.524 m (5').    Weight as of 11/4/22: 153.8 kg (339 lb).   Weight management plan: Discussed healthy diet and exercise guidelines      She reports that she has never smoked. She has never used smokeless tobacco.      Appropriate preventive services were discussed with this patient, including applicable screening as appropriate for cardiovascular disease, diabetes, osteopenia/osteoporosis, and glaucoma.  As appropriate for age/gender, discussed screening for colorectal cancer, prostate cancer, breast cancer, and cervical cancer. Checklist reviewing preventive services available has been given to the patient.    Reviewed patients plan of care and provided an AVS. The Basic Care Plan (routine screening as documented in Health Maintenance) for Bess meets the Care Plan requirement. This Care Plan has been established and reviewed with the Patient.          Mikala Luna MD  [facility]    Identified Health Risks:

## 2023-10-02 ASSESSMENT — SLEEP AND FATIGUE QUESTIONNAIRES
HOW LIKELY ARE YOU TO NOD OFF OR FALL ASLEEP WHILE LYING DOWN TO REST IN THE AFTERNOON WHEN CIRCUMSTANCES PERMIT: MODERATE CHANCE OF DOZING
HOW LIKELY ARE YOU TO NOD OFF OR FALL ASLEEP IN A CAR, WHILE STOPPED FOR A FEW MINUTES IN TRAFFIC: WOULD NEVER DOZE
HOW LIKELY ARE YOU TO NOD OFF OR FALL ASLEEP WHILE SITTING QUIETLY AFTER LUNCH WITHOUT ALCOHOL: SLIGHT CHANCE OF DOZING
HOW LIKELY ARE YOU TO NOD OFF OR FALL ASLEEP WHEN YOU ARE A PASSENGER IN A CAR FOR AN HOUR WITHOUT A BREAK: WOULD NEVER DOZE
HOW LIKELY ARE YOU TO NOD OFF OR FALL ASLEEP WHILE SITTING INACTIVE IN A PUBLIC PLACE: WOULD NEVER DOZE
HOW LIKELY ARE YOU TO NOD OFF OR FALL ASLEEP WHILE WATCHING TV: SLIGHT CHANCE OF DOZING
HOW LIKELY ARE YOU TO NOD OFF OR FALL ASLEEP WHILE SITTING AND TALKING TO SOMEONE: WOULD NEVER DOZE
HOW LIKELY ARE YOU TO NOD OFF OR FALL ASLEEP WHILE SITTING AND READING: MODERATE CHANCE OF DOZING

## 2023-10-03 ENCOUNTER — VIRTUAL VISIT (OUTPATIENT)
Dept: SLEEP MEDICINE | Facility: CLINIC | Age: 48
End: 2023-10-03
Payer: MEDICARE

## 2023-10-03 DIAGNOSIS — G47.33 OSA ON CPAP: ICD-10-CM

## 2023-10-03 PROCEDURE — 99213 OFFICE O/P EST LOW 20 MIN: CPT | Mod: 95 | Performed by: PHYSICIAN ASSISTANT

## 2023-10-03 ASSESSMENT — PAIN SCALES - GENERAL: PAINLEVEL: NO PAIN (0)

## 2023-10-03 NOTE — NURSING NOTE
Patient denies any changes since echeck-in regarding medication and allergies and states all information entered during echeck-in remains accurate.    Is the patient currently in the state of [place]? YES    Visit mode:VIDEO    If the visit is dropped, the patient can be reconnected by: VIDEO VISIT: Send to e-mail at: [email or web address]    Will anyone else be joining the visit? NO  (If patient encounters technical issues they should call [phone])    How would you like to obtain your AVS? MyChart    Are changes needed to the allergy or medication list? No, Pt stated no changes to allergies, and Pt stated no med changes    Reason for visit: Follow Up (Follow up to discuss new supplies, no updates per pt. Medications/allergies reviewed by pt via [employer], no changes per pt. No pt reported vitals today per pt. )    Has patient had flu shot for current/most recent flu season? If so, when? Yes: 9/27/2023    Lexi Ellison, Visit Facilitator/MA.

## 2023-10-03 NOTE — PROGRESS NOTES
Virtual Visit Details    Type of service:  Video Visit   Video Start Time: 1:03PM  Video End Time:1:15PM  Originating Location (pt. Location): Home    Distant Location (provider location):  Off-site  Platform used for Video Visit: [facility]   Outpatient Sleep Medicine  Oct 3, 2023       Name: Bess Enamorado MRN# [id number]   Age: 47 year old YOB: 1975            Assessment and Plan:   1. NARCISA on CPAP  Patient's sleep apnea is adequately managed and well treated with current PAP settings 10-45mtN9U with low residual AHI of 0.9 events per hour. Compliance is excellent, using daily for well over 4 hours a day. Daytime symptoms and nighttime sleep quality are improved on CPAP. Typically tolerates PAP very well but current mask is broken and taped together so having some more issues as of recent. Prescription updated for all new mask/supplies today and encouraged her to switch everything out if it has been 3 years since last doing so as she states. No indication to adjust settings on machine. Will see her back in about 1 year for annual PAP visit or sooner prn.   - Comprehensive DME       Chief Complaint      Chief Complaint   Patient presents with    Follow Up     Follow up to discuss new supplies, no updates per pt. Medications/allergies reviewed by pt via [employer], no changes per pt. No pt reported vitals today per pt.           History of Present Illness:     Bess Enamorado is a 47 year 47-year-old female with morbid obesity, PCOS, MS, hypertension, CHF, CAD, anxiety, depression, severe NARCISA presents to clinic today for follow-up.  Last seen by MIGDALIA Haro 5/2021.     Polysomnography - Test date 8/9/2017 (320#):AHI 45.9, basline SpO2 92.9%, mike SpO2 54.9%, time of SpO2 <= 88% of 31.7 minutes.  Severe desaturations and sustained hypoxemia confined to singular supine REM period (no lateral REM observed for comparison).  Also seen to have TCM increase by ~15 mmHg over  "baseline in supine REM, consistent with hypoventilation.  Pre-study VBG was WNL. CPAP titrated to 10 cm H2O and appeared optimal, including supine REM, with normalization of SpO2 and TCM normalized to low-40's.  Seen to have frequent PLM's (64.3 / hour on diagnostic, 89.6 / hour on treatment, ~20% associated with cortical arousals).     Presents to my clinic today \"because I just need new equipment\". No concerns or complaints today other than broken supplies. Patient is using a full face mask. Current mask is 3 years old and taped together - \"the velcro is all done that is taped to stay together and the mask where the hose is doesn't stay in so that is taped too\". The mask is leaking. Can wake with dry mouth but \"not excessive\". No nasal dryness or epistaxis. They are not snoring with the mask on. They are not having gasp arousals.  The pressure settings are comfortable. Overall, the patient rates their experience with PAP as 9 (0 poor, 10 great).     Patient sleeps in her recliner. When asked about sleep schedule states \"I am unemployed so I don't have one\". Often goes to sit in her chair around 10:00PM and up out of chair at 9:00AM. Unclear exactly when she is sleeping during this time as she states she wakes every 2 hours to use the restroom and can be up hours between pending MS/lymphedema control. Also uses her CPAP during the day if sitting down to relax .    ResMed Auto-PAP 10-16 cmH2O download:  30 total days of use. 0 nonuse days. 28 days with >4 hours use.  Average use 12 hours 53 minutes per day. Median Leak 16.7 L/min. 95%ile Leak 60.1 L/min. CPAP 95% pressure 13.9cm. AHI 0.9    SCALES:   INSOMNIA: Insomnia Severity Score: 4/28   SLEEPINESS: [place] Sleepiness Score: 6/24    Past medical/surgical history, family history, social history, medications and allergies were reviewed.           Physical Examination:   [facility] 09/08/2023 (Approximate)   General appearance: Awake, alert, cooperative. Well groomed. " Sitting comfortably in chair. In no apparent distress.  HEENT: Head: Normocephalic, atraumatic. Eyes:Conjunctiva clear. Sclera normal. Nose: External appearance without deformity.   Pulmonary:  Able to speak easily in full sentences. No cough or wheeze.   Skin:  No rashes or significant lesions on visible skin.   Neurologic: Alert, oriented x3.   Psychiatric: Mood euthymic. Affect congruent with full range and intensity.      CC:  Mikala Luna PA-C  Oct 3, 2023     [facility]  [address] [place] [address]     [facility]  [address] [address]    Chart documentation was completed, in part, with [employer] voice-recognition software. Even though reviewed, some grammatical, spelling, and word errors may remain.    24 minutes spent on day of encounter doing chart review, history and exam, documentation, and further activities as noted above

## 2023-10-13 ENCOUNTER — TELEPHONE (OUTPATIENT)
Dept: FAMILY MEDICINE | Facility: CLINIC | Age: 48
End: 2023-10-13
Payer: MEDICARE

## 2023-10-13 NOTE — TELEPHONE ENCOUNTER
Forms/Letter Request    Type of form/letter:  [employer] Disability    Do we have the form/letter: Yes. Does pt need an appointment for this to be filled out?    Who is the form from? Insurance comp    Where did/will the form come from? Patient or family brought in       When is form/letter needed by: asap    How would you like the form/letter returned:  call [phone]    Patient Notified form requests are processed in 3-5 business days:Yes    Could we send this information to you in [employer] or would you prefer to receive a phone call?:   Patient would prefer a phone call   Okay to leave a detailed message?: Yes at Cell number on file:    Telephone Information:   [phone]      .Monika Luna PSC

## 2023-10-16 NOTE — TELEPHONE ENCOUNTER
Patient Contact     Attempt # 1     Was call answered?  No.  Left message on voicemail with information to call back.     Patient needs an office visit for Dr. Luna to complete this form.    Isabella Salinas RN on 10/16/2023 at 1:14 PM

## 2023-10-19 ENCOUNTER — VIRTUAL VISIT (OUTPATIENT)
Dept: FAMILY MEDICINE | Facility: CLINIC | Age: 48
End: 2023-10-19
Payer: MEDICARE

## 2023-10-19 ENCOUNTER — TELEPHONE (OUTPATIENT)
Dept: FAMILY MEDICINE | Facility: CLINIC | Age: 48
End: 2023-10-19

## 2023-10-19 DIAGNOSIS — F33.1 MODERATE EPISODE OF RECURRENT MAJOR DEPRESSIVE DISORDER (H): Chronic | ICD-10-CM

## 2023-10-19 DIAGNOSIS — I50.32 CHRONIC HEART FAILURE WITH PRESERVED EJECTION FRACTION (H): ICD-10-CM

## 2023-10-19 DIAGNOSIS — G35 MULTIPLE SCLEROSIS (H): Primary | Chronic | ICD-10-CM

## 2023-10-19 DIAGNOSIS — I89.0 LYMPHEDEMA OF BOTH LOWER EXTREMITIES: ICD-10-CM

## 2023-10-19 DIAGNOSIS — F41.1 GENERALIZED ANXIETY DISORDER: Chronic | ICD-10-CM

## 2023-10-19 PROCEDURE — 99214 OFFICE O/P EST MOD 30 MIN: CPT | Mod: VID | Performed by: FAMILY MEDICINE

## 2023-10-19 ASSESSMENT — PATIENT HEALTH QUESTIONNAIRE - PHQ9
SUM OF ALL RESPONSES TO PHQ QUESTIONS 1-9: 9
5. POOR APPETITE OR OVEREATING: NOT AT ALL

## 2023-10-19 ASSESSMENT — ANXIETY QUESTIONNAIRES
GAD7 TOTAL SCORE: 0
1. FEELING NERVOUS, ANXIOUS, OR ON EDGE: NOT AT ALL
5. BEING SO RESTLESS THAT IT IS HARD TO SIT STILL: NOT AT ALL
2. NOT BEING ABLE TO STOP OR CONTROL WORRYING: NOT AT ALL
7. FEELING AFRAID AS IF SOMETHING AWFUL MIGHT HAPPEN: NOT AT ALL
6. BECOMING EASILY ANNOYED OR IRRITABLE: NOT AT ALL
GAD7 TOTAL SCORE: 0
3. WORRYING TOO MUCH ABOUT DIFFERENT THINGS: NOT AT ALL

## 2023-10-19 NOTE — TELEPHONE ENCOUNTER
Completed and signed form mailed for [employer].  to pt's home address - Copy to scanning

## 2023-10-19 NOTE — PROGRESS NOTES
Bess is a 47 year old who is being evaluated via a billable video visit.      How would you like to obtain your AVS? MyChart  If the video visit is dropped, the invitation should be resent by: Send to e-mail at: [email or web address]  Will anyone else be joining your video visit? No          Assessment & Plan     Multiple sclerosis (H)  Severe weakness    Chronic heart failure with preserved ejection fraction (H)  Continue diuretic  Follow up with cardiology    Lymphedema of both lower extremities  Does compression wraps regularly, no longer getting home OT    Moderate episode of recurrent major depressive disorder (H)  On Sertraline, seeing therapist regularly     Generalized anxiety disorder  As above    Patient seeing me today to complete disability paperwork.  She is homebound, has significant progressive MS, morbid obesity, secondary lymphedema, depression/anxiety  She's unable to work in any capacity at this time.   Paperwork completed      Mikala Luna MD  [facility]   Bess is a 47 year old, presenting for the following health issues:  No chief complaint on file.        10/19/2023    10:08 AM   Additional Questions   Roomed by Vero REAL CMA   Accompanied by Self       History of Present Illness       Reason for visit:  Doctor and patient discuss paperwork    She eats 0-1 servings of fruits and vegetables daily.She consumes 1 sweetened beverage(s) daily.She exercises with enough effort to increase her heart rate 9 or less minutes per day.  She exercises with enough effort to increase her heart rate 3 or less days per week.   She is taking medications regularly.     Long term disability    Depression and Anxiety Follow-Up  Zoloft 100mg qd  How are you doing with your depression since your last visit? No change  How are you doing with your anxiety since your last visit?  No change  Are you having other symptoms that might be associated with depression or anxiety? Yes:   sleeping more and trouble getting started on tasks  Have you had a significant life event? No   Do you have any concerns with your use of alcohol or other drugs? No    Social History     Tobacco Use    Smoking status: Never    Smokeless tobacco: Never   Vaping Use    Vaping Use: Never used   Substance Use Topics    Alcohol use: Yes     Comment: social    Drug use: No         4/15/2021     1:05 PM 4/5/2022     4:05 PM 9/27/2023     3:40 PM   PHQ   PHQ-9 Total Score 0 3 8   Q9: Thoughts of better off dead/self-harm past 2 weeks Not at all Not at all Not at all         4/15/2021     1:05 PM 4/5/2022     4:05 PM 9/27/2023     3:51 PM   TALIB-7 SCORE   Total Score 1 2 5         10/19/2023    10:11 AM   Last PHQ-9   1.  Little interest or pleasure in doing things 2   2.  Feeling down, depressed, or hopeless 1   3.  Trouble falling or staying asleep, or sleeping too much 3   4.  Feeling tired or having little energy 3   5.  Poor appetite or overeating 0   6.  Feeling bad about yourself 0   7.  Trouble concentrating 0   8.  Moving slowly or restless 0   Q9: Thoughts of better off dead/self-harm past 2 weeks 0   PHQ-9 Total Score 9         10/19/2023    10:11 AM   TALIB-7    1. Feeling nervous, anxious, or on edge 0   2. Not being able to stop or control worrying 0   3. Worrying too much about different things 0   4. Trouble relaxing 0   5. Being so restless that it is hard to sit still 0   6. Becoming easily annoyed or irritable 0   7. Feeling afraid, as if something awful might happen 0   TALIB-7 Total Score 0       Suicide Assessment Five-step Evaluation and Treatment (SAFE-T)          Review of Systems         Objective           Vitals:  No vitals were obtained today due to virtual visit.    Physical Exam   GENERAL: alert, no distress, and obese  EYES: Eyes grossly normal to inspection.  No discharge or erythema, or obvious scleral/conjunctival abnormalities.  RESP: No audible wheeze, cough, or visible cyanosis.  No visible  retractions or increased work of breathing.    SKIN: Visible skin clear. No significant rash, abnormal pigmentation or lesions.  PSYCH: mentation appears normal and affect flat                Video-Visit Details    Type of service:  Video Visit   Video Start Time:  145 pm  Video End Time: 205 pm    Originating Location (pt. Location): Home    Distant Location (provider location):  On-site  Platform used for Video Visit: [employer]

## 2023-11-22 NOTE — NURSING NOTE
DME orders have been automatically faxed to [facility]. 1 year appointment reminder will be sent via My Chart.   Kenia Mcneil CMA

## 2023-12-19 ENCOUNTER — E-VISIT (OUTPATIENT)
Dept: URGENT CARE | Facility: CLINIC | Age: 48
End: 2023-12-19
Payer: MEDICARE

## 2023-12-19 DIAGNOSIS — R30.0 DYSURIA: Primary | ICD-10-CM

## 2023-12-19 PROCEDURE — 99207 PR NON-BILLABLE SERV PER CHARTING: CPT | Performed by: NURSE PRACTITIONER

## 2023-12-20 NOTE — PATIENT INSTRUCTIONS
Dear Bess Enamorado,     After reviewing your responses, I would like you to come in for a urine test to make sure we treat you correctly. This urine test is to evaluate you for a possible urinary tract infection, and should be scheduled for today or tomorrow. Schedule a Lab Only appointment here.     Lab appointments are not available at most locations on the weekends. If no Lab Only appointment is available, you should be seen in any of our convenient Walk-in or Urgent Care Centers, which can be found on our website here.     You will receive instructions with your results in [employer] once they are available.     If your symptoms worsen, you develop pain in your back or stomach, develop fevers, or are not improving in 5 days, please contact your primary care provider for an appointment or visit a Walk-in or Urgent Care Center to be seen.     Thanks again for choosing us as your health care partner,     Suzanna Miguel NP

## 2023-12-21 ENCOUNTER — APPOINTMENT (OUTPATIENT)
Dept: CT IMAGING | Facility: HOSPITAL | Age: 48
DRG: 291 | End: 2023-12-21
Attending: EMERGENCY MEDICINE
Payer: MEDICARE

## 2023-12-21 ENCOUNTER — APPOINTMENT (OUTPATIENT)
Dept: RADIOLOGY | Facility: HOSPITAL | Age: 48
DRG: 291 | End: 2023-12-21
Attending: EMERGENCY MEDICINE
Payer: MEDICARE

## 2023-12-21 ENCOUNTER — HOSPITAL ENCOUNTER (INPATIENT)
Facility: HOSPITAL | Age: 48
LOS: 6 days | Discharge: HOME HEALTH | DRG: 291 | End: 2023-12-27
Attending: EMERGENCY MEDICINE | Admitting: INTERNAL MEDICINE
Payer: MEDICARE

## 2023-12-21 DIAGNOSIS — R60.0 LEG EDEMA: ICD-10-CM

## 2023-12-21 DIAGNOSIS — L03.119 CELLULITIS OF LOWER EXTREMITY, UNSPECIFIED LATERALITY: ICD-10-CM

## 2023-12-21 DIAGNOSIS — I50.9 ACUTE ON CHRONIC CONGESTIVE HEART FAILURE, UNSPECIFIED HEART FAILURE TYPE (H): ICD-10-CM

## 2023-12-21 DIAGNOSIS — J96.21 ACUTE ON CHRONIC RESPIRATORY FAILURE WITH HYPOXIA AND HYPERCAPNIA (H): Primary | ICD-10-CM

## 2023-12-21 DIAGNOSIS — N10 PYELONEPHRITIS, ACUTE: ICD-10-CM

## 2023-12-21 DIAGNOSIS — J96.22 ACUTE ON CHRONIC RESPIRATORY FAILURE WITH HYPOXIA AND HYPERCAPNIA (H): Primary | ICD-10-CM

## 2023-12-21 LAB
ALBUMIN UR-MCNC: 100 MG/DL
ANION GAP SERPL CALCULATED.3IONS-SCNC: 12 MMOL/L (ref 7–15)
APPEARANCE UR: ABNORMAL
BACTERIA #/AREA URNS HPF: ABNORMAL /HPF
BASOPHILS # BLD AUTO: 0.1 10E3/UL (ref 0–0.2)
BASOPHILS NFR BLD AUTO: 1 %
BILIRUB UR QL STRIP: NEGATIVE
BUN SERPL-MCNC: 16.6 MG/DL (ref 6–20)
CALCIUM SERPL-MCNC: 9.9 MG/DL (ref 8.6–10)
CHLORIDE SERPL-SCNC: 102 MMOL/L (ref 98–107)
COLOR UR AUTO: YELLOW
CREAT SERPL-MCNC: 0.89 MG/DL (ref 0.51–0.95)
DEPRECATED HCO3 PLAS-SCNC: 24 MMOL/L (ref 22–29)
EGFRCR SERPLBLD CKD-EPI 2021: 79 ML/MIN/1.73M2
EOSINOPHIL # BLD AUTO: 0.1 10E3/UL (ref 0–0.7)
EOSINOPHIL NFR BLD AUTO: 2 %
ERYTHROCYTE [DISTWIDTH] IN BLOOD BY AUTOMATED COUNT: 18.4 % (ref 10–15)
FLUAV RNA SPEC QL NAA+PROBE: NEGATIVE
FLUBV RNA RESP QL NAA+PROBE: NEGATIVE
GLUCOSE SERPL-MCNC: 94 MG/DL (ref 70–99)
GLUCOSE UR STRIP-MCNC: NEGATIVE MG/DL
HCT VFR BLD AUTO: 46.6 % (ref 35–47)
HGB BLD-MCNC: 14.4 G/DL (ref 11.7–15.7)
HGB UR QL STRIP: ABNORMAL
IMM GRANULOCYTES # BLD: 0 10E3/UL
IMM GRANULOCYTES NFR BLD: 0 %
KETONES UR STRIP-MCNC: NEGATIVE MG/DL
LACTATE SERPL-SCNC: 1.7 MMOL/L (ref 0.7–2)
LEUKOCYTE ESTERASE UR QL STRIP: ABNORMAL
LYMPHOCYTES # BLD AUTO: 0.9 10E3/UL (ref 0.8–5.3)
LYMPHOCYTES NFR BLD AUTO: 15 %
MCH RBC QN AUTO: 26.2 PG (ref 26.5–33)
MCHC RBC AUTO-ENTMCNC: 30.9 G/DL (ref 31.5–36.5)
MCV RBC AUTO: 85 FL (ref 78–100)
MONOCYTES # BLD AUTO: 0.7 10E3/UL (ref 0–1.3)
MONOCYTES NFR BLD AUTO: 12 %
NEUTROPHILS # BLD AUTO: 4.1 10E3/UL (ref 1.6–8.3)
NEUTROPHILS NFR BLD AUTO: 70 %
NITRATE UR QL: POSITIVE
NRBC # BLD AUTO: 0 10E3/UL
NRBC BLD AUTO-RTO: 0 /100
NT-PROBNP SERPL-MCNC: 2712 PG/ML (ref 0–450)
PH UR STRIP: 6 [PH] (ref 5–7)
PLATELET # BLD AUTO: 188 10E3/UL (ref 150–450)
POTASSIUM SERPL-SCNC: 4.1 MMOL/L (ref 3.4–5.3)
RBC # BLD AUTO: 5.5 10E6/UL (ref 3.8–5.2)
RBC URINE: 31 /HPF
RSV RNA SPEC NAA+PROBE: NEGATIVE
SARS-COV-2 RNA RESP QL NAA+PROBE: NEGATIVE
SODIUM SERPL-SCNC: 138 MMOL/L (ref 135–145)
SP GR UR STRIP: 1.01 (ref 1–1.03)
SQUAMOUS EPITHELIAL: 1 /HPF
TROPONIN T SERPL HS-MCNC: 23 NG/L
UROBILINOGEN UR STRIP-MCNC: 4 MG/DL
WBC # BLD AUTO: 5.8 10E3/UL (ref 4–11)
WBC CLUMPS #/AREA URNS HPF: PRESENT /HPF
WBC URINE: >182 /HPF

## 2023-12-21 PROCEDURE — 250N000011 HC RX IP 250 OP 636: Mod: JZ | Performed by: INTERNAL MEDICINE

## 2023-12-21 PROCEDURE — 93005 ELECTROCARDIOGRAM TRACING: CPT | Performed by: EMERGENCY MEDICINE

## 2023-12-21 PROCEDURE — 80048 BASIC METABOLIC PNL TOTAL CA: CPT | Performed by: EMERGENCY MEDICINE

## 2023-12-21 PROCEDURE — 71046 X-RAY EXAM CHEST 2 VIEWS: CPT

## 2023-12-21 PROCEDURE — 999N000157 HC STATISTIC RCP TIME EA 10 MIN

## 2023-12-21 PROCEDURE — G1010 CDSM STANSON: HCPCS

## 2023-12-21 PROCEDURE — 250N000011 HC RX IP 250 OP 636: Mod: JZ | Performed by: EMERGENCY MEDICINE

## 2023-12-21 PROCEDURE — 250N000013 HC RX MED GY IP 250 OP 250 PS 637: Performed by: INTERNAL MEDICINE

## 2023-12-21 PROCEDURE — 87186 SC STD MICRODIL/AGAR DIL: CPT | Performed by: EMERGENCY MEDICINE

## 2023-12-21 PROCEDURE — 99285 EMERGENCY DEPT VISIT HI MDM: CPT | Mod: 25

## 2023-12-21 PROCEDURE — 81001 URINALYSIS AUTO W/SCOPE: CPT | Performed by: EMERGENCY MEDICINE

## 2023-12-21 PROCEDURE — 210N000001 HC R&B IMCU HEART CARE

## 2023-12-21 PROCEDURE — 83605 ASSAY OF LACTIC ACID: CPT | Performed by: EMERGENCY MEDICINE

## 2023-12-21 PROCEDURE — 96365 THER/PROPH/DIAG IV INF INIT: CPT

## 2023-12-21 PROCEDURE — 96375 TX/PRO/DX INJ NEW DRUG ADDON: CPT

## 2023-12-21 PROCEDURE — 84484 ASSAY OF TROPONIN QUANT: CPT | Performed by: EMERGENCY MEDICINE

## 2023-12-21 PROCEDURE — 99223 1ST HOSP IP/OBS HIGH 75: CPT | Mod: AI | Performed by: INTERNAL MEDICINE

## 2023-12-21 PROCEDURE — 85025 COMPLETE CBC W/AUTO DIFF WBC: CPT | Performed by: EMERGENCY MEDICINE

## 2023-12-21 PROCEDURE — 87086 URINE CULTURE/COLONY COUNT: CPT | Performed by: EMERGENCY MEDICINE

## 2023-12-21 PROCEDURE — 83880 ASSAY OF NATRIURETIC PEPTIDE: CPT | Performed by: EMERGENCY MEDICINE

## 2023-12-21 PROCEDURE — 87637 SARSCOV2&INF A&B&RSV AMP PRB: CPT | Performed by: EMERGENCY MEDICINE

## 2023-12-21 PROCEDURE — 36415 COLL VENOUS BLD VENIPUNCTURE: CPT | Performed by: EMERGENCY MEDICINE

## 2023-12-21 PROCEDURE — 87040 BLOOD CULTURE FOR BACTERIA: CPT | Performed by: EMERGENCY MEDICINE

## 2023-12-21 PROCEDURE — 87640 STAPH A DNA AMP PROBE: CPT | Performed by: INTERNAL MEDICINE

## 2023-12-21 RX ORDER — ENOXAPARIN SODIUM 100 MG/ML
40 INJECTION SUBCUTANEOUS EVERY 12 HOURS SCHEDULED
Status: DISCONTINUED | OUTPATIENT
Start: 2023-12-21 | End: 2023-12-27 | Stop reason: HOSPADM

## 2023-12-21 RX ORDER — SERTRALINE HYDROCHLORIDE 100 MG/1
100 TABLET, FILM COATED ORAL DAILY
Status: DISCONTINUED | OUTPATIENT
Start: 2023-12-21 | End: 2023-12-27 | Stop reason: HOSPADM

## 2023-12-21 RX ORDER — LIDOCAINE 40 MG/G
CREAM TOPICAL
Status: DISCONTINUED | OUTPATIENT
Start: 2023-12-21 | End: 2023-12-27 | Stop reason: HOSPADM

## 2023-12-21 RX ORDER — FUROSEMIDE 10 MG/ML
20 INJECTION INTRAMUSCULAR; INTRAVENOUS ONCE
Status: COMPLETED | OUTPATIENT
Start: 2023-12-21 | End: 2023-12-21

## 2023-12-21 RX ORDER — SENNOSIDES 8.6 MG
650 CAPSULE ORAL EVERY 8 HOURS PRN
COMMUNITY

## 2023-12-21 RX ORDER — CEFTRIAXONE 1 G/1
1 INJECTION, POWDER, FOR SOLUTION INTRAMUSCULAR; INTRAVENOUS EVERY 24 HOURS
Status: DISCONTINUED | OUTPATIENT
Start: 2023-12-22 | End: 2023-12-25

## 2023-12-21 RX ORDER — AMOXICILLIN 250 MG
1 CAPSULE ORAL 2 TIMES DAILY PRN
Status: DISCONTINUED | OUTPATIENT
Start: 2023-12-21 | End: 2023-12-27 | Stop reason: HOSPADM

## 2023-12-21 RX ORDER — CEFTRIAXONE 1 G/1
1 INJECTION, POWDER, FOR SOLUTION INTRAMUSCULAR; INTRAVENOUS ONCE
Status: COMPLETED | OUTPATIENT
Start: 2023-12-21 | End: 2023-12-21

## 2023-12-21 RX ORDER — METOPROLOL SUCCINATE 25 MG/1
25 TABLET, EXTENDED RELEASE ORAL DAILY
Status: DISCONTINUED | OUTPATIENT
Start: 2023-12-21 | End: 2023-12-27 | Stop reason: HOSPADM

## 2023-12-21 RX ORDER — AMOXICILLIN 250 MG
2 CAPSULE ORAL 2 TIMES DAILY PRN
Status: DISCONTINUED | OUTPATIENT
Start: 2023-12-21 | End: 2023-12-27 | Stop reason: HOSPADM

## 2023-12-21 RX ORDER — CALCIUM CARBONATE 500 MG/1
1000 TABLET, CHEWABLE ORAL 4 TIMES DAILY PRN
Status: DISCONTINUED | OUTPATIENT
Start: 2023-12-21 | End: 2023-12-27 | Stop reason: HOSPADM

## 2023-12-21 RX ORDER — FUROSEMIDE 10 MG/ML
40 INJECTION INTRAMUSCULAR; INTRAVENOUS
Status: DISCONTINUED | OUTPATIENT
Start: 2023-12-21 | End: 2023-12-25

## 2023-12-21 RX ADMIN — ENOXAPARIN SODIUM 40 MG: 40 INJECTION SUBCUTANEOUS at 21:18

## 2023-12-21 RX ADMIN — SERTRALINE HYDROCHLORIDE 100 MG: 100 TABLET ORAL at 18:28

## 2023-12-21 RX ADMIN — FUROSEMIDE 40 MG: 10 INJECTION, SOLUTION INTRAMUSCULAR; INTRAVENOUS at 18:28

## 2023-12-21 RX ADMIN — METOPROLOL SUCCINATE 25 MG: 25 TABLET, EXTENDED RELEASE ORAL at 18:28

## 2023-12-21 RX ADMIN — FUROSEMIDE 20 MG: 10 INJECTION, SOLUTION INTRAMUSCULAR; INTRAVENOUS at 15:46

## 2023-12-21 RX ADMIN — CEFTRIAXONE SODIUM 1 G: 1 INJECTION, POWDER, FOR SOLUTION INTRAMUSCULAR; INTRAVENOUS at 15:38

## 2023-12-21 ASSESSMENT — ACTIVITIES OF DAILY LIVING (ADL)
ADLS_ACUITY_SCORE: 37
ADLS_ACUITY_SCORE: 35
ADLS_ACUITY_SCORE: 35

## 2023-12-21 NOTE — ED NOTES
"[facility] ED Handoff Report    ED Chief Complaint: right sided flank pain, weaknes, and shortness of breath    ED Diagnosis:  (I50.9) Acute on chronic congestive heart failure, unspecified heart failure type (H)  Comment: none  Plan: lasix    (L03.119) Cellulitis of lower extremity, unspecified laterality  Comment: none   Plan: Rocephin given    (N10) Pyelonephritis, acute  Comment: none  Plan: antibiotics        PMH:    Past Medical History:   Diagnosis Date    Acute on chronic diastolic congestive heart failure (H) 02/09/2022    Arthritis 2019    Hips    ASCUS favor benign 08/2015    Neg HPV    Depressive disorder 2010    H/O colposcopy with cervical biopsy 09/14/2015    Bx & ECC - negative    Hypertension 2019    LSIL on Pap smear 07/2014    Neg high risk HPV    Multiple sclerosis (H) 08/29/2005 9/18/200pt dx with MS 2004--dx by neurolgist and is followed by Dr. Harris    Myocardial infarction (H)     Obese     Pneumonia due to infectious organism, unspecified laterality, unspecified part of lung 02/08/2022    Shingles 10/2016    SIRS (systemic inflammatory response syndrome) (H) 03/04/2021    Sleep apnea     UNSPEC CONSTIPATION 09/18/2006 9/18/2006   Has tried otc suppository and otc lax.         Code Status:  Prior     Falls Risk: Yes Band: Applied    Current Living Situation/Residence: lives alone     Elimination Status: Continent: No     Activity Level: SBA w/ walker    Patients Preferred Language:  English     Needed: No    Vital Signs:  /85   Pulse 100   Temp 98.3  F (36.8  C) (Oral)   Resp 18   Ht 1.626 m (5' 4\")   LMP 12/07/2023   SpO2 99%   BMI 58.19 kg/m       Cardiac Rhythm: NSR     Pain Score: 0/10    Is the Patient Confused:  No    Last Food or Drink: 12/21/23 at prior to arrival    Focused Assessment:  right sided flank pain.      Tests Performed: Done: Labs and Imaging    Treatments Provided:  antibiotics      Family Dynamics/Concerns: No    Family Updated " On Visitor Policy: No    Plan of Care Communicated to Family: No    Who Was Updated about Plan of Care: patient to update family    Belongings Checklist Done and Signed by Patient: Yes    Medications sent with patient: none     Covid: asymptomatic , not done     Additional Information: Patient has pressure wrapping to lower extremities     RN: Adry Smallwood RN  2/21/2023 4:45 PM

## 2023-12-21 NOTE — H&P
[facility]    History and Physical - Hospitalist Service       Date of Admission:  12/21/2023    Assessment & Plan      Bess Enamorado is a 48 year old female admitted on 12/21/2023 for CHF exacerbation and acute pyelonephritis.    HFpEF exacerbation, acute hypoxemic respiratory failure:  Presented with shortness of breath due to diuretic noncompliance. PTA bumex 3 mg daily.  Checks x-ray findings are consistent with pulmonary edema.  Echocardiogram 5/13/22 showed normal LVEF without significant valvular heart disease   - Lasix 40 mg bid  - Repeat echocardiogram  - Continue PTA metoprolol  - Intake and output  - Daily weight  - Monitor electrolytes and creatinine while on diuresis  - Needed 3-4 LPM supplemental oxygen on admission. Wean as tolerated.     Acute right pyelonephritis:  Presents with urinary urgency and right flank pain for 4 days.  CT scan findings suggestive of ascending UTI. No urolithiasis or hydronephrosis.   - Started ceftriaxone in ER.  Continue.  Follow-up urine culture result.    Bilateral lower extremity cellulitis:  Patient notices that her bilateral inner thigh have become more erythematous recently.  - Continue Ceftriaxone as above. Do MRSA screen.    Mood disorder: Continue PTA Zoloft        Diet:  low salt low fat diet  DVT Prophylaxis: Enoxaparin (Lovenox) SQ  Brar Catheter: Not present  Lines: None     Cardiac Monitoring: None  Code Status:  Full code    Clinically Significant Risk Factors Present on Admission                  # Hypertension: Noted on problem list  # Acute heart failure with preserved ejection fraction: heart failure noted on problem list, last echo with EF >50%, and receiving IV diuretics                Disposition Plan      Expected Discharge Date: 12/23/2023                  Laxmi Madison MD  Hospitalist Service  [facility]  Securely message with [employer] (more info)  Text page via [employer] Paging/Directory      ______________________________________________________________________    Chief Complaint   Right flank pain and SOB    History is obtained from the patient    History of Present Illness   Bess Enamorado is a 48 year old female with a medical history of  CHF, CAD, morbid obesity, HTN, kidney stones, and multiple sclerosis, who presented to ER with right flank pain and shortness of breath.  Patient reports that 4 days ago, she developed urinary urgency and frequency, as well as right flank pain. Two days ago, she had a tele visit with urgent care and recommended to have urine tested for UTI.  Due to her urinary symptoms, she stopped taking bumex. She developed shortness of breath when she walks with her walker. She noticed that her legs have increased swelling. She came to ER for evaluation of her urinary tract infection today. She was noted to have hypoxia. CT scan of the abdomen concerns acute pyelonephritis. Patient was also admitted for CHF exacerbation.      Past Medical History    Past Medical History:   Diagnosis Date    Acute on chronic diastolic congestive heart failure (H) 02/09/2022    Arthritis 2019    Hips    ASCUS favor benign 08/2015    Neg HPV    Depressive disorder 2010    H/O colposcopy with cervical biopsy 09/14/2015    Bx & ECC - negative    Hypertension 2019    LSIL on Pap smear 07/2014    Neg high risk HPV    Multiple sclerosis (H) 08/29/2005 9/18/200pt dx with MS 2004--dx by neurolgist and is followed by Dr. Harris    Myocardial infarction (H)     Obese     Pneumonia due to infectious organism, unspecified laterality, unspecified part of lung 02/08/2022    Shingles 10/2016    SIRS (systemic inflammatory response syndrome) (H) 03/04/2021    Sleep apnea     UNSPEC CONSTIPATION 09/18/2006 9/18/2006   Has tried otc suppository and otc lax.        Past Surgical History   Past Surgical History:   Procedure Laterality Date    CHOLECYSTECTOMY, LAPOROSCOPIC      Cholecystectomy, Laparoscopic     COLONOSCOPY  2007?    Bathroom issue..results normal    COMBINED CYSTOSCOPY, RETROGRADES, EXCHANGE STENT URETER(S) Right 2/21/2022    Procedure: CYSTOSCOPY, WITH right RETROGRADE PYELOGRAM AND right URETERAL STENT PLACEMENT;  Surgeon: Doyle Palomares MD;  Location: [facility] OR    OPEN REDUCTION INTERNAL FIXATION TOE(S)  4/13/2012    Procedure:OPEN REDUCTION INTERNAL FIXATION TOE(S); Open reduction internal fixation right proximal fifth metatarsal fracture-   Anes-choice block; Surgeon:LEY, JEFFREY DUANE; Location:[place] OR    PICC TRIPLE LUMEN PLACEMENT  2/21/2022            Prior to Admission Medications   Prior to Admission Medications   Prescriptions Last Dose Informant Patient Reported? Taking?   ACE/ARB/ARNI NOT PRESCRIBED (INTENTIONAL)   No No   Sig: Please choose reason not prescribed from choices below.   ASPIRIN NOT PRESCRIBED (INTENTIONAL)   No No   Sig: Please choose reason not prescribed from choices below.   acetaminophen (TYLENOL) 650 MG CR tablet 12/20/2023 at pm  Yes Yes   Sig: Take 1,300 mg by mouth once as needed for mild pain or fever   bumetanide (BUMEX) 1 MG tablet Past Week at afternoon  No Yes   Sig: Take 3 tablets (3 mg) by mouth daily   metoprolol succinate ER (TOPROL XL) 25 MG 24 hr tablet Past Week at afternoon  No Yes   Sig: Take 1 tablet (25 mg) by mouth daily   miconazole (MICATIN) 2 % AERP powder Past Week at prn  Yes Yes   Sig: Apply topically 2 times daily as needed   sertraline (ZOLOFT) 100 MG tablet Past Week at afternoon  No Yes   Sig: Take 1 tablet (100 mg) by mouth daily      Facility-Administered Medications: None        Review of Systems    The 10 point Review of Systems is negative other than noted in the HPI or here.      Physical Exam   Vital Signs: Temp: 98.3  F (36.8  C) Temp src: Oral BP: 137/80 Pulse: 106   Resp: 18 SpO2: 92 % O2 Device: Nasal cannula (3L)    Weight: 0 lbs 0 oz    General appearance: not in acute distress  HEENT: PERRL, EOMI  Lungs: Clear breath  sounds in bilateral lung fields  Cardiovascular: Regular rate and rhythm, normal S1-S2  Abdomen: Soft, non tender, no distension. Right flank tenderness to percussion  Musculoskeletal: No joint swelling  Skin: Bilateral lower leg edema  Neurology: AAO ×3.  Cranial nerves II - XII normal.  Normal muscle strength in all four extremities.     Medical Decision Making       65 MINUTES SPENT BY ME on the date of service doing chart review, history, exam, documentation & further activities per the note.      Data     I have personally reviewed the following data over the past 24 hrs:    5.8  \   14.4   / 188     138 102 16.6 /  94   4.1 24 0.89 \     Trop: 23 (H) BNP: 2,712 (H)     Procal: N/A CRP: N/A Lactic Acid: 1.7         Imaging results reviewed over the past 24 hrs:   Recent Results (from the past 24 hour(s))   Chest XR,  PA & LAT    Narrative    EXAM: XR CHEST 2 VIEWS  LOCATION: [facility]  DATE: 12/21/2023    INDICATION: Dyspnea  COMPARISON: Chest radiograph 05/02/2022, 03/23/2022      Impression    IMPRESSION: Lung volumes are low with patchy bilateral and bibasilar opacities, favored to represent edema and/or atelectasis, less likely infection. No pleural effusion or pneumothorax. Unchanged cardiomegaly and prominence of the pulmonary   vasculature/candace.   Abd/pelvis CT no contrast - Stone Protocol    Narrative    EXAM: CT ABDOMEN PELVIS W/O CONTRAST  LOCATION: [facility]  DATE: 12/21/2023    INDICATION: Right flank pain  COMPARISON: CT abdomen pelvis 04/06/2022, 02/21/2022  TECHNIQUE: CT scan of the abdomen and pelvis was performed without IV contrast. Multiplanar reformats were obtained. Dose reduction techniques were used.  CONTRAST: None.    FINDINGS:   LOWER CHEST: No worrisome lung nodules or consolidation in the visualized lung bases. No pleural or pericardial effusion. Mild cardiomegaly with mitral annular calcifications.    HEPATOBILIARY: Hepatic  steatosis. Cholecystectomy. No gross biliary ductal dilation.    PANCREAS: Normal.    SPLEEN: Normal.    ADRENAL GLANDS: Normal.    KIDNEYS/BLADDER: Normal unenhanced renal contours. No hydronephrosis, though there is periureteral and pericystic stranding with bladder wall thickening.    BOWEL: No bowel obstruction. Normal appendix. No evidence of diverticulitis. No ascites.    LYMPH NODES: Prominent bilateral iliac and retroperitoneal lymph nodes, likely reactive.    VASCULATURE: Unremarkable.    PELVIC ORGANS: Uterus is present. Unchanged 9 cm benign left ovarian dermoid (which requires no follow-up).    MUSCULOSKELETAL: No aggressive osseous lesions. Diffuse body wall edema.      Impression    IMPRESSION:   1.  Pericystic and bilateral ureteral stranding as well as bladder wall thickening, suspicious for cystitis/ascending urinary tract infection. No urolithiasis or hydronephrosis.  2.  Prominent bilateral iliac and retroperitoneal lymph nodes, which are favored to be reactive.  3.  Hepatic steatosis.

## 2023-12-21 NOTE — ED NOTES
Bed: [facility]  Expected date:   Expected time:   Means of arrival: Ambulance  Comments:  [facility]: flank pain, hypoxia

## 2023-12-21 NOTE — PHARMACY-ADMISSION MEDICATION HISTORY
Pharmacist Admission Medication History    Admission medication history is complete. The information provided in this note is only as accurate as the sources available at the time of the update.    Information Source(s): Patient via in-person    Pertinent Information: Patient last took her home medications a couple of days ago.    Changes made to PTA medication list:  Added: None  Deleted: Vitamin D, Apri, Gabapentin, Potassium  Changed: Acetaminophen 325 mg 2 tablets changed to 650 mg 2 tablets once prn .     Medication Affordability:       Allergies reviewed with patient and updates made in EHR: yes    Medication History Completed By: COMFORT GOMEZ RPH 12/21/2023 3:08 PM    PTA Med List   Medication Sig Last Dose    acetaminophen (TYLENOL) 650 MG CR tablet Take 1,300 mg by mouth once as needed for mild pain or fever 12/20/2023 at pm    bumetanide (BUMEX) 1 MG tablet Take 3 tablets (3 mg) by mouth daily Past Week at afternoon    metoprolol succinate ER (TOPROL XL) 25 MG 24 hr tablet Take 1 tablet (25 mg) by mouth daily Past Week at afternoon    miconazole (MICATIN) 2 % AERP powder Apply topically 2 times daily as needed Past Week at prn    sertraline (ZOLOFT) 100 MG tablet Take 1 tablet (100 mg) by mouth daily Past Week at afternoon

## 2023-12-21 NOTE — ED PROVIDER NOTES
EMERGENCY DEPARTMENT ENCOUNTER      NAME: Bess Enamorado  AGE: 48 year old female  YOB: 1975  MRN: [id number]  EVALUATION DATE & TIME: 2023  1:04 PM    PCP: Mikala Luna    ED PROVIDER: Ravin Guillen D.O.      Chief Complaint   Patient presents with    Flank Pain    Shortness of Breath       FINAL IMPRESSION:  1. Acute on chronic congestive heart failure, unspecified heart failure type (H)    2. Cellulitis of lower extremity, unspecified laterality    3. Pyelonephritis, acute        ED COURSE & MEDICAL DECISION MAKIN:12 PM I met with the patient to gather history and to perform my initial exam. I discussed the plan for care while in the Emergency Department.  3:56 PM  spoke with Dr. Madison, Hospitalist, for admission         Pertinent Labs & Imaging studies reviewed. (See chart for details)  48 year old female presents to the Emergency Department for evaluation of right-sided flank pain with known history of CHF and complaint of shortness of breath.  Patient does report she has not been taking her Lasix as she is supposed to.  She also reports increasing swelling, and concerned that she has been retaining more fluid.  UA does show evidence of infection, and CT shows evidence that would be concerning for ascending infection, with symptoms concerning for pyelonephritis.  She is tachycardic, but otherwise not showing definitive signs of sepsis.  Additionally she does have cellulitis of her lower extremities with the edema that has been worsening, will cover both these infections with Rocephin.  She is also showing signs of CHF exacerbation with elevation in her BNP, but no evidence of ischemia or arrhythmia on her EKG.  At this time, plan is for admission for diuresis, and IV antibiotics.    Medical Decision Making    History:  Supplemental history from: Documented in chart, if applicable  External Record(s) reviewed: Documented in chart, if applicable.    Work Up:  Chart documentation includes  differential considered and any EKGs or imaging independently interpreted by provider, where specified.  In additional to work up documented, I considered the following work up: Documented in chart, if applicable.    External consultation:  Discussion of management with another provider: Documented in chart, if applicable    Complicating factors:  Care impacted by chronic illness: Heart Disease  Care affected by social determinants of health: N/A    Disposition considerations: Admit.        At the conclusion of the encounter I discussed the results of all of the tests and the disposition. The questions were answered. The patient or family acknowledged understanding and was agreeable with the care plan.        HPI    Patient information was obtained from: Patient    Use of : N/A         Bess Enamorado is a 48 year old female with a history of CHF, MI, morbid obesity, HTN, NSTEMI, acute renal failure, kidney stones, and multiple sclerosis, who presents flank pain and shortness of breath.    Per Chart Review: Patient had an E-visit with Urgent Care on 12/19/2023 with concern of UTI. They recommended she present for a urine test.    Per nurse, patient has had right flank pain since Monday. O2 stats were lower 80s with EMS, now on 3L nasal cannula.     Patient reports her shortness of breath has been on and off, with a cough. She thinks she has been retaining a lot of water, noting she takes lasix once a day for CHF, but has not taken it the last two days. She reports she's been having bad urinary frequency and dark urine. She has increased leg swelling. No pain with urination or hematuria. No fevers, nausea, vomiting, diarrhea, lightheadedness or chest pain. She notes loose stools. Patient reports a history of acute renal failure about 1.5 years ago due to diuretics. She has a history of kidney stones. No other current complaints.    REVIEW OF SYSTEMS  Constitutional:  Denies fever, chills, weight loss or  weakness   Eyes:  No pain, discharge, redness  HENT:  Denies sore throat, ear pain, congestion  Respiratory: No wheeze. Positive for cough and shortness of breath.  Cardiovascular:  No CP, palpitations. Positive for bilateral leg swelling.   GI:  Denies abdominal pain, nausea, vomiting, diarrhea  : Denies dysuria, hematuria. Positive for flank pain, frequency and dark urine.  Musculoskeletal:  Denies any new muscle/joint pain, swelling or loss of function.  Skin:  Denies rash, pallor  Neurologic:  Denies headache, focal weakness or sensory changes  Lymph: Denies swollen nodes    All other systems negative unless noted in HPI.    PAST MEDICAL HISTORY:  Past Medical History:   Diagnosis Date    Acute on chronic diastolic congestive heart failure (H) 02/09/2022    Arthritis 2019    Hips    ASCUS favor benign 08/2015    Neg HPV    Depressive disorder 2010    H/O colposcopy with cervical biopsy 09/14/2015    Bx & ECC - negative    Hypertension 2019    LSIL on Pap smear 07/2014    Neg high risk HPV    Multiple sclerosis (H) 08/29/2005 9/18/200pt dx with MS 2004--dx by neurolgist and is followed by Dr. Harris    Myocardial infarction (H)     Obese     Pneumonia due to infectious organism, unspecified laterality, unspecified part of lung 02/08/2022    Shingles 10/2016    SIRS (systemic inflammatory response syndrome) (H) 03/04/2021    Sleep apnea     UNSPEC CONSTIPATION 09/18/2006 9/18/2006   Has tried otc suppository and otc lax.        PAST SURGICAL HISTORY:  Past Surgical History:   Procedure Laterality Date    CHOLECYSTECTOMY, LAPOROSCOPIC      Cholecystectomy, Laparoscopic    COLONOSCOPY  2007?    Bathroom issue..results normal    COMBINED CYSTOSCOPY, RETROGRADES, EXCHANGE STENT URETER(S) Right 2/21/2022    Procedure: CYSTOSCOPY, WITH right RETROGRADE PYELOGRAM AND right URETERAL STENT PLACEMENT;  Surgeon: Doyle Palomares MD;  Location: [facility] OR    OPEN REDUCTION INTERNAL FIXATION TOE(S)  4/13/2012     Procedure:OPEN REDUCTION INTERNAL FIXATION TOE(S); Open reduction internal fixation right proximal fifth metatarsal fracture-   Anes-choice block; Surgeon:LEY, JEFFREY DUANE; Location:[facility] OR    PICC TRIPLE LUMEN PLACEMENT  2/21/2022              CURRENT MEDICATIONS:    Current Facility-Administered Medications   Medication    cefTRIAXone (ROCEPHIN) 1 g vial to attach to  mL bag for ADULTS or NS 50 mL bag for PEDS    furosemide (LASIX) injection 20 mg     Current Outpatient Medications   Medication    acetaminophen (TYLENOL) 650 MG CR tablet    bumetanide (BUMEX) 1 MG tablet    metoprolol succinate ER (TOPROL XL) 25 MG 24 hr tablet    miconazole (MICATIN) 2 % AERP powder    sertraline (ZOLOFT) 100 MG tablet    ACE/ARB/ARNI NOT PRESCRIBED (INTENTIONAL)    ASPIRIN NOT PRESCRIBED (INTENTIONAL)         ALLERGIES:  Allergies   Allergen Reactions    Nkda [No Known Drug Allergy]        FAMILY HISTORY:  Family History   Problem Relation Age of Onset    Neurologic Disorder Father         MS    Heart Disease Father         irregular heart rate    Diabetes Father     Melanoma Father     Sleep Apnea Father     Other Cancer Father     Cancer Maternal Grandfather         lung    Other Cancer Maternal Grandfather     Cancer Paternal Grandmother         stomach    Other Cancer Paternal Grandmother     Cancer Mother     Other Cancer Mother     Thyroid Disease Mother     Gynecology Maternal Aunt         PCOS    Hypertension Maternal Grandmother     Anxiety Disorder Maternal Grandmother     Thyroid Disease Maternal Grandmother     Anxiety Disorder Sister        SOCIAL HISTORY:  Social History     Socioeconomic History    Marital status: Single   Occupational History     Employer: [employer]   Tobacco Use    Smoking status: Never    Smokeless tobacco: Never   Vaping Use    Vaping Use: Never used   Substance and Sexual Activity    Alcohol use: Yes     Comment: social    Drug use: No    Sexual activity: Not Currently      Partners: Male     Birth control/protection: Pill   Other Topics Concern    Parent/sibling w/ CABG, MI or angioplasty before 65F 55M? No   Social History Narrative    , 3rd-5th grade     Social Determinants of Health     Financial Resource Strain: Low Risk  (9/25/2023)    Financial Resource Strain     Within the past 12 months, have you or your family members you live with been unable to get utilities (heat, electricity) when it was really needed?: No   Food Insecurity: Low Risk  (9/25/2023)    Food Insecurity     Within the past 12 months, did you worry that your food would run out before you got money to buy more?: No     Within the past 12 months, did the food you bought just not last and you didn t have money to get more?: No   Transportation Needs: High Risk (9/25/2023)    Transportation Needs     Within the past 12 months, has lack of transportation kept you from medical appointments, getting your medicines, non-medical meetings or appointments, work, or from getting things that you need?: Yes   Physical Activity: Inactive (3/1/2022)    Exercise Vital Sign     Days of Exercise per Week: 0 days     Minutes of Exercise per Session: 0 min   Stress: No Stress Concern Present (12/4/2020)    [employer] [employer] - Occupational Stress Questionnaire     Feeling of Stress : Only a little   Social Connections: Unknown (3/1/2022)    Social Connection and Isolation Panel [NHANES]     Frequency of Communication with Friends and Family: Twice a week     Frequency of Social Gatherings with Friends and Family: Twice a week   Interpersonal Safety: Low Risk  (9/27/2023)    Interpersonal Safety     Do you feel physically and emotionally safe where you currently live?: Yes     Within the past 12 months, have you been hit, slapped, kicked or otherwise physically hurt by someone?: No     Within the past 12 months, have you been humiliated or emotionally abused in other ways by your  "partner or ex-partner?: No   Housing Stability: Low Risk  (9/25/2023)    Housing Stability     Do you have housing? : Yes     Are you worried about losing your housing?: No       VITALS:  Patient Vitals for the past 24 hrs:   BP Temp Temp src Pulse Resp SpO2 Height   12/21/23 1309 99/73 98.3  F (36.8  C) Oral 101 18 92 % 1.626 m (5' 4\")       PHYSICAL EXAM    VITAL SIGNS: BP 99/73   Pulse 101   Temp 98.3  F (36.8  C) (Oral)   Resp 18   Ht 1.626 m (5' 4\")   LMP 12/07/2023   SpO2 92%   BMI 58.19 kg/m      General Appearance: Well-appearing, well-nourished, no acute distress   Head:  Normocephalic, without obvious abnormality, atraumatic  Eyes:  PERRL, conjunctiva/corneas clear, EOM's intact,  ENT:  Lips, mucosa, and tongue normal, membranes are moist without pallor  Neck:  Normal ROM, symmetrical, trachea midline    Cardio:  Regular rate and rhythm, no murmur, rub or gallop, 2+ pulses symmetric in all extremities  Pulm:  Diminished breath sounds in right base.  Back:  ROM normal, no CVA tenderness, no spinal tenderness, no paraspinal tenderness  Abdomen:  Soft, non-tender, no rebound or guarding.  Musculoskeletal: Full ROM, no cyanosis, good ROM of major joints. 4+ edema to BLE.  Integument:  Warm, Dry, No erythema, No rash.    Neurologic:  Alert & oriented.  No focal deficits appreciated.  Ambulatory.  Psychiatric:  Affect normal, Judgment normal, Mood normal.      LABS  Results for orders placed or performed during the hospital encounter of 12/21/23 (from the past 24 hour(s))   CBC with platelets + differential    Narrative    The following orders were created for panel order CBC with platelets + differential.  Procedure                               Abnormality         Status                     ---------                               -----------         ------                     CBC with platelets and d...[[id number]]  Abnormal            Final result                 Please view results for these tests on " the individual orders.   Troponin T, High Sensitivity (now)   Result Value Ref Range    Troponin T, High Sensitivity 23 (H) <=14 ng/L   Basic metabolic panel   Result Value Ref Range    Sodium 138 135 - 145 mmol/L    Potassium 4.1 3.4 - 5.3 mmol/L    Chloride 102 98 - 107 mmol/L    Carbon Dioxide (CO2) 24 22 - 29 mmol/L    Anion Gap 12 7 - 15 mmol/L    Urea Nitrogen 16.6 6.0 - 20.0 mg/dL    Creatinine 0.89 0.51 - 0.95 mg/dL    GFR Estimate 79 >60 mL/min/1.73m2    Calcium 9.9 8.6 - 10.0 mg/dL    Glucose 94 70 - 99 mg/dL   N terminal pro BNP outpatient   Result Value Ref Range    N Terminal Pro BNP Outpatient 2,712 (H) 0 - 450 pg/mL   Symptomatic Influenza A/B, RSV, & SARS-CoV2 PCR (COVID-19) Nasopharyngeal    Specimen: Nasopharyngeal; Swab   Result Value Ref Range    Influenza A PCR Negative Negative    Influenza B PCR Negative Negative    RSV PCR Negative Negative    SARS CoV2 PCR Negative Negative    Narrative    Testing was performed using the Xpert Xpress CoV2/Flu/RSV Assay on the Cepheid GeneXpert Instrument. This test should be ordered for the detection of SARS-CoV-2, influenza, and RSV viruses in individuals who meet clinical and/or epidemiological criteria. Test performance is unknown in asymptomatic patients. This test is for in vitro diagnostic use under the FDA EUA for laboratories certified under CLIA to perform high or moderate complexity testing. This test has not been FDA cleared or approved. A negative result does not rule out the presence of PCR inhibitors in the specimen or target RNA in concentration below the limit of detection for the assay. If only one viral target is positive but coinfection with multiple targets is suspected, the sample should be re-tested with another FDA cleared, approved, or authorized test, if coinfection would change clinical management. This test was validated by the [facility]. These laboratories are certified under the Clinical Laboratory  Improvement Amendments of 1988 (CLIA-88) as qualified to perform high complexity laboratory testing.   CBC with platelets and differential   Result Value Ref Range    WBC Count 5.8 4.0 - 11.0 10e3/uL    RBC Count 5.50 (H) 3.80 - 5.20 10e6/uL    Hemoglobin 14.4 11.7 - 15.7 g/dL    Hematocrit 46.6 35.0 - 47.0 %    MCV 85 78 - 100 fL    MCH 26.2 (L) 26.5 - 33.0 pg    MCHC 30.9 (L) 31.5 - 36.5 g/dL    RDW 18.4 (H) 10.0 - 15.0 %    Platelet Count 188 150 - 450 10e3/uL    % Neutrophils 70 %    % Lymphocytes 15 %    % Monocytes 12 %    % Eosinophils 2 %    % Basophils 1 %    % Immature Granulocytes 0 %    NRBCs per 100 WBC 0 <1 /100    Absolute Neutrophils 4.1 1.6 - 8.3 10e3/uL    Absolute Lymphocytes 0.9 0.8 - 5.3 10e3/uL    Absolute Monocytes 0.7 0.0 - 1.3 10e3/uL    Absolute Eosinophils 0.1 0.0 - 0.7 10e3/uL    Absolute Basophils 0.1 0.0 - 0.2 10e3/uL    Absolute Immature Granulocytes 0.0 <=0.4 10e3/uL    Absolute NRBCs 0.0 10e3/uL   Chest XR,  PA & LAT    Narrative    EXAM: XR CHEST 2 VIEWS  LOCATION: [facility]  DATE: 12/21/2023    INDICATION: Dyspnea  COMPARISON: Chest radiograph 05/02/2022, 03/23/2022      Impression    IMPRESSION: Lung volumes are low with patchy bilateral and bibasilar opacities, favored to represent edema and/or atelectasis, less likely infection. No pleural effusion or pneumothorax. Unchanged cardiomegaly and prominence of the pulmonary   vasculature/cnadace.   UA with Microscopic reflex to Culture    Specimen: Urine, Midstream   Result Value Ref Range    Color Urine Yellow Colorless, Straw, Light Yellow, Yellow    Appearance Urine Turbid (A) Clear    Glucose Urine Negative Negative mg/dL    Bilirubin Urine Negative Negative    Ketones Urine Negative Negative mg/dL    Specific Gravity Urine 1.009 1.001 - 1.030    Blood Urine >1.0 mg/dL (A) Negative    pH Urine 6.0 5.0 - 7.0    Protein Albumin Urine 100 (A) Negative mg/dL    Urobilinogen Urine 4.0 (A) <2.0 mg/dL    Nitrite  Urine Positive (A) Negative    Leukocyte Esterase Urine 500 Gigi/uL (A) Negative    Bacteria Urine Moderate (A) None Seen /HPF    WBC Clumps Urine Present (A) None Seen /HPF    RBC Urine 31 (H) <=2 /HPF    WBC Urine >182 (H) <=5 /HPF    Squamous Epithelials Urine 1 <=1 /HPF    Narrative    Urine Culture ordered based on laboratory criteria   Abd/pelvis CT no contrast - Stone Protocol    Narrative    EXAM: CT ABDOMEN PELVIS W/O CONTRAST  LOCATION: [facility]  DATE: 12/21/2023    INDICATION: Right flank pain  COMPARISON: CT abdomen pelvis 04/06/2022, 02/21/2022  TECHNIQUE: CT scan of the abdomen and pelvis was performed without IV contrast. Multiplanar reformats were obtained. Dose reduction techniques were used.  CONTRAST: None.    FINDINGS:   LOWER CHEST: No worrisome lung nodules or consolidation in the visualized lung bases. No pleural or pericardial effusion. Mild cardiomegaly with mitral annular calcifications.    HEPATOBILIARY: Hepatic steatosis. Cholecystectomy. No gross biliary ductal dilation.    PANCREAS: Normal.    SPLEEN: Normal.    ADRENAL GLANDS: Normal.    KIDNEYS/BLADDER: Normal unenhanced renal contours. No hydronephrosis, though there is periureteral and pericystic stranding with bladder wall thickening.    BOWEL: No bowel obstruction. Normal appendix. No evidence of diverticulitis. No ascites.    LYMPH NODES: Prominent bilateral iliac and retroperitoneal lymph nodes, likely reactive.    VASCULATURE: Unremarkable.    PELVIC ORGANS: Uterus is present. Unchanged 9 cm benign left ovarian dermoid (which requires no follow-up).    MUSCULOSKELETAL: No aggressive osseous lesions. Diffuse body wall edema.      Impression    IMPRESSION:   1.  Pericystic and bilateral ureteral stranding as well as bladder wall thickening, suspicious for cystitis/ascending urinary tract infection. No urolithiasis or hydronephrosis.  2.  Prominent bilateral iliac and retroperitoneal lymph nodes, which  are favored to be reactive.  3.  Hepatic steatosis.     Lactic acid whole blood   Result Value Ref Range    Lactic Acid 1.7 0.7 - 2.0 mmol/L         RADIOLOGY  Abd/pelvis CT no contrast - Stone Protocol   Final Result   IMPRESSION:    1.  Pericystic and bilateral ureteral stranding as well as bladder wall thickening, suspicious for cystitis/ascending urinary tract infection. No urolithiasis or hydronephrosis.   2.  Prominent bilateral iliac and retroperitoneal lymph nodes, which are favored to be reactive.   3.  Hepatic steatosis.         Chest XR,  PA & LAT   Final Result   IMPRESSION: Lung volumes are low with patchy bilateral and bibasilar opacities, favored to represent edema and/or atelectasis, less likely infection. No pleural effusion or pneumothorax. Unchanged cardiomegaly and prominence of the pulmonary    vasculature/candace.           EKG:    Rate: 102 bpm  Rhythm: Normal Sinus Rhythm  Axis: Normal  Interval: Normal  Conduction: Normal  QRS: Prolonged  ST: Normal  T-wave: Normal  QT: Not prolonged  Comparison EKG: no significant change compared to 6 April 2022  Impression:  No acute ischemic change   I have independently reviewed and interpreted today's EKG, pending Cardiologist read          MEDICATIONS GIVEN IN THE EMERGENCY:  Medications   cefTRIAXone (ROCEPHIN) 1 g vial to attach to  mL bag for ADULTS or NS 50 mL bag for PEDS (1 g Intravenous $New Bag 12/21/23 0532)   furosemide (LASIX) injection 20 mg (has no administration in time range)       NEW PRESCRIPTIONS STARTED AT TODAY'S ER VISIT  New Prescriptions    No medications on file        I, Mahsa Shirley, am serving as a scribe to document services personally performed by Ravin Guillen D.O., based on my observations and the provider's statements to me.  I, Ravin Guillen D.O., attest that Mahsa Shirley is acting in a scribe capacity, has observed my performance of the services and has documented them in accordance with my direction.     Ravin Guillen  DAMON  Emergency Medicine  [facility] EMERGENCY DEPARTMENT  [address] [place] [id number]  [phone]  Dept: [phone]       Ravin Guillen DO  12/21/23 1553

## 2023-12-21 NOTE — ED TRIAGE NOTES
Patient presents via ambulance for right flank pain and hypoxia.  Flank pain started 3 days ago.  When medics took O2 sat on scene was 83%.  Patient put on 3L oxygen.  History of CHF.     Triage Assessment (Adult)       Row Name 12/21/23 1311          Triage Assessment    Airway WDL WDL        Respiratory WDL    Respiratory WDL X  shortness of breath        Skin Circulation/Temperature WDL    Skin Circulation/Temperature WDL WDL        Cardiac WDL    Cardiac WDL WDL        Cognitive/Neuro/Behavioral WDL    Cognitive/Neuro/Behavioral WDL WDL

## 2023-12-22 ENCOUNTER — APPOINTMENT (OUTPATIENT)
Dept: CARDIOLOGY | Facility: HOSPITAL | Age: 48
DRG: 291 | End: 2023-12-22
Attending: INTERNAL MEDICINE
Payer: MEDICARE

## 2023-12-22 LAB
ANION GAP SERPL CALCULATED.3IONS-SCNC: 12 MMOL/L (ref 7–15)
ATRIAL RATE - MUSE: 102 BPM
BUN SERPL-MCNC: 15.8 MG/DL (ref 6–20)
CALCIUM SERPL-MCNC: 9.5 MG/DL (ref 8.6–10)
CHLORIDE SERPL-SCNC: 100 MMOL/L (ref 98–107)
CREAT SERPL-MCNC: 0.87 MG/DL (ref 0.51–0.95)
DEPRECATED HCO3 PLAS-SCNC: 23 MMOL/L (ref 22–29)
DIASTOLIC BLOOD PRESSURE - MUSE: 61 MMHG
EGFRCR SERPLBLD CKD-EPI 2021: 81 ML/MIN/1.73M2
GLUCOSE BLDC GLUCOMTR-MCNC: 117 MG/DL (ref 70–99)
GLUCOSE SERPL-MCNC: 123 MG/DL (ref 70–99)
INTERPRETATION ECG - MUSE: NORMAL
LVEF ECHO: NORMAL
MRSA DNA SPEC QL NAA+PROBE: NEGATIVE
P AXIS - MUSE: 79 DEGREES
POTASSIUM SERPL-SCNC: 3.6 MMOL/L (ref 3.4–5.3)
PR INTERVAL - MUSE: 186 MS
QRS DURATION - MUSE: 102 MS
QT - MUSE: 382 MS
QTC - MUSE: 497 MS
R AXIS - MUSE: 9 DEGREES
SA TARGET DNA: NEGATIVE
SODIUM SERPL-SCNC: 135 MMOL/L (ref 135–145)
SYSTOLIC BLOOD PRESSURE - MUSE: 138 MMHG
T AXIS - MUSE: 152 DEGREES
VENTRICULAR RATE- MUSE: 102 BPM

## 2023-12-22 PROCEDURE — 99232 SBSQ HOSP IP/OBS MODERATE 35: CPT | Performed by: INTERNAL MEDICINE

## 2023-12-22 PROCEDURE — 210N000001 HC R&B IMCU HEART CARE

## 2023-12-22 PROCEDURE — 93306 TTE W/DOPPLER COMPLETE: CPT | Mod: 26 | Performed by: INTERNAL MEDICINE

## 2023-12-22 PROCEDURE — 80048 BASIC METABOLIC PNL TOTAL CA: CPT | Performed by: INTERNAL MEDICINE

## 2023-12-22 PROCEDURE — 36415 COLL VENOUS BLD VENIPUNCTURE: CPT | Performed by: INTERNAL MEDICINE

## 2023-12-22 PROCEDURE — 999N000157 HC STATISTIC RCP TIME EA 10 MIN

## 2023-12-22 PROCEDURE — 250N000011 HC RX IP 250 OP 636: Mod: JZ | Performed by: INTERNAL MEDICINE

## 2023-12-22 PROCEDURE — 250N000013 HC RX MED GY IP 250 OP 250 PS 637: Performed by: INTERNAL MEDICINE

## 2023-12-22 PROCEDURE — 255N000002 HC RX 255 OP 636: Performed by: INTERNAL MEDICINE

## 2023-12-22 RX ORDER — ACETAMINOPHEN 325 MG/1
650 TABLET ORAL EVERY 4 HOURS PRN
Status: DISCONTINUED | OUTPATIENT
Start: 2023-12-22 | End: 2023-12-27 | Stop reason: HOSPADM

## 2023-12-22 RX ADMIN — PERFLUTREN 3.5 ML: 6.52 INJECTION, SUSPENSION INTRAVENOUS at 10:58

## 2023-12-22 RX ADMIN — METOPROLOL SUCCINATE 25 MG: 25 TABLET, EXTENDED RELEASE ORAL at 08:34

## 2023-12-22 RX ADMIN — CEFTRIAXONE SODIUM 1 G: 1 INJECTION, POWDER, FOR SOLUTION INTRAMUSCULAR; INTRAVENOUS at 14:51

## 2023-12-22 RX ADMIN — FUROSEMIDE 40 MG: 10 INJECTION, SOLUTION INTRAMUSCULAR; INTRAVENOUS at 08:16

## 2023-12-22 RX ADMIN — MICONAZOLE NITRATE ANTIFUNGAL POWDER: 2 POWDER TOPICAL at 21:16

## 2023-12-22 RX ADMIN — ENOXAPARIN SODIUM 40 MG: 40 INJECTION SUBCUTANEOUS at 21:10

## 2023-12-22 RX ADMIN — FUROSEMIDE 40 MG: 10 INJECTION, SOLUTION INTRAMUSCULAR; INTRAVENOUS at 17:03

## 2023-12-22 RX ADMIN — ENOXAPARIN SODIUM 40 MG: 40 INJECTION SUBCUTANEOUS at 08:16

## 2023-12-22 RX ADMIN — SERTRALINE HYDROCHLORIDE 100 MG: 100 TABLET ORAL at 08:16

## 2023-12-22 RX ADMIN — ACETAMINOPHEN 650 MG: 325 TABLET ORAL at 08:34

## 2023-12-22 ASSESSMENT — ACTIVITIES OF DAILY LIVING (ADL)
ADLS_ACUITY_SCORE: 39
ADLS_ACUITY_SCORE: 37
DEPENDENT_IADLS:: CLEANING;COOKING;LAUNDRY;SHOPPING;MEAL PREPARATION;TRANSPORTATION
ADLS_ACUITY_SCORE: 37
ADLS_ACUITY_SCORE: 47
ADLS_ACUITY_SCORE: 37
ADLS_ACUITY_SCORE: 37
ADLS_ACUITY_SCORE: 47
ADLS_ACUITY_SCORE: 47

## 2023-12-22 NOTE — PLAN OF CARE
Problem: Adult Inpatient Plan of Care  Goal: Plan of Care Review  Description: The Plan of Care Review/Shift note should be completed every shift.  The Outcome Evaluation is a brief statement about your assessment that the patient is improving, declining, or no change.  This information will be displayed automatically on your shift  note.  Outcome: Progressing     Problem: Pain Acute  Goal: Optimal Pain Control and Function  Outcome: Progressing  Intervention: Prevent or Manage Pain  Recent Flowsheet Documentation  Taken 12/22/2023 1615 by Drew Dow RN  Medication Review/Management: medications reviewed     Problem: Heart Failure  Goal: Stable Heart Rate and Rhythm  Outcome: Progressing     Problem: Heart Failure  Goal: Effective Oxygenation and Ventilation  Outcome: Progressing  Intervention: Promote Airway Secretion Clearance  Recent Flowsheet Documentation  Taken 12/22/2023 1615 by Drew Dow RN  Activity Management: activity adjusted per tolerance  Intervention: Optimize Oxygenation and Ventilation  Recent Flowsheet Documentation  Taken 12/22/2023 1615 by Drew Dow RN  Head of Bed (HOB) Positioning: HOB at 30 degrees     Problem: Heart Failure  Goal: Effective Oxygenation and Ventilation  Intervention: Optimize Oxygenation and Ventilation  Recent Flowsheet Documentation  Taken 12/22/2023 1615 by Drew Dow RN  Head of Bed (HOB) Positioning: HOB at 30 degrees     Problem: Heart Failure  Goal: Effective Breathing Pattern During Sleep  Outcome: Progressing  Intervention: Monitor and Manage Obstructive Sleep Apnea  Recent Flowsheet Documentation  Taken 12/22/2023 1615 by Drew Dow RN  Medication Review/Management: medications reviewed   Goal Outcome Evaluation:               Alert. Oriented.    Denied pain.    Reported her shortness of breath with activity improving. Lung sounds diminished. On low 90s with O2 at 4 lpm nc. Home CPAP at hs with 6 lpm O2 to keep O2 sat 90%.     With urinary urgency,  frequency and incontinence. Purewick.     Stool incontinence noted as well.    Heavy assist of 2-3 with turning and repositioning. Falls precaution.

## 2023-12-22 NOTE — PLAN OF CARE
Goal Outcome Evaluation:      Plan of Care Reviewed With: patient    Overall Patient Progress: no changeOverall Patient Progress: no change         A & O X4. Denies pain. Sinus tachycardia. On 4L of O2. Used home CPAP for few hours at night, then switched back to nasal cannula. Purewick in place.

## 2023-12-22 NOTE — PROGRESS NOTES
[facility]    Medicine Progress Note - Hospitalist Service    Date of Admission:  12/21/2023    Assessment & Plan      Bess Enamorado is a 48 year old female admitted on 12/21/2023 for CHF exacerbation and acute pyelonephritis.    HFpEF exacerbation, acute hypoxemic respiratory failure:  Presented with shortness of breath due to diuretic noncompliance. PTA bumex 3 mg daily. She stopped taking it due to urinary symptoms from acute pyelonephritis.   Checks x-ray findings are consistent with pulmonary edema.  Echocardiogram showed LVEF 50-55% without significant valvular heart disease, similar to findings on 5/13/22.  - Continue Lasix 40 mg bid.   - Repeat echocardiogram  - Continue PTA metoprolol  - Intake and output  - Daily weight  - Monitor electrolytes and creatinine while on diuresis. Patient reports that she developed HUNG on diuretics in the past.   - Needed 3-4 LPM supplemental oxygen since admission. She is not on supplemental oxygen at home. Wean as tolerated.     Acute right pyelonephritis:  Presents with urinary urgency and right flank pain for 4 days.  CT scan findings suggestive of ascending UTI. No urolithiasis or hydronephrosis.   - Started ceftriaxone in ER.  Continue.  Preliminary urine culture result shows GNB. Follow-up final urine culture result.     Bilateral lower extremity cellulitis:  Patient notices that her bilateral inner thigh have become more erythematous recently.  - Continue Ceftriaxone as above. MRSA screen negative.    Mood disorder: Continue PTA Zoloft    Obesity: BMI 59.83.           Diet: Low Saturated Fat Na <2400 mg    DVT Prophylaxis: Enoxaparin (Lovenox) SQ  Brar Catheter: Not present  Lines: None     Cardiac Monitoring: ACTIVE order. Indication: Acute decompensated heart failure (48 hours)  Code Status: Full Code      Clinically Significant Risk Factors                  # Hypertension: Noted on problem list  # Acute heart failure with preserved ejection  "fraction: heart failure noted on problem list, last echo with EF >50%, and receiving IV diuretics       # Severe Obesity: Estimated body mass index is 59.83 kg/m  as calculated from the following:    Height as of this encounter: 1.626 m (5' 4\").    Weight as of this encounter: 158.1 kg (348 lb 9.1 oz)., PRESENT ON ADMISSION     # Financial/Environmental Concerns: none         Disposition Plan      Expected Discharge Date: 12/25/2023      Destination: home  Discharge Comments: iv lasix, iv abx            Laxmi Madison MD  Hospitalist Service  [facility]  Securely message with [employer] (more info)  Text page via [employer] Paging/Directory   ______________________________________________________________________    Interval History   Patient reports that her SOB improved today. The swelling and redness of her thighs also improved. Her right flank pain has improved.    Physical Exam   Vital Signs: Temp: 97.5  F (36.4  C) Temp src: Oral BP: 120/75 Pulse: 80   Resp: 20 SpO2: 90 % O2 Device: Nasal cannula Oxygen Delivery: 4 LPM  Weight: 348 lbs 9.11 oz    General appearance: not in acute distress  HEENT: PERRL, EOMI  Lungs: Clear breath sounds in bilateral lung fields  Cardiovascular: Regular rate and rhythm, normal S1-S2  Abdomen: Soft, non tender, no distension.   Musculoskeletal: No joint swelling  Skin: Mild bilateral lower leg edema. The skin of her bilateral inner thigh has erythema  Neurology: AAO ×3.  Cranial nerves II - XII normal.  Normal muscle strength in all four extremities.     Medical Decision Making       45 MINUTES SPENT BY ME on the date of service doing chart review, history, exam, documentation & further activities per the note.      Data     I have personally reviewed the following data over the past 24 hrs:    N/A  \   N/A   / N/A     135 100 15.8 /  117 (H)   3.6 23 0.87 \       Imaging results reviewed over the past 24 hrs:   Recent Results (from the past 24 hour(s)) "   Echocardiogram Complete   Result Value    LVEF  50-55%    Narrative    [id number]  [id number]  [id number]  [id number]^DEBBIE^[place] [address]     Name: CITLALY MICHEL  MRN: [id number]  : 1975  Study Date: 2023 10:12 AM  Age: 48 yrs  Gender: Female  Patient Location: [facility]  Reason For Study: CHF  Ordering Physician: LENO LEWIS  Performed By: ZACHARIAH     BSA: 2.5 m2  Height: 64 in  Weight: 348 lb  HR: 90  BP: 127/64 mmHg  ______________________________________________________________________________  Procedure  Complete Portable Echo Adult. Definity (NDC #31638-904) given intravenously.  Lot [id number], Exp 1 May 24.  ______________________________________________________________________________  Interpretation Summary     The visual ejection fraction is 50-55%.  Regional wall motion abnormalities cannot be excluded due to limited  visualization.  The right ventricle is mildly dilated.  Mildly decreased right ventricular systolic function  No significant valve disease identified.  The study was technically difficult. Compared to the prior study dated  2022, there have been no changes.  ______________________________________________________________________________  Left Ventricle  The left ventricle is mildly dilated. There is normal left ventricular wall  thickness. The visual ejection fraction is 50-55%. Regional wall motion  abnormalities cannot be excluded due to limited visualization.     Right Ventricle  The right ventricle is mildly dilated. TAPSE is abnormal, which is consistent  with abnormal right ventricular systolic function. Mildly decreased right  ventricular systolic function.     Atria  The left atrium is not well visualized. Right atrium not well visualized.     Mitral Valve  There is mild to moderate mitral annular calcification. The mitral valve is  not well visualized. There is trace mitral regurgitation.     Tricuspid Valve  The tricuspid  valve is not well visualized. There is trace to mild tricuspid  regurgitation. The right ventricular systolic pressure is approximated at  46mmHg plus the right atrial pressure.     Aortic Valve  The aortic valve is not well visualized. No aortic regurgitation is present.  No aortic stenosis is present.     Pulmonic Valve  The pulmonic valve is not well visualized. There is trace pulmonic valvular  regurgitation.     Vessels  Mildly dilated ascending aorta. IVC diameter >2.1 cm collapsing <50% with  sniff suggests a high RA pressure estimated at 15 mmHg or greater.     Pericardium  There is no pericardial effusion.     ______________________________________________________________________________  MMode/2D Measurements & Calculations  IVSd: 1.6 cm  LVIDd: 5.7 cm  LVIDs: 5.0 cm  LVPWd: 0.60 cm  FS: 11.6 %     LV mass(C)d: 249.0 grams  LV mass(C)dI: 100.6 grams/m2  LA dimension: 4.6 cm  asc Aorta Diam: 4.0 cm  LVOT diam: 2.4 cm  LVOT area: 4.4 cm2  Asc Ao diam index BSA (cm/m2): 1.6  Asc Ao diam index Ht(cm/m): 2.4  RWT: 0.21  TAPSE: 1.4 cm     Doppler Measurements & Calculations  MV E max jim: 104.0 cm/sec  MV A max jim: 111.0 cm/sec  MV E/A: 0.94  MV dec slope: 296.9 cm/sec2  MV dec time: 0.35 sec  Ao V2 max: 120.7 cm/sec  Ao max P.0 mmHg  Ao V2 mean: 94.2 cm/sec  Ao mean P.0 mmHg  Ao V2 VTI: 19.9 cm  SHANELL(I,D): 2.8 cm2  SHANELL(V,D): 3.2 cm2  LV V1 max PG: 3.0 mmHg  LV V1 max: 86.5 cm/sec  LV V1 VTI: 12.7 cm  SV(LVOT): 55.9 ml  SI(LVOT): 22.6 ml/m2  PA acc time: 0.07 sec     TR max jim: 327.5 cm/sec  TR max P.1 mmHg  AV Jim Ratio (DI): 0.72  SHANELL Index (cm2/m2): 1.1  E/E': 18.4  E/E' av.6  Lateral E/e': 18.4  Medial E/e': 20.8  Peak E' Jim: 5.7 cm/sec  RV S Jim: 10.1 cm/sec     ______________________________________________________________________________  Report approved by: Lian Castillo 2023 02:23 PM

## 2023-12-22 NOTE — PLAN OF CARE
Problem: Adult Inpatient Plan of Care  Goal: Plan of Care Review  Description: The Plan of Care Review/Shift note should be completed every shift.  The Outcome Evaluation is a brief statement about your assessment that the patient is improving, declining, or no change.  This information will be displayed automatically on your shift  note.  Outcome: Progressing     Problem: Pain Acute  Goal: Optimal Pain Control and Function  Outcome: Progressing  Intervention: Prevent or Manage Pain  Recent Flowsheet Documentation  Taken 12/22/2023 1200 by Zaida Rivera, RN  Medication Review/Management: medications reviewed  Taken 12/22/2023 0800 by Zaida Rivera, RN  Medication Review/Management: medications reviewed     Problem: Heart Failure  Goal: Optimal Coping  Outcome: Progressing   Goal Outcome Evaluation:             Tyl effective for 2/10 headache this morning.  Purewick in place.  IV antibiotics and lasix.  Patient alerts staff to her needs.

## 2023-12-22 NOTE — CARE PLAN
Heart Failure Care Map  GOALS TO BE MET BEFORE DISCHARGE:    1. Decrease congestion and/or edema with diuretic therapy to achieve near optimal volume status.     Dyspnea improved: No, further care required to meet this goal. Please explain she started lasix IV.    Edema improved:  no         Last 24 hour I/O:   Intake/Output Summary (Last 24 hours) at 12/21/2023 1857  Last data filed at 12/21/2023 1711  Gross per 24 hour   Intake 100 ml   Output 1400 ml   Net -1300 ml           Net I/O and Weights since admission:   11/21 2300 - 12/21 2259  In: 100   Out: 1400 [Urine:1400]  Net: -1300   There were no vitals filed for this visit.    2.  O2 sats > 90% on room air, or at prior home O2 therapy level. No      Able to wean O2 this shift to keep sats above 90%?:    Does patient use Home O2? No          Current oxygenation status:   SpO2: 92 %     O2 Device: Nasal cannula, Oxygen Delivery: 3 LPM    3.  Tolerates ambulation and mobility near baseline.     Ambulation: no   Times patient ambulated with staff this shift: pt came in the unit this evening and SOB noted during care/ activity.    Preview the Heart Failure Care Map for additional HF goal outcomes.    Carlita Fletcher RN  12/21/2023

## 2023-12-22 NOTE — CONSULTS
Care Management Initial Consult    General Information  Assessment completed with: Patient, Patient  Type of CM/SW Visit: Initial Assessment    Primary Care Provider verified and updated as needed: Yes   Readmission within the last 30 days: no previous admission in last 30 days      Reason for Consult: discharge planning  Advance Care Planning: Advance Care Planning Reviewed: verified with patient        Communication Assessment  Patient's communication style: spoken language (English or Bilingual)        Cognitive  Cognitive/Neuro/Behavioral: WDL                      Living Environment:   People in home: alone     Current living Arrangements: Townhouse      Able to return to prior arrangements: yes     Family/Social Support:  Care provided by:  Self  Provides care for: no one  Marital Status: Single             Description of Support System:    siblings     Current Resources:   Patient receiving home care services: No     Community Resources:  Has PCA hours, but no help, does not remember agency  Equipment currently used at home:  Walker, WC, shower chair, commode, CPAP  Supplies currently used at home: Walker, WC, shower chair, commode, CPAP    Employment/Financial:  Employment Status: disabled        Financial Concerns: none   Referral to Financial Worker: No     Does the patient's insurance plan have a 3 day qualifying hospital stay waiver?  No    Lifestyle & Psychosocial Needs:  Social Determinants of Health     Food Insecurity: Low Risk  (9/25/2023)    Food Insecurity     Within the past 12 months, did you worry that your food would run out before you got money to buy more?: No     Within the past 12 months, did the food you bought just not last and you didn t have money to get more?: No   Depression: At risk (10/19/2023)    PHQ-2     PHQ-2 Score: 3   Housing Stability: Low Risk  (9/25/2023)    Housing Stability     Do you have housing? : Yes     Are you worried about losing your housing?: No   Tobacco Use:  Low Risk  (12/21/2023)    Patient History     Smoking Tobacco Use: Never     Smokeless Tobacco Use: Never     Passive Exposure: Not on file   Financial Resource Strain: Low Risk  (9/25/2023)    Financial Resource Strain     Within the past 12 months, have you or your family members you live with been unable to get utilities (heat, electricity) when it was really needed?: No   Alcohol Use: Not on file   Transportation Needs: High Risk (9/25/2023)    Transportation Needs     Within the past 12 months, has lack of transportation kept you from medical appointments, getting your medicines, non-medical meetings or appointments, work, or from getting things that you need?: Yes   Physical Activity: Inactive (3/1/2022)    Exercise Vital Sign     Days of Exercise per Week: 0 days     Minutes of Exercise per Session: 0 min   Interpersonal Safety: Low Risk  (9/27/2023)    Interpersonal Safety     Do you feel physically and emotionally safe where you currently live?: Yes     Within the past 12 months, have you been hit, slapped, kicked or otherwise physically hurt by someone?: No     Within the past 12 months, have you been humiliated or emotionally abused in other ways by your partner or ex-partner?: No   Stress: No Stress Concern Present (12/4/2020)    [employer] [place] [employer] - Occupational Stress Questionnaire     Feeling of Stress : Only a little   Social Connections: Unknown (3/1/2022)    Social Connection and Isolation Panel [NHANES]     Frequency of Communication with Friends and Family: Twice a week     Frequency of Social Gatherings with Friends and Family: Twice a week     Attends Anabaptist Services: Not on file     Active Member of Clubs or Organizations: Not on file     Attends Club or Organization Meetings: Not on file     Marital Status: Not on file       Functional Status:  Prior to admission patient needed assistance:   Dependent ADLs:: Ambulation-walker, Bathing  Dependent IADLs:: Cleaning,  Cooking, Laundry, Shopping, Meal Preparation, Transportation      Additional Information:  Writer met with patient at bedside to review role of care management services, discuss goals of care and assess need for any possible services at discharge. Patient alert, answering questions appropriately and engaged in the conversation. Patient has no HCD and declined info. Lives alone in a townhouse. Need assist with ADLs/IADLs d/t to bilateral leg lymphedema. Has PCA hours, but no help. Does not remember the agency. Has siblings who assist with transportation. On disability. Has walker, WC, shower chair, commode, CPAP. Did not qualify for O2 at home. Goal is to return home. Family will transport.       Amina Hernandez RN

## 2023-12-23 ENCOUNTER — APPOINTMENT (OUTPATIENT)
Dept: PHYSICAL THERAPY | Facility: HOSPITAL | Age: 48
DRG: 291 | End: 2023-12-23
Attending: STUDENT IN AN ORGANIZED HEALTH CARE EDUCATION/TRAINING PROGRAM
Payer: MEDICARE

## 2023-12-23 ENCOUNTER — APPOINTMENT (OUTPATIENT)
Dept: OCCUPATIONAL THERAPY | Facility: HOSPITAL | Age: 48
DRG: 291 | End: 2023-12-23
Attending: STUDENT IN AN ORGANIZED HEALTH CARE EDUCATION/TRAINING PROGRAM
Payer: MEDICARE

## 2023-12-23 LAB
ANION GAP SERPL CALCULATED.3IONS-SCNC: 11 MMOL/L (ref 7–15)
BACTERIA UR CULT: ABNORMAL
BUN SERPL-MCNC: 15.4 MG/DL (ref 6–20)
CALCIUM SERPL-MCNC: 9.7 MG/DL (ref 8.6–10)
CHLORIDE SERPL-SCNC: 102 MMOL/L (ref 98–107)
CREAT SERPL-MCNC: 0.82 MG/DL (ref 0.51–0.95)
DEPRECATED HCO3 PLAS-SCNC: 25 MMOL/L (ref 22–29)
EGFRCR SERPLBLD CKD-EPI 2021: 87 ML/MIN/1.73M2
GLUCOSE SERPL-MCNC: 90 MG/DL (ref 70–99)
POTASSIUM SERPL-SCNC: 3.6 MMOL/L (ref 3.4–5.3)
SODIUM SERPL-SCNC: 138 MMOL/L (ref 135–145)

## 2023-12-23 PROCEDURE — 97166 OT EVAL MOD COMPLEX 45 MIN: CPT | Mod: GO

## 2023-12-23 PROCEDURE — 210N000001 HC R&B IMCU HEART CARE

## 2023-12-23 PROCEDURE — 97535 SELF CARE MNGMENT TRAINING: CPT | Mod: GO

## 2023-12-23 PROCEDURE — 36415 COLL VENOUS BLD VENIPUNCTURE: CPT | Performed by: INTERNAL MEDICINE

## 2023-12-23 PROCEDURE — 80048 BASIC METABOLIC PNL TOTAL CA: CPT | Performed by: INTERNAL MEDICINE

## 2023-12-23 PROCEDURE — 97161 PT EVAL LOW COMPLEX 20 MIN: CPT | Mod: GP

## 2023-12-23 PROCEDURE — 99232 SBSQ HOSP IP/OBS MODERATE 35: CPT | Performed by: STUDENT IN AN ORGANIZED HEALTH CARE EDUCATION/TRAINING PROGRAM

## 2023-12-23 PROCEDURE — 999N000157 HC STATISTIC RCP TIME EA 10 MIN

## 2023-12-23 PROCEDURE — 250N000013 HC RX MED GY IP 250 OP 250 PS 637: Performed by: INTERNAL MEDICINE

## 2023-12-23 PROCEDURE — 250N000011 HC RX IP 250 OP 636: Mod: JZ | Performed by: INTERNAL MEDICINE

## 2023-12-23 RX ADMIN — SERTRALINE HYDROCHLORIDE 100 MG: 100 TABLET ORAL at 08:32

## 2023-12-23 RX ADMIN — ENOXAPARIN SODIUM 40 MG: 40 INJECTION SUBCUTANEOUS at 08:32

## 2023-12-23 RX ADMIN — FUROSEMIDE 40 MG: 10 INJECTION, SOLUTION INTRAMUSCULAR; INTRAVENOUS at 17:52

## 2023-12-23 RX ADMIN — ENOXAPARIN SODIUM 40 MG: 40 INJECTION SUBCUTANEOUS at 21:04

## 2023-12-23 RX ADMIN — FUROSEMIDE 40 MG: 10 INJECTION, SOLUTION INTRAMUSCULAR; INTRAVENOUS at 08:32

## 2023-12-23 RX ADMIN — CEFTRIAXONE SODIUM 1 G: 1 INJECTION, POWDER, FOR SOLUTION INTRAMUSCULAR; INTRAVENOUS at 14:19

## 2023-12-23 RX ADMIN — MICONAZOLE NITRATE ANTIFUNGAL POWDER: 2 POWDER TOPICAL at 21:04

## 2023-12-23 RX ADMIN — MICONAZOLE NITRATE ANTIFUNGAL POWDER: 2 POWDER TOPICAL at 08:33

## 2023-12-23 RX ADMIN — METOPROLOL SUCCINATE 25 MG: 25 TABLET, EXTENDED RELEASE ORAL at 08:32

## 2023-12-23 ASSESSMENT — ACTIVITIES OF DAILY LIVING (ADL)
ADLS_ACUITY_SCORE: 47

## 2023-12-23 NOTE — PROGRESS NOTES
Care Management Follow Up    Length of Stay (days): 2    Expected Discharge Date: 12/25/2023    Concerns to be Addressed:   IV Lasix twice a day; IV Rocephin; supplemental oxygen (4 liters this am).   Patient plan of care discussed at interdisciplinary rounds: Yes    Anticipated Discharge Disposition:  Discharge goal is home pending response to treatment, medical needs and mobility closer to discharge.      Anticipated Discharge Services:  To be determined.   Anticipated Discharge DME:  Per therapy (if indicated). Has a walker, wheelchair, shower chair and commode at home.     Patient/family educated on Medicare website which has current facility and service quality ratings:   NA  Education Provided on the Discharge Plan:   Per team  Patient/Family in Agreement with the Plan:   Yes    Referrals Placed by CM/SW:   None  Private pay costs discussed: Not applicable     Additional Information:  Patient lives alone in a townhouse. She needs assist with ADLs/IADLs due to bilateral leg lymphedema and qualifies for PCA hours. However, she has not yet obtained a PCA. She does not remember the agency that she is working with. Her siblings assist with transportation. Patient is on disability. She has a walker, wheelchair, shower chair, commode and CPAP at home. Her goal is to return home at discharge. Family will transport.     Anne Lopez RN

## 2023-12-23 NOTE — PROGRESS NOTES
"   12/23/23 1500   Appointment Info   Signing Clinician's Name / Credentials (PT) Daija Portillo, PT, DPT   Living Environment   People in Home alone   Current Living Arrangements other (see comments)  ([facility])   Home Accessibility no concerns   Self-Care   Equipment Currently Used at Home grab bar, tub/shower;shower chair;walker, rolling;wheelchair, manual   Fall history within last six months no   General Information   Onset of Illness/Injury or Date of Surgery 12/21/23   Referring Physician Camron Mcclellan MD   Patient/Family Therapy Goals Statement (PT) Return to Home   Pertinent History of Current Problem (include personal factors and/or comorbidities that impact the POC) Per Chart Review -\"48 year old female admitted on 12/21/2023 for CHF exacerbation and acute pyelonephritis.\"   Existing Precautions/Restrictions no known precautions/restrictions   Strength (Manual Muscle Testing)   Strength (Manual Muscle Testing) Deficits observed during functional mobility   Bed Mobility   Bed Mobility supine-sit;sit-supine   Supine-Sit [place] (Bed Mobility) supervision;verbal cues;minimum assist (75% patient effort)   Sit-Supine [place] (Bed Mobility) supervision;verbal cues;moderate assist (50% patient effort);2 person assist  (LE management)   Transfers   Transfers sit-stand transfer   Sit-Stand Transfer   Sit-Stand [place] (Transfers) contact guard   Assistive Device (Sit-Stand Transfers) walker, front-wheeled   Gait/Stairs (Locomotion)   [place] Level (Gait) supervision;verbal cues;contact guard   Assistive Device (Gait) walker, front-wheeled   Distance in Feet (Gait) 8   Pattern (Gait) step-to   Deviations/Abnormal Patterns (Gait) gait speed decreased;leela decreased   Clinical Impression   Criteria for Skilled Therapeutic Intervention Yes, treatment indicated   PT Diagnosis (PT) Impaired Functional Mobility   Influenced by the following impairments weakness, decreased ROM "   Functional limitations due to impairments gait, transfers, bed mobility   Clinical Presentation (PT Evaluation Complexity) stable   Clinical Presentation Rationale pt presents as medically diagnosed   Clinical Decision Making (Complexity) low complexity   Planned Therapy Interventions (PT) balance training;bed mobility training;gait training;home exercise program;ROM (range of motion);strengthening;transfer training   Risk & Benefits of therapy have been explained evaluation/treatment results reviewed;patient   PT Total Evaluation Time   PT Eval, Low Complexity Minutes (85434) 10   Physical Therapy Goals   PT Frequency Daily   PT Predicted Duration/Target Date for Goal Attainment 12/30/23   PT Goals Bed Mobility;Transfers;Gait   PT: Bed Mobility Modified independent;Supine to/from sit   PT: Transfers Modified independent;Sit to/from stand;Assistive device   PT: Gait Modified independent;Rolling walker;25 feet   PT Discharge Planning   PT Plan gait as claude, LE therex, pivot transfers   PT Discharge Recommendation (DC Rec) home with assist;home with home care physical therapy   PT Rationale for DC Rec pt able to complete functional mobility required at home. pt may benefit from Home PT for further progressing strength/activity tolerance   PT Brief overview of current status Min to Mod A for LE management bed mobility; CGA transfers & gait 8' with FWW   PT Equipment Needed at Discharge walker, rolling   Total Session Time   Total Session Time (sum of timed and untimed services) 10

## 2023-12-23 NOTE — PROGRESS NOTES
Pt had no questions or c/o. Pt on oxymask. Pt will call when ready for bed for help with her NPPv device.

## 2023-12-23 NOTE — PROGRESS NOTES
[facility]    Medicine Progress Note - Hospitalist Service    Date of Admission:  12/21/2023    Assessment & Plan   Bess Enamorado is a 48 year old female admitted on 12/21/2023 for CHF exacerbation and acute pyelonephritis.    #HFpEF with acute exacerbation  #Acute hypoxemic respiratory failure  Presented with shortness of breath due to diuretic non-adherence in setting of UTI. PTA bumex 3 mg daily. She stopped taking it due to urinary symptoms from acute pyelonephritis. Checks x-ray findings are consistent with pulmonary edema. Echocardiogram showed LVEF 50-55% without significant valvular heart disease, similar to findings on 5/13/22.  - Continue Lasix IV 40 mg bid, goal -2L  - Continue PTA metoprolol  - Intake and output  - Daily weight  - Monitor BMP  - Still needing O2, no baseline, wean as able    #Acute right pyelonephritis  Presents with urinary urgency and right flank pain for 4 days. CT scan findings suggestive of ascending UTI. No urolithiasis or hydronephrosis.   - Continue CTX 1g daily, plan for 7-10 days of therapy  - Ucx 12/21: E. Coli, resistant to Bactrim and ampicillin    #Bilateral lower extremity cellulitis  Patient notices that her bilateral inner thigh have become more erythematous recently.  - Continue Ceftriaxone as above. MRSA screen negative.    #Multiple sclerosis  Homebound due to this, chronic weakness. Lives alone but lives in a single [facility] and describes taking care of herself there.  - PT/OT consult, may be at baseline but would like to see if they think patent would benefit from therapy or just returning home    #Mood disorder: Continue PTA Zoloft  #Obesity: BMI 59.83.   #NARCISA: Continue CPAP        Diet: Low Saturated Fat Na <2400 mg  Room Service    DVT Prophylaxis: Enoxaparin (Lovenox) SQ  Brar Catheter: Not present  Lines: None     Cardiac Monitoring: ACTIVE order. Indication: Acute decompensated heart failure (48 hours)  Code Status: Full Code   "    Clinically Significant Risk Factors                  # Hypertension: Noted on problem list  # Acute heart failure with preserved ejection fraction: heart failure noted on problem list, last echo with EF >50%, and receiving IV diuretics       # Severe Obesity: Estimated body mass index is 59.18 kg/m  as calculated from the following:    Height as of this encounter: 1.626 m (5' 4\").    Weight as of this encounter: 156.4 kg (344 lb 12.8 oz)., PRESENT ON ADMISSION       # Financial/Environmental Concerns: none         Disposition Plan     Expected Discharge Date: 12/25/2023      Destination: home  Discharge Comments: iv lasix, iv abx            DIANE WOODARD MD  Hospitalist Service  [facility]  Securely message with [employer] (more info)  Text page via [employer] Paging/Directory   ______________________________________________________________________    Interval History   Continues to feel better. Right flank pain and SOB improving.    Physical Exam   Vital Signs: Temp: 98.1  F (36.7  C) Temp src: Oral BP: 123/74 Pulse: 77   Resp: 20 SpO2: 91 % O2 Device: Oxymask Oxygen Delivery: 4 LPM  Weight: 344 lbs 12.79 oz    General appearance: not in acute distress  HEENT: PERRL, EOMI  Lungs: Clear breath sounds in bilateral lung fields  Cardiovascular: Regular rate and rhythm, normal S1-S2  Abdomen: Soft, non tender, no distension.   Musculoskeletal: No joint swelling  Skin: Mild bilateral lower leg edema. The skin of her bilateral inner thigh has erythema  Neurology: AAO ×3.  Cranial nerves II - XII normal.  Normal muscle strength in all four extremities.     Medical Decision Making       45 MINUTES SPENT BY ME on the date of service doing chart review, history, exam, documentation & further activities per the note.      Data     I have personally reviewed the following data over the past 24 hrs:    N/A  \   N/A   / N/A     138 102 15.4 /  90   3.6 25 0.82 \       Imaging results reviewed over the " past 24 hrs:   No results found for this or any previous visit (from the past 24 hour(s)).

## 2023-12-23 NOTE — PROGRESS NOTES
12/23/23 7080   Appointment Info   Signing Clinician's Name / Credentials (OT) Maria Eugenia REAL kacey WOO/L   Living Environment   People in Home alone   Current Living Arrangements other (see comments)  (one level town home)   Home Accessibility no concerns   Transportation Anticipated family or friend will provide  (transportation is an issue per pt)   Living Environment Comments Pt has walk-in shower with grab rails and shower chair.  Pt mostly uses BS commode.   Self-Care   Current Activity Tolerance fair   Equipment Currently Used at Home grab bar, tub/shower;shower chair;walker, rolling;wheelchair, manual   Fall history within last six months no   Activity/Exercise/Self-Care Comment Pt is independent with basic ADLs, cooking, cleaning, laundry.  Pt orders groceries.  Pt has assist with garbage and mail.   General Information   Onset of Illness/Injury or Date of Surgery 12/21/23   Referring Physician Nathaniel   Patient/Family Therapy Goal Statement (OT) home   Additional Occupational Profile Info/Pertinent History of Current Problem Pt admitted with acute respiratory failure   Existing Precautions/Restrictions fall   Cognitive Status Examination   Cognitive Status Comments Appears grossly intact.   Visual Perception   Visual Impairment/Limitations WFL   Sensory   Sensory Comments numbness thumb and pointer finger of B hands   Range of Motion Comprehensive   General Range of Motion no range of motion deficits identified   Strength Comprehensive (MMT)   Comment, General Manual Muscle Testing (MMT) Assessment WFL for ADLs   Bed Mobility   Comment (Bed Mobility) Min A   Transfers   Transfer Comments CGA   Balance   Balance Comments CGA in standing   Activities of Daily Living   BADL Assessment/Intervention   (Max A to don socks)   Clinical Impression   Criteria for Skilled Therapeutic Interventions Met (OT) Yes, treatment indicated   OT Diagnosis Impaired ADL independence   OT Problem List-Impairments  impacting ADL activity tolerance impaired;balance;flexibility;mobility;strength   Assessment of Occupational Performance 3-5 Performance Deficits   Planned Therapy Interventions (OT) ADL retraining;balance training;bed mobility training;transfer training   Risk & Benefits of therapy have been explained evaluation/treatment results reviewed;participants included;patient   Clinical Impression Comments Pt seen bedside for OT eval and treatment.  Pt demonstrates decreased independence with ADLs and mobiltiy.  OT to continue to address.  Recommend home with increased assist for ADLs.  Pt is approved for PCA assist but has not been able to access assist.   OT Total Evaluation Time   OT Eval, Moderate Complexity Minutes (59364) 10   OT Goals   Therapy Frequency (OT) Daily   OT Predicted Duration/Target Date for Goal Attainment 12/30/23   OT Goals Upper Body Dressing;Lower Body Dressing;Transfers;Toilet Transfer/Toileting   OT: Upper Body Dressing Supervision/stand-by assist   OT: Lower Body Dressing Modified independent   OT: Transfer Modified independent   OT: Toilet Transfer/Toileting Modified independent   OT Discharge Planning   OT Plan dressing with AE, toileting, transfers   OT Discharge Recommendation (DC Rec) home with assist   OT Rationale for DC Rec Recommend home with assist for housework, bathing, and ADLs as needed.   OT Brief overview of current status Min A

## 2023-12-23 NOTE — PLAN OF CARE
Problem: Adult Inpatient Plan of Care  Goal: Optimal Comfort and Wellbeing  Outcome: Progressing   Goal Outcome Evaluation:    Good urine output, put out 1,650mL in the first hour after IV lasix. Full bed bath. She will be participating in therapy this afternoon as well. Calm and pleasant, family visiting. IV rocephin given.

## 2023-12-23 NOTE — PLAN OF CARE
Goal Outcome Evaluation:      Plan of Care Reviewed With: patient    Overall Patient Progress: no changeOverall Patient Progress: no change     Pt. Doing well overnight. Some O2 desat on the cpap, so increased O2 bleed-in to 5L. Once off cpap in a.m., on 4L per nasal cannula. VSS, denied pain, nausea, dyspnea. Continue to monitor.        You can access the FollowMyHealth Patient Portal offered by [facility] by registering at the following website: [email or web address]. By joining [employer]’s FollowMyHealth portal, you will also be able to view your health information using other applications (apps) compatible with our system.

## 2023-12-24 LAB
ANION GAP SERPL CALCULATED.3IONS-SCNC: 11 MMOL/L (ref 7–15)
BUN SERPL-MCNC: 14.5 MG/DL (ref 6–20)
CALCIUM SERPL-MCNC: 9.5 MG/DL (ref 8.6–10)
CHLORIDE SERPL-SCNC: 101 MMOL/L (ref 98–107)
CREAT SERPL-MCNC: 0.81 MG/DL (ref 0.51–0.95)
DEPRECATED HCO3 PLAS-SCNC: 25 MMOL/L (ref 22–29)
EGFRCR SERPLBLD CKD-EPI 2021: 88 ML/MIN/1.73M2
GLUCOSE SERPL-MCNC: 90 MG/DL (ref 70–99)
PLATELET # BLD AUTO: 220 10E3/UL (ref 150–450)
POTASSIUM SERPL-SCNC: 3.4 MMOL/L (ref 3.4–5.3)
SODIUM SERPL-SCNC: 137 MMOL/L (ref 135–145)

## 2023-12-24 PROCEDURE — 250N000013 HC RX MED GY IP 250 OP 250 PS 637: Performed by: INTERNAL MEDICINE

## 2023-12-24 PROCEDURE — 210N000001 HC R&B IMCU HEART CARE

## 2023-12-24 PROCEDURE — 250N000011 HC RX IP 250 OP 636: Mod: JZ | Performed by: INTERNAL MEDICINE

## 2023-12-24 PROCEDURE — 99232 SBSQ HOSP IP/OBS MODERATE 35: CPT | Performed by: STUDENT IN AN ORGANIZED HEALTH CARE EDUCATION/TRAINING PROGRAM

## 2023-12-24 PROCEDURE — 99207 PR CDG-CUT & PASTE-POTENTIAL IMPACT ON LEVEL: CPT | Performed by: STUDENT IN AN ORGANIZED HEALTH CARE EDUCATION/TRAINING PROGRAM

## 2023-12-24 PROCEDURE — 85049 AUTOMATED PLATELET COUNT: CPT | Performed by: INTERNAL MEDICINE

## 2023-12-24 PROCEDURE — 80048 BASIC METABOLIC PNL TOTAL CA: CPT | Performed by: INTERNAL MEDICINE

## 2023-12-24 PROCEDURE — 36415 COLL VENOUS BLD VENIPUNCTURE: CPT | Performed by: INTERNAL MEDICINE

## 2023-12-24 RX ADMIN — METOPROLOL SUCCINATE 25 MG: 25 TABLET, EXTENDED RELEASE ORAL at 09:52

## 2023-12-24 RX ADMIN — MICONAZOLE NITRATE ANTIFUNGAL POWDER: 2 POWDER TOPICAL at 20:17

## 2023-12-24 RX ADMIN — SERTRALINE HYDROCHLORIDE 100 MG: 100 TABLET ORAL at 09:55

## 2023-12-24 RX ADMIN — ENOXAPARIN SODIUM 40 MG: 40 INJECTION SUBCUTANEOUS at 20:17

## 2023-12-24 RX ADMIN — CEFTRIAXONE SODIUM 1 G: 1 INJECTION, POWDER, FOR SOLUTION INTRAMUSCULAR; INTRAVENOUS at 15:28

## 2023-12-24 RX ADMIN — ENOXAPARIN SODIUM 40 MG: 40 INJECTION SUBCUTANEOUS at 09:52

## 2023-12-24 RX ADMIN — FUROSEMIDE 40 MG: 10 INJECTION, SOLUTION INTRAMUSCULAR; INTRAVENOUS at 17:38

## 2023-12-24 RX ADMIN — FUROSEMIDE 40 MG: 10 INJECTION, SOLUTION INTRAMUSCULAR; INTRAVENOUS at 09:52

## 2023-12-24 RX ADMIN — MICONAZOLE NITRATE ANTIFUNGAL POWDER: 2 POWDER TOPICAL at 09:54

## 2023-12-24 ASSESSMENT — ACTIVITIES OF DAILY LIVING (ADL)
ADLS_ACUITY_SCORE: 51
ADLS_ACUITY_SCORE: 42
ADLS_ACUITY_SCORE: 51
ADLS_ACUITY_SCORE: 42
ADLS_ACUITY_SCORE: 51
ADLS_ACUITY_SCORE: 51
ADLS_ACUITY_SCORE: 47
ADLS_ACUITY_SCORE: 51
ADLS_ACUITY_SCORE: 42
ADLS_ACUITY_SCORE: 47

## 2023-12-24 NOTE — PROGRESS NOTES
[facility]    Medicine Progress Note - Hospitalist Service    Date of Admission:  12/21/2023    Assessment & Plan   Bess Enamorado is a 48 year old female admitted on 12/21/2023 for CHF exacerbation and acute pyelonephritis.    #HFpEF with acute exacerbation  #Acute hypoxemic respiratory failure  Presented with shortness of breath due to diuretic non-adherence in setting of UTI. PTA bumex 3 mg daily. She stopped taking it due to urinary symptoms from acute pyelonephritis. Checks x-ray findings are consistent with pulmonary edema. Echocardiogram showed LVEF 50-55% without significant valvular heart disease, similar to findings on 5/13/22.  - Continue Lasix IV 40 mg bid, goal -2L  - Continue PTA metoprolol  - Intake and output  - Daily weight  - Monitor BMP  - Still needing O2, no baseline, wean as able    #Acute right pyelonephritis  Presents with urinary urgency and right flank pain for 4 days. CT scan findings suggestive of ascending UTI. No urolithiasis or hydronephrosis.   - Continue CTX 1g daily, plan for 7-10 days of therapy  - Ucx 12/21: E. Coli, resistant to Bactrim and ampicillin    #Bilateral lower extremity cellulitis  Patient notices that her bilateral inner thigh have become more erythematous recently.  - Continue Ceftriaxone as above. MRSA screen negative.    #Multiple sclerosis  Homebound due to this, chronic weakness. Lives alone but lives in a single [facility] and describes taking care of herself there.  - PT/OT consult, okay for home, CM to see if patient wants  PT    #Mood disorder: Continue PTA Zoloft  #Obesity: BMI 59.83.   #NARCISA: Continue CPAP        Diet: Low Saturated Fat Na <2400 mg  Room Service    DVT Prophylaxis: Enoxaparin (Lovenox) SQ  Brar Catheter: Not present  Lines: None     Cardiac Monitoring: ACTIVE order. Indication: Acute decompensated heart failure (48 hours)  Code Status: Full Code      Clinically Significant Risk Factors                  #  "Hypertension: Noted on problem list  # Acute heart failure with preserved ejection fraction: heart failure noted on problem list, last echo with EF >50%, and receiving IV diuretics       # Severe Obesity: Estimated body mass index is 58.47 kg/m  as calculated from the following:    Height as of this encounter: 1.626 m (5' 4\").    Weight as of this encounter: 154.5 kg (340 lb 9.8 oz)., PRESENT ON ADMISSION       # Financial/Environmental Concerns: none         Disposition Plan     Expected Discharge Date: 12/25/2023      Destination: home  Discharge Comments: iv lasix, iv abx            DIANE WOODARD MD  Hospitalist Service  [facility]  Securely message with [email or web address] (more info)  Text page via [employer] Paging/Directory   ______________________________________________________________________    Interval History   SOB improving. Disappointed she won't go home today.    Physical Exam   Vital Signs: Temp: 98.3  F (36.8  C) Temp src: Oral BP: 135/76 Pulse: 81   Resp: 20 SpO2: 90 % O2 Device: Oxymask Oxygen Delivery: 4 LPM  Weight: 340 lbs 9.77 oz    General appearance: not in acute distress  HEENT: PERRL, EOMI  Lungs: Bibasilar crackles  Cardiovascular: Regular rate and rhythm, normal S1-S2  Abdomen: Soft, non tender, no distension.   Musculoskeletal: No joint swelling  Skin: 2+ bilateral lower leg edema, ACE wrapped. The skin of her bilateral inner thigh has erythema  Neurology: AAO ×4    Medical Decision Making       45 MINUTES SPENT BY ME on the date of service doing chart review, history, exam, documentation & further activities per the note.      Data     I have personally reviewed the following data over the past 24 hrs:    N/A  \   N/A   / 220     137 101 14.5 /  90   3.4 25 0.81 \       Imaging results reviewed over the past 24 hrs:   No results found for this or any previous visit (from the past 24 hour(s)).    "

## 2023-12-24 NOTE — PLAN OF CARE
Goal Outcome Evaluation:      Plan of Care Reviewed With: patient    Overall Patient Progress: improvingOverall Patient Progress: improving  Doing well overnight. Denied pain, nausea, or dyspnea. Continues on oxymask at 4L and wearing cpap at night with O2 bleed-in. Continue to monitor.

## 2023-12-24 NOTE — PROGRESS NOTES
Care Management Follow Up    Length of Stay (days): 3    Expected Discharge Date: 12/25/2023    Concerns to be Addressed:   IV Lasix twice a day; IV Rocephin; supplemental oxygen (4 liters this am).   Patient plan of care discussed at interdisciplinary rounds: Yes    Anticipated Discharge Disposition:  Discharge goal is home pending response to treatment, medical needs and mobility closer to discharge. Therapy agrees with home, recommends home care.      Anticipated Discharge Services:  Home therapy.   Anticipated Discharge DME:  Per therapy (if indicated). Has a walker, wheelchair, shower chair and commode at home.     Patient/family educated on Medicare website which has current facility and service quality ratings:   NA  Education Provided on the Discharge Plan:   Per team  Patient/Family in Agreement with the Plan:   Yes    Referrals Placed by CM/SW:   None  Private pay costs discussed: Not applicable     Additional Information:  Patient lives alone in a townhouse. She needs assist with ADLs/IADLs due to bilateral leg lymphedema and qualifies for PCA hours. However, she has not yet obtained a PCA. She does not remember the agency that she is working with. Her siblings assist with transportation. Patient is on disability. She has a walker, wheelchair, shower chair, commode and CPAP at home. Her goal is to return home at discharge. Therapy is recommending home care. Writer will meet with patient later today to discuss options.   2:44 PM: Met with patient to review recommendation for home care. Patient does not want home care.   Family will transport.     Anne Lopez RN

## 2023-12-24 NOTE — PLAN OF CARE
Problem: Adult Inpatient Plan of Care  Goal: Plan of Care Review  Description: The Plan of Care Review/Shift note should be completed every shift.  The Outcome Evaluation is a brief statement about your assessment that the patient is improving, declining, or no change.  This information will be displayed automatically on your shift  note.  Outcome: Progressing     Problem: Pain Acute  Goal: Optimal Pain Control and Function  Outcome: Progressing  Intervention: Prevent or Manage Pain  Recent Flowsheet Documentation  Taken 12/23/2023 2102 by Drew Dow RN  Medication Review/Management: medications reviewed  Taken 12/23/2023 1744 by Drew Dow RN  Medication Review/Management: medications reviewed  Intervention: Optimize Psychosocial Wellbeing  Recent Flowsheet Documentation  Taken 12/23/2023 2102 by Drew Dow RN  Supportive Measures:   active listening utilized   positive reinforcement provided  Taken 12/23/2023 1744 by Drew Dow RN  Supportive Measures:   active listening utilized   positive reinforcement provided     Problem: Heart Failure  Goal: Optimal Coping  Outcome: Progressing  Intervention: Support Psychosocial Response  Recent Flowsheet Documentation  Taken 12/23/2023 2102 by Drew Dow RN  Supportive Measures:   active listening utilized   positive reinforcement provided  Taken 12/23/2023 1744 by Drew Dow RN  Supportive Measures:   active listening utilized   positive reinforcement provided     Problem: Heart Failure  Goal: Effective Oxygenation and Ventilation  Outcome: Progressing  Intervention: Promote Airway Secretion Clearance  Recent Flowsheet Documentation  Taken 12/23/2023 2102 by Drew Dow RN  Activity Management: activity adjusted per tolerance  Taken 12/23/2023 1744 by Drew Dow RN  Activity Management: activity adjusted per tolerance  Intervention: Optimize Oxygenation and Ventilation  Recent Flowsheet Documentation  Taken 12/23/2023 2102 by Drew Dow RN  Head  of Bed (HOB) Positioning: HOB at 30 degrees  Taken 12/23/2023 1744 by Drew Dow RN  Head of Bed (HOB) Positioning: HOB at 30 degrees     Problem: UTI (Urinary Tract Infection)  Goal: Improved Infection Symptoms  Outcome: Progressing   Goal Outcome Evaluation:               Alert. Oriented.    Denied pain.    Reported breathing about the same, but coughing becoming less. Lung sounds diminished. On O2 at 4 lpm. Patient requested to wear oxymask instead of nc for comfort.  Patient was taking off mask this afternoon. She was 86% on roomair. O2 sat was 88-89% when on O2. O2 increased to 5 lpm to keep O2 sat 90%.    Urinary frequency and urgency about the same. On external urinary catheter for strict I/O and to help conserve energy. Urine output 2025 ml.     Assist of 2 with turning and repositioning in bed. PT / OT saw patient this afternonn.

## 2023-12-25 LAB
ANION GAP SERPL CALCULATED.3IONS-SCNC: 8 MMOL/L (ref 7–15)
BUN SERPL-MCNC: 14.7 MG/DL (ref 6–20)
CALCIUM SERPL-MCNC: 9.6 MG/DL (ref 8.6–10)
CHLORIDE SERPL-SCNC: 102 MMOL/L (ref 98–107)
CREAT SERPL-MCNC: 0.8 MG/DL (ref 0.51–0.95)
DEPRECATED HCO3 PLAS-SCNC: 28 MMOL/L (ref 22–29)
EGFRCR SERPLBLD CKD-EPI 2021: 90 ML/MIN/1.73M2
GLUCOSE SERPL-MCNC: 94 MG/DL (ref 70–99)
POTASSIUM SERPL-SCNC: 3.5 MMOL/L (ref 3.4–5.3)
SODIUM SERPL-SCNC: 138 MMOL/L (ref 135–145)

## 2023-12-25 PROCEDURE — 999N000157 HC STATISTIC RCP TIME EA 10 MIN

## 2023-12-25 PROCEDURE — 250N000013 HC RX MED GY IP 250 OP 250 PS 637: Performed by: INTERNAL MEDICINE

## 2023-12-25 PROCEDURE — 36415 COLL VENOUS BLD VENIPUNCTURE: CPT | Performed by: INTERNAL MEDICINE

## 2023-12-25 PROCEDURE — 80048 BASIC METABOLIC PNL TOTAL CA: CPT | Performed by: INTERNAL MEDICINE

## 2023-12-25 PROCEDURE — 99232 SBSQ HOSP IP/OBS MODERATE 35: CPT | Performed by: INTERNAL MEDICINE

## 2023-12-25 PROCEDURE — 210N000001 HC R&B IMCU HEART CARE

## 2023-12-25 PROCEDURE — 250N000011 HC RX IP 250 OP 636: Mod: JZ | Performed by: INTERNAL MEDICINE

## 2023-12-25 RX ORDER — CEFDINIR 300 MG/1
300 CAPSULE ORAL EVERY 12 HOURS SCHEDULED
Status: DISCONTINUED | OUTPATIENT
Start: 2023-12-25 | End: 2023-12-27 | Stop reason: HOSPADM

## 2023-12-25 RX ADMIN — SERTRALINE HYDROCHLORIDE 100 MG: 100 TABLET ORAL at 09:14

## 2023-12-25 RX ADMIN — CEFDINIR 300 MG: 300 CAPSULE ORAL at 20:34

## 2023-12-25 RX ADMIN — ENOXAPARIN SODIUM 40 MG: 40 INJECTION SUBCUTANEOUS at 09:14

## 2023-12-25 RX ADMIN — METOPROLOL SUCCINATE 25 MG: 25 TABLET, EXTENDED RELEASE ORAL at 09:14

## 2023-12-25 RX ADMIN — ENOXAPARIN SODIUM 40 MG: 40 INJECTION SUBCUTANEOUS at 20:35

## 2023-12-25 RX ADMIN — CEFTRIAXONE SODIUM 1 G: 1 INJECTION, POWDER, FOR SOLUTION INTRAMUSCULAR; INTRAVENOUS at 14:00

## 2023-12-25 RX ADMIN — BUMETANIDE 3 MG: 1 TABLET ORAL at 14:04

## 2023-12-25 RX ADMIN — MICONAZOLE NITRATE ANTIFUNGAL POWDER: 2 POWDER TOPICAL at 20:35

## 2023-12-25 RX ADMIN — MICONAZOLE NITRATE ANTIFUNGAL POWDER: 2 POWDER TOPICAL at 09:15

## 2023-12-25 RX ADMIN — FUROSEMIDE 40 MG: 10 INJECTION, SOLUTION INTRAMUSCULAR; INTRAVENOUS at 09:14

## 2023-12-25 ASSESSMENT — ACTIVITIES OF DAILY LIVING (ADL)
ADLS_ACUITY_SCORE: 42

## 2023-12-25 NOTE — PLAN OF CARE
Problem: Adult Inpatient Plan of Care  Goal: Plan of Care Review  Description: The Plan of Care Review/Shift note should be completed every shift.  The Outcome Evaluation is a brief statement about your assessment that the patient is improving, declining, or no change.  This information will be displayed automatically on your shift  note.  Outcome: Progressing     Problem: Heart Failure  Goal: Optimal Coping  Outcome: Progressing     Problem: UTI (Urinary Tract Infection)  Goal: Improved Infection Symptoms  Outcome: Progressing   Goal Outcome Evaluation:         Patient continues on IV antibiotic, IV lasix and supplemental O2.  Attempted to wean from supplemental O2 but SpO2 drops into the 80s when on 3L.  Encouraged increase in activity.  Encouraged her to use bedside commode but she was resistive stating she would have an accident by time she got if she had to wait even a second.

## 2023-12-25 NOTE — PLAN OF CARE
Problem: Adult Inpatient Plan of Care  Goal: Optimal Comfort and Wellbeing  12/25/2023 0635 by Bharath Vernon, RN  Outcome: Not Progressing  12/24/2023 2303 by Bharath Vernon RN  Outcome: Progressing     Problem: Heart Failure  Goal: Optimal Coping  12/25/2023 0635 by Bharath Vernon, RN  Outcome: Not Progressing  12/24/2023 2303 by Bharath Vernon RN  Outcome: Progressing   Goal Outcome Evaluation:    Denied pain, on cpap at bedtime, lungs clear and diminished. Able to make needs known. No acute events overnight.

## 2023-12-25 NOTE — PROGRESS NOTES
[facility]    Medicine Progress Note - Hospitalist Service    Date of Admission:  12/21/2023    Assessment & Plan      Bess Enamorado is a 48 year old female admitted on 12/21/2023 for CHF exacerbation and acute pyelonephritis.    HFpEF exacerbation:  Presented with shortness of breath due to diuretic noncompliance. PTA bumex 3 mg daily. She stopped taking it due to urinary symptoms from acute pyelonephritis.   Checks x-ray findings are consistent with pulmonary edema.  Echocardiogram showed LVEF 50-55% without significant valvular heart disease, similar to findings on 5/13/22.  - Received IV Lasix 40 mg bid. Will change back to her home dose Bumex 3 mg daily today.   - Continue PTA metoprolol  - Intake and output  - Daily weight  - Monitor electrolytes and creatinine while on diuresis.    Acute on chronic hypoxemic respiratory failure  Acute hypoxia due to CHF exacerbation. Initially needed 4 LPM supplemental oxygen and now weaned down to 3 LPM. However, it can not be further tapered down. Per chart review, patient was placed on home oxygen back in 2021 after hospitalization due to NARCISA and obesity hypoventilation. Patient reports that she eventually got weaned off.  - Continue to wean as able. Plan to discharge home with home oxygen.     Acute right pyelonephritis:  Presents with urinary urgency and right flank pain for 4 days.  CT scan findings suggestive of ascending UTI. No urolithiasis or hydronephrosis.   - On ceftriaxone since admission. Urine culture shows E coli. Urinary symptoms have now resolved. Switched to Cefdinir today to complete 7 day antibiotic.     Bilateral lower extremity cellulitis:  Patient notices that her bilateral inner thigh have become more erythematous recently.  - Received Ceftriaxone and the erythema has now resolved. Continue antibiotics as above. MRSA screen negative.    Major depression: No active issues. Continue PTA Zoloft    Bilateral lymphedema: on  "lymphedema wrap.     Obesity: BMI 59.83.           Diet: Low Saturated Fat Na <2400 mg  Room Service    DVT Prophylaxis: Enoxaparin (Lovenox) SQ  Brar Catheter: Not present  Lines: None     Cardiac Monitoring: ACTIVE order. Indication: Acute decompensated heart failure (48 hours)  Code Status: Full Code      Clinically Significant Risk Factors                  # Hypertension: Noted on problem list  # Acute heart failure with preserved ejection fraction: heart failure noted on problem list, last echo with EF >50%, and receiving IV diuretics       # Severe Obesity: Estimated body mass index is 56.72 kg/m  as calculated from the following:    Height as of this encounter: 1.626 m (5' 4\").    Weight as of this encounter: 149.9 kg (330 lb 7.5 oz)., PRESENT ON ADMISSION       # Financial/Environmental Concerns: none         Disposition Plan     Expected Discharge Date: 12/25/2023      Destination: home  Discharge Comments: iv lasix, iv abx  plans to go home and does NOT want home care (12/24)            Laxmi Madison MD  Hospitalist Service  [facility]  Securely message with [employer] (more info)  Text page via [employer] Paging/Directory   ______________________________________________________________________    Interval History   Patient reports that her breathing is at her baseline. However, she remains needing supplemental oxygen at 4LPM. Patient reports that she used to use supplemental oxygen. It was discontinued about 18 months ago sine her insurance no longer covers it. Patient reports that her right flank pain has resolved.     Physical Exam   Vital Signs: Temp: 98  F (36.7  C) Temp src: Oral BP: 109/72 Pulse: 69   Resp: 16 SpO2: 92 % O2 Device: Nasal cannula Oxygen Delivery: 3 LPM  Weight: 330 lbs 7.51 oz    General appearance: not in acute distress  HEENT: PERRL, EOMI  Lungs: Clear breath sounds in bilateral lung fields  Cardiovascular: Regular rate and rhythm, normal S1-S2  Abdomen: Soft, non " tender, no distension.   Musculoskeletal: No joint swelling  Skin: No erythema and no swelling. The previous lower extremity erythema and swelling have resolved.   Neurology: AAO ×3.  Cranial nerves II - XII normal.  Normal muscle strength in all four extremities.     Medical Decision Making       45 MINUTES SPENT BY ME on the date of service doing chart review, history, exam, documentation & further activities per the note.      Data     I have personally reviewed the following data over the past 24 hrs:    N/A  \   N/A   / N/A     138 102 14.7 /  94   3.5 28 0.80 \       Imaging results reviewed over the past 24 hrs:   No results found for this or any previous visit (from the past 24 hour(s)).

## 2023-12-25 NOTE — PLAN OF CARE
Problem: Adult Inpatient Plan of Care  Goal: Optimal Comfort and Wellbeing  Outcome: Progressing     Problem: Pain Acute  Goal: Optimal Pain Control and Function  Outcome: Progressing     Problem: Adult Inpatient Plan of Care  Goal: Absence of Hospital-Acquired Illness or Injury  Intervention: Prevent Skin Injury  Recent Flowsheet Documentation  Taken 12/24/2023 2000 by Bharath Vernon RN  Body Position: position changed independently   Goal Outcome Evaluation:    A & O x 4, VSS, NSR, on 4 L oxymask. Denied pain. Lymphedema wraps on BLE. Pt is still in IV abx and IV lasix. On home cpap at bedtime. Pleasant and cooperative.

## 2023-12-25 NOTE — PLAN OF CARE
Problem: Adult Inpatient Plan of Care  Goal: Plan of Care Review  Description: The Plan of Care Review/Shift note should be completed every shift.  The Outcome Evaluation is a brief statement about your assessment that the patient is improving, declining, or no change.  This information will be displayed automatically on your shift  note.  Outcome: Progressing     Problem: Pain Acute  Goal: Optimal Pain Control and Function  Outcome: Progressing  Intervention: Prevent or Manage Pain  Recent Flowsheet Documentation  Taken 12/25/2023 1200 by Zaida Rivera, RN  Medication Review/Management: medications reviewed  Taken 12/25/2023 0800 by Zaida Rivera, RN  Medication Review/Management: medications reviewed     Problem: Heart Failure  Goal: Optimal Coping  Outcome: Progressing   Goal Outcome Evaluation:       Patient up to chair with 2 assist and transfer belt.  Assist needed to help move legs.  Patient has urgency with urination.  Declined to bedside commode because she states she will not make it time.  Purwick used.  Patient denies pain.  Continue with powder and Inter-dry to abdominal and breast folds.  Patient A&O x3.  He sister visited today. Attempted to wean from O2 but unable.  Patient requires 3-4L NC to keep SpO2 >92%

## 2023-12-25 NOTE — PROGRESS NOTES
"Care Management Follow Up    Length of Stay (days): 4    Expected Discharge Date: 12/25/2023     Concerns to be Addressed:     Care progression  Patient plan of care discussed at interdisciplinary rounds: Yes    Anticipated Discharge Disposition:  Home with home care PT     Anticipated Discharge Services:  Home with home care PT  Anticipated Discharge DME:  NA    Patient/family educated on Medicare website which has current facility and service quality ratings:  NA  Education Provided on the Discharge Plan:  Yes per team  Patient/Family in Agreement with the Plan: yes    Referrals Placed by CM/SW:  Yes  Private pay costs discussed: Not applicable    Additional Information:  Met with patient at bedside to discuss PT discharge rec for home care PT.  Patient said, \"Well, if it's recommended, then yes.\"  Patient did not have a home care agency selected  Referral sent to [facility]    Social Hx: \"Lives alone in a townhouse. Need assist with ADLs/IADLs d/t to bilateral leg lymphedema. Has PCA hours, but no help. Has siblings who assist with transportation. On disability. Has walker, WC, shower chair, commode, CPAP. Goal is to return home. PT rec home care. Family will transport.\"    RNCM to follow for medical progression, recommendations, and final discharge plan.     Amina Hernandez RN     "

## 2023-12-26 ENCOUNTER — APPOINTMENT (OUTPATIENT)
Dept: RADIOLOGY | Facility: HOSPITAL | Age: 48
DRG: 291 | End: 2023-12-26
Attending: INTERNAL MEDICINE
Payer: MEDICARE

## 2023-12-26 PROBLEM — J96.21 ACUTE ON CHRONIC RESPIRATORY FAILURE WITH HYPOXIA AND HYPERCAPNIA (H): Status: ACTIVE | Noted: 2023-12-26

## 2023-12-26 PROBLEM — J96.22 ACUTE ON CHRONIC RESPIRATORY FAILURE WITH HYPOXIA AND HYPERCAPNIA (H): Status: ACTIVE | Noted: 2023-12-26

## 2023-12-26 LAB
ANION GAP SERPL CALCULATED.3IONS-SCNC: 10 MMOL/L (ref 7–15)
BACTERIA BLD CULT: NO GROWTH
BACTERIA BLD CULT: NO GROWTH
BUN SERPL-MCNC: 14.2 MG/DL (ref 6–20)
CALCIUM SERPL-MCNC: 9.6 MG/DL (ref 8.6–10)
CHLORIDE SERPL-SCNC: 101 MMOL/L (ref 98–107)
CREAT SERPL-MCNC: 0.77 MG/DL (ref 0.51–0.95)
D DIMER PPP FEU-MCNC: 0.73 UG/ML FEU (ref 0–0.5)
DEPRECATED HCO3 PLAS-SCNC: 27 MMOL/L (ref 22–29)
EGFRCR SERPLBLD CKD-EPI 2021: >90 ML/MIN/1.73M2
GLUCOSE SERPL-MCNC: 95 MG/DL (ref 70–99)
HOLD SPECIMEN: NORMAL
POTASSIUM SERPL-SCNC: 3.7 MMOL/L (ref 3.4–5.3)
SODIUM SERPL-SCNC: 138 MMOL/L (ref 135–145)

## 2023-12-26 PROCEDURE — 250N000011 HC RX IP 250 OP 636: Mod: JZ | Performed by: INTERNAL MEDICINE

## 2023-12-26 PROCEDURE — 99232 SBSQ HOSP IP/OBS MODERATE 35: CPT | Performed by: INTERNAL MEDICINE

## 2023-12-26 PROCEDURE — 210N000001 HC R&B IMCU HEART CARE

## 2023-12-26 PROCEDURE — 80048 BASIC METABOLIC PNL TOTAL CA: CPT | Performed by: INTERNAL MEDICINE

## 2023-12-26 PROCEDURE — 999N000157 HC STATISTIC RCP TIME EA 10 MIN

## 2023-12-26 PROCEDURE — 85379 FIBRIN DEGRADATION QUANT: CPT | Performed by: INTERNAL MEDICINE

## 2023-12-26 PROCEDURE — 250N000013 HC RX MED GY IP 250 OP 250 PS 637: Performed by: INTERNAL MEDICINE

## 2023-12-26 PROCEDURE — 36415 COLL VENOUS BLD VENIPUNCTURE: CPT | Performed by: INTERNAL MEDICINE

## 2023-12-26 PROCEDURE — 71045 X-RAY EXAM CHEST 1 VIEW: CPT

## 2023-12-26 RX ADMIN — MICONAZOLE NITRATE ANTIFUNGAL POWDER: 2 POWDER TOPICAL at 21:05

## 2023-12-26 RX ADMIN — CEFDINIR 300 MG: 300 CAPSULE ORAL at 20:57

## 2023-12-26 RX ADMIN — ENOXAPARIN SODIUM 40 MG: 40 INJECTION SUBCUTANEOUS at 21:03

## 2023-12-26 RX ADMIN — SERTRALINE HYDROCHLORIDE 100 MG: 100 TABLET ORAL at 09:15

## 2023-12-26 RX ADMIN — MICONAZOLE NITRATE ANTIFUNGAL POWDER: 2 POWDER TOPICAL at 09:17

## 2023-12-26 RX ADMIN — BUMETANIDE 3 MG: 1 TABLET ORAL at 09:15

## 2023-12-26 RX ADMIN — CEFDINIR 300 MG: 300 CAPSULE ORAL at 09:15

## 2023-12-26 RX ADMIN — METOPROLOL SUCCINATE 25 MG: 25 TABLET, EXTENDED RELEASE ORAL at 09:15

## 2023-12-26 RX ADMIN — ENOXAPARIN SODIUM 40 MG: 40 INJECTION SUBCUTANEOUS at 09:16

## 2023-12-26 ASSESSMENT — ACTIVITIES OF DAILY LIVING (ADL)
ADLS_ACUITY_SCORE: 42

## 2023-12-26 NOTE — PLAN OF CARE
Problem: Heart Failure  Goal: Optimal Coping  Outcome: Progressing     Problem: Heart Failure  Goal: Optimal Functional Ability  Intervention: Optimize Functional Ability  Recent Flowsheet Documentation  Taken 12/25/2023 1510 by Mino Granados, RN  Activity Management:   activity adjusted per tolerance   bedrest     Problem: Heart Failure  Goal: Effective Oxygenation and Ventilation  Outcome: Progressing  Intervention: Promote Airway Secretion Clearance  Recent Flowsheet Documentation  Taken 12/25/2023 1600 by Mino Granados, RN  Cough And Deep Breathing: done independently per patient  Taken 12/25/2023 1510 by Mino Granados, RN  Activity Management:   activity adjusted per tolerance   bedrest   Goal Outcome Evaluation:    Pt Aox4, pleasant affect, calm and cooperative with cares. Sats >90% on 3L NC. Denies SOB, CP. No NVD reported. Very high UOP this afternoon following oral diuretic. Pt started on oral Abx this evening. Up to chair/back to bed with assist of 2 and walker. Leg swelling bilaterally, slightly worse in left leg. Able to make needs known.

## 2023-12-26 NOTE — PROVIDER NOTIFICATION
Patient has been assessed for Home Oxygen needs.     Pulse oximetry (SpO2) and Oxygen flow readings:    SpO2 = 90% on room air at rest while awake.    SpO2 improved to 95 (92%-95)% on 2 liters/minute at rest.    SpO2 = 87% on room air during activity/with exercise.    *SpO2 improved to 92 (91%-94% depending on activity)% on 3 liters/minute during activity/with exercise.

## 2023-12-26 NOTE — PLAN OF CARE
Problem: Adult Inpatient Plan of Care  Goal: Plan of Care Review  Description: The Plan of Care Review/Shift note should be completed every shift.  The Outcome Evaluation is a brief statement about your assessment that the patient is improving, declining, or no change.  This information will be displayed automatically on your shift  note.  Outcome: Progressing     Problem: Pain Acute  Goal: Optimal Pain Control and Function  Outcome: Progressing     Problem: Heart Failure  Goal: Optimal Coping  Outcome: Progressing     Problem: UTI (Urinary Tract Infection)  Goal: Improved Infection Symptoms  Outcome: Progressing   Goal Outcome Evaluation:       Patient transitioned from IV antibiotics to PO.  Incontinent with urgency and frequency per baseline.  Purwick in place.  Two assist to place it.  One to hold back tissue and the other to place device. Concern over increased oxygen demand today but then questioned accuracy of finger pulse ox.  Used forehead pulse ox with improved reading.  Finger probe read 92% on 5L. Forehead reading was 95% on 2L.  Home Oxygen assessment done using forehead probe. Finger tips dusky as well as lower extremities. Legs bumpy, purplish/red, dry, with cracked flaky skin. Ace wrappings changed this shift.  Powder to rash in abdominal and breast folds.  Rash is clearing.  One assist to ambulate from chair to bathroom with walker and gait belt.  Patient up in chair for meals.  She uses call light to make needs known

## 2023-12-27 ENCOUNTER — TELEPHONE (OUTPATIENT)
Dept: FAMILY MEDICINE | Facility: CLINIC | Age: 48
End: 2023-12-27
Payer: MEDICARE

## 2023-12-27 ENCOUNTER — APPOINTMENT (OUTPATIENT)
Dept: PHYSICAL THERAPY | Facility: HOSPITAL | Age: 48
DRG: 291 | End: 2023-12-27
Payer: MEDICARE

## 2023-12-27 VITALS
DIASTOLIC BLOOD PRESSURE: 65 MMHG | TEMPERATURE: 97.7 F | WEIGHT: 293 LBS | BODY MASS INDEX: 50.02 KG/M2 | SYSTOLIC BLOOD PRESSURE: 110 MMHG | OXYGEN SATURATION: 94 % | HEART RATE: 70 BPM | RESPIRATION RATE: 20 BRPM | HEIGHT: 64 IN

## 2023-12-27 LAB
ANION GAP SERPL CALCULATED.3IONS-SCNC: 7 MMOL/L (ref 7–15)
BUN SERPL-MCNC: 15.5 MG/DL (ref 6–20)
CALCIUM SERPL-MCNC: 9.8 MG/DL (ref 8.6–10)
CHLORIDE SERPL-SCNC: 101 MMOL/L (ref 98–107)
CREAT SERPL-MCNC: 0.75 MG/DL (ref 0.51–0.95)
DEPRECATED HCO3 PLAS-SCNC: 30 MMOL/L (ref 22–29)
EGFRCR SERPLBLD CKD-EPI 2021: >90 ML/MIN/1.73M2
GLUCOSE SERPL-MCNC: 92 MG/DL (ref 70–99)
PLATELET # BLD AUTO: 238 10E3/UL (ref 150–450)
POTASSIUM SERPL-SCNC: 3.6 MMOL/L (ref 3.4–5.3)
SODIUM SERPL-SCNC: 138 MMOL/L (ref 135–145)

## 2023-12-27 PROCEDURE — 250N000013 HC RX MED GY IP 250 OP 250 PS 637: Performed by: INTERNAL MEDICINE

## 2023-12-27 PROCEDURE — 36415 COLL VENOUS BLD VENIPUNCTURE: CPT | Performed by: INTERNAL MEDICINE

## 2023-12-27 PROCEDURE — 250N000011 HC RX IP 250 OP 636: Mod: JZ | Performed by: INTERNAL MEDICINE

## 2023-12-27 PROCEDURE — 85049 AUTOMATED PLATELET COUNT: CPT | Performed by: INTERNAL MEDICINE

## 2023-12-27 PROCEDURE — 97530 THERAPEUTIC ACTIVITIES: CPT | Mod: GP

## 2023-12-27 PROCEDURE — 97116 GAIT TRAINING THERAPY: CPT | Mod: GP

## 2023-12-27 PROCEDURE — 80048 BASIC METABOLIC PNL TOTAL CA: CPT | Performed by: INTERNAL MEDICINE

## 2023-12-27 PROCEDURE — 99239 HOSP IP/OBS DSCHRG MGMT >30: CPT | Performed by: INTERNAL MEDICINE

## 2023-12-27 RX ADMIN — SERTRALINE HYDROCHLORIDE 100 MG: 100 TABLET ORAL at 08:06

## 2023-12-27 RX ADMIN — MICONAZOLE NITRATE ANTIFUNGAL POWDER 1 APPLICATOR: 2 POWDER TOPICAL at 08:06

## 2023-12-27 RX ADMIN — BUMETANIDE 3 MG: 1 TABLET ORAL at 08:06

## 2023-12-27 RX ADMIN — CEFDINIR 300 MG: 300 CAPSULE ORAL at 08:06

## 2023-12-27 RX ADMIN — ENOXAPARIN SODIUM 40 MG: 40 INJECTION SUBCUTANEOUS at 08:07

## 2023-12-27 RX ADMIN — METOPROLOL SUCCINATE 25 MG: 25 TABLET, EXTENDED RELEASE ORAL at 08:06

## 2023-12-27 ASSESSMENT — ACTIVITIES OF DAILY LIVING (ADL)
ADLS_ACUITY_SCORE: 42

## 2023-12-27 NOTE — PLAN OF CARE
Problem: Adult Inpatient Plan of Care  Goal: Optimal Comfort and Wellbeing  Outcome: Progressing     Problem: Heart Failure  Goal: Stable Heart Rate and Rhythm  Outcome: Progressing   Goal Outcome Evaluation:    Pt comfortable over noc. Denies shortness of breath. Cpap with 4L o2 over noc. Falls precautions in place.

## 2023-12-27 NOTE — PLAN OF CARE
"  Problem: Adult Inpatient Plan of Care  Goal: Plan of Care Review  Description: The Plan of Care Review/Shift note should be completed every shift.  The Outcome Evaluation is a brief statement about your assessment that the patient is improving, declining, or no change.  This information will be displayed automatically on your shift  note.  Outcome: Adequate for Care Transition  Goal: Patient-Specific Goal (Individualized)  Description: You can add care plan individualizations to a care plan. Examples of Individualization might be:  \"Parent requests to be called daily at 9am for status\", \"I have a hard time hearing out of my right ear\", or \"Do not touch me to wake me up as it startles  me\".  Outcome: Adequate for Care Transition  Goal: Absence of Hospital-Acquired Illness or Injury  Outcome: Adequate for Care Transition  Intervention: Identify and Manage Fall Risk  Recent Flowsheet Documentation  Taken 12/27/2023 0900 by Charlee Dee RN  Safety Promotion/Fall Prevention: activity supervised  Intervention: Prevent Skin Injury  Recent Flowsheet Documentation  Taken 12/27/2023 0900 by Charlee Dee RN  Body Position: position changed independently  Goal: Optimal Comfort and Wellbeing  Outcome: Adequate for Care Transition  Goal: Readiness for Transition of Care  Outcome: Adequate for Care Transition     Problem: Pain Acute  Goal: Optimal Pain Control and Function  12/27/2023 1225 by Charlee Dee RN  Outcome: Adequate for Care Transition  12/27/2023 1120 by Charlee Dee RN  Outcome: Progressing  Intervention: Prevent or Manage Pain  Recent Flowsheet Documentation  Taken 12/27/2023 0900 by Charlee Dee RN  Medication Review/Management: medications reviewed     Problem: Heart Failure  Goal: Optimal Coping  12/27/2023 1225 by Charlee Dee RN  Outcome: Adequate for Care Transition  12/27/2023 1120 by Charlee Dee RN  Outcome: Progressing  Goal: Optimal Cardiac Output  12/27/2023 1225 by Charlee Dee" RN  Outcome: Adequate for Care Transition  12/27/2023 1120 by Charlee Dee RN  Outcome: Progressing  Goal: Stable Heart Rate and Rhythm  Outcome: Adequate for Care Transition  Goal: Optimal Functional Ability  Outcome: Adequate for Care Transition  Intervention: Optimize Functional Ability  Recent Flowsheet Documentation  Taken 12/27/2023 0900 by Charlee Dee RN  Activity Management: activity adjusted per tolerance  Goal: Fluid and Electrolyte Balance  Outcome: Adequate for Care Transition  Goal: Improved Oral Intake  Outcome: Adequate for Care Transition  Goal: Effective Oxygenation and Ventilation  Outcome: Adequate for Care Transition  Intervention: Promote Airway Secretion Clearance  Recent Flowsheet Documentation  Taken 12/27/2023 0900 by Charlee Dee RN  Activity Management: activity adjusted per tolerance  Intervention: Optimize Oxygenation and Ventilation  Recent Flowsheet Documentation  Taken 12/27/2023 0900 by Charlee Dee RN  Head of Bed (HOB) Positioning: HOB at 20-30 degrees  Goal: Effective Breathing Pattern During Sleep  Outcome: Adequate for Care Transition  Intervention: Monitor and Manage Obstructive Sleep Apnea  Recent Flowsheet Documentation  Taken 12/27/2023 0900 by Charlee Dee RN  Medication Review/Management: medications reviewed     Problem: UTI (Urinary Tract Infection)  Goal: Improved Infection Symptoms  Outcome: Adequate for Care Transition   Goal Outcome Evaluation:    Went over discharge orders with pt.  Pt waiting for portable oxygen to be delivered and ride home.

## 2023-12-27 NOTE — PROGRESS NOTES
I certify that this patient, Bess Enamorado has been under my care (or a nurse practitioner or physican's assistant working with me). This is the face-to-face encounter for oxygen medical necessity.      At the time of this encounter supplemental oxygen is reasonable and necessary and is expected to improve the patient's condition in a home setting.       Patient has continued oxygen desaturation due to Chronic Respiratory Failure with Hypoxia J96.11 and obesity hypoventilation.    If portability is ordered, is the patient mobile within the home? Yes         Patient has been assessed for Home Oxygen needs.      Pulse oximetry (SpO2) and Oxygen flow readings:     SpO2 = 90% on room air at rest while awake.     SpO2 improved to 95 (92%-95)% on 2 liters/minute at rest.     SpO2 = 87% on room air during activity/with exercise.     *SpO2 improved to 92 (91%-94% depending on activity)% on 3 liters/minute during activity/with exercise.        [facility] medicine service  Laxmi Madison MD

## 2023-12-27 NOTE — TELEPHONE ENCOUNTER
Home Care is calling regarding an established patient with [facility].       Requesting orders from: Mikala Luna  Provider is following patient: Yes  Is this a 60-day recertification request?  No    Orders Requested    Skilled Nursing  Request for initial evaluation and treatment (one time)   For cardiac and respiratory assessment    Verbal orders given.  Home Care will send orders for provider to sign.  Confirmed ok to leave a detailed message with call back.  Contact information confirmed and updated as needed.    Julie Behrendt, RN

## 2023-12-27 NOTE — PROGRESS NOTES
Physical Therapy Discharge Summary    Reason for therapy discharge:    Discharged to home with home PT.    Progress towards therapy goal(s). See goals on Care Plan in [facility] electronic health record for goal details.  Goals partially met.  Barriers to achieving goals:   discharge from facility.    Therapy recommendation(s):    Further recommended therapy is related to documented deficits, and is necessary to maximize functional independence in order for patient to return to prior level of function.      Liv Carey, KIN 12/27/2023

## 2023-12-27 NOTE — PROGRESS NOTES
Care Management Discharge Note    Discharge Date: 12/27/2023       Discharge Disposition: Home, Home Care - [facility] for PT/OT    Discharge Services: Home, Home Care - [facility] for PT/OT    Discharge DME: None    Discharge Transportation: family or friend will provide (transportation is an issue per pt)    Private pay costs discussed: Not applicable    Does the patient's insurance plan have a 3 day qualifying hospital stay waiver?  No    PAS Confirmation Code:  NA  Patient/family educated on Medicare website which has current facility and service quality ratings: yes    Education Provided on the Discharge Plan: Yes per team  Persons Notified of Discharge Plans: Patient per team  Patient/Family in Agreement with the Plan: yes    Handoff Referral Completed: Yes    Additional Information:  Patient discharge Home, Home Care - [facility] for PT/OT. Family will transport.    Amina Hernandez RN

## 2023-12-27 NOTE — PLAN OF CARE
Occupational Therapy Discharge Summary    Reason for therapy discharge:    Discharged to home with home therapy.    Progress towards therapy goal(s). See goals on Care Plan in [facility] electronic health record for goal details.  Goals met    Therapy recommendation(s):    Continued therapy is recommended.  Rationale/Recommendations:  to improve ADL independence.

## 2023-12-27 NOTE — PROGRESS NOTES
[facility]     Patient is anticipated to discharge TBD.  Patient agrees to have [facility] provide PT/OT-LYMPHEDEMA THERAPY services. Patient will receive a phone call from [facility] to schedule an initial home care visit. Anticipated start of care date is [within 3-4 days of discharge].     Please reach out to Clinical Liaison with questions.     KAROLINE Mills, LPN  [facility] Liaison   Cell: [phone]

## 2023-12-27 NOTE — DISCHARGE SUMMARY
"[facility]  Hospitalist Discharge Summary      Date of Admission:  12/21/2023  Date of Discharge:  12/27/2023  Discharging Provider: Laxmi Madison MD  Discharge Service: Hospitalist Service    Discharge Diagnoses     Principal Problem:    Acute on chronic diastolic congestive heart failure (H)  Active Problems:    Class 3 severe obesity due to excess calories with serious comorbidity and body mass index (BMI) of 50.0 to 59.9 in adult (H)    Benign essential hypertension    NARCISA on CPAP    Moderate episode of recurrent major depressive disorder (H)    Lymphedema of both lower extremities    Pyelonephritis, acute    Cellulitis of lower extremity, unspecified laterality    Acute on chronic respiratory failure with hypoxia and hypercapnia (H)    Obesity hypoventilation syndrome (H)       Clinically Significant Risk Factors     # Severe Obesity: Estimated body mass index is 55.85 kg/m  as calculated from the following:    Height as of this encounter: 1.626 m (5' 4\").    Weight as of this encounter: 147.6 kg (325 lb 6.4 oz).       Follow-ups Needed After Discharge   Follow-up Appointments     Follow-up and recommended labs and tests       Follow up with primary care provider, Mikala Luna, within 7 days to   evaluate medication change and for hospital follow- up.  No follow up labs   or test are needed. Follow up on chronic hypoxia and the needs for home   oxygen.          Discharge Disposition   Discharged to home  Condition at discharge: Stable    Hospital Course     Bess Enamorado is a 48 year old female admitted on 12/21/2023 for CHF exacerbation and acute pyelonephritis.     HFpEF exacerbation:  Patient presented with shortness of breath due to diuretic noncompliance. She normally takes bumex 3 mg daily. However, she stopped taking it for a few days prior to admission due to urinary symptoms from acute pyelonephritis.   Chest x-ray findings were consistent with pulmonary edema. Echocardiogram " showed LVEF 50-55% without significant valvular heart disease, similar to findings on 5/13/22. Patient received diuresis with IV Lasix. Her SOB resolved.      Acute on chronic hypoxemic respiratory failure:  Patient initially needed 4 LPM supplemental oxygen. It was able to get weaned down to 2 LPM. However, it can not be further tapered down. Patient's acute respiratory failure is due to CHF exacerbation. However, she does have chronic hypoxia. Patient reports that when she checked her oxygen saturation at home, often it is as low as 87 on room air. D dimer was only borderline elevated and pulmonary embolism is unlikely. Per chart review, patient was placed on home oxygen back in 2021 after hospitalization due to NARCISA and obesity hypoventilation. Patient reports that she eventually got weaned off. Home oxygen evaluation was done and patient qualifies for home oxygen.     Acute right pyelonephritis:  Patient presented with urinary urgency and right flank pain for 4 days. CT scan findings were suggestive of ascending UTI. No urolithiasis or hydronephrosis. Urine culture showed E coli. Patient was treated with Ceftriaxone and Cefdinir. Urinary symptoms resolved.      Bilateral lower extremity cellulitis:  Patient noticed that her bilateral inner thigh had become more erythematous. She was treated with Ceftriaxone and the erythema resolved. MRSA screen was negative.    NARCISA: on CPAP     Major depression: No active issues. Continue PTA Zoloft   Bilateral lymphedema: on lymphedema wrap.    Obesity: BMI 59.83.       Consultations This Hospital Stay   PHYSICAL THERAPY ADULT IP CONSULT  OCCUPATIONAL THERAPY ADULT IP CONSULT    Code Status   Full Code    Time Spent on this Encounter   I, Laxmi Madison MD, personally saw the patient today and spent greater than 30 minutes discharging this patient.       Laxmi Madison MD  [facility]  [address] [place] [id number]  Phone:  [phone]  Fax: [phone]  ______________________________________________________________________    Physical Exam   Vital Signs: Temp: 97.7  F (36.5  C) Temp src: Axillary BP: 110/65 Pulse: 70   Resp: 20 SpO2: 94 % O2 Device: BiPAP/CPAP Oxygen Delivery: 4 LPM  Weight: 325 lbs 6.38 oz    General appearance: not in acute distress  HEENT: PERRL, EOMI  Lungs: Clear breath sounds in bilateral lung fields  Cardiovascular: Regular rate and rhythm, normal S1-S2  Abdomen: Soft, non tender, no distension.   Musculoskeletal: No joint swelling  Skin: No erythema and no swelling. The lower extremity erythema and swelling on admission have resolved.   Neurology: AAO ×3.  Cranial nerves II - XII normal.  Normal muscle strength in all four extremities.           Primary Care Physician   Mikala Luna    Discharge Orders      Home Care Referral      Reason for your hospital stay    Hypoxia and pyelonephritis     Follow-up and recommended labs and tests     Follow up with primary care provider, Mikala Luna, within 7 days to evaluate medication change and for hospital follow- up.  No follow up labs or test are needed. Follow up on chronic hypoxia and the needs for home oxygen.     Activity    Your activity upon discharge: activity as tolerated     Oxygen Adult/Peds    Oxygen Documentation  I certify that this patient, Bess Enamorado has been under my care (or a nurse practitioner or physican's assistant working with me). This is the face-to-face encounter for oxygen medical necessity.      At the time of this encounter, I have reviewed the qualifying testing and have determined that supplemental oxygen is reasonable and necessary and is expected to improve the patient's condition in a home setting.       Patient has continued oxygen desaturation due to Chronic Respiratory Failure with Hypoxia J96.11 and obesity hypoventilation.    If portability is ordered, is the patient mobile within the home? yes     Diet    Follow this  diet upon discharge: Orders Placed This Encounter      Room Service      Low Saturated Fat Na <2400 mg       Significant Results and Procedures   Most Recent 3 CBC's:  Recent Labs   Lab Test 12/27/23  0444 12/24/23  0517 12/21/23  1324 09/27/23  1631 05/11/22  1455   WBC  --   --  5.8 5.9 8.5   HGB  --   --  14.4 15.2 12.4   MCV  --   --  85 86 86    220 188 210 206     Most Recent 3 BMP's:  Recent Labs   Lab Test 12/27/23  0444 12/26/23  0505 12/25/23  0408    138 138   POTASSIUM 3.6 3.7 3.5   CHLORIDE 101 101 102   CO2 30* 27 28   BUN 15.5 14.2 14.7   CR 0.75 0.77 0.80   ANIONGAP 7 10 8   EVITA 9.8 9.6 9.6   GLC 92 95 94       EXAM: XR CHEST 2 VIEWS  LOCATION: [facility]  DATE: 12/21/2023     INDICATION: Dyspnea  COMPARISON: Chest radiograph 05/02/2022, 03/23/2022                                                                    IMPRESSION: Lung volumes are low with patchy bilateral and bibasilar opacities, favored to represent edema and/or atelectasis, less likely infection. No pleural effusion or pneumothorax. Unchanged cardiomegaly and prominence of the pulmonary vasculature/candace.      EXAM: CT ABDOMEN PELVIS W/O CONTRAST  LOCATION: [facility]  DATE: 12/21/2023     INDICATION: Right flank pain  COMPARISON: CT abdomen pelvis 04/06/2022, 02/21/2022  TECHNIQUE: CT scan of the abdomen and pelvis was performed without IV contrast. Multiplanar reformats were obtained. Dose reduction techniques were used.  CONTRAST: None.     FINDINGS:   LOWER CHEST: No worrisome lung nodules or consolidation in the visualized lung bases. No pleural or pericardial effusion. Mild cardiomegaly with mitral annular calcifications.     HEPATOBILIARY: Hepatic steatosis. Cholecystectomy. No gross biliary ductal dilation.     PANCREAS: Normal.     SPLEEN: Normal.     ADRENAL GLANDS: Normal.     KIDNEYS/BLADDER: Normal unenhanced renal contours. No hydronephrosis, though there is  periureteral and pericystic stranding with bladder wall thickening.     BOWEL: No bowel obstruction. Normal appendix. No evidence of diverticulitis. No ascites.     LYMPH NODES: Prominent bilateral iliac and retroperitoneal lymph nodes, likely reactive.     VASCULATURE: Unremarkable.     PELVIC ORGANS: Uterus is present. Unchanged 9 cm benign left ovarian dermoid (which requires no follow-up).     MUSCULOSKELETAL: No aggressive osseous lesions. Diffuse body wall edema.                                                                      IMPRESSION:   1.  Pericystic and bilateral ureteral stranding as well as bladder wall thickening, suspicious for cystitis/ascending urinary tract infection. No urolithiasis or hydronephrosis.  2.  Prominent bilateral iliac and retroperitoneal lymph nodes, which are favored to be reactive.  3.  Hepatic steatosis.    EXAM: XR CHEST PORT 1 VIEW  LOCATION: [facility]  DATE: 12/26/2023     INDICATION: persistent hypoxia  COMPARISON: 12/21/2023                                                                     IMPRESSION: Patchy bibasilar atelectasis. No new focal consolidation or effusion. Cardiomegaly without overt pulmonary edema. Mild elevation of the right hemidiaphragm.       Discharge Medications   Current Discharge Medication List        CONTINUE these medications which have NOT CHANGED    Details   acetaminophen (TYLENOL) 650 MG CR tablet Take 1,300 mg by mouth once as needed for mild pain or fever      bumetanide (BUMEX) 1 MG tablet Take 3 tablets (3 mg) by mouth daily  Qty: 270 tablet, Refills: 3    Associated Diagnoses: Chronic diastolic congestive heart failure (H)      metoprolol succinate ER (TOPROL XL) 25 MG 24 hr tablet Take 1 tablet (25 mg) by mouth daily  Qty: 90 tablet, Refills: 3    Associated Diagnoses: Benign essential hypertension; Sinus tachycardia      miconazole (MICATIN) 2 % AERP powder Apply topically 2 times daily as needed       sertraline (ZOLOFT) 100 MG tablet Take 1 tablet (100 mg) by mouth daily  Qty: 90 tablet, Refills: 3    Associated Diagnoses: Moderate episode of recurrent major depressive disorder (H); Generalized anxiety disorder      ACE/ARB/ARNI NOT PRESCRIBED (INTENTIONAL) Please choose reason not prescribed from choices below.    Associated Diagnoses: Chronic diastolic congestive heart failure (H)           STOP taking these medications       ASPIRIN NOT PRESCRIBED (INTENTIONAL) Comments:   Reason for Stopping:             Allergies   Allergies   Allergen Reactions    Nkda [No Known Drug Allergy]

## 2023-12-27 NOTE — PROGRESS NOTES
[facility]    Medicine Progress Note - Hospitalist Service    Date of Admission:  12/21/2023    Assessment & Plan      Bess Enamorado is a 48 year old female admitted on 12/21/2023 for CHF exacerbation and acute pyelonephritis.    HFpEF exacerbation:  Presented with shortness of breath due to diuretic noncompliance. PTA bumex 3 mg daily. She stopped taking it due to urinary symptoms from acute pyelonephritis.   Checks x-ray findings are consistent with pulmonary edema.  Echocardiogram showed LVEF 50-55% without significant valvular heart disease, similar to findings on 5/13/22.  - Received IV Lasix 40 mg bid. Now switched back to her home dose Bumex 3 mg daily.   - Continue PTA metoprolol  - Intake and output  - Daily weight  - Monitor electrolytes and creatinine while on diuresis.    Acute on chronic hypoxemic respiratory failure  Acute hypoxia due to CHF exacerbation. Initially needed 4 LPM supplemental oxygen and now weaned down to 3 LPM. However, it can not be further tapered down. Per chart review, patient was placed on home oxygen back in 2021 after hospitalization due to NARCISA and obesity hypoventilation. Patient reports that she eventually got weaned off.  - Continue to wean as able.   - Will need to rue out PE. Will do d dimer  - Repeat chest XR  - Plan to discharge home with home oxygen.     Acute right pyelonephritis:  Presents with urinary urgency and right flank pain for 4 days.  CT scan findings suggestive of ascending UTI. No urolithiasis or hydronephrosis.   - On ceftriaxone since admission. Urine culture shows E coli. Urinary symptoms have now resolved. Switched to Cefdinir today to complete 7 day antibiotic.     Bilateral lower extremity cellulitis:  Patient notices that her bilateral inner thigh have become more erythematous recently.  - Received Ceftriaxone and the erythema has now resolved. Continue antibiotics as above. MRSA screen negative.    Major depression: No active  "issues. Continue PTA Zoloft    Bilateral lymphedema: on lymphedema wrap.     Obesity: BMI 59.83.           Diet: Low Saturated Fat Na <2400 mg  Room Service    DVT Prophylaxis: Enoxaparin (Lovenox) SQ  Brar Catheter: Not present  Lines: None     Cardiac Monitoring: None  Code Status: Full Code      Clinically Significant Risk Factors                  # Hypertension: Noted on problem list  # Acute heart failure with preserved ejection fraction: heart failure noted on problem list, last echo with EF >50%, and receiving IV diuretics       # Severe Obesity: Estimated body mass index is 55.97 kg/m  as calculated from the following:    Height as of this encounter: 1.626 m (5' 4\").    Weight as of this encounter: 147.9 kg (326 lb 1 oz).        # Financial/Environmental Concerns: none         Disposition Plan      Expected Discharge Date: 12/27/2023      Destination: home  Discharge Comments: iv lasix, iv abx  plans to go home and does NOT want home care (12/24)            Laxmi Madison MD  Hospitalist Service  [facility]  Securely message with [employer] (more info)  Text page via [employer] Paging/Directory   ______________________________________________________________________    Interval History   Patient's oxygen needs increased to 5 LPM yesterday. She also needs supplemental oxygen 4 LPM with the CPAP. However, patient reports that she feels the same, no chest pain and SOB.     Physical Exam   Vital Signs: Temp: 97.7  F (36.5  C) Temp src: Oral BP: 108/64 Pulse: 83   Resp: 20 SpO2: 94 % O2 Device: Nasal cannula Oxygen Delivery: 2 LPM  Weight: 326 lbs .97 oz    General appearance: not in acute distress  HEENT: PERRL, EOMI  Lungs: Clear breath sounds in bilateral lung fields  Cardiovascular: Regular rate and rhythm, normal S1-S2  Abdomen: Soft, non tender, no distension.   Musculoskeletal: No joint swelling  Skin: No erythema and no swelling. The previous lower extremity erythema and swelling have " resolved.   Neurology: AAO ×3.  Cranial nerves II - XII normal.  Normal muscle strength in all four extremities.     Medical Decision Making       40 MINUTES SPENT BY ME on the date of service doing chart review, history, exam, documentation & further activities per the note.      Data     I have personally reviewed the following data over the past 24 hrs:    N/A  \   N/A   / N/A     138 101 14.2 /  95   3.7 27 0.77 \     INR:  N/A PTT:  N/A   D-dimer:  0.73 (H) Fibrinogen:  N/A       Imaging results reviewed over the past 24 hrs:   Recent Results (from the past 24 hour(s))   XR Chest Port 1 View    Narrative    EXAM: XR CHEST PORT 1 VIEW  LOCATION: [facility]  DATE: 12/26/2023    INDICATION: persistent hypoxia  COMPARISON: 12/21/2023       Impression    IMPRESSION: Patchy bibasilar atelectasis. No new focal consolidation or effusion. Cardiomegaly without overt pulmonary edema. Mild elevation of the right hemidiaphragm.

## 2023-12-27 NOTE — PLAN OF CARE
Problem: Pain Acute  Goal: Optimal Pain Control and Function  Outcome: Progressing  Intervention: Prevent or Manage Pain  Recent Flowsheet Documentation  Taken 12/27/2023 0900 by Charlee Dee RN  Medication Review/Management: medications reviewed     Problem: Heart Failure  Goal: Optimal Coping  Outcome: Progressing  Goal: Optimal Cardiac Output  Outcome: Progressing  Goal: Optimal Functional Ability  Intervention: Optimize Functional Ability  Recent Flowsheet Documentation  Taken 12/27/2023 0900 by Charlee Dee RN  Activity Management: activity adjusted per tolerance  Goal: Effective Oxygenation and Ventilation  Intervention: Promote Airway Secretion Clearance  Recent Flowsheet Documentation  Taken 12/27/2023 0900 by Charlee Dee RN  Activity Management: activity adjusted per tolerance  Intervention: Optimize Oxygenation and Ventilation  Recent Flowsheet Documentation  Taken 12/27/2023 0900 by Charlee Dee RN  Head of Bed (HOB) Positioning: HOB at 20-30 degrees  Goal: Effective Breathing Pattern During Sleep  Intervention: Monitor and Manage Obstructive Sleep Apnea  Recent Flowsheet Documentation  Taken 12/27/2023 0900 by Charlee Dee RN  Medication Review/Management: medications reviewed   Goal Outcome Evaluation:    No pain.  NSR BBB.  On oxygen 2L 94%.  Purwick intact output 700.

## 2023-12-28 ENCOUNTER — PATIENT OUTREACH (OUTPATIENT)
Dept: CARE COORDINATION | Facility: CLINIC | Age: 48
End: 2023-12-28
Payer: MEDICARE

## 2023-12-28 ENCOUNTER — MEDICAL CORRESPONDENCE (OUTPATIENT)
Dept: HEALTH INFORMATION MANAGEMENT | Facility: CLINIC | Age: 48
End: 2023-12-28

## 2023-12-28 ENCOUNTER — TELEPHONE (OUTPATIENT)
Dept: FAMILY MEDICINE | Facility: CLINIC | Age: 48
End: 2023-12-28
Payer: MEDICARE

## 2023-12-28 NOTE — TELEPHONE ENCOUNTER
Called and spoke with barbra from home care. She was informed orders were approved per provider and verbalized understanding.    KINDRA Cates.

## 2023-12-28 NOTE — TELEPHONE ENCOUNTER
Home Care is calling regarding an established patient with [facility].    Requesting orders from: Mikala Luna  Provider is following patient: Yes  Is this a 60-day recertification request?  No    Orders Requested    Skilled Nursing  Request for initial certification (first set of orders)   Frequency: 1 x/wk on weeks 1, 3, 4, 5, 7, 9 and 2 x/wk on week 2 for diease education and medication management.     Physical Therapy  Request for initial evaluation and treatment (one time)     Occupational Therapy  Request for initial evaluation and treatment (one time)     Social Work  Request for initial evaluation and treatment (one time)     Verbal orders given for PT, OT, MSW.  Information was gathered for SN.  Provider review needed.  RN will contact Home Care with information after provider review.  Confirmed ok to leave a detailed message with call back.  Contact information confirmed and updated as needed.    Julie Behrendt, RN

## 2023-12-28 NOTE — PROGRESS NOTES
Clinic Care Coordination Contact  [facility]/Voicemail      12/21/2023 - 12/27/2023 (6 days)  [facility]  Clinical Data: Care Coordinator Outreach    Outreach Documentation Number of Outreach Attempt   12/28/2023   8:58 AM 1     Acute on chronic diastolic congestive heart failure (H)    Pyelonephritis, acute    Cellulitis of lower extremity, unspecified laterality    Acute on chronic respiratory failure with hypoxia and hypercapnia (H)    Left message on patient's voicemail with call back information and requested return call.    Plan: . Care Coordinator will try to reach patient again in 1-2 business days.    [facility]   Meenakshi Campbell RN, Care Coordinator   [facility]'s   E-mail [email or web address]   [phone]

## 2023-12-29 NOTE — PROGRESS NOTES
Clinic Care Coordination Contact  Clinic Care Coordination Contact  OUTREACH    Referral Information:  Referral Source: IP Handoff    Primary Diagnosis: CHF    Chief Complaint   Patient presents with    Clinic Care Coordination - Post Hospital     Clinic Care Coordination RN          Universal Utilization: [facility] admission 12/21-12/27/2023  Acute on chronic diastolic congestive heart failure (H)    Pyelonephritis, acute    Cellulitis of lower extremity, unspecified laterality    Acute on chronic respiratory failure with hypoxia and hypercapnia (H)  Clinic Utilization  Difficulty keeping appointments:: No  Compliance Concerns: No  No-Show Concerns: No  No PCP office visit in Past Year: No  Utilization      No Show Count (past year)  0             ED Visits  1             Hospital Admissions  1                    Current as of: 12/28/2023  9:08 PM                Clinical Concerns:  Current Medical Concerns:  Brief conversation today with the patient.  Patient reports she had a home care visit yesterday and reviewed the discharge medications and discharge instructions  No questions.      Current Behavioral Concerns: No    Education Provided to patient: CC RN introduced role    Pain  Pain (GOAL):: No  Health Maintenance Reviewed: Not assessed  Clinical Pathway: Clinic Care Coordination CHF Assessment    Discharge:  Day of hospital discharge: 12/27/23  What recommendations were made for follow up after your recent hospitalization? PCP visit within 7-10 days   Have the follow up appointments been scheduled? No Patient will make a hospital follow up with PCP   If not, can I help you set up these appointments? No       CHF:    Home scale available:  Yes  Home scale weight this morning: Patient reports she participated in a program with her Cardiology clinic and checked her weight but this created a lot of anxiety and so she no longer checks her weight .  Patient has Lymphedema and uses daily wraps   Any increased  SOB since hospital discharge:  No  Any increased edema since hospital discharge:  No  What number to call for YELLOW zones: [phone]    Symptom Review:   Respiratory  Shortness of breath:: No  Wheezing or noisy breathing?: No  Cough: No     Cardiovascular  Chest pain: : No  Heartbeat: Regular  Fatigue: No  Weakness (Heaviness in limbs):: Yes  Diet/Education/Medication  Medication adherence problem (GOAL):: No  Medication side effects suspected:: No        Patient Perception of Heart Failure  What Heart Failure zone are you currently in?: Green  Overall your CHF symptoms are (GOAL):: Improving    Is patient on Warfarin?  No  Is Ejection Fraction <40%: No              Medication Management:  Medication review status:   Reviewed by home care RN yesterday and no further questions      Functional Status:       Living Situation:  Current living arrangement:: I live in a private home    Lifestyle & Psychosocial Needs:    Social Determinants of Health     Food Insecurity: Low Risk  (9/25/2023)    Food Insecurity     Within the past 12 months, did you worry that your food would run out before you got money to buy more?: No     Within the past 12 months, did the food you bought just not last and you didn t have money to get more?: No   Depression: At risk (10/19/2023)    PHQ-2     PHQ-2 Score: 3   Housing Stability: Low Risk  (9/25/2023)    Housing Stability     Do you have housing? : Yes     Are you worried about losing your housing?: No   Tobacco Use: Low Risk  (12/21/2023)    Patient History     Smoking Tobacco Use: Never     Smokeless Tobacco Use: Never     Passive Exposure: Not on file   Financial Resource Strain: Low Risk  (9/25/2023)    Financial Resource Strain     Within the past 12 months, have you or your family members you live with been unable to get utilities (heat, electricity) when it was really needed?: No   Alcohol Use: Not on file   Transportation Needs: High Risk (9/25/2023)    Transportation Needs      Within the past 12 months, has lack of transportation kept you from medical appointments, getting your medicines, non-medical meetings or appointments, work, or from getting things that you need?: Yes   Physical Activity: Inactive (3/1/2022)    Exercise Vital Sign     Days of Exercise per Week: 0 days     Minutes of Exercise per Session: 0 min   Interpersonal Safety: Low Risk  (9/27/2023)    Interpersonal Safety     Do you feel physically and emotionally safe where you currently live?: Yes     Within the past 12 months, have you been hit, slapped, kicked or otherwise physically hurt by someone?: No     Within the past 12 months, have you been humiliated or emotionally abused in other ways by your partner or ex-partner?: No   Stress: No Stress Concern Present (12/4/2020)    Citizen of [place] [employer] of Occupational Health - Occupational Stress Questionnaire     Feeling of Stress : Only a little   Social Connections: Unknown (3/1/2022)    Social Connection and Isolation Panel [NHANES]     Frequency of Communication with Friends and Family: Twice a week     Frequency of Social Gatherings with Friends and Family: Twice a week     Attends Gnosticism Services: Not on file     Active Member of Clubs or Organizations: Not on file     Attends Club or Organization Meetings: Not on file     Marital Status: Not on file     Diet:: No added salt  Inadequate nutrition (GOAL):: No  Tube Feeding: No  Inadequate activity/exercise (GOAL):: No  Significant changes in sleep pattern (GOAL): No        Mental health DX:: Yes  Mental health DX how managed:: Medication  Mental health management concern (GOAL):: No  Chemical Dependency Status: No Current Concerns  Informal Support system:: Friends, Family             Resources and Interventions:  Current Resources:   Skilled Home Care Services: Skilled Nursing, Home Health Aid, Physical Therapy  Community Resources: Home Care  Supplies Currently Used at Home: Oxygen Tubing/Supplies                Advance Care Plan/Directive  Advanced Care Plans/Directives on file:: No    Referrals Placed: None        Patient/Caregiver understanding: patient expresses good understanding of discharge instructions     Outreach Frequency: weekly, more frequently as needed      Plan:   Patient will continue home care services   Patient will call and make a hospital follow up  appointment with her PCP   No unmet needs, no further care coordination is needed at this time      [facility]   Meenakshi Campbell RN, Care Coordinator   [facility]'s   E-mail [email or web address]   [phone]

## 2024-01-03 ENCOUNTER — TELEPHONE (OUTPATIENT)
Dept: FAMILY MEDICINE | Facility: CLINIC | Age: 49
End: 2024-01-03
Payer: MEDICARE

## 2024-01-03 NOTE — TELEPHONE ENCOUNTER
Home Health Care    Reason for call:  Denice calling for verbal orders     Orders are needed for this patient.  PT: 1 weekly visit for 8 weeks    Asking for orders to ween patient off of oxygen     Pt Provider: Mikala Luna MD      Phone Number Homecare Nurse can be reached at: [phone]

## 2024-01-04 NOTE — TELEPHONE ENCOUNTER
Okay for orders as requested.      Okay to gradually wean down on oxygen by 0.5-1 L at a time.  Maintain oxygen saturation >92%    Mikala Luna M.D.

## 2024-01-09 NOTE — PROGRESS NOTES
02/23/22 1328   Quick Adds   Type of Visit Initial PT Evaluation       Present no   Living Environment   People in Home alone   Current Living Arrangements   (town home-1 level)   Home Accessibility other (see comments)  (1 steps, but has portable ramp into home )   Living Environment Comments was going to have home care    Self-Care   Usual Activity Tolerance good   Equipment Currently Used at Home walker, rolling;wheelchair, manual;grab bar, toilet;grab bar, tub/shower;shower chair  (walk in  shower, lift chair, commode )   Activity/Exercise/Self-Care Comment normally I ADL/ IADLS after hospital stay had family/friends setup for housework and PCA   General Information   Onset of Illness/Injury or Date of Surgery 02/20/22   Referring Physician dr Laxmi Madison   Patient/Family Therapy Goals Statement (PT) return home    Pertinent History of Current Problem (include personal factors and/or comorbidities that impact the POC) admit dehydration, decreased urine output   Existing Precautions/Restrictions other (see comments)  (in ICU)   General Observations pt in bed   Cognition   Orientation Status (Cognition) oriented x 4   Range of Motion (ROM)   ROM Comment LLE limited - pt reports LLE more affected by MS    Strength (Manual Muscle Testing)   Strength Comments limited LLE    Bed Mobility   Comment, (Bed Mobility) declined OOB due to discomfort with catheter    Clinical Impression   Criteria for Skilled Therapeutic Intervention Yes, treatment indicated   PT Diagnosis (PT) decreased mobility    Influenced by the following impairments weakness,   Functional limitations due to impairments decreased bed mobility and transfers    Clinical Presentation (PT Evaluation Complexity) Stable/Uncomplicated   Clinical Presentation Rationale per medical diagnosis   Clinical Decision Making (Complexity) moderate complexity   Planned Therapy Interventions (PT) bed mobility training;transfer training;gait  Patient reports general noncompliance with hip precautions prior to admission despite instruction from the hospital and at admission. pt repeatedly required vcs to maintain precautions. extensive education performed on admission of hip precautions and use of reacher. pt also had detached bottom of cord of wound vac prior to this therapists arrival and then reattached the clip herself as the therapist got there. pt was not able to recall  how long the cord had been detached. pt instructed on wound vac care, signs and symptoms to report with no further questions noted. pt refused transfer assessment at initial evaluation altogether as she stated she had just got up and was not doing it again. pt reported that she had transferred to her bed last night without using walker but pt is 50% wb rle. pt instructed on wb status, instructed on the need to utilize ad for safe trans fers, and the need to comply with dicharge intsructions and orders with pt stating understanding.  training;strengthening   Anticipated Equipment Needs at Discharge (PT)   (pt has FWW and w/c at home )   Risk & Benefits of therapy have been explained evaluation/treatment results reviewed   PT Discharge Planning   PT Discharge Recommendation (DC Rec) home with assist;home with home care physical therapy  (pending mobility )   Total Evaluation Time   Total Evaluation Time (Minutes) 10   Physical Therapy Goals   PT Frequency Daily   PT Predicated Duration/Target Date for Goal Attainment 02/28/22   PT: Bed Mobility Supervision/stand-by assist   PT: Transfers Supervision/stand-by assist;Maximum assist;Sit to/from stand;Assistive device   PT: Gait Supervision/stand-by assist;Rolling walker;25 feet

## 2024-01-11 ENCOUNTER — TELEPHONE (OUTPATIENT)
Dept: FAMILY MEDICINE | Facility: CLINIC | Age: 49
End: 2024-01-11
Payer: MEDICARE

## 2024-01-11 NOTE — TELEPHONE ENCOUNTER
Alexandrea out of [facility] Home care OT (call back is [phone]) requesting the following orders:        Home Care is calling regarding an established patient with [facility].       Requesting orders from: Mikala Luna  Provider is following patient: Yes  Is this a 60-day recertification request?  No    Orders Requested    Occupational Therapy  Request for initial certification (first set of orders)   Frequency:  one visit per week for 2 weeks, then once every other week for 4 weeks.    Eleanor Rodriguez RN

## 2024-02-06 ASSESSMENT — PATIENT HEALTH QUESTIONNAIRE - PHQ9
SUM OF ALL RESPONSES TO PHQ QUESTIONS 1-9: 4
SUM OF ALL RESPONSES TO PHQ QUESTIONS 1-9: 4
10. IF YOU CHECKED OFF ANY PROBLEMS, HOW DIFFICULT HAVE THESE PROBLEMS MADE IT FOR YOU TO DO YOUR WORK, TAKE CARE OF THINGS AT HOME, OR GET ALONG WITH OTHER PEOPLE: SOMEWHAT DIFFICULT

## 2024-02-07 ENCOUNTER — OFFICE VISIT (OUTPATIENT)
Dept: FAMILY MEDICINE | Facility: CLINIC | Age: 49
End: 2024-02-07
Payer: MEDICARE

## 2024-02-07 ENCOUNTER — LAB (OUTPATIENT)
Dept: LAB | Facility: CLINIC | Age: 49
End: 2024-02-07
Payer: MEDICARE

## 2024-02-07 VITALS
DIASTOLIC BLOOD PRESSURE: 70 MMHG | RESPIRATION RATE: 22 BRPM | SYSTOLIC BLOOD PRESSURE: 128 MMHG | TEMPERATURE: 97.3 F | HEART RATE: 90 BPM

## 2024-02-07 DIAGNOSIS — E66.2 OBESITY HYPOVENTILATION SYNDROME (H): ICD-10-CM

## 2024-02-07 DIAGNOSIS — G47.33 OSA ON CPAP: ICD-10-CM

## 2024-02-07 DIAGNOSIS — I89.0 LYMPHEDEMA OF BOTH LOWER EXTREMITIES: ICD-10-CM

## 2024-02-07 DIAGNOSIS — E66.813 CLASS 3 SEVERE OBESITY DUE TO EXCESS CALORIES WITH SERIOUS COMORBIDITY AND BODY MASS INDEX (BMI) OF 50.0 TO 59.9 IN ADULT (H): ICD-10-CM

## 2024-02-07 DIAGNOSIS — I50.32 CHRONIC HEART FAILURE WITH PRESERVED EJECTION FRACTION (H): Primary | ICD-10-CM

## 2024-02-07 DIAGNOSIS — I50.32 CHRONIC HEART FAILURE WITH PRESERVED EJECTION FRACTION (H): ICD-10-CM

## 2024-02-07 DIAGNOSIS — E66.01 CLASS 3 SEVERE OBESITY DUE TO EXCESS CALORIES WITH SERIOUS COMORBIDITY AND BODY MASS INDEX (BMI) OF 50.0 TO 59.9 IN ADULT (H): ICD-10-CM

## 2024-02-07 DIAGNOSIS — G35 MULTIPLE SCLEROSIS (H): Chronic | ICD-10-CM

## 2024-02-07 LAB
ANION GAP SERPL CALCULATED.3IONS-SCNC: 10 MMOL/L (ref 7–15)
BUN SERPL-MCNC: 11.7 MG/DL (ref 6–20)
CALCIUM SERPL-MCNC: 9.7 MG/DL (ref 8.6–10)
CHLORIDE SERPL-SCNC: 100 MMOL/L (ref 98–107)
CREAT SERPL-MCNC: 0.71 MG/DL (ref 0.51–0.95)
DEPRECATED HCO3 PLAS-SCNC: 27 MMOL/L (ref 22–29)
EGFRCR SERPLBLD CKD-EPI 2021: >90 ML/MIN/1.73M2
GLUCOSE SERPL-MCNC: 81 MG/DL (ref 70–99)
POTASSIUM SERPL-SCNC: 3.6 MMOL/L (ref 3.4–5.3)
SODIUM SERPL-SCNC: 137 MMOL/L (ref 135–145)

## 2024-02-07 PROCEDURE — 99213 OFFICE O/P EST LOW 20 MIN: CPT | Performed by: FAMILY MEDICINE

## 2024-02-07 PROCEDURE — 80048 BASIC METABOLIC PNL TOTAL CA: CPT

## 2024-02-07 PROCEDURE — 36415 COLL VENOUS BLD VENIPUNCTURE: CPT

## 2024-02-07 RX ORDER — ASPIRIN 81 MG/1
81 TABLET ORAL DAILY
Start: 2024-02-07

## 2024-02-07 ASSESSMENT — PAIN SCALES - GENERAL: PAINLEVEL: NO PAIN (0)

## 2024-02-07 NOTE — PROGRESS NOTES
Assessment & Plan     Chronic heart failure with preserved ejection fraction (H)  Recommend [facility]  Unfortunately patient has a very difficult time getting transportation to visits - is in wheelchair and hard to find someone to transport safely    - Adult Cardiology Yaneli Mcduffie Referral; Future  - aspirin (ASPIRIN LOW DOSE) 81 MG EC tablet; Take 1 tablet (81 mg) by mouth daily  - Basic metabolic panel  (Ca, Cl, CO2, Creat, Gluc, K, Na, BUN); Future    Obesity hypoventilation syndrome (H)  Is on home oxygen now at 1 L nc.  At that sats in the low 90s at rest and mid 90s when sitting upright.     Lymphedema of both lower extremities  Has PT/OT seeing her at home  Continue compression garments and lymphedema therapy    Class 3 severe obesity due to excess calories with serious comorbidity and body mass index (BMI) of 50.0 to 59.9 in adult (H)   Contributes to overall risk     NARCISA on CPAP       Multiple sclerosis (H)  Not currently seeing neurology, unfortunately.           MED REC REQUIRED  Post Medication Reconciliation Status: discharge medications reconciled, continue medications without change        Subjective   Bess is a 48 year old, presenting for the following health issues:  Heart Failure        2/7/2024     1:53 PM   Additional Questions   Roomed by Vero abraahm CMA   Accompanied by Self     HPI         Hospital Follow-up Visit:    Hospital/Nursing Home/IP Rehab Facility: [facility]  Date of Admission: 12/21/2023  Date of Discharge: 12/27/2023  Reason(s) for Admission: Acute on chronic diastolic congestive heart failure    Was your hospitalization related to COVID-19? No   Problems taking medications regularly:  None  Medication changes since discharge: Aspirin was discontinued  Problems adhering to non-medication therapy:  None    Summary of hospitalization:  [facility] discharge summary reviewed  Diagnostic Tests/Treatments reviewed.  Follow up needed:  none  Other Healthcare Providers Involved in Patient s Care:         Homecare  Update since discharge: She is william PT, OT and RN to home once weekly. No current chest pain. She notes a few episodes of dizziness that last only a second. Edema fluctuates depending on the day.          Plan of care communicated with patient         Hospital Course  Bess Enamorado is a 48 year old female admitted on 12/21/2023 for CHF exacerbation and acute pyelonephritis.      HFpEF exacerbation:  Patient presented with shortness of breath due to diuretic noncompliance. She normally takes bumex 3 mg daily. However, she stopped taking it for a few days prior to admission due to urinary symptoms from acute pyelonephritis.   Chest x-ray findings were consistent with pulmonary edema. Echocardiogram showed LVEF 50-55% without significant valvular heart disease, similar to findings on 5/13/22. Patient received diuresis with IV Lasix. Her SOB resolved.      Acute on chronic hypoxemic respiratory failure:  Patient initially needed 4 LPM supplemental oxygen. It was able to get weaned down to 2 LPM. However, it can not be further tapered down. Patient's acute respiratory failure is due to CHF exacerbation. However, she does have chronic hypoxia. Patient reports that when she checked her oxygen saturation at home, often it is as low as 87 on room air. D dimer was only borderline elevated and pulmonary embolism is unlikely. Per chart review, patient was placed on home oxygen back in 2021 after hospitalization due to NARCISA and obesity hypoventilation. Patient reports that she eventually got weaned off. Home oxygen evaluation was done and patient qualifies for home oxygen.      Acute right pyelonephritis:  Patient presented with urinary urgency and right flank pain for 4 days. CT scan findings were suggestive of ascending UTI. No urolithiasis or hydronephrosis. Urine culture showed E coli. Patient was treated with Ceftriaxone and Cefdinir. Urinary  symptoms resolved.      Bilateral lower extremity cellulitis:  Patient noticed that her bilateral inner thigh had become more erythematous. She was treated with Ceftriaxone and the erythema resolved. MRSA screen was negative.     NARCISA: on CPAP     Major depression: No active issues. Continue PTA Zoloft   Bilateral lymphedema: on lymphedema wrap.    Obesity: BMI 59.83.          overall feels stable  Using oxygen at 1 L nc continuously at home, did not bring to clinic with her today    Denies chest pain/pressure  No redness in her legs  Has lymphedema garments on currently    Review of Systems  Constitutional, HEENT, cardiovascular, pulmonary, gi and gu systems are negative, except as otherwise noted.      Objective    /70   Pulse 90   Temp 97.3  F (36.3  C) (Tympanic)   Resp 22   LMP 12/07/2023   There is no height or weight on file to calculate BMI.  Physical Exam   GENERAL: alert, no distress, and morbidly obese, in w/c  NECK: no adenopathy, no asymmetry, masses, or scars  RESP: lungs clear to auscultation - no rales, rhonchi or wheezes  CV: regular rate and rhythm, normal S1 S2, no S3 or S4, no murmur, click or rub, no peripheral edema  ABDOMEN: soft, nontender, no hepatosplenomegaly, no masses and bowel sounds normal  MS: marked lymphedema of both lower extremities, lymphedema garments in place.             Signed Electronically by: Mikala Luna MD

## 2024-02-08 NOTE — RESULT ENCOUNTER NOTE
Bess,    These labs are normal or acceptable.    Please contact my office if you have questions.    Mikala Luna M.D.

## 2024-02-21 ENCOUNTER — TELEPHONE (OUTPATIENT)
Dept: FAMILY MEDICINE | Facility: CLINIC | Age: 49
End: 2024-02-21
Payer: MEDICARE

## 2024-02-21 NOTE — TELEPHONE ENCOUNTER
Denice from [facility] calling for physical therapy orders:    Every other week for 8 weeks    1X week for 1 week    Denice: [phone]     Marce Wilcox RN on 2/21/2024 at 12:20 PM

## 2024-02-23 ENCOUNTER — TELEPHONE (OUTPATIENT)
Dept: FAMILY MEDICINE | Facility: CLINIC | Age: 49
End: 2024-02-23
Payer: MEDICARE

## 2024-02-23 NOTE — TELEPHONE ENCOUNTER
Home Care is calling regarding an established patient with [facility].       Requesting orders from: Mikala Luna  Provider is following patient: Yes  Is this a 60-day recertification request?  No    Orders Requested    Skilled Nursing  Request for initial certification (first set of orders)   Frequency:  1 x/wk for every other week for 8  wks      Verbal orders given.  Home Care will send orders for provider to sign.  Confirmed ok to leave a detailed message with call back.  Contact information confirmed and updated as needed.    Sondra Summers RN

## 2024-02-23 NOTE — TELEPHONE ENCOUNTER
Alexandrea from [facility] calling for re certification order for OT    1 visit every 2 weeks for 8 weeks    Gave verbal order    Marce Wilcox, RN on 2/23/2024 at 2:27 PM

## 2024-02-26 ENCOUNTER — MEDICAL CORRESPONDENCE (OUTPATIENT)
Dept: HEALTH INFORMATION MANAGEMENT | Facility: CLINIC | Age: 49
End: 2024-02-26
Payer: MEDICARE

## 2024-03-14 ENCOUNTER — TELEPHONE (OUTPATIENT)
Dept: FAMILY MEDICINE | Facility: CLINIC | Age: 49
End: 2024-03-14
Payer: MEDICARE

## 2024-03-14 NOTE — TELEPHONE ENCOUNTER
Dr. Luna:    Received a call from Key home care RN from [facility](call back is [phone]). Key is with patient now, she says patient has been having small amount rectal bleeding noted in toilet and toilet paper today, Key says patient has history of hemorrhoids and thinks this what is causing the bleeding. Key says it was bright red blood, vitals are stable, patient denies weakness, not dizzy, no abdominal pain. Key says patient will order some preparation H wipes to see if that helps. Says patient reports she has intermittent constipation and goes about every 2 days and reporting she is not straining. Key is told to have patient monitor this, can try the Prep H wipes, if continues to have rectal bleeding despite Prep H wipes or gets worse and accompanied by symptoms (abdominal pain, weakness, dizziness), may need to be seen more urgently, patient can also call care team back to discuss. Key is told to ask patient to increase water and healthy diet with veggies and fruits to help with constipation. Will send to PCP to update and review.      KINDRA Man

## 2024-03-16 ENCOUNTER — APPOINTMENT (OUTPATIENT)
Dept: ULTRASOUND IMAGING | Facility: HOSPITAL | Age: 49
DRG: 871 | End: 2024-03-16
Attending: INTERNAL MEDICINE
Payer: MEDICARE

## 2024-03-16 ENCOUNTER — APPOINTMENT (OUTPATIENT)
Dept: CT IMAGING | Facility: HOSPITAL | Age: 49
DRG: 871 | End: 2024-03-16
Attending: EMERGENCY MEDICINE
Payer: MEDICARE

## 2024-03-16 ENCOUNTER — HOSPITAL ENCOUNTER (INPATIENT)
Facility: HOSPITAL | Age: 49
LOS: 4 days | Discharge: HOME HEALTH | DRG: 871 | End: 2024-03-20
Attending: EMERGENCY MEDICINE | Admitting: INTERNAL MEDICINE
Payer: MEDICARE

## 2024-03-16 ENCOUNTER — APPOINTMENT (OUTPATIENT)
Dept: RADIOLOGY | Facility: HOSPITAL | Age: 49
DRG: 871 | End: 2024-03-16
Attending: EMERGENCY MEDICINE
Payer: MEDICARE

## 2024-03-16 DIAGNOSIS — J96.22 ACUTE ON CHRONIC RESPIRATORY FAILURE WITH HYPOXIA AND HYPERCAPNIA (H): ICD-10-CM

## 2024-03-16 DIAGNOSIS — A41.9 SEPSIS, DUE TO UNSPECIFIED ORGANISM, UNSPECIFIED WHETHER ACUTE ORGAN DYSFUNCTION PRESENT (H): ICD-10-CM

## 2024-03-16 DIAGNOSIS — L03.119 CELLULITIS OF LOWER EXTREMITY, UNSPECIFIED LATERALITY: Primary | ICD-10-CM

## 2024-03-16 DIAGNOSIS — I50.9 ACUTE ON CHRONIC CONGESTIVE HEART FAILURE, UNSPECIFIED HEART FAILURE TYPE (H): ICD-10-CM

## 2024-03-16 DIAGNOSIS — J96.21 ACUTE ON CHRONIC RESPIRATORY FAILURE WITH HYPOXEMIA (H): ICD-10-CM

## 2024-03-16 DIAGNOSIS — J96.21 ACUTE ON CHRONIC RESPIRATORY FAILURE WITH HYPOXIA AND HYPERCAPNIA (H): ICD-10-CM

## 2024-03-16 DIAGNOSIS — K76.0 HEPATIC STEATOSIS: ICD-10-CM

## 2024-03-16 DIAGNOSIS — L03.115 CELLULITIS OF RIGHT LEG: ICD-10-CM

## 2024-03-16 DIAGNOSIS — K92.2 LOWER GI BLEED: ICD-10-CM

## 2024-03-16 DIAGNOSIS — N10 PYELONEPHRITIS, ACUTE: ICD-10-CM

## 2024-03-16 DIAGNOSIS — L03.116 LEFT LEG CELLULITIS: ICD-10-CM

## 2024-03-16 DIAGNOSIS — R94.31 PROLONGED Q-T INTERVAL ON ECG: ICD-10-CM

## 2024-03-16 DIAGNOSIS — D27.0 DERMOID CYST OF OVARY, RIGHT: ICD-10-CM

## 2024-03-16 DIAGNOSIS — E66.2 OBESITY HYPOVENTILATION SYNDROME (H): ICD-10-CM

## 2024-03-16 DIAGNOSIS — E66.01 MORBID OBESITY (H): ICD-10-CM

## 2024-03-16 LAB
ABO/RH(D): NORMAL
ALBUMIN SERPL BCG-MCNC: 3.8 G/DL (ref 3.5–5.2)
ALBUMIN UR-MCNC: 50 MG/DL
ALP SERPL-CCNC: 71 U/L (ref 40–150)
ALT SERPL W P-5'-P-CCNC: 9 U/L (ref 0–50)
ANION GAP SERPL CALCULATED.3IONS-SCNC: 12 MMOL/L (ref 7–15)
ANTIBODY SCREEN: NEGATIVE
APPEARANCE UR: ABNORMAL
AST SERPL W P-5'-P-CCNC: 20 U/L (ref 0–45)
ATRIAL RATE - MUSE: 102 BPM
BASOPHILS # BLD AUTO: 0.1 10E3/UL (ref 0–0.2)
BASOPHILS NFR BLD AUTO: 0 %
BILIRUB DIRECT SERPL-MCNC: 0.5 MG/DL (ref 0–0.3)
BILIRUB SERPL-MCNC: 2.1 MG/DL
BILIRUB UR QL STRIP: NEGATIVE
BUN SERPL-MCNC: 16.8 MG/DL (ref 6–20)
CALCIUM SERPL-MCNC: 9.7 MG/DL (ref 8.6–10)
CHLORIDE SERPL-SCNC: 103 MMOL/L (ref 98–107)
COLOR UR AUTO: YELLOW
CREAT SERPL-MCNC: 0.83 MG/DL (ref 0.51–0.95)
DEPRECATED HCO3 PLAS-SCNC: 25 MMOL/L (ref 22–29)
DIASTOLIC BLOOD PRESSURE - MUSE: 79 MMHG
EGFRCR SERPLBLD CKD-EPI 2021: 86 ML/MIN/1.73M2
EOSINOPHIL # BLD AUTO: 0 10E3/UL (ref 0–0.7)
EOSINOPHIL NFR BLD AUTO: 0 %
ERYTHROCYTE [DISTWIDTH] IN BLOOD BY AUTOMATED COUNT: 19.8 % (ref 10–15)
FLUAV RNA SPEC QL NAA+PROBE: NEGATIVE
FLUBV RNA RESP QL NAA+PROBE: NEGATIVE
GLUCOSE SERPL-MCNC: 114 MG/DL (ref 70–99)
GLUCOSE UR STRIP-MCNC: NEGATIVE MG/DL
HCT VFR BLD AUTO: 48.7 % (ref 35–47)
HGB BLD-MCNC: 15.4 G/DL (ref 11.7–15.7)
HGB UR QL STRIP: ABNORMAL
HOLD SPECIMEN: NORMAL
HOLD SPECIMEN: NORMAL
IMM GRANULOCYTES # BLD: 0.1 10E3/UL
IMM GRANULOCYTES NFR BLD: 1 %
INTERPRETATION ECG - MUSE: NORMAL
KETONES UR STRIP-MCNC: NEGATIVE MG/DL
LACTATE SERPL-SCNC: 1.9 MMOL/L (ref 0.7–2)
LEUKOCYTE ESTERASE UR QL STRIP: NEGATIVE
LIPASE SERPL-CCNC: 40 U/L (ref 13–60)
LYMPHOCYTES # BLD AUTO: 0.9 10E3/UL (ref 0.8–5.3)
LYMPHOCYTES NFR BLD AUTO: 7 %
MAGNESIUM SERPL-MCNC: 1.9 MG/DL (ref 1.7–2.3)
MCH RBC QN AUTO: 26.8 PG (ref 26.5–33)
MCHC RBC AUTO-ENTMCNC: 31.6 G/DL (ref 31.5–36.5)
MCV RBC AUTO: 85 FL (ref 78–100)
MONOCYTES # BLD AUTO: 0.6 10E3/UL (ref 0–1.3)
MONOCYTES NFR BLD AUTO: 5 %
NEUTROPHILS # BLD AUTO: 11.1 10E3/UL (ref 1.6–8.3)
NEUTROPHILS NFR BLD AUTO: 87 %
NITRATE UR QL: NEGATIVE
NRBC # BLD AUTO: 0 10E3/UL
NRBC BLD AUTO-RTO: 0 /100
NT-PROBNP SERPL-MCNC: 3475 PG/ML (ref 0–450)
P AXIS - MUSE: NORMAL DEGREES
PH UR STRIP: 6.5 [PH] (ref 5–7)
PLATELET # BLD AUTO: 189 10E3/UL (ref 150–450)
POTASSIUM SERPL-SCNC: 4.1 MMOL/L (ref 3.4–5.3)
PR INTERVAL - MUSE: NORMAL MS
PROT SERPL-MCNC: 8.7 G/DL (ref 6.4–8.3)
QRS DURATION - MUSE: 100 MS
QT - MUSE: 458 MS
QTC - MUSE: 638 MS
R AXIS - MUSE: 106 DEGREES
RBC # BLD AUTO: 5.75 10E6/UL (ref 3.8–5.2)
RBC URINE: 3 /HPF
RSV RNA SPEC NAA+PROBE: NEGATIVE
SARS-COV-2 RNA RESP QL NAA+PROBE: NEGATIVE
SODIUM SERPL-SCNC: 140 MMOL/L (ref 135–145)
SP GR UR STRIP: 1.03 (ref 1–1.03)
SPECIMEN EXPIRATION DATE: NORMAL
SQUAMOUS EPITHELIAL: 3 /HPF
SYSTOLIC BLOOD PRESSURE - MUSE: 132 MMHG
T AXIS - MUSE: 76 DEGREES
TROPONIN T SERPL HS-MCNC: 37 NG/L
TROPONIN T SERPL HS-MCNC: 37 NG/L
URATE CRY #/AREA URNS HPF: ABNORMAL /HPF
UROBILINOGEN UR STRIP-MCNC: 3 MG/DL
VENTRICULAR RATE- MUSE: 117 BPM
WBC # BLD AUTO: 12.7 10E3/UL (ref 4–11)
WBC URINE: 1 /HPF

## 2024-03-16 PROCEDURE — 99291 CRITICAL CARE FIRST HOUR: CPT | Mod: 25

## 2024-03-16 PROCEDURE — 5A09357 ASSISTANCE WITH RESPIRATORY VENTILATION, LESS THAN 24 CONSECUTIVE HOURS, CONTINUOUS POSITIVE AIRWAY PRESSURE: ICD-10-PCS | Performed by: INTERNAL MEDICINE

## 2024-03-16 PROCEDURE — 83690 ASSAY OF LIPASE: CPT | Performed by: EMERGENCY MEDICINE

## 2024-03-16 PROCEDURE — 93970 EXTREMITY STUDY: CPT

## 2024-03-16 PROCEDURE — 999N000157 HC STATISTIC RCP TIME EA 10 MIN

## 2024-03-16 PROCEDURE — 250N000011 HC RX IP 250 OP 636: Performed by: EMERGENCY MEDICINE

## 2024-03-16 PROCEDURE — 87637 SARSCOV2&INF A&B&RSV AMP PRB: CPT | Performed by: EMERGENCY MEDICINE

## 2024-03-16 PROCEDURE — 99222 1ST HOSP IP/OBS MODERATE 55: CPT | Performed by: INTERNAL MEDICINE

## 2024-03-16 PROCEDURE — 83880 ASSAY OF NATRIURETIC PEPTIDE: CPT | Performed by: EMERGENCY MEDICINE

## 2024-03-16 PROCEDURE — 80048 BASIC METABOLIC PNL TOTAL CA: CPT | Performed by: EMERGENCY MEDICINE

## 2024-03-16 PROCEDURE — 93005 ELECTROCARDIOGRAM TRACING: CPT | Performed by: EMERGENCY MEDICINE

## 2024-03-16 PROCEDURE — 84484 ASSAY OF TROPONIN QUANT: CPT | Performed by: EMERGENCY MEDICINE

## 2024-03-16 PROCEDURE — 84484 ASSAY OF TROPONIN QUANT: CPT | Performed by: INTERNAL MEDICINE

## 2024-03-16 PROCEDURE — 83605 ASSAY OF LACTIC ACID: CPT | Performed by: EMERGENCY MEDICINE

## 2024-03-16 PROCEDURE — 82248 BILIRUBIN DIRECT: CPT | Performed by: EMERGENCY MEDICINE

## 2024-03-16 PROCEDURE — G1010 CDSM STANSON: HCPCS

## 2024-03-16 PROCEDURE — 250N000013 HC RX MED GY IP 250 OP 250 PS 637: Performed by: INTERNAL MEDICINE

## 2024-03-16 PROCEDURE — 96375 TX/PRO/DX INJ NEW DRUG ADDON: CPT

## 2024-03-16 PROCEDURE — 258N000003 HC RX IP 258 OP 636: Performed by: EMERGENCY MEDICINE

## 2024-03-16 PROCEDURE — 87186 SC STD MICRODIL/AGAR DIL: CPT | Performed by: EMERGENCY MEDICINE

## 2024-03-16 PROCEDURE — 36415 COLL VENOUS BLD VENIPUNCTURE: CPT | Performed by: INTERNAL MEDICINE

## 2024-03-16 PROCEDURE — 71045 X-RAY EXAM CHEST 1 VIEW: CPT

## 2024-03-16 PROCEDURE — 86900 BLOOD TYPING SEROLOGIC ABO: CPT | Performed by: EMERGENCY MEDICINE

## 2024-03-16 PROCEDURE — 96366 THER/PROPH/DIAG IV INF ADDON: CPT

## 2024-03-16 PROCEDURE — 99223 1ST HOSP IP/OBS HIGH 75: CPT | Mod: AI | Performed by: INTERNAL MEDICINE

## 2024-03-16 PROCEDURE — 250N000011 HC RX IP 250 OP 636: Performed by: INTERNAL MEDICINE

## 2024-03-16 PROCEDURE — 87149 DNA/RNA DIRECT PROBE: CPT | Performed by: EMERGENCY MEDICINE

## 2024-03-16 PROCEDURE — 74177 CT ABD & PELVIS W/CONTRAST: CPT | Mod: MG

## 2024-03-16 PROCEDURE — 80053 COMPREHEN METABOLIC PANEL: CPT | Performed by: EMERGENCY MEDICINE

## 2024-03-16 PROCEDURE — 96367 TX/PROPH/DG ADDL SEQ IV INF: CPT

## 2024-03-16 PROCEDURE — 96365 THER/PROPH/DIAG IV INF INIT: CPT | Mod: 59

## 2024-03-16 PROCEDURE — 94660 CPAP INITIATION&MGMT: CPT

## 2024-03-16 PROCEDURE — 36415 COLL VENOUS BLD VENIPUNCTURE: CPT | Performed by: EMERGENCY MEDICINE

## 2024-03-16 PROCEDURE — 94761 N-INVAS EAR/PLS OXIMETRY MLT: CPT

## 2024-03-16 PROCEDURE — 210N000001 HC R&B IMCU HEART CARE

## 2024-03-16 PROCEDURE — 83735 ASSAY OF MAGNESIUM: CPT | Performed by: INTERNAL MEDICINE

## 2024-03-16 PROCEDURE — 85025 COMPLETE CBC W/AUTO DIFF WBC: CPT | Performed by: EMERGENCY MEDICINE

## 2024-03-16 PROCEDURE — 81001 URINALYSIS AUTO W/SCOPE: CPT | Performed by: EMERGENCY MEDICINE

## 2024-03-16 RX ORDER — AMOXICILLIN 250 MG
2 CAPSULE ORAL 2 TIMES DAILY PRN
Status: DISCONTINUED | OUTPATIENT
Start: 2024-03-16 | End: 2024-03-20 | Stop reason: HOSPADM

## 2024-03-16 RX ORDER — IOPAMIDOL 755 MG/ML
90 INJECTION, SOLUTION INTRAVASCULAR ONCE
Status: COMPLETED | OUTPATIENT
Start: 2024-03-16 | End: 2024-03-16

## 2024-03-16 RX ORDER — LORAZEPAM 2 MG/ML
0.5 INJECTION INTRAMUSCULAR ONCE
Status: COMPLETED | OUTPATIENT
Start: 2024-03-16 | End: 2024-03-16

## 2024-03-16 RX ORDER — VANCOMYCIN HYDROCHLORIDE 1 G/200ML
1000 INJECTION, SOLUTION INTRAVENOUS EVERY 12 HOURS
Status: DISCONTINUED | OUTPATIENT
Start: 2024-03-16 | End: 2024-03-17

## 2024-03-16 RX ORDER — AMOXICILLIN 250 MG
1 CAPSULE ORAL 2 TIMES DAILY PRN
Status: DISCONTINUED | OUTPATIENT
Start: 2024-03-16 | End: 2024-03-20 | Stop reason: HOSPADM

## 2024-03-16 RX ORDER — ACETAMINOPHEN 325 MG/1
650 TABLET ORAL ONCE
Status: DISCONTINUED | OUTPATIENT
Start: 2024-03-16 | End: 2024-03-16

## 2024-03-16 RX ORDER — ASPIRIN 81 MG/1
81 TABLET ORAL DAILY
Status: DISCONTINUED | OUTPATIENT
Start: 2024-03-16 | End: 2024-03-20 | Stop reason: HOSPADM

## 2024-03-16 RX ORDER — CEFAZOLIN SODIUM 1 G/50ML
2500 SOLUTION INTRAVENOUS ONCE
Status: COMPLETED | OUTPATIENT
Start: 2024-03-16 | End: 2024-03-16

## 2024-03-16 RX ORDER — ACETAMINOPHEN 325 MG/1
650 TABLET ORAL EVERY 8 HOURS PRN
Status: DISCONTINUED | OUTPATIENT
Start: 2024-03-16 | End: 2024-03-20 | Stop reason: HOSPADM

## 2024-03-16 RX ORDER — METOPROLOL SUCCINATE 25 MG/1
25 TABLET, EXTENDED RELEASE ORAL DAILY
Status: DISCONTINUED | OUTPATIENT
Start: 2024-03-16 | End: 2024-03-20 | Stop reason: HOSPADM

## 2024-03-16 RX ORDER — PIPERACILLIN SODIUM, TAZOBACTAM SODIUM 3; .375 G/15ML; G/15ML
3.38 INJECTION, POWDER, LYOPHILIZED, FOR SOLUTION INTRAVENOUS ONCE
Status: COMPLETED | OUTPATIENT
Start: 2024-03-16 | End: 2024-03-16

## 2024-03-16 RX ORDER — LIDOCAINE 40 MG/G
CREAM TOPICAL
Status: DISCONTINUED | OUTPATIENT
Start: 2024-03-16 | End: 2024-03-20 | Stop reason: HOSPADM

## 2024-03-16 RX ORDER — IOPAMIDOL 755 MG/ML
90 INJECTION, SOLUTION INTRAVASCULAR ONCE
Status: DISCONTINUED | OUTPATIENT
Start: 2024-03-16 | End: 2024-03-16

## 2024-03-16 RX ORDER — CALCIUM CARBONATE 500 MG/1
1000 TABLET, CHEWABLE ORAL 4 TIMES DAILY PRN
Status: DISCONTINUED | OUTPATIENT
Start: 2024-03-16 | End: 2024-03-20 | Stop reason: HOSPADM

## 2024-03-16 RX ORDER — MEROPENEM 1 G/1
1 INJECTION, POWDER, FOR SOLUTION INTRAVENOUS EVERY 8 HOURS
Status: DISCONTINUED | OUTPATIENT
Start: 2024-03-16 | End: 2024-03-18

## 2024-03-16 RX ORDER — BUMETANIDE 0.25 MG/ML
2 INJECTION INTRAMUSCULAR; INTRAVENOUS ONCE
Status: COMPLETED | OUTPATIENT
Start: 2024-03-16 | End: 2024-03-16

## 2024-03-16 RX ADMIN — MEROPENEM 1 G: 1 INJECTION, POWDER, FOR SOLUTION INTRAVENOUS at 22:46

## 2024-03-16 RX ADMIN — VANCOMYCIN HYDROCHLORIDE 1000 MG: 1 INJECTION, SOLUTION INTRAVENOUS at 19:27

## 2024-03-16 RX ADMIN — IOPAMIDOL 90 ML: 755 INJECTION, SOLUTION INTRAVENOUS at 09:07

## 2024-03-16 RX ADMIN — ACETAMINOPHEN 650 MG: 325 TABLET ORAL at 16:01

## 2024-03-16 RX ADMIN — LORAZEPAM 0.5 MG: 2 INJECTION INTRAMUSCULAR; INTRAVENOUS at 08:10

## 2024-03-16 RX ADMIN — BUMETANIDE 2 MG: 0.25 INJECTION INTRAMUSCULAR; INTRAVENOUS at 07:39

## 2024-03-16 RX ADMIN — ASPIRIN 81 MG: 81 TABLET, COATED ORAL at 18:06

## 2024-03-16 RX ADMIN — METOPROLOL SUCCINATE 25 MG: 25 TABLET, EXTENDED RELEASE ORAL at 18:05

## 2024-03-16 RX ADMIN — VANCOMYCIN HYDROCHLORIDE 2500 MG: 5 INJECTION, POWDER, LYOPHILIZED, FOR SOLUTION INTRAVENOUS at 07:02

## 2024-03-16 RX ADMIN — PIPERACILLIN AND TAZOBACTAM 3.38 G: 3; .375 INJECTION, POWDER, FOR SOLUTION INTRAVENOUS at 06:29

## 2024-03-16 RX ADMIN — MEROPENEM 1 G: 1 INJECTION, POWDER, FOR SOLUTION INTRAVENOUS at 18:06

## 2024-03-16 ASSESSMENT — ACTIVITIES OF DAILY LIVING (ADL)
DIFFICULTY_COMMUNICATING: NO
WEAR_GLASSES_OR_BLIND: NO
ADLS_ACUITY_SCORE: 38
DEPENDENT_IADLS:: INDEPENDENT;TRANSPORTATION
CONCENTRATING,_REMEMBERING_OR_MAKING_DECISIONS_DIFFICULTY: NO
ADLS_ACUITY_SCORE: 38
DRESSING/BATHING_DIFFICULTY: YES
ADLS_ACUITY_SCORE: 47
ADLS_ACUITY_SCORE: 47
DIFFICULTY_EATING/SWALLOWING: NO
FALL_HISTORY_WITHIN_LAST_SIX_MONTHS: NO
ADLS_ACUITY_SCORE: 38
TOILETING_MANAGEMENT: PUREWICK
ADLS_ACUITY_SCORE: 47
ADLS_ACUITY_SCORE: 47
HEARING_DIFFICULTY_OR_DEAF: NO
TOILETING_ISSUES: YES
ADLS_ACUITY_SCORE: 38
DRESSING/BATHING: DRESSING DIFFICULTY, REQUIRES EQUIPMENT
ADLS_ACUITY_SCORE: 47
ADLS_ACUITY_SCORE: 38
WALKING_OR_CLIMBING_STAIRS_DIFFICULTY: YES
ADLS_ACUITY_SCORE: 38
ADLS_ACUITY_SCORE: 47
TOILETING: 1-->ASSISTANCE (EQUIPMENT/PERSON) NEEDED
ADLS_ACUITY_SCORE: 38
TOILETING_ASSISTANCE: TOILETING DIFFICULTY, ASSISTANCE 1 PERSON
ADLS_ACUITY_SCORE: 47
CHANGE_IN_FUNCTIONAL_STATUS_SINCE_ONSET_OF_CURRENT_ILLNESS/INJURY: NO
TOILETING: 1-->ASSISTANCE (EQUIPMENT/PERSON) NEEDED (NOT DEVELOPMENTALLY APPROPRIATE)
ADLS_ACUITY_SCORE: 47
DOING_ERRANDS_INDEPENDENTLY_DIFFICULTY: YES
WALKING_OR_CLIMBING_STAIRS: AMBULATION DIFFICULTY, ASSISTANCE 1 PERSON

## 2024-03-16 ASSESSMENT — ENCOUNTER SYMPTOMS
DIARRHEA: 0
CHILLS: 1
SORE THROAT: 0
CONSTIPATION: 1
FEVER: 1
NAUSEA: 1
DYSURIA: 0
RHINORRHEA: 0
MYALGIAS: 1
ANAL BLEEDING: 1
ABDOMINAL PAIN: 0
VOMITING: 0

## 2024-03-16 ASSESSMENT — COLUMBIA-SUICIDE SEVERITY RATING SCALE - C-SSRS
2. HAVE YOU ACTUALLY HAD ANY THOUGHTS OF KILLING YOURSELF IN THE PAST MONTH?: NO
1. IN THE PAST MONTH, HAVE YOU WISHED YOU WERE DEAD OR WISHED YOU COULD GO TO SLEEP AND NOT WAKE UP?: NO
6. HAVE YOU EVER DONE ANYTHING, STARTED TO DO ANYTHING, OR PREPARED TO DO ANYTHING TO END YOUR LIFE?: NO

## 2024-03-16 NOTE — PHARMACY-VANCOMYCIN DOSING SERVICE
Pharmacy Vancomycin Initial Note  Date of Service 2024  Patient's  1975  48 year old, female    Indication: Sepsis    Current estimated CrCl = Estimated Creatinine Clearance: 136.9 mL/min (based on SCr of 0.71 mg/dL).    Creatinine for last 3 days  No results found for requested labs within last 3 days.    Recent Vancomycin Level(s) for last 3 days  No results found for requested labs within last 3 days.      Vancomycin IV Administrations (past 72 hours)        No vancomycin orders with administrations in past 72 hours.                    Nephrotoxins and other renal medications (From now, onward)      Start     Dose/Rate Route Frequency Ordered Stop    24 0630  piperacillin-tazobactam (ZOSYN) 3.375 g vial to attach to  mL bag         3.375 g  over 30 Minutes Intravenous ONCE 24 0607              Contrast Orders - past 72 hours (72h ago, onward)      None            Plan:  Vancomycin  2500 mg IV once  Please re-consult pharmacist if vancomycin is to be continued inpatient    Anali Maynard, PharmD, BCPS 24 6:10 AM

## 2024-03-16 NOTE — H&P
ADMISSION HISTORY & PHYSICAL    CODE STATUS:  Mikala Jauregui, [phone]    Patient YOB: 1975  Admit date: 3/16/2024  Date of Service: 3/16/2024    ASSESSMENT AND PLAN:  Patient is a 48 year old female with PMH significant for morbid obesity, lymphedema, NARCISA on CPAP, OHS, depression, HFpEF, PE (not on AC) who presented to our ED for evaluation of leg pain, fever and chills.     Sepsis  Thigh cellulitis  -Patient presented with fever/chills, sinus tachycarida, thigh pain and leucocytosis in the context of chronic lymphedema  -Blood culture obtained in ED. UA- negative. Started IV vanc/meropenem. MRSA screening.    Leg swelling  Cough/shortness of breath  -Likely combination of cellulitis, CHF again in the context of chronic lymphedema. H/O PE noted per chart review. No PE now per CTA chest. Will order ultrasound legs to rule out DVT  -BNP 3475. No prior BNP for comparison. Echo as of 12/2023 showed EF of 50-55%. Repeat echo  -Chest xray shows mild venous congestion. But no infiltrate. Received 2mg of IV bumex in ED. Cont home bumex from tomorrow    Mild trop elevation  -Trop of 37 noted. EKG showed accelerated junction rhythm with prolong Qtc  -Trend trop. Check echo    Prolong QTc  -Qtc of 638 noted on EKG. Potassium is normal. Check Mg  -Avoid QT prolong meds. Hold sertraline (low risk for QT prolongation)    Rectal bleeding, intermittent  -Bright red with stools per patient. Suspect from hemorrhoids/outlet bleeding  -Hgb is stable. Will consult GI if continues to bleed here    NARCISA  OHS  -Cont CPAP at night. Supplemental O2     H/O PE  -Few years ago. Was on AC only for few months per patient. Stopped taking AC per her MD's recommendation per patient    Clinically Significant Risk Factors Present on Admission                # Drug Induced Platelet Defect: home medication list includes an antiplatelet medication   # Hypertension: Noted on problem list  # Acute heart failure with preserved  "ejection fraction: heart failure noted on problem list, last echo with EF >50%, and receiving IV diuretics  # Acute Respiratory Failure: Documented O2 saturation < 91%.  Continue supplemental oxygen as needed     # Severe Obesity: Estimated body mass index is 61.66 kg/m  as calculated from the following:    Height as of this encounter: 1.6 m (5' 3\").    Weight as of this encounter: 157.9 kg (348 lb 1.7 oz).       # Financial/Environmental Concerns:           Disposition:  -Anticipated Length of Stay in midnights and medical necessity (including a midnight in the Emergency Department after triage if applicable): >      CHIEF COMPLAINT:  Fever/chills, thigh pain    HISTORY OF PRESENTING ILLNESS:  Patient is a 48 year old female with PMH significant for morbid obesity, lymphedema, NARCISA on CPAP, OHS, depression, HFpEF, PE (not on AC) who presented to our ED for evaluation of leg pain, fever and chills.     Patient reports doing fairly well until yesterday when she started to notice chills and fever. Endorses having bilateral leg pain attributed to cellulitis. Also endorses having flu-like symptoms though tested negative for flu, covid and RSV in ED. Endorses having cough and shortness of breath. No chest pain. Patient is usually on 2L of O2 with CPAP at night, and 1L during the day. Here in ED, she needed 12L at one point.     PMH/PSH:  Patient Active Problem List   Diagnosis    Multiple sclerosis (H)    Polycystic ovaries    Constipation    CARDIOVASCULAR SCREENING; LDL GOAL LESS THAN 160    Anxiety    Restless legs    Leg edema    Eating disorder    Papanicolaou smear of cervix with low grade squamous intraepithelial lesion (LGSIL)    Class 3 severe obesity due to excess calories with serious comorbidity and body mass index (BMI) of 50.0 to 59.9 in adult (H)    Peroneal tendon rupture    Benign essential hypertension    NARCISA on CPAP    Moderate episode of recurrent major depressive disorder (H)    Sepsis (Temp 101, HR " 116, WBC 13.0)    History of abnormal cervical Pap smear    Generalized anxiety disorder    Lymphedema of both lower extremities    Tachycardia    Cellulitis of right leg    Acute pulmonary embolism without acute cor pulmonale, unspecified pulmonary embolism type (H)    Acute on chronic diastolic congestive heart failure (H)    NSTEMI (non-ST elevated myocardial infarction) (H)    Dehydration    Hyperkalemia    Acute renal failure, unspecified acute renal failure type (H24)    Hypotension due to hypovolemia    History of pulmonary embolism    Ureteral stone    Urinary tract infection    Hydronephrosis with urinary obstruction due to ureteral calculus    Chronic heart failure with preserved ejection fraction (H)    Splenomegaly    Hyponatremia    Renal insufficiency    Recurrent UTI's    Hypokalemia    Pyelonephritis, acute    Cellulitis of lower extremity, unspecified laterality    Acute on chronic respiratory failure with hypoxia and hypercapnia (H)    Obesity hypoventilation syndrome (H)    Morbid obesity (H)    Lower GI bleed    Prolonged Q-T interval on ECG    Left leg cellulitis    Acute on chronic respiratory failure with hypoxemia (H)    Acute on chronic congestive heart failure, unspecified heart failure type (H)    Sepsis, due to unspecified organism, unspecified whether acute organ dysfunction present (H)       Past Medical History:   Diagnosis Date    Acute on chronic diastolic congestive heart failure (H) 02/09/2022    Arthritis 2019    Hips    ASCUS favor benign 08/2015    Neg HPV    Depressive disorder 2010    H/O colposcopy with cervical biopsy 09/14/2015    Bx & ECC - negative    Hypertension 2019    LSIL on Pap smear 07/2014    Neg high risk HPV    Multiple sclerosis (H) 08/29/2005 9/18/200pt dx with MS 2004--dx by neurolgist and is followed by Dr. Harris    Myocardial infarction (H)     Obese     Pneumonia due to infectious organism, unspecified laterality, unspecified part of lung 02/08/2022     Shingles 10/2016    SIRS (systemic inflammatory response syndrome) (H) 03/04/2021    Sleep apnea     UNSPEC CONSTIPATION 09/18/2006 9/18/2006   Has tried otc suppository and otc lax.         Past Surgical History:   Procedure Laterality Date    CHOLECYSTECTOMY, LAPOROSCOPIC      Cholecystectomy, Laparoscopic    COLONOSCOPY  2007?    Bathroom issue..results normal    COMBINED CYSTOSCOPY, RETROGRADES, EXCHANGE STENT URETER(S) Right 2/21/2022    Procedure: CYSTOSCOPY, WITH right RETROGRADE PYELOGRAM AND right URETERAL STENT PLACEMENT;  Surgeon: Doyle Palomares MD;  Location: [facility] OR    OPEN REDUCTION INTERNAL FIXATION TOE(S)  4/13/2012    Procedure:OPEN REDUCTION INTERNAL FIXATION TOE(S); Open reduction internal fixation right proximal fifth metatarsal fracture-   Anes-choice block; Surgeon:LEY, JEFFREY DUANE; Location:[facility] OR    PICC TRIPLE LUMEN PLACEMENT  2/21/2022               ALLERGIES:  Allergies   Allergen Reactions    Nkda [No Known Drug Allergy]        MEDICATIONS:  Reviewed.  No current facility-administered medications for this encounter.     Current Outpatient Medications   Medication    acetaminophen (TYLENOL) 650 MG CR tablet    aspirin (ASPIRIN LOW DOSE) 81 MG EC tablet    bumetanide (BUMEX) 1 MG tablet    metoprolol succinate ER (TOPROL XL) 25 MG 24 hr tablet    miconazole (MICATIN) 2 % AERP powder    sertraline (ZOLOFT) 100 MG tablet     No current facility-administered medications for this encounter.    Current Outpatient Medications:     acetaminophen (TYLENOL) 650 MG CR tablet, Take 650 mg by mouth every 8 hours as needed for mild pain or fever, Disp: , Rfl:     aspirin (ASPIRIN LOW DOSE) 81 MG EC tablet, Take 1 tablet (81 mg) by mouth daily, Disp: , Rfl:     bumetanide (BUMEX) 1 MG tablet, Take 3 tablets (3 mg) by mouth daily, Disp: 270 tablet, Rfl: 3    metoprolol succinate ER (TOPROL XL) 25 MG 24 hr tablet, Take 1 tablet (25 mg) by mouth daily, Disp: 90 tablet, Rfl: 3    miconazole  (MICATIN) 2 % AERP powder, Apply topically 2 times daily as needed, Disp: , Rfl:     sertraline (ZOLOFT) 100 MG tablet, Take 1 tablet (100 mg) by mouth daily, Disp: 90 tablet, Rfl: 3   Scheduled Meds:  Continuous Infusions:  PRN Meds:.    SOCIAL HISTORY:  Social History     Socioeconomic History    Marital status: Single     Spouse name: Not on file    Number of children: Not on file    Years of education: Not on file    Highest education level: Not on file   Occupational History     Employer: [employer]   Tobacco Use    Smoking status: Never    Smokeless tobacco: Never   Vaping Use    Vaping Use: Never used   Substance and Sexual Activity    Alcohol use: Yes     Comment: social    Drug use: No    Sexual activity: Not Currently     Partners: Male     Birth control/protection: Pill   Other Topics Concern    Parent/sibling w/ CABG, MI or angioplasty before 65F 55M? No   Social History Narrative    , 3rd-5th grade     Social Determinants of Health     Financial Resource Strain: Low Risk  (9/25/2023)    Financial Resource Strain     Within the past 12 months, have you or your family members you live with been unable to get utilities (heat, electricity) when it was really needed?: No   Food Insecurity: Low Risk  (9/25/2023)    Food Insecurity     Within the past 12 months, did you worry that your food would run out before you got money to buy more?: No     Within the past 12 months, did the food you bought just not last and you didn t have money to get more?: No   Transportation Needs: High Risk (9/25/2023)    Transportation Needs     Within the past 12 months, has lack of transportation kept you from medical appointments, getting your medicines, non-medical meetings or appointments, work, or from getting things that you need?: Yes   Physical Activity: Inactive (3/1/2022)    Exercise Vital Sign     Days of Exercise per Week: 0 days     Minutes of Exercise per Session: 0 min   Stress: No  Stress Concern Present (12/4/2020)    [employer] - Occupational Stress Questionnaire     Feeling of Stress : Only a little   Social Connections: Unknown (3/1/2022)    Social Connection and Isolation Panel [NHANES]     Frequency of Communication with Friends and Family: Twice a week     Frequency of Social Gatherings with Friends and Family: Twice a week     Attends Druze Services: Not on file     Active Member of Clubs or Organizations: Not on file     Attends Club or Organization Meetings: Not on file     Marital Status: Not on file   Interpersonal Safety: Low Risk  (9/27/2023)    Interpersonal Safety     Do you feel physically and emotionally safe where you currently live?: Yes     Within the past 12 months, have you been hit, slapped, kicked or otherwise physically hurt by someone?: No     Within the past 12 months, have you been humiliated or emotionally abused in other ways by your partner or ex-partner?: No   Housing Stability: Low Risk  (9/25/2023)    Housing Stability     Do you have housing? : Yes     Are you worried about losing your housing?: No       FAMILY HISTORY:  Family History   Problem Relation Age of Onset    Neurologic Disorder Father         MS    Heart Disease Father         irregular heart rate    Diabetes Father     Melanoma Father     Sleep Apnea Father     Other Cancer Father     Cancer Maternal Grandfather         lung    Other Cancer Maternal Grandfather     Cancer Paternal Grandmother         stomach    Other Cancer Paternal Grandmother     Cancer Mother     Other Cancer Mother     Thyroid Disease Mother     Gynecology Maternal Aunt         PCOS    Hypertension Maternal Grandmother     Anxiety Disorder Maternal Grandmother     Thyroid Disease Maternal Grandmother     Anxiety Disorder Sister         ROS:  12 point review of systems reviewed and is negative except for what has already been mentioned in HPI.       PHYSICAL EXAM:  GENRL:  Not in acute  "distress   BP (!) 141/79   Pulse 119   Temp (!) 100.7  F (38.2  C) (Oral)   Resp (!) 57   Ht 1.6 m (5' 3\")   Wt (!) 157.9 kg (348 lb 1.7 oz)   SpO2 90%   BMI 61.66 kg/m    No intake/output data recorded.  I/O this shift:  In: 550 [I.V.:50; IV Piggyback:500]  Out: -   HEENT: NC/AT      Eyes-  Pupil round and reactive to light bilaterally       Neck- supple, no JVP elevation,       Sclera- anicteric      Oropharynx- moist and pink  CHEST: Clear to auscultation bilateral anteriorly, no ronchi or wheezing  HEART: S1S2 normal, regular.   ABDMN: Soft. Non-tender, non-distended.  No guarding or rigidity. Bowel sounds- active  EXTRM: Chronic LE lymphedema with induration and verrucous changes. . Redness and induration of the tissue over the inner thigh, left worse than the right. The redness over the feet and calf area aren't new per patient.   INTGM: No skin rash, no cyanosis or clubbing  MUSCULOSKELETAL: no joint tenderness or swelling on upper and lower extremities  NEURO: Alert and awake. Cranial nerves II-XII grossly intact. No focal neurological deficit.  PSYCH:     GENITOURINARY:       DIAGNOSTIC DATA:    Recent Labs   Lab 03/16/24  0609   WBC 12.7*   HGB 15.4   HCT 48.7*          Recent Labs   Lab 03/16/24  0609      CO2 25   BUN 16.8       No results for input(s): \"INR\" in the last 168 hours.    Recent Labs   Lab 03/16/24  0609      CO2 25   BUN 16.8   ALBUMIN 3.8   ALKPHOS 71   ALT 9   AST 20       [unfilled]    CT Chest Pulmonary Embolism w Contrast    Result Date: 3/16/2024  EXAM: CT CHEST PULMONARY EMBOLISM W CONTRAST, CT ABDOMEN PELVIS W CONTRAST LOCATION: [facility] DATE: 3/16/2024 INDICATION: hypoxia increased oxygen demand evaluate for PE. Sepsis COMPARISON: CT abdomen and pelvis from 12/21/2023, chest CT from 02/08/2022 TECHNIQUE: CT chest pulmonary angiogram during arterial phase injection of IV contrast. CT of the abdomen and pelvis in the portal " venous phase was also performed. Multiplanar reformats and MIP reconstructions were performed. Dose reduction techniques were used. CONTRAST: isovue 370 90ml FINDINGS: ANGIOGRAM CHEST: No pulmonary artery filling defects. The main pulmonary artery is dilated up to 4.4 cm. Thoracic aorta is negative for dissection. LUNGS AND PLEURA: Mild mosaic attenuation of the lungs likely related to the exam being obtained in the expiratory phase of respiration. No effusions. MEDIASTINUM/AXILLAE: Heart size is normal. Mitral annular calcification. There is a mildly enlarged right lower paratracheal lymph node measuring 2.1 x 1.5 cm on series 4 image 88. CORONARY ARTERY CALCIFICATION: Mild. LOWER CHEST: Hepatic steatosis. Hepatomegaly. The liver is mildly dysmorphic. Cholecystectomy. HEPATOBILIARY: Normal. PANCREAS: Normal. SPLEEN: Normal. ADRENAL GLANDS: Normal. KIDNEYS/BLADDER: Punctate nonobstructing right mid to lower pole renal calculus. No hydronephrosis. The bladder is unremarkable. BOWEL: Diverticulosis of the colon. No acute inflammatory change. No obstruction. Mild colonic stool burden. Normal appendix. LYMPH NODES: Again seen are a few borderline enlarged retroperitoneal, iliac, and inguinal lymph nodes. VASCULATURE: Recanalized periumbilical vein. Mild atherosclerosis. PELVIC ORGANS: Large 9 cm mobile ovarian dermoid in the right lower quadrant pelvis arising from the left adnexa/ovary. MUSCULOSKELETAL: Degenerative changes of the spine. Diffuse anasarca. Numerous prominent abdominal and chest wall collaterals/varices.     IMPRESSION: Chest: 1.  No pulmonary embolus. 2.  The main pulmonary artery is dilated up to 4.4 cm suggestive of pulmonary hypertension. 3.  Mild mosaic attenuation of the lungs likely related to the exam being obtained in the expiratory phase of respiration. Other considerations include small airways disease and/or air trapping. 4.  Mildly enlarged right lower paratracheal lymph node may be  reactive. Abdomen and pelvis: 1.  No acute intrapelvic findings. 2.  Punctate nonobstructing right renal calculus. 3.  Hepatic steatosis and hepatomegaly. 4.  The liver is dysmorphic suggestive of fibrosis and/or early cirrhosis. Additionally, there is recanalization of the paraumbilical vein suggestive of portal hypertension. 5.  Similar borderline enlarged retroperitoneal, iliac, and inguinal lymph nodes. 6.  Again seen is the large 9 cm mobile ovarian dermoid in the right lower quadrant pelvis arising from the left adnexa.     CT Abdomen Pelvis w Contrast    Result Date: 3/16/2024  EXAM: CT CHEST PULMONARY EMBOLISM W CONTRAST, CT ABDOMEN PELVIS W CONTRAST LOCATION: [facility] DATE: 3/16/2024 INDICATION: hypoxia increased oxygen demand evaluate for PE. Sepsis COMPARISON: CT abdomen and pelvis from 12/21/2023, chest CT from 02/08/2022 TECHNIQUE: CT chest pulmonary angiogram during arterial phase injection of IV contrast. CT of the abdomen and pelvis in the portal venous phase was also performed. Multiplanar reformats and MIP reconstructions were performed. Dose reduction techniques were used. CONTRAST: isovue 370 90ml FINDINGS: ANGIOGRAM CHEST: No pulmonary artery filling defects. The main pulmonary artery is dilated up to 4.4 cm. Thoracic aorta is negative for dissection. LUNGS AND PLEURA: Mild mosaic attenuation of the lungs likely related to the exam being obtained in the expiratory phase of respiration. No effusions. MEDIASTINUM/AXILLAE: Heart size is normal. Mitral annular calcification. There is a mildly enlarged right lower paratracheal lymph node measuring 2.1 x 1.5 cm on series 4 image 88. CORONARY ARTERY CALCIFICATION: Mild. LOWER CHEST: Hepatic steatosis. Hepatomegaly. The liver is mildly dysmorphic. Cholecystectomy. HEPATOBILIARY: Normal. PANCREAS: Normal. SPLEEN: Normal. ADRENAL GLANDS: Normal. KIDNEYS/BLADDER: Punctate nonobstructing right mid to lower pole renal calculus. No  hydronephrosis. The bladder is unremarkable. BOWEL: Diverticulosis of the colon. No acute inflammatory change. No obstruction. Mild colonic stool burden. Normal appendix. LYMPH NODES: Again seen are a few borderline enlarged retroperitoneal, iliac, and inguinal lymph nodes. VASCULATURE: Recanalized periumbilical vein. Mild atherosclerosis. PELVIC ORGANS: Large 9 cm mobile ovarian dermoid in the right lower quadrant pelvis arising from the left adnexa/ovary. MUSCULOSKELETAL: Degenerative changes of the spine. Diffuse anasarca. Numerous prominent abdominal and chest wall collaterals/varices.     IMPRESSION: Chest: 1.  No pulmonary embolus. 2.  The main pulmonary artery is dilated up to 4.4 cm suggestive of pulmonary hypertension. 3.  Mild mosaic attenuation of the lungs likely related to the exam being obtained in the expiratory phase of respiration. Other considerations include small airways disease and/or air trapping. 4.  Mildly enlarged right lower paratracheal lymph node may be reactive. Abdomen and pelvis: 1.  No acute intrapelvic findings. 2.  Punctate nonobstructing right renal calculus. 3.  Hepatic steatosis and hepatomegaly. 4.  The liver is dysmorphic suggestive of fibrosis and/or early cirrhosis. Additionally, there is recanalization of the paraumbilical vein suggestive of portal hypertension. 5.  Similar borderline enlarged retroperitoneal, iliac, and inguinal lymph nodes. 6.  Again seen is the large 9 cm mobile ovarian dermoid in the right lower quadrant pelvis arising from the left adnexa.     XR Chest Port 1 View    Result Date: 3/16/2024  EXAM: XR CHEST PORTABLE 1 VIEW LOCATION: [facility] DATE: 03/16/2024 INDICATION: Cough. COMPARISON: 12/26/2023.     IMPRESSION: No significant interval change. Cardiac enlargement and mild pulmonary venous congestion. Mild elevation of the right hemidiaphragm. No pneumothorax.       Patient's new lab studies reviewed personally.  Patient's  new radiology reports reviewed personally.  I personally viewed and personally interpreted patient's EKG:     Note created using dragon voice recognition software.  Errors in spelling or words which seems out of context are unintentional.  Sounds alike errors may have escaped editing.     03/16/2024  Bienvenido Morris MD  HOSPITALIST, [facility]  PAGER NO. [phone]

## 2024-03-16 NOTE — PHARMACY-ADMISSION MEDICATION HISTORY
Pharmacist Admission Medication History    Admission medication history is complete. The information provided in this note is only as accurate as the sources available at the time of the update.    Information Source(s): Patient and CareEverywhere/SureScripts via in-person    Pertinent Information:   Aspirin: patient hasn't started yet, she was instructed not to start by home care nurse due to rectal bleeding    Changes made to PTA medication list:  Added: None  Deleted: None  Changed: None    Allergies reviewed with patient and updates made in EHR: yes    Medication History Completed By: BELKYS BATISTA [facility] 3/16/2024 8:02 AM    PTA Med List   Medication Sig Last Dose    acetaminophen (TYLENOL) 650 MG CR tablet Take 650 mg by mouth every 8 hours as needed for mild pain or fever 3/15/2024    aspirin (ASPIRIN LOW DOSE) 81 MG EC tablet Take 1 tablet (81 mg) by mouth daily  at not yet started    bumetanide (BUMEX) 1 MG tablet Take 3 tablets (3 mg) by mouth daily 3/15/2024 at am    metoprolol succinate ER (TOPROL XL) 25 MG 24 hr tablet Take 1 tablet (25 mg) by mouth daily 3/15/2024 at am    miconazole (MICATIN) 2 % AERP powder Apply topically 2 times daily as needed Past Month    sertraline (ZOLOFT) 100 MG tablet Take 1 tablet (100 mg) by mouth daily 3/15/2024 at afternoon

## 2024-03-16 NOTE — CONSULTS
Infectious Disease Consultation:  Requesting MD:  Reason for consult:      HISTORY:  Pt with tremendous obesity and hx of rapidly flaring BLE cellulitis in with concerns about impending cellulitis.  She is in trendelenberg when I examine her and this causes her to become plethoric to the degree entire facies is blue/purple.  It improves when she elevates some.    We saw in feb 2022 for LLE celluitis and covid.     She is admitted and is on abx.  I am not finding severe cellulitis but diffuse erythroderma and innumerable warty like findings of the skin (similar to neurofibromatous disease).        Pertinent past history, past infectious disease history:        Patient Active Problem List   Diagnosis    Multiple sclerosis (H)    Polycystic ovaries    Constipation    CARDIOVASCULAR SCREENING; LDL GOAL LESS THAN 160    Anxiety    Restless legs    Leg edema    Eating disorder    Papanicolaou smear of cervix with low grade squamous intraepithelial lesion (LGSIL)    Class 3 severe obesity due to excess calories with serious comorbidity and body mass index (BMI) of 50.0 to 59.9 in adult (H)    Peroneal tendon rupture    Benign essential hypertension    NARCISA on CPAP    Moderate episode of recurrent major depressive disorder (H)    Sepsis (Temp 101, , WBC 13.0)    History of abnormal cervical Pap smear    Generalized anxiety disorder    Lymphedema of both lower extremities    Tachycardia    Cellulitis of right leg    Acute pulmonary embolism without acute cor pulmonale, unspecified pulmonary embolism type (H)    Acute on chronic diastolic congestive heart failure (H)    NSTEMI (non-ST elevated myocardial infarction) (H)    Dehydration    Hyperkalemia    Acute renal failure, unspecified acute renal failure type (H24)    Hypotension due to hypovolemia    History of pulmonary embolism    Ureteral stone    Urinary tract infection    Hydronephrosis with urinary obstruction due to ureteral calculus    Chronic heart failure  with preserved ejection fraction (H)    Splenomegaly    Hyponatremia    Renal insufficiency    Recurrent UTI's    Hypokalemia    Pyelonephritis, acute    Cellulitis of lower extremity, unspecified laterality    Acute on chronic respiratory failure with hypoxia and hypercapnia (H)    Obesity hypoventilation syndrome (H)    Morbid obesity (H)    Lower GI bleed    Prolonged Q-T interval on ECG    Left leg cellulitis    Acute on chronic respiratory failure with hypoxemia (H)    Acute on chronic congestive heart failure, unspecified heart failure type (H)    Sepsis, due to unspecified organism, unspecified whether acute organ dysfunction present (H)         Past Medical History        Past Medical History:   Diagnosis Date    Acute on chronic diastolic congestive heart failure (H) 02/09/2022    Arthritis 2019     Hips    ASCUS favor benign 08/2015     Neg HPV    Depressive disorder 2010    H/O colposcopy with cervical biopsy 09/14/2015     Bx & ECC - negative    Hypertension 2019    LSIL on Pap smear 07/2014     Neg high risk HPV    Multiple sclerosis (H) 08/29/2005 9/18/200pt dx with MS 2004--dx by neurolgist and is followed by Dr. Harris    Myocardial infarction (H)      Obese      Pneumonia due to infectious organism, unspecified laterality, unspecified part of lung 02/08/2022    Shingles 10/2016    SIRS (systemic inflammatory response syndrome) (H) 03/04/2021    Sleep apnea      UNSPEC CONSTIPATION 09/18/2006 9/18/2006   Has tried otc suppository and otc lax.             Past Surgical History         Past Surgical History:   Procedure Laterality Date    CHOLECYSTECTOMY, LAPOROSCOPIC         Cholecystectomy, Laparoscopic    COLONOSCOPY   2007?     Bathroom issue..results normal    COMBINED CYSTOSCOPY, RETROGRADES, EXCHANGE STENT URETER(S) Right 2/21/2022     Procedure: CYSTOSCOPY, WITH right RETROGRADE PYELOGRAM AND right URETERAL STENT PLACEMENT;  Surgeon: Doyle Palomares MD;  Location: [facility] [place] OR     "OPEN REDUCTION INTERNAL FIXATION TOE(S)   4/13/2012     Procedure:OPEN REDUCTION INTERNAL FIXATION TOE(S); Open reduction internal fixation right proximal fifth metatarsal fracture-   Anes-choice block; Surgeon:LEY, JEFFREY DUANE; Location:[place] OR    PICC TRIPLE LUMEN PLACEMENT   2/21/2022                     ALLERGIES:       Allergies   Allergen Reactions    Nkda [No Known Drug Allergy]          Medications:  Reviewed prior to admission meds as applicable in chart review.  Current meds are reviewed in the EMR listed MAR.     ANTIBIOTICS:    Current:[email or web address]   Prior:   Allergy to:    SH/FH and  travel history(if applicable to consult):  above  REVIEW OF SYSTEMS:  All other systems negative    EXAMINATION:  /76   Pulse 106   Temp 97.8  F (36.6  C) (Oral)   Resp (!) 34   Ht 1.6 m (5' 3\")   Wt (!) 157.9 kg (348 lb 1.7 oz)   SpO2 98%   BMI 61.66 kg/m    Alert, awake  Vitals tabulated above, reviewed  HEENT:plethoric, big time  Neck supple without lymphadenopathy  Sclera clear  CARDIOVASCULAR regular rate and rhythm, no murmur  Lungs CLEAR TO AUSCULTATION   Abdomen soft, NT/ND, absent HEPATOSPLENOMEGALY  Skin normal  Joints normal  Neurologic exam non focal  Wound:  NA        CLINICAL DATABASE FOR---LAB/MICRO/CULTURES/IMAGING STUDIES:  Lab Results   Component Value Date    WBC 12.7 03/16/2024    WBC 7.3 03/07/2021     Lab Results   Component Value Date    RBC 5.75 03/16/2024    RBC 4.91 03/07/2021     Lab Results   Component Value Date    HGB 15.4 03/16/2024    HGB 12.5 03/07/2021     Lab Results   Component Value Date    HCT 48.7My Note      Note Details    Date of Service:         Type:   Consults  Service:   Infectious Disease     Consult Orders        Infectious Diseases IP Consult: Requesting provider? Hospitalist (if different from attending physician); sepsis; Consultant may enter orders: Yes  03/16/24 1229      Physical Therapy Adult IP Consult  03/16/24 1229      Occupational Therapy Adult IP " "Consult  03/16/24 1229      Pharmacy to dose vancomycin  03/16/24 1229      Pharmacy to dose vancomycin  03/16/24 0607      Physical Therapy Adult IP Consult  12/23/23 1307      Occupational Therapy Adult IP Consult  12/23/23 1307    Summary:     Insert SmartText     03/16/2024    HCT 42.8 03/07/2021     No components found for: \"MCT\"  Lab Results   Component Value Date    MCV 85 03/16/2024    MCV 87 03/07/2021     Lab Results   Component Value Date    MCH 26.8 03/16/2024    MCH 25.5 03/07/2021     Lab Results   Component Value Date    MCHC 31.6 03/16/2024    MCHC 29.2 03/07/2021     Lab Results   Component Value Date    RDW 19.8 03/16/2024    RDW 15.6 03/07/2021     Lab Results   Component Value Date     03/16/2024     03/08/2021       Last Comprehensive Metabolic Panel:  Sodium   Date Value Ref Range Status   03/16/2024 140 135 - 145 mmol/L Final     Comment:     Reference intervals for this test were updated on 09/26/2023 to more accurately reflect our healthy population. There may be differences in the flagging of prior results with similar values performed with this method. Interpretation of those prior results can be made in the context of the updated reference intervals.    03/07/2021 139 133 - 144 mmol/L Final     Potassium   Date Value Ref Range Status   03/16/2024 4.1 3.4 - 5.3 mmol/L Final   09/06/2022 3.6 3.5 - 5.0 mmol/L Final   03/07/2021 4.3 3.4 - 5.3 mmol/L Final     Chloride   Date Value Ref Range Status   03/16/2024 103 98 - 107 mmol/L Final   09/06/2022 98 98 - 107 mmol/L Final   03/07/2021 105 94 - 109 mmol/L Final     Carbon Dioxide   Date Value Ref Range Status   03/07/2021 30 20 - 32 mmol/L Final     Carbon Dioxide (CO2)   Date Value Ref Range Status   03/16/2024 25 22 - 29 mmol/L Final   09/06/2022 25 22 - 31 mmol/L Final     Anion Gap   Date Value Ref Range Status   03/16/2024 12 7 - 15 mmol/L Final   09/06/2022 11 5 - 18 mmol/L Final   03/07/2021 4 3 - 14 mmol/L Final " "    Glucose   Date Value Ref Range Status   03/16/2024 114 (H) 70 - 99 mg/dL Final   09/06/2022 94 70 - 125 mg/dL Final   03/07/2021 99 70 - 99 mg/dL Final     GLUCOSE BY METER POCT   Date Value Ref Range Status   12/22/2023 117 (H) 70 - 99 mg/dL Final     Urea Nitrogen   Date Value Ref Range Status   03/16/2024 16.8 6.0 - 20.0 mg/dL Final   09/06/2022 22 8 - 22 mg/dL Final   03/07/2021 12 7 - 30 mg/dL Final     Creatinine   Date Value Ref Range Status   03/16/2024 0.83 0.51 - 0.95 mg/dL Final   03/07/2021 0.62 0.52 - 1.04 mg/dL Final     GFR Estimate   Date Value Ref Range Status   03/16/2024 86 >60 mL/min/1.73m2 Final   03/07/2021 >90 >60 mL/min/[1.73_m2] Final     Comment:     Non  GFR Calc  Starting 12/18/2018, serum creatinine based estimated GFR (eGFR) will be   calculated using the Chronic Kidney Disease Epidemiology Collaboration   (CKD-EPI) equation.       Calcium   Date Value Ref Range Status   03/16/2024 9.7 8.6 - 10.0 mg/dL Final   03/07/2021 9.4 8.5 - 10.1 mg/dL Final       Liver Function Studies -   Recent Labs   Lab Test 03/16/24  0609   PROTTOTAL 8.7*   ALBUMIN 3.8   BILITOTAL 2.1*   ALKPHOS 71   AST 20   ALT 9       No results found for: \"SED\"    CRP Inflammation   Date Value Ref Range Status   03/06/2021 144.0 (H) 0.0 - 8.0 mg/L Final     CRP   Date Value Ref Range Status   02/12/2022 8.5 (H) 0.0-<0.8 mg/dL Final               IMPRESSION:  Profound truncal obesity  Maybe cellultis  NARCISA/Cor pulmonale    PLAN:  On maximal abx  Ideally improve on them.  Not a fixible problem at this body habitus.          NICOLE NEGRON MD  [facility]  Office [phone]      "

## 2024-03-16 NOTE — ED TRIAGE NOTES
Patient report bilateral leg pain ongoing for few hours. States she tends to develop cellulitis in bilateral inner thighs and suspects that that is the case today. Endorses fever ongoing for 14 hours. Patient also endorses flu like symptoms.

## 2024-03-16 NOTE — ED NOTES
Pts on 5 litersper oxymask. Sats are 94% on forehead 0ximeter probe. That seems to be picking up on the oxygen levels better. Pt nauseated so ativan 790 mg given iv per orders.

## 2024-03-16 NOTE — ED NOTES
Female purewick replaced along with the tubing and the cannister. Full at over 1100ml. Changed the soaked pad with 3 assist. Situated per patients comfort.

## 2024-03-16 NOTE — PHARMACY-VANCOMYCIN DOSING SERVICE
Pharmacy Vancomycin Initial Note  Date of Service 2024  Patient's  1975  48 year old, female    Indication: Skin and Soft Tissue Infection    Current estimated CrCl = Estimated Creatinine Clearance: 122.4 mL/min (based on SCr of 0.83 mg/dL).    Creatinine for last 3 days  3/16/2024:  6:09 AM Creatinine 0.83 mg/dL    Recent Vancomycin Level(s) for last 3 days  No results found for requested labs within last 3 days.      Vancomycin IV Administrations (past 72 hours)                     vancomycin (VANCOCIN) 2,500 mg in sodium chloride 0.9 % 500 mL intermittent infusion (mg) 2,500 mg New Bag 24 0702                    Nephrotoxins and other renal medications (From now, onward)      Start     Dose/Rate Route Frequency Ordered Stop    24 0800  bumetanide (BUMEX) tablet 3 mg        Note to Pharmacy: PTA Sig:Take 3 tablets (3 mg) by mouth daily      3 mg Oral DAILY 24 1229      24 1800  vancomycin (VANCOCIN) 1,000 mg in 200 mL dextrose intermittent infusion         1,000 mg  200 mL/hr over 1 Hours Intravenous EVERY 12 HOURS 24 1251              Contrast Orders - past 72 hours (72h ago, onward)      Start     Dose/Rate Route Frequency Stop    24 0930  iopamidol (ISOVUE-370) solution 90 mL  Status:  Discontinued         90 mL Intravenous ONCE 24 0907    24 0900  iopamidol (ISOVUE-370) solution 90 mL         90 mL Intravenous ONCE 24 0907            Insight[employer] Prediction of Planned Initial Vancomycin Regimen  Loading dose: 2500 mg  Regimen: 1000 mg IV every 12 hours.  Start time: 19:02 on 2024  Exposure target: AUC24 (range)400-600 mg/L.hr   AUC24,ss: 438 mg/L.hr  Probability of AUC24 > 400: 57 %  Ctrough,ss: 12 mg/L  Probability of Ctrough,ss > 20: 25 %  Probability of nephrotoxicity (Lodise ARMANDO ): 7 %  Plan:  Start vancomycin 1000 mg IV q12h at 1800 (~11 hours post-loading dose).   Vancomycin monitoring method: AUC  Vancomycin therapeutic  monitoring goal: 400-600 mg*h/L  Pharmacy will check vancomycin levels as appropriate in 1-3 Days.    Serum creatinine levels will be ordered daily for the first week of therapy and at least twice weekly for subsequent weeks.      BELKYS BATISTA RPH

## 2024-03-16 NOTE — ED NOTES
Bed: JNED-05  Expected date:   Expected time:   Means of arrival:   Comments:  [facility]. 49 yo female with leg pain. 89% on 5L.

## 2024-03-16 NOTE — CONSULTS
Care Management Initial Consult    General Information  Assessment completed with: Patient,    Type of CM/SW Visit: Initial Assessment    Primary Care Provider verified and updated as needed: Yes   Readmission within the last 30 days: no previous admission in last 30 days      Reason for Consult: discharge planning  Advance Care Planning: Advance Care Planning Reviewed: verified with patient, no concerns identified          Communication Assessment  Patient's communication style: spoken language (English or Bilingual)    Hearing Difficulty or Deaf: no   Wear Glasses or Blind: no    Cognitive  Cognitive/Neuro/Behavioral: WDL                      Living Environment:   People in home: alone     Current living Arrangements: house (Townhouse)      Able to return to prior arrangements: yes       Family/Social Support:  Care provided by: self, homecare agency  Provides care for: no one  Marital Status: Single  Sibling(s), Other (specify) (Friend)          Description of Support System: Supportive, Involved    Support Assessment: Patient communicates needs well met, Adequate family and caregiver support, Adequate social supports    Current Resources:   Patient receiving home care services: Yes  Skilled Home Care Services: Skilled Nursing, Home Health Aid, Physical Therapy ([facility])  Community Resources: Home Care  Equipment currently used at home: grab bar, tub/shower, shower chair, walker, rolling, wheelchair, manual  Supplies currently used at home: Incontinence Supplies, Oxygen Tubing/Supplies (CPAP)    Employment/Financial:  Employment Status: disabled        Financial Concerns:             Does the patient's insurance plan have a 3 day qualifying hospital stay waiver?  No    Lifestyle & Psychosocial Needs:  Social Determinants of Health     Food Insecurity: Low Risk  (9/25/2023)    Food Insecurity     Within the past 12 months, did you worry that your food would run out before you got money to buy more?: No      Within the past 12 months, did the food you bought just not last and you didn t have money to get more?: No   Depression: Not at risk (2/7/2024)    PHQ-2     PHQ-2 Score: 2   Housing Stability: Low Risk  (9/25/2023)    Housing Stability     Do you have housing? : Yes     Are you worried about losing your housing?: No   Tobacco Use: Low Risk  (2/7/2024)    Patient History     Smoking Tobacco Use: Never     Smokeless Tobacco Use: Never     Passive Exposure: Not on file   Financial Resource Strain: Low Risk  (9/25/2023)    Financial Resource Strain     Within the past 12 months, have you or your family members you live with been unable to get utilities (heat, electricity) when it was really needed?: No   Alcohol Use: Not on file   Transportation Needs: High Risk (9/25/2023)    Transportation Needs     Within the past 12 months, has lack of transportation kept you from medical appointments, getting your medicines, non-medical meetings or appointments, work, or from getting things that you need?: Yes   Physical Activity: Inactive (3/1/2022)    Exercise Vital Sign     Days of Exercise per Week: 0 days     Minutes of Exercise per Session: 0 min   Interpersonal Safety: Low Risk  (9/27/2023)    Interpersonal Safety     Do you feel physically and emotionally safe where you currently live?: Yes     Within the past 12 months, have you been hit, slapped, kicked or otherwise physically hurt by someone?: No     Within the past 12 months, have you been humiliated or emotionally abused in other ways by your partner or ex-partner?: No   Stress: No Stress Concern Present (12/4/2020)    [employer] - Occupational Stress Questionnaire     Feeling of Stress : Only a little   Social Connections: Unknown (3/1/2022)    Social Connection and Isolation Panel [NHANES]     Frequency of Communication with Friends and Family: Twice a week     Frequency of Social Gatherings with Friends and Family: Twice a week      Attends Adventism Services: Not on file     Active Member of Clubs or Organizations: Not on file     Attends Club or Organization Meetings: Not on file     Marital Status: Not on file       Functional Status:  Prior to admission patient needed assistance:   Dependent ADLs:: Ambulation-walker, Bathing  Dependent IADLs:: Independent, Transportation  Assesssment of Functional Status: Not at  functional baseline    Mental Health Status:          Chemical Dependency Status:                Values/Beliefs:  Spiritual, Cultural Beliefs, Adventism Practices, Values that affect care:                 Additional Information:  Met with patient at bedside to for initial assessment. Introduced self and care management role.  Patient lives alone in a townhouse. Need assist with ADLs/IADLs d/t to bilateral leg lymphedema.  Has siblings who assist with transportation and other IDLs.  On disability. Has walker, WC, shower chair, commode, home O2, CPAP.     Currently has [facility] care services with [facility] (RN, HA, PT).     PT/OT consults pending. CM to follow hospital progression, treatment team recommendations and assist with discharge planning as needed.     Goal is to return home. Family will transport.     Gregoria Thomas RN

## 2024-03-16 NOTE — ED PROVIDER NOTES
EMERGENCY DEPARTMENT ENCOUNTER      NAME: Bess Enamorado  AGE: 48 year old female  YOB: 1975  MRN: [id number]  EVALUATION DATE & TIME: 3/16/2024  5:52 AM    PCP: Mikala Luna    ED PROVIDER: Delmis Birmingham M.D.      CHIEF COMPLAINT     Chief Complaint   Patient presents with    Breathing Problem    Leg Pain         FINAL IMPRESSION:     1. Sepsis, due to unspecified organism, unspecified whether acute organ dysfunction present (H)    2. Left leg cellulitis    3. Prolonged Q-T interval on ECG    4. Acute on chronic respiratory failure with hypoxemia (H)    5. Acute on chronic congestive heart failure, unspecified heart failure type (H)    6. Morbid obesity (H)    7. Lower GI bleed    8. Hepatic steatosis    9. Dermoid cyst of ovary, right          MEDICAL DECISION MAKING:       Pertinent Labs & Imaging studies reviewed. (See chart for details)    48 year old female presents to the Emergency Department for evaluation of fever    History  Supplemental history from EMS  External Record(s) review as documented below    Exam  Nontoxic looks like she does not feel well cooperative and pleasant dry mucous membrane mild conjunctiva injection tachycardic protuberant abdomen there is erythema on the inner thighs without crepitus.  She has large growths on the inner thighs.    Differential Diagnosis include but not limited to sepsis secondary to UTI COVID-pneumonia cellulitis necrotizing fasciitis congestive heart failure myocarditis pericarditis PE among others.    Vital Signs: Febrile tachycardic  EKG:  Imaging: I independently interpreted chest x-ray no consolidation..Formal read by radiologist.  Home meds: Reviewed  ED meds: Vancomycin and Zosyn  Fluids: Normal saline  Labs:  K 4.1  Cr 0.83  Wbc 12.7  Hgb 15.4  platelets 187    Clinical Impression and Decision Making    48-year-old female history of hypoventilation syndrome on 2 L of oxygen at night and 1 during the day presents complaining of fevers and  chills.  Started yesterday.  Some cough.  Some phlegm.  In the past this has been secondary from some type of infection including cellulitis.  Reports wound care nurse came to evaluate her and she has some bleeding per rectum might have been hemorrhoids.    On exam she is morbidly obese tachycardic febrile cooperative and pleasant very dry mucous membranes.  Digital rectal examination reveals external hemorrhoid nonbleeding.  There is bright red blood per rectum no hemorrhaging.  There is chronic erythema bilateral lower extremities but there is left greater than right.    IV established patient placed in postop cardio blood pressure monitors.    Panculture and starting broad-spectrum antibiotics.    Received antibiotics via EMS.    Elevated BNP at 3475.  Patient has a history of diastolic congestive heart failure.  Normal lactic acid therefore we will be judicious with fluids.    Reevaluated.  12 L on Oxymask.  No longer plethoric tolerating well.  Will gently hydrate.  Given the increased oxygen demand we will also scan chest and abdomen.    Patient reevaluated.  Nauseated and vomiting.  No hematemesis.  Given the prolonged QT we will do Ativan.  Respiratory technician at bedside.    Labs normal renal function.  Elevated white blood cell count of 12.7 with a left shift and normal hemoglobin.  Elevated troponin at 37.  EKG without ST segment elevation.  Viral markers are negative.    UA with no signs of infection.    For Hypoxia CT chest was done.  No evidence of pulmonary embolism.  No ureteral stones found    Patient becomes cyanotic very easily when she is not on oxygen but on 2 L of her regular oxygen via OxiMax she denies feeling well.  She was updated.    Spoke with hospitalist will assess patient cardiac telemetry inpatient.    Admitted stable condition with guarded diagnosis..  In addition to the work out documented, I considered the following work lumbar puncture.  There is no evidence of encephalopathy.   She is awake alert.           Medical Decision Making    History:  Supplemental history from: Documented in chart  External Record(s) reviewed: Documented in chart    Work Up:  Chart documentation includes differential considered and any EKGs or imaging independently interpreted by provider, where specified.  In additional to work up documented, I considered the following work up: Documented in chart, if applicable.    External consultation:  Discussion of management with another provider: Documented in chart, if applicable    Complicating factors:  Care impacted by chronic illness: Heart Disease and Hypertension  Care affected by social determinants of health: N/A    Disposition considerations: Admit.      Review of External Records  Hospital/Clinic: [facility]  Date: 2/7/24  Heart failure, obesity, on 1L oxygen at home, lymphedema, multiple sclerosis.      External Consultation  Hospitalist, Dr. Morris      ED COURSE     6:00 AM I met with the patient for the initial interview and physical examination. Discussed plan for treatment and workup in the ED.    6:15 AM reevaluated and updated  6:48 AM reevaluated and updated.  8:06 AM I rechecked and updated the patient.   8:25 AM reevaluated and updated.  9:19 AM I discussed the case with hospitalist, Dr Morris, who accepts the patient. Cardiac telemetry, inpatient.   11:05 AM reevaluated and updated.    At the conclusion of the encounter I discussed the results of all of the tests and the disposition. The questions were answered. The patient acknowledged understanding and was agreeable with the care plan.         Critical Care     Performed by: Dr Delmis Birmingham  Authorized by: Dr Delmis Birmingham  Total critical care time: 35 minutes  Critical care was necessary to treat or prevent imminent or life-threatening deterioration of the following conditions: Sepsis hypoxia  Critical care was time spent personally by me on the following activities: development of  treatment plan with patient or surrogate, discussions with consultants, examination of patient, evaluation of patient's response to treatment, obtaining history from patient or surrogate, ordering and performing treatments and interventions, ordering and review of laboratory studies, ordering and review of radiographic studies, re-evaluation of patient's condition and monitoring for potential decompensation.  Critical care time was exclusive of separately billable procedures and treating other patients.   MEDICATIONS GIVEN IN THE EMERGENCY:     Medications   piperacillin-tazobactam (ZOSYN) 3.375 g vial to attach to  mL bag (0 g Intravenous Stopped 3/16/24 0702)   vancomycin (VANCOCIN) 2,500 mg in sodium chloride 0.9 % 500 mL intermittent infusion (0 mg Intravenous Stopped 3/16/24 0933)   bumetanide (BUMEX) injection 2 mg (2 mg Intravenous $Given 3/16/24 0739)   LORazepam (ATIVAN) injection 0.5 mg (0.5 mg Intravenous $Given 3/16/24 0810)   iopamidol (ISOVUE-370) solution 90 mL (90 mLs Intravenous $Given 3/16/24 0907)       NEW PRESCRIPTIONS STARTED AT TODAY'S ER VISIT     New Prescriptions    No medications on file          =================================================================    HPI     Patient information was obtained from: patient and EMS.    Use of : N/A         Bess Enamorado is a 48 year old female with pertinent history of hypertension, congestive heart failure, pulmonary embolism, obesity, edema, multiple sclerosis, NSTEMI who presents by EMS for evaluation of a breathing problem and leg pain.    Per EMS: patient called today with flu symptoms. She endorses chills, fever last night, and bilateral lower extremity pain that may be attributed to cellulitis and worsens when she uses the bathroom or lays down. EMS administered tylenol.    The patient presents with a subjective fever and chills since 2300 last night (3/15), cough that may be attributed to hypernatremia, nausea, thick  "spit, dry mouth, and jagged teeth that have cut her mouth. She also endorses \"getting the gunk out\". She reports that she receives home care every other week and called them in on Wednesday (3/13) due to anal bleeding. Patient reports pain when she poops, redness, and constipation that may be attributed to hemorrhoids. Last bowel movement was 5 days ago (3/11).    Patient notes that she felt okay prior to yesterday. She is on 2L supplemental oxygen at home with her CPAP at night and 1L oxygen during the day.    Patient denies runny nose, sore throat, abdominal pain, diarrhea, vomiting, dysuria, smoking, alcohol use, drug use, sick contacts, recent falls, and any other symptoms or complaints at this time.     Discharge summary 12/21/2023    Hospital Course  Bess Enamorado is a 48 year old female admitted on 12/21/2023 for CHF exacerbation and acute pyelonephritis.      HFpEF exacerbation:  Patient presented with shortness of breath due to diuretic noncompliance. She normally takes bumex 3 mg daily. However, she stopped taking it for a few days prior to admission due to urinary symptoms from acute pyelonephritis.   Chest x-ray findings were consistent with pulmonary edema. Echocardiogram showed LVEF 50-55% without significant valvular heart disease, similar to findings on 5/13/22. Patient received diuresis with IV Lasix. Her SOB resolved.      Acute on chronic hypoxemic respiratory failure:  Patient initially needed 4 LPM supplemental oxygen. It was able to get weaned down to 2 LPM. However, it can not be further tapered down. Patient's acute respiratory failure is due to CHF exacerbation. However, she does have chronic hypoxia. Patient reports that when she checked her oxygen saturation at home, often it is as low as 87 on room air. D dimer was only borderline elevated and pulmonary embolism is unlikely. Per chart review, patient was placed on home oxygen back in 2021 after hospitalization due to NARCISA and obesity " hypoventilation. Patient reports that she eventually got weaned off. Home oxygen evaluation was done and patient qualifies for home oxygen.      Acute right pyelonephritis:  Patient presented with urinary urgency and right flank pain for 4 days. CT scan findings were suggestive of ascending UTI. No urolithiasis or hydronephrosis. Urine culture showed E coli. Patient was treated with Ceftriaxone and Cefdinir. Urinary symptoms resolved.      Bilateral lower extremity cellulitis:  Patient noticed that her bilateral inner thigh had become more erythematous. She was treated with Ceftriaxone and the erythema resolved. MRSA screen was negative.  REVIEW OF SYSTEMS   Review of Systems   Constitutional:  Positive for chills and fever.   HENT:  Positive for mouth sores. Negative for rhinorrhea and sore throat.         Dry mouth.   Gastrointestinal:  Positive for anal bleeding, constipation and nausea. Negative for abdominal pain, diarrhea and vomiting.   Genitourinary:  Negative for dysuria.   Musculoskeletal:  Positive for myalgias (bilateral lower extremity).   All other systems reviewed and are negative.       PAST MEDICAL HISTORY:     Past Medical History:   Diagnosis Date    Acute on chronic diastolic congestive heart failure (H) 02/09/2022    Arthritis 2019    Hips    ASCUS favor benign 08/2015    Neg HPV    Depressive disorder 2010    H/O colposcopy with cervical biopsy 09/14/2015    Bx & ECC - negative    Hypertension 2019    LSIL on Pap smear 07/2014    Neg high risk HPV    Multiple sclerosis (H) 08/29/2005 9/18/200pt dx with MS 2004--dx by neurolgist and is followed by Dr. Harris    Myocardial infarction (H)     Obese     Pneumonia due to infectious organism, unspecified laterality, unspecified part of lung 02/08/2022    Shingles 10/2016    SIRS (systemic inflammatory response syndrome) (H) 03/04/2021    Sleep apnea     UNSPEC CONSTIPATION 09/18/2006 9/18/2006   Has tried otc suppository and otc lax.         PAST SURGICAL HISTORY:     Past Surgical History:   Procedure Laterality Date    CHOLECYSTECTOMY, LAPOROSCOPIC      Cholecystectomy, Laparoscopic    COLONOSCOPY  2007?    Bathroom issue..results normal    COMBINED CYSTOSCOPY, RETROGRADES, EXCHANGE STENT URETER(S) Right 2/21/2022    Procedure: CYSTOSCOPY, WITH right RETROGRADE PYELOGRAM AND right URETERAL STENT PLACEMENT;  Surgeon: Doyle Palomares MD;  Location: [facility] OR    OPEN REDUCTION INTERNAL FIXATION TOE(S)  4/13/2012    Procedure:OPEN REDUCTION INTERNAL FIXATION TOE(S); Open reduction internal fixation right proximal fifth metatarsal fracture-   Anes-choice block; Surgeon:LEY, JEFFREY DUANE; Location:[facility] OR    PICC TRIPLE LUMEN PLACEMENT  2/21/2022              CURRENT MEDICATIONS:   acetaminophen (TYLENOL) 650 MG CR tablet  aspirin (ASPIRIN LOW DOSE) 81 MG EC tablet  bumetanide (BUMEX) 1 MG tablet  metoprolol succinate ER (TOPROL XL) 25 MG 24 hr tablet  miconazole (MICATIN) 2 % AERP powder  sertraline (ZOLOFT) 100 MG tablet         ALLERGIES:     Allergies   Allergen Reactions    Nkda [No Known Drug Allergy]        FAMILY HISTORY:     Family History   Problem Relation Age of Onset    Neurologic Disorder Father         MS    Heart Disease Father         irregular heart rate    Diabetes Father     Melanoma Father     Sleep Apnea Father     Other Cancer Father     Cancer Maternal Grandfather         lung    Other Cancer Maternal Grandfather     Cancer Paternal Grandmother         stomach    Other Cancer Paternal Grandmother     Cancer Mother     Other Cancer Mother     Thyroid Disease Mother     Gynecology Maternal Aunt         PCOS    Hypertension Maternal Grandmother     Anxiety Disorder Maternal Grandmother     Thyroid Disease Maternal Grandmother     Anxiety Disorder Sister        SOCIAL HISTORY:     Social History     Socioeconomic History    Marital status: Single   Occupational History     Employer: [employer]   Tobacco Use    Smoking  status: Never    Smokeless tobacco: Never   Vaping Use    Vaping Use: Never used   Substance and Sexual Activity    Alcohol use: Yes     Comment: social    Drug use: No    Sexual activity: Not Currently     Partners: Male     Birth control/protection: Pill   Other Topics Concern    Parent/sibling w/ CABG, MI or angioplasty before 65F 55M? No   Social History Narrative    , 3rd-5th grade     Social Determinants of Health     Financial Resource Strain: Low Risk  (9/25/2023)    Financial Resource Strain     Within the past 12 months, have you or your family members you live with been unable to get utilities (heat, electricity) when it was really needed?: No   Food Insecurity: Low Risk  (9/25/2023)    Food Insecurity     Within the past 12 months, did you worry that your food would run out before you got money to buy more?: No     Within the past 12 months, did the food you bought just not last and you didn t have money to get more?: No   Transportation Needs: High Risk (9/25/2023)    Transportation Needs     Within the past 12 months, has lack of transportation kept you from medical appointments, getting your medicines, non-medical meetings or appointments, work, or from getting things that you need?: Yes   Physical Activity: Inactive (3/1/2022)    Exercise Vital Sign     Days of Exercise per Week: 0 days     Minutes of Exercise per Session: 0 min   Stress: No Stress Concern Present (12/4/2020)    [employer] [place] [employer] - Occupational Stress Questionnaire     Feeling of Stress : Only a little   Social Connections: Unknown (3/1/2022)    Social Connection and Isolation Panel [NHANES]     Frequency of Communication with Friends and Family: Twice a week     Frequency of Social Gatherings with Friends and Family: Twice a week   Interpersonal Safety: Low Risk  (9/27/2023)    Interpersonal Safety     Do you feel physically and emotionally safe where you currently live?: Yes      "Within the past 12 months, have you been hit, slapped, kicked or otherwise physically hurt by someone?: No     Within the past 12 months, have you been humiliated or emotionally abused in other ways by your partner or ex-partner?: No   Housing Stability: Low Risk  (9/25/2023)    Housing Stability     Do you have housing? : Yes     Are you worried about losing your housing?: No       VITALS:   BP (!) 141/79   Pulse 119   Temp (!) 100.7  F (38.2  C) (Oral)   Resp (!) 57   Ht 1.6 m (5' 3\")   Wt (!) 157.9 kg (348 lb 1.7 oz)   SpO2 90%   BMI 61.66 kg/m      PHYSICAL EXAM     Physical Exam  Vitals and nursing note reviewed. Exam conducted with a chaperone present.   Constitutional:       General: She is not in acute distress.     Appearance: She is obese. She is ill-appearing. She is not toxic-appearing or diaphoretic.   Cardiovascular:      Rate and Rhythm: Tachycardia present.   Skin:     General: Skin is warm.      Findings: Erythema present.   Neurological:      Mental Status: She is alert and oriented to person, place, and time.         Physical Exam   Constitutional: Ill-appearing, cooperative and present.    Head: Atraumatic.     Nose: Nose normal.     Mouth/Throat: Oropharynx is clear. Dry mucus membranes    Eyes: EOM are normal. Pupils are equal, round, and reactive to light.     Ears: Bilateral pearly white tympanic membranes.    Neck: Normal range of motion. Neck supple.     Cardiovascular: Tachycardic, regular rhythm and normal heart sounds.      Pulmonary/Chest: Normal effort  and breath sounds normal.     Abdominal: soft nontender.    Musculoskeletal: Normal range of motion. Bilateral lower extremity erythema.    Neurological: Moves upper and lower extremities equally.    Lymphatics: no edema    : NA    Skin: Skin is warm and dry.     Psychiatric: Normal mood and affect. Behavior is normal.       LAB:     All pertinent labs reviewed and interpreted.  Labs Ordered and Resulted from Time of ED Arrival " to Time of ED Departure   BASIC METABOLIC PANEL - Abnormal       Result Value    Sodium 140      Potassium 4.1      Chloride 103      Carbon Dioxide (CO2) 25      Anion Gap 12      Urea Nitrogen 16.8      Creatinine 0.83      GFR Estimate 86      Calcium 9.7      Glucose 114 (*)    HEPATIC FUNCTION PANEL - Abnormal    Protein Total 8.7 (*)     Albumin 3.8      Bilirubin Total 2.1 (*)     Alkaline Phosphatase 71      AST 20      ALT 9      Bilirubin Direct 0.50 (*)    ROUTINE UA WITH MICROSCOPIC REFLEX TO CULTURE - Abnormal    Color Urine Yellow      Appearance Urine Slightly Cloudy (*)     Glucose Urine Negative      Bilirubin Urine Negative      Ketones Urine Negative      Specific Gravity Urine 1.026      Blood Urine 0.1 mg/dL (*)     pH Urine 6.5      Protein Albumin Urine 50 (*)     Urobilinogen Urine 3.0 (*)     Nitrite Urine Negative      Leukocyte Esterase Urine Negative      Uric Acid Crystals Urine Moderate (*)     RBC Urine 3 (*)     WBC Urine 1      Squamous Epithelials Urine 3 (*)    NT PROBNP INPATIENT - Abnormal    N terminal Pro BNP Inpatient 3,475 (*)    TROPONIN T, HIGH SENSITIVITY - Abnormal    Troponin T, High Sensitivity 37 (*)    CBC WITH PLATELETS AND DIFFERENTIAL - Abnormal    WBC Count 12.7 (*)     RBC Count 5.75 (*)     Hemoglobin 15.4      Hematocrit 48.7 (*)     MCV 85      MCH 26.8      MCHC 31.6      RDW 19.8 (*)     Platelet Count 189      % Neutrophils 87      % Lymphocytes 7      % Monocytes 5      % Eosinophils 0      % Basophils 0      % Immature Granulocytes 1      NRBCs per 100 WBC 0      Absolute Neutrophils 11.1 (*)     Absolute Lymphocytes 0.9      Absolute Monocytes 0.6      Absolute Eosinophils 0.0      Absolute Basophils 0.1      Absolute Immature Granulocytes 0.1      Absolute NRBCs 0.0     LIPASE - Normal    Lipase 40     INFLUENZA A/B, RSV, & SARS-COV2 PCR - Normal    Influenza A PCR Negative      Influenza B PCR Negative      RSV PCR Negative      SARS CoV2 PCR Negative      LACTIC ACID WHOLE BLOOD - Normal    Lactic Acid 1.9     TYPE AND SCREEN, ADULT    ABO/RH(D) A NEG      Antibody Screen Negative      SPECIMEN EXPIRATION DATE [id number]     BLOOD CULTURE   BLOOD CULTURE   ABO/RH TYPE AND SCREEN        RADIOLOGY:     Reviewed all pertinent imaging. Please see official radiology report.  CT Abdomen Pelvis w Contrast   Final Result   IMPRESSION:    Chest:   1.  No pulmonary embolus.   2.  The main pulmonary artery is dilated up to 4.4 cm suggestive of pulmonary hypertension.   3.  Mild mosaic attenuation of the lungs likely related to the exam being obtained in the expiratory phase of respiration. Other considerations include small airways disease and/or air trapping.   4.  Mildly enlarged right lower paratracheal lymph node may be reactive.      Abdomen and pelvis:   1.  No acute intrapelvic findings.   2.  Punctate nonobstructing right renal calculus.   3.  Hepatic steatosis and hepatomegaly.   4.  The liver is dysmorphic suggestive of fibrosis and/or early cirrhosis. Additionally, there is recanalization of the paraumbilical vein suggestive of portal hypertension.   5.  Similar borderline enlarged retroperitoneal, iliac, and inguinal lymph nodes.   6.  Again seen is the large 9 cm mobile ovarian dermoid in the right lower quadrant pelvis arising from the left adnexa.         CT Chest Pulmonary Embolism w Contrast   Final Result   IMPRESSION:    Chest:   1.  No pulmonary embolus.   2.  The main pulmonary artery is dilated up to 4.4 cm suggestive of pulmonary hypertension.   3.  Mild mosaic attenuation of the lungs likely related to the exam being obtained in the expiratory phase of respiration. Other considerations include small airways disease and/or air trapping.   4.  Mildly enlarged right lower paratracheal lymph node may be reactive.      Abdomen and pelvis:   1.  No acute intrapelvic findings.   2.  Punctate nonobstructing right renal calculus.   3.  Hepatic steatosis  and hepatomegaly.   4.  The liver is dysmorphic suggestive of fibrosis and/or early cirrhosis. Additionally, there is recanalization of the paraumbilical vein suggestive of portal hypertension.   5.  Similar borderline enlarged retroperitoneal, iliac, and inguinal lymph nodes.   6.  Again seen is the large 9 cm mobile ovarian dermoid in the right lower quadrant pelvis arising from the left adnexa.         XR Chest Port 1 View   Final Result   IMPRESSION: No significant interval change. Cardiac enlargement and mild pulmonary venous congestion. Mild elevation of the right hemidiaphragm. No pneumothorax.              EKG:     EKG #1  Sinus tachycardia normal anterior progression prolonged QT at 638    Time:[id number]    Ventricular rate 117 bmp  Axis normal  ME interval ms  QRS duration 100 ms  QT//638 ms    Compared to previous EKG on December 21, 20 23rd poor anterior progression.  Low voltage incomplete right bundle branch block was seen before.  QTc was 497.  I have independently reviewed and interpreted the EKG(s) documented above.      PROCEDURES:     Procedures      I, Lilia Paris, am serving as a scribe to document services personally performed by Dr. Birmingham based on my observation and the provider's statements to me. I, Delmis Birmingham MD attest that Lilia Paris is acting in a scribe capacity, has observed my performance of the services and has documented them in accordance with my direction.    Delmis Birmingham M.D.  Emergency Medicine  [facility] EMERGENCY DEPARTMENT  [address] [address]  [phone]  Dept: [phone]       Delmis Birmingham MD  03/16/24 5653

## 2024-03-17 ENCOUNTER — APPOINTMENT (OUTPATIENT)
Dept: CARDIOLOGY | Facility: HOSPITAL | Age: 49
DRG: 871 | End: 2024-03-17
Attending: INTERNAL MEDICINE
Payer: MEDICARE

## 2024-03-17 ENCOUNTER — APPOINTMENT (OUTPATIENT)
Dept: OCCUPATIONAL THERAPY | Facility: HOSPITAL | Age: 49
DRG: 871 | End: 2024-03-17
Attending: INTERNAL MEDICINE
Payer: MEDICARE

## 2024-03-17 ENCOUNTER — APPOINTMENT (OUTPATIENT)
Dept: PHYSICAL THERAPY | Facility: HOSPITAL | Age: 49
DRG: 871 | End: 2024-03-17
Attending: INTERNAL MEDICINE
Payer: MEDICARE

## 2024-03-17 LAB
ANION GAP SERPL CALCULATED.3IONS-SCNC: 7 MMOL/L (ref 7–15)
BUN SERPL-MCNC: 14.1 MG/DL (ref 6–20)
CALCIUM SERPL-MCNC: 9.1 MG/DL (ref 8.6–10)
CHLORIDE SERPL-SCNC: 105 MMOL/L (ref 98–107)
CREAT SERPL-MCNC: 0.77 MG/DL (ref 0.51–0.95)
DEPRECATED HCO3 PLAS-SCNC: 26 MMOL/L (ref 22–29)
EGFRCR SERPLBLD CKD-EPI 2021: >90 ML/MIN/1.73M2
ENTEROCOCCUS FAECALIS: NOT DETECTED
ENTEROCOCCUS FAECIUM: NOT DETECTED
ERYTHROCYTE [DISTWIDTH] IN BLOOD BY AUTOMATED COUNT: 19.2 % (ref 10–15)
GLUCOSE SERPL-MCNC: 94 MG/DL (ref 70–99)
HCT VFR BLD AUTO: 45.3 % (ref 35–47)
HGB BLD-MCNC: 13.9 G/DL (ref 11.7–15.7)
LISTERIA SPECIES (DETECTED/NOT DETECTED): NOT DETECTED
LVEF ECHO: NORMAL
MCH RBC QN AUTO: 26.9 PG (ref 26.5–33)
MCHC RBC AUTO-ENTMCNC: 30.7 G/DL (ref 31.5–36.5)
MCV RBC AUTO: 88 FL (ref 78–100)
PLATELET # BLD AUTO: 156 10E3/UL (ref 150–450)
POTASSIUM SERPL-SCNC: 3.9 MMOL/L (ref 3.4–5.3)
RBC # BLD AUTO: 5.16 10E6/UL (ref 3.8–5.2)
SODIUM SERPL-SCNC: 138 MMOL/L (ref 135–145)
STAPHYLOCOCCUS AUREUS: NOT DETECTED
STAPHYLOCOCCUS EPIDERMIDIS: NOT DETECTED
STAPHYLOCOCCUS LUGDUNENSIS: DETECTED
STREPTOCOCCUS AGALACTIAE: NOT DETECTED
STREPTOCOCCUS ANGINOSUS GROUP: NOT DETECTED
STREPTOCOCCUS PNEUMONIAE: NOT DETECTED
STREPTOCOCCUS PYOGENES: NOT DETECTED
STREPTOCOCCUS SPECIES: NOT DETECTED
VANCOMYCIN SERPL-MCNC: 13.1 UG/ML
WBC # BLD AUTO: 4.7 10E3/UL (ref 4–11)

## 2024-03-17 PROCEDURE — 999N000157 HC STATISTIC RCP TIME EA 10 MIN

## 2024-03-17 PROCEDURE — 97535 SELF CARE MNGMENT TRAINING: CPT | Mod: GO

## 2024-03-17 PROCEDURE — 97530 THERAPEUTIC ACTIVITIES: CPT | Mod: GP

## 2024-03-17 PROCEDURE — 210N000001 HC R&B IMCU HEART CARE

## 2024-03-17 PROCEDURE — 94660 CPAP INITIATION&MGMT: CPT

## 2024-03-17 PROCEDURE — C8929 TTE W OR WO FOL WCON,DOPPLER: HCPCS

## 2024-03-17 PROCEDURE — 80048 BASIC METABOLIC PNL TOTAL CA: CPT | Performed by: INTERNAL MEDICINE

## 2024-03-17 PROCEDURE — 99233 SBSQ HOSP IP/OBS HIGH 50: CPT | Performed by: INTERNAL MEDICINE

## 2024-03-17 PROCEDURE — 97162 PT EVAL MOD COMPLEX 30 MIN: CPT | Mod: GP

## 2024-03-17 PROCEDURE — 85014 HEMATOCRIT: CPT | Performed by: INTERNAL MEDICINE

## 2024-03-17 PROCEDURE — 80202 ASSAY OF VANCOMYCIN: CPT | Performed by: INTERNAL MEDICINE

## 2024-03-17 PROCEDURE — 99232 SBSQ HOSP IP/OBS MODERATE 35: CPT | Performed by: INTERNAL MEDICINE

## 2024-03-17 PROCEDURE — 250N000013 HC RX MED GY IP 250 OP 250 PS 637: Performed by: INTERNAL MEDICINE

## 2024-03-17 PROCEDURE — 250N000011 HC RX IP 250 OP 636: Mod: JZ | Performed by: INTERNAL MEDICINE

## 2024-03-17 PROCEDURE — 258N000003 HC RX IP 258 OP 636: Performed by: INTERNAL MEDICINE

## 2024-03-17 PROCEDURE — 93306 TTE W/DOPPLER COMPLETE: CPT | Mod: 26 | Performed by: INTERNAL MEDICINE

## 2024-03-17 PROCEDURE — 97166 OT EVAL MOD COMPLEX 45 MIN: CPT | Mod: GO

## 2024-03-17 PROCEDURE — 255N000002 HC RX 255 OP 636: Performed by: INTERNAL MEDICINE

## 2024-03-17 PROCEDURE — 36415 COLL VENOUS BLD VENIPUNCTURE: CPT | Performed by: INTERNAL MEDICINE

## 2024-03-17 PROCEDURE — 87040 BLOOD CULTURE FOR BACTERIA: CPT | Performed by: INTERNAL MEDICINE

## 2024-03-17 RX ADMIN — VANCOMYCIN HYDROCHLORIDE 1000 MG: 1 INJECTION, SOLUTION INTRAVENOUS at 06:36

## 2024-03-17 RX ADMIN — MEROPENEM 1 G: 1 INJECTION, POWDER, FOR SOLUTION INTRAVENOUS at 05:27

## 2024-03-17 RX ADMIN — BUMETANIDE 3 MG: 2 TABLET ORAL at 07:55

## 2024-03-17 RX ADMIN — PERFLUTREN 3 ML: 6.52 INJECTION, SUSPENSION INTRAVENOUS at 12:30

## 2024-03-17 RX ADMIN — ASPIRIN 81 MG: 81 TABLET, COATED ORAL at 07:55

## 2024-03-17 RX ADMIN — MEROPENEM 1 G: 1 INJECTION, POWDER, FOR SOLUTION INTRAVENOUS at 14:57

## 2024-03-17 RX ADMIN — METOPROLOL SUCCINATE 25 MG: 25 TABLET, EXTENDED RELEASE ORAL at 07:55

## 2024-03-17 RX ADMIN — VANCOMYCIN HYDROCHLORIDE 1500 MG: 5 INJECTION, POWDER, LYOPHILIZED, FOR SOLUTION INTRAVENOUS at 18:26

## 2024-03-17 RX ADMIN — MEROPENEM 1 G: 1 INJECTION, POWDER, FOR SOLUTION INTRAVENOUS at 22:02

## 2024-03-17 ASSESSMENT — ACTIVITIES OF DAILY LIVING (ADL)
ADLS_ACUITY_SCORE: 54
ADLS_ACUITY_SCORE: 50
ADLS_ACUITY_SCORE: 54
ADLS_ACUITY_SCORE: 50
ADLS_ACUITY_SCORE: 47
ADLS_ACUITY_SCORE: 54
ADLS_ACUITY_SCORE: 54
ADLS_ACUITY_SCORE: 50
ADLS_ACUITY_SCORE: 50
ADLS_ACUITY_SCORE: 54
ADLS_ACUITY_SCORE: 50

## 2024-03-17 NOTE — PHARMACY-VANCOMYCIN DOSING SERVICE
Pharmacy Vancomycin Note  Date of Service 2024  Patient's  1975   48 year old, female    Indication: Skin and Soft Tissue Infection  Day of Therapy: 2  Current vancomycin regimen:  1000 mg IV q12h  Current vancomycin monitoring method: AUC  Current vancomycin therapeutic monitoring goal: 400-600 mg*h/L    InsightRX Prediction of Current Vancomycin Regimen  Loading dose: N/A  Regimen: 1000 mg IV every 12 hours.  Start time: 18:36 on 2024  Exposure target: AUC24 (range)400-600 mg/L.hr   AUC24,ss: 365 mg/L.hr  Probability of AUC24 > 400: 30 %  Ctrough,ss: 8.5 mg/L  Probability of Ctrough,ss > 20: 0 %  Probability of nephrotoxicity (Lodise ARMANDO ): 5 %      Current estimated CrCl = Estimated Creatinine Clearance: 127.4 mL/min (based on SCr of 0.77 mg/dL).    Creatinine for last 3 days  3/16/2024:  6:09 AM Creatinine 0.83 mg/dL  3/17/2024:  5:34 AM Creatinine 0.77 mg/dL    Recent Vancomycin Levels (past 3 days)  3/17/2024:  3:13 PM Vancomycin 13.1 ug/mL    Vancomycin IV Administrations (past 72 hours)                     vancomycin (VANCOCIN) 1,000 mg in 200 mL dextrose intermittent infusion (mg) 1,000 mg New Bag 24 0636     1,000 mg New Bag 24 1927    vancomycin (VANCOCIN) 2,500 mg in sodium chloride 0.9 % 500 mL intermittent infusion (mg) 2,500 mg New Bag 24 0702                    Nephrotoxins and other renal medications (From now, onward)      Start     Dose/Rate Route Frequency Ordered Stop    24 1800  vancomycin (VANCOCIN) 1,500 mg in sodium chloride 0.9 % 250 mL intermittent infusion         1,500 mg  over 90 Minutes Intravenous EVERY 12 HOURS 24 1621      24 0800  bumetanide (BUMEX) tablet 3 mg        Note to Pharmacy: PTA Sig:Take 3 tablets (3 mg) by mouth daily      3 mg Oral DAILY 24 1229                 Contrast Orders - past 72 hours (72h ago, onward)      Start     Dose/Rate Route Frequency Stop    24 1230  perflutren lipid  microsphere (DEFINITY) injection SUSP 3 mL         3 mL Intravenous ONCE 03/17/24 1230    03/16/24 0930  iopamidol (ISOVUE-370) solution 90 mL  Status:  Discontinued         90 mL Intravenous ONCE 03/16/24 0907    03/16/24 0900  iopamidol (ISOVUE-370) solution 90 mL         90 mL Intravenous ONCE 03/16/24 0907            Interpretation of levels and current regimen:  Vancomycin level is reflective of AUC less than 400    Has serum creatinine changed greater than 50% in last 72 hours: No    Urine output:  good urine output    Renal Function: Stable    InsightRX Prediction of Planned New Vancomycin Regimen  Loading dose: N/A  Regimen: 1500 mg IV every 12 hours.  Start time: 18:36 on 03/17/2024  Exposure target: AUC24 (range)400-600 mg/L.hr   AUC24,ss: 547 mg/L.hr  Probability of AUC24 > 400: 97 %  Ctrough,ss: 13.1 mg/L  Probability of Ctrough,ss > 20: 0 %  Probability of nephrotoxicity (Lodise ARMANDO 2009): 8 %    Plan:  Increase Dose to 1500 mg IV q12h  Vancomycin monitoring method: AUC  Vancomycin therapeutic monitoring goal: 400-600 mg*h/L  Pharmacy will check vancomycin levels as appropriate in 1-3 Days.  Serum creatinine levels will be ordered daily for the first week of therapy and at least twice weekly for subsequent weeks.    JAYLA FISHER [facility]

## 2024-03-17 NOTE — PROGRESS NOTES
"Occupational Therapy     03/17/24 1430   Appointment Info   Signing Clinician's Name / Credentials (OT) Roslyn Aaron, JIMENA   Living Environment   People in Home alone   Current Living Arrangements house;other (see comments)  (one level [facility])   Home Accessibility no concerns   Living Environment Comments Walk-in shower with shower chair, grab bars, and hand held shower head; commode with grab bars   Self-Care   Usual Activity Tolerance fair   Current Activity Tolerance fair   Equipment Currently Used at Home walker, rolling;wheelchair, manual   Fall history within last six months no   Activity/Exercise/Self-Care Comment Ind ADLs; on Home O2 2L at night, 1 L during the day   Instrumental Activities of Daily Living (IADL)   IADL Comments Pt independent with light housework and meal prep; pt does have a car but hasnt driven herself in a while; she has family that assist with transportation as needed. Meal delivery service- 6 meals/week; groceries delivered. Family assists with heavier cleaning duties   General Information   Onset of Illness/Injury or Date of Surgery 03/16/24   Referring Physician Bienvenido Morris MBBS   Patient/Family Therapy Goal Statement (OT) to go home and to regain as much independence as possible   Additional Occupational Profile Info/Pertinent History of Current Problem Per chart: \"48 year old female with PMH significant for morbid obesity, lymphedema, NARCISA on CPAP, OHS, depression, HFpEF, PE (not on AC) who presented to our ED for evaluation of leg pain, fever and chills.\"   Existing Precautions/Restrictions oxygen therapy device and L/min  (6 L/min)   Cognitive Status Examination   Orientation Status orientation to person, place and time   Affect/Mental Status (Cognitive) WFL   Posture   Posture not impaired   Range of Motion Comprehensive   General Range of Motion no range of motion deficits identified  (BUE)   Strength Comprehensive (MMT)   General Manual Muscle Testing (MMT) Assessment no " strength deficits identified  (BUE WFL)   Bed Mobility   Bed Mobility supine-sit   Supine-Sit [place] (Bed Mobility) supervision   Comment (Bed Mobility) HOB elevated; pt does not sleep in a bed at home- she sleeps in a recliner chair   Transfers   Transfers sit-stand transfer   Sit-Stand Transfer   Sit-Stand [place] (Transfers) minimum assist (75% patient effort)   Assistive Device (Sit-Stand Transfers) walker, front-wheeled   Activities of Daily Living   BADL Assessment/Intervention lower body dressing   Lower Body Dressing Assessment/Training   Position (Lower Body Dressing) unsupported sitting  (EOB)   Comment, (Lower Body Dressing) needed extra assist d/t leg pain/weakness   [place] Level (Lower Body Dressing) maximum assist (25% patient effort)   Clinical Impression   Criteria for Skilled Therapeutic Interventions Met (OT) Yes, treatment indicated   OT Diagnosis Decr ADLs   OT Problem List-Impairments impacting ADL problems related to;activity tolerance impaired;mobility;strength;pain;flexibility;fear & anxiety   Assessment of Occupational Performance 3-5 Performance Deficits   Identified Performance Deficits transfers, dressing, meal prep, housekeeping, toileting   Planned Therapy Interventions (OT) ADL retraining;transfer training;strengthening;home program guidelines;progressive activity/exercise   Clinical Decision Making Complexity (OT) detailed assessment/moderate complexity   Risk & Benefits of therapy have been explained evaluation/treatment results reviewed;participants included;patient   OT Total Evaluation Time   OT Eval, Moderate Complexity Minutes (41647) 10   OT Goals   Therapy Frequency (OT) 5 times/week   OT Predicted Duration/Target Date for Goal Attainment 03/24/24   OT Goals Toilet Transfer/Toileting;Lower Body Dressing;Hygiene/Grooming   OT: Hygiene/Grooming modified independent   OT: Lower Body Dressing Modified independent   OT: Toilet Transfer/Toileting Modified  independent   Interventions   Interventions Quick Adds Self-Care/Home Management   Self-Care/Home Management   Self-Care/Home Mgmt/ADL, Compensatory, Meal Prep Minutes (43094) 15   Symptoms Noted During/After Treatment (Meal Preparation/Planning Training) none   Treatment Detail/Skilled Intervention Pt anxious about getting OOB, but agreeable with encouragement/education. Completed bed mob supine>sit with HOB elevated and use of bedrail, SBA; extended time and difficulty moving LLE to EOB. OT educ on technique and AE for bed mob- pt prefers to sleep in her recliner at home. Max A to don socks d/t BLE pain. Sit<>stand transfer at EOB, CGA with FWW and verbal cues for safe transfer technqiue. Took steps from EOB to chair with FWW and CGA. Pt on 6L O2 throughout session and maintained sats above 90. Pt seated up in recliner at end of session, all needs within reach and RN present.   OT Discharge Planning   OT Plan 3/24- activity tolerance, LB dressing with AE, toileting, FWW safety with transfers, UB exercise   OT Discharge Recommendation (DC Rec) home with assist;home with home care occupational therapy   OT Rationale for DC Rec Pending progress, anticipate d/c home with prior level of support with IADLs and resumption of Home OT. Pt has decreased activity tolerance but completed short distance fxl mob with FWW and CGA- maintains O2 above 90 on 6L. Pt has a good home setup.   OT Brief overview of current status SBA/CGA bed mob and transfer from bed to chair   Total Session Time   Timed Code Treatment Minutes 15   Total Session Time (sum of timed and untimed services) 25     Roslyn Aaron OTR/L 3/17/2024

## 2024-03-17 NOTE — PROVIDER NOTIFICATION
03/17/24 1300   Tech Time   $Tech Time (10 minute increments) 2   Vital Signs   Pulse 92   Oximeter Heart Rate 92 bpm   Oxygen Therapy   Daily Review of Necessity (O2 Therapy) completed   Flow (L/min) (Oxygen Therapy) (S)  6   Device (Oxygen Therapy) (S)  nasal cannula  (green high flow device)   Oxygen Therapy   SpO2 94 %   O2 Device (S)  Nasal cannula with humidification  (green high flow device)   Oxygen Delivery (S)  6 LPM   Assessment   Rhythm/Pattern, Respiratory unlabored;pattern regular;depth regular;no shortness of breath reported   Expansion/Accessory Muscles/Retractions no use of accessory muscles;no retractions;expansion symmetric     Pt family to bring here NPPV device. Facility device on standby.

## 2024-03-17 NOTE — PLAN OF CARE
Goal Outcome Evaluation:       No acute events overnight. Vitals stable. Antibiotics given per order. Pt denied any pain. Pt does get cyanotic when lying flat, but quickly recovers once HOB is elevated; this is patient's baseline. Pt's B hands and R foot are also cyanotic at times which is pt's baseline.     Pt has smear of stool with no blood noted.

## 2024-03-17 NOTE — PROGRESS NOTES
"Physical Therapy        03/17/24 1510   Appointment Info   Signing Clinician's Name / Credentials (PT) Liv Carey, DPRAMOS   Living Environment   People in Home alone   Current Living Arrangements   ([facility])   Home Accessibility no concerns   Living Environment Comments little ramp to enter 1 step of TH   Self-Care   Equipment Currently Used at Home walker, rolling;wheelchair, manual   Activity/Exercise/Self-Care Comment siblings or parents can drive if she doesn't feel up to driving, only goes to appointments. \"My highest anxiety is the bathroom\" due to UI   General Information   Onset of Illness/Injury or Date of Surgery 03/16/24   Referring Physician Bienvenido Morris, YOLI   Patient/Family Therapy Goals Statement (PT) none stated   Pertinent History of Current Problem (include personal factors and/or comorbidities that impact the POC) 48 year old female with PMH significant for MS, eating disorder, lymphedema, NARCISA on CPAP, OHS, depression, HFpEF, PE (not on AC) who presented to our ED for evaluation of leg pain, fever and chills.   Existing Precautions/Restrictions fall   Cognition   Affect/Mental Status (Cognition) WNL   Pain Assessment   Patient Currently in Pain No   Integumentary/Edema   Integumentary/Edema Comments significant edema and \"bumps\" throughout LEs L>R. pt states she has compression garments at home and can don them herself.   Posture    Posture Not impaired   Range of Motion (ROM)   Range of Motion ROM deficits secondary to swelling;ROM deficits secondary to weakness   Strength (Manual Muscle Testing)   Strength (Manual Muscle Testing) Deficits observed during functional mobility   Bed Mobility   Comment, (Bed Mobility) pt sleeps in recliner at home   Transfers   Comment, (Transfers) CGA sit to stand   Gait/Stairs (Locomotion)   Comment, (Gait/Stairs) 1 step forward/back with FWW(nasir), CGA   Balance   Balance no deficits were identified   Sensory Examination   Sensory Perception patient " reports no sensory changes   Coordination   Coordination no deficits were identified   Muscle Tone   Muscle Tone no deficits were identified   Clinical Impression   Criteria for Skilled Therapeutic Intervention Yes, treatment indicated   PT Diagnosis (PT) difficulty walking   Influenced by the following impairments decreased activity tolerance, strength   Functional limitations due to impairments limited household mobility   Clinical Presentation (PT Evaluation Complexity) stable   Clinical Presentation Rationale presents as medically diagnosed   Clinical Decision Making (Complexity) moderate complexity   Planned Therapy Interventions (PT) balance training;bed mobility training;gait training;home exercise program;ROM (range of motion);strengthening;transfer training;patient/family education   PT Total Evaluation Time   PT Eval, Moderate Complexity Minutes (84491) 10   Physical Therapy Goals   PT Frequency Daily   PT Predicted Duration/Target Date for Goal Attainment 03/26/24   PT Goals Transfers;Gait;PT Goal 1   PT: Transfers Modified independent;Sit to/from stand   PT: Gait Modified independent;10 feet;Rolling walker   PT: Goal 1 SBA; seated LE TE; 10 reps through full AROM   Interventions   Interventions Quick Adds Therapeutic Activity   Therapeutic Activity   Therapeutic Activities: dynamic activities to improve functional performance Minutes (35609) 24   Symptoms Noted During/After Treatment Fatigue   Treatment Detail/Skilled Intervention increased time for all activity due to long rest breaks and therapeutic presence. transfer training with nasir FWW - sit<>stand with minimal cues for hand placement, weight shifts. pt able to control descent with BUE for stand-sit. Pt amb forward/back with nasir FWW 4' x3. CGA with cues for PLB. LLE externally rotated 2/2 MS (used to have AFO but can no longer wear)   PT Discharge Planning   PT Plan transfer training, GT as claude with w/c follow   PT Discharge Recommendation (DC  Rec) home with assist;home with home care physical therapy;Per plan established by the PT   PT Rationale for DC Rec near baseline, pt has good system and support at home.   PT Brief overview of current status SBA sit<>stand and amb short distances with FWW   Total Session Time   Timed Code Treatment Minutes 24   Total Session Time (sum of timed and untimed services) 34         Liv Carey, DPT 3/17/2024

## 2024-03-17 NOTE — PROGRESS NOTES
"[place] [facility] Progress Note    SUBJECTIVE:  looks and feels better.  I couldn't examine as she is working with therapist.       REVIEW OF SYSTEMS:  Negative unless as listed above.  Social history, Family history, Medications: reviewed.    OBJECTIVE:  /76 (BP Location: Left arm)   Pulse 92   Temp 98.4  F (36.9  C) (Oral)   Resp 22   Ht 1.6 m (5' 3\")   Wt 149.6 kg (329 lb 12.9 oz)   SpO2 94%   BMI 58.42 kg/m                PHYSICAL EXAM:  Legs no change    Antibiotics:V/merrem    Pertinent labs:    Recent Labs   Lab Test 03/17/24  0534 03/16/24  0609 12/27/23  0444 12/24/23  0517 12/21/23  1324   WBC 4.7 12.7*  --   --  5.8   HGB 13.9 15.4  --   --  14.4   HCT 45.3 48.7*  --   --  46.6   MCV 88 85  --   --  85    189 238   < > 188    < > = values in this interval not displayed.       Lab Results   Component Value Date    RBC 5.16 03/17/2024    RBC 4.91 03/07/2021     Lab Results   Component Value Date    HGB 13.9 03/17/2024    HGB 12.5 03/07/2021     Lab Results   Component Value Date    HCT 45.3 03/17/2024    HCT 42.8 03/07/2021     No components found for: \"MCT\"  Lab Results   Component Value Date    MCV 88 03/17/2024    MCV 87 03/07/2021     Lab Results   Component Value Date    MCH 26.9 03/17/2024    MCH 25.5 03/07/2021     Lab Results   Component Value Date    MCHC 30.7 03/17/2024    MCHC 29.2 03/07/2021     Lab Results   Component Value Date    RDW 19.2 03/17/2024    RDW 15.6 03/07/2021     Lab Results   Component Value Date     03/17/2024     03/08/2021       Last Comprehensive Metabolic Panel:  Sodium   Date Value Ref Range Status   03/17/2024 138 135 - 145 mmol/L Final     Comment:     Reference intervals for this test were updated on 09/26/2023 to more accurately reflect our healthy population. There may be differences in the flagging of prior results with similar values performed with this method. Interpretation of those prior results can be made in the " context of the updated reference intervals.    03/07/2021 139 133 - 144 mmol/L Final     Potassium   Date Value Ref Range Status   03/17/2024 3.9 3.4 - 5.3 mmol/L Final   09/06/2022 3.6 3.5 - 5.0 mmol/L Final   03/07/2021 4.3 3.4 - 5.3 mmol/L Final     Chloride   Date Value Ref Range Status   03/17/2024 105 98 - 107 mmol/L Final   09/06/2022 98 98 - 107 mmol/L Final   03/07/2021 105 94 - 109 mmol/L Final     Carbon Dioxide   Date Value Ref Range Status   03/07/2021 30 20 - 32 mmol/L Final     Carbon Dioxide (CO2)   Date Value Ref Range Status   03/17/2024 26 22 - 29 mmol/L Final   09/06/2022 25 22 - 31 mmol/L Final     Anion Gap   Date Value Ref Range Status   03/17/2024 7 7 - 15 mmol/L Final   09/06/2022 11 5 - 18 mmol/L Final   03/07/2021 4 3 - 14 mmol/L Final     Glucose   Date Value Ref Range Status   03/17/2024 94 70 - 99 mg/dL Final   09/06/2022 94 70 - 125 mg/dL Final   03/07/2021 99 70 - 99 mg/dL Final     GLUCOSE BY METER POCT   Date Value Ref Range Status   12/22/2023 117 (H) 70 - 99 mg/dL Final     Urea Nitrogen   Date Value Ref Range Status   03/17/2024 14.1 6.0 - 20.0 mg/dL Final   09/06/2022 22 8 - 22 mg/dL Final   03/07/2021 12 7 - 30 mg/dL Final     Creatinine   Date Value Ref Range Status   03/17/2024 0.77 0.51 - 0.95 mg/dL Final   03/07/2021 0.62 0.52 - 1.04 mg/dL Final     GFR Estimate   Date Value Ref Range Status   03/17/2024 >90 >60 mL/min/1.73m2 Final   03/07/2021 >90 >60 mL/min/[1.73_m2] Final     Comment:     Non  GFR Calc  Starting 12/18/2018, serum creatinine based estimated GFR (eGFR) will be   calculated using the Chronic Kidney Disease Epidemiology Collaboration   (CKD-EPI) equation.       Calcium   Date Value Ref Range Status   03/17/2024 9.1 8.6 - 10.0 mg/dL Final   03/07/2021 9.4 8.5 - 10.1 mg/dL Final       Liver Function Studies -   Recent Labs   Lab Test 03/16/24  0609   PROTTOTAL 8.7*   ALBUMIN 3.8   BILITOTAL 2.1*   ALKPHOS 71   AST 20   ALT 9       No results  "found for: \"SED\"    CRP Inflammation   Date Value Ref Range Status   03/06/2021 144.0 (H) 0.0 - 8.0 mg/L Final     CRP   Date Value Ref Range Status   02/12/2022 8.5 (H) 0.0-<0.8 mg/dL Final               MICROBIOLOGY DATA:        IMAGING/RADIOLOGY:          ASSESSMENT:  Excess calorie state  Stasis vs cellulitis BLE  Staph lug in 2 bottles of 4 bc done.  Repeat pending    RECOMMENDATION:  On maximal abx  Legs try to manage, unlikely to heal much  See what today's BC look like  NICOLE Villarreal MD  Office [phone] option 2 to desk staff  "

## 2024-03-17 NOTE — PLAN OF CARE
"  Problem: Adult Inpatient Plan of Care  Goal: Plan of Care Review  Description: The Plan of Care Review/Shift note should be completed every shift.  The Outcome Evaluation is a brief statement about your assessment that the patient is improving, declining, or no change.  This information will be displayed automatically on your shift  note.  Outcome: Progressing  Goal: Patient-Specific Goal (Individualized)  Description: You can add care plan individualizations to a care plan. Examples of Individualization might be:  \"Parent requests to be called daily at 9am for status\", \"I have a hard time hearing out of my right ear\", or \"Do not touch me to wake me up as it startles  me\".  Outcome: Progressing     Problem: Infection  Goal: Absence of Infection Signs and Symptoms  Outcome: Progressing     Problem: Gas Exchange Impaired  Goal: Optimal Gas Exchange  Outcome: Progressing  Intervention: Optimize Oxygenation and Ventilation  Recent Flowsheet Documentation  Taken 3/16/2024 1718 by Savannah Garces, KINDRA  Head of Bed (HOB) Positioning: HOB at 20-30 degrees  Taken 3/16/2024 1551 by Savannah Garces, RN  Head of Bed (HOB) Positioning: HOB at 20-30 degrees  Taken 3/16/2024 1548 by Savannah Garces, RN  Head of Bed (HOB) Positioning: HOB at 20-30 degrees   Goal Outcome Evaluation:  Bess arrived from ED this shift. States she is very tired. Has been wearing cpap for most of the shift. Taking it off her self. On 5L oxymask, or 5L bleed on the Cpap. Using a purewick. No complaints of pain. Says she sleeps in her recliner at home so is sleeping reclined. Tele is NSR.                      "

## 2024-03-17 NOTE — PLAN OF CARE
Output greater than intake.  Wicking cloth placed behind knees and in lower abdominal fold. SCD's not applied as patient states the bubbled areas of skin are likely to pop with use. Denies pain at rest. No fevers. IV antibiotics given. RT following- goal is to maintain sats >90%.  Eating and drinking well. Did not get to chair this shift. Patricia Nuñez RN    Problem: Gas Exchange Impaired  Goal: Optimal Gas Exchange  Outcome: Progressing     Problem: Skin or Soft Tissue Infection  Goal: Absence of Infection Signs and Symptoms  Outcome: Progressing     Problem: Heart Failure  Goal: Effective Oxygenation and Ventilation  Outcome: Progressing     Problem: Heart Failure  Goal: Effective Breathing Pattern During Sleep  Outcome: Progressing

## 2024-03-17 NOTE — PROGRESS NOTES
[facility]    Medicine Progress Note - Hospitalist Service    Date of Admission:  3/16/2024    Assessment & Plan   Patient is a 48 year old female with PMH significant for morbid obesity, lymphedema, NARCISA on CPAP, OHS, depression, HFpEF, PE (not on AC) who presented to our ED for evaluation of leg pain, fever and chills.      Sepsis  Thigh cellulitis  -Patient presented with fever/chills, sinus tachycarida, thigh pain and leucocytosis in the context of chronic lymphedema  -Blood culture from 3/16 growing GPC in clusters. Staph Lugdunensis by verigene. Ordered another set of B/C today. UA- negative.  -Cont IV vanc/meropenem. ID consult appreciated     Leg swelling  Cough/shortness of breath  -Likely combination of cellulitis, CHF again in the context of chronic lymphedema. H/O PE noted per chart review. No PE now per CTA chest. Venous duplex- no DVT  -BNP 3475. No prior BNP for comparison. Echo as of 12/2023 showed EF of 50-55%. Repeat echo- pending  -Chest xray shows mild venous congestion. But no infiltrate. Received 2mg of IV bumex in ED. Cont home bumex from today     Mild trop elevation  -Trop of 37 noted. EKG showed accelerated junction rhythm with prolong Qtc  -Trend trop. Check echo- pending     Prolong QTc  -Qtc of 638 noted on EKG. Potassium is normal. Check Mg  -Avoid QT prolong meds. Hold sertraline (low risk for QT prolongation)     Rectal bleeding, intermittent  -Bright red with stools per patient. Suspect from hemorrhoids/outlet bleeding  -Hgb is stable. Will consult GI if continues to bleed here     NARCISA  OHS  -Cont CPAP at night. Supplemental O2      H/O PE  -Few years ago. Was on AC only for few months per patient. Stopped taking AC per her MD's recommendation per patient          Diet: Combination Diet Low Saturated Fat Na <2400mg Diet, No Caffeine Diet    DVT Prophylaxis: Pneumatic Compression Devices  Brar Catheter: Not present  Lines: None     Cardiac Monitoring: ACTIVE  "order. Indication: QTc prolonging medication (48 hours)  Code Status: Full Code      Clinically Significant Risk Factors                  # Hypertension: Noted on problem list  # Acute heart failure with preserved ejection fraction: heart failure noted on problem list, last echo with EF >50%, and receiving IV diuretics       # Severe Obesity: Estimated body mass index is 58.42 kg/m  as calculated from the following:    Height as of this encounter: 1.6 m (5' 3\").    Weight as of this encounter: 149.6 kg (329 lb 12.9 oz)., PRESENT ON ADMISSION     # Financial/Environmental Concerns:           Disposition Plan     Expected Discharge Date: 03/18/2024      Destination: home with help/services              YOLI Connor  Hospitalist Service  [facility]  Securely message with [employer] (more info)  Text page via [employer] Paging/Directory   ______________________________________________________________________    Interval History   Patient seen and examined this morning. Still on 4L of O2. States she feels a lot better today compared to yesterday.     Physical Exam   Vital Signs: Temp: 98.4  F (36.9  C) Temp src: Oral BP: 123/76 Pulse: 93   Resp: 22 SpO2: 90 % O2 Device: Oxymask Oxygen Delivery: 6 LPM  Weight: 329 lbs 12.93 oz      General: Not in obvious distress.  HEENT: NC, AT   Chest: Clear to auscultation bilaterally  Heart: S1S2 normal, regular. No M/R/G  Abdomen: Soft. NT, ND. Bowel sounds- active.  Extremities: Chronic LE lymphedema with induration and verrucous changes. . Redness and induration of the tissue over the inner thigh, left worse than the right. The redness over the feet and calf area aren't new per patient.    Neuro: alert and awake, grossly non-focal      Medical Decision Making             Data     "

## 2024-03-18 ENCOUNTER — APPOINTMENT (OUTPATIENT)
Dept: OCCUPATIONAL THERAPY | Facility: HOSPITAL | Age: 49
DRG: 871 | End: 2024-03-18
Payer: MEDICARE

## 2024-03-18 LAB
CREAT SERPL-MCNC: 0.73 MG/DL (ref 0.51–0.95)
EGFRCR SERPLBLD CKD-EPI 2021: >90 ML/MIN/1.73M2

## 2024-03-18 PROCEDURE — 99233 SBSQ HOSP IP/OBS HIGH 50: CPT | Performed by: INTERNAL MEDICINE

## 2024-03-18 PROCEDURE — 97535 SELF CARE MNGMENT TRAINING: CPT | Mod: GO

## 2024-03-18 PROCEDURE — 258N000003 HC RX IP 258 OP 636: Performed by: INTERNAL MEDICINE

## 2024-03-18 PROCEDURE — 250N000011 HC RX IP 250 OP 636: Performed by: INTERNAL MEDICINE

## 2024-03-18 PROCEDURE — 999N000157 HC STATISTIC RCP TIME EA 10 MIN

## 2024-03-18 PROCEDURE — 94660 CPAP INITIATION&MGMT: CPT

## 2024-03-18 PROCEDURE — 36415 COLL VENOUS BLD VENIPUNCTURE: CPT | Performed by: INTERNAL MEDICINE

## 2024-03-18 PROCEDURE — 82565 ASSAY OF CREATININE: CPT | Performed by: INTERNAL MEDICINE

## 2024-03-18 PROCEDURE — 250N000013 HC RX MED GY IP 250 OP 250 PS 637: Performed by: INTERNAL MEDICINE

## 2024-03-18 PROCEDURE — 97530 THERAPEUTIC ACTIVITIES: CPT | Mod: GO

## 2024-03-18 PROCEDURE — 99232 SBSQ HOSP IP/OBS MODERATE 35: CPT | Performed by: INTERNAL MEDICINE

## 2024-03-18 PROCEDURE — 210N000001 HC R&B IMCU HEART CARE

## 2024-03-18 RX ADMIN — VANCOMYCIN HYDROCHLORIDE 1500 MG: 5 INJECTION, POWDER, LYOPHILIZED, FOR SOLUTION INTRAVENOUS at 18:04

## 2024-03-18 RX ADMIN — MEROPENEM 1 G: 1 INJECTION, POWDER, FOR SOLUTION INTRAVENOUS at 13:14

## 2024-03-18 RX ADMIN — MEROPENEM 1 G: 1 INJECTION, POWDER, FOR SOLUTION INTRAVENOUS at 05:47

## 2024-03-18 RX ADMIN — ASPIRIN 81 MG: 81 TABLET, COATED ORAL at 08:36

## 2024-03-18 RX ADMIN — VANCOMYCIN HYDROCHLORIDE 1500 MG: 5 INJECTION, POWDER, LYOPHILIZED, FOR SOLUTION INTRAVENOUS at 06:53

## 2024-03-18 ASSESSMENT — ACTIVITIES OF DAILY LIVING (ADL)
ADLS_ACUITY_SCORE: 49
ADLS_ACUITY_SCORE: 50
ADLS_ACUITY_SCORE: 50
ADLS_ACUITY_SCORE: 49
ADLS_ACUITY_SCORE: 50
ADLS_ACUITY_SCORE: 51
ADLS_ACUITY_SCORE: 49
ADLS_ACUITY_SCORE: 50
ADLS_ACUITY_SCORE: 49
ADLS_ACUITY_SCORE: 50
ADLS_ACUITY_SCORE: 50
ADLS_ACUITY_SCORE: 49
ADLS_ACUITY_SCORE: 50
ADLS_ACUITY_SCORE: 49
ADLS_ACUITY_SCORE: 50
ADLS_ACUITY_SCORE: 49
ADLS_ACUITY_SCORE: 50
ADLS_ACUITY_SCORE: 50
ADLS_ACUITY_SCORE: 49

## 2024-03-18 NOTE — PLAN OF CARE
"Goal Outcome Evaluation:      Plan of Care Reviewed With: patient    Overall Patient Progress: improving  Problem: Adult Inpatient Plan of Care  Goal: Plan of Care Review  Description: The Plan of Care Review/Shift note should be completed every shift.  The Outcome Evaluation is a brief statement about your assessment that the patient is improving, declining, or no change.  This information will be displayed automatically on your shift  note.  Outcome: Progressing  Flowsheets (Taken 3/17/2024 2230)  Plan of Care Reviewed With: patient  Overall Patient Progress: improving  Goal: Patient-Specific Goal (Individualized)  Description: You can add care plan individualizations to a care plan. Examples of Individualization might be:  \"Parent requests to be called daily at 9am for status\", \"I have a hard time hearing out of my right ear\", or \"Do not touch me to wake me up as it startles  me\".  Outcome: Progressing     Problem: Gas Exchange Impaired  Goal: Optimal Gas Exchange  Outcome: Progressing  Intervention: Optimize Oxygenation and Ventilation  Recent Flowsheet Documentation  Taken 3/17/2024 1607 by Savannah Garces, KINDRA  Head of Bed (HOB) Positioning: (self regulated) HOB at 30-45 degrees     Problem: Skin or Soft Tissue Infection  Goal: Absence of Infection Signs and Symptoms  Outcome: Progressing  Intervention: Minimize and Manage Infection Progression  Recent Flowsheet Documentation  Taken 3/17/2024 1920 by Savannah Garces, RN  Infection Prevention: hand hygiene promoted  Taken 3/17/2024 1607 by Savannah Garces, RN  Infection Prevention: hand hygiene promoted  Bess had a good shift. Spent some of the shift up in the chair. No complaints of pain, she says her legs feel better today. Tele is NSR. Did have a bloody nose this evening. Switched off nasal cannula to Cpap mask at that time. No BM this shift and no rectal bleeding noted.           "

## 2024-03-18 NOTE — PROGRESS NOTES
Patient's home CPAP set up in room with sterile water and is ready for use. Patient bleeds in 2L O2 at home.     RT will follow.     Yvonne Vernon, RT

## 2024-03-18 NOTE — PROGRESS NOTES
"[place] [facility] Progress Note    SUBJECTIVE:  l    Wondering about going home  Usually on 1 L oxygen at home    REVIEW OF SYSTEMS:  Negative unless as listed above.  Social history, Family history, Medications: reviewed.    OBJECTIVE:  BP (!) 153/84 (BP Location: Left arm)   Pulse 73   Temp 98.5  F (36.9  C) (Oral)   Resp 18   Ht 1.6 m (5' 3\")   Wt (!) 150.3 kg (331 lb 5.6 oz)   SpO2 97%   BMI 58.70 kg/m                PHYSICAL EXAM:  Gen. appearance nontoxic  Eyes no conjunctivitis or icterus  Neck no stiffness or neck vein distention, no LN  Heart  No edema  Lungs breathing comfortably  Abdomen soft not tender  Extremities reviewed lymphedema no redness no tenderness (was tender before per patient0  Skin  no rash or emboli  Neurologic alert oriented no focal deficits      Antibiotics:V/merrem    Pertinent labs:    Recent Labs   Lab Test 03/17/24  0534 03/16/24  0609 12/27/23  0444 12/24/23  0517 12/21/23  1324   WBC 4.7 12.7*  --   --  5.8   HGB 13.9 15.4  --   --  14.4   HCT 45.3 48.7*  --   --  46.6   MCV 88 85  --   --  85    189 238   < > 188    < > = values in this interval not displayed.       Lab Results   Component Value Date    RBC 5.16 03/17/2024    RBC 4.91 03/07/2021     Lab Results   Component Value Date    HGB 13.9 03/17/2024    HGB 12.5 03/07/2021     Lab Results   Component Value Date    HCT 45.3 03/17/2024    HCT 42.8 03/07/2021     No components found for: \"MCT\"  Lab Results   Component Value Date    MCV 88 03/17/2024    MCV 87 03/07/2021     Lab Results   Component Value Date    MCH 26.9 03/17/2024    MCH 25.5 03/07/2021     Lab Results   Component Value Date    MCHC 30.7 03/17/2024    MCHC 29.2 03/07/2021     Lab Results   Component Value Date    RDW 19.2 03/17/2024    RDW 15.6 03/07/2021     Lab Results   Component Value Date     03/17/2024     03/08/2021       Last Comprehensive Metabolic Panel:  Sodium   Date Value Ref Range Status   03/17/2024 138 135 " - 145 mmol/L Final     Comment:     Reference intervals for this test were updated on 09/26/2023 to more accurately reflect our healthy population. There may be differences in the flagging of prior results with similar values performed with this method. Interpretation of those prior results can be made in the context of the updated reference intervals.    03/07/2021 139 133 - 144 mmol/L Final     Potassium   Date Value Ref Range Status   03/17/2024 3.9 3.4 - 5.3 mmol/L Final   09/06/2022 3.6 3.5 - 5.0 mmol/L Final   03/07/2021 4.3 3.4 - 5.3 mmol/L Final     Chloride   Date Value Ref Range Status   03/17/2024 105 98 - 107 mmol/L Final   09/06/2022 98 98 - 107 mmol/L Final   03/07/2021 105 94 - 109 mmol/L Final     Carbon Dioxide   Date Value Ref Range Status   03/07/2021 30 20 - 32 mmol/L Final     Carbon Dioxide (CO2)   Date Value Ref Range Status   03/17/2024 26 22 - 29 mmol/L Final   09/06/2022 25 22 - 31 mmol/L Final     Anion Gap   Date Value Ref Range Status   03/17/2024 7 7 - 15 mmol/L Final   09/06/2022 11 5 - 18 mmol/L Final   03/07/2021 4 3 - 14 mmol/L Final     Glucose   Date Value Ref Range Status   03/17/2024 94 70 - 99 mg/dL Final   09/06/2022 94 70 - 125 mg/dL Final   03/07/2021 99 70 - 99 mg/dL Final     GLUCOSE BY METER POCT   Date Value Ref Range Status   12/22/2023 117 (H) 70 - 99 mg/dL Final     Urea Nitrogen   Date Value Ref Range Status   03/17/2024 14.1 6.0 - 20.0 mg/dL Final   09/06/2022 22 8 - 22 mg/dL Final   03/07/2021 12 7 - 30 mg/dL Final     Creatinine   Date Value Ref Range Status   03/18/2024 0.73 0.51 - 0.95 mg/dL Final   03/07/2021 0.62 0.52 - 1.04 mg/dL Final     GFR Estimate   Date Value Ref Range Status   03/18/2024 >90 >60 mL/min/1.73m2 Final   03/07/2021 >90 >60 mL/min/[1.73_m2] Final     Comment:     Non  GFR Calc  Starting 12/18/2018, serum creatinine based estimated GFR (eGFR) will be   calculated using the Chronic Kidney Disease Epidemiology Collaboration  "  (CKD-EPI) equation.       Calcium   Date Value Ref Range Status   03/17/2024 9.1 8.6 - 10.0 mg/dL Final   03/07/2021 9.4 8.5 - 10.1 mg/dL Final       Liver Function Studies -   Recent Labs   Lab Test 03/16/24  0609   PROTTOTAL 8.7*   ALBUMIN 3.8   BILITOTAL 2.1*   ALKPHOS 71   AST 20   ALT 9       No results found for: \"SED\"    CRP Inflammation   Date Value Ref Range Status   03/06/2021 144.0 (H) 0.0 - 8.0 mg/L Final     CRP   Date Value Ref Range Status   02/12/2022 8.5 (H) 0.0-<0.8 mg/dL Final               MICROBIOLOGY DATA:        IMAGING/RADIOLOGY:          ASSESSMENT:    Staph lugdunensis bacteremia  Suspected left leg cellulitis on admission (not obvious on my exam today)  Shortness of breath, respiratory failure, on CT chest no pathology      RECOMMENDATION:  DC meropenem  Continue IV vancomycin pending susceptibilities  Bc REPEATED 3/17: Need dose to be negative tomorrow before discharge    Alyssa Call M.D.    Office [phone] option 2 to desk staff  "

## 2024-03-18 NOTE — PROGRESS NOTES
[facility]    Medicine Progress Note - Hospitalist Service    Date of Admission:  3/16/2024    Assessment & Plan   Patient is a 48 year old female with PMH significant for morbid obesity, lymphedema, NARCISA on CPAP, OHS, depression, HFpEF, PE (not on AC) who presented to our ED for evaluation of leg pain, fever and chills.      Sepsis  Thigh cellulitis  -Patient presented with fever/chills, sinus tachycarida, thigh pain and leucocytosis in the context of chronic lymphedema  -Blood culture from 3/16 grew Staph Lugdunensis. B/C from 4/17- in process. UA- negative.  -Treated with IV vanc/meropenem. ID consult appreciated. Meropenem d/skye today. Cont with IV vancomycin. ID considering switching to PO tomorrow if b/c from 4/17 remains negative     Leg swelling  Cough/shortness of breath  -Likely combination of cellulitis, CHF again in the context of chronic lymphedema. H/O PE noted per chart review. No PE now per CTA chest. Venous duplex- no DVT  -BNP 3475. No prior BNP for comparison. Echo as of 12/2023 showed EF of 50-55%. Repeat echo here showed EF >65%  -Chest xray shows mild venous congestion. But no infiltrate. Received 2mg of IV bumex in ED. Cont home dose of PO bumex.     Mild trop elevation  -Trop of 37 noted. EKG showed accelerated junction rhythm with prolong Qtc  -Trend trop. Check echo- pending     Prolong QTc  -QTc of 638 noted on EKG. Potassium is normal. Normal Mg  -Avoid QT prolong meds. Hold sertraline (low risk for QT prolongation)     Rectal bleeding, intermittent  -Bright red with stools per patient. Suspect from hemorrhoids/outlet bleeding  -Hgb is stable. Check labs in am     NARCISA  OHS  -Cont CPAP at night. Supplemental O2      H/O PE  -Few years ago. Was on AC only for few months per patient. Stopped taking AC per her MD's recommendation per patient          Diet: Combination Diet Low Saturated Fat Na <2400mg Diet, No Caffeine Diet    DVT Prophylaxis: Pneumatic Compression  "Devices  Brar Catheter: Not present  Lines: None     Cardiac Monitoring: ACTIVE order. Indication: QTc prolonging medication (48 hours)  Code Status: Full Code      Clinically Significant Risk Factors                  # Hypertension: Noted on problem list  # Acute heart failure with preserved ejection fraction: heart failure noted on problem list, last echo with EF >50%, and receiving IV diuretics       # Severe Obesity: Estimated body mass index is 58.7 kg/m  as calculated from the following:    Height as of this encounter: 1.6 m (5' 3\").    Weight as of this encounter: 150.3 kg (331 lb 5.6 oz)., PRESENT ON ADMISSION       # Financial/Environmental Concerns:           Disposition Plan     Expected Discharge Date: 03/20/2024      Destination: home with help/services  Discharge Comments: iv abx            YOLI Connor  Hospitalist Service  [facility]  Securely message with [employer] (more info)  Text page via [employer] Paging/Directory   ______________________________________________________________________    Interval History   Patient seen and examined this morning. Patient reports feeling much better today. Worked with therapy. No fever or chills overnight.     Physical Exam   Vital Signs: Temp: 98.5  F (36.9  C) Temp src: Oral BP: (!) 153/84 Pulse: 73   Resp: 18 SpO2: 97 % O2 Device: Nasal cannula with humidification Oxygen Delivery: 4 LPM  Weight: 331 lbs 5.62 oz      General: Not in obvious distress.  HEENT: NC, AT   Chest: Clear to auscultation bilaterally  Heart: S1S2 normal, regular. No M/R/G  Abdomen: Soft. NT, ND. Bowel sounds- active.  Extremities: Chronic LE lymphedema with induration and verrucous changes. . Redness and induration of the tissue over the inner thigh, left worse than the right. The redness over the feet and calf area aren't new per patient.    Neuro: alert and awake, grossly non-focal      Medical Decision Making             Data     "

## 2024-03-18 NOTE — PROGRESS NOTES
Care Management Follow Up    Length of Stay (days): 2    Expected Discharge Date: 03/19/2024     Concerns to be Addressed: discharge planning     Patient plan of care discussed at interdisciplinary rounds: Yes    Anticipated Discharge Disposition: Home     Anticipated Discharge Services:  none  Anticipated Discharge DME:  walker    Education Provided on the Discharge Plan: Yes  Patient/Family in Agreement with the Plan:  Yes    Referrals Placed by CM/SW:  N/A  Private pay costs discussed: Not applicable    Additional Information:  Chart reviewed. Social work student met with pt at bedside to check in on discharge progress and confirm that pt was agreeable to home with assist and resumption of SN/HA/PT home care services. Pt confirmed that she and her family are agreeable to this plan.     Pt was unsure of discharge date, thought maybe today, asked for clarity. Social work student consulted chart with  and informed pt that we are waiting on ID lab result which will not come through until tomorrow. PT, OT and MD have signed off.     Pt understands that she will be discharging tomorrow (3/19) or later, is agreeable, contacted family and informed them.     Discharge disposition home with assist and resumption of home care services, family to transport at discharge, CM will follow.     Raf Weaver

## 2024-03-18 NOTE — PLAN OF CARE
Goal Outcome Evaluation:       No acute events overnight. Vitals stable; was on own CPAP with 7L of oxygen bleed in to maintain oxygen saturation >90%. Pt denied any pain.

## 2024-03-18 NOTE — PLAN OF CARE
Problem: Infection  Goal: Absence of Infection Signs and Symptoms  Outcome: Progressing     Problem: Gas Exchange Impaired  Goal: Optimal Gas Exchange  Outcome: Progressing     Problem: Skin or Soft Tissue Infection  Goal: Absence of Infection Signs and Symptoms  Outcome: Progressing    Problem: Heart Failure  Goal: Stable Heart Rate and Rhythm  Outcome: Progressing  Goal: Fluid and Electrolyte Balance  Outcome: Progressing  Goal: Effective Oxygenation and Ventilation  Outcome: Progressing  Intervention: Promote Airway Secretion Clearance  Recent Flowsheet Documentation  Taken 3/18/2024 1200 by Nessa Olmedo RN  Cough And Deep Breathing: done independently per patient  Activity Management: activity adjusted per tolerance  Taken 3/18/2024 0800 by Nessa Olmedo RN  Cough And Deep Breathing: done independently per patient  Activity Management: activity adjusted per tolerance  Goal: Effective Breathing Pattern During Sleep  Outcome: Progressing  Intervention: Monitor and Manage Obstructive Sleep Apnea  Recent Flowsheet Documentation  Taken 3/18/2024 1200 by Nessa Olmedo RN  Medication Review/Management: medications reviewed  Taken 3/18/2024 0800 by Nessa Olmedo RN  Medication Review/Management: medications reviewed      Goal Outcome Evaluation:       Pt is A/O x 4. Assist x1 with walker and gait belt. Purewick off. Pt is able to ambulate to commode. Has been on NC 4L during all shift. Tried to wean down to 3L, but pt desaturating bellow 90%. Pt uses CPAP overnight. VSS. Tele: NSR with prolonged T waves. BLEs edema present with small bumps. Antibiotic IV infused. The plan is to have PT evaluation before discharge.

## 2024-03-19 ENCOUNTER — APPOINTMENT (OUTPATIENT)
Dept: PHYSICAL THERAPY | Facility: HOSPITAL | Age: 49
DRG: 871 | End: 2024-03-19
Attending: INTERNAL MEDICINE
Payer: MEDICARE

## 2024-03-19 ENCOUNTER — APPOINTMENT (OUTPATIENT)
Dept: PHYSICAL THERAPY | Facility: HOSPITAL | Age: 49
DRG: 871 | End: 2024-03-19
Payer: MEDICARE

## 2024-03-19 LAB
BACTERIA BLD CULT: ABNORMAL
CREAT SERPL-MCNC: 0.63 MG/DL (ref 0.51–0.95)
EGFRCR SERPLBLD CKD-EPI 2021: >90 ML/MIN/1.73M2
ERYTHROCYTE [DISTWIDTH] IN BLOOD BY AUTOMATED COUNT: 18.7 % (ref 10–15)
HCT VFR BLD AUTO: 43.8 % (ref 35–47)
HGB BLD-MCNC: 13.6 G/DL (ref 11.7–15.7)
MCH RBC QN AUTO: 26.8 PG (ref 26.5–33)
MCHC RBC AUTO-ENTMCNC: 31.1 G/DL (ref 31.5–36.5)
MCV RBC AUTO: 86 FL (ref 78–100)
PLATELET # BLD AUTO: 175 10E3/UL (ref 150–450)
RBC # BLD AUTO: 5.07 10E6/UL (ref 3.8–5.2)
WBC # BLD AUTO: 4.2 10E3/UL (ref 4–11)

## 2024-03-19 PROCEDURE — 250N000011 HC RX IP 250 OP 636: Performed by: INTERNAL MEDICINE

## 2024-03-19 PROCEDURE — 250N000013 HC RX MED GY IP 250 OP 250 PS 637: Performed by: INTERNAL MEDICINE

## 2024-03-19 PROCEDURE — 85027 COMPLETE CBC AUTOMATED: CPT | Performed by: INTERNAL MEDICINE

## 2024-03-19 PROCEDURE — 999N000157 HC STATISTIC RCP TIME EA 10 MIN

## 2024-03-19 PROCEDURE — 258N000003 HC RX IP 258 OP 636: Performed by: INTERNAL MEDICINE

## 2024-03-19 PROCEDURE — 210N000001 HC R&B IMCU HEART CARE

## 2024-03-19 PROCEDURE — 36415 COLL VENOUS BLD VENIPUNCTURE: CPT | Performed by: INTERNAL MEDICINE

## 2024-03-19 PROCEDURE — 99233 SBSQ HOSP IP/OBS HIGH 50: CPT | Performed by: INTERNAL MEDICINE

## 2024-03-19 PROCEDURE — 82565 ASSAY OF CREATININE: CPT | Performed by: INTERNAL MEDICINE

## 2024-03-19 PROCEDURE — 99232 SBSQ HOSP IP/OBS MODERATE 35: CPT | Performed by: INTERNAL MEDICINE

## 2024-03-19 PROCEDURE — 97530 THERAPEUTIC ACTIVITIES: CPT | Mod: GP

## 2024-03-19 PROCEDURE — 97535 SELF CARE MNGMENT TRAINING: CPT | Mod: GP

## 2024-03-19 PROCEDURE — 87040 BLOOD CULTURE FOR BACTERIA: CPT | Performed by: INTERNAL MEDICINE

## 2024-03-19 RX ORDER — PROCHLORPERAZINE 25 MG
25 SUPPOSITORY, RECTAL RECTAL EVERY 12 HOURS PRN
Status: DISCONTINUED | OUTPATIENT
Start: 2024-03-19 | End: 2024-03-20 | Stop reason: HOSPADM

## 2024-03-19 RX ORDER — PROCHLORPERAZINE MALEATE 5 MG
5 TABLET ORAL EVERY 6 HOURS PRN
Status: DISCONTINUED | OUTPATIENT
Start: 2024-03-19 | End: 2024-03-20 | Stop reason: HOSPADM

## 2024-03-19 RX ORDER — CEFAZOLIN SODIUM 2 G/100ML
2 INJECTION, SOLUTION INTRAVENOUS EVERY 8 HOURS
Status: DISCONTINUED | OUTPATIENT
Start: 2024-03-19 | End: 2024-03-20

## 2024-03-19 RX ADMIN — CEFAZOLIN SODIUM 2 G: 2 INJECTION, SOLUTION INTRAVENOUS at 14:38

## 2024-03-19 RX ADMIN — BUMETANIDE 3 MG: 2 TABLET ORAL at 09:39

## 2024-03-19 RX ADMIN — VANCOMYCIN HYDROCHLORIDE 1500 MG: 5 INJECTION, POWDER, LYOPHILIZED, FOR SOLUTION INTRAVENOUS at 05:40

## 2024-03-19 RX ADMIN — ASPIRIN 81 MG: 81 TABLET, COATED ORAL at 08:34

## 2024-03-19 RX ADMIN — METOPROLOL SUCCINATE 25 MG: 25 TABLET, EXTENDED RELEASE ORAL at 08:42

## 2024-03-19 RX ADMIN — CEFAZOLIN SODIUM 2 G: 2 INJECTION, SOLUTION INTRAVENOUS at 21:20

## 2024-03-19 ASSESSMENT — ACTIVITIES OF DAILY LIVING (ADL)
ADLS_ACUITY_SCORE: 51
ADLS_ACUITY_SCORE: 51
ADLS_ACUITY_SCORE: 52
ADLS_ACUITY_SCORE: 52
ADLS_ACUITY_SCORE: 51
ADLS_ACUITY_SCORE: 52
ADLS_ACUITY_SCORE: 51
ADLS_ACUITY_SCORE: 52
ADLS_ACUITY_SCORE: 51
ADLS_ACUITY_SCORE: 52
ADLS_ACUITY_SCORE: 51
ADLS_ACUITY_SCORE: 52
ADLS_ACUITY_SCORE: 52
ADLS_ACUITY_SCORE: 51
ADLS_ACUITY_SCORE: 52

## 2024-03-19 NOTE — PLAN OF CARE
Problem: Infection  Goal: Absence of Infection Signs and Symptoms  Outcome: Progressing     Problem: Gas Exchange Impaired  Goal: Optimal Gas Exchange  Outcome: Progressing     Problem: Skin or Soft Tissue Infection  Goal: Absence of Infection Signs and Symptoms  Outcome: Progressing   Goal Outcome Evaluation:    Afebrile. Lower extremities- still reddened. Continue IV antibiotics.   On cpap - 6 liters- 92%  Denies any pain

## 2024-03-19 NOTE — PROGRESS NOTES
"[place] [facility] Progress Note    SUBJECTIVE:  l    Wondering about going home  Usually on 1 L oxygen at home    REVIEW OF SYSTEMS:  Negative unless as listed above.  Social history, Family history, Medications: reviewed.    OBJECTIVE:  BP (!) 140/81 (BP Location: Right arm)   Pulse 87   Temp 97.6  F (36.4  C) (Oral)   Resp 18   Ht 1.6 m (5' 3\")   Wt 147.8 kg (325 lb 13.4 oz)   SpO2 91%   BMI 57.72 kg/m                PHYSICAL EXAM:  Gen. appearance nontoxic  Eyes no conjunctivitis or icterus  Neck no stiffness or neck vein distention, no LN  Heart  No edema  Lungs breathing comfortably  Abdomen soft not tender  Extremities reviewed lymphedema no redness no tenderness (was tender before per patient0  Skin  no rash or emboli  Neurologic alert oriented no focal deficits      Antibiotics:V/merrem    Pertinent labs:    Recent Labs   Lab Test 03/19/24  0438 03/17/24  0534 03/16/24  0609   WBC 4.2 4.7 12.7*   HGB 13.6 13.9 15.4   HCT 43.8 45.3 48.7*   MCV 86 88 85    156 189       Lab Results   Component Value Date    RBC 5.16 03/17/2024    RBC 4.91 03/07/2021     Lab Results   Component Value Date    HGB 13.9 03/17/2024    HGB 12.5 03/07/2021     Lab Results   Component Value Date    HCT 45.3 03/17/2024    HCT 42.8 03/07/2021     No components found for: \"MCT\"  Lab Results   Component Value Date    MCV 88 03/17/2024    MCV 87 03/07/2021     Lab Results   Component Value Date    MCH 26.9 03/17/2024    MCH 25.5 03/07/2021     Lab Results   Component Value Date    MCHC 30.7 03/17/2024    MCHC 29.2 03/07/2021     Lab Results   Component Value Date    RDW 19.2 03/17/2024    RDW 15.6 03/07/2021     Lab Results   Component Value Date     03/17/2024     03/08/2021       Last Comprehensive Metabolic Panel:  Sodium   Date Value Ref Range Status   03/17/2024 138 135 - 145 mmol/L Final     Comment:     Reference intervals for this test were updated on 09/26/2023 to more accurately reflect our " healthy population. There may be differences in the flagging of prior results with similar values performed with this method. Interpretation of those prior results can be made in the context of the updated reference intervals.    03/07/2021 139 133 - 144 mmol/L Final     Potassium   Date Value Ref Range Status   03/17/2024 3.9 3.4 - 5.3 mmol/L Final   09/06/2022 3.6 3.5 - 5.0 mmol/L Final   03/07/2021 4.3 3.4 - 5.3 mmol/L Final     Chloride   Date Value Ref Range Status   03/17/2024 105 98 - 107 mmol/L Final   09/06/2022 98 98 - 107 mmol/L Final   03/07/2021 105 94 - 109 mmol/L Final     Carbon Dioxide   Date Value Ref Range Status   03/07/2021 30 20 - 32 mmol/L Final     Carbon Dioxide (CO2)   Date Value Ref Range Status   03/17/2024 26 22 - 29 mmol/L Final   09/06/2022 25 22 - 31 mmol/L Final     Anion Gap   Date Value Ref Range Status   03/17/2024 7 7 - 15 mmol/L Final   09/06/2022 11 5 - 18 mmol/L Final   03/07/2021 4 3 - 14 mmol/L Final     Glucose   Date Value Ref Range Status   03/17/2024 94 70 - 99 mg/dL Final   09/06/2022 94 70 - 125 mg/dL Final   03/07/2021 99 70 - 99 mg/dL Final     GLUCOSE BY METER POCT   Date Value Ref Range Status   12/22/2023 117 (H) 70 - 99 mg/dL Final     Urea Nitrogen   Date Value Ref Range Status   03/17/2024 14.1 6.0 - 20.0 mg/dL Final   09/06/2022 22 8 - 22 mg/dL Final   03/07/2021 12 7 - 30 mg/dL Final     Creatinine   Date Value Ref Range Status   03/19/2024 0.63 0.51 - 0.95 mg/dL Final   03/07/2021 0.62 0.52 - 1.04 mg/dL Final     GFR Estimate   Date Value Ref Range Status   03/19/2024 >90 >60 mL/min/1.73m2 Final   03/07/2021 >90 >60 mL/min/[1.73_m2] Final     Comment:     Non  GFR Calc  Starting 12/18/2018, serum creatinine based estimated GFR (eGFR) will be   calculated using the Chronic Kidney Disease Epidemiology Collaboration   (CKD-EPI) equation.       Calcium   Date Value Ref Range Status   03/17/2024 9.1 8.6 - 10.0 mg/dL Final   03/07/2021 9.4 8.5 -  "10.1 mg/dL Final       Liver Function Studies -   Recent Labs   Lab Test 03/16/24  0609   PROTTOTAL 8.7*   ALBUMIN 3.8   BILITOTAL 2.1*   ALKPHOS 71   AST 20   ALT 9       No results found for: \"SED\"    CRP Inflammation   Date Value Ref Range Status   03/06/2021 144.0 (H) 0.0 - 8.0 mg/L Final     CRP   Date Value Ref Range Status   02/12/2022 8.5 (H) 0.0-<0.8 mg/dL Final               MICROBIOLOGY DATA:        IMAGING/RADIOLOGY:          ASSESSMENT:    Staph lugdunensis bacteremia  TTE no vegetations  Tricuspid regurgitation with severe pulmonary hypertension  Suspected left leg cellulitis on admission  Shortness of breath, respiratory failure, on CT chest no pathology      RECOMMENDATION:  DC vancomycin  IV cefazolin for total of 14 days ending 3/30 if noticeable culture negative  Midline tomorrow  Social work  Okay to discharge tomorrow    Alyssa Call M.D.    "

## 2024-03-19 NOTE — PLAN OF CARE
Pt is alert and oriented x4, denies pain. VSS. Pt continues to be on O2, titrated down to 3L this shift. Pt on CPAP for the night with 5L O2 bled in. Pt continues to be on IV antibiotics. BLE somewhat red, swollen and have existing bumps in BLE.  Pt went back to bed at the start of the shift and stayed in bed. Purewick placed and changed once this shift. Pt was incontinent with stool x1, bed changed due to leak in purewick.       Problem: Adult Inpatient Plan of Care  Goal: Absence of Hospital-Acquired Illness or Injury  Outcome: Progressing  Intervention: Identify and Manage Fall Risk  Recent Flowsheet Documentation  Taken 3/18/2024 2045 by Jose Alberto Perkins RN  Safety Promotion/Fall Prevention:   activity supervised   assistive device/personal items within reach   clutter free environment maintained   mobility aid in reach   nonskid shoes/slippers when out of bed   room door open   safety round/check completed  Taken 3/18/2024 1645 by Jose Alberto Perkins RN  Safety Promotion/Fall Prevention:   activity supervised   assistive device/personal items within reach   clutter free environment maintained   mobility aid in reach   nonskid shoes/slippers when out of bed   room door open   safety round/check completed  Intervention: Prevent Infection  Recent Flowsheet Documentation  Taken 3/18/2024 2045 by Jose Alberto Perkins, RN  Infection Prevention:   hand hygiene promoted   rest/sleep promoted   single patient room provided  Taken 3/18/2024 1645 by Jose Alberto Perkins, RN  Infection Prevention:   hand hygiene promoted   rest/sleep promoted   single patient room provided     Problem: Infection  Goal: Absence of Infection Signs and Symptoms  Outcome: Progressing     Problem: Gas Exchange Impaired  Goal: Optimal Gas Exchange  Outcome: Progressing     Problem: Skin or Soft Tissue Infection  Goal: Absence of Infection Signs and Symptoms  Outcome: Progressing  Intervention: Minimize and Manage Infection Progression  Recent  Flowsheet Documentation  Taken 3/18/2024 2045 by Jose Alberto Perkins RN  Infection Prevention:   hand hygiene promoted   rest/sleep promoted   single patient room provided  Taken 3/18/2024 1645 by Jose Alberto Perkins RN  Infection Prevention:   hand hygiene promoted   rest/sleep promoted   single patient room provided     Problem: Heart Failure  Goal: Effective Oxygenation and Ventilation  Intervention: Promote Airway Secretion Clearance  Recent Flowsheet Documentation  Taken 3/18/2024 2045 by Jose Alberto Perkins RN  Cough And Deep Breathing: done independently per patient  Taken 3/18/2024 1645 by Jose Alberto Perkins RN  Cough And Deep Breathing: done independently per patient  Goal: Effective Breathing Pattern During Sleep  Intervention: Monitor and Manage Obstructive Sleep Apnea  Recent Flowsheet Documentation  Taken 3/18/2024 2045 by Jose Alberto Perkins RN  Medication Review/Management: medications reviewed  Taken 3/18/2024 1645 by Jose Alberto Perkins RN  Medication Review/Management: medications reviewed   Goal Outcome Evaluation:

## 2024-03-19 NOTE — PROGRESS NOTES
"Lymphedema Discharge Summary    Reason for therapy discharge:    No further expectations of functional progress.    Progress towards therapy goal(s). See goals on Care Plan in [facility] electronic health record for goal details.  Goals met    Therapy recommendation(s):    No further therapy is recommended.  Pt owns velcro wraps and wears daily at baseline. Donned wraps on B LEs and appear to be fitting well. No further acute lymphedema therapy needs. Pt can follow-up with an OP Lymphedema therapist as needed.        03/19/24 1400   Appointment Info   Signing Clinician's Name / Credentials (PT) Rajani Wilcox DPT   Quick Adds   Quick Adds Edema Eval   Living Environment   People in Home alone   Current Living Arrangements house   General Information   Onset of Illness/Injury or Date of Surgery 03/16/24   Referring Physician Maricruz Duncan MD   Patient/Family Therapy Goals Statement (PT) none   Pertinent History of Current Problem (include personal factors and/or comorbidities that impact the POC) 48 year old female with PMH significant for morbid obesity, lymphedema, NARCISA on CPAP, OHS, depression, HFpEF, PE (not on AC) who presented to our ED for evaluation of leg pain, fever and chills.   Integumentary/Edema   Onset of Edema   (chronic)   Affected Body Part(s) Left LE;Right LE   Edema Etiology Infection   Edema Precautions Acute infection   Edema Precaution Comments On vanco since 3/17.   Edema Examination/Assessment   Skin Condition Comments Per chart: \"Chronic LE lymphedema with induration and verrucous changes. . Redness and induration of the tissue over the inner thigh, left worse than the right. The redness over the feet and calf area aren't new per patient\".   Ulcerations No   Stemmer Sign Negative   Skin Integrity B LEs: discolored, hyperkaratosis, raised bumps   Pitting Assessment non-pitting edema  (L > R)   Clinical Impression   Criteria for Skilled Therapeutic Intervention Yes, treatment indicated "   Edema: Patient Presentation Edema;Stage 1 Lymphedema   Clinical Presentation (PT Evaluation Complexity) stable   Clinical Presentation Rationale presents as medically diagnosed   Clinical Decision Making (Complexity) moderate complexity   Edema: Planned Interventions Fit for compression garment;Education   PT Total Evaluation Time   PT Eval, Moderate Complexity Minutes (96092)   (Eval already billed during PT)   Physical Therapy Goals   PT Frequency One time eval and treatment only   PT Goals Edema   PT: Edema education to increase ability to manage edema after discharge from the hospital Patient;Caregiver;Verbalize;Skin care routine;signs/symptoms of intolerance;wear schedule;limb positioning;garment/bandage care;discharge recommendations;Goal Met   Interventions   Interventions Quick Adds Self-Care/Home Mgmt   Self-Care/Home Management   Self-Care/Home Mgmt/ADL, Compensatory, Meal Prep Minutes (74907) 12   Symptoms Noted During/After Treatment none   Treatment Detail/Skilled Intervention Pt's velcro wraps in closet. Appear to be intact. Opted to keep slipper socks on under velcro wraps due to pt needing to transfer to commode frequently. Donned velcro wraps on B LEs. Wraps fitting well. Pt has no further concerns regarding compression, has no problems with donning wraps at home. Will complete orders.   PT Discharge Planning   PT Discharge Recommendation (DC Rec) home with assist;home with home care physical therapy;Per plan established by the PT   Total Session Time   Timed Code Treatment Minutes 12   Total Session Time (sum of timed and untimed services) 12         Rajani Wilocx, PT, DPT  3/19/2024

## 2024-03-19 NOTE — PLAN OF CARE
Problem: Gas Exchange Impaired  Goal: Optimal Gas Exchange  Outcome: Progressing     Problem: Skin or Soft Tissue Infection  Goal: Absence of Infection Signs and Symptoms  Outcome: Progressing  Intervention: Minimize and Manage Infection Progression  Recent Flowsheet Documentation  Taken 3/19/2024 1433 by Doyle Hernandez RN  Infection Prevention: hand hygiene promoted   Goal Outcome Evaluation:             Pt denies pain today.  Pt was on CPAP 6L overnight.  When pt woke she was placed on 3L NC then weaned to 2L NC.       Pt was transferred to chair with assist of 1.   Pt had some bright red blood dripping when standing.  Pt reports having trouble with hemorrhoids.  Hgb was stable this morning.     Pt will have lymphedema wraps this afternoon.        Pt got up from toilet and sat in chair and reported feelin lightheaded and dizzy.  Writer

## 2024-03-19 NOTE — PROGRESS NOTES
[facility]    Medicine Progress Note - Hospitalist Service    Date of Admission:  3/16/2024    Assessment & Plan   Patient is a 48 year old female with PMH significant for morbid obesity, lymphedema, NARCISA on CPAP, OHS, depression, HFpEF, PE (not on AC) who presented to our ED for evaluation of leg pain, fever and chills.      Sepsis  Thigh cellulitis  -Patient presented with fever/chills, sinus tachycarida, thigh pain and leucocytosis in the context of chronic lymphedema  -Blood culture from 3/16 grew Staph Lugdunensis. B/C from 4/17- in process. UA- negative.  -Off Meropenem  -Continue with IV vancomycin per ID   -ID [id number] assistance    Leg swelling  Cough/shortness of breath  -Likely combination of cellulitis, CHF again in the context of chronic lymphedema. H/O PE noted per chart review. No PE now per CTA chest. Venous duplex- no DVT  -BNP 3475. No prior BNP for comparison. Echo as of 12/2023 showed EF of 50-55%. Repeat echo here showed EF >65%  -Chest xray shows mild venous congestion. But no infiltrate. Received 2mg of IV bumex in ED. Cont home dose of PO bumex.     Mild trop elevation  -Trop of 37 noted. EKG showed accelerated junction rhythm with prolong Qtc  -Trend trop. Check echo- pending     Prolong QTc  -QTc of 638 noted on EKG. Potassium is normal. Normal Mg  -Avoid QT prolong meds. Hold sertraline (low risk for QT prolongation)     Rectal bleeding, intermittent  -Bright red with stools per patient. Suspect from hemorrhoids/outlet bleeding  -Hgb is stable. Check labs in am     NARCISA  OHS  -Cont CPAP at night. Supplemental O2      H/O PE  -Few years ago. Was on AC only for few months per patient. Stopped taking AC per her MD's recommendation per patient          Diet: Combination Diet Low Saturated Fat Na <2400mg Diet, No Caffeine Diet    DVT Prophylaxis: Pneumatic Compression Devices  Brar Catheter: Not present  Lines: None     Cardiac Monitoring: ACTIVE order.  "Indication: QTc prolonging medication (48 hours)  Code Status: Full Code      Clinically Significant Risk Factors                  # Hypertension: Noted on problem list  # Chronic heart failure with preserved ejection fraction: heart failure noted on problem list and last echo with EF >50%       # Severe Obesity: Estimated body mass index is 57.72 kg/m  as calculated from the following:    Height as of this encounter: 1.6 m (5' 3\").    Weight as of this encounter: 147.8 kg (325 lb 13.4 oz)., PRESENT ON ADMISSION       # Financial/Environmental Concerns:           Disposition Plan      Expected Discharge Date: 03/20/2024      Destination: home with help/services  Discharge Comments: iv abx            Maricruz Duncan MD  Hospitalist Service  [facility]  Securely message with [employer] (more info)  Text page via [employer] Paging/Directory   ______________________________________________________________________    Interval History   No new complaints today and no acute events overnight. She reports significant improvement in pain in her left lower extremity. She mentions having previously experienced hypotension and HUNG as a result of diuretic therapy, thus expressing a desire for caution with future use of diuretics.      Physical Exam   Vital Signs: Temp: 97.6  F (36.4  C) Temp src: Oral BP: (!) 140/81 Pulse: 87   Resp: 18 SpO2: 91 % O2 Device: Nasal cannula Oxygen Delivery: 2 LPM  Weight: 325 lbs 13.44 oz      General: Not in obvious distress.  HEENT: NC, AT   Chest: Clear to auscultation bilaterally  Heart: S1S2 normal, regular. No M/R/G  Abdomen: Soft. NT, ND. Bowel sounds- active.  Extremities: Chronic LE lymphedema with induration and verrucous changes. . Redness and induration of the tissue over the inner thigh, left worse than the right. The redness over the feet and calf area aren't new per patient.    Neuro: alert and awake, grossly non-focal      Medical Decision Making             Data "

## 2024-03-20 ENCOUNTER — HOME INFUSION (PRE-WILLOW HOME INFUSION) (OUTPATIENT)
Dept: PHARMACY | Facility: CLINIC | Age: 49
End: 2024-03-20
Payer: MEDICARE

## 2024-03-20 VITALS
BODY MASS INDEX: 51.91 KG/M2 | SYSTOLIC BLOOD PRESSURE: 136 MMHG | RESPIRATION RATE: 20 BRPM | HEART RATE: 85 BPM | HEIGHT: 63 IN | TEMPERATURE: 98 F | DIASTOLIC BLOOD PRESSURE: 88 MMHG | WEIGHT: 293 LBS | OXYGEN SATURATION: 91 %

## 2024-03-20 LAB
ATRIAL RATE - MUSE: 84 BPM
CREAT SERPL-MCNC: 0.66 MG/DL (ref 0.51–0.95)
DIASTOLIC BLOOD PRESSURE - MUSE: NORMAL MMHG
EGFRCR SERPLBLD CKD-EPI 2021: >90 ML/MIN/1.73M2
HOLD SPECIMEN: NORMAL
INTERPRETATION ECG - MUSE: NORMAL
P AXIS - MUSE: 60 DEGREES
PR INTERVAL - MUSE: 192 MS
QRS DURATION - MUSE: 114 MS
QT - MUSE: 402 MS
QTC - MUSE: 475 MS
R AXIS - MUSE: 124 DEGREES
SYSTOLIC BLOOD PRESSURE - MUSE: NORMAL MMHG
T AXIS - MUSE: -83 DEGREES
VENTRICULAR RATE- MUSE: 84 BPM

## 2024-03-20 PROCEDURE — 36415 COLL VENOUS BLD VENIPUNCTURE: CPT | Performed by: INTERNAL MEDICINE

## 2024-03-20 PROCEDURE — 93010 ELECTROCARDIOGRAM REPORT: CPT | Mod: HIP | Performed by: INTERNAL MEDICINE

## 2024-03-20 PROCEDURE — 258N000003 HC RX IP 258 OP 636: Performed by: INTERNAL MEDICINE

## 2024-03-20 PROCEDURE — 250N000009 HC RX 250: Performed by: INTERNAL MEDICINE

## 2024-03-20 PROCEDURE — 250N000013 HC RX MED GY IP 250 OP 250 PS 637: Performed by: INTERNAL MEDICINE

## 2024-03-20 PROCEDURE — 99239 HOSP IP/OBS DSCHRG MGMT >30: CPT | Performed by: INTERNAL MEDICINE

## 2024-03-20 PROCEDURE — 250N000011 HC RX IP 250 OP 636: Performed by: INTERNAL MEDICINE

## 2024-03-20 PROCEDURE — 82565 ASSAY OF CREATININE: CPT | Performed by: INTERNAL MEDICINE

## 2024-03-20 PROCEDURE — 99232 SBSQ HOSP IP/OBS MODERATE 35: CPT | Performed by: INTERNAL MEDICINE

## 2024-03-20 PROCEDURE — 272N000450 HC KIT 4FR POWER PICC SINGLE LUMEN

## 2024-03-20 PROCEDURE — 36569 INSJ PICC 5 YR+ W/O IMAGING: CPT

## 2024-03-20 PROCEDURE — 93005 ELECTROCARDIOGRAM TRACING: CPT

## 2024-03-20 RX ORDER — LIDOCAINE 40 MG/G
CREAM TOPICAL
Status: DISCONTINUED | OUTPATIENT
Start: 2024-03-20 | End: 2024-03-20 | Stop reason: HOSPADM

## 2024-03-20 RX ORDER — CEFAZOLIN SODIUM 2 G/100ML
2 INJECTION, SOLUTION INTRAVENOUS EVERY 8 HOURS
Qty: 4200 ML | Refills: 0 | Status: SHIPPED | OUTPATIENT
Start: 2024-03-20 | End: 2024-03-20

## 2024-03-20 RX ORDER — HEPARIN SODIUM,PORCINE 10 UNIT/ML
5-10 VIAL (ML) INTRAVENOUS EVERY 24 HOURS
Status: DISCONTINUED | OUTPATIENT
Start: 2024-03-20 | End: 2024-03-20 | Stop reason: HOSPADM

## 2024-03-20 RX ORDER — HEPARIN SODIUM,PORCINE 10 UNIT/ML
5-10 VIAL (ML) INTRAVENOUS
Status: DISCONTINUED | OUTPATIENT
Start: 2024-03-20 | End: 2024-03-20 | Stop reason: HOSPADM

## 2024-03-20 RX ORDER — LIDOCAINE 40 MG/G
CREAM TOPICAL
Status: DISCONTINUED | OUTPATIENT
Start: 2024-03-20 | End: 2024-03-20

## 2024-03-20 RX ORDER — CEFAZOLIN SODIUM 1 G/50ML
2500 SOLUTION INTRAVENOUS ONCE
Status: DISCONTINUED | OUTPATIENT
Start: 2024-03-20 | End: 2024-03-20

## 2024-03-20 RX ORDER — CEFAZOLIN SODIUM 1 G/50ML
2000 SOLUTION INTRAVENOUS ONCE
Status: COMPLETED | OUTPATIENT
Start: 2024-03-20 | End: 2024-03-20

## 2024-03-20 RX ADMIN — BUMETANIDE 3 MG: 2 TABLET ORAL at 08:53

## 2024-03-20 RX ADMIN — CEFAZOLIN SODIUM 2 G: 2 INJECTION, SOLUTION INTRAVENOUS at 06:02

## 2024-03-20 RX ADMIN — LIDOCAINE HYDROCHLORIDE 2 ML: 10 INJECTION, SOLUTION EPIDURAL; INFILTRATION; INTRACAUDAL; PERINEURAL at 15:13

## 2024-03-20 RX ADMIN — ASPIRIN 81 MG: 81 TABLET, COATED ORAL at 08:54

## 2024-03-20 RX ADMIN — VANCOMYCIN HYDROCHLORIDE 2000 MG: 5 INJECTION, POWDER, LYOPHILIZED, FOR SOLUTION INTRAVENOUS at 13:56

## 2024-03-20 RX ADMIN — METOPROLOL SUCCINATE 25 MG: 25 TABLET, EXTENDED RELEASE ORAL at 08:54

## 2024-03-20 ASSESSMENT — ACTIVITIES OF DAILY LIVING (ADL)
ADLS_ACUITY_SCORE: 51

## 2024-03-20 NOTE — PROGRESS NOTES
Physical Therapy Discharge Summary    Reason for therapy discharge:    Discharged to home, resuming home PT.    Progress towards therapy goal(s). See goals on Care Plan in [facility] electronic health record for goal details.  Goals partially met.  Barriers to achieving goals:   discharge from facility.    Therapy recommendation(s):    Further recommended therapy is related to documented deficits, and is necessary to maximize functional independence in order for patient to return to prior level of function.      Liv Carey, KAVONT 3/20/2024

## 2024-03-20 NOTE — PROCEDURES
"PICC Line Insertion Procedure Note  Pt. Name: Bess Enamorado  MRN:        [id number]    Procedure: Insertion of a  single Lumen  4 fr  Bard SOLO (valved) Power PICC, Lot number [id number]    Indications: antibiotic    Contraindications : none    Procedure Details     Patient identified with 2 identifiers and \"Time Out\" conducted.  .     Central line insertion bundle followed: hand hygiene performed prior to procedure, site cleansed with cholraprep, hat, mask, sterile gloves, sterile gown worn, patient draped with maximum barrier head to toe drape, sterile field maintained.    The vein was assessed and found to be compressible and of adequate size.     Lidocaine 1% 2 ml administered sq to the insertion site. A 5 Fr PICC was inserted into the basilic vein of the right arm with ultrasound guidance. 1 attempt(s) required to access vein.   Catheter threaded without difficulty. Good blood return noted.    Modified Seldinger Technique used for insertion.    The 8 sharps that are included in the PICC insertion kit were accounted for and disposed of in the sharps container prior to breakdown of the sterile field.    Catheter secured with Statlock, biopatch and Tegaderm dressing applied.    Findings:    Total catheter length  46 cm, with 5 cm exposed. Mid upper arm circumference is 43 cm. Catheter was flushed with 30 cc NS. Patient  tolerated procedure well.    Tip placement verified by 3CG technology . Tip placement in the SVC.    CLABSI prevention brochure left at bedside.    Patient's primary RN notified PICC is ready for use.      Comments:        Cecille Colunga PICC RN  Vascular Access - [facility]      "

## 2024-03-20 NOTE — PROGRESS NOTES
Care Management Discharge Note    Discharge Date: 03/20/2024       Discharge Disposition: Home Care, Home Infusion    Discharge Services: None    Discharge DME: None    Discharge Transportation: family or friend will provide    Private pay costs discussed:  [facility] discussed costs for home IV antibiotics    Does the patient's insurance plan have a 3 day qualifying hospital stay waiver?  No    PAS Confirmation Code: NA  Patient/family educated on Medicare website which has current facility and service quality ratings:      Education Provided on the Discharge Plan: Yes  Persons Notified of Discharge Plans: patient, MD, [facility], nurse  Patient/Family in Agreement with the Plan: yes    Handoff Referral Completed: Yes    Additional Information:  Patient discharging home with [facility] and resumption of home care SN, PT, OT with [facility].    ID changed antibiotic from Cefazolin to Vancomycin.  [facility] needs to get a new price quote and patient will need PICC instead of midline.  Nurse has reach out to provider for PICC order.    ID needs to order vancomycin in discharge orders.   CM messaged ID regarding discharge medication.  Vanco ordered on discharge medications.  CM notified [facility].  I will process and set patient up with home infusion.    Kendal Love RN

## 2024-03-20 NOTE — PLAN OF CARE
Problem: Infection  Goal: Absence of Infection Signs and Symptoms  Outcome: Progressing     Problem: Gas Exchange Impaired  Goal: Optimal Gas Exchange  Outcome: Progressing   Goal Outcome Evaluation:         Pt was placed on 1L NC upon waking up this morning.    Pt denies pain, nausea, dizziness.  Pt states she coughs for a little when waking up then improves.     Pt will have picc placed today and then discharge to home. Vancomycin will be new abx per pharmacy

## 2024-03-20 NOTE — PLAN OF CARE
Occupational Therapy Discharge Summary    Reason for therapy discharge:    Discharged to home with home therapy.    Progress towards therapy goal(s). See goals on Care Plan in [facility] electronic health record for goal details.  Goals partially met.  Barriers to achieving goals:   discharge from facility.    Therapy recommendation(s):    Continued therapy is recommended.  Rationale/Recommendations:  recommend resumption of home care services.    Rajani Ogden OTR/L 3/20/24

## 2024-03-20 NOTE — PLAN OF CARE
Problem: Infection  Goal: Absence of Infection Signs and Symptoms  Outcome: Progressing    Problem: Gas Exchange Impaired  Goal: Optimal Gas Exchange  Outcome: Progressing  Intervention: Optimize Oxygenation and Ventilation  Recent Flowsheet Documentation  Taken 3/19/2024 1830 by Nessa Olmedo RN  Head of Bed (HOB) Positioning: HOB at 30-45 degrees  Taken 3/19/2024 1528 by Nessa Olmedo RN  Head of Bed (HOB) Positioning: HOB at 30 degrees     Problem: Skin or Soft Tissue Infection  Goal: Absence of Infection Signs and Symptoms  Outcome: Progressing  Intervention: Minimize and Manage Infection Progression  Recent Flowsheet Documentation  Taken 3/19/2024 1830 by Nessa Olmedo RN  Infection Prevention: hand hygiene promoted  Taken 3/19/2024 1528 by Nessa Olmedo RN  Infection Prevention: hand hygiene promoted      Goal Outcome Evaluation:       Pt is A/O x 4. Assist x 1 to stand up and SBA to ambulate. Was on 2L. Weaned down to 1L and keeping SpO2 above 92%. VSS. Tele: NSR. Presenting some congested cough. No pain or SOB this shift. At beginning of shift, while ambulating from commode to bed, pt reported feeling dizzy and nauseated. Day shift nurse helped pt back to bed. MD notified and compazine PRN order placed. Pt refused medication stating feeling better after going back to bed. Lymph wraps placed today. Plan for midline placement tomorrow to finish IV antibiotics at home.

## 2024-03-20 NOTE — PHARMACY-VANCOMYCIN DOSING SERVICE
Pharmacy Vancomycin Note  Date of Service 2024  Patient's  1975   48 year old, female    Indication: Bacteremia  Day of Therapy: 5  Current vancomycin regimen:  1500 mg IV q12h was discontinued yesterday and now re-started due to new sensitivities to culture results.    Current estimated CrCl = Estimated Creatinine Clearance: 148.8 mL/min (based on SCr of 0.66 mg/dL).    Creatinine for last 3 days  3/18/2024:  4:57 AM Creatinine 0.73 mg/dL  3/19/2024:  4:38 AM Creatinine 0.63 mg/dL  3/20/2024:  4:41 AM Creatinine 0.66 mg/dL    Recent Vancomycin Levels (past 3 days)  3/17/2024:  3:13 PM Vancomycin 13.1 ug/mL    Vancomycin IV Administrations (past 72 hours)                     vancomycin (VANCOCIN) 1,500 mg in sodium chloride 0.9 % 250 mL intermittent infusion (mg) 1,500 mg New Bag 24 0540     1,500 mg New Bag 24 1804     1,500 mg New Bag  0653     1,500 mg New Bag 24 1826                    Nephrotoxins and other renal medications (From now, onward)      Start     Dose/Rate Route Frequency Ordered Stop    24 1330  vancomycin (VANCOCIN) 1,500 mg in sodium chloride 0.9 % 250 mL intermittent infusion         1,500 mg  over 90 Minutes Intravenous ONCE 24 1324      24 0800  bumetanide (BUMEX) tablet 3 mg        Note to Pharmacy: PTA Sig:Take 3 tablets (3 mg) by mouth daily      3 mg Oral DAILY 24 1229                 Contrast Orders - past 72 hours (72h ago, onward)      None            Has serum creatinine changed greater than 50% in last 72 hours: No    Urine output:  good urine output    Renal Function: Stable    InsightRX Prediction of Planned New Vancomycin Regimen  Loading dose: 2000 mg IV once now  Regimen: 1500 mg IV every 12 hours.  Start time: 00:00 on 2024  Exposure target: AUC24 (range)400-600 mg/L.hr   AUC24,ss: 474 mg/L.hr  Probability of AUC24 > 400: 84 %  Ctrough,ss: 10.4 mg/L  Probability of Ctrough,ss > 20: 0 %  Probability of  nephrotoxicity (Lodise ARMANDO 2009): 6 %        Plan:  Last dose of vancomycin was yesterday morning. Give a loading dose of 2000 mg followed by previous dosing of 1500mg IV q12h starting at midnight.  Vancomycin monitoring method: AUC  Vancomycin therapeutic monitoring goal: 400-600 mg*h/L  Pharmacy will check vancomycin levels as appropriate in 1-3 Days.  Serum creatinine levels will be ordered daily for the first week of therapy and at least twice weekly for subsequent weeks.    Audrey Mcconnell, FIOR

## 2024-03-20 NOTE — PROGRESS NOTES
Therapy: IV ABX (Cefazolin)  Insurance: Self-pay    The patient has all Medicare products, which do not cover IV ABX (Cefazolin) in the home.   (Pt would have coverage for short term TCU or IC).   Below is what the patient would be responsible for if the patient wanted to go with [facility].   - Drug would go to the Part-D (Prescription Plan) - Pt would be responsible for the co-pay per dispense.   - Patient would have to self-pay for supplies. The self-pay quote estimate for drug (Part-D copay) and supplies is $30.33 per day.  - If not homebound, nursing would also be self-pay $90.00 per visit.    In reference to hospital admission on 03/16/2024 at [facility] and referral from Alicia Meyer.    Please contact Intake with any questions, [phone] or In Basket pool, [facility] ([phone]).

## 2024-03-20 NOTE — PLAN OF CARE
Goal Outcome Evaluation:  Pt completed loading dose of IV Vancomycin, saline locked now. Pt is discharging home with PICC line for IV Antibiotics. [facility] pharmacy to send IV Vanco thru mail in order, pt to receive meds tonight.  Primary care provider follow up appointment was set up. Discussed AVS and personal belongings (CPAP, clothes, electronics) returned.

## 2024-03-20 NOTE — PROGRESS NOTES
"[facility] Progress Note    SUBJECTIVE:  l    Wondering about going home  Usually on 1 L oxygen at home    REVIEW OF SYSTEMS:  Negative unless as listed above.  Social history, Family history, Medications: reviewed.    OBJECTIVE:  /62 (BP Location: Right arm)   Pulse 90   Temp 97.9  F (36.6  C) (Axillary)   Resp 20   Ht 1.6 m (5' 3\")   Wt 150 kg (330 lb 11 oz)   SpO2 91%   BMI 58.58 kg/m                PHYSICAL EXAM:  Gen. appearance nontoxic  Eyes no conjunctivitis or icterus  Neck no stiffness or neck vein distention, no LN  Heart  No edema  Lungs breathing comfortably  Abdomen soft not tender  Extremities reviewed lymphedema no redness no tenderness (was tender before per patient0  Skin  no rash or emboli  Neurologic alert oriented no focal deficits      Antibiotics:V/merrem    Pertinent labs:    Recent Labs   Lab Test 03/19/24  0438 03/17/24  0534 03/16/24  0609   WBC 4.2 4.7 12.7*   HGB 13.6 13.9 15.4   HCT 43.8 45.3 48.7*   MCV 86 88 85    156 189       Lab Results   Component Value Date    RBC 5.16 03/17/2024    RBC 4.91 03/07/2021     Lab Results   Component Value Date    HGB 13.9 03/17/2024    HGB 12.5 03/07/2021     Lab Results   Component Value Date    HCT 45.3 03/17/2024    HCT 42.8 03/07/2021     No components found for: \"MCT\"  Lab Results   Component Value Date    MCV 88 03/17/2024    MCV 87 03/07/2021     Lab Results   Component Value Date    MCH 26.9 03/17/2024    MCH 25.5 03/07/2021     Lab Results   Component Value Date    MCHC 30.7 03/17/2024    MCHC 29.2 03/07/2021     Lab Results   Component Value Date    RDW 19.2 03/17/2024    RDW 15.6 03/07/2021     Lab Results   Component Value Date     03/17/2024     03/08/2021       Last Comprehensive Metabolic Panel:  Sodium   Date Value Ref Range Status   03/17/2024 138 135 - 145 mmol/L Final     Comment:     Reference intervals for this test were updated on 09/26/2023 to more accurately reflect our " healthy population. There may be differences in the flagging of prior results with similar values performed with this method. Interpretation of those prior results can be made in the context of the updated reference intervals.    03/07/2021 139 133 - 144 mmol/L Final     Potassium   Date Value Ref Range Status   03/17/2024 3.9 3.4 - 5.3 mmol/L Final   09/06/2022 3.6 3.5 - 5.0 mmol/L Final   03/07/2021 4.3 3.4 - 5.3 mmol/L Final     Chloride   Date Value Ref Range Status   03/17/2024 105 98 - 107 mmol/L Final   09/06/2022 98 98 - 107 mmol/L Final   03/07/2021 105 94 - 109 mmol/L Final     Carbon Dioxide   Date Value Ref Range Status   03/07/2021 30 20 - 32 mmol/L Final     Carbon Dioxide (CO2)   Date Value Ref Range Status   03/17/2024 26 22 - 29 mmol/L Final   09/06/2022 25 22 - 31 mmol/L Final     Anion Gap   Date Value Ref Range Status   03/17/2024 7 7 - 15 mmol/L Final   09/06/2022 11 5 - 18 mmol/L Final   03/07/2021 4 3 - 14 mmol/L Final     Glucose   Date Value Ref Range Status   03/17/2024 94 70 - 99 mg/dL Final   09/06/2022 94 70 - 125 mg/dL Final   03/07/2021 99 70 - 99 mg/dL Final     GLUCOSE BY METER POCT   Date Value Ref Range Status   12/22/2023 117 (H) 70 - 99 mg/dL Final     Urea Nitrogen   Date Value Ref Range Status   03/17/2024 14.1 6.0 - 20.0 mg/dL Final   09/06/2022 22 8 - 22 mg/dL Final   03/07/2021 12 7 - 30 mg/dL Final     Creatinine   Date Value Ref Range Status   03/20/2024 0.66 0.51 - 0.95 mg/dL Final   03/07/2021 0.62 0.52 - 1.04 mg/dL Final     GFR Estimate   Date Value Ref Range Status   03/20/2024 >90 >60 mL/min/1.73m2 Final   03/07/2021 >90 >60 mL/min/[1.73_m2] Final     Comment:     Non  GFR Calc  Starting 12/18/2018, serum creatinine based estimated GFR (eGFR) will be   calculated using the Chronic Kidney Disease Epidemiology Collaboration   (CKD-EPI) equation.       Calcium   Date Value Ref Range Status   03/17/2024 9.1 8.6 - 10.0 mg/dL Final   03/07/2021 9.4 8.5 -  "10.1 mg/dL Final       Liver Function Studies -   Recent Labs   Lab Test 03/16/24  0609   PROTTOTAL 8.7*   ALBUMIN 3.8   BILITOTAL 2.1*   ALKPHOS 71   AST 20   ALT 9       No results found for: \"SED\"    CRP Inflammation   Date Value Ref Range Status   03/06/2021 144.0 (H) 0.0 - 8.0 mg/L Final     CRP   Date Value Ref Range Status   02/12/2022 8.5 (H) 0.0-<0.8 mg/dL Final               MICROBIOLOGY DATA:        IMAGING/RADIOLOGY:          ASSESSMENT:    Staph lugdunensis bacteremia (this is similar to staph aureus)  Oxacillin resistant  TTE no vegetations  Tricuspid regurgitation with severe pulmonary hypertension  Suspected left leg cellulitis on admission  Shortness of breath, respiratory failure, on CT chest no pathology      RECOMMENDATION:    Discontinue IVcefazolin   Do vancomycin for total of 14 days ending 3/30  Midline   Social work  Will sign off, please call with questions      Alyssa Call M.D.  Visit time 35 min >50% counseling and coordination of care    "

## 2024-03-20 NOTE — DISCHARGE SUMMARY
"[facility]  Hospitalist Discharge Summary      Date of Admission:  3/16/2024  Date of Discharge:  3/20/2024  Discharging Provider: Maricruz Duncan MD  Discharge Service: Hospitalist Service    Discharge Diagnoses   Left lower extremity cellulitis  Sepsis  Chronic lymphedema  Rectal bleed  Obesity hypoventilation syndrome  Obstructive sleep apnea      Clinically Significant Risk Factors     # Severe Obesity: Estimated body mass index is 58.58 kg/m  as calculated from the following:    Height as of this encounter: 1.6 m (5' 3\").    Weight as of this encounter: 150 kg (330 lb 11 oz).       Follow-ups Needed After Discharge   Follow-up Appointments     Follow-up and recommended labs and tests       Follow up with primary care provider, Mikala Luna, within 7 days for   hospital follow- up.   Continue IV cefazolin for the next 14 days per ID recommendation (to be   completed 3/30/24).          Unresulted Labs Ordered in the Past 30 Days of this Admission       Date and Time Order Name Status Description    3/19/2024  2:01 PM Blood Culture Hand, Left Preliminary     3/19/2024  2:01 PM Blood Culture Hand, Right Preliminary     3/17/2024 11:59 AM Blood Culture Hand, Left Preliminary     3/17/2024 11:59 AM Blood Culture Hand, Right Preliminary         These results will be followed up by Mikala Luna      Discharge Disposition   Discharged to home  Condition at discharge: Stable    Hospital Course   Bess Enamorado is a 48 year old female with history of morbid obesity, bilateral chronic lymphedema, NARCISA on CPAP, OHS, depression, HFpEF, PE (not on AC) admitted on 3/16/2024 with leg pain, fever and chills secondary to left lower extremity cellulitis with sepsis.    Blood culture sent to the lab on 3/16/2024 grew Staphylococcus lugdunensis.  She received IV meropenem and IV vancomycin briefly and was subsequently transitioned to IV cefazolin and later IV vancomycin at discharge (to be completed " on 3/30/2024) as recommended by infectious disease specialist.    She received IV bumetanide in the ER due to concern for pulmonary edema in the setting of chronic HFpEF and was subsequently placed on PTA dosage of oral bumetanide.  Echocardiogram 3/16/2024 revealed normal LV systolic function with LVEF of 65%, moderate to severely dilated right ventricle, and severe pulmonary hypertension.  Pulmonary CT angiogram was negative for pulmonary embolism and lower extremity venous ultrasound was negative for DVT.    Symptoms have improved significantly and patient is clinically stable for discharge at this time.     Consultations This Hospital Stay   PHARMACY TO DOSE VANCO  PHARMACY TO DOSE VANCO  PHYSICAL THERAPY ADULT IP CONSULT  OCCUPATIONAL THERAPY ADULT IP CONSULT  INFECTIOUS DISEASES IP CONSULT  CARE MANAGEMENT / SOCIAL WORK IP CONSULT  LYMPHEDEMA THERAPY IP CONSULT  VASCULAR ACCESS ADULT IP CONSULT    Code Status   Full Code    Time Spent on this Encounter   I, Maricruz Duncan MD, personally saw the patient today and spent greater than 30 minutes discharging this patient.       Maricruz Duncan MD  [place] [id number]  Phone: [phone]  Fax: [phone]  ______________________________________________________________________    Physical Exam   Vital Signs: Temp: 97.9  F (36.6  C) Temp src: Axillary BP: 101/62 Pulse: 90   Resp: 20 SpO2: 91 % O2 Device: Nasal cannula Oxygen Delivery: 1 LPM  Weight: 330 lbs 11.04 oz    General appearance: Morbidly obese, deconditioned, awake, Alert, Cooperative, not in any obvious distress and appears stated age   HEENT: Normocephalic, atraumatic, conjunctiva clear without icterus and ears without discharge  Lungs: Clear to auscultation bilaterally, no wheezing, good air exchange, normal work of breathing  Cardiovascular: Regular Rate and Rythm, normal apical impulse, normal S1 and S2, no lower  extremity edema bilaterally  Abdomen: Soft, non-tender and Non-distended, active bowel sounds  Skin: Significant bilateral swelling extending from the toes to the knees, with non-pitting edema, several large, firm nodules with areas of lichenification and hyperpigmentation.  Musculoskeletal: Bilateral chronic lymphedema in the lower extremities  Neurologic: Alert & Oriented X 3, Facial symmetry preserved and upper & lower extremities moving well with symmetry  Psychiatric: Calm, normal eye contact and normal affect         Primary Care Physician   Mikala Luna    Discharge Orders      Home Care Referral      Reason for your hospital stay    Left lower extremity cellulitis  Sepsis  Chronic lymphedema  Rectal bleed  Obesity hypoventilation syndrome  Obstructive sleep apnea     Follow-up and recommended labs and tests     Follow up with primary care provider, Mikala Luna, within 7 days for hospital follow- up.   Continue IV cefazolin for the next 14 days per ID recommendation (to be completed 3/30/24).     Activity    Your activity upon discharge: activity as tolerated     CPAP - Continue Home CPAP    Continue Home CPAP per home equipment settings.     Incentive Spirometry    Continue to use incentive spirometer 4 times a day Until return to normal activity     Home Oxygen Order for DME - ONLY FOR DME    Oxygen Documentation  I certify that this patient, Bess Enamorado has been under my care (or a nurse practitioner or physician's assistant working with me). This is the face-to-face encounter for oxygen medical necessity.      At the time of this encounter, I have reviewed the qualifying testing and have determined that supplemental oxygen is reasonable and necessary and is expected to improve the patient's condition in a home setting.       Patient has continued oxygen desaturation due to Chronic Respiratory Failure with Hypoxia J96.11  Obesity hypoventilation syndrome.    If portability is ordered, is the  patient mobile within the home? yes     Diet    Follow this diet upon discharge: Orders Placed This Encounter      Combination Diet Low Saturated Fat Na <2400mg Diet, No Caffeine Diet       Significant Results and Procedures   Results for orders placed or performed during the hospital encounter of 03/16/24   XR Chest Port 1 View    Narrative    EXAM: XR CHEST PORTABLE 1 VIEW  LOCATION: [facility]  DATE: 03/16/2024    INDICATION: Cough.  COMPARISON: 12/26/2023.      Impression    IMPRESSION: No significant interval change. Cardiac enlargement and mild pulmonary venous congestion. Mild elevation of the right hemidiaphragm. No pneumothorax.     CT Chest Pulmonary Embolism w Contrast    Narrative    EXAM: CT CHEST PULMONARY EMBOLISM W CONTRAST, CT ABDOMEN PELVIS W CONTRAST  LOCATION: [facility]  DATE: 3/16/2024    INDICATION: hypoxia increased oxygen demand evaluate for PE. Sepsis  COMPARISON: CT abdomen and pelvis from 12/21/2023, chest CT from 02/08/2022  TECHNIQUE: CT chest pulmonary angiogram during arterial phase injection of IV contrast. CT of the abdomen and pelvis in the portal venous phase was also performed. Multiplanar reformats and MIP reconstructions were performed. Dose reduction techniques   were used.   CONTRAST: isovue 370 90ml    FINDINGS:  ANGIOGRAM CHEST: No pulmonary artery filling defects. The main pulmonary artery is dilated up to 4.4 cm. Thoracic aorta is negative for dissection.     LUNGS AND PLEURA: Mild mosaic attenuation of the lungs likely related to the exam being obtained in the expiratory phase of respiration. No effusions.    MEDIASTINUM/AXILLAE: Heart size is normal. Mitral annular calcification. There is a mildly enlarged right lower paratracheal lymph node measuring 2.1 x 1.5 cm on series 4 image 88.    CORONARY ARTERY CALCIFICATION: Mild.    LOWER CHEST: Hepatic steatosis. Hepatomegaly. The liver is mildly dysmorphic.  Cholecystectomy.    HEPATOBILIARY: Normal.    PANCREAS: Normal.    SPLEEN: Normal.    ADRENAL GLANDS: Normal.    KIDNEYS/BLADDER: Punctate nonobstructing right mid to lower pole renal calculus. No hydronephrosis. The bladder is unremarkable.    BOWEL: Diverticulosis of the colon. No acute inflammatory change. No obstruction. Mild colonic stool burden. Normal appendix.    LYMPH NODES: Again seen are a few borderline enlarged retroperitoneal, iliac, and inguinal lymph nodes.    VASCULATURE: Recanalized periumbilical vein. Mild atherosclerosis.    PELVIC ORGANS: Large 9 cm mobile ovarian dermoid in the right lower quadrant pelvis arising from the left adnexa/ovary.    MUSCULOSKELETAL: Degenerative changes of the spine. Diffuse anasarca. Numerous prominent abdominal and chest wall collaterals/varices.      Impression    IMPRESSION:   Chest:  1.  No pulmonary embolus.  2.  The main pulmonary artery is dilated up to 4.4 cm suggestive of pulmonary hypertension.  3.  Mild mosaic attenuation of the lungs likely related to the exam being obtained in the expiratory phase of respiration. Other considerations include small airways disease and/or air trapping.  4.  Mildly enlarged right lower paratracheal lymph node may be reactive.    Abdomen and pelvis:  1.  No acute intrapelvic findings.  2.  Punctate nonobstructing right renal calculus.  3.  Hepatic steatosis and hepatomegaly.  4.  The liver is dysmorphic suggestive of fibrosis and/or early cirrhosis. Additionally, there is recanalization of the paraumbilical vein suggestive of portal hypertension.  5.  Similar borderline enlarged retroperitoneal, iliac, and inguinal lymph nodes.  6.  Again seen is the large 9 cm mobile ovarian dermoid in the right lower quadrant pelvis arising from the left adnexa.     CT Abdomen Pelvis w Contrast    Narrative    EXAM: CT CHEST PULMONARY EMBOLISM W CONTRAST, CT ABDOMEN PELVIS W CONTRAST  LOCATION: [facility]  DATE:  3/16/2024    INDICATION: hypoxia increased oxygen demand evaluate for PE. Sepsis  COMPARISON: CT abdomen and pelvis from 12/21/2023, chest CT from 02/08/2022  TECHNIQUE: CT chest pulmonary angiogram during arterial phase injection of IV contrast. CT of the abdomen and pelvis in the portal venous phase was also performed. Multiplanar reformats and MIP reconstructions were performed. Dose reduction techniques   were used.   CONTRAST: isovue 370 90ml    FINDINGS:  ANGIOGRAM CHEST: No pulmonary artery filling defects. The main pulmonary artery is dilated up to 4.4 cm. Thoracic aorta is negative for dissection.     LUNGS AND PLEURA: Mild mosaic attenuation of the lungs likely related to the exam being obtained in the expiratory phase of respiration. No effusions.    MEDIASTINUM/AXILLAE: Heart size is normal. Mitral annular calcification. There is a mildly enlarged right lower paratracheal lymph node measuring 2.1 x 1.5 cm on series 4 image 88.    CORONARY ARTERY CALCIFICATION: Mild.    LOWER CHEST: Hepatic steatosis. Hepatomegaly. The liver is mildly dysmorphic. Cholecystectomy.    HEPATOBILIARY: Normal.    PANCREAS: Normal.    SPLEEN: Normal.    ADRENAL GLANDS: Normal.    KIDNEYS/BLADDER: Punctate nonobstructing right mid to lower pole renal calculus. No hydronephrosis. The bladder is unremarkable.    BOWEL: Diverticulosis of the colon. No acute inflammatory change. No obstruction. Mild colonic stool burden. Normal appendix.    LYMPH NODES: Again seen are a few borderline enlarged retroperitoneal, iliac, and inguinal lymph nodes.    VASCULATURE: Recanalized periumbilical vein. Mild atherosclerosis.    PELVIC ORGANS: Large 9 cm mobile ovarian dermoid in the right lower quadrant pelvis arising from the left adnexa/ovary.    MUSCULOSKELETAL: Degenerative changes of the spine. Diffuse anasarca. Numerous prominent abdominal and chest wall collaterals/varices.      Impression    IMPRESSION:   Chest:  1.  No pulmonary  embolus.  2.  The main pulmonary artery is dilated up to 4.4 cm suggestive of pulmonary hypertension.  3.  Mild mosaic attenuation of the lungs likely related to the exam being obtained in the expiratory phase of respiration. Other considerations include small airways disease and/or air trapping.  4.  Mildly enlarged right lower paratracheal lymph node may be reactive.    Abdomen and pelvis:  1.  No acute intrapelvic findings.  2.  Punctate nonobstructing right renal calculus.  3.  Hepatic steatosis and hepatomegaly.  4.  The liver is dysmorphic suggestive of fibrosis and/or early cirrhosis. Additionally, there is recanalization of the paraumbilical vein suggestive of portal hypertension.  5.  Similar borderline enlarged retroperitoneal, iliac, and inguinal lymph nodes.  6.  Again seen is the large 9 cm mobile ovarian dermoid in the right lower quadrant pelvis arising from the left adnexa.     US Lower Extremity Venous Duplex Bilateral    Narrative    EXAM: US LOWER EXTREMITY VENOUS DUPLEX BILATERAL  LOCATION: [facility]  DATE: 3/16/2024    INDICATION: leg swelling  COMPARISON: None.  TECHNIQUE: Venous Duplex ultrasound of bilateral lower extremities with and without compression, augmentation and duplex. Color flow and spectral Doppler with waveform analysis performed.    FINDINGS: Exam includes the common femoral, femoral, popliteal veins as well as segmentally visualized deep calf veins and greater saphenous vein.     RIGHT: No deep vein thrombosis. No superficial thrombophlebitis. No popliteal cyst.    LEFT: No deep vein thrombosis. No superficial thrombophlebitis. No popliteal cyst.      Impression    IMPRESSION:  1.  No deep venous thrombosis in the bilateral lower extremities.    2.  According to technologist notes there was some limitations in detail on this study secondary to patient's body habitus.   Echocardiogram Complete     Value    LVEF  > 65%    Narrative     [id number]  [id number]  [id number]  [id number]^JOHN^AMARILIS^[place] [address]     Name: CITLALY MICHEL  MRN: [id number]  : 1975  Study Date: 2024 11:47 AM  Age: 48 yrs  Gender: Female  Patient Location: [facility]  Reason For Study: CHF  Ordering Physician: AMARILIS LOPEZ  Performed By: ARIANNE     BSA: 2.4 m2  Height: 63 in  Weight: 329 lb  HR: 89  BP: 137/88 mmHg  ______________________________________________________________________________  Procedure  Complete Portable Echo Adult. Definity (NDC #04849-052) given intravenously.  ______________________________________________________________________________  Interpretation Summary     Left ventricular function is normal.The ejection fraction is > 65%.  The right ventricle is moderate to severely dilated.  TAPSE is abnormal, which is consistent with abnormal right ventricular  systolic function.  The left atrium is moderately dilated.  The right atrium is moderate to severely dilated.  There is moderate (2+) tricuspid regurgitation.  Severe (>55mmHg) pulmonary hypertension is present.  IVC diameter >2.1 cm collapsing <50% with sniff suggests a high RA pressure  estimated at 15 mmHg or greater.  ______________________________________________________________________________  Left Ventricle  The left ventricle is normal in size. Left ventricular function is normal.The  ejection fraction is > 65%. There is normal left ventricular wall thickness.  Left ventricular diastolic function is not assessable. Flattened septum is  consistent with RV pressure/volume overload.     Right Ventricle  The right ventricle is moderate to severely dilated. TAPSE is abnormal, which  is consistent with abnormal right ventricular systolic function.     Atria  The left atrium is moderately dilated. The right atrium is moderate to  severely dilated. There is no color Doppler evidence of an atrial shunt.     Mitral Valve  There is moderate mitral  annular calcification. There is mild mitral stenosis.     Tricuspid Valve  There is dilated tricuspid annulus. There is moderate (2+) tricuspid  regurgitation. The right ventricular systolic pressure is approximated at 69.6  mmHg plus the right atrial pressure. Severe (>55mmHg) pulmonary hypertension  is present.     Aortic Valve  There is mild trileaflet aortic sclerosis.     Pulmonic Valve  The pulmonic valve is not well visualized.     Vessels  Normal size ascending aorta. IVC diameter >2.1 cm collapsing <50% with sniff  suggests a high RA pressure estimated at 15 mmHg or greater.     Pericardium  There is no pericardial effusion.     ______________________________________________________________________________  MMode/2D Measurements & Calculations  Ao root diam: 3.2 cm  LA dimension: 5.4 cm  asc Aorta Diam: 3.5 cm  LA/Ao: 1.7  LVOT diam: 1.9 cm  LVOT area: 2.8 cm2     Ao root diam index Ht(cm/m): 2.0  Ao root diam index BSA (cm/m2): 1.3  Asc Ao diam index BSA (cm/m2): 1.5  Asc Ao diam index Ht(cm/m): 2.2  LA Volume Indexed (AL/bp): 46.2 ml/m2  RV Base: 5.4 cm  TAPSE: 1.4 cm     Time Measurements  Aortic HR: 93.0 BPM     MM HR: 96.0 BPM     Doppler Measurements & Calculations  MV E max jim: 80.7 cm/sec  MV A max jim: 139.0 cm/sec  MV E/A: 0.58  MV max PG: 10.3 mmHg  MV mean P.0 mmHg  MV V2 VTI: 28.8 cm  MVA(VTI): 2.2 cm2  MV P1/2t max jim: 110.0 cm/sec  MV P1/2t: 143.2 msec  MVA(P1/2t): 1.5 cm2  MV dec slope: 225.0 cm/sec2  MV dec time: 0.42 sec  Ao V2 max: 168.0 cm/sec  Ao max P.0 mmHg  Ao V2 mean: 125.0 cm/sec  Ao mean P.0 mmHg  Ao V2 VTI: 27.0 cm  SHANELL(I,D): 2.3 cm2  SHANELL(V,D): 1.9 cm2  LV V1 max P.2 mmHg  LV V1 max: 114.0 cm/sec  LV V1 VTI: 22.1 cm  CO(LVOT): 5.8 l/min  CI(LVOT): 2.4 l/min/m2  SV(LVOT): 62.7 ml  SI(LVOT): 26.2 ml/m2  PA acc time: 0.07 sec  TR max jim: 417.0 cm/sec  TR max P.6 mmHg  AV Jim Ratio (DI): 0.68  SHANELL Index (cm2/m2): 0.97  E/E': 15.2  E/E' av.2  Lateral  E/e': 19.0     Medial E/e': 15.3  Peak E' Jim: 5.3 cm/sec  RV S Jim: 11.3 cm/sec     ______________________________________________________________________________  Report approved by: Lian Angeles 03/17/2024 01:37 PM             Discharge Medications   Current Discharge Medication List        START taking these medications    Details   ceFAZolin (ANCEF) intermittent infusion 2 g in 100 mL dextrose PRE-MIX Inject 100 mLs (2 g) into the vein every 8 hours Until 3/30 weekly cbc crp lft creat  Qty: 4200 mL, Refills: 0    Associated Diagnoses: Cellulitis of lower extremity, unspecified laterality; Obesity hypoventilation syndrome (H); Acute on chronic respiratory failure with hypoxia and hypercapnia (H); Cellulitis of right leg           CONTINUE these medications which have NOT CHANGED    Details   acetaminophen (TYLENOL) 650 MG CR tablet Take 650 mg by mouth every 8 hours as needed for mild pain or fever      aspirin (ASPIRIN LOW DOSE) 81 MG EC tablet Take 1 tablet (81 mg) by mouth daily    Associated Diagnoses: Chronic heart failure with preserved ejection fraction (H)      bumetanide (BUMEX) 1 MG tablet Take 3 tablets (3 mg) by mouth daily  Qty: 270 tablet, Refills: 3    Associated Diagnoses: Chronic diastolic congestive heart failure (H)      metoprolol succinate ER (TOPROL XL) 25 MG 24 hr tablet Take 1 tablet (25 mg) by mouth daily  Qty: 90 tablet, Refills: 3    Associated Diagnoses: Benign essential hypertension; Sinus tachycardia      miconazole (MICATIN) 2 % AERP powder Apply topically 2 times daily as needed      sertraline (ZOLOFT) 100 MG tablet Take 1 tablet (100 mg) by mouth daily  Qty: 90 tablet, Refills: 3    Associated Diagnoses: Moderate episode of recurrent major depressive disorder (H); Generalized anxiety disorder           Allergies   Allergies   Allergen Reactions    Nkda [No Known Drug Allergy]

## 2024-03-20 NOTE — PLAN OF CARE
Goal Outcome Evaluation:      Plan of Care Reviewed With: patient    Overall Patient Progress: improvingOverall Patient Progress: improving     Pt. Alert, oriented. Denied pain, nausea, dyspnea, or lightheadedness. VSS, continue to monitor.

## 2024-03-20 NOTE — PROGRESS NOTES
[facility]    Referral received from ALFRED Toussaint,  for IV antibiotics.    Benefits verified. Pt has all Medicare products, which do not cover IV antibiotics in the home.  Patient would be responsible for the copay and the supplies.  The drug would be billed to the Part D plan.  The self pay estimate is $30.33/day for the Cefazolin and supplies.    ADDENDUM:  Pt's discharging IV abx changed from Ancef to Vancomycin.  The self pay quote has changed to $39.86/day.  Pt agrees to this new out of pocket cost.    The patient would need to be homebound in order for the home nurse visits to be covered under Medicare.  If not homebound, [facility] could provide nurse visits at $90/visit. The patient may have coverage in a TCU or infusion center.  Pt is currently opened to [facility] and they will resume their home care services at discharge (SN, PT and OT).    Writer will speak with pt to review benefits, home infusion and to offer choice of agency/home infusion provider.    Thank you for the referral.    Keesha Haney RN, BSN  [facility] Liaison  [phone] (Mon through Fri, 8:00 am-5:00 pm)  [phone] (Office)    ADDENDUM:  Writer spoke with pt to review benefits, home infusion and to offer choice of agency/home infusion provider.  Pt would like to go with [facility] for home infusion needs and will continue with [facility] for her home care services.  Pt has agreed to the self pay costs for the Ancef.  Once the midline is placed today, writer will return to do the teach for home IV admin.  Updated Kendal Love RN CM.    ADDENDUM:  [facility]UC[facility]    Met with patient at bedside for teach of IV antibiotic via SL valved PICC.  A PICC was placed in place of a Midline because pt is now discharging on Vancomycin 1500 mg PO q 12 hours.  Pt is receiving dose of Vanco this afternoon (2g) and she will next dose tomorrow morning at home at 9 am, per [facility] pharmacist.  Pt verb understanding.    Provided  hands-on teaching using teaching mat and demo equipment.  Patient taught SAS method and she was able to provide return demonstration using aseptic technique.  Extension tubing was added to her PICC.  IV catheter flushed without resistance and had good blood return.  Delivery of med and supplies will be delivered to patient's home tonight between 7-9 pm.  A nurse  will contact patient to set up the first home nursing visit.    Provided 24/7 phone number and answered all questions.  Patient verbalized understanding of discharge plans. Updates to/from pt's bedside RN, Sai, and Kendal Love RN CM.  Thank you for the opportunity to provide infusion services to this patient.

## 2024-03-21 ENCOUNTER — TELEPHONE (OUTPATIENT)
Dept: FAMILY MEDICINE | Facility: CLINIC | Age: 49
End: 2024-03-21
Payer: MEDICARE

## 2024-03-21 ENCOUNTER — PATIENT OUTREACH (OUTPATIENT)
Dept: CARE COORDINATION | Facility: CLINIC | Age: 49
End: 2024-03-21
Payer: MEDICARE

## 2024-03-21 SDOH — HEALTH STABILITY: PHYSICAL HEALTH: ON AVERAGE, HOW MANY MINUTES DO YOU ENGAGE IN EXERCISE AT THIS LEVEL?: 0 MIN

## 2024-03-21 SDOH — HEALTH STABILITY: PHYSICAL HEALTH: ON AVERAGE, HOW MANY DAYS PER WEEK DO YOU ENGAGE IN MODERATE TO STRENUOUS EXERCISE (LIKE A BRISK WALK)?: 0 DAYS

## 2024-03-21 ASSESSMENT — ACTIVITIES OF DAILY LIVING (ADL): DEPENDENT_IADLS:: INDEPENDENT;TRANSPORTATION

## 2024-03-21 NOTE — TELEPHONE ENCOUNTER
Copied from AVS:    Continue IV vancomycin for the next 9 days per ID recommendation (to  be completed 3/30/24). Check weekly BMP, CBC and LFT. Check  vancomycin level on 3/22/2024.    _________________________________________________________    CBC, BMP, LFT not due until 3/24  Vanco needed tomorrow.     All pended with dates    Marce Wilcox RN on 3/21/2024 at 11:49 AM

## 2024-03-21 NOTE — TELEPHONE ENCOUNTER
Contacted patient. Left voice mail to call back. Did she plan on clinic lab appointment 3/22?    Spoke with Sandra at [facility].   Patient has [facility] infusion services and started today at 9 am.   They will infuse and draw Vanco level this Saturday 3/23.   Managed by Dr. Alyssa Call.     Spoke with Bess  Confirms the above information except home care is drawing a Vanco level and bringing vial to CL lab    Spoke with Doris in lab and appointment not needed 3/22. Cancelled.     Marce Wilcox RN on 3/21/2024 at 12:37 PM

## 2024-03-21 NOTE — TELEPHONE ENCOUNTER
Leslie from [facility] calling for resumption of home care orders   Pt was in [facility] from 3/16/-3/20    Orders for skilled nursing visits 2x a week on week 4 and then 1x a week for weeks 4-9   PT and OT eval and treat   Pt given verbal orders per protocol     DEBRA Campbell will be done on the 30th  Peggy Mae RN on 3/21/2024 at 4:13 PM

## 2024-03-21 NOTE — COMMUNITY RESOURCES LIST (ENGLISH)
March 21, 2024           YOUR PERSONALIZED LIST OF SERVICES & PROGRAMS           & RECREATION    Sports      of [place] - Sports clubs and recreational activities  [address] [place] [address] (Distance: 13.4 miles)  Phone: [phone]) [phone]  Website: [email or web address]  Language: English  Fee: Self pay      of [place] - Sports clubs and recreational activities - [employer] [facility]  [address] [place] [address] (Distance: 1.9 miles)  Language: English  Fee: Self pay, Sliding scale      [facility] - Adult Enrichment  Phone: [phone]  Website: [email or web address]/adults-seniors/adult-enrichment/  Language: English  Hours: Mon 7:30 AM - 4:00 PM Tue 7:30 AM - 4:00 PM Wed 7:30 AM - 4:00 PM Thu 7:30 AM - 4:00 PM Fri 7:30 AM - 4:00 PM    Classes/Groups      [facility] fitness classes  [address] [place] [id number] (Distance: 1.1 miles)  Phone: [phone] [phone]  Website: http://[employer]/  Language: English  Fee: Self pay  Accessibility: Ada accessible      of the [facility] fitness classes - [place] of [facility]  [address] [place] [address] (Distance: 1.9 miles)  Language: English  Fee: Free, Self pay, Sliding scale      [employer] Players - [facility]  Phone: ([phone]) [phone]  Email: [email or web address]  Website: [email or web address]  Language: English  Hours: Mon 9:00 AM - 6:00 PM Tue 9:00 AM - 6:00 PM Wed 9:00 AM - 6:00 PM Thu 9:00 AM - 6:00 PM Fri 9:00 AM - 6:00 PM  Fee: Free               IMPORTANT NUMBERS & WEBSITES        Emergency Services  911  .   United Way  211 http://211unitedway.org  .   Poison Control  (984) 875-5377 http://mnpoison.org http://wisconsinpoison.org  .     Suicide and Crisis Lifeline  988 http://988lifeline.org  .   Childhelp [place] Child Abuse Hotline  458.133.5208 http://Childhelphotline.org   .    National Sexual Assault Hotline  (360) 969-2752 (HOPE) http://M2Gn.CME   .     National Runaway Safeline  (455) 562-4566 (RUNAWAY) http://MedArkive.CME  .   Pregnancy & Postpartum Support  Call/text 711-397-9767  [place]: http://ppsupportmn.org  [place]: http://psichapters.com/wi  .   Substance Abuse National Helpline ([facility])  209-810-HELP (9402) http://Findtreatment.gov   .                DISCLAIMER: [employer] does not endorse any service providers mentioned in this resource list. [employer] does not guarantee that the services mentioned in this resource list will be available to you or will improve your health or wellness.    [facility]

## 2024-03-21 NOTE — PROGRESS NOTES
Clinic Care Coordination Contact  Clinic Care Coordination Contact  OUTREACH    Referral Information:  Referral Source: IP Handoff    Primary Diagnosis: Other (include Comment box) (Left lower leg cellulitis)    Chief Complaint   Patient presents with    Clinic Care Coordination - Post Hospital     Clinic Care Coordination RN         Universal Utilization:   [facility]  Hospitalist Discharge Summary       Date of Admission:  3/16/2024  Date of Discharge:  3/20/2024  Discharging Provider: Maricruz Duncan MD  Discharge Service: Hospitalist Service        Discharge Diagnoses  Left lower extremity cellulitis  Sepsis  Chronic lymphedema  Rectal bleed  Obesity hypoventilation syndrome  Obstructive sleep apnea  Clinic Utilization  Difficulty keeping appointments:: No  Compliance Concerns: No  No-Show Concerns: No  No PCP office visit in Past Year: No  Utilization      No Show Count (past year)  0             ED Visits  2             Hospital Admissions  2                    Current as of: 3/21/2024  8:35 AM                Clinical Concerns:  Current Medical Concerns:  Patient reports she plans to call [facility] today and inform them she needs lab work tomorrow .  Patient is able to wrap her own lymphedema wraps from the knee down and also has a machine to help her circulation.      Patient uses oxygen 1 liter during the day and 2 liters at night .   Uses the CPAP and Incentive Spirometer as directed      Current Behavioral Concerns: No    Education Provided to patient: CC role introduced    Pain  Pain (GOAL):: No  Health Maintenance Reviewed: Not assessed  Clinical Pathway: None    Medication Management:  Medication review status: Medications reviewed.  Changes noted per patient report.       Functional Status:  Dependent ADLs:: Ambulation-walker, Bathing  Dependent IADLs:: Independent, Transportation  Mobility Status: Independent w/Device    Living Situation:  Current living  arrangement:: I live alone, I live in a private home    Lifestyle & Psychosocial Needs:    Social Determinants of Health     Food Insecurity: Low Risk  (3/21/2024)    Food Insecurity     Within the past 12 months, did you worry that your food would run out before you got money to buy more?: No     Within the past 12 months, did the food you bought just not last and you didn t have money to get more?: No   Depression: Not at risk (2/7/2024)    PHQ-2     PHQ-2 Score: 2   Housing Stability: Low Risk  (3/21/2024)    Housing Stability     Do you have housing? : Yes     Are you worried about losing your housing?: No   Tobacco Use: Low Risk  (2/7/2024)    Patient History     Smoking Tobacco Use: Never     Smokeless Tobacco Use: Never     Passive Exposure: Not on file   Financial Resource Strain: Low Risk  (9/25/2023)    Financial Resource Strain     Within the past 12 months, have you or your family members you live with been unable to get utilities (heat, electricity) when it was really needed?: No   Alcohol Use: Not on file   Transportation Needs: Low Risk  (3/21/2024)    Transportation Needs     Within the past 12 months, has lack of transportation kept you from medical appointments, getting your medicines, non-medical meetings or appointments, work, or from getting things that you need?: No   Physical Activity: Inactive (3/21/2024)    Exercise Vital Sign     Days of Exercise per Week: 0 days     Minutes of Exercise per Session: 0 min   Interpersonal Safety: Low Risk  (9/27/2023)    Interpersonal Safety     Do you feel physically and emotionally safe where you currently live?: Yes     Within the past 12 months, have you been hit, slapped, kicked or otherwise physically hurt by someone?: No     Within the past 12 months, have you been humiliated or emotionally abused in other ways by your partner or ex-partner?: No   Stress: No Stress Concern Present (12/4/2020)    [place] citizen [place] of Occupational Health - Occupational  Stress Questionnaire     Feeling of Stress : Only a little   Social Connections: Unknown (3/1/2022)    Social Connection and Isolation Panel [NHANES]     Frequency of Communication with Friends and Family: Twice a week     Frequency of Social Gatherings with Friends and Family: Twice a week     Attends Episcopal Services: Not on file     Active Member of Clubs or Organizations: Not on file     Attends Club or Organization Meetings: Not on file     Marital Status: Not on file     Inadequate nutrition (GOAL):: No  Tube Feeding: No  Inadequate activity/exercise (GOAL):: No  Significant changes in sleep pattern (GOAL): No  Transportation means:: Family     Episcopal or spiritual beliefs that impact treatment:: No  Mental health DX:: Yes  Mental health DX how managed:: Medication  Mental health management concern (GOAL):: No  Chemical Dependency Status: No Current Concerns  Informal Support system:: Friends, Family             Resources and Interventions:  Current Resources:   Skilled Home Care Services: Skilled Nursing, Home Health Aid, Physical Therapy ([facility])  Community Resources: Home Care  Supplies Currently Used at Home: Incontinence Supplies, Oxygen Tubing/Supplies (CPAP)  Equipment Currently Used at Home: grab bar, tub/shower, shower chair, walker, rolling, wheelchair, manual, commode chair, other (see comments) (home O2, CPAP)  Employment Status: disabled         Advance Care Plan/Directive  Advanced Care Plans/Directives on file:: No    Referrals Placed: None      Patient/Caregiver understanding: Expresses good understanding of discharge instructions        Future Appointments                Tomorrow CL LAB [facility], [facility]    In 1 week Mikala Luna MD [facility], [facility]    In 2 weeks Agnieszka Diaz MD [facility] Heart [facility] PSA CLIN            Plan:   Patient will keep hospital follow up scheduled with PCP  4/2/2024  Patient will continue [facility] services    No unmet needs, no further care coordination is needed at this time     [facility]   Meenakshi Campbell RN, Care Coordinator   [facility]   E-mail [email or web address]   [phone]

## 2024-03-22 LAB
BACTERIA BLD CULT: NO GROWTH
BACTERIA BLD CULT: NO GROWTH

## 2024-03-23 ENCOUNTER — LAB REQUISITION (OUTPATIENT)
Dept: LAB | Facility: CLINIC | Age: 49
End: 2024-03-23
Payer: MEDICARE

## 2024-03-23 DIAGNOSIS — L03.116 CELLULITIS OF LEFT LOWER LIMB: ICD-10-CM

## 2024-03-23 DIAGNOSIS — R78.81 BACTEREMIA: Primary | ICD-10-CM

## 2024-03-23 LAB
CK SERPL-CCNC: 41 U/L (ref 26–192)
CREAT SERPL-MCNC: 0.69 MG/DL (ref 0.51–0.95)
EGFRCR SERPLBLD CKD-EPI 2021: >90 ML/MIN/1.73M2
VANCOMYCIN SERPL-MCNC: 17.8 UG/ML

## 2024-03-23 PROCEDURE — 80202 ASSAY OF VANCOMYCIN: CPT | Mod: ORL | Performed by: FAMILY MEDICINE

## 2024-03-23 PROCEDURE — 82565 ASSAY OF CREATININE: CPT | Mod: ORL | Performed by: FAMILY MEDICINE

## 2024-03-24 LAB
BACTERIA BLD CULT: NO GROWTH
BACTERIA BLD CULT: NO GROWTH

## 2024-03-25 ENCOUNTER — TELEPHONE (OUTPATIENT)
Dept: FAMILY MEDICINE | Facility: CLINIC | Age: 49
End: 2024-03-25

## 2024-03-25 ENCOUNTER — LAB REQUISITION (OUTPATIENT)
Dept: LAB | Facility: CLINIC | Age: 49
End: 2024-03-25
Payer: MEDICARE

## 2024-03-25 DIAGNOSIS — L03.90 CELLULITIS, UNSPECIFIED: ICD-10-CM

## 2024-03-25 DIAGNOSIS — A41.9 SEPSIS, UNSPECIFIED ORGANISM (H): ICD-10-CM

## 2024-03-25 LAB
ALBUMIN SERPL BCG-MCNC: 3.7 G/DL (ref 3.5–5.2)
ALP SERPL-CCNC: 65 U/L (ref 40–150)
ALT SERPL W P-5'-P-CCNC: 10 U/L (ref 0–50)
AST SERPL W P-5'-P-CCNC: 25 U/L (ref 0–45)
BASOPHILS # BLD AUTO: 0.1 10E3/UL (ref 0–0.2)
BASOPHILS NFR BLD AUTO: 2 %
BILIRUB DIRECT SERPL-MCNC: 0.48 MG/DL (ref 0–0.3)
BILIRUB SERPL-MCNC: 1.9 MG/DL
CREAT SERPL-MCNC: 0.72 MG/DL (ref 0.51–0.95)
EGFRCR SERPLBLD CKD-EPI 2021: >90 ML/MIN/1.73M2
EOSINOPHIL # BLD AUTO: 0.2 10E3/UL (ref 0–0.7)
EOSINOPHIL NFR BLD AUTO: 4 %
ERYTHROCYTE [DISTWIDTH] IN BLOOD BY AUTOMATED COUNT: 18.4 % (ref 10–15)
HCT VFR BLD AUTO: 45.7 % (ref 35–47)
HGB BLD-MCNC: 14.8 G/DL (ref 11.7–15.7)
IMM GRANULOCYTES # BLD: 0 10E3/UL
IMM GRANULOCYTES NFR BLD: 0 %
LYMPHOCYTES # BLD AUTO: 1.1 10E3/UL (ref 0.8–5.3)
LYMPHOCYTES NFR BLD AUTO: 25 %
MCH RBC QN AUTO: 27.5 PG (ref 26.5–33)
MCHC RBC AUTO-ENTMCNC: 32.4 G/DL (ref 31.5–36.5)
MCV RBC AUTO: 85 FL (ref 78–100)
MONOCYTES # BLD AUTO: 0.5 10E3/UL (ref 0–1.3)
MONOCYTES NFR BLD AUTO: 11 %
NEUTROPHILS # BLD AUTO: 2.7 10E3/UL (ref 1.6–8.3)
NEUTROPHILS NFR BLD AUTO: 59 %
NRBC # BLD AUTO: 0 10E3/UL
NRBC BLD AUTO-RTO: 0 /100
PLATELET # BLD AUTO: 203 10E3/UL (ref 150–450)
PROT SERPL-MCNC: 8.2 G/DL (ref 6.4–8.3)
RBC # BLD AUTO: 5.38 10E6/UL (ref 3.8–5.2)
VANCOMYCIN SERPL-MCNC: 22.7 UG/ML
WBC # BLD AUTO: 4.6 10E3/UL (ref 4–11)

## 2024-03-25 PROCEDURE — 80076 HEPATIC FUNCTION PANEL: CPT | Performed by: INTERNAL MEDICINE

## 2024-03-25 PROCEDURE — 82565 ASSAY OF CREATININE: CPT | Performed by: INTERNAL MEDICINE

## 2024-03-25 PROCEDURE — 85025 COMPLETE CBC W/AUTO DIFF WBC: CPT | Performed by: INTERNAL MEDICINE

## 2024-03-25 PROCEDURE — 80202 ASSAY OF VANCOMYCIN: CPT | Performed by: INTERNAL MEDICINE

## 2024-03-25 NOTE — TELEPHONE ENCOUNTER
Home Care is calling regarding an established patient with [facility].       Requesting orders from: Mikala Luna  Provider is following patient: Yes  Is this a 60-day recertification request?  No    Orders Requested    Occupational Therapy  Request for initial evaluation and treatment (one time)     Occupational Therapy  Request for initial evaluation and treatment (one time)    1 x a week for 4 weeks    Verbal orders given.  Home Care will send orders for provider to sign.  Confirmed ok to leave a detailed message with call back.  Contact information confirmed and updated as needed.    Peggy Mae RN

## 2024-03-26 ENCOUNTER — TELEPHONE (OUTPATIENT)
Dept: FAMILY MEDICINE | Facility: CLINIC | Age: 49
End: 2024-03-26
Payer: MEDICARE

## 2024-03-26 NOTE — TELEPHONE ENCOUNTER
Home Care is calling regarding an established patient with [facility].       Requesting orders from: Mikala Luna  Provider is following patient: Yes  Is this a 60-day recertification request?  No    Orders Requested    Physical Therapy  Request for initial certification (first set of orders)   Frequency:  1x/wk for 4 wks      Information was gathered and will be sent to provider for review.  RN will contact Home Care with information after provider review.  Confirmed ok to leave a detailed message with call back.  Contact information confirmed and updated as needed.    Peggy Mae RN

## 2024-03-27 NOTE — TELEPHONE ENCOUNTER
Left message for Denice with orders requested below.     Marce Wilcox RN on 3/27/2024 at 8:14 AM

## 2024-03-28 ENCOUNTER — TELEPHONE (OUTPATIENT)
Dept: INFECTIOUS DISEASES | Facility: CLINIC | Age: 49
End: 2024-03-28
Payer: MEDICARE

## 2024-03-28 NOTE — TELEPHONE ENCOUNTER
HI informed that the final end date for IV abx is 3/30 and the PICC can be removed after the last dose.

## 2024-04-04 ENCOUNTER — OFFICE VISIT (OUTPATIENT)
Dept: CARDIOLOGY | Facility: CLINIC | Age: 49
End: 2024-04-04
Attending: FAMILY MEDICINE
Payer: MEDICARE

## 2024-04-04 ENCOUNTER — PATIENT OUTREACH (OUTPATIENT)
Dept: CARDIOLOGY | Facility: CLINIC | Age: 49
End: 2024-04-04

## 2024-04-04 VITALS
HEIGHT: 64 IN | WEIGHT: 293 LBS | DIASTOLIC BLOOD PRESSURE: 84 MMHG | OXYGEN SATURATION: 98 % | SYSTOLIC BLOOD PRESSURE: 118 MMHG | HEART RATE: 83 BPM | BODY MASS INDEX: 50.02 KG/M2 | RESPIRATION RATE: 16 BRPM

## 2024-04-04 DIAGNOSIS — I50.32 CHRONIC HEART FAILURE WITH PRESERVED EJECTION FRACTION (H): ICD-10-CM

## 2024-04-04 PROCEDURE — 99215 OFFICE O/P EST HI 40 MIN: CPT | Performed by: INTERNAL MEDICINE

## 2024-04-04 RX ORDER — POTASSIUM CHLORIDE 1500 MG/1
20 TABLET, EXTENDED RELEASE ORAL 2 TIMES DAILY
Qty: 90 TABLET | Refills: 3 | Status: SHIPPED | OUTPATIENT
Start: 2024-04-04 | End: 2024-04-04

## 2024-04-04 RX ORDER — POTASSIUM CHLORIDE 1500 MG/1
20 TABLET, EXTENDED RELEASE ORAL 2 TIMES DAILY
Qty: 180 TABLET | Refills: 3 | Status: ON HOLD | OUTPATIENT
Start: 2024-04-04 | End: 2024-07-23

## 2024-04-04 RX ORDER — BUMETANIDE 2 MG/1
4 TABLET ORAL 2 TIMES DAILY
Qty: 120 TABLET | Refills: 2 | Status: SHIPPED | OUTPATIENT
Start: 2024-04-04

## 2024-04-04 RX ORDER — FUROSEMIDE INJECTION 80 MG/ 10 ML 8 MG/ML
80 INJECTION SUBCUTANEOUS DAILY
Status: SHIPPED
Start: 2024-04-04 | End: 2024-04-17 | Stop reason: ALTCHOICE

## 2024-04-04 NOTE — PROGRESS NOTES
[employer] and supporting documents faxed to [employer] at [phone] to get cost coverage.       Tahmina Duran RN BAN   4:57 PM 4/4/2024    CORE nurse line M-F 8a-4p: [phone]

## 2024-04-04 NOTE — PROGRESS NOTES
CARDIOLOGY CLINIC CONSULTATION    PRIMARY CARE PHYSICIAN:  Mikala Luna    HISTORY OF PRESENT ILLNESS:  This is a very pleasant 48-year-old female who has been referred to my clinic at [facility] for severe RV failure and cor pulmonale. She used to see [facility] and Dr. Sanchez last in 2022 for RV failure.    The patient has a history of morbid obesity with BMI around 56.  She has been using wheelchair assistance for the last many years.  [place].  Denies any other cardiac history.  She has sleep apnea and is compliant with her CPAP use.  She has been on Bumex for many years.  She says she has been told that she has lymphedema in addition to RV failure.  She has a history of PE in the past    Over the years, she has had progressive RV dilatation and dysfunction on her echocardiogram.  Her PA pressures are close to 70+ right atrial pressures which are also estimated to be very high based on IVC size.  Her LV function is hyperdynamic.  She has mild mitral stenosis.    Patient is seen in cardiology clinic today as a new HF visit.  She has some degree of orthopnea but mainly severe symptoms of right-sided congestive heart failure.  She is currently on 3 mg of Bumex.      She also tells me that she has an eating disorder and consumes excessive amount of calories and food.    Denies angina syncope presyncope.  Poor functional status.    PAST MEDICAL HISTORY:  Past Medical History:   Diagnosis Date    Acute on chronic diastolic congestive heart failure (H) 02/09/2022    Arthritis 2019    Hips    ASCUS favor benign 08/2015    Neg HPV    Depressive disorder 2010    H/O colposcopy with cervical biopsy 09/14/2015    Bx & ECC - negative    Hypertension 2019    LSIL on Pap smear 07/2014    Neg high risk HPV    Multiple sclerosis (H) 08/29/2005 9/18/200pt dx with MS 2004--dx by neurolgist and is followed by Dr. Harris    Myocardial infarction (H)     Obese     Pneumonia  due to infectious organism, unspecified laterality, unspecified part of lung 02/08/2022    Shingles 10/2016    SIRS (systemic inflammatory response syndrome) (H) 03/04/2021    Sleep apnea     UNSPEC CONSTIPATION 09/18/2006 9/18/2006   Has tried otc suppository and otc lax.        MEDICATIONS:  Current Outpatient Medications   Medication Sig Dispense Refill    acetaminophen (TYLENOL) 650 MG CR tablet Take 650 mg by mouth every 8 hours as needed for mild pain or fever      aspirin (ASPIRIN LOW DOSE) 81 MG EC tablet Take 1 tablet (81 mg) by mouth daily      bumetanide (BUMEX) 2 MG tablet Take 2 tablets (4 mg) by mouth 2 times daily 120 tablet 2    metoprolol succinate ER (TOPROL XL) 25 MG 24 hr tablet Take 1 tablet (25 mg) by mouth daily 90 tablet 3    miconazole (MICATIN) 2 % AERP powder Apply topically 2 times daily as needed      potassium chloride sheila ER (KLOR-CON M20) 20 MEQ CR tablet Take 1 tablet (20 mEq) by mouth 2 times daily 90 tablet 3    sertraline (ZOLOFT) 100 MG tablet Take 1 tablet (100 mg) by mouth daily 90 tablet 3     No current facility-administered medications for this visit.       SOCIAL HISTORY:  I have reviewed this patient's social history and updated it with pertinent information if needed. Bess DILIA Enamorado  reports that she has never smoked. She has never used smokeless tobacco. She reports current alcohol use. She reports that she does not use drugs.    PHYSICAL EXAM:  Pulse:  [83] 83  Resp:  [16] 16  BP: (118)/(84) 118/84  SpO2:  [98 %] 98 %  330 lbs 0 oz    Constitutional: alert, no distress  Respiratory: Good bilateral air entry  Cardiovascular: Regular heart sounds right-sided S3 3+ bilateral pitting edema JVP elevated all signs consistent with right-sided congestive heart failure  GI: Morbidly obese no clear evidence of ascites  Neuropsychiatric: appropriate affact    ASSESSMENT: Pertinent issues addressed/ reviewed during this cardiology visit  Severe chronic right-sided heart failure  cor pulmonale  Obstructive sleep apnea  Morbid obesity  Severe pulmonary hypertension likely type III and IV  Mild coronary calcifications on CT  Eating disorder  History of PE    RECOMMENDATIONS:  Although this patient has a diagnosis reported of lymphedema, I am concerned this patient has severe right-sided heart failure and critical pulmonary hypertension that has been chronic for many years.  She has severe right-sided congestion and renal congestion causing inability to achieve adequate diuresis. She is non compliant with diet and sodium intake.  Fortunately her breathing is quite stable however she is not functionally very active.  I believe this patient needs aggressive diuresis and if we fail to achieve that as an outpatient, she will need to be admitted to the hospital for IV infusion for a few days to shift the hemodynamic curve that she currently is on.  For now I recommend changing Bumex to 4 mg twice daily.  She will take that at 8 AM and 2 PM.  Take potassium 20 mEq twice daily along with that.  Labs in 2 weeks.  Counseled extensively on salt and fluid restriction.  Avoid excessive calories and high sodium diet.  Daily weights and blood pressure check at home.  I have sent a message out to patient's primary care team to strongly consider initiating this patient on weight loss drugs.  Otherwise this problem is likely to recur even if we are successful with diuresis.  Compliance with sleep apnea machine enforced.  Core education provided.    It was a pleasure seeing this patient in clinic today. Please do not hesitate to contact me with any future questions.  See me back in clinic in the next 1 to 2 months    YOLI Batron, [facility]  Cardiology - [facility] Heart  April 4, 2024    Review of the result(s) of each unique test - Last echocardiogram CBC BMP CT. Last ECG shows sinus rhythm.     The level of medical decision making during this visit was of high complexity. Condition for which patient was treated  that is considered severe and would require intensive monitoring or urgent treatment to facilitate care -severe decompensated right-sided heart failure    This note was completed in part using dictation via the Dragon voice recognition software. Some word and grammatical errors may occur and must be interpreted in the appropriate clinical context.  If there are any questions pertaining to this issue, please contact me for further clarification.

## 2024-04-04 NOTE — LETTER
4/4/2024    Mikala Luna MD  [id number]  [facility] [address]    RE: Bess Enamorado       Dear Colleague,     I had the pleasure of seeing Bess Enamorado in the [facility].  CARDIOLOGY CLINIC CONSULTATION    PRIMARY CARE PHYSICIAN:  Mikala Luna    HISTORY OF PRESENT ILLNESS:  This is a very pleasant 48-year-old female who has been referred to my clinic at [facility] for severe RV failure and cor pulmonale. She used to see [facility] and Dr. Sanchez last in 2022 for RV failure.    The patient has a history of morbid obesity with BMI around 56.  She has been using wheelchair assistance for the last many years.  She lives in a [facility] [place] to [place].  Denies any other cardiac history.  She has sleep apnea and is compliant with her CPAP use.  She has been on Bumex for many years.  She says she has been told that she has lymphedema in addition to RV failure.  She has a history of PE in the past    Over the years, she has had progressive RV dilatation and dysfunction on her echocardiogram.  Her PA pressures are close to 70+ right atrial pressures which are also estimated to be very high based on IVC size.  Her LV function is hyperdynamic.  She has mild mitral stenosis.    Patient is seen in cardiology clinic today as a new HF visit.  She has some degree of orthopnea but mainly severe symptoms of right-sided congestive heart failure.  She is currently on 3 mg of Bumex.      She also tells me that she has an eating disorder and consumes excessive amount of calories and food.    Denies angina syncope presyncope.  Poor functional status.    PAST MEDICAL HISTORY:  Past Medical History:   Diagnosis Date    Acute on chronic diastolic congestive heart failure (H) 02/09/2022    Arthritis 2019    Hips    ASCUS favor benign 08/2015    Neg HPV    Depressive disorder 2010    H/O colposcopy with cervical biopsy 09/14/2015    Bx & ECC - negative    Hypertension 2019    LSIL on Pap  smear 07/2014    Neg high risk HPV    Multiple sclerosis (H) 08/29/2005 9/18/200pt dx with MS 2004--dx by neurolgist and is followed by Dr. Harris    Myocardial infarction (H)     Obese     Pneumonia due to infectious organism, unspecified laterality, unspecified part of lung 02/08/2022    Shingles 10/2016    SIRS (systemic inflammatory response syndrome) (H) 03/04/2021    Sleep apnea     UNSPEC CONSTIPATION 09/18/2006 9/18/2006   Has tried otc suppository and otc lax.        MEDICATIONS:  Current Outpatient Medications   Medication Sig Dispense Refill    acetaminophen (TYLENOL) 650 MG CR tablet Take 650 mg by mouth every 8 hours as needed for mild pain or fever      aspirin (ASPIRIN LOW DOSE) 81 MG EC tablet Take 1 tablet (81 mg) by mouth daily      bumetanide (BUMEX) 2 MG tablet Take 2 tablets (4 mg) by mouth 2 times daily 120 tablet 2    metoprolol succinate ER (TOPROL XL) 25 MG 24 hr tablet Take 1 tablet (25 mg) by mouth daily 90 tablet 3    miconazole (MICATIN) 2 % AERP powder Apply topically 2 times daily as needed      potassium chloride sheila ER (KLOR-CON M20) 20 MEQ CR tablet Take 1 tablet (20 mEq) by mouth 2 times daily 90 tablet 3    sertraline (ZOLOFT) 100 MG tablet Take 1 tablet (100 mg) by mouth daily 90 tablet 3     No current facility-administered medications for this visit.       SOCIAL HISTORY:  I have reviewed this patient's social history and updated it with pertinent information if needed. Bess DILIA Enamorado  reports that she has never smoked. She has never used smokeless tobacco. She reports current alcohol use. She reports that she does not use drugs.    PHYSICAL EXAM:  Pulse:  [83] 83  Resp:  [16] 16  BP: (118)/(84) 118/84  SpO2:  [98 %] 98 %  330 lbs 0 oz    Constitutional: alert, no distress  Respiratory: Good bilateral air entry  Cardiovascular: Regular heart sounds right-sided S3 3+ bilateral pitting edema JVP elevated all signs consistent with right-sided congestive heart failure  GI:  Morbidly obese no clear evidence of ascites  Neuropsychiatric: appropriate affact    ASSESSMENT: Pertinent issues addressed/ reviewed during this cardiology visit  Severe chronic right-sided heart failure cor pulmonale  Obstructive sleep apnea  Morbid obesity  Severe pulmonary hypertension likely type III and IV  Mild coronary calcifications on CT  Eating disorder  History of PE    RECOMMENDATIONS:  Although this patient has a diagnosis reported of lymphedema, I am concerned this patient has severe right-sided heart failure and critical pulmonary hypertension that has been chronic for many years.  She has severe right-sided congestion and renal congestion causing inability to achieve adequate diuresis. She is non compliant with diet and sodium intake.  Fortunately her breathing is quite stable however she is not functionally very active.  I believe this patient needs aggressive diuresis and if we fail to achieve that as an outpatient, she will need to be admitted to the hospital for IV infusion for a few days to shift the hemodynamic curve that she currently is on.  For now I recommend changing Bumex to 4 mg twice daily.  She will take that at 8 AM and 2 PM.  Take potassium 20 mEq twice daily along with that.  Labs in 2 weeks.  Counseled extensively on salt and fluid restriction.  Avoid excessive calories and high sodium diet.  Daily weights and blood pressure check at home.  I have sent a message out to patient's primary care team to strongly consider initiating this patient on weight loss drugs.  Otherwise this problem is likely to recur even if we are successful with diuresis.  Compliance with sleep apnea machine enforced.  Core education provided.    It was a pleasure seeing this patient in clinic today. Please do not hesitate to contact me with any future questions.  See me back in clinic in the next 1 to 2 months    YOLI Barton, [facility]  Cardiology - [facility]  April 4, 2024    Review of the result(s) of  each unique test - Last echocardiogram CBC BMP CT. Last ECG shows sinus rhythm.     The level of medical decision making during this visit was of high complexity. Condition for which patient was treated that is considered severe and would require intensive monitoring or urgent treatment to facilitate care -severe decompensated right-sided heart failure    This note was completed in part using dictation via the Dragon voice recognition software. Some word and grammatical errors may occur and must be interpreted in the appropriate clinical context.  If there are any questions pertaining to this issue, please contact me for further clarification.    Thank you for allowing me to participate in the care of your patient.      Sincerely,     Agnieszka Diaz MD     [facility]  cc:   Mikala Luna MD  [address] [place] [address]

## 2024-04-08 ENCOUNTER — DOCUMENTATION ONLY (OUTPATIENT)
Dept: CARDIOLOGY | Facility: CLINIC | Age: 49
End: 2024-04-08
Payer: MEDICARE

## 2024-04-08 NOTE — PROGRESS NOTES
Dr Diaz out of the office until 4/15/24. Will discuss with him then. Follow up with Christin Holt NP on 5/14/24. Tomasa Whittaker RN Cardiology April 8, 2024, 11:02 AM

## 2024-04-08 NOTE — PROGRESS NOTES
"Received message from Sondra LARA:    \"4-8-2024 received a fax, Furo[employer]ix [id number]  For # 8 with 90 day supply. ID# [id number]  Approval # [id number]  Message to [place] CORE team-Sondra Duarte Lpn\"    Unclear what plan is for Furoscix administration, as not mentioned in Dr. Diaz's 4/4/24 clinic note. Will route to [place] cardiology team for further coordination of this medication.    RN team may call [employer] ([phone], fax: [phone]) to discuss dispensing of medication to Ms. Enamorado.    Patient education videos re: self-administration are available and can be sent to Ms. Enamorado via [employer] if team wishes her to proceed.    [email or web address]      Future Appointments   Date Time Provider Department Center   4/10/2024  9:20 AM Mikala Luna MD [facility]   5/14/2024  1:15 PM Christin Holt, APRN [facility] PSA CLIN         Yesy Salinas RN BSN   10:33 AM 04/08/24  Nurse line M-F 8a-4p: [phone]      "

## 2024-04-08 NOTE — PROGRESS NOTES
4-8-2024 received a fax, Furo[employer]ix [id number]  For # 8 with 90 day supply. ID# [id number]  Approval # [id number]  Message to [place] CORE team-Sondra Duarte Lpn

## 2024-04-10 ENCOUNTER — TELEPHONE (OUTPATIENT)
Dept: FAMILY MEDICINE | Facility: CLINIC | Age: 49
End: 2024-04-10

## 2024-04-10 ENCOUNTER — MEDICAL CORRESPONDENCE (OUTPATIENT)
Dept: HEALTH INFORMATION MANAGEMENT | Facility: CLINIC | Age: 49
End: 2024-04-10

## 2024-04-10 NOTE — TELEPHONE ENCOUNTER
Dr. Luna:    Received a call from MARCO Russo from [facility] (call back for Rafaela is [phone]).    Rafaela is reporting patient has lymphedema and a small blistered area broke open on the lower left leg measuring 0.5 x 0.1 x 0.1 cm. Area had scant amount of bloody drainage, otherwise no additional drainage, requesting the following wound care orders:    Cleanse with area with antibacterial cleanser, pat dry, apply Adaptic dressing, wrap with Kerlix one time per week and change as needed until healed.      Please advise verbal orders for wound care.      KINDRA Man

## 2024-04-15 ENCOUNTER — DOCUMENTATION ONLY (OUTPATIENT)
Dept: OTHER | Facility: CLINIC | Age: 49
End: 2024-04-15
Payer: MEDICARE

## 2024-04-15 NOTE — TELEPHONE ENCOUNTER
Subcutaneous Lasix can be used instead of metolazone if needed if she does not make good headway in the next couple of weeks or labs are abnormal.  Please alert with me with labs and see how she is feeling in the next 2 weeks.

## 2024-04-15 NOTE — PROGRESS NOTES
BMP to be done by home care nurse around ~4/18/24. Tomasa Whittaker RN Cardiology April 15, 2024, 3:02 PM

## 2024-04-17 ENCOUNTER — TELEPHONE (OUTPATIENT)
Dept: FAMILY MEDICINE | Facility: CLINIC | Age: 49
End: 2024-04-17

## 2024-04-17 ENCOUNTER — LAB (OUTPATIENT)
Dept: LAB | Facility: CLINIC | Age: 49
End: 2024-04-17
Payer: MEDICARE

## 2024-04-17 ENCOUNTER — OFFICE VISIT (OUTPATIENT)
Dept: FAMILY MEDICINE | Facility: CLINIC | Age: 49
End: 2024-04-17
Payer: MEDICARE

## 2024-04-17 VITALS
WEIGHT: 293 LBS | RESPIRATION RATE: 20 BRPM | HEART RATE: 88 BPM | OXYGEN SATURATION: 90 % | DIASTOLIC BLOOD PRESSURE: 74 MMHG | SYSTOLIC BLOOD PRESSURE: 100 MMHG | BODY MASS INDEX: 54.28 KG/M2 | TEMPERATURE: 97.9 F

## 2024-04-17 DIAGNOSIS — E66.813 CLASS 3 SEVERE OBESITY DUE TO EXCESS CALORIES WITH SERIOUS COMORBIDITY AND BODY MASS INDEX (BMI) OF 50.0 TO 59.9 IN ADULT (H): Primary | ICD-10-CM

## 2024-04-17 DIAGNOSIS — G47.33 OSA ON CPAP: ICD-10-CM

## 2024-04-17 DIAGNOSIS — L30.4 INTERTRIGO: ICD-10-CM

## 2024-04-17 DIAGNOSIS — I50.32 CHRONIC HEART FAILURE WITH PRESERVED EJECTION FRACTION (H): ICD-10-CM

## 2024-04-17 DIAGNOSIS — R78.81 BACTEREMIA: ICD-10-CM

## 2024-04-17 DIAGNOSIS — G35 MULTIPLE SCLEROSIS (H): Chronic | ICD-10-CM

## 2024-04-17 DIAGNOSIS — E66.01 CLASS 3 SEVERE OBESITY DUE TO EXCESS CALORIES WITH SERIOUS COMORBIDITY AND BODY MASS INDEX (BMI) OF 50.0 TO 59.9 IN ADULT (H): Primary | ICD-10-CM

## 2024-04-17 DIAGNOSIS — E66.2 OBESITY HYPOVENTILATION SYNDROME (H): ICD-10-CM

## 2024-04-17 LAB
ANION GAP SERPL CALCULATED.3IONS-SCNC: 9 MMOL/L (ref 7–15)
BUN SERPL-MCNC: 18.4 MG/DL (ref 6–20)
CALCIUM SERPL-MCNC: 9.9 MG/DL (ref 8.6–10)
CHLORIDE SERPL-SCNC: 100 MMOL/L (ref 98–107)
CREAT SERPL-MCNC: 0.72 MG/DL (ref 0.51–0.95)
DEPRECATED HCO3 PLAS-SCNC: 28 MMOL/L (ref 22–29)
EGFRCR SERPLBLD CKD-EPI 2021: >90 ML/MIN/1.73M2
GLUCOSE SERPL-MCNC: 87 MG/DL (ref 70–99)
POTASSIUM SERPL-SCNC: 4 MMOL/L (ref 3.4–5.3)
SODIUM SERPL-SCNC: 137 MMOL/L (ref 135–145)

## 2024-04-17 PROCEDURE — 99214 OFFICE O/P EST MOD 30 MIN: CPT | Performed by: FAMILY MEDICINE

## 2024-04-17 PROCEDURE — 36415 COLL VENOUS BLD VENIPUNCTURE: CPT

## 2024-04-17 PROCEDURE — 80048 BASIC METABOLIC PNL TOTAL CA: CPT

## 2024-04-17 RX ORDER — FUROSEMIDE INJECTION 80 MG/ 10 ML 8 MG/ML
80 INJECTION SUBCUTANEOUS DAILY
Status: ON HOLD | COMMUNITY
Start: 2024-04-17 | End: 2024-04-23

## 2024-04-17 ASSESSMENT — PAIN SCALES - GENERAL: PAINLEVEL: NO PAIN (1)

## 2024-04-17 NOTE — PROGRESS NOTES
"  Assessment & Plan     Class 3 severe obesity due to excess calories with serious comorbidity and body mass index (BMI) of 50.0 to 59.9 in adult (H)  Start Zepbound, risks, benefits and alternatives discussed   Discussed gradual increase in dose every 4 weeks or so, pt will let me know if medication is not tolerated, can try a different GLP1 if that is the case.     - tirzepatide-Weight Management (ZEPBOUND) 2.5 MG/0.5ML prefilled pen; Inject 0.5 mLs (2.5 mg) Subcutaneous every 7 days    Chronic heart failure with preserved ejection fraction (H)  Seeing cardiology and CORE clinic  Is on bumex  Apparently got pre-approvied for the furoscix subcutaneous infusion - patient did not know what that was and it was flagged for removal.  She does NOT have the device/kit and doesn't know more about it.   I added it back to her medication list as \"historical\"  She has lost 10 lbs since her hospitalization a month ago  - Furosemide (FUROSCIX) 80 MG/10ML CTKT; Inject 80 mg Subcutaneous daily for 6 days    Intertrigo  Improving, needs refill of miconazole  - miconazole (MICATIN) 2 % AERP powder; Apply topically 2 times daily as needed    Multiple sclerosis (H)  Very limited mobility  Not seeing neurology      Obesity hypoventilation syndrome (H)  NARCISA on CPAP  Uses this much of the time at home when she is in her recliner.                  MED REC REQUIRED  Post Medication Reconciliation Status: discharge medications reconciled, continue medications without change  BMI  Estimated body mass index is 54.28 kg/m  as calculated from the following:    Height as of 4/4/24: 1.626 m (5' 4\").    Weight as of this encounter: 143.5 kg (316 lb 4 oz).   Weight management plan: start GLP1 - Zepbound          Cornell Kellogg is a 48 year old, presenting for the following health issues:  Rash        4/17/2024     8:54 AM   Additional Questions   Roomed by Vero abraham CMA   Accompanied by Self     HPI         Hospital Follow-up " Visit:    Hospital/Nursing Home/IP Rehab Facility: [facility]  Date of Admission: 3/16/2024  Date of Discharge: 3/20/2024  Reason(s) for Admission: Cellulitis, sepsis  Was the patient in the ICU or did the patient experience delirium during hospitalization?  No  Do you have any other stressors you would like to discuss with your provider? No    Problems taking medications regularly:  None  Medication changes since discharge: She has finished courser of Anc  Problems adhering to non-medication therapy:  None    Summary of hospitalization:  [facility] discharge summary reviewed  Diagnostic Tests/Treatments reviewed.  Follow up needed: cardiology - scheduled, BMP - will do today  Other Healthcare Providers Involved in Patient s Care:         Homecare  Update since discharge: Today swelling is improved somewhat. No skin pain.          Plan of care communicated with patient           Hospital Course  Bess Enamorado is a 48 year old female with history of morbid obesity, bilateral chronic lymphedema, NARCISA on CPAP, OHS, depression, HFpEF, PE (not on AC) admitted on 3/16/2024 with leg pain, fever and chills secondary to left lower extremity cellulitis with sepsis.     Blood culture sent to the lab on 3/16/2024 grew Staphylococcus lugdunensis.  She received IV meropenem and IV vancomycin briefly and was subsequently transitioned to IV cefazolin and later IV vancomycin at discharge (to be completed on 3/30/2024) as recommended by infectious disease specialist.     She received IV bumetanide in the ER due to concern for pulmonary edema in the setting of chronic HFpEF and was subsequently placed on PTA dosage of oral bumetanide.  Echocardiogram 3/16/2024 revealed normal LV systolic function with LVEF of 65%, moderate to severely dilated right ventricle, and severe pulmonary hypertension.  Pulmonary CT angiogram was negative for pulmonary embolism and lower extremity venous ultrasound  was negative for DVT.     Symptoms have improved significantly and patient is clinically stable for discharge at this time.      Patient did phen phen many years ago    She did outpatient treatment through the [facility] 7-10 years ago  -did intensive nutrition work   -did outpatient partial hospitalization program through [facility] 5 days/week during one summer      Down to 0.5 L via nc at home.  Wears her CPAP when she is in her recliner at home, even if not sleeping.   Oxygen saturation 90% on room air here in clinic today    Review of Systems  Constitutional, HEENT, cardiovascular, pulmonary, gi and gu systems are negative, except as otherwise noted.      Objective    /74   Pulse 88   Temp 97.9  F (36.6  C) (Tympanic)   Resp 20   Wt 143.5 kg (316 lb 4 oz)   LMP  (LMP Unknown)   SpO2 90%   BMI 54.28 kg/m    Body mass index is 54.28 kg/m .  Physical Exam   GENERAL: alert and no distress  NECK: no adenopathy, no asymmetry, masses, or scars  RESP: lungs clear to auscultation - no rales, rhonchi or wheezes  CV: regular rates and rhythm and S3 present  ABDOMEN: soft, nontender, no hepatosplenomegaly, no masses and bowel sounds normal  MS: bilateral edema - wraps in place            Signed Electronically by: Mikala Luna MD

## 2024-04-17 NOTE — TELEPHONE ENCOUNTER
Per fax received from [facility] - Tirzepatide-Weight Management (ZEPBOUND) 2.5 MG/0.5ML prefilled pen  is not covered by patient's Insurance Company  Dr. Luna - Please choose:  1.  Change medication that is not covered to a different medication and send new prescription to patient's pharmacy?  ALL weight loss meds require P.A. per fax from pharmacy   2.  Patient will need to pay for the non-covered medication out-of-pocket?   3.  Try for Prior Authorization with Insurance Company to get medication covered?       Key# [id number]

## 2024-04-18 ENCOUNTER — APPOINTMENT (OUTPATIENT)
Dept: ULTRASOUND IMAGING | Facility: CLINIC | Age: 49
DRG: 872 | End: 2024-04-18
Payer: MEDICARE

## 2024-04-18 ENCOUNTER — HOSPITAL ENCOUNTER (INPATIENT)
Facility: CLINIC | Age: 49
LOS: 5 days | Discharge: HOME HEALTH | DRG: 872 | End: 2024-04-23
Attending: STUDENT IN AN ORGANIZED HEALTH CARE EDUCATION/TRAINING PROGRAM | Admitting: STUDENT IN AN ORGANIZED HEALTH CARE EDUCATION/TRAINING PROGRAM
Payer: MEDICARE

## 2024-04-18 ENCOUNTER — APPOINTMENT (OUTPATIENT)
Dept: CT IMAGING | Facility: CLINIC | Age: 49
DRG: 872 | End: 2024-04-18
Attending: STUDENT IN AN ORGANIZED HEALTH CARE EDUCATION/TRAINING PROGRAM
Payer: MEDICARE

## 2024-04-18 ENCOUNTER — APPOINTMENT (OUTPATIENT)
Dept: GENERAL RADIOLOGY | Facility: CLINIC | Age: 49
DRG: 872 | End: 2024-04-18
Attending: STUDENT IN AN ORGANIZED HEALTH CARE EDUCATION/TRAINING PROGRAM
Payer: MEDICARE

## 2024-04-18 DIAGNOSIS — K74.60 CIRRHOSIS OF LIVER WITHOUT ASCITES, UNSPECIFIED HEPATIC CIRRHOSIS TYPE (H): ICD-10-CM

## 2024-04-18 DIAGNOSIS — I27.20 PULMONARY HYPERTENSION (H): ICD-10-CM

## 2024-04-18 DIAGNOSIS — G35 MULTIPLE SCLEROSIS (H): Chronic | ICD-10-CM

## 2024-04-18 DIAGNOSIS — A41.9 SEPSIS DUE TO CELLULITIS (H): ICD-10-CM

## 2024-04-18 DIAGNOSIS — J96.21 ACUTE AND CHRONIC RESPIRATORY FAILURE WITH HYPOXIA (H): ICD-10-CM

## 2024-04-18 DIAGNOSIS — L03.90 SEPSIS DUE TO CELLULITIS (H): ICD-10-CM

## 2024-04-18 DIAGNOSIS — L03.116 LEFT LEG CELLULITIS: Primary | ICD-10-CM

## 2024-04-18 DIAGNOSIS — I50.9 ACUTE ON CHRONIC CONGESTIVE HEART FAILURE, UNSPECIFIED HEART FAILURE TYPE (H): ICD-10-CM

## 2024-04-18 PROBLEM — L03.119 CELLULITIS OF LOWER EXTREMITY, UNSPECIFIED LATERALITY: Status: ACTIVE | Noted: 2023-12-21

## 2024-04-18 PROBLEM — F33.1 MODERATE EPISODE OF RECURRENT MAJOR DEPRESSIVE DISORDER (H): Chronic | Status: ACTIVE | Noted: 2018-03-23

## 2024-04-18 PROBLEM — F41.1 GENERALIZED ANXIETY DISORDER: Chronic | Status: ACTIVE | Noted: 2020-03-23

## 2024-04-18 PROBLEM — E66.2 OBESITY HYPOVENTILATION SYNDROME (H): Status: ACTIVE | Noted: 2023-12-29

## 2024-04-18 LAB
ACINETOBACTER SPECIES: NOT DETECTED
ALBUMIN SERPL BCG-MCNC: 3.9 G/DL (ref 3.5–5.2)
ALP SERPL-CCNC: 66 U/L (ref 40–150)
ALT SERPL W P-5'-P-CCNC: 18 U/L (ref 0–50)
ANION GAP SERPL CALCULATED.3IONS-SCNC: 13 MMOL/L (ref 7–15)
AST SERPL W P-5'-P-CCNC: 49 U/L (ref 0–45)
BASE EXCESS BLDV CALC-SCNC: 1.8 MMOL/L (ref -3–3)
BASOPHILS # BLD AUTO: 0.1 10E3/UL (ref 0–0.2)
BASOPHILS NFR BLD AUTO: 1 %
BILIRUB SERPL-MCNC: 2.9 MG/DL
BUN SERPL-MCNC: 17.4 MG/DL (ref 6–20)
CALCIUM SERPL-MCNC: 10 MG/DL (ref 8.6–10)
CHLORIDE SERPL-SCNC: 100 MMOL/L (ref 98–107)
CITROBACTER SPECIES: NOT DETECTED
CREAT SERPL-MCNC: 0.66 MG/DL (ref 0.51–0.95)
CRP SERPL-MCNC: 6.71 MG/L
CTX-M: NOT DETECTED
D DIMER PPP FEU-MCNC: 0.65 UG/ML FEU (ref 0–0.5)
DEPRECATED HCO3 PLAS-SCNC: 24 MMOL/L (ref 22–29)
EGFRCR SERPLBLD CKD-EPI 2021: >90 ML/MIN/1.73M2
ENTEROBACTER SPECIES: NOT DETECTED
EOSINOPHIL # BLD AUTO: 0.1 10E3/UL (ref 0–0.7)
EOSINOPHIL NFR BLD AUTO: 2 %
ERYTHROCYTE [DISTWIDTH] IN BLOOD BY AUTOMATED COUNT: 18.5 % (ref 10–15)
ESCHERICHIA COLI: NOT DETECTED
GLUCOSE BLDC GLUCOMTR-MCNC: 88 MG/DL (ref 70–99)
GLUCOSE BLDC GLUCOMTR-MCNC: 89 MG/DL (ref 70–99)
GLUCOSE BLDC GLUCOMTR-MCNC: 97 MG/DL (ref 70–99)
GLUCOSE SERPL-MCNC: 107 MG/DL (ref 70–99)
HCO3 BLDV-SCNC: 27 MMOL/L (ref 21–28)
HCT VFR BLD AUTO: 49.8 % (ref 35–47)
HGB BLD-MCNC: 16 G/DL (ref 11.7–15.7)
HOLD SPECIMEN: NORMAL
HOLD SPECIMEN: NORMAL
IMM GRANULOCYTES # BLD: 0 10E3/UL
IMM GRANULOCYTES NFR BLD: 0 %
IMP: NOT DETECTED
KLEBSIELLA OXYTOCA: DETECTED
KLEBSIELLA PNEUMONIAE: NOT DETECTED
KPC: NOT DETECTED
LACTATE SERPL-SCNC: 2.1 MMOL/L (ref 0.7–2)
LACTATE SERPL-SCNC: 2.3 MMOL/L (ref 0.7–2)
LACTATE SERPL-SCNC: 2.6 MMOL/L (ref 0.7–2)
LYMPHOCYTES # BLD AUTO: 1.1 10E3/UL (ref 0.8–5.3)
LYMPHOCYTES NFR BLD AUTO: 14 %
MCH RBC QN AUTO: 27.7 PG (ref 26.5–33)
MCHC RBC AUTO-ENTMCNC: 32.1 G/DL (ref 31.5–36.5)
MCV RBC AUTO: 86 FL (ref 78–100)
MONOCYTES # BLD AUTO: 0.3 10E3/UL (ref 0–1.3)
MONOCYTES NFR BLD AUTO: 4 %
NDM: NOT DETECTED
NEUTROPHILS # BLD AUTO: 6.2 10E3/UL (ref 1.6–8.3)
NEUTROPHILS NFR BLD AUTO: 80 %
NRBC # BLD AUTO: 0 10E3/UL
NRBC BLD AUTO-RTO: 0 /100
NT-PROBNP SERPL-MCNC: 1602 PG/ML (ref 0–450)
O2/TOTAL GAS SETTING VFR VENT: 80 %
OXA (DETECTED/NOT DETECTED): NOT DETECTED
OXYHGB MFR BLDV: 28 % (ref 70–75)
PCO2 BLDV: 45 MM HG (ref 40–50)
PH BLDV: 7.39 [PH] (ref 7.32–7.43)
PLATELET # BLD AUTO: 164 10E3/UL (ref 150–450)
PO2 BLDV: 19 MM HG (ref 25–47)
POTASSIUM SERPL-SCNC: 4.9 MMOL/L (ref 3.4–5.3)
PROCALCITONIN SERPL IA-MCNC: 0.1 NG/ML
PROT SERPL-MCNC: 8.5 G/DL (ref 6.4–8.3)
PROTEUS SPECIES: NOT DETECTED
PSEUDOMONAS AERUGINOSA: NOT DETECTED
RBC # BLD AUTO: 5.78 10E6/UL (ref 3.8–5.2)
SAO2 % BLDV: 28.5 % (ref 70–75)
SODIUM SERPL-SCNC: 137 MMOL/L (ref 135–145)
TROPONIN T SERPL HS-MCNC: 26 NG/L
TROPONIN T SERPL HS-MCNC: 34 NG/L
TROPONIN T SERPL HS-MCNC: 34 NG/L
VIM: NOT DETECTED
WBC # BLD AUTO: 7.8 10E3/UL (ref 4–11)

## 2024-04-18 PROCEDURE — 250N000009 HC RX 250: Performed by: STUDENT IN AN ORGANIZED HEALTH CARE EDUCATION/TRAINING PROGRAM

## 2024-04-18 PROCEDURE — 36415 COLL VENOUS BLD VENIPUNCTURE: CPT

## 2024-04-18 PROCEDURE — 84484 ASSAY OF TROPONIN QUANT: CPT

## 2024-04-18 PROCEDURE — 93010 ELECTROCARDIOGRAM REPORT: CPT | Performed by: STUDENT IN AN ORGANIZED HEALTH CARE EDUCATION/TRAINING PROGRAM

## 2024-04-18 PROCEDURE — 86706 HEP B SURFACE ANTIBODY: CPT

## 2024-04-18 PROCEDURE — G1010 CDSM STANSON: HCPCS

## 2024-04-18 PROCEDURE — 999N000157 HC STATISTIC RCP TIME EA 10 MIN

## 2024-04-18 PROCEDURE — 87340 HEPATITIS B SURFACE AG IA: CPT

## 2024-04-18 PROCEDURE — 93005 ELECTROCARDIOGRAM TRACING: CPT

## 2024-04-18 PROCEDURE — G0463 HOSPITAL OUTPT CLINIC VISIT: HCPCS | Mod: 25

## 2024-04-18 PROCEDURE — 258N000003 HC RX IP 258 OP 636

## 2024-04-18 PROCEDURE — 99291 CRITICAL CARE FIRST HOUR: CPT | Mod: 25 | Performed by: STUDENT IN AN ORGANIZED HEALTH CARE EDUCATION/TRAINING PROGRAM

## 2024-04-18 PROCEDURE — 99291 CRITICAL CARE FIRST HOUR: CPT | Mod: 25

## 2024-04-18 PROCEDURE — 85379 FIBRIN DEGRADATION QUANT: CPT | Performed by: STUDENT IN AN ORGANIZED HEALTH CARE EDUCATION/TRAINING PROGRAM

## 2024-04-18 PROCEDURE — 999N000158 HC STATISTIC RCP TIME ED VENT EA 10 MIN

## 2024-04-18 PROCEDURE — 86704 HEP B CORE ANTIBODY TOTAL: CPT

## 2024-04-18 PROCEDURE — 71045 X-RAY EXAM CHEST 1 VIEW: CPT

## 2024-04-18 PROCEDURE — 76604 US EXAM CHEST: CPT

## 2024-04-18 PROCEDURE — 93970 EXTREMITY STUDY: CPT

## 2024-04-18 PROCEDURE — 86140 C-REACTIVE PROTEIN: CPT | Performed by: STUDENT IN AN ORGANIZED HEALTH CARE EDUCATION/TRAINING PROGRAM

## 2024-04-18 PROCEDURE — 84145 PROCALCITONIN (PCT): CPT | Performed by: STUDENT IN AN ORGANIZED HEALTH CARE EDUCATION/TRAINING PROGRAM

## 2024-04-18 PROCEDURE — 86803 HEPATITIS C AB TEST: CPT

## 2024-04-18 PROCEDURE — 94660 CPAP INITIATION&MGMT: CPT

## 2024-04-18 PROCEDURE — 250N000011 HC RX IP 250 OP 636: Performed by: STUDENT IN AN ORGANIZED HEALTH CARE EDUCATION/TRAINING PROGRAM

## 2024-04-18 PROCEDURE — 87149 DNA/RNA DIRECT PROBE: CPT | Performed by: STUDENT IN AN ORGANIZED HEALTH CARE EDUCATION/TRAINING PROGRAM

## 2024-04-18 PROCEDURE — 87186 SC STD MICRODIL/AGAR DIL: CPT | Performed by: STUDENT IN AN ORGANIZED HEALTH CARE EDUCATION/TRAINING PROGRAM

## 2024-04-18 PROCEDURE — 83605 ASSAY OF LACTIC ACID: CPT | Performed by: STUDENT IN AN ORGANIZED HEALTH CARE EDUCATION/TRAINING PROGRAM

## 2024-04-18 PROCEDURE — 83880 ASSAY OF NATRIURETIC PEPTIDE: CPT | Performed by: STUDENT IN AN ORGANIZED HEALTH CARE EDUCATION/TRAINING PROGRAM

## 2024-04-18 PROCEDURE — 76604 US EXAM CHEST: CPT | Mod: 26 | Performed by: STUDENT IN AN ORGANIZED HEALTH CARE EDUCATION/TRAINING PROGRAM

## 2024-04-18 PROCEDURE — 258N000003 HC RX IP 258 OP 636: Performed by: STUDENT IN AN ORGANIZED HEALTH CARE EDUCATION/TRAINING PROGRAM

## 2024-04-18 PROCEDURE — 84484 ASSAY OF TROPONIN QUANT: CPT | Performed by: STUDENT IN AN ORGANIZED HEALTH CARE EDUCATION/TRAINING PROGRAM

## 2024-04-18 PROCEDURE — 83605 ASSAY OF LACTIC ACID: CPT

## 2024-04-18 PROCEDURE — 36415 COLL VENOUS BLD VENIPUNCTURE: CPT | Performed by: STUDENT IN AN ORGANIZED HEALTH CARE EDUCATION/TRAINING PROGRAM

## 2024-04-18 PROCEDURE — 80053 COMPREHEN METABOLIC PANEL: CPT | Performed by: STUDENT IN AN ORGANIZED HEALTH CARE EDUCATION/TRAINING PROGRAM

## 2024-04-18 PROCEDURE — 82805 BLOOD GASES W/O2 SATURATION: CPT | Performed by: STUDENT IN AN ORGANIZED HEALTH CARE EDUCATION/TRAINING PROGRAM

## 2024-04-18 PROCEDURE — 200N000001 HC R&B ICU

## 2024-04-18 PROCEDURE — 96365 THER/PROPH/DIAG IV INF INIT: CPT | Mod: 59

## 2024-04-18 PROCEDURE — 250N000013 HC RX MED GY IP 250 OP 250 PS 637

## 2024-04-18 PROCEDURE — 5A09457 ASSISTANCE WITH RESPIRATORY VENTILATION, 24-96 CONSECUTIVE HOURS, CONTINUOUS POSITIVE AIRWAY PRESSURE: ICD-10-PCS | Performed by: INTERNAL MEDICINE

## 2024-04-18 PROCEDURE — 96366 THER/PROPH/DIAG IV INF ADDON: CPT

## 2024-04-18 PROCEDURE — 250N000011 HC RX IP 250 OP 636: Mod: JZ | Performed by: STUDENT IN AN ORGANIZED HEALTH CARE EDUCATION/TRAINING PROGRAM

## 2024-04-18 PROCEDURE — 85025 COMPLETE CBC W/AUTO DIFF WBC: CPT | Performed by: STUDENT IN AN ORGANIZED HEALTH CARE EDUCATION/TRAINING PROGRAM

## 2024-04-18 PROCEDURE — 96375 TX/PRO/DX INJ NEW DRUG ADDON: CPT

## 2024-04-18 PROCEDURE — 96368 THER/DIAG CONCURRENT INF: CPT

## 2024-04-18 PROCEDURE — 250N000011 HC RX IP 250 OP 636

## 2024-04-18 PROCEDURE — 99223 1ST HOSP IP/OBS HIGH 75: CPT | Mod: AI

## 2024-04-18 PROCEDURE — 99292 CRITICAL CARE ADDL 30 MIN: CPT

## 2024-04-18 RX ORDER — MEROPENEM 1 G/1
1 INJECTION, POWDER, FOR SOLUTION INTRAVENOUS EVERY 8 HOURS
Status: DISCONTINUED | OUTPATIENT
Start: 2024-04-18 | End: 2024-04-19

## 2024-04-18 RX ORDER — PROCHLORPERAZINE 25 MG
25 SUPPOSITORY, RECTAL RECTAL EVERY 12 HOURS PRN
Status: DISCONTINUED | OUTPATIENT
Start: 2024-04-18 | End: 2024-04-23 | Stop reason: HOSPADM

## 2024-04-18 RX ORDER — METOPROLOL SUCCINATE 25 MG/1
25 TABLET, EXTENDED RELEASE ORAL DAILY
Status: DISCONTINUED | OUTPATIENT
Start: 2024-04-18 | End: 2024-04-23 | Stop reason: HOSPADM

## 2024-04-18 RX ORDER — IOPAMIDOL 755 MG/ML
96 INJECTION, SOLUTION INTRAVASCULAR ONCE
Status: COMPLETED | OUTPATIENT
Start: 2024-04-18 | End: 2024-04-18

## 2024-04-18 RX ORDER — NITROGLYCERIN 0.4 MG/1
TABLET SUBLINGUAL
Status: COMPLETED
Start: 2024-04-18 | End: 2024-04-18

## 2024-04-18 RX ORDER — BUMETANIDE 1 MG/1
4 TABLET ORAL 2 TIMES DAILY
Status: DISCONTINUED | OUTPATIENT
Start: 2024-04-18 | End: 2024-04-21

## 2024-04-18 RX ORDER — NALOXONE HYDROCHLORIDE 0.4 MG/ML
0.2 INJECTION, SOLUTION INTRAMUSCULAR; INTRAVENOUS; SUBCUTANEOUS
Status: DISCONTINUED | OUTPATIENT
Start: 2024-04-18 | End: 2024-04-23 | Stop reason: HOSPADM

## 2024-04-18 RX ORDER — HYDROMORPHONE HYDROCHLORIDE 1 MG/ML
0.3 INJECTION, SOLUTION INTRAMUSCULAR; INTRAVENOUS; SUBCUTANEOUS
Status: DISCONTINUED | OUTPATIENT
Start: 2024-04-18 | End: 2024-04-23 | Stop reason: HOSPADM

## 2024-04-18 RX ORDER — NALOXONE HYDROCHLORIDE 0.4 MG/ML
0.4 INJECTION, SOLUTION INTRAMUSCULAR; INTRAVENOUS; SUBCUTANEOUS
Status: DISCONTINUED | OUTPATIENT
Start: 2024-04-18 | End: 2024-04-23 | Stop reason: HOSPADM

## 2024-04-18 RX ORDER — BUMETANIDE 0.25 MG/ML
1 INJECTION INTRAMUSCULAR; INTRAVENOUS ONCE
Status: COMPLETED | OUTPATIENT
Start: 2024-04-18 | End: 2024-04-18

## 2024-04-18 RX ORDER — PROCHLORPERAZINE MALEATE 10 MG
10 TABLET ORAL EVERY 6 HOURS PRN
Status: DISCONTINUED | OUTPATIENT
Start: 2024-04-18 | End: 2024-04-23 | Stop reason: HOSPADM

## 2024-04-18 RX ORDER — ONDANSETRON 2 MG/ML
4 INJECTION INTRAMUSCULAR; INTRAVENOUS EVERY 6 HOURS PRN
Status: CANCELLED | OUTPATIENT
Start: 2024-04-18

## 2024-04-18 RX ORDER — POLYETHYLENE GLYCOL 3350 17 G/17G
17 POWDER, FOR SOLUTION ORAL DAILY PRN
Status: DISCONTINUED | OUTPATIENT
Start: 2024-04-18 | End: 2024-04-23 | Stop reason: HOSPADM

## 2024-04-18 RX ORDER — ONDANSETRON 4 MG/1
4 TABLET, ORALLY DISINTEGRATING ORAL EVERY 6 HOURS PRN
Status: CANCELLED | OUTPATIENT
Start: 2024-04-18

## 2024-04-18 RX ORDER — ACETAMINOPHEN 325 MG/1
325 TABLET ORAL EVERY 4 HOURS PRN
Status: DISCONTINUED | OUTPATIENT
Start: 2024-04-18 | End: 2024-04-23 | Stop reason: HOSPADM

## 2024-04-18 RX ORDER — ENOXAPARIN SODIUM 100 MG/ML
40 INJECTION SUBCUTANEOUS EVERY 12 HOURS
Status: DISCONTINUED | OUTPATIENT
Start: 2024-04-18 | End: 2024-04-23 | Stop reason: HOSPADM

## 2024-04-18 RX ORDER — PIPERACILLIN SODIUM, TAZOBACTAM SODIUM 4; .5 G/20ML; G/20ML
4.5 INJECTION, POWDER, LYOPHILIZED, FOR SOLUTION INTRAVENOUS EVERY 6 HOURS
Status: DISCONTINUED | OUTPATIENT
Start: 2024-04-18 | End: 2024-04-18

## 2024-04-18 RX ORDER — CALCIUM CARBONATE 500 MG/1
1000 TABLET, CHEWABLE ORAL 4 TIMES DAILY PRN
Status: DISCONTINUED | OUTPATIENT
Start: 2024-04-18 | End: 2024-04-23 | Stop reason: HOSPADM

## 2024-04-18 RX ORDER — SERTRALINE HYDROCHLORIDE 100 MG/1
100 TABLET, FILM COATED ORAL DAILY
Status: DISCONTINUED | OUTPATIENT
Start: 2024-04-18 | End: 2024-04-23 | Stop reason: HOSPADM

## 2024-04-18 RX ORDER — NITROGLYCERIN 0.4 MG/1
0.4 TABLET SUBLINGUAL EVERY 5 MIN PRN
Status: DISCONTINUED | OUTPATIENT
Start: 2024-04-18 | End: 2024-04-18

## 2024-04-18 RX ADMIN — METOPROLOL SUCCINATE 25 MG: 25 TABLET, EXTENDED RELEASE ORAL at 13:16

## 2024-04-18 RX ADMIN — PIPERACILLIN AND TAZOBACTAM 4.5 G: 4; .5 INJECTION, POWDER, FOR SOLUTION INTRAVENOUS at 05:14

## 2024-04-18 RX ADMIN — PIPERACILLIN AND TAZOBACTAM 4.5 G: 4; .5 INJECTION, POWDER, FOR SOLUTION INTRAVENOUS at 22:20

## 2024-04-18 RX ADMIN — MEROPENEM 1 G: 1 INJECTION, POWDER, FOR SOLUTION INTRAVENOUS at 23:29

## 2024-04-18 RX ADMIN — HYDROMORPHONE HYDROCHLORIDE 0.3 MG: 1 INJECTION, SOLUTION INTRAMUSCULAR; INTRAVENOUS; SUBCUTANEOUS at 23:28

## 2024-04-18 RX ADMIN — NITROGLYCERIN: 0.4 TABLET SUBLINGUAL at 04:55

## 2024-04-18 RX ADMIN — ACETAMINOPHEN 325 MG: 325 TABLET, FILM COATED ORAL at 20:00

## 2024-04-18 RX ADMIN — PIPERACILLIN AND TAZOBACTAM 4.5 G: 4; .5 INJECTION, POWDER, FOR SOLUTION INTRAVENOUS at 11:13

## 2024-04-18 RX ADMIN — PIPERACILLIN AND TAZOBACTAM 4.5 G: 4; .5 INJECTION, POWDER, FOR SOLUTION INTRAVENOUS at 16:53

## 2024-04-18 RX ADMIN — SODIUM CHLORIDE 100 ML: 9 INJECTION, SOLUTION INTRAVENOUS at 05:57

## 2024-04-18 RX ADMIN — IOPAMIDOL 96 ML: 755 INJECTION, SOLUTION INTRAVENOUS at 05:57

## 2024-04-18 RX ADMIN — SERTRALINE HYDROCHLORIDE 100 MG: 100 TABLET ORAL at 13:16

## 2024-04-18 RX ADMIN — ENOXAPARIN SODIUM 40 MG: 100 INJECTION SUBCUTANEOUS at 21:33

## 2024-04-18 RX ADMIN — BUMETANIDE 1 MG: 0.25 INJECTION INTRAMUSCULAR; INTRAVENOUS at 06:10

## 2024-04-18 RX ADMIN — ENOXAPARIN SODIUM 40 MG: 100 INJECTION SUBCUTANEOUS at 11:11

## 2024-04-18 RX ADMIN — HYDROMORPHONE HYDROCHLORIDE 0.3 MG: 1 INJECTION, SOLUTION INTRAMUSCULAR; INTRAVENOUS; SUBCUTANEOUS at 16:29

## 2024-04-18 RX ADMIN — VANCOMYCIN HYDROCHLORIDE 1500 MG: 5 INJECTION, POWDER, LYOPHILIZED, FOR SOLUTION INTRAVENOUS at 05:14

## 2024-04-18 RX ADMIN — SODIUM CHLORIDE, POTASSIUM CHLORIDE, SODIUM LACTATE AND CALCIUM CHLORIDE 500 ML: 600; 310; 30; 20 INJECTION, SOLUTION INTRAVENOUS at 13:16

## 2024-04-18 RX ADMIN — VANCOMYCIN HYDROCHLORIDE 1500 MG: 5 INJECTION, POWDER, LYOPHILIZED, FOR SOLUTION INTRAVENOUS at 17:57

## 2024-04-18 RX ADMIN — SODIUM CHLORIDE, POTASSIUM CHLORIDE, SODIUM LACTATE AND CALCIUM CHLORIDE 1000 ML: 600; 310; 30; 20 INJECTION, SOLUTION INTRAVENOUS at 18:05

## 2024-04-18 ASSESSMENT — ACTIVITIES OF DAILY LIVING (ADL)
ADLS_ACUITY_SCORE: 51
ADLS_ACUITY_SCORE: 50
ADLS_ACUITY_SCORE: 57
ADLS_ACUITY_SCORE: 38
ADLS_ACUITY_SCORE: 38
ADLS_ACUITY_SCORE: 51
ADLS_ACUITY_SCORE: 50
ADLS_ACUITY_SCORE: 47
ADLS_ACUITY_SCORE: 38
ADLS_ACUITY_SCORE: 57
ADLS_ACUITY_SCORE: 50
ADLS_ACUITY_SCORE: 38
ADLS_ACUITY_SCORE: 51
ADLS_ACUITY_SCORE: 47
ADLS_ACUITY_SCORE: 47

## 2024-04-18 NOTE — PHARMACY-VANCOMYCIN DOSING SERVICE
"Pharmacy Vancomycin Initial Note  Date of Service 2024  Patient's  1975  48 year old, female    Indication: Sepsis and Skin and Soft Tissue Infection    Current estimated CrCl = Estimated Creatinine Clearance: 134.6 mL/min (based on SCr of 0.72 mg/dL).    Creatinine for last 3 days  2024:  9:24 AM Creatinine 0.72 mg/dL    Recent Vancomycin Level(s) for last 3 days  No results found for requested labs within last 3 days.      Vancomycin IV Administrations (past 72 hours)        No vancomycin orders with administrations in past 72 hours.                    Nephrotoxins and other renal medications (From now, onward)      Start     Dose/Rate Route Frequency Ordered Stop    24 0515  vancomycin (VANCOCIN) 1,500 mg in sodium chloride 0.9 % 250 mL intermittent infusion         1,500 mg  over 90 Minutes Intravenous EVERY 12 HOURS 24 0510      24 0505  piperacillin-tazobactam (ZOSYN) 4.5 g vial to attach to  mL bag        Note to Pharmacy: For [facility], [facility] and [facility]: For Zosyn-naive patients, use the \"Zosyn initial dose + extended infusion\" order panel.    4.5 g  over 30 Minutes Intravenous EVERY 6 HOURS 24 0506              Contrast Orders - past 72 hours (72h ago, onward)      None            [employer] Prediction of Planned Initial Vancomycin Regimen  Regimen: 1500 mg IV every 12 hours.  Start time: 05:09 on 2024  Exposure target: AUC24 (range)400-600 mg/L.hr   AUC24,ss: 599 mg/L.hr  Probability of AUC24 > 400: 78 %  Ctrough,ss: 16 mg/L  Probability of Ctrough,ss > 20: 40 %  Probability of nephrotoxicity (Lodise ARMANDO ): 11 %    Plan:  Start vancomycin  1500 mg IV q12h.   Vancomycin monitoring method: AUC  Vancomycin therapeutic monitoring goal: 400-600 mg*h/L  Pharmacy will check vancomycin levels as appropriate in 1-3 Days.    Serum creatinine levels will be ordered daily for the first week of therapy and at least twice weekly for subsequent weeks.      Marco A RODRIGUEZ" KEYANNA Mukherjee

## 2024-04-18 NOTE — ED NOTES
Bipap settings: Ipap 14/Epap 7, 35% O2, rate 14. Pt is tolerating bipap well. Provide at bedside.

## 2024-04-18 NOTE — CONSULTS
[facility]  WO Nurse Inpatient Assessment     Consulted for: Left knee wound    Summary: Significant intertrigo with denudement of left posterior knee. Recommend management with Interdry Ag as area is very painful for pt and hard to pull apart with deep skin fold, this will offer best option for pain control and moisture management with fungal control. Ultimately needs lymphedema therapy, though pt states cannot do outpatient therapy as she has no transportation. Recommend care management consult for this reason, inpatient lymphedema therapy consult also when able, (currently too much pain in leg). Pt also would benefit from Interdry Ag for abd fold.    Patient History (according to provider note(s):      Assessment & Plan  Bess Enamorado is a 48 year old female with past medical history of severe pulmonary hypertension with HFpEF, chronic oxygen & CPAP dependence, obesity hypoventilation syndrome, recent admission for cellulitis with recent completion of IV antibiotics, anxiety, hypertension, history of pulmonary embolism, eating disorder, previous NSTEMI now presents on 4/18/2024 with leg pain. She was also found by EMS to be hypoxic with perioral cyanosis. She is being admitted for treatment of lower extremity cellulitis & ongoing management of chronic hypoxic respiratory failure.     Sepsis   Left lower extremity cellulitis     Presents with left leg pain beginning overnight 4/17-4/18. Septic based on fever (100.4F, tachycardia (104bpm), and tachypnea (20/min). Lactic acid elevated (2.6).    Recently admitted 3/16-3/20/24 for left lower extremity cellulitis due to staph lugdunensis treated with IV meropenem, IV vancomycin, IV cefazolin, and then changed back to IV vanco completed outpatient 3/30/24 per ID recommendations. Felt things were nearly back to normal until 1 day PTA. Suspect source of sepsis is left leg cellulitis, concern for re-infection due to open wounds behind knee.    -Vancomycin with dosing per pharmacy  -Piperacillin-tazobactam 4.5g Q6hrs  -Blood cultures 4/18 x2 NGTD  -Lactated ringer 500mL over 5 hours   -Recheck lactic acid. Further fluid repletion pending results     Chronic heart failure with preserved ejection fraction   Pulmonary hypertension, severe    Follows with Dr. Diaz, [facility]. Last seen 4/4/24 for severe R heart failure & critical pulm HTN with severe right-sided congestion & renal congestion causing inability to adequately diurese. For this reason, PTA bumetanide was increased to 4mg BID (from 3mg mg daily). Subsequent plan was for addition of metolazone or subcutaneous furosemide if not diuresing with increased bumetanide.     On presentation appears euvolemic. Has central pulm congestion on xray, but CT overall clear. BNP elevated but actually improved compared to previous admission (1602). Has persistent S3, but mucous membranes are dry & says she's had decreased PO intake & urine output after bumetanide this AM. Suspect patient is mildly dry.  -HOLD PTA bumetanide for now  -Initiate 500mL lactated ringer over 5 hours for gentle & limited fluid repletion  -Strict I&O  -Daily weights      Chronic respiratory failure with hypoxia   Noted by EMS to by cyanotic around lips & fingers. Initiated on BiPAP in ER. Pulse oximeter shows SPO2 consistently >90%, with VBG showing hypoxia (pO2 19), otherwise WNL. Endorses consistent, near-constant CPAP prior to EMS arrival.   CXR shows enlarged cardiac silhouette with central vascular congestion. CT PE study shows no PE, large pulmonary arterial tree consistent with pulmonary HTN. Low suspicion for acute worsening of chronic respiratory failure.   -Continue BiPAP PRN with weaning to home CPAP as able  -Continuous pulse ox     Concern for hepatic cirrhosis  Noted incidentally on CT PE is nodular contour of liver concerning for cirrhosis. Noted during previous CT scan March 2024 but no formal diagnosis prior to  that. Could be due to chronic congestion from severe pulmonary HTN. LFTs mildly elevated in mixed pattern (AST 49, total bilirubin 2.9. Remainder WNL: ALT 18, alk phos 66).   -Hepatitis B surface antibody, core antibody, & surface antigen  -Hepatitis C antibody   -CMP in AM     Elevated d-dimer  D-dimer on presentation mildly elevated. History of PE. CT PE study 4/18/24 negative for acute PE. Lower extremity duplex US pending to rule out DVT in lower extremities.   -LE doppler US ordered     Obesity hypoventilation syndrome on CPAP  Morbid obesity  BMP >50. Dependent on CPAP for most of the day while in recliner. Recently prescribed Zepbound injection weekly for weight loss at PCP visit 4/17/24 but has not started yet. Initiated on BiPAP on presentation for acute respiratory distress. Discussed with RT, plan to wean to home CPAP settings ([id number]) as able.   -Continue home CPAP with stable home settings     Erythrocytosis  Mild, new. Hemoglobin baseline around 15.0, hemoglobin on presentation 16.0. Suspect hemoconcentration from decreased PO intake & increased diuresis.   -Gentle IVF 500mL lactated ringer over 5 hours  -CBC in AM     Hypertension  Normotensive on admission. Managed PTA with metoprolol succinate 25mg daily, continue with hold parameters.      Major depressive disorder   Generalized anxiety disorder  Stable. Managed PTA with sertraline 100mg daily, continue.      History of pulmonary embolism  History of right upper lobe PE discovered Feb 2022 in setting of acute covid infection, treated with xarelto which was stopped after 3 months treatment by pulmonology. Not currently on anticoagulation.      Multiple sclerosis   Very immobile. Does not follow with neurology. Not on any outpatient pharmacologic management. Symptoms stable.     Assessment:      Areas visualized during today's visit:  posterior left knee    Wound location: Left posterior knee    Last photo: taken on admission  Wound due to:  Intertrigo  Wound history/plan of care: noted on admission. Pt with significant lymphedema, states she had lymphedema therapy about 4 years ago and has garments she wears, also has a pump but had not been using it. States cannot do outpatient therapy as she does not have transportation.  Wound base: 100 % dermis, denuded/spotty      Palpation of the wound bed: normal      Drainage: moderate     Description of drainage: serosanguinous     Measurements (length x width x depth, in cm): unable to measure, very hard to knee skin fold apart, causing pt significant pain, estimate at least 5x12 cm area denudement that is not confluent     Tunneling: no     Undermining: no  Periwound skin:  erythema      Color: pink      Temperature: normal   Odor: none  Pain:  very significant pain with cares and any movement of her leg  Pain interventions prior to dressing change: staff nurse requesting something for pain from provider as will need prior to thorough care  Treatment goal: Heal , Drainage control, Decrease moisture, Pain control, and Promote epidermal migration  STATUS: initial assessment  Supplies ordered: gathered and at bedside       Treatment Plan:     Left posterior knee wound(s): BID, decreased to once daily once area is improved. Premedicate for pain prior to cares. Remove old Interdry Ag and discard. Cleanse skin fold with Microklenz and gauze and dry with gauze. Place NEW piece of Interdry Ag across entire skin fold taking care that there is at least 2 inches of fabric hanging out.    Would recommend use of Interdry ag also for abd fold, used as following, though would do daily cares.   Wash skin gently. Pat dry, do not rub.  Cut the appropriate size of fabric with clean scissors, allowing a minimum of 2 inches of the fabric exposed outside the skin fold for moisture evaporation.  Lay a single layer of fabric in the skin fold, placing one edge of the fabric into the base of the fold. Gently smooth the rest of the  fabric over the skin, keeping it flat. Leave at least 2 inches of the fabric exposed outside the skin fold.  Secure the fabric in one of several ways; with the skin fold, with a small amount of tape, or tucked under clothing.  When to Dispose: Each piece of InterDry may be used up to 5 days, depending on fabric soiling, amount of moisture and general skin condition may need to replace more often where skin is denuded. May label with skin marker to date    Removal: Remove the fabric before bathing and reuse when finished. When removing the fabric from skin folds, gently separate the skin fold and lift away fabric.        Orders: Written    RECOMMEND PRIMARY TEAM ORDER: Lymphedema consult  Education provided: plan of care  Discussed plan of care with: Patient and Nurse  WOC nurse follow-up plan: weekly  Notify WOC if wound(s) deteriorate.  Nursing to notify the Provider(s) and re-consult the WOC Nurse if new skin concern.    DATA:     Current support surface: Standard  Low air loss (PASCUAL pump, Isolibrium, Pulsate, skin guard, etc)- consider bariatric for better positioning due to pt's girth  Containment of urine/stool:  Purewick  BMI: There is no height or weight on file to calculate BMI.   Active diet order: Orders Placed This Encounter      Regular Diet Adult     Output: No intake/output data recorded.     Labs:   Recent Labs   Lab 04/18/24  0510 04/18/24  0456   ALBUMIN 3.9  --    HGB  --  16.0*   WBC  --  7.8     Pressure injury risk assessment:   Sensory Perception: 4-->no impairment  Moisture: 2-->very moist  Activity: 1-->bedfast  Mobility: 2-->very limited  Nutrition: 2-->probably inadequate  Friction and Shear: 2-->potential problem  Ben Score: 13    Sirena Wilcox RN, CWOCN  Pager no longer is use, please contact through [employer] [facility]: WOC Nurse

## 2024-04-18 NOTE — PROGRESS NOTES
End Of Shift Note 1500 - 1900    Situation: increased SOB and increased swelling to legs and pain     Plan: antibiotics, BIPAP until CPAP gets here from home     Subjective/Objective:    Neuro: A/O X 3     Cardiac: SR     Resp: BIPAP 14/7 30%- uses NC 5 L when eating and drinking. Brother will bring CPAP. When layed flat her face and lips get cyanotic color. 02 sat's maintain above 90%.     GI/: external cath in place. Having loose BM's.      Endo:poor appetite. Drinking good amounts.    MSK: significantly immobile. Lives home alone. Reports she uses a walker a pivots to wheelchair.     Skin: WOC was by this afternoon. BLE are very painful. Dilaudid IV was given for wound cares.     LDAs: PIV X 2

## 2024-04-18 NOTE — PROGRESS NOTES
Tele ICU Consult Note:      I am seeing the patient via telemedicine which does not replace a bedside examination by the in house provider. I reviewed all the data from epic and formulated a plan which was conveyed with the in house provider.    Assessment:  Bess Enamorado is 48 year old female who is admitted to the ICU for   treatment of lower extremity cellulitis & ongoing management of chronic hypoxic respiratory failure     Hospital Course  Hospital Day: 0    Weight:  Wt Readings from Last 3 Encounters:   04/17/24 143.5 kg (316 lb 4 oz)   04/04/24 149.7 kg (330 lb)   03/20/24 150 kg (330 lb 11 oz)       Vitals:   Temp:  [98.7  F (37.1  C)-100.4  F (38  C)] 98.7  F (37.1  C)  Pulse:  [] 84  Resp:  [0-29] 8  BP: ()/() 104/66  FiO2 (%):  [35 %] 35 %  SpO2:  [84 %-99 %] 93 %    I/O's:  No intake or output data in the 24 hours ending 04/18/24 1512    Vent Settings:   FiO2 (%): 35 %  Resp: (!) 8     pH Arterial   Date Value Ref Range Status   02/08/2022 7.46 (H) 7.37 - 7.44 Final     pH   Date Value Ref Range Status   04/10/2022 7.42 7.35 - 7.45 Final     pCO2 Arterial   Date Value Ref Range Status   02/08/2022 34 (L) 35 - 45 mm Hg Final     pO2 Arterial   Date Value Ref Range Status   02/08/2022 63 (L) 80 - 90 mm Hg Final     Bicarbonate Arterial   Date Value Ref Range Status   02/08/2022 25 23 - 29 mmol/L Final     O2 Sat, Arterial   Date Value Ref Range Status   02/08/2022 94.2 (L) 96.0 - 97.0 % Final     Base Excess/Deficit Arterial   Date Value Ref Range Status   02/08/2022 0.0   mmol/L Final       Labs:  Recent Labs   Lab Test 04/18/24  0456 03/25/24  0930 03/19/24  0438   WBC 7.8 4.6 4.2   HGB 16.0* 14.8 13.6   HCT 49.8* 45.7 43.8    203 175     Recent Labs   Lab Test 04/18/24  0510 04/17/24  0924 03/17/24  0534 08/26/22  1045 07/20/22  1505 04/12/22  0612 04/11/22  0638 04/08/22  0715 04/07/22  2241    137 138   < > 136   < > 142   < >  --    CO2 24 28 26   < > 33*   < > 23   < >   --    BUN 17.4 18.4 14.1   < > 19   < > 50*   < >  --    PHOS  --   --   --   --  2.1*  --  3.2  --  8.5*    < > = values in this interval not displayed.     Recent Labs   Lab Test 02/11/22  0502   INR 1.23*     Recent Labs   Lab Test 04/10/22  1515 02/08/22  2329   PH 7.42 7.46*       Diagnostics:  I have reviewed the relevant diagnostic imaging.  CT PE and CXR    Microbiology:  I have reviewed the relevant microbiology.    Medications:  Current Facility-Administered Medications   Medication Dose Route Frequency Provider Last Rate Last Admin    [Held by provider] bumetanide (BUMEX) tablet 4 mg  4 mg Oral BID Shahana Davila PA-C        enoxaparin ANTICOAGULANT (LOVENOX) injection 40 mg  40 mg Subcutaneous Q12H Shahana Davila PA-C   40 mg at 04/18/24 1111    lactated ringers BOLUS 500 mL  500 mL Intravenous Once Shahana Davila PA-C 100 mL/hr at 04/18/24 1316 500 mL at 04/18/24 1316    metoprolol succinate ER (TOPROL XL) 24 hr tablet 25 mg  25 mg Oral Daily Shahana Davila PA-C   25 mg at 04/18/24 1316    piperacillin-tazobactam (ZOSYN) 4.5 g vial to attach to  mL bag  4.5 g Intravenous Q6H Teja Monet MD   4.5 g at 04/18/24 1113    sertraline (ZOLOFT) tablet 100 mg  100 mg Oral Daily Shahana Davila PA-C   100 mg at 04/18/24 1316    vancomycin (VANCOCIN) 1,500 mg in sodium chloride 0.9 % 250 mL intermittent infusion  1,500 mg Intravenous Q12H Teja Monet MD   Stopped at 04/18/24 0725     Current Facility-Administered Medications   Medication Dose Route Frequency Provider Last Rate Last Admin       Plan:  Agree with stated plan. If oxygenation continues to worsen or hemodynamically deteriorates would consider TTE to evaluate right heart function. Given the IV antibiotics and increased total IV Volume low threshold to aggressively diurese.

## 2024-04-18 NOTE — PLAN OF CARE
End Of Shift Note    Situation: increased shortness of breath at home    Plan: ABX, monitor    Subjective/Objective:    Neuro: A/Ox3, afebrile     Cardiac: SR, normotensive    Resp: Bipap 14/7 30%, CPAP at home 14, can use NC 5L when eating to maintain SpO2    GI/: external cath, low urine output    Endo: BG within adequate range    MSK: Walker at baseline.  Minimal movement due to welling, pain    Skin: fragile, breakdown behind knee, legs, under abdominal folds    LDAs: PIV x3,

## 2024-04-18 NOTE — ED TRIAGE NOTES
Pt arrives here via EMS from home with shortness of breath and cyanotic to the face, lips, and fingertips. EMS placed pt on 10L non re breather with oxygen saturation in the 80s Pts BP was hypertensive initially. SBP in the 170s. Upon arrival in the ED pt was placed on bipap immediatly. SBP in the 130s. Pt reports having cellulitis on her lower extremities. Pt reports tenderness upon palpation of the lower left extremities. Leg appears reddened. Pt states that she began feeling short of air around 2am. Denies chest pain.

## 2024-04-18 NOTE — H&P
[facility]    History and Physical  Hospital Medicine       Date of Admission:  4/18/2024  Date of Service: 4/18/2024     Assessment & Plan   Bess Enamorado is a 48 year old female with past medical history of severe pulmonary hypertension with HFpEF, chronic oxygen & CPAP dependence, obesity hypoventilation syndrome, recent admission for cellulitis with recent completion of IV antibiotics, anxiety, hypertension, history of pulmonary embolism, eating disorder, previous NSTEMI now presents on 4/18/2024 with leg pain. She was also found by EMS to be hypoxic with perioral cyanosis. She is being admitted for treatment of lower extremity cellulitis & ongoing management of chronic hypoxic respiratory failure.    Sepsis   Left lower extremity cellulitis     Presents with left leg pain beginning overnight 4/17-4/18. Septic based on fever (100.4F, tachycardia (104bpm), and tachypnea (20/min). Lactic acid elevated (2.6).    Recently admitted 3/16-3/20/24 for left lower extremity cellulitis due to staph lugdunensis treated with IV meropenem, IV vancomycin, IV cefazolin, and then changed back to IV vanco completed outpatient 3/30/24 per ID recommendations. Felt things were nearly back to normal until 1 day PTA. Suspect source of sepsis is left leg cellulitis, concern for re-infection due to open wounds behind knee.   -Vancomycin with dosing per pharmacy  -Piperacillin-tazobactam 4.5g Q6hrs  -Blood cultures 4/18 x2 NGTD  -Lactated ringer 500mL over 5 hours   -Recheck lactic acid. Further fluid repletion pending results    Chronic heart failure with preserved ejection fraction   Pulmonary hypertension, severe    Follows with Dr. Diaz, [place]. Last seen 4/4/24 for severe R heart failure & critical pulm HTN with severe right-sided congestion & renal congestion causing inability to adequately diurese. For this reason, PTA bumetanide was increased to 4mg BID (from 3mg mg daily). Subsequent plan  was for addition of metolazone or subcutaneous furosemide if not diuresing with increased bumetanide.     On presentation appears euvolemic. Has central pulm congestion on xray, but CT overall clear. BNP elevated but actually improved compared to previous admission (1602). Has persistent S3, but mucous membranes are dry & says she's had decreased PO intake & urine output after bumetanide this AM. Suspect patient is mildly dry.  -HOLD PTA bumetanide for now  -Initiate 500mL lactated ringer over 5 hours for gentle & limited fluid repletion  -Strict I&O  -Daily weights     Chronic respiratory failure with hypoxia   Noted by EMS to by cyanotic around lips & fingers. Initiated on BiPAP in ER. Pulse oximeter shows SPO2 consistently >90%, with VBG showing hypoxia (pO2 19), otherwise WNL. Endorses consistent, near-constant CPAP prior to EMS arrival.   CXR shows enlarged cardiac silhouette with central vascular congestion. CT PE study shows no PE, large pulmonary arterial tree consistent with pulmonary HTN. Low suspicion for acute worsening of chronic respiratory failure.   -Continue BiPAP PRN with weaning to home CPAP as able  -Continuous pulse ox    Concern for hepatic cirrhosis  Noted incidentally on CT PE is nodular contour of liver concerning for cirrhosis. Noted during previous CT scan March 2024 but no formal diagnosis prior to that. Could be due to chronic congestion from severe pulmonary HTN. LFTs mildly elevated in mixed pattern (AST 49, total bilirubin 2.9. Remainder WNL: ALT 18, alk phos 66).   -Hepatitis B surface antibody, core antibody, & surface antigen  -Hepatitis C antibody   -CMP in AM    Elevated d-dimer  D-dimer on presentation mildly elevated. History of PE. CT PE study 4/18/24 negative for acute PE. Lower extremity duplex US pending to rule out DVT in lower extremities.   -LE doppler US ordered    Obesity hypoventilation syndrome on CPAP  Morbid obesity  BMP >50. Dependent on CPAP for most of the day  "while in recliner. Recently prescribed Zepbound injection weekly for weight loss at PCP visit 4/17/24 but has not started yet. Initiated on BiPAP on presentation for acute respiratory distress. Discussed with RT, plan to wean to home CPAP settings ([id number]) as able.   -Continue home CPAP with stable home settings    Erythrocytosis  Mild, new. Hemoglobin baseline around 15.0, hemoglobin on presentation 16.0. Suspect hemoconcentration from decreased PO intake & increased diuresis.   -Gentle IVF 500mL lactated ringer over 5 hours  -CBC in AM    Hypertension  Normotensive on admission. Managed PTA with metoprolol succinate 25mg daily, continue with hold parameters.     Major depressive disorder   Generalized anxiety disorder  Stable. Managed PTA with sertraline 100mg daily, continue.     History of pulmonary embolism  History of right upper lobe PE discovered Feb 2022 in setting of acute covid infection, treated with xarelto which was stopped after 3 months treatment by pulmonology. Not currently on anticoagulation.     Multiple sclerosis   Very immobile. Does not follow with neurology. Not on any outpatient pharmacologic management. Symptoms stable.     Clinically Significant Risk Factors Present on Admission                # Drug Induced Platelet Defect: home medication list includes an antiplatelet medication   # Hypertension: Noted on problem list  # Acute heart failure with preserved ejection fraction: heart failure noted on problem list, last echo with EF >50%, and receiving IV diuretics     # Severe Obesity: Estimated body mass index is 54.28 kg/m  as calculated from the following:    Height as of 4/4/24: 1.626 m (5' 4\").    Weight as of 4/17/24: 143.5 kg (316 lb 4 oz).       # Financial/Environmental Concerns:            Diet:  regular  DVT Prophylaxis: Enoxaparin (Lovenox) SQ  Brar Catheter: Not present  Code Status:  full code  Lines: PIV    Disposition Plan      Expected Discharge Date: 04/20/2024         " "    Entered: Shahana Davila PA-C 04/18/2024, 7:24 AM     Status: Patient is appropriate for inpatient  Shahana Davila PA-C        The patient's care was discussed with the Attending Physician, Dr. Alex Porter, bedside RN, and the patient .    Primary Care Physician   Mikala Luna [phone]    History is obtained from the patient, who is a decent historian, handoff from ER provider, and review of old records via the EMR.    History of Present Illness   Bess Enamorado is a 48 year old female with past medical history of severe pulmonary hypertension with HFpEF, chronic oxygen & CPAP dependence, obesity hypoventilation syndrome, recent admission for cellulitis with recent completion of IV antibiotics, anxiety, hypertension, history of pulmonary embolism, eating disorder, previous NSTEMI now presents on 4/18/2024 with leg pain.     Patient was recently admitted for left lower extremity cellulitis secondary to staph lugdunensis treated with IV vancomycin completed 3/30/24. Patient felt like things were improving and her legs were nearly back to normal. She has chronic lymphedema as well as severe HFpEF with chronic pulmonary hypertension. Her diuretic was recently increased by cardiology, and she feels the edema in her legs has improved since starting diuretic.   Beginning 4/17 patient was more tired than usual and napped most of the day. Did not eat or drink very much. She woke up around 2am feeling \"off\", then again at 4am shaking & cold with increased leg pain. She attempted to get out of bed to use her bedside commode, and noticed significant increased left leg pain with standing. She began feeling her left leg and noticed it was warm & tender to the touch. Felt very similar to previous cellulitis infection. She denies any known trauma to the leg, but endorses an open sore that her home RN has been following closely. Also notes some increased moisture behind her knees which she is having trouble " controlling. Buttock crease has also become more irritated recently.   Patient is sedentary most of the time secondary to MS which affects mostly her lower extremities (symptoms currently stable, does not see neurology regularly), plus OHS requiring near-constant CPAP usage. When discussing other potential sources of infection, the patient admits to a broken tooth. Had a cap which fell off about 2 years ago, then about 8 months ago she noticed the tooth had broken. She denies any pain associated with this. Denies new bleeding or discharge or jaw pain in recent days or weeks. She denies any heart palpitations, chest pain or pressure. Denies lightheadedness or dizziness. Breathing feels at baseline. Denies worsening cough or pleuritic pain.     Upon arrival to ER patient received lab and imaging workup. Initiated on Piperacillin-tazobactam & vancomycin.     Review of Systems   Constitutional: denies weight loss  Eyes: denies changes in vision  HENT: see HPI  Respiratory: see HPI  Cardiovascular: see HPI  Gastroenterology: denies constipation, diarrhea, GERD symptoms  Genitourinary:see HPI  Integumentary: see HPI  Musculoskeletal: denies new muscle pain or joint trauma  Neuro: denies numbness, tingling, headaches, tremor  Psychiatric: denies significant changes to mood    Past Medical History     Obesity hypoventilation syndrome (H) 12/29/2023     Priority: Medium    Splenomegaly 04/06/2022     Priority: Medium    Renal insufficiency 04/06/2022     Priority: Medium    Chronic heart failure with preserved ejection fraction (H) 03/09/2022     Priority: Medium    History of pulmonary embolism 02/24/2022     Priority: Medium    NSTEMI (non-ST elevated myocardial infarction) (H) 02/09/2022     Priority: Medium    Lymphedema of both lower extremities 06/15/2020     Priority: Medium    Moderate episode of recurrent major depressive disorder (H) 03/23/2018     Priority: Medium    Benign essential hypertension 02/15/2016      Priority: Medium    Peroneal tendon rupture 11/23/2015     Priority: Medium    Class 3 severe obesity due to excess calories with serious comorbidity and body mass index (BMI) of 50.0 to 59.9 in adult (H) 08/24/2015     Priority: Medium    Eating disorder 08/25/2013     Priority: Medium    Leg edema 04/19/2013     Priority: Medium    Anxiety 06/28/2011     Priority: Medium    Restless legs 06/28/2011     Priority: Medium    CARDIOVASCULAR SCREENING; LDL GOAL LESS THAN 160 10/31/2010     Priority: Medium    Constipation 09/18/2006     Priority: Medium    Multiple sclerosis (H) 08/29/2005     Priority: Medium    Polycystic ovaries 08/29/2005     Priority: Medium      Past Surgical History   Past Surgical History:   Procedure Laterality Date    CHOLECYSTECTOMY, LAPOROSCOPIC      Cholecystectomy, Laparoscopic    COLONOSCOPY  2007?    Bathroom issue..results normal    COMBINED CYSTOSCOPY, RETROGRADES, EXCHANGE STENT URETER(S) Right 2/21/2022    Procedure: CYSTOSCOPY, WITH right RETROGRADE PYELOGRAM AND right URETERAL STENT PLACEMENT;  Surgeon: Doyle Palomares MD;  Location: [facility] OR    OPEN REDUCTION INTERNAL FIXATION TOE(S)  4/13/2012    Procedure:OPEN REDUCTION INTERNAL FIXATION TOE(S); Open reduction internal fixation right proximal fifth metatarsal fracture-   Anes-choice block; Surgeon:LEY, JEFFREY DUANE; Location:[facility] OR    PICC SINGLE LUMEN PLACEMENT  3/20/2024    PICC TRIPLE LUMEN PLACEMENT  2/21/2022           Prior to Admission Medications   Prior to Admission Medications   Prescriptions Last Dose Informant Patient Reported? Taking?   Furosemide (FUROSCIX) 80 MG/10ML CTKT   Yes No   Sig: Inject 80 mg Subcutaneous daily for 6 days   acetaminophen (TYLENOL) 650 MG CR tablet   Yes No   Sig: Take 650 mg by mouth every 8 hours as needed for mild pain or fever   aspirin (ASPIRIN LOW DOSE) 81 MG EC tablet   No No   Sig: Take 1 tablet (81 mg) by mouth daily   bumetanide (BUMEX) 2 MG tablet   No No   Sig: Take 2  tablets (4 mg) by mouth 2 times daily   metoprolol succinate ER (TOPROL XL) 25 MG 24 hr tablet   No No   Sig: Take 1 tablet (25 mg) by mouth daily   miconazole (MICATIN) 2 % AERP powder   No No   Sig: Apply topically 2 times daily as needed   potassium chloride sheila ER (KLOR-CON M20) 20 MEQ CR tablet   No No   Sig: Take 1 tablet (20 mEq) by mouth 2 times daily   sertraline (ZOLOFT) 100 MG tablet   No No   Sig: Take 1 tablet (100 mg) by mouth daily   tirzepatide-Weight Management (ZEPBOUND) 2.5 MG/0.5ML prefilled pen   No No   Sig: Inject 0.5 mLs (2.5 mg) Subcutaneous every 7 days      Facility-Administered Medications: None     Allergies   Allergies   Allergen Reactions    Nkda [No Known Drug Allergy]      Family History    Family History   Problem Relation Age of Onset    Neurologic Disorder Father         MS    Heart Disease Father         irregular heart rate    Diabetes Father     Melanoma Father     Sleep Apnea Father     Other Cancer Father     Cancer Maternal Grandfather         lung    Other Cancer Maternal Grandfather     Cancer Paternal Grandmother         stomach    Other Cancer Paternal Grandmother     Cancer Mother     Other Cancer Mother     Thyroid Disease Mother     Gynecology Maternal Aunt         PCOS    Hypertension Maternal Grandmother     Anxiety Disorder Maternal Grandmother     Thyroid Disease Maternal Grandmother     Anxiety Disorder Sister      Social History   Social History     Socioeconomic History    Marital status: Single     Physical Exam   /77   Pulse 104   Temp 100.4  F (38  C) (Tympanic)   Resp 20   LMP  (LMP Unknown)   SpO2 95%      Weight: 0 lbs 0 oz There is no height or weight on file to calculate BMI.     Constitutional: Alert, oriented, cooperative, appears uncomfortable but nontoxic  Eyes: Eyes are clear, pupils are reactive  HENT: Oropharynx is clear, dry. No excoriations. Tooth breakdown is nonpainful. No evidence of cranial trauma.    Cardiovascular: Regular  rate and rhythm, normal S1 and S2, S3 gallop noted LUSB, LLSB, apex. JVP is normal. Good peripheral pulses in wrists bilaterally. Significant chronic bilateral LE  Respiratory: diminished lung sounds throughout. No wheezing, rhonchi, or crackles. On BiPAP.   GI: Soft, non-tender, normal bowel sounds  Genitourinary: minimal dark yellow clear urine in suction container  Musculoskeletal: No obvious joint deformities.   Skin: Significant bilateral LE edema with chronic lymphedema changes and skin breakdown in posterior knees.          Neurologic: Neck supple. Cranial nerves are grossly intact.  is symmetric. Oriented & interacting appropriately.     Data   Data reviewed today:   Recent Labs   Lab 04/18/24  0510 04/18/24  0456 04/17/24  0924   WBC  --  7.8  --    HGB  --  16.0*  --    MCV  --  86  --    PLT  --  164  --      --  137   POTASSIUM 4.9  --  4.0   CHLORIDE 100  --  100   CO2 24  --  28   BUN 17.4  --  18.4   CR 0.66  --  0.72   ANIONGAP 13  --  9   EVITA 10.0  --  9.9   *  --  87   ALBUMIN 3.9  --   --    PROTTOTAL 8.5*  --   --    BILITOTAL 2.9*  --   --    ALKPHOS 66  --   --    ALT 18  --   --    AST 49*  --   --      Recent Results (from the past 24 hour(s))   XR Chest Port 1 View    Narrative    EXAM: XR CHEST PORT 1 VIEW  LOCATION: [facility]  DATE: 4/18/2024    INDICATION: Hypoxic respiratory failure  COMPARISON: 03/16/2024      Impression    IMPRESSION: Enlarged cardiac silhouette, stable. Central vascular congestion. No edema, consolidation, pleural effusion, or pneumothorax.   CT Chest Pulmonary Embolism w Contrast    Narrative    EXAM: CT CHEST PULMONARY EMBOLISM W CONTRAST  LOCATION: [facility]  DATE: 4/18/2024    INDICATION: Hypoxic respiratory failure, elevated D dimer  COMPARISON: Chest radiograph today. CTA chest 03/16/2024.  TECHNIQUE: CT chest pulmonary angiogram during arterial phase injection of IV contrast. Multiplanar  reformats and MIP reconstructions were performed. Dose reduction techniques were used.   CONTRAST: 96 mL Isovue 370    FINDINGS:  ANGIOGRAM CHEST: No pulmonary embolus. Chronic large size of the pulmonary arterial tree consistent with pulmonary arterial hypertension. No aortic aneurysm or dissection.    LUNGS AND PLEURA: Normal.    MEDIASTINUM/AXILLAE: Calcification of the mitral annulus.    CORONARY ARTERY CALCIFICATION: None.    UPPER ABDOMEN: Postcholecystectomy. Nodular contour of the liver concerning for cirrhosis.    MUSCULOSKELETAL: Degenerative changes of the spine.      Impression    IMPRESSION:  1.  No pulmonary embolus or other acute process in the chest.  2.  Large size of the pulmonary arterial tree consistent with pulmonary arterial hypertension.  3.  Hepatic cirrhosis.   POC US CHEST B-SCAN    [facility] Procedure Note      Limited Bedside ED Ultrasound of Thorax:    PROCEDURE: PERFORMED BY: Dr. Teja Monet MD  INDICATIONS/SYMPTOM:  shortness of breath and dyspnea  PROBE: High frequency linear probe  BODY LOCATION: Chest  FINDINGS:  Images of both lung hemithoracies taken in 2D in multiple rib spaces        Right side:  Lung sliding artifact  Present     Comet tail artifacts  Absent   Left side:  Lung sliding artifact  Present     Comet tail artifacts  Absent       INTERPRETATION: The exam was normal. There was no free fluid identified in the chest cavity. No evidence of pneumothorax.  IMAGE DOCUMENTATION: Unable to save ultrasound images as the ultrasound would not connect to the network.

## 2024-04-18 NOTE — PROGRESS NOTES
Pt on Bipap 14/7 35% , tolerating well. Spo2  =95% , Sats drop when off Bipap. Plan is transition to her home CPAP when family brings her home unit in tonight.

## 2024-04-18 NOTE — ED NOTES
Hospitalist had removed BiPap for pt for pt to have some ice chips, in to reapply BiPap SpO2 83% on RA, improved when BiPap replaced. Pt has not c/o, bed pending for admission.

## 2024-04-18 NOTE — ED PROVIDER NOTES
History     Chief Complaint   Patient presents with    Shortness of Breath     HPI  Bess Enamorado is a 48 year old female who has multiple sclerosis, morbid obesity, hypertension, NARCISA, depression, diastolic heart failure, coronary artery disease, history of PE not on anticoagulation, history of left leg cellulitis who presents to the emergency department via EMS for evaluation of leg pain and shortness of breath.  Patient states that she was in her normal state of health until about 2 AM when she woke up this morning and was having pain in her left lower extremity.  She also felt short of breath.  When medics arrived, they found the patient with oxygen saturations in the 40s and she was cyanotic.  They placed her on 10 L nonrebreather and oxygen durations came up to the 80s.  Patient has been alert and oriented the entire time.  She denies chest pain.  She denies fever.  No abdominal pain.  States she has been compliant with her medications.  She states she thinks she has an infection in her left leg again.    Patient was admitted to [facility] in March for sepsis secondary to left lower extremity cellulitis.  She had completed a course of IV vancomycin.  Blood cultures at that time were positive for Staphylococcus lugdunensis.  There was also some concern for pulmonary edema during that hospitalization.  Echocardiogram showed normal systolic function with ejection fraction of 65%.  She had a severely dilated right ventricle with severe pulmonary hypertension.  CT PE study was negative for PE and lower extremity ultrasounds were negative for DVT.    Allergies:  Allergies   Allergen Reactions    Nkda [No Known Drug Allergy]        Problem List:    Patient Active Problem List    Diagnosis Date Noted    Pulmonary hypertension (H) 04/18/2024     Priority: Medium    Acute and chronic respiratory failure with hypoxia (H) 04/18/2024     Priority: Medium    Sepsis due to cellulitis (H) 04/18/2024     Priority:  Medium    Morbid obesity (H) 03/16/2024     Priority: Medium    Lower GI bleed 03/16/2024     Priority: Medium    Prolonged Q-T interval on ECG 03/16/2024     Priority: Medium    Left leg cellulitis 03/16/2024     Priority: Medium    Acute on chronic respiratory failure with hypoxemia (H) 03/16/2024     Priority: Medium    Acute on chronic congestive heart failure, unspecified heart failure type (H) 03/16/2024     Priority: Medium    Sepsis, due to unspecified organism, unspecified whether acute organ dysfunction present (H) 03/16/2024     Priority: Medium    Obesity hypoventilation syndrome (H) 12/29/2023     Priority: Medium    Acute on chronic respiratory failure with hypoxia and hypercapnia (H) 12/26/2023     Priority: Medium    Pyelonephritis, acute 12/21/2023     Priority: Medium    Cellulitis of lower extremity, unspecified laterality 12/21/2023     Priority: Medium    Hypokalemia 05/04/2022     Priority: Medium    Recurrent UTI's 05/03/2022     Priority: Medium    Splenomegaly 04/06/2022     Priority: Medium    Hyponatremia 04/06/2022     Priority: Medium    Renal insufficiency 04/06/2022     Priority: Medium    Chronic heart failure with preserved ejection fraction (H) 03/09/2022     Priority: Medium    Ureteral stone 02/27/2022     Priority: Medium    Urinary tract infection 02/27/2022     Priority: Medium    Hydronephrosis with urinary obstruction due to ureteral calculus 02/27/2022     Priority: Medium    History of pulmonary embolism 02/24/2022     Priority: Medium    Dehydration 02/21/2022     Priority: Medium    Hyperkalemia 02/21/2022     Priority: Medium    Acute renal failure, unspecified acute renal failure type (H24) 02/21/2022     Priority: Medium    Hypotension due to hypovolemia 02/21/2022     Priority: Medium    Acute on chronic diastolic congestive heart failure (H) 02/09/2022     Priority: Medium    NSTEMI (non-ST elevated myocardial infarction) (H) 02/09/2022     Priority: Medium    Acute  pulmonary embolism without acute cor pulmonale, unspecified pulmonary embolism type (H) 02/08/2022     Priority: Medium    Tachycardia 03/04/2021     Priority: Medium    Cellulitis of right leg 03/04/2021     Priority: Medium    Lymphedema of both lower extremities 06/15/2020     Priority: Medium    Generalized anxiety disorder 03/23/2020     Priority: Medium    History of abnormal cervical Pap smear 08/26/2019     Priority: Medium    Sepsis (Temp 101, , WBC 13.0) 10/23/2018     Priority: Medium    Moderate episode of recurrent major depressive disorder (H) 03/23/2018     Priority: Medium    NARCISA on CPAP 08/22/2017     Priority: Medium     Severe NARCISA with sleep-associated hypoxemia, with likely REM-related hypoventilation  Polysomnography - Test date 8/9/2017  AHI 45.9, basline SpO2 92.9%, mike SpO2 54.9%, time of SpO2 <= 88% of 31.7 minutes.  Severe desaturations and sustained hypoxemia confined to singular supine REM period (no lateral REM observed for comparison).  Also seen to have TCM increase by ~15 mmHg over baseline in supine REM, consistent with hypoventilation.  Pre-study VBG was WNL.  CPAP titrated to 10 cm H2O and appeared optimal, including supine REM, with normalization of SpO2 and TCM normalized to low-40's.  Seen to have frequent PLM's (64.3 / hour on diagnostic, 89.6 / hour on treatment, ~20% associated with cortical arousals).        Benign essential hypertension 02/15/2016     Priority: Medium    Peroneal tendon rupture 11/23/2015     Priority: Medium    Class 3 severe obesity due to excess calories with serious comorbidity and body mass index (BMI) of 50.0 to 59.9 in adult (H) 08/24/2015     Priority: Medium     Oct 2016:  Starting [employer] program in [place], goal to lose 140 lbs over next 2 years      Papanicolaou smear of cervix with low grade squamous intraepithelial lesion (LGSIL) 08/03/2014     Priority: Medium     7/29/2014:Pap--LSIL. Neg high risk HPV. Plan cotest in 1 year (if  this is ASCUS or LSIL then colp). In reminders  8/20/15: Pap - ASCUS, Neg HPV. Plan colp  9/14/15: [place] Bx & ECC - negative. Plan cotest in 1 year.   10/20/16: NIL Pap, Neg HPV. Plan cotest in 3 years.       Eating disorder 08/25/2013     Priority: Medium     Binge-eating disorder; social phobia  See psychological assessment from the [facility], Dr. Anupama Bowie, 8/14/2013  Problem list name updated by automated process. Provider to review      Leg edema 04/19/2013     Priority: Medium    Anxiety 06/28/2011     Priority: Medium     Followed by neurologist      Restless legs 06/28/2011     Priority: Medium    CARDIOVASCULAR SCREENING; LDL GOAL LESS THAN 160 10/31/2010     Priority: Medium    Constipation 09/18/2006     Priority: Medium     9/18/2006    Has tried otc suppository and otc lax.   Problem list name updated by automated process. Provider to review      Multiple sclerosis (H) 08/29/2005     Priority: Medium     9/18/200pt dx with MS 2004--dx by neurolgist and is followed by Dr. Jeet Nicholson, also MS nurse Heather Thomas      Polycystic ovaries 08/29/2005     Priority: Medium     Diagnosed in [place], had facial hair, overweight, carb craving, records being requested, never on metformin          Past Medical History:    Past Medical History:   Diagnosis Date    Acute on chronic diastolic congestive heart failure (H) 02/09/2022    Arthritis 2019    ASCUS favor benign 08/2015    Depressive disorder 2010    H/O colposcopy with cervical biopsy 09/14/2015    Hypertension 2019    LSIL on Pap smear 07/2014    Multiple sclerosis (H) 08/29/2005    Myocardial infarction (H)     Obese     Pneumonia due to infectious organism, unspecified laterality, unspecified part of lung 02/08/2022    Shingles 10/2016    SIRS (systemic inflammatory response syndrome) (H) 03/04/2021    Sleep apnea     UNSPEC CONSTIPATION 09/18/2006       Past Surgical History:    Past Surgical History:   Procedure Laterality Date     CHOLECYSTECTOMY, LAPOROSCOPIC      Cholecystectomy, Laparoscopic    COLONOSCOPY  2007?    Bathroom issue..results normal    COMBINED CYSTOSCOPY, RETROGRADES, EXCHANGE STENT URETER(S) Right 2/21/2022    Procedure: CYSTOSCOPY, WITH right RETROGRADE PYELOGRAM AND right URETERAL STENT PLACEMENT;  Surgeon: Doyle Palomares MD;  Location: [facility] OR    OPEN REDUCTION INTERNAL FIXATION TOE(S)  4/13/2012    Procedure:OPEN REDUCTION INTERNAL FIXATION TOE(S); Open reduction internal fixation right proximal fifth metatarsal fracture-   Anes-choice block; Surgeon:LEY, JEFFREY DUANE; Location:[facility] OR    PICC SINGLE LUMEN PLACEMENT  3/20/2024    PICC TRIPLE LUMEN PLACEMENT  2/21/2022            Family History:    Family History   Problem Relation Age of Onset    Neurologic Disorder Father         MS    Heart Disease Father         irregular heart rate    Diabetes Father     Melanoma Father     Sleep Apnea Father     Other Cancer Father     Cancer Maternal Grandfather         lung    Other Cancer Maternal Grandfather     Cancer Paternal Grandmother         stomach    Other Cancer Paternal Grandmother     Cancer Mother     Other Cancer Mother     Thyroid Disease Mother     Gynecology Maternal Aunt         PCOS    Hypertension Maternal Grandmother     Anxiety Disorder Maternal Grandmother     Thyroid Disease Maternal Grandmother     Anxiety Disorder Sister        Social History:  Marital Status:  Single [1]  Social History     Tobacco Use    Smoking status: Never    Smokeless tobacco: Never   Vaping Use    Vaping status: Never Used   Substance Use Topics    Alcohol use: Yes     Comment: social    Drug use: No        Medications:    acetaminophen (TYLENOL) 650 MG CR tablet  aspirin (ASPIRIN LOW DOSE) 81 MG EC tablet  bumetanide (BUMEX) 2 MG tablet  Furosemide (FUROSCIX) 80 MG/10ML CTKT  metoprolol succinate ER (TOPROL XL) 25 MG 24 hr tablet  miconazole (MICATIN) 2 % AERP powder  potassium chloride sheila ER (KLOR-CON M20) 20 MEQ CR  tablet  sertraline (ZOLOFT) 100 MG tablet  tirzepatide-Weight Management (ZEPBOUND) 2.5 MG/0.5ML prefilled pen          Review of Systems  See HPI  Physical Exam   BP: (!) 158/105  Pulse: 103  Temp: 100.4  F (38  C)  Resp: (!) 6  SpO2: 91 %      Physical Exam  /79   Pulse 105   Temp 100.4  F (38  C) (Tympanic)   Resp 17   LMP  (LMP Unknown)   SpO2 96%   General: Distressed, but speaking in full sentences.  Head: atraumatic  Nose: no rhinorrhea or epistaxis  Ears: no external auditory canal discharge or bleeding.    Eyes: Sclera nonicteric. Conjunctiva noninjected. PERRL, EOMI  Mouth: Perioral cyanosis.  Moist mucous membranes  Neck: supple, moving spontaneously no midline cervical tenderness  Lungs: Tachypneic, but able to speak in full sentences.  Lungs are clear to auscultation bilaterally.  CV: Tachycardic, peripheral cyanosis  Abdomen: soft, nt, nd, no guarding or rebound.   Extremities: Bilateral diffusely edematous lower extremities.  There is edema bilaterally, but worse on the left.  Area of erythema over the medial left leg is warm and tender to palpation.  There is no crepitus.  No open wounds.  Skin: no rash or diaphoresis  Neuro: CN II-XII grossly intact, moving all extremities, following commands    ED Course        Procedures    Results for orders placed during the hospital encounter of 04/18/24    POC US CHEST B-SCAN    Narrative  [facility] Procedure Note    Limited Bedside ED Ultrasound of Thorax:    PROCEDURE: PERFORMED BY: Dr. Teja Monet MD  INDICATIONS/SYMPTOM:  shortness of breath and dyspnea  PROBE: High frequency linear probe  BODY LOCATION: Chest  FINDINGS:  Images of both lung hemithoracies taken in 2D in multiple rib spaces    Right side:  Lung sliding artifact  Present  Comet tail artifacts  Absent  Left side:  Lung sliding artifact  Present  Comet tail artifacts  Absent      INTERPRETATION: The exam was normal. There was no free fluid identified in the chest cavity. No  evidence of pneumothorax.  IMAGE DOCUMENTATION: Unable to save ultrasound images as the ultrasound would not connect to the network.            EKG Interpretation:      Interpreted by Teja Monet MD  Time reviewed: 6:22 AM    Symptoms at time of EKG: Shortness of breath and leg pain  Rhythm: sinus tachycardia  Rate: 100-110  Axis: Normal  Ectopy: none  Conduction: right bundle branch block (incomplete)  ST Segments/ T Waves: No acute ischemic changes  Q Waves: none  Comparison to prior: Unchanged    Clinical Impression: no acute changes            Critical Care time:  was 30 minutes for this patient excluding procedures.         Results for orders placed or performed during the hospital encounter of 04/18/24 (from the past 24 hour(s))   Pitcher Draw    Narrative    The following orders were created for panel order Pitcher Draw.  Procedure                               Abnormality         Status                     ---------                               -----------         ------                     Extra Blue Top Tube[[id number]]                              Final result               Extra Red Top Tube[[id number]]                               Final result               Extra Green Top (Lithium...[[id number]]                                                 Extra Purple Top Tube[[id number]]                                                       Extra Heparinized Syringe[[id number]]                                                     Please view results for these tests on the individual orders.   CBC with platelets, differential    Narrative    The following orders were created for panel order CBC with platelets, differential.  Procedure                               Abnormality         Status                     ---------                               -----------         ------                     CBC with platelets and d...[[id number]]  Abnormal            Final result                 Please view results for these tests  on the individual orders.   Lactic acid whole blood   Result Value Ref Range    Lactic Acid 2.6 (H) 0.7 - 2.0 mmol/L   Blood gas venous   Result Value Ref Range    pH Venous 7.39 7.32 - 7.43    pCO2 Venous 45 40 - 50 mm Hg    pO2 Venous 19 (L) 25 - 47 mm Hg    Bicarbonate Venous 27 21 - 28 mmol/L    Base Excess/Deficit Venous 1.8 -3.0 - 3.0 mmol/L    FIO2 80     Oxyhemoglobin Venous 28 (L) 70 - 75 %    O2 Sat, Venous 28.5 (L) 70.0 - 75.0 %    Narrative    In healthy individuals, oxyhemoglobin (O2Hb) and oxygen saturation (SO2) are approximately equal. In the presence of dyshemoglobins, oxyhemoglobin can be considerably lower than oxygen saturation.   Extra Blue Top Tube   Result Value Ref Range    Hold Specimen JIC    Extra Red Top Tube   Result Value Ref Range    Hold Specimen JIC    CBC with platelets and differential   Result Value Ref Range    WBC Count 7.8 4.0 - 11.0 10e3/uL    RBC Count 5.78 (H) 3.80 - 5.20 10e6/uL    Hemoglobin 16.0 (H) 11.7 - 15.7 g/dL    Hematocrit 49.8 (H) 35.0 - 47.0 %    MCV 86 78 - 100 fL    MCH 27.7 26.5 - 33.0 pg    MCHC 32.1 31.5 - 36.5 g/dL    RDW 18.5 (H) 10.0 - 15.0 %    Platelet Count 164 150 - 450 10e3/uL    % Neutrophils 80 %    % Lymphocytes 14 %    % Monocytes 4 %    % Eosinophils 2 %    % Basophils 1 %    % Immature Granulocytes 0 %    NRBCs per 100 WBC 0 <1 /100    Absolute Neutrophils 6.2 1.6 - 8.3 10e3/uL    Absolute Lymphocytes 1.1 0.8 - 5.3 10e3/uL    Absolute Monocytes 0.3 0.0 - 1.3 10e3/uL    Absolute Eosinophils 0.1 0.0 - 0.7 10e3/uL    Absolute Basophils 0.1 0.0 - 0.2 10e3/uL    Absolute Immature Granulocytes 0.0 <=0.4 10e3/uL    Absolute NRBCs 0.0 10e3/uL   D dimer quantitative   Result Value Ref Range    D-Dimer Quantitative 0.65 (H) 0.00 - 0.50 ug/mL FEU    Narrative    This D-dimer assay is intended for use in conjunction with a clinical pretest probability assessment model to exclude pulmonary embolism (PE) and deep venous thrombosis (DVT) in outpatients  suspected of PE or DVT. The cut-off value is 0.50 ug/mL FEU.   Comprehensive metabolic panel   Result Value Ref Range    Sodium 137 135 - 145 mmol/L    Potassium 4.9 3.4 - 5.3 mmol/L    Carbon Dioxide (CO2) 24 22 - 29 mmol/L    Anion Gap 13 7 - 15 mmol/L    Urea Nitrogen 17.4 6.0 - 20.0 mg/dL    Creatinine 0.66 0.51 - 0.95 mg/dL    GFR Estimate >90 >60 mL/min/1.73m2    Calcium 10.0 8.6 - 10.0 mg/dL    Chloride 100 98 - 107 mmol/L    Glucose 107 (H) 70 - 99 mg/dL    Alkaline Phosphatase 66 40 - 150 U/L    AST 49 (H) 0 - 45 U/L    ALT 18 0 - 50 U/L    Protein Total 8.5 (H) 6.4 - 8.3 g/dL    Albumin 3.9 3.5 - 5.2 g/dL    Bilirubin Total 2.9 (H) <=1.2 mg/dL   Procalcitonin   Result Value Ref Range    Procalcitonin 0.10 <0.50 ng/mL   CRP inflammation   Result Value Ref Range    CRP Inflammation 6.71 (H) <5.00 mg/L   Troponin T, High Sensitivity   Result Value Ref Range    Troponin T, High Sensitivity 34 (H) <=14 ng/L   Nt probnp inpatient (BNP)   Result Value Ref Range    N terminal Pro BNP Inpatient 1,602 (H) 0 - 450 pg/mL   XR Chest Port 1 View    Narrative    EXAM: XR CHEST PORT 1 VIEW  LOCATION: [facility]  DATE: 4/18/2024    INDICATION: Hypoxic respiratory failure  COMPARISON: 03/16/2024      Impression    IMPRESSION: Enlarged cardiac silhouette, stable. Central vascular congestion. No edema, consolidation, pleural effusion, or pneumothorax.   CT Chest Pulmonary Embolism w Contrast    Narrative    EXAM: CT CHEST PULMONARY EMBOLISM W CONTRAST  LOCATION: [facility]  DATE: 4/18/2024    INDICATION: Hypoxic respiratory failure, elevated D dimer  COMPARISON: Chest radiograph today. CTA chest 03/16/2024.  TECHNIQUE: CT chest pulmonary angiogram during arterial phase injection of IV contrast. Multiplanar reformats and MIP reconstructions were performed. Dose reduction techniques were used.   CONTRAST: 96 mL Isovue 370    FINDINGS:  ANGIOGRAM CHEST: No pulmonary  embolus. Chronic large size of the pulmonary arterial tree consistent with pulmonary arterial hypertension. No aortic aneurysm or dissection.    LUNGS AND PLEURA: Normal.    MEDIASTINUM/AXILLAE: Calcification of the mitral annulus.    CORONARY ARTERY CALCIFICATION: None.    UPPER ABDOMEN: Postcholecystectomy. Nodular contour of the liver concerning for cirrhosis.    MUSCULOSKELETAL: Degenerative changes of the spine.      Impression    IMPRESSION:  1.  No pulmonary embolus or other acute process in the chest.  2.  Large size of the pulmonary arterial tree consistent with pulmonary arterial hypertension.  3.  Hepatic cirrhosis.   POC US CHEST B-SCAN    Narrative    [facility] Procedure Note      Limited Bedside ED Ultrasound of Thorax:    PROCEDURE: PERFORMED BY: Dr. Teja Monet MD  INDICATIONS/SYMPTOM:  shortness of breath and dyspnea  PROBE: High frequency linear probe  BODY LOCATION: Chest  FINDINGS:  Images of both lung hemithoracies taken in 2D in multiple rib spaces        Right side:  Lung sliding artifact  Present     Comet tail artifacts  Absent   Left side:  Lung sliding artifact  Present     Comet tail artifacts  Absent       INTERPRETATION: The exam was normal. There was no free fluid identified in the chest cavity. No evidence of pneumothorax.  IMAGE DOCUMENTATION: Unable to save ultrasound images as the ultrasound would not connect to the network.             Medications   nitroGLYcerin (NITROSTAT) sublingual tablet 0.4 mg (has no administration in time range)   piperacillin-tazobactam (ZOSYN) 4.5 g vial to attach to  mL bag (4.5 g Intravenous $New Bag 4/18/24 0833)   vancomycin (VANCOCIN) 1,500 mg in sodium chloride 0.9 % 250 mL intermittent infusion (1,500 mg Intravenous $New Bag 4/18/24 5477)   nitroGLYcerin (NITROSTAT) 0.4 MG sublingual tablet (  $Given 4/18/24 5760)   iopamidol (ISOVUE-370) solution 96 mL (96 mLs Intravenous $Given 4/18/24 9373)   CT Saline (100 mLs Intravenous $Given  4/18/24 0557)   bumetanide (BUMEX) injection 1 mg (1 mg Intravenous $Given 4/18/24 9541)       Assessments & Plan (with Medical Decision Making)     I have reviewed the nursing notes.    I have reviewed the findings, diagnosis, plan and need for follow up with the patient.       Critical Care Addendum  My initial assessment, based on my review of prehospital provider report, review of nursing observations, review of vital signs, focused history, physical exam, review of cardiac rhythm monitor, 12 lead ECG analysis, and interpretation of chronic care chest ultrasound , established a high suspicion that Bess Enamorado has respiratory distress, which requires immediate intervention, and therefore she is critically ill.     After the initial assessment, the care team initiated multiple lab tests, initiated medication therapy with sublingual nitro, broad-spectrum antibiotics, Bumex, and initiated intensive non-invasive respiratory support to provide stabilization care. Due to the critical nature of this patient, I reassessed nursing observations, vital signs, physical exam, review of cardiac rhythm monitor, and respiratory status multiple times prior to her disposition.     Time also spent performing documentation, reviewing test results, and coordination of care.     Critical care time (excluding teaching time and procedures): 30 minutes.         Medical Decision Making  Neal Enamorado is a 48 year old female who has multiple sclerosis, morbid obesity, hypertension, NARCISA, depression, diastolic heart failure, coronary artery disease, history of PE not on anticoagulation, history of left leg cellulitis who presents to the emergency department via EMS for evaluation of leg pain and shortness of breath.  Vital signs reviewed and notable for hypertension, tachycardia, hypoxia and fever.  Patient was seen immediately upon arrival.  Patient dropped her sats to the 70s when she was taken off of the nonrebreather.  She was placed  directly on BiPAP with significant improvement.  Oxygen saturation came up to the 90s.  Patient's cyanosis improved.  Her respiratory distress improved rapidly.  Initially was concern for flash pulmonary edema given the hypertension and tachypnea and hypoxia.  I did give her sublingual nitro.  Unclear if this or the BiPAP is what helped.  However, her lung sounds are essentially normal and no B-lines on ultrasound would argue against flash pulmonary edema.  Given her rapid improvement on the BiPAP, I do not think she required high-dose nitro drip.  Portable chest x-ray obtained.  Negative for pneumothorax, consolidation, effusion.  She is noted to have vascular congestion given the fever, also concern for sepsis so broad-spectrum antibiotics were ordered with vancomycin and Zosyn.  For source, suspect skin secondary to left lower extremity cellulitis.  Low suspicion for necrotizing fasciitis.  Blood cultures were obtained prior to antibiotics.  Lactate was slightly elevated 2.6.  I will hold off on fluids for now given her history of CHF.  I suspect the elevated lactate was more due to the hypoxia.  I did give her a dose of Bumex.  Her labs are otherwise relatively reassuring.  Troponin is stable.  EKG is reassuring.  BNP is actually significantly improved from previous.  Her CMP is reassuring.  VBG reassuring.  CBC reassuring.  D-dimer was slightly elevated, so proceeded with CT PE study.  CT is negative for pulmonary embolism.  She does have signs of pulmonary hypertension.  Patient was reassessed and appears much more comfortable.  Vitals are stable at this time.  She is no longer cyanotic.  I suspect that she has sepsis secondary to her lower extremity cellulitis and the inflammatory response has triggered her respiratory distress.  Patient will need to be admitted to the ICU.  Accepted for admission by Dr. Porter.        New Prescriptions    No medications on file       Final diagnoses:   Sepsis due to  cellulitis (H)   Acute and chronic respiratory failure with hypoxia (H)   Pulmonary hypertension (H)       4/18/2024   [facility] EMERGENCY DEPT       Teja Monet MD  04/18/24 0719

## 2024-04-18 NOTE — ED NOTES
[facility]   Admission Handoff    The patient is Bess Enamorado, 48 year old who arrived in the ED by AMBULANCE from home with a complaint of Shortness of Breath  . The patient's current symptoms are new and during this time the symptoms have decreased. In the ED, patient was diagnosed with   Final diagnoses:   Sepsis due to cellulitis (H)   Acute and chronic respiratory failure with hypoxia (H)   Pulmonary hypertension (H)         Needed?: No    Allergies:    Allergies   Allergen Reactions    Nkda [No Known Drug Allergy]        Past Medical Hx:   Past Medical History:   Diagnosis Date    Acute on chronic diastolic congestive heart failure (H) 02/09/2022    Arthritis 2019    Hips    ASCUS favor benign 08/2015    Neg HPV    Depressive disorder 2010    H/O colposcopy with cervical biopsy 09/14/2015    Bx & ECC - negative    Hypertension 2019    LSIL on Pap smear 07/2014    Neg high risk HPV    Multiple sclerosis (H) 08/29/2005 9/18/200pt dx with MS 2004--dx by neurolgist and is followed by Dr. Harris    Myocardial infarction (H)     Obese     Pneumonia due to infectious organism, unspecified laterality, unspecified part of lung 02/08/2022    Shingles 10/2016    SIRS (systemic inflammatory response syndrome) (H) 03/04/2021    Sleep apnea     UNSPEC CONSTIPATION 09/18/2006 9/18/2006   Has tried otc suppository and otc lax.        Initial vitals were: BP: (!) 158/105  Pulse: 103  Temp: 100.4  F (38  C)  Resp: (!) 6  SpO2: 91 %   Recent vital Signs: /77   Pulse 104   Temp 100.4  F (38  C) (Tympanic)   Resp 20   LMP  (LMP Unknown)   SpO2 95%     Elimination Status: Continent: Purewick.      Activity Level: 2 assist    Fall Status: Reason for falls risk:  Mobility and Reason for falls risk: Elimination  bed/chair alarm on, nonskid shoes/slippers when out of bed, arm band in place, patient and family education, assistive device/personal items within reach, activity  supervised, and mobility aid in reach    Baseline Mental status: WDL  Current Mental Status changes: at basesline    Infection present or suspected this encounter: yes skin/wound/contact and cultures pending  Sepsis suspected: Yes    Isolation type: NA.     Bariatric equipment needed?: No    In the ED these meds were given:   Medications   nitroGLYcerin (NITROSTAT) sublingual tablet 0.4 mg (has no administration in time range)   piperacillin-tazobactam (ZOSYN) 4.5 g vial to attach to  mL bag (4.5 g Intravenous $New Bag 4/18/24 0514)   vancomycin (VANCOCIN) 1,500 mg in sodium chloride 0.9 % 250 mL intermittent infusion (1,500 mg Intravenous $New Bag 4/18/24 0514)   nitroGLYcerin (NITROSTAT) 0.4 MG sublingual tablet (  $Given 4/18/24 8185)   iopamidol (ISOVUE-370) solution 96 mL (96 mLs Intravenous $Given 4/18/24 0557)   CT Saline (100 mLs Intravenous $Given 4/18/24 0557)   bumetanide (BUMEX) injection 1 mg (1 mg Intravenous $Given 4/18/24 0610)       Drips running?  No    Home pump  No    Current LDAs: Peripheral IV: Site Right AC; Gauge 18  normal saline     Results:   Labs/Imaging  Ordered and Resulted from Time of ED Arrival Up to the Time of Departure from the ED  Results for orders placed or performed during the hospital encounter of 04/18/24 (from the past 24 hour(s))   [place] Draw    Narrative    The following orders were created for panel order [place] Draw.  Procedure                               Abnormality         Status                     ---------                               -----------         ------                     Extra Blue Top Tube[[id number]]                              Final result               Extra Red Top Tube[[id number]]                               Final result               Extra Green Top (Lithium...[[id number]]                                                 Extra Purple Top Tube[[id number]]                                                       Extra Heparinized  Syringe[[id number]]                                                     Please view results for these tests on the individual orders.   CBC with platelets, differential    Narrative    The following orders were created for panel order CBC with platelets, differential.  Procedure                               Abnormality         Status                     ---------                               -----------         ------                     CBC with platelets and d...[[id number]]  Abnormal            Final result                 Please view results for these tests on the individual orders.   Lactic acid whole blood   Result Value Ref Range    Lactic Acid 2.6 (H) 0.7 - 2.0 mmol/L   Blood gas venous   Result Value Ref Range    pH Venous 7.39 7.32 - 7.43    pCO2 Venous 45 40 - 50 mm Hg    pO2 Venous 19 (L) 25 - 47 mm Hg    Bicarbonate Venous 27 21 - 28 mmol/L    Base Excess/Deficit Venous 1.8 -3.0 - 3.0 mmol/L    FIO2 80     Oxyhemoglobin Venous 28 (L) 70 - 75 %    O2 Sat, Venous 28.5 (L) 70.0 - 75.0 %    Narrative    In healthy individuals, oxyhemoglobin (O2Hb) and oxygen saturation (SO2) are approximately equal. In the presence of dyshemoglobins, oxyhemoglobin can be considerably lower than oxygen saturation.   Extra Blue Top Tube   Result Value Ref Range    Hold Specimen JIC    Extra Red Top Tube   Result Value Ref Range    Hold Specimen JIC    CBC with platelets and differential   Result Value Ref Range    WBC Count 7.8 4.0 - 11.0 10e3/uL    RBC Count 5.78 (H) 3.80 - 5.20 10e6/uL    Hemoglobin 16.0 (H) 11.7 - 15.7 g/dL    Hematocrit 49.8 (H) 35.0 - 47.0 %    MCV 86 78 - 100 fL    MCH 27.7 26.5 - 33.0 pg    MCHC 32.1 31.5 - 36.5 g/dL    RDW 18.5 (H) 10.0 - 15.0 %    Platelet Count 164 150 - 450 10e3/uL    % Neutrophils 80 %    % Lymphocytes 14 %    % Monocytes 4 %    % Eosinophils 2 %    % Basophils 1 %    % Immature Granulocytes 0 %    NRBCs per 100 WBC 0 <1 /100    Absolute Neutrophils 6.2 1.6 - 8.3 10e3/uL     Absolute Lymphocytes 1.1 0.8 - 5.3 10e3/uL    Absolute Monocytes 0.3 0.0 - 1.3 10e3/uL    Absolute Eosinophils 0.1 0.0 - 0.7 10e3/uL    Absolute Basophils 0.1 0.0 - 0.2 10e3/uL    Absolute Immature Granulocytes 0.0 <=0.4 10e3/uL    Absolute NRBCs 0.0 10e3/uL   D dimer quantitative   Result Value Ref Range    D-Dimer Quantitative 0.65 (H) 0.00 - 0.50 ug/mL FEU    Narrative    This D-dimer assay is intended for use in conjunction with a clinical pretest probability assessment model to exclude pulmonary embolism (PE) and deep venous thrombosis (DVT) in outpatients suspected of PE or DVT. The cut-off value is 0.50 ug/mL FEU.   Comprehensive metabolic panel   Result Value Ref Range    Sodium 137 135 - 145 mmol/L    Potassium 4.9 3.4 - 5.3 mmol/L    Carbon Dioxide (CO2) 24 22 - 29 mmol/L    Anion Gap 13 7 - 15 mmol/L    Urea Nitrogen 17.4 6.0 - 20.0 mg/dL    Creatinine 0.66 0.51 - 0.95 mg/dL    GFR Estimate >90 >60 mL/min/1.73m2    Calcium 10.0 8.6 - 10.0 mg/dL    Chloride 100 98 - 107 mmol/L    Glucose 107 (H) 70 - 99 mg/dL    Alkaline Phosphatase 66 40 - 150 U/L    AST 49 (H) 0 - 45 U/L    ALT 18 0 - 50 U/L    Protein Total 8.5 (H) 6.4 - 8.3 g/dL    Albumin 3.9 3.5 - 5.2 g/dL    Bilirubin Total 2.9 (H) <=1.2 mg/dL   Procalcitonin   Result Value Ref Range    Procalcitonin 0.10 <0.50 ng/mL   CRP inflammation   Result Value Ref Range    CRP Inflammation 6.71 (H) <5.00 mg/L   Troponin T, High Sensitivity   Result Value Ref Range    Troponin T, High Sensitivity 34 (H) <=14 ng/L   Nt probnp inpatient (BNP)   Result Value Ref Range    N terminal Pro BNP Inpatient 1,602 (H) 0 - 450 pg/mL   XR Chest Port 1 View    Narrative    EXAM: XR CHEST PORT 1 VIEW  LOCATION: [facility]  DATE: 4/18/2024    INDICATION: Hypoxic respiratory failure  COMPARISON: 03/16/2024      Impression    IMPRESSION: Enlarged cardiac silhouette, stable. Central vascular congestion. No edema, consolidation, pleural effusion, or  pneumothorax.   CT Chest Pulmonary Embolism w Contrast    Narrative    EXAM: CT CHEST PULMONARY EMBOLISM W CONTRAST  LOCATION: [facility]  DATE: 4/18/2024    INDICATION: Hypoxic respiratory failure, elevated D dimer  COMPARISON: Chest radiograph today. CTA chest 03/16/2024.  TECHNIQUE: CT chest pulmonary angiogram during arterial phase injection of IV contrast. Multiplanar reformats and MIP reconstructions were performed. Dose reduction techniques were used.   CONTRAST: 96 mL Isovue 370    FINDINGS:  ANGIOGRAM CHEST: No pulmonary embolus. Chronic large size of the pulmonary arterial tree consistent with pulmonary arterial hypertension. No aortic aneurysm or dissection.    LUNGS AND PLEURA: Normal.    MEDIASTINUM/AXILLAE: Calcification of the mitral annulus.    CORONARY ARTERY CALCIFICATION: None.    UPPER ABDOMEN: Postcholecystectomy. Nodular contour of the liver concerning for cirrhosis.    MUSCULOSKELETAL: Degenerative changes of the spine.      Impression    IMPRESSION:  1.  No pulmonary embolus or other acute process in the chest.  2.  Large size of the pulmonary arterial tree consistent with pulmonary arterial hypertension.  3.  Hepatic cirrhosis.   POC US CHEST B-SCAN    Narrative    [facility] Procedure Note      Limited Bedside ED Ultrasound of Thorax:    PROCEDURE: PERFORMED BY: Dr. Teja Monet MD  INDICATIONS/SYMPTOM:  shortness of breath and dyspnea  PROBE: High frequency linear probe  BODY LOCATION: Chest  FINDINGS:  Images of both lung hemithoracies taken in 2D in multiple rib spaces        Right side:  Lung sliding artifact  Present     Comet tail artifacts  Absent   Left side:  Lung sliding artifact  Present     Comet tail artifacts  Absent       INTERPRETATION: The exam was normal. There was no free fluid identified in the chest cavity. No evidence of pneumothorax.  IMAGE DOCUMENTATION: Unable to save ultrasound images as the ultrasound would not connect to the  network.             For the majority of the shift this patient's behavior was Green     Cardiac Rhythm: Tachycardia  Pt needs tele? Yes  Skin/wound Issues:  Abdomen, knee, LLE, RLE redness.     Code Status: Full Code    Pain control: good    Nausea control: pt had none    Abnormal labs/tests/findings requiring intervention: See results. Pt on Bipap. IV ABX given.     Patient tested for COVID 19 prior to admission: YES     OBS brochure/video discussed/provided to patient/family: N/A     Family present during ED course? No     Family Comments/Social Situation comments: NA.     Tasks needing completion: None    Aisha Romero RN

## 2024-04-19 LAB
ALBUMIN SERPL BCG-MCNC: 2.9 G/DL (ref 3.5–5.2)
ALBUMIN UR-MCNC: 30 MG/DL
ALP SERPL-CCNC: 51 U/L (ref 40–150)
ALT SERPL W P-5'-P-CCNC: 11 U/L (ref 0–50)
ANION GAP SERPL CALCULATED.3IONS-SCNC: 11 MMOL/L (ref 7–15)
APPEARANCE UR: ABNORMAL
AST SERPL W P-5'-P-CCNC: 19 U/L (ref 0–45)
BASE EXCESS BLDV CALC-SCNC: 0.5 MMOL/L (ref -3–3)
BILIRUB SERPL-MCNC: 4.8 MG/DL
BILIRUB UR QL STRIP: NEGATIVE
BUN SERPL-MCNC: 15.7 MG/DL (ref 6–20)
CALCIUM SERPL-MCNC: 9 MG/DL (ref 8.6–10)
CHLORIDE SERPL-SCNC: 103 MMOL/L (ref 98–107)
COLOR UR AUTO: ABNORMAL
CREAT SERPL-MCNC: 0.88 MG/DL (ref 0.51–0.95)
DEPRECATED HCO3 PLAS-SCNC: 23 MMOL/L (ref 22–29)
EGFRCR SERPLBLD CKD-EPI 2021: 80 ML/MIN/1.73M2
ENTEROCOCCUS FAECALIS: NOT DETECTED
ENTEROCOCCUS FAECIUM: NOT DETECTED
ERYTHROCYTE [DISTWIDTH] IN BLOOD BY AUTOMATED COUNT: 17.6 % (ref 10–15)
GLUCOSE SERPL-MCNC: 98 MG/DL (ref 70–99)
GLUCOSE UR STRIP-MCNC: NEGATIVE MG/DL
HBV CORE AB SERPL QL IA: NONREACTIVE
HBV SURFACE AB SERPL IA-ACNC: 23.4 M[IU]/ML
HBV SURFACE AB SERPL IA-ACNC: REACTIVE M[IU]/ML
HBV SURFACE AG SERPL QL IA: NONREACTIVE
HCO3 BLDV-SCNC: 26 MMOL/L (ref 21–28)
HCT VFR BLD AUTO: 46.4 % (ref 35–47)
HCV AB SERPL QL IA: NONREACTIVE
HGB BLD-MCNC: 14.4 G/DL (ref 11.7–15.7)
HGB UR QL STRIP: ABNORMAL
KETONES UR STRIP-MCNC: NEGATIVE MG/DL
LACTATE SERPL-SCNC: 1.4 MMOL/L (ref 0.7–2)
LEUKOCYTE ESTERASE UR QL STRIP: NEGATIVE
LISTERIA SPECIES (DETECTED/NOT DETECTED): NOT DETECTED
MCH RBC QN AUTO: 27.8 PG (ref 26.5–33)
MCHC RBC AUTO-ENTMCNC: 31 G/DL (ref 31.5–36.5)
MCV RBC AUTO: 90 FL (ref 78–100)
MUCOUS THREADS #/AREA URNS LPF: PRESENT /LPF
NITRATE UR QL: NEGATIVE
O2/TOTAL GAS SETTING VFR VENT: 30 %
OXYHGB MFR BLDV: 85 % (ref 70–75)
PCO2 BLDV: 42 MM HG (ref 40–50)
PH BLDV: 7.39 [PH] (ref 7.32–7.43)
PH UR STRIP: 5 [PH] (ref 5–7)
PLATELET # BLD AUTO: 89 10E3/UL (ref 150–450)
PO2 BLDV: 51 MM HG (ref 25–47)
POTASSIUM SERPL-SCNC: 3.6 MMOL/L (ref 3.4–5.3)
PROT SERPL-MCNC: 6.7 G/DL (ref 6.4–8.3)
RBC # BLD AUTO: 5.18 10E6/UL (ref 3.8–5.2)
RBC URINE: 11 /HPF
SAO2 % BLDV: 86.7 % (ref 70–75)
SODIUM SERPL-SCNC: 137 MMOL/L (ref 135–145)
SP GR UR STRIP: 1.03 (ref 1–1.03)
SQUAMOUS EPITHELIAL: 1 /HPF
STAPHYLOCOCCUS AUREUS: NOT DETECTED
STAPHYLOCOCCUS EPIDERMIDIS: NOT DETECTED
STAPHYLOCOCCUS LUGDUNENSIS: NOT DETECTED
STAPHYLOCOCCUS SPECIES: NOT DETECTED
STREPTOCOCCUS AGALACTIAE: NOT DETECTED
STREPTOCOCCUS ANGINOSUS GROUP: NOT DETECTED
STREPTOCOCCUS PNEUMONIAE: NOT DETECTED
STREPTOCOCCUS PYOGENES: NOT DETECTED
STREPTOCOCCUS SPECIES: DETECTED
UROBILINOGEN UR STRIP-MCNC: NORMAL MG/DL
WBC # BLD AUTO: 7.2 10E3/UL (ref 4–11)
WBC URINE: 19 /HPF

## 2024-04-19 PROCEDURE — 258N000003 HC RX IP 258 OP 636: Performed by: STUDENT IN AN ORGANIZED HEALTH CARE EDUCATION/TRAINING PROGRAM

## 2024-04-19 PROCEDURE — 200N000001 HC R&B ICU

## 2024-04-19 PROCEDURE — 250N000013 HC RX MED GY IP 250 OP 250 PS 637

## 2024-04-19 PROCEDURE — 94660 CPAP INITIATION&MGMT: CPT

## 2024-04-19 PROCEDURE — 250N000011 HC RX IP 250 OP 636

## 2024-04-19 PROCEDURE — 250N000011 HC RX IP 250 OP 636: Mod: JZ | Performed by: STUDENT IN AN ORGANIZED HEALTH CARE EDUCATION/TRAINING PROGRAM

## 2024-04-19 PROCEDURE — 85027 COMPLETE CBC AUTOMATED: CPT

## 2024-04-19 PROCEDURE — 999N000157 HC STATISTIC RCP TIME EA 10 MIN

## 2024-04-19 PROCEDURE — 99233 SBSQ HOSP IP/OBS HIGH 50: CPT | Performed by: INTERNAL MEDICINE

## 2024-04-19 PROCEDURE — 80053 COMPREHEN METABOLIC PANEL: CPT

## 2024-04-19 PROCEDURE — 87040 BLOOD CULTURE FOR BACTERIA: CPT | Performed by: INTERNAL MEDICINE

## 2024-04-19 PROCEDURE — 36415 COLL VENOUS BLD VENIPUNCTURE: CPT | Performed by: INTERNAL MEDICINE

## 2024-04-19 PROCEDURE — 82805 BLOOD GASES W/O2 SATURATION: CPT

## 2024-04-19 PROCEDURE — 83605 ASSAY OF LACTIC ACID: CPT | Performed by: STUDENT IN AN ORGANIZED HEALTH CARE EDUCATION/TRAINING PROGRAM

## 2024-04-19 PROCEDURE — 250N000013 HC RX MED GY IP 250 OP 250 PS 637: Performed by: INTERNAL MEDICINE

## 2024-04-19 PROCEDURE — 250N000013 HC RX MED GY IP 250 OP 250 PS 637: Performed by: STUDENT IN AN ORGANIZED HEALTH CARE EDUCATION/TRAINING PROGRAM

## 2024-04-19 PROCEDURE — 81001 URINALYSIS AUTO W/SCOPE: CPT

## 2024-04-19 PROCEDURE — 87106 FUNGI IDENTIFICATION YEAST: CPT

## 2024-04-19 PROCEDURE — 250N000011 HC RX IP 250 OP 636: Performed by: STUDENT IN AN ORGANIZED HEALTH CARE EDUCATION/TRAINING PROGRAM

## 2024-04-19 PROCEDURE — 36415 COLL VENOUS BLD VENIPUNCTURE: CPT

## 2024-04-19 PROCEDURE — 250N000011 HC RX IP 250 OP 636: Performed by: INTERNAL MEDICINE

## 2024-04-19 RX ORDER — CEFTRIAXONE 2 G/1
2 INJECTION, POWDER, FOR SOLUTION INTRAMUSCULAR; INTRAVENOUS EVERY 24 HOURS
Status: DISCONTINUED | OUTPATIENT
Start: 2024-04-19 | End: 2024-04-23 | Stop reason: HOSPADM

## 2024-04-19 RX ADMIN — VANCOMYCIN HYDROCHLORIDE 1500 MG: 5 INJECTION, POWDER, LYOPHILIZED, FOR SOLUTION INTRAVENOUS at 04:59

## 2024-04-19 RX ADMIN — CEFTRIAXONE SODIUM 2 G: 2 INJECTION, POWDER, FOR SOLUTION INTRAMUSCULAR; INTRAVENOUS at 17:00

## 2024-04-19 RX ADMIN — SERTRALINE HYDROCHLORIDE 100 MG: 100 TABLET ORAL at 08:41

## 2024-04-19 RX ADMIN — VANCOMYCIN HYDROCHLORIDE 1500 MG: 5 INJECTION, POWDER, LYOPHILIZED, FOR SOLUTION INTRAVENOUS at 17:54

## 2024-04-19 RX ADMIN — ENOXAPARIN SODIUM 40 MG: 100 INJECTION SUBCUTANEOUS at 09:57

## 2024-04-19 RX ADMIN — MEROPENEM 1 G: 1 INJECTION, POWDER, FOR SOLUTION INTRAVENOUS at 08:36

## 2024-04-19 RX ADMIN — METOPROLOL SUCCINATE 25 MG: 25 TABLET, EXTENDED RELEASE ORAL at 08:40

## 2024-04-19 RX ADMIN — CALCIUM CARBONATE (ANTACID) CHEW TAB 500 MG 1000 MG: 500 CHEW TAB at 08:41

## 2024-04-19 RX ADMIN — BUMETANIDE 4 MG: 1 TABLET ORAL at 20:24

## 2024-04-19 RX ADMIN — ENOXAPARIN SODIUM 40 MG: 100 INJECTION SUBCUTANEOUS at 22:07

## 2024-04-19 RX ADMIN — ACETAMINOPHEN 325 MG: 325 TABLET, FILM COATED ORAL at 08:41

## 2024-04-19 RX ADMIN — ACETAMINOPHEN 325 MG: 325 TABLET, FILM COATED ORAL at 17:54

## 2024-04-19 RX ADMIN — HYDROMORPHONE HYDROCHLORIDE 0.3 MG: 1 INJECTION, SOLUTION INTRAMUSCULAR; INTRAVENOUS; SUBCUTANEOUS at 17:54

## 2024-04-19 ASSESSMENT — ACTIVITIES OF DAILY LIVING (ADL)
ADLS_ACUITY_SCORE: 47
ADLS_ACUITY_SCORE: 42
ADLS_ACUITY_SCORE: 47
ADLS_ACUITY_SCORE: 40
ADLS_ACUITY_SCORE: 40
ADLS_ACUITY_SCORE: 47
ADLS_ACUITY_SCORE: 47
DEPENDENT_IADLS:: TRANSPORTATION
ADLS_ACUITY_SCORE: 47
ADLS_ACUITY_SCORE: 40
ADLS_ACUITY_SCORE: 47
ADLS_ACUITY_SCORE: 40
ADLS_ACUITY_SCORE: 40
ADLS_ACUITY_SCORE: 47

## 2024-04-19 NOTE — PLAN OF CARE
Problem: Adult Inpatient Plan of Care  Goal: Plan of Care Review  Description: The Plan of Care Review/Shift note should be completed every shift.  The Outcome Evaluation is a brief statement about your assessment that the patient is improving, declining, or no change.  This information will be displayed automatically on your shift  note.  Outcome: Progressing  Flowsheets (Taken 4/19/2024 0626)  Plan of Care Reviewed With: patient  Overall Patient Progress: improving     Problem: Gas Exchange Impaired  Goal: Optimal Gas Exchange  Outcome: Progressing  Intervention: Optimize Oxygenation and Ventilation  Recent Flowsheet Documentation  Taken 4/19/2024 0600 by Joceline Sierra RN  Head of Bed (HOB) Positioning: HOB at 30 degrees  Taken 4/19/2024 0400 by Joceline Sierra RN  Airway/Ventilation Management:   airway patency maintained   calming measures promoted   pulmonary hygiene promoted  Head of Bed (HOB) Positioning: HOB at 30 degrees  Taken 4/19/2024 0200 by Joceline Sierra RN  Head of Bed (HOB) Positioning: HOB at 30 degrees  Taken 4/19/2024 0000 by Joceline Sierra RN  Airway/Ventilation Management:   airway patency maintained   calming measures promoted   pulmonary hygiene promoted  Taken 4/18/2024 2344 by Joceline Sierra RN  Head of Bed (HOB) Positioning: HOB at 30 degrees  Taken 4/18/2024 2200 by Joceline Sierra RN  Head of Bed (HOB) Positioning: HOB at 30 degrees  Taken 4/18/2024 2000 by Joceline Sierra RN  Airway/Ventilation Management:   airway patency maintained   calming measures promoted   pulmonary hygiene promoted  Head of Bed (HOB) Positioning: HOB at 30 degrees     Problem: Infection  Goal: Absence of Infection Signs and Symptoms  Outcome: Progressing  Intervention: Prevent or Manage Infection  Recent Flowsheet Documentation  Taken 4/19/2024 0400 by Joceline Sierra RN  Infection Management: aseptic technique maintained  Taken 4/19/2024 0000 by Joceline Sierra RN  Infection Management: aseptic technique maintained  Taken 4/18/2024 2000 by  Rigo, Joceline, RN  Infection Management: aseptic technique maintained     Problem: Adult Inpatient Plan of Care  Goal: Absence of Hospital-Acquired Illness or Injury  Intervention: Identify and Manage Fall Risk  Recent Flowsheet Documentation  Taken 4/19/2024 0400 by Joceline Sierra RN  Safety Promotion/Fall Prevention:   activity supervised   clutter free environment maintained   increase visualization of patient   lighting adjusted   room door open   room near nurse's station   room organization consistent   safety round/check completed  Taken 4/19/2024 0000 by Joceline Sierra RN  Safety Promotion/Fall Prevention:   activity supervised   clutter free environment maintained   increase visualization of patient   lighting adjusted   room door open   room near nurse's station   room organization consistent   safety round/check completed  Taken 4/18/2024 2000 by Joceline Sierra RN  Safety Promotion/Fall Prevention:   activity supervised   clutter free environment maintained   increase visualization of patient   lighting adjusted   room door open   room near nurse's station   room organization consistent   safety round/check completed  Intervention: Prevent Skin Injury  Recent Flowsheet Documentation  Taken 4/19/2024 0600 by Joceline Sierra RN  Body Position:   right   turned   legs elevated   heels elevated   side-lying 30 degrees  Taken 4/19/2024 0400 by Joceline Sierra RN  Body Position: refuses positioning  Skin Protection:   adhesive use limited   pulse oximeter probe site changed   incontinence pads utilized   skin to device areas padded  Device Skin Pressure Protection:   absorbent pad utilized/changed   adhesive use limited   skin-to-device areas padded   positioning supports utilized   tubing/devices free from skin contact  Taken 4/19/2024 0200 by Joceline Sierra RN  Body Position:   left   turned   upper extremity elevated   weight shifting  Taken 4/19/2024 0000 by Joceline Sierra RN  Skin Protection:   adhesive use limited   pulse oximeter  probe site changed   incontinence pads utilized   skin to device areas padded  Device Skin Pressure Protection:   absorbent pad utilized/changed   adhesive use limited   skin-to-device areas padded   positioning supports utilized   tubing/devices free from skin contact  Taken 4/18/2024 2344 by Joceline Sierra RN  Body Position:   turned   right   heels elevated   legs elevated  Taken 4/18/2024 2200 by Joceline Sierra RN  Body Position:   left   turned   heels elevated   legs elevated   side-lying 30 degrees   upper extremity elevated   weight shifting  Taken 4/18/2024 2000 by Joceline Sierra RN  Body Position:   right   turned   heels elevated   legs elevated   side-lying 30 degrees   weight shifting   upper extremity elevated  Skin Protection:   adhesive use limited   pulse oximeter probe site changed   incontinence pads utilized   skin to device areas padded  Device Skin Pressure Protection:   absorbent pad utilized/changed   adhesive use limited   skin-to-device areas padded   positioning supports utilized   tubing/devices free from skin contact  Intervention: Prevent and Manage VTE (Venous Thromboembolism) Risk  Recent Flowsheet Documentation  Taken 4/19/2024 0400 by Joceline Sierra RN  VTE Prevention/Management: SCDs (sequential compression devices) on  Taken 4/19/2024 0000 by Joceline Sierra RN  VTE Prevention/Management: SCDs (sequential compression devices) on  Taken 4/18/2024 2000 by Joceline Sierra RN  VTE Prevention/Management: SCDs (sequential compression devices) on  Intervention: Prevent Infection  Recent Flowsheet Documentation  Taken 4/19/2024 0400 by Joceline Sierra RN  Infection Prevention:   cohorting utilized   hand hygiene promoted   rest/sleep promoted   single patient room provided   environmental surveillance performed  Taken 4/19/2024 0000 by Joceline Sierra RN  Infection Prevention:   cohorting utilized   hand hygiene promoted   rest/sleep promoted   single patient room provided   environmental surveillance  performed  Taken 4/18/2024 2000 by Joceline Sierra RN  Infection Prevention:   cohorting utilized   hand hygiene promoted   rest/sleep promoted   single patient room provided   environmental surveillance performed  Goal: Optimal Comfort and Wellbeing  Intervention: Monitor Pain and Promote Comfort  Recent Flowsheet Documentation  Taken 4/19/2024 0400 by Joceline Sierra RN  Pain Management Interventions:   pillow support provided   repositioned  Taken 4/18/2024 2344 by Joceline Sierra RN  Pain Management Interventions:   care clustered   pillow support provided   repositioned  Taken 4/18/2024 2328 by Joceline Sierra RN  Pain Management Interventions: medication (see MAR)  Taken 4/18/2024 2045 by Joceline Sierra RN  Pain Management Interventions: pillow support provided  Taken 4/18/2024 2000 by Joceline Sierra RN  Pain Management Interventions: medication (see MAR)  Intervention: Provide Person-Centered Care  Recent Flowsheet Documentation  Taken 4/19/2024 0400 by Joceline Sierra RN  Trust Relationship/Rapport:   care explained   questions answered   questions encouraged   reassurance provided   thoughts/feelings acknowledged   emotional support provided   choices provided  Taken 4/19/2024 0000 by Joceline Sierra RN  Trust Relationship/Rapport:   care explained   questions answered   questions encouraged   reassurance provided   thoughts/feelings acknowledged   emotional support provided   choices provided  Taken 4/18/2024 2000 by Joceline Sierra RN  Trust Relationship/Rapport:   care explained   questions answered   questions encouraged   reassurance provided   thoughts/feelings acknowledged   emotional support provided   choices provided   Goal Outcome Evaluation:      Plan of Care Reviewed With: patient    Overall Patient Progress: improvingOverall Patient Progress: improving        Patient is A/Ox4, was oriented to room/call light use. Patient verbalized understanding of disease process and treatment plan. Patient reports having pain with increased  activity LLE, Tylenol/Dilaudid given with the relief.Minimum UOP via external catheter.BiPAP 14/7 30%FiO2 through the night tolerated well.ABX  Zosyn changed to Meropenem due to  Blood culture results.

## 2024-04-19 NOTE — PROGRESS NOTES
[facility] Medicine Progress Note  Date of Service (when I saw the patient): 04/19/2024    REASON FOR ADMISSION / INTERVAL HISTORY:  Bess Enamorado is a 48 year old female with past medical history of severe pulmonary hypertension with HFpEF, chronic oxygen & CPAP dependence, obesity hypoventilation syndrome, recent admission for cellulitis with recent completion of IV antibiotics, anxiety, hypertension, history of pulmonary embolism, eating disorder, previous NSTEMI now presents on 4/18/2024 with leg pain. She was also found by EMS to be hypoxic with perioral cyanosis.   She has pain still-not much change. See details below.       Assessment/ Plan     Sepsis   Left lower extremity cellulitis     Presents with left leg pain beginning overnight 4/17-4/18. Septic based on fever (100.4F, tachycardia (104bpm), and tachypnea (20/min). Lactic acid elevated (2.6).    Recently admitted 3/16-3/20/24 for left lower extremity cellulitis due to staph lugdunensis treated with IV meropenem, IV vancomycin, IV cefazolin, and then changed back to IV ancef completed outpatient 3/30/24 per ID recommendations. Felt things were nearly back to normal until 1 day PTA. Suspect source of sepsis is left leg cellulitis, concern for re-infection due to open wounds behind knee.   Started on Vancomycin/ Piperacillin-tazobactam 4.5g Q6hrs on admit-changed to meropenam 4/18   Blood cultures 4/18 x2 NGTD  AMS recommended to stop meropenam-start rocephin. Can stop vanco in a day or so if cx negative for MRSA.  -start ceftriaxone/ stop meropenam. Continue vanco for now. Follow cx. If any wound drainage from the wound behind knee, could send cx.     Chronic heart failure with preserved ejection fraction   Pulmonary hypertension, severe    Follows with Dr. Diaz, [facility]. Last seen 4/4/24 for severe R heart failure & critical pulm HTN with severe right-sided congestion & renal congestion causing  inability to adequately diurese. For this reason, PTA bumetanide was increased to 4mg BID (from 3mg mg daily). Subsequent plan was for addition of metolazone or subcutaneous furosemide if not diuresing with increased bumetanide.     On presentation appears euvolemic. Has central pulm congestion on xray, but CT overall clear. BNP elevated but actually improved compared to previous admission (1602).  Got 500cc fluid bolus on admission.   -resume  PTA bumetanide      Chronic respiratory failure with hypoxia   Noted by EMS to by cyanotic around lips & fingers. Initiated on BiPAP in ER. Pulse oximeter shows SPO2 consistently >90%, with VBG showing hypoxia (pO2 19), otherwise WNL. Endorses consistent, near-constant CPAP prior to EMS arrival.   CXR shows enlarged cardiac silhouette with central vascular congestion. CT PE study shows no PE, large pulmonary arterial tree consistent with pulmonary HTN. Low suspicion for acute worsening of chronic respiratory failure.   -Continue BiPAP PRN with weaning to home CPAP as able     Concern for hepatic cirrhosis  Noted incidentally on CT PE is nodular contour of liver concerning for cirrhosis. Noted during previous CT scan March 2024 but no formal diagnosis prior to that. Could be due to chronic congestion from severe pulmonary HTN. LFTs mildly elevated in mixed pattern (AST 49, total bilirubin 2.9. Remainder WNL: ALT 18, alk phos 66).   -Hepatitis B surface antibody, core antibody, & surface antigen  -Hepatitis C antibody        Elevated d-dimer  D-dimer on presentation mildly elevated. History of PE. CT PE study 4/18/24 negative for acute PE. Lower extremity duplex US pending to rule out DVT in lower extremities.   LE doppler US nEgative for DVT     Obesity hypoventilation syndrome on CPAP  Morbid obesity  BMP >50. Dependent on CPAP for most of the day while in recliner. Recently prescribed Zepbound injection weekly for weight loss at PCP visit 4/17/24 but has not started yet.  Initiated on BiPAP on presentation for acute respiratory distress. Discussed with RT, plan to wean to home CPAP settings ([id number]) as able.   -Continue home CPAP with stable home settings     Erythrocytosis  Mild, new. Hemoglobin baseline around 15.0, hemoglobin on presentation 16.0. Suspect hemoconcentration from decreased PO intake & increased diuresis.   Stable.     Hypertension  Normotensive on admission. Managed PTA with metoprolol succinate 25mg daily, continue with hold parameters.      Major depressive disorder   Generalized anxiety disorder  Stable. Managed PTA with sertraline 100mg daily, continue.      History of pulmonary embolism  History of right upper lobe PE discovered Feb 2022 in setting of acute covid infection, treated with xarelto which was stopped after 3 months treatment by pulmonology. Not currently on anticoagulation.      Multiple sclerosis   Very immobile. Does not follow with neurology. Not on any outpatient pharmacologic management. Symptoms stable.     DISPO  Will be in hospital 2-3 days atleast          Diet: Regular Diet Adult    DVT Prophylaxis: Enoxaparin (Lovenox) SQ  Brar Catheter: Not present  Code Status: Full Code        KEVIN OCONNOR MD   Pg [phone]        ROS:  As described in A/P and Exam.  Otherwise ALL are  negative.    PHYSICAL EXAM:  All vitals have been reviewed    Blood pressure 92/61, pulse 84, temperature 99.5  F (37.5  C), temperature source Axillary, resp. rate 22, weight (!) 151 kg (332 lb 14.3 oz), SpO2 91%, not currently breastfeeding.    I/O this shift:  In: 418 [P.O.:418]  Out: 175 [Urine:175]    GENERAL APPEARANCE: morbidly obese, alert and no distress  EYES: conjunctiva clear, eyes grossly normal  HENT: external ears and nose normal   RESP: lungs clear to auscultation - no rales, rhonchi or wheezes  CV: regular rate and rhythm, normal S1 S2, no S3 or S4 and no murmur, click or rub   ABDOMEN: soft, nontender, no HSM or masses and bowel sounds  normal  MS: no clubbing, cyanosis; massive lymphedema B-with oozing fluid / few open blisters L leg  SKIN: clear without significant rashes or lesions  NEURO: -non-focal moves all 4 extr    ROUTINE  LABS (Last four results)  CMP  Recent Labs   Lab 04/19/24  0649 04/18/24  2343 04/18/24  1954 04/18/24  1147 04/18/24  0510 04/17/24  0924     --   --   --  137 137   POTASSIUM 3.6  --   --   --  4.9 4.0   CHLORIDE 103  --   --   --  100 100   CO2 23  --   --   --  24 28   ANIONGAP 11  --   --   --  13 9   GLC 98 89 88 97 107* 87   BUN 15.7  --   --   --  17.4 18.4   CR 0.88  --   --   --  0.66 0.72   GFRESTIMATED 80  --   --   --  >90 >90   EVITA 9.0  --   --   --  10.0 9.9   PROTTOTAL 6.7  --   --   --  8.5*  --    ALBUMIN 2.9*  --   --   --  3.9  --    BILITOTAL 4.8*  --   --   --  2.9*  --    ALKPHOS 51  --   --   --  66  --    AST 19  --   --   --  49*  --    ALT 11  --   --   --  18  --      CBC  Recent Labs   Lab 04/19/24  0532 04/18/24  0456   WBC 7.2 7.8   RBC 5.18 5.78*   HGB 14.4 16.0*   HCT 46.4 49.8*   MCV 90 86   MCH 27.8 27.7   MCHC 31.0* 32.1   RDW 17.6* 18.5*   PLT 89* 164     INRNo lab results found in last 7 days.  Arterial Blood Gas  Recent Labs   Lab 04/19/24  0533 04/18/24  0456   O2PER 30 80       No results found for this or any previous visit (from the past 24 hour(s)).

## 2024-04-19 NOTE — PROGRESS NOTES
Antimicrobial Stewardship Team Note    Antimicrobial Stewardship Program - A joint venture between [facility] and  Physicians to optimize antibiotic management.  NOT a formal consult - Restricted Antimicrobial Review     Patient: Bess Enamorado  MRN: [id number]  Allergies: Nkda [no known drug allergy]    Brief Summary: Bess Enamorado is a 48 year old female with a history of severe pulmonary hypertension with HFpEF, chronic oxygen and CPAP dependence, obesity hypoventilation syndrome, multiple sclerosis with limited mobility, anxiety, HTN, PE, eating disorder, previous NSTEMI, and recent admission to [facility] for Staphylococcus lugdunensis bacteremia secondary to left leg cellulitis treated with IV vancomycin through 3/30 who presented on 4/18 with left leg pain and cyanosis.      History of Present Illness: On presentation, the patient was febrile with a Tmax of 100.4 and had a WBC of 7.8, a procalcitonin of 0.1, a lactic acid of 2.6, and a CRP of 6.71.  The patient does have an open wound behind the left knee.  Vancomycin and piperacillin/tazobactam were empirically started.  A CT chest was negative for PE or any other acute process.  Blood cultures were collected which are positive for GPC in 2/4 bottles in 1/2 sets that has been identified as a Streptococcus species on Verigene and GNB in 2/4 bottles in 1/2 sets that has been identified as Klebsiella oxytoca with no resistance markers detected on Verigene.  The piperacillin/tazobactam was broadened to meropenem due to concern for potential ESBL based on the antibiogram for [facility] reporting 5.7% ESBLs in K oxytoca isolates.  The patient's chart has a PICC line documented as being in place from the patient's previous OPAT course, however, after consultation with [facility], it appears the patient's PICC line was pulled on 4/1/24 and the current [facility] documentation is inaccurate.  Today, the patient is afebrile, has a WBC of 7.2 and a  lactic acid of 1.4.  Repeat blood cultures and a urine culture were collected that are both pending.  The patient remains on meropenem and vancomycin.           Active Anti-infective Medications   (From admission, onward)                 Start     Stop    04/18/24 2300  meropenem  1 g,   Intravenous,   EVERY 8 HOURS        klebsiella oxcytoca bacteremia with antibiogram showing 5,7% ESBL.       --    04/18/24 0515  vancomycin (VANCOCIN) injection  1,500 mg,   Intravenous,   EVERY 12 HOURS        Sepsis, Skin and Soft Tissue Infection       --                  Assessment: K. oxytoca and Strep bacteremia 2/2 to cellulitis.    While not 100% certain, the most likely source of the bacteremia for this patient appears to be via the open wound on the back of the knee.  Strep species are known skin colonizers and it would be fairly easy to pass through an open wound in the skin an alternative source would be via the oral cavity where the patient has documented dryness around the mouth based on pictures in the chart.  The path into the bloodstream for the K oxytoca is a little bit harder to determine but the most likely sources would be either urinary tract or via contamination of the open wound with urine or fecal matter.  If the PICC line truly has not been removed, this is also a potential source and should be removed as soon as possible.  Regardless, the patient will need at least 14 days of treatment from first negative blood culture for the gram positive bacteremia.      Based on Verigene results, the patient's K oxytoca is not an ESBL as the CTX-M gene was not detected.  This is the most common ESBL gene in this region of the [place] and the absence of detection on Verigene makes it likely this organism is highly susceptible to beta-lactams.  Additionally, K oxytoca is not commonly associated with Amp-C production and as a result, ceftriaxone is likely sufficient to treat this organism.  Ceftriaxone is also  sufficient to treat Strep species as they remain highly susceptible to beta-lactams as well.  We recommend discontinuing meropenem and starting ceftriaxone 2g IV q24h.  Of note, there is still some concern that the Stapylococcus lugdunensis could grow again so at this time we recommend keeping the vancomycin at this time.  If no new gram positive organisms grow in the next 24-48 hours, vancomycin can be discontinued.      Total duration will be 14 days from first negative blood culture unless the patient were to isolate S lugdunensis in which case the duration would likely need to be revised.  This would make 4/19 day 1 of therapy if the blood cultures are negative.  The patient may be eligible for oral step down therapy in the future but would wait for final susceptibilities of the K oxytoca prior to making a decision on the best agent, however, given the patient's body habitus, patient may need higher doses of any oral agent chosen.      Recommendations:  Stop meropenem  Continue vancomycin for now but can discontinue in 24-48 hours pending blood culture results.  Start ceftriaxone 2g IV q24h  Plan for total duration of 14 days from 1st negative blood culture pending GPC species identification.    Pharmacy took the following actions: Called/paged provider, Electronic note created.    Discussed with ID Staff: Dr. Km Haile MD; Marilou Roach, PharmD, BCIDP  Faith PerryD  Pager: [phone]    Vital Signs/Clinical Features:  Vitals         04/17 0700  04/18 0659 04/18 0700  04/19 0659 04/19 0700  04/19 1138   Most Recent      Temp ( F)   100.4    98 -  100      99.5     99.5 (37.5) 04/19 0748    Pulse 103 -  107    75 -  112    79 -  89     84 04/19 1047    Resp 0 -  29    8 -  25    8 -  24     22 04/19 1047    /70 -  158/105    82/57 -  131/81      92/61     92/61 04/19 0700    SpO2 (%) 91 -  99    84 -  96    86 -  94     91 04/19 1047            Labs  Estimated Creatinine Clearance: 113.8  mL/min (based on SCr of 0.88 mg/dL).  Recent Labs   Lab Test 03/20/24  0441 03/23/24  0915 03/25/24  0930 04/17/24  0924 04/18/24  0510 04/19/24  0649   CR 0.66 0.69 0.72 0.72 0.66 0.88       Recent Labs   Lab Test 10/22/18  1921 10/23/18  0225 10/23/18  0500 10/24/18  0503 06/09/19  1936 02/24/20  1233 02/25/20  0514 02/26/20  0535 03/04/21  2127 03/06/21  0611 03/16/24  0609 03/17/24  0534 03/19/24  0438 03/25/24  0930 04/18/24  0456 04/19/24  0532   WBC 34.3*  --  18.2*   < > 7.2 20.9* 13.5*   < > 10.9   < > 12.7* 4.7 4.2 4.6 7.8 7.2   ANEU 31.8*  --  16.6*  --  5.8 19.6* 12.4*  --  9.4*  --   --   --   --   --   --   --    ALYM 0.3*  --  0.3*  --  0.4* 0.3* 0.3*  --  0.7*  --   --   --   --   --   --   --    PIERCE 1.7*  --  1.1  --  0.7 0.8 0.7  --  0.7  --   --   --   --   --   --   --    AEOS 0.0  --  0.0  --  0.2 0.0 0.1  --  0.1  --   --   --   --   --   --   --    HGB 14.2  --  12.8   < > 13.1 14.6 12.3   < > 14.3   < > 15.4 13.9 13.6 14.8 16.0* 14.4   HCT 45.0  --  40.8   < > 42.7 45.7 39.1   < > 47.4*   < > 48.7* 45.3 43.8 45.7 49.8* 46.4   MCV 86  --  88   < > 83 84 84   < > 87   < > 85 88 86 85 86 90      < > 193   < > 254 329 213   < > 284   < > 189 156 175 203 164 89*    < > = values in this interval not displayed.       Recent Labs   Lab Test 02/20/22  2122 02/26/22  1128 07/20/22  1505 09/27/23  1631 03/16/24  0609 03/25/24  0930 04/18/24  0510 04/19/24  0649   BILITOTAL 1.8*  --   --  2.4* 2.1* 1.9* 2.9* 4.8*   ALKPHOS 66  --   --  69 71 65 66 51   ALBUMIN 3.7   < > 3.4 3.9 3.8 3.7 3.9 2.9*   AST 31  --   --  24 20 25 49* 19   ALT 19  --   --  11 9 10 18 11    < > = values in this interval not displayed.       Recent Labs   Lab Test 03/06/21  0611 02/08/22  1115 02/08/22 2012 02/09/22  0457 02/10/22  0534 02/11/22  0502 02/12/22  0454 02/20/22  2303 02/21/22  0626 04/06/22  2359 04/07/22  0935 04/07/22  1157 12/21/23  1538 03/16/24  0609 04/18/24  0456 04/18/24  0510 04/18/24  0736  04/18/24  1320 04/19/24  0532   PCAL  --   --  1.00*  --   --   --   --   --  0.15  --  0.75*  --   --   --   --  0.10  --   --   --    LACT  --  1.4  --   --   --   --   --    < >  --    < >  --    < > 1.7 1.9 2.6*  --  2.1* 2.3* 1.4   .0* 7.9*  --  21.8* 20.0* 13.1* 8.5*  --   --   --   --   --   --   --   --   --   --   --   --    CRPI  --   --   --   --   --   --   --   --   --   --   --   --   --   --   --  6.71*  --   --   --     < > = values in this interval not displayed.       Recent Labs   Lab Test 03/25/24  0930   VANCOMYCIN 22.7       Culture Results:  7-Day Micro Results       Procedure Component Value Units Date/Time    Blood Culture Hand, Left [[id number]] Collected: 04/19/24 1010    Order Status: Sent Lab Status: In process Updated: 04/19/24 1014    Specimen: Blood from Hand, Left     Urine Culture [[id number]] Collected: 04/19/24 0950    Order Status: Sent Lab Status: In process Updated: 04/19/24 1009    Specimen: Urine, Catheter     Blood Culture Hand, Left [[id number]] Collected: 04/19/24 0913    Order Status: Sent Lab Status: In process Updated: 04/19/24 0915    Specimen: Blood from Hand, Left     Blood Culture Line, venous [[id number]]  (Abnormal) Collected: 04/18/24 0502    Order Status: Completed Lab Status: Preliminary result Updated: 04/18/24 2002    Specimen: Blood from Line, venous      Culture Positive on the 1st day of incubation      Gram negative bacilli     Comment: 2 of 2 bottles       Blood Culture Line, venous [[id number]]  (Abnormal) Collected: 04/18/24 0502    Order Status: Completed Lab Status: Preliminary result Updated: 04/19/24 0623    Specimen: Blood from Line, venous      Culture Positive on the 1st day of incubation      Gram positive cocci     Comment: 2 of 2 bottles       Verigene GN Panel [[id number]]  (Abnormal) Collected: 04/18/24 0502    Order Status: Completed Lab Status: Final result Updated: 04/18/24 2757    Specimen: Blood from Line, venous       Acinetobacter species Not Detected     Citrobacter species Not Detected     Enterobacter species Not Detected     Proteus species Not Detected     Escherichia coli Not Detected     Klebsiella pneumoniae Not Detected     Klebsiella oxytoca Detected     Comment: Positive for Klebsiella oxytoca by Verigene multiplex nucleic acid test. Final identification and antimicrobial susceptibility testing will be verified by standard methods.        Pseudomonas aeruginosa Not Detected     CTX-M Not Detected     KPC Not Detected     NDM Not Detected     VIM Not Detected     IMP Not Detected     OXA Not Detected    Narrative:      Specimen tested with Verigene multiplex, gram-negative blood culture nucleic acid test for the following targets: Acinetobacter species, Citrobacter species, Enterobacter species, Proteus species, Escherichia coli, Klebsiella pneumoniae, Klebsiella oxytoca, Pseudomonas aeruginosa, and the following resistance markers: CTX-M, KPC, NDM, VIM, IMP and OXA.    [email or web address] GP Panel [[id number]]  (Abnormal) Collected: 04/18/24 0502    Order Status: Completed Lab Status: Final result Updated: 04/19/24 0560    Specimen: Blood from Line, venous      Staphylococcus species Not Detected     Staphylococcus aureus Not Detected     Staphylococcus epidermidis Not Detected     Staphylococcus lugdunensis Not Detected     Enterococcus faecalis Not Detected     Enterococcus faecium Not Detected     Streptococcus species Detected     Comment: Positive for Streptococcus species other than Streptococcus pneumococcus, Streptococcus anginosus group, Streptococcus pyogenes and Streptococcus agalactiae. Performed using [employer] multiplex nucleic acid test. Final identification and antimicrobial susceptibility testing will be verified by standard methods.        Streptococcus agalactiae Not Detected     Streptococcus anginosus group Not Detected     Streptococcus pneumoniae Not Detected     Streptococcus pyogenes Not Detected      Listeria species Not Detected    Narrative:      Specimen tested with Verigene multiplex, gram-positive blood culture nucleic acid test for the following targets: Staphylococcus aureus, Staphylococcus epidermidis, Staphylococcus lugdunensis, other Staphylococcus species, Enterococcus faecalis, Enterococcus faecium, Streptococcus species, Streptococcus agalactiae, Streptococcus anginosus group, Streptococcus pneumoniae, Streptococcus pyogenes, Listeria species, mecA (methicillin resistance), and Gage/vanB (vancomycin resistance).            Recent Labs   Lab Test 04/06/22  1056 05/02/22  1712 12/21/23  1445 03/16/24  0754 04/19/24  0950   URINEPH 5.5 5.5 6.0 6.5 5.0   NITRITE Positive* Negative Positive* Negative Negative   LEUKEST 500 Gigi/uL* 250 Gigi/uL* 500 Gigi/uL* Negative Negative   WBCU >182* 32* >182* 1 19*                   Recent Labs   Lab Test 02/09/22  0140   CDBPCT Negative       Imaging: US Lower Extremity Venous Duplex Bilateral    Result Date: 4/18/2024  US LOWER EXTREMITY VENOUS DUPLEX BILATERAL 4/18/2024 9:49 AM CLINICAL HISTORY: Lower extremity swelling and pain (especially left leg). Recent cellulitis, but also has history of PE. Please evaluate for VTE of lower extremities causing increased pain and swelling. TECHNIQUE: Venous Duplex ultrasound of bilateral lower extremities with and without compression, augmentation and duplex. Color flow and spectral Doppler with waveform analysis performed. COMPARISON: None. FINDINGS: Exam includes the common femoral, femoral, popliteal veins as well as segmentally visualized deep calf veins and greater saphenous vein. RIGHT: No deep vein thrombosis. No superficial thrombophlebitis. No popliteal cyst. LEFT: No deep vein thrombosis, as seen. Left calf veins are obscured by edema. No superficial thrombophlebitis. No popliteal cyst. Distal lower extremity subcutaneous edema.     IMPRESSION: No deep venous thrombosis in the bilateral lower extremities, as seen.  JAIRO MAK MD   SYSTEM ID:  [id number]    CT Chest Pulmonary Embolism w Contrast    Result Date: 4/18/2024  EXAM: CT CHEST PULMONARY EMBOLISM W CONTRAST LOCATION: [facility] DATE: 4/18/2024 INDICATION: Hypoxic respiratory failure, elevated D dimer COMPARISON: Chest radiograph today. CTA chest 03/16/2024. TECHNIQUE: CT chest pulmonary angiogram during arterial phase injection of IV contrast. Multiplanar reformats and MIP reconstructions were performed. Dose reduction techniques were used. CONTRAST: 96 mL Isovue 370 FINDINGS: ANGIOGRAM CHEST: No pulmonary embolus. Chronic large size of the pulmonary arterial tree consistent with pulmonary arterial hypertension. No aortic aneurysm or dissection. LUNGS AND PLEURA: Normal. MEDIASTINUM/AXILLAE: Calcification of the mitral annulus. CORONARY ARTERY CALCIFICATION: None. UPPER ABDOMEN: Postcholecystectomy. Nodular contour of the liver concerning for cirrhosis. MUSCULOSKELETAL: Degenerative changes of the spine.     IMPRESSION: 1.  No pulmonary embolus or other acute process in the chest. 2.  Large size of the pulmonary arterial tree consistent with pulmonary arterial hypertension. 3.  Hepatic cirrhosis.    POC US CHEST B-SCAN    Result Date: 4/18/2024  [facility] Procedure Note   Limited Bedside ED Ultrasound of Thorax: PROCEDURE: PERFORMED BY: Dr. Teja Monet MD INDICATIONS/SYMPTOM:  shortness of breath and dyspnea PROBE: High frequency linear probe BODY LOCATION: Chest FINDINGS: Images of both lung hemithoracies taken in 2D in multiple rib spaces      Right side:  Lung sliding artifact  Present    Comet tail artifacts  Absent  Left side:  Lung sliding artifact  Present    Comet tail artifacts  Absent   INTERPRETATION: The exam was normal. There was no free fluid identified in the chest cavity. No evidence of pneumothorax. IMAGE DOCUMENTATION: Unable to save ultrasound images as the ultrasound would not connect to the network.     XR  Chest Port 1 View    Result Date: 4/18/2024  EXAM: XR CHEST PORT 1 VIEW LOCATION: [facility] DATE: 4/18/2024 INDICATION: Hypoxic respiratory failure COMPARISON: 03/16/2024     IMPRESSION: Enlarged cardiac silhouette, stable. Central vascular congestion. No edema, consolidation, pleural effusion, or pneumothorax.

## 2024-04-19 NOTE — PLAN OF CARE
Goal Outcome Evaluation:      Plan of Care Reviewed With: patient    Overall Patient Progress: improvingOverall Patient Progress: improving         End Of Shift Note       Neuro: PERRL. Aox4. Pleasant but quite upset after hearing about her liver not doing well. Emotional support and presence provided. Reports HA yesterday but none today. Denies dizziness, changes vision or hearing.      Cardiac: T max 99.5 this morning. NSR in the 70-80's. SBPs . Significant ;lymphedema in the legs. Poor perfusion with dusky blue toes- more so on the right leg. Positioning adjusted. Declined heat beyond warm blankets as it irritates her      Resp: Lungs clear on dim. Satting in the low 90's with 5L O2, when it comes off she desats to mid to low 80's. Has a dry cough. Denies any congestion or rhinorrhea. Intermittent tachypnea. SOB and turns blue when flat or with significant activity.      GI/: Abdomen soft. Bowel sound present. Last BM yesterday and states had regular and then some loose the 2nd time. Denies nausea, cramps heart burn. She ate about 25% of her meal this morning. Sipping on apple juice and coffee.     Pure wick in place. UA sent.      MSK: Generalized weakness- states normally she can walk with walker but is painful now with her leg. Plan to use dilaudid PRN.      Skin: redness on nose. Tegaderm in place to protect. Red and irritated tight in places. Dusky in peripheral extremities. Wounds. Dryness.       Full bedbath done. Up to chair      LDAs: 2 PIV's. Pure wick.

## 2024-04-19 NOTE — PROGRESS NOTES
[facility]  Patient is currently open to home care services with [facility]. The patient has  RN PT OT services.  [facility]  and team have been notified of patient admission.  [facility] liaison will continue to follow patient during stay.  If appropriate provide orders to resume home care at time of discharge.

## 2024-04-19 NOTE — CONSULTS
Care Management Initial Consult    General Information  Assessment completed with: Patient   Type of CM/SW Visit: Initial Assessment    Primary Care Provider verified and updated as needed: Yes   Readmission within the last 30 days: no previous admission in last 30 days      Reason for Consult: discharge planning  Advance Care Planning:          Communication Assessment  Patient's communication style: spoken language (English or Bilingual)    Hearing Difficulty or Deaf: no   Wear Glasses or Blind: no    Cognitive  Cognitive/Neuro/Behavioral: WDL                      Living Environment:   People in home: alone     Current living Arrangements: house      Able to return to prior arrangements: yes     Family/Social Support:  Care provided by: self, homecare agency  Provides care for: no one  Marital Status: Single  Sibling(s)          Description of Support System: Supportive, Involved    Support Assessment: Adequate family and caregiver support, Adequate social supports    Current Resources:   Patient receiving home care services: Yes  Skilled Home Care Services: Skilled Nursing, Physical Therapy, Occupational Therapy  Community Resources: Home Care  Equipment currently used at home: walker, standard, wheelchair, manual  Supplies currently used at home: Incontinence Supplies, Oxygen Tubing/Supplies    Employment/Financial:  Employment Status: disabled        Financial Concerns: none      Lifestyle & Psychosocial Needs:  Social Determinants of Health     Food Insecurity: Low Risk  (3/21/2024)    Food Insecurity     Within the past 12 months, did you worry that your food would run out before you got money to buy more?: No     Within the past 12 months, did the food you bought just not last and you didn t have money to get more?: No   Depression: Not at risk (2/7/2024)    PHQ-2     PHQ-2 Score: 2   Housing Stability: Low Risk  (3/21/2024)    Housing Stability     Do you have housing? : Yes     Are you worried about losing  your housing?: No   Tobacco Use: Low Risk  (4/17/2024)    Patient History     Smoking Tobacco Use: Never     Smokeless Tobacco Use: Never     Passive Exposure: Not on file   Financial Resource Strain: Low Risk  (9/25/2023)    Financial Resource Strain     Within the past 12 months, have you or your family members you live with been unable to get utilities (heat, electricity) when it was really needed?: No   Alcohol Use: Not on file   Transportation Needs: Low Risk  (3/21/2024)    Transportation Needs     Within the past 12 months, has lack of transportation kept you from medical appointments, getting your medicines, non-medical meetings or appointments, work, or from getting things that you need?: No   Physical Activity: Inactive (3/21/2024)    Exercise Vital Sign     Days of Exercise per Week: 0 days     Minutes of Exercise per Session: 0 min   Interpersonal Safety: Low Risk  (9/27/2023)    Interpersonal Safety     Do you feel physically and emotionally safe where you currently live?: Yes     Within the past 12 months, have you been hit, slapped, kicked or otherwise physically hurt by someone?: No     Within the past 12 months, have you been humiliated or emotionally abused in other ways by your partner or ex-partner?: No   Stress: No Stress Concern Present (12/4/2020)    [employer] [place] [employer] - Occupational Stress Questionnaire     Feeling of Stress : Only a little   Social Connections: Unknown (3/1/2022)    Social Connection and Isolation Panel [NHANES]     Frequency of Communication with Friends and Family: Twice a week     Frequency of Social Gatherings with Friends and Family: Twice a week     Attends Jew Services: Not on file     Active Member of Clubs or Organizations: Not on file     Attends Club or Organization Meetings: Not on file     Marital Status: Not on file   Health Literacy: Not on file     Functional Status:  Prior to admission patient needed assistance:   Dependent ADLs::  Ambulation-walker, Wheelchair-independent  Dependent IADLs:: Transportation     Mental Health Status:  Mental Health Status: Current Concern  Mental Health Management: Medication    Chemical Dependency Status:  Chemical Dependency Status: No Current Concerns          Values/Beliefs:  Spiritual, Cultural Beliefs, Lutheran Practices, Values that affect care: no               Additional Information:  Care Management met with patient at bedside - CM introduced self and explained role.     [facility] [place] [place] [facility] [facility] Patient is current with [facility] (Phone: [phone]) PT, OT, RN. Confirmed patient is current with [facility] and will likely need lymphedema therapy. Per Ibeth, liaison from [facility], lymphedema therapy from [facility] OT should be okay to be added upon discharge.     At baseline, patient uses 2WW or wheelchair for mobility and is able to transfer self independently. She is on 0.5 L O2 at baseline. Patient was on home IV abx after last hospitalization, but these have since been completed.     PT/OT, lymphedema therapy to see patient in hospital and make recommendations. Patient has been to TCU before and would prefer not to return to TCU upon discharge. Patient thinks she can manage at home with current mobility level.     Plan: Home with [facility] PT, OT (lymph), and RN  Transport: Family      KAY Gonsalez  Inpatient Care Coordinator   [facility] [phone]  [facility] [phone]

## 2024-04-20 ENCOUNTER — APPOINTMENT (OUTPATIENT)
Dept: PHYSICAL THERAPY | Facility: CLINIC | Age: 49
DRG: 872 | End: 2024-04-20
Payer: MEDICARE

## 2024-04-20 ENCOUNTER — APPOINTMENT (OUTPATIENT)
Dept: OCCUPATIONAL THERAPY | Facility: CLINIC | Age: 49
DRG: 872 | End: 2024-04-20
Payer: MEDICARE

## 2024-04-20 LAB
ERYTHROCYTE [DISTWIDTH] IN BLOOD BY AUTOMATED COUNT: 17.5 % (ref 10–15)
HCT VFR BLD AUTO: 44.4 % (ref 35–47)
HGB BLD-MCNC: 14 G/DL (ref 11.7–15.7)
MCH RBC QN AUTO: 27.7 PG (ref 26.5–33)
MCHC RBC AUTO-ENTMCNC: 31.5 G/DL (ref 31.5–36.5)
MCV RBC AUTO: 88 FL (ref 78–100)
PLATELET # BLD AUTO: 158 10E3/UL (ref 150–450)
RBC # BLD AUTO: 5.06 10E6/UL (ref 3.8–5.2)
VANCOMYCIN SERPL-MCNC: 16 UG/ML
WBC # BLD AUTO: 5.7 10E3/UL (ref 4–11)

## 2024-04-20 PROCEDURE — 250N000011 HC RX IP 250 OP 636: Performed by: STUDENT IN AN ORGANIZED HEALTH CARE EDUCATION/TRAINING PROGRAM

## 2024-04-20 PROCEDURE — 99207 PR CDG-CUT & PASTE-POTENTIAL IMPACT ON LEVEL: CPT | Performed by: INTERNAL MEDICINE

## 2024-04-20 PROCEDURE — 97166 OT EVAL MOD COMPLEX 45 MIN: CPT | Mod: GO

## 2024-04-20 PROCEDURE — 94660 CPAP INITIATION&MGMT: CPT

## 2024-04-20 PROCEDURE — 85014 HEMATOCRIT: CPT | Performed by: INTERNAL MEDICINE

## 2024-04-20 PROCEDURE — 250N000013 HC RX MED GY IP 250 OP 250 PS 637

## 2024-04-20 PROCEDURE — 250N000013 HC RX MED GY IP 250 OP 250 PS 637: Performed by: INTERNAL MEDICINE

## 2024-04-20 PROCEDURE — 999N000157 HC STATISTIC RCP TIME EA 10 MIN

## 2024-04-20 PROCEDURE — 99233 SBSQ HOSP IP/OBS HIGH 50: CPT | Performed by: INTERNAL MEDICINE

## 2024-04-20 PROCEDURE — 97530 THERAPEUTIC ACTIVITIES: CPT | Mod: GO

## 2024-04-20 PROCEDURE — 250N000011 HC RX IP 250 OP 636: Performed by: INTERNAL MEDICINE

## 2024-04-20 PROCEDURE — 258N000003 HC RX IP 258 OP 636: Performed by: STUDENT IN AN ORGANIZED HEALTH CARE EDUCATION/TRAINING PROGRAM

## 2024-04-20 PROCEDURE — 200N000001 HC R&B ICU

## 2024-04-20 PROCEDURE — 80202 ASSAY OF VANCOMYCIN: CPT | Performed by: INTERNAL MEDICINE

## 2024-04-20 PROCEDURE — 36415 COLL VENOUS BLD VENIPUNCTURE: CPT | Performed by: INTERNAL MEDICINE

## 2024-04-20 PROCEDURE — 97161 PT EVAL LOW COMPLEX 20 MIN: CPT | Mod: GP | Performed by: PHYSICAL THERAPIST

## 2024-04-20 PROCEDURE — 258N000003 HC RX IP 258 OP 636: Performed by: INTERNAL MEDICINE

## 2024-04-20 RX ORDER — CEFAZOLIN SODIUM 1 G/50ML
1250 SOLUTION INTRAVENOUS EVERY 12 HOURS
Status: DISCONTINUED | OUTPATIENT
Start: 2024-04-20 | End: 2024-04-21

## 2024-04-20 RX ADMIN — SERTRALINE HYDROCHLORIDE 100 MG: 100 TABLET ORAL at 07:34

## 2024-04-20 RX ADMIN — METOPROLOL SUCCINATE 25 MG: 25 TABLET, EXTENDED RELEASE ORAL at 07:33

## 2024-04-20 RX ADMIN — ACETAMINOPHEN 325 MG: 325 TABLET, FILM COATED ORAL at 07:33

## 2024-04-20 RX ADMIN — CEFTRIAXONE SODIUM 2 G: 2 INJECTION, POWDER, FOR SOLUTION INTRAMUSCULAR; INTRAVENOUS at 15:34

## 2024-04-20 RX ADMIN — BUMETANIDE 4 MG: 1 TABLET ORAL at 07:33

## 2024-04-20 RX ADMIN — VANCOMYCIN HYDROCHLORIDE 1250 MG: 5 INJECTION, POWDER, LYOPHILIZED, FOR SOLUTION INTRAVENOUS at 20:16

## 2024-04-20 RX ADMIN — ENOXAPARIN SODIUM 40 MG: 100 INJECTION SUBCUTANEOUS at 20:22

## 2024-04-20 RX ADMIN — VANCOMYCIN HYDROCHLORIDE 1500 MG: 5 INJECTION, POWDER, LYOPHILIZED, FOR SOLUTION INTRAVENOUS at 07:33

## 2024-04-20 RX ADMIN — BUMETANIDE 4 MG: 1 TABLET ORAL at 20:17

## 2024-04-20 ASSESSMENT — ACTIVITIES OF DAILY LIVING (ADL)
ADLS_ACUITY_SCORE: 45
ADLS_ACUITY_SCORE: 49
ADLS_ACUITY_SCORE: 49
ADLS_ACUITY_SCORE: 45
ADLS_ACUITY_SCORE: 45
ADLS_ACUITY_SCORE: 47
ADLS_ACUITY_SCORE: 40
ADLS_ACUITY_SCORE: 46
ADLS_ACUITY_SCORE: 40
ADLS_ACUITY_SCORE: 40
ADLS_ACUITY_SCORE: 49
PREVIOUS_RESPONSIBILITIES: MEAL PREP;HOUSEKEEPING;LAUNDRY;MEDICATION MANAGEMENT;FINANCES
ADLS_ACUITY_SCORE: 45
ADLS_ACUITY_SCORE: 40
ADLS_ACUITY_SCORE: 40
ADLS_ACUITY_SCORE: 45
ADLS_ACUITY_SCORE: 47
ADLS_ACUITY_SCORE: 45

## 2024-04-20 NOTE — PROGRESS NOTES
Patient on Bipap 14/7, 30% overnight with no changes made by this writer. Patient mask rotated last at 0600 to full face small. Skin intact. Patient appeared to sleep and tolerate well.

## 2024-04-20 NOTE — PROGRESS NOTES
04/20/24 1058   Appointment Info   Signing Clinician's Name / Credentials (PT) Shawnee Greene, PT   Quick Adds   Quick Adds Edema Eval   Living Environment   People in Home alone   Current Living Arrangements house   Home Accessibility   (ramp)   Living Environment Comments home care comes 1x/wk   Self-Care   Usual Activity Tolerance fair   Current Activity Tolerance fair   Equipment Currently Used at Home walker, standard;wheelchair, manual   General Information   Onset of Illness/Injury or Date of Surgery 04/18/24   Referring Physician Mireya Leiva MD   Patient/Family Therapy Goals Statement (PT) to return home, has been to TCU prior and does not want to go back   Pertinent History of Current Problem (include personal factors and/or comorbidities that impact the POC) L LE pain, cellulitis,   Existing Precautions/Restrictions fall   Cognition   Affect/Mental Status (Cognition) WNL   Pain Assessment   Patient Currently in Pain Yes, see Vital Sign flowsheet   Integumentary/Edema   Integumentary/Edema Comments pt has velcro compression garments noted in previous IP stay   Onset of Edema   (chronic)   Affected Body Part(s) Left LE;Right LE   Edema Etiology Infection   Edema Precautions Acute infection   Edema Precaution Comments has been on antibiotics since 4/18, per MD note it is improving   General Comments/Previous Edema Treatment/Edema Equipment has garments   Edema Examination/Assessment   Skin Condition Non-pitting;Dryness;Hemosiderin deposits;Lipodermatosclerosis   Skin Condition Comments papilomas B LEs, 1 on L lower leg medially has dried blood, has wound behind L knee, attempted to see it but as therapist trying to straighten and lift leg pt reports pain and was not able to visualize it, RN putting interdry there   Ulcerations Yes   Stemmer Sign Negative   Pitting Assessment non-pitting edema   Fibrosis Assessment fibrotic and indurated B LEs   Range of Motion (ROM)   Range of Motion ROM deficits secondary to  pain  (due to body habitus)   Strength (Manual Muscle Testing)   Strength (Manual Muscle Testing) Deficits observed during functional mobility   Bed Mobility   Comment, (Bed Mobility) attempted Ax2 supine to sit but unable even with max A and pt reporting too much pain, used ceiling lift to get to recliner, then from recliner lift again to get back to bed   Transfers   Comment, (Transfers) CGA sit to stand from recliner   Gait/Stairs (Locomotion)   [place] Level (Gait) contact guard   Assistive Device (Gait) walker, front-wheeled   Distance in Feet (Gait) 2'   Pattern (Gait) 3-point   Deviations/Abnormal Patterns (Gait) base of support, wide;leela decreased;weight shifting decreased   Clinical Impression   Criteria for Skilled Therapeutic Intervention Yes, treatment indicated   PT Diagnosis (PT) impaired functional mobility   Edema: Patient Presentation Edema;Stage 3 Lymphedema;Wounds/Ulcers   Influenced by the following impairments edema, pain, weakness, decreased flexibility   Functional limitations due to impairments impaired gait and transfers   Clinical Presentation (PT Evaluation Complexity) evolving   Clinical Presentation Rationale clinical judgement   Clinical Decision Making (Complexity) low complexity   Planned Therapy Interventions (PT) gait training;patient/family education;transfer training;home program guidelines;progressive activity/exercise;risk factor education   Edema: Planned Interventions Edema exercises;Precautions to prevent infection/exacerbation;Education;Manual therapy   Risk & Benefits of therapy have been explained evaluation/treatment results reviewed;care plan/treatment goals reviewed;risks/benefits reviewed;current/potential barriers reviewed;participants voiced agreement with care plan;patient   PT Total Evaluation Time   PT Melvinaal, Low Complexity Minutes (52341) 10   Physical Therapy Goals   PT Frequency 5x/week   PT Predicted Duration/Target Date for Goal Attainment 04/25/24    PT Goals Edema   PT: Transfers Modified independent;Sit to/from stand;Bed to/from chair;Assistive device   PT: Gait Modified independent;Rolling walker;5 feet   PT: Edema education to increase ability to manage edema after discharge from the hospital Patient;Caregiver;Verbalize;Skin care routine;signs/symptoms of intolerance;wear schedule;limb positioning;garment/bandage care;discharge recommendations   PT Discharge Planning   PT Plan Sat 1/5- transfers, gait   PT Discharge Recommendation (DC Rec) home with assist;home with home care physical therapy;Transitional Care Facility   PT Rationale for DC Rec needs to be able to do safe transfers on her own to return home, currently CGA, rec continued lymphedema therapy, PT, if cannot be indep and has no assist then would rec TCU but pt is declining TCU   PT Brief overview of current status CGA to stand and take 2 steps forward and back with walker   Total Session Time   Total Session Time (sum of timed and untimed services) 10

## 2024-04-20 NOTE — PROGRESS NOTES
[facility] Medicine Progress Note  Date of Service (when I saw the patient): 04/20/2024    REASON FOR ADMISSION / INTERVAL HISTORY:  Bess Enamorado is a 48 year old female with past medical history of severe pulmonary hypertension with HFpEF, chronic oxygen & CPAP dependence, obesity hypoventilation syndrome, recent admission for cellulitis with recent completion of IV antibiotics, anxiety, hypertension, history of pulmonary embolism, eating disorder, previous NSTEMI now presents on 4/18/2024 with leg pain. She was also found by EMS to be hypoxic with perioral cyanosis.   No new complaints-. See details below.       Assessment/ Plan     Sepsis   Left lower extremity cellulitis     Presents with left leg pain beginning overnight 4/17-4/18. Septic based on fever (100.4F, tachycardia (104bpm), and tachypnea (20/min). Lactic acid elevated (2.6).    Recently admitted 3/16-3/20/24 for left lower extremity cellulitis due to staph lugdunensis treated with IV meropenem, IV vancomycin, IV cefazolin, and then changed back to IV ancef completed outpatient 3/30/24 per ID recommendations. Felt things were nearly back to normal until 1 day PTA. Suspect source of sepsis is left leg cellulitis, concern for re-infection due to open wounds behind knee.   Started on Vancomycin/ Piperacillin-tazobactam 4.5g Q6hrs on admit-changed to meropenam 4/18   Blood cultures 4/18 x2 -growing klebsiella oxytoca/ strep gordonni/ staph epi  Started ceftriaxone/ stopped meropenam 4/19 per AMS recs..   Remains afebrile. WBC nl.  -Continue rocephin/ vanco. Follow cx.     Thrombocytopenia  Plts dropped to 89 on 4/19 from 164 on admit.   stable now at 158.     Chronic heart failure with preserved ejection fraction   Pulmonary hypertension, severe    Follows with Dr. Diaz, [facility]. Last seen 4/4/24 for severe R heart failure & critical pulm HTN with severe right-sided congestion & renal congestion causing  inability to adequately diurese. For this reason, PTA bumetanide was increased to 4mg BID (from 3mg mg daily). Subsequent plan was for addition of metolazone or subcutaneous furosemide if not diuresing with increased bumetanide.     On presentation appears euvolemic. Has central pulm congestion on xray, but CT overall clear. BNP elevated but actually improved compared to previous admission (1602).  Got 500cc fluid bolus on admission.   -continue   PTA bumetanide      Chronic respiratory failure with hypoxia   Noted by EMS to by cyanotic around lips & fingers. Initiated on BiPAP in ER. Pulse oximeter shows SPO2 consistently >90%, with VBG showing hypoxia (pO2 19), otherwise WNL. Endorses consistent, near-constant CPAP prior to EMS arrival.   CXR shows enlarged cardiac silhouette with central vascular congestion. CT PE study shows no PE, large pulmonary arterial tree consistent with pulmonary HTN. Low suspicion for acute worsening of chronic respiratory failure.   -Continue BiPAP PRN with weaning to home CPAP as able     Concern for hepatic cirrhosis  Noted incidentally on CT PE is nodular contour of liver concerning for cirrhosis. Noted during previous CT scan March 2024 but no formal diagnosis prior to that. Could be due to chronic congestion from severe pulmonary HTN. LFTs mildly elevated in mixed pattern (AST 49, total bilirubin 2.9. Remainder WNL: ALT 18, alk phos 66).   -Hepatitis B surface antibody, core antibody, & surface antigen/ Hepatitis C antibody -p       Elevated d-dimer  D-dimer on presentation mildly elevated. History of PE. CT PE study 4/18/24 negative for acute PE. Lower extremity duplex US pending to rule out DVT in lower extremities.   LE doppler US nEgative for DVT     Obesity hypoventilation syndrome on CPAP  Morbid obesity  BMP >50. Dependent on CPAP for most of the day while in recliner. Recently prescribed Zepbound injection weekly for weight loss at PCP visit 4/17/24 but has not started yet.  Initiated on BiPAP on presentation for acute respiratory distress. Discussed with RT, plan to wean to home CPAP settings ([id number]) as able.   -Continue home CPAP with stable home settings     Erythrocytosis  Mild, new. Hemoglobin baseline around 15.0, hemoglobin on presentation 16.0. Suspect hemoconcentration from decreased PO intake & increased diuresis.   Stable.     Hypertension  Normotensive on admission. Managed PTA with metoprolol succinate 25mg daily, continue with hold parameters.      Major depressive disorder   Generalized anxiety disorder  Stable. Managed PTA with sertraline 100mg daily, continue.      History of pulmonary embolism  History of right upper lobe PE discovered Feb 2022 in setting of acute covid infection, treated with xarelto which was stopped after 3 months treatment by pulmonology. Not currently on anticoagulation.      Multiple sclerosis   Very immobile. Does not follow with neurology. Not on any outpatient pharmacologic management. Symptoms stable.     DISPO  Will be in hospital 2-3 days atleast          Diet: Regular Diet Adult    DVT Prophylaxis: Enoxaparin (Lovenox) SQ  Brar Catheter: Not present  Code Status: Full Code        KEVIN OCONNOR MD   Pg [phone]        ROS:  As described in A/P and Exam.  Otherwise ALL are  negative.    PHYSICAL EXAM:  All vitals have been reviewed    Blood pressure 99/52, pulse 77, temperature 98.5  F (36.9  C), temperature source Axillary, resp. rate 21, weight (!) 151 kg (332 lb 14.3 oz), SpO2 92%, not currently breastfeeding.    I/O this shift:  In: 100 [P.O.:100]  Out: 1100 [Urine:1100]    GENERAL APPEARANCE: morbidly obese, alert and no distress  EYES: conjunctiva clear, eyes grossly normal  HENT: external ears and nose normal   RESP: lungs clear to auscultation - no rales, rhonchi or wheezes  CV: regular rate and rhythm, normal S1 S2, no S3 or S4 and no murmur, click or rub   ABDOMEN: soft, nontender, no HSM or masses and bowel sounds  normal  MS: no clubbing, cyanosis; massive lymphedema B-with oozing fluid / few open blisters L leg-erythema L foot/ lower leg  SKIN: clear without significant rashes or lesions  NEURO: -non-focal moves all 4 extr    ROUTINE  LABS (Last four results)  CMP  Recent Labs   Lab 04/19/24  0649 04/18/24  2343 04/18/24  1954 04/18/24  1147 04/18/24  0510 04/17/24  0924     --   --   --  137 137   POTASSIUM 3.6  --   --   --  4.9 4.0   CHLORIDE 103  --   --   --  100 100   CO2 23  --   --   --  24 28   ANIONGAP 11  --   --   --  13 9   GLC 98 89 88 97 107* 87   BUN 15.7  --   --   --  17.4 18.4   CR 0.88  --   --   --  0.66 0.72   GFRESTIMATED 80  --   --   --  >90 >90   EVITA 9.0  --   --   --  10.0 9.9   PROTTOTAL 6.7  --   --   --  8.5*  --    ALBUMIN 2.9*  --   --   --  3.9  --    BILITOTAL 4.8*  --   --   --  2.9*  --    ALKPHOS 51  --   --   --  66  --    AST 19  --   --   --  49*  --    ALT 11  --   --   --  18  --      CBC  Recent Labs   Lab 04/20/24  1141 04/19/24  0532 04/18/24  0456   WBC 5.7 7.2 7.8   RBC 5.06 5.18 5.78*   HGB 14.0 14.4 16.0*   HCT 44.4 46.4 49.8*   MCV 88 90 86   MCH 27.7 27.8 27.7   MCHC 31.5 31.0* 32.1   RDW 17.5* 17.6* 18.5*    89* 164     INRNo lab results found in last 7 days.  Arterial Blood Gas  Recent Labs   Lab 04/19/24  0533 04/18/24  0456   O2PER 30 80       No results found for this or any previous visit (from the past 24 hour(s)).

## 2024-04-20 NOTE — PLAN OF CARE
End Of Shift Note  Subjective/Objective:     Neuro: A&O x4. Denies pain except with incontinence cares.      Cardiac: SR. Normotensive. Afebrile.      Resp: LS diminished. BIPAP overnight. 5LNC when off BIPAP     GI/: No BM this shift. Regular diet. Purewick- changed twice      MSK: ceiling lift w/2-3 assist, moderately impaired mobility.      Skin: No changes. Per plan of care.      LDAs: PIV x 2 SL     Problem: Adult Inpatient Plan of Care  Goal: Optimal Comfort and Wellbeing  Outcome: Progressing  Intervention: Provide Person-Centered Care  Recent Flowsheet Documentation  Taken 4/20/2024 0400 by Florina Rico, RN  Trust Relationship/Rapport: care explained  Taken 4/20/2024 0000 by Florina Rico, RN  Trust Relationship/Rapport: care explained  Taken 4/19/2024 2000 by Florina Rico, RN  Trust Relationship/Rapport: care explained     Problem: Infection  Goal: Absence of Infection Signs and Symptoms  Outcome: Progressing   Goal Outcome Evaluation: Abx, incontinence care.

## 2024-04-20 NOTE — PLAN OF CARE
Lymphedema- pt with continued high pain in L LE, redness, and has only had slight improvement, will check tomorrow to see if greater improvement then will start wrapping legs, she does have her velcro garments here so that can be donned on her R LE.

## 2024-04-20 NOTE — PROGRESS NOTES
04/20/24 1100   Appointment Info   Signing Clinician's Name / Credentials (OT) Shazia Rodriguez OTR/L   Living Environment   People in Home alone   Current Living Arrangements   ([facility])   Living Environment Comments home 1x week with OT for lymphedema   Self-Care   Equipment Currently Used at Home grab bar, tub/shower;shower chair;commode chair;walker, rolling;wheelchair, manual  (2 commodes one in kitchen, one in living room)   Instrumental Activities of Daily Living (IADL)   Previous Responsibilities meal prep;housekeeping;laundry;medication management;finances   IADL Comments mom, sister and brother assist with heavy cleaning   General Information   Onset of Illness/Injury or Date of Surgery 04/18/24   Referring Physician Dr Leiva   Patient/Family Therapy Goal Statement (OT) to return home with assist from family   Additional Occupational Profile Info/Pertinent History of Current Problem Bess Enamorado is a 48 year old female who has multiple sclerosis, morbid obesity, hypertension, NARCISA, depression, diastolic heart failure, coronary artery disease, history of PE not on anticoagulation, history of left leg cellulitis who presents to the emergency department via EMS for evaluation of leg pain and shortness of breath.  Patient states that she was in her normal state of health until about 2 AM when she woke up this morning and was having pain in her left lower extremity.  She also felt short of breath.  When medics arrived, they found the patient with oxygen saturations in the 40s and she was cyanotic.  They placed her on 10 L nonrebreather and oxygen durations came up to the 80s.  Patient has been alert and oriented the entire time.  She denies chest pain.  She denies fever.  No abdominal pain.  States she has been compliant with her medications.  She states she thinks she has an infection in her left leg again.Bess Enamorado is a 48 year old female who has multiple sclerosis, morbid obesity, hypertension, NARCISA,  depression, diastolic heart failure, coronary artery disease, history of PE not on anticoagulation, history of left leg cellulitis who presents to the emergency department via EMS for evaluation of leg pain and shortness of breath.  Patient states that she was in her normal state of health until about 2 AM when she woke up this morning and was having pain in her left lower extremity.  She also felt short of breath.  When medics arrived, they found the patient with oxygen saturations in the 40s and she was cyanotic.  They placed her on 10 L nonrebreather and oxygen durations came up to the 80s.  Patient has been alert and oriented the entire time.  She denies chest pain.  She denies fever.  No abdominal pain.  States she has been compliant with her medications.  She states she thinks she has an infection in her left leg again.   Left Lower Extremity (Weight-bearing Status) weight-bearing as tolerated (WBAT)   Right Lower Extremity (Weight-bearing Status) weight-bearing as tolerated (WBAT)   General Observations and Info pt sleeps in a recliner at home and mainly stand pivots to commode and recliner   Cognitive Status Examination   Orientation Status orientation to person, place and time   Cognitive Status Comments no major deficits noted during assessment   Pain Assessment   Patient Currently in Pain Yes, see Vital Sign flowsheet  (c/o pain between thighs mainly on L LE)   Range of Motion Comprehensive   Comment, General Range of Motion WFL   Strength Comprehensive (MMT)   Comment, General Manual Muscle Testing (MMT) Assessment WFL for transfers   Bed Mobility   Bed Mobility supine-sit   Supine-Sit [place] (Bed Mobility) maximum assist (25% patient effort);2 person assist   Comment (Bed Mobility) pt does not sleep in bed at home   Transfers   Transfer Comments Pt unable to transfer from hospital bed being to short to get feet to ground .   Sit-Stand Transfer   Sit-Stand [place] (Transfers) contact guard;2  person assist   Sit/Stand Transfer Comments Pt able to stand from recliner with CGA x2 persons  not having been up lately.   Clinical Impression   Criteria for Skilled Therapeutic Interventions Met (OT) Yes, treatment indicated   OT Diagnosis weakness   Influenced by the following impairments LE dressing, toileting, commode transfer   OT Problem List-Impairments impacting ADL problems related to;activity tolerance impaired;mobility;strength;pain   Assessment of Occupational Performance 3-5 Performance Deficits   Identified Performance Deficits LB dressing, sit to stand, commode transfer, toileting, LB dressing   Clinical Decision Making Complexity (OT) detailed assessment/moderate complexity   Risk & Benefits of therapy have been explained evaluation/treatment results reviewed;care plan/treatment goals reviewed;current/potential barriers reviewed;risks/benefits reviewed;participants voiced agreement with care plan;participants included;patient   Clinical Impression Comments Pt in extreme pain with L LE between thighs , with generalized weakness noted effecting transfers and BADLs   OT Total Evaluation Time   OT Eval, Moderate Complexity Minutes (07830) 40   OT Goals   Therapy Frequency (OT) 5 times/week   OT Predicted Duration/Target Date for Goal Attainment 04/27/24   OT Goals OT Goal 1   OT: Lower Body Dressing using adaptive equipment;Modified independent   OT: Toilet Transfer/Toileting Modified independent;cleaning and garment management;using adaptive equipment   OT: Goal 1 Pt will perform BUE HEP for increased strengh for ind with transfers and BADLS prior to d/c   Therapeutic Activities   Therapeutic Activity Minutes (90871) 15   Symptoms noted during/after treatment increased pain   Treatment Detail/Skilled Intervention Attempted to transfer after max A x2 supine to sitting in bed with pt unable to reach feet to floor . Pt ceiling lifted to chair with pt able to stand with CGA x2 and take few steps forward  and back. Pt unable to sit back in recliner due to alexander wick not working with thighs too tight in chair pt stating and LEs unable to be elevated for edema if seated with forward with feet on floor and thighs more comfortable appart. Pt in extreme pain when transfering with ceiling lift with L thigh pain   OT Discharge Planning   OT Plan POC 1/5 Sat LE dressing, commode transfer, toileting   OT Discharge Recommendation (DC Rec) Transitional Care Facility;home with home care occupational therapy   OT Rationale for DC Rec Recommend TCU for increased overall strength, pain control and edema control with pt wanting to return home with  OT. Pt states has someone who would be able to stay with her initially   OT Brief overview of current status CGA sit to stand from recliner Ax2   Total Session Time   Timed Code Treatment Minutes 15   Total Session Time (sum of timed and untimed services) 55

## 2024-04-20 NOTE — PLAN OF CARE
End Of Shift Note        Neuro: PERRL. Aox4. Pleasant. Neuro intact. No HA or dizziness.      Cardiac: Platelets low. Held Lovenox and lab came and got another CBC. T. max 98.5. NSR in the 70-80's. SBPs . Significant lymphedema in the legs. Poor perfusion with dusky blue toes- more so on the right leg. Positioning adjusted, wraps on and off. Wound care done. Declined heat beyond warm blankets as it irritates her      Resp: Lungs clear on dim. On Bipap at 30% NOC. Once up she was on %L per NC,  when it comes off she desats to mid to low 80's otherwise trends in the low 90's. Has a dry cough. Denies any congestion or rhinorrhea. Intermittent tachypnea with activity and cares. SOB and turns blue when flat or with significant activity.      GI/: Abdomen soft. Bowel sound present. Last BM the 18th. Denies nausea, cramps heart burn. She ate about 25% of her meal this morning and then states she is full. Declined other options. Sipping on apple juice and coffee. Pure wick in replaced since it has only been working intermittently.      MSK: Generalized weakness- She got up and took several steps forward and back with PT. Up to the chair for a bit but stayed less than 2 hours.      Skin: redness on nose. Tegaderm in place to protect. Red and irritated tight in places. Dusky in peripheral extremities. Wounds. Dryness. Washed lower extremities with wash clothes and soap after incontinence, slathered in barrier cream. Cleansed and dried between folds and she either has powder or inter dry replaced. Slathered legs in Aquaphor. Braided her hair.      LDAs: 2 PIV's. Pure wick.        Goal Outcome Evaluation:      Plan of Care Reviewed With: patient    Overall Patient Progress: improvingOverall Patient Progress: improving

## 2024-04-20 NOTE — MEDICATION SCRIBE - ADMISSION MEDICATION HISTORY
Medication Scribe Admission Medication History    Admission medication history is complete. The information provided in this note is only as accurate as the sources available at the time of the update.    Information Source(s): Patient and CareEverywhere/SureScripts via  with patient in room and finished at desk.    Pertinent Information: Using Gold Bond Medicated instead of Miconazole that she has not picked up yet.  Furosemide was discussed with her doctor but she has not been told about any finalized plans on taking the medication.    Changes made to PTA medication list:  Added: Gold [place] Medicated.  Deleted: None  Changed: None    Allergies reviewed with patient and updates made in EHR: yes, no change.    Medication History Completed By: Christa Olivas 4/20/2024 10:28 AM    PTA Med List   Medication Sig Note Last Dose    acetaminophen (TYLENOL) 650 MG CR tablet Take 650 mg by mouth every 8 hours as needed for mild pain or fever  4/18/2024 at am    aspirin (ASPIRIN LOW DOSE) 81 MG EC tablet Take 1 tablet (81 mg) by mouth daily  4/17/2024 at am    bumetanide (BUMEX) 2 MG tablet Take 2 tablets (4 mg) by mouth 2 times daily  4/17/2024 at 1400    Emollient (GOLD BOND MEDICATED BODY EX) Externally apply 1 Application topically 2 times daily as needed  4/16/2024 at am    Furosemide (FUROSCIX) 80 MG/10ML CTKT Inject 80 mg Subcutaneous daily for 6 days 4/18/2024: Doesn't take yet not on yet at not finalized    metoprolol succinate ER (TOPROL XL) 25 MG 24 hr tablet Take 1 tablet (25 mg) by mouth daily  4/17/2024 at am    miconazole (MICATIN) 2 % AERP powder Apply topically 2 times daily as needed  new RX at not on yet    potassium chloride sheila ER (KLOR-CON M20) 20 MEQ CR tablet Take 1 tablet (20 mEq) by mouth 2 times daily  4/17/2024 at pm    sertraline (ZOLOFT) 100 MG tablet Take 1 tablet (100 mg) by mouth daily  4/17/2024 at am    tirzepatide-Weight Management (ZEPBOUND) 2.5 MG/0.5ML prefilled pen Inject 0.5 mLs (2.5  mg) Subcutaneous every 7 days 4/18/2024: Not started yet not on yet at [facility] med

## 2024-04-21 ENCOUNTER — APPOINTMENT (OUTPATIENT)
Dept: PHYSICAL THERAPY | Facility: CLINIC | Age: 49
DRG: 872 | End: 2024-04-21
Payer: MEDICARE

## 2024-04-21 ENCOUNTER — APPOINTMENT (OUTPATIENT)
Dept: OCCUPATIONAL THERAPY | Facility: CLINIC | Age: 49
DRG: 872 | End: 2024-04-21
Payer: MEDICARE

## 2024-04-21 LAB
BACTERIA BLD CULT: ABNORMAL

## 2024-04-21 PROCEDURE — 999N000157 HC STATISTIC RCP TIME EA 10 MIN

## 2024-04-21 PROCEDURE — 250N000011 HC RX IP 250 OP 636: Performed by: INTERNAL MEDICINE

## 2024-04-21 PROCEDURE — 250N000013 HC RX MED GY IP 250 OP 250 PS 637

## 2024-04-21 PROCEDURE — 97530 THERAPEUTIC ACTIVITIES: CPT | Mod: GP | Performed by: PHYSICAL THERAPIST

## 2024-04-21 PROCEDURE — 250N000013 HC RX MED GY IP 250 OP 250 PS 637: Performed by: INTERNAL MEDICINE

## 2024-04-21 PROCEDURE — 250N000011 HC RX IP 250 OP 636

## 2024-04-21 PROCEDURE — 94660 CPAP INITIATION&MGMT: CPT

## 2024-04-21 PROCEDURE — 97535 SELF CARE MNGMENT TRAINING: CPT | Mod: GO

## 2024-04-21 PROCEDURE — 99233 SBSQ HOSP IP/OBS HIGH 50: CPT | Performed by: INTERNAL MEDICINE

## 2024-04-21 PROCEDURE — 99207 PR CDG-CUT & PASTE-POTENTIAL IMPACT ON LEVEL: CPT | Performed by: INTERNAL MEDICINE

## 2024-04-21 PROCEDURE — 200N000001 HC R&B ICU

## 2024-04-21 RX ORDER — BUMETANIDE 1 MG/1
2 TABLET ORAL 2 TIMES DAILY
Status: DISCONTINUED | OUTPATIENT
Start: 2024-04-21 | End: 2024-04-23 | Stop reason: HOSPADM

## 2024-04-21 RX ADMIN — BUMETANIDE 2 MG: 1 TABLET ORAL at 20:13

## 2024-04-21 RX ADMIN — ENOXAPARIN SODIUM 40 MG: 100 INJECTION SUBCUTANEOUS at 20:13

## 2024-04-21 RX ADMIN — HYDROMORPHONE HYDROCHLORIDE 0.3 MG: 1 INJECTION, SOLUTION INTRAMUSCULAR; INTRAVENOUS; SUBCUTANEOUS at 11:04

## 2024-04-21 RX ADMIN — ACETAMINOPHEN 325 MG: 325 TABLET, FILM COATED ORAL at 08:33

## 2024-04-21 RX ADMIN — CEFTRIAXONE SODIUM 2 G: 2 INJECTION, POWDER, FOR SOLUTION INTRAMUSCULAR; INTRAVENOUS at 16:39

## 2024-04-21 RX ADMIN — BUMETANIDE 2 MG: 1 TABLET ORAL at 08:30

## 2024-04-21 RX ADMIN — SERTRALINE HYDROCHLORIDE 100 MG: 100 TABLET ORAL at 08:33

## 2024-04-21 RX ADMIN — ENOXAPARIN SODIUM 40 MG: 100 INJECTION SUBCUTANEOUS at 08:33

## 2024-04-21 RX ADMIN — METOPROLOL SUCCINATE 25 MG: 25 TABLET, EXTENDED RELEASE ORAL at 08:33

## 2024-04-21 ASSESSMENT — ACTIVITIES OF DAILY LIVING (ADL)
ADLS_ACUITY_SCORE: 47

## 2024-04-21 NOTE — PROGRESS NOTES
BIPAP remained on SB at this shift. Pt was on 3-5L NC sating 92-94%. No changes made at this shift. Rt will continue to monitor.

## 2024-04-21 NOTE — PLAN OF CARE
End Of Shift Note  Subjective/Objective:     Neuro: A&O x4. Denies pain except with incontinence cares.      Cardiac: SR. Normotensive. Afebrile.      Resp: LS diminished. BIPAP overnight. 4LNC when off BIPAP     GI/: No BM this shift. Regular diet. Purewick in place.      MSK: 2-3 assist, moderately impaired mobility.      Skin: No changes. Per plan of care.      LDAs: PIV x 2     Problem: Adult Inpatient Plan of Care  Goal: Optimal Comfort and Wellbeing  Outcome: Progressing  Intervention: Provide Person-Centered Care  Recent Flowsheet Documentation  Taken 4/21/2024 0400 by Florina Rico, RN  Trust Relationship/Rapport: care explained  Taken 4/20/2024 2320 by Florina Rico, RN  Trust Relationship/Rapport: care explained  Taken 4/20/2024 2020 by Florina Rico, RN  Trust Relationship/Rapport: care explained   Goal Outcome Evaluation: BIPAP overnight.

## 2024-04-21 NOTE — PHARMACY-VANCOMYCIN DOSING SERVICE
Pharmacy Vancomycin Note  Date of Service 2024  Patient's  1975   48 year old, female    Indication: Skin and Soft Tissue Infection  Day of Therapy: 3  Current vancomycin regimen:  1500 mg IV q12h  Current vancomycin monitoring method: AUC  Current vancomycin therapeutic monitoring goal: 400-600 mg*h/L    Insight[employer] Prediction of Current Vancomycin Regimen  Regimen: 1500 mg IV every 12 hours.  Start time: 05:09 on 2024  Exposure target: AUC24 (range)400-600 mg/L.hr   AUC24,ss: 599 mg/L.hr  Probability of AUC24 > 400: 78 %  Ctrough,ss: 16 mg/L  Probability of Ctrough,ss > 20: 40 %  Probability of nephrotoxicity (Lodise ARMANDO ): 11 %    Current estimated CrCl = Estimated Creatinine Clearance: 113.8 mL/min (based on SCr of 0.88 mg/dL).    Creatinine for last 3 days  2024:  5:10 AM Creatinine 0.66 mg/dL  2024:  6:49 AM Creatinine 0.88 mg/dL    Recent Vancomycin Levels (past 3 days)  2024:  6:29 PM Vancomycin 16.0 ug/mL    Vancomycin IV Administrations (past 72 hours)                     vancomycin (VANCOCIN) 1,500 mg in sodium chloride 0.9 % 250 mL intermittent infusion (mg) 1,500 mg New Bag 24 0733     1,500 mg New Bag 24 1754     1,500 mg New Bag  0459     1,500 mg New Bag 24 1757     1,500 mg New Bag  0514                    Nephrotoxins and other renal medications (From now, onward)      Start     Dose/Rate Route Frequency Ordered Stop    24  vancomycin (VANCOCIN) 1,250 mg in sodium chloride 0.9 % 250 mL intermittent infusion         1,250 mg  over 90 Minutes Intravenous EVERY 12 HOURS 24 19424  bumetanide (BUMEX) tablet 4 mg        Note to Pharmacy: PTA Sig:Take 2 tablets (4 mg) by mouth 2 times daily      4 mg Oral 2 TIMES DAILY 24 1218                 Contrast Orders - past 72 hours (72h ago, onward)      Start     Dose/Rate Route Frequency Stop    24 0530  iopamidol (ISOVUE-370) solution 96 mL         96 mL  Intravenous ONCE 04/18/24 0557            Interpretation of levels and current regimen:  Vancomycin level is reflective of AUC greater than 600    Has serum creatinine changed greater than 50% in last 72 hours: No    Urine output:  unable to determine    Renal Function: Stable    Insight[employer] Prediction of Planned New Vancomycin Regimen  Regimen: 1250 mg IV every 12 hours.  Start time: 19:43 on 04/20/2024  Exposure target: AUC24 (range)400-600 mg/L.hr   AUC24,ss: 511 mg/L.hr  Probability of AUC24 > 400: 92 %  Ctrough,ss: 13 mg/L  Probability of Ctrough,ss > 20: 0 %  Probability of nephrotoxicity (Lodise ARMANDO 2009): 8 %    Plan:  Decrease Dose to 1250 mg IV every 12 hours.  Vancomycin monitoring method: AUC  Vancomycin therapeutic monitoring goal: 400-600 mg*h/L  Pharmacy will check vancomycin levels as appropriate in 1-3 Days.  Serum creatinine levels will be ordered a minimum of twice weekly.    Silas Chaney RPH

## 2024-04-21 NOTE — PROGRESS NOTES
Patient placed on Bipap 14/7, 30% for the night per nursing.  Patient has tolerated well. No changes in settings.  Mask interface changed @ 0400 check per policy. Mask contact points intact. Patient has been wearing NC 4- 5 liters during the day.  Will cont to titr.

## 2024-04-21 NOTE — PROGRESS NOTES
End Of Shift Note        Neuro: PERRL. Aox4. Pleasant. Neuro intact. No HA or dizziness.  No changes to hearing or vision. Pleasant and calm today.     Cardiac:  Got Lovenox this AM,  T. max 98.6. NSR in the 70-80's. SBPs . Significant lymphedema continues in the legs. Poor perfusion with dusky blue toes- more so on the right leg. Positioning adjusted, wraps on and off.      Resp: Lungs clear on dim. On Bipap at 30% 14/7  NOC. Once up she was on 3-5 L per NC at rest up to 8 when flat/turning  when it comes off she desats to mid to low 80's otherwise trends in the low 90's. Has a dry cough. Slight congestion and getting some thick clear sputum up today. Denies any sore throat or rhinorrhea. Intermittent tachypnea with activity and cares. SOB and turns blue when flat or with significant activity.      GI/: Bess states that she only wants her 2 mg of home dose Bumex- states she has tried to communicate that that is double dose and that there has not been a good reason given to her for why she needs to be on double dose. Pure wick in replaced since it has only been working intermittently.        Abdomen soft. Bowel sound present. Last BM the 18th. Sates she often does not go for 4 days and this is her usual Bowel routine and it is not causing any discomfort. Denies nausea, cramps heart burn. She ate about 50% of her meal this morning and then states she is full. Declined other options. Sipping on apple juice and coffee.     MSK: Generalized weakness- She got up and took several steps with PT/OT. . Up to the chair for over 2 hours today.        Skin:. Red and irritated tight in places. Dusky in peripheral extremities. Wounds. Dryness. Washed entire body again today  with wash clothes and soap after incontinence, slathered in barrier cream. Cleansed and dried between folds with wound cleanser and she either has powder or inter dry replaced after they were well dried. Slathered legs in Aquaphor and lotion.      LDAs: 2 PIV's. Pure wick.

## 2024-04-22 ENCOUNTER — MEDICAL CORRESPONDENCE (OUTPATIENT)
Dept: HEALTH INFORMATION MANAGEMENT | Facility: CLINIC | Age: 49
End: 2024-04-22
Payer: MEDICARE

## 2024-04-22 ENCOUNTER — APPOINTMENT (OUTPATIENT)
Dept: PHYSICAL THERAPY | Facility: CLINIC | Age: 49
DRG: 872 | End: 2024-04-22
Payer: MEDICARE

## 2024-04-22 ENCOUNTER — APPOINTMENT (OUTPATIENT)
Dept: OCCUPATIONAL THERAPY | Facility: CLINIC | Age: 49
DRG: 872 | End: 2024-04-22
Payer: MEDICARE

## 2024-04-22 LAB
ALBUMIN SERPL BCG-MCNC: 2.9 G/DL (ref 3.5–5.2)
ALP SERPL-CCNC: 62 U/L (ref 40–150)
ALT SERPL W P-5'-P-CCNC: 12 U/L (ref 0–50)
ANION GAP SERPL CALCULATED.3IONS-SCNC: 8 MMOL/L (ref 7–15)
AST SERPL W P-5'-P-CCNC: 17 U/L (ref 0–45)
BACTERIA UR CULT: ABNORMAL
BASOPHILS # BLD AUTO: 0.1 10E3/UL (ref 0–0.2)
BASOPHILS NFR BLD AUTO: 1 %
BILIRUB SERPL-MCNC: 1.5 MG/DL
BUN SERPL-MCNC: 9.2 MG/DL (ref 6–20)
CALCIUM SERPL-MCNC: 9.2 MG/DL (ref 8.6–10)
CHLORIDE SERPL-SCNC: 100 MMOL/L (ref 98–107)
CREAT SERPL-MCNC: 0.62 MG/DL (ref 0.51–0.95)
DEPRECATED HCO3 PLAS-SCNC: 29 MMOL/L (ref 22–29)
EGFRCR SERPLBLD CKD-EPI 2021: >90 ML/MIN/1.73M2
EOSINOPHIL # BLD AUTO: 0.3 10E3/UL (ref 0–0.7)
EOSINOPHIL NFR BLD AUTO: 7 %
ERYTHROCYTE [DISTWIDTH] IN BLOOD BY AUTOMATED COUNT: 17.2 % (ref 10–15)
GLUCOSE SERPL-MCNC: 89 MG/DL (ref 70–99)
HCT VFR BLD AUTO: 44.4 % (ref 35–47)
HGB BLD-MCNC: 14 G/DL (ref 11.7–15.7)
IMM GRANULOCYTES # BLD: 0 10E3/UL
IMM GRANULOCYTES NFR BLD: 0 %
INR PPP: 1.35 (ref 0.85–1.15)
LYMPHOCYTES # BLD AUTO: 1.1 10E3/UL (ref 0.8–5.3)
LYMPHOCYTES NFR BLD AUTO: 26 %
MCH RBC QN AUTO: 27.7 PG (ref 26.5–33)
MCHC RBC AUTO-ENTMCNC: 31.5 G/DL (ref 31.5–36.5)
MCV RBC AUTO: 88 FL (ref 78–100)
MONOCYTES # BLD AUTO: 0.5 10E3/UL (ref 0–1.3)
MONOCYTES NFR BLD AUTO: 12 %
NEUTROPHILS # BLD AUTO: 2.2 10E3/UL (ref 1.6–8.3)
NEUTROPHILS NFR BLD AUTO: 53 %
NRBC # BLD AUTO: 0 10E3/UL
NRBC BLD AUTO-RTO: 0 /100
PLATELET # BLD AUTO: 157 10E3/UL (ref 150–450)
POTASSIUM SERPL-SCNC: 3.1 MMOL/L (ref 3.4–5.3)
POTASSIUM SERPL-SCNC: 3.6 MMOL/L (ref 3.4–5.3)
PROT SERPL-MCNC: 7 G/DL (ref 6.4–8.3)
RBC # BLD AUTO: 5.06 10E6/UL (ref 3.8–5.2)
SODIUM SERPL-SCNC: 137 MMOL/L (ref 135–145)
WBC # BLD AUTO: 4.2 10E3/UL (ref 4–11)

## 2024-04-22 PROCEDURE — 97535 SELF CARE MNGMENT TRAINING: CPT | Mod: GO

## 2024-04-22 PROCEDURE — 85610 PROTHROMBIN TIME: CPT | Performed by: INTERNAL MEDICINE

## 2024-04-22 PROCEDURE — 80053 COMPREHEN METABOLIC PANEL: CPT | Performed by: INTERNAL MEDICINE

## 2024-04-22 PROCEDURE — 97140 MANUAL THERAPY 1/> REGIONS: CPT | Mod: GP

## 2024-04-22 PROCEDURE — 36415 COLL VENOUS BLD VENIPUNCTURE: CPT | Performed by: INTERNAL MEDICINE

## 2024-04-22 PROCEDURE — 250N000011 HC RX IP 250 OP 636: Performed by: INTERNAL MEDICINE

## 2024-04-22 PROCEDURE — 84132 ASSAY OF SERUM POTASSIUM: CPT | Performed by: INTERNAL MEDICINE

## 2024-04-22 PROCEDURE — 85025 COMPLETE CBC W/AUTO DIFF WBC: CPT | Performed by: INTERNAL MEDICINE

## 2024-04-22 PROCEDURE — 250N000013 HC RX MED GY IP 250 OP 250 PS 637: Performed by: INTERNAL MEDICINE

## 2024-04-22 PROCEDURE — 200N000001 HC R&B ICU

## 2024-04-22 PROCEDURE — 250N000013 HC RX MED GY IP 250 OP 250 PS 637

## 2024-04-22 RX ORDER — POTASSIUM CHLORIDE 1500 MG/1
40 TABLET, EXTENDED RELEASE ORAL ONCE
Status: COMPLETED | OUTPATIENT
Start: 2024-04-22 | End: 2024-04-22

## 2024-04-22 RX ADMIN — BUMETANIDE 2 MG: 1 TABLET ORAL at 20:32

## 2024-04-22 RX ADMIN — ENOXAPARIN SODIUM 40 MG: 100 INJECTION SUBCUTANEOUS at 08:29

## 2024-04-22 RX ADMIN — CEFTRIAXONE SODIUM 2 G: 2 INJECTION, POWDER, FOR SOLUTION INTRAMUSCULAR; INTRAVENOUS at 16:01

## 2024-04-22 RX ADMIN — SERTRALINE HYDROCHLORIDE 100 MG: 100 TABLET ORAL at 08:29

## 2024-04-22 RX ADMIN — METOPROLOL SUCCINATE 25 MG: 25 TABLET, EXTENDED RELEASE ORAL at 08:29

## 2024-04-22 RX ADMIN — POTASSIUM CHLORIDE 40 MEQ: 1500 TABLET, EXTENDED RELEASE ORAL at 12:14

## 2024-04-22 RX ADMIN — BUMETANIDE 2 MG: 1 TABLET ORAL at 08:29

## 2024-04-22 RX ADMIN — ENOXAPARIN SODIUM 40 MG: 100 INJECTION SUBCUTANEOUS at 20:33

## 2024-04-22 ASSESSMENT — ACTIVITIES OF DAILY LIVING (ADL)
ADLS_ACUITY_SCORE: 47
ADLS_ACUITY_SCORE: 50
ADLS_ACUITY_SCORE: 47
ADLS_ACUITY_SCORE: 48
ADLS_ACUITY_SCORE: 47

## 2024-04-22 NOTE — PLAN OF CARE
Lymphedema  Therapy has initiated edema treatment consisting of Gradient Compression Bandaging applied to LLE from MTPs to knee creases. Edema therapist will change compression wraps every 24-48 hours.     Nursing to remove wraps if:  - wraps are causing pain/numbness   - wraps become soiled  - wraps become loose or are falling off  - there are areas of bunching up and rolling (want to avoid risk of tourniquet effect)  - patient has a change in level of alertness or status impacting ability to communicate pain  - worsening respiratory status     If wraps become soiled, do not throw away.  Place in plastic bag for edema therapist.    Contact inpatient edema therapy (rehabilitation services) with questions or if there is a change in condition.     Lymphedema Therapy will return Tuesday 4/23/24 to reassess.

## 2024-04-22 NOTE — PROGRESS NOTES
Assisted patient with her auto titr CPAP.  6 liters O2 added.  Will adjust Fio2 if needed.  Patient on for the night.

## 2024-04-22 NOTE — PROGRESS NOTES
[facility] Medicine Progress Note  Date of Service (when I saw the patient): 04/22/2024    REASON FOR ADMISSION / INTERVAL HISTORY:  Bess Enamorado is a 48 year old female with past medical history of severe pulmonary hypertension with HFpEF, chronic oxygen & CPAP dependence, obesity hypoventilation syndrome, recent admission for cellulitis with recent completion of IV antibiotics, anxiety, hypertension, history of pulmonary embolism, eating disorder, previous NSTEMI now presents on 4/18/2024 with leg pain. She was also found by EMS to be hypoxic with perioral cyanosis.   No new complaints-. See details below.       Assessment/ Plan     Sepsis   Left lower extremity cellulitis     Presents with left leg pain beginning overnight 4/17-4/18. Septic based on fever (100.4F, tachycardia (104bpm), and tachypnea (20/min). Lactic acid elevated (2.6).    Recently admitted 3/16-3/20/24 for left lower extremity cellulitis due to staph lugdunensis treated with IV meropenem, IV vancomycin, IV cefazolin, and then changed back to IV ancef completed outpatient 3/30/24 per ID recommendations. Felt things were nearly back to normal until 1 day PTA. Suspect source of sepsis is left leg cellulitis, concern for re-infection due to open wounds behind knee.   Started on Vancomycin/ Piperacillin-tazobactam 4.5g Q6hrs on admit-changed to meropenam 4/18   Blood cultures 4/18 x2 -growing klebsiella oxytoca/ strep gordonni/ staph epi NOT  MRSE  Started ceftriaxone/ stopped meropenam 4/19 per AMS recs..   Remains afebrile. WBC nl. Discussed with ID today. Could go home on po antbx eventually  -Continue rocephin/. Continue to  Follow cx. Home on po antbx in 1-2 days    Thrombocytopenia  Plts dropped to 89 on 4/19 from 164 on admit.   Normalized 4/20    Hypokalemia  Replace per protocol     Chronic heart failure with preserved ejection fraction   Pulmonary hypertension, severe    Follows with Dr. Diaz,  [facility]. Last seen 4/4/24 for severe R heart failure & critical pulm HTN with severe right-sided congestion & renal congestion causing inability to adequately diurese. For this reason, PTA bumetanide was increased to 4mg BID (from 3mg mg daily). Subsequent plan was for addition of metolazone or subcutaneous furosemide if not diuresing with increased bumetanide.   -continue   PTA bumetanide      Chronic respiratory failure with hypoxia   Noted by EMS to by cyanotic around lips & fingers. Initiated on BiPAP in ER. Pulse oximeter shows SPO2 consistently >90%, with VBG showing hypoxia (pO2 19), otherwise WNL. Endorses consistent, near-constant CPAP prior to EMS arrival.   CXR shows enlarged cardiac silhouette with central vascular congestion. CT PE study shows no PE, large pulmonary arterial tree consistent with pulmonary HTN. Low suspicion for acute worsening of chronic respiratory failure.   -Continue  home CPAP -pt has no issues with her CPAP at home. Sleeps on recliner     Concern for hepatic cirrhosis  Noted incidentally on CT PE is nodular contour of liver concerning for cirrhosis. Noted during previous CT scan March 2024 but no formal diagnosis prior to that. Could be due to chronic congestion from severe pulmonary HTN. LFTs mildly elevated in mixed pattern (AST 49, total bilirubin 2.9. Remainder WNL: ALT 18, alk phos 66).   Hepatitis B surface antibody-reactive, core antibody non reactive, & surface antigen/ Hepatitis C antibody negative. Could follow OP with GI.       Elevated d-dimer  D-dimer on presentation mildly elevated. History of PE. CT PE study 4/18/24 negative for acute PE. Lower extremity duplex US pending to rule out DVT in lower extremities.   LE doppler US nEgative for DVT     Obesity hypoventilation syndrome on CPAP  Morbid obesity  BMP >50. Dependent on CPAP for most of the day while in recliner. Recently prescribed Zepbound injection weekly for weight loss at PCP visit 4/17/24 but  has not started yet. Initiated on BiPAP on presentation for acute respiratory distress. Discussed with RT, plan to wean to home CPAP settings (10-26hrW4L) as able.   -Continue home CPAP with stable home settings     Erythrocytosis  Mild, new. Hemoglobin baseline around 15.0, hemoglobin on presentation 16.0. Suspect hemoconcentration from decreased PO intake & increased diuresis.   Stable.     Hypertension  Normotensive on admission. Managed PTA with metoprolol succinate 25mg daily, continue with hold parameters.      Major depressive disorder   Generalized anxiety disorder  Stable. Managed PTA with sertraline 100mg daily, continue.      History of pulmonary embolism  History of right upper lobe PE discovered Feb 2022 in setting of acute covid infection, treated with xarelto which was stopped after 3 months treatment by pulmonology. Not currently on anticoagulation.      Multiple sclerosis   Very immobile. Does not follow with neurology. Not on any outpatient pharmacologic management. Symptoms stable.     DISPO  Home with HC in 1-2 days when things arranged at home-pt also needs her mother home-lives 3-4 hrs away.           Diet: Regular Diet Adult    DVT Prophylaxis: Enoxaparin (Lovenox) SQ  Brar Catheter: Not present  Code Status: Full Code        KEVIN OCONNOR MD   Pg [phone]        ROS:  As described in A/P and Exam.  Otherwise ALL are  negative.    PHYSICAL EXAM:  All vitals have been reviewed    Blood pressure 98/71, pulse 73, temperature 98  F (36.7  C), temperature source Oral, resp. rate 18, weight 146.8 kg (323 lb 10.2 oz), SpO2 95%, not currently breastfeeding.    I/O this shift:  In: 480 [P.O.:480]  Out: 2000 [Urine:2000]    GENERAL APPEARANCE: morbidly obese, alert and no distress  EYES: conjunctiva clear, eyes grossly normal  HENT: external ears and nose normal   RESP: lungs clear to auscultation - no rales, rhonchi or wheezes  CV: regular rate and rhythm, normal S1 S2, no S3 or S4 and no murmur,  click or rub   ABDOMEN: soft, nontender, no HSM or masses and bowel sounds normal  MS: no clubbing, cyanosis; massive lymphedema B-with oozing fluid / few open blisters L leg-erythema L foot/ lower leg  SKIN: clear without significant rashes or lesions  NEURO: -non-focal moves all 4 extr    ROUTINE  LABS (Last four results)  CMP  Recent Labs   Lab 04/22/24  0717 04/19/24  0649 04/18/24  2343 04/18/24  1954 04/18/24  1147 04/18/24  0510 04/17/24  0924    137  --   --   --  137 137   POTASSIUM 3.1* 3.6  --   --   --  4.9 4.0   CHLORIDE 100 103  --   --   --  100 100   CO2 29 23  --   --   --  24 28   ANIONGAP 8 11  --   --   --  13 9   GLC 89 98 89 88   < > 107* 87   BUN 9.2 15.7  --   --   --  17.4 18.4   CR 0.62 0.88  --   --   --  0.66 0.72   GFRESTIMATED >90 80  --   --   --  >90 >90   EVITA 9.2 9.0  --   --   --  10.0 9.9   PROTTOTAL 7.0 6.7  --   --   --  8.5*  --    ALBUMIN 2.9* 2.9*  --   --   --  3.9  --    BILITOTAL 1.5* 4.8*  --   --   --  2.9*  --    ALKPHOS 62 51  --   --   --  66  --    AST 17 19  --   --   --  49*  --    ALT 12 11  --   --   --  18  --     < > = values in this interval not displayed.     CBC  Recent Labs   Lab 04/22/24  0717 04/20/24  1141 04/19/24  0532 04/18/24  0456   WBC 4.2 5.7 7.2 7.8   RBC 5.06 5.06 5.18 5.78*   HGB 14.0 14.0 14.4 16.0*   HCT 44.4 44.4 46.4 49.8*   MCV 88 88 90 86   MCH 27.7 27.7 27.8 27.7   MCHC 31.5 31.5 31.0* 32.1   RDW 17.2* 17.5* 17.6* 18.5*    158 89* 164     INR  Recent Labs   Lab 04/22/24  0717   INR 1.35*     Arterial Blood Gas  Recent Labs   Lab 04/19/24  0533 04/18/24  0456   O2PER 30 80       No results found for this or any previous visit (from the past 24 hour(s)).

## 2024-04-22 NOTE — PLAN OF CARE
End Of Shift Note      Subjective/Objective:    Neuro: A&OX4, following commands, using call light appropriately    Cardiac: apical pulse regular, denies chest pain    Resp: lung sounds clear bilaterally, remains on supplemental oxygen 4L/min via nasal canula, with oxygen sats mid 90's.  Using patient's own CPAP when sleeping.    GI/: voiding spontaneously, purewick in place.    MSK: patient remained in bed all shift, has refused bed repositioning and has refused to get out of bed for meals.     Skin: has open excoriated areas under abdominal folds bilaterally, and behind left knee, areas cleansed with micro kleanser and interdry,     LDAs: PIV X1    Goal Outcome Evaluation:      Plan of Care Reviewed With: patient    Overall Patient Progress: improvingOverall Patient Progress: improving       Problem: Adult Inpatient Plan of Care  Goal: Absence of Hospital-Acquired Illness or Injury  Intervention: Identify and Manage Fall Risk  Recent Flowsheet Documentation  Taken 4/22/2024 0820 by Kell Murry RN  Safety Promotion/Fall Prevention:   activity supervised   clutter free environment maintained   lighting adjusted   mobility aid in reach   nonskid shoes/slippers when out of bed   patient and family education   room organization consistent   safety round/check completed   supervised activity   treat reversible contributory factors   treat underlying cause   assistive device/personal items within reach     Problem: Adult Inpatient Plan of Care  Goal: Absence of Hospital-Acquired Illness or Injury  Intervention: Prevent Skin Injury  Recent Flowsheet Documentation  Taken 4/22/2024 1217 by Kell Murry, RN  Body Position:   supine, legs elevated   supine, head elevated     Problem: Gas Exchange Impaired  Goal: Optimal Gas Exchange  Intervention: Optimize Oxygenation and Ventilation  Recent Flowsheet Documentation  Taken 4/22/2024 1217 by Kell Murry, RN  Head of Bed (HOB) Positioning: HOB at 30-45 degrees  Taken  4/22/2024 0820 by Kell Murry RN  Airway/Ventilation Management:   airway patency maintained   calming measures promoted   pulmonary hygiene promoted     Problem: Skin Injury Risk Increased  Goal: Skin Health and Integrity  Intervention: Plan: Nurse Driven Intervention: Moisture Management  Recent Flowsheet Documentation  Taken 4/22/2024 1015 by Kell Murry RN  Bathing/Skin Care: patient refused     Problem: Skin Injury Risk Increased  Goal: Skin Health and Integrity  Intervention: Optimize Skin Protection  Recent Flowsheet Documentation  Taken 4/22/2024 1217 by Kell Murry RN  Activity Management: (refusing to get out of bed for lunch)   activity adjusted per tolerance   patient refuses activity  Head of Bed (HOB) Positioning: HOB at 30-45 degrees  Taken 4/22/2024 1010 by Kell Murry RN  Activity Management: (pt refused to get out of bed for lunch) patient refuses activity

## 2024-04-22 NOTE — PLAN OF CARE
End Of Shift Note  Subjective/Objective:     Neuro: A&O x4. Denies pain except with incontinence cares.      Cardiac: Normotensive. Afebrile.      Resp: LS diminished. Home CPAP overnight WITH 15L. 4-6LNC when off cpap     GI/: No BM this shift. Regular diet. Purewick in place.      MSK: 2-3 assist, moderately impaired mobility.      Skin: No changes. Per plan of care.      LDAs: PIV x 1     Problem: Adult Inpatient Plan of Care  Goal: Optimal Comfort and Wellbeing  Outcome: Progressing  Intervention: Provide Person-Centered Care  Recent Flowsheet Documentation  Taken 4/22/2024 0400 by Florina Rico, RN  Trust Relationship/Rapport: care explained  Taken 4/22/2024 0000 by Florina Rico, RN  Trust Relationship/Rapport: care explained  Taken 4/21/2024 2000 by Florina Rico, RN  Trust Relationship/Rapport: care explained   Goal Outcome Evaluation:  Plan of Care Reviewed With: patient   Overall Patient Progress: improving

## 2024-04-22 NOTE — PROGRESS NOTES
Care Management Follow Up    Length of Stay (days): 4    Expected Discharge Date: 04/23/2024     Concerns to be Addressed: discharge planning     Patient plan of care discussed at interdisciplinary rounds: Yes    Anticipated Discharge Disposition: Home, Home Care     Anticipated Discharge Services: None  Anticipated Discharge DME: None    Patient/family educated on Medicare website which has current facility and service quality ratings: no  Education Provided on the Discharge Plan: Yes  Patient/Family in Agreement with the Plan: yes    Referrals Placed by CM/SW:    Private pay costs discussed: Not applicable    Additional Information:  Per MD at IDT rounds pt is NOT medically stable for discharge today. Bedside RN asked CM to see pt bedside to confirm discharge plan.    CM met with patient bedside, pt declining going to TCU at this time, pt feels safe going home with homecare services, patients mom who lives in [place] will be coming to see patient tomorrow and will stay with patient upon discharge to assist pt with cares as needed going home. Patient concerned about toileting at TCU, pt previous was at a TCU and staff took too long to assist. Per patient at home she has a recliner chair and is independent with toileting and cares. Patient has her own routine at home and feels comfortable with this plan.  Patient would like to go wheelchair transport due to lymphedema in legs.    Plan: home with assist of patients mother and resume [facility] (Phone: [phone]) RN,PT,OT-lymphedema    Transport: wheelchair    Aimee Donald RNCM  Care Transitions Registered Nurse  Tele: [phone]

## 2024-04-23 ENCOUNTER — APPOINTMENT (OUTPATIENT)
Dept: PHYSICAL THERAPY | Facility: CLINIC | Age: 49
DRG: 872 | End: 2024-04-23
Payer: MEDICARE

## 2024-04-23 ENCOUNTER — TELEPHONE (OUTPATIENT)
Dept: FAMILY MEDICINE | Facility: CLINIC | Age: 49
End: 2024-04-23
Payer: MEDICARE

## 2024-04-23 ENCOUNTER — APPOINTMENT (OUTPATIENT)
Dept: OCCUPATIONAL THERAPY | Facility: CLINIC | Age: 49
DRG: 872 | End: 2024-04-23
Payer: MEDICARE

## 2024-04-23 VITALS
DIASTOLIC BLOOD PRESSURE: 81 MMHG | HEART RATE: 71 BPM | BODY MASS INDEX: 54.76 KG/M2 | OXYGEN SATURATION: 95 % | TEMPERATURE: 97.1 F | WEIGHT: 293 LBS | SYSTOLIC BLOOD PRESSURE: 111 MMHG | RESPIRATION RATE: 18 BRPM

## 2024-04-23 PROCEDURE — 250N000011 HC RX IP 250 OP 636: Performed by: INTERNAL MEDICINE

## 2024-04-23 PROCEDURE — 99239 HOSP IP/OBS DSCHRG MGMT >30: CPT | Performed by: INTERNAL MEDICINE

## 2024-04-23 PROCEDURE — 250N000013 HC RX MED GY IP 250 OP 250 PS 637

## 2024-04-23 PROCEDURE — 97530 THERAPEUTIC ACTIVITIES: CPT | Mod: GP

## 2024-04-23 PROCEDURE — 250N000013 HC RX MED GY IP 250 OP 250 PS 637: Performed by: INTERNAL MEDICINE

## 2024-04-23 PROCEDURE — 97535 SELF CARE MNGMENT TRAINING: CPT | Mod: GO

## 2024-04-23 RX ORDER — CEFDINIR 300 MG/1
300 CAPSULE ORAL 2 TIMES DAILY
Qty: 14 CAPSULE | Refills: 0 | Status: ON HOLD | OUTPATIENT
Start: 2024-04-23 | End: 2024-05-01

## 2024-04-23 RX ADMIN — METOPROLOL SUCCINATE 25 MG: 25 TABLET, EXTENDED RELEASE ORAL at 08:17

## 2024-04-23 RX ADMIN — CEFTRIAXONE SODIUM 2 G: 2 INJECTION, POWDER, FOR SOLUTION INTRAMUSCULAR; INTRAVENOUS at 13:35

## 2024-04-23 RX ADMIN — ENOXAPARIN SODIUM 40 MG: 100 INJECTION SUBCUTANEOUS at 08:17

## 2024-04-23 RX ADMIN — BUMETANIDE 2 MG: 1 TABLET ORAL at 08:17

## 2024-04-23 RX ADMIN — SERTRALINE HYDROCHLORIDE 100 MG: 100 TABLET ORAL at 08:17

## 2024-04-23 ASSESSMENT — ACTIVITIES OF DAILY LIVING (ADL)
ADLS_ACUITY_SCORE: 44
ADLS_ACUITY_SCORE: 46
ADLS_ACUITY_SCORE: 46
ADLS_ACUITY_SCORE: 47
ADLS_ACUITY_SCORE: 44
ADLS_ACUITY_SCORE: 44
ADLS_ACUITY_SCORE: 46
ADLS_ACUITY_SCORE: 43
ADLS_ACUITY_SCORE: 44
ADLS_ACUITY_SCORE: 47
ADLS_ACUITY_SCORE: 44
ADLS_ACUITY_SCORE: 43
ADLS_ACUITY_SCORE: 47
ADLS_ACUITY_SCORE: 47

## 2024-04-23 NOTE — PROGRESS NOTES
Care Management Discharge Note    Discharge Date: 04/23/2024     Discharge Disposition: Home with [facility] (Phone: [phone]) RN, PT/OT (lymp) and HHA services.    Discharge Services: None    Discharge DME: None    Discharge Transportation: agency    Private pay costs discussed: transportation costs    Does the patient's insurance plan have a 3 day qualifying hospital stay waiver?  No    PAS Confirmation Code:  NA  Patient/family educated on Medicare website which has current facility and service quality ratings: no    Education Provided on the Discharge Plan: Yes  Persons Notified of Discharge Plans: Patient, home care team, medical team  Patient/Family in Agreement with the Plan: yes    Handoff Referral Completed: Yes    Additional Information:  Per IDT rounds, MD states that pt is medically stable for discharge today.    PT/OT initially recommended that pt go to a TCU, however, now recommending pt can return home with family support and resumption of home care services. Pt/family are in agreement.    Pt is accepted for cares with [facility] (Phone: [phone]) for RN, PT/OT (lymph) and HHA services.    Transportation discussed and [facility] wheelchair with oxygen to transport.  Pt & family are aware of the costs associated with [facility] transportation.    Pt requested to make her own follow up appointment with PCP.      KAY Lal

## 2024-04-23 NOTE — PROGRESS NOTES
Patient on home cpap 10-16 CMH2O auto titr. With 4 liters O2 added. Patient tolerated well without desats with head of bed raised compared to previous night.     Patient with one or more new problems requiring additional work-up/treatment.

## 2024-04-23 NOTE — PROGRESS NOTES
[place] [facility] DISCHARGE NOTE    Patient discharged to home at 3:59 PM via wheel chair. Accompanied by other:[facility] transport staff with oxygen. Discharge instructions reviewed with patient, opportunity offered to ask questions. Prescriptions sent to patients preferred pharmacy. All belongings sent with patient.    Sandra Tran RN

## 2024-04-23 NOTE — PLAN OF CARE
Physical Therapy and Lymphedema Therapy Discharge Summary    Reason for therapy discharge:    Discharged to home with home therapy.    Progress towards therapy goal(s). See goals on Care Plan in [facility] electronic health record for goal details.  Goals met    Therapy recommendation(s):    Continued therapy is recommended.  Rationale/Recommendations:  Recommend continued PT and lymphedema home care therapy to increase indep and safety in own environment and progress edema back to baseline home garment use.

## 2024-04-23 NOTE — TELEPHONE ENCOUNTER
Reason for Call:  Appointment Request    Patient requesting this type of appt:  Hospital/ED Follow-Up     Requested provider: Mikala Luna    Reason patient unable to be scheduled: Not within requested timeframe    When does patient want to be seen/preferred time: 3-7 days    Comments: hospital f/u: [facility]. Sepsis related to celuitits left leg. Admitted 04/18/-004/23. Dr. Clark       Could we send this information to you in [employer] or would you prefer to receive a phone call?:   Patient would prefer a phone call   Okay to leave a detailed message?: Yes at Cell number on file:    Telephone Information:   Mobile [phone]       Call taken on 4/23/2024 at 1:51 PM by KALPANA BAILON

## 2024-04-23 NOTE — PLAN OF CARE
"  Problem: Adult Inpatient Plan of Care  Goal: Plan of Care Review  Description: The Plan of Care Review/Shift note should be completed every shift.  The Outcome Evaluation is a brief statement about your assessment that the patient is improving, declining, or no change.  This information will be displayed automatically on your shift  note.  Outcome: Progressing  Flowsheets (Taken 4/23/2024 0631)  Outcome Evaluation: Output of yellow clear urine total of 2675 this 12 hour shift after 2000 dose of Bumex obtained in  pure-wick external catheter. new cath placed at HS and working well overnight.  Only slightly damp when checking skin condition every 2 hours.  slept very soundly with Home CPAP on through the night.  Weight down only 0.7 kg since 24 hours ago.  .  Plan of Care Reviewed With: patient  Overall Patient Progress: improving  Goal: Patient-Specific Goal (Individualized)  Description: You can add care plan individualizations to a care plan. Examples of Individualization might be:  \"Parent requests to be called daily at 9am for status\", \"I have a hard time hearing out of my right ear\", or \"Do not touch me to wake me up as it startles  me\".  Outcome: Progressing  Flowsheets (Taken 4/22/2024 2000)  Individualized Care Needs: denies needs at this time  Goal: Absence of Hospital-Acquired Illness or Injury  Outcome: Progressing  Intervention: Identify and Manage Fall Risk  Recent Flowsheet Documentation  Taken 4/23/2024 0415 by Johanson, Cindy, RN  Safety Promotion/Fall Prevention:   activity supervised   assistive device/personal items within reach   increased rounding and observation   increase visualization of patient   lighting adjusted   room near nurse's station  Taken 4/23/2024 0400 by Johanson, Cindy, RN  Safety Promotion/Fall Prevention:   activity supervised   assistive device/personal items within reach   increased rounding and observation   increase visualization of patient   lighting adjusted   room near " nurse's station  Taken 4/23/2024 0000 by Johanson, Cindy, RN  Safety Promotion/Fall Prevention:   activity supervised   assistive device/personal items within reach   increased rounding and observation   increase visualization of patient   lighting adjusted   room near nurse's station  Taken 4/22/2024 2000 by Johanson, Cindy, RN  Safety Promotion/Fall Prevention:   activity supervised   assistive device/personal items within reach   increased rounding and observation   increase visualization of patient   lighting adjusted   room near nurse's station  Intervention: Prevent Skin Injury  Recent Flowsheet Documentation  Taken 4/23/2024 0415 by Johanson, Cindy, RN  Body Position:   position maintained   heels elevated   legs elevated  Skin Protection: incontinence pads utilized  Device Skin Pressure Protection:   absorbent pad utilized/changed   positioning supports utilized  Taken 4/23/2024 0400 by Johanson, Cindy, RN  Body Position: position maintained  Taken 4/23/2024 0000 by Johanson, Cindy, RN  Body Position:   position maintained   heels elevated   legs elevated  Skin Protection: incontinence pads utilized  Device Skin Pressure Protection:   absorbent pad utilized/changed   positioning supports utilized  Taken 4/22/2024 2200 by Johanson, Cindy, RN  Body Position:   log-rolled   position maintained   neutral body alignment   weight shifting  Taken 4/22/2024 2000 by Johanson, Cindy, RN  Body Position:   log-rolled   position maintained   turned   neutral head position   neutral body alignment   sitting up in bed   weight shifting   upper extremity elevated  Skin Protection: incontinence pads utilized  Device Skin Pressure Protection:   absorbent pad utilized/changed   positioning supports utilized  Intervention: Prevent and Manage VTE (Venous Thromboembolism) Risk  Recent Flowsheet Documentation  Taken 4/23/2024 0415 by Johanson, Cindy, RN  VTE Prevention/Management: SCDs (sequential compression devices) off  Taken  4/23/2024 0400 by Johanson, Cindy, RN  VTE Prevention/Management: SCDs (sequential compression devices) off  Taken 4/23/2024 0000 by Johanson, Cindy, RN  VTE Prevention/Management: SCDs (sequential compression devices) off  Taken 4/22/2024 2000 by Johanson, Cindy, RN  VTE Prevention/Management: SCDs (sequential compression devices) off  Intervention: Prevent Infection  Recent Flowsheet Documentation  Taken 4/23/2024 0415 by Johanson, Cindy, RN  Infection Prevention:   environmental surveillance performed   equipment surfaces disinfected   hand hygiene promoted   rest/sleep promoted   single patient room provided  Taken 4/23/2024 0400 by Johanson, Cindy, RN  Infection Prevention:   environmental surveillance performed   equipment surfaces disinfected   hand hygiene promoted   rest/sleep promoted   single patient room provided  Taken 4/23/2024 0000 by Johanson, Cindy, RN  Infection Prevention:   environmental surveillance performed   equipment surfaces disinfected   hand hygiene promoted   rest/sleep promoted   single patient room provided  Taken 4/22/2024 2000 by Johanson, Cindy, RN  Infection Prevention:   environmental surveillance performed   equipment surfaces disinfected   hand hygiene promoted   rest/sleep promoted   single patient room provided  Goal: Optimal Comfort and Wellbeing  Outcome: Progressing  Intervention: Provide Person-Centered Care  Recent Flowsheet Documentation  Taken 4/23/2024 0415 by Johanson, Cindy, RN  Trust Relationship/Rapport:   care explained   choices provided   thoughts/feelings acknowledged  Taken 4/23/2024 0000 by Johanson, Cindy, RN  Trust Relationship/Rapport:   care explained   choices provided   thoughts/feelings acknowledged  Taken 4/22/2024 2000 by Johanson, Cindy, RN  Trust Relationship/Rapport:   care explained   choices provided   thoughts/feelings acknowledged  Goal: Readiness for Transition of Care  Outcome: Progressing     Problem: Gas Exchange Impaired  Goal: Optimal  Gas Exchange  Outcome: Progressing  Intervention: Optimize Oxygenation and Ventilation  Recent Flowsheet Documentation  Taken 4/23/2024 0415 by Johanson, Cindy, RN  Airway/Ventilation Management:   airway patency maintained   calming measures promoted   pulmonary hygiene promoted  Head of Bed (HOB) Positioning: HOB at 30-45 degrees  Taken 4/23/2024 0400 by Johanson, Cindy, RN  Head of Bed (HOB) Positioning: HOB at 30-45 degrees  Taken 4/23/2024 0000 by Johanson, Cindy, RN  Airway/Ventilation Management:   airway patency maintained   calming measures promoted   pulmonary hygiene promoted  Head of Bed (HOB) Positioning: HOB at 30-45 degrees  Taken 4/22/2024 2200 by Johanson, Cindy, RN  Head of Bed (HOB) Positioning: HOB at 30-45 degrees  Taken 4/22/2024 2000 by Johanson, Cindy, RN  Airway/Ventilation Management:   airway patency maintained   calming measures promoted   pulmonary hygiene promoted  Head of Bed (HOB) Positioning: HOB at 20-30 degrees     Problem: Infection  Goal: Absence of Infection Signs and Symptoms  Outcome: Progressing     Problem: Skin Injury Risk Increased  Goal: Skin Health and Integrity  Outcome: Progressing  Intervention: Plan: Nurse Driven Intervention: Moisture Management  Recent Flowsheet Documentation  Taken 4/23/2024 0415 by Johanson, Cindy, RN  Moisture Interventions:   No brief in bed   Incontinence pad   Urinary collection device  Taken 4/23/2024 0000 by Johanson, Cindy, RN  Moisture Interventions:   No brief in bed   Incontinence pad   Urinary collection device  Taken 4/22/2024 2000 by Johanson, Cindy, RN  Moisture Interventions:   No brief in bed   Incontinence pad   Urinary collection device  Intervention: Plan: Nurse Driven Intervention: Friction and Shear  Recent Flowsheet Documentation  Taken 4/23/2024 0415 by Johanson, Cindy, RN  Friction/Shear Interventions: HOB 30 degrees or less  Taken 4/23/2024 0000 by Johanson, Cindy, RN  Friction/Shear Interventions: HOB 30 degrees or  less  Taken 4/22/2024 2000 by Johanson, Cindy, RN  Friction/Shear Interventions: HOB 30 degrees or less  Intervention: Optimize Skin Protection  Recent Flowsheet Documentation  Taken 4/23/2024 0415 by Johanson, Cindy, RN  Skin Protection: incontinence pads utilized  Activity Management: activity adjusted per tolerance  Head of Bed (HOB) Positioning: HOB at 30-45 degrees  Taken 4/23/2024 0400 by Johanson, Cindy, RN  Activity Management: activity adjusted per tolerance  Head of Bed (HOB) Positioning: HOB at 30-45 degrees  Taken 4/23/2024 0000 by Johanson, Cindy, RN  Skin Protection: incontinence pads utilized  Activity Management: activity adjusted per tolerance  Head of Bed (HOB) Positioning: HOB at 30-45 degrees  Taken 4/22/2024 2200 by Johanson, Cindy, RN  Activity Management:   activity adjusted per tolerance   bedrest  Head of Bed (HOB) Positioning: HOB at 30-45 degrees  Taken 4/22/2024 2000 by Johanson, Cindy, RN  Skin Protection: incontinence pads utilized  Activity Management: (log rolled)   activity adjusted per tolerance   bedrest   dorsiflexion/plantar flexion performed   other (see comments)  Head of Bed (HOB) Positioning: HOB at 20-30 degrees   Goal Outcome Evaluation:      Plan of Care Reviewed With: patient    Overall Patient Progress: improvingOverall Patient Progress: improving    Outcome Evaluation: Output of yellow clear urine total of 2675 this 12 hour shift after 2000 dose of Bumex obtained in  pure-wick external catheter. new cath placed at HS and working well overnight.  Only slightly damp when checking skin condition every 2 hours.  slept very soundly with Home CPAP on through the night.  Weight down only 0.7 kg since 24 hours ago.  .

## 2024-04-23 NOTE — TELEPHONE ENCOUNTER
RN called patient and scheduled her for a Hospital Follow up with Dr. Luna on 5/1/24.    Isabella Salinas RN on 4/23/2024 at 2:12 PM

## 2024-04-23 NOTE — DISCHARGE INSTRUCTIONS
*Left posterior knee wound:  Use Interdry Ag as below. If cannot access area daily due to lymphedema wraps then reduce change frequency to coincide with lymphedema wrap change day.    Recommend Interdry Ag also for abd fold if able.    1. Wash skin gently. Pat dry, do not rub.   2. Cut the appropriate size of fabric with clean scissors, allowing a minimum of 2 inches of the fabric exposed outside the skin fold for moisture evaporation.   3. Lay a single layer of fabric in the skin fold, placing one edge of the fabric into the base of the fold. Gently smooth the rest of the fabric over the skin, keeping it flat. Leave at least 2 inches of the fabric exposed outside the skin fold.   4. Secure the fabric in one of several ways; with the skin fold, with a small amount of tape, or tucked under clothing.   When to Dispose: Each piece of InterDry may be used up to 5 days, depending on fabric soiling, amount of moisture and general skin condition may need to replace more often where skin is denuded. May label with skin marker to date     Removal: Remove the fabric before bathing and reuse when finished. When removing the fabric from skin folds, gently separate the skin fold and lift away fabric.     *Interdry Ag is not covered by insurance and must be purchased privately, may order on the Internet. If this is not feasible and wound is still present, alternative product to use for posterior knee wound would be Silvercel alginate wound dressing, use this as above but does not need to hand out of the skin fold.     If wound is not healing, recommend referral to outpatient wound care clinic for follow-up. This could be done at the [facility] with JULIETA Disla, CNP, [phone]   no

## 2024-04-23 NOTE — DISCHARGE SUMMARY
[facility]  Hospitalist Discharge Summary       Date of Admission:  4/18/2024  Date of Discharge:  4/23/2024  3:55 PM  Discharging Provider: Agapito Urias MD      Discharge Diagnoses     Sepsis   Left lower extremity cellulitis   Thrombocytopenia  Hypokalemia  Chronic heart failure with preserved ejection fraction   Pulmonary hypertension, severe  Chronic respiratory failure with hypoxia   Concern for hepatic cirrhosis  Elevated d-dimer  Obesity hypoventilation syndrome on CPAP  Morbid obesity  Erythrocytosis  Hypertension  Major depressive disorder   Generalized anxiety disorder  History of pulmonary embolism  Multiple sclerosis     Follow-ups Needed After Discharge   Follow-up Appointments     Follow-up and recommended labs and tests       Follow up with primary care provider, Mikala Luna, within 7 days for   hospital follow- up.  The following labs/tests are recommended: CBC and   CMP.          Unresulted Labs Ordered in the Past 30 Days of this Admission       Date and Time Order Name Status Description    4/19/2024  7:49 AM Blood Culture Hand, Left Preliminary     4/19/2024  7:49 AM Blood Culture Hand, Left Preliminary         These results will be followed up by Agapito Urias or PCP    Discharge Disposition   Discharged to home  Condition at discharge: Stable    Hospital Course    Bess Enamorado is a 48 year old female with past medical history of severe pulmonary hypertension with HFpEF, chronic oxygen & CPAP dependence, obesity hypoventilation syndrome, recent admission for cellulitis with recent completion of IV antibiotics, anxiety, hypertension, history of pulmonary embolism, eating disorder, previous NSTEMI now presents on 4/18/2024 with leg pain. She was also found by EMS to be hypoxic with perioral cyanosis.     Sepsis   Left lower extremity cellulitis     Presents with left leg pain beginning overnight 4/17-4/18. Septic based on fever (100.4F, tachycardia (104bpm),  and tachypnea (20/min). Lactic acid elevated (2.6).    Recently admitted 3/16-3/20/24 for left lower extremity cellulitis due to staph lugdunensis treated with IV meropenem, IV vancomycin, IV cefazolin, and then changed back to IV ancef completed outpatient 3/30/24 per ID recommendations. Felt things were nearly back to normal until 1 day PTA. Suspect source of sepsis is left leg cellulitis, concern for re-infection due to open wounds behind knee.   Started on Vancomycin/ Piperacillin-tazobactam 4.5g Q6hrs on admit-changed to meropenam 4/18   Blood cultures 4/18 x2 -growing klebsiella oxytoca/ strep gordonni/ staph epi NOT  MRSE  Started ceftriaxone/ stopped meropenam 4/19 per AMS recs..   Remains afebrile. WBC nl. Discussed with ID today. Could go home on po antbx eventually  -Treated with Rocephin during hospitalization, discharged home on course of cefdinir     Thrombocytopenia  Plts dropped to 89 on 4/19 from 164 on admit.   Normalized 4/20     Hypokalemia  Replace per protocol     Chronic heart failure with preserved ejection fraction   Pulmonary hypertension, severe    Follows with Dr. Diaz, [place] [facility]. Last seen 4/4/24 for severe R heart failure & critical pulm HTN with severe right-sided congestion & renal congestion causing inability to adequately diurese. For this reason, PTA bumetanide was increased to 4mg BID (from 3mg mg daily). Subsequent plan was for addition of metolazone or subcutaneous furosemide if not diuresing with increased bumetanide.   -continue  PTA bumetanide   -Follow-up with cardiology    Chronic respiratory failure with hypoxia   Noted by EMS to by cyanotic around lips & fingers. Initiated on BiPAP in ER. Pulse oximeter shows SPO2 consistently >90%, with VBG showing hypoxia (pO2 19), otherwise WNL. Endorses consistent, near-constant CPAP prior to EMS arrival.   CXR shows enlarged cardiac silhouette with central vascular congestion. CT PE study shows no PE, large pulmonary  arterial tree consistent with pulmonary HTN. Low suspicion for acute worsening of chronic respiratory failure.   -Continue  home CPAP -pt has no issues with her CPAP at home. Sleeps on recliner     Concern for hepatic cirrhosis  Noted incidentally on CT PE is nodular contour of liver concerning for cirrhosis. Noted during previous CT scan March 2024 but no formal diagnosis prior to that. Could be due to chronic congestion from severe pulmonary HTN. LFTs mildly elevated in mixed pattern (AST 49, total bilirubin 2.9. Remainder WNL: ALT 18, alk phos 66).   -Hepatitis B surface antibody-reactive, core antibody non reactive, & surface antigen negative  -Hepatitis C antibody negative.   -Patient request referral to gastroenterology for evaluation of cirrhosis seen on CT     Elevated d-dimer  D-dimer on presentation mildly elevated. History of PE. CT PE study 4/18/24 negative for acute PE. Lower extremity duplex US pending to rule out DVT in lower extremities.   LE doppler US negative for DVT     Obesity hypoventilation syndrome on CPAP  Morbid obesity  BMP >50. Dependent on CPAP for most of the day while in recliner. Recently prescribed Zepbound injection weekly for weight loss at PCP visit 4/17/24 but has not started yet. Initiated on BiPAP on presentation for acute respiratory distress. Discussed with RT, plan to wean to home CPAP settings ([id number]) as able.   -Continue home CPAP with stable home settings     Erythrocytosis  Mild, new. Hemoglobin baseline around 15.0, hemoglobin on presentation 16.0. Suspect hemoconcentration from decreased PO intake & increased diuresis.   Stable.     Hypertension  Normotensive on admission. Managed PTA with metoprolol succinate 25mg daily, continue with hold parameters.      Major depressive disorder   Generalized anxiety disorder  Stable. Managed PTA with sertraline 100mg daily, continue.      History of pulmonary embolism  History of right upper lobe PE discovered Feb 2022 in  setting of acute covid infection, treated with xarelto which was stopped after 3 months treatment by pulmonology. Not currently on anticoagulation.      Multiple sclerosis   Very immobile. Does not follow with neurology. Not on any outpatient pharmacologic management. Symptoms stable.     Consultations This Hospital Stay   PHARMACY TO DOSE VANCO  WOUND OSTOMY CONTINENCE NURSE  IP CONSULT  CARE MANAGEMENT / SOCIAL WORK IP CONSULT  PHYSICAL THERAPY ADULT IP CONSULT  OCCUPATIONAL THERAPY ADULT IP CONSULT  LYMPHEDEMA THERAPY IP CONSULT    Code Status   Full Code    40 MINUTES SPENT BY ME on the date of service doing chart review, history, exam, documentation & further activities per the note.         Agapito Urias MD  [facility]  ______________________________________________________________________    Physical Exam   Vital Signs: Temp: 97.1  F (36.2  C) Temp src: Oral BP: 111/81 Pulse: 64   Resp: 18 SpO2: 95 % O2 Device: Nasal cannula Oxygen Delivery: 4 LPM  Weight: 319 lbs .09 oz       Gen: Debilitated obese female, alert and oriented x 3, no acute distressed  HEENT: Atraumatic, normocephalic; sclera non-injected, anicterric; oral mucosa moist, no lesion, no exudate  Lungs: Clear to ausculation, no wheezes, no rhonchi, no rales  Heart: Regular rate, regular rhythm, no gallops, no rubs, no murmurs  GI: Bowel sound normal, no hepatosplenomegaly, no masses, non-tender, non-distended, no guarding, no rebound tenderness  Lymph: No lymphadenopathy, 1+ BLE edema  Skin: No rashes, no chronic venous stasis     Primary Care Physician   Mikala Luna    Discharge Orders      Follow-Up with Cardiology ANNAMARIE Heart Failure Discharge      Home Care Referral      Primary Care - Care Coordination Referral      Adult GI  Referral - Consult Only      Reason for your hospital stay    This is a 48 year old female admitted with cellulitis.     Follow-up and recommended labs and tests     Follow up  with primary care provider, Mikala Luna, within 7 days for hospital follow- up.  The following labs/tests are recommended: CBC and CMP.     Activity    Your activity upon discharge: activity as tolerated     Full Code     Diet    Follow this diet upon discharge: Orders Placed This Encounter      Regular Diet Adult         Significant Results and Procedures   Most Recent 3 CBC's:  Recent Labs   Lab Test 04/22/24  0717 04/20/24  1141 04/19/24  0532   WBC 4.2 5.7 7.2   HGB 14.0 14.0 14.4   MCV 88 88 90    158 89*     Most Recent 3 BMP's:  Recent Labs   Lab Test 04/22/24  1708 04/22/24  0717 04/19/24  0649 04/18/24  2343 04/18/24  1147 04/18/24  0510   NA  --  137 137  --   --  137   POTASSIUM 3.6 3.1* 3.6  --   --  4.9   CHLORIDE  --  100 103  --   --  100   CO2  --  29 23  --   --  24   BUN  --  9.2 15.7  --   --  17.4   CR  --  0.62 0.88  --   --  0.66   ANIONGAP  --  8 11  --   --  13   EVITA  --  9.2 9.0  --   --  10.0   GLC  --  89 98 89   < > 107*    < > = values in this interval not displayed.   ,   Results for orders placed or performed during the hospital encounter of 04/18/24   XR Chest Port 1 View    Narrative    EXAM: XR CHEST PORT 1 VIEW  LOCATION: [facility]  DATE: 4/18/2024    INDICATION: Hypoxic respiratory failure  COMPARISON: 03/16/2024      Impression    IMPRESSION: Enlarged cardiac silhouette, stable. Central vascular congestion. No edema, consolidation, pleural effusion, or pneumothorax.   CT Chest Pulmonary Embolism w Contrast    Narrative    EXAM: CT CHEST PULMONARY EMBOLISM W CONTRAST  LOCATION: [facility]  DATE: 4/18/2024    INDICATION: Hypoxic respiratory failure, elevated D dimer  COMPARISON: Chest radiograph today. CTA chest 03/16/2024.  TECHNIQUE: CT chest pulmonary angiogram during arterial phase injection of IV contrast. Multiplanar reformats and MIP reconstructions were performed. Dose reduction techniques were used.    CONTRAST: 96 mL Isovue 370    FINDINGS:  ANGIOGRAM CHEST: No pulmonary embolus. Chronic large size of the pulmonary arterial tree consistent with pulmonary arterial hypertension. No aortic aneurysm or dissection.    LUNGS AND PLEURA: Normal.    MEDIASTINUM/AXILLAE: Calcification of the mitral annulus.    CORONARY ARTERY CALCIFICATION: None.    UPPER ABDOMEN: Postcholecystectomy. Nodular contour of the liver concerning for cirrhosis.    MUSCULOSKELETAL: Degenerative changes of the spine.      Impression    IMPRESSION:  1.  No pulmonary embolus or other acute process in the chest.  2.  Large size of the pulmonary arterial tree consistent with pulmonary arterial hypertension.  3.  Hepatic cirrhosis.   POC US CHEST B-SCAN    Narrative    [facility] Procedure Note      Limited Bedside ED Ultrasound of Thorax:    PROCEDURE: PERFORMED BY: Dr. Teja Monet MD  INDICATIONS/SYMPTOM:  shortness of breath and dyspnea  PROBE: High frequency linear probe  BODY LOCATION: Chest  FINDINGS:  Images of both lung hemithoracies taken in 2D in multiple rib spaces        Right side:  Lung sliding artifact  Present     Comet tail artifacts  Absent   Left side:  Lung sliding artifact  Present     Comet tail artifacts  Absent       INTERPRETATION: The exam was normal. There was no free fluid identified in the chest cavity. No evidence of pneumothorax.  IMAGE DOCUMENTATION: Unable to save ultrasound images as the ultrasound would not connect to the network.         US Lower Extremity Venous Duplex Bilateral    Narrative    US LOWER EXTREMITY VENOUS DUPLEX BILATERAL 4/18/2024 9:49 AM    CLINICAL HISTORY: Lower extremity swelling and pain (especially left  leg). Recent cellulitis, but also has history of PE. Please evaluate  for VTE of lower extremities causing increased pain and swelling.    TECHNIQUE: Venous Duplex ultrasound of bilateral lower extremities  with and without compression, augmentation and duplex. Color flow  and  spectral Doppler with waveform analysis performed.    COMPARISON: None.    FINDINGS: Exam includes the common femoral, femoral, popliteal veins  as well as segmentally visualized deep calf veins and greater  saphenous vein.     RIGHT: No deep vein thrombosis. No superficial thrombophlebitis. No  popliteal cyst.    LEFT: No deep vein thrombosis, as seen. Left calf veins are obscured  by edema. No superficial thrombophlebitis. No popliteal cyst. Distal  lower extremity subcutaneous edema.      Impression    IMPRESSION:  No deep venous thrombosis in the bilateral lower extremities, as seen.    JAIRO MAK MD         SYSTEM ID:  [id number]       Discharge Medications   Current Discharge Medication List        START taking these medications    Details   cefdinir (OMNICEF) 300 MG capsule Take 1 capsule (300 mg) by mouth 2 times daily for 7 days  Qty: 14 capsule, Refills: 0    Associated Diagnoses: Left leg cellulitis           CONTINUE these medications which have NOT CHANGED    Details   acetaminophen (TYLENOL) 650 MG CR tablet Take 650 mg by mouth every 8 hours as needed for mild pain or fever      aspirin (ASPIRIN LOW DOSE) 81 MG EC tablet Take 1 tablet (81 mg) by mouth daily    Associated Diagnoses: Chronic heart failure with preserved ejection fraction (H)      bumetanide (BUMEX) 2 MG tablet Take 2 tablets (4 mg) by mouth 2 times daily  Qty: 120 tablet, Refills: 2    Associated Diagnoses: Chronic heart failure with preserved ejection fraction (H)      Emollient (GOLD BOND MEDICATED BODY EX) Externally apply 1 Application topically 2 times daily as needed      metoprolol succinate ER (TOPROL XL) 25 MG 24 hr tablet Take 1 tablet (25 mg) by mouth daily  Qty: 90 tablet, Refills: 3    Associated Diagnoses: Benign essential hypertension; Sinus tachycardia      miconazole (MICATIN) 2 % AERP powder Apply topically 2 times daily as needed  Qty: 128 g, Refills: 3    Associated Diagnoses: Intertrigo      potassium  chloride sheila ER (KLOR-CON M20) 20 MEQ CR tablet Take 1 tablet (20 mEq) by mouth 2 times daily  Qty: 180 tablet, Refills: 3    Associated Diagnoses: Chronic heart failure with preserved ejection fraction (H)      sertraline (ZOLOFT) 100 MG tablet Take 1 tablet (100 mg) by mouth daily  Qty: 90 tablet, Refills: 3    Associated Diagnoses: Moderate episode of recurrent major depressive disorder (H); Generalized anxiety disorder      tirzepatide-Weight Management (ZEPBOUND) 2.5 MG/0.5ML prefilled pen Inject 0.5 mLs (2.5 mg) Subcutaneous every 7 days  Qty: 2 mL, Refills: 0    Associated Diagnoses: Class 3 severe obesity due to excess calories with serious comorbidity and body mass index (BMI) of 50.0 to 59.9 in adult (H)           STOP taking these medications       Furosemide (FUROSCIX) 80 MG/10ML CTKT Comments:   Reason for Stopping:             Allergies   Allergies   Allergen Reactions    Nkda [No Known Drug Allergy]

## 2024-04-23 NOTE — PLAN OF CARE
End Of Shift Note    Situation: 47 yo rather obese female patient admitted 4/18 with pulmonary hypertension and sepsis, which has improved.    Plan: receiving Rocephin for sepsis per protocol, Bumex for diuresing fluids.      Subjective/Objective:    Neuro: Alert & oriented X 4, because of Bumex dosing was not out of bed this shift, wore pure-wick for strict I & O.  Per PT evaluation was up to chair and back to bed without issues with walker as she does at home.      Cardiac: In SR overnight rate in the 60's to mid 70's.  SBP's in high 90's and 100's.  Continues with Peripheral edema in lower extremities +3 and +3 generalized edema also.      Resp: Was on CPAP overnight with 4 LPM oxygen bled in to maintain saturations in the low to mid 90's.  Home oxygen regimen of 4 LPM NC when not on CPAP during day.  Lung sounds diminished but clear.  No SOB noted overnight.      GI/: rounded soft abdomen with rolls of skin folds, purewick in place overnight.  Good output yellow clear urine.       Endo: unremarkable.      MSK: Bed rested entire night , minimal weight shifting with skin checks. At present slept with CPAP in recliner position as she does at home.       Skin: Lymphedema dressing to left lower leg toes up to knee bend. Barrier cream applied to lower extremities at HS .  Inner dry wicking fabric to moist abdominial skin folds and under breasts overnight.  Also behind left knee.      LDAs: one PIV to right FA infusing TKO.    Goal Outcome Evaluation:      Plan of Care Reviewed With: patient    Overall Patient Progress: improvingOverall Patient Progress: improving    Outcome Evaluation: Output of yellow clear urine total of 2675 this 12 hour shift after 2000 dose of Bumex obtained in  pure-wick external catheter. new cath placed at HS and working well overnight.  Only slightly damp when checking skin condition every 2 hours.  slept very soundly with Home CPAP on through the night.  Weight down only 0.7 kg since 24  hours ago.  .

## 2024-04-23 NOTE — PLAN OF CARE
Occupational Therapy Discharge Summary    Reason for therapy discharge:    Discharged to home with home therapy.    Progress towards therapy goal(s). See goals on Care Plan in [facility] electronic health record for goal details.  Goals met    Therapy recommendation(s):    Home OT to maximize independence with ADLs and related mobility.

## 2024-04-23 NOTE — PROGRESS NOTES
Chart reviewed. Noted lymphedema therapy has been initiated and pt will discharge home today with home care including PT/OT for lymphedema therapy.     Spoke with patient's nurse who reports abd fold is much improved with minimal open areas and knee much improved as well with minimal drainage.    Updated discharge wound care orders for home care. [facility] nurse will not see pt again prior to discharge.     Sirena Wilcox RN, CWOCN

## 2024-04-24 ENCOUNTER — TELEPHONE (OUTPATIENT)
Dept: FAMILY MEDICINE | Facility: CLINIC | Age: 49
End: 2024-04-24
Payer: MEDICARE

## 2024-04-24 ENCOUNTER — PATIENT OUTREACH (OUTPATIENT)
Dept: CARE COORDINATION | Facility: CLINIC | Age: 49
End: 2024-04-24
Payer: MEDICARE

## 2024-04-24 ENCOUNTER — MEDICAL CORRESPONDENCE (OUTPATIENT)
Dept: HEALTH INFORMATION MANAGEMENT | Facility: CLINIC | Age: 49
End: 2024-04-24
Payer: MEDICARE

## 2024-04-24 LAB
BACTERIA BLD CULT: NO GROWTH
BACTERIA BLD CULT: NO GROWTH

## 2024-04-24 ASSESSMENT — ACTIVITIES OF DAILY LIVING (ADL): DEPENDENT_IADLS:: INDEPENDENT;TRANSPORTATION

## 2024-04-24 NOTE — PROGRESS NOTES
Clinic Care Coordination Contact  [facility]/Voicemail    [facility]  Hospitalist Discharge Summary       Date of Admission:  4/18/2024  Date of Discharge:  4/23/2024  3:55 PM  Discharging Provider: Agapito Urias MD           Discharge Diagnoses  Sepsis   Left lower extremity cellulitis   Thrombocytopenia  Hypokalemia  Chronic heart failure with preserved ejection fraction   Pulmonary hypertension, severe  Chronic respiratory failure with hypoxia   Concern for hepatic cirrhosis  Elevated d-dimer  Obesity hypoventilation syndrome on CPAP  Morbid obesity  Erythrocytosis  Hypertension  Major depressive disorder   Generalized anxiety disorder  History of pulmonary embolism  Multiple sclerosis           Clinical Data: Care Coordinator Outreach    Outreach Documentation Number of Outreach Attempt   4/24/2024   9:45 AM 1       Left message on patient's voicemail with call back information and requested return call.    Plan:. Care Coordinator will try to reach patient again in 1-2 business days.    [facility]   Meenakshi Campbell RN, Care Coordinator   [facility]'s   E-mail [email or web address]   [phone]

## 2024-04-24 NOTE — TELEPHONE ENCOUNTER
Home Care is calling regarding an established patient with [facility].       Requesting orders from: Mikala Luna  Provider is following patient: Yes  Is this a 60-day recertification request?  Yes    Orders Requested    Skilled Nursing, recently discharged form hospital  Request for recertification   Frequency:  1x/wk for 8 wks for wound care and cellulitis management    Physical Therapy  Verbal orders given to Eval and treat    Verbal orders given.  Home Care will send orders for provider to sign.  Confirmed ok to leave a detailed message with call back.  Contact information confirmed and updated as needed.    Marce Story RN

## 2024-04-25 ASSESSMENT — ACTIVITIES OF DAILY LIVING (ADL): DEPENDENT_IADLS:: INDEPENDENT;TRANSPORTATION

## 2024-04-25 NOTE — PROGRESS NOTES
Clinic Care Coordination Contact  [facility]/Voicemail    Clinical Data: Care Coordinator Outreach    Outreach Documentation Number of Outreach Attempt   4/24/2024   9:45 AM 1   4/25/2024   1:11 PM 2       Left message on patient's voicemail with call back information and requested return call. Left a message with a reminder of hospital follow up with PCP 5/1 and to call with questions concerns and hopefully she has received a call from home care     Plan: . Care Coordinator will do no further outreaches at this time.    [facility]   Meenakshi Campbell RN, Care Coordinator   [facility]'s   E-mail [email or web address]   [phone]

## 2024-04-26 ENCOUNTER — APPOINTMENT (OUTPATIENT)
Dept: CT IMAGING | Facility: CLINIC | Age: 49
DRG: 602 | End: 2024-04-26
Attending: FAMILY MEDICINE
Payer: MEDICARE

## 2024-04-26 ENCOUNTER — NURSE TRIAGE (OUTPATIENT)
Dept: FAMILY MEDICINE | Facility: CLINIC | Age: 49
End: 2024-04-26
Payer: MEDICARE

## 2024-04-26 ENCOUNTER — MEDICAL CORRESPONDENCE (OUTPATIENT)
Dept: HEALTH INFORMATION MANAGEMENT | Facility: CLINIC | Age: 49
End: 2024-04-26

## 2024-04-26 ENCOUNTER — HOSPITAL ENCOUNTER (INPATIENT)
Facility: CLINIC | Age: 49
LOS: 3 days | Discharge: HOME HEALTH | DRG: 602 | End: 2024-05-01
Attending: FAMILY MEDICINE | Admitting: STUDENT IN AN ORGANIZED HEALTH CARE EDUCATION/TRAINING PROGRAM
Payer: MEDICARE

## 2024-04-26 DIAGNOSIS — G35 MULTIPLE SCLEROSIS (H): Chronic | ICD-10-CM

## 2024-04-26 DIAGNOSIS — J96.21 ACUTE AND CHRONIC RESPIRATORY FAILURE WITH HYPOXIA (H): ICD-10-CM

## 2024-04-26 DIAGNOSIS — I50.9 ACUTE ON CHRONIC CONGESTIVE HEART FAILURE, UNSPECIFIED HEART FAILURE TYPE (H): ICD-10-CM

## 2024-04-26 DIAGNOSIS — E66.9 OBESITY, UNSPECIFIED CLASSIFICATION, UNSPECIFIED OBESITY TYPE, UNSPECIFIED WHETHER SERIOUS COMORBIDITY PRESENT: Primary | ICD-10-CM

## 2024-04-26 DIAGNOSIS — R53.1 WEAKNESS: ICD-10-CM

## 2024-04-26 DIAGNOSIS — T36.95XA ANTIBIOTIC-ASSOCIATED DIARRHEA: ICD-10-CM

## 2024-04-26 DIAGNOSIS — K52.1 ANTIBIOTIC-ASSOCIATED DIARRHEA: ICD-10-CM

## 2024-04-26 DIAGNOSIS — A04.72 COLITIS DUE TO CLOSTRIDIUM DIFFICILE: ICD-10-CM

## 2024-04-26 DIAGNOSIS — L03.311 CELLULITIS OF ABDOMINAL WALL: ICD-10-CM

## 2024-04-26 DIAGNOSIS — I27.20 PULMONARY HYPERTENSION (H): ICD-10-CM

## 2024-04-26 PROBLEM — N39.0 RECURRENT UTI: Status: ACTIVE | Noted: 2022-05-03

## 2024-04-26 PROBLEM — A41.9 SEPSIS (H): Status: RESOLVED | Noted: 2018-10-23 | Resolved: 2024-04-26

## 2024-04-26 LAB
ALBUMIN SERPL BCG-MCNC: 3.6 G/DL (ref 3.5–5.2)
ALP SERPL-CCNC: 73 U/L (ref 40–150)
ALT SERPL W P-5'-P-CCNC: 15 U/L (ref 0–50)
ANION GAP SERPL CALCULATED.3IONS-SCNC: 9 MMOL/L (ref 7–15)
AST SERPL W P-5'-P-CCNC: 34 U/L (ref 0–45)
BASE EXCESS BLDV CALC-SCNC: 4 MMOL/L (ref -3–3)
BASOPHILS # BLD AUTO: 0 10E3/UL (ref 0–0.2)
BASOPHILS NFR BLD AUTO: 1 %
BILIRUB SERPL-MCNC: 2.4 MG/DL
BUN SERPL-MCNC: 18.5 MG/DL (ref 6–20)
C DIFF GDH STL QL IA: POSITIVE
C DIFF TOX A+B STL QL IA: NEGATIVE
C DIFF TOX B STL QL: POSITIVE
CALCIUM SERPL-MCNC: 10.1 MG/DL (ref 8.6–10)
CHLORIDE SERPL-SCNC: 100 MMOL/L (ref 98–107)
CREAT SERPL-MCNC: 0.7 MG/DL (ref 0.51–0.95)
CRP SERPL-MCNC: 16.42 MG/L
DEPRECATED HCO3 PLAS-SCNC: 28 MMOL/L (ref 22–29)
EGFRCR SERPLBLD CKD-EPI 2021: >90 ML/MIN/1.73M2
EOSINOPHIL # BLD AUTO: 0.1 10E3/UL (ref 0–0.7)
EOSINOPHIL NFR BLD AUTO: 2 %
ERYTHROCYTE [DISTWIDTH] IN BLOOD BY AUTOMATED COUNT: 17.2 % (ref 10–15)
GLUCOSE SERPL-MCNC: 84 MG/DL (ref 70–99)
HCO3 BLDV-SCNC: 29 MMOL/L (ref 21–28)
HCT VFR BLD AUTO: 48.8 % (ref 35–47)
HGB BLD-MCNC: 15.7 G/DL (ref 11.7–15.7)
HOLD SPECIMEN: NORMAL
IMM GRANULOCYTES # BLD: 0 10E3/UL
IMM GRANULOCYTES NFR BLD: 0 %
LACTATE SERPL-SCNC: 2 MMOL/L (ref 0.7–2)
LYMPHOCYTES # BLD AUTO: 0.3 10E3/UL (ref 0.8–5.3)
LYMPHOCYTES NFR BLD AUTO: 7 %
MCH RBC QN AUTO: 27.7 PG (ref 26.5–33)
MCHC RBC AUTO-ENTMCNC: 32.2 G/DL (ref 31.5–36.5)
MCV RBC AUTO: 86 FL (ref 78–100)
MONOCYTES # BLD AUTO: 0.3 10E3/UL (ref 0–1.3)
MONOCYTES NFR BLD AUTO: 7 %
NEUTROPHILS # BLD AUTO: 3.9 10E3/UL (ref 1.6–8.3)
NEUTROPHILS NFR BLD AUTO: 83 %
NRBC # BLD AUTO: 0 10E3/UL
NRBC BLD AUTO-RTO: 0 /100
O2/TOTAL GAS SETTING VFR VENT: ABNORMAL %
OXYHGB MFR BLDV: 36 % (ref 70–75)
PCO2 BLDV: 42 MM HG (ref 40–50)
PH BLDV: 7.44 [PH] (ref 7.32–7.43)
PLATELET # BLD AUTO: 184 10E3/UL (ref 150–450)
PO2 BLDV: 22 MM HG (ref 25–47)
POTASSIUM SERPL-SCNC: 4.3 MMOL/L (ref 3.4–5.3)
PROT SERPL-MCNC: 8.3 G/DL (ref 6.4–8.3)
RBC # BLD AUTO: 5.66 10E6/UL (ref 3.8–5.2)
SAO2 % BLDV: 36.8 % (ref 70–75)
SODIUM SERPL-SCNC: 137 MMOL/L (ref 135–145)
WBC # BLD AUTO: 4.6 10E3/UL (ref 4–11)

## 2024-04-26 PROCEDURE — 99222 1ST HOSP IP/OBS MODERATE 55: CPT | Mod: AI

## 2024-04-26 PROCEDURE — 87040 BLOOD CULTURE FOR BACTERIA: CPT

## 2024-04-26 PROCEDURE — 258N000003 HC RX IP 258 OP 636: Performed by: FAMILY MEDICINE

## 2024-04-26 PROCEDURE — 36415 COLL VENOUS BLD VENIPUNCTURE: CPT | Performed by: FAMILY MEDICINE

## 2024-04-26 PROCEDURE — 96375 TX/PRO/DX INJ NEW DRUG ADDON: CPT

## 2024-04-26 PROCEDURE — 250N000013 HC RX MED GY IP 250 OP 250 PS 637

## 2024-04-26 PROCEDURE — 82805 BLOOD GASES W/O2 SATURATION: CPT | Performed by: FAMILY MEDICINE

## 2024-04-26 PROCEDURE — 74177 CT ABD & PELVIS W/CONTRAST: CPT | Mod: MA

## 2024-04-26 PROCEDURE — 96361 HYDRATE IV INFUSION ADD-ON: CPT

## 2024-04-26 PROCEDURE — 250N000009 HC RX 250: Performed by: FAMILY MEDICINE

## 2024-04-26 PROCEDURE — 250N000011 HC RX IP 250 OP 636: Performed by: FAMILY MEDICINE

## 2024-04-26 PROCEDURE — 250N000011 HC RX IP 250 OP 636

## 2024-04-26 PROCEDURE — 84155 ASSAY OF PROTEIN SERUM: CPT | Performed by: FAMILY MEDICINE

## 2024-04-26 PROCEDURE — 99285 EMERGENCY DEPT VISIT HI MDM: CPT | Performed by: FAMILY MEDICINE

## 2024-04-26 PROCEDURE — 84450 TRANSFERASE (AST) (SGOT): CPT | Performed by: FAMILY MEDICINE

## 2024-04-26 PROCEDURE — G0378 HOSPITAL OBSERVATION PER HR: HCPCS

## 2024-04-26 PROCEDURE — 87493 C DIFF AMPLIFIED PROBE: CPT | Performed by: FAMILY MEDICINE

## 2024-04-26 PROCEDURE — 36415 COLL VENOUS BLD VENIPUNCTURE: CPT

## 2024-04-26 PROCEDURE — 96374 THER/PROPH/DIAG INJ IV PUSH: CPT | Mod: 59 | Performed by: FAMILY MEDICINE

## 2024-04-26 PROCEDURE — 86140 C-REACTIVE PROTEIN: CPT | Performed by: FAMILY MEDICINE

## 2024-04-26 PROCEDURE — 87324 CLOSTRIDIUM AG IA: CPT | Performed by: FAMILY MEDICINE

## 2024-04-26 PROCEDURE — 96361 HYDRATE IV INFUSION ADD-ON: CPT | Performed by: FAMILY MEDICINE

## 2024-04-26 PROCEDURE — 85004 AUTOMATED DIFF WBC COUNT: CPT | Performed by: FAMILY MEDICINE

## 2024-04-26 PROCEDURE — 83605 ASSAY OF LACTIC ACID: CPT | Performed by: FAMILY MEDICINE

## 2024-04-26 PROCEDURE — 99285 EMERGENCY DEPT VISIT HI MDM: CPT | Mod: 25 | Performed by: FAMILY MEDICINE

## 2024-04-26 RX ORDER — NALOXONE HYDROCHLORIDE 0.4 MG/ML
0.2 INJECTION, SOLUTION INTRAMUSCULAR; INTRAVENOUS; SUBCUTANEOUS
Status: DISCONTINUED | OUTPATIENT
Start: 2024-04-26 | End: 2024-05-01 | Stop reason: HOSPADM

## 2024-04-26 RX ORDER — CALCIUM CARBONATE 500 MG/1
1000 TABLET, CHEWABLE ORAL 4 TIMES DAILY PRN
Status: DISCONTINUED | OUTPATIENT
Start: 2024-04-26 | End: 2024-05-01 | Stop reason: HOSPADM

## 2024-04-26 RX ORDER — BUMETANIDE 1 MG/1
4 TABLET ORAL 2 TIMES DAILY
Status: DISCONTINUED | OUTPATIENT
Start: 2024-04-26 | End: 2024-04-28

## 2024-04-26 RX ORDER — ONDANSETRON 2 MG/ML
4 INJECTION INTRAMUSCULAR; INTRAVENOUS EVERY 6 HOURS PRN
Status: DISCONTINUED | OUTPATIENT
Start: 2024-04-26 | End: 2024-04-26

## 2024-04-26 RX ORDER — ONDANSETRON 4 MG/1
4 TABLET, ORALLY DISINTEGRATING ORAL EVERY 6 HOURS PRN
Status: DISCONTINUED | OUTPATIENT
Start: 2024-04-26 | End: 2024-04-26

## 2024-04-26 RX ORDER — DEXTROSE MONOHYDRATE, SODIUM CHLORIDE, AND POTASSIUM CHLORIDE 50; 1.49; 4.5 G/1000ML; G/1000ML; G/1000ML
INJECTION, SOLUTION INTRAVENOUS CONTINUOUS
Status: DISCONTINUED | OUTPATIENT
Start: 2024-04-26 | End: 2024-04-26

## 2024-04-26 RX ORDER — ONDANSETRON 2 MG/ML
4 INJECTION INTRAMUSCULAR; INTRAVENOUS EVERY 6 HOURS PRN
Status: DISCONTINUED | OUTPATIENT
Start: 2024-04-26 | End: 2024-05-01 | Stop reason: HOSPADM

## 2024-04-26 RX ORDER — ACETAMINOPHEN 325 MG/1
650 TABLET ORAL EVERY 4 HOURS PRN
Status: DISCONTINUED | OUTPATIENT
Start: 2024-04-26 | End: 2024-05-01 | Stop reason: HOSPADM

## 2024-04-26 RX ORDER — VANCOMYCIN HYDROCHLORIDE 125 MG/1
125 CAPSULE ORAL 4 TIMES DAILY
Status: DISCONTINUED | OUTPATIENT
Start: 2024-04-26 | End: 2024-04-30

## 2024-04-26 RX ORDER — SERTRALINE HYDROCHLORIDE 100 MG/1
100 TABLET, FILM COATED ORAL DAILY
Status: DISCONTINUED | OUTPATIENT
Start: 2024-04-27 | End: 2024-05-01 | Stop reason: HOSPADM

## 2024-04-26 RX ORDER — METOPROLOL SUCCINATE 25 MG/1
25 TABLET, EXTENDED RELEASE ORAL DAILY
Status: DISCONTINUED | OUTPATIENT
Start: 2024-04-27 | End: 2024-05-01 | Stop reason: HOSPADM

## 2024-04-26 RX ORDER — ASPIRIN 81 MG/1
81 TABLET ORAL DAILY
Status: DISCONTINUED | OUTPATIENT
Start: 2024-04-27 | End: 2024-05-01 | Stop reason: HOSPADM

## 2024-04-26 RX ORDER — DIPHENOXYLATE HCL/ATROPINE 2.5-.025MG
1 TABLET ORAL 4 TIMES DAILY PRN
Status: DISCONTINUED | OUTPATIENT
Start: 2024-04-26 | End: 2024-05-01 | Stop reason: HOSPADM

## 2024-04-26 RX ORDER — AMOXICILLIN 250 MG
1 CAPSULE ORAL 2 TIMES DAILY PRN
Status: DISCONTINUED | OUTPATIENT
Start: 2024-04-26 | End: 2024-05-01 | Stop reason: HOSPADM

## 2024-04-26 RX ORDER — NALOXONE HYDROCHLORIDE 0.4 MG/ML
0.4 INJECTION, SOLUTION INTRAMUSCULAR; INTRAVENOUS; SUBCUTANEOUS
Status: DISCONTINUED | OUTPATIENT
Start: 2024-04-26 | End: 2024-05-01 | Stop reason: HOSPADM

## 2024-04-26 RX ORDER — HYDROMORPHONE HYDROCHLORIDE 1 MG/ML
.3-.5 INJECTION, SOLUTION INTRAMUSCULAR; INTRAVENOUS; SUBCUTANEOUS
Status: DISCONTINUED | OUTPATIENT
Start: 2024-04-26 | End: 2024-05-01 | Stop reason: HOSPADM

## 2024-04-26 RX ORDER — ONDANSETRON 4 MG/1
4 TABLET, ORALLY DISINTEGRATING ORAL EVERY 6 HOURS PRN
Status: DISCONTINUED | OUTPATIENT
Start: 2024-04-26 | End: 2024-05-01 | Stop reason: HOSPADM

## 2024-04-26 RX ORDER — IOPAMIDOL 755 MG/ML
96 INJECTION, SOLUTION INTRAVASCULAR ONCE
Status: COMPLETED | OUTPATIENT
Start: 2024-04-26 | End: 2024-04-26

## 2024-04-26 RX ORDER — POTASSIUM CHLORIDE 1500 MG/1
20 TABLET, EXTENDED RELEASE ORAL 2 TIMES DAILY
Status: DISCONTINUED | OUTPATIENT
Start: 2024-04-26 | End: 2024-05-01 | Stop reason: HOSPADM

## 2024-04-26 RX ORDER — ONDANSETRON 2 MG/ML
4 INJECTION INTRAMUSCULAR; INTRAVENOUS ONCE
Status: COMPLETED | OUTPATIENT
Start: 2024-04-26 | End: 2024-04-26

## 2024-04-26 RX ORDER — AMOXICILLIN 250 MG
2 CAPSULE ORAL 2 TIMES DAILY PRN
Status: DISCONTINUED | OUTPATIENT
Start: 2024-04-26 | End: 2024-05-01 | Stop reason: HOSPADM

## 2024-04-26 RX ORDER — CEFTRIAXONE 2 G/1
2 INJECTION, POWDER, FOR SOLUTION INTRAMUSCULAR; INTRAVENOUS EVERY 24 HOURS
Status: DISCONTINUED | OUTPATIENT
Start: 2024-04-26 | End: 2024-05-01

## 2024-04-26 RX ADMIN — SODIUM CHLORIDE 100 ML: 9 INJECTION, SOLUTION INTRAVENOUS at 15:16

## 2024-04-26 RX ADMIN — CEFTRIAXONE SODIUM 2 G: 2 INJECTION, POWDER, FOR SOLUTION INTRAMUSCULAR; INTRAVENOUS at 23:47

## 2024-04-26 RX ADMIN — POTASSIUM CHLORIDE, DEXTROSE MONOHYDRATE AND SODIUM CHLORIDE: 150; 5; 450 INJECTION, SOLUTION INTRAVENOUS at 20:38

## 2024-04-26 RX ADMIN — IOPAMIDOL 96 ML: 755 INJECTION, SOLUTION INTRAVENOUS at 15:15

## 2024-04-26 RX ADMIN — SODIUM CHLORIDE 1000 ML: 9 INJECTION, SOLUTION INTRAVENOUS at 13:38

## 2024-04-26 RX ADMIN — ONDANSETRON 4 MG: 2 INJECTION INTRAMUSCULAR; INTRAVENOUS at 13:59

## 2024-04-26 RX ADMIN — BUMETANIDE 2 MG: 1 TABLET ORAL at 21:47

## 2024-04-26 RX ADMIN — POTASSIUM CHLORIDE 20 MEQ: 1500 TABLET, EXTENDED RELEASE ORAL at 21:46

## 2024-04-26 ASSESSMENT — ACTIVITIES OF DAILY LIVING (ADL)
ADLS_ACUITY_SCORE: 38
ADLS_ACUITY_SCORE: 33
ADLS_ACUITY_SCORE: 38
ADLS_ACUITY_SCORE: 31
ADLS_ACUITY_SCORE: 33

## 2024-04-26 NOTE — H&P
"[facility]    History and Physical  Hospital Medicine       Date of Admission:  4/26/2024  Date of Service: 4/26/2024     Assessment & Plan   Bess Enamorado is a 48 year old female with a past medical hx of recurrent cellulitis, MS, hx of NSTEMI, hx of PE, HFpEF, recurrent UTIs, pulmonary HTN, anxiety, depression, NARCISA, HTN who presents on 4/26/2024 with profuse watery diarrhea.     C. Diff infection  New cellulitis of abdominal wall  Recurrent cellulitis    Presents with acute onset profuse watery diarrhea 4/26 am. C. Diff positive. Significant recent abx use. Admitted 3/16-3/20 for LLE cellulitis due to staph lugdunensis. ID was consulted and she was initially tx with IV Meropenem, IV Vanc, IV Cefazolin, and then transitioned to IV Vanc. She was re-admitted 4/18-4/23 for LLE cellulitis and initially tx with IV Meropenem. Blood cultures on 4/18 grew Klebsiella oxytoca, strep gordonni, staph epi, so she was switched to IV Ceftriaxone. She was discharged on PO Cefdinir.     On admission she was found to have a small area of cellulitis on her lower abdomen, which she states is new, however CT stated \"similar panus thickening and subcutaneous edema\".      Afebrile without leukocytosis. No sepsis at this time.     - PO vanc for C. diff  - Ceftriaxone 2g IV daily for new cellulitis of abdominal wall and hx of uncontrolled infections  - Blood cultures pending  - Consult ID in am    Chronic heart failure with preserved ejection fraction (H)  Pulmonary hypertension, severe    Follows with Dr. Diaz,  cardiology. PTA Bumetanide increased to 4mg BID (from 3mg daily). Subsequent plan was to add Metolazone or subcutaneous Furosemide.     On presentation appears euvolemic.     Managed prior to admission with Bumetanide 4mg BID, Metoprolol 25, potassium 20 BID, continue.    Chronic respiratory failure with hypoxia without hypercapnia  On recent discharge she was sent home with 4L home oxygen " "continuously. Has since decreased to 3L. Arrived with mild SOB, but SpO2 stable on 3L NC.     Concern for cirrhosis  Noted incidentally on recent admission. LFTs WNL on admission, but Tbili 2.4 (baseline 1.5-5).  - CMP in am    Obesity hypoventilation syndrome (H)  Morbid obesity (H)  BMP >50. Hasn't started Zepbound yet.   - Continue home CPAP    History of pulmonary embolism  History of right upper lobe PE discovered Feb 2022 in setting of acute covid infection, treated with xarelto which was stopped after 3 months treatment by pulmonology. Not currently on anticoagulation.     Hx of NSTEMI  Managed prior to admission with Aspirin 81, continue.    Multiple sclerosis (H)  Immobile. Doesn't follow with neuro. Symptoms stable. Not on meds PTA.    Moderate episode of recurrent major depressive disorder (H)  Generalized anxiety disorder  Managed prior to admission with Sertraline 100, continue.      Clinically Significant Risk Factors Present on Admission                # Drug Induced Platelet Defect: home medication list includes an antiplatelet medication   # Hypertension: Noted on problem list  # Chronic heart failure with preserved ejection fraction: heart failure noted on problem list and last echo with EF >50%     # Severe Obesity: Estimated body mass index is 51.62 kg/m  as calculated from the following:    Height as of this encounter: 1.626 m (5' 4\").    Weight as of this encounter: 136.4 kg (300 lb 11.3 oz).         # Financial/Environmental Concerns:            Diet: Combination Diet 2 gm NA Diet    DVT Prophylaxis: Aspirin  Brar Catheter: Not present  Code Status: Full Code  Lines: peripheral iv    Disposition Plan      Expected Discharge Date: 04/27/2024             Entered: Russel Gonzalez PA-C 04/26/2024, 10:51 PM     Status: Patient is appropriate for inpatient admission for IV abx and PO vanc. She has a hx of recurrent cellulitis and uncontrolled infections, and may need escalation of Abx.   Russel " JODEE Gonzalez        The patient's care was discussed with the Attending Physician, Dr. Carrasquillo .    Primary Care Physician   Mikala Luna [phone]    History is obtained from the patient and review of old records via the EMR.    History of Present Illness   Bess Enamorado is a 48 year old female with a past medical hx of recurrent cellulitis, MS, hx of NSTEMI, hx of PE, HFpEF, recurrent UTIs, pulmonary HTN, anxiety, depression, NARCISA, HTN who presents on 4/26/2024 with profuse watery diarrhea.     Recently admitted 4/18 - 4/23 for left lower extremity cellulitis and sepsis.  She was treated with vancomycin and Zosyn.  She was discharged on p.o. cefdinir.  Recently admitted 3/16 - 3/20 for left lower extremity cellulitis treated with IV meropenem, vancomycin, cefazolin.    She presents for profuse diarrhea that started this morning.  She also had increased shortness of breath and found to be mildly hypotensive.  Denies fevers, chills, diaphoresis.  Had 2 episodes of emesis at home.  No melena, hematemesis, coffee-ground appearance of emesis.    Review of Systems   The 5 point Review of Systems is negative other than noted in the HPI or here.     Past Medical History    Past Medical History:   Diagnosis Date    Acute on chronic diastolic congestive heart failure (H) 02/09/2022    Arthritis 2019    Hips    ASCUS favor benign 08/2015    Neg HPV    Depressive disorder 2010    H/O colposcopy with cervical biopsy 09/14/2015    Bx & ECC - negative    Hypertension 2019    LSIL on Pap smear 07/2014    Neg high risk HPV    Multiple sclerosis (H) 08/29/2005 9/18/200pt dx with MS 2004--dx by neurolpau and is followed by Dr. Harris    Myocardial infarction (H)     Obese     Pneumonia due to infectious organism, unspecified laterality, unspecified part of lung 02/08/2022    Sepsis (Temp 101, , WBC 13.0) 10/23/2018    Shingles 10/2016    SIRS (systemic inflammatory response syndrome) (H) 03/04/2021    Sleep apnea      UNSPEC CONSTIPATION 09/18/2006 9/18/2006   Has tried otc suppository and otc lax.      Patient Active Problem List    Diagnosis Date Noted    Cellulitis of abdominal wall 04/26/2024     Priority: Medium    Weakness 04/26/2024     Priority: Medium    Antibiotic-associated diarrhea 04/26/2024     Priority: Medium    Pulmonary hypertension (H) 04/18/2024     Priority: Medium    Acute and chronic respiratory failure with hypoxia (H) 04/18/2024     Priority: Medium    Sepsis due to cellulitis (H) 04/18/2024     Priority: Medium    Morbid obesity (H) 03/16/2024     Priority: Medium    Lower GI bleed 03/16/2024     Priority: Medium    Prolonged Q-T interval on ECG 03/16/2024     Priority: Medium    Left leg cellulitis 03/16/2024     Priority: Medium    Acute on chronic respiratory failure with hypoxemia (H) 03/16/2024     Priority: Medium    Acute on chronic congestive heart failure, unspecified heart failure type (H) 03/16/2024     Priority: Medium    Sepsis, due to unspecified organism, unspecified whether acute organ dysfunction present (H) 03/16/2024     Priority: Medium    Obesity hypoventilation syndrome (H) 12/29/2023     Priority: Medium    Acute on chronic respiratory failure with hypoxia and hypercapnia (H) 12/26/2023     Priority: Medium    Pyelonephritis, acute 12/21/2023     Priority: Medium    Cellulitis of lower extremity, unspecified laterality 12/21/2023     Priority: Medium    Hypokalemia 05/04/2022     Priority: Medium    Recurrent UTI's 05/03/2022     Priority: Medium    Splenomegaly 04/06/2022     Priority: Medium    Hyponatremia 04/06/2022     Priority: Medium    Renal insufficiency 04/06/2022     Priority: Medium    Chronic heart failure with preserved ejection fraction (H) 03/09/2022     Priority: Medium    Ureteral stone 02/27/2022     Priority: Medium    Urinary tract infection 02/27/2022     Priority: Medium    Hydronephrosis with urinary obstruction due to ureteral calculus 02/27/2022      Priority: Medium    History of pulmonary embolism 02/24/2022     Priority: Medium    Dehydration 02/21/2022     Priority: Medium    Hyperkalemia 02/21/2022     Priority: Medium    Acute renal failure, unspecified acute renal failure type (H24) 02/21/2022     Priority: Medium    Hypotension due to hypovolemia 02/21/2022     Priority: Medium    Acute on chronic diastolic congestive heart failure (H) 02/09/2022     Priority: Medium    NSTEMI (non-ST elevated myocardial infarction) (H) 02/09/2022     Priority: Medium    Acute pulmonary embolism without acute cor pulmonale, unspecified pulmonary embolism type (H) 02/08/2022     Priority: Medium    Tachycardia 03/04/2021     Priority: Medium    Cellulitis of right leg 03/04/2021     Priority: Medium    Lymphedema of both lower extremities 06/15/2020     Priority: Medium    Generalized anxiety disorder 03/23/2020     Priority: Medium    History of abnormal cervical Pap smear 08/26/2019     Priority: Medium    Moderate episode of recurrent major depressive disorder (H) 03/23/2018     Priority: Medium    NARCISA on CPAP 08/22/2017     Priority: Medium     Severe NARCISA with sleep-associated hypoxemia, with likely REM-related hypoventilation  Polysomnography - Test date 8/9/2017  AHI 45.9, basline SpO2 92.9%, mike SpO2 54.9%, time of SpO2 <= 88% of 31.7 minutes.  Severe desaturations and sustained hypoxemia confined to singular supine REM period (no lateral REM observed for comparison).  Also seen to have TCM increase by ~15 mmHg over baseline in supine REM, consistent with hypoventilation.  Pre-study VBG was WNL.  CPAP titrated to 10 cm H2O and appeared optimal, including supine REM, with normalization of SpO2 and TCM normalized to low-40's.  Seen to have frequent PLM's (64.3 / hour on diagnostic, 89.6 / hour on treatment, ~20% associated with cortical arousals).        Benign essential hypertension 02/15/2016     Priority: Medium    Peroneal tendon rupture 11/23/2015      Priority: Medium    Class 3 severe obesity due to excess calories with serious comorbidity and body mass index (BMI) of 50.0 to 59.9 in adult (H) 08/24/2015     Priority: Medium     Oct 2016:  Starting [employer] program in [place], goal to lose 140 lbs over next 2 years      Papanicolaou smear of cervix with low grade squamous intraepithelial lesion (LGSIL) 08/03/2014     Priority: Medium     7/29/2014:Pap--LSIL. Neg high risk HPV. Plan cotest in 1 year (if this is ASCUS or LSIL then colp). In reminders  8/20/15: Pap - ASCUS, Neg HPV. Plan colp  9/14/15: [place] Bx & ECC - negative. Plan cotest in 1 year.   10/20/16: NIL Pap, Neg HPV. Plan cotest in 3 years.       Eating disorder 08/25/2013     Priority: Medium     Binge-eating disorder; social phobia  See psychological assessment from [facility], Dr. Anupama Bowie, 8/14/2013  Problem list name updated by automated process. Provider to review      Leg edema 04/19/2013     Priority: Medium    Anxiety 06/28/2011     Priority: Medium     Followed by neurologist      Restless legs 06/28/2011     Priority: Medium    CARDIOVASCULAR SCREENING; LDL GOAL LESS THAN 160 10/31/2010     Priority: Medium    Constipation 09/18/2006     Priority: Medium     9/18/2006    Has tried otc suppository and otc lax.   Problem list name updated by automated process. Provider to review      Multiple sclerosis (H) 08/29/2005     Priority: Medium     9/18/200pt dx with MS 2004--dx by neurolgist and is followed by Dr. Jeet Nicholson, also MS nurse Heather Thomas      Polycystic ovaries 08/29/2005     Priority: Medium     Diagnosed in [place], had facial hair, overweight, carb craving, records being requested, never on metformin          Past Surgical History   Past Surgical History:   Procedure Laterality Date    CHOLECYSTECTOMY, LAPOROSCOPIC      Cholecystectomy, Laparoscopic    COLONOSCOPY  2007?    Bathroom issue..results normal    COMBINED CYSTOSCOPY, RETROGRADES, EXCHANGE STENT  URETER(S) Right 2/21/2022    Procedure: CYSTOSCOPY, WITH right RETROGRADE PYELOGRAM AND right URETERAL STENT PLACEMENT;  Surgeon: Doyle Palomares MD;  Location: [facility] [place] [facility]    OPEN REDUCTION INTERNAL FIXATION TOE(S)  4/13/2012    Procedure:OPEN REDUCTION INTERNAL FIXATION TOE(S); Open reduction internal fixation right proximal fifth metatarsal fracture-   Anes-choice block; Surgeon:LEY, JEFFREY DUANE; Location:[facility] OR    PICC SINGLE LUMEN PLACEMENT  3/20/2024    PICC TRIPLE LUMEN PLACEMENT  2/21/2022             Prior to Admission Medications   Prior to Admission Medications   Prescriptions Last Dose Informant Patient Reported? Taking?   Emollient (GOLD BOND MEDICATED BODY EX) Unknown at prn Self Yes Yes   Sig: Externally apply 1 Application topically 2 times daily as needed   acetaminophen (TYLENOL) 650 MG CR tablet Unknown at prn Self Yes Yes   Sig: Take 650 mg by mouth every 8 hours as needed for mild pain or fever   aspirin (ASPIRIN LOW DOSE) 81 MG EC tablet 4/25/2024 Self No Yes   Sig: Take 1 tablet (81 mg) by mouth daily   bumetanide (BUMEX) 2 MG tablet 4/25/2024 at pm Self No Yes   Sig: Take 2 tablets (4 mg) by mouth 2 times daily   cefdinir (OMNICEF) 300 MG capsule 4/25/2024 at pm Self No Yes   Sig: Take 1 capsule (300 mg) by mouth 2 times daily for 7 days   metoprolol succinate ER (TOPROL XL) 25 MG 24 hr tablet 4/25/2024 at am Self No Yes   Sig: Take 1 tablet (25 mg) by mouth daily   miconazole (MICATIN) 2 % AERP powder Unknown at prn Self No Yes   Sig: Apply topically 2 times daily as needed   potassium chloride sheila ER (KLOR-CON M20) 20 MEQ CR tablet 4/25/2024 at pm Self No Yes   Sig: Take 1 tablet (20 mEq) by mouth 2 times daily   sertraline (ZOLOFT) 100 MG tablet 4/25/2024 at am Self No Yes   Sig: Take 1 tablet (100 mg) by mouth daily   tirzepatide-Weight Management (ZEPBOUND) 2.5 MG/0.5ML prefilled pen Unknown at not started Self No Yes   Sig: Inject 0.5 mLs (2.5 mg) Subcutaneous every 7 days       Facility-Administered Medications: None     Allergies   Allergies   Allergen Reactions    Nkda [No Known Drug Allergy]        Family History    Family History   Problem Relation Age of Onset    Neurologic Disorder Father         MS    Heart Disease Father         irregular heart rate    Diabetes Father     Melanoma Father     Sleep Apnea Father     Other Cancer Father     Cancer Maternal Grandfather         lung    Other Cancer Maternal Grandfather     Cancer Paternal Grandmother         stomach    Other Cancer Paternal Grandmother     Cancer Mother     Other Cancer Mother     Thyroid Disease Mother     Gynecology Maternal Aunt         PCOS    Hypertension Maternal Grandmother     Anxiety Disorder Maternal Grandmother     Thyroid Disease Maternal Grandmother     Anxiety Disorder Sister      Social History   Social History     Socioeconomic History    Marital status: Single     Spouse name: Not on file    Number of children: Not on file    Years of education: Not on file    Highest education level: Not on file   Occupational History     Employer: [employer]   Tobacco Use    Smoking status: Never    Smokeless tobacco: Never   Vaping Use    Vaping status: Never Used   Substance and Sexual Activity    Alcohol use: Yes     Comment: social    Drug use: No    Sexual activity: Not Currently     Partners: Male     Birth control/protection: Pill   Other Topics Concern    Parent/sibling w/ CABG, MI or angioplasty before 65F 55M? No   Social History Narrative    , 3rd-5th grade     Social Determinants of Health     Financial Resource Strain: Low Risk  (9/25/2023)    Financial Resource Strain     Within the past 12 months, have you or your family members you live with been unable to get utilities (heat, electricity) when it was really needed?: No   Food Insecurity: Low Risk  (3/21/2024)    Food Insecurity     Within the past 12 months, did you worry that your food would run out before you got  "money to buy more?: No     Within the past 12 months, did the food you bought just not last and you didn t have money to get more?: No   Transportation Needs: Low Risk  (3/21/2024)    Transportation Needs     Within the past 12 months, has lack of transportation kept you from medical appointments, getting your medicines, non-medical meetings or appointments, work, or from getting things that you need?: No   Physical Activity: Inactive (3/21/2024)    Exercise Vital Sign     Days of Exercise per Week: 0 days     Minutes of Exercise per Session: 0 min   Stress: No Stress Concern Present (12/4/2020)    [employer] [place] [employer] - Occupational Stress Questionnaire     Feeling of Stress : Only a little   Social Connections: Unknown (3/1/2022)    Social Connection and Isolation Panel [NHANES]     Frequency of Communication with Friends and Family: Twice a week     Frequency of Social Gatherings with Friends and Family: Twice a week     Attends [employer] Services: Not on file     Active Member of Clubs or Organizations: Not on file     Attends Club or Organization Meetings: Not on file     Marital Status: Not on file   Interpersonal Safety: Low Risk  (9/27/2023)    Interpersonal Safety     Do you feel physically and emotionally safe where you currently live?: Yes     Within the past 12 months, have you been hit, slapped, kicked or otherwise physically hurt by someone?: No     Within the past 12 months, have you been humiliated or emotionally abused in other ways by your partner or ex-partner?: No   Housing Stability: Low Risk  (3/21/2024)    Housing Stability     Do you have housing? : Yes     Are you worried about losing your housing?: No     Physical Exam   /62 (BP Location: Left arm)   Pulse 93   Temp 99.3  F (37.4  C) (Oral)   Resp 14   Ht 1.626 m (5' 4\")   Wt 136.4 kg (300 lb 11.3 oz)   LMP  (LMP Unknown)   SpO2 96%   BMI 51.62 kg/m       Weight: 300 lbs 11.32 oz Body mass index is 51.62 " kg/m .     Constitutional: Alert, oriented, cooperative, in no acute distress, appears nontoxic.  HENT: Oropharynx is clear and moist. No evidence of cranial trauma. Normocephalic. Eyes anicteric.   Cardiovascular: Regular rate and rhythm, normal S1 and S2, and no murmur noted. No lower extremity edema.  Respiratory: Clear to auscultation bilaterally with no adventitious breath sounds.   GI: Soft, non-tender, normal bowel sounds, no hepatomegaly, no masses.  Musculoskeletal: Normal muscle bulk and tone. FROM in all extremities   Skin: Small area of tender cellulitis on lower abdomen roughly 3cm. Prior LLE cellulitis is no longer tender, but is still red.   Neurologic: Cranial nerves 2-12 are grossly intact.       Data   Data reviewed today:   Recent Labs   Lab 04/26/24  1319 04/22/24  1708 04/22/24  0717 04/20/24  1141   WBC 4.6  --  4.2 5.7   HGB 15.7  --  14.0 14.0   MCV 86  --  88 88     --  157 158   INR  --   --  1.35*  --      --  137  --    POTASSIUM 4.3 3.6 3.1*  --    CHLORIDE 100  --  100  --    CO2 28  --  29  --    BUN 18.5  --  9.2  --    CR 0.70  --  0.62  --    ANIONGAP 9  --  8  --    EVITA 10.1*  --  9.2  --    GLC 84  --  89  --    ALBUMIN 3.6  --  2.9*  --    PROTTOTAL 8.3  --  7.0  --    BILITOTAL 2.4*  --  1.5*  --    ALKPHOS 73  --  62  --    ALT 15  --  12  --    AST 34  --  17  --        Recent Results (from the past 24 hour(s))   CT Chest (PE) Abdomen Pelvis w Contrast    Narrative    CT CHEST PE ABDOMEN AND PELVIS WITH CONTRAST 4/26/2024 3:35 PM    CLINICAL HISTORY: Short of air, recent hospitalization for cellulitis  with sepsis, history of PE, diarrhea abdominal discomfort.    TECHNIQUE: CT scan of the chest, abdomen, and pelvis was performed  following injection of IV contrast. Multiplanar reformats were  obtained. Dose reduction techniques were used.     CONTRAST: 100mL Isovue-370    COMPARISON: CT PE 4/18/2024. CT abdomen and pelvis 3/16/2024     FINDINGS:   ANGIOGRAM CHEST:  Similar dilated main pulmonary arteries. No  convincing acute pulmonary embolus. Similar dilated right ventricle.  Thoracic aorta is nonaneurysmal.     LOWER NECK: Unremarkable.    LUNGS AND PLEURA: No focal consolidation or pleural effusion. Similar  mosaic attenuation of the lung parenchyma. Calcified granulomas.    AIRWAYS: Patent.    HEART: No pericardial effusion. No coronary calcifications. Mitral  valve annular calcification.    MEDIASTINUM AND DIPTI: Similar mildly enlarged upper mediastinal (6/5),  precarinal and left axillary lymph nodes measuring up to 1.3 cm  (6/23).    HEPATOBILIARY: Similar heterogeneous enhancement and lobular contour  of the liver. No suspicious mass. Cholecystectomy.    SPLEEN: Unremarkable.    PANCREAS: Unremarkable.    ADRENAL GLANDS: Unremarkable.    KIDNEYS/URETERS/BLADDER: Nonobstructing right renal calculus.    BOWEL: No bowel dilatation or wall thickening.    LYMPH NODES AND PERITONEUM: Similar scattered prominent  retroperitoneal and pelvic lymph nodes.     VASCULATURE: Patent recannulized paraumbilical vein.    PELVIS: Similar in size mobile right adnexal dermoid measuring 9.2 cm.  No new surrounding inflammation.    MUSCULOSKELETAL: Similar lower abdominal wall thickening and  subcutaneous edema. No fluid collection. No aggressive osseous lesion.  Degenerative changes of the spine. Remote left rib fractures.    ADDITIONAL FINDINGS: None.      Impression    IMPRESSION:  1.  No convincing acute pulmonary embolus or process within the chest.  2.  Similar pannus skin thickening and subcutaneous edema. Correlate  for cellulitis.  3.  Similar dilated main pulmonary artery and right ventricle  suggestive of pulmonary arterial hypertension.  4.  Morphologic features of cirrhosis, possibly cardiac related.  5.  Similar mildly enlarged mediastinal and left axillary lymph nodes,  possibly reactive. Attention on follow-up.  6.  Similar right adnexal mobile dermoid.    MAARTEN L  MD JULIET         SYSTEM ID:  [id number]       I personally reviewed the chest CT image(s) showing similar subcutaneous edema at the pannus.  Pulmonary hypertension

## 2024-04-26 NOTE — TELEPHONE ENCOUNTER
Pt was prescribed a antibiotic from the hospital (Cefdinir). This was started on the 24th. She has been having diarrhea and vomiting since 5am this morning. Pt has had more than 10-15 episodes of diarrhea. The consistency is like water. Has abdomanal discomfort when she needs to have a BM.  Pt has vomited 3 times. NO fever, dizziness, or other symptoms.     Please advise, thanks    Reason for Disposition   SEVERE diarrhea (e.g., 7 or more times / day more than normal)    Additional Information   Negative: Shock suspected (e.g., cold/pale/clammy skin, too weak to stand, low BP, rapid pulse)   Negative: Difficult to awaken or acting confused (e.g., disoriented, slurred speech)   Negative: Sounds like a life-threatening emergency to the triager   Negative: Vomiting also present and worse than the diarrhea   Negative: [1] Blood in stool AND [2] without diarrhea   Negative: Diarrhea in a cancer patient who is currently (or recently) receiving chemotherapy or radiation therapy, or cancer patient who has metastatic or end-stage cancer and is receiving palliative care   Negative: SEVERE abdominal pain (e.g., excruciating)   Negative: [1] Blood in the stool AND [2] moderate or large amount of blood (e.g., any blood clots, passing blood without stool, toilet water turns red)   Negative: Black or tarry bowel movements  (Exception: Chronic-unchanged black-grey BMs AND is taking iron pills or Pepto-Bismol.)   Negative: [1] Drinking very little AND [2] dehydration suspected (e.g., no urine > 12 hours, very dry mouth, very lightheaded)   Negative: Patient sounds very sick or weak to the triager    Protocols used: Diarrhea on Antibiotics-MIS Gross RN  [facility]

## 2024-04-26 NOTE — ED TRIAGE NOTES
Discharged from ICU Tuesday, started on cefter and now having diarrhea since 5am today. Supposed to be on 4L at home, only on 3L, hypotensive systolic 80s-101s entoure. On 10L non rebreather upon arrival

## 2024-04-26 NOTE — ED PROVIDER NOTES
History     Chief Complaint   Patient presents with    Shortness of Breath    Medication Reaction     HPI  Bess Enamorado is a 48 year old female, past medical history is significant for pulmonary hypertension, chronic respiratory failure with hypoxia, morbid obesity, recent admission for sepsis due to left lower extremity cellulitis/18/24 and recent discharge 3 days prior to presentation by EMS now with concerns of hypotension, diarrhea.  Sent home on cefdinir .patient has been discharged to home on 4 L O2 nasal cannula and is only currently on 3 L.  Additional past medical history includes morbid obesity, lower GI bleed, prolonged QT interval, CHF, obesity hypoventilation syndrome, urolithiasis, NSTEMI, PE, hypertension, eating disorder, anxiety, leg edema, restless leg syndrome, MS, constipation, presents now by EMS with concerns of profuse diarrhea beginning at 4:00 this morning mild abdominal discomfort and increased shortness of breath despite home oxygen.  I reviewed the patient's recent hospitalization at our facility here from 4/18 through 4/23.  The patient been started on cefdinir and has been compliant with therapy.  No other new medications that she is aware of.  She denies fever chills or sweats.  She states that she has been on oxygen after hospital admission previously and usually is able to titrate down fairly quickly she was down to 3 L of O2 today when she began more short of breath in the context of the diarrhea.  Nausea and 2 episodes of emesis this morning that was nonblack or coffee-ground in appearance.  Denies chest pain or shortness of breath.      Allergies:  Allergies   Allergen Reactions    Nkda [No Known Drug Allergy]        Problem List:    Patient Active Problem List    Diagnosis Date Noted    Pulmonary hypertension (H) 04/18/2024     Priority: Medium    Acute and chronic respiratory failure with hypoxia (H) 04/18/2024     Priority: Medium    Sepsis due to cellulitis (H) 04/18/2024      Priority: Medium    Morbid obesity (H) 03/16/2024     Priority: Medium    Lower GI bleed 03/16/2024     Priority: Medium    Prolonged Q-T interval on ECG 03/16/2024     Priority: Medium    Left leg cellulitis 03/16/2024     Priority: Medium    Acute on chronic respiratory failure with hypoxemia (H) 03/16/2024     Priority: Medium    Acute on chronic congestive heart failure, unspecified heart failure type (H) 03/16/2024     Priority: Medium    Sepsis, due to unspecified organism, unspecified whether acute organ dysfunction present (H) 03/16/2024     Priority: Medium    Obesity hypoventilation syndrome (H) 12/29/2023     Priority: Medium    Acute on chronic respiratory failure with hypoxia and hypercapnia (H) 12/26/2023     Priority: Medium    Pyelonephritis, acute 12/21/2023     Priority: Medium    Cellulitis of lower extremity, unspecified laterality 12/21/2023     Priority: Medium    Hypokalemia 05/04/2022     Priority: Medium    Recurrent UTI's 05/03/2022     Priority: Medium    Splenomegaly 04/06/2022     Priority: Medium    Hyponatremia 04/06/2022     Priority: Medium    Renal insufficiency 04/06/2022     Priority: Medium    Chronic heart failure with preserved ejection fraction (H) 03/09/2022     Priority: Medium    Ureteral stone 02/27/2022     Priority: Medium    Urinary tract infection 02/27/2022     Priority: Medium    Hydronephrosis with urinary obstruction due to ureteral calculus 02/27/2022     Priority: Medium    History of pulmonary embolism 02/24/2022     Priority: Medium    Dehydration 02/21/2022     Priority: Medium    Hyperkalemia 02/21/2022     Priority: Medium    Acute renal failure, unspecified acute renal failure type (H24) 02/21/2022     Priority: Medium    Hypotension due to hypovolemia 02/21/2022     Priority: Medium    Acute on chronic diastolic congestive heart failure (H) 02/09/2022     Priority: Medium    NSTEMI (non-ST elevated myocardial infarction) (H) 02/09/2022     Priority:  Medium    Acute pulmonary embolism without acute cor pulmonale, unspecified pulmonary embolism type (H) 02/08/2022     Priority: Medium    Tachycardia 03/04/2021     Priority: Medium    Cellulitis of right leg 03/04/2021     Priority: Medium    Lymphedema of both lower extremities 06/15/2020     Priority: Medium    Generalized anxiety disorder 03/23/2020     Priority: Medium    History of abnormal cervical Pap smear 08/26/2019     Priority: Medium    Sepsis (Temp 101, , WBC 13.0) 10/23/2018     Priority: Medium    Moderate episode of recurrent major depressive disorder (H) 03/23/2018     Priority: Medium    NARCISA on CPAP 08/22/2017     Priority: Medium     Severe NARCISA with sleep-associated hypoxemia, with likely REM-related hypoventilation  Polysomnography - Test date 8/9/2017  AHI 45.9, basline SpO2 92.9%, mike SpO2 54.9%, time of SpO2 <= 88% of 31.7 minutes.  Severe desaturations and sustained hypoxemia confined to singular supine REM period (no lateral REM observed for comparison).  Also seen to have TCM increase by ~15 mmHg over baseline in supine REM, consistent with hypoventilation.  Pre-study VBG was WNL.  CPAP titrated to 10 cm H2O and appeared optimal, including supine REM, with normalization of SpO2 and TCM normalized to low-40's.  Seen to have frequent PLM's (64.3 / hour on diagnostic, 89.6 / hour on treatment, ~20% associated with cortical arousals).        Benign essential hypertension 02/15/2016     Priority: Medium    Peroneal tendon rupture 11/23/2015     Priority: Medium    Class 3 severe obesity due to excess calories with serious comorbidity and body mass index (BMI) of 50.0 to 59.9 in adult (H) 08/24/2015     Priority: Medium     Oct 2016:  Starting [employer] program in [place], goal to lose 140 lbs over next 2 years      Papanicolaou smear of cervix with low grade squamous intraepithelial lesion (LGSIL) 08/03/2014     Priority: Medium     7/29/2014:Pap--LSIL. Neg high risk HPV. Plan cotest  in 1 year (if this is ASCUS or LSIL then colp). In reminders  8/20/15: Pap - ASCUS, Neg HPV. Plan colp  9/14/15: [place] Bx & ECC - negative. Plan cotest in 1 year.   10/20/16: NIL Pap, Neg HPV. Plan cotest in 3 years.       Eating disorder 08/25/2013     Priority: Medium     Binge-eating disorder; social phobia  See psychological assessment from [facility], Dr. Anupama Bowie, 8/14/2013  Problem list name updated by automated process. Provider to review      Leg edema 04/19/2013     Priority: Medium    Anxiety 06/28/2011     Priority: Medium     Followed by neurologist      Restless legs 06/28/2011     Priority: Medium    CARDIOVASCULAR SCREENING; LDL GOAL LESS THAN 160 10/31/2010     Priority: Medium    Constipation 09/18/2006     Priority: Medium     9/18/2006    Has tried otc suppository and otc lax.   Problem list name updated by automated process. Provider to review      Multiple sclerosis (H) 08/29/2005     Priority: Medium     9/18/200pt dx with MS 2004--dx by neurolgist and is followed by Dr. Jeet Nicholson, also MS nurse Heather Thomas      Polycystic ovaries 08/29/2005     Priority: Medium     Diagnosed in [place], had facial hair, overweight, carb craving, records being requested, never on metformin          Past Medical History:    Past Medical History:   Diagnosis Date    Acute on chronic diastolic congestive heart failure (H) 02/09/2022    Arthritis 2019    ASCUS favor benign 08/2015    Depressive disorder 2010    H/O colposcopy with cervical biopsy 09/14/2015    Hypertension 2019    LSIL on Pap smear 07/2014    Multiple sclerosis (H) 08/29/2005    Myocardial infarction (H)     Obese     Pneumonia due to infectious organism, unspecified laterality, unspecified part of lung 02/08/2022    Shingles 10/2016    SIRS (systemic inflammatory response syndrome) (H) 03/04/2021    Sleep apnea     UNSPEC CONSTIPATION 09/18/2006       Past Surgical History:    Past Surgical History:   Procedure Laterality  Date    CHOLECYSTECTOMY, LAPOROSCOPIC      Cholecystectomy, Laparoscopic    COLONOSCOPY  2007?    Bathroom issue..results normal    COMBINED CYSTOSCOPY, RETROGRADES, EXCHANGE STENT URETER(S) Right 2/21/2022    Procedure: CYSTOSCOPY, WITH right RETROGRADE PYELOGRAM AND right URETERAL STENT PLACEMENT;  Surgeon: Doyle Palomares MD;  Location: [facility] OR    OPEN REDUCTION INTERNAL FIXATION TOE(S)  4/13/2012    Procedure:OPEN REDUCTION INTERNAL FIXATION TOE(S); Open reduction internal fixation right proximal fifth metatarsal fracture-   Anes-choice block; Surgeon:LEY, JEFFREY DUANE; Location:[facility] OR    PICC SINGLE LUMEN PLACEMENT  3/20/2024    PICC TRIPLE LUMEN PLACEMENT  2/21/2022            Family History:    Family History   Problem Relation Age of Onset    Neurologic Disorder Father         MS    Heart Disease Father         irregular heart rate    Diabetes Father     Melanoma Father     Sleep Apnea Father     Other Cancer Father     Cancer Maternal Grandfather         lung    Other Cancer Maternal Grandfather     Cancer Paternal Grandmother         stomach    Other Cancer Paternal Grandmother     Cancer Mother     Other Cancer Mother     Thyroid Disease Mother     Gynecology Maternal Aunt         PCOS    Hypertension Maternal Grandmother     Anxiety Disorder Maternal Grandmother     Thyroid Disease Maternal Grandmother     Anxiety Disorder Sister        Social History:  Marital Status:  Single [1]  Social History     Tobacco Use    Smoking status: Never    Smokeless tobacco: Never   Vaping Use    Vaping status: Never Used   Substance Use Topics    Alcohol use: Yes     Comment: social    Drug use: No        Medications:    acetaminophen (TYLENOL) 650 MG CR tablet  aspirin (ASPIRIN LOW DOSE) 81 MG EC tablet  bumetanide (BUMEX) 2 MG tablet  cefdinir (OMNICEF) 300 MG capsule  Emollient (GOLD BOND MEDICATED BODY EX)  metoprolol succinate ER (TOPROL XL) 25 MG 24 hr tablet  miconazole (MICATIN) 2 % AERP  powder  potassium chloride sheila ER (KLOR-CON M20) 20 MEQ CR tablet  sertraline (ZOLOFT) 100 MG tablet  tirzepatide-Weight Management (ZEPBOUND) 2.5 MG/0.5ML prefilled pen          Review of Systems   All other systems reviewed and are negative.      Physical Exam   BP: 117/79  Pulse: 87  Resp: 13  SpO2: 95 %      Physical Exam  Vitals and nursing note reviewed.   Constitutional:       General: She is not in acute distress.     Appearance: She is well-developed. She is obese. She is ill-appearing.   HENT:      Head: Normocephalic and atraumatic.      Mouth/Throat:      Mouth: Mucous membranes are moist.      Pharynx: Oropharynx is clear.   Eyes:      Extraocular Movements: Extraocular movements intact.      Pupils: Pupils are equal, round, and reactive to light.   Cardiovascular:      Rate and Rhythm: Normal rate and regular rhythm.   Pulmonary:      Effort: Pulmonary effort is normal.      Breath sounds: Normal breath sounds.   Abdominal:      Comments: Soft, bowel sounds present, tender suprapubic area and left lower quadrant voluntary guarding.  No rebound no referred pain.  There is a faintly cellulitic area mid abdominal area involving the patient's pannus which is tender and indurated but nonfluctuant.  She tells me this is new since hospital discharge.     Musculoskeletal:         General: Normal range of motion.      Cervical back: Normal range of motion and neck supple.   Skin:     General: Skin is warm and dry.      Capillary Refill: Capillary refill takes less than 2 seconds.   Neurological:      General: No focal deficit present.      Mental Status: She is alert.   Psychiatric:         Mood and Affect: Mood normal.         Behavior: Behavior normal.         ED Course        Procedures                Results for orders placed or performed during the hospital encounter of 04/26/24 (from the past 24 hour(s))   [facility] Draw    Narrative    The following orders were created for panel order [facility]  Draw.  Procedure                               Abnormality         Status                     ---------                               -----------         ------                     Extra Green Top (Lithium...[[id number]]                      Final result               Extra Purple Top Tube[[id number]]                            Final result               Extra Heparinized Syringe[[id number]]                        Final result                 Please view results for these tests on the individual orders.   Extra Green Top (Lithium Heparin) Tube   Result Value Ref Range    Hold Specimen jic    Extra Purple Top Tube   Result Value Ref Range    Hold Specimen JIC    Extra Heparinized Syringe   Result Value Ref Range    Hold Specimen     CBC with platelets, differential    Narrative    The following orders were created for panel order CBC with platelets, differential.  Procedure                               Abnormality         Status                     ---------                               -----------         ------                     CBC with platelets and d...[[id number]]  Abnormal            Final result                 Please view results for these tests on the individual orders.   Comprehensive metabolic panel   Result Value Ref Range    Sodium 137 135 - 145 mmol/L    Potassium 4.3 3.4 - 5.3 mmol/L    Carbon Dioxide (CO2) 28 22 - 29 mmol/L    Anion Gap 9 7 - 15 mmol/L    Urea Nitrogen 18.5 6.0 - 20.0 mg/dL    Creatinine 0.70 0.51 - 0.95 mg/dL    GFR Estimate >90 >60 mL/min/1.73m2    Calcium 10.1 (H) 8.6 - 10.0 mg/dL    Chloride 100 98 - 107 mmol/L    Glucose 84 70 - 99 mg/dL    Alkaline Phosphatase 73 40 - 150 U/L    AST 34 0 - 45 U/L    ALT 15 0 - 50 U/L    Protein Total 8.3 6.4 - 8.3 g/dL    Albumin 3.6 3.5 - 5.2 g/dL    Bilirubin Total 2.4 (H) <=1.2 mg/dL   Lactic acid whole blood   Result Value Ref Range    Lactic Acid 2.0 0.7 - 2.0 mmol/L   CRP Inflammation   Result Value Ref Range    CRP Inflammation 16.42 (H)  <5.00 mg/L   CBC with platelets and differential   Result Value Ref Range    WBC Count 4.6 4.0 - 11.0 10e3/uL    RBC Count 5.66 (H) 3.80 - 5.20 10e6/uL    Hemoglobin 15.7 11.7 - 15.7 g/dL    Hematocrit 48.8 (H) 35.0 - 47.0 %    MCV 86 78 - 100 fL    MCH 27.7 26.5 - 33.0 pg    MCHC 32.2 31.5 - 36.5 g/dL    RDW 17.2 (H) 10.0 - 15.0 %    Platelet Count 184 150 - 450 10e3/uL    % Neutrophils 83 %    % Lymphocytes 7 %    % Monocytes 7 %    % Eosinophils 2 %    % Basophils 1 %    % Immature Granulocytes 0 %    NRBCs per 100 WBC 0 <1 /100    Absolute Neutrophils 3.9 1.6 - 8.3 10e3/uL    Absolute Lymphocytes 0.3 (L) 0.8 - 5.3 10e3/uL    Absolute Monocytes 0.3 0.0 - 1.3 10e3/uL    Absolute Eosinophils 0.1 0.0 - 0.7 10e3/uL    Absolute Basophils 0.0 0.0 - 0.2 10e3/uL    Absolute Immature Granulocytes 0.0 <=0.4 10e3/uL    Absolute NRBCs 0.0 10e3/uL   Blood gas venous   Result Value Ref Range    pH Venous 7.44 (H) 7.32 - 7.43    pCO2 Venous 42 40 - 50 mm Hg    pO2 Venous 22 (L) 25 - 47 mm Hg    Bicarbonate Venous 29 (H) 21 - 28 mmol/L    Base Excess/Deficit Venous 4.0 (H) -3.0 - 3.0 mmol/L    FIO2      Oxyhemoglobin Venous 36 (L) 70 - 75 %    O2 Sat, Venous 36.8 (L) 70.0 - 75.0 %    Narrative    In healthy individuals, oxyhemoglobin (O2Hb) and oxygen saturation (SO2) are approximately equal. In the presence of dyshemoglobins, oxyhemoglobin can be considerably lower than oxygen saturation.   CT Chest (PE) Abdomen Pelvis w Contrast    Narrative    CT CHEST PE ABDOMEN AND PELVIS WITH CONTRAST 4/26/2024 3:35 PM    CLINICAL HISTORY: Short of air, recent hospitalization for cellulitis  with sepsis, history of PE, diarrhea abdominal discomfort.    TECHNIQUE: CT scan of the chest, abdomen, and pelvis was performed  following injection of IV contrast. Multiplanar reformats were  obtained. Dose reduction techniques were used.     CONTRAST: 100mL Isovue-370    COMPARISON: CT PE 4/18/2024. CT abdomen and pelvis 3/16/2024     FINDINGS:    ANGIOGRAM CHEST: Similar dilated main pulmonary arteries. No  convincing acute pulmonary embolus. Similar dilated right ventricle.  Thoracic aorta is nonaneurysmal.     LOWER NECK: Unremarkable.    LUNGS AND PLEURA: No focal consolidation or pleural effusion. Similar  mosaic attenuation of the lung parenchyma. Calcified granulomas.    AIRWAYS: Patent.    HEART: No pericardial effusion. No coronary calcifications. Mitral  valve annular calcification.    MEDIASTINUM AND DIPTI: Similar mildly enlarged upper mediastinal (6/5),  precarinal and left axillary lymph nodes measuring up to 1.3 cm  (6/23).    HEPATOBILIARY: Similar heterogeneous enhancement and lobular contour  of the liver. No suspicious mass. Cholecystectomy.    SPLEEN: Unremarkable.    PANCREAS: Unremarkable.    ADRENAL GLANDS: Unremarkable.    KIDNEYS/URETERS/BLADDER: Nonobstructing right renal calculus.    BOWEL: No bowel dilatation or wall thickening.    LYMPH NODES AND PERITONEUM: Similar scattered prominent  retroperitoneal and pelvic lymph nodes.     VASCULATURE: Patent recannulized paraumbilical vein.    PELVIS: Similar in size mobile right adnexal dermoid measuring 9.2 cm.  No new surrounding inflammation.    MUSCULOSKELETAL: Similar lower abdominal wall thickening and  subcutaneous edema. No fluid collection. No aggressive osseous lesion.  Degenerative changes of the spine. Remote left rib fractures.    ADDITIONAL FINDINGS: None.      Impression    IMPRESSION:  1.  No convincing acute pulmonary embolus or process within the chest.  2.  Similar pannus skin thickening and subcutaneous edema. Correlate  for cellulitis.  3.  Similar dilated main pulmonary artery and right ventricle  suggestive of pulmonary arterial hypertension.  4.  Morphologic features of cirrhosis, possibly cardiac related.  5.  Similar mildly enlarged mediastinal and left axillary lymph nodes,  possibly reactive. Attention on follow-up.  6.  Similar right adnexal mobile  dermoid.    BRODY CHUNG MD         SYSTEM ID:  [id number]     5:57 PM  Reviewed patient with the on-call hospitalist Russel Main, given the patient's recent hospitalization as well as extensive review of her EHR with respect to deterioration in context of acute infections I think it is reasonable to admit her to observation status here.  I discussed with the hospitalist the consideration for antibiotics given the appearance of the patient's abdomen in addition to her improving left lower extremity cellulitis as well as the diarrhea of short duration but significant intensity which I think in and of itself likely precludes the patient from going home in her current state.  Her oxygen requirement is declining which is excellent.  Transition orders were placed by myself in the emergency department after discussion with hospitalist.    Medications   sodium chloride 0.9% BOLUS 1,000 mL (0 mLs Intravenous Stopped 4/26/24 1440)   ondansetron (ZOFRAN) injection 4 mg (4 mg Intravenous $Given 4/26/24 1359)   iopamidol (ISOVUE-370) solution 96 mL (96 mLs Intravenous $Given 4/26/24 1515)   sodium chloride 0.9 % bag 500mL for CT scan flush use (100 mLs Intravenous $Given 4/26/24 1516)       Assessments & Plan (with Medical Decision Making)   Assessment and plan with medical decision making at the time stamp above.      Disclaimer: This note consists of symbols derived from keyboarding, dictation and/or voice recognition software. As a result, there may be errors in the script that have gone undetected. Please consider this when interpreting information found in this chart.      I have reviewed the nursing notes.    I have reviewed the findings, diagnosis, plan and need for follow up with the patient.      New Prescriptions    No medications on file       Final diagnoses:   Antibiotic-associated diarrhea   Weakness   Cellulitis of abdominal wall - Pannus       4/26/2024   [facility] EMERGENCY DEPT        Caleb Angeles MD  04/26/24 1501

## 2024-04-26 NOTE — PROGRESS NOTES
No return call from the patient.  Patient has a PCP hospital follow up appointment 5/1/2024    [facility]   Meenakshi Campbell RN, Care Coordinator   [facility]'s   E-mail [email or web address]   [phone]

## 2024-04-26 NOTE — TELEPHONE ENCOUNTER
RN called patient and she reports she has no transportation to get any place. She has no one to help her. RN asked her a few times and patient confirmed she has no way to get anywhere for care.    Patient uses [facility] in [place] pharmacy.    Please below triage and please advise.    Isabella Salinas RN on 4/26/2024 at 11:02 AM

## 2024-04-26 NOTE — TELEPHONE ENCOUNTER
Level of care recommendation:  See HCP Within 4 Hours (Or PCP Triage)  SEVERE diarrhea (e.g., 7 or more times / day more than normal)  Reason: Higher risk of dehydration, may need to stop or change antibiotic, needs evaluation.    Isabella Salinas RN on 4/26/2024 at 10:57 AM

## 2024-04-26 NOTE — MEDICATION SCRIBE - ADMISSION MEDICATION HISTORY
Medication Scribe Admission Medication History    Admission medication history is complete. The information provided in this note is only as accurate as the sources available at the time of the update.    Information Source(s): Patient via in-person    Pertinent Information: Patient did not take any PTA meds today    Changes made to PTA medication list:  Added: None  Deleted: None  Changed: None    Allergies reviewed with patient and updates made in EHR: yes    Medication History Completed By: Loan England 4/26/2024 6:42 PM    PTA Med List   Medication Sig Last Dose    acetaminophen (TYLENOL) 650 MG CR tablet Take 650 mg by mouth every 8 hours as needed for mild pain or fever Unknown at prn    aspirin (ASPIRIN LOW DOSE) 81 MG EC tablet Take 1 tablet (81 mg) by mouth daily 4/25/2024    bumetanide (BUMEX) 2 MG tablet Take 2 tablets (4 mg) by mouth 2 times daily 4/25/2024 at pm    cefdinir (OMNICEF) 300 MG capsule Take 1 capsule (300 mg) by mouth 2 times daily for 7 days 4/25/2024 at pm    Emollient (GOLD BOND MEDICATED BODY EX) Externally apply 1 Application topically 2 times daily as needed Unknown at prn    metoprolol succinate ER (TOPROL XL) 25 MG 24 hr tablet Take 1 tablet (25 mg) by mouth daily 4/25/2024 at am    miconazole (MICATIN) 2 % AERP powder Apply topically 2 times daily as needed Unknown at prn    potassium chloride sheila ER (KLOR-CON M20) 20 MEQ CR tablet Take 1 tablet (20 mEq) by mouth 2 times daily 4/25/2024 at pm    sertraline (ZOLOFT) 100 MG tablet Take 1 tablet (100 mg) by mouth daily 4/25/2024 at am    tirzepatide-Weight Management (ZEPBOUND) 2.5 MG/0.5ML prefilled pen Inject 0.5 mLs (2.5 mg) Subcutaneous every 7 days Unknown at not started

## 2024-04-27 LAB
ALBUMIN SERPL BCG-MCNC: 3.2 G/DL (ref 3.5–5.2)
ALP SERPL-CCNC: 63 U/L (ref 40–150)
ALT SERPL W P-5'-P-CCNC: 13 U/L (ref 0–50)
ANION GAP SERPL CALCULATED.3IONS-SCNC: 10 MMOL/L (ref 7–15)
AST SERPL W P-5'-P-CCNC: 32 U/L (ref 0–45)
BILIRUB SERPL-MCNC: 1.4 MG/DL
BUN SERPL-MCNC: 11.9 MG/DL (ref 6–20)
CALCIUM SERPL-MCNC: 9.3 MG/DL (ref 8.6–10)
CHLORIDE SERPL-SCNC: 103 MMOL/L (ref 98–107)
CREAT SERPL-MCNC: 0.7 MG/DL (ref 0.51–0.95)
DEPRECATED HCO3 PLAS-SCNC: 25 MMOL/L (ref 22–29)
EGFRCR SERPLBLD CKD-EPI 2021: >90 ML/MIN/1.73M2
ERYTHROCYTE [DISTWIDTH] IN BLOOD BY AUTOMATED COUNT: 17.2 % (ref 10–15)
GLUCOSE SERPL-MCNC: 89 MG/DL (ref 70–99)
HCT VFR BLD AUTO: 45.9 % (ref 35–47)
HGB BLD-MCNC: 14.3 G/DL (ref 11.7–15.7)
HOLD SPECIMEN: NORMAL
MCH RBC QN AUTO: 27.6 PG (ref 26.5–33)
MCHC RBC AUTO-ENTMCNC: 31.2 G/DL (ref 31.5–36.5)
MCV RBC AUTO: 88 FL (ref 78–100)
PLATELET # BLD AUTO: 151 10E3/UL (ref 150–450)
POTASSIUM SERPL-SCNC: 3.8 MMOL/L (ref 3.4–5.3)
PROT SERPL-MCNC: 7 G/DL (ref 6.4–8.3)
RBC # BLD AUTO: 5.19 10E6/UL (ref 3.8–5.2)
SODIUM SERPL-SCNC: 138 MMOL/L (ref 135–145)
WBC # BLD AUTO: 3.7 10E3/UL (ref 4–11)

## 2024-04-27 PROCEDURE — 96361 HYDRATE IV INFUSION ADD-ON: CPT

## 2024-04-27 PROCEDURE — 250N000013 HC RX MED GY IP 250 OP 250 PS 637

## 2024-04-27 PROCEDURE — 250N000011 HC RX IP 250 OP 636

## 2024-04-27 PROCEDURE — 99232 SBSQ HOSP IP/OBS MODERATE 35: CPT | Performed by: INTERNAL MEDICINE

## 2024-04-27 PROCEDURE — 85027 COMPLETE CBC AUTOMATED: CPT

## 2024-04-27 PROCEDURE — 80053 COMPREHEN METABOLIC PANEL: CPT

## 2024-04-27 PROCEDURE — 250N000013 HC RX MED GY IP 250 OP 250 PS 637: Performed by: INTERNAL MEDICINE

## 2024-04-27 PROCEDURE — G0378 HOSPITAL OBSERVATION PER HR: HCPCS

## 2024-04-27 PROCEDURE — 36415 COLL VENOUS BLD VENIPUNCTURE: CPT

## 2024-04-27 PROCEDURE — 96376 TX/PRO/DX INJ SAME DRUG ADON: CPT

## 2024-04-27 PROCEDURE — 258N000003 HC RX IP 258 OP 636: Performed by: INTERNAL MEDICINE

## 2024-04-27 RX ORDER — SODIUM CHLORIDE, SODIUM LACTATE, POTASSIUM CHLORIDE, CALCIUM CHLORIDE 600; 310; 30; 20 MG/100ML; MG/100ML; MG/100ML; MG/100ML
INJECTION, SOLUTION INTRAVENOUS CONTINUOUS
Status: DISCONTINUED | OUTPATIENT
Start: 2024-04-27 | End: 2024-04-27

## 2024-04-27 RX ORDER — SODIUM CHLORIDE, SODIUM LACTATE, POTASSIUM CHLORIDE, CALCIUM CHLORIDE 600; 310; 30; 20 MG/100ML; MG/100ML; MG/100ML; MG/100ML
INJECTION, SOLUTION INTRAVENOUS CONTINUOUS
Status: ACTIVE | OUTPATIENT
Start: 2024-04-27 | End: 2024-04-27

## 2024-04-27 RX ADMIN — SERTRALINE HYDROCHLORIDE 100 MG: 100 TABLET ORAL at 09:05

## 2024-04-27 RX ADMIN — CEFTRIAXONE SODIUM 2 G: 2 INJECTION, POWDER, FOR SOLUTION INTRAMUSCULAR; INTRAVENOUS at 22:55

## 2024-04-27 RX ADMIN — METOPROLOL SUCCINATE 25 MG: 25 TABLET, EXTENDED RELEASE ORAL at 09:05

## 2024-04-27 RX ADMIN — VANCOMYCIN HYDROCHLORIDE 125 MG: 125 CAPSULE ORAL at 17:45

## 2024-04-27 RX ADMIN — MICONAZOLE NITRATE: 20 POWDER TOPICAL at 21:38

## 2024-04-27 RX ADMIN — VANCOMYCIN HYDROCHLORIDE 125 MG: 125 CAPSULE ORAL at 12:07

## 2024-04-27 RX ADMIN — BUMETANIDE 2 MG: 1 TABLET ORAL at 09:03

## 2024-04-27 RX ADMIN — POTASSIUM CHLORIDE 20 MEQ: 1500 TABLET, EXTENDED RELEASE ORAL at 09:05

## 2024-04-27 RX ADMIN — VANCOMYCIN HYDROCHLORIDE 125 MG: 125 CAPSULE ORAL at 09:05

## 2024-04-27 RX ADMIN — VANCOMYCIN HYDROCHLORIDE 125 MG: 125 CAPSULE ORAL at 22:09

## 2024-04-27 RX ADMIN — SODIUM CHLORIDE, POTASSIUM CHLORIDE, SODIUM LACTATE AND CALCIUM CHLORIDE: 600; 310; 30; 20 INJECTION, SOLUTION INTRAVENOUS at 13:30

## 2024-04-27 RX ADMIN — VANCOMYCIN HYDROCHLORIDE 125 MG: 125 CAPSULE ORAL at 00:30

## 2024-04-27 RX ADMIN — ASPIRIN 81 MG: 81 TABLET, COATED ORAL at 09:05

## 2024-04-27 ASSESSMENT — ACTIVITIES OF DAILY LIVING (ADL)
ADLS_ACUITY_SCORE: 37
ADLS_ACUITY_SCORE: 51
ADLS_ACUITY_SCORE: 42
ADLS_ACUITY_SCORE: 38
ADLS_ACUITY_SCORE: 42
ADLS_ACUITY_SCORE: 42
ADLS_ACUITY_SCORE: 51
ADLS_ACUITY_SCORE: 42
ADLS_ACUITY_SCORE: 37
ADLS_ACUITY_SCORE: 42
ADLS_ACUITY_SCORE: 33
ADLS_ACUITY_SCORE: 42
ADLS_ACUITY_SCORE: 37
ADLS_ACUITY_SCORE: 37
ADLS_ACUITY_SCORE: 38
DEPENDENT_IADLS:: INDEPENDENT;TRANSPORTATION
ADLS_ACUITY_SCORE: 42
ADLS_ACUITY_SCORE: 33
ADLS_ACUITY_SCORE: 33
ADLS_ACUITY_SCORE: 37

## 2024-04-27 NOTE — PLAN OF CARE
Problem: Adult Inpatient Plan of Care  Goal: Absence of Hospital-Acquired Illness or Injury  Outcome: Progressing  Intervention: Prevent Skin Injury  Recent Flowsheet Documentation  Taken 4/27/2024 0200 by Yanet Abel, RN  Skin Protection: incontinence pads utilized  Intervention: Prevent Infection  Recent Flowsheet Documentation  Taken 4/27/2024 0200 by Yanet Abel, RN  Infection Prevention: rest/sleep promoted     Problem: Pain Acute  Goal: Optimal Pain Control and Function  Outcome: Progressing     Problem: Infection  Goal: Absence of Infection Signs and Symptoms  Outcome: Progressing     Problem: Adult Inpatient Plan of Care  Goal: Readiness for Transition of Care  Intervention: Mutually Develop Transition Plan  Recent Flowsheet Documentation  Taken 4/26/2024 2000 by Yanet Abel, RN  Equipment Currently Used at Home: grab bar, toilet   Goal Outcome Evaluation:         Pt. Alert, cooperative. Uses call light appropriately incontinent of urine and stool. Cellulitis lower extremities and lower abdomin, getting antibiotics per MAR.

## 2024-04-27 NOTE — PROGRESS NOTES
[facility]    Hospitalist Progress Note    Date of Service (when I saw the patient): 04/27/2024    Assessment & Plan   Bess Enamorado is a 48 year old female with who presents on 4/26/2024 with profuse watery diarrhea and was diagnosed with C. difficile colitis.     C. difficile colitis  C. difficile toxin positive in the emergency department.  No previous history of C. difficile infection.  Currently on cefdinir for left lower extremity cellulitis.  -Vancomycin 125 mg by mouth daily  -Give 1.5 L lactated Ringer's over 10 hours to compensate for volume loss from diarrhea    New cellulitis of abdominal wall  Intertrigo    Admitted 3/16-3/20 for LLE cellulitis due to staph lugdunensis. ID was consulted and she was initially tx with IV Meropenem, IV Vanc, IV Cefazolin, and then transitioned to IV Vanc. She was re-admitted 4/18-4/23 for LLE cellulitis and initially tx with IV Meropenem. Blood cultures on 4/18 grew Klebsiella oxytoca, strep gordonni, staph epi, so she was switched to IV Ceftriaxone. She was discharged on PO Cefdinir.     CT abdomen pelvis with contrast lists similar pannus skin thickening and subcutaneous edema, correlate for cellulitis.  - Ceftriaxone 2g IV daily  - Blood cultures pending  - Nystatin powder for intertrigo, which is likely the pathway of entry     Chronic heart failure with preserved ejection fraction (H)  Pulmonary hypertension, severe    Follows with Dr. Diaz, [facility]. PTA Bumetanide increased to 4mg BID (from 3mg daily).   -Continue metoprolol  -Hold bumetanide and potassium due to copious diarrhea     Chronic respiratory failure with hypoxia without hypercapnia  On recent discharge she was sent home with 4L home oxygen continuously. Has since decreased to 3L. Arrived with mild SOB, but SpO2 stable on 3L NC.      Concern for cirrhosis  Noted incidentally on recent admission. LFTs WNL on admission, but Tbili 2.4 (baseline 1.5-5).  -CMP in am  -Follow-up  "with gastroenterology as an outpatient     Obesity hypoventilation syndrome (H)  Morbid obesity (H)  BMP >50. Hasn't started Zepbound yet.   -Continue home CPAP     History of pulmonary embolism  History of right upper lobe PE discovered Feb 2022 in setting of acute covid infection, treated with xarelto which was stopped after 3 months treatment by pulmonology. Not currently on anticoagulation.      Hx of NSTEMI  Managed prior to admission with Aspirin 81, continue.     Multiple sclerosis (H)  Immobile. Doesn't follow with neuro. Symptoms stable. Not on meds PTA.     Moderate episode of recurrent major depressive disorder (H)  Generalized anxiety disorder  Managed prior to admission with Sertraline 100, continue.    Clinically Significant Risk Factors Present on Admission           # Drug Induced Platelet Defect: home medication list includes an antiplatelet medication   # Hypertension: Noted on problem list  # Chronic heart failure with preserved ejection fraction: heart failure noted on problem list and last echo with EF >50%     # Severe Obesity: Estimated body mass index is 51.62 kg/m  as calculated from the following:    Height as of this encounter: 1.626 m (5' 4\").    Weight as of this encounter: 136.4 kg (300 lb 11.3 oz).          # Financial/Environmental Concerns:         Diet: Combination Diet 2 gm NA Diet    DVT Prophylaxis: Aspirin  Brar Catheter: Not present  Code Status: Full Code  Lines: peripheral iv    Disposition: Expected discharge in 2-3 days once diarrhea resolved.    35 MINUTES SPENT BY ME on the date of service doing chart review, history, exam, documentation & further activities per the note.    Agapito Urias MD    Interval History   Patient complains of frequent large-volume watery diarrhea.  She states she feels thirsty.    -Data reviewed today: I reviewed all new labs and imaging results over the last 24 hours. I personally reviewed no images or EKG's today.    Physical Exam   Temp: " 99.3  F (37.4  C) Temp src: Oral BP: 101/62 Pulse: 89   Resp: 18 SpO2: 92 % O2 Device: Nasal cannula Oxygen Delivery: 5 LPM  Vitals:    04/26/24 1805 04/26/24 2026   Weight: 144.7 kg (319 lb 0.1 oz) 136.4 kg (300 lb 11.3 oz)     Vital Signs with Ranges  Temp:  [98.8  F (37.1  C)-99.3  F (37.4  C)] 99.3  F (37.4  C)  Pulse:  [86-99] 89  Resp:  [9-18] 18  BP: (101-129)/(47-87) 101/62  SpO2:  [90 %-97 %] 92 %  No intake/output data recorded.    Gen: Morbidly obese female, alert and oriented x 3, no acute distressed  HEENT: Atraumatic, normocephalic; sclera non-injected, anicterric; oral mucosa moist, no lesion, no exudate  Lungs: Clear to ausculation, no wheezes, no rhonchi, no rales  Heart: Regular rate, regular rhythm, no gallops, no rubs, no murmurs  GI: Bowel sound normal, no hepatosplenomegaly, no masses, non-tender, non-distended, no guarding, no rebound tenderness  Lymph: No lymphadenopathy, no edema  Skin: Erythema with tenderness involving the anterior pannus and folds.  Severe bilateral lower extremity chronic venous stasis.    Medications   Current Facility-Administered Medications   Medication Dose Route Frequency Provider Last Rate Last Admin     Current Facility-Administered Medications   Medication Dose Route Frequency Provider Last Rate Last Admin    aspirin EC tablet 81 mg  81 mg Oral Daily Russel Gonzalez PA-C   81 mg at 04/27/24 0905    bumetanide (BUMEX) tablet 4 mg  4 mg Oral BID Russel Gonzalez PA-C   2 mg at 04/27/24 0903    cefTRIAXone (ROCEPHIN) 2 g vial to attach to  ml bag for ADULTS or NS 50 ml bag for PEDS  2 g Intravenous Q24H Russel Gonzalez PA-C   2 g at 04/26/24 2347    metoprolol succinate ER (TOPROL XL) 24 hr tablet 25 mg  25 mg Oral Daily Russel Gonzalez PA-C   25 mg at 04/27/24 0905    potassium chloride sheila ER (KLOR-CON M20) CR tablet 20 mEq  20 mEq Oral BID Russel Gonzalez PA-C   20 mEq at 04/27/24 0905    sertraline (ZOLOFT) tablet 100 mg  100 mg Oral Daily  Russel Gonzalez PA-C   100 mg at 04/27/24 0905    vancomycin (VANCOCIN) capsule 125 mg  125 mg Oral 4x Daily Russel Gonzalez PA-C   125 mg at 04/27/24 1207       Data   Recent Labs   Lab 04/27/24  0736 04/26/24  1319 04/22/24  1708 04/22/24  0717   WBC 3.7* 4.6  --  4.2   HGB 14.3 15.7  --  14.0   MCV 88 86  --  88    184  --  157   INR  --   --   --  1.35*    137  --  137   POTASSIUM 3.8 4.3 3.6 3.1*   CHLORIDE 103 100  --  100   CO2 25 28  --  29   BUN 11.9 18.5  --  9.2   CR 0.70 0.70  --  0.62   ANIONGAP 10 9  --  8   EVITA 9.3 10.1*  --  9.2   GLC 89 84  --  89   ALBUMIN 3.2* 3.6  --  2.9*   PROTTOTAL 7.0 8.3  --  7.0   BILITOTAL 1.4* 2.4*  --  1.5*   ALKPHOS 63 73  --  62   ALT 13 15  --  12   AST 32 34  --  17       Recent Results (from the past 24 hour(s))   CT Chest (PE) Abdomen Pelvis w Contrast    Narrative    CT CHEST PE ABDOMEN AND PELVIS WITH CONTRAST 4/26/2024 3:35 PM    CLINICAL HISTORY: Short of air, recent hospitalization for cellulitis  with sepsis, history of PE, diarrhea abdominal discomfort.    TECHNIQUE: CT scan of the chest, abdomen, and pelvis was performed  following injection of IV contrast. Multiplanar reformats were  obtained. Dose reduction techniques were used.     CONTRAST: 100mL Isovue-370    COMPARISON: CT PE 4/18/2024. CT abdomen and pelvis 3/16/2024     FINDINGS:   ANGIOGRAM CHEST: Similar dilated main pulmonary arteries. No  convincing acute pulmonary embolus. Similar dilated right ventricle.  Thoracic aorta is nonaneurysmal.     LOWER NECK: Unremarkable.    LUNGS AND PLEURA: No focal consolidation or pleural effusion. Similar  mosaic attenuation of the lung parenchyma. Calcified granulomas.    AIRWAYS: Patent.    HEART: No pericardial effusion. No coronary calcifications. Mitral  valve annular calcification.    MEDIASTINUM AND DIPTI: Similar mildly enlarged upper mediastinal (6/5),  precarinal and left axillary lymph nodes measuring up to 1.3  cm  (6/23).    HEPATOBILIARY: Similar heterogeneous enhancement and lobular contour  of the liver. No suspicious mass. Cholecystectomy.    SPLEEN: Unremarkable.    PANCREAS: Unremarkable.    ADRENAL GLANDS: Unremarkable.    KIDNEYS/URETERS/BLADDER: Nonobstructing right renal calculus.    BOWEL: No bowel dilatation or wall thickening.    LYMPH NODES AND PERITONEUM: Similar scattered prominent  retroperitoneal and pelvic lymph nodes.     VASCULATURE: Patent recannulized paraumbilical vein.    PELVIS: Similar in size mobile right adnexal dermoid measuring 9.2 cm.  No new surrounding inflammation.    MUSCULOSKELETAL: Similar lower abdominal wall thickening and  subcutaneous edema. No fluid collection. No aggressive osseous lesion.  Degenerative changes of the spine. Remote left rib fractures.    ADDITIONAL FINDINGS: None.      Impression    IMPRESSION:  1.  No convincing acute pulmonary embolus or process within the chest.  2.  Similar pannus skin thickening and subcutaneous edema. Correlate  for cellulitis.  3.  Similar dilated main pulmonary artery and right ventricle  suggestive of pulmonary arterial hypertension.  4.  Morphologic features of cirrhosis, possibly cardiac related.  5.  Similar mildly enlarged mediastinal and left axillary lymph nodes,  possibly reactive. Attention on follow-up.  6.  Similar right adnexal mobile dermoid.    BRODY CHUNG MD         SYSTEM ID:  [id number]

## 2024-04-27 NOTE — CONSULTS
Care Management Initial Consult    General Information  Assessment completed with: Patient,    Type of CM/SW Visit: Initial Assessment    Primary Care Provider verified and updated as needed: Yes   Readmission within the last 30 days: previous discharge plan unsuccessful   Return Category: Exacerbation of disease  Reason for Consult: discharge planning  Advance Care Planning: Advance Care Planning Reviewed: no concerns identified        Communication Assessment  Patient's communication style: spoken language (English or Bilingual)    Hearing Difficulty or Deaf: no   Wear Glasses or Blind: no    Cognitive  Cognitive/Neuro/Behavioral: WDL                      Living Environment:   People in home: alone     Current living Arrangements: house      Able to return to prior arrangements: yes     Family/Social Support:  Care provided by: self, homecare agency  Provides care for: no one  Marital Status: Single  Sibling(s)          Description of Support System: Supportive, Involved    Support Assessment: Adequate social supports, Adequate family and caregiver support    Current Resources:   Patient receiving home care services: Yes  Skilled Home Care Services: Skilled Nursing, Home Health Aid, Physical Therapy  Community Resources: Home Care  Equipment currently used at home: grab bar, toilet  Supplies currently used at home: Incontinence Supplies, Oxygen Tubing/Supplies (CPAP)    Employment/Financial:  Employment Status: disabled     Financial Concerns: none   Does the patient's insurance plan have a 3 day qualifying hospital stay waiver?  No    Lifestyle & Psychosocial Needs:  Social Determinants of Health     Food Insecurity: Low Risk  (3/21/2024)    Food Insecurity     Within the past 12 months, did you worry that your food would run out before you got money to buy more?: No     Within the past 12 months, did the food you bought just not last and you didn t have money to get more?: No   Depression: Not at risk (2/7/2024)     PHQ-2     PHQ-2 Score: 2   Housing Stability: Low Risk  (3/21/2024)    Housing Stability     Do you have housing? : Yes     Are you worried about losing your housing?: No   Tobacco Use: Low Risk  (4/17/2024)    Patient History     Smoking Tobacco Use: Never     Smokeless Tobacco Use: Never     Passive Exposure: Not on file   Financial Resource Strain: Low Risk  (9/25/2023)    Financial Resource Strain     Within the past 12 months, have you or your family members you live with been unable to get utilities (heat, electricity) when it was really needed?: No   Alcohol Use: Not on file   Transportation Needs: Low Risk  (3/21/2024)    Transportation Needs     Within the past 12 months, has lack of transportation kept you from medical appointments, getting your medicines, non-medical meetings or appointments, work, or from getting things that you need?: No   Physical Activity: Inactive (3/21/2024)    Exercise Vital Sign     Days of Exercise per Week: 0 days     Minutes of Exercise per Session: 0 min   Interpersonal Safety: Low Risk  (9/27/2023)    Interpersonal Safety     Do you feel physically and emotionally safe where you currently live?: Yes     Within the past 12 months, have you been hit, slapped, kicked or otherwise physically hurt by someone?: No     Within the past 12 months, have you been humiliated or emotionally abused in other ways by your partner or ex-partner?: No   Stress: No Stress Concern Present (12/4/2020)    [employer] [place] [employer] - Occupational Stress Questionnaire     Feeling of Stress : Only a little   Social Connections: Unknown (3/1/2022)    Social Connection and Isolation Panel [NHANES]     Frequency of Communication with Friends and Family: Twice a week     Frequency of Social Gatherings with Friends and Family: Twice a week     Attends Evangelical Services: Not on file     Active Member of Clubs or Organizations: Not on file     Attends Club or Organization Meetings: Not on  file     Marital Status: Not on file   Health Literacy: Not on file     Functional Status:  Prior to admission patient needed assistance:   Dependent ADLs:: Ambulation-walker, Bathing  Dependent IADLs:: Independent, Transportation    Mental Health Status:  Mental Health Status: No Current Concerns       Chemical Dependency Status:  Chemical Dependency Status: No Current Concerns           Values/Beliefs:  Spiritual, Cultural Beliefs, Restoration Practices, Values that affect care: no             Additional Information:    Referral received for discharge planning.     Patient was recently hospitalized at [facility] with left lower extremity cellulitis. Patient is now re-admitted to the hospital for C. Diff infection.     Patient is current with [facility] (Phone: [phone]) PT/OT/RN /HHA services.     Per  note on 4/19- [facility] [place] [facility] [place]  At baseline, patient uses 2WW or wheelchair for mobility and is able to transfer self independently. She is on 0.5 L O2 at baseline.       Met with patient at bedside, confirmed the above information, and discussed discharge planning. Patient is wanting to return home with her current home care services when medically stable.     PLAN: Home with reumption of HC- PT/OT/RN/HHA      IVÁN MULLIGAN RN

## 2024-04-27 NOTE — ED NOTES
"[facility]   Admission Handoff    The patient is Bess Enamorado, 48 year old who arrived in the ED by AMBULANCE from home with a complaint of Shortness of Breath and Medication Reaction  . The patient's current symptoms are new and during this time the symptoms have remained the same. In the ED, patient was diagnosed with   Final diagnoses:   Antibiotic-associated diarrhea   Weakness   Cellulitis of abdominal wall - Pannus         Needed?: No    Allergies:    Allergies   Allergen Reactions    Nkda [No Known Drug Allergy]        Past Medical Hx:   Past Medical History:   Diagnosis Date    Acute on chronic diastolic congestive heart failure (H) 02/09/2022    Arthritis 2019    Hips    ASCUS favor benign 08/2015    Neg HPV    Depressive disorder 2010    H/O colposcopy with cervical biopsy 09/14/2015    Bx & ECC - negative    Hypertension 2019    LSIL on Pap smear 07/2014    Neg high risk HPV    Multiple sclerosis (H) 08/29/2005 9/18/200pt dx with MS 2004--dx by neurolgist and is followed by Dr. Harris    Myocardial infarction (H)     Obese     Pneumonia due to infectious organism, unspecified laterality, unspecified part of lung 02/08/2022    Shingles 10/2016    SIRS (systemic inflammatory response syndrome) (H) 03/04/2021    Sleep apnea     UNSPEC CONSTIPATION 09/18/2006 9/18/2006   Has tried otc suppository and otc lax.        Initial vitals were: BP: 117/79  Pulse: 87  Resp: 13  Height: 162.6 cm (5' 4\")  Weight: 144.7 kg (319 lb 0.1 oz)  SpO2: 95 %   Recent vital Signs: /70   Pulse 91   Resp 10   Ht 1.626 m (5' 4\")   Wt 144.7 kg (319 lb 0.1 oz)   LMP  (LMP Unknown)   SpO2 93%   BMI 54.76 kg/m      Elimination Status: Continent: No     Activity Level: 2 assist    Fall Status: Reason for falls risk:  Mobility  nonskid shoes/slippers when out of bed, arm band in place, patient and family education, and activity supervised    Baseline Mental status: WDL  Current " Mental Status changes: at basesline    Infection present or suspected this encounter: yes skin/wound/contact  Sepsis suspected: No    Isolation type: Enteric    Bariatric equipment needed?: Yes    In the ED these meds were given:   Medications   sodium chloride 0.9% BOLUS 1,000 mL (0 mLs Intravenous Stopped 4/26/24 1440)   ondansetron (ZOFRAN) injection 4 mg (4 mg Intravenous $Given 4/26/24 1359)   iopamidol (ISOVUE-370) solution 96 mL (96 mLs Intravenous $Given 4/26/24 1515)   sodium chloride 0.9 % bag 500mL for CT scan flush use (100 mLs Intravenous $Given 4/26/24 1516)       Drips running?  No    Home pump  No    Current LDAs: Peripheral IV: Site Right upper arm; Gauge 20g  none     Results:   Labs/Imaging  Ordered and Resulted from Time of ED Arrival Up to the Time of Departure from the ED  Results for orders placed or performed during the hospital encounter of 04/26/24 (from the past 24 hour(s))   [place] Draw    Narrative    The following orders were created for panel order [place] Draw.  Procedure                               Abnormality         Status                     ---------                               -----------         ------                     Extra Green Top (Lithium...[[id number]]                      Final result               Extra Purple Top Tube[[id number]]                            Final result               Extra Heparinized Syringe[[id number]]                        Final result                 Please view results for these tests on the individual orders.   Extra Green Top (Lithium Heparin) Tube   Result Value Ref Range    Hold Specimen jic    Extra Purple Top Tube   Result Value Ref Range    Hold Specimen JIC    Extra Heparinized Syringe   Result Value Ref Range    Hold Specimen     CBC with platelets, differential    Narrative    The following orders were created for panel order CBC with platelets, differential.  Procedure                               Abnormality         Status                      ---------                               -----------         ------                     CBC with platelets and d...[[id number]]  Abnormal            Final result                 Please view results for these tests on the individual orders.   Comprehensive metabolic panel   Result Value Ref Range    Sodium 137 135 - 145 mmol/L    Potassium 4.3 3.4 - 5.3 mmol/L    Carbon Dioxide (CO2) 28 22 - 29 mmol/L    Anion Gap 9 7 - 15 mmol/L    Urea Nitrogen 18.5 6.0 - 20.0 mg/dL    Creatinine 0.70 0.51 - 0.95 mg/dL    GFR Estimate >90 >60 mL/min/1.73m2    Calcium 10.1 (H) 8.6 - 10.0 mg/dL    Chloride 100 98 - 107 mmol/L    Glucose 84 70 - 99 mg/dL    Alkaline Phosphatase 73 40 - 150 U/L    AST 34 0 - 45 U/L    ALT 15 0 - 50 U/L    Protein Total 8.3 6.4 - 8.3 g/dL    Albumin 3.6 3.5 - 5.2 g/dL    Bilirubin Total 2.4 (H) <=1.2 mg/dL   Lactic acid whole blood   Result Value Ref Range    Lactic Acid 2.0 0.7 - 2.0 mmol/L   CRP Inflammation   Result Value Ref Range    CRP Inflammation 16.42 (H) <5.00 mg/L   CBC with platelets and differential   Result Value Ref Range    WBC Count 4.6 4.0 - 11.0 10e3/uL    RBC Count 5.66 (H) 3.80 - 5.20 10e6/uL    Hemoglobin 15.7 11.7 - 15.7 g/dL    Hematocrit 48.8 (H) 35.0 - 47.0 %    MCV 86 78 - 100 fL    MCH 27.7 26.5 - 33.0 pg    MCHC 32.2 31.5 - 36.5 g/dL    RDW 17.2 (H) 10.0 - 15.0 %    Platelet Count 184 150 - 450 10e3/uL    % Neutrophils 83 %    % Lymphocytes 7 %    % Monocytes 7 %    % Eosinophils 2 %    % Basophils 1 %    % Immature Granulocytes 0 %    NRBCs per 100 WBC 0 <1 /100    Absolute Neutrophils 3.9 1.6 - 8.3 10e3/uL    Absolute Lymphocytes 0.3 (L) 0.8 - 5.3 10e3/uL    Absolute Monocytes 0.3 0.0 - 1.3 10e3/uL    Absolute Eosinophils 0.1 0.0 - 0.7 10e3/uL    Absolute Basophils 0.0 0.0 - 0.2 10e3/uL    Absolute Immature Granulocytes 0.0 <=0.4 10e3/uL    Absolute NRBCs 0.0 10e3/uL   Blood gas venous   Result Value Ref Range    pH Venous 7.44 (H) 7.32 - 7.43    pCO2 Venous 42  40 - 50 mm Hg    pO2 Venous 22 (L) 25 - 47 mm Hg    Bicarbonate Venous 29 (H) 21 - 28 mmol/L    Base Excess/Deficit Venous 4.0 (H) -3.0 - 3.0 mmol/L    FIO2      Oxyhemoglobin Venous 36 (L) 70 - 75 %    O2 Sat, Venous 36.8 (L) 70.0 - 75.0 %    Narrative    In healthy individuals, oxyhemoglobin (O2Hb) and oxygen saturation (SO2) are approximately equal. In the presence of dyshemoglobins, oxyhemoglobin can be considerably lower than oxygen saturation.   CT Chest (PE) Abdomen Pelvis w Contrast    Narrative    CT CHEST PE ABDOMEN AND PELVIS WITH CONTRAST 4/26/2024 3:35 PM    CLINICAL HISTORY: Short of air, recent hospitalization for cellulitis  with sepsis, history of PE, diarrhea abdominal discomfort.    TECHNIQUE: CT scan of the chest, abdomen, and pelvis was performed  following injection of IV contrast. Multiplanar reformats were  obtained. Dose reduction techniques were used.     CONTRAST: 100mL Isovue-370    COMPARISON: CT PE 4/18/2024. CT abdomen and pelvis 3/16/2024     FINDINGS:   ANGIOGRAM CHEST: Similar dilated main pulmonary arteries. No  convincing acute pulmonary embolus. Similar dilated right ventricle.  Thoracic aorta is nonaneurysmal.     LOWER NECK: Unremarkable.    LUNGS AND PLEURA: No focal consolidation or pleural effusion. Similar  mosaic attenuation of the lung parenchyma. Calcified granulomas.    AIRWAYS: Patent.    HEART: No pericardial effusion. No coronary calcifications. Mitral  valve annular calcification.    MEDIASTINUM AND DIPTI: Similar mildly enlarged upper mediastinal (6/5),  precarinal and left axillary lymph nodes measuring up to 1.3 cm  (6/23).    HEPATOBILIARY: Similar heterogeneous enhancement and lobular contour  of the liver. No suspicious mass. Cholecystectomy.    SPLEEN: Unremarkable.    PANCREAS: Unremarkable.    ADRENAL GLANDS: Unremarkable.    KIDNEYS/URETERS/BLADDER: Nonobstructing right renal calculus.    BOWEL: No bowel dilatation or wall thickening.    LYMPH NODES AND  PERITONEUM: Similar scattered prominent  retroperitoneal and pelvic lymph nodes.     VASCULATURE: Patent recannulized paraumbilical vein.    PELVIS: Similar in size mobile right adnexal dermoid measuring 9.2 cm.  No new surrounding inflammation.    MUSCULOSKELETAL: Similar lower abdominal wall thickening and  subcutaneous edema. No fluid collection. No aggressive osseous lesion.  Degenerative changes of the spine. Remote left rib fractures.    ADDITIONAL FINDINGS: None.      Impression    IMPRESSION:  1.  No convincing acute pulmonary embolus or process within the chest.  2.  Similar pannus skin thickening and subcutaneous edema. Correlate  for cellulitis.  3.  Similar dilated main pulmonary artery and right ventricle  suggestive of pulmonary arterial hypertension.  4.  Morphologic features of cirrhosis, possibly cardiac related.  5.  Similar mildly enlarged mediastinal and left axillary lymph nodes,  possibly reactive. Attention on follow-up.  6.  Similar right adnexal mobile dermoid.    BRODY CHUNG MD         SYSTEM ID:  [id number]       For the majority of the shift this patient's behavior was Green     Cardiac Rhythm: Normal Sinus  Pt needs tele? No  Skin/wound Issues:  Cellulitis    Code Status: Full Code    Pain control: good    Nausea control: good    Abnormal labs/tests/findings requiring intervention: n/a    Patient tested for COVID 19 prior to admission: NO     OBS brochure/video discussed/provided to patient/family: Yes     Family present during ED course? No     Family Comments/Social Situation comments: n/a    Tasks needing completion: None    Bam Piage RN

## 2024-04-28 LAB
ANION GAP SERPL CALCULATED.3IONS-SCNC: 12 MMOL/L (ref 7–15)
BASOPHILS # BLD AUTO: 0 10E3/UL (ref 0–0.2)
BASOPHILS NFR BLD AUTO: 1 %
BUN SERPL-MCNC: 11.4 MG/DL (ref 6–20)
CALCIUM SERPL-MCNC: 8.9 MG/DL (ref 8.6–10)
CHLORIDE SERPL-SCNC: 101 MMOL/L (ref 98–107)
CREAT SERPL-MCNC: 0.72 MG/DL (ref 0.51–0.95)
DEPRECATED HCO3 PLAS-SCNC: 25 MMOL/L (ref 22–29)
EGFRCR SERPLBLD CKD-EPI 2021: >90 ML/MIN/1.73M2
EOSINOPHIL # BLD AUTO: 0.1 10E3/UL (ref 0–0.7)
EOSINOPHIL NFR BLD AUTO: 2 %
ERYTHROCYTE [DISTWIDTH] IN BLOOD BY AUTOMATED COUNT: 17.4 % (ref 10–15)
GLUCOSE SERPL-MCNC: 82 MG/DL (ref 70–99)
HCT VFR BLD AUTO: 46 % (ref 35–47)
HGB BLD-MCNC: 13.9 G/DL (ref 11.7–15.7)
IMM GRANULOCYTES # BLD: 0 10E3/UL
IMM GRANULOCYTES NFR BLD: 0 %
LYMPHOCYTES # BLD AUTO: 1 10E3/UL (ref 0.8–5.3)
LYMPHOCYTES NFR BLD AUTO: 22 %
MCH RBC QN AUTO: 27.1 PG (ref 26.5–33)
MCHC RBC AUTO-ENTMCNC: 30.2 G/DL (ref 31.5–36.5)
MCV RBC AUTO: 90 FL (ref 78–100)
MONOCYTES # BLD AUTO: 0.5 10E3/UL (ref 0–1.3)
MONOCYTES NFR BLD AUTO: 10 %
NEUTROPHILS # BLD AUTO: 3.1 10E3/UL (ref 1.6–8.3)
NEUTROPHILS NFR BLD AUTO: 65 %
NRBC # BLD AUTO: 0 10E3/UL
NRBC BLD AUTO-RTO: 0 /100
PLATELET # BLD AUTO: 171 10E3/UL (ref 150–450)
POTASSIUM SERPL-SCNC: 3.4 MMOL/L (ref 3.4–5.3)
RBC # BLD AUTO: 5.13 10E6/UL (ref 3.8–5.2)
SODIUM SERPL-SCNC: 138 MMOL/L (ref 135–145)
WBC # BLD AUTO: 4.7 10E3/UL (ref 4–11)

## 2024-04-28 PROCEDURE — 85025 COMPLETE CBC W/AUTO DIFF WBC: CPT | Performed by: INTERNAL MEDICINE

## 2024-04-28 PROCEDURE — 80048 BASIC METABOLIC PNL TOTAL CA: CPT | Performed by: INTERNAL MEDICINE

## 2024-04-28 PROCEDURE — 36415 COLL VENOUS BLD VENIPUNCTURE: CPT | Performed by: INTERNAL MEDICINE

## 2024-04-28 PROCEDURE — 250N000011 HC RX IP 250 OP 636

## 2024-04-28 PROCEDURE — 99232 SBSQ HOSP IP/OBS MODERATE 35: CPT | Performed by: INTERNAL MEDICINE

## 2024-04-28 PROCEDURE — 250N000013 HC RX MED GY IP 250 OP 250 PS 637: Performed by: INTERNAL MEDICINE

## 2024-04-28 PROCEDURE — 250N000013 HC RX MED GY IP 250 OP 250 PS 637

## 2024-04-28 PROCEDURE — G0378 HOSPITAL OBSERVATION PER HR: HCPCS

## 2024-04-28 PROCEDURE — 120N000001 HC R&B MED SURG/OB

## 2024-04-28 RX ORDER — BUMETANIDE 1 MG/1
4 TABLET ORAL 2 TIMES DAILY
Status: DISCONTINUED | OUTPATIENT
Start: 2024-04-28 | End: 2024-04-28

## 2024-04-28 RX ORDER — BUMETANIDE 1 MG/1
2 TABLET ORAL 2 TIMES DAILY
Status: DISCONTINUED | OUTPATIENT
Start: 2024-04-28 | End: 2024-05-01

## 2024-04-28 RX ADMIN — MICONAZOLE NITRATE: 20 POWDER TOPICAL at 22:31

## 2024-04-28 RX ADMIN — POTASSIUM CHLORIDE 20 MEQ: 1500 TABLET, EXTENDED RELEASE ORAL at 22:29

## 2024-04-28 RX ADMIN — BUMETANIDE 2 MG: 1 TABLET ORAL at 22:29

## 2024-04-28 RX ADMIN — SERTRALINE HYDROCHLORIDE 100 MG: 100 TABLET ORAL at 08:23

## 2024-04-28 RX ADMIN — MICONAZOLE NITRATE: 20 POWDER TOPICAL at 08:26

## 2024-04-28 RX ADMIN — VANCOMYCIN HYDROCHLORIDE 125 MG: 125 CAPSULE ORAL at 22:29

## 2024-04-28 RX ADMIN — VANCOMYCIN HYDROCHLORIDE 125 MG: 125 CAPSULE ORAL at 08:23

## 2024-04-28 RX ADMIN — VANCOMYCIN HYDROCHLORIDE 125 MG: 125 CAPSULE ORAL at 12:29

## 2024-04-28 RX ADMIN — BUMETANIDE 2 MG: 1 TABLET ORAL at 10:59

## 2024-04-28 RX ADMIN — CEFTRIAXONE SODIUM 2 G: 2 INJECTION, POWDER, FOR SOLUTION INTRAMUSCULAR; INTRAVENOUS at 22:29

## 2024-04-28 RX ADMIN — VANCOMYCIN HYDROCHLORIDE 125 MG: 125 CAPSULE ORAL at 17:57

## 2024-04-28 RX ADMIN — ASPIRIN 81 MG: 81 TABLET, COATED ORAL at 08:23

## 2024-04-28 ASSESSMENT — ACTIVITIES OF DAILY LIVING (ADL)
ADLS_ACUITY_SCORE: 51
ADLS_ACUITY_SCORE: 44
ADLS_ACUITY_SCORE: 51
ADLS_ACUITY_SCORE: 44
ADLS_ACUITY_SCORE: 51
ADLS_ACUITY_SCORE: 44
ADLS_ACUITY_SCORE: 44
ADLS_ACUITY_SCORE: 51

## 2024-04-28 NOTE — PROGRESS NOTES
Patient utilizing 5-6  LPM oxygen supplementation to maintain adequate oxygenation, patient does use oxygen at home. Patient has had 3 bowel movements from 2002 on 4.27.24 to 6:22 AM.     Plan to discharge home with [facility] home care once diarrhea has resolved.

## 2024-04-28 NOTE — PLAN OF CARE
"Goal Outcome Evaluation:      Plan of Care Reviewed With: patient    Overall Patient Progress: improvingOverall Patient Progress: improving    Outcome Evaluation: Patient is alert and orientated x4. Has not gotten out of bed d/t frequent loose stools and incontinent of urine. On 5L of oxygen via nasal cannula. Does not have someone to bring in her home CPAP. Patient would like to have a rectal tube d/t skin breaking down on her buttocks. She expressed, \"I don't know how much longer I can handle this\".    Problem: Adult Inpatient Plan of Care  Goal: Absence of Hospital-Acquired Illness or Injury  Intervention: Prevent Skin Injury  Recent Flowsheet Documentation  Taken 4/27/2024 1538 by Rajani Melton, RN  Skin Protection: incontinence pads utilized       "

## 2024-04-28 NOTE — PLAN OF CARE
Problem: Adult Inpatient Plan of Care  Goal: Plan of Care Review  Description: The Plan of Care Review/Shift note should be completed every shift.  The Outcome Evaluation is a brief statement about your assessment that the patient is improving, declining, or no change.  This information will be displayed automatically on your shift  note.  Outcome: Not Progressing  Flowsheets (Taken 4/28/2024 0227)  Outcome Evaluation: Patient alert and oriented x4. Patient continues to utilize oxygen supplementation. Patient continues with loose stools.  Overall Patient Progress: no change     Problem: Adult Inpatient Plan of Care  Goal: Absence of Hospital-Acquired Illness or Injury  Intervention: Identify and Manage Fall Risk  Recent Flowsheet Documentation  Taken 4/28/2024 0030 by Cherie Cook RN  Safety Promotion/Fall Prevention:   activity supervised   patient and family education   room near nurse's station  Intervention: Prevent Skin Injury  Recent Flowsheet Documentation  Taken 4/28/2024 0030 by Cherie Cook RN  Skin Protection: incontinence pads utilized  Intervention: Prevent Infection  Recent Flowsheet Documentation  Taken 4/28/2024 0030 by Cherie Cook RN  Infection Prevention:   rest/sleep promoted   single patient room provided   hand hygiene promoted     Problem: Pain Acute  Goal: Optimal Pain Control and Function  Intervention: Prevent or Manage Pain  Recent Flowsheet Documentation  Taken 4/28/2024 0030 by Cherie Cook RN  Sleep/Rest Enhancement: awakenings minimized  Medication Review/Management: medications reviewed  Intervention: Optimize Psychosocial Wellbeing  Recent Flowsheet Documentation  Taken 4/28/2024 0030 by Cherie Cook RN  Supportive Measures: active listening utilized     Problem: Infection  Goal: Absence of Infection Signs and Symptoms  Intervention: Prevent or Manage Infection  Recent Flowsheet Documentation  Taken 4/28/2024 0030 by Cherie Cook RN  Isolation  Precautions: enteric precautions maintained     Problem: Skin Injury Risk Increased  Goal: Skin Health and Integrity  Intervention: Plan: Nurse Driven Intervention: Moisture Management  Recent Flowsheet Documentation  Taken 4/28/2024 0030 by Cherie Cook RN  Moisture Interventions:   Incontinence pad   Wicking textile in skin fold (InterDry)  Intervention: Plan: Nurse Driven Intervention: Friction and Shear  Recent Flowsheet Documentation  Taken 4/28/2024 0030 by Cherie Cook RN  Friction/Shear Interventions:   HOB 30 degrees or less   Silicone foam sacral dressing  Intervention: Optimize Skin Protection  Recent Flowsheet Documentation  Taken 4/28/2024 0030 by Cherie Cook RN  Skin Protection: incontinence pads utilized   Goal Outcome Evaluation:           Overall Patient Progress: no changeOverall Patient Progress: no change    Outcome Evaluation: Patient alert and oriented x4. Patient continues to utilize oxygen supplementation. Patient continues with loose stools.

## 2024-04-28 NOTE — PROGRESS NOTES
Assumed patient care from [id number]  A/Ox 4  Assist of  2  Pain: 0/10  Diet: combo low NA    Oxygenation needs increased to 6L from 5L. Rectal tube inserted and removed shortly after d/t inefficiency and at pt request. Pt alexander area and coccyx remain red and excoriated. Pt states she is unable to tell when she needs to urinate/defecate. Brief placed to help keep stool and urine off buttock and legs.

## 2024-04-28 NOTE — PLAN OF CARE
Problem: Adult Inpatient Plan of Care  Goal: Plan of Care Review  Description: The Plan of Care Review/Shift note should be completed every shift.  The Outcome Evaluation is a brief statement about your assessment that the patient is improving, declining, or no change.  This information will be displayed automatically on your shift  note.  Outcome: Progressing     Goal Outcome Evaluation:    Loose stool x 2 today. Purewick back in place. Remains on 5L NC. Has not gotten out of bed, repositioning and encouraged her to weight shift. Redness/skin breakdown from loose stools/voids.

## 2024-04-28 NOTE — UTILIZATION REVIEW
Admission Status; Secondary Review Determination       Under the authority of the Utilization Management Committee, the utilization review process indicated a secondary review on the above patient. The review outcome is based on review of the medical records, discussions with staff, and applying clinical experience noted on the date of the review.     (x) Inpatient Status Appropriate - This patient's medical care is consistent with medical management for inpatient care and reasonable inpatient medical practice.     RATIONALE FOR DETERMINATION     Patient requires inpatient admission versus short stay observation or outpatient treatment for the following reasons:  48 year old with pmh of cirrhosis, persistent versus recurrent cellulitis, chronic respiratory failure, obesity,  and multiple sclerosis who was admitted on 4/26 for diarrhea and tested positive for c diff. Diarrhea is improving after starting vancomycin, but she has persistent cellulitis and was recently started on cefdinir. She is requiring additional time in the hospital for further IV antibiotics given persistent cellulitis, and with her history of recurrent admissions for IV antibiotics and already > 2 midnights appropriate for inpatient status.    The expected length of stay at the time of admission was more than 2 nights because of the severity of illness, intensity of service provided, and risk for adverse outcome. Inpatient admission is appropriate.         This document was produced using voice recognition software       The information on this document is developed by the utilization review team in order for the business office to ensure compliance. This only denotes the appropriateness of proper admission status and does not reflect the quality of care rendered.   The definitions of Inpatient Status and Observation Status used in making the determination above are those provided in the CMS Coverage Manual, Chapter 1 and Chapter 6, section  70.4.   Sincerely,   Anson Melgar DO  Physician Advisor  [facility].

## 2024-04-28 NOTE — PROGRESS NOTES
[facility]    Hospitalist Progress Note    Date of Service (when I saw the patient): 04/28/2024    Assessment & Plan   Bess Enamorado is a 48 year old female with who presents on 4/26/2024 with profuse watery diarrhea and was diagnosed with C. difficile colitis.     C. difficile colitis  C. difficile toxin positive in the emergency department.  No previous history of C. difficile infection.  Currently on cefdinir for left lower extremity cellulitis.  -No stools since afternoon of 4/27/2024  -Vancomycin 125 mg by mouth daily    New cellulitis of abdominal wall  Intertrigo    Admitted 3/16-3/20 for LLE cellulitis due to staph lugdunensis. ID was consulted and she was initially tx with IV Meropenem, IV Vanc, IV Cefazolin, and then transitioned to IV Vanc. She was re-admitted 4/18-4/23 for LLE cellulitis and initially tx with IV Meropenem. Blood cultures on 4/18 grew Klebsiella oxytoca, strep gordonni, staph epi, so she was switched to IV Ceftriaxone. She was discharged on PO Cefdinir.     CT abdomen pelvis with contrast lists similar pannus skin thickening and subcutaneous edema, correlate for cellulitis.  - Continue ceftriaxone 2g IV daily as abdominal wall cellulitis continues to have significant erythema within skin folds  - Blood cultures on 4/26/2024 NGTD  - Nystatin powder for intertrigo, which is likely the pathway of entry     Chronic heart failure with preserved ejection fraction (H)  Pulmonary hypertension, severe    Follows with Dr. Diaz,  cardiology. PTA Bumetanide increased to 4mg BID (from 3mg daily).   -Continue metoprolol  -Resume bumetanide and potassium in a.m.    Chronic respiratory failure with hypoxia without hypercapnia  On recent discharge she was sent home with 4L home oxygen continuously. Has since decreased to 3L. Arrived with mild SOB, but SpO2 stable on 3L NC.   -Currently requiring 5 L, resume Bumex     Concern for cirrhosis  Noted incidentally on recent  "admission. LFTs WNL on admission, but Tbili 2.4 (baseline 1.5-5).  -CMP in am  -Follow-up with gastroenterology as an outpatient     Obesity hypoventilation syndrome (H)  Morbid obesity (H)  BMP >50. Hasn't started Zepbound yet.   -Continue home CPAP     History of pulmonary embolism  History of right upper lobe PE discovered Feb 2022 in setting of acute covid infection, treated with xarelto which was stopped after 3 months treatment by pulmonology. Not currently on anticoagulation.      Hx of NSTEMI  Managed prior to admission with Aspirin 81, continue.     Multiple sclerosis (H)  Immobile. Doesn't follow with neuro. Symptoms stable. Not on meds PTA.     Moderate episode of recurrent major depressive disorder (H)  Generalized anxiety disorder  Managed prior to admission with Sertraline 100, continue.    Clinically Significant Risk Factors Present on Admission           # Drug Induced Platelet Defect: home medication list includes an antiplatelet medication   # Hypertension: Noted on problem list  # Chronic heart failure with preserved ejection fraction: heart failure noted on problem list and last echo with EF >50%     # Severe Obesity: Estimated body mass index is 51.62 kg/m  as calculated from the following:    Height as of this encounter: 1.626 m (5' 4\").    Weight as of this encounter: 136.4 kg (300 lb 11.3 oz).          # Financial/Environmental Concerns:         Diet: Combination Diet 2 gm NA Diet    DVT Prophylaxis: Aspirin  Brar Catheter: Not present  Code Status: Full Code  Lines: peripheral iv    Disposition: Expected discharge in 2-3 days once diarrhea resolved.    30 MINUTES SPENT BY ME on the date of service doing chart review, history, exam, documentation & further activities per the note.    Agapito Urias MD    Interval History   Patient reports no further diarrhea since yesterday afternoon.  Abdominal wall cellulitis remains tender and erythema unchanged.    -Data reviewed today: I reviewed " all new labs and imaging results over the last 24 hours. I personally reviewed no images or EKG's today.    Physical Exam   Temp: 97.8  F (36.6  C) Temp src: Oral BP: 99/65 Pulse: 67   Resp: 18 SpO2: 91 % O2 Device: Nasal cannula Oxygen Delivery: 5 LPM  Vitals:    04/26/24 1805 04/26/24 2026 04/28/24 0339   Weight: 144.7 kg (319 lb 0.1 oz) 136.4 kg (300 lb 11.3 oz) 141.6 kg (312 lb 2.7 oz)     Vital Signs with Ranges  Temp:  [97.6  F (36.4  C)-97.8  F (36.6  C)] 97.8  F (36.6  C)  Pulse:  [67-77] 67  Resp:  [18-20] 18  BP: ()/(54-70) 99/65  SpO2:  [91 %-92 %] 91 %  I/O last 3 completed shifts:  In: 119 [I.V.:119]  Out: -     Gen: Morbidly obese female, alert and oriented x 3, no acute distressed  HEENT: Atraumatic, normocephalic; sclera non-injected, anicterric; oral mucosa moist, no lesion, no exudate  Lungs: Clear to ausculation, no wheezes, no rhonchi, no rales  Heart: Regular rate, regular rhythm, no gallops, no rubs, no murmurs  GI: Bowel sound normal, no hepatosplenomegaly, no masses, non-tender, non-distended, no guarding, no rebound tenderness  Lymph: No lymphadenopathy, no edema  Skin: Erythema with tenderness involving the anterior pannus and folds.  Severe bilateral lower extremity chronic venous stasis.    Medications   Current Facility-Administered Medications   Medication Dose Route Frequency Provider Last Rate Last Admin     Current Facility-Administered Medications   Medication Dose Route Frequency Provider Last Rate Last Admin    aspirin EC tablet 81 mg  81 mg Oral Daily Russel Gonzalez PA-C   81 mg at 04/28/24 0823    [Held by provider] bumetanide (BUMEX) tablet 4 mg  4 mg Oral BID Russel Gonzalez PA-C   2 mg at 04/27/24 0903    cefTRIAXone (ROCEPHIN) 2 g vial to attach to  ml bag for ADULTS or NS 50 ml bag for PEDS  2 g Intravenous Q24H Russel Gonzalez PA-C   2 g at 04/27/24 9395    fiber modular (BANATROL TF) packet 1 packet  1 packet Oral Agapito Mosquera MD   1 packet  at 04/28/24 0858    metoprolol succinate ER (TOPROL XL) 24 hr tablet 25 mg  25 mg Oral Daily Russel Gonzalez PA-C   25 mg at 04/27/24 0905    miconazole (MICATIN) 2 % powder   Topical BID Agapito Urias MD   Given at 04/28/24 0826    [Held by provider] potassium chloride sheila ER (KLOR-CON M20) CR tablet 20 mEq  20 mEq Oral BID Russel Gonzalez PA-C   20 mEq at 04/27/24 0905    sertraline (ZOLOFT) tablet 100 mg  100 mg Oral Daily Russel Gonzalez PA-C   100 mg at 04/28/24 0823    vancomycin (VANCOCIN) capsule 125 mg  125 mg Oral 4x Daily Russel Gonzalez PA-C   125 mg at 04/28/24 0823       Data   Recent Labs   Lab 04/28/24  0533 04/27/24  0736 04/26/24  1319 04/22/24  1708 04/22/24  0717   WBC 4.7 3.7* 4.6  --  4.2   HGB 13.9 14.3 15.7  --  14.0   MCV 90 88 86  --  88    151 184  --  157   INR  --   --   --   --  1.35*    138 137  --  137   POTASSIUM 3.4 3.8 4.3   < > 3.1*   CHLORIDE 101 103 100  --  100   CO2 25 25 28  --  29   BUN 11.4 11.9 18.5  --  9.2   CR 0.72 0.70 0.70  --  0.62   ANIONGAP 12 10 9  --  8   EVITA 8.9 9.3 10.1*  --  9.2   GLC 82 89 84  --  89   ALBUMIN  --  3.2* 3.6  --  2.9*   PROTTOTAL  --  7.0 8.3  --  7.0   BILITOTAL  --  1.4* 2.4*  --  1.5*   ALKPHOS  --  63 73  --  62   ALT  --  13 15  --  12   AST  --  32 34  --  17    < > = values in this interval not displayed.       No results found for this or any previous visit (from the past 24 hour(s)).

## 2024-04-29 PROCEDURE — 250N000013 HC RX MED GY IP 250 OP 250 PS 637: Performed by: INTERNAL MEDICINE

## 2024-04-29 PROCEDURE — 99233 SBSQ HOSP IP/OBS HIGH 50: CPT | Performed by: INTERNAL MEDICINE

## 2024-04-29 PROCEDURE — 250N000013 HC RX MED GY IP 250 OP 250 PS 637

## 2024-04-29 PROCEDURE — 120N000001 HC R&B MED SURG/OB

## 2024-04-29 PROCEDURE — 250N000011 HC RX IP 250 OP 636: Performed by: INTERNAL MEDICINE

## 2024-04-29 PROCEDURE — 250N000011 HC RX IP 250 OP 636

## 2024-04-29 RX ORDER — BUMETANIDE 0.25 MG/ML
2 INJECTION INTRAMUSCULAR; INTRAVENOUS EVERY 8 HOURS
Status: COMPLETED | OUTPATIENT
Start: 2024-04-29 | End: 2024-04-29

## 2024-04-29 RX ADMIN — BUMETANIDE 2 MG: 0.25 INJECTION INTRAMUSCULAR; INTRAVENOUS at 23:58

## 2024-04-29 RX ADMIN — POTASSIUM CHLORIDE 20 MEQ: 1500 TABLET, EXTENDED RELEASE ORAL at 08:47

## 2024-04-29 RX ADMIN — MICONAZOLE NITRATE: 20 POWDER TOPICAL at 08:48

## 2024-04-29 RX ADMIN — MICONAZOLE NITRATE: 20 POWDER TOPICAL at 19:58

## 2024-04-29 RX ADMIN — SERTRALINE HYDROCHLORIDE 100 MG: 100 TABLET ORAL at 08:47

## 2024-04-29 RX ADMIN — ASPIRIN 81 MG: 81 TABLET, COATED ORAL at 08:48

## 2024-04-29 RX ADMIN — VANCOMYCIN HYDROCHLORIDE 125 MG: 125 CAPSULE ORAL at 12:55

## 2024-04-29 RX ADMIN — CEFTRIAXONE SODIUM 2 G: 2 INJECTION, POWDER, FOR SOLUTION INTRAMUSCULAR; INTRAVENOUS at 23:01

## 2024-04-29 RX ADMIN — VANCOMYCIN HYDROCHLORIDE 125 MG: 125 CAPSULE ORAL at 08:47

## 2024-04-29 RX ADMIN — VANCOMYCIN HYDROCHLORIDE 125 MG: 125 CAPSULE ORAL at 21:36

## 2024-04-29 RX ADMIN — METOPROLOL SUCCINATE 25 MG: 25 TABLET, EXTENDED RELEASE ORAL at 08:47

## 2024-04-29 RX ADMIN — VANCOMYCIN HYDROCHLORIDE 125 MG: 125 CAPSULE ORAL at 17:57

## 2024-04-29 RX ADMIN — BUMETANIDE 2 MG: 0.25 INJECTION INTRAMUSCULAR; INTRAVENOUS at 08:47

## 2024-04-29 RX ADMIN — BUMETANIDE 2 MG: 0.25 INJECTION INTRAMUSCULAR; INTRAVENOUS at 15:51

## 2024-04-29 RX ADMIN — POTASSIUM CHLORIDE 20 MEQ: 1500 TABLET, EXTENDED RELEASE ORAL at 19:58

## 2024-04-29 ASSESSMENT — ACTIVITIES OF DAILY LIVING (ADL)
ADLS_ACUITY_SCORE: 43
ADLS_ACUITY_SCORE: 43
ADLS_ACUITY_SCORE: 40
ADLS_ACUITY_SCORE: 42
ADLS_ACUITY_SCORE: 43
ADLS_ACUITY_SCORE: 40
ADLS_ACUITY_SCORE: 43
ADLS_ACUITY_SCORE: 40
ADLS_ACUITY_SCORE: 43
ADLS_ACUITY_SCORE: 42
ADLS_ACUITY_SCORE: 42
ADLS_ACUITY_SCORE: 40
ADLS_ACUITY_SCORE: 40
ADLS_ACUITY_SCORE: 43
ADLS_ACUITY_SCORE: 40
ADLS_ACUITY_SCORE: 44
ADLS_ACUITY_SCORE: 40
ADLS_ACUITY_SCORE: 43
ADLS_ACUITY_SCORE: 43
ADLS_ACUITY_SCORE: 40
ADLS_ACUITY_SCORE: 40

## 2024-04-29 NOTE — PROGRESS NOTES
[facility]    Hospitalist Progress Note    Date of Service (when I saw the patient): 04/29/2024    Assessment & Plan   Bess Enamorado is a 48 year old female with who presents on 4/26/2024 with profuse watery diarrhea and was diagnosed with C. difficile colitis.     C. difficile colitis  C. difficile toxin positive in the emergency department.  No previous history of C. difficile infection.  Currently on cefdinir for left lower extremity cellulitis.  -Diarrhea has improved with only 2 stools overnight  -Vancomycin 125 mg by mouth 4 x daily    New cellulitis of abdominal wall  Intertrigo    Admitted 3/16-3/20 for LLE cellulitis due to staph lugdunensis. ID was consulted and she was initially tx with IV Meropenem, IV Vanc, IV Cefazolin, and then transitioned to IV Vanc. She was re-admitted 4/18-4/23 for LLE cellulitis and initially tx with IV Meropenem. Blood cultures on 4/18 grew Klebsiella oxytoca, strep gordonni, staph epi, so she was switched to IV Ceftriaxone. She was discharged on PO Cefdinir.     CT abdomen pelvis with contrast lists similar pannus skin thickening and subcutaneous edema, correlate for cellulitis.  - Continue ceftriaxone 2g IV daily as abdominal wall cellulitis continues to have significant erythema within skin folds  - Blood cultures on 4/26/2024 NGTD  - Nystatin powder for intertrigo, which is likely the pathway of entry  - Erythema appears improved as of 4/29/2024     Acute on chronic heart failure with preserved ejection fraction (H)  Pulmonary hypertension, severe    Follows with Dr. Diaz,  cardiology. PTA Bumetanide increased to 4mg BID (from 3mg daily).   -Continue metoprolol K potassium in AM  -Give IV Bumex 2 mg 3 times daily on 4/29 then reevaluate in AM  -Check BMP    Chronic respiratory failure with hypoxia without hypercapnia  On recent discharge she was sent home with 4L home oxygen continuously. Has since decreased to 3L. Arrived with mild SOB, but  "SpO2 stable on 3L NC.   -Currently requiring 5 L, which is above patient's baseline 3 to 4 L, attempt diuresis with IV Bumex     Concern for cirrhosis  Probable congestive hepatitis  Noted incidentally on recent admission. LFTs WNL on admission, but Tbili 2.4 (baseline 1.5-5).  -CMP in am  -Follow-up with gastroenterology as an outpatient     Obesity hypoventilation syndrome (H)  Morbid obesity (H)  BMP >50. Hasn't started Zepbound yet.   -Continue home CPAP     History of pulmonary embolism  History of right upper lobe PE discovered Feb 2022 in setting of acute covid infection, treated with xarelto which was stopped after 3 months treatment by pulmonology. Not currently on anticoagulation.      Hx of NSTEMI  Managed prior to admission with Aspirin 81, continue.     Multiple sclerosis (H)  Immobile. Doesn't follow with neuro. Symptoms stable. Not on meds PTA.     Moderate episode of recurrent major depressive disorder (H)  Generalized anxiety disorder  Managed prior to admission with Sertraline 100, continue.    Clinically Significant Risk Factors Present on Admission           # Drug Induced Platelet Defect: home medication list includes an antiplatelet medication   # Hypertension: Noted on problem list  # Chronic heart failure with preserved ejection fraction: heart failure noted on problem list and last echo with EF >50%     # Severe Obesity: Estimated body mass index is 51.62 kg/m  as calculated from the following:    Height as of this encounter: 1.626 m (5' 4\").    Weight as of this encounter: 136.4 kg (300 lb 11.3 oz).          # Financial/Environmental Concerns:         Diet: Combination Diet 2 gm NA Diet    DVT Prophylaxis: Aspirin  Brar Catheter: Not present  Code Status: Full Code  Lines: peripheral iv    Disposition: Expected discharge in 1 to 2 days once hypoxia improved.    35 MINUTES SPENT BY ME on the date of service doing chart review, history, exam, documentation & further activities per the " note.    Agapito Urias MD    Interval History   Patient reports only 2 loose stools overnight.  Appetite is good.  She denies abdominal pain, fever, or chills.    -Data reviewed today: I reviewed all new labs and imaging results over the last 24 hours. I personally reviewed no images or EKG's today.    Physical Exam   Temp: 98.4  F (36.9  C) Temp src: Oral BP: 107/67 Pulse: 76   Resp: 18 SpO2: 91 % O2 Device: Nasal cannula with humidification Oxygen Delivery: 5 LPM  Vitals:    04/26/24 2026 04/28/24 0339 04/29/24 0500   Weight: 136.4 kg (300 lb 11.3 oz) 141.6 kg (312 lb 2.7 oz) 141 kg (310 lb 13.6 oz)     Vital Signs with Ranges  Temp:  [97.6  F (36.4  C)-98.6  F (37  C)] 98.4  F (36.9  C)  Pulse:  [73-80] 76  Resp:  [18] 18  BP: (107-117)/(67-73) 107/67  SpO2:  [91 %-93 %] 91 %  I/O last 3 completed shifts:  In: -   Out: 2200 [Urine:2200]    Gen: Morbidly obese female, alert and oriented x 3, no acute distressed  HEENT: Atraumatic, normocephalic; sclera non-injected, anicterric; oral mucosa moist, no lesion, no exudate  Lungs: Clear to ausculation, no wheezes, no rhonchi, no rales  Heart: Regular rate, regular rhythm, no gallops, no rubs, no murmurs  GI: Bowel sound normal, no hepatosplenomegaly, no masses, non-tender, non-distended, no guarding, no rebound tenderness  Lymph: No lymphadenopathy, no edema  Skin: Mild erythema without tenderness involving the anterior pannus and folds.  Severe bilateral lower extremity chronic venous stasis.    Medications   Current Facility-Administered Medications   Medication Dose Route Frequency Provider Last Rate Last Admin     Current Facility-Administered Medications   Medication Dose Route Frequency Provider Last Rate Last Admin    aspirin EC tablet 81 mg  81 mg Oral Daily Russel Gonzalez PA-C   81 mg at 04/29/24 0848    bumetanide (BUMEX) injection 2 mg  2 mg Intravenous Q8H Agapito Urias MD   2 mg at 04/29/24 0803    [Held by provider] bumetanide (BUMEX)  tablet 2 mg  2 mg Oral BID Agapito Urias MD   2 mg at 04/28/24 2229    cefTRIAXone (ROCEPHIN) 2 g vial to attach to  ml bag for ADULTS or NS 50 ml bag for PEDS  2 g Intravenous Q24H Russel Gonzalez PA-C   2 g at 04/28/24 2229    fiber modular (BANATROL TF) packet 1 packet  1 packet Oral TID Agapito Urias MD   1 packet at 04/28/24 2247    metoprolol succinate ER (TOPROL XL) 24 hr tablet 25 mg  25 mg Oral Daily Russel Gonzalez PA-C   25 mg at 04/29/24 0847    miconazole (MICATIN) 2 % powder   Topical BID Agapito Urias MD   Given at 04/29/24 0848    potassium chloride sheila ER (KLOR-CON M20) CR tablet 20 mEq  20 mEq Oral BID Agapito Urias MD   20 mEq at 04/29/24 0847    sertraline (ZOLOFT) tablet 100 mg  100 mg Oral Daily Russel Gonzalez PA-C   100 mg at 04/29/24 0847    vancomycin (VANCOCIN) capsule 125 mg  125 mg Oral 4x Daily Russel Gonzalez PA-C   125 mg at 04/29/24 0847       Data   Recent Labs   Lab 04/28/24  0533 04/27/24  0736 04/26/24  1319   WBC 4.7 3.7* 4.6   HGB 13.9 14.3 15.7   MCV 90 88 86    151 184    138 137   POTASSIUM 3.4 3.8 4.3   CHLORIDE 101 103 100   CO2 25 25 28   BUN 11.4 11.9 18.5   CR 0.72 0.70 0.70   ANIONGAP 12 10 9   EVITA 8.9 9.3 10.1*   GLC 82 89 84   ALBUMIN  --  3.2* 3.6   PROTTOTAL  --  7.0 8.3   BILITOTAL  --  1.4* 2.4*   ALKPHOS  --  63 73   ALT  --  13 15   AST  --  32 34       No results found for this or any previous visit (from the past 24 hour(s)).

## 2024-04-30 LAB
ANION GAP SERPL CALCULATED.3IONS-SCNC: 10 MMOL/L (ref 7–15)
BUN SERPL-MCNC: 7.3 MG/DL (ref 6–20)
CALCIUM SERPL-MCNC: 9 MG/DL (ref 8.6–10)
CHLORIDE SERPL-SCNC: 100 MMOL/L (ref 98–107)
CREAT SERPL-MCNC: 0.63 MG/DL (ref 0.51–0.95)
DEPRECATED HCO3 PLAS-SCNC: 30 MMOL/L (ref 22–29)
EGFRCR SERPLBLD CKD-EPI 2021: >90 ML/MIN/1.73M2
GLUCOSE SERPL-MCNC: 87 MG/DL (ref 70–99)
HOLD SPECIMEN: NORMAL
POTASSIUM SERPL-SCNC: 3.4 MMOL/L (ref 3.4–5.3)
SODIUM SERPL-SCNC: 140 MMOL/L (ref 135–145)

## 2024-04-30 PROCEDURE — 120N000001 HC R&B MED SURG/OB

## 2024-04-30 PROCEDURE — 99232 SBSQ HOSP IP/OBS MODERATE 35: CPT | Performed by: STUDENT IN AN ORGANIZED HEALTH CARE EDUCATION/TRAINING PROGRAM

## 2024-04-30 PROCEDURE — 250N000013 HC RX MED GY IP 250 OP 250 PS 637: Performed by: STUDENT IN AN ORGANIZED HEALTH CARE EDUCATION/TRAINING PROGRAM

## 2024-04-30 PROCEDURE — 250N000013 HC RX MED GY IP 250 OP 250 PS 637

## 2024-04-30 PROCEDURE — 250N000013 HC RX MED GY IP 250 OP 250 PS 637: Performed by: INTERNAL MEDICINE

## 2024-04-30 PROCEDURE — 80048 BASIC METABOLIC PNL TOTAL CA: CPT | Performed by: INTERNAL MEDICINE

## 2024-04-30 PROCEDURE — 250N000011 HC RX IP 250 OP 636

## 2024-04-30 PROCEDURE — 36415 COLL VENOUS BLD VENIPUNCTURE: CPT | Performed by: INTERNAL MEDICINE

## 2024-04-30 RX ORDER — VANCOMYCIN HYDROCHLORIDE 125 MG/1
125 CAPSULE ORAL 4 TIMES DAILY
Status: DISCONTINUED | OUTPATIENT
Start: 2024-04-30 | End: 2024-05-01

## 2024-04-30 RX ADMIN — POTASSIUM CHLORIDE 20 MEQ: 1500 TABLET, EXTENDED RELEASE ORAL at 08:24

## 2024-04-30 RX ADMIN — SERTRALINE HYDROCHLORIDE 100 MG: 100 TABLET ORAL at 08:24

## 2024-04-30 RX ADMIN — VANCOMYCIN HYDROCHLORIDE 125 MG: 125 CAPSULE ORAL at 17:17

## 2024-04-30 RX ADMIN — MICONAZOLE NITRATE: 20 POWDER TOPICAL at 20:01

## 2024-04-30 RX ADMIN — VANCOMYCIN HYDROCHLORIDE 125 MG: 125 CAPSULE ORAL at 22:37

## 2024-04-30 RX ADMIN — BUMETANIDE 2 MG: 1 TABLET ORAL at 19:59

## 2024-04-30 RX ADMIN — ASPIRIN 81 MG: 81 TABLET, COATED ORAL at 08:23

## 2024-04-30 RX ADMIN — MICONAZOLE NITRATE: 20 POWDER TOPICAL at 08:24

## 2024-04-30 RX ADMIN — VANCOMYCIN HYDROCHLORIDE 125 MG: 125 CAPSULE ORAL at 12:29

## 2024-04-30 RX ADMIN — CEFTRIAXONE SODIUM 2 G: 2 INJECTION, POWDER, FOR SOLUTION INTRAMUSCULAR; INTRAVENOUS at 22:37

## 2024-04-30 RX ADMIN — POTASSIUM CHLORIDE 20 MEQ: 1500 TABLET, EXTENDED RELEASE ORAL at 19:59

## 2024-04-30 RX ADMIN — METOPROLOL SUCCINATE 25 MG: 25 TABLET, EXTENDED RELEASE ORAL at 08:24

## 2024-04-30 RX ADMIN — VANCOMYCIN HYDROCHLORIDE 125 MG: 125 CAPSULE ORAL at 08:23

## 2024-04-30 ASSESSMENT — ACTIVITIES OF DAILY LIVING (ADL)
ADLS_ACUITY_SCORE: 44
ADLS_ACUITY_SCORE: 46
ADLS_ACUITY_SCORE: 47
ADLS_ACUITY_SCORE: 44
ADLS_ACUITY_SCORE: 44
ADLS_ACUITY_SCORE: 46
ADLS_ACUITY_SCORE: 47
ADLS_ACUITY_SCORE: 47
ADLS_ACUITY_SCORE: 44
ADLS_ACUITY_SCORE: 46
ADLS_ACUITY_SCORE: 44
ADLS_ACUITY_SCORE: 46
ADLS_ACUITY_SCORE: 44
ADLS_ACUITY_SCORE: 46
ADLS_ACUITY_SCORE: 44
ADLS_ACUITY_SCORE: 47
ADLS_ACUITY_SCORE: 44
ADLS_ACUITY_SCORE: 46

## 2024-04-30 NOTE — PLAN OF CARE
Goal Outcome Evaluation:      Plan of Care Reviewed With: patient    Overall Patient Progress: no changeOverall Patient Progress: no change         Patient is alert and oriented.  Pt denying pain.  Has had 4 episodes of incontinent stools this shift.  IV saline locked.  Purewick in place due to urinary incontinence.  Continuing to encourage patient to advance activity and get out of to use bedside commode and sit in the chair but pt has remained reluctant and has been repositioning in the bed.  At baseline patient reports she mainly pivots from bed to wheelchair and to bedside commode and only takes a couple steps.  Was able to wean patient down to 3L via nasal cannula which is chronic for patient and has been able to maintain oxygen saturations.

## 2024-04-30 NOTE — PROGRESS NOTES
[facility]    Hospitalist Progress Note    Date of Service (when I saw the patient): 04/30/2024    Assessment & Plan   Bess Enamorado is a 48 year old female with who presents on 4/26/2024 with profuse watery diarrhea and was diagnosed with C. difficile colitis.     C. difficile colitis  C. difficile toxin positive in the emergency department.  No previous history of C. difficile infection.  Currently on cefdinir for left lower extremity cellulitis. Diarrhea with 4 stools overnight, slightly improved from prior.   - Vancomycin 125 mg by mouth 4 x daily (10 days total)    New cellulitis of abdominal wall  Intertrigo    Admitted 3/16-3/20 for LLE cellulitis due to staph lugdunensis. ID was consulted and she was initially tx with IV Meropenem, IV Vanc, IV Cefazolin, and then transitioned to IV Vanc. She was re-admitted 4/18-4/23 for LLE cellulitis and initially tx with IV Meropenem. Blood cultures on 4/18 grew Klebsiella oxytoca, strep gordonni, staph epi, so she was switched to IV Ceftriaxone. She was discharged on PO Cefdinir. CT abdomen pelvis with contrast lists similar pannus skin thickening and subcutaneous edema, correlate for cellulitis. Continues to improve.   - Continue ceftriaxone 2g IV daily as abdominal wall cellulitis continues to have significant erythema within skin folds (last dose 04/30)  - Blood cultures on 4/26/2024 NGTD  - Nystatin powder for intertrigo, which is likely the pathway of entry     Acute on chronic heart failure with preserved ejection fraction   Pulmonary hypertension, severe  Follows with Dr. Diaz,  cardiology. PTA Bumetanide increased to 4 mg BID (from 3mg daily).   - Continue metoprolol and potassium  - Give PO Bumex 2 mg twice daily   - Check BMP daily    Chronic respiratory failure with hypoxia without hypercapnia  On recent discharge she was sent home with 4L home oxygen continuously. Has since decreased to 3L. Arrived with mild SOB, but SpO2 stable  on 3L NC.   - Currently requiring 3 L, which is roughly her home O2 requirements  - Likely discharge home 5/01 if renal function remains stable with restarting PTA medications    Oxygen Documentation  I certify that this patient, Bess Enamorado has been under my care (or a nurse practitioner or physican's assistant working with me). This is the face-to-face encounter for oxygen medical necessity.      At the time of this encounter, I have reviewed the qualifying testing and have determined that supplemental oxygen is reasonable and necessary and is expected to improve the patient's condition in a home setting.         Patient has continued oxygen desaturation due to Chronic Respiratory Failure with Hypoxia J96.11.    If portability is ordered, is the patient mobile within the home? yes    Was this visit performed as a telehealth visit: No     Concern for cirrhosis  Probable congestive hepatitis  Noted incidentally on recent admission. LFTs WNL on admission, but Tbili 2.4 (baseline 1.5-5).  -CMP in am  -Follow-up with gastroenterology as an outpatient     Obesity hypoventilation syndrome   Morbid obesity   BMP >50. Hasn't started Zepbound yet.   -Continue home CPAP     History of pulmonary embolism  History of right upper lobe PE discovered Feb 2022 in setting of acute covid infection, treated with xarelto which was stopped after 3 months treatment by pulmonology. Not currently on anticoagulation.      Hx of NSTEMI  Managed prior to admission with Aspirin 81 mg, continue.     Multiple sclerosis   Immobile. Doesn't follow with neuro. Symptoms stable. Not on meds PTA.     Moderate episode of recurrent major depressive disorder   Generalized anxiety disorder  Managed prior to admission with Sertraline 100 mg, continue.    Clinically Significant Risk Factors Present on Admission           # Drug Induced Platelet Defect: home medication list includes an antiplatelet medication   # Hypertension: Noted on problem list  #  "Chronic heart failure with preserved ejection fraction: heart failure noted on problem list and last echo with EF >50%     # Severe Obesity: Estimated body mass index is 51.62 kg/m  as calculated from the following:    Height as of this encounter: 1.626 m (5' 4\").    Weight as of this encounter: 136.4 kg (300 lb 11.3 oz).          # Financial/Environmental Concerns:         Diet: Combination Diet 2 gm NA Diet    DVT Prophylaxis: Aspirin  Brar Catheter: Not present  Code Status: Full Code  Lines: peripheral iv    Disposition: Expected discharge in 1 day.     40 MINUTES SPENT BY ME on the date of service doing chart review, history, exam, documentation & further activities per the note.    Teja Leija, DO    Interval History   Patient reports only 4 loose stools overnight.  Appetite is good.  She denies abdominal pain, fever, or chills. Skin looks improved although continues to have induration. Planning to have her parents come down from [place] to stay with her tomorrow.     -Data reviewed today: I reviewed all new labs and imaging results over the last 24 hours. I personally reviewed no images or EKG's today.    Physical Exam   Temp: 97.2  F (36.2  C) Temp src: Oral BP: 119/83 Pulse: 77   Resp: 16 SpO2: 93 % O2 Device: Nasal cannula with humidification Oxygen Delivery: 3 LPM  Vitals:    04/28/24 0339 04/29/24 0500 04/30/24 0500   Weight: 141.6 kg (312 lb 2.7 oz) 141 kg (310 lb 13.6 oz) 137.8 kg (303 lb 12.7 oz)     Vital Signs with Ranges  Temp:  [97.2  F (36.2  C)-98.5  F (36.9  C)] 97.2  F (36.2  C)  Pulse:  [62-79] 77  Resp:  [16-18] 16  BP: (100-119)/(59-83) 119/83  SpO2:  [90 %-94 %] 93 %  I/O last 3 completed shifts:  In: 1080 [P.O.:1080]  Out: 3600 [Urine:3600]    Gen: Morbidly obese female, alert and oriented x 3, no acute distressed  HEENT: Atraumatic, normocephalic; sclera non-injected, anicterric; oral mucosa moist, no lesion, no exudate  Lungs: Clear to ausculation, no wheezes, no rhonchi, no rales, " comfortable on 3L  Heart: Regular rate, regular rhythm, no gallops, no rubs, no murmurs  GI: Bowel sound normal, no hepatosplenomegaly, no masses, non-tender, non-distended, no guarding, no rebound tenderness  Lymph: No lymphadenopathy, no edema  Skin: Mild erythema without tenderness involving the anterior pannus and folds.  Severe bilateral lower extremity chronic venous stasis.    Medications   Current Facility-Administered Medications   Medication Dose Route Frequency Provider Last Rate Last Admin     Current Facility-Administered Medications   Medication Dose Route Frequency Provider Last Rate Last Admin    aspirin EC tablet 81 mg  81 mg Oral Daily Russel Gonzalez PA-C   81 mg at 04/30/24 0823    [Held by provider] bumetanide (BUMEX) tablet 2 mg  2 mg Oral BID Agapito Urias MD   2 mg at 04/28/24 2229    cefTRIAXone (ROCEPHIN) 2 g vial to attach to  ml bag for ADULTS or NS 50 ml bag for PEDS  2 g Intravenous Q24H Russel Gonzalez PA-C 200 mL/hr at 04/29/24 2301 2 g at 04/29/24 2301    fiber modular (BANATROL TF) packet 1 packet  1 packet Oral TID Agapito Urias MD   1 packet at 04/30/24 1408    metoprolol succinate ER (TOPROL XL) 24 hr tablet 25 mg  25 mg Oral Daily Russel Gonzalez PA-C   25 mg at 04/30/24 0824    miconazole (MICATIN) 2 % powder   Topical BID Agapito Urias MD   Given at 04/30/24 0824    potassium chloride sheila ER (KLOR-CON M20) CR tablet 20 mEq  20 mEq Oral BID Agapito Urias MD   20 mEq at 04/30/24 0824    sertraline (ZOLOFT) tablet 100 mg  100 mg Oral Daily Russel Gonzalez PA-C   100 mg at 04/30/24 0824    vancomycin (VANCOCIN) capsule 125 mg  125 mg Oral 4x Daily Russel Gonzalez PA-C   125 mg at 04/30/24 1229       Data   Recent Labs   Lab 04/30/24  0606 04/28/24  0533 04/27/24  0736 04/26/24  1319   WBC  --  4.7 3.7* 4.6   HGB  --  13.9 14.3 15.7   MCV  --  90 88 86   PLT  --  171 151 184    138 138 137   POTASSIUM 3.4 3.4 3.8 4.3   CHLORIDE 100  101 103 100   CO2 30* 25 25 28   BUN 7.3 11.4 11.9 18.5   CR 0.63 0.72 0.70 0.70   ANIONGAP 10 12 10 9   EVITA 9.0 8.9 9.3 10.1*   GLC 87 82 89 84   ALBUMIN  --   --  3.2* 3.6   PROTTOTAL  --   --  7.0 8.3   BILITOTAL  --   --  1.4* 2.4*   ALKPHOS  --   --  63 73   ALT  --   --  13 15   AST  --   --  32 34       No results found for this or any previous visit (from the past 24 hour(s)).

## 2024-04-30 NOTE — PROGRESS NOTES
Patient is alert and orientated and will let her needs be known. She is up with assist of 2 to a pivot. SHe is wheel chair bound at baseline. She has had the periwick in at night with lots of output. She has been on the oxymask at 4liters overnight. She is on Nasal Cannula 3 liters on the day shift. She has a right PIV that is saline locked. She has had no bowel movements this shift. Her weight was 137.8kg.

## 2024-04-30 NOTE — PLAN OF CARE
Goal Outcome Evaluation:      Plan of Care Reviewed With: patient    Overall Patient Progress: improvingOverall Patient Progress: improving         Patient is alert and oriented.  Maintaining oxygen saturations greater than 90% on 3L via nasal cannula which is baseline for patient.  IV saline locked.  Patient up to the chair with 1 assist walker to pivot.  Using bedside commode.  Pt had one continent episode of stool this shift.  Denying pain.

## 2024-04-30 NOTE — TELEPHONE ENCOUNTER
Central Prior Authorization Team   Phone: [phone]    PA Initiation    Medication: Tirzepatide-Weight Management (ZEPBOUND) 2.5 MG/0.5ML prefilled pen  Insurance Company: [employer] - Phone [phone] Fax [phone]  Pharmacy Filling the Rx: [id number] [address] AT [facility] OF [address] [place], [place] [address]WAY  Filling Pharmacy Phone: [phone]  Filling Pharmacy Fax:    Start Date: 4/30/2024

## 2024-05-01 VITALS
SYSTOLIC BLOOD PRESSURE: 109 MMHG | BODY MASS INDEX: 50.02 KG/M2 | TEMPERATURE: 97.8 F | DIASTOLIC BLOOD PRESSURE: 72 MMHG | HEIGHT: 64 IN | HEART RATE: 71 BPM | OXYGEN SATURATION: 95 % | WEIGHT: 293 LBS | RESPIRATION RATE: 16 BRPM

## 2024-05-01 LAB
ANION GAP SERPL CALCULATED.3IONS-SCNC: 11 MMOL/L (ref 7–15)
BUN SERPL-MCNC: 8.9 MG/DL (ref 6–20)
CALCIUM SERPL-MCNC: 9.2 MG/DL (ref 8.6–10)
CHLORIDE SERPL-SCNC: 101 MMOL/L (ref 98–107)
CREAT SERPL-MCNC: 0.65 MG/DL (ref 0.51–0.95)
DEPRECATED HCO3 PLAS-SCNC: 27 MMOL/L (ref 22–29)
EGFRCR SERPLBLD CKD-EPI 2021: >90 ML/MIN/1.73M2
ERYTHROCYTE [DISTWIDTH] IN BLOOD BY AUTOMATED COUNT: 16.9 % (ref 10–15)
GLUCOSE SERPL-MCNC: 86 MG/DL (ref 70–99)
HCT VFR BLD AUTO: 48.9 % (ref 35–47)
HGB BLD-MCNC: 14.9 G/DL (ref 11.7–15.7)
MAGNESIUM SERPL-MCNC: 1.6 MG/DL (ref 1.7–2.3)
MCH RBC QN AUTO: 26.9 PG (ref 26.5–33)
MCHC RBC AUTO-ENTMCNC: 30.5 G/DL (ref 31.5–36.5)
MCV RBC AUTO: 88 FL (ref 78–100)
NT-PROBNP SERPL-MCNC: 1975 PG/ML (ref 0–450)
PLATELET # BLD AUTO: 162 10E3/UL (ref 150–450)
POTASSIUM SERPL-SCNC: 3.6 MMOL/L (ref 3.4–5.3)
RBC # BLD AUTO: 5.54 10E6/UL (ref 3.8–5.2)
SODIUM SERPL-SCNC: 139 MMOL/L (ref 135–145)
WBC # BLD AUTO: 4.1 10E3/UL (ref 4–11)

## 2024-05-01 PROCEDURE — 36415 COLL VENOUS BLD VENIPUNCTURE: CPT | Performed by: STUDENT IN AN ORGANIZED HEALTH CARE EDUCATION/TRAINING PROGRAM

## 2024-05-01 PROCEDURE — 80048 BASIC METABOLIC PNL TOTAL CA: CPT | Performed by: STUDENT IN AN ORGANIZED HEALTH CARE EDUCATION/TRAINING PROGRAM

## 2024-05-01 PROCEDURE — 99239 HOSP IP/OBS DSCHRG MGMT >30: CPT | Performed by: STUDENT IN AN ORGANIZED HEALTH CARE EDUCATION/TRAINING PROGRAM

## 2024-05-01 PROCEDURE — 83880 ASSAY OF NATRIURETIC PEPTIDE: CPT | Performed by: STUDENT IN AN ORGANIZED HEALTH CARE EDUCATION/TRAINING PROGRAM

## 2024-05-01 PROCEDURE — 250N000013 HC RX MED GY IP 250 OP 250 PS 637: Performed by: STUDENT IN AN ORGANIZED HEALTH CARE EDUCATION/TRAINING PROGRAM

## 2024-05-01 PROCEDURE — 85027 COMPLETE CBC AUTOMATED: CPT | Performed by: STUDENT IN AN ORGANIZED HEALTH CARE EDUCATION/TRAINING PROGRAM

## 2024-05-01 PROCEDURE — 83735 ASSAY OF MAGNESIUM: CPT | Performed by: STUDENT IN AN ORGANIZED HEALTH CARE EDUCATION/TRAINING PROGRAM

## 2024-05-01 PROCEDURE — 250N000013 HC RX MED GY IP 250 OP 250 PS 637: Performed by: INTERNAL MEDICINE

## 2024-05-01 PROCEDURE — 250N000013 HC RX MED GY IP 250 OP 250 PS 637

## 2024-05-01 RX ORDER — BUMETANIDE 1 MG/1
4 TABLET ORAL 2 TIMES DAILY
Status: DISCONTINUED | OUTPATIENT
Start: 2024-05-01 | End: 2024-05-01 | Stop reason: HOSPADM

## 2024-05-01 RX ORDER — SPIRONOLACTONE 25 MG/1
12.5 TABLET ORAL DAILY
Qty: 15 TABLET | Refills: 0 | Status: ON HOLD | OUTPATIENT
Start: 2024-05-01 | End: 2024-07-23

## 2024-05-01 RX ORDER — VANCOMYCIN HYDROCHLORIDE 125 MG/1
125 CAPSULE ORAL 4 TIMES DAILY
Qty: 20 CAPSULE | Refills: 0 | Status: SHIPPED | OUTPATIENT
Start: 2024-05-01 | End: 2024-05-06

## 2024-05-01 RX ORDER — SPIRONOLACTONE 25 MG
12.5 TABLET ORAL DAILY
Status: DISCONTINUED | OUTPATIENT
Start: 2024-05-01 | End: 2024-05-01 | Stop reason: HOSPADM

## 2024-05-01 RX ORDER — VANCOMYCIN HYDROCHLORIDE 125 MG/1
125 CAPSULE ORAL 4 TIMES DAILY
Status: DISCONTINUED | OUTPATIENT
Start: 2024-05-01 | End: 2024-05-01 | Stop reason: HOSPADM

## 2024-05-01 RX ADMIN — ASPIRIN 81 MG: 81 TABLET, COATED ORAL at 10:06

## 2024-05-01 RX ADMIN — VANCOMYCIN HYDROCHLORIDE 125 MG: 125 CAPSULE ORAL at 10:13

## 2024-05-01 RX ADMIN — MICONAZOLE NITRATE: 20 POWDER TOPICAL at 10:25

## 2024-05-01 RX ADMIN — EMPAGLIFLOZIN 10 MG: 10 TABLET, FILM COATED ORAL at 12:46

## 2024-05-01 RX ADMIN — SERTRALINE HYDROCHLORIDE 100 MG: 100 TABLET ORAL at 10:12

## 2024-05-01 RX ADMIN — BUMETANIDE 4 MG: 1 TABLET ORAL at 10:40

## 2024-05-01 RX ADMIN — VANCOMYCIN HYDROCHLORIDE 125 MG: 125 CAPSULE ORAL at 14:42

## 2024-05-01 RX ADMIN — METOPROLOL SUCCINATE 25 MG: 25 TABLET, EXTENDED RELEASE ORAL at 10:12

## 2024-05-01 RX ADMIN — POTASSIUM CHLORIDE 20 MEQ: 1500 TABLET, EXTENDED RELEASE ORAL at 10:11

## 2024-05-01 RX ADMIN — SPIRONOLACTONE 12.5 MG: 25 TABLET, FILM COATED ORAL at 12:46

## 2024-05-01 ASSESSMENT — ACTIVITIES OF DAILY LIVING (ADL)
ADLS_ACUITY_SCORE: 45
ADLS_ACUITY_SCORE: 49
ADLS_ACUITY_SCORE: 44
ADLS_ACUITY_SCORE: 49
ADLS_ACUITY_SCORE: 44
ADLS_ACUITY_SCORE: 49
ADLS_ACUITY_SCORE: 45
ADLS_ACUITY_SCORE: 44
ADLS_ACUITY_SCORE: 49
ADLS_ACUITY_SCORE: 44
ADLS_ACUITY_SCORE: 44

## 2024-05-01 NOTE — DISCHARGE SUMMARY
"[facility]  Hospitalist Discharge Summary      Date of Admission:  4/26/2024  Date of Discharge:  5/1/2024  Discharging Provider: Teja Leija DO  Discharge Service: Hospitalist Service    Discharge Diagnoses   C. difficile colitis  New cellulitis of abdominal wall  Intertrigo  Acute on chronic heart failure with preserved ejection fraction   Pulmonary hypertension, severe  Chronic respiratory failure with hypoxia without hypercapnia  Concern for cirrhosis  Probable congestive hepatitis  Obesity hypoventilation syndrome   Morbid obesity  History of pulmonary embolism  Hx of NSTEMI  Multiple sclerosis      Moderate episode of recurrent major depressive disorder   Generalized anxiety disorder      Clinically Significant Risk Factors     # Severe Obesity: Estimated body mass index is 52.3 kg/m  as calculated from the following:    Height as of this encounter: 1.626 m (5' 4\").    Weight as of this encounter: 138.2 kg (304 lb 10.8 oz).       Follow-ups Needed After Discharge   Follow-up Appointments     Follow-up and recommended labs and tests       Follow up with primary care provider, Mikala Luan, within 7 days for   hospital follow- up.  No follow up labs or test are needed.          Unresulted Labs Ordered in the Past 30 Days of this Admission       Date and Time Order Name Status Description    4/26/2024 10:54 PM Blood Culture Hand, Left Preliminary     4/26/2024 10:54 PM Blood Culture Hand, Left Preliminary         These results will be followed up by PCP.     Discharge Disposition   Discharged to home  Condition at discharge: Stable    Hospital Course   Bess Enamorado is a 48 year old female with who presents on 4/26/2024 with profuse watery diarrhea and was diagnosed with C. difficile colitis.     Acute on chronic heart failure with preserved ejection fraction   Pulmonary hypertension, severe  Follows with Dr. Diaz,  cardiology. PTA Bumetanide increased to 4 mg BID (from 3mg " Alfonso Fritz is a 60year old female presenting for   Chief Complaint   Patient presents with   â¢ Transitional Care Management     12/9-12/11  SOB   Â   ADMISSION DATE:  12/9/2018  DISCHARGE DATE:  12/11/2018  DISCHARGING PHYSICIAN:  Rebecca Mohan MD  ATTENDING PHYSICIAN:  Rebecca Mohan MD  Â   CODE STATUS:  Full Resuscitation  Â   DISCHARGE DIAGNOSIS:     1. Â Shortness breath with acute asthma exacerbation  2. Â Hypertension, hyperlipidemia  3. Â Type 2 diabetes  4. Â Hypothyroidism  5. Â Chronic kidney disease stage III  6. Â Anxiety/depression  7. Â Hyponatremia  8. Â Hypogammaglobulinemia              9.  Anemia  Denies Latex allergy or sensitivity. Medication verified and med list updated  Refills needed today: No    Health Maintenance Due   Topic Date Due   â¢ Diabetes Foot Exam  11/25/1974       Patient is up to date, no discussion needed. daily). H2PEF score with 95.7% probability of heart failure with preserved ejection fraction. Question whether impaired diastolic function is driving hypoxia and RV dysfunction. No previous PFT's or LHC/RHC on file for comparison. Relatively recent progression from room in December to 3-4 L NC now to maintain adequate saturation. Previously only on metoprolol and Bumex for GDMT. Previous RVSP +55 mmHg indicating severe pulmonary HTN, which given H2PEF score likely from left-sided diastolic dysfunction, however no previous investigation with pulmonary pressures or PFT's. Likely need CCB challenge to determine any component of type 1  - Initiated aldactone 12.5 mg and Jardiance 10 mg (started 05/01)  - Continue PTA metoprolol 25 mg daily and potassium 20 meq   - Continue PO Bumex 4 mg twice daily (increased from 3 mg daily)  - Continue supplemental oxygen to maintain SPO2 >90%, wean as tolerated      C. difficile colitis  C. difficile toxin positive in the emergency department.  No previous history of C. difficile infection.  Currently on cefdinir for left lower extremity cellulitis. Diarrhea with 4 stools overnight, slightly improved from prior.   - Vancomycin  mg by mouth 4 x daily (10 days total)    Cellulitis of abdominal wall - resolved   Intertrigo    Admitted 3/16-3/20 for LLE cellulitis due to staph lugdunensis. ID was consulted and she was initially tx with IV Meropenem, IV Vanc, IV Cefazolin, and then transitioned to IV Vanc. She was re-admitted 4/18-4/23 for LLE cellulitis and initially tx with IV Meropenem. Blood cultures on 4/18 grew Klebsiella oxytoca, strep gordonni, staph epi, so she was switched to IV Ceftriaxone. She was discharged on PO Cefdinir. CT abdomen pelvis with contrast lists similar pannus skin thickening and subcutaneous edema, correlate for cellulitis. Continues to improve.   - Finished 5 days of ceftriaxone for abdominal wall cellulitis, no continuation of PO antibiotics on  discharge  - Blood cultures on 4/26/2024 NGTD  - Nystatin powder for intertrigo, which is likely the pathway of entry    Chronic respiratory failure with hypoxia without hypercapnia  On recent discharge she was sent home with 4L home oxygen continuously. Has since decreased to 3L. Arrived with mild SOB, but SpO2 stable on 3L NC.   - Currently requiring 3 L, which is roughly her home O2 requirement    Oxygen Documentation  I certify that this patient, Bess Enamorado has been under my care (or a nurse practitioner or physican's assistant working with me). This is the face-to-face encounter for oxygen medical necessity.      At the time of this encounter, I have reviewed the qualifying testing and have determined that supplemental oxygen is reasonable and necessary and is expected to improve the patient's condition in a home setting.         Patient has continued oxygen desaturation due to Chronic Respiratory Failure with Hypoxia J96.11.    If portability is ordered, is the patient mobile within the home? yes    Was this visit performed as a telehealth visit: No     Concern for cirrhosis  Probable congestive hepatitis  Noted incidentally on recent admission. LFTs WNL on admission, but Tbili 2.4 (baseline 1.5-5).  -Follow-up PCP for a repeat CMP to evaluate for resolution of elevated Tbili when patient euvolemic     Obesity hypoventilation syndrome   Morbid obesity   BMP >50. Hasn't started Zepbound yet.   -Continue home CPAP     History of pulmonary embolism  History of right upper lobe PE discovered Feb 2022 in setting of acute covid infection, treated with xarelto which was stopped after 3 months treatment by pulmonology. Not currently on anticoagulation.      Hx of NSTEMI  Managed prior to admission with Aspirin 81 mg, continue.     Multiple sclerosis   Immobile. Doesn't follow with neuro. Symptoms stable. Not on meds PTA.  - Neurology referral placed, given progressive respiratory failure over last 3-4 months patient  will require further investigation to evaluate if there is any component of neuromuscular weakness secondary to MS driving her PH.      Moderate episode of recurrent major depressive disorder   Generalized anxiety disorder  Managed prior to admission with Sertraline 100 mg, continue.    Consultations This Hospital Stay   CARE MANAGEMENT / SOCIAL WORK IP CONSULT    Code Status   Full Code    Time Spent on this Encounter   ITeja DO, personally saw the patient today and spent greater than 30 minutes discharging this patient.       Teja Leija DO  [facility]  [address] [facility] [address]  Phone: [phone]  Fax: [phone]  ______________________________________________________________________    Physical Exam   Vital Signs: Temp: 97.8  F (36.6  C) Temp src: Oral BP: 103/64 Pulse: 70   Resp: 16 SpO2: 94 % O2 Device: Nasal cannula with humidification Oxygen Delivery: 3 LPM  Weight: 304 lbs 10.81 oz    Gen: Morbidly obese female, alert and oriented x 3, no acute distressed  HEENT: Atraumatic, normocephalic; sclera non-injected, anicterric; oral mucosa moist, no lesion, no exudate  Lungs: Clear to ausculation, no wheezes, no rhonchi, no rales, comfortable on 3L  Heart: Regular rate, regular rhythm, no gallops, no rubs, no murmurs  GI: Bowel sound normal, no hepatosplenomegaly, no masses, non-tender, non-distended, no guarding, no rebound tenderness  Lymph: No lymphadenopathy, no edema  Skin: Mild erythema without tenderness involving the anterior pannus and folds.  Severe bilateral lower extremity chronic venous stasis.       Primary Care Physician   Mikala Luna    Discharge Orders      Home Care Referral      Adult Pulmonary Medicine  Referral      Reason for your hospital stay    Acute on chronic respiratory failure with hypoxia, pulmonary hypertension, abdominal wall cellulitis, clostridium difficile colitis     Follow-up and recommended labs and  tests     Follow up with primary care provider, Mikala Luna, within 7 days for hospital follow- up.  No follow up labs or test are needed.     Activity    Your activity upon discharge: activity as tolerated     General PFT Lab (Please always keep checked)     Pulmonary Function Test    .     Oxygen Adult/Peds    Oxygen Documentation  I certify that this patient, Bess Enamorado has been under my care (or a nurse practitioner or physican's assistant working with me). This is the face-to-face encounter for oxygen medical necessity.      At the time of this encounter, I have reviewed the qualifying testing and have determined that supplemental oxygen is reasonable and necessary and is expected to improve the patient's condition in a home setting.         Patient has continued oxygen desaturation due to Chronic Respiratory Failure with Hypoxia J96.11.    If portability is ordered, is the patient mobile within the home? yes    Was this visit performed as a telehealth visit: No     Diet    Follow this diet upon discharge: Combination Diet 2 gm NA Diet       Significant Results and Procedures   Most Recent 3 CBC's:  Recent Labs   Lab Test 05/01/24  0637 04/28/24  0533 04/27/24  0736   WBC 4.1 4.7 3.7*   HGB 14.9 13.9 14.3   MCV 88 90 88    171 151     Most Recent 3 BMP's:  Recent Labs   Lab Test 05/01/24  0637 04/30/24  0606 04/28/24  0533    140 138   POTASSIUM 3.6 3.4 3.4   CHLORIDE 101 100 101   CO2 27 30* 25   BUN 8.9 7.3 11.4   CR 0.65 0.63 0.72   ANIONGAP 11 10 12   EVITA 9.2 9.0 8.9   GLC 86 87 82   ,   Results for orders placed or performed during the hospital encounter of 04/26/24   CT Chest (PE) Abdomen Pelvis w Contrast    Narrative    CT CHEST PE ABDOMEN AND PELVIS WITH CONTRAST 4/26/2024 3:35 PM    CLINICAL HISTORY: Short of air, recent hospitalization for cellulitis  with sepsis, history of PE, diarrhea abdominal discomfort.    TECHNIQUE: CT scan of the chest, abdomen, and pelvis was  performed  following injection of IV contrast. Multiplanar reformats were  obtained. Dose reduction techniques were used.     CONTRAST: 100mL Isovue-370    COMPARISON: CT PE 4/18/2024. CT abdomen and pelvis 3/16/2024     FINDINGS:   ANGIOGRAM CHEST: Similar dilated main pulmonary arteries. No  convincing acute pulmonary embolus. Similar dilated right ventricle.  Thoracic aorta is nonaneurysmal.     LOWER NECK: Unremarkable.    LUNGS AND PLEURA: No focal consolidation or pleural effusion. Similar  mosaic attenuation of the lung parenchyma. Calcified granulomas.    AIRWAYS: Patent.    HEART: No pericardial effusion. No coronary calcifications. Mitral  valve annular calcification.    MEDIASTINUM AND DIPTI: Similar mildly enlarged upper mediastinal (6/5),  precarinal and left axillary lymph nodes measuring up to 1.3 cm  (6/23).    HEPATOBILIARY: Similar heterogeneous enhancement and lobular contour  of the liver. No suspicious mass. Cholecystectomy.    SPLEEN: Unremarkable.    PANCREAS: Unremarkable.    ADRENAL GLANDS: Unremarkable.    KIDNEYS/URETERS/BLADDER: Nonobstructing right renal calculus.    BOWEL: No bowel dilatation or wall thickening.    LYMPH NODES AND PERITONEUM: Similar scattered prominent  retroperitoneal and pelvic lymph nodes.     VASCULATURE: Patent recannulized paraumbilical vein.    PELVIS: Similar in size mobile right adnexal dermoid measuring 9.2 cm.  No new surrounding inflammation.    MUSCULOSKELETAL: Similar lower abdominal wall thickening and  subcutaneous edema. No fluid collection. No aggressive osseous lesion.  Degenerative changes of the spine. Remote left rib fractures.    ADDITIONAL FINDINGS: None.      Impression    IMPRESSION:  1.  No convincing acute pulmonary embolus or process within the chest.  2.  Similar pannus skin thickening and subcutaneous edema. Correlate  for cellulitis.  3.  Similar dilated main pulmonary artery and right ventricle  suggestive of pulmonary arterial  hypertension.  4.  Morphologic features of cirrhosis, possibly cardiac related.  5.  Similar mildly enlarged mediastinal and left axillary lymph nodes,  possibly reactive. Attention on follow-up.  6.  Similar right adnexal mobile dermoid.    BRODY CHUNG MD         SYSTEM ID:  [id number]       Discharge Medications   Current Discharge Medication List        START taking these medications    Details   empagliflozin (JARDIANCE) 10 MG TABS tablet Take 1 tablet (10 mg) by mouth daily  Qty: 90 tablet, Refills: 1    Associated Diagnoses: Acute on chronic congestive heart failure, unspecified heart failure type (H)      spironolactone (ALDACTONE) 25 MG tablet Take 0.5 tablets (12.5 mg) by mouth daily for 30 days  Qty: 15 tablet, Refills: 0    Associated Diagnoses: Acute on chronic congestive heart failure, unspecified heart failure type (H)      vancomycin (VANCOCIN) 125 MG capsule Take 1 capsule (125 mg) by mouth 4 times daily for 5 days  Qty: 20 capsule, Refills: 0    Associated Diagnoses: Colitis due to Clostridium difficile           CONTINUE these medications which have NOT CHANGED    Details   acetaminophen (TYLENOL) 650 MG CR tablet Take 650 mg by mouth every 8 hours as needed for mild pain or fever      aspirin (ASPIRIN LOW DOSE) 81 MG EC tablet Take 1 tablet (81 mg) by mouth daily    Associated Diagnoses: Chronic heart failure with preserved ejection fraction (H)      bumetanide (BUMEX) 2 MG tablet Take 2 tablets (4 mg) by mouth 2 times daily  Qty: 120 tablet, Refills: 2    Associated Diagnoses: Chronic heart failure with preserved ejection fraction (H)      Emollient (GOLD BOND MEDICATED BODY EX) Externally apply 1 Application topically 2 times daily as needed      metoprolol succinate ER (TOPROL XL) 25 MG 24 hr tablet Take 1 tablet (25 mg) by mouth daily  Qty: 90 tablet, Refills: 3    Associated Diagnoses: Benign essential hypertension; Sinus tachycardia      miconazole (MICATIN) 2 % AERP powder Apply  topically 2 times daily as needed  Qty: 128 g, Refills: 3    Associated Diagnoses: Intertrigo      potassium chloride sheila ER (KLOR-CON M20) 20 MEQ CR tablet Take 1 tablet (20 mEq) by mouth 2 times daily  Qty: 180 tablet, Refills: 3    Associated Diagnoses: Chronic heart failure with preserved ejection fraction (H)      sertraline (ZOLOFT) 100 MG tablet Take 1 tablet (100 mg) by mouth daily  Qty: 90 tablet, Refills: 3    Associated Diagnoses: Moderate episode of recurrent major depressive disorder (H); Generalized anxiety disorder      tirzepatide-Weight Management (ZEPBOUND) 2.5 MG/0.5ML prefilled pen Inject 0.5 mLs (2.5 mg) Subcutaneous every 7 days  Qty: 2 mL, Refills: 0    Associated Diagnoses: Class 3 severe obesity due to excess calories with serious comorbidity and body mass index (BMI) of 50.0 to 59.9 in adult (H)           STOP taking these medications       cefdinir (OMNICEF) 300 MG capsule Comments:   Reason for Stopping:             Allergies   Allergies   Allergen Reactions    Nkda [No Known Drug Allergy]

## 2024-05-01 NOTE — PROGRESS NOTES
"Patient did verify with writer that she is able to ambulate with walker \"just not long distances.\" Patient does pivot from WHEELCHAIR to BEDSIDE COMMODE, however, patient can ambulate to BATHROOM.  Patient had a complete bed change D/T purewick leaking all over bed.  IV SL. No loose stools reported to writer this shift.  "

## 2024-05-01 NOTE — PROGRESS NOTES
[facility] DISCHARGE NOTE    Patient discharged to home at 3:40 PM via wheel chair. Accompanied by transport staff and staff. Discharge instructions reviewed with patient, opportunity offered to ask questions. Prescriptions sent to patients preferred pharmacy. All belongings sent with patient.    Daylin Ardon RN

## 2024-05-01 NOTE — TELEPHONE ENCOUNTER
Josias Rust  [facility] Pool1 hour ago (8:15 AM)     LW  Per insurance, medication is excluded from patient's benefit plan and will not be covered. Review and appeal are not available because of this exclusion.

## 2024-05-01 NOTE — TELEPHONE ENCOUNTER
PRIOR AUTHORIZATION DENIED    Medication: Tirzepatide-Weight Management (ZEPBOUND) 2.5 MG/0.5ML prefilled pen    Denial Date: 5/1/2024    Denial Rational:  Per insurance, medication is excluded from patient's benefit plan and will not be covered. Review and appeal are not available because of this exclusion.          Appeal Information:  N/A

## 2024-05-01 NOTE — PROGRESS NOTES
Care Management Discharge Note    Discharge Date: 05/01/2024     Discharge Disposition: Home Care    Discharge Services: None    Discharge DME: None    Discharge Transportation: family or friend will provide    Private pay costs discussed: transportation costs    Does the patient's insurance plan have a 3 day qualifying hospital stay waiver?  No    Patient/family educated on Medicare website which has current facility and service quality ratings: no    Education Provided on the Discharge Plan: Yes  Persons Notified of Discharge Plans: Patient  Patient/Family in Agreement with the Plan: yes    Handoff Referral Completed: Yes    Additional Information:    Per IDT rounds, MD team states that patient is medically stable for discharge today.     Patient will discharge home with family support and [facility] (Phone: [phone]) PT/OT/RN/HHA services. Patient is in agreement with discharge plan and request that  arrange transport.     PLAN: Home with HC- PT/OT/RN/HHA  Transport: [facility] w/c around 1547    IVÁN MULLIGAN RN

## 2024-05-02 ENCOUNTER — PATIENT OUTREACH (OUTPATIENT)
Dept: CARE COORDINATION | Facility: CLINIC | Age: 49
End: 2024-05-02

## 2024-05-02 LAB
BACTERIA BLD CULT: NO GROWTH
BACTERIA BLD CULT: NO GROWTH

## 2024-05-02 ASSESSMENT — ACTIVITIES OF DAILY LIVING (ADL): DEPENDENT_IADLS:: INDEPENDENT;TRANSPORTATION

## 2024-05-02 NOTE — PROGRESS NOTES
Anesthesia Pre Eval Note    Anesthesia ROS/Med Hx        Additional Results:     ALLERGIES:  No Known Allergies       Lab Results       Component                Value               Date                       WBC                      7.5                 11/08/2021                 RBC                      2.69 (L)            11/08/2021                 HGB                      7.8 (L)             11/08/2021                 HCT                      25.4 (L)            11/08/2021                 MCHC                     30.7 (L)            11/08/2021                 SODIUM                   142                 11/08/2021                 POTASSIUM                4.2                 11/08/2021                 POTASSIUM                4.2                 11/08/2021                 CHLORIDE                 113 (H)             11/08/2021                 CO2                      24                  11/08/2021                 GLUCOSE                  175 (H)             11/08/2021                 BUN                      18                  11/08/2021                 CREATININE               0.80                11/08/2021                 GFRESTIMATE              81                  11/08/2021                 CALCIUM                  8.1 (L)             11/08/2021                 PLT                      262                 11/08/2021                 PTT                      26                  11/08/2021                 INR                      1.1                 11/08/2021             Past Medical History:  No date: Anemia, due to inadequate iron intake  No date: Ascites  No date: Benign neoplasm of ascending colon  No date: Chronic kidney disease  No date: Diverticulosis  No date: Epigastric pain  No date: GERD (gastroesophageal reflux disease)  No date: Gilbert syndrome  No date: Hernia, umbilical  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  No date: IBS (irritable bowel syndrome)    Past Surgical History:  No  Clinic Care Coordination Contact  Clinic Care Coordination Contact  OUTREACH    Referral Information:  Referral Source: IP Handoff    Primary Diagnosis: Other (include Comment box) (Left lower leg cellulitis)    Chief Complaint   Patient presents with    Clinic Care Coordination - Post Hospital     Clinic care Coordination RN         Universal Utilization:   [facility]  Hospitalist Discharge Summary       Date of Admission:  4/26/2024  Date of Discharge:  5/1/2024  Discharging Provider: Teja Leija DO  Discharge Service: Hospitalist Service        Discharge Diagnoses  C. difficile colitis  New cellulitis of abdominal wall  Intertrigo  Acute on chronic heart failure with preserved ejection fraction   Pulmonary hypertension, severe  Chronic respiratory failure with hypoxia without hypercapnia  Concern for cirrhosis  Probable congestive hepatitis  Obesity hypoventilation syndrome   Morbid obesity  History of pulmonary embolism  Hx of NSTEMI  Multiple sclerosis      Moderate episode of recurrent major depressive disorder   Generalized anxiety disorder    Clinic Utilization  Difficulty keeping appointments:: No  Compliance Concerns: No  No-Show Concerns: No  No PCP office visit in Past Year: No  Utilization      No Show Count (past year)  0             ED Visits  4             Hospital Admissions  4                    Current as of: 5/2/2024  8:44 AM                Clinical Concerns:  Current Medical Concerns:  Patient report only a few stools last night.  Patient states the abdominal and leg cellulitis is looking good.  Patient has a hospital follow up 5/8/2024  Patient is resuming [facility] services     Current Behavioral Concerns: No    Education Provided to patient: CC role introduced    Pain  Pain (GOAL):: No  Health Maintenance Reviewed: Not assessed  Clinical Pathway: None    Medication Management:  Medication review status: Medications reviewed.  Changes noted per patient report.        Functional Status:  Dependent ADLs:: Ambulation-walker, Bathing  Dependent IADLs:: Independent, Transportation  Mobility Status: Independent w/Device    Living Situation:  Current living arrangement:: I live alone, I live in a private home    Lifestyle & Psychosocial Needs:    Social Determinants of Health     Food Insecurity: Low Risk  (3/21/2024)    Food Insecurity     Within the past 12 months, did you worry that your food would run out before you got money to buy more?: No     Within the past 12 months, did the food you bought just not last and you didn t have money to get more?: No   Depression: Not at risk (2/7/2024)    PHQ-2     PHQ-2 Score: 2   Housing Stability: Low Risk  (3/21/2024)    Housing Stability     Do you have housing? : Yes     Are you worried about losing your housing?: No   Tobacco Use: Low Risk  (4/17/2024)    Patient History     Smoking Tobacco Use: Never     Smokeless Tobacco Use: Never     Passive Exposure: Not on file   Financial Resource Strain: Low Risk  (9/25/2023)    Financial Resource Strain     Within the past 12 months, have you or your family members you live with been unable to get utilities (heat, electricity) when it was really needed?: No   Alcohol Use: Not on file   Transportation Needs: Low Risk  (3/21/2024)    Transportation Needs     Within the past 12 months, has lack of transportation kept you from medical appointments, getting your medicines, non-medical meetings or appointments, work, or from getting things that you need?: No   Physical Activity: Inactive (3/21/2024)    Exercise Vital Sign     Days of Exercise per Week: 0 days     Minutes of Exercise per Session: 0 min   Interpersonal Safety: Low Risk  (9/27/2023)    Interpersonal Safety     Do you feel physically and emotionally safe where you currently live?: Yes     Within the past 12 months, have you been hit, slapped, kicked or otherwise physically hurt by someone?: No     Within the past 12 months, have you  date: Appendectomy  2004: Cabg, artery-vein, two  No date: Cataract extraction, bilateral  No date: Hernia repair  No date: Splenectomy, partial       Prior to Admission medications :  Medication valACYclovir (VALTREX) 500 MG tablet, Sig Take 1 tablet by mouth every 12 hours., Start Date 10/24/21, End Date , Taking? , Authorizing Provider Aayush Manrique MD    Medication allopurinol (ZYLOPRIM) 100 MG tablet, Sig Take 1 tablet by mouth 2 times daily., Start Date 10/24/21, End Date , Taking? , Authorizing Provider Aayush Manrique MD    Medication sulfamethoxazole-trimethoprim (BACTRIM DS) 800-160 MG per tablet, Sig Take 1 tablet by mouth 3 days a week. Monday/wednesday/Friday, Start Date 10/25/21, End Date , Taking? , Authorizing Provider Aayush Manrique MD    Medication sucralfate (CARAFATE) 1 GM/10ML suspension, Sig Take 10 mLs by mouth every 8 hours., Start Date 9/28/21, End Date , Taking? , Authorizing Provider Jackie Dover MD    Medication spironolactone (ALDACTONE) 25 MG tablet, Sig Take 1 tablet by mouth daily. Do not start before September 29, 2021., Start Date 9/29/21, End Date , Taking? , Authorizing Provider Jackie Dover MD    Medication propranolol (INDERAL) 10 MG tablet, Sig Take 1 tablet by mouth 3 times daily., Start Date 9/28/21, End Date , Taking? , Authorizing Provider Jackie Dover MD    Medication furosemide (LASIX) 20 MG tablet, Sig Take 1 tablet by mouth daily. Do not start before September 29, 2021., Start Date 9/29/21, End Date , Taking? , Authorizing Provider Jackie Dover MD    Medication atorvastatin (LIPITOR) 40 MG tablet, Sig Take 1 tablet by mouth nightly., Start Date 9/28/21, End Date , Taking? , Authorizing Provider Jackie Dover MD    Medication digoxin (LANOXIN) 0.125 MG tablet, Sig Take 1 tablet by mouth daily. Do not start before September 29, 2021., Start Date 9/29/21, End Date , Taking? , Authorizing Provider Jackie Dover MD    Medication empagliflozin (JARDIANCE) 10 MG tablet,  Sig 10 mg daily (before breakfast)., Start Date , End Date , Taking? , Authorizing Provider Outside Provider    Medication apixaBAN (Eliquis) 5 MG Tab, Sig Take 5 mg by mouth every 12 hours., Start Date , End Date , Taking? , Authorizing Provider Outside Provider    Medication metformin (GLUCOPHAGE) 1000 MG tablet, Sig Take 1,000 mg by mouth 2 times daily (with meals)., Start Date , End Date , Taking? , Authorizing Provider Outside Provider    Medication montelukast (SINGULAIR) 10 MG tablet, Sig Take 10 mg by mouth nightly., Start Date , End Date , Taking? , Authorizing Provider Outside Provider    Medication niacin 1000 MG extended-release tablet, Sig Take 1,000 mg by mouth., Start Date , End Date , Taking? , Authorizing Provider Outside Provider    Medication pantoprazole (PROTONIX) 40 MG/20ML (compounded) suspension, Sig Take 40 mg by mouth. Delayed released tab, Start Date , End Date , Taking? , Authorizing Provider Outside Provider    Medication tamsulosin (FLOMAX) 0.4 MG Cap, Sig Take 0.4 mg by mouth., Start Date , End Date , Taking? , Authorizing Provider Outside Provider    Medication venlafaxine XR (EFFEXOR XR) 75 MG 24 hr capsule, Sig Take 75 mg by mouth daily., Start Date , End Date , Taking? , Authorizing Provider Outside Provider         Patient Vitals in the past 24 hrs:  11/08/21 1346, BP:111/79, Temp:36.8 °C (98.2 °F), Temp src:Axillary, Pulse:77, Resp:20, SpO2:99 %  11/08/21 1327, BP:118/82, Temp:36.8 °C (98.2 °F), Temp src:Oral, Pulse:75, Resp:18, SpO2:100 %  11/08/21 0939, BP:111/74, Pulse:98, Resp:16, SpO2:98 %  11/08/21 0833, Pulse:76  11/08/21 0425, BP:118/71, Temp:36.3 °C (97.3 °F), Temp src:Axillary, Pulse:62, Resp:16, SpO2:98 %  11/07/21 1940, BP:115/70, Temp:36.4 °C (97.5 °F), Temp src:Oral, Pulse:65, Resp:16, SpO2:98 %  11/07/21 1915, Pulse:69      Relevant Problems   No relevant active problems       Physical Exam     Airway   Mallampati: II    Cardiovascular    Cardiovascular Note: No  been humiliated or emotionally abused in other ways by your partner or ex-partner?: No   Stress: No Stress Concern Present (12/4/2020)    [employer] [place] [employer] - Occupational Stress Questionnaire     Feeling of Stress : Only a little   Social Connections: Unknown (3/1/2022)    Social Connection and Isolation Panel [NHANES]     Frequency of Communication with Friends and Family: Twice a week     Frequency of Social Gatherings with Friends and Family: Twice a week     Attends Mormonism Services: Not on file     Active Member of Clubs or Organizations: Not on file     Attends Club or Organization Meetings: Not on file     Marital Status: Not on file   Health Literacy: Not on file     Inadequate nutrition (GOAL):: No  Tube Feeding: No  Inadequate activity/exercise (GOAL):: No  Significant changes in sleep pattern (GOAL): No  Transportation means:: Family     Mormonism or spiritual beliefs that impact treatment:: No  Mental health DX:: Yes  Mental health DX how managed:: Medication  Mental health management concern (GOAL):: No  Chemical Dependency Status: No Current Concerns  Informal Support system:: Friends, Family             Resources and Interventions:  Current Resources:   Skilled Home Care Services: Skilled Nursing, Home Health Aid, Physical Therapy ([facility])  Community Resources: Home Care  Supplies Currently Used at Home: Incontinence Supplies, Oxygen Tubing/Supplies (CPAP)  Equipment Currently Used at Home: grab bar, tub/shower, shower chair, walker, rolling, wheelchair, manual, commode chair, other (see comments) (home O2, CPAP)  Employment Status: disabled         Advance Care Plan/Directive  Advanced Care Plans/Directives on file:: No    Referrals Placed: None      Patient/Caregiver understanding: Patient expresses good understanding of discharge instructions        Future Appointments                In 6 days Jacqui Palomares DO [facility]    In 1  cardiac distress    General Assessment  General Assessment: Alert and oriented and No acute distress    Dental Exam  Dental exam normal    Pulmonary Exam    Pulmonary Note: Breathing without distress        Anesthesia Plan:    ASA Status: 4  Anesthesia Type: MAC    Induction: Intravenous  Preferred Airway Type: Nasal Cannula  Premedication: None    Checklist  Reviewed: Past Med History  Consent/Risks Discussed Statement:  The proposed anesthetic plan, including its risks and benefits, have been discussed with the Patient along with the risks and benefits of alternatives. Questions were encouraged and answered and the patient and/or representative understands and agrees to proceed.        I discussed with the patient (and/or patient's legal representative) the risks and benefits of the proposed anesthesia plan, MAC, which may include services performed by other anesthesia providers.    Alternative anesthesia plans, if available, were reviewed with the patient (and/or patient's legal representative). Discussion has been held with the patient (and/or patient's legal representative) regarding risks of anesthesia, which include Nausea, Vomiting, Allergic Reaction and Intra-operative Awareness and emergent situations that may require change in anesthesia plan.    The patient (and/or patient's legal representative) has indicated understanding, his/her questions have been answered, and he/she wishes to proceed with the planned anesthetic.       month Christin Holt APRN CNP [facility] [place], [facility]    In 2 months Cyndie Joyner MD [facility] [place], [facility]            Plan:   Patient will resume [facility] services   No unmet needs, no further care coordination is needed at this time     [facility]   Meenakshi Campbell RN, Care Coordinator   [facility]'s   E-mail [email or web address]   [phone]

## 2024-05-03 ENCOUNTER — TELEPHONE (OUTPATIENT)
Dept: FAMILY MEDICINE | Facility: CLINIC | Age: 49
End: 2024-05-03
Payer: MEDICARE

## 2024-05-03 NOTE — TELEPHONE ENCOUNTER
Home Care is calling regarding an established patient with [facility].     Requesting orders from: Mikala Luna  Provider is following patient: Yes  Is this a 60-day recertification request?  No    Orders Requested    Skilled Nursing  Request for resumption in care.   Patient was discharge from the hospital on 5/1. This is her second hospital stay in 2 weeks. She had a reaction to her antibiotic. She will be seen for c diff, pain and respiratory status assessments. RN would like to increase her visits to   2 x/week for 2 weeks then 1x/week for 6 weeks.    Nurse is asking for this to be sent high priority as would like to see patient 5/6 or 5/7.    Information was gathered and will be sent to provider for review.  RN will contact Home Care with information after provider review.  Confirmed ok to leave a detailed message with call back.  Contact information confirmed and updated as needed.    Will route to Dr. Luna and care team.  Kristy Garcia RN

## 2024-05-03 NOTE — TELEPHONE ENCOUNTER
Left voice mail for Key with verbal okay for the orders requested below.     Marce Wilcox RN on 5/3/2024 at 3:14 PM

## 2024-05-06 ENCOUNTER — TELEPHONE (OUTPATIENT)
Dept: FAMILY MEDICINE | Facility: CLINIC | Age: 49
End: 2024-05-06

## 2024-05-06 NOTE — TELEPHONE ENCOUNTER
Resumption of care  done recently    [facility] would like to  Wean oxygen for sats >90%     Pt had recent coxxyx pressure ulcer and they are using mepilex foam boarder dressing, has area also behind knee, not able to get good dressing to stay    Wanted to cleanse and place inter dry cloth      Verbal orders needed  Peggy Mae RN on 5/6/2024 at 3:02 PM

## 2024-05-07 NOTE — TELEPHONE ENCOUNTER
Approval of requested orders left on identified HC KINDRA Mackey's confidential voicemail, directed to call back with any further questions or needs    Kae Rodriguez MSN, RN

## 2024-05-08 ENCOUNTER — MEDICAL CORRESPONDENCE (OUTPATIENT)
Dept: HEALTH INFORMATION MANAGEMENT | Facility: CLINIC | Age: 49
End: 2024-05-08

## 2024-05-08 NOTE — CONFIDENTIAL NOTE
DIAGNOSIS:  Cirrhosis of liver without ascites, unspecified hepatic cirrhosis type (H)    Appt Date:  07.05.2024    NOTES STATUS DETAILS   OFFICE NOTE from referring provider Internal 04.18.2024 Agapito Urias MD    OFFICE NOTES from other specialists     DISCHARGE SUMMARY from hospital Internal 04.18.2024 Agapito Urias MD    MEDICATION LIST Internal    LIVER BIOSPY (IF APPLICABLE)      PATHOLOGY REPORTS      IMAGING     ENDOSCOPY (IF AVAILABLE)     COLONOSCOPY (IF AVAILABLE)     ULTRASOUND LIVER     CT OF ABDOMEN Internal 03.16.2024, 12.21.2023  CT Abd Pelvis   MRI OF LIVER     FIBROSCAN, US ELASTOGRAPHY, FIBROSIS SCAN, MR ELASTOGRAPHY     LABS     HEPATIC PANEL (LIVER PANEL) Internal 03.25.2024    BASIC METABOLIC PANEL Internal 05.01.2024   COMPLETE METABOLIC PANEL Internal 05.01.2024   COMPLETE BLOOD COUNT (CBC) Internal 05.01.2024    INTERNATIONAL NORMALIZED RATIO (INR) Internal 04.22.2024    HEPATITIS C ANTIBODY Internal 04.18.2024   HEPATITIS C VIRAL LOAD/PCR     HEPATITIS C GENOTYPE     HEPATITIS B SURFACE ANTIGEN Internal 04.18.2024   HEPATITIS B SURFACE ANTIBODY Internal 04.18.2024   HEPATITIS B DNA QUANT LEVEL     HEPATITIS B CORE ANTIBODY Internal 04.18.2024

## 2024-05-13 ENCOUNTER — TELEPHONE (OUTPATIENT)
Dept: FAMILY MEDICINE | Facility: CLINIC | Age: 49
End: 2024-05-13
Payer: MEDICARE

## 2024-05-13 NOTE — TELEPHONE ENCOUNTER
Home Care is calling regarding an established patient with [facility].       Requesting orders from: Mikala Luna  Provider is following patient: Yes  Is this a 60-day recertification request?  No    Orders Requested    Physical Therapy  Request for initial certification (first set of orders)   Frequency:  1x/wk for 5 wks    Confirmed ok to leave a detailed message with call back.  Contact information confirmed and updated as needed.    Marce Story RN

## 2024-05-17 ENCOUNTER — TELEPHONE (OUTPATIENT)
Dept: FAMILY MEDICINE | Facility: CLINIC | Age: 49
End: 2024-05-17

## 2024-05-17 NOTE — TELEPHONE ENCOUNTER
Home Health Care    Reason for call:  Key with [facility] care calling stating they are cancelling todays visit due to patient having another appointment. [place].     Pedro Rodriguez, ABDULAZIZ Daley

## 2024-05-22 ENCOUNTER — DOCUMENTATION ONLY (OUTPATIENT)
Dept: CARDIOLOGY | Facility: CLINIC | Age: 49
End: 2024-05-22
Payer: MEDICARE

## 2024-05-22 NOTE — PROGRESS NOTES
"5- I prev received a message on 5- from [facility] asking about the pts order for infusion, Dr Diaz pt, and order / Note from Dr Diaz dated 4- says-  Encounter Date: 4/4/2024       Subcutaneous Lasix can be used instead of metolazone if needed if she does not make good headway in the next couple of weeks or labs are abnormal.  Please alert with me with labs and see how she is feeling in the next 2 weeks.        I see pt has been in and out of the Hospital since that note.I had sent  a request to Team 7 asking for them to advise re her [facility] Order. Team 7 RN asked me to  send a message to the [place] staff to address w/ Dr Diaz.   I sent the message on Wed 5-.    [facility] called me again today 5- asking for Order clarification. I will re send to the [place] Team to address. I think they just need Dr Diaz to give a \"hold\" order, as we have not seen her post hospital admissions.  Sondra Duarte lpn  "

## 2024-05-22 NOTE — PROGRESS NOTES
I spoke w/ Rep Nikolai @ [facility], did let him know we would address her ongoing need for infusion @ the upcoming visit scheduled 6-.  [facility] called @ [phone]. Ref# [id number]. Her co-pay is  $4.67  Sondra Duarte Lpn

## 2024-05-22 NOTE — PROGRESS NOTES
"5- see [place] RN note below re \"hold\" on infusion for Now  *  I spoke w/ Rep Nikolai @ [facility], did let him know we would address her ongoing need for infusion @ the upcoming visit scheduled 6-.  [facility] called @ [phone]. Ref# [id number]. Her co-pay is  $4.67  Sondra Duarte Lpn     "

## 2024-05-22 NOTE — PROGRESS NOTES
attempted to call pt again today, no answer.     Please place on hold for now and will have Christin Holt CNP readdress with pt at upcoming visit on 6/11/24.     Heather Morris RN

## 2024-05-22 NOTE — PROGRESS NOTES
Have attempted to call pt several times with no answer and messages left to get an update on her fluid status to see if she needs Furo[facility]x.     She has CORE visit on 6/11/24 with Christin Pierre CNP as well.     Heather Morris RN

## 2024-05-30 ENCOUNTER — TELEPHONE (OUTPATIENT)
Dept: FAMILY MEDICINE | Facility: CLINIC | Age: 49
End: 2024-05-30
Payer: MEDICARE

## 2024-05-30 NOTE — TELEPHONE ENCOUNTER
Dr. Luna:    Received a call from Argelia from [facility] (call back is [phone]).  Home Care is calling regarding an established patient with [facility].       Requesting orders from: Mikala Luna  Provider is following patient: Yes  Is this a 60-day recertification request?  No    Orders Requested    Need verbal order to cancel the Skilled Nursing Visit this week as home care is prepping patient for discharge, home care will then go out the following week to complete discharge.      Confirmed ok to leave a detailed message with call back.  Contact information confirmed and updated as needed.      Please advise orders.        Eleanor Rodriguez RN

## 2024-05-31 NOTE — TELEPHONE ENCOUNTER
Called and left detailed message for verbal orders on confidential voice mail and to please send the orders by fax to sign.    Isabella Salinas RN on 5/31/2024 at 3:02 PM

## 2024-06-06 NOTE — PROGRESS NOTES
~Cardiology CORE Clinic Visit~    Primary Cardiologist: Dr. Diaz  Reason for visit: CORE follow up    History of Present Illness    Bess Enamorado is a wonderfully pleasant 48 year old female with a past medical history notable for diastolic CHF, tachycardia, HFpEF, history of NSTEMI, hypertension, severe pulmonary hypertension, NARCISA on CPAP, morbid obesity with BMI around 56, MS, lymphedema, history of pulmonary embolism, renal insufficiency.    In past, patient used to see Dr. Sanchez at the [facility] for RV failure.  She last saw him in 2022.  She then was referred to [facility] where she met Dr. Diaz for severe RV failure and cor pulmonale.    In summary, patient has history of morbid obesity and uses a wheelchair assistance for mobility.  She has done this for the past many years.  She has sleep apnea and is compliant with CPAP use.  She has been on Bumex for many years.  She has lymphedema in addition to RV failure.    Over the years, patient has had progressive RV dilation and dysfunction on her echocardiogram.  Her PA pressures are close to 70+ RAP, which are also estimated to be very high based on IVC size.  Her LV function is hyperdynamic.  She has mild mitral stenosis.    She was seen in cardiology clinic in April 2024 as a new heart failure visit.  At that time, patient noted some degree of orthopnea, but mainly has severe symptoms of right-sided congestive heart failure.  At that point, she was on 3 mg of Bumex daily.  Patient notes to have an eating disorder of consuming excessive amounts of calories and food.  She has sought treatment for this in the past; continues to work with a therapist.  She denied angina, syncope or presyncope.  She is a poor functional status.    Since that visit, patient has been hospitalized multiple times.  First from 4/18 - 4/23/2024 with left leg cellulitis.  Then from 4/26 - 5/1/2024 with C. difficile and cellulitis of the abdominal wall.   When she discharged from the hospital on 5/1/2024, her weight was noted to be approximately 304 pounds (vs 330# at her LOV with us).    Interval 06/11/24    Cardiopulmonary symptoms: Still has some orthopnea when laying back in the recliner - wears CPAP when reclined watching TV during the day and during sleep.  Continues PRN O2 use during the day.  Chronic lymphedema, no CP.  States she cannot close both foot rests on her wheelchair due to LE edema and thigh size - as she diuresis would anticipate this improves.  Functional/ADL status: She is able to do some walking with a walker, primarily wheelchair bound.  Working with therapies.  Balance and stamina have improved.  Her back was able tolerate physical therapy exercise better with weight loss.  Today's clinic weight: 296#   Hemodynamics: stable.    Has not yet started Zepbound; she has additional questions for her PCP.  __________________________________________________________________         Assessment and Impression:     Severe right-sided heart failure  Critical pulmonary hypertension, chronic  Severe right-sided vascular congestion and renal congestion causing inability to achieve adequate diuresis.  Dietary noncompliance and morbid obesity.  Limited functional capacity -mobility via wheelchair.  Currently on p.o. Bumex to 4 mg twice daily (8 AM + 2 PM) + KCl 20 mEq twice daily.  Chronic lymphedema  NARCISA on CPAP  Multiple sclerosis  Per last hospitalization, patient immobile and does not routinely follow with neurology.  Not on any PTA medications for MS.  She was given a written neurology referral and given progressive respiratory failure of the past 3 to 4 months was recommended to follow-up with them to evaluate if there is any component of neuromuscular weakness secondary to MS driving her PH.  Morbid obesity, prescribed Zepbound  History of pulmonary embolism  History of NSTEMI, on aspirin 81 mg daily.         Recommendations and Plan:     Continue  current regimen without changes.  Ongoing lifestyle modification (diet and exercise) for healthy weight loss and fluid loss with salt restriction.  She has made good efforts in the past few weeks to improve her dietary choices.  Pt counseled to call RN for weight over 300# - will discuss augmenting Bumex vs Furoscix pending status at that time.  Next labs: BMP this week, and again in 6-weeks.  Follow up with ANNAMARIE CORE in 6-weeks.  __________________________________________________________________    Thank you for the opportunity to participate in this pleasant patient's care.    We would be happy to see this patient sooner for any concerns in the meantime.    JULIETA Anguiano, CNP   Nurse Practitioner  [facility]    Today's clinic visit entailed:  The following tests were independently interpreted by me as noted in my documentation: labs, echo  Ordering of each unique test  Prescription drug management  Care everywhere reviewed.  Hospital records, notes and testing reviewed.  The level of medical decision making during this visit was of moderate complexity.  Review of the result(s) of each unique test - cardiac testing, cardiac imaging, labs  Care everywhere reviewed for additional records to facilitate comprehensive patient care.    Orders this Visit:  Orders Placed This Encounter   Procedures    Basic metabolic panel    Basic metabolic panel    Follow-Up with Cardiology- Core     No orders of the defined types were placed in this encounter.    There are no discontinued medications.  Encounter Diagnoses   Name Primary?    Chronic heart failure with preserved ejection fraction (H)     Chronic diastolic congestive heart failure (H) Yes    NARCISA on CPAP     Morbid obesity (H)     Class 3 severe obesity due to excess calories with serious comorbidity and body mass index (BMI) of 50.0 to 59.9 in adult (H)     Pulmonary hypertension (H)     Lymphedema of both lower extremities     Obesity  "hypoventilation syndrome (H)      CURRENT MEDICATIONS:  Current Outpatient Medications   Medication Sig Dispense Refill    acetaminophen (TYLENOL) 650 MG CR tablet Take 650 mg by mouth every 8 hours as needed for mild pain or fever      aspirin (ASPIRIN LOW DOSE) 81 MG EC tablet Take 1 tablet (81 mg) by mouth daily      bumetanide (BUMEX) 2 MG tablet Take 2 tablets (4 mg) by mouth 2 times daily 120 tablet 2    Emollient (GOLD BOND MEDICATED BODY EX) Externally apply 1 Application topically 2 times daily as needed      empagliflozin (JARDIANCE) 10 MG TABS tablet Take 1 tablet (10 mg) by mouth daily 90 tablet 1    metoprolol succinate ER (TOPROL XL) 25 MG 24 hr tablet Take 1 tablet (25 mg) by mouth daily 90 tablet 3    miconazole (MICATIN) 2 % AERP powder Apply topically 2 times daily as needed 128 g 3    potassium chloride sheila ER (KLOR-CON M20) 20 MEQ CR tablet Take 1 tablet (20 mEq) by mouth 2 times daily 180 tablet 3    sertraline (ZOLOFT) 100 MG tablet Take 1 tablet (100 mg) by mouth daily 90 tablet 3    spironolactone (ALDACTONE) 25 MG tablet Take 0.5 tablets (12.5 mg) by mouth daily for 30 days 15 tablet 0    tirzepatide-Weight Management (ZEPBOUND) 2.5 MG/0.5ML prefilled pen Inject 0.5 mLs (2.5 mg) Subcutaneous every 7 days (Patient not taking: Reported on 6/11/2024) 2 mL 0     ALLERGIES     Allergies   Allergen Reactions    Nkda [No Known Drug Allergy]      PAST MEDICAL, SURGICAL, FAMILY, SOCIAL HISTORY:  History was reviewed and updated as needed, see medical record.    Review of Systems:  A 10-point Review Of Systems is otherwise normal except for that which is noted in the HPI and interval summary.    Physical Exam:    Vitals: /83 (BP Location: Right arm, Patient Position: Sitting, Cuff Size: Adult Large)   Pulse 85   Resp 20   Ht 1.626 m (5' 4\")   Wt 134.3 kg (296 lb)   LMP 06/04/2024 (Exact Date)   SpO2 95%   BMI 50.81 kg/m    Constitutional: Appears stated age, well nourished, NAD.  Neck: " Supple. Carotid pulses full and equal.   Respiratory: Non-labored. Lungs CTAB.  Cardiovascular: RRR, split S1 and S2. No M/G/R.  Chronic lymphedema edema.  GI: Soft, non-distended, non-tender.  Skin: Warm and dry.   Musculoskeletal/Extremities: Symmetrical movement.  Neurologic: No gross focal deficits. Alert, awake.  Psychiatric: Affect appropriate. Mentation normal.    Recent Lab Results:  LIPID RESULTS:  Lab Results   Component Value Date    CHOL 93 09/27/2023    CHOL 162 02/11/2016    HDL 28 (L) 09/27/2023    HDL 64 02/11/2016    LDL 42 09/27/2023    LDL 75 02/11/2016    TRIG 116 09/27/2023    TRIG 113 02/11/2016    CHOLHDLRATIO 4.0 11/07/2011     LIVER ENZYME RESULTS:  Lab Results   Component Value Date    AST 32 04/27/2024    AST 18 03/07/2021    ALT 13 04/27/2024    ALT 17 03/07/2021     CBC RESULTS:  Lab Results   Component Value Date    WBC 4.1 05/01/2024    WBC 7.3 03/07/2021    RBC 5.54 (H) 05/01/2024    RBC 4.91 03/07/2021    HGB 14.9 05/01/2024    HGB 12.5 03/07/2021    HCT 48.9 (H) 05/01/2024    HCT 42.8 03/07/2021    MCV 88 05/01/2024    MCV 87 03/07/2021    MCH 26.9 05/01/2024    MCH 25.5 (L) 03/07/2021    MCHC 30.5 (L) 05/01/2024    MCHC 29.2 (L) 03/07/2021    RDW 16.9 (H) 05/01/2024    RDW 15.6 (H) 03/07/2021     05/01/2024     03/08/2021     BMP RESULTS:  Lab Results   Component Value Date     05/01/2024     03/07/2021    POTASSIUM 3.6 05/01/2024    POTASSIUM 3.6 09/06/2022    POTASSIUM 4.3 03/07/2021    CHLORIDE 101 05/01/2024    CHLORIDE 98 09/06/2022    CHLORIDE 105 03/07/2021    CO2 27 05/01/2024    CO2 25 09/06/2022    CO2 30 03/07/2021    ANIONGAP 11 05/01/2024    ANIONGAP 11 09/06/2022    ANIONGAP 4 03/07/2021    GLC 86 05/01/2024    GLC 89 04/18/2024    GLC 94 09/06/2022    GLC 99 03/07/2021    BUN 8.9 05/01/2024    BUN 22 09/06/2022    BUN 12 03/07/2021    CR 0.65 05/01/2024    CR 0.62 03/07/2021    GFRESTIMATED >90 05/01/2024    GFRESTIMATED >90 03/07/2021     GFRESTBLACK >90 03/07/2021    EVITA 9.2 05/01/2024    EVITA 9.4 03/07/2021      A1C RESULTS:  Lab Results   Component Value Date    A1C 5.7 (H) 02/13/2020     INR RESULTS:  Lab Results   Component Value Date    INR 1.35 (H) 04/22/2024    INR 1.23 (H) 02/11/2022    INR 0.96 04/19/2013

## 2024-06-07 ENCOUNTER — TELEPHONE (OUTPATIENT)
Dept: FAMILY MEDICINE | Facility: CLINIC | Age: 49
End: 2024-06-07
Payer: MEDICARE

## 2024-06-07 NOTE — TELEPHONE ENCOUNTER
Home Health Care    Reason for call:  Echo with [facility] calling for verbal orders     Orders are needed for this patient.  PT: ok to cancel visit on 6/7 and reschedule to the week on 6/10 as patient was ill today (6/7)    Pt Provider: Dr. Luna    Phone Number Homecare Nurse can be reached at: [phone]

## 2024-06-11 ENCOUNTER — OFFICE VISIT (OUTPATIENT)
Dept: CARDIOLOGY | Facility: CLINIC | Age: 49
End: 2024-06-11
Attending: INTERNAL MEDICINE
Payer: MEDICARE

## 2024-06-11 VITALS
OXYGEN SATURATION: 95 % | DIASTOLIC BLOOD PRESSURE: 83 MMHG | BODY MASS INDEX: 50.02 KG/M2 | WEIGHT: 293 LBS | RESPIRATION RATE: 20 BRPM | HEIGHT: 64 IN | HEART RATE: 85 BPM | SYSTOLIC BLOOD PRESSURE: 116 MMHG

## 2024-06-11 DIAGNOSIS — E66.2 OBESITY HYPOVENTILATION SYNDROME (H): ICD-10-CM

## 2024-06-11 DIAGNOSIS — I89.0 LYMPHEDEMA OF BOTH LOWER EXTREMITIES: ICD-10-CM

## 2024-06-11 DIAGNOSIS — I27.20 PULMONARY HYPERTENSION (H): ICD-10-CM

## 2024-06-11 DIAGNOSIS — I50.32 CHRONIC HEART FAILURE WITH PRESERVED EJECTION FRACTION (H): ICD-10-CM

## 2024-06-11 DIAGNOSIS — E66.01 CLASS 3 SEVERE OBESITY DUE TO EXCESS CALORIES WITH SERIOUS COMORBIDITY AND BODY MASS INDEX (BMI) OF 50.0 TO 59.9 IN ADULT (H): ICD-10-CM

## 2024-06-11 DIAGNOSIS — I50.32 CHRONIC DIASTOLIC CONGESTIVE HEART FAILURE (H): Primary | ICD-10-CM

## 2024-06-11 DIAGNOSIS — E66.813 CLASS 3 SEVERE OBESITY DUE TO EXCESS CALORIES WITH SERIOUS COMORBIDITY AND BODY MASS INDEX (BMI) OF 50.0 TO 59.9 IN ADULT (H): ICD-10-CM

## 2024-06-11 DIAGNOSIS — G47.33 OSA ON CPAP: ICD-10-CM

## 2024-06-11 DIAGNOSIS — E66.01 MORBID OBESITY (H): ICD-10-CM

## 2024-06-11 PROCEDURE — 99214 OFFICE O/P EST MOD 30 MIN: CPT | Performed by: NURSE PRACTITIONER

## 2024-06-11 NOTE — PATIENT INSTRUCTIONS
Thank you for your visit with the [facility].    Today's Summary:    Continue current medications without changes today.  Repeat labs this week with nurse.  Labs in 6-weeks.  Talk with primary doctor about Zepbound questions you have.  If your weight goes above 300#, call the nurse to discuss Furosix vs increasing Bumex.  We would also need to get labs afterwards, so would have to coordinate that after a diuretic infusion.    Follow-up:  Cardiology follow up: 6 week follow up with CORE ANNAMARIE.     Call CORE nurse for any questions or concerns Mon-Fri 8am-4pm: #[phone]    For concerns after hours: #[phone], option 2    It was a pleasure seeing you today.     JULIETA Anguiano, CNP  Certified Nurse Practitioner  [facility]  June 11, 2024  ________________________________________________________

## 2024-06-11 NOTE — LETTER
6/11/2024    Mikala Luna MD  [id number]  [facility] [address]    RE: Bess Enamorado       Dear Colleague,     I had the pleasure of seeing Bess Enamorado in the [facility].          ~Cardiology CORE Clinic Visit~    Primary Cardiologist: Dr. Diaz  Reason for visit: CORE follow up    History of Present Illness    Bess Enamorado is a wonderfully pleasant 48 year old female with a past medical history notable for diastolic CHF, tachycardia, HFpEF, history of NSTEMI, hypertension, severe pulmonary hypertension, NARCISA on CPAP, morbid obesity with BMI around 56, MS, lymphedema, history of pulmonary embolism, renal insufficiency.    In past, patient used to see Dr. Sanchez at the [facility] for RV failure.  She last saw him in 2022.  She then was referred to [facility] where she met Dr. Diaz for severe RV failure and cor pulmonale.    In summary, patient has history of morbid obesity and uses a wheelchair assistance for mobility.  She has done this for the past many years.  She has sleep apnea and is compliant with CPAP use.  She has been on Bumex for many years.  She has lymphedema in addition to RV failure.    Over the years, patient has had progressive RV dilation and dysfunction on her echocardiogram.  Her PA pressures are close to 70+ RAP, which are also estimated to be very high based on IVC size.  Her LV function is hyperdynamic.  She has mild mitral stenosis.    She was seen in cardiology clinic in April 2024 as a new heart failure visit.  At that time, patient noted some degree of orthopnea, but mainly has severe symptoms of right-sided congestive heart failure.  At that point, she was on 3 mg of Bumex daily.  Patient notes to have an eating disorder of consuming excessive amounts of calories and food.  She has sought treatment for this in the past; continues to work with a therapist.  She denied angina, syncope or presyncope.  She is a poor functional  status.    Since that visit, patient has been hospitalized multiple times.  First from 4/18 - 4/23/2024 with left leg cellulitis.  Then from 4/26 - 5/1/2024 with C. difficile and cellulitis of the abdominal wall.  When she discharged from the hospital on 5/1/2024, her weight was noted to be approximately 304 pounds (vs 330# at her LOV with us).    Interval 06/11/24    Cardiopulmonary symptoms: Still has some orthopnea when laying back in the recliner - wears CPAP when reclined watching TV during the day and during sleep.  Continues PRN O2 use during the day.  Chronic lymphedema, no CP.  States she cannot close both foot rests on her wheelchair due to LE edema and thigh size - as she diuresis would anticipate this improves.  Functional/ADL status: She is able to do some walking with a walker, primarily wheelchair bound.  Working with therapies.  Balance and stamina have improved.  Her back was able tolerate physical therapy exercise better with weight loss.  Today's clinic weight: 296#   Hemodynamics: stable.    Has not yet started Zepbound; she has additional questions for her PCP.  __________________________________________________________________         Assessment and Impression:     Severe right-sided heart failure  Critical pulmonary hypertension, chronic  Severe right-sided vascular congestion and renal congestion causing inability to achieve adequate diuresis.  Dietary noncompliance and morbid obesity.  Limited functional capacity -mobility via wheelchair.  Currently on p.o. Bumex to 4 mg twice daily (8 AM + 2 PM) + KCl 20 mEq twice daily.  Chronic lymphedema  NARCISA on CPAP  Multiple sclerosis  Per last hospitalization, patient immobile and does not routinely follow with neurology.  Not on any PTA medications for MS.  She was given a written neurology referral and given progressive respiratory failure of the past 3 to 4 months was recommended to follow-up with them to evaluate if there is any component of  neuromuscular weakness secondary to MS driving her PH.  Morbid obesity, prescribed Zepbound  History of pulmonary embolism  History of NSTEMI, on aspirin 81 mg daily.         Recommendations and Plan:     Continue current regimen without changes.  Ongoing lifestyle modification (diet and exercise) for healthy weight loss and fluid loss with salt restriction.  She has made good efforts in the past few weeks to improve her dietary choices.  Pt counseled to call RN for weight over 300# - will discuss augmenting Bumex vs Furoscix pending status at that time.  Next labs: BMP this week, and again in 6-weeks.  Follow up with ANNAMARIE CORE in 6-weeks.  __________________________________________________________________    Thank you for the opportunity to participate in this pleasant patient's care.    We would be happy to see this patient sooner for any concerns in the meantime.    JULIETA Anguiano, CNP   Nurse Practitioner  [facility]    Today's clinic visit entailed:  The following tests were independently interpreted by me as noted in my documentation: labs, echo  Ordering of each unique test  Prescription drug management  Care everywhere reviewed.  Hospital records, notes and testing reviewed.  The level of medical decision making during this visit was of moderate complexity.  Review of the result(s) of each unique test - cardiac testing, cardiac imaging, labs  Care everywhere reviewed for additional records to facilitate comprehensive patient care.    Orders this Visit:  Orders Placed This Encounter   Procedures    Basic metabolic panel    Basic metabolic panel    Follow-Up with Cardiology- Core     No orders of the defined types were placed in this encounter.    There are no discontinued medications.  Encounter Diagnoses   Name Primary?    Chronic heart failure with preserved ejection fraction (H)     Chronic diastolic congestive heart failure (H) Yes    NARCISA on CPAP     Morbid obesity (H)      Class 3 severe obesity due to excess calories with serious comorbidity and body mass index (BMI) of 50.0 to 59.9 in adult (H)     Pulmonary hypertension (H)     Lymphedema of both lower extremities     Obesity hypoventilation syndrome (H)      CURRENT MEDICATIONS:  Current Outpatient Medications   Medication Sig Dispense Refill    acetaminophen (TYLENOL) 650 MG CR tablet Take 650 mg by mouth every 8 hours as needed for mild pain or fever      aspirin (ASPIRIN LOW DOSE) 81 MG EC tablet Take 1 tablet (81 mg) by mouth daily      bumetanide (BUMEX) 2 MG tablet Take 2 tablets (4 mg) by mouth 2 times daily 120 tablet 2    Emollient (GOLD BOND MEDICATED BODY EX) Externally apply 1 Application topically 2 times daily as needed      empagliflozin (JARDIANCE) 10 MG TABS tablet Take 1 tablet (10 mg) by mouth daily 90 tablet 1    metoprolol succinate ER (TOPROL XL) 25 MG 24 hr tablet Take 1 tablet (25 mg) by mouth daily 90 tablet 3    miconazole (MICATIN) 2 % AERP powder Apply topically 2 times daily as needed 128 g 3    potassium chloride sheila ER (KLOR-CON M20) 20 MEQ CR tablet Take 1 tablet (20 mEq) by mouth 2 times daily 180 tablet 3    sertraline (ZOLOFT) 100 MG tablet Take 1 tablet (100 mg) by mouth daily 90 tablet 3    spironolactone (ALDACTONE) 25 MG tablet Take 0.5 tablets (12.5 mg) by mouth daily for 30 days 15 tablet 0    tirzepatide-Weight Management (ZEPBOUND) 2.5 MG/0.5ML prefilled pen Inject 0.5 mLs (2.5 mg) Subcutaneous every 7 days (Patient not taking: Reported on 6/11/2024) 2 mL 0     ALLERGIES     Allergies   Allergen Reactions    Nkda [No Known Drug Allergy]      PAST MEDICAL, SURGICAL, FAMILY, SOCIAL HISTORY:  History was reviewed and updated as needed, see medical record.    Review of Systems:  A 10-point Review Of Systems is otherwise normal except for that which is noted in the HPI and interval summary.    Physical Exam:    Vitals: /83 (BP Location: Right arm, Patient Position: Sitting, Cuff Size:  "Adult Large)   Pulse 85   Resp 20   Ht 1.626 m (5' 4\")   Wt 134.3 kg (296 lb)   LMP 06/04/2024 (Exact Date)   SpO2 95%   BMI 50.81 kg/m    Constitutional: Appears stated age, well nourished, NAD.  Neck: Supple. Carotid pulses full and equal.   Respiratory: Non-labored. Lungs CTAB.  Cardiovascular: RRR, split S1 and S2. No M/G/R.  Chronic lymphedema edema.  GI: Soft, non-distended, non-tender.  Skin: Warm and dry.   Musculoskeletal/Extremities: Symmetrical movement.  Neurologic: No gross focal deficits. Alert, awake.  Psychiatric: Affect appropriate. Mentation normal.    Recent Lab Results:  LIPID RESULTS:  Lab Results   Component Value Date    CHOL 93 09/27/2023    CHOL 162 02/11/2016    HDL 28 (L) 09/27/2023    HDL 64 02/11/2016    LDL 42 09/27/2023    LDL 75 02/11/2016    TRIG 116 09/27/2023    TRIG 113 02/11/2016    CHOLHDLRATIO 4.0 11/07/2011     LIVER ENZYME RESULTS:  Lab Results   Component Value Date    AST 32 04/27/2024    AST 18 03/07/2021    ALT 13 04/27/2024    ALT 17 03/07/2021     CBC RESULTS:  Lab Results   Component Value Date    WBC 4.1 05/01/2024    WBC 7.3 03/07/2021    RBC 5.54 (H) 05/01/2024    RBC 4.91 03/07/2021    HGB 14.9 05/01/2024    HGB 12.5 03/07/2021    HCT 48.9 (H) 05/01/2024    HCT 42.8 03/07/2021    MCV 88 05/01/2024    MCV 87 03/07/2021    MCH 26.9 05/01/2024    MCH 25.5 (L) 03/07/2021    MCHC 30.5 (L) 05/01/2024    MCHC 29.2 (L) 03/07/2021    RDW 16.9 (H) 05/01/2024    RDW 15.6 (H) 03/07/2021     05/01/2024     03/08/2021     BMP RESULTS:  Lab Results   Component Value Date     05/01/2024     03/07/2021    POTASSIUM 3.6 05/01/2024    POTASSIUM 3.6 09/06/2022    POTASSIUM 4.3 03/07/2021    CHLORIDE 101 05/01/2024    CHLORIDE 98 09/06/2022    CHLORIDE 105 03/07/2021    CO2 27 05/01/2024    CO2 25 09/06/2022    CO2 30 03/07/2021    ANIONGAP 11 05/01/2024    ANIONGAP 11 09/06/2022    ANIONGAP 4 03/07/2021    GLC 86 05/01/2024    GLC 89 04/18/2024    GLC " 94 09/06/2022    GLC 99 03/07/2021    BUN 8.9 05/01/2024    BUN 22 09/06/2022    BUN 12 03/07/2021    CR 0.65 05/01/2024    CR 0.62 03/07/2021    GFRESTIMATED >90 05/01/2024    GFRESTIMATED >90 03/07/2021    GFRESTBLACK >90 03/07/2021    EVITA 9.2 05/01/2024    EVITA 9.4 03/07/2021      A1C RESULTS:  Lab Results   Component Value Date    A1C 5.7 (H) 02/13/2020     INR RESULTS:  Lab Results   Component Value Date    INR 1.35 (H) 04/22/2024    INR 1.23 (H) 02/11/2022    INR 0.96 04/19/2013         Thank you for allowing me to participate in the care of your patient.      Sincerely,     JULIETA Anguiano [facility]  cc:   Agnieszka Diaz MD  [address] [place]  [place] [id number]

## 2024-06-13 ENCOUNTER — TELEPHONE (OUTPATIENT)
Dept: FAMILY MEDICINE | Facility: CLINIC | Age: 49
End: 2024-06-13
Payer: MEDICARE

## 2024-06-13 NOTE — TELEPHONE ENCOUNTER
Home Care is calling regarding an established patient with [facility].       Requesting orders from: Mikala Luna  Provider is following patient: Yes  Is this a 60-day recertification request?  No    Orders Requested    Skilled Nursing  Request for delay in BMP draw, service is not able to be provided within same scheduled day as they were unable to get blood. Rescheduled to next Thursday. .         Information was gathered and will be sent to provider for review.  RN will contact Home Care with information after provider review.  Confirmed ok to leave a detailed message with call back.  Contact information confirmed and updated as needed.    Jessica Goode RN

## 2024-06-20 ENCOUNTER — LAB REQUISITION (OUTPATIENT)
Dept: LAB | Facility: CLINIC | Age: 49
End: 2024-06-20
Payer: MEDICARE

## 2024-06-20 ENCOUNTER — TELEPHONE (OUTPATIENT)
Dept: FAMILY MEDICINE | Facility: CLINIC | Age: 49
End: 2024-06-20
Payer: MEDICARE

## 2024-06-20 DIAGNOSIS — I50.33 ACUTE ON CHRONIC DIASTOLIC (CONGESTIVE) HEART FAILURE (H): ICD-10-CM

## 2024-06-20 LAB
ANION GAP SERPL CALCULATED.3IONS-SCNC: 15 MMOL/L (ref 7–15)
BUN SERPL-MCNC: 16.3 MG/DL (ref 6–20)
CALCIUM SERPL-MCNC: 9.7 MG/DL (ref 8.6–10)
CHLORIDE SERPL-SCNC: 100 MMOL/L (ref 98–107)
CREAT SERPL-MCNC: 0.71 MG/DL (ref 0.51–0.95)
DEPRECATED HCO3 PLAS-SCNC: 23 MMOL/L (ref 22–29)
EGFRCR SERPLBLD CKD-EPI 2021: >90 ML/MIN/1.73M2
GLUCOSE SERPL-MCNC: 88 MG/DL (ref 70–99)
POTASSIUM SERPL-SCNC: 3.4 MMOL/L (ref 3.4–5.3)
SODIUM SERPL-SCNC: 138 MMOL/L (ref 135–145)

## 2024-06-20 PROCEDURE — 80048 BASIC METABOLIC PNL TOTAL CA: CPT | Mod: ORL | Performed by: NURSE PRACTITIONER

## 2024-06-20 NOTE — TELEPHONE ENCOUNTER
Home Health Care    Reason for call:  PT orders    Orders are needed for this patient.  PT: one visit every other week  x 8 weeks    Pt Provider: Mikala Luna    Phone Number Homecare Nurse can be reached at: [phone]  Has secure voicemail  Chaz Crouch on 6/20/2024 at 1:23 PM

## 2024-06-25 ENCOUNTER — MEDICAL CORRESPONDENCE (OUTPATIENT)
Dept: HEALTH INFORMATION MANAGEMENT | Facility: CLINIC | Age: 49
End: 2024-06-25

## 2024-07-05 ENCOUNTER — PRE VISIT (OUTPATIENT)
Dept: GASTROENTEROLOGY | Facility: CLINIC | Age: 49
End: 2024-07-05
Payer: MEDICARE

## 2024-07-05 ENCOUNTER — VIRTUAL VISIT (OUTPATIENT)
Dept: GASTROENTEROLOGY | Facility: CLINIC | Age: 49
End: 2024-07-05
Attending: INTERNAL MEDICINE
Payer: MEDICARE

## 2024-07-05 DIAGNOSIS — K74.60 CIRRHOSIS OF LIVER WITHOUT ASCITES, UNSPECIFIED HEPATIC CIRRHOSIS TYPE (H): ICD-10-CM

## 2024-07-05 PROCEDURE — 99204 OFFICE O/P NEW MOD 45 MIN: CPT | Mod: 95 | Performed by: STUDENT IN AN ORGANIZED HEALTH CARE EDUCATION/TRAINING PROGRAM

## 2024-07-05 NOTE — LETTER
7/5/2024      Bess Enamorado  [address]  [place] [address]      Dear Colleague,    Thank you for referring your patient, Bess Enamorado, to the [facility] [place]. Please see a copy of my visit note below.    Virtual Visit Details    Type of service:  Video Visit   Video Start Time: 11:01 AM  Video End Time:11:16 AM    Originating Location (pt. Location): Home  Distant Location (provider location):  On-site  Platform used for Video Visit: [facility] New Patient Visit    Date of Visit: July 5, 2024    Reason for referral: Evaluate liver disease    Subjective: Ms. Enamorado is a 48-year-old with a history of obesity, NARCISA, diastolic heart failure with pulmonary hypertension, who presents for evaluation of abnormal liver imaging.    Had hepatomegaly and steatosis seen on imaging the past.  More recently in 2024 she had CT scan that showed a lobular appearing liver.  No signs of splenomegaly.  No other signs of portal hypertension.    She does not drink alcohol.  Testing for viral hepatitis is negative.    She does not have diabetes but has prediabetes.  She was prescribed Zepbound, has not started it yet.    No previous known liver disease, abnormal LFTs, imaging tests. No known history of liver decompensation    ROS: 14 point ROS negative except for positives noted in HPI.    PMHx:  Past Medical History:   Diagnosis Date    Acute on chronic diastolic congestive heart failure (H) 02/09/2022    Arthritis 2019    Hips    ASCUS favor benign 08/2015    Neg HPV    Depressive disorder 2010    H/O colposcopy with cervical biopsy 09/14/2015    Bx & ECC - negative    Hypertension 2019    LSIL on Pap smear 07/2014    Neg high risk HPV    Multiple sclerosis (H) 08/29/2005 9/18/200pt dx with MS 2004--dx by neurolpau and is followed by Dr. Harris    Myocardial infarction (H)     Obese     Pneumonia due to infectious organism, unspecified laterality, unspecified  part of lung 02/08/2022    Sepsis (Temp 101, , WBC 13.0) 10/23/2018    Shingles 10/2016    SIRS (systemic inflammatory response syndrome) (H) 03/04/2021    Sleep apnea     UNSPEC CONSTIPATION 09/18/2006 9/18/2006   Has tried otc suppository and otc lax.    PreDM  PCOS    PSHx:  Past Surgical History:   Procedure Laterality Date    CHOLECYSTECTOMY, LAPOROSCOPIC      Cholecystectomy, Laparoscopic    COLONOSCOPY  2007?    Bathroom issue..results normal    COMBINED CYSTOSCOPY, RETROGRADES, EXCHANGE STENT URETER(S) Right 2/21/2022    Procedure: CYSTOSCOPY, WITH right RETROGRADE PYELOGRAM AND right URETERAL STENT PLACEMENT;  Surgeon: Doyle Palomares MD;  Location: [facility] OR    OPEN REDUCTION INTERNAL FIXATION TOE(S)  4/13/2012    Procedure:OPEN REDUCTION INTERNAL FIXATION TOE(S); Open reduction internal fixation right proximal fifth metatarsal fracture-   Anes-choice block; Surgeon:LEY, JEFFREY DUANE; Location:[place] OR    PICC SINGLE LUMEN PLACEMENT  3/20/2024    PICC TRIPLE LUMEN PLACEMENT  2/21/2022            FamHx:  Family History   Problem Relation Age of Onset    Neurologic Disorder Father         MS    Heart Disease Father         irregular heart rate    Diabetes Father     Melanoma Father     Sleep Apnea Father     Other Cancer Father     Cancer Maternal Grandfather         lung    Other Cancer Maternal Grandfather     Cancer Paternal Grandmother         stomach    Other Cancer Paternal Grandmother     Cancer Mother     Other Cancer Mother     Thyroid Disease Mother     Gynecology Maternal Aunt         PCOS    Hypertension Maternal Grandmother     Anxiety Disorder Maternal Grandmother     Thyroid Disease Maternal Grandmother     Anxiety Disorder Sister      No family history of liver disease, liver cancer    SocHx:  Social History     Socioeconomic History    Marital status: Single     Spouse name: Not on file    Number of children: Not on file    Years of education: Not on file    Highest education  level: Not on file   Occupational History     Employer: [employer]   Tobacco Use    Smoking status: Never    Smokeless tobacco: Never   Vaping Use    Vaping status: Never Used   Substance and Sexual Activity    Alcohol use: Yes     Comment: social    Drug use: No    Sexual activity: Not Currently     Partners: Male     Birth control/protection: Pill   Other Topics Concern    Parent/sibling w/ CABG, MI or angioplasty before 65F 55M? No   Social History Narrative    , 3rd-5th grade     Social Determinants of Health     Financial Resource Strain: Low Risk  (9/25/2023)    Financial Resource Strain     Within the past 12 months, have you or your family members you live with been unable to get utilities (heat, electricity) when it was really needed?: No   Food Insecurity: Low Risk  (3/21/2024)    Food Insecurity     Within the past 12 months, did you worry that your food would run out before you got money to buy more?: No     Within the past 12 months, did the food you bought just not last and you didn t have money to get more?: No   Transportation Needs: Low Risk  (3/21/2024)    Transportation Needs     Within the past 12 months, has lack of transportation kept you from medical appointments, getting your medicines, non-medical meetings or appointments, work, or from getting things that you need?: No   Physical Activity: Inactive (3/21/2024)    Exercise Vital Sign     Days of Exercise per Week: 0 days     Minutes of Exercise per Session: 0 min   Stress: No Stress Concern Present (12/4/2020)    [place] [employer] - Occupational Stress Questionnaire     Feeling of Stress : Only a little   Social Connections: Unknown (3/1/2022)    Social Connection and Isolation Panel [NHANES]     Frequency of Communication with Friends and Family: Twice a week     Frequency of Social Gatherings with Friends and Family: Twice a week     Attends Latter day Services: Not on file     Active  Member of Clubs or Organizations: Not on file     Attends Club or Organization Meetings: Not on file     Marital Status: Not on file   Interpersonal Safety: Low Risk  (9/27/2023)    Interpersonal Safety     Do you feel physically and emotionally safe where you currently live?: Yes     Within the past 12 months, have you been hit, slapped, kicked or otherwise physically hurt by someone?: No     Within the past 12 months, have you been humiliated or emotionally abused in other ways by your partner or ex-partner?: No   Housing Stability: Low Risk  (3/21/2024)    Housing Stability     Do you have housing? : Yes     Are you worried about losing your housing?: No   Rare alcohol use - last November       Medications:  Current Outpatient Medications   Medication Sig Dispense Refill    acetaminophen (TYLENOL) 650 MG CR tablet Take 650 mg by mouth every 8 hours as needed for mild pain or fever      aspirin (ASPIRIN LOW DOSE) 81 MG EC tablet Take 1 tablet (81 mg) by mouth daily      bumetanide (BUMEX) 2 MG tablet Take 2 tablets (4 mg) by mouth 2 times daily 120 tablet 2    empagliflozin (JARDIANCE) 10 MG TABS tablet Take 1 tablet (10 mg) by mouth daily 90 tablet 1    metoprolol succinate ER (TOPROL XL) 25 MG 24 hr tablet Take 1 tablet (25 mg) by mouth daily 90 tablet 3    potassium chloride sheila ER (KLOR-CON M20) 20 MEQ CR tablet Take 1 tablet (20 mEq) by mouth 2 times daily 180 tablet 3    sertraline (ZOLOFT) 100 MG tablet Take 1 tablet (100 mg) by mouth daily 90 tablet 3    spironolactone (ALDACTONE) 25 MG tablet Take 0.5 tablets (12.5 mg) by mouth daily for 30 days 15 tablet 0    Emollient (GOLD BOND MEDICATED BODY EX) Externally apply 1 Application topically 2 times daily as needed      miconazole (MICATIN) 2 % AERP powder Apply topically 2 times daily as needed 128 g 3    tirzepatide-Weight Management (ZEPBOUND) 2.5 MG/0.5ML prefilled pen Inject 0.5 mLs (2.5 mg) Subcutaneous every 7 days (Patient not taking: Reported on  7/5/2024) 2 mL 0     No current facility-administered medications for this visit.     No OTCs, herbals    Allergies:  Allergies   Allergen Reactions    Nkda [No Known Drug Allergy]        Objective:  LMP 06/04/2024 (Exact Date)   Constitutional: pleasant woman in NAD  Eyes: non icteric  Respiratory: Normal respiratory excursion   MSK: normal range of motion of visualized extremities  Abd: Non distended  Skin: No jaundice  Psychiatric: normal mood and orientation    Labs:  Last Comprehensive Metabolic Panel:  Sodium   Date Value Ref Range Status   06/20/2024 138 135 - 145 mmol/L Final     Comment:     Reference intervals for this test were updated on 09/26/2023 to more accurately reflect our healthy population. There may be differences in the flagging of prior results with similar values performed with this method. Interpretation of those prior results can be made in the context of the updated reference intervals.    03/07/2021 139 133 - 144 mmol/L Final     Potassium   Date Value Ref Range Status   06/20/2024 3.4 3.4 - 5.3 mmol/L Final   09/06/2022 3.6 3.5 - 5.0 mmol/L Final   03/07/2021 4.3 3.4 - 5.3 mmol/L Final     Chloride   Date Value Ref Range Status   06/20/2024 100 98 - 107 mmol/L Final   09/06/2022 98 98 - 107 mmol/L Final   03/07/2021 105 94 - 109 mmol/L Final     Carbon Dioxide   Date Value Ref Range Status   03/07/2021 30 20 - 32 mmol/L Final     Carbon Dioxide (CO2)   Date Value Ref Range Status   06/20/2024 23 22 - 29 mmol/L Final   09/06/2022 25 22 - 31 mmol/L Final     Anion Gap   Date Value Ref Range Status   06/20/2024 15 7 - 15 mmol/L Final   09/06/2022 11 5 - 18 mmol/L Final   03/07/2021 4 3 - 14 mmol/L Final     Glucose   Date Value Ref Range Status   06/20/2024 88 70 - 99 mg/dL Final   09/06/2022 94 70 - 125 mg/dL Final   03/07/2021 99 70 - 99 mg/dL Final     GLUCOSE BY METER POCT   Date Value Ref Range Status   04/18/2024 89 70 - 99 mg/dL Final     Urea Nitrogen   Date Value Ref Range Status    06/20/2024 16.3 6.0 - 20.0 mg/dL Final   09/06/2022 22 8 - 22 mg/dL Final   03/07/2021 12 7 - 30 mg/dL Final     Creatinine   Date Value Ref Range Status   06/20/2024 0.71 0.51 - 0.95 mg/dL Final   03/07/2021 0.62 0.52 - 1.04 mg/dL Final     GFR Estimate   Date Value Ref Range Status   06/20/2024 >90 >60 mL/min/1.73m2 Final     Comment:     eGFR calculated using 2021 CKD-EPI equation.   03/07/2021 >90 >60 mL/min/[1.73_m2] Final     Comment:     Non  GFR Calc  Starting 12/18/2018, serum creatinine based estimated GFR (eGFR) will be   calculated using the Chronic Kidney Disease Epidemiology Collaboration   (CKD-EPI) equation.       Calcium   Date Value Ref Range Status   06/20/2024 9.7 8.6 - 10.0 mg/dL Final   03/07/2021 9.4 8.5 - 10.1 mg/dL Final     Bilirubin Total   Date Value Ref Range Status   04/27/2024 1.4 (H) <=1.2 mg/dL Final   03/07/2021 1.1 0.2 - 1.3 mg/dL Final     Alkaline Phosphatase   Date Value Ref Range Status   04/27/2024 63 40 - 150 U/L Final     Comment:     Reference intervals for this test were updated on 11/14/2023 to more accurately reflect our healthy population. There may be differences in the flagging of prior results with similar values performed with this method. Interpretation of those prior results can be made in the context of the updated reference intervals.   03/07/2021 57 40 - 150 U/L Final     ALT   Date Value Ref Range Status   04/27/2024 13 0 - 50 U/L Final     Comment:     Reference intervals for this test were updated on 6/12/2023 to more accurately reflect our healthy population. There may be differences in the flagging of prior results with similar values performed with this method. Interpretation of those prior results can be made in the context of the updated reference intervals.     03/07/2021 17 0 - 50 U/L Final     AST   Date Value Ref Range Status   04/27/2024 32 0 - 45 U/L Final     Comment:     Reference intervals for this test were updated on  6/12/2023 to more accurately reflect our healthy population. There may be differences in the flagging of prior results with similar values performed with this method. Interpretation of those prior results can be made in the context of the updated reference intervals.   03/07/2021 18 0 - 45 U/L Final       Lab Results   Component Value Date    WBC 4.1 05/01/2024    WBC 7.3 03/07/2021     Lab Results   Component Value Date    RBC 5.54 05/01/2024    RBC 4.91 03/07/2021     Lab Results   Component Value Date    HGB 14.9 05/01/2024    HGB 12.5 03/07/2021     Lab Results   Component Value Date    HCT 48.9 05/01/2024    HCT 42.8 03/07/2021     Lab Results   Component Value Date    MCV 88 05/01/2024    MCV 87 03/07/2021     Lab Results   Component Value Date    MCH 26.9 05/01/2024    MCH 25.5 03/07/2021     Lab Results   Component Value Date    MCHC 30.5 05/01/2024    MCHC 29.2 03/07/2021     Lab Results   Component Value Date    RDW 16.9 05/01/2024    RDW 15.6 03/07/2021     Lab Results   Component Value Date     05/01/2024     03/08/2021       INR   Date Value Ref Range Status   04/22/2024 1.35 (H) 0.85 - 1.15 Final   04/19/2013 0.96 0.86 - 1.14 Final        MELD 3.0: 13 at 4/22/2024  7:17 AM  MELD-Na: 12 at 4/22/2024  7:17 AM  Calculated from:  Serum Creatinine: 0.62 mg/dL (Using min of 1 mg/dL) at 4/22/2024  7:17 AM  Serum Sodium: 137 mmol/L at 4/22/2024  7:17 AM  Total Bilirubin: 1.5 mg/dL at 4/22/2024  7:17 AM  Serum Albumin: 2.9 g/dL at 4/22/2024  7:17 AM  INR(ratio): 1.35 at 4/22/2024  7:17 AM  Age at listing (hypothetical): 48 years  Sex: Female at 4/22/2024  7:17 AM      Previous work-up:   Lab Results   Component Value Date    HEPBANG Nonreactive 04/18/2024    HBCAB Nonreactive 04/18/2024    AUSAB 23.40 04/18/2024    HCVAB Nonreactive 04/18/2024    TSH 1.52 04/07/2022    CHOL 93 09/27/2023    HDL 28 (L) 09/27/2023    LDL 42 09/27/2023    TRIG 116 09/27/2023    A1C 5.7 (H) 02/13/2020      No results  "found for: \"SPECDES\", \"LDRESULTS\"    Imaging: Reviewed in EHR     Endoscopy: no upper endoscopy    Independently reviewed labs and imaging.     Assessment/Plan: Ms. Enamorado is a 48-year-old with a history of obesity, NARCISA, diastolic heart failure with pulmonary hypertension, who presents for evaluation of abnormal liver imaging.    She has risk factors for metabolic dysfunction associated steatotic liver disease, also at risk for liver dysfunction related to chronic diastolic heart failure pulmonary hypertension.  Discussed that patients with congestive hepatopathy can have a lobular appearing liver on imaging without clear cirrhosis on biopsy.  Discussed if patients have cirrhosis they should undergo liver cancer screening.  It is reasonable to treat her like she has cirrhosis if she wishes to avoid a biopsy.  If her imaging does not clearly show cirrhosis, may need to reconsider this.  Discussed I would continue to abstain from alcohol as she is doing.  Would also agree with starting that bound to help with weight loss and treat her metabolic liver disease.  Agree with working with her cardiology team and optimizing her volume overload, discussed elevated pulmonary pressures leading to hepatic congestion could accelerate liver damage.    -Right upper quadrant ultrasound now, then ultrasound and labs every 6 months    No orders of the defined types were placed in this encounter.    RTC 6 months.      Cyndie Joyner MD MS  Hepatology/Liver Transplant  [facility]    "

## 2024-07-05 NOTE — NURSING NOTE
Current patient location: [address] [facility] [address]    Is the patient currently in the state of [place]? YES    Visit mode:VIDEO    If the visit is dropped, the patient can be reconnected by: VIDEO VISIT: Send to e-mail at: [email or web address]    Will anyone else be joining the visit? NO  (If patient encounters technical issues they should call [phone]    How would you like to obtain your AVS? MyChart    Are changes needed to the allergy or medication list? No    Are refills needed on medications prescribed by this physician? NO    Reason for visit: Consult    Swetha BIANCHI

## 2024-07-05 NOTE — PROGRESS NOTES
Virtual Visit Details    Type of service:  Video Visit   Video Start Time: 11:01 AM  Video End Time:11:16 AM    Originating Location (pt. Location): Home  Distant Location (provider location):  On-site  Platform used for Video Visit: [facility] New Patient Visit    Date of Visit: July 5, 2024    Reason for referral: Evaluate liver disease    Subjective: Ms. Enamorado is a 48-year-old with a history of obesity, NARCISA, diastolic heart failure with pulmonary hypertension, who presents for evaluation of abnormal liver imaging.    Had hepatomegaly and steatosis seen on imaging the past.  More recently in 2024 she had CT scan that showed a lobular appearing liver.  No signs of splenomegaly.  No other signs of portal hypertension.    She does not drink alcohol.  Testing for viral hepatitis is negative.    She does not have diabetes but has prediabetes.  She was prescribed Zepbound, has not started it yet.    No previous known liver disease, abnormal LFTs, imaging tests. No known history of liver decompensation    ROS: 14 point ROS negative except for positives noted in HPI.    PMHx:  Past Medical History:   Diagnosis Date    Acute on chronic diastolic congestive heart failure (H) 02/09/2022    Arthritis 2019    Hips    ASCUS favor benign 08/2015    Neg HPV    Depressive disorder 2010    H/O colposcopy with cervical biopsy 09/14/2015    Bx & ECC - negative    Hypertension 2019    LSIL on Pap smear 07/2014    Neg high risk HPV    Multiple sclerosis (H) 08/29/2005 9/18/200pt dx with MS 2004--dx by neurolgist and is followed by Dr. Harris    Myocardial infarction (H)     Obese     Pneumonia due to infectious organism, unspecified laterality, unspecified part of lung 02/08/2022    Sepsis (Temp 101, , WBC 13.0) 10/23/2018    Shingles 10/2016    SIRS (systemic inflammatory response syndrome) (H) 03/04/2021    Sleep apnea     UNSPEC CONSTIPATION 09/18/2006 9/18/2006   Has tried otc  suppository and otc lax.    PreDM  PCOS    PSHx:  Past Surgical History:   Procedure Laterality Date    CHOLECYSTECTOMY, LAPOROSCOPIC      Cholecystectomy, Laparoscopic    COLONOSCOPY  2007?    Bathroom issue..results normal    COMBINED CYSTOSCOPY, RETROGRADES, EXCHANGE STENT URETER(S) Right 2/21/2022    Procedure: CYSTOSCOPY, WITH right RETROGRADE PYELOGRAM AND right URETERAL STENT PLACEMENT;  Surgeon: Doyle Palomares MD;  [place] [facility] [place]    OPEN REDUCTION INTERNAL FIXATION TOE(S)  4/13/2012    Procedure:OPEN REDUCTION INTERNAL FIXATION TOE(S); Open reduction internal fixation right proximal fifth metatarsal fracture-   Anes-choice block; Surgeon:LEY, JEFFREY DUANE; Location:[place] OR    PICC SINGLE LUMEN PLACEMENT  3/20/2024    PICC TRIPLE LUMEN PLACEMENT  2/21/2022            FamHx:  Family History   Problem Relation Age of Onset    Neurologic Disorder Father         MS    Heart Disease Father         irregular heart rate    Diabetes Father     Melanoma Father     Sleep Apnea Father     Other Cancer Father     Cancer Maternal Grandfather         lung    Other Cancer Maternal Grandfather     Cancer Paternal Grandmother         stomach    Other Cancer Paternal Grandmother     Cancer Mother     Other Cancer Mother     Thyroid Disease Mother     Gynecology Maternal Aunt         PCOS    Hypertension Maternal Grandmother     Anxiety Disorder Maternal Grandmother     Thyroid Disease Maternal Grandmother     Anxiety Disorder Sister      No family history of liver disease, liver cancer    SocHx:  Social History     Socioeconomic History    Marital status: Single     Spouse name: Not on file    Number of children: Not on file    Years of education: Not on file    Highest education level: Not on file   Occupational History     Employer: [employer]   Tobacco Use    Smoking status: Never    Smokeless tobacco: Never   Vaping Use    Vaping status: Never Used   Substance and Sexual Activity    Alcohol use: Yes      Comment: social    Drug use: No    Sexual activity: Not Currently     Partners: Male     Birth control/protection: Pill   Other Topics Concern    Parent/sibling w/ CABG, MI or angioplasty before 65F 55M? No   Social History Narrative    , 3rd-5th grade     Social Determinants of Health     Financial Resource Strain: Low Risk  (9/25/2023)    Financial Resource Strain     Within the past 12 months, have you or your family members you live with been unable to get utilities (heat, electricity) when it was really needed?: No   Food Insecurity: Low Risk  (3/21/2024)    Food Insecurity     Within the past 12 months, did you worry that your food would run out before you got money to buy more?: No     Within the past 12 months, did the food you bought just not last and you didn t have money to get more?: No   Transportation Needs: Low Risk  (3/21/2024)    Transportation Needs     Within the past 12 months, has lack of transportation kept you from medical appointments, getting your medicines, non-medical meetings or appointments, work, or from getting things that you need?: No   Physical Activity: Inactive (3/21/2024)    Exercise Vital Sign     Days of Exercise per Week: 0 days     Minutes of Exercise per Session: 0 min   Stress: No Stress Concern Present (12/4/2020)    [place] [employer] - Occupational Stress Questionnaire     Feeling of Stress : Only a little   Social Connections: Unknown (3/1/2022)    Social Connection and Isolation Panel [NHANES]     Frequency of Communication with Friends and Family: Twice a week     Frequency of Social Gatherings with Friends and Family: Twice a week     Attends [employer] Services: Not on file     Active Member of Clubs or Organizations: Not on file     Attends Club or Organization Meetings: Not on file     Marital Status: Not on file   Interpersonal Safety: Low Risk  (9/27/2023)    Interpersonal Safety     Do you feel physically and  emotionally safe where you currently live?: Yes     Within the past 12 months, have you been hit, slapped, kicked or otherwise physically hurt by someone?: No     Within the past 12 months, have you been humiliated or emotionally abused in other ways by your partner or ex-partner?: No   Housing Stability: Low Risk  (3/21/2024)    Housing Stability     Do you have housing? : Yes     Are you worried about losing your housing?: No   Rare alcohol use - last November       Medications:  Current Outpatient Medications   Medication Sig Dispense Refill    acetaminophen (TYLENOL) 650 MG CR tablet Take 650 mg by mouth every 8 hours as needed for mild pain or fever      aspirin (ASPIRIN LOW DOSE) 81 MG EC tablet Take 1 tablet (81 mg) by mouth daily      bumetanide (BUMEX) 2 MG tablet Take 2 tablets (4 mg) by mouth 2 times daily 120 tablet 2    empagliflozin (JARDIANCE) 10 MG TABS tablet Take 1 tablet (10 mg) by mouth daily 90 tablet 1    metoprolol succinate ER (TOPROL XL) 25 MG 24 hr tablet Take 1 tablet (25 mg) by mouth daily 90 tablet 3    potassium chloride sheila ER (KLOR-CON M20) 20 MEQ CR tablet Take 1 tablet (20 mEq) by mouth 2 times daily 180 tablet 3    sertraline (ZOLOFT) 100 MG tablet Take 1 tablet (100 mg) by mouth daily 90 tablet 3    spironolactone (ALDACTONE) 25 MG tablet Take 0.5 tablets (12.5 mg) by mouth daily for 30 days 15 tablet 0    Emollient (GOLD BOND MEDICATED BODY EX) Externally apply 1 Application topically 2 times daily as needed      miconazole (MICATIN) 2 % AERP powder Apply topically 2 times daily as needed 128 g 3    tirzepatide-Weight Management (ZEPBOUND) 2.5 MG/0.5ML prefilled pen Inject 0.5 mLs (2.5 mg) Subcutaneous every 7 days (Patient not taking: Reported on 7/5/2024) 2 mL 0     No current facility-administered medications for this visit.     No OTCs, herbals    Allergies:  Allergies   Allergen Reactions    Nkda [No Known Drug Allergy]        Objective:  LMP 06/04/2024 (Exact Date)    Constitutional: pleasant woman in NAD  Eyes: non icteric  Respiratory: Normal respiratory excursion   MSK: normal range of motion of visualized extremities  Abd: Non distended  Skin: No jaundice  Psychiatric: normal mood and orientation    Labs:  Last Comprehensive Metabolic Panel:  Sodium   Date Value Ref Range Status   06/20/2024 138 135 - 145 mmol/L Final     Comment:     Reference intervals for this test were updated on 09/26/2023 to more accurately reflect our healthy population. There may be differences in the flagging of prior results with similar values performed with this method. Interpretation of those prior results can be made in the context of the updated reference intervals.    03/07/2021 139 133 - 144 mmol/L Final     Potassium   Date Value Ref Range Status   06/20/2024 3.4 3.4 - 5.3 mmol/L Final   09/06/2022 3.6 3.5 - 5.0 mmol/L Final   03/07/2021 4.3 3.4 - 5.3 mmol/L Final     Chloride   Date Value Ref Range Status   06/20/2024 100 98 - 107 mmol/L Final   09/06/2022 98 98 - 107 mmol/L Final   03/07/2021 105 94 - 109 mmol/L Final     Carbon Dioxide   Date Value Ref Range Status   03/07/2021 30 20 - 32 mmol/L Final     Carbon Dioxide (CO2)   Date Value Ref Range Status   06/20/2024 23 22 - 29 mmol/L Final   09/06/2022 25 22 - 31 mmol/L Final     Anion Gap   Date Value Ref Range Status   06/20/2024 15 7 - 15 mmol/L Final   09/06/2022 11 5 - 18 mmol/L Final   03/07/2021 4 3 - 14 mmol/L Final     Glucose   Date Value Ref Range Status   06/20/2024 88 70 - 99 mg/dL Final   09/06/2022 94 70 - 125 mg/dL Final   03/07/2021 99 70 - 99 mg/dL Final     GLUCOSE BY METER POCT   Date Value Ref Range Status   04/18/2024 89 70 - 99 mg/dL Final     Urea Nitrogen   Date Value Ref Range Status   06/20/2024 16.3 6.0 - 20.0 mg/dL Final   09/06/2022 22 8 - 22 mg/dL Final   03/07/2021 12 7 - 30 mg/dL Final     Creatinine   Date Value Ref Range Status   06/20/2024 0.71 0.51 - 0.95 mg/dL Final   03/07/2021 0.62 0.52 - 1.04  mg/dL Final     GFR Estimate   Date Value Ref Range Status   06/20/2024 >90 >60 mL/min/1.73m2 Final     Comment:     eGFR calculated using 2021 CKD-EPI equation.   03/07/2021 >90 >60 mL/min/[1.73_m2] Final     Comment:     Non  GFR Calc  Starting 12/18/2018, serum creatinine based estimated GFR (eGFR) will be   calculated using the Chronic Kidney Disease Epidemiology Collaboration   (CKD-EPI) equation.       Calcium   Date Value Ref Range Status   06/20/2024 9.7 8.6 - 10.0 mg/dL Final   03/07/2021 9.4 8.5 - 10.1 mg/dL Final     Bilirubin Total   Date Value Ref Range Status   04/27/2024 1.4 (H) <=1.2 mg/dL Final   03/07/2021 1.1 0.2 - 1.3 mg/dL Final     Alkaline Phosphatase   Date Value Ref Range Status   04/27/2024 63 40 - 150 U/L Final     Comment:     Reference intervals for this test were updated on 11/14/2023 to more accurately reflect our healthy population. There may be differences in the flagging of prior results with similar values performed with this method. Interpretation of those prior results can be made in the context of the updated reference intervals.   03/07/2021 57 40 - 150 U/L Final     ALT   Date Value Ref Range Status   04/27/2024 13 0 - 50 U/L Final     Comment:     Reference intervals for this test were updated on 6/12/2023 to more accurately reflect our healthy population. There may be differences in the flagging of prior results with similar values performed with this method. Interpretation of those prior results can be made in the context of the updated reference intervals.     03/07/2021 17 0 - 50 U/L Final     AST   Date Value Ref Range Status   04/27/2024 32 0 - 45 U/L Final     Comment:     Reference intervals for this test were updated on 6/12/2023 to more accurately reflect our healthy population. There may be differences in the flagging of prior results with similar values performed with this method. Interpretation of those prior results can be made in the context of  "the updated reference intervals.   03/07/2021 18 0 - 45 U/L Final       Lab Results   Component Value Date    WBC 4.1 05/01/2024    WBC 7.3 03/07/2021     Lab Results   Component Value Date    RBC 5.54 05/01/2024    RBC 4.91 03/07/2021     Lab Results   Component Value Date    HGB 14.9 05/01/2024    HGB 12.5 03/07/2021     Lab Results   Component Value Date    HCT 48.9 05/01/2024    HCT 42.8 03/07/2021     Lab Results   Component Value Date    MCV 88 05/01/2024    MCV 87 03/07/2021     Lab Results   Component Value Date    MCH 26.9 05/01/2024    MCH 25.5 03/07/2021     Lab Results   Component Value Date    MCHC 30.5 05/01/2024    MCHC 29.2 03/07/2021     Lab Results   Component Value Date    RDW 16.9 05/01/2024    RDW 15.6 03/07/2021     Lab Results   Component Value Date     05/01/2024     03/08/2021       INR   Date Value Ref Range Status   04/22/2024 1.35 (H) 0.85 - 1.15 Final   04/19/2013 0.96 0.86 - 1.14 Final        MELD 3.0: 13 at 4/22/2024  7:17 AM  MELD-Na: 12 at 4/22/2024  7:17 AM  Calculated from:  Serum Creatinine: 0.62 mg/dL (Using min of 1 mg/dL) at 4/22/2024  7:17 AM  Serum Sodium: 137 mmol/L at 4/22/2024  7:17 AM  Total Bilirubin: 1.5 mg/dL at 4/22/2024  7:17 AM  Serum Albumin: 2.9 g/dL at 4/22/2024  7:17 AM  INR(ratio): 1.35 at 4/22/2024  7:17 AM  Age at listing (hypothetical): 48 years  Sex: Female at 4/22/2024  7:17 AM      Previous work-up:   Lab Results   Component Value Date    HEPBANG Nonreactive 04/18/2024    HBCAB Nonreactive 04/18/2024    AUSAB 23.40 04/18/2024    HCVAB Nonreactive 04/18/2024    TSH 1.52 04/07/2022    CHOL 93 09/27/2023    HDL 28 (L) 09/27/2023    LDL 42 09/27/2023    TRIG 116 09/27/2023    A1C 5.7 (H) 02/13/2020      No results found for: \"SPECDES\", \"LDRESULTS\"    Imaging: Reviewed in EHR     Endoscopy: no upper endoscopy    Independently reviewed labs and imaging.     Assessment/Plan: Ms. Enamorado is a 48-year-old with a history of obesity, NARCISA, diastolic heart " failure with pulmonary hypertension, who presents for evaluation of abnormal liver imaging.    She has risk factors for metabolic dysfunction associated steatotic liver disease, also at risk for liver dysfunction related to chronic diastolic heart failure pulmonary hypertension.  Discussed that patients with congestive hepatopathy can have a lobular appearing liver on imaging without clear cirrhosis on biopsy.  Discussed if patients have cirrhosis they should undergo liver cancer screening.  It is reasonable to treat her like she has cirrhosis if she wishes to avoid a biopsy.  If her imaging does not clearly show cirrhosis, may need to reconsider this.  Discussed I would continue to abstain from alcohol as she is doing.  Would also agree with starting that bound to help with weight loss and treat her metabolic liver disease.  Agree with working with her cardiology team and optimizing her volume overload, discussed elevated pulmonary pressures leading to hepatic congestion could accelerate liver damage.    -Right upper quadrant ultrasound now, then ultrasound and labs every 6 months    No orders of the defined types were placed in this encounter.    RTC 6 months.      Cyndie Joyner MD MS  Hepatology/Liver Transplant  [facility]

## 2024-07-18 ENCOUNTER — APPOINTMENT (OUTPATIENT)
Dept: CT IMAGING | Facility: CLINIC | Age: 49
DRG: 871 | End: 2024-07-18
Attending: FAMILY MEDICINE
Payer: MEDICARE

## 2024-07-18 ENCOUNTER — APPOINTMENT (OUTPATIENT)
Dept: GENERAL RADIOLOGY | Facility: CLINIC | Age: 49
DRG: 871 | End: 2024-07-18
Attending: FAMILY MEDICINE
Payer: MEDICARE

## 2024-07-18 ENCOUNTER — HOSPITAL ENCOUNTER (INPATIENT)
Facility: CLINIC | Age: 49
LOS: 4 days | Discharge: HOME HEALTH | DRG: 871 | End: 2024-07-23
Attending: FAMILY MEDICINE | Admitting: INTERNAL MEDICINE
Payer: MEDICARE

## 2024-07-18 DIAGNOSIS — A41.9 SEPSIS DUE TO CELLULITIS (H): Primary | ICD-10-CM

## 2024-07-18 DIAGNOSIS — A41.9 SEPSIS, DUE TO UNSPECIFIED ORGANISM, UNSPECIFIED WHETHER ACUTE ORGAN DYSFUNCTION PRESENT (H): ICD-10-CM

## 2024-07-18 DIAGNOSIS — L03.90 SEPSIS DUE TO CELLULITIS (H): Primary | ICD-10-CM

## 2024-07-18 DIAGNOSIS — L30.4 INTERTRIGO: ICD-10-CM

## 2024-07-18 DIAGNOSIS — J96.01 ACUTE RESPIRATORY FAILURE WITH HYPOXIA (H): ICD-10-CM

## 2024-07-18 DIAGNOSIS — I50.9 ACUTE ON CHRONIC CONGESTIVE HEART FAILURE, UNSPECIFIED HEART FAILURE TYPE (H): ICD-10-CM

## 2024-07-18 DIAGNOSIS — N39.0 URINARY TRACT INFECTION WITHOUT HEMATURIA, SITE UNSPECIFIED: ICD-10-CM

## 2024-07-18 DIAGNOSIS — L03.116 CELLULITIS OF LEFT LOWER EXTREMITY: ICD-10-CM

## 2024-07-18 DIAGNOSIS — I50.32 CHRONIC HEART FAILURE WITH PRESERVED EJECTION FRACTION (H): ICD-10-CM

## 2024-07-18 LAB
ALBUMIN SERPL BCG-MCNC: 4 G/DL (ref 3.5–5.2)
ALBUMIN UR-MCNC: 100 MG/DL
ALP SERPL-CCNC: 83 U/L (ref 40–150)
ALT SERPL W P-5'-P-CCNC: 8 U/L (ref 0–50)
ANION GAP SERPL CALCULATED.3IONS-SCNC: 14 MMOL/L (ref 7–15)
APPEARANCE UR: ABNORMAL
AST SERPL W P-5'-P-CCNC: 34 U/L (ref 0–45)
BACTERIA #/AREA URNS HPF: ABNORMAL /HPF
BASE EXCESS BLDV CALC-SCNC: -0.5 MMOL/L (ref -3–3)
BASOPHILS # BLD AUTO: 0.1 10E3/UL (ref 0–0.2)
BASOPHILS NFR BLD AUTO: 1 %
BILIRUB SERPL-MCNC: 2.7 MG/DL
BILIRUB UR QL STRIP: NEGATIVE
BUN SERPL-MCNC: 13.7 MG/DL (ref 6–20)
CALCIUM SERPL-MCNC: 10.2 MG/DL (ref 8.8–10.4)
CHLORIDE SERPL-SCNC: 99 MMOL/L (ref 98–107)
COLOR UR AUTO: ABNORMAL
CREAT SERPL-MCNC: 0.73 MG/DL (ref 0.51–0.95)
EGFRCR SERPLBLD CKD-EPI 2021: >90 ML/MIN/1.73M2
EOSINOPHIL # BLD AUTO: 0.1 10E3/UL (ref 0–0.7)
EOSINOPHIL NFR BLD AUTO: 1 %
ERYTHROCYTE [DISTWIDTH] IN BLOOD BY AUTOMATED COUNT: 18.6 % (ref 10–15)
FLUAV RNA SPEC QL NAA+PROBE: NEGATIVE
FLUBV RNA RESP QL NAA+PROBE: NEGATIVE
GLUCOSE SERPL-MCNC: 99 MG/DL (ref 70–99)
GLUCOSE UR STRIP-MCNC: 50 MG/DL
HCO3 BLDV-SCNC: 27 MMOL/L (ref 21–28)
HCO3 SERPL-SCNC: 22 MMOL/L (ref 22–29)
HCT VFR BLD AUTO: 50.6 % (ref 35–47)
HGB BLD-MCNC: 15.9 G/DL (ref 11.7–15.7)
HGB UR QL STRIP: ABNORMAL
HOLD SPECIMEN: NORMAL
HOLD SPECIMEN: NORMAL
IMM GRANULOCYTES # BLD: 0 10E3/UL
IMM GRANULOCYTES NFR BLD: 0 %
KETONES UR STRIP-MCNC: NEGATIVE MG/DL
LACTATE SERPL-SCNC: 4.9 MMOL/L (ref 0.7–2)
LEUKOCYTE ESTERASE UR QL STRIP: ABNORMAL
LYMPHOCYTES # BLD AUTO: 0.8 10E3/UL (ref 0.8–5.3)
LYMPHOCYTES NFR BLD AUTO: 8 %
MCH RBC QN AUTO: 27.8 PG (ref 26.5–33)
MCHC RBC AUTO-ENTMCNC: 31.4 G/DL (ref 31.5–36.5)
MCV RBC AUTO: 89 FL (ref 78–100)
MONOCYTES # BLD AUTO: 0.5 10E3/UL (ref 0–1.3)
MONOCYTES NFR BLD AUTO: 5 %
MUCOUS THREADS #/AREA URNS LPF: PRESENT /LPF
NEUTROPHILS # BLD AUTO: 8.3 10E3/UL (ref 1.6–8.3)
NEUTROPHILS NFR BLD AUTO: 85 %
NITRATE UR QL: NEGATIVE
NRBC # BLD AUTO: 0 10E3/UL
NRBC BLD AUTO-RTO: 0 /100
O2/TOTAL GAS SETTING VFR VENT: 8 %
OXYHGB MFR BLDV: 29 % (ref 70–75)
PCO2 BLDV: 51 MM HG (ref 40–50)
PH BLDV: 7.33 [PH] (ref 7.32–7.43)
PH UR STRIP: 5 [PH] (ref 5–7)
PLATELET # BLD AUTO: 201 10E3/UL (ref 150–450)
PO2 BLDV: 21 MM HG (ref 25–47)
POTASSIUM SERPL-SCNC: 5.3 MMOL/L (ref 3.4–5.3)
PROT SERPL-MCNC: 9.3 G/DL (ref 6.4–8.3)
RBC # BLD AUTO: 5.72 10E6/UL (ref 3.8–5.2)
RBC URINE: 161 /HPF
RSV RNA SPEC NAA+PROBE: NEGATIVE
SAO2 % BLDV: 29.4 % (ref 70–75)
SARS-COV-2 RNA RESP QL NAA+PROBE: NEGATIVE
SODIUM SERPL-SCNC: 135 MMOL/L (ref 135–145)
SP GR UR STRIP: 1.02 (ref 1–1.03)
SQUAMOUS EPITHELIAL: 6 /HPF
UROBILINOGEN UR STRIP-MCNC: 2 MG/DL
WBC # BLD AUTO: 9.8 10E3/UL (ref 4–11)
WBC CLUMPS #/AREA URNS HPF: PRESENT /HPF
WBC URINE: >182 /HPF
YEAST #/AREA URNS HPF: ABNORMAL /HPF

## 2024-07-18 PROCEDURE — 94660 CPAP INITIATION&MGMT: CPT

## 2024-07-18 PROCEDURE — 87186 SC STD MICRODIL/AGAR DIL: CPT | Performed by: FAMILY MEDICINE

## 2024-07-18 PROCEDURE — 87149 DNA/RNA DIRECT PROBE: CPT | Performed by: FAMILY MEDICINE

## 2024-07-18 PROCEDURE — 250N000011 HC RX IP 250 OP 636: Performed by: FAMILY MEDICINE

## 2024-07-18 PROCEDURE — 5A09457 ASSISTANCE WITH RESPIRATORY VENTILATION, 24-96 CONSECUTIVE HOURS, CONTINUOUS POSITIVE AIRWAY PRESSURE: ICD-10-PCS | Performed by: STUDENT IN AN ORGANIZED HEALTH CARE EDUCATION/TRAINING PROGRAM

## 2024-07-18 PROCEDURE — 82805 BLOOD GASES W/O2 SATURATION: CPT | Performed by: FAMILY MEDICINE

## 2024-07-18 PROCEDURE — 85025 COMPLETE CBC W/AUTO DIFF WBC: CPT | Performed by: FAMILY MEDICINE

## 2024-07-18 PROCEDURE — 36415 COLL VENOUS BLD VENIPUNCTURE: CPT | Performed by: FAMILY MEDICINE

## 2024-07-18 PROCEDURE — 999N000215 HC STATISTIC HFNC ADULT NON-CPAP

## 2024-07-18 PROCEDURE — 96365 THER/PROPH/DIAG IV INF INIT: CPT | Mod: 59

## 2024-07-18 PROCEDURE — 93010 ELECTROCARDIOGRAM REPORT: CPT | Performed by: FAMILY MEDICINE

## 2024-07-18 PROCEDURE — 258N000003 HC RX IP 258 OP 636: Performed by: FAMILY MEDICINE

## 2024-07-18 PROCEDURE — 71045 X-RAY EXAM CHEST 1 VIEW: CPT

## 2024-07-18 PROCEDURE — 96367 TX/PROPH/DG ADDL SEQ IV INF: CPT

## 2024-07-18 PROCEDURE — 83605 ASSAY OF LACTIC ACID: CPT | Performed by: FAMILY MEDICINE

## 2024-07-18 PROCEDURE — 99285 EMERGENCY DEPT VISIT HI MDM: CPT | Mod: 25 | Performed by: FAMILY MEDICINE

## 2024-07-18 PROCEDURE — 84145 PROCALCITONIN (PCT): CPT | Performed by: INTERNAL MEDICINE

## 2024-07-18 PROCEDURE — 93005 ELECTROCARDIOGRAM TRACING: CPT

## 2024-07-18 PROCEDURE — 999N000185 HC STATISTIC TRANSPORT TIME EA 15 MIN

## 2024-07-18 PROCEDURE — 73701 CT LOWER EXTREMITY W/DYE: CPT | Mod: RT,MA

## 2024-07-18 PROCEDURE — 76604 US EXAM CHEST: CPT | Mod: 26 | Performed by: FAMILY MEDICINE

## 2024-07-18 PROCEDURE — 81001 URINALYSIS AUTO W/SCOPE: CPT | Performed by: FAMILY MEDICINE

## 2024-07-18 PROCEDURE — 87106 FUNGI IDENTIFICATION YEAST: CPT | Performed by: FAMILY MEDICINE

## 2024-07-18 PROCEDURE — 80053 COMPREHEN METABOLIC PANEL: CPT | Performed by: FAMILY MEDICINE

## 2024-07-18 PROCEDURE — 71275 CT ANGIOGRAPHY CHEST: CPT | Mod: MA

## 2024-07-18 PROCEDURE — 76604 US EXAM CHEST: CPT

## 2024-07-18 PROCEDURE — 99285 EMERGENCY DEPT VISIT HI MDM: CPT | Mod: 25

## 2024-07-18 PROCEDURE — 999N000157 HC STATISTIC RCP TIME EA 10 MIN

## 2024-07-18 PROCEDURE — 250N000009 HC RX 250: Performed by: FAMILY MEDICINE

## 2024-07-18 PROCEDURE — 87637 SARSCOV2&INF A&B&RSV AMP PRB: CPT | Performed by: FAMILY MEDICINE

## 2024-07-18 RX ORDER — IOPAMIDOL 755 MG/ML
146 INJECTION, SOLUTION INTRAVASCULAR ONCE
Status: COMPLETED | OUTPATIENT
Start: 2024-07-18 | End: 2024-07-18

## 2024-07-18 RX ORDER — PIPERACILLIN SODIUM, TAZOBACTAM SODIUM 3; .375 G/15ML; G/15ML
3.38 INJECTION, POWDER, LYOPHILIZED, FOR SOLUTION INTRAVENOUS EVERY 6 HOURS
Status: DISCONTINUED | OUTPATIENT
Start: 2024-07-18 | End: 2024-07-19

## 2024-07-18 RX ORDER — CEFAZOLIN SODIUM 1 G/50ML
1250 SOLUTION INTRAVENOUS EVERY 12 HOURS
Status: DISCONTINUED | OUTPATIENT
Start: 2024-07-18 | End: 2024-07-19

## 2024-07-18 RX ORDER — SODIUM CHLORIDE 9 MG/ML
1000 INJECTION, SOLUTION INTRAVENOUS CONTINUOUS
Status: DISCONTINUED | OUTPATIENT
Start: 2024-07-18 | End: 2024-07-19

## 2024-07-18 RX ORDER — CLINDAMYCIN PHOSPHATE 900 MG/50ML
900 INJECTION, SOLUTION INTRAVENOUS EVERY 8 HOURS
Status: DISCONTINUED | OUTPATIENT
Start: 2024-07-18 | End: 2024-07-19

## 2024-07-18 RX ADMIN — IOPAMIDOL 146 ML: 755 INJECTION, SOLUTION INTRAVENOUS at 22:53

## 2024-07-18 RX ADMIN — VANCOMYCIN HYDROCHLORIDE 1250 MG: 10 INJECTION, POWDER, LYOPHILIZED, FOR SOLUTION INTRAVENOUS at 23:50

## 2024-07-18 RX ADMIN — SODIUM CHLORIDE 100 ML: 9 INJECTION, SOLUTION INTRAVENOUS at 22:53

## 2024-07-18 RX ADMIN — SODIUM CHLORIDE 1500 ML: 9 INJECTION, SOLUTION INTRAVENOUS at 21:21

## 2024-07-18 RX ADMIN — PIPERACILLIN AND TAZOBACTAM 3.38 G: 3; .375 INJECTION, POWDER, FOR SOLUTION INTRAVENOUS at 21:22

## 2024-07-18 RX ADMIN — CLINDAMYCIN PHOSPHATE 900 MG: 900 INJECTION, SOLUTION INTRAVENOUS at 22:44

## 2024-07-18 ASSESSMENT — ENCOUNTER SYMPTOMS
FREQUENCY: 0
NAUSEA: 1
FEVER: 1
VOMITING: 0
SINUS PRESSURE: 0
DIARRHEA: 0
HEADACHES: 0
BLOOD IN STOOL: 0
SHORTNESS OF BREATH: 1
WHEEZING: 0
CHILLS: 1
PALPITATIONS: 0
COUGH: 0
DYSURIA: 0
DIAPHORESIS: 1
SORE THROAT: 0
CONSTIPATION: 0
ABDOMINAL PAIN: 0

## 2024-07-18 ASSESSMENT — ACTIVITIES OF DAILY LIVING (ADL)
ADLS_ACUITY_SCORE: 38

## 2024-07-18 ASSESSMENT — COLUMBIA-SUICIDE SEVERITY RATING SCALE - C-SSRS
2. HAVE YOU ACTUALLY HAD ANY THOUGHTS OF KILLING YOURSELF IN THE PAST MONTH?: NO
6. HAVE YOU EVER DONE ANYTHING, STARTED TO DO ANYTHING, OR PREPARED TO DO ANYTHING TO END YOUR LIFE?: NO
1. IN THE PAST MONTH, HAVE YOU WISHED YOU WERE DEAD OR WISHED YOU COULD GO TO SLEEP AND NOT WAKE UP?: NO

## 2024-07-19 ENCOUNTER — APPOINTMENT (OUTPATIENT)
Dept: PHYSICAL THERAPY | Facility: CLINIC | Age: 49
DRG: 871 | End: 2024-07-19
Payer: MEDICARE

## 2024-07-19 ENCOUNTER — APPOINTMENT (OUTPATIENT)
Dept: CARDIOLOGY | Facility: CLINIC | Age: 49
DRG: 871 | End: 2024-07-19
Attending: STUDENT IN AN ORGANIZED HEALTH CARE EDUCATION/TRAINING PROGRAM
Payer: MEDICARE

## 2024-07-19 ENCOUNTER — APPOINTMENT (OUTPATIENT)
Dept: GENERAL RADIOLOGY | Facility: CLINIC | Age: 49
DRG: 871 | End: 2024-07-19
Attending: INTERNAL MEDICINE
Payer: MEDICARE

## 2024-07-19 PROBLEM — N39.0 URINARY TRACT INFECTION WITHOUT HEMATURIA, SITE UNSPECIFIED: Status: ACTIVE | Noted: 2024-07-19

## 2024-07-19 PROBLEM — J96.01 ACUTE RESPIRATORY FAILURE WITH HYPOXIA (H): Status: ACTIVE | Noted: 2024-07-19

## 2024-07-19 PROBLEM — L03.116 CELLULITIS OF LEFT LOWER EXTREMITY: Status: ACTIVE | Noted: 2024-07-19

## 2024-07-19 LAB
ALBUMIN SERPL BCG-MCNC: 3.5 G/DL (ref 3.5–5.2)
ALLEN'S TEST: YES
ALP SERPL-CCNC: 74 U/L (ref 40–150)
ALT SERPL W P-5'-P-CCNC: 6 U/L (ref 0–50)
ANION GAP SERPL CALCULATED.3IONS-SCNC: 13 MMOL/L (ref 7–15)
AST SERPL W P-5'-P-CCNC: 20 U/L (ref 0–45)
BASE EXCESS BLDA CALC-SCNC: -1.1 MMOL/L (ref -3–3)
BILIRUB SERPL-MCNC: 3.5 MG/DL
BUN SERPL-MCNC: 13.8 MG/DL (ref 6–20)
CALCIUM SERPL-MCNC: 9.4 MG/DL (ref 8.8–10.4)
CHLORIDE SERPL-SCNC: 101 MMOL/L (ref 98–107)
COHGB MFR BLD: 97.3 % (ref 96–97)
CREAT SERPL-MCNC: 0.74 MG/DL (ref 0.51–0.95)
EGFRCR SERPLBLD CKD-EPI 2021: >90 ML/MIN/1.73M2
ENTEROCOCCUS FAECALIS: DETECTED
ENTEROCOCCUS FAECIUM: NOT DETECTED
ERYTHROCYTE [DISTWIDTH] IN BLOOD BY AUTOMATED COUNT: 18.2 % (ref 10–15)
GLUCOSE BLDC GLUCOMTR-MCNC: 101 MG/DL (ref 70–99)
GLUCOSE BLDC GLUCOMTR-MCNC: 104 MG/DL (ref 70–99)
GLUCOSE BLDC GLUCOMTR-MCNC: 109 MG/DL (ref 70–99)
GLUCOSE BLDC GLUCOMTR-MCNC: 111 MG/DL (ref 70–99)
GLUCOSE BLDC GLUCOMTR-MCNC: 97 MG/DL (ref 70–99)
GLUCOSE SERPL-MCNC: 89 MG/DL (ref 70–99)
HCO3 BLD-SCNC: 23 MMOL/L (ref 21–28)
HCO3 SERPL-SCNC: 22 MMOL/L (ref 22–29)
HCT VFR BLD AUTO: 46.7 % (ref 35–47)
HGB BLD-MCNC: 15.1 G/DL (ref 11.7–15.7)
LACTATE SERPL-SCNC: 1.1 MMOL/L (ref 0.7–2)
LACTATE SERPL-SCNC: 2.6 MMOL/L (ref 0.7–2)
LACTATE SERPL-SCNC: 3.2 MMOL/L (ref 0.7–2)
LISTERIA SPECIES (DETECTED/NOT DETECTED): NOT DETECTED
LVEF ECHO: NORMAL
MAGNESIUM SERPL-MCNC: 2.1 MG/DL (ref 1.7–2.3)
MCH RBC QN AUTO: 28.7 PG (ref 26.5–33)
MCHC RBC AUTO-ENTMCNC: 32.3 G/DL (ref 31.5–36.5)
MCV RBC AUTO: 89 FL (ref 78–100)
O2/TOTAL GAS SETTING VFR VENT: 60 %
PCO2 BLD: 35 MM HG (ref 35–45)
PEEP: 2 CM H2O
PH BLD: 7.42 [PH] (ref 7.35–7.45)
PHOSPHATE SERPL-MCNC: 2.9 MG/DL (ref 2.5–4.5)
PLATELET # BLD AUTO: 155 10E3/UL (ref 150–450)
PO2 BLD: 84 MM HG (ref 80–105)
POTASSIUM SERPL-SCNC: 3.9 MMOL/L (ref 3.4–5.3)
PROCALCITONIN SERPL IA-MCNC: 0.06 NG/ML
PROT SERPL-MCNC: 7.8 G/DL (ref 6.4–8.3)
RBC # BLD AUTO: 5.27 10E6/UL (ref 3.8–5.2)
SAO2 % BLDA: 96 % (ref 92–100)
SODIUM SERPL-SCNC: 136 MMOL/L (ref 135–145)
STAPHYLOCOCCUS AUREUS: NOT DETECTED
STAPHYLOCOCCUS EPIDERMIDIS: NOT DETECTED
STAPHYLOCOCCUS LUGDUNENSIS: NOT DETECTED
STAPHYLOCOCCUS SPECIES: NOT DETECTED
STREPTOCOCCUS AGALACTIAE: NOT DETECTED
STREPTOCOCCUS ANGINOSUS GROUP: NOT DETECTED
STREPTOCOCCUS PNEUMONIAE: NOT DETECTED
STREPTOCOCCUS PYOGENES: NOT DETECTED
STREPTOCOCCUS SPECIES: NOT DETECTED
WBC # BLD AUTO: 12.9 10E3/UL (ref 4–11)

## 2024-07-19 PROCEDURE — G0463 HOSPITAL OUTPT CLINIC VISIT: HCPCS | Mod: 25

## 2024-07-19 PROCEDURE — 94660 CPAP INITIATION&MGMT: CPT

## 2024-07-19 PROCEDURE — 83605 ASSAY OF LACTIC ACID: CPT | Performed by: STUDENT IN AN ORGANIZED HEALTH CARE EDUCATION/TRAINING PROGRAM

## 2024-07-19 PROCEDURE — 99222 1ST HOSP IP/OBS MODERATE 55: CPT | Mod: 95 | Performed by: INTERNAL MEDICINE

## 2024-07-19 PROCEDURE — 250N000009 HC RX 250: Performed by: INTERNAL MEDICINE

## 2024-07-19 PROCEDURE — 200N000001 HC R&B ICU

## 2024-07-19 PROCEDURE — 83605 ASSAY OF LACTIC ACID: CPT | Performed by: INTERNAL MEDICINE

## 2024-07-19 PROCEDURE — 250N000011 HC RX IP 250 OP 636: Performed by: STUDENT IN AN ORGANIZED HEALTH CARE EDUCATION/TRAINING PROGRAM

## 2024-07-19 PROCEDURE — 250N000011 HC RX IP 250 OP 636: Performed by: INTERNAL MEDICINE

## 2024-07-19 PROCEDURE — 272N000580 HC KIT, 4 OR 5FR DUAL LUMEN POWER PICC

## 2024-07-19 PROCEDURE — 83605 ASSAY OF LACTIC ACID: CPT | Performed by: FAMILY MEDICINE

## 2024-07-19 PROCEDURE — C8929 TTE W OR WO FOL WCON,DOPPLER: HCPCS

## 2024-07-19 PROCEDURE — 999N000157 HC STATISTIC RCP TIME EA 10 MIN

## 2024-07-19 PROCEDURE — 36415 COLL VENOUS BLD VENIPUNCTURE: CPT | Performed by: INTERNAL MEDICINE

## 2024-07-19 PROCEDURE — 93306 TTE W/DOPPLER COMPLETE: CPT | Mod: 26 | Performed by: INTERNAL MEDICINE

## 2024-07-19 PROCEDURE — 255N000002 HC RX 255 OP 636: Performed by: STUDENT IN AN ORGANIZED HEALTH CARE EDUCATION/TRAINING PROGRAM

## 2024-07-19 PROCEDURE — 3E033XZ INTRODUCTION OF VASOPRESSOR INTO PERIPHERAL VEIN, PERCUTANEOUS APPROACH: ICD-10-PCS | Performed by: INTERNAL MEDICINE

## 2024-07-19 PROCEDURE — 97161 PT EVAL LOW COMPLEX 20 MIN: CPT | Mod: GP

## 2024-07-19 PROCEDURE — 82805 BLOOD GASES W/O2 SATURATION: CPT | Performed by: INTERNAL MEDICINE

## 2024-07-19 PROCEDURE — 84100 ASSAY OF PHOSPHORUS: CPT | Performed by: INTERNAL MEDICINE

## 2024-07-19 PROCEDURE — 80053 COMPREHEN METABOLIC PANEL: CPT | Performed by: INTERNAL MEDICINE

## 2024-07-19 PROCEDURE — 36569 INSJ PICC 5 YR+ W/O IMAGING: CPT

## 2024-07-19 PROCEDURE — 999N000065 XR CHEST PORT 1 VIEW

## 2024-07-19 PROCEDURE — 36600 WITHDRAWAL OF ARTERIAL BLOOD: CPT

## 2024-07-19 PROCEDURE — 250N000013 HC RX MED GY IP 250 OP 250 PS 637: Performed by: INTERNAL MEDICINE

## 2024-07-19 PROCEDURE — 258N000003 HC RX IP 258 OP 636: Performed by: INTERNAL MEDICINE

## 2024-07-19 PROCEDURE — 99207 PR APP CREDIT; MD BILLING SHARED VISIT: CPT | Performed by: STUDENT IN AN ORGANIZED HEALTH CARE EDUCATION/TRAINING PROGRAM

## 2024-07-19 PROCEDURE — 83735 ASSAY OF MAGNESIUM: CPT | Performed by: INTERNAL MEDICINE

## 2024-07-19 PROCEDURE — 85027 COMPLETE CBC AUTOMATED: CPT | Performed by: INTERNAL MEDICINE

## 2024-07-19 PROCEDURE — 999N000208 ECHOCARDIOGRAM COMPLETE

## 2024-07-19 PROCEDURE — 999N000215 HC STATISTIC HFNC ADULT NON-CPAP

## 2024-07-19 RX ORDER — ASPIRIN 81 MG/1
81 TABLET ORAL DAILY
Status: DISCONTINUED | OUTPATIENT
Start: 2024-07-19 | End: 2024-07-23 | Stop reason: HOSPADM

## 2024-07-19 RX ORDER — ONDANSETRON 2 MG/ML
4 INJECTION INTRAMUSCULAR; INTRAVENOUS EVERY 6 HOURS PRN
Status: DISCONTINUED | OUTPATIENT
Start: 2024-07-19 | End: 2024-07-23 | Stop reason: HOSPADM

## 2024-07-19 RX ORDER — NYSTATIN 100000 U/G
CREAM TOPICAL 2 TIMES DAILY
Status: DISCONTINUED | OUTPATIENT
Start: 2024-07-19 | End: 2024-07-19

## 2024-07-19 RX ORDER — NOREPINEPHRINE BITARTRATE 0.02 MG/ML
.01-.6 INJECTION, SOLUTION INTRAVENOUS CONTINUOUS
Status: DISCONTINUED | OUTPATIENT
Start: 2024-07-19 | End: 2024-07-21

## 2024-07-19 RX ORDER — ACETAMINOPHEN 650 MG/20.3ML
650 LIQUID ORAL EVERY 4 HOURS PRN
Status: DISCONTINUED | OUTPATIENT
Start: 2024-07-19 | End: 2024-07-23 | Stop reason: HOSPADM

## 2024-07-19 RX ORDER — NICOTINE POLACRILEX 4 MG
15-30 LOZENGE BUCCAL
Status: DISCONTINUED | OUTPATIENT
Start: 2024-07-19 | End: 2024-07-23 | Stop reason: HOSPADM

## 2024-07-19 RX ORDER — SERTRALINE HYDROCHLORIDE 100 MG/1
100 TABLET, FILM COATED ORAL DAILY
Status: DISCONTINUED | OUTPATIENT
Start: 2024-07-19 | End: 2024-07-23 | Stop reason: HOSPADM

## 2024-07-19 RX ORDER — PROCHLORPERAZINE MALEATE 5 MG
10 TABLET ORAL EVERY 6 HOURS PRN
Status: DISCONTINUED | OUTPATIENT
Start: 2024-07-19 | End: 2024-07-23 | Stop reason: HOSPADM

## 2024-07-19 RX ORDER — NYSTATIN 100000 U/G
CREAM TOPICAL 2 TIMES DAILY
Status: DISCONTINUED | OUTPATIENT
Start: 2024-07-19 | End: 2024-07-23 | Stop reason: ALTCHOICE

## 2024-07-19 RX ORDER — DEXTROSE MONOHYDRATE 25 G/50ML
25-50 INJECTION, SOLUTION INTRAVENOUS
Status: DISCONTINUED | OUTPATIENT
Start: 2024-07-19 | End: 2024-07-23 | Stop reason: HOSPADM

## 2024-07-19 RX ORDER — SODIUM CHLORIDE 9 MG/ML
INJECTION, SOLUTION INTRAVENOUS CONTINUOUS
Status: DISCONTINUED | OUTPATIENT
Start: 2024-07-19 | End: 2024-07-23 | Stop reason: HOSPADM

## 2024-07-19 RX ORDER — PIPERACILLIN SODIUM, TAZOBACTAM SODIUM 3; .375 G/15ML; G/15ML
3.38 INJECTION, POWDER, LYOPHILIZED, FOR SOLUTION INTRAVENOUS EVERY 6 HOURS
Status: COMPLETED | OUTPATIENT
Start: 2024-07-19 | End: 2024-07-22

## 2024-07-19 RX ORDER — SODIUM CHLORIDE 9 MG/ML
1000 INJECTION, SOLUTION INTRAVENOUS CONTINUOUS
Status: ACTIVE | OUTPATIENT
Start: 2024-07-19 | End: 2024-07-19

## 2024-07-19 RX ORDER — CALCIUM CARBONATE 500 MG/1
1000 TABLET, CHEWABLE ORAL 4 TIMES DAILY PRN
Status: DISCONTINUED | OUTPATIENT
Start: 2024-07-19 | End: 2024-07-23 | Stop reason: HOSPADM

## 2024-07-19 RX ORDER — NICOTINE POLACRILEX 4 MG
15-30 LOZENGE BUCCAL
Status: DISCONTINUED | OUTPATIENT
Start: 2024-07-19 | End: 2024-07-19

## 2024-07-19 RX ORDER — CEFAZOLIN SODIUM 1 G/50ML
1250 SOLUTION INTRAVENOUS EVERY 12 HOURS
Status: DISCONTINUED | OUTPATIENT
Start: 2024-07-19 | End: 2024-07-22

## 2024-07-19 RX ORDER — CLINDAMYCIN PHOSPHATE 900 MG/50ML
900 INJECTION, SOLUTION INTRAVENOUS EVERY 8 HOURS
Status: DISCONTINUED | OUTPATIENT
Start: 2024-07-19 | End: 2024-07-19

## 2024-07-19 RX ORDER — LIDOCAINE 40 MG/G
CREAM TOPICAL
Status: ACTIVE | OUTPATIENT
Start: 2024-07-19 | End: 2024-07-22

## 2024-07-19 RX ORDER — SODIUM CHLORIDE, SODIUM LACTATE, POTASSIUM CHLORIDE, CALCIUM CHLORIDE 600; 310; 30; 20 MG/100ML; MG/100ML; MG/100ML; MG/100ML
INJECTION, SOLUTION INTRAVENOUS CONTINUOUS
Status: DISCONTINUED | OUTPATIENT
Start: 2024-07-19 | End: 2024-07-19

## 2024-07-19 RX ORDER — ACETAMINOPHEN 325 MG/1
650 TABLET ORAL EVERY 4 HOURS PRN
Status: DISCONTINUED | OUTPATIENT
Start: 2024-07-19 | End: 2024-07-23 | Stop reason: HOSPADM

## 2024-07-19 RX ORDER — CARBOXYMETHYLCELLULOSE SODIUM 5 MG/ML
1 SOLUTION/ DROPS OPHTHALMIC
Status: DISCONTINUED | OUTPATIENT
Start: 2024-07-19 | End: 2024-07-23 | Stop reason: HOSPADM

## 2024-07-19 RX ORDER — ONDANSETRON 4 MG/1
4 TABLET, ORALLY DISINTEGRATING ORAL EVERY 6 HOURS PRN
Status: DISCONTINUED | OUTPATIENT
Start: 2024-07-19 | End: 2024-07-23 | Stop reason: HOSPADM

## 2024-07-19 RX ORDER — ENOXAPARIN SODIUM 100 MG/ML
40 INJECTION SUBCUTANEOUS EVERY 12 HOURS
Status: DISCONTINUED | OUTPATIENT
Start: 2024-07-19 | End: 2024-07-23 | Stop reason: HOSPADM

## 2024-07-19 RX ORDER — DEXTROSE MONOHYDRATE 25 G/50ML
25-50 INJECTION, SOLUTION INTRAVENOUS
Status: DISCONTINUED | OUTPATIENT
Start: 2024-07-19 | End: 2024-07-19

## 2024-07-19 RX ORDER — PROCHLORPERAZINE 25 MG
25 SUPPOSITORY, RECTAL RECTAL EVERY 12 HOURS PRN
Status: DISCONTINUED | OUTPATIENT
Start: 2024-07-19 | End: 2024-07-23 | Stop reason: HOSPADM

## 2024-07-19 RX ADMIN — SERTRALINE HYDROCHLORIDE 100 MG: 100 TABLET ORAL at 09:45

## 2024-07-19 RX ADMIN — ACETAMINOPHEN 650 MG: 325 TABLET, FILM COATED ORAL at 02:04

## 2024-07-19 RX ADMIN — NOREPINEPHRINE BITARTRATE 0.03 MCG/KG/MIN: 0.02 INJECTION, SOLUTION INTRAVENOUS at 04:43

## 2024-07-19 RX ADMIN — PIPERACILLIN AND TAZOBACTAM 3.38 G: 3; .375 INJECTION, POWDER, FOR SOLUTION INTRAVENOUS at 02:09

## 2024-07-19 RX ADMIN — CLINDAMYCIN PHOSPHATE 900 MG: 900 INJECTION, SOLUTION INTRAVENOUS at 05:03

## 2024-07-19 RX ADMIN — PIPERACILLIN AND TAZOBACTAM 3.38 G: 3; .375 INJECTION, POWDER, FOR SOLUTION INTRAVENOUS at 21:20

## 2024-07-19 RX ADMIN — ENOXAPARIN SODIUM 40 MG: 40 INJECTION SUBCUTANEOUS at 13:36

## 2024-07-19 RX ADMIN — PIPERACILLIN AND TAZOBACTAM 3.38 G: 3; .375 INJECTION, POWDER, FOR SOLUTION INTRAVENOUS at 15:39

## 2024-07-19 RX ADMIN — VANCOMYCIN HYDROCHLORIDE 1250 MG: 5 INJECTION, POWDER, LYOPHILIZED, FOR SOLUTION INTRAVENOUS at 22:30

## 2024-07-19 RX ADMIN — NYSTATIN: 100000 CREAM TOPICAL at 09:12

## 2024-07-19 RX ADMIN — PIPERACILLIN AND TAZOBACTAM 3.38 G: 3; .375 INJECTION, POWDER, FOR SOLUTION INTRAVENOUS at 09:03

## 2024-07-19 RX ADMIN — ENOXAPARIN SODIUM 40 MG: 40 INJECTION SUBCUTANEOUS at 02:08

## 2024-07-19 RX ADMIN — NYSTATIN: 100000 CREAM TOPICAL at 21:20

## 2024-07-19 RX ADMIN — HUMAN ALBUMIN MICROSPHERES AND PERFLUTREN 4 ML: 10; .22 INJECTION, SOLUTION INTRAVENOUS at 15:31

## 2024-07-19 RX ADMIN — SODIUM CHLORIDE 500 ML: 9 INJECTION, SOLUTION INTRAVENOUS at 03:26

## 2024-07-19 RX ADMIN — NYSTATIN: 100000 CREAM TOPICAL at 03:27

## 2024-07-19 RX ADMIN — SODIUM CHLORIDE 10 ML/HR: 9 INJECTION, SOLUTION INTRAVENOUS at 01:56

## 2024-07-19 RX ADMIN — VANCOMYCIN HYDROCHLORIDE 1250 MG: 5 INJECTION, POWDER, LYOPHILIZED, FOR SOLUTION INTRAVENOUS at 09:41

## 2024-07-19 RX ADMIN — LIDOCAINE HYDROCHLORIDE ANHYDROUS 2 ML: 10 INJECTION, SOLUTION INFILTRATION at 11:40

## 2024-07-19 RX ADMIN — ASPIRIN 81 MG: 81 TABLET, COATED ORAL at 09:45

## 2024-07-19 RX ADMIN — NOREPINEPHRINE BITARTRATE 0.05 MCG/KG/MIN: 0.02 INJECTION, SOLUTION INTRAVENOUS at 13:32

## 2024-07-19 ASSESSMENT — ACTIVITIES OF DAILY LIVING (ADL)
ADLS_ACUITY_SCORE: 38
ADLS_ACUITY_SCORE: 35
ADLS_ACUITY_SCORE: 35
ADLS_ACUITY_SCORE: 36
ADLS_ACUITY_SCORE: 35
ADLS_ACUITY_SCORE: 33
ADLS_ACUITY_SCORE: 35
ADLS_ACUITY_SCORE: 36
ADLS_ACUITY_SCORE: 35
ADLS_ACUITY_SCORE: 33
ADLS_ACUITY_SCORE: 35
ADLS_ACUITY_SCORE: 35
ADLS_ACUITY_SCORE: 36
ADLS_ACUITY_SCORE: 36
ADLS_ACUITY_SCORE: 35
ADLS_ACUITY_SCORE: 36

## 2024-07-19 NOTE — MEDICATION SCRIBE - ADMISSION MEDICATION HISTORY
"Medication Scribe Admission Medication History    Admission medication history is complete. The information provided in this note is only as accurate as the sources available at the time of the update.    Information Source(s): Patient via in-person    Pertinent Information: Patient received a 30 day supply of Spironolactone in May 24' with no refills-stated she is not to continue this after completing course and has a \"few tablets left\"    Changes made to PTA medication list:  Added: Vagisil Powder PRN  Deleted: Zepbound ([place] denied with insurance)  Changed: None    Allergies reviewed with patient and updates made in EHR: yes    Medication History Completed By: Loan England 7/19/2024 9:10 AM    PTA Med List   Medication Sig Note Last Dose    acetaminophen (TYLENOL) 650 MG CR tablet Take 650 mg by mouth every 8 hours as needed for mild pain or fever  7/18/2024    aspirin (ASPIRIN LOW DOSE) 81 MG EC tablet Take 1 tablet (81 mg) by mouth daily  7/18/2024 at am    bumetanide (BUMEX) 2 MG tablet Take 2 tablets (4 mg) by mouth 2 times daily  7/17/2024 at pm    Emollient (GOLD BOND MEDICATED BODY EX) Externally apply 1 Application topically 2 times daily as needed  Unknown at prn    empagliflozin (JARDIANCE) 10 MG TABS tablet Take 1 tablet (10 mg) by mouth daily  7/18/2024 at am    metoprolol succinate ER (TOPROL XL) 25 MG 24 hr tablet Take 1 tablet (25 mg) by mouth daily  7/18/2024 at am    miconazole (MICATIN) 2 % AERP powder Apply topically 2 times daily as needed  7/18/2024 at prn    potassium chloride sheila ER (KLOR-CON M20) 20 MEQ CR tablet Take 1 tablet (20 mEq) by mouth 2 times daily  7/18/2024 at am    Pramoxine HCl (VAGISIL ANTI-ITCH MEDICATED EX) Externally apply topically as needed  7/18/2024    sertraline (ZOLOFT) 100 MG tablet Take 1 tablet (100 mg) by mouth daily  7/18/2024 at am    spironolactone (ALDACTONE) 25 MG tablet Take 0.5 tablets (12.5 mg) by mouth daily for 30 days 7/19/2024: \"Has a few left\" " Last filled 05/24 30 days 7/18/2024

## 2024-07-19 NOTE — PROGRESS NOTES
[facility]  Patient is currently open to home care services with [facility]. The patient has RN PT OT services.  [facility]  and team have been notified of patient admission.  NO Need to send a  new  referral thru the HUB.  [facility] liaison will continue to follow patient during stay.  If appropriate provide orders to resume home care at time of discharge.

## 2024-07-19 NOTE — ED PROVIDER NOTES
History     Chief Complaint   Patient presents with    Fever     Pt arrives via EMS stating 2 hours ago fever came on took tylenonol an hour ago and recheck of temp at 99.8. Pt came in on 8 L of O2 normally on 2 L of oxygen. Pt has some lympodema of left leg with drainage but primary concern today is sudden onset of fever.      HPI  Bess Enamorado is a 48 year old female who presents with abrupt onset of fever in the last 2 hours presenting here in significant distress hypoxic to low 80%.  Chronic hypoxia on home oxygen at 2 L.  Abrupt onset of chills and sweats.  Temp to 101.9 on arrival.  Initially tachypneic.  History of congestive heart failure.  Prior acute renal failure prior NSTEMI.  Prior PE not on anticoagulation.  Significant lymphedema lower extremities and secondary cellulitis of the lower extremities in the past.  Prior history of QTc prolongation.  Before this evening she was in her usual state of health.        Allergies:  Allergies   Allergen Reactions    Nkda [No Known Drug Allergy]        Problem List:    Patient Active Problem List    Diagnosis Date Noted    Cellulitis of abdominal wall 04/26/2024     Priority: Medium    Weakness 04/26/2024     Priority: Medium    Antibiotic-associated diarrhea 04/26/2024     Priority: Medium    Pulmonary hypertension (H) 04/18/2024     Priority: Medium    Acute and chronic respiratory failure with hypoxia (H) 04/18/2024     Priority: Medium    Sepsis due to cellulitis (H) 04/18/2024     Priority: Medium    Morbid obesity (H) 03/16/2024     Priority: Medium    Lower GI bleed 03/16/2024     Priority: Medium    Prolonged Q-T interval on ECG 03/16/2024     Priority: Medium    Left leg cellulitis 03/16/2024     Priority: Medium    Acute on chronic respiratory failure with hypoxemia (H) 03/16/2024     Priority: Medium    Acute on chronic congestive heart failure, unspecified heart failure type (H) 03/16/2024     Priority: Medium    Sepsis, due to unspecified organism,  unspecified whether acute organ dysfunction present (H) 03/16/2024     Priority: Medium    Obesity hypoventilation syndrome (H) 12/29/2023     Priority: Medium    Acute on chronic respiratory failure with hypoxia and hypercapnia (H) 12/26/2023     Priority: Medium    Pyelonephritis, acute 12/21/2023     Priority: Medium    Cellulitis of lower extremity, unspecified laterality 12/21/2023     Priority: Medium    Hypokalemia 05/04/2022     Priority: Medium    Recurrent UTI's 05/03/2022     Priority: Medium    Splenomegaly 04/06/2022     Priority: Medium    Hyponatremia 04/06/2022     Priority: Medium    Renal insufficiency 04/06/2022     Priority: Medium    Chronic heart failure with preserved ejection fraction (H) 03/09/2022     Priority: Medium    Ureteral stone 02/27/2022     Priority: Medium    Urinary tract infection 02/27/2022     Priority: Medium    Hydronephrosis with urinary obstruction due to ureteral calculus 02/27/2022     Priority: Medium    History of pulmonary embolism 02/24/2022     Priority: Medium    Dehydration 02/21/2022     Priority: Medium    Hyperkalemia 02/21/2022     Priority: Medium    Acute renal failure, unspecified acute renal failure type (H24) 02/21/2022     Priority: Medium    Hypotension due to hypovolemia 02/21/2022     Priority: Medium    Acute on chronic diastolic congestive heart failure (H) 02/09/2022     Priority: Medium    NSTEMI (non-ST elevated myocardial infarction) (H) 02/09/2022     Priority: Medium    Acute pulmonary embolism without acute cor pulmonale, unspecified pulmonary embolism type (H) 02/08/2022     Priority: Medium    Tachycardia 03/04/2021     Priority: Medium    Cellulitis of right leg 03/04/2021     Priority: Medium    Lymphedema of both lower extremities 06/15/2020     Priority: Medium    Generalized anxiety disorder 03/23/2020     Priority: Medium    History of abnormal cervical Pap smear 08/26/2019     Priority: Medium    Moderate episode of recurrent major  depressive disorder (H) 03/23/2018     Priority: Medium    NARCISA on CPAP 08/22/2017     Priority: Medium     Severe NARCISA with sleep-associated hypoxemia, with likely REM-related hypoventilation  Polysomnography - Test date 8/9/2017  AHI 45.9, basline SpO2 92.9%, mike SpO2 54.9%, time of SpO2 <= 88% of 31.7 minutes.  Severe desaturations and sustained hypoxemia confined to singular supine REM period (no lateral REM observed for comparison).  Also seen to have TCM increase by ~15 mmHg over baseline in supine REM, consistent with hypoventilation.  Pre-study VBG was WNL.  CPAP titrated to 10 cm H2O and appeared optimal, including supine REM, with normalization of SpO2 and TCM normalized to low-40's.  Seen to have frequent PLM's (64.3 / hour on diagnostic, 89.6 / hour on treatment, ~20% associated with cortical arousals).        Benign essential hypertension 02/15/2016     Priority: Medium    Peroneal tendon rupture 11/23/2015     Priority: Medium    Class 3 severe obesity due to excess calories with serious comorbidity and body mass index (BMI) of 50.0 to 59.9 in adult (H) 08/24/2015     Priority: Medium     Oct 2016:  Starting [employer] program in [place], goal to lose 140 lbs over next 2 years      Papanicolaou smear of cervix with low grade squamous intraepithelial lesion (LGSIL) 08/03/2014     Priority: Medium     7/29/2014:Pap--LSIL. Neg high risk HPV. Plan cotest in 1 year (if this is ASCUS or LSIL then colp). In reminders  8/20/15: Pap - ASCUS, Neg HPV. Plan colp  9/14/15: [place] Bx & ECC - negative. Plan cotest in 1 year.   10/20/16: NIL Pap, Neg HPV. Plan cotest in 3 years.       Eating disorder 08/25/2013     Priority: Medium     Binge-eating disorder; social phobia  See psychological assessment from [facility], Dr. Anupama Bowie, 8/14/2013  Problem list name updated by automated process. Provider to review      Leg edema 04/19/2013     Priority: Medium    Anxiety 06/28/2011     Priority: Medium      Followed by neurologist      Restless legs 06/28/2011     Priority: Medium    CARDIOVASCULAR SCREENING; LDL GOAL LESS THAN 160 10/31/2010     Priority: Medium    Constipation 09/18/2006     Priority: Medium     9/18/2006    Has tried otc suppository and otc lax.   Problem list name updated by automated process. Provider to review      Multiple sclerosis (H) 08/29/2005     Priority: Medium     9/18/200pt dx with MS 2004--dx by neurolgist and is followed by Dr. Jeet Nicholson, also MS nurse Heather Thomas      Polycystic ovaries 08/29/2005     Priority: Medium     Diagnosed in [place], had facial hair, overweight, carb craving, records being requested, never on metformin          Past Medical History:    Past Medical History:   Diagnosis Date    Acute on chronic diastolic congestive heart failure (H) 02/09/2022    Arthritis 2019    ASCUS favor benign 08/2015    Depressive disorder 2010    H/O colposcopy with cervical biopsy 09/14/2015    Hypertension 2019    LSIL on Pap smear 07/2014    Multiple sclerosis (H) 08/29/2005    Myocardial infarction (H)     Obese     Pneumonia due to infectious organism, unspecified laterality, unspecified part of lung 02/08/2022    Sepsis (Temp 101, , WBC 13.0) 10/23/2018    Shingles 10/2016    SIRS (systemic inflammatory response syndrome) (H) 03/04/2021    Sleep apnea     UNSPEC CONSTIPATION 09/18/2006       Past Surgical History:    Past Surgical History:   Procedure Laterality Date    CHOLECYSTECTOMY, LAPOROSCOPIC      Cholecystectomy, Laparoscopic    COLONOSCOPY  2007?    Bathroom issue..results normal    COMBINED CYSTOSCOPY, RETROGRADES, EXCHANGE STENT URETER(S) Right 2/21/2022    Procedure: CYSTOSCOPY, WITH right RETROGRADE PYELOGRAM AND right URETERAL STENT PLACEMENT;  Surgeon: Doyle Palomares MD;  Location: [facility]    OPEN REDUCTION INTERNAL FIXATION TOE(S)  4/13/2012    Procedure:OPEN REDUCTION INTERNAL FIXATION TOE(S); Open reduction internal fixation right  proximal fifth metatarsal fracture-   Anes-choice block; Surgeon:LEY, JEFFREY DUANE; Location:[facility] OR    PICC SINGLE LUMEN PLACEMENT  3/20/2024    PICC TRIPLE LUMEN PLACEMENT  2/21/2022            Family History:    Family History   Problem Relation Age of Onset    Neurologic Disorder Father         MS    Heart Disease Father         irregular heart rate    Diabetes Father     Melanoma Father     Sleep Apnea Father     Other Cancer Father     Cancer Maternal Grandfather         lung    Other Cancer Maternal Grandfather     Cancer Paternal Grandmother         stomach    Other Cancer Paternal Grandmother     Cancer Mother     Other Cancer Mother     Thyroid Disease Mother     Gynecology Maternal Aunt         PCOS    Hypertension Maternal Grandmother     Anxiety Disorder Maternal Grandmother     Thyroid Disease Maternal Grandmother     Anxiety Disorder Sister        Social History:  Marital Status:  Single [1]  Social History     Tobacco Use    Smoking status: Never    Smokeless tobacco: Never   Vaping Use    Vaping status: Never Used   Substance Use Topics    Alcohol use: Yes     Comment: social    Drug use: No        Medications:    acetaminophen (TYLENOL) 650 MG CR tablet  aspirin (ASPIRIN LOW DOSE) 81 MG EC tablet  bumetanide (BUMEX) 2 MG tablet  Emollient (GOLD BOND MEDICATED BODY EX)  empagliflozin (JARDIANCE) 10 MG TABS tablet  metoprolol succinate ER (TOPROL XL) 25 MG 24 hr tablet  miconazole (MICATIN) 2 % AERP powder  potassium chloride sheila ER (KLOR-CON M20) 20 MEQ CR tablet  sertraline (ZOLOFT) 100 MG tablet  spironolactone (ALDACTONE) 25 MG tablet  tirzepatide-Weight Management (ZEPBOUND) 2.5 MG/0.5ML prefilled pen          Review of Systems   Constitutional:  Positive for chills, diaphoresis and fever.   HENT:  Negative for ear pain, sinus pressure and sore throat.    Eyes:  Negative for visual disturbance.   Respiratory:  Positive for shortness of breath. Negative for cough and wheezing.    Cardiovascular:   Negative for chest pain and palpitations.   Gastrointestinal:  Positive for nausea. Negative for abdominal pain, blood in stool, constipation, diarrhea and vomiting.   Genitourinary:  Negative for dysuria, frequency and urgency.   Skin:  Negative for rash.   Neurological:  Negative for headaches.   All other systems reviewed and are negative.      Physical Exam   BP: (!) 142/107  Pulse: 114  Temp: (!) 101.9  F (38.8  C)  Resp: 22  SpO2: (!) 83 %      Physical Exam  Constitutional:       General: She is in acute distress.      Appearance: She is diaphoretic.   Eyes:      Conjunctiva/sclera: Conjunctivae normal.   Cardiovascular:      Rate and Rhythm: Tachycardia present.      Heart sounds: No murmur heard.  Pulmonary:      Effort: Pulmonary effort is normal. No respiratory distress.      Breath sounds: Normal breath sounds. No stridor. No wheezing or rhonchi.   Abdominal:      General: Abdomen is flat. There is no distension.      Palpations: There is no mass.      Tenderness: There is no abdominal tenderness. There is no guarding.   Musculoskeletal:      Cervical back: Neck supple.      Right lower leg: Edema (chronic) present.      Left lower leg: Edema present.   Skin:     Coloration: Skin is cyanotic (facial, hands - apparently chronic). Skin is not pale.      Findings: No rash.   Neurological:      Mental Status: She is alert.      Motor: No weakness.         The patient has significant lower extremity lymphedema changes down to approximately the level of the knee and cannot exclude that there is an underlying cellulitis here or that there could be a deep space infection and Tiffany's.   I see no obvious erythema but it is difficult to assess.      ED Course        Procedures                EKG Interpretation:      Interpreted by Spenser Turner MD  EKG done at's 2050 hrs. demonstrates a sinus rhythm at 111 bpm with a normal axis.  There is no ST change.  There is T wave inversion in the precordial leads.  This  "is also present in the inferior leads.  There is a rapid R progression consistent with right bundle branch block.  No ectopy.  Normal intervals with exception of a QRS of 104.  Impression sinus rhythm 111 bpm with a partial right bundle and no other significant acute changes other than T wave inversion in the precordial leads    Critical Care time:  none     The patient has signs of Severe Sepsis        If one the following conditions is present, a 30 mL/kg bolus is recommended as part of the 6 hour bundle (IBW can be used for BMI >30, or document refusal/contraindication):      1.   Initial hypotension  defined as 2 bps < 90 or map < 65 in the 6hrs before or 3hrs after time zero.     2.  Lactate >4.      The patient has signs of Severe Sepsis as evidenced by:    1. 2 SIRS criteria, AND  2. Suspected infection, AND   3. Organ dysfunction: Lactic Acidosis with value >2.0    Time severe sepsis diagnosis confirmed: 2024 07/19/24 as this was the time when Lactate resulted, and the level was > 2.0    3 Hour Severe Sepsis Bundle Completion:    1. Initial Lactic Acid Result:   Recent Labs   Lab Test 07/18/24 2024 04/26/24  1319 04/19/24  0532   LACT 4.9* 2.0 1.4     2. Blood Cultures before Antibiotics: Yes  3. Broad Spectrum Antibiotics Administered:  yes       Anti-infectives (From admission through now)      Start     Dose/Rate Route Frequency Ordered Stop    07/18/24 2200  vancomycin (VANCOCIN) 1,250 mg in sodium chloride 0.9 % 250 mL intermittent infusion         1,250 mg  over 90 Minutes Intravenous EVERY 12 HOURS 07/18/24 2053 07/18/24 2130  clindamycin (CLEOCIN) 900 mg in 50 mL D5W intermittent infusion         900 mg  100 mL/hr over 30 Minutes Intravenous EVERY 8 HOURS 07/18/24 2047 07/18/24 2045  piperacillin-tazobactam (ZOSYN) 3.375 g vial to attach to  mL bag        Note to Pharmacy: For [facility], [facility] and [facility]: For Zosyn-naive patients, use the \"Zosyn initial dose + extended infusion\" order panel. "    3.375 g  over 30 Minutes Intravenous EVERY 6 HOURS 07/18/24 2047              4. Is initial hypotension present?     No (IV fluid bolus NOT required). IV Fluid volume administered: 1500 ml 30 cc/kg                     Severe Sepsis reassessment:  1. Repeat Lactic Acid Level within 6 hours of time zero: pending  2. MAP>65 after initial IVF bolus, will continue to monitor fluid status and vital signs              Results for orders placed or performed during the hospital encounter of 07/18/24 (from the past 24 hour(s))   Extra Tube ([place] Draw) *Canceled*    Narrative    The following orders were created for panel order Extra Tube ([place] Draw).  Procedure                               Abnormality         Status                     ---------                               -----------         ------                       Please view results for these tests on the individual orders.   Blood gas venous   Result Value Ref Range    pH Venous 7.33 7.32 - 7.43    pCO2 Venous 51 (H) 40 - 50 mm Hg    pO2 Venous 21 (L) 25 - 47 mm Hg    Bicarbonate Venous 27 21 - 28 mmol/L    Base Excess/Deficit Venous -0.5 -3.0 - 3.0 mmol/L    FIO2 8     Oxyhemoglobin Venous 29 (L) 70 - 75 %    O2 Sat, Venous 29.4 (L) 70.0 - 75.0 %    Narrative    In healthy individuals, oxyhemoglobin (O2Hb) and oxygen saturation (SO2) are approximately equal. In the presence of dyshemoglobins, oxyhemoglobin can be considerably lower than oxygen saturation.   CBC with platelets, differential    Narrative    The following orders were created for panel order CBC with platelets, differential.  Procedure                               Abnormality         Status                     ---------                               -----------         ------                     CBC with platelets and d...[[id number]]  Abnormal            Final result                 Please view results for these tests on the individual orders.   Comprehensive metabolic panel   Result Value  Ref Range    Sodium 135 135 - 145 mmol/L    Potassium 5.3 3.4 - 5.3 mmol/L    Carbon Dioxide (CO2) 22 22 - 29 mmol/L    Anion Gap 14 7 - 15 mmol/L    Urea Nitrogen 13.7 6.0 - 20.0 mg/dL    Creatinine 0.73 0.51 - 0.95 mg/dL    GFR Estimate >90 >60 mL/min/1.73m2    Calcium 10.2 8.8 - 10.4 mg/dL    Chloride 99 98 - 107 mmol/L    Glucose 99 70 - 99 mg/dL    Alkaline Phosphatase 83 40 - 150 U/L    AST 34 0 - 45 U/L    ALT 8 0 - 50 U/L    Protein Total 9.3 (H) 6.4 - 8.3 g/dL    Albumin 4.0 3.5 - 5.2 g/dL    Bilirubin Total 2.7 (H) <=1.2 mg/dL   Lactic acid whole blood   Result Value Ref Range    Lactic Acid 4.9 (HH) 0.7 - 2.0 mmol/L   Extra Tube ([place] Draw)    Narrative    The following orders were created for panel order Extra Tube ([place] Draw).  Procedure                               Abnormality         Status                     ---------                               -----------         ------                     Extra Blood Culture Bottle[[id number]]                       Final result               Extra Blue Top Tube[[id number]]                              Final result                 Please view results for these tests on the individual orders.   CBC with platelets and differential   Result Value Ref Range    WBC Count 9.8 4.0 - 11.0 10e3/uL    RBC Count 5.72 (H) 3.80 - 5.20 10e6/uL    Hemoglobin 15.9 (H) 11.7 - 15.7 g/dL    Hematocrit 50.6 (H) 35.0 - 47.0 %    MCV 89 78 - 100 fL    MCH 27.8 26.5 - 33.0 pg    MCHC 31.4 (L) 31.5 - 36.5 g/dL    RDW 18.6 (H) 10.0 - 15.0 %    Platelet Count 201 150 - 450 10e3/uL    % Neutrophils 85 %    % Lymphocytes 8 %    % Monocytes 5 %    % Eosinophils 1 %    % Basophils 1 %    % Immature Granulocytes 0 %    NRBCs per 100 WBC 0 <1 /100    Absolute Neutrophils 8.3 1.6 - 8.3 10e3/uL    Absolute Lymphocytes 0.8 0.8 - 5.3 10e3/uL    Absolute Monocytes 0.5 0.0 - 1.3 10e3/uL    Absolute Eosinophils 0.1 0.0 - 0.7 10e3/uL    Absolute Basophils 0.1 0.0 - 0.2 10e3/uL    Absolute  Immature Granulocytes 0.0 <=0.4 10e3/uL    Absolute NRBCs 0.0 10e3/uL   Extra Blood Culture Bottle   Result Value Ref Range    Hold Specimen JIC    Extra Blue Top Tube   Result Value Ref Range    Hold Specimen jic    Symptomatic Influenza A/B, RSV, & SARS-CoV2 PCR (COVID-19) Nose    Specimen: Nose; Swab   Result Value Ref Range    Influenza A PCR Negative Negative    Influenza B PCR Negative Negative    RSV PCR Negative Negative    SARS CoV2 PCR Negative Negative    Narrative    Testing was performed using the Xpert Xpress CoV2/Flu/RSV Assay on the Cepheid GeneXpert Instrument. This test should be ordered for the detection of SARS-CoV-2, influenza, and RSV viruses in individuals who meet clinical and/or epidemiological criteria. Test performance is unknown in asymptomatic patients. This test is for in vitro diagnostic use under the FDA EUA for laboratories certified under CLIA to perform high or moderate complexity testing. This test has not been FDA cleared or approved. A negative result does not rule out the presence of PCR inhibitors in the specimen or target RNA in concentration below the limit of detection for the assay. If only one viral target is positive but coinfection with multiple targets is suspected, the sample should be re-tested with another FDA cleared, approved, or authorized test, if coinfection would change clinical management. This test was validated by the [facility]. These laboratories are certified under the Clinical Laboratory Improvement Amendments of 1988 (CLIA-88) as qualified to perform high complexity laboratory testing.   POC US CHEST B-SCAN    [facility] Procedure Note      Limited Bedside ED Cardiac Ultrasound:    PROCEDURE: PERFORMED BY: Dr. Spenser Turner MD  INDICATIONS/SYMPTOM:  Shortness of Breath  PROBE: Cardiac phased array probe  BODY LOCATION: Chest  FINDINGS:     Very difficult views due to body habitus.  I could visualize the IVC  and appears to be about 2+ cm dilated -and it collapses to about 1 cm.  Patient was partial cardiac walls and they appear to be hyperkinetic.                                             INTERPRETATION: Difficult views.  IVC does appear to be at least 2 cm dilated and collapses to about 1 cm in consistent likely with normovolemia at the start the remainder of the echocardiogram was difficult to interpret due to body habitus.    I had difficulty also visualizing the sliding lung sign.    IMAGE DOCUMENTATION: Images were archived to PACs system.         XR Chest Port 1 View    Narrative    EXAM: XR CHEST PORT 1 VIEW  LOCATION: [facility]  DATE: 7/18/2024    INDICATION: Respiratory Failure  COMPARISON: Chest CT 4/18/2024      Impression    IMPRESSION: Stable enlargement of the cardiac silhouette. Possible pulmonary vascular congestion without patricia consolidation, pleural effusion or pneumothorax. No acute bony abnormality.   UA with Microscopic reflex to Culture    Specimen: Urine, Catheter   Result Value Ref Range    Color Urine Yanet (A) Colorless, Straw, Light Yellow, Yellow    Appearance Urine Cloudy (A) Clear    Glucose Urine 50 (A) Negative mg/dL    Bilirubin Urine Negative Negative    Ketones Urine Negative Negative mg/dL    Specific Gravity Urine 1.024 1.003 - 1.035    Blood Urine Large (A) Negative    pH Urine 5.0 5.0 - 7.0    Protein Albumin Urine 100 (A) Negative mg/dL    Urobilinogen Urine 2.0 Normal, 2.0 mg/dL    Nitrite Urine Negative Negative    Leukocyte Esterase Urine Large (A) Negative    Bacteria Urine Few (A) None Seen /HPF    WBC Clumps Urine Present (A) None Seen /HPF    Budding Yeast Urine Moderate (A) None Seen /HPF    Mucus Urine Present (A) None Seen /LPF    RBC Urine 161 (H) <=2 /HPF    WBC Urine >182 (H) <=5 /HPF    Squamous Epithelials Urine 6 (H) <=1 /HPF    Narrative    Urine Culture ordered based on laboratory criteria   CT Femur Thigh Right w Contrast     Narrative    EXAM: CT FEMUR THIGH RIGHT W CONTRAST  LOCATION: [facility]  DATE: 7/18/2024    INDICATION: 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 Abrupt sepsis with extensive chronic lymphedema.  COMPARISON: Ultrasound venous bilateral lower extremity Doppler 3/16/2024.  TECHNIQUE: IV contrast. Axial, sagittal and coronal thin-section reconstruction. Dose reduction techniques were used.   CONTRAST: 146 mL Isovue-370 IV injected for CT pulmonary angiogram, CT abdomen and pelvis with IV contrast, no additional contrast injected for CT right femur.    FINDINGS:     BONES:  -Anatomic alignment of the right femur without fracture, dislocation, lytic or destructive process. Mild degenerative changes right hip and visualized knee joint.    SOFT TISSUES:  -Right ovarian fatty dermoid. Superficial soft tissue edema involving the ventral abdominal wall fat. Diffuse soft tissue edema involving the right thigh to the edge of field-of-view. No discrete fluid collection to suggest abscess formation. No opaque   foreign body or foci of soft tissue gas. Multiple superficial varicose veins. Partially visualized soft tissue edema involving the medial left thigh (please see separate CT left femur report).      Impression    IMPRESSION:  1.  Diffuse soft tissue edema involving the right thigh, more apparent distally. Partially visualized soft tissue edema involving the left thigh (please see separate CT left femur report). Superficial soft tissue edema involving the ventral abdominal   wall fat anteroinferiorly. No discrete fluid collection, opaque foreign body or soft tissue gas.    2.  Right ovarian fatty dermoid.     CT Femur Thigh Left with Contrast    Narrative    EXAM: CT FEMUR THIGH LEFT WITH CONTRAST  LOCATION: [facility]  DATE: 7/18/2024    INDICATION: Lower extremity swelling.  COMPARISON: None available.  TECHNIQUE: IV contrast. Axial, sagittal and coronal  thin-section reconstruction. Dose reduction techniques were used.   CONTRAST: 146 mL Isovue-370.    FINDINGS:     There is diffuse soft tissue swelling throughout the cutaneous and subcutaneous tissues of the visualized left thigh, greatest medially. This extends deep to abut the superficial fascia, without evidence of deep fascial involvement. Muscularity appears   normal in bulk and attenuation. No evidence of abscess or soft tissue gas. Of note, soft tissues is incompletely visualized distally into the lower leg, due to exclusion from field-of-view.    Milder soft tissue swelling is noted within the medial aspect of the right thigh.    No CT evidence of acute fracture or malalignment. Mild polyarticular osteoarthritic. No knee joint effusion.    No identifiable left inguinal lymphadenopathy. A large dermoid cyst is present within the right adnexa, measuring up to 9.3 cm and best appreciated on right thigh CT.      Impression    IMPRESSION:  1.  Diffuse soft tissue swelling throughout the cutaneous and subcutaneous tissues of the visualized left thigh, greatest medially. Findings may be attributable to a bland edema pattern or cellulitis. No evidence of abscess or soft tissue gas.  2.  Milder soft tissue swelling within the medial aspect of the right thigh.  3.  Large right adnexal dermoid cyst, measuring up to 9.3 cm. Gynecologic consultation is recommended.     CT Chest (PE) Abdomen Pelvis w Contrast    Narrative    EXAM: CT CHEST PE ABDOMEN PELVIS W CONTRAST  LOCATION: [facility]  DATE: 7/18/2024    INDICATION: Acute hypoxic respiratory failure and sepsis.  COMPARISON: Multiple priors with most recent 4/26/2024.  TECHNIQUE: CT chest pulmonary angiogram and routine CT abdomen pelvis with IV contrast. Arterial phase through the chest and venous phase through the abdomen and pelvis. Multiplanar reformats and MIP reconstructions were performed. Dose reduction   techniques were used.    CONTRAST: 146 mL Isovue 370    FINDINGS:  ANGIOGRAM CHEST: Enlargement of the central pulmonary arteries. No evidence for pulmonary embolism. Normal caliber thoracic aorta without dissection.    LUNGS AND PLEURA: Mosaic attenuation throughout the pulmonary parenchyma. No consolidation, pleural fluid or pneumothorax. Calcified granulomas bilaterally.    MEDIASTINUM/AXILLAE: Cardiac enlargement with biatrial enlargement. No pericardial fluid. No lymphadenopathy.    CORONARY ARTERY CALCIFICATION: None.    HEPATOBILIARY: Hepatomegaly. Diffuse hepatic steatosis. Suggestion of surface contour nodularity right hepatic lobe. Cholecystectomy. No biliary dilatation.    PANCREAS: Normal.    SPLEEN: Normal.    ADRENAL GLANDS: Normal.    KIDNEYS/BLADDER: Symmetric renal enhancement. Nonobstructing 3 mm calculus left upper renal pole. No urinary collecting system dilatation. Mild contrast excretion into the bladder. Bladder decompressed.    BOWEL: No obstruction or inflammatory change. Appendix unremarkable.    LYMPH NODES: Minimal interval increase in size of retroperitoneal lymph nodes in the upper abdomen. For example a lymph node in the periaortic space measures 0.8 cm, previously 0.6 cm. Multiple other lymph nodes also demonstrate a mild increase in size.    VASCULATURE: Normal caliber aorta. Minimal vascular calcification.    PELVIC ORGANS: Unchanged right adnexal dermoid. No surrounding inflammatory change. No free fluid.    MUSCULOSKELETAL: Skin thickening and subcutaneous edema involving the patient's anterior abdominal/pelvic wall. Degenerative hypertrophic changes in the spine.      Impression    IMPRESSION:  1.  No pulmonary embolism. Enlargement of central pulmonary arteries which can be seen with pulmonary arterial hypertension.    2.  Normal caliber aorta without dissection.    3.  Diffuse mosaic attenuation throughout the pulmonary parenchyma. These findings can be seen with air trapping related to reactive  airways disease or viral etiologies.    4.  Hepatomegaly with diffuse hepatic steatosis.    5.  Nonobstructing 3 mm calculus left upper renal pole.    6.  Unchanged right adnexal dermoid cyst. No free fluid or surrounding inflammatory change.     *Note: Due to a large number of results and/or encounters for the requested time period, some results have not been displayed. A complete set of results can be found in Results Review.       Medications   piperacillin-tazobactam (ZOSYN) 3.375 g vial to attach to  mL bag (3.375 g Intravenous $New Bag 7/18/24 2122)   clindamycin (CLEOCIN) 900 mg in 50 mL D5W intermittent infusion (has no administration in time range)   sodium chloride 0.9% BOLUS 1,500 mL (1,500 mLs Intravenous $New Bag 7/18/24 2121)   sodium chloride 0.9 % infusion (has no administration in time range)   vancomycin (VANCOCIN) 1,250 mg in sodium chloride 0.9 % 250 mL intermittent infusion (has no administration in time range)   iopamidol (ISOVUE-370) solution 146 mL (has no administration in time range)   sodium chloride 0.9 % bag 500mL for CT scan flush use (has no administration in time range)       Assessments & Plan (with Medical Decision Making)     MDM: Bess Enamorado is a 48 year old female presenting with acute respiratory failure with hypoxia as well as signs of severe sepsis with temperature to 102 heart rate into the 110s, but reassuring blood pressure.  Initially was unclear source with no obvious localizing symptoms but a very rapid onset and history of prior lower extremity cellulitis and sepsis related to severe lymphedema.  Arrived in the emergency department in distress and was empirically given antibiotics to cover not only typical sources but also the potential for necrotizing fasciitis in the lower extremities.  She ultimately underwent CT of the chest abdomen pelvis with PE contrast on the chest and bilateral lower extremities to the level of the knees to evaluate for deep space  infection.  She was covered with clindamycin initially along with Zosyn and Vanco.  It appears that her findings are mostly consistent with UTI closely with this and her antibiotics can be adjusted to cover primarily UTI now that the CT demonstrates no deep space infection or necrotizing fasciitis.  There is no gas in the tissues.  There is no asymmetry in the lower extremity localized edema.  She was initially placed on BiPAP for significant hypoxia but improved significantly and able to wean FiO2 down to 25%.  Inspiratory pressure at around 14.     Discussed with Kristine Torres at the time we are pending CT results.  Signed out to Dr. Monet while awaiting results.  I have reviewed the nursing notes.    I have reviewed the findings, diagnosis, plan and need for follow up with the patient.            Medical Decision Making  The patient's presentation was of high complexity (an acute health issue posing potential threat to life or bodily function).    The patient's evaluation involved:  ordering and/or review of 3+ test(s) in this encounter (see separate area of note for details)    The patient's management necessitated high risk (a decision regarding hospitalization).        New Prescriptions    No medications on file       Final diagnoses:   Urinary tract infection without hematuria, site unspecified   Sepsis, due to unspecified organism, unspecified whether acute organ dysfunction present (H)   Acute respiratory failure with hypoxia (H)   Cellulitis of left lower extremity       7/18/2024   [facility] EMERGENCY DEPT       Spenser Turner MD  07/19/24 0046

## 2024-07-19 NOTE — CONSULTS
"Pharmacy Vancomycin Initial Note  Date of Service 2024  Patient's  1975  48 year old, female    Indication: Skin and Soft Tissue Infection    Current estimated CrCl = Estimated Creatinine Clearance: 130.9 mL/min (based on SCr of 0.71 mg/dL).    Creatinine for last 3 days  No results found for requested labs within last 3 days.    Recent Vancomycin Level(s) for last 3 days  No results found for requested labs within last 3 days.      Vancomycin IV Administrations (past 72 hours)        No vancomycin orders with administrations in past 72 hours.                    Nephrotoxins and other renal medications (From now, onward)      Start     Dose/Rate Route Frequency Ordered Stop    24 220  vancomycin (VANCOCIN) 1,250 mg in sodium chloride 0.9 % 250 mL intermittent infusion         1,250 mg  over 90 Minutes Intravenous EVERY 12 HOURS 24  piperacillin-tazobactam (ZOSYN) 3.375 g vial to attach to  mL bag        Note to Pharmacy: For [facility], [facility] and [facility]: For Zosyn-naive patients, use the \"Zosyn initial dose + extended infusion\" order panel.    3.375 g  over 30 Minutes Intravenous EVERY 6 HOURS 24              Contrast Orders - past 72 hours (72h ago, onward)      None            [employer] Prediction of Planned Initial Vancomycin Regimen  Loading dose: N/A  Regimen: 1250 mg IV every 12 hours.  Start time: 20:52 on 2024  Exposure target: AUC24 (range)400-600 mg/L.hr   AUC24,ss: 514 mg/L.hr  Probability of AUC24 > 400: 68 %  Ctrough,ss: 13.9 mg/L  Probability of Ctrough,ss > 20: 33 %  Probability of nephrotoxicity (Lodise ARMANDO ): 9 %        Plan:  Start vancomycin  1250 mg IV q12h.   Vancomycin monitoring method: AUC  Vancomycin therapeutic monitoring goal: 400-600 mg*h/L  Pharmacy will check vancomycin levels as appropriate in 1-3 Days.    Serum creatinine levels will be ordered daily for the first week of therapy and at least twice weekly for " subsequent weeks.      Sandra Wilcox, PharmD

## 2024-07-19 NOTE — PROGRESS NOTES
07/19/24 1320   Appointment Info   Signing Clinician's Name / Credentials (PT) Krysta Hallman, PT   Living Environment   People in Home alone   Current Living Arrangements house   Home Accessibility no concerns   [facility]   Self-Care   Equipment Currently Used at Home grab bar, toilet;shower chair;walker, rolling;commode chair;grab bar, tub/shower;wheelchair, manual   Fall history within last six months no   Activity/Exercise/Self-Care Comment indep with mobility, amb x10 feet at baseline with 4WW otherwise uses wheelchair. has been seeing home care PT who has been working on short distance walking, standing tolerance, and LE strengthening. indep with ADL's. was about to work on car transfer with home care PT prior to admission.   General Information   Onset of Illness/Injury or Date of Surgery 07/19/24   Referring Physician Ian Ellis,    Patient/Family Therapy Goals Statement (PT) return home   Pertinent History of Current Problem (include personal factors and/or comorbidities that impact the POC) 48 y.o. female admitted with fever and resp distress, concern for infection, required Bipap and pressors at admission, now on HiFlow.   Existing Precautions/Restrictions oxygen therapy device and L/min  (has been wearing 2L at baseline recently, Cpap at night)   Cognition   Affect/Mental Status (Cognition) WFL   Pain Assessment   Patient Currently in Pain No   Integumentary/Edema   Integumentary/Edema Comments chronic edema in BLE, general redness, pt stating is worse than normal, pappilomas present R>L, has custom wraps at home she is able to manage independently   Range of Motion (ROM)   Range of Motion ROM is WFL   Strength (Manual Muscle Testing)   Strength Comments general LE weakness from reduced mobility   Bed Mobility   Comment, (Bed Mobility) not assessed due to medical status, sleeps in recliner chair at home   Transfers   Comment, (Transfers) not assessed due to  medical status   Gait/Stairs (Locomotion)   Comment, (Gait/Stairs) not assessed, typically ambulates 10 feet at baseline, wheelchair fits in home   Balance   Balance Comments no falls   Clinical Impression   Criteria for Skilled Therapeutic Intervention Yes, treatment indicated   PT Diagnosis (PT) impaired functional mobility   Influenced by the following impairments LE weakness, reduced activity tolerance, edema   Functional limitations due to impairments impaired transfers, gait   Clinical Presentation (PT Evaluation Complexity) evolving   Clinical Presentation Rationale clinical reasoning   Clinical Decision Making (Complexity) low complexity   Planned Therapy Interventions (PT) balance training;gait training;home exercise program;patient/family education;strengthening;transfer training;wheelchair management/propulsion training;progressive activity/exercise;risk factor education;home program guidelines   Risk & Benefits of therapy have been explained evaluation/treatment results reviewed;care plan/treatment goals reviewed;risks/benefits reviewed;current/potential barriers reviewed;participants voiced agreement with care plan;participants included;patient   Clinical Impression Comments Limited eval due to medical status, pt off Bipap and on Hiflow, low BP this AM so remains in bed for session.  Pt familiar to writer from previous admissions, has excellent setup at home and is aware of her own limitations. Discussed exercises to be completed in bed until further mobility assessment tomorrow including ankle pumps, heel slides, and hip abd/add, pt familar wtih exercises from home care PT prior to admission. Discussed with pt for session tomorrow to have nursing lift pt to chair first, PT will assess pt once in chair (pt does not typically sleep in a bed so will perform better if in chair). Pt and nurse present and in agreement with this plan. Anticipate with clinical progression and mobility assessment, she will  progress to return home with home care PT.   PT Total Evaluation Time   PT Eval, Low Complexity Minutes (35927) 10   Physical Therapy Goals   PT Frequency 5x/week   PT Predicted Duration/Target Date for Goal Attainment 07/26/24   PT Goals Transfers;Gait   PT: Transfers Supervision/stand-by assist;Bed to/from chair;Assistive device   PT: Gait Supervision/stand-by assist;Standard walker;10 feet   PT Discharge Planning   PT Plan Fri 1/5; coordinate with nursing to lift pt to chair prior to PT session, assess transfers, needs to be able to transfer to/from wheelchair to return home, cont LE strengthening   PT Rationale for DC Rec mobility not assessed, see above, pt very aware of limitations so anticipate with clinical progression she will progress to home with home care PT   PT Brief overview of current status ther ex   PT Equipment Needed at Discharge   (has all equipment)   Total Session Time   Total Session Time (sum of timed and untimed services) 10

## 2024-07-19 NOTE — PLAN OF CARE
Problem: Adult Inpatient Plan of Care  Goal: Absence of Hospital-Acquired Illness or Injury  Intervention: Prevent Skin Injury  Recent Flowsheet Documentation  Taken 7/19/2024 1600 by Jareth Cheney RN  Body Position:   turned   upper extremity elevated   weight shifting  Skin Protection:   adhesive use limited   pulse oximeter probe site changed   silicone foam dressing in place   transparent dressing maintained   incontinence pads utilized  Device Skin Pressure Protection:   skin-to-device areas padded   tubing/devices free from skin contact   absorbent pad utilized/changed  Taken 7/19/2024 1400 by Jareth Cheney RN  Body Position: (for Echo)   turned   supine   upper extremity elevated  Taken 7/19/2024 1200 by Jareth Cheney RN  Body Position:   right   turned   weight shifting  Skin Protection:   adhesive use limited   pulse oximeter probe site changed   silicone foam dressing in place   transparent dressing maintained   incontinence pads utilized  Device Skin Pressure Protection:   skin-to-device areas padded   tubing/devices free from skin contact   absorbent pad utilized/changed  Taken 7/19/2024 1015 by Jareth Cheney RN  Body Position:   weight shifting   turned   left  Taken 7/19/2024 0800 by Jareth Cheney RN  Body Position:   weight shifting   supine   turned  Skin Protection:   adhesive use limited   pulse oximeter probe site changed   silicone foam dressing in place   transparent dressing maintained   incontinence pads utilized  Device Skin Pressure Protection:   skin-to-device areas padded   tubing/devices free from skin contact   absorbent pad utilized/changed     Problem: Sepsis/Septic Shock  Goal: Optimal Coping  Intervention: Optimize Psychosocial Adjustment to Illness  Recent Flowsheet Documentation  Taken 7/19/2024 1600 by Jareth Cheney RN  Supportive Measures:   active listening utilized   relaxation techniques promoted   decision-making supported  Taken  7/19/2024 1200 by Jareth Cheney RN  Supportive Measures:   active listening utilized   relaxation techniques promoted   decision-making supported  Taken 7/19/2024 0800 by Jareth Cheney RN  Supportive Measures:   active listening utilized   relaxation techniques promoted   decision-making supported  Goal: Blood Glucose Level Within Targeted Range  Intervention: Optimize Glycemic Control  Recent Flowsheet Documentation  Taken 7/19/2024 1600 by Jareth Cheney RN  Glycemic Management: blood glucose monitored  Taken 7/19/2024 1200 by Jareth Cheney RN  Glycemic Management: blood glucose monitored  Taken 7/19/2024 0800 by Jareth Cheney RN  Glycemic Management: blood glucose monitored  Goal: Absence of Infection Signs and Symptoms  Intervention: Initiate Sepsis Management  Recent Flowsheet Documentation  Taken 7/19/2024 1600 by Jareth Cheney RN  Infection Prevention:   cohorting utilized   equipment surfaces disinfected   hand hygiene promoted   rest/sleep promoted   single patient room provided  Taken 7/19/2024 1200 by Jareth Cheney RN  Infection Prevention:   cohorting utilized   equipment surfaces disinfected   hand hygiene promoted   rest/sleep promoted   single patient room provided  Taken 7/19/2024 0800 by Jareth Cheney RN  Infection Prevention:   cohorting utilized   equipment surfaces disinfected   hand hygiene promoted   rest/sleep promoted   single patient room provided  Intervention: Promote Stabilization  Recent Flowsheet Documentation  Taken 7/19/2024 1600 by Jareth Cheney RN  Lung Protection Measures:   fluid excess minimized   lung compliance monitored  Taken 7/19/2024 1200 by Jareth Cheney RN  Lung Protection Measures:   fluid excess minimized   lung compliance monitored  Taken 7/19/2024 0800 by Jareth Cheney RN  Lung Protection Measures:   fluid excess minimized   lung compliance monitored  Intervention: Promote Recovery  Recent Flowsheet  Documentation  Taken 7/19/2024 1600 by Jareth Cheney RN  Airway/Ventilation Support: comfort measures provided  Activity Management: activity adjusted per tolerance  Taken 7/19/2024 1200 by Jareth Cheney RN  Airway/Ventilation Support: comfort measures provided  Activity Management: activity adjusted per tolerance  Taken 7/19/2024 0800 by Jareth Cheney RN  Airway/Ventilation Support: comfort measures provided  Activity Management: activity adjusted per tolerance  Goal: Optimal Nutrition Intake  Intervention: Optimize Nutrition Delivery  Recent Flowsheet Documentation  Taken 7/19/2024 1600 by Jareth Cheney RN  Nutrition Support Management: weight trending reviewed  Taken 7/19/2024 1200 by Jareth Cheney RN  Nutrition Support Management: weight trending reviewed  Taken 7/19/2024 0800 by Jareth Cheney RN  Nutrition Support Management: weight trending reviewed   Goal Outcome Evaluation:    End Of Shift Note      Subjective/Objective:    Neuro: Alert and oriented, able to make needs known. No deficits noted.     Cardiac: RRR. SR. Noted BLE edema. Noted cyanotic fingers, face, nose and toes. Patient states that she chronically has this. Patient continues on levophed per MD order for BP control. Echo done and aeaiting results    Resp: CTA, diminished. HFNC when awake, but then patient requesting BiPap when sleeping between meals. Patient has NARCISA and does normally wear CPAP. Rare cough noted    GI/: Patient using purewick, UO increasing. Clear zarina color noted. No BM this shift, + flatus. Abdomen is obese with large panus.    Endo: Q4 blood sugars per ICU all WNL    MSK: BLE defecits. Patient states that her Left LE is very difficult for her to move. Generalized weakness, but can assist with moving in bed and states that she is weight shifting at least every hour. Patient educated and reaffirmed the need to move with skin issues     Skin: BLE very cracked, rough, with noted nodules.  Mepilex to coccyx. After Echo patient noted to have a crack under left breast that was bothering her. Wound cleansed and dried and intradry applied. Spoke with PT/OT this am and OT that does lymphedema wraps to see tomorrow.     LDAs: GAMA UNDERWOOD

## 2024-07-19 NOTE — ED PROVIDER NOTES
Brief signout note.    Signout taken from Dr. Turner.  Please see his note for details.  Patient has sepsis and acute hypoxic respiratory failure.  She is stable on BiPAP right now.  Received vancomycin, Zosyn and clindamycin.  Received 1500 cc of normal saline.  Signed out pending CTs.  CTs are reviewed and no evidence of necrotizing infection.  There is appear to be left thigh cellulitis.  He also has a urinary tract infection.  Discussed the case with the hospitalist who accepted for admission.    MD Chiki Valerio Sean, MD  07/19/24 0025

## 2024-07-19 NOTE — PROGRESS NOTES
"[place] [facility] ADMISSION NOTE    Patient admitted to room 1003 at approximately 130am via cart from emergency room. Patient was accompanied by transport tech and nurse, RT.     Verbal SBAR report received from Rosey ARGUELLES prior to patient arrival.     Patient ambulated to bed air mat. Patient alert and oriented X 3. The patient is not having any pain.  . Admission vital signs: Blood pressure (!) 86/50, pulse 95, temperature (!) 100.5  F (38.1  C), temperature source Axillary, resp. rate 24, height 1.626 m (5' 4\"), weight 146.5 kg (322 lb 15.6 oz), last menstrual period 06/04/2024, SpO2 96%, not currently breastfeeding. Patient was oriented to plan of care, call light, bed controls, tv, telephone, bathroom, and visiting hours.     Risk Assessment    The following safety risks were identified during admission: fall, skin, and aspiration. Yellow risk band applied: YES.     Skin Initial Assessment    This writer admitted this patient and completed a full skin assessment and Ben score in the Adult PCS flowsheet.   Photo documentation of skin problem and/or wound competed via [employer] application (located under Media):  No    Appropriate interventions initiated as needed.     Secondary skin check completed by Aimee ARGUELLES.         Education    Patient has a [place] to Observation order: No  Observation education completed and documented: N/A      Joceline Sierra RN      "

## 2024-07-19 NOTE — PROGRESS NOTES
Patient has Bipap and HFNC available for use in room.  Patient is alternating systems per R.N.  Respiratory therapy is available for consult.  Patient does have home CPAP machine available which she can transition to when bilevel no longer needed.

## 2024-07-19 NOTE — PROGRESS NOTES
[facility]  Procedure Note         PICC      Bess Enamorado  MRN# [id number]   July 19, 2024, 12:31 PM Indication: Laboratory sampling  Medication administration           Pause for the cause: Consent for catheter placement procedure signed  Time out completed  Patient ID's verified using two distinct indicators  All necessary equipment is present  Site marked if extremity to be used has been predetermined   Type of line to be used: PICC   Full barrier precautions used: Yes   Skin preparation: Chlorehexidine Gluconate 25% with isopropyl alcohol 70%   Date of insertion: July 19, 2024, 12:00 PM   Device type: Double lumen, valved, 5.0   Catheter brand: [employer]   Lot number: [id number], exp 6/30/2025   Insertion location: Left basilic vein   Method of placement: Venipuncture  MST  Ultrasound   Number of attempts: With ultrasound: 1   Without ultrasound: 0   Difficulty threading: No   Midline IV device: Dressing dry and intact  Transparent semmipermeable dressing applied  Chlorhexidine patch  Catheter securement device   Arm circumference: Adults 10 cm above AC   Midline extremity circumference: 43 cm   Internal length: 45 cm   Midline visible catheter length: 1 cm   Total catheter length: 46 cm   Tip termination: Mid SVC   Method of verification: Chest x-ray   Midline patency post placement: Positive blood return  Flushes without difficulty  Saline locked   Line flush: Line flush documented on the eMAR yes   Placement verified by: Physician   Catheter placed by: Laurie Newell RN   Discontinuation form initiated: No   Patient tolerance: Tolerated well   PICC Insertion Education Complete: Yes      Summary:  This procedure was performed without difficulty and she tolerated the procedure well with no noted immediate complications.   OK to use for meds, labs and power injection.      Recorded by Laurie Newell RN    Attestation:  Amount of time performed on this procedure: 20 minutes.     Time out completed by Leslie  Augusta ARGUELLES

## 2024-07-19 NOTE — CONSULTS
"[facility]  WOC Nurse Inpatient Assessment     Consulted for: BLE, thighs    Summary: chronic ongoing issue due to patient's body habitus.     Patient History (according to provider note(s):          Assessment:      Areas visualized during today's visit: Lower extremities     No open wounds noted today.  Patient has what appears to be significant lymphedema and lipedema. She has nodules/lobules of swelling tissue hanging off of her lower leg and thighs. No open areas noted today. Per discussion with patient, these will occasionally get some drainage and then when she cleans them, she ends up in the hospital. She is able to change her positioning and walk thoughout the day, however her wheelchair and her recliner do push on them quite frequently. She also states that she cannot wear any underwear or any kind of dressing as when she urinates, the items get wet immediately.     Discussed that although typically we like wounds kept covered and moist for optimal healing, this is likely not ideal in her situation due to issues as noted above. Pt should let these areas scab up to create their own \"bandaid\" and appear to heal quite quickly if patient is able to keep pressure off of them. Patient verbalized understanding.    Treatment Plan:     Patient would benefit from a bariatric bed and mattress if one is available    RECOMMEND PRIMARY TEAM ORDER: None, at this time  Education provided: plan of care  Discussed plan of care with: Patient  WOC nurse follow-up plan: signing off  Notify WOC if wound(s) deteriorate.  Nursing to notify the Provider(s) and re-consult the WOC Nurse if new skin concern.    DATA:     Current support surface: Standard  Standard Isoflex gel  Containment of urine/stool: Incontinence Protocol  BMI: Body mass index is 55.44 kg/m .   Active diet order: Orders Placed This Encounter      Combination Diet 2 gm NA Diet     Output: I/O last 3 completed shifts:  In: 3246.84 " I spoke with Mom at the time frame of the the virtual visit as there was no one on when I first sent link  Instructed her to follow link to do virtual visit  No one came on virtual visit so called back x2 and LM to call back to try to visit telephonically with no response  Mom then called and spoke with  and provided different number to do visit at - [phone]  I tried this and no one answered  LM to please join virtual visit  Still no answer and no one joined virtual visit after 7 minutes of waiting  [P.O.:840; I.V.:906.84; IV Piggyback:1500]  Out: 650 [Urine:650]     Labs:   Recent Labs   Lab 07/19/24  0329   ALBUMIN 3.5   HGB 15.1   WBC 12.9*     Pressure injury risk assessment:   Sensory Perception: 3-->slightly limited  Moisture: 2-->very moist  Activity: 1-->bedfast  Mobility: 2-->very limited  Nutrition: 3-->adequate  Friction and Shear: 1-->problem  Ben Score: 12    PAZ BARRETO RN CWOCN, CFCN  Pager no longer is use, please contact through [employer]   Dept. Office Number: [phone]

## 2024-07-19 NOTE — ED TRIAGE NOTES
Pt arrives via EMS stating 2 hours ago fever came on took tylenonol an hour ago and recheck of temp at 99.8. Pt came in on 8 L of O2 normally on 2 L of oxygen. Pt has some lympodema of left leg with drainage but primary concern today is sudden onset of fever.      Triage Assessment (Adult)       Row Name 07/18/24 2019          Triage Assessment    Airway WDL WDL        Respiratory WDL    Respiratory WDL rhythm/pattern     Rhythm/Pattern, Respiratory tachypneic        Skin Circulation/Temperature WDL    Skin Circulation/Temperature WDL WDL        Cardiac WDL    Cardiac WDL WDL        Peripheral/Neurovascular WDL    Peripheral Neurovascular WDL WDL        Cognitive/Neuro/Behavioral WDL    Cognitive/Neuro/Behavioral WDL WDL

## 2024-07-19 NOTE — PROGRESS NOTES
[facility]    Medicine Progress Note - Hospitalist Service    Date of Admission:  7/18/2024    Assessment & Plan    Bess Enamorado is a 48 year old female with pmhx significant for chronic lymphedema complicated by recurrent BLLE cellulitis who presented to [facility] with septic shock and was admitted to the ICU.     Septic Shock  Enterococcus Faecalis Bacteremia  Secondary to BLE cellulitis, urinary tract infection (asx but has MS and sensory deficit)  On IV antibiotics, IV fluids  7/19: patient feels improved compared to admission, still on pressors without significant change in requirements    TTE pending  Continue Vancomycin/zosyn (can stop zosyn likely when off pressors)  Repeat Blood Cx in AM  On mIV fluids (OKAY TO STOP WHEN LACTIC ACID <2.0)  Maintain MAP > 65  Levophed prn  Continue to monitor     Acute on chronic respiratory failure with hypoxemia  Patient endorses increased o2 requirements during illness previously  Patient uses 2 liters O2 at home for her CHF  Presented in ED on 10 liters  Currently on Bipap continuous / HFNC    SpO2 goal >90%  Wean as tolerated     Diastolic CHF  Currently patient is on IV fluids  Holding home diuretics     Monitor volume status carefully     BLLE cellulitis, skin folds wounds    On IV antibiotics  wound care consult  Nystatin cream for skin folds          Diet: Combination Diet 2 gm NA Diet    DVT Prophylaxis: Enoxaparin (Lovenox) SQ  Brar Catheter: Not present  Lines: PRESENT      PICC 07/19/24 Double Lumen Left Basilic Pressors, vesicant infusion and labs-Site Assessment: WDL      Cardiac Monitoring: ACTIVE order. Indication: ICU  Code Status: Full Code      Clinically Significant Risk Factors Present on Admission           # Hypercalcemia: Highest Ca = 10.2 mg/dL in last 2 days, will monitor as appropriate      # Drug Induced Platelet Defect: home medication list includes an antiplatelet medication   # Hypertension: Noted on  "problem list    # Chronic heart failure with preserved ejection fraction: heart failure noted on problem list and last echo with EF >50%  # Circulatory Shock: required vasopressors within past 24 hours    # Non-Invasive mechanical ventilation: current O2 Device: High Flow Nasal Cannula (HFNC)  # Acute hypoxic respiratory failure: continue supplemental O2 as needed            # Severe Obesity: Estimated body mass index is 55.44 kg/m  as calculated from the following:    Height as of this encounter: 1.626 m (5' 4\").    Weight as of this encounter: 146.5 kg (322 lb 15.6 oz).         # Financial/Environmental Concerns:           Disposition Plan     Medically Ready for Discharge: Anticipated in 2-4 Days             Alex Porter DO  Hospitalist Service  [facility]  Securely message with [employer] (more info)  Text page via [employer] Paging/Directory   ______________________________________________________________________    Interval History   NAEO. Still requiring pressors. Feels much improved.     Physical Exam   Vital Signs: Temp: 98.8  F (37.1  C) Temp src: Oral BP: 115/52 Pulse: 74   Resp: 29 SpO2: 99 % O2 Device: High Flow Nasal Cannula (HFNC) Oxygen Delivery: 50 LPM  Weight: 322 lbs 15.58 oz    Physical Exam  Constitutional:       General: Pt is not in acute distress.  HENT:      Head: Normocephalic and atraumatic.      Nose: Nose normal.   Eyes:      Conjunctiva/sclera: Conjunctivae normal.   Pulmonary:      Effort: Pulmonary effort is labored but no acute distress. On HFNC   Abdominal:      General: Abdomen is distended but non-tender.  Skin:     Findings: + Rash.   Neurological:      General: No focal deficit present.      Mental Status: Pt is alert.   Psychiatric:         Mood and Affect: Mood normal.       Medical Decision Making       60 MINUTES SPENT BY ME on the date of service doing chart review, history, exam, documentation & further activities per the note.      Data     I have " personally reviewed the following data over the past 24 hrs:    12.9 (H)  \   15.1   / 155     136 101 13.8 /  111 (H)   3.9 22 0.74 \     ALT: 6 AST: 20 AP: 74 TBILI: 3.5 (H)   ALB: 3.5 TOT PROTEIN: 7.8 LIPASE: N/A     Procal: 0.06 CRP: N/A Lactic Acid: 2.6 (H)         Imaging results reviewed over the past 24 hrs:   Recent Results (from the past 24 hour(s))   POC US CHEST B-SCAN    Impression    [facility] Procedure Note      Limited Bedside ED Cardiac Ultrasound:    PROCEDURE: PERFORMED BY: Dr. Spenser Turner MD  INDICATIONS/SYMPTOM:  Shortness of Breath  PROBE: Cardiac phased array probe  BODY LOCATION: Chest  FINDINGS:     Very difficult views due to body habitus.  I could visualize the IVC and appears to be about 2+ cm dilated -and it collapses to about 1 cm.  Patient was partial cardiac walls and they appear to be hyperkinetic.                                             INTERPRETATION: Difficult views.  IVC does appear to be at least 2 cm dilated and collapses to about 1 cm in consistent likely with normovolemia at the start the remainder of the echocardiogram was difficult to interpret due to body habitus.    I had difficulty also visualizing the sliding lung sign.    IMAGE DOCUMENTATION: Images were archived to PACs system.         XR Chest Port 1 View    Narrative    EXAM: XR CHEST PORT 1 VIEW  LOCATION: [facility]  DATE: 7/18/2024    INDICATION: Respiratory Failure  COMPARISON: Chest CT 4/18/2024      Impression    IMPRESSION: Stable enlargement of the cardiac silhouette. Possible pulmonary vascular congestion without patricia consolidation, pleural effusion or pneumothorax. No acute bony abnormality.   CT Femur Thigh Right w Contrast    Narrative    EXAM: CT FEMUR THIGH RIGHT W CONTRAST  LOCATION: [facility]  DATE: 7/18/2024    INDICATION: 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 Abrupt sepsis with extensive chronic  lymphedema.  COMPARISON: Ultrasound venous bilateral lower extremity Doppler 3/16/2024.  TECHNIQUE: IV contrast. Axial, sagittal and coronal thin-section reconstruction. Dose reduction techniques were used.   CONTRAST: 146 mL Isovue-370 IV injected for CT pulmonary angiogram, CT abdomen and pelvis with IV contrast, no additional contrast injected for CT right femur.    FINDINGS:     BONES:  -Anatomic alignment of the right femur without fracture, dislocation, lytic or destructive process. Mild degenerative changes right hip and visualized knee joint.    SOFT TISSUES:  -Right ovarian fatty dermoid. Superficial soft tissue edema involving the ventral abdominal wall fat. Diffuse soft tissue edema involving the right thigh to the edge of field-of-view. No discrete fluid collection to suggest abscess formation. No opaque   foreign body or foci of soft tissue gas. Multiple superficial varicose veins. Partially visualized soft tissue edema involving the medial left thigh (please see separate CT left femur report).      Impression    IMPRESSION:  1.  Diffuse soft tissue edema involving the right thigh, more apparent distally. Partially visualized soft tissue edema involving the left thigh (please see separate CT left femur report). Superficial soft tissue edema involving the ventral abdominal   wall fat anteroinferiorly. No discrete fluid collection, opaque foreign body or soft tissue gas.    2.  Right ovarian fatty dermoid.     CT Femur Thigh Left with Contrast    Narrative    EXAM: CT FEMUR THIGH LEFT WITH CONTRAST  LOCATION: [facility]  DATE: 7/18/2024    INDICATION: Lower extremity swelling.  COMPARISON: None available.  TECHNIQUE: IV contrast. Axial, sagittal and coronal thin-section reconstruction. Dose reduction techniques were used.   CONTRAST: 146 mL Isovue-370.    FINDINGS:     There is diffuse soft tissue swelling throughout the cutaneous and subcutaneous tissues of the visualized left  thigh, greatest medially. This extends deep to abut the superficial fascia, without evidence of deep fascial involvement. Muscularity appears   normal in bulk and attenuation. No evidence of abscess or soft tissue gas. Of note, soft tissues is incompletely visualized distally into the lower leg, due to exclusion from field-of-view.    Milder soft tissue swelling is noted within the medial aspect of the right thigh.    No CT evidence of acute fracture or malalignment. Mild polyarticular osteoarthritic. No knee joint effusion.    No identifiable left inguinal lymphadenopathy. A large dermoid cyst is present within the right adnexa, measuring up to 9.3 cm and best appreciated on right thigh CT.      Impression    IMPRESSION:  1.  Diffuse soft tissue swelling throughout the cutaneous and subcutaneous tissues of the visualized left thigh, greatest medially. Findings may be attributable to a bland edema pattern or cellulitis. No evidence of abscess or soft tissue gas.  2.  Milder soft tissue swelling within the medial aspect of the right thigh.  3.  Large right adnexal dermoid cyst, measuring up to 9.3 cm. Gynecologic consultation is recommended.     CT Chest (PE) Abdomen Pelvis w Contrast    Narrative    EXAM: CT CHEST PE ABDOMEN PELVIS W CONTRAST  LOCATION: [facility]  DATE: 7/18/2024    INDICATION: Acute hypoxic respiratory failure and sepsis.  COMPARISON: Multiple priors with most recent 4/26/2024.  TECHNIQUE: CT chest pulmonary angiogram and routine CT abdomen pelvis with IV contrast. Arterial phase through the chest and venous phase through the abdomen and pelvis. Multiplanar reformats and MIP reconstructions were performed. Dose reduction   techniques were used.   CONTRAST: 146 mL Isovue 370    FINDINGS:  ANGIOGRAM CHEST: Enlargement of the central pulmonary arteries. No evidence for pulmonary embolism. Normal caliber thoracic aorta without dissection.    LUNGS AND PLEURA: Mosaic  attenuation throughout the pulmonary parenchyma. No consolidation, pleural fluid or pneumothorax. Calcified granulomas bilaterally.    MEDIASTINUM/AXILLAE: Cardiac enlargement with biatrial enlargement. No pericardial fluid. No lymphadenopathy.    CORONARY ARTERY CALCIFICATION: None.    HEPATOBILIARY: Hepatomegaly. Diffuse hepatic steatosis. Suggestion of surface contour nodularity right hepatic lobe. Cholecystectomy. No biliary dilatation.    PANCREAS: Normal.    SPLEEN: Normal.    ADRENAL GLANDS: Normal.    KIDNEYS/BLADDER: Symmetric renal enhancement. Nonobstructing 3 mm calculus left upper renal pole. No urinary collecting system dilatation. Mild contrast excretion into the bladder. Bladder decompressed.    BOWEL: No obstruction or inflammatory change. Appendix unremarkable.    LYMPH NODES: Minimal interval increase in size of retroperitoneal lymph nodes in the upper abdomen. For example a lymph node in the periaortic space measures 0.8 cm, previously 0.6 cm. Multiple other lymph nodes also demonstrate a mild increase in size.    VASCULATURE: Normal caliber aorta. Minimal vascular calcification.    PELVIC ORGANS: Unchanged right adnexal dermoid. No surrounding inflammatory change. No free fluid.    MUSCULOSKELETAL: Skin thickening and subcutaneous edema involving the patient's anterior abdominal/pelvic wall. Degenerative hypertrophic changes in the spine.      Impression    IMPRESSION:  1.  No pulmonary embolism. Enlargement of central pulmonary arteries which can be seen with pulmonary arterial hypertension.    2.  Normal caliber aorta without dissection.    3.  Diffuse mosaic attenuation throughout the pulmonary parenchyma. These findings can be seen with air trapping related to reactive airways disease or viral etiologies.    4.  Hepatomegaly with diffuse hepatic steatosis.    5.  Nonobstructing 3 mm calculus left upper renal pole.    6.  Unchanged right adnexal dermoid cyst. No free fluid or surrounding  inflammatory change.   XR Chest Port 1 View    Narrative    XR CHEST PORT 1 VIEW   7/19/2024 12:26 PM     HISTORY: RN placed PICC - verify tip placement    COMPARISON: CT 7/18/2024      Impression    IMPRESSION:   1. Right PICC with the tip in the proximal to mid SVC.  2. Stable mildly enlarged cardiopericardial silhouette.  3. Minimal basilar atelectasis. No focal consolidation, pleural  effusion, or pneumothorax identified.  4. Minimally elevated right hemidiaphragm.    DONOVAN GARCIA MD         SYSTEM ID:  [id number]

## 2024-07-19 NOTE — ED NOTES
Pt presents via EMS tachypneic and hypoxic, cyanosis noted around lips and hands, Pt reports this is baseline, RT and Dr. Turner called to bedside, RT started BiPAP

## 2024-07-19 NOTE — PROGRESS NOTES
48 y woman presenting with fever and dyspnea. History of MS, recurrent lower extremity cellulitis, morbid obesity, NARCISA on CPAP, HFpEF. Source of fever thought to be cellulitis and/or UTI.     On Zosyn, vanc, clinda. CT BLE shows no gas or discrete collection, rather soft tissue swelling in both thighs. No surgical consultation in the EMR.    Lactate dropping from 4.9 to 3.2 after fluid bolus yesterday in ER. Electrolytes, renal function, hepatic labs exclusive of bili wnl. T bili 3.5 today. Up from 2.7 yesterday. (S/p cholecystectomy). White count up from 9.8 to 12.9 today. On bipap overnight (on cpap at home). Gas before and after cpap reasonable    I was able to observe the patient remotely via tele-monitor. She is a very pleasant woman, obese, resting in bed with HFNC at 85% and 50 L/min, somewhat tachypneic but still able to speak full sentences. SpO2 97%. MAP 72 on NE 0.05 mcg/kg/min. Taking in food and drink without issue.    Septic shock   UTI and soft tissue infection  Acute on chronic hypoxic respiratory failure    - Respiratory: Continue with HFNC as needed during the day and Bipap/CPAP at night. Target SpO2 90 to 92%    - CV: Continue with NE to target MAP 65. May need another bolus (completion within an hour) 1 liter crystalloid given lactate and febrile/septic state. Of note, while the IVC was indeed plump on POCUS images on presentation, the ddx of this is more expansive than volume overload since the IVC diameter and collapsibility are really a surrogate for right atrial pressure. Besides volume, right heart failure/strain can cause this sonographic finding. On her previous echo (3/17/24) she was indeed noted to have moderate decrease in RV function and large IVC as expected given her NARCISA causing increased PHTN. She tolerated her 2 liter fluid bolus well yesterday and I suspect during her catabolic state she may need another 1 or 2 liter bolus today. Fortunately, she is eating or drinking. I will  order lactate. If not decreasing, consider bolus. No maintenance fluid needed. Agree with holding home diuresis for now with daily consideration of re-starting.    - ID: Continue abx. Currently, on clindamycin for possible nec fasc. To minimize adverse effects de-escalation as soon as feasible. Since nec fasc is clinical diagnosis and a surgical emergency, please consult general surgery asap if you believe this is truly nec fasc. Otherwise, clindamycin can be stopped and Zosyn and vanc continued and narrowed as cultures return.    -GI: Bump in total bili. If not improved on next lab, consider RUQ ultrasound to rule out obstructive process in CBD (s/p GB removal). Review med list to see if this is contributing.    It was a pleasure to consult on this case. Please call for any concerns. Tele-ICU will continue to follow.    Sandra Nobles MD  Critical Care Medicine    At the time of my review I was able to access to the patient s medical records/chart but not personally see or evaluate the patient. I cannot provide direct medical advice or consultation, but can provide a general opinion for consideration by the in-person treating provider. The contents of this note are not meant to replace or supersede the clinical judgement of the in-person treating provider.

## 2024-07-19 NOTE — H&P
History and Physical     FACILITY: [facility]      Name: Bess Enamorado   Age: 48 year old    : 1975                                                                       MRN: [id number]                                                                                          PCP: Mikala Luna                                                                                              Chief Complaint   Patient presents with    Fever     Pt arrives via EMS stating 2 hours ago fever came on took tylenonol an hour ago and recheck of temp at 99.8. Pt came in on 8 L of O2 normally on 2 L of oxygen. Pt has some lympodema of left leg with drainage but primary concern today is sudden onset of fever.           HPI:   Bess Enamorado is a 48 year old female who presented with fever and respiratory distress. Patient uses 2 liters oxygen at home for her CHF. She had increased shortness of breath. Her main complaint was fevers. It started around 6 PM. She stated when she get fevers, it is usually because of skin infection, usually in her legs. She reports history of MS which makes it difficult for her to move her legs especially the left leg. She can't move the leg well and there is sensory loss. The last time she dealt with skin infection was in April. She denied any cough, urinary symptoms, emesis or diarrhea before fever started.    Past medical history: CHFpEF, NARCISA on CPAP, chronic hypoxemia, MS, obesity, Depression, chronic lymphedema     Social history: no tobacco, no alcohol    Past surgical history: cholecystectomy, foot surgery    Family history: non contributory    Patient Active Problem List    Diagnosis Date Noted    Acute respiratory failure with hypoxia (H) 2024     Priority: Medium    Cellulitis of left lower extremity 2024     Priority: Medium    Urinary tract infection without hematuria, site unspecified 2024     Priority: Medium    Cellulitis of abdominal wall  04/26/2024     Priority: Medium    Weakness 04/26/2024     Priority: Medium    Antibiotic-associated diarrhea 04/26/2024     Priority: Medium    Pulmonary hypertension (H) 04/18/2024     Priority: Medium    Acute and chronic respiratory failure with hypoxia (H) 04/18/2024     Priority: Medium    Sepsis due to cellulitis (H) 04/18/2024     Priority: Medium    Morbid obesity (H) 03/16/2024     Priority: Medium    Lower GI bleed 03/16/2024     Priority: Medium    Prolonged Q-T interval on ECG 03/16/2024     Priority: Medium    Left leg cellulitis 03/16/2024     Priority: Medium    Acute on chronic respiratory failure with hypoxemia (H) 03/16/2024     Priority: Medium    Acute on chronic congestive heart failure, unspecified heart failure type (H) 03/16/2024     Priority: Medium    Sepsis, due to unspecified organism, unspecified whether acute organ dysfunction present (H) 03/16/2024     Priority: Medium    Obesity hypoventilation syndrome (H) 12/29/2023     Priority: Medium    Acute on chronic respiratory failure with hypoxia and hypercapnia (H) 12/26/2023     Priority: Medium    Pyelonephritis, acute 12/21/2023     Priority: Medium    Cellulitis of lower extremity, unspecified laterality 12/21/2023     Priority: Medium    Hypokalemia 05/04/2022     Priority: Medium    Recurrent UTI's 05/03/2022     Priority: Medium    Splenomegaly 04/06/2022     Priority: Medium    Hyponatremia 04/06/2022     Priority: Medium    Renal insufficiency 04/06/2022     Priority: Medium    Chronic heart failure with preserved ejection fraction (H) 03/09/2022     Priority: Medium    Ureteral stone 02/27/2022     Priority: Medium    Urinary tract infection 02/27/2022     Priority: Medium    Hydronephrosis with urinary obstruction due to ureteral calculus 02/27/2022     Priority: Medium    History of pulmonary embolism 02/24/2022     Priority: Medium    Dehydration 02/21/2022     Priority: Medium    Hyperkalemia 02/21/2022     Priority: Medium     Acute renal failure, unspecified acute renal failure type (H24) 02/21/2022     Priority: Medium    Hypotension due to hypovolemia 02/21/2022     Priority: Medium    Acute on chronic diastolic congestive heart failure (H) 02/09/2022     Priority: Medium    NSTEMI (non-ST elevated myocardial infarction) (H) 02/09/2022     Priority: Medium    Acute pulmonary embolism without acute cor pulmonale, unspecified pulmonary embolism type (H) 02/08/2022     Priority: Medium    Tachycardia 03/04/2021     Priority: Medium    Cellulitis of right leg 03/04/2021     Priority: Medium    Lymphedema of both lower extremities 06/15/2020     Priority: Medium    Generalized anxiety disorder 03/23/2020     Priority: Medium    History of abnormal cervical Pap smear 08/26/2019     Priority: Medium    Moderate episode of recurrent major depressive disorder (H) 03/23/2018     Priority: Medium    NARCISA on CPAP 08/22/2017     Priority: Medium     Severe NARCISA with sleep-associated hypoxemia, with likely REM-related hypoventilation  Polysomnography - Test date 8/9/2017  AHI 45.9, basline SpO2 92.9%, mike SpO2 54.9%, time of SpO2 <= 88% of 31.7 minutes.  Severe desaturations and sustained hypoxemia confined to singular supine REM period (no lateral REM observed for comparison).  Also seen to have TCM increase by ~15 mmHg over baseline in supine REM, consistent with hypoventilation.  Pre-study VBG was WNL.  CPAP titrated to 10 cm H2O and appeared optimal, including supine REM, with normalization of SpO2 and TCM normalized to low-40's.  Seen to have frequent PLM's (64.3 / hour on diagnostic, 89.6 / hour on treatment, ~20% associated with cortical arousals).        Benign essential hypertension 02/15/2016     Priority: Medium    Peroneal tendon rupture 11/23/2015     Priority: Medium    Class 3 severe obesity due to excess calories with serious comorbidity and body mass index (BMI) of 50.0 to 59.9 in adult (H) 08/24/2015     Priority: Medium     Oct  2016:  Starting [facility] program in [place], goal to lose 140 lbs over next 2 years      Papanicolaou smear of cervix with low grade squamous intraepithelial lesion (LGSIL) 08/03/2014     Priority: Medium     7/29/2014:Pap--LSIL. Neg high risk HPV. Plan cotest in 1 year (if this is ASCUS or LSIL then colp). In reminders  8/20/15: Pap - ASCUS, Neg HPV. Plan colp  9/14/15: [place] Bx & ECC - negative. Plan cotest in 1 year.   10/20/16: NIL Pap, Neg HPV. Plan cotest in 3 years.       Eating disorder 08/25/2013     Priority: Medium     Binge-eating disorder; social phobia  See psychological assessment from [facility] [place] [facility], Dr. Anupama Bowie, 8/14/2013  Problem list name updated by automated process. Provider to review      Leg edema 04/19/2013     Priority: Medium    Anxiety 06/28/2011     Priority: Medium     Followed by neurologist      Restless legs 06/28/2011     Priority: Medium    CARDIOVASCULAR SCREENING; LDL GOAL LESS THAN 160 10/31/2010     Priority: Medium    Constipation 09/18/2006     Priority: Medium     9/18/2006    Has tried otc suppository and otc lax.   Problem list name updated by automated process. Provider to review      Multiple sclerosis (H) 08/29/2005     Priority: Medium     9/18/200pt dx with MS 2004--dx by neurolpau and is followed by Dr. Jeet Nicholson, also MS nurse Heather Thomas      Polycystic ovaries 08/29/2005     Priority: Medium     Diagnosed in [place], had facial hair, overweight, carb craving, records being requested, never on metformin        Past Medical History:   Diagnosis Date    Acute on chronic diastolic congestive heart failure (H) 02/09/2022    Arthritis 2019    Hips    ASCUS favor benign 08/2015    Neg HPV    Depressive disorder 2010    H/O colposcopy with cervical biopsy 09/14/2015    Bx & ECC - negative    Hypertension 2019    LSIL on Pap smear 07/2014    Neg high risk HPV    Multiple sclerosis (H) 08/29/2005 9/18/200pt dx with MS 2004--dx by neurolgist and is  followed by Dr. Harris    Myocardial infarction (H)     Obese     Pneumonia due to infectious organism, unspecified laterality, unspecified part of lung 02/08/2022    Sepsis (Temp 101, , WBC 13.0) 10/23/2018    Shingles 10/2016    SIRS (systemic inflammatory response syndrome) (H) 03/04/2021    Sleep apnea     UNSPEC CONSTIPATION 09/18/2006 9/18/2006   Has tried otc suppository and otc lax.      Home Medications:  Current Facility-Administered Medications   Medication Dose Route Frequency Provider Last Rate Last Admin    acetaminophen (TYLENOL) tablet 650 mg  650 mg Oral Q4H PRN EllisHoward anaya-Polo, DO   650 mg at 07/19/24 0204    Or    acetaminophen (TYLENOL) oral liquid 650 mg  650 mg Per NG tube Q4H PRN EllisHoward anaya-Polo, DO        aspirin EC tablet 81 mg  81 mg Oral Daily Ellis, Howard-Polo, DO        calcium carbonate (TUMS) chewable tablet 1,000 mg  1,000 mg Oral 4x Daily PRN Teja Monet MD        carboxymethylcellulose PF (REFRESH PLUS) 0.5 % ophthalmic solution 1 drop  1 drop Both Eyes Q1H PRN Ellis, Howard-Polo, DO        clindamycin (CLEOCIN) 900 mg in 50 mL D5W intermittent infusion  900 mg Intravenous Q8H EllisHoward anaya-Polo, DO        glucose gel 15-30 g  15-30 g Oral Q15 Min PRN Ellis, Howard-Polo, DO        Or    dextrose 50 % injection 25-50 mL  25-50 mL Intravenous Q15 Min PRN Howard Ellis-Polo, DO        Or    glucagon injection 1 mg  1 mg Subcutaneous Q15 Min PRN Ellis, Howard-Polo, DO        glucose gel 15-30 g  15-30 g Oral Q15 Min PRN Ellis, Howard-Polo, DO        Or    dextrose 50 % injection 25-50 mL  25-50 mL Intravenous Q15 Min PRN Ellis, Howard-Polo, DO        Or    glucagon injection 1 mg  1 mg Subcutaneous Q15 Min PRN Ellis, Howard-Polo, DO        enoxaparin ANTICOAGULANT (LOVENOX) injection 40 mg  40 mg Subcutaneous Q12H EllisHoward anaya-Polo, DO   40 mg at 07/19/24 0208    lidocaine (LMX4) kit   Topical Q1H PRN Ian Ellis DO        lidocaine 1 % 0.1-5 mL  0.1-5 mL  Other Q1H PRN Howard Ellis-Polo, DO        No lozenges or gum should be given while patient on BIPAP/AVAPS/AVAPS AE   Does not apply Continuous PRN Howard Ellis-Polo, DO        norepinephrine (LEVOPHED) 4 mg in  mL infusion PREMIX  0.01-0.6 mcg/kg/min Intravenous Continuous Ellis, Howard-Polo, DO        nystatin (MYCOSTATIN) cream   Topical BID Howard Ellis-Polo, DO        ondansetron (ZOFRAN ODT) ODT tab 4 mg  4 mg Oral Q6H PRN Howard Ellis-Polo, DO        Or    ondansetron (ZOFRAN) injection 4 mg  4 mg Intravenous Q6H PRN Rhonda, Howard-Polo, DO        Patient may continue current oral medications   Does not apply Continuous PRN Rhonda, Howard-Polo, DO        piperacillin-tazobactam (ZOSYN) 3.375 g vial to attach to  mL bag  3.375 g Intravenous Q6H Howard Ellis-Polo, DO        prochlorperazine (COMPAZINE) injection 10 mg  10 mg Intravenous Q6H PRN Howard Ellis-Polo, DO        Or    prochlorperazine (COMPAZINE) tablet 10 mg  10 mg Oral Q6H PRN Rhonda, Howard-Polo, DO        Or    prochlorperazine (COMPAZINE) suppository 25 mg  25 mg Rectal Q12H PRN Howard Ellis-Polo, DO        sertraline (ZOLOFT) tablet 100 mg  100 mg Oral Daily Ivet Ellisg, DO        sodium chloride (PF) 0.9% PF flush 10-40 mL  10-40 mL Intracatheter Once PRN Howard Ellis-Polo, DO        sodium chloride 0.9 % infusion   Intravenous Continuous Agapito Urias MD 10 mL/hr at 07/19/24 0156 10 mL/hr at 07/19/24 0156    sodium chloride 0.9 % infusion  1,000 mL Intravenous Continuous EllisHoward anaya-Polo, DO        sodium chloride 0.9% BOLUS 500 mL  500 mL Intravenous Once PRN Howard Ellis-Polo, DO        vancomycin (VANCOCIN) 1,250 mg in sodium chloride 0.9 % 250 mL intermittent infusion  1,250 mg Intravenous Q12H Ian Ellis,          Allergies   Allergen Reactions    Nkda [No Known Drug Allergy]      Past Surgical History:   Procedure Laterality Date    CHOLECYSTECTOMY, LAPOROSCOPIC      Cholecystectomy,  Laparoscopic    COLONOSCOPY  2007?    Bathroom issue..results normal    COMBINED CYSTOSCOPY, RETROGRADES, EXCHANGE STENT URETER(S) Right 2/21/2022    Procedure: CYSTOSCOPY, WITH right RETROGRADE PYELOGRAM AND right URETERAL STENT PLACEMENT;  Surgeon: Doyle Palomares MD;  Location: [facility] Main OR    OPEN REDUCTION INTERNAL FIXATION TOE(S)  4/13/2012    Procedure:OPEN REDUCTION INTERNAL FIXATION TOE(S); Open reduction internal fixation right proximal fifth metatarsal fracture-   Anes-choice block; Surgeon:LEY, JEFFREY DUANE; Location:[facility] OR    PICC SINGLE LUMEN PLACEMENT  3/20/2024    PICC TRIPLE LUMEN PLACEMENT  2/21/2022          Family History   Problem Relation Age of Onset    Neurologic Disorder Father         MS    Heart Disease Father         irregular heart rate    Diabetes Father     Melanoma Father     Sleep Apnea Father     Other Cancer Father     Cancer Maternal Grandfather         lung    Other Cancer Maternal Grandfather     Cancer Paternal Grandmother         stomach    Other Cancer Paternal Grandmother     Cancer Mother     Other Cancer Mother     Thyroid Disease Mother     Gynecology Maternal Aunt         PCOS    Hypertension Maternal Grandmother     Anxiety Disorder Maternal Grandmother     Thyroid Disease Maternal Grandmother     Anxiety Disorder Sister      Social History     Socioeconomic History    Marital status: Single     Spouse name: Not on file    Number of children: Not on file    Years of education: Not on file    Highest education level: Not on file   Occupational History     Employer: [employer]   Tobacco Use    Smoking status: Never    Smokeless tobacco: Never   Vaping Use    Vaping status: Never Used   Substance and Sexual Activity    Alcohol use: Yes     Comment: social    Drug use: No    Sexual activity: Not Currently     Partners: Male     Birth control/protection: Pill   Other Topics Concern    Parent/sibling w/ CABG, MI or angioplasty before 65F 55M? No   Social History  Narrative    , 3rd-5th grade     Social Determinants of Health     Financial Resource Strain: Low Risk  (9/25/2023)    Financial Resource Strain     Within the past 12 months, have you or your family members you live with been unable to get utilities (heat, electricity) when it was really needed?: No   Food Insecurity: Low Risk  (3/21/2024)    Food Insecurity     Within the past 12 months, did you worry that your food would run out before you got money to buy more?: No     Within the past 12 months, did the food you bought just not last and you didn t have money to get more?: No   Transportation Needs: Low Risk  (3/21/2024)    Transportation Needs     Within the past 12 months, has lack of transportation kept you from medical appointments, getting your medicines, non-medical meetings or appointments, work, or from getting things that you need?: No   Physical Activity: Inactive (3/21/2024)    Exercise Vital Sign     Days of Exercise per Week: 0 days     Minutes of Exercise per Session: 0 min   Stress: No Stress Concern Present (12/4/2020)    [employer] [place] [employer] - Occupational Stress Questionnaire     Feeling of Stress : Only a little   Social Connections: Unknown (3/1/2022)    Social Connection and Isolation Panel [NHANES]     Frequency of Communication with Friends and Family: Twice a week     Frequency of Social Gatherings with Friends and Family: Twice a week     Attends Jew Services: Not on file     Active Member of Clubs or Organizations: Not on file     Attends Club or Organization Meetings: Not on file     Marital Status: Not on file   Interpersonal Safety: Low Risk  (9/27/2023)    Interpersonal Safety     Do you feel physically and emotionally safe where you currently live?: Yes     Within the past 12 months, have you been hit, slapped, kicked or otherwise physically hurt by someone?: No     Within the past 12 months, have you been humiliated or emotionally  abused in other ways by your partner or ex-partner?: No   Housing Stability: Low Risk  (3/21/2024)    Housing Stability     Do you have housing? : Yes     Are you worried about losing your housing?: No       Review of Systems  All ten systems were reviewed and negative except as detailed under HPI.      Vitals:     B/P: 86/50, T: 100.5[fan in a room, temp decreased[, P: 95, R: 24       Physical Exam:  General appearance: AAOx3, obese  HEENT: Normocephalic, atraumatic, PERRL, EOMI  Neck: Supple, no JVD, non-tender  Cardiac: RRR, normal S1 and S2  Respiratory/Chest: No rhonchi, rales or wheezing. Equal breath sounds bilaterally  GI/Abdomen: +BS in all 4 quadrants, soft, non-tender, non-distended, no guarding, no rebound  Musculoskeletal/Extremities: unable to evaluate  Neuro: CN 2-12 intact, strength and sensation intact and equal  Psych: Cooperative  Skin: bilateral thighs erythematous, with multiple sites with oozing. Multiple skin folds on chest, abdomen with erythema    Lab Review:  Results for orders placed or performed during the hospital encounter of 07/18/24   POC US CHEST B-SCAN     Status: Abnormal    Impression    [facility] Procedure Note      Limited Bedside ED Cardiac Ultrasound:    PROCEDURE: PERFORMED BY: Dr. Spenser Turner MD  INDICATIONS/SYMPTOM:  Shortness of Breath  PROBE: Cardiac phased array probe  BODY LOCATION: Chest  FINDINGS:     Very difficult views due to body habitus.  I could visualize the IVC and appears to be about 2+ cm dilated -and it collapses to about 1 cm.  Patient was partial cardiac walls and they appear to be hyperkinetic.                                             INTERPRETATION: Difficult views.  IVC does appear to be at least 2 cm dilated and collapses to about 1 cm in consistent likely with normovolemia at the start the remainder of the echocardiogram was difficult to interpret due to body habitus.    I had difficulty also visualizing the sliding lung sign.    IMAGE  DOCUMENTATION: Images were archived to PACs system.         XR Chest Port 1 View     Status: None    Narrative    EXAM: XR CHEST PORT 1 VIEW  LOCATION: [facility]  DATE: 7/18/2024    INDICATION: Respiratory Failure  COMPARISON: Chest CT 4/18/2024      Impression    IMPRESSION: Stable enlargement of the cardiac silhouette. Possible pulmonary vascular congestion without patricia consolidation, pleural effusion or pneumothorax. No acute bony abnormality.   CT Chest (PE) Abdomen Pelvis w Contrast     Status: None    Narrative    EXAM: CT CHEST PE ABDOMEN PELVIS W CONTRAST  LOCATION: [facility]  DATE: 7/18/2024    INDICATION: Acute hypoxic respiratory failure and sepsis.  COMPARISON: Multiple priors with most recent 4/26/2024.  TECHNIQUE: CT chest pulmonary angiogram and routine CT abdomen pelvis with IV contrast. Arterial phase through the chest and venous phase through the abdomen and pelvis. Multiplanar reformats and MIP reconstructions were performed. Dose reduction   techniques were used.   CONTRAST: 146 mL Isovue 370    FINDINGS:  ANGIOGRAM CHEST: Enlargement of the central pulmonary arteries. No evidence for pulmonary embolism. Normal caliber thoracic aorta without dissection.    LUNGS AND PLEURA: Mosaic attenuation throughout the pulmonary parenchyma. No consolidation, pleural fluid or pneumothorax. Calcified granulomas bilaterally.    MEDIASTINUM/AXILLAE: Cardiac enlargement with biatrial enlargement. No pericardial fluid. No lymphadenopathy.    CORONARY ARTERY CALCIFICATION: None.    HEPATOBILIARY: Hepatomegaly. Diffuse hepatic steatosis. Suggestion of surface contour nodularity right hepatic lobe. Cholecystectomy. No biliary dilatation.    PANCREAS: Normal.    SPLEEN: Normal.    ADRENAL GLANDS: Normal.    KIDNEYS/BLADDER: Symmetric renal enhancement. Nonobstructing 3 mm calculus left upper renal pole. No urinary collecting system dilatation. Mild contrast  excretion into the bladder. Bladder decompressed.    BOWEL: No obstruction or inflammatory change. Appendix unremarkable.    LYMPH NODES: Minimal interval increase in size of retroperitoneal lymph nodes in the upper abdomen. For example a lymph node in the periaortic space measures 0.8 cm, previously 0.6 cm. Multiple other lymph nodes also demonstrate a mild increase in size.    VASCULATURE: Normal caliber aorta. Minimal vascular calcification.    PELVIC ORGANS: Unchanged right adnexal dermoid. No surrounding inflammatory change. No free fluid.    MUSCULOSKELETAL: Skin thickening and subcutaneous edema involving the patient's anterior abdominal/pelvic wall. Degenerative hypertrophic changes in the spine.      Impression    IMPRESSION:  1.  No pulmonary embolism. Enlargement of central pulmonary arteries which can be seen with pulmonary arterial hypertension.    2.  Normal caliber aorta without dissection.    3.  Diffuse mosaic attenuation throughout the pulmonary parenchyma. These findings can be seen with air trapping related to reactive airways disease or viral etiologies.    4.  Hepatomegaly with diffuse hepatic steatosis.    5.  Nonobstructing 3 mm calculus left upper renal pole.    6.  Unchanged right adnexal dermoid cyst. No free fluid or surrounding inflammatory change.   CT Femur Thigh Right w Contrast     Status: None    Narrative    EXAM: CT FEMUR THIGH RIGHT W CONTRAST  LOCATION: [facility]  DATE: 7/18/2024    INDICATION: 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 Abrupt sepsis with extensive chronic lymphedema.  COMPARISON: Ultrasound venous bilateral lower extremity Doppler 3/16/2024.  TECHNIQUE: IV contrast. Axial, sagittal and coronal thin-section reconstruction. Dose reduction techniques were used.   CONTRAST: 146 mL Isovue-370 IV injected for CT pulmonary angiogram, CT abdomen and pelvis with IV contrast, no additional contrast injected for CT right  femur.    FINDINGS:     BONES:  -Anatomic alignment of the right femur without fracture, dislocation, lytic or destructive process. Mild degenerative changes right hip and visualized knee joint.    SOFT TISSUES:  -Right ovarian fatty dermoid. Superficial soft tissue edema involving the ventral abdominal wall fat. Diffuse soft tissue edema involving the right thigh to the edge of field-of-view. No discrete fluid collection to suggest abscess formation. No opaque   foreign body or foci of soft tissue gas. Multiple superficial varicose veins. Partially visualized soft tissue edema involving the medial left thigh (please see separate CT left femur report).      Impression    IMPRESSION:  1.  Diffuse soft tissue edema involving the right thigh, more apparent distally. Partially visualized soft tissue edema involving the left thigh (please see separate CT left femur report). Superficial soft tissue edema involving the ventral abdominal   wall fat anteroinferiorly. No discrete fluid collection, opaque foreign body or soft tissue gas.    2.  Right ovarian fatty dermoid.     CT Femur Thigh Left with Contrast     Status: None    Narrative    EXAM: CT FEMUR THIGH LEFT WITH CONTRAST  LOCATION: [facility]  DATE: 7/18/2024    INDICATION: Lower extremity swelling.  COMPARISON: None available.  TECHNIQUE: IV contrast. Axial, sagittal and coronal thin-section reconstruction. Dose reduction techniques were used.   CONTRAST: 146 mL Isovue-370.    FINDINGS:     There is diffuse soft tissue swelling throughout the cutaneous and subcutaneous tissues of the visualized left thigh, greatest medially. This extends deep to abut the superficial fascia, without evidence of deep fascial involvement. Muscularity appears   normal in bulk and attenuation. No evidence of abscess or soft tissue gas. Of note, soft tissues is incompletely visualized distally into the lower leg, due to exclusion from field-of-view.    Milder  soft tissue swelling is noted within the medial aspect of the right thigh.    No CT evidence of acute fracture or malalignment. Mild polyarticular osteoarthritic. No knee joint effusion.    No identifiable left inguinal lymphadenopathy. A large dermoid cyst is present within the right adnexa, measuring up to 9.3 cm and best appreciated on right thigh CT.      Impression    IMPRESSION:  1.  Diffuse soft tissue swelling throughout the cutaneous and subcutaneous tissues of the visualized left thigh, greatest medially. Findings may be attributable to a bland edema pattern or cellulitis. No evidence of abscess or soft tissue gas.  2.  Milder soft tissue swelling within the medial aspect of the right thigh.  3.  Large right adnexal dermoid cyst, measuring up to 9.3 cm. Gynecologic consultation is recommended.     Extra Tube ([facility] Draw) *Canceled*     Status: None ()    Narrative    The following orders were created for panel order Extra Tube ([facility] Draw).  Procedure                               Abnormality         Status                     ---------                               -----------         ------                       Please view results for these tests on the individual orders.   Blood gas venous     Status: Abnormal   Result Value Ref Range    pH Venous 7.33 7.32 - 7.43    pCO2 Venous 51 (H) 40 - 50 mm Hg    pO2 Venous 21 (L) 25 - 47 mm Hg    Bicarbonate Venous 27 21 - 28 mmol/L    Base Excess/Deficit Venous -0.5 -3.0 - 3.0 mmol/L    FIO2 8     Oxyhemoglobin Venous 29 (L) 70 - 75 %    O2 Sat, Venous 29.4 (L) 70.0 - 75.0 %    Narrative    In healthy individuals, oxyhemoglobin (O2Hb) and oxygen saturation (SO2) are approximately equal. In the presence of dyshemoglobins, oxyhemoglobin can be considerably lower than oxygen saturation.   Comprehensive metabolic panel     Status: Abnormal   Result Value Ref Range    Sodium 135 135 - 145 mmol/L    Potassium 5.3 3.4 - 5.3 mmol/L    Carbon Dioxide (CO2) 22 22 -  29 mmol/L    Anion Gap 14 7 - 15 mmol/L    Urea Nitrogen 13.7 6.0 - 20.0 mg/dL    Creatinine 0.73 0.51 - 0.95 mg/dL    GFR Estimate >90 >60 mL/min/1.73m2    Calcium 10.2 8.8 - 10.4 mg/dL    Chloride 99 98 - 107 mmol/L    Glucose 99 70 - 99 mg/dL    Alkaline Phosphatase 83 40 - 150 U/L    AST 34 0 - 45 U/L    ALT 8 0 - 50 U/L    Protein Total 9.3 (H) 6.4 - 8.3 g/dL    Albumin 4.0 3.5 - 5.2 g/dL    Bilirubin Total 2.7 (H) <=1.2 mg/dL   Lactic acid whole blood     Status: Abnormal   Result Value Ref Range    Lactic Acid 4.9 (HH) 0.7 - 2.0 mmol/L   UA with Microscopic reflex to Culture     Status: Abnormal    Specimen: Urine, Catheter   Result Value Ref Range    Color Urine [place] (A) Colorless, Straw, Light Yellow, Yellow    Appearance Urine Cloudy (A) Clear    Glucose Urine 50 (A) Negative mg/dL    Bilirubin Urine Negative Negative    Ketones Urine Negative Negative mg/dL    Specific Gravity Urine 1.024 1.003 - 1.035    Blood Urine Large (A) Negative    pH Urine 5.0 5.0 - 7.0    Protein Albumin Urine 100 (A) Negative mg/dL    Urobilinogen Urine 2.0 Normal, 2.0 mg/dL    Nitrite Urine Negative Negative    Leukocyte Esterase Urine Large (A) Negative    Bacteria Urine Few (A) None Seen /HPF    WBC Clumps Urine Present (A) None Seen /HPF    Budding Yeast Urine Moderate (A) None Seen /HPF    Mucus Urine Present (A) None Seen /LPF    RBC Urine 161 (H) <=2 /HPF    WBC Urine >182 (H) <=5 /HPF    Squamous Epithelials Urine 6 (H) <=1 /HPF    Narrative    Urine Culture ordered based on laboratory criteria   Extra Tube ([facility] Draw)     Status: None    Narrative    The following orders were created for panel order Extra Tube ([facility] Draw).  Procedure                               Abnormality         Status                     ---------                               -----------         ------                     Extra Blood Culture Bottle[[id number]]                       Final result               Extra Blue Top Tube[[id number]]                               Final result                 Please view results for these tests on the individual orders.   CBC with platelets and differential     Status: Abnormal   Result Value Ref Range    WBC Count 9.8 4.0 - 11.0 10e3/uL    RBC Count 5.72 (H) 3.80 - 5.20 10e6/uL    Hemoglobin 15.9 (H) 11.7 - 15.7 g/dL    Hematocrit 50.6 (H) 35.0 - 47.0 %    MCV 89 78 - 100 fL    MCH 27.8 26.5 - 33.0 pg    MCHC 31.4 (L) 31.5 - 36.5 g/dL    RDW 18.6 (H) 10.0 - 15.0 %    Platelet Count 201 150 - 450 10e3/uL    % Neutrophils 85 %    % Lymphocytes 8 %    % Monocytes 5 %    % Eosinophils 1 %    % Basophils 1 %    % Immature Granulocytes 0 %    NRBCs per 100 WBC 0 <1 /100    Absolute Neutrophils 8.3 1.6 - 8.3 10e3/uL    Absolute Lymphocytes 0.8 0.8 - 5.3 10e3/uL    Absolute Monocytes 0.5 0.0 - 1.3 10e3/uL    Absolute Eosinophils 0.1 0.0 - 0.7 10e3/uL    Absolute Basophils 0.1 0.0 - 0.2 10e3/uL    Absolute Immature Granulocytes 0.0 <=0.4 10e3/uL    Absolute NRBCs 0.0 10e3/uL   Extra Blood Culture Bottle     Status: None   Result Value Ref Range    Hold Specimen JIC    Extra Blue Top Tube     Status: None   Result Value Ref Range    Hold Specimen jic    Symptomatic Influenza A/B, RSV, & SARS-CoV2 PCR (COVID-19) Nose     Status: Normal    Specimen: Nose; Swab   Result Value Ref Range    Influenza A PCR Negative Negative    Influenza B PCR Negative Negative    RSV PCR Negative Negative    SARS CoV2 PCR Negative Negative    Narrative    Testing was performed using the Xpert Xpress CoV2/Flu/RSV Assay on the [employer] GeneXpert Instrument. This test should be ordered for the detection of SARS-CoV-2, influenza, and RSV viruses in individuals who meet clinical and/or epidemiological criteria. Test performance is unknown in asymptomatic patients. This test is for in vitro diagnostic use under the FDA EUA for laboratories certified under CLIA to perform high or moderate complexity testing. This test has not been FDA cleared or approved.  A negative result does not rule out the presence of PCR inhibitors in the specimen or target RNA in concentration below the limit of detection for the assay. If only one viral target is positive but coinfection with multiple targets is suspected, the sample should be re-tested with another FDA cleared, approved, or authorized test, if coinfection would change clinical management. This test was validated by the [facility]. These laboratories are certified under the Clinical Laboratory Improvement Amendments of 1988 (CLIA-88) as qualified to perform high complexity laboratory testing.   Procalcitonin     Status: Normal   Result Value Ref Range    Procalcitonin 0.06 <0.50 ng/mL   Glucose by meter     Status: Abnormal   Result Value Ref Range    GLUCOSE BY METER POCT 101 (H) 70 - 99 mg/dL   CBC with platelets, differential     Status: Abnormal    Narrative    The following orders were created for panel order CBC with platelets, differential.  Procedure                               Abnormality         Status                     ---------                               -----------         ------                     CBC with platelets and d...[[id number]]  Abnormal            Final result                 Please view results for these tests on the individual orders.       ASSESSMENT:  Principal Problem:    Sepsis, due to unspecified organism, unspecified whether acute organ dysfunction present (H)  Active Problems:    Multiple sclerosis (H)    Leg edema    Class 3 severe obesity due to excess calories with serious comorbidity and body mass index (BMI) of 50.0 to 59.9 in adult (H)    NARCISA on CPAP    Moderate episode of recurrent major depressive disorder (H)    Chronic heart failure with preserved ejection fraction (H)    Acute on chronic respiratory failure with hypoxemia (H)    Cellulitis of left lower extremity    Urinary tract infection without hematuria, site unspecified       PLAN:   1) Acute  sepsis  Secondary to BLE cellulitis, urinary tract infection  On IV antibiotics, IV fluids  Follow up cultures  Continue to monitor    2) Hypotension  On IV fluids  Maintain MAP > 65  Levophed prn  Continue to monitor    3) Acute on chronic respiratory failure with hypoxemia  Patient uses 2 liters O2 at home for her CHF  Presented in ED on 10 liters  Currently on Bipap continuous  Monitor ABG  Wean as tolerated    4) Diastolic CHF  Currently patient is on IV fluids  Holding home diuretics   Monitor volume status carefully    5) BLE cellulitis, skin folds wounds  On IV antibiotics, wound care consult  Nystatin cream for skin folds    Medication Reconciliation Complete: yes  DVT prophylaxis: Lovenox  Dispostition: 1003  Code Status: Full    Level of Care: ICU    Patient meets inpatient: critical condition  Related to: infection, respiratory failure  Due to risk factors:  respiratory failure  Estimated length of stay:  > 2 midnights  Further management will be based on patient's clinical course.       I performed this consultation using real-time telehealth tools, including a live video connection between my location ([place], [place]) and the patient's location. My location is a different campus than the patient's campus.  Video start time: 07/19/2024 02:09    Video end time: 07/19/2024 02:23    As the provider for this telehealth service, I attest that I introduced myself to the patient, provided my credentials, disclosed my location, and determined that, based on a review of the patients chart and/or a discussion with members of the patient's treatment team, telemedicine via a real-time, two-way, interactive audio and video platform is an appropriate and effective means of providing this service. The patient and I mutually agree this visit is appropriate for telemedicine as well.      Ian Ellis,   July 19, 2024

## 2024-07-20 ENCOUNTER — APPOINTMENT (OUTPATIENT)
Dept: OCCUPATIONAL THERAPY | Facility: CLINIC | Age: 49
DRG: 871 | End: 2024-07-20
Attending: INTERNAL MEDICINE
Payer: MEDICARE

## 2024-07-20 LAB
ALBUMIN SERPL BCG-MCNC: 3 G/DL (ref 3.5–5.2)
ALBUMIN SERPL BCG-MCNC: 3 G/DL (ref 3.5–5.2)
ALP SERPL-CCNC: 61 U/L (ref 40–150)
ALT SERPL W P-5'-P-CCNC: <5 U/L (ref 0–50)
ANION GAP SERPL CALCULATED.3IONS-SCNC: 8 MMOL/L (ref 7–15)
AST SERPL W P-5'-P-CCNC: 21 U/L (ref 0–45)
BASOPHILS # BLD AUTO: 0.1 10E3/UL (ref 0–0.2)
BASOPHILS NFR BLD AUTO: 1 %
BILIRUB DIRECT SERPL-MCNC: 0.65 MG/DL (ref 0–0.3)
BILIRUB SERPL-MCNC: 3.3 MG/DL
BUN SERPL-MCNC: 14 MG/DL (ref 6–20)
CALCIUM SERPL-MCNC: 9.5 MG/DL (ref 8.8–10.4)
CHLORIDE SERPL-SCNC: 102 MMOL/L (ref 98–107)
CREAT SERPL-MCNC: 0.73 MG/DL (ref 0.51–0.95)
EGFRCR SERPLBLD CKD-EPI 2021: >90 ML/MIN/1.73M2
EOSINOPHIL # BLD AUTO: 0.1 10E3/UL (ref 0–0.7)
EOSINOPHIL NFR BLD AUTO: 1 %
ERYTHROCYTE [DISTWIDTH] IN BLOOD BY AUTOMATED COUNT: 18.3 % (ref 10–15)
GLUCOSE BLDC GLUCOMTR-MCNC: 54 MG/DL (ref 70–99)
GLUCOSE BLDC GLUCOMTR-MCNC: 82 MG/DL (ref 70–99)
GLUCOSE BLDC GLUCOMTR-MCNC: 85 MG/DL (ref 70–99)
GLUCOSE BLDC GLUCOMTR-MCNC: 87 MG/DL (ref 70–99)
GLUCOSE BLDC GLUCOMTR-MCNC: 87 MG/DL (ref 70–99)
GLUCOSE BLDC GLUCOMTR-MCNC: 88 MG/DL (ref 70–99)
GLUCOSE SERPL-MCNC: 90 MG/DL (ref 70–99)
HCO3 SERPL-SCNC: 24 MMOL/L (ref 22–29)
HCT VFR BLD AUTO: 40.4 % (ref 35–47)
HGB BLD-MCNC: 12.9 G/DL (ref 11.7–15.7)
IMM GRANULOCYTES # BLD: 0 10E3/UL
IMM GRANULOCYTES NFR BLD: 0 %
LYMPHOCYTES # BLD AUTO: 0.8 10E3/UL (ref 0.8–5.3)
LYMPHOCYTES NFR BLD AUTO: 11 %
MCH RBC QN AUTO: 28.7 PG (ref 26.5–33)
MCHC RBC AUTO-ENTMCNC: 31.9 G/DL (ref 31.5–36.5)
MCV RBC AUTO: 90 FL (ref 78–100)
MONOCYTES # BLD AUTO: 0.5 10E3/UL (ref 0–1.3)
MONOCYTES NFR BLD AUTO: 6 %
NEUTROPHILS # BLD AUTO: 5.8 10E3/UL (ref 1.6–8.3)
NEUTROPHILS NFR BLD AUTO: 80 %
NRBC # BLD AUTO: 0 10E3/UL
NRBC BLD AUTO-RTO: 0 /100
PHOSPHATE SERPL-MCNC: 2.7 MG/DL (ref 2.5–4.5)
PLATELET # BLD AUTO: 123 10E3/UL (ref 150–450)
POTASSIUM SERPL-SCNC: 3.8 MMOL/L (ref 3.4–5.3)
PROT SERPL-MCNC: 7 G/DL (ref 6.4–8.3)
RBC # BLD AUTO: 4.5 10E6/UL (ref 3.8–5.2)
SODIUM SERPL-SCNC: 134 MMOL/L (ref 135–145)
VANCOMYCIN SERPL-MCNC: 13 UG/ML
WBC # BLD AUTO: 7.2 10E3/UL (ref 4–11)

## 2024-07-20 PROCEDURE — 94660 CPAP INITIATION&MGMT: CPT

## 2024-07-20 PROCEDURE — 80069 RENAL FUNCTION PANEL: CPT | Performed by: STUDENT IN AN ORGANIZED HEALTH CARE EDUCATION/TRAINING PROGRAM

## 2024-07-20 PROCEDURE — 258N000003 HC RX IP 258 OP 636: Performed by: STUDENT IN AN ORGANIZED HEALTH CARE EDUCATION/TRAINING PROGRAM

## 2024-07-20 PROCEDURE — 999N000215 HC STATISTIC HFNC ADULT NON-CPAP

## 2024-07-20 PROCEDURE — 250N000013 HC RX MED GY IP 250 OP 250 PS 637: Performed by: INTERNAL MEDICINE

## 2024-07-20 PROCEDURE — 258N000003 HC RX IP 258 OP 636: Performed by: INTERNAL MEDICINE

## 2024-07-20 PROCEDURE — 36415 COLL VENOUS BLD VENIPUNCTURE: CPT | Performed by: STUDENT IN AN ORGANIZED HEALTH CARE EDUCATION/TRAINING PROGRAM

## 2024-07-20 PROCEDURE — 82247 BILIRUBIN TOTAL: CPT | Performed by: STUDENT IN AN ORGANIZED HEALTH CARE EDUCATION/TRAINING PROGRAM

## 2024-07-20 PROCEDURE — 97166 OT EVAL MOD COMPLEX 45 MIN: CPT | Mod: GO

## 2024-07-20 PROCEDURE — 250N000011 HC RX IP 250 OP 636: Performed by: STUDENT IN AN ORGANIZED HEALTH CARE EDUCATION/TRAINING PROGRAM

## 2024-07-20 PROCEDURE — 99233 SBSQ HOSP IP/OBS HIGH 50: CPT | Performed by: STUDENT IN AN ORGANIZED HEALTH CARE EDUCATION/TRAINING PROGRAM

## 2024-07-20 PROCEDURE — 85025 COMPLETE CBC W/AUTO DIFF WBC: CPT | Performed by: STUDENT IN AN ORGANIZED HEALTH CARE EDUCATION/TRAINING PROGRAM

## 2024-07-20 PROCEDURE — 250N000011 HC RX IP 250 OP 636: Performed by: INTERNAL MEDICINE

## 2024-07-20 PROCEDURE — 97535 SELF CARE MNGMENT TRAINING: CPT | Mod: GO

## 2024-07-20 PROCEDURE — 80202 ASSAY OF VANCOMYCIN: CPT | Performed by: STUDENT IN AN ORGANIZED HEALTH CARE EDUCATION/TRAINING PROGRAM

## 2024-07-20 PROCEDURE — 999N000157 HC STATISTIC RCP TIME EA 10 MIN

## 2024-07-20 PROCEDURE — 87040 BLOOD CULTURE FOR BACTERIA: CPT | Performed by: STUDENT IN AN ORGANIZED HEALTH CARE EDUCATION/TRAINING PROGRAM

## 2024-07-20 PROCEDURE — 120N000013 HC R&B IMCU

## 2024-07-20 RX ORDER — SODIUM CHLORIDE, SODIUM LACTATE, POTASSIUM CHLORIDE, CALCIUM CHLORIDE 600; 310; 30; 20 MG/100ML; MG/100ML; MG/100ML; MG/100ML
INJECTION, SOLUTION INTRAVENOUS CONTINUOUS
Status: ACTIVE | OUTPATIENT
Start: 2024-07-20 | End: 2024-07-20

## 2024-07-20 RX ADMIN — ENOXAPARIN SODIUM 40 MG: 40 INJECTION SUBCUTANEOUS at 00:36

## 2024-07-20 RX ADMIN — PIPERACILLIN AND TAZOBACTAM 3.38 G: 3; .375 INJECTION, POWDER, FOR SOLUTION INTRAVENOUS at 08:16

## 2024-07-20 RX ADMIN — VANCOMYCIN HYDROCHLORIDE 1250 MG: 5 INJECTION, POWDER, LYOPHILIZED, FOR SOLUTION INTRAVENOUS at 09:37

## 2024-07-20 RX ADMIN — NYSTATIN: 100000 CREAM TOPICAL at 20:53

## 2024-07-20 RX ADMIN — VANCOMYCIN HYDROCHLORIDE 1250 MG: 5 INJECTION, POWDER, LYOPHILIZED, FOR SOLUTION INTRAVENOUS at 22:06

## 2024-07-20 RX ADMIN — PIPERACILLIN AND TAZOBACTAM 3.38 G: 3; .375 INJECTION, POWDER, FOR SOLUTION INTRAVENOUS at 14:32

## 2024-07-20 RX ADMIN — NYSTATIN: 100000 CREAM TOPICAL at 07:47

## 2024-07-20 RX ADMIN — PIPERACILLIN AND TAZOBACTAM 3.38 G: 3; .375 INJECTION, POWDER, FOR SOLUTION INTRAVENOUS at 02:51

## 2024-07-20 RX ADMIN — ENOXAPARIN SODIUM 40 MG: 40 INJECTION SUBCUTANEOUS at 14:31

## 2024-07-20 RX ADMIN — ASPIRIN 81 MG: 81 TABLET, COATED ORAL at 07:47

## 2024-07-20 RX ADMIN — SODIUM CHLORIDE, POTASSIUM CHLORIDE, SODIUM LACTATE AND CALCIUM CHLORIDE: 600; 310; 30; 20 INJECTION, SOLUTION INTRAVENOUS at 09:34

## 2024-07-20 RX ADMIN — PIPERACILLIN AND TAZOBACTAM 3.38 G: 3; .375 INJECTION, POWDER, FOR SOLUTION INTRAVENOUS at 20:43

## 2024-07-20 RX ADMIN — SERTRALINE HYDROCHLORIDE 100 MG: 100 TABLET ORAL at 07:47

## 2024-07-20 ASSESSMENT — ACTIVITIES OF DAILY LIVING (ADL)
ADLS_ACUITY_SCORE: 40
PREVIOUS_RESPONSIBILITIES: MEAL PREP;LAUNDRY;SHOPPING;MEDICATION MANAGEMENT;FINANCES;DRIVING
ADLS_ACUITY_SCORE: 40

## 2024-07-20 NOTE — PROGRESS NOTES
07/20/24 1200   Appointment Info   Signing Clinician's Name / Credentials (OT) Meme Haas, OTR/L   Living Environment   People in Home alone   Current Living Arrangements house  ([facility])   Home Accessibility no concerns   Transportation Anticipated agency   Living Environment Comments single level townhouse, walk in shower, sleeps in recliner   Self-Care   Usual Activity Tolerance fair   Current Activity Tolerance poor   Equipment Currently Used at Home grab bar, toilet;shower chair;walker, rolling;commode chair;grab bar, tub/shower;wheelchair, manual   Fall history within last six months no   Activity/Exercise/Self-Care Comment Prev mod I ADLs and mobility. Ambulates short distances with walker, uses manual wc for longer distances. Has cleaning service, was recently working with [facility] PT   Instrumental Activities of Daily Living (IADL)   Previous Responsibilities meal prep;laundry;shopping;medication management;finances;driving   IADL Comments licensed , but does not usually drive   General Information   Onset of Illness/Injury or Date of Surgery 07/19/24   Referring Physician Ian Ellis,    Patient/Family Therapy Goal Statement (OT) get better and go home   Additional Occupational Profile Info/Pertinent History of Current Problem Bess Enamorado is a 48 year old female with pmhx significant for chronic lymphedema complicated by recurrent BLLE cellulitis who presented to [facility] with septic shock and was admitted to the ICU.   Existing Precautions/Restrictions fall;oxygen therapy device and L/min   Limitations/Impairments other (see comments)  (body habitus)   General Observations and Info on High flow O2 50%, 50 LPM   Cognitive Status Examination   Orientation Status orientation to person, place and time   Visual Perception   Visual Impairment/Limitations WNL   Sensory   Sensory Quick Adds sensation intact   Pain Assessment   Patient Currently in Pain Yes, see Vital Sign  flowsheet   Posture   Posture forward head position   Range of Motion Comprehensive   General Range of Motion no range of motion deficits identified   Strength Comprehensive (MMT)   General Manual Muscle Testing (MMT) Assessment no strength deficits identified   Coordination   Upper Extremity Coordination No deficits were identified   Bed Mobility   Bed Mobility supine-sit   Supine-Sit [place] (Bed Mobility) maximum assist (25% patient effort);1 person to manage equipment   Assistive Device (Bed Mobility) bed rails;draw sheet  (HOB elevated)   Transfers   Transfers sit-stand transfer;bed-chair transfer   Transfer Skill: Bed to Chair/Chair to Bed   Bed-Chair [place] (Transfers) 2 person assist;minimum assist (75% patient effort)   Transfer Comments SPT   Sit-Stand Transfer   Sit-Stand [place] (Transfers) minimum assist (75% patient effort);2 person assist   Balance   Balance Comments High fall risk, limited standing tolerance   Activities of Daily Living   BADL Assessment/Intervention lower body dressing;toileting   Lower Body Dressing Assessment/Training   Position (Lower Body Dressing) sitting up in bed   [place] Level (Lower Body Dressing) maximum assist (25% patient effort)   Toileting   Position (Toileting) supine   Assistive Devices (Toileting) other (see comments)  (purewick)   [place] Level (Toileting) dependent (less than 25% patient effort)   Clinical Impression   Criteria for Skilled Therapeutic Interventions Met (OT) Yes, treatment indicated   OT Diagnosis decreased functional ind   Influenced by the following impairments complex med hx at baseline, SOA with activity   OT Problem List-Impairments impacting ADL problems related to;activity tolerance impaired;balance;strength;pain   Assessment of Occupational Performance 3-5 Performance Deficits   Identified Performance Deficits LE dressing, LE bathing, toileting, transfers, IADLs   Planned Therapy Interventions (OT) ADL  retraining;transfer training;strengthening;progressive activity/exercise   Clinical Decision Making Complexity (OT) detailed assessment/moderate complexity   Risk & Benefits of therapy have been explained evaluation/treatment results reviewed;care plan/treatment goals reviewed;risks/benefits reviewed;current/potential barriers reviewed;participants voiced agreement with care plan;participants included;patient   Clinical Impression Comments Pt will benefit from ongoing OT to maximize safety and ind with ADLs/transfers and improve functional endurance   OT Total Evaluation Time   OT Eval, Moderate Complexity Minutes (09117) 20   OT Goals   Therapy Frequency (OT) 6 times/week   OT Predicted Duration/Target Date for Goal Attainment 07/27/24   OT Goals Lower Body Dressing;Toilet Transfer/Toileting;Transfers;OT Goal 1   OT: Lower Body Dressing Modified independent   OT: Transfer Modified independent;with assistive device  (bed > chair)   OT: Toilet Transfer/Toileting Modified independent;toilet transfer;cleaning and garment management;using adaptive equipment   OT: Goal 1 Patient will complete UE strengthening HEP with SBA in order to improve strength for ADLs/transfers   Interventions   Interventions Quick Adds Self-Care/Home Management   Self-Care/Home Management   Self-Care/Home Mgmt/ADL, Compensatory, Meal Prep Minutes (44460) 15   Symptoms Noted During/After Treatment (Meal Preparation/Planning Training) fatigue;increased pain;shortness of breath   Treatment Detail/Skilled Intervention Pt in bed upon arrival, agreeable to get up into chair for lunch. Pt req max A to don socks in preparation for mobility tasks. She req max Ax2 and lots of extra time to tx from supine > EOB. Once at EOB, Pt completed STS with min A x2, holding heavily onto bedrail for support. Completes SPT with min Ax2, extra time needed. Pt using purewick for urination, declines to trial commode dt fear of not being able to stand from it. Pt req  extra time to position self in chair. Set up with lunch tray, all needs in reach, alarm engaged.   OT Discharge Planning   OT Plan Sat 1/6; functional transfers (commode, bed >chair), toileting, LE dressing, initiate UE HEP   OT Discharge Recommendation (DC Rec) Transitional Care Facility;home with home care occupational therapy   OT Rationale for DC Rec currently recommend TCU dt level of assist needed. May progress to home with HC   OT Brief overview of current status max Ax2 bed mobs, min Ax2 STS/SPT, max A LE dressing, total A toileting   Total Session Time   Timed Code Treatment Minutes 15   Total Session Time (sum of timed and untimed services) 35

## 2024-07-20 NOTE — PLAN OF CARE
Problem: Adult Inpatient Plan of Care  Goal: Optimal Comfort and Wellbeing  Intervention: Provide Person-Centered Care  Recent Flowsheet Documentation  Taken 7/20/2024 1600 by Jareth Cheney RN  Trust Relationship/Rapport:   care explained   choices provided   emotional support provided   empathic listening provided   questions answered   questions encouraged   reassurance provided  Taken 7/20/2024 1200 by Jareth Cheney RN  Trust Relationship/Rapport:   care explained   choices provided   emotional support provided   empathic listening provided   questions answered   questions encouraged   reassurance provided  Taken 7/20/2024 0800 by Jareth Cheney RN  Trust Relationship/Rapport:   care explained   choices provided   emotional support provided   empathic listening provided   questions answered   questions encouraged   reassurance provided     Problem: Sepsis/Septic Shock  Goal: Absence of Infection Signs and Symptoms  Intervention: Promote Stabilization  Recent Flowsheet Documentation  Taken 7/20/2024 1600 by Jareth Cheney RN  Lung Protection Measures:   fluid excess minimized   lung compliance monitored  Taken 7/20/2024 1200 by Jareth Cheney RN  Lung Protection Measures:   fluid excess minimized   lung compliance monitored  Taken 7/20/2024 0800 by Jareth Cheney RN  Lung Protection Measures:   fluid excess minimized   lung compliance monitored     Problem: Comorbidity Management  Goal: Maintenance of Asthma Control  Intervention: Maintain Asthma Symptom Control  Recent Flowsheet Documentation  Taken 7/20/2024 1600 by Jareth Cheney RN  Medication Review/Management: medications reviewed  Taken 7/20/2024 1200 by Jareth Cheney RN  Medication Review/Management: medications reviewed  Taken 7/20/2024 0800 by Jareth Cheney RN  Medication Review/Management: medications reviewed     Problem: Noninvasive Ventilation Acute  Goal: Effective Unassisted Ventilation and  "Oxygenation  7/20/2024 1811 by Jareth Cheney, RN  Outcome: Adequate for Care Transition  7/20/2024 0951 by Jareth Cheney, RN  Outcome: Progressing       End Of Shift Note        Subjective/Objective:    Neuro: patient is alert and oriented, able to make needs known.     Cardiac: SR, Bps WNL. Patient's fingers, face and extremities noted to have purple appearance, patient states that this is her normal. Patient denies and decreased sensation or tingling in her extremities.     Resp: Clear, diminished. O2 titrated throughout the day and is currently on NC at 5L. Will continue to titrate. Patient was complaining of nose pain and discomfort with HFNC. Patient denies SOB or dyspnea. Patient respiratory rate even and unlabored.     GI/: Patient has purewick with adequate UO, incontinent normally at home. Patient has not had a BM, but is noted to be passing flatus. Abdomen is obese but soft    Endo: Q4 blood sugars. Last blood sugar 84, noted not to cross over. When meter noted to be \"low\" patient denied any signs or symptoms of low blood sugar.     MSK: Patient normally sleeps in chair at home and uses commode as needed. Patient transfers back and forth with walker. PT here today and got patient out of bed with one-2 assist. Bed is too high for patient to get back into safely and patient was assisted back to bed with ceiling lift.     Skin: BLE not wrapped today by lymph PT r/t need for antibiotic therapy of 48 hours per PT. BLE very edematous, hard with nodules. No noted changes since yesterday. Patient has mepilex on her coccyx that is intact.     LDAs: PICC pulled per MD order/ID recommendation.      "

## 2024-07-20 NOTE — PROGRESS NOTES
[facility]    Medicine Progress Note - Hospitalist Service    Date of Admission:  7/18/2024    Assessment & Plan    Bess Enamorado is a 48 year old female with pmhx significant for chronic lymphedema complicated by recurrent BLLE cellulitis who presented to [facility] with septic shock and was admitted to the ICU. Patient's shock resolved and she was downgraded to intermediate 07/20, still requiring HFNC.     Septic Shock, shock resolved  Enterococcus Faecalis Bacteremia  Secondary to BLE cellulitis, urinary tract infection (asx but has MS and sensory deficit)  On IV antibiotics, IV fluids  7/19: patient feels improved compared to admission, still on pressors without significant change in requirements  TTE without vegetation (rare for e. Faecalis to cause endocarditis, but also typically doesn't cause septic shock). Biatrial enlargement, LV EF hyperdynamic. Severe pulm htn  7/20: shock resolved last night ~10pm, off pressors    - Continue mIVF  125cc/hr x8hr  - Will continue IV Zosyn for total of 3 days to cover for possible polymicrobial infection  Discussed with Infectious Disease 7/20/24 with following Recommendations:  - Remove PICC today  - Repeat single blood culture okay (due to national blood culture bottle shortage)  - if blood cx negative for 48 hours can place PICC and start 2 weeks of IV abx  - Continue IV Vancomycin   - TTE negative for endocarditis    Acute on chronic respiratory failure with hypoxemia  Patient endorses increased o2 requirements during illness previously  Patient uses 2 liters O2 at home for her CHF  Presented in ED on 10 liters  Currently on Bipap continuous / HFNC  7/20: coming down on O2 requirements, downgrade to intermediate care today. No rales, still looks dry.     SpO2 goal >90%  Wean as tolerated    Hyperbilirubinemia, unconjugated  Hx of lap-tamy  Likely gilberts in setting of sepsis  T bili ~3.3, D bili 0.65     No current indication  "for RUQ US but will monitor intermittently      Diastolic CHF  Currently patient is on IV fluids  Holding home diuretics     Continue holding home diuretic 7/20 while giving fluids, may be able to resume 07/21.     BLLE cellulitis, skin folds wounds    On IV antibiotics  wound care consult  Nystatin cream for skin folds          Diet: Combination Diet 2 gm NA Diet    DVT Prophylaxis: Enoxaparin (Lovenox) SQ  Brar Catheter: Not present  Lines: PRESENT      PICC 07/19/24 Double Lumen Left Basilic Pressors, vesicant infusion and labs-Site Assessment: WDL      Cardiac Monitoring: ACTIVE order. Indication: ICU  Code Status: Full Code      Clinically Significant Risk Factors           # Hypercalcemia: Highest Ca = 10.2 mg/dL in last 2 days, will monitor as appropriate    # Hypoalbuminemia: Lowest albumin = 3 g/dL at 7/20/2024  6:10 AM, will monitor as appropriate   # Thrombocytopenia: Lowest platelets = 123 in last 2 days, will monitor for bleeding   # Hypertension: Noted on problem list    # Chronic heart failure with preserved ejection fraction: heart failure noted on problem list and last echo with EF >50%            #Precipitous drop in Hgb/Hct: Lowest Hgb this hospitalization: 12.9 g/dL. Will continue to monitor and treat/transfuse as appropriate.     # Severe Obesity: Estimated body mass index is 55.44 kg/m  as calculated from the following:    Height as of this encounter: 1.626 m (5' 4\").    Weight as of this encounter: 146.5 kg (322 lb 15.6 oz)., PRESENT ON ADMISSION       # Financial/Environmental Concerns:           Disposition Plan     Medically Ready for Discharge: Anticipated in 2-4 Days             Alex Porter DO  Hospitalist Service  [facility]  Securely message with [employer] (more info)  Text page via [facility] Paging/Directory   ______________________________________________________________________    Interval History   NAEO. Feeling improved. Coming down slightly on HFNC " requirements.    Physical Exam   Vital Signs: Temp: 98.8  F (37.1  C) Temp src: Oral BP: 103/60 Pulse: 66   Resp: 20 SpO2: 98 % O2 Device: High Flow Nasal Cannula (HFNC) Oxygen Delivery: 50 LPM  Weight: 322 lbs 15.58 oz    Physical Exam  Constitutional:       General: Pt is not in acute distress.  HENT:      Head: Normocephalic and atraumatic.      Nose: Nose normal.   Eyes:      Conjunctiva/sclera: Conjunctivae normal.   Pulmonary:      Effort: Pulmonary effort is labored but no acute distress. On HFNC   Abdominal:      General: Abdomen is distended but non-tender.  Skin:     Findings: + Rash.   Neurological:      General: No focal deficit present.      Mental Status: Pt is alert.   Psychiatric:         Mood and Affect: Mood normal.       Medical Decision Making       60 MINUTES SPENT BY ME on the date of service doing chart review, history, exam, documentation & further activities per the note.      Data     I have personally reviewed the following data over the past 24 hrs:    7.2  \   12.9   / 123 (L)     134 (L) 102 14.0 /  87   3.8 24 0.73 \     ALT: <5 AST: 21 AP: 61 TBILI: 3.3 (H)   ALB: 3.0 (L); 3.0 (L) TOT PROTEIN: 7.0 LIPASE: N/A     Procal: N/A CRP: N/A Lactic Acid: 1.1         Imaging results reviewed over the past 24 hrs:   Recent Results (from the past 24 hour(s))   Echocardiogram Complete   Result Value    LVEF  >70%    Narrative    [id number]  [id number]  [id number]  [id number]^DELL^YOLI     [facility]  Echocardiography Laboratory  [address] [place] [address]     Name: CITLALY MICHEL  MRN: [id number]  : 1975  Study Date: 2024 02:43 PM  Age: 48 yrs  Gender: Female  Patient Location: [facility]  Reason For Study: Endocarditis  Ordering Physician: YOLI SHARPE  Referring Physician: Mikala Luna  Performed By: Chelsey Robertson     BSA: 2.4 m2  Height: 64 in  Weight: 322 lb  HR: 73  BP: 120/77  mmHg  ______________________________________________________________________________  Procedure  Complete Portable Echo Adult. Optison (NDC #[id number]) given intravenously.  ______________________________________________________________________________  Interpretation Summary     Hyperdynamic left ventricular function.The visual ejection fraction is >70%.  Mildly decreased right ventricular systolic function.The right ventricle is  moderate to severely dilated.  Moderate bi-atrial enlargement.  There is severe mitral annular calcification.There is mild mitral  stenosis.There is mild to moderate (1-2+) tricuspid regurgitation.  Severe pulmonary hypertension- RVSP 82 mm hg +RA.  IVC diameter >2.1 cm collapsing <50% with sniff suggests a high RA pressure  estimated at 15 mmHg or greater.     No obvious vegetations noted within the limits of a transthoracic  echocardiogram.  Can consider INNA if high clinical suspicion of endocarditis.  ______________________________________________________________________________  Left Ventricle  The left ventricle is normal in size. There is normal left ventricular wall  thickness. Diastolic function not assessed due to significant mitral annular  calcification. Hyperdynamic left ventricular function. The visual ejection  fraction is >70%. No regional wall motion abnormalities noted.     Right Ventricle  The right ventricle is moderate to severely dilated. Mildly decreased right  ventricular systolic function.     Atria  The left atrium is moderately dilated. The right atrium is moderately dilated.     Mitral Valve  There is severe mitral annular calcification. There is trace mitral  regurgitation. There is mild mitral stenosis. The mean mitral valve gradient  is 3.4 mmHg.     Tricuspid Valve  There is mild to moderate (1-2+) tricuspid regurgitation. The right  ventricular systolic pressure is approximated at 82.4 mmHg plus the right  atrial pressure. Pulmonary hypertension.     Aortic  Valve  Aortic valve sclerosis. No aortic regurgitation is present. No aortic stenosis  is present.     Pulmonic Valve  There is trace pulmonic valvular regurgitation. There is no pulmonic valvular  stenosis.     Vessels  Normal size aorta. Ascending aorta dilatation is present. 3.9 cm. Dilation of  the inferior vena cava is present with abnormal respiratory variation in  diameter. IVC diameter >2.1 cm collapsing <50% with sniff suggests a high RA  pressure estimated at 15 mmHg or greater.     Pericardium  There is no pericardial effusion.     Rhythm  Sinus rhythm was noted.  ______________________________________________________________________________  MMode/2D Measurements & Calculations  IVSd: 1.2 cm     LVIDd: 4.4 cm  LVIDs: 2.7 cm  LVPWd: 1.4 cm  FS: 39.7 %  LV mass(C)d: 212.9 grams  LV mass(C)dI: 88.9 grams/m2  Ao root diam: 3.6 cm  LA dimension: 5.6 cm  asc Aorta Diam: 3.9 cm  LA/Ao: 1.6  LVOT diam: 1.9 cm  LVOT area: 2.7 cm2  Ao root diam index Ht(cm/m): 2.2  Ao root diam index BSA (cm/m2): 1.5  Asc Ao diam index BSA (cm/m2): 1.6  Asc Ao diam index Ht(cm/m): 2.4  EF Biplane: 78.5 %  LA Volume (BP): 113.0 ml     LA Volume Index (BP): 47.3 ml/m2  LA Volume Indexed (AL/bp): 48.0 ml/m2  RV Base: 4.9 cm  RWT: 0.64  TAPSE: 1.4 cm     Doppler Measurements & Calculations  MV E max jim: 102.0 cm/sec  MV A max jim: 115.3 cm/sec  MV E/A: 0.88  MV max P.0 mmHg  MV mean PG: 3.4 mmHg  MV V2 VTI: 38.1 cm  MV P1/2t max jim: 110.1 cm/sec  MV P1/2t: 83.2 msec  MVA(P1/2t): 2.6 cm2  MV dec slope: 387.6 cm/sec2  MV dec time: 0.25 sec  PA acc time: 0.07 sec  TR max jim: 454.0 cm/sec  TR max P.4 mmHg  E/E' av.6  Lateral E/e': 16.6  Medial E/e': 16.5  RV S Jim: 15.9 cm/sec     ______________________________________________________________________________  Report approved by: Lian Moore 2024 04:04 PM

## 2024-07-20 NOTE — PROGRESS NOTES
NST reported low blood sugar in which was noted lower 25 minutes after 2 orange juices and applesauce. Patient has noted purple fingers which is her normal and appears to look like Raynaud's, which would change the accuracy of the monitor. Blood sugar rechecked with different meter and farther up on finger.

## 2024-07-20 NOTE — PLAN OF CARE
Pt alert and oriented x4. Pleasant and cooperative with staff. Turn repo/weight shifting throughout night. Wore BiPap throughout night. PICC patent with good blood return. Denies pain. Cardiac monitoring showing SR. BG checks WNL. Levophed stopped at 2230 with MAP's >65 throughout night. No BM. Voiding adequately. Makes needs known.

## 2024-07-21 ENCOUNTER — APPOINTMENT (OUTPATIENT)
Dept: PHYSICAL THERAPY | Facility: CLINIC | Age: 49
DRG: 871 | End: 2024-07-21
Payer: MEDICARE

## 2024-07-21 LAB
ALBUMIN SERPL BCG-MCNC: 3 G/DL (ref 3.5–5.2)
ALP SERPL-CCNC: 63 U/L (ref 40–150)
ALT SERPL W P-5'-P-CCNC: 7 U/L (ref 0–50)
ANION GAP SERPL CALCULATED.3IONS-SCNC: 8 MMOL/L (ref 7–15)
AST SERPL W P-5'-P-CCNC: 19 U/L (ref 0–45)
BACTERIA BLD CULT: ABNORMAL
BASOPHILS # BLD AUTO: 0.1 10E3/UL (ref 0–0.2)
BASOPHILS NFR BLD AUTO: 1 %
BILIRUB DIRECT SERPL-MCNC: 0.63 MG/DL (ref 0–0.3)
BILIRUB SERPL-MCNC: 2.3 MG/DL
BUN SERPL-MCNC: 13.2 MG/DL (ref 6–20)
CALCIUM SERPL-MCNC: 9.5 MG/DL (ref 8.8–10.4)
CHLORIDE SERPL-SCNC: 104 MMOL/L (ref 98–107)
CREAT SERPL-MCNC: 0.7 MG/DL (ref 0.51–0.95)
EGFRCR SERPLBLD CKD-EPI 2021: >90 ML/MIN/1.73M2
EOSINOPHIL # BLD AUTO: 0.2 10E3/UL (ref 0–0.7)
EOSINOPHIL NFR BLD AUTO: 4 %
ERYTHROCYTE [DISTWIDTH] IN BLOOD BY AUTOMATED COUNT: 18.2 % (ref 10–15)
GLUCOSE SERPL-MCNC: 88 MG/DL (ref 70–99)
HCO3 SERPL-SCNC: 22 MMOL/L (ref 22–29)
HCT VFR BLD AUTO: 40.6 % (ref 35–47)
HGB BLD-MCNC: 12.9 G/DL (ref 11.7–15.7)
IMM GRANULOCYTES # BLD: 0 10E3/UL
IMM GRANULOCYTES NFR BLD: 0 %
LYMPHOCYTES # BLD AUTO: 0.9 10E3/UL (ref 0.8–5.3)
LYMPHOCYTES NFR BLD AUTO: 21 %
MCH RBC QN AUTO: 28.7 PG (ref 26.5–33)
MCHC RBC AUTO-ENTMCNC: 31.8 G/DL (ref 31.5–36.5)
MCV RBC AUTO: 90 FL (ref 78–100)
MONOCYTES # BLD AUTO: 0.4 10E3/UL (ref 0–1.3)
MONOCYTES NFR BLD AUTO: 10 %
NEUTROPHILS # BLD AUTO: 2.9 10E3/UL (ref 1.6–8.3)
NEUTROPHILS NFR BLD AUTO: 65 %
NRBC # BLD AUTO: 0 10E3/UL
NRBC BLD AUTO-RTO: 0 /100
PHOSPHATE SERPL-MCNC: 2.3 MG/DL (ref 2.5–4.5)
PLATELET # BLD AUTO: 130 10E3/UL (ref 150–450)
POTASSIUM SERPL-SCNC: 4 MMOL/L (ref 3.4–5.3)
PROT SERPL-MCNC: 7 G/DL (ref 6.4–8.3)
RBC # BLD AUTO: 4.49 10E6/UL (ref 3.8–5.2)
SODIUM SERPL-SCNC: 134 MMOL/L (ref 135–145)
WBC # BLD AUTO: 4.5 10E3/UL (ref 4–11)

## 2024-07-21 PROCEDURE — 250N000013 HC RX MED GY IP 250 OP 250 PS 637: Performed by: INTERNAL MEDICINE

## 2024-07-21 PROCEDURE — 36415 COLL VENOUS BLD VENIPUNCTURE: CPT | Performed by: STUDENT IN AN ORGANIZED HEALTH CARE EDUCATION/TRAINING PROGRAM

## 2024-07-21 PROCEDURE — 250N000011 HC RX IP 250 OP 636: Performed by: INTERNAL MEDICINE

## 2024-07-21 PROCEDURE — 250N000013 HC RX MED GY IP 250 OP 250 PS 637: Performed by: STUDENT IN AN ORGANIZED HEALTH CARE EDUCATION/TRAINING PROGRAM

## 2024-07-21 PROCEDURE — 82248 BILIRUBIN DIRECT: CPT | Performed by: STUDENT IN AN ORGANIZED HEALTH CARE EDUCATION/TRAINING PROGRAM

## 2024-07-21 PROCEDURE — 84100 ASSAY OF PHOSPHORUS: CPT | Performed by: STUDENT IN AN ORGANIZED HEALTH CARE EDUCATION/TRAINING PROGRAM

## 2024-07-21 PROCEDURE — 97530 THERAPEUTIC ACTIVITIES: CPT | Mod: GP | Performed by: PHYSICAL THERAPIST

## 2024-07-21 PROCEDURE — 84460 ALANINE AMINO (ALT) (SGPT): CPT | Performed by: STUDENT IN AN ORGANIZED HEALTH CARE EDUCATION/TRAINING PROGRAM

## 2024-07-21 PROCEDURE — 258N000003 HC RX IP 258 OP 636: Performed by: INTERNAL MEDICINE

## 2024-07-21 PROCEDURE — 99233 SBSQ HOSP IP/OBS HIGH 50: CPT | Performed by: STUDENT IN AN ORGANIZED HEALTH CARE EDUCATION/TRAINING PROGRAM

## 2024-07-21 PROCEDURE — 85025 COMPLETE CBC W/AUTO DIFF WBC: CPT | Performed by: STUDENT IN AN ORGANIZED HEALTH CARE EDUCATION/TRAINING PROGRAM

## 2024-07-21 PROCEDURE — 250N000011 HC RX IP 250 OP 636: Performed by: STUDENT IN AN ORGANIZED HEALTH CARE EDUCATION/TRAINING PROGRAM

## 2024-07-21 PROCEDURE — 120N000004 HC R&B MS OVERFLOW

## 2024-07-21 RX ORDER — SPIRONOLACTONE 25 MG
12.5 TABLET ORAL DAILY
Status: DISCONTINUED | OUTPATIENT
Start: 2024-07-22 | End: 2024-07-23 | Stop reason: HOSPADM

## 2024-07-21 RX ADMIN — EMPAGLIFLOZIN 10 MG: 10 TABLET, FILM COATED ORAL at 11:20

## 2024-07-21 RX ADMIN — VANCOMYCIN HYDROCHLORIDE 1250 MG: 5 INJECTION, POWDER, LYOPHILIZED, FOR SOLUTION INTRAVENOUS at 09:31

## 2024-07-21 RX ADMIN — ENOXAPARIN SODIUM 40 MG: 40 INJECTION SUBCUTANEOUS at 00:32

## 2024-07-21 RX ADMIN — PIPERACILLIN AND TAZOBACTAM 3.38 G: 3; .375 INJECTION, POWDER, FOR SOLUTION INTRAVENOUS at 16:02

## 2024-07-21 RX ADMIN — ENOXAPARIN SODIUM 40 MG: 40 INJECTION SUBCUTANEOUS at 13:43

## 2024-07-21 RX ADMIN — PIPERACILLIN AND TAZOBACTAM 3.38 G: 3; .375 INJECTION, POWDER, FOR SOLUTION INTRAVENOUS at 03:04

## 2024-07-21 RX ADMIN — PIPERACILLIN AND TAZOBACTAM 3.38 G: 3; .375 INJECTION, POWDER, FOR SOLUTION INTRAVENOUS at 20:48

## 2024-07-21 RX ADMIN — SERTRALINE HYDROCHLORIDE 100 MG: 100 TABLET ORAL at 08:17

## 2024-07-21 RX ADMIN — VANCOMYCIN HYDROCHLORIDE 1250 MG: 5 INJECTION, POWDER, LYOPHILIZED, FOR SOLUTION INTRAVENOUS at 22:01

## 2024-07-21 RX ADMIN — PIPERACILLIN AND TAZOBACTAM 3.38 G: 3; .375 INJECTION, POWDER, FOR SOLUTION INTRAVENOUS at 08:44

## 2024-07-21 RX ADMIN — ASPIRIN 81 MG: 81 TABLET, COATED ORAL at 08:17

## 2024-07-21 RX ADMIN — NYSTATIN: 100000 CREAM TOPICAL at 20:52

## 2024-07-21 RX ADMIN — NYSTATIN: 100000 CREAM TOPICAL at 08:18

## 2024-07-21 ASSESSMENT — ACTIVITIES OF DAILY LIVING (ADL)
ADLS_ACUITY_SCORE: 40
ADLS_ACUITY_SCORE: 39
ADLS_ACUITY_SCORE: 39
ADLS_ACUITY_SCORE: 40
DEPENDENT_IADLS:: CLEANING;TRANSPORTATION
ADLS_ACUITY_SCORE: 40

## 2024-07-21 NOTE — PHARMACY-VANCOMYCIN DOSING SERVICE
"Pharmacy Vancomycin Note  Date of Service 2024  Patient's  1975   48 year old, female    Indication: Skin and Soft Tissue Infection  Day of Therapy: 3  Current vancomycin regimen:  1250 mg IV q12h  Current vancomycin monitoring method: AUC  Current vancomycin therapeutic monitoring goal: 400-600 mg*h/L    InsightRX Prediction of Current Vancomycin Regimen  Regimen: 1250 mg IV every 12 hours.  Start time: 20:52 on 2024  Exposure target: AUC24 (range)400-600 mg/L.hr   AUC24,ss: 514 mg/L.hr  Probability of AUC24 > 400: 68 %  Ctrough,ss: 13.9 mg/L  Probability of Ctrough,ss > 20: 33 %  Probability of nephrotoxicity (Lodise ARMANDO ): 9 %    Current estimated CrCl = Estimated Creatinine Clearance: 134.5 mL/min (based on SCr of 0.73 mg/dL).    Creatinine for last 3 days  2024:  8:24 PM Creatinine 0.73 mg/dL  2024:  3:29 AM Creatinine 0.74 mg/dL  2024:  6:10 AM Creatinine 0.73 mg/dL    Recent Vancomycin Levels (past 3 days)  2024:  8:04 PM Vancomycin 13.0 ug/mL    Vancomycin IV Administrations (past 72 hours)                     vancomycin (VANCOCIN) 1,250 mg in sodium chloride 0.9 % 250 mL intermittent infusion (mg) 1,250 mg New Bag 24 0937     1,250 mg New Bag 24 2230     1,250 mg New Bag  0941    vancomycin (VANCOCIN) 1,250 mg in sodium chloride 0.9 % 250 mL intermittent infusion (mg) 1,250 mg New Bag 24 2350                    Nephrotoxins and other renal medications (From now, onward)      Start     Dose/Rate Route Frequency Ordered Stop    24 1000  vancomycin (VANCOCIN) 1,250 mg in sodium chloride 0.9 % 250 mL intermittent infusion         1,250 mg  over 90 Minutes Intravenous EVERY 12 HOURS 24 0229      24 0900  piperacillin-tazobactam (ZOSYN) 3.375 g vial to attach to  mL bag        Note to Pharmacy: For [facility], [facility] and [facility]: For Zosyn-naive patients, use the \"Zosyn initial dose + extended infusion\" order panel.    3.375 g  over 30 " Minutes Intravenous EVERY 6 HOURS 07/19/24 0229 07/22/24 0859    07/19/24 0230  norepinephrine (LEVOPHED) 4 mg in  mL infusion PREMIX         0.01-0.6 mcg/kg/min × 146.5 kg  5.5-329.6 mL/hr  Intravenous CONTINUOUS 07/19/24 0207                 Contrast Orders - past 72 hours (72h ago, onward)      Start     Dose/Rate Route Frequency Stop    07/19/24 1600  perflutren diluted 1mL to 2mL with saline (OPTISON) diluted injection 2 mL         2 mL Intravenous ONCE 07/19/24 1531    07/18/24 2115  iopamidol (ISOVUE-370) solution 146 mL         146 mL Intravenous ONCE 07/18/24 2253            Interpretation of levels and current regimen:  Vancomycin level is reflective of therapeutic level    Has serum creatinine changed greater than 50% in last 72 hours: No    Urine output:  unable to determine    Renal Function: Stable    InsightRX Prediction of Planned New Vancomycin Regimen  Loading dose: N/A  Regimen: 1250 mg IV every 12 hours.  Start time: 21:37 on 07/20/2024  Exposure target: AUC24 (range)400-600 mg/L.hr   AUC24,ss: 457 mg/L.hr  Probability of AUC24 > 400: 78 %  Ctrough,ss: 11.2 mg/L  Probability of Ctrough,ss > 20: 0 %  Probability of nephrotoxicity (Lodise ARMANDO 2009): 7 %      Plan:  Continue Current Dose  Vancomycin monitoring method: AUC  Vancomycin therapeutic monitoring goal: 400-600 mg*h/L  Pharmacy will check vancomycin levels as appropriate in 1-3 Days.  Serum creatinine levels will be ordered daily for the first week of therapy and at least twice weekly for subsequent weeks.    Nusrat Platt [facility]

## 2024-07-21 NOTE — PROGRESS NOTES
[facility]    Medicine Progress Note - Hospitalist Service    Date of Admission:  7/18/2024    Assessment & Plan   Bess Enamorado is a 48 year old female with pmhx significant for chronic lymphedema complicated by recurrent BLLE cellulitis who presented to [facility] with septic shock and was admitted to the ICU. Patient's shock resolved and she was downgraded to intermediate 07/20 and off HFNC 7/21. Awaiting 48hours of negative cultures before PICC placement and OPAT with IV Vancomycin for 2 weeks. Expect 2 more days.    Septic Shock, shock resolved  Enterococcus Faecalis Bacteremia  Secondary to BLE cellulitis, urinary tract infection (asx but has MS and sensory deficit)  On IV antibiotics, IV fluids  TTE without vegetation (rare for e. Faecalis to cause endocarditis, but also typically doesn't cause septic shock). Biatrial enlargement, LV EF hyperdynamic. Severe pulm htn  7/20: shock resolved last night ~10pm, off pressors; Removed PICC  Blood Culture x2 (7/18) 2/2 + E. Faecalis)  Blood Cx (7/20 6am): Prelim NG 1d  7/21: continue current plan, if cultures still negative 07/22 can have picc placed for IV abx for 2 weeks duration starting 7/22    - Will continue IV Zosyn for total of 3 days to cover for possible polymicrobial infection  Discussed with Infectious Disease 7/20/24 with following Recommendations:  - Repeat single blood culture okay (due to national blood culture bottle shortage)  - if blood cx negative for 48 hours can place PICC and start 2 weeks of IV abx  - Continue IV Vancomycin   - TTE negative for endocarditis    Acute on chronic respiratory failure with hypoxemia  Patient endorses increased o2 requirements during illness previously, likely related to MS  Patient uses 2 liters O2 at home for her CHF  Presented in ED on 10 liters  Currently on Bipap continuous / HFNC  7/21: off HFNC onto 3-5L NC     SpO2 goal >90%  Wean as tolerated    Hyperbilirubinemia,  "unconjugated  Hx of lap-tamy  Likely nakul in setting of sepsis  T bili ~3.3, D bili 0.65     No current indication for RUQ US but will monitor intermittently      Diastolic CHF  Severe pHTN (RVSP 82mmHg)  Severe Mitral Annular Calcification  TTE 7/19/24: LV EF >70% hyperdynamic, Severe RV dilation, Moderate Bi-atrial Enlargement, Severe MAC,   Currently patient is on IV fluids  Home meds; Toprol XL 25mg daily, Aldactone 12.5mg Daily, Jardiance 10mg Daily    Toprol XL restarted 07/21 (1/2 dose) 12.5mg Daily  Jardiance restarted 07/21  Aldactone to start 07/22  Continue to hold bumex while restarting anti-hypertensive agents with diuretic effects (unclear how much diurteic patient will truly need, severe MAC + severe pHTN + chronic lymphedema may be difficult to diurese)     BLLE cellulitis, skin folds wounds    On IV antibiotics  wound care consult  Nystatin cream for skin folds          Diet: Combination Diet 2 gm NA Diet    DVT Prophylaxis: Enoxaparin (Lovenox) SQ  Brar Catheter: Not present  Lines: None     Cardiac Monitoring: ACTIVE order. Indication: ICU  Code Status: Full Code      Clinically Significant Risk Factors           # Hypercalcemia: corrected calcium is >10.1, will monitor as appropriate    # Hypoalbuminemia: Lowest albumin = 3 g/dL at 7/21/2024  5:19 AM, will monitor as appropriate   # Thrombocytopenia: Lowest platelets = 123 in last 2 days, will monitor for bleeding   # Hypertension: Noted on problem list    # Chronic heart failure with preserved ejection fraction: heart failure noted on problem list and last echo with EF >50%          #Precipitous drop in Hgb/Hct: Lowest Hgb this hospitalization: 12.9 g/dL. Will continue to monitor and treat/transfuse as appropriate.     # Severe Obesity: Estimated body mass index is 55.44 kg/m  as calculated from the following:    Height as of this encounter: 1.626 m (5' 4\").    Weight as of this encounter: 146.5 kg (322 lb 15.6 oz)., PRESENT ON ADMISSION   "     # Financial/Environmental Concerns: none         Disposition Plan     Medically Ready for Discharge: Anticipated in 2-4 Days             Alex Porter DO  Hospitalist Service  [facility]  Securely message with [facility] (more info)  Text page via [employer] - is now [employer] Paging/Directory   ______________________________________________________________________    Interval History   NAEO. Improving. 1-2 d assuming blood cx negative tomorrow.    Physical Exam   Vital Signs: Temp: 97.3  F (36.3  C) Temp src: Axillary BP: 93/58 Pulse: 74   Resp: 17 SpO2: 95 % O2 Device: Nasal cannula Oxygen Delivery: 2 LPM  Weight: 322 lbs 15.58 oz    Physical Exam  Constitutional:       Appearance: She is not ill-appearing.   HENT:      Mouth/Throat:      Mouth: Mucous membranes are moist.   Cardiovascular:      Rate and Rhythm: Normal rate.      Pulses: Normal pulses.      Heart sounds: Normal heart sounds.   Pulmonary:      Breath sounds: Normal breath sounds.   Abdominal:      Tenderness: There is no abdominal tenderness.   Musculoskeletal:      Right lower leg: Edema present.      Left lower leg: Edema present.   Skin:     General: Skin is warm.      Capillary Refill: Capillary refill takes less than 2 seconds.      Comments: Lymphedema with papillomatosis   Neurological:      General: No focal deficit present.      Mental Status: She is alert.           Medical Decision Making       55 MINUTES SPENT BY ME on the date of service doing chart review, history, exam, documentation & further activities per the note.      Data     I have personally reviewed the following data over the past 24 hrs:    4.5  \   12.9   / 130 (L)     134 (L) 104 13.2 /  88   4.0 22 0.70 \     ALT: 7 AST: 19 AP: 63 TBILI: 2.3 (H)   ALB: 3.0 (L) TOT PROTEIN: 7.0 LIPASE: N/A       Imaging results reviewed over the past 24 hrs:   No results found for this or any previous visit (from the past 24 hour(s)).

## 2024-07-21 NOTE — PLAN OF CARE
End Of Shift Note      Subjective/Objective:    Neuro: alert and oriented. Patient pleasant and cooperative    Cardiac: SR- Tele discontinued. BP soft this am. Metoprolol held- MD aware    Resp: Clear, O2 at 2LNC home dose Spo2 94-96 all day. Continuous monitoring stopped. Patient transferred to Medical status. Own Cpap at night in room    GI/: Small smear this am. Purewick intact.     MSK: patient to chair with 1 assist and was in chair a few hours. Patient transferred to commode and back to chair without results. Commode was too tall per patient Patient assisted back to bed with lb r/t bed being too high.     Skin: PT saw for lymphedema-patient refused wraps at this time. Legs weepy in posterior bend. Patient states that this happens frequently. Intradry placed. No new skin issues noted    LDAs: PIV x2

## 2024-07-21 NOTE — PLAN OF CARE
Pt alert and oriented x4. Pleasant and cooperative with staff. Purewic changed and in place collecting zarina urine. x1BM overnight. Denies pain. Wore home BiPap throughout night with effective oxygenation. Otherwise on 5L NC. Monitor showing SR. X2 PIV patent. Makes needs known.

## 2024-07-21 NOTE — PLAN OF CARE
Pt is at baseline for transfers and she feels able to manage at home, her edema she reports is also at her baseline, she declined having her garments donned at this time. Recommend pt cont to get up for transfers bed<>chair with staff and supervision, doesn't sleep in a bed at home so may need help in and out of bed but today was S to mod I for transfer to chair, min A for L LE only to get out of bed.  Physical Therapy Discharge Summary    Reason for therapy discharge:    All goals and outcomes met, no further needs identified.    Progress towards therapy goal(s). See goals on Care Plan in [facility] electronic health record for goal details.  Goals met    Therapy recommendation(s):    Continued therapy is recommended.  Rationale/Recommendations:  home care as previous as she was working with home PT on car transfers and that is not something we would work on here in the hospital.

## 2024-07-21 NOTE — CONSULTS
Care Management Initial Consult    General Information  Assessment completed with: Patient, VM-chart review,    Type of CM/SW Visit: Initial Assessment    Primary Care Provider verified and updated as needed: Yes   Readmission within the last 30 days: no previous admission in last 30 days      Reason for Consult: discharge planning  Advance Care Planning: Advance Care Planning Reviewed: no concerns identified          Communication Assessment  Patient's communication style: spoken language (English or Bilingual)    Hearing Difficulty or Deaf: no   Wear Glasses or Blind: yes    Cognitive  Cognitive/Neuro/Behavioral: WDL                      Living Environment:   People in home: alone     Current living Arrangements: house (Single level [facility])      Able to return to prior arrangements: yes       Family/Social Support:  Care provided by: self, homecare agency  Provides care for: no one  Marital Status: Single  Sibling(s)          Description of Support System:      Support Assessment: Adequate family and caregiver support, Adequate social supports    Current Resources:   Patient receiving home care services: Yes  Skilled Home Care Services: Skilled Nursing, Home Health Aid, Physical Therapy ([facility])    Community Resources: Home Care    Equipment currently used at home: grab bar, toilet, shower chair, walker, rolling, commode chair, grab bar, tub/shower, wheelchair, manual    Supplies currently used at home: Incontinence Supplies, Oxygen (2L at baseline Tubing/Supplies, Other (CPAP)    Employment/Financial:  Employment Status: disabled        Financial Concerns: none           Does the patient's insurance plan have a 3 day qualifying hospital stay waiver?  No    Lifestyle & Psychosocial Needs:  Social Determinants of Health     Food Insecurity: Low Risk  (3/21/2024)    Food Insecurity     Within the past 12 months, did you worry that your food would run out before you got money to buy more?: No      Within the past 12 months, did the food you bought just not last and you didn t have money to get more?: No   Depression: Not at risk (2/7/2024)    PHQ-2     PHQ-2 Score: 2   Housing Stability: Low Risk  (3/21/2024)    Housing Stability     Do you have housing? : Yes     Are you worried about losing your housing?: No   Tobacco Use: Low Risk  (7/5/2024)    Patient History     Smoking Tobacco Use: Never     Smokeless Tobacco Use: Never     Passive Exposure: Not on file   Financial Resource Strain: Low Risk  (9/25/2023)    Financial Resource Strain     Within the past 12 months, have you or your family members you live with been unable to get utilities (heat, electricity) when it was really needed?: No   Alcohol Use: Not on file   Transportation Needs: Low Risk  (3/21/2024)    Transportation Needs     Within the past 12 months, has lack of transportation kept you from medical appointments, getting your medicines, non-medical meetings or appointments, work, or from getting things that you need?: No   Physical Activity: Inactive (3/21/2024)    Exercise Vital Sign     Days of Exercise per Week: 0 days     Minutes of Exercise per Session: 0 min   Interpersonal Safety: Low Risk  (9/27/2023)    Interpersonal Safety     Do you feel physically and emotionally safe where you currently live?: Yes     Within the past 12 months, have you been hit, slapped, kicked or otherwise physically hurt by someone?: No     Within the past 12 months, have you been humiliated or emotionally abused in other ways by your partner or ex-partner?: No   Stress: No Stress Concern Present (12/4/2020)    [employer] - Occupational Stress Questionnaire     Feeling of Stress : Only a little   Social Connections: Unknown (3/1/2022)    Social Connection and Isolation Panel [NHANES]     Frequency of Communication with Friends and Family: Twice a week     Frequency of Social Gatherings with Friends and Family: Twice a week      "Attends [employer] Services: Not on file     Active Member of Clubs or Organizations: Not on file     Attends Club or Organization Meetings: Not on file     Marital Status: Not on file   Health Literacy: Not on file       Functional Status:  Prior to admission patient needed assistance:   Dependent ADLs:: Ambulation-walker, Bathing  Dependent IADLs:: Cleaning, Transportation  Assesssment of Functional Status: Not at baseline with mobility    Mental Health Status:  Mental Health Status: No Current Concerns       Chemical Dependency Status:  Chemical Dependency Status: No Current Concerns             Values/Beliefs:  Spiritual, Cultural Beliefs, [employer] Practices, Values that affect care: no               Additional Information:  Care Management has received consult for discharge planning and patient is at elevated risk for hospital readmission.      Writer is working remotely, assisting the [facility] Care Management team.  Writer called and spoke with patient via the hospital room phone.  Writer introduced self and role.      Patient lives alone in a single level town home. Patient uses a 4WW for short distances and has a manual wheelchair. Patient uses 2L oxygen at baseline and CPAP at night.       Discussed the current recommendation for TCU placement for IV antibiotic treatment (and currently OT services is \"recommending TCU due to level of assist needed at this time. Patient may progress to be able to discharge\".     Patient declines TCU placement and plans to return to her single level town home, where she lives alone.  Patient stated that she will continue with her current home care services through [facility] (Phone: [phone]) RN, aide, and PT.  Patient also stated that she has had home infusion in the past and did have some private pay costs associated with the home infusion.      Discussed that writer will send a referral to  Home Infusion services for a benefit check and then " CM can discuss benefits and any costs, once the benefit check has been completed.  Patient stated understanding of this.      Discussed transportation for day of discharge.  Patient is requesting that CM set up [facility], she used them for transport home during her last hospital stay.      Care Management will continue to follow and assist with discharge planning.      KAY Napoles  [facility]   [phone]   *Assisting [facility] Care Management   #[phone]

## 2024-07-21 NOTE — PLAN OF CARE
Goal Outcome Evaluation:      Plan of Care Reviewed With: patient          Outcome Evaluation: Patient plans to return home, continue wtih [facility], Start Home Infusion for IV ABX (pending benefit check)

## 2024-07-22 ENCOUNTER — HOME INFUSION (PRE-WILLOW HOME INFUSION) (OUTPATIENT)
Dept: PHARMACY | Facility: CLINIC | Age: 49
End: 2024-07-22
Payer: MEDICARE

## 2024-07-22 ENCOUNTER — APPOINTMENT (OUTPATIENT)
Dept: GENERAL RADIOLOGY | Facility: CLINIC | Age: 49
DRG: 871 | End: 2024-07-22
Attending: INTERNAL MEDICINE
Payer: MEDICARE

## 2024-07-22 LAB
ALBUMIN SERPL BCG-MCNC: 3 G/DL (ref 3.5–5.2)
ALP SERPL-CCNC: 64 U/L (ref 40–150)
ALT SERPL W P-5'-P-CCNC: 7 U/L (ref 0–50)
ANION GAP SERPL CALCULATED.3IONS-SCNC: 10 MMOL/L (ref 7–15)
AST SERPL W P-5'-P-CCNC: 16 U/L (ref 0–45)
BACTERIA UR CULT: ABNORMAL
BASOPHILS # BLD AUTO: 0.1 10E3/UL (ref 0–0.2)
BASOPHILS NFR BLD AUTO: 1 %
BILIRUB DIRECT SERPL-MCNC: 0.62 MG/DL (ref 0–0.3)
BILIRUB SERPL-MCNC: 2.2 MG/DL
BUN SERPL-MCNC: 11.2 MG/DL (ref 6–20)
CALCIUM SERPL-MCNC: 9.7 MG/DL (ref 8.8–10.4)
CHLORIDE SERPL-SCNC: 104 MMOL/L (ref 98–107)
CREAT SERPL-MCNC: 0.73 MG/DL (ref 0.51–0.95)
EGFRCR SERPLBLD CKD-EPI 2021: >90 ML/MIN/1.73M2
EOSINOPHIL # BLD AUTO: 0.2 10E3/UL (ref 0–0.7)
EOSINOPHIL NFR BLD AUTO: 4 %
ERYTHROCYTE [DISTWIDTH] IN BLOOD BY AUTOMATED COUNT: 18.1 % (ref 10–15)
GLUCOSE BLDC GLUCOMTR-MCNC: 82 MG/DL (ref 70–99)
GLUCOSE BLDC GLUCOMTR-MCNC: 88 MG/DL (ref 70–99)
GLUCOSE SERPL-MCNC: 81 MG/DL (ref 70–99)
HCO3 SERPL-SCNC: 22 MMOL/L (ref 22–29)
HCT VFR BLD AUTO: 41.4 % (ref 35–47)
HGB BLD-MCNC: 13 G/DL (ref 11.7–15.7)
IMM GRANULOCYTES # BLD: 0 10E3/UL
IMM GRANULOCYTES NFR BLD: 0 %
LYMPHOCYTES # BLD AUTO: 1 10E3/UL (ref 0.8–5.3)
LYMPHOCYTES NFR BLD AUTO: 25 %
MCH RBC QN AUTO: 28.2 PG (ref 26.5–33)
MCHC RBC AUTO-ENTMCNC: 31.4 G/DL (ref 31.5–36.5)
MCV RBC AUTO: 90 FL (ref 78–100)
MONOCYTES # BLD AUTO: 0.4 10E3/UL (ref 0–1.3)
MONOCYTES NFR BLD AUTO: 9 %
NEUTROPHILS # BLD AUTO: 2.5 10E3/UL (ref 1.6–8.3)
NEUTROPHILS NFR BLD AUTO: 60 %
NRBC # BLD AUTO: 0 10E3/UL
NRBC BLD AUTO-RTO: 0 /100
PHOSPHATE SERPL-MCNC: 3 MG/DL (ref 2.5–4.5)
PLATELET # BLD AUTO: 146 10E3/UL (ref 150–450)
POTASSIUM SERPL-SCNC: 3.9 MMOL/L (ref 3.4–5.3)
PROT SERPL-MCNC: 7.2 G/DL (ref 6.4–8.3)
RBC # BLD AUTO: 4.61 10E6/UL (ref 3.8–5.2)
SODIUM SERPL-SCNC: 136 MMOL/L (ref 135–145)
WBC # BLD AUTO: 4.1 10E3/UL (ref 4–11)

## 2024-07-22 PROCEDURE — 84155 ASSAY OF PROTEIN SERUM: CPT | Performed by: STUDENT IN AN ORGANIZED HEALTH CARE EDUCATION/TRAINING PROGRAM

## 2024-07-22 PROCEDURE — 36415 COLL VENOUS BLD VENIPUNCTURE: CPT | Performed by: STUDENT IN AN ORGANIZED HEALTH CARE EDUCATION/TRAINING PROGRAM

## 2024-07-22 PROCEDURE — 250N000013 HC RX MED GY IP 250 OP 250 PS 637: Performed by: STUDENT IN AN ORGANIZED HEALTH CARE EDUCATION/TRAINING PROGRAM

## 2024-07-22 PROCEDURE — 84075 ASSAY ALKALINE PHOSPHATASE: CPT | Performed by: STUDENT IN AN ORGANIZED HEALTH CARE EDUCATION/TRAINING PROGRAM

## 2024-07-22 PROCEDURE — 250N000011 HC RX IP 250 OP 636: Performed by: INTERNAL MEDICINE

## 2024-07-22 PROCEDURE — 85025 COMPLETE CBC W/AUTO DIFF WBC: CPT | Performed by: STUDENT IN AN ORGANIZED HEALTH CARE EDUCATION/TRAINING PROGRAM

## 2024-07-22 PROCEDURE — 250N000009 HC RX 250: Performed by: INTERNAL MEDICINE

## 2024-07-22 PROCEDURE — 250N000013 HC RX MED GY IP 250 OP 250 PS 637: Performed by: INTERNAL MEDICINE

## 2024-07-22 PROCEDURE — 82248 BILIRUBIN DIRECT: CPT | Performed by: STUDENT IN AN ORGANIZED HEALTH CARE EDUCATION/TRAINING PROGRAM

## 2024-07-22 PROCEDURE — 272N000579 HC TRAY POWER PICC SOLO 4FR SINGLE LUMEN

## 2024-07-22 PROCEDURE — 36569 INSJ PICC 5 YR+ W/O IMAGING: CPT

## 2024-07-22 PROCEDURE — 258N000003 HC RX IP 258 OP 636: Performed by: INTERNAL MEDICINE

## 2024-07-22 PROCEDURE — 120N000004 HC R&B MS OVERFLOW

## 2024-07-22 PROCEDURE — 99232 SBSQ HOSP IP/OBS MODERATE 35: CPT | Performed by: INTERNAL MEDICINE

## 2024-07-22 PROCEDURE — 250N000011 HC RX IP 250 OP 636: Performed by: STUDENT IN AN ORGANIZED HEALTH CARE EDUCATION/TRAINING PROGRAM

## 2024-07-22 PROCEDURE — 84100 ASSAY OF PHOSPHORUS: CPT | Performed by: STUDENT IN AN ORGANIZED HEALTH CARE EDUCATION/TRAINING PROGRAM

## 2024-07-22 PROCEDURE — 999N000065 XR CHEST PORT 1 VIEW

## 2024-07-22 RX ORDER — AMPICILLIN 2 G/1
2 INJECTION, POWDER, FOR SOLUTION INTRAVENOUS EVERY 4 HOURS
Status: DISCONTINUED | OUTPATIENT
Start: 2024-07-22 | End: 2024-07-23 | Stop reason: HOSPADM

## 2024-07-22 RX ORDER — LIDOCAINE 40 MG/G
CREAM TOPICAL
Status: DISCONTINUED | OUTPATIENT
Start: 2024-07-22 | End: 2024-07-23 | Stop reason: HOSPADM

## 2024-07-22 RX ADMIN — AMPICILLIN SODIUM 2 G: 2 INJECTION, POWDER, FOR SOLUTION INTRAMUSCULAR; INTRAVENOUS at 18:15

## 2024-07-22 RX ADMIN — AMPICILLIN SODIUM 2 G: 2 INJECTION, POWDER, FOR SOLUTION INTRAMUSCULAR; INTRAVENOUS at 21:38

## 2024-07-22 RX ADMIN — AMPICILLIN SODIUM 2 G: 2 INJECTION, POWDER, FOR SOLUTION INTRAMUSCULAR; INTRAVENOUS at 14:22

## 2024-07-22 RX ADMIN — EMPAGLIFLOZIN 10 MG: 10 TABLET, FILM COATED ORAL at 08:32

## 2024-07-22 RX ADMIN — SERTRALINE HYDROCHLORIDE 100 MG: 100 TABLET ORAL at 08:32

## 2024-07-22 RX ADMIN — ASPIRIN 81 MG: 81 TABLET, COATED ORAL at 08:32

## 2024-07-22 RX ADMIN — SPIRONOLACTONE 12.5 MG: 25 TABLET, FILM COATED ORAL at 08:33

## 2024-07-22 RX ADMIN — NYSTATIN: 100000 CREAM TOPICAL at 21:23

## 2024-07-22 RX ADMIN — LIDOCAINE HYDROCHLORIDE 2 ML: 10 INJECTION, SOLUTION EPIDURAL; INFILTRATION; INTRACAUDAL; PERINEURAL at 11:43

## 2024-07-22 RX ADMIN — PIPERACILLIN AND TAZOBACTAM 3.38 G: 3; .375 INJECTION, POWDER, FOR SOLUTION INTRAVENOUS at 02:52

## 2024-07-22 RX ADMIN — METOPROLOL SUCCINATE 12.5 MG: 25 TABLET, EXTENDED RELEASE ORAL at 08:33

## 2024-07-22 RX ADMIN — VANCOMYCIN HYDROCHLORIDE 1250 MG: 5 INJECTION, POWDER, LYOPHILIZED, FOR SOLUTION INTRAVENOUS at 10:18

## 2024-07-22 RX ADMIN — ENOXAPARIN SODIUM 40 MG: 40 INJECTION SUBCUTANEOUS at 14:22

## 2024-07-22 RX ADMIN — NYSTATIN: 100000 CREAM TOPICAL at 08:36

## 2024-07-22 RX ADMIN — ENOXAPARIN SODIUM 40 MG: 40 INJECTION SUBCUTANEOUS at 01:07

## 2024-07-22 ASSESSMENT — ACTIVITIES OF DAILY LIVING (ADL)
ADLS_ACUITY_SCORE: 40

## 2024-07-22 NOTE — PROGRESS NOTES
"Care Management Follow Up    Length of Stay (days): 3    Expected Discharge Date: 07/24/2024     Concerns to be Addressed: discharge planning     Patient plan of care discussed at interdisciplinary rounds: Yes    Anticipated Discharge Disposition: Home, Home Care, Home Infusion     Anticipated Discharge Services: Home Care, Housekeeping/[facility], Transportation Services  Anticipated Discharge DME: None    Patient/family educated on Medicare website which has current facility and service quality ratings: no  Education Provided on the Discharge Plan:    Patient/Family in Agreement with the Plan: yes    Referrals Placed by CM/SW: Internal Clinic Care Coordination, Homecare, Home Infusion, Transportation  Private pay costs discussed: transportation costs and insurance costs out of pocket expenses    Additional Information:  Per MD at IDT rounds pt is not medically stable for discharge today, likely tomorrow. Pt getting PICC placed today and will discharge home with IV antibiotics.     CM sent for benefit check with [facility] at [phone]/Fax: [phone] for pt's IV abx needs upon discharge.  Per benefits \"Pt does not have coverage for iv abx through their Medicare plan. Drug would be billed to the part D and supplies will be self-pay. Based on Vanco 1250mg q12h, total cost is $57.72 per day for drug and supplies. For nursing, patient should have coverage if homebound, however [facility] is not contracted with Medicare and an outside nursing agency would be utilized instead. If patient is not homebound, there is no coverage and [facility] can see patient if patient agrees to self-pay for $90 per visit. Patient should have coverage in a TCU or infusion center.\"    CM met bedside with pt to discuss private pay costs and options, pt is declining to go to a TCU and does not have transportation for outpatient infusion. Pt has elected to privately pay for [facility].  Duc updated [facility] liapo Coleman with this, plan " is for PICC today and [facility] to provide teaching tmr. Pt already has homecare with [facility] (Phone: [phone]) RN, aide, and PT.    SNOW spoke with Ibeth liapo for [facility] to update pt will go home with IV antibiotics, Ibeth to update branch that RN will do nursing portion of home infusion.    Plan: home with resumption of [facility] (Phone: [phone]) RN,HHA, PT as well as [facility] at [phone]/Fax: [phone] for pt's IV abx needs upon discharge.      Transport: [facility] w/c    Aimee Donald RNCM  Care Transitions Registered Nurse  Tele: [phone]

## 2024-07-22 NOTE — PROGRESS NOTES
Antimicrobial Stewardship Team Note    Antimicrobial Stewardship Program - A joint venture between [facility] and  Physicians to optimize antibiotic management.  NOT a formal consult - Restricted Antimicrobial Review     Patient: Bess Enamorado  MRN: [id number]  Allergies: Nkda [no known drug allergy]    Brief Summary: Bess Enamorado is a 48 year old female with PMHx significant for chronic hypoxia (on home O2 2 L), CHF, NSTEMI, QTc prolongation, HTN, Multiple Sclerosis, obesity, sleep apnea and arthritis who presented to the ED 7/18 with respiratory distress, chills and sweats.    History of present illness: Bess presented to the ED the evening of 7/18 with above mentioned symptoms. She was in her usual state of health before presenting. Patient was febrile upon presentation with a temperature of 101.9, BP of 142/107 and a HR of 101. Patient's O2 sat was 83% on 10 L Oxymask with respiratory rate of 22. Labs upon presentation were notable for lactic acid of 4.9 and normal WBC of 9.8. A TTE performed at bedside was unremarkable. Chest X-ray revealed possible pulmonary vascular congestion without patricia consolidation, pleural effusion or pneumothorax. CT of right femur was significant for diffuse soft tissue edema involving the right thigh, more apparent distally as well as a right fatty ovarian dermoid. Left femur-thigh CT was significant for diffuse soft tissue swelling throughout the cutaneous and subcutaneous tissues of the visualized left thigh, greatest medially; possible bland edema or cellulitis and a large right adnexal dermoid cyst, measuring up to 9.3 cm. No abscess or soft tissue gas. CT of chest/abdomen/pelvis with contrast was significant for no pulmonary embolism, diffuse mosaic attenuation throughout pulmonary parenchyma possibly due to airway disease or viral etiology, hepatomegaly with diffuse hepatic steatosis, and unchanged right adnexal dermoid cyst. Influenza and COVID-19 panel both  came back negative. Urine culture taken 7/18 is pending. Blood cultures taken 7/18 returned positive for ampicillin sensitive E. faecalis. Repeat cultures taken 7/20 are no growth to date thus far. Patient was started on clindamycin due to concerns for possible necrotizing fasciitis as well as Zosyn and vancomycin on 7/18. Clindamycin was discontinued on 7/19 when providers were no longer concerned for necrotizing fasciitis. Zosyn was discontinued on 7/22 due to lower suspicions of a polymicrobial infection. Shortly after admission, patient's oxygen needs increased and she was placed on BiPAP with FiO2 of 25% which was subsequently increased to 70%, but is currently on 2L via nasal cannula during the day and BiPAP at night. Patient's WBC peaked at 12.9 on 7/19 but has since normalized.       Active Anti-infective Medications   (From admission, onward)                 Start     Stop    07/19/24 1000  vancomycin (VANCOCIN) injection  1,250 mg,   Intravenous,   EVERY 12 HOURS        Skin and Soft Tissue Infection       --                  Assessment: Sepsis due to E. faecalis bacteremia caused by bilateral thigh cellulitis  Today is day 5 of antibiotic treatment. Patient's condition is improving with WBC of 4.1, down from 12.9 on 7/19. Patient is afebrile and hemodynamically stable. O2 needs are being met using 2-3 L nasal canula and BiPAP, similar to patient's home Oxygen requirements. Patient's most likely sources of bacteremia is due to cellulitis present on both thighs that may have come from urogenital contamination or a urinary tract infection.  The PICC line that was inserted on 7/18 had blood cultures that were isolated E faecalis but due to the short term nature of the PICC line prior to removal, this is likely not the source. Other potential sources of infection such as pulmonary, CNS, abdominal, and prosthetic devices were considered, but in the absence of convincing imaging combined with a lack of symptoms,  all other possible sources of infection are of low suspicion. Infective endocarditis cannot yet be ruled out due to INNA not being performed. The NOVA score can be utilized to assess if patients need a INNA for Enterococcal bacteremia to rule out IE.1 Patient's NOVA score was high enough to warrant INNA being performed, and as such we recommend performing an outpatient INNA to rule out cardiac source before completing antibiotic treatment. If an outpatient INNA cannot be performed, recommend performing surveillance blood cultures 1 week after completing antibiotic regiment to verify culture clearance. Patient has no prior history of MRSA infection, no positive MRSA cultures and lacks other risk factors associated with MRSA infection such as prior hospital stay in the past 90 days, so MRSA coverage is likely not needed. Susceptibilities show ampicillin as being a good choice for treatment, we recommend switching from vancomycin to ampicillin 2 g IV q 4 hours due to better side effect profile and lack of need for MRSA coverage. Patient requires larger dose of ampicillin due to BMI of 55. Recommend total course of treatment for at least 14 days from first negative blood culture assuming clinical improvement. When the patient is ready for discharge, an oral alternative to ampicillin is linezolid 600 mg every 12 hours. There is some question about whether or not a higher dose of linezolid is needed for patients with elevated BMI's, however, the evidence is currently unclear for Enterococcal infections and a higher dose increases the risk of adverse effects so we recommend the normal dosing strategy at this time.      References:   Maria Fernanda REAL, Tracey M, Hermila T, et al. The NOVA score: a proposal to reduce the need for transesophageal echocardiography in patients with enterococcal bacteremia [published correction appears in Clin Infect Dis. 2015 May 21;61(2):297. doi: 10.1093/lavelle/kdq267]. Clin Infect Dis. 2015;60(4):528-535.  doi:10.1093/lavelle/ahb072    Recommendations:  Recommend outpatient INNA due to high NOVA score before conclusion of antibiotic treatment. If outpatient INNA is not feasible, recommend surveillance blood cultures 1 week after antibiotic completion.    Transition from vancomycin to ampicillin 2 g IV q 4 hours.  Total course of treatment should be at least 14 days from last negative blood culture assuming clinical improvement (ending 8/2 at the earliest).  If INNA is obtained and is negative for IE, 14 days is sufficient  If INNA is positive OR surveillance cultures are positive, patient will need a longer duration of therapy at which point admission with ID consult would be recommended  When the patient is ready for discharge, recommend transitioning from ampicillin to linezolid 600 mg by mouth every 12 hours.     Pharmacy took the following actions: Called/paged provider, Electronic note created.    Discussed with ID Staff Sha Pitts MD, and Kg Florez, PharmD     Shai Modi, 2025 PharmD Candidate  Phone #: [phone]    Vital Signs/Clinical Features:  Vitals         07/20 0700  07/21 0659 07/21 0700 07/22 0659 07/22 0700  07/22 1347   Most Recent      Temp ( F) 98.2 -  98.8    97.3 -  98.1      97.7     97.7 (36.5) 07/22 0757    Pulse 66 -  89    73 -  75       75 07/22 0335    Resp 14 -  24    14 - 22      16     16 07/22 0757    BP 93/60 -  112/68    93/58 -  121/78      121/76     121/76 07/22 0757    SpO2 (%) 87 -  98    89 -  98       90 07/22 0335            Labs  Estimated Creatinine Clearance: 134.5 mL/min (based on SCr of 0.73 mg/dL).  Recent Labs   Lab Test 06/20/24  0310 07/18/24 2024 07/19/24  0329 07/20/24  0610 07/21/24  0519 07/22/24  0537   CR 0.71 0.73 0.74 0.73 0.70 0.73       Recent Labs   Lab Test 10/22/18  1921 10/23/18  0225 10/23/18  0500 10/24/18  0503 06/09/19  1936 02/24/20  1233 02/25/20  0514 02/26/20  0535 03/04/21  2127 03/06/21  0611 05/01/24  0637 07/18/24 2024 07/19/24  0329  07/20/24 0610 07/21/24 0519 07/22/24  0537   WBC 34.3*  --  18.2*   < > 7.2 20.9* 13.5*   < > 10.9   < > 4.1 9.8 12.9* 7.2 4.5 4.1   ANEU 31.8*  --  16.6*  --  5.8 19.6* 12.4*  --  9.4*  --   --   --   --   --   --   --    ALYM 0.3*  --  0.3*  --  0.4* 0.3* 0.3*  --  0.7*  --   --   --   --   --   --   --    PIERCE 1.7*  --  1.1  --  0.7 0.8 0.7  --  0.7  --   --   --   --   --   --   --    AEOS 0.0  --  0.0  --  0.2 0.0 0.1  --  0.1  --   --   --   --   --   --   --    HGB 14.2  --  12.8   < > 13.1 14.6 12.3   < > 14.3   < > 14.9 15.9* 15.1 12.9 12.9 13.0   HCT 45.0  --  40.8   < > 42.7 45.7 39.1   < > 47.4*   < > 48.9* 50.6* 46.7 40.4 40.6 41.4   MCV 86  --  88   < > 83 84 84   < > 87   < > 88 89 89 90 90 90      < > 193   < > 254 329 213   < > 284   < > 162 201 155 123* 130* 146*    < > = values in this interval not displayed.       Recent Labs   Lab Test 04/27/24  0736 07/18/24 2024 07/19/24 0329 07/20/24 0610 07/21/24 0519 07/22/24  0537   BILITOTAL 1.4* 2.7* 3.5* 3.3* 2.3* 2.2*   ALKPHOS 63 83 74 61 63 64   ALBUMIN 3.2* 4.0 3.5 3.0*  3.0* 3.0* 3.0*   AST 32 34 20 21 19 16   ALT 13 8 6 <5 7 7       Recent Labs   Lab Test 03/06/21  0611 02/08/22  1115 02/08/22 2012 02/09/22  0457 02/10/22  0534 02/11/22  0502 02/12/22  0454 02/20/22  2303 02/21/22  0626 04/06/22  2359 04/07/22  0935 04/07/22  1157 04/18/24  0510 04/18/24  0736 04/19/24  0532 04/26/24  1319 07/18/24  2024 07/19/24  0329 07/19/24  1119 07/19/24  1605   PCAL  --   --  1.00*  --   --   --   --   --  0.15  --  0.75*  --  0.10  --   --   --  0.06  --   --   --    LACT  --  1.4  --   --   --   --   --    < >  --    < >  --    < >  --    < > 1.4 2.0 4.9* 3.2* 2.6* 1.1   .0* 7.9*  --  21.8* 20.0* 13.1* 8.5*  --   --   --   --   --   --   --   --   --   --   --   --   --    CRPI  --   --   --   --   --   --   --   --   --   --   --   --  6.71*  --   --  16.42*  --   --   --   --     < > = values in this interval not displayed.        Recent Labs   Lab Test 07/20/24 2004   VANCOMYCIN 13.0       Culture Results:  7-Day Micro Results       Procedure Component Value Units Date/Time    Blood Culture Line, venous [[id number]]  (Normal) Collected: 07/20/24 0610    Order Status: Completed Lab Status: Preliminary result Updated: 07/22/24 0631    Specimen: Blood from Line, venous      Culture No growth after 2 days    Blood Culture Peripheral Blood     Order Status: Canceled Lab Status: No result     Specimen: Peripheral Blood     Urine Culture [[id number]] Collected: 07/18/24 2117    Order Status: Completed Lab Status: Preliminary result Updated: 07/21/24 1134    Specimen: Urine, Catheter      Culture Culture in progress    Blood Culture Hand, Right [[id number]]  (Abnormal) Collected: 07/18/24 2101    Order Status: Completed Lab Status: Final result Updated: 07/21/24 0724    Specimen: Blood from Hand, Right      Culture Positive on the 1st day of incubation      Enterococcus faecalis     Comment: 2 of 2 bottles  Susceptibilities done on previous cultures       Blood Culture Peripheral Blood     Order Status: Canceled Lab Status: No result     Specimen: Peripheral Blood     Blood Culture Line, venous [[id number]]  (Abnormal)  (Susceptibility) Collected: 07/18/24 2024    Order Status: Completed Lab Status: Final result Updated: 07/21/24 0724    Specimen: Blood from Line, venous      Culture Positive on the 1st day of incubation      Enterococcus faecalis     Comment: 1 of 1 bottles       Narrative:      Only an Aerobic Blood Culture Bottle was collected, interpret results with caution.        Susceptibility       Enterococcus faecalis (1)       Antibiotic Interpretation Sensitivity   Method Status    Ampicillin Susceptible <=2 ug/mL PHILIP Final    Gentamicin Synergy Susceptible Susceptible ug/mL PHILIP Final     No high level gentamicin resistance found - therefore combination therapy with an aminoglycoside may be indicated for serious  enterococcal infections such as bacteremia and endocarditis.       Streptomycin Synergy  [*]  Susceptible Susceptible ug/mL PHILIP Final    Ciprofloxacin  [*]  Susceptible <=0.5 ug/mL PHILIP Final    Levofloxacin  [*]  Susceptible 1 ug/mL PHILIP Final    Linezolid  [*]  Susceptible 2 ug/mL PHILIP Final    Vancomycin Susceptible 2 ug/mL PHILIP Final    Daptomycin  [*]  Susceptible 2 ug/mL PHILIP Final    Doxycycline  [*]  Intermediate 8 ug/mL PHILIP Final    Tigecycline  [*]  Susceptible <=0.12 ug/mL PHILIP Final    Nitrofurantoin  [*]  Susceptible <=16 ug/mL PHILIP Final               [*]  Suppressed Antibiotic                   Consultedigene GP Panel [[id number]]  (Abnormal) Collected: 07/18/24 2024    Order Status: Completed Lab Status: Final result Updated: 07/19/24 1420    Specimen: Blood from Line, venous      Staphylococcus species Not Detected     Staphylococcus aureus Not Detected     Staphylococcus epidermidis Not Detected     Staphylococcus lugdunensis Not Detected     Enterococcus faecalis Detected     Comment: Positive for Enterococcus faecalis and negative for Gage/vanB genes (not resistant to vancomycin) by [employer] multiplex nucleic acid test. Final identification and antimicrobial susceptibility testing will be verified by standard methods.        Enterococcus faecium Not Detected     Streptococcus species Not Detected     Streptococcus agalactiae Not Detected     Streptococcus anginosus group Not Detected     Streptococcus pneumoniae Not Detected     Streptococcus pyogenes Not Detected     Listeria species Not Detected    Narrative:      Specimen tested with Consultedigene multiplex, gram-positive blood culture nucleic acid test for the following targets: Staphylococcus aureus, Staphylococcus epidermidis, Staphylococcus lugdunensis, other Staphylococcus species, Enterococcus faecalis, Enterococcus faecium, Streptococcus species, Streptococcus agalactiae, Streptococcus anginosus group, Streptococcus pneumoniae, Streptococcus pyogenes,  Listeria species, mecA (methicillin resistance), and Gage/vanB (vancomycin resistance).            Recent Labs   Lab Test 22  1712 23  1445 24  0754 24  0950 24  2117   URINEPH 5.5 6.0 6.5 5.0 5.0   NITRITE Negative Positive* Negative Negative Negative   LEUKEST 250 Gigi/uL* 500 Gigi/uL* Negative Negative Large*   WBCU 32* >182* 1 19* >182*                   Recent Labs   Lab Test 24  1803   CDBPCT Positive*       Imaging: XR Chest Port 1 View    Result Date: 2024  XR CHEST PORT 1 VIEW   2024 11:46 AM HISTORY: RN placed PICC - verify tip placement COMPARISON: Chest radiograph 2024     IMPRESSION: Stable size of cardiomediastinal silhouette. Interval placement of right upper extremity PICC with tip overlying the superior vena cava. No definite airspace consolidation, pleural effusion or pneumothorax. Lungs are unchanged. OLIVIA LI MD   SYSTEM ID:  [id number]    Echocardiogram Complete    Result Date: 2024  [id number]^DELL^YOLI  [facility] Echocardi[facility] [address] [place] [address]  Name: CITLALY MICHEL MRN: [id number] : 1975 Study Date: 2024 02:43 PM Age: 48 yrs Gender: Female Patient Location: [facility] Reason For Study: Endocarditis Ordering Physician: YOLI SHARPE Referring Physician: Mikala Luna Performed By: Chelsey Robertson  BSA: 2.4 m2 Height: 64 in Weight: 322 lb HR: 73 BP: 120/77 mmHg ______________________________________________________________________________ Procedure Complete Portable Echo Adult. Optison (NDC #2427-0405) given intravenously. ______________________________________________________________________________ Interpretation Summary  Hyperdynamic left ventricular function.The visual ejection fraction is >70%. Mildly decreased right ventricular systolic function.The right ventricle is moderate to severely dilated. Moderate bi-atrial enlargement. There is severe  mitral annular calcification.There is mild mitral stenosis.There is mild to moderate (1-2+) tricuspid regurgitation. Severe pulmonary hypertension- RVSP 82 mm hg +RA. IVC diameter >2.1 cm collapsing <50% with sniff suggests a high RA pressure estimated at 15 mmHg or greater.  No obvious vegetations noted within the limits of a transthoracic echocardiogram. Can consider INNA if high clinical suspicion of endocarditis. ______________________________________________________________________________ Left Ventricle The left ventricle is normal in size. There is normal left ventricular wall thickness. Diastolic function not assessed due to significant mitral annular calcification. Hyperdynamic left ventricular function. The visual ejection fraction is >70%. No regional wall motion abnormalities noted.  Right Ventricle The right ventricle is moderate to severely dilated. Mildly decreased right ventricular systolic function.  Atria The left atrium is moderately dilated. The right atrium is moderately dilated.  Mitral Valve There is severe mitral annular calcification. There is trace mitral regurgitation. There is mild mitral stenosis. The mean mitral valve gradient is 3.4 mmHg.  Tricuspid Valve There is mild to moderate (1-2+) tricuspid regurgitation. The right ventricular systolic pressure is approximated at 82.4 mmHg plus the right atrial pressure. Pulmonary hypertension.  Aortic Valve Aortic valve sclerosis. No aortic regurgitation is present. No aortic stenosis is present.  Pulmonic Valve There is trace pulmonic valvular regurgitation. There is no pulmonic valvular stenosis.  Vessels Normal size aorta. Ascending aorta dilatation is present. 3.9 cm. Dilation of the inferior vena cava is present with abnormal respiratory variation in diameter. IVC diameter >2.1 cm collapsing <50% with sniff suggests a high RA pressure estimated at 15 mmHg or greater.  Pericardium There is no pericardial effusion.  Rhythm Sinus rhythm was  noted. ______________________________________________________________________________ MMode/2D Measurements & Calculations IVSd: 1.2 cm  LVIDd: 4.4 cm LVIDs: 2.7 cm LVPWd: 1.4 cm FS: 39.7 % LV mass(C)d: 212.9 grams LV mass(C)dI: 88.9 grams/m2 Ao root diam: 3.6 cm LA dimension: 5.6 cm asc Aorta Diam: 3.9 cm LA/Ao: 1.6 LVOT diam: 1.9 cm LVOT area: 2.7 cm2 Ao root diam index Ht(cm/m): 2.2 Ao root diam index BSA (cm/m2): 1.5 Asc Ao diam index BSA (cm/m2): 1.6 Asc Ao diam index Ht(cm/m): 2.4 EF Biplane: 78.5 % LA Volume (BP): 113.0 ml  LA Volume Index (BP): 47.3 ml/m2 LA Volume Indexed (AL/bp): 48.0 ml/m2 RV Base: 4.9 cm RWT: 0.64 TAPSE: 1.4 cm  Doppler Measurements & Calculations MV E max jim: 102.0 cm/sec MV A max jim: 115.3 cm/sec MV E/A: 0.88 MV max P.0 mmHg MV mean PG: 3.4 mmHg MV V2 VTI: 38.1 cm MV P1/2t max jim: 110.1 cm/sec MV P1/2t: 83.2 msec MVA(P1/2t): 2.6 cm2 MV dec slope: 387.6 cm/sec2 MV dec time: 0.25 sec PA acc time: 0.07 sec TR max jim: 454.0 cm/sec TR max P.4 mmHg E/E' av.6 Lateral E/e': 16.6 Medial E/e': 16.5 RV S Jim: 15.9 cm/sec  ______________________________________________________________________________ Report approved by: Lian Moore 2024 04:04 PM       XR Chest Port 1 View    Result Date: 2024  XR CHEST PORT 1 VIEW   2024 12:26 PM HISTORY: RN placed PICC - verify tip placement COMPARISON: CT 2024     IMPRESSION: 1. Right PICC with the tip in the proximal to mid SVC. 2. Stable mildly enlarged cardiopericardial silhouette. 3. Minimal basilar atelectasis. No focal consolidation, pleural effusion, or pneumothorax identified. 4. Minimally elevated right hemidiaphragm. DONOVAN GARCIA MD   SYSTEM ID:  [id number]    CT Chest (PE) Abdomen Pelvis w Contrast    Result Date: 2024  EXAM: CT CHEST PE ABDOMEN PELVIS W CONTRAST LOCATION: [facility] DATE: 2024 INDICATION: Acute hypoxic respiratory failure and sepsis.  COMPARISON: Multiple priors with most recent 4/26/2024. TECHNIQUE: CT chest pulmonary angiogram and routine CT abdomen pelvis with IV contrast. Arterial phase through the chest and venous phase through the abdomen and pelvis. Multiplanar reformats and MIP reconstructions were performed. Dose reduction techniques were used. CONTRAST: 146 mL Isovue 370 FINDINGS: ANGIOGRAM CHEST: Enlargement of the central pulmonary arteries. No evidence for pulmonary embolism. Normal caliber thoracic aorta without dissection. LUNGS AND PLEURA: Mosaic attenuation throughout the pulmonary parenchyma. No consolidation, pleural fluid or pneumothorax. Calcified granulomas bilaterally. MEDIASTINUM/AXILLAE: Cardiac enlargement with biatrial enlargement. No pericardial fluid. No lymphadenopathy. CORONARY ARTERY CALCIFICATION: None. HEPATOBILIARY: Hepatomegaly. Diffuse hepatic steatosis. Suggestion of surface contour nodularity right hepatic lobe. Cholecystectomy. No biliary dilatation. PANCREAS: Normal. SPLEEN: Normal. ADRENAL GLANDS: Normal. KIDNEYS/BLADDER: Symmetric renal enhancement. Nonobstructing 3 mm calculus left upper renal pole. No urinary collecting system dilatation. Mild contrast excretion into the bladder. Bladder decompressed. BOWEL: No obstruction or inflammatory change. Appendix unremarkable. LYMPH NODES: Minimal interval increase in size of retroperitoneal lymph nodes in the upper abdomen. For example a lymph node in the periaortic space measures 0.8 cm, previously 0.6 cm. Multiple other lymph nodes also demonstrate a mild increase in size. VASCULATURE: Normal caliber aorta. Minimal vascular calcification. PELVIC ORGANS: Unchanged right adnexal dermoid. No surrounding inflammatory change. No free fluid. MUSCULOSKELETAL: Skin thickening and subcutaneous edema involving the patient's anterior abdominal/pelvic wall. Degenerative hypertrophic changes in the spine.     IMPRESSION: 1.  No pulmonary embolism. Enlargement of central  pulmonary arteries which can be seen with pulmonary arterial hypertension. 2.  Normal caliber aorta without dissection. 3.  Diffuse mosaic attenuation throughout the pulmonary parenchyma. These findings can be seen with air trapping related to reactive airways disease or viral etiologies. 4.  Hepatomegaly with diffuse hepatic steatosis. 5.  Nonobstructing 3 mm calculus left upper renal pole. 6.  Unchanged right adnexal dermoid cyst. No free fluid or surrounding inflammatory change.    CT Femur Thigh Left with Contrast    Result Date: 7/19/2024  EXAM: CT FEMUR THIGH LEFT WITH CONTRAST LOCATION: [facility] DATE: 7/18/2024 INDICATION: Lower extremity swelling. COMPARISON: None available. TECHNIQUE: IV contrast. Axial, sagittal and coronal thin-section reconstruction. Dose reduction techniques were used. CONTRAST: 146 mL Isovue-370. FINDINGS: There is diffuse soft tissue swelling throughout the cutaneous and subcutaneous tissues of the visualized left thigh, greatest medially. This extends deep to abut the superficial fascia, without evidence of deep fascial involvement. Muscularity appears normal in bulk and attenuation. No evidence of abscess or soft tissue gas. Of note, soft tissues is incompletely visualized distally into the lower leg, due to exclusion from field-of-view. Milder soft tissue swelling is noted within the medial aspect of the right thigh. No CT evidence of acute fracture or malalignment. Mild polyarticular osteoarthritic. No knee joint effusion. No identifiable left inguinal lymphadenopathy. A large dermoid cyst is present within the right adnexa, measuring up to 9.3 cm and best appreciated on right thigh CT.     IMPRESSION: 1.  Diffuse soft tissue swelling throughout the cutaneous and subcutaneous tissues of the visualized left thigh, greatest medially. Findings may be attributable to a bland edema pattern or cellulitis. No evidence of abscess or soft tissue gas. 2.   Milder soft tissue swelling within the medial aspect of the right thigh. 3.  Large right adnexal dermoid cyst, measuring up to 9.3 cm. Gynecologic consultation is recommended.     CT Femur Thigh Right w Contrast    Result Date: 7/18/2024  EXAM: CT FEMUR THIGH RIGHT W CONTRAST LOCATION: [facility] DATE: 7/18/2024 INDICATION: 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 Abrupt sepsis with extensive chronic lymphedema. COMPARISON: Ultrasound venous bilateral lower extremity Doppler 3/16/2024. TECHNIQUE: IV contrast. Axial, sagittal and coronal thin-section reconstruction. Dose reduction techniques were used. CONTRAST: 146 mL Isovue-370 IV injected for CT pulmonary angiogram, CT abdomen and pelvis with IV contrast, no additional contrast injected for CT right femur. FINDINGS: BONES: -Anatomic alignment of the right femur without fracture, dislocation, lytic or destructive process. Mild degenerative changes right hip and visualized knee joint. SOFT TISSUES: -Right ovarian fatty dermoid. Superficial soft tissue edema involving the ventral abdominal wall fat. Diffuse soft tissue edema involving the right thigh to the edge of field-of-view. No discrete fluid collection to suggest abscess formation. No opaque foreign body or foci of soft tissue gas. Multiple superficial varicose veins. Partially visualized soft tissue edema involving the medial left thigh (please see separate CT left femur report).     IMPRESSION: 1.  Diffuse soft tissue edema involving the right thigh, more apparent distally. Partially visualized soft tissue edema involving the left thigh (please see separate CT left femur report). Superficial soft tissue edema involving the ventral abdominal wall fat anteroinferiorly. No discrete fluid collection, opaque foreign body or soft tissue gas. 2.  Right ovarian fatty dermoid.     XR Chest Port 1 View    Result Date: 7/18/2024  EXAM: XR CHEST PORT 1 VIEW LOCATION: [facility]  [facility] DATE: 7/18/2024 INDICATION: Respiratory Failure COMPARISON: Chest CT 4/18/2024     IMPRESSION: Stable enlargement of the cardiac silhouette. Possible pulmonary vascular congestion without patricia consolidation, pleural effusion or pneumothorax. No acute bony abnormality.    POC US CHEST B-SCAN    [facility] Procedure Note   Limited Bedside ED Cardiac Ultrasound: PROCEDURE: PERFORMED BY: Dr. Spenser Turner MD INDICATIONS/SYMPTOM:  Shortness of Breath PROBE: Cardiac phased array probe BODY LOCATION: Chest FINDINGS: Very difficult views due to body habitus. I could visualize the IVC and appears to be about 2+ cm dilated -and it collapses to about 1 cm. Patient was partial cardiac walls and they appear to be hyperkinetic.                                          INTERPRETATION: Difficult views.  IVC does appear to be at least 2 cm dilated and collapses to about 1 cm in consistent likely with normovolemia at the start the remainder of the echocardiogram was difficult to interpret due to body habitus. I had difficulty also visualizing the sliding lung sign. IMAGE DOCUMENTATION: Images were archived to PACs system.

## 2024-07-22 NOTE — PROGRESS NOTES
Pt does not have coverage for iv abx through their Medicare plan. Drug would be billed to the part D and supplies will be self-pay. Based on Vanco 1250mg q12h, total cost is $57.72 per day for drug and supplies.      For nursing, patient should have coverage if homebound, however [facility] is not contracted with Medicare and an outside nursing agency would be utilized instead. If patient is not homebound, there is no coverage and I can see patient if patient agrees to self-pay for $90 per visit.    Patient should have coverage in a TCU or infusion center.    ([facility]) In reference to admission date 07/18/2024.    Please contact Intake with any questions, [phone] or In Basket pool,  Home Infusion ([phone]).

## 2024-07-22 NOTE — PROCEDURES
[facility]    Single Lumen PICC Placement    Date/Time: 7/22/2024 11:38 AM    Performed by: Chiqui Goode, KINDRA  Authorized by: Agapito Urias MD  Indications: vascular access      UNIVERSAL PROTOCOL   Site Marked: Yes  Prior Images Obtained and Reviewed:  No  Required items: Required blood products, implants, devices and special equipment available    Patient identity confirmed:  Verbally with patient and arm band  NA - No sedation, light sedation, or local anesthesia  Confirmation Checklist:  Patient's identity using two indicators, relevant allergies, procedure was appropriate and matched the consent or emergent situation and correct equipment/implants were available  Time out: Immediately prior to the procedure a time out was called    Universal Protocol: the Joint Commission Universal Protocol was followed    Preparation: Patient was prepped and draped in usual sterile fashion    ESBL (mL):  1     ANESTHESIA    Anesthesia:  Local infiltration  Local Anesthetic:  Lidocaine 1% without epinephrine  Anesthetic Total (mL):  2      SEDATION    Patient Sedated: No        Preparation: skin prepped with ChloraPrep  Skin prep agent: skin prep agent completely dried prior to procedure  Sterile barriers: maximum sterile barriers were used: cap, mask, sterile gown, sterile gloves, and large sterile sheet  Hand hygiene: hand hygiene performed prior to central venous catheter insertion  Type of line used: PICC  Catheter type: single lumen  Lumen type: valved and power PICC  Catheter size: 4 Fr  Brand: Bard  Lot number: [id number]  Placement method: MST and ultrasound  Number of attempts: 1  Difficulty threading catheter: no  Successful placement: yes  Orientation: right  Catheter to Vein (%): 10  Location: basilic vein  Tip Location: SVC  Arm circumference: adults 10 cm  Extremity circumference: 43  Visible catheter length: 0  Total catheter length: 41  Dressing and securement:  chlorhexidine disc applied, securement device and transparent securement dressing  Post procedure assessment: placement verified by x-ray  PROCEDURE   Patient Tolerance:  Patient tolerated the procedure well with no immediate complicationsDescribe Procedure: Pt tolerated picc placement well. Good blood return. Flushes easily., Verified by xray. Ok to use.    Ok to use.   Disposal: sharps and needle count correct at the end of procedure, needles and guidewire disposed in sharps container

## 2024-07-22 NOTE — PROGRESS NOTES
[facility]    Hospitalist Progress Note    Date of Service (when I saw the patient): 07/22/2024    Assessment & Plan   Bess Enamorado is a 48 year old female with pmhx significant for chronic lymphedema complicated by recurrent BLLE cellulitis who presented with septic shock and was admitted to the ICU. Patient's shock resolved and she was downgraded to intermediate 07/20 and off HFNC 7/21.      Septic Shock, resolved  Enterococcus faecalis bacteremia  Secondary to BLE cellulitis, urinary tract infection (asymptomatic but has MS and sensory deficit)    Zosyn and vancomycin started on admission    TTE without vegetation (rare for E. faecalis to cause endocarditis, but also typically doesn't cause septic shock). Biatrial enlargement, LV EF hyperdynamic. Severe pulm htn    7/20: shock resolved last night ~10pm, off pressors; Removed PICC    Blood Culture x 2 (7/18) 2/2 + E. faecalis)    Blood Cx (7/20 6am): NG x 2-days  -Change antibiotics to ampicillin, but discharged on linezolid to complete 14-day course from last negative blood culture.     Acute on chronic respiratory failure with hypoxemia  Patient endorses increased o2 requirements during illness previously, likely related to MS    Patient uses 2 liters O2 at home for her CHF    Presented in ED on 10 liters, then transition to BiPAP, now on 3 L  -SpO2 goal >90%  -Wean as tolerated     Hyperbilirubinemia, unconjugated  Hx of lap-tamy  Likely gilberts in setting of sepsis  -T bili ~3.3, D bili 0.65     Diastolic CHF  Severe pHTN (RVSP 82mmHg)  Severe Mitral Annular Calcification  TTE 7/19/24: LV EF >70% hyperdynamic, Severe RV dilation, Moderate Bi-atrial Enlargement, Severe MAC,     Home meds; Toprol XL 25mg daily, Aldactone 12.5mg Daily, Jardiance 10mg Daily    Toprol XL restarted 07/21 (1/2 dose) 12.5mg Daily    Jardiance restarted 07/21    Aldactone to start 07/22  -Continue to hold bumex while restarting anti-hypertensive agents  with diuretic effects (unclear how much diurteic patient will truly need, severe MAC + severe pHTN + chronic lymphedema may be difficult to diurese)     BLLE cellulitis, skin folds wounds  On IV antibiotics  -Wound care consult  -Nystatin cream for skin folds    Diet: Combination Diet 2 gm NA Diet    DVT Prophylaxis: Enoxaparin (Lovenox) SQ  Brar Catheter: Not present  Lines: None     Cardiac Monitoring: ACTIVE order. Indication: ICU  Code Status: Full Code      Disposition: Expected discharge in 1-2 days once outpatient antibiotic plan clarified.    50 MINUTES SPENT BY ME on the date of service doing chart review, history, exam, documentation & further activities per the note.    Agapito Urias MD    Interval History   Patient was comfortably in bed.  She has no acute complaints.    -Data reviewed today: I reviewed all new labs and imaging results over the last 24 hours. I personally reviewed no images or EKG's today.    Physical Exam   Temp: 97.7  F (36.5  C) Temp src: Axillary BP: 121/76 Pulse: 75   Resp: 16 SpO2: 90 % O2 Device: BiPAP/CPAP Oxygen Delivery: 3 LPM  Vitals:    07/19/24 0132   Weight: 146.5 kg (322 lb 15.6 oz)     Vital Signs with Ranges  Temp:  [97.7  F (36.5  C)-98.1  F (36.7  C)] 97.7  F (36.5  C)  Pulse:  [74-75] 75  Resp:  [16-22] 16  BP: (113-121)/(76-85) 121/76  SpO2:  [90 %-98 %] 90 %  I/O last 3 completed shifts:  In: 1300 [P.O.:840; I.V.:460]  Out: 1050 [Urine:1050]    Gen: Obese female, alert and oriented x 3, no acute distressed  HEENT: Atraumatic, normocephalic; sclera non-injected, anicterric; oral mucosa moist, no lesion, no exudate  Lungs: Bibasilar rales, no wheezes, no rhonchi  Heart: Regular rate, regular rhythm, no gallops, no rubs, no murmurs  GI: Bowel sound normal, no hepatosplenomegaly, no masses, non-tender, non-distended, no guarding, no rebound tenderness  Lymph: No lymphadenopathy, 3 + chronic BLE edema  Skin: No rashes, BLE nodular chronic venous stasis      Medications   Current Facility-Administered Medications   Medication Dose Route Frequency Provider Last Rate Last Admin    No lozenges or gum should be given while patient on BIPAP/AVAPS/AVAPS AE   Does not apply Continuous PRN Ian Ellis, DO        Patient may continue current oral medications   Does not apply Continuous PRN Ian Ellis, DO        sodium chloride 0.9 % infusion   Intravenous Continuous Agapito Urias MD 20 mL/hr at 07/19/24 0500 Rate Change at 07/19/24 0500     Current Facility-Administered Medications   Medication Dose Route Frequency Provider Last Rate Last Admin    aspirin EC tablet 81 mg  81 mg Oral Daily Ian Ellis DO   81 mg at 07/22/24 0832    empagliflozin (JARDIANCE) tablet 10 mg  10 mg Oral Daily Alex Porter, DO   10 mg at 07/22/24 0832    enoxaparin ANTICOAGULANT (LOVENOX) injection 40 mg  40 mg Subcutaneous Q12H Ian Ellis DO   40 mg at 07/22/24 0107    metoprolol succinate ER (TOPROL-XL) 24 hr half-tab 12.5 mg  12.5 mg Oral Daily Alex Porter, DO   12.5 mg at 07/22/24 0833    nystatin (MYCOSTATIN) cream   Topical BID Ian Ellis DO   Given at 07/22/24 0836    sertraline (ZOLOFT) tablet 100 mg  100 mg Oral Daily Ian Ellis, DO   100 mg at 07/22/24 0832    sodium chloride (PF) 0.9% PF flush 10-40 mL  10-40 mL Intracatheter Q7 Days Ian Ellis, DO   20 mL at 07/19/24 1334    spironolactone (ALDACTONE) half-tab 12.5 mg  12.5 mg Oral Daily Kane Porteri, DO   12.5 mg at 07/22/24 0833    vancomycin (VANCOCIN) 1,250 mg in sodium chloride 0.9 % 250 mL intermittent infusion  1,250 mg Intravenous Q12H Ian Ellis DO   1,250 mg at 07/22/24 1018       Data   Recent Labs   Lab 07/22/24  0759 07/22/24  0537 07/21/24  0519 07/20/24  0626 07/20/24  0610   WBC  --  4.1 4.5  --  7.2   HGB  --  13.0 12.9  --  12.9   MCV  --  90 90  --  90   PLT  --  146* 130*  --  123*   NA  --  136 134*  --  134*   POTASSIUM  --  3.9 4.0  --  3.8    CHLORIDE  --  104 104  --  102   CO2  --  22 22  --  24   BUN  --  11.2 13.2  --  14.0   CR  --  0.73 0.70  --  0.73   ANIONGAP  --  10 8  --  8   EVITA  --  9.7 9.5  --  9.5   GLC 82 81 88   < > 90   ALBUMIN  --  3.0* 3.0*  --  3.0*  3.0*   PROTTOTAL  --  7.2 7.0  --  7.0   BILITOTAL  --  2.2* 2.3*  --  3.3*   ALKPHOS  --  64 63  --  61   ALT  --  7 7  --  <5   AST  --  16 19  --  21    < > = values in this interval not displayed.       No results found for this or any previous visit (from the past 24 hour(s)).

## 2024-07-22 NOTE — PLAN OF CARE
Pt alert and oriented x4. Pleasant and cooperative with staff. Slept well throughout night wearing home CPAP w/3L bleed in O2. Denies pain. Purewic periodically becoming dislodged. Pt hopeful to discharge. Makes needs known.

## 2024-07-22 NOTE — PROGRESS NOTES
End Of Shift Note    Situation: Patient admitted for BLE cellulitis/ septic shock, resolved.     Plan: Patient to be discharged home with IV abx therapy, SL PICC placed to RUE, CXR complete and ok to use/ draw blood.    Subjective/Objective:    Neuro: Alert/ oriented x4, able to make needs known with use of call light    Cardiac: WNL    Resp: 2LPNC, baseline    GI/: purewick in place, no BM this shift    Endo: intact    MSK: lb lift used today and patient sat in chair for 90 minutes.      Skin: excoriated BLE, groin and under both breasts.    LDAs: PICC

## 2024-07-23 VITALS
DIASTOLIC BLOOD PRESSURE: 85 MMHG | BODY MASS INDEX: 50.02 KG/M2 | RESPIRATION RATE: 20 BRPM | HEART RATE: 75 BPM | TEMPERATURE: 98.5 F | OXYGEN SATURATION: 88 % | SYSTOLIC BLOOD PRESSURE: 122 MMHG | WEIGHT: 293 LBS | HEIGHT: 64 IN

## 2024-07-23 LAB
ALBUMIN SERPL BCG-MCNC: 3.2 G/DL (ref 3.5–5.2)
ANION GAP SERPL CALCULATED.3IONS-SCNC: 8 MMOL/L (ref 7–15)
BASOPHILS # BLD AUTO: 0.1 10E3/UL (ref 0–0.2)
BASOPHILS NFR BLD AUTO: 1 %
BUN SERPL-MCNC: 10.3 MG/DL (ref 6–20)
CALCIUM SERPL-MCNC: 9.6 MG/DL (ref 8.8–10.4)
CHLORIDE SERPL-SCNC: 104 MMOL/L (ref 98–107)
CREAT SERPL-MCNC: 0.74 MG/DL (ref 0.51–0.95)
EGFRCR SERPLBLD CKD-EPI 2021: >90 ML/MIN/1.73M2
EOSINOPHIL # BLD AUTO: 0.1 10E3/UL (ref 0–0.7)
EOSINOPHIL NFR BLD AUTO: 4 %
ERYTHROCYTE [DISTWIDTH] IN BLOOD BY AUTOMATED COUNT: 18.1 % (ref 10–15)
GLUCOSE SERPL-MCNC: 75 MG/DL (ref 70–99)
HCO3 SERPL-SCNC: 23 MMOL/L (ref 22–29)
HCT VFR BLD AUTO: 42.4 % (ref 35–47)
HGB BLD-MCNC: 13.4 G/DL (ref 11.7–15.7)
IMM GRANULOCYTES # BLD: 0 10E3/UL
IMM GRANULOCYTES NFR BLD: 1 %
LYMPHOCYTES # BLD AUTO: 1.1 10E3/UL (ref 0.8–5.3)
LYMPHOCYTES NFR BLD AUTO: 28 %
MCH RBC QN AUTO: 28.5 PG (ref 26.5–33)
MCHC RBC AUTO-ENTMCNC: 31.6 G/DL (ref 31.5–36.5)
MCV RBC AUTO: 90 FL (ref 78–100)
MONOCYTES # BLD AUTO: 0.4 10E3/UL (ref 0–1.3)
MONOCYTES NFR BLD AUTO: 11 %
NEUTROPHILS # BLD AUTO: 2.1 10E3/UL (ref 1.6–8.3)
NEUTROPHILS NFR BLD AUTO: 55 %
NRBC # BLD AUTO: 0 10E3/UL
NRBC BLD AUTO-RTO: 0 /100
PHOSPHATE SERPL-MCNC: 3.2 MG/DL (ref 2.5–4.5)
PLATELET # BLD AUTO: 145 10E3/UL (ref 150–450)
POTASSIUM SERPL-SCNC: 4.1 MMOL/L (ref 3.4–5.3)
RBC # BLD AUTO: 4.7 10E6/UL (ref 3.8–5.2)
SODIUM SERPL-SCNC: 135 MMOL/L (ref 135–145)
WBC # BLD AUTO: 3.7 10E3/UL (ref 4–11)

## 2024-07-23 PROCEDURE — 250N000013 HC RX MED GY IP 250 OP 250 PS 637: Performed by: INTERNAL MEDICINE

## 2024-07-23 PROCEDURE — 999N000197 HC STATISTIC WOC PT EDUCATION, 0-15 MIN

## 2024-07-23 PROCEDURE — 250N000013 HC RX MED GY IP 250 OP 250 PS 637: Performed by: STUDENT IN AN ORGANIZED HEALTH CARE EDUCATION/TRAINING PROGRAM

## 2024-07-23 PROCEDURE — 85025 COMPLETE CBC W/AUTO DIFF WBC: CPT | Performed by: STUDENT IN AN ORGANIZED HEALTH CARE EDUCATION/TRAINING PROGRAM

## 2024-07-23 PROCEDURE — 250N000011 HC RX IP 250 OP 636: Performed by: INTERNAL MEDICINE

## 2024-07-23 PROCEDURE — 99239 HOSP IP/OBS DSCHRG MGMT >30: CPT | Performed by: INTERNAL MEDICINE

## 2024-07-23 PROCEDURE — 80069 RENAL FUNCTION PANEL: CPT | Performed by: STUDENT IN AN ORGANIZED HEALTH CARE EDUCATION/TRAINING PROGRAM

## 2024-07-23 RX ORDER — METOPROLOL SUCCINATE 25 MG/1
12.5 TABLET, EXTENDED RELEASE ORAL DAILY
COMMUNITY
Start: 2024-07-24

## 2024-07-23 RX ORDER — SACCHAROMYCES BOULARDII 250 MG
250 CAPSULE ORAL 2 TIMES DAILY
Qty: 60 CAPSULE | Refills: 0 | Status: SHIPPED | OUTPATIENT
Start: 2024-07-23

## 2024-07-23 RX ORDER — POTASSIUM CHLORIDE 1500 MG/1
20 TABLET, EXTENDED RELEASE ORAL DAILY
Qty: 30 TABLET | Refills: 0 | Status: SHIPPED | OUTPATIENT
Start: 2024-07-23

## 2024-07-23 RX ORDER — LINEZOLID 600 MG/1
600 TABLET, FILM COATED ORAL 2 TIMES DAILY
Qty: 23 TABLET | Refills: 0 | Status: SHIPPED | OUTPATIENT
Start: 2024-07-23

## 2024-07-23 RX ORDER — SPIRONOLACTONE 25 MG/1
12.5 TABLET ORAL DAILY
Qty: 15 TABLET | Refills: 0 | Status: SHIPPED | OUTPATIENT
Start: 2024-07-23

## 2024-07-23 RX ADMIN — SERTRALINE HYDROCHLORIDE 100 MG: 100 TABLET ORAL at 08:30

## 2024-07-23 RX ADMIN — ENOXAPARIN SODIUM 40 MG: 40 INJECTION SUBCUTANEOUS at 02:14

## 2024-07-23 RX ADMIN — SPIRONOLACTONE 12.5 MG: 25 TABLET, FILM COATED ORAL at 08:48

## 2024-07-23 RX ADMIN — METOPROLOL SUCCINATE 12.5 MG: 25 TABLET, EXTENDED RELEASE ORAL at 08:30

## 2024-07-23 RX ADMIN — EMPAGLIFLOZIN 10 MG: 10 TABLET, FILM COATED ORAL at 08:30

## 2024-07-23 RX ADMIN — ASPIRIN 81 MG: 81 TABLET, COATED ORAL at 08:30

## 2024-07-23 RX ADMIN — AMPICILLIN SODIUM 2 G: 2 INJECTION, POWDER, FOR SOLUTION INTRAMUSCULAR; INTRAVENOUS at 07:01

## 2024-07-23 RX ADMIN — NYSTATIN: 100000 CREAM TOPICAL at 08:31

## 2024-07-23 RX ADMIN — AMPICILLIN SODIUM 2 G: 2 INJECTION, POWDER, FOR SOLUTION INTRAMUSCULAR; INTRAVENOUS at 02:14

## 2024-07-23 RX ADMIN — AMPICILLIN SODIUM 2 G: 2 INJECTION, POWDER, FOR SOLUTION INTRAMUSCULAR; INTRAVENOUS at 10:00

## 2024-07-23 ASSESSMENT — ACTIVITIES OF DAILY LIVING (ADL)
ADLS_ACUITY_SCORE: 40
ADLS_ACUITY_SCORE: 42
ADLS_ACUITY_SCORE: 42
ADLS_ACUITY_SCORE: 40
ADLS_ACUITY_SCORE: 42
ADLS_ACUITY_SCORE: 41
ADLS_ACUITY_SCORE: 42
ADLS_ACUITY_SCORE: 40
ADLS_ACUITY_SCORE: 41
ADLS_ACUITY_SCORE: 40

## 2024-07-23 NOTE — DISCHARGE INSTRUCTIONS
Skin fold cares:  Daily  to twice daily as needed for moisture:  1.) Cleanse skin folds with soap and warm water and rinse, pat dry ensuring skin is fully dry  2.) Apply strips of Silvercel Alginate over open fissures (currently left pannus and bilateral knees)  3.) Apply Interdry Ag to skin folds OR if not available then lightly dust skin folds with Miconazole powder  To use Interdry Ag-   Wash skin gently. Pat dry, do not rub.  Cut the appropriate size of fabric with clean scissors, allowing a minimum of 2 inches of the fabric exposed outside the skin fold for moisture evaporation.  Lay a single layer of fabric in the skin fold, placing one edge of the fabric into the base of the fold. Gently smooth the rest of the fabric over the skin, keeping it flat. Leave at least 2 inches of the fabric exposed outside the skin fold.  Secure the fabric in one of several ways; with the skin fold, with a small amount of tape, or tucked under clothing.  When to Dispose: Each piece of InterDry may be used up to 5 days, depending on fabric soiling, odor, amount of moisture and general skin condition. May label with skin marker to date    Removal: Remove the fabric before bathing and reuse when finished. When removing the fabric from skin folds, gently separate the skin fold and lift away fabric.

## 2024-07-23 NOTE — PLAN OF CARE
Occupational Therapy Discharge Summary    Reason for therapy discharge:    Discharged to home.    Progress towards therapy goal(s). See goals on Care Plan in [facility] electronic health record for goal details.  Goals not met.  Barriers to achieving goals:   discharge from facility.    Therapy recommendation(s):    Continued therapy is recommended.  Rationale/Recommendations:  recommend skilled OT for increased overall strength and act claude for ind with transfers and BADLs.

## 2024-07-23 NOTE — PLAN OF CARE
New fissures noted in skin folds and creases today. Photos taken, WOC consult placed, wound cares completed. Skin is at high risk for compromise.

## 2024-07-23 NOTE — PLAN OF CARE
End Of Shift Note    Situation: Sepsis d/t BLE cellulitis and UTI. Open areas in skin folds.     Plan: ABX. Wound care.     Subjective/Objective:    Neuro: A&O. Numbness present in BLE.     Cardiac: Normotensive.     Resp: Baseline 2L NC. Uses home CPAP.     GI/: Purewick. Good UOP    MSK: Encouraged pt to pivot to commode. Pt stated that she is not mobile at home. She has a power lift chair and she pivots to commode.     Skin: Redness BLE. Blister noted under panus. Fissure noted left panus fold. Fissure noted on right posterior knee. Intra dry placed in all folds.     LDAs: PICC SL

## 2024-07-23 NOTE — PLAN OF CARE
[facility] DISCHARGE NOTE    Patient discharged to home at 1:54 PM via wheel chair. Accompanied by transport staff. Discharge instructions reviewed with patient, opportunity offered to ask questions. Prescriptions sent to patients preferred pharmacy. All belongings sent with patient.    PICC removed, wound care completed, and discharge plan reviewed.    Meds need to be picked up at [facility] by family member when ready.     Rehan Robertson RN

## 2024-07-23 NOTE — PROGRESS NOTES
Care Management Discharge Note    Discharge Date: 07/23/2024       Discharge Disposition: Home, Home Care, Home Infusion    Discharge Services: Home Care, Housekeeping/[employer], Transportation Services    Discharge DME: None    Discharge Transportation: agency    Private pay costs discussed: transportation costs    Does the patient's insurance plan have a 3 day qualifying hospital stay waiver?  No    PAS Confirmation Code:    Patient/family educated on Medicare website which has current facility and service quality ratings: no    Education Provided on the Discharge Plan:    Persons Notified of Discharge Plans: Patient, bedside RN  Patient/Family in Agreement with the Plan: yes    Handoff Referral Completed: Yes    Additional Information:  Per MD at IDT rounds pt is medically stable for discharge today, per IDT rounds pt will no longer need IV antibiotics and will go home with oral antibiotics.     CM met bedside with patient to discuss discharge plan, pt will return home with [facility] (Phone: [phone])  RN,GIGIA, PT services. Patient denied going to TCU and is wanting to return home. Handoff sent to pts PCP and w/c transport set up per pts request. CM updated [facility] liaison silvio DODSON CM also updated [facility] liaison with cancellation of IV antibiotics.    Plan: Home with [facility] (Phone: [phone]) RN,HHA,PT    Transport: [facility] via w/c between 12:40-1:30    Aimee Donald RNCM  Care Transitions Registered Nurse  Tele: [phone]

## 2024-07-23 NOTE — CONSULTS
[facility]  WO Nurse Inpatient Assessment     Consulted for: Virtual consult done as WOC nurse is off site today, skin folds with lack of improvement with current plan of care (Nystatin ointment)    Summary: Pt would benefit from long term use of Interdry Ag for skin fold moisture management, though this is not covered by insurance and can be costly. Will recommend on discharge orders but provide alternative recommendations as well. Recommend discontinuation of Nystatin ointment as this can add moisture to skin folds and not be affective against fungal infections, use Miconazole powder in it's place. For open fissures can use strips of silver alginate dressing, (Silvercel).      Patient History (according to provider note(s):      Bess Enamorado is a 48 year old female with pmhx significant for chronic lymphedema complicated by recurrent BLLE cellulitis who presented with septic shock and was admitted to the ICU. Patient's shock resolved and she was downgraded to intermediate 07/20 and off HFNC 7/21.      Septic Shock, resolved  Enterococcus faecalis bacteremia  Secondary to BLE cellulitis, urinary tract infection (asymptomatic but has MS and sensory deficit)    Zosyn and vancomycin started on admission    TTE without vegetation (rare for E. faecalis to cause endocarditis, but also typically doesn't cause septic shock). Biatrial enlargement, LV EF hyperdynamic. Severe pulm htn    7/20: shock resolved last night ~10pm, off pressors; Removed PICC    Blood Culture x 2 (7/18) 2/2 + E. faecalis)    Blood Cx (7/20 6am): NG x 2-days  -Change antibiotics to ampicillin, but discharged on linezolid to complete 14-day course from last negative blood culture.     Acute on chronic respiratory failure with hypoxemia  Patient endorses increased o2 requirements during illness previously, likely related to MS    Patient uses 2 liters O2 at home for her CHF    Presented in ED on 10 liters, then transition  to BiPAP, now on 3 L  -SpO2 goal >90%  -Wean as tolerated     Hyperbilirubinemia, unconjugated  Hx of lap-tamy  Likely gilberts in setting of sepsis  -T bili ~3.3, D bili 0.65      Diastolic CHF  Severe pHTN (RVSP 82mmHg)  Severe Mitral Annular Calcification  TTE 7/19/24: LV EF >70% hyperdynamic, Severe RV dilation, Moderate Bi-atrial Enlargement, Severe MAC,     Home meds; Toprol XL 25mg daily, Aldactone 12.5mg Daily, Jardiance 10mg Daily    Toprol XL restarted 07/21 (1/2 dose) 12.5mg Daily    Jardiance restarted 07/21    Aldactone to start 07/22  -Continue to hold bumex while restarting anti-hypertensive agents with diuretic effects (unclear how much diurteic patient will truly need, severe MAC + severe pHTN + chronic lymphedema may be difficult to diurese)     BLLE cellulitis, skin folds wounds  On IV antibiotics  -Wound care consult  -Nystatin cream for skin folds    Assessment:      Areas visualized during today's visit:  skin folds as below per photos:       Right breast, left breast    Right pannus, left pannus      Right knee, left knee    Treatment Plan:        Skin fold cares:  Daily  to twice daily as needed for moisture:  1.) Cleanse skin folds with soap and warm water and rinse, pat dry ensuring skin is fully dry  2.) Apply strips of Silvercel Alginate over open fissures (currently left pannus and bilateral knees)  3.) Apply Interdry Ag to skin folds OR if not available then lightly dust skin folds with Miconazole powder  To use Interdry Ag-   Wash skin gently. Pat dry, do not rub.  Cut the appropriate size of fabric with clean scissors, allowing a minimum of 2 inches of the fabric exposed outside the skin fold for moisture evaporation.  Lay a single layer of fabric in the skin fold, placing one edge of the fabric into the base of the fold. Gently smooth the rest of the fabric over the skin, keeping it flat. Leave at least 2 inches of the fabric exposed outside the skin fold.  Secure the fabric in one  of several ways; with the skin fold, with a small amount of tape, or tucked under clothing.  When to Dispose: Each piece of InterDry may be used up to 5 days, depending on fabric soiling, odor, amount of moisture and general skin condition. May label with skin marker to date    Removal: Remove the fabric before bathing and reuse when finished. When removing the fabric from skin folds, gently separate the skin fold and lift away fabric.        Orders: Written    RECOMMEND PRIMARY TEAM ORDER: Antifungal miconazole powder, (discontinue Nystatin)- message sent to Dr. Urias who is in agreement  Education provided:  n/a  Discussed plan of care with:  none- new orders placed  WO nurse follow-up plan: signing off (pt discharging today, discharge orders updated)  Notify WOC if wound(s) deteriorate.  Nursing to notify the Provider(s) and re-consult the WO Nurse if new skin concern.    DATA:   BMI: Body mass index is 55.44 kg/m .   Active diet order: Orders Placed This Encounter      Combination Diet 2 gm NA Diet      Diet     Output: I/O last 3 completed shifts:  In: -   Out: 2300 [Urine:2300]     Labs:   Recent Labs   Lab 07/23/24  0654   ALBUMIN 3.2*   HGB 13.4   WBC 3.7*     Pressure injury risk assessment:   Sensory Perception: 3-->slightly limited  Moisture: 2-->very moist  Activity: 1-->bedfast  Mobility: 2-->very limited  Nutrition: 3-->adequate  Friction and Shear: 3-->no apparent problem  Ben Score: 14    Sirena Wilcox RN, CWOCN  Pager no longer is use, please contact through [employer] group: [facility] Nurse   Dept. Office Number: [phone]

## 2024-07-23 NOTE — DISCHARGE SUMMARY
[facility]  Hospitalist Discharge Summary       Date of Admission:  7/18/2024  Date of Discharge:  7/23/2024  1:54 PM  Discharging Provider: Agapito Urias MD      Discharge Diagnoses     Septic Shock, resolved  Enterococcus faecalis bacteremia  Acute on chronic respiratory failure with hypoxemia  Hyperbilirubinemia, unconjugated  Hx of lap-tamy  Diastolic CHF  Severe pHTN (RVSP 82mmHg)  Severe Mitral Annular Calcification  BLLE cellulitis, skin folds wounds    Follow-ups Needed After Discharge   Follow-up Appointments     Follow-up and recommended labs and tests       Follow up with primary care provider, Mikala Luna, within 7 days for   hospital follow- up.  The following labs/tests are recommended: CBC and   CMP.    Repeat blood culture in 14 days.          Unresulted Labs Ordered in the Past 30 Days of this Admission       Date and Time Order Name Status Description    7/20/2024  5:22 AM Blood Culture Line, venous Preliminary         These results will be followed up by Agapito Urias or PCP    Discharge Disposition   Discharged to home  Condition at discharge: Stable    Hospital Course   Bess Enamorado is a 48 year old female with pmhx significant for chronic lymphedema complicated by recurrent BLLE cellulitis who presented with septic shock and was admitted to the ICU. Patient's shock resolved and she was downgraded to intermediate 07/20 and off HFNC 7/21.      Septic Shock, resolved  Enterococcus faecalis bacteremia  Secondary to BLE cellulitis, urinary tract infection (asymptomatic but has MS and sensory deficit)    Zosyn and vancomycin started on admission    TTE without vegetation (rare for E. faecalis to cause endocarditis, but also typically doesn't cause septic shock). Biatrial enlargement, LV EF hyperdynamic. Severe pulm htn    7/20: shock resolved last night ~10pm, off pressors; Removed PICC    Blood Culture x 2 (7/18) 2/2 + E. faecalis)    Blood Cx (7/20 6am): NG x  2-days  - Change antibiotics to ampicillin for remainder of hospitalization, then discharge on linezolid to complete 14-day course from last negative blood culture.     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  -T bili ~3.3, D bili 0.65   -Follow-up CMP with PCP    Diastolic CHF  Severe pHTN (RVSP 82mmHg)  Severe Mitral Annular Calcification  TTE 7/19/24: LV EF >70% hyperdynamic, Severe RV dilation, Moderate Bi-atrial Enlargement, Severe MAC,     Home meds; Toprol XL 25mg daily, Aldactone 12.5mg Daily, Jardiance 10mg Daily    Toprol XL restarted 07/21 at reduced dose 12.5mg daily    Jardiance restarted 07/21    Aldactone to start 07/22 at reduced dose 12.5mg daily  -Resume Bumex at discharge     BLLE cellulitis, skin folds wounds  On IV antibiotics  -Wound care consult appreciated  -miconazole cream for skin folds    Consultations This Hospital Stay   PHARMACY TO DOSE VANCO  PHYSICAL THERAPY ADULT IP CONSULT  OCCUPATIONAL THERAPY ADULT IP CONSULT  VASCULAR ACCESS ADULT IP CONSULT  WOUND OSTOMY CONTINENCE NURSE  IP CONSULT  LYMPHEDEMA THERAPY IP CONSULT  CARE MANAGEMENT / SOCIAL WORK IP CONSULT  VASCULAR ACCESS ADULT IP CONSULT  WOUND OSTOMY CONTINENCE NURSE  IP CONSULT    Code Status   Full Code    40 MINUTES SPENT BY ME on the date of service doing chart review, history, exam, documentation & further activities per the note.         Agapito Urias MD  [facility]  ______________________________________________________________________    Physical Exam   Vital Signs: Temp: 98.5  F (36.9  C) Temp src: Oral BP: 122/85 Pulse: 75   Resp: 20 SpO2: (!)  88 % O2 Device: Nasal cannula Oxygen Delivery: 4 LPM  Weight: 322 lbs 15.58 oz       Gen: Obese female, alert and oriented x 3, no acute distressed  HEENT: Atraumatic, normocephalic; sclera non-injected, anicterric; oral mucosa moist, no lesion, no exudate  Lungs: Bibasilar rales, no wheezes, no rhonchi  Heart: Regular rate, regular rhythm, no gallops, no rubs, no murmurs  GI: Bowel sound normal, no hepatosplenomegaly, no masses, non-tender, non-distended, no guarding, no rebound tenderness  Lymph: No lymphadenopathy, 3 + chronic BLE edema  Skin: BLE nodular chronic venous stasis     Primary Care Physician   Mikala Luna    Discharge Orders      Home Care Referral      Reason for your hospital stay    This is a 48-year-old female admitted with sepsis and cellulitis due to Enterococcus faecalis.     Follow-up and recommended labs and tests     Follow up with primary care provider, Mikala Luna, within 7 days for hospital follow- up.  The following labs/tests are recommended: CBC and CMP.    Repeat blood culture in 14 days.     Activity    Your activity upon discharge: activity as tolerated     Full Code     Diet    Follow this diet upon discharge: Orders Placed This Encounter      Combination Diet 2 gm NA Diet, 2 L/day fluid restriction       Significant Results and Procedures   Most Recent 3 CBC's:  Recent Labs   Lab Test 07/23/24  0654 07/22/24  0537 07/21/24  0519   WBC 3.7* 4.1 4.5   HGB 13.4 13.0 12.9   MCV 90 90 90   * 146* 130*     Most Recent 3 BMP's:  Recent Labs   Lab Test 07/23/24  0654 07/22/24  1205 07/22/24  0759 07/22/24  0537 07/21/24  0519     --   --  136 134*   POTASSIUM 4.1  --   --  3.9 4.0   CHLORIDE 104  --   --  104 104   CO2 23  --   --  22 22   BUN 10.3  --   --  11.2 13.2   CR 0.74  --   --  0.73 0.70   ANIONGAP 8  --   --  10 8   EVITA 9.6  --   --  9.7 9.5   GLC 75 88 82 81 88   ,   Results for orders placed or performed during the hospital encounter of 07/18/24   [facility] US  CHEST B-SCAN    Impression    [facility] Procedure Note      Limited Bedside ED Cardiac Ultrasound:    PROCEDURE: PERFORMED BY: Dr. Spenser Turner MD  INDICATIONS/SYMPTOM:  Shortness of Breath  PROBE: Cardiac phased array probe  BODY LOCATION: Chest  FINDINGS:     Very difficult views due to body habitus.  I could visualize the IVC and appears to be about 2+ cm dilated -and it collapses to about 1 cm.  Patient was partial cardiac walls and they appear to be hyperkinetic.                                             INTERPRETATION: Difficult views.  IVC does appear to be at least 2 cm dilated and collapses to about 1 cm in consistent likely with normovolemia at the start the remainder of the echocardiogram was difficult to interpret due to body habitus.    I had difficulty also visualizing the sliding lung sign.    IMAGE DOCUMENTATION: Images were archived to PACs system.         XR Chest Port 1 View    Narrative    EXAM: XR CHEST PORT 1 VIEW  LOCATION: [facility]  DATE: 7/18/2024    INDICATION: Respiratory Failure  COMPARISON: Chest CT 4/18/2024      Impression    IMPRESSION: Stable enlargement of the cardiac silhouette. Possible pulmonary vascular congestion without patricia consolidation, pleural effusion or pneumothorax. No acute bony abnormality.   CT Chest (PE) Abdomen Pelvis w Contrast    Narrative    EXAM: CT CHEST PE ABDOMEN PELVIS W CONTRAST  LOCATION: [facility]  DATE: 7/18/2024    INDICATION: Acute hypoxic respiratory failure and sepsis.  COMPARISON: Multiple priors with most recent 4/26/2024.  TECHNIQUE: CT chest pulmonary angiogram and routine CT abdomen pelvis with IV contrast. Arterial phase through the chest and venous phase through the abdomen and pelvis. Multiplanar reformats and MIP reconstructions were performed. Dose reduction   techniques were used.   CONTRAST: 146 mL Isovue 370    FINDINGS:  ANGIOGRAM CHEST: Enlargement of the central  pulmonary arteries. No evidence for pulmonary embolism. Normal caliber thoracic aorta without dissection.    LUNGS AND PLEURA: Mosaic attenuation throughout the pulmonary parenchyma. No consolidation, pleural fluid or pneumothorax. Calcified granulomas bilaterally.    MEDIASTINUM/AXILLAE: Cardiac enlargement with biatrial enlargement. No pericardial fluid. No lymphadenopathy.    CORONARY ARTERY CALCIFICATION: None.    HEPATOBILIARY: Hepatomegaly. Diffuse hepatic steatosis. Suggestion of surface contour nodularity right hepatic lobe. Cholecystectomy. No biliary dilatation.    PANCREAS: Normal.    SPLEEN: Normal.    ADRENAL GLANDS: Normal.    KIDNEYS/BLADDER: Symmetric renal enhancement. Nonobstructing 3 mm calculus left upper renal pole. No urinary collecting system dilatation. Mild contrast excretion into the bladder. Bladder decompressed.    BOWEL: No obstruction or inflammatory change. Appendix unremarkable.    LYMPH NODES: Minimal interval increase in size of retroperitoneal lymph nodes in the upper abdomen. For example a lymph node in the periaortic space measures 0.8 cm, previously 0.6 cm. Multiple other lymph nodes also demonstrate a mild increase in size.    VASCULATURE: Normal caliber aorta. Minimal vascular calcification.    PELVIC ORGANS: Unchanged right adnexal dermoid. No surrounding inflammatory change. No free fluid.    MUSCULOSKELETAL: Skin thickening and subcutaneous edema involving the patient's anterior abdominal/pelvic wall. Degenerative hypertrophic changes in the spine.      Impression    IMPRESSION:  1.  No pulmonary embolism. Enlargement of central pulmonary arteries which can be seen with pulmonary arterial hypertension.    2.  Normal caliber aorta without dissection.    3.  Diffuse mosaic attenuation throughout the pulmonary parenchyma. These findings can be seen with air trapping related to reactive airways disease or viral etiologies.    4.  Hepatomegaly with diffuse hepatic  steatosis.    5.  Nonobstructing 3 mm calculus left upper renal pole.    6.  Unchanged right adnexal dermoid cyst. No free fluid or surrounding inflammatory change.   CT Femur Thigh Right w Contrast    Narrative    EXAM: CT FEMUR THIGH RIGHT W CONTRAST  LOCATION: [facility]  DATE: 7/18/2024    INDICATION: 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 Abrupt sepsis with extensive chronic lymphedema.  COMPARISON: Ultrasound venous bilateral lower extremity Doppler 3/16/2024.  TECHNIQUE: IV contrast. Axial, sagittal and coronal thin-section reconstruction. Dose reduction techniques were used.   CONTRAST: 146 mL Isovue-370 IV injected for CT pulmonary angiogram, CT abdomen and pelvis with IV contrast, no additional contrast injected for CT right femur.    FINDINGS:     BONES:  -Anatomic alignment of the right femur without fracture, dislocation, lytic or destructive process. Mild degenerative changes right hip and visualized knee joint.    SOFT TISSUES:  -Right ovarian fatty dermoid. Superficial soft tissue edema involving the ventral abdominal wall fat. Diffuse soft tissue edema involving the right thigh to the edge of field-of-view. No discrete fluid collection to suggest abscess formation. No opaque   foreign body or foci of soft tissue gas. Multiple superficial varicose veins. Partially visualized soft tissue edema involving the medial left thigh (please see separate CT left femur report).      Impression    IMPRESSION:  1.  Diffuse soft tissue edema involving the right thigh, more apparent distally. Partially visualized soft tissue edema involving the left thigh (please see separate CT left femur report). Superficial soft tissue edema involving the ventral abdominal   wall fat anteroinferiorly. No discrete fluid collection, opaque foreign body or soft tissue gas.    2.  Right ovarian fatty dermoid.     CT Femur Thigh Left with Contrast    Narrative    EXAM: CT FEMUR THIGH LEFT  WITH CONTRAST  LOCATION: [facility]  DATE: 7/18/2024    INDICATION: Lower extremity swelling.  COMPARISON: None available.  TECHNIQUE: IV contrast. Axial, sagittal and coronal thin-section reconstruction. Dose reduction techniques were used.   CONTRAST: 146 mL Isovue-370.    FINDINGS:     There is diffuse soft tissue swelling throughout the cutaneous and subcutaneous tissues of the visualized left thigh, greatest medially. This extends deep to abut the superficial fascia, without evidence of deep fascial involvement. Muscularity appears   normal in bulk and attenuation. No evidence of abscess or soft tissue gas. Of note, soft tissues is incompletely visualized distally into the lower leg, due to exclusion from field-of-view.    Milder soft tissue swelling is noted within the medial aspect of the right thigh.    No CT evidence of acute fracture or malalignment. Mild polyarticular osteoarthritic. No knee joint effusion.    No identifiable left inguinal lymphadenopathy. A large dermoid cyst is present within the right adnexa, measuring up to 9.3 cm and best appreciated on right thigh CT.      Impression    IMPRESSION:  1.  Diffuse soft tissue swelling throughout the cutaneous and subcutaneous tissues of the visualized left thigh, greatest medially. Findings may be attributable to a bland edema pattern or cellulitis. No evidence of abscess or soft tissue gas.  2.  Milder soft tissue swelling within the medial aspect of the right thigh.  3.  Large right adnexal dermoid cyst, measuring up to 9.3 cm. Gynecologic consultation is recommended.     XR Chest Port 1 View    Narrative    XR CHEST PORT 1 VIEW   7/19/2024 12:26 PM     HISTORY: RN placed PICC - verify tip placement    COMPARISON: CT 7/18/2024      Impression    IMPRESSION:   1. Right PICC with the tip in the proximal to mid SVC.  2. Stable mildly enlarged cardiopericardial silhouette.  3. Minimal basilar atelectasis. No focal  consolidation, pleural  effusion, or pneumothorax identified.  4. Minimally elevated right hemidiaphragm.    DONOVAN GARCIA MD         SYSTEM ID:  [id number]   XR Chest Port 1 View    Narrative    XR CHEST PORT 1 VIEW   2024 11:46 AM     HISTORY: RN placed PICC - verify tip placement    COMPARISON: Chest radiograph 2024      Impression    IMPRESSION: Stable size of cardiomediastinal silhouette. Interval  placement of right upper extremity PICC with tip overlying the  superior vena cava. No definite airspace consolidation, pleural  effusion or pneumothorax. Lungs are unchanged.    OLIVIA LI MD         SYSTEM ID:  [id number]   Echocardiogram Complete     Value    LVEF  >70%    Narrative    [id number]  [id number]  [id number]  [id number]^DELL^YOLI     [facility]  Echocardiography Laboratory  [address] [place] [address]     Name: CITLALY MICHEL  MRN: [id number]  : 1975  Study Date: 2024 02:43 PM  Age: 48 yrs  Gender: Female  Patient Location: [facility]  Reason For Study: Endocarditis  Ordering Physician: YOLI SHARPE  Referring Physician: Mikala Luna  Performed By: Chelsey Robertson     BSA: 2.4 m2  Height: 64 in  Weight: 322 lb  HR: 73  BP: 120/77 mmHg  ______________________________________________________________________________  Procedure  Complete Portable Echo Adult. Optison (NDC #2468-0751) given intravenously.  ______________________________________________________________________________  Interpretation Summary     Hyperdynamic left ventricular function.The visual ejection fraction is >70%.  Mildly decreased right ventricular systolic function.The right ventricle is  moderate to severely dilated.  Moderate bi-atrial enlargement.  There is severe mitral annular calcification.There is mild mitral  stenosis.There is mild to moderate (1-2+) tricuspid regurgitation.  Severe pulmonary hypertension- RVSP 82 mm hg +RA.  IVC diameter >2.1 cm collapsing <50% with sniff  suggests a high RA pressure  estimated at 15 mmHg or greater.     No obvious vegetations noted within the limits of a transthoracic  echocardiogram.  Can consider INNA if high clinical suspicion of endocarditis.  ______________________________________________________________________________  Left Ventricle  The left ventricle is normal in size. There is normal left ventricular wall  thickness. Diastolic function not assessed due to significant mitral annular  calcification. Hyperdynamic left ventricular function. The visual ejection  fraction is >70%. No regional wall motion abnormalities noted.     Right Ventricle  The right ventricle is moderate to severely dilated. Mildly decreased right  ventricular systolic function.     Atria  The left atrium is moderately dilated. The right atrium is moderately dilated.     Mitral Valve  There is severe mitral annular calcification. There is trace mitral  regurgitation. There is mild mitral stenosis. The mean mitral valve gradient  is 3.4 mmHg.     Tricuspid Valve  There is mild to moderate (1-2+) tricuspid regurgitation. The right  ventricular systolic pressure is approximated at 82.4 mmHg plus the right  atrial pressure. Pulmonary hypertension.     Aortic Valve  Aortic valve sclerosis. No aortic regurgitation is present. No aortic stenosis  is present.     Pulmonic Valve  There is trace pulmonic valvular regurgitation. There is no pulmonic valvular  stenosis.     Vessels  Normal size aorta. Ascending aorta dilatation is present. 3.9 cm. Dilation of  the inferior vena cava is present with abnormal respiratory variation in  diameter. IVC diameter >2.1 cm collapsing <50% with sniff suggests a high RA  pressure estimated at 15 mmHg or greater.     Pericardium  There is no pericardial effusion.     Rhythm  Sinus rhythm was noted.  ______________________________________________________________________________  MMode/2D Measurements & Calculations  IVSd: 1.2 cm     LVIDd: 4.4  cm  LVIDs: 2.7 cm  LVPWd: 1.4 cm  FS: 39.7 %  LV mass(C)d: 212.9 grams  LV mass(C)dI: 88.9 grams/m2  Ao root diam: 3.6 cm  LA dimension: 5.6 cm  asc Aorta Diam: 3.9 cm  LA/Ao: 1.6  LVOT diam: 1.9 cm  LVOT area: 2.7 cm2  Ao root diam index Ht(cm/m): 2.2  Ao root diam index BSA (cm/m2): 1.5  Asc Ao diam index BSA (cm/m2): 1.6  Asc Ao diam index Ht(cm/m): 2.4  EF Biplane: 78.5 %  LA Volume (BP): 113.0 ml     LA Volume Index (BP): 47.3 ml/m2  LA Volume Indexed (AL/bp): 48.0 ml/m2  RV Base: 4.9 cm  RWT: 0.64  TAPSE: 1.4 cm     Doppler Measurements & Calculations  MV E max jim: 102.0 cm/sec  MV A max jim: 115.3 cm/sec  MV E/A: 0.88  MV max P.0 mmHg  MV mean PG: 3.4 mmHg  MV V2 VTI: 38.1 cm  MV P1/2t max jim: 110.1 cm/sec  MV P1/2t: 83.2 msec  MVA(P1/2t): 2.6 cm2  MV dec slope: 387.6 cm/sec2  MV dec time: 0.25 sec  PA acc time: 0.07 sec  TR max jim: 454.0 cm/sec  TR max P.4 mmHg  E/E' av.6  Lateral E/e': 16.6  Medial E/e': 16.5  RV S Jim: 15.9 cm/sec     ______________________________________________________________________________  Report approved by: Lian Moore 2024 04:04 PM           *Note: Due to a large number of results and/or encounters for the requested time period, some results have not been displayed. A complete set of results can be found in Results Review.       Discharge Medications   Current Discharge Medication List        START taking these medications    Details   linezolid (ZYVOX) 600 MG tablet Take 1 tablet (600 mg) by mouth 2 times daily  Qty: 23 tablet, Refills: 0    Associated Diagnoses: Cellulitis of left lower extremity      saccharomyces boulardii (FLORASTOR) 250 MG capsule Take 1 capsule (250 mg) by mouth 2 times daily  Qty: 60 capsule, Refills: 0    Associated Diagnoses: Cellulitis of left lower extremity           CONTINUE these medications which have CHANGED    Details   metoprolol succinate ER (TOPROL XL) 25 MG 24 hr tablet Take 0.5 tablets (12.5 mg) by mouth daily       miconazole (MICATIN) 2 % AERP powder Apply topically 2 times daily  Qty: 128 g, Refills: 3    Associated Diagnoses: Intertrigo      potassium chloride sheila ER (KLOR-CON M20) 20 MEQ CR tablet Take 1 tablet (20 mEq) by mouth daily  Qty: 30 tablet, Refills: 0    Associated Diagnoses: Chronic heart failure with preserved ejection fraction (H)      spironolactone (ALDACTONE) 25 MG tablet Take 0.5 tablets (12.5 mg) by mouth daily  Qty: 15 tablet, Refills: 0    Associated Diagnoses: Acute on chronic congestive heart failure, unspecified heart failure type (H)           CONTINUE these medications which have NOT CHANGED    Details   acetaminophen (TYLENOL) 650 MG CR tablet Take 650 mg by mouth every 8 hours as needed for mild pain or fever      aspirin (ASPIRIN LOW DOSE) 81 MG EC tablet Take 1 tablet (81 mg) by mouth daily    Associated Diagnoses: Chronic heart failure with preserved ejection fraction (H)      bumetanide (BUMEX) 2 MG tablet Take 2 tablets (4 mg) by mouth 2 times daily  Qty: 120 tablet, Refills: 2    Associated Diagnoses: Chronic heart failure with preserved ejection fraction (H)      Emollient (GOLD BOND MEDICATED BODY EX) Externally apply 1 Application topically 2 times daily as needed      empagliflozin (JARDIANCE) 10 MG TABS tablet Take 1 tablet (10 mg) by mouth daily  Qty: 90 tablet, Refills: 1    Associated Diagnoses: Acute on chronic congestive heart failure, unspecified heart failure type (H)      Pramoxine HCl (VAGISIL ANTI-ITCH MEDICATED EX) Externally apply topically as needed      sertraline (ZOLOFT) 100 MG tablet Take 1 tablet (100 mg) by mouth daily  Qty: 90 tablet, Refills: 3    Associated Diagnoses: Moderate episode of recurrent major depressive disorder (H); Generalized anxiety disorder           Allergies   Allergies   Allergen Reactions    Nkda [No Known Drug Allergy]

## 2024-07-24 ENCOUNTER — TELEPHONE (OUTPATIENT)
Dept: FAMILY MEDICINE | Facility: CLINIC | Age: 49
End: 2024-07-24
Payer: MEDICARE

## 2024-07-24 ENCOUNTER — PATIENT OUTREACH (OUTPATIENT)
Dept: CARE COORDINATION | Facility: CLINIC | Age: 49
End: 2024-07-24
Payer: MEDICARE

## 2024-07-24 ASSESSMENT — ACTIVITIES OF DAILY LIVING (ADL): DEPENDENT_IADLS:: CLEANING;TRANSPORTATION

## 2024-07-24 NOTE — TELEPHONE ENCOUNTER
Key with [facility] [phone]    Home Care is calling regarding an established patient with  [facility] [place].  {     Requesting orders from: Mikala Luna  Provider is following patient: Yes  Is this a 60-day recertification request?  No    Orders Requested    Skilled Nursing  Request for resumption in care.     2x wk/2 weeks then  1x wk/3 weeks    Wound Care Supplies    Also wanted provider to know that coccyx wound that was open previously is back open. Rn is cleansing and using foam dressing.   Also using miconazole powder on yeast in skin folds    Confirmed ok to leave a detailed message with call back.  Contact information confirmed and updated as needed.    Bam Merritt RN

## 2024-07-24 NOTE — TELEPHONE ENCOUNTER
Patient Contact    Attempt # 1    Was call answered?  No.  Left message on voicemail with orders as approved below.     Marce Wilcox RN on 7/24/2024 at 3:19 PM

## 2024-07-25 LAB — BACTERIA BLD CULT: NO GROWTH

## 2024-07-30 ENCOUNTER — TELEPHONE (OUTPATIENT)
Dept: CARDIOLOGY | Facility: CLINIC | Age: 49
End: 2024-07-30
Payer: MEDICARE

## 2024-07-30 NOTE — TELEPHONE ENCOUNTER
Health Call Center    Phone Message    May a detailed message be left on voicemail: yes     Reason for Call: Other: Patient called wanting to know if they are able to switch their CORE appt on 08/05 to virtual instead of in person. Please call patient back to further discuss.     Action Taken: Other: Cardiology    Travel Screening: Not Applicable     Thank you!  [facility]

## 2024-07-30 NOTE — TELEPHONE ENCOUNTER
Called and spoke with pt. Pt states she has no ride available to get her to appt. It is very hard for her to get rides and get around. Notified her that we always prefer in clinic visit especially for fluid status checks, but pt states she has not other options.   She will have [facility] draw her blood prior to visit.     Visit changed to virtual.    Pt was grateful for the assistance.     Heather Morris RN

## 2024-08-01 ENCOUNTER — TELEPHONE (OUTPATIENT)
Dept: FAMILY MEDICINE | Facility: CLINIC | Age: 49
End: 2024-08-01
Payer: MEDICARE

## 2024-08-01 ENCOUNTER — TELEPHONE (OUTPATIENT)
Dept: CARDIOLOGY | Facility: CLINIC | Age: 49
End: 2024-08-01
Payer: MEDICARE

## 2024-08-01 NOTE — TELEPHONE ENCOUNTER
Key, [facility] nurse, calls to ask about BMP orders; says that pt is supposed to have drawn prior to upcoming cardiology appt on 8/5/24, but home care hasn't received the order. Writer advised that there are future lab orders available, but that she'll need to contact the cardiology care team for assistance. She verbalized understanding.    Yanet Ohara RN  [facility]

## 2024-08-01 NOTE — TELEPHONE ENCOUNTER
Returned call t Pt's home care nurse. Pt to OV to video appt. BMP is needed did give verbal order for BMP, but also informed home care if they can see epic there are orders for labs in Epic. RAMOS Andrade RN

## 2024-08-02 ENCOUNTER — LAB REQUISITION (OUTPATIENT)
Dept: LAB | Facility: CLINIC | Age: 49
End: 2024-08-02
Payer: MEDICARE

## 2024-08-02 DIAGNOSIS — I50.33 ACUTE ON CHRONIC DIASTOLIC (CONGESTIVE) HEART FAILURE (H): ICD-10-CM

## 2024-08-02 LAB
ANION GAP SERPL CALCULATED.3IONS-SCNC: 11 MMOL/L (ref 7–15)
BUN SERPL-MCNC: 21.9 MG/DL (ref 6–20)
CALCIUM SERPL-MCNC: 10 MG/DL (ref 8.8–10.4)
CHLORIDE SERPL-SCNC: 103 MMOL/L (ref 98–107)
CREAT SERPL-MCNC: 0.88 MG/DL (ref 0.51–0.95)
EGFRCR SERPLBLD CKD-EPI 2021: 80 ML/MIN/1.73M2
GLUCOSE SERPL-MCNC: 99 MG/DL (ref 70–99)
HCO3 SERPL-SCNC: 25 MMOL/L (ref 22–29)
POTASSIUM SERPL-SCNC: 3.8 MMOL/L (ref 3.4–5.3)
SODIUM SERPL-SCNC: 139 MMOL/L (ref 135–145)

## 2024-08-02 PROCEDURE — 80048 BASIC METABOLIC PNL TOTAL CA: CPT | Mod: ORL | Performed by: NURSE PRACTITIONER

## 2024-08-05 ENCOUNTER — TELEPHONE (OUTPATIENT)
Dept: FAMILY MEDICINE | Facility: CLINIC | Age: 49
End: 2024-08-05

## 2024-08-05 ENCOUNTER — VIRTUAL VISIT (OUTPATIENT)
Dept: CARDIOLOGY | Facility: CLINIC | Age: 49
End: 2024-08-05
Attending: NURSE PRACTITIONER
Payer: MEDICARE

## 2024-08-05 VITALS — BODY MASS INDEX: 50.64 KG/M2 | WEIGHT: 293 LBS

## 2024-08-05 DIAGNOSIS — E66.01 MORBID OBESITY (H): ICD-10-CM

## 2024-08-05 DIAGNOSIS — G47.33 OSA ON CPAP: ICD-10-CM

## 2024-08-05 DIAGNOSIS — I27.20 PULMONARY HYPERTENSION (H): ICD-10-CM

## 2024-08-05 DIAGNOSIS — I50.32 CHRONIC DIASTOLIC CONGESTIVE HEART FAILURE (H): Primary | ICD-10-CM

## 2024-08-05 DIAGNOSIS — I50.32 CHRONIC HEART FAILURE WITH PRESERVED EJECTION FRACTION (H): ICD-10-CM

## 2024-08-05 PROCEDURE — 99214 OFFICE O/P EST MOD 30 MIN: CPT | Mod: 95 | Performed by: NURSE PRACTITIONER

## 2024-08-05 NOTE — LETTER
8/5/2024    Mikala Luna MD  [id number]  [facility] [address]    RE: Bess Enamorado       Dear Colleague,     I had the pleasure of seeing Bess Enamorado in the [facility].          ~Cardiology VIRTUAL CORE Clinic Visit~      Service Date: August 5, 2024  Primary Cardiologist: Dr. Diaz  Reason for visit: CORE follow up    Ms. Bess Enamorado is a 48 year old female who is being evaluated via a billable video visit.    Patient has given verbal consent for virtual visit?  Yes    History of Present Illness     Bess Enamorado is a wonderfully pleasant 48 year old female with a past medical history notable for diastolic CHF, tachycardia, HFpEF, history of NSTEMI, hypertension, severe pulmonary hypertension, NARCISA on CPAP, morbid obesity with BMI around 56, MS, lymphedema, history of pulmonary embolism, renal insufficiency.     In past, patient used to see Dr. Sanchez at the [facility] for RV failure.  She last saw him in 2022.  She then was referred to [facility] where she met Dr. Diaz for severe RV failure and cor pulmonale.     In summary, patient has history of morbid obesity and uses a wheelchair assistance for mobility.  She has done this for the past many years.  She has sleep apnea and is compliant with CPAP use.  She has been on Bumex for many years.  She has lymphedema in addition to RV failure.     Over the years, patient has had progressive RV dilation and dysfunction on her echocardiogram.  Her PA pressures are close to 70+ RAP, which are also estimated to be very high based on IVC size.  Her LV function is hyperdynamic.  She has mild mitral stenosis.     She was seen in cardiology clinic in April 2024 as a new heart failure visit.  At that time, patient noted some degree of orthopnea, but mainly has severe symptoms of right-sided congestive heart failure.  At that point, she was on 3 mg of Bumex daily.  Patient notes to have an eating disorder of consuming excessive  amounts of calories and food.  She has sought treatment for this in the past; continues to work with a therapist.  She denied angina, syncope or presyncope.  She is a poor functional status.     Since that visit, patient has been hospitalized multiple times.  First from 4/18 - 4/23/2024 with left leg cellulitis.  Then from 4/26 - 5/1/2024 with C. difficile and cellulitis of the abdominal wall.  When she discharged from the hospital on 5/1/2024, her weight was noted to be approximately 304 pounds (vs 330# at previous office visit).  Most recently, she was hospitalized 7/18-7/23 with fever and septic shock due to cellulitis.  Discharge weight 322# on 7/23/24.     Interval 06/11/24     Cardiopulmonary symptoms: She is currently using 3L O2 via NC, she is working on weaning down.  Up until her hospital stay, she was doing well.  She had missed a couple days of medications when she had company, and could feel the weight/swelling increase.  She is now back on her medication regimen and states she is feeling so much better.  Upper thigh swelling is softer, feels her swelling overall is at baseline.  She has diuresed 27# since hospital discharge, feels her current regimen is working quite well.  Functional/ADL status: She is able to do some walking with a walker, primarily wheelchair bound.  Working with therapies.  Balance and stamina have improved.  Her back was able tolerate physical therapy exercise better with weight loss.  Today's home weight: 295#  BMP stable.  Zepbound denied by insurance.   __________________________________________________________________         Assessment and Impression:     Severe right-sided heart failure  Critical pulmonary hypertension, chronic  Severe right-sided vascular congestion and renal congestion causing inability to achieve adequate diuresis.  Dietary noncompliance and morbid obesity.  Limited functional capacity -mobility via wheelchair.  Currently on p.o. Bumex to 4 mg twice daily  (8 AM + 2 PM) + KCl 20 mEq twice daily.  Chronic lymphedema  NARCISA on CPAP  Multiple sclerosis  Per last hospitalization, patient immobile and does not routinely follow with neurology.  Not on any PTA medications for MS.  She was given a written neurology referral and given progressive respiratory failure of the past 3 to 4 months was recommended to follow-up with them to evaluate if there is any component of neuromuscular weakness secondary to MS driving her PH.  Morbid obesity, denied Zepbound.  History of pulmonary embolism  History of NSTEMI, on aspirin 81 mg daily.         Recommendations and Plan:     Continue current regimen as is.  Wean oxygen as able.  Track weights at home; notify clinic of 5# weekly weight gain.  Next labs: 2-months  Follow up with [facility] in 2-months.  __________________________________________________________________    Thank you for the opportunity to participate in this pleasant patient's care.    We would be happy to see this patient sooner for any concerns in the meantime.    JULIETA Anguiano, CNP   Nurse Practitioner  [facility]    Today's clinic visit entailed:  The following tests were independently interpreted by me as noted in my documentation: labs  Ordering of each unique test  Prescription drug management  The level of medical decision making during this visit was of moderate complexity.  Review of the result(s) of each unique test - cardiac testing, cardiac imaging, labs  Care everywhere reviewed for additional records to facilitate comprehensive patient care.    Provider location: office  Patient location: home  Time on video call: 13 minutes, 30 seconds    Orders this Visit:  Orders Placed This Encounter   Procedures     Basic metabolic panel     Follow-Up with Cardiology- Core     No orders of the defined types were placed in this encounter.    There are no discontinued medications.  Encounter Diagnoses   Name Primary?     Chronic heart failure  with preserved ejection fraction (H)      Chronic diastolic congestive heart failure (H) Yes     NARCISA on CPAP      Morbid obesity (H)      Pulmonary hypertension (H)      CURRENT MEDICATIONS:  Current Outpatient Medications   Medication Sig Dispense Refill     acetaminophen (TYLENOL) 650 MG CR tablet Take 650 mg by mouth every 8 hours as needed for mild pain or fever       aspirin (ASPIRIN LOW DOSE) 81 MG EC tablet Take 1 tablet (81 mg) by mouth daily       bumetanide (BUMEX) 2 MG tablet Take 2 tablets (4 mg) by mouth 2 times daily 120 tablet 2     Emollient (GOLD BOND MEDICATED BODY EX) Externally apply 1 Application topically 2 times daily as needed       empagliflozin (JARDIANCE) 10 MG TABS tablet Take 1 tablet (10 mg) by mouth daily 90 tablet 1     linezolid (ZYVOX) 600 MG tablet Take 1 tablet (600 mg) by mouth 2 times daily 23 tablet 0     metoprolol succinate ER (TOPROL XL) 25 MG 24 hr tablet Take 0.5 tablets (12.5 mg) by mouth daily       miconazole (MICATIN) 2 % AERP powder Apply topically 2 times daily 128 g 3     potassium chloride sheila ER (KLOR-CON M20) 20 MEQ CR tablet Take 1 tablet (20 mEq) by mouth daily 30 tablet 0     Pramoxine HCl (VAGISIL ANTI-ITCH MEDICATED EX) Externally apply topically as needed       saccharomyces boulardii (FLORASTOR) 250 MG capsule Take 1 capsule (250 mg) by mouth 2 times daily 60 capsule 0     sertraline (ZOLOFT) 100 MG tablet Take 1 tablet (100 mg) by mouth daily 90 tablet 3     spironolactone (ALDACTONE) 25 MG tablet Take 0.5 tablets (12.5 mg) by mouth daily 15 tablet 0     ALLERGIES     Allergies   Allergen Reactions     Nkda [No Known Drug Allergy]      PAST MEDICAL, SURGICAL, FAMILY, SOCIAL HISTORY:  History was reviewed and updated as needed, see medical record.    Review of Systems:  Skin: negative  Eyes: negative  Ears/Nose/Throat: negative  Respiratory: ROONEY, on supplemental O2  Cardiovascular: negative, palpitations, tachycardia, and chest pain  Gastrointestinal:  negative  Genitourinary: negative  Musculoskeletal: generalized weakness, chronic lymphedema  Neurologic: negative  Psychiatric: negative  Hematologic/Lymphatic/Immunologic: negative  Endocrine: negative    PSYCH: Alert and oriented times 3; coherent speech, normal   rate and volume, able to articulate logical thoughts, able   to abstract reason, no tangential thoughts, no hallucinations   or delusions. her affect is normal  RESP: No cough, no audible wheezing, able to talk in full sentences    Remainder of the comprehensive physical exam is unable to be completed due to today's visit being completed via telephone call.    Wt 133.8 kg (295 lb)   LMP 06/04/2024 (Exact Date)   BMI 50.64 kg/m     Wt Readings from Last 4 Encounters:   08/05/24 133.8 kg (295 lb)   07/19/24 146.5 kg (322 lb 15.6 oz)   06/11/24 134.3 kg (296 lb)   05/01/24 138.2 kg (304 lb 10.8 oz)     Recent Lab Results:  LIPID RESULTS:  Lab Results   Component Value Date    CHOL 93 09/27/2023    CHOL 162 02/11/2016    HDL 28 (L) 09/27/2023    HDL 64 02/11/2016    LDL 42 09/27/2023    LDL 75 02/11/2016    TRIG 116 09/27/2023    TRIG 113 02/11/2016    CHOLHDLRATIO 4.0 11/07/2011     LIVER ENZYME RESULTS:  Lab Results   Component Value Date    AST 16 07/22/2024    AST 18 03/07/2021    ALT 7 07/22/2024    ALT 17 03/07/2021     CBC RESULTS:  Lab Results   Component Value Date    WBC 3.7 (L) 07/23/2024    WBC 7.3 03/07/2021    RBC 4.70 07/23/2024    RBC 4.91 03/07/2021    HGB 13.4 07/23/2024    HGB 12.5 03/07/2021    HCT 42.4 07/23/2024    HCT 42.8 03/07/2021    MCV 90 07/23/2024    MCV 87 03/07/2021    MCH 28.5 07/23/2024    MCH 25.5 (L) 03/07/2021    MCHC 31.6 07/23/2024    MCHC 29.2 (L) 03/07/2021    RDW 18.1 (H) 07/23/2024    RDW 15.6 (H) 03/07/2021     (L) 07/23/2024     03/08/2021     BMP RESULTS:  Lab Results   Component Value Date     08/02/2024     03/07/2021    POTASSIUM 3.8 08/02/2024    POTASSIUM 3.6 09/06/2022     POTASSIUM 4.3 03/07/2021    CHLORIDE 103 08/02/2024    CHLORIDE 98 09/06/2022    CHLORIDE 105 03/07/2021    CO2 25 08/02/2024    CO2 25 09/06/2022    CO2 30 03/07/2021    ANIONGAP 11 08/02/2024    ANIONGAP 11 09/06/2022    ANIONGAP 4 03/07/2021    GLC 99 08/02/2024    GLC 88 07/22/2024    GLC 94 09/06/2022    GLC 99 03/07/2021    BUN 21.9 (H) 08/02/2024    BUN 22 09/06/2022    BUN 12 03/07/2021    CR 0.88 08/02/2024    CR 0.62 03/07/2021    GFRESTIMATED 80 08/02/2024    GFRESTIMATED >90 03/07/2021    GFRESTBLACK >90 03/07/2021    EVITA 10.0 08/02/2024    EVITA 9.4 03/07/2021      A1C RESULTS:  Lab Results   Component Value Date    A1C 5.7 (H) 02/13/2020     INR RESULTS:  Lab Results   Component Value Date    INR 1.35 (H) 04/22/2024    INR 1.23 (H) 02/11/2022    INR 0.96 04/19/2013                       Thank you for allowing me to participate in the care of your patient.      Sincerely,     JULIETA Anguiano CNP     [facility]  cc:   JULIETA Anguiano CNP  [address]  [place] [address]

## 2024-08-05 NOTE — PATIENT INSTRUCTIONS
Thank you for your visit with the [facility].    Today's Summary:    No changes to plan today.  Repeat labs in 2-months.    Follow-up:  Cardiology follow up at [facility]: CORE visit in 2-months.     Cardiology Scheduling~[phone]  Cardiology Clinic RN~[phone] (Tomasa RN, Heather RN; Lauren RN)    It was a pleasure seeing you today.     JULIETA Anguiano, CNP  Certified Nurse Practitioner  [facility] [place]  August 5, 2024  ________________________________________________________

## 2024-08-05 NOTE — PROGRESS NOTES
~Cardiology VIRTUAL CORE Clinic Visit~      Service Date: August 5, 2024  Primary Cardiologist: Dr. Diaz  Reason for visit: CORE follow up    Ms. Bess Enamorado is a 48 year old female who is being evaluated via a billable video visit.    Patient has given verbal consent for virtual visit?  Yes    History of Present Illness     Bess Enamorado is a wonderfully pleasant 48 year old female with a past medical history notable for diastolic CHF, tachycardia, HFpEF, history of NSTEMI, hypertension, severe pulmonary hypertension, NARCISA on CPAP, morbid obesity with BMI around 56, MS, lymphedema, history of pulmonary embolism, renal insufficiency.     In past, patient used to see Dr. Sanchez at the [facility] for RV failure.  She last saw him in 2022.  She then was referred to [facility] where she met Dr. Diaz for severe RV failure and cor pulmonale.     In summary, patient has history of morbid obesity and uses a wheelchair assistance for mobility.  She has done this for the past many years.  She has sleep apnea and is compliant with CPAP use.  She has been on Bumex for many years.  She has lymphedema in addition to RV failure.     Over the years, patient has had progressive RV dilation and dysfunction on her echocardiogram.  Her PA pressures are close to 70+ RAP, which are also estimated to be very high based on IVC size.  Her LV function is hyperdynamic.  She has mild mitral stenosis.     She was seen in cardiology clinic in April 2024 as a new heart failure visit.  At that time, patient noted some degree of orthopnea, but mainly has severe symptoms of right-sided congestive heart failure.  At that point, she was on 3 mg of Bumex daily.  Patient notes to have an eating disorder of consuming excessive amounts of calories and food.  She has sought treatment for this in the past; continues to work with a therapist.  She denied angina, syncope or presyncope.  She is a poor functional status.     Since  that visit, patient has been hospitalized multiple times.  First from 4/18 - 4/23/2024 with left leg cellulitis.  Then from 4/26 - 5/1/2024 with C. difficile and cellulitis of the abdominal wall.  When she discharged from the hospital on 5/1/2024, her weight was noted to be approximately 304 pounds (vs 330# at previous office visit).  Most recently, she was hospitalized 7/18-7/23 with fever and septic shock due to cellulitis.  Discharge weight 322# on 7/23/24.     Interval 06/11/24     Cardiopulmonary symptoms: She is currently using 3L O2 via NC, she is working on weaning down.  Up until her hospital stay, she was doing well.  She had missed a couple days of medications when she had company, and could feel the weight/swelling increase.  She is now back on her medication regimen and states she is feeling so much better.  Upper thigh swelling is softer, feels her swelling overall is at baseline.  She has diuresed 27# since hospital discharge, feels her current regimen is working quite well.  Functional/ADL status: She is able to do some walking with a walker, primarily wheelchair bound.  Working with therapies.  Balance and stamina have improved.  Her back was able tolerate physical therapy exercise better with weight loss.  Today's home weight: 295#  BMP stable.  Zepbound denied by insurance.   __________________________________________________________________         Assessment and Impression:     Severe right-sided heart failure  Critical pulmonary hypertension, chronic  Severe right-sided vascular congestion and renal congestion causing inability to achieve adequate diuresis.  Dietary noncompliance and morbid obesity.  Limited functional capacity -mobility via wheelchair.  Currently on p.o. Bumex to 4 mg twice daily (8 AM + 2 PM) + KCl 20 mEq twice daily.  Chronic lymphedema  NARCISA on CPAP  Multiple sclerosis  Per last hospitalization, patient immobile and does not routinely follow with neurology.  Not on any PTA  medications for MS.  She was given a written neurology referral and given progressive respiratory failure of the past 3 to 4 months was recommended to follow-up with them to evaluate if there is any component of neuromuscular weakness secondary to MS driving her PH.  Morbid obesity, denied Zepbound.  History of pulmonary embolism  History of NSTEMI, on aspirin 81 mg daily.         Recommendations and Plan:     Continue current regimen as is.  Wean oxygen as able.  Track weights at home; notify clinic of 5# weekly weight gain.  Next labs: 2-months  Follow up with [facility] in 2-months.  __________________________________________________________________    Thank you for the opportunity to participate in this pleasant patient's care.    We would be happy to see this patient sooner for any concerns in the meantime.    JULIETA Anguiano, CNP   Nurse Practitioner  [facility]    Today's clinic visit entailed:  The following tests were independently interpreted by me as noted in my documentation: labs  Ordering of each unique test  Prescription drug management  The level of medical decision making during this visit was of moderate complexity.  Review of the result(s) of each unique test - cardiac testing, cardiac imaging, labs  Care everywhere reviewed for additional records to facilitate comprehensive patient care.    Provider location: office  Patient location: home  Time on video call: 13 minutes, 30 seconds    Orders this Visit:  Orders Placed This Encounter   Procedures    Basic metabolic panel    Follow-Up with Cardiology- Core     No orders of the defined types were placed in this encounter.    There are no discontinued medications.  Encounter Diagnoses   Name Primary?    Chronic heart failure with preserved ejection fraction (H)     Chronic diastolic congestive heart failure (H) Yes    NARCISA on CPAP     Morbid obesity (H)     Pulmonary hypertension (H)      CURRENT MEDICATIONS:  Current  Outpatient Medications   Medication Sig Dispense Refill    acetaminophen (TYLENOL) 650 MG CR tablet Take 650 mg by mouth every 8 hours as needed for mild pain or fever      aspirin (ASPIRIN LOW DOSE) 81 MG EC tablet Take 1 tablet (81 mg) by mouth daily      bumetanide (BUMEX) 2 MG tablet Take 2 tablets (4 mg) by mouth 2 times daily 120 tablet 2    Emollient (GOLD BOND MEDICATED BODY EX) Externally apply 1 Application topically 2 times daily as needed      empagliflozin (JARDIANCE) 10 MG TABS tablet Take 1 tablet (10 mg) by mouth daily 90 tablet 1    linezolid (ZYVOX) 600 MG tablet Take 1 tablet (600 mg) by mouth 2 times daily 23 tablet 0    metoprolol succinate ER (TOPROL XL) 25 MG 24 hr tablet Take 0.5 tablets (12.5 mg) by mouth daily      miconazole (MICATIN) 2 % AERP powder Apply topically 2 times daily 128 g 3    potassium chloride sheila ER (KLOR-CON M20) 20 MEQ CR tablet Take 1 tablet (20 mEq) by mouth daily 30 tablet 0    Pramoxine HCl (VAGISIL ANTI-ITCH MEDICATED EX) Externally apply topically as needed      saccharomyces boulardii (FLORASTOR) 250 MG capsule Take 1 capsule (250 mg) by mouth 2 times daily 60 capsule 0    sertraline (ZOLOFT) 100 MG tablet Take 1 tablet (100 mg) by mouth daily 90 tablet 3    spironolactone (ALDACTONE) 25 MG tablet Take 0.5 tablets (12.5 mg) by mouth daily 15 tablet 0     ALLERGIES     Allergies   Allergen Reactions    Nkda [No Known Drug Allergy]      PAST MEDICAL, SURGICAL, FAMILY, SOCIAL HISTORY:  History was reviewed and updated as needed, see medical record.    Review of Systems:  Skin: negative  Eyes: negative  Ears/Nose/Throat: negative  Respiratory: ROONEY, on supplemental O2  Cardiovascular: negative, palpitations, tachycardia, and chest pain  Gastrointestinal: negative  Genitourinary: negative  Musculoskeletal: generalized weakness, chronic lymphedema  Neurologic: negative  Psychiatric: negative  Hematologic/Lymphatic/Immunologic: negative  Endocrine: negative    PSYCH:  Alert and oriented times 3; coherent speech, normal   rate and volume, able to articulate logical thoughts, able   to abstract reason, no tangential thoughts, no hallucinations   or delusions. her affect is normal  RESP: No cough, no audible wheezing, able to talk in full sentences    Remainder of the comprehensive physical exam is unable to be completed due to today's visit being completed via telephone call.    Wt 133.8 kg (295 lb)   LMP 06/04/2024 (Exact Date)   BMI 50.64 kg/m     Wt Readings from Last 4 Encounters:   08/05/24 133.8 kg (295 lb)   07/19/24 146.5 kg (322 lb 15.6 oz)   06/11/24 134.3 kg (296 lb)   05/01/24 138.2 kg (304 lb 10.8 oz)     Recent Lab Results:  LIPID RESULTS:  Lab Results   Component Value Date    CHOL 93 09/27/2023    CHOL 162 02/11/2016    HDL 28 (L) 09/27/2023    HDL 64 02/11/2016    LDL 42 09/27/2023    LDL 75 02/11/2016    TRIG 116 09/27/2023    TRIG 113 02/11/2016    CHOLHDLRATIO 4.0 11/07/2011     LIVER ENZYME RESULTS:  Lab Results   Component Value Date    AST 16 07/22/2024    AST 18 03/07/2021    ALT 7 07/22/2024    ALT 17 03/07/2021     CBC RESULTS:  Lab Results   Component Value Date    WBC 3.7 (L) 07/23/2024    WBC 7.3 03/07/2021    RBC 4.70 07/23/2024    RBC 4.91 03/07/2021    HGB 13.4 07/23/2024    HGB 12.5 03/07/2021    HCT 42.4 07/23/2024    HCT 42.8 03/07/2021    MCV 90 07/23/2024    MCV 87 03/07/2021    MCH 28.5 07/23/2024    MCH 25.5 (L) 03/07/2021    MCHC 31.6 07/23/2024    MCHC 29.2 (L) 03/07/2021    RDW 18.1 (H) 07/23/2024    RDW 15.6 (H) 03/07/2021     (L) 07/23/2024     03/08/2021     BMP RESULTS:  Lab Results   Component Value Date     08/02/2024     03/07/2021    POTASSIUM 3.8 08/02/2024    POTASSIUM 3.6 09/06/2022    POTASSIUM 4.3 03/07/2021    CHLORIDE 103 08/02/2024    CHLORIDE 98 09/06/2022    CHLORIDE 105 03/07/2021    CO2 25 08/02/2024    CO2 25 09/06/2022    CO2 30 03/07/2021    ANIONGAP 11 08/02/2024    ANIONGAP 11 09/06/2022     ANIONGAP 4 03/07/2021    GLC 99 08/02/2024    GLC 88 07/22/2024    GLC 94 09/06/2022    GLC 99 03/07/2021    BUN 21.9 (H) 08/02/2024    BUN 22 09/06/2022    BUN 12 03/07/2021    CR 0.88 08/02/2024    CR 0.62 03/07/2021    GFRESTIMATED 80 08/02/2024    GFRESTIMATED >90 03/07/2021    GFRESTBLACK >90 03/07/2021    EVITA 10.0 08/02/2024    EVITA 9.4 03/07/2021      A1C RESULTS:  Lab Results   Component Value Date    A1C 5.7 (H) 02/13/2020     INR RESULTS:  Lab Results   Component Value Date    INR 1.35 (H) 04/22/2024    INR 1.23 (H) 02/11/2022    INR 0.96 04/19/2013

## 2024-08-05 NOTE — TELEPHONE ENCOUNTER
Called and left detailed message for verbal orders on confidential voice mail and to please send the orders by fax to sign.    Isabella Salinas RN on 8/5/2024 at 5:33 PM

## 2024-08-05 NOTE — TELEPHONE ENCOUNTER
Key from [facility] calling requesting verbal orders for PT Evaluation.    Also needs new weight parameters as she is now down to 295# since taking off the water weight since being home from the hospital. Parameters previously were [phone]#.   Thank you!    Mary Gramajo RN

## 2024-08-05 NOTE — PROGRESS NOTES
"Bess is a 48 year old who is being evaluated via a billable video visit.    How would you like to obtain your AVS? MyChart  If the video visit is dropped, the invitation should be resent by: Send to e-mail at: [email or web address] or  [phone]  Will anyone else be joining your video visit? No  {If patient encounters technical issues they should call [phone] :[id number]}  Video-Visit Details    Type of service:  Video Visit   Video End Time:{video visit start/end time for provider to select:650723}  Originating Location (pt. Location): {video visit patient location:716814::\"Home\"}  {PROVIDER LOCATION On-site should be selected for visits conducted from your clinic location or adjoining [facility] hospital, academic office, or other nearby [facility] building. Off-site should be selected for all other provider locations, including home:[id number]}  Distant Location (provider location):  {virtual location provider:109207}  Platform used for Video Visit: {Virtual Visit Platforms:182474::\"Electric Imp\"}    "

## 2024-08-05 NOTE — TELEPHONE ENCOUNTER
Agree with requested orders    Call if weight gain  >5 lbs in 1 day or weight >305 lbs    Mikala Luna M.D.

## 2024-08-14 ENCOUNTER — TELEPHONE (OUTPATIENT)
Dept: CARDIOLOGY | Facility: CLINIC | Age: 49
End: 2024-08-14
Payer: MEDICARE

## 2024-08-14 NOTE — TELEPHONE ENCOUNTER
[facility] Call Center    Phone Message    May a detailed message be left on voicemail: yes     Reason for Call: Other: Pushpa called because she got an order for Bess to have a BMP drawn for Christin but the order doesn't have a date on it and [facility]pa is wondering if they could draw Bess's BMP on Monday 8/19. Sending to Dr. Diaz with [facility] on leave. Please reach out to Pushpa to discuss. Thank you!     Action Taken: Other: Cardiology    Travel Screening: Not Applicable    Thank you!  [facility]       Date of Service:

## 2024-08-14 NOTE — TELEPHONE ENCOUNTER
Called and notified Pushpa that we want a BMP blood draw in 2 months prior to next visit. So sometime the beginning of Oct. 2024 before next visit on 10/14/24.   [facility] verbalized an understanding and will draw this beginning of Oct before next visit on 10/14/24    Heather Morris RN

## 2024-08-15 NOTE — TELEPHONE ENCOUNTER
Called [facility] to get fax number. Order faxed to Pushpa at fax # [phone]    Heather Morris RN

## 2024-08-15 NOTE — TELEPHONE ENCOUNTER
[facility] Call Center    Phone Message    May a detailed message be left on voicemail: yes     Reason for Call: Other: verenice is calling as the order needs to be signed and then faxes for the bpm, please call to advise, thank you.     Action Taken: Other: cardiology    Travel Screening: Not Applicable       Date of Service:

## 2024-08-21 ENCOUNTER — TELEPHONE (OUTPATIENT)
Dept: FAMILY MEDICINE | Facility: CLINIC | Age: 49
End: 2024-08-21
Payer: MEDICARE

## 2024-08-21 NOTE — TELEPHONE ENCOUNTER
Call from Zack with Home Care transferred to author   Tele: [phone]      Home Care is calling regarding an established patient with [facility].    Requesting orders from: Mikala Luna  Provider is following patient: Yes  Is this a 60-day recertification request?  Yes    Orders Requested    Skilled Nursing  Request for recertification   Frequency:  1x a week x 3 weeks and every other week x 6 weeks       Information was gathered and will be sent to provider for review.  RN will contact Home Care with information after provider review.  Confirmed ok to leave a detailed message with call back.  Contact information confirmed and updated as needed.    Ean Del Valle RN

## 2024-08-21 NOTE — TELEPHONE ENCOUNTER
Called and left detailed message for verbal orders on confidential voice mail and to please send the orders by fax to sign.    Isabella Salinas RN on 8/21/2024 at 11:20 AM

## 2024-09-10 ENCOUNTER — TELEPHONE (OUTPATIENT)
Dept: PULMONOLOGY | Facility: CLINIC | Age: 49
End: 2024-09-10
Payer: MEDICARE

## 2024-09-10 NOTE — TELEPHONE ENCOUNTER
[facility] Call Center    Phone Message    May a detailed message be left on voicemail: yes     Reason for Call: Other: Appt Tomorrow   Patient is requesting a call back from the care team to discuss the upcoming PFT tomorrow. She would also like to know if the clinic provides walkers for pt's to use? Please call back to advise. Thank you.     Action Taken: Other: Pulm    Travel Screening: Not Applicable     Date of Service: 9/10/24

## 2024-09-10 NOTE — TELEPHONE ENCOUNTER
Called and spoke with patient.  Answered her questions. She will be here tomorrow for her PFT and visit with Dr. Chaudhry.

## 2024-09-11 ENCOUNTER — OFFICE VISIT (OUTPATIENT)
Dept: PULMONOLOGY | Facility: CLINIC | Age: 49
End: 2024-09-11
Payer: MEDICARE

## 2024-09-11 VITALS
SYSTOLIC BLOOD PRESSURE: 128 MMHG | BODY MASS INDEX: 50.02 KG/M2 | WEIGHT: 293 LBS | DIASTOLIC BLOOD PRESSURE: 72 MMHG | HEART RATE: 97 BPM | HEIGHT: 64 IN | OXYGEN SATURATION: 92 %

## 2024-09-11 DIAGNOSIS — J96.21 ACUTE AND CHRONIC RESPIRATORY FAILURE WITH HYPOXIA (H): ICD-10-CM

## 2024-09-11 DIAGNOSIS — I27.20 PULMONARY HYPERTENSION (H): ICD-10-CM

## 2024-09-11 LAB — HGB BLD-MCNC: 13 G/DL

## 2024-09-11 PROCEDURE — 85018 HEMOGLOBIN: CPT | Mod: QW | Performed by: INTERNAL MEDICINE

## 2024-09-11 PROCEDURE — 99214 OFFICE O/P EST MOD 30 MIN: CPT | Mod: 25 | Performed by: INTERNAL MEDICINE

## 2024-09-11 PROCEDURE — 94060 EVALUATION OF WHEEZING: CPT | Mod: 26 | Performed by: INTERNAL MEDICINE

## 2024-09-11 PROCEDURE — 99207 PR NO BILLABLE SERVICE THIS VISIT: CPT | Performed by: INTERNAL MEDICINE

## 2024-09-11 PROCEDURE — 94729 DIFFUSING CAPACITY: CPT | Mod: 26 | Performed by: INTERNAL MEDICINE

## 2024-09-11 NOTE — LETTER
9/11/2024      Bess Enamorado  [address] [facility] [address]      Dear Colleague,    Thank you for referring your patient, Bess Enamorado, to the [facility]. Please see a copy of my visit note below.    Pulmonary Clinic Follow-up Visit    Assessment and Plan:   48 year old female never smoker with a history of severe NARCISA with OHS on APAP with oxygen bled in, RLS, severe pulmonary HTN and right heart failure with preserved LVEF, severe obesity, cholecystectomy, depression/anxiety, tiny asymptomatic pulmonary embolism in February 2022 that has since resolved, PCOS, recurrent hospitalizations for sepsis (UTIs, cellulitis, bacteremia) and acute decompensated heart failure, presenting for follow-up.     Severe NARCISA with OHS, severe pulmonary HTN, chronic respiratory failure with hypercapnia and hypoxia: Bess is struggling with severe pulmonary HTN, right heart failure. Has marked anasarca on exam and only taking one of her daily bumetanide doses due to frequent urination and living alone. Takes the 4 mg of bumetanide at midnight so that she can stay awake to urinate at night and not have to worry about urinating frequently during the day if she has company. Cyanotic lips and fingers, SpO2 low 80s on clinica presentation; does not bring her oxygen with her when she goes out. Uses CPAP when sleeping in recliner during the day with oxygen 2.5 L/min bled in; when not reclining/sleeping, uses oxygen 2.5 L/min via NC. Trying to restrict sodium but not always adherent with this restriction. Tiny asymptomatic right-sided pulmonary embolism in early February 2022 in the context of recent COVID-19 infection, not present on CT C/A/P with PE protocol in July 2024; she is on low-dose ASA, not on DOAC. PFT shows restrictive physiology with reduced DLCO c/w pulmonary HTN, NARCISA, obesity. No evidence of airflow obstruction. TTE estimates RVSP 82. Sleep study in August 2017 showed AHI 45.9 with tCO2  increase by 15 mmHg when in supine REM c/w hypoventilation and frequent PLMs (64.2/hr baseline, 89.6/hr on treatment).     Plan:  - patient is close to needing hospitalization, and would be safer with assisted living home environment; overall tenuous status with severe pulmonary HTN, anasarca, cyanosis  - advised her to contact her cardiology clinic; she is taking bumetanide 4 mg at midnight, not taking a second dose as detailed above  - continue APAP 10-16 via oronasal mask when asleep with oxygen 2.5 L/min bled in  - advised restricting sodium to less than 2000 mg daily  - no indication for inhaled therapies  - recommend remaining up to date with respiratory vaccinations  - follow up in 6 months  - encouraged her to contact me with questions or concerning symptoms    Onur Chaudhry MD  Pulmonary and Critical Care Medicine  [facility]  Office [phone]  he/him    CCx: Severe NARCISA with OHS, severe pulmonary HTN, chronic respiratory failure with hypercapnia and hypoxia    HPI: 48 year old female never smoker with a history of severe NARCISA with OHS on APAP with oxygen bled in, RLS, severe pulmonary HTN and right heart failure with preserved LVEF, severe obesity, cholecystectomy, depression/anxiety, tiny asymptomatic pulmonary embolism in February 2022 that has since resolved, PCOS, recurrent hospitalizations for sepsis (UTIs, cellulitis, bacteremia) and acute decompensated heart failure, presenting for follow-up. Bess is struggling with severe pulmonary HTN, right heart failure. Has marked anasarca on exam and only taking one of her daily bumetanide doses due to frequent urination and living alone. Takes the 4 mg of bumetanide at midnight so that she can stay awake to urinate at night and not have to worry about urinating frequently during the day if she has company. Cyanotic lips and fingers, SpO2 low 80s on clinica presentation; does not bring her oxygen with her when she goes out. Uses CPAP when  sleeping in recliner during the day with oxygen 2.5 L/min bled in; when not reclining/sleeping, uses oxygen 2.5 L/min via NC. Trying to restrict sodium but not always adherent with this restriction. Tiny asymptomatic right-sided pulmonary embolism in early February 2022 in the context of recent COVID-19 infection, not present on CT C/A/P with PE protocol in July 2024; she is on low-dose ASA, not on DOAC. PFT shows restrictive physiology with reduced DLCO c/w pulmonary HTN, NARCISA, obesity. No evidence of airflow obstruction. TTE estimates RVSP 82. Sleep study in August 2017 showed AHI 45.9 with tCO2 increase by 15 mmHg when in supine REM c/w hypoventilation and frequent PLMs (64.2/hr baseline, 89.6/hr on treatment).    ROS:  A 12-system review was obtained and was negative with the exception of the symptoms endorsed in the history of present illness.    PMH:  never smoker with a history of severe NARCISA with OHS on APAP with oxygen bled in, RLS, severe pulmonary HTN and right heart failure with preserved LVEF, severe obesity, cholecystectomy, depression/anxiety, tiny asymptomatic pulmonary embolism in February 2022 that has since resolved, PCOS, recurrent hospitalizations for sepsis (UTIs, cellulitis, bacteremia) and acute decompensated heart failure    PSH:  Past Surgical History:   Procedure Laterality Date     CHOLECYSTECTOMY, LAPOROSCOPIC      Cholecystectomy, Laparoscopic     COLONOSCOPY  2007?    Bathroom issue..results normal     COMBINED CYSTOSCOPY, RETROGRADES, EXCHANGE STENT URETER(S) Right 2/21/2022    Procedure: CYSTOSCOPY, WITH right RETROGRADE PYELOGRAM AND right URETERAL STENT PLACEMENT;  Surgeon: Dyole Palomares MD;  Location: [facility] OR     OPEN REDUCTION INTERNAL FIXATION TOE(S)  4/13/2012    Procedure:OPEN REDUCTION INTERNAL FIXATION TOE(S); Open reduction internal fixation right proximal fifth metatarsal fracture-   Anes-choice block; Surgeon:LEY, JEFFREY DUANE; Location:[place] OR     [facility]  LUMEN PLACEMENT  3/20/2024     PICC TRIPLE LUMEN PLACEMENT  2/21/2022            Allergies:  Allergies   Allergen Reactions     Nkda [No Known Drug Allergy]        Family HX:  Family History   Problem Relation Age of Onset     Neurologic Disorder Father         MS     Heart Disease Father         irregular heart rate     Diabetes Father      Melanoma Father      Sleep Apnea Father      Other Cancer Father      Cancer Maternal Grandfather         lung     Other Cancer Maternal Grandfather      Cancer Paternal Grandmother         stomach     Other Cancer Paternal Grandmother      Cancer Mother      Other Cancer Mother      Thyroid Disease Mother      Gynecology Maternal Aunt         PCOS     Hypertension Maternal Grandmother      Anxiety Disorder Maternal Grandmother      Thyroid Disease Maternal Grandmother      Anxiety Disorder Sister        Social Hx:  Social History     Socioeconomic History     Marital status: Single     Spouse name: Not on file     Number of children: Not on file     Years of education: Not on file     Highest education level: Not on file   Occupational History     Employer: [employer]   Tobacco Use     Smoking status: Never     Smokeless tobacco: Never   Vaping Use     Vaping status: Never Used   Substance and Sexual Activity     Alcohol use: Yes     Comment: social     Drug use: No     Sexual activity: Not Currently     Partners: Male     Birth control/protection: Pill   Other Topics Concern     Parent/sibling w/ CABG, MI or angioplasty before 65F 55M? No   Social History Narrative    , 3rd-5th grade     Social Determinants of Health     Financial Resource Strain: Low Risk  (9/25/2023)    Financial Resource Strain      Within the past 12 months, have you or your family members you live with been unable to get utilities (heat, electricity) when it was really needed?: No   Food Insecurity: Low Risk  (3/21/2024)    Food Insecurity      Within the past 12 months, did  you worry that your food would run out before you got money to buy more?: No      Within the past 12 months, did the food you bought just not last and you didn t have money to get more?: No   Transportation Needs: Low Risk  (3/21/2024)    Transportation Needs      Within the past 12 months, has lack of transportation kept you from medical appointments, getting your medicines, non-medical meetings or appointments, work, or from getting things that you need?: No   Physical Activity: Inactive (3/21/2024)    Exercise Vital Sign      Days of Exercise per Week: 0 days      Minutes of Exercise per Session: 0 min   Stress: No Stress Concern Present (12/4/2020)    [employer] [place] [employer] - Occupational Stress Questionnaire      Feeling of Stress : Only a little   Social Connections: Unknown (3/1/2022)    Social Connection and Isolation Panel [NHANES]      Frequency of Communication with Friends and Family: Twice a week      Frequency of Social Gatherings with Friends and Family: Twice a week      Attends Muslim Services: Not on file      Active Member of Clubs or Organizations: Not on file      Attends Club or Organization Meetings: Not on file      Marital Status: Not on file   Interpersonal Safety: Low Risk  (9/27/2023)    Interpersonal Safety      Do you feel physically and emotionally safe where you currently live?: Yes      Within the past 12 months, have you been hit, slapped, kicked or otherwise physically hurt by someone?: No      Within the past 12 months, have you been humiliated or emotionally abused in other ways by your partner or ex-partner?: No   Housing Stability: Low Risk  (3/21/2024)    Housing Stability      Do you have housing? : Yes      Are you worried about losing your housing?: No       Current Meds:  Current Outpatient Medications   Medication Sig Dispense Refill     acetaminophen (TYLENOL) 650 MG CR tablet Take 650 mg by mouth every 8 hours as needed for mild pain or fever        "aspirin (ASPIRIN LOW DOSE) 81 MG EC tablet Take 1 tablet (81 mg) by mouth daily       bumetanide (BUMEX) 2 MG tablet Take 2 tablets (4 mg) by mouth 2 times daily 120 tablet 2     Emollient (GOLD BOND MEDICATED BODY EX) Externally apply 1 Application topically 2 times daily as needed       empagliflozin (JARDIANCE) 10 MG TABS tablet Take 1 tablet (10 mg) by mouth daily 90 tablet 1     linezolid (ZYVOX) 600 MG tablet Take 1 tablet (600 mg) by mouth 2 times daily 23 tablet 0     metoprolol succinate ER (TOPROL XL) 25 MG 24 hr tablet Take 0.5 tablets (12.5 mg) by mouth daily       miconazole (MICATIN) 2 % AERP powder Apply topically 2 times daily 128 g 3     potassium chloride sheila ER (KLOR-CON M20) 20 MEQ CR tablet Take 1 tablet (20 mEq) by mouth daily 30 tablet 0     Pramoxine HCl (VAGISIL ANTI-ITCH MEDICATED EX) Externally apply topically as needed       saccharomyces boulardii (FLORASTOR) 250 MG capsule Take 1 capsule (250 mg) by mouth 2 times daily 60 capsule 0     sertraline (ZOLOFT) 100 MG tablet Take 1 tablet (100 mg) by mouth daily 90 tablet 3     spironolactone (ALDACTONE) 25 MG tablet Take 0.5 tablets (12.5 mg) by mouth daily 15 tablet 0       Physical Exam:  /72 (BP Location: Left arm, Patient Position: Sitting, Cuff Size: Adult Large)   Pulse 97   Ht 1.626 m (5' 4\")   Wt 135.2 kg (298 lb)   SpO2 92%   BMI 51.15 kg/m    Gen: alert, oriented, appears ill, cyanotic lips and fingertips, in wheelchair  HEENT: no cervical or supraclavicular lymphadenopathy  CV: RRR, no M/G/R  Resp: CTAB, diminished d/t obesity  Skin: legs heavily wrapped, skin not examined  Ext: cyanotic lips and fingertips, marked anasarca  Neuro: alert, nonfocal    Labs:  reviewed    Imaging studies:  CT C/A/P with PE protocol (July 2024):  - images directly reviewed, formal interpretation follows:  FINDINGS:  ANGIOGRAM CHEST: Enlargement of the central pulmonary arteries. No evidence for pulmonary embolism. Normal caliber thoracic " aorta without dissection.     LUNGS AND PLEURA: Mosaic attenuation throughout the pulmonary parenchyma. No consolidation, pleural fluid or pneumothorax. Calcified granulomas bilaterally.     MEDIASTINUM/AXILLAE: Cardiac enlargement with biatrial enlargement. No pericardial fluid. No lymphadenopathy.     CORONARY ARTERY CALCIFICATION: None.     HEPATOBILIARY: Hepatomegaly. Diffuse hepatic steatosis. Suggestion of surface contour nodularity right hepatic lobe. Cholecystectomy. No biliary dilatation.     PANCREAS: Normal.     SPLEEN: Normal.     ADRENAL GLANDS: Normal.     KIDNEYS/BLADDER: Symmetric renal enhancement. Nonobstructing 3 mm calculus left upper renal pole. No urinary collecting system dilatation. Mild contrast excretion into the bladder. Bladder decompressed.     BOWEL: No obstruction or inflammatory change. Appendix unremarkable.     LYMPH NODES: Minimal interval increase in size of retroperitoneal lymph nodes in the upper abdomen. For example a lymph node in the periaortic space measures 0.8 cm, previously 0.6 cm. Multiple other lymph nodes also demonstrate a mild increase in size.     VASCULATURE: Normal caliber aorta. Minimal vascular calcification.     PELVIC ORGANS: Unchanged right adnexal dermoid. No surrounding inflammatory change. No free fluid.     MUSCULOSKELETAL: Skin thickening and subcutaneous edema involving the patient's anterior abdominal/pelvic wall. Degenerative hypertrophic changes in the spine.                                                                      IMPRESSION:  1.  No pulmonary embolism. Enlargement of central pulmonary arteries which can be seen with pulmonary arterial hypertension.     2.  Normal caliber aorta without dissection.     3.  Diffuse mosaic attenuation throughout the pulmonary parenchyma. These findings can be seen with air trapping related to reactive airways disease or viral etiologies.     4.  Hepatomegaly with diffuse hepatic steatosis.     5.   Nonobstructing 3 mm calculus left upper renal pole.     6.  Unchanged right adnexal dermoid cyst. No free fluid or surrounding inflammatory change.    TTE (July 2024)  Hyperdynamic left ventricular function.The visual ejection fraction is >70%.  Mildly decreased right ventricular systolic function.The right ventricle is  moderate to severely dilated.  Moderate bi-atrial enlargement.  There is severe mitral annular calcification.There is mild mitral  stenosis.There is mild to moderate (1-2+) tricuspid regurgitation.  Severe pulmonary hypertension- RVSP 82 mm hg +RA.  IVC diameter >2.1 cm collapsing <50% with sniff suggests a high RA pressure  estimated at 15 mmHg or greater.     No obvious vegetations noted within the limits of a transthoracic  echocardiogram.  Can consider INNA if high clinical suspicion of endocarditis.    Pulmonary Function Testing  September 2024  Moderate preserved-ratio impaired spirometry  Unable to transfer to plethysmography burris for lung volume measurement  Mild DLCOc reduction    Time spent on chart and image review, meeting with the patient to obtain history, perform physical exam, discuss test results, diagnostic possibilities, further testing options, treatment plan options, and care coordination: 39 minutes    The longitudinal plan of care for the diagnosis(es)/condition(s) as documented were addressed during this visit. Due to the added complexity in care, I will continue to support Bess in the subsequent management and with ongoing continuity of care.      Again, thank you for allowing me to participate in the care of your patient.        Sincerely,        Onur Chaudhry MD

## 2024-09-11 NOTE — PATIENT INSTRUCTIONS
It was good to see you in clinic today. This is what we discussed:    Continue the bumetanide and spironolactone as directed by Dr. Diaz.  Continue to restrict sodium to less than 2000 mg daily.  Continue the oxygen during the day.  Use the CPAP with oxygen when sleeping.  Your lung function test shows changes related to sleep apnea, ventilation issues and pulmonary hypertension. There is no evidence of asthma.  I will see you in 6 months.  Contact me with questions or concerning symptoms.    Onur Chaudhry MD  Pulmonary and Critical Care Medicine  [facility]  Office [phone]

## 2024-09-11 NOTE — PROGRESS NOTES
Pulmonary Clinic Follow-up Visit    Assessment and Plan:   48 year old female never smoker with a history of severe NARCISA with OHS on APAP with oxygen bled in, RLS, severe pulmonary HTN and right heart failure with preserved LVEF, severe obesity, cholecystectomy, depression/anxiety, tiny asymptomatic pulmonary embolism in February 2022 that has since resolved, PCOS, recurrent hospitalizations for sepsis (UTIs, cellulitis, bacteremia) and acute decompensated heart failure, presenting for follow-up.     Severe NARCISA with OHS, severe pulmonary HTN, chronic respiratory failure with hypercapnia and hypoxia: Bess is struggling with severe pulmonary HTN, right heart failure. Has marked anasarca on exam and only taking one of her daily bumetanide doses due to frequent urination and living alone. Takes the 4 mg of bumetanide at midnight so that she can stay awake to urinate at night and not have to worry about urinating frequently during the day if she has company. Cyanotic lips and fingers, SpO2 low 80s on clinica presentation; does not bring her oxygen with her when she goes out. Uses CPAP when sleeping in recliner during the day with oxygen 2.5 L/min bled in; when not reclining/sleeping, uses oxygen 2.5 L/min via NC. Trying to restrict sodium but not always adherent with this restriction. Tiny asymptomatic right-sided pulmonary embolism in early February 2022 in the context of recent COVID-19 infection, not present on CT C/A/P with PE protocol in July 2024; she is on low-dose ASA, not on DOAC. PFT shows restrictive physiology with reduced DLCO c/w pulmonary HTN, NARCISA, obesity. No evidence of airflow obstruction. TTE estimates RVSP 82. Sleep study in August 2017 showed AHI 45.9 with tCO2 increase by 15 mmHg when in supine REM c/w hypoventilation and frequent PLMs (64.2/hr baseline, 89.6/hr on treatment).     Plan:  - patient is close to needing hospitalization, and would be safer with assisted living home environment; overall  tenuous status with severe pulmonary HTN, anasarca, cyanosis  - advised her to contact her cardiology clinic; she is taking bumetanide 4 mg at midnight, not taking a second dose as detailed above  - continue APAP 10-16 via oronasal mask when asleep with oxygen 2.5 L/min bled in  - advised restricting sodium to less than 2000 mg daily  - no indication for inhaled therapies  - recommend remaining up to date with respiratory vaccinations  - follow up in 6 months  - encouraged her to contact me with questions or concerning symptoms    Onur Chaudhry MD  Pulmonary and Critical Care Medicine  [facility]  Office [phone]  he/him    CCx: Severe NARCISA with OHS, severe pulmonary HTN, chronic respiratory failure with hypercapnia and hypoxia    HPI: 48 year old female never smoker with a history of severe NARCISA with OHS on APAP with oxygen bled in, RLS, severe pulmonary HTN and right heart failure with preserved LVEF, severe obesity, cholecystectomy, depression/anxiety, tiny asymptomatic pulmonary embolism in February 2022 that has since resolved, PCOS, recurrent hospitalizations for sepsis (UTIs, cellulitis, bacteremia) and acute decompensated heart failure, presenting for follow-up. Bess is struggling with severe pulmonary HTN, right heart failure. Has marked anasarca on exam and only taking one of her daily bumetanide doses due to frequent urination and living alone. Takes the 4 mg of bumetanide at midnight so that she can stay awake to urinate at night and not have to worry about urinating frequently during the day if she has company. Cyanotic lips and fingers, SpO2 low 80s on clinica presentation; does not bring her oxygen with her when she goes out. Uses CPAP when sleeping in recliner during the day with oxygen 2.5 L/min bled in; when not reclining/sleeping, uses oxygen 2.5 L/min via NC. Trying to restrict sodium but not always adherent with this restriction. Tiny asymptomatic right-sided pulmonary  embolism in early February 2022 in the context of recent COVID-19 infection, not present on CT C/A/P with PE protocol in July 2024; she is on low-dose ASA, not on DOAC. PFT shows restrictive physiology with reduced DLCO c/w pulmonary HTN, NARCISA, obesity. No evidence of airflow obstruction. TTE estimates RVSP 82. Sleep study in August 2017 showed AHI 45.9 with tCO2 increase by 15 mmHg when in supine REM c/w hypoventilation and frequent PLMs (64.2/hr baseline, 89.6/hr on treatment).    ROS:  A 12-system review was obtained and was negative with the exception of the symptoms endorsed in the history of present illness.    PMH:  never smoker with a history of severe NARCISA with OHS on APAP with oxygen bled in, RLS, severe pulmonary HTN and right heart failure with preserved LVEF, severe obesity, cholecystectomy, depression/anxiety, tiny asymptomatic pulmonary embolism in February 2022 that has since resolved, PCOS, recurrent hospitalizations for sepsis (UTIs, cellulitis, bacteremia) and acute decompensated heart failure    PSH:  Past Surgical History:   Procedure Laterality Date    CHOLECYSTECTOMY, LAPOROSCOPIC      Cholecystectomy, Laparoscopic    COLONOSCOPY  2007?    Bathroom issue..results normal    COMBINED CYSTOSCOPY, RETROGRADES, EXCHANGE STENT URETER(S) Right 2/21/2022    Procedure: CYSTOSCOPY, WITH right RETROGRADE PYELOGRAM AND right URETERAL STENT PLACEMENT;  Surgeon: Doyle Palomares MD;  Location: [facility] OR    OPEN REDUCTION INTERNAL FIXATION TOE(S)  4/13/2012    Procedure:OPEN REDUCTION INTERNAL FIXATION TOE(S); Open reduction internal fixation right proximal fifth metatarsal fracture-   Anes-choice block; Surgeon:LEY, JEFFREY DUANE; Location:[facility] OR    PICC SINGLE LUMEN PLACEMENT  3/20/2024    PICC TRIPLE LUMEN PLACEMENT  2/21/2022            Allergies:  Allergies   Allergen Reactions    Nkda [No Known Drug Allergy]        Family HX:  Family History   Problem Relation Age of Onset    Neurologic Disorder  Father         MS    Heart Disease Father         irregular heart rate    Diabetes Father     Melanoma Father     Sleep Apnea Father     Other Cancer Father     Cancer Maternal Grandfather         lung    Other Cancer Maternal Grandfather     Cancer Paternal Grandmother         stomach    Other Cancer Paternal Grandmother     Cancer Mother     Other Cancer Mother     Thyroid Disease Mother     Gynecology Maternal Aunt         PCOS    Hypertension Maternal Grandmother     Anxiety Disorder Maternal Grandmother     Thyroid Disease Maternal Grandmother     Anxiety Disorder Sister        Social Hx:  Social History     Socioeconomic History    Marital status: Single     Spouse name: Not on file    Number of children: Not on file    Years of education: Not on file    Highest education level: Not on file   Occupational History     Employer: [employer]   Tobacco Use    Smoking status: Never    Smokeless tobacco: Never   Vaping Use    Vaping status: Never Used   Substance and Sexual Activity    Alcohol use: Yes     Comment: social    Drug use: No    Sexual activity: Not Currently     Partners: Male     Birth control/protection: Pill   Other Topics Concern    Parent/sibling w/ CABG, MI or angioplasty before 65F 55M? No   Social History Narrative    , 3rd-5th grade     Social Determinants of Health     Financial Resource Strain: Low Risk  (9/25/2023)    Financial Resource Strain     Within the past 12 months, have you or your family members you live with been unable to get utilities (heat, electricity) when it was really needed?: No   Food Insecurity: Low Risk  (3/21/2024)    Food Insecurity     Within the past 12 months, did you worry that your food would run out before you got money to buy more?: No     Within the past 12 months, did the food you bought just not last and you didn t have money to get more?: No   Transportation Needs: Low Risk  (3/21/2024)    Transportation Needs     Within the  past 12 months, has lack of transportation kept you from medical appointments, getting your medicines, non-medical meetings or appointments, work, or from getting things that you need?: No   Physical Activity: Inactive (3/21/2024)    Exercise Vital Sign     Days of Exercise per Week: 0 days     Minutes of Exercise per Session: 0 min   Stress: No Stress Concern Present (12/4/2020)    [place] [employer] - Occupational Stress Questionnaire     Feeling of Stress : Only a little   Social Connections: Unknown (3/1/2022)    Social Connection and Isolation Panel [NHANES]     Frequency of Communication with Friends and Family: Twice a week     Frequency of Social Gatherings with Friends and Family: Twice a week     Attends Sabianism Services: Not on file     Active Member of Clubs or Organizations: Not on file     Attends Club or Organization Meetings: Not on file     Marital Status: Not on file   Interpersonal Safety: Low Risk  (9/27/2023)    Interpersonal Safety     Do you feel physically and emotionally safe where you currently live?: Yes     Within the past 12 months, have you been hit, slapped, kicked or otherwise physically hurt by someone?: No     Within the past 12 months, have you been humiliated or emotionally abused in other ways by your partner or ex-partner?: No   Housing Stability: Low Risk  (3/21/2024)    Housing Stability     Do you have housing? : Yes     Are you worried about losing your housing?: No       Current Meds:  Current Outpatient Medications   Medication Sig Dispense Refill    acetaminophen (TYLENOL) 650 MG CR tablet Take 650 mg by mouth every 8 hours as needed for mild pain or fever      aspirin (ASPIRIN LOW DOSE) 81 MG EC tablet Take 1 tablet (81 mg) by mouth daily      bumetanide (BUMEX) 2 MG tablet Take 2 tablets (4 mg) by mouth 2 times daily 120 tablet 2    Emollient (GOLD BOND MEDICATED BODY EX) Externally apply 1 Application topically 2 times daily as needed       "empagliflozin (JARDIANCE) 10 MG TABS tablet Take 1 tablet (10 mg) by mouth daily 90 tablet 1    linezolid (ZYVOX) 600 MG tablet Take 1 tablet (600 mg) by mouth 2 times daily 23 tablet 0    metoprolol succinate ER (TOPROL XL) 25 MG 24 hr tablet Take 0.5 tablets (12.5 mg) by mouth daily      miconazole (MICATIN) 2 % AERP powder Apply topically 2 times daily 128 g 3    potassium chloride sheila ER (KLOR-CON M20) 20 MEQ CR tablet Take 1 tablet (20 mEq) by mouth daily 30 tablet 0    Pramoxine HCl (VAGISIL ANTI-ITCH MEDICATED EX) Externally apply topically as needed      saccharomyces boulardii (FLORASTOR) 250 MG capsule Take 1 capsule (250 mg) by mouth 2 times daily 60 capsule 0    sertraline (ZOLOFT) 100 MG tablet Take 1 tablet (100 mg) by mouth daily 90 tablet 3    spironolactone (ALDACTONE) 25 MG tablet Take 0.5 tablets (12.5 mg) by mouth daily 15 tablet 0       Physical Exam:  /72 (BP Location: Left arm, Patient Position: Sitting, Cuff Size: Adult Large)   Pulse 97   Ht 1.626 m (5' 4\")   Wt 135.2 kg (298 lb)   SpO2 92%   BMI 51.15 kg/m    Gen: alert, oriented, appears ill, cyanotic lips and fingertips, in wheelchair  HEENT: no cervical or supraclavicular lymphadenopathy  CV: RRR, no M/G/R  Resp: CTAB, diminished d/t obesity  Skin: legs heavily wrapped, skin not examined  Ext: cyanotic lips and fingertips, marked anasarca  Neuro: alert, nonfocal    Labs:  reviewed    Imaging studies:  CT C/A/P with PE protocol (July 2024):  - images directly reviewed, formal interpretation follows:  FINDINGS:  ANGIOGRAM CHEST: Enlargement of the central pulmonary arteries. No evidence for pulmonary embolism. Normal caliber thoracic aorta without dissection.     LUNGS AND PLEURA: Mosaic attenuation throughout the pulmonary parenchyma. No consolidation, pleural fluid or pneumothorax. Calcified granulomas bilaterally.     MEDIASTINUM/AXILLAE: Cardiac enlargement with biatrial enlargement. No pericardial fluid. No " lymphadenopathy.     CORONARY ARTERY CALCIFICATION: None.     HEPATOBILIARY: Hepatomegaly. Diffuse hepatic steatosis. Suggestion of surface contour nodularity right hepatic lobe. Cholecystectomy. No biliary dilatation.     PANCREAS: Normal.     SPLEEN: Normal.     ADRENAL GLANDS: Normal.     KIDNEYS/BLADDER: Symmetric renal enhancement. Nonobstructing 3 mm calculus left upper renal pole. No urinary collecting system dilatation. Mild contrast excretion into the bladder. Bladder decompressed.     BOWEL: No obstruction or inflammatory change. Appendix unremarkable.     LYMPH NODES: Minimal interval increase in size of retroperitoneal lymph nodes in the upper abdomen. For example a lymph node in the periaortic space measures 0.8 cm, previously 0.6 cm. Multiple other lymph nodes also demonstrate a mild increase in size.     VASCULATURE: Normal caliber aorta. Minimal vascular calcification.     PELVIC ORGANS: Unchanged right adnexal dermoid. No surrounding inflammatory change. No free fluid.     MUSCULOSKELETAL: Skin thickening and subcutaneous edema involving the patient's anterior abdominal/pelvic wall. Degenerative hypertrophic changes in the spine.                                                                      IMPRESSION:  1.  No pulmonary embolism. Enlargement of central pulmonary arteries which can be seen with pulmonary arterial hypertension.     2.  Normal caliber aorta without dissection.     3.  Diffuse mosaic attenuation throughout the pulmonary parenchyma. These findings can be seen with air trapping related to reactive airways disease or viral etiologies.     4.  Hepatomegaly with diffuse hepatic steatosis.     5.  Nonobstructing 3 mm calculus left upper renal pole.     6.  Unchanged right adnexal dermoid cyst. No free fluid or surrounding inflammatory change.    TTE (July 2024)  Hyperdynamic left ventricular function.The visual ejection fraction is >70%.  Mildly decreased right ventricular systolic  function.The right ventricle is  moderate to severely dilated.  Moderate bi-atrial enlargement.  There is severe mitral annular calcification.There is mild mitral  stenosis.There is mild to moderate (1-2+) tricuspid regurgitation.  Severe pulmonary hypertension- RVSP 82 mm hg +RA.  IVC diameter >2.1 cm collapsing <50% with sniff suggests a high RA pressure  estimated at 15 mmHg or greater.     No obvious vegetations noted within the limits of a transthoracic  echocardiogram.  Can consider INNA if high clinical suspicion of endocarditis.    Pulmonary Function Testing  September 2024  Moderate preserved-ratio impaired spirometry  Unable to transfer to plethysmography burris for lung volume measurement  Mild DLCOc reduction    Time spent on chart and image review, meeting with the patient to obtain history, perform physical exam, discuss test results, diagnostic possibilities, further testing options, treatment plan options, and care coordination: 39 minutes    The longitudinal plan of care for the diagnosis(es)/condition(s) as documented were addressed during this visit. Due to the added complexity in care, I will continue to support Bess in the subsequent management and with ongoing continuity of care.

## 2024-09-15 LAB
DLCOCOR-%PRED-PRE: 76 %
DLCOCOR-PRE: 15.83 ML/MIN/MMHG
DLCOUNC-%PRED-PRE: 75 %
DLCOUNC-PRE: 15.63 ML/MIN/MMHG
DLCOUNC-PRED: 20.64 ML/MIN/MMHG
ERV-%PRED-PRE: 19 %
ERV-PRE: 0.22 L
ERV-PRED: 1.17 L
EXPTIME-PRE: 5.09 SEC
FEF2575-%PRED-POST: 57 %
FEF2575-%PRED-PRE: 89 %
FEF2575-POST: 1.5 L/SEC
FEF2575-PRE: 2.33 L/SEC
FEF2575-PRED: 2.6 L/SEC
FEFMAX-%PRED-PRE: 80 %
FEFMAX-PRE: 5.4 L/SEC
FEFMAX-PRED: 6.74 L/SEC
FEV1-%PRED-PRE: 64 %
FEV1-PRE: 1.67 L
FEV1FEV6-PRE: 85 %
FEV1FEV6-PRED: 82 %
FEV1FVC-PRE: 85 %
FEV1FVC-PRED: 81 %
FEV1SVC-PRE: 81 %
FEV1SVC-PRED: 76 %
FIFMAX-PRE: 3.15 L/SEC
FVC-%PRED-PRE: 61 %
FVC-PRE: 1.97 L
FVC-PRED: 3.18 L
IC-%PRED-PRE: 78 %
IC-PRE: 1.83 L
IC-PRED: 2.34 L
VA-%PRED-PRE: 67 %
VA-PRE: 3.26 L
VC-%PRED-PRE: 60 %
VC-PRE: 2.05 L
VC-PRED: 3.38 L

## 2024-09-25 ENCOUNTER — TELEPHONE (OUTPATIENT)
Dept: FAMILY MEDICINE | Facility: CLINIC | Age: 49
End: 2024-09-25
Payer: MEDICARE

## 2024-09-25 NOTE — TELEPHONE ENCOUNTER
During Barbara's home care visit today, Bess notified her she stopped taking sertraline on 09/16/24 because it caused diarrhea. The diarrhea did resolve after stopping. Bess told Barbara she stopped it in the past and her stools firmed up so then she restarted it. She has determined the sertraline is the cause of the diarrhea and is wondering if there is another med she can take. Advised to schedule a virtual visit to discuss. Will send mental health screenings via my chart to update.   Barbara will notify Bess to schedule the appt. Sertraline discontinued from her med list   Marce MCCALLUM RN

## 2024-10-01 NOTE — TELEPHONE ENCOUNTER
Left message on identified voicemail confirming that Bess should be seen to discuss medication options and to please update her mental health screenings sent via my chart on 09/25/24.   Marce MCCALLUM RN

## 2024-10-02 LAB — GLUCOSE BLDC GLUCOMTR-MCNC: 48 MG/DL (ref 70–99)

## 2024-10-09 ENCOUNTER — LAB REQUISITION (OUTPATIENT)
Dept: LAB | Facility: CLINIC | Age: 49
End: 2024-10-09
Payer: MEDICARE

## 2024-10-09 DIAGNOSIS — I11.0 HYPERTENSIVE HEART DISEASE WITH HEART FAILURE (H): ICD-10-CM

## 2024-10-09 DIAGNOSIS — I50.33 ACUTE ON CHRONIC DIASTOLIC (CONGESTIVE) HEART FAILURE (H): ICD-10-CM

## 2024-10-09 LAB
ANION GAP SERPL CALCULATED.3IONS-SCNC: 10 MMOL/L (ref 7–15)
BUN SERPL-MCNC: 19.6 MG/DL (ref 6–20)
CALCIUM SERPL-MCNC: 10.1 MG/DL (ref 8.8–10.4)
CHLORIDE SERPL-SCNC: 101 MMOL/L (ref 98–107)
CREAT SERPL-MCNC: 0.78 MG/DL (ref 0.51–0.95)
EGFRCR SERPLBLD CKD-EPI 2021: >90 ML/MIN/1.73M2
GLUCOSE SERPL-MCNC: 109 MG/DL (ref 70–99)
HCO3 SERPL-SCNC: 27 MMOL/L (ref 22–29)
POTASSIUM SERPL-SCNC: 3.5 MMOL/L (ref 3.4–5.3)
SODIUM SERPL-SCNC: 138 MMOL/L (ref 135–145)

## 2024-10-09 PROCEDURE — 80048 BASIC METABOLIC PNL TOTAL CA: CPT | Mod: ORL | Performed by: NURSE PRACTITIONER

## 2024-10-14 ENCOUNTER — VIRTUAL VISIT (OUTPATIENT)
Dept: CARDIOLOGY | Facility: CLINIC | Age: 49
End: 2024-10-14
Attending: NURSE PRACTITIONER
Payer: MEDICARE

## 2024-10-14 DIAGNOSIS — G47.33 OSA ON CPAP: ICD-10-CM

## 2024-10-14 DIAGNOSIS — I10 BENIGN ESSENTIAL HYPERTENSION: ICD-10-CM

## 2024-10-14 DIAGNOSIS — I50.812 CHRONIC RIGHT-SIDED HEART FAILURE (H): ICD-10-CM

## 2024-10-14 DIAGNOSIS — I27.20 PULMONARY HYPERTENSION (H): ICD-10-CM

## 2024-10-14 DIAGNOSIS — I50.32 CHRONIC HEART FAILURE WITH PRESERVED EJECTION FRACTION (H): Primary | ICD-10-CM

## 2024-10-14 DIAGNOSIS — E66.2 OBESITY HYPOVENTILATION SYNDROME (H): ICD-10-CM

## 2024-10-14 PROCEDURE — 99213 OFFICE O/P EST LOW 20 MIN: CPT | Mod: 95 | Performed by: NURSE PRACTITIONER

## 2024-10-14 NOTE — PATIENT INSTRUCTIONS
If you have questions or concerns please call the [facility] nurse team at [phone] or send a [employer] message (Mon-Fri 8am-4pm).  If you have concerns after hours, please call [phone], option 2 to speak with an on call Cardiologist.    Medication changes and/or recommendations from today:   - No medication changes.  - Continue to check your weight as able to monitor for fluid retention.  - Try to stick to a low sodium diet (under 2,000 mg/day).  - Call the [facility] nurse team at the number listed above if you develop signs concerning for fluid retention, including weight gain of over 2 pounds in 1 day or over 5 pounds in 1 week, increasing shortness of breath, or increasing swelling in the legs or abdomen.    Follow up plan:   - Follow up with SELENA Waller in the clinic in about 3 months with labs beforehand.    It was a pleasure seeing you today!     JULIETA Parks, CNP  Nurse Practitioner  [facility]    Thank you for your visit with the [facility] today. [facility] stands for [facility].    The [facility] is a heart failure specialty clinic within the [facility] where you will work with specialized nurse practitioners, physician assistants, doctors, and registered nurses. They are dedicated to helping patients with heart failure to carefully adjust medications, receive education, and learn who and when to call if symptoms develop. They specialize in helping you better understand your condition, slow the progression of your disease, improve the length and quality of your life, help you detect future heart problems before they become life threatening, and avoid hospitalizations.

## 2024-10-14 NOTE — LETTER
10/14/2024    Mikala Luna MD  [address]  [facility] [address]    RE: Bess Enamorado       Dear Colleague,     I had the pleasure of seeing Bess Enamorado in the [facility].    Cardiology Video Visit Progress Note    Service Date: October 14, 2024  Primary Cardiology Team: Dr. Diaz and Christin Holt, APRN, CNP    Ms. Bess Enamorado is a 48 year old female who is being evaluated via a billable video visit.    I had the pleasure of speaking with Ms. Bess Enamorado via video visit today. She is a very pleasant 48 year old female with a past medical history of hypertension, history of pulmonary embolism, severe pulmonary hypertension, NARCISA on CPAP, morbid obesity with BMI around 56, chronic HFpEF with primarily right-sided heart failure and RV dysfunction, multiple sclerosis, lymphedema, and binge eating disorder.     Over the years, patient has had progressive RV dilation and dysfunction on her echocardiograms. Her most recent echocardiogram in July of this year showed severe pulmonary hypertension with RVSP 82 mm hg +RA, and IVC diameter >2.1 cm collapsing <50% with sniff suggesting a high RA pressure  estimated at 15 mmHg or greater. Her LV function is hyperdynamic.  She has mild mitral stenosis.     The patient last met with my colleague, Christin Holt, NP, via virtual visit in August of this year.  She was doing okay from a cardiac standpoint at that time and had been working with physical therapy feeling that her mobility and stamina had been improving slightly.  GLP-1 agonist have been discussed to help with weight loss, but was denied by insurance.  She has been on regimen of Bumex 1 mg twice daily with spironolactone 12.5 mg daily and potassium supplement 20 mEq once daily.  She is also on Jardiance 10 mg once daily.    Today, I connected with Bess via video visit for a routine 2-month check-in. She tells me that she has been feeling okay from a cardiac standpoint. She remains  on oxygen most of the day at 2 L and has been using her CPAP consistently for management of sleep apnea. She states she struggles with checking her weights consistently with her history of binge eating disorder, but tries to do this at least once a week. This has been stable around 295-300 lbs over the past few months. She is primarily wheelchair bound, but does some walking with a walker at home as well. She states her energy level and strength has been improved recently and she has been able to do some light activity like cooking and chores around the house. She feels her shortness of breath has been stable if not improved of late. She has not noticed worsening lower extremity edema, orthopnea, chest pain, or palpitations.    ASSESSMENT:  Severe right-sided heart failure  Severe pulmonary hypertension  Chronic lymphedema  NARCISA on CPAP  Multiple sclerosis  Morbid obesity  History of pulmonary embolism    PLAN:  - Continue current regimen.  - Counseled on continuing to try to check weights as able, provided info on sticking to a low sodium diet, and reviewed signs/symptoms of volume overload to monitor for and when to contact the clinic.   - Recommend follow up with Christin Holt NP in the [facility] in about 3 months with labs beforehand for a BMP and NT pro BNP level.     Video-Visit Details  Total time of Video: 13 minutes    25 total minutes was spent today including chart review, precharting, history and exam via video call, post visit documentation, and reviewing studies as outlined above.     Originating Location (pt. Location): Home  Distant Location (provider location): [place] or [facility]    Platform used for Video Visit: Moisés    Thank you for the opportunity to participate in this pleasant patient's care. We would be happy to see her sooner if needed for any concerns in the meantime.     Please kindly note that this document was completed in part using Dragon  voice recognition software. Although reviewed after completion, some word substitutions and typographical errors may occur. Please contact me if clarification is needed.     JULIETA Parks, CNP   Nurse Practitioner  [facility]    Orders this Visit:  Orders Placed This Encounter   Procedures     Basic metabolic panel     N terminal pro BNP outpatient     Follow-Up with Cardiology- [facility]     No orders of the defined types were placed in this encounter.    There are no discontinued medications.  Encounter Diagnoses   Name Primary?     Chronic heart failure with preserved ejection fraction (H)      Chronic right-sided heart failure (H) Yes     CURRENT MEDICATIONS:  Current Outpatient Medications   Medication Sig Dispense Refill     acetaminophen (TYLENOL) 650 MG CR tablet Take 650 mg by mouth every 8 hours as needed for mild pain or fever       aspirin (ASPIRIN LOW DOSE) 81 MG EC tablet Take 1 tablet (81 mg) by mouth daily       bumetanide (BUMEX) 2 MG tablet Take 2 tablets (4 mg) by mouth 2 times daily 120 tablet 2     Emollient (GOLD BOND MEDICATED BODY EX) Externally apply 1 Application topically 2 times daily as needed       empagliflozin (JARDIANCE) 10 MG TABS tablet Take 1 tablet (10 mg) by mouth daily 90 tablet 1     linezolid (ZYVOX) 600 MG tablet Take 1 tablet (600 mg) by mouth 2 times daily 23 tablet 0     metoprolol succinate ER (TOPROL XL) 25 MG 24 hr tablet Take 0.5 tablets (12.5 mg) by mouth daily       miconazole (MICATIN) 2 % AERP powder Apply topically 2 times daily 128 g 3     potassium chloride sheila ER (KLOR-CON M20) 20 MEQ CR tablet Take 1 tablet (20 mEq) by mouth daily 30 tablet 0     Pramoxine HCl (VAGISIL ANTI-ITCH MEDICATED EX) Externally apply topically as needed       saccharomyces boulardii (FLORASTOR) 250 MG capsule Take 1 capsule (250 mg) by mouth 2 times daily 60 capsule 0     spironolactone (ALDACTONE) 25 MG tablet Take 0.5 tablets (12.5 mg) by mouth daily 15 tablet 0      ALLERGIES  Allergies   Allergen Reactions     Nkda [No Known Drug Allergy]      PAST MEDICAL, SURGICAL, FAMILY HISTORY:  History was reviewed and updated as needed, see medical record.    SOCIAL HISTORY:  Social History     Socioeconomic History     Marital status: Single     Spouse name: Not on file     Number of children: Not on file     Years of education: Not on file     Highest education level: Not on file   Occupational History     Employer: [employer]   Tobacco Use     Smoking status: Never     Smokeless tobacco: Never   Vaping Use     Vaping status: Never Used   Substance and Sexual Activity     Alcohol use: Yes     Comment: social     Drug use: No     Sexual activity: Not Currently     Partners: Male     Birth control/protection: Pill   Other Topics Concern     Parent/sibling w/ CABG, MI or angioplasty before 65F 55M? No   Social History Narrative    , 3rd-5th grade     Social Determinants of Health     Financial Resource Strain: Low Risk  (9/25/2023)    Financial Resource Strain      Within the past 12 months, have you or your family members you live with been unable to get utilities (heat, electricity) when it was really needed?: No   Food Insecurity: Low Risk  (3/21/2024)    Food Insecurity      Within the past 12 months, did you worry that your food would run out before you got money to buy more?: No      Within the past 12 months, did the food you bought just not last and you didn t have money to get more?: No   Transportation Needs: Low Risk  (3/21/2024)    Transportation Needs      Within the past 12 months, has lack of transportation kept you from medical appointments, getting your medicines, non-medical meetings or appointments, work, or from getting things that you need?: No   Physical Activity: Inactive (3/21/2024)    Exercise Vital Sign      Days of Exercise per Week: 0 days      Minutes of Exercise per Session: 0 min   Stress: No Stress Concern Present  (12/4/2020)    [employer] [place] [employer] - Occupational Stress Questionnaire      Feeling of Stress : Only a little   Social Connections: Unknown (3/1/2022)    Social Connection and Isolation Panel [NHANES]      Frequency of Communication with Friends and Family: Twice a week      Frequency of Social Gatherings with Friends and Family: Twice a week      Attends Yazidi Services: Not on file      Active Member of Clubs or Organizations: Not on file      Attends Club or Organization Meetings: Not on file      Marital Status: Not on file   Interpersonal Safety: Low Risk  (9/27/2023)    Interpersonal Safety      Do you feel physically and emotionally safe where you currently live?: Yes      Within the past 12 months, have you been hit, slapped, kicked or otherwise physically hurt by someone?: No      Within the past 12 months, have you been humiliated or emotionally abused in other ways by your partner or ex-partner?: No   Housing Stability: Low Risk  (3/21/2024)    Housing Stability      Do you have housing? : Yes      Are you worried about losing your housing?: No     Review of Systems:  Focused cardiovascular and respiratory review of systems is negative other than the symptoms noted above in the HPI.     PHYSICAL EXAM:  Patient Reported Vitals:  BP: Not reported  Pulse: Not reported  Weight: Not reported    There were no vitals taken for this visit.   Wt Readings from Last 4 Encounters:   09/11/24 135.2 kg (298 lb)   08/05/24 133.8 kg (295 lb)   07/19/24 146.5 kg (322 lb 15.6 oz)   06/11/24 134.3 kg (296 lb)     General Appearance: Appears comfortable in no acute distress.  Respiratory: Breathing non-labored. No audible wheezing. No cough. On oxygen by nasal cannula.   Cardiovascular: Appears well perfused.  Neurologic: No evidence of focal defect.  Psychiatric: Appropriate affect.    The rest of the comprehensive physical examination is unable be performed due to this visit being completed via  video call.    Recent Lab Results:  LIPID RESULTS:  Lab Results   Component Value Date    CHOL 93 09/27/2023    CHOL 162 02/11/2016    HDL 28 (L) 09/27/2023    HDL 64 02/11/2016    LDL 42 09/27/2023    LDL 75 02/11/2016    TRIG 116 09/27/2023    TRIG 113 02/11/2016    CHOLHDLRATIO 4.0 11/07/2011     LIVER ENZYME RESULTS:  Lab Results   Component Value Date    AST 16 07/22/2024    AST 18 03/07/2021    ALT 7 07/22/2024    ALT 17 03/07/2021     CBC RESULTS:  Lab Results   Component Value Date    WBC 3.7 (L) 07/23/2024    WBC 7.3 03/07/2021    RBC 4.70 07/23/2024    RBC 4.91 03/07/2021    HGB 13.0 09/11/2024    HGB 13.4 07/23/2024    HGB 12.5 03/07/2021    HCT 42.4 07/23/2024    HCT 42.8 03/07/2021    MCV 90 07/23/2024    MCV 87 03/07/2021    MCH 28.5 07/23/2024    MCH 25.5 (L) 03/07/2021    MCHC 31.6 07/23/2024    MCHC 29.2 (L) 03/07/2021    RDW 18.1 (H) 07/23/2024    RDW 15.6 (H) 03/07/2021     (L) 07/23/2024     03/08/2021     BMP RESULTS:  Lab Results   Component Value Date     10/09/2024     03/07/2021    POTASSIUM 3.5 10/09/2024    POTASSIUM 3.6 09/06/2022    POTASSIUM 4.3 03/07/2021    CHLORIDE 101 10/09/2024    CHLORIDE 98 09/06/2022    CHLORIDE 105 03/07/2021    CO2 27 10/09/2024    CO2 25 09/06/2022    CO2 30 03/07/2021    ANIONGAP 10 10/09/2024    ANIONGAP 11 09/06/2022    ANIONGAP 4 03/07/2021     (H) 10/09/2024    GLC 88 07/22/2024    GLC 94 09/06/2022    GLC 99 03/07/2021    BUN 19.6 10/09/2024    BUN 22 09/06/2022    BUN 12 03/07/2021    CR 0.78 10/09/2024    CR 0.62 03/07/2021    GFRESTIMATED >90 10/09/2024    GFRESTIMATED >90 03/07/2021    GFRESTBLACK >90 03/07/2021    EVITA 10.1 10/09/2024    EVITA 9.4 03/07/2021      A1C RESULTS:  Lab Results   Component Value Date    A1C 5.7 (H) 02/13/2020     CC  Christin Holt, APRN CNP  [address]  [place] [address]      Bess is a 48 year old who is being evaluated via a billable video visit.    How would you like  to obtain your AVS? [employer]  If the video visit is dropped, the invitation should be resent by: Text to cell phone: [phone]  Will anyone else be joining your video visit? No    Video-Visit Details    Type of service:  Video Visit   Video End Time: 9:28  Originating Location (pt. Location): Home    Distant Location (provider location):  On-site  Platform used for Video Visit: Moisés       Thank you for allowing me to participate in the care of your patient.      Sincerely,     Fabian Reddy NP     [facility]  cc:   Christin Holt, JULIETA CNP  [address]  [place] [address]

## 2024-10-14 NOTE — PROGRESS NOTES
Cardiology Video Visit Progress Note    Service Date: October 14, 2024  Primary Cardiology Team: Dr. Diaz and Christin Holt, APRN, CNP    Ms. Bess Enamorado is a 48 year old female who is being evaluated via a billable video visit.    I had the pleasure of speaking with Ms. Bess Enamorado via video visit today. She is a very pleasant 48 year old female with a past medical history of hypertension, history of pulmonary embolism, severe pulmonary hypertension, NARCISA on CPAP, morbid obesity with BMI around 56, chronic HFpEF with primarily right-sided heart failure and RV dysfunction, multiple sclerosis, lymphedema, and binge eating disorder.     Over the years, patient has had progressive RV dilation and dysfunction on her echocardiograms. Her most recent echocardiogram in July of this year showed severe pulmonary hypertension with RVSP 82 mm hg +RA, and IVC diameter >2.1 cm collapsing <50% with sniff suggesting a high RA pressure  estimated at 15 mmHg or greater. Her LV function is hyperdynamic.  She has mild mitral stenosis.     The patient last met with my colleague, Christin Holt, NP, via virtual visit in August of this year.  She was doing okay from a cardiac standpoint at that time and had been working with physical therapy feeling that her mobility and stamina had been improving slightly.  GLP-1 agonist have been discussed to help with weight loss, but was denied by insurance.  She has been on regimen of Bumex 1 mg twice daily with spironolactone 12.5 mg daily and potassium supplement 20 mEq once daily.  She is also on Jardiance 10 mg once daily.    Today, I connected with Bess via video visit for a routine 2-month check-in. She tells me that she has been feeling okay from a cardiac standpoint. She remains on oxygen most of the day at 2 L and has been using her CPAP consistently for management of sleep apnea. She states she struggles with checking her weights consistently with her history of binge eating  disorder, but tries to do this at least once a week. This has been stable around 295-300 lbs over the past few months. She is primarily wheelchair bound, but does some walking with a walker at home as well. She states her energy level and strength has been improved recently and she has been able to do some light activity like cooking and chores around the house. She feels her shortness of breath has been stable if not improved of late. She has not noticed worsening lower extremity edema, orthopnea, chest pain, or palpitations.    ASSESSMENT:  Severe right-sided heart failure  Severe pulmonary hypertension  Chronic lymphedema  NARCISA on CPAP  Multiple sclerosis  Morbid obesity  History of pulmonary embolism    PLAN:  - Continue current regimen.  - Counseled on continuing to try to check weights as able, provided info on sticking to a low sodium diet, and reviewed signs/symptoms of volume overload to monitor for and when to contact the clinic.   - Recommend follow up with Christin Holt NP in the [facility] in about 3 months with labs beforehand for a BMP and NT pro BNP level.     Video-Visit Details  Total time of Video: 13 minutes    25 total minutes was spent today including chart review, precharting, history and exam via video call, post visit documentation, and reviewing studies as outlined above.     Originating Location (pt. Location): Home  Distant Location (provider location): [place] or [facility]    Platform used for Video Visit: Moisés    Thank you for the opportunity to participate in this pleasant patient's care. We would be happy to see her sooner if needed for any concerns in the meantime.     Please kindly note that this document was completed in part using Dragon voice recognition software. Although reviewed after completion, some word substitutions and typographical errors may occur. Please contact me if clarification is needed.     Edgardo Reddy APRN, CNP   Nurse  Practitioner  [facility]    Orders this Visit:  Orders Placed This Encounter   Procedures    Basic metabolic panel    N terminal pro BNP outpatient    Follow-Up with Cardiology- [facility]     No orders of the defined types were placed in this encounter.    There are no discontinued medications.  Encounter Diagnoses   Name Primary?    Chronic heart failure with preserved ejection fraction (H)     Chronic right-sided heart failure (H) Yes     CURRENT MEDICATIONS:  Current Outpatient Medications   Medication Sig Dispense Refill    acetaminophen (TYLENOL) 650 MG CR tablet Take 650 mg by mouth every 8 hours as needed for mild pain or fever      aspirin (ASPIRIN LOW DOSE) 81 MG EC tablet Take 1 tablet (81 mg) by mouth daily      bumetanide (BUMEX) 2 MG tablet Take 2 tablets (4 mg) by mouth 2 times daily 120 tablet 2    Emollient (GOLD BOND MEDICATED BODY EX) Externally apply 1 Application topically 2 times daily as needed      empagliflozin (JARDIANCE) 10 MG TABS tablet Take 1 tablet (10 mg) by mouth daily 90 tablet 1    linezolid (ZYVOX) 600 MG tablet Take 1 tablet (600 mg) by mouth 2 times daily 23 tablet 0    metoprolol succinate ER (TOPROL XL) 25 MG 24 hr tablet Take 0.5 tablets (12.5 mg) by mouth daily      miconazole (MICATIN) 2 % AERP powder Apply topically 2 times daily 128 g 3    potassium chloride sheila ER (KLOR-CON M20) 20 MEQ CR tablet Take 1 tablet (20 mEq) by mouth daily 30 tablet 0    Pramoxine HCl (VAGISIL ANTI-ITCH MEDICATED EX) Externally apply topically as needed      saccharomyces boulardii (FLORASTOR) 250 MG capsule Take 1 capsule (250 mg) by mouth 2 times daily 60 capsule 0    spironolactone (ALDACTONE) 25 MG tablet Take 0.5 tablets (12.5 mg) by mouth daily 15 tablet 0     ALLERGIES  Allergies   Allergen Reactions    Nkda [No Known Drug Allergy]      PAST MEDICAL, SURGICAL, FAMILY HISTORY:  History was reviewed and updated as needed, see medical record.    SOCIAL HISTORY:  Social  History     Socioeconomic History    Marital status: Single     Spouse name: Not on file    Number of children: Not on file    Years of education: Not on file    Highest education level: Not on file   Occupational History     Employer: [employer]   Tobacco Use    Smoking status: Never    Smokeless tobacco: Never   Vaping Use    Vaping status: Never Used   Substance and Sexual Activity    Alcohol use: Yes     Comment: social    Drug use: No    Sexual activity: Not Currently     Partners: Male     Birth control/protection: Pill   Other Topics Concern    Parent/sibling w/ CABG, MI or angioplasty before 65F 55M? No   Social History Narrative    , 3rd-5th grade     Social Determinants of Health     Financial Resource Strain: Low Risk  (9/25/2023)    Financial Resource Strain     Within the past 12 months, have you or your family members you live with been unable to get utilities (heat, electricity) when it was really needed?: No   Food Insecurity: Low Risk  (3/21/2024)    Food Insecurity     Within the past 12 months, did you worry that your food would run out before you got money to buy more?: No     Within the past 12 months, did the food you bought just not last and you didn t have money to get more?: No   Transportation Needs: Low Risk  (3/21/2024)    Transportation Needs     Within the past 12 months, has lack of transportation kept you from medical appointments, getting your medicines, non-medical meetings or appointments, work, or from getting things that you need?: No   Physical Activity: Inactive (3/21/2024)    Exercise Vital Sign     Days of Exercise per Week: 0 days     Minutes of Exercise per Session: 0 min   Stress: No Stress Concern Present (12/4/2020)    [employer] [place] [employer] - Occupational Stress Questionnaire     Feeling of Stress : Only a little   Social Connections: Unknown (3/1/2022)    Social Connection and Isolation Panel [NHANES]     Frequency of  Communication with Friends and Family: Twice a week     Frequency of Social Gatherings with Friends and Family: Twice a week     Attends Roman Catholic Services: Not on file     Active Member of Clubs or Organizations: Not on file     Attends Club or Organization Meetings: Not on file     Marital Status: Not on file   Interpersonal Safety: Low Risk  (9/27/2023)    Interpersonal Safety     Do you feel physically and emotionally safe where you currently live?: Yes     Within the past 12 months, have you been hit, slapped, kicked or otherwise physically hurt by someone?: No     Within the past 12 months, have you been humiliated or emotionally abused in other ways by your partner or ex-partner?: No   Housing Stability: Low Risk  (3/21/2024)    Housing Stability     Do you have housing? : Yes     Are you worried about losing your housing?: No     Review of Systems:  Focused cardiovascular and respiratory review of systems is negative other than the symptoms noted above in the HPI.     PHYSICAL EXAM:  Patient Reported Vitals:  BP: Not reported  Pulse: Not reported  Weight: Not reported    There were no vitals taken for this visit.   Wt Readings from Last 4 Encounters:   09/11/24 135.2 kg (298 lb)   08/05/24 133.8 kg (295 lb)   07/19/24 146.5 kg (322 lb 15.6 oz)   06/11/24 134.3 kg (296 lb)     General Appearance: Appears comfortable in no acute distress.  Respiratory: Breathing non-labored. No audible wheezing. No cough. On oxygen by nasal cannula.   Cardiovascular: Appears well perfused.  Neurologic: No evidence of focal defect.  Psychiatric: Appropriate affect.    The rest of the comprehensive physical examination is unable be performed due to this visit being completed via video call.    Recent Lab Results:  LIPID RESULTS:  Lab Results   Component Value Date    CHOL 93 09/27/2023    CHOL 162 02/11/2016    HDL 28 (L) 09/27/2023    HDL 64 02/11/2016    LDL 42 09/27/2023    LDL 75 02/11/2016    TRIG 116 09/27/2023    TRIG 113  02/11/2016    CHOLHDLRATIO 4.0 11/07/2011     LIVER ENZYME RESULTS:  Lab Results   Component Value Date    AST 16 07/22/2024    AST 18 03/07/2021    ALT 7 07/22/2024    ALT 17 03/07/2021     CBC RESULTS:  Lab Results   Component Value Date    WBC 3.7 (L) 07/23/2024    WBC 7.3 03/07/2021    RBC 4.70 07/23/2024    RBC 4.91 03/07/2021    HGB 13.0 09/11/2024    HGB 13.4 07/23/2024    HGB 12.5 03/07/2021    HCT 42.4 07/23/2024    HCT 42.8 03/07/2021    MCV 90 07/23/2024    MCV 87 03/07/2021    MCH 28.5 07/23/2024    MCH 25.5 (L) 03/07/2021    MCHC 31.6 07/23/2024    MCHC 29.2 (L) 03/07/2021    RDW 18.1 (H) 07/23/2024    RDW 15.6 (H) 03/07/2021     (L) 07/23/2024     03/08/2021     BMP RESULTS:  Lab Results   Component Value Date     10/09/2024     03/07/2021    POTASSIUM 3.5 10/09/2024    POTASSIUM 3.6 09/06/2022    POTASSIUM 4.3 03/07/2021    CHLORIDE 101 10/09/2024    CHLORIDE 98 09/06/2022    CHLORIDE 105 03/07/2021    CO2 27 10/09/2024    CO2 25 09/06/2022    CO2 30 03/07/2021    ANIONGAP 10 10/09/2024    ANIONGAP 11 09/06/2022    ANIONGAP 4 03/07/2021     (H) 10/09/2024    GLC 88 07/22/2024    GLC 94 09/06/2022    GLC 99 03/07/2021    BUN 19.6 10/09/2024    BUN 22 09/06/2022    BUN 12 03/07/2021    CR 0.78 10/09/2024    CR 0.62 03/07/2021    GFRESTIMATED >90 10/09/2024    GFRESTIMATED >90 03/07/2021    GFRESTBLACK >90 03/07/2021    EVITA 10.1 10/09/2024    EVITA 9.4 03/07/2021      A1C RESULTS:  Lab Results   Component Value Date    A1C 5.7 (H) 02/13/2020     CC  Christin Holt, APRN CNP  [address]  [place] [address]

## 2024-10-14 NOTE — PROGRESS NOTES
Bess is a 48 year old who is being evaluated via a billable video visit.    How would you like to obtain your AVS? [facility]  If the video visit is dropped, the invitation should be resent by: Text to cell phone: [phone]  Will anyone else be joining your video visit? No    Video-Visit Details    Type of service:  Video Visit   Video End Time: 9:28  Originating Location (pt. Location): Home    Distant Location (provider location):  On-site  Platform used for Video Visit: Moisés

## 2024-10-21 ENCOUNTER — TELEPHONE (OUTPATIENT)
Dept: FAMILY MEDICINE | Facility: CLINIC | Age: 49
End: 2024-10-21
Payer: MEDICARE

## 2024-10-21 NOTE — TELEPHONE ENCOUNTER
Home Care is calling regarding an established patient with [facility].       Requesting orders from: Mikala Luna  Provider is following patient: Yes  Is this a 60-day recertification request?  No    Orders Requested    Skilled Nursing  Request for discontinuation of care   Goals have been met/progressing.    Key nurse from [facility] reports that goals have been met and that she is able to independent.    Called and left message on confidential voice mail that verbal order was given by this writer.    Verbal orders given.  Home Care will send orders for provider to sign.  Confirmed ok to leave a detailed message with call back.  Contact information confirmed and updated as needed.    Kristy Garcia RN

## 2024-11-05 ASSESSMENT — ANXIETY QUESTIONNAIRES
GAD7 TOTAL SCORE: 6
5. BEING SO RESTLESS THAT IT IS HARD TO SIT STILL: NOT AT ALL
IF YOU CHECKED OFF ANY PROBLEMS ON THIS QUESTIONNAIRE, HOW DIFFICULT HAVE THESE PROBLEMS MADE IT FOR YOU TO DO YOUR WORK, TAKE CARE OF THINGS AT HOME, OR GET ALONG WITH OTHER PEOPLE: NOT DIFFICULT AT ALL
3. WORRYING TOO MUCH ABOUT DIFFERENT THINGS: SEVERAL DAYS
7. FEELING AFRAID AS IF SOMETHING AWFUL MIGHT HAPPEN: SEVERAL DAYS
8. IF YOU CHECKED OFF ANY PROBLEMS, HOW DIFFICULT HAVE THESE MADE IT FOR YOU TO DO YOUR WORK, TAKE CARE OF THINGS AT HOME, OR GET ALONG WITH OTHER PEOPLE?: NOT DIFFICULT AT ALL
4. TROUBLE RELAXING: SEVERAL DAYS
6. BECOMING EASILY ANNOYED OR IRRITABLE: SEVERAL DAYS
GAD7 TOTAL SCORE: 6
2. NOT BEING ABLE TO STOP OR CONTROL WORRYING: SEVERAL DAYS
7. FEELING AFRAID AS IF SOMETHING AWFUL MIGHT HAPPEN: SEVERAL DAYS
GAD7 TOTAL SCORE: 6
1. FEELING NERVOUS, ANXIOUS, OR ON EDGE: SEVERAL DAYS

## 2024-11-07 ENCOUNTER — VIRTUAL VISIT (OUTPATIENT)
Dept: FAMILY MEDICINE | Facility: CLINIC | Age: 49
End: 2024-11-07
Payer: MEDICARE

## 2024-11-07 DIAGNOSIS — F33.1 MODERATE EPISODE OF RECURRENT MAJOR DEPRESSIVE DISORDER (H): Chronic | ICD-10-CM

## 2024-11-07 DIAGNOSIS — G35 MULTIPLE SCLEROSIS (H): Chronic | ICD-10-CM

## 2024-11-07 DIAGNOSIS — F41.1 GENERALIZED ANXIETY DISORDER: Primary | Chronic | ICD-10-CM

## 2024-11-07 PROCEDURE — 99214 OFFICE O/P EST MOD 30 MIN: CPT | Mod: 95 | Performed by: FAMILY MEDICINE

## 2024-11-07 RX ORDER — ESCITALOPRAM OXALATE 10 MG/1
10 TABLET ORAL DAILY
Qty: 90 TABLET | Refills: 1 | Status: SHIPPED | OUTPATIENT
Start: 2024-11-07

## 2024-11-07 NOTE — PATIENT INSTRUCTIONS
"[facility] will call you to coordinate your care as prescribed by your provider. If you don't hear from a representative within 2 business days, please call [phone].  Neurology        When you are out of refills or the refills say \"zero\", it is time to schedule your next appointment in clinic!    Labs are released to you almost immediately and sometimes before I have had a chance to review them.  I review labs regularly and once they are all in, you will either be sent a letter with your results or if you are signed up for on-line services, you will be notified that results are available to you on EXPO. If there are serious findings, you typically will be called.    If you have any questions about your visit, your symptoms, your medication, your test results or it is not clear what your diagnosis or treatment plan is please contact me (via IPLocks) or call the care team at [phone] and say \"Care Team\"          "

## 2024-11-07 NOTE — PROGRESS NOTES
"Bess is a 48 year old who is being evaluated via a billable video visit.    How would you like to obtain your AVS? MyChart  If the video visit is dropped, the invitation should be resent by: Text to cell phone: [phone]  Will anyone else be joining your video visit? No      Assessment & Plan   Moderate episode of recurrent major depressive disorder (H)  Generalized anxiety disorder   Will start lexapro 10 mg   She will let me know if there are GI side effects or other problems    - escitalopram (LEXAPRO) 10 MG tablet; Take 1 tablet (10 mg) by mouth daily.      Multiple sclerosis (H)  Hasn't seen a neurologist for several years, her neurologist changed practices  She would like to re-establish with someone  - Adult Neurology  Referral; Future          BMI  Estimated body mass index is 51.15 kg/m  as calculated from the following:    Height as of 9/11/24: 1.626 m (5' 4\").    Weight as of 9/11/24: 135.2 kg (298 lb).             Subjective   Bess is a 48 year old, presenting for the following health issues:  Video Visit (Depression)        11/7/2024     2:25 PM   Additional Questions   Roomed by Vero REAL CMA   Accompanied by Self       Video Start Time:  3:29 pm    Depression and Anxiety   No current medication    Stopped sertraline in late September because she found that she was having a hard time controlling bowel movements.   She stopped for a few weeks and then when she restarted it, uncontrollable diarrhea started again.      Feels her moods are okay, situational anxiety (if she has to leave the house- anxious about transportation and bathroom issues while she's out of the house).   Got anxious earlier this week when she saw that her Mom was at the hospital (turned out to be only because of a test).  Was able to talk herself through it and didn't ramp up.     Bess is not sure if she needs to be on a medication or now.  She feels like she would recognize a need to return to medication but she's \"not " "sure\".      No home care currently.   Nobody really seeing her on a regular basis.         History of Present Illness       Mental Health Follow-up:  Patient presents to follow-up on Depression & Anxiety.Patient's depression since last visit has been:  Good  The patient is not having other symptoms associated with depression.  Patient's anxiety since last visit has been:  Better  The patient is not having other symptoms associated with anxiety.  Any significant life events: No  Patient is not feeling anxious or having panic attacks.  Patient has no concerns about alcohol or drug use.    She eats 0-1 servings of fruits and vegetables daily.She consumes 1 sweetened beverage(s) daily.She exercises with enough effort to increase her heart rate 9 or less minutes per day.  She exercises with enough effort to increase her heart rate 3 or less days per week. She is missing 1 dose(s) of medications per week.  She is not taking prescribed medications regularly due to other.       Social History     Tobacco Use    Smoking status: Never    Smokeless tobacco: Never   Vaping Use    Vaping status: Never Used   Substance Use Topics    Alcohol use: Yes     Comment: social    Drug use: No         10/19/2023    10:11 AM 2/6/2024     4:42 PM 10/2/2024     2:27 PM   PHQ   PHQ-9 Total Score 9 4 6   Q9: Thoughts of better off dead/self-harm past 2 weeks Not at all Not at all  Not at all        Patient-reported         10/19/2023    10:11 AM 10/2/2024     2:28 PM 11/5/2024     4:13 PM   TALIB-7 SCORE   Total Score  3 (minimal anxiety) 6 (mild anxiety)   Total Score 0 3 6        Patient-reported         10/2/2024     2:27 PM   Last PHQ-9   1.  Little interest or pleasure in doing things 1    2.  Feeling down, depressed, or hopeless 1    3.  Trouble falling or staying asleep, or sleeping too much 1    4.  Feeling tired or having little energy 1    5.  Poor appetite or overeating 2    6.  Feeling bad about yourself 0    7.  Trouble " concentrating 0    8.  Moving slowly or restless 0    Q9: Thoughts of better off dead/self-harm past 2 weeks 0    PHQ-9 Total Score 6       Patient-reported         11/5/2024     4:13 PM   TALIB-7    1. Feeling nervous, anxious, or on edge 1    2. Not being able to stop or control worrying 1    3. Worrying too much about different things 1    4. Trouble relaxing 1    5. Being so restless that it is hard to sit still 0    6. Becoming easily annoyed or irritable 1    7. Feeling afraid, as if something awful might happen 1    TALIB-7 Total Score 6    If you checked any problems, how difficult have they made it for you to do your work, take care of things at home, or get along with other people? Not difficult at all        Patient-reported       Suicide Assessment Five-step Evaluation and Treatment (SAFE-T)            Objective           Vitals:  No vitals were obtained today due to virtual visit.    Physical Exam   GENERAL: alert and no distress  EYES: Eyes grossly normal to inspection.  No discharge or erythema, or obvious scleral/conjunctival abnormalities.  RESP: No audible wheeze, cough, or visible cyanosis.    SKIN: deborah appearing face with telangectasias present  NEURO: Cranial nerves grossly intact.  Mentation and speech appropriate for age.  PSYCH: Appropriate affect, tone, and pace of words          Video-Visit Details    Type of service:  Video Visit   Video End Time: 3:42 PM   Originating Location (pt. Location): Home    Distant Location (provider location):  On-site  Platform used for Video Visit: Moisés  Signed Electronically by: Mikala Luna MD

## 2024-11-08 ENCOUNTER — TELEPHONE (OUTPATIENT)
Dept: GASTROENTEROLOGY | Facility: CLINIC | Age: 49
End: 2024-11-08
Payer: MEDICARE

## 2024-11-09 ENCOUNTER — HEALTH MAINTENANCE LETTER (OUTPATIENT)
Age: 49
End: 2024-11-09

## 2024-11-14 NOTE — TELEPHONE ENCOUNTER
RECORDS RECEIVED FROM: [facility]    REASON FOR VISIT: MS   PROVIDER: Jose Francisco   DATE OF APPT: 2/12/25   NOTES (FOR ALL VISITS) STATUS DETAILS   OFFICE NOTE from referring provider Internal [facility]- ov/referral 11/7/24   MEDICATION LIST Internal

## 2024-11-15 ENCOUNTER — DOCUMENTATION ONLY (OUTPATIENT)
Dept: PULMONOLOGY | Facility: CLINIC | Age: 49
End: 2024-11-15
Payer: MEDICARE

## 2024-11-15 DIAGNOSIS — J96.11 CHRONIC RESPIRATORY FAILURE WITH HYPOXIA (H): Primary | ICD-10-CM

## 2024-11-18 ENCOUNTER — TELEPHONE (OUTPATIENT)
Dept: PULMONOLOGY | Facility: CLINIC | Age: 49
End: 2024-11-18
Payer: MEDICARE

## 2024-11-18 NOTE — TELEPHONE ENCOUNTER
[facility] Call [facility]    Phone Message    May a detailed message be left on voicemail: yes     Reason for Call: Order(s): Other:   Reason for requested: Patient received letter that she will need oxygen reordered by late December. Patient also requesting CPAP to be reordered, said she was told this could be done by this provider as well. She can be reached back at [phone] if needed.  Date needed: 12/2024  Provider name: Onur Chaudhry MD    Action Taken: Message routed to:  Other: PULM    Travel Screening: Not Applicable     Date of Service: 11/18/24

## 2024-11-19 NOTE — TELEPHONE ENCOUNTER
Spoke with Cyndie. Informed her Dr. Chaudhry did sign the oxygen renewal from. He could fill our order for CPAP supplies but she does have a current sleep medicine provider. Requested Bess to call her sleep provider to follow her with the CPAP. If she has an issue with this, she will give us a call back.

## 2024-12-16 ENCOUNTER — TELEPHONE (OUTPATIENT)
Dept: FAMILY MEDICINE | Facility: CLINIC | Age: 49
End: 2024-12-16
Payer: MEDICARE

## 2024-12-16 DIAGNOSIS — I50.32 CHRONIC HEART FAILURE WITH PRESERVED EJECTION FRACTION (H): ICD-10-CM

## 2024-12-16 DIAGNOSIS — I50.32 CHRONIC DIASTOLIC CONGESTIVE HEART FAILURE (H): ICD-10-CM

## 2024-12-17 ENCOUNTER — TELEPHONE (OUTPATIENT)
Dept: CARDIOLOGY | Facility: CLINIC | Age: 49
End: 2024-12-17
Payer: MEDICARE

## 2024-12-17 DIAGNOSIS — I50.9 ACUTE ON CHRONIC CONGESTIVE HEART FAILURE, UNSPECIFIED HEART FAILURE TYPE (H): ICD-10-CM

## 2024-12-17 RX ORDER — SPIRONOLACTONE 25 MG/1
12.5 TABLET ORAL DAILY
Qty: 45 TABLET | Refills: 2 | Status: SHIPPED | OUTPATIENT
Start: 2024-12-17

## 2024-12-17 NOTE — TELEPHONE ENCOUNTER
Message received on Team 3 phone from [facility]   Patient requested refill of spironolactone to Dr. Luna  Please see refill encounter from 12/16.     [facility] staff requesting cardiology to reach out to patient to discuss meds and provide refills if needed.     Thank you,   Taco ROOT RN

## 2024-12-17 NOTE — TELEPHONE ENCOUNTER
[facility] Cardiology Refill Guideline reviewed.  Medication meets criteria for refill. Tomasa Whittaker RN Cardiology December 17, 2024, 1:41 PM

## 2024-12-17 NOTE — TELEPHONE ENCOUNTER
See above cardiology note.  They note Bumex 1 mg twice daily and Spironolactone.      The prescription refill notes that it was discontinued by another cardiologist in April and may not be appropriate to refill.      CALL patient and check with them on what they are currently taking.  If there is discrepancy, may need to go through Cardiology for refill of medication.  I am not managing her heart failure.     Mikala Luna M.D.

## 2024-12-17 NOTE — TELEPHONE ENCOUNTER
Chart says:  : BUMETANIDE 1MG TABLETS          Will file in chart as: bumetanide (BUMEX) 1 MG tablet    The original prescription was discontinued on 4/4/2024 by Agnieszka Diaz MD. Renewing this prescription may not be appropriate.    Sig: TAKE 3 TABLETS(3 MG) BY MOUTH DAILY       Med list says she is on Bumex 2 mg, two tabs BID

## 2024-12-17 NOTE — TELEPHONE ENCOUNTER
Called patient. Patient was unaware that spironolactone had been discontinued and has been taking both spironolactone and bumetanide simultaneously. Patient not sure if she needs this refill now.    Called and left message for Edgardo Reddy's care team to reach out to the patient to clarify medication regimen and provide refill if indicated.    Anali FRENCH LPN

## 2024-12-18 ENCOUNTER — TELEPHONE (OUTPATIENT)
Dept: CARDIOLOGY | Facility: CLINIC | Age: 49
End: 2024-12-18
Payer: MEDICARE

## 2024-12-18 RX ORDER — BUMETANIDE 1 MG/1
1 TABLET ORAL
Qty: 180 TABLET | Refills: 3 | Status: CANCELLED | OUTPATIENT
Start: 2024-12-18

## 2024-12-18 NOTE — TELEPHONE ENCOUNTER
RN to please CALL patient.      Anali Gautam NP I believe was incorrect in her phone call with the patient yesterday regarding SPIRONOLACTONE.  See my original message to the care team, nowhere did I indicate that spironolactone had been discontinued and I believe she gave that information to the patient.  She should still be taking her Spironolactone daily as prescribed.  I apologize for any confusion.     My question was intended to be about the BUMEX or bumetanide as the refill was for a dose different than what is recorded in the cardiology notes.      What I really need to know is what dose of the BUMEX is she taking?  The cardiologist note indicates 1 pill twice daily of the 1 mg BUMEX.    The refill is for 3 tablets daily and what is listed in her medication list is 2 tablets (4 mg) twice daily.      So, ultimately we have three different medication doses either listed/reported or up for refill.  Overall this is managed by her cardiologist and the [facility] heart failure team.     Again, she should continue the SPIRONOLACTONE and BUMETANIDE together, but I need clarification over what dose she is taking.  I will also send this message to the cardiology NP who has been seeing her.     Mikala Luna M.D.

## 2024-12-18 NOTE — TELEPHONE ENCOUNTER
RN called and reviewed below information from Fabian Reddy, Cardiology. and Bumex 1 mg twice daily. RN cued up Bumex as Fabian Reddy advised.    Please review.    Isabella Salinas RN on 12/18/2024 at 4:00 PM

## 2024-12-18 NOTE — TELEPHONE ENCOUNTER
Potassium refill request.  It looks like patient is also receiving spironolactone.  Patient has recently  seen Fabian Reddy NP.      Thank you    Alpesh Saeed RN

## 2024-12-18 NOTE — TELEPHONE ENCOUNTER
As of the last visit, I was under the understanding that she was taking Bumex 1 mg twice daily with spironolactone 12.5 mg daily and potassium supplement 20 mEq once daily along with Jardiance 10 mg once daily.  It's possible that I misunderstood what she was truly taking. I only met her once for a virtual visit in follow up while my colleague who had previously followed with her was out on maternity leave. Agree with having the RN team contact her for clarification. Thank you!

## 2024-12-19 RX ORDER — BUMETANIDE 2 MG/1
4 TABLET ORAL 2 TIMES DAILY
Qty: 360 TABLET | Refills: 0 | Status: SHIPPED | OUTPATIENT
Start: 2024-12-19

## 2024-12-19 RX ORDER — BUMETANIDE 1 MG/1
TABLET ORAL
Qty: 270 TABLET | Refills: 3 | OUTPATIENT
Start: 2024-12-19

## 2024-12-19 NOTE — TELEPHONE ENCOUNTER
Called patient myself to clarify as it still seems unclear/incorrect.    Patient has been taking, as prescribed Bumex 4 mg twice daily.  She ran out of the 2 mg tablets she had and was taking 1 mg pills but taking four at a time.     Her medication list WAS correct in dosing.  This has been prescribed by cardiology, but to make sure she has enough medication at this time, I will refill.      Patient does know that she is supposed to also continue the spironolactone.    She DOES have an appointment with cardiology in January.     FYI to SELENA Culp M.D.

## 2025-01-06 ENCOUNTER — PATIENT OUTREACH (OUTPATIENT)
Dept: CARDIOLOGY | Facility: CLINIC | Age: 50
End: 2025-01-06
Payer: MEDICARE

## 2025-01-06 NOTE — PROGRESS NOTES
Confirmed C[facility] RN added to Care Teams. Updated [facility] Care Coordination patient enrollment status.     Next follow up/orders due:   Future Appointments   Date Time Provider Department Center   1/13/2025 12:15 PM [place] [facility]   1/13/2025 12:55 PM Christin Holt APRN CNP [facility] PSA [facility]IN   2/12/2025  2:30 PM Yeny Felton MD [facility]   2/19/2025 10:45 AM  LAB [facility]   2/19/2025 11:00 AM [facility]   2/19/2025 12:00 PM Cyndie Joyner MD [facility]   3/17/2025  3:30 PM Onur Chaudhry MD [facility]   4/22/2025 10:30 AM Yanet Pappas PA-C [facility] SLEEP      Jesusita Currie RN, BSN  01/06/25 at 10:35 AM

## 2025-01-10 NOTE — PROGRESS NOTES
~Cardiology Clinic Visit~    Primary Cardiologist: Dr. Diaz  Reason for visit: HF follow up    History of Present Illness    Bess Enamorado is a wonderfully pleasant 48 year old female with a past medical history notable for diastolic CHF, tachycardia, HFpEF, history of NSTEMI, hypertension, severe pulmonary hypertension, NARCISA on CPAP, morbid obesity with BMI around 56, MS, lymphedema, history of pulmonary embolism, renal insufficiency.     In past, patient used to see Dr. Sanchez at the [facility] for RV failure.  She last saw him in 2022.  She then was referred to [facility] where she met Dr. Diaz for severe RV failure and cor pulmonale.     In summary, patient has history of morbid obesity and uses a wheelchair assistance for mobility.  She has done this for the past many years.  She has sleep apnea and is compliant with CPAP use.  She has been on Bumex for many years.  She has lymphedema in addition to RV failure.     Over the years, patient has had progressive RV dilation and dysfunction on her echocardiogram.  Her PA pressures are close to 70+ RAP, which are also estimated to be very high based on IVC size.  Her LV function is hyperdynamic.  She has mild mitral stenosis.     She was seen in cardiology clinic in April 2024 as a new heart failure visit.  At that time, patient noted some degree of orthopnea, but mainly has severe symptoms of right-sided congestive heart failure.  At that point, she was on 3 mg of Bumex daily.  She is a poor functional status.    She had multiple hospitalizations in 2024.  When she discharged from the hospital on 5/1/2024, her weight was noted to be approximately 304 pounds (vs 330# at previous office visit).  Over the fall and winter, her weight improved and was under 300#.  Since then, we have gradually titrated up her diuretics to a regimen of Bumex 4 mg twice daily.    Today, she mentions that her swelling and weight are up.  She had a difficult  holiday season and did not do well with her diet.  She is consistently only taking Bumex 4 mg daily, not routinely twice a day as prescribed.  She has difficult with BID diuretic regimen with her limited ambulatory status.  Her BNP is improved so there is also likely an element of caloric weight gain over the holidays.  She is conversational on RA.    I had a patricia discussion with Bess that we are at a threshold for volume status today.  I will adjust her regimen and if she does not turn the corner, then we need to discuss infusion clinic vs hospital admission.  I am reassured to see that her breathing is stable and she feels fine overall.  There is no sign of acute distress.  I am noting she is a bit tachycardic today.  Traveling to clinic is exceedingly difficult for her sadly.    Labs reviewed.  BMP stable.  BNP suprisingly improved from prior ~1,700.  __________________________________________________________________         Assessment and Impression:     Severe right-sided heart failure  Critical pulmonary hypertension, chronic  Severe right-sided vascular congestion and renal congestion causing inability to achieve adequate diuresis.  Dietary noncompliance and morbid obesity.  Limited functional capacity -mobility via wheelchair.  Currently on p.o. Bumex to 4 mg twice daily (8 AM + 2 PM) + KCl 20 mEq twice daily.  Chronic lymphedema  NARCISA on CPAP  Multiple sclerosis  Per last hospitalization, patient immobile and does not routinely follow with neurology.  Not on any PTA medications for MS.  She was given a written neurology referral and given progressive respiratory failure of the past 3 to 4 months was recommended to follow-up with them to evaluate if there is any component of neuromuscular weakness secondary to MS driving her PH.  Morbid obesity, denied Zepbound.  History of pulmonary embolism  History of NSTEMI, on aspirin 81 mg daily.         Recommendations and Plan:     Since patient is only taking her  diuretics once daily, I will change her regimen to PO Bumex 6 mg daily in the AM, which will be an increase from what she was taking.  Increase potassium supplement to 30 mEq daily.  Daily weights.  Low sodium diet is necessary.  We have agreed to repeat BMP labs at her next office visit in February.  I have discussed with her that should she experience any racing heart, palpitations, cramping, dizziness or lightheadedness, then she needs to discuss these side effects with us immediately.  Consideration for infusion clinic at follow up if weight remains up.  Pharmacy check and will run PA for GLP-1 coverage.   ANNAMARIE visit in 3-months.  We have agreed to this follow up plan in considering transportation is very challenging for Bess.  She has multiple upcoming visits in the next few months.  In person visits preferred given her status.  __________________________________________________________________    Thank you for the opportunity to participate in this pleasant patient's care.    We would be happy to see this patient sooner for any concerns in the meantime.    JULIETA Anguiano, CNP   Nurse Practitioner  [facility]    Today's clinic visit entailed:  Ordering of each unique test  Prescription drug management  The level of medical decision making during this visit was of moderate complexity.  Review of the result(s) of each unique test - cardiac testing, cardiac imaging, labs  Care everywhere reviewed for additional records to facilitate comprehensive patient care.    Orders this Visit:  Orders Placed This Encounter   Procedures    Basic metabolic panel    Follow-Up with Cardiology- ANNAMARIE     Orders Placed This Encounter   Medications    bumetanide (BUMEX) 2 MG tablet     Sig: Take 3 tablets (6 mg) by mouth daily.     Dispense:  360 tablet     Refill:  1    potassium chloride sheila ER (KLOR-CON M10) 10 MEQ CR tablet     Sig: Take 3 tablets (30 mEq) by mouth daily.     Dispense:  90 tablet      Refill:  3     Medications Discontinued During This Encounter   Medication Reason    potassium chloride sheila ER (KLOR-CON M20) 20 MEQ CR tablet     bumetanide (BUMEX) 2 MG tablet      Encounter Diagnoses   Name Primary?    Chronic heart failure with preserved ejection fraction (H)     Chronic right-sided heart failure (H) Yes    Pulmonary hypertension (H)     NARCISA on CPAP     Obesity hypoventilation syndrome (H)     Chronic diastolic congestive heart failure (H)     Class 3 severe obesity due to excess calories with serious comorbidity and body mass index (BMI) of 50.0 to 59.9 in adult (H)      CURRENT MEDICATIONS:  Current Outpatient Medications   Medication Sig Dispense Refill    acetaminophen (TYLENOL) 650 MG CR tablet Take 650 mg by mouth every 8 hours as needed for mild pain or fever      aspirin (ASPIRIN LOW DOSE) 81 MG EC tablet Take 1 tablet (81 mg) by mouth daily      bumetanide (BUMEX) 2 MG tablet Take 3 tablets (6 mg) by mouth daily. 360 tablet 1    Emollient (GOLD BOND MEDICATED BODY EX) Externally apply 1 Application topically 2 times daily as needed      empagliflozin (JARDIANCE) 10 MG TABS tablet Take 1 tablet (10 mg) by mouth daily 90 tablet 1    escitalopram (LEXAPRO) 10 MG tablet Take 1 tablet (10 mg) by mouth daily. 90 tablet 1    metoprolol succinate ER (TOPROL XL) 25 MG 24 hr tablet Take 0.5 tablets (12.5 mg) by mouth daily      miconazole (MICATIN) 2 % AERP powder Apply topically 2 times daily 128 g 3    potassium chloride sheila ER (KLOR-CON M10) 10 MEQ CR tablet Take 3 tablets (30 mEq) by mouth daily. 90 tablet 3    Pramoxine HCl (VAGISIL ANTI-ITCH MEDICATED EX) Externally apply topically as needed      saccharomyces boulardii (FLORASTOR) 250 MG capsule Take 1 capsule (250 mg) by mouth 2 times daily 60 capsule 0    spironolactone (ALDACTONE) 25 MG tablet Take 0.5 tablets (12.5 mg) by mouth daily. 45 tablet 2     ALLERGIES     Allergies   Allergen Reactions    Nkda [No Known Drug Allergy]       PAST MEDICAL, SURGICAL, FAMILY, SOCIAL HISTORY:  History was reviewed and updated as needed, see medical record.    Review of Systems:  A 10-point Review Of Systems is otherwise normal except for that which is noted in the HPI and interval summary.    Physical Exam:    Vitals: /69   Pulse 103   Wt 140.6 kg (310 lb)   SpO2 (!) 83%   BMI 53.21 kg/m    Constitutional: Appears stated age, obese, NAD.  Respiratory: Non-labored. Lungs clear, diminished posterior fields.  Cardiovascular: RRR, normal S1 and S2. No M/G/R.  Generalized edema.  GI: Soft, non-distended, non-tender.  Musculoskeletal/Extremities: Symmetrical movement.  Neurologic: No gross focal deficits. Alert, awake.  Psychiatric: Affect appropriate. Mentation normal.    Recent Lab Results:  LIPID RESULTS:  Lab Results   Component Value Date    CHOL 93 09/27/2023    CHOL 162 02/11/2016    HDL 28 (L) 09/27/2023    HDL 64 02/11/2016    LDL 42 09/27/2023    LDL 75 02/11/2016    TRIG 116 09/27/2023    TRIG 113 02/11/2016    CHOLHDLRATIO 4.0 11/07/2011     LIVER ENZYME RESULTS:  Lab Results   Component Value Date    AST 16 07/22/2024    AST 18 03/07/2021    ALT 7 07/22/2024    ALT 17 03/07/2021     CBC RESULTS:  Lab Results   Component Value Date    WBC 3.7 (L) 07/23/2024    WBC 7.3 03/07/2021    RBC 4.70 07/23/2024    RBC 4.91 03/07/2021    HGB 13.0 09/11/2024    HGB 13.4 07/23/2024    HGB 12.5 03/07/2021    HCT 42.4 07/23/2024    HCT 42.8 03/07/2021    MCV 90 07/23/2024    MCV 87 03/07/2021    MCH 28.5 07/23/2024    MCH 25.5 (L) 03/07/2021    MCHC 31.6 07/23/2024    MCHC 29.2 (L) 03/07/2021    RDW 18.1 (H) 07/23/2024    RDW 15.6 (H) 03/07/2021     (L) 07/23/2024     03/08/2021     BMP RESULTS:  Lab Results   Component Value Date     01/13/2025     03/07/2021    POTASSIUM 3.7 01/13/2025    POTASSIUM 3.6 09/06/2022    POTASSIUM 4.3 03/07/2021    CHLORIDE 99 01/13/2025    CHLORIDE 98 09/06/2022    CHLORIDE 105 03/07/2021    CO2  26 01/13/2025    CO2 25 09/06/2022    CO2 30 03/07/2021    ANIONGAP 13 01/13/2025    ANIONGAP 11 09/06/2022    ANIONGAP 4 03/07/2021    GLC 90 01/13/2025    GLC 88 07/22/2024    GLC 94 09/06/2022    GLC 99 03/07/2021    BUN 16.0 01/13/2025    BUN 22 09/06/2022    BUN 12 03/07/2021    CR 0.74 01/13/2025    CR 0.62 03/07/2021    GFRESTIMATED >90 01/13/2025    GFRESTIMATED >90 03/07/2021    GFRESTBLACK >90 03/07/2021    EVITA 10.0 01/13/2025    EVITA 9.4 03/07/2021      A1C RESULTS:  Lab Results   Component Value Date    A1C 5.7 (H) 02/13/2020     INR RESULTS:  Lab Results   Component Value Date    INR 1.35 (H) 04/22/2024    INR 1.23 (H) 02/11/2022    INR 0.96 04/19/2013

## 2025-01-13 ENCOUNTER — OFFICE VISIT (OUTPATIENT)
Dept: CARDIOLOGY | Facility: CLINIC | Age: 50
End: 2025-01-13
Attending: NURSE PRACTITIONER
Payer: COMMERCIAL

## 2025-01-13 ENCOUNTER — LAB (OUTPATIENT)
Dept: LAB | Facility: CLINIC | Age: 50
End: 2025-01-13
Payer: COMMERCIAL

## 2025-01-13 ENCOUNTER — TELEPHONE (OUTPATIENT)
Dept: CARDIOLOGY | Facility: CLINIC | Age: 50
End: 2025-01-13

## 2025-01-13 VITALS
WEIGHT: 293 LBS | BODY MASS INDEX: 53.21 KG/M2 | DIASTOLIC BLOOD PRESSURE: 69 MMHG | OXYGEN SATURATION: 83 % | HEART RATE: 103 BPM | SYSTOLIC BLOOD PRESSURE: 110 MMHG

## 2025-01-13 DIAGNOSIS — I50.32 CHRONIC HEART FAILURE WITH PRESERVED EJECTION FRACTION (H): ICD-10-CM

## 2025-01-13 DIAGNOSIS — E66.01 CLASS 3 SEVERE OBESITY DUE TO EXCESS CALORIES WITH SERIOUS COMORBIDITY AND BODY MASS INDEX (BMI) OF 50.0 TO 59.9 IN ADULT (H): ICD-10-CM

## 2025-01-13 DIAGNOSIS — E66.2 OBESITY HYPOVENTILATION SYNDROME (H): ICD-10-CM

## 2025-01-13 DIAGNOSIS — I27.20 PULMONARY HYPERTENSION (H): ICD-10-CM

## 2025-01-13 DIAGNOSIS — I50.812 CHRONIC RIGHT-SIDED HEART FAILURE (H): Primary | ICD-10-CM

## 2025-01-13 DIAGNOSIS — E66.01 MORBID OBESITY (H): ICD-10-CM

## 2025-01-13 DIAGNOSIS — I50.32 CHRONIC DIASTOLIC CONGESTIVE HEART FAILURE (H): ICD-10-CM

## 2025-01-13 DIAGNOSIS — I50.32 CHRONIC HEART FAILURE WITH PRESERVED EJECTION FRACTION (H): Primary | ICD-10-CM

## 2025-01-13 DIAGNOSIS — E66.813 CLASS 3 SEVERE OBESITY DUE TO EXCESS CALORIES WITH SERIOUS COMORBIDITY AND BODY MASS INDEX (BMI) OF 50.0 TO 59.9 IN ADULT (H): ICD-10-CM

## 2025-01-13 DIAGNOSIS — G47.33 OSA ON CPAP: ICD-10-CM

## 2025-01-13 LAB
ANION GAP SERPL CALCULATED.3IONS-SCNC: 13 MMOL/L (ref 7–15)
BUN SERPL-MCNC: 16 MG/DL (ref 6–20)
CALCIUM SERPL-MCNC: 10 MG/DL (ref 8.8–10.4)
CHLORIDE SERPL-SCNC: 99 MMOL/L (ref 98–107)
CREAT SERPL-MCNC: 0.74 MG/DL (ref 0.51–0.95)
EGFRCR SERPLBLD CKD-EPI 2021: >90 ML/MIN/1.73M2
GLUCOSE SERPL-MCNC: 90 MG/DL (ref 70–99)
HCO3 SERPL-SCNC: 26 MMOL/L (ref 22–29)
NT-PROBNP SERPL-MCNC: 1702 PG/ML (ref 0–900)
POTASSIUM SERPL-SCNC: 3.7 MMOL/L (ref 3.4–5.3)
SODIUM SERPL-SCNC: 138 MMOL/L (ref 135–145)

## 2025-01-13 RX ORDER — POTASSIUM CHLORIDE 750 MG/1
30 TABLET, EXTENDED RELEASE ORAL DAILY
Qty: 90 TABLET | Refills: 3 | Status: SHIPPED | OUTPATIENT
Start: 2025-01-13

## 2025-01-13 RX ORDER — BUMETANIDE 2 MG/1
6 TABLET ORAL DAILY
Qty: 360 TABLET | Refills: 1 | Status: SHIPPED | OUTPATIENT
Start: 2025-01-13

## 2025-01-13 NOTE — TELEPHONE ENCOUNTER
There is no prescription on file for this medication. Please update and route back to the PA team thank you.     Retail Pharmacy Prior Authorization Team   Phone: [phone]

## 2025-01-13 NOTE — PATIENT INSTRUCTIONS
Thank you for your visit with the [facility].    Today's Summary:    Since you are only taking your Bumex once a day, I am going to change your dose to once daily and you are going to take Bumex 6 mg once daily in the morning.  Increase your potassium supplement to 30 mEq daily.  Labs at your next visit in February.  We discussed reasons to get labs checked sooner or go to the emergency room.  I will work on getting approval for the GLP-1 medications.    Follow-up:  Cardiology follow up at [facility]: see me in April..     Cardiology Scheduling~[phone]  Cardiology Clinic RN~[phone] (Tomasa RN, Heather RN; Lauren RN)    It was a pleasure seeing you today.     JULIETA Anguiano, CNP  Certified Nurse Practitioner  [facility]  January 13, 2025  ________________________________________________________

## 2025-01-13 NOTE — TELEPHONE ENCOUNTER
Pt needs PA for Semaglutide 0.25 mg one weekly. Can you help me with the process of sending PA request to get approval for semaglutide?    Patient has severe HFpEF with right sided heart failure, pulmonary hypertension, morbid obesity, obesity hypoventilation syndrome on CPAP.  She has a strong clinical indication for GLP-1 treatment for multiple comorbid conditions.    I would like to start the approval process for semaglutide since studies have shown benefit in reducing cardiovascular events.  If denied, we can then try tirzepatide.    Thanks, Chirstin Andrade RN

## 2025-01-13 NOTE — LETTER
1/13/2025    Mikala Luna MD  [id number]  [facility] [address]    RE: Bess Enamorado       Dear Colleague,     I had the pleasure of seeing Bess Enamorado in the [facility].              ~Cardiology Clinic Visit~    Primary Cardiologist: Dr. Diaz  Reason for visit: HF follow up    History of Present Illness    Bess Enamorado is a wonderfully pleasant 48 year old female with a past medical history notable for diastolic CHF, tachycardia, HFpEF, history of NSTEMI, hypertension, severe pulmonary hypertension, NARCISA on CPAP, morbid obesity with BMI around 56, MS, lymphedema, history of pulmonary embolism, renal insufficiency.     In past, patient used to see Dr. Sanchez at the [facility] for RV failure.  She last saw him in 2022.  She then was referred to [facility] where she met Dr. Diaz for severe RV failure and cor pulmonale.     In summary, patient has history of morbid obesity and uses a wheelchair assistance for mobility.  She has done this for the past many years.  She has sleep apnea and is compliant with CPAP use.  She has been on Bumex for many years.  She has lymphedema in addition to RV failure.     Over the years, patient has had progressive RV dilation and dysfunction on her echocardiogram.  Her PA pressures are close to 70+ RAP, which are also estimated to be very high based on IVC size.  Her LV function is hyperdynamic.  She has mild mitral stenosis.     She was seen in cardiology clinic in April 2024 as a new heart failure visit.  At that time, patient noted some degree of orthopnea, but mainly has severe symptoms of right-sided congestive heart failure.  At that point, she was on 3 mg of Bumex daily.  She is a poor functional status.    She had multiple hospitalizations in 2024.  When she discharged from the hospital on 5/1/2024, her weight was noted to be approximately 304 pounds (vs 330# at previous office visit).  Over the fall and winter, her weight  improved and was under 300#.  Since then, we have gradually titrated up her diuretics to a regimen of Bumex 4 mg twice daily.    Today, she mentions that her swelling and weight are up.  She had a difficult holiday season and did not do well with her diet.  She is consistently only taking Bumex 4 mg daily, not routinely twice a day as prescribed.  She has difficult with BID diuretic regimen with her limited ambulatory status.  Her BNP is improved so there is also likely an element of caloric weight gain over the holidays.  She is conversational on RA.    I had a patricia discussion with Bess that we are at a threshold for volume status today.  I will adjust her regimen and if she does not turn the corner, then we need to discuss infusion clinic vs hospital admission.  I am reassured to see that her breathing is stable and she feels fine overall.  There is no sign of acute distress.  I am noting she is a bit tachycardic today.  Traveling to clinic is exceedingly difficult for her sadly.    Labs reviewed.  BMP stable.  BNP suprisingly improved from prior ~1,700.  __________________________________________________________________         Assessment and Impression:     Severe right-sided heart failure  Critical pulmonary hypertension, chronic  Severe right-sided vascular congestion and renal congestion causing inability to achieve adequate diuresis.  Dietary noncompliance and morbid obesity.  Limited functional capacity -mobility via wheelchair.  Currently on p.o. Bumex to 4 mg twice daily (8 AM + 2 PM) + KCl 20 mEq twice daily.  Chronic lymphedema  NARCISA on CPAP  Multiple sclerosis  Per last hospitalization, patient immobile and does not routinely follow with neurology.  Not on any PTA medications for MS.  She was given a written neurology referral and given progressive respiratory failure of the past 3 to 4 months was recommended to follow-up with them to evaluate if there is any component of neuromuscular weakness  secondary to MS driving her PH.  Morbid obesity, denied Zepbound.  History of pulmonary embolism  History of NSTEMI, on aspirin 81 mg daily.         Recommendations and Plan:     Since patient is only taking her diuretics once daily, I will change her regimen to PO Bumex 6 mg daily in the AM, which will be an increase from what she was taking.  Increase potassium supplement to 30 mEq daily.  Daily weights.  Low sodium diet is necessary.  We have agreed to repeat BMP labs at her next office visit in February.  I have discussed with her that should she experience any racing heart, palpitations, cramping, dizziness or lightheadedness, then she needs to discuss these side effects with us immediately.  Consideration for infusion clinic at follow up if weight remains up.  Pharmacy check and will run PA for GLP-1 coverage.   ANNAMARIE visit in 3-months.  We have agreed to this follow up plan in considering transportation is very challenging for Bess.  She has multiple upcoming visits in the next few months.  In person visits preferred given her status.  __________________________________________________________________    Thank you for the opportunity to participate in this pleasant patient's care.    We would be happy to see this patient sooner for any concerns in the meantime.    JULIETA Anguiano, CNP   Nurse Practitioner  [facility]    Today's clinic visit entailed:  Ordering of each unique test  Prescription drug management  The level of medical decision making during this visit was of moderate complexity.  Review of the result(s) of each unique test - cardiac testing, cardiac imaging, labs  Care everywhere reviewed for additional records to facilitate comprehensive patient care.    Orders this Visit:  Orders Placed This Encounter   Procedures     Basic metabolic panel     Follow-Up with Cardiology- ANNAMARIE     Orders Placed This Encounter   Medications     bumetanide (BUMEX) 2 MG tablet     Sig: Take  3 tablets (6 mg) by mouth daily.     Dispense:  360 tablet     Refill:  1     potassium chloride sheila ER (KLOR-CON M10) 10 MEQ CR tablet     Sig: Take 3 tablets (30 mEq) by mouth daily.     Dispense:  90 tablet     Refill:  3     Medications Discontinued During This Encounter   Medication Reason     potassium chloride sheila ER (KLOR-CON M20) 20 MEQ CR tablet      bumetanide (BUMEX) 2 MG tablet      Encounter Diagnoses   Name Primary?     Chronic heart failure with preserved ejection fraction (H)      Chronic right-sided heart failure (H) Yes     Pulmonary hypertension (H)      NARCISA on CPAP      Obesity hypoventilation syndrome (H)      Chronic diastolic congestive heart failure (H)      Class 3 severe obesity due to excess calories with serious comorbidity and body mass index (BMI) of 50.0 to 59.9 in adult (H)      CURRENT MEDICATIONS:  Current Outpatient Medications   Medication Sig Dispense Refill     acetaminophen (TYLENOL) 650 MG CR tablet Take 650 mg by mouth every 8 hours as needed for mild pain or fever       aspirin (ASPIRIN LOW DOSE) 81 MG EC tablet Take 1 tablet (81 mg) by mouth daily       bumetanide (BUMEX) 2 MG tablet Take 3 tablets (6 mg) by mouth daily. 360 tablet 1     Emollient (GOLD BOND MEDICATED BODY EX) Externally apply 1 Application topically 2 times daily as needed       empagliflozin (JARDIANCE) 10 MG TABS tablet Take 1 tablet (10 mg) by mouth daily 90 tablet 1     escitalopram (LEXAPRO) 10 MG tablet Take 1 tablet (10 mg) by mouth daily. 90 tablet 1     metoprolol succinate ER (TOPROL XL) 25 MG 24 hr tablet Take 0.5 tablets (12.5 mg) by mouth daily       miconazole (MICATIN) 2 % AERP powder Apply topically 2 times daily 128 g 3     potassium chloride sheila ER (KLOR-CON M10) 10 MEQ CR tablet Take 3 tablets (30 mEq) by mouth daily. 90 tablet 3     Pramoxine HCl (VAGISIL ANTI-ITCH MEDICATED EX) Externally apply topically as needed       saccharomyces boulardii (FLORASTOR) 250 MG capsule Take 1  capsule (250 mg) by mouth 2 times daily 60 capsule 0     spironolactone (ALDACTONE) 25 MG tablet Take 0.5 tablets (12.5 mg) by mouth daily. 45 tablet 2     ALLERGIES     Allergies   Allergen Reactions     Nkda [No Known Drug Allergy]      PAST MEDICAL, SURGICAL, FAMILY, SOCIAL HISTORY:  History was reviewed and updated as needed, see medical record.    Review of Systems:  A 10-point Review Of Systems is otherwise normal except for that which is noted in the HPI and interval summary.    Physical Exam:    Vitals: /69   Pulse 103   Wt 140.6 kg (310 lb)   SpO2 (!) 83%   BMI 53.21 kg/m    Constitutional: Appears stated age, obese, NAD.  Respiratory: Non-labored. Lungs clear, diminished posterior fields.  Cardiovascular: RRR, normal S1 and S2. No M/G/R.  Generalized edema.  GI: Soft, non-distended, non-tender.  Musculoskeletal/Extremities: Symmetrical movement.  Neurologic: No gross focal deficits. Alert, awake.  Psychiatric: Affect appropriate. Mentation normal.    Recent Lab Results:  LIPID RESULTS:  Lab Results   Component Value Date    CHOL 93 09/27/2023    CHOL 162 02/11/2016    HDL 28 (L) 09/27/2023    HDL 64 02/11/2016    LDL 42 09/27/2023    LDL 75 02/11/2016    TRIG 116 09/27/2023    TRIG 113 02/11/2016    CHOLHDLRATIO 4.0 11/07/2011     LIVER ENZYME RESULTS:  Lab Results   Component Value Date    AST 16 07/22/2024    AST 18 03/07/2021    ALT 7 07/22/2024    ALT 17 03/07/2021     CBC RESULTS:  Lab Results   Component Value Date    WBC 3.7 (L) 07/23/2024    WBC 7.3 03/07/2021    RBC 4.70 07/23/2024    RBC 4.91 03/07/2021    HGB 13.0 09/11/2024    HGB 13.4 07/23/2024    HGB 12.5 03/07/2021    HCT 42.4 07/23/2024    HCT 42.8 03/07/2021    MCV 90 07/23/2024    MCV 87 03/07/2021    MCH 28.5 07/23/2024    MCH 25.5 (L) 03/07/2021    MCHC 31.6 07/23/2024    MCHC 29.2 (L) 03/07/2021    RDW 18.1 (H) 07/23/2024    RDW 15.6 (H) 03/07/2021     (L) 07/23/2024     03/08/2021     BMP RESULTS:  Lab Results    Component Value Date     01/13/2025     03/07/2021    POTASSIUM 3.7 01/13/2025    POTASSIUM 3.6 09/06/2022    POTASSIUM 4.3 03/07/2021    CHLORIDE 99 01/13/2025    CHLORIDE 98 09/06/2022    CHLORIDE 105 03/07/2021    CO2 26 01/13/2025    CO2 25 09/06/2022    CO2 30 03/07/2021    ANIONGAP 13 01/13/2025    ANIONGAP 11 09/06/2022    ANIONGAP 4 03/07/2021    GLC 90 01/13/2025    GLC 88 07/22/2024    GLC 94 09/06/2022    GLC 99 03/07/2021    BUN 16.0 01/13/2025    BUN 22 09/06/2022    BUN 12 03/07/2021    CR 0.74 01/13/2025    CR 0.62 03/07/2021    GFRESTIMATED >90 01/13/2025    GFRESTIMATED >90 03/07/2021    GFRESTBLACK >90 03/07/2021    EVITA 10.0 01/13/2025    EVITA 9.4 03/07/2021      A1C RESULTS:  Lab Results   Component Value Date    A1C 5.7 (H) 02/13/2020     INR RESULTS:  Lab Results   Component Value Date    INR 1.35 (H) 04/22/2024    INR 1.23 (H) 02/11/2022    INR 0.96 04/19/2013                     Thank you for allowing me to participate in the care of your patient.      Sincerely,     JULIETA Anguiano [facility]  cc:   Fabian Reddy, SELENA  [address] [address]

## 2025-01-14 NOTE — TELEPHONE ENCOUNTER
PA Initiation    Medication: WEGOVY 0.25 MG/0.5ML SC SOAJ  Insurance Company: [employer] - Phone [phone] Fax [phone]  Pharmacy Filling the Rx: [facility][id number] [address] AT [address]  Filling Pharmacy Phone: [phone]  Filling Pharmacy Fax:    Start Date: 1/14/2025

## 2025-01-14 NOTE — TELEPHONE ENCOUNTER
Hello,   This is not a high priority medication/request.   Please note : Urgent requests would be defined as one or more of the following situations:  The patient is at risk for severe side effects if they do not get the medication ASAP  The patient is at risk for admission to the hospital if they do not get the medication ASAP  The patient poses a danger to himself/herself without this medication

## 2025-01-16 NOTE — TELEPHONE ENCOUNTER
Prior Authorization Approval    Medication: WEGOVY 0.25 MG/0.5ML SC SOAJ  Authorization Effective Date: 1/15/2025  Authorization Expiration Date: 1/15/2026  Approved Dose/Quantity: 2/28  Reference #: [id number]   Insurance Company: [employer] - Phone [phone] Fax [phone]  Expected CoPay: $    CoPay Card Available:      Financial Assistance Needed:   Which Pharmacy is filling the prescription: [id number] - [address] AT [address]  Pharmacy Notified: Yes  Patient Notified: Yes

## 2025-01-30 NOTE — TELEPHONE ENCOUNTER
Addended by: LIMA DE GUZMAN on: 1/30/2025 11:45 AM     Modules accepted: Orders     Given her recent severe illness with hospitalization and ICU stay I would recommend that she actually just call 911 and get to the ER if she does not have transportation to come in to be seen.

## 2025-02-04 DIAGNOSIS — K74.60 CIRRHOSIS OF LIVER WITHOUT ASCITES, UNSPECIFIED HEPATIC CIRRHOSIS TYPE (H): Primary | ICD-10-CM

## 2025-02-11 ENCOUNTER — APPOINTMENT (OUTPATIENT)
Dept: CT IMAGING | Facility: HOSPITAL | Age: 50
End: 2025-02-11
Attending: FAMILY MEDICINE
Payer: COMMERCIAL

## 2025-02-11 ENCOUNTER — APPOINTMENT (OUTPATIENT)
Dept: MRI IMAGING | Facility: HOSPITAL | Age: 50
End: 2025-02-11
Attending: STUDENT IN AN ORGANIZED HEALTH CARE EDUCATION/TRAINING PROGRAM
Payer: COMMERCIAL

## 2025-02-11 ENCOUNTER — HOSPITAL ENCOUNTER (INPATIENT)
Facility: HOSPITAL | Age: 50
End: 2025-02-11
Attending: FAMILY MEDICINE
Payer: COMMERCIAL

## 2025-02-11 ENCOUNTER — APPOINTMENT (OUTPATIENT)
Dept: ULTRASOUND IMAGING | Facility: HOSPITAL | Age: 50
End: 2025-02-11
Attending: STUDENT IN AN ORGANIZED HEALTH CARE EDUCATION/TRAINING PROGRAM
Payer: COMMERCIAL

## 2025-02-11 DIAGNOSIS — L85.3 XEROSIS CUTIS: ICD-10-CM

## 2025-02-11 DIAGNOSIS — E66.2 OBESITY HYPOVENTILATION SYNDROME (H): ICD-10-CM

## 2025-02-11 DIAGNOSIS — R53.1 WEAKNESS: ICD-10-CM

## 2025-02-11 DIAGNOSIS — I89.0 LYMPHEDEMA OF BOTH LOWER EXTREMITIES: Primary | ICD-10-CM

## 2025-02-11 DIAGNOSIS — F41.1 GENERALIZED ANXIETY DISORDER: Chronic | ICD-10-CM

## 2025-02-11 DIAGNOSIS — G35 MULTIPLE SCLEROSIS (H): Chronic | ICD-10-CM

## 2025-02-11 DIAGNOSIS — I27.20 PULMONARY HYPERTENSION (H): ICD-10-CM

## 2025-02-11 DIAGNOSIS — K59.01 SLOW TRANSIT CONSTIPATION: ICD-10-CM

## 2025-02-11 DIAGNOSIS — J96.21 ACUTE ON CHRONIC RESPIRATORY FAILURE WITH HYPOXEMIA (H): ICD-10-CM

## 2025-02-11 DIAGNOSIS — E66.01 MORBID OBESITY (H): ICD-10-CM

## 2025-02-11 DIAGNOSIS — I63.9 ISCHEMIC CEREBROVASCULAR ACCIDENT (CVA) (H): ICD-10-CM

## 2025-02-11 DIAGNOSIS — R21 RASH: ICD-10-CM

## 2025-02-11 DIAGNOSIS — I50.9 ACUTE ON CHRONIC CONGESTIVE HEART FAILURE, UNSPECIFIED HEART FAILURE TYPE (H): ICD-10-CM

## 2025-02-11 DIAGNOSIS — E66.01 CLASS 3 SEVERE OBESITY DUE TO EXCESS CALORIES WITH SERIOUS COMORBIDITY AND BODY MASS INDEX (BMI) OF 50.0 TO 59.9 IN ADULT (H): ICD-10-CM

## 2025-02-11 DIAGNOSIS — E66.813 CLASS 3 SEVERE OBESITY DUE TO EXCESS CALORIES WITH SERIOUS COMORBIDITY AND BODY MASS INDEX (BMI) OF 50.0 TO 59.9 IN ADULT (H): ICD-10-CM

## 2025-02-11 DIAGNOSIS — R52 PAIN: ICD-10-CM

## 2025-02-11 DIAGNOSIS — F33.1 MODERATE EPISODE OF RECURRENT MAJOR DEPRESSIVE DISORDER (H): Chronic | ICD-10-CM

## 2025-02-11 DIAGNOSIS — G47.33 OSA ON CPAP: ICD-10-CM

## 2025-02-11 DIAGNOSIS — I50.32 CHRONIC HEART FAILURE WITH PRESERVED EJECTION FRACTION (H): ICD-10-CM

## 2025-02-11 DIAGNOSIS — I89.0 LYMPHEDEMA: ICD-10-CM

## 2025-02-11 LAB
ALBUMIN SERPL BCG-MCNC: 3.2 G/DL (ref 3.5–5.2)
ALBUMIN UR-MCNC: 30 MG/DL
ALP SERPL-CCNC: 74 U/L (ref 40–150)
ALT SERPL W P-5'-P-CCNC: 9 U/L (ref 0–50)
ANION GAP SERPL CALCULATED.3IONS-SCNC: 18 MMOL/L (ref 7–15)
APPEARANCE UR: ABNORMAL
AST SERPL W P-5'-P-CCNC: 37 U/L (ref 0–45)
BASE EXCESS BLDV CALC-SCNC: 0.1 MMOL/L (ref -3–3)
BASOPHILS # BLD AUTO: 0 10E3/UL (ref 0–0.2)
BASOPHILS NFR BLD AUTO: 1 %
BILIRUB DIRECT SERPL-MCNC: 1.3 MG/DL (ref 0–0.3)
BILIRUB SERPL-MCNC: 3.2 MG/DL
BILIRUB UR QL STRIP: NEGATIVE
BUN SERPL-MCNC: 23.7 MG/DL (ref 6–20)
CALCIUM SERPL-MCNC: 10.7 MG/DL (ref 8.8–10.4)
CHLORIDE SERPL-SCNC: 97 MMOL/L (ref 98–107)
CK SERPL-CCNC: 248 U/L (ref 26–192)
COLOR UR AUTO: YELLOW
CREAT SERPL-MCNC: 0.66 MG/DL (ref 0.51–0.95)
EGFRCR SERPLBLD CKD-EPI 2021: >90 ML/MIN/1.73M2
EOSINOPHIL # BLD AUTO: 0 10E3/UL (ref 0–0.7)
EOSINOPHIL NFR BLD AUTO: 0 %
ERYTHROCYTE [DISTWIDTH] IN BLOOD BY AUTOMATED COUNT: 18.7 % (ref 10–15)
FLUAV RNA SPEC QL NAA+PROBE: NEGATIVE
FLUBV RNA RESP QL NAA+PROBE: NEGATIVE
GLUCOSE SERPL-MCNC: 87 MG/DL (ref 70–99)
GLUCOSE UR STRIP-MCNC: NEGATIVE MG/DL
HCO3 BLDV-SCNC: 26 MMOL/L (ref 21–28)
HCO3 SERPL-SCNC: 20 MMOL/L (ref 22–29)
HCT VFR BLD AUTO: 50.5 % (ref 35–47)
HGB BLD-MCNC: 16.3 G/DL (ref 11.7–15.7)
HGB UR QL STRIP: ABNORMAL
HOLD SPECIMEN: NORMAL
IMM GRANULOCYTES # BLD: 0.1 10E3/UL
IMM GRANULOCYTES NFR BLD: 1 %
KETONES UR STRIP-MCNC: 10 MG/DL
LACTATE SERPL-SCNC: 2 MMOL/L (ref 0.7–2)
LACTATE SERPL-SCNC: 2.7 MMOL/L (ref 0.7–2)
LEUKOCYTE ESTERASE UR QL STRIP: ABNORMAL
LIPASE SERPL-CCNC: 33 U/L (ref 13–60)
LYMPHOCYTES # BLD AUTO: 1.2 10E3/UL (ref 0.8–5.3)
LYMPHOCYTES NFR BLD AUTO: 16 %
MAGNESIUM SERPL-MCNC: 2.1 MG/DL (ref 1.7–2.3)
MCH RBC QN AUTO: 27.8 PG (ref 26.5–33)
MCHC RBC AUTO-ENTMCNC: 32.3 G/DL (ref 31.5–36.5)
MCV RBC AUTO: 86 FL (ref 78–100)
MONOCYTES # BLD AUTO: 0.7 10E3/UL (ref 0–1.3)
MONOCYTES NFR BLD AUTO: 9 %
NEUTROPHILS # BLD AUTO: 5.3 10E3/UL (ref 1.6–8.3)
NEUTROPHILS NFR BLD AUTO: 73 %
NITRATE UR QL: POSITIVE
NRBC # BLD AUTO: 0 10E3/UL
NRBC BLD AUTO-RTO: 0 /100
NT-PROBNP SERPL-MCNC: 7880 PG/ML (ref 0–900)
O2/TOTAL GAS SETTING VFR VENT: 28 %
OXYHGB MFR BLDV: 36 % (ref 70–75)
PCO2 BLDV: 43 MM HG (ref 40–50)
PH BLDV: 7.38 [PH] (ref 7.32–7.43)
PH UR STRIP: 5.5 [PH] (ref 5–7)
PHOSPHATE SERPL-MCNC: 3.1 MG/DL (ref 2.5–4.5)
PLATELET # BLD AUTO: 179 10E3/UL (ref 150–450)
PO2 BLDV: 23 MM HG (ref 25–47)
POTASSIUM SERPL-SCNC: 4.6 MMOL/L (ref 3.4–5.3)
PROCALCITONIN SERPL IA-MCNC: 0.73 NG/ML
PROT SERPL-MCNC: 8.1 G/DL (ref 6.4–8.3)
RBC # BLD AUTO: 5.87 10E6/UL (ref 3.8–5.2)
RBC URINE: 22 /HPF
RSV RNA SPEC NAA+PROBE: NEGATIVE
SAO2 % BLDV: 36.2 % (ref 70–75)
SARS-COV-2 RNA RESP QL NAA+PROBE: NEGATIVE
SODIUM SERPL-SCNC: 135 MMOL/L (ref 135–145)
SP GR UR STRIP: 1.01 (ref 1–1.03)
SQUAMOUS EPITHELIAL: 1 /HPF
TROPONIN T SERPL HS-MCNC: 43 NG/L
TROPONIN T SERPL HS-MCNC: 47 NG/L
TROPONIN T SERPL HS-MCNC: 69 NG/L
UROBILINOGEN UR STRIP-MCNC: 4 MG/DL
WBC # BLD AUTO: 7.3 10E3/UL (ref 4–11)
WBC URINE: >182 /HPF

## 2025-02-11 PROCEDURE — 250N000011 HC RX IP 250 OP 636: Performed by: STUDENT IN AN ORGANIZED HEALTH CARE EDUCATION/TRAINING PROGRAM

## 2025-02-11 PROCEDURE — 82248 BILIRUBIN DIRECT: CPT | Performed by: FAMILY MEDICINE

## 2025-02-11 PROCEDURE — 84100 ASSAY OF PHOSPHORUS: CPT | Performed by: STUDENT IN AN ORGANIZED HEALTH CARE EDUCATION/TRAINING PROGRAM

## 2025-02-11 PROCEDURE — 82435 ASSAY OF BLOOD CHLORIDE: CPT | Performed by: FAMILY MEDICINE

## 2025-02-11 PROCEDURE — 83690 ASSAY OF LIPASE: CPT | Performed by: FAMILY MEDICINE

## 2025-02-11 PROCEDURE — 87040 BLOOD CULTURE FOR BACTERIA: CPT | Performed by: FAMILY MEDICINE

## 2025-02-11 PROCEDURE — 82247 BILIRUBIN TOTAL: CPT | Performed by: FAMILY MEDICINE

## 2025-02-11 PROCEDURE — 70450 CT HEAD/BRAIN W/O DYE: CPT

## 2025-02-11 PROCEDURE — 120N000001 HC R&B MED SURG/OB

## 2025-02-11 PROCEDURE — 250N000011 HC RX IP 250 OP 636: Performed by: FAMILY MEDICINE

## 2025-02-11 PROCEDURE — 85004 AUTOMATED DIFF WBC COUNT: CPT | Performed by: FAMILY MEDICINE

## 2025-02-11 PROCEDURE — 81001 URINALYSIS AUTO W/SCOPE: CPT | Performed by: FAMILY MEDICINE

## 2025-02-11 PROCEDURE — 71260 CT THORAX DX C+: CPT

## 2025-02-11 PROCEDURE — 36415 COLL VENOUS BLD VENIPUNCTURE: CPT | Performed by: STUDENT IN AN ORGANIZED HEALTH CARE EDUCATION/TRAINING PROGRAM

## 2025-02-11 PROCEDURE — 82805 BLOOD GASES W/O2 SATURATION: CPT | Performed by: FAMILY MEDICINE

## 2025-02-11 PROCEDURE — 76705 ECHO EXAM OF ABDOMEN: CPT

## 2025-02-11 PROCEDURE — 83735 ASSAY OF MAGNESIUM: CPT | Performed by: FAMILY MEDICINE

## 2025-02-11 PROCEDURE — 87186 SC STD MICRODIL/AGAR DIL: CPT | Performed by: FAMILY MEDICINE

## 2025-02-11 PROCEDURE — 83605 ASSAY OF LACTIC ACID: CPT | Performed by: FAMILY MEDICINE

## 2025-02-11 PROCEDURE — 84145 PROCALCITONIN (PCT): CPT | Performed by: FAMILY MEDICINE

## 2025-02-11 PROCEDURE — 96360 HYDRATION IV INFUSION INIT: CPT | Mod: 59

## 2025-02-11 PROCEDURE — 255N000002 HC RX 255 OP 636: Performed by: STUDENT IN AN ORGANIZED HEALTH CARE EDUCATION/TRAINING PROGRAM

## 2025-02-11 PROCEDURE — 84484 ASSAY OF TROPONIN QUANT: CPT | Performed by: STUDENT IN AN ORGANIZED HEALTH CARE EDUCATION/TRAINING PROGRAM

## 2025-02-11 PROCEDURE — 84484 ASSAY OF TROPONIN QUANT: CPT | Performed by: FAMILY MEDICINE

## 2025-02-11 PROCEDURE — 93005 ELECTROCARDIOGRAM TRACING: CPT | Performed by: FAMILY MEDICINE

## 2025-02-11 PROCEDURE — 258N000003 HC RX IP 258 OP 636: Performed by: FAMILY MEDICINE

## 2025-02-11 PROCEDURE — 70553 MRI BRAIN STEM W/O & W/DYE: CPT

## 2025-02-11 PROCEDURE — 82565 ASSAY OF CREATININE: CPT | Performed by: FAMILY MEDICINE

## 2025-02-11 PROCEDURE — A9585 GADOBUTROL INJECTION: HCPCS | Performed by: STUDENT IN AN ORGANIZED HEALTH CARE EDUCATION/TRAINING PROGRAM

## 2025-02-11 PROCEDURE — 36415 COLL VENOUS BLD VENIPUNCTURE: CPT | Performed by: FAMILY MEDICINE

## 2025-02-11 PROCEDURE — 85049 AUTOMATED PLATELET COUNT: CPT | Performed by: FAMILY MEDICINE

## 2025-02-11 PROCEDURE — 87637 SARSCOV2&INF A&B&RSV AMP PRB: CPT | Performed by: FAMILY MEDICINE

## 2025-02-11 PROCEDURE — 99291 CRITICAL CARE FIRST HOUR: CPT | Mod: 25

## 2025-02-11 PROCEDURE — 83880 ASSAY OF NATRIURETIC PEPTIDE: CPT | Performed by: FAMILY MEDICINE

## 2025-02-11 PROCEDURE — 250N000013 HC RX MED GY IP 250 OP 250 PS 637: Performed by: STUDENT IN AN ORGANIZED HEALTH CARE EDUCATION/TRAINING PROGRAM

## 2025-02-11 PROCEDURE — 99223 1ST HOSP IP/OBS HIGH 75: CPT | Mod: AI | Performed by: STUDENT IN AN ORGANIZED HEALTH CARE EDUCATION/TRAINING PROGRAM

## 2025-02-11 PROCEDURE — 82550 ASSAY OF CK (CPK): CPT | Performed by: STUDENT IN AN ORGANIZED HEALTH CARE EDUCATION/TRAINING PROGRAM

## 2025-02-11 PROCEDURE — P9047 ALBUMIN (HUMAN), 25%, 50ML: HCPCS | Performed by: STUDENT IN AN ORGANIZED HEALTH CARE EDUCATION/TRAINING PROGRAM

## 2025-02-11 RX ORDER — AMOXICILLIN 250 MG
2 CAPSULE ORAL 2 TIMES DAILY PRN
Status: DISCONTINUED | OUTPATIENT
Start: 2025-02-11 | End: 2025-02-18 | Stop reason: HOSPADM

## 2025-02-11 RX ORDER — HYDROMORPHONE HCL IN WATER/PF 6 MG/30 ML
0.4 PATIENT CONTROLLED ANALGESIA SYRINGE INTRAVENOUS
Status: DISCONTINUED | OUTPATIENT
Start: 2025-02-11 | End: 2025-02-18 | Stop reason: HOSPADM

## 2025-02-11 RX ORDER — ESCITALOPRAM OXALATE 5 MG/1
10 TABLET ORAL DAILY
Status: DISCONTINUED | OUTPATIENT
Start: 2025-02-11 | End: 2025-02-18 | Stop reason: HOSPADM

## 2025-02-11 RX ORDER — AMOXICILLIN 250 MG
1 CAPSULE ORAL 2 TIMES DAILY PRN
Status: DISCONTINUED | OUTPATIENT
Start: 2025-02-11 | End: 2025-02-18 | Stop reason: HOSPADM

## 2025-02-11 RX ORDER — CALCIUM CARBONATE 500 MG/1
1000 TABLET, CHEWABLE ORAL 4 TIMES DAILY PRN
Status: DISCONTINUED | OUTPATIENT
Start: 2025-02-11 | End: 2025-02-18 | Stop reason: HOSPADM

## 2025-02-11 RX ORDER — HYDROMORPHONE HCL IN WATER/PF 6 MG/30 ML
0.2 PATIENT CONTROLLED ANALGESIA SYRINGE INTRAVENOUS
Status: DISCONTINUED | OUTPATIENT
Start: 2025-02-11 | End: 2025-02-18 | Stop reason: HOSPADM

## 2025-02-11 RX ORDER — GADOBUTROL 604.72 MG/ML
15 INJECTION INTRAVENOUS ONCE
Status: COMPLETED | OUTPATIENT
Start: 2025-02-11 | End: 2025-02-11

## 2025-02-11 RX ORDER — BUMETANIDE 2 MG/1
6 TABLET ORAL DAILY
Status: DISCONTINUED | OUTPATIENT
Start: 2025-02-11 | End: 2025-02-14

## 2025-02-11 RX ORDER — ACETAMINOPHEN 650 MG/1
650 SUPPOSITORY RECTAL EVERY 4 HOURS PRN
Status: DISCONTINUED | OUTPATIENT
Start: 2025-02-11 | End: 2025-02-18 | Stop reason: HOSPADM

## 2025-02-11 RX ORDER — CEFTRIAXONE 1 G/1
1 INJECTION, POWDER, FOR SOLUTION INTRAMUSCULAR; INTRAVENOUS EVERY 24 HOURS
Status: COMPLETED | OUTPATIENT
Start: 2025-02-12 | End: 2025-02-15

## 2025-02-11 RX ORDER — OXYCODONE HYDROCHLORIDE 5 MG/1
5 TABLET ORAL EVERY 4 HOURS PRN
Status: DISCONTINUED | OUTPATIENT
Start: 2025-02-11 | End: 2025-02-18 | Stop reason: HOSPADM

## 2025-02-11 RX ORDER — IOPAMIDOL 755 MG/ML
90 INJECTION, SOLUTION INTRAVASCULAR ONCE
Status: COMPLETED | OUTPATIENT
Start: 2025-02-11 | End: 2025-02-11

## 2025-02-11 RX ORDER — NALOXONE HYDROCHLORIDE 0.4 MG/ML
0.4 INJECTION, SOLUTION INTRAMUSCULAR; INTRAVENOUS; SUBCUTANEOUS
Status: DISCONTINUED | OUTPATIENT
Start: 2025-02-11 | End: 2025-02-18 | Stop reason: HOSPADM

## 2025-02-11 RX ORDER — ACETAMINOPHEN 325 MG/1
650 TABLET ORAL EVERY 4 HOURS PRN
Status: DISCONTINUED | OUTPATIENT
Start: 2025-02-11 | End: 2025-02-18 | Stop reason: HOSPADM

## 2025-02-11 RX ORDER — ALBUMIN (HUMAN) 12.5 G/50ML
50 SOLUTION INTRAVENOUS ONCE
Status: COMPLETED | OUTPATIENT
Start: 2025-02-11 | End: 2025-02-11

## 2025-02-11 RX ORDER — CEFTRIAXONE 2 G/1
2 INJECTION, POWDER, FOR SOLUTION INTRAMUSCULAR; INTRAVENOUS ONCE
Status: COMPLETED | OUTPATIENT
Start: 2025-02-11 | End: 2025-02-11

## 2025-02-11 RX ORDER — NALOXONE HYDROCHLORIDE 0.4 MG/ML
0.2 INJECTION, SOLUTION INTRAMUSCULAR; INTRAVENOUS; SUBCUTANEOUS
Status: DISCONTINUED | OUTPATIENT
Start: 2025-02-11 | End: 2025-02-18 | Stop reason: HOSPADM

## 2025-02-11 RX ORDER — SPIRONOLACTONE 25 MG
12.5 TABLET ORAL DAILY
Status: DISCONTINUED | OUTPATIENT
Start: 2025-02-11 | End: 2025-02-18 | Stop reason: HOSPADM

## 2025-02-11 RX ORDER — ONDANSETRON 4 MG/1
4 TABLET, ORALLY DISINTEGRATING ORAL EVERY 6 HOURS PRN
Status: DISCONTINUED | OUTPATIENT
Start: 2025-02-11 | End: 2025-02-18 | Stop reason: HOSPADM

## 2025-02-11 RX ORDER — CEFTRIAXONE 1 G/1
1 INJECTION, POWDER, FOR SOLUTION INTRAMUSCULAR; INTRAVENOUS EVERY 24 HOURS
Status: DISCONTINUED | OUTPATIENT
Start: 2025-02-11 | End: 2025-02-11

## 2025-02-11 RX ORDER — LIDOCAINE 40 MG/G
CREAM TOPICAL
Status: DISCONTINUED | OUTPATIENT
Start: 2025-02-11 | End: 2025-02-15

## 2025-02-11 RX ORDER — ONDANSETRON 2 MG/ML
4 INJECTION INTRAMUSCULAR; INTRAVENOUS EVERY 6 HOURS PRN
Status: DISCONTINUED | OUTPATIENT
Start: 2025-02-11 | End: 2025-02-18 | Stop reason: HOSPADM

## 2025-02-11 RX ORDER — ASPIRIN 81 MG/1
81 TABLET ORAL DAILY
Status: DISCONTINUED | OUTPATIENT
Start: 2025-02-11 | End: 2025-02-18 | Stop reason: HOSPADM

## 2025-02-11 RX ADMIN — IOPAMIDOL 90 ML: 755 INJECTION, SOLUTION INTRAVENOUS at 16:02

## 2025-02-11 RX ADMIN — CEFTRIAXONE SODIUM 2 G: 2 INJECTION, POWDER, FOR SOLUTION INTRAMUSCULAR; INTRAVENOUS at 21:05

## 2025-02-11 RX ADMIN — ALBUMIN HUMAN 50 G: 0.25 SOLUTION INTRAVENOUS at 21:38

## 2025-02-11 RX ADMIN — SODIUM CHLORIDE 1000 ML: 0.9 INJECTION, SOLUTION INTRAVENOUS at 16:22

## 2025-02-11 RX ADMIN — ASPIRIN 81 MG: 81 TABLET, COATED ORAL at 21:06

## 2025-02-11 RX ADMIN — ESCITALOPRAM 10 MG: 5 TABLET, FILM COATED ORAL at 23:48

## 2025-02-11 RX ADMIN — GADOBUTROL 15 ML: 604.72 INJECTION INTRAVENOUS at 20:14

## 2025-02-11 RX ADMIN — BUMETANIDE 6 MG: 2 TABLET ORAL at 21:06

## 2025-02-11 RX ADMIN — SPIRONOLACTONE 12.5 MG: 25 TABLET, FILM COATED ORAL at 23:48

## 2025-02-11 ASSESSMENT — ACTIVITIES OF DAILY LIVING (ADL)
ADLS_ACUITY_SCORE: 61
ADLS_ACUITY_SCORE: 63
ADLS_ACUITY_SCORE: 61
ADLS_ACUITY_SCORE: 63
ADLS_ACUITY_SCORE: 61
ADLS_ACUITY_SCORE: 63
ADLS_ACUITY_SCORE: 63
DEPENDENT_IADLS:: CLEANING;LAUNDRY;TRANSPORTATION
ADLS_ACUITY_SCORE: 63
ADLS_ACUITY_SCORE: 63
ADLS_ACUITY_SCORE: 61

## 2025-02-11 NOTE — ED NOTES
Bed: JNED-26  Expected date:   Expected time:   Means of arrival:   Comments:  [facility]- 50yo F fal, non-ambulatory, did not hit head, no thinners

## 2025-02-11 NOTE — ED PROVIDER NOTES
EMERGENCY DEPARTMENT ENCOUNTER      NAME: Bess Enamorado  AGE: 49 year old female  YOB: 1975  MRN: [id number]  EVALUATION DATE & TIME: 2/11/2025  1:39 PM    PCP: Mikala Luna    ED PROVIDER: Elias Moyer M.D.    Chief Complaint   Patient presents with    Fall       FINAL IMPRESSION:  1. Multiple sclerosis (H)    2. Class 3 severe obesity due to excess calories with serious comorbidity and body mass index (BMI) of 50.0 to 59.9 in adult (H)    3. NARCISA on CPAP    4. Chronic heart failure with preserved ejection fraction (H)    5. Weakness    6. Lymphedema        ED COURSE & MEDICAL DECISION MAKING:    Pertinent Labs & Imaging studies independently interpreted by me. (See chart for details)  Reviewed most recent cardiology office visit from January 2025 which was for chronic heart failure, pulmonary hypertension, obesity hypoventilation syndrome, sounds like she has previously had difficulty achieving diuresis due to right-sided vascular congestion and renal congestion, continued Bumex 4 mg twice a day.    ED Course as of 02/11/25 1737   Tue Feb 11, 2025   1408 Patient seen and examined, presents today with generalized weakness.  Patient reports her left leg gave out yesterday, she was on the floor overnight and a cleaning lady found her.,  Patient has a history of MS and says her left side is generally more affected.  Also notes recent febrile illness with bodyaches, fever, slight cough, no nausea or vomiting.  Had some diarrhea 2 weeks ago which she attributed to starting Wegovy.  On exam here, patient is awake alert, pleasant and interactive.  Marked bilateral lower extremity edema worse on the left which patient says is stable for her.  Trace crackles in the lungs bilaterally and slightly low oxygen saturation, patient reports a history of heart.  Labs and CT scan of the chest are ordered, CT scan was ordered as well.   1526 Labs ordered and independently interpreted by me with slightly  elevated bilirubin, normal creatinine and, slightly elevated procalcitonin but nondiagnostic, elevated BNP at 7880.  CT scan of the chest and head still pending.   1605 CT scan of the head independently interpreted by me does not demonstrate any acute intracranial traumatic findings.   1605 CT scan of the chest independently interpreted by me without evidence of central pulmonary embolism, no infiltrate or effusion.   1642 Repeat lactate improved to 2 after minimal fluid bolus.   1652 Repeat troponin 47, nondiagnostic delta.   1707 Reviewed radiology interpretation of the CT scan which shows dilated right ventricle and findings of pulmonary hypertension.   1718 Patient rechecked and updated, still on low-flow oxygen but no other findings.  Has not yet provided urine sample, will plan to admit.   1736 Care discussed with Dr. Gondal, hospitalist who accepts patient, requests full inpatient admission.         At the conclusion of the encounter I discussed the results of all of the tests and the disposition. The questions were answered. The patient or family acknowledged understanding and was agreeable with the care plan.     Medical Decision Making  Obtained supplemental history:Supplemental history obtained?: No  Reviewed external records: External records reviewed?: Documented in chart  Care impacted by chronic illness:Heart Disease, Hypertension, and Other: Multiple sclerosis  Did you consider but not order tests?: Work up considered but not performed and documented in chart, if applicable  Did you interpret images independently?: Independent interpretation of ECG and images noted in documentation, when applicable.  Consultation discussion with other provider:Did you involve another provider (consultant, , pharmacy, etc.)?: I discussed the care with another health care provider, see documentation for details.  Admit.    MIPS: Not Applicable    MEDICATIONS GIVEN IN THE EMERGENCY:  Medications   sodium chloride 0.9%  "BOLUS 1,000 mL (1,000 mLs Intravenous $New Bag 2/11/25 1622)   iopamidol (ISOVUE-370) solution 90 mL (90 mLs Intravenous $Given 2/11/25 1602)       NEW PRESCRIPTIONS STARTED AT TODAY'S ER VISIT  New Prescriptions    No medications on file       =================================================================    HPI    Patient information was obtained from: patient      Bess Enamorado is a 49 year old female with a pertinent history of MS, hypertension, and CHF who presents to this ED by ambulance for evaluation of potential injuries after a fall.    Per patient, she fell yesterday (02/10/2025) and was unable to get up. She said her left leg \"wasn't doing what she wanted it to do.\" Thus, she was found today (02/11/2025) by her cleaning person at her home (currently she lives in a [facility] house). She did note that before the fall she had been sick for five days with fever (101 F), a light cough, and fatigue. She does have MS which is worse in her left side, heart failure, lung issues (exact condition unspecified), and also noted that she has very bad lymphedema in both her legs. Recently she was started on wegovy. The first week after starting on it she had bowel issues and the second week (week leading up to fall) she felt ill.    She did not hit her head or pass out upon falling yesterday. She has no pain anywhere and is instead just sore/stiff from being on the floor all night. She has had no chills. She is not on blood thinners. No daily medications were mentioned.      REVIEW OF SYSTEMS   Review of Systems   All systems reviewed are negative unless noted positive in the HPI. Pertinent negatives are also noted in the HPI.     PAST MEDICAL HISTORY:  Past Medical History:   Diagnosis Date    Acute on chronic diastolic congestive heart failure (H) 02/09/2022    Arthritis 2019    Hips    ASCUS favor benign 08/2015    Neg HPV    Depressive disorder 2010    H/O colposcopy with cervical biopsy 09/14/2015    Bx & " ECC - negative    Hypertension 2019    LSIL on Pap smear 07/2014    Neg high risk HPV    Multiple sclerosis (H) 08/29/2005 9/18/200pt dx with MS 2004--dx by neurolgist and is followed by Dr. Harris    Myocardial infarction (H)     Obese     Pneumonia due to infectious organism, unspecified laterality, unspecified part of lung 02/08/2022    Sepsis (Temp 101, , WBC 13.0) 10/23/2018    Shingles 10/2016    SIRS (systemic inflammatory response syndrome) (H) 03/04/2021    Sleep apnea     UNSPEC CONSTIPATION 09/18/2006 9/18/2006   Has tried otc suppository and otc lax.        PAST SURGICAL HISTORY:  Past Surgical History:   Procedure Laterality Date    CHOLECYSTECTOMY, LAPOROSCOPIC      Cholecystectomy, Laparoscopic    COLONOSCOPY  2007?    Bathroom issue..results normal    COMBINED CYSTOSCOPY, RETROGRADES, EXCHANGE STENT URETER(S) Right 2/21/2022    Procedure: CYSTOSCOPY, WITH right RETROGRADE PYELOGRAM AND right URETERAL STENT PLACEMENT;  Surgeon: Doyle Palomares MD;  Location[place] [facility] [place]    OPEN REDUCTION INTERNAL FIXATION TOE(S)  4/13/2012    Procedure:OPEN REDUCTION INTERNAL FIXATION TOE(S); Open reduction internal fixation right proximal fifth metatarsal fracture-   Anes-choice block; Surgeon:LEY, JEFFREY DUANE; Location:[place] OR    PICC SINGLE LUMEN PLACEMENT  3/20/2024    PICC TRIPLE LUMEN PLACEMENT  2/21/2022            CURRENT MEDICATIONS:    No current facility-administered medications for this encounter.     Current Outpatient Medications   Medication Sig Dispense Refill    acetaminophen (TYLENOL) 650 MG CR tablet Take 650 mg by mouth every 8 hours as needed for mild pain or fever      aspirin (ASPIRIN LOW DOSE) 81 MG EC tablet Take 1 tablet (81 mg) by mouth daily      bumetanide (BUMEX) 2 MG tablet Take 3 tablets (6 mg) by mouth daily. 360 tablet 1    Emollient (GOLD BOND MEDICATED BODY EX) Externally apply 1 Application topically 2 times daily as needed      empagliflozin (JARDIANCE) 10 MG  TABS tablet Take 1 tablet (10 mg) by mouth daily 90 tablet 1    escitalopram (LEXAPRO) 10 MG tablet Take 1 tablet (10 mg) by mouth daily. 90 tablet 1    metoprolol succinate ER (TOPROL XL) 25 MG 24 hr tablet Take 0.5 tablets (12.5 mg) by mouth daily      miconazole (MICATIN) 2 % AERP powder Apply topically 2 times daily 128 g 3    potassium chloride sheila ER (KLOR-CON M10) 10 MEQ CR tablet Take 3 tablets (30 mEq) by mouth daily. 90 tablet 3    Pramoxine HCl (VAGISIL ANTI-ITCH MEDICATED EX) Externally apply topically as needed      saccharomyces boulardii (FLORASTOR) 250 MG capsule Take 1 capsule (250 mg) by mouth 2 times daily 60 capsule 0    semaglutide-weight management (WEGOVY) 0.25 MG/0.5ML pen Inject 0.5 mLs (0.25 mg) subcutaneously once a week. 2 mL 1    spironolactone (ALDACTONE) 25 MG tablet Take 0.5 tablets (12.5 mg) by mouth daily. 45 tablet 2       ALLERGIES:  Allergies   Allergen Reactions    Nkda [No Known Drug Allergy]        FAMILY HISTORY:  Family History   Problem Relation Age of Onset    Neurologic Disorder Father         MS    Heart Disease Father         irregular heart rate    Diabetes Father     Melanoma Father     Sleep Apnea Father     Other Cancer Father     Cancer Maternal Grandfather         lung    Other Cancer Maternal Grandfather     Cancer Paternal Grandmother         stomach    Other Cancer Paternal Grandmother     Cancer Mother     Other Cancer Mother     Thyroid Disease Mother     Gynecology Maternal Aunt         PCOS    Hypertension Maternal Grandmother     Anxiety Disorder Maternal Grandmother     Thyroid Disease Maternal Grandmother     Anxiety Disorder Sister        SOCIAL HISTORY:   Social History     Socioeconomic History    Marital status: Single   Occupational History     Employer: [employer]   Tobacco Use    Smoking status: Never    Smokeless tobacco: Never   Vaping Use    Vaping status: Never Used   Substance and Sexual Activity    Alcohol use: Yes     Comment: social     Drug use: No    Sexual activity: Not Currently     Partners: Male     Birth control/protection: Pill   Other Topics Concern    Parent/sibling w/ CABG, MI or angioplasty before 65F 55M? No   Social History Narrative    , 3rd-5th grade     Social Drivers of Health     Financial Resource Strain: Low Risk  (9/25/2023)    Financial Resource Strain     Within the past 12 months, have you or your family members you live with been unable to get utilities (heat, electricity) when it was really needed?: No   Food Insecurity: Low Risk  (3/21/2024)    Food Insecurity     Within the past 12 months, did you worry that your food would run out before you got money to buy more?: No     Within the past 12 months, did the food you bought just not last and you didn t have money to get more?: No   Transportation Needs: Low Risk  (3/21/2024)    Transportation Needs     Within the past 12 months, has lack of transportation kept you from medical appointments, getting your medicines, non-medical meetings or appointments, work, or from getting things that you need?: No   Physical Activity: Inactive (3/21/2024)    Exercise Vital Sign     Days of Exercise per Week: 0 days     Minutes of Exercise per Session: 0 min   Stress: No Stress Concern Present (12/4/2020)    [employer] - Occupational Stress Questionnaire     Feeling of Stress : Only a little   Social Connections: Unknown (3/1/2022)    Social Connection and Isolation Panel [NHANES]     Frequency of Communication with Friends and Family: Twice a week     Frequency of Social Gatherings with Friends and Family: Twice a week   Interpersonal Safety: Low Risk  (9/27/2023)    Interpersonal Safety     Do you feel physically and emotionally safe where you currently live?: Yes     Within the past 12 months, have you been hit, slapped, kicked or otherwise physically hurt by someone?: No     Within the past 12 months, have you been humiliated  "or emotionally abused in other ways by your partner or ex-partner?: No   Housing Stability: Low Risk  (3/21/2024)    Housing Stability     Do you have housing? : Yes     Are you worried about losing your housing?: No       VITALS:  /66   Pulse 87   Temp 97.4  F (36.3  C) (Oral)   Resp 18   Ht 1.626 m (5' 4\")   Wt (!) 149.7 kg (330 lb)   SpO2 96%   BMI 56.64 kg/m      PHYSICAL EXAM:  Physical Exam  Vitals and nursing note reviewed.   Constitutional:       Appearance: Normal appearance.   HENT:      Head: Normocephalic and atraumatic.      Right Ear: External ear normal.      Left Ear: External ear normal.      Nose: Nose normal.      Mouth/Throat:      Mouth: Mucous membranes are moist.   Eyes:      Extraocular Movements: Extraocular movements intact.      Conjunctiva/sclera: Conjunctivae normal.      Pupils: Pupils are equal, round, and reactive to light.   Cardiovascular:      Rate and Rhythm: Normal rate and regular rhythm.      Comments: Normal capillary refill  Pulmonary:      Effort: Pulmonary effort is normal.      Breath sounds: No wheezing or rales.      Comments: Trace crackles bilaterally  Abdominal:      General: Abdomen is flat. There is no distension.      Palpations: Abdomen is soft.      Tenderness: There is no abdominal tenderness. There is no guarding.   Musculoskeletal:         General: Normal range of motion.      Cervical back: Normal range of motion and neck supple.      Right lower leg: Edema (Marked) present.      Left lower leg: Edema (Marked) present.      Comments: Edema worse on left side   Lymphadenopathy:      Cervical: No cervical adenopathy.   Skin:     General: Skin is warm and dry.      Comments: Purple discoloration of fingers   Neurological:      General: No focal deficit present.      Mental Status: She is alert and oriented to person, place, and time. Mental status is at baseline.      Comments: No gross focal neurologic deficits   Psychiatric:         Mood and " Affect: Mood normal.         Behavior: Behavior normal.         Thought Content: Thought content normal.          LAB:  All pertinent labs reviewed and interpreted.  Results for orders placed or performed during the hospital encounter of 02/11/25   CT Chest w Contrast    Impression    IMPRESSION:     1.  Severely enlarged main pulmonary artery which is associated with pulmonary hypertension. No findings to suggest acute or chronic pulmonary embolism.  2.  Dilated right ventricle, likely secondary to #1.  3.  No acute lung parenchymal or pleural space process.     Head CT w/o contrast    Impression    IMPRESSION:  1.  No acute intracranial hemorrhage, mass effect, or hydrocephalus.  2.  A few small nonspecific hypoattenuating lesions in the right cerebellar hemisphere, not definitively present on the previous exam. These raise concern for age-indeterminate infarcts, possibly chronic.  3.  Right occipital/suboccipital scalp contusion without underlying displaced calvarial fracture.     Extra Blue Top Tube   Result Value Ref Range    Hold Specimen JIC    Extra Green Top (Lithium Heparin) Tube   Result Value Ref Range    Hold Specimen JIC    Extra Purple Top Tube   Result Value Ref Range    Hold Specimen JIC    Extra Green Top (Lithium Heparin) ON ICE   Result Value Ref Range    Hold Specimen JIC    Basic metabolic panel   Result Value Ref Range    Sodium 135 135 - 145 mmol/L    Potassium 4.6 3.4 - 5.3 mmol/L    Chloride 97 (L) 98 - 107 mmol/L    Carbon Dioxide (CO2) 20 (L) 22 - 29 mmol/L    Anion Gap 18 (H) 7 - 15 mmol/L    Urea Nitrogen 23.7 (H) 6.0 - 20.0 mg/dL    Creatinine 0.66 0.51 - 0.95 mg/dL    GFR Estimate >90 >60 mL/min/1.73m2    Calcium 10.7 (H) 8.8 - 10.4 mg/dL    Glucose 87 70 - 99 mg/dL   Hepatic function panel   Result Value Ref Range    Protein Total 8.1 6.4 - 8.3 g/dL    Albumin 3.2 (L) 3.5 - 5.2 g/dL    Bilirubin Total 3.2 (H) <=1.2 mg/dL    Alkaline Phosphatase 74 40 - 150 U/L    AST 37 0 - 45 U/L     ALT 9 0 - 50 U/L    Bilirubin Direct 1.30 (H) 0.00 - 0.30 mg/dL   Result Value Ref Range    Lipase 33 13 - 60 U/L   Result Value Ref Range    Troponin T, High Sensitivity 43 (H) <=14 ng/L   Result Value Ref Range    Magnesium 2.1 1.7 - 2.3 mg/dL   Result Value Ref Range    Procalcitonin 0.73 (H) <0.50 ng/mL   Lactic acid whole blood with 1x repeat in 2 hr when >2   Result Value Ref Range    Lactic Acid, Initial 2.7 (H) 0.7 - 2.0 mmol/L   Blood gas venous   Result Value Ref Range    pH Venous 7.38 7.32 - 7.43    pCO2 Venous 43 40 - 50 mm Hg    pO2 Venous 23 (L) 25 - 47 mm Hg    Bicarbonate Venous 26 21 - 28 mmol/L    Base Excess/Deficit Venous 0.1 -3.0 - 3.0 mmol/L    FIO2 28     Oxyhemoglobin Venous 36 (L) 70 - 75 %    O2 Sat, Venous 36.2 (L) 70.0 - 75.0 %   Nt probnp inpatient (BNP)   Result Value Ref Range    N terminal Pro BNP Inpatient 7,880 (H) 0 - 900 pg/mL   Influenza A/B, RSV and SARS-CoV2 PCR (COVID-19) Nose    Specimen: Nose; Swab   Result Value Ref Range    Influenza A PCR Negative Negative    Influenza B PCR Negative Negative    RSV PCR Negative Negative    SARS CoV2 PCR Negative Negative   CBC with platelets and differential   Result Value Ref Range    WBC Count 7.3 4.0 - 11.0 10e3/uL    RBC Count 5.87 (H) 3.80 - 5.20 10e6/uL    Hemoglobin 16.3 (H) 11.7 - 15.7 g/dL    Hematocrit 50.5 (H) 35.0 - 47.0 %    MCV 86 78 - 100 fL    MCH 27.8 26.5 - 33.0 pg    MCHC 32.3 31.5 - 36.5 g/dL    RDW 18.7 (H) 10.0 - 15.0 %    Platelet Count 179 150 - 450 10e3/uL    % Neutrophils 73 %    % Lymphocytes 16 %    % Monocytes 9 %    % Eosinophils 0 %    % Basophils 1 %    % Immature Granulocytes 1 %    NRBCs per 100 WBC 0 <1 /100    Absolute Neutrophils 5.3 1.6 - 8.3 10e3/uL    Absolute Lymphocytes 1.2 0.8 - 5.3 10e3/uL    Absolute Monocytes 0.7 0.0 - 1.3 10e3/uL    Absolute Eosinophils 0.0 0.0 - 0.7 10e3/uL    Absolute Basophils 0.0 0.0 - 0.2 10e3/uL    Absolute Immature Granulocytes 0.1 <=0.4 10e3/uL    Absolute NRBCs 0.0  10e3/uL   Lactic acid whole blood   Result Value Ref Range    Lactic Acid 2.0 0.7 - 2.0 mmol/L   Result Value Ref Range    Troponin T, High Sensitivity 47 (H) <=14 ng/L       RADIOLOGY:  Reviewed all pertinent imaging. Please see official radiology report.  CT Chest w Contrast   Final Result   IMPRESSION:       1.  Severely enlarged main pulmonary artery which is associated with pulmonary hypertension. No findings to suggest acute or chronic pulmonary embolism.   2.  Dilated right ventricle, likely secondary to #1.   3.  No acute lung parenchymal or pleural space process.         Head CT w/o contrast   Final Result   IMPRESSION:   1.  No acute intracranial hemorrhage, mass effect, or hydrocephalus.   2.  A few small nonspecific hypoattenuating lesions in the right cerebellar hemisphere, not definitively present on the previous exam. These raise concern for age-indeterminate infarcts, possibly chronic.   3.  Right occipital/suboccipital scalp contusion without underlying displaced calvarial fracture.         US Abdomen Limited    (Results Pending)       I, Abdifatah Harding, am serving as a scribe to document services personally performed by Dr. Moyer based on my observation and the provider's statements to me. I, Elias Moyer MD attest that Abdifatah Harding is acting in a scribe capacity, has observed my performance of the services and has documented them in accordance with my direction.    Elias Moyer M.D.  Emergency Medicine  [facility] EMERGENCY DEPARTMENT  [address] [facility] [address]  [phone]  Dept: [phone]      Elias Moyer MD  02/11/25 4811

## 2025-02-11 NOTE — H&P
[facility]    History and Physical - Hospitalist Service       Date of Admission:  2/11/2025    Assessment & Plan    Assessment:  Bess Enamorado is a 49 year old female with a past medical history of multiple sclerosis, hypertension, CHF presented to the ED by ambulance for evaluation post fall,  CT scan of the chest was negative for acute process, CT scan of the head was also performed which showed no acute process but showed  A few small nonspecific hypoattenuating lesions in the right cerebellar hemisphere, not definitively present on the previous exam. These raise concern for age-indeterminate infarcts, possibly chronic. Right occipital/suboccipital scalp contusion without underlying displaced calvarial fracture.  Patient is going to be admitted post fall, PT and OT evaluation, possible placement and to rule out MS flare versus other CNS pathology.      Problem list and plan:  Status post fall  Mild rhabdomyolysis secondary to fall  Generalized weakness most likely multifactorial  Patient presented to the ED by ambulance for evaluation post fall  Per patient he fell yesterday and was unable to get up, she stated that her left leg was not doing what she wanted to do and eventually had a fall  She was found this morning by the cleaning lady  She did noted that prior to the fall she was sick for 5 days with a fever and slight cough along with fatigue  Has a history of multiple sclerosis which is worse in her left side  Recently started on Wegovy  Patient denied hitting her head or passing out upon falling  Denied any pain but feeling stiffness from being on the floor all night  CK was mildly elevated 248  CT scan of the head was also performed which showed no acute process but showed A few small nonspecific hypoattenuating lesions in the right cerebellar hemisphere, not definitively present on the previous exam. These raise concern for age-indeterminate infarcts, possibly chronic. Right  occipital/suboccipital scalp contusion without underlying displaced calvarial fracture.    Trend CK levels   Elevated procalcitonin likely reactive, trend  PT and OT evaluation  Case management consulted for discharge planning and disposition  Fall precautions  Follow-up MRI brain to rule out acute CNS pathology versus MS flare  Continue with pain management as per protocol    Urinalysis suspicious for UTI   Urine appears to be grossly infected  Urine cultures pending  Started on ceftriaxone    Hypochloremia and anion gap metabolic acidosis  Hypercalcemia most likely hemoconcentration   Hypoalbuminemia   Lactic acidosis resolved  Received some gentle IV hydration in the ED  Monitor BMP and calcium levels closely  Albumin repleted, monitor albumin levels  Had lactic acidosis which resolved post fluid resuscitation    Elevated troponin most likely in setting of type II NSTEMI/demand ischemia  Troponin elevated but flat, 1 more troponin ordered  No current complaint of chest pain  Repeat EKG for symptoms  Could not appreciate any acute ischemic changes on EKG    Hyperbilirubinemia  Suspecting in the setting of hepatic congestion secondary to right-sided heart failure secondary to pulmonary hypertension  Trend LFTs  Follow-up liver sonogram    Acute versus chronic hypoxia possibly secondary to CHF/pulmonary hypertension   Chronic lymphedema  Patient was seen to be mildly hypoxic and tachypneic as per ED provider and was placed on 2 L nasal cannula with improvement in oxygenation and tachypnea  Lymphedema eval ordered  Blood cultures ordered currently pending results  Viral panel negative for COVID, flu, respiratory syncytial virus  CT scan of the chest showed Severely enlarged main pulmonary artery which is associated with pulmonary hypertension. No findings to suggest acute or chronic pulmonary embolism. Dilated right ventricle, likely secondary to #1. No acute lung parenchymal or pleural space process.    History of  heart failure not in exacerbation   Patient does have chronic lower extremity lymphedema  CT scan of the chest negative for fluid overload or heart failure exacerbation signs  Monitor volume status closely  BNP elevated at 7880  Did receive some fluids in the ED, will hold further fluid resuscitation  Continue with Bumex, Aldactone, Jardiance  Consider cardiology evaluation if needed    Polycythemia possibly from chronic hypoxia  Monitor CBC closely, could also be secondary to hemoconcentration which we will trend down post fluid resuscitation    Obesity  On Wegovy outpatient currently would be on hold    History of mood disorder  Continue with Lexapro, will continue  QT slightly elevated, would advise to repeat EKG in a.m.    DVT PPx  Intermittent pneumatic compression    CODE STATUS  Full code as per discussion with the patient          Diet: Combination Diet Low Saturated Fat Na <2400mg Diet, No Caffeine Diet    DVT Prophylaxis: Pneumatic Compression Devices  Brar Catheter: Not present  Lines: None     Cardiac Monitoring: None  Code Status: Full Code  Mental status: Patient is alert awake and oriented x 3, patient is a good Historian and most of the history was obtained from the patient, some history was obtained from chart review and the rest of the history was obtained from discussion with the ER physician  Clinically Significant Risk Factors Present on Admission          # Hypochloremia: Lowest Cl = 97 mmol/L in last 2 days, will monitor as appropriate   # Hypercalcemia: Highest Ca = 10.7 mg/dL in last 2 days, will monitor as appropriate    # Hypoalbuminemia: Lowest albumin = 3.2 g/dL at 2/11/2025  1:48 PM, will monitor as appropriate   # Drug Induced Platelet Defect: home medication list includes an antiplatelet medication   # Hypertension: Noted on problem list    # Chronic heart failure with preserved ejection fraction: heart failure noted on problem list and last echo with EF >50%          # Severe  "Obesity: Estimated body mass index is 56.64 kg/m  as calculated from the following:    Height as of this encounter: 1.626 m (5' 4\").    Weight as of this encounter: 149.7 kg (330 lb).         # Financial/Environmental Concerns: none       Disposition Plan     Medically Ready for Discharge: Anticipated in 2-4 Days     Saad J. Gondal, MD  Hospitalist Service  [facility]  Securely message with [employer] (more info)  Text page via [employer] Paging/Directory     ______________________________________________________________________    Chief Complaint   S/p fall, Weakness    History is obtained from the patient and Chart review    History of Present Illness   Bess Enamorado is a 49 year old female with a past medical history of multiple sclerosis, hypertension, CHF presented to the ED by ambulance for evaluation post fall, per patient he fell yesterday and was unable to get up, she stated that her left leg was not doing what she wanted to do and eventually had a fall, she was found this morning by the cleaning lady, she did noted that prior to the fall she was sick for 5 days with a fever and slight cough along with fatigue, has a history of multiple sclerosis which is worse in her left side, also has heart failure and bad lymphedema in both her legs, recently started on Wegovy, patient denied hitting her head or passing out upon falling, denied any pain but feeling stiffness from being on the floor all night, in the ER vitals were fairly within normal limits other than minimal hypoxia and patient was placed on 2 L nasal cannula with improvement in oxygenation, labs significant for hypochloremia, anion gap metabolic acidosis, hypercalcemia, hypoalbuminemia, hyperbilirubinemia, CK was mildly elevated 248, patient also had lactic acidosis and eventually received fluids in the ED with improvement in lactic acid levels on repeat testing, BNP was elevated at 7880, procalcitonin elevated at 0.73, troponin " elevated but flat, patient also had polycythemia possibly from chronic hypoxia, blood cultures drawn, viral panel negative for COVID, flu, respiratory syncytial virus, CT scan of the chest was negative for acute process, CT scan of the head was also performed which showed no acute process but showed  A few small nonspecific hypoattenuating lesions in the right cerebellar hemisphere, not definitively present on the previous exam. These raise concern for age-indeterminate infarcts, possibly chronic. Right occipital/suboccipital scalp contusion without underlying displaced calvarial fracture.  Patient is going to be admitted post fall, PT and OT evaluation, possible placement and to rule out MS flare versus other CNS pathology.      Past Medical History    Past Medical History:   Diagnosis Date    Acute on chronic diastolic congestive heart failure (H) 02/09/2022    Arthritis 2019    Hips    ASCUS favor benign 08/2015    Neg HPV    Depressive disorder 2010    H/O colposcopy with cervical biopsy 09/14/2015    Bx & ECC - negative    Hypertension 2019    LSIL on Pap smear 07/2014    Neg high risk HPV    Multiple sclerosis (H) 08/29/2005 9/18/200pt dx with MS 2004--dx by neurolgist and is followed by Dr. Harris    Myocardial infarction (H)     Obese     Pneumonia due to infectious organism, unspecified laterality, unspecified part of lung 02/08/2022    Sepsis (Temp 101, , WBC 13.0) 10/23/2018    Shingles 10/2016    SIRS (systemic inflammatory response syndrome) (H) 03/04/2021    Sleep apnea     UNSPEC CONSTIPATION 09/18/2006 9/18/2006   Has tried otc suppository and otc lax.        Past Surgical History   Past Surgical History:   Procedure Laterality Date    CHOLECYSTECTOMY, LAPOROSCOPIC      Cholecystectomy, Laparoscopic    COLONOSCOPY  2007?    Bathroom issue..results normal    COMBINED CYSTOSCOPY, RETROGRADES, EXCHANGE STENT URETER(S) Right 2/21/2022    Procedure: CYSTOSCOPY, WITH right RETROGRADE PYELOGRAM  AND right URETERAL STENT PLACEMENT;  Surgeon: Doyle Palomares MD;  Location: [facility] OR    OPEN REDUCTION INTERNAL FIXATION TOE(S)  4/13/2012    Procedure:OPEN REDUCTION INTERNAL FIXATION TOE(S); Open reduction internal fixation right proximal fifth metatarsal fracture-   Anes-choice block; Surgeon:LEY, JEFFREY DUANE; Location:[facility] OR    PICC SINGLE LUMEN PLACEMENT  3/20/2024    PICC TRIPLE LUMEN PLACEMENT  2/21/2022            Prior to Admission Medications   Prior to Admission Medications   Prescriptions Last Dose Informant Patient Reported? Taking?   Emollient (GOLD BOND MEDICATED BODY EX)  Self Yes Yes   Sig: Externally apply 1 Application topically 2 times daily as needed   acetaminophen (TYLENOL) 650 MG CR tablet  Self Yes Yes   Sig: Take 650 mg by mouth every 8 hours as needed for mild pain or fever   aspirin (ASPIRIN LOW DOSE) 81 MG EC tablet Past Month Morning Self No Yes   Sig: Take 1 tablet (81 mg) by mouth daily   bumetanide (BUMEX) 2 MG tablet Past Week Morning  No Yes   Sig: Take 3 tablets (6 mg) by mouth daily.   empagliflozin (JARDIANCE) 10 MG TABS tablet Past Month Morning Self No Yes   Sig: Take 1 tablet (10 mg) by mouth daily   escitalopram (LEXAPRO) 10 MG tablet Past Month Morning  No Yes   Sig: Take 1 tablet (10 mg) by mouth daily.   potassium chloride sheila ER (KLOR-CON M10) 10 MEQ CR tablet Past Week Morning  No Yes   Sig: Take 3 tablets (30 mEq) by mouth daily.   semaglutide-weight management (WEGOVY) 0.25 MG/0.5ML pen 2/5/2025  No Yes   Sig: Inject 0.5 mLs (0.25 mg) subcutaneously once a week.   spironolactone (ALDACTONE) 25 MG tablet Past Month Morning  No Yes   Sig: Take 0.5 tablets (12.5 mg) by mouth daily.      Facility-Administered Medications: None        Review of Systems    The 10 point Review of Systems is negative other than noted in the HPI or here.  Generalized weakness    Physical Exam   Vital Signs: Temp: 97.4  F (36.3  C) Temp src: Oral BP: 121/72 Pulse: 96   Resp: 25  SpO2: 95 % O2 Device: Nasal cannula Oxygen Delivery: 2 LPM  Weight: 330 lbs 0 oz    GENERAL: Patient was seen and examined at bedside with no acute distress, morbidly obese  HENT:  Head is normocephalic, atraumatic.   EYES:  Eyes have anicteric sclerae without conjunctival injection   LUNGS: Bilateral air entry, clear to auscultation bilaterally, decreased breath sounds at the bilateral bases  CARDIOVASCULAR:  Regular rate and rhythm with no murmurs, gallops or rubs.  ABDOMEN:  Normal bowel sounds. Soft; nontender   EXT: Bilateral lower extremity lymphedema  SKIN:  No acute rashes.   NEUROLOGIC:  Grossly nonfocal.      Medical Decision Making       80 MINUTES SPENT BY ME on the date of service doing chart review, history, exam, documentation & further activities per the note.      Data     I have personally reviewed the following data over the past 24 hrs:    7.3  \   16.3 (H)   / 179     135 97 (L) 23.7 (H) /  87   4.6 20 (L) 0.66 \     ALT: 9 AST: 37 AP: 74 TBILI: 3.2 (H)   ALB: 3.2 (L) TOT PROTEIN: 8.1 LIPASE: 33     Trop: 47 (H) BNP: 7,880 (H)     Procal: 0.73 (H) CRP: N/A Lactic Acid: 2.0         Imaging results reviewed over the past 24 hrs:   Recent Results (from the past 24 hours)   Head CT w/o contrast    Narrative    EXAM: CT HEAD W/O CONTRAST  LOCATION: [facility]  DATE: 2/11/2025    INDICATION: Weakness, fall.  COMPARISON: CT of the head dated 2/9/2022.  TECHNIQUE: Routine CT Head without IV contrast. Multiplanar reformats. Dose reduction techniques were used.    FINDINGS:  INTRACRANIAL CONTENTS: No intracranial hemorrhage, extraaxial collection, or mass effect.  There are a few small nonspecific hypoattenuating lesions in the right cerebellar hemisphere (series 4 image 9), not definitively present on the previous exam.   These raise concern for age-indeterminate infarcts, possibly chronic. Brain parenchymal attenuation otherwise appears within normal limits for age. Mild  generalized volume loss. No hydrocephalus.     VISUALIZED ORBITS/SINUSES/MASTOIDS: No intraorbital abnormality. No paranasal sinus mucosal disease. No middle ear or mastoid effusion.    BONES/SOFT TISSUES: There is a presumed focal hematoma overlying the right occipital calvarium involving the occipital/suboccipital scalp with mild surrounding subcutaneous fat stranding, likely representing contusion given the patient's history of   fall/trauma. No underlying displaced calvarial fracture identified.      Impression    IMPRESSION:  1.  No acute intracranial hemorrhage, mass effect, or hydrocephalus.  2.  A few small nonspecific hypoattenuating lesions in the right cerebellar hemisphere, not definitively present on the previous exam. These raise concern for age-indeterminate infarcts, possibly chronic.  3.  Right occipital/suboccipital scalp contusion without underlying displaced calvarial fracture.     CT Chest w Contrast    Narrative    EXAM: CT CHEST W CONTRAST  LOCATION: [facility]  DATE: 2/11/2025    INDICATION: Hypoxia, crackles on lung exam, fever  COMPARISON: CT pulmonary angiogram 7/18/2024  TECHNIQUE: CT chest with IV contrast. Multiplanar reformats were obtained. Dose reduction techniques were used.    CONTRAST: isovue 370 90ml    FINDINGS:   LUNGS AND PLEURA: Detailed assessment of the lung parenchyma is suboptimal due to respiratory motion-related blurring. No airspace opacities or interlobular septal thickening. Tiny benign calcified granulomata are present in both lung bases. Central   airways are patent. No pleural effusion.    MEDIASTINUM: There are mitral annular calcifications. Left heart chambers are normal in size. Dilated right ventricle with normal right atrium. The main pulmonary artery is severely enlarged measuring 4.5 cm in diameter. The right and left pulmonary   arteries and their proximal branches are also dilated and subsegmental pulmonary arteries have mild  tortuosity. No pulmonary artery filling defects, calcifications, or webs. Caliber of the pulmonary veins is normal. No enlarged mediastinal or hilar lymph   nodes. Mild heterogeneity of the thyroid gland but no discrete nodules. Esophagus is decompressed.    CORONARY ARTERY CALCIFICATION: Cannot evaluate. Motion-related blurring precludes assessment of coronary calcification.    UPPER ABDOMEN: Prior cholecystectomy.    MUSCULOSKELETAL: Small diffuse thoracic spine degenerative osteophytes. No aggressive or destructive bone lesions.      Impression    IMPRESSION:     1.  Severely enlarged main pulmonary artery which is associated with pulmonary hypertension. No findings to suggest acute or chronic pulmonary embolism.  2.  Dilated right ventricle, likely secondary to #1.  3.  No acute lung parenchymal or pleural space process.     US Abdomen Limited    Narrative    EXAM: US ABDOMEN LIMITED  LOCATION: [facility]  DATE: 2/11/2025    INDICATION: abnormal lfts  COMPARISON: CT 7/18/2024  TECHNIQUE: Limited abdominal ultrasound.    FINDINGS:  Technically difficult examination due to patient body habitus.    GALLBLADDER: Surgically removed.    BILE DUCTS: No biliary dilatation. The common duct measures 4 mm.    LIVER: Normal parenchyma with smooth contour. No focal mass. The portal vein is patent with flow in the normal direction.    RIGHT KIDNEY: No hydronephrosis.    PANCREAS: The visualized portions are normal.    No ascites.      Impression    IMPRESSION:  No acute findings or explanation for elevated liver function tests.

## 2025-02-11 NOTE — ED TRIAGE NOTES
Pt arrives from home after a fall 2 days ago. Did not hit head, no LOC, not on thinners. Lives alone. After the fall, was not able to get up and has been laying on the floor. Neighbor insisted she been seen. VSS, BG 92. Edematous legs.

## 2025-02-12 ENCOUNTER — APPOINTMENT (OUTPATIENT)
Dept: CARDIOLOGY | Facility: HOSPITAL | Age: 50
DRG: 280 | End: 2025-02-12
Attending: INTERNAL MEDICINE
Payer: COMMERCIAL

## 2025-02-12 ENCOUNTER — PRE VISIT (OUTPATIENT)
Dept: NEUROLOGY | Facility: CLINIC | Age: 50
End: 2025-02-12
Payer: MEDICARE

## 2025-02-12 ENCOUNTER — APPOINTMENT (OUTPATIENT)
Dept: SPEECH THERAPY | Facility: HOSPITAL | Age: 50
End: 2025-02-12
Attending: INTERNAL MEDICINE
Payer: COMMERCIAL

## 2025-02-12 ENCOUNTER — APPOINTMENT (OUTPATIENT)
Dept: ULTRASOUND IMAGING | Facility: HOSPITAL | Age: 50
End: 2025-02-12
Attending: INTERNAL MEDICINE
Payer: COMMERCIAL

## 2025-02-12 ENCOUNTER — APPOINTMENT (OUTPATIENT)
Dept: ULTRASOUND IMAGING | Facility: HOSPITAL | Age: 50
End: 2025-02-12
Attending: PSYCHIATRY & NEUROLOGY
Payer: COMMERCIAL

## 2025-02-12 LAB
ALBUMIN SERPL BCG-MCNC: 3.5 G/DL (ref 3.5–5.2)
ALP SERPL-CCNC: 65 U/L (ref 40–150)
ALT SERPL W P-5'-P-CCNC: 9 U/L (ref 0–50)
ANION GAP SERPL CALCULATED.3IONS-SCNC: 13 MMOL/L (ref 7–15)
AST SERPL W P-5'-P-CCNC: 31 U/L (ref 0–45)
BACTERIA UR CULT: ABNORMAL
BILIRUB DIRECT SERPL-MCNC: 1.17 MG/DL (ref 0–0.3)
BILIRUB SERPL-MCNC: 2.7 MG/DL
BUN SERPL-MCNC: 21.7 MG/DL (ref 6–20)
CALCIUM SERPL-MCNC: 10.4 MG/DL (ref 8.8–10.4)
CHLORIDE SERPL-SCNC: 102 MMOL/L (ref 98–107)
CHOLEST SERPL-MCNC: 81 MG/DL
CK SERPL-CCNC: 96 U/L (ref 26–192)
CREAT SERPL-MCNC: 0.68 MG/DL (ref 0.51–0.95)
EGFRCR SERPLBLD CKD-EPI 2021: >90 ML/MIN/1.73M2
ERYTHROCYTE [DISTWIDTH] IN BLOOD BY AUTOMATED COUNT: 18.6 % (ref 10–15)
EST. AVERAGE GLUCOSE BLD GHB EST-MCNC: 114 MG/DL
GLUCOSE BLDC GLUCOMTR-MCNC: 100 MG/DL (ref 70–99)
GLUCOSE BLDC GLUCOMTR-MCNC: 105 MG/DL (ref 70–99)
GLUCOSE BLDC GLUCOMTR-MCNC: 93 MG/DL (ref 70–99)
GLUCOSE BLDC GLUCOMTR-MCNC: 93 MG/DL (ref 70–99)
GLUCOSE BLDC GLUCOMTR-MCNC: 95 MG/DL (ref 70–99)
GLUCOSE SERPL-MCNC: 101 MG/DL (ref 70–99)
HBA1C MFR BLD: 5.6 %
HCO3 SERPL-SCNC: 23 MMOL/L (ref 22–29)
HCT VFR BLD AUTO: 45.5 % (ref 35–47)
HDLC SERPL-MCNC: 11 MG/DL
HGB BLD-MCNC: 14.7 G/DL (ref 11.7–15.7)
LDLC SERPL CALC-MCNC: 43 MG/DL
MCH RBC QN AUTO: 27.6 PG (ref 26.5–33)
MCHC RBC AUTO-ENTMCNC: 32.3 G/DL (ref 31.5–36.5)
MCV RBC AUTO: 85 FL (ref 78–100)
NONHDLC SERPL-MCNC: 70 MG/DL
PHOSPHATE SERPL-MCNC: 3.1 MG/DL (ref 2.5–4.5)
PLATELET # BLD AUTO: 181 10E3/UL (ref 150–450)
POTASSIUM SERPL-SCNC: 3.9 MMOL/L (ref 3.4–5.3)
PROCALCITONIN SERPL IA-MCNC: 0.65 NG/ML
PROT SERPL-MCNC: 7.7 G/DL (ref 6.4–8.3)
RBC # BLD AUTO: 5.33 10E6/UL (ref 3.8–5.2)
SODIUM SERPL-SCNC: 138 MMOL/L (ref 135–145)
TRIGL SERPL-MCNC: 134 MG/DL
TROPONIN T SERPL HS-MCNC: 98 NG/L
TSH SERPL DL<=0.005 MIU/L-ACNC: 3.52 UIU/ML (ref 0.3–4.2)
VIT B12 SERPL-MCNC: 830 PG/ML (ref 232–1245)
WBC # BLD AUTO: 7.2 10E3/UL (ref 4–11)

## 2025-02-12 PROCEDURE — 85018 HEMOGLOBIN: CPT | Performed by: STUDENT IN AN ORGANIZED HEALTH CARE EDUCATION/TRAINING PROGRAM

## 2025-02-12 PROCEDURE — 99223 1ST HOSP IP/OBS HIGH 75: CPT | Performed by: INTERNAL MEDICINE

## 2025-02-12 PROCEDURE — 82607 VITAMIN B-12: CPT | Performed by: PSYCHIATRY & NEUROLOGY

## 2025-02-12 PROCEDURE — C8929 TTE W OR WO FOL WCON,DOPPLER: HCPCS

## 2025-02-12 PROCEDURE — 99233 SBSQ HOSP IP/OBS HIGH 50: CPT | Performed by: INTERNAL MEDICINE

## 2025-02-12 PROCEDURE — 36415 COLL VENOUS BLD VENIPUNCTURE: CPT | Performed by: STUDENT IN AN ORGANIZED HEALTH CARE EDUCATION/TRAINING PROGRAM

## 2025-02-12 PROCEDURE — 250N000011 HC RX IP 250 OP 636: Performed by: INTERNAL MEDICINE

## 2025-02-12 PROCEDURE — 250N000013 HC RX MED GY IP 250 OP 250 PS 637: Performed by: STUDENT IN AN ORGANIZED HEALTH CARE EDUCATION/TRAINING PROGRAM

## 2025-02-12 PROCEDURE — 99418 PROLNG IP/OBS E/M EA 15 MIN: CPT | Performed by: INTERNAL MEDICINE

## 2025-02-12 PROCEDURE — 82550 ASSAY OF CK (CPK): CPT | Performed by: STUDENT IN AN ORGANIZED HEALTH CARE EDUCATION/TRAINING PROGRAM

## 2025-02-12 PROCEDURE — 84145 PROCALCITONIN (PCT): CPT | Performed by: STUDENT IN AN ORGANIZED HEALTH CARE EDUCATION/TRAINING PROGRAM

## 2025-02-12 PROCEDURE — 93970 EXTREMITY STUDY: CPT

## 2025-02-12 PROCEDURE — 83036 HEMOGLOBIN GLYCOSYLATED A1C: CPT | Performed by: INTERNAL MEDICINE

## 2025-02-12 PROCEDURE — 250N000011 HC RX IP 250 OP 636: Performed by: STUDENT IN AN ORGANIZED HEALTH CARE EDUCATION/TRAINING PROGRAM

## 2025-02-12 PROCEDURE — 93306 TTE W/DOPPLER COMPLETE: CPT | Mod: 26 | Performed by: INTERNAL MEDICINE

## 2025-02-12 PROCEDURE — 82962 GLUCOSE BLOOD TEST: CPT

## 2025-02-12 PROCEDURE — 99222 1ST HOSP IP/OBS MODERATE 55: CPT | Performed by: PSYCHIATRY & NEUROLOGY

## 2025-02-12 PROCEDURE — 94660 CPAP INITIATION&MGMT: CPT

## 2025-02-12 PROCEDURE — 82465 ASSAY BLD/SERUM CHOLESTEROL: CPT | Performed by: INTERNAL MEDICINE

## 2025-02-12 PROCEDURE — 93880 EXTRACRANIAL BILAT STUDY: CPT

## 2025-02-12 PROCEDURE — 250N000013 HC RX MED GY IP 250 OP 250 PS 637: Performed by: INTERNAL MEDICINE

## 2025-02-12 PROCEDURE — 82248 BILIRUBIN DIRECT: CPT | Performed by: STUDENT IN AN ORGANIZED HEALTH CARE EDUCATION/TRAINING PROGRAM

## 2025-02-12 PROCEDURE — 92610 EVALUATE SWALLOWING FUNCTION: CPT | Mod: GN

## 2025-02-12 PROCEDURE — 255N000002 HC RX 255 OP 636: Performed by: INTERNAL MEDICINE

## 2025-02-12 PROCEDURE — 84443 ASSAY THYROID STIM HORMONE: CPT | Performed by: PSYCHIATRY & NEUROLOGY

## 2025-02-12 PROCEDURE — P9047 ALBUMIN (HUMAN), 25%, 50ML: HCPCS | Performed by: INTERNAL MEDICINE

## 2025-02-12 PROCEDURE — 5A09357 ASSISTANCE WITH RESPIRATORY VENTILATION, LESS THAN 24 CONSECUTIVE HOURS, CONTINUOUS POSITIVE AIRWAY PRESSURE: ICD-10-PCS | Performed by: INTERNAL MEDICINE

## 2025-02-12 PROCEDURE — G0463 HOSPITAL OUTPT CLINIC VISIT: HCPCS | Mod: 25

## 2025-02-12 PROCEDURE — 250N000013 HC RX MED GY IP 250 OP 250 PS 637: Performed by: PSYCHIATRY & NEUROLOGY

## 2025-02-12 PROCEDURE — 84100 ASSAY OF PHOSPHORUS: CPT | Performed by: STUDENT IN AN ORGANIZED HEALTH CARE EDUCATION/TRAINING PROGRAM

## 2025-02-12 PROCEDURE — 250N000011 HC RX IP 250 OP 636: Mod: JZ | Performed by: INTERNAL MEDICINE

## 2025-02-12 PROCEDURE — 84484 ASSAY OF TROPONIN QUANT: CPT | Performed by: INTERNAL MEDICINE

## 2025-02-12 PROCEDURE — 999N000157 HC STATISTIC RCP TIME EA 10 MIN

## 2025-02-12 PROCEDURE — 999N000208 ECHOCARDIOGRAM COMPLETE

## 2025-02-12 PROCEDURE — 99222 1ST HOSP IP/OBS MODERATE 55: CPT | Performed by: NURSE PRACTITIONER

## 2025-02-12 PROCEDURE — 120N000001 HC R&B MED SURG/OB

## 2025-02-12 PROCEDURE — 80053 COMPREHEN METABOLIC PANEL: CPT | Performed by: STUDENT IN AN ORGANIZED HEALTH CARE EDUCATION/TRAINING PROGRAM

## 2025-02-12 RX ORDER — HEPARIN SODIUM 5000 [USP'U]/.5ML
5000 INJECTION, SOLUTION INTRAVENOUS; SUBCUTANEOUS EVERY 8 HOURS
Status: DISCONTINUED | OUTPATIENT
Start: 2025-02-12 | End: 2025-02-18 | Stop reason: HOSPADM

## 2025-02-12 RX ORDER — SIMVASTATIN 10 MG
10 TABLET ORAL AT BEDTIME
Status: DISCONTINUED | OUTPATIENT
Start: 2025-02-12 | End: 2025-02-18 | Stop reason: HOSPADM

## 2025-02-12 RX ORDER — CLOPIDOGREL BISULFATE 75 MG/1
75 TABLET ORAL DAILY
Status: DISCONTINUED | OUTPATIENT
Start: 2025-02-13 | End: 2025-02-18 | Stop reason: HOSPADM

## 2025-02-12 RX ORDER — LIDOCAINE 40 MG/G
CREAM TOPICAL
Status: DISCONTINUED | OUTPATIENT
Start: 2025-02-12 | End: 2025-02-18 | Stop reason: HOSPADM

## 2025-02-12 RX ORDER — ALBUMIN (HUMAN) 12.5 G/50ML
50 SOLUTION INTRAVENOUS ONCE
Status: COMPLETED | OUTPATIENT
Start: 2025-02-12 | End: 2025-02-12

## 2025-02-12 RX ORDER — BUMETANIDE 0.25 MG/ML
2 INJECTION, SOLUTION INTRAMUSCULAR; INTRAVENOUS EVERY 12 HOURS
Status: DISCONTINUED | OUTPATIENT
Start: 2025-02-12 | End: 2025-02-12

## 2025-02-12 RX ORDER — BUMETANIDE 0.25 MG/ML
2 INJECTION, SOLUTION INTRAMUSCULAR; INTRAVENOUS EVERY 12 HOURS
Status: DISCONTINUED | OUTPATIENT
Start: 2025-02-12 | End: 2025-02-14

## 2025-02-12 RX ORDER — CLOPIDOGREL BISULFATE 75 MG/1
300 TABLET ORAL ONCE
Status: COMPLETED | OUTPATIENT
Start: 2025-02-12 | End: 2025-02-12

## 2025-02-12 RX ADMIN — HEPARIN SODIUM 5000 UNITS: 5000 INJECTION, SOLUTION INTRAVENOUS; SUBCUTANEOUS at 12:17

## 2025-02-12 RX ADMIN — ESCITALOPRAM 10 MG: 5 TABLET, FILM COATED ORAL at 08:42

## 2025-02-12 RX ADMIN — ACETAMINOPHEN 650 MG: 325 TABLET ORAL at 08:24

## 2025-02-12 RX ADMIN — BUMETANIDE 6 MG: 2 TABLET ORAL at 08:42

## 2025-02-12 RX ADMIN — SPIRONOLACTONE 12.5 MG: 25 TABLET, FILM COATED ORAL at 08:42

## 2025-02-12 RX ADMIN — HEPARIN SODIUM 5000 UNITS: 5000 INJECTION, SOLUTION INTRAVENOUS; SUBCUTANEOUS at 18:57

## 2025-02-12 RX ADMIN — ASPIRIN 81 MG: 81 TABLET, COATED ORAL at 08:42

## 2025-02-12 RX ADMIN — BUMETANIDE 2 MG: 0.25 INJECTION INTRAMUSCULAR; INTRAVENOUS at 16:39

## 2025-02-12 RX ADMIN — CLOPIDOGREL BISULFATE 300 MG: 75 TABLET ORAL at 12:17

## 2025-02-12 RX ADMIN — ALBUMIN HUMAN 50 G: 0.25 SOLUTION INTRAVENOUS at 12:22

## 2025-02-12 RX ADMIN — EMPAGLIFLOZIN 10 MG: 10 TABLET, FILM COATED ORAL at 08:42

## 2025-02-12 RX ADMIN — CEFTRIAXONE SODIUM 1 G: 1 INJECTION, POWDER, FOR SOLUTION INTRAMUSCULAR; INTRAVENOUS at 18:25

## 2025-02-12 RX ADMIN — PERFLUTREN 5 ML: 6.52 INJECTION, SUSPENSION INTRAVENOUS at 10:50

## 2025-02-12 RX ADMIN — SIMVASTATIN 10 MG: 10 TABLET, FILM COATED ORAL at 22:33

## 2025-02-12 ASSESSMENT — ACTIVITIES OF DAILY LIVING (ADL)
ADLS_ACUITY_SCORE: 66
ADLS_ACUITY_SCORE: 66
ADLS_ACUITY_SCORE: 64
ADLS_ACUITY_SCORE: 66
ADLS_ACUITY_SCORE: 65
ADLS_ACUITY_SCORE: 65
ADLS_ACUITY_SCORE: 64
ADLS_ACUITY_SCORE: 66
ADLS_ACUITY_SCORE: 65
ADLS_ACUITY_SCORE: 64
ADLS_ACUITY_SCORE: 66
ADLS_ACUITY_SCORE: 65
ADLS_ACUITY_SCORE: 66
ADLS_ACUITY_SCORE: 64
ADLS_ACUITY_SCORE: 66
ADLS_ACUITY_SCORE: 66
ADLS_ACUITY_SCORE: 64
ADLS_ACUITY_SCORE: 64
ADLS_ACUITY_SCORE: 66

## 2025-02-12 NOTE — DISCHARGE INSTRUCTIONS
BLE - daily or with lymphedema wrap changes if less frequent than daily  Cleanse with Vashe (moisten gauze with Vashe and place over affected areas for 10 - 15 minutes); gently dry or air dry.  Apply Once a Day or Sween 24 moisturizer (pink and white tube) or moisturizer of choice at home.

## 2025-02-12 NOTE — CONSULTS
[facility] Neurology  [place]    Bess Enamorado MRN# [id number]   Age: 49 year old YOB: 1975               Assessment and Plan:      History of MS  Punctate diffusion abnormality in right frontal white matter (incidental stroke versus artifact)  Generalized weakness, likely due to urinary tract infection     With the diffusion abnormality, we will complete the stroke workup with echocardiogram, carotid Doppler studies and lipid panel.  She does normally take a daily aspirin, we will start dual antiplatelet therapy with aspirin plus Plavix for 3 weeks, after that she can resume the daily aspirin 81 mg    She had MRI of the brain with contrast last evening, MRIs of C-spine and T-spine today may be difficult to interpret with that contrast still on board, if she is still here tomorrow we could consider MRIs of the spinal cord in terms of aiding her multiple sclerosis treatment.    She has chronic weakness in the left leg likely due to both MS deficits and severe edema    Will add some further labs to look for other metabolic issues that might contribute to current presentation    Physical therapy as able while here in the hospital.          Chief Complaint/HPI:     This patient is a 49-year-old woman seen for neurologic consultation today.  She was admitted to the hospital last evening, she had fallen at home on Monday, trying to take a shower.  She was unable to get up and was found by the cleaning person yesterday.  She had not brought her phone with her which she typically does.  She has been feeling ill with some low-grade fevers and has been taking Tylenol in recent days.  She does have a history of multiple sclerosis affecting mainly her left side.  She does not feel there has been any new focal weakness, though she does feel weak in general.  She does note that typically she would not be able to get up from the floor after a fall.  Due to left-sided weakness and significant edema in the  lower extremities.    Regarding her multiple sclerosis, she has seen Dr. Harris in the past and more recently saw Dr. Upton but he had moved to a different practice.  In the past she had been on Rebif, Copaxone and Tecfidera.  Most recently she was on semiannual ocrelizumab infusions but she has not had that in a few years now.  She did have an appointment with an MS specialist at the  today but unfortunately that appointment had to be canceled due to her admission.    MRI of the brain overnight shows no acute MS lesions, but there is a punctate diffusion signal abnormality in the white matter of the right frontal lobe.  Workup also shows significant pyuria, she has received ceftriaxone intravenously overnight, and also some IV fluids.  She is feeling significantly better today compared to yesterday.            Past Medical History:    has a past medical history of Acute on chronic diastolic congestive heart failure (H) (02/09/2022), Arthritis (2019), ASCUS favor benign (08/2015), Depressive disorder (2010), H/O colposcopy with cervical biopsy (09/14/2015), Hypertension (2019), LSIL on Pap smear (07/2014), Multiple sclerosis (H) (08/29/2005), Myocardial infarction (H), Obese, Pneumonia due to infectious organism, unspecified laterality, unspecified part of lung (02/08/2022), Sepsis (Temp 101, , WBC 13.0) (10/23/2018), Shingles (10/2016), SIRS (systemic inflammatory response syndrome) (H) (03/04/2021), Sleep apnea, and UNSPEC CONSTIPATION (09/18/2006).          Past Surgical History:    has a past surgical history that includes cholecystectomy, laporoscopic; Open reduction internal fixation toe(s) (4/13/2012); colonoscopy (2007?); PICC/Midline Placement (2/21/2022); Combined cystoscopy, retrogrades, exchange stent ureter(s) (Right, 2/21/2022); and PICC/Midline Placement (3/20/2024).          Social History:     Social History     Tobacco Use    Smoking status: Never    Smokeless tobacco: Never   Substance  Use Topics    Alcohol use: Yes     Comment: social             Family History:     Family History   Problem Relation Age of Onset    Neurologic Disorder Father         MS    Heart Disease Father         irregular heart rate    Diabetes Father     Melanoma Father     Sleep Apnea Father     Other Cancer Father     Cancer Maternal Grandfather         lung    Other Cancer Maternal Grandfather     Cancer Paternal Grandmother         stomach    Other Cancer Paternal Grandmother     Cancer Mother     Other Cancer Mother     Thyroid Disease Mother     Gynecology Maternal Aunt         PCOS    Hypertension Maternal Grandmother     Anxiety Disorder Maternal Grandmother     Thyroid Disease Maternal Grandmother     Anxiety Disorder Sister                 Allergies:   No Known Allergies          Medications:     Current Facility-Administered Medications:     acetaminophen (TYLENOL) tablet 650 mg, 650 mg, Oral, Q4H PRN, 650 mg at 02/12/25 0824 **OR** acetaminophen (TYLENOL) Suppository 650 mg, 650 mg, Rectal, Q4H PRN, Gondal, Saad J, MD    aspirin EC tablet 81 mg, 81 mg, Oral, Daily, Gondal, Saad J, MD, 81 mg at 02/12/25 0842    bumetanide (BUMEX) tablet 6 mg, 6 mg, Oral, Daily, Gondal, Saad J, MD, 6 mg at 02/12/25 0842    calcium carbonate (TUMS) chewable tablet 1,000 mg, 1,000 mg, Oral, 4x Daily PRN, Gondal, Saad J, MD    cefTRIAXone (ROCEPHIN) 1 g vial to attach to  mL bag for ADULTS or NS 50 mL bag for PEDS, 1 g, Intravenous, Q24H, Gondal, Saad J, MD    empagliflozin (JARDIANCE) tablet 10 mg, 10 mg, Oral, Daily, Gondal, Saad J, MD, 10 mg at 02/12/25 0842    escitalopram (LEXAPRO) tablet 10 mg, 10 mg, Oral, Daily, Gondal, Saad J, MD, 10 mg at 02/12/25 0842    HYDROmorphone (DILAUDID) injection 0.2 mg, 0.2 mg, Intravenous, Q2H PRN, Gondal, Saad J, MD    HYDROmorphone (DILAUDID) injection 0.4 mg, 0.4 mg, Intravenous, Q2H PRN, Gondal, Saad J, MD    lidocaine (LMX4) cream, , Topical, Q1H PRN, Bianca Combs MD     lidocaine (LMX4) cream, , Topical, Q1H PRN, Gondal, Saad J, MD    lidocaine 1 % 0.1-1 mL, 0.1-1 mL, Other, Q1H PRN, Bianca Combs MD    lidocaine 1 % 0.1-1 mL, 0.1-1 mL, Other, Q1H PRN, Gondal, Saad J, MD    naloxone (NARCAN) injection 0.2 mg, 0.2 mg, Intravenous, Q2 Min PRN **OR** naloxone (NARCAN) injection 0.4 mg, 0.4 mg, Intravenous, Q2 Min PRN **OR** naloxone (NARCAN) injection 0.2 mg, 0.2 mg, Intramuscular, Q2 Min PRN **OR** naloxone (NARCAN) injection 0.4 mg, 0.4 mg, Intramuscular, Q2 Min PRN, Gondal, Saad J, MD    ondansetron (ZOFRAN ODT) ODT tab 4 mg, 4 mg, Oral, Q6H PRN **OR** ondansetron (ZOFRAN) injection 4 mg, 4 mg, Intravenous, Q6H PRN, Gondal, Saad J, MD    oxyCODONE (ROXICODONE) tablet 5 mg, 5 mg, Oral, Q4H PRN, Gondal, Saad J, MD    oxyCODONE IR (ROXICODONE) half-tab 2.5 mg, 2.5 mg, Oral, Q4H PRN, Gondal, Saad J, MD    senna-docusate (SENOKOT-S/PERICOLACE) 8.6-50 MG per tablet 1 tablet, 1 tablet, Oral, BID PRN **OR** senna-docusate (SENOKOT-S/PERICOLACE) 8.6-50 MG per tablet 2 tablet, 2 tablet, Oral, BID PRN, Gondal, Saad J, MD    simvastatin (ZOCOR) tablet 10 mg, 10 mg, Oral, At Bedtime, Bianca Combs MD    sodium chloride (PF) 0.9% PF flush 3 mL, 3 mL, Intracatheter, Q8H, Bianca Combs MD    sodium chloride (PF) 0.9% PF flush 3 mL, 3 mL, Intracatheter, q1 min prn, Bianca Combs MD    sodium chloride (PF) 0.9% PF flush 3 mL, 3 mL, Intracatheter, Q8H, Gondal, Saad J, MD, 3 mL at 02/12/25 0317    sodium chloride (PF) 0.9% PF flush 3 mL, 3 mL, Intracatheter, q1 min prn, Gondal, Saad J, MD    spironolactone (ALDACTONE) half-tab 12.5 mg, 12.5 mg, Oral, Daily, Gondal, Saad J, MD, 12.5 mg at 02/12/25 0842    Current Outpatient Medications:     acetaminophen (TYLENOL) 650 MG CR tablet, Take 650 mg by mouth every 8 hours as needed for mild pain or fever, Disp: , Rfl:     aspirin (ASPIRIN LOW DOSE) 81 MG EC tablet, Take 1 tablet (81 mg) by mouth daily, Disp: , Rfl:     bumetanide  "(BUMEX) 2 MG tablet, Take 3 tablets (6 mg) by mouth daily., Disp: 360 tablet, Rfl: 1    Emollient (GOLD BOND MEDICATED BODY EX), Externally apply 1 Application topically 2 times daily as needed, Disp: , Rfl:     empagliflozin (JARDIANCE) 10 MG TABS tablet, Take 1 tablet (10 mg) by mouth daily, Disp: 90 tablet, Rfl: 1    escitalopram (LEXAPRO) 10 MG tablet, Take 1 tablet (10 mg) by mouth daily., Disp: 90 tablet, Rfl: 1    potassium chloride sheila ER (KLOR-CON M10) 10 MEQ CR tablet, Take 3 tablets (30 mEq) by mouth daily., Disp: 90 tablet, Rfl: 3    semaglutide-weight management (WEGOVY) 0.25 MG/0.5ML pen, Inject 0.5 mLs (0.25 mg) subcutaneously once a week., Disp: 2 mL, Rfl: 1    spironolactone (ALDACTONE) 25 MG tablet, Take 0.5 tablets (12.5 mg) by mouth daily., Disp: 45 tablet, Rfl: 2              Physical Exam:      Vitals: /66   Pulse 104   Temp 98.4  F (36.9  C) (Oral)   Resp 29   Ht 1.626 m (5' 4\")   Wt (!) 149.7 kg (330 lb)   SpO2 (!) 90%   BMI 56.64 kg/m    BMI= Body mass index is 56.64 kg/m .     Patient is alert and in no acute distress, supine in the emergency room. Neck was supple, no carotid bruits, thyromegaly, lymphadenopathy or JVD noted.   Neurological Exam:   Mental status: Patient is alert and oriented x 3. Speech is clear and fluent    CN II: PERRLA.   CN III, IV, VI: EOMI. no nystagmus   CN VII: Face is symmetric    CN VII: Hearing is normal to conversation   CN IX, X: Palate elevates symmetrically. Phonation is normal.    CN XI: Head turning and shoulder shrug are intact   Motor: Hip flexor weakness bilaterally, left worse than right  Plantarflexion markedly weak on the left compared to the right  With dorsiflexion testing there is giveaway weakness bilaterally when tested simultaneously.   Reflexes: Reflexes are trace in the arms, absent in the legs   Sensory: Sensation diminished at the feet bilaterally chest feels   Coordination: Rapid alternating movements and fine finger " movements are intact. There is no dysmetria on finger-to-nose testing.    Gait: Unable to walk independently      Eric Blevins MD       More than 60 minutes spent reviewing records and results, evaluating patient, discussing with pt and on documentation

## 2025-02-12 NOTE — PROGRESS NOTES
"[facility] ED Handoff Report    ED Chief Complaint: eval after fall     ED Diagnosis:  (I89.0) Lymphedema of both lower extremities [I89.0]  (primary encounter diagnosis)  Comment:   Plan: had lymphedema therapy and WOC consult    (G35) Multiple sclerosis (H)  Comment:   Plan: Primary Care - Care Coordination Referral        Neurology consult    (G47.33) NARCISA on CPAP  Comment:   Plan: CPAP at night    (I50.32) Chronic heart failure with preserved ejection fraction (H)  Comment:   Plan: Diuresis, high urine output today    (R53.1) Weakness  Comment:   Plan: Primary Care - Care Coordination Referral        PT and OT saw patient           PMH:    Past Medical History:   Diagnosis Date    Acute on chronic diastolic congestive heart failure (H) 02/09/2022    Arthritis 2019    Hips    ASCUS favor benign 08/2015    Neg HPV    Depressive disorder 2010    H/O colposcopy with cervical biopsy 09/14/2015    Bx & ECC - negative    Hypertension 2019    LSIL on Pap smear 07/2014    Neg high risk HPV    Multiple sclerosis (H) 08/29/2005 9/18/200pt dx with MS 2004--dx by neurolgist and is followed by Dr. Harris    Myocardial infarction (H)     Obese     Pneumonia due to infectious organism, unspecified laterality, unspecified part of lung 02/08/2022    Sepsis (Temp 101, , WBC 13.0) 10/23/2018    Shingles 10/2016    SIRS (systemic inflammatory response syndrome) (H) 03/04/2021    Sleep apnea     UNSPEC CONSTIPATION 09/18/2006 9/18/2006   Has tried otc suppository and otc lax.         Code Status:  Full Code     Falls Risk: Yes Band: Applied    Current Living Situation/Residence: lives alone     Elimination Status: Continent: Yes     Activity Level: 2 assist weak on the left side at baseline    Patients Preferred Language:  English     Needed: No    Vital Signs:  /66   Pulse 104   Temp 98.4  F (36.9  C) (Oral)   Resp 29   Ht 1.626 m (5' 4\")   Wt (!) 149.7 kg (330 lb)   SpO2 (!) 90%   BMI " 56.64 kg/m       Cardiac Rhythm: NSR    Pain Score: 3/10    Is the Patient Confused:  No    Last Food or Drink: 02/12/25     Focused Assessment:  A/O x4, vitals stable, Neuros intact and at baseline, +4 edema lower extremities, pustule like bumps on lower extremities from edema, lung sounds diminished, 3L NC at baseline,  and 94 this shift, uses the purewick     Tests Performed: Done: Labs and Imaging Needs carotid ultrasound and lower extremity venous ultrasound. They cannot get to her until later this afternoon    Treatments Provided:  diuresis, WOC, lymphedema therapy, PT, OT    Family Dynamics/Concerns: No    Family Updated On Visitor Policy: Yes    Plan of Care Communicated to Family: Yes    Who Was Updated about Plan of Care: family    Belongings Checklist Done and Signed by Patient: N/A    Belongings Sent with Patient: phone, clothes    Medications sent with patient: none    Covid: asymptomatic , negative    Additional Information:     RN: Gregoria Banuelos RN 2/12/2025 2:55 PM

## 2025-02-12 NOTE — PROGRESS NOTES
[facility]    Medicine Progress Note - Hospitalist Service    Date of Admission:  2/11/2025    Assessment & Plan     49 year old female with a past medical history of multiple sclerosis, hypertension, diastolic CHF presented to the ED by ambulance for evaluation post fall       1.Status post fall  -Generalized weakness most likely multifactorial and possible subacute to acute cva  -at high risk falls with MS  -lives alone  -severe edema legs  -mri see below    2.Possible acute to subacute CVA  -per mri here   -1.  There is a punctate focus of acute or early subacute infarct within the posterior paramedian right frontal white matter.  2.  There are several small chronic infarcts within the cerebellar hemispheres. A single linear infarct in the inferolateral right cerebellum may be more recent.  3.  T2 hyperintensities within the white matter probably reflect a combination of chronic demyelinating plaques given the provided history of multiple sclerosis, and chronic small vessel ischemic changes. No abnormal enhancement to indicate active   demyelination in the brain.  -neurology consulted and not clear if this is true cva vs artifact in MS pt  -neuro recs--adding plavix x 3 weeks and I have added statin  -continue asa    3.MS with ongoing leg weakness, complex with edema  -per neuro they will consider more mri of spine  -she is not on any med for MS, dx >20 years ago    4.Severe edema legs with suspected severe right sided heart failure exacerbation  -volume up severe  -echo  -iv bumex  -appreciate cards seeing  -tele  -doppler legs    5.Pulm HTN  -suggested on scans  -ct neg for pe  -diureses    6.Chronic hypoxic resp failure  -on O2 at home,at 3 liters  -she notes she follows with pulm here,   -will ask pulm to see here    7.Possible UTI  -ceftriaxone and follow uc    8.Lactic acidosis   -improved    9.Elevated trop  -echo  -tele  -no cp  -getting cards to  "see    10.Hyperbilirubinemia  -checked abd ultrasound     11.Right sided heart failure exacerbation  -iv bumex  -cards  -tele  - Bumex, Aldactone, Jardiance    12.Morbid obesity  -just started Wegovy     13.Mood d/o  - Lexapro, will continue    14.Code-full     I called sister to update and she too is worried about her too. I called at 1221            Diet: Combination Diet Low Saturated Fat Na <2400mg Diet, No Caffeine Diet  NPO for Medical/Clinical Reasons Except for: Meds, Ice Chips  Fluid restriction 1800 ML FLUID    DVT Prophylaxis: Heparin SQ  Brar Catheter: Not present  Lines: None     Cardiac Monitoring: ACTIVE order. Indication: Stroke, acute (48 hours)  Code Status: Full Code      Clinically Significant Risk Factors Present on Admission          # Hypochloremia: Lowest Cl = 97 mmol/L in last 2 days, will monitor as appropriate   # Hypercalcemia: Highest Ca = 10.7 mg/dL in last 2 days, will monitor as appropriate    # Hypoalbuminemia: Lowest albumin = 3.2 g/dL at 2/11/2025  1:48 PM, will monitor as appropriate   # Drug Induced Platelet Defect: home medication list includes an antiplatelet medication   # Hypertension: Noted on problem list  # Chronic heart failure with preserved ejection fraction: heart failure noted on problem list and last echo with EF >50%          # Severe Obesity: Estimated body mass index is 56.64 kg/m  as calculated from the following:    Height as of this encounter: 1.626 m (5' 4\").    Weight as of this encounter: 149.7 kg (330 lb).       # Financial/Environmental Concerns: none         Social Drivers of Health    Physical Activity: Inactive (3/21/2024)    Exercise Vital Sign     Days of Exercise per Week: 0 days     Minutes of Exercise per Session: 0 min   Social Connections: Unknown (3/1/2022)    Social Connection and Isolation Panel [NHANES]     Frequency of Communication with Friends and Family: Twice a week     Frequency of Social Gatherings with Friends and Family: Twice a " week          Disposition Plan     Medically Ready for Discharge: Anticipated in 5+ Days             Bianca Combs MD  Hospitalist Service  [facility]  Securely message with [employer] (more info)  Text page via [employer] Paging/Directory   ______________________________________________________________________    Interval History   She lives alone  Notes 3 liters O2  Felt more weakness left leg  Struggles with edema and ambulating       Physical Exam   Vital Signs: Temp: 98.4  F (36.9  C) Temp src: Oral BP: 111/66 Pulse: 104   Resp: 29 SpO2: (!) 90 % O2 Device: Nasal cannula Oxygen Delivery: 2 LPM  Weight: 330 lbs 0 oz    Constitutional: awake, alert, cooperative, and no apparent distress  Eyes: sclera clear  Respiratory: no increased work of breathing, moderate air exchange, no retractions, and diminished breath sounds right base and left base  Cardiovascular: regular rate and rhythm and normal S1 and S2  GI: normal bowel sounds, soft, non-distended, and non-tender  Skin: legs severe edema and erythema , with nodules all over legs   Musculoskeletal: edema very severe legs  Neurologic: Mental Status Exam:  Level of Alertness:   awake  Fund of Knowledge:  normal  Attention/Concentration:  normal  Language:  normal  Motor Exam:  difficult to move legs, hard to tell if just edema, or MS or other   Hand  5/5, speech fluent   Neuropsychiatric: Affect: normal and pleasant    Medical Decision Making       50 MINUTES SPENT BY ME on the date of service doing chart review, history, exam, documentation & further activities per the note.      Data     I have personally reviewed the following data over the past 24 hrs:    7.2  \   14.7   / 181     138 102 21.7 (H) /  93   3.9 23 0.68 \     ALT: 9 AST: 31 AP: 65 TBILI: 2.7 (H)   ALB: 3.5 TOT PROTEIN: 7.7 LIPASE: 33     Trop: 98 (H) BNP: 7,880 (H)     TSH: 3.52 T4: N/A A1C: 5.6     Procal: 0.65 (H) CRP: N/A Lactic Acid: 2.0         Imaging results  reviewed over the past 24 hrs:   Recent Results (from the past 24 hours)   Head CT w/o contrast    Narrative    EXAM: CT HEAD W/O CONTRAST  LOCATION: [facility]  DATE: 2/11/2025    INDICATION: Weakness, fall.  COMPARISON: CT of the head dated 2/9/2022.  TECHNIQUE: Routine CT Head without IV contrast. Multiplanar reformats. Dose reduction techniques were used.    FINDINGS:  INTRACRANIAL CONTENTS: No intracranial hemorrhage, extraaxial collection, or mass effect.  There are a few small nonspecific hypoattenuating lesions in the right cerebellar hemisphere (series 4 image 9), not definitively present on the previous exam.   These raise concern for age-indeterminate infarcts, possibly chronic. Brain parenchymal attenuation otherwise appears within normal limits for age. Mild generalized volume loss. No hydrocephalus.     VISUALIZED ORBITS/SINUSES/MASTOIDS: No intraorbital abnormality. No paranasal sinus mucosal disease. No middle ear or mastoid effusion.    BONES/SOFT TISSUES: There is a presumed focal hematoma overlying the right occipital calvarium involving the occipital/suboccipital scalp with mild surrounding subcutaneous fat stranding, likely representing contusion given the patient's history of   fall/trauma. No underlying displaced calvarial fracture identified.      Impression    IMPRESSION:  1.  No acute intracranial hemorrhage, mass effect, or hydrocephalus.  2.  A few small nonspecific hypoattenuating lesions in the right cerebellar hemisphere, not definitively present on the previous exam. These raise concern for age-indeterminate infarcts, possibly chronic.  3.  Right occipital/suboccipital scalp contusion without underlying displaced calvarial fracture.     CT Chest w Contrast    Narrative    EXAM: CT CHEST W CONTRAST  LOCATION: [facility]  DATE: 2/11/2025    INDICATION: Hypoxia, crackles on lung exam, fever  COMPARISON: CT pulmonary angiogram  7/18/2024  TECHNIQUE: CT chest with IV contrast. Multiplanar reformats were obtained. Dose reduction techniques were used.    CONTRAST: isovue 370 90ml    FINDINGS:   LUNGS AND PLEURA: Detailed assessment of the lung parenchyma is suboptimal due to respiratory motion-related blurring. No airspace opacities or interlobular septal thickening. Tiny benign calcified granulomata are present in both lung bases. Central   airways are patent. No pleural effusion.    MEDIASTINUM: There are mitral annular calcifications. Left heart chambers are normal in size. Dilated right ventricle with normal right atrium. The main pulmonary artery is severely enlarged measuring 4.5 cm in diameter. The right and left pulmonary   arteries and their proximal branches are also dilated and subsegmental pulmonary arteries have mild tortuosity. No pulmonary artery filling defects, calcifications, or webs. Caliber of the pulmonary veins is normal. No enlarged mediastinal or hilar lymph   nodes. Mild heterogeneity of the thyroid gland but no discrete nodules. Esophagus is decompressed.    CORONARY ARTERY CALCIFICATION: Cannot evaluate. Motion-related blurring precludes assessment of coronary calcification.    UPPER ABDOMEN: Prior cholecystectomy.    MUSCULOSKELETAL: Small diffuse thoracic spine degenerative osteophytes. No aggressive or destructive bone lesions.      Impression    IMPRESSION:     1.  Severely enlarged main pulmonary artery which is associated with pulmonary hypertension. No findings to suggest acute or chronic pulmonary embolism.  2.  Dilated right ventricle, likely secondary to #1.  3.  No acute lung parenchymal or pleural space process.     US Abdomen Limited    Narrative    EXAM: US ABDOMEN LIMITED  LOCATION: [facility]  DATE: 2/11/2025    INDICATION: abnormal lfts  COMPARISON: CT 7/18/2024  TECHNIQUE: Limited abdominal ultrasound.    FINDINGS:  Technically difficult examination due to patient body  habitus.    GALLBLADDER: Surgically removed.    BILE DUCTS: No biliary dilatation. The common duct measures 4 mm.    LIVER: Normal parenchyma with smooth contour. No focal mass. The portal vein is patent with flow in the normal direction.    RIGHT KIDNEY: No hydronephrosis.    PANCREAS: The visualized portions are normal.    No ascites.      Impression    IMPRESSION:  No acute findings or explanation for elevated liver function tests.       MR Brain w/o & w Contrast    Narrative    MR BRAIN W/O and W CONTRAST  [facility]  2/11/2025 8:15 PM CST    INDICATION: Multiple sclerosis.  TECHNIQUE: Noncontrast and contrast enhanced MRI of the brain.  CONTRAST: 15 ml Gadavist  COMPARISON: Head CT 2/11/2025    FINDINGS: There is a punctate focus of restricted diffusion within the posterior paramedian right frontal white matter consistent with a tiny recent acute or early subacute infarct. There are several small infarcts within the bilateral cerebellar   hemispheres. Most of these have a chronic appearance. A single linear infarct in the inferolateral right cerebellum demonstrates some subtle diffusion hyperintensity and may be more recent. Paranasal sinuses are free from significant disease. Mastoid air   cells appear free from significant disease. Intraorbital contents are unremarkable. Ventricles are within normal limits in size for the patient's age. Intracranial flow voids are intact. There is no mass effect, midline shift, or extra-axial collection.   Mild periventricular T2 hyperintensity most consistent with chronic small vessel ischemic related gliosis. No evidence for acute or chronic intracranial blood products.      Impression    IMPRESSION:   1.  There is a punctate focus of acute or early subacute infarct within the posterior paramedian right frontal white matter.  2.  There are several small chronic infarcts within the cerebellar hemispheres. A single linear infarct in the inferolateral right  cerebellum may be more recent.  3.  T2 hyperintensities within the white matter probably reflect a combination of chronic demyelinating plaques given the provided history of multiple sclerosis, and chronic small vessel ischemic changes. No abnormal enhancement to indicate active   demyelination in the brain.

## 2025-02-12 NOTE — CONSULTS
Received consult indicating patient will likely need TCU at discharge. Care management team is following to assist with safe discharge planning. Will meet with patient to further discuss discharge planning once seen by PT and OT to discuss recommended disposition.       KAY Florian at 3:27 PM on 02/12/25

## 2025-02-12 NOTE — CONSULTS
[facility]  WOC Nurse Inpatient Assessment     Consulted for: BLE    Summary: Patient with chronic BLE skin changes and chronic edema    WOC nurse follow-up plan: signing off    Patient History (according to provider note(s):    Assessment:  Bess Enamorado is a 49 year old female with a past medical history of multiple sclerosis, hypertension, CHF presented to the ED by ambulance for evaluation post fall,  CT scan of the chest was negative for acute process, CT scan of the head was also performed which showed no acute process but showed  A few small nonspecific hypoattenuating lesions in the right cerebellar hemisphere, not definitively present on the previous exam. These raise concern for age-indeterminate infarcts, possibly chronic. Right occipital/suboccipital scalp contusion without underlying displaced calvarial fracture.  Patient is going to be admitted post fall, PT and OT evaluation, possible placement and to rule out MS flare versus other CNS pathology.      Assessment:    No wounds present but patient with severe lymphedema, xerosis and nodular growths (LLE>RLE).      LLE                                                            LLE    RLE    Chronic skin changes/color changes - does not appear to be cellulitis at this time. Minimal to no pain with palpation unless deep pressure placed on BLE.   No drainage noted at this time.    Treatment Plan:   BLE - daily or with lymphedema wrap changes if less frequent than daily  Cleanse with Vashe (moisten gauze with Vashe and place over affected areas for 10 - 15 minutes); gently dry or air dry.  Apply Once a Day or Sween 24 moisturizer (pink and white tube).    Orders: Written    RECOMMEND PRIMARY TEAM ORDER: None, at this time  Education provided: plan of care  Discussed plan of care with: Patient and Nurse  Notify WOC if wound(s) deteriorate.  Nursing to notify the Provider(s) and re-consult the WOC Nurse if new skin concern.    DATA:      Current support surface: Standard  ED cart  Containment of urine/stool: Incontinence Protocol and Suction based external urinary catheter   BMI: Body mass index is 56.64 kg/m .   Active diet order: Orders Placed This Encounter      Combination Diet Low Saturated Fat Na <2400mg Diet, No Caffeine Diet     Output: I/O last 3 completed shifts:  In: 1300 [IV Piggyback:1100]  Out: 3250 [Urine:3250]     Labs:   Recent Labs   Lab 02/12/25  0720   ALBUMIN 3.5   HGB 14.7   WBC 7.2   A1C 5.6     Pressure injury risk assessment:   Sensory Perception: 3-->slightly limited  Moisture: 3-->occasionally moist  Activity: 2-->chairfast  Mobility: 2-->very limited  Nutrition: 2-->probably inadequate  Friction and Shear: 2-->potential problem  Ben Score: 14    Laxmi Stroud MSN RN CWOCN  Pager no longer is use, please contact through [employer] group: [facility] [employer] [facility]

## 2025-02-12 NOTE — PLAN OF CARE
Goal Outcome Evaluation:      Plan of Care Reviewed With: patient          Outcome Evaluation: Discharge plan to be determined pending medical progression and PT/OT recommendations.

## 2025-02-12 NOTE — CONSULTS
[facility] Consultation by Ahmet    Bess Enamorado MRN# [id number]   Age: 49 year old YOB: 1975     Date of Admission:  2/11/2025    Reason for consult: Chronic hypoxia on home oxygen and CPAP       Requesting physician: Dr. Bianca Combs                    Assessment and Plan:   Assessment:   Chronic respiratory failure with hypercapnia and hypoxia: She chronically wears 3L per NC. Follows with Dr. Chaudhry at our pulmonary clinic. Last seen 9/11/2024.   Severe NARCISA with OHS on Auto-PAP 10-16cm: Sleep study from 2017 showed AHI 45.9 events/hr and an oxygen desat mike of 54%. She is reports compliant use.   Severe Pulmonary hypertension: Echo this admission shows PAP 70-75 mmH with RAP, dilated IVC; severely dilated RV with severely reduced RV systolic performance.   HFpEF/severe RV Dysfunction/Cor Pulmonale   Remote hx of PE in 2022 - resolved. None seen on most recent imaging.   Severe obesity: patient started on Wegovy recently.   Presented with fall; underlying hx of MS with left leg weakness. MRI showed punctate diffusion abnormality in right fontal white matter. Being seen by Neurology.   Anasarca in setting decompensated heart failure   UTI - culture with Gram negative bacilli       Plan:   Agree with aggressive diuretic regimen as directed by Cardiology.   Follow renal function closely as we push diuresis.   Supplemental O2 for saturations > 90%; avoid hyperoxia.   Continue Auto-PAP 10-16 with oxygen bleed in.   Abx with CTX for UTI   Has follow up with Dr. Chaudhry this spring.   Patient is very near her pulmonary baseline. Agree with plan as laid out by Cardiology and Primary team. Our service will not follow.            History is obtained from the patient         History of Present Illness:   Bess Enamorado is a 49 year old woman with PMH of chronic hypoxic and hypercapnic respiratory failure on chronic 3L NC O2, severe pulmonary hypertension, severe NARCISA with OHS on Auto-PAP,  Severe RV dysfunction, severe obesity, MS. Presented to ER 2/11/2025 after being found down by her cleaning lady. She tells me she had just recently started Wegovy (has had 2 doses) and had not been feeling well - feverish, run down. She tried to take a shower, but when she tried to get up, her left leg gave out and she fell. She had no way of contacting anyone, and so was down for a day and a half before her cleaning lady found her. In ER she was found to be mildly hypoxic on low flow oxygen, significantly edematous, normotensive. CT of the Head did not show any acute process but did show a few small nonspecific hypoattenuating lesions in the right cerebellar hemisphere , CT of the chest did not show any acute PE. UA suggested UTI and she was started on abx. She was admitted and started on treatment for decompensated heart failure.   On exam today, Bess is awake and interactive, Pleasant. She states she feels pretty good - no shortness of breath, no cough, no chest tightness. She is wearing her CPAP nightly and does sleep in a recliner. She has no complaints regarding her respiratory status and thinks she is pretty close to her baseline. She is worried about being diuresed and how that will affect her kidneys.               Past Medical History:   I have reviewed this patient's past medical history  Past Medical History:   Diagnosis Date    Acute on chronic diastolic congestive heart failure (H) 02/09/2022    Arthritis 2019    Hips    ASCUS favor benign 08/2015    Neg HPV    Depressive disorder 2010    H/O colposcopy with cervical biopsy 09/14/2015    Bx & ECC - negative    Hypertension 2019    LSIL on Pap smear 07/2014    Neg high risk HPV    Multiple sclerosis (H) 08/29/2005 9/18/200pt dx with MS 2004--dx by neurolgist and is followed by Dr. Harris    Myocardial infarction (H)     Obese     Pneumonia due to infectious organism, unspecified laterality, unspecified part of lung 02/08/2022    Sepsis (Temp 101,  , WBC 13.0) 10/23/2018    Shingles 10/2016    SIRS (systemic inflammatory response syndrome) (H) 03/04/2021    Sleep apnea     UNSPEC CONSTIPATION 09/18/2006 9/18/2006   Has tried otc suppository and otc lax.              Past Surgical History:     Past Surgical History:   Procedure Laterality Date    CHOLECYSTECTOMY, LAPOROSCOPIC      Cholecystectomy, Laparoscopic    COLONOSCOPY  2007?    Bathroom issue..results normal    COMBINED CYSTOSCOPY, RETROGRADES, EXCHANGE STENT URETER(S) Right 2/21/2022    Procedure: CYSTOSCOPY, WITH right RETROGRADE PYELOGRAM AND right URETERAL STENT PLACEMENT;  Surgeon: Doyle Palomares MD;  Location: [facility] OR    OPEN REDUCTION INTERNAL FIXATION TOE(S)  4/13/2012    Procedure:OPEN REDUCTION INTERNAL FIXATION TOE(S); Open reduction internal fixation right proximal fifth metatarsal fracture-   Anes-choice block; Surgeon:LEY, JEFFREY DUANE; Location:[facility] OR    PICC SINGLE LUMEN PLACEMENT  3/20/2024    PICC TRIPLE LUMEN PLACEMENT  2/21/2022                  Social History:     Social History     Tobacco Use    Smoking status: Never    Smokeless tobacco: Never   Substance Use Topics    Alcohol use: Yes     Comment: social             Family History:     Family History   Problem Relation Age of Onset    Neurologic Disorder Father         MS    Heart Disease Father         irregular heart rate    Diabetes Father     Melanoma Father     Sleep Apnea Father     Other Cancer Father     Cancer Maternal Grandfather         lung    Other Cancer Maternal Grandfather     Cancer Paternal Grandmother         stomach    Other Cancer Paternal Grandmother     Cancer Mother     Other Cancer Mother     Thyroid Disease Mother     Gynecology Maternal Aunt         PCOS    Hypertension Maternal Grandmother     Anxiety Disorder Maternal Grandmother     Thyroid Disease Maternal Grandmother     Anxiety Disorder Sister               Allergies:      No Known Allergies          Medications:     Current  Facility-Administered Medications   Medication Dose Route Frequency Provider Last Rate Last Admin    acetaminophen (TYLENOL) tablet 650 mg  650 mg Oral Q4H PRN Gondal, Saad J, MD   650 mg at 02/12/25 0824    Or    acetaminophen (TYLENOL) Suppository 650 mg  650 mg Rectal Q4H PRN Gondal, Saad J, MD        aspirin EC tablet 81 mg  81 mg Oral Daily Gondal, Saad J, MD   81 mg at 02/12/25 0842    bumetanide (BUMEX) injection 2 mg  2 mg Intravenous Q12H Raúl Sanchez MD        [Held by provider] bumetanide (BUMEX) tablet 6 mg  6 mg Oral Daily Gondal, Saad J, MD   6 mg at 02/12/25 0842    calcium carbonate (TUMS) chewable tablet 1,000 mg  1,000 mg Oral 4x Daily PRN Gondal, Saad J, MD        cefTRIAXone (ROCEPHIN) 1 g vial to attach to  mL bag for ADULTS or NS 50 mL bag for PEDS  1 g Intravenous Q24H Gondal, Saad J, MD        [START ON 2/13/2025] clopidogrel (PLAVIX) tablet 75 mg  75 mg Oral Daily Eric Blevins MD        empagliflozin (JARDIANCE) tablet 10 mg  10 mg Oral Daily Gondal, Saad J, MD   10 mg at 02/12/25 0842    escitalopram (LEXAPRO) tablet 10 mg  10 mg Oral Daily Gondal, Saad J, MD   10 mg at 02/12/25 0842    heparin ANTICOAGULANT injection 5,000 Units  5,000 Units Subcutaneous Q8H Bianca Combs MD   5,000 Units at 02/12/25 1217    HYDROmorphone (DILAUDID) injection 0.2 mg  0.2 mg Intravenous Q2H PRN Gondal, Saad J, MD        HYDROmorphone (DILAUDID) injection 0.4 mg  0.4 mg Intravenous Q2H PRN Gondal, Saad J, MD        lidocaine (LMX4) cream   Topical Q1H PRN Bianca Combs MD        lidocaine (LMX4) cream   Topical Q1H PRN Gondal, Saad J, MD        lidocaine 1 % 0.1-1 mL  0.1-1 mL Other Q1H PRN Bianca Combs MD        lidocaine 1 % 0.1-1 mL  0.1-1 mL Other Q1H PRN Gondal, Saad J, MD        naloxone (NARCAN) injection 0.2 mg  0.2 mg Intravenous Q2 Min PRN Gondal, Saad J, MD        Or    naloxone (NARCAN) injection 0.4 mg  0.4 mg Intravenous Q2 Min PRN Gondal, Saad J, MD         Or    naloxone (NARCAN) injection 0.2 mg  0.2 mg Intramuscular Q2 Min PRN Gondal, Saad J, MD        Or    naloxone (NARCAN) injection 0.4 mg  0.4 mg Intramuscular Q2 Min PRN Gondal, Saad J, MD        ondansetron (ZOFRAN ODT) ODT tab 4 mg  4 mg Oral Q6H PRN Gondal, Saad J, MD        Or    ondansetron (ZOFRAN) injection 4 mg  4 mg Intravenous Q6H PRN Gondal, Saad J, MD        oxyCODONE (ROXICODONE) tablet 5 mg  5 mg Oral Q4H PRN Gondal, Saad J, MD        oxyCODONE IR (ROXICODONE) half-tab 2.5 mg  2.5 mg Oral Q4H PRN Gondal, Saad J, MD        senlarissa-docusate (SENOKOT-S/PERICOLACE) 8.6-50 MG per tablet 1 tablet  1 tablet Oral BID PRN Gondal, Saad J, MD        Or    senna-docusate (SENOKOT-S/PERICOLACE) 8.6-50 MG per tablet 2 tablet  2 tablet Oral BID PRN Gondal, Saad J, MD        simvastatin (ZOCOR) tablet 10 mg  10 mg Oral At Bedtime Bianca Combs MD        sodium chloride (PF) 0.9% PF flush 3 mL  3 mL Intracatheter Q8H Bianca Combs MD   3 mL at 02/12/25 1221    sodium chloride (PF) 0.9% PF flush 3 mL  3 mL Intracatheter q1 min prn Bianca Combs MD        sodium chloride (PF) 0.9% PF flush 3 mL  3 mL Intracatheter Q8H Gondal, Saad J, MD   3 mL at 02/12/25 0317    sodium chloride (PF) 0.9% PF flush 3 mL  3 mL Intracatheter q1 min prn Gondal, Saad J, MD        spironolactone (ALDACTONE) half-tab 12.5 mg  12.5 mg Oral Daily Gondal, Saad J, MD   12.5 mg at 02/12/25 0842     Current Outpatient Medications   Medication Sig Dispense Refill    acetaminophen (TYLENOL) 650 MG CR tablet Take 650 mg by mouth every 8 hours as needed for mild pain or fever      aspirin (ASPIRIN LOW DOSE) 81 MG EC tablet Take 1 tablet (81 mg) by mouth daily      bumetanide (BUMEX) 2 MG tablet Take 3 tablets (6 mg) by mouth daily. 360 tablet 1    Emollient (GOLD BOND MEDICATED BODY EX) Externally apply 1 Application topically 2 times daily as needed      empagliflozin (JARDIANCE) 10 MG TABS tablet Take 1 tablet (10 mg) by mouth daily  90 tablet 1    escitalopram (LEXAPRO) 10 MG tablet Take 1 tablet (10 mg) by mouth daily. 90 tablet 1    potassium chloride sheila ER (KLOR-CON M10) 10 MEQ CR tablet Take 3 tablets (30 mEq) by mouth daily. 90 tablet 3    semaglutide-weight management (WEGOVY) 0.25 MG/0.5ML pen Inject 0.5 mLs (0.25 mg) subcutaneously once a week. 2 mL 1    spironolactone (ALDACTONE) 25 MG tablet Take 0.5 tablets (12.5 mg) by mouth daily. 45 tablet 2             Review of Systems:   The Review of Systems is negative other than noted in the HPI          Physical Exam:   Temp: 98.4  F (36.9  C) Temp src: Oral BP: 111/66 Pulse: 104   Resp: 29 SpO2: (!) 90 %      Gen: Awake and alert. No distress   HEENMT: AT/NC  Neuro: Grossly nonfocal.   Pulm: Unlabored on NC; lungs are clear anteriorly. No wheeze. No cough on this exam.   CV: RRR S1S2  GI: Soft ntnd   Integ: anasarca  Psych: calm          Data:   All laboratory data reviewed  Echo: 1. Technically difficult study.  2. The left ventricle is normal in size.Image quality does not provide for  detailed assessment of LV systolic function, but is felt to be normal with a  visually estimated ejection fraction of roughly 65%.  3. There is abnormal septal motion c/w right ventricular overload.  4. Probable mild-moderate tricuspid insufficiency (the color Doppler tricuspid  insufficiency jet is poorly visualized).  5. There is severe right ventricular enlargement with severely reduced right  ventricular systolic performance.  6. The left atrium appears enlarged though difficult to quantify due to  inadequate visualization. There is severe right atrial enlargement.  7. Right ventricular systolic pressure relative to right atrial pressure is  moderate-severely increased. The pulmonary artery pressure is estimated to be  70-75 mmHg plus right atrial pressure. In addition, the IVC appears dilated  with decreased phasic variation in caval diameter consistent with elevated  right atrial pressure.     When  compared to the prior real-time echocardiogram dated 19 July 2024, the  pulmonary artery pressure estimate measures slightly less on the current  study. The findings are otherwise felt to be fairly similar on both  examinations (although the right ventricular size was previously described as  only mildly enlarged, it is this reader's impression that right ventricular  size was severely increased on the prior study with concomitant severe  depression and right ventricular systolic performance).  _____________________________________________  2/11 CT Head - 1.  No acute intracranial hemorrhage, mass effect, or hydrocephalus.  2.  A few small nonspecific hypoattenuating lesions in the right cerebellar hemisphere, not definitively present on the previous exam. These raise concern for age-indeterminate infarcts, possibly chronic.  3.  Right occipital/suboccipital scalp contusion without underlying displaced calvarial fracture.    2/11 CT Chest - 1.  Severely enlarged main pulmonary artery which is associated with pulmonary hypertension. No findings to suggest acute or chronic pulmonary embolism.  2.  Dilated right ventricle, likely secondary to #1.  3.  No acute lung parenchymal or pleural space process.    MR Brain - 1.  There is a punctate focus of acute or early subacute infarct within the posterior paramedian right frontal white matter.  2.  There are several small chronic infarcts within the cerebellar hemispheres. A single linear infarct in the inferolateral right cerebellum may be more recent.  3.  T2 hyperintensities within the white matter probably reflect a combination of chronic demyelinating plaques given the provided history of multiple sclerosis, and chronic small vessel ischemic changes. No abnormal enhancement to indicate active   demyelination in the brain.      Alanis Brar, JULIETA CNP

## 2025-02-12 NOTE — MEDICATION SCRIBE - ADMISSION MEDICATION HISTORY
Medication Scribe Admission Medication History    Admission medication history is complete. The information provided in this note is only as accurate as the sources available at the time of the update.    Information Source(s): Patient via in-person    Pertinent Information: patient reports self management of medications.    Patient reports no longer taking: Metoprolol, Micatin, Vagisil, Florastor    Patient reports that during the past two weeks she has only taken: Potassium, Bumex, and Wegovy     Changes made to PTA medication list:  Added: None  Deleted: Metoprolol, Micatin, Vagisil, Florastor  Changed: None    Allergies reviewed with patient and updates made in EHR: yes    Medication History Completed By: Elias Gibbs 2/11/2025 6:02 PM    PTA Med List   Medication Sig Note Last Dose/Taking    acetaminophen (TYLENOL) 650 MG CR tablet Take 650 mg by mouth every 8 hours as needed for mild pain or fever  Taking As Needed    aspirin (ASPIRIN LOW DOSE) 81 MG EC tablet Take 1 tablet (81 mg) by mouth daily  Past Month Morning    bumetanide (BUMEX) 2 MG tablet Take 3 tablets (6 mg) by mouth daily.  Past Week Morning    Emollient (GOLD BOND MEDICATED BODY EX) Externally apply 1 Application topically 2 times daily as needed  Taking As Needed    empagliflozin (JARDIANCE) 10 MG TABS tablet Take 1 tablet (10 mg) by mouth daily  Past Month Morning    escitalopram (LEXAPRO) 10 MG tablet Take 1 tablet (10 mg) by mouth daily.  Past Month Morning    potassium chloride sheila ER (KLOR-CON M10) 10 MEQ CR tablet Take 3 tablets (30 mEq) by mouth daily.  Past Week Morning    semaglutide-weight management (WEGOVY) 0.25 MG/0.5ML pen Inject 0.5 mLs (0.25 mg) subcutaneously once a week. 2/11/2025: Wednesdays 2/5/2025    spironolactone (ALDACTONE) 25 MG tablet Take 0.5 tablets (12.5 mg) by mouth daily.  Past Month Morning

## 2025-02-12 NOTE — PHARMACY-CONSULT NOTE
Pharmacy Consult to evaluate for medication related stroke core measures    Bess Enamorado, 49 year old female admitted for fall, acute/subacute CVA on 2/11/2025.    Thrombolytic was not given because of Time from onset contraindications    VTE Prophylaxis Heparin given on 2/12/25 as appropriate prior to end of hospital day 2.    Antithrombotic: aspirin and clopidogrel started on 2/12/25, as appropriate by end of hospital day 2. Continue antithrombotic therapy on discharge to meet quality measures, unless contraindicated.    Anticoagulation if history of A-fib/flutter: Patient does not have history of A-fib/flutter - anticoagulation not required for medication related stroke core measures.     LDL Cholesterol Calculated   Date Value Ref Range Status   02/12/2025 43 <100 mg/dL Final   02/11/2016 75 <100 mg/dL Final     Comment:     Desirable:       <100 mg/dl       Patient currently receiving Zocor (simvastatin) continue statin on discharge to meet quality measures, unless contraindicated.    Recommendations: None at this time    Thank you for the consult.    PAZ ATKINS RP 2/12/2025 3:02 PM

## 2025-02-12 NOTE — CONSULTS
"HEART CARE NOTE        Thank you, Dr. Combs, for asking the [facility] Heart Care team to see Bess Enamorado to evaluate ADHF.    Assessment/Recommendations   1. HFpEF/RV dysfunction c/b severe ADHF  Assessment / Plan  Profoundly hypervolemic on physical exam; initiate split IV diuretic dosing and continue to monitor UOP and renal function closely; give IV albumin bolus given the degree of 3rd spacing  Patient is at increased risk for adverse cardiac events 2/2 non-compliance, morbid obesity, renal dysfunction     2. CKD  Assessment / Plan  Follows with [facility]  Diuresis as above; renal function wnl; continue to monitor UOP and renal function closely     3. HTN  Assessment / Plan   Adequately controlled - no additional recommendations at this time     4. NARCISA  Assessment / Plan  CPAP at bedtime     5. PE c/b Core pulmonale + pulmonary HTN  Assessment / Plan  Hx of PE; not currently on AC - defer management to primary  C/b Core pulmonale; pulm Htn likely multifactorial given hx of PE and current WHO Group 2 2/2 volume overload - repeat echo oncer near euvolemia     Clinically Significant Risk Factors Present on Admission          # Hypochloremia: Lowest Cl = 97 mmol/L in last 2 days, will monitor as appropriate   # Hypercalcemia: Highest Ca = 10.7 mg/dL in last 2 days, will monitor as appropriate    # Hypoalbuminemia: Lowest albumin = 3.2 g/dL at 2/11/2025  1:48 PM, will monitor as appropriate   # Drug Induced Platelet Defect: home medication list includes an antiplatelet medication   # Hypertension: Noted on problem list  # Chronic heart failure with preserved ejection fraction: heart failure noted on problem list and last echo with EF >50%          # Severe Obesity: Estimated body mass index is 56.64 kg/m  as calculated from the following:    Height as of this encounter: 1.626 m (5' 4\").    Weight as of this encounter: 149.7 kg (330 lb).       # Financial/Environmental Concerns: none  "       Cardiomyopathy  Combined acute    Fluid overload, unspecified, Other fluid overload, and Other disorders of electrolyte and fluid balance, not elsewhere classified    CKD POA List: Stage 3a (GFR 45-59)    Pulmonary Embolism: Other pulmonary embolism with acute cor pulmonale    Pulmonary Heart Disease (Pulmonary hypertension or Cor pulmonale): Pulmonary Hypertension, unspecified  Cor pulmonale    90 minutes spent reviewing prior records (including documentation, laboratory studies, cardiac testing/imaging), history and physical exam, planning, and subsequent documentation.      History of Present Illness/Subjective    Ms. Bess Enamorado is a 49 year old female with a PMHx significant for (per Epic notation) multiple sclerosis, hypertension, CHF presented to the ED by ambulance for evaluation post fall    Today, Mrs. Enamorado reports she presents 2/2 fall, denies LOC; denies acute cardiac events or complaints; Management plan as detailed above    ECG: Personally reviewed. normal sinus rhythm, nonspecific ST and T waves changes, incomplete RBBB, right axis deviation.    ECHO (personnaly Reviewed on 2/12/25): repeat study pending  Hyperdynamic left ventricular function.The visual ejection fraction is >70%.  Mildly decreased right ventricular systolic function.The right ventricle is  moderate to severely dilated.  Moderate bi-atrial enlargement.  There is severe mitral annular calcification.There is mild mitral  stenosis.There is mild to moderate (1-2+) tricuspid regurgitation.  Severe pulmonary hypertension- RVSP 82 mm hg +RA.  IVC diameter >2.1 cm collapsing <50% with sniff suggests a high RA pressure  estimated at 15 mmHg or greater.     No obvious vegetations noted within the limits of a transthoracic  echocardiogram.  Can consider INNA if high clinical suspicion of endocarditis.    Telemetry: personally reviewed February 12, 2025; notable for sinus      Lab results: personally reviewed February 12, 2025; notable for  "renal function wnl    Medical history and pertinent documents reviewed in Care Everywhere please where applicable see details above          Physical Examination Review of Systems   /66   Pulse 104   Temp 98.4  F (36.9  C) (Oral)   Resp 29   Ht 1.626 m (5' 4\")   Wt (!) 149.7 kg (330 lb)   SpO2 (!) 90%   BMI 56.64 kg/m    Body mass index is 56.64 kg/m .  Wt Readings from Last 3 Encounters:   02/11/25 (!) 149.7 kg (330 lb)   01/13/25 140.6 kg (310 lb)   09/11/24 135.2 kg (298 lb)     General Appearance:   no distress, normal body habitus   ENT/Mouth: membranes moist, no oral lesions or bleeding gums.      EYES:  no scleral icterus, normal conjunctivae   Neck: no carotid bruits or thyromegaly   Chest/Lungs:   lungs are clear to auscultation, no rales or wheezing, equal chest wall expansion    Cardiovascular:   Regular. Normal first and second heart sounds with no murmurs, rubs, or gallops; the carotid, radial and posterior tibial pulses are intact, + JVD and anasarca    Abdomen:  no organomegaly, masses, bruits, or tenderness; bowel sounds are present   Extremities: no cyanosis or clubbing   Skin: no xanthelasma, warm.    Neurologic: NAD     Psychiatric: alert and oriented x3, calm     A complete 10 systems ROS was reviewed  And is negative except what is listed in the HPI.          Medical History  Surgical History Family History Social History   Past Medical History:   Diagnosis Date    Acute on chronic diastolic congestive heart failure (H) 02/09/2022    Arthritis 2019    Hips    ASCUS favor benign 08/2015    Neg HPV    Depressive disorder 2010    H/O colposcopy with cervical biopsy 09/14/2015    Bx & ECC - negative    Hypertension 2019    LSIL on Pap smear 07/2014    Neg high risk HPV    Multiple sclerosis (H) 08/29/2005 9/18/200pt dx with MS 2004--dx by neurolgist and is followed by Dr. Harris    Myocardial infarction (H)     Obese     Pneumonia due to infectious organism, unspecified laterality, " unspecified part of lung 02/08/2022    Sepsis (Temp 101, , WBC 13.0) 10/23/2018    Shingles 10/2016    SIRS (systemic inflammatory response syndrome) (H) 03/04/2021    Sleep apnea     UNSPEC CONSTIPATION 09/18/2006 9/18/2006   Has tried otc suppository and otc lax.     Past Surgical History:   Procedure Laterality Date    CHOLECYSTECTOMY, LAPOROSCOPIC      Cholecystectomy, Laparoscopic    COLONOSCOPY  2007?    Bathroom issue..results normal    COMBINED CYSTOSCOPY, RETROGRADES, EXCHANGE STENT URETER(S) Right 2/21/2022    Procedure: CYSTOSCOPY, WITH right RETROGRADE PYELOGRAM AND right URETERAL STENT PLACEMENT;  Surgeon: Doyle Palomares MD;  Location: [facility] OR    OPEN REDUCTION INTERNAL FIXATION TOE(S)  4/13/2012    Procedure:OPEN REDUCTION INTERNAL FIXATION TOE(S); Open reduction internal fixation right proximal fifth metatarsal fracture-   Anes-choice block; Surgeon:LEY, JEFFREY DUANE; Location:[place] OR    PICC SINGLE LUMEN PLACEMENT  3/20/2024    PICC TRIPLE LUMEN PLACEMENT  2/21/2022         no family history of premature coronary artery disease Social History     Socioeconomic History    Marital status: Single     Spouse name: Not on file    Number of children: Not on file    Years of education: Not on file    Highest education level: Not on file   Occupational History     Employer: [employer]   Tobacco Use    Smoking status: Never    Smokeless tobacco: Never   Vaping Use    Vaping status: Never Used   Substance and Sexual Activity    Alcohol use: Yes     Comment: social    Drug use: No    Sexual activity: Not Currently     Partners: Male     Birth control/protection: Pill   Other Topics Concern    Parent/sibling w/ CABG, MI or angioplasty before 65F 55M? No   Social History Narrative    , 3rd-5th grade     Social Drivers of Health     Financial Resource Strain: Low Risk  (9/25/2023)    Financial Resource Strain     Within the past 12 months, have you or your family  members you live with been unable to get utilities (heat, electricity) when it was really needed?: No   Food Insecurity: Low Risk  (3/21/2024)    Food Insecurity     Within the past 12 months, did you worry that your food would run out before you got money to buy more?: No     Within the past 12 months, did the food you bought just not last and you didn t have money to get more?: No   Transportation Needs: Low Risk  (3/21/2024)    Transportation Needs     Within the past 12 months, has lack of transportation kept you from medical appointments, getting your medicines, non-medical meetings or appointments, work, or from getting things that you need?: No   Physical Activity: Inactive (3/21/2024)    Exercise Vital Sign     Days of Exercise per Week: 0 days     Minutes of Exercise per Session: 0 min   Stress: No Stress Concern Present (12/4/2020)    [employer] - Occupational Stress Questionnaire     Feeling of Stress : Only a little   Social Connections: Unknown (3/1/2022)    Social Connection and Isolation Panel [NHANES]     Frequency of Communication with Friends and Family: Twice a week     Frequency of Social Gatherings with Friends and Family: Twice a week     Attends Denominational Services: Not on file     Active Member of Clubs or Organizations: Not on file     Attends Club or Organization Meetings: Not on file     Marital Status: Not on file   Interpersonal Safety: Low Risk  (9/27/2023)    Interpersonal Safety     Do you feel physically and emotionally safe where you currently live?: Yes     Within the past 12 months, have you been hit, slapped, kicked or otherwise physically hurt by someone?: No     Within the past 12 months, have you been humiliated or emotionally abused in other ways by your partner or ex-partner?: No   Housing Stability: Low Risk  (3/21/2024)    Housing Stability     Do you have housing? : Yes     Are you worried about losing your housing?: No           Lab Results     Chemistry/lipid CBC Cardiac Enzymes/BNP/TSH/INR   Lab Results   Component Value Date    CHOL 93 09/27/2023    HDL 28 (L) 09/27/2023    TRIG 116 09/27/2023    BUN 21.7 (H) 02/12/2025     02/12/2025    CO2 23 02/12/2025    Lab Results   Component Value Date    WBC 7.2 02/12/2025    HGB 14.7 02/12/2025    HCT 45.5 02/12/2025    MCV 85 02/12/2025     02/12/2025    Lab Results   Component Value Date    TROPONINI 0.03 04/06/2022     (H) 04/06/2022    TSH 1.52 04/07/2022    INR 1.35 (H) 04/22/2024     Lab Results   Component Value Date    TROPONINI 0.03 04/06/2022          Weight:    Wt Readings from Last 3 Encounters:   02/11/25 (!) 149.7 kg (330 lb)   01/13/25 140.6 kg (310 lb)   09/11/24 135.2 kg (298 lb)       Allergies  No Known Allergies      Surgical History  Past Surgical History:   Procedure Laterality Date    CHOLECYSTECTOMY, LAPOROSCOPIC      Cholecystectomy, Laparoscopic    COLONOSCOPY  2007?    Bathroom issue..results normal    COMBINED CYSTOSCOPY, RETROGRADES, EXCHANGE STENT URETER(S) Right 2/21/2022    Procedure: CYSTOSCOPY, WITH right RETROGRADE PYELOGRAM AND right URETERAL STENT PLACEMENT;  Surgeon: Doyle Palomares MD;  Location: [facility] OR    OPEN REDUCTION INTERNAL FIXATION TOE(S)  4/13/2012    Procedure:OPEN REDUCTION INTERNAL FIXATION TOE(S); Open reduction internal fixation right proximal fifth metatarsal fracture-   Anes-choice block; Surgeon:LEY, JEFFREY DUANE; Location:[place] OR    PICC SINGLE LUMEN PLACEMENT  3/20/2024    PICC TRIPLE LUMEN PLACEMENT  2/21/2022            Social History  Tobacco:   History   Smoking Status    Never   Smokeless Tobacco    Never    Alcohol:   Social History    Substance and Sexual Activity      Alcohol use: Yes        Comment: social   Illicit Drugs:   History   Drug Use No       Family History  Family History   Problem Relation Age of Onset    Neurologic Disorder Father         MS    Heart Disease Father         irregular heart rate     Diabetes Father     Melanoma Father     Sleep Apnea Father     Other Cancer Father     Cancer Maternal Grandfather         lung    Other Cancer Maternal Grandfather     Cancer Paternal Grandmother         stomach    Other Cancer Paternal Grandmother     Cancer Mother     Other Cancer Mother     Thyroid Disease Mother     Gynecology Maternal Aunt         PCOS    Hypertension Maternal Grandmother     Anxiety Disorder Maternal Grandmother     Thyroid Disease Maternal Grandmother     Anxiety Disorder Sister           Raúl Sanchez MD on 2/12/2025      cc: Mikala Luna,

## 2025-02-12 NOTE — PLAN OF CARE
Problem: Adult Inpatient Plan of Care  Goal: Absence of Hospital-Acquired Illness or Injury  Outcome: Progressing  Intervention: Identify and Manage Fall Risk  Recent Flowsheet Documentation  Taken 2/11/2025 2330 by Preet Pacheco RN  Safety Promotion/Fall Prevention:   activity supervised   room near nurse's station   room organization consistent   safety round/check completed  Intervention: Prevent Skin Injury  Recent Flowsheet Documentation  Taken 2/11/2025 2330 by Preet Pacheco, RN  Body Position: weight shifting  Intervention: Prevent and Manage VTE (Venous Thromboembolism) Risk  Recent Flowsheet Documentation  Taken 2/11/2025 2330 by Preet Pacheco RN  VTE Prevention/Management: SCDs off (sequential compression devices)  Intervention: Prevent Infection  Recent Flowsheet Documentation  Taken 2/11/2025 2330 by Preet Pacheco RN  Infection Prevention:   rest/sleep promoted   single patient room provided     Problem: UTI (Urinary Tract Infection)  Goal: Improved Infection Symptoms  Outcome: Progressing     Problem: Fluid Volume Excess  Goal: Fluid Balance  Outcome: Progressing   Goal Outcome Evaluation:  VSS on CPAP, A&Ox4, denies pain. Tele Sinus Tachy. BLE edema +4, on diuretics with periwick in place, frequent output (see flowsheets). BUE finger tips dusky/cold, BLE skin deborah and bumpy. Coccyx bruised. Trop 69, BNP elevated. Not OOB this shift. BG 95. Will continue with plan of care.

## 2025-02-12 NOTE — CONSULTS
"Care Management Initial Consult    General Information  Assessment completed with: Patient,    Type of CM/SW Visit: Initial Assessment    Primary Care Provider verified and updated as needed: Yes   Readmission within the last 30 days: no previous admission in last 30 days      Reason for Consult: discharge planning  Advance Care Planning: Advance Care Planning Reviewed: other (see comments) (has healthcare directive, requested patient bring copy in when able)          Communication Assessment  Patient's communication style: spoken language (English or Bilingual)             Cognitive  Cognitive/Neuro/Behavioral: WDL                      Living Environment:   People in home: alone     Current living Arrangements: town home      Able to return to prior arrangements: other (see comments) (to be determined)       Family/Social Support:  Care provided by: self  Provides care for: no one  Marital Status: Single  Support system: Sibling(s)          Description of Support System: Supportive    Support Assessment: Patient communicates needs well met    Current Resources:   Patient receiving home care services: No        Community Resources: DME  Equipment currently used at home: commode chair, grab bar, toilet, grab bar, tub/shower, shower chair, walker, rolling, wheelchair, manual, other (see comments) (\"grabber\", lift chair)  Supplies currently used at home: Incontinence Supplies, Oxygen Tubing/Supplies, Other (CPAP)    Employment/Financial:  Employment Status: disabled        Financial Concerns: none   Referral to Financial Worker: No       Does the patient's insurance plan have a 3 day qualifying hospital stay waiver?  No    Lifestyle & Psychosocial Needs:  Social Drivers of Health     Food Insecurity: Low Risk  (3/21/2024)    Food Insecurity     Within the past 12 months, did you worry that your food would run out before you got money to buy more?: No     Within the past 12 months, did the food you bought just not last " and you didn t have money to get more?: No   Depression: Not at risk (10/2/2024)    PHQ-2     PHQ-2 Score: 2   Housing Stability: Low Risk  (3/21/2024)    Housing Stability     Do you have housing? : Yes     Are you worried about losing your housing?: No   Tobacco Use: Low Risk  (1/13/2025)    Patient History     Smoking Tobacco Use: Never     Smokeless Tobacco Use: Never     Passive Exposure: Not on file   Financial Resource Strain: Low Risk  (9/25/2023)    Financial Resource Strain     Within the past 12 months, have you or your family members you live with been unable to get utilities (heat, electricity) when it was really needed?: No   Alcohol Use: Not on file   Transportation Needs: Low Risk  (3/21/2024)    Transportation Needs     Within the past 12 months, has lack of transportation kept you from medical appointments, getting your medicines, non-medical meetings or appointments, work, or from getting things that you need?: No   Physical Activity: Inactive (3/21/2024)    Exercise Vital Sign     Days of Exercise per Week: 0 days     Minutes of Exercise per Session: 0 min   Interpersonal Safety: Low Risk  (9/27/2023)    Interpersonal Safety     Do you feel physically and emotionally safe where you currently live?: Yes     Within the past 12 months, have you been hit, slapped, kicked or otherwise physically hurt by someone?: No     Within the past 12 months, have you been humiliated or emotionally abused in other ways by your partner or ex-partner?: No   Stress: No Stress Concern Present (12/4/2020)    [employer] - Occupational Stress Questionnaire     Feeling of Stress : Only a little   Social Connections: Unknown (3/1/2022)    Social Connection and Isolation Panel [NHANES]     Frequency of Communication with Friends and Family: Twice a week     Frequency of Social Gatherings with Friends and Family: Twice a week     Attends Sabianist Services: Not on file     Active Member of Clubs  "or Organizations: Not on file     Attends Club or Organization Meetings: Not on file     Marital Status: Not on file   Health Literacy: Not on file       Functional Status:  Prior to admission patient needed assistance:   Dependent ADLs:: Ambulation-walker, Wheelchair-independent  Dependent IADLs:: Cleaning, Laundry, Transportation       Mental Health Status:  Mental Health Status: No Current Concerns       Chemical Dependency Status:  Chemical Dependency Status: No Current Concerns             Values/Beliefs:  Spiritual, Cultural Beliefs, Spiritism Practices, Values that affect care: no               Discussed  Partnership in Safe Discharge Planning  document with patient/family: No    Additional Information:  CM consult received for discharge planning.  Met with patient at bedside to introduce CM role and perform initial assessment.  Demographics verified and updated as needed.  Bess lives alone in a single level town home with ramp entry.  Has necessary DME at home including O2 3Lpm through [facility].  Gets housekeeping services weekly and groceries delivered.  Patient reports she can walk very short distances with walker otherwise uses manual wheelchair for mobility.  Discussed possible discharge need for TCU or home care.  Patient states she will follow whatever is recommended for discharge but her preference would be home with home care if needed.  Patient reports \"I know my limitations, when I get up tomorrow I think I'll be fine.\"  Will need wheelchair transportation arranged at discharge.        Next Steps: PT/OT recommendations pending.  CM to follow for medical progression of care, discharge recommendations, and final discharge plan.      Fan Ramírez CRNI      "

## 2025-02-12 NOTE — PROGRESS NOTES
SPEECH PATHOLOGY CLINICAL SWALLOW EVALUATION       02/12/25 1100   Appointment Info   Signing Clinician's Name / Credentials (SLP) Bam Cooley MA [facility] SLP   General Information   Onset of Illness/Injury or Date of Surgery 02/11/25   Referring Physician Bianca Combs MD   Patient/Family Therapy Goal Statement (SLP) Patient denies swallow difficulty or changes to swallow.   Pertinent History of Current Problem 49 year old female with a past medical history of multiple sclerosis, hypertension, CHF presented to the ED by ambulance for evaluation post fall,  CT scan of the chest was negative for acute process, CT scan of the head was also performed which showed no acute process but showed  A few small nonspecific hypoattenuating lesions in the right cerebellar hemisphere, not definitively present on the previous exam. These raise concern for age-indeterminate infarcts, possibly chronic. Right occipital/suboccipital scalp contusion without underlying displaced calvarial fracture.  Patient is going to be admitted post fall, PT and OT evaluation, possible placement and to rule out MS flare versus other CNS pathology. MRI of the brain overnight shows no acute MS lesions, but there is a punctate diffusion signal abnormality in the white matter of the right frontal lobe.  Workup also shows significant pyuria, she has received ceftriaxone intravenously overnight, and also some IV fluids.  She is feeling significantly better today compared to yesterday.   General Observations Pt is alert, oriented.   Type of Evaluation   Type of Evaluation Swallow Evaluation   Oral Motor   Oral Musculature generally intact   Mucosal Quality dry   Dentition (Oral Motor)   Comment, Dentition (Oral Motor) one broken molar on right that she is addressing with her dentist, she chews on left side of her mouth to avoid that tooth.   Dentition (Oral Motor) adequate dentition   Facial Symmetry (Oral Motor)   Facial Symmetry (Oral Motor) WNL    Lip Function (Oral Motor)   Lip Range of Motion (Oral Motor) WNL   Tongue Function (Oral Motor)   Tongue Strength (Oral Motor) WFL   Tongue Coordination/Speed (Oral Motor) reduced rate   Tongue ROM (Oral Motor) WNL   Jaw Function (Oral Motor)   Jaw Function (Oral Motor) WNL   Cough/Swallow/Gag Reflex (Oral Motor)   Volitional Throat Clear/Cough (Oral Motor) WNL   Volitional Swallow (Oral Motor) WNL   Vocal Quality/Secretion Management (Oral Motor)   Vocal Quality (Oral Motor) WFL   Secretion Management (Oral Motor) WNL   General Swallowing Observations   Past History of Dysphagia no oral pharyngeal dysphagia   Comment, General Swallowing Observations she reports she chews slowly, carefully at baseline.   Respiratory Support nasal cannula   Current Diet/Method of Nutritional Intake (General Swallowing Observations, NIS) thin liquids (level 0);regular diet   Swallowing Evaluation Clinical swallow evaluation   Clinical Swallow Evaluation   Feeding Assistance no assistance needed   Clinical Swallow Evaluation Textures Trialed thin liquids;solid foods   Clinical Swallow Eval: Thin Liquid Texture Trial   Mode of Presentation, Thin Liquids straw;self-fed   Volume of Liquid or Food Presented 6 oz   Oral Phase of Swallow WFL   Pharyngeal Phase of Swallow intact   Diagnostic Statement WFL for single sips and rapid consecutive swallows   Clinical Swallow Evaluation: Solid Food Texture Trial   Mode of Presentation self-fed   Volume Presented sheela crackers   Oral Phase WFL   Pharyngeal Phase intact   Diagnostic Statement prolonged mastication (baseline) but functional, no overt s/s aspiration, no oral pocketing or stasis   Esophageal Phase of Swallow   Patient reports or presents with symptoms of esophageal dysphagia No   Swallowing Recommendations   Diet Consistency Recommendations thin liquids (level 0);regular diet   Comment, Swallowing Recommendations Recommend regular diet w/thin liquids, no aspiration concerns, patient  is careful about chewing, slow rate of intake.   Clinical Impression   Criteria for Skilled Therapeutic Interventions Met (SLP Eval) Evaluation only   Clinical Impression Comments Patient is alert, oriented. She has a broken right molar so she tends to chew on the left side of her mouth. She has no dysarthria, aphasia. Oral phase is slow but functional for bolus control and mastication. Swallows are observed and appear timely. She has no overt s/s aspiration with solid or liquids. She is able to take single sips or rapid consecutive swallows by straw without difficulty. She independently takes sips as needed to clear oral cavity of residuals, good lingual sweep bilaterally to control boluses, no pocketing.   SLP Total Evaluation Time   Eval: oral/pharyngeal swallow function, clinical swallow Minutes (15745) 18   SLP Discharge Planning   SLP Plan eval only   SLP Rationale for DC Rec per care team, no SLP needs identified   SLP Brief overview of current status  Recommend regular diet w/thin liquids, no aspiration concerns, patient is careful about chewing, slow rate of intake

## 2025-02-13 ENCOUNTER — APPOINTMENT (OUTPATIENT)
Dept: MRI IMAGING | Facility: HOSPITAL | Age: 50
End: 2025-02-13
Attending: PSYCHIATRY & NEUROLOGY
Payer: COMMERCIAL

## 2025-02-13 ENCOUNTER — APPOINTMENT (OUTPATIENT)
Dept: PHYSICAL THERAPY | Facility: HOSPITAL | Age: 50
End: 2025-02-13
Attending: STUDENT IN AN ORGANIZED HEALTH CARE EDUCATION/TRAINING PROGRAM
Payer: COMMERCIAL

## 2025-02-13 ENCOUNTER — APPOINTMENT (OUTPATIENT)
Dept: OCCUPATIONAL THERAPY | Facility: HOSPITAL | Age: 50
End: 2025-02-13
Attending: STUDENT IN AN ORGANIZED HEALTH CARE EDUCATION/TRAINING PROGRAM
Payer: COMMERCIAL

## 2025-02-13 VITALS
TEMPERATURE: 97.6 F | OXYGEN SATURATION: 93 % | SYSTOLIC BLOOD PRESSURE: 103 MMHG | BODY MASS INDEX: 50.02 KG/M2 | HEIGHT: 64 IN | WEIGHT: 293 LBS | RESPIRATION RATE: 16 BRPM | HEART RATE: 88 BPM | DIASTOLIC BLOOD PRESSURE: 57 MMHG

## 2025-02-13 LAB
ANION GAP SERPL CALCULATED.3IONS-SCNC: 13 MMOL/L (ref 7–15)
BACTERIA BLD CULT: NORMAL
BACTERIA BLD CULT: NORMAL
BUN SERPL-MCNC: 16.6 MG/DL (ref 6–20)
CALCIUM SERPL-MCNC: 10.1 MG/DL (ref 8.8–10.4)
CHLORIDE SERPL-SCNC: 99 MMOL/L (ref 98–107)
CREAT SERPL-MCNC: 0.71 MG/DL (ref 0.51–0.95)
EGFRCR SERPLBLD CKD-EPI 2021: >90 ML/MIN/1.73M2
ERYTHROCYTE [DISTWIDTH] IN BLOOD BY AUTOMATED COUNT: 18.2 % (ref 10–15)
GLUCOSE BLDC GLUCOMTR-MCNC: 93 MG/DL (ref 70–99)
GLUCOSE BLDC GLUCOMTR-MCNC: 93 MG/DL (ref 70–99)
GLUCOSE BLDC GLUCOMTR-MCNC: 94 MG/DL (ref 70–99)
GLUCOSE BLDC GLUCOMTR-MCNC: 98 MG/DL (ref 70–99)
GLUCOSE SERPL-MCNC: 93 MG/DL (ref 70–99)
HCO3 SERPL-SCNC: 31 MMOL/L (ref 22–29)
HCT VFR BLD AUTO: 41.6 % (ref 35–47)
HGB BLD-MCNC: 13.6 G/DL (ref 11.7–15.7)
MAGNESIUM SERPL-MCNC: 1.8 MG/DL (ref 1.7–2.3)
MCH RBC QN AUTO: 28.5 PG (ref 26.5–33)
MCHC RBC AUTO-ENTMCNC: 32.7 G/DL (ref 31.5–36.5)
MCV RBC AUTO: 87 FL (ref 78–100)
PHOSPHATE SERPL-MCNC: 2.9 MG/DL (ref 2.5–4.5)
PLATELET # BLD AUTO: 175 10E3/UL (ref 150–450)
POTASSIUM SERPL-SCNC: 2.9 MMOL/L (ref 3.4–5.3)
POTASSIUM SERPL-SCNC: 3.3 MMOL/L (ref 3.4–5.3)
POTASSIUM SERPL-SCNC: 3.4 MMOL/L (ref 3.4–5.3)
RBC # BLD AUTO: 4.78 10E6/UL (ref 3.8–5.2)
SODIUM SERPL-SCNC: 143 MMOL/L (ref 135–145)
WBC # BLD AUTO: 5.9 10E3/UL (ref 4–11)

## 2025-02-13 PROCEDURE — 999N000157 HC STATISTIC RCP TIME EA 10 MIN

## 2025-02-13 PROCEDURE — A9585 GADOBUTROL INJECTION: HCPCS | Performed by: PSYCHIATRY & NEUROLOGY

## 2025-02-13 PROCEDURE — 72157 MRI CHEST SPINE W/O & W/DYE: CPT

## 2025-02-13 PROCEDURE — 94660 CPAP INITIATION&MGMT: CPT

## 2025-02-13 PROCEDURE — 250N000011 HC RX IP 250 OP 636: Performed by: INTERNAL MEDICINE

## 2025-02-13 PROCEDURE — 80048 BASIC METABOLIC PNL TOTAL CA: CPT | Performed by: INTERNAL MEDICINE

## 2025-02-13 PROCEDURE — 84132 ASSAY OF SERUM POTASSIUM: CPT | Performed by: INTERNAL MEDICINE

## 2025-02-13 PROCEDURE — 99233 SBSQ HOSP IP/OBS HIGH 50: CPT | Performed by: INTERNAL MEDICINE

## 2025-02-13 PROCEDURE — P9047 ALBUMIN (HUMAN), 25%, 50ML: HCPCS | Performed by: INTERNAL MEDICINE

## 2025-02-13 PROCEDURE — 36415 COLL VENOUS BLD VENIPUNCTURE: CPT | Performed by: INTERNAL MEDICINE

## 2025-02-13 PROCEDURE — 72156 MRI NECK SPINE W/O & W/DYE: CPT

## 2025-02-13 PROCEDURE — 97165 OT EVAL LOW COMPLEX 30 MIN: CPT | Mod: GO

## 2025-02-13 PROCEDURE — 97530 THERAPEUTIC ACTIVITIES: CPT | Mod: GP

## 2025-02-13 PROCEDURE — 250N000011 HC RX IP 250 OP 636: Performed by: STUDENT IN AN ORGANIZED HEALTH CARE EDUCATION/TRAINING PROGRAM

## 2025-02-13 PROCEDURE — 255N000002 HC RX 255 OP 636: Performed by: PSYCHIATRY & NEUROLOGY

## 2025-02-13 PROCEDURE — 97535 SELF CARE MNGMENT TRAINING: CPT | Mod: GO

## 2025-02-13 PROCEDURE — 85041 AUTOMATED RBC COUNT: CPT | Performed by: INTERNAL MEDICINE

## 2025-02-13 PROCEDURE — 84295 ASSAY OF SERUM SODIUM: CPT | Performed by: INTERNAL MEDICINE

## 2025-02-13 PROCEDURE — 97161 PT EVAL LOW COMPLEX 20 MIN: CPT | Mod: GP

## 2025-02-13 PROCEDURE — 84100 ASSAY OF PHOSPHORUS: CPT | Performed by: INTERNAL MEDICINE

## 2025-02-13 PROCEDURE — 82310 ASSAY OF CALCIUM: CPT | Performed by: INTERNAL MEDICINE

## 2025-02-13 PROCEDURE — 250N000013 HC RX MED GY IP 250 OP 250 PS 637: Performed by: STUDENT IN AN ORGANIZED HEALTH CARE EDUCATION/TRAINING PROGRAM

## 2025-02-13 PROCEDURE — 85018 HEMOGLOBIN: CPT | Performed by: INTERNAL MEDICINE

## 2025-02-13 PROCEDURE — 250N000013 HC RX MED GY IP 250 OP 250 PS 637: Performed by: PSYCHIATRY & NEUROLOGY

## 2025-02-13 PROCEDURE — 83735 ASSAY OF MAGNESIUM: CPT | Performed by: INTERNAL MEDICINE

## 2025-02-13 PROCEDURE — 250N000013 HC RX MED GY IP 250 OP 250 PS 637: Performed by: INTERNAL MEDICINE

## 2025-02-13 PROCEDURE — 120N000001 HC R&B MED SURG/OB

## 2025-02-13 PROCEDURE — 99232 SBSQ HOSP IP/OBS MODERATE 35: CPT | Performed by: PSYCHIATRY & NEUROLOGY

## 2025-02-13 RX ORDER — GADOBUTROL 604.72 MG/ML
15 INJECTION INTRAVENOUS ONCE
Status: COMPLETED | OUTPATIENT
Start: 2025-02-13 | End: 2025-02-13

## 2025-02-13 RX ORDER — POTASSIUM CHLORIDE 1500 MG/1
20 TABLET, EXTENDED RELEASE ORAL ONCE
Status: COMPLETED | OUTPATIENT
Start: 2025-02-13 | End: 2025-02-13

## 2025-02-13 RX ORDER — POTASSIUM CHLORIDE 1500 MG/1
40 TABLET, EXTENDED RELEASE ORAL ONCE
Status: COMPLETED | OUTPATIENT
Start: 2025-02-13 | End: 2025-02-13

## 2025-02-13 RX ORDER — ALBUMIN (HUMAN) 12.5 G/50ML
50 SOLUTION INTRAVENOUS ONCE
Status: COMPLETED | OUTPATIENT
Start: 2025-02-13 | End: 2025-02-13

## 2025-02-13 RX ADMIN — HEPARIN SODIUM 5000 UNITS: 5000 INJECTION, SOLUTION INTRAVENOUS; SUBCUTANEOUS at 20:22

## 2025-02-13 RX ADMIN — ALBUMIN HUMAN 50 G: 0.25 SOLUTION INTRAVENOUS at 05:42

## 2025-02-13 RX ADMIN — MICONAZOLE NITRATE: 20 POWDER TOPICAL at 20:22

## 2025-02-13 RX ADMIN — POTASSIUM CHLORIDE 40 MEQ: 1500 TABLET, EXTENDED RELEASE ORAL at 23:27

## 2025-02-13 RX ADMIN — SPIRONOLACTONE 12.5 MG: 25 TABLET, FILM COATED ORAL at 08:03

## 2025-02-13 RX ADMIN — ESCITALOPRAM 10 MG: 5 TABLET, FILM COATED ORAL at 08:03

## 2025-02-13 RX ADMIN — BUMETANIDE 2 MG: 0.25 INJECTION INTRAMUSCULAR; INTRAVENOUS at 04:18

## 2025-02-13 RX ADMIN — MICONAZOLE NITRATE: 20 POWDER TOPICAL at 11:46

## 2025-02-13 RX ADMIN — CLOPIDOGREL BISULFATE 75 MG: 75 TABLET ORAL at 08:03

## 2025-02-13 RX ADMIN — HEPARIN SODIUM 5000 UNITS: 5000 INJECTION, SOLUTION INTRAVENOUS; SUBCUTANEOUS at 11:13

## 2025-02-13 RX ADMIN — POTASSIUM CHLORIDE 20 MEQ: 1500 TABLET, EXTENDED RELEASE ORAL at 11:13

## 2025-02-13 RX ADMIN — HEPARIN SODIUM 5000 UNITS: 5000 INJECTION, SOLUTION INTRAVENOUS; SUBCUTANEOUS at 04:18

## 2025-02-13 RX ADMIN — BUMETANIDE 2 MG: 0.25 INJECTION INTRAMUSCULAR; INTRAVENOUS at 17:30

## 2025-02-13 RX ADMIN — EMPAGLIFLOZIN 10 MG: 10 TABLET, FILM COATED ORAL at 08:03

## 2025-02-13 RX ADMIN — POTASSIUM CHLORIDE 40 MEQ: 1500 TABLET, EXTENDED RELEASE ORAL at 17:24

## 2025-02-13 RX ADMIN — ASPIRIN 81 MG: 81 TABLET, COATED ORAL at 08:03

## 2025-02-13 RX ADMIN — GADOBUTROL 15 ML: 604.72 INJECTION INTRAVENOUS at 22:29

## 2025-02-13 RX ADMIN — CEFTRIAXONE SODIUM 1 G: 1 INJECTION, POWDER, FOR SOLUTION INTRAMUSCULAR; INTRAVENOUS at 17:29

## 2025-02-13 RX ADMIN — POTASSIUM CHLORIDE 40 MEQ: 1500 TABLET, EXTENDED RELEASE ORAL at 08:56

## 2025-02-13 RX ADMIN — SIMVASTATIN 10 MG: 10 TABLET, FILM COATED ORAL at 22:53

## 2025-02-13 ASSESSMENT — ACTIVITIES OF DAILY LIVING (ADL)
ADLS_ACUITY_SCORE: 69
ADLS_ACUITY_SCORE: 74
ADLS_ACUITY_SCORE: 74
ADLS_ACUITY_SCORE: 71
ADLS_ACUITY_SCORE: 74
ADLS_ACUITY_SCORE: 69
ADLS_ACUITY_SCORE: 71
ADLS_ACUITY_SCORE: 69
ADLS_ACUITY_SCORE: 71
ADLS_ACUITY_SCORE: 69
ADLS_ACUITY_SCORE: 71
ADLS_ACUITY_SCORE: 74
ADLS_ACUITY_SCORE: 71
ADLS_ACUITY_SCORE: 71
ADLS_ACUITY_SCORE: 74
ADLS_ACUITY_SCORE: 71
ADLS_ACUITY_SCORE: 69
ADLS_ACUITY_SCORE: 74
ADLS_ACUITY_SCORE: 71

## 2025-02-13 NOTE — PROGRESS NOTES
HEART CARE NOTE          Assessment/Recommendations     1. HFpEF/RV dysfunction c/b severe ADHF  Assessment / Plan  Profoundly hypervolemic on physical exam; diuresing well on current regimen - no changes at this time; continue to monitor UOP and renal function closely; repeat IV albumin bolus given the degree of 3rd spacing  Patient is at increased risk for adverse cardiac events 2/2 non-compliance, morbid obesity, renal dysfunction     2. CKD  Assessment / Plan  Follows with [facility]  Diuresis as above; renal function wnl; continue to monitor UOP and renal function closely     3. HTN  Assessment / Plan   Adequately controlled - no additional recommendations at this time     4. NARCISA  Assessment / Plan  CPAP at bedtime     5. PE c/b Core pulmonale + pulmonary HTN  Assessment / Plan  Hx of PE; not currently on AC - defer management to primary  C/b Core pulmonale; pulm Htn likely multifactorial given hx of PE and current WHO Group 2 2/2 volume overload - repeat echo oncer near euvolemia     Plan of care discussed on February 13, 2025 with patient at bedside, and primary team overseeing patient's care      History of Present Illness/Subjective    Ms. Bess Enamorado is a 49 year old female with a PMHx significant for (per Epic notation) multiple sclerosis, hypertension, CHF presented to the ED by ambulance for evaluation post fall     Today, Mrs. Enamorado denies acute cardiac events or complaints; Management plan as detailed above     ECG: Personally reviewed. normal sinus rhythm, nonspecific ST and T waves changes, incomplete RBBB, right axis deviation.     ECHO (personnaly Reviewed on 2/13/24):   1. Technically difficult study.  2. The left ventricle is normal in size.Image quality does not provide for  detailed assessment of LV systolic function, but is felt to be normal with a  visually estimated ejection fraction of roughly 65%.  3. There is abnormal septal motion c/w right ventricular overload.  4.  "Probable mild-moderate tricuspid insufficiency (the color Doppler tricuspid  insufficiency jet is poorly visualized).  5. There is severe right ventricular enlargement with severely reduced right  ventricular systolic performance.  6. The left atrium appears enlarged though difficult to quantify due to  inadequate visualization. There is severe right atrial enlargement.  7. Right ventricular systolic pressure relative to right atrial pressure is  moderate-severely increased. The pulmonary artery pressure is estimated to be  70-75 mmHg plus right atrial pressure. In addition, the IVC appears dilated  with decreased phasic variation in caval diameter consistent with elevated  right atrial pressure.     When compared to the prior real-time echocardiogram dated 19 July 2024, the  pulmonary artery pressure estimate measures slightly less on the current  study. The findings are otherwise felt to be fairly similar on both  examinations (although the right ventricular size was previously described as  only mildly enlarged, it is this reader's impression that right ventricular  size was severely increased on the prior study with concomitant severe  depression and right ventricular systolic performance).    Telemetry: personally reviewed February 13, 2025; notable for sinus      Lab results: personally reviewed February 13, 2025; notable for hypokalemia, renal function wnl    Medical history and pertinent documents reviewed in Care Everywhere please where applicable see details above        Physical Examination Review of Systems   /63 (BP Location: Left arm)   Pulse 79   Temp 98.1  F (36.7  C) (Oral)   Resp 20   Ht 1.626 m (5' 4\")   Wt (!) 149.7 kg (330 lb)   SpO2 93%   BMI 56.64 kg/m    Body mass index is 56.64 kg/m .  Wt Readings from Last 3 Encounters:   02/11/25 (!) 149.7 kg (330 lb)   01/13/25 140.6 kg (310 lb)   09/11/24 135.2 kg (298 lb)     General Appearance:   no distress, normal body habitus "   ENT/Mouth: membranes moist, no oral lesions or bleeding gums.      EYES:  no scleral icterus, normal conjunctivae   Neck: no carotid bruits or thyromegaly   Chest/Lungs:   lungs are clear to auscultation, no rales or wheezing, equal chest wall expansion    Cardiovascular:   Regular. Normal first and second heart sounds with no murmurs, rubs, or gallops; the carotid, radial and posterior tibial pulses are intact, + JVD and LE edema bilaterally    Abdomen:  no organomegaly, masses, bruits, or tenderness; bowel sounds are present   Extremities: no cyanosis or clubbing   Skin: no xanthelasma, warm.    Neurologic: NAD     Psychiatric: alert and oriented x3, calm     A complete 10 systems ROS was reviewed  And is negative except what is listed in the HPI.          Medical History  Surgical History Family History Social History   Past Medical History:   Diagnosis Date    Acute on chronic diastolic congestive heart failure (H) 02/09/2022    Arthritis 2019    Hips    ASCUS favor benign 08/2015    Neg HPV    Depressive disorder 2010    H/O colposcopy with cervical biopsy 09/14/2015    Bx & ECC - negative    Hypertension 2019    LSIL on Pap smear 07/2014    Neg high risk HPV    Multiple sclerosis (H) 08/29/2005 9/18/200pt dx with MS 2004--dx by neurolgist and is followed by Dr. Harris    Myocardial infarction (H)     Obese     Pneumonia due to infectious organism, unspecified laterality, unspecified part of lung 02/08/2022    Sepsis (Temp 101, , WBC 13.0) 10/23/2018    Shingles 10/2016    SIRS (systemic inflammatory response syndrome) (H) 03/04/2021    Sleep apnea     UNSPEC CONSTIPATION 09/18/2006 9/18/2006   Has tried otc suppository and otc lax.     Past Surgical History:   Procedure Laterality Date    CHOLECYSTECTOMY, LAPOROSCOPIC      Cholecystectomy, Laparoscopic    COLONOSCOPY  2007?    Bathroom issue..results normal    COMBINED CYSTOSCOPY, RETROGRADES, EXCHANGE STENT URETER(S) Right 2/21/2022     Procedure: CYSTOSCOPY, WITH right RETROGRADE PYELOGRAM AND right URETERAL STENT PLACEMENT;  Surgeon: Doyle Palomares MD;  Location: [facility] [place] [facility]    OPEN REDUCTION INTERNAL FIXATION TOE(S)  4/13/2012    Procedure:OPEN REDUCTION INTERNAL FIXATION TOE(S); Open reduction internal fixation right proximal fifth metatarsal fracture-   Anes-choice block; Surgeon:LEY, JEFFREY DUANE; Location:[place] OR    PICC SINGLE LUMEN PLACEMENT  3/20/2024    PICC TRIPLE LUMEN PLACEMENT  2/21/2022         no family history of premature coronary artery disease Social History     Socioeconomic History    Marital status: Single     Spouse name: Not on file    Number of children: Not on file    Years of education: Not on file    Highest education level: Not on file   Occupational History     Employer: [employer]   Tobacco Use    Smoking status: Never    Smokeless tobacco: Never   Vaping Use    Vaping status: Never Used   Substance and Sexual Activity    Alcohol use: Yes     Comment: social    Drug use: No    Sexual activity: Not Currently     Partners: Male     Birth control/protection: Pill   Other Topics Concern    Parent/sibling w/ CABG, MI or angioplasty before 65F 55M? No   Social History Narrative    , 3rd-5th grade     Social Drivers of Health     Financial Resource Strain: Low Risk  (2/12/2025)    Financial Resource Strain     Within the past 12 months, have you or your family members you live with been unable to get utilities (heat, electricity) when it was really needed?: No   Food Insecurity: Low Risk  (2/12/2025)    Food Insecurity     Within the past 12 months, did you worry that your food would run out before you got money to buy more?: No     Within the past 12 months, did the food you bought just not last and you didn t have money to get more?: No   Transportation Needs: Low Risk  (2/12/2025)    Transportation Needs     Within the past 12 months, has lack of transportation kept you from medical  appointments, getting your medicines, non-medical meetings or appointments, work, or from getting things that you need?: No   Physical Activity: Inactive (3/21/2024)    Exercise Vital Sign     Days of Exercise per Week: 0 days     Minutes of Exercise per Session: 0 min   Stress: No Stress Concern Present (12/4/2020)    [place] [employer] - Occupational Stress Questionnaire     Feeling of Stress : Only a little   Social Connections: Unknown (3/1/2022)    Social Connection and Isolation Panel [NHANES]     Frequency of Communication with Friends and Family: Twice a week     Frequency of Social Gatherings with Friends and Family: Twice a week     Attends Faith Services: Not on file     Active Member of Clubs or Organizations: Not on file     Attends Club or Organization Meetings: Not on file     Marital Status: Not on file   Interpersonal Safety: Low Risk  (2/12/2025)    Interpersonal Safety     Do you feel physically and emotionally safe where you currently live?: Yes     Within the past 12 months, have you been hit, slapped, kicked or otherwise physically hurt by someone?: No     Within the past 12 months, have you been humiliated or emotionally abused in other ways by your partner or ex-partner?: No   Housing Stability: Low Risk  (2/12/2025)    Housing Stability     Do you have housing? : Yes     Are you worried about losing your housing?: No           Lab Results    Chemistry/lipid CBC Cardiac Enzymes/BNP/TSH/INR   Lab Results   Component Value Date    CHOL 81 02/12/2025    HDL 11 (L) 02/12/2025    TRIG 134 02/12/2025    BUN 21.7 (H) 02/12/2025     02/12/2025    CO2 23 02/12/2025    Lab Results   Component Value Date    WBC 7.2 02/12/2025    HGB 14.7 02/12/2025    HCT 45.5 02/12/2025    MCV 85 02/12/2025     02/12/2025    Lab Results   Component Value Date    TROPONINI 0.03 04/06/2022     (H) 04/06/2022    TSH 3.52 02/12/2025    INR 1.35 (H) 04/22/2024     Lab Results    Component Value Date    TROPONINI 0.03 04/06/2022          Weight:    Wt Readings from Last 3 Encounters:   02/11/25 (!) 149.7 kg (330 lb)   01/13/25 140.6 kg (310 lb)   09/11/24 135.2 kg (298 lb)       Allergies  No Known Allergies      Surgical History  Past Surgical History:   Procedure Laterality Date    CHOLECYSTECTOMY, LAPOROSCOPIC      Cholecystectomy, Laparoscopic    COLONOSCOPY  2007?    Bathroom issue..results normal    COMBINED CYSTOSCOPY, RETROGRADES, EXCHANGE STENT URETER(S) Right 2/21/2022    Procedure: CYSTOSCOPY, WITH right RETROGRADE PYELOGRAM AND right URETERAL STENT PLACEMENT;  Surgeon: Doyle Palomares MD;  [place] [facility] [place]    OPEN REDUCTION INTERNAL FIXATION TOE(S)  4/13/2012    Procedure:OPEN REDUCTION INTERNAL FIXATION TOE(S); Open reduction internal fixation right proximal fifth metatarsal fracture-   Anes-choice block; Surgeon:LEY, JEFFREY DUANE; Location:[place] OR    PICC SINGLE LUMEN PLACEMENT  3/20/2024    PICC TRIPLE LUMEN PLACEMENT  2/21/2022            Social History  Tobacco:   History   Smoking Status    Never   Smokeless Tobacco    Never    Alcohol:   Social History    Substance and Sexual Activity      Alcohol use: Yes        Comment: social   Illicit Drugs:   History   Drug Use No       Family History  Family History   Problem Relation Age of Onset    Neurologic Disorder Father         MS    Heart Disease Father         irregular heart rate    Diabetes Father     Melanoma Father     Sleep Apnea Father     Other Cancer Father     Cancer Maternal Grandfather         lung    Other Cancer Maternal Grandfather     Cancer Paternal Grandmother         stomach    Other Cancer Paternal Grandmother     Cancer Mother     Other Cancer Mother     Thyroid Disease Mother     Gynecology Maternal Aunt         PCOS    Hypertension Maternal Grandmother     Anxiety Disorder Maternal Grandmother     Thyroid Disease Maternal Grandmother     Anxiety Disorder Sister           Raúl Sanchez,  MD on 2/13/2025      cc: Mikala Luna

## 2025-02-13 NOTE — PROGRESS NOTES
"   02/13/25 1032   Appointment Info   Signing Clinician's Name / Credentials (PT) SHEBA Kelley   Living Environment   People in Home alone   Current Living Arrangements house  (Town house)   Home Accessibility wheelchair accessible  (Ramp to enter)   Transportation Anticipated agency   Living Environment Comments Pt utilizes MWC, accessible home. Requires short distance ambulation with FWW in home. No family/friend support currently, reports  1xweek.   Self-Care   Usual Activity Tolerance moderate   Current Activity Tolerance fair   Equipment Currently Used at Home commode chair;grab bar, toilet;grab bar, tub/shower;shower chair;walker, rolling;wheelchair, manual;other (see comments)   Fall history within last six months yes   Number of times patient has fallen within last six months 1   General Information   Onset of Illness/Injury or Date of Surgery 02/11/25   Referring Physician Bianca Combs MD   Patient/Family Therapy Goals Statement (PT) Return to home, increase activity   Pertinent History of Current Problem (include personal factors and/or comorbidities that impact the POC) \"49 year old female with a past medical history of multiple sclerosis, hypertension, diastolic CHF presented to the ED by ambulance for evaluation post fall.\" per chart   Existing Precautions/Restrictions fall   Weight-Bearing Status - LLE full weight-bearing   Weight-Bearing Status - RLE full weight-bearing   Integumentary/Edema   Integumentary/Edema Comments Severe lymphedema, xerosis and nodular growths (LLE>RLE)  (Significant LE pain to touch on sores)   Range of Motion (ROM)   Range of Motion ROM deficits secondary to weakness;ROM deficits secondary to pain   Strength (Manual Muscle Testing)   Strength (Manual Muscle Testing) Deficits observed during functional mobility   Bed Mobility   Bed Mobility supine-sit;sit-supine   Supine-Sit [place] (Bed Mobility) moderate assist (50% patient effort) "   Sit-Supine [place] (Bed Mobility) 1 person assist   Bed Mobility Limitations decreased ability to use legs for bridging/pushing   Impairments Contributing to Impaired Bed Mobility pain;decreased strength   Assistive Device (Bed Mobility) bed rails;draw sheet   Transfers   Transfers sit-stand transfer   Impairments Contributing to Impaired Transfers pain;decreased strength   Sit-Stand Transfer   Sit-Stand [place] (Transfers) moderate assist (50% patient effort);1 person assist   Assistive Device (Sit-Stand Transfers) walker, front-wheeled  (Bariatric)   Gait/Stairs (Locomotion)   [place] Level (Gait) moderate assist (50% patient effort)   Assistive Device (Gait) walker, front-wheeled  (Bariatric)   Distance in Feet (Gait) 6 side steps towards HOB   Deviations/Abnormal Patterns (Gait) base of support, wide;festinating/shuffling;gait speed decreased;stride length decreased  (Decreased L LE strength requiring compensation to move L LE, inability to step L foot off of ground)   Clinical Impression   Criteria for Skilled Therapeutic Intervention Yes, treatment indicated   PT Diagnosis (PT) Impaired functional mobility   Influenced by the following impairments LE pain, impaired strength (L LE>R LE), decrease activity tolerance   Functional limitations due to impairments Bed mobility, transfers, gait   Clinical Presentation (PT Evaluation Complexity) stable   Clinical Presentation Rationale Pt presents as medically diagnosed.   Clinical Decision Making (Complexity) low complexity   Planned Therapy Interventions (PT) balance training;gait training;bed mobility training;home exercise program;patient/family education;strengthening;transfer training;progressive activity/exercise;home program guidelines   Risk & Benefits of therapy have been explained evaluation/treatment results reviewed;care plan/treatment goals reviewed;patient   PT Total Evaluation Time   PT [place] Low Complexity Minutes (58485) 12   Physical  Therapy Goals   PT Frequency 6x/week   PT Predicted Duration/Target Date for Goal Attainment 02/20/25   PT Goals Bed Mobility;Transfers;Gait   PT: Bed Mobility Supine to/from sit;Minimal assist   PT: Transfers Assistive device;Sit to/from stand;Minimal assist   PT: Gait Assistive device;Rolling walker;10 feet;Minimal assist   Interventions   Interventions Quick Adds Therapeutic Activity   Therapeutic Activity   Therapeutic Activities: dynamic activities to improve functional performance Minutes (68451) 10   Symptoms Noted During/After Treatment Fatigue   Treatment Detail/Skilled Intervention Supine to sit transfer with modAx1 for L LE off of bed, minimal L LE strength demonstrated. Patient utilized bed rail with bilateral UE for assist. Prolonged sitting at EOB with SBA due to decreased bilateral LE strength. Sit <> stand transfer from elevated bed height with modAx1 using FWW, assist due to impaired bilatateral LE strength. Prolonged standing at FWW for 2 min with CGA. Side step to HOB with FWW modA, movements of L LE with compensatory trunk strategies. Stand to sit to bed with modA, bed mobility to return to bed modA for L LE assist. Positioning in bed with sheets using assistx2.   PT Discharge Planning   PT Plan Bed mobility, LE strengthening in sitting (L LE>R LE), standing pre gait with FWW, short distance ambulation with FWW as safe   PT Discharge Recommendation (DC Rec) Transitional Care Facility   PT Rationale for DC Rec Pt requiring assistx1 due to decreased strength leading to impaired functional mobility. No patient external support in home, requiring additional therapy to be independent to d/c to home.   PT Brief overview of current status ModA for bed mobility, transfers and short distance ambulation   PT Total Distance Amb During Session (feet) 3   PT Equipment Needed at Discharge wheelchair cushion;wheelchair;walker, rolling;gait belt   Physical Therapy Time and Intention   Timed Code Treatment  Minutes 10   Total Session Time (sum of timed and untimed services) 22

## 2025-02-13 NOTE — PROGRESS NOTES
02/13/25 1315   Appointment Info   Signing Clinician's Name / Credentials (OT) Jesusita Lambert OTR/L   Quick Adds   Quick Adds Edema   Living Environment   People in Home alone   Current Living Arrangements house   Home Accessibility wheelchair accessible   Transportation Anticipated health plan transportation   Living Environment Comments pt only walks short distances otherwise is in manual w/c.  Stays on 1 level in home   Self-Care   Usual Activity Tolerance moderate   Current Activity Tolerance fair   Regular Exercise No   Equipment Currently Used at Home commode chair;grab bar, toilet;grab bar, tub/shower;shower chair;walker, rolling   Fall history within last six months yes   Number of times patient has fallen within last six months 1   Instrumental Activities of Daily Living (IADL)   IADL Comments pt gets assistance 1x week with cleaning, otherwise has been managing household on her own   General Information   Onset of Illness/Injury or Date of Surgery 02/11/25   Referring Physician Dr Combs   Patient/Family Therapy Goal Statement (OT) none stated   Existing Precautions/Restrictions fall;oxygen therapy device and L/min   Cognitive Status Examination   Orientation Status orientation to person, place and time   Affect/Mental Status (Cognitive) WNL   Pain Assessment   Patient Currently in Pain No   Edema General Information   Onset of Edema   (pt with long h/o LE edema, unknown start)   Affected Body Part(s) Left LE;Right LE   Edema Etiology Unknown   Edema Examination/Assessment   Skin Condition Non-pitting;Other (see comments)   Skin Condition Comments LE's with hyperkeratosis throughout as well as large papillomas on B LE's, L worse than R   Scar No   Skin Integrity pt does not appear to have any opens areas   Pitting Assessment non pitting   Fibrosis Assessment increased fibrotic skin, hyperkeratosis   Range of Motion Comprehensive   General Range of Motion no range of motion deficits identified    Strength Comprehensive (MMT)   General Manual Muscle Testing (MMT) Assessment no strength deficits identified   Bed Mobility   Bed Mobility supine-sit;sit-supine   Supine-Sit [place] (Bed Mobility) moderate assist (50% patient effort)   Sit-Supine [place] (Bed Mobility) maximum assist (25% patient effort)   Comment (Bed Mobility) needing increased assist with LE management   Transfers   Transfer Comments NT   Activities of Daily Living   BADL Assessment/Intervention lower body dressing   Lower Body Dressing Assessment/Training   Position (Lower Body Dressing) edge of bed sitting   Comment, (Lower Body Dressing) per clinical reasoning, pt likely to struggle with toileting due to limited mobility and difficulty with standing   [place] Level (Lower Body Dressing) maximum assist (25% patient effort)   Clinical Impression   Criteria for Skilled Therapeutic Interventions Met (OT) Yes, treatment indicated   OT Diagnosis decreased ADL independence   Edema: Patient Presentation Edema   Influenced by the following impairments weakness, fatigue   OT Problem List-Impairments impacting ADL problems related to;activity tolerance impaired;mobility;strength   Assessment of Occupational Performance 3-5 Performance Deficits   Identified Performance Deficits trsfs, mobility, toileting,dressing   Planned Therapy Interventions (OT) ADL retraining;bed mobility training;transfer training   Edema: Planned Interventions Education   Clinical Decision Making Complexity (OT) detailed assessment/moderate complexity   Risk & Benefits of therapy have been explained evaluation/treatment results reviewed;patient   OT Total Evaluation Time   OT Eval, Low Complexity Minutes (56957) 20   OT Goals   Therapy Frequency (OT) 5 times/week   OT Predicted Duration/Target Date for Goal Attainment 02/20/25   OT: Lower Body Dressing Moderate assist;using adaptive equipment;from wheelchair   OT: Transfer Minimal assist;with assistive device    OT: Toilet Transfer/Toileting Minimal assist;toilet transfer;cleaning and garment management   OT: Edema education to increase ability to manage edema after discharge from the hospital Patient;Verbalize   OT: Functional edema exercise program to reduce limb volume, increase activity tolerance and improve independence with ADL Patient;Demonstrates   Self-Care/Home Management   Self-Care/Home Mgmt/ADL, Compensatory, Meal Prep Minutes (67612) 30   Treatment Detail/Skilled Intervention Eval completed, treatment initiated.  OT:  pt agreeable to therapy.  Completed supine to sit with mod assist, needing cues for tech, along with use of rails and draw sheet to assist.  Min assist to get safely scooted to EOB but with cues able to manuever more effectively to EOB.  Pt sat EOB with SBA, no LOB noted.  Sit to stand from bed with min assist, cues for safe hand placement and weight shifting tech. Able to side step to HOB with min assist and FWW.  Edema: LE's assessed.  Pt would greatly benefit from wraps however with pt having difficulty with continued follow up so wrapping now would not benefit pt in the long run.  Provided education to patient on an effective treatment plan, will likely initiate elasticized compression next date.  Pt left in supine with call light and alarm activated.   OT Discharge Planning   OT Plan Edema:  wash, lotion, initiate elasticized compression, OT: trsfs, toileting, g/h standing if able, try leg    OT Discharge Recommendation (DC Rec) Transitional Care Facility   OT Rationale for DC Rec Pt lives alone and is not currently at baseline function for performance of ADL's. TCU recommended   OT Brief overview of current status min to mod assist bed mob, min assist standing   Total Session Time   Timed Code Treatment Minutes 30   Total Session Time (sum of timed and untimed services) 50

## 2025-02-13 NOTE — PLAN OF CARE
"  Problem: Adult Inpatient Plan of Care  Goal: Absence of Hospital-Acquired Illness or Injury  Intervention: Identify and Manage Fall Risk  Recent Flowsheet Documentation  Taken 2/13/2025 0050 by Patrick Lizarraga RN  Safety Promotion/Fall Prevention:   activity supervised   assistive device/personal items within reach   clutter free environment maintained   increased rounding and observation   increase visualization of patient   lighting adjusted   room near nurse's station   room organization consistent   safety round/check completed   supervised activity   toileting scheduled  Taken 2/12/2025 2230 by Patrick Lizarraga RN  Safety Promotion/Fall Prevention:   activity supervised   assistive device/personal items within reach   clutter free environment maintained   increased rounding and observation   increase visualization of patient   lighting adjusted   room near nurse's station   room organization consistent   safety round/check completed   supervised activity   toileting scheduled     Problem: Adult Inpatient Plan of Care  Goal: Absence of Hospital-Acquired Illness or Injury  Intervention: Prevent and Manage VTE (Venous Thromboembolism) Risk  Recent Flowsheet Documentation  Taken 2/13/2025 0050 by Patrick Lizarraga RN  VTE Prevention/Management: (Pt on SubQ heparin) SCDs off (sequential compression devices)  Taken 2/12/2025 2230 by Patrick Lizarraga RN  VTE Prevention/Management: (Pt on SubQ heparin) SCDs off (sequential compression devices)     Problem: Adult Inpatient Plan of Care  Goal: Patient-Specific Goal (Individualized)  Description: You can add care plan individualizations to a care plan. Examples of Individualization might be:  \"Parent requests to be called daily at 9am for status\", \"I have a hard time hearing out of my right ear\", or \"Do not touch me to wake me up as it startles  me\".  Outcome: Progressing   Goal Outcome Evaluation:    Safety was maintained with hourly rounding, standard fall " precautions, and bed alarm use. Pt remained compliant about calling for assistance.  Pt slept approx. 60% of night shift with minimal interruptions.  Discharge planning in progress.

## 2025-02-13 NOTE — PLAN OF CARE
Goal Outcome Evaluation:               Pt came up to the unit and was settled in . Pt denied pain except with movements and repositioning. Pt's BG was 100. Tele NSR. Pt had the pure wick which leaked and we had to do total bed change. Call light within reach, and Pt calls appropriately.

## 2025-02-13 NOTE — PROGRESS NOTES
[facility]    Medicine Progress Note - Hospitalist Service    Date of Admission:  2/11/2025    Assessment & Plan   49 year old female with a past medical history of multiple sclerosis, hypertension, diastolic CHF presented to the ED by ambulance for evaluation post fall    1.Status post fall  -Generalized weakness most likely multifactorial and possible subacute to acute cva  -at high risk falls with MS  -lives alone  -severe edema legs  -mri see below of brain  -now today neuro wants to check mri spine to see if any MS new lesions there     2.Possible acute to subacute CVA  -per mri here   -1.  There is a punctate focus of acute or early subacute infarct within the posterior paramedian right frontal white matter.  2.  There are several small chronic infarcts within the cerebellar hemispheres. A single linear infarct in the inferolateral right cerebellum may be more recent.  3.  T2 hyperintensities within the white matter probably reflect a combination of chronic demyelinating plaques given the provided history of multiple sclerosis, and chronic small vessel ischemic changes. No abnormal enhancement to indicate active   demyelination in the brain.  -neurology consulted and not clear if this is true cva vs artifact in MS pt  -neuro recs--adding plavix x 3 weeks and I have added statin  -continue asa    3.MS with ongoing leg weakness, complex with edema  -per neuro they will consider more mri of spine  -she is not on any med for MS, dx >20 years ago  -neuro checking Mri of spine now for MS new lesions to explain left leg weakness     4.Severe edema legs with suspected severe right sided heart failure exacerbation  -volume up severe  -echo  -iv bumex  -appreciate cards seeing  -tele  -doppler legs    5.Pulm HTN  -suggested on scans  -ct neg for pe  -diureses iv bumex per cards     6.Chronic hypoxic resp failure  -on O2 at home,at 3 liters  -she notes she follows with chan here,   -chan  did see and just want her to follow up in clinic     7.Possible UTI  -ceftriaxone  >100,000 CFU/mL Escherichia coli Abnormal         Resulting Agency: [facility]     Susceptibility       Escherichia coli     PHILIP     Ampicillin >=32 ug/mL Resistant     Ampicillin/ Sulbactam 16 ug/mL Intermediate     Cefazolin 4 ug/mL Susceptible     Cefepime <=0.12 ug/mL Susceptible     Ceftazidime <=0.5 ug/mL Susceptible     Ceftriaxone <=0.25 ug/mL Susceptible     Ciprofloxacin 0.5 ug/mL Intermediate     Gentamicin <=1 ug/mL Susceptible     Levofloxacin 1 ug/mL Intermediate     Nitrofurantoin <=16 ug/mL Susceptible     Piperacillin/Tazobactam <=4 ug/mL Susceptible     Trimethoprim/Sulfamethoxazole <=1/19 ug/mL Susceptible             8.Lactic acidosis   -improved    9.Elevated trop  -echo  -tele  -no cp  -getting cards to see    10.Hyperbilirubinemia  -checked abd ultrasound     11.Right sided heart failure exacerbation  -iv bumex  -cards  -tele  - Bumex, Aldactone, Jardiance    12.Morbid obesity  -just started Wegovy PTA     13.Mood d/o  - Lexapro, will continue    14.Code-full    Pt and Ot eval , ?if we will need tcu    --I called sister to update 1600          Diet: Combination Diet Low Saturated Fat Na <2400mg Diet, No Caffeine Diet  Fluid restriction 1800 ML FLUID    DVT Prophylaxis: Heparin SQ  Brar Catheter: Not present  Lines: None     Cardiac Monitoring: ACTIVE order. Indication: Stroke, acute (48 hours)  Code Status: Full Code      Clinically Significant Risk Factors        # Hypokalemia: Lowest K = 2.9 mmol/L in last 2 days, will replace as needed   # Hypochloremia: Lowest Cl = 97 mmol/L in last 2 days, will monitor as appropriate   # Hypercalcemia: Highest Ca = 10.7 mg/dL in last 2 days, will monitor as appropriate    # Hypoalbuminemia: Lowest albumin = 3.2 g/dL at 2/11/2025  1:48 PM, will monitor as appropriate     # Hypertension: Noted on problem list  # Acute heart failure with preserved ejection fraction: heart failure  "noted on problem list, last echo with EF >50%, and receiving IV diuretics           # Severe Obesity: Estimated body mass index is 56.64 kg/m  as calculated from the following:    Height as of this encounter: 1.626 m (5' 4\").    Weight as of this encounter: 149.7 kg (330 lb)., PRESENT ON ADMISSION     # Financial/Environmental Concerns: none         Social Drivers of Health    Physical Activity: Inactive (3/21/2024)    Exercise Vital Sign     Days of Exercise per Week: 0 days     Minutes of Exercise per Session: 0 min   Social Connections: Unknown (3/1/2022)    Social Connection and Isolation Panel [NHANES]     Frequency of Communication with Friends and Family: Twice a week     Frequency of Social Gatherings with Friends and Family: Twice a week          Disposition Plan     Medically Ready for Discharge: Anticipated in 2-4 Days             Bianca Combs MD  Hospitalist Service  [facility]  Securely message with [employer] (more info)  Text page via [employer]   ______________________________________________________________________    Interval History   She feels ok  Still edema legs, but less tight  Stressed about being here  No stool  No new numbness     Physical Exam   Vital Signs: Temp: 97.5  F (36.4  C) Temp src: Oral BP: 110/67 Pulse: 89   Resp: 19 SpO2: 93 % O2 Device: None (Room air) Oxygen Delivery: 4 LPM  Weight: 330 lbs 0 oz    Constitutional: awake, fatigued, alert, cooperative, and no apparent distress  Eyes: sclera clear  Respiratory: no increased work of breathing, good air exchange, no retractions, and diminished breath sounds left and right  anterior  Cardiovascular: regular rate and rhythm and normal S1 and S2  GI: normal bowel sounds, soft, non-distended, and non-tender  Skin: erythema on feet , venous stasis changes   Musculoskeletal: 3+edema legs, but less tight, many nodules on legs   Neurologic: Mental Status Exam:  Level of Alertness:   awake. Motor " moves ext --still weaker on left leg overall   Fund of Knowledge:  normal  Language:  normal  Neuropsychiatric: Affect: anxious    Medical Decision Making       50 MINUTES SPENT BY ME on the date of service doing chart review, history, exam, documentation & further activities per the note.      Data     I have personally reviewed the following data over the past 24 hrs:    5.9  \   13.6   / 175     143 99 16.6 /  93   2.9 (L) 31 (H) 0.71 \       Imaging results reviewed over the past 24 hrs:   Recent Results (from the past 24 hours)   US Lower Extremity Venous Duplex Bilateral    Narrative    EXAM: US LOWER EXTREMITY VENOUS DUPLEX BILATERAL  LOCATION: [facility]  DATE: 2/12/2025    INDICATION: severe edema, make sure no DVT's  COMPARISON: None.  TECHNIQUE: Venous Duplex ultrasound of bilateral lower extremities with and without compression, augmentation and duplex. Color flow and spectral Doppler with waveform analysis performed.    FINDINGS: Exam includes the common femoral, femoral, popliteal veins as well as segmentally visualized deep calf veins and greater saphenous vein. Exam limited by body habitus and skin thickening    RIGHT: No deep vein thrombosis. No superficial thrombophlebitis. No popliteal cyst.    LEFT: No deep vein thrombosis. No superficial thrombophlebitis. No popliteal cyst.      Impression    IMPRESSION:  1.  No deep venous thrombosis in the bilateral lower extremities.   US Carotid Bilateral    Narrative    EXAM: US CAROTID BILATERAL  LOCATION: [facility]  DATE: 2/12/2025    INDICATION: CVA, carotid bruit.  COMPARISON: None.  TECHNIQUE: Duplex exam performed utilizing 2D gray-scale imaging, Doppler interrogation with color-flow and spectral waveform analysis. The percent diameter stenosis is determined using Updated Recommendations for Carotid Stenosis Interpretation Criteria   from [employer].    FINDINGS:    RIGHT: Mild plaque at the  bifurcation. The peak systolic velocity in the ICA is less than 180 cm/sec, consistent with less than 50% stenosis. Normal velocities in the ECA. Antegrade flow within the vertebral artery.     LEFT: Mild plaque at the bifurcation. The peak systolic velocity in the ICA is less than 180 cm/sec, consistent with less than 50% stenosis. Normal velocities in the ECA. Antegrade flow within the vertebral artery.    VELOCITY CHART:  CCA   Right: 104/16 cm/s   Left: 106/13 cm/s  ICA   Right: 51/16 cm/s   Left: 69/21 cm/s  ECA   Right: 83/14 cm/s   Left: 79/8 cm/s  ICA/CCA PSV Ratio   Right: 0.95   Left: 0.7      Impression    IMPRESSION:  1.  Mild plaque formation, velocities consistent with less than 50% stenosis in the right internal carotid artery.  2.  Mild plaque formation, velocities consistent with less than 50% stenosis in the left internal carotid artery.  3.  Flow within the vertebral arteries is antegrade.

## 2025-02-13 NOTE — PLAN OF CARE
Goal Outcome Evaluation:  Patient A&O x 4 and able to make needs known. Call light within reach and able to use appropriately. LS clear in all lobes. Patient currently on 4 L O2, denies any worsening of SOB. On 3L at baseline . NSR on tele.  NIH of 0; patient unable to lift left leg per baseline. Numbness to lower extremities per baseline. 1800 ml FR and had 1220 ml this shift. Voiding adequately with 1150 ml of clear yellow urine. Slight redness noted underneath breast; Physician updated and Micatin powder ordered and administered.   -MRI spine pending    Problem: Adult Inpatient Plan of Care  Goal: Absence of Hospital-Acquired Illness or Injury  Intervention: Identify and Manage Fall Risk  Recent Flowsheet Documentation  Taken 2/13/2025 1207 by Joni Clarke, RN  Safety Promotion/Fall Prevention:   activity supervised   mobility aid in reach   nonskid shoes/slippers when out of bed  Taken 2/13/2025 0803 by Joni Clarke, RN  Safety Promotion/Fall Prevention:   activity supervised   mobility aid in reach   nonskid shoes/slippers when out of bed  Patient in bed for most part of this shift. Turned and repo as patient would allow. Able to shift weight in bed. Participated in therapies to build strength and endurance.     Problem: Skin Injury Risk Increased  Goal: Skin Health and Integrity  Outcome: Progressing  Intervention: Plan: Nurse Driven Intervention: Moisture Management  Recent Flowsheet Documentation  Taken 2/13/2025 1207 by Joni Clarke, RN  Moisture Interventions:   No brief in bed   Incontinence pad   Urinary collection device  Taken 2/13/2025 0803 by Joni Clarke, RN  Moisture Interventions:   No brief in bed   Incontinence pad   Urinary collection device  Intervention: Plan: Nurse Driven Intervention: Friction and Shear  Recent Flowsheet Documentation  Taken 2/13/2025 1207 by Joni Clarke, RN  Friction/Shear Interventions: HOB 30 degrees or less  Taken 2/13/2025 0803 by Joni Clarke,  RN  Friction/Shear Interventions: HOB 30 degrees or less  Intervention: Optimize Skin Protection  Recent Flowsheet Documentation  Taken 2/13/2025 1207 by Joni Clarke, RN  Activity Management:   activity adjusted per tolerance   activity encouraged  Head of Bed (HOB) Positioning: HOB at 30-45 degrees  Taken 2/13/2025 0803 by Joni Clarke, RN  Activity Management:   activity adjusted per tolerance   activity encouraged  Head of Bed (HOB) Positioning: HOB at 30-45 degrees  Non blanchable redness/purplish looking skin to left lateral foot noted. Physician updated. Extremity elevated on a pillow. Heel protector requested from store room.

## 2025-02-13 NOTE — PROGRESS NOTES
[facility] Neurology  [place]    Bess Enamorado MRN# [id number]   Age: 49 year old YOB: 1975               Assessment and Plan:      History of MS  Punctate diffusion abnormality in right frontal white matter (incidental stroke versus artifact)  Generalized weakness, likely due to urinary tract infection     With the diffusion abnormality, we will complete the stroke workup  Echocardiogram shows no obvious embolic source  Carotid dopplers show only mild stenosis bilaterally, no indication for CEA  LDL43, continue same dose of simvastatin    dual antiplatelet therapy with aspirin plus Plavix for 3 weeks, after that she can resume the daily aspirin 81 mg    MRI of the brain showed no active MS, will check MRIs of c-spine and t-spine     She has chronic weakness in the left leg likely due to both MS deficits and severe edema    UTI may be the major contributor to her recent weakness. On abx appropriately    Physical therapy as able while here in the hospital. May need to consider TCU at discharge if she is not sufficiently independent            Chief Complaint/HPI:     2/13/25: pt feels strength in legs is somewhat improved compared to admission. No new issues overnight    2/12/25:  This patient is a 49-year-old woman seen for neurologic consultation today.  She was admitted to the hospital last evening, she had fallen at home on Monday, trying to take a shower.  She was unable to get up and was found by the cleaning person yesterday.  She had not brought her phone with her which she typically does.  She has been feeling ill with some low-grade fevers and has been taking Tylenol in recent days.  She does have a history of multiple sclerosis affecting mainly her left side.  She does not feel there has been any new focal weakness, though she does feel weak in general.  She does note that typically she would not be able to get up from the floor after a fall.  Due to left-sided weakness and  significant edema in the lower extremities.    Regarding her multiple sclerosis, she has seen Dr. Harris in the past and more recently saw Dr. Upton but he had moved to a different practice.  In the past she had been on Rebif, Copaxone and Tecfidera.  Most recently she was on semiannual ocrelizumab infusions but she has not had that in a few years now.  She did have an appointment with an MS specialist at the  today but unfortunately that appointment had to be canceled due to her admission.    MRI of the brain overnight shows no acute MS lesions, but there is a punctate diffusion signal abnormality in the white matter of the right frontal lobe.  Workup also shows significant pyuria, she has received ceftriaxone intravenously overnight, and also some IV fluids.  She is feeling significantly better today compared to yesterday.               Allergies:   No Known Allergies          Medications:     Current Facility-Administered Medications:     acetaminophen (TYLENOL) tablet 650 mg, 650 mg, Oral, Q4H PRN, 650 mg at 02/12/25 0824 **OR** acetaminophen (TYLENOL) Suppository 650 mg, 650 mg, Rectal, Q4H PRN, Gondal, Saad J, MD    aspirin EC tablet 81 mg, 81 mg, Oral, Daily, Gondal, Saad J, MD, 81 mg at 02/13/25 0803    bumetanide (BUMEX) injection 2 mg, 2 mg, Intravenous, Q12H, Raúl Sanchez MD, 2 mg at 02/13/25 0418    [Held by provider] bumetanide (BUMEX) tablet 6 mg, 6 mg, Oral, Daily, Gondal, Saad J, MD, 6 mg at 02/12/25 0842    calcium carbonate (TUMS) chewable tablet 1,000 mg, 1,000 mg, Oral, 4x Daily PRN, Gondal, Saad J, MD    cefTRIAXone (ROCEPHIN) 1 g vial to attach to  mL bag for ADULTS or NS 50 mL bag for PEDS, 1 g, Intravenous, Q24H, Gondal, Saad J, MD, 1 g at 02/12/25 1825    clopidogrel (PLAVIX) tablet 75 mg, 75 mg, Oral, Daily, Eric Blevins MD, 75 mg at 02/13/25 0803    empagliflozin (JARDIANCE) tablet 10 mg, 10 mg, Oral, Daily, Gondal, Saad J, MD, 10 mg at 02/13/25 0803    escitalopram  (LEXAPRO) tablet 10 mg, 10 mg, Oral, Daily, Gondal, Saad J, MD, 10 mg at 02/13/25 0803    heparin ANTICOAGULANT injection 5,000 Units, 5,000 Units, Subcutaneous, Q8H, Bianca Combs MD, 5,000 Units at 02/13/25 0418    HYDROmorphone (DILAUDID) injection 0.2 mg, 0.2 mg, Intravenous, Q2H PRN, Gondal, Saad J, MD    HYDROmorphone (DILAUDID) injection 0.4 mg, 0.4 mg, Intravenous, Q2H PRN, Gondal, Saad J, MD    lidocaine (LMX4) cream, , Topical, Q1H PRN, Bianca Combs MD    lidocaine (LMX4) cream, , Topical, Q1H PRN, Gondal, Saad J, MD    lidocaine 1 % 0.1-1 mL, 0.1-1 mL, Other, Q1H PRN, Bianca Combs MD    lidocaine 1 % 0.1-1 mL, 0.1-1 mL, Other, Q1H PRN, Gondal, Saad J, MD    naloxone (NARCAN) injection 0.2 mg, 0.2 mg, Intravenous, Q2 Min PRN **OR** naloxone (NARCAN) injection 0.4 mg, 0.4 mg, Intravenous, Q2 Min PRN **OR** naloxone (NARCAN) injection 0.2 mg, 0.2 mg, Intramuscular, Q2 Min PRN **OR** naloxone (NARCAN) injection 0.4 mg, 0.4 mg, Intramuscular, Q2 Min PRN, Gondal, Saad J, MD    ondansetron (ZOFRAN ODT) ODT tab 4 mg, 4 mg, Oral, Q6H PRN **OR** ondansetron (ZOFRAN) injection 4 mg, 4 mg, Intravenous, Q6H PRN, Gondal, Saad J, MD    oxyCODONE (ROXICODONE) tablet 5 mg, 5 mg, Oral, Q4H PRN, Gondal, Saad J, MD    oxyCODONE IR (ROXICODONE) half-tab 2.5 mg, 2.5 mg, Oral, Q4H PRN, Gondal, Saad J, MD    potassium chloride sheila ER (KLOR-CON M20) CR tablet 20 mEq, 20 mEq, Oral, Once, Bianca Combs MD    senna-docusate (SENOKOT-S/PERICOLACE) 8.6-50 MG per tablet 1 tablet, 1 tablet, Oral, BID PRN **OR** senna-docusate (SENOKOT-S/PERICOLACE) 8.6-50 MG per tablet 2 tablet, 2 tablet, Oral, BID PRN, Gondal, Saad J, MD    simvastatin (ZOCOR) tablet 10 mg, 10 mg, Oral, At Bedtime, Bianca Combs MD, 10 mg at 02/12/25 2233    sodium chloride (PF) 0.9% PF flush 3 mL, 3 mL, Intracatheter, q1 min prn, Bianca Combs MD    sodium chloride (PF) 0.9% PF flush 3 mL, 3 mL, Intracatheter, Q8H, Gondal, Saad  "MD DILIA, 3 mL at 02/13/25 0418    sodium chloride (PF) 0.9% PF flush 3 mL, 3 mL, Intracatheter, q1 min prn, Gondal, Saad J, MD    spironolactone (ALDACTONE) half-tab 12.5 mg, 12.5 mg, Oral, Daily, Gondal, Saad J, MD, 12.5 mg at 02/13/25 0803              Physical Exam:      Vitals: /63 (BP Location: Left arm)   Pulse 94   Temp 97.5  F (36.4  C) (Oral)   Resp 20   Ht 1.626 m (5' 4\")   Wt (!) 149.7 kg (330 lb)   SpO2 93%   BMI 56.64 kg/m    BMI= Body mass index is 56.64 kg/m .     Patient is alert and in no acute distress, supine in the emergency room. Neck was supple, no carotid bruits, thyromegaly, lymphadenopathy or JVD noted.   Neurological Exam:   Mental status: Patient is alert and oriented x 3. Speech is clear and fluent    CN II: PERRLA.   CN III, IV, VI: EOMI. no nystagmus   CN VII: Face is symmetric    CN VII: Hearing is normal to conversation   CN IX, X: Palate elevates symmetrically. Phonation is normal.    CN XI: Head turning and shoulder shrug are intact   Motor: Hip flexor weakness bilaterally, left worse than right  Plantarflexion markedly weak on the left compared to the right  With dorsiflexion testing there is giveaway weakness bilaterally when tested simultaneously.   Reflexes: Reflexes are trace in the arms, absent in the legs   Sensory: Sensation diminished at the feet bilaterally    Coordination: Rapid alternating movements and fine finger movements are intact. There is no dysmetria on finger-to-nose testing.    Gait: Unable to walk independently      Eric Blevins MD       More than 35 minutes spent reviewing records and results, evaluating patient, discussing with pt and on documentation    "

## 2025-02-13 NOTE — PROGRESS NOTES
Care Management Follow Up    Length of Stay (days): 2    Expected Discharge Date: 02/17/2025     Concerns to be Addressed: Care progression - discharge planning     Patient plan of care discussed at interdisciplinary rounds: Yes    Anticipated Discharge Disposition:  TBD     Anticipated Discharge Services:  TBD  Anticipated Discharge DME:  NA    Patient/family educated on Medicare website which has current facility and service quality ratings:  NA  Education Provided on the Discharge Plan:  Yes per team  Patient/Family in Agreement with the Plan:  NA    Referrals Placed by CM/SW:  NA  Private pay costs discussed: Not applicable    Discussed  Partnership in Safe Discharge Planning  document with patient/family: No     Handoff Completed: No, handoff not indicated or clinically appropriate    Additional Information:  Per provider, Dr. Combs, patient is not ready. Cardiology following. On diuresis. May be here for a couple of days.    Social Hx:  Assessment: Follow. Live alone in town home.  Use wheelchair or can walk short distances w/walker. Housekeeping weekly, home O2 3Lpm through [facility]  Last note: 02/13/25  Plan: PT rec TCU, list given  Needs: Medical readiness, TCU choices  Hand off sent: Yes  Transport: Wheelchair. Transportation costs discussed? No    Next Steps: RNCM to follow for medical progression, recommendations, and final discharge plan.     Amina Hernandez RN     Met with patient at bedside to discuss PT discharge rec for Transitional care.  Patient agreed and will look through the TCU list.    Writer provided a list of local skilled nursing facilities - [facility] (which includes the medicare.gov website) for patient and family to review.

## 2025-02-14 ENCOUNTER — APPOINTMENT (OUTPATIENT)
Dept: OCCUPATIONAL THERAPY | Facility: HOSPITAL | Age: 50
End: 2025-02-14
Payer: COMMERCIAL

## 2025-02-14 ENCOUNTER — APPOINTMENT (OUTPATIENT)
Dept: PHYSICAL THERAPY | Facility: HOSPITAL | Age: 50
End: 2025-02-14
Payer: COMMERCIAL

## 2025-02-14 LAB
ANION GAP SERPL CALCULATED.3IONS-SCNC: 10 MMOL/L (ref 7–15)
BUN SERPL-MCNC: 15.1 MG/DL (ref 6–20)
CALCIUM SERPL-MCNC: 10.3 MG/DL (ref 8.8–10.4)
CHLORIDE SERPL-SCNC: 101 MMOL/L (ref 98–107)
CREAT SERPL-MCNC: 0.71 MG/DL (ref 0.51–0.95)
EGFRCR SERPLBLD CKD-EPI 2021: >90 ML/MIN/1.73M2
ERYTHROCYTE [DISTWIDTH] IN BLOOD BY AUTOMATED COUNT: 18.2 % (ref 10–15)
GLUCOSE BLDC GLUCOMTR-MCNC: 120 MG/DL (ref 70–99)
GLUCOSE BLDC GLUCOMTR-MCNC: 91 MG/DL (ref 70–99)
GLUCOSE BLDC GLUCOMTR-MCNC: 98 MG/DL (ref 70–99)
GLUCOSE SERPL-MCNC: 91 MG/DL (ref 70–99)
HCO3 SERPL-SCNC: 31 MMOL/L (ref 22–29)
HCT VFR BLD AUTO: 42.6 % (ref 35–47)
HGB BLD-MCNC: 13.3 G/DL (ref 11.7–15.7)
MCH RBC QN AUTO: 27.6 PG (ref 26.5–33)
MCHC RBC AUTO-ENTMCNC: 31.2 G/DL (ref 31.5–36.5)
MCV RBC AUTO: 88 FL (ref 78–100)
PLATELET # BLD AUTO: 190 10E3/UL (ref 150–450)
POTASSIUM SERPL-SCNC: 3.8 MMOL/L (ref 3.4–5.3)
POTASSIUM SERPL-SCNC: 3.8 MMOL/L (ref 3.4–5.3)
RBC # BLD AUTO: 4.82 10E6/UL (ref 3.8–5.2)
SODIUM SERPL-SCNC: 142 MMOL/L (ref 135–145)
WBC # BLD AUTO: 6 10E3/UL (ref 4–11)

## 2025-02-14 PROCEDURE — 99233 SBSQ HOSP IP/OBS HIGH 50: CPT | Performed by: INTERNAL MEDICINE

## 2025-02-14 PROCEDURE — 97530 THERAPEUTIC ACTIVITIES: CPT | Mod: GP

## 2025-02-14 PROCEDURE — 82565 ASSAY OF CREATININE: CPT | Performed by: INTERNAL MEDICINE

## 2025-02-14 PROCEDURE — 999N000157 HC STATISTIC RCP TIME EA 10 MIN

## 2025-02-14 PROCEDURE — 85018 HEMOGLOBIN: CPT | Performed by: INTERNAL MEDICINE

## 2025-02-14 PROCEDURE — 250N000013 HC RX MED GY IP 250 OP 250 PS 637: Performed by: INTERNAL MEDICINE

## 2025-02-14 PROCEDURE — 80048 BASIC METABOLIC PNL TOTAL CA: CPT | Performed by: INTERNAL MEDICINE

## 2025-02-14 PROCEDURE — 36415 COLL VENOUS BLD VENIPUNCTURE: CPT | Performed by: INTERNAL MEDICINE

## 2025-02-14 PROCEDURE — 250N000011 HC RX IP 250 OP 636: Performed by: STUDENT IN AN ORGANIZED HEALTH CARE EDUCATION/TRAINING PROGRAM

## 2025-02-14 PROCEDURE — 250N000011 HC RX IP 250 OP 636: Performed by: INTERNAL MEDICINE

## 2025-02-14 PROCEDURE — 82310 ASSAY OF CALCIUM: CPT | Performed by: INTERNAL MEDICINE

## 2025-02-14 PROCEDURE — 250N000013 HC RX MED GY IP 250 OP 250 PS 637: Performed by: STUDENT IN AN ORGANIZED HEALTH CARE EDUCATION/TRAINING PROGRAM

## 2025-02-14 PROCEDURE — 250N000011 HC RX IP 250 OP 636: Mod: JZ | Performed by: INTERNAL MEDICINE

## 2025-02-14 PROCEDURE — 250N000013 HC RX MED GY IP 250 OP 250 PS 637: Performed by: PSYCHIATRY & NEUROLOGY

## 2025-02-14 PROCEDURE — 85041 AUTOMATED RBC COUNT: CPT | Performed by: INTERNAL MEDICINE

## 2025-02-14 PROCEDURE — 99231 SBSQ HOSP IP/OBS SF/LOW 25: CPT | Performed by: PSYCHIATRY & NEUROLOGY

## 2025-02-14 PROCEDURE — 120N000001 HC R&B MED SURG/OB

## 2025-02-14 PROCEDURE — 97535 SELF CARE MNGMENT TRAINING: CPT | Mod: GO

## 2025-02-14 PROCEDURE — 99232 SBSQ HOSP IP/OBS MODERATE 35: CPT | Performed by: INTERNAL MEDICINE

## 2025-02-14 RX ORDER — BISACODYL 5 MG
5 TABLET, DELAYED RELEASE (ENTERIC COATED) ORAL DAILY PRN
Status: DISCONTINUED | OUTPATIENT
Start: 2025-02-14 | End: 2025-02-18 | Stop reason: HOSPADM

## 2025-02-14 RX ORDER — BUMETANIDE 2 MG/1
6 TABLET ORAL DAILY
Status: DISCONTINUED | OUTPATIENT
Start: 2025-02-14 | End: 2025-02-18 | Stop reason: HOSPADM

## 2025-02-14 RX ADMIN — HEPARIN SODIUM 5000 UNITS: 5000 INJECTION, SOLUTION INTRAVENOUS; SUBCUTANEOUS at 19:36

## 2025-02-14 RX ADMIN — BUMETANIDE 2 MG: 0.25 INJECTION INTRAMUSCULAR; INTRAVENOUS at 04:26

## 2025-02-14 RX ADMIN — MICONAZOLE NITRATE: 20 POWDER TOPICAL at 21:10

## 2025-02-14 RX ADMIN — ESCITALOPRAM 10 MG: 5 TABLET, FILM COATED ORAL at 08:21

## 2025-02-14 RX ADMIN — SPIRONOLACTONE 12.5 MG: 25 TABLET, FILM COATED ORAL at 08:21

## 2025-02-14 RX ADMIN — CLOPIDOGREL BISULFATE 75 MG: 75 TABLET ORAL at 08:22

## 2025-02-14 RX ADMIN — SIMVASTATIN 10 MG: 10 TABLET, FILM COATED ORAL at 21:09

## 2025-02-14 RX ADMIN — EMPAGLIFLOZIN 10 MG: 10 TABLET, FILM COATED ORAL at 08:22

## 2025-02-14 RX ADMIN — HEPARIN SODIUM 5000 UNITS: 5000 INJECTION, SOLUTION INTRAVENOUS; SUBCUTANEOUS at 11:19

## 2025-02-14 RX ADMIN — MICONAZOLE NITRATE: 20 POWDER TOPICAL at 08:24

## 2025-02-14 RX ADMIN — BUMETANIDE 6 MG: 2 TABLET ORAL at 08:22

## 2025-02-14 RX ADMIN — CEFTRIAXONE SODIUM 1 G: 1 INJECTION, POWDER, FOR SOLUTION INTRAMUSCULAR; INTRAVENOUS at 18:31

## 2025-02-14 RX ADMIN — HEPARIN SODIUM 5000 UNITS: 5000 INJECTION, SOLUTION INTRAVENOUS; SUBCUTANEOUS at 02:57

## 2025-02-14 RX ADMIN — ASPIRIN 81 MG: 81 TABLET, COATED ORAL at 08:22

## 2025-02-14 ASSESSMENT — ACTIVITIES OF DAILY LIVING (ADL)
ADLS_ACUITY_SCORE: 61
ADLS_ACUITY_SCORE: 53
ADLS_ACUITY_SCORE: 58
ADLS_ACUITY_SCORE: 53
ADLS_ACUITY_SCORE: 53
ADLS_ACUITY_SCORE: 58
ADLS_ACUITY_SCORE: 63
ADLS_ACUITY_SCORE: 63
ADLS_ACUITY_SCORE: 61
ADLS_ACUITY_SCORE: 63
ADLS_ACUITY_SCORE: 61
ADLS_ACUITY_SCORE: 58
ADLS_ACUITY_SCORE: 53
ADLS_ACUITY_SCORE: 58
ADLS_ACUITY_SCORE: 61
ADLS_ACUITY_SCORE: 58
ADLS_ACUITY_SCORE: 57
ADLS_ACUITY_SCORE: 58
ADLS_ACUITY_SCORE: 61
ADLS_ACUITY_SCORE: 58
ADLS_ACUITY_SCORE: 58
ADLS_ACUITY_SCORE: 57
ADLS_ACUITY_SCORE: 61

## 2025-02-14 NOTE — PROGRESS NOTES
[facility]    Medicine Progress Note - Hospitalist Service    Date of Admission:  2/11/2025    Assessment & Plan   49 year old female with a past medical history of multiple sclerosis, hypertension, diastolic CHF presented to the ED by ambulance for evaluation post fall    1.Status post fall  -Generalized weakness most likely multifactorial and possible subacute to acute cva  -at high risk falls with MS  -lives alone  -severe edema legs  -mri see below of brain  -now today neuro wants to check mri spine to see if any MS new lesions there     2.Possible acute to subacute CVA  -per mri here   -1.  There is a punctate focus of acute or early subacute infarct within the posterior paramedian right frontal white matter.  2.  There are several small chronic infarcts within the cerebellar hemispheres. A single linear infarct in the inferolateral right cerebellum may be more recent.  3.  T2 hyperintensities within the white matter probably reflect a combination of chronic demyelinating plaques given the provided history of multiple sclerosis, and chronic small vessel ischemic changes. No abnormal enhancement to indicate active   demyelination in the brain.  -neurology consulted and not clear if this is true cva vs artifact in MS pt  -neuro recs--adding plavix x 3 weeks and I have added statin  -continue asa    3.MS with ongoing leg weakness, complex with edema  -per neuro they will consider more mri of spine--no new active lesions MS now, expected lesions seen  -she is not on any med for MS, dx >20 years ago    4.Severe edema legs with suspected severe right sided heart failure exacerbation  -volume up severe  -echo  . Technically difficult study.  2. The left ventricle is normal in size.Image quality does not provide for  detailed assessment of LV systolic function, but is felt to be normal with a  visually estimated ejection fraction of roughly 65%.  3. There is abnormal septal motion c/w right  ventricular overload.  4. Probable mild-moderate tricuspid insufficiency (the color Doppler tricuspid  insufficiency jet is poorly visualized).  5. There is severe right ventricular enlargement with severely reduced right  ventricular systolic performance.  6. The left atrium appears enlarged though difficult to quantify due to  inadequate visualization. There is severe right atrial enlargement.  7. Right ventricular systolic pressure relative to right atrial pressure is  moderate-severely increased. The pulmonary artery pressure is estimated to be  70-75 mmHg plus right atrial pressure. In addition, the IVC appears dilated  with decreased phasic variation in caval diameter consistent with elevated  right atrial pressure.  -iv bumex--now po  -appreciate cards seeing  -tele  -doppler legs-neg    5.Pulm HTN  -suggested on scans  -ct neg for pe  -diureses iv bumex per cards --now po    6.Chronic hypoxic resp failure  -on O2 at home,at 3 liters, this am on 4 liters still  -she notes she follows with chan here,   -chan did see and just want her to follow up in clinic     7. UTI   -ceftriaxone  >100,000 CFU/mL Escherichia coli Abnormal         Resulting Agency: [facility]     Susceptibility       Escherichia coli     PHILIP     Ampicillin >=32 ug/mL Resistant     Ampicillin/ Sulbactam 16 ug/mL Intermediate     Cefazolin 4 ug/mL Susceptible     Cefepime <=0.12 ug/mL Susceptible     Ceftazidime <=0.5 ug/mL Susceptible     Ceftriaxone <=0.25 ug/mL Susceptible     Ciprofloxacin 0.5 ug/mL Intermediate     Gentamicin <=1 ug/mL Susceptible     Levofloxacin 1 ug/mL Intermediate     Nitrofurantoin <=16 ug/mL Susceptible     Piperacillin/Tazobactam <=4 ug/mL Susceptible     Trimethoprim/Sulfamethoxazole <=1/19 ug/mL Susceptible             8.Lactic acidosis   -improved    9.Elevated trop  -echo--see above  -tele  -no cp    10.Hyperbilirubinemia  -checked abd ultrasound     11.Right sided heart failure exacerbation  -iv bumex--now  "po  -cards  -tele  - Bumex, Aldactone, Jardiance    12.Morbid obesity  -just started Wegovy PTA     13.Mood d/o  - Lexapro, will continue    14.Code-full    Needs tcu              Diet: Combination Diet Low Saturated Fat Na <2400mg Diet, No Caffeine Diet  Fluid restriction 1800 ML FLUID    DVT Prophylaxis: Heparin SQ  Brar Catheter: Not present  Lines: None     Cardiac Monitoring: ACTIVE order. Indication: Stroke, acute (48 hours)  Code Status: Full Code      Clinically Significant Risk Factors        # Hypokalemia: Lowest K = 2.9 mmol/L in last 2 days, will replace as needed        # Hypoalbuminemia: Lowest albumin = 3.2 g/dL at 2/11/2025  1:48 PM, will monitor as appropriate     # Hypertension: Noted on problem list  # Acute heart failure with preserved ejection fraction: heart failure noted on problem list, last echo with EF >50%, and receiving IV diuretics           # Severe Obesity: Estimated body mass index is 56.64 kg/m  as calculated from the following:    Height as of this encounter: 1.626 m (5' 4\").    Weight as of this encounter: 149.7 kg (330 lb)., PRESENT ON ADMISSION     # Financial/Environmental Concerns: none         Social Drivers of Health    Physical Activity: Inactive (3/21/2024)    Exercise Vital Sign     Days of Exercise per Week: 0 days     Minutes of Exercise per Session: 0 min   Social Connections: Unknown (3/1/2022)    Social Connection and Isolation Panel [NHANES]     Frequency of Communication with Friends and Family: Twice a week     Frequency of Social Gatherings with Friends and Family: Twice a week          Disposition Plan     Medically Ready for Discharge: Anticipated in 2-4 Days             Bianca Combs MD  Hospitalist Service  [facility]  Securely message with [employer] (more info)  Text page via [employer] Paging/Directory   ______________________________________________________________________    Interval History   She feels ok   On 4 liters  Less " tightness in legs  No pain in abd or chest  Still fatigue and weakness getting up  ,esther with left leg    Physical Exam   Vital Signs: Temp: 97.9  F (36.6  C) Temp src: Axillary BP: 105/58 Pulse: 91   Resp: 22 SpO2: (!) 91 % O2 Device: BiPAP/CPAP Oxygen Delivery: 4 LPM  Weight: 330 lbs 0 oz    Constitutional: awake, fatigued, alert, cooperative, and no apparent distress  Eyes: sclera clear  Respiratory: no increased work of breathing, moderate air exchange, no retractions, and diminished breath sounds right base and left base  Cardiovascular: regular rate and rhythm and normal S1 and S2  GI: normal bowel sounds, soft, non-distended, and non-tender  Skin: venous stasis legs , nodules diffuse on legs   Musculoskeletal: edema legs, less tight  Neurologic: Mental Status Exam:  Level of Alertness:   awake  Neuropsychiatric: General: normal, calm, and normal eye contact    Medical Decision Making       35 MINUTES SPENT BY ME on the date of service doing chart review, history, exam, documentation & further activities per the note.      Data     I have personally reviewed the following data over the past 24 hrs:    6.0  \   13.3   / 190     142 101 15.1 /  120 (H)   3.8; 3.8 31 (H) 0.71 \       Imaging results reviewed over the past 24 hrs:   Recent Results (from the past 24 hours)   MR Cervical Spine w/o & w Contrast    Narrative    EXAM: MR CERVICAL SPINE W/O and W CONTRAST  LOCATION: [facility]  DATE: 2/13/2025    INDICATION: leg weakness, h o MS, eval for active MS lesion  COMPARISON: None.  CONTRAST: 15 mL Gadavist  TECHNIQUE: MRI Cervical Spine without and with IV contrast.    FINDINGS:   Motion artifact limits aspects of exam.    Cervical spinal cord demonstrates patchy multifocal small foci of increased T2/STIR signal and/or artifact. Findings are nonspecific although compatible with demyelination although limited in evaluation. No cord enhancement to suggest active    demyelination.    Bilateral paraspinal muscles demonstrate patchy increased STIR signal and enhancement may reflect inflammatory process or denervation.    Normal vertebral body heights.  No concerning bone marrow lesions.   No significant subluxations.    Mild multilevel degenerative disc disease worse at C5-6. C5-6 disc height loss with mild degenerative type I and type II Modic changes. Mild multilevel uncovertebral and facet arthropathy.    Craniocervical junction: No significant thecal sac stenosis.    C2-C3: No significant bulge or posterior disc protrusion. Mild uncovertebral facet arthropathy. No significant thecal sac stenosis. No significant neural foraminal stenosis.    C3-C4: No significant bulge or posterior disc protrusion. Uncovertebral facet arthropathy. No significant thecal sac stenosis. Mild to moderate bilateral foraminal stenosis.    C4-C5: Mild bulge. Small superimposed posterior disc extrusion extending mildly above and below the disc margin. Uncovertebral facet arthropathy. Mild thecal sac stenosis. Mild to moderate left foraminal stenosis. Severe right foraminal stenosis.    C5-C6: Bulge. Superimposed posterior disc extrusion extending mildly above and below the disc margin. Uncovertebral facet arthropathy. Mild to moderate thecal sac stenosis. Severe left foraminal stenosis. Mild-to-moderate right foraminal stenosis.    C6-C7: No significant bulge or posterior disc protrusion. Uncovertebral facet arthropathy. No significant thecal sac stenosis. No significant neural foraminal stenosis.    C7-T1: No significant bulge or posterior disc protrusion. Facet arthropathy. No significant thecal sac stenosis. No significant neural foraminal stenosis.    EXTRASPINAL FINDINGS:  Partially visualized bilateral cerebellum demonstrates several small chronic infarcts. Left inferior neck level 4 lymphadenopathy.      Impression     IMPRESSION:  1.  Cervical spinal cord demonstrates patchy multifocal small  foci of increased T2/STIR signal and/or artifact. Findings are nonspecific although compatible with demyelination although limited in evaluation.     2.  No cord enhancement to suggest active demyelination.    3.  Bilateral paraspinal muscles demonstrate patchy increased STIR signal and enhancement may reflect inflammatory process or denervation.    4.  Multilevel spondylosis described above.    5.  No critical thecal sac stenosis.    6.  C4-C5: Severe right foraminal stenosis.    7.  C5-C6: Severe left foraminal stenosis.     8.  Left inferior neck level 4 lymphadenopathy.               MR Thoracic Spine w/o & w Contrast    Narrative    EXAM: MR THORACIC SPINE W/O and W CONTRAST  LOCATION: [facility]  DATE: 2/13/2025    INDICATION: leg weakness, h o MS, eval for active MS lesion  COMPARISON: None.  CONTRAST: 15 mL Gadavist  TECHNIQUE: Routine Thoracic Spine MRI without and with IV contrast.    FINDINGS:   Thoracic spinal cord demonstrates patchy multifocal small foci of increased T2/STIR signal and/or artifact. Findings are nonspecific although compatible with demyelination although limited in evaluation. No cord enhancement to suggest active   demyelination.    Normal vertebral body heights.  No concerning bone marrow lesions.   No significant subluxations.    Mild multilevel degenerative disc disease. T11-T12 mild degenerative type I and type II Modic changes. Multilevel facet arthropathy. Mild posterior epidural lipomatosis. Lower thoracic spine demonstrates several mild bulges.     No significant thecal sac stenosis.    Various levels and degrees of neural foraminal stenosis. No severe high-grade neural foraminal stenosis.    OTHER:  Left inferior neck level 4 lymphadenopathy.        Impression     IMPRESSION:  1.  Thoracic spinal cord demonstrates patchy multifocal small foci of increased T2/STIR signal and/or artifact. Findings are nonspecific although compatible with demyelination  although limited in evaluation.     2.  No cord enhancement to suggest active demyelination.    3.  Multilevel spondylosis described above.    4.  No significant thecal sac stenosis.    5.  Left inferior neck level 4 lymphadenopathy.

## 2025-02-14 NOTE — PROGRESS NOTES
[facility] Neurology  [place]    Bess Enamorado MRN# [id number]   Age: 49 year old YOB: 1975               Assessment and Plan:      History of MS  Punctate diffusion abnormality in right frontal white matter (incidental stroke versus artifact)  Generalized weakness, likely due to urinary tract infection     With the diffusion abnormality, we will complete the stroke workup  Echocardiogram shows no obvious embolic source  Carotid dopplers show only mild stenosis bilaterally, no indication for CEA  LDL43, no need to add statin (goal LDL is less than 70)    dual antiplatelet therapy with aspirin plus Plavix for 3 weeks, after that she can resume the daily aspirin 81 mg    MRIs of the brain c-spine and t-spine showed no active MS    She has chronic weakness in the left leg likely due to both MS deficits and severe edema    UTI may be the major contributor to her recent weakness. On abx appropriately and she has had some improvement here in the hospital    Physical therapy as able while here in the hospital. May need to consider TCU at discharge if she is not sufficiently independent    No new recommendations from me today, I'll sign off.             Chief Complaint/HPI:     2/14/25: No new issues overnight, she had the MRIs of c-spine and t-spine last night, chronic MS changes noted, but no evidence of acute MS fare up    2/13/25: pt feels strength in legs is somewhat improved compared to admission. No new issues overnight    2/12/25:  This patient is a 49-year-old woman seen for neurologic consultation today.  She was admitted to the hospital last evening, she had fallen at home on Monday, trying to take a shower.  She was unable to get up and was found by the cleaning person yesterday.  She had not brought her phone with her which she typically does.  She has been feeling ill with some low-grade fevers and has been taking Tylenol in recent days.  She does have a history of multiple sclerosis  affecting mainly her left side.  She does not feel there has been any new focal weakness, though she does feel weak in general.  She does note that typically she would not be able to get up from the floor after a fall.  Due to left-sided weakness and significant edema in the lower extremities.    Regarding her multiple sclerosis, she has seen Dr. Harris in the past and more recently saw Dr. Upton but he had moved to a different practice.  In the past she had been on Rebif, Copaxone and Tecfidera.  Most recently she was on semiannual ocrelizumab infusions but she has not had that in a few years now.  She did have an appointment with an MS specialist at the  today but unfortunately that appointment had to be canceled due to her admission.    MRI of the brain overnight shows no acute MS lesions, but there is a punctate diffusion signal abnormality in the white matter of the right frontal lobe.  Workup also shows significant pyuria, she has received ceftriaxone intravenously overnight, and also some IV fluids.  She is feeling significantly better today compared to yesterday.               Allergies:   No Known Allergies          Medications:     Current Facility-Administered Medications:     acetaminophen (TYLENOL) tablet 650 mg, 650 mg, Oral, Q4H PRN, 650 mg at 02/12/25 0824 **OR** acetaminophen (TYLENOL) Suppository 650 mg, 650 mg, Rectal, Q4H PRN, Gondal, Saad J, MD    aspirin EC tablet 81 mg, 81 mg, Oral, Daily, Gondal, Saad J, MD, 81 mg at 02/14/25 0822    bumetanide (BUMEX) tablet 6 mg, 6 mg, Oral, Daily, Raúl Sanchez MD, 6 mg at 02/14/25 0822    calcium carbonate (TUMS) chewable tablet 1,000 mg, 1,000 mg, Oral, 4x Daily PRN, Gondal, Saad J, MD    cefTRIAXone (ROCEPHIN) 1 g vial to attach to  mL bag for ADULTS or NS 50 mL bag for PEDS, 1 g, Intravenous, Q24H, Gondal, Saad J, MD, 1 g at 02/13/25 1729    clopidogrel (PLAVIX) tablet 75 mg, 75 mg, Oral, Daily, Eric Blevins MD, 75 mg at 02/14/25  0822    empagliflozin (JARDIANCE) tablet 10 mg, 10 mg, Oral, Daily, Gondal, Saad J, MD, 10 mg at 02/14/25 0822    escitalopram (LEXAPRO) tablet 10 mg, 10 mg, Oral, Daily, Gondal, Saad J, MD, 10 mg at 02/14/25 0821    heparin ANTICOAGULANT injection 5,000 Units, 5,000 Units, Subcutaneous, Q8H, Bianca Combs MD, 5,000 Units at 02/14/25 0257    HYDROmorphone (DILAUDID) injection 0.2 mg, 0.2 mg, Intravenous, Q2H PRN, Gondal, Saad J, MD    HYDROmorphone (DILAUDID) injection 0.4 mg, 0.4 mg, Intravenous, Q2H PRN, Gondal, Saad J, MD    influenza trivalent vaccine for ages 50-64 years (PF) (FLUBLOK) injection 0.5 mL, 0.5 mL, Intramuscular, Prior to discharge, Bianca Combs MD    lidocaine (LMX4) cream, , Topical, Q1H PRN, Bianca Combs MD    lidocaine (LMX4) cream, , Topical, Q1H PRN, Gondal, Saad J, MD    lidocaine 1 % 0.1-1 mL, 0.1-1 mL, Other, Q1H PRN, Bianca Combs MD    lidocaine 1 % 0.1-1 mL, 0.1-1 mL, Other, Q1H PRN, Gondal, Saad J, MD    miconazole (MICATIN) 2 % powder, , Topical, BID, Bianca Combs MD, Given at 02/14/25 0824    naloxone (NARCAN) injection 0.2 mg, 0.2 mg, Intravenous, Q2 Min PRN **OR** naloxone (NARCAN) injection 0.4 mg, 0.4 mg, Intravenous, Q2 Min PRN **OR** naloxone (NARCAN) injection 0.2 mg, 0.2 mg, Intramuscular, Q2 Min PRN **OR** naloxone (NARCAN) injection 0.4 mg, 0.4 mg, Intramuscular, Q2 Min PRN, Gondal, Saad J, MD    ondansetron (ZOFRAN ODT) ODT tab 4 mg, 4 mg, Oral, Q6H PRN **OR** ondansetron (ZOFRAN) injection 4 mg, 4 mg, Intravenous, Q6H PRN, Gondal, Saad J, MD    oxyCODONE (ROXICODONE) tablet 5 mg, 5 mg, Oral, Q4H PRN, Gondal, Saad J, MD    oxyCODONE IR (ROXICODONE) half-tab 2.5 mg, 2.5 mg, Oral, Q4H PRN, Gondal, Saad J, MD    senna-docusate (SENOKOT-S/PERICOLACE) 8.6-50 MG per tablet 1 tablet, 1 tablet, Oral, BID PRN **OR** senna-docusate (SENOKOT-S/PERICOLACE) 8.6-50 MG per tablet 2 tablet, 2 tablet, Oral, BID PRN, Gondal, Saad J, MD    simvastatin (ZOCOR)  "tablet 10 mg, 10 mg, Oral, At Bedtime, Bianca Combs MD, 10 mg at 02/13/25 2253    sodium chloride (PF) 0.9% PF flush 3 mL, 3 mL, Intracatheter, q1 min prn, Bianca Combs MD    sodium chloride (PF) 0.9% PF flush 3 mL, 3 mL, Intracatheter, Q8H, Gondal, Saad J, MD, 3 mL at 02/14/25 0153    sodium chloride (PF) 0.9% PF flush 3 mL, 3 mL, Intracatheter, q1 min prn, Gondal, Saad J, MD    spironolactone (ALDACTONE) half-tab 12.5 mg, 12.5 mg, Oral, Daily, Gondal, Saad J, MD, 12.5 mg at 02/14/25 0821              Physical Exam:      Vitals: /58 (BP Location: Left arm)   Pulse 91   Temp 97.9  F (36.6  C) (Axillary)   Resp 22   Ht 1.626 m (5' 4\")   Wt (!) 149.7 kg (330 lb)   SpO2 (!) 91%   BMI 56.64 kg/m    BMI= Body mass index is 56.64 kg/m .     Patient is alert and in no acute distress, supine in the emergency room. Neck was supple, no carotid bruits, thyromegaly, lymphadenopathy or JVD noted.   Neurological Exam:   Mental status: Patient is alert and oriented x 3. Speech is clear and fluent    CN II: PERRLA.   CN III, IV, VI: EOMI. no nystagmus   CN VII: Face is symmetric    CN VII: Hearing is normal to conversation   CN IX, X: Palate elevates symmetrically. Phonation is normal.    CN XI: Head turning and shoulder shrug are intact   Motor: Hip flexor weakness bilaterally, left worse than right  Plantarflexion markedly weak on the left compared to the right  With dorsiflexion testing there is giveaway weakness bilaterally when tested simultaneously.   Coordination: Rapid alternating movements and fine finger movements are intact. There is no dysmetria on finger-to-nose testing.    Gait: Unable to walk independently      Eric Blevins MD         "

## 2025-02-14 NOTE — PROGRESS NOTES
[facility]  Patient is established with the [facility] at our [place] office- she is followed by SHANNAN Anguiano, SHANNAN Parks, and Dr. Diaz.     Noted patient's admission. Arranged sooner CORE ANNAMARIE Follow up post hospital at next available on 3/17/25 with pre-visit labs.     Future Appointments   Date Time Provider Department Center   2/15/2025  1:30 PM Parris Bates, LEIGH JNPTHR [facility]   2/15/2025  2:15 PM Jen Bunn, OTR JNOCCT [facility]   2/19/2025 10:45 AM  LAB UCLABR [facility]   2/19/2025 11:00 AM UCSCUS2 [facility]   2/19/2025 12:00 PM Cyndie Joyner MD [facility]   3/17/2025 10:45 AM [facility] W[facility]LABR [facility]   3/17/2025 11:35 AM Fabian Reddy, NP [facility] PSA CLIN   3/17/2025  3:30 PM [facility]   4/15/2025 12:55 PM Christin Holt APRN CNP [facility] PSA CLIN   4/22/2025 10:30 AM Yanet Pappas PA-TIM [facility] SLEEP      Please call with questions.      Jesusita Currie RN BSN  [facility] RN Care Coordinator   [facility]   [phone]   [facility]: Cardiomyopathy, Optimization, Rehabilitation, Education

## 2025-02-14 NOTE — PLAN OF CARE
Problem: Pain Acute  Goal: Optimal Pain Control and Function  Outcome: Progressing     Problem: Fall Injury Risk  Goal: Absence of Fall and Fall-Related Injury  Outcome: Progressing      Goal Outcome Evaluation:  Pt is pleasant , calm and cooperative.  LOC: A/O x4   Neuro: Intact   Ambulates: A2  Resp: 4 LO2 NC  Cardiac: SR sahra w/ B undle Branch   Skin: Bruises, Blister filled, yeasty, flakey  GI: WDL   :[employer]. Yanet . Clear  Pain:denies pain.  Diet: low fat low sodium.   NIHSS: 3 Patient can hold her right leg for than 5 seconds  PRN:none    Call light within reach. Pt can verbalize understanding and needs. Left in stable condition in the care of  The oncoming nurse.

## 2025-02-14 NOTE — PROGRESS NOTES
Care Management Follow Up    Length of Stay (days): 3    Expected Discharge Date: 02/17/2025     Concerns to be Addressed: Care progression - discharge planning     Patient plan of care discussed at interdisciplinary rounds: Yes    Anticipated Discharge Disposition:  Therapy rec Transitional Care     Anticipated Discharge Services:  Transitional care  Anticipated Discharge DME:  NA    Patient/family educated on Medicare website which has current facility and service quality ratings:  NA  Education Provided on the Discharge Plan:  Yes per team  Patient/Family in Agreement with the Plan:  NA    Referrals Placed by CM/SW:  NA  Private pay costs discussed: Not applicable    Discussed  Partnership in Safe Discharge Planning  document with patient/family: No     Handoff Completed: No, handoff not indicated or clinically appropriate    Additional Information:  Met with patient at bedside to get the TCU choices.  Patient stated she currently has 3 choices for now:  [facility]    Referrals sent    Social Hx:  Assessment: Follow. Live alone in town home.  Use wheelchair or can walk short distances w/walker. Housekeeping weekly, home O2 3Lpm through [facility]  Last note: 02/14/25  Plan: Therapy rec TCU, referrals sent, pending  Needs: Medical readiness, TCU, PAS  Hand off sent: Yes  Transport: Wheelchair. Transportation costs discussed? No    Next Steps: RNCM to follow for medical progression, recommendations, and final discharge plan.     Amina Hernandez RN     Per provider, Dr. Combs, patient is on IV bumex. Not ready. May be here thru the weekend.     Met with patient at bedside to update on the TCU declined and request for more TCU choices.  Patient wants referrals sent to [facility].

## 2025-02-14 NOTE — PLAN OF CARE
Goal Outcome Evaluation:      Plan of Care Reviewed With: patient    Patient comfortable this shift and vital signs stable.  Tolerated sitting up in recliner and she ate 100% of dinner brought in by a friend.  Down to MRI and back without problem.  No neuro deficits noted.

## 2025-02-14 NOTE — PLAN OF CARE
Problem: Adult Inpatient Plan of Care  Goal: Plan of Care Review  Description: The Plan of Care Review/Shift note should be completed every shift.  The Outcome Evaluation is a brief statement about your assessment that the patient is improving, declining, or no change.  This information will be displayed automatically on your shift  note.  Outcome: Progressing   Goal Outcome Evaluation:       A&Ox4. Denies pain today. A1 out of bed. Using Purewick in bed. Up to chair x2 today. Wound care provided to BLE and lymph treatment provided by OT. TELE SR with BBB. NIH score of 3; NIH assessment now discontinued. Potassium protocol recheck in AM. Mepilex intact to sacrum. VSS. Remains on 4L oxygen baseline. Awaiting TCU placement.

## 2025-02-15 ENCOUNTER — APPOINTMENT (OUTPATIENT)
Dept: OCCUPATIONAL THERAPY | Facility: HOSPITAL | Age: 50
End: 2025-02-15
Payer: COMMERCIAL

## 2025-02-15 LAB
ANION GAP SERPL CALCULATED.3IONS-SCNC: 9 MMOL/L (ref 7–15)
BUN SERPL-MCNC: 15.2 MG/DL (ref 6–20)
CALCIUM SERPL-MCNC: 10.8 MG/DL (ref 8.8–10.4)
CHLORIDE SERPL-SCNC: 100 MMOL/L (ref 98–107)
CREAT SERPL-MCNC: 0.68 MG/DL (ref 0.51–0.95)
EGFRCR SERPLBLD CKD-EPI 2021: >90 ML/MIN/1.73M2
ERYTHROCYTE [DISTWIDTH] IN BLOOD BY AUTOMATED COUNT: 18.2 % (ref 10–15)
GLUCOSE BLDC GLUCOMTR-MCNC: 82 MG/DL (ref 70–99)
GLUCOSE BLDC GLUCOMTR-MCNC: 83 MG/DL (ref 70–99)
GLUCOSE BLDC GLUCOMTR-MCNC: 92 MG/DL (ref 70–99)
GLUCOSE BLDC GLUCOMTR-MCNC: 92 MG/DL (ref 70–99)
GLUCOSE SERPL-MCNC: 84 MG/DL (ref 70–99)
HCO3 SERPL-SCNC: 33 MMOL/L (ref 22–29)
HCT VFR BLD AUTO: 44.8 % (ref 35–47)
HGB BLD-MCNC: 13.8 G/DL (ref 11.7–15.7)
MAGNESIUM SERPL-MCNC: 1.9 MG/DL (ref 1.7–2.3)
MCH RBC QN AUTO: 27.6 PG (ref 26.5–33)
MCHC RBC AUTO-ENTMCNC: 30.8 G/DL (ref 31.5–36.5)
MCV RBC AUTO: 90 FL (ref 78–100)
PLATELET # BLD AUTO: 223 10E3/UL (ref 150–450)
POTASSIUM SERPL-SCNC: 3.6 MMOL/L (ref 3.4–5.3)
RBC # BLD AUTO: 5 10E6/UL (ref 3.8–5.2)
SODIUM SERPL-SCNC: 142 MMOL/L (ref 135–145)
WBC # BLD AUTO: 5.1 10E3/UL (ref 4–11)

## 2025-02-15 PROCEDURE — 82374 ASSAY BLOOD CARBON DIOXIDE: CPT | Performed by: INTERNAL MEDICINE

## 2025-02-15 PROCEDURE — 99233 SBSQ HOSP IP/OBS HIGH 50: CPT | Performed by: INTERNAL MEDICINE

## 2025-02-15 PROCEDURE — 36415 COLL VENOUS BLD VENIPUNCTURE: CPT | Performed by: INTERNAL MEDICINE

## 2025-02-15 PROCEDURE — 250N000013 HC RX MED GY IP 250 OP 250 PS 637: Performed by: INTERNAL MEDICINE

## 2025-02-15 PROCEDURE — 83735 ASSAY OF MAGNESIUM: CPT | Performed by: INTERNAL MEDICINE

## 2025-02-15 PROCEDURE — 97110 THERAPEUTIC EXERCISES: CPT | Mod: GO

## 2025-02-15 PROCEDURE — 120N000001 HC R&B MED SURG/OB

## 2025-02-15 PROCEDURE — 85027 COMPLETE CBC AUTOMATED: CPT | Performed by: INTERNAL MEDICINE

## 2025-02-15 PROCEDURE — 97535 SELF CARE MNGMENT TRAINING: CPT | Mod: GO

## 2025-02-15 PROCEDURE — 250N000011 HC RX IP 250 OP 636: Performed by: INTERNAL MEDICINE

## 2025-02-15 PROCEDURE — 999N000157 HC STATISTIC RCP TIME EA 10 MIN

## 2025-02-15 PROCEDURE — 250N000013 HC RX MED GY IP 250 OP 250 PS 637: Performed by: STUDENT IN AN ORGANIZED HEALTH CARE EDUCATION/TRAINING PROGRAM

## 2025-02-15 PROCEDURE — 80048 BASIC METABOLIC PNL TOTAL CA: CPT | Performed by: INTERNAL MEDICINE

## 2025-02-15 PROCEDURE — 250N000013 HC RX MED GY IP 250 OP 250 PS 637: Performed by: PSYCHIATRY & NEUROLOGY

## 2025-02-15 PROCEDURE — 99232 SBSQ HOSP IP/OBS MODERATE 35: CPT | Performed by: INTERNAL MEDICINE

## 2025-02-15 RX ADMIN — SPIRONOLACTONE 12.5 MG: 25 TABLET, FILM COATED ORAL at 10:29

## 2025-02-15 RX ADMIN — SIMVASTATIN 10 MG: 10 TABLET, FILM COATED ORAL at 21:43

## 2025-02-15 RX ADMIN — HEPARIN SODIUM 5000 UNITS: 5000 INJECTION, SOLUTION INTRAVENOUS; SUBCUTANEOUS at 10:33

## 2025-02-15 RX ADMIN — HEPARIN SODIUM 5000 UNITS: 5000 INJECTION, SOLUTION INTRAVENOUS; SUBCUTANEOUS at 04:26

## 2025-02-15 RX ADMIN — EMPAGLIFLOZIN 10 MG: 10 TABLET, FILM COATED ORAL at 10:29

## 2025-02-15 RX ADMIN — CLOPIDOGREL BISULFATE 75 MG: 75 TABLET ORAL at 10:29

## 2025-02-15 RX ADMIN — MICONAZOLE NITRATE: 20 POWDER TOPICAL at 21:44

## 2025-02-15 RX ADMIN — ESCITALOPRAM 10 MG: 5 TABLET, FILM COATED ORAL at 10:29

## 2025-02-15 RX ADMIN — ASPIRIN 81 MG: 81 TABLET, COATED ORAL at 10:29

## 2025-02-15 RX ADMIN — CEFTRIAXONE SODIUM 1 G: 1 INJECTION, POWDER, FOR SOLUTION INTRAMUSCULAR; INTRAVENOUS at 18:53

## 2025-02-15 RX ADMIN — HEPARIN SODIUM 5000 UNITS: 5000 INJECTION, SOLUTION INTRAVENOUS; SUBCUTANEOUS at 18:56

## 2025-02-15 RX ADMIN — BUMETANIDE 6 MG: 2 TABLET ORAL at 10:29

## 2025-02-15 ASSESSMENT — ACTIVITIES OF DAILY LIVING (ADL)
ADLS_ACUITY_SCORE: 61

## 2025-02-15 NOTE — PLAN OF CARE
Goal Outcome Evaluation:         Problem: Adult Inpatient Plan of Care  Goal: Absence of Hospital-Acquired Illness or Injury  Intervention: Prevent Skin Injury  Recent Flowsheet Documentation  Taken 2/14/2025 2100 by Sandra Cha RN  Body Position: refuses positioning  Taken 2/14/2025 1900 by Sandra Cha RN  Body Position:   heels elevated   supine  Taken 2/14/2025 1700 by Sandra Cha RN  Body Position: (will offer turns when she gets back to bed) position changed independently     Problem: Fluid Volume Excess  Goal: Fluid Balance  Outcome: Progressing         Pt alert and oriented, pleasant, up with 1 assist to pivot to chair.  Pt was able to get herself from chair to commode independently.  Pt has MS and pain in her left leg around all the bumps above her knee.  Lymphedema wraps on.  Blood sugars 91 and 98.  Pt is on 4L O2 via NC which is her baseline.  Pt has a purewick on for the night.  Pt stayed within her fluid restriction for the day.  Pt has a sacral mepilex on, refused repositioning at 2100.  Pt had not been getting her daily weights, her admission weight was stated.  Pt was already in bed but agreed to lb up so I could zero the bed.  She was 125.9 kg which is down about 50 lb from her stated weight at admission.  NSR on telemetry.  Sandra Cha RN

## 2025-02-15 NOTE — PLAN OF CARE
Goal Outcome Evaluation:         Bess sat in the recliner this shift. She is able to pivot from chair to BSC  independently. Supplemental O2 titrated down to 3L. She is a 1 assist with transfers from bed to chair.

## 2025-02-15 NOTE — PLAN OF CARE
Problem: UTI (Urinary Tract Infection)  Goal: Improved Infection Symptoms  Outcome: Progressing   Goal Outcome Evaluation:    Afebrile. Continue IV antibiotics.  Denies any pain. Refused to be repositioned. States that she's comfortable and wants to sleep. Advised patient to  Shift weight if able. Noted that there is a bleeding from previous Heparin injection subcutaneous at Left lower abdomen. Dressing applied.

## 2025-02-15 NOTE — PROGRESS NOTES
CARDIOLOGY PROGRESS NOTE      Assessment/Plan:  1.  Chronic heart failure with preserved ejection fraction (H)-patient appears to be at her baseline, weight down 24 kg, oxygen saturation 98% on 3 L, creatinine stabilized, oral Bumex.  Consider this episode resolved.  2.  Pulmonary hypertension-WHO class III secondary to sleep apnea and morbid obesity.  Estimated pressures probably moderate to severe with 90 to 99 mmHg.  She sees a pulmonologist as an outpatient for pulmonary hypertension.  3.  Multiple sclerosis (H)-so noted to defer to primary care provider.  4.  Class 3 severe obesity due to excess calories with serious comorbidity and body mass index (BMI) of 50.0 to 59.9 in adult (H)-would recommend bariatric procedure, defer to primary care team.  5.  NARCISA on CPAP-so noted.  6.  Lymphedema-chronic, at baseline.    Plan:  1.  Continue current meds.  2.  Cardiology does not have much further inpatient to add, most likely will sign off tomorrow.    Discharge Plannin.  No real cardiac barrier to discharge.  2.  Can follow with primary cardiology team at [facility] office, SHANNAN Anguiano, SHANNAN Parks, and Dr. Diaz.      LOS: 4 days     Subjective:  Interval History:    49-year-old white female being seen on fifth day of hospitalization.  She feels to her her baseline, uses oxygen at home.  No real chest pains, palpitations, significant shortness of breath, PND, orthopnea, or worsened peripheral edema, syncope or dizziness.    Medications  Current Facility-Administered Medications   Medication Dose Route Frequency Provider Last Rate Last Admin    aspirin EC tablet 81 mg  81 mg Oral Daily Gondal, Saad J, MD   81 mg at 02/15/25 1029    bumetanide (BUMEX) tablet 6 mg  6 mg Oral Daily Raúl Sanchez MD   6 mg at 02/15/25 1029    cefTRIAXone (ROCEPHIN) 1 g vial to attach to  mL bag for ADULTS or NS 50 mL bag for PEDS  1 g Intravenous Q24H Bianca Combs MD   1 g at 25  "1831    clopidogrel (PLAVIX) tablet 75 mg  75 mg Oral Daily Eric Blevins MD   75 mg at 02/15/25 1029    empagliflozin (JARDIANCE) tablet 10 mg  10 mg Oral Daily Gondal, Saad J, MD   10 mg at 02/15/25 1029    escitalopram (LEXAPRO) tablet 10 mg  10 mg Oral Daily Gondal, Saad J, MD   10 mg at 02/15/25 1029    heparin ANTICOAGULANT injection 5,000 Units  5,000 Units Subcutaneous Q8H Bianca Combs MD   5,000 Units at 02/15/25 1033    influenza trivalent vaccine for ages 50-64 years (PF) (FLUBLOK) injection 0.5 mL  0.5 mL Intramuscular Prior to discharge Bianca Combs MD        miconazole (MICATIN) 2 % powder   Topical BID Bianca Combs MD   Given at 02/14/25 2110    simvastatin (ZOCOR) tablet 10 mg  10 mg Oral At Bedtime Bianca Combs MD   10 mg at 02/14/25 2109    sodium chloride (PF) 0.9% PF flush 3 mL  3 mL Intracatheter Q8H Gondal, Saad J, MD   3 mL at 02/15/25 0153    spironolactone (ALDACTONE) half-tab 12.5 mg  12.5 mg Oral Daily Gondal, Saad J, MD   12.5 mg at 02/15/25 1029     Objective:   Vital signs in last 24 hours:  Temp:  [98  F (36.7  C)-98.2  F (36.8  C)] 98  F (36.7  C)  Pulse:  [73-91] 86  Resp:  [18-20] 18  BP: ()/(54-64) 115/56  SpO2:  [94 %-98 %] 98 %    Physical Exam:  /56 (BP Location: Left arm)   Pulse 86   Temp 98  F (36.7  C) (Oral)   Resp 18   Ht 1.626 m (5' 4\")   Wt 125.8 kg (277 lb 4.8 oz)   SpO2 98%   BMI 47.60 kg/m      General Appearance:    Alert, cooperative, no distress, appears stated age   Head:    Normocephalic, without obvious abnormality, atraumatic   Throat:   Lips, mucosa, and tongue normal; teeth and gums normal   Neck:   Supple, symmetrical, trachea midline, no adenopathy;        thyroid:  No enlargement/tenderness/nodules; no carotid    bruit or JVD   Back:     Symmetric, no curvature, ROM normal, no CVA tenderness   Lungs:     Clear to auscultation bilaterally, respirations unlabored   Chest wall:    No tenderness or deformity "   Heart:    Regular rate and rhythm, S1 and S2 normal, no murmur, rub   or gallop   Abdomen:     Soft, non-tender, bowel sounds active all four quadrants,     no masses, no organomegaly   Extremities:   Normal, atraumatic, no cyanosis, chronic 2/4 bipedal edema   Pulses:   2+ and symmetric all extremities   Skin:   Skin color, texture, turgor normal, no rashes or lesions     Cardiographics:      ECG: Personally reviewed by myself shows sinus rhythm, rightward axis, incomplete right bundle branch block pattern.    Echocardiogram:   1. Technically difficult study.  2. The left ventricle is normal in size.Image quality does not provide for  detailed assessment of LV systolic function, but is felt to be normal with a  visually estimated ejection fraction of roughly 65%.  3. There is abnormal septal motion c/w right ventricular overload.  4. Probable mild-moderate tricuspid insufficiency (the color Doppler tricuspid  insufficiency jet is poorly visualized).  5. There is severe right ventricular enlargement with severely reduced right  ventricular systolic performance.  6. The left atrium appears enlarged though difficult to quantify due to  inadequate visualization. There is severe right atrial enlargement.  7. Right ventricular systolic pressure relative to right atrial pressure is  moderate-severely increased. The pulmonary artery pressure is estimated to be  70-75 mmHg plus right atrial pressure. In addition, the IVC appears dilated  with decreased phasic variation in caval diameter consistent with elevated  right atrial pressure.     When compared to the prior real-time echocardiogram dated 19 July 2024, the  pulmonary artery pressure estimate measures slightly less on the current  study. The findings are otherwise felt to be fairly similar on both  examinations (although the right ventricular size was previously described as  only mildly enlarged, it is this reader's impression that right ventricular  size was  severely increased on the prior study with concomitant severe  depression and right ventricular systolic performance).      Lab Results:   Recent Labs   Lab 02/15/25  0851   WBC 5.1   HGB 13.8   HCT 44.8        Recent Labs   Lab 02/15/25  0851      CO2 33*   BUN 15.2   .       Lab Results   Component Value Date    TROPONINI 0.03 04/06/2022

## 2025-02-15 NOTE — PROGRESS NOTES
[facility]    Medicine Progress Note - Hospitalist Service    Date of Admission:  2/11/2025    Assessment & Plan   49 year old female with a past medical history of multiple sclerosis, hypertension, diastolic CHF presented to the ED by ambulance for evaluation post fall    1.Status post fall  -Generalized weakness most likely multifactorial and possible subacute to acute cva  -at high risk falls with MS  -lives alone  -severe edema legs  -mri see below of brain  -mri done for spine as well per neurology to check MS status, stable  -will benefit from TCU    2.Possible acute to subacute CVA  -per mri here   -1.  There is a punctate focus of acute or early subacute infarct within the posterior paramedian right frontal white matter.  2.  There are several small chronic infarcts within the cerebellar hemispheres. A single linear infarct in the inferolateral right cerebellum may be more recent.  3.  T2 hyperintensities within the white matter probably reflect a combination of chronic demyelinating plaques given the provided history of multiple sclerosis, and chronic small vessel ischemic changes. No abnormal enhancement to indicate active   demyelination in the brain.  -neurology consulted and not clear if this is true cva vs artifact in MS pt  -neuro recs--adding plavix x 3 weeks and I have added statin  -continue asa    3.MS with ongoing leg weakness, complex with edema  -per neuro they will consider more mri of spine--no new active lesions MS now, expected lesions seen  -she is not on any med for MS, dx >20 years ago    4.Severe edema legs with suspected severe right sided heart failure exacerbation  -volume up severe  -echo  . Technically difficult study.  2. The left ventricle is normal in size.Image quality does not provide for  detailed assessment of LV systolic function, but is felt to be normal with a  visually estimated ejection fraction of roughly 65%.  3. There is abnormal septal  motion c/w right ventricular overload.  4. Probable mild-moderate tricuspid insufficiency (the color Doppler tricuspid  insufficiency jet is poorly visualized).  5. There is severe right ventricular enlargement with severely reduced right  ventricular systolic performance.  6. The left atrium appears enlarged though difficult to quantify due to  inadequate visualization. There is severe right atrial enlargement.  7. Right ventricular systolic pressure relative to right atrial pressure is  moderate-severely increased. The pulmonary artery pressure is estimated to be  70-75 mmHg plus right atrial pressure. In addition, the IVC appears dilated  with decreased phasic variation in caval diameter consistent with elevated  right atrial pressure.  -iv bumex--now po  -appreciate cards seeing  -tele  -doppler legs-neg    5.Pulm HTN, with chronic hypoxia   -suggested on scans  -ct neg for pe  -diureses iv bumex per cards --now po    6.Chronic hypoxic resp failure  -on O2 at home,at 3 liters, this am on 4 liters still--see if we can get her back to her baseline  -she notes she follows with chan here,   -chan did see and just want her to follow up in clinic     7. UTI   -ceftriaxone x 5 days, today last day  >100,000 CFU/mL Escherichia coli Abnormal         Resulting Agency: [facility]     Susceptibility       Escherichia coli     PHILIP     Ampicillin >=32 ug/mL Resistant     Ampicillin/ Sulbactam 16 ug/mL Intermediate     Cefazolin 4 ug/mL Susceptible     Cefepime <=0.12 ug/mL Susceptible     Ceftazidime <=0.5 ug/mL Susceptible     Ceftriaxone <=0.25 ug/mL Susceptible     Ciprofloxacin 0.5 ug/mL Intermediate     Gentamicin <=1 ug/mL Susceptible     Levofloxacin 1 ug/mL Intermediate     Nitrofurantoin <=16 ug/mL Susceptible     Piperacillin/Tazobactam <=4 ug/mL Susceptible     Trimethoprim/Sulfamethoxazole <=1/19 ug/mL Susceptible             8.Lactic acidosis   -improved    9.Elevated trop  -echo--see above  -tele  -no  "cp    10.Hyperbilirubinemia  -checked abd ultrasound     11.Right sided heart failure exacerbation  -iv bumex--now po  -cards  -tele  - Bumex, Aldactone, Jardiance    12.Morbid obesity  -just started Wegovy PTA     13.Mood d/o  - Lexapro, will continue    14.Code-full    Needs tcu and she agrees               Diet: Combination Diet Low Saturated Fat Na <2400mg Diet, No Caffeine Diet  Fluid restriction 1800 ML FLUID    DVT Prophylaxis: Heparin SQ  Brar Catheter: Not present  Lines: None     Cardiac Monitoring: ACTIVE order. Indication: cva  Code Status: Full Code      Clinically Significant Risk Factors        # Hypokalemia: Lowest K = 3.3 mmol/L in last 2 days, will replace as needed     # Hypercalcemia: Highest Ca = 10.8 mg/dL in last 2 days, will monitor as appropriate    # Hypoalbuminemia: Lowest albumin = 3.2 g/dL at 2/11/2025  1:48 PM, will monitor as appropriate     # Hypertension: Noted on problem list  # Acute heart failure with preserved ejection fraction: heart failure noted on problem list, last echo with EF >50%, and receiving IV diuretics           # Severe Obesity: Estimated body mass index is 47.6 kg/m  as calculated from the following:    Height as of this encounter: 1.626 m (5' 4\").    Weight as of this encounter: 125.8 kg (277 lb 4.8 oz)., PRESENT ON ADMISSION     # Financial/Environmental Concerns: none         Social Drivers of Health    Physical Activity: Inactive (3/21/2024)    Exercise Vital Sign     Days of Exercise per Week: 0 days     Minutes of Exercise per Session: 0 min   Social Connections: Unknown (3/1/2022)    Social Connection and Isolation Panel [NHANES]     Frequency of Communication with Friends and Family: Twice a week     Frequency of Social Gatherings with Friends and Family: Twice a week          Disposition Plan     Medically Ready for Discharge: Anticipated in 2-4 Days             Bianca Combs MD  Hospitalist Service  [facility]  Securely " message with [employer] (more info)  Text page via [facility] Paging/Directory   ______________________________________________________________________    Interval History   She feels better each day  Feels less sob and breathing better  Strength is better, noticed when she got to chair yesterday  No stool yet      Physical Exam   Vital Signs: Temp: 98.2  F (36.8  C) Temp src: Axillary BP: 98/54 Pulse: 73   Resp: 18 SpO2: 97 % O2 Device: BiPAP/CPAP Oxygen Delivery: 4 LPM  Weight: 277 lbs 4.8 oz    Constitutional: awake, alert, cooperative, and no apparent distress  Eyes: sclera clear  Respiratory: no increased work of breathing, good air exchange, no retractions, and diminished breath sounds right base and left base  Cardiovascular: regular rate and rhythm and normal S1 and S2  GI: normal bowel sounds, soft, non-distended, and non-tender  Skin: venous stasis legs and nodules   Musculoskeletal: edema is improved in legs, nodules on legs   Neurologic: Mental Status Exam:  Level of Alertness:   awake  Fund of Knowledge:  normal  Attention/Concentration:  normal  Language:  normal  Neuropsychiatric: General: normal, calm, and normal eye contact    Medical Decision Making       35 MINUTES SPENT BY ME on the date of service doing chart review, history, exam, documentation & further activities per the note.      Data     I have personally reviewed the following data over the past 24 hrs:    5.1  \   13.8   / 223     142 100 15.2 /  84   3.6 33 (H) 0.68 \       Imaging results reviewed over the past 24 hrs:   No results found for this or any previous visit (from the past 24 hours).

## 2025-02-16 ENCOUNTER — APPOINTMENT (OUTPATIENT)
Dept: OCCUPATIONAL THERAPY | Facility: HOSPITAL | Age: 50
End: 2025-02-16
Payer: COMMERCIAL

## 2025-02-16 LAB
ANION GAP SERPL CALCULATED.3IONS-SCNC: 6 MMOL/L (ref 7–15)
BACTERIA BLD CULT: NO GROWTH
BACTERIA BLD CULT: NO GROWTH
BUN SERPL-MCNC: 19.2 MG/DL (ref 6–20)
CALCIUM SERPL-MCNC: 10.7 MG/DL (ref 8.8–10.4)
CHLORIDE SERPL-SCNC: 98 MMOL/L (ref 98–107)
CREAT SERPL-MCNC: 0.74 MG/DL (ref 0.51–0.95)
EGFRCR SERPLBLD CKD-EPI 2021: >90 ML/MIN/1.73M2
ERYTHROCYTE [DISTWIDTH] IN BLOOD BY AUTOMATED COUNT: 17.7 % (ref 10–15)
GLUCOSE BLDC GLUCOMTR-MCNC: 76 MG/DL (ref 70–99)
GLUCOSE BLDC GLUCOMTR-MCNC: 82 MG/DL (ref 70–99)
GLUCOSE BLDC GLUCOMTR-MCNC: 98 MG/DL (ref 70–99)
GLUCOSE SERPL-MCNC: 80 MG/DL (ref 70–99)
HCO3 SERPL-SCNC: 35 MMOL/L (ref 22–29)
HCT VFR BLD AUTO: 43.2 % (ref 35–47)
HGB BLD-MCNC: 14 G/DL (ref 11.7–15.7)
MCH RBC QN AUTO: 28.5 PG (ref 26.5–33)
MCHC RBC AUTO-ENTMCNC: 32.4 G/DL (ref 31.5–36.5)
MCV RBC AUTO: 88 FL (ref 78–100)
PLATELET # BLD AUTO: 204 10E3/UL (ref 150–450)
POTASSIUM SERPL-SCNC: 3.5 MMOL/L (ref 3.4–5.3)
RBC # BLD AUTO: 4.91 10E6/UL (ref 3.8–5.2)
SODIUM SERPL-SCNC: 139 MMOL/L (ref 135–145)
WBC # BLD AUTO: 5 10E3/UL (ref 4–11)

## 2025-02-16 PROCEDURE — 999N000157 HC STATISTIC RCP TIME EA 10 MIN

## 2025-02-16 PROCEDURE — 97110 THERAPEUTIC EXERCISES: CPT | Mod: GO

## 2025-02-16 PROCEDURE — 90673 RIV3 VACCINE NO PRESERV IM: CPT | Performed by: INTERNAL MEDICINE

## 2025-02-16 PROCEDURE — G0008 ADMIN INFLUENZA VIRUS VAC: HCPCS | Performed by: INTERNAL MEDICINE

## 2025-02-16 PROCEDURE — 36415 COLL VENOUS BLD VENIPUNCTURE: CPT | Performed by: INTERNAL MEDICINE

## 2025-02-16 PROCEDURE — 80048 BASIC METABOLIC PNL TOTAL CA: CPT | Performed by: INTERNAL MEDICINE

## 2025-02-16 PROCEDURE — 250N000013 HC RX MED GY IP 250 OP 250 PS 637: Performed by: INTERNAL MEDICINE

## 2025-02-16 PROCEDURE — 250N000011 HC RX IP 250 OP 636: Performed by: INTERNAL MEDICINE

## 2025-02-16 PROCEDURE — 120N000001 HC R&B MED SURG/OB

## 2025-02-16 PROCEDURE — 94660 CPAP INITIATION&MGMT: CPT

## 2025-02-16 PROCEDURE — 99232 SBSQ HOSP IP/OBS MODERATE 35: CPT | Performed by: INTERNAL MEDICINE

## 2025-02-16 PROCEDURE — 250N000013 HC RX MED GY IP 250 OP 250 PS 637: Performed by: STUDENT IN AN ORGANIZED HEALTH CARE EDUCATION/TRAINING PROGRAM

## 2025-02-16 PROCEDURE — 250N000013 HC RX MED GY IP 250 OP 250 PS 637: Performed by: PSYCHIATRY & NEUROLOGY

## 2025-02-16 PROCEDURE — 85018 HEMOGLOBIN: CPT | Performed by: INTERNAL MEDICINE

## 2025-02-16 PROCEDURE — 99233 SBSQ HOSP IP/OBS HIGH 50: CPT | Performed by: INTERNAL MEDICINE

## 2025-02-16 PROCEDURE — 85041 AUTOMATED RBC COUNT: CPT | Performed by: INTERNAL MEDICINE

## 2025-02-16 RX ADMIN — CLOPIDOGREL BISULFATE 75 MG: 75 TABLET ORAL at 09:21

## 2025-02-16 RX ADMIN — HEPARIN SODIUM 5000 UNITS: 5000 INJECTION, SOLUTION INTRAVENOUS; SUBCUTANEOUS at 13:15

## 2025-02-16 RX ADMIN — HEPARIN SODIUM 5000 UNITS: 5000 INJECTION, SOLUTION INTRAVENOUS; SUBCUTANEOUS at 20:26

## 2025-02-16 RX ADMIN — ASPIRIN 81 MG: 81 TABLET, COATED ORAL at 09:21

## 2025-02-16 RX ADMIN — EMPAGLIFLOZIN 10 MG: 10 TABLET, FILM COATED ORAL at 09:21

## 2025-02-16 RX ADMIN — BISACODYL 5 MG: 5 TABLET, COATED ORAL at 14:24

## 2025-02-16 RX ADMIN — ESCITALOPRAM 10 MG: 5 TABLET, FILM COATED ORAL at 09:21

## 2025-02-16 RX ADMIN — HEPARIN SODIUM 5000 UNITS: 5000 INJECTION, SOLUTION INTRAVENOUS; SUBCUTANEOUS at 03:04

## 2025-02-16 RX ADMIN — SPIRONOLACTONE 12.5 MG: 25 TABLET, FILM COATED ORAL at 09:21

## 2025-02-16 RX ADMIN — BUMETANIDE 6 MG: 2 TABLET ORAL at 09:22

## 2025-02-16 RX ADMIN — INFLUENZA A VIRUS A/WEST VIRGINIA/30/2022 (H1N1) RECOMBINANT HEMAGGLUTININ ANTIGEN, INFLUENZA A VIRUS A/MASSACHUSETTS/18/2022 (H3N2) RECOMBINANT HEMAGGLUTININ ANTIGEN, AND INFLUENZA B VIRUS B/AUSTRIA/1359417/2021 RECOMBINANT HEMAGGLUTININ ANTIGEN 0.5 ML: 45; 45; 45 INJECTION INTRAMUSCULAR at 13:17

## 2025-02-16 RX ADMIN — SIMVASTATIN 10 MG: 10 TABLET, FILM COATED ORAL at 22:45

## 2025-02-16 ASSESSMENT — ACTIVITIES OF DAILY LIVING (ADL)
ADLS_ACUITY_SCORE: 61

## 2025-02-16 NOTE — PLAN OF CARE
Problem: Adult Inpatient Plan of Care  Goal: Plan of Care Review  Description: The Plan of Care Review/Shift note should be completed every shift.  The Outcome Evaluation is a brief statement about your assessment that the patient is improving, declining, or no change.  This information will be displayed automatically on your shift  note.  Outcome: Progressing     Problem: Adult Inpatient Plan of Care  Goal: Optimal Comfort and Wellbeing  Outcome: Progressing   Goal Outcome Evaluation:    - Patient is alert and oriented x4, vitally stable. On 3L O2 via NC, CPAP at night. Denies pain  - SR on tele  - K protocol in place, recheck in AM  - Female external cath, adequate UO  - Able to make needs known. Call light within reach.

## 2025-02-16 NOTE — PROGRESS NOTES
CARDIOLOGY PROGRESS NOTE      Assessment/Plan:  1.  Chronic heart failure with preserved ejection fraction (H)-patient appears to be at her baseline, weight down 24 kg, oxygen saturation 98% on 3 L, creatinine stabilized.  Consider this episode resolved.  On Aldactone, Jardiance, and oral Bumex.  2.  Pulmonary hypertension-WHO class III secondary to sleep apnea and morbid obesity.  Estimated pressures probably moderate to severe with 90 to 99 mmHg.  She sees a pulmonologist as an outpatient for pulmonary hypertension.  3.  Multiple sclerosis (H)-so noted to defer to primary care provider.  4.  Class 3 severe obesity due to excess calories with serious comorbidity and body mass index (BMI) of 50.0 to 59.9 in adult (H)-would recommend bariatric procedure, defer to primary care team.  5.  NARCISA on CPAP-so noted.  6.  Lymphedema-chronic, at baseline.    Plan:  1.  Continue current meds.  2.  Cardiology does not have much further inpatient to add, we will sign off, available as needed.    Discharge Plannin.  No real cardiac barrier to discharge.  2.  Can follow with primary cardiology team at [facility] office, SHANNAN Anguiano, SHANNAN Parks, and Dr. Diaz.      LOS: 5 days     Subjective:  Interval History:    49-year-old white female being seen on 6th day of hospitalization.  She feels at her baseline, uses oxygen at home.  No real chest pains, palpitations, significant shortness of breath, PND, orthopnea, or worsened peripheral edema, syncope or dizziness.    Medications  Current Facility-Administered Medications   Medication Dose Route Frequency Provider Last Rate Last Admin    aspirin EC tablet 81 mg  81 mg Oral Daily Gondal, Saad J, MD   81 mg at 25 0921    bumetanide (BUMEX) tablet 6 mg  6 mg Oral Daily Raúl Sanchez MD   6 mg at 25 0922    clopidogrel (PLAVIX) tablet 75 mg  75 mg Oral Daily Eric Blevins MD   75 mg at 25 0921    empagliflozin (JARDIANCE) tablet  "10 mg  10 mg Oral Daily Gondal, Saad J, MD   10 mg at 02/16/25 0921    escitalopram (LEXAPRO) tablet 10 mg  10 mg Oral Daily Gondal, Saad J, MD   10 mg at 02/16/25 0921    heparin ANTICOAGULANT injection 5,000 Units  5,000 Units Subcutaneous Q8H Bianca Combs MD   5,000 Units at 02/16/25 1315    miconazole (MICATIN) 2 % powder   Topical BID Bianca Combs MD   Given at 02/15/25 2144    simvastatin (ZOCOR) tablet 10 mg  10 mg Oral At Bedtime Bianca Combs MD   10 mg at 02/15/25 2143    sodium chloride (PF) 0.9% PF flush 3 mL  3 mL Intracatheter Q8H Gondal, Saad J, MD   3 mL at 02/16/25 0923    spironolactone (ALDACTONE) half-tab 12.5 mg  12.5 mg Oral Daily Gondal, Saad J, MD   12.5 mg at 02/16/25 0921     Objective:   Vital signs in last 24 hours:  Temp:  [97.3  F (36.3  C)-98.4  F (36.9  C)] 98  F (36.7  C)  Pulse:  [72-93] 72  Resp:  [12-20] 12  BP: (103-128)/(63-68) 121/66  SpO2:  [91 %-99 %] 98 %    Physical Exam:  /66 (BP Location: Left arm)   Pulse 72   Temp 98  F (36.7  C) (Oral)   Resp 12   Ht 1.626 m (5' 4\")   Wt 125.5 kg (276 lb 11.2 oz)   SpO2 98%   BMI 47.50 kg/m      General Appearance:    Alert, cooperative, no distress, appears stated age   Head:    Normocephalic, without obvious abnormality, atraumatic   Throat:   Lips, mucosa, and tongue normal; teeth and gums normal   Neck:   Supple, symmetrical, trachea midline, no adenopathy;        thyroid:  No enlargement/tenderness/nodules; no carotid    bruit or JVD   Back:     Symmetric, no curvature, ROM normal, no CVA tenderness   Lungs:     Clear to auscultation bilaterally, respirations unlabored   Chest wall:    No tenderness or deformity   Heart:    Regular rate and rhythm, S1 and S2 normal, no murmur, rub   or gallop   Abdomen:     Soft, non-tender, bowel sounds active all four quadrants,     no masses, no organomegaly   Extremities:   Normal, atraumatic, no cyanosis, chronic 2/4 bipedal edema   Pulses:   2+ and symmetric " all extremities   Skin:   Skin color, texture, turgor normal, no rashes or lesions     Cardiographics:      ECG: Personally reviewed by myself shows sinus rhythm, rightward axis, incomplete right bundle branch block pattern.    Echocardiogram:   1. Technically difficult study.  2. The left ventricle is normal in size.Image quality does not provide for  detailed assessment of LV systolic function, but is felt to be normal with a  visually estimated ejection fraction of roughly 65%.  3. There is abnormal septal motion c/w right ventricular overload.  4. Probable mild-moderate tricuspid insufficiency (the color Doppler tricuspid  insufficiency jet is poorly visualized).  5. There is severe right ventricular enlargement with severely reduced right  ventricular systolic performance.  6. The left atrium appears enlarged though difficult to quantify due to  inadequate visualization. There is severe right atrial enlargement.  7. Right ventricular systolic pressure relative to right atrial pressure is  moderate-severely increased. The pulmonary artery pressure is estimated to be  70-75 mmHg plus right atrial pressure. In addition, the IVC appears dilated  with decreased phasic variation in caval diameter consistent with elevated  right atrial pressure.     When compared to the prior real-time echocardiogram dated 19 July 2024, the  pulmonary artery pressure estimate measures slightly less on the current  study. The findings are otherwise felt to be fairly similar on both  examinations (although the right ventricular size was previously described as  only mildly enlarged, it is this reader's impression that right ventricular  size was severely increased on the prior study with concomitant severe  depression and right ventricular systolic performance).      Lab Results:   Recent Labs   Lab 02/16/25  0826   WBC 5.0   HGB 14.0   HCT 43.2        Recent Labs   Lab 02/16/25  0826      CO2 35*   BUN 19.2   .       Lab  Results   Component Value Date    TROPONINI 0.03 04/06/2022

## 2025-02-16 NOTE — PLAN OF CARE
Lymphedema Discharge Summary    Reason for therapy discharge:    All goals and outcomes met, no further needs identified.    Progress towards therapy goal(s). See goals on Care Plan in [facility] electronic health record for goal details.  Goals met    Therapy recommendation(s):    Patient care order placed.    Jen Bunn MOT, OTR/L, CLT 2/16/2025 , 3:12 PM

## 2025-02-16 NOTE — PROGRESS NOTES
Care Management Follow Up    Length of Stay (days): 5    Expected Discharge Date: 02/17/2025    Anticipated Discharge Plan:   Transitional care (TCU) is recommended for continued therapy and skilled nursing.    Transportation: Anticipate medical transport    PT Recommendations: Transitional Care Facility  OT Recommendations:  Transitional Care Facility     Barriers to Discharge: medical stability    Prior Living Situation: town home with alone    Discussed  Partnership in Safe Discharge Planning  document with patient/family: No     Handoff Completed: Yes, non-[facility] PCP: External handoff communication completed    Patient/Spokesperson Updated: No    Additional Information:  Patient lives alone in a single level town home with ramp entry.  She states that she has all necessary DME at home including O2 3Lpm through [facility].  She has housekeeping services weekly and groceries are delivered.  Patient reports she can walk very short distances with walker otherwise uses manual wheelchair for mobility at baseline.   Transitional care (TCU) is recommended for continued therapy and skilled nursing and referrals are pending.   Patient will need wheelchair transportation arranged at discharge.      Next Steps: Coordinate post-acute care services.    Anne Lopez RN

## 2025-02-16 NOTE — PLAN OF CARE
Goal Outcome Evaluation:         Wound care completed on BLE. Bess spent the day in the recliner. PRN bisacodyl given this afternoon.  Continues on fluid restriction. Call light in reach.

## 2025-02-16 NOTE — PLAN OF CARE
Goal Outcome Evaluation:         Problem: Adult Inpatient Plan of Care  Goal: Absence of Hospital-Acquired Illness or Injury  Intervention: Prevent Skin Injury  Recent Flowsheet Documentation  Taken 2/15/2025 1625 by Sandra Cha RN  Body Position: (will offer turns when she gets back to bed) position changed independently     Problem: Fluid Volume Excess  Goal: Fluid Balance  Outcome: Progressing         Pt alert and oriented, nice, has MS.  Pt sat in the chair until after dinner, very tired afterward.  Pt is up with 1 assist and walker, turns better to the left when in bed.  On telemetry, NSR and SR with BBB.  Pt is on 3L O2 via NC which is her baseline.  Lymphedema stockings are on, pt said OT washed her legs today and said to do the vashe tomorrow.  Skin under her breasts is almost completely healed but has some redness in her left side/abdominal fold, miconazole applied.  The area behind her right knee is open, was bleeding a little so I cleaned it and applied a mepilex.  I also applied a new sacral mepilex, left buttock was denuded and bleeding a little.  Blood sugars were 83 and 92.  Pt does well with staying within her fluid restriction.  Had her final dose of rocephin tonight.  Sandra Cha, RN

## 2025-02-16 NOTE — PROGRESS NOTES
[facility]    Medicine Progress Note - Hospitalist Service    Date of Admission:  2/11/2025    Assessment & Plan   49 year old female with a past medical history of multiple sclerosis, hypertension, diastolic CHF presented to the ED by ambulance for evaluation post fall    1.Status post fall  -Generalized weakness most likely multifactorial and possible subacute to acute cva  -at high risk falls with MS  -lives alone  -severe edema legs--improving here   -mri see below of brain  -mri done for spine as well per neurology to check MS status, stable  -will benefit from TCU    2.Possible acute to subacute CVA  -per mri here   -1.  There is a punctate focus of acute or early subacute infarct within the posterior paramedian right frontal white matter.  2.  There are several small chronic infarcts within the cerebellar hemispheres. A single linear infarct in the inferolateral right cerebellum may be more recent.  3.  T2 hyperintensities within the white matter probably reflect a combination of chronic demyelinating plaques given the provided history of multiple sclerosis, and chronic small vessel ischemic changes. No abnormal enhancement to indicate active   demyelination in the brain.  -neurology consulted and not clear if this is true cva vs artifact in MS pt  -neuro recs--adding plavix x 3 weeks and I have added statin  -continue asa    3.MS with ongoing leg weakness, complex with edema  -per neuro they will consider more mri of spine--no new active lesions MS now, expected lesions seen  -she is not on any med for MS, dx >20 years ago    4.Severe edema legs with suspected severe right sided heart failure exacerbation  -volume up severe  -echo  . Technically difficult study.  2. The left ventricle is normal in size.Image quality does not provide for  detailed assessment of LV systolic function, but is felt to be normal with a  visually estimated ejection fraction of roughly 65%.  3. There is  abnormal septal motion c/w right ventricular overload.  4. Probable mild-moderate tricuspid insufficiency (the color Doppler tricuspid  insufficiency jet is poorly visualized).  5. There is severe right ventricular enlargement with severely reduced right  ventricular systolic performance.  6. The left atrium appears enlarged though difficult to quantify due to  inadequate visualization. There is severe right atrial enlargement.  7. Right ventricular systolic pressure relative to right atrial pressure is  moderate-severely increased. The pulmonary artery pressure is estimated to be  70-75 mmHg plus right atrial pressure. In addition, the IVC appears dilated  with decreased phasic variation in caval diameter consistent with elevated  right atrial pressure.  -iv bumex--now po  -appreciate cards seeing  -tele  -doppler legs-neg    5.Pulm HTN, with chronic hypoxia   -suggested on scans  -ct neg for pe  -diureses iv bumex per cards --now po    6.Chronic hypoxic resp failure  -on O2 at home,at 3 liters, this am on 4 liters still--now on 3 liters again  -she notes she follows with [facility] here,   -chan did see and just want her to follow up in clinic     7. UTI   -ceftriaxone x 5 days, finsihed this here   >100,000 CFU/mL Escherichia coli Abnormal         Resulting Agency: [facility]     Susceptibility       Escherichia coli     PHILIP     Ampicillin >=32 ug/mL Resistant     Ampicillin/ Sulbactam 16 ug/mL Intermediate     Cefazolin 4 ug/mL Susceptible     Cefepime <=0.12 ug/mL Susceptible     Ceftazidime <=0.5 ug/mL Susceptible     Ceftriaxone <=0.25 ug/mL Susceptible     Ciprofloxacin 0.5 ug/mL Intermediate     Gentamicin <=1 ug/mL Susceptible     Levofloxacin 1 ug/mL Intermediate     Nitrofurantoin <=16 ug/mL Susceptible     Piperacillin/Tazobactam <=4 ug/mL Susceptible     Trimethoprim/Sulfamethoxazole <=1/19 ug/mL Susceptible             8.Lactic acidosis   -resolved    9.Elevated trop  -echo--see above  -tele  -no  "cp    10.Hyperbilirubinemia  -checked abd ultrasound-negative      11.Right sided heart failure exacerbation  -iv bumex--now po  -cards  -tele  - Bumex, Aldactone, Jardiance    12.Morbid obesity  -just started Wegovy PTA     13.Mood d/o  - Lexapro, will continue    14.Code-full    Needs tcu and she agrees , likely Monday               Diet: Combination Diet Low Saturated Fat Na <2400mg Diet, No Caffeine Diet  Fluid restriction 1800 ML FLUID    DVT Prophylaxis: Heparin SQ  Brar Catheter: Not present  Lines: None     Cardiac Monitoring: ACTIVE order. Indication: cva  Code Status: Full Code      Clinically Significant Risk Factors            # Hypercalcemia: Highest Ca = 10.8 mg/dL in last 2 days, will monitor as appropriate    # Hypoalbuminemia: Lowest albumin = 3.2 g/dL at 2/11/2025  1:48 PM, will monitor as appropriate     # Hypertension: Noted on problem list  # Acute heart failure with preserved ejection fraction: heart failure noted on problem list, last echo with EF >50%, and receiving IV diuretics           # Severe Obesity: Estimated body mass index is 47.6 kg/m  as calculated from the following:    Height as of this encounter: 1.626 m (5' 4\").    Weight as of this encounter: 125.8 kg (277 lb 4.8 oz).      # Financial/Environmental Concerns: none         Social Drivers of Health    Physical Activity: Inactive (3/21/2024)    Exercise Vital Sign     Days of Exercise per Week: 0 days     Minutes of Exercise per Session: 0 min   Social Connections: Unknown (3/1/2022)    Social Connection and Isolation Panel [NHANES]     Frequency of Communication with Friends and Family: Twice a week     Frequency of Social Gatherings with Friends and Family: Twice a week          Disposition Plan     Medically Ready for Discharge: Anticipated Tomorrow             Bianca Combs MD  Hospitalist Service  [facility]  Securely message with [employer] (more info)  Text page via [employer]along Paging/Directory "   ______________________________________________________________________    Interval History   She feels good  No cp or sob  Was up to chair more yesterday  Hopeful about tcu goals  No stool but open to taking something today     Physical Exam   Vital Signs: Temp: 97.3  F (36.3  C) Temp src: Oral BP: 106/63 Pulse: 79   Resp: 18 SpO2: 97 % O2 Device: BiPAP/CPAP Oxygen Delivery: 3 LPM  Weight: 277 lbs 4.8 oz    Constitutional: awake, fatigued, alert, cooperative, and no apparent distress  Eyes: sclera clear  Respiratory: no increased work of breathing, moderate air exchange, no retractions, and diminished breath sounds right base and left base  Cardiovascular: regular rate and rhythm and normal S1 and S2  GI: normal bowel sounds, soft, non-distended, and non-tender  Skin: venous stasis changes legs, nodules on legs   Musculoskeletal: edema is greatly improved  Neurologic: Mental Status Exam:  Level of Alertness:   awake  Attention/Concentration:  normal  Language:  normal  Neuropsychiatric: General: normal, calm, and normal eye contact  Affect: normal and pleasant    Medical Decision Making       35 MINUTES SPENT BY ME on the date of service doing chart review, history, exam, documentation & further activities per the note.      Data     I have personally reviewed the following data over the past 24 hrs:    5.1  \   13.8   / 223     142 100 15.2 /  92   3.6 33 (H) 0.68 \       Imaging results reviewed over the past 24 hrs:   No results found for this or any previous visit (from the past 24 hours).

## 2025-02-17 ENCOUNTER — APPOINTMENT (OUTPATIENT)
Dept: OCCUPATIONAL THERAPY | Facility: HOSPITAL | Age: 50
End: 2025-02-17
Payer: COMMERCIAL

## 2025-02-17 ENCOUNTER — APPOINTMENT (OUTPATIENT)
Dept: PHYSICAL THERAPY | Facility: HOSPITAL | Age: 50
End: 2025-02-17
Payer: COMMERCIAL

## 2025-02-17 LAB
ANION GAP SERPL CALCULATED.3IONS-SCNC: 10 MMOL/L (ref 7–15)
BUN SERPL-MCNC: 19.4 MG/DL (ref 6–20)
CALCIUM SERPL-MCNC: 10.8 MG/DL (ref 8.8–10.4)
CHLORIDE SERPL-SCNC: 98 MMOL/L (ref 98–107)
CREAT SERPL-MCNC: 0.72 MG/DL (ref 0.51–0.95)
EGFRCR SERPLBLD CKD-EPI 2021: >90 ML/MIN/1.73M2
ERYTHROCYTE [DISTWIDTH] IN BLOOD BY AUTOMATED COUNT: 17.5 % (ref 10–15)
GLUCOSE BLDC GLUCOMTR-MCNC: 81 MG/DL (ref 70–99)
GLUCOSE SERPL-MCNC: 86 MG/DL (ref 70–99)
HCO3 SERPL-SCNC: 33 MMOL/L (ref 22–29)
HCT VFR BLD AUTO: 44.9 % (ref 35–47)
HGB BLD-MCNC: 14 G/DL (ref 11.7–15.7)
HOLD SPECIMEN: NORMAL
MCH RBC QN AUTO: 27.8 PG (ref 26.5–33)
MCHC RBC AUTO-ENTMCNC: 31.2 G/DL (ref 31.5–36.5)
MCV RBC AUTO: 89 FL (ref 78–100)
PLATELET # BLD AUTO: 208 10E3/UL (ref 150–450)
POTASSIUM SERPL-SCNC: 3.4 MMOL/L (ref 3.4–5.3)
PROCALCITONIN SERPL IA-MCNC: 0.14 NG/ML
RBC # BLD AUTO: 5.04 10E6/UL (ref 3.8–5.2)
SODIUM SERPL-SCNC: 141 MMOL/L (ref 135–145)
WBC # BLD AUTO: 4.9 10E3/UL (ref 4–11)

## 2025-02-17 PROCEDURE — 250N000013 HC RX MED GY IP 250 OP 250 PS 637: Performed by: INTERNAL MEDICINE

## 2025-02-17 PROCEDURE — 85041 AUTOMATED RBC COUNT: CPT | Performed by: INTERNAL MEDICINE

## 2025-02-17 PROCEDURE — 97116 GAIT TRAINING THERAPY: CPT | Mod: GP

## 2025-02-17 PROCEDURE — 250N000013 HC RX MED GY IP 250 OP 250 PS 637: Performed by: PSYCHIATRY & NEUROLOGY

## 2025-02-17 PROCEDURE — 999N000157 HC STATISTIC RCP TIME EA 10 MIN

## 2025-02-17 PROCEDURE — 85014 HEMATOCRIT: CPT | Performed by: INTERNAL MEDICINE

## 2025-02-17 PROCEDURE — 84145 PROCALCITONIN (PCT): CPT | Performed by: INTERNAL MEDICINE

## 2025-02-17 PROCEDURE — 36415 COLL VENOUS BLD VENIPUNCTURE: CPT | Performed by: INTERNAL MEDICINE

## 2025-02-17 PROCEDURE — 250N000011 HC RX IP 250 OP 636: Performed by: INTERNAL MEDICINE

## 2025-02-17 PROCEDURE — 99232 SBSQ HOSP IP/OBS MODERATE 35: CPT | Performed by: INTERNAL MEDICINE

## 2025-02-17 PROCEDURE — 94660 CPAP INITIATION&MGMT: CPT

## 2025-02-17 PROCEDURE — 250N000013 HC RX MED GY IP 250 OP 250 PS 637: Performed by: STUDENT IN AN ORGANIZED HEALTH CARE EDUCATION/TRAINING PROGRAM

## 2025-02-17 PROCEDURE — 97530 THERAPEUTIC ACTIVITIES: CPT | Mod: GP

## 2025-02-17 PROCEDURE — 80051 ELECTROLYTE PANEL: CPT | Performed by: INTERNAL MEDICINE

## 2025-02-17 PROCEDURE — 97535 SELF CARE MNGMENT TRAINING: CPT | Mod: GO

## 2025-02-17 PROCEDURE — 80048 BASIC METABOLIC PNL TOTAL CA: CPT | Performed by: INTERNAL MEDICINE

## 2025-02-17 PROCEDURE — 120N000001 HC R&B MED SURG/OB

## 2025-02-17 RX ORDER — ASPIRIN 81 MG/1
81 TABLET ORAL DAILY
Qty: 90 TABLET | Refills: 1 | Status: SHIPPED | OUTPATIENT
Start: 2025-02-17

## 2025-02-17 RX ORDER — POTASSIUM CHLORIDE 750 MG/1
30 TABLET, EXTENDED RELEASE ORAL DAILY
Qty: 90 TABLET | Refills: 1 | Status: SHIPPED | OUTPATIENT
Start: 2025-02-17

## 2025-02-17 RX ORDER — AMOXICILLIN 250 MG
1 CAPSULE ORAL 2 TIMES DAILY PRN
Qty: 10 TABLET | Refills: 0 | Status: SHIPPED | OUTPATIENT
Start: 2025-02-17 | End: 2025-02-25

## 2025-02-17 RX ORDER — SIMVASTATIN 10 MG
10 TABLET ORAL AT BEDTIME
Qty: 30 TABLET | Refills: 1 | Status: SHIPPED | OUTPATIENT
Start: 2025-02-17

## 2025-02-17 RX ORDER — SPIRONOLACTONE 25 MG/1
12.5 TABLET ORAL DAILY
Qty: 30 TABLET | Refills: 0 | Status: SHIPPED | OUTPATIENT
Start: 2025-02-17 | End: 2025-04-18

## 2025-02-17 RX ORDER — ACETAMINOPHEN 325 MG/1
650 TABLET ORAL EVERY 4 HOURS PRN
Qty: 20 TABLET | Refills: 0 | Status: SHIPPED | OUTPATIENT
Start: 2025-02-17

## 2025-02-17 RX ORDER — CLOPIDOGREL BISULFATE 75 MG/1
75 TABLET ORAL DAILY
Qty: 16 TABLET | Refills: 0 | Status: SHIPPED | OUTPATIENT
Start: 2025-02-18 | End: 2025-03-06

## 2025-02-17 RX ORDER — BUMETANIDE 2 MG/1
6 TABLET ORAL DAILY
Qty: 360 TABLET | Refills: 1 | Status: SHIPPED | OUTPATIENT
Start: 2025-02-17

## 2025-02-17 RX ORDER — ESCITALOPRAM OXALATE 10 MG/1
10 TABLET ORAL DAILY
Qty: 90 TABLET | Refills: 1 | Status: SHIPPED | OUTPATIENT
Start: 2025-02-17

## 2025-02-17 RX ADMIN — CLOPIDOGREL BISULFATE 75 MG: 75 TABLET ORAL at 09:54

## 2025-02-17 RX ADMIN — ASPIRIN 81 MG: 81 TABLET, COATED ORAL at 09:54

## 2025-02-17 RX ADMIN — POTASSIUM CHLORIDE 30 MEQ: 1500 TABLET, EXTENDED RELEASE ORAL at 09:56

## 2025-02-17 RX ADMIN — ESCITALOPRAM 10 MG: 5 TABLET, FILM COATED ORAL at 09:53

## 2025-02-17 RX ADMIN — BUMETANIDE 6 MG: 2 TABLET ORAL at 09:54

## 2025-02-17 RX ADMIN — MICONAZOLE NITRATE: 20 POWDER TOPICAL at 22:43

## 2025-02-17 RX ADMIN — EMPAGLIFLOZIN 10 MG: 10 TABLET, FILM COATED ORAL at 09:53

## 2025-02-17 RX ADMIN — SIMVASTATIN 10 MG: 10 TABLET, FILM COATED ORAL at 22:44

## 2025-02-17 RX ADMIN — HEPARIN SODIUM 5000 UNITS: 5000 INJECTION, SOLUTION INTRAVENOUS; SUBCUTANEOUS at 04:07

## 2025-02-17 RX ADMIN — SENNOSIDES, DOCUSATE SODIUM 1 TABLET: 50; 8.6 TABLET, FILM COATED ORAL at 09:54

## 2025-02-17 RX ADMIN — SPIRONOLACTONE 12.5 MG: 25 TABLET, FILM COATED ORAL at 09:53

## 2025-02-17 RX ADMIN — HEPARIN SODIUM 5000 UNITS: 5000 INJECTION, SOLUTION INTRAVENOUS; SUBCUTANEOUS at 11:59

## 2025-02-17 ASSESSMENT — ACTIVITIES OF DAILY LIVING (ADL)
ADLS_ACUITY_SCORE: 56
ADLS_ACUITY_SCORE: 58
ADLS_ACUITY_SCORE: 58
ADLS_ACUITY_SCORE: 62
ADLS_ACUITY_SCORE: 58
ADLS_ACUITY_SCORE: 61
ADLS_ACUITY_SCORE: 56
ADLS_ACUITY_SCORE: 56
ADLS_ACUITY_SCORE: 58
ADLS_ACUITY_SCORE: 57
ADLS_ACUITY_SCORE: 57
ADLS_ACUITY_SCORE: 58
ADLS_ACUITY_SCORE: 61
ADLS_ACUITY_SCORE: 58
ADLS_ACUITY_SCORE: 58
ADLS_ACUITY_SCORE: 57
ADLS_ACUITY_SCORE: 57
ADLS_ACUITY_SCORE: 58
ADLS_ACUITY_SCORE: 57
ADLS_ACUITY_SCORE: 61
ADLS_ACUITY_SCORE: 61
ADLS_ACUITY_SCORE: 53
ADLS_ACUITY_SCORE: 57

## 2025-02-17 NOTE — PROGRESS NOTES
"Care Management Follow Up    Length of Stay (days): 6    Expected Discharge Date: 02/17/2025     Concerns to be Addressed: Care progression - discharge planning     Patient plan of care discussed at interdisciplinary rounds: Yes    Anticipated Discharge Disposition:  Therapy rec Transitional Care     Anticipated Discharge Services:  Transitional care  Anticipated Discharge DME:  NA    Patient/family educated on Medicare website which has current facility and service quality ratings:  NA  Education Provided on the Discharge Plan:  Yes per team  Patient/Family in Agreement with the Plan:  NA    Referrals Placed by CM/SW:  NA  Private pay costs discussed: Transportation costs    Discussed  Partnership in Safe Discharge Planning  document with patient/family: No     Handoff Completed: No, handoff not indicated or clinically appropriate    Additional Information:  0815 called Nimesh Méndez on [address] and left a message for Maryuri to return call.    Social Hx:  Assessment: Follow. Live alone in town home.  Use wheelchair or can walk short distances w/walker. Housekeeping weekly, home O2 3Lpm through [facility]  Last note: 02/17/25  Plan: Therapy rec TCU, referrals sent, pending  Needs: Medical readiness, TCU, PAS  Hand off sent: Yes  Transport: Wheelchair. Transportation costs discussed? Yes    Next Steps: RNCM to follow for medical progression, recommendations, and final discharge plan.     Amina Hernandez RN     Per provider, Dr. Combs, patient is ready.    Met with patient at bedside to get more TCU choices. Patient declined, \"Well, I have already given you all the places where I would like to go.\"  Updated patient the TCUs have declined and will need to expand the TCU search. Patient declined, \"No. I just want to go home.\"  RNCM asked patient what kind of support she has at home?  Patient states, \"I need to call my family.\"  Patient would like a few hours for her to call her family     Message sent to " "update Dr. Combs    Rec'd a call from Dr. Combs, patient will need placement. She will reach out to patient's sister for assistance.    1030 met with patient at bedside to get more TCU choices. Patient said to [facility].    1053 rec'd a voicemail from Sara with [facility] [phone] can accept patient, but no bariatric bed available until tomorrow. Patient can arrive anytime after 12:00 PM. Will need complete CPAP settings order, \"No per home use\" order. [facility] will need to be on hold during TCU stay.    Message sent to update Dr. Combs    Met patient at bedside to update and she accepted.  Patient states she has a previous appt set up on Wed and wants to make sure she can attend, 2/19/25.  Patient wants [facility] WC with oxygen transport  Discussed out of pocket cost of [employer] medical transportation by wheelchair with patient. Patient agreed with the plan to have transportation arranged by [facility].      Called  [employer] [facility] to set up a WC ride with oxygen for 2/18/25 between [phone].    [phone]            "

## 2025-02-17 NOTE — PROGRESS NOTES
[facility]    Medicine Progress Note - Hospitalist Service    Date of Admission:  2/11/2025    Assessment & Plan   49 year old female with a past medical history of multiple sclerosis, hypertension, diastolic CHF presented to the ED by ambulance for evaluation post fall. During her work up here on her mri we found a small punctate focus of restricted diffusion within the posterior paramedian right frontal white matter consistent with a tiny recent acute or early subacute infarct.     1.Status post fall  -Generalized weakness most likely multifactorial and possible subacute to acute cva  -at high risk falls with MS  -mri see below of brain  -mri done for spine as well per neurology to check MS status, stable  -will benefit from TCU--she is ready for this     2.Possible acute to subacute CVA  -per mri here   -1.  There is a punctate focus of acute or early subacute infarct within the posterior paramedian right frontal white matter.  2.  There are several small chronic infarcts within the cerebellar hemispheres. A single linear infarct in the inferolateral right cerebellum may be more recent.  3.  T2 hyperintensities within the white matter probably reflect a combination of chronic demyelinating plaques given the provided history of multiple sclerosis, and chronic small vessel ischemic changes. No abnormal enhancement to indicate active   demyelination in the brain.  -neurology consulted and not clear if this is true cva vs artifact in MS pt. So neurology decided to treat as if it is ischemic cva  -neuro recs--adding plavix x 3 weeks and I have added statin  -continue asa  -pt/ot and tcu     3.MS with ongoing leg weakness, complex with edema  -per neuro they will consider more mri of spine--no new active lesions MS now, expected lesions seen  -she is not on any med for MS, dx >20 years ago    4.Severe edema legs with suspected severe right sided heart failure exacerbation  -volume up  severe  -echo  . Technically difficult study.  2. The left ventricle is normal in size.Image quality does not provide for  detailed assessment of LV systolic function, but is felt to be normal with a  visually estimated ejection fraction of roughly 65%.  3. There is abnormal septal motion c/w right ventricular overload.  4. Probable mild-moderate tricuspid insufficiency (the color Doppler tricuspid  insufficiency jet is poorly visualized).  5. There is severe right ventricular enlargement with severely reduced right  ventricular systolic performance.  6. The left atrium appears enlarged though difficult to quantify due to  inadequate visualization. There is severe right atrial enlargement.  7. Right ventricular systolic pressure relative to right atrial pressure is  moderate-severely increased. The pulmonary artery pressure is estimated to be  70-75 mmHg plus right atrial pressure. In addition, the IVC appears dilated  with decreased phasic variation in caval diameter consistent with elevated  right atrial pressure.  -iv bumex--now po  -appreciate cards seeing  -tele  -doppler legs-neg    5.Pulm HTN, with chronic hypoxia , uses 3 liters at home  -suggested on scans  -ct neg for pe  -diureses iv bumex per cards --now po    6.Chronic hypoxic resp failure  -on O2 at home,at 3 liters, and on this now  -she notes she follows with [facility] here,   -chan did see and just want her to follow up in clinic     7. UTI   -ceftriaxone x 5 days, finsihed this here   >100,000 CFU/mL Escherichia coli Abnormal         Resulting Agency: [facility]     Susceptibility       Escherichia coli     PHILIP     Ampicillin >=32 ug/mL Resistant     Ampicillin/ Sulbactam 16 ug/mL Intermediate     Cefazolin 4 ug/mL Susceptible     Cefepime <=0.12 ug/mL Susceptible     Ceftazidime <=0.5 ug/mL Susceptible     Ceftriaxone <=0.25 ug/mL Susceptible     Ciprofloxacin 0.5 ug/mL Intermediate     Gentamicin <=1 ug/mL Susceptible     Levofloxacin 1 ug/mL  "Intermediate     Nitrofurantoin <=16 ug/mL Susceptible     Piperacillin/Tazobactam <=4 ug/mL Susceptible     Trimethoprim/Sulfamethoxazole <=1/19 ug/mL Susceptible             8.Lactic acidosis   -resolved    9.Elevated trop  -echo--see above  -tele  -no cp    10.Hyperbilirubinemia  -checked abd ultrasound-negative      11.Right sided heart failure exacerbation  -iv bumex--now po  -cards  -tele  - Bumex, Aldactone, Jardiance--will add  back home dose K today     12.Morbid obesity  -just started Wegovy PTA     13.Mood d/o  - Lexapro, will continue    14.Some ecchymosis on abd  -I suspect this is from hep shots while on plavix  -hold hep, check cbc and procal, if elevated wbc or drop in hgb consider further eval  -wbc normal and hgb stable., procal neg    14.Code-full    Needs tcu, ready , awaiting acceptance now  -I did call sister to update and made it clear she needs TCU and not home with home care and pt needs to allow care management ability to look at more options              Diet: Combination Diet Low Saturated Fat Na <2400mg Diet, No Caffeine Diet  Fluid restriction 1800 ML FLUID    DVT Prophylaxis: Heparin subcutaneous--hold with above ecchymosis today on abd  Brar Catheter: Not present  Lines: None     Cardiac Monitoring: ACTIVE order. Indication: cva  Code Status: Full Code      Clinically Significant Risk Factors            # Hypercalcemia: Highest Ca = 10.8 mg/dL in last 2 days, will monitor as appropriate    # Hypoalbuminemia: Lowest albumin = 3.2 g/dL at 2/11/2025  1:48 PM, will monitor as appropriate     # Hypertension: Noted on problem list  # Chronic heart failure with preserved ejection fraction: heart failure noted on problem list and last echo with EF >50%           # Severe Obesity: Estimated body mass index is 47.5 kg/m  as calculated from the following:    Height as of this encounter: 1.626 m (5' 4\").    Weight as of this encounter: 125.5 kg (276 lb 11.2 oz).      # Financial/Environmental " Concerns: none         Social Drivers of Health    Physical Activity: Inactive (3/21/2024)    Exercise Vital Sign     Days of Exercise per Week: 0 days     Minutes of Exercise per Session: 0 min   Social Connections: Unknown (3/1/2022)    Social Connection and Isolation Panel [NHANES]     Frequency of Communication with Friends and Family: Twice a week     Frequency of Social Gatherings with Friends and Family: Twice a week          Disposition Plan     Medically Ready for Discharge: Anticipated Today             Bianca Combs MD  Hospitalist Service  [facility]  Securely message with [employer] (more info)  Text page via [employer] Paging/Directory   ______________________________________________________________________    Interval History   She feels good, no sob  Thinks she is getting stronger each day--told me she sat up in chair all day yesterday  No new complaints  Did not yet have stool ,but did take a prn   Feels ready for tcu    Physical Exam   Vital Signs: Temp: 98.3  F (36.8  C) Temp src: Axillary BP: 105/58 Pulse: 81   Resp: 20 SpO2: 96 % O2 Device: BiPAP/CPAP Oxygen Delivery: 3 LPM  Weight: 276 lbs 11.2 oz    Constitutional: awake, fatigued, alert, cooperative, and no apparent distress  Eyes: sclera clear  Respiratory: no increased work of breathing, good air exchange, no retractions, and clear to auscultation  Cardiovascular: regular rate and rhythm and normal S1 and S2  GI: normal bowel sounds, soft, non-distended, and non-tender  Skin: venous stasis on legs with nodules, on abd pannus there is ecchymosis  noted, not tender and not warm  Musculoskeletal: edema in legs has improved  Neurologic: Mental Status Exam:  Level of Alertness:   awake  Fund of Knowledge:  normal  Attention/Concentration:  normal  Language:  normal  Neuropsychiatric: General: normal, calm, and normal eye contact  Affect: normal and pleasant    Medical Decision Making       35 MINUTES SPENT BY ME on the date  of service doing chart review, history, exam, documentation & further activities per the note.      Data     I have personally reviewed the following data over the past 24 hrs:    N/A  \   N/A   / N/A     141 98 19.4 /  86   3.4 33 (H) 0.72 \       Imaging results reviewed over the past 24 hrs:   No results found for this or any previous visit (from the past 24 hours).

## 2025-02-17 NOTE — PLAN OF CARE
Goal Outcome Evaluation:       Bess denied pain. She sat in the recliner throughout the day. Fluid I/O's monitored. Large area of erythema noted in abdominal area. Area outlined and provider notified. Wound care on BLE provided. Call light in reach.

## 2025-02-17 NOTE — PLAN OF CARE
Problem: Fluid Volume Excess  Goal: Fluid Balance  Outcome: Progressing     Problem: UTI (Urinary Tract Infection)  Goal: Improved Infection Symptoms  Outcome: Progressing         Goal Outcome Evaluation:  Patient is aox4. Patient denied pain, SOB, and dizziness. VSS. Using cpap at night with 3L oxygen bled in. Also on oxygen at baseline. Has SR with 1st degree AVB, BBB, and prolonged QT. Patient's on oral diuretics. Voiding adequately using purewick. Tolerating fluid restriction. Lymphedema wraps on BLE. Bed alarm on for safety. Call-light within reach. Pending TCU placement.

## 2025-02-17 NOTE — PLAN OF CARE
Goal Outcome Evaluation:         Problem: Adult Inpatient Plan of Care  Goal: Optimal Comfort and Wellbeing  Intervention: Monitor Pain and Promote Comfort  Recent Flowsheet Documentation  Taken 2/16/2025 1619 by Sandra Cha RN  Pain Management Interventions: emotional support     Problem: Fluid Volume Excess  Goal: Fluid Balance  Outcome: Progressing         Pt alert and oriented, up with standby assist and walker to chair and commode.  Needs help getting her legs into bed.  Left leg still more swollen than right, lymphedema stockings on.  Blood sugars 76 and 98.  Pt is on 3L O2 at baseline, CPAP on at night.  Pt has small open areas on her buttock and behind her right knee which have a mepilex on them.  No BM yet.  Sandra Cha, RN

## 2025-02-18 ENCOUNTER — LAB REQUISITION (OUTPATIENT)
Dept: LAB | Facility: CLINIC | Age: 50
End: 2025-02-18
Payer: COMMERCIAL

## 2025-02-18 VITALS
DIASTOLIC BLOOD PRESSURE: 66 MMHG | OXYGEN SATURATION: 100 % | SYSTOLIC BLOOD PRESSURE: 117 MMHG | HEART RATE: 81 BPM | HEIGHT: 64 IN | RESPIRATION RATE: 18 BRPM | BODY MASS INDEX: 46.67 KG/M2 | TEMPERATURE: 98.4 F | WEIGHT: 273.37 LBS

## 2025-02-18 DIAGNOSIS — I50.32 CHRONIC DIASTOLIC (CONGESTIVE) HEART FAILURE (H): ICD-10-CM

## 2025-02-18 PROCEDURE — 99239 HOSP IP/OBS DSCHRG MGMT >30: CPT | Performed by: INTERNAL MEDICINE

## 2025-02-18 PROCEDURE — 250N000013 HC RX MED GY IP 250 OP 250 PS 637: Performed by: INTERNAL MEDICINE

## 2025-02-18 PROCEDURE — 250N000013 HC RX MED GY IP 250 OP 250 PS 637: Performed by: STUDENT IN AN ORGANIZED HEALTH CARE EDUCATION/TRAINING PROGRAM

## 2025-02-18 PROCEDURE — 250N000013 HC RX MED GY IP 250 OP 250 PS 637: Performed by: PSYCHIATRY & NEUROLOGY

## 2025-02-18 RX ADMIN — EMPAGLIFLOZIN 10 MG: 10 TABLET, FILM COATED ORAL at 08:56

## 2025-02-18 RX ADMIN — CLOPIDOGREL BISULFATE 75 MG: 75 TABLET ORAL at 08:56

## 2025-02-18 RX ADMIN — POTASSIUM CHLORIDE 30 MEQ: 1500 TABLET, EXTENDED RELEASE ORAL at 08:56

## 2025-02-18 RX ADMIN — ASPIRIN 81 MG: 81 TABLET, COATED ORAL at 08:56

## 2025-02-18 RX ADMIN — ESCITALOPRAM 10 MG: 5 TABLET, FILM COATED ORAL at 08:56

## 2025-02-18 RX ADMIN — SPIRONOLACTONE 12.5 MG: 25 TABLET, FILM COATED ORAL at 08:56

## 2025-02-18 RX ADMIN — BUMETANIDE 6 MG: 2 TABLET ORAL at 08:56

## 2025-02-18 ASSESSMENT — ACTIVITIES OF DAILY LIVING (ADL)
ADLS_ACUITY_SCORE: 53

## 2025-02-18 NOTE — PLAN OF CARE
"  Problem: Adult Inpatient Plan of Care  Goal: Absence of Hospital-Acquired Illness or Injury  Outcome: Met  Intervention: Identify and Manage Fall Risk  Recent Flowsheet Documentation  Taken 2/18/2025 0020 by Annemarie Dueñas RN  Safety Promotion/Fall Prevention:   assistive device/personal items within reach   clutter free environment maintained   nonskid shoes/slippers when out of bed   safety round/check completed  Intervention: Prevent Skin Injury  Recent Flowsheet Documentation  Taken 2/18/2025 0049 by Annemarie Dueñas RN  Body Position: heels elevated  Intervention: Prevent Infection  Recent Flowsheet Documentation  Taken 2/18/2025 0020 by Annemarie Dueñas RN  Infection Prevention:   hand hygiene promoted   rest/sleep promoted   single patient room provided  Goal: Optimal Comfort and Wellbeing  Outcome: Met     Problem: Pain Acute  Goal: Optimal Pain Control and Function  Outcome: Met  Intervention: Prevent or Manage Pain  Recent Flowsheet Documentation  Taken 2/18/2025 0020 by Annemarie Dueñas RN  Medication Review/Management: medications reviewed   Goal Outcome Evaluation:       Pt. Alert and oriented. Telemetry SR with 1st degree AVB, BBB, T wave inversion. On CPAP for the night 3L O2 bled in. Purewick on for voiding. Lymphedema wraps on. No pain. Using call light for all needs. Bed alarm on for safety. VSS. Continue with current plan of care, discharge to TCU today at 1310 with  transport.    /59 (BP Location: Left arm)   Pulse 74   Temp 97.5  F (36.4  C) (Axillary)   Resp 20   Ht 1.626 m (5' 4\")   Wt 124 kg (273 lb 4.8 oz)   SpO2 97%   BMI 46.91 kg/m      Annemarie Dueñas RN                   "

## 2025-02-18 NOTE — PROGRESS NOTES
Care Management Discharge Note    Discharge Date: 02/18/2025       Discharge Disposition: [facility]    Discharge Services: Transportation Services, [facility]    Discharge DME: None    Discharge Transportation: [facility] WC ride with oxygen between [id number].     Private pay costs discussed: transportation costs    Does the patient's insurance plan have a 3 day qualifying hospital stay waiver?  No    PAS Confirmation Code: [id number]  Patient/family educated on Medicare website which has current facility and service quality ratings: no    Education Provided on the Discharge Plan: Yes  Persons Notified of Discharge Plans: Patient  Patient/Family in Agreement with the Plan: yes    Handoff Referral Completed: No, handoff not indicated or clinically appropriate    Additional Information:  Patient discharge to [facility] via [employer] WC ride with oxygen between [id number].     Orders sent to [facility]    Amina Hernandez RN

## 2025-02-18 NOTE — PLAN OF CARE
Goal Outcome Evaluation:                      A&Ox4. Assist of 2 with walker and gait belt. Sat up in chair for most of shift. 720ml of fluid this shift. Denies pain. 96% on 3L O2, CPAP at bedtime. Neuros intact. Plan is to discharge to TCU tomorrow.

## 2025-02-18 NOTE — PLAN OF CARE
Discharged to TCU with [facility] transport. All belongings sent. Provided pt with copy of AVS.

## 2025-02-18 NOTE — DISCHARGE SUMMARY
[facility]    Discharge Summary  Hospitalist    Date of Admission:  2/11/2025  Date of Discharge:  2/18/2025  Discharging Provider: lEiazar Vargas MD  Date of Service (when I saw the patient): 02/18/25    Discharge Diagnoses   Fall  Possible acute to subacute CVA  MS with weakness  Obesity related hypoventilation syndrome  NARCISA  Acute Exacerbation of Chronic HFpEF and Right Heart Failure Resolved   Pulmonary hypertension with chronic hypoxia  UTI    History of Present Illness   Bess Enamorado is an 49 year old female who presented with fall    Hospital Course   49 year old female with a past medical history of multiple sclerosis, hypertension, diastolic CHF presented to the ED by ambulance for evaluation post fall. During her work up here on her mri we found a small punctate focus of restricted diffusion within the posterior paramedian right frontal white matter consistent with a tiny recent acute or early subacute infarct.      1.Status post fall  -Generalized weakness most likely multifactorial and possible subacute to acute cva  -at high risk falls with MS  -mri see below of brain  -mri done for spine as well per neurology to check MS status, stable  -Discharge to TCU     2.Possible acute to subacute CVA  -MRI report as below  -neurology consulted a: adding plavix x 3 weeks and I have added statin  -continue asa  -pt/ot and tcu      3.MS with ongoing leg weakness, complex with edema  -mri of spine--no new active lesions MS now, expected lesions seen  -she is not on any med for MS, dx >20 years ago     4.Acute Exacerbation of Chronic HFpEF and Right Heart Failure Resolved   -volume up severe, legs edema  -echo: EF 65%; detail as below  -iv bumex--now po  -appreciate cardiology consultation  -tele  -doppler legs-neg     5.Pulm HTN, with chronic hypoxia , uses 3 liters at home  -suggested on scans  -ct neg for pe  -diureses iv bumex per cards --now po     6.Chronic hypoxic resp  failure  Possible NARCISA  -on O2 at home,at 3 liters, and on this now  -she notes she follows with chan here,   -pulm did see and just want her to follow up in clinic   -CPAP at night; follow up with pcp for ouapt sleep study     7. UTI due to E Coli  -ceftriaxone x 5 days, finsihed     8.Lactic acidosis   -resolved      9.Morbid obesity  -just started Wegovy PTA; Hold in TCU as per TCU requested;resume when going home     10.Mood d/o  - Lexapro, will continue    11. Type 2 MI/Demand Ischemia due to Heart failure    -mild elevated trop  -no specific treatments needed      Eliazar Vargas MD    Significant Results and Procedures   See below    Pending Results     Unresulted Labs Ordered in the Past 30 Days of this Admission       No orders found from 1/12/2025 to 2/12/2025.            Code Status   Full Code       Primary Care Physician   Mikala Luna    General.  Awake alert oriented not in acute distress. Morbid obesity  HEENT.  Pupils equal round react to light, anicteric, EOM intact.  Neck supple no JVD.  CVS regular rhythm no murmur gallops.  Lungs.  Clear to auscultation bilateral no wheezing or rales.  Abdomen.  Soft nontender bowel sounds present.  Extremities.  No edema no calf tenderness.  Neurological.  General weakness  Skin no rash. No pallor.  Psych. Normal mood.      Discharge Disposition   Discharged to TCU  Condition at discharge: Fair    Consultations This Hospital Stay   CARE MANAGEMENT / SOCIAL WORK IP CONSULT  PHYSICAL THERAPY ADULT IP CONSULT  OCCUPATIONAL THERAPY ADULT IP CONSULT  LYMPHEDEMA THERAPY IP CONSULT  CARDIOLOGY IP CONSULT  NEUROLOGY IP CONSULT  WOUND OSTOMY CONTINENCE NURSE  IP CONSULT  NEUROLOGY IP STROKE CONSULT  SPEECH LANGUAGE PATH ADULT IP CONSULT  PHARMACY IP CONSULT  PHARMACY IP CONSULT  PHARMACY IP CONSULT  PHYSICAL THERAPY ADULT IP CONSULT  OCCUPATIONAL THERAPY ADULT IP CONSULT  REHAB ADMISSIONS LIAISON IP CONSULT  CARE MANAGEMENT / SOCIAL WORK IP CONSULT  PULMONARY  IP CONSULT  SOCIAL WORK IP CONSULT  CARE MANAGEMENT / SOCIAL WORK IP CONSULT  PHYSICAL THERAPY ADULT IP CONSULT  OCCUPATIONAL THERAPY ADULT IP CONSULT    Time Spent on this Encounter   I, Eliazar Vargas MD, personally saw the patient today and spent greater than 30 minutes discharging this patient.    Discharge Orders      Primary Care - Care Coordination Referral      General info for SNF    Length of Stay Estimate: Short Term Care: Estimated # of Days 31-90  Condition at Discharge: Improving  Level of care:skilled   Rehabilitation Potential: Fair  Admission H&P remains valid and up-to-date: Yes  Recent Chemotherapy: N/A  Use Nursing Home Standing Orders: Yes     Mantoux instructions    Give two-step Mantoux (PPD) Per Facility Policy Yes     Follow Up and recommended labs and tests    Follow up with [facility] physician.  The following labs/tests are recommended: cbc, bmp.     Reason for your hospital stay    Fall  CVA  MS  UTI     Activity - Up with nursing assistance     CPAP - Continue Home CPAP    CPAP +5 cm H2O, goal spo2 >90%; for sleep     Physical Therapy Adult Consult    Evaluate and treat as clinically indicated.    Reason:  falls, cva, MS     Occupational Therapy Adult Consult    Evaluate and treat as clinically indicated.    Reason:  falls, cva, MS     Fall precautions     Diet    Follow this diet upon discharge: Current Diet:Orders Placed This Encounter      Fluid restriction 1800 ML FLUID      Combination Diet Low Saturated Fat Na <2400mg Diet, No Caffeine Diet     Discharge Medications   Current Discharge Medication List        START taking these medications    Details   acetaminophen (TYLENOL) 325 MG tablet Take 2 tablets (650 mg) by mouth every 4 hours as needed for mild pain or other (and adjunct with moderate or severe pain or per patient request).  Qty: 20 tablet, Refills: 0    Associated Diagnoses: Pain      clopidogrel (PLAVIX) 75 MG tablet Take 1 tablet (75 mg) by mouth daily for 16  days.  Qty: 16 tablet, Refills: 0    Associated Diagnoses: Ischemic cerebrovascular accident (CVA) (H)      miconazole (MICATIN) 2 % external powder Apply topically 2 times daily for 4 days. Under breasts skin area  Qty: 43 g, Refills: 0    Associated Diagnoses: Rash      senna-docusate (SENOKOT-S/PERICOLACE) 8.6-50 MG tablet Take 1 tablet by mouth 2 times daily as needed for constipation.  Qty: 10 tablet, Refills: 0    Associated Diagnoses: Slow transit constipation      simvastatin (ZOCOR) 10 MG tablet Take 1 tablet (10 mg) by mouth at bedtime.  Qty: 30 tablet, Refills: 1    Associated Diagnoses: Ischemic cerebrovascular accident (CVA) (H)           CONTINUE these medications which have CHANGED    Details   aspirin (ASPIRIN LOW DOSE) 81 MG EC tablet Take 1 tablet (81 mg) by mouth daily.  Qty: 90 tablet, Refills: 1    Associated Diagnoses: Chronic heart failure with preserved ejection fraction (H)      bumetanide (BUMEX) 2 MG tablet Take 3 tablets (6 mg) by mouth daily.  Qty: 360 tablet, Refills: 1    Associated Diagnoses: Chronic heart failure with preserved ejection fraction (H)      empagliflozin (JARDIANCE) 10 MG TABS tablet Take 1 tablet (10 mg) by mouth daily.  Qty: 90 tablet, Refills: 1    Associated Diagnoses: Acute on chronic congestive heart failure, unspecified heart failure type (H)      escitalopram (LEXAPRO) 10 MG tablet Take 1 tablet (10 mg) by mouth daily.  Qty: 90 tablet, Refills: 1    Associated Diagnoses: Generalized anxiety disorder; Moderate episode of recurrent major depressive disorder (H)      potassium chloride sheila ER (KLOR-CON M10) 10 MEQ CR tablet Take 3 tablets (30 mEq) by mouth daily.  Qty: 90 tablet, Refills: 1    Associated Diagnoses: Chronic heart failure with preserved ejection fraction (H)      semaglutide-weight management (WEGOVY) 0.25 MG/0.5ML pen Inject 0.5 mLs (0.25 mg) subcutaneously once a week. On Wednesdays    Comments: Ok to hold in TCU  Associated Diagnoses: Chronic  heart failure with preserved ejection fraction (H); Morbid obesity (H)      spironolactone (ALDACTONE) 25 MG tablet Take 0.5 tablets (12.5 mg) by mouth daily.  Qty: 30 tablet, Refills: 0    Associated Diagnoses: Acute on chronic congestive heart failure, unspecified heart failure type (H)           CONTINUE these medications which have NOT CHANGED    Details   Emollient (GOLD BOND MEDICATED BODY EX) Externally apply 1 Application topically 2 times daily as needed           STOP taking these medications       acetaminophen (TYLENOL) 650 MG CR tablet Comments:   Reason for Stopping:             Allergies   No Known Allergies  Data   Most Recent 3 CBC's:  Recent Labs   Lab Test 02/17/25  0753 02/16/25  0826 02/15/25  0851   WBC 4.9 5.0 5.1   HGB 14.0 14.0 13.8   MCV 89 88 90    204 223      Most Recent 3 BMP's:  Recent Labs   Lab Test 02/17/25  0752 02/17/25  0405 02/16/25  2054 02/16/25  1710 02/16/25  0826 02/15/25  1126 02/15/25  0851     --   --   --  139  --  142   POTASSIUM 3.4  --   --   --  3.5  --  3.6   CHLORIDE 98  --   --   --  98  --  100   CO2 33*  --   --   --  35*  --  33*   BUN 19.4  --   --   --  19.2  --  15.2   CR 0.72  --   --   --  0.74  --  0.68   ANIONGAP 10  --   --   --  6*  --  9   EVITA 10.8*  --   --   --  10.7*  --  10.8*   GLC 86 81 98   < > 80   < > 84    < > = values in this interval not displayed.     Most Recent 2 LFT's:  Recent Labs   Lab Test 02/12/25  0720 02/11/25  1348   AST 31 37   ALT 9 9   ALKPHOS 65 74   BILITOTAL 2.7* 3.2*     Most Recent INR's and Anticoagulation Dosing History:  Anticoagulation Dose History          Latest Ref Rng & Units 4/19/2013 2/11/2022 4/22/2024   Recent Dosing and Labs   INR 0.85 - 1.15 0.96  1.23  1.35      Most Recent 3 Troponin's:  Recent Labs   Lab Test 03/04/21  2127   TROPI <0.015     Most Recent Cholesterol Panel:  Recent Labs   Lab Test 02/12/25  0720   CHOL 81   LDL 43   HDL 11*   TRIG 134     Most Recent 6 Bacteria Isolates From  Any Culture (See EPIC Reports for Culture Details):  Recent Labs   Lab Test 03/04/21  2304 03/04/21  2216 03/04/21  2127 02/25/20  1115 02/24/20  1325 02/24/20  1231   CULT 50,000 to 100,000 colonies/mL  Proteus mirabilis  *  10,000 to 50,000 colonies/mL  Strain 2  Proteus mirabilis  * Cultured on the 1st day of incubation:  Moraxella nonliquefaciens  Susceptibility testing done on previous specimen  *  Critical Value/Significant Value, preliminary result only, called to and read back by  Joceline Sierra RN 03/05/21 @2108 CARLOS    Cultured on the 2nd day of incubation:  Parvimonas micra  *  Critical Value/Significant Value, preliminary result only, called to and read back by  GILES ALBARADO RN 3.6.21 AT 1344. JL    Cultured on the 1st day of incubation:  Staphylococcus epidermidis  *  Critical Value/Significant Value called to and read back by  Ariel Robbins RN on 3.6.2021 at 1421. KVO    (Note)  NEGATIVE for the following: Staphylococcus spp., Staph aureus, Staph  epidermidis, Staph lugdunensis, Streptococcus spp., Strep pneumoniae,  Strep pyogenes, Strep agalactiae, Strep anginosus group, Enterococcus  faecalis, Enterococcus faecium, and Listeria spp. by Verigene  multiplex nucleic acid test. Final identification and antimicrobial  susceptibility testing will be verified by standard methods.    Critical Value/Significant Value called to and read back by Ariel Robbins RN 1627 03.06.2021 NM   Cultured on the 1st day of incubation:  Moraxella nonliquefaciens  Beta lactamase positive  *  Critical Value/Significant Value, preliminary result only, called to and read back by  YFN SIERRA RN 03/05/21 1952 EH.    (Note)  NEGATIVE for the following organisms and resistance markers:  Acinetobacter sp., Citrobacter sp., Enterobacter sp., Proteus sp., E.  coli, K. pneumoniae/oxytoca, P. aeruginosa, CTX-M, KPC, NDM, VIM, IMP  and OXA by Verigene multiplex nucleic acid test. Final identification  and antimicrobial susceptibility  testing will be verified by standard  methods.    Specimen tested with Verigene multiplex, gram-negative blood culture  nucleic acid test for the following targets: Acinetobacter sp.,  Citrobacter sp., Enterobacter sp., Proteus sp., E. coli, K.  pneumoniae/oxytoca, P. aeruginosa, and the following resistance  markers: CTXM, KPC, NDM, VIM, IMP and OXA.    Critical Value/Significant Value called to and read back by Bianca Hernandez RN at 2212 on 3.5.21 CW   No growth Cultured on the 1st day of incubation:  Moraxella nonliquefaciens  Identification obtained by MALDI-TOF mass spectrometry research use only database. Test   characteristics determined and verified by the [facility]   ([facility]) [place], [place].  *  Critical Value/Significant Value, preliminary result only, called to and read back by  Alysia Ibarra RN 2/25/20 @ 0656 TF    (Note)  NEGATIVE for the following organisms and resistance markers:  Acinetobacter sp., Citrobacter sp., Enterobacter sp., Proteus sp., E.  coli, K. pneumoniae/oxytoca, P. aeruginosa, CTX-M, KPC, NDM, VIM, IMP  and OXA by Verigene multiplex nucleic acid test. Final identification  and antimicrobial susceptibility testing will be verified by standard  methods.    Critical Value/Significant Value called to and read back by Ibeth Ernandez RN 2/25/20 @0926 BH   Cultured on the 1st day of incubation:  Moraxella nonliquefaciens  Identification obtained by MALDI-TOF mass spectrometry research use only database. Test   characteristics determined and verified by the [facility]   ([facility]) [place], [place].  *  Critical Value/Significant Value, preliminary result only, called to and read back by  Alysia Ibarra RN 2/25/20 @ 0656 TF    Susceptibility testing done on previous specimen     Most Recent TSH, T4 and A1c Labs:  Recent Labs   Lab Test 02/12/25  0720   TSH 3.52   A1C 5.6     Results for orders placed or performed during the hospital encounter  of 02/11/25   CT Chest w Contrast    Narrative    EXAM: CT CHEST W CONTRAST  LOCATION: [facility]  DATE: 2/11/2025    INDICATION: Hypoxia, crackles on lung exam, fever  COMPARISON: CT pulmonary angiogram 7/18/2024  TECHNIQUE: CT chest with IV contrast. Multiplanar reformats were obtained. Dose reduction techniques were used.    CONTRAST: isovue 370 90ml    FINDINGS:   LUNGS AND PLEURA: Detailed assessment of the lung parenchyma is suboptimal due to respiratory motion-related blurring. No airspace opacities or interlobular septal thickening. Tiny benign calcified granulomata are present in both lung bases. Central   airways are patent. No pleural effusion.    MEDIASTINUM: There are mitral annular calcifications. Left heart chambers are normal in size. Dilated right ventricle with normal right atrium. The main pulmonary artery is severely enlarged measuring 4.5 cm in diameter. The right and left pulmonary   arteries and their proximal branches are also dilated and subsegmental pulmonary arteries have mild tortuosity. No pulmonary artery filling defects, calcifications, or webs. Caliber of the pulmonary veins is normal. No enlarged mediastinal or hilar lymph   nodes. Mild heterogeneity of the thyroid gland but no discrete nodules. Esophagus is decompressed.    CORONARY ARTERY CALCIFICATION: Cannot evaluate. Motion-related blurring precludes assessment of coronary calcification.    UPPER ABDOMEN: Prior cholecystectomy.    MUSCULOSKELETAL: Small diffuse thoracic spine degenerative osteophytes. No aggressive or destructive bone lesions.      Impression    IMPRESSION:     1.  Severely enlarged main pulmonary artery which is associated with pulmonary hypertension. No findings to suggest acute or chronic pulmonary embolism.  2.  Dilated right ventricle, likely secondary to #1.  3.  No acute lung parenchymal or pleural space process.     Head CT w/o contrast    Narrative    EXAM: CT HEAD W/O  CONTRAST  LOCATION: [facility]  DATE: 2/11/2025    INDICATION: Weakness, fall.  COMPARISON: CT of the head dated 2/9/2022.  TECHNIQUE: Routine CT Head without IV contrast. Multiplanar reformats. Dose reduction techniques were used.    FINDINGS:  INTRACRANIAL CONTENTS: No intracranial hemorrhage, extraaxial collection, or mass effect.  There are a few small nonspecific hypoattenuating lesions in the right cerebellar hemisphere (series 4 image 9), not definitively present on the previous exam.   These raise concern for age-indeterminate infarcts, possibly chronic. Brain parenchymal attenuation otherwise appears within normal limits for age. Mild generalized volume loss. No hydrocephalus.     VISUALIZED ORBITS/SINUSES/MASTOIDS: No intraorbital abnormality. No paranasal sinus mucosal disease. No middle ear or mastoid effusion.    BONES/SOFT TISSUES: There is a presumed focal hematoma overlying the right occipital calvarium involving the occipital/suboccipital scalp with mild surrounding subcutaneous fat stranding, likely representing contusion given the patient's history of   fall/trauma. No underlying displaced calvarial fracture identified.      Impression    IMPRESSION:  1.  No acute intracranial hemorrhage, mass effect, or hydrocephalus.  2.  A few small nonspecific hypoattenuating lesions in the right cerebellar hemisphere, not definitively present on the previous exam. These raise concern for age-indeterminate infarcts, possibly chronic.  3.  Right occipital/suboccipital scalp contusion without underlying displaced calvarial fracture.     US Abdomen Limited    Narrative    EXAM: US ABDOMEN LIMITED  LOCATION: [facility]  DATE: 2/11/2025    INDICATION: abnormal lfts  COMPARISON: CT 7/18/2024  TECHNIQUE: Limited abdominal ultrasound.    FINDINGS:  Technically difficult examination due to patient body habitus.    GALLBLADDER: Surgically removed.    BILE DUCTS: No  biliary dilatation. The common duct measures 4 mm.    LIVER: Normal parenchyma with smooth contour. No focal mass. The portal vein is patent with flow in the normal direction.    RIGHT KIDNEY: No hydronephrosis.    PANCREAS: The visualized portions are normal.    No ascites.      Impression    IMPRESSION:  No acute findings or explanation for elevated liver function tests.       MR Brain w/o & w Contrast    Narrative    MR BRAIN W/O and W CONTRAST  [facility]  2/11/2025 8:15 PM CST    INDICATION: Multiple sclerosis.  TECHNIQUE: Noncontrast and contrast enhanced MRI of the brain.  CONTRAST: 15 ml Gadavist  COMPARISON: Head CT 2/11/2025    FINDINGS: There is a punctate focus of restricted diffusion within the posterior paramedian right frontal white matter consistent with a tiny recent acute or early subacute infarct. There are several small infarcts within the bilateral cerebellar   hemispheres. Most of these have a chronic appearance. A single linear infarct in the inferolateral right cerebellum demonstrates some subtle diffusion hyperintensity and may be more recent. Paranasal sinuses are free from significant disease. Mastoid air   cells appear free from significant disease. Intraorbital contents are unremarkable. Ventricles are within normal limits in size for the patient's age. Intracranial flow voids are intact. There is no mass effect, midline shift, or extra-axial collection.   Mild periventricular T2 hyperintensity most consistent with chronic small vessel ischemic related gliosis. No evidence for acute or chronic intracranial blood products.      Impression    IMPRESSION:   1.  There is a punctate focus of acute or early subacute infarct within the posterior paramedian right frontal white matter.  2.  There are several small chronic infarcts within the cerebellar hemispheres. A single linear infarct in the inferolateral right cerebellum may be more recent.  3.  T2 hyperintensities within  the white matter probably reflect a combination of chronic demyelinating plaques given the provided history of multiple sclerosis, and chronic small vessel ischemic changes. No abnormal enhancement to indicate active   demyelination in the brain.         US Carotid Bilateral    Narrative    EXAM: US CAROTID BILATERAL  LOCATION: [facility]  DATE: 2/12/2025    INDICATION: CVA, carotid bruit.  COMPARISON: None.  TECHNIQUE: Duplex exam performed utilizing 2D gray-scale imaging, Doppler interrogation with color-flow and spectral waveform analysis. The percent diameter stenosis is determined using Updated Recommendations for Carotid Stenosis Interpretation Criteria   from [employer] [facility].    FINDINGS:    RIGHT: Mild plaque at the bifurcation. The peak systolic velocity in the ICA is less than 180 cm/sec, consistent with less than 50% stenosis. Normal velocities in the ECA. Antegrade flow within the vertebral artery.     LEFT: Mild plaque at the bifurcation. The peak systolic velocity in the ICA is less than 180 cm/sec, consistent with less than 50% stenosis. Normal velocities in the ECA. Antegrade flow within the vertebral artery.    VELOCITY CHART:  CCA   Right: 104/16 cm/s   Left: 106/13 cm/s  ICA   Right: 51/16 cm/s   Left: 69/21 cm/s  ECA   Right: 83/14 cm/s   Left: 79/8 cm/s  ICA/CCA PSV Ratio   Right: 0.95   Left: 0.7      Impression    IMPRESSION:  1.  Mild plaque formation, velocities consistent with less than 50% stenosis in the right internal carotid artery.  2.  Mild plaque formation, velocities consistent with less than 50% stenosis in the left internal carotid artery.  3.  Flow within the vertebral arteries is antegrade.   US Lower Extremity Venous Duplex Bilateral    Narrative    EXAM: US LOWER EXTREMITY VENOUS DUPLEX BILATERAL  LOCATION: [facility]  DATE: 2/12/2025    INDICATION: severe edema, make sure no DVT's  COMPARISON: None.  TECHNIQUE: Venous  Duplex ultrasound of bilateral lower extremities with and without compression, augmentation and duplex. Color flow and spectral Doppler with waveform analysis performed.    FINDINGS: Exam includes the common femoral, femoral, popliteal veins as well as segmentally visualized deep calf veins and greater saphenous vein. Exam limited by body habitus and skin thickening    RIGHT: No deep vein thrombosis. No superficial thrombophlebitis. No popliteal cyst.    LEFT: No deep vein thrombosis. No superficial thrombophlebitis. No popliteal cyst.      Impression    IMPRESSION:  1.  No deep venous thrombosis in the bilateral lower extremities.   MR Cervical Spine w/o & w Contrast    Narrative    EXAM: MR CERVICAL SPINE W/O and W CONTRAST  LOCATION: [facility]  DATE: 2/13/2025    INDICATION: leg weakness, h o MS, eval for active MS lesion  COMPARISON: None.  CONTRAST: 15 mL Gadavist  TECHNIQUE: MRI Cervical Spine without and with IV contrast.    FINDINGS:   Motion artifact limits aspects of exam.    Cervical spinal cord demonstrates patchy multifocal small foci of increased T2/STIR signal and/or artifact. Findings are nonspecific although compatible with demyelination although limited in evaluation. No cord enhancement to suggest active   demyelination.    Bilateral paraspinal muscles demonstrate patchy increased STIR signal and enhancement may reflect inflammatory process or denervation.    Normal vertebral body heights.  No concerning bone marrow lesions.   No significant subluxations.    Mild multilevel degenerative disc disease worse at C5-6. C5-6 disc height loss with mild degenerative type I and type II Modic changes. Mild multilevel uncovertebral and facet arthropathy.    Craniocervical junction: No significant thecal sac stenosis.    C2-C3: No significant bulge or posterior disc protrusion. Mild uncovertebral facet arthropathy. No significant thecal sac stenosis. No significant neural foraminal  stenosis.    C3-C4: No significant bulge or posterior disc protrusion. Uncovertebral facet arthropathy. No significant thecal sac stenosis. Mild to moderate bilateral foraminal stenosis.    C4-C5: Mild bulge. Small superimposed posterior disc extrusion extending mildly above and below the disc margin. Uncovertebral facet arthropathy. Mild thecal sac stenosis. Mild to moderate left foraminal stenosis. Severe right foraminal stenosis.    C5-C6: Bulge. Superimposed posterior disc extrusion extending mildly above and below the disc margin. Uncovertebral facet arthropathy. Mild to moderate thecal sac stenosis. Severe left foraminal stenosis. Mild-to-moderate right foraminal stenosis.    C6-C7: No significant bulge or posterior disc protrusion. Uncovertebral facet arthropathy. No significant thecal sac stenosis. No significant neural foraminal stenosis.    C7-T1: No significant bulge or posterior disc protrusion. Facet arthropathy. No significant thecal sac stenosis. No significant neural foraminal stenosis.    EXTRASPINAL FINDINGS:  Partially visualized bilateral cerebellum demonstrates several small chronic infarcts. Left inferior neck level 4 lymphadenopathy.      Impression     IMPRESSION:  1.  Cervical spinal cord demonstrates patchy multifocal small foci of increased T2/STIR signal and/or artifact. Findings are nonspecific although compatible with demyelination although limited in evaluation.     2.  No cord enhancement to suggest active demyelination.    3.  Bilateral paraspinal muscles demonstrate patchy increased STIR signal and enhancement may reflect inflammatory process or denervation.    4.  Multilevel spondylosis described above.    5.  No critical thecal sac stenosis.    6.  C4-C5: Severe right foraminal stenosis.    7.  C5-C6: Severe left foraminal stenosis.     8.  Left inferior neck level 4 lymphadenopathy.               MR Thoracic Spine w/o & w Contrast    Narrative    EXAM: MR THORACIC SPINE W/O and W  CONTRAST  LOCATION: [facility]  DATE: 2025    INDICATION: leg weakness, h o MS, eval for active MS lesion  COMPARISON: None.  CONTRAST: 15 mL Gadavist  TECHNIQUE: Routine Thoracic Spine MRI without and with IV contrast.    FINDINGS:   Thoracic spinal cord demonstrates patchy multifocal small foci of increased T2/STIR signal and/or artifact. Findings are nonspecific although compatible with demyelination although limited in evaluation. No cord enhancement to suggest active   demyelination.    Normal vertebral body heights.  No concerning bone marrow lesions.   No significant subluxations.    Mild multilevel degenerative disc disease. T11-T12 mild degenerative type I and type II Modic changes. Multilevel facet arthropathy. Mild posterior epidural lipomatosis. Lower thoracic spine demonstrates several mild bulges.     No significant thecal sac stenosis.    Various levels and degrees of neural foraminal stenosis. No severe high-grade neural foraminal stenosis.    OTHER:  Left inferior neck level 4 lymphadenopathy.        Impression     IMPRESSION:  1.  Thoracic spinal cord demonstrates patchy multifocal small foci of increased T2/STIR signal and/or artifact. Findings are nonspecific although compatible with demyelination although limited in evaluation.     2.  No cord enhancement to suggest active demyelination.    3.  Multilevel spondylosis described above.    4.  No significant thecal sac stenosis.    5.  Left inferior neck level 4 lymphadenopathy.     Echocardiogram Complete    Narrative    [id number]  [id number]  [id number]  [id number] [address]     Name: CITLALY MICHEL  MRN: [id number]  : 1975  Study Date: 2025 10:25 AM  Age: 49 yrs  Gender: Female  Patient [facility]  Reason For Study: CHF  Ordering Physician: NINA SORIA  Performed By: REBECCA     BSA: 2.4 m2  Height: 64 in  Weight: 330 lb  HR:  98  BP: 111/66 mmHg  ______________________________________________________________________________  Procedure  Echocardiogram with two-dimensional, color and spectral Doppler. Definity (NDC  #[id number]) given intravenously.  ______________________________________________________________________________  Interpretation Summary     1. Technically difficult study.  2. The left ventricle is normal in size.Image quality does not provide for  detailed assessment of LV systolic function, but is felt to be normal with a  visually estimated ejection fraction of roughly 65%.  3. There is abnormal septal motion c/w right ventricular overload.  4. Probable mild-moderate tricuspid insufficiency (the color Doppler tricuspid  insufficiency jet is poorly visualized).  5. There is severe right ventricular enlargement with severely reduced right  ventricular systolic performance.  6. The left atrium appears enlarged though difficult to quantify due to  inadequate visualization. There is severe right atrial enlargement.  7. Right ventricular systolic pressure relative to right atrial pressure is  moderate-severely increased. The pulmonary artery pressure is estimated to be  70-75 mmHg plus right atrial pressure. In addition, the IVC appears dilated  with decreased phasic variation in caval diameter consistent with elevated  right atrial pressure.     When compared to the prior real-time echocardiogram dated 19 July 2024, the  pulmonary artery pressure estimate measures slightly less on the current  study. The findings are otherwise felt to be fairly similar on both  examinations (although the right ventricular size was previously described as  only mildly enlarged, it is this reader's impression that right ventricular  size was severely increased on the prior study with concomitant severe  depression and right ventricular systolic performance).  ______________________________________________________________________________  Left  ventricle:  The left ventricle is normal in size.Image quality does not provide for  detailed assessment of LV systolic function, but is felt to be normal with a  visually estimated ejection fraction of [id number]%. There is abnormal septal  motion c/w right ventricular overload. Left ventricular wall thickness is  normal.     Assessment of LV Diastolic Function: The cumulative findings are indeterminate  in the evaluation of diastolic function.     Right ventricle:  There is severe right ventricular enlargement with severely reduced right  ventricular systolic performance.     Left atrium:  The left atrium appears enlarged though difficult to quantify due to  inadequate visualization.     Right atrium:  There is severe right atrial enlargement.     IVC:  The IVC appears dilated with decreased phasic variation in caval diameter  consistent with elevated right atrial pressure.     Aortic valve:  The aortic valve is not well visualized, but suspected to be comprised of  three cusps. No significant aortic stenosis or aortic insufficiency is  detected on this study.     Mitral valve:  There is moderate mitral annular calcification. No mitral insufficiency of  significance is appreciated.     Tricuspid valve:  The tricuspid valve is not well-visualized. Probable mild-moderate tricuspid  insufficiency (the color Doppler tricuspid insufficiency jet is poorly  visualized).     Pulmonic valve:  The pulmonic valve is not well-visualized.     Thoracic aorta:  There is borderline enlargement of the proximal ascending aorta.     Pericardium:  There is no significant pericardial effusion.  ______________________________________________________________________________  ______________________________________________________________________________  MMode/2D Measurements & Calculations  Ao root diam: 3.4 cm  asc Aorta Diam: 3.8 cm  LVOT diam: 2.3 cm  LVOT area: 4.1 cm2  Ao root diam index Ht(cm/m): 2.1  Ao root diam index BSA  (cm/m2): 1.4  Asc Ao diam index BSA (cm/m2): 1.6  Asc Ao diam index Ht(cm/m): 2.3     Time Measurements  MM HR: 98.0 BPM     Doppler Measurements & Calculations  MV E max jim: 65.6 cm/sec  MV A max jim: 131.9 cm/sec  MV E/A: 0.50  MV max P.6 mmHg  MV mean PG: 3.7 mmHg  MV V2 VTI: 20.3 cm  MVA(VTI): 3.7 cm2     MV dec slope: 1165 cm/sec2  MV dec time: 0.06 sec  Ao V2 max: 137.7 cm/sec  Ao max P.0 mmHg  Ao V2 mean: 90.7 cm/sec  Ao mean PG: 3.8 mmHg  Ao V2 VTI: 19.4 cm  SHANELL(I,D): 3.8 cm2  SHANELL(V,D): 2.5 cm2  LV V1 max P.7 mmHg  LV V1 max: 82.4 cm/sec  LV V1 VTI: 18.0 cm  SV(LVOT): 74.5 ml  SI(LVOT): 30.8 ml/m2  TR max jim: 429.4 cm/sec  TR max P.7 mmHg  AV Jim Ratio (DI): 0.60  SHANELL Index (cm2/m2): 1.6     ______________________________________________________________________________  Report approved by: Otoniel Nath MD on 2025 01:41 PM           *Note: Due to a large number of results and/or encounters for the requested time period, some results have not been displayed. A complete set of results can be found in Results Review.

## 2025-02-19 ENCOUNTER — TRANSITIONAL CARE UNIT VISIT (OUTPATIENT)
Dept: GERIATRICS | Facility: CLINIC | Age: 50
End: 2025-02-19
Payer: COMMERCIAL

## 2025-02-19 ENCOUNTER — DOCUMENTATION ONLY (OUTPATIENT)
Dept: GERIATRICS | Facility: CLINIC | Age: 50
End: 2025-02-19

## 2025-02-19 ENCOUNTER — PATIENT OUTREACH (OUTPATIENT)
Dept: CARE COORDINATION | Facility: CLINIC | Age: 50
End: 2025-02-19

## 2025-02-19 VITALS
RESPIRATION RATE: 18 BRPM | OXYGEN SATURATION: 91 % | HEART RATE: 89 BPM | HEIGHT: 64 IN | SYSTOLIC BLOOD PRESSURE: 128 MMHG | TEMPERATURE: 97.6 F | BODY MASS INDEX: 46.95 KG/M2 | WEIGHT: 275 LBS | DIASTOLIC BLOOD PRESSURE: 77 MMHG

## 2025-02-19 DIAGNOSIS — E66.01 MORBID OBESITY (H): ICD-10-CM

## 2025-02-19 DIAGNOSIS — G35 MS (MULTIPLE SCLEROSIS) (H): ICD-10-CM

## 2025-02-19 DIAGNOSIS — I27.20 PULMONARY HYPERTENSION (H): ICD-10-CM
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
## 2025-02-19 DIAGNOSIS — I50.32 CHRONIC DIASTOLIC HEART FAILURE (H): ICD-10-CM

## 2025-02-19 DIAGNOSIS — R60.0 LEG EDEMA: ICD-10-CM

## 2025-02-19 DIAGNOSIS — M62.81 GENERALIZED MUSCLE WEAKNESS: ICD-10-CM

## 2025-02-19 DIAGNOSIS — G47.33 OSA ON CPAP: ICD-10-CM

## 2025-02-19 DIAGNOSIS — J96.21 ACUTE AND CHRONIC RESPIRATORY FAILURE WITH HYPOXIA (H): ICD-10-CM

## 2025-02-19 DIAGNOSIS — K59.01 SLOW TRANSIT CONSTIPATION: ICD-10-CM

## 2025-02-19 DIAGNOSIS — G35 MULTIPLE SCLEROSIS (H): Primary | Chronic | ICD-10-CM

## 2025-02-19 DIAGNOSIS — E66.2 OBESITY HYPOVENTILATION SYNDROME (H): ICD-10-CM

## 2025-02-19 PROCEDURE — 99310 SBSQ NF CARE HIGH MDM 45: CPT

## 2025-02-19 RX ORDER — POLYETHYLENE GLYCOL 3350 17 G/17G
17 POWDER, FOR SOLUTION ORAL DAILY
COMMUNITY

## 2025-02-19 ASSESSMENT — ACTIVITIES OF DAILY LIVING (ADL): DEPENDENT_IADLS:: CLEANING;LAUNDRY;TRANSPORTATION

## 2025-02-19 NOTE — PROGRESS NOTES
"[facility] GERIATRICS    PRIMARY CARE PROVIDER AND CLINIC:  Mikala Luna MD, [address] [facility] [address]  Chief Complaint   Patient presents with    Hospital F/U      [facility] Medical Record Number:  [id number]  Place of Service where encounter took place:  [facility] ([facility]) [[id number]]    Bess Enamorado  is a 49 year old  (1975), admitted to the above facility from  [facility]. Hospital stay 2/11/25 through 2/18/25. PMHx includes MS, hypertension, diastolic CHF, chronic hypoxic resp failure, NARCISA.     HPI  Brief Hospital Course Summary:   Presented to the ED for evaluation post fall. During her work up she had a MRI where she was found to have a \"small punctate focus of restricted diffusion within the posterior paramedian right frontal white matter consistent with a tiny recent acute or early subacute infarct.\" She was discharged to TCU for rehabilitation.    Today she reports no new pain outside of the norm. She is sitting up comfortably. Supplemental oxygen at baseline (3 L). States that she usually wears her CPAP at night but did not get it on last night. Her biggest issue is needing the bathroom due to her water pills. States she needs to access the bathroom urgently. She has a commode which has made this now easier. She has not yet had a BM. Requests and increase in her bowel regimen. Sleeping okay. Appetite stable. Mood stable. Denies urinary symptoms including dysuria or hematuria. Denies shortness of breath, chest pain, palpitations, dizziness, lightheadedness, dizziness.      CODE STATUS/ADVANCE DIRECTIVES DISCUSSION:  Prior  CPR/Full code   ALLERGIES: No Known Allergies   PAST MEDICAL HISTORY:   Past Medical History:   Diagnosis Date    Acute on chronic diastolic congestive heart failure (H) 02/09/2022    Arthritis 2019    Hips    ASCUS favor benign 08/2015    Neg HPV    Depressive disorder 2010    H/O colposcopy with cervical biopsy " 09/14/2015    Bx & ECC - negative    Hypertension 2019    LSIL on Pap smear 07/2014    Neg high risk HPV    Multiple sclerosis (H) 08/29/2005 9/18/200pt dx with MS 2004--dx by neurolgist and is followed by Dr. Harris    Myocardial infarction (H)     Obese     Pneumonia due to infectious organism, unspecified laterality, unspecified part of lung 02/08/2022    Sepsis (Temp 101, , WBC 13.0) 10/23/2018    Shingles 10/2016    SIRS (systemic inflammatory response syndrome) (H) 03/04/2021    Sleep apnea     UNSPEC CONSTIPATION 09/18/2006 9/18/2006   Has tried otc suppository and otc lax.       PAST SURGICAL HISTORY:   has a past surgical history that includes cholecystectomy, laporoscopic; Open reduction internal fixation toe(s) (4/13/2012); colonoscopy (2007?); PICC/Midline Placement (2/21/2022); Combined cystoscopy, retrogrades, exchange stent ureter(s) (Right, 2/21/2022); and PICC/Midline Placement (3/20/2024).  FAMILY HISTORY: family history includes Anxiety Disorder in her maternal grandmother and sister; Cancer in her maternal grandfather, mother, and paternal grandmother; Diabetes in her father; Gynecology in her maternal aunt; Heart Disease in her father; Hypertension in her maternal grandmother; Melanoma in her father; Neurologic Disorder in her father; Other Cancer in her father, maternal grandfather, mother, and paternal grandmother; Sleep Apnea in her father; Thyroid Disease in her maternal grandmother and mother.  SOCIAL HISTORY:   reports that she has never smoked. She has never used smokeless tobacco. She reports current alcohol use. She reports that she does not use drugs.  Patient's living condition: lives alone    Current Outpatient Medications   Medication Sig Dispense Refill    acetaminophen (TYLENOL) 325 MG tablet Take 2 tablets (650 mg) by mouth every 4 hours as needed for mild pain or other (and adjunct with moderate or severe pain or per patient request). 20 tablet 0    aspirin (ASPIRIN  "LOW DOSE) 81 MG EC tablet Take 1 tablet (81 mg) by mouth daily. 90 tablet 1    bumetanide (BUMEX) 2 MG tablet Take 3 tablets (6 mg) by mouth daily. 360 tablet 1    clopidogrel (PLAVIX) 75 MG tablet Take 1 tablet (75 mg) by mouth daily for 16 days. 16 tablet 0    Emollient (GOLD BOND MEDICATED BODY EX) Externally apply 1 Application topically 2 times daily as needed      empagliflozin (JARDIANCE) 10 MG TABS tablet Take 1 tablet (10 mg) by mouth daily. 90 tablet 1    escitalopram (LEXAPRO) 10 MG tablet Take 1 tablet (10 mg) by mouth daily. 90 tablet 1    miconazole (MICATIN) 2 % external powder Apply topically 2 times daily for 4 days. Under breasts skin area 43 g 0    polyethylene glycol (MIRALAX) 17 g packet Take 17 g by mouth daily.      senna-docusate (SENOKOT-S/PERICOLACE) 8.6-50 MG tablet Take 1 tablet by mouth 2 times daily as needed for constipation. (Patient taking differently: Take 1 tablet by mouth 2 times daily.) 10 tablet 0    simvastatin (ZOCOR) 10 MG tablet Take 1 tablet (10 mg) by mouth at bedtime. 30 tablet 1    spironolactone (ALDACTONE) 25 MG tablet Take 0.5 tablets (12.5 mg) by mouth daily. 30 tablet 0    potassium chloride sheila ER (KLOR-CON M10) 10 MEQ CR tablet Take 3 tablets (30 mEq) by mouth daily. 90 tablet 1    semaglutide-weight management (WEGOVY) 0.25 MG/0.5ML pen Inject 0.5 mLs (0.25 mg) subcutaneously once a week. On Wednesdays       No current facility-administered medications for this visit.      ROS:  Patient denies fever, chills, headache, lightheadedness, dizziness, rhinorrhea, cough, congestion, shortness of breath, chest pain, palpitations, abdominal pain, n/v, diarrhea, change in appetite, change in sleep pattern, dysuria, frequency, burning or pain with urination.  Other than stated in HPI all other review of systems is negative.      Vital signs:/77   Pulse 89   Temp 97.6  F (36.4  C)   Resp 18   Ht 1.626 m (5' 4\")   Wt 124.7 kg (275 lb)   SpO2 (!) 91%   BMI 47.20 " kg/m     GENERAL APPEARANCE: Well developed, well nourished, in no acute distress.  LUNGS: Limited exam due to body habitus. Diminished lung sounds.  Lung sounds CTA, no adventitious sounds, respiratory effort normal.  CARD: RRR, S1, S2, without murmurs, gallops, rubs, no JVD, peripheral pulses 2+ and symmetric  ABD: Soft, nondistended and nontender with normal bowel sounds.   MSK: Muscle strength and tone were equal bilaterally. Moves all extremities easily and intentionally.   EXTREMITIES: No cyanosis, clubbing. +3 bilateral lower extremity edema.   NEURO: Alert and oriented x 3. Normal affect. Sensation to   PSYCH: calm and cooperative     Lab/Diagnostic data:  Labs reviewed in EPIC by me including CBC and BMP    ASSESSMENT/PLAN:  Fall  Possible acute to subacute CVA  Generalized Weakness  Multiple sclerosis with ongoing leg weakness  Fall likely secondary to MS and/or CVA  Continue PT/OT  Continue Plavix x 3 weeks and statin started in hospital  Continue ASA  MRI demonstrates no new active lesions- not on MS meds    Lower extremity edema  Diastolic CHF  ECHO EF 65 %  Continue spironolactone 12.5 mg daily, Jardiance 10 mg daily, Bumex 6 mg daily   OT wrapping legs, in house wound MD following     NARCISA  Chronic hypoxia  Pulmonary HTN  Obesity hypoventilation syndrome  Continue supplemental oxygen (3 liters)  Continue home settings CPAP  Follows with Pulmonology Dr. Chaudhry  Follow up with PCP for outpatient sleep study    Morbid obesity  BMI 45-49.9  Recently started Wegovy, holding while in TCU- PCP to resume upon discharge from TCU  Weight today 275.2 lbs    Constipation  No BM in 14 days   Schedule senna-s BID  PRN miralax once daily  PRN daily dulcolax suppository if no BM by 2/21/25    Orders:  Schedule senna-s BID  PRN miralax once daily  PRN daily dulcolax suppository if no BM by 2/21/25    >45 minutes spent assessing patient, providing education to patient, reviewing records from recent hospitalization at   [facility], collaborating with nursing, developing plan of care.     Electronically signed by:  JULIETA Sparks CNP

## 2025-02-19 NOTE — LETTER
" 2/19/2025      Bess Enamorado  [address] [facility] [address]        [facility] GERIATRICS    PRIMARY CARE PROVIDER AND CLINIC:  Mikala Luna MD, [address] [facility] [address]  Chief Complaint   Patient presents with     Hospital F/U      [place] Medical Record Number:  [id number]  Place of Service where encounter took place:  [facility] ([facility]) [[id number]]    Bess Enamorado  is a 49 year old  (1975), admitted to the above facility from  [facility]. Hospital stay 2/11/25 through 2/18/25. PMHx includes MS, hypertension, diastolic CHF, chronic hypoxic resp failure, NARCISA.     HPI  Brief Hospital Course Summary:   Presented to the ED for evaluation post fall. During her work up she had a MRI where she was found to have a \"small punctate focus of restricted diffusion within the posterior paramedian right frontal white matter consistent with a tiny recent acute or early subacute infarct.\" She was discharged to TCU for rehabilitation.    Today she reports no new pain outside of the norm. She is sitting up comfortably. Supplemental oxygen at baseline (3 L). States that she usually wears her CPAP at night but did not get it on last night. Her biggest issue is needing the bathroom due to her water pills. States she needs to access the bathroom urgently. She has a commode which has made this now easier. She has not yet had a BM. Requests and increase in her bowel regimen. Sleeping okay. Appetite stable. Mood stable. Denies urinary symptoms including dysuria or hematuria. Denies shortness of breath, chest pain, palpitations, dizziness, lightheadedness, dizziness.      CODE STATUS/ADVANCE DIRECTIVES DISCUSSION:  Prior  CPR/Full code   ALLERGIES: No Known Allergies   PAST MEDICAL HISTORY:   Past Medical History:   Diagnosis Date     Acute on chronic diastolic congestive heart failure (H) 02/09/2022     Arthritis 2019    Hips     ASCUS favor benign 08/2015    " Neg HPV     Depressive disorder 2010     H/O colposcopy with cervical biopsy 09/14/2015    Bx & ECC - negative     Hypertension 2019     LSIL on Pap smear 07/2014    Neg high risk HPV     Multiple sclerosis (H) 08/29/2005 9/18/200pt dx with MS 2004--dx by neurolgist and is followed by Dr. Harris     Myocardial infarction (H)      Obese      Pneumonia due to infectious organism, unspecified laterality, unspecified part of lung 02/08/2022     Sepsis (Temp 101, , WBC 13.0) 10/23/2018     Shingles 10/2016     SIRS (systemic inflammatory response syndrome) (H) 03/04/2021     Sleep apnea      UNSPEC CONSTIPATION 09/18/2006 9/18/2006   Has tried otc suppository and otc lax.       PAST SURGICAL HISTORY:   has a past surgical history that includes cholecystectomy, laporoscopic; Open reduction internal fixation toe(s) (4/13/2012); colonoscopy (2007?); PICC/Midline Placement (2/21/2022); Combined cystoscopy, retrogrades, exchange stent ureter(s) (Right, 2/21/2022); and PICC/Midline Placement (3/20/2024).  FAMILY HISTORY: family history includes Anxiety Disorder in her maternal grandmother and sister; Cancer in her maternal grandfather, mother, and paternal grandmother; Diabetes in her father; Gynecology in her maternal aunt; Heart Disease in her father; Hypertension in her maternal grandmother; Melanoma in her father; Neurologic Disorder in her father; Other Cancer in her father, maternal grandfather, mother, and paternal grandmother; Sleep Apnea in her father; Thyroid Disease in her maternal grandmother and mother.  SOCIAL HISTORY:   reports that she has never smoked. She has never used smokeless tobacco. She reports current alcohol use. She reports that she does not use drugs.  Patient's living condition: lives alone    Current Outpatient Medications   Medication Sig Dispense Refill     acetaminophen (TYLENOL) 325 MG tablet Take 2 tablets (650 mg) by mouth every 4 hours as needed for mild pain or other (and  adjunct with moderate or severe pain or per patient request). 20 tablet 0     aspirin (ASPIRIN LOW DOSE) 81 MG EC tablet Take 1 tablet (81 mg) by mouth daily. 90 tablet 1     bumetanide (BUMEX) 2 MG tablet Take 3 tablets (6 mg) by mouth daily. 360 tablet 1     clopidogrel (PLAVIX) 75 MG tablet Take 1 tablet (75 mg) by mouth daily for 16 days. 16 tablet 0     Emollient (GOLD BOND MEDICATED BODY EX) Externally apply 1 Application topically 2 times daily as needed       empagliflozin (JARDIANCE) 10 MG TABS tablet Take 1 tablet (10 mg) by mouth daily. 90 tablet 1     escitalopram (LEXAPRO) 10 MG tablet Take 1 tablet (10 mg) by mouth daily. 90 tablet 1     miconazole (MICATIN) 2 % external powder Apply topically 2 times daily for 4 days. Under breasts skin area 43 g 0     polyethylene glycol (MIRALAX) 17 g packet Take 17 g by mouth daily.       senna-docusate (SENOKOT-S/PERICOLACE) 8.6-50 MG tablet Take 1 tablet by mouth 2 times daily as needed for constipation. (Patient taking differently: Take 1 tablet by mouth 2 times daily.) 10 tablet 0     simvastatin (ZOCOR) 10 MG tablet Take 1 tablet (10 mg) by mouth at bedtime. 30 tablet 1     spironolactone (ALDACTONE) 25 MG tablet Take 0.5 tablets (12.5 mg) by mouth daily. 30 tablet 0     potassium chloride sheila ER (KLOR-CON M10) 10 MEQ CR tablet Take 3 tablets (30 mEq) by mouth daily. 90 tablet 1     semaglutide-weight management (WEGOVY) 0.25 MG/0.5ML pen Inject 0.5 mLs (0.25 mg) subcutaneously once a week. On Wednesdays       No current facility-administered medications for this visit.      ROS:  Patient denies fever, chills, headache, lightheadedness, dizziness, rhinorrhea, cough, congestion, shortness of breath, chest pain, palpitations, abdominal pain, n/v, diarrhea, change in appetite, change in sleep pattern, dysuria, frequency, burning or pain with urination.  Other than stated in HPI all other review of systems is negative.      Vital signs:/77   Pulse 89    "Temp 97.6  F (36.4  C)   Resp 18   Ht 1.626 m (5' 4\")   Wt 124.7 kg (275 lb)   SpO2 (!) 91%   BMI 47.20 kg/m     GENERAL APPEARANCE: Well developed, well nourished, in no acute distress.  LUNGS: Limited exam due to body habitus. Diminished lung sounds.  Lung sounds CTA, no adventitious sounds, respiratory effort normal.  CARD: RRR, S1, S2, without murmurs, gallops, rubs, no JVD, peripheral pulses 2+ and symmetric  ABD: Soft, nondistended and nontender with normal bowel sounds.   MSK: Muscle strength and tone were equal bilaterally. Moves all extremities easily and intentionally.   EXTREMITIES: No cyanosis, clubbing. +3 bilateral lower extremity edema.   NEURO: Alert and oriented x 3. Normal affect. Sensation to   PSYCH: calm and cooperative     Lab/Diagnostic data:  Labs reviewed in EPIC by me including CBC and BMP    ASSESSMENT/PLAN:  Fall  Possible acute to subacute CVA  Generalized Weakness  Multiple sclerosis with ongoing leg weakness  Fall likely secondary to MS and/or CVA  Continue PT/OT  Continue Plavix x 3 weeks and statin started in hospital  Continue ASA  MRI demonstrates no new active lesions- not on MS meds    Lower extremity edema  Diastolic CHF  ECHO EF 65 %  Continue spironolactone 12.5 mg daily, Jardiance 10 mg daily, Bumex 6 mg daily   OT wrapping legs, in house wound MD following     NARCISA  Chronic hypoxia  Pulmonary HTN  Obesity hypoventilation syndrome  Continue supplemental oxygen (3 liters)  Continue home settings CPAP  Follows with Pulmonology Dr. Chaudhry  Follow up with PCP for outpatient sleep study    Morbid obesity  BMI 45-49.9  Recently started Wegovy, holding while in TCU- PCP to resume upon discharge from TCU  Weight today 275.2 lbs    Constipation  No BM in 14 days   Schedule senna-s BID  PRN miralax once daily  PRN daily dulcolax suppository if no BM by 2/21/25    Orders:  Schedule senna-s BID  PRN miralax once daily  PRN daily dulcolax suppository if no BM by 2/21/25    >45 minutes " spent assessing patient, providing education to patient, reviewing records from recent hospitalization at [facility], collaborating with nursing, developing plan of care.     Electronically signed by:  JULIETA Sparks CNP                    Sincerely,        JULIETA Sparks CNP    Electronically signed

## 2025-02-19 NOTE — LETTER
To:             Please give to facility    From:   Meenakshi Campbell RN, Care Coordinator   [facility]   Meenakshi Campbell RN, Care Coordinator   [facility]   E-mail [email or web address]   [phone]   Patient Name:  Bess Enamorado YOB: 1975   Admit date: 2/18/2025      *Information Needed:  Please contact me when the patient will discharge (or if they will move to long term care)- include the discharge date, disposition, and main diagnosis   If the patient is discharged with home care services, please provide the name of the agency    Also- Please inform me if a care conference is being held.   [facility]   Meenakshi Campbell RN, Care Coordinator   [facility]   E-mail [email or web address]   [phone]                             Thank you         Electronically signed

## 2025-02-19 NOTE — PROGRESS NOTES
Clinic Care Coordination Contact  Care Coordination Transition Communication    Clinical Data:     [facility]     Discharge Summary  Hospitalist     Date of Admission:  2/11/2025  Date of Discharge:  2/18/2025  Discharging Provider: Eliazar Vargas MD  Date of Service (when I saw the patient): 02/18/25        Discharge Diagnoses  Fall  Possible acute to subacute CVA  MS with weakness  Obesity related hypoventilation syndrome  NARCISA  Chronic diastolic CHF  Pulmonary hypertension with chronic hypoxia  UTI   .     Assessment: Patient has transitioned to TCU/ARU for short term rehabilitation:    Facility Name: [facility]   Transition Communication:  Notified facility of Primary Care- Care Coordination support via Epic fax.    Plan: Care Coordinator will await notification from facility staff informing of patient's discharge plans/needs. Care Coordinator will review chart and outreach to facility staff every 4 weeks and as needed.     [facility]   Meenakshi Campbell RN, Care Coordinator   [facility]   E-mail [email or web address]   [phone]

## 2025-02-19 NOTE — PLAN OF CARE
Physical Therapy Discharge Summary    Reason for therapy discharge:    Discharged to transitional care facility.    Progress towards therapy goal(s). See goals on Care Plan in [facility] electronic health record for goal details.  Goals partially met.  Barriers to achieving goals:   pt is working on the goals yet.    Therapy recommendation(s):    Continued therapy is recommended.  Rationale/Recommendations:  To improve mobility and strength. .

## 2025-02-20 LAB
ATRIAL RATE - MUSE: 91 BPM
DIASTOLIC BLOOD PRESSURE - MUSE: 82 MMHG
INTERPRETATION ECG - MUSE: NORMAL
P AXIS - MUSE: 75 DEGREES
PR INTERVAL - MUSE: 190 MS
QRS DURATION - MUSE: 98 MS
QT - MUSE: 392 MS
QTC - MUSE: 482 MS
R AXIS - MUSE: 117 DEGREES
SYSTOLIC BLOOD PRESSURE - MUSE: 120 MMHG
T AXIS - MUSE: -69 DEGREES
VENTRICULAR RATE- MUSE: 91 BPM

## 2025-02-21 LAB
ANION GAP SERPL CALCULATED.3IONS-SCNC: 13 MMOL/L (ref 7–15)
BUN SERPL-MCNC: 16.1 MG/DL (ref 6–20)
CALCIUM SERPL-MCNC: 10.8 MG/DL (ref 8.8–10.4)
CHLORIDE SERPL-SCNC: 98 MMOL/L (ref 98–107)
CREAT SERPL-MCNC: 0.66 MG/DL (ref 0.51–0.95)
EGFRCR SERPLBLD CKD-EPI 2021: >90 ML/MIN/1.73M2
ERYTHROCYTE [DISTWIDTH] IN BLOOD BY AUTOMATED COUNT: 18.4 % (ref 10–15)
GLUCOSE SERPL-MCNC: 87 MG/DL (ref 70–99)
HCO3 SERPL-SCNC: 28 MMOL/L (ref 22–29)
HCT VFR BLD AUTO: 46.9 % (ref 35–47)
HGB BLD-MCNC: 15.1 G/DL (ref 11.7–15.7)
MCH RBC QN AUTO: 28.5 PG (ref 26.5–33)
MCHC RBC AUTO-ENTMCNC: 32.2 G/DL (ref 31.5–36.5)
MCV RBC AUTO: 89 FL (ref 78–100)
PLATELET # BLD AUTO: 217 10E3/UL (ref 150–450)
POTASSIUM SERPL-SCNC: 3.7 MMOL/L (ref 3.4–5.3)
RBC # BLD AUTO: 5.29 10E6/UL (ref 3.8–5.2)
SODIUM SERPL-SCNC: 139 MMOL/L (ref 135–145)
WBC # BLD AUTO: 5.3 10E3/UL (ref 4–11)

## 2025-02-21 PROCEDURE — 85027 COMPLETE CBC AUTOMATED: CPT | Mod: ORL | Performed by: FAMILY MEDICINE

## 2025-02-21 PROCEDURE — 36415 COLL VENOUS BLD VENIPUNCTURE: CPT | Mod: ORL | Performed by: FAMILY MEDICINE

## 2025-02-21 PROCEDURE — 80048 BASIC METABOLIC PNL TOTAL CA: CPT | Mod: ORL | Performed by: FAMILY MEDICINE

## 2025-02-21 PROCEDURE — P9604 ONE-WAY ALLOW PRORATED TRIP: HCPCS | Mod: ORL | Performed by: FAMILY MEDICINE

## 2025-02-24 ENCOUNTER — TRANSITIONAL CARE UNIT VISIT (OUTPATIENT)
Dept: GERIATRICS | Facility: CLINIC | Age: 50
End: 2025-02-24
Payer: COMMERCIAL

## 2025-02-24 VITALS
HEIGHT: 64 IN | TEMPERATURE: 98 F | DIASTOLIC BLOOD PRESSURE: 82 MMHG | RESPIRATION RATE: 18 BRPM | HEART RATE: 79 BPM | OXYGEN SATURATION: 98 % | BODY MASS INDEX: 46.67 KG/M2 | WEIGHT: 273.4 LBS | SYSTOLIC BLOOD PRESSURE: 127 MMHG

## 2025-02-24 DIAGNOSIS — G35 MULTIPLE SCLEROSIS (H): ICD-10-CM

## 2025-02-24 DIAGNOSIS — S31.809D WOUND OF BUTTOCK, UNSPECIFIED LATERALITY, SUBSEQUENT ENCOUNTER: Primary | ICD-10-CM

## 2025-02-24 DIAGNOSIS — I50.32 CHRONIC DIASTOLIC HEART FAILURE (H): ICD-10-CM

## 2025-02-24 DIAGNOSIS — Z86.73 HISTORY OF CVA (CEREBROVASCULAR ACCIDENT): ICD-10-CM

## 2025-02-24 DIAGNOSIS — J96.21 ACUTE AND CHRONIC RESPIRATORY FAILURE WITH HYPOXIA (H): ICD-10-CM

## 2025-02-24 DIAGNOSIS — I27.20 PULMONARY HYPERTENSION (H): ICD-10-CM

## 2025-02-24 DIAGNOSIS — G47.33 OSA ON CPAP: ICD-10-CM
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
## 2025-02-24 DIAGNOSIS — K59.01 SLOW TRANSIT CONSTIPATION: ICD-10-CM

## 2025-02-24 DIAGNOSIS — M62.81 GENERALIZED MUSCLE WEAKNESS: ICD-10-CM

## 2025-02-24 DIAGNOSIS — E66.2 OBESITY HYPOVENTILATION SYNDROME (H): ICD-10-CM

## 2025-02-24 DIAGNOSIS — R60.0 LEG EDEMA: ICD-10-CM

## 2025-02-24 DIAGNOSIS — E66.01 MORBID OBESITY (H): ICD-10-CM

## 2025-02-24 PROCEDURE — 99309 SBSQ NF CARE MODERATE MDM 30: CPT

## 2025-02-24 NOTE — LETTER
" 2/24/2025      Bess Enamorado  [address]  [facility] [address]        [facility] GERIATRICS    PRIMARY CARE PROVIDER AND CLINIC:  Mikala Luna MD, [address] [facility] [address]  Chief Complaint   Patient presents with     RECHECK      [place] Medical Record Number:  [id number]  Place of Service where encounter took place:  [facility] ([facility]) [[id number]]    Bess Enamorado  is a 49 year old  (1975), admitted to the above facility from  [facility]. Hospital stay 2/11/25 through 2/18/25. PMHx includes MS, hypertension, diastolic CHF, chronic hypoxic resp failure, NARCISA.     HPI  Brief Hospital Course Summary:   Presented to the ED for evaluation post fall. During her work up she had a MRI where she was found to have a \"small punctate focus of restricted diffusion within the posterior paramedian right frontal white matter consistent with a tiny recent acute or early subacute infarct.\" She was discharged to TCU for rehabilitation.    Today she denies concerns. States she has developed a sore on her bottom which is being followed by the in house wound MD. She has had a sore in this same spot. Denies loose stools. Sleeping well. Appetite stable. Mood stable. Having daily bowel movements. Denies urinary symptoms including dysuria or hematuria. Denies shortness of breath, chest pain, palpitations, dizziness, lightheadedness.    CODE STATUS/ADVANCE DIRECTIVES DISCUSSION:  Prior  CPR/Full code   ALLERGIES: No Known Allergies   PAST MEDICAL HISTORY:   Past Medical History:   Diagnosis Date     Acute on chronic diastolic congestive heart failure (H) 02/09/2022     Arthritis 2019    Hips     ASCUS favor benign 08/2015    Neg HPV     Depressive disorder 2010     H/O colposcopy with cervical biopsy 09/14/2015    Bx & ECC - negative     Hypertension 2019     LSIL on Pap smear 07/2014    Neg high risk HPV     Multiple sclerosis (H) 08/29/2005 9/18/200pt dx with " MS 2004--dx by neurolgist and is followed by Dr. Harris     Myocardial infarction (H)      Obese      Pneumonia due to infectious organism, unspecified laterality, unspecified part of lung 02/08/2022     Sepsis (Temp 101, , WBC 13.0) 10/23/2018     Shingles 10/2016     SIRS (systemic inflammatory response syndrome) (H) 03/04/2021     Sleep apnea      UNSPEC CONSTIPATION 09/18/2006 9/18/2006   Has tried otc suppository and otc lax.         Current Outpatient Medications   Medication Sig Dispense Refill     acetaminophen (TYLENOL) 325 MG tablet Take 2 tablets (650 mg) by mouth every 4 hours as needed for mild pain or other (and adjunct with moderate or severe pain or per patient request). 20 tablet 0     aspirin (ASPIRIN LOW DOSE) 81 MG EC tablet Take 1 tablet (81 mg) by mouth daily. 90 tablet 1     bumetanide (BUMEX) 2 MG tablet Take 3 tablets (6 mg) by mouth daily. 360 tablet 1     clopidogrel (PLAVIX) 75 MG tablet Take 1 tablet (75 mg) by mouth daily for 16 days. 16 tablet 0     Emollient (GOLD BOND MEDICATED BODY EX) Externally apply 1 Application topically 2 times daily as needed       empagliflozin (JARDIANCE) 10 MG TABS tablet Take 1 tablet (10 mg) by mouth daily. 90 tablet 1     escitalopram (LEXAPRO) 10 MG tablet Take 1 tablet (10 mg) by mouth daily. 90 tablet 1     polyethylene glycol (MIRALAX) 17 g packet Take 17 g by mouth daily.       potassium chloride sheila ER (KLOR-CON M10) 10 MEQ CR tablet Take 3 tablets (30 mEq) by mouth daily. 90 tablet 1     senna-docusate (SENOKOT-S/PERICOLACE) 8.6-50 MG tablet Take 1 tablet by mouth 2 times daily as needed for constipation. (Patient taking differently: Take 1 tablet by mouth 2 times daily.) 10 tablet 0     simvastatin (ZOCOR) 10 MG tablet Take 1 tablet (10 mg) by mouth at bedtime. 30 tablet 1     spironolactone (ALDACTONE) 25 MG tablet Take 0.5 tablets (12.5 mg) by mouth daily. 30 tablet 0     No current facility-administered medications for this visit.  "     ROS:  Patient denies fever, chills, headache, lightheadedness, dizziness, rhinorrhea, cough, congestion, shortness of breath, chest pain, palpitations, abdominal pain, n/v, diarrhea, constipation, change in appetite, change in sleep pattern, dysuria, frequency, burning or pain with urination.  Other than stated in HPI all other review of systems is negative.      Vital signs:/82   Pulse 79   Temp 98  F (36.7  C)   Resp 18   Ht 1.626 m (5' 4\")   Wt 124 kg (273 lb 6.4 oz)   SpO2 98%   BMI 46.93 kg/m     GENERAL APPEARANCE: Well developed, well nourished, in no acute distress. Obese  LUNGS: Diminished lung sounds in all fields  CARD: RRR, S1, S2, without murmurs, gallops, rubs, no JVD, peripheral pulses 2+ and symmetric  ABD: Soft, nondistended and nontender with normal bowel sounds.   MSK: Muscle strength and tone were equal bilaterally. Moves all extremities easily and intentionally.   EXTREMITIES: No cyanosis, clubbing. Bilateral lower extremities wrapped  NEURO: Alert and oriented x 3. Normal affect. Sensation to touch was normal. Face is symmetric.  PSYCH: euthymic      Lab/Diagnostic data:  Labs reviewed in EPIC by me including CBC, BMP    ASSESSMENT/PLAN:  Fall  Possible acute to subacute CVA  Generalized Weakness  Multiple sclerosis with ongoing leg weakness  Fall likely secondary to MS and/or CVA  Continue PT/OT  Continue Plavix x 3 weeks and statin started in hospital  Continue ASA  MRI demonstrates no new active lesions- not on MS meds  No changes today    Lower extremity edema  Diastolic CHF  Buttock wound  ECHO EF 65 %  Continue spironolactone 12.5 mg daily, Jardiance 10 mg daily, Bumex 6 mg daily   OT wrapping legs, in house wound MD following   Weight stable    NARCISA  Chronic hypoxia  Pulmonary HTN  Obesity hypoventilation syndrome  Chronic, stable  Continue supplemental oxygen (3 liters)  Continue home settings CPAP  Follows with Pulmonology Dr. Chaudhry  Follow up with PCP for outpatient " sleep study    Morbid obesity  BMI 45-49.9  Recently started Wegovy, holding while in TCU- PCP to resume upon discharge from TCU  Weight today 275.1 lbs     Constipation  BM on 2/23/25  No changes to bowel regimen today  Continue scheduled miralax and senna    Orders:  NNO    Electronically signed by:  JULIETA Sparks CNP on 2/24/2025 at 1:21 PM                  Sincerely,        JULIETA Sparks CNP    Electronically signed

## 2025-02-24 NOTE — PROGRESS NOTES
"[facility] GERIATRICS    PRIMARY CARE PROVIDER AND CLINIC:  Mikala Luna MD, [address] [facility] [address]  Chief Complaint   Patient presents with    APRILECK      [facility] Medical Record Number:  [id number]  Place of Service where encounter took place:  [facility] ([facility]) [[id number]]    Bess Enamorado  is a 49 year old  (1975), admitted to the above facility from  [facility]. Hospital stay 2/11/25 through 2/18/25. PMHx includes MS, hypertension, diastolic CHF, chronic hypoxic resp failure, NARCISA.     HPI  Brief Hospital Course Summary:   Presented to the ED for evaluation post fall. During her work up she had a MRI where she was found to have a \"small punctate focus of restricted diffusion within the posterior paramedian right frontal white matter consistent with a tiny recent acute or early subacute infarct.\" She was discharged to TCU for rehabilitation.    Today she denies concerns. States she has developed a sore on her bottom which is being followed by the in house wound MD. She has had a sore in this same spot. Denies loose stools. Sleeping well. Appetite stable. Mood stable. Having daily bowel movements. Denies urinary symptoms including dysuria or hematuria. Denies shortness of breath, chest pain, palpitations, dizziness, lightheadedness.    CODE STATUS/ADVANCE DIRECTIVES DISCUSSION:  Prior  CPR/Full code   ALLERGIES: No Known Allergies   PAST MEDICAL HISTORY:   Past Medical History:   Diagnosis Date    Acute on chronic diastolic congestive heart failure (H) 02/09/2022    Arthritis 2019    Hips    ASCUS favor benign 08/2015    Neg HPV    Depressive disorder 2010    H/O colposcopy with cervical biopsy 09/14/2015    Bx & ECC - negative    Hypertension 2019    LSIL on Pap smear 07/2014    Neg high risk HPV    Multiple sclerosis (H) 08/29/2005 9/18/200pt dx with MS 2004--dx by neurolgist and is followed by Dr. Harris    Myocardial infarction (H)  "    Obese     Pneumonia due to infectious organism, unspecified laterality, unspecified part of lung 02/08/2022    Sepsis (Temp 101, , WBC 13.0) 10/23/2018    Shingles 10/2016    SIRS (systemic inflammatory response syndrome) (H) 03/04/2021    Sleep apnea     UNSPEC CONSTIPATION 09/18/2006 9/18/2006   Has tried otc suppository and otc lax.         Current Outpatient Medications   Medication Sig Dispense Refill    acetaminophen (TYLENOL) 325 MG tablet Take 2 tablets (650 mg) by mouth every 4 hours as needed for mild pain or other (and adjunct with moderate or severe pain or per patient request). 20 tablet 0    aspirin (ASPIRIN LOW DOSE) 81 MG EC tablet Take 1 tablet (81 mg) by mouth daily. 90 tablet 1    bumetanide (BUMEX) 2 MG tablet Take 3 tablets (6 mg) by mouth daily. 360 tablet 1    clopidogrel (PLAVIX) 75 MG tablet Take 1 tablet (75 mg) by mouth daily for 16 days. 16 tablet 0    Emollient (GOLD BOND MEDICATED BODY EX) Externally apply 1 Application topically 2 times daily as needed      empagliflozin (JARDIANCE) 10 MG TABS tablet Take 1 tablet (10 mg) by mouth daily. 90 tablet 1    escitalopram (LEXAPRO) 10 MG tablet Take 1 tablet (10 mg) by mouth daily. 90 tablet 1    polyethylene glycol (MIRALAX) 17 g packet Take 17 g by mouth daily.      potassium chloride sheila ER (KLOR-CON M10) 10 MEQ CR tablet Take 3 tablets (30 mEq) by mouth daily. 90 tablet 1    senna-docusate (SENOKOT-S/PERICOLACE) 8.6-50 MG tablet Take 1 tablet by mouth 2 times daily as needed for constipation. (Patient taking differently: Take 1 tablet by mouth 2 times daily.) 10 tablet 0    simvastatin (ZOCOR) 10 MG tablet Take 1 tablet (10 mg) by mouth at bedtime. 30 tablet 1    spironolactone (ALDACTONE) 25 MG tablet Take 0.5 tablets (12.5 mg) by mouth daily. 30 tablet 0     No current facility-administered medications for this visit.      ROS:  Patient denies fever, chills, headache, lightheadedness, dizziness, rhinorrhea, cough,  "congestion, shortness of breath, chest pain, palpitations, abdominal pain, n/v, diarrhea, constipation, change in appetite, change in sleep pattern, dysuria, frequency, burning or pain with urination.  Other than stated in HPI all other review of systems is negative.      Vital signs:/82   Pulse 79   Temp 98  F (36.7  C)   Resp 18   Ht 1.626 m (5' 4\")   Wt 124 kg (273 lb 6.4 oz)   SpO2 98%   BMI 46.93 kg/m     GENERAL APPEARANCE: Well developed, well nourished, in no acute distress. Obese  LUNGS: Diminished lung sounds in all fields  CARD: RRR, S1, S2, without murmurs, gallops, rubs, no JVD, peripheral pulses 2+ and symmetric  ABD: Soft, nondistended and nontender with normal bowel sounds.   MSK: Muscle strength and tone were equal bilaterally. Moves all extremities easily and intentionally.   EXTREMITIES: No cyanosis, clubbing. Bilateral lower extremities wrapped  NEURO: Alert and oriented x 3. Normal affect. Sensation to touch was normal. Face is symmetric.  PSYCH: euthymic      Lab/Diagnostic data:  Labs reviewed in EPIC by me including CBC, BMP    ASSESSMENT/PLAN:  Fall  Possible acute to subacute CVA  Generalized Weakness  Multiple sclerosis with ongoing leg weakness  Fall likely secondary to MS and/or CVA  Continue PT/OT  Continue Plavix x 3 weeks and statin started in hospital  Continue ASA  MRI demonstrates no new active lesions- not on MS meds  No changes today    Lower extremity edema  Diastolic CHF  Buttock wound  ECHO EF 65 %  Continue spironolactone 12.5 mg daily, Jardiance 10 mg daily, Bumex 6 mg daily   OT wrapping legs, in house wound MD following   Weight stable    NARCISA  Chronic hypoxia  Pulmonary HTN  Obesity hypoventilation syndrome  Chronic, stable  Continue supplemental oxygen (3 liters)  Continue home settings CPAP  Follows with Pulmonology Dr. Chaudhry  Follow up with PCP for outpatient sleep study    Morbid obesity  BMI 45-49.9  Recently started Wegovy, holding while in TCU- PCP to " resume upon discharge from TCU  Weight today 275.1 lbs     Constipation  BM on 2/23/25  No changes to bowel regimen today  Continue scheduled miralax and senna    Orders:  NNO    Electronically signed by:  JULIETA Sparks CNP on 2/24/2025 at 1:21 PM

## 2025-02-25 ENCOUNTER — DISCHARGE SUMMARY NURSING HOME (OUTPATIENT)
Dept: GERIATRICS | Facility: CLINIC | Age: 50
End: 2025-02-25
Payer: COMMERCIAL

## 2025-02-25 VITALS
TEMPERATURE: 97.4 F | SYSTOLIC BLOOD PRESSURE: 121 MMHG | OXYGEN SATURATION: 92 % | WEIGHT: 275.1 LBS | RESPIRATION RATE: 20 BRPM | HEART RATE: 92 BPM | BODY MASS INDEX: 47.22 KG/M2 | DIASTOLIC BLOOD PRESSURE: 77 MMHG

## 2025-02-25 DIAGNOSIS — S31.809D WOUND OF BUTTOCK, UNSPECIFIED LATERALITY, SUBSEQUENT ENCOUNTER: ICD-10-CM

## 2025-02-25 DIAGNOSIS — E66.2 OBESITY HYPOVENTILATION SYNDROME (H): ICD-10-CM

## 2025-02-25 DIAGNOSIS — E66.01 MORBID OBESITY (H): ICD-10-CM

## 2025-02-25 DIAGNOSIS — G35 MULTIPLE SCLEROSIS (H): ICD-10-CM

## 2025-02-25 DIAGNOSIS — Z86.73 HISTORY OF CVA (CEREBROVASCULAR ACCIDENT): Primary | ICD-10-CM

## 2025-02-25 DIAGNOSIS — I27.20 PULMONARY HYPERTENSION (H): ICD-10-CM

## 2025-02-25 DIAGNOSIS — G47.33 OSA ON CPAP: ICD-10-CM

## 2025-02-25 DIAGNOSIS — J96.21 ACUTE AND CHRONIC RESPIRATORY FAILURE WITH HYPOXIA (H): ICD-10-CM
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
## 2025-02-25 DIAGNOSIS — I50.32 CHRONIC DIASTOLIC HEART FAILURE (H): ICD-10-CM

## 2025-02-25 DIAGNOSIS — M62.81 GENERALIZED MUSCLE WEAKNESS: ICD-10-CM

## 2025-02-25 DIAGNOSIS — R60.0 LEG EDEMA: ICD-10-CM

## 2025-02-25 PROCEDURE — 99316 NF DSCHRG MGMT 30 MIN+: CPT

## 2025-02-25 NOTE — PROGRESS NOTES
[facility] GERIATRICS DISCHARGE SUMMARY  PATIENT'S NAME: Bess Enamorado  YOB: 1975  MEDICAL RECORD NUMBER:  [id number]  Place of Service where encounter took place:  General acute hospital ([facility]) [[id number]]    PRIMARY CARE PROVIDER AND CLINIC RESPONSIBLE AFTER TRANSFER:   Mikala Luna MD, [address] [facility] [address]      Transferring providers: JULIETA Sparks CNP, Shazia Le MD  Recent Hospitalization/ED:  [facility] stay 2/11/25 to 2/18/25.  Date of SNF Admission: 2/18/25  Date of [facility] (anticipated) Discharge: 2/28/25  Discharged to: previous independent home  Cognitive Scores: not completed due to no cognitive concerns  Physical Function: Independent short distances with FWW, uses w/c at home also, independ with dressing/toileting/lymph compression,   DME: No new DME needed    CODE STATUS/ADVANCE DIRECTIVES DISCUSSION:  Prior Full CODE  ALLERGIES: Patient has no known allergies.    NURSING FACILITY COURSE   Medication Changes/Rationale:   Bowel regimen changes    Summary of nursing facility stay:   She was stable throughout her stay. No concerns today. Followed by wound MD for leg and buttock wounds. Sleeping well. Appetite stable. Mood stable. Having daily bowel movements. She is requesting miralax and senna be discontinued. Denies urinary symptoms including dysuria or hematuria. Denies shortness of breath, chest pain, palpitations, dizziness, lightheadedness.     ASSESSMENT/PLAN:  Fall  Possible acute to subacute CVA  Generalized Weakness  Multiple sclerosis with ongoing leg weakness  Acute, chronic, tenuous  Fall likely secondary to MS and/or CVA  Continue PT/OT  Continue Plavix x 3 weeks and statin started in hospital  Continue ASA  MRI demonstrates no new active lesions- not on MS meds     Lower extremity edema  Diastolic CHF  Buttock wound  ECHO EF 65 %  Continue spironolactone 12.5 mg daily, Jardiance 10 mg  daily, Bumex 6 mg daily   OT wrapping legs, in house wound MD following leg and buttock wounds     NARCISA  Chronic hypoxia  Pulmonary HTN  Obesity hypoventilation syndrome  No changes today  Continue supplemental oxygen (3 liters)  Continue home settings CPAP  Follows with Pulmonology Dr. Chaudhry  Follow up with PCP for outpatient sleep study     Morbid obesity  BMI 45-49.9  Recently started Wegovy, holding while in TCU- PCP to resume upon discharge from TCU  Weight today 275.1 lbs   No changes today     Constipation  BM on 2/24/25  Discontinue miralax and senna       Current Outpatient Medications   Medication Sig Dispense Refill    acetaminophen (TYLENOL) 325 MG tablet Take 2 tablets (650 mg) by mouth every 4 hours as needed for mild pain or other (and adjunct with moderate or severe pain or per patient request). 20 tablet 0    aspirin (ASPIRIN LOW DOSE) 81 MG EC tablet Take 1 tablet (81 mg) by mouth daily. 90 tablet 1    bumetanide (BUMEX) 2 MG tablet Take 3 tablets (6 mg) by mouth daily. 360 tablet 1    clopidogrel (PLAVIX) 75 MG tablet Take 1 tablet (75 mg) by mouth daily for 16 days. 16 tablet 0    Emollient (GOLD BOND MEDICATED BODY EX) Externally apply 1 Application topically 2 times daily as needed      empagliflozin (JARDIANCE) 10 MG TABS tablet Take 1 tablet (10 mg) by mouth daily. 90 tablet 1    escitalopram (LEXAPRO) 10 MG tablet Take 1 tablet (10 mg) by mouth daily. 90 tablet 1    potassium chloride sheila ER (KLOR-CON M10) 10 MEQ CR tablet Take 3 tablets (30 mEq) by mouth daily. 90 tablet 1    simvastatin (ZOCOR) 10 MG tablet Take 1 tablet (10 mg) by mouth at bedtime. 30 tablet 1    spironolactone (ALDACTONE) 25 MG tablet Take 0.5 tablets (12.5 mg) by mouth daily. 30 tablet 0     No current facility-administered medications for this visit.      Controlled medications:   not applicable/none     Past Medical History:   Past Medical History:   Diagnosis Date    Acute on chronic diastolic congestive heart  failure (H) 02/09/2022    Arthritis 2019    Hips    ASCUS favor benign 08/2015    Neg HPV    Depressive disorder 2010    H/O colposcopy with cervical biopsy 09/14/2015    Bx & ECC - negative    Hypertension 2019    LSIL on Pap smear 07/2014    Neg high risk HPV    Multiple sclerosis (H) 08/29/2005 9/18/200pt dx with MS 2004--dx by neurolgist and is followed by Dr. Harris    Myocardial infarction (H)     Obese     Pneumonia due to infectious organism, unspecified laterality, unspecified part of lung 02/08/2022    Sepsis (Temp 101, , WBC 13.0) 10/23/2018    Shingles 10/2016    SIRS (systemic inflammatory response syndrome) (H) 03/04/2021    Sleep apnea     UNSPEC CONSTIPATION 09/18/2006 9/18/2006   Has tried otc suppository and otc lax.      Physical Exam:   Vital signs:/77   Pulse 92   Temp 97.4  F (36.3  C)   Resp 20   Wt 124.8 kg (275 lb 1.6 oz)   SpO2 92%   BMI 47.22 kg/m     GENERAL APPEARANCE: Well developed, well nourished, in no acute distress. Obese.   LUNGS: Diminished lung sounds in all fields  CARD: RRR, S1, S2, without murmurs, gallops, rubs, no JVD, peripheral pulses 2+ and symmetric  ABD: Soft, nondistended and nontender with normal bowel sounds.   MSK: Muscle strength and tone were equal bilaterally. Moves all extremities easily and intentionally.   EXTREMITIES: Bilateral lower extremity wraps in place  NEURO: Alert and oriented x 3. Normal affect. Sensation to touch was normal. Face is symmetric.  PSYCH: euthymic     SNF labs: Labs done in SNF are in [place] EPIC. Please refer to them using [employer]/Care Everywhere.    DISCHARGE PLAN:  Follow up labs: No labs orders/due. Please follow up with sleep study, resume Weygovy  Medical Follow Up:      Follow up with primary care provider in 1 week  Current [place] scheduled appointments:  Appointments in Next Year        Mar 17, 2025 10:45 AM  LAB with [place] [facility]  [facility] ([facility]  [place] ) [phone]     Mar 17, 2025 11:35 AM  (Arrive by 11:30 AM)  RETURN CORE with Fabian Reddy NP  [facility] ([facility] ) [phone]     Mar 17, 2025 3:30 PM  (Arrive by 3:15 PM)  Return Patient with Onur Chaudhry MD  [facility] ([facility]) [phone]     Apr 15, 2025 12:55 PM  (Arrive by 12:50 PM)  RETURN CORE with JULIETA Anguiano CNP  [facility] ([facility] ) [phone]     Apr 22, 2025 10:30 AM  (Arrive by 10:15 AM)  Return Sleep Patient with Yanet Pappas PA-C  [facility] [place] ([facility] ) [phone]          Discharge Services: Home Care:  Occupational Therapy, Physical Therapy, and Home Health Aide  Discharge Instructions Verbalized to Patient at Discharge:   None    TOTAL DISCHARGE TIME:   Greater than 30 minutes  Electronically signed by:  JULIETA Sparks CNP     Documentation of Face to Face and Certification for Home Health Services    I certify that patient: Bess Enamorado is under my care and that I, or a nurse practitioner or physician's assistant working with me, had a face-to-face encounter that meets the physician face-to-face encounter requirements with this patient on: 2/25/2025.    This encounter with the patient was in whole, or in part, for the following medical condition, which is the primary reason for home health care:   Fall  Possible acute to subacute CVA  Generalized Weakness  Multiple sclerosis with ongoing leg weakness  Lower extremity edema  Diastolic CHF  Buttock wound  NARCISA  Chronic hypoxia  Pulmonary HTN  Obesity hypoventilation syndrome    I certify that, based on my findings, the following services are medically necessary home health services: Occupational Therapy, Physical Therapy, and Home Health  Aide .    My clinical findings support the need for the above services because: Occupational Therapy Services are needed to assess and treat cognitive ability and address ADL safety due to impairment in ability to transfer independently safely. and Physical Therapy Services are needed to assess and treat the following functional impairments: ability to transfer independently safely.    Further, I certify that my clinical findings support that this patient is homebound (i.e. absences from home require considerable and taxing effort and are for medical reasons or [employer] services or infrequently or of short duration when for other reasons) because: Requires assistance of another person or specialized equipment to access medical services because patient: Is unable to walk greater than 400 feet without rest. and Requires supervision of another for safe transfer..    Based on the above findings. I certify that this patient is confined to the home and needs intermittent skilled nursing care, physical therapy and/or speech therapy.  The patient is under my care, and I have initiated the establishment of the plan of care.  This patient will be followed by a physician who will periodically review the plan of care.  Physician/Provider to provide follow up care: Mikala Luna    Attending hospital physician (the Medicare certified PEC provider): Shazia Le MD  Physician Signature: See electronic signature associated with these discharge orders.  Date: 2/25/2025

## 2025-02-25 NOTE — LETTER
2/25/2025      Bess Enamorado  [address]  [place] [address]        [facility] GERIATRICS DISCHARGE SUMMARY  PATIENT'S NAME: Bess Enamorado  YOB: 1975  MEDICAL RECORD NUMBER:  [id number]  [facility] [[id number]]    PRIMARY CARE PROVIDER AND CLINIC RESPONSIBLE AFTER TRANSFER:   Mikala Luna MD, [address] [facility] [address]      Transferring providers: JULIETA Sparks CNP, Shazia Le MD  Recent Hospitalization/ED:  [facility] stay 2/11/25 to 2/18/25.  Date of SNF Admission: 2/18/25  Date of [facility] (anticipated) Discharge: 2/28/25  Discharged to: previous independent home  Cognitive Scores: not completed due to no cognitive concerns  Physical Function: Independent short distances with FWW, uses w/c at home also, independ with dressing/toileting/lymph compression,   DME: No new DME needed    CODE STATUS/ADVANCE DIRECTIVES DISCUSSION:  Prior Full CODE  ALLERGIES: Patient has no known allergies.    NURSING FACILITY COURSE   Medication Changes/Rationale:   Bowel regimen changes    Summary of nursing facility stay:   She was stable throughout her stay. No concerns today. Followed by wound MD for leg and buttock wounds. Sleeping well. Appetite stable. Mood stable. Having daily bowel movements. She is requesting miralax and senna be discontinued. Denies urinary symptoms including dysuria or hematuria. Denies shortness of breath, chest pain, palpitations, dizziness, lightheadedness.     ASSESSMENT/PLAN:  Fall  Possible acute to subacute CVA  Generalized Weakness  Multiple sclerosis with ongoing leg weakness  Acute, chronic, tenuous  Fall likely secondary to MS and/or CVA  Continue PT/OT  Continue Plavix x 3 weeks and statin started in hospital  Continue ASA  MRI demonstrates no new active lesions- not on MS meds     Lower extremity edema  Diastolic CHF  Buttock  wound  ECHO EF 65 %  Continue spironolactone 12.5 mg daily, Jardiance 10 mg daily, Bumex 6 mg daily   OT wrapping legs, in house wound MD following leg and buttock wounds     NARCISA  Chronic hypoxia  Pulmonary HTN  Obesity hypoventilation syndrome  No changes today  Continue supplemental oxygen (3 liters)  Continue home settings CPAP  Follows with Pulmonology Dr. Chaudhry  Follow up with PCP for outpatient sleep study     Morbid obesity  BMI 45-49.9  Recently started Wegovy, holding while in TCU- PCP to resume upon discharge from TCU  Weight today 275.1 lbs   No changes today     Constipation  BM on 2/24/25  Discontinue miralax and senna       Current Outpatient Medications   Medication Sig Dispense Refill     acetaminophen (TYLENOL) 325 MG tablet Take 2 tablets (650 mg) by mouth every 4 hours as needed for mild pain or other (and adjunct with moderate or severe pain or per patient request). 20 tablet 0     aspirin (ASPIRIN LOW DOSE) 81 MG EC tablet Take 1 tablet (81 mg) by mouth daily. 90 tablet 1     bumetanide (BUMEX) 2 MG tablet Take 3 tablets (6 mg) by mouth daily. 360 tablet 1     clopidogrel (PLAVIX) 75 MG tablet Take 1 tablet (75 mg) by mouth daily for 16 days. 16 tablet 0     Emollient (GOLD BOND MEDICATED BODY EX) Externally apply 1 Application topically 2 times daily as needed       empagliflozin (JARDIANCE) 10 MG TABS tablet Take 1 tablet (10 mg) by mouth daily. 90 tablet 1     escitalopram (LEXAPRO) 10 MG tablet Take 1 tablet (10 mg) by mouth daily. 90 tablet 1     potassium chloride sheila ER (KLOR-CON M10) 10 MEQ CR tablet Take 3 tablets (30 mEq) by mouth daily. 90 tablet 1     simvastatin (ZOCOR) 10 MG tablet Take 1 tablet (10 mg) by mouth at bedtime. 30 tablet 1     spironolactone (ALDACTONE) 25 MG tablet Take 0.5 tablets (12.5 mg) by mouth daily. 30 tablet 0     No current facility-administered medications for this visit.      Controlled medications:   not applicable/none     Past Medical History:   Past  Medical History:   Diagnosis Date     Acute on chronic diastolic congestive heart failure (H) 02/09/2022     Arthritis 2019    Hips     ASCUS favor benign 08/2015    Neg HPV     Depressive disorder 2010     H/O colposcopy with cervical biopsy 09/14/2015    Bx & ECC - negative     Hypertension 2019     LSIL on Pap smear 07/2014    Neg high risk HPV     Multiple sclerosis (H) 08/29/2005 9/18/200pt dx with MS 2004--dx by neurolgist and is followed by Dr. Harris     Myocardial infarction (H)      Obese      Pneumonia due to infectious organism, unspecified laterality, unspecified part of lung 02/08/2022     Sepsis (Temp 101, , WBC 13.0) 10/23/2018     Shingles 10/2016     SIRS (systemic inflammatory response syndrome) (H) 03/04/2021     Sleep apnea      UNSPEC CONSTIPATION 09/18/2006 9/18/2006   Has tried otc suppository and otc lax.      Physical Exam:   Vital signs:/77   Pulse 92   Temp 97.4  F (36.3  C)   Resp 20   Wt 124.8 kg (275 lb 1.6 oz)   SpO2 92%   BMI 47.22 kg/m     GENERAL APPEARANCE: Well developed, well nourished, in no acute distress. Obese.   LUNGS: Diminished lung sounds in all fields  CARD: RRR, S1, S2, without murmurs, gallops, rubs, no JVD, peripheral pulses 2+ and symmetric  ABD: Soft, nondistended and nontender with normal bowel sounds.   MSK: Muscle strength and tone were equal bilaterally. Moves all extremities easily and intentionally.   EXTREMITIES: Bilateral lower extremity wraps in place  NEURO: Alert and oriented x 3. Normal affect. Sensation to touch was normal. Face is symmetric.  PSYCH: euthymic     SNF labs: Labs done in SNF are in [place] EPIC. Please refer to them using EPIC/Care Everywhere.    DISCHARGE PLAN:  Follow up labs: No labs orders/due. Please follow up with sleep study, resume Weygovy  Medical Follow Up:      Follow up with primary care provider in 1 week  Current [place] scheduled appointments:  Appointments in Next Year        Mar 17, 2025 10:45  AM  LAB with [place] [facility] ([facility] ) [phone]     Mar 17, 2025 11:35 AM  (Arrive by 11:30 AM)  RETURN CORE with Fabian Reddy NP  [facility] ([facility] ) [phone]     Mar 17, 2025 3:30 PM  (Arrive by 3:15 PM)  Return Patient with Onur Chaudhry MD  [facility] ([facility]) [phone]     Apr 15, 2025 12:55 PM  (Arrive by 12:50 PM)  RETURN CORE with JULIETA Anguiano CNP  [facility] ([facility] ) [phone]     Apr 22, 2025 10:30 AM  (Arrive by 10:15 AM)  Return Sleep Patient with Yanet Pappas PA-C  [facility] [place] ([facility] ) [phone]          Discharge Services: Home Care:  Occupational Therapy, Physical Therapy, and Home Health Aide  Discharge Instructions Verbalized to Patient at Discharge:   None    TOTAL DISCHARGE TIME:   Greater than 30 minutes  Electronically signed by:  JULIETA Sparks CNP     Documentation of Face to Face and Certification for Home Health Services    I certify that patient: Bess Enamorado is under my care and that I, or a nurse practitioner or physician's assistant working with me, had a face-to-face encounter that meets the physician face-to-face encounter requirements with this patient on: 2/25/2025.    This encounter with the patient was in whole, or in part, for the following medical condition, which is the primary reason for home health care:   Fall  Possible acute to subacute CVA  Generalized Weakness  Multiple sclerosis with ongoing leg weakness  Lower extremity edema  Diastolic CHF  Buttock wound  NARCISA  Chronic hypoxia  Pulmonary HTN  Obesity hypoventilation syndrome    I certify that, based on my findings, the following services are  medically necessary home health services: Occupational Therapy, Physical Therapy, and Home Health Aide .    My clinical findings support the need for the above services because: Occupational Therapy Services are needed to assess and treat cognitive ability and address ADL safety due to impairment in ability to transfer independently safely. and Physical Therapy Services are needed to assess and treat the following functional impairments: ability to transfer independently safely.    Further, I certify that my clinical findings support that this patient is homebound (i.e. absences from home require considerable and taxing effort and are for medical reasons or [employer] services or infrequently or of short duration when for other reasons) because: Requires assistance of another person or specialized equipment to access medical services because patient: Is unable to walk greater than 400 feet without rest. and Requires supervision of another for safe transfer..    Based on the above findings. I certify that this patient is confined to the home and needs intermittent skilled nursing care, physical therapy and/or speech therapy.  The patient is under my care, and I have initiated the establishment of the plan of care.  This patient will be followed by a physician who will periodically review the plan of care.  Physician/Provider to provide follow up care: Mikala Luna    Attending hospital physician (the Medicare certified PEC provider): Shazia Le MD  Physician Signature: See electronic signature associated with these discharge orders.  Date: 2/25/2025               Sincerely,        JULIETA Sparks CNP    Electronically signed

## 2025-02-25 NOTE — LETTER
2/25/2025      Bess Enamorado  [address]  [facility] [address]        No notes on file      Sincerely,        Maeve Osorio, JULIETA CNP    Electronically signed

## 2025-03-03 ENCOUNTER — TELEPHONE (OUTPATIENT)
Dept: FAMILY MEDICINE | Facility: CLINIC | Age: 50
End: 2025-03-03
Payer: COMMERCIAL

## 2025-03-03 NOTE — TELEPHONE ENCOUNTER
General Call    Contacts       Contact Date/Time Type Contact Phone/Fax    03/03/2025 11:52 AM CST Phone (Incoming) rosemary [phone]     [facility] Care nurse          Reason for Call:  Nurse called to say Ok to start home care for tomorrow, 3/4/25.    What are your questions or concerns:  na    Date of last appointment with provider: 11/7/24    Could we send this information to you in [employer] or would you prefer to receive a phone call?:   Patient would prefer a phone call   Okay to leave a detailed message?: N/A at Other phone number:  [phone] (Rosemary)

## 2025-03-04 ENCOUNTER — TELEPHONE (OUTPATIENT)
Dept: FAMILY MEDICINE | Facility: CLINIC | Age: 50
End: 2025-03-04
Payer: COMMERCIAL

## 2025-03-04 ENCOUNTER — PATIENT OUTREACH (OUTPATIENT)
Dept: CARE COORDINATION | Facility: CLINIC | Age: 50
End: 2025-03-04
Payer: COMMERCIAL

## 2025-03-04 DIAGNOSIS — I63.9 ISCHEMIC CEREBROVASCULAR ACCIDENT (CVA) (H): ICD-10-CM

## 2025-03-04 DIAGNOSIS — Z09 HOSPITAL DISCHARGE FOLLOW-UP: ICD-10-CM

## 2025-03-04 ASSESSMENT — ACTIVITIES OF DAILY LIVING (ADL): DEPENDENT_IADLS:: CLEANING;LAUNDRY;TRANSPORTATION

## 2025-03-04 NOTE — TELEPHONE ENCOUNTER
Home Care is calling regarding an established patient with [facility].  Requesting orders from: Mikala Luna  RN APPROVED: RN able to provide verbal orders.  Home Care will send orders for signature.  RN will close encounter.  Is this a request for a temporary pause in the home care episode?  No    Orders Requested    Physical Therapy  Request for initial certification (first set of orders)   Frequency: 1 x/wk x 4 wks, 1 every other week x 4 wks working on balance, strengthening and mobility.  RN gave verbal order: Yes      Social Work  Request for initial evaluation and treatment (one time) for community resources  RN gave verbal order: Yes      HHA (Home Health Aide)  Request for initial certification (first set of orders)   Frequency: 1 x/wk x 2 weeks for bathing assist  RN gave verbal order: Yes      Caller: Denice ABRAHAM  Home Care Agency: [facility]  Phone number Home Care can be reached at: [phone]  Okay to leave a detailed message?: Yes    Julie Behrendt, RN     Patient with one or more new problems requiring additional work-up/treatment.

## 2025-03-04 NOTE — TELEPHONE ENCOUNTER
Requested Prescriptions   Pending Prescriptions Disp Refills    simvastatin (ZOCOR) 10 MG tablet 30 tablet 1     Sig: Take 1 tablet (10 mg) by mouth at bedtime.       There is no refill protocol information for this order        Last Written Prescription Date:  2/17/25  Last Fill Quantity: 30,  # refills: 1    Future Office Visit:  Dr. Luna [facility] follow up 3/24/25

## 2025-03-04 NOTE — TELEPHONE ENCOUNTER
Clinic Care Coordination RN :      Patient was discharged from the TCU 2/28.    Patient needs a refill on Zocor . Send to [facility] in [facility]   Meenakshi Campbell RN, Care Coordinator   [facility]'s   E-mail [email or web address]   [phone]

## 2025-03-04 NOTE — PROGRESS NOTES
Clinic Care Coordination Contact  Clinic Care Coordination Contact  OUTREACH    Referral Information:  Referral Source: IP Handoff    Primary Diagnosis: Neurological Disorders    Chief Complaint   Patient presents with    Clinic Care Coordination - Post Hospital     Clinic Care Coordination RN         Universal Utilization:   [facility]     Discharge Summary  Hospitalist     Date of Admission:  2/11/2025  Date of Discharge:  2/18/2025  Discharging Provider: Eliazar Vargas MD  Date of Service (when I saw the patient): 02/18/25        Discharge Diagnoses  Fall  Possible acute to subacute CVA  MS with weakness  Obesity related hypoventilation syndrome  NARCISA  Chronic diastolic CHF  Pulmonary hypertension with chronic hypoxia  UTI   .      Assessment: Patient has transitioned to TCU/ARU for short term rehabilitation:     Facility Name: [facility] TCU discharge 2/28/2025  Clinic Utilization  Difficulty keeping appointments:: No  Compliance Concerns: No  No-Show Concerns: No  No PCP office visit in Past Year: No  Utilization      No Show Count (past year)  1             ED Visits  5             Hospital Admissions  5                    Current as of: 3/4/2025  3:20 PM                Clinical Concerns:  Current Medical Concerns:  Patient is doing well  Patient has had a home care visit and pt will be coming once a week and a HHA will come to assist with a shower     Current Behavioral Concerns: No    Education Provided to patient: CC RN role introduced    Pain  Pain (GOAL):: No  Health Maintenance Reviewed: Not assessed  Clinical Pathway: None    Medication Management:  Medication review status: Medications reviewed.  Changes noted per patient report.       Functional Status:  Dependent ADLs:: Ambulation-walker, Wheelchair-independent  Dependent IADLs:: Cleaning, Laundry, Transportation  Bed or wheelchair confined:: No  Mobility Status: Independent w/Device  Fallen 2 or more times in  the past year?: No  Any fall with injury in the past year?: No    Living Situation:  Current living arrangement:: I live in a private home  Type of residence:: Private home - stairs    Lifestyle & Psychosocial Needs:    Social Drivers of Health     Food Insecurity: Low Risk  (2/12/2025)    Food Insecurity     Within the past 12 months, did you worry that your food would run out before you got money to buy more?: No     Within the past 12 months, did the food you bought just not last and you didn t have money to get more?: No   Depression: Not at risk (10/2/2024)    PHQ-2     PHQ-2 Score: 2   Housing Stability: Low Risk  (2/12/2025)    Housing Stability     Do you have housing? : Yes     Are you worried about losing your housing?: No   Tobacco Use: Low Risk  (1/13/2025)    Patient History     Smoking Tobacco Use: Never     Smokeless Tobacco Use: Never     Passive Exposure: Not on file   Financial Resource Strain: Low Risk  (2/12/2025)    Financial Resource Strain     Within the past 12 months, have you or your family members you live with been unable to get utilities (heat, electricity) when it was really needed?: No   Alcohol Use: Not on file   Transportation Needs: Low Risk  (2/12/2025)    Transportation Needs     Within the past 12 months, has lack of transportation kept you from medical appointments, getting your medicines, non-medical meetings or appointments, work, or from getting things that you need?: No   Physical Activity: Inactive (3/21/2024)    Exercise Vital Sign     Days of Exercise per Week: 0 days     Minutes of Exercise per Session: 0 min   Interpersonal Safety: Low Risk  (2/12/2025)    Interpersonal Safety     Do you feel physically and emotionally safe where you currently live?: Yes     Within the past 12 months, have you been hit, slapped, kicked or otherwise physically hurt by someone?: No     Within the past 12 months, have you been humiliated or emotionally abused in other ways by your partner  "or ex-partner?: No   Stress: No Stress Concern Present (12/4/2020)    [employer] [place] [employer] - Occupational Stress Questionnaire     Feeling of Stress : Only a little   Social Connections: Unknown (3/1/2022)    Social Connection and Isolation Panel [NHANES]     Frequency of Communication with Friends and Family: Twice a week     Frequency of Social Gatherings with Friends and Family: Twice a week     Attends Temple Services: Not on file     Active Member of Clubs or Organizations: Not on file     Attends Club or Organization Meetings: Not on file     Marital Status: Not on file   Health Literacy: Not on file     Diet:: No added salt  Inadequate nutrition (GOAL):: No  Tube Feeding: No  Inadequate activity/exercise (GOAL):: No  Significant changes in sleep pattern (GOAL): No  Transportation means:: Family     Temple or spiritual beliefs that impact treatment:: No  Mental health DX:: Yes  Mental health DX how managed:: Medication  Mental health management concern (GOAL):: No  Chemical Dependency Status: No Current Concerns  Informal Support system:: Family           Resources and Interventions:  Current Resources:   Skilled Home Care Services: Skilled Nursing, Home Health Aid, Physical Therapy  Community Resources: DME, Transitional Care, Home Care  Supplies Currently Used at Home: Incontinence Supplies, Oxygen Tubing/Supplies, Other (CPAP)  Equipment Currently Used at Home: commode chair, grab bar, toilet, grab bar, tub/shower, shower chair, walker, rolling, wheelchair, manual, other (see comments) (\"grabber\", lift chair)  Employment Status: disabled         Advance Care Plan/Directive  Advanced Care Plans/Directives on file:: No    Referrals Placed: None    Patient/Caregiver understanding: Patient expresses a good understanding of discharge instructions        Future Appointments                In 1 week [facility], FLWY    In 1 week Fabian Reddy, " NP [facility] Heart [facility] PSA CLIN    In 1 week Onur Chaudhry MD [facility] Specialty [facility] Beam, Beam    In 2 weeks Mikala Luna MD [facility], FLCL    In 1 month Christin Holt APRN CNP [facility] PSA CLIN    In 1 month Yanet Pappas PA-C [facility] SLEEP            Plan:   Patient will continue home care services   Patient will keep hospital follow up with PCP 3/24/2025  Patient will make a future Neurology appointment    [facility]   Meenakshi Campbell RN, Care Coordinator   [facility]'s   E-mail [email or web address]   [phone]

## 2025-03-05 ENCOUNTER — TELEPHONE (OUTPATIENT)
Dept: FAMILY MEDICINE | Facility: CLINIC | Age: 50
End: 2025-03-05
Payer: COMMERCIAL

## 2025-03-05 ENCOUNTER — TELEPHONE (OUTPATIENT)
Dept: PHARMACY | Facility: CLINIC | Age: 50
End: 2025-03-05
Payer: COMMERCIAL

## 2025-03-05 DIAGNOSIS — I63.9 ISCHEMIC CEREBROVASCULAR ACCIDENT (CVA) (H): ICD-10-CM

## 2025-03-05 RX ORDER — SIMVASTATIN 10 MG
10 TABLET ORAL AT BEDTIME
Qty: 30 TABLET | Refills: 1 | Status: CANCELLED | OUTPATIENT
Start: 2025-03-05

## 2025-03-05 RX ORDER — SIMVASTATIN 10 MG
10 TABLET ORAL AT BEDTIME
Qty: 30 TABLET | Refills: 1 | Status: SHIPPED | OUTPATIENT
Start: 2025-03-05

## 2025-03-05 NOTE — TELEPHONE ENCOUNTER
Spoke with Denice and given home care orders as requested below.     Marce Wilcox RN on 3/5/2025 at 9:36 AM

## 2025-03-05 NOTE — TELEPHONE ENCOUNTER
MTM referral from: Transitions of Care (recent hospital discharge, TCU discharge, or ED visit)    MTM referral outreach attempt #1 on March 5, 2025 at 10:36 AM      Outcome: Spoke with patient declined    Use [facility] part d map tcu Discharge: 2/28/25 for the carrier/Plan on the flowsheet          Jessica Rodriguez [facility]  -[facility]  [phone]

## 2025-03-12 ENCOUNTER — TELEPHONE (OUTPATIENT)
Dept: FAMILY MEDICINE | Facility: CLINIC | Age: 50
End: 2025-03-12
Payer: COMMERCIAL

## 2025-03-12 NOTE — TELEPHONE ENCOUNTER
Denice from [facility] PT calling for verbal order  Requesting skilled nursing evaluation and treat    Gave verbal order per standing order    Marce Wilcox, RN on 3/12/2025 at 11:59 AM

## 2025-03-13 ENCOUNTER — TELEPHONE (OUTPATIENT)
Dept: FAMILY MEDICINE | Facility: CLINIC | Age: 50
End: 2025-03-13
Payer: COMMERCIAL

## 2025-03-13 NOTE — TELEPHONE ENCOUNTER
Home Care is calling regarding an established patient with [facility].  Requesting orders from: Mikala Luna  RN APPROVED: RN able to provide verbal orders.  Home Care will send orders for signature.  RN will close encounter.  Is this a request for a temporary pause in the home care episode?  No    Orders Requested    HHA (Home Health Aide)  Request for initial certification (first set of orders)   Frequency: 1x/week for 2 weeks.   RN gave verbal order: Yes      Caller: LEIGH Galdamez  Home Care Agency: [facility]  Phone number Home Care can be reached at: [phone]  Okay to leave a detailed message?: N/A    JAMAL MATAMOROS, RN

## 2025-03-17 ENCOUNTER — LAB (OUTPATIENT)
Dept: LAB | Facility: CLINIC | Age: 50
End: 2025-03-17
Payer: COMMERCIAL

## 2025-03-17 ENCOUNTER — OFFICE VISIT (OUTPATIENT)
Dept: CARDIOLOGY | Facility: CLINIC | Age: 50
End: 2025-03-17
Payer: COMMERCIAL

## 2025-03-17 ENCOUNTER — TELEPHONE (OUTPATIENT)
Dept: FAMILY MEDICINE | Facility: CLINIC | Age: 50
End: 2025-03-17

## 2025-03-17 VITALS
BODY MASS INDEX: 46.17 KG/M2 | SYSTOLIC BLOOD PRESSURE: 106 MMHG | OXYGEN SATURATION: 96 % | DIASTOLIC BLOOD PRESSURE: 67 MMHG | WEIGHT: 269 LBS | RESPIRATION RATE: 20 BRPM | HEART RATE: 84 BPM

## 2025-03-17 DIAGNOSIS — I50.32 CHRONIC HEART FAILURE WITH PRESERVED EJECTION FRACTION (H): ICD-10-CM

## 2025-03-17 DIAGNOSIS — I50.32 CHRONIC HEART FAILURE WITH PRESERVED EJECTION FRACTION (H): Primary | ICD-10-CM

## 2025-03-17 DIAGNOSIS — K74.60 CIRRHOSIS OF LIVER WITHOUT ASCITES, UNSPECIFIED HEPATIC CIRRHOSIS TYPE (H): ICD-10-CM

## 2025-03-17 LAB
AFP SERPL-MCNC: <1.8 NG/ML
ALBUMIN SERPL BCG-MCNC: 3.9 G/DL (ref 3.5–5.2)
ALP SERPL-CCNC: 86 U/L (ref 40–150)
ALT SERPL W P-5'-P-CCNC: 14 U/L (ref 0–50)
ANION GAP SERPL CALCULATED.3IONS-SCNC: 12 MMOL/L (ref 7–15)
AST SERPL W P-5'-P-CCNC: 26 U/L (ref 0–45)
BILIRUB DIRECT SERPL-MCNC: 0.81 MG/DL (ref 0–0.3)
BILIRUB SERPL-MCNC: 2.5 MG/DL
BUN SERPL-MCNC: 22.5 MG/DL (ref 6–20)
CALCIUM SERPL-MCNC: 10.2 MG/DL (ref 8.8–10.4)
CHLORIDE SERPL-SCNC: 95 MMOL/L (ref 98–107)
CREAT SERPL-MCNC: 0.84 MG/DL (ref 0.51–0.95)
EGFRCR SERPLBLD CKD-EPI 2021: 84 ML/MIN/1.73M2
ERYTHROCYTE [DISTWIDTH] IN BLOOD BY AUTOMATED COUNT: 17 % (ref 10–15)
GLUCOSE SERPL-MCNC: 86 MG/DL (ref 70–99)
HCO3 SERPL-SCNC: 28 MMOL/L (ref 22–29)
HCT VFR BLD AUTO: 45.7 % (ref 35–47)
HGB BLD-MCNC: 14.9 G/DL (ref 11.7–15.7)
INR PPP: 1.28 (ref 0.85–1.15)
MCH RBC QN AUTO: 28.9 PG (ref 26.5–33)
MCHC RBC AUTO-ENTMCNC: 32.6 G/DL (ref 31.5–36.5)
MCV RBC AUTO: 89 FL (ref 78–100)
NT-PROBNP SERPL-MCNC: 2379 PG/ML (ref 0–900)
PLATELET # BLD AUTO: 162 10E3/UL (ref 150–450)
POTASSIUM SERPL-SCNC: 4 MMOL/L (ref 3.4–5.3)
PROT SERPL-MCNC: 8.2 G/DL (ref 6.4–8.3)
RBC # BLD AUTO: 5.16 10E6/UL (ref 3.8–5.2)
SODIUM SERPL-SCNC: 135 MMOL/L (ref 135–145)
WBC # BLD AUTO: 4.5 10E3/UL (ref 4–11)

## 2025-03-17 PROCEDURE — 36415 COLL VENOUS BLD VENIPUNCTURE: CPT

## 2025-03-17 PROCEDURE — 85027 COMPLETE CBC AUTOMATED: CPT

## 2025-03-17 PROCEDURE — 85610 PROTHROMBIN TIME: CPT

## 2025-03-17 PROCEDURE — 82105 ALPHA-FETOPROTEIN SERUM: CPT

## 2025-03-17 PROCEDURE — 82248 BILIRUBIN DIRECT: CPT

## 2025-03-17 PROCEDURE — 80053 COMPREHEN METABOLIC PANEL: CPT

## 2025-03-17 RX ORDER — BUMETANIDE 2 MG/1
TABLET ORAL
Qty: 270 TABLET | Refills: 3 | Status: SHIPPED | OUTPATIENT
Start: 2025-03-17

## 2025-03-17 NOTE — PROGRESS NOTES
Cardiology Clinic Progress Note    C.O.R.E. Clinic Visit (Heart Failure Specialty Clinic)    Service Date: March 17, 2025  Primary Cardiology Team: Dr. Diaz  Reason for Visit: Hospital follow up    HPI:   I had the pleasure of seeing Bess Enamorado in the clinic today. She is a very pleasant 49 year old female with a past medical history notable for hypertension, history of pulmonary embolism, severe pulmonary hypertension, NARCISA on CPAP, morbid obesity with BMI around 56, chronic HFpEF with primarily right-sided heart failure and RV dysfunction, multiple sclerosis, lymphedema, and binge eating disorder.     Over the years, patient has had progressive RV dilation and dysfunction on her echocardiograms. Her most recent echocardiogram in July of this year showed severe pulmonary hypertension with RVSP 82 mm hg +RA, and IVC diameter >2.1 cm collapsing <50% with sniff suggesting a high RA pressure  estimated at 15 mmHg or greater. Her LV function is hyperdynamic.  She has mild mitral stenosis.     The patient last met with my colleague, Christin Holt NP on 1/13/25. She appeared significantly volume overloaded at that time with her weight trending up to 310 pounds.  The dose of her Bumex was increased from 4 mg once daily to 6 mg once daily.     In the interim, she was admitted at [facility] on 2/11/2025 following a fall.  Cardiology was consulted due to worsening shortness of breath and volume overload and as she was found to have acute  on chronic HFpEF/right-sided heart failure exacerbation.  Treated with IV albumin infusion and IV diuresis with her weight trending down significantly around 276 pounds at the time of discharge. She was discharged initially to a TCU and has been back home now since 2/28/25.  She states she struggles with checking her weights consistently with her history of binge eating disorder, but tries to do this at least once a week. Bess states that her weight on her home scale on  3/3 was reportedly down ultimately to a level of 256 lbs then went up to 265 lbs the following week, and trended up further today to a peak of 269 pounds which is what we got in the clinic today.    Today, Bess tells me that she has been feeling okay from a cardiac standpoint. She remains on oxygen most of the day at 3 L and has been using her CPAP consistently for management of sleep apnea.  She feels that her respiratory status has been stable and she has not noticed any particular worsening in terms of her shortness of breath, but she has noted significantly worsening lower extremity edema with the recent 10-15 pound weight gain over the last couple of weeks at home.  She has mostly been getting around with a wheelchair and notes that she caught one of her legs on the chair which has resulted in a few small cuts/wounds which reportedly have been draining a lot with significant weeping edema particularly in her left leg.  She has upcoming visits with wound care for help with management of this and has been wrapping her legs.  She feels that since wrapping her legs over the past 3 days her abdomen has become more bloated and hard. She otherwise denies any symptoms of chest pain, palpitations, dizziness, or lightheadedness.  She remains on Bumex at 6 mg once daily and states that she has been taking this fairly consistently.    Labs were checked prior to the visit today with NT pro BNP level elevated at over 2300.  Basic metabolic panel show stable electrolytes and renal function with potassium goal 4.0 and creatinine at 0.84.  A CBC was checked and is unremarkable with no anemia with stable hemoglobin level of 14.9.    ASSESSMENT:  Severe right-sided heart failure with acute on chronic HFpEF  - Weight reportedly was trending down significantly initially following her recent hospitalization down to a level of 256 pounds at home, suspected secondary in part to some caloric weight loss.  Her weight has now gradually  started to trend back up fairly rapidly to 269 pounds in clinic today with worsening weeping lower extremity edema bilaterally up to the thighs and into her abdomen. Currently on Bumex 6 mg once daily, Jardiance 10 mg daily, spironolactone 12.5 mg daily, and KCl at 30 mEq daily.  Severe pulmonary hypertension, oxygen dependent on 3 L/min at baseline  Chronic lymphedema  NARCISA on CPAP  Multiple sclerosis  Morbid obesity  History of pulmonary embolism    PLAN:  -Recommend she increase dose of Bumex from 6 mg once daily up to 8 mg once daily x 1 week with a repeat BMP in about 1 week.  - Will review with the [facility] nurse team and plan to reach out to her later this week for reassessment of her weight and symptoms after a few days with the higher dose of Bumex.  If her weight is not trending down and her swelling has not improved, then we reviewed that we could consider an IV diuretic infusion clinic visit next week for more aggressive diuresis to help prevent hospitalization for HFpEF and right-sided heart failure.  - Counseled on continuing to try to check weights as able, provided info on sticking to a low sodium diet, and reviewed signs/symptoms of volume overload to monitor for and when to contact the clinic.   - Follow up with Christin Holt NP in the [facility] in April as previously planned.     Thank you for the opportunity to participate in this patient's care. We would be happy to see her sooner if needed for any concerns in the meantime.    The longitudinal plan of care for the diagnosis(es)/condition(s) as documented were addressed during this visit. Due to the added complexity in care, I will continue to support Bess in the subsequent management and with ongoing continuity of care.    45 total minutes was spent today including chart review, precharting, history and exam, post visit documentation, and reviewing studies as outlined above.     Please kindly note that this document was completed in  part using voice recognition software. Although reviewed after completion, some word substitutions and typographical errors may occur. Please contact me if clarification is needed.     JULIETA Parks, CNP   Nurse Practitioner  [facility]    Orders this Visit:  Orders Placed This Encounter   Procedures    N terminal pro BNP outpatient    N terminal pro BNP outpatient    Basic metabolic panel     Orders Placed This Encounter   Medications    bumetanide (BUMEX) 2 MG tablet     Sig: Take 4 tablets (8 mg) once daily for 1 week starting on 3/17, then decrease back to to 3 tablets (6 mg) once daily.     Dispense:  270 tablet     Refill:  3     Medications Discontinued During This Encounter   Medication Reason    bumetanide (BUMEX) 2 MG tablet Reorder (No AVS)     Encounter Diagnosis   Name Primary?    Chronic heart failure with preserved ejection fraction (H) Yes       CURRENT MEDICATIONS:  Current Outpatient Medications   Medication Sig Dispense Refill    acetaminophen (TYLENOL) 325 MG tablet Take 2 tablets (650 mg) by mouth every 4 hours as needed for mild pain or other (and adjunct with moderate or severe pain or per patient request). 20 tablet 0    aspirin (ASPIRIN LOW DOSE) 81 MG EC tablet Take 1 tablet (81 mg) by mouth daily. 90 tablet 1    bumetanide (BUMEX) 2 MG tablet Take 4 tablets (8 mg) once daily for 1 week starting on 3/17, then decrease back to to 3 tablets (6 mg) once daily. 270 tablet 3    Emollient (GOLD BOND MEDICATED BODY EX) Externally apply 1 Application topically 2 times daily as needed      empagliflozin (JARDIANCE) 10 MG TABS tablet Take 1 tablet (10 mg) by mouth daily. 90 tablet 1    escitalopram (LEXAPRO) 10 MG tablet Take 1 tablet (10 mg) by mouth daily. 90 tablet 1    potassium chloride sheila ER (KLOR-CON M10) 10 MEQ CR tablet Take 3 tablets (30 mEq) by mouth daily. 90 tablet 1    simvastatin (ZOCOR) 10 MG tablet Take 1 tablet (10 mg) by mouth at bedtime. 30 tablet 1     spironolactone (ALDACTONE) 25 MG tablet Take 0.5 tablets (12.5 mg) by mouth daily. 30 tablet 0    clopidogrel (PLAVIX) 75 MG tablet Take 1 tablet (75 mg) by mouth daily for 16 days. 16 tablet 0       ALLERGIES  No Known Allergies    PAST MEDICAL, SURGICAL, FAMILY HISTORY:  History was reviewed and updated as needed, see medical record.    SOCIAL HISTORY:  Social History     Socioeconomic History    Marital status: Single     Spouse name: Not on file    Number of children: Not on file    Years of education: Not on file    Highest education level: Not on file   Occupational History     Employer: [employer]   Tobacco Use    Smoking status: Never    Smokeless tobacco: Never   Vaping Use    Vaping status: Never Used   Substance and Sexual Activity    Alcohol use: Yes     Comment: social    Drug use: No    Sexual activity: Not Currently     Partners: Male     Birth control/protection: Pill   Other Topics Concern    Parent/sibling w/ CABG, MI or angioplasty before 65F 55M? No   Social History Narrative    , 3rd-5th grade     Social Drivers of Health     Financial Resource Strain: Low Risk  (2/12/2025)    Financial Resource Strain     Within the past 12 months, have you or your family members you live with been unable to get utilities (heat, electricity) when it was really needed?: No   Food Insecurity: Low Risk  (2/12/2025)    Food Insecurity     Within the past 12 months, did you worry that your food would run out before you got money to buy more?: No     Within the past 12 months, did the food you bought just not last and you didn t have money to get more?: No   Transportation Needs: Low Risk  (2/12/2025)    Transportation Needs     Within the past 12 months, has lack of transportation kept you from medical appointments, getting your medicines, non-medical meetings or appointments, work, or from getting things that you need?: No   Physical Activity: Inactive (3/21/2024)    Exercise Vital  Sign     Days of Exercise per Week: 0 days     Minutes of Exercise per Session: 0 min   Stress: No Stress Concern Present (12/4/2020)    [employer] - Occupational Stress Questionnaire     Feeling of Stress : Only a little   Social Connections: Unknown (3/1/2022)    Social Connection and Isolation Panel [NHANES]     Frequency of Communication with Friends and Family: Twice a week     Frequency of Social Gatherings with Friends and Family: Twice a week     Attends Hindu Services: Not on file     Active Member of Clubs or Organizations: Not on file     Attends Club or Organization Meetings: Not on file     Marital Status: Not on file   Interpersonal Safety: Low Risk  (2/12/2025)    Interpersonal Safety     Do you feel physically and emotionally safe where you currently live?: Yes     Within the past 12 months, have you been hit, slapped, kicked or otherwise physically hurt by someone?: No     Within the past 12 months, have you been humiliated or emotionally abused in other ways by your partner or ex-partner?: No   Housing Stability: Low Risk  (2/12/2025)    Housing Stability     Do you have housing? : Yes     Are you worried about losing your housing?: No     Review of Systems:  Focused cardiovascular and respiratory review of systems is negative other than the symptoms noted above in the HPI.     Physical Exam:  Vitals: /67 (BP Location: Right arm, Patient Position: Sitting, Cuff Size: Adult Large)   Pulse 84   Resp 20   Wt 122 kg (269 lb)   LMP 03/03/2025 (Within Days)   SpO2 96%   BMI 46.17 kg/m     Wt Readings from Last 4 Encounters:   03/17/25 122 kg (269 lb)   02/25/25 124.8 kg (275 lb 1.6 oz)   02/24/25 124 kg (273 lb 6.4 oz)   02/19/25 124.7 kg (275 lb)     CONSTITUTIONAL: Alert and in no acute distress.  NECK: Thick neck.  Challenging to visualize JVP due to body habitus.  CARDIOVASCULAR:  Regular rate and rhythm. No murmur, rub or gallop.   RESPIRATORY: Breathing  non-labored. Lungs are clear to auscultation with no wheezes or crackles bilaterally.  GASTROINTESTINAL: Abdomen mildly distended and hard.  EXTREMITIES: 2+ pitting lower extremity edema up to the thighs bilaterally and into the abdomen.   NEURO/PSYCHIATRIC: No gross focal deficits. Affect appropriate. Mentation normal.     Recent Lab Results:  LIPID RESULTS:  Lab Results   Component Value Date    CHOL 81 02/12/2025    CHOL 162 02/11/2016    HDL 11 (L) 02/12/2025    HDL 64 02/11/2016    LDL 43 02/12/2025    LDL 75 02/11/2016    TRIG 134 02/12/2025    TRIG 113 02/11/2016    CHOLHDLRATIO 4.0 11/07/2011     LIVER ENZYME RESULTS:  Lab Results   Component Value Date    AST 26 03/17/2025    AST 18 03/07/2021    ALT 14 03/17/2025    ALT 17 03/07/2021     CBC RESULTS:  Lab Results   Component Value Date    WBC 4.5 03/17/2025    WBC 7.3 03/07/2021    RBC 5.16 03/17/2025    RBC 4.91 03/07/2021    HGB 14.9 03/17/2025    HGB 12.5 03/07/2021    HCT 45.7 03/17/2025    HCT 42.8 03/07/2021    MCV 89 03/17/2025    MCV 87 03/07/2021    MCH 28.9 03/17/2025    MCH 25.5 (L) 03/07/2021    MCHC 32.6 03/17/2025    MCHC 29.2 (L) 03/07/2021    RDW 17.0 (H) 03/17/2025    RDW 15.6 (H) 03/07/2021     03/17/2025     03/08/2021     BMP RESULTS:  Lab Results   Component Value Date     03/17/2025     03/07/2021    POTASSIUM 4.0 03/17/2025    POTASSIUM 3.6 09/06/2022    POTASSIUM 4.3 03/07/2021    CHLORIDE 95 (L) 03/17/2025    CHLORIDE 98 09/06/2022    CHLORIDE 105 03/07/2021    CO2 28 03/17/2025    CO2 25 09/06/2022    CO2 30 03/07/2021    ANIONGAP 12 03/17/2025    ANIONGAP 11 09/06/2022    ANIONGAP 4 03/07/2021    GLC 86 03/17/2025    GLC 81 02/17/2025    GLC 94 09/06/2022    GLC 99 03/07/2021    BUN 22.5 (H) 03/17/2025    BUN 22 09/06/2022    BUN 12 03/07/2021    CR 0.84 03/17/2025    CR 0.62 03/07/2021    GFRESTIMATED 84 03/17/2025    GFRESTIMATED >90 03/07/2021    GFRESTBLACK >90 03/07/2021    EVITA 10.2 03/17/2025     EVITA 9.4 03/07/2021      A1C RESULTS:  Lab Results   Component Value Date    A1C 5.6 02/12/2025    A1C 5.7 (H) 02/13/2020

## 2025-03-17 NOTE — PATIENT INSTRUCTIONS
If you have questions or concerns please call the [facility] nurse team at [phone] or send a [employer] message (Mon-Fri 8am-4pm).  If you have concerns after hours, please call [phone], option 2 to speak with an on call Cardiologist.    Today's plan:  - Increase the dose of bumex from 6 mg to 8 mg once daily for the next week, then go back to 6 mg once daily.  - We'll have one of the [facility] nurses reach out to you next week to check in on your symptoms and how you're responding to the higher dose of bumex. If you aren't noticing significant improvement, then we could get you in for an IV diuretic infusion clinic visit next week.   - Check non-fasting labs in about 1 week to recheck kidney function and electrolytes.   - Check your weights daily and try to stick to a low sodium diet (under 2,000 mg/day).  - Call the [facility] nurse team at the number listed above if you develop signs concerning for fluid retention, including weight gain of over 2 pounds in 1 day or over 5 pounds in 1 week, increasing shortness of breath, or increasing swelling in the legs or abdomen.  - Follow up with Christin in April as previously planned.    It was a pleasure to see you today!     JULIETA Parks, CNP  Nurse Practitioner  [facility]    Thank you for your visit with the [facility] today. [facility] stands for [facility].    The [facility] is a heart failure specialty clinic within the [facility] where you will work with specialized nurse practitioners, physician assistants, doctors, and registered nurses. They are dedicated to helping patients with heart failure to carefully adjust medications, receive education, and learn who and when to call if symptoms develop. They specialize in helping you better understand your condition, slow the progression of your disease, improve the length and quality of your life, help you detect future heart  problems before they become life threatening, and avoid hospitalizations.

## 2025-03-17 NOTE — TELEPHONE ENCOUNTER
Home Care is calling regarding an established patient with [facility].  Requesting orders from: Mikala Luna  PROVIDER AUTHORIZATION REQUIRED: RN unable to provide verbal approval for all orders.  See below for additional information.  RN will contact Home care with information after provider review.  Is this a request for a temporary pause in the home care episode?  No    Orders Requested    Skilled Nursing  Request for initial certification (first set of orders)   Frequency: 2 x wk for 3 weeks, then 1 x wk for 2 wks,  then every other week x 2 wks.    Has open areas on back of thigh.  Cleanse area, cover with adaptic, then cover with ABD. Wrap with kerlix then  wrap with Ace wrap to keep cover in place.    RN gave verbal order: No      Caller: Key  Home Care Agency: [facility]   Phone number Home Care can be reached at: [phone]  Okay to leave a detailed message?: Yes    Serg Porter RN

## 2025-03-17 NOTE — LETTER
3/17/2025    Mikala Luna MD  [address]  [facility] [address]    RE: Bess Enamorado       Dear Colleague,     I had the pleasure of seeing Bess Enamorado in the [facility].    Cardiology Clinic Progress Note    C.O.R.E. Clinic Visit (Heart Failure Specialty Clinic)    Service Date: March 17, 2025  Primary Cardiology Team: Dr. Diaz  Reason for Visit: Hospital follow up    HPI:   I had the pleasure of seeing Bess Enamorado in the clinic today. She is a very pleasant 49 year old female with a past medical history notable for hypertension, history of pulmonary embolism, severe pulmonary hypertension, NARCISA on CPAP, morbid obesity with BMI around 56, chronic HFpEF with primarily right-sided heart failure and RV dysfunction, multiple sclerosis, lymphedema, and binge eating disorder.     Over the years, patient has had progressive RV dilation and dysfunction on her echocardiograms. Her most recent echocardiogram in July of this year showed severe pulmonary hypertension with RVSP 82 mm hg +RA, and IVC diameter >2.1 cm collapsing <50% with sniff suggesting a high RA pressure  estimated at 15 mmHg or greater. Her LV function is hyperdynamic.  She has mild mitral stenosis.     The patient last met with my colleague, Christin Holt NP on 1/13/25. She appeared significantly volume overloaded at that time with her weight trending up to 310 pounds.  The dose of her Bumex was increased from 4 mg once daily to 6 mg once daily.     In the interim, she was admitted at [facility] on 2/11/2025 following a fall.  Cardiology was consulted due to worsening shortness of breath and volume overload and as she was found to have acute  on chronic HFpEF/right-sided heart failure exacerbation.  Treated with IV albumin infusion and IV diuresis with her weight trending down significantly around 276 pounds at the time of discharge. She was discharged initially to a TCU and has been back home now since 2/28/25.   She states she struggles with checking her weights consistently with her history of binge eating disorder, but tries to do this at least once a week. Bess states that her weight on her home scale on 3/3 was reportedly down ultimately to a level of 256 lbs then went up to 265 lbs the following week, and trended up further today to a peak of 269 pounds which is what we got in the clinic today.    Today, Bess tells me that she has been feeling okay from a cardiac standpoint. She remains on oxygen most of the day at 3 L and has been using her CPAP consistently for management of sleep apnea.  She feels that her respiratory status has been stable and she has not noticed any particular worsening in terms of her shortness of breath, but she has noted significantly worsening lower extremity edema with the recent 10-15 pound weight gain over the last couple of weeks at home.  She has mostly been getting around with a wheelchair and notes that she caught one of her legs on the chair which has resulted in a few small cuts/wounds which reportedly have been draining a lot with significant weeping edema particularly in her left leg.  She has upcoming visits with wound care for help with management of this and has been wrapping her legs.  She feels that since wrapping her legs over the past 3 days her abdomen has become more bloated and hard. She otherwise denies any symptoms of chest pain, palpitations, dizziness, or lightheadedness.  She remains on Bumex at 6 mg once daily and states that she has been taking this fairly consistently.    Labs were checked prior to the visit today with NT pro BNP level elevated at over 2300.  Basic metabolic panel show stable electrolytes and renal function with potassium goal 4.0 and creatinine at 0.84.  A CBC was checked and is unremarkable with no anemia with stable hemoglobin level of 14.9.    ASSESSMENT:  Severe right-sided heart failure with acute on chronic HFpEF  - Weight reportedly was  trending down significantly initially following her recent hospitalization down to a level of 256 pounds at home, suspected secondary in part to some caloric weight loss.  Her weight has now gradually started to trend back up fairly rapidly to 269 pounds in clinic today with worsening weeping lower extremity edema bilaterally up to the thighs and into her abdomen. Currently on Bumex 6 mg once daily, Jardiance 10 mg daily, spironolactone 12.5 mg daily, and KCl at 30 mEq daily.  Severe pulmonary hypertension, oxygen dependent on 3 L/min at baseline  Chronic lymphedema  NARCISA on CPAP  Multiple sclerosis  Morbid obesity  History of pulmonary embolism    PLAN:  -Recommend she increase dose of Bumex from 6 mg once daily up to 8 mg once daily x 1 week with a repeat BMP in about 1 week.  - Will review with the [facility] nurse team and plan to reach out to her later this week for reassessment of her weight and symptoms after a few days with the higher dose of Bumex.  If her weight is not trending down and her swelling has not improved, then we reviewed that we could consider an IV diuretic infusion clinic visit next week for more aggressive diuresis to help prevent hospitalization for HFpEF and right-sided heart failure.  - Counseled on continuing to try to check weights as able, provided info on sticking to a low sodium diet, and reviewed signs/symptoms of volume overload to monitor for and when to contact the clinic.   - Follow up with Christin Holt NP in the [facility] in April as previously planned.     Thank you for the opportunity to participate in this patient's care. We would be happy to see her sooner if needed for any concerns in the meantime.    The longitudinal plan of care for the diagnosis(es)/condition(s) as documented were addressed during this visit. Due to the added complexity in care, I will continue to support Bess in the subsequent management and with ongoing continuity of care.    45 total  minutes was spent today including chart review, precharting, history and exam, post visit documentation, and reviewing studies as outlined above.     Please kindly note that this document was completed in part using voice recognition software. Although reviewed after completion, some word substitutions and typographical errors may occur. Please contact me if clarification is needed.     JULIETA Parks, CNP   Nurse Practitioner  [facility]    Orders this Visit:  Orders Placed This Encounter   Procedures     N terminal pro BNP outpatient     N terminal pro BNP outpatient     Basic metabolic panel     Orders Placed This Encounter   Medications     bumetanide (BUMEX) 2 MG tablet     Sig: Take 4 tablets (8 mg) once daily for 1 week starting on 3/17, then decrease back to to 3 tablets (6 mg) once daily.     Dispense:  270 tablet     Refill:  3     Medications Discontinued During This Encounter   Medication Reason     bumetanide (BUMEX) 2 MG tablet Reorder (No AVS)     Encounter Diagnosis   Name Primary?     Chronic heart failure with preserved ejection fraction (H) Yes       CURRENT MEDICATIONS:  Current Outpatient Medications   Medication Sig Dispense Refill     acetaminophen (TYLENOL) 325 MG tablet Take 2 tablets (650 mg) by mouth every 4 hours as needed for mild pain or other (and adjunct with moderate or severe pain or per patient request). 20 tablet 0     aspirin (ASPIRIN LOW DOSE) 81 MG EC tablet Take 1 tablet (81 mg) by mouth daily. 90 tablet 1     bumetanide (BUMEX) 2 MG tablet Take 4 tablets (8 mg) once daily for 1 week starting on 3/17, then decrease back to to 3 tablets (6 mg) once daily. 270 tablet 3     Emollient (GOLD BOND MEDICATED BODY EX) Externally apply 1 Application topically 2 times daily as needed       empagliflozin (JARDIANCE) 10 MG TABS tablet Take 1 tablet (10 mg) by mouth daily. 90 tablet 1     escitalopram (LEXAPRO) 10 MG tablet Take 1 tablet (10 mg) by mouth daily. 90  tablet 1     potassium chloride sheila ER (KLOR-CON M10) 10 MEQ CR tablet Take 3 tablets (30 mEq) by mouth daily. 90 tablet 1     simvastatin (ZOCOR) 10 MG tablet Take 1 tablet (10 mg) by mouth at bedtime. 30 tablet 1     spironolactone (ALDACTONE) 25 MG tablet Take 0.5 tablets (12.5 mg) by mouth daily. 30 tablet 0     clopidogrel (PLAVIX) 75 MG tablet Take 1 tablet (75 mg) by mouth daily for 16 days. 16 tablet 0       ALLERGIES  No Known Allergies    PAST MEDICAL, SURGICAL, FAMILY HISTORY:  History was reviewed and updated as needed, see medical record.    SOCIAL HISTORY:  Social History     Socioeconomic History     Marital status: Single     Spouse name: Not on file     Number of children: Not on file     Years of education: Not on file     Highest education level: Not on file   Occupational History     Employer: [employer]   Tobacco Use     Smoking status: Never     Smokeless tobacco: Never   Vaping Use     Vaping status: Never Used   Substance and Sexual Activity     Alcohol use: Yes     Comment: social     Drug use: No     Sexual activity: Not Currently     Partners: Male     Birth control/protection: Pill   Other Topics Concern     Parent/sibling w/ CABG, MI or angioplasty before 65F 55M? No   Social History Narrative    , 3rd-5th grade     Social Drivers of Health     Financial Resource Strain: Low Risk  (2/12/2025)    Financial Resource Strain      Within the past 12 months, have you or your family members you live with been unable to get utilities (heat, electricity) when it was really needed?: No   Food Insecurity: Low Risk  (2/12/2025)    Food Insecurity      Within the past 12 months, did you worry that your food would run out before you got money to buy more?: No      Within the past 12 months, did the food you bought just not last and you didn t have money to get more?: No   Transportation Needs: Low Risk  (2/12/2025)    Transportation Needs      Within the past 12 months,  has lack of transportation kept you from medical appointments, getting your medicines, non-medical meetings or appointments, work, or from getting things that you need?: No   Physical Activity: Inactive (3/21/2024)    Exercise Vital Sign      Days of Exercise per Week: 0 days      Minutes of Exercise per Session: 0 min   Stress: No Stress Concern Present (12/4/2020)    [employer] - Occupational Stress Questionnaire      Feeling of Stress : Only a little   Social Connections: Unknown (3/1/2022)    Social Connection and Isolation Panel [NHANES]      Frequency of Communication with Friends and Family: Twice a week      Frequency of Social Gatherings with Friends and Family: Twice a week      Attends Evangelical Services: Not on file      Active Member of Clubs or Organizations: Not on file      Attends Club or Organization Meetings: Not on file      Marital Status: Not on file   Interpersonal Safety: Low Risk  (2/12/2025)    Interpersonal Safety      Do you feel physically and emotionally safe where you currently live?: Yes      Within the past 12 months, have you been hit, slapped, kicked or otherwise physically hurt by someone?: No      Within the past 12 months, have you been humiliated or emotionally abused in other ways by your partner or ex-partner?: No   Housing Stability: Low Risk  (2/12/2025)    Housing Stability      Do you have housing? : Yes      Are you worried about losing your housing?: No     Review of Systems:  Focused cardiovascular and respiratory review of systems is negative other than the symptoms noted above in the HPI.     Physical Exam:  Vitals: /67 (BP Location: Right arm, Patient Position: Sitting, Cuff Size: Adult Large)   Pulse 84   Resp 20   Wt 122 kg (269 lb)   LMP 03/03/2025 (Within Days)   SpO2 96%   BMI 46.17 kg/m     Wt Readings from Last 4 Encounters:   03/17/25 122 kg (269 lb)   02/25/25 124.8 kg (275 lb 1.6 oz)   02/24/25 124 kg (273 lb 6.4  oz)   02/19/25 124.7 kg (275 lb)     CONSTITUTIONAL: Alert and in no acute distress.  NECK: Thick neck.  Challenging to visualize JVP due to body habitus.  CARDIOVASCULAR:  Regular rate and rhythm. No murmur, rub or gallop.   RESPIRATORY: Breathing non-labored. Lungs are clear to auscultation with no wheezes or crackles bilaterally.  GASTROINTESTINAL: Abdomen mildly distended and hard.  EXTREMITIES: 2+ pitting lower extremity edema up to the thighs bilaterally and into the abdomen.   NEURO/PSYCHIATRIC: No gross focal deficits. Affect appropriate. Mentation normal.     Recent Lab Results:  LIPID RESULTS:  Lab Results   Component Value Date    CHOL 81 02/12/2025    CHOL 162 02/11/2016    HDL 11 (L) 02/12/2025    HDL 64 02/11/2016    LDL 43 02/12/2025    LDL 75 02/11/2016    TRIG 134 02/12/2025    TRIG 113 02/11/2016    CHOLHDLRATIO 4.0 11/07/2011     LIVER ENZYME RESULTS:  Lab Results   Component Value Date    AST 26 03/17/2025    AST 18 03/07/2021    ALT 14 03/17/2025    ALT 17 03/07/2021     CBC RESULTS:  Lab Results   Component Value Date    WBC 4.5 03/17/2025    WBC 7.3 03/07/2021    RBC 5.16 03/17/2025    RBC 4.91 03/07/2021    HGB 14.9 03/17/2025    HGB 12.5 03/07/2021    HCT 45.7 03/17/2025    HCT 42.8 03/07/2021    MCV 89 03/17/2025    MCV 87 03/07/2021    MCH 28.9 03/17/2025    MCH 25.5 (L) 03/07/2021    MCHC 32.6 03/17/2025    MCHC 29.2 (L) 03/07/2021    RDW 17.0 (H) 03/17/2025    RDW 15.6 (H) 03/07/2021     03/17/2025     03/08/2021     BMP RESULTS:  Lab Results   Component Value Date     03/17/2025     03/07/2021    POTASSIUM 4.0 03/17/2025    POTASSIUM 3.6 09/06/2022    POTASSIUM 4.3 03/07/2021    CHLORIDE 95 (L) 03/17/2025    CHLORIDE 98 09/06/2022    CHLORIDE 105 03/07/2021    CO2 28 03/17/2025    CO2 25 09/06/2022    CO2 30 03/07/2021    ANIONGAP 12 03/17/2025    ANIONGAP 11 09/06/2022    ANIONGAP 4 03/07/2021    GLC 86 03/17/2025    GLC 81 02/17/2025    GLC 94 09/06/2022     GLC 99 03/07/2021    BUN 22.5 (H) 03/17/2025    BUN 22 09/06/2022    BUN 12 03/07/2021    CR 0.84 03/17/2025    CR 0.62 03/07/2021    GFRESTIMATED 84 03/17/2025    GFRESTIMATED >90 03/07/2021    GFRESTBLACK >90 03/07/2021    EVITA 10.2 03/17/2025    EVITA 9.4 03/07/2021      A1C RESULTS:  Lab Results   Component Value Date    A1C 5.6 02/12/2025    A1C 5.7 (H) 02/13/2020         Thank you for allowing me to participate in the care of your patient.      Sincerely,     Fabian Reddy NP     [facility]  cc:   Fabian Reddy NP  [address] [id number]

## 2025-03-18 NOTE — TELEPHONE ENCOUNTER
Called and left detailed message for verbal orders on confidential voice mail and to please send the orders by fax to sign.    Isabella Salinas RN on 3/18/2025 at 11:38 AM

## 2025-03-20 ENCOUNTER — TELEPHONE (OUTPATIENT)
Dept: GASTROENTEROLOGY | Facility: CLINIC | Age: 50
End: 2025-03-20
Payer: COMMERCIAL

## 2025-03-20 ENCOUNTER — PATIENT OUTREACH (OUTPATIENT)
Dept: CARDIOLOGY | Facility: CLINIC | Age: 50
End: 2025-03-20
Payer: COMMERCIAL

## 2025-03-20 ENCOUNTER — MYC MEDICAL ADVICE (OUTPATIENT)
Dept: CARDIOLOGY | Facility: CLINIC | Age: 50
End: 2025-03-20
Payer: COMMERCIAL

## 2025-03-20 NOTE — TELEPHONE ENCOUNTER
Patient confirmed scheduled appointment:     Date: 7/2/25  Time: 3:30 pm  Appointment Type: Return Liver  Provider: Dr. Cyndie Joyner  Location: [place]  Testing/imaging: lab & US  Additional Notes:

## 2025-03-20 NOTE — PROGRESS NOTES
[facility]: Heart Failure Care Coordination   Documentation    Situation/Background:      3/17/25 Follow up appt with Edgardo Reddy.  Bess states that her weight on her home scale on 3/3 was reportedly down ultimately to a level of 256 lbs then went up to 265 lbs the following week, and trended up further today to a peak of 269 pounds which is what we got in the clinic today.     Recommend she increase dose of Bumex from 6 mg once daily up to 8 mg once daily x 1 week with a repeat BMP in about 1 week.  - Will review with the [facility] nurse team and plan to reach out to her later this week for reassessment of her weight and symptoms after a few days with the higher dose of Bumex.  If her weight is not trending down and her swelling has not improved, then we reviewed that we could consider an IV diuretic infusion clinic visit next week for more aggressive diuresis to help prevent hospitalization for HFpEF and right-sided heart failure.      Assessment:      3/20 Left  and sent follow up [employer] message today to follow up on increased Bumex. Her responses were:    1) Her weight today 262.5 lbs    2) Open leg wound again Monday 17, Tuesday didn t take pills (needed to keep leg up and pressure to stop lymphodema leakage) 19th started the 8mg Bumex.    3) Her breathing feels good. No extra oxygen beyond what already have.    4) no significant swelling beyond normal.    5) no other concerns at this time. Over the counter products starting to help with constipation.     Plan: Bess's weight is trending down. We will check in on Bess on Monday of next week to reassess weight and symptoms since yesterday was her first dose of increased Bumex and her weight is notably less than at her follow up appt. Follow up next week if infusion is necessary.     Hailey MIGUEL, RN  1:39 PM 3/20/2025  Nurse line: [facility] 8am-4p [phone]

## 2025-03-22 ENCOUNTER — HOSPITAL ENCOUNTER (INPATIENT)
Facility: HOSPITAL | Age: 50
DRG: 871 | End: 2025-03-22
Attending: EMERGENCY MEDICINE | Admitting: HOSPITALIST
Payer: COMMERCIAL

## 2025-03-22 ENCOUNTER — APPOINTMENT (OUTPATIENT)
Dept: RADIOLOGY | Facility: HOSPITAL | Age: 50
DRG: 871 | End: 2025-03-22
Attending: EMERGENCY MEDICINE
Payer: COMMERCIAL

## 2025-03-22 ENCOUNTER — TELEPHONE (OUTPATIENT)
Dept: NURSING | Facility: CLINIC | Age: 50
End: 2025-03-22
Payer: COMMERCIAL

## 2025-03-22 ENCOUNTER — APPOINTMENT (OUTPATIENT)
Dept: CT IMAGING | Facility: HOSPITAL | Age: 50
DRG: 871 | End: 2025-03-22
Attending: EMERGENCY MEDICINE
Payer: COMMERCIAL

## 2025-03-22 DIAGNOSIS — R06.02 SHORTNESS OF BREATH: ICD-10-CM

## 2025-03-22 DIAGNOSIS — I89.0 LYMPHEDEMA OF BOTH LOWER EXTREMITIES: Primary | ICD-10-CM

## 2025-03-22 DIAGNOSIS — E66.813 CLASS 3 SEVERE OBESITY DUE TO EXCESS CALORIES WITH SERIOUS COMORBIDITY AND BODY MASS INDEX (BMI) OF 50.0 TO 59.9 IN ADULT (H): ICD-10-CM

## 2025-03-22 DIAGNOSIS — I27.20 PULMONARY HYPERTENSION (H): ICD-10-CM

## 2025-03-22 DIAGNOSIS — J96.21 ACUTE AND CHRONIC RESPIRATORY FAILURE WITH HYPOXIA (H): ICD-10-CM

## 2025-03-22 DIAGNOSIS — J96.22 ACUTE ON CHRONIC RESPIRATORY FAILURE WITH HYPOXIA AND HYPERCAPNIA (H): ICD-10-CM

## 2025-03-22 DIAGNOSIS — E66.01 CLASS 3 SEVERE OBESITY DUE TO EXCESS CALORIES WITH SERIOUS COMORBIDITY AND BODY MASS INDEX (BMI) OF 50.0 TO 59.9 IN ADULT (H): ICD-10-CM

## 2025-03-22 DIAGNOSIS — I50.32 CHRONIC HEART FAILURE WITH PRESERVED EJECTION FRACTION (H): ICD-10-CM

## 2025-03-22 DIAGNOSIS — E66.2 OBESITY HYPOVENTILATION SYNDROME (H): ICD-10-CM

## 2025-03-22 DIAGNOSIS — J96.01 ACUTE RESPIRATORY FAILURE WITH HYPOXIA (H): ICD-10-CM

## 2025-03-22 DIAGNOSIS — N10 PYELONEPHRITIS, ACUTE: ICD-10-CM

## 2025-03-22 DIAGNOSIS — N30.90 CYSTITIS: ICD-10-CM

## 2025-03-22 DIAGNOSIS — J96.21 ACUTE ON CHRONIC RESPIRATORY FAILURE WITH HYPOXIA AND HYPERCAPNIA (H): ICD-10-CM

## 2025-03-22 LAB
ALBUMIN SERPL BCG-MCNC: 4.1 G/DL (ref 3.5–5.2)
ALBUMIN UR-MCNC: 10 MG/DL
ALP SERPL-CCNC: 89 U/L (ref 40–150)
ALT SERPL W P-5'-P-CCNC: 11 U/L (ref 0–50)
ANION GAP SERPL CALCULATED.3IONS-SCNC: 9 MMOL/L (ref 7–15)
APPEARANCE UR: ABNORMAL
AST SERPL W P-5'-P-CCNC: 23 U/L (ref 0–45)
BACTERIA #/AREA URNS HPF: ABNORMAL /HPF
BASOPHILS # BLD AUTO: 0.1 10E3/UL (ref 0–0.2)
BASOPHILS NFR BLD AUTO: 1 %
BILIRUB SERPL-MCNC: 2.6 MG/DL
BILIRUB UR QL STRIP: NEGATIVE
BUN SERPL-MCNC: 23.3 MG/DL (ref 6–20)
CALCIUM SERPL-MCNC: 10.1 MG/DL (ref 8.8–10.4)
CHLORIDE SERPL-SCNC: 97 MMOL/L (ref 98–107)
COLOR UR AUTO: YELLOW
CREAT SERPL-MCNC: 0.89 MG/DL (ref 0.51–0.95)
EGFRCR SERPLBLD CKD-EPI 2021: 79 ML/MIN/1.73M2
EOSINOPHIL # BLD AUTO: 0.1 10E3/UL (ref 0–0.7)
EOSINOPHIL NFR BLD AUTO: 1 %
ERYTHROCYTE [DISTWIDTH] IN BLOOD BY AUTOMATED COUNT: 17.1 % (ref 10–15)
GLUCOSE SERPL-MCNC: 103 MG/DL (ref 70–99)
GLUCOSE UR STRIP-MCNC: 500 MG/DL
HCO3 SERPL-SCNC: 31 MMOL/L (ref 22–29)
HCT VFR BLD AUTO: 49.1 % (ref 35–47)
HGB BLD-MCNC: 15.7 G/DL (ref 11.7–15.7)
HGB UR QL STRIP: ABNORMAL
HOLD SPECIMEN: NORMAL
HYALINE CASTS: 3 /LPF
IMM GRANULOCYTES # BLD: 0.1 10E3/UL
IMM GRANULOCYTES NFR BLD: 0 %
KETONES UR STRIP-MCNC: NEGATIVE MG/DL
LACTATE SERPL-SCNC: 1.7 MMOL/L (ref 0.7–2)
LEUKOCYTE ESTERASE UR QL STRIP: ABNORMAL
LYMPHOCYTES # BLD AUTO: 0.7 10E3/UL (ref 0.8–5.3)
LYMPHOCYTES NFR BLD AUTO: 6 %
MCH RBC QN AUTO: 28.3 PG (ref 26.5–33)
MCHC RBC AUTO-ENTMCNC: 32 G/DL (ref 31.5–36.5)
MCV RBC AUTO: 89 FL (ref 78–100)
MONOCYTES # BLD AUTO: 0.4 10E3/UL (ref 0–1.3)
MONOCYTES NFR BLD AUTO: 3 %
MUCOUS THREADS #/AREA URNS LPF: PRESENT /LPF
NEUTROPHILS # BLD AUTO: 10.6 10E3/UL (ref 1.6–8.3)
NEUTROPHILS NFR BLD AUTO: 90 %
NITRATE UR QL: POSITIVE
NRBC # BLD AUTO: 0 10E3/UL
NRBC BLD AUTO-RTO: 0 /100
PH UR STRIP: 6.5 [PH] (ref 5–7)
PLATELET # BLD AUTO: 196 10E3/UL (ref 150–450)
POTASSIUM SERPL-SCNC: 4.1 MMOL/L (ref 3.4–5.3)
PROT SERPL-MCNC: 8.5 G/DL (ref 6.4–8.3)
RBC # BLD AUTO: 5.55 10E6/UL (ref 3.8–5.2)
RBC URINE: 7 /HPF
SODIUM SERPL-SCNC: 137 MMOL/L (ref 135–145)
SP GR UR STRIP: 1.01 (ref 1–1.03)
SQUAMOUS EPITHELIAL: 1 /HPF
TROPONIN T SERPL HS-MCNC: 21 NG/L
UROBILINOGEN UR STRIP-MCNC: <2 MG/DL
WBC # BLD AUTO: 11.8 10E3/UL (ref 4–11)
WBC CLUMPS #/AREA URNS HPF: PRESENT /HPF
WBC URINE: >182 /HPF

## 2025-03-22 PROCEDURE — 83605 ASSAY OF LACTIC ACID: CPT | Performed by: EMERGENCY MEDICINE

## 2025-03-22 PROCEDURE — 250N000011 HC RX IP 250 OP 636: Performed by: EMERGENCY MEDICINE

## 2025-03-22 PROCEDURE — 74177 CT ABD & PELVIS W/CONTRAST: CPT

## 2025-03-22 PROCEDURE — 81001 URINALYSIS AUTO W/SCOPE: CPT | Performed by: EMERGENCY MEDICINE

## 2025-03-22 PROCEDURE — 36415 COLL VENOUS BLD VENIPUNCTURE: CPT | Performed by: EMERGENCY MEDICINE

## 2025-03-22 PROCEDURE — 96375 TX/PRO/DX INJ NEW DRUG ADDON: CPT

## 2025-03-22 PROCEDURE — 99285 EMERGENCY DEPT VISIT HI MDM: CPT | Mod: 25

## 2025-03-22 PROCEDURE — 85025 COMPLETE CBC W/AUTO DIFF WBC: CPT | Performed by: EMERGENCY MEDICINE

## 2025-03-22 PROCEDURE — 84484 ASSAY OF TROPONIN QUANT: CPT | Performed by: EMERGENCY MEDICINE

## 2025-03-22 PROCEDURE — 87186 SC STD MICRODIL/AGAR DIL: CPT | Performed by: EMERGENCY MEDICINE

## 2025-03-22 PROCEDURE — 36415 COLL VENOUS BLD VENIPUNCTURE: CPT | Performed by: STUDENT IN AN ORGANIZED HEALTH CARE EDUCATION/TRAINING PROGRAM

## 2025-03-22 PROCEDURE — 83735 ASSAY OF MAGNESIUM: CPT | Performed by: EMERGENCY MEDICINE

## 2025-03-22 PROCEDURE — 93005 ELECTROCARDIOGRAM TRACING: CPT | Performed by: EMERGENCY MEDICINE

## 2025-03-22 PROCEDURE — 71045 X-RAY EXAM CHEST 1 VIEW: CPT

## 2025-03-22 PROCEDURE — 80053 COMPREHEN METABOLIC PANEL: CPT | Performed by: EMERGENCY MEDICINE

## 2025-03-22 PROCEDURE — 83880 ASSAY OF NATRIURETIC PEPTIDE: CPT | Performed by: EMERGENCY MEDICINE

## 2025-03-22 PROCEDURE — 85379 FIBRIN DEGRADATION QUANT: CPT | Performed by: HOSPITALIST

## 2025-03-22 PROCEDURE — 96365 THER/PROPH/DIAG IV INF INIT: CPT

## 2025-03-22 RX ORDER — IOPAMIDOL 755 MG/ML
90 INJECTION, SOLUTION INTRAVASCULAR ONCE
Status: COMPLETED | OUTPATIENT
Start: 2025-03-23 | End: 2025-03-22

## 2025-03-22 RX ORDER — AMOXICILLIN 250 MG
1 CAPSULE ORAL 2 TIMES DAILY PRN
COMMUNITY
End: 2025-04-01

## 2025-03-22 RX ORDER — CEFTRIAXONE 1 G/1
1 INJECTION, POWDER, FOR SOLUTION INTRAMUSCULAR; INTRAVENOUS ONCE
Status: COMPLETED | OUTPATIENT
Start: 2025-03-22 | End: 2025-03-23

## 2025-03-22 RX ORDER — SENNOSIDES 8.6 MG
1300 CAPSULE ORAL EVERY 8 HOURS PRN
Status: ON HOLD | COMMUNITY

## 2025-03-22 RX ADMIN — IOPAMIDOL 90 ML: 755 INJECTION, SOLUTION INTRAVENOUS at 23:55

## 2025-03-22 ASSESSMENT — ENCOUNTER SYMPTOMS
NAUSEA: 1
FEVER: 1
ABDOMINAL DISTENTION: 1
WOUND: 1

## 2025-03-22 ASSESSMENT — ACTIVITIES OF DAILY LIVING (ADL)
ADLS_ACUITY_SCORE: 57
ADLS_ACUITY_SCORE: 57

## 2025-03-22 NOTE — TELEPHONE ENCOUNTER
Home Care is calling regarding an established patient with [facility].  Requesting orders from: Mikala Luna  OTHER ACTION: Transferred call to answering service to page on-call provider for order over the weekend.  Is this a request for a temporary pause in the home care episode?  No    Orders Requested  UA Collection Order       Contacts       Contact Date/Time Type Contact Phone/Fax    03/22/2025 05:26 PM CDT Phone (Incoming) Audie (Home Care) [phone]     [facility]            Maeve Hidalgo RN  03/22/25 5:35 PM  [facility] Nurse Advisor

## 2025-03-23 ENCOUNTER — APPOINTMENT (OUTPATIENT)
Dept: ULTRASOUND IMAGING | Facility: HOSPITAL | Age: 50
DRG: 871 | End: 2025-03-23
Attending: HOSPITALIST
Payer: COMMERCIAL

## 2025-03-23 PROBLEM — E66.2 OBESITY HYPOVENTILATION SYNDROME (H): Status: ACTIVE | Noted: 2023-12-29

## 2025-03-23 PROBLEM — I27.20 PULMONARY HYPERTENSION (H): Status: ACTIVE | Noted: 2024-04-18

## 2025-03-23 PROBLEM — R06.02 SHORTNESS OF BREATH: Status: ACTIVE | Noted: 2025-03-23

## 2025-03-23 PROBLEM — A41.51 E. COLI SEPSIS (H): Status: ACTIVE | Noted: 2025-03-23

## 2025-03-23 PROBLEM — J81.0 ACUTE PULMONARY EDEMA (H): Status: ACTIVE | Noted: 2025-03-23

## 2025-03-23 PROBLEM — N10 PYELONEPHRITIS, ACUTE: Status: ACTIVE | Noted: 2023-12-21

## 2025-03-23 PROBLEM — N30.90 CYSTITIS: Status: ACTIVE | Noted: 2025-03-23

## 2025-03-23 PROBLEM — N39.0 RECURRENT UTI: Status: ACTIVE | Noted: 2022-05-03

## 2025-03-23 LAB
ALBUMIN SERPL BCG-MCNC: 3.5 G/DL (ref 3.5–5.2)
ALP SERPL-CCNC: 75 U/L (ref 40–150)
ALT SERPL W P-5'-P-CCNC: 8 U/L (ref 0–50)
ANION GAP SERPL CALCULATED.3IONS-SCNC: 11 MMOL/L (ref 7–15)
AST SERPL W P-5'-P-CCNC: 21 U/L (ref 0–45)
BASE EXCESS BLDV CALC-SCNC: 4.7 MMOL/L (ref -3–3)
BILIRUB DIRECT SERPL-MCNC: 0.5 MG/DL (ref 0–0.3)
BILIRUB SERPL-MCNC: 3.1 MG/DL
BUN SERPL-MCNC: 22.9 MG/DL (ref 6–20)
CALCIUM SERPL-MCNC: 9.6 MG/DL (ref 8.8–10.4)
CHLORIDE SERPL-SCNC: 101 MMOL/L (ref 98–107)
CREAT SERPL-MCNC: 1.07 MG/DL (ref 0.51–0.95)
CRP SERPL-MCNC: 19.3 MG/L
D DIMER PPP FEU-MCNC: 0.51 UG/ML FEU (ref 0–0.5)
EGFRCR SERPLBLD CKD-EPI 2021: 63 ML/MIN/1.73M2
ERYTHROCYTE [DISTWIDTH] IN BLOOD BY AUTOMATED COUNT: 17.5 % (ref 10–15)
GLUCOSE SERPL-MCNC: 103 MG/DL (ref 70–99)
HCO3 BLDV-SCNC: 30 MMOL/L (ref 21–28)
HCO3 SERPL-SCNC: 26 MMOL/L (ref 22–29)
HCT VFR BLD AUTO: 45.5 % (ref 35–47)
HGB BLD-MCNC: 14.6 G/DL (ref 11.7–15.7)
INR PPP: 1.3 (ref 0.85–1.15)
MAGNESIUM SERPL-MCNC: 2.4 MG/DL (ref 1.7–2.3)
MAGNESIUM SERPL-MCNC: 2.4 MG/DL (ref 1.7–2.3)
MAGNESIUM SERPL-MCNC: 2.6 MG/DL (ref 1.7–2.3)
MCH RBC QN AUTO: 29.6 PG (ref 26.5–33)
MCHC RBC AUTO-ENTMCNC: 32.1 G/DL (ref 31.5–36.5)
MCV RBC AUTO: 92 FL (ref 78–100)
NT-PROBNP SERPL-MCNC: 5125 PG/ML (ref 0–900)
O2/TOTAL GAS SETTING VFR VENT: 32 %
OXYHGB MFR BLDV: 60 % (ref 70–75)
PCO2 BLDV: 44 MM HG (ref 40–50)
PH BLDV: 7.44 [PH] (ref 7.32–7.43)
PLATELET # BLD AUTO: 134 10E3/UL (ref 150–450)
PO2 BLDV: 32 MM HG (ref 25–47)
POTASSIUM SERPL-SCNC: 4 MMOL/L (ref 3.4–5.3)
PROCALCITONIN SERPL IA-MCNC: 0.27 NG/ML
PROT SERPL-MCNC: 7.4 G/DL (ref 6.4–8.3)
RBC # BLD AUTO: 4.94 10E6/UL (ref 3.8–5.2)
SAO2 % BLDV: 60.9 % (ref 70–75)
SODIUM SERPL-SCNC: 138 MMOL/L (ref 135–145)
TROPONIN T SERPL HS-MCNC: 30 NG/L
WBC # BLD AUTO: 12 10E3/UL (ref 4–11)

## 2025-03-23 PROCEDURE — 99222 1ST HOSP IP/OBS MODERATE 55: CPT | Performed by: INTERNAL MEDICINE

## 2025-03-23 PROCEDURE — 5A09357 ASSISTANCE WITH RESPIRATORY VENTILATION, LESS THAN 24 CONSECUTIVE HOURS, CONTINUOUS POSITIVE AIRWAY PRESSURE: ICD-10-PCS | Performed by: HOSPITALIST

## 2025-03-23 PROCEDURE — 250N000011 HC RX IP 250 OP 636: Mod: JZ | Performed by: EMERGENCY MEDICINE

## 2025-03-23 PROCEDURE — 85610 PROTHROMBIN TIME: CPT | Performed by: HOSPITALIST

## 2025-03-23 PROCEDURE — 86140 C-REACTIVE PROTEIN: CPT | Performed by: HOSPITALIST

## 2025-03-23 PROCEDURE — 82248 BILIRUBIN DIRECT: CPT | Performed by: HOSPITALIST

## 2025-03-23 PROCEDURE — 84145 PROCALCITONIN (PCT): CPT | Performed by: HOSPITALIST

## 2025-03-23 PROCEDURE — 999N000157 HC STATISTIC RCP TIME EA 10 MIN

## 2025-03-23 PROCEDURE — 99223 1ST HOSP IP/OBS HIGH 75: CPT | Performed by: HOSPITALIST

## 2025-03-23 PROCEDURE — 87040 BLOOD CULTURE FOR BACTERIA: CPT | Performed by: EMERGENCY MEDICINE

## 2025-03-23 PROCEDURE — 83735 ASSAY OF MAGNESIUM: CPT | Performed by: HOSPITALIST

## 2025-03-23 PROCEDURE — 36415 COLL VENOUS BLD VENIPUNCTURE: CPT | Performed by: EMERGENCY MEDICINE

## 2025-03-23 PROCEDURE — 36415 COLL VENOUS BLD VENIPUNCTURE: CPT | Performed by: HOSPITALIST

## 2025-03-23 PROCEDURE — 93970 EXTREMITY STUDY: CPT

## 2025-03-23 PROCEDURE — 250N000011 HC RX IP 250 OP 636: Performed by: HOSPITALIST

## 2025-03-23 PROCEDURE — 250N000013 HC RX MED GY IP 250 OP 250 PS 637: Performed by: INTERNAL MEDICINE

## 2025-03-23 PROCEDURE — 94660 CPAP INITIATION&MGMT: CPT

## 2025-03-23 PROCEDURE — 999N000185 HC STATISTIC TRANSPORT TIME EA 15 MIN

## 2025-03-23 PROCEDURE — 80053 COMPREHEN METABOLIC PANEL: CPT | Performed by: HOSPITALIST

## 2025-03-23 PROCEDURE — 120N000001 HC R&B MED SURG/OB

## 2025-03-23 PROCEDURE — 250N000013 HC RX MED GY IP 250 OP 250 PS 637: Performed by: EMERGENCY MEDICINE

## 2025-03-23 PROCEDURE — 85027 COMPLETE CBC AUTOMATED: CPT | Performed by: HOSPITALIST

## 2025-03-23 PROCEDURE — 82805 BLOOD GASES W/O2 SATURATION: CPT | Performed by: EMERGENCY MEDICINE

## 2025-03-23 PROCEDURE — 84484 ASSAY OF TROPONIN QUANT: CPT | Performed by: EMERGENCY MEDICINE

## 2025-03-23 PROCEDURE — 250N000013 HC RX MED GY IP 250 OP 250 PS 637: Performed by: HOSPITALIST

## 2025-03-23 RX ORDER — SIMVASTATIN 10 MG
10 TABLET ORAL AT BEDTIME
Status: DISCONTINUED | OUTPATIENT
Start: 2025-03-23 | End: 2025-03-24

## 2025-03-23 RX ORDER — BUMETANIDE 0.25 MG/ML
2 INJECTION, SOLUTION INTRAMUSCULAR; INTRAVENOUS ONCE
Status: COMPLETED | OUTPATIENT
Start: 2025-03-23 | End: 2025-03-23

## 2025-03-23 RX ORDER — NALOXONE HYDROCHLORIDE 0.4 MG/ML
0.4 INJECTION, SOLUTION INTRAMUSCULAR; INTRAVENOUS; SUBCUTANEOUS
Status: DISCONTINUED | OUTPATIENT
Start: 2025-03-23 | End: 2025-03-24

## 2025-03-23 RX ORDER — CEFTRIAXONE 1 G/1
1 INJECTION, POWDER, FOR SOLUTION INTRAMUSCULAR; INTRAVENOUS EVERY 24 HOURS
Status: DISCONTINUED | OUTPATIENT
Start: 2025-03-23 | End: 2025-03-23 | Stop reason: DRUGHIGH

## 2025-03-23 RX ORDER — CALCIUM CARBONATE 500 MG/1
1000 TABLET, CHEWABLE ORAL 4 TIMES DAILY PRN
Status: DISCONTINUED | OUTPATIENT
Start: 2025-03-23 | End: 2025-03-24

## 2025-03-23 RX ORDER — ESCITALOPRAM OXALATE 5 MG/1
10 TABLET ORAL DAILY
Status: DISCONTINUED | OUTPATIENT
Start: 2025-03-23 | End: 2025-03-24

## 2025-03-23 RX ORDER — ACETAMINOPHEN 650 MG/1
650 SUPPOSITORY RECTAL EVERY 4 HOURS PRN
Status: DISCONTINUED | OUTPATIENT
Start: 2025-03-23 | End: 2025-03-23

## 2025-03-23 RX ORDER — PIPERACILLIN SODIUM, TAZOBACTAM SODIUM 3; .375 G/15ML; G/15ML
3.38 INJECTION, POWDER, LYOPHILIZED, FOR SOLUTION INTRAVENOUS EVERY 8 HOURS
Status: DISCONTINUED | OUTPATIENT
Start: 2025-03-23 | End: 2025-03-24

## 2025-03-23 RX ORDER — HYDROMORPHONE HCL IN WATER/PF 6 MG/30 ML
0.4 PATIENT CONTROLLED ANALGESIA SYRINGE INTRAVENOUS
Status: DISCONTINUED | OUTPATIENT
Start: 2025-03-23 | End: 2025-03-24

## 2025-03-23 RX ORDER — CEFTRIAXONE 2 G/1
2 INJECTION, POWDER, FOR SOLUTION INTRAMUSCULAR; INTRAVENOUS EVERY 24 HOURS
Status: DISCONTINUED | OUTPATIENT
Start: 2025-03-23 | End: 2025-03-23

## 2025-03-23 RX ORDER — ACETAMINOPHEN 325 MG/1
650 TABLET ORAL ONCE
Status: COMPLETED | OUTPATIENT
Start: 2025-03-23 | End: 2025-03-23

## 2025-03-23 RX ORDER — HYDRALAZINE HYDROCHLORIDE 10 MG/1
10 TABLET, FILM COATED ORAL EVERY 4 HOURS PRN
Status: DISCONTINUED | OUTPATIENT
Start: 2025-03-23 | End: 2025-03-24

## 2025-03-23 RX ORDER — LIDOCAINE 40 MG/G
CREAM TOPICAL
Status: DISCONTINUED | OUTPATIENT
Start: 2025-03-23 | End: 2025-03-24

## 2025-03-23 RX ORDER — HYDROMORPHONE HCL IN WATER/PF 6 MG/30 ML
0.2 PATIENT CONTROLLED ANALGESIA SYRINGE INTRAVENOUS
Status: DISCONTINUED | OUTPATIENT
Start: 2025-03-23 | End: 2025-03-24

## 2025-03-23 RX ORDER — ENOXAPARIN SODIUM 100 MG/ML
40 INJECTION SUBCUTANEOUS EVERY 12 HOURS
Status: DISCONTINUED | OUTPATIENT
Start: 2025-03-23 | End: 2025-03-23

## 2025-03-23 RX ORDER — ONDANSETRON 2 MG/ML
4 INJECTION INTRAMUSCULAR; INTRAVENOUS EVERY 30 MIN PRN
Status: DISCONTINUED | OUTPATIENT
Start: 2025-03-23 | End: 2025-03-24

## 2025-03-23 RX ORDER — NALOXONE HYDROCHLORIDE 0.4 MG/ML
0.2 INJECTION, SOLUTION INTRAMUSCULAR; INTRAVENOUS; SUBCUTANEOUS
Status: DISCONTINUED | OUTPATIENT
Start: 2025-03-23 | End: 2025-03-24

## 2025-03-23 RX ORDER — ACETAMINOPHEN 650 MG/1
650 SUPPOSITORY RECTAL EVERY 4 HOURS PRN
Status: DISCONTINUED | OUTPATIENT
Start: 2025-03-23 | End: 2025-03-24

## 2025-03-23 RX ORDER — AMOXICILLIN 250 MG
2 CAPSULE ORAL 2 TIMES DAILY PRN
Status: DISCONTINUED | OUTPATIENT
Start: 2025-03-23 | End: 2025-03-24

## 2025-03-23 RX ORDER — ACETAMINOPHEN 325 MG/1
650 TABLET ORAL EVERY 4 HOURS PRN
Status: DISCONTINUED | OUTPATIENT
Start: 2025-03-23 | End: 2025-03-24

## 2025-03-23 RX ORDER — SPIRONOLACTONE 25 MG
12.5 TABLET ORAL DAILY
Status: DISCONTINUED | OUTPATIENT
Start: 2025-03-23 | End: 2025-03-24

## 2025-03-23 RX ORDER — SENNOSIDES 8.6 MG
650 CAPSULE ORAL EVERY 8 HOURS PRN
Status: DISCONTINUED | OUTPATIENT
Start: 2025-03-23 | End: 2025-03-23

## 2025-03-23 RX ORDER — HYDRALAZINE HYDROCHLORIDE 20 MG/ML
10 INJECTION INTRAMUSCULAR; INTRAVENOUS EVERY 4 HOURS PRN
Status: DISCONTINUED | OUTPATIENT
Start: 2025-03-23 | End: 2025-03-24

## 2025-03-23 RX ORDER — ONDANSETRON 4 MG/1
4 TABLET, ORALLY DISINTEGRATING ORAL EVERY 6 HOURS PRN
Status: DISCONTINUED | OUTPATIENT
Start: 2025-03-23 | End: 2025-03-24

## 2025-03-23 RX ORDER — BUMETANIDE 0.25 MG/ML
2 INJECTION, SOLUTION INTRAMUSCULAR; INTRAVENOUS EVERY 8 HOURS
Status: DISCONTINUED | OUTPATIENT
Start: 2025-03-23 | End: 2025-03-24

## 2025-03-23 RX ORDER — GUAIFENESIN 200 MG/10ML
200 LIQUID ORAL EVERY 4 HOURS PRN
Status: DISCONTINUED | OUTPATIENT
Start: 2025-03-23 | End: 2025-03-24

## 2025-03-23 RX ORDER — ONDANSETRON 2 MG/ML
4 INJECTION INTRAMUSCULAR; INTRAVENOUS EVERY 6 HOURS PRN
Status: DISCONTINUED | OUTPATIENT
Start: 2025-03-23 | End: 2025-03-24

## 2025-03-23 RX ORDER — AMOXICILLIN 250 MG
1 CAPSULE ORAL 2 TIMES DAILY PRN
Status: DISCONTINUED | OUTPATIENT
Start: 2025-03-23 | End: 2025-03-24

## 2025-03-23 RX ORDER — PIPERACILLIN SODIUM, TAZOBACTAM SODIUM 3; .375 G/15ML; G/15ML
3.38 INJECTION, POWDER, LYOPHILIZED, FOR SOLUTION INTRAVENOUS ONCE
Status: COMPLETED | OUTPATIENT
Start: 2025-03-23 | End: 2025-03-23

## 2025-03-23 RX ORDER — ACETAMINOPHEN 325 MG/1
650 TABLET ORAL EVERY 4 HOURS PRN
Status: DISCONTINUED | OUTPATIENT
Start: 2025-03-23 | End: 2025-03-23

## 2025-03-23 RX ORDER — KETOROLAC TROMETHAMINE 15 MG/ML
15 INJECTION, SOLUTION INTRAMUSCULAR; INTRAVENOUS ONCE
Status: COMPLETED | OUTPATIENT
Start: 2025-03-23 | End: 2025-03-23

## 2025-03-23 RX ADMIN — APIXABAN 5 MG: 5 TABLET, FILM COATED ORAL at 21:23

## 2025-03-23 RX ADMIN — POTASSIUM CHLORIDE 30 MEQ: 1500 TABLET, EXTENDED RELEASE ORAL at 09:13

## 2025-03-23 RX ADMIN — SIMVASTATIN 10 MG: 10 TABLET, FILM COATED ORAL at 21:23

## 2025-03-23 RX ADMIN — KETOROLAC TROMETHAMINE 15 MG: 15 INJECTION, SOLUTION INTRAMUSCULAR; INTRAVENOUS at 01:31

## 2025-03-23 RX ADMIN — PIPERACILLIN AND TAZOBACTAM 3.38 G: 3; .375 INJECTION, POWDER, FOR SOLUTION INTRAVENOUS at 09:07

## 2025-03-23 RX ADMIN — CEFTRIAXONE SODIUM 1 G: 1 INJECTION, POWDER, FOR SOLUTION INTRAMUSCULAR; INTRAVENOUS at 00:47

## 2025-03-23 RX ADMIN — ESCITALOPRAM 10 MG: 5 TABLET, FILM COATED ORAL at 09:15

## 2025-03-23 RX ADMIN — ENOXAPARIN SODIUM 40 MG: 40 INJECTION SUBCUTANEOUS at 09:12

## 2025-03-23 RX ADMIN — PIPERACILLIN AND TAZOBACTAM 3.38 G: 3; .375 INJECTION, POWDER, FOR SOLUTION INTRAVENOUS at 02:47

## 2025-03-23 RX ADMIN — PIPERACILLIN AND TAZOBACTAM 3.38 G: 3; .375 INJECTION, POWDER, FOR SOLUTION INTRAVENOUS at 17:13

## 2025-03-23 RX ADMIN — EMPAGLIFLOZIN 10 MG: 10 TABLET, FILM COATED ORAL at 09:14

## 2025-03-23 RX ADMIN — ACETAMINOPHEN 650 MG: 325 TABLET ORAL at 01:31

## 2025-03-23 RX ADMIN — BUMETANIDE 2 MG: 0.25 INJECTION INTRAMUSCULAR; INTRAVENOUS at 00:47

## 2025-03-23 ASSESSMENT — ACTIVITIES OF DAILY LIVING (ADL)
ADLS_ACUITY_SCORE: 54
ADLS_ACUITY_SCORE: 56
ADLS_ACUITY_SCORE: 52
ADLS_ACUITY_SCORE: 57
ADLS_ACUITY_SCORE: 64
WALKING_OR_CLIMBING_STAIRS: STAIR CLIMBING DIFFICULTY, ASSISTANCE 1 PERSON
ADLS_ACUITY_SCORE: 64
ADLS_ACUITY_SCORE: 64
ADLS_ACUITY_SCORE: 56
ADLS_ACUITY_SCORE: 64
ADLS_ACUITY_SCORE: 64
ADLS_ACUITY_SCORE: 52
ADLS_ACUITY_SCORE: 56
ADLS_ACUITY_SCORE: 64
ADLS_ACUITY_SCORE: 56
ADLS_ACUITY_SCORE: 64
ADLS_ACUITY_SCORE: 52
ADLS_ACUITY_SCORE: 56
NUMBER_OF_TIMES_PATIENT_HAS_FALLEN_WITHIN_LAST_SIX_MONTHS: 1
ADLS_ACUITY_SCORE: 57
FALL_HISTORY_WITHIN_LAST_SIX_MONTHS: YES
ADLS_ACUITY_SCORE: 54
ADLS_ACUITY_SCORE: 56
ADLS_ACUITY_SCORE: 57
ADLS_ACUITY_SCORE: 64
WALKING_OR_CLIMBING_STAIRS_DIFFICULTY: YES
ADLS_ACUITY_SCORE: 57

## 2025-03-23 NOTE — H&P
[facility]    History and Physical - Hospitalist Service       Date of Admission:  3/22/2025    Assessment & Plan      Bess Enamorado is a 49 year old female admitted on 3/22/2025.     Principal Problem:    Acute on chronic respiratory failure with hypoxemia (H)  Active Problems:    Multiple sclerosis (H)    Leg edema    Class 3 severe obesity due to excess calories with serious comorbidity and body mass index (BMI) of 50.0 to 59.9 in adult (H)    Acute on chronic diastolic congestive heart failure (H)    Recurrent UTI's    Obesity hypoventilation syndrome (H)    Pulmonary hypertension (H)    Shortness of breath    Acute pulmonary edema (H)    Cystitis    E. coli sepsis (H)    49 year old female with a past medical history of multiple sclerosis, HTN, anxiety, depression, cellulitis, CHF, who presents with dysuria. + Fever/hematuria, baseline O2 3L NC with SOB, mobility issues The patient reports she may have a urinary tract infection given her urine was cloudy. She is also concerned for sepsis because the wound on the left lower extremity is draining and she is having a fever. She was just discharged home from TCU as of recent. The patient notes she is normally able to ambulate, but she has been having increase difficulty in getting around. lABS - WBC HIGH, BNP high and UA+ for UTI  CXR-Somewhat low lung volumes. Elevation of the right hemidiaphragm. Enlarged heart. Prominent pulmonary vasculature suggestive of congestion. No focal lung consolidation, pleural effusion or pneumothorax.   spoke with Dr. Bates, Cardiology. Discussed case and lab/imaging results thus far. Elevated troponin is likely due to fluid overload.    CT-IMPRESSION:   1.  No acute findings or inflammatory changes in the abdomen or pelvis.  2.  Mildly lobulated hepatic surface contour, suspicious for chronic hepatocellular disease.  Clinically she is saturating well on BiPAP now placed in the ED, tachycardic tachypneic with  soft tissue BP but does report good urine output with Bumex.  She has this chronic lymphedema in the legs.  She is fairly alert and reports improvement.  She has significant diuretics at home and will tailor her diuretics further based on her kidney function and clinical response.  She tentatively has been placed on Bumex every 8 hours history was taking significant Bumex at home as well.  Febrile 101 and hypertensive tachycardic to suggest mild fluid overload.  - IV antibiotics- empirically  given rocephin but will broaden to IV Zosyn, due to previously seen Enterococcus to cover broad-spectrum coverage for gram-positive negatives and anaerobes while  culture and sensitivities are pending        Cystitis with severe sepsis possibly E. coli sepsis (H)(in the past has grown E. coli in the urine as well as Enterococcus bacteremia)  -Signs of end organ damage suggest from- resp failure -acute on chronic, Bilirubin>2, demand ishemia  due to sepsis  -Pt has two or more of criteria of SIRS- tachycardia ( >90, tachypnea  fever due to Sepsis due to UTI leading to the onset of SIRS.  -no further bolus IV fluids due to risk of progression of CHF, lactate has been ordered  - Obtain Microbiological studies-  Blood cultures and lactates has been obtained  - IV antibiotics- empirically  given rocephin but will broaden to IV Zosyn, due to previously seen Enterococcus to cover broad-spectrum coverage for gram-positive negatives and anaerobes while  culture and sensitivities are pending  - early goal directed therapy to maintain Adequate UO, CVP and MAP>65 in range  - trend LFT      Acute on chronic respiratory failure with hypoxemia (H)    Class 3 severe obesity due to excess calories with serious comorbidity and body mass index (BMI) of 50.0 to 59.9 in adult (H) Obesity hypoventilation syndrome (H)  Pulm HTN, with chronic hypoxia , uses 3 liters at home, suggested on scans    Shortness of breath    Acute pulmonary edema  (H)  multifactorial acute resp failure , baseline 3 L NC now needing bipap  -due to sepsis, obesity hypoventilation and Acute diastolic CHF as evidenced by cxr luid overload.    Plan to admit to inpatient and provide treatment with the diuretics, with antibiotic.  ACS assessed with troponin and EKG. Thromboembolism/PE is less likely with this alternative etiology although will assess with venous duplex of bilateral lower extremity for  dvt intermediate probability.  -she follows with pulm here,        Acute on chronic diastolic congestive heart failure (H)  Type 2 MI/Demand Ischemia due to Heart failure /sepsis   patient has elevated BNP, fine rales on examination , Unresolved dyspnea, weight gain and chest x-ray suggest congestive heart failure likely consistent with the possible acute heart failure  -  echocardiogram with recent normal EF  Mid range  with SGLT2 I- Jardiance/Fraxiga  Provide IV diuresis-bumex 2 mg TIDID, monitor creat and K &Mag with it and will adjust accordingly.  Monitor inputs outputs and daily weights.  Monitor on telemetry.  Sodium and fluid restriction- 2g Na and *1500 c fluid restriction with adjustment as needed for cardiorenal balance.        Leg edema with  chronic leg lymphedema's  - chronic, check venous duplex of bilateral lower extremity for  dvt    Mood d/o  - Lexapro, will continue          Initial orders were placed.  Request consultation to cardiology-appreciated assistance and recommendations. .  Anticipated length of stay of more than 2 midnights of hospitalization depending on progression, planning,findings,further testing or treatment needs. Services are medically necessary for evaluation and treatment of the acute & on chronic superimposed conditions with the treatment plan as outlined above.  Current problem list also has been updated.          Diet: Combination Diet Regular Diet Adult; 2 gm NA Diet  Fluid restriction 1500 ML FLUID    DVT Prophylaxis: Enoxaparin  "(Lovenox) SQ  Brar Catheter: Not present  Lines: None     Cardiac Monitoring: ACTIVE order. Indication: Acute decompensated heart failure (48 hours)  Code Status: Full Code      Clinically Significant Risk Factors Present on Admission          # Hypochloremia: Lowest Cl = 97 mmol/L in last 2 days, will monitor as appropriate        # Drug Induced Platelet Defect: home medication list includes an antiplatelet medication   # Hypertension: Noted on problem list    # Acute heart failure with preserved ejection fraction: heart failure noted on problem list, last echo with EF >50%, and receiving IV diuretics          # Severe Obesity: Estimated body mass index is 48.41 kg/m  as calculated from the following:    Height as of this encounter: 1.626 m (5' 4\").    Weight as of this encounter: 127.9 kg (282 lb).         # Financial/Environmental Concerns:           Disposition Plan     Medically Ready for Discharge: Anticipated in 2-4 Days           Bro Billingsley MD  Hospitalist Service  [facility]  Securely message with [employer] (more info)  Text page via [employer] Paging/Directory     ______________________________________________________________________    Chief Complaint   sob    History is obtained from the patient, electronic health record, and emergency department physician    History of Present Illness   Bess Enamorado is a 49 year old female with a past medical history of multiple sclerosis, HTN, anxiety, depression, cellulitis, CHF, who presents with dysuria.     The patient reports she may have a urinary tract infection given her urine was cloudy. She is also concerned for sepsis because the wound on the left lower extremity is draining and she is having a fever. She is concerned of a bowel obstruction because her abdomen is bloated and she has been feeling nauseous and backed up. She has been vomiting, but notes of only phlegm coming up. She has history of cholecystectomy. Her last bowel " obstruction occurred in February, where she was eventually admitted. She was just discharged home from TCU as of recent. The patient notes she is normally able to ambulate, but she has been having increase difficulty in getting around. No complaints of any other associated symptoms.       Past Medical History    Past Medical History:   Diagnosis Date    Acute on chronic diastolic congestive heart failure (H) 02/09/2022    Arthritis 2019    Hips    ASCUS favor benign 08/2015    Neg HPV    Depressive disorder 2010    H/O colposcopy with cervical biopsy 09/14/2015    Bx & ECC - negative    Hypertension 2019    LSIL on Pap smear 07/2014    Neg high risk HPV    Multiple sclerosis (H) 08/29/2005 9/18/200pt dx with MS 2004--dx by neurolgist and is followed by Dr. Harris    Myocardial infarction (H)     Obese     Pneumonia due to infectious organism, unspecified laterality, unspecified part of lung 02/08/2022    Sepsis (Temp 101, , WBC 13.0) 10/23/2018    Shingles 10/2016    SIRS (systemic inflammatory response syndrome) (H) 03/04/2021    Sleep apnea     UNSPEC CONSTIPATION 09/18/2006 9/18/2006   Has tried otc suppository and otc lax.        Past Surgical History   Past Surgical History:   Procedure Laterality Date    CHOLECYSTECTOMY, LAPOROSCOPIC      Cholecystectomy, Laparoscopic    COLONOSCOPY  2007?    Bathroom issue..results normal    COMBINED CYSTOSCOPY, RETROGRADES, EXCHANGE STENT URETER(S) Right 2/21/2022    Procedure: CYSTOSCOPY, WITH right RETROGRADE PYELOGRAM AND right URETERAL STENT PLACEMENT;  Surgeon: Doyle Palomares MD;  [place] [facility] [place] REDUCTION INTERNAL FIXATION TOE(S)  4/13/2012    Procedure:OPEN REDUCTION INTERNAL FIXATION TOE(S); Open reduction internal fixation right proximal fifth metatarsal fracture-   Anes-choice block; Surgeon:LEY, JEFFREY DUANE; Location:[place] OR    PICC SINGLE LUMEN PLACEMENT  3/20/2024    PICC TRIPLE LUMEN PLACEMENT  2/21/2022            Prior to  Admission Medications   Prior to Admission Medications   Prescriptions Last Dose Informant Patient Reported? Taking?   Emollient (GOLD BOND MEDICATED BODY EX)  Self Yes Yes   Sig: Externally apply 1 Application topically 2 times daily as needed   acetaminophen (TYLENOL) 650 MG CR tablet   Yes Yes   Sig: Take 650 mg by mouth every 8 hours as needed for mild pain or fever.   aspirin (ASPIRIN LOW DOSE) 81 MG EC tablet 3/22/2025 Morning  No Yes   Sig: Take 1 tablet (81 mg) by mouth daily.   bumetanide (BUMEX) 2 MG tablet 3/22/2025 Morning  No Yes   Sig: Take 4 tablets (8 mg) once daily for 1 week starting on 3/17, then decrease back to to 3 tablets (6 mg) once daily.   empagliflozin (JARDIANCE) 10 MG TABS tablet 3/22/2025 Morning  No Yes   Sig: Take 1 tablet (10 mg) by mouth daily.   escitalopram (LEXAPRO) 10 MG tablet 3/22/2025 Morning  No Yes   Sig: Take 1 tablet (10 mg) by mouth daily.   miconazole (MICATIN) 2 % external powder   Yes Yes   Sig: Apply topically 2 times daily as needed for itching or other.   potassium chloride sheila ER (KLOR-CON M10) 10 MEQ CR tablet 3/22/2025 Morning  No Yes   Sig: Take 3 tablets (30 mEq) by mouth daily.   senna-docusate (SENOKOT-S/PERICOLACE) 8.6-50 MG tablet   Yes Yes   Sig: Take 1 tablet by mouth 2 times daily as needed for constipation.   simvastatin (ZOCOR) 10 MG tablet 3/21/2025 Bedtime  No Yes   Sig: Take 1 tablet (10 mg) by mouth at bedtime.   spironolactone (ALDACTONE) 25 MG tablet 3/22/2025 Morning  No Yes   Sig: Take 0.5 tablets (12.5 mg) by mouth daily.      Facility-Administered Medications: None        Review of Systems    The 5 point Review of Systems is negative other than noted in the HPI or here.   + GI symptoms  Weakness+    Social History   I have reviewed this patient's social history and updated it with pertinent information if needed.  Social History     Tobacco Use    Smoking status: Never    Smokeless tobacco: Never   Vaping Use    Vaping status: Never Used    Substance Use Topics    Alcohol use: Yes     Comment: social    Drug use: No         Family History   I have reviewed this patient's family history and updated it with pertinent information if needed.  Family History   Problem Relation Age of Onset    Neurologic Disorder Father         MS    Heart Disease Father         irregular heart rate    Diabetes Father     Melanoma Father     Sleep Apnea Father     Other Cancer Father     Cancer Maternal Grandfather         lung    Other Cancer Maternal Grandfather     Cancer Paternal Grandmother         stomach    Other Cancer Paternal Grandmother     Cancer Mother     Other Cancer Mother     Thyroid Disease Mother     Gynecology Maternal Aunt         PCOS    Hypertension Maternal Grandmother     Anxiety Disorder Maternal Grandmother     Thyroid Disease Maternal Grandmother     Anxiety Disorder Sister          Allergies   No Known Allergies     Physical Exam   Vital Signs: Temp: (!) 101.9  F (38.8  C) Temp src: Oral BP: (!) 142/65 Pulse: 107   Resp: 24 SpO2: 97 % O2 Device: Nasal cannula Oxygen Delivery: 3 LPM  Weight: 282 lbs 0 oz    Alert awake-febrile hypoxic and hypertensive, but now on BiPAP mentating well.  Vision Baseline  Neck supple  bilateral air entry +  S1-S2 normal  Abdomen is soft no tenderness  Extremities significant bilateral lymphedema in the thighs primarily with some redness there as well-  Neurologically- no new Gross deficits from baseline-  Psych-mood okay and appropriate to circumstances      Medical Decision Making       75 MINUTES SPENT BY ME on the date of service doing chart review, history, exam, documentation & further activities per the note.      Data     I have personally reviewed the following data over the past 24 hrs:    11.8 (H)  \   15.7   / 196     137 97 (L) 23.3 (H) /  103 (H)   4.1 31 (H) 0.89 \     ALT: 11 AST: 23 AP: 89 TBILI: 2.6 (H)   ALB: 4.1 TOT PROTEIN: 8.5 (H) LIPASE: N/A     Trop: 30 (H) BNP: 5,125 (H)     Procal: N/A CRP:  N/A Lactic Acid: 1.7       INR:  N/A PTT:  N/A   D-dimer:  0.51 (H) Fibrinogen:  N/A       Imaging results reviewed over the past 24 hrs:   Recent Results (from the past 24 hours)   CT Abdomen Pelvis w Contrast    Narrative    EXAM: CT ABDOMEN PELVIS W CONTRAST  LOCATION: [facility]  DATE: 3/22/2025    INDICATION: Abdominal pain, bloating, history of small bowel obstruction  COMPARISON: 7/18/2024  TECHNIQUE: CT scan of the abdomen and pelvis was performed following injection of IV contrast. Multiplanar reformats were obtained. Dose reduction techniques were used.  CONTRAST: 90ml isovue 370    FINDINGS:   LOWER CHEST: Unremarkable.    HEPATOBILIARY: Mildly lobulated hepatic surface contour. Status post cholecystectomy. No biliary dilatation.    PANCREAS: Normal.    SPLEEN: Normal.    ADRENAL GLANDS: Normal.    KIDNEYS/BLADDER: Few nonobstructing intrarenal calculi bilaterally measuring up to 0.3 cm. No hydronephrosis. Urinary bladder appears normal.    BOWEL: Moderate amount of stool throughout the colon. No obstruction or inflammatory change. Normal appendix. No evidence of acute appendicitis.    LYMPH NODES: Unchanged prominent but nonenlarged retroperitoneal lymph nodes. No new lymphadenopathy identified.    VASCULATURE: Mild atherosclerotic calcifications.    PELVIC ORGANS: Redemonstrated right ovarian dermoid cyst measuring 9.0 x 7.9 cm. Uterus and left adnexa appear unremarkable.    MUSCULOSKELETAL: Multilevel degenerative changes of the spine. Grade 1 anterolisthesis of L4 on L5.      Impression    IMPRESSION:   1.  No acute findings or inflammatory changes in the abdomen or pelvis.    2.  Mildly lobulated hepatic surface contour, suspicious for chronic hepatocellular disease.   XR Chest 1 View    Narrative    EXAM: XR CHEST 1 VIEW  LOCATION: [facility]  DATE: 3/23/2025    INDICATION: Dyspnea  COMPARISON: CT chest 2/11/2025. Chest radiographs 7/22/2024.       Impression    IMPRESSION: Somewhat low lung volumes. Elevation of the right hemidiaphragm. Enlarged heart. Prominent pulmonary vasculature suggestive of congestion. No focal lung consolidation, pleural effusion or pneumothorax.

## 2025-03-23 NOTE — ED PROVIDER NOTES
NAME: Bess Enamorado  AGE: 49 year old female  YOB: 1975  MRN: [id number]  EVALUATION DATE & TIME: 3/22/2025  9:39 PM    PCP: Mikala Luna    ED PROVIDER: Lowell Levy M.D.      Chief Complaint   Patient presents with    Dysuria     FINAL IMPRESSION:  1. Cystitis    2. Acute pulmonary edema (H)    3. Shortness of breath    4. Pyelonephritis, acute      MEDICAL DECISION MAKIN:58 PM I met with the patient, obtained history, performed an initial exam, and discussed options and plan for diagnostics and treatment here in the ED.   11:27 PM I updated the patient on her urine analysis results and discussed further plan of care.  1:33 AM I spoke with Dr. Bates, Cardiology. Discussed case and lab/imaging results thus far. Elevated troponin is likely due to fluid overload.  1:35 AM I updated the patient after consult with cardiology. She is agreeable for admission.  1:40 AM I Spoke with Dr. Billingsley, Hospitalist. We further discussed the patient's case, reviewed ED work-up so far, and they accepted the patient for admit.    Patient was clinically assessed and consented to treatment. After assessment, medical decision making and workup were discussed with the patient. The patient was agreeable to plan for testing, workup, and treatment.  Pertinent Labs & Imaging studies reviewed. (See chart for details)       Medical Decision Making  Obtained supplemental history:Supplemental history obtained?: Documented in chart  Reviewed external records: External records reviewed?: Documented in chart  Care impacted by chronic illness:Heart Disease, Hypertension, Mental Health, and multiple sclerosis  Care significantly affected by social determinants of health:Access to Medical Care  Did you consider but not order tests?: In addition to work-up documented, I considered the following work up:   Did you interpret images independently?: Independent interpretation of ECG and images noted in documentation, when  applicable.  Consultation discussion with other provider:Did you involve another provider (consultant, , pharmacy, etc.)?: I discussed the care with another health care provider, see documentation for details.  Admit.  Not Applicable    Bess Enamorado is a 49 year old female who presents with dysuria, abdominal bloating, shortness of breath.   Differential diagnosis includes but not limited to urinary tract infection, pyelonephritis, sepsis, seizures, small bowel obstruction, pneumonia, pulmonary edema, hypoxemia.  Patient is 49-year-old female with history of frequent urinary tract infections as well as lymphedema in her lower extremities.  Patient presenting for dysuria and chest treated recently a month ago for E. coli in her urine.  Patient required [facility] followed by discharge to TCU before she went home about a week and half ago.  Patient symptoms now back and I would be concerned about resistant or recurrent infections.  Additionally patient does seem to have increased work of breathing but patient reports she does breathe heavy normally and is normally on oxygen 2 L.  Patient does seem to be working slightly harder than usual I would suspect and after discussion with patient we will get a further workup.  Patient's CBC did show some slight leukocytosis but no change in hemoglobin.  Metabolic panel did not show any elevation in her creatinine and otherwise without acute emergent process.  Her urine did come back positive for infection and will be started on antibiotics after review of her urine cultures.  Previous review of urine culture showed susceptibility to Rocephin and this will be started.  Additionally x-ray and CT abdomen obtained.  Troponin came back equivocal and will plan to repeat this.  BNP significant elevated and given the exam findings on chest I would recommend diuresis.  Patient's chest x-ray also confirmed this with pulmonary edema bilaterally and Bumex 2 mg IV ordered.  CT of  the abdomen is otherwise unremarkable for acute process and patient will start with diuresis.  Repeat troponin did come back significantly elevated at 30 from 21.  I did speak with cardiology and at this time they would not recommend heparin but trending the troponin and suspect demand ischemia with increased work of breathing.  Patient was transition from nasal cannula to BiPAP which seemed to help with some of the work of breathing and made her more comfortable.  Additionally patient's body habitus does warrant increased work and BiPAP did seem to help with this.  Patient recommended for diuresis and will continue with hospitalist service patient.  Discussed with hospitalist for admission.    0 minutes of critical care time    MEDICATIONS GIVEN IN THE EMERGENCY:  Medications   ondansetron (ZOFRAN) injection 4 mg (has no administration in time range)   empagliflozin (JARDIANCE) tablet 10 mg (has no administration in time range)   escitalopram (LEXAPRO) tablet 10 mg (has no administration in time range)   potassium chloride sheila ER (KLOR-CON M10) CR tablet 30 mEq (has no administration in time range)   simvastatin (ZOCOR) tablet 10 mg (10 mg Oral Not Given 3/23/25 0301)   spironolactone (ALDACTONE) half-tab 12.5 mg (has no administration in time range)   lidocaine 1 % 0.1-1 mL (has no administration in time range)   lidocaine (LMX4) cream (has no administration in time range)   sodium chloride (PF) 0.9% PF flush 3 mL (has no administration in time range)   sodium chloride (PF) 0.9% PF flush 3 mL (has no administration in time range)   senna-docusate (SENOKOT-S/PERICOLACE) 8.6-50 MG per tablet 1 tablet (has no administration in time range)     Or   senna-docusate (SENOKOT-S/PERICOLACE) 8.6-50 MG per tablet 2 tablet (has no administration in time range)   calcium carbonate (TUMS) chewable tablet 1,000 mg (has no administration in time range)   enoxaparin ANTICOAGULANT (LOVENOX) injection 40 mg (has no administration  in time range)   hydrALAZINE (APRESOLINE) tablet 10 mg (has no administration in time range)     Or   hydrALAZINE (APRESOLINE) injection 10 mg (has no administration in time range)   HYDROmorphone (DILAUDID) injection 0.2 mg (has no administration in time range)   HYDROmorphone (DILAUDID) injection 0.4 mg (has no administration in time range)   melatonin tablet 5 mg (has no administration in time range)   ondansetron (ZOFRAN ODT) ODT tab 4 mg (has no administration in time range)     Or   ondansetron (ZOFRAN) injection 4 mg (has no administration in time range)   benzocaine-menthol (CHLORASEPTIC) 6-10 MG lozenge 1 lozenge (has no administration in time range)   guaiFENesin (ROBITUSSIN) 20 mg/mL solution 200 mg (has no administration in time range)   bumetanide (BUMEX) injection 2 mg (has no administration in time range)   acetaminophen (TYLENOL) tablet 650 mg (has no administration in time range)     Or   acetaminophen (TYLENOL) Suppository 650 mg (has no administration in time range)   piperacillin-tazobactam (ZOSYN) 3.375 g vial to attach to  mL bag (has no administration in time range)   naloxone (NARCAN) injection 0.2 mg (has no administration in time range)     Or   naloxone (NARCAN) injection 0.4 mg (has no administration in time range)     Or   naloxone (NARCAN) injection 0.2 mg (has no administration in time range)     Or   naloxone (NARCAN) injection 0.4 mg (has no administration in time range)   cefTRIAXone (ROCEPHIN) 1 g vial to attach to  mL bag for ADULTS or NS 50 mL bag for PEDS (0 g Intravenous Stopped 3/23/25 0126)   iopamidol (ISOVUE-370) solution 90 mL (90 mLs Intravenous $Given 3/22/25 2355)   bumetanide (BUMEX) injection 2 mg (2 mg Intravenous $Given 3/23/25 0047)   ketorolac (TORADOL) injection 15 mg (15 mg Intravenous $Given 3/23/25 0131)   acetaminophen (TYLENOL) tablet 650 mg (650 mg Oral $Given 3/23/25 0131)   piperacillin-tazobactam (ZOSYN) 3.375 g vial to attach to  mL  bag (0 g Intravenous Stopped 3/23/25 0348)       NEW PRESCRIPTIONS STARTED AT TODAY'S ER VISIT:  Current Discharge Medication List             =================================================================    HPI    Patient information was obtained from: The patient    Use of : N/A         Bess DILIA Enamorado is a 49 year old female with a past medical history of multiple sclerosis, HTN, anxiety, depression, cellulitis, CHF, who presents with dysuria.    The patient reports she may have a urinary tract infection given her urine was cloudy. She is also concerned for sepsis because the wound on the left lower extremity is draining and she is having a fever. She is concerned of a bowel obstruction because her abdomen is bloated and she has been feeling nauseous and backed up. She has been vomiting, but notes of only phlegm coming up. She has history of cholecystectomy. Her last bowel obstruction occurred in February, where she was eventually admitted. She was just discharged home from TCU as of recent. The patient notes she is normally able to ambulate, but she has been having increase difficulty in getting around. No complaints of any other associated symptoms.      REVIEW OF SYSTEMS   Review of Systems   Constitutional:  Positive for fever.   Gastrointestinal:  Positive for abdominal distention (bloated) and nausea.   Genitourinary:         Positive for cloudy urine   Skin:  Positive for wound (drainage from wound on LLE).   All other systems reviewed and are negative.       PAST MEDICAL HISTORY:  Past Medical History:   Diagnosis Date    Acute on chronic diastolic congestive heart failure (H) 02/09/2022    Arthritis 2019    Hips    ASCUS favor benign 08/2015    Neg HPV    Depressive disorder 2010    H/O colposcopy with cervical biopsy 09/14/2015    Bx & ECC - negative    Hypertension 2019    LSIL on Pap smear 07/2014    Neg high risk HPV    Multiple sclerosis (H) 08/29/2005 9/18/200pt dx with MS 2004--dx  by neurolgi and is followed by Dr. Harris    Myocardial infarction (H)     Obese     Pneumonia due to infectious organism, unspecified laterality, unspecified part of lung 02/08/2022    Sepsis (Temp 101, , WBC 13.0) 10/23/2018    Shingles 10/2016    SIRS (systemic inflammatory response syndrome) (H) 03/04/2021    Sleep apnea     UNSPEC CONSTIPATION 09/18/2006 9/18/2006   Has tried otc suppository and otc lax.        PAST SURGICAL HISTORY:  Past Surgical History:   Procedure Laterality Date    CHOLECYSTECTOMY, LAPOROSCOPIC      Cholecystectomy, Laparoscopic    COLONOSCOPY  2007?    Bathroom issue..results normal    COMBINED CYSTOSCOPY, RETROGRADES, EXCHANGE STENT URETER(S) Right 2/21/2022    Procedure: CYSTOSCOPY, WITH right RETROGRADE PYELOGRAM AND right URETERAL STENT PLACEMENT;  Surgeon: Doyle Palomares MD;  Location: [facility]    OPEN REDUCTION INTERNAL FIXATION TOE(S)  4/13/2012    Procedure:OPEN REDUCTION INTERNAL FIXATION TOE(S); Open reduction internal fixation right proximal fifth metatarsal fracture-   Anes-choice block; Surgeon:LEY, JEFFREY DUANE; Location:[facility] OR    PICC SINGLE LUMEN PLACEMENT  3/20/2024    PICC TRIPLE LUMEN PLACEMENT  2/21/2022            CURRENT MEDICATIONS:      Current Facility-Administered Medications:     acetaminophen (TYLENOL) tablet 650 mg, 650 mg, Oral, Q4H PRN **OR** acetaminophen (TYLENOL) Suppository 650 mg, 650 mg, Rectal, Q4H PRN, Bro Billingsley MD    benzocaine-menthol (CHLORASEPTIC) 6-10 MG lozenge 1 lozenge, 1 lozenge, Buccal, Q1H PRN, Bro Billingsley MD    bumetanide (BUMEX) injection 2 mg, 2 mg, Intravenous, Q8H, Bro Billingsley MD    calcium carbonate (TUMS) chewable tablet 1,000 mg, 1,000 mg, Oral, 4x Daily PRN, Bro Billingsley MD    empagliflozin (JARDIANCE) tablet 10 mg, 10 mg, Oral, Daily, Bro Billingsley MD    enoxaparin ANTICOAGULANT (LOVENOX) injection 40 mg, 40 mg, Subcutaneous, Q12H, Bro Billingsley MD    escitalopram (LEXAPRO) tablet 10  mg, 10 mg, Oral, Daily, Bro Billingsley MD    guaiFENesin (ROBITUSSIN) 20 mg/mL solution 200 mg, 200 mg, Oral, Q4H PRN, Bro Billingsley MD    hydrALAZINE (APRESOLINE) tablet 10 mg, 10 mg, Oral, Q4H PRN **OR** hydrALAZINE (APRESOLINE) injection 10 mg, 10 mg, Intravenous, Q4H PRN, Bro Billingsley MD    HYDROmorphone (DILAUDID) injection 0.2 mg, 0.2 mg, Intravenous, Q2H PRN, Bro Billingsley MD    HYDROmorphone (DILAUDID) injection 0.4 mg, 0.4 mg, Intravenous, Q2H PRN, Bro Billingsley MD    lidocaine (LMX4) cream, , Topical, Q1H PRN, Bro Billingsley MD    lidocaine 1 % 0.1-1 mL, 0.1-1 mL, Other, Q1H PRN, Bro Billingsley MD    melatonin tablet 5 mg, 5 mg, Oral, At Bedtime PRN, Bro Billingsley MD    naloxone (NARCAN) injection 0.2 mg, 0.2 mg, Intravenous, Q2 Min PRN **OR** naloxone (NARCAN) injection 0.4 mg, 0.4 mg, Intravenous, Q2 Min PRN **OR** naloxone (NARCAN) injection 0.2 mg, 0.2 mg, Intramuscular, Q2 Min PRN **OR** naloxone (NARCAN) injection 0.4 mg, 0.4 mg, Intramuscular, Q2 Min PRN, Bro Billingsley MD    ondansetron (ZOFRAN ODT) ODT tab 4 mg, 4 mg, Oral, Q6H PRN **OR** ondansetron (ZOFRAN) injection 4 mg, 4 mg, Intravenous, Q6H PRN, Bro Billingsley MD    ondansetron (ZOFRAN) injection 4 mg, 4 mg, Intravenous, Q30 Min PRN, Lowell Levy MD    piperacillin-tazobactam (ZOSYN) 3.375 g vial to attach to  mL bag, 3.375 g, Intravenous, Q8H, Bro Billingsley MD    potassium chloride sheila ER (KLOR-CON M10) CR tablet 30 mEq, 30 mEq, Oral, Daily, Bro Billingsley MD    senna-docusate (SENOKOT-S/PERICOLACE) 8.6-50 MG per tablet 1 tablet, 1 tablet, Oral, BID PRN **OR** senna-docusate (SENOKOT-S/PERICOLACE) 8.6-50 MG per tablet 2 tablet, 2 tablet, Oral, BID PRN, Bro Billingsley MD    simvastatin (ZOCOR) tablet 10 mg, 10 mg, Oral, At Bedtime, Bro Billingsley MD    sodium chloride (PF) 0.9% PF flush 3 mL, 3 mL, Intracatheter, Q8H KATERINA, Bro Billingsley MD    sodium chloride (PF) 0.9% PF flush 3 mL, 3 mL,  Intracatheter, q1 min prn, Bro Billingsley MD    spironolactone (ALDACTONE) half-tab 12.5 mg, 12.5 mg, Oral, Daily, Bro Billingsley MD    ALLERGIES:  No Known Allergies    FAMILY HISTORY:  Family History   Problem Relation Age of Onset    Neurologic Disorder Father         MS    Heart Disease Father         irregular heart rate    Diabetes Father     Melanoma Father     Sleep Apnea Father     Other Cancer Father     Cancer Maternal Grandfather         lung    Other Cancer Maternal Grandfather     Cancer Paternal Grandmother         stomach    Other Cancer Paternal Grandmother     Cancer Mother     Other Cancer Mother     Thyroid Disease Mother     Gynecology Maternal Aunt         PCOS    Hypertension Maternal Grandmother     Anxiety Disorder Maternal Grandmother     Thyroid Disease Maternal Grandmother     Anxiety Disorder Sister        SOCIAL HISTORY:   Social History     Socioeconomic History    Marital status: Single   Occupational History     Employer: [employer]   Tobacco Use    Smoking status: Never    Smokeless tobacco: Never   Vaping Use    Vaping status: Never Used   Substance and Sexual Activity    Alcohol use: Yes     Comment: social    Drug use: No    Sexual activity: Not Currently     Partners: Male     Birth control/protection: Pill   Other Topics Concern    Parent/sibling w/ CABG, MI or angioplasty before 65F 55M? No   Social History Narrative    , 3rd-5th grade     Social Drivers of Health     Financial Resource Strain: Low Risk  (2/12/2025)    Financial Resource Strain     Within the past 12 months, have you or your family members you live with been unable to get utilities (heat, electricity) when it was really needed?: No   Food Insecurity: Low Risk  (2/12/2025)    Food Insecurity     Within the past 12 months, did you worry that your food would run out before you got money to buy more?: No     Within the past 12 months, did the food you bought just not last and you  "didn t have money to get more?: No   Transportation Needs: Low Risk  (2/12/2025)    Transportation Needs     Within the past 12 months, has lack of transportation kept you from medical appointments, getting your medicines, non-medical meetings or appointments, work, or from getting things that you need?: No   Physical Activity: Inactive (3/21/2024)    Exercise Vital Sign     Days of Exercise per Week: 0 days     Minutes of Exercise per Session: 0 min   Stress: No Stress Concern Present (12/4/2020)    [employer] [place] [employer] - Occupational Stress Questionnaire     Feeling of Stress : Only a little   Social Connections: Unknown (3/1/2022)    Social Connection and Isolation Panel [NHANES]     Frequency of Communication with Friends and Family: Twice a week     Frequency of Social Gatherings with Friends and Family: Twice a week   Interpersonal Safety: Low Risk  (2/12/2025)    Interpersonal Safety     Do you feel physically and emotionally safe where you currently live?: Yes     Within the past 12 months, have you been hit, slapped, kicked or otherwise physically hurt by someone?: No     Within the past 12 months, have you been humiliated or emotionally abused in other ways by your partner or ex-partner?: No   Housing Stability: Low Risk  (2/12/2025)    Housing Stability     Do you have housing? : Yes     Are you worried about losing your housing?: No       PHYSICAL EXAM:    Vitals: BP 91/54   Pulse 95   Temp (!) 101.9  F (38.8  C) (Oral)   Resp 24   Ht 1.626 m (5' 4\")   Wt 127.9 kg (282 lb)   LMP 03/03/2025 (Within Days)   SpO2 97%   BMI 48.41 kg/m     Physical Exam  Vitals and nursing note reviewed.   Constitutional:       General: She is not in acute distress.     Appearance: Normal appearance. She is obese. She is ill-appearing.   HENT:      Head: Normocephalic.      Mouth/Throat:      Pharynx: Oropharynx is clear.   Cardiovascular:      Rate and Rhythm: Regular rhythm. Tachycardia present. "      Heart sounds: Normal heart sounds.   Pulmonary:      Effort: Tachypnea and accessory muscle usage present. No respiratory distress.      Breath sounds: Examination of the right-lower field reveals decreased breath sounds and rales. Examination of the left-lower field reveals decreased breath sounds and rales. Decreased breath sounds and rales present. No wheezing or rhonchi.   Abdominal:      General: Abdomen is flat. There is no distension.      Palpations: Abdomen is soft.      Tenderness: There is no abdominal tenderness. There is no right CVA tenderness, left CVA tenderness or guarding.   Musculoskeletal:      Cervical back: Normal range of motion.      Right lower leg: Edema (Pitting edema on bilateral lower extremities, left lower extremity with breakdown in one of the folds between the edema with weeping but no signs of erythema) present.      Left lower leg: Edema present.   Skin:     Findings: No erythema.   Neurological:      General: No focal deficit present.      Mental Status: She is alert and oriented to person, place, and time.   Psychiatric:         Behavior: Behavior normal.        LAB:  All pertinent labs reviewed and interpreted.  Labs Ordered and Resulted from Time of ED Arrival to Time of ED Departure   COMPREHENSIVE METABOLIC PANEL - Abnormal       Result Value    Sodium 137      Potassium 4.1      Carbon Dioxide (CO2) 31 (*)     Anion Gap 9      Urea Nitrogen 23.3 (*)     Creatinine 0.89      GFR Estimate 79      Calcium 10.1      Chloride 97 (*)     Glucose 103 (*)     Alkaline Phosphatase 89      AST 23      ALT 11      Protein Total 8.5 (*)     Albumin 4.1      Bilirubin Total 2.6 (*)    ROUTINE UA WITH MICROSCOPIC REFLEX TO CULTURE - Abnormal    Color Urine Yellow      Appearance Urine Turbid (*)     Glucose Urine 500 (*)     Bilirubin Urine Negative      Ketones Urine Negative      Specific Gravity Urine 1.012      Blood Urine 0.03 mg/dL (*)     pH Urine 6.5      Protein Albumin  Urine 10 (*)     Urobilinogen Urine <2.0      Nitrite Urine Positive (*)     Leukocyte Esterase Urine 500 Gigi/uL (*)     Bacteria Urine Moderate (*)     WBC Clumps Urine Present (*)     Mucus Urine Present (*)     RBC Urine 7 (*)     WBC Urine >182 (*)     Squamous Epithelials Urine 1      Hyaline Casts Urine 3 (*)    TROPONIN T, HIGH SENSITIVITY - Abnormal    Troponin T, High Sensitivity 21 (*)    CBC WITH PLATELETS AND DIFFERENTIAL - Abnormal    WBC Count 11.8 (*)     RBC Count 5.55 (*)     Hemoglobin 15.7      Hematocrit 49.1 (*)     MCV 89      MCH 28.3      MCHC 32.0      RDW 17.1 (*)     Platelet Count 196      % Neutrophils 90      % Lymphocytes 6      % Monocytes 3      % Eosinophils 1      % Basophils 1      % Immature Granulocytes 0      NRBCs per 100 WBC 0      Absolute Neutrophils 10.6 (*)     Absolute Lymphocytes 0.7 (*)     Absolute Monocytes 0.4      Absolute Eosinophils 0.1      Absolute Basophils 0.1      Absolute Immature Granulocytes 0.1      Absolute NRBCs 0.0     TROPONIN T, HIGH SENSITIVITY - Abnormal    Troponin T, High Sensitivity 30 (*)    NT PROBNP INPATIENT - Abnormal    N terminal Pro BNP Inpatient 5,125 (*)    BLOOD GAS VENOUS - Abnormal    pH Venous 7.44 (*)     pCO2 Venous 44      pO2 Venous 32      Bicarbonate Venous 30 (*)     Base Excess/Deficit Venous 4.7 (*)     FIO2 32      Oxyhemoglobin Venous 60 (*)     O2 Sat, Venous 60.9 (*)    MAGNESIUM - Abnormal    Magnesium 2.4 (*)    MAGNESIUM - Abnormal    Magnesium 2.4 (*)    CRP INFLAMMATION - Abnormal    CRP Inflammation 19.30 (*)    D DIMER QUANTITATIVE - Abnormal    D-Dimer Quantitative 0.51 (*)    LACTIC ACID WHOLE BLOOD WITH 1X REPEAT IN 2 HR WHEN >2 - Normal    Lactic Acid, Initial 1.7     PROCALCITONIN - Normal    Procalcitonin 0.27     BASIC METABOLIC PANEL   CBC WITH PLATELETS   INR   MAGNESIUM   HEPATIC FUNCTION PANEL   URINE CULTURE   BLOOD CULTURE   BLOOD CULTURE       RADIOLOGY:  XR Chest 1 View   Final Result    IMPRESSION: Somewhat low lung volumes. Elevation of the right hemidiaphragm. Enlarged heart. Prominent pulmonary vasculature suggestive of congestion. No focal lung consolidation, pleural effusion or pneumothorax.      CT Abdomen Pelvis w Contrast   Final Result   IMPRESSION:    1.  No acute findings or inflammatory changes in the abdomen or pelvis.      2.  Mildly lobulated hepatic surface contour, suspicious for chronic hepatocellular disease.      US Lower Extremity Venous Duplex Bilateral    (Results Pending)     EKG:   Performed at: 10:24 PM on March 22, 2025  Impression: Sinus tachycardia, incomplete right bundle branch block, compared to prior EKG morphology is somewhat similar but tachycardic now.  No signs of acute ST elevation ischemia.  Rate: 107 bpm  Rhythm: Sinus tachycardia  QRS Interval: 104 ms  QTc Interval: 475 ms  Comparison: Comparison prior EKG from February 11, 2025 with similar incomplete right bundle branch block and slight nonspecific ST changes likely rate related  I have independently reviewed and interpreted the EKG(s) documented above.     PROCEDURES:   Procedures       I, Javed Vo, am serving as a scribe to document services personally performed by Dr. Lowell Levy  based on my observation and the provider's statements to me. I, Lowell Levy MD attest that Javed Vo is acting in a scribe capacity, has observed my performance of the services and has documented them in accordance with my direction.      Lowell Levy M.D.  Emergency Medicine  [facility] Emergency Department       Lowell Levy MD  03/23/25 1094

## 2025-03-23 NOTE — PROGRESS NOTES
Patient was transported to , on bipap.  Patient tolerated well.  Transport was done with no complications.      Resp: 24    Kenneth H. Kjellberg

## 2025-03-23 NOTE — PLAN OF CARE
Goal Outcome Evaluation:         Problem: Adult Inpatient Plan of Care  Goal: Optimal Comfort and Wellbeing  Outcome: Progressing  Goal: Readiness for Transition of Care  Outcome: Progressing  Intervention: Mutually Develop Transition Plan  Recent Flowsheet Documentation  Taken 3/23/2025 0400 by Yamil Ellison RN  Patient/Family Anticipates Transition to: group home  Equipment Currently Used at Home:   commode chair   grab bar, toilet   grab bar, tub/shower   shower chair   walker, rolling   wheelchair, manual   other (see comments)         Pt is a returning  to p1 due to having fluid overload. On Bipap to limit IV diuretics, which are hard on her on her kidneys .  Pt blood pressure  are running soft. Skin is blistered, cracked, edematous, limited mobility. She is A/O x4. On tele: SR. Had ultrasound, labs and RT eval this AM. She can express her needs. Bed alarm on. Bipap is functional. Pt is stable and stations near the desk.

## 2025-03-23 NOTE — ED TRIAGE NOTES
Brought in by [facility]. Patient from home, reports dysuria and noticing cloudy urine today. Also reports constipation for past several weeks and states she has a feeling that food is sitting high in her stomach/throat. Denies pain. Has history of MS and is on O2 at 3L at baseline.      Triage Assessment (Adult)       Row Name 03/22/25 9919          Triage Assessment    Airway WDL WDL        Respiratory WDL    Respiratory WDL WDL        Skin Circulation/Temperature WDL    Skin Circulation/Temperature WDL WDL        Cardiac WDL    Cardiac WDL WDL

## 2025-03-23 NOTE — ED NOTES
[facility]  Expected date: 3/22/25  Expected time:   Means of arrival:   Comments:  MH; 49F, fever/hematuria, baseline O2, mobility issues, VSS. A/O

## 2025-03-23 NOTE — CONSULTS
"  HEART CARE CONSULTATON NOTE        Assessment/Recommendations   Assessment:   Acute on chronic hypoxia.  BNP: >5,000.    Acute on chronic CHF  with right ventricular systolic failure.  Severe RV enlargement and dysfunction.    Severe Pulmonary Hypertension. Morbid obesity, NARCISA.  Patient has chronic hypoxia with clubbing.  Morbid obesity (BMI: 48)  Acute  cystitis with severe sepsis possibly E. coli sepsis (fever, hypotension)  MS   Obesity Hypoventilation Syndrome   Moderate Tricuspid Regurgitation    Plan:   Resume Bumex at 2 mg IV every 8 hours, hold if hypotensive  Continue Jardiance 10 mg daily  Continue Aldactone 12.5 mg daily  Antibiotic therapy per medicine team  If possible avoid further ketorolac use as this will decompensated heart failure this is an NSAID    Will follow    Clinically Significant Risk Factors Present on Admission          # Hypochloremia: Lowest Cl = 97 mmol/L in last 2 days, will monitor as appropriate       # Coagulation Defect: INR = 1.30 (Ref range: 0.85 - 1.15) and/or PTT = N/A, will monitor for bleeding    # Drug Induced Platelet Defect: home medication list includes an antiplatelet medication       # Hypertension: Noted on problem list  # Acute heart failure with preserved ejection fraction: heart failure noted on problem list, last echo with EF >50%, and receiving IV diuretics              # Severe Obesity: Estimated body mass index is 48.41 kg/m  as calculated from the following:    Height as of this encounter: 1.626 m (5' 4\").    Weight as of this encounter: 127.9 kg (282 lb).           # Financial/Environmental Concerns:               History of Present Illness/Subjective    HPI: Bess Enamorado is a 49 year old female history of morbid obesity, severe pulmonary hypertension, severe right ventricular systolic heart failure who presented with fevers chills and abdominal cramping found to have sepsis with cystitis.    For sepsis she was given IV fluids which has resulted in some " "decompensation in her chronic heart failure.  She currently denies any significant orthopnea symptoms.  She is back on 3 L nasal cannula which is her baseline oxygen use.  She was requiring BiPAP overnight.  She is been placed back on IV Bumex to improve fluid status.    She denies any chest pain.    Reviewed most recent notes by cardiac team.  She is followed by her heart failure team as well.    Telemetry reviewed.  Patient maintaining sinus rhythm.  No significant atrial or ventricular arrhythmias    ECHO: 2025 reviewed results  1. Technically difficult study.  2. The left ventricle is normal in size.Image quality does not provide for  detailed assessment of LV systolic function, but is felt to be normal with a  visually estimated ejection fraction of roughly 65%.  3. There is abnormal septal motion c/w right ventricular overload.  4. Probable mild-moderate tricuspid insufficiency (the color Doppler tricuspid  insufficiency jet is poorly visualized).  5. There is severe right ventricular enlargement with severely reduced right  ventricular systolic performance.  6. The left atrium appears enlarged though difficult to quantify due to  inadequate visualization. There is severe right atrial enlargement.  7. Right ventricular systolic pressure relative to right atrial pressure is  moderate-severely increased. The pulmonary artery pressure is estimated to be  70-75 mmHg plus right atrial pressure. In addition, the IVC appears dilated  with decreased phasic variation in caval diameter consistent with elevated  right atrial pressure.     Physical Examination     VITALS: /57 (BP Location: Left arm)   Pulse 78   Temp 98  F (36.7  C) (Axillary)   Resp 20   Ht 1.626 m (5' 4\")   Wt 127.9 kg (282 lb)   LMP 03/03/2025 (Within Days)   SpO2 96%   BMI 48.41 kg/m    BMI: Body mass index is 48.41 kg/m .  Wt Readings from Last 3 Encounters:   03/22/25 127.9 kg (282 lb)   03/17/25 122 kg (269 lb)   02/25/25 124.8 kg " (275 lb 1.6 oz)       Intake/Output Summary (Last 24 hours) at 3/23/2025 0846  Last data filed at 3/23/2025 0350  Gross per 24 hour   Intake 200 ml   Output 400 ml   Net -200 ml     General Appearance:   Morbidly obese.  Lying at 30 degrees in bed.   ENT/Mouth: membranes moist, no oral lesions or bleeding gums.      EYES:  no scleral icterus, normal conjunctivae   Neck: no carotid bruits or thyromegaly   Chest/Lungs:   Lung sounds are distant.  Difficult to hear.  No significant rales noted   Cardiovascular:   Heart sounds distant.  Regular.  No significant murmurs noted.  Severe lower extremity edema and lymphedema noted   Abdomen:  No significant tenderness or rebounding   Extremities: Cyanosis of the fingers noted. + Clubbing   Skin: no xanthelasma, warm.    Neurologic: normal  bilateral, no tremors     Psychiatric: alert and oriented x3, calm           Lab Results    Chemistry/lipid CBC Cardiac Enzymes/BNP/TSH/INR   Recent Labs   Lab Test 02/12/25  0720   CHOL 81   HDL 11*   LDL 43   TRIG 134     Recent Labs   Lab Test 02/12/25  0720 09/27/23  1631 05/11/22  1455   LDL 43 42 89     Recent Labs   Lab Test 03/23/25  0713      POTASSIUM 4.0   CHLORIDE 101   CO2 26   *   BUN 22.9*   CR 1.07*   GFRESTIMATED 63   EVITA 9.6     Recent Labs   Lab Test 03/23/25  0713 03/22/25 2149 03/17/25  1039   CR 1.07* 0.89 0.84     Recent Labs   Lab Test 02/12/25  0720 02/13/20  1641 06/26/17  1400   A1C 5.6 5.7* 5.6          Recent Labs   Lab Test 03/23/25  0713   WBC 12.0*   HGB 14.6   HCT 45.5   MCV 92   *     Recent Labs   Lab Test 03/23/25  0713 03/22/25  2149 03/17/25  1039   HGB 14.6 15.7 14.9    Recent Labs   Lab Test 04/06/22 2048 02/20/22  2122 02/09/22  0457   TROPONINI 0.03 0.05 0.33*     Recent Labs   Lab Test 03/22/25  2149 03/17/25  1039 02/11/25  1348 01/13/25  1224 05/01/24  0637 03/16/24  0609 12/21/23  1324 04/06/22  1107 02/20/22 2122 02/08/22  1620   BNP  --   --   --   --   --   --   --   223* 64 2,230*   NTBNPI 5,125*  --  7,880*  --  1,975*   < >  --   --   --   --    NTBNP  --  2,379*  --  1,702*  --   --  2,712*  --   --   --     < > = values in this interval not displayed.     Recent Labs   Lab Test 02/12/25  0720   TSH 3.52     Recent Labs   Lab Test 03/23/25  0713 03/17/25  1039 04/22/24  0717   INR 1.30* 1.28* 1.35*        Medical History  Surgical History Family History Social History   Past Medical History:   Diagnosis Date    Acute on chronic diastolic congestive heart failure (H) 02/09/2022    Arthritis 2019    Hips    ASCUS favor benign 08/2015    Neg HPV    Depressive disorder 2010    H/O colposcopy with cervical biopsy 09/14/2015    Bx & ECC - negative    Hypertension 2019    LSIL on Pap smear 07/2014    Neg high risk HPV    Multiple sclerosis (H) 08/29/2005 9/18/200pt dx with MS 2004--dx by neurolgist and is followed by Dr. Harris    Myocardial infarction (H)     Obese     Pneumonia due to infectious organism, unspecified laterality, unspecified part of lung 02/08/2022    Sepsis (Temp 101, , WBC 13.0) 10/23/2018    Shingles 10/2016    SIRS (systemic inflammatory response syndrome) (H) 03/04/2021    Sleep apnea     UNSPEC CONSTIPATION 09/18/2006 9/18/2006   Has tried otc suppository and otc lax.      Past Surgical History:   Procedure Laterality Date    CHOLECYSTECTOMY, LAPOROSCOPIC      Cholecystectomy, Laparoscopic    COLONOSCOPY  2007?    Bathroom issue..results normal    COMBINED CYSTOSCOPY, RETROGRADES, EXCHANGE STENT URETER(S) Right 2/21/2022    Procedure: CYSTOSCOPY, WITH right RETROGRADE PYELOGRAM AND right URETERAL STENT PLACEMENT;  Surgeon: Doyle Palomares MD;  [place] [facility] [place] REDUCTION INTERNAL FIXATION TOE(S)  4/13/2012    Procedure:OPEN REDUCTION INTERNAL FIXATION TOE(S); Open reduction internal fixation right proximal fifth metatarsal fracture-   Anes-choice block; Surgeon:LEY, JEFFREY DUANE; Location:[place] OR    PICC SINGLE LUMEN PLACEMENT   3/20/2024    PICC TRIPLE LUMEN PLACEMENT  2/21/2022          Family History   Problem Relation Age of Onset    Neurologic Disorder Father         MS    Heart Disease Father         irregular heart rate    Diabetes Father     Melanoma Father     Sleep Apnea Father     Other Cancer Father     Cancer Maternal Grandfather         lung    Other Cancer Maternal Grandfather     Cancer Paternal Grandmother         stomach    Other Cancer Paternal Grandmother     Cancer Mother     Other Cancer Mother     Thyroid Disease Mother     Gynecology Maternal Aunt         PCOS    Hypertension Maternal Grandmother     Anxiety Disorder Maternal Grandmother     Thyroid Disease Maternal Grandmother     Anxiety Disorder Sister         Social History     Socioeconomic History    Marital status: Single     Spouse name: Not on file    Number of children: Not on file    Years of education: Not on file    Highest education level: Not on file   Occupational History     Employer: [employer]   Tobacco Use    Smoking status: Never    Smokeless tobacco: Never   Vaping Use    Vaping status: Never Used   Substance and Sexual Activity    Alcohol use: Yes     Comment: social    Drug use: No    Sexual activity: Not Currently     Partners: Male     Birth control/protection: Pill   Other Topics Concern    Parent/sibling w/ CABG, MI or angioplasty before 65F 55M? No   Social History Narrative    , 3rd-5th grade     Social Drivers of Health     Financial Resource Strain: Low Risk  (3/23/2025)    Financial Resource Strain     Within the past 12 months, have you or your family members you live with been unable to get utilities (heat, electricity) when it was really needed?: No   Food Insecurity: High Risk (3/23/2025)    Food Insecurity     Within the past 12 months, did you worry that your food would run out before you got money to buy more?: Yes     Within the past 12 months, did the food you bought just not last and you didn t  have money to get more?: No   Transportation Needs: High Risk (3/23/2025)    Transportation Needs     Within the past 12 months, has lack of transportation kept you from medical appointments, getting your medicines, non-medical meetings or appointments, work, or from getting things that you need?: Yes   Physical Activity: Inactive (3/21/2024)    Exercise Vital Sign     Days of Exercise per Week: 0 days     Minutes of Exercise per Session: 0 min   Stress: No Stress Concern Present (12/4/2020)    [employer] [place] [employer] - Occupational Stress Questionnaire     Feeling of Stress : Only a little   Social Connections: Unknown (3/1/2022)    Social Connection and Isolation Panel [NHANES]     Frequency of Communication with Friends and Family: Twice a week     Frequency of Social Gatherings with Friends and Family: Twice a week     Attends Zoroastrianism Services: Not on file     Active Member of Clubs or Organizations: Not on file     Attends Club or Organization Meetings: Not on file     Marital Status: Not on file   Interpersonal Safety: High Risk (3/23/2025)    Interpersonal Safety     Do you feel physically and emotionally safe where you currently live?: No     Within the past 12 months, have you been hit, slapped, kicked or otherwise physically hurt by someone?: No     Within the past 12 months, have you been humiliated or emotionally abused in other ways by your partner or ex-partner?: No   Housing Stability: Low Risk  (3/23/2025)    Housing Stability     Do you have housing? : Yes     Are you worried about losing your housing?: No         Medications  Allergies   No current outpatient medications on file.      No Known Allergies      Ced CAMILLE Torres, DO

## 2025-03-23 NOTE — PLAN OF CARE
Goal Outcome Evaluation:         Problem: Adult Inpatient Plan of Care  Goal: Plan of Care Review  Outcome: Progressing     Problem: Adult Inpatient Plan of Care  Goal: Optimal Comfort and Wellbeing  Outcome: Progressing  Intervention: Monitor Pain and Promote Comfort  Recent Flowsheet Documentation  Taken 3/23/2025 1300 by Majo Gordon RN  Pain Management Interventions:   emotional support   medication offered but refused   repositioned  Taken 3/23/2025 0830 by Majo Gordon RN  Pain Management Interventions:   emotional support   medication offered but refused   repositioned     Problem: UTI (Urinary Tract Infection)  Goal: Improved Infection Symptoms  Outcome: Progressing         Problem: Comorbidity Management  Goal: Blood Pressure in Desired Range  Outcome: Progressing  Intervention: Maintain Blood Pressure Management  Recent Flowsheet Documentation  Taken 3/23/2025 0830 by Majo Gordon RN  Medication Review/Management: medications reviewed       Pt is alert and oriented X4, pleasant and cooperative, rating left ankle pain #1, declines medication intervention.  Tele monitoring NSR.  Lung sounds diminished, sating 95% on O2 3L.  Pt tolerating low sodium diet, appetite good, fluid restriction 1500 mls/day, 480 ml in on day shift.  Moderate/severe LE lymph edema, L>R.  External catheter in use, patent draining zarina urine.  Family present at bedside.    Majo Gordon RN

## 2025-03-23 NOTE — ED NOTES
MD ordered bipap to help with fluid retention and ease the WOB. Placed pt on bipap, pt tolerating it well. RT will continue to follow.

## 2025-03-23 NOTE — MEDICATION SCRIBE - ADMISSION MEDICATION HISTORY
Medication Scribe Admission Medication History    Admission medication history is complete. The information provided in this note is only as accurate as the sources available at the time of the update.    Information Source(s): Patient via in-person    Pertinent Information: Patient reports self management of medications.     Changes made to PTA medication list:  Added: Micatin, Senokot   Deleted: Plavix (patient reports completion of 16 day course)  Changed: Tylenol from 325 tabs to 650 mg tabs (per patient)    Allergies reviewed with patient and updates made in EHR: yes    Medication History Completed By: Elias Gibbs 3/22/2025 10:35 PM    PTA Med List   Medication Sig Note Last Dose/Taking    acetaminophen (TYLENOL) 650 MG CR tablet Take 650 mg by mouth every 8 hours as needed for mild pain or fever.  Taking As Needed    aspirin (ASPIRIN LOW DOSE) 81 MG EC tablet Take 1 tablet (81 mg) by mouth daily.  3/22/2025 Morning    bumetanide (BUMEX) 2 MG tablet Take 4 tablets (8 mg) once daily for 1 week starting on 3/17, then decrease back to to 3 tablets (6 mg) once daily. 3/22/2025: 8 mg taken on 3/22 3/22/2025 Morning    Emollient (GOLD BOND MEDICATED BODY EX) Externally apply 1 Application topically 2 times daily as needed  Taking As Needed    empagliflozin (JARDIANCE) 10 MG TABS tablet Take 1 tablet (10 mg) by mouth daily.  3/22/2025 Morning    escitalopram (LEXAPRO) 10 MG tablet Take 1 tablet (10 mg) by mouth daily.  3/22/2025 Morning    miconazole (MICATIN) 2 % external powder Apply topically 2 times daily as needed for itching or other.  Taking As Needed    potassium chloride sheila ER (KLOR-CON M10) 10 MEQ CR tablet Take 3 tablets (30 mEq) by mouth daily.  3/22/2025 Morning    senna-docusate (SENOKOT-S/PERICOLACE) 8.6-50 MG tablet Take 1 tablet by mouth 2 times daily as needed for constipation.  Taking As Needed    simvastatin (ZOCOR) 10 MG tablet Take 1 tablet (10 mg) by mouth at bedtime.  3/21/2025 Bedtime     spironolactone (ALDACTONE) 25 MG tablet Take 0.5 tablets (12.5 mg) by mouth daily.  3/22/2025 Morning

## 2025-03-23 NOTE — ED NOTES
"[facility] ED Handoff Report    ED Chief Complaint: dysuria    ED Diagnosis:  (N30.90) Cystitis  Comment:   Plan:     (J81.0) Acute pulmonary edema (H)  Comment:   Plan:     (R06.02) Shortness of breath  Comment:   Plan:     (N10) Pyelonephritis, acute  Comment:   Plan:        PMH:    Past Medical History:   Diagnosis Date    Acute on chronic diastolic congestive heart failure (H) 02/09/2022    Arthritis 2019    Hips    ASCUS favor benign 08/2015    Neg HPV    Depressive disorder 2010    H/O colposcopy with cervical biopsy 09/14/2015    Bx & ECC - negative    Hypertension 2019    LSIL on Pap smear 07/2014    Neg high risk HPV    Multiple sclerosis (H) 08/29/2005 9/18/200pt dx with MS 2004--dx by neurolgist and is followed by Dr. Harris    Myocardial infarction (H)     Obese     Pneumonia due to infectious organism, unspecified laterality, unspecified part of lung 02/08/2022    Sepsis (Temp 101, , WBC 13.0) 10/23/2018    Shingles 10/2016    SIRS (systemic inflammatory response syndrome) (H) 03/04/2021    Sleep apnea     UNSPEC CONSTIPATION 09/18/2006 9/18/2006   Has tried otc suppository and otc lax.         Code Status:  Full Code     Falls Risk: Yes Band: Applied    Current Living Situation/Residence: lives alone, receives home care. Was recently discharged from a TCU     Elimination Status: Continent: wears briefs, currently using a pure wick.     Activity Level: 2 assist, is able to stand and walk short distances at baseline. History of MS and has very limited movement of left leg.     Patients Preferred Language:  English     Needed: No    Vital Signs:  BP (!) 142/65   Pulse 107   Temp (!) 101.9  F (38.8  C) (Oral)   Resp 24   Ht 1.626 m (5' 4\")   Wt 127.9 kg (282 lb)   LMP 03/03/2025 (Within Days)   SpO2 97%   BMI 48.41 kg/m       Cardiac Rhythm: NSR    Pain Score: 0/10, denies pain at this time. Received Toradol and tylenol for fever    Is the Patient Confused:  " No    Last Food or Drink: 03/23/25 at about 0130- ice chips. Patient is on a daily fluid restriction.     Focused Assessment:  resp labored before bipap applied. LS decreased. Patient reports dysuria. Has weeping wound on back of left thigh, patient states it is caused from her lymphoedema and she is receiving wound care at home. Provider not concerned for infection at this time.     Tests Performed: Done: Labs and Imaging    Treatments Provided:  IV abx. Patient is on bi-pap to help with pulmonary edema.     Family Dynamics/Concerns: No    Family Updated On Visitor Policy: Yes    Plan of Care Communicated to Family: No, patient can talk with family.     Who Was Updated about Plan of Care:      Belongings Checklist Done and Signed by Patient: Yes    Medications sent with patient: Zocor    Covid: asymptomatic , not tested.     Additional Information: Patient is on O2 3L via NC at home. Needs assistance to turn and get up due to mobility issues with left leg. Patient states she has been having difficulties with constipation for past couple of weeks and has been taking a stool softener. CT abd to check for SBO was negative.     Becky Cooley RN 3/23/2025 2:50 AM

## 2025-03-23 NOTE — PROGRESS NOTES
48 YO female with PMH significant for MS, hypertension, morbid obesity, severe bilateral chronic lymphedema with recurrent thigh cellulitis, severe pulm hypertension, chronic HFrEF,  severe NARCISA and OHS on CPAP, PE not on AC any longer, recurrent UTIs who presented to our ED for evaluation of fever and cloudy urine. Also found to have some e/o acute CHF for which cardiology has been consulted. O2 need is at baseline.     Admitted this morning by my colleague. Med rec reviewed. Patient seen and examined this morning. Patient reports feeling somewhat better since admission.     Cont antibiotics for UTI awaiting urine culture    Diurese per cardiology    Ultrasound leg showed a short segment of DVT in the left great saphenous vein, age-indeterminate. Discussed the risk/benefit of DOAC. Had been on DOAC before. Will start Eliquis    WOC consulted for multiple wounds.

## 2025-03-24 ENCOUNTER — APPOINTMENT (OUTPATIENT)
Dept: OCCUPATIONAL THERAPY | Facility: HOSPITAL | Age: 50
DRG: 871 | End: 2025-03-24
Attending: FAMILY MEDICINE
Payer: COMMERCIAL

## 2025-03-24 ENCOUNTER — APPOINTMENT (OUTPATIENT)
Dept: PHYSICAL THERAPY | Facility: HOSPITAL | Age: 50
DRG: 871 | End: 2025-03-24
Attending: FAMILY MEDICINE
Payer: COMMERCIAL

## 2025-03-24 LAB
ALBUMIN SERPL BCG-MCNC: 3.1 G/DL (ref 3.5–5.2)
ALP SERPL-CCNC: 72 U/L (ref 40–150)
ALT SERPL W P-5'-P-CCNC: 9 U/L (ref 0–50)
ANION GAP SERPL CALCULATED.3IONS-SCNC: 8 MMOL/L (ref 7–15)
AST SERPL W P-5'-P-CCNC: 18 U/L (ref 0–45)
BACTERIA UR CULT: ABNORMAL
BILIRUB DIRECT SERPL-MCNC: 0.73 MG/DL (ref 0–0.3)
BILIRUB SERPL-MCNC: 2.8 MG/DL
BUN SERPL-MCNC: 22.1 MG/DL (ref 6–20)
CALCIUM SERPL-MCNC: 9.4 MG/DL (ref 8.8–10.4)
CHLORIDE SERPL-SCNC: 102 MMOL/L (ref 98–107)
CREAT SERPL-MCNC: 1.01 MG/DL (ref 0.51–0.95)
CRP SERPL-MCNC: 142.9 MG/L
EGFRCR SERPLBLD CKD-EPI 2021: 67 ML/MIN/1.73M2
ERYTHROCYTE [DISTWIDTH] IN BLOOD BY AUTOMATED COUNT: 17.6 % (ref 10–15)
GLUCOSE SERPL-MCNC: 91 MG/DL (ref 70–99)
HCO3 SERPL-SCNC: 28 MMOL/L (ref 22–29)
HCT VFR BLD AUTO: 42 % (ref 35–47)
HGB BLD-MCNC: 13.4 G/DL (ref 11.7–15.7)
HOLD SPECIMEN: NORMAL
MAGNESIUM SERPL-MCNC: 2.5 MG/DL (ref 1.7–2.3)
MCH RBC QN AUTO: 28.9 PG (ref 26.5–33)
MCHC RBC AUTO-ENTMCNC: 31.9 G/DL (ref 31.5–36.5)
MCV RBC AUTO: 91 FL (ref 78–100)
PLATELET # BLD AUTO: 151 10E3/UL (ref 150–450)
POTASSIUM SERPL-SCNC: 3.8 MMOL/L (ref 3.4–5.3)
POTASSIUM SERPL-SCNC: 3.9 MMOL/L (ref 3.4–5.3)
PROT SERPL-MCNC: 6.9 G/DL (ref 6.4–8.3)
RBC # BLD AUTO: 4.64 10E6/UL (ref 3.8–5.2)
SODIUM SERPL-SCNC: 138 MMOL/L (ref 135–145)
WBC # BLD AUTO: 7.4 10E3/UL (ref 4–11)

## 2025-03-24 PROCEDURE — 120N000001 HC R&B MED SURG/OB

## 2025-03-24 PROCEDURE — 97162 PT EVAL MOD COMPLEX 30 MIN: CPT | Mod: GP | Performed by: PHYSICAL THERAPIST

## 2025-03-24 PROCEDURE — 999N000157 HC STATISTIC RCP TIME EA 10 MIN

## 2025-03-24 PROCEDURE — 83735 ASSAY OF MAGNESIUM: CPT | Performed by: HOSPITALIST

## 2025-03-24 PROCEDURE — 85027 COMPLETE CBC AUTOMATED: CPT | Performed by: FAMILY MEDICINE

## 2025-03-24 PROCEDURE — 80053 COMPREHEN METABOLIC PANEL: CPT | Performed by: HOSPITALIST

## 2025-03-24 PROCEDURE — 250N000013 HC RX MED GY IP 250 OP 250 PS 637: Performed by: HOSPITALIST

## 2025-03-24 PROCEDURE — 250N000011 HC RX IP 250 OP 636: Performed by: HOSPITALIST

## 2025-03-24 PROCEDURE — 250N000013 HC RX MED GY IP 250 OP 250 PS 637: Performed by: INTERNAL MEDICINE

## 2025-03-24 PROCEDURE — G0463 HOSPITAL OUTPT CLINIC VISIT: HCPCS

## 2025-03-24 PROCEDURE — 97165 OT EVAL LOW COMPLEX 30 MIN: CPT | Mod: GO

## 2025-03-24 PROCEDURE — 250N000011 HC RX IP 250 OP 636: Mod: JZ | Performed by: INTERNAL MEDICINE

## 2025-03-24 PROCEDURE — 86140 C-REACTIVE PROTEIN: CPT | Performed by: FAMILY MEDICINE

## 2025-03-24 PROCEDURE — 97116 GAIT TRAINING THERAPY: CPT | Mod: GP | Performed by: PHYSICAL THERAPIST

## 2025-03-24 PROCEDURE — 99233 SBSQ HOSP IP/OBS HIGH 50: CPT | Performed by: FAMILY MEDICINE

## 2025-03-24 PROCEDURE — 97535 SELF CARE MNGMENT TRAINING: CPT | Mod: GO

## 2025-03-24 PROCEDURE — 250N000011 HC RX IP 250 OP 636: Performed by: FAMILY MEDICINE

## 2025-03-24 PROCEDURE — 36415 COLL VENOUS BLD VENIPUNCTURE: CPT | Performed by: HOSPITALIST

## 2025-03-24 PROCEDURE — 99233 SBSQ HOSP IP/OBS HIGH 50: CPT | Performed by: INTERNAL MEDICINE

## 2025-03-24 PROCEDURE — 82248 BILIRUBIN DIRECT: CPT | Performed by: HOSPITALIST

## 2025-03-24 PROCEDURE — 250N000013 HC RX MED GY IP 250 OP 250 PS 637: Performed by: PHYSICIAN ASSISTANT

## 2025-03-24 PROCEDURE — 94660 CPAP INITIATION&MGMT: CPT

## 2025-03-24 RX ORDER — SIMVASTATIN 10 MG
10 TABLET ORAL AT BEDTIME
Status: DISCONTINUED | OUTPATIENT
Start: 2025-03-24 | End: 2025-03-28 | Stop reason: HOSPADM

## 2025-03-24 RX ORDER — GUAIFENESIN 200 MG/10ML
200 LIQUID ORAL EVERY 4 HOURS PRN
Status: DISCONTINUED | OUTPATIENT
Start: 2025-03-24 | End: 2025-03-28 | Stop reason: HOSPADM

## 2025-03-24 RX ORDER — SENNOSIDES 8.6 MG
8.6 TABLET ORAL 2 TIMES DAILY PRN
Status: DISCONTINUED | OUTPATIENT
Start: 2025-03-24 | End: 2025-03-28 | Stop reason: HOSPADM

## 2025-03-24 RX ORDER — CEFTRIAXONE 1 G/1
1 INJECTION, POWDER, FOR SOLUTION INTRAMUSCULAR; INTRAVENOUS EVERY 24 HOURS
Status: DISCONTINUED | OUTPATIENT
Start: 2025-03-24 | End: 2025-03-24

## 2025-03-24 RX ORDER — ACETAMINOPHEN 325 MG/1
650 TABLET ORAL EVERY 4 HOURS PRN
Status: DISCONTINUED | OUTPATIENT
Start: 2025-03-24 | End: 2025-03-28 | Stop reason: HOSPADM

## 2025-03-24 RX ORDER — BUMETANIDE 2 MG/1
6 TABLET ORAL DAILY
Status: DISCONTINUED | OUTPATIENT
Start: 2025-03-25 | End: 2025-03-28 | Stop reason: HOSPADM

## 2025-03-24 RX ORDER — ESCITALOPRAM OXALATE 5 MG/1
10 TABLET ORAL DAILY
Status: DISCONTINUED | OUTPATIENT
Start: 2025-03-25 | End: 2025-03-28 | Stop reason: HOSPADM

## 2025-03-24 RX ORDER — SPIRONOLACTONE 25 MG
12.5 TABLET ORAL DAILY
Status: DISCONTINUED | OUTPATIENT
Start: 2025-03-25 | End: 2025-03-28 | Stop reason: HOSPADM

## 2025-03-24 RX ORDER — BUMETANIDE 2 MG/1
6 TABLET ORAL DAILY
Status: DISCONTINUED | OUTPATIENT
Start: 2025-03-25 | End: 2025-03-24

## 2025-03-24 RX ORDER — CALCIUM CARBONATE 500 MG/1
1000 TABLET, CHEWABLE ORAL 4 TIMES DAILY PRN
Status: DISCONTINUED | OUTPATIENT
Start: 2025-03-24 | End: 2025-03-28 | Stop reason: HOSPADM

## 2025-03-24 RX ORDER — CEFTRIAXONE 1 G/1
1 INJECTION, POWDER, FOR SOLUTION INTRAMUSCULAR; INTRAVENOUS EVERY 24 HOURS
Status: DISCONTINUED | OUTPATIENT
Start: 2025-03-25 | End: 2025-03-26

## 2025-03-24 RX ADMIN — ESCITALOPRAM 10 MG: 5 TABLET, FILM COATED ORAL at 09:50

## 2025-03-24 RX ADMIN — POTASSIUM CHLORIDE 30 MEQ: 1500 TABLET, EXTENDED RELEASE ORAL at 09:51

## 2025-03-24 RX ADMIN — EMPAGLIFLOZIN 10 MG: 10 TABLET, FILM COATED ORAL at 09:52

## 2025-03-24 RX ADMIN — APIXABAN 5 MG: 5 TABLET, FILM COATED ORAL at 09:51

## 2025-03-24 RX ADMIN — CEFTRIAXONE 1 G: 1 INJECTION, POWDER, FOR SOLUTION INTRAMUSCULAR; INTRAVENOUS at 16:09

## 2025-03-24 RX ADMIN — BUMETANIDE 2 MG: 0.25 INJECTION INTRAMUSCULAR; INTRAVENOUS at 09:56

## 2025-03-24 RX ADMIN — SPIRONOLACTONE 12.5 MG: 25 TABLET, FILM COATED ORAL at 09:51

## 2025-03-24 RX ADMIN — PIPERACILLIN AND TAZOBACTAM 3.38 G: 3; .375 INJECTION, POWDER, FOR SOLUTION INTRAVENOUS at 09:56

## 2025-03-24 RX ADMIN — BUMETANIDE 2 MG: 0.25 INJECTION INTRAMUSCULAR; INTRAVENOUS at 01:11

## 2025-03-24 RX ADMIN — PIPERACILLIN AND TAZOBACTAM 3.38 G: 3; .375 INJECTION, POWDER, FOR SOLUTION INTRAVENOUS at 01:02

## 2025-03-24 RX ADMIN — APIXABAN 5 MG: 5 TABLET, FILM COATED ORAL at 20:09

## 2025-03-24 RX ADMIN — SIMVASTATIN 10 MG: 10 TABLET, FILM COATED ORAL at 20:09

## 2025-03-24 ASSESSMENT — ACTIVITIES OF DAILY LIVING (ADL)
ADLS_ACUITY_SCORE: 65
ADLS_ACUITY_SCORE: 64
ADLS_ACUITY_SCORE: 64
ADLS_ACUITY_SCORE: 69
ADLS_ACUITY_SCORE: 64
ADLS_ACUITY_SCORE: 65
ADLS_ACUITY_SCORE: 65
ADLS_ACUITY_SCORE: 64
ADLS_ACUITY_SCORE: 64
ADLS_ACUITY_SCORE: 65
ADLS_ACUITY_SCORE: 64
ADLS_ACUITY_SCORE: 65
DEPENDENT_IADLS:: CLEANING;LAUNDRY;TRANSPORTATION
ADLS_ACUITY_SCORE: 64
ADLS_ACUITY_SCORE: 65
ADLS_ACUITY_SCORE: 65
ADLS_ACUITY_SCORE: 64
ADLS_ACUITY_SCORE: 64
ADLS_ACUITY_SCORE: 65
ADLS_ACUITY_SCORE: 65
ADLS_ACUITY_SCORE: 64
ADLS_ACUITY_SCORE: 64
ADLS_ACUITY_SCORE: 69
ADLS_ACUITY_SCORE: 65

## 2025-03-24 NOTE — PROGRESS NOTES
"Call to KINDRA Benites, with [facility] made for Nurse to Nurse report at 1800.  No further questions, per Delmis.  Coordinator KINDRA Vanegas, kept abreast of patient's status, with Guilherme communicating with this RN and [facility] RN prior to transition.  [facility] (stretcher) has arrived and will be providing transportation to patient's home.  R PIV saline locked and left in place.  VSS Patient is A&O x4 BP 98/56 (BP Location: Left arm)   Pulse 78   Temp 98.9  F (37.2  C) (Oral)   Resp 20   Ht 1.626 m (5' 4\")   Wt 127.9 kg (282 lb)   LMP 03/03/2025 (Within Days)   SpO2 (!) 91%   BMI 48.41 kg/m    Mary Jo Daily RN     "

## 2025-03-24 NOTE — PROGRESS NOTES
[facility]    Medicine Progress Note - Hospitalist Service    Date of Admission:  3/22/2025    Assessment & Plan      49 year old female with a past medical history of multiple sclerosis, chronic lymphedema with recurrent cellulitis, diastolic CHF, chronic resp failure on 3 L NC oxygen  just discharged home from TCU February 28 who came into ED for further work up and eval of dysuria, shortness of breath, fevers, weakness with ambulation.  Patient found to have elevated troponin, findings of volume overload, concern for sepsis from urinary source as well as possible acute cellulitis and admitted.    Sepsis due to likely UTI, resolved: Diagnosis based on Temp > 38 C, HR > 90 bpm, RR > 20 breaths/min, and WBC > 12 due to infection.       -was given IVF; LA normal  - UA abnormal  -UCx with GNB  --BC, NGTD  -continue IV Zosyn, due to previously seen Enterococcus   ---check PVRs  ---with weakness get pT to assess      Acute on chronic respiratory failure with hypoxemia   --- Improved   ---suspect related to volume overload as seen on chest x-ray, possible PE as evidenced by DVT  --- Was on BiPAP  --- Continue diuretics  --- Appreciate cardiology following; transitioning to oral diuretic today    Chronic lymphedema  DVT  --- Ultrasound showed short segment DVT and left great saphenous vein  --- Started on DOAC  --- Lymphedema care ordered    Acute on chronic respiratory failure  Morbid obesity  Suspected obesity hypoventilation syndrome  Pulmonary hypertension  -she follows with pulm       Acute on chronic respiratory failure  ADHFpEF  Elevated troponinType 2 MI/Demand Ischemia due to Heart failure /sepsis  --- Was on BiPAP again at night  --- Suspect hypoxia from acute CHF  --- elevated BNP, fine rales on examination , Unresolved dyspnea, weight gain and chest x-ray suggest congestive heart failure   -  echocardiogram with recent normal EF  --Cards following  --- Was on IV diuresis with  "Bumex, today transitioned to oral per cardiology monitor inputs outputs and daily weights.  --- Continue Jardiance, Zocor, Aldactone  ----Monitor on telemetry.  --- Continue sodium and fluid restriction- 2g Na and *1500 c fluid restriction  --- Monitor electrolytes    Mood d/o  - Lexapro, will continue            Diet: Combination Diet Regular Diet Adult; 2 gm NA Diet  Fluid restriction 1500 ML FLUID    DVT Prophylaxis: DOAC  Brar Catheter: Not present  Lines: None     Cardiac Monitoring: ACTIVE order. Indication: Acute decompensated heart failure (48 hours)  Code Status: Full Code      Clinically Significant Risk Factors          # Hypochloremia: Lowest Cl = 97 mmol/L in last 2 days, will monitor as appropriate       # Coagulation Defect: INR = 1.30 (Ref range: 0.85 - 1.15) and/or PTT = N/A, will monitor for bleeding    # Hypertension: Noted on problem list  # Acute heart failure with preserved ejection fraction: heart failure noted on problem list, last echo with EF >50%, and receiving IV diuretics           # Severe Obesity: Estimated body mass index is 48.41 kg/m  as calculated from the following:    Height as of this encounter: 1.626 m (5' 4\").    Weight as of this encounter: 127.9 kg (282 lb)., PRESENT ON ADMISSION     # Financial/Environmental Concerns:           Social Drivers of Health    Food Insecurity: High Risk (3/23/2025)    Food Insecurity     Within the past 12 months, did you worry that your food would run out before you got money to buy more?: Yes     Within the past 12 months, did the food you bought just not last and you didn t have money to get more?: No   Transportation Needs: High Risk (3/23/2025)    Transportation Needs     Within the past 12 months, has lack of transportation kept you from medical appointments, getting your medicines, non-medical meetings or appointments, work, or from getting things that you need?: Yes   Physical Activity: Inactive (3/21/2024)    Exercise Vital Sign     " Days of Exercise per Week: 0 days     Minutes of Exercise per Session: 0 min   Interpersonal Safety: High Risk (3/23/2025)    Interpersonal Safety     Do you feel physically and emotionally safe where you currently live?: No     Within the past 12 months, have you been hit, slapped, kicked or otherwise physically hurt by someone?: No     Within the past 12 months, have you been humiliated or emotionally abused in other ways by your partner or ex-partner?: No   Social Connections: Unknown (3/1/2022)    Social Connection and Isolation Panel [NHANES]     Frequency of Communication with Friends and Family: Twice a week     Frequency of Social Gatherings with Friends and Family: Twice a week          Disposition Plan     Medically Ready for Discharge: Anticipated in 2-4 Days             Radha Walden MD  Hospitalist Service  [facility]  Securely message with [employer] (more info)  Text page via [employer] Paging/Directory   ______________________________________________________________________    Interval History   --- Overall she is feeling better.  --- Struggles with constipation  --- Has a chronic wound on the back of her left leg that was draining when she was at home.  --- Denies dysuria to me but does state that her urine was somewhat cloudy and she felt some vaginal itching.  Somewhat equivocal about discharge    Physical Exam   Vital Signs: Temp: 97.1  F (36.2  C) Temp src: Axillary BP: 111/57 Pulse: 84   Resp: 20 SpO2: 95 % O2 Device: BiPAP/CPAP Oxygen Delivery: 3 LPM  Weight: 282 lbs 0 oz    General Appearance: Female apparent distress; BiPAP was on when I was in the room  Respiratory: Clear to auscultation bilaterally  Cardiovascular: Sounds regular  GI: Obese soft and nontender  Skin: She has significant chronic bullae and brawny edema.  Significant bilateral lymphedema changes noted.  Other: Neurologically gross intact without focal deficits appreciated     Medical Decision Making              Data     I have personally reviewed the following data over the past 24 hrs:    7.4  \   13.4   / 151     138 102 22.1 (H) /  91   3.8; 3.9 28 1.01 (H) \     ALT: 9 AST: 18 AP: 72 TBILI: 2.8 (H)   ALB: 3.1 (L) TOT PROTEIN: 6.9 LIPASE: N/A     Procal: N/A CRP: 142.90 (H) Lactic Acid: N/A         Imaging results reviewed over the past 24 hrs:   No results found for this or any previous visit (from the past 24 hours).

## 2025-03-24 NOTE — CONSULTS
Care Management Initial Consult    General Information  Assessment completed with: Patient, pt  Type of CM/SW Visit: Initial Assessment    Primary Care Provider verified and updated as needed: Yes   Readmission within the last 30 days: current reason for admission unrelated to previous admission   Return Category: Progression of disease  Reason for Consult: discharge planning  Advance Care Planning: Advance Care Planning Reviewed: no concerns identified     General Information Comments: lives alone and has [facility] for RN/PT/HHA    Communication Assessment  Patient's communication style: spoken language (English or Bilingual)    Hearing Difficulty or Deaf: no   Wear Glasses or Blind: yes (whikle driving)    Cognitive  Cognitive/Neuro/Behavioral: WDL                      Living Environment:   People in home: alone     Current living Arrangements: town home      Able to return to prior arrangements: yes       Family/Social Support:  Care provided by: self, homecare agency ([facility])  Provides care for: no one  Marital Status: Single  Support system: Friend          Description of Support System: Supportive, Involved    Support Assessment: Adequate family and caregiver support, Adequate social supports    Current Resources:   Patient receiving home care services: Yes  Skilled Home Care Services: Skilled Nursing, Home Health Aid, Physical Therapy ([facility])     Community Resources: Home Care  Equipment currently used at home: wheelchair, manual, walker, rolling  Supplies currently used at home: Incontinence Supplies, Oxygen Tubing/Supplies, Other    Employment/Financial:  Employment Status: disabled        Financial Concerns: none   Referral to Financial Worker: No       Does the patient's insurance plan have a 3 day qualifying hospital stay waiver?  No    Lifestyle & Psychosocial Needs:  Social Drivers of Health     Food Insecurity: High Risk (3/23/2025)    Food Insecurity     Within the past 12 months,  did you worry that your food would run out before you got money to buy more?: Yes     Within the past 12 months, did the food you bought just not last and you didn t have money to get more?: No   Depression: Not at risk (10/2/2024)    PHQ-2     PHQ-2 Score: 2   Housing Stability: Low Risk  (3/23/2025)    Housing Stability     Do you have housing? : Yes     Are you worried about losing your housing?: No   Tobacco Use: Low Risk  (3/17/2025)    Patient History     Smoking Tobacco Use: Never     Smokeless Tobacco Use: Never     Passive Exposure: Not on file   Financial Resource Strain: Low Risk  (3/23/2025)    Financial Resource Strain     Within the past 12 months, have you or your family members you live with been unable to get utilities (heat, electricity) when it was really needed?: No   Alcohol Use: Not on file   Transportation Needs: High Risk (3/23/2025)    Transportation Needs     Within the past 12 months, has lack of transportation kept you from medical appointments, getting your medicines, non-medical meetings or appointments, work, or from getting things that you need?: Yes   Physical Activity: Inactive (3/21/2024)    Exercise Vital Sign     Days of Exercise per Week: 0 days     Minutes of Exercise per Session: 0 min   Interpersonal Safety: High Risk (3/23/2025)    Interpersonal Safety     Do you feel physically and emotionally safe where you currently live?: No     Within the past 12 months, have you been hit, slapped, kicked or otherwise physically hurt by someone?: No     Within the past 12 months, have you been humiliated or emotionally abused in other ways by your partner or ex-partner?: No   Stress: No Stress Concern Present (12/4/2020)    [employer] - Occupational Stress Questionnaire     Feeling of Stress : Only a little   Social Connections: Unknown (3/1/2022)    Social Connection and Isolation Panel [NHANES]     Frequency of Communication with Friends and Family: Twice  a week     Frequency of Social Gatherings with Friends and Family: Twice a week     Attends Latter-day Services: Not on file     Active Member of Clubs or Organizations: Not on file     Attends Club or Organization Meetings: Not on file     Marital Status: Not on file   Health Literacy: Not on file       Functional Status:  Prior to admission patient needed assistance:   Dependent ADLs:: Ambulation-walker, Wheelchair-independent  Dependent IADLs:: Cleaning, Laundry, Transportation  Assesssment of Functional Status: Not at baseline with ADL Functioning    Mental Health Status:  Mental Health Status: No Current Concerns       Chemical Dependency Status:                Values/Beliefs:  Spiritual, Cultural Beliefs, Latter-day Practices, Values that affect care:    Description of Beliefs that Will Affect Care: TO HAVE HOPE            Discussed  Partnership in Safe Discharge Planning  document with patient/family: No    Additional Information:  L[facility] alone [facility] and [facility] (RN see her twice a week for wounds) and will need to add OT. Pt states she has a method of getting people into her house via garage  and then her door is no locked, pt also uses walker and has home oxygen at 3L NC continuous. Pt requesting [facility] WC ride at discharge.     Next Steps: final discharge plans, transport order, resume home care and add OT    Charo Flower RN

## 2025-03-24 NOTE — PROGRESS NOTES
Occupational Therapy      03/24/25 1410   Appointment Info   Signing Clinician's Name / Credentials (OT) Amanda Obregon OTR/L   Quick Adds   Quick Adds Edema   Living Environment   People in Home alone   Current Living Arrangements house   Home Accessibility other (see comments)  (ramped entrance)   Living Environment Comments patient has home care to assist with bathing, PT 1x/wk, RN for wounds (not wraps)   Self-Care   Equipment Currently Used at Home commode chair;grab bar, toilet;grab bar, tub/shower;shower chair;walker, rolling;wheelchair, manual;other (see comments)   Activity/Exercise/Self-Care Comment Patient has been home~3 weeks from TCU, prior to that patient was hospitalized following a fall. Patient ambulatesl short distance with FWW (~20 feet) otherwise uses WC for mobility. Patient sleeps in recliner   General Information   Onset of Illness/Injury or Date of Surgery 03/22/25   Referring Physician Radha Walden MD   Patient/Family Therapy Goal Statement (OT) get home   Additional Occupational Profile Info/Pertinent History of Current Problem 49 year old female with a past medical history of multiple sclerosis, HTN, anxiety, depression, cellulitis, CHF, who presents with dysuria. + Fever/hematuria, baseline O2 3L NC with SOB, mobility issues The patient reports she may have a urinary tract infection given her urine was cloudy. She is also concerned for sepsis because the wound on the left lower extremity is draining and she is having a fever. She was just discharged home from TCU as of recent. The patient notes she is normally able to ambulate, but she has been having increase difficulty in getting around.   Existing Precautions/Restrictions fall;oxygen therapy device and L/min  (wears 3L @ home)   Cognitive Status Examination   Orientation Status orientation to person, place and time   Edema General Information   Onset of Edema   (years+)   Affected Body Part(s) Left LE;Right LE   Edema Etiology Other  (see comments)  (patient has lymphedema bilaterally)   Edema Precautions Cardiac Edema/CHF   Edema Examination/Assessment   Skin Condition Non-pitting;Venous distention;Dryness;Hemosiderin deposits;Lipodermatosclerosis   Ulcerations Yes  (WOC invovled)   Stemmer Sign Positive   Fibrosis Assessment + bilaterally   Range of Motion Comprehensive   General Range of Motion no range of motion deficits identified   Strength Comprehensive (MMT)   General Manual Muscle Testing (MMT) Assessment no strength deficits identified   Bed Mobility   Bed Mobility supine-sit;sit-supine   Supine-Sit [place] (Bed Mobility) maximum assist (25% patient effort)   Sit-Supine [place] (Bed Mobility) maximum assist (25% patient effort)   Comment (Bed Mobility) A for LEs   Transfers   Transfers sit-stand transfer   Sit-Stand Transfer   Sit-Stand [place] (Transfers) contact guard   Sit/Stand Transfer Comments patient ambulated in room with FWW and CGA, fatigues quickly, oxgyen decreases to 85% with activity   Balance   Balance Comments Dependent for LE dressing   Clinical Impression   Criteria for Skilled Therapeutic Interventions Met (OT) Yes, treatment indicated   OT Diagnosis Decreased ADL independence   Edema: Patient Presentation Stage 3 Lymphedema   OT Problem List-Impairments impacting ADL problems related to;activity tolerance impaired   Assessment of Occupational Performance 1-3 Performance Deficits   Identified Performance Deficits endurance, dressing   Planned Therapy Interventions (OT) ADL retraining;transfer training   Edema: Planned Interventions Education   Clinical Decision Making Complexity (OT) problem focused assessment/low complexity   Risk & Benefits of therapy have been explained evaluation/treatment results reviewed;care plan/treatment goals reviewed   OT Total Evaluation Time   OT Eval, Low Complexity Minutes (78668) 15   Self-Care/Home Management   Self-Care/Home Mgmt/ADL, Compensatory, Meal Prep Minutes  (36994) 10   Treatment Detail/Skilled Intervention Lymph eval: patient with significant edema, has home compression that fits. However, patient needs extensive lymphedema treatment. Patient appropriate for outpatient lymphedema, patient's lymphedema is chronic not acute. Acute care not able to manage severity of chronic edmea in hospital.   OT Discharge Planning   OT Plan Dressing, trsfs, toielting   OT Discharge Recommendation (DC Rec) home with home care occupational therapy;other (see comments)   OT Rationale for DC Rec Patient is likely close to her baseline ADL level. However, patient has had a decline in her ADL performance over time and likely needs to consider more supportive environment in the near future such as assisted living. Patient not likely to benefit from TCU as patient is close her baseline and therefore, unlikely to make signfiicant gains in ADL safety and in fact, will likely decrease in function due to MS diagnosis over time.   OT Brief overview of current status CGA for ADLs   OT Total Distance Amb During Session (feet) 10   Total Session Time   Timed Code Treatment Minutes 10   Total Session Time (sum of timed and untimed services) 25

## 2025-03-24 NOTE — PROGRESS NOTES
HEART CARE NOTE          Assessment/Recommendations   1. HFpEF/RV dysfunction c/b severe ADHF  Assessment / Plan  Transition to oral diuretic regimen and continue to monitor UOP and renal function closely   Patient is at increased risk for adverse cardiac events 2/2 non-compliance, morbid obesity, renal dysfunction     2. HUNG in CKD  Assessment / Plan  Resolving; Follows with [facility]  Diuresis as above; renal function wnl; continue to monitor UOP and renal function closely     3. HTN  Assessment / Plan   Adequately controlled- borderline BP - no further titrations of oral afterload reduction; no additional recommendations at this time     4. NARCISA  Assessment / Plan  CPAP at bedtime     5. PE c/b Core pulmonale + pulmonary HTN  Assessment / Plan  Hx of PE; not currently on AC - defer management to primary  C/b Core pulmonale; pulm Htn likely multifactorial given hx of PE and current WHO Group 2 2/2 volume overload    Plan of care discussed on March 24, 2025 with patient at bedside, and primary team overseeing patient's care      History of Present Illness/Subjective    Ms. Bess Enamorado is a 49 year old female with a PMHx significant for (per Epic notation) multiple sclerosis, hypertension, CHF who presents in ADHF     Today, Mrs. Enamorado denies acute cardiac events or complaints; Management plan as detailed above     ECG: Personally reviewed. normal sinus rhythm, nonspecific ST and T waves changes, incomplete RBBB, right axis deviation.     ECHO (personnaly Reviewed on 2/13/24):   1. Technically difficult study.  2. The left ventricle is normal in size.Image quality does not provide for  detailed assessment of LV systolic function, but is felt to be normal with a  visually estimated ejection fraction of roughly 65%.  3. There is abnormal septal motion c/w right ventricular overload.  4. Probable mild-moderate tricuspid insufficiency (the color Doppler tricuspid  insufficiency jet is poorly  "visualized).  5. There is severe right ventricular enlargement with severely reduced right  ventricular systolic performance.  6. The left atrium appears enlarged though difficult to quantify due to  inadequate visualization. There is severe right atrial enlargement.  7. Right ventricular systolic pressure relative to right atrial pressure is  moderate-severely increased. The pulmonary artery pressure is estimated to be  70-75 mmHg plus right atrial pressure. In addition, the IVC appears dilated  with decreased phasic variation in caval diameter consistent with elevated  right atrial pressure.     When compared to the prior real-time echocardiogram dated 19 July 2024, the  pulmonary artery pressure estimate measures slightly less on the current  study. The findings are otherwise felt to be fairly similar on both  examinations (although the right ventricular size was previously described as  only mildly enlarged, it is this reader's impression that right ventricular  size was severely increased on the prior study with concomitant severe  depression and right ventricular systolic performance).    Telemetry: personally reviewed March 24, 2025; notable for sinus      Lab results: personally reviewed March 24, 2025; notable for HUNG    Medical history and pertinent documents reviewed in Care Everywhere please where applicable see details above        Physical Examination Review of Systems   /57 (BP Location: Left arm)   Pulse 84   Temp 97.1  F (36.2  C) (Axillary)   Resp 20   Ht 1.626 m (5' 4\")   Wt 127.9 kg (282 lb)   LMP 03/03/2025 (Within Days)   SpO2 95%   BMI 48.41 kg/m    Body mass index is 48.41 kg/m .  Wt Readings from Last 3 Encounters:   03/22/25 127.9 kg (282 lb)   03/17/25 122 kg (269 lb)   02/25/25 124.8 kg (275 lb 1.6 oz)     General Appearance:   no distress, normal body habitus   ENT/Mouth: membranes moist, no oral lesions or bleeding gums.      EYES:  no scleral icterus, normal conjunctivae "   Neck: no carotid bruits or thyromegaly   Chest/Lungs:   lungs are clear to auscultation, no rales or wheezing, equal chest wall expansion    Cardiovascular:   Regular. Normal first and second heart sounds with no murmurs, rubs, or gallops; the carotid, radial and posterior tibial pulses are intact, mild LE edema bilaterally    Abdomen:  no organomegaly, masses, bruits, or tenderness; bowel sounds are present   Extremities: no cyanosis or clubbing   Skin: no xanthelasma, warm.    Neurologic: NAD     Psychiatric: alert and oriented x3, calm     A complete 10 systems ROS was reviewed  And is negative except what is listed in the HPI.          Medical History  Surgical History Family History Social History   Past Medical History:   Diagnosis Date    Acute on chronic diastolic congestive heart failure (H) 02/09/2022    Arthritis 2019    Hips    ASCUS favor benign 08/2015    Neg HPV    Depressive disorder 2010    H/O colposcopy with cervical biopsy 09/14/2015    Bx & ECC - negative    Hypertension 2019    LSIL on Pap smear 07/2014    Neg high risk HPV    Multiple sclerosis (H) 08/29/2005 9/18/200pt dx with MS 2004--dx by neurolgist and is followed by Dr. Harris    Myocardial infarction (H)     Obese     Pneumonia due to infectious organism, unspecified laterality, unspecified part of lung 02/08/2022    Sepsis (Temp 101, , WBC 13.0) 10/23/2018    Shingles 10/2016    SIRS (systemic inflammatory response syndrome) (H) 03/04/2021    Sleep apnea     UNSPEC CONSTIPATION 09/18/2006 9/18/2006   Has tried otc suppository and otc lax.     Past Surgical History:   Procedure Laterality Date    CHOLECYSTECTOMY, LAPOROSCOPIC      Cholecystectomy, Laparoscopic    COLONOSCOPY  2007?    Bathroom issue..results normal    COMBINED CYSTOSCOPY, RETROGRADES, EXCHANGE STENT URETER(S) Right 2/21/2022    Procedure: CYSTOSCOPY, WITH right RETROGRADE PYELOGRAM AND right URETERAL STENT PLACEMENT;  Surgeon: Doyle Palomares MD;   Location: [facility] [place] OR    OPEN REDUCTION INTERNAL FIXATION TOE(S)  4/13/2012    Procedure:OPEN REDUCTION INTERNAL FIXATION TOE(S); Open reduction internal fixation right proximal fifth metatarsal fracture-   Anes-choice block; Surgeon:LEY, JEFFREY DUANE; Location:[place] OR    PICC SINGLE LUMEN PLACEMENT  3/20/2024    PICC TRIPLE LUMEN PLACEMENT  2/21/2022         no family history of premature coronary artery disease Social History     Socioeconomic History    Marital status: Single     Spouse name: Not on file    Number of children: Not on file    Years of education: Not on file    Highest education level: Not on file   Occupational History     Employer: [employer]   Tobacco Use    Smoking status: Never    Smokeless tobacco: Never   Vaping Use    Vaping status: Never Used   Substance and Sexual Activity    Alcohol use: Yes     Comment: social    Drug use: No    Sexual activity: Not Currently     Partners: Male     Birth control/protection: Pill   Other Topics Concern    Parent/sibling w/ CABG, MI or angioplasty before 65F 55M? No   Social History Narrative    , 3rd-5th grade     Social Drivers of Health     Financial Resource Strain: Low Risk  (3/23/2025)    Financial Resource Strain     Within the past 12 months, have you or your family members you live with been unable to get utilities (heat, electricity) when it was really needed?: No   Food Insecurity: High Risk (3/23/2025)    Food Insecurity     Within the past 12 months, did you worry that your food would run out before you got money to buy more?: Yes     Within the past 12 months, did the food you bought just not last and you didn t have money to get more?: No   Transportation Needs: High Risk (3/23/2025)    Transportation Needs     Within the past 12 months, has lack of transportation kept you from medical appointments, getting your medicines, non-medical meetings or appointments, work, or from getting things that you need?:  Yes   Physical Activity: Inactive (3/21/2024)    Exercise Vital Sign     Days of Exercise per Week: 0 days     Minutes of Exercise per Session: 0 min   Stress: No Stress Concern Present (12/4/2020)    [employer] [place] [employer] - Occupational Stress Questionnaire     Feeling of Stress : Only a little   Social Connections: Unknown (3/1/2022)    Social Connection and Isolation Panel [NHANES]     Frequency of Communication with Friends and Family: Twice a week     Frequency of Social Gatherings with Friends and Family: Twice a week     Attends [employer] Services: Not on file     Active Member of Clubs or Organizations: Not on file     Attends Club or Organization Meetings: Not on file     Marital Status: Not on file   Interpersonal Safety: High Risk (3/23/2025)    Interpersonal Safety     Do you feel physically and emotionally safe where you currently live?: No     Within the past 12 months, have you been hit, slapped, kicked or otherwise physically hurt by someone?: No     Within the past 12 months, have you been humiliated or emotionally abused in other ways by your partner or ex-partner?: No   Housing Stability: Low Risk  (3/23/2025)    Housing Stability     Do you have housing? : Yes     Are you worried about losing your housing?: No           Lab Results    Chemistry/lipid CBC Cardiac Enzymes/BNP/TSH/INR   Lab Results   Component Value Date    CHOL 81 02/12/2025    HDL 11 (L) 02/12/2025    TRIG 134 02/12/2025    BUN 22.9 (H) 03/23/2025     03/23/2025    CO2 26 03/23/2025    Lab Results   Component Value Date    WBC 12.0 (H) 03/23/2025    HGB 14.6 03/23/2025    HCT 45.5 03/23/2025    MCV 92 03/23/2025     (L) 03/23/2025    Lab Results   Component Value Date    TROPONINI 0.03 04/06/2022     (H) 04/06/2022    TSH 3.52 02/12/2025    INR 1.30 (H) 03/23/2025     Lab Results   Component Value Date    TROPONINI 0.03 04/06/2022          Weight:    Wt Readings from Last 3 Encounters:    03/22/25 127.9 kg (282 lb)   03/17/25 122 kg (269 lb)   02/25/25 124.8 kg (275 lb 1.6 oz)       Allergies  No Known Allergies      Surgical History  Past Surgical History:   Procedure Laterality Date    CHOLECYSTECTOMY, LAPOROSCOPIC      Cholecystectomy, Laparoscopic    COLONOSCOPY  2007?    Bathroom issue..results normal    COMBINED CYSTOSCOPY, RETROGRADES, EXCHANGE STENT URETER(S) Right 2/21/2022    Procedure: CYSTOSCOPY, WITH right RETROGRADE PYELOGRAM AND right URETERAL STENT PLACEMENT;  Surgeon: Doyle Palomares MD;  Location: [facility] [place] OR    OPEN REDUCTION INTERNAL FIXATION TOE(S)  4/13/2012    Procedure:OPEN REDUCTION INTERNAL FIXATION TOE(S); Open reduction internal fixation right proximal fifth metatarsal fracture-   Anes-choice block; Surgeon:LEY, JEFFREY DUANE; Location:[place] OR    PICC SINGLE LUMEN PLACEMENT  3/20/2024    PICC TRIPLE LUMEN PLACEMENT  2/21/2022            Social History  Tobacco:   History   Smoking Status    Never   Smokeless Tobacco    Never    Alcohol:   Social History    Substance and Sexual Activity      Alcohol use: Yes        Comment: social   Illicit Drugs:   History   Drug Use No       Family History  Family History   Problem Relation Age of Onset    Neurologic Disorder Father         MS    Heart Disease Father         irregular heart rate    Diabetes Father     Melanoma Father     Sleep Apnea Father     Other Cancer Father     Cancer Maternal Grandfather         lung    Other Cancer Maternal Grandfather     Cancer Paternal Grandmother         stomach    Other Cancer Paternal Grandmother     Cancer Mother     Other Cancer Mother     Thyroid Disease Mother     Gynecology Maternal Aunt         PCOS    Hypertension Maternal Grandmother     Anxiety Disorder Maternal Grandmother     Thyroid Disease Maternal Grandmother     Anxiety Disorder Sister           Raúl Sanchez MD on 3/24/2025      cc: Mikala Luna

## 2025-03-24 NOTE — PLAN OF CARE
Problem: Pain Acute  Goal: Optimal Pain Control and Function  Outcome: Progressing  Intervention: Prevent or Manage Pain  Recent Flowsheet Documentation  Taken 3/23/2025 2030 by Ed Gabriel RN  Medication Review/Management: medications reviewed     Problem: Adult Inpatient Plan of Care  Goal: Optimal Comfort and Wellbeing  Outcome: Progressing     Problem: VTE (Venous Thromboembolism)  Goal: Tissue Perfusion  Intervention: Optimize Tissue Perfusion     Goal Outcome Evaluation:    A&O x4. Pleasant. Pt on 3L NC, on bipap overnight. IV SL, scheduled IV antibiotics. Purewick in place. Assist of 2. Tele SR. 1500 ml fluid restriction 2 g na diet. Denies pain. Able to make needs known.

## 2025-03-24 NOTE — PLAN OF CARE
"  Problem: Adult Inpatient Plan of Care  Goal: Plan of Care Review  Description: The Plan of Care Review/Shift note should be completed every shift.  The Outcome Evaluation is a brief statement about your assessment that the patient is improving, declining, or no change.  This information will be displayed automatically on your shiftnote.  Outcome: Progressing  Goal: Patient-Specific Goal (Individualized)  Description: You can add care plan individualizations to a care plan. Examples of Individualization might be:  \"Parent requests to be called daily at 9am for status\", \"I have a hard time hearing out of my right ear\", or \"Do not touch me to wake me up as it startlesme\".  Outcome: Progressing  Goal: Absence of Hospital-Acquired Illness or Injury  Outcome: Progressing  Intervention: Identify and Manage Fall Risk  Recent Flowsheet Documentation  Taken 3/23/2025 1700 by Stephanie Byrd RN  Safety Promotion/Fall Prevention: activity supervised  Intervention: Prevent Infection  Recent Flowsheet Documentation  Taken 3/23/2025 1700 by Stephanie Byrd RN  Infection Prevention: hand hygiene promoted  Goal: Optimal Comfort and Wellbeing  Outcome: Progressing  Goal: Readiness for Transition of Care  Outcome: Progressing     Problem: Gas Exchange Impaired  Goal: Optimal Gas Exchange  Outcome: Progressing     Problem: Pain Acute  Goal: Optimal Pain Control and Function  Outcome: Progressing  Intervention: Prevent or Manage Pain  Recent Flowsheet Documentation  Taken 3/23/2025 1700 by Stephanie Byrd RN  Medication Review/Management: medications reviewed     Problem: Comorbidity Management  Goal: Maintenance of Heart Failure Symptom Control  Outcome: Progressing  Intervention: Maintain Heart Failure Management  Recent Flowsheet Documentation  Taken 3/23/2025 1700 by Stephanie Byrd RN  Medication Review/Management: medications reviewed  Goal: Blood Pressure in Desired Range  Outcome: Progressing  Intervention: Maintain Blood Pressure " Management  Recent Flowsheet Documentation  Taken 3/23/2025 1700 by Stephanie Byrd RN  Medication Review/Management: medications reviewed     Problem: UTI (Urinary Tract Infection)  Goal: Improved Infection Symptoms  Outcome: Progressing     Problem: VTE (Venous Thromboembolism)  Goal: Tissue Perfusion  Outcome: Progressing  Goal: Right Ventricular Function  Outcome: Progressing     Problem: Skin or Soft Tissue Infection  Goal: Absence of Infection Signs and Symptoms  Outcome: Progressing  Intervention: Minimize and Manage Infection Progression  Recent Flowsheet Documentation  Taken 3/23/2025 1700 by Stephanie Byrd RN  Infection Prevention: hand hygiene promoted  Isolation Precautions: contact precautions maintained   Goal Outcome Evaluation:       Alert and oriented. C/o of achy back. L leg sore when moved. BP 95/54-held Bumex per parameters.

## 2025-03-24 NOTE — CONSULTS
[facility] Nurse Inpatient Assessment     Consulted for: Multiple    Summary: Patient pointed out wounds to back of L knee, Back of L thigh, Gluteal area    [facility] nurse follow-up plan: weekly    Patient History (according to provider note(s):         Principal Problem:    Acute on chronic respiratory failure with hypoxemia (H)  Active Problems:    Multiple sclerosis (H)    Leg edema    Class 3 severe obesity due to excess calories with serious comorbidity and body mass index (BMI) of 50.0 to 59.9 in adult (H)    Acute on chronic diastolic congestive heart failure (H)    Recurrent UTI's    Obesity hypoventilation syndrome (H)    Pulmonary hypertension (H)    Shortness of breath    Acute pulmonary edema (H)    Cystitis    E. coli sepsis (H)     49 year old female with a past medical history of multiple sclerosis, HTN, anxiety, depression, cellulitis, CHF, who presents with dysuria. + Fever/hematuria, baseline O2 3L NC with SOB, mobility issues The patient reports she may have a urinary tract infection given her urine was cloudy. She is also concerned for sepsis because the wound on the left lower extremity is draining and she is having a fever. She was just discharged home from TCU as of recent. The patient notes she is normally able to ambulate, but she has been having increase difficulty in getting around. lABS - WBC HIGH, BNP high and UA+ for UTI  CXR-Somewhat low lung volumes. Elevation of the right hemidiaphragm. Enlarged heart. Prominent pulmonary vasculature suggestive of congestion. No focal lung consolidation, pleural effusion or pneumothorax.   spoke with Dr. aBtes, Cardiology. Discussed case and lab/imaging results thus far. Elevated troponin is likely due to fluid overload.    CT-IMPRESSION:   1.  No acute findings or inflammatory changes in the abdomen or pelvis.  2.  Mildly lobulated hepatic surface contour, suspicious for chronic hepatocellular disease.  Clinically she is  saturating well on BiPAP now placed in the ED, tachycardic tachypneic with soft tissue BP but does report good urine output with Bumex.  She has this chronic lymphedema in the legs.  She is fairly alert and reports improvement.  She has significant diuretics at home and will tailor her diuretics further based on her kidney function and clinical response.  She tentatively has been placed on Bumex every 8 hours history was taking significant Bumex at home as well.  Febrile 101 and hypertensive tachycardic to suggest mild fluid overload.  - IV antibiotics- empirically  given rocephin but will broaden to IV Zosyn, due to previously seen Enterococcus to cover broad-spectrum coverage for gram-positive negatives and anaerobes while  culture and sensitivities are pending    Assessment:      Wound location: LLE        3/24    Last photo: 3/24  Wound due to: Friction  Wound history/plan of care: patient atated the wound to the back of her thigh was from a pinch injury from readjusting in her new wheelchair. Patient stated the wound to the back of the knee is from strong cleansing during a bed bath.   Wound base: Thigh 100 % Dermis, posterior knee 100 % Epidermis     Palpation of the wound bed: normal      Drainage: small     Description of drainage: bloody     Measurements (length x width x depth, in cm): Posterior thigh 1  x 3.1  x  0.1 cm ; knee fold 1 x 6 x 0cm     Tunneling: N/A     Undermining: N/A  Periwound skin: Erythema- blanchable and Superficial erosion      Color: pink and red      Temperature: warm  Odor: mild  Pain: mild, tender  Pain interventions prior to dressing change: slow and gentle cares   Treatment goal: Drainage control, Infection control/prevention, Protection, and Promote epidermal migration  STATUS: initial assessment  Supplies ordered: ordered  interdry  _____________________________________________________________________________________________________________________________________________________________________________________________________________________________________________________________________________    Wound location: Gluteal cleft    3/24    Last photo: 3/24  Wound due to: Friction  Wound history/plan of care: patient stated she felt it rip going to the bathroom   Wound base: 100 % Epidermis     Palpation of the wound bed: normal      Drainage: small     Description of drainage: bloody     Measurements (length x width x depth, in cm): 0.5  x 2.3  x  0.1 cm      Tunneling: N/A     Undermining: N/A  Periwound skin: Hemosiderin staining      Color: normal and consistent with surrounding tissue and dusky      Temperature: normal   Odor: mild  Pain: mild, stinging  Pain interventions prior to dressing change: slow and gentle cares   Treatment goal: Bleeding control, Drainage control, Infection control/prevention, Protection, and Promote epidermal migration  STATUS: initial assessment  Supplies ordered: supplies stored on unit       Treatment Plan:     R posterior thigh wound & gluteal cleft: Every 3 days   Cleanse the area with NS and pat dry.  Apply No sting film barrier to periwound skin.  Cover wound with Mepilex 4x4  Change dressing Q 3 days.    Daily: R knee fold (can use for any other fold moisture issue)    Interdry(order#[id number]):    1.  Wash skin gently with vashe. Pat, do not rub.  2.  With clean scissors, cut enough fabric to cover the affected area, allowing for a minimum of 2 inches to extend beyond the skin fold for moisture evaporation.  3.  Lay a single layer of fabric in the skin fold, placing one edge into the base of the fold. Gently smooth the rest of the fabric over the skin, keeping it flat.  4.  Leave at least 2 inches of fabric exposed outside the skin fold.  5.  Secure the fabric in one of several ways: with the skin  fold, with a small amount of skin-friendly tape, or tucked under clothing.  6.  Remove the fabric before bathing and reuse it when finished. When removing, gently separate the skin fold and lift away.    Helpful Hints    1. InterDry  can be written on with a ballpoint pen. It may be helpful to label each piece of InterDry  with the date you started using it.  2.  Each piece of InterDry  may be used up to 5 days, depending on fabric soiling, odor, amount of moisture and general skin condition. Replace InterDry  if it becomes soiled with blood, urine or stool.  3.  Do not use creams, ointments, or powders with InterDry  as it may interfere with product efficacy.    Orders: Written    RECOMMEND PRIMARY TEAM ORDER: Lymphedema consult  Education provided: importance of repositioning, plan of care, Moisture management, and Off-loading pressure  Discussed plan of care with: Patient and Nurse  Notify [facility] if wound(s) deteriorate.  Nursing to notify the Provider(s) and re-consult the [facility] Nurse if new skin concern.    DATA:     Current support surface: Standard  Standard gel mattress (Isoflex)  Containment of urine/stool: Incontinence Protocol  BMI: Body mass index is 48.41 kg/m .   Active diet order: Orders Placed This Encounter      Combination Diet Regular Diet Adult; 2 gm NA Diet     Output: I/O last 3 completed shifts:  In: 1280 [P.O.:1180; IV Piggyback:100]  Out: 1400 [Urine:1400]     Labs:   Recent Labs   Lab 03/24/25  0706 03/24/25  0705 03/23/25  0713   ALBUMIN  --  3.1* 3.5   HGB 13.4  --  14.6   INR  --   --  1.30*   WBC 7.4  --  12.0*     Pressure injury risk assessment:   Sensory Perception: 3-->slightly limited  Moisture: 3-->occasionally moist  Activity: 1-->bedfast (patient is not)  Mobility: 2-->very limited  Nutrition: 3-->adequate  Friction and Shear: 2-->potential problem  Ben Score: 14    MYRIAM Ca RN CWOCN  Pager no longer in use, please contact through [employer] group: [facility]  [place]  [place] [facility]

## 2025-03-24 NOTE — TELEPHONE ENCOUNTER
[facility]     Note that Bess presented to ER 3/22 with dysuria, shortness of breath, fever, weakness and is being treated for ADHF and urosepsis; cardiology is following inpatient.    Will check chart again 3/26 to confirm that 4/15/25 CORE follow-up is still appropriate.    Future Appointments   Date Time Provider Department Center   3/24/2025  2:15 PM Bagdaquanl, Amnada N, OTR JNOCCT [facility]   3/24/2025  3:15 PM Thelma Peñaloza, PT JNPTHR [facility]   3/24/2025  3:30 PM BagLevi rogelyssa N, OTR JNOCCT [facility]   4/15/2025 12:55 PM Christin Holt APRN [facility]   4/22/2025 10:30 AM Yanet Pappas PA-C [facility]   6/11/2025  8:00 AM Onur Chaudhry MD [facility] Beam   6/25/2025 10:20 AM [address] [facility]   7/2/2025  2:30 PM  LAB [facility]   7/2/2025  3:30 PM Cyndie Joyner MD [facility]     Yesy Salinas RN BSN   1:42 PM 03/24/25  Nurse line M-F 8a-4p: [phone]

## 2025-03-24 NOTE — PROGRESS NOTES
Hospital at Home Coordination Note    Report received from Bria ARGUELLES    Diagnosis: SOB    Oxygen needs: Yes Currently Using 3 LPM    Scheduled/standing labs: Yes, per provider daily BMP    Therapy orders: None    DME needs:  currently uses O2    Ambulation status: RENU, per report patient fear of mvt with BLE friction and shear of the skin.     Lung sounds: diminished-CTA    IV medications:Yes IV Medication (Name, Dose, Directions): IV ceftriaxone Q24, last dose at 1600 on 3/24 1g. Next dose 3/25 at 1600.    TPN: No    IV access: Yes Peripheral     Pain: No    Wound Care: Yes: Location of wound: BLEs. [facility] nurse note- treatment plan with instructions on supplies     Tube Feeding: No     Food Insecurities: No. Patient on 1800 ml fluid restriction       Delmis Ibarra RN on 3/24/2025 at 6:10 PM

## 2025-03-24 NOTE — DISCHARGE INSTRUCTIONS
[facility] DISCHARGE INSTRUCTIONS:  R posterior thigh wound & gluteal cleft: Every 3 days   Cleanse the area with NS and pat dry.  Apply No sting film barrier to periwound skin.  Cover wound with Mepilex 4x4  Change dressing Q 3 days.     Daily: R knee fold (can use for any other fold moisture issue)     Interdry(order#[id number]):     1.  Wash skin gently with vashe. Pat, do not rub.  2.  With clean scissors, cut enough fabric to cover the affected area, allowing for a minimum of 2 inches to extend beyond the skin fold for moisture evaporation.  3.  Lay a single layer of fabric in the skin fold, placing one edge into the base of the fold. Gently smooth the rest of the fabric over the skin, keeping it flat.  4.  Leave at least 2 inches of fabric exposed outside the skin fold.  5.  Secure the fabric in one of several ways: with the skin fold, with a small amount of skin-friendly tape, or tucked under clothing.  6.  Remove the fabric before bathing and reuse it when finished. When removing, gently separate the skin fold and lift away.     Helpful Hints     1. InterDry  can be written on with a ballpoint pen. It may be helpful to label each piece of InterDry  with the date you started using it.  2.  Each piece of InterDry  may be used up to 5 days, depending on fabric soiling, odor, amount of moisture and general skin condition. Replace InterDry  if it becomes soiled with blood, urine or stool.  3.  Do not use creams, ointments, or powders with InterDry  as it may interfere with product efficacy.

## 2025-03-24 NOTE — PLAN OF CARE
Lymphedema Discharge Summary    Reason for therapy discharge:    Not appropriate for acute care lymphedema services.      Therapy recommendation(s):    Patient has extensive chronic lymphedema. In order to effectively treat lymphedema, patient needs extensive outpatient treatment. Any treatment in the hospital is unlikely to make improvements in edema. Will discontinue lymphedema orders.

## 2025-03-24 NOTE — PROGRESS NOTES
ACUTE HOSPITAL AT HOME TRANSFER NOTE     Patient transferred to [facility] today. Transfer was discussed and coordinated with Dr. Walden. Please see hospital note regarding full A/P. Hospital at home was discussed on today two occasions with patient to confirm patient's interest. Conversation also had with RN case manager when considering transfer.     Orders and transfer details:   Sepsis due to UTI, resolved  UTI  Diagnosis based on Temp > 38 C, HR > 90 bpm, RR > 20 breaths/min, and WBC > 12 due to infection.  UA abnormal- UCx with G[facility]. Note-does have hx of e. Faecalis bacteremia d/t cellulitis.   CRP elevated 19 > 142, d/w attending, recheck in am   Follow UC: GNB growing  Follow BG: NGTD  Ok to transition to ceftriaxone given previous urine culture results-d/w attending  PT/OT- both saw prior to transfer and recommended home health       Acute on chronic respiratory failure with hypoxemia   Improved. Suspect related to volume overload/acute on chronic CHF as seen on chest x-ray, possible PE as evidenced by DVT. Was on Bipap, now back on baseline supplemental oxygen (3L).   Appreciate cardiology consult, transitioned to oral diuretic today   Continue DOAC as started below     DVT  Ultrasound showed short segment DVT and left great saphenous vein. Note history of PE Feb 2022 in setting of acute COVID, treated with xarelto (stopped after 3 months).   Started on Eliquis, continue     Chronic lymphedema  Lymphedema care ordered IP, recommended resuming OP lymphedema care    Morbid obesity  Obesity hypoventilation syndrome  Pulmonary hypertension  NARCISA  Chronic Hypoxia   She follows with pulm   - next @ 6/11 at 8 am   Continue supplemental O2 (3L), avoid hyperoxia   Continue home CPAP  Follow up w/ PCP for sleep study if has not done so since last admit    ADHFpEF  Elevated troponinType 2 MI/Demand Ischemia due to Heart failure /sepsis  Initially placed on bipap after admit, now on  home O2. Suspect hypoxia from acute CHF, improved after diuresis. Elevated BNP, fine rales on examination. Unresolved dyspnea, weight gain and chest x-ray suggest congestive heart failure. Echocardiogram 2/11/25 with normal EF, severe right ventricular enlargement w/ severely reduced right ventricular systolic function.   Appreciate cardiology consult, now on oral bumex   Daily weight if able  I&Os  Continue Jardiance, Zocor, Aldactone  Continue sodium and fluid restriction- 2g Na and *1800 c fluid restriction  Daily BMP    MS with ongoing leg weakness  mri of spine feb 2025. She is not on any med for MS, dx >20 years ago  Consider SW w/ home health on discharge     Mood disorder  Continue PTA lexapro    Expected Discharge Date: 3/26/25    I will be available until 17:00 today for questions regarding this patient. My colleague Sara Wong NP will be available this evening and overnight, then my colleague Sara Wong NP will see the patient tomorrow.  Verbal handoff completed.     Paulette Wilcox PA-C  [facility]     [phone]      HOME HOSPITAL TRANSFER Oxygen Documentation  I certify that this patient, Bess Enamorado has been under my care (or a nurse practitioner or physican's assistant working with me). This is the face-to-face encounter for oxygen medical necessity.      At the time of this encounter, I have reviewed the qualifying testing and have determined that supplemental oxygen is reasonable and necessary and is expected to improve the patient's condition in a home setting.         Patient has continued oxygen desaturation due to Chronic Respiratory Failure with Hypoxia J96.11  Pulmonary Hypertension I27.20.    If portability is ordered, is the patient mobile within the home? yes    Patient is currently inpatient and transferring to home for continued inpatient cares, where O2 needs will be continued to be assessed. Currently patient is on 3L and maintaining spO2  91-96% per RN assessment.

## 2025-03-24 NOTE — CONSULTS
.. HOSPITAL IN HOME    Liaison Communication     Patient: Bess Enamorado        MRN: [id number]  : 1975  Age: 49 year old     Hospital In Home service location and phone number:   [address] [place] [address]  [phone] (home)     Is this a secure facility and can you get to the door to receive visits i.e., medication delivery?     to schedule appointments: Patient   PREFERRED CONTACT PHONE NUMBER:  [phone]  Can the pt make a cell phone call in the home or have a cellular connection?Yes  Does the patient have WiFI in the home? Yes     (Patient residence must have cellular connection or WiFi to be admitted into the program):  yes [employer] wifi  Does the patient or a 24-hour caregiver have a smartphone with video capability that works inside the residence?  Yes   Patient caregiver during Home Hospital episode: Self and friend Majo  Patient consented to discuss care with caregiver: Yes  Verified caregiver willing/able to assist with patient cares and biometrics, if needed: Yes  Patient/caregiver able to  prescription medications after hospital discharge, if needed: Yes her friend Majo  Does patient have transportation to medical appointments, if needed: Yes    Patient was informed and in agreement that [facility] medical transportation is the required mode for transport home  Yes     Does patient have transportation to home and to medical appointments, if needed: yes    Primary care provider verified:  Mikala Luna   [phone]      Reason patient admitting to Home Hospital:  Lymphedema of both lower extremities, Cystitis,  Acute pulmonary edema   Hospital in Home consult provider that reviewed case: Paulette Wilcox NP  Admitting physician (Hospitalist): Radha Walden MD    Additional Medical Needs:   IV antibiotic, O2 increase use  (CHF, hx CHF pts or pts that need daily weights) DOES THE PATIENT HAVE A WORKING SCALE USED CONSISTENTLY TO OBTAIN DAILY WEIGHT IF NEEDED:  yes      Additional Care Management Needs:  Are there pets in the home:   No  Patient was informed that [place] policy is that all pets and fire arms are secured prior to our staff coming to your home:   Yes If patient is not agreeable to securing pets or firearms, the clinical team were notified.  Patient's Primary Language:  English   required:  Yes    Does patient have a home care agency yes providing services prior to admission?  Agency Name if applicable [facility] please resume after [facility]     Chart review completed. Met with  patient and her brother eFi to discuss purpose and process of [facility] Home. Answered patient/family questions regarding program. Patient verbally consents to Hospital in Home program: Yes    Guilherme Jennings RN  Hospital in Home Liaison

## 2025-03-25 ENCOUNTER — MEDICAL CORRESPONDENCE (OUTPATIENT)
Dept: HEALTH INFORMATION MANAGEMENT | Facility: CLINIC | Age: 50
End: 2025-03-25
Payer: COMMERCIAL

## 2025-03-25 PROBLEM — I82.402 DEEP VEIN THROMBOSIS (DVT) OF LEFT LOWER EXTREMITY (H): Status: ACTIVE | Noted: 2025-03-25

## 2025-03-25 LAB
ANION GAP SERPL CALCULATED.3IONS-SCNC: 9 MMOL/L (ref 7–15)
BUN SERPL-MCNC: 24.9 MG/DL (ref 6–20)
CALCIUM SERPL-MCNC: 10.2 MG/DL (ref 8.8–10.4)
CHLORIDE SERPL-SCNC: 99 MMOL/L (ref 98–107)
CREAT SERPL-MCNC: 0.97 MG/DL (ref 0.51–0.95)
CRP SERPL-MCNC: 104.1 MG/L
EGFRCR SERPLBLD CKD-EPI 2021: 71 ML/MIN/1.73M2
ERYTHROCYTE [DISTWIDTH] IN BLOOD BY AUTOMATED COUNT: 17.3 % (ref 10–15)
GLUCOSE SERPL-MCNC: 71 MG/DL (ref 70–99)
HCO3 SERPL-SCNC: 29 MMOL/L (ref 22–29)
HCT VFR BLD AUTO: 45.9 % (ref 35–47)
HGB BLD-MCNC: 14.9 G/DL (ref 11.7–15.7)
MCH RBC QN AUTO: 29.3 PG (ref 26.5–33)
MCHC RBC AUTO-ENTMCNC: 32.5 G/DL (ref 31.5–36.5)
MCV RBC AUTO: 90 FL (ref 78–100)
PLATELET # BLD AUTO: 165 10E3/UL (ref 150–450)
POTASSIUM SERPL-SCNC: 4.3 MMOL/L (ref 3.4–5.3)
RBC # BLD AUTO: 5.09 10E6/UL (ref 3.8–5.2)
SODIUM SERPL-SCNC: 137 MMOL/L (ref 135–145)
WBC # BLD AUTO: 5.4 10E3/UL (ref 4–11)

## 2025-03-25 PROCEDURE — 120N000001 HC R&B MED SURG/OB

## 2025-03-25 PROCEDURE — 85027 COMPLETE CBC AUTOMATED: CPT | Performed by: PHYSICIAN ASSISTANT

## 2025-03-25 PROCEDURE — 86140 C-REACTIVE PROTEIN: CPT | Performed by: PHYSICIAN ASSISTANT

## 2025-03-25 PROCEDURE — 84132 ASSAY OF SERUM POTASSIUM: CPT | Performed by: PHYSICIAN ASSISTANT

## 2025-03-25 PROCEDURE — 99233 SBSQ HOSP IP/OBS HIGH 50: CPT | Performed by: REGISTERED NURSE

## 2025-03-25 PROCEDURE — 250N000013 HC RX MED GY IP 250 OP 250 PS 637: Performed by: PHYSICIAN ASSISTANT

## 2025-03-25 PROCEDURE — 80048 BASIC METABOLIC PNL TOTAL CA: CPT | Performed by: PHYSICIAN ASSISTANT

## 2025-03-25 PROCEDURE — 250N000011 HC RX IP 250 OP 636: Performed by: PHYSICIAN ASSISTANT

## 2025-03-25 PROCEDURE — 99418 PROLNG IP/OBS E/M EA 15 MIN: CPT | Performed by: REGISTERED NURSE

## 2025-03-25 RX ADMIN — CEFTRIAXONE 1 G: 1 INJECTION, POWDER, FOR SOLUTION INTRAMUSCULAR; INTRAVENOUS at 16:45

## 2025-03-25 RX ADMIN — POTASSIUM CHLORIDE 30 MEQ: 1500 TABLET, EXTENDED RELEASE ORAL at 08:52

## 2025-03-25 RX ADMIN — EMPAGLIFLOZIN 10 MG: 10 TABLET, FILM COATED ORAL at 08:49

## 2025-03-25 RX ADMIN — Medication 12.5 MG: at 08:53

## 2025-03-25 RX ADMIN — BUMETANIDE 6 MG: 2 TABLET ORAL at 08:49

## 2025-03-25 RX ADMIN — ESCITALOPRAM 10 MG: 5 TABLET, FILM COATED ORAL at 08:49

## 2025-03-25 RX ADMIN — APIXABAN 5 MG: 5 TABLET, FILM COATED ORAL at 08:47

## 2025-03-25 RX ADMIN — APIXABAN 5 MG: 5 TABLET, FILM COATED ORAL at 20:58

## 2025-03-25 RX ADMIN — SIMVASTATIN 10 MG: 10 TABLET, FILM COATED ORAL at 20:58

## 2025-03-25 ASSESSMENT — ACTIVITIES OF DAILY LIVING (ADL)
ADLS_ACUITY_SCORE: 65
ADLS_ACUITY_SCORE: 56
ADLS_ACUITY_SCORE: 56
ADLS_ACUITY_SCORE: 65
ADLS_ACUITY_SCORE: 65
ADLS_ACUITY_SCORE: 56
ADLS_ACUITY_SCORE: 56
ADLS_ACUITY_SCORE: 65
ADLS_ACUITY_SCORE: 56
ADLS_ACUITY_SCORE: 65
ADLS_ACUITY_SCORE: 56
ADLS_ACUITY_SCORE: 65
ADLS_ACUITY_SCORE: 56
ADLS_ACUITY_SCORE: 56

## 2025-03-25 NOTE — PROGRESS NOTES
[facility]- COMMUNITY PARAMEDIC     Assessment Location: Home  Admit diagnosis:  Acute respiratory failure/ CHF  Admission date: 3/24/2025    Time arrived: 18:13  Time left: 20:20    Environment: patient resides Independent Has assist of Friend    Physical Assessment:  Vital signs:  Blood pressure 97/67  Pulse 79  Sats 96% on 3 lpm oxygen  Temperature 98.7  Weight 268.4      Physical assessment completed. Notable findings: Bess has a neighbor who will check on Bess daily and as Bess needs assistance.Bess has everything set up around her recliner she sleeps in which includes a camode, walker and wheel chair.    Activity: Walker     Medications:  Current medications: See MAR    New Medications: yes  Medication Access: Medications couriered to the patients home  Medications bundled and placed in a baggie for administration via telehealth visit with Inbound Triage: AM, PM, and PRN  Waiver lockbox left with patient:  yes  Medication pouches left: Yes: not used  Glucometer left with patient:  not needed  Medication Instructions reviewed with patient or caregiver No.  Further review needed: No      Additional Comments:  Understanding of illness: Yes  Safety concerns: No  Number of steps into home (accessibility for equipment): none  Are there pets in the home:  No  Patient informed that all pets and fire arms are secured prior to our staff coming into home: Yes.  If patient is not agreeable to securing pets or firearms, the clinical team is notified  Patient Vulnerable: No    InHome technology:  Patient/family demonstrates understanding of biometric kit use: Yes  Tablet connected to patient WiFiNo  Doxy.Me setup and demonstrated on patient smartphone No  Guided Access Mode to lock tablet: No    Clinical Coordination:  Patient advised of upcoming nurse and telehealth provider appointments: Yes  Provided [facility] triage phone number [phone]  Yes  Called and spoke with Inbound Triage [phone] for  handoff of patient care: Yes      ELENA BarreraP, CP on 3/24/2025 at 9:06 PM

## 2025-03-25 NOTE — PLAN OF CARE
Physical Therapy Discharge Summary    Reason for therapy discharge:    Discharged to home with home therapy.    Progress towards therapy goal(s). See goals on Care Plan in [facility] electronic health record for goal details.  Goals not met.  Barriers to achieving goals:   discharge from facility.    Therapy recommendation(s):    Continued therapy is recommended.  Rationale/Recommendations:  To improve mobility and strength. .

## 2025-03-25 NOTE — PLAN OF CARE
Occupational Therapy Discharge Summary    Reason for therapy discharge:    Discharged to Hospital at home    Progress towards therapy goal(s). See goals on Care Plan in [facility] electronic health record for goal details.  Goals not met.  Barriers to achieving goals:   discharge from facility.    Therapy recommendation(s):    Continued therapy is recommended.  Rationale/Recommendations:  To progress ADL's and address safety needs at home.

## 2025-03-25 NOTE — PLAN OF CARE
Goal Outcome Evaluation:      Plan of Care Reviewed With: patient    Overall Patient Progress: improvingOverall Patient Progress: improving         Medication administered. Wound care provided, IV flushed,  images sent to provider via secure means. Education provided. Patient left in stable condition in home, informed about nurses upcoming visit this afternoon.

## 2025-03-25 NOTE — PROGRESS NOTES
[facility]    Date of Service: 03/25/2025    Background:  Bess Enamorado is a 49 year old female who was admitted on 3/22/2025. Pt is enrolled in [facility] program     Interval History: Bess Enamorado is a 49 year old female with a past medical history of multiple sclerosis, HTN, anxiety, depression, cellulitis, CHF, who presented to ED with dysuria. ever/hematuria, baseline O2 3L NC with SOB, mobility issues The patient reports she may have a urinary tract infection given her urine was cloudy. She is also concerned for sepsis because the wound on the left lower extremity is draining and she is having a fever.     She was just discharged home from TCU as of recent. The patient notes she is normally able to ambulate, but she has been having increase difficulty in getting around.     She has this chronic lymphedema in the legs.  Empirically  given rocephin but will broaden to IV Zosyn, due to previously seen Enterococcus to cover broad-spectrum coverage for gram-positive negatives and anaerobes while  cultures are pending    Today, pt denies SOB, CP,; Slept well at home and is so happy to be part of home hospital program.  RN present during visit.   Chronic leg wounds; Has h/o struggles with constipation but had a large BM overnight. Has a chronic wound on the back of her left leg that was draining when she was at home. Denies dysuria, fever, or back pain.     Exam:   Vitals: Blood Pressure Heart Rate Pulse Ox Temperature Weight Glucose  03/25/2025  108/79  75  98  8:20:40 AM, Manual  98.9  263.4 lbs  3/24 weight 268 lbs  3/25 weight at home a.m. 263.4 lbs    GENERAL: alert, no distress, and obese  EYES: Eyes grossly normal to inspection.  No discharge or erythema, or obvious scleral/conjunctival abnormalities.  RESP: No audible wheeze, cough, or visible cyanosis.    SKIN: Visible skin clear. No significant rash, abnormal pigmentation or lesions.  SKIN: tattoo - lower legs,   Wound location: LLE  3/24        3/24     Last photo: 3/24  Wound due to: Friction  Wound history/plan of care: patient atated the wound to the back of her thigh was from a pinch injury from readjusting in her new wheelchair. Patient stated the wound to the back of the knee is from strong cleansing during a bed bath.   Wound base: Thigh 100 % Dermis, posterior knee 100 % Epidermis     Palpation of the wound bed: normal      Drainage: small     Description of drainage: bloody     Measurements (length x width x depth, in cm): Posterior thigh 1  x 3.1  x  0.1 cm ; knee fold 1 x 6 x 0cm     Tunneling: N/A     Undermining: N/A  Periwound skin: Erythema- blanchable and Superficial erosion      Color: pink and red      Temperature: warm    Wound location: Gluteal cleft    3/24     Last photo: 3/24  Wound due to: Friction  Wound history/plan of care: patient stated she felt it rip going to the bathroom   Wound base: 100 % Epidermis     Palpation of the wound bed: normal      Drainage: small     Description of drainage: bloody     Measurements (length x width x depth, in cm): 0.5  x 2.3  x  0.1 cm      Tunneling: N/A     Undermining: N/A  Periwound skin: Hemosiderin staining      Color: normal and consistent with surrounding tissue and dusky      Temperature: normal     3/25       Heel/foot eschar--was a blister and has turned to eschar over past 2 weeks         NEURO Mentation and speech appropriate for age.  PSYCH: Appropriate affect, tone, and pace of words    Data:    Recent Labs   Lab 03/24/25  0706 03/24/25  0705 03/23/25  0713 03/22/25  2149   WBC 7.4  --  12.0* 11.8*   HGB 13.4  --  14.6 15.7   MCV 91  --  92 89     --  134* 196   INR  --   --  1.30*  --    NA  --  138 138 137   POTASSIUM  --  3.9  3.8 4.0 4.1   CHLORIDE  --  102 101 97*   CO2  --  28 26 31*   BUN  --  22.1* 22.9* 23.3*   CR  --  1.01* 1.07* 0.89   ANIONGAP  --  8 11 9   EVITA  --  9.4 9.6 10.1   GLC  --  91 103* 103*   ALBUMIN  --  3.1* 3.5 4.1   PROTTOTAL  --   6.9 7.4 8.5*   BILITOTAL  --  2.8* 3.1* 2.6*   ALKPHOS  --  72 75 89   ALT  --  9 8 11   AST  --  18 21 23       Imaging:  Results for orders placed or performed during the hospital encounter of 03/22/25   CT Abdomen Pelvis w Contrast    Narrative    EXAM: CT ABDOMEN PELVIS W CONTRAST  LOCATION: [facility]  DATE: 3/22/2025    INDICATION: Abdominal pain, bloating, history of small bowel obstruction  COMPARISON: 7/18/2024  TECHNIQUE: CT scan of the abdomen and pelvis was performed following injection of IV contrast. Multiplanar reformats were obtained. Dose reduction techniques were used.  CONTRAST: 90ml isovue 370    FINDINGS:   LOWER CHEST: Unremarkable.    HEPATOBILIARY: Mildly lobulated hepatic surface contour. Status post cholecystectomy. No biliary dilatation.    PANCREAS: Normal.    SPLEEN: Normal.    ADRENAL GLANDS: Normal.    KIDNEYS/BLADDER: Few nonobstructing intrarenal calculi bilaterally measuring up to 0.3 cm. No hydronephrosis. Urinary bladder appears normal.    BOWEL: Moderate amount of stool throughout the colon. No obstruction or inflammatory change. Normal appendix. No evidence of acute appendicitis.    LYMPH NODES: Unchanged prominent but nonenlarged retroperitoneal lymph nodes. No new lymphadenopathy identified.    VASCULATURE: Mild atherosclerotic calcifications.    PELVIC ORGANS: Redemonstrated right ovarian dermoid cyst measuring 9.0 x 7.9 cm. Uterus and left adnexa appear unremarkable.    MUSCULOSKELETAL: Multilevel degenerative changes of the spine. Grade 1 anterolisthesis of L4 on L5.      Impression    IMPRESSION:   1.  No acute findings or inflammatory changes in the abdomen or pelvis.    2.  Mildly lobulated hepatic surface contour, suspicious for chronic hepatocellular disease.   XR Chest 1 View    Narrative    EXAM: XR CHEST 1 VIEW  LOCATION: [facility]  DATE: 3/23/2025    INDICATION: Dyspnea  COMPARISON: CT chest 2/11/2025. Chest  radiographs 7/22/2024.      Impression    IMPRESSION: Somewhat low lung volumes. Elevation of the right hemidiaphragm. Enlarged heart. Prominent pulmonary vasculature suggestive of congestion. No focal lung consolidation, pleural effusion or pneumothorax.   US Lower Extremity Venous Duplex Bilateral    Narrative    EXAM: ULTRASOUND VENOUS BILATERAL LOWER EXTREMITIES WITH DOPPLER  LOCATION: [facility]  DATE/TIME: 03/23/2025, 6:41 AM CDT    INDICATION: Swelling.  COMPARISON: None.    TECHNIQUE: Venous Duplex ultrasound of bilateral lower extremities with and without compression, augmentation and duplex. Color flow and spectral Doppler with waveform analysis performed.    FINDINGS: Exam includes the common femoral, femoral, popliteal veins as well as segmentally visualized deep calf veins and greater saphenous vein. This study is overall moderately limited due to large body habitus. Note that the evaluation of the calf   veins is prohibiting due to suboptimal visualization.    RIGHT: Normal compressibility of the common femoral, femoral, popliteal and great saphenous veins. Unremarkable Doppler waveform evaluation of the common femoral, femoral and popliteal veins.    LEFT: A short segment of a vein in the deep soft tissues of the mid left thigh appears noncompressible, suggesting occlusive thrombus. Normal compressibility of the common femoral, femoral and popliteal veins. Unremarkable Doppler waveform evaluation of   the common femoral, femoral and popliteal veins. 2.3 x 1.5 x 1.0 cm avascular hypoechoic structure in the deep soft tissues of the mid left thigh. This is nonspecific, could represent a small lymph node or cyst.      Impression    IMPRESSION:   1.  A short segment of a vein in the deep soft tissues of the mid left thigh appears noncompressible. This likely corresponds to a segment of age-indeterminate occlusive thrombus within the left greater saphenous vein.  2.  Note that this  study is overall moderately limited due to large body habitus. The calf veins were not able to be adequately evaluated. Otherwise, no convincing evidence of thrombus in the deep veins of the visualized portions of bilateral lower   extremities.         *Note: Due to a large number of results and/or encounters for the requested time period, some results have not been displayed. A complete set of results can be found in Results Review.       ASSESSMENT/PLAN:  Principal Problem:    Acute on chronic respiratory failure with hypoxemia (H)  Active Problems:    Multiple sclerosis (H)    Leg edema    Class 3 severe obesity due to excess calories with serious comorbidity and body mass index (BMI) of 50.0 to 59.9 in adult (H)    Acute on chronic diastolic congestive heart failure (H)    Recurrent UTI's    Obesity hypoventilation syndrome (H)    Pulmonary hypertension (H)    Shortness of breath    Acute pulmonary edema (H)    Cystitis    E. coli sepsis (H)     Cystitis with severe sepsis possibly E. coli sepsis (H)(in the past has grown E. coli in the urine as well as Enterococcus bacteremia)  Comment: Microbiological studies-  Blood cultures negative x 2   - IV antibiotics- empirically  given rocephin in ER but will broaden to IV Zosyn, due to previously seen Enterococcus to cover broad-spectrum coverage for gram-positive negatives and E coli UTI is sensitive to ceftriaxone,   Plan: reviewed with MD and will change to oral ABX tomorrow   Cont ABX  Watch labs     Acute on chronic respiratory failure with hypoxemia (H)  Class 3 severe obesity due to excess calories with serious comorbidity and body mass index (BMI) of 50.0 to 59.9 in adult (H) Obesity hypoventilation syndrome (H)  Pulm HTN, with chronic hypoxia  Shortness of breath  Acute pulmonary edema (H)  Comment: multifactorial acute resp failure , baseline 3 L NC   Likely due to sepsis, obesity hypoventilation, new DVT and Acute diastolic CHF as evidenced by cxr luid  overload.    Plan: tx with the diuretics and antibiotic  BMP improved  Follows with pulm here,    Out pt, PT eval and tx for lymphedema --has lymphedema wraps and elevated legs at home and sleeps in a recliner      Acute on chronic diastolic congestive heart failure (H)  Type 2 MI/Demand Ischemia due to Heart failure /sepsis  Comment: resolved dyspnea, weight down, x-ray suggest congestive heart failure likely consistent with the possible acute heart failure  -  echocardiogram with recent normal EF  Mid range  with SGLT2 I- Jardiance/Fraxiga  Plan: Provide IV diuresis-bumex 2 mg TID, monitor creat and K &Mag with it and will adjust accordingly.  Monitor inputs outputs and daily weights.  Sodium and fluid restriction- 2g Na and *1500 c fluid restriction with adjustment as needed for cardiorenal balance.       New DVT  Comment: on eliquis  H/o PE in remote past and was on xarelto; may need longer course of Eliquis--Plan: educated pt on this and will d/w MD length of DOAC       LE Wounds  Buttock Wound  Comment: chronic and was getting home care for wounds prior to admission   Plan: 1) L posterior thigh wound & gluteal cleft:no odor per RN  Plann: change drsg  Every 3 days   Cleanse the area with NS and pat dry.  Apply No sting film barrier to periwound skin.  Cover wound with Mepilex 4x4       2) Daily: R knee fold (can use for any other fold moisture issue)     1.  Wash skin gently with vashe. Pat, do not rub.  2.  With clean scissors, cut enough fabric to cover the affected area, allowing for a minimum of 2 inches to extend beyond the skin fold for moisture evaporation.  3.  Lay a single layer of fabric in the skin fold, placing one edge into the base of the fold. Gently smooth the rest of the fabric over the skin, keeping it flat.  4.  Leave at least 2 inches of fabric exposed outside the skin fold.  5.  Secure the fabric in one of several ways: with the skin fold, with a small amount of skin-friendly  tape, or tucked under clothing.  6.  Remove the fabric before bathing and reuse it when finished. When removing, gently separate the skin fold and lift away.     Helpful Hints     1. InterDry  can be written on with a ballpoint pen. It may be helpful to label each piece of InterDry  with the date you started using it.  2.  Each piece of InterDry  may be used up to 5 days, depending on fabric soiling, odor, amount of moisture and general skin condition. Replace InterDry  if it becomes soiled with blood, urine or stool.  3.  Do not use creams, ointments, or powders with InterDry  as it may interfere with product efficacy.        Mood d/o  Financial/Environmental Concerns:      Comment: lives alone; has a friend  Plan: may need SW consult to plug into resources     - Lexapro, will continue         IV/Lines: peripheral IV  DVT Prophylaxis: DOAC  Code Status: Full Code  Disposition: Anticipated in 2-4 Days    Telehealth Visit Details  Patient/Surrogate participated in the telehealth encounter via audiovisual communication. Identity of patient was verified. Verbal consent was provided for the visit by patient/surrogate.   Video Start Time: 0830  Video End Time: 0900  Originating Location (pt. location): Home  Distant Location (provider location): telehealth    TOTAL TIME 90 MINUTES FOR VISIT, INCLUDING REVIEWING CHART, LABS PENDING TODAY, REVIEWING IMAGING, REVIEWING PLAN WITH PT AND RN, REVIEWING CASE WITH MD Sara Wong CNP on 3/25/2025 at 10:41 AM      Sara Wong CNP  [facility]  [phone]

## 2025-03-26 PROCEDURE — 99233 SBSQ HOSP IP/OBS HIGH 50: CPT | Performed by: REGISTERED NURSE

## 2025-03-26 PROCEDURE — 250N000013 HC RX MED GY IP 250 OP 250 PS 637: Performed by: PHYSICIAN ASSISTANT

## 2025-03-26 PROCEDURE — 120N000001 HC R&B MED SURG/OB

## 2025-03-26 PROCEDURE — 250N000011 HC RX IP 250 OP 636: Performed by: PHYSICIAN ASSISTANT

## 2025-03-26 RX ORDER — NITROFURANTOIN 25; 75 MG/1; MG/1
100 CAPSULE ORAL EVERY 12 HOURS SCHEDULED
Status: DISCONTINUED
Start: 2025-03-27 | End: 2025-03-28 | Stop reason: HOSPADM

## 2025-03-26 RX ADMIN — POTASSIUM CHLORIDE 30 MEQ: 1500 TABLET, EXTENDED RELEASE ORAL at 08:53

## 2025-03-26 RX ADMIN — BUMETANIDE 6 MG: 2 TABLET ORAL at 08:53

## 2025-03-26 RX ADMIN — SIMVASTATIN 10 MG: 10 TABLET, FILM COATED ORAL at 20:11

## 2025-03-26 RX ADMIN — APIXABAN 5 MG: 5 TABLET, FILM COATED ORAL at 08:52

## 2025-03-26 RX ADMIN — ESCITALOPRAM 10 MG: 5 TABLET, FILM COATED ORAL at 08:53

## 2025-03-26 RX ADMIN — CEFTRIAXONE 1 G: 1 INJECTION, POWDER, FOR SOLUTION INTRAMUSCULAR; INTRAVENOUS at 16:05

## 2025-03-26 RX ADMIN — APIXABAN 5 MG: 5 TABLET, FILM COATED ORAL at 20:11

## 2025-03-26 RX ADMIN — EMPAGLIFLOZIN 10 MG: 10 TABLET, FILM COATED ORAL at 08:53

## 2025-03-26 RX ADMIN — Medication 12.5 MG: at 08:53

## 2025-03-26 ASSESSMENT — ACTIVITIES OF DAILY LIVING (ADL)
ADLS_ACUITY_SCORE: 56

## 2025-03-26 NOTE — PROGRESS NOTES
Home Hospital Care:    Reason for Visit: Medication Administration    Video visit with patient for medication administration. Successful self-administration of medications.   Most recent biometrics reviewed. Patient without acute concerns.  MAR updated.     Delmis Ibarra RN on 3/25/2025 at 8:58 PM

## 2025-03-26 NOTE — PLAN OF CARE
Goal Outcome Evaluation:         Pt A&Ox4, on 3 lpm via nasal cannula, denies SOB at rest, endorses some SOB with exertion. Serosanguinous drainage found on interdry and mepilex on left calf, both cleaned with Vashe and dressings changed. Mepilex on coccyx was soiled, cleaned, and replaced. Pt continues to progress. No acute concerns at this time.

## 2025-03-26 NOTE — PROGRESS NOTES
Pt A&Ox4, VSS, denies SOB or discomfort. Interdry fell out of lower LLE fissue, wound was cleaned and interdry replaced. No notable drainage on upper left lower LLE mepilex. Pt had a spell of feeling dizzy for ~1 min while IV abx was running, but pt said it went away pretty quickly. Plan to transition to PO abx tomorrow morning (3/27/25), PIV removed, okay per provider. No acute concerns at this time. Handoff given to KINRDA Samuel.

## 2025-03-26 NOTE — PROGRESS NOTES
Home Hospital Care Note    KINDRA Grossman contacted triage for handoff report stating taty Kellogg is doing well.Pt has meds this evening at 2000, no concerns at this time.     Lizzie Santos RN on 3/26/2025 at 4:59 PM

## 2025-03-26 NOTE — PLAN OF CARE
Goal Outcome Evaluation:      Plan of Care Reviewed With: patient    Overall Patient Progress: improvingOverall Patient Progress: improving    Patient denies pain. Medication administered- IV flushes easily, no difficulties with IV Abx administration. Medications prepared for HS and AM. Wound care provided per orders. Education provided. Left schedule for tomorrow on whiteboard. Patient denies further need or concerns. Had mild dizzy spell- quickly resolved with providing an apple and some grapes- patient realize she had not eaten much. Instructed patient to transfer very carefully and keep her phone in her pocket in case of fall- specified to triage in handoff.. Left in stable condition in home. Handoff to triage. Written report provided to AM nurse as well.Left patient in stable condition in home.

## 2025-03-26 NOTE — PROGRESS NOTES
Home Hospital Care at Home    Instacart order placed.  Ordered through [facility].     Type of Diet (Kardex or Orders review): Cardiac/Low Sodium    Delivery date and time: 3/26 by 1:12pm    Care team notified via chat.       Cyndi Bradley RN on 3/26/2025 at 10:42 AM

## 2025-03-26 NOTE — PROGRESS NOTES
[facility] in Home    Date of Service: 03/26/2025    Background:  Bess Enamorado is a 49 year old female who was admitted on 3/22/2025. Pt is enrolled in [facility] program     Interval History: Bess Enamorado is a 49 year old female with a past medical history of multiple sclerosis, HTN, anxiety, depression, cellulitis, CHF, who presented to ED with dysuria. ever/hematuria, baseline O2 3L NC with SOB, mobility issues The patient reports she may have a urinary tract infection given her urine was cloudy. She is also concerned for sepsis because the wound on the left lower extremity is draining and she is having a fever.     She was just discharged home from TCU as of recent. The patient notes she is normally able to ambulate, but she has been having increase difficulty in getting around.     She has this chronic lymphedema in the legs.  Empirically  given rocephin but will broaden to IV Zosyn, due to previously seen Enterococcus to cover broad-spectrum coverage for gram-positive negatives and anaerobes while  cultures are pending    Today, pt denies SOB, CP,; Slept well at home and feels she is doing better. Weighed herself on home scale this morning and up 4 lbs but first weight at home yesterday could have been inaccurate. Weight down 1 lb from ER weight and now using home scale. Denies any new resp sx or edema.  RN present during visit.   Chronic leg wounds; Has h/o struggles with constipation but had a large BM yesterday and a small one today. Has a chronic wound on the back of her left leg that was draining when she was at home. Denies dysuria, fever, or back pain.     Exam:   Vitals: Blood Pressure   101/69 Heart Rate 77 Pulse Ox  90-91% on chronic O2 Temperature 98.2     3/24 weight 268 lbs in ER  3/25 weight at home a.m. 263.4 lbs  3/26 weight 267.8 lbs    GENERAL: alert, no distress, and obese  EYES: Eyes grossly normal to inspection.  No discharge or erythema, or obvious scleral/conjunctival  abnormalities.  RESP: No audible wheeze, cough, or visible cyanosis.    SKIN: Visible skin clear. No significant rash, abnormal pigmentation or lesions.  SKIN: tattoo - lower legs,   Wound location: LLE 3/24        3/24     Last photo: 3/24  Wound due to: Friction  Wound history/plan of care: patient atated the wound to the back of her thigh was from a pinch injury from readjusting in her new wheelchair. Patient stated the wound to the back of the knee is from strong cleansing during a bed bath.   Wound base: Thigh 100 % Dermis, posterior knee 100 % Epidermis     Palpation of the wound bed: normal      Drainage: small     Description of drainage: bloody     Measurements (length x width x depth, in cm): Posterior thigh 1  x 3.1  x  0.1 cm ; knee fold 1 x 6 x 0cm     Tunneling: N/A     Undermining: N/A  Periwound skin: Erythema- blanchable and Superficial erosion      Color: pink and red      Temperature: warm    Wound location: Gluteal cleft    3/24     Last photo: 3/24  Wound due to: Friction  Wound history/plan of care: patient stated she felt it rip going to the bathroom   Wound base: 100 % Epidermis     Palpation of the wound bed: normal      Drainage: small     Description of drainage: bloody     Measurements (length x width x depth, in cm): 0.5  x 2.3  x  0.1 cm      Tunneling: N/A     Undermining: N/A  Periwound skin: Hemosiderin staining      Color: normal and consistent with surrounding tissue and dusky      Temperature: normal     3/25       Heel/foot eschar--was a blister and has turned to eschar over past 2 weeks         3/26 skin assessment with RN  Leg wound with serosanguenous slight drainage c/w yesterday exam; no odor per RN no redness; gluteal friction shear stable   NEURO Mentation and speech appropriate for age.  PSYCH: Appropriate affect, tone, and pace of words    Data:    Recent Labs   Lab 03/25/25  0937 03/24/25  0706 03/24/25  0705 03/23/25  0713   WBC 5.4 7.4  --  12.0*   HGB 14.9 13.4  --   14.6   MCV 90 91  --  92    151  --  134*   INR  --   --   --  1.30*     --  138 138   POTASSIUM 4.3  --  3.9  3.8 4.0   CHLORIDE 99  --  102 101   CO2 29  --  28 26   BUN 24.9*  --  22.1* 22.9*   CR 0.97*  --  1.01* 1.07*   ANIONGAP 9  --  8 11   EVITA 10.2  --  9.4 9.6   GLC 71  --  91 103*   ALBUMIN  --   --  3.1* 3.5   PROTTOTAL  --   --  6.9 7.4   BILITOTAL  --   --  2.8* 3.1*   ALKPHOS  --   --  72 75   ALT  --   --  9 8   AST  --   --  18 21       3/24 .9  3/25 .10    Imaging:  Results for orders placed or performed during the hospital encounter of 03/22/25   CT Abdomen Pelvis w Contrast    Narrative    EXAM: CT ABDOMEN PELVIS W CONTRAST  LOCATION: [facility]  DATE: 3/22/2025    INDICATION: Abdominal pain, bloating, history of small bowel obstruction  COMPARISON: 7/18/2024  TECHNIQUE: CT scan of the abdomen and pelvis was performed following injection of IV contrast. Multiplanar reformats were obtained. Dose reduction techniques were used.  CONTRAST: 90ml isovue 370    FINDINGS:   LOWER CHEST: Unremarkable.    HEPATOBILIARY: Mildly lobulated hepatic surface contour. Status post cholecystectomy. No biliary dilatation.    PANCREAS: Normal.    SPLEEN: Normal.    ADRENAL GLANDS: Normal.    KIDNEYS/BLADDER: Few nonobstructing intrarenal calculi bilaterally measuring up to 0.3 cm. No hydronephrosis. Urinary bladder appears normal.    BOWEL: Moderate amount of stool throughout the colon. No obstruction or inflammatory change. Normal appendix. No evidence of acute appendicitis.    LYMPH NODES: Unchanged prominent but nonenlarged retroperitoneal lymph nodes. No new lymphadenopathy identified.    VASCULATURE: Mild atherosclerotic calcifications.    PELVIC ORGANS: Redemonstrated right ovarian dermoid cyst measuring 9.0 x 7.9 cm. Uterus and left adnexa appear unremarkable.    MUSCULOSKELETAL: Multilevel degenerative changes of the spine. Grade 1 anterolisthesis of L4 on  L5.      Impression    IMPRESSION:   1.  No acute findings or inflammatory changes in the abdomen or pelvis.    2.  Mildly lobulated hepatic surface contour, suspicious for chronic hepatocellular disease.   XR Chest 1 View    Narrative    EXAM: XR CHEST 1 VIEW  LOCATION: [facility]  DATE: 3/23/2025    INDICATION: Dyspnea  COMPARISON: CT chest 2/11/2025. Chest radiographs 7/22/2024.      Impression    IMPRESSION: Somewhat low lung volumes. Elevation of the right hemidiaphragm. Enlarged heart. Prominent pulmonary vasculature suggestive of congestion. No focal lung consolidation, pleural effusion or pneumothorax.   US Lower Extremity Venous Duplex Bilateral    Narrative    EXAM: ULTRASOUND VENOUS BILATERAL LOWER EXTREMITIES WITH DOPPLER  LOCATION: [facility]  DATE/TIME: 03/23/2025, 6:41 AM CDT    INDICATION: Swelling.  COMPARISON: None.    TECHNIQUE: Venous Duplex ultrasound of bilateral lower extremities with and without compression, augmentation and duplex. Color flow and spectral Doppler with waveform analysis performed.    FINDINGS: Exam includes the common femoral, femoral, popliteal veins as well as segmentally visualized deep calf veins and greater saphenous vein. This study is overall moderately limited due to large body habitus. Note that the evaluation of the calf   veins is prohibiting due to suboptimal visualization.    RIGHT: Normal compressibility of the common femoral, femoral, popliteal and great saphenous veins. Unremarkable Doppler waveform evaluation of the common femoral, femoral and popliteal veins.    LEFT: A short segment of a vein in the deep soft tissues of the mid left thigh appears noncompressible, suggesting occlusive thrombus. Normal compressibility of the common femoral, femoral and popliteal veins. Unremarkable Doppler waveform evaluation of   the common femoral, femoral and popliteal veins. 2.3 x 1.5 x 1.0 cm avascular hypoechoic  structure in the deep soft tissues of the mid left thigh. This is nonspecific, could represent a small lymph node or cyst.      Impression    IMPRESSION:   1.  A short segment of a vein in the deep soft tissues of the mid left thigh appears noncompressible. This likely corresponds to a segment of age-indeterminate occlusive thrombus within the left greater saphenous vein.  2.  Note that this study is overall moderately limited due to large body habitus. The calf veins were not able to be adequately evaluated. Otherwise, no convincing evidence of thrombus in the deep veins of the visualized portions of bilateral lower   extremities.         *Note: Due to a large number of results and/or encounters for the requested time period, some results have not been displayed. A complete set of results can be found in Results Review.       ASSESSMENT/PLAN:  Principal Problem:    Acute on chronic respiratory failure with hypoxemia (H)  Active Problems:    Leg edema    Class 3 severe obesity due to excess calories with serious comorbidity and body mass index (BMI) of 50.0 to 59.9 in adult (H)    Acute on chronic diastolic congestive heart failure (H)    Recurrent UTI's    Obesity hypoventilation syndrome (H)    Pulmonary hypertension (H)    Shortness of breath    Acute pulmonary edema (H)    Cystitis    E. coli sepsis (H)    Deep vein thrombosis (DVT) of left lower extremity (H)      Cystitis with severe sepsis possibly E. coli sepsis (H)(in the past has grown E. coli in the urine as well as Enterococcus bacteremia)  Comment: Microbiological studies-  Blood cultures negative x 2   - IV antibiotics- empirically  given rocephin in ER but will broaden to IV Zosyn, due to previously seen Enterococcus to cover broad-spectrum coverage for gram-positive negatives and E coli UTI is sensitive to ceftriaxone,   Plan: reviewed with MD and will change to oral ABX tomorrow   Cont ABX --change to nitrofurantoin 100 mg po BID x 5  days  Discontinue IV ABX  Watch labs and recheck Friday--plan to discontinue to home care this weekend        Acute on chronic respiratory failure with hypoxemia (H)  Class 3 severe obesity due to excess calories with serious comorbidity and body mass index (BMI) of 50.0 to 59.9 in adult (H) Obesity hypoventilation syndrome (H)  Pulm HTN, with chronic hypoxia  Shortness of breath  Acute pulmonary edema (H)  Comment: multifactorial acute resp failure , baseline 3 L NC   Likely due to sepsis, obesity hypoventilation, new DVT and Acute diastolic CHF as evidenced by cxr luid overload.    Plan: tx with the diuretics and antibiotic  BMP improved  Follows with pulm here,    Out pt, PT eval and tx for lymphedema --has lymphedema wraps and elevated legs at home and sleeps in a recliner      Acute on chronic diastolic congestive heart failure (H)  Type 2 MI/Demand Ischemia due to Heart failure /sepsis  Comment: resolved dyspnea, weight down, x-ray suggest congestive heart failure likely consistent with the possible acute heart failure  -  echocardiogram with recent normal EF  Mid range  with SGLT2 I- Jardiance/Fraxiga  Plan: continue oral bumex and monitor creat and K &Mag with it and will adjust accordingly.  Monitor inputs outputs and daily weights.  Sodium and fluid restriction- 2g Na and *1500 c fluid restriction with adjustment as needed for cardiorenal balance.       New DVT  Comment: on eliquis  H/o PE in remote past and was on xarelto; may need longer course of Eliquis--Plan: educated pt on this and will d/w MD length of DOAC       LE Wounds  Buttock Wound  Comment: chronic and was getting home care for wounds prior to admission   Plan: 1) L posterior thigh wound & gluteal cleft:no odor per RN  Plann: change drsg  Every 3 days   Cleanse the area with NS and pat dry.  Apply No sting film barrier to periwound skin.  Cover wound with Mepilex 4x4       2) Daily: R knee fold (can use for any other fold moisture  issue)     1.  Wash skin gently with vashe. Pat, do not rub.  2.  With clean scissors, cut enough fabric to cover the affected area, allowing for a minimum of 2 inches to extend beyond the skin fold for moisture evaporation.  3.  Lay a single layer of fabric in the skin fold, placing one edge into the base of the fold. Gently smooth the rest of the fabric over the skin, keeping it flat.  4.  Leave at least 2 inches of fabric exposed outside the skin fold.  5.  Secure the fabric in one of several ways: with the skin fold, with a small amount of skin-friendly tape, or tucked under clothing.  6.  Remove the fabric before bathing and reuse it when finished. When removing, gently separate the skin fold and lift away.     Helpful Hints     1. InterDry  can be written on with a ballpoint pen. It may be helpful to label each piece of InterDry  with the date you started using it.  2.  Each piece of InterDry  may be used up to 5 days, depending on fabric soiling, odor, amount of moisture and general skin condition. Replace InterDry  if it becomes soiled with blood, urine or stool.  3.  Do not use creams, ointments, or powders with InterDry  as it may interfere with product efficacy.        Mood d/o  Financial/Environmental Concerns:      Comment: lives alone; has a friend  Plan: may need SW consult to plug into resources     - Lexapro, will continue         IV/Lines: peripheral IV  DVT Prophylaxis: DOAC  Code Status: Full Code  Disposition: Anticipated in 2-4 Days    Telehealth Visit Details  Patient/Surrogate participated in the telehealth encounter via audiovisual communication. Identity of patient was verified. Verbal consent was provided for the visit by patient/surrogate.   Video Start Time: 0830  Video End Time: 0900  Originating Location (pt. location): Home  Distant Location (provider location): telehealth    TOTAL TIME 90 MINUTES FOR VISIT, INCLUDING REVIEWING CHART, LABS PENDING TODAY, ordering new meds and labs,  REVIEWING IMAGING, REVIEWING PLAN WITH PT AND RN, REVIEWING CASE WITH MD Sara Wong CNP on 3/26/2025 at 9:38 AM        Sara Wong CNP  [facility] Home  [phone]

## 2025-03-27 VITALS
BODY MASS INDEX: 45.72 KG/M2 | HEART RATE: 84 BPM | RESPIRATION RATE: 20 BRPM | HEIGHT: 64 IN | WEIGHT: 267.8 LBS | DIASTOLIC BLOOD PRESSURE: 75 MMHG | TEMPERATURE: 98.2 F | SYSTOLIC BLOOD PRESSURE: 112 MMHG | OXYGEN SATURATION: 99 %

## 2025-03-27 LAB
ANION GAP SERPL CALCULATED.3IONS-SCNC: 8 MMOL/L (ref 7–15)
ATRIAL RATE - MUSE: 107 BPM
BACTERIA BLD CULT: NORMAL
BACTERIA BLD CULT: NORMAL
BASOPHILS # BLD AUTO: 0 10E3/UL (ref 0–0.2)
BASOPHILS NFR BLD AUTO: 1 %
BUN SERPL-MCNC: 20.6 MG/DL (ref 6–20)
CALCIUM SERPL-MCNC: 9.9 MG/DL (ref 8.8–10.4)
CHLORIDE SERPL-SCNC: 100 MMOL/L (ref 98–107)
CREAT SERPL-MCNC: 0.83 MG/DL (ref 0.51–0.95)
CRP SERPL-MCNC: 31 MG/L
DIASTOLIC BLOOD PRESSURE - MUSE: 65 MMHG
EGFRCR SERPLBLD CKD-EPI 2021: 85 ML/MIN/1.73M2
EOSINOPHIL # BLD AUTO: 0.2 10E3/UL (ref 0–0.7)
EOSINOPHIL NFR BLD AUTO: 5 %
ERYTHROCYTE [DISTWIDTH] IN BLOOD BY AUTOMATED COUNT: 16.8 % (ref 10–15)
GLUCOSE SERPL-MCNC: 74 MG/DL (ref 70–99)
HCO3 SERPL-SCNC: 29 MMOL/L (ref 22–29)
HCT VFR BLD AUTO: 43.2 % (ref 35–47)
HGB BLD-MCNC: 14.3 G/DL (ref 11.7–15.7)
IMM GRANULOCYTES # BLD: 0 10E3/UL
IMM GRANULOCYTES NFR BLD: 0 %
INTERPRETATION ECG - MUSE: NORMAL
LYMPHOCYTES # BLD AUTO: 1 10E3/UL (ref 0.8–5.3)
LYMPHOCYTES NFR BLD AUTO: 25 %
MCH RBC QN AUTO: 29.2 PG (ref 26.5–33)
MCHC RBC AUTO-ENTMCNC: 33.1 G/DL (ref 31.5–36.5)
MCV RBC AUTO: 88 FL (ref 78–100)
MONOCYTES # BLD AUTO: 0.4 10E3/UL (ref 0–1.3)
MONOCYTES NFR BLD AUTO: 10 %
NEUTROPHILS # BLD AUTO: 2.4 10E3/UL (ref 1.6–8.3)
NEUTROPHILS NFR BLD AUTO: 60 %
NRBC # BLD AUTO: 0 10E3/UL
NRBC BLD AUTO-RTO: 0 /100
NT-PROBNP SERPL-MCNC: 3718 PG/ML (ref 0–900)
P AXIS - MUSE: 81 DEGREES
PLATELET # BLD AUTO: 173 10E3/UL (ref 150–450)
POTASSIUM SERPL-SCNC: 3.7 MMOL/L (ref 3.4–5.3)
PR INTERVAL - MUSE: 196 MS
QRS DURATION - MUSE: 104 MS
QT - MUSE: 356 MS
QTC - MUSE: 475 MS
R AXIS - MUSE: -23 DEGREES
RBC # BLD AUTO: 4.89 10E6/UL (ref 3.8–5.2)
SODIUM SERPL-SCNC: 137 MMOL/L (ref 135–145)
SYSTOLIC BLOOD PRESSURE - MUSE: 142 MMHG
T AXIS - MUSE: 123 DEGREES
VENTRICULAR RATE- MUSE: 107 BPM
WBC # BLD AUTO: 4 10E3/UL (ref 4–11)

## 2025-03-27 PROCEDURE — 85018 HEMOGLOBIN: CPT | Performed by: REGISTERED NURSE

## 2025-03-27 PROCEDURE — 85004 AUTOMATED DIFF WBC COUNT: CPT | Performed by: REGISTERED NURSE

## 2025-03-27 PROCEDURE — 83880 ASSAY OF NATRIURETIC PEPTIDE: CPT | Performed by: REGISTERED NURSE

## 2025-03-27 PROCEDURE — 120N000001 HC R&B MED SURG/OB

## 2025-03-27 PROCEDURE — 80048 BASIC METABOLIC PNL TOTAL CA: CPT | Performed by: REGISTERED NURSE

## 2025-03-27 PROCEDURE — 99232 SBSQ HOSP IP/OBS MODERATE 35: CPT | Performed by: REGISTERED NURSE

## 2025-03-27 PROCEDURE — 86140 C-REACTIVE PROTEIN: CPT | Performed by: REGISTERED NURSE

## 2025-03-27 PROCEDURE — 250N000013 HC RX MED GY IP 250 OP 250 PS 637: Performed by: PHYSICIAN ASSISTANT

## 2025-03-27 PROCEDURE — 250N000013 HC RX MED GY IP 250 OP 250 PS 637: Performed by: REGISTERED NURSE

## 2025-03-27 RX ADMIN — APIXABAN 5 MG: 5 TABLET, FILM COATED ORAL at 08:37

## 2025-03-27 RX ADMIN — POTASSIUM CHLORIDE 30 MEQ: 1500 TABLET, EXTENDED RELEASE ORAL at 08:38

## 2025-03-27 RX ADMIN — BUMETANIDE 6 MG: 2 TABLET ORAL at 08:37

## 2025-03-27 RX ADMIN — APIXABAN 5 MG: 5 TABLET, FILM COATED ORAL at 19:42

## 2025-03-27 RX ADMIN — ESCITALOPRAM 10 MG: 5 TABLET, FILM COATED ORAL at 08:38

## 2025-03-27 RX ADMIN — SIMVASTATIN 10 MG: 10 TABLET, FILM COATED ORAL at 19:42

## 2025-03-27 RX ADMIN — EMPAGLIFLOZIN 10 MG: 10 TABLET, FILM COATED ORAL at 08:37

## 2025-03-27 RX ADMIN — NITROFURANTOIN MONOHYDRATE/MACROCRYSTALLINE 100 MG: 25; 75 CAPSULE ORAL at 08:36

## 2025-03-27 RX ADMIN — NITROFURANTOIN MONOHYDRATE/MACROCRYSTALLINE 100 MG: 25; 75 CAPSULE ORAL at 19:41

## 2025-03-27 RX ADMIN — Medication 12.5 MG: at 08:39

## 2025-03-27 ASSESSMENT — ACTIVITIES OF DAILY LIVING (ADL)
ADLS_ACUITY_SCORE: 56

## 2025-03-27 NOTE — PROGRESS NOTES
Home Hospital Care Note    Plan for pt is to discharge tomorrow pending labs. Called [facility] regarding SOC for HC. Spoke with Kemar in intake. He states they could do SOC 24-48 hrs after discharge from home hospital program. He is requesting discharge summary and HC orders to be faxed to them at [phone].   Updated TEAM and provider.   [facility] will fax information over when available.    Cheli Rizo RN on 3/27/2025 at 10:05 AM

## 2025-03-27 NOTE — PROGRESS NOTES
[facility]    Date of Service: 03/27/2025    Background:  Bess Enamorado is a 49 year old female who was admitted on 3/22/2025. Pt is enrolled in [facility] program     Interval History: Bess Enamorado is a 49 year old female with a past medical history of multiple sclerosis, HTN, anxiety, depression, cellulitis, CHF, who presented to ED with dysuria. ever/hematuria, baseline O2 3L NC with SOB, mobility issues The patient reports she may have a urinary tract infection given her urine was cloudy. She is also concerned for sepsis because the wound on the left lower extremity is draining and she is having a fever.     She was just discharged home from TCU as of recent. The patient notes she is normally able to ambulate, but she has been having increase difficulty in getting around.     She has this chronic lymphedema in the legs.  Empirically  given rocephin but will broaden to IV Zosyn, due to previously seen Enterococcus to cover broad-spectrum coverage for gram-positive negatives and anaerobes while  cultures are pending    Today, pt denies SOB, CP,; Slept well at home and feels she is doing better. Denies any new resp sx or edema.  RN present during visit.   Chronic leg wounds; Has h/o struggles with constipation but having daily Bms.. Has a chronic wound on the back of her left leg that was draining when she was at home, improving per RN and pt. Denies dysuria, fever, or back pain. Denies SOB, CP    Exam:   112/75 BP  84 HR  99 % chronic O2 NC  98.2 temp  8:12:17 AM, Manual  264.2 lbs      3/24 weight 268 lbs in ER  3/25 weight at home a.m. 263.4 lbs  3/26 weight 267.8 lbs  3/27 weight 264.2 lbs    GENERAL: pleasant, alert, no distress, and obese  EYES: Eyes grossly normal to inspection.  No discharge or erythema, or obvious scleral/conjunctival abnormalities.  RESP: No audible wheeze, cough, or visible cyanosis.    SKIN: Visible skin clear. No significant rash, abnormal pigmentation or  lesions.  SKIN: tattoo - lower legs,   Wound location: LLE 3/24        3/24     Last photo: 3/24  Wound due to: Friction  Wound history/plan of care: patient atated the wound to the back of her thigh was from a pinch injury from readjusting in her new wheelchair. Patient stated the wound to the back of the knee is from strong cleansing during a bed bath.   Wound base: Thigh 100 % Dermis, posterior knee 100 % Epidermis     Palpation of the wound bed: normal      Drainage: small     Description of drainage: bloody     Measurements (length x width x depth, in cm): Posterior thigh 1  x 3.1  x  0.1 cm ; knee fold 1 x 6 x 0cm     Tunneling: N/A     Undermining: N/A  Periwound skin: Erythema- blanchable and Superficial erosion      Color: pink and red      Temperature: warm    Wound location: Gluteal cleft    3/24     Last photo: 3/24  Wound due to: Friction  Wound history/plan of care: patient stated she felt it rip going to the bathroom   Wound base: 100 % Epidermis     Palpation of the wound bed: normal      Drainage: small     Description of drainage: bloody     Measurements (length x width x depth, in cm): 0.5  x 2.3  x  0.1 cm      Tunneling: N/A     Undermining: N/A  Periwound skin: Hemosiderin staining      Color: normal and consistent with surrounding tissue and dusky      Temperature: normal     3/25       Heel/foot eschar--was a blister and has turned to eschar over past 2 weeks         3/26 skin assessment with RN  Leg wound with serosanguenous slight drainage c/w yesterday exam; no odor per RN no redness; gluteal friction shear stable   3/27 see nursing note-improving     NEURO Mentation and speech appropriate for age.  PSYCH: Appropriate affect, tone, and pace of words    Data:    Recent Labs   Lab 03/27/25  0859 03/25/25  0937 03/24/25  0706 03/24/25  0705 03/23/25  0713   WBC 4.0 5.4 7.4  --  12.0*   HGB 14.3 14.9 13.4  --  14.6   MCV 88 90 91  --  92    165 151  --  134*   INR  --   --   --   --   1.30*    137  --  138 138   POTASSIUM 3.7 4.3  --  3.9  3.8 4.0   CHLORIDE 100 99  --  102 101   CO2 29 29  --  28 26   BUN 20.6* 24.9*  --  22.1* 22.9*   CR 0.83 0.97*  --  1.01* 1.07*   ANIONGAP 8 9  --  8 11   EVITA 9.9 10.2  --  9.4 9.6   GLC 74 71  --  91 103*   ALBUMIN  --   --   --  3.1* 3.5   PROTTOTAL  --   --   --  6.9 7.4   BILITOTAL  --   --   --  2.8* 3.1*   ALKPHOS  --   --   --  72 75   ALT  --   --   --  9 8   AST  --   --   --  18 21       3/24 .9  3/25 .10  3/27 CRP 31    2/11 BNP 7,880  3/22 BNP 5,125  3/27 BNP 3,718    Imaging:  Results for orders placed or performed during the hospital encounter of 03/22/25   CT Abdomen Pelvis w Contrast    Narrative    EXAM: CT ABDOMEN PELVIS W CONTRAST  LOCATION: [facility]  DATE: 3/22/2025    INDICATION: Abdominal pain, bloating, history of small bowel obstruction  COMPARISON: 7/18/2024  TECHNIQUE: CT scan of the abdomen and pelvis was performed following injection of IV contrast. Multiplanar reformats were obtained. Dose reduction techniques were used.  CONTRAST: 90ml isovue 370    FINDINGS:   LOWER CHEST: Unremarkable.    HEPATOBILIARY: Mildly lobulated hepatic surface contour. Status post cholecystectomy. No biliary dilatation.    PANCREAS: Normal.    SPLEEN: Normal.    ADRENAL GLANDS: Normal.    KIDNEYS/BLADDER: Few nonobstructing intrarenal calculi bilaterally measuring up to 0.3 cm. No hydronephrosis. Urinary bladder appears normal.    BOWEL: Moderate amount of stool throughout the colon. No obstruction or inflammatory change. Normal appendix. No evidence of acute appendicitis.    LYMPH NODES: Unchanged prominent but nonenlarged retroperitoneal lymph nodes. No new lymphadenopathy identified.    VASCULATURE: Mild atherosclerotic calcifications.    PELVIC ORGANS: Redemonstrated right ovarian dermoid cyst measuring 9.0 x 7.9 cm. Uterus and left adnexa appear unremarkable.    MUSCULOSKELETAL: Multilevel  degenerative changes of the spine. Grade 1 anterolisthesis of L4 on L5.      Impression    IMPRESSION:   1.  No acute findings or inflammatory changes in the abdomen or pelvis.    2.  Mildly lobulated hepatic surface contour, suspicious for chronic hepatocellular disease.   XR Chest 1 View    Narrative    EXAM: XR CHEST 1 VIEW  LOCATION: [facility]  DATE: 3/23/2025    INDICATION: Dyspnea  COMPARISON: CT chest 2/11/2025. Chest radiographs 7/22/2024.      Impression    IMPRESSION: Somewhat low lung volumes. Elevation of the right hemidiaphragm. Enlarged heart. Prominent pulmonary vasculature suggestive of congestion. No focal lung consolidation, pleural effusion or pneumothorax.   US Lower Extremity Venous Duplex Bilateral    Narrative    EXAM: ULTRASOUND VENOUS BILATERAL LOWER EXTREMITIES WITH DOPPLER  LOCATION: [facility]  DATE/TIME: 03/23/2025, 6:41 AM CDT    INDICATION: Swelling.  COMPARISON: None.    TECHNIQUE: Venous Duplex ultrasound of bilateral lower extremities with and without compression, augmentation and duplex. Color flow and spectral Doppler with waveform analysis performed.    FINDINGS: Exam includes the common femoral, femoral, popliteal veins as well as segmentally visualized deep calf veins and greater saphenous vein. This study is overall moderately limited due to large body habitus. Note that the evaluation of the calf   veins is prohibiting due to suboptimal visualization.    RIGHT: Normal compressibility of the common femoral, femoral, popliteal and great saphenous veins. Unremarkable Doppler waveform evaluation of the common femoral, femoral and popliteal veins.    LEFT: A short segment of a vein in the deep soft tissues of the mid left thigh appears noncompressible, suggesting occlusive thrombus. Normal compressibility of the common femoral, femoral and popliteal veins. Unremarkable Doppler waveform evaluation of   the common femoral, femoral  and popliteal veins. 2.3 x 1.5 x 1.0 cm avascular hypoechoic structure in the deep soft tissues of the mid left thigh. This is nonspecific, could represent a small lymph node or cyst.      Impression    IMPRESSION:   1.  A short segment of a vein in the deep soft tissues of the mid left thigh appears noncompressible. This likely corresponds to a segment of age-indeterminate occlusive thrombus within the left greater saphenous vein.  2.  Note that this study is overall moderately limited due to large body habitus. The calf veins were not able to be adequately evaluated. Otherwise, no convincing evidence of thrombus in the deep veins of the visualized portions of bilateral lower   extremities.         *Note: Due to a large number of results and/or encounters for the requested time period, some results have not been displayed. A complete set of results can be found in Results Review.       ASSESSMENT/PLAN:  Principal Problem:    Acute on chronic respiratory failure with hypoxemia (H)  Active Problems:    Leg edema    Class 3 severe obesity due to excess calories with serious comorbidity and body mass index (BMI) of 50.0 to 59.9 in adult (H)    Acute on chronic diastolic congestive heart failure (H)    Recurrent UTI's    Obesity hypoventilation syndrome (H)    Pulmonary hypertension (H)    Shortness of breath    Acute pulmonary edema (H)    Cystitis    E. coli sepsis (H)    Deep vein thrombosis (DVT) of left lower extremity (H)      Cystitis with severe sepsis possibly E. coli sepsis (H)(in the past has grown E. coli in the urine as well as Enterococcus bacteremia)  Comment: Microbiological studies-  Blood cultures negative x 2   - IV antibiotics- empirically  given rocephin in ER but will broaden to IV Zosyn, due to previously seen Enterococcus to cover broad-spectrum coverage for gram-positive negatives and E coli UTI is sensitive to ceftriaxone, transitioned to oral ABX  Plan: continue nitrofurantoin 100 mg po BID x  5 days  CRP improved; plan to discontinue to home care this weekend; homebound, so needs meds couriered out; RN working on discontinue plan for tomorrow        Acute on chronic respiratory failure with hypoxemia (H)  Class 3 severe obesity due to excess calories with serious comorbidity and body mass index (BMI) of 50.0 to 59.9 in adult (H) Obesity hypoventilation syndrome (H)  Pulm HTN, with chronic hypoxia  Shortness of breath  Acute pulmonary edema (H)  Comment: multifactorial acute resp failure , baseline 3 L NC 98%  Likely due to sepsis, obesity hypoventilation, new DVT and Acute diastolic CHF as evidenced by cxr luid overload.    Plan: tx with the diuretics and antibiotic  BMP improved  Follows with pulm here,    Out pt, PT eval and tx for lymphedema --has lymphedema wraps and elevated legs at home and sleeps in a recliner    Weight down as above and doing well clinically     Acute on chronic diastolic congestive heart failure (H)  Type 2 MI/Demand Ischemia due to Heart failure /sepsis  Comment: resolved dyspnea, weight down, x-ray suggest congestive heart failure likely consistent with the possible acute heart failure  -  echocardiogram with recent normal EF      Plan: continue oral bumex and monitor creat and K &Mag with it and will adjust accordingly. BMP stable today   Monitor inputs outputs and daily weights.  Sodium and fluid restriction- 2g Na and *1500 c fluid restriction with adjustment as needed for cardiorenal balance.       New DVT  Comment: on eliquis  H/o PE in remote past and was on xarelto; may need longer course of Eliquis--Plan: educated pt on this and will d/w MD length of DOAC       LE Wounds  Buttock Wound  Comment: chronic and was getting home care for wounds prior to admission   Plan: 1) L posterior thigh wound & gluteal cleft:no odor per RN  Plann: change drsg  Every 3 days   Cleanse the area with NS and pat dry.  Apply No sting film barrier to periwound skin.  Cover wound with  Mepilex 4x4       2) Daily: R knee fold (can use for any other fold moisture issue)     1.  Wash skin gently with vashe. Pat, do not rub.  2.  With clean scissors, cut enough fabric to cover the affected area, allowing for a minimum of 2 inches to extend beyond the skin fold for moisture evaporation.  3.  Lay a single layer of fabric in the skin fold, placing one edge into the base of the fold. Gently smooth the rest of the fabric over the skin, keeping it flat.  4.  Leave at least 2 inches of fabric exposed outside the skin fold.  5.  Secure the fabric in one of several ways: with the skin fold, with a small amount of skin-friendly tape, or tucked under clothing.  6.  Remove the fabric before bathing and reuse it when finished. When removing, gently separate the skin fold and lift away.     Helpful Hints     1. InterDry  can be written on with a ballpoint pen. It may be helpful to label each piece of InterDry  with the date you started using it.  2.  Each piece of InterDry  may be used up to 5 days, depending on fabric soiling, odor, amount of moisture and general skin condition. Replace InterDry  if it becomes soiled with blood, urine or stool.  3.  Do not use creams, ointments, or powders with InterDry  as it may interfere with product efficacy.        Mood d/o  Financial/Environmental Concerns:      Comment: lives alone; has a friend  Plan: may need SW consult to plug into resources in future so she can stay at home     - Lexapro, will continue         IV/Lines: no  DVT Prophylaxis: DOAC  Code Status: Full Code  Disposition: 1 day    Telehealth Visit Details  Patient/Surrogate participated in the telehealth encounter via audiovisual communication. Identity of patient was verified. Verbal consent was provided for the visit by patient/surrogate.   Video Start Time: 1320  Video End Time: 1400  Originating Location (pt. location): Home  Distant Location (provider location): telehealth    TOTAL TIME 90 MINUTES FOR  VISIT, INCLUDING REVIEWING CHART, LABS PENDING TODAY, ordering new meds and labs, REVIEWING IMAGING, REVIEWING PLAN WITH PT AND RN, REVIEWING CASE WITH MD and RN and pharmacy     Sara Wong CNP on 3/27/2025 at 2:42 PM        Sara Wong CNP  [facility]  [phone]

## 2025-03-27 NOTE — PLAN OF CARE
Goal Outcome Evaluation:         Pt A&Ox4, VSS, denies pain. Pt remains on 3 lpm as that is her baseline, saturating 99%. Upper LLE fissure had significantly less drainage than yesterday and granulation is now visible. Lower LLE fissure still has serosanguineous drainage, wound bed cleaned, new interdry applied and secured in place. Wound on coccyx continues to improve and has begun granulation. No acute concerns at this time.

## 2025-03-27 NOTE — PLAN OF CARE
Goal Outcome Evaluation:       Pt has improved since this morning. Lower LLE fissure leaked through interdry and mepilex. Wound was cleaned and new interdry and mepilex were applied. No acute concerns at this time.

## 2025-03-27 NOTE — PROGRESS NOTES
Home Hospital Care Note    KINDRA Grossman contacted triage for handoff report stating pt is doing well. Plan is to discharge tomorrow morning. 2000 med pass and currently waiting for med delivery. No additional concerns at this time.     Lizzie Santos RN on 3/27/2025 at 4:04 PM

## 2025-03-27 NOTE — PROGRESS NOTES
Home Hospital Care:    Reason for Visit: Medication Administration    Video visit with patient for medication administration. Successful self-administration of medications: eliquis, simvastatin.   Most recent biometrics reviewed. Patient without acute concerns.  MAR updated.     Lizzie Santos RN on 3/26/2025 at 8:11 PM

## 2025-03-27 NOTE — PROGRESS NOTES
[facility]ospital [facility] Home Biometric Monitoring Alert    Biometric monitoring alert for: Low Oxygen      Most Recent Vitals  Pulse Ox: 89    Vitals Recheck  Oxygen: 99    Symptoms  NO SYMPTOMS    Assessment  Patient activity level prior to/at time of alert: pt moving around    Additional Assessment      Action taken  Patient and/or caregiver instructed to call triage with any new or worsening symptoms [phone].      Lizzie Santos RN on 3/26/2025 at 8:12 PM

## 2025-03-28 VITALS
OXYGEN SATURATION: 99 % | BODY MASS INDEX: 44.73 KG/M2 | TEMPERATURE: 98 F | HEIGHT: 64 IN | RESPIRATION RATE: 20 BRPM | WEIGHT: 262 LBS | SYSTOLIC BLOOD PRESSURE: 113 MMHG | DIASTOLIC BLOOD PRESSURE: 82 MMHG | HEART RATE: 76 BPM

## 2025-03-28 PROBLEM — J96.21 ACUTE ON CHRONIC RESPIRATORY FAILURE WITH HYPOXEMIA (H): Status: RESOLVED | Noted: 2024-03-16 | Resolved: 2025-03-28

## 2025-03-28 PROBLEM — E66.2 OBESITY HYPOVENTILATION SYNDROME (H): Status: RESOLVED | Noted: 2023-12-29 | Resolved: 2025-03-28

## 2025-03-28 PROBLEM — J81.0 ACUTE PULMONARY EDEMA (H): Status: RESOLVED | Noted: 2025-03-23 | Resolved: 2025-03-28

## 2025-03-28 PROBLEM — R06.02 SHORTNESS OF BREATH: Status: RESOLVED | Noted: 2025-03-23 | Resolved: 2025-03-28

## 2025-03-28 PROBLEM — A41.51 E. COLI SEPSIS (H): Status: RESOLVED | Noted: 2025-03-23 | Resolved: 2025-03-28

## 2025-03-28 PROBLEM — N30.90 CYSTITIS: Status: RESOLVED | Noted: 2025-03-23 | Resolved: 2025-03-28

## 2025-03-28 LAB
BACTERIA BLD CULT: NO GROWTH
BACTERIA BLD CULT: NO GROWTH

## 2025-03-28 PROCEDURE — 250N000013 HC RX MED GY IP 250 OP 250 PS 637: Performed by: REGISTERED NURSE

## 2025-03-28 PROCEDURE — 99239 HOSP IP/OBS DSCHRG MGMT >30: CPT | Performed by: REGISTERED NURSE

## 2025-03-28 PROCEDURE — 250N000013 HC RX MED GY IP 250 OP 250 PS 637: Performed by: PHYSICIAN ASSISTANT

## 2025-03-28 RX ORDER — POTASSIUM CHLORIDE 1125 MG/1
30 TABLET, EXTENDED RELEASE ORAL DAILY
Qty: 30 TABLET | Refills: 0 | Status: SHIPPED | OUTPATIENT
Start: 2025-03-29 | End: 2025-04-01

## 2025-03-28 RX ORDER — SPIRONOLACTONE 25 MG/1
12.5 TABLET ORAL DAILY
Qty: 30 TABLET | Refills: 0 | Status: SHIPPED | OUTPATIENT
Start: 2025-03-29 | End: 2025-04-02

## 2025-03-28 RX ORDER — ACETAMINOPHEN 325 MG/1
650 TABLET ORAL EVERY 4 HOURS PRN
Qty: 30 TABLET | Refills: 0 | Status: SHIPPED | OUTPATIENT
Start: 2025-03-28 | End: 2025-04-02

## 2025-03-28 RX ORDER — BUMETANIDE 2 MG/1
6 TABLET ORAL DAILY
Qty: 30 TABLET | Refills: 0 | Status: SHIPPED | OUTPATIENT
Start: 2025-03-29 | End: 2025-04-02

## 2025-03-28 RX ORDER — SENNOSIDES 8.6 MG
1 TABLET ORAL 2 TIMES DAILY PRN
Qty: 20 TABLET | Refills: 0 | Status: ON HOLD | OUTPATIENT
Start: 2025-03-28

## 2025-03-28 RX ORDER — GUAIFENESIN 200 MG/10ML
200 LIQUID ORAL EVERY 4 HOURS PRN
Qty: 30 ML | Refills: 0 | Status: SHIPPED | OUTPATIENT
Start: 2025-03-28 | End: 2025-04-01

## 2025-03-28 RX ORDER — CALCIUM CARBONATE 500 MG/1
1 TABLET, CHEWABLE ORAL 4 TIMES DAILY PRN
Qty: 30 TABLET | Refills: 0 | Status: SHIPPED | OUTPATIENT
Start: 2025-03-28 | End: 2025-04-01

## 2025-03-28 RX ORDER — NITROFURANTOIN 25; 75 MG/1; MG/1
100 CAPSULE ORAL EVERY 12 HOURS
Qty: 6 CAPSULE | Refills: 0 | Status: SHIPPED | OUTPATIENT
Start: 2025-03-28 | End: 2025-03-31

## 2025-03-28 RX ADMIN — BUMETANIDE 6 MG: 2 TABLET ORAL at 08:54

## 2025-03-28 RX ADMIN — APIXABAN 5 MG: 5 TABLET, FILM COATED ORAL at 09:00

## 2025-03-28 RX ADMIN — Medication 12.5 MG: at 08:53

## 2025-03-28 RX ADMIN — ESCITALOPRAM 10 MG: 5 TABLET, FILM COATED ORAL at 08:58

## 2025-03-28 RX ADMIN — NITROFURANTOIN MONOHYDRATE/MACROCRYSTALLINE 100 MG: 25; 75 CAPSULE ORAL at 08:57

## 2025-03-28 RX ADMIN — EMPAGLIFLOZIN 10 MG: 10 TABLET, FILM COATED ORAL at 08:59

## 2025-03-28 RX ADMIN — POTASSIUM CHLORIDE 30 MEQ: 1500 TABLET, EXTENDED RELEASE ORAL at 08:56

## 2025-03-28 ASSESSMENT — ACTIVITIES OF DAILY LIVING (ADL)
ADLS_ACUITY_SCORE: 56

## 2025-03-28 NOTE — PROGRESS NOTES
Home Hospital Care Note    RN called [facility] for confirmation that patient will be able to have medications couriered to patient's home. Pharmacy confirmed that meds will be couriered ongoing. Pharmacy reported that they will call patient directly with more info regarding copays etc. RN updated provider and care team. Nursing will continue to monitor as necessary    Chantal Bain RN on 3/28/2025 at 9:08 AM

## 2025-03-28 NOTE — PROGRESS NOTES
Home Hospital Care:    Reason for Visit: Medication Administration    Video visit with patient for medication administration. Successful self-administration of medications.   Most recent biometrics reviewed. Patient without acute concerns.  MAR updated.     Chantal Bain RN on 3/28/2025 at 9:03 AM

## 2025-03-28 NOTE — PROGRESS NOTES
Home Hospital Care Discharge    Patient discharged from Home Hospital Care. [facility] notified to call and coordinate pickup of biometric equipment.      Delmis Ibarra RN on 3/28/2025 at 3:34 PM

## 2025-03-28 NOTE — PLAN OF CARE
Goal Outcome Evaluation:         Nursing Discharge Note:  Pt has met all goals of care. Medications were destroyed via deterra bag. Biometrics were packed up. AVS thoroughly discussed with pt, denies additional questions or concerns. RN contacted outpatient pharmacy to confirm afternoon delivery 3/28/25. Pt has been discharged from hospital at home program.

## 2025-03-28 NOTE — PROGRESS NOTES
Home Hospital Care:    Reason for Visit: Medication Administration    Video visit with patient for medication administration. Successful self-administration of medications: macrobid, simvastatin, eliquis.   Most recent biometrics reviewed. Patient without acute concerns.  MAR updated.     Lizzie Santos RN on 3/27/2025 at 7:40 PM

## 2025-03-28 NOTE — PROGRESS NOTES
Hospital [facility] Home Triage Note    Caller name: Bess Enamorado  Call back number: [phone]    Reason for Call: concerned about a text that patient received from pharmacy about med     Assessment: Patient called d/t receiving a text from pharmacy about meds being ready for . Patient is home bound and needs meds couriered    Intervention: RN explained to patient it may be an automatic text that is sent out by the pharmacy when the scripts are filled. RN reassured patient that nurse spoke with pharmacy and the meds were going to be delivered on going. RN also updated patient that the pharmacy will be calling patient directly with details and for any copays. Patient verbalized understanding    Action taken: No further actions necessary at this time    Chantal Bain RN on 3/28/2025 at 10:00 AM

## 2025-03-28 NOTE — DISCHARGE SUMMARY
HOSPITAL IN HOME DISCHARGE SUMMARY    [facility]     Admission Date: 3/22/2025  Discharge Date: 3/28/2025   PCP:  Mikala Luna  Code Status: Full Code  Discharge Disposition: Discharged to home.    Principal Diagnosis    Shortness of breath [R06.02]  Pyelonephritis, acute [N10]  Acute pulmonary edema (H) [J81.0]  Cystitis [N30.90]     Hospital Problem List   Active Problems:  Principal Problem:    Acute on chronic respiratory failure with hypoxemia (H)  Active Problems:    Leg edema    Class 3 severe obesity due to excess calories with serious comorbidity and body mass index (BMI) of 50.0 to 59.9 in adult (H)    Acute on chronic diastolic congestive heart failure (H)    Recurrent UTI's    Obesity hypoventilation syndrome (H)    Pulmonary hypertension (H)    Shortness of breath    Acute pulmonary edema (H)    Cystitis    E. coli sepsis (H)    Deep vein thrombosis (DVT) of left lower extremity (H)      Hospital In Home Course   ystitis with severe sepsis possibly E. coli sepsis (H)(in the past has grown E. coli in the urine as well as Enterococcus bacteremia)  Comment: Microbiological studies-  Blood cultures negative x 2   - IV antibiotics- empirically  given rocephin in ER but will broaden to IV Zosyn, due to previously seen Enterococcus to cover broad-spectrum coverage for gram-positive negatives and E coli UTI is sensitive to ceftriaxone, transitioned to oral ABX  Plan: continue nitrofurantoin 100 mg po BID x 5 days  CRP improved; plan to discontinue to home care this weekend; homebound, so needs meds couriered out; RN working on discontinue plan for tomorrow          Acute on chronic respiratory failure with hypoxemia (H)  Class 3 severe obesity due to excess calories with serious comorbidity and body mass index (BMI) of 50.0 to 59.9 in adult (H) Obesity hypoventilation syndrome (H)  Pulm HTN, with chronic hypoxia  Shortness of breath  Acute pulmonary edema (H)  Comment: multifactorial acute resp  failure , baseline 3 L NC 98%  Likely due to sepsis, obesity hypoventilation, new DVT and Acute diastolic CHF as evidenced by cxr luid overload.    Plan: tx with the diuretics and antibiotic  BMP improved  Follows with pulm here,    Out pt, PT eval and tx for lymphedema --has lymphedema wraps and elevated legs at home and sleeps in a recliner    Weight down as above and doing well clinically      Acute on chronic diastolic congestive heart failure (H)  Type 2 MI/Demand Ischemia due to Heart failure /sepsis  Comment: resolved dyspnea, weight down, x-ray suggest congestive heart failure likely consistent with the possible acute heart failure  -  echocardiogram with recent normal EF        Plan: continue oral bumex and monitor creat and K &Mag with it and will adjust accordingly. BMP stable today   Monitor inputs outputs and daily weights.  Sodium and fluid restriction- 2g Na and *1500 c fluid restriction with adjustment as needed for cardiorenal balance.       New DVT  Comment: on eliquis  H/o PE in remote past and was on xarelto; may need longer course of Eliquis--Plan: educated pt on this and will d/w MD length of DOAC        LE Wounds  Buttock Wound  Comment: chronic and was getting home care for wounds prior to admission   Plan: 1) L posterior thigh wound & gluteal cleft:no odor per RN  Plann: change drsg  Every 3 days   Cleanse the area with NS and pat dry.  Apply No sting film barrier to periwound skin.  Cover wound with Mepilex 4x4        2) Daily: R knee fold (can use for any other fold moisture issue)     1.  Wash skin gently with vashe. Pat, do not rub.  2.  With clean scissors, cut enough fabric to cover the affected area, allowing for a minimum of 2 inches to extend beyond the skin fold for moisture evaporation.  3.  Lay a single layer of fabric in the skin fold, placing one edge into the base of the fold. Gently smooth the rest of the fabric over the skin, keeping it flat.  4.  Leave at least 2  inches of fabric exposed outside the skin fold.  5.  Secure the fabric in one of several ways: with the skin fold, with a small amount of skin-friendly tape, or tucked under clothing.  6.  Remove the fabric before bathing and reuse it when finished. When removing, gently separate the skin fold and lift away.     Helpful Hints     1. InterDry  can be written on with a ballpoint pen. It may be helpful to label each piece of InterDry  with the date you started using it.  2.  Each piece of InterDry  may be used up to 5 days, depending on fabric soiling, odor, amount of moisture and general skin condition. Replace InterDry  if it becomes soiled with blood, urine or stool.  3.  Do not use creams, ointments, or powders with InterDry  as it may interfere with product efficacy.        Mood d/o  Financial/Environmental Concerns:      Comment: lives alone; has a friend  Plan: may need SW consult to plug into resources in future so she can stay at home     - Lexapro, will continue      Follow-Up      Specific recommendations to be addressed at the follow up visit: bumex    Medication changes: as above     Follow up labs/imaging: BMP    Other specialty follow-up not included in DC orders: no     Discharge Medications      Current Discharge Medication List        START taking these medications    Details   apixaban ANTICOAGULANT (ELIQUIS) 5 MG tablet Take 1 tablet (5 mg) by mouth 2 times daily.  Qty: 30 tablet, Refills: 0      calcium carbonate (TUMS) 500 MG chewable tablet Take 1 tablet (500 mg) by mouth 4 times daily as needed for heartburn.  Qty: 30 tablet, Refills: 0      guaiFENesin (ROBITUSSIN) 20 mg/mL liquid Take 10 mLs (200 mg) by mouth every 4 hours as needed for other (secretion expectorant).  Qty: 30 mL, Refills: 0      melatonin 5 MG tablet Take 1 tablet (5 mg) by mouth nightly as needed for sleep.  Qty: 30 tablet, Refills: 0      nitroFURantoin macrocrystal-monohydrate (MACROBID) 100 MG capsule Take 1 capsule (100  mg) by mouth every 12 hours for 3 days.  Qty: 6 capsule, Refills: 0      sennosides (SENOKOT) 8.6 MG tablet Take 1 tablet by mouth 2 times daily as needed for constipation.  Qty: 20 tablet, Refills: 0           CONTINUE these medications which have NOT CHANGED    Details   acetaminophen (TYLENOL) 650 MG CR tablet Take 650 mg by mouth every 8 hours as needed for mild pain or fever.      aspirin (ASPIRIN LOW DOSE) 81 MG EC tablet Take 1 tablet (81 mg) by mouth daily.  Qty: 90 tablet, Refills: 1    Associated Diagnoses: Chronic heart failure with preserved ejection fraction (H)      bumetanide (BUMEX) 2 MG tablet Take 4 tablets (8 mg) once daily for 1 week starting on 3/17, then decrease back to to 3 tablets (6 mg) once daily.  Qty: 270 tablet, Refills: 3    Associated Diagnoses: Chronic heart failure with preserved ejection fraction (H)      Emollient (GOLD BOND MEDICATED BODY EX) Externally apply 1 Application topically 2 times daily as needed      empagliflozin (JARDIANCE) 10 MG TABS tablet Take 1 tablet (10 mg) by mouth daily.  Qty: 90 tablet, Refills: 1    Associated Diagnoses: Acute on chronic congestive heart failure, unspecified heart failure type (H)      escitalopram (LEXAPRO) 10 MG tablet Take 1 tablet (10 mg) by mouth daily.  Qty: 90 tablet, Refills: 1    Associated Diagnoses: Generalized anxiety disorder; Moderate episode of recurrent major depressive disorder (H)      miconazole (MICATIN) 2 % external powder Apply topically 2 times daily as needed for itching or other.      potassium chloride sheila ER (KLOR-CON M10) 10 MEQ CR tablet Take 3 tablets (30 mEq) by mouth daily.  Qty: 90 tablet, Refills: 1    Associated Diagnoses: Chronic heart failure with preserved ejection fraction (H)      senna-docusate (SENOKOT-S/PERICOLACE) 8.6-50 MG tablet Take 1 tablet by mouth 2 times daily as needed for constipation.      simvastatin (ZOCOR) 10 MG tablet Take 1 tablet (10 mg) by mouth at bedtime.  Qty: 30 tablet,  Refills: 1    Associated Diagnoses: Ischemic cerebrovascular accident (CVA) (H)      spironolactone (ALDACTONE) 25 MG tablet Take 0.5 tablets (12.5 mg) by mouth daily.  Qty: 30 tablet, Refills: 0    Associated Diagnoses: Acute on chronic congestive heart failure, unspecified heart failure type (H)              Pertinent Findings / Procedures     Results for orders placed or performed during the hospital encounter of 03/22/25   CT Abdomen Pelvis w Contrast    Narrative    EXAM: CT ABDOMEN PELVIS W CONTRAST  LOCATION: [facility]  DATE: 3/22/2025    INDICATION: Abdominal pain, bloating, history of small bowel obstruction  COMPARISON: 7/18/2024  TECHNIQUE: CT scan of the abdomen and pelvis was performed following injection of IV contrast. Multiplanar reformats were obtained. Dose reduction techniques were used.  CONTRAST: 90ml isovue 370    FINDINGS:   LOWER CHEST: Unremarkable.    HEPATOBILIARY: Mildly lobulated hepatic surface contour. Status post cholecystectomy. No biliary dilatation.    PANCREAS: Normal.    SPLEEN: Normal.    ADRENAL GLANDS: Normal.    KIDNEYS/BLADDER: Few nonobstructing intrarenal calculi bilaterally measuring up to 0.3 cm. No hydronephrosis. Urinary bladder appears normal.    BOWEL: Moderate amount of stool throughout the colon. No obstruction or inflammatory change. Normal appendix. No evidence of acute appendicitis.    LYMPH NODES: Unchanged prominent but nonenlarged retroperitoneal lymph nodes. No new lymphadenopathy identified.    VASCULATURE: Mild atherosclerotic calcifications.    PELVIC ORGANS: Redemonstrated right ovarian dermoid cyst measuring 9.0 x 7.9 cm. Uterus and left adnexa appear unremarkable.    MUSCULOSKELETAL: Multilevel degenerative changes of the spine. Grade 1 anterolisthesis of L4 on L5.      Impression    IMPRESSION:   1.  No acute findings or inflammatory changes in the abdomen or pelvis.    2.  Mildly lobulated hepatic surface contour, suspicious  for chronic hepatocellular disease.   XR Chest 1 View    Narrative    EXAM: XR CHEST 1 VIEW  LOCATION: [facility]  DATE: 3/23/2025    INDICATION: Dyspnea  COMPARISON: CT chest 2/11/2025. Chest radiographs 7/22/2024.      Impression    IMPRESSION: Somewhat low lung volumes. Elevation of the right hemidiaphragm. Enlarged heart. Prominent pulmonary vasculature suggestive of congestion. No focal lung consolidation, pleural effusion or pneumothorax.   US Lower Extremity Venous Duplex Bilateral    Narrative    EXAM: ULTRASOUND VENOUS BILATERAL LOWER EXTREMITIES WITH DOPPLER  LOCATION: [facility]  DATE/TIME: 03/23/2025, 6:41 AM CDT    INDICATION: Swelling.  COMPARISON: None.    TECHNIQUE: Venous Duplex ultrasound of bilateral lower extremities with and without compression, augmentation and duplex. Color flow and spectral Doppler with waveform analysis performed.    FINDINGS: Exam includes the common femoral, femoral, popliteal veins as well as segmentally visualized deep calf veins and greater saphenous vein. This study is overall moderately limited due to large body habitus. Note that the evaluation of the calf   veins is prohibiting due to suboptimal visualization.    RIGHT: Normal compressibility of the common femoral, femoral, popliteal and great saphenous veins. Unremarkable Doppler waveform evaluation of the common femoral, femoral and popliteal veins.    LEFT: A short segment of a vein in the deep soft tissues of the mid left thigh appears noncompressible, suggesting occlusive thrombus. Normal compressibility of the common femoral, femoral and popliteal veins. Unremarkable Doppler waveform evaluation of   the common femoral, femoral and popliteal veins. 2.3 x 1.5 x 1.0 cm avascular hypoechoic structure in the deep soft tissues of the mid left thigh. This is nonspecific, could represent a small lymph node or cyst.      Impression    IMPRESSION:   1.  A short segment  of a vein in the deep soft tissues of the mid left thigh appears noncompressible. This likely corresponds to a segment of age-indeterminate occlusive thrombus within the left greater saphenous vein.  2.  Note that this study is overall moderately limited due to large body habitus. The calf veins were not able to be adequately evaluated. Otherwise, no convincing evidence of thrombus in the deep veins of the visualized portions of bilateral lower   extremities.         *Note: Due to a large number of results and/or encounters for the requested time period, some results have not been displayed. A complete set of results can be found in Results Review.     Recent Labs   Lab 03/27/25  0859 03/25/25  0937 03/24/25  0706 03/24/25  0705 03/23/25  0713   WBC 4.0 5.4 7.4  --  12.0*   HGB 14.3 14.9 13.4  --  14.6   MCV 88 90 91  --  92    165 151  --  134*   INR  --   --   --   --  1.30*    137  --  138 138   POTASSIUM 3.7 4.3  --  3.9  3.8 4.0   CHLORIDE 100 99  --  102 101   CO2 29 29  --  28 26   BUN 20.6* 24.9*  --  22.1* 22.9*   CR 0.83 0.97*  --  1.01* 1.07*   ANIONGAP 8 9  --  8 11   EVITA 9.9 10.2  --  9.4 9.6   GLC 74 71  --  91 103*   ALBUMIN  --   --   --  3.1* 3.5   PROTTOTAL  --   --   --  6.9 7.4   BILITOTAL  --   --   --  2.8* 3.1*   ALKPHOS  --   --   --  72 75   ALT  --   --   --  9 8   AST  --   --   --  18 21       Diet / Activity / Follow-Up     Discharge diet: Cardiac   Discharge activity: Activity as tolerated   Discharge follow-up: Has PCP next week      Pending Studies      Unresulted Labs Ordered in the Past 30 Days of this Admission       No orders found from 2/20/2025 to 3/23/2025.            TELEHEALTH ENCOUNTER ATTESTATION: As the provider for this telehealth service, I attest that I introduced myself to the patient, provided my credentials, disclosed my location, and determined that, based on a review of the patient's chart and/or a discussion with members of the patient's treatment  team, telemedicine via a real-time, two-way, interactive audio and video platform is an appropriate and effective means of providing this service. The patient and I mutually agree that this visit is appropriate for telemedicine as well.  Originating site (patient location): Patient home, [place]  Distant site (telehealth provider location): Provider home/office, [place]  Visit start time: 1200  Visit end time: 1230  Total time spent on discharge coordination:  35 minutes.  Patient was seen and examined today.    Sara Wong CNP   [facility] IN HOME  Sara Wong CNP on 3/28/2025 at 12:24 PM    Oxygen Documentation  I certify that this patient, Bess Enamorado has been under my care (or a nurse practitioner or physican's assistant working with me). This is the face-to-face encounter for oxygen medical necessity.      At the time of this encounter, I have reviewed the qualifying testing and have determined that supplemental oxygen is reasonable and necessary and is expected to improve the patient's condition in a home setting.         Patient has continued oxygen desaturation due to Chronic Heart Failure I50  Chronic Respiratory Failure with Hypoxia J96.11.    If portability is ordered, is the patient mobile within the home? yes    Was this visit performed as a telehealth visit: Yes: What is the reason an in-person visit could not be done: Home hospital program pt is homebound        .home

## 2025-03-30 ENCOUNTER — PATIENT OUTREACH (OUTPATIENT)
Dept: CARE COORDINATION | Facility: CLINIC | Age: 50
End: 2025-03-30
Payer: COMMERCIAL

## 2025-03-30 NOTE — PROGRESS NOTES
"[facility]  Transitions of Care Outreach  Chief Complaint   Patient presents with    Clinic Care Coordination - Post Hospital       Most Recent Admission Date: 3/22/2025   Most Recent Admission Diagnosis: Shortness of breath - R06.02  Pyelonephritis, acute - N10  Acute pulmonary edema (H) - J81.0  Cystitis - N30.90     Most Recent Discharge Date: 3/28/2025   Most Recent Discharge Diagnosis: Cystitis - N30.90  Shortness of breath - R06.02  Pyelonephritis, acute - N10  Lymphedema of both lower extremities - I89.0  Acute on chronic respiratory failure with hypoxia and hypercapnia (H) - J96.21, J96.22  Obesity hypoventilation syndrome (H) - E66.2  Class 3 severe obesity due to excess calories with serious comorbidity and body mass index (BMI) of 50.0 to 59.9 in adult (H) - E66.813, Z68.43, E66.01  Chronic heart failure with preserved ejection fraction (H) - I50.32  Acute and chronic respiratory failure with hypoxia (H) - J96.21  Acute respiratory failure with hypoxia (H) - J96.01  Pulmonary hypertension (H) - I27.20     Transitions of Care Assessment    Discharge Assessment  How are you doing now that you are home?: \"I'm doing well\"  How are your symptoms? (Red Flag symptoms escalate to triage hotline per guidelines): Improved  Do you know how to contact your clinic care team if you have future questions or changes to your health status? : Yes  Does the patient have their discharge instructions? : Yes  Does the patient have questions regarding their discharge instructions? : No  Were you started on any new medications or were there changes to any of your previous medications? : Yes  Does the patient have all of their medications?: Yes  Do you have questions regarding any of your medications? : No  Do you have all of your needed medical supplies or equipment (DME)?  (i.e. oxygen tank, CPAP, cane, etc.): Yes    Post-op ([facility] CTA Only)  If the patient had a surgery or procedure, do they have any " questions for a nurse?: No    [facility] Explained and offered Care Coordination support to eligible patients: Yes    Patient accepted? No    Follow up Plan     Discharge Follow-Up  Discharge follow up appointment scheduled in alignment with recommended follow up timeframe or Transitions of Risk Category? (Low = within 30 days; Moderate= within 14 days; High= within 7 days): Yes  Discharge Follow Up Appointment Date: 04/01/25  Discharge Follow Up Appointment Scheduled with?: Primary Care Provider    Future Appointments   Date Time Provider Department Center   4/1/2025  9:30 AM Mikala Luna MD [facility]   4/15/2025 12:55 PM Christin Holt APRN CNP [facility]IN   4/22/2025 10:30 AM Yanet Pappas PA-C [facility]   6/11/2025  8:00 AM Onur Chaudhry MD [facility]   6/25/2025 10:20 AM [facility]   7/2/2025  2:30 PM  LAB [facility]   7/2/2025  3:30 PM Cyndie Joyner MD [facility]       Outpatient Plan as outlined on AVS reviewed with patient.    For any urgent concerns, please contact our 24 hour nurse triage line: [phone] ([phone])         Rajani Murry  Community Health Worker  [facility]    *Connected Care Resource Team does NOT follow patient ongoing. Referrals are identified based on internal discharge reports and the outreach is to ensure patient has an understanding of their discharge instructions.

## 2025-03-31 ENCOUNTER — PATIENT OUTREACH (OUTPATIENT)
Dept: CARE COORDINATION | Facility: CLINIC | Age: 50
End: 2025-03-31
Payer: COMMERCIAL

## 2025-03-31 ENCOUNTER — TELEPHONE (OUTPATIENT)
Dept: FAMILY MEDICINE | Facility: CLINIC | Age: 50
End: 2025-03-31
Payer: COMMERCIAL

## 2025-03-31 NOTE — TELEPHONE ENCOUNTER
Home Care is calling regarding an established patient with [facility].  Requesting orders from: Mikala Luna  RN APPROVED: RN able to provide verbal orders.  Home Care will send orders for signature.  RN will close encounter.  Is this a request for a temporary pause in the home care episode?  No    Orders Requested    Skilled Nursing  Request for resumption in care.   2 times a week for 5 weeks  RN gave verbal order: Yes      Physical Therapy  Request for initial evaluation and treatment (one time)   RN gave verbal order: Yes          Diane Hernandez RN

## 2025-03-31 NOTE — PROGRESS NOTES
Clinic Care Coordination Contact  Care Coordination Clinician Chart Review    Situation: Patient chart reviewed by care coordinator.    Background: Clinic Care Coordination Referral received from inpatient care team for transition handoff communication following hospital admission.    Assessment: Upon chart review, patient is not a candidate for Primary Care Clinic Care Coordination enrollment due to reason stated below:  [facility] team has already completed/attempted post hospital discharge TCM patient outreach.    Plan/Recommendations: Clinic Care Coordination Referral/order cancelled. RN/SW CC will perform no further monitoring/outreaches at this time and will remain available as needed. If new needs arise, a new Care Coordination Referral may be placed.    Tiffany Lopez RN Care Coordination   [facility]  Email: [email or web address]  Phone: [phone]

## 2025-04-01 ENCOUNTER — TELEPHONE (OUTPATIENT)
Dept: FAMILY MEDICINE | Facility: CLINIC | Age: 50
End: 2025-04-01

## 2025-04-01 ENCOUNTER — OFFICE VISIT (OUTPATIENT)
Dept: FAMILY MEDICINE | Facility: CLINIC | Age: 50
End: 2025-04-01
Payer: COMMERCIAL

## 2025-04-01 VITALS
OXYGEN SATURATION: 86 % | SYSTOLIC BLOOD PRESSURE: 126 MMHG | WEIGHT: 254 LBS | TEMPERATURE: 97.8 F | BODY MASS INDEX: 43.6 KG/M2 | DIASTOLIC BLOOD PRESSURE: 66 MMHG | HEART RATE: 83 BPM

## 2025-04-01 DIAGNOSIS — S81.809D OPEN WOUND OF KNEE, LEG, AND ANKLE, UNSPECIFIED LATERALITY, SUBSEQUENT ENCOUNTER: ICD-10-CM

## 2025-04-01 DIAGNOSIS — S91.009D OPEN WOUND OF KNEE, LEG, AND ANKLE, UNSPECIFIED LATERALITY, SUBSEQUENT ENCOUNTER: ICD-10-CM

## 2025-04-01 DIAGNOSIS — S31.809D WOUND OF BUTTOCK, UNSPECIFIED LATERALITY, SUBSEQUENT ENCOUNTER: ICD-10-CM

## 2025-04-01 DIAGNOSIS — I50.32 CHRONIC HEART FAILURE WITH PRESERVED EJECTION FRACTION (H): ICD-10-CM

## 2025-04-01 DIAGNOSIS — I63.9 ISCHEMIC CEREBROVASCULAR ACCIDENT (CVA) (H): ICD-10-CM

## 2025-04-01 DIAGNOSIS — G35 MULTIPLE SCLEROSIS (H): Chronic | ICD-10-CM

## 2025-04-01 DIAGNOSIS — S81.009D OPEN WOUND OF KNEE, LEG, AND ANKLE, UNSPECIFIED LATERALITY, SUBSEQUENT ENCOUNTER: ICD-10-CM

## 2025-04-01 DIAGNOSIS — G47.33 OSA ON CPAP: ICD-10-CM

## 2025-04-01 DIAGNOSIS — I50.9 ACUTE ON CHRONIC CONGESTIVE HEART FAILURE, UNSPECIFIED HEART FAILURE TYPE (H): ICD-10-CM

## 2025-04-01 DIAGNOSIS — N39.0 RECURRENT UTI: Primary | ICD-10-CM

## 2025-04-01 DIAGNOSIS — I82.492 ACUTE DEEP VEIN THROMBOSIS (DVT) OF OTHER SPECIFIED VEIN OF LEFT LOWER EXTREMITY (H): ICD-10-CM

## 2025-04-01 DIAGNOSIS — E66.813 CLASS 3 SEVERE OBESITY DUE TO EXCESS CALORIES WITH SERIOUS COMORBIDITY AND BODY MASS INDEX (BMI) OF 50.0 TO 59.9 IN ADULT (H): ICD-10-CM

## 2025-04-01 DIAGNOSIS — E66.01 CLASS 3 SEVERE OBESITY DUE TO EXCESS CALORIES WITH SERIOUS COMORBIDITY AND BODY MASS INDEX (BMI) OF 50.0 TO 59.9 IN ADULT (H): ICD-10-CM

## 2025-04-01 LAB
ANION GAP SERPL CALCULATED.3IONS-SCNC: 13 MMOL/L (ref 7–15)
BUN SERPL-MCNC: 17.1 MG/DL (ref 6–20)
CALCIUM SERPL-MCNC: 10.3 MG/DL (ref 8.8–10.4)
CHLORIDE SERPL-SCNC: 99 MMOL/L (ref 98–107)
CREAT SERPL-MCNC: 0.72 MG/DL (ref 0.51–0.95)
EGFRCR SERPLBLD CKD-EPI 2021: >90 ML/MIN/1.73M2
GLUCOSE SERPL-MCNC: 89 MG/DL (ref 70–99)
HCO3 SERPL-SCNC: 26 MMOL/L (ref 22–29)
NT-PROBNP SERPL-MCNC: 2603 PG/ML (ref 0–900)
POTASSIUM SERPL-SCNC: 3.9 MMOL/L (ref 3.4–5.3)
SODIUM SERPL-SCNC: 138 MMOL/L (ref 135–145)

## 2025-04-01 PROCEDURE — 1111F DSCHRG MED/CURRENT MED MERGE: CPT | Performed by: FAMILY MEDICINE

## 2025-04-01 PROCEDURE — 1126F AMNT PAIN NOTED NONE PRSNT: CPT | Performed by: FAMILY MEDICINE

## 2025-04-01 PROCEDURE — 80048 BASIC METABOLIC PNL TOTAL CA: CPT | Performed by: FAMILY MEDICINE

## 2025-04-01 PROCEDURE — 3074F SYST BP LT 130 MM HG: CPT | Performed by: FAMILY MEDICINE

## 2025-04-01 PROCEDURE — 99495 TRANSJ CARE MGMT MOD F2F 14D: CPT | Performed by: FAMILY MEDICINE

## 2025-04-01 PROCEDURE — 83880 ASSAY OF NATRIURETIC PEPTIDE: CPT | Performed by: FAMILY MEDICINE

## 2025-04-01 PROCEDURE — 36415 COLL VENOUS BLD VENIPUNCTURE: CPT | Performed by: FAMILY MEDICINE

## 2025-04-01 PROCEDURE — 3078F DIAST BP <80 MM HG: CPT | Performed by: FAMILY MEDICINE

## 2025-04-01 RX ORDER — SIMVASTATIN 10 MG
10 TABLET ORAL AT BEDTIME
Qty: 90 TABLET | Refills: 3 | Status: ON HOLD | OUTPATIENT
Start: 2025-04-01

## 2025-04-01 ASSESSMENT — PATIENT HEALTH QUESTIONNAIRE - PHQ9
SUM OF ALL RESPONSES TO PHQ QUESTIONS 1-9: 2
10. IF YOU CHECKED OFF ANY PROBLEMS, HOW DIFFICULT HAVE THESE PROBLEMS MADE IT FOR YOU TO DO YOUR WORK, TAKE CARE OF THINGS AT HOME, OR GET ALONG WITH OTHER PEOPLE: NOT DIFFICULT AT ALL
SUM OF ALL RESPONSES TO PHQ QUESTIONS 1-9: 2

## 2025-04-01 ASSESSMENT — PAIN SCALES - GENERAL: PAINLEVEL_OUTOF10: NO PAIN (0)

## 2025-04-01 NOTE — PROGRESS NOTES
"  Assessment & Plan     Recurrent UTI's  Currently asymptomatic    Acute deep vein thrombosis (DVT) of other specified vein of left lower extremity (H)  This is her second DVT, also h/o PE.    Given there is no end in sight to her significant immobility and this is the second clot, I would advocate for long term/lifelong anticoagulation.    Patient agrees with plan.   - apixaban ANTICOAGULANT (ELIQUIS) 5 MG tablet; Take 1 tablet (5 mg) by mouth 2 times daily.    Acute on chronic congestive heart failure, unspecified heart failure type (H)  Follow up with [facility] is scheduled     NARCISA on CPAP   Has follow up planned with sleep    Class 3 severe obesity due to excess calories with serious comorbidity and body mass index (BMI) of 50.0 to 59.9 in adult (H)  Stopped wegovy due to side effects    Ischemic cerebrovascular accident (CVA) (H)  Continue statin  - simvastatin (ZOCOR) 10 MG tablet; Take 1 tablet (10 mg) by mouth at bedtime.    Open wound of knee, leg, and ankle, unspecified laterality, subsequent encounter  Wound of buttock, unspecified laterality, subsequent encounter  Has homecare nurse doing wound care      Multiple sclerosis (H)  Follow up with neurology        MED REC REQUIRED  Post Medication Reconciliation Status: discharge medications reconciled, continue medications without change  BMI  Estimated body mass index is 43.6 kg/m  as calculated from the following:    Height as of 3/22/25: 1.626 m (5' 4\").    Weight as of this encounter: 115.2 kg (254 lb).             Cornell Kellogg is a 49 year old, presenting for the following health issues:  Respiratory Problems        4/1/2025     9:22 AM   Additional Questions   Roomed by Vero REAL CMA   Accompanied by Self     HPI          3/30/2025   Post Discharge Outreach   How are you doing now that you are home? \"I'm doing well\"   How are your symptoms? (Red Flag symptoms escalate to triage hotline per guidelines) Improved   Does the patient have their " discharge instructions?  Yes   Does the patient have questions regarding their discharge instructions?  No   Were you started on any new medications or were there changes to any of your previous medications?  Yes   Does the patient have all of their medications? Yes   Do you have questions regarding any of your medications?  No   Do you have all of your needed medical supplies or equipment (DME)?  (i.e. oxygen tank, CPAP, cane, etc.) Yes   Discharge Follow Up Appointment Date 4/1/2025   Discharge Follow Up Appointment Scheduled with? Primary Care Provider       Hospital Follow-up Visit:    Hospital/Nursing Home/ Rehab Facility: [facility]  Most Recent Admission Date: 3/22/2025 to 3/24 at [place] and then [facility] Home from 3/24 - 3/28  Most Recent Admission Diagnosis: Shortness of breath - R06.02  Pyelonephritis, acute - N10  Acute pulmonary edema (H) - J81.0  Cystitis - N30.90     Most Recent Discharge Date: 3/28/2025   Most Recent Discharge Diagnosis: Cystitis - N30.90  Shortness of breath - R06.02  Pyelonephritis, acute - N10  Lymphedema of both lower extremities - I89.0  Acute on chronic respiratory failure with hypoxia and hypercapnia (H) - J96.21, J96.22  Obesity hypoventilation syndrome (H) - E66.2  Class 3 severe obesity due to excess calories with serious comorbidity and body mass index (BMI) of 50.0 to 59.9 in adult (H) - E66.813, Z68.43, E66.01  Chronic heart failure with preserved ejection fraction (H) - I50.32  Acute and chronic respiratory failure with hypoxia (H) - J96.21  Acute respiratory failure with hypoxia (H) - J96.01  Pulmonary hypertension (H) - I27.20   Was the patient in the ICU or did the patient experience delirium during hospitalization?  No  Do you have any other stressors you would like to discuss with your provider? No    Problems taking medications regularly:  None  Medication changes since discharge: Eliquis 5mg bid, Macrobid 100mg bid x3 days,  senokot 8.6mg bid prn  Problems adhering to non-medication therapy:  None    Summary of hospitalization:  [facility] hospital discharge summary reviewed  Diagnostic Tests/Treatments reviewed.  Follow up needed: cardiology, sleep  Other Healthcare Providers Involved in Patient s Care:         Homecare - [facility] homecare (RN and PT)  Update since discharge: She finished antibiotic last night. Wound on inner left thigh is bleeding and there is brown liquid.  No fevers.        Plan of care communicated with patient           HOSPITAL IN HOME   Cystitis with severe sepsis possibly E. coli sepsis (H)(in the past has grown E. coli in the urine as well as Enterococcus bacteremia)  Comment: Microbiological studies-  Blood cultures negative x 2   - IV antibiotics- empirically  given rocephin in ER but will broaden to IV Zosyn, due to previously seen Enterococcus to cover broad-spectrum coverage for gram-positive negatives and E coli UTI is sensitive to ceftriaxone, transitioned to oral ABX  Plan: continue nitrofurantoin 100 mg po BID x 5 days  CRP improved; plan to discontinue to home care this weekend; homebound, so needs meds couriered out; RN working on discontinue plan for tomorrow     Acute on chronic respiratory failure with hypoxemia (H)  Class 3 severe obesity due to excess calories with serious comorbidity and body mass index (BMI) of 50.0 to 59.9 in adult (H) Obesity hypoventilation syndrome (H)  Pulm HTN, with chronic hypoxia  Shortness of breath  Acute pulmonary edema (H)  Comment: multifactorial acute resp failure , baseline 3 L NC 98%  Likely due to sepsis, obesity hypoventilation, new DVT and Acute diastolic CHF as evidenced by cxr luid overload.    Plan: tx with the diuretics and antibiotic  BMP improved  Follows with pulm here,    Out pt, PT eval and tx for lymphedema --has lymphedema wraps and elevated legs at home and sleeps in a recliner    Weight down as above and doing well clinically       Acute on chronic diastolic congestive heart failure (H)  Type 2 MI/Demand Ischemia due to Heart failure /sepsis  Comment: resolved dyspnea, weight down, x-ray suggest congestive heart failure likely consistent with the possible acute heart failure  -  echocardiogram with recent normal EF      Plan: continue oral bumex and monitor creat and K &Mag with it and will adjust accordingly. BMP stable today   Monitor inputs outputs and daily weights.  Sodium and fluid restriction- 2g Na and *1500 c fluid restriction with adjustment as needed for cardiorenal balance.       New DVT - left leg  Comment: on eliquis  H/o PE in remote past and was on xarelto; may need longer course of Eliquis--Plan: educated pt on this and will d/w MD length of DOAC      LE Wounds  Buttock Wound  Comment: chronic and was getting home care for wounds prior to admission   Plan: 1) L posterior thigh wound & gluteal cleft:no odor per RN  Plann: change drsg  Every 3 days   Cleanse the area with NS and pat dry.  Apply No sting film barrier to periwound skin.  Cover wound with Mepilex 4x4      2) Daily: R knee fold (can use for any other fold moisture issue)     1.  Wash skin gently with vashe. Pat, do not rub.  2.  With clean scissors, cut enough fabric to cover the affected area, allowing for a minimum of 2 inches to extend beyond the skin fold for moisture evaporation.  3.  Lay a single layer of fabric in the skin fold, placing one edge into the base of the fold. Gently smooth the rest of the fabric over the skin, keeping it flat.  4.  Leave at least 2 inches of fabric exposed outside the skin fold.  5.  Secure the fabric in one of several ways: with the skin fold, with a small amount of skin-friendly tape, or tucked under clothing.  6.  Remove the fabric before bathing and reuse it when finished. When removing, gently separate the skin fold and lift away.     Helpful Hints     1. InterDry  can be written on with a ballpoint pen. It may be  helpful to label each piece of InterDry  with the date you started using it.  2.  Each piece of InterDry  may be used up to 5 days, depending on fabric soiling, odor, amount of moisture and general skin condition. Replace InterDry  if it becomes soiled with blood, urine or stool.  3.  Do not use creams, ointments, or powders with InterDry  as it may interfere with product efficacy.      Mood d/o  Financial/Environmental Concerns:      Comment: lives alone; has a friend  Plan: may need SW consult to plug into resources in future so she can stay at home     - Lexapro, will continue      Review of Systems  Constitutional, HEENT, cardiovascular, pulmonary, gi and gu systems are negative, except as otherwise noted.    Wearing home compression stockings/lymphedema garments.            Objective    /66   Pulse 83   Temp 97.8  F (36.6  C) (Tympanic)   Wt 115.2 kg (254 lb)   LMP 03/03/2025 (Within Days)   SpO2 (!) 86%   BMI 43.60 kg/m    Body mass index is 43.6 kg/m .  Physical Exam   GENERAL: no distress, obese, and seated in wheelchair wearing oxygen  NECK: no adenopathy, no asymmetry, masses, or scars  RESP: lungs clear to auscultation - no rales, rhonchi or wheezes  CV: regular rate and rhythm, normal S1 S2, no S3 or S4, no murmur, click or rub, no peripheral edema  ABDOMEN: soft, nontender, no hepatosplenomegaly, no masses and bowel sounds normal  MS: bilateral severe lymphedema wearing compression waps            Signed Electronically by: Mikala Luna MD

## 2025-04-01 NOTE — TELEPHONE ENCOUNTER
Home Care is calling regarding an established patient with [facility].  Requesting orders from: Mikala Luna  RN APPROVED: RN able to provide verbal orders.  Home Care will send orders for signature.  RN will close encounter.  Is this a request for a temporary pause in the home care episode?  No    Orders Requested    Skilled Nursing  Request for continuation of care with increase in frequency  Frequency: add 1 additional visit this week for wound care  RN gave verbal order: Yes        Phone number Home Care can be reached at: [phone]  Okay to leave a detailed message?: Yes    Julie Behrendt, RN     Admit date: 11/2/2022   Admitting Provider: Carla Carreon MD    Discharge date: 11/10/2022  Discharging Provider: MIKE Patel      * Admission Diagnoses: Cellulitis [L03.90]    * Discharge Diagnoses:    Hospital Problems as of 11/10/2022 Never Reviewed            Codes Class Noted - Resolved POA    MARA (acute kidney injury) ([facility] [address] [place] ([facility])    [address] [facility]  [phone]              

## 2025-04-01 NOTE — TELEPHONE ENCOUNTER
Home Care is calling regarding an established patient with [facility].  Requesting orders from: Mikala Luna  RN APPROVED: RN able to provide verbal orders.  Home Care will send orders for signature.  RN will close encounter.  Is this a request for a temporary pause in the home care episode?  No    Orders Requested    Physical Therapy  1 time a week times 4 weeks.      Okay to leave a detailed message?: Yes  Contacts       Contact Date/Time Type Contact Phone/Fax    04/01/2025 12:33 PM CDT Phone (Incoming) [facility] [phone]          Mary Gramajo RN

## 2025-04-02 ENCOUNTER — HOSPITAL ENCOUNTER (INPATIENT)
Facility: HOSPITAL | Age: 50
End: 2025-04-02
Attending: FAMILY MEDICINE | Admitting: INTERNAL MEDICINE
Payer: COMMERCIAL

## 2025-04-02 DIAGNOSIS — L03.116 CELLULITIS OF LEFT LOWER EXTREMITY: ICD-10-CM

## 2025-04-02 DIAGNOSIS — G35 MULTIPLE SCLEROSIS (H): Chronic | ICD-10-CM

## 2025-04-02 DIAGNOSIS — A41.9 SEPSIS DUE TO CELLULITIS (H): ICD-10-CM

## 2025-04-02 DIAGNOSIS — I89.0 LYMPHEDEMA: Primary | ICD-10-CM

## 2025-04-02 DIAGNOSIS — K52.1 ANTIBIOTIC-ASSOCIATED DIARRHEA: ICD-10-CM

## 2025-04-02 DIAGNOSIS — J96.21 ACUTE AND CHRONIC RESPIRATORY FAILURE WITH HYPOXIA (H): ICD-10-CM

## 2025-04-02 DIAGNOSIS — L03.116 LEFT LEG CELLULITIS: ICD-10-CM

## 2025-04-02 DIAGNOSIS — T36.95XA ANTIBIOTIC-ASSOCIATED DIARRHEA: ICD-10-CM

## 2025-04-02 DIAGNOSIS — L03.90 SEPSIS DUE TO CELLULITIS (H): ICD-10-CM

## 2025-04-02 LAB
ALBUMIN SERPL BCG-MCNC: 3.6 G/DL (ref 3.5–5.2)
ALP SERPL-CCNC: 74 U/L (ref 40–150)
ALT SERPL W P-5'-P-CCNC: 11 U/L (ref 0–50)
ANION GAP SERPL CALCULATED.3IONS-SCNC: 10 MMOL/L (ref 7–15)
AST SERPL W P-5'-P-CCNC: 20 U/L (ref 0–45)
BASOPHILS # BLD AUTO: 0.1 10E3/UL (ref 0–0.2)
BASOPHILS NFR BLD AUTO: 0 %
BILIRUB SERPL-MCNC: 3 MG/DL
BUN SERPL-MCNC: 18.9 MG/DL (ref 6–20)
CALCIUM SERPL-MCNC: 10.4 MG/DL (ref 8.8–10.4)
CHLORIDE SERPL-SCNC: 97 MMOL/L (ref 98–107)
CREAT SERPL-MCNC: 0.81 MG/DL (ref 0.51–0.95)
EGFRCR SERPLBLD CKD-EPI 2021: 88 ML/MIN/1.73M2
EOSINOPHIL # BLD AUTO: 0.3 10E3/UL (ref 0–0.7)
EOSINOPHIL NFR BLD AUTO: 2 %
ERYTHROCYTE [DISTWIDTH] IN BLOOD BY AUTOMATED COUNT: 17.5 % (ref 10–15)
FLUAV RNA SPEC QL NAA+PROBE: NEGATIVE
FLUBV RNA RESP QL NAA+PROBE: NEGATIVE
GLUCOSE SERPL-MCNC: 105 MG/DL (ref 70–99)
HCO3 SERPL-SCNC: 28 MMOL/L (ref 22–29)
HCT VFR BLD AUTO: 44.5 % (ref 35–47)
HGB BLD-MCNC: 14.4 G/DL (ref 11.7–15.7)
IMM GRANULOCYTES # BLD: 0.1 10E3/UL
IMM GRANULOCYTES NFR BLD: 0 %
LACTATE SERPL-SCNC: 2 MMOL/L (ref 0.7–2)
LYMPHOCYTES # BLD AUTO: 0.8 10E3/UL (ref 0.8–5.3)
LYMPHOCYTES NFR BLD AUTO: 6 %
MAGNESIUM SERPL-MCNC: 2.1 MG/DL (ref 1.7–2.3)
MCH RBC QN AUTO: 28.7 PG (ref 26.5–33)
MCHC RBC AUTO-ENTMCNC: 32.4 G/DL (ref 31.5–36.5)
MCV RBC AUTO: 89 FL (ref 78–100)
MONOCYTES # BLD AUTO: 0.4 10E3/UL (ref 0–1.3)
MONOCYTES NFR BLD AUTO: 3 %
MRSA DNA SPEC QL NAA+PROBE: NEGATIVE
NEUTROPHILS # BLD AUTO: 12.1 10E3/UL (ref 1.6–8.3)
NEUTROPHILS NFR BLD AUTO: 89 %
NRBC # BLD AUTO: 0 10E3/UL
NRBC BLD AUTO-RTO: 0 /100
PLATELET # BLD AUTO: 207 10E3/UL (ref 150–450)
POTASSIUM SERPL-SCNC: 4.3 MMOL/L (ref 3.4–5.3)
PROCALCITONIN SERPL IA-MCNC: 0.22 NG/ML
PROT SERPL-MCNC: 7.6 G/DL (ref 6.4–8.3)
RBC # BLD AUTO: 5.01 10E6/UL (ref 3.8–5.2)
RSV RNA SPEC NAA+PROBE: NEGATIVE
SA TARGET DNA: NEGATIVE
SARS-COV-2 RNA RESP QL NAA+PROBE: NEGATIVE
SODIUM SERPL-SCNC: 135 MMOL/L (ref 135–145)
WBC # BLD AUTO: 13.6 10E3/UL (ref 4–11)

## 2025-04-02 PROCEDURE — 85014 HEMATOCRIT: CPT | Performed by: FAMILY MEDICINE

## 2025-04-02 PROCEDURE — 94660 CPAP INITIATION&MGMT: CPT

## 2025-04-02 PROCEDURE — 87086 URINE CULTURE/COLONY COUNT: CPT | Performed by: FAMILY MEDICINE

## 2025-04-02 PROCEDURE — 84145 PROCALCITONIN (PCT): CPT | Performed by: FAMILY MEDICINE

## 2025-04-02 PROCEDURE — 999N000157 HC STATISTIC RCP TIME EA 10 MIN

## 2025-04-02 PROCEDURE — 96367 TX/PROPH/DG ADDL SEQ IV INF: CPT

## 2025-04-02 PROCEDURE — 85025 COMPLETE CBC W/AUTO DIFF WBC: CPT | Performed by: FAMILY MEDICINE

## 2025-04-02 PROCEDURE — 36415 COLL VENOUS BLD VENIPUNCTURE: CPT | Performed by: FAMILY MEDICINE

## 2025-04-02 PROCEDURE — 87637 SARSCOV2&INF A&B&RSV AMP PRB: CPT | Performed by: FAMILY MEDICINE

## 2025-04-02 PROCEDURE — 250N000013 HC RX MED GY IP 250 OP 250 PS 637: Performed by: FAMILY MEDICINE

## 2025-04-02 PROCEDURE — 258N000003 HC RX IP 258 OP 636: Performed by: FAMILY MEDICINE

## 2025-04-02 PROCEDURE — 120N000001 HC R&B MED SURG/OB

## 2025-04-02 PROCEDURE — 96365 THER/PROPH/DIAG IV INF INIT: CPT

## 2025-04-02 PROCEDURE — 99285 EMERGENCY DEPT VISIT HI MDM: CPT | Mod: 25

## 2025-04-02 PROCEDURE — 83735 ASSAY OF MAGNESIUM: CPT | Performed by: FAMILY MEDICINE

## 2025-04-02 PROCEDURE — 87641 MR-STAPH DNA AMP PROBE: CPT | Performed by: HOSPITALIST

## 2025-04-02 PROCEDURE — 81001 URINALYSIS AUTO W/SCOPE: CPT | Performed by: FAMILY MEDICINE

## 2025-04-02 PROCEDURE — 87040 BLOOD CULTURE FOR BACTERIA: CPT | Performed by: FAMILY MEDICINE

## 2025-04-02 PROCEDURE — 250N000011 HC RX IP 250 OP 636: Performed by: FAMILY MEDICINE

## 2025-04-02 PROCEDURE — 82310 ASSAY OF CALCIUM: CPT | Performed by: FAMILY MEDICINE

## 2025-04-02 PROCEDURE — 87640 STAPH A DNA AMP PROBE: CPT | Performed by: HOSPITALIST

## 2025-04-02 PROCEDURE — 250N000013 HC RX MED GY IP 250 OP 250 PS 637: Performed by: HOSPITALIST

## 2025-04-02 PROCEDURE — 84155 ASSAY OF PROTEIN SERUM: CPT | Performed by: FAMILY MEDICINE

## 2025-04-02 PROCEDURE — 99222 1ST HOSP IP/OBS MODERATE 55: CPT | Performed by: HOSPITALIST

## 2025-04-02 PROCEDURE — 250N000011 HC RX IP 250 OP 636: Performed by: HOSPITALIST

## 2025-04-02 PROCEDURE — 83605 ASSAY OF LACTIC ACID: CPT | Performed by: FAMILY MEDICINE

## 2025-04-02 RX ORDER — PIPERACILLIN SODIUM, TAZOBACTAM SODIUM 3; .375 G/15ML; G/15ML
3.38 INJECTION, POWDER, LYOPHILIZED, FOR SOLUTION INTRAVENOUS ONCE
Status: COMPLETED | OUTPATIENT
Start: 2025-04-02 | End: 2025-04-02

## 2025-04-02 RX ORDER — LIDOCAINE 40 MG/G
CREAM TOPICAL
Status: ACTIVE | OUTPATIENT
Start: 2025-04-02

## 2025-04-02 RX ORDER — DIPHENHYDRAMINE HYDROCHLORIDE 50 MG/ML
50 INJECTION, SOLUTION INTRAMUSCULAR; INTRAVENOUS ONCE
Status: COMPLETED | OUTPATIENT
Start: 2025-04-02 | End: 2025-04-02

## 2025-04-02 RX ORDER — CALCIUM CARBONATE 500 MG/1
1000 TABLET, CHEWABLE ORAL 4 TIMES DAILY PRN
Status: ACTIVE | OUTPATIENT
Start: 2025-04-02

## 2025-04-02 RX ORDER — SIMVASTATIN 10 MG
10 TABLET ORAL AT BEDTIME
Status: DISPENSED | OUTPATIENT
Start: 2025-04-02

## 2025-04-02 RX ORDER — POTASSIUM CHLORIDE 750 MG/1
30 TABLET, EXTENDED RELEASE ORAL EVERY MORNING
Status: ON HOLD | COMMUNITY

## 2025-04-02 RX ORDER — ASPIRIN 81 MG/1
81 TABLET ORAL EVERY MORNING
Status: ON HOLD | COMMUNITY

## 2025-04-02 RX ORDER — AMOXICILLIN 250 MG
1 CAPSULE ORAL 2 TIMES DAILY PRN
Status: ACTIVE | OUTPATIENT
Start: 2025-04-02

## 2025-04-02 RX ORDER — ONDANSETRON 4 MG/1
4 TABLET, ORALLY DISINTEGRATING ORAL EVERY 6 HOURS PRN
Status: ACTIVE | OUTPATIENT
Start: 2025-04-02

## 2025-04-02 RX ORDER — ESCITALOPRAM OXALATE 10 MG/1
10 TABLET ORAL EVERY MORNING
Status: DISPENSED | OUTPATIENT
Start: 2025-04-03

## 2025-04-02 RX ORDER — SPIRONOLACTONE 25 MG/1
12.5 TABLET ORAL EVERY MORNING
Status: ON HOLD | COMMUNITY

## 2025-04-02 RX ORDER — ACETAMINOPHEN 325 MG/1
650 TABLET ORAL EVERY 4 HOURS PRN
Status: DISPENSED | OUTPATIENT
Start: 2025-04-02

## 2025-04-02 RX ORDER — ONDANSETRON 2 MG/ML
4 INJECTION INTRAMUSCULAR; INTRAVENOUS EVERY 6 HOURS PRN
Status: ACTIVE | OUTPATIENT
Start: 2025-04-02

## 2025-04-02 RX ORDER — SPIRONOLACTONE 25 MG
12.5 TABLET ORAL EVERY MORNING
Status: DISPENSED | OUTPATIENT
Start: 2025-04-03

## 2025-04-02 RX ORDER — PIPERACILLIN SODIUM, TAZOBACTAM SODIUM 3; .375 G/15ML; G/15ML
3.38 INJECTION, POWDER, LYOPHILIZED, FOR SOLUTION INTRAVENOUS EVERY 8 HOURS
Status: DISPENSED | OUTPATIENT
Start: 2025-04-02

## 2025-04-02 RX ORDER — BUMETANIDE 2 MG/1
6 TABLET ORAL EVERY MORNING
Status: DISPENSED | OUTPATIENT
Start: 2025-04-03

## 2025-04-02 RX ORDER — ACETAMINOPHEN 325 MG/1
650 TABLET ORAL ONCE
Status: COMPLETED | OUTPATIENT
Start: 2025-04-02 | End: 2025-04-02

## 2025-04-02 RX ORDER — ESCITALOPRAM OXALATE 10 MG/1
10 TABLET ORAL EVERY MORNING
Status: ON HOLD | COMMUNITY

## 2025-04-02 RX ORDER — ASPIRIN 81 MG/1
81 TABLET ORAL EVERY MORNING
Status: DISPENSED | OUTPATIENT
Start: 2025-04-03

## 2025-04-02 RX ORDER — AMOXICILLIN 250 MG
2 CAPSULE ORAL 2 TIMES DAILY PRN
Status: ACTIVE | OUTPATIENT
Start: 2025-04-02

## 2025-04-02 RX ORDER — ACETAMINOPHEN 650 MG/1
650 SUPPOSITORY RECTAL EVERY 4 HOURS PRN
Status: ACTIVE | OUTPATIENT
Start: 2025-04-02

## 2025-04-02 RX ORDER — VANCOMYCIN HYDROCHLORIDE 1 G/200ML
1000 INJECTION, SOLUTION INTRAVENOUS EVERY 12 HOURS
Status: DISPENSED | OUTPATIENT
Start: 2025-04-03

## 2025-04-02 RX ORDER — BUMETANIDE 2 MG/1
6 TABLET ORAL EVERY MORNING
Status: ON HOLD | COMMUNITY

## 2025-04-02 RX ADMIN — PIPERACILLIN AND TAZOBACTAM 3.38 G: 3; .375 INJECTION, POWDER, FOR SOLUTION INTRAVENOUS at 16:40

## 2025-04-02 RX ADMIN — APIXABAN 5 MG: 5 TABLET, FILM COATED ORAL at 20:44

## 2025-04-02 RX ADMIN — DIPHENHYDRAMINE HYDROCHLORIDE 50 MG: 50 INJECTION, SOLUTION INTRAMUSCULAR; INTRAVENOUS at 19:00

## 2025-04-02 RX ADMIN — SIMVASTATIN 10 MG: 10 TABLET, FILM COATED ORAL at 22:47

## 2025-04-02 RX ADMIN — SODIUM CHLORIDE 1000 ML: 9 INJECTION, SOLUTION INTRAVENOUS at 16:32

## 2025-04-02 RX ADMIN — SODIUM CHLORIDE 2500 MG: 0.9 INJECTION, SOLUTION INTRAVENOUS at 17:24

## 2025-04-02 RX ADMIN — ACETAMINOPHEN 650 MG: 325 TABLET ORAL at 16:46

## 2025-04-02 RX ADMIN — PIPERACILLIN AND TAZOBACTAM 3.38 G: 3; .375 INJECTION, POWDER, FOR SOLUTION INTRAVENOUS at 22:47

## 2025-04-02 ASSESSMENT — ACTIVITIES OF DAILY LIVING (ADL)
ADLS_ACUITY_SCORE: 56
ADLS_ACUITY_SCORE: 58
ADLS_ACUITY_SCORE: 56
ADLS_ACUITY_SCORE: 56
ADLS_ACUITY_SCORE: 58
ADLS_ACUITY_SCORE: 56

## 2025-04-02 ASSESSMENT — COLUMBIA-SUICIDE SEVERITY RATING SCALE - C-SSRS
1. IN THE PAST MONTH, HAVE YOU WISHED YOU WERE DEAD OR WISHED YOU COULD GO TO SLEEP AND NOT WAKE UP?: NO
6. HAVE YOU EVER DONE ANYTHING, STARTED TO DO ANYTHING, OR PREPARED TO DO ANYTHING TO END YOUR LIFE?: NO
2. HAVE YOU ACTUALLY HAD ANY THOUGHTS OF KILLING YOURSELF IN THE PAST MONTH?: NO

## 2025-04-02 NOTE — PHARMACY-VANCOMYCIN DOSING SERVICE
Pharmacy Vancomycin Initial Note  Date of Service 2025  Patient's  1975  49 year old, female    Indication: Sepsis and Skin and Soft Tissue Infection    Current estimated CrCl = Estimated Creatinine Clearance: 116.4 mL/min (based on SCr of 0.72 mg/dL).    Creatinine for last 3 days  2025:  9:51 AM Creatinine 0.72 mg/dL    Recent Vancomycin Level(s) for last 3 days  No results found for requested labs within last 3 days.      Vancomycin IV Administrations (past 72 hours)        No vancomycin orders with administrations in past 72 hours.                    Nephrotoxins and other renal medications (From now, onward)      Start     Dose/Rate Route Frequency Ordered Stop    25 1600  piperacillin-tazobactam (ZOSYN) 3.375 g vial to attach to  mL bag         3.375 g  over 30 Minutes Intravenous ONCE 25 1548      25 1600  vancomycin (VANCOCIN) 2,500 mg in 0.9% NaCl 525 mL intermittent infusion         2,500 mg  over 120 Minutes Intravenous ONCE 25 1552              Contrast Orders - past 72 hours (72h ago, onward)      None                Plan:  Load vancomycin 2500 mg (~22 mg/kg) IV x 1 ED  Please enter new consult for continued dosing    Patrick Chiang [facility]

## 2025-04-02 NOTE — ED TRIAGE NOTES
Patient was brought from home via [facility]. Patient has a hx of lymphedema with recent DVT and UTI. Patient was seen by wound nurse yesterday and today she believes she has a skin infection as she feels feverish. Patient took tylenol at home   Uses a walker     Had a difficultly standing with EMS to a wheelchair, so she was placed in a bed.      Triage Assessment (Adult)       Row Name 04/02/25 1522          Triage Assessment    Airway WDL WDL        Respiratory WDL    Respiratory WDL WDL        Skin Circulation/Temperature WDL    Skin Circulation/Temperature WDL X  wonds on LLE        Cardiac WDL    Cardiac WDL WDL        Peripheral/Neurovascular WDL    Peripheral Neurovascular WDL WDL        Cognitive/Neuro/Behavioral WDL    Cognitive/Neuro/Behavioral WDL WDL

## 2025-04-02 NOTE — MEDICATION SCRIBE - ADMISSION MEDICATION HISTORY
Medication Scribe Admission Medication History    Admission medication history is complete. The information provided in this note is only as accurate as the sources available at the time of the update.    Information Source(s): Patient and CareEverywhere/SureScripts via in-person    Pertinent Information: Patient manages her own medications. Pt reports taking all of her AM medications except her Bumex and Aldactone today 4/2/25.    Changes made to PTA medication list:  Added: None  Deleted:   Bumex 8 mg dose (taking 6 mg QAM).  Changed:   Tylenol to 1300 mg q8h PRN    Allergies reviewed with patient and updates made in EHR: yes    Medication History Completed By: Lowell Solis 4/2/2025 6:17 PM    PTA Med List   Medication Sig Last Dose/Taking    aspirin 81 MG EC tablet Take 81 mg by mouth every morning. 4/2/2025 Morning    bumetanide (BUMEX) 2 MG tablet Take 6 mg by mouth every morning. 4/1/2025 Morning    empagliflozin (JARDIANCE) 10 MG TABS tablet Take 10 mg by mouth every morning. 4/2/2025 Morning    escitalopram (LEXAPRO) 10 MG tablet Take 10 mg by mouth every morning. 4/2/2025 Morning    potassium chloride sheila ER (KLOR-CON M10) 10 MEQ CR tablet Take 30 mEq by mouth every morning. 4/2/2025 Morning    spironolactone (ALDACTONE) 25 MG tablet Take 12.5 mg by mouth every morning. 4/1/2025 Morning    acetaminophen (TYLENOL) 650 MG CR tablet Take 1,300 mg by mouth every 8 hours as needed for mild pain or fever. Past Month    apixaban ANTICOAGULANT (ELIQUIS) 5 MG tablet Take 1 tablet (5 mg) by mouth 2 times daily. 4/2/2025 Morning    Emollient (GOLD BOND MEDICATED BODY EX) Externally apply 1 Application topically 2 times daily as needed Taking As Needed    miconazole (MICATIN) 2 % external powder Apply topically 2 times daily as needed for itching or other. Taking As Needed    sennosides (SENOKOT) 8.6 MG tablet Take 1 tablet by mouth 2 times daily as needed for constipation. Taking As Needed    simvastatin (ZOCOR) 10  MG tablet Take 1 tablet (10 mg) by mouth at bedtime. 4/1/2025 Bedtime

## 2025-04-02 NOTE — ED PROVIDER NOTES
EMERGENCY DEPARTMENT ENCOUNTER      NAME: Bess Enamorado  AGE: 49 year old female  YOB: 1975  MRN: [id number]  EVALUATION DATE & TIME: No admission date for patient encounter.    PCP: Mikala Luna    ED PROVIDER: Elias Moyer M.D.    Chief Complaint   Patient presents with    Wound Infection       FINAL IMPRESSION:  1. Left leg cellulitis    2. Multiple sclerosis (H)    3. Lymphedema        ED COURSE & MEDICAL DECISION MAKING:    Pertinent Labs & Imaging studies independently interpreted by me. (See chart for details)  Reviewed most recent primary care visit from yesterday when patient was seen for follow-up of multiple medical problems including current UTI, history of DVT and currently on Eliquis, discussed with the hospitalist acute on chronic heart failure, history of CVA and continued on Zocor, open wound of buttock and lower extremities, care.  ED Course as of 04/02/25 1843 Wed Apr 02, 2025   1546 Patient seen and examined in the lobby due to critical capacity in the department, presents today with fever, left lower extremity swelling and pain.  History of lymphedema but notes increased redness and warmth of the left leg, feeling feverish today with decreased appetite.  No urinary symptoms, no cough, chronically on oxygen.  On initial exam here, patient is febrile and a little bit hypotensive, left lower extremity without edema wrap but proximal to this on the distal thigh there is well demarcated erythema which is hot to the touch.  Concern for cellulitis and given fever and hypotension, sepsis.  Spoke with charge nurse to have patient brought back to a room immediately for aggressive fluid resuscitation, antibiotic initiation, and likely admission.   1724 Labs independently interpreted by me with normal magnesium, normal procalcitonin, elevated white blood cell count, normal comprehensive panel, normal lactate.   1842 Care discussed with Dr. Cabrera, hospitalist for admission.          At the conclusion of the encounter I discussed the results of all of the tests and the disposition. The questions were answered. The patient or family acknowledged understanding and was agreeable with the care plan.     Medical Decision Making      Admit.    MIPS (CTPE, Dental pain, Brar, Sinusitis, Asthma/COPD, Head Trauma): Not Applicable    SEPSIS: None    MEDICATIONS GIVEN IN THE EMERGENCY:  Medications   vancomycin (VANCOCIN) 2,500 mg in 0.9% NaCl 525 mL intermittent infusion (2,500 mg Intravenous $New Bag 4/2/25 1724)   sodium chloride 0.9% BOLUS 1,000 mL (0 mLs Intravenous Stopped 4/2/25 1809)   acetaminophen (TYLENOL) tablet 650 mg (650 mg Oral $Given 4/2/25 1646)   piperacillin-tazobactam (ZOSYN) 3.375 g vial to attach to  mL bag (0 g Intravenous Stopped 4/2/25 1723)       NEW PRESCRIPTIONS STARTED AT TODAY'S ER VISIT  New Prescriptions    No medications on file       =================================================================    HPI    Patient information was obtained from: patient      Bess Enamorado is a 49 year old female with a pertinent history of Lymphedema, PE and DVT on Eliquis, MS, CHF, HTN, CVA, recurrent UTI's, who presents to this ED via EMS for evaluation of wound infection.    Patient seen and examined in the lobby due to critical capacity in the department, presents today with fever, left lower extremity swelling and pain. History of lymphedema but notes increased redness and warmth of the left leg, feeling feverish today with decreased appetite. No urinary symptoms, no cough, chronically on oxygen. There were no other concerns/complaints at this time.    Per chart review:  -4/1/2025: Patient was seen at [place] [facility] for admission follow up. Patient was recently admitted for UTI and acute on chronic CHF and respiratory failure. Found to have a DVT during admission and was decided to stay on Eliquis long term/lifelong. UTI was treated with Rocephin, Zosyn, and  transitioned to Macrobid x3 days at time of discharge. Currently asymptomatic for urinary symptoms. Plan to follow up with [facility] for acute on chronic CHF. Plan for wound care nurse to continue doing wound care.    REVIEW OF SYSTEMS   Review of Systems   All other systems reviewed and negative    PAST MEDICAL HISTORY:  Past Medical History:   Diagnosis Date    Acute on chronic diastolic congestive heart failure (H) 02/09/2022    Arthritis 2019    Hips    ASCUS favor benign 08/2015    Neg HPV    Deep vein thrombosis (DVT) of left lower extremity (H) 03/25/2025    Depressive disorder 2010    H/O colposcopy with cervical biopsy 09/14/2015    Bx & ECC - negative    Hypertension 2019    LSIL on Pap smear 07/2014    Neg high risk HPV    Multiple sclerosis (H) 08/29/2005 9/18/200pt dx with MS 2004--dx by neurolgist and is followed by Dr. Harris    Myocardial infarction (H)     Obese     Pneumonia due to infectious organism, unspecified laterality, unspecified part of lung 02/08/2022    Sepsis (Temp 101, , WBC 13.0) 10/23/2018    Shingles 10/2016    SIRS (systemic inflammatory response syndrome) (H) 03/04/2021    Sleep apnea     UNSPEC CONSTIPATION 09/18/2006 9/18/2006   Has tried otc suppository and otc lax.        PAST SURGICAL HISTORY:  Past Surgical History:   Procedure Laterality Date    CHOLECYSTECTOMY, LAPOROSCOPIC      Cholecystectomy, Laparoscopic    COLONOSCOPY  2007?    Bathroom issue..results normal    COMBINED CYSTOSCOPY, RETROGRADES, EXCHANGE STENT URETER(S) Right 2/21/2022    Procedure: CYSTOSCOPY, WITH right RETROGRADE PYELOGRAM AND right URETERAL STENT PLACEMENT;  Surgeon: Doyle Palomares MD;  [place] [facility] [place]    OPEN REDUCTION INTERNAL FIXATION TOE(S)  4/13/2012    Procedure:OPEN REDUCTION INTERNAL FIXATION TOE(S); Open reduction internal fixation right proximal fifth metatarsal fracture-   Anes-choice block; Surgeon:LEY, JEFFREY DUANE; Location:[facility] OR    PICC SINGLE LUMEN  PLACEMENT  3/20/2024    PICC TRIPLE LUMEN PLACEMENT  2/21/2022            CURRENT MEDICATIONS:    Current Facility-Administered Medications   Medication Dose Route Frequency Provider Last Rate Last Admin    vancomycin (VANCOCIN) 2,500 mg in 0.9% NaCl 525 mL intermittent infusion  2,500 mg Intravenous Once Elias Moyer MD   2,500 mg at 04/02/25 1724     Current Outpatient Medications   Medication Sig Dispense Refill    acetaminophen (TYLENOL) 650 MG CR tablet Take 1,300 mg by mouth every 8 hours as needed for mild pain or fever.      apixaban ANTICOAGULANT (ELIQUIS) 5 MG tablet Take 1 tablet (5 mg) by mouth 2 times daily. 90 tablet 3    aspirin 81 MG EC tablet Take 81 mg by mouth every morning.      bumetanide (BUMEX) 2 MG tablet Take 6 mg by mouth every morning.      Emollient (GOLD BOND MEDICATED BODY EX) Externally apply 1 Application topically 2 times daily as needed      empagliflozin (JARDIANCE) 10 MG TABS tablet Take 10 mg by mouth every morning.      escitalopram (LEXAPRO) 10 MG tablet Take 10 mg by mouth every morning.      miconazole (MICATIN) 2 % external powder Apply topically 2 times daily as needed for itching or other.      potassium chloride sheila ER (KLOR-CON M10) 10 MEQ CR tablet Take 30 mEq by mouth every morning.      sennosides (SENOKOT) 8.6 MG tablet Take 1 tablet by mouth 2 times daily as needed for constipation. 20 tablet 0    simvastatin (ZOCOR) 10 MG tablet Take 1 tablet (10 mg) by mouth at bedtime. 90 tablet 3    spironolactone (ALDACTONE) 25 MG tablet Take 12.5 mg by mouth every morning.         ALLERGIES:  No Known Allergies    FAMILY HISTORY:  Family History   Problem Relation Age of Onset    Neurologic Disorder Father         MS    Heart Disease Father         irregular heart rate    Diabetes Father     Melanoma Father     Sleep Apnea Father     Other Cancer Father     Cancer Maternal Grandfather         lung    Other Cancer Maternal Grandfather     Cancer Paternal Grandmother          stomach    Other Cancer Paternal Grandmother     Cancer Mother     Other Cancer Mother     Thyroid Disease Mother     Gynecology Maternal Aunt         PCOS    Hypertension Maternal Grandmother     Anxiety Disorder Maternal Grandmother     Thyroid Disease Maternal Grandmother     Anxiety Disorder Sister        SOCIAL HISTORY:   Social History     Socioeconomic History    Marital status: Single   Occupational History     Employer: [employer]   Tobacco Use    Smoking status: Never    Smokeless tobacco: Never   Vaping Use    Vaping status: Never Used   Substance and Sexual Activity    Alcohol use: Yes     Comment: social    Drug use: No    Sexual activity: Not Currently     Partners: Male     Birth control/protection: Pill   Other Topics Concern    Parent/sibling w/ CABG, MI or angioplasty before 65F 55M? No   Social History Narrative    , 3rd-5th grade     Social Drivers of Health     Financial Resource Strain: Low Risk  (3/23/2025)    Financial Resource Strain     Within the past 12 months, have you or your family members you live with been unable to get utilities (heat, electricity) when it was really needed?: No   Food Insecurity: High Risk (3/23/2025)    Food Insecurity     Within the past 12 months, did you worry that your food would run out before you got money to buy more?: Yes     Within the past 12 months, did the food you bought just not last and you didn t have money to get more?: No   Transportation Needs: High Risk (3/23/2025)    Transportation Needs     Within the past 12 months, has lack of transportation kept you from medical appointments, getting your medicines, non-medical meetings or appointments, work, or from getting things that you need?: Yes   Physical Activity: Inactive (3/21/2024)    Exercise Vital Sign     Days of Exercise per Week: 0 days     Minutes of Exercise per Session: 0 min   Stress: No Stress Concern Present (12/4/2020)    [employer] [place] of  "Occupational Health - Occupational Stress Questionnaire     Feeling of Stress : Only a little   Social Connections: Unknown (3/1/2022)    Social Connection and Isolation Panel [NHANES]     Frequency of Communication with Friends and Family: Twice a week     Frequency of Social Gatherings with Friends and Family: Twice a week   Interpersonal Safety: High Risk (3/23/2025)    Interpersonal Safety     Do you feel physically and emotionally safe where you currently live?: No     Within the past 12 months, have you been hit, slapped, kicked or otherwise physically hurt by someone?: No     Within the past 12 months, have you been humiliated or emotionally abused in other ways by your partner or ex-partner?: No   Housing Stability: Low Risk  (3/23/2025)    Housing Stability     Do you have housing? : Yes     Are you worried about losing your housing?: No       VITALS:  /56   Pulse 100   Temp 100.4  F (38  C) (Oral)   Resp 20   Ht 1.626 m (5' 4\")   Wt 115.2 kg (254 lb)   LMP 03/03/2025 (Within Days)   SpO2 95%   BMI 43.60 kg/m      PHYSICAL EXAM:    Physical Exam  Vitals and nursing note reviewed.   Constitutional:       Appearance: Normal appearance.   HENT:      Head: Normocephalic and atraumatic.      Right Ear: External ear normal.      Left Ear: External ear normal.      Nose: Nose normal.      Mouth/Throat:      Mouth: Mucous membranes are moist.   Eyes:      Extraocular Movements: Extraocular movements intact.      Conjunctiva/sclera: Conjunctivae normal.      Pupils: Pupils are equal, round, and reactive to light.   Cardiovascular:      Rate and Rhythm: Regular rhythm. Tachycardia present.      Comments: Bilateral lymphedema with marked erythema and warmth to distal thigh.  Pulmonary:      Effort: Pulmonary effort is normal.      Breath sounds: Normal breath sounds. No wheezing or rales.   Abdominal:      General: Abdomen is flat. There is no distension.      Palpations: Abdomen is soft.      " Tenderness: There is no abdominal tenderness. There is no guarding.   Musculoskeletal:         General: Normal range of motion.      Cervical back: Normal range of motion and neck supple.      Right lower leg: Edema present.      Left lower leg: Edema present.   Lymphadenopathy:      Cervical: No cervical adenopathy.   Skin:     General: Skin is warm and dry.   Neurological:      General: No focal deficit present.      Mental Status: She is alert and oriented to person, place, and time. Mental status is at baseline.      Comments: No gross focal neurologic deficits   Psychiatric:         Mood and Affect: Mood normal.         Behavior: Behavior normal.         Thought Content: Thought content normal.          LAB:  All pertinent labs reviewed and interpreted.  Results for orders placed or performed during the hospital encounter of 04/02/25   Comprehensive metabolic panel   Result Value Ref Range    Sodium 135 135 - 145 mmol/L    Potassium 4.3 3.4 - 5.3 mmol/L    Carbon Dioxide (CO2) 28 22 - 29 mmol/L    Anion Gap 10 7 - 15 mmol/L    Urea Nitrogen 18.9 6.0 - 20.0 mg/dL    Creatinine 0.81 0.51 - 0.95 mg/dL    GFR Estimate 88 >60 mL/min/1.73m2    Calcium 10.4 8.8 - 10.4 mg/dL    Chloride 97 (L) 98 - 107 mmol/L    Glucose 105 (H) 70 - 99 mg/dL    Alkaline Phosphatase 74 40 - 150 U/L    AST 20 0 - 45 U/L    ALT 11 0 - 50 U/L    Protein Total 7.6 6.4 - 8.3 g/dL    Albumin 3.6 3.5 - 5.2 g/dL    Bilirubin Total 3.0 (H) <=1.2 mg/dL   Lactic acid whole blood with 1x repeat in 2 hr when >2   Result Value Ref Range    Lactic Acid, Initial 2.0 0.7 - 2.0 mmol/L   Result Value Ref Range    Procalcitonin 0.22 <0.50 ng/mL   Influenza A/B, RSV and SARS-CoV2 PCR (COVID-19) Nose    Specimen: Nose; Swab   Result Value Ref Range    Influenza A PCR Negative Negative    Influenza B PCR Negative Negative    RSV PCR Negative Negative    SARS CoV2 PCR Negative Negative   Result Value Ref Range    Magnesium 2.1 1.7 - 2.3 mg/dL   CBC with  platelets and differential   Result Value Ref Range    WBC Count 13.6 (H) 4.0 - 11.0 10e3/uL    RBC Count 5.01 3.80 - 5.20 10e6/uL    Hemoglobin 14.4 11.7 - 15.7 g/dL    Hematocrit 44.5 35.0 - 47.0 %    MCV 89 78 - 100 fL    MCH 28.7 26.5 - 33.0 pg    MCHC 32.4 31.5 - 36.5 g/dL    RDW 17.5 (H) 10.0 - 15.0 %    Platelet Count 207 150 - 450 10e3/uL    % Neutrophils 89 %    % Lymphocytes 6 %    % Monocytes 3 %    % Eosinophils 2 %    % Basophils 0 %    % Immature Granulocytes 0 %    NRBCs per 100 WBC 0 <1 /100    Absolute Neutrophils 12.1 (H) 1.6 - 8.3 10e3/uL    Absolute Lymphocytes 0.8 0.8 - 5.3 10e3/uL    Absolute Monocytes 0.4 0.0 - 1.3 10e3/uL    Absolute Eosinophils 0.3 0.0 - 0.7 10e3/uL    Absolute Basophils 0.1 0.0 - 0.2 10e3/uL    Absolute Immature Granulocytes 0.1 <=0.4 10e3/uL    Absolute NRBCs 0.0 10e3/uL       RADIOLOGY:  Reviewed all pertinent imaging. Please see official radiology report.  No orders to display       I, Marilou Joaquin, am serving as a scribe to document services personally performed by Dr. Moyer based on my observation and the provider's statements to me. I, Elias Moyer MD attest that Marilou Joaquin is acting in a scribe capacity, has observed my performance of the services and has documented them in accordance with my direction.    Elias Moyer M.D.  Emergency Medicine  [facility] EMERGENCY DEPARTMENT  [address] [id number] [phone]  Dept: [phone]       Elias Moyer MD  04/02/25 9775

## 2025-04-02 NOTE — PHARMACY-VANCOMYCIN DOSING SERVICE
"Pharmacy Vancomycin Initial Note  Date of Service 2025  Patient's  1975  49 year old, female    Indication: Skin and Soft Tissue Infection    Current estimated CrCl = Estimated Creatinine Clearance: 103.5 mL/min (based on SCr of 0.81 mg/dL).    Creatinine for last 3 days  2025:  9:51 AM Creatinine 0.72 mg/dL  2025:  4:27 PM Creatinine 0.81 mg/dL    Recent Vancomycin Level(s) for last 3 days  No results found for requested labs within last 3 days.      Vancomycin IV Administrations (past 72 hours)                     vancomycin (VANCOCIN) 2,500 mg in 0.9% NaCl 525 mL intermittent infusion (mg) 2,500 mg New Bag 25 1724                    Nephrotoxins and other renal medications (From now, onward)      Start     Dose/Rate Route Frequency Ordered Stop    25 0700  vancomycin (VANCOCIN) 1,000 mg in 200 mL dextrose intermittent infusion         1,000 mg  200 mL/hr over 1 Hours Intravenous EVERY 12 HOURS 25 1849      25 2300  piperacillin-tazobactam (ZOSYN) 3.375 g vial to attach to  mL bag        Note to Pharmacy: For [facility], [facility] and [facility]: For Zosyn-naive patients, use the \"Zosyn initial dose + extended infusion\" order panel.    3.375 g  over 240 Minutes Intravenous EVERY 8 HOURS 25 1843      25 1600  vancomycin (VANCOCIN) 2,500 mg in 0.9% NaCl 525 mL intermittent infusion         2,500 mg  over 120 Minutes Intravenous ONCE 25 1552              Contrast Orders - past 72 hours (72h ago, onward)      None            Insight[employer] Prediction of Planned Initial Vancomycin Regimen  Loading dose: 2500 mg IV x1  Regimen: 1000 mg IV every 12 hours.  Start time: 07:00 on 2025  Exposure target: AUC24 (range)400-600 mg/L.hr   AUC24,ss: 515 mg/L.hr  Probability of AUC24 > 400: 68 %  Ctrough,ss: 15 mg/L  Probability of Ctrough,ss > 20: 35 %  Probability of nephrotoxicity (Lodise ARMANDO ): 10 %        Plan:  Start vancomycin 1000 mg IV q12h.   Vancomycin monitoring " method: AUC  Vancomycin therapeutic monitoring goal: 400-600 mg*h/L  Pharmacy will check vancomycin levels as appropriate in 1-3 Days.    Serum creatinine levels will be ordered daily for the first week of therapy and at least twice weekly for subsequent weeks.      Faith BerkowitzD

## 2025-04-02 NOTE — ED NOTES
Patient complains of itching on top of scalp and side of face. Small pink blotches on top of scalp. No breathing or swallowing difficulties noted. Provider updated.

## 2025-04-03 LAB
ALBUMIN UR-MCNC: 30 MG/DL
ANION GAP SERPL CALCULATED.3IONS-SCNC: 8 MMOL/L (ref 7–15)
APPEARANCE UR: ABNORMAL
BACTERIA #/AREA URNS HPF: ABNORMAL /HPF
BACTERIA BLD CULT: NORMAL
BACTERIA BLD CULT: NORMAL
BASOPHILS # BLD AUTO: 0 10E3/UL (ref 0–0.2)
BASOPHILS NFR BLD AUTO: 0 %
BILIRUB UR QL STRIP: NEGATIVE
BUN SERPL-MCNC: 17 MG/DL (ref 6–20)
CALCIUM SERPL-MCNC: 10 MG/DL (ref 8.8–10.4)
CHLORIDE SERPL-SCNC: 99 MMOL/L (ref 98–107)
COLOR UR AUTO: YELLOW
CREAT SERPL-MCNC: 0.83 MG/DL (ref 0.51–0.95)
CRP SERPL-MCNC: 110.8 MG/L
EGFRCR SERPLBLD CKD-EPI 2021: 85 ML/MIN/1.73M2
EOSINOPHIL # BLD AUTO: 0.4 10E3/UL (ref 0–0.7)
EOSINOPHIL NFR BLD AUTO: 4 %
ERYTHROCYTE [DISTWIDTH] IN BLOOD BY AUTOMATED COUNT: 17.5 % (ref 10–15)
GLUCOSE SERPL-MCNC: 101 MG/DL (ref 70–99)
GLUCOSE UR STRIP-MCNC: >1000 MG/DL
HCO3 SERPL-SCNC: 27 MMOL/L (ref 22–29)
HCT VFR BLD AUTO: 42 % (ref 35–47)
HGB BLD-MCNC: 13.4 G/DL (ref 11.7–15.7)
HGB UR QL STRIP: ABNORMAL
IMM GRANULOCYTES # BLD: 0.1 10E3/UL
IMM GRANULOCYTES NFR BLD: 1 %
KETONES UR STRIP-MCNC: NEGATIVE MG/DL
LEUKOCYTE ESTERASE UR QL STRIP: ABNORMAL
LYMPHOCYTES # BLD AUTO: 0.7 10E3/UL (ref 0.8–5.3)
LYMPHOCYTES NFR BLD AUTO: 7 %
MCH RBC QN AUTO: 28.8 PG (ref 26.5–33)
MCHC RBC AUTO-ENTMCNC: 31.9 G/DL (ref 31.5–36.5)
MCV RBC AUTO: 90 FL (ref 78–100)
MONOCYTES # BLD AUTO: 0.5 10E3/UL (ref 0–1.3)
MONOCYTES NFR BLD AUTO: 5 %
NEUTROPHILS # BLD AUTO: 8.7 10E3/UL (ref 1.6–8.3)
NEUTROPHILS NFR BLD AUTO: 84 %
NITRATE UR QL: NEGATIVE
NRBC # BLD AUTO: 0 10E3/UL
NRBC BLD AUTO-RTO: 0 /100
PH UR STRIP: 5.5 [PH] (ref 5–7)
PLATELET # BLD AUTO: 170 10E3/UL (ref 150–450)
POTASSIUM SERPL-SCNC: 3.7 MMOL/L (ref 3.4–5.3)
PROCALCITONIN SERPL IA-MCNC: 0.33 NG/ML
RBC # BLD AUTO: 4.65 10E6/UL (ref 3.8–5.2)
RBC URINE: 20 /HPF
SODIUM SERPL-SCNC: 134 MMOL/L (ref 135–145)
SP GR UR STRIP: 1.02 (ref 1–1.03)
SQUAMOUS EPITHELIAL: 7 /HPF
UROBILINOGEN UR STRIP-MCNC: NORMAL MG/DL
WBC # BLD AUTO: 10.4 10E3/UL (ref 4–11)
WBC URINE: 103 /HPF
YEAST #/AREA URNS HPF: ABNORMAL /HPF
YEAST #/AREA URNS HPF: ABNORMAL /HPF

## 2025-04-03 PROCEDURE — 85004 AUTOMATED DIFF WBC COUNT: CPT | Performed by: HOSPITALIST

## 2025-04-03 PROCEDURE — 999N000157 HC STATISTIC RCP TIME EA 10 MIN

## 2025-04-03 PROCEDURE — 250N000011 HC RX IP 250 OP 636: Performed by: FAMILY MEDICINE

## 2025-04-03 PROCEDURE — 85014 HEMATOCRIT: CPT | Performed by: HOSPITALIST

## 2025-04-03 PROCEDURE — 120N000001 HC R&B MED SURG/OB

## 2025-04-03 PROCEDURE — 36415 COLL VENOUS BLD VENIPUNCTURE: CPT | Performed by: HOSPITALIST

## 2025-04-03 PROCEDURE — 250N000013 HC RX MED GY IP 250 OP 250 PS 637: Performed by: HOSPITALIST

## 2025-04-03 PROCEDURE — G0463 HOSPITAL OUTPT CLINIC VISIT: HCPCS

## 2025-04-03 PROCEDURE — 86140 C-REACTIVE PROTEIN: CPT | Performed by: INTERNAL MEDICINE

## 2025-04-03 PROCEDURE — 999N000147 HC STATISTIC PT IP EVAL DEFER: Performed by: PHYSICAL THERAPIST

## 2025-04-03 PROCEDURE — 84145 PROCALCITONIN (PCT): CPT | Performed by: INTERNAL MEDICINE

## 2025-04-03 PROCEDURE — 94660 CPAP INITIATION&MGMT: CPT

## 2025-04-03 PROCEDURE — 250N000011 HC RX IP 250 OP 636: Performed by: HOSPITALIST

## 2025-04-03 PROCEDURE — 80048 BASIC METABOLIC PNL TOTAL CA: CPT | Performed by: HOSPITALIST

## 2025-04-03 PROCEDURE — 99233 SBSQ HOSP IP/OBS HIGH 50: CPT | Performed by: INTERNAL MEDICINE

## 2025-04-03 RX ORDER — OXYCODONE HYDROCHLORIDE 5 MG/1
5 TABLET ORAL EVERY 4 HOURS PRN
Status: ACTIVE | OUTPATIENT
Start: 2025-04-03

## 2025-04-03 RX ADMIN — APIXABAN 5 MG: 5 TABLET, FILM COATED ORAL at 19:10

## 2025-04-03 RX ADMIN — ASPIRIN 81 MG: 81 TABLET, COATED ORAL at 08:08

## 2025-04-03 RX ADMIN — VANCOMYCIN HYDROCHLORIDE 1000 MG: 1 INJECTION, SOLUTION INTRAVENOUS at 19:52

## 2025-04-03 RX ADMIN — SPIRONOLACTONE 12.5 MG: 25 TABLET, FILM COATED ORAL at 08:09

## 2025-04-03 RX ADMIN — ESCITALOPRAM OXALATE 10 MG: 10 TABLET ORAL at 08:09

## 2025-04-03 RX ADMIN — ACETAMINOPHEN 650 MG: 325 TABLET ORAL at 17:40

## 2025-04-03 RX ADMIN — PIPERACILLIN AND TAZOBACTAM 3.38 G: 3; .375 INJECTION, POWDER, FOR SOLUTION INTRAVENOUS at 16:57

## 2025-04-03 RX ADMIN — VANCOMYCIN HYDROCHLORIDE 1000 MG: 1 INJECTION, SOLUTION INTRAVENOUS at 06:01

## 2025-04-03 RX ADMIN — APIXABAN 5 MG: 5 TABLET, FILM COATED ORAL at 08:08

## 2025-04-03 RX ADMIN — POTASSIUM CHLORIDE 30 MEQ: 1500 TABLET, EXTENDED RELEASE ORAL at 08:08

## 2025-04-03 RX ADMIN — PIPERACILLIN AND TAZOBACTAM 3.38 G: 3; .375 INJECTION, POWDER, FOR SOLUTION INTRAVENOUS at 08:09

## 2025-04-03 RX ADMIN — EMPAGLIFLOZIN 10 MG: 10 TABLET, FILM COATED ORAL at 08:09

## 2025-04-03 RX ADMIN — SIMVASTATIN 10 MG: 10 TABLET, FILM COATED ORAL at 21:26

## 2025-04-03 RX ADMIN — BUMETANIDE 6 MG: 2 TABLET ORAL at 08:08

## 2025-04-03 ASSESSMENT — ACTIVITIES OF DAILY LIVING (ADL)
ADLS_ACUITY_SCORE: 60
ADLS_ACUITY_SCORE: 60
ADLS_ACUITY_SCORE: 58
ADLS_ACUITY_SCORE: 60
ADLS_ACUITY_SCORE: 55
ADLS_ACUITY_SCORE: 60
ADLS_ACUITY_SCORE: 58
ADLS_ACUITY_SCORE: 60
ADLS_ACUITY_SCORE: 58
DEPENDENT_IADLS:: CLEANING;LAUNDRY;TRANSPORTATION
ADLS_ACUITY_SCORE: 55
ADLS_ACUITY_SCORE: 60
ADLS_ACUITY_SCORE: 60
ADLS_ACUITY_SCORE: 58
ADLS_ACUITY_SCORE: 60
ADLS_ACUITY_SCORE: 55
ADLS_ACUITY_SCORE: 60
ADLS_ACUITY_SCORE: 58
ADLS_ACUITY_SCORE: 55
ADLS_ACUITY_SCORE: 58

## 2025-04-03 NOTE — PROGRESS NOTES
[facility] Progress Note - Hospitalist Service    Date of Admission:  4/2/2025    Assessment & Plan   Patient is a 49-year-old female with MS, chronic hypoxic respiratory failure on 3 LPM O2, obesity hypoventilation syndrome, hypertension, multiple sclerosis, homebound, recent LLE DVT, recent hospitalization for recurrent UTIs E. coli sepsis, and chronic BLE lymphedema who presents to the ED with fever, redness and swelling of the left thigh with associated fever.    #Cellulitis, Recurrent, left thigh.  #Recent DVT, LLE  #Chronic lymphemeda  -LLE doppler 3/23/2025: Short segment of vein in deep soft tissues mid left thigh appears noncompressible, corresponds to age-indeterminate occlusive thrombus within L greater saphenous vein.  -BCx, WOC, lymphedema therapy  -Continue Zosyn  - MRSA swab negative.  -Continue PTA Eliquis    #Probable UTI.  UA turbid, with positive leukoesterase, pyuria, negative nitrates.  - Recent hospitalization for E. coli sepsis due to cystitis.  Blood cultures were negative.  - On Zosyn as above.  History of Enterococcus bacteremia.    #Chronic respiratory failure with hypoxia 3 LPM at home.  #Obesity hypoventilation syndrome.  #Pulmonary hypertension  #Chronic HFpEF  -Continue PTA ASA, bumex, spironolactone, Jardiance, statin  - On 3 LPM, which is her baseline    #Diastolic CHF, chronic.  #MDD-on Lexapro.  #HLD-on simvastatin.    - Continue PTA Bumex diet: Regular Diet Adult    DVT Prophylaxis: DOAC  Brar Catheter: Not present  Lines: None     Cardiac Monitoring: None  Code Status: Full Code      Clinically Significant Risk Factors Present on Admission        # Hyponatremia: Lowest Na = 134 mmol/L in last 2 days, will monitor as appropriate  # Hypochloremia: Lowest Cl = 97 mmol/L in last 2 days, will monitor as appropriate       # Drug Induced Coagulation Defect: home medication list includes an anticoagulant medication  # Drug Induced Platelet Defect:  "home medication list includes an antiplatelet medication   # Hypertension: Noted on problem list  # Chronic heart failure with preserved ejection fraction: heart failure noted on problem list and last echo with EF >50%  # Severe Obesity: Estimated body mass index is 43.6 kg/m  as calculated from the following:    Height as of this encounter: 1.626 m (5' 4\").    Weight as of this encounter: 115.2 kg (254 lb).       # Financial/Environmental Concerns:      Social Drivers of Health    Food Insecurity: High Risk (3/23/2025)    Food Insecurity     Within the past 12 months, did you worry that your food would run out before you got money to buy more?: Yes     Within the past 12 months, did the food you bought just not last and you didn t have money to get more?: No   Transportation Needs: High Risk (3/23/2025)    Transportation Needs     Within the past 12 months, has lack of transportation kept you from medical appointments, getting your medicines, non-medical meetings or appointments, work, or from getting things that you need?: Yes   Physical Activity: Inactive (3/21/2024)    Exercise Vital Sign     Days of Exercise per Week: 0 days     Minutes of Exercise per Session: 0 min   Interpersonal Safety: High Risk (3/23/2025)    Interpersonal Safety     Do you feel physically and emotionally safe where you currently live?: No     Within the past 12 months, have you been hit, slapped, kicked or otherwise physically hurt by someone?: No     Within the past 12 months, have you been humiliated or emotionally abused in other ways by your partner or ex-partner?: No   Social Connections: Unknown (3/1/2022)    Social Connection and Isolation Panel [NHANES]     Frequency of Communication with Friends and Family: Twice a week     Frequency of Social Gatherings with Friends and Family: Twice a week     Disposition Plan     Medically Ready for Discharge: Anticipated in 2-4 Days    Ligia James MD  Hospitalist Service  [facility] " [facility]  Securely message with [employer] (more info)  Text page via [employer] Paging/Directory   ______________________________________________________________________    Interval History   Patient is new to me.  Chart reviewed.  Patient was seen and examined.  Day #1 of hospitalization for left leg cellulitis.  No recurrence of fever.  Feeling better since starting antibiotics.  No dysuria.  Patient denies chest pain, cough, cardiac operations.  O2 requirements at her baseline of 3 LPM.    Physical Exam   Vital Signs: Temp: 98.6  F (37  C) Temp src: Oral BP: 108/64 Pulse: 87   Resp: 24 SpO2: (!) 91 % O2 Device: Nasal cannula Oxygen Delivery: 5 LPM  Weight: 254 lbs 0 oz  General: Alert and oriented x 3. Not in obvious distress.  HEENT: NC, AT. Neck- supple, No JVP elevation, lymphadenopathy or thyromegaly. Trachea-central.  Chest: Clear to auscultation bilaterally.  Heart: S1S2 regular. No M/R/G.  Abdomen: Soft. NT, ND. No organomegaly. Bowel sounds- active.  Back: No spine tenderness. No CVA tenderness.  Extremities: Bilateral leg lymphedema, more prominent on the left.  Wearing compression wrap on the left.  Some skin warmth on the left inner thigh.  Neuro: Cranial nerves 1-12 grossly normal. No focal neurological deficit    Medical Decision Making       51 MINUTES SPENT BY ME on the date of service doing chart review, history, exam, documentation & further activities per the note.      Data     I have personally reviewed the following data over the past 24 hrs:    10.4  \   13.4   / 170     134 (L) 99 17.0 /  101 (H)   3.7 27 0.83 \     ALT: 11 AST: 20 AP: 74 TBILI: 3.0 (H)   ALB: 3.6 TOT PROTEIN: 7.6 LIPASE: N/A     Procal: 0.22 CRP: N/A Lactic Acid: 2.0      Imaging results reviewed over the past 24 hrs:   No results found for this or any previous visit (from the past 24 hours).    This document is created with the help of Dragon dictation system. All grammatical errors are unintentional.

## 2025-04-03 NOTE — H&P
"[facility]    History and Physical - Hospitalist Service       Date of Admission:  4/2/2025    Assessment & Plan    Patient is a 49-year-old female with MS, chronic respiratory failure with home O2, recent LLE DVT and chronic BLE lymphedema who presents to the ED with fever, redness and swelling of the left thigh.    #Cellulitis, left thigh  #Recent DVT, LLE  #Chronic lymphemeda  LLE doppler 3/23/2025: Short segment of vein in deep soft tissues mid left thigh appears noncompressible, corresponds to age-indeterminate occlusive thrombus within L greater saphenous vein.  BCx, WOC, lymphedema therapy  Empiric Zosyn, Vanc, check MRSA swab (neg 2 yrs ago)  PTA Eliquis    #Chronic respiratory failure with hypoxia  #Pulmonary hypertension  #Chronic HFpEF  PTA ASA, bumex, spironolactone, Jardiance, statin          Diet: Regular Diet Adult    DVT Prophylaxis: DOAC  Brar Catheter: Not present  Lines: None     Cardiac Monitoring: None  Code Status: Full Code      Clinically Significant Risk Factors Present on Admission          # Hypochloremia: Lowest Cl = 97 mmol/L in last 2 days, will monitor as appropriate       # Drug Induced Coagulation Defect: home medication list includes an anticoagulant medication  # Drug Induced Platelet Defect: home medication list includes an antiplatelet medication   # Hypertension: Noted on problem list  # Chronic heart failure with preserved ejection fraction: heart failure noted on problem list and last echo with EF >50%          # Severe Obesity: Estimated body mass index is 43.6 kg/m  as calculated from the following:    Height as of this encounter: 1.626 m (5' 4\").    Weight as of this encounter: 115.2 kg (254 lb).       # Financial/Environmental Concerns:           Disposition Plan     Medically Ready for Discharge: Anticipated in 2-4 Days           Michael Cabrera DO  Hospitalist Service  [facility]  Securely message with [employer] (more " info)  Text page via [facility] Paging/Directory     ______________________________________________________________________    Chief Complaint   Fever, L thigh redness, swelling    History is obtained from the patient, electronic health record, and emergency department physician    History of Present Illness   Patient is a 49-year-old female with MS, chronic respiratory failure with home O2, recent left thigh DVT and chronic BLE lymphedema who presents to the ED with fever and worsening redness and swelling in the left thigh.  Patient has felt generally weak and temperature up to 100.9 in the ED.  No shaking chills, nausea, vomiting, chest pain, shortness of breath or syncope.      Past Medical History    Past Medical History:   Diagnosis Date    Acute on chronic diastolic congestive heart failure (H) 02/09/2022    Arthritis 2019    Hips    ASCUS favor benign 08/2015    Neg HPV    Deep vein thrombosis (DVT) of left lower extremity (H) 03/25/2025    Depressive disorder 2010    H/O colposcopy with cervical biopsy 09/14/2015    Bx & ECC - negative    Hypertension 2019    LSIL on Pap smear 07/2014    Neg high risk HPV    Multiple sclerosis (H) 08/29/2005 9/18/200pt dx with MS 2004--dx by neurolgist and is followed by Dr. Harris    Myocardial infarction (H)     Obese     Pneumonia due to infectious organism, unspecified laterality, unspecified part of lung 02/08/2022    Sepsis (Temp 101, , WBC 13.0) 10/23/2018    Shingles 10/2016    SIRS (systemic inflammatory response syndrome) (H) 03/04/2021    Sleep apnea     UNSPEC CONSTIPATION 09/18/2006 9/18/2006   Has tried otc suppository and otc lax.        Past Surgical History   Past Surgical History:   Procedure Laterality Date    CHOLECYSTECTOMY, LAPOROSCOPIC      Cholecystectomy, Laparoscopic    COLONOSCOPY  2007?    Bathroom issue..results normal    COMBINED CYSTOSCOPY, RETROGRADES, EXCHANGE STENT URETER(S) Right 2/21/2022    Procedure: CYSTOSCOPY, WITH right  RETROGRADE PYELOGRAM AND right URETERAL STENT PLACEMENT;  Surgeon: Doyle Palomares MD;  Location: [facility] OR    OPEN REDUCTION INTERNAL FIXATION TOE(S)  4/13/2012    Procedure:OPEN REDUCTION INTERNAL FIXATION TOE(S); Open reduction internal fixation right proximal fifth metatarsal fracture-   Anes-choice block; Surgeon:LEY, JEFFREY DUANE; Location:[facility] OR    PICC SINGLE LUMEN PLACEMENT  3/20/2024    PICC TRIPLE LUMEN PLACEMENT  2/21/2022            Prior to Admission Medications   Prior to Admission Medications   Prescriptions Last Dose Informant Patient Reported? Taking?   Emollient (GOLD BOND MEDICATED BODY EX)  Self Yes Yes   Sig: Externally apply 1 Application topically 2 times daily as needed   acetaminophen (TYLENOL) 650 MG CR tablet Past Month  Yes Yes   Sig: Take 1,300 mg by mouth every 8 hours as needed for mild pain or fever.   apixaban ANTICOAGULANT (ELIQUIS) 5 MG tablet 4/2/2025 Morning  No Yes   Sig: Take 1 tablet (5 mg) by mouth 2 times daily.   aspirin 81 MG EC tablet 4/2/2025 Morning  Yes Yes   Sig: Take 81 mg by mouth every morning.   bumetanide (BUMEX) 2 MG tablet 4/1/2025 Morning  Yes Yes   Sig: Take 6 mg by mouth every morning.   empagliflozin (JARDIANCE) 10 MG TABS tablet 4/2/2025 Morning  Yes Yes   Sig: Take 10 mg by mouth every morning.   escitalopram (LEXAPRO) 10 MG tablet 4/2/2025 Morning  Yes Yes   Sig: Take 10 mg by mouth every morning.   miconazole (MICATIN) 2 % external powder   Yes Yes   Sig: Apply topically 2 times daily as needed for itching or other.   potassium chloride sheila ER (KLOR-CON M10) 10 MEQ CR tablet 4/2/2025 Morning  Yes Yes   Sig: Take 30 mEq by mouth every morning.   sennosides (SENOKOT) 8.6 MG tablet   No Yes   Sig: Take 1 tablet by mouth 2 times daily as needed for constipation.   simvastatin (ZOCOR) 10 MG tablet 4/1/2025 Bedtime  No Yes   Sig: Take 1 tablet (10 mg) by mouth at bedtime.   spironolactone (ALDACTONE) 25 MG tablet 4/1/2025 Morning  Yes Yes   Sig:  Take 12.5 mg by mouth every morning.      Facility-Administered Medications: None           Physical Exam   Vital Signs: Temp: 100.4  F (38  C) Temp src: Oral BP: 110/56 Pulse: 100   Resp: 20 SpO2: 95 % O2 Device: Nasal cannula Oxygen Delivery: 3 LPM  Weight: 254 lbs 0 oz    General Appearance:  No acute distress  Respiratory: Clear to auscultation bilaterally  Cardiovascular: Regular rate and rhythm  GI: Normal bowel sounds, abdomen is soft with no rebound  Extremities: Bilateral lymphedema with chronic blistering, L medial thigh has area of demarcated area of redness and swelling with warmth  Neuro: Alert and oriented x 3, normal speech      Medical Decision Making             Data

## 2025-04-03 NOTE — PLAN OF CARE
Problem: Adult Inpatient Plan of Care  Goal: Plan of Care Review  Description: The Plan of Care Review/Shift note should be completed every shift.  The Outcome Evaluation is a brief statement about your assessment that the patient is improving, declining, or no change.  This information will be displayed automatically on your shiftnote.  Outcome: Progressing   Goal Outcome Evaluation:    Patient is alert and oriented x4, vitally stable. Baseline 3L of O2 via NC, CPAP at night. Denies pain. IV abx administered. Female external cath in place, adequate UO. Able to make needs known. Call light within reach.

## 2025-04-03 NOTE — PROGRESS NOTES
Care Management Initial Consult    General Information  Assessment completed with: Patient, pt  Type of CM/SW Visit: Initial Assessment    Primary Care Provider verified and updated as needed: Yes   Readmission within the last 30 days: current reason for admission unrelated to previous admission   Return Category: Progression of disease  Reason for Consult: discharge planning  Advance Care Planning: Advance Care Planning Reviewed: no concerns identified     General Information Comments: lives alone    Communication Assessment  Patient's communication style: spoken language (English or Bilingual)             Cognitive  Cognitive/Neuro/Behavioral: WDL                      Living Environment:   People in home: alone     Current living Arrangements: town home      Able to return to prior arrangements: yes       Family/Social Support:  Care provided by: self, homecare agency ([facility] RN/PT)  Provides care for: no one  Marital Status: Single  Support system: Sibling(s)          Description of Support System: Supportive    Support Assessment: Adequate family and caregiver support, Adequate social supports    Current Resources:   Patient receiving home care services: Yes  Skilled Home Care Services: Skilled Nursing, Physical Therapy ([facility])     Community Resources: Home Care  Equipment currently used at home: wheelchair, manual, walker, rolling  Supplies currently used at home: Incontinence Supplies, Oxygen Tubing/Supplies, Other    Employment/Financial:  Employment Status: disabled        Financial Concerns: none   Referral to Financial Worker: No       Does the patient's insurance plan have a 3 day qualifying hospital stay waiver?  No    Lifestyle & Psychosocial Needs:  Social Drivers of Health     Food Insecurity: High Risk (3/23/2025)    Food Insecurity     Within the past 12 months, did you worry that your food would run out before you got money to buy more?: Yes     Within the past 12 months, did  the food you bought just not last and you didn t have money to get more?: No   Depression: Not at risk (4/1/2025)    PHQ-2     PHQ-2 Score: 2   Housing Stability: Low Risk  (3/23/2025)    Housing Stability     Do you have housing? : Yes     Are you worried about losing your housing?: No   Tobacco Use: Low Risk  (4/2/2025)    Patient History     Smoking Tobacco Use: Never     Smokeless Tobacco Use: Never     Passive Exposure: Not on file   Financial Resource Strain: Low Risk  (3/23/2025)    Financial Resource Strain     Within the past 12 months, have you or your family members you live with been unable to get utilities (heat, electricity) when it was really needed?: No   Alcohol Use: Not on file   Transportation Needs: High Risk (3/23/2025)    Transportation Needs     Within the past 12 months, has lack of transportation kept you from medical appointments, getting your medicines, non-medical meetings or appointments, work, or from getting things that you need?: Yes   Physical Activity: Inactive (3/21/2024)    Exercise Vital Sign     Days of Exercise per Week: 0 days     Minutes of Exercise per Session: 0 min   Interpersonal Safety: High Risk (3/23/2025)    Interpersonal Safety     Do you feel physically and emotionally safe where you currently live?: No     Within the past 12 months, have you been hit, slapped, kicked or otherwise physically hurt by someone?: No     Within the past 12 months, have you been humiliated or emotionally abused in other ways by your partner or ex-partner?: No   Stress: No Stress Concern Present (12/4/2020)    [employer] [place] [employer] - Occupational Stress Questionnaire     Feeling of Stress : Only a little   Social Connections: Unknown (3/1/2022)    Social Connection and Isolation Panel [NHANES]     Frequency of Communication with Friends and Family: Twice a week     Frequency of Social Gatherings with Friends and Family: Twice a week     Attends Yazidism Services: Not on  file     Active Member of Clubs or Organizations: Not on file     Attends Club or Organization Meetings: Not on file     Marital Status: Not on file   Health Literacy: Not on file       Functional Status:  Prior to admission patient needed assistance:   Dependent ADLs:: Ambulation-walker, Wheelchair-independent  Dependent IADLs:: Cleaning, Laundry, Transportation  Assesssment of Functional Status: Not at baseline with ADL Functioning    Mental Health Status:  Mental Health Status: No Current Concerns       Chemical Dependency Status:                Values/Beliefs:  Spiritual, Cultural Beliefs, Restorationist Practices, Values that affect care:                 Discussed  Partnership in Safe Discharge Planning  document with patient/family: No    Additional Information:  Lives alone and has [facility] w/RN/PT svcs and on 3L continuous home O2. Will need WC transport and discussed cost. States that she know her insurance will not cover ride.    Next Steps: medical stability and recommendations, CM to set up WC ride w/oxygen and resume homecare.    Charo Flower RN

## 2025-04-03 NOTE — PLAN OF CARE
Problem: Pain Acute  Goal: Optimal Pain Control and Function  Outcome: Progressing     Problem: Heart Failure  Goal: Optimal Cardiac Output  4/3/2025 1756 by Vin Hernández RN  Outcome: Progressing  4/3/2025 1756 by Vin Hernández RN  Outcome: Progressing  Goal: Effective Oxygenation and Ventilation  4/3/2025 1756 by Vin Hernández RN  Outcome: Progressing  4/3/2025 1756 by Vin Hernández RN  Outcome: Progressing  Intervention: Promote Airway Secretion Clearance  Recent Flowsheet Documentation  Taken 4/3/2025 0800 by Vin Hernández RN  Activity Management: activity adjusted per tolerance  Intervention: Optimize Oxygenation and Ventilation  Recent Flowsheet Documentation  Taken 4/3/2025 0800 by Vin Hernández RN  Head of Bed (HOB) Positioning: HOB at 30 degrees     Problem: Infection  Goal: Absence of Infection Signs and Symptoms  Outcome: Progressing   Goal Outcome Evaluation:       Pt AxOx4 on 3L to 5L NC. Severe BLE edema. LLE with deborah skin and inner thigh wound, wound care done, mepilex in place. Purewick in place with good UOP. Pt with a temp of 100.1 and 4/10 pain after turning in bed from full linen change, PRN Tylenol given x 1 and PRN oxy ordered in case pain increases. On IV abx.

## 2025-04-03 NOTE — PROGRESS NOTES
Lymphedema Therapy: Orders received. Chart reviewed and discussed with care team.? Patient has extensive lymphedema needs. Patient requires aggressive outpatient lymphedema to address. Pt reports inconsistent availability of transportation to meet outpatient lymphedema needs. Pt's needs unable to be addressed acutely unless able to have consistent continued lymphedema therapy follow-up. Will defer inpatient lymphedema therapy follow-up.?Will complete orders.     Thelma Peñaloza, PT  4/3/2025

## 2025-04-03 NOTE — PROGRESS NOTES
Writer received report for this pt at 2140. Pt arrived to the new room around 2150. Pt alert and oriented times 4. Vitals stable. Denied any pain. She was on 3 L NC but later she was put on cpap for night. Pt's alexander area very red and denuded. Applied mepilex on coccyx. Left thigh red and swollen. Cleaned and applied new purewick. Pt has IV abx running at this time. All cares explained to pt.

## 2025-04-03 NOTE — DISCHARGE INSTRUCTIONS
WOC DISCHARGE INSTRUCTIONS:  Left posterior thigh skin fold:  1. Soak wound and alexander skin with Vashe moistened gauze for 5 minutes, dry gently  2. Cover with Mepilex   Change daily

## 2025-04-03 NOTE — CONSULTS
[facility]  WOC Nurse Inpatient Assessment     Consulted for: L upper medial/posterior thigh    Summary: Patient need lymphedema consult    WOC nurse follow-up plan: weekly    Patient History (according to provider note(s):      Per H+P:  49-year-old female with MS, chronic respiratory failure with home O2, recent left thigh DVT and chronic BLE lymphedema who presents to the ED with fever and worsening redness and swelling in the left thigh.  Patient has felt generally weak and temperature up to 100.9 in the ED.  No shaking chills, nausea, vomiting, chest pain, shortness of breath or syncope.    Assessment:      Areas visualized during today's visit:  posterior left thigh    Wound location: posterior left thigh fold    Last photo: 4/3  Wound due to:  Lymphedema  Wound history/plan of care: Patient states she has had this for days. Patient has had issues keeping lymphedema wraps in place in the past, agrees to a consult.  Wound base: Multiple open areas with fold 4x6cm area     Palpation of the wound bed: normal      Drainage: scant     Description of drainage: bloody     Tunneling: N/A     Undermining: N/A  Periwound skin: Lymphedema      Color: normal and consistent with surrounding tissue      Temperature: normal   Odor: none  Pain: mild  Pain interventions prior to dressing change: slow and gentle cares   Treatment goal: Heal   STATUS: initial assessment  Supplies ordered: ordered Vashe       Treatment Plan:     Left posterior thigh skin fold:  1. Soak wound and alexander skin with Vashe moistened gauze for 5 minutes, dry gently  2. Cover with Mepilex   Change daily        Orders: Written    RECOMMEND PRIMARY TEAM ORDER: None, at this time  Education provided: plan of care  Discussed plan of care with: Patient and Nurse  Notify WOC if wound(s) deteriorate.  Nursing to notify the Provider(s) and re-consult the WOC Nurse if new skin concern.    DATA:     Current support surface: Standard  ED  cart  Containment of urine/stool: Continent of bladder and Continent of bowel  BMI: Body mass index is 43.6 kg/m .   Active diet order: Orders Placed This Encounter      Regular Diet Adult     Output: I/O last 3 completed shifts:  In: 1340 [P.O.:240; IV Piggyback:1100]  Out: 3250 [Urine:3250]     Labs:   Recent Labs   Lab 04/03/25  0657 04/02/25  1627   ALBUMIN  --  3.6   HGB 13.4 14.4   WBC 10.4 13.6*     Pressure injury risk assessment:   Sensory Perception: 3-->slightly limited  Moisture: 3-->occasionally moist  Activity: 3-->walks occasionally  Mobility: 2-->very limited  Nutrition: 3-->adequate  Friction and Shear: 1-->problem  Ben Score: 15    Barbara Logan, MARTHAN, RN, PHN, HNB-BC, CWOCN  Pager no longer is use, please contact through [employer] group: [facility] [employer] [facility]

## 2025-04-04 ENCOUNTER — APPOINTMENT (OUTPATIENT)
Dept: OCCUPATIONAL THERAPY | Facility: HOSPITAL | Age: 50
DRG: 603 | End: 2025-04-04
Attending: INTERNAL MEDICINE
Payer: COMMERCIAL

## 2025-04-04 ENCOUNTER — APPOINTMENT (OUTPATIENT)
Dept: PHYSICAL THERAPY | Facility: HOSPITAL | Age: 50
DRG: 603 | End: 2025-04-04
Attending: INTERNAL MEDICINE
Payer: COMMERCIAL

## 2025-04-04 VITALS
HEART RATE: 84 BPM | BODY MASS INDEX: 46.45 KG/M2 | SYSTOLIC BLOOD PRESSURE: 96 MMHG | OXYGEN SATURATION: 95 % | WEIGHT: 272.05 LBS | HEIGHT: 64 IN | TEMPERATURE: 98.7 F | DIASTOLIC BLOOD PRESSURE: 54 MMHG | RESPIRATION RATE: 20 BRPM

## 2025-04-04 LAB
BACTERIA UR CULT: NORMAL
CREAT SERPL-MCNC: 0.94 MG/DL (ref 0.51–0.95)
EGFRCR SERPLBLD CKD-EPI 2021: 74 ML/MIN/1.73M2
HOLD SPECIMEN: NORMAL
VANCOMYCIN SERPL-MCNC: 17.1 UG/ML

## 2025-04-04 PROCEDURE — 80202 ASSAY OF VANCOMYCIN: CPT | Performed by: INTERNAL MEDICINE

## 2025-04-04 PROCEDURE — 97162 PT EVAL MOD COMPLEX 30 MIN: CPT | Mod: GP

## 2025-04-04 PROCEDURE — 250N000011 HC RX IP 250 OP 636: Performed by: FAMILY MEDICINE

## 2025-04-04 PROCEDURE — 97530 THERAPEUTIC ACTIVITIES: CPT | Mod: GP

## 2025-04-04 PROCEDURE — 97116 GAIT TRAINING THERAPY: CPT | Mod: GP

## 2025-04-04 PROCEDURE — 999N000157 HC STATISTIC RCP TIME EA 10 MIN

## 2025-04-04 PROCEDURE — 120N000001 HC R&B MED SURG/OB

## 2025-04-04 PROCEDURE — 82565 ASSAY OF CREATININE: CPT | Performed by: FAMILY MEDICINE

## 2025-04-04 PROCEDURE — 99233 SBSQ HOSP IP/OBS HIGH 50: CPT | Performed by: INTERNAL MEDICINE

## 2025-04-04 PROCEDURE — 36415 COLL VENOUS BLD VENIPUNCTURE: CPT | Performed by: FAMILY MEDICINE

## 2025-04-04 PROCEDURE — 97166 OT EVAL MOD COMPLEX 45 MIN: CPT | Mod: GO

## 2025-04-04 PROCEDURE — 250N000011 HC RX IP 250 OP 636: Performed by: HOSPITALIST

## 2025-04-04 PROCEDURE — 94660 CPAP INITIATION&MGMT: CPT

## 2025-04-04 PROCEDURE — 97535 SELF CARE MNGMENT TRAINING: CPT | Mod: GO

## 2025-04-04 PROCEDURE — 250N000013 HC RX MED GY IP 250 OP 250 PS 637: Performed by: HOSPITALIST

## 2025-04-04 RX ADMIN — POTASSIUM CHLORIDE 30 MEQ: 1500 TABLET, EXTENDED RELEASE ORAL at 09:21

## 2025-04-04 RX ADMIN — ESCITALOPRAM OXALATE 10 MG: 10 TABLET ORAL at 09:20

## 2025-04-04 RX ADMIN — VANCOMYCIN HYDROCHLORIDE 1000 MG: 1 INJECTION, SOLUTION INTRAVENOUS at 05:46

## 2025-04-04 RX ADMIN — APIXABAN 5 MG: 5 TABLET, FILM COATED ORAL at 09:21

## 2025-04-04 RX ADMIN — ASPIRIN 81 MG: 81 TABLET, COATED ORAL at 09:19

## 2025-04-04 RX ADMIN — APIXABAN 5 MG: 5 TABLET, FILM COATED ORAL at 19:34

## 2025-04-04 RX ADMIN — EMPAGLIFLOZIN 10 MG: 10 TABLET, FILM COATED ORAL at 09:20

## 2025-04-04 RX ADMIN — PIPERACILLIN AND TAZOBACTAM 3.38 G: 3; .375 INJECTION, POWDER, FOR SOLUTION INTRAVENOUS at 00:18

## 2025-04-04 RX ADMIN — PIPERACILLIN AND TAZOBACTAM 3.38 G: 3; .375 INJECTION, POWDER, FOR SOLUTION INTRAVENOUS at 09:23

## 2025-04-04 RX ADMIN — SIMVASTATIN 10 MG: 10 TABLET, FILM COATED ORAL at 21:13

## 2025-04-04 RX ADMIN — MICONAZOLE NITRATE ANTIFUNGAL POWDER: 2 POWDER TOPICAL at 21:13

## 2025-04-04 RX ADMIN — PIPERACILLIN AND TAZOBACTAM 3.38 G: 3; .375 INJECTION, POWDER, FOR SOLUTION INTRAVENOUS at 16:27

## 2025-04-04 ASSESSMENT — ACTIVITIES OF DAILY LIVING (ADL)
ADLS_ACUITY_SCORE: 55
ADLS_ACUITY_SCORE: 59
ADLS_ACUITY_SCORE: 55
ADLS_ACUITY_SCORE: 55
ADLS_ACUITY_SCORE: 59
ADLS_ACUITY_SCORE: 55
ADLS_ACUITY_SCORE: 59
ADLS_ACUITY_SCORE: 59
ADLS_ACUITY_SCORE: 55
ADLS_ACUITY_SCORE: 59
ADLS_ACUITY_SCORE: 55
ADLS_ACUITY_SCORE: 59
ADLS_ACUITY_SCORE: 55
ADLS_ACUITY_SCORE: 55
ADLS_ACUITY_SCORE: 54

## 2025-04-04 NOTE — PROGRESS NOTES
"PT Evaluation   04/04/25 1100   Appointment Info   Signing Clinician's Name / Credentials (PT) Cris Toro PT, DPT   Living Environment   People in Home alone   Current Living Arrangements house   Home Accessibility other (see comments)  (ramped entrance)   Transportation Anticipated health plan transportation   Self-Care   Usual Activity Tolerance moderate   Current Activity Tolerance fair   Equipment Currently Used at Home wheelchair, manual;walker, rolling;grab bar, tub/shower;other (see comments)  (reacher)   Fall history within last six months yes   Number of times patient has fallen within last six months 1  (per chart.)   Activity/Exercise/Self-Care Comment Overall IND with ADLs. Patient has home care to assist with bathing, PT 1x/wk, RN for wounds (not wraps). At baseline ambs maximum 20-25 ft with walker, otherwise uses manual WC for mobility. Sleeps in recliner.   General Information   Onset of Illness/Injury or Date of Surgery 04/02/25   Referring Physician Ligia James MD   Patient/Family Therapy Goals Statement (PT) None stated   Pertinent History of Current Problem (include personal factors and/or comorbidities that impact the POC) Per H&P: \"49-year-old female with MS, chronic hypoxic respiratory failure on 3 LPM O2, obesity hypoventilation syndrome, hypertension, multiple sclerosis, homebound, recent LLE DVT, recent hospitalization for recurrent UTIs E. coli sepsis, and chronic BLE lymphedema who presents to the ED with fever, redness and swelling of the left thigh with associated fever.\"   Existing Precautions/Restrictions fall;oxygen therapy device and L/min  (At 5LPM NC when PT arrived. At home baseline is 3LPM NC per chart.)   Cognition   Affect/Mental Status (Cognition) WNL   Follows Commands (Cognition) WNL   Pain Assessment   Patient Currently in Pain Yes, see Vital Sign flowsheet  (pain in L thigh with movement of LLE)   Integumentary/Edema   Integumentary/Edema other (describe) "   Integumentary/Edema Comments significant lymphedema in BLEs. Has wrap from home on LLE only. States she is unsure if family could bring her the RLE wrap.   Posture    Posture Forward head position   Range of Motion (ROM)   Range of Motion ROM deficits secondary to swelling;ROM deficits secondary to weakness   Strength (Manual Muscle Testing)   Strength (Manual Muscle Testing) Deficits observed during functional mobility   Bed Mobility   Bed Mobility supine-sit   Supine-Sit [place] (Bed Mobility) minimum assist (75% patient effort)  (for movement of LLE only.)   Bed Mobility Limitations decreased ability to use legs for bridging/pushing   Impairments Contributing to Impaired Bed Mobility pain;decreased strength   Assistive Device (Bed Mobility) bed rails   Transfers   Transfers sit-stand transfer   Transfer Safety Concerns Noted decreased weight-shifting ability;decreased step length   Impairments Contributing to Impaired Transfers decreased strength;pain;impaired balance   Sit-Stand Transfer   Sit-Stand [place] (Transfers) contact guard   Assistive Device (Sit-Stand Transfers) walker, front-wheeled   Gait/Stairs (Locomotion)   [place] Level (Gait) minimum assist (75% patient effort)   Assistive Device (Gait) walker, front-wheeled   Distance in Feet (Gait) 3'   Pattern (Gait) step-to   Deviations/Abnormal Patterns (Gait) antalgic;leela decreased;gait speed decreased;stride length decreased;weight shifting decreased   Comment, (Gait/Stairs) Jony for keeping stability of FWW, patient with some difficulty advancing LLE   Balance   Balance other (describe)   Balance Comments cga for sit/stand FWW, CGA-Jony for amb with FWW   Sensory Examination   Sensory Perception patient reports no sensory changes   Sensory Perception Comments At baseline reports N/T in BLEs, not new   Clinical Impression   Criteria for Skilled Therapeutic Intervention Yes, treatment indicated   PT Diagnosis (PT) Impaired functional  mobility   Influenced by the following impairments Impaired strength, balance, activity tolerance; pain   Functional limitations due to impairments Bed mobility, transfers, gait   Clinical Presentation (PT Evaluation Complexity) evolving   Clinical Presentation Rationale Clinical judgment   Clinical Decision Making (Complexity) moderate complexity   Planned Therapy Interventions (PT) balance training;bed mobility training;gait training;home exercise program;neuromuscular re-education;patient/family education;strengthening;stretching;transfer training;progressive activity/exercise;home program guidelines   Risk & Benefits of therapy have been explained evaluation/treatment results reviewed;participants included;patient;participants voiced agreement with care plan   PT Total Evaluation Time   PT Eval, Moderate Complexity Minutes (34013) 10   Physical Therapy Goals   PT Frequency Daily   PT Predicted Duration/Target Date for Goal Attainment 04/11/25   PT Goals Transfers;Gait;Wheelchair Mobility;Bed Mobility   PT: Bed Mobility Supervision/stand-by assist;Supine to/from sit   PT: Transfers Supervision/stand-by assist;Bed to/from chair;Sit to/from stand;Assistive device   PT: Gait Supervision/stand-by assist;Assistive device;Rolling walker;25 feet   PT: Wheelchair Mobility 50 feet;Caregiver SBA;manual wheelchair   Interventions   Interventions Quick Adds Therapeutic Activity;Gait Training   Therapeutic Activity   Therapeutic Activities: dynamic activities to improve functional performance Minutes (27825) 10   Symptoms Noted During/After Treatment Fatigue   Treatment Detail/Skilled Intervention eval completed, tx initiated. extended time for management of lines. additional sit/stands from eob/recliner<>FWW CGA, patient demos good technique with use of BUEs on armrests. Facilitated AROM seated ankle pf/df & B knee ext with LLE requiring maxA for AAROM. advised pt to perform ankle pf/df throughout day for circulation. ModA  brief management. Reviewed use of call light to have assistance for mobility for safety. Patient left sitting up in recliner w call light & all other needs in reach, chair alarm engaged.   Gait Training   Gait Training Minutes (48365) 10   Symptoms Noted During/After Treatment (Gait Training) fatigue;increased pain   Treatment Detail/Skilled Intervention with FWW CGA & intermittent Jony for maintaining stability of FWW, cues for walker proximity, PT managing lines. began with marching in place for pre-gait, cues for WS. Prog to forward and retro stepping with FWW, limited by O2 line at this time. Pt with moderate difficulty advancing LLE, drags with amb.   Distance in Feet 2x4'   [place] Level (Gait Training) minimum assist (75% patient effort)   Physical Assistance Level (Gait Training) verbal cues;1 person assist   Assistive Device (Gait Training) rolling walker  (FWW)   Gait Analysis Deviations decreased leela;decreased step length;increased time in double stance;decreased toe-to-floor clearance;decreased weight-shifting ability   Impairments (Gait Analysis/Training) balance impaired;pain;strength decreased   PT Discharge Planning   PT Plan Bring O2 tank: prog bed mob, transf & gait FWW w chair follow.   PT Discharge Recommendation (DC Rec) home with assist;home with home care physical therapy   PT Rationale for DC Rec Patient overall appearing to be mobilizing near baseline, CGA for transfers & short distance of gait with FWW. Pt does require Jony for movement of LLE with bed mobility however sleeps in recliner at home. Anticipate once medically ready she will be safe to go home with previous levels of assist and home PT to progress functional mobility.   PT Brief overview of current status supine>sit Jony for LLE; cga sit/stand FWW; cga-Jony gait FWW   PT Total Distance Amb During Session (feet) 11   PT Equipment Needed at Discharge wheelchair;walker, rolling  (owns both at home.)   Physical Therapy Time  and Intention   Timed Code Treatment Minutes 20   Total Session Time (sum of timed and untimed services) 30

## 2025-04-04 NOTE — PROGRESS NOTES
Occupational Therapy      04/04/25 1400   Appointment Info   Signing Clinician's Name / Credentials (OT) Amanda Obregon, OTR/L   Living Environment   People in Home alone   Current Living Arrangements house   Home Accessibility other (see comments)  (ramped entrance)   Living Environment Comments Patient has home care to assist with bathing, PT 1x/wk and RN for wounds   Self-Care   Equipment Currently Used at Home commode chair;grab bar, toilet;grab bar, tub/shower;shower chair;walker, rolling;wheelchair, manual;other (see comments)  (4WW, 3L home oxygen)   Fall history within last six months yes   Number of times patient has fallen within last six months 1   Activity/Exercise/Self-Care Comment Patient has been home ~1 month from TCU, prior to that patient was hospitalized following a fall. Patient ambulates short distance with FWW (~20 feet) otherwise uses WC for mobility. Patient sleeps in recliner. Other than bathing, patient she is independent with ADLs. However, patient has had multiple admissions/TCU this year   General Information   Onset of Illness/Injury or Date of Surgery 04/02/25   Referring Physician Ligia James MD   Patient/Family Therapy Goal Statement (OT) get home   Additional Occupational Profile Info/Pertinent History of Current Problem Patient is a 49-year-old female with MS, chronic hypoxic respiratory failure on 3 LPM O2, obesity hypoventilation syndrome, hypertension, multiple sclerosis, homebound, recent LLE DVT, recent hospitalization for recurrent UTIs E. coli sepsis, and chronic BLE lymphedema who presents to the ED with fever, redness and swelling of the left thigh with associated fever.   Existing Precautions/Restrictions fall   Cognitive Status Examination   Orientation Status orientation to person, place and time   Range of Motion Comprehensive   General Range of Motion no range of motion deficits identified   Strength Comprehensive (MMT)   General Manual Muscle Testing (MMT)  Assessment no strength deficits identified   Bed Mobility   Sit-Supine [place] (Bed Mobility) maximum assist (25% patient effort)   Comment (Bed Mobility) Patient doesn't sleep in bed at home   Transfers   Transfers sit-stand transfer   Sit-Stand Transfer   Sit-Stand [place] (Transfers) contact guard   Sit/Stand Transfer Comments Patient ambulated in room with Min A and FWW   Activities of Daily Living   BADL Assessment/Intervention lower body dressing   Lower Body Dressing Assessment/Training   Comment, (Lower Body Dressing) patient unable to keo socks, per clinical judgement, patient is unable to perform alexander-cares for toileting.   [place] Level (Lower Body Dressing) maximum assist (25% patient effort)   Clinical Impression   Criteria for Skilled Therapeutic Interventions Met (OT) Yes, treatment indicated   OT Diagnosis Decreased ADL independence   Influenced by the following impairments cellulitis   OT Problem List-Impairments impacting ADL problems related to;activity tolerance impaired   Assessment of Occupational Performance 3-5 Performance Deficits   Identified Performance Deficits trsfs, dressing, toileting, endurance   Planned Therapy Interventions (OT) ADL retraining;transfer training   Clinical Decision Making Complexity (OT) detailed assessment/moderate complexity   Risk & Benefits of therapy have been explained evaluation/treatment results reviewed;care plan/treatment goals reviewed   OT Total Evaluation Time   OT Eval, Moderate Complexity Minutes (06272) 15   OT Goals   Therapy Frequency (OT) 5 times/week   OT Predicted Duration/Target Date for Goal Attainment 04/11/25   OT Goals Toilet Transfer/Toileting;Lower Body Dressing;Transfers   OT: Lower Body Dressing Modified independent;using adaptive equipment   OT: Transfer Supervision/stand-by assist   OT: Toilet Transfer/Toileting Supervision/stand-by assist   Self-Care/Home Management   Self-Care/Home Mgmt/ADL, Compensatory, Meal Prep  Minutes (75484) 10   Treatment Detail/Skilled Intervention Patient attempted to ambulate to BR, unable due to decreased endurance. Patient unable to complete alexander-cares/or manage brief during toileting tasks.   OT Discharge Planning   OT Plan Dressing, trsfs, toileting   OT Discharge Recommendation (DC Rec) other (see comments)   OT Rationale for DC Rec While patient is likely close to her baseline ADL level, patient's baseline is the inability to complete dressing and toileting tasks. Patient has had 3 admissions since [facility] and continues to have a decline in her ADL performance. Patient appears unable to care for self at home likely needs to consider more supportive environment in the near future such as assisted living. Patient not likely to benefit from TCU as patient is close her baseline and therefore, unlikely to make signfiicant gains in ADL safety and in fact, will likely decrease in function due to MS diagnosis over time. Patient will need increased assistance in order to discharge home with toileting, ambulation and dressing.   OT Brief overview of current status Patient needs assistance with basic ADLs such as dressing and toielting. Not able to ambulate household distance   OT Total Distance Amb During Session (feet) 4   Total Session Time   Timed Code Treatment Minutes 10   Total Session Time (sum of timed and untimed services) 25

## 2025-04-04 NOTE — PLAN OF CARE
Problem: Adult Inpatient Plan of Care  Goal: Plan of Care Review  Description: The Plan of Care Review/Shift note should be completed every shift.  The Outcome Evaluation is a brief statement about your assessment that the patient is improving, declining, or no change.  This information will be displayed automatically on your shiftnote.  Outcome: Progressing   Goal Outcome Evaluation:    Patient is alert and oriented x4, vitally stable on 3L of O2 baseline, CPAP at night. Denies pain. Left leg cellulitis. IV abx administered. Voiding via female external cath. Able to make needs known. Call light within reach.

## 2025-04-04 NOTE — PLAN OF CARE
Problem: Adult Inpatient Plan of Care  Goal: Absence of Hospital-Acquired Illness or Injury  Intervention: Identify and Manage Fall Risk  Recent Flowsheet Documentation  Taken 4/4/2025 0926 by Marguerite Upton RN  Safety Promotion/Fall Prevention:   safety round/check completed   activity supervised     Problem: Pain Acute  Goal: Optimal Pain Control and Function  Intervention: Prevent or Manage Pain  Recent Flowsheet Documentation  Taken 4/4/2025 0926 by Marguerite Upton RN  Bowel Elimination Promotion: adequate fluid intake promoted  Medication Review/Management:   high-risk medications identified   medications reviewed     Problem: Heart Failure  Goal: Effective Oxygenation and Ventilation  Intervention: Promote Airway Secretion Clearance  Recent Flowsheet Documentation  Taken 4/4/2025 0926 by Marguerite Upton RN  Cough And Deep Breathing: done independently per patient  Activity Management: activity adjusted per tolerance  Intervention: Optimize Oxygenation and Ventilation  Recent Flowsheet Documentation  Taken 4/4/2025 0926 by Marguerite Upton RN  Head of Bed (HOB) Positioning: HOB at 20-30 degrees     Problem: Heart Failure  Goal: Effective Breathing Pattern During Sleep  Intervention: Monitor and Manage Obstructive Sleep Apnea  Recent Flowsheet Documentation  Taken 4/4/2025 0926 by Marguerite Upton RN  Medication Review/Management:   high-risk medications identified   medications reviewed     Problem: Comorbidity Management  Goal: Blood Pressure in Desired Range  Intervention: Maintain Blood Pressure Management  Recent Flowsheet Documentation  Taken 4/4/2025 0926 by Marguerite Upton RN  Medication Review/Management:   high-risk medications identified   medications reviewed  Goal: Maintenance of Heart Failure Symptom Control  Intervention: Maintain Heart Failure Management  Recent Flowsheet Documentation  Taken 4/4/2025 0926 by Marguerite Upton RN  Medication Review/Management:    high-risk medications identified   medications reviewed     Problem: Fall Injury Risk  Goal: Absence of Fall and Fall-Related Injury  Intervention: Identify and Manage Contributors  Recent Flowsheet Documentation  Taken 4/4/2025 0926 by Marguerite Upton RN  Medication Review/Management:   high-risk medications identified   medications reviewed  Intervention: Promote Injury-Free Environment  Recent Flowsheet Documentation  Taken 4/4/2025 0926 by Marguerite Upton RN  Safety Promotion/Fall Prevention:   safety round/check completed   activity supervised   Goal Outcome Evaluation:       Pt alert & oriented x4. VS stable on 3 L NC, 5 L Cpap at night. Pt denies pain in the morning. After working w/ PT, pt did note that both thighs were tender & sore but still better than previously. Morning aldactone & bumex held for systolic BP under 100. LLE thigh wound dressing clean dry & intact, orders to change dressing everyday. Orders for lymphedema therapy consult placed.

## 2025-04-04 NOTE — PLAN OF CARE
"Pt is alert and oriented, has lymphedema bilaterally with cellulitis on the left leg. Patient is on 4 L NC on spot check pulse ox. Patient has purewick in place that is working. Dressing was changed today by [facility] nurse.     BP 90/51 (BP Location: Left arm)   Pulse 90   Temp 99.3  F (37.4  C) (Oral)   Resp 21   Ht 1.626 m (5' 4\")   Wt 123.4 kg (272 lb 0.8 oz)   LMP 03/03/2025 (Within Days)   SpO2 94%   BMI 46.70 kg/m        Von Gandara RN      Problem: Adult Inpatient Plan of Care  Goal: Plan of Care Review  Description: The Plan of Care Review/Shift note should be completed every shift.  The Outcome Evaluation is a brief statement about your assessment that the patient is improving, declining, or no change.  This information will be displayed automatically on your shiftnote.  Outcome: Progressing   Goal Outcome Evaluation:                        "

## 2025-04-04 NOTE — PROGRESS NOTES
[facility] Progress Note - Hospitalist Service    Date of Admission:  4/2/2025    Assessment & Plan   Patient is a 49-year-old female with MS, chronic hypoxic respiratory failure on 3 LPM O2, obesity hypoventilation syndrome, hypertension, multiple sclerosis, homebound, recent LLE DVT, recent hospitalization for recurrent UTIs E. coli sepsis, and chronic BLE lymphedema who presents to the ED with fever, redness and swelling of the left thigh with associated fever.    #Cellulitis, Recurrent, left thigh.  #Recent DVT, LLE  #Chronic lymphemeda  -LLE doppler 3/23/2025: Short segment of vein in deep soft tissues mid left thigh appears noncompressible, corresponds to age-indeterminate occlusive thrombus within L greater saphenous vein.  -BCx, WOC, lymphedema therapy  -Continue Zosyn  - MRSA swab negative discontinue vancomycin.  -Continue PTA Eliquis    #Probable UTI.  UA turbid, with positive leukoesterase, pyuria, negative nitrates.  - Recent hospitalization for E. coli sepsis due to cystitis.  Blood cultures were negative.  - On Zosyn as above.  History of Enterococcus bacteremia.    #Chronic respiratory failure with hypoxia 3 LPM at home.  #Obesity hypoventilation syndrome.  #Pulmonary hypertension  #Chronic HFpEF  -Continue PTA ASA, bumex, spironolactone, Jardiance, statin  - On 3 LPM, which is her baseline    #Diastolic CHF, chronic.  #MDD-on Lexapro.  #HLD-on simvastatin.    - Continue PTA Bumex diet: Regular Diet Adult    DVT Prophylaxis: DOAC  Brar Catheter: Not present  Lines: None     Cardiac Monitoring: None  Code Status: Full Code      Clinically Significant Risk Factors        # Hyponatremia: Lowest Na = 134 mmol/L in last 2 days, will monitor as appropriate  # Hypochloremia: Lowest Cl = 97 mmol/L in last 2 days, will monitor as appropriate            # Hypertension: Noted on problem list  # Chronic heart failure with preserved ejection fraction: heart failure noted on  Pharmacy faxed in a request for prior authorization on:    Med: pantoprazole  Dosage:40mg  Sig:  Take 1 tablet by mouth twice dily  Quantity requested:  180  Form Requested: n/a  ID Number:  [id number]  Insurance Phone Number: [phone]    Preferred pharmacy has been set up and verified.     "problem list and last echo with EF >50%   # Severe Obesity: Estimated body mass index is 43.6 kg/m  as calculated from the following:    Height as of this encounter: 1.626 m (5' 4\").    Weight as of this encounter: 115.2 kg (254 lb)., PRESENT ON ADMISSION     # Financial/Environmental Concerns:      Social Drivers of Health    Food Insecurity: High Risk (3/23/2025)    Food Insecurity     Within the past 12 months, did you worry that your food would run out before you got money to buy more?: Yes     Within the past 12 months, did the food you bought just not last and you didn t have money to get more?: No   Transportation Needs: High Risk (3/23/2025)    Transportation Needs     Within the past 12 months, has lack of transportation kept you from medical appointments, getting your medicines, non-medical meetings or appointments, work, or from getting things that you need?: Yes   Physical Activity: Inactive (3/21/2024)    Exercise Vital Sign     Days of Exercise per Week: 0 days     Minutes of Exercise per Session: 0 min   Interpersonal Safety: High Risk (3/23/2025)    Interpersonal Safety     Do you feel physically and emotionally safe where you currently live?: No     Within the past 12 months, have you been hit, slapped, kicked or otherwise physically hurt by someone?: No     Within the past 12 months, have you been humiliated or emotionally abused in other ways by your partner or ex-partner?: No   Social Connections: Unknown (3/1/2022)    Social Connection and Isolation Panel [NHANES]     Frequency of Communication with Friends and Family: Twice a week     Frequency of Social Gatherings with Friends and Family: Twice a week     Disposition Plan     Medically Ready for Discharge: Anticipated in 2-4 Days    Ligia James MD  Hospitalist Service  [facility]  Securely message with [employer] (more info)  Text page via [employer] Paging/Directory "   ______________________________________________________________________    Interval History   Patient is feeling better today.  No recurrence of fever.  Left leg warmth subsided.  Working with PT/OT, able to transfer with assistance of 1 from bed to chair. Patient denies chest pain, cough, cardiac operations.  O2 requirements at her baseline of 3 LPM.    Physical Exam   Vital Signs: Temp: 98.3  F (36.8  C) Temp src: Axillary BP: 91/50 Pulse: 69   Resp: 20 SpO2: 95 % O2 Device: BiPAP/CPAP Oxygen Delivery: 5 LPM  Weight: 272 lbs .76 oz  General: Alert and oriented x 3. Not in obvious distress.  HEENT: NC, AT. Neck- supple, No JVP elevation, lymphadenopathy or thyromegaly. Trachea-central.  Chest: Clear to auscultation bilaterally.  Heart: S1S2 regular. No M/R/G.  Abdomen: Soft. NT, ND. No organomegaly. Bowel sounds- active.  Back: No spine tenderness. No CVA tenderness.  Extremities: Bilateral leg lymphedema, more prominent on the left.  Wearing compression wrap on the left.  Some skin warmth on the left inner thigh.  Neuro: Cranial nerves 1-12 grossly normal. No focal neurological deficit    Medical Decision Making       51 MINUTES SPENT BY ME on the date of service doing chart review, history, exam, documentation & further activities per the note.      Data     I have personally reviewed the following data over the past 24 hrs:    10.4  \   13.4   / 170     134 (L) 99 17.0 /  101 (H)   3.7 27 0.83 \     ALT: 11 AST: 20 AP: 74 TBILI: 3.0 (H)   ALB: 3.6 TOT PROTEIN: 7.6 LIPASE: N/A     Procal: 0.22 CRP: N/A Lactic Acid: 2.0      Imaging results reviewed over the past 24 hrs:   No results found for this or any previous visit (from the past 24 hours).    This document is created with the help of Dragon dictation system. All grammatical errors are unintentional.

## 2025-04-04 NOTE — CONSULTS
Care Management Follow Up    Length of Stay (days): 2    Expected Discharge Date: 04/05/2025    Anticipated Discharge Plan:       Transportation: Confirmed Wheelchair. Transportation costs discussed? Yes. Discussed with pt on 4/3    PT Recommendations: home with assist, home with home care physical therapy  OT Recommendations:        Barriers to Discharge: medical stability    Prior Living Situation: [facility] home with alone    Discussed  Partnership in Safe Discharge Planning  document with patient/family: No     Handoff Completed: No, handoff not indicated or clinically appropriate    Patient/Spokesperson Updated: No    Additional Information:  Therapy rec is home care. Pt already open to [facility] home RN and PT.     Next Steps: send home care orders at discharge     KAYLEE Flores

## 2025-04-04 NOTE — PHARMACY-VANCOMYCIN DOSING SERVICE
"Pharmacy Vancomycin Note  Date of Service 2025  Patient's  1975   49 year old, female    Indication: Sepsis and Skin and Soft Tissue Infection  Day of Therapy: 3  Current vancomycin regimen:  1000 mg IV q12h  Current vancomycin monitoring method: AUC  Current vancomycin therapeutic monitoring goal: 400-600 mg*h/L    Current estimated CrCl = Estimated Creatinine Clearance: 92.9 mL/min (based on SCr of 0.94 mg/dL).    Creatinine for last 3 days  2025:  9:51 AM Creatinine 0.72 mg/dL  2025:  4:27 PM Creatinine 0.81 mg/dL  4/3/2025:  6:57 AM Creatinine 0.83 mg/dL  2025:  5:35 AM Creatinine 0.94 mg/dL    Recent Vancomycin Levels (past 3 days)  2025:  5:35 AM Vancomycin 17.1 ug/mL    Vancomycin IV Administrations (past 72 hours)                     vancomycin (VANCOCIN) 1,000 mg in 200 mL dextrose intermittent infusion (mg) 1,000 mg New Bag 25 0546     1,000 mg New Bag 25 1952     1,000 mg New Bag  0601    vancomycin (VANCOCIN) 2,500 mg in 0.9% NaCl 525 mL intermittent infusion (mg) 2,500 mg New Bag 25 1724                    Nephrotoxins and other renal medications (From now, onward)      Start     Dose/Rate Route Frequency Ordered Stop    25 0800  bumetanide (BUMEX) tablet 6 mg        Note to Pharmacy: PTA Sig:Take 6 mg by mouth every morning.      6 mg Oral EVERY MORNING 25 0800  empagliflozin (JARDIANCE) tablet 10 mg        Note to Pharmacy: PTA Sig:Take 10 mg by mouth every morning.      10 mg Oral EVERY MORNING 25 0700  vancomycin (VANCOCIN) 1,000 mg in 200 mL dextrose intermittent infusion         1,000 mg  200 mL/hr over 1 Hours Intravenous EVERY 12 HOURS 25 1849      25 2300  piperacillin-tazobactam (ZOSYN) 3.375 g vial to attach to  mL bag        Note to Pharmacy: For [facility], [facility] and [facility]: For Zosyn-naive patients, use the \"Zosyn initial dose + extended infusion\" order panel.    3.375 g  over " 240 Minutes Intravenous EVERY 8 HOURS 04/02/25 1843                 Contrast Orders - past 72 hours (72h ago, onward)      None            Interpretation of levels and current regimen:  Vancomycin level is reflective of -600    Has serum creatinine changed greater than 50% in last 72 hours: No    Renal Function: Stable    [employer]ight[employer] Prediction of Planned New Vancomycin Regimen  Loading dose: 2500mg iv x1  Regimen: 1000 mg IV every 12 hours.  Start time: 17:46 on 04/04/2025  Exposure target: AUC24 (range)400-600 mg/L.hr   AUC24,ss: 546 mg/L.hr  Probability of AUC24 > 400: 100 %  Ctrough,ss: 16.1 mg/L  Probability of Ctrough,ss > 20: 11 %  Probability of nephrotoxicity (Lodise ARMANDO 2009): 11 %      Plan:  Continue Current Dose  Vancomycin monitoring method: AUC  Vancomycin therapeutic monitoring goal: 400-600 mg*h/L  Pharmacy will check vancomycin levels as appropriate in  2-4 days .  Serum creatinine levels will be ordered daily for the first week of therapy and at least twice weekly for subsequent weeks.    Genaro Griffith, [facility]

## 2025-04-05 LAB
CREAT SERPL-MCNC: 0.74 MG/DL (ref 0.51–0.95)
EGFRCR SERPLBLD CKD-EPI 2021: >90 ML/MIN/1.73M2
HOLD SPECIMEN: NORMAL
POTASSIUM SERPL-SCNC: 3.9 MMOL/L (ref 3.4–5.3)

## 2025-04-05 PROCEDURE — 250N000011 HC RX IP 250 OP 636: Performed by: HOSPITALIST

## 2025-04-05 PROCEDURE — 999N000157 HC STATISTIC RCP TIME EA 10 MIN

## 2025-04-05 PROCEDURE — 120N000001 HC R&B MED SURG/OB

## 2025-04-05 PROCEDURE — 250N000013 HC RX MED GY IP 250 OP 250 PS 637: Performed by: HOSPITALIST

## 2025-04-05 PROCEDURE — 99233 SBSQ HOSP IP/OBS HIGH 50: CPT | Performed by: INTERNAL MEDICINE

## 2025-04-05 PROCEDURE — 82565 ASSAY OF CREATININE: CPT | Performed by: FAMILY MEDICINE

## 2025-04-05 PROCEDURE — 36415 COLL VENOUS BLD VENIPUNCTURE: CPT | Performed by: FAMILY MEDICINE

## 2025-04-05 PROCEDURE — 84132 ASSAY OF SERUM POTASSIUM: CPT | Performed by: INTERNAL MEDICINE

## 2025-04-05 PROCEDURE — 94660 CPAP INITIATION&MGMT: CPT

## 2025-04-05 RX ADMIN — PIPERACILLIN AND TAZOBACTAM 3.38 G: 3; .375 INJECTION, POWDER, FOR SOLUTION INTRAVENOUS at 00:09

## 2025-04-05 RX ADMIN — APIXABAN 5 MG: 5 TABLET, FILM COATED ORAL at 20:14

## 2025-04-05 RX ADMIN — SENNOSIDES AND DOCUSATE SODIUM 1 TABLET: 50; 8.6 TABLET ORAL at 16:37

## 2025-04-05 RX ADMIN — ASPIRIN 81 MG: 81 TABLET, COATED ORAL at 08:41

## 2025-04-05 RX ADMIN — ESCITALOPRAM OXALATE 10 MG: 10 TABLET ORAL at 08:41

## 2025-04-05 RX ADMIN — POTASSIUM CHLORIDE 30 MEQ: 1500 TABLET, EXTENDED RELEASE ORAL at 08:42

## 2025-04-05 RX ADMIN — EMPAGLIFLOZIN 10 MG: 10 TABLET, FILM COATED ORAL at 08:41

## 2025-04-05 RX ADMIN — SIMVASTATIN 10 MG: 10 TABLET, FILM COATED ORAL at 22:52

## 2025-04-05 RX ADMIN — PIPERACILLIN AND TAZOBACTAM 3.38 G: 3; .375 INJECTION, POWDER, FOR SOLUTION INTRAVENOUS at 08:39

## 2025-04-05 RX ADMIN — SPIRONOLACTONE 12.5 MG: 25 TABLET, FILM COATED ORAL at 08:46

## 2025-04-05 RX ADMIN — PIPERACILLIN AND TAZOBACTAM 3.38 G: 3; .375 INJECTION, POWDER, FOR SOLUTION INTRAVENOUS at 16:37

## 2025-04-05 RX ADMIN — APIXABAN 5 MG: 5 TABLET, FILM COATED ORAL at 08:41

## 2025-04-05 ASSESSMENT — ACTIVITIES OF DAILY LIVING (ADL)
ADLS_ACUITY_SCORE: 65
ADLS_ACUITY_SCORE: 64
ADLS_ACUITY_SCORE: 65
ADLS_ACUITY_SCORE: 64
ADLS_ACUITY_SCORE: 65
ADLS_ACUITY_SCORE: 64
ADLS_ACUITY_SCORE: 64
ADLS_ACUITY_SCORE: 65
ADLS_ACUITY_SCORE: 64
ADLS_ACUITY_SCORE: 65
ADLS_ACUITY_SCORE: 64
ADLS_ACUITY_SCORE: 64
ADLS_ACUITY_SCORE: 65
ADLS_ACUITY_SCORE: 64
ADLS_ACUITY_SCORE: 64

## 2025-04-05 NOTE — PROGRESS NOTES
Patient placed on hospital CPAP at 3L O2. No obstructions noted. RT will follow.    Soto Valero, RT

## 2025-04-05 NOTE — PLAN OF CARE
Problem: Adult Inpatient Plan of Care  Goal: Absence of Hospital-Acquired Illness or Injury  Intervention: Identify and Manage Fall Risk  Recent Flowsheet Documentation  Taken 4/5/2025 0847 by Marguerite Upton RN  Safety Promotion/Fall Prevention:   safety round/check completed   activity supervised     Problem: Adult Inpatient Plan of Care  Goal: Absence of Hospital-Acquired Illness or Injury  Intervention: Prevent and Manage VTE (Venous Thromboembolism) Risk  Recent Flowsheet Documentation  Taken 4/5/2025 0847 by Marguerite Upton RN  VTE Prevention/Management: other (see comments)     Problem: Pain Acute  Goal: Optimal Pain Control and Function  Intervention: Prevent or Manage Pain  Recent Flowsheet Documentation  Taken 4/5/2025 0847 by Marguerite Upton RN  Bowel Elimination Promotion: adequate fluid intake promoted  Medication Review/Management:   high-risk medications identified   medications reviewed     Problem: Heart Failure  Goal: Optimal Coping  Intervention: Support Psychosocial Response  Recent Flowsheet Documentation  Taken 4/5/2025 0847 by Marguerite Upton RN  Family/Support System Care: self-care encouraged  Goal: Optimal Cardiac Output  Intervention: Optimize Cardiac Output  Recent Flowsheet Documentation  Taken 4/5/2025 0847 by Marguerite Upton RN  Environmental Support: calm environment promoted  Goal: Optimal Functional Ability  Intervention: Optimize Functional Ability  Recent Flowsheet Documentation  Taken 4/5/2025 0847 by Marguerite Upton RN  Activity Management: activity adjusted per tolerance  Goal: Effective Oxygenation and Ventilation  Intervention: Promote Airway Secretion Clearance  Recent Flowsheet Documentation  Taken 4/5/2025 0847 by Marguerite Upton RN  Cough And Deep Breathing: done independently per patient  Activity Management: activity adjusted per tolerance  Intervention: Optimize Oxygenation and Ventilation  Recent Flowsheet Documentation  Taken  4/5/2025 1237 by Marguerite Upton RN  Head of Bed (HOB) Positioning: HOB at 30-45 degrees  Taken 4/5/2025 1037 by Marguerite Upton RN  Head of Bed (HOB) Positioning: HOB at 30-45 degrees  Taken 4/5/2025 0847 by Marguerite Upton RN  Head of Bed (HOB) Positioning: HOB at 30-45 degrees  Goal: Effective Breathing Pattern During Sleep  Intervention: Monitor and Manage Obstructive Sleep Apnea  Recent Flowsheet Documentation  Taken 4/5/2025 0847 by Marguerite Upton RN  Medication Review/Management:   high-risk medications identified   medications reviewed     Problem: Heart Failure  Goal: Effective Oxygenation and Ventilation  Intervention: Promote Airway Secretion Clearance  Recent Flowsheet Documentation  Taken 4/5/2025 0847 by Marguerite Upton RN  Cough And Deep Breathing: done independently per patient  Activity Management: activity adjusted per tolerance  Intervention: Optimize Oxygenation and Ventilation  Recent Flowsheet Documentation  Taken 4/5/2025 1237 by Marguerite Upton RN  Head of Bed (HOB) Positioning: HOB at 30-45 degrees  Taken 4/5/2025 1037 by Marguerite Upton RN  Head of Bed (HOB) Positioning: HOB at 30-45 degrees  Taken 4/5/2025 0847 by Marguerite Upton RN  Head of Bed (HOB) Positioning: HOB at 30-45 degrees     Problem: Comorbidity Management  Goal: Blood Pressure in Desired Range  Intervention: Maintain Blood Pressure Management  Recent Flowsheet Documentation  Taken 4/5/2025 0847 by Marguerite Upton RN  Medication Review/Management:   high-risk medications identified   medications reviewed  Goal: Maintenance of Heart Failure Symptom Control  Intervention: Maintain Heart Failure Management  Recent Flowsheet Documentation  Taken 4/5/2025 0847 by Marguerite Upton RN  Medication Review/Management:   high-risk medications identified   medications reviewed   Goal Outcome Evaluation:       Pt alert & oriented x4. VS stable on 3 L NC. Pt denies SOB or difficulty  breathing. Denies pain. LLE warm to touch but not notably different in comparison to the RLE. New diet orders placed for fluid restriction & low sodium diet. Continuing to receive IV antibx for infection.

## 2025-04-05 NOTE — PLAN OF CARE
"  Problem: Adult Inpatient Plan of Care  Goal: Plan of Care Review  Description: The Plan of Care Review/Shift note should be completed every shift.  The Outcome Evaluation is a brief statement about your assessment that the patient is improving, declining, or no change.  This information will be displayed automatically on your shiftnote.  Outcome: Progressing  Goal: Patient-Specific Goal (Individualized)  Description: You can add care plan individualizations to a care plan. Examples of Individualization might be:  \"Parent requests to be called daily at 9am for status\", \"I have a hard time hearing out of my right ear\", or \"Do not touch me to wake me up as it startlesme\".  Outcome: Progressing  Goal: Absence of Hospital-Acquired Illness or Injury  Outcome: Progressing  Intervention: Identify and Manage Fall Risk  Recent Flowsheet Documentation  Taken 4/4/2025 1629 by Jessica Chavez RN  Safety Promotion/Fall Prevention:   mobility aid in reach   nonskid shoes/slippers when out of bed  Intervention: Prevent Skin Injury  Recent Flowsheet Documentation  Taken 4/4/2025 2237 by Jessica Chavez RN  Body Position:   supine   heels elevated  Taken 4/4/2025 2037 by Jessica Chavez RN  Body Position:   heels elevated   supine  Taken 4/4/2025 1626 by Jessica Chavez RN  Body Position:   heels elevated   supine  Goal: Optimal Comfort and Wellbeing  Outcome: Progressing  Intervention: Monitor Pain and Promote Comfort  Recent Flowsheet Documentation  Taken 4/4/2025 1629 by Jessica Chavez RN  Pain Management Interventions:   quiet environment facilitated   repositioned   pillow support provided  Goal: Readiness for Transition of Care  Outcome: Progressing     Problem: Pain Acute  Goal: Optimal Pain Control and Function  Outcome: Progressing  Intervention: Develop Pain Management Plan  Recent Flowsheet Documentation  Taken 4/4/2025 1629 by Jessica Chavez RN  Pain Management Interventions:   quiet environment facilitated   repositioned   pillow support " provided  Intervention: Prevent or Manage Pain  Recent Flowsheet Documentation  Taken 4/4/2025 1629 by Jessica Chavez RN  Sensory Stimulation Regulation: care clustered  Bowel Elimination Promotion: adequate fluid intake promoted  Medication Review/Management: medications reviewed     Problem: Heart Failure  Goal: Optimal Coping  Outcome: Progressing  Goal: Fluid and Electrolyte Balance  Outcome: Progressing  Goal: Optimal Functional Ability  Outcome: Progressing  Intervention: Optimize Functional Ability  Recent Flowsheet Documentation  Taken 4/4/2025 2037 by Jessica Chavez RN  Activity Management: activity adjusted per tolerance  Taken 4/4/2025 1626 by Jessica Chavez RN  Activity Management: activity adjusted per tolerance  Goal: Improved Oral Intake  Outcome: Progressing  Goal: Effective Oxygenation and Ventilation  Outcome: Progressing  Intervention: Promote Airway Secretion Clearance  Recent Flowsheet Documentation  Taken 4/4/2025 2037 by Jessica Chavez RN  Activity Management: activity adjusted per tolerance  Taken 4/4/2025 1629 by Jessica Chavez RN  Cough And Deep Breathing: done independently per patient  Taken 4/4/2025 1626 by Jessica Chavez RN  Activity Management: activity adjusted per tolerance  Intervention: Optimize Oxygenation and Ventilation  Recent Flowsheet Documentation  Taken 4/4/2025 2237 by Jessica Chavez RN  Head of Bed (HOB) Positioning: HOB at 20-30 degrees  Taken 4/4/2025 2037 by Jessica Chavez RN  Head of Bed (HOB) Positioning: HOB at 20-30 degrees  Taken 4/4/2025 1837 by Jessica Chavez RN  Head of Bed (HOB) Positioning: HOB at 30-45 degrees  Taken 4/4/2025 1626 by Jessica Chavez RN  Head of Bed (HOB) Positioning: HOB at 30-45 degrees  Goal: Effective Breathing Pattern During Sleep  Outcome: Progressing  Intervention: Monitor and Manage Obstructive Sleep Apnea  Recent Flowsheet Documentation  Taken 4/4/2025 1629 by Jessica Chavez RN  Medication Review/Management: medications reviewed     Problem: Skin Injury Risk Increased  Goal:  Skin Health and Integrity  Outcome: Progressing  Intervention: Plan: Nurse Driven Intervention: Moisture Management  Recent Flowsheet Documentation  Taken 4/4/2025 2037 by Jessica Chavez RN  Bathing/Skin Care:   bath, complete   dressed/undressed   hair washed   incontinence care   linen changed  Taken 4/4/2025 1629 by Jessica Chavez RN  Moisture Interventions:   Incontinence pad   Urinary collection device  Intervention: Plan: Nurse Driven Intervention: Friction and Shear  Recent Flowsheet Documentation  Taken 4/4/2025 1629 by Jessica Chavez RN  Friction/Shear Interventions: HOB 30 degrees or less  Intervention: Optimize Skin Protection  Recent Flowsheet Documentation  Taken 4/4/2025 2237 by Jessica Chavez RN  Head of Bed (HOB) Positioning: HOB at 20-30 degrees  Taken 4/4/2025 2037 by Jessica Chavez RN  Activity Management: activity adjusted per tolerance  Head of Bed (HOB) Positioning: HOB at 20-30 degrees  Taken 4/4/2025 1837 by Jessica Chavez RN  Head of Bed (HOB) Positioning: HOB at 30-45 degrees  Taken 4/4/2025 1626 by Jessica Chavez RN  Activity Management: activity adjusted per tolerance  Head of Bed (HOB) Positioning: HOB at 30-45 degrees     Problem: Fall Injury Risk  Goal: Absence of Fall and Fall-Related Injury  Outcome: Progressing  Intervention: Identify and Manage Contributors  Recent Flowsheet Documentation  Taken 4/4/2025 1629 by Jessica Chavez RN  Medication Review/Management: medications reviewed  Intervention: Promote Injury-Free Environment  Recent Flowsheet Documentation  Taken 4/4/2025 1629 by Jessica Chavez RN  Safety Promotion/Fall Prevention:   mobility aid in reach   nonskid shoes/slippers when out of bed     Goal Outcome Evaluation:       Pt alert and oriented X4. Pt is able to make needs known. Pt denies pain this shift. Scheduled meds given. Pt compliant. Pt reposition in bed independently, weight shifting. Pt voiding with purewic in place and no BM this shift. Pt is on 3L NC and CPAP on overnight. LLE thigh, wound care  completed this shift.     Jessica Chavez RN

## 2025-04-05 NOTE — PROGRESS NOTES
[facility] Progress Note - Hospitalist Service    Date of Admission:  4/2/2025    Assessment & Plan   Patient is a 49-year-old female with MS, chronic hypoxic respiratory failure on 3 LPM O2, obesity hypoventilation syndrome, hypertension, multiple sclerosis, homebound, recent LLE DVT, recent hospitalization for recurrent UTIs E. coli sepsis, and chronic BLE lymphedema who presents to the ED with fever, redness and swelling of the left thigh with associated fever.    #Cellulitis, Recurrent, left thigh.  #Recent DVT, LLE  #Chronic lymphemeda  -LLE doppler 3/23/2025: Short segment of vein in deep soft tissues mid left thigh appears noncompressible, corresponds to age-indeterminate occlusive thrombus within L greater saphenous vein.  -BCx, WOC, lymphedema therapy  -Continue Zosyn  - MRSA swab negative discontinue vancomycin.  -Continue PTA Eliquis    #Probable UTI.  UA turbid, with positive leukoesterase, pyuria, negative nitrates.  - Recent hospitalization for E. coli sepsis due to cystitis.  Blood cultures were negative.  - On Zosyn as above.  History of Enterococcus bacteremia.    #Chronic respiratory failure with hypoxia 3 LPM at home.  #Obesity hypoventilation syndrome.  #Pulmonary hypertension  #Chronic HFpEF  -Continue PTA ASA, bumex, spironolactone, Jardiance, statin  - On 3 LPM, which is her baseline    #Diastolic CHF, chronic.  #MDD-on Lexapro.  #HLD-on simvastatin.    - Continue PTA Bumex diet: 2 Gram Sodium Diet  Fluid restriction 1800 ML FLUID    DVT Prophylaxis: DOAC  Brar Catheter: Not present  Lines: None     Cardiac Monitoring: None  Code Status: Full Code      Clinically Significant Risk Factors        # Hyponatremia: Lowest Na = 134 mmol/L in last 2 days, will monitor as appropriate  # Hypochloremia: Lowest Cl = 97 mmol/L in last 2 days, will monitor as appropriate            # Hypertension: Noted on problem list  # Chronic heart failure with preserved ejection  "fraction: heart failure noted on problem list and last echo with EF >50%   # Severe Obesity: Estimated body mass index is 43.6 kg/m  as calculated from the following:    Height as of this encounter: 1.626 m (5' 4\").    Weight as of this encounter: 115.2 kg (254 lb)., PRESENT ON ADMISSION     # Financial/Environmental Concerns:      Social Drivers of Health    Food Insecurity: High Risk (3/23/2025)    Food Insecurity     Within the past 12 months, did you worry that your food would run out before you got money to buy more?: Yes     Within the past 12 months, did the food you bought just not last and you didn t have money to get more?: No   Transportation Needs: High Risk (3/23/2025)    Transportation Needs     Within the past 12 months, has lack of transportation kept you from medical appointments, getting your medicines, non-medical meetings or appointments, work, or from getting things that you need?: Yes   Physical Activity: Inactive (3/21/2024)    Exercise Vital Sign     Days of Exercise per Week: 0 days     Minutes of Exercise per Session: 0 min   Interpersonal Safety: High Risk (3/23/2025)    Interpersonal Safety     Do you feel physically and emotionally safe where you currently live?: No     Within the past 12 months, have you been hit, slapped, kicked or otherwise physically hurt by someone?: No     Within the past 12 months, have you been humiliated or emotionally abused in other ways by your partner or ex-partner?: No   Social Connections: Unknown (3/1/2022)    Social Connection and Isolation Panel [NHANES]     Frequency of Communication with Friends and Family: Twice a week     Frequency of Social Gatherings with Friends and Family: Twice a week     Disposition Plan     Medically Ready for Discharge: Anticipated in 2-4 Days    Ligia James MD  Hospitalist Service  [facility]  Securely message with [employer] (more info)  Text page via [employer] Paging/Directory "   ______________________________________________________________________    Interval History   No new complaints.  No recurrence of fever.  Left leg warmth subsided.  Working with PT/OT, able to transfer with assistance of 1 from bed to chair.  O2 requirements at baseline, 3 LPM.    Physical Exam   Vital Signs: Temp: 98.3  F (36.8  C) Temp src: Oral BP: 96/55 Pulse: 80   Resp: 20 SpO2: 93 % O2 Device: Nasal cannula Oxygen Delivery: 3 LPM  Weight: 272 lbs .76 oz  General: Alert and oriented x 3. Not in obvious distress.  HEENT: NC, AT. Neck- supple, No JVP elevation, lymphadenopathy or thyromegaly. Trachea-central.  Chest: Clear to auscultation bilaterally.  Heart: S1S2 regular. No M/R/G.  Abdomen: Soft. NT, ND. No organomegaly. Bowel sounds- active.  Back: No spine tenderness. No CVA tenderness.  Extremities: Bilateral leg lymphedema, more prominent on the left.  Wearing compression wrap on the left.  Some skin warmth on the left inner thigh.  Neuro: Cranial nerves 1-12 grossly normal. No focal neurological deficit    Medical Decision Making       41 MINUTES SPENT BY ME on the date of service doing chart review, history, exam, documentation & further activities per the note.      Data     I have personally reviewed the following data over the past 24 hrs:    10.4  \   13.4   / 170     134 (L) 99 17.0 /  101 (H)   3.7 27 0.83 \     ALT: 11 AST: 20 AP: 74 TBILI: 3.0 (H)   ALB: 3.6 TOT PROTEIN: 7.6 LIPASE: N/A     Procal: 0.22 CRP: N/A Lactic Acid: 2.0      Imaging results reviewed over the past 24 hrs:   No results found for this or any previous visit (from the past 24 hours).    This document is created with the help of Dragon dictation system. All grammatical errors are unintentional.

## 2025-04-05 NOTE — PLAN OF CARE
"  Problem: Adult Inpatient Plan of Care  Goal: Plan of Care Review  Description: The Plan of Care Review/Shift note should be completed every shift.  The Outcome Evaluation is a brief statement about your assessment that the patient is improving, declining, or no change.  This information will be displayed automatically on your shiftnote.  Outcome: Progressing  Goal: Patient-Specific Goal (Individualized)  Description: You can add care plan individualizations to a care plan. Examples of Individualization might be:  \"Parent requests to be called daily at 9am for status\", \"I have a hard time hearing out of my right ear\", or \"Do not touch me to wake me up as it startlesme\".  Outcome: Progressing  Goal: Absence of Hospital-Acquired Illness or Injury  Outcome: Progressing  Intervention: Identify and Manage Fall Risk  Recent Flowsheet Documentation  Taken 4/5/2025 1600 by Jessica Chavez RN  Safety Promotion/Fall Prevention:   mobility aid in reach   nonskid shoes/slippers when out of bed  Intervention: Prevent Skin Injury  Recent Flowsheet Documentation  Taken 4/5/2025 1637 by Jessica Chavez RN  Body Position: sitting up in bed  Taken 4/5/2025 1600 by Jessica Chavez RN  Body Position: position changed independently  Skin Protection: incontinence pads utilized  Goal: Optimal Comfort and Wellbeing  Outcome: Progressing  Intervention: Monitor Pain and Promote Comfort  Recent Flowsheet Documentation  Taken 4/5/2025 1600 by Jessica Chavez RN  Pain Management Interventions:   pillow support provided   quiet environment facilitated   distraction  Goal: Readiness for Transition of Care  Outcome: Progressing     Problem: Pain Acute  Goal: Optimal Pain Control and Function  Outcome: Progressing  Intervention: Develop Pain Management Plan  Recent Flowsheet Documentation  Taken 4/5/2025 1600 by Jessica Chavez RN  Pain Management Interventions:   pillow support provided   quiet environment facilitated   distraction  Intervention: Prevent or Manage " Pain  Recent Flowsheet Documentation  Taken 4/5/2025 1600 by Jessica Chavez RN  Sensory Stimulation Regulation: care clustered  Bowel Elimination Promotion: adequate fluid intake promoted  Medication Review/Management: medications reviewed     Problem: Heart Failure  Goal: Optimal Coping  Outcome: Progressing  Goal: Optimal Functional Ability  Outcome: Progressing  Intervention: Optimize Functional Ability  Recent Flowsheet Documentation  Taken 4/5/2025 1600 by Jessica Chavez RN  Activity Management: activity adjusted per tolerance  Goal: Improved Oral Intake  Outcome: Progressing  Goal: Effective Oxygenation and Ventilation  Outcome: Progressing  Intervention: Promote Airway Secretion Clearance  Recent Flowsheet Documentation  Taken 4/5/2025 1600 by Jessica Chavez RN  Cough And Deep Breathing: done independently per patient  Activity Management: activity adjusted per tolerance  Intervention: Optimize Oxygenation and Ventilation  Recent Flowsheet Documentation  Taken 4/5/2025 1637 by Jessica Chavez RN  Head of Bed (HOB) Positioning: HOB at 30-45 degrees  Taken 4/5/2025 1600 by Jessica Chavez RN  Head of Bed (HOB) Positioning: HOB at 30-45 degrees  Goal: Effective Breathing Pattern During Sleep  Outcome: Progressing  Intervention: Monitor and Manage Obstructive Sleep Apnea  Recent Flowsheet Documentation  Taken 4/5/2025 1600 by Jessica Chavez RN  Medication Review/Management: medications reviewed     Problem: Skin Injury Risk Increased  Goal: Skin Health and Integrity  Outcome: Progressing  Intervention: Plan: Nurse Driven Intervention: Moisture Management  Recent Flowsheet Documentation  Taken 4/5/2025 1600 by Jessica Chavez RN  Moisture Interventions:   Incontinence pad   Urinary collection device  Intervention: Plan: Nurse Driven Intervention: Friction and Shear  Recent Flowsheet Documentation  Taken 4/5/2025 1600 by Jessica Chavez RN  Friction/Shear Interventions: HOB 30 degrees or less  Intervention: Optimize Skin Protection  Recent Flowsheet  "Documentation  Taken 4/5/2025 1637 by Jessica Chavez RN  Head of Bed ([facility]) Positioning: HOB at 30-45 degrees  Taken 4/5/2025 1600 by Jessica Chavez RN  Pressure Reduction Techniques: heels elevated off bed  Skin Protection: incontinence pads utilized  Activity Management: activity adjusted per tolerance  Head of Bed (HOB) Positioning: HOB at 30-45 degrees     Problem: Gas Exchange Impaired  Goal: Optimal Gas Exchange  Outcome: Progressing  Intervention: Optimize Oxygenation and Ventilation  Recent Flowsheet Documentation  Taken 4/5/2025 1637 by Jessica Chavez RN  Head of Bed (HOB) Positioning: HOB at 30-45 degrees  Taken 4/5/2025 1600 by Jessica Chavez RN  Head of Bed ([facility]) Positioning: HOB at 30-45 degrees     Problem: UTI (Urinary Tract Infection)  Goal: Improved Infection Symptoms  Outcome: Progressing     Problem: Skin or Soft Tissue Infection  Goal: Absence of Infection Signs and Symptoms  Outcome: Progressing     Problem: Fall Injury Risk  Goal: Absence of Fall and Fall-Related Injury  Outcome: Progressing  Intervention: Identify and Manage Contributors  Recent Flowsheet Documentation  Taken 4/5/2025 1600 by Jessica Chavez RN  Medication Review/Management: medications reviewed  Intervention: Promote Injury-Free Environment  Recent Flowsheet Documentation  Taken 4/5/2025 1600 by Jessica Chavez RN  Safety Promotion/Fall Prevention:   mobility aid in reach   nonskid shoes/slippers when out of bed     Goal Outcome Evaluation:       Pt alert and oriented X4. Pt is able to make needs known. Pt denies pain this shift. Pt reposition independently; weight-shifting. Pt reported having constipation, PRN senna given. Pt voiding with purewic in place and hard BM this shift. Pt states \"BM may be slightly bloody due to constipation, last BM last Tuesday and at home it is usually like this because I am on blood thinner and also on my period.\" Plan of care ongoing. Pt on 3L NC this evening, CPAP on overnight. Pt is on 1800 fluid restriction and low 2 " gram sodium diet.     Jessica Chavez RN

## 2025-04-06 PROCEDURE — 120N000001 HC R&B MED SURG/OB

## 2025-04-06 PROCEDURE — 99232 SBSQ HOSP IP/OBS MODERATE 35: CPT | Performed by: INTERNAL MEDICINE

## 2025-04-06 PROCEDURE — 94660 CPAP INITIATION&MGMT: CPT

## 2025-04-06 PROCEDURE — 250N000013 HC RX MED GY IP 250 OP 250 PS 637: Performed by: HOSPITALIST

## 2025-04-06 PROCEDURE — 250N000011 HC RX IP 250 OP 636: Performed by: HOSPITALIST

## 2025-04-06 PROCEDURE — 999N000157 HC STATISTIC RCP TIME EA 10 MIN

## 2025-04-06 RX ADMIN — SPIRONOLACTONE 12.5 MG: 25 TABLET, FILM COATED ORAL at 09:40

## 2025-04-06 RX ADMIN — APIXABAN 5 MG: 5 TABLET, FILM COATED ORAL at 20:30

## 2025-04-06 RX ADMIN — PIPERACILLIN AND TAZOBACTAM 3.38 G: 3; .375 INJECTION, POWDER, FOR SOLUTION INTRAVENOUS at 00:35

## 2025-04-06 RX ADMIN — MICONAZOLE NITRATE ANTIFUNGAL POWDER: 2 POWDER TOPICAL at 14:33

## 2025-04-06 RX ADMIN — PIPERACILLIN AND TAZOBACTAM 3.38 G: 3; .375 INJECTION, POWDER, FOR SOLUTION INTRAVENOUS at 17:55

## 2025-04-06 RX ADMIN — EMPAGLIFLOZIN 10 MG: 10 TABLET, FILM COATED ORAL at 09:39

## 2025-04-06 RX ADMIN — ESCITALOPRAM OXALATE 10 MG: 10 TABLET ORAL at 09:40

## 2025-04-06 RX ADMIN — ASPIRIN 81 MG: 81 TABLET, COATED ORAL at 09:40

## 2025-04-06 RX ADMIN — POTASSIUM CHLORIDE 30 MEQ: 1500 TABLET, EXTENDED RELEASE ORAL at 09:39

## 2025-04-06 RX ADMIN — APIXABAN 5 MG: 5 TABLET, FILM COATED ORAL at 09:40

## 2025-04-06 RX ADMIN — PIPERACILLIN AND TAZOBACTAM 3.38 G: 3; .375 INJECTION, POWDER, FOR SOLUTION INTRAVENOUS at 09:41

## 2025-04-06 RX ADMIN — SIMVASTATIN 10 MG: 10 TABLET, FILM COATED ORAL at 21:37

## 2025-04-06 ASSESSMENT — ACTIVITIES OF DAILY LIVING (ADL)
ADLS_ACUITY_SCORE: 64
ADLS_ACUITY_SCORE: 62
ADLS_ACUITY_SCORE: 64
ADLS_ACUITY_SCORE: 62
ADLS_ACUITY_SCORE: 64

## 2025-04-06 NOTE — PROGRESS NOTES
[facility]    Medicine Progress Note - Hospitalist Service    Date of Admission:  4/2/2025    Assessment & Plan   Patient is a 49-year-old female with MS, chronic hypoxic respiratory failure on 3 LPM O2, obesity hypoventilation syndrome, hypertension, multiple sclerosis, homebound, recent LLE DVT, recent hospitalization for recurrent UTIs E. coli sepsis, and chronic BLE lymphedema who presents to the ED with fever, redness and swelling of the left thigh with associated fever.    #Cellulitis, Recurrent, left thigh.  Clinically improving.  #Recent DVT, LLE anticoagulated on Eliquis.  #Chronic lymphedema  -LLE doppler 3/23/2025: Short segment of vein in deep soft tissues mid left thigh appears noncompressible, corresponds to age-indeterminate occlusive thrombus within L greater saphenous vein.  -BCx, WOC, lymphedema therapy  -Continue Zosyn, transition to Augmentin at discharge.  - MRSA swab negative, discontinue vancomycin.  -Continue PTA Eliquis    #Probable UTI.  UA turbid, with positive leukoesterase, pyuria, negative nitrates.  Culture without growth.  - Recent hospitalization for E. coli sepsis due to cystitis.  Blood cultures were negative.  - On Zosyn as above.  History of Enterococcus bacteremia.    #Chronic respiratory failure with hypoxia 3 LPM at home.  #Obesity hypoventilation syndrome.  #Pulmonary hypertension  #Chronic HFpEF  -Continue PTA ASA, bumex, spironolactone, Jardiance, statin  - On 3 LPM, which is her baseline    #Diastolic CHF, chronic.  #MDD-on Lexapro.  #HLD-on simvastatin.    - Continue PTA Bumex diet: 2 Gram Sodium Diet  Fluid restriction 1800 ML FLUID    DVT Prophylaxis: DOAC  Brar Catheter: Not present  Lines: None     Cardiac Monitoring: None  Code Status: Full Code      Clinically Significant Risk Factors        # Hyponatremia: Lowest Na = 134 mmol/L in last 2 days, will monitor as appropriate  # Hypochloremia: Lowest Cl = 97 mmol/L in last 2 days, will monitor  "as appropriate            # Hypertension: Noted on problem list  # Chronic heart failure with preserved ejection fraction: heart failure noted on problem list and last echo with EF >50%   # Severe Obesity: Estimated body mass index is 43.6 kg/m  as calculated from the following:    Height as of this encounter: 1.626 m (5' 4\").    Weight as of this encounter: 115.2 kg (254 lb)., PRESENT ON ADMISSION     # Financial/Environmental Concerns:      Social Drivers of Health    Food Insecurity: High Risk (3/23/2025)    Food Insecurity     Within the past 12 months, did you worry that your food would run out before you got money to buy more?: Yes     Within the past 12 months, did the food you bought just not last and you didn t have money to get more?: No   Transportation Needs: High Risk (3/23/2025)    Transportation Needs     Within the past 12 months, has lack of transportation kept you from medical appointments, getting your medicines, non-medical meetings or appointments, work, or from getting things that you need?: Yes   Physical Activity: Inactive (3/21/2024)    Exercise Vital Sign     Days of Exercise per Week: 0 days     Minutes of Exercise per Session: 0 min   Interpersonal Safety: High Risk (3/23/2025)    Interpersonal Safety     Do you feel physically and emotionally safe where you currently live?: No     Within the past 12 months, have you been hit, slapped, kicked or otherwise physically hurt by someone?: No     Within the past 12 months, have you been humiliated or emotionally abused in other ways by your partner or ex-partner?: No   Social Connections: Unknown (3/1/2022)    Social Connection and Isolation Panel [NHANES]     Frequency of Communication with Friends and Family: Twice a week     Frequency of Social Gatherings with Friends and Family: Twice a week     Disposition Plan     Medically Ready for Discharge: Anticipated in 2-4 Days    Ligia James MD  Hospitalist Service  [facility]  Securely message with [employer] (more info)  Text page via [facility] Paging/Directory   ______________________________________________________________________    Interval History   No new complaints.  No recurrence of fever.  Left leg warmth since subsided.  Working with PT/OT, able to transfer with assistance of 1 from bed to chair.  O2 requirements at baseline, 3 LPM.  Discussed discharge plans for home with [facility] for tomorrow.    Physical Exam   Vital Signs: Temp: 97.5  F (36.4  C) Temp src: Oral BP: 103/64 Pulse: 68   Resp: 18 SpO2: 96 % O2 Device: Nasal cannula Oxygen Delivery: 3 LPM  Weight: 272 lbs .76 oz  General: Alert and oriented x 3. Not in obvious distress.  HEENT: NC, AT. Neck- supple, No JVP elevation, lymphadenopathy or thyromegaly. Trachea-central.  Chest: Clear to auscultation bilaterally.  Heart: S1S2 regular. No M/R/G.  Abdomen: Soft. NT, ND. No organomegaly. Bowel sounds- active.  Back: No spine tenderness. No CVA tenderness.  Extremities: Bilateral leg lymphedema, more prominent on the left.  Wearing compression wrap on the left.  Some skin warmth on the left inner thigh.  Neuro: Cranial nerves 1-12 grossly normal. No focal neurological deficit    Medical Decision Making       41 MINUTES SPENT BY ME on the date of service doing chart review, history, exam, documentation & further activities per the note.      Data       10.4  \   13.4   / 170     134 (L) 99 17.0 /  101 (H)   3.7 27 0.83 \     ALT: 11 AST: 20 AP: 74 TBILI: 3.0 (H)   ALB: 3.6 TOT PROTEIN: 7.6 LIPASE: N/A     Procal: 0.22 CRP: N/A Lactic Acid: 2.0      Imaging results reviewed over the past 24 hrs:   No results found for this or any previous visit (from the past 24 hours).    This document is created with the help of Dragon dictation system. All grammatical errors are unintentional.

## 2025-04-06 NOTE — PLAN OF CARE
Problem: Pain Acute  Goal: Optimal Pain Control and Function  Outcome: Progressing  Intervention: Prevent or Manage Pain  Recent Flowsheet Documentation  Taken 4/6/2025 0035 by Klarissa Caputo, RN  Sensory Stimulation Regulation: care clustered  Bowel Elimination Promotion: adequate fluid intake promoted  Medication Review/Management: medications reviewed     Problem: Infection  Goal: Absence of Infection Signs and Symptoms  Outcome: Progressing     Goal Outcome Evaluation:         Vitals stable. Afebrile. Denied any pain. Got IV zosyn tonight. Purewick in place. Patient has her monthly period. Slept.

## 2025-04-06 NOTE — PLAN OF CARE
Problem: Adult Inpatient Plan of Care  Goal: Absence of Hospital-Acquired Illness or Injury  Intervention: Identify and Manage Fall Risk  Recent Flowsheet Documentation  Taken 4/6/2025 0945 by Marguerite Upton RN  Safety Promotion/Fall Prevention:   safety round/check completed   activity supervised  Intervention: Prevent Skin Injury  Recent Flowsheet Documentation  Taken 4/6/2025 1237 by Marguerite Upotn RN  Body Position: other (see comments)  Taken 4/6/2025 0945 by Marguerite Upton RN  Body Position: position changed independently  Taken 4/6/2025 0837 by Marguerite Upton RN  Body Position: sitting up in bed  Intervention: Prevent and Manage VTE (Venous Thromboembolism) Risk  Recent Flowsheet Documentation  Taken 4/6/2025 0945 by Marguerite Upton RN  VTE Prevention/Management: other (see comments)  Intervention: Prevent Infection  Recent Flowsheet Documentation  Taken 4/6/2025 0945 by Marguerite Upton RN  Infection Prevention: equipment surfaces disinfected     Problem: Pain Acute  Goal: Optimal Pain Control and Function  Intervention: Prevent or Manage Pain  Recent Flowsheet Documentation  Taken 4/6/2025 0945 by Marguerite Upton RN  Bowel Elimination Promotion: adequate fluid intake promoted  Medication Review/Management:   high-risk medications identified   medications reviewed     Problem: Heart Failure  Goal: Effective Oxygenation and Ventilation  Intervention: Promote Airway Secretion Clearance  Recent Flowsheet Documentation  Taken 4/6/2025 0945 by Marguerite Upton RN  Cough And Deep Breathing: done independently per patient  Activity Management: activity adjusted per tolerance  Intervention: Optimize Oxygenation and Ventilation  Recent Flowsheet Documentation  Taken 4/6/2025 1237 by Marguerite Upton RN  Head of Bed (HOB) Positioning: other (see comments)  Taken 4/6/2025 0945 by Marguerite Upton RN  Head of Bed (HOB) Positioning: HOB at 30-45 degrees  Taken 4/6/2025  0837 by Marguerite Upton RN  Head of Bed (HOB) Positioning: HOB at 30-45 degrees     Problem: Skin Injury Risk Increased  Goal: Skin Health and Integrity  Intervention: Plan: Nurse Driven Intervention: Moisture Management  Recent Flowsheet Documentation  Taken 4/6/2025 0945 by Marguerite Upton RN  Moisture Interventions:   Encourage regular toileting   Incontinence pad  Taken 4/6/2025 0800 by Marguerite Upton RN  Moisture Interventions:   Encourage regular toileting   Incontinence pad   Urinary collection device  Bathing/Skin Care: other (see comments)  Intervention: Plan: Nurse Driven Intervention: Friction and Shear  Recent Flowsheet Documentation  Taken 4/6/2025 0945 by Marguerite Upton RN  Friction/Shear Interventions: Pad bony prominence (elbow pads, heel pads, ear protectors)  Intervention: Optimize Skin Protection  Recent Flowsheet Documentation  Taken 4/6/2025 1237 by Marguerite Upton RN  Head of Bed (HOB) Positioning: other (see comments)  Taken 4/6/2025 0945 by Marguerite Upton RN  Activity Management: activity adjusted per tolerance  Head of Bed (HOB) Positioning: HOB at 30-45 degrees  Taken 4/6/2025 0837 by Marguerite Upton RN  Head of Bed (HOB) Positioning: HOB at 30-45 degrees   Goal Outcome Evaluation:        Pt alert & oriented x4. VS stable on 3 L NC. Pt denies SOB or difficulty breathing. Denies pain. LLE warm to touch but not notably different in comparison to the RLE. Up in the chair for half of shift. Wound on back of thigh LLE cleansed & dressing changed.

## 2025-04-06 NOTE — PROGRESS NOTES
Patient placed on 3L bleed in hospital unit. No adverse reactions or complications. RT will follow.    Soto Valero, RT

## 2025-04-07 VITALS
SYSTOLIC BLOOD PRESSURE: 109 MMHG | DIASTOLIC BLOOD PRESSURE: 67 MMHG | TEMPERATURE: 97.7 F | OXYGEN SATURATION: 99 % | HEIGHT: 64 IN | HEART RATE: 64 BPM | WEIGHT: 272.05 LBS | RESPIRATION RATE: 16 BRPM | BODY MASS INDEX: 46.45 KG/M2

## 2025-04-07 LAB
ANION GAP SERPL CALCULATED.3IONS-SCNC: 6 MMOL/L (ref 7–15)
BACTERIA BLD CULT: NO GROWTH
BACTERIA BLD CULT: NO GROWTH
BUN SERPL-MCNC: 15.7 MG/DL (ref 6–20)
CALCIUM SERPL-MCNC: 9.5 MG/DL (ref 8.8–10.4)
CHLORIDE SERPL-SCNC: 104 MMOL/L (ref 98–107)
CREAT SERPL-MCNC: 0.86 MG/DL (ref 0.51–0.95)
EGFRCR SERPLBLD CKD-EPI 2021: 82 ML/MIN/1.73M2
GLUCOSE SERPL-MCNC: 91 MG/DL (ref 70–99)
HCO3 SERPL-SCNC: 29 MMOL/L (ref 22–29)
POTASSIUM SERPL-SCNC: 4.1 MMOL/L (ref 3.4–5.3)
SODIUM SERPL-SCNC: 139 MMOL/L (ref 135–145)

## 2025-04-07 PROCEDURE — 82310 ASSAY OF CALCIUM: CPT | Performed by: INTERNAL MEDICINE

## 2025-04-07 PROCEDURE — 250N000011 HC RX IP 250 OP 636: Performed by: HOSPITALIST

## 2025-04-07 PROCEDURE — 94660 CPAP INITIATION&MGMT: CPT

## 2025-04-07 PROCEDURE — 999N000157 HC STATISTIC RCP TIME EA 10 MIN

## 2025-04-07 PROCEDURE — 250N000013 HC RX MED GY IP 250 OP 250 PS 637: Performed by: INTERNAL MEDICINE

## 2025-04-07 PROCEDURE — 36415 COLL VENOUS BLD VENIPUNCTURE: CPT | Performed by: INTERNAL MEDICINE

## 2025-04-07 PROCEDURE — 80048 BASIC METABOLIC PNL TOTAL CA: CPT | Performed by: INTERNAL MEDICINE

## 2025-04-07 PROCEDURE — 99239 HOSP IP/OBS DSCHRG MGMT >30: CPT | Performed by: INTERNAL MEDICINE

## 2025-04-07 PROCEDURE — 250N000013 HC RX MED GY IP 250 OP 250 PS 637: Performed by: HOSPITALIST

## 2025-04-07 RX ORDER — LACTOBACILLUS RHAMNOSUS GG 10B CELL
1 CAPSULE ORAL 2 TIMES DAILY
Status: SHIPPED
Start: 2025-04-07 | End: 2025-04-21

## 2025-04-07 RX ORDER — LACTOBACILLUS RHAMNOSUS GG 10B CELL
1 CAPSULE ORAL 2 TIMES DAILY
Status: DISCONTINUED | OUTPATIENT
Start: 2025-04-07 | End: 2025-04-07 | Stop reason: HOSPADM

## 2025-04-07 RX ADMIN — POTASSIUM CHLORIDE 30 MEQ: 1500 TABLET, EXTENDED RELEASE ORAL at 09:49

## 2025-04-07 RX ADMIN — APIXABAN 5 MG: 5 TABLET, FILM COATED ORAL at 09:48

## 2025-04-07 RX ADMIN — Medication 1 CAPSULE: at 10:51

## 2025-04-07 RX ADMIN — PIPERACILLIN AND TAZOBACTAM 3.38 G: 3; .375 INJECTION, POWDER, FOR SOLUTION INTRAVENOUS at 00:27

## 2025-04-07 RX ADMIN — ASPIRIN 81 MG: 81 TABLET, COATED ORAL at 09:48

## 2025-04-07 RX ADMIN — ESCITALOPRAM OXALATE 10 MG: 10 TABLET ORAL at 09:47

## 2025-04-07 RX ADMIN — EMPAGLIFLOZIN 10 MG: 10 TABLET, FILM COATED ORAL at 09:49

## 2025-04-07 RX ADMIN — PIPERACILLIN AND TAZOBACTAM 3.38 G: 3; .375 INJECTION, POWDER, FOR SOLUTION INTRAVENOUS at 09:46

## 2025-04-07 RX ADMIN — SPIRONOLACTONE 12.5 MG: 25 TABLET, FILM COATED ORAL at 09:47

## 2025-04-07 ASSESSMENT — ACTIVITIES OF DAILY LIVING (ADL)
ADLS_ACUITY_SCORE: 64

## 2025-04-07 NOTE — PROGRESS NOTES
Physical Therapy Discharge Summary    Reason for therapy discharge:    Discharged to home with home care.    Progress towards therapy goal(s). See goals on Care Plan in [facility] electronic health record for goal details.  Goals partially met.  Barriers to achieving goals:   discharge from facility.    Therapy recommendation(s):    Further recommended therapy is related to documented deficits, and is necessary to maximize functional independence in order for patient to return to prior level of function.      Liv Carey, KIN 4/7/2025

## 2025-04-07 NOTE — PLAN OF CARE
Occupational Therapy Discharge Summary    Reason for therapy discharge:    Discharged to home with home therapy.    Progress towards therapy goal(s). See goals on Care Plan in [facility] electronic health record for goal details.  Goals met    Therapy recommendation(s):    Continued therapy is recommended.  Rationale/Recommendations:  homecare OT.      Jen GONZALES, OTR/L, CLT 4/7/2025 , 3:43 PM

## 2025-04-07 NOTE — PROGRESS NOTES
Care Management Discharge Note    Discharge Date: 04/07/2025       Discharge Disposition: Home, Home Care - [facility] for RN/PT/HHA/MSW    Discharge Services: Home Care - [facility] for RN/PT/HHA/MSW    Discharge DME: None    Discharge Transportation: health plan transportation    Private pay costs discussed: [employer] Transport WC ride with oxygen between [id number].     Does the patient's insurance plan have a 3 day qualifying hospital stay waiver?  No    PAS Confirmation Code: NA  Patient/family educated on Medicare website which has current facility and service quality ratings: yes    Education Provided on the Discharge Plan: Yes per team  Persons Notified of Discharge Plans: Patient  Patient/Family in Agreement with the Plan: yes    Handoff Referral Completed: No, handoff not indicated or clinically appropriate    Additional Information:  Patient discharge to home with Home Care - [facility] for RN/PT/HHA/MSW via [employer] to WC ride with oxygen between [id number].     [facility]

## 2025-04-07 NOTE — DISCHARGE SUMMARY
"[facility]  Hospitalist Discharge Summary    Date of Admission:  4/2/2025  Date of Discharge:  4/7/2025  Discharging Provider: Ligia James MD  Discharge Service: Hospitalist Service  Discharge Diagnoses   Recurrent cellulitis of the left lower leg  Left leg DVT  Anticoagulated on Coumadin  Chronic leg lymphedema  Chronic respiratory failure with hypoxia on 3 LPM  Morbid obesity Body mass index is 46.7 kg/m .  Hypoventilation syndrome  Pulmonary hypertension  Chronic diastolic CHF  MDD  HLD    Clinically Significant Risk Factors   # Severe Obesity: Estimated body mass index is 46.7 kg/m  as calculated from the following:    Height as of this encounter: 1.626 m (5' 4\").    Weight as of this encounter: 123.4 kg (272 lb 0.8 oz).       Follow-ups Needed After Discharge   Follow-up Appointments       Hospital Follow-up with Existing Primary Care Provider (PCP)        Schedule Primary Care visit within: 7 Days        Unresulted Labs Ordered in the Past 30 Days of this Admission       Date and Time Order Name Status Description    4/2/2025  3:25 PM Blood Culture Line, venous Preliminary [id number]    4/2/2025  3:25 PM Blood Culture Peripheral Blood Preliminary NGTD     Discharge Disposition   Discharged to home with Home health care for RN, PT, OT  Condition at discharge: Stable    Hospital Course   Patient is a 49-year-old female with MS, chronic hypoxic respiratory failure on 3 LPM O2, obesity hypoventilation syndrome, hypertension, multiple sclerosis, homebound, recent LLE DVT, recent hospitalization for recurrent UTIs E. coli sepsis, and chronic BLE lymphedema who presents to the ED with fever, redness and swelling of the left thigh with associated fever.    # Cellulitis of left leg in the setting of chronic leg lymphedema.  #Recent DVT, LLE anticoagulated on Eliquis.  #Chronic lymphedema  -LLE doppler 3/23/2025: Short segment of vein in deep soft tissues mid left thigh appears " noncompressible, corresponds to age-indeterminate occlusive thrombus within L greater saphenous vein.  -BCx NGTD, WOC, lymphedema therapy  -Continue Zosyn, transition to Augmentin at discharge.  History of antibiotic induced diarrhea.  Recommended increase intake of probiotics.  - MRSA swab negative, discontinued vancomycin.  -Continued PTA Eliquis    # Resolved UTI.   UA turbid, with positive leukoesterase, pyuria, negative nitrates.  Culture without growth.  - Recent hospitalization for E. coli sepsis due to cystitis.  Blood cultures were negative.  - On Zosyn as above.  History of Enterococcus bacteremia.    #Chronic respiratory failure with hypoxia 3 LPM at home.  Same oxygen requirements during hospitalization.  #Morbid obesity, Body mass index is 46.7 kg/m .  #Obesity hypoventilation syndrome.  #Pulmonary hypertension  #Chronic systolic CHF without exacerbation  -Continued PTA ASA, bumex, spironolactone, Jardiance, statin  - On 3 LPM, which is her baseline    #Diastolic CHF, chronic.  #MDD-on Lexapro.  #HLD-on simvastatin.    Consultations This Hospital Stay   PHARMACY TO DOSE VANCO  WOUND OSTOMY CONTINENCE NURSE  IP CONSULT  LYMPHEDEMA THERAPY IP CONSULT  CARE MANAGEMENT / SOCIAL WORK IP CONSULT  PHYSICAL THERAPY ADULT IP CONSULT  OCCUPATIONAL THERAPY ADULT IP CONSULT    Code Status   Full Code    Time Spent on this Encounter   I, Ligia James MD, personally saw the patient today and spent greater than 30 minutes discharging this patient.    Ligia James MD  [address] [address]  Phone: [phone]  Fax: [phone]  ______________________________________________________________________    Physical Exam   Vital Signs: Temp: 97.7  F (36.5  C) Temp src: Axillary BP: 109/67 Pulse: 64   Resp: 16 SpO2: 99 % O2 Device: BiPAP/CPAP Oxygen Delivery: 3 LPM  Weight: 272 lbs .76 oz  General: Alert and oriented x 3. Not in obvious distress.  HEENT:  NC, AT. Neck- supple, No JVP elevation, lymphadenopathy or thyromegaly. Trachea-central.  Chest: Clear to auscultation bilaterally.  Heart: S1S2 regular. No M/R/G.  Abdomen: Soft. NT, ND. No organomegaly. Bowel sounds- active.  Back: No spine tenderness. No CVA tenderness.  Extremities: Bilateral leg lymphedema, more prominent on the left.  Wearing compression wrap on the left.  Some skin warmth on the left inner thigh.  Neuro: Cranial nerves 1-12 grossly normal. No focal neurological deficit       Primary Care Physician   Mikala Luna    Discharge Orders      Primary Care - Care Coordination Referral      Home Care Referral      Reason for your hospital stay    Left leg cellulitis     Activity    Your activity upon discharge: activity as tolerated     Oxygen Adult/Peds    Oxygen Documentation  I certify that this patient, Bess Enamorado has been under my care (or a nurse practitioner or physican's assistant working with me). This is the face-to-face encounter for oxygen medical necessity.      At the time of this encounter, I have reviewed the qualifying testing and have determined that supplemental oxygen is reasonable and necessary and is expected to improve the patient's condition in a home setting.         Patient has continued oxygen desaturation due to Chronic Respiratory Failure with Hypoxia J96.11.    If portability is ordered, is the patient mobile within the home? yes    Was this visit performed as a telehealth visit: No     Diet    Follow this diet upon discharge: Current Diet:Orders Placed This Encounter      Fluid restriction 1800 ML FLUID      2 Gram Sodium Diet     Hospital Follow-up with Existing Primary Care Provider (PCP)          Significant Results and Procedures   Results for orders placed or performed during the hospital encounter of 03/22/25   CT Abdomen Pelvis w Contrast    Narrative    EXAM: CT ABDOMEN PELVIS W CONTRAST  LOCATION: [facility]  DATE:  3/22/2025  INDICATION: Abdominal pain, bloating, history of small bowel obstruction  COMPARISON: 7/18/2024  TECHNIQUE: CT scan of the abdomen and pelvis was performed following injection of IV contrast. Multiplanar reformats were obtained. Dose reduction techniques were used.  CONTRAST: 90ml isovue 370  FINDINGS:   LOWER CHEST: Unremarkable.  HEPATOBILIARY: Mildly lobulated hepatic surface contour. Status post cholecystectomy. No biliary dilatation.  PANCREAS: Normal.  SPLEEN: Normal.  ADRENAL GLANDS: Normal.  KIDNEYS/BLADDER: Few nonobstructing intrarenal calculi bilaterally measuring up to 0.3 cm. No hydronephrosis. Urinary bladder appears normal.  BOWEL: Moderate amount of stool throughout the colon. No obstruction or inflammatory change. Normal appendix. No evidence of acute appendicitis.  LYMPH NODES: Unchanged prominent but nonenlarged retroperitoneal lymph nodes. No new lymphadenopathy identified.  VASCULATURE: Mild atherosclerotic calcifications.  PELVIC ORGANS: Redemonstrated right ovarian dermoid cyst measuring 9.0 x 7.9 cm. Uterus and left adnexa appear unremarkable.  MUSCULOSKELETAL: Multilevel degenerative changes of the spine. Grade 1 anterolisthesis of L4 on L5.      Impression    IMPRESSION:   1.  No acute findings or inflammatory changes in the abdomen or pelvis.  2.  Mildly lobulated hepatic surface contour, suspicious for chronic hepatocellular disease.   XR Chest 1 View    Narrative    EXAM: XR CHEST 1 VIEW  LOCATION: [facility]  DATE: 3/23/2025  INDICATION: Dyspnea  COMPARISON: CT chest 2/11/2025. Chest radiographs 7/22/2024.      Impression    IMPRESSION: Somewhat low lung volumes. Elevation of the right hemidiaphragm. Enlarged heart. Prominent pulmonary vasculature suggestive of congestion. No focal lung consolidation, pleural effusion or pneumothorax.   US Lower Extremity Venous Duplex Bilateral    Narrative    EXAM: ULTRASOUND VENOUS BILATERAL LOWER EXTREMITIES WITH  DOPPLER  LOCATION: [facility]  DATE/TIME: 03/23/2025, 6:41 AM CDT  INDICATION: Swelling.  COMPARISON: None.  TECHNIQUE: Venous Duplex ultrasound of bilateral lower extremities with and without compression, augmentation and duplex. Color flow and spectral Doppler with waveform analysis performed.  FINDINGS: Exam includes the common femoral, femoral, popliteal veins as well as segmentally visualized deep calf veins and greater saphenous vein. This study is overall moderately limited due to large body habitus. Note that the evaluation of the calf   veins is prohibiting due to suboptimal visualization.  RIGHT: Normal compressibility of the common femoral, femoral, popliteal and great saphenous veins. Unremarkable Doppler waveform evaluation of the common femoral, femoral and popliteal veins.  LEFT: A short segment of a vein in the deep soft tissues of the mid left thigh appears noncompressible, suggesting occlusive thrombus. Normal compressibility of the common femoral, femoral and popliteal veins. Unremarkable Doppler waveform evaluation of   the common femoral, femoral and popliteal veins. 2.3 x 1.5 x 1.0 cm avascular hypoechoic structure in the deep soft tissues of the mid left thigh. This is nonspecific, could represent a small lymph node or cyst.      Impression    IMPRESSION:   1.  A short segment of a vein in the deep soft tissues of the mid left thigh appears noncompressible. This likely corresponds to a segment of age-indeterminate occlusive thrombus within the left greater saphenous vein.  2.  Note that this study is overall moderately limited due to large body habitus. The calf veins were not able to be adequately evaluated. Otherwise, no convincing evidence of thrombus in the deep veins of the visualized portions of bilateral lower   extremities.       *Note: Due to a large number of results and/or encounters for the requested time period, some results have not been displayed. A  complete set of results can be found in Results Review.     Discharge Medications   Current Discharge Medication List        START taking these medications    Details   amoxicillin-clavulanate (AUGMENTIN) 875-125 MG tablet Take 1 tablet by mouth 2 times daily for 7 days.  Qty: 14 tablet, Refills: 0    Associated Diagnoses: Cellulitis of left lower extremity; Sepsis due to cellulitis (H)      lactobacillus rhamnosus, GG, (CULTURELL) capsule Take 1 capsule by mouth 2 times daily for 14 days.    Associated Diagnoses: Antibiotic-associated diarrhea           CONTINUE these medications which have NOT CHANGED    Details   acetaminophen (TYLENOL) 650 MG CR tablet Take 1,300 mg by mouth every 8 hours as needed for mild pain or fever.      apixaban ANTICOAGULANT (ELIQUIS) 5 MG tablet Take 1 tablet (5 mg) by mouth 2 times daily.  Qty: 90 tablet, Refills: 3    Associated Diagnoses: Acute deep vein thrombosis (DVT) of other specified vein of left lower extremity (H)      aspirin 81 MG EC tablet Take 81 mg by mouth every morning.      bumetanide (BUMEX) 2 MG tablet Take 6 mg by mouth every morning.      Emollient (GOLD BOND MEDICATED BODY EX) Externally apply 1 Application topically 2 times daily as needed      empagliflozin (JARDIANCE) 10 MG TABS tablet Take 10 mg by mouth every morning.      escitalopram (LEXAPRO) 10 MG tablet Take 10 mg by mouth every morning.      miconazole (MICATIN) 2 % external powder Apply topically 2 times daily as needed for itching or other.      potassium chloride sheila ER (KLOR-CON M10) 10 MEQ CR tablet Take 30 mEq by mouth every morning.      sennosides (SENOKOT) 8.6 MG tablet Take 1 tablet by mouth 2 times daily as needed for constipation.  Qty: 20 tablet, Refills: 0      simvastatin (ZOCOR) 10 MG tablet Take 1 tablet (10 mg) by mouth at bedtime.  Qty: 90 tablet, Refills: 3    Associated Diagnoses: Ischemic cerebrovascular accident (CVA) (H)      spironolactone (ALDACTONE) 25 MG tablet Take 12.5 mg  by mouth every morning.           Allergies   No Known Allergies  This document is created with the help of Dragon dictation system. All grammatical errors are unintentional.

## 2025-04-07 NOTE — PLAN OF CARE
"Goal Outcome Evaluation:  Patient alert and oriented x4. Vital signs stable on room air. Using CPAP at bedtime.     Patient has small skin tear on upper left thigh near perineum, removed brief in bed. Patient does not want to stop using purwick as they are unable to quickly get to commode - stated they know when they need to urinate but \"I don't get much warning\".     Patient on 2g sodium diet. 1800ml fluid restriction with excellent compliance. Patient in med during shift, able to log roll well. Uses assist of 1 with walker to get up to the chair.       Problem: Pain Acute  Goal: Optimal Pain Control and Function  Outcome: Progressing  Intervention: Optimize Psychosocial Wellbeing  Recent Flowsheet Documentation  Taken 4/6/2025 1700 by Becky Randle RN  Diversional Activities:   reading   television   computer  Intervention: Prevent or Manage Pain  Recent Flowsheet Documentation  Taken 4/6/2025 1700 by Becky Randle RN  Medication Review/Management:   high-risk medications identified   medications reviewed     Problem: Heart Failure  Goal: Effective Oxygenation and Ventilation  Outcome: Progressing  Intervention: Promote Airway Secretion Clearance  Recent Flowsheet Documentation  Taken 4/6/2025 1700 by Becky Randle RN  Cough And Deep Breathing: done independently per patient  Intervention: Optimize Oxygenation and Ventilation  Recent Flowsheet Documentation  Taken 4/6/2025 1826 by Becky Randle RN  Head of Bed (HOB) Positioning: HOB at 30-45 degrees     Problem: Comorbidity Management  Goal: Blood Pressure in Desired Range  Outcome: Progressing  Intervention: Maintain Blood Pressure Management  Recent Flowsheet Documentation  Taken 4/6/2025 1700 by Becky Randle RN  Medication Review/Management:   high-risk medications identified   medications reviewed     Problem: Skin or Soft Tissue Infection  Goal: Absence of Infection Signs and Symptoms  Outcome: Progressing     Problem: Fall Injury " Risk  Goal: Absence of Fall and Fall-Related Injury  Outcome: Progressing  Intervention: Identify and Manage Contributors  Recent Flowsheet Documentation  Taken 4/6/2025 1700 by Becky Randle, RN  Medication Review/Management:   high-risk medications identified   medications reviewed  Intervention: Promote Injury-Free Environment  Recent Flowsheet Documentation  Taken 4/6/2025 1700 by Becky Randle, RN  Safety Promotion/Fall Prevention:   assistive device/personal items within reach   clutter free environment maintained   nonskid shoes/slippers when out of bed   patient and family education   safety round/check completed

## 2025-04-07 NOTE — PROGRESS NOTES
Care Management Follow Up    Length of Stay (days): 5    Expected Discharge Date: 04/07/2025     Concerns to be Addressed:     Care progression - discharge planning  Patient plan of care discussed at interdisciplinary rounds: Yes    Anticipated Discharge Disposition:  PT rec home with assist;home with home care physical therapy and OT rec other (see comments)      Anticipated Discharge Services:  Home with home care - [facility] for RN/PT/HHA/MSW  Anticipated Discharge DME:  NA    Patient/family educated on Medicare website which has current facility and service quality ratings:  NA  Education Provided on the Discharge Plan:  Yes per team  Patient/Family in Agreement with the Plan:  Yes    Referrals Placed by CM/SW:  Yes  Private pay costs discussed: transportation costs    Discussed  Partnership in Safe Discharge Planning  document with patient/family: No     Handoff Completed: No, handoff not indicated or clinically appropriate    Additional Information:  1035 rec'd a call from bedside nurse, Finesse ORONA, patient requesting a WC ride back home today. Will need oxygen.     Spoke with patient regarding the above and patient confirmed.  Discussed out of pocket cost of [facility] medical transportation by wheelchair with patient. Patient agreed with the plan to have transportation arranged by [employer] transport. .    Called  [facility] to request a WC ride with oxygen between [phone].    Next Steps: RNCM to follow for medical progression, recommendations, and final discharge plan.     Amina Hernandez RN

## 2025-04-07 NOTE — PLAN OF CARE
"  Problem: Adult Inpatient Plan of Care  Goal: Plan of Care Review  Description: The Plan of Care Review/Shift note should be completed every shift.  The Outcome Evaluation is a brief statement about your assessment that the patient is improving, declining, or no change.  This information will be displayed automatically on your shiftnote.  Outcome: Met     Problem: Adult Inpatient Plan of Care  Goal: Patient-Specific Goal (Individualized)  Description: You can add care plan individualizations to a care plan. Examples of Individualization might be:  \"Parent requests to be called daily at 9am for status\", \"I have a hard time hearing out of my right ear\", or \"Do not touch me to wake me up as it startlesme\".  Outcome: Met     Problem: Adult Inpatient Plan of Care  Goal: Absence of Hospital-Acquired Illness or Injury  Intervention: Identify and Manage Fall Risk  Recent Flowsheet Documentation  Taken 4/7/2025 1400 by Finesse Alvarez, RN  Safety Promotion/Fall Prevention:   activity supervised   assistive device/personal items within reach     Problem: Adult Inpatient Plan of Care  Goal: Absence of Hospital-Acquired Illness or Injury  Intervention: Prevent Skin Injury  Recent Flowsheet Documentation  Taken 4/7/2025 1400 by Finesse Alvarez, RN  Body Position: position changed independently  Taken 4/7/2025 1237 by Finesse Alvarez, RN  Body Position: sitting up in bed  Taken 4/7/2025 0837 by Finesse Alvarez, RN  Body Position: sitting up in bed   Goal Outcome Evaluation:  Patient alert oriented x 4,able to verbalize needs, wound change today, NC 3 L O2, will discharge today with belongings at 1500, W/C transport.                      "

## 2025-04-07 NOTE — PLAN OF CARE
Problem: Pain Acute  Goal: Optimal Pain Control and Function  Outcome: Progressing  Intervention: Prevent or Manage Pain  Recent Flowsheet Documentation  Taken 4/7/2025 0027 by Klarissa Caputo, RN  Medication Review/Management:   high-risk medications identified   medications reviewed     Problem: Infection  Goal: Absence of Infection Signs and Symptoms  Outcome: Progressing     Goal Outcome Evaluation:         Denied any pain. Vitals stable. Using CPAP at night with 3L of oxygen bled into it. Getting IV zosyn. Chronic lymphedema. Possible discharge home today with home care.

## 2025-04-08 ENCOUNTER — PATIENT OUTREACH (OUTPATIENT)
Dept: CARE COORDINATION | Facility: CLINIC | Age: 50
End: 2025-04-08

## 2025-04-08 ENCOUNTER — VIRTUAL VISIT (OUTPATIENT)
Dept: FAMILY MEDICINE | Facility: CLINIC | Age: 50
End: 2025-04-08
Attending: INTERNAL MEDICINE
Payer: COMMERCIAL

## 2025-04-08 DIAGNOSIS — J96.11 CHRONIC RESPIRATORY FAILURE WITH HYPOXIA (H): ICD-10-CM

## 2025-04-08 DIAGNOSIS — L03.116 CELLULITIS OF LEFT LEG: ICD-10-CM

## 2025-04-08 DIAGNOSIS — N92.0 MENORRHAGIA WITH REGULAR CYCLE: Primary | ICD-10-CM

## 2025-04-08 PROCEDURE — 98005 SYNCH AUDIO-VIDEO EST LOW 20: CPT | Performed by: FAMILY MEDICINE

## 2025-04-08 PROCEDURE — 1111F DSCHRG MED/CURRENT MED MERGE: CPT | Performed by: FAMILY MEDICINE

## 2025-04-08 NOTE — PROGRESS NOTES
Clinic Care Coordination Contact  Care Coordination Clinician Chart Review    Situation: Patient chart reviewed by care coordinator.    Background: Clinic Care Coordination Referral received from inpatient care team for transition handoff communication following hospital admission.    Assessment: Upon chart review, patient is not a candidate for Primary Care Clinic Care Coordination enrollment due to reason stated below:  Patient is receiving duplicative services from PCP Hospital follow up video visit today . and has [facility] services     Plan/Recommendations: Clinic Care Coordination Referral/order cancelled. RN/SW CC will perform no further monitoring/outreaches at this time and will remain available as needed. If new needs arise, a new Care Coordination Referral may be placed.    [facility]   Meenakshi Campbell RN, Care Coordinator   [facility]   E-mail [email or web address]   [phone]

## 2025-04-08 NOTE — PROGRESS NOTES
"Bess is a 49 year old who is being evaluated via a billable video visit.    How would you like to obtain your AVS? MyChart  If the video visit is dropped, the invitation should be resent by: Send to e-mail at: [email or web address]  Will anyone else be joining your video visit? No      Assessment & Plan     Menorrhagia with regular cycle  Periods much worse with some clotting on the Eliquis.    We discussed the need to look at uterus and r/o endometrial hyperplasia, etc      Cellulitis of left leg  On Augmentin.  Plans to start probiotic given h/o antibiotic associated diarrhea      Chronic respiratory failure with hypoxia (H)  On home oxygen.   stable        MED REC REQUIRED  Post Medication Reconciliation Status: discharge medications reconciled, continue medications without change  BMI  Estimated body mass index is 46.7 kg/m  as calculated from the following:    Height as of 4/3/25: 1.626 m (5' 4\").    Weight as of 4/3/25: 123.4 kg (272 lb 0.8 oz).             Subjective   Bess is a 49 year old, presenting for the following health issues:  No chief complaint on file.        4/8/2025     6:48 AM   Additional Questions   Roomed by Vero REAL CMA   Accompanied by Self       Video Start Time:  0700    HPI          3/30/2025   Post Discharge Outreach   How are you doing now that you are home? \"I'm doing well\"   How are your symptoms? (Red Flag symptoms escalate to triage hotline per guidelines) Improved   Does the patient have their discharge instructions?  Yes   Does the patient have questions regarding their discharge instructions?  No   Were you started on any new medications or were there changes to any of your previous medications?  Yes   Does the patient have all of their medications? Yes   Do you have questions regarding any of your medications?  No   Do you have all of your needed medical supplies or equipment (DME)?  (i.e. oxygen tank, CPAP, cane, etc.) Yes   Discharge Follow Up Appointment Date 4/1/2025 " "  Discharge Follow Up Appointment Scheduled with? Primary Care Provider       Hospital Follow-up Visit:    Hospital/Nursing Home/IP Rehab Facility: [facility]  Most Recent Admission Date: 4/2/2025   Most Recent Admission Diagnosis: Multiple sclerosis (H) - G35  Lymphedema - I89.0  Left leg cellulitis - L03.116     Most Recent Discharge Date: 4/7/2025   Most Recent Discharge Diagnosis: Left leg cellulitis - L03.116  Multiple sclerosis (H) - G35  Lymphedema - I89.0  Cellulitis of left lower extremity - L03.116  Sepsis due to cellulitis (H) - L03.90, A41.9  Acute and chronic respiratory failure with hypoxia (H) - J96.21  Antibiotic-associated diarrhea - K52.1, T36.95XA   Was the patient in the ICU or did the patient experience delirium during hospitalization?  No  Do you have any other stressors you would like to discuss with your provider? No    Problems taking medications regularly:  None  Medication changes since discharge: Augmentin 875-125mg bid x7 days, culturell bid  Problems adhering to non-medication therapy:  None    Summary of hospitalization:  [facility] discharge summary reviewed  Diagnostic Tests/Treatments reviewed.  Follow up needed: none  Other Healthcare Providers Involved in Patient s Care:         Homecare - [facility] care  Update since discharge: Wound is healing well, no bleeding. No fevers. Area is warm to the touch and painful.        No redness  No fever  Took first oral dose of Augmentin yesterday evening    Didn't take bumex in the hospital for a few days because her blood pressure was on the low side.   Breathing is \"not worse\" without the bumex  Weight 271 on discharge  Last week when she saw me 254.   Some fluctuation is normal for her.     Period is quite heavy on the blood thinner.  Period started Friday 4/4 - passing a few clots larger than quarter sized. Had still been getting periods but they were quite light until the blood thinner started. "           Wt Readings from Last 5 Encounters:   04/03/25 123.4 kg (272 lb 0.8 oz)   04/01/25 115.2 kg (254 lb)   03/28/25 118.8 kg (262 lb)   03/17/25 122 kg (269 lb)   02/25/25 124.8 kg (275 lb 1.6 oz)         Plan of care communicated with patient           Hospital Course  Patient is a 49-year-old female with MS, chronic hypoxic respiratory failure on 3 LPM O2, obesity hypoventilation syndrome, hypertension, multiple sclerosis, homebound, recent LLE DVT, recent hospitalization for recurrent UTIs E. coli sepsis, and chronic BLE lymphedema who presents to the ED with fever, redness and swelling of the left thigh with associated fever.     # Cellulitis of left leg in the setting of chronic leg lymphedema.  #Recent DVT, LLE anticoagulated on Eliquis.  #Chronic lymphedema  -LLE doppler 3/23/2025: Short segment of vein in deep soft tissues mid left thigh appears noncompressible, corresponds to age-indeterminate occlusive thrombus within L greater saphenous vein.  -BCx NGTD, WOC, lymphedema therapy  -Continue Zosyn, transition to Augmentin at discharge.  History of antibiotic induced diarrhea.  Recommended increase intake of probiotics.  - MRSA swab negative, discontinued vancomycin.  -Continued PTA Eliquis     # Resolved UTI.   UA turbid, with positive leukoesterase, pyuria, negative nitrates.  Culture without growth.  - Recent hospitalization for E. coli sepsis due to cystitis.  Blood cultures were negative.  - On Zosyn as above.  History of Enterococcus bacteremia.     #Chronic respiratory failure with hypoxia 3 LPM at home.  Same oxygen requirements during hospitalization.  #Morbid obesity, Body mass index is 46.7 kg/m .  #Obesity hypoventilation syndrome.  #Pulmonary hypertension  #Chronic systolic CHF without exacerbation  -Continued PTA ASA, bumex, spironolactone, Jardiance, statin  - On 3 LPM, which is her baseline     #Diastolic CHF, chronic.  #MDD-on Lexapro.  #HLD-on simvastatin.        Review of  Systems  Constitutional, HEENT, cardiovascular, pulmonary, gi and gu systems are negative, except as otherwise noted.      Objective           Vitals:  No vitals were obtained today due to virtual visit.    Physical Exam   GENERAL: alert and no distress  EYES: Eyes grossly normal to inspection.  No discharge or erythema, or obvious scleral/conjunctival abnormalities.  RESP: No audible wheeze, cough, or visible cyanosis.    SKIN: Visible skin clear. No significant rash, abnormal pigmentation or lesions.  NEURO: Cranial nerves grossly intact.  Mentation and speech appropriate for age.  PSYCH: Appropriate affect, tone, and pace of words          Video-Visit Details    Type of service:  Video Visit   Video End Time:7:14 AM  Originating Location (pt. Location): Home    Distant Location (provider location):  On-site  Platform used for Video Visit: Moisés  Signed Electronically by: Mikala Luna MD

## 2025-04-09 ENCOUNTER — TELEPHONE (OUTPATIENT)
Dept: FAMILY MEDICINE | Facility: CLINIC | Age: 50
End: 2025-04-09
Payer: COMMERCIAL

## 2025-04-09 NOTE — TELEPHONE ENCOUNTER
Agree with requested orders    Okay to reduce wound care frequency.  I should be notified if wound is worsening or she should be directed to ER if wound worsens.    Mikala Luna M.D.

## 2025-04-09 NOTE — TELEPHONE ENCOUNTER
Called and left detailed message for verbal orders on confidential voice mail and to please send the orders by fax to sign.    Isabella Salinas RN on 4/9/2025 at 3:59 PM

## 2025-04-09 NOTE — TELEPHONE ENCOUNTER
Home Care is calling regarding an established patient with [facility].  Requesting orders from: Mikala Luna  PROVIDER AUTHORIZATION REQUIRED: RN unable to provide verbal approval for all orders.  See below for additional information.  RN will contact Home care with information after provider review.  Is this a request for a temporary pause in the home care episode?  No    Orders Requested    Skilled Nursing  Request for resumption in care.   1 more time this week and 3 x week for 2 weeks and then will need to re certification.  RN gave verbal order: Yes      Physical Therapy  Request for initial evaluation and treatment (one time)   RN gave verbal order: Yes      Home care RN needs to decrease wound care to 3 x week, discharge papers instruct wound care daily. The homecare is not able to provider daily wound care.  The patient is not physically able to do wound care.     Phone number Home Care can be reached at: [phone]  Okay to leave a detailed message?: Yes    Contacts       Contact Date/Time Type Contact Phone/Fax    04/09/2025 03:19 PM CDT Phone (Incoming) [facility] [phone]          Sonrda Summers RN

## 2025-04-14 ENCOUNTER — TELEPHONE (OUTPATIENT)
Dept: FAMILY MEDICINE | Facility: CLINIC | Age: 50
End: 2025-04-14
Payer: COMMERCIAL

## 2025-04-14 NOTE — PROGRESS NOTES
~Cardiology Clinic Visit~    Primary Cardiologist: Dr. Diaz  Reason for visit: Hospital follow up  CORE return    History of Present Illness    Bess Enamorado is a very pleasant 49 year old female with a past medical history notable for hypertension, history of pulmonary embolism, severe pulmonary hypertension, NARCISA on CPAP, morbid obesity with BMI around 56, chronic HFpEF with primarily right-sided heart failure and RV dysfunction, multiple sclerosis, lymphedema, and binge eating disorder.     Over the years, patient has had progressive RV dilation and dysfunction on her echocardiograms. Her most recent echocardiogram in July of this year showed severe pulmonary hypertension with RVSP 82 mm hg +RA, and IVC diameter >2.1 cm collapsing <50% with sniff suggesting a high RA pressure estimated at 15 mmHg or greater. Her LV function is hyperdynamic.  She has mild mitral stenosis.    I saw her in January 2025, and at that time she appeared volume up with increased weight trend.  Bumex dosing was increased from 4 mg daily to 6 mg daily.  In the interim, she was admitted at [facility] on 2/11/2025 following a fall.  Cardiology was consulted due to worsening shortness of breath and volume overload and as she was found to have acute  on chronic HFpEF/right-sided heart failure exacerbation.  276# at discharge.    She struggles to check weight consistently, has dietary indiscretion.  On 3/3 she reported a weight of 256#, then 265# the following week and 269# on 3/17/25 at clinic.  She is on supplemental O2 at 3L most of the day, CPAP at night.  She reported worsening LE edema with this acute weight gain > gets around with wheelchair, has had weeping edema to her left leg (has wound care services).  With wrapping her legs, she has noticed more abdominal bloating.  Bumex augmented further (up to 8 mg daily).  Discussed infusion clinic as next step if no improvement.    On 3/22/25, she was admitted back to [facility]  with cystitis with severe sepsis, plus CHF exacerbation with leg edema.  Admitting weight 282# (?).  US left leg + for DVT, Eliquis started.  Bess was discharged to home at a weight around 262#.  She went back to ER 4/2/25 for fever, LLE swelling and pain (Hx lymphedema with increased redness and warmth of the extermity).  Dx with cellulitis.  Tx with antibiotics and discharged 4/7.    Renal function and electrolytes from 4/7/25 are stable.    Interval 04/15/25    From a cardiac standpoint, things are stable today.  Weight is controlled.  She noticed a new left flank rash today, this is associated with dull left sided back pain.  The rash is unilateral, erythematous appearing lesions in clusters with closed blisters.  She also has a left abdominal fold that is starting to open.  She treated it with fungal powder, appears to be moisture related skin breakdown.  __________________________________________________________________         Assessment and Impression:     Severe right-sided heart failure with acute on chronic HFpEF  Severe pulmonary hypertension, oxygen dependent on 3 L/min at baseline  Chronic lymphedema  NARCISA on CPAP  Multiple sclerosis  Morbid obesity  History of pulmonary embolism  Skin breakdown  Unilateral left flank clustered rash         Recommendations and Plan:     Recommend urgent care evaluation for rash.  Consider shingles related rash?  Keep cardiac plan the same at this time as she is doing well.  ANNAMARIE CORE visit in 2-months.  __________________________________________________________________    Thank you for the opportunity to participate in this pleasant patient's care.    We would be happy to see this patient sooner for any concerns in the meantime.    JULIETA Anguiano, CNP   Nurse Practitioner  [facility]    Today's clinic visit entailed:  The following tests were independently interpreted by me as noted in my documentation: labs, hospital notes  Prescription  drug management  The level of medical decision making during this visit was of moderate complexity.  Review of the result(s) of each unique test - cardiac testing, cardiac imaging, labs  Care everywhere reviewed for additional records to facilitate comprehensive patient care.  Recent hospitalization records and notes reviewed to facilitate comprehensive care coordination.    Orders this Visit:  Orders Placed This Encounter   Procedures    Follow-Up with Cardiology- ANNAMARIE     No orders of the defined types were placed in this encounter.    There are no discontinued medications.  Encounter Diagnoses   Name Primary?    Chronic heart failure with preserved ejection fraction (H) Yes    Obesity hypoventilation syndrome (H)     Pulmonary hypertension (H)     Chronic right-sided heart failure (H)     NARCISA on CPAP     Class 3 severe obesity due to excess calories with serious comorbidity and body mass index (BMI) of 50.0 to 59.9 in adult (H)      CURRENT MEDICATIONS:  Current Outpatient Medications   Medication Sig Dispense Refill    acetaminophen (TYLENOL) 650 MG CR tablet Take 1,300 mg by mouth every 8 hours as needed for mild pain or fever.      apixaban ANTICOAGULANT (ELIQUIS) 5 MG tablet Take 1 tablet (5 mg) by mouth 2 times daily. 90 tablet 3    aspirin 81 MG EC tablet Take 81 mg by mouth every morning.      bumetanide (BUMEX) 2 MG tablet Take 6 mg by mouth every morning.      Emollient (GOLD BOND MEDICATED BODY EX) Externally apply 1 Application topically 2 times daily as needed      empagliflozin (JARDIANCE) 10 MG TABS tablet Take 10 mg by mouth every morning.      escitalopram (LEXAPRO) 10 MG tablet Take 10 mg by mouth every morning.      lactobacillus rhamnosus, GG, (CULTURELL) capsule Take 1 capsule by mouth 2 times daily for 14 days.      miconazole (MICATIN) 2 % external powder Apply topically 2 times daily as needed for itching or other.      potassium chloride sehila ER (KLOR-CON M10) 10 MEQ CR tablet Take 30 mEq  by mouth every morning.      sennosides (SENOKOT) 8.6 MG tablet Take 1 tablet by mouth 2 times daily as needed for constipation. 20 tablet 0    simvastatin (ZOCOR) 10 MG tablet Take 1 tablet (10 mg) by mouth at bedtime. 90 tablet 3    spironolactone (ALDACTONE) 25 MG tablet Take 12.5 mg by mouth every morning.       ALLERGIES   No Known Allergies  PAST MEDICAL, SURGICAL, FAMILY, SOCIAL HISTORY:  History was reviewed and updated as needed, see medical record.    Review of Systems:  A 10-point Review Of Systems is otherwise normal except for that which is noted in the HPI and interval summary.    Physical Exam:    Vitals: BP 94/61 (BP Location: Right arm, Patient Position: Sitting, Cuff Size: Adult Large)   Pulse 92   Resp 20   Wt 116.9 kg (257 lb 12.8 oz)   LMP 04/08/2025 (Within Days)   SpO2 98%   BMI 44.25 kg/m    Constitutional: Appears stated age, well nourished, NAD.  Respiratory: Non-labored. Lungs clear  Cardiovascular: RRR, normal S1 and S2.  Chronic lymphedema.  GI: Soft, non-distended, non-tender.  Skin: Warm and dry.   Musculoskeletal/Extremities: Symmetrical movement.  Neurologic: No gross focal deficits. Alert, awake.  Psychiatric: Affect appropriate. Mentation normal.    Recent Lab Results:  LIPID RESULTS:  Lab Results   Component Value Date    CHOL 81 02/12/2025    CHOL 162 02/11/2016    HDL 11 (L) 02/12/2025    HDL 64 02/11/2016    LDL 43 02/12/2025    LDL 75 02/11/2016    TRIG 134 02/12/2025    TRIG 113 02/11/2016    CHOLHDLRATIO 4.0 11/07/2011     LIVER ENZYME RESULTS:  Lab Results   Component Value Date    AST 20 04/02/2025    AST 18 03/07/2021    ALT 11 04/02/2025    ALT 17 03/07/2021     CBC RESULTS:  Lab Results   Component Value Date    WBC 10.4 04/03/2025    WBC 7.3 03/07/2021    RBC 4.65 04/03/2025    RBC 4.91 03/07/2021    HGB 13.4 04/03/2025    HGB 12.5 03/07/2021    HCT 42.0 04/03/2025    HCT 42.8 03/07/2021    MCV 90 04/03/2025    MCV 87 03/07/2021    MCH 28.8 04/03/2025    MCH 25.5  (L) 2021    MCHC 31.9 2025    MCHC 29.2 (L) 2021    RDW 17.5 (H) 2025    RDW 15.6 (H) 2021     2025     2021     BMP RESULTS:  Lab Results   Component Value Date     2025     2021    POTASSIUM 4.1 2025    POTASSIUM 3.6 2022    POTASSIUM 4.3 2021    CHLORIDE 104 2025    CHLORIDE 98 2022    CHLORIDE 105 2021    CO2 29 2025    CO2 25 2022    CO2 30 2021    ANIONGAP 6 (L) 2025    ANIONGAP 11 2022    ANIONGAP 4 2021    GLC 91 2025    GLC 81 2025    GLC 94 2022    GLC 99 2021    BUN 15.7 2025    BUN 22 2022    BUN 12 2021    CR 0.86 2025    CR 0.62 2021    GFRESTIMATED 82 2025    GFRESTIMATED >90 2021    GFRESTBLACK >90 2021    EVITA 9.5 2025    EVITA 9.4 2021      A1C RESULTS:  Lab Results   Component Value Date    A1C 5.6 2025    A1C 5.7 (H) 2020     INR RESULTS:  Lab Results   Component Value Date    INR 1.30 (H) 2025    INR 1.28 (H) 2025    INR 0.96 2013       Recent Results (from the past 4320 hours)   Echocardiogram Complete    Narrative    [id number]  [id number]  [id number]  [id number]^[place] [address]     Name: CITLALY MICHEL  MRN: [id number]  : 1975  Study Date: 2025 10:25 AM  Age: 49 yrs  Gender: Female  Patient [facility]  Reason For Study: CHF  Ordering Physician: NINA SORIA  Performed By: REBECCA     BSA: 2.4 m2  Height: 64 in  Weight: 330 lb  HR: 98  BP: 111/66 mmHg  ______________________________________________________________________________  Procedure  Echocardiogram with two-dimensional, color and spectral Doppler. Definity (NDC  #60084-419) given intravenously.  ______________________________________________________________________________  Interpretation  Summary     1. Technically difficult study.  2. The left ventricle is normal in size.Image quality does not provide for  detailed assessment of LV systolic function, but is felt to be normal with a  visually estimated ejection fraction of roughly 65%.  3. There is abnormal septal motion c/w right ventricular overload.  4. Probable mild-moderate tricuspid insufficiency (the color Doppler tricuspid  insufficiency jet is poorly visualized).  5. There is severe right ventricular enlargement with severely reduced right  ventricular systolic performance.  6. The left atrium appears enlarged though difficult to quantify due to  inadequate visualization. There is severe right atrial enlargement.  7. Right ventricular systolic pressure relative to right atrial pressure is  moderate-severely increased. The pulmonary artery pressure is estimated to be  70-75 mmHg plus right atrial pressure. In addition, the IVC appears dilated  with decreased phasic variation in caval diameter consistent with elevated  right atrial pressure.     When compared to the prior real-time echocardiogram dated 19 July 2024, the  pulmonary artery pressure estimate measures slightly less on the current  study. The findings are otherwise felt to be fairly similar on both  examinations (although the right ventricular size was previously described as  only mildly enlarged, it is this reader's impression that right ventricular  size was severely increased on the prior study with concomitant severe  depression and right ventricular systolic performance).  ______________________________________________________________________________  Left ventricle:  The left ventricle is normal in size.Image quality does not provide for  detailed assessment of LV systolic function, but is felt to be normal with a  visually estimated ejection fraction of [id number]%. There is abnormal septal  motion c/w right ventricular overload. Left ventricular wall thickness is  normal.      Assessment of LV Diastolic Function: The cumulative findings are indeterminate  in the evaluation of diastolic function.     Right ventricle:  There is severe right ventricular enlargement with severely reduced right  ventricular systolic performance.     Left atrium:  The left atrium appears enlarged though difficult to quantify due to  inadequate visualization.     Right atrium:  There is severe right atrial enlargement.     IVC:  The IVC appears dilated with decreased phasic variation in caval diameter  consistent with elevated right atrial pressure.     Aortic valve:  The aortic valve is not well visualized, but suspected to be comprised of  three cusps. No significant aortic stenosis or aortic insufficiency is  detected on this study.     Mitral valve:  There is moderate mitral annular calcification. No mitral insufficiency of  significance is appreciated.     Tricuspid valve:  The tricuspid valve is not well-visualized. Probable mild-moderate tricuspid  insufficiency (the color Doppler tricuspid insufficiency jet is poorly  visualized).     Pulmonic valve:  The pulmonic valve is not well-visualized.     Thoracic aorta:  There is borderline enlargement of the proximal ascending aorta.     Pericardium:  There is no significant pericardial effusion.  ______________________________________________________________________________  ______________________________________________________________________________  MMode/2D Measurements & Calculations  Ao root diam: 3.4 cm  asc Aorta Diam: 3.8 cm  LVOT diam: 2.3 cm  LVOT area: 4.1 cm2  Ao root diam index Ht(cm/m): 2.1  Ao root diam index BSA (cm/m2): 1.4  Asc Ao diam index BSA (cm/m2): 1.6  Asc Ao diam index Ht(cm/m): 2.3     Time Measurements  MM HR: 98.0 BPM     Doppler Measurements & Calculations  MV E max kailyn: 65.6 cm/sec  MV A max kailyn: 131.9 cm/sec  MV E/A: 0.50  MV max P.6 mmHg  MV mean PG: 3.7 mmHg  MV V2 VTI: 20.3 cm  MVA(VTI): 3.7 cm2     MV dec slope:  1165 cm/sec2  MV dec time: 0.06 sec  Ao V2 max: 137.7 cm/sec  Ao max P.0 mmHg  Ao V2 mean: 90.7 cm/sec  Ao mean PG: 3.8 mmHg  Ao V2 VTI: 19.4 cm  SHANELL(I,D): 3.8 cm2  SHANELL(V,D): 2.5 cm2  LV V1 max P.7 mmHg  LV V1 max: 82.4 cm/sec  LV V1 VTI: 18.0 cm  SV(LVOT): 74.5 ml  SI(LVOT): 30.8 ml/m2  TR max jim: 429.4 cm/sec  TR max P.7 mmHg  AV Jim Ratio (DI): 0.60  SHANELL Index (cm2/m2): 1.6     ______________________________________________________________________________  Report approved by: Otoniel Nath MD on 2025 01:41 PM

## 2025-04-14 NOTE — TELEPHONE ENCOUNTER
Home Care is calling regarding an established patient with [facility].  Requesting orders from: Mikala Luna  RN APPROVED: RN able to provide verbal orders.  Home Care will send orders for signature.  RN will close encounter.  Is this a request for a temporary pause in the home care episode?  No    Orders Requested    Physical Therapy  Request for initial certification (first set of orders)   Frequency: every other week x 2 weeks  RN gave verbal order: Yes        Phone number Home Care can be reached at: [phone] Belle Ellisay to leave a detailed message?: Yes      Bam Merritt RN

## 2025-04-15 ENCOUNTER — OFFICE VISIT (OUTPATIENT)
Dept: CARDIOLOGY | Facility: CLINIC | Age: 50
End: 2025-04-15
Attending: NURSE PRACTITIONER
Payer: COMMERCIAL

## 2025-04-15 ENCOUNTER — HOSPITAL ENCOUNTER (EMERGENCY)
Facility: CLINIC | Age: 50
Discharge: HOME | End: 2025-04-15
Attending: PHYSICIAN ASSISTANT | Admitting: PHYSICIAN ASSISTANT
Payer: COMMERCIAL

## 2025-04-15 VITALS
OXYGEN SATURATION: 98 % | SYSTOLIC BLOOD PRESSURE: 94 MMHG | DIASTOLIC BLOOD PRESSURE: 61 MMHG | HEART RATE: 92 BPM | BODY MASS INDEX: 44.25 KG/M2 | WEIGHT: 257.8 LBS | RESPIRATION RATE: 20 BRPM

## 2025-04-15 VITALS
DIASTOLIC BLOOD PRESSURE: 70 MMHG | SYSTOLIC BLOOD PRESSURE: 100 MMHG | RESPIRATION RATE: 16 BRPM | OXYGEN SATURATION: 98 % | TEMPERATURE: 97.6 F | HEART RATE: 85 BPM

## 2025-04-15 DIAGNOSIS — E66.813 CLASS 3 SEVERE OBESITY DUE TO EXCESS CALORIES WITH SERIOUS COMORBIDITY AND BODY MASS INDEX (BMI) OF 50.0 TO 59.9 IN ADULT: ICD-10-CM

## 2025-04-15 DIAGNOSIS — I50.812 CHRONIC RIGHT-SIDED HEART FAILURE (H): ICD-10-CM

## 2025-04-15 DIAGNOSIS — I27.20 PULMONARY HYPERTENSION (H): ICD-10-CM

## 2025-04-15 DIAGNOSIS — B02.9 SHINGLES: ICD-10-CM

## 2025-04-15 DIAGNOSIS — E66.2 OBESITY HYPOVENTILATION SYNDROME (H): ICD-10-CM

## 2025-04-15 DIAGNOSIS — G47.33 OSA ON CPAP: ICD-10-CM

## 2025-04-15 DIAGNOSIS — I50.32 CHRONIC HEART FAILURE WITH PRESERVED EJECTION FRACTION (H): Primary | ICD-10-CM

## 2025-04-15 PROCEDURE — 99213 OFFICE O/P EST LOW 20 MIN: CPT | Performed by: PHYSICIAN ASSISTANT

## 2025-04-15 PROCEDURE — G0463 HOSPITAL OUTPT CLINIC VISIT: HCPCS | Performed by: PHYSICIAN ASSISTANT

## 2025-04-15 RX ORDER — VALACYCLOVIR HYDROCHLORIDE 1 G/1
1000 TABLET, FILM COATED ORAL 3 TIMES DAILY
Qty: 21 TABLET | Refills: 0 | Status: SHIPPED | OUTPATIENT
Start: 2025-04-15 | End: 2025-04-22

## 2025-04-15 ASSESSMENT — ACTIVITIES OF DAILY LIVING (ADL): ADLS_ACUITY_SCORE: 58

## 2025-04-15 ASSESSMENT — COLUMBIA-SUICIDE SEVERITY RATING SCALE - C-SSRS
6. HAVE YOU EVER DONE ANYTHING, STARTED TO DO ANYTHING, OR PREPARED TO DO ANYTHING TO END YOUR LIFE?: NO
2. HAVE YOU ACTUALLY HAD ANY THOUGHTS OF KILLING YOURSELF IN THE PAST MONTH?: NO
1. IN THE PAST MONTH, HAVE YOU WISHED YOU WERE DEAD OR WISHED YOU COULD GO TO SLEEP AND NOT WAKE UP?: NO

## 2025-04-15 ASSESSMENT — ENCOUNTER SYMPTOMS: CONSTITUTIONAL NEGATIVE: 1

## 2025-04-15 NOTE — LETTER
4/15/2025    Mikala Luna MD  [address]  [facility] [address]    RE: Bess Enamorado       Dear Colleague,     I had the pleasure of seeing Bess Enamorado in the [facility].              ~Cardiology Clinic Visit~    Primary Cardiologist: Dr. Diaz  Reason for visit: Hospital follow up  CORE return    History of Present Illness    Bess Enamorado is a very pleasant 49 year old female with a past medical history notable for hypertension, history of pulmonary embolism, severe pulmonary hypertension, NARCISA on CPAP, morbid obesity with BMI around 56, chronic HFpEF with primarily right-sided heart failure and RV dysfunction, multiple sclerosis, lymphedema, and binge eating disorder.     Over the years, patient has had progressive RV dilation and dysfunction on her echocardiograms. Her most recent echocardiogram in July of this year showed severe pulmonary hypertension with RVSP 82 mm hg +RA, and IVC diameter >2.1 cm collapsing <50% with sniff suggesting a high RA pressure estimated at 15 mmHg or greater. Her LV function is hyperdynamic.  She has mild mitral stenosis.    I saw her in January 2025, and at that time she appeared volume up with increased weight trend.  Bumex dosing was increased from 4 mg daily to 6 mg daily.  In the interim, she was admitted at [facility] on 2/11/2025 following a fall.  Cardiology was consulted due to worsening shortness of breath and volume overload and as she was found to have acute  on chronic HFpEF/right-sided heart failure exacerbation.  276# at discharge.    She struggles to check weight consistently, has dietary indiscretion.  On 3/3 she reported a weight of 256#, then 265# the following week and 269# on 3/17/25 at clinic.  She is on supplemental O2 at 3L most of the day, CPAP at night.  She reported worsening LE edema with this acute weight gain > gets around with wheelchair, has had weeping edema to her left leg (has wound care services).  With  wrapping her legs, she has noticed more abdominal bloating.  Bumex augmented further (up to 8 mg daily).  Discussed infusion clinic as next step if no improvement.    On 3/22/25, she was admitted back to [facility] with cystitis with severe sepsis, plus CHF exacerbation with leg edema.  Admitting weight 282# (?).  US left leg + for DVT, Eliquis started.  Bess was discharged to home at a weight around 262#.  She went back to ER 4/2/25 for fever, LLE swelling and pain (Hx lymphedema with increased redness and warmth of the extermity).  Dx with cellulitis.  Tx with antibiotics and discharged 4/7.    Renal function and electrolytes from 4/7/25 are stable.    Interval 04/15/25    From a cardiac standpoint, things are stable today.  Weight is controlled.  She noticed a new left flank rash today, this is associated with dull left sided back pain.  The rash is unilateral, erythematous appearing lesions in clusters with closed blisters.  She also has a left abdominal fold that is starting to open.  She treated it with fungal powder, appears to be moisture related skin breakdown.  __________________________________________________________________         Assessment and Impression:     Severe right-sided heart failure with acute on chronic HFpEF  Severe pulmonary hypertension, oxygen dependent on 3 L/min at baseline  Chronic lymphedema  NARCISA on CPAP  Multiple sclerosis  Morbid obesity  History of pulmonary embolism  Skin breakdown  Unilateral left flank clustered rash         Recommendations and Plan:     Recommend urgent care evaluation for rash.  Consider shingles related rash?  Keep cardiac plan the same at this time as she is doing well.  ANNAMARIE CORE visit in 2-months.  __________________________________________________________________    Thank you for the opportunity to participate in this pleasant patient's care.    We would be happy to see this patient sooner for any concerns in the meantime.    JULIETA Anguiano, CNP    Nurse Practitioner  [facility]    Today's clinic visit entailed:  The following tests were independently interpreted by me as noted in my documentation: labs, hospital notes  Prescription drug management  The level of medical decision making during this visit was of moderate complexity.  Review of the result(s) of each unique test - cardiac testing, cardiac imaging, labs  Care everywhere reviewed for additional records to facilitate comprehensive patient care.  Recent hospitalization records and notes reviewed to facilitate comprehensive care coordination.    Orders this Visit:  Orders Placed This Encounter   Procedures     Follow-Up with Cardiology- ANNAMARIE     No orders of the defined types were placed in this encounter.    There are no discontinued medications.  Encounter Diagnoses   Name Primary?     Chronic heart failure with preserved ejection fraction (H) Yes     Obesity hypoventilation syndrome (H)      Pulmonary hypertension (H)      Chronic right-sided heart failure (H)      NARCISA on CPAP      Class 3 severe obesity due to excess calories with serious comorbidity and body mass index (BMI) of 50.0 to 59.9 in adult (H)      CURRENT MEDICATIONS:  Current Outpatient Medications   Medication Sig Dispense Refill     acetaminophen (TYLENOL) 650 MG CR tablet Take 1,300 mg by mouth every 8 hours as needed for mild pain or fever.       apixaban ANTICOAGULANT (ELIQUIS) 5 MG tablet Take 1 tablet (5 mg) by mouth 2 times daily. 90 tablet 3     aspirin 81 MG EC tablet Take 81 mg by mouth every morning.       bumetanide (BUMEX) 2 MG tablet Take 6 mg by mouth every morning.       Emollient (GOLD BOND MEDICATED BODY EX) Externally apply 1 Application topically 2 times daily as needed       empagliflozin (JARDIANCE) 10 MG TABS tablet Take 10 mg by mouth every morning.       escitalopram (LEXAPRO) 10 MG tablet Take 10 mg by mouth every morning.       lactobacillus rhamnosus, GG, (CULTURELL) capsule Take 1  capsule by mouth 2 times daily for 14 days.       miconazole (MICATIN) 2 % external powder Apply topically 2 times daily as needed for itching or other.       potassium chloride sheila ER (KLOR-CON M10) 10 MEQ CR tablet Take 30 mEq by mouth every morning.       sennosides (SENOKOT) 8.6 MG tablet Take 1 tablet by mouth 2 times daily as needed for constipation. 20 tablet 0     simvastatin (ZOCOR) 10 MG tablet Take 1 tablet (10 mg) by mouth at bedtime. 90 tablet 3     spironolactone (ALDACTONE) 25 MG tablet Take 12.5 mg by mouth every morning.       ALLERGIES   No Known Allergies  PAST MEDICAL, SURGICAL, FAMILY, SOCIAL HISTORY:  History was reviewed and updated as needed, see medical record.    Review of Systems:  A 10-point Review Of Systems is otherwise normal except for that which is noted in the HPI and interval summary.    Physical Exam:    Vitals: BP 94/61 (BP Location: Right arm, Patient Position: Sitting, Cuff Size: Adult Large)   Pulse 92   Resp 20   Wt 116.9 kg (257 lb 12.8 oz)   LMP 04/08/2025 (Within Days)   SpO2 98%   BMI 44.25 kg/m    Constitutional: Appears stated age, well nourished, NAD.  Respiratory: Non-labored. Lungs clear  Cardiovascular: RRR, normal S1 and S2.  Chronic lymphedema.  GI: Soft, non-distended, non-tender.  Skin: Warm and dry.   Musculoskeletal/Extremities: Symmetrical movement.  Neurologic: No gross focal deficits. Alert, awake.  Psychiatric: Affect appropriate. Mentation normal.    Recent Lab Results:  LIPID RESULTS:  Lab Results   Component Value Date    CHOL 81 02/12/2025    CHOL 162 02/11/2016    HDL 11 (L) 02/12/2025    HDL 64 02/11/2016    LDL 43 02/12/2025    LDL 75 02/11/2016    TRIG 134 02/12/2025    TRIG 113 02/11/2016    CHOLHDLRATIO 4.0 11/07/2011     LIVER ENZYME RESULTS:  Lab Results   Component Value Date    AST 20 04/02/2025    AST 18 03/07/2021    ALT 11 04/02/2025    ALT 17 03/07/2021     CBC RESULTS:  Lab Results   Component Value Date    WBC 10.4 04/03/2025     WBC 7.3 2021    RBC 4.65 2025    RBC 4.91 2021    HGB 13.4 2025    HGB 12.5 2021    HCT 42.0 2025    HCT 42.8 2021    MCV 90 2025    MCV 87 2021    MCH 28.8 2025    MCH 25.5 (L) 2021    MCHC 31.9 2025    MCHC 29.2 (L) 2021    RDW 17.5 (H) 2025    RDW 15.6 (H) 2021     2025     2021     BMP RESULTS:  Lab Results   Component Value Date     2025     2021    POTASSIUM 4.1 2025    POTASSIUM 3.6 2022    POTASSIUM 4.3 2021    CHLORIDE 104 2025    CHLORIDE 98 2022    CHLORIDE 105 2021    CO2 29 2025    CO2 25 2022    CO2 30 2021    ANIONGAP 6 (L) 2025    ANIONGAP 11 2022    ANIONGAP 4 2021    GLC 91 2025    GLC 81 2025    GLC 94 2022    GLC 99 2021    BUN 15.7 2025    BUN 22 2022    BUN 12 2021    CR 0.86 2025    CR 0.62 2021    GFRESTIMATED 82 2025    GFRESTIMATED >90 2021    GFRESTBLACK >90 2021    EVITA 9.5 2025    EVITA 9.4 2021      A1C RESULTS:  Lab Results   Component Value Date    A1C 5.6 2025    A1C 5.7 (H) 2020     INR RESULTS:  Lab Results   Component Value Date    INR 1.30 (H) 2025    INR 1.28 (H) 2025    INR 0.96 2013       Recent Results (from the past 4320 hours)   Echocardiogram Complete    Narrative    [id number]  [id number]  [id number]  [id number]^ESTELLA^[place] [address]     Name: CITLALY MICHEL  MRN: [id number]  : 1975  Study Date: 2025 10:25 AM  Age: 49 yrs  Gender: Female  Patient Location: [facility]  Reason For Study: CHF  Ordering Physician: NINA SORIA  Performed By: REBECCA     BSA: 2.4 m2  Height: 64 in  Weight: 330 lb  HR: 98  BP: 111/66  mmHg  ______________________________________________________________________________  Procedure  Echocardiogram with two-dimensional, color and spectral Doppler. Definity (NDC  #[id number]) given intravenously.  ______________________________________________________________________________  Interpretation Summary     1. Technically difficult study.  2. The left ventricle is normal in size.Image quality does not provide for  detailed assessment of LV systolic function, but is felt to be normal with a  visually estimated ejection fraction of roughly 65%.  3. There is abnormal septal motion c/w right ventricular overload.  4. Probable mild-moderate tricuspid insufficiency (the color Doppler tricuspid  insufficiency jet is poorly visualized).  5. There is severe right ventricular enlargement with severely reduced right  ventricular systolic performance.  6. The left atrium appears enlarged though difficult to quantify due to  inadequate visualization. There is severe right atrial enlargement.  7. Right ventricular systolic pressure relative to right atrial pressure is  moderate-severely increased. The pulmonary artery pressure is estimated to be  70-75 mmHg plus right atrial pressure. In addition, the IVC appears dilated  with decreased phasic variation in caval diameter consistent with elevated  right atrial pressure.     When compared to the prior real-time echocardiogram dated 19 July 2024, the  pulmonary artery pressure estimate measures slightly less on the current  study. The findings are otherwise felt to be fairly similar on both  examinations (although the right ventricular size was previously described as  only mildly enlarged, it is this reader's impression that right ventricular  size was severely increased on the prior study with concomitant severe  depression and right ventricular systolic performance).  ______________________________________________________________________________  Left ventricle:  The  left ventricle is normal in size.Image quality does not provide for  detailed assessment of LV systolic function, but is felt to be normal with a  visually estimated ejection fraction of [id number]%. There is abnormal septal  motion c/w right ventricular overload. Left ventricular wall thickness is  normal.     Assessment of LV Diastolic Function: The cumulative findings are indeterminate  in the evaluation of diastolic function.     Right ventricle:  There is severe right ventricular enlargement with severely reduced right  ventricular systolic performance.     Left atrium:  The left atrium appears enlarged though difficult to quantify due to  inadequate visualization.     Right atrium:  There is severe right atrial enlargement.     IVC:  The IVC appears dilated with decreased phasic variation in caval diameter  consistent with elevated right atrial pressure.     Aortic valve:  The aortic valve is not well visualized, but suspected to be comprised of  three cusps. No significant aortic stenosis or aortic insufficiency is  detected on this study.     Mitral valve:  There is moderate mitral annular calcification. No mitral insufficiency of  significance is appreciated.     Tricuspid valve:  The tricuspid valve is not well-visualized. Probable mild-moderate tricuspid  insufficiency (the color Doppler tricuspid insufficiency jet is poorly  visualized).     Pulmonic valve:  The pulmonic valve is not well-visualized.     Thoracic aorta:  There is borderline enlargement of the proximal ascending aorta.     Pericardium:  There is no significant pericardial effusion.  ______________________________________________________________________________  ______________________________________________________________________________  MMode/2D Measurements & Calculations  Ao root diam: 3.4 cm  asc Aorta Diam: 3.8 cm  LVOT diam: 2.3 cm  LVOT area: 4.1 cm2  Ao root diam index Ht(cm/m): 2.1  Ao root diam index BSA (cm/m2): 1.4  Asc Ao  diam index BSA (cm/m2): 1.6  Asc Ao diam index Ht(cm/m): 2.3     Time Measurements  MM HR: 98.0 BPM     Doppler Measurements & Calculations  MV E max jim: 65.6 cm/sec  MV A max jim: 131.9 cm/sec  MV E/A: 0.50  MV max P.6 mmHg  MV mean PG: 3.7 mmHg  MV V2 VTI: 20.3 cm  MVA(VTI): 3.7 cm2     MV dec slope: 1165 cm/sec2  MV dec time: 0.06 sec  Ao V2 max: 137.7 cm/sec  Ao max P.0 mmHg  Ao V2 mean: 90.7 cm/sec  Ao mean PG: 3.8 mmHg  Ao V2 VTI: 19.4 cm  SHANELL(I,D): 3.8 cm2  SHANELL(V,D): 2.5 cm2  LV V1 max P.7 mmHg  LV V1 max: 82.4 cm/sec  LV V1 VTI: 18.0 cm  SV(LVOT): 74.5 ml  SI(LVOT): 30.8 ml/m2  TR max jim: 429.4 cm/sec  TR max P.7 mmHg  AV Jim Ratio (DI): 0.60  SHANELL Index (cm2/m2): 1.6     ______________________________________________________________________________  Report approved by: Otoniel Nath MD on 2025 01:41 PM                              Thank you for allowing me to participate in the care of your patient.      Sincerely,     JULIETA Anguiano CNP     [facility]  cc:   JULIETA Anguiano CNP  [address]  [place] [address]

## 2025-04-15 NOTE — PATIENT INSTRUCTIONS
Thank you for your visit with the [facility].    Today's Summary:    No changes for today.  Urgent care to have rash evaluated.    Follow-up:  Cardiology follow up at [facility]: 2-months.     Cardiology Scheduling~[phone]  Cardiology Clinic RN~[phone] (Tomasa RN, Heather RN; Lauren RN)    It was a pleasure seeing you today.     Christin Holt, APRN, CNP  Certified Nurse Practitioner  [facility]  April 15, 2025  ________________________________________________________

## 2025-04-16 ENCOUNTER — TELEPHONE (OUTPATIENT)
Dept: FAMILY MEDICINE | Facility: CLINIC | Age: 50
End: 2025-04-16
Payer: COMMERCIAL

## 2025-04-16 DIAGNOSIS — R30.0 DYSURIA: Primary | ICD-10-CM

## 2025-04-16 NOTE — TELEPHONE ENCOUNTER
"Barbara from [facility] called asking for orders for urine specimen?  Home care could collect it.  Pt complains that she has \"slightly increased vaginal symptoms\" and would like to be evaluated for UTI.    Informed Barbara that I will call and triage pt further.  Given current message, cannot proceed with this request.    Belen Martinez RN     "

## 2025-04-16 NOTE — TELEPHONE ENCOUNTER
Order faxed, home care notified, patient notified of E-visit/virtual option.     Pedro Rodriguez [facility]

## 2025-04-16 NOTE — TELEPHONE ENCOUNTER
UA ordered.     Notify patient that the better way to do this would be a virtual/e-visit.     Please fax the order to homecare for the UA/.    Mikala Luna M.D.

## 2025-04-16 NOTE — TELEPHONE ENCOUNTER
Routed to provider for review and recommendation.    I spoke with pt to clarify her request.    Pt explains that she often does not get symptoms of UTI until she gets a fever and by then the infection is more advanced.    Pt says that she attended cardiology appt yesterday and wore a diaper for 4 hours.  Afterward, she noticed vaginal irritation.  She is unsure if she is irritated from the diaper or if it provoked start of infection.      Pt denies any discharge.  Denies dysuria.  She is trying to observe her urine for sediment but has not collected enough urine to view if there is sediment.    If provider orders a urine specimen collection, home care is asking that the order be faxed to [phone] and home care could collect the urine.  Will need to call Barbara [facility] and let know the urine was ordered.  Home care would collect the specimen tomorrow if ordered.    Belen Martinez RN

## 2025-04-18 ENCOUNTER — LAB REQUISITION (OUTPATIENT)
Dept: LAB | Facility: CLINIC | Age: 50
End: 2025-04-18
Payer: COMMERCIAL

## 2025-04-18 DIAGNOSIS — R30.0 DYSURIA: ICD-10-CM

## 2025-04-18 LAB
ALBUMIN UR-MCNC: 20 MG/DL
APPEARANCE UR: ABNORMAL
BACTERIA #/AREA URNS HPF: ABNORMAL /HPF
BILIRUB UR QL STRIP: NEGATIVE
CAOX CRY #/AREA URNS HPF: ABNORMAL /HPF
COLOR UR AUTO: YELLOW
GLUCOSE UR STRIP-MCNC: 1000 MG/DL
HGB UR QL STRIP: ABNORMAL
HYALINE CASTS: 2 /LPF
KETONES UR STRIP-MCNC: NEGATIVE MG/DL
LEUKOCYTE ESTERASE UR QL STRIP: ABNORMAL
MUCOUS THREADS #/AREA URNS LPF: PRESENT /LPF
NITRATE UR QL: NEGATIVE
PH UR STRIP: 5.5 [PH] (ref 5–7)
RBC URINE: 24 /HPF
SP GR UR STRIP: 1.01 (ref 1–1.03)
SQUAMOUS EPITHELIAL: 6 /HPF
TRANSITIONAL EPI: 2 /HPF
UROBILINOGEN UR STRIP-MCNC: NORMAL MG/DL
WBC CLUMPS #/AREA URNS HPF: PRESENT /HPF
WBC URINE: >182 /HPF

## 2025-04-18 PROCEDURE — 81001 URINALYSIS AUTO W/SCOPE: CPT | Mod: ORL | Performed by: FAMILY MEDICINE

## 2025-04-18 PROCEDURE — 87086 URINE CULTURE/COLONY COUNT: CPT | Mod: ORL | Performed by: FAMILY MEDICINE

## 2025-04-19 ENCOUNTER — HEALTH MAINTENANCE LETTER (OUTPATIENT)
Age: 50
End: 2025-04-19

## 2025-04-19 LAB — BACTERIA UR CULT: NORMAL

## 2025-04-28 ENCOUNTER — TELEPHONE (OUTPATIENT)
Dept: FAMILY MEDICINE | Facility: CLINIC | Age: 50
End: 2025-04-28
Payer: COMMERCIAL

## 2025-04-28 NOTE — TELEPHONE ENCOUNTER
Home Care is calling regarding an established patient with [facility].  Requesting orders from: Mikala Luna  RN APPROVED: RN able to provide verbal orders.  Home Care will send orders for signature.  RN will close encounter.  Is this a request for a temporary pause in the home care episode?  No    Orders Requested    Skilled Nursing  Request for continuation of care with decrease in frequency   Goals have been met/progressing.  Frequency: Decreasing visits from 3x/week to 2x/week for 4 weeks, then 1x/week for 4 weeks since wounds are healing and patient is progressing well.   RN gave verbal order: Yes      Contacts       Contact Date/Time Type Contact Phone/Fax    04/28/2025 02:28 PM CDT Phone (Incoming) KINDRA Mackey (Home Care) [phone]     [facility]          JAMAL MATAMOROS, KINDRA

## 2025-04-29 ENCOUNTER — TELEPHONE (OUTPATIENT)
Dept: FAMILY MEDICINE | Facility: CLINIC | Age: 50
End: 2025-04-29
Payer: COMMERCIAL

## 2025-04-29 NOTE — TELEPHONE ENCOUNTER
Home Care is calling regarding an established patient with [facility].  Requesting orders from: Mikala Luna RN APPROVED: RN able to provide verbal orders.  Home Care will send orders for signature.  RN will close encounter.  Is this a request for a temporary pause in the home care episode?  No    Orders Requested    Physical Therapy  One visit in 2 weeks.      Contacts       Contact Date/Time Type Contact Phone/Fax    04/29/2025 01:13 PM CDT Phone (Incoming) [facility] [phone]          Mary Gramajo RN

## 2025-05-06 ENCOUNTER — MEDICAL CORRESPONDENCE (OUTPATIENT)
Dept: HEALTH INFORMATION MANAGEMENT | Facility: CLINIC | Age: 50
End: 2025-05-06
Payer: COMMERCIAL

## 2025-05-08 ENCOUNTER — APPOINTMENT (OUTPATIENT)
Dept: ULTRASOUND IMAGING | Facility: HOSPITAL | Age: 50
End: 2025-05-08
Attending: EMERGENCY MEDICINE
Payer: COMMERCIAL

## 2025-05-08 ENCOUNTER — HOSPITAL ENCOUNTER (INPATIENT)
Facility: HOSPITAL | Age: 50
End: 2025-05-08
Attending: EMERGENCY MEDICINE
Payer: COMMERCIAL

## 2025-05-08 ENCOUNTER — APPOINTMENT (OUTPATIENT)
Dept: RADIOLOGY | Facility: HOSPITAL | Age: 50
End: 2025-05-08
Attending: EMERGENCY MEDICINE
Payer: COMMERCIAL

## 2025-05-08 ENCOUNTER — TELEPHONE (OUTPATIENT)
Dept: FAMILY MEDICINE | Facility: CLINIC | Age: 50
End: 2025-05-08
Payer: COMMERCIAL

## 2025-05-08 DIAGNOSIS — L03.311 CELLULITIS OF ABDOMINAL WALL: ICD-10-CM

## 2025-05-08 DIAGNOSIS — A41.9 SEVERE SEPSIS (H): ICD-10-CM

## 2025-05-08 DIAGNOSIS — R65.20 SEVERE SEPSIS (H): ICD-10-CM

## 2025-05-08 DIAGNOSIS — I50.32 CHRONIC HEART FAILURE WITH PRESERVED EJECTION FRACTION (H): ICD-10-CM

## 2025-05-08 DIAGNOSIS — L30.4 INTERTRIGO: Primary | ICD-10-CM

## 2025-05-08 DIAGNOSIS — A04.72 C. DIFFICILE COLITIS: ICD-10-CM

## 2025-05-08 LAB
ALBUMIN SERPL BCG-MCNC: 4 G/DL (ref 3.5–5.2)
ALBUMIN UR-MCNC: 20 MG/DL
ALP SERPL-CCNC: 78 U/L (ref 40–150)
ALT SERPL W P-5'-P-CCNC: 8 U/L (ref 0–50)
ANION GAP SERPL CALCULATED.3IONS-SCNC: 12 MMOL/L (ref 7–15)
APPEARANCE UR: CLEAR
AST SERPL W P-5'-P-CCNC: 15 U/L (ref 0–45)
BASE EXCESS BLDV CALC-SCNC: 0.8 MMOL/L (ref -3–3)
BASOPHILS # BLD AUTO: 0 10E3/UL (ref 0–0.2)
BASOPHILS NFR BLD AUTO: 1 %
BILIRUB SERPL-MCNC: 2.3 MG/DL
BILIRUB UR QL STRIP: NEGATIVE
BUN SERPL-MCNC: 22.8 MG/DL (ref 6–20)
CALCIUM SERPL-MCNC: 10 MG/DL (ref 8.8–10.4)
CHLORIDE SERPL-SCNC: 98 MMOL/L (ref 98–107)
COLOR UR AUTO: ABNORMAL
CREAT SERPL-MCNC: 0.83 MG/DL (ref 0.51–0.95)
EGFRCR SERPLBLD CKD-EPI 2021: 85 ML/MIN/1.73M2
EOSINOPHIL # BLD AUTO: 0.1 10E3/UL (ref 0–0.7)
EOSINOPHIL NFR BLD AUTO: 1 %
ERYTHROCYTE [DISTWIDTH] IN BLOOD BY AUTOMATED COUNT: 16.8 % (ref 10–15)
FLUAV RNA SPEC QL NAA+PROBE: NEGATIVE
FLUBV RNA RESP QL NAA+PROBE: NEGATIVE
GLUCOSE BLDC GLUCOMTR-MCNC: 101 MG/DL (ref 70–99)
GLUCOSE SERPL-MCNC: 98 MG/DL (ref 70–99)
GLUCOSE UR STRIP-MCNC: >1000 MG/DL
HCO3 BLDV-SCNC: 29 MMOL/L (ref 21–28)
HCO3 SERPL-SCNC: 29 MMOL/L (ref 22–29)
HCT VFR BLD AUTO: 45.6 % (ref 35–47)
HGB BLD-MCNC: 14.4 G/DL (ref 11.7–15.7)
HGB UR QL STRIP: NEGATIVE
HOLD SPECIMEN: NORMAL
HYALINE CASTS: 1 /LPF
IMM GRANULOCYTES # BLD: 0 10E3/UL
IMM GRANULOCYTES NFR BLD: 0 %
KETONES UR STRIP-MCNC: NEGATIVE MG/DL
L PNEUMO1 AG UR QL IA: NEGATIVE
LACTATE SERPL-SCNC: 1.6 MMOL/L (ref 0.7–2)
LACTATE SERPL-SCNC: 2.3 MMOL/L (ref 0.7–2)
LACTATE SERPL-SCNC: 5 MMOL/L (ref 0.7–2)
LACTATE SERPL-SCNC: 5.1 MMOL/L (ref 0.7–2)
LEUKOCYTE ESTERASE UR QL STRIP: ABNORMAL
LYMPHOCYTES # BLD AUTO: 0.7 10E3/UL (ref 0.8–5.3)
LYMPHOCYTES NFR BLD AUTO: 12 %
MAGNESIUM SERPL-MCNC: 2.1 MG/DL (ref 1.7–2.3)
MCH RBC QN AUTO: 29.1 PG (ref 26.5–33)
MCHC RBC AUTO-ENTMCNC: 31.6 G/DL (ref 31.5–36.5)
MCV RBC AUTO: 92 FL (ref 78–100)
MONOCYTES # BLD AUTO: 0.3 10E3/UL (ref 0–1.3)
MONOCYTES NFR BLD AUTO: 4 %
MRSA DNA SPEC QL NAA+PROBE: NEGATIVE
NEUTROPHILS # BLD AUTO: 5.1 10E3/UL (ref 1.6–8.3)
NEUTROPHILS NFR BLD AUTO: 82 %
NITRATE UR QL: NEGATIVE
NRBC # BLD AUTO: 0 10E3/UL
NRBC BLD AUTO-RTO: 0 /100
NT-PROBNP SERPL-MCNC: 2556 PG/ML
O2/TOTAL GAS SETTING VFR VENT: 60 %
OXYHGB MFR BLDV: 30 % (ref 70–75)
PCO2 BLDV: 58 MM HG (ref 40–50)
PH BLDV: 7.31 [PH] (ref 7.32–7.43)
PH UR STRIP: 7 [PH] (ref 5–7)
PHOSPHATE SERPL-MCNC: 3.1 MG/DL (ref 2.5–4.5)
PLATELET # BLD AUTO: 205 10E3/UL (ref 150–450)
PO2 BLDV: 23 MM HG (ref 25–47)
POTASSIUM SERPL-SCNC: 3.4 MMOL/L (ref 3.4–5.3)
PROT SERPL-MCNC: 8.3 G/DL (ref 6.4–8.3)
RBC # BLD AUTO: 4.94 10E6/UL (ref 3.8–5.2)
RBC URINE: 1 /HPF
RSV RNA SPEC NAA+PROBE: NEGATIVE
S PNEUM AG SPEC QL: NEGATIVE
SA TARGET DNA: NEGATIVE
SAO2 % BLDV: 30.4 % (ref 70–75)
SARS-COV-2 RNA RESP QL NAA+PROBE: NEGATIVE
SODIUM SERPL-SCNC: 139 MMOL/L (ref 135–145)
SP GR UR STRIP: 1.02 (ref 1–1.03)
SPECIMEN TYPE: NORMAL
SQUAMOUS EPITHELIAL: 3 /HPF
TROPONIN T SERPL HS-MCNC: 14 NG/L
UROBILINOGEN UR STRIP-MCNC: NORMAL MG/DL
WBC # BLD AUTO: 6.2 10E3/UL (ref 4–11)
WBC CLUMPS #/AREA URNS HPF: PRESENT /HPF
WBC URINE: 10 /HPF

## 2025-05-08 PROCEDURE — 258N000003 HC RX IP 258 OP 636: Performed by: STUDENT IN AN ORGANIZED HEALTH CARE EDUCATION/TRAINING PROGRAM

## 2025-05-08 PROCEDURE — 94660 CPAP INITIATION&MGMT: CPT

## 2025-05-08 PROCEDURE — 87640 STAPH A DNA AMP PROBE: CPT | Performed by: STUDENT IN AN ORGANIZED HEALTH CARE EDUCATION/TRAINING PROGRAM

## 2025-05-08 PROCEDURE — 82805 BLOOD GASES W/O2 SATURATION: CPT | Performed by: EMERGENCY MEDICINE

## 2025-05-08 PROCEDURE — 99291 CRITICAL CARE FIRST HOUR: CPT | Mod: 25

## 2025-05-08 PROCEDURE — 83605 ASSAY OF LACTIC ACID: CPT | Performed by: STUDENT IN AN ORGANIZED HEALTH CARE EDUCATION/TRAINING PROGRAM

## 2025-05-08 PROCEDURE — 5A09357 ASSISTANCE WITH RESPIRATORY VENTILATION, LESS THAN 24 CONSECUTIVE HOURS, CONTINUOUS POSITIVE AIRWAY PRESSURE: ICD-10-PCS | Performed by: EMERGENCY MEDICINE

## 2025-05-08 PROCEDURE — 85018 HEMOGLOBIN: CPT | Performed by: EMERGENCY MEDICINE

## 2025-05-08 PROCEDURE — 83735 ASSAY OF MAGNESIUM: CPT | Performed by: STUDENT IN AN ORGANIZED HEALTH CARE EDUCATION/TRAINING PROGRAM

## 2025-05-08 PROCEDURE — 83605 ASSAY OF LACTIC ACID: CPT | Performed by: EMERGENCY MEDICINE

## 2025-05-08 PROCEDURE — 999N000254 HC STATISTIC VENTILATOR TRANSFER

## 2025-05-08 PROCEDURE — 71046 X-RAY EXAM CHEST 2 VIEWS: CPT

## 2025-05-08 PROCEDURE — 36415 COLL VENOUS BLD VENIPUNCTURE: CPT | Performed by: STUDENT IN AN ORGANIZED HEALTH CARE EDUCATION/TRAINING PROGRAM

## 2025-05-08 PROCEDURE — 250N000011 HC RX IP 250 OP 636: Performed by: EMERGENCY MEDICINE

## 2025-05-08 PROCEDURE — 250N000013 HC RX MED GY IP 250 OP 250 PS 637: Performed by: EMERGENCY MEDICINE

## 2025-05-08 PROCEDURE — 999N000157 HC STATISTIC RCP TIME EA 10 MIN

## 2025-05-08 PROCEDURE — 96368 THER/DIAG CONCURRENT INF: CPT

## 2025-05-08 PROCEDURE — 87086 URINE CULTURE/COLONY COUNT: CPT | Performed by: EMERGENCY MEDICINE

## 2025-05-08 PROCEDURE — 96365 THER/PROPH/DIAG IV INF INIT: CPT

## 2025-05-08 PROCEDURE — 82247 BILIRUBIN TOTAL: CPT | Performed by: EMERGENCY MEDICINE

## 2025-05-08 PROCEDURE — 120N000001 HC R&B MED SURG/OB

## 2025-05-08 PROCEDURE — 87899 AGENT NOS ASSAY W/OPTIC: CPT | Performed by: STUDENT IN AN ORGANIZED HEALTH CARE EDUCATION/TRAINING PROGRAM

## 2025-05-08 PROCEDURE — 99223 1ST HOSP IP/OBS HIGH 75: CPT | Performed by: STUDENT IN AN ORGANIZED HEALTH CARE EDUCATION/TRAINING PROGRAM

## 2025-05-08 PROCEDURE — 258N000003 HC RX IP 258 OP 636: Performed by: EMERGENCY MEDICINE

## 2025-05-08 PROCEDURE — 84484 ASSAY OF TROPONIN QUANT: CPT | Performed by: STUDENT IN AN ORGANIZED HEALTH CARE EDUCATION/TRAINING PROGRAM

## 2025-05-08 PROCEDURE — 83880 ASSAY OF NATRIURETIC PEPTIDE: CPT | Performed by: STUDENT IN AN ORGANIZED HEALTH CARE EDUCATION/TRAINING PROGRAM

## 2025-05-08 PROCEDURE — 36415 COLL VENOUS BLD VENIPUNCTURE: CPT | Performed by: EMERGENCY MEDICINE

## 2025-05-08 PROCEDURE — 87077 CULTURE AEROBIC IDENTIFY: CPT | Performed by: STUDENT IN AN ORGANIZED HEALTH CARE EDUCATION/TRAINING PROGRAM

## 2025-05-08 PROCEDURE — 84100 ASSAY OF PHOSPHORUS: CPT | Performed by: STUDENT IN AN ORGANIZED HEALTH CARE EDUCATION/TRAINING PROGRAM

## 2025-05-08 PROCEDURE — 87154 CUL TYP ID BLD PTHGN 6+ TRGT: CPT | Performed by: EMERGENCY MEDICINE

## 2025-05-08 PROCEDURE — 93971 EXTREMITY STUDY: CPT | Mod: LT

## 2025-05-08 PROCEDURE — 87637 SARSCOV2&INF A&B&RSV AMP PRB: CPT | Performed by: EMERGENCY MEDICINE

## 2025-05-08 PROCEDURE — 250N000013 HC RX MED GY IP 250 OP 250 PS 637: Performed by: STUDENT IN AN ORGANIZED HEALTH CARE EDUCATION/TRAINING PROGRAM

## 2025-05-08 PROCEDURE — 250N000011 HC RX IP 250 OP 636: Performed by: STUDENT IN AN ORGANIZED HEALTH CARE EDUCATION/TRAINING PROGRAM

## 2025-05-08 PROCEDURE — 87040 BLOOD CULTURE FOR BACTERIA: CPT | Performed by: EMERGENCY MEDICINE

## 2025-05-08 PROCEDURE — 81001 URINALYSIS AUTO W/SCOPE: CPT | Performed by: EMERGENCY MEDICINE

## 2025-05-08 RX ORDER — PIPERACILLIN SODIUM, TAZOBACTAM SODIUM 4; .5 G/20ML; G/20ML
4.5 INJECTION, POWDER, LYOPHILIZED, FOR SOLUTION INTRAVENOUS EVERY 6 HOURS
Status: DISPENSED | OUTPATIENT
Start: 2025-05-08

## 2025-05-08 RX ORDER — POLYETHYLENE GLYCOL 3350 17 G/17G
17 POWDER, FOR SOLUTION ORAL 2 TIMES DAILY PRN
Status: ACTIVE | OUTPATIENT
Start: 2025-05-08

## 2025-05-08 RX ORDER — POTASSIUM CHLORIDE 1500 MG/1
40 TABLET, EXTENDED RELEASE ORAL ONCE
Status: COMPLETED | OUTPATIENT
Start: 2025-05-08 | End: 2025-05-08

## 2025-05-08 RX ORDER — PIPERACILLIN SODIUM, TAZOBACTAM SODIUM 4; .5 G/20ML; G/20ML
4.5 INJECTION, POWDER, LYOPHILIZED, FOR SOLUTION INTRAVENOUS ONCE
Status: COMPLETED | OUTPATIENT
Start: 2025-05-08 | End: 2025-05-08

## 2025-05-08 RX ORDER — ESCITALOPRAM OXALATE 10 MG/1
10 TABLET ORAL EVERY MORNING
Status: ACTIVE | OUTPATIENT
Start: 2025-05-09

## 2025-05-08 RX ORDER — CALCIUM CARBONATE 500 MG/1
1000 TABLET, CHEWABLE ORAL 4 TIMES DAILY PRN
Status: ACTIVE | OUTPATIENT
Start: 2025-05-08

## 2025-05-08 RX ORDER — LIDOCAINE 40 MG/G
CREAM TOPICAL
Status: ACTIVE | OUTPATIENT
Start: 2025-05-08

## 2025-05-08 RX ORDER — SIMVASTATIN 10 MG
10 TABLET ORAL AT BEDTIME
Status: DISPENSED | OUTPATIENT
Start: 2025-05-08

## 2025-05-08 RX ORDER — ACETAMINOPHEN 650 MG/1
650 SUPPOSITORY RECTAL EVERY 4 HOURS PRN
Status: ACTIVE | OUTPATIENT
Start: 2025-05-08

## 2025-05-08 RX ORDER — ACETAMINOPHEN 325 MG/1
650 TABLET ORAL EVERY 4 HOURS PRN
Status: ACTIVE | OUTPATIENT
Start: 2025-05-08

## 2025-05-08 RX ORDER — AMOXICILLIN 250 MG
2 CAPSULE ORAL 2 TIMES DAILY PRN
Status: ACTIVE | OUTPATIENT
Start: 2025-05-08

## 2025-05-08 RX ORDER — AMOXICILLIN 250 MG
1 CAPSULE ORAL 2 TIMES DAILY PRN
Status: ACTIVE | OUTPATIENT
Start: 2025-05-08

## 2025-05-08 RX ORDER — VANCOMYCIN HYDROCHLORIDE 1 G/200ML
1000 INJECTION, SOLUTION INTRAVENOUS EVERY 12 HOURS
Status: DISPENSED | OUTPATIENT
Start: 2025-05-08

## 2025-05-08 RX ORDER — ASPIRIN 81 MG/1
81 TABLET ORAL EVERY MORNING
Status: ACTIVE | OUTPATIENT
Start: 2025-05-09

## 2025-05-08 RX ORDER — IPRATROPIUM BROMIDE AND ALBUTEROL SULFATE 2.5; .5 MG/3ML; MG/3ML
3 SOLUTION RESPIRATORY (INHALATION) EVERY 4 HOURS PRN
Status: ACTIVE | OUTPATIENT
Start: 2025-05-08

## 2025-05-08 RX ORDER — KETOROLAC TROMETHAMINE 15 MG/ML
15 INJECTION, SOLUTION INTRAMUSCULAR; INTRAVENOUS EVERY 6 HOURS PRN
Status: ACTIVE | OUTPATIENT
Start: 2025-05-08 | End: 2025-05-13

## 2025-05-08 RX ORDER — DEXTROSE MONOHYDRATE 25 G/50ML
25-50 INJECTION, SOLUTION INTRAVENOUS
Status: ACTIVE | OUTPATIENT
Start: 2025-05-08

## 2025-05-08 RX ORDER — ACETAMINOPHEN 325 MG/1
650 TABLET ORAL ONCE
Status: COMPLETED | OUTPATIENT
Start: 2025-05-08 | End: 2025-05-08

## 2025-05-08 RX ORDER — ONDANSETRON 4 MG/1
4 TABLET, ORALLY DISINTEGRATING ORAL EVERY 6 HOURS PRN
Status: ACTIVE | OUTPATIENT
Start: 2025-05-08

## 2025-05-08 RX ORDER — KETOROLAC TROMETHAMINE 30 MG/ML
30 INJECTION, SOLUTION INTRAMUSCULAR; INTRAVENOUS EVERY 6 HOURS PRN
Status: DISPENSED | OUTPATIENT
Start: 2025-05-08 | End: 2025-05-13

## 2025-05-08 RX ORDER — ONDANSETRON 2 MG/ML
4 INJECTION INTRAMUSCULAR; INTRAVENOUS EVERY 6 HOURS PRN
Status: ACTIVE | OUTPATIENT
Start: 2025-05-08

## 2025-05-08 RX ORDER — NICOTINE POLACRILEX 4 MG
15-30 LOZENGE BUCCAL
Status: ACTIVE | OUTPATIENT
Start: 2025-05-08

## 2025-05-08 RX ORDER — SPIRONOLACTONE 25 MG
12.5 TABLET ORAL EVERY MORNING
Status: ACTIVE | OUTPATIENT
Start: 2025-05-09

## 2025-05-08 RX ORDER — BUMETANIDE 2 MG/1
6 TABLET ORAL EVERY MORNING
Status: ACTIVE | OUTPATIENT
Start: 2025-05-09

## 2025-05-08 RX ADMIN — SODIUM CHLORIDE 2500 MG: 0.9 INJECTION, SOLUTION INTRAVENOUS at 12:06

## 2025-05-08 RX ADMIN — SODIUM CHLORIDE 1000 ML: 0.9 INJECTION, SOLUTION INTRAVENOUS at 14:14

## 2025-05-08 RX ADMIN — VANCOMYCIN HYDROCHLORIDE 1000 MG: 1 INJECTION, SOLUTION INTRAVENOUS at 21:26

## 2025-05-08 RX ADMIN — AZITHROMYCIN MONOHYDRATE 500 MG: 500 INJECTION, POWDER, LYOPHILIZED, FOR SOLUTION INTRAVENOUS at 18:41

## 2025-05-08 RX ADMIN — SODIUM CHLORIDE 1000 ML: 0.9 INJECTION, SOLUTION INTRAVENOUS at 12:37

## 2025-05-08 RX ADMIN — SIMVASTATIN 10 MG: 10 TABLET, FILM COATED ORAL at 22:51

## 2025-05-08 RX ADMIN — PIPERACILLIN AND TAZOBACTAM 4.5 G: 4; .5 INJECTION, POWDER, FOR SOLUTION INTRAVENOUS at 20:14

## 2025-05-08 RX ADMIN — POTASSIUM CHLORIDE 40 MEQ: 1500 TABLET, EXTENDED RELEASE ORAL at 15:28

## 2025-05-08 RX ADMIN — KETOROLAC TROMETHAMINE 30 MG: 30 INJECTION, SOLUTION INTRAMUSCULAR at 15:26

## 2025-05-08 RX ADMIN — APIXABAN 5 MG: 5 TABLET, FILM COATED ORAL at 22:51

## 2025-05-08 RX ADMIN — ACETAMINOPHEN 650 MG: 325 TABLET ORAL at 12:54

## 2025-05-08 RX ADMIN — PIPERACILLIN AND TAZOBACTAM 4.5 G: 4; .5 INJECTION, POWDER, FOR SOLUTION INTRAVENOUS at 12:02

## 2025-05-08 ASSESSMENT — ACTIVITIES OF DAILY LIVING (ADL)
ADLS_ACUITY_SCORE: 55
ADLS_ACUITY_SCORE: 58
ADLS_ACUITY_SCORE: 58
ADLS_ACUITY_SCORE: 53
ADLS_ACUITY_SCORE: 58

## 2025-05-08 NOTE — ED NOTES
Patient attempted to use bedpan to have a bowel movement, became very severely dyspneic with minimal exertion of rolling in bed with desaturation to 86%. She recovered very slowly with repositioning upright and titrating O2 to 10 LPM via oxymask. Sats currently 90%. Dr. Marcelo was notified.

## 2025-05-08 NOTE — H&P
[facility]    History and Physical - Hospitalist Service       Date of Admission:  5/8/2025    Assessment & Plan      Bess Enamorado is a 49 year old female with a past medical history of Chronic Hypoxic Respiratory Failure (on 3L NC at home), Pulmonary HTN, Chronic CHF who was admitted on 5/8/25 after presenting to [facility] ED for Sepsis with Hypoxia.      #Severe Sepsis with Multiple Possible Sources  #Bilateral Leg Swelling  - In the ED - WBC 6.2, Lactate 5.1, BP 90s/50s. HR 120s.  - CXR concerning for pulmonary edema - no obvious PNA  - UA with possible UTI. Legs concerning for cellulitis  - US Left LE in the ED - negative for DVT.  Plan  - Given 1L in the ED - adding additional 1L on admission  - Pneumonia microbiology labs ordered  - Blood culture, urine culture pending.  - Vancomycin, Zosyn, Azithromycin   - Trending lactate q6h   - Checking CT A/P for possible abdominal source of infection  - Checking CT of bilateral LE to evaluate for necrotizing infection.    Addendum: BP improved to 120/60 around 6p after 2L fluid bolus given. BP seems to be highly variable depending on cuff size. Follow up lactate from 6p is 1.6 from 2.3. Hold off on additional fluid for now.      #Acute on Chronic Hypoxic Respiratory Failure  #Obesity hypoventilation syndrome.  #Pulmonary hypertension  #Chronic Diastolic CHF  - Home HF Meds: Spironolactone, Bumex  - Patient uses 3L NC at home -> 10L OxyMask -> BIPAP in ED.  - In the ED - BNP 2500 and Troponin negative. VBG with pH 7.31 and CO2 58.  - CXR with pulmonary edema concerning for fluid overload.  Plan  - Will continue with IV fluids given hypotension with sepsis but will administer slowly.  - Holding home spironolactone and bumex.  - Cardiology consulted for likely HF exacerbation  - Checking Echo TTE     #Recent DVT, LLE anticoagulated on Eliquis.  #Chronic lymphedema  - US 3/23/25 showing DVT in LLE - started on Apixaban.  Plan  - Lymphedema consult  -  Checking CT of lower extremities for signs of cellulitis  - Continue home Apixaban    #Prediabetes  - Last A1c 5.6%  - Takes empagliflozin at home  Plan  - Follow blood sugars and sliding scale insulin     #Depression -  Continue home Lexapro.  #HLD - Continue home simvastatin.        Diet: Low Saturated Fat Na <2400 mg  DVT Prophylaxis: DOAC and Pneumatic Compression Devices  Brar Catheter: Not present  Lines: None     Cardiac Monitoring: ACTIVE order. Indication: Tachyarrhythmias, acute (48 hours)  Code Status: Full Code      Clinically Significant Risk Factors Present on Admission                # Drug Induced Coagulation Defect: home medication list includes an anticoagulant medication  # Drug Induced Platelet Defect: home medication list includes an antiplatelet medication   # Hypertension: Noted on problem list  # Chronic heart failure with preserved ejection fraction: heart failure noted on problem list and last echo with EF >50%              # Financial/Environmental Concerns:           Disposition Plan     Medically Ready for Discharge: Anticipated in 5+ Days           Cody Marcelo MD  Hospitalist Service  [facility]  Securely message with [employer] (more info)  Text page via [employer] Paging/Directory     ______________________________________________________________________    Chief Complaint   Shortness of Breath    History is obtained from the patient, electronic health record, and emergency department physician    History of Present Illness   Bess Enamorado is a 49 year old female with a past medical history of Chronic Hypoxic Respiratory Failure (on 3L NC at home), Pulmonary HTN, Chronic CHF who was admitted on 5/8/25 after presenting to [facility] ED for Sepsis with Hypoxia.    Per chart review patient recently admitted from 4/2-/7/25 for recurrent cellulitis of the left lower leg.  During hospitalization patient had negative blood cultures and was treated with Zosyn eventually  transitioned to Augmentin on discharge.  Also continued on Eliquis for known left lower extremity DVT seen 03/2025.    Patient was feeling well until early this morning around 8:30 AM on 5/8 patient began having fever, chills, rigors.  Patient had few episodes of fever and presented to the ED for further evaluation.  Patient with may be some dysuria but no hematuria.  No shortness of breath, coughing, sore throat that might indicate pneumonia.  No diarrhea.      Past Medical History    Past Medical History:   Diagnosis Date    Acute on chronic diastolic congestive heart failure (H) 02/09/2022    Arthritis 2019    Hips    ASCUS favor benign 08/2015    Neg HPV    Deep vein thrombosis (DVT) of left lower extremity (H) 03/25/2025    Depressive disorder 2010    H/O colposcopy with cervical biopsy 09/14/2015    Bx & ECC - negative    Hypertension 2019    LSIL on Pap smear 07/2014    Neg high risk HPV    Multiple sclerosis (H) 08/29/2005 9/18/200pt dx with MS 2004--dx by neurolgist and is followed by Dr. Harris    Myocardial infarction (H)     Obese     Pneumonia due to infectious organism, unspecified laterality, unspecified part of lung 02/08/2022    Sepsis (Temp 101, , WBC 13.0) 10/23/2018    Shingles 10/2016    SIRS (systemic inflammatory response syndrome) (H) 03/04/2021    Sleep apnea     UNSPEC CONSTIPATION 09/18/2006 9/18/2006   Has tried otc suppository and otc lax.        Past Surgical History   Past Surgical History:   Procedure Laterality Date    CHOLECYSTECTOMY, LAPOROSCOPIC      Cholecystectomy, Laparoscopic    COLONOSCOPY  2007?    Bathroom issue..results normal    COMBINED CYSTOSCOPY, RETROGRADES, EXCHANGE STENT URETER(S) Right 2/21/2022    Procedure: CYSTOSCOPY, WITH right RETROGRADE PYELOGRAM AND right URETERAL STENT PLACEMENT;  Surgeon: Doyle Palomares MD;  Location: [facility] OR    OPEN REDUCTION INTERNAL FIXATION TOE(S)  4/13/2012    Procedure:OPEN REDUCTION INTERNAL FIXATION TOE(S);  Open reduction internal fixation right proximal fifth metatarsal fracture-   Anes-choice block; Surgeon:LEY, JEFFREY DUANE; Location:[facility] OR    PICC SINGLE LUMEN PLACEMENT  3/20/2024    PICC TRIPLE LUMEN PLACEMENT  2/21/2022            Prior to Admission Medications   Prior to Admission Medications   Prescriptions Last Dose Informant Patient Reported? Taking?   Emollient (GOLD BOND MEDICATED BODY EX)  Self Yes Yes   Sig: Externally apply 1 Application topically 2 times daily as needed   acetaminophen (TYLENOL) 650 MG CR tablet   Yes Yes   Sig: Take 1,300 mg by mouth every 8 hours as needed for mild pain or fever.   apixaban ANTICOAGULANT (ELIQUIS) 5 MG tablet 5/7/2025 Evening  No Yes   Sig: Take 1 tablet (5 mg) by mouth 2 times daily.   aspirin 81 MG EC tablet 5/7/2025 Morning  Yes Yes   Sig: Take 81 mg by mouth every morning.   bumetanide (BUMEX) 2 MG tablet 5/7/2025 Morning  Yes Yes   Sig: Take 6 mg by mouth every morning.   empagliflozin (JARDIANCE) 10 MG TABS tablet 5/7/2025 Morning  Yes Yes   Sig: Take 10 mg by mouth every morning.   escitalopram (LEXAPRO) 10 MG tablet 5/7/2025 Morning  Yes Yes   Sig: Take 10 mg by mouth every morning.   miconazole (MICATIN) 2 % external powder   Yes Yes   Sig: Apply topically 2 times daily as needed for itching or other.   potassium chloride sheila ER (KLOR-CON M10) 10 MEQ CR tablet 5/7/2025 Morning  Yes Yes   Sig: Take 30 mEq by mouth every morning.   sennosides (SENOKOT) 8.6 MG tablet   No Yes   Sig: Take 1 tablet by mouth 2 times daily as needed for constipation.   simvastatin (ZOCOR) 10 MG tablet 5/7/2025 Bedtime  No Yes   Sig: Take 1 tablet (10 mg) by mouth at bedtime.   spironolactone (ALDACTONE) 25 MG tablet 5/7/2025 Morning  Yes Yes   Sig: Take 12.5 mg by mouth every morning.      Facility-Administered Medications: None        Social History   I have reviewed this patient's social history and updated it with pertinent information if needed.  Social History     Tobacco Use     Smoking status: Never    Smokeless tobacco: Never   Vaping Use    Vaping status: Never Used   Substance Use Topics    Alcohol use: Yes     Comment: social    Drug use: No         Family History   I have reviewed this patient's family history and updated it with pertinent information if needed.  Family History   Problem Relation Age of Onset    Neurologic Disorder Father         MS    Heart Disease Father         irregular heart rate    Diabetes Father     Melanoma Father     Sleep Apnea Father     Other Cancer Father     Cancer Maternal Grandfather         lung    Other Cancer Maternal Grandfather     Cancer Paternal Grandmother         stomach    Other Cancer Paternal Grandmother     Cancer Mother     Other Cancer Mother     Thyroid Disease Mother     Gynecology Maternal Aunt         PCOS    Hypertension Maternal Grandmother     Anxiety Disorder Maternal Grandmother     Thyroid Disease Maternal Grandmother     Anxiety Disorder Sister          Allergies   No Known Allergies     Physical Exam   Vital Signs: Temp: 101.3  F (38.5  C) Temp src: Rectal BP: 96/54 Pulse: 109   Resp: 25 SpO2: 97 % O2 Device: (S) BiPAP/CPAP (V60) Oxygen Delivery: 6 LPM  Weight: 0 lbs 0 oz    General: Alert and Oriented x3. Answers questions appropriately. Follows commands.  Eyes:EOMI. PERRL. Normal Conjunctivae.  HENT: Normocephalic. Atraumatic.  Resp: Decreased breath sounds at bases bilaterally  Cardio: Regular rate and rhythm. No murmurs. No friction rub.  Abd: Soft. Non-distended. Non-tender. Bowel sounds normal. No rebound tenderness.  MSK:  - Large lower extremities, with areas of erythema and tenderness bilaterally.  Neuro: CN 2-12 grossly intact. Strength 5/5 in all 4 extremities.  Skin:No rashes. No jaundice.       Medical Decision Making       75 MINUTES SPENT BY ME on the date of service doing chart review, history, exam, documentation & further activities per the note.  MANAGEMENT DISCUSSED with the following over the past 24  hours: RN, ED Physician   Tests ORDERED & REVIEWED in the past 24 hours:  - BMP  - CBC  - BNP  - Lactic Acid  - Magnesium  - Troponin  - VBG  Medical complexity over the past 24 hours:  - Decision regarding ESCALATION OF LEVEL OF CARE  - Prescription DRUG MANAGEMENT performed      Data     I have personally reviewed the following data over the past 24 hrs:    6.2  \   14.4   / 205     139 98 22.8 (H) /  98   3.4 29 0.83 \     ALT: 8 AST: 15 AP: 78 TBILI: 2.3 (H)   ALB: 4.0 TOT PROTEIN: 8.3 LIPASE: N/A     Trop: 14 BNP: 2,556     Procal: N/A CRP: N/A Lactic Acid: 2.3 (H)         Imaging results reviewed over the past 24 hrs:   Recent Results (from the past 24 hours)   XR Chest 2 Views    Narrative    EXAM: XR CHEST 2 VIEWS  LOCATION: [facility]  DATE: 5/8/2025    INDICATION: Fever  COMPARISON: 3/22/2025      Impression    IMPRESSION: The lungs are hypoinflated with patchy bibasilar atelectasis. Mild elevation of the right hemidiaphragm. Cardiomegaly and mild pulmonary vascular congestion without overt pulmonary edema. No acute osseous findings.   US Lower Extremity Venous Duplex Left    Narrative    EXAM: US LOWER EXTREMITY VENOUS DUPLEX LEFT  LOCATION: [facility]  DATE: 5/8/2025    INDICATION: Left leg swelling and pain  COMPARISON: None.  TECHNIQUE: Venous Duplex ultrasound of the left lower extremity with and without compression, augmentation and duplex. Color flow and spectral Doppler with waveform analysis performed.    FINDINGS: Exam includes the common femoral, femoral, popliteal, and contralateral common femoral veins as well as segmentally visualized deep calf veins and greater saphenous vein.     The exam is limited due to the patient's body habitus and pain. The peroneal veins and lower femoral vein are not clearly visualized due to these technical limitations.    LEFT: No deep vein thrombosis. No superficial thrombophlebitis. No popliteal cyst.       Impression    IMPRESSION:  1.  No deep venous thrombosis in the left lower extremity. Technical limitations as described above.

## 2025-05-08 NOTE — ED NOTES
Received critical lab value, lactic acid on VBG 5.1. Dr. Givens notified. Clarifying order for IVF volume due to code sepsis. Per provider, start with 1L bolus NS due to respiratory status.

## 2025-05-08 NOTE — ED TRIAGE NOTES
Patient arrives to ER via EMS from home.  This morning about 830/9am, suddenly had left lower leg pain from knee down.  Became warm to touch and swollen.  Home health aide was at the house for routine wound care and was found to be cyanotic, shortness of breath, with increasing leg pain.     Hx; cellulitis, CHF. On 3L O2 at baseline.     Home health aide for laceration on back of upper left leg.

## 2025-05-08 NOTE — ED NOTES
Rectal temp was 101.3. Increasing oxygen needs up to 8L oxymask with 93-95% sats.  Tachypneic. Alert and oriented at this time.     CODE SEPSIS called. 2 IV's and abx started at this time.

## 2025-05-08 NOTE — PHARMACY-VANCOMYCIN DOSING SERVICE
Pharmacy Vancomycin Initial Note  Date of Service May 8, 2025  Patient's  1975  49 year old, female    Indication: Sepsis    Current estimated CrCl = Estimated Creatinine Clearance: 101.9 mL/min (based on SCr of 0.83 mg/dL).    Creatinine for last 3 days  2025: 11:48 AM Creatinine 0.83 mg/dL    Recent Vancomycin Level(s) for last 3 days  No results found for requested labs within last 3 days.      Vancomycin IV Administrations (past 72 hours)                     vancomycin (VANCOCIN) 2,500 mg in 0.9% NaCl 525 mL intermittent infusion (mg) 2,500 mg New Bag 25 1206                    Nephrotoxins and other renal medications (From now, onward)      Start     Dose/Rate Route Frequency Ordered Stop    25 1800  piperacillin-tazobactam (ZOSYN) 4.5 g vial to attach to  mL bag         4.5 g  over 30 Minutes Intravenous EVERY 6 HOURS 25 1504              Contrast Orders - past 72 hours (72h ago, onward)      None            [employer] Prediction of Planned Initial Vancomycin Regimen  Loading dose: N/A  Regimen: 1000 mg IV every 12 hours.  Start time: 00:06 on 2025  Exposure target: AUC24 (range)400-600 mg/L.hr   AUC24,ss: 523 mg/L.hr  Probability of AUC24 > 400: 69 %  Ctrough,ss: 15.3 mg/L  Probability of Ctrough,ss > 20: 36 %  Probability of nephrotoxicity (Lodise ARMANDO ): 11 %        Plan:  Start vancomycin  1000 mg IV q12h.   Vancomycin monitoring method: AUC  Vancomycin therapeutic monitoring goal: 400-600 mg*h/L  Pharmacy will check vancomycin levels as appropriate in 1-3 Days.    Serum creatinine levels will be ordered daily for the first week of therapy and at least twice weekly for subsequent weeks.      Anali Maynard, PharmD, BCPS 25 3:14 PM

## 2025-05-08 NOTE — TELEPHONE ENCOUNTER
Forwarding status update to PCP as ADY Mackey RN with [facility] calls with patient status update.    Key was scheduled to see the patient today and went out to the home.  When she arrived, patient had symptoms of shaking chills, fever, red leg, and had already called EMS for possible cellulitis of L leg.  She's on her way to [facility] for evaluation.    Thanks,  Summer Laguerre RN  [facility]

## 2025-05-08 NOTE — PHARMACY-VANCOMYCIN DOSING SERVICE
Pharmacy Vancomycin Initial Note  Date of Service May 8, 2025  Patient's  1975  49 year old, female    Indication: Sepsis    Current estimated CrCl = Estimated Creatinine Clearance: 98.3 mL/min (based on SCr of 0.86 mg/dL).    Creatinine for last 3 days  No results found for requested labs within last 3 days.    Recent Vancomycin Level(s) for last 3 days  No results found for requested labs within last 3 days.      Vancomycin IV Administrations (past 72 hours)        No vancomycin orders with administrations in past 72 hours.                    Nephrotoxins and other renal medications (From now, onward)      Start     Dose/Rate Route Frequency Ordered Stop    25 1200  piperacillin-tazobactam (ZOSYN) 4.5 g vial to attach to  mL bag         4.5 g  over 30 Minutes Intravenous ONCE 25 1140      25 1200  vancomycin (VANCOCIN) 2,500 mg in 0.9% NaCl 525 mL intermittent infusion         2,500 mg  over 120 Minutes Intravenous ONCE 25 1145              Contrast Orders - past 72 hours (72h ago, onward)      None              Plan:  Start vancomycin  2500 mg IV as a one time ~ 22 mg/kg loading dose.   Please reconsult pharmacy if vancomycin therapy is desired beyond this one time loading dose. Thank you.   Alexandre Rivers [facility]

## 2025-05-08 NOTE — ED NOTES
[facility] ED Handoff Report    ED Chief Complaint: leg swelling, fever    ED Diagnosis:  (A41.9,  R65.20) Severe sepsis (H)         PMH:    Past Medical History:   Diagnosis Date    Acute on chronic diastolic congestive heart failure (H) 02/09/2022    Arthritis 2019    Hips    ASCUS favor benign 08/2015    Neg HPV    Deep vein thrombosis (DVT) of left lower extremity (H) 03/25/2025    Depressive disorder 2010    H/O colposcopy with cervical biopsy 09/14/2015    Bx & ECC - negative    Hypertension 2019    LSIL on Pap smear 07/2014    Neg high risk HPV    Multiple sclerosis (H) 08/29/2005 9/18/200pt dx with MS 2004--dx by neurolgist and is followed by Dr. Harris    Myocardial infarction (H)     Obese     Pneumonia due to infectious organism, unspecified laterality, unspecified part of lung 02/08/2022    Sepsis (Temp 101, , WBC 13.0) 10/23/2018    Shingles 10/2016    SIRS (systemic inflammatory response syndrome) (H) 03/04/2021    Sleep apnea     UNSPEC CONSTIPATION 09/18/2006 9/18/2006   Has tried otc suppository and otc lax.         Code Status:  Full Code     Falls Risk: Yes Band: Applied    Current Living Situation/Residence: lives alone     Elimination Status: Continent: baseline continent. Prima-fit in ED     Activity Level: Total assist/lift    Patients Preferred Language:  English     Needed: No    Vital Signs:  /64   Pulse 100   Temp 101.3  F (38.5  C) (Rectal)   Resp 25   LMP 04/08/2025 (Within Days)   SpO2 99%      Cardiac Rhythm: ST    Pain Score: 5/10    Is the Patient Confused:  No    Last Food or Drink: 05/08/25     Focused Assessment:  sudden onset shortness of breath, fever, and tremulousness this morning with increased leg swelling and pain. Patient has lymphedema at baseline. Today LLE very tender, warm, with increased swelling and blanchable redness from the medial thigh down to the calf. Tachypneic and severely dyspneic with exertion, noted to have  cyanosis to the face, lips, hand and fingertips bilaterally. At baseline patient is on 3L via NC, but was hypoxic on this setting. Oxymask was titrated to 8 LPM but patient had prolonged desaturation after trying to use bedpan at 1555 and did not recover well with titrating O2. She was evaluated by RT and trialed on CPAP but failed, and is now on BiPAP at 45% FiO2.     Tests Performed: Done: Labs and Imaging    Treatments Provided:  see MAR    Family Dynamics/Concerns: No    Family Updated On Visitor Policy: No    Plan of Care Communicated to Family: No    Who Was Updated about Plan of Care: patient to update at her own discretion    Belongings Checklist Done and Signed by Patient: Yes    Medications sent with patient: cell phone, cell phone , ipad, purse, wallet, cash, credit/debit cards    Covid: asymptomatic , not tested    Additional Information: awaiting azithromycin from pharmacy as it has not yet been received by Jennifer Richard RN 5/8/2025 6:24 PM

## 2025-05-08 NOTE — ED NOTES
Patient's azithromycin is nowhere to be found. Not in drawer, patient's room, nursing station or tube station. Pharmacy note sent to resend.

## 2025-05-08 NOTE — ED NOTES
Bed: [facility]-  Expected date:   Expected time:   Means of arrival:   Comments:  [facility]- left leg swelling temp 100

## 2025-05-08 NOTE — MEDICATION SCRIBE - ADMISSION MEDICATION HISTORY
Medication Scribe Admission Medication History    Admission medication history is complete. The information provided in this note is only as accurate as the sources available at the time of the update.    Information Source(s): Patient via in-person    Pertinent Information: Patient reports self management of medications.     Patient reports daily medication adherence. Inquired about apparent discrepancy from Sure script data and patient reports daily medication adherence.     Changes made to PTA medication list:  Added: None  Deleted: Valtrex (completed)  Changed: None    Allergies reviewed with patient and updates made in EHR: yes    Medication History Completed By: Elias Gibbs 5/8/2025 12:21 PM    PTA Med List   Medication Sig Last Dose/Taking    acetaminophen (TYLENOL) 650 MG CR tablet Take 1,300 mg by mouth every 8 hours as needed for mild pain or fever. Taking As Needed    apixaban ANTICOAGULANT (ELIQUIS) 5 MG tablet Take 1 tablet (5 mg) by mouth 2 times daily. 5/7/2025 Evening    aspirin 81 MG EC tablet Take 81 mg by mouth every morning. 5/7/2025 Morning    bumetanide (BUMEX) 2 MG tablet Take 6 mg by mouth every morning. 5/7/2025 Morning    Emollient (GOLD BOND MEDICATED BODY EX) Externally apply 1 Application topically 2 times daily as needed Taking As Needed    empagliflozin (JARDIANCE) 10 MG TABS tablet Take 10 mg by mouth every morning. 5/7/2025 Morning    escitalopram (LEXAPRO) 10 MG tablet Take 10 mg by mouth every morning. 5/7/2025 Morning    miconazole (MICATIN) 2 % external powder Apply topically 2 times daily as needed for itching or other. Taking As Needed    potassium chloride sheila ER (KLOR-CON M10) 10 MEQ CR tablet Take 30 mEq by mouth every morning. 5/7/2025 Morning    sennosides (SENOKOT) 8.6 MG tablet Take 1 tablet by mouth 2 times daily as needed for constipation. Taking As Needed    simvastatin (ZOCOR) 10 MG tablet Take 1 tablet (10 mg) by mouth at bedtime. 5/7/2025 Bedtime    spironolactone  (ALDACTONE) 25 MG tablet Take 12.5 mg by mouth every morning. 5/7/2025 Morning

## 2025-05-08 NOTE — ED NOTES
Bed: [facility]NED-23  Expected date:   Expected time:   Means of arrival:   Comments:  Hold for Bebo STALEY

## 2025-05-08 NOTE — ED PROVIDER NOTES
EMERGENCY DEPARTMENT ENCOUNTER     NAME: Bess Enamorado   AGE: 49 year old female   YOB: 1975   MRN: [id number]   EVALUATION DATE & TIME: 5/8/2025 11:31 AM   PCP: Mikala Luna     Chief Complaint   Patient presents with    Leg Swelling    Fever   :    FINAL IMPRESSION       1. Severe sepsis (H)           ED COURSE & MEDICAL DECISION MAKING      Pertinent Labs & Imaging studies reviewed. (See chart for details)   49 year old female  presents to the Emergency Department for evaluation of multiple issues.  Initially she was complaining of pain all over which was the EMS call, but she was found to be mottled, cyanotic appearing, and tremulous which to me appeared to look like rigors.  She is morbidly obese with lymphedema of her lower extremities, and I do not actually visualize a wound or overt cellulitis, but her left lower extremity is diffusely red.  She also recently had a urinalysis concerning for UTI and was started antibiotics, but culture actually was negative and antibiotics were discontinued.  Rectal temp confirmed my suspicions with a fever of 101.3.  Sepsis workup was initiated and I did order broad-spectrum antibiotics with an unknown source.  Her urinalysis today actually looks better than what it looked at the last visit, so I am less suspicious of a urinary source.  She is on 3 L nasal cannula at baseline, and had some tachypnea here but clinically I actually suspect that this was more compensation for an acidotic state as she is trying to make up for the fact that she has a significant lactic acidosis with an initial lactate of 5.  She does also have a history of CHF and is on Bumex daily, so I do not think she is a candidate for a full 30 cc/kg IV fluid bolus quickly.  I did give her 1 L which she tolerated in terms of respiratory status, and now with the very first blood pressures since arrival 3 hours ago just around  systolic I am going to give some additional fluids.  Blood  and urine cultures are sent.  Viral panel is negative.  Chest x-ray is actually still pending due to a radiology delay.  Ultimately, she needs to be admitted for severe sepsis, currently of unknown etiology.  Certainly it is possible that it is related to her left lower extremity and skin, but it is a very difficult determination given the chronic appearance of it as well. Initial Vitals Reviewed.  Case discussed with hospitalist who accepted the patient for admission           At the conclusion of the encounter I discussed the results of all of the tests and the disposition. The questions were answered. The patient or family acknowledged understanding and was agreeable with the care plan.     60 minutes critical care time, see procedure note below for details if relevant    Medical Decision Making  I obtained history from EMS  I reviewed the EMR: Inpatient Record: Admission for lymphedema and left leg cellulitis for [facility]  Care impacted by Heart Disease  I discussed the care with another health care provider: hospitalist  Admit.    MIPS (CTPE, Dental pain, Brar, Sinusitis, Asthma/COPD, Head Trauma): Not Applicable    SEPSIS: The patient has signs of sepsis   Sepsis ED evaluation   The patient has signs of sepsis as evidenced by:  1. Presence of 2 SIRS criteria, suspected infection, AND  2. Organ dysfunction: Lactic Acidosis with value >2.0 due to sepsis    Sepsis Care Initiation: Starting at 1200 PM on 05/08/25, until specified. Prior to this documentation, sepsis, severe sepsis, or septic shock was NOT thought to be a significant cause of illness. This order represents the first time infection was seriously considered to be affecting the patient.    Lactic Acid Results:  Recent Labs   Lab Test 05/08/25  1350 05/08/25  1200 05/08/25  1148   LACT 2.3* 5.1* 5.0*       3 Hour Bundle 6 Hour Bundle (Reassessment)   Blood Cultures before IV Antibiotics: Yes  Antibiotics given: see below  Prehospital fluid volume (mL):                      Total fluids given (ED +Pre-hospital):  Full 30 mL/kg bolus intentionally NOT administered to this patient due to CHF and acute Pulmonary Edema. The target volume to infuse in this patient is 1L.   Repeat Lactic Acid Level: Ordered by reflex for 2 hours after initial lactic acid collection.  Vasopressors: MAP>65 after initial IVF bolus, will continue to monitor fluid status and vital signs.  Repeat perfusion exam: I attest to having performed a repeat sepsis exam and assessment of perfusion at 2:30 PM .   BMI Readings from Last 1 Encounters:   04/15/25 44.25 kg/m        Anti-infectives (From admission through now)      Start     Dose/Rate Route Frequency Ordered Stop    05/08/25 1200  piperacillin-tazobactam (ZOSYN) 4.5 g vial to attach to  mL bag         4.5 g  over 30 Minutes Intravenous ONCE 05/08/25 1140 05/08/25 1234    05/08/25 1200  vancomycin (VANCOCIN) 2,500 mg in 0.9% NaCl 525 mL intermittent infusion         2,500 mg  over 120 Minutes Intravenous ONCE 05/08/25 1145 05/08/25 1406                                    MEDICATIONS GIVEN IN THE EMERGENCY:   Medications   sodium chloride (PF) 0.9% PF flush 3 mL (has no administration in time range)   sodium chloride (PF) 0.9% PF flush 3 mL (has no administration in time range)   piperacillin-tazobactam (ZOSYN) 4.5 g vial to attach to  mL bag (has no administration in time range)   acetaminophen (TYLENOL) tablet 650 mg (has no administration in time range)      NEW PRESCRIPTIONS STARTED AT TODAY'S ER VISIT   New Prescriptions    No medications on file     ================================================================   HISTORY OF PRESENT ILLNESS       Patient information was obtained from: Patient and EMS  Use of Intrepreter: N/A   Bessedy Enamorado is a 49 year old female with history of multiple sclerosis, recent shingles now off antiviral therapy who presents via EMS for evaluation of allover pain and chills.  Apparently her home health  nurse came out to check on her left leg wound today and found her to have an oral temp of 99.8.  Patient has chills and feels all over body pain and feels like she has a fever subjectively.  She states that recently she had a urine culture that did not grow positive for a UTI, but she does have urinary symptoms and feels like she has a UTI.    ================================================================        PAST HISTORY     PAST MEDICAL HISTORY:   Past Medical History:   Diagnosis Date    Acute on chronic diastolic congestive heart failure (H) 02/09/2022    Arthritis 2019    Hips    ASCUS favor benign 08/2015    Neg HPV    Deep vein thrombosis (DVT) of left lower extremity (H) 03/25/2025    Depressive disorder 2010    H/O colposcopy with cervical biopsy 09/14/2015    Bx & ECC - negative    Hypertension 2019    LSIL on Pap smear 07/2014    Neg high risk HPV    Multiple sclerosis (H) 08/29/2005 9/18/200pt dx with MS 2004--dx by neurolgist and is followed by Dr. Harris    Myocardial infarction (H)     Obese     Pneumonia due to infectious organism, unspecified laterality, unspecified part of lung 02/08/2022    Sepsis (Temp 101, , WBC 13.0) 10/23/2018    Shingles 10/2016    SIRS (systemic inflammatory response syndrome) (H) 03/04/2021    Sleep apnea     UNSPEC CONSTIPATION 09/18/2006 9/18/2006   Has tried otc suppository and otc lax.       PAST SURGICAL HISTORY:   Past Surgical History:   Procedure Laterality Date    CHOLECYSTECTOMY, LAPOROSCOPIC      Cholecystectomy, Laparoscopic    COLONOSCOPY  2007?    Bathroom issue..results normal    COMBINED CYSTOSCOPY, RETROGRADES, EXCHANGE STENT URETER(S) Right 2/21/2022    Procedure: CYSTOSCOPY, WITH right RETROGRADE PYELOGRAM AND right URETERAL STENT PLACEMENT;  Surgeon: Doyle Palomares MD;  Location: [facility] [place], [place] OR    OPEN REDUCTION INTERNAL FIXATION TOE(S)  4/13/2012    Procedure:OPEN REDUCTION INTERNAL FIXATION TOE(S); Open reduction internal fixation  right proximal fifth metatarsal fracture-   Anes-choice block; Surgeon:LEY, JEFFREY DUANE; Location:[place] OR    PICC SINGLE LUMEN PLACEMENT  3/20/2024    PICC TRIPLE LUMEN PLACEMENT  2/21/2022           CURRENT MEDICATIONS:   acetaminophen (TYLENOL) 650 MG CR tablet  apixaban ANTICOAGULANT (ELIQUIS) 5 MG tablet  aspirin 81 MG EC tablet  bumetanide (BUMEX) 2 MG tablet  Emollient (GOLD BOND MEDICATED BODY EX)  empagliflozin (JARDIANCE) 10 MG TABS tablet  escitalopram (LEXAPRO) 10 MG tablet  miconazole (MICATIN) 2 % external powder  potassium chloride sheila ER (KLOR-CON M10) 10 MEQ CR tablet  sennosides (SENOKOT) 8.6 MG tablet  simvastatin (ZOCOR) 10 MG tablet  spironolactone (ALDACTONE) 25 MG tablet  valACYclovir (VALTREX) 1000 mg tablet      ALLERGIES:   No Known Allergies   FAMILY HISTORY:   Family History   Problem Relation Age of Onset    Neurologic Disorder Father         MS    Heart Disease Father         irregular heart rate    Diabetes Father     Melanoma Father     Sleep Apnea Father     Other Cancer Father     Cancer Maternal Grandfather         lung    Other Cancer Maternal Grandfather     Cancer Paternal Grandmother         stomach    Other Cancer Paternal Grandmother     Cancer Mother     Other Cancer Mother     Thyroid Disease Mother     Gynecology Maternal Aunt         PCOS    Hypertension Maternal Grandmother     Anxiety Disorder Maternal Grandmother     Thyroid Disease Maternal Grandmother     Anxiety Disorder Sister       SOCIAL HISTORY:   Social History     Socioeconomic History    Marital status: Single   Occupational History     Employer: [employer]   Tobacco Use    Smoking status: Never    Smokeless tobacco: Never   Vaping Use    Vaping status: Never Used   Substance and Sexual Activity    Alcohol use: Yes     Comment: social    Drug use: No    Sexual activity: Not Currently     Partners: Male     Birth control/protection: Pill   Other Topics Concern    Parent/sibling w/ CABG, MI or angioplasty  before 65F 55M? No   Social History Narrative    , 3rd-5th grade     Social Drivers of Health     Financial Resource Strain: Low Risk  (4/3/2025)    Financial Resource Strain     Within the past 12 months, have you or your family members you live with been unable to get utilities (heat, electricity) when it was really needed?: No   Food Insecurity: Low Risk  (4/3/2025)    Food Insecurity     Within the past 12 months, did you worry that your food would run out before you got money to buy more?: No     Within the past 12 months, did the food you bought just not last and you didn t have money to get more?: No   Recent Concern: Food Insecurity - High Risk (3/23/2025)    Food Insecurity     Within the past 12 months, did you worry that your food would run out before you got money to buy more?: Yes     Within the past 12 months, did the food you bought just not last and you didn t have money to get more?: No   Transportation Needs: High Risk (4/3/2025)    Transportation Needs     Within the past 12 months, has lack of transportation kept you from medical appointments, getting your medicines, non-medical meetings or appointments, work, or from getting things that you need?: Yes   Physical Activity: Inactive (3/21/2024)    Exercise Vital Sign     Days of Exercise per Week: 0 days     Minutes of Exercise per Session: 0 min   Stress: No Stress Concern Present (12/4/2020)    [employer] - Occupational Stress Questionnaire     Feeling of Stress : Only a little   Social Connections: Unknown (3/1/2022)    Social Connection and Isolation Panel [NHANES]     Frequency of Communication with Friends and Family: Twice a week     Frequency of Social Gatherings with Friends and Family: Twice a week   Interpersonal Safety: Low Risk  (4/3/2025)    Interpersonal Safety     Do you feel physically and emotionally safe where you currently live?: Yes     Within the past 12 months, have you  been hit, slapped, kicked or otherwise physically hurt by someone?: No     Within the past 12 months, have you been humiliated or emotionally abused in other ways by your partner or ex-partner?: No   Recent Concern: Interpersonal Safety - High Risk (3/23/2025)    Interpersonal Safety     Do you feel physically and emotionally safe where you currently live?: No     Within the past 12 months, have you been hit, slapped, kicked or otherwise physically hurt by someone?: No     Within the past 12 months, have you been humiliated or emotionally abused in other ways by your partner or ex-partner?: No   Housing Stability: Low Risk  (4/3/2025)    Housing Stability     Do you have housing? : Yes     Are you worried about losing your housing?: No        VITALS  No data found.     ================================================================    PHYSICAL EXAM     VITAL SIGNS: LMP 04/08/2025 (Within Days)    Constitutional:  Awake, ill appearing with rigors, mottling of skin and cyanosis  HENT:  Atraumatic, oropharynx without exudate or erythema, membranes moist  Lymph:  No adenopathy  Eyes: EOM intact, PERRL, no injection  Neck: Supple  Respiratory:  Clear to auscultation bilaterally, no wheezes or crackles   Cardiovascular: tachycardia,  Regular  rhythm, single S1 and S2   GI:  Soft, nontender, morbidly obese, no rebound or guarding   Musculoskeletal:  Moves all extremities, lymphedema lower extremities left greater than right.  On the posterior left leg there is a bandage that Home health has been monitoring, but under which I do not actually see any significant wound or signs of cellulitis  Skin:  Warm, dry  Neurologic:  Alert and oriented x3, no focal deficits noted       ================================================================  LAB       All pertinent labs reviewed and interpreted.   Labs Ordered and Resulted from Time of ED Arrival to Time of ED Departure   COMPREHENSIVE METABOLIC PANEL - Abnormal       Result  Value    Sodium 139      Potassium 3.4      Carbon Dioxide (CO2) 29      Anion Gap 12      Urea Nitrogen 22.8 (*)     Creatinine 0.83      GFR Estimate 85      Calcium 10.0      Chloride 98      Glucose 98      Alkaline Phosphatase 78      AST 15      ALT 8      Protein Total 8.3      Albumin 4.0      Bilirubin Total 2.3 (*)    LACTIC ACID WHOLE BLOOD WITH 1X REPEAT IN 2 HR WHEN >2 - Abnormal    Lactic Acid, Initial 5.0 (*)    BLOOD GAS VENOUS - Abnormal    pH Venous 7.31 (*)     pCO2 Venous 58 (*)     pO2 Venous 23 (*)     Bicarbonate Venous 29 (*)     Base Excess/Deficit Venous 0.8      FIO2 60      Oxyhemoglobin Venous 30 (*)     O2 Sat, Venous 30.4 (*)     Lactic Acid 5.1 (*)    ROUTINE UA WITH MICROSCOPIC REFLEX TO CULTURE - Abnormal    Color Urine Light Yellow      Appearance Urine Clear      Glucose Urine >1000 (*)     Bilirubin Urine Negative      Ketones Urine Negative      Specific Gravity Urine 1.017      Blood Urine Negative      pH Urine 7.0      Protein Albumin Urine 20 (*)     Urobilinogen Urine Normal      Nitrite Urine Negative      Leukocyte Esterase Urine 75 Gigi/uL (*)     WBC Clumps Urine Present (*)     RBC Urine 1      WBC Urine 10 (*)     Squamous Epithelials Urine 3 (*)     Hyaline Casts Urine 1     CBC WITH PLATELETS AND DIFFERENTIAL - Abnormal    WBC Count 6.2      RBC Count 4.94      Hemoglobin 14.4      Hematocrit 45.6      MCV 92      MCH 29.1      MCHC 31.6      RDW 16.8 (*)     Platelet Count 205      % Neutrophils 82      % Lymphocytes 12      % Monocytes 4      % Eosinophils 1      % Basophils 1      % Immature Granulocytes 0      NRBCs per 100 WBC 0      Absolute Neutrophils 5.1      Absolute Lymphocytes 0.7 (*)     Absolute Monocytes 0.3      Absolute Eosinophils 0.1      Absolute Basophils 0.0      Absolute Immature Granulocytes 0.0      Absolute NRBCs 0.0     LACTIC ACID WHOLE BLOOD - Abnormal    Lactic Acid 2.3 (*)    INFLUENZA A/B, RSV AND SARS-COV2 PCR - Normal    Influenza A  PCR Negative      Influenza B PCR Negative      RSV PCR Negative      SARS CoV2 PCR Negative     BLOOD CULTURE   URINE CULTURE   BLOOD CULTURE        ===============================================================  RADIOLOGY       Reviewed all pertinent imaging. Please see official radiology report.   XR Chest 2 Views    (Results Pending)   US Lower Extremity Venous Duplex Left    (Results Pending)         ================================================================  EKG         I have independently reviewed and interpreted the EKG(s) documented above.     ================================================================  PROCEDURES     Critical Care  Performed by: Leslie Givens MD  Authorized by: Leslie Givens MD  Total critical care time: 60 minutes  Critical care time was exclusive of separately billable procedures and treating other patients.  Critical care was necessary to treat or prevent imminent or life-threatening deterioration of the following conditions: severe sepsis  Critical care was time spent personally by me on the following activities: development of treatment plan with patient or surrogate, discussions with consultants, examination of patient, evaluation of patient's response to treatment, obtaining history from patient or surrogate, ordering and performing treatments and interventions, ordering and review of laboratory studies, ordering and review of radiographic studies and re-evaluation of patient's condition, this excludes any separately billable procedures.        Leslie Givens M.D.   Emergency Medicine   [facility] EMERGENCY DEPARTMENT  [address] [place] [id number]  [phone]  Dept: [phone]        Leslie Givens MD  05/08/25 3565

## 2025-05-08 NOTE — ED NOTES
Per ED RT, patient will be trialed on CPAP but may not be able to provide the level of oxygen required by patient via CPAP. If this fails, BiPAP will be implemented.

## 2025-05-09 ENCOUNTER — APPOINTMENT (OUTPATIENT)
Dept: CT IMAGING | Facility: HOSPITAL | Age: 50
End: 2025-05-09
Attending: STUDENT IN AN ORGANIZED HEALTH CARE EDUCATION/TRAINING PROGRAM
Payer: COMMERCIAL

## 2025-05-09 ENCOUNTER — APPOINTMENT (OUTPATIENT)
Dept: PHYSICAL THERAPY | Facility: HOSPITAL | Age: 50
End: 2025-05-09
Attending: STUDENT IN AN ORGANIZED HEALTH CARE EDUCATION/TRAINING PROGRAM
Payer: COMMERCIAL

## 2025-05-09 ENCOUNTER — APPOINTMENT (OUTPATIENT)
Dept: CT IMAGING | Facility: HOSPITAL | Age: 50
End: 2025-05-09
Attending: HOSPITALIST
Payer: COMMERCIAL

## 2025-05-09 ENCOUNTER — APPOINTMENT (OUTPATIENT)
Dept: CARDIOLOGY | Facility: HOSPITAL | Age: 50
DRG: 871 | End: 2025-05-09
Attending: STUDENT IN AN ORGANIZED HEALTH CARE EDUCATION/TRAINING PROGRAM
Payer: COMMERCIAL

## 2025-05-09 VITALS
SYSTOLIC BLOOD PRESSURE: 89 MMHG | DIASTOLIC BLOOD PRESSURE: 52 MMHG | TEMPERATURE: 98.1 F | HEART RATE: 81 BPM | OXYGEN SATURATION: 98 % | RESPIRATION RATE: 18 BRPM

## 2025-05-09 LAB
ACB COMPLEX DNA BLD POS QL NAA+NON-PROBE: NOT DETECTED
ANION GAP SERPL CALCULATED.3IONS-SCNC: 6 MMOL/L (ref 7–15)
ATRIAL RATE - MUSE: 91 BPM
B FRAGILIS DNA BLD POS QL NAA+NON-PROBE: NOT DETECTED
BLACTX-M ISLT/SPM QL: NOT DETECTED
BLAIMP ISLT/SPM QL: NOT DETECTED
BLAKPC ISLT/SPM QL: NOT DETECTED
BLAVIM ISLT/SPM QL: NOT DETECTED
BUN SERPL-MCNC: 25.3 MG/DL (ref 6–20)
C ALBICANS DNA BLD POS QL NAA+NON-PROBE: NOT DETECTED
C AURIS DNA BLD POS QL NAA+NON-PROBE: NOT DETECTED
C GATTII+NEOFOR DNA BLD POS QL NAA+N-PRB: NOT DETECTED
C GLABRATA DNA BLD POS QL NAA+NON-PROBE: NOT DETECTED
C KRUSEI DNA BLD POS QL NAA+NON-PROBE: NOT DETECTED
C PARAP DNA BLD POS QL NAA+NON-PROBE: NOT DETECTED
C TROPICLS DNA BLD POS QL NAA+NON-PROBE: NOT DETECTED
CALCIUM SERPL-MCNC: 9 MG/DL (ref 8.8–10.4)
CHLORIDE SERPL-SCNC: 102 MMOL/L (ref 98–107)
CREAT SERPL-MCNC: 1.09 MG/DL (ref 0.51–0.95)
DIASTOLIC BLOOD PRESSURE - MUSE: NORMAL MMHG
E CLOAC COMP DNA BLD POS NAA+NON-PROBE: NOT DETECTED
E COLI DNA BLD POS QL NAA+NON-PROBE: NOT DETECTED
E FAECALIS DNA BLD POS QL NAA+NON-PROBE: NOT DETECTED
E FAECIUM DNA BLD POS QL NAA+NON-PROBE: NOT DETECTED
EGFRCR SERPLBLD CKD-EPI 2021: 62 ML/MIN/1.73M2
ENTEROBACTERALES DNA BLD POS NAA+N-PRB: NOT DETECTED
ERYTHROCYTE [DISTWIDTH] IN BLOOD BY AUTOMATED COUNT: 16.9 % (ref 10–15)
GLUCOSE BLDC GLUCOMTR-MCNC: 100 MG/DL (ref 70–99)
GLUCOSE BLDC GLUCOMTR-MCNC: 104 MG/DL (ref 70–99)
GLUCOSE BLDC GLUCOMTR-MCNC: 147 MG/DL (ref 70–99)
GLUCOSE BLDC GLUCOMTR-MCNC: 159 MG/DL (ref 70–99)
GLUCOSE BLDC GLUCOMTR-MCNC: 31 MG/DL (ref 70–99)
GLUCOSE BLDC GLUCOMTR-MCNC: 64 MG/DL (ref 70–99)
GLUCOSE BLDC GLUCOMTR-MCNC: 65 MG/DL (ref 70–99)
GLUCOSE BLDC GLUCOMTR-MCNC: 78 MG/DL (ref 70–99)
GLUCOSE BLDC GLUCOMTR-MCNC: 83 MG/DL (ref 70–99)
GLUCOSE SERPL-MCNC: 98 MG/DL (ref 70–99)
GP B STREP DNA BLD POS QL NAA+NON-PROBE: NOT DETECTED
HAEM INFLU DNA BLD POS QL NAA+NON-PROBE: NOT DETECTED
HCO3 SERPL-SCNC: 27 MMOL/L (ref 22–29)
HCT VFR BLD AUTO: 39.4 % (ref 35–47)
HGB BLD-MCNC: 12.8 G/DL (ref 11.7–15.7)
INTERPRETATION ECG - MUSE: NORMAL
K OXYTOCA DNA BLD POS QL NAA+NON-PROBE: NOT DETECTED
KLEBSIELLA SP DNA BLD POS QL NAA+NON-PRB: NOT DETECTED
KLEBSIELLA SP DNA BLD POS QL NAA+NON-PRB: NOT DETECTED
L MONOCYTOG DNA BLD POS QL NAA+NON-PROBE: NOT DETECTED
LACTATE SERPL-SCNC: 1.4 MMOL/L (ref 0.7–2)
LACTATE SERPL-SCNC: 1.8 MMOL/L (ref 0.7–2)
LACTATE SERPL-SCNC: 3.2 MMOL/L (ref 0.7–2)
MAGNESIUM SERPL-MCNC: 2.2 MG/DL (ref 1.7–2.3)
MCH RBC QN AUTO: 29.4 PG (ref 26.5–33)
MCHC RBC AUTO-ENTMCNC: 32.5 G/DL (ref 31.5–36.5)
MCV RBC AUTO: 91 FL (ref 78–100)
N MEN DNA BLD POS QL NAA+NON-PROBE: NOT DETECTED
NDM: NOT DETECTED
P AERUGINOSA DNA BLD POS NAA+NON-PROBE: DETECTED
P AXIS - MUSE: 66 DEGREES
PHOSPHATE SERPL-MCNC: 2.9 MG/DL (ref 2.5–4.5)
PLATELET # BLD AUTO: 187 10E3/UL (ref 150–450)
POTASSIUM SERPL-SCNC: 3.8 MMOL/L (ref 3.4–5.3)
PR INTERVAL - MUSE: 194 MS
PROTEUS SP DNA BLD POS QL NAA+NON-PROBE: NOT DETECTED
QRS DURATION - MUSE: 102 MS
QT - MUSE: 386 MS
QTC - MUSE: 474 MS
R AXIS - MUSE: 137 DEGREES
RBC # BLD AUTO: 4.35 10E6/UL (ref 3.8–5.2)
S AUREUS DNA BLD POS QL NAA+NON-PROBE: NOT DETECTED
S AUREUS+CONS DNA BLD POS NAA+NON-PROBE: NOT DETECTED
S EPIDERMIDIS DNA BLD POS QL NAA+NON-PRB: NOT DETECTED
S LUGDUNENSIS DNA BLD POS QL NAA+NON-PRB: NOT DETECTED
S MALTOPHILIA DNA BLD POS QL NAA+NON-PRB: NOT DETECTED
S MARCESCENS DNA BLD POS NAA+NON-PROBE: NOT DETECTED
S PNEUM DNA BLD POS QL NAA+NON-PROBE: NOT DETECTED
S PYO DNA BLD POS QL NAA+NON-PROBE: NOT DETECTED
SALMONELLA DNA BLD POS QL NAA+NON-PROBE: NOT DETECTED
SODIUM SERPL-SCNC: 135 MMOL/L (ref 135–145)
STREPTOCOCCUS DNA BLD POS NAA+NON-PROBE: NOT DETECTED
SYSTOLIC BLOOD PRESSURE - MUSE: NORMAL MMHG
T AXIS - MUSE: 207 DEGREES
VENTRICULAR RATE- MUSE: 91 BPM
WBC # BLD AUTO: 11.2 10E3/UL (ref 4–11)

## 2025-05-09 PROCEDURE — 120N000004 HC R&B MS OVERFLOW

## 2025-05-09 PROCEDURE — 83605 ASSAY OF LACTIC ACID: CPT | Performed by: INTERNAL MEDICINE

## 2025-05-09 PROCEDURE — 73701 CT LOWER EXTREMITY W/DYE: CPT | Mod: LT,XS

## 2025-05-09 PROCEDURE — 73701 CT LOWER EXTREMITY W/DYE: CPT | Mod: 50

## 2025-05-09 PROCEDURE — 74177 CT ABD & PELVIS W/CONTRAST: CPT

## 2025-05-09 PROCEDURE — 999N000157 HC STATISTIC RCP TIME EA 10 MIN

## 2025-05-09 PROCEDURE — 83605 ASSAY OF LACTIC ACID: CPT | Performed by: STUDENT IN AN ORGANIZED HEALTH CARE EDUCATION/TRAINING PROGRAM

## 2025-05-09 PROCEDURE — 250N000011 HC RX IP 250 OP 636: Performed by: STUDENT IN AN ORGANIZED HEALTH CARE EDUCATION/TRAINING PROGRAM

## 2025-05-09 PROCEDURE — 93308 TTE F-UP OR LMTD: CPT | Mod: 26 | Performed by: INTERNAL MEDICINE

## 2025-05-09 PROCEDURE — 85041 AUTOMATED RBC COUNT: CPT | Performed by: STUDENT IN AN ORGANIZED HEALTH CARE EDUCATION/TRAINING PROGRAM

## 2025-05-09 PROCEDURE — 99233 SBSQ HOSP IP/OBS HIGH 50: CPT | Performed by: HOSPITALIST

## 2025-05-09 PROCEDURE — 93325 DOPPLER ECHO COLOR FLOW MAPG: CPT | Mod: 26 | Performed by: INTERNAL MEDICINE

## 2025-05-09 PROCEDURE — 255N000002 HC RX 255 OP 636: Performed by: STUDENT IN AN ORGANIZED HEALTH CARE EDUCATION/TRAINING PROGRAM

## 2025-05-09 PROCEDURE — 94660 CPAP INITIATION&MGMT: CPT

## 2025-05-09 PROCEDURE — 93005 ELECTROCARDIOGRAM TRACING: CPT

## 2025-05-09 PROCEDURE — 250N000013 HC RX MED GY IP 250 OP 250 PS 637: Performed by: STUDENT IN AN ORGANIZED HEALTH CARE EDUCATION/TRAINING PROGRAM

## 2025-05-09 PROCEDURE — 83605 ASSAY OF LACTIC ACID: CPT | Performed by: HOSPITALIST

## 2025-05-09 PROCEDURE — 82310 ASSAY OF CALCIUM: CPT | Performed by: STUDENT IN AN ORGANIZED HEALTH CARE EDUCATION/TRAINING PROGRAM

## 2025-05-09 PROCEDURE — 84100 ASSAY OF PHOSPHORUS: CPT | Performed by: STUDENT IN AN ORGANIZED HEALTH CARE EDUCATION/TRAINING PROGRAM

## 2025-05-09 PROCEDURE — 250N000009 HC RX 250: Performed by: INTERNAL MEDICINE

## 2025-05-09 PROCEDURE — 250N000013 HC RX MED GY IP 250 OP 250 PS 637: Performed by: HOSPITALIST

## 2025-05-09 PROCEDURE — 83735 ASSAY OF MAGNESIUM: CPT | Performed by: STUDENT IN AN ORGANIZED HEALTH CARE EDUCATION/TRAINING PROGRAM

## 2025-05-09 PROCEDURE — 36415 COLL VENOUS BLD VENIPUNCTURE: CPT | Performed by: HOSPITALIST

## 2025-05-09 PROCEDURE — 36415 COLL VENOUS BLD VENIPUNCTURE: CPT | Performed by: INTERNAL MEDICINE

## 2025-05-09 PROCEDURE — 258N000003 HC RX IP 258 OP 636: Performed by: INTERNAL MEDICINE

## 2025-05-09 PROCEDURE — 258N000001 HC RX 258: Performed by: STUDENT IN AN ORGANIZED HEALTH CARE EDUCATION/TRAINING PROGRAM

## 2025-05-09 PROCEDURE — 250N000011 HC RX IP 250 OP 636: Performed by: INTERNAL MEDICINE

## 2025-05-09 PROCEDURE — 99222 1ST HOSP IP/OBS MODERATE 55: CPT | Performed by: INTERNAL MEDICINE

## 2025-05-09 PROCEDURE — 93321 DOPPLER ECHO F-UP/LMTD STD: CPT | Mod: 26 | Performed by: INTERNAL MEDICINE

## 2025-05-09 PROCEDURE — G0463 HOSPITAL OUTPT CLINIC VISIT: HCPCS

## 2025-05-09 PROCEDURE — 36415 COLL VENOUS BLD VENIPUNCTURE: CPT | Performed by: STUDENT IN AN ORGANIZED HEALTH CARE EDUCATION/TRAINING PROGRAM

## 2025-05-09 PROCEDURE — 93010 ELECTROCARDIOGRAM REPORT: CPT | Performed by: INTERNAL MEDICINE

## 2025-05-09 PROCEDURE — 97162 PT EVAL MOD COMPLEX 30 MIN: CPT | Mod: GP | Performed by: PHYSICAL THERAPIST

## 2025-05-09 RX ORDER — BISACODYL 10 MG
10 SUPPOSITORY, RECTAL RECTAL ONCE
Status: COMPLETED | OUTPATIENT
Start: 2025-05-09 | End: 2025-05-09

## 2025-05-09 RX ORDER — IOPAMIDOL 755 MG/ML
100 INJECTION, SOLUTION INTRAVASCULAR ONCE
Status: COMPLETED | OUTPATIENT
Start: 2025-05-09 | End: 2025-05-09

## 2025-05-09 RX ORDER — BISACODYL 10 MG
10 SUPPOSITORY, RECTAL RECTAL DAILY PRN
Status: DISCONTINUED | OUTPATIENT
Start: 2025-05-09 | End: 2025-05-19 | Stop reason: HOSPADM

## 2025-05-09 RX ORDER — DOBUTAMINE HYDROCHLORIDE 200 MG/100ML
1.25 INJECTION INTRAVENOUS CONTINUOUS
Status: DISCONTINUED | OUTPATIENT
Start: 2025-05-09 | End: 2025-05-16

## 2025-05-09 RX ADMIN — PIPERACILLIN AND TAZOBACTAM 4.5 G: 4; .5 INJECTION, POWDER, FOR SOLUTION INTRAVENOUS at 02:59

## 2025-05-09 RX ADMIN — APIXABAN 5 MG: 5 TABLET, FILM COATED ORAL at 09:17

## 2025-05-09 RX ADMIN — DEXTROSE MONOHYDRATE 50 ML: 25 INJECTION, SOLUTION INTRAVENOUS at 08:44

## 2025-05-09 RX ADMIN — BISACODYL 10 MG: 10 SUPPOSITORY RECTAL at 10:52

## 2025-05-09 RX ADMIN — PERFLUTREN 3 ML: 6.52 INJECTION, SUSPENSION INTRAVENOUS at 12:18

## 2025-05-09 RX ADMIN — SODIUM CHLORIDE 250 ML: 0.9 INJECTION, SOLUTION INTRAVENOUS at 04:20

## 2025-05-09 RX ADMIN — ACETAMINOPHEN 650 MG: 325 TABLET ORAL at 20:25

## 2025-05-09 RX ADMIN — APIXABAN 5 MG: 5 TABLET, FILM COATED ORAL at 20:25

## 2025-05-09 RX ADMIN — ASPIRIN 81 MG: 81 TABLET, COATED ORAL at 09:17

## 2025-05-09 RX ADMIN — IOPAMIDOL 100 ML: 755 INJECTION, SOLUTION INTRAVENOUS at 17:56

## 2025-05-09 RX ADMIN — PIPERACILLIN AND TAZOBACTAM 4.5 G: 4; .5 INJECTION, POWDER, FOR SOLUTION INTRAVENOUS at 09:17

## 2025-05-09 RX ADMIN — ESCITALOPRAM OXALATE 10 MG: 10 TABLET ORAL at 09:17

## 2025-05-09 RX ADMIN — FUROSEMIDE 10 MG/HR: 10 INJECTION, SOLUTION INTRAVENOUS at 10:01

## 2025-05-09 RX ADMIN — PIPERACILLIN AND TAZOBACTAM 4.5 G: 4; .5 INJECTION, POWDER, FOR SOLUTION INTRAVENOUS at 16:18

## 2025-05-09 RX ADMIN — SODIUM CHLORIDE 250 ML: 0.9 INJECTION, SOLUTION INTRAVENOUS at 01:18

## 2025-05-09 RX ADMIN — DOBUTAMINE IN DEXTROSE 2.5 MCG/KG/MIN: 200 INJECTION, SOLUTION INTRAVENOUS at 13:35

## 2025-05-09 RX ADMIN — SIMVASTATIN 10 MG: 10 TABLET, FILM COATED ORAL at 20:25

## 2025-05-09 RX ADMIN — PIPERACILLIN AND TAZOBACTAM 4.5 G: 4; .5 INJECTION, POWDER, FOR SOLUTION INTRAVENOUS at 20:26

## 2025-05-09 ASSESSMENT — ACTIVITIES OF DAILY LIVING (ADL)
ADLS_ACUITY_SCORE: 55
ADLS_ACUITY_SCORE: 58
ADLS_ACUITY_SCORE: 55
ADLS_ACUITY_SCORE: 58
ADLS_ACUITY_SCORE: 55
ADLS_ACUITY_SCORE: 58
ADLS_ACUITY_SCORE: 55
ADLS_ACUITY_SCORE: 58
ADLS_ACUITY_SCORE: 55
ADLS_ACUITY_SCORE: 55
ADLS_ACUITY_SCORE: 56
ADLS_ACUITY_SCORE: 55
ADLS_ACUITY_SCORE: 56
ADLS_ACUITY_SCORE: 55

## 2025-05-09 NOTE — PLAN OF CARE
"  Problem: Adult Inpatient Plan of Care  Goal: Plan of Care Review  Description: The Plan of Care Review/Shift note should be completed every shift.  The Outcome Evaluation is a brief statement about your assessment that the patient is improving, declining, or no change.  This information will be displayed automatically on your shiftnote.  Outcome: Progressing  Goal: Patient-Specific Goal (Individualized)  Description: You can add care plan individualizations to a care plan. Examples of Individualization might be:  \"Parent requests to be called daily at 9am for status\", \"I have a hard time hearing out of my right ear\", or \"Do not touch me to wake me up as it startlesme\".  Outcome: Progressing  Goal: Absence of Hospital-Acquired Illness or Injury  Outcome: Progressing  Intervention: Prevent and Manage VTE (Venous Thromboembolism) Risk  Recent Flowsheet Documentation  Taken 5/8/2025 2004 by Klarissa Llanes, RN  VTE Prevention/Management: compression stockings on  Goal: Optimal Comfort and Wellbeing  Outcome: Progressing  Goal: Readiness for Transition of Care  Outcome: Progressing  Intervention: Mutually Develop Transition Plan  Recent Flowsheet Documentation  Taken 5/8/2025 2100 by Klarissa Llanes, RN  Equipment Currently Used at Home:   commode chair   grab bar, toilet   grab bar, tub/shower   shower chair   walker, rolling   wheelchair, manual   other (see comments)   Goal Outcome Evaluation:       Pt alert and oriented, sleep between cares but easily awake to voice call, BiPAP on, sats in the high 90s, lymphoedema to bilateral lower extremities, left leg painful, positing done and elevated on pillows. Sputum collection pending, Pt unable to produce at this time, CT of abd and femur thigh pending due BIPAP.                        "

## 2025-05-09 NOTE — PLAN OF CARE
Lymphedema Discharge Summary    Reason for therapy discharge:    Patient seen for initial lymphedema evaluation only. Pt presents with extensive lymphedema needs. Patient declining lymphedema treatment while in hospital. Recommend continued home or outpatient lymphedema follow-up.    Progress towards therapy goal(s). See goals on Care Plan in [facility] electronic health record for goal details.  Eval only.    Therapy recommendation(s):    Continued therapy is recommended.  Rationale/Recommendations:  recommend home or outpatient lymphedema therapy.    Thelma Peñaloza, PT  5/9/2025

## 2025-05-09 NOTE — PROGRESS NOTES
"[facility]    Medicine Progress Note - Hospitalist Service    Date of Admission:  5/8/2025    Assessment & Plan   49-year-old female with pulmonary HTN, chronic respiratory failure and recent DVT presented with shortness of breath and weakness and found to be septic and hypoxic.    #Severe sepsis with endorgan dysfunction involving lactic acidosis, worsened respiratory failure  #Gram-negative bacteremia  #Cellulitis, LLE  Blood culture positive for Pseudomonas  MRSA screen negative  Stop vancomycin and azithromycin, continue Zosyn and follow culture sensitivities  S/p IV fluid hydration 2.5L  Obtain CT LLE to rule out abscess    #Acute on chronic HFpEF  #Pulmonary hypertension  #Acute on chronic respiratory failure with hypoxia and hypercapnia  S/p 10 L oximask, BiPAP, now weaned to nasal cannula  Cardiology consulted and starting dobutamine and Lasix drips  Echo  Holding PTA spironolactone Bumex  Transfer to P3    #Recent DVT, LLE  #Chronic lymphedema  PTA Eliquis  Consult lymph team    #Prediabetes  #Hypoglycemia  Stop SSI  Continue Accu-Cheks for now    #Morbid obesity  BMI 50      VTE PPx: DOAC        Diet: Low Saturated Fat Na <2400 mg    Brar Catheter: Not present  Lines: None     Cardiac Monitoring: ACTIVE order. Indication: Tachyarrhythmias, acute (48 hours)  Code Status: Full Code      Clinically Significant Risk Factors Present on Admission                # Drug Induced Coagulation Defect: home medication list includes an anticoagulant medication  # Drug Induced Platelet Defect: home medication list includes an antiplatelet medication   # Hypertension: Noted on problem list  # Acute heart failure with preserved ejection fraction: heart failure noted on problem list, last echo with EF >50%, and receiving IV diuretics          # Morbid Obesity: Estimated body mass index is 50.78 kg/m  as calculated from the following:    Height as of this encounter: 1.626 m (5' 4\").    Weight as of " this encounter: 134.2 kg (295 lb 13.7 oz).       # Financial/Environmental Concerns:           Social Drivers of Health    Food Insecurity: Low Risk  (5/8/2025)    Food Insecurity     Within the past 12 months, did you worry that your food would run out before you got money to buy more?: No     Within the past 12 months, did the food you bought just not last and you didn t have money to get more?: No   Recent Concern: Food Insecurity - High Risk (3/23/2025)    Food Insecurity     Within the past 12 months, did you worry that your food would run out before you got money to buy more?: Yes     Within the past 12 months, did the food you bought just not last and you didn t have money to get more?: No   Housing Stability: High Risk (5/8/2025)    Housing Stability     Do you have housing? : No     Are you worried about losing your housing?: No   Transportation Needs: High Risk (5/8/2025)    Transportation Needs     Within the past 12 months, has lack of transportation kept you from medical appointments, getting your medicines, non-medical meetings or appointments, work, or from getting things that you need?: Yes   Physical Activity: Inactive (3/21/2024)    Exercise Vital Sign     Days of Exercise per Week: 0 days     Minutes of Exercise per Session: 0 min   Interpersonal Safety: Low Risk  (5/8/2025)    Interpersonal Safety     Do you feel physically and emotionally safe where you currently live?: Yes     Within the past 12 months, have you been hit, slapped, kicked or otherwise physically hurt by someone?: No     Within the past 12 months, have you been humiliated or emotionally abused in other ways by your partner or ex-partner?: No   Recent Concern: Interpersonal Safety - High Risk (3/23/2025)    Interpersonal Safety     Do you feel physically and emotionally safe where you currently live?: No     Within the past 12 months, have you been hit, slapped, kicked or otherwise physically hurt by someone?: No     Within the  past 12 months, have you been humiliated or emotionally abused in other ways by your partner or ex-partner?: No   Social Connections: Unknown (3/1/2022)    Social Connection and Isolation Panel [NHANES]     Frequency of Communication with Friends and Family: Twice a week     Frequency of Social Gatherings with Friends and Family: Twice a week          Disposition Plan     Medically Ready for Discharge: Anticipated in 2-4 Days             Michael Cabrera DO  Hospitalist Service  [facility]  Securely message with [employer] (more info)  Text page via [email or web address] Paging/Directory   ______________________________________________________________________    Interval History   Feeling a little better today    Physical Exam   Vital Signs: Temp: 98  F (36.7  C) Temp src: Axillary BP: 101/54 Pulse: 84   Resp: 19 SpO2: 93 % O2 Device: Nasal cannula Oxygen Delivery: 3 LPM  Weight: 295 lbs 13.72 oz    General Appearance:  No acute distress  Respiratory: Clear to auscultation bilaterally  Cardiovascular: Regular rate and rhythm  Bulbous skin L lower leg with demarcated area of redness, no open wounds or drainage    Medical Decision Making       50 MINUTES SPENT BY ME on the date of service doing chart review, history, exam, documentation & further activities per the note.      Data

## 2025-05-09 NOTE — PLAN OF CARE
Problem: Adult Inpatient Plan of Care  Goal: Optimal Comfort and Wellbeing  Outcome: Progressing     Problem: Comorbidity Management  Goal: Maintenance of COPD Symptom Control  Outcome: Progressing     Problem: Comorbidity Management  Goal: Blood Glucose Levels Within Targeted Range  Outcome: Progressing     Problem: Pain Acute  Goal: Optimal Pain Control and Function  Outcome: Progressing     Problem: Infection  Goal: Absence of Infection Signs and Symptoms  Outcome: Progressing     Problem: Gas Exchange Impaired  Goal: Optimal Gas Exchange  Outcome: Progressing       Pt A/Ox4. Denied pain. Transferred from  at 1330 via bed. Pts BG noted to be 64 at transfer, OJ given x2. Intervention effective with BG recheck of 100. Dobutamine gtt started at 2.5mcg/kg/min per orders. Pt tolerating well w/o difficulty. VSS. Lasix gtt remains at 10mg/hr. Pt reported to writer that she had a recent shingles infection in April and noted to have scabbed/potentially open blisters on left hip. Dr. Cabrera notified and contact isolation added as a precaution. CT completed this shift. Rash noted under pts breast, sticky note left for physician to address in the AM. Plan of care continues.

## 2025-05-09 NOTE — PROGRESS NOTES
Called about hypotension. Check lactic acid. Saline 250cc over 1 hourx1    Received call from Sonali Pedro NP who works with Dr. Mcleod at [facility]. She state pt got her fusion on 2/20/2020 and is still having post op pain and is out of medication. She state she is calling to collaborate with Latisha on how to rx pt's pain medication due to pt having Norco 5/325 and 7.5/325 as baseline and is thinking of sending in another rx. Let her know Latisha is not in office until tomorrow. Sonali state since Latisha is not here and pt is out of medication, she is going to send in Norco 10/325 mg and she will call to cancel Norco 5/235 mg and to update Latisha when she comes back in office. She state she will defer pt to Latisha after this rx. No further questions at this time.     Route to Latisha for update.

## 2025-05-09 NOTE — PROGRESS NOTES
Transported patient to , room 404, via V60 BiPAP and monitored cart without issues. Report given to  RT. This writer signing off.     BP 89/54 (BP Location: Left arm)   Pulse 89   Temp 98.2  F (36.8  C) (Axillary)   Resp 26   LMP 04/08/2025 (Within Days)   SpO2 94%      Teo Zaman, RRT

## 2025-05-09 NOTE — PROGRESS NOTES
Heart Failure Care Map  GOALS TO BE MET BEFORE DISCHARGE:    1. Decrease congestion and/or edema with diuretic therapy to achieve near optimal volume status.     Dyspnea improved: No, further care required to meet this goal. Please explain SOB/ROONEY   Edema improved: No, further care required to meet this goal. Please explain BLE edema        Last 24 hour I/O:   Intake/Output Summary (Last 24 hours) at 5/9/2025 1848  Last data filed at 5/9/2025 1700  Gross per 24 hour   Intake 720 ml   Output 1250 ml   Net -530 ml           Net I/O and Weights since admission:   04/09 2300 - 05/09 2259  In: 720 [P.O.:720]  Out: 1250 [Urine:1250]  Net: -530     Vitals:    05/09/25 0825   Weight: 134.2 kg (295 lb 13.7 oz)       2.  O2 sats > 90% on room air, or at prior home O2 therapy level.      Able to wean O2 this shift to keep sats above 90%?: Yes, satisfactory for discharge.   Does patient use Home O2? Yes-  Baseline on 3L at home.          Current oxygenation status:   SpO2: 100 %     O2 Device: Nasal cannula, Oxygen Delivery: 3 LPM    3.  Tolerates ambulation and mobility near baseline.     Ambulation: No, further care required to meet this goal. Please explain Bed bound, unable to bear weight on legs d/t pain   Times patient ambulated with staff this shift: 0    Please review the Heart Failure Care Map for additional HF goal outcomes.        Sunita Hester RN  5/9/2025

## 2025-05-09 NOTE — CONSULTS
"  Thank you,  No ref. provider found, for asking the [facility] Heart Care team to see Ms. 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  Agree with dobutamine and infusion furosemide.  When recovers from infection would advocate for VQ scan, followed by left/right heart cath with possible vasodilator study, if true, may benefit from surgery or PTA of pulmonary arteries.   Avoid after load reducing agents given severe to critical pulm HTN         History of Present Illness/Subjective    Ms. 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  Echo nl LV, RV reduced with PA systocl  plus RAP.  Noted hypotensive on arrival, started on dobutamine and iv furosemide infusion.   She uses CPAP for NARCISA, fever/chills, but no chest pain, cont dyspnea, severe edema both legs for past 3-5 years with fluid filled bullae on her lower legs.  No syncopal events.           Physical Examination Review of Systems   /49 (BP Location: Right arm)   Pulse 92   Temp 98.2  F (36.8  C) (Oral)   Resp 20   Ht 1.626 m (5' 4\")   Wt 134.2 kg (295 lb 13.7 oz)   LMP 04/08/2025 (Within Days)   SpO2 (!) 91%   BMI 50.78 kg/m    Body mass index is 50.78 kg/m .  Wt Readings from Last 3 Encounters:   05/09/25 134.2 kg (295 lb 13.7 oz)   04/15/25 116.9 kg (257 lb 12.8 oz)   04/03/25 123.4 kg (272 lb 0.8 oz)       Intake/Output Summary (Last 24 hours) at 5/9/2025 1336  Last data filed at 5/9/2025 1317  Gross per 24 hour   Intake 720 ml   Output 650 ml   Net 70 ml     General Appearance:   no distress, normal body habitus "   ENT/Mouth: membranes moist, no oral lesions or bleeding gums.      EYES:  no scleral icterus, normal conjunctivae   Neck: no carotid bruits or thyromegaly   Chest/Lungs:   lungs are clear to auscultation, no rales or wheezing,  sternal scar, equal chest wall expansion    Cardiovascular:   Regular. Normal first and second heart sounds with no murmurs, rubs, or gallops; the carotid, radial and posterior tibial pulses are intact, Jugular venous pressure , edema bilaterally    Abdomen:  no organomegaly, masses, bruits, or tenderness; bowel sounds are present   Extremities: no cyanosis or clubbing   Skin: no xanthelasma, warm.    Neurologic: normal  bilateral, no tremors     Psychiatric: alert and oriented x3, calm     Review of Systems - 12 points nega other than above      Medical History  Surgical History Family History Social History   Past Medical History:   Diagnosis Date    Acute on chronic diastolic congestive heart failure (H) 02/09/2022    Arthritis 2019    Hips    ASCUS favor benign 08/2015    Neg HPV    Deep vein thrombosis (DVT) of left lower extremity (H) 03/25/2025    Depressive disorder 2010    H/O colposcopy with cervical biopsy 09/14/2015    Bx & ECC - negative    Hypertension 2019    LSIL on Pap smear 07/2014    Neg high risk HPV    Multiple sclerosis (H) 08/29/2005 9/18/200pt dx with MS 2004--dx by neurolgist and is followed by Dr. Harris    Myocardial infarction (H)     Obese     Pneumonia due to infectious organism, unspecified laterality, unspecified part of lung 02/08/2022    Sepsis (Temp 101, , WBC 13.0) 10/23/2018    Shingles 10/2016    SIRS (systemic inflammatory response syndrome) (H) 03/04/2021    Sleep apnea     UNSPEC CONSTIPATION 09/18/2006 9/18/2006   Has tried otc suppository and otc lax.     Past Surgical History:   Procedure Laterality Date    CHOLECYSTECTOMY, LAPOROSCOPIC      Cholecystectomy, Laparoscopic    COLONOSCOPY  2007?    Bathroom issue..results normal     COMBINED CYSTOSCOPY, RETROGRADES, EXCHANGE STENT URETER(S) Right 2/21/2022    Procedure: CYSTOSCOPY, WITH right RETROGRADE PYELOGRAM AND right URETERAL STENT PLACEMENT;  Surgeon: Doyle Palomares MD;  Location: [facility] OR    OPEN REDUCTION INTERNAL FIXATION TOE(S)  4/13/2012    Procedure:OPEN REDUCTION INTERNAL FIXATION TOE(S); Open reduction internal fixation right proximal fifth metatarsal fracture-   Anes-choice block; Surgeon:LEY, JEFFREY DUANE; Location:[place] OR    PICC SINGLE LUMEN PLACEMENT  3/20/2024    PICC TRIPLE LUMEN PLACEMENT  2/21/2022         Family History   Problem Relation Age of Onset    Neurologic Disorder Father         MS    Heart Disease Father         irregular heart rate    Diabetes Father     Melanoma Father     Sleep Apnea Father     Other Cancer Father     Cancer Maternal Grandfather         lung    Other Cancer Maternal Grandfather     Cancer Paternal Grandmother         stomach    Other Cancer Paternal Grandmother     Cancer Mother     Other Cancer Mother     Thyroid Disease Mother     Gynecology Maternal Aunt         PCOS    Hypertension Maternal Grandmother     Anxiety Disorder Maternal Grandmother     Thyroid Disease Maternal Grandmother     Anxiety Disorder Sister     Social History     Socioeconomic History    Marital status: Single     Spouse name: Not on file    Number of children: Not on file    Years of education: Not on file    Highest education level: Not on file   Occupational History     Employer: [employer]   Tobacco Use    Smoking status: Never    Smokeless tobacco: Never   Vaping Use    Vaping status: Never Used   Substance and Sexual Activity    Alcohol use: Yes     Comment: social    Drug use: No    Sexual activity: Not Currently     Partners: Male     Birth control/protection: Pill   Other Topics Concern    Parent/sibling w/ CABG, MI or angioplasty before 65F 55M? No   Social History Narrative    , 3rd-5th grade     Social Drivers of  Health     Financial Resource Strain: Low Risk  (5/8/2025)    Financial Resource Strain     Within the past 12 months, have you or your family members you live with been unable to get utilities (heat, electricity) when it was really needed?: No   Food Insecurity: Low Risk  (5/8/2025)    Food Insecurity     Within the past 12 months, did you worry that your food would run out before you got money to buy more?: No     Within the past 12 months, did the food you bought just not last and you didn t have money to get more?: No   Recent Concern: Food Insecurity - High Risk (3/23/2025)    Food Insecurity     Within the past 12 months, did you worry that your food would run out before you got money to buy more?: Yes     Within the past 12 months, did the food you bought just not last and you didn t have money to get more?: No   Transportation Needs: High Risk (5/8/2025)    Transportation Needs     Within the past 12 months, has lack of transportation kept you from medical appointments, getting your medicines, non-medical meetings or appointments, work, or from getting things that you need?: Yes   Physical Activity: Inactive (3/21/2024)    Exercise Vital Sign     Days of Exercise per Week: 0 days     Minutes of Exercise per Session: 0 min   Stress: No Stress Concern Present (12/4/2020)    [employer] Occupational Health - Occupational Stress Questionnaire     Feeling of Stress : Only a little   Social Connections: Unknown (3/1/2022)    Social Connection and Isolation Panel [NHANES]     Frequency of Communication with Friends and Family: Twice a week     Frequency of Social Gatherings with Friends and Family: Twice a week     Attends Buddhism Services: Not on file     Active Member of Clubs or Organizations: Not on file     Attends Club or Organization Meetings: Not on file     Marital Status: Not on file   Interpersonal Safety: Low Risk  (5/8/2025)    Interpersonal Safety     Do you feel physically and emotionally  safe where you currently live?: Yes     Within the past 12 months, have you been hit, slapped, kicked or otherwise physically hurt by someone?: No     Within the past 12 months, have you been humiliated or emotionally abused in other ways by your partner or ex-partner?: No   Recent Concern: Interpersonal Safety - High Risk (3/23/2025)    Interpersonal Safety     Do you feel physically and emotionally safe where you currently live?: No     Within the past 12 months, have you been hit, slapped, kicked or otherwise physically hurt by someone?: No     Within the past 12 months, have you been humiliated or emotionally abused in other ways by your partner or ex-partner?: No   Housing Stability: High Risk (5/8/2025)    Housing Stability     Do you have housing? : No     Are you worried about losing your housing?: No          Medications  Allergies   Scheduled Meds:  Current Facility-Administered Medications   Medication Dose Route Frequency Provider Last Rate Last Admin    apixaban ANTICOAGULANT (ELIQUIS) tablet 5 mg  5 mg Oral BID Cody Marcelo MD   5 mg at 05/09/25 0917    aspirin EC tablet 81 mg  81 mg Oral QAM Cody Marcelo MD   81 mg at 05/09/25 0917    [Held by provider] empagliflozin (JARDIANCE) tablet 10 mg  10 mg Oral Cody Nicolas MD        escitalopram (LEXAPRO) tablet 10 mg  10 mg Oral Cody Nicolas MD   10 mg at 05/09/25 0917    piperacillin-tazobactam (ZOSYN) 4.5 g vial to attach to  mL bag  4.5 g Intravenous Q6H Cody Marcelo MD   4.5 g at 05/09/25 0917    simvastatin (ZOCOR) tablet 10 mg  10 mg Oral At Bedtime Cody Marcelo MD   10 mg at 05/08/25 2251    sodium chloride (PF) 0.9% PF flush 3 mL  3 mL Intracatheter Q8H Leslie Givens MD   3 mL at 05/09/25 0300    sodium chloride (PF) 0.9% PF flush 3 mL  3 mL Intracatheter Q8H Cody Marcelo MD   3 mL at 05/08/25 2338    [Held by provider] spironolactone (ALDACTONE) half-tab 12.5 mg  12.5 mg Oral QATALHA Marcelo,  MD Cody         Continuous Infusions:  Current Facility-Administered Medications   Medication Dose Route Frequency Provider Last Rate Last Admin    DOBUTamine (DOBUTREX) 500 mg in D5W 250 mL infusion (adult std conc)  2.5 mcg/kg/min Intravenous Continuous Hallie Cat MD        furosemide (LASIX) 500 mg/50mL infusion ADULT MAX CONC  10 mg/hr Intravenous Continuous Hallie Cat MD 1 mL/hr at 05/09/25 1312 10 mg/hr at 05/09/25 1312     PRN Meds:.  Current Facility-Administered Medications   Medication Dose Route Frequency Provider Last Rate Last Admin    acetaminophen (TYLENOL) tablet 650 mg  650 mg Oral Q4H PRN Cody Marcelo MD        Or    acetaminophen (TYLENOL) Suppository 650 mg  650 mg Rectal Q4H PRN Cody Marcelo MD        bisacodyl (DULCOLAX) suppository 10 mg  10 mg Rectal Daily PRN Michael Cabrera DO        calcium carbonate (TUMS) chewable tablet 1,000 mg  1,000 mg Oral 4x Daily PRN Cody Marcelo MD        glucose gel 15-30 g  15-30 g Oral Q15 Min PRN Cody Mareclo MD        Or    dextrose 50 % injection 25-50 mL  25-50 mL Intravenous Q15 Min PRN Cody Marcelo MD   50 mL at 05/09/25 0844    Or    glucagon injection 1 mg  1 mg Subcutaneous Q15 Min PRN Cody Marcelo MD        ipratropium - albuterol 0.5 mg/2.5 mg/3 mL (DUONEB) neb solution 3 mL  3 mL Nebulization Q4H PRN Cody Marcelo MD        ketorolac (TORADOL) injection 15 mg  15 mg Intravenous Q6H PRN Cody Marcelo MD        Or    ketorolac (TORADOL) injection 30 mg  30 mg Intravenous Q6H PRN Cody Marcelo MD   30 mg at 05/08/25 1526    lidocaine (LMX4) cream   Topical Q1H PRN Cody Marcelo MD        lidocaine 1 % 0.1-1 mL  0.1-1 mL Other Q1H PRN Cody Marcelo MD        melatonin tablet 5 mg  5 mg Oral At Bedtime PRN Cody Marcelo MD        miconazole (MICATIN) 2 % powder   Topical BID PRN Cody Marcelo MD        ondansetron (ZOFRAN ODT) ODT tab 4 mg  4 mg Oral Q6H PRN Cody Marcelo MD         Or    ondansetron (ZOFRAN) injection 4 mg  4 mg Intravenous Q6H PRN Cody Marcelo MD        polyethylene glycol (MIRALAX) Packet 17 g  17 g Oral BID PRN Cody Marcelo MD        senna-docusate (SENOKOT-S/PERICOLACE) 8.6-50 MG per tablet 1 tablet  1 tablet Oral BID PRN Cody Marcelo MD        Or    senna-docusate (SENOKOT-S/PERICOLACE) 8.6-50 MG per tablet 2 tablet  2 tablet Oral BID PRN Cody Marcelo MD        sodium chloride (PF) 0.9% PF flush 3 mL  3 mL Intracatheter q1 min prn Leslie Givens MD        sodium chloride (PF) 0.9% PF flush 3 mL  3 mL Intracatheter q1 min prn Cody Marcelo MD        No Known Allergies      Lab Results    Chemistry/lipid CBC Cardiac Enzymes/BNP/TSH/INR   Lab Results   Component Value Date    CHOL 81 02/12/2025    HDL 11 (L) 02/12/2025    TRIG 134 02/12/2025    BUN 25.3 (H) 05/09/2025     05/09/2025    CO2 27 05/09/2025    Lab Results   Component Value Date    WBC 11.2 (H) 05/09/2025    HGB 12.8 05/09/2025    HCT 39.4 05/09/2025    MCV 91 05/09/2025     05/09/2025    Lab Results   Component Value Date    TROPONINI 0.03 04/06/2022     (H) 04/06/2022    TSH 3.52 02/12/2025    INR 1.30 (H) 03/23/2025              Bob Herman MD  Interventional Cardiology  [facility]

## 2025-05-09 NOTE — PLAN OF CARE
Problem: Gas Exchange Impaired  Goal: Optimal Gas Exchange  Outcome: Progressing   Goal Outcome Evaluation:       Patient alert and oriented this shift. Patient changed over form bi-pap to baseline 3L per nasal cannula and tolerated well maintaining saturation with in normal limits. Lung sounds diminished patient reports feeling short of breath with any activity. Patient started on lasix drip has pure wick in place. On tele in NSR. Lower extremities continue to have severe edema left worse than the right. Left lower extremity also has increased redness and and warmth, and tender to the touch. Lymphedema consult in place patient refused to be seen due to tenderness of lower extremities.  Open areas noted on coccyx and under right pannus, mepliex applied WOC consult placed On IV antibiotics. Lasix drip started, patient transferred to P3 room 302 for cardiac drip.

## 2025-05-09 NOTE — PROGRESS NOTES
Respiratory Care Note    Pt transferred to 302, BiPAP placed in room and on standby.     Lucille Schroeder, RT

## 2025-05-09 NOTE — PLAN OF CARE
Problem: Adult Inpatient Plan of Care  Goal: Plan of Care Review  Description: The Plan of Care Review/Shift note should be completed every shift.  The Outcome Evaluation is a brief statement about your assessment that the patient is improving, declining, or no change.  This information will be displayed automatically on your shiftnote.  Outcome: Progressing  Goal: Absence of Hospital-Acquired Illness or Injury  Intervention: Identify and Manage Fall Risk  Recent Flowsheet Documentation  Taken 5/9/2025 0005 by Saroj Caraballo RN  Safety Promotion/Fall Prevention:   clutter free environment maintained   increased rounding and observation   increase visualization of patient   lighting adjusted   safety round/check completed  Intervention: Prevent Skin Injury  Recent Flowsheet Documentation  Taken 5/9/2025 0005 by Saroj Caraballo RN  Body Position: supine, head elevated     Problem: Comorbidity Management  Goal: Blood Glucose Levels Within Targeted Range  Outcome: Progressing  Intervention: Monitor and Manage Glycemia  Recent Flowsheet Documentation  Taken 5/9/2025 0005 by Saroj Caraballo RN  Medication Review/Management: medications reviewed     Problem: Comorbidity Management  Goal: Blood Pressure in Desired Range  Outcome: Progressing  Intervention: Maintain Blood Pressure Management  Recent Flowsheet Documentation  Taken 5/9/2025 0005 by Saroj Caraballo RN  Medication Review/Management: medications reviewed     Problem: Pain Acute  Goal: Optimal Pain Control and Function  Outcome: Progressing  Intervention: Prevent or Manage Pain  Recent Flowsheet Documentation  Taken 5/9/2025 0005 by Saroj Caraballo RN  Medication Review/Management: medications reviewed     Problem: Infection  Goal: Absence of Infection Signs and Symptoms  Outcome: Progressing     Problem: Sepsis/Septic Shock  Goal: Absence of Infection Signs and Symptoms  Outcome: Progressing  Intervention: Promote  Recovery  Recent Flowsheet Documentation  Taken 5/9/2025 0005 by Saroj Caraballo RN  Activity Management: activity adjusted per tolerance     Problem: Gas Exchange Impaired  Goal: Optimal Gas Exchange  Outcome: Progressing  Intervention: Optimize Oxygenation and Ventilation  Recent Flowsheet Documentation  Taken 5/9/2025 0005 by Saroj Caraballo RN  Head of Bed (HOB) Positioning: HOB at 20-30 degrees   Goal Outcome Evaluation:       Pt alert & oriented. On BiPaP with FiO2 40%. Hypotension, asymptomatic. MD notified with order of 250ml bolus in 1 hr. BP still low. MD notified again with another order of 250ml bolus in 2 hrs. BP improved 97/53. MD updated. IV abx given as scheduled. Denies pain. Pulse ox continuous, O2 sats above 92%. Afebrile. On Telemetry monitor showing NSR. Purewick in place, voiding. Low blood sugar, improving. Will continue to monitor.

## 2025-05-09 NOTE — CONSULTS
[facility] Nurse Inpatient Assessment     Consulted for: Coccyx: Pannus (R side)    Summary: Patient with respiratory distress and recently transferred from P4 to P3 - did not want to turn for assessment of coccyx today. Patient has been seen by [facility] RN previously for concerns on the coccyx area as this is a chronic wound that frequently opens and closes per patient's report. Will continue with Mepilex to the area at this time. [facility] will plan in person assessment early next week for that site. Pannus wound assessment below.    [facility] nurse follow-up plan: weekly    Patient History (according to provider note(s):    Assessment & Plan  Bess Enamorado is a 49 year old female with a past medical history of Chronic Hypoxic Respiratory Failure (on 3L NC at home), Pulmonary HTN, Chronic CHF who was admitted on 5/8/25 after presenting to [facility] ED for Sepsis with Hypoxia.        #Severe Sepsis with Multiple Possible Sources  #Bilateral Leg Swelling  - In the ED - WBC 6.2, Lactate 5.1, BP 90s/50s. HR 120s.  - CXR concerning for pulmonary edema - no obvious PNA  - UA with possible UTI. Legs concerning for cellulitis  - US Left LE in the ED - negative for DVT.  Plan  - Given 1L in the ED - adding additional 1L on admission  - Pneumonia microbiology labs ordered  - Blood culture, urine culture pending.  - Vancomycin, Zosyn, Azithromycin   - Trending lactate q6h   - Checking CT A/P for possible abdominal source of infection  - Checking CT of bilateral LE to evaluate for necrotizing infection.     Addendum: BP improved to 120/60 around 6p after 2L fluid bolus given. BP seems to be highly variable depending on cuff size. Follow up lactate from 6p is 1.6 from 2.3. Hold off on additional fluid for now.    Assessment:    Skin Injury Location: Pannus - R side    Skin injury due to: Moisture associated skin damage (MASD)  Skin history and plan of care: Patient states she frequently acquires this type of injury. Has  Interdry at home for prevention and treatment use.  Affected area:      Skin assessment: Denudement     Measurements (length x width x depth, in cm) Two linear denudements each measuring approximately 0.5 cm x 2 cm     Color: normal and consistent with surrounding tissue     Temperature  normal      Drainage: none .      Color: none      Odor: none  Pain: denies   Pain interventions prior to dressing change: patient tolerated well and slow and gentle cares   Treatment goal: Heal , Maintain (prevention of deterioration), and Protection  STATUS: initial assessment this admission  Supplies ordered: supplies stored on unit    Treatment Plan:   Pannus fold - every 3 days  Cleanse with saline or water; gently dry.  Cover with Mepilex.    Orders: Written    RECOMMEND PRIMARY TEAM ORDER: None, at this time  Education provided: plan of care  Discussed plan of care with: Patient  Notify WOC if wound(s) deteriorate.  Nursing to notify the Provider(s) and re-consult the WOC Nurse if new skin concern.    DATA:     Current support surface: Standard  Standard gel mattress (Isoflex)  Containment of urine/stool: Continent of bladder and Continent of bowel  BMI: Body mass index is 50.78 kg/m .   Active diet order: Orders Placed This Encounter      Low Saturated Fat Na <2400 mg     Output: I/O last 3 completed shifts:  In: 360 [P.O.:360]  Out: 350 [Urine:350]     Labs:   Recent Labs   Lab 05/09/25  0522 05/08/25  1148   ALBUMIN  --  4.0   HGB 12.8 14.4   WBC 11.2* 6.2     Pressure injury risk assessment:   Sensory Perception: 3-->slightly limited  Moisture: 3-->occasionally moist  Activity: 2-->chairfast  Mobility: 2-->very limited  Nutrition: 3-->adequate  Friction and Shear: 2-->potential problem  Ben Score: 15    Laxmi Stroud, MSN RN CWOCN  Pager no longer is use, please contact through [place] group: [facility]

## 2025-05-10 LAB
ANION GAP SERPL CALCULATED.3IONS-SCNC: 7 MMOL/L (ref 7–15)
BACTERIA UR CULT: NO GROWTH
BASOPHILS # BLD AUTO: 0 10E3/UL (ref 0–0.2)
BASOPHILS NFR BLD AUTO: 0 %
BUN SERPL-MCNC: 21.5 MG/DL (ref 6–20)
CALCIUM SERPL-MCNC: 8.6 MG/DL (ref 8.8–10.4)
CHLORIDE SERPL-SCNC: 104 MMOL/L (ref 98–107)
CREAT SERPL-MCNC: 0.97 MG/DL (ref 0.51–0.95)
EGFRCR SERPLBLD CKD-EPI 2021: 71 ML/MIN/1.73M2
EOSINOPHIL # BLD AUTO: 0.1 10E3/UL (ref 0–0.7)
EOSINOPHIL NFR BLD AUTO: 1 %
ERYTHROCYTE [DISTWIDTH] IN BLOOD BY AUTOMATED COUNT: 17 % (ref 10–15)
GLUCOSE BLDC GLUCOMTR-MCNC: 103 MG/DL (ref 70–99)
GLUCOSE BLDC GLUCOMTR-MCNC: 88 MG/DL (ref 70–99)
GLUCOSE SERPL-MCNC: 103 MG/DL (ref 70–99)
HCO3 SERPL-SCNC: 26 MMOL/L (ref 22–29)
HCT VFR BLD AUTO: 36.4 % (ref 35–47)
HGB BLD-MCNC: 12.1 G/DL (ref 11.7–15.7)
IMM GRANULOCYTES # BLD: 0.1 10E3/UL
IMM GRANULOCYTES NFR BLD: 1 %
LYMPHOCYTES # BLD AUTO: 0.9 10E3/UL (ref 0.8–5.3)
LYMPHOCYTES NFR BLD AUTO: 10 %
MAGNESIUM SERPL-MCNC: 2 MG/DL (ref 1.7–2.3)
MCH RBC QN AUTO: 30.1 PG (ref 26.5–33)
MCHC RBC AUTO-ENTMCNC: 33.2 G/DL (ref 31.5–36.5)
MCV RBC AUTO: 91 FL (ref 78–100)
MONOCYTES # BLD AUTO: 0.8 10E3/UL (ref 0–1.3)
MONOCYTES NFR BLD AUTO: 9 %
NEUTROPHILS # BLD AUTO: 7.1 10E3/UL (ref 1.6–8.3)
NEUTROPHILS NFR BLD AUTO: 79 %
NRBC # BLD AUTO: 0 10E3/UL
NRBC BLD AUTO-RTO: 0 /100
PHOSPHATE SERPL-MCNC: 2.3 MG/DL (ref 2.5–4.5)
PLATELET # BLD AUTO: 160 10E3/UL (ref 150–450)
POTASSIUM SERPL-SCNC: 3.2 MMOL/L (ref 3.4–5.3)
RBC # BLD AUTO: 4.02 10E6/UL (ref 3.8–5.2)
SODIUM SERPL-SCNC: 137 MMOL/L (ref 135–145)
WBC # BLD AUTO: 9 10E3/UL (ref 4–11)

## 2025-05-10 PROCEDURE — 120N000004 HC R&B MS OVERFLOW

## 2025-05-10 PROCEDURE — 250N000011 HC RX IP 250 OP 636: Mod: JZ | Performed by: STUDENT IN AN ORGANIZED HEALTH CARE EDUCATION/TRAINING PROGRAM

## 2025-05-10 PROCEDURE — 999N000157 HC STATISTIC RCP TIME EA 10 MIN

## 2025-05-10 PROCEDURE — 250N000011 HC RX IP 250 OP 636: Performed by: STUDENT IN AN ORGANIZED HEALTH CARE EDUCATION/TRAINING PROGRAM

## 2025-05-10 PROCEDURE — 80051 ELECTROLYTE PANEL: CPT | Performed by: HOSPITALIST

## 2025-05-10 PROCEDURE — 258N000003 HC RX IP 258 OP 636: Performed by: STUDENT IN AN ORGANIZED HEALTH CARE EDUCATION/TRAINING PROGRAM

## 2025-05-10 PROCEDURE — 83735 ASSAY OF MAGNESIUM: CPT | Performed by: HOSPITALIST

## 2025-05-10 PROCEDURE — 250N000011 HC RX IP 250 OP 636: Performed by: INTERNAL MEDICINE

## 2025-05-10 PROCEDURE — 250N000013 HC RX MED GY IP 250 OP 250 PS 637: Performed by: STUDENT IN AN ORGANIZED HEALTH CARE EDUCATION/TRAINING PROGRAM

## 2025-05-10 PROCEDURE — 250N000009 HC RX 250: Performed by: INTERNAL MEDICINE

## 2025-05-10 PROCEDURE — 250N000013 HC RX MED GY IP 250 OP 250 PS 637: Performed by: HOSPITALIST

## 2025-05-10 PROCEDURE — 84100 ASSAY OF PHOSPHORUS: CPT | Performed by: HOSPITALIST

## 2025-05-10 PROCEDURE — 99232 SBSQ HOSP IP/OBS MODERATE 35: CPT | Performed by: INTERNAL MEDICINE

## 2025-05-10 PROCEDURE — 94660 CPAP INITIATION&MGMT: CPT

## 2025-05-10 PROCEDURE — 99223 1ST HOSP IP/OBS HIGH 75: CPT | Performed by: STUDENT IN AN ORGANIZED HEALTH CARE EDUCATION/TRAINING PROGRAM

## 2025-05-10 PROCEDURE — 99233 SBSQ HOSP IP/OBS HIGH 50: CPT | Performed by: HOSPITALIST

## 2025-05-10 PROCEDURE — 83605 ASSAY OF LACTIC ACID: CPT | Performed by: HOSPITALIST

## 2025-05-10 PROCEDURE — 36415 COLL VENOUS BLD VENIPUNCTURE: CPT | Performed by: HOSPITALIST

## 2025-05-10 PROCEDURE — 85018 HEMOGLOBIN: CPT | Performed by: HOSPITALIST

## 2025-05-10 RX ORDER — POTASSIUM CHLORIDE 1500 MG/1
60 TABLET, EXTENDED RELEASE ORAL ONCE
Status: COMPLETED | OUTPATIENT
Start: 2025-05-10 | End: 2025-05-10

## 2025-05-10 RX ADMIN — PIPERACILLIN AND TAZOBACTAM 4.5 G: 4; .5 INJECTION, POWDER, FOR SOLUTION INTRAVENOUS at 03:25

## 2025-05-10 RX ADMIN — DOBUTAMINE IN DEXTROSE 2.5 MCG/KG/MIN: 200 INJECTION, SOLUTION INTRAVENOUS at 14:24

## 2025-05-10 RX ADMIN — APIXABAN 5 MG: 5 TABLET, FILM COATED ORAL at 10:29

## 2025-05-10 RX ADMIN — POTASSIUM CHLORIDE 60 MEQ: 1500 TABLET, EXTENDED RELEASE ORAL at 10:31

## 2025-05-10 RX ADMIN — MICONAZOLE NITRATE: 20 POWDER TOPICAL at 11:08

## 2025-05-10 RX ADMIN — PIPERACILLIN AND TAZOBACTAM 4.5 G: 4; .5 INJECTION, POWDER, FOR SOLUTION INTRAVENOUS at 11:12

## 2025-05-10 RX ADMIN — ACETAMINOPHEN 650 MG: 325 TABLET ORAL at 03:27

## 2025-05-10 RX ADMIN — PIPERACILLIN AND TAZOBACTAM 4.5 G: 4; .5 INJECTION, POWDER, FOR SOLUTION INTRAVENOUS at 23:31

## 2025-05-10 RX ADMIN — FUROSEMIDE 10 MG/HR: 10 INJECTION, SOLUTION INTRAVENOUS at 11:17

## 2025-05-10 RX ADMIN — APIXABAN 5 MG: 5 TABLET, FILM COATED ORAL at 20:31

## 2025-05-10 RX ADMIN — ASPIRIN 81 MG: 81 TABLET, COATED ORAL at 10:29

## 2025-05-10 RX ADMIN — MICAFUNGIN SODIUM 150 MG: 50 INJECTION, POWDER, LYOPHILIZED, FOR SOLUTION INTRAVENOUS at 15:19

## 2025-05-10 RX ADMIN — ESCITALOPRAM OXALATE 10 MG: 10 TABLET ORAL at 10:29

## 2025-05-10 RX ADMIN — PIPERACILLIN AND TAZOBACTAM 4.5 G: 4; .5 INJECTION, POWDER, FOR SOLUTION INTRAVENOUS at 17:13

## 2025-05-10 RX ADMIN — SIMVASTATIN 10 MG: 10 TABLET, FILM COATED ORAL at 20:31

## 2025-05-10 RX ADMIN — MICONAZOLE NITRATE: 20 POWDER TOPICAL at 20:31

## 2025-05-10 ASSESSMENT — ACTIVITIES OF DAILY LIVING (ADL)
DEPENDENT_IADLS:: CLEANING;LAUNDRY;TRANSPORTATION
ADLS_ACUITY_SCORE: 58
ADLS_ACUITY_SCORE: 57
ADLS_ACUITY_SCORE: 58
ADLS_ACUITY_SCORE: 72
ADLS_ACUITY_SCORE: 71
ADLS_ACUITY_SCORE: 71
ADLS_ACUITY_SCORE: 72
ADLS_ACUITY_SCORE: 58
ADLS_ACUITY_SCORE: 72
ADLS_ACUITY_SCORE: 72
ADLS_ACUITY_SCORE: 58
ADLS_ACUITY_SCORE: 58
ADLS_ACUITY_SCORE: 72
ADLS_ACUITY_SCORE: 58
ADLS_ACUITY_SCORE: 72
ADLS_ACUITY_SCORE: 58
ADLS_ACUITY_SCORE: 72

## 2025-05-10 NOTE — CONSULTS
The consult asked if recent shingles with some open areas requires precautions or other workup. This requires care team assessment of the location of the lesions to determine if the infection is localized or disseminated. This may involve review of the Dermatome Diagram (in [employer]) to assess localized versus disseminated.     It appears Dr. Cabrera may have already assessed this as airborne isolation is discontinued.     Per system isolation and standard precautions guidance:     Localized Shingles infection require contact isolation until lesions are crusted.     Disseminated shingles infection requires airborne isolation until lesions are crusted.     .Chantal oFy, Infection Prevention  .5/10/2025  .11:10 AM

## 2025-05-10 NOTE — PROGRESS NOTES
Heart Failure Care Map  GOALS TO BE MET BEFORE DISCHARGE:    1. Decrease congestion and/or edema with diuretic therapy to achieve near optimal volume status.     Dyspnea improved: Yes, satisfactory for discharge.   Edema improved: No, further care required to meet this goal. Please explain BLE edema.        Last 24 hour I/O:   Intake/Output Summary (Last 24 hours) at 5/10/2025 1856  Last data filed at 5/10/2025 1721  Gross per 24 hour   Intake 1301.42 ml   Output 3325 ml   Net -2023.58 ml           Net I/O and Weights since admission:   04/10 2300 - 05/10 2259  In: 2021.42 [P.O.:1710; I.V.:311.42]  Out: 4575 [Urine:4575]  Net: -2553.58     Vitals:    05/09/25 0825   Weight: 134.2 kg (295 lb 13.7 oz)       2.  O2 sats > 90% on room air, or at prior home O2 therapy level.      Able to wean O2 this shift to keep sats above 90%?: Yes, satisfactory for discharge.   Does patient use Home O2? Yes-  3L          Current oxygenation status:   SpO2: 99 %     O2 Device: Nasal cannula, Oxygen Delivery: 3 LPM    3.  Tolerates ambulation and mobility near baseline.     Ambulation: No, further care required to meet this goal. Please explain Bed bound- PT/OT consulted today.   Times patient ambulated with staff this shift: 0    Please review the Heart Failure Care Map for additional HF goal outcomes.    Sunita Hester RN  5/10/2025

## 2025-05-10 NOTE — PROGRESS NOTES
"Imp/plan:  1. Pulmonary HTN - in setting of infection - on dobutamine and iv furosemide, possible CTEPH given prior PE, but also NARCISA.  After recovery from infection would plan VQ then right/left cath with vasodilator study (inpt or outpt). Cont dobutamine/furosemide     Review of Systems: 12 points negative other than above    BP 95/59   Pulse 87   Temp 97.7  F (36.5  C)   Resp 20   Ht 1.626 m (5' 4\")   Wt 134.2 kg (295 lb 13.7 oz)   LMP 04/08/2025 (Within Days)   SpO2 99%   BMI 50.78 kg/m    JVP<7cm, carotids normal  Lungs clear  Cor RRR no c,r,m  Abs soft +BS, no mass  Ext no c,c,e    Lab Results   Component Value Date    HGB 12.1 05/10/2025     Lab Results   Component Value Date     05/10/2025     No results found for: \"CREATININE\"  No components found for: \"K\"          Bob Herman MD  Interventional Cardiology   [facility]  [phone]    "

## 2025-05-10 NOTE — CONSULTS
Consultation - INFECTIOUS DISEASE CONSULTATION  Bess Enamorado,  1975, MRN [id number]      Severe sepsis (H) [A41.9, R65.20]    PCP: Mikala Luna, [phone]   Code status:  Full Code               Chief Complaint: Left lower extremity cellulitis with Pseudomonas bacteremia     Assessment:  Bess Enamorado is a 49 year old old female with   Super morbid obesity with BMI of 51 and hypoventilation syndrome.  Chronic respiratory failure, oxygen dependent on 3 L nasal cannula at home.  History of Staphylococcus lugdunensis bacteremia in 2024.  Bilateral lower extremity lymphedema  Recently history of thoracoabdominal shingles.  Diagnosed on 4/15/2025.  No active lesion.  Admitted with sepsis with temperature of 101.7 and mild leukocytosis.  Initially treated with azithromycin plus vancomycin plus Zosyn.  Now on Zosyn monotherapy.  Gram-negative juan manuel bacteremia.  Pseudomonas by PCR.  Source is left lower extremity cellulitis.  Positive blood culture on 2025.  Left lower extremity cellulitis  Candida intertrigo        Recommendations:   Continue IV Zosyn.  Follow-up pending ID and susceptibility of the GNR in the blood.  Treat stercoral colitis.  Monitor oxygen need.  Lymphedema care  Add IV micafungin for 72 hours.  Has prolonged QTc and as a result I am avoiding fluconazole.    Discussed with the patient, nursing staff.    Thank you for letting us be part of the patient care team. We will follow.    Thong Smith MD,MD  [facility].   [facility]  Office Telephone [phone].  Fax [phone]  [facility] paging    HPI:    Bess Enamorado is a 49 year old old female. History is provided by the patient, chart review.  ID is asked to see this patient for Pseudomonas bacteremia.  The patient has a history of hypermobility obesity with BMI of 51, hypoventilation syndrome, chronic respiratory failure on home oxygen at 3, recent diagnosis of thoracic  l/min, bilateral lower extremity lymphedema shingle on 4/15/2025.  Patient was admitted on 5/8/2025 with sepsis initially treated with Vanco plus Zosyn plus azithromycin.  Now on Zosyn monotherapy after blood cultures have come back positive with gram-negative rods.  Seen today, the patient is complaining of left lower extremity pain, swelling, tenderness.  Feels better today      ===========================================    Medical History  Past Medical History:   Diagnosis Date    Acute on chronic diastolic congestive heart failure (H) 02/09/2022    Arthritis 2019    Hips    ASCUS favor benign 08/2015    Neg HPV    Deep vein thrombosis (DVT) of left lower extremity (H) 03/25/2025    Depressive disorder 2010    H/O colposcopy with cervical biopsy 09/14/2015    Bx & ECC - negative    Hypertension 2019    LSIL on Pap smear 07/2014    Neg high risk HPV    Multiple sclerosis (H) 08/29/2005 9/18/200pt dx with MS 2004--dx by neurolgi and is followed by Dr. Harris    Myocardial infarction (H)     Obese     Pneumonia due to infectious organism, unspecified laterality, unspecified part of lung 02/08/2022    Sepsis (Temp 101, , WBC 13.0) 10/23/2018    Shingles 10/2016    SIRS (systemic inflammatory response syndrome) (H) 03/04/2021    Sleep apnea     UNSPEC CONSTIPATION 09/18/2006 9/18/2006   Has tried otc suppository and otc lax.         Surgical History  Past Surgical History:   Procedure Laterality Date    CHOLECYSTECTOMY, LAPOROSCOPIC      Cholecystectomy, Laparoscopic    COLONOSCOPY  2007?    Bathroom issue..results normal    COMBINED CYSTOSCOPY, RETROGRADES, EXCHANGE STENT URETER(S) Right 2/21/2022    Procedure: CYSTOSCOPY, WITH right RETROGRADE PYELOGRAM AND right URETERAL STENT PLACEMENT;  Surgeon: Doyle Palomares MD;  Location: [facility] OR    OPEN REDUCTION INTERNAL FIXATION TOE(S)  4/13/2012    Procedure:OPEN REDUCTION INTERNAL FIXATION TOE(S); Open reduction internal fixation right proximal  fifth metatarsal fracture-   Anes-choice block; Surgeon:LEY, JEFFREY DUANE; Location:[place] OR    PICC SINGLE LUMEN PLACEMENT  3/20/2024    PICC TRIPLE LUMEN PLACEMENT  2/21/2022                 Social History  Reviewed, and she  reports that she has never smoked. She has never used smokeless tobacco. She reports current alcohol use. She reports that she does not use drugs.        Family History  Family History   Problem Relation Age of Onset    Neurologic Disorder Father         MS    Heart Disease Father         irregular heart rate    Diabetes Father     Melanoma Father     Sleep Apnea Father     Other Cancer Father     Cancer Maternal Grandfather         lung    Other Cancer Maternal Grandfather     Cancer Paternal Grandmother         stomach    Other Cancer Paternal Grandmother     Cancer Mother     Other Cancer Mother     Thyroid Disease Mother     Gynecology Maternal Aunt         PCOS    Hypertension Maternal Grandmother     Anxiety Disorder Maternal Grandmother     Thyroid Disease Maternal Grandmother     Anxiety Disorder Sister       Psychosocial Needs  Social History     Social History Narrative    , 3rd-5th grade     Additional psychosocial needs reviewed per nursing assessment.       No Known Allergies     Medications Prior to Admission   Medication Sig Dispense Refill Last Dose/Taking    acetaminophen (TYLENOL) 650 MG CR tablet Take 1,300 mg by mouth every 8 hours as needed for mild pain or fever.   Taking As Needed    apixaban ANTICOAGULANT (ELIQUIS) 5 MG tablet Take 1 tablet (5 mg) by mouth 2 times daily. 90 tablet 3 5/7/2025 Evening    aspirin 81 MG EC tablet Take 81 mg by mouth every morning.   5/7/2025 Morning    bumetanide (BUMEX) 2 MG tablet Take 6 mg by mouth every morning.   5/7/2025 Morning    Emollient (GOLD BOND MEDICATED BODY EX) Externally apply 1 Application topically 2 times daily as needed   Taking As Needed    empagliflozin (JARDIANCE) 10 MG TABS tablet Take 10 mg  by mouth every morning.   5/7/2025 Morning    escitalopram (LEXAPRO) 10 MG tablet Take 10 mg by mouth every morning.   5/7/2025 Morning    miconazole (MICATIN) 2 % external powder Apply topically 2 times daily as needed for itching or other.   Taking As Needed    potassium chloride sheila ER (KLOR-CON M10) 10 MEQ CR tablet Take 30 mEq by mouth every morning.   5/7/2025 Morning    sennosides (SENOKOT) 8.6 MG tablet Take 1 tablet by mouth 2 times daily as needed for constipation. 20 tablet 0 Taking As Needed    simvastatin (ZOCOR) 10 MG tablet Take 1 tablet (10 mg) by mouth at bedtime. 90 tablet 3 5/7/2025 Bedtime    spironolactone (ALDACTONE) 25 MG tablet Take 12.5 mg by mouth every morning.   5/7/2025 Morning        Review of Systems:  Negative except for findings in the HPI Physical Exam:  Temp:  [97.7  F (36.5  C)-100.4  F (38  C)] 97.7  F (36.5  C)  Pulse:  [] 87  Resp:  [19-26] 20  BP: ()/(49-67) 95/59  FiO2 (%):  [25 %] 25 %  SpO2:  [91 %-100 %] 99 %      Gen: Pleasant in no acute distress.  HEENT: NCAT. EOMI. PERRL.  Neck: No bruit, JVD or thyromegaly.  Lungs: Clear to ascultation bilat with no crackles or wheezes.  Card: RRR. NSR. No RMG. Peripheral pulses present and symmetric. No edema.  Abd: Soft NT ND. No mass. Normal bowel sounds.  Skin: No rash.  Extr: Extensive cellulitis of the left lower extremity with warmth, tenderness, erythema.  Neuro: Alert and oriented to place time and person. Cranial nerves II to XII intact.   Media Information    Media Information         ============================    Pertinent Labs  personally reviewed.   Recent Labs   Lab 05/10/25  0529 05/09/25  0522 05/08/25  1148   WBC 9.0 11.2* 6.2   HGB 12.1 12.8 14.4   HCT 36.4 39.4 45.6    187 205       Recent Labs   Lab 05/10/25  0529 05/09/25  0522 05/08/25  1148    135 139   CO2 26 27 29   BUN 21.5* 25.3* 22.8*   ALBUMIN  --   --  4.0   ALKPHOS  --   --  78   ALT  --   --  8   AST  --   --  15        MICROBIOLOGY DATA:  Personally reviewed   Contains critical data Blood Culture Peripheral blood (BC) Arm, Left  Order: [id number]   Collected 5/8/2025 11:48 AM       Status: Preliminary result    Test Result Released: No    Specimen Information: Arm, Left; Peripheral blood (BC)   0 Result Notes  Culture Positive on the 1st day of incubation Abnormal    Gram negative bacilli Panic    1 of 2 bottles        Resulting Agency: [facility]          Specimen Collected: 05/08/25 11:48 AM Last Resulted: 05/09/25  8:16 AM       Pertinent Radiology  personally reviewed.   Study Result    Narrative & Impression   EXAM: CT FEMUR THIGH BILATERAL W CONTRAST, CT TIBIA FIBULA LOWER LEG LEFT W CONTRAST  LOCATION: [facility]  DATE: 5/9/2025     INDICATION: Concern for Cellulitis Tissue Gas  COMPARISON: 7/18/2024 CT of a portion of the left leg.  TECHNIQUE: IV contrast. Axial, sagittal and coronal thin-section reconstruction. Dose reduction techniques were used.   CONTRAST: isovue 370 100ml     FINDINGS:      BONES AND JOINTS:  -Mild osteoarthrosis of the hips. Degenerative changes and varus angulation in the knees. Degenerative changes in the hindfoot and midfoot. No fracture or effusion. No cortical destruction or periosteal reaction. Nothing to suggest osteomyelitis or   septic joint.     SOFT TISSUES:  -Partially imaged right adnexal mass containing calcification, soft tissue and fat, consistent with a dermoid cyst.     There is superficial soft tissue edema in both legs, left greater than right, and particularly medially. The imaged portion is similar when compared to the prior study. This extends from the pelvis distally into the visualized portion of the foot. There   is marked skin thickening and skin nodules diffusely, particularly in the lower left leg. There is no evidence of soft tissue gas or foreign body. No deep soft tissue abnormalities evident within the moderate gastrocnemius and soleus muscle  atrophy   diffusely.                                                                      IMPRESSION:  1.  Nonspecific superficial soft tissue edema diffusely in both legs, left greater than right. There is also marked skin thickening and skin nodules diffusely, particularly in the lower left leg. No deep soft tissue abnormalities evident.  2.  There is no evidence of abscess, soft tissue gas or foreign body.  3.  There is no evidence of osteomyelitis or septic joint.  4.  Partially imaged right adnexal mass containing calcification, soft tissue and fat, consistent with a dermoid cyst.  5.  Mild osteoarthrosis of the hips. Degenerative changes in the knees, hindfoot and midfoot.  6.  Moderate gastrocnemius and soleus muscle atrophy diffusely.  7.  Partially imaged right adnexal mass which has features of an ovarian dermoid cyst.           Narrative & Impression   EXAM: CT ABDOMEN PELVIS W CONTRAST  LOCATION: [facility]  DATE: 5/9/2025     INDICATION: Concern for intra abdominal infection     COMPARISON: 3/22/2025     TECHNIQUE: CT scan of the abdomen and pelvis was performed following injection of IV contrast. Multiplanar reformats were obtained. Dose reduction techniques were used.  CONTRAST: isovue 370 100ml     FINDINGS:   LOWER CHEST: Prominent mitral annular calcification. Right atrium and ventricle appear enlarged. Pulmonary arteries appear dilated.     HEPATOBILIARY: Mild hepatomegaly. Prior cholecystectomy. No suspicious liver lesion.     PANCREAS: Normal.     SPLEEN: Borderline enlarged.     ADRENAL GLANDS: Normal.     KIDNEYS/BLADDER: Normal.     BOWEL: Large amount of stool in the rectum. There is mild rectal wall thickening and surrounding inflammation. Small bowel normal in caliber. No free air or free fluid. No abscess. Normal caliber appendix.     LYMPH NODES: Shotty retroperitoneal lymph nodes are present, unchanged.     VASCULATURE: Normal.     PELVIC ORGANS: Right ovarian  dermoid measures 8.6 x 9.1 x 9.8 cm. Uterus and left adnexa unremarkable.     MUSCULOSKELETAL: Normal.                                                                      IMPRESSION:   1.  Rectal fecal impaction and associated stercoral colitis.  2.  Mild hepatomegaly.  3.  Right ovarian dermoid measuring 8.6 x 9.1 x 9.8 cm.       Study Result    Narrative & Impression   EXAM: XR CHEST 2 VIEWS  LOCATION: [facility]  DATE: 5/8/2025     INDICATION: Fever  COMPARISON: 3/22/2025                                                                      IMPRESSION: The lungs are hypoinflated with patchy bibasilar atelectasis. Mild elevation of the right hemidiaphragm. Cardiomegaly and mild pulmonary vascular congestion without overt pulmonary edema. No acute osseous findings.

## 2025-05-10 NOTE — PLAN OF CARE
Problem: Comorbidity Management  Goal: Blood Glucose Levels Within Targeted Range  Outcome: Progressing  Intervention: Monitor and Manage Glycemia  Recent Flowsheet Documentation  Taken 5/10/2025 0400 by Bessy Murry RN  Medication Review/Management: medications reviewed  Taken 5/10/2025 0000 by Bessy Murry RN  Medication Review/Management: medications reviewed  Taken 5/9/2025 2000 by Bessy Murry RN  Medication Review/Management: medications reviewed     Problem: Comorbidity Management  Goal: Blood Pressure in Desired Range  Outcome: Progressing  Intervention: Maintain Blood Pressure Management  Recent Flowsheet Documentation  Taken 5/10/2025 0400 by Bessy Murry RN  Medication Review/Management: medications reviewed  Taken 5/10/2025 0000 by Bessy Murry RN  Medication Review/Management: medications reviewed  Taken 5/9/2025 2000 by Bessy Murry RN  Medication Review/Management: medications reviewed     Problem: Pain Acute  Goal: Optimal Pain Control and Function  Outcome: Progressing  Intervention: Prevent or Manage Pain  Recent Flowsheet Documentation  Taken 5/10/2025 0400 by Bessy Murry RN  Sensory Stimulation Regulation:   lighting decreased   quiet environment promoted  Sleep/Rest Enhancement:   noise level reduced   regular sleep/rest pattern promoted   room darkened  Medication Review/Management: medications reviewed  Taken 5/10/2025 0000 by Bessy Murry RN  Sensory Stimulation Regulation:   lighting decreased   quiet environment promoted  Sleep/Rest Enhancement:   noise level reduced   regular sleep/rest pattern promoted   room darkened  Medication Review/Management: medications reviewed  Taken 5/9/2025 2000 by Bessy Murry RN  Sensory Stimulation Regulation:   lighting decreased   quiet environment promoted  Sleep/Rest Enhancement:   noise level reduced   regular sleep/rest pattern promoted   room darkened  Medication Review/Management:  medications reviewed     Problem: Infection  Goal: Absence of Infection Signs and Symptoms  Outcome: Progressing  Intervention: Prevent or Manage Infection  Recent Flowsheet Documentation  Taken 5/10/2025 0400 by Bessy Murry RN  Isolation Precautions:   contact precautions maintained   airborne precautions initiated  Taken 5/10/2025 0000 by Bessy Murry RN  Isolation Precautions:   contact precautions maintained   airborne precautions initiated  Taken 5/9/2025 2000 by Bessy Murry RN  Isolation Precautions:   contact precautions maintained   airborne precautions initiated     Problem: Gas Exchange Impaired  Goal: Optimal Gas Exchange  Outcome: Progressing  Intervention: Optimize Oxygenation and Ventilation  Recent Flowsheet Documentation  Taken 5/10/2025 0400 by Bessy Murry RN  Head of Bed (HOB) Positioning: HOB at 30-45 degrees  Taken 5/10/2025 0000 by Bessy Murry RN  Head of Bed (HOB) Positioning: HOB at 30-45 degrees  Taken 5/9/2025 2000 by Bessy Murry RN  Head of Bed (HOB) Positioning: HOB at 30-45 degrees     Goal Outcome Evaluation:  Pt denies pain. Fever of 100.4 overnight. Pt given tylenol with good temp after. Pt placed on airborne precautions until infection prevention consult today based on recent history of shingles. Dobutamine gtt running at 2.5mcg/kg/min. Lasix gtt running at 10mg/hr. Purewick in place for accurate urine output. O2 sats in the upper 90s on 3L NC. Bipap on overnight and pt tolerated well.  at HS. A/O but drowsy between cares. VSS. Will continue to monitor and notify MD of any changes.

## 2025-05-10 NOTE — CONSULTS
Care Management Initial Consult    General Information  Assessment completed with: Patient,         Primary Care Provider verified and updated as needed:     Readmission within the last 30 days: current reason for admission unrelated to previous admission      Reason for Consult: discharge planning  Advance Care Planning: Advance Care Planning Reviewed: no concerns identified          Communication Assessment  Patient's communication style: spoken language (English or Bilingual)    Hearing Difficulty or Deaf: no   Wear Glasses or Blind: yes    Cognitive  Cognitive/Neuro/Behavioral: WDL  Level of Consciousness: other (see comments) (alert/oriented but drowsy)  Arousal Level: opens eyes spontaneously  Orientation: oriented x 4  Mood/Behavior: cooperative     Speech: clear, logical    Living Environment:   People in home: alone     Current living Arrangements: town home      Able to return to prior arrangements: yes       Family/Social Support:  Care provided by: self, homecare agency  Provides care for: no one     Support system:            Description of Support System:           Current Resources:   Patient receiving home care services: Yes        Community Resources: Home Care  Equipment currently used at home: wheelchair, manual, walker, rolling  Supplies currently used at home: Incontinence Supplies, Oxygen Tubing/Supplies, Other    Employment/Financial:  Employment Status: disabled        Financial Concerns: none           Does the patient's insurance plan have a 3 day qualifying hospital stay waiver?  No    Lifestyle & Psychosocial Needs:  Social Drivers of Health     Food Insecurity: Low Risk  (5/8/2025)    Food Insecurity     Within the past 12 months, did you worry that your food would run out before you got money to buy more?: No     Within the past 12 months, did the food you bought just not last and you didn t have money to get more?: No   Recent Concern: Food Insecurity - High Risk (3/23/2025)    Food  Insecurity     Within the past 12 months, did you worry that your food would run out before you got money to buy more?: Yes     Within the past 12 months, did the food you bought just not last and you didn t have money to get more?: No   Depression: Not at risk (4/1/2025)    PHQ-2     PHQ-2 Score: 2   Housing Stability: High Risk (5/8/2025)    Housing Stability     Do you have housing? : No     Are you worried about losing your housing?: No   Tobacco Use: Low Risk  (4/15/2025)    Patient History     Smoking Tobacco Use: Never     Smokeless Tobacco Use: Never     Passive Exposure: Not on file   Financial Resource Strain: Low Risk  (5/8/2025)    Financial Resource Strain     Within the past 12 months, have you or your family members you live with been unable to get utilities (heat, electricity) when it was really needed?: No   Alcohol Use: Not on file   Transportation Needs: High Risk (5/8/2025)    Transportation Needs     Within the past 12 months, has lack of transportation kept you from medical appointments, getting your medicines, non-medical meetings or appointments, work, or from getting things that you need?: Yes   Physical Activity: Inactive (3/21/2024)    Exercise Vital Sign     Days of Exercise per Week: 0 days     Minutes of Exercise per Session: 0 min   Interpersonal Safety: Low Risk  (5/8/2025)    Interpersonal Safety     Do you feel physically and emotionally safe where you currently live?: Yes     Within the past 12 months, have you been hit, slapped, kicked or otherwise physically hurt by someone?: No     Within the past 12 months, have you been humiliated or emotionally abused in other ways by your partner or ex-partner?: No   Recent Concern: Interpersonal Safety - High Risk (3/23/2025)    Interpersonal Safety     Do you feel physically and emotionally safe where you currently live?: No     Within the past 12 months, have you been hit, slapped, kicked or otherwise physically hurt by someone?: No      Within the past 12 months, have you been humiliated or emotionally abused in other ways by your partner or ex-partner?: No   Stress: No Stress Concern Present (12/4/2020)    [employer] - Occupational Stress Questionnaire     Feeling of Stress : Only a little   Social Connections: Unknown (3/1/2022)    Social Connection and Isolation Panel [NHANES]     Frequency of Communication with Friends and Family: Twice a week     Frequency of Social Gatherings with Friends and Family: Twice a week     Attends Gnosticism Services: Not on file     Active Member of Clubs or Organizations: Not on file     Attends Club or Organization Meetings: Not on file     Marital Status: Not on file   Health Literacy: Not on file       Functional Status:  Prior to admission patient needed assistance:   Dependent ADLs:: Ambulation-walker, Wheelchair-independent  Dependent IADLs:: Cleaning, Laundry, Transportation       Mental Health Status:          Chemical Dependency Status:                Values/Beliefs:  Spiritual, Cultural Beliefs, Gnosticism Practices, Values that affect care: no               Discussed  Partnership in Safe Discharge Planning  document with patient/family: No    Additional Information:    CM consult received.  Chart reviewed, met with patient in her room.  She confirms this is 4th hospitalization since February.  She confirms has been open to [facility] for home care needs.  She reports has all needed DME at home, also previous TCU stay at [place].  She confirms waiting for therapy here; states if recs for TCU she would like referral sent to [place] again, reporting good care and support while there.  She will need medical transportation to TCU or home upon discharge.     Next Steps:     CM continues to follow to assist with/support discharge needs.  Message with [facility] re this hospitalization.  Will watch for therapy recs, assist with scheduling transportation upon  discharge.    Chantal Askew, BSW

## 2025-05-10 NOTE — PROGRESS NOTES
"[facility]    Medicine Progress Note - Hospitalist Service    Date of Admission:  5/8/2025    Assessment & Plan   49-year-old female with pulmonary HTN, chronic respiratory failure and recent DVT presented with shortness of breath and weakness and found to be septic and hypoxic.    #Severe sepsis with endorgan dysfunction involving lactic acidosis, worsened respiratory failure  #Gram-negative bacteremia  #Cellulitis, LLE  #Recent shingles, L flank  Blood culture positive for Pseudomonas  MRSA screen negative  Continue Zosyn and follow culture sensitivities  S/p IV fluid hydration 2.5L  CT showed no abscess  Consult ID    #Acute on chronic HFpEF  #Pulmonary hypertension  #Acute on chronic respiratory failure with hypoxia and hypercapnia  S/p 10 L oximask, BiPAP, now weaned to nasal cannula  Cardiology consulted: dobutamine and Lasix drips  Holding PTA spironolactone, Bumex    #Recent DVT, LLE  #Chronic lymphedema  PTA Eliquis  Lymph team consulted    #Prediabetes  #Hypoglycemia, resolved  Stop SSI and Accu-Cheks    #Morbid obesity  BMI 50      VTE PPx: DOAC          Diet: Low Saturated Fat Na <2400 mg    Brar Catheter: Not present  Lines: None     Cardiac Monitoring: ACTIVE order. Indication: Tachyarrhythmias, acute (48 hours)  Code Status: Full Code      Clinically Significant Risk Factors        # Hypokalemia: Lowest K = 3.2 mmol/L in last 2 days, will replace as needed    # Hypocalcemia: Lowest Ca = 8.6 mg/dL in last 2 days, will monitor and replace as appropriate         # Hypertension: Noted on problem list  # Acute heart failure with preserved ejection fraction: heart failure noted on problem list, last echo with EF >50%, and receiving IV diuretics           # Morbid Obesity: Estimated body mass index is 50.78 kg/m  as calculated from the following:    Height as of this encounter: 1.626 m (5' 4\").    Weight as of this encounter: 134.2 kg (295 lb 13.7 oz)., PRESENT ON ADMISSION     # " Financial/Environmental Concerns:           Social Drivers of Health    Food Insecurity: Low Risk  (5/8/2025)    Food Insecurity     Within the past 12 months, did you worry that your food would run out before you got money to buy more?: No     Within the past 12 months, did the food you bought just not last and you didn t have money to get more?: No   Recent Concern: Food Insecurity - High Risk (3/23/2025)    Food Insecurity     Within the past 12 months, did you worry that your food would run out before you got money to buy more?: Yes     Within the past 12 months, did the food you bought just not last and you didn t have money to get more?: No   Housing Stability: High Risk (5/8/2025)    Housing Stability     Do you have housing? : No     Are you worried about losing your housing?: No   Transportation Needs: High Risk (5/8/2025)    Transportation Needs     Within the past 12 months, has lack of transportation kept you from medical appointments, getting your medicines, non-medical meetings or appointments, work, or from getting things that you need?: Yes   Physical Activity: Inactive (3/21/2024)    Exercise Vital Sign     Days of Exercise per Week: 0 days     Minutes of Exercise per Session: 0 min   Interpersonal Safety: Low Risk  (5/8/2025)    Interpersonal Safety     Do you feel physically and emotionally safe where you currently live?: Yes     Within the past 12 months, have you been hit, slapped, kicked or otherwise physically hurt by someone?: No     Within the past 12 months, have you been humiliated or emotionally abused in other ways by your partner or ex-partner?: No   Recent Concern: Interpersonal Safety - High Risk (3/23/2025)    Interpersonal Safety     Do you feel physically and emotionally safe where you currently live?: No     Within the past 12 months, have you been hit, slapped, kicked or otherwise physically hurt by someone?: No     Within the past 12 months, have you been humiliated or  emotionally abused in other ways by your partner or ex-partner?: No   Social Connections: Unknown (3/1/2022)    Social Connection and Isolation Panel [NHANES]     Frequency of Communication with Friends and Family: Twice a week     Frequency of Social Gatherings with Friends and Family: Twice a week          Disposition Plan     Medically Ready for Discharge: Anticipated in 2-4 Days             Michael Cabrera DO  Hospitalist Service  [facility]  Securely message with [employer] (more info)  Text page via [employer] Paging/Directory   ______________________________________________________________________    Interval History   No acute events overnight    Physical Exam   Vital Signs: Temp: 98.3  F (36.8  C) Temp src: Axillary BP: 101/64 Pulse: 92   Resp: 24 SpO2: 98 % O2 Device: Nasal cannula Oxygen Delivery: 3 LPM  Weight: 295 lbs 13.72 oz    General Appearance:  No acute distress  Respiratory: Nonlabored breathing  Open wound L flank between skin folds      Medical Decision Making       50 MINUTES SPENT BY ME on the date of service doing chart review, history, exam, documentation & further activities per the note.      Data

## 2025-05-10 NOTE — PLAN OF CARE
Problem: Adult Inpatient Plan of Care  Goal: Optimal Comfort and Wellbeing  5/10/2025 1851 by Sunita Hester RN  Outcome: Progressing     Problem: Comorbidity Management  Goal: Maintenance of COPD Symptom Control  5/10/2025 1851 by Sunita Hester RN  Outcome: Progressing     Problem: Comorbidity Management  Goal: Blood Glucose Levels Within Targeted Range  5/10/2025 1851 by Sunita Hester RN  Outcome: Progressing     Problem: Comorbidity Management  Goal: Blood Pressure in Desired Range  5/10/2025 1851 by Sunita Hester RN  Outcome: Progressing     Problem: Comorbidity Management  Goal: Maintenance of Heart Failure Symptom Control  5/10/2025 1851 by Sunita Hester RN  Outcome: Progressing     Problem: Infection  Goal: Absence of Infection Signs and Symptoms  5/10/2025 1851 by Sunita Hester RN  Outcome: Progressing     Problem: Sepsis/Septic Shock  Goal: Absence of Infection Signs and Symptoms  5/10/2025 1851 by Sunita Hester RN  Outcome: Progressing     Pt A/Ox4. Denied pain and SOB. Weaned off bipap this AM and placed 3L NC 02, pt tolerated well sating >90% per orders. Pt refusing repositioning, educated pt on importance of maintaining skin integrity and pt verbalized understanding to writer. Narawick in place for I/O. Lasix gtt at 10mg/hr and dobutamine gtt at 2.5 mcg/kg/min per orders. VSS. Contact and airborne precautions removed per Dr. Cabrera order. One time dose of PO K given per orders for K of 3.2, AM recheck. Plan of care continues.

## 2025-05-11 ENCOUNTER — APPOINTMENT (OUTPATIENT)
Dept: PHYSICAL THERAPY | Facility: HOSPITAL | Age: 50
End: 2025-05-11
Attending: HOSPITALIST
Payer: COMMERCIAL

## 2025-05-11 ENCOUNTER — APPOINTMENT (OUTPATIENT)
Dept: OCCUPATIONAL THERAPY | Facility: HOSPITAL | Age: 50
End: 2025-05-11
Attending: HOSPITALIST
Payer: COMMERCIAL

## 2025-05-11 LAB
ANION GAP SERPL CALCULATED.3IONS-SCNC: 8 MMOL/L (ref 7–15)
BACTERIA SPEC CULT: ABNORMAL
BASE EXCESS BLDV CALC-SCNC: 2.9 MMOL/L (ref -3–3)
BASOPHILS # BLD AUTO: 0 10E3/UL (ref 0–0.2)
BASOPHILS NFR BLD AUTO: 1 %
BUN SERPL-MCNC: 15.7 MG/DL (ref 6–20)
CALCIUM SERPL-MCNC: 9.3 MG/DL (ref 8.8–10.4)
CHLORIDE SERPL-SCNC: 104 MMOL/L (ref 98–107)
CREAT SERPL-MCNC: 0.8 MG/DL (ref 0.51–0.95)
EGFRCR SERPLBLD CKD-EPI 2021: 89 ML/MIN/1.73M2
EOSINOPHIL # BLD AUTO: 0.3 10E3/UL (ref 0–0.7)
EOSINOPHIL NFR BLD AUTO: 4 %
ERYTHROCYTE [DISTWIDTH] IN BLOOD BY AUTOMATED COUNT: 16.9 % (ref 10–15)
GLUCOSE SERPL-MCNC: 90 MG/DL (ref 70–99)
HCO3 BLDV-SCNC: 28 MMOL/L (ref 21–28)
HCO3 SERPL-SCNC: 24 MMOL/L (ref 22–29)
HCT VFR BLD AUTO: 38.4 % (ref 35–47)
HGB BLD-MCNC: 12.1 G/DL (ref 11.7–15.7)
HOLD SPECIMEN: NORMAL
HOLD SPECIMEN: NORMAL
IMM GRANULOCYTES # BLD: 0 10E3/UL
IMM GRANULOCYTES NFR BLD: 0 %
LACTATE SERPL-SCNC: 1.3 MMOL/L (ref 0.7–2)
LYMPHOCYTES # BLD AUTO: 0.9 10E3/UL (ref 0.8–5.3)
LYMPHOCYTES NFR BLD AUTO: 12 %
MCH RBC QN AUTO: 28.9 PG (ref 26.5–33)
MCHC RBC AUTO-ENTMCNC: 31.5 G/DL (ref 31.5–36.5)
MCV RBC AUTO: 92 FL (ref 78–100)
MONOCYTES # BLD AUTO: 0.5 10E3/UL (ref 0–1.3)
MONOCYTES NFR BLD AUTO: 7 %
NEUTROPHILS # BLD AUTO: 5.5 10E3/UL (ref 1.6–8.3)
NEUTROPHILS NFR BLD AUTO: 76 %
NRBC # BLD AUTO: 0 10E3/UL
NRBC BLD AUTO-RTO: 0 /100
O2/TOTAL GAS SETTING VFR VENT: 4 %
OXYHGB MFR BLDV: 70 % (ref 70–75)
PCO2 BLDV: 41 MM HG (ref 40–50)
PH BLDV: 7.43 [PH] (ref 7.32–7.43)
PLATELET # BLD AUTO: 167 10E3/UL (ref 150–450)
PO2 BLDV: 35 MM HG (ref 25–47)
POTASSIUM SERPL-SCNC: 3.3 MMOL/L (ref 3.4–5.3)
RBC # BLD AUTO: 4.18 10E6/UL (ref 3.8–5.2)
SAO2 % BLDV: 71.2 % (ref 70–75)
SODIUM SERPL-SCNC: 136 MMOL/L (ref 135–145)
WBC # BLD AUTO: 7.2 10E3/UL (ref 4–11)

## 2025-05-11 PROCEDURE — 250N000011 HC RX IP 250 OP 636: Performed by: STUDENT IN AN ORGANIZED HEALTH CARE EDUCATION/TRAINING PROGRAM

## 2025-05-11 PROCEDURE — 250N000013 HC RX MED GY IP 250 OP 250 PS 637: Performed by: HOSPITALIST

## 2025-05-11 PROCEDURE — 250N000011 HC RX IP 250 OP 636: Performed by: HOSPITALIST

## 2025-05-11 PROCEDURE — 250N000013 HC RX MED GY IP 250 OP 250 PS 637: Performed by: STUDENT IN AN ORGANIZED HEALTH CARE EDUCATION/TRAINING PROGRAM

## 2025-05-11 PROCEDURE — 999N000157 HC STATISTIC RCP TIME EA 10 MIN

## 2025-05-11 PROCEDURE — 36415 COLL VENOUS BLD VENIPUNCTURE: CPT | Performed by: HOSPITALIST

## 2025-05-11 PROCEDURE — 258N000003 HC RX IP 258 OP 636: Performed by: STUDENT IN AN ORGANIZED HEALTH CARE EDUCATION/TRAINING PROGRAM

## 2025-05-11 PROCEDURE — 85004 AUTOMATED DIFF WBC COUNT: CPT | Performed by: HOSPITALIST

## 2025-05-11 PROCEDURE — 250N000011 HC RX IP 250 OP 636: Mod: JZ | Performed by: STUDENT IN AN ORGANIZED HEALTH CARE EDUCATION/TRAINING PROGRAM

## 2025-05-11 PROCEDURE — 82374 ASSAY BLOOD CARBON DIOXIDE: CPT | Performed by: HOSPITALIST

## 2025-05-11 PROCEDURE — 99232 SBSQ HOSP IP/OBS MODERATE 35: CPT | Performed by: INTERNAL MEDICINE

## 2025-05-11 PROCEDURE — 120N000004 HC R&B MS OVERFLOW

## 2025-05-11 PROCEDURE — 97165 OT EVAL LOW COMPLEX 30 MIN: CPT | Mod: GO

## 2025-05-11 PROCEDURE — 94660 CPAP INITIATION&MGMT: CPT

## 2025-05-11 PROCEDURE — 99233 SBSQ HOSP IP/OBS HIGH 50: CPT | Performed by: STUDENT IN AN ORGANIZED HEALTH CARE EDUCATION/TRAINING PROGRAM

## 2025-05-11 PROCEDURE — 99233 SBSQ HOSP IP/OBS HIGH 50: CPT | Performed by: HOSPITALIST

## 2025-05-11 PROCEDURE — 97535 SELF CARE MNGMENT TRAINING: CPT | Mod: GO

## 2025-05-11 PROCEDURE — 250N000011 HC RX IP 250 OP 636: Performed by: INTERNAL MEDICINE

## 2025-05-11 PROCEDURE — 97162 PT EVAL MOD COMPLEX 30 MIN: CPT | Mod: GP | Performed by: PHYSICAL THERAPIST

## 2025-05-11 PROCEDURE — 82805 BLOOD GASES W/O2 SATURATION: CPT | Performed by: HOSPITALIST

## 2025-05-11 PROCEDURE — 97530 THERAPEUTIC ACTIVITIES: CPT | Mod: GP | Performed by: PHYSICAL THERAPIST

## 2025-05-11 RX ORDER — VANCOMYCIN HYDROCHLORIDE 125 MG/1
125 CAPSULE ORAL DAILY
Status: DISCONTINUED | OUTPATIENT
Start: 2025-05-11 | End: 2025-05-13

## 2025-05-11 RX ORDER — FUROSEMIDE 10 MG/ML
40 INJECTION INTRAMUSCULAR; INTRAVENOUS 3 TIMES DAILY
Status: DISCONTINUED | OUTPATIENT
Start: 2025-05-11 | End: 2025-05-12

## 2025-05-11 RX ORDER — POTASSIUM CHLORIDE 1500 MG/1
60 TABLET, EXTENDED RELEASE ORAL ONCE
Status: COMPLETED | OUTPATIENT
Start: 2025-05-11 | End: 2025-05-11

## 2025-05-11 RX ADMIN — MICONAZOLE NITRATE: 20 POWDER TOPICAL at 10:02

## 2025-05-11 RX ADMIN — ESCITALOPRAM OXALATE 10 MG: 10 TABLET ORAL at 10:01

## 2025-05-11 RX ADMIN — PIPERACILLIN AND TAZOBACTAM 4.5 G: 4; .5 INJECTION, POWDER, FOR SOLUTION INTRAVENOUS at 17:32

## 2025-05-11 RX ADMIN — MICONAZOLE NITRATE: 20 POWDER TOPICAL at 20:23

## 2025-05-11 RX ADMIN — VANCOMYCIN HYDROCHLORIDE 125 MG: 125 CAPSULE ORAL at 17:34

## 2025-05-11 RX ADMIN — PIPERACILLIN AND TAZOBACTAM 4.5 G: 4; .5 INJECTION, POWDER, FOR SOLUTION INTRAVENOUS at 05:09

## 2025-05-11 RX ADMIN — APIXABAN 5 MG: 5 TABLET, FILM COATED ORAL at 20:23

## 2025-05-11 RX ADMIN — DOBUTAMINE IN DEXTROSE 1.25 MCG/KG/MIN: 200 INJECTION, SOLUTION INTRAVENOUS at 15:58

## 2025-05-11 RX ADMIN — PIPERACILLIN AND TAZOBACTAM 4.5 G: 4; .5 INJECTION, POWDER, FOR SOLUTION INTRAVENOUS at 11:16

## 2025-05-11 RX ADMIN — SIMVASTATIN 10 MG: 10 TABLET, FILM COATED ORAL at 20:23

## 2025-05-11 RX ADMIN — MICAFUNGIN SODIUM 150 MG: 50 INJECTION, POWDER, LYOPHILIZED, FOR SOLUTION INTRAVENOUS at 15:22

## 2025-05-11 RX ADMIN — APIXABAN 5 MG: 5 TABLET, FILM COATED ORAL at 10:01

## 2025-05-11 RX ADMIN — ASPIRIN 81 MG: 81 TABLET, COATED ORAL at 10:01

## 2025-05-11 RX ADMIN — FUROSEMIDE 40 MG: 10 INJECTION, SOLUTION INTRAMUSCULAR; INTRAVENOUS at 20:18

## 2025-05-11 RX ADMIN — SENNOSIDES AND DOCUSATE SODIUM 2 TABLET: 50; 8.6 TABLET ORAL at 20:41

## 2025-05-11 RX ADMIN — PIPERACILLIN AND TAZOBACTAM 4.5 G: 4; .5 INJECTION, POWDER, FOR SOLUTION INTRAVENOUS at 23:03

## 2025-05-11 RX ADMIN — POTASSIUM CHLORIDE 60 MEQ: 1500 TABLET, EXTENDED RELEASE ORAL at 10:02

## 2025-05-11 ASSESSMENT — ACTIVITIES OF DAILY LIVING (ADL)
ADLS_ACUITY_SCORE: 73
ADLS_ACUITY_SCORE: 71
ADLS_ACUITY_SCORE: 72
ADLS_ACUITY_SCORE: 69
ADLS_ACUITY_SCORE: 72
ADLS_ACUITY_SCORE: 73
ADLS_ACUITY_SCORE: 72
ADLS_ACUITY_SCORE: 73
ADLS_ACUITY_SCORE: 72
ADLS_ACUITY_SCORE: 73
ADLS_ACUITY_SCORE: 72
ADLS_ACUITY_SCORE: 69
ADLS_ACUITY_SCORE: 72
ADLS_ACUITY_SCORE: 72
ADLS_ACUITY_SCORE: 73
ADLS_ACUITY_SCORE: 72

## 2025-05-11 NOTE — PLAN OF CARE
Problem: Comorbidity Management  Goal: Blood Pressure in Desired Range  Outcome: Progressing  Intervention: Maintain Blood Pressure Management  Recent Flowsheet Documentation  Taken 5/11/2025 0400 by Bessy Murry RN  Medication Review/Management: medications reviewed  Taken 5/11/2025 0000 by Bessy Murry RN  Medication Review/Management: medications reviewed  Taken 5/10/2025 2000 by Besys Murry RN  Medication Review/Management: medications reviewed     Problem: Pain Acute  Goal: Optimal Pain Control and Function  Outcome: Progressing  Intervention: Prevent or Manage Pain  Recent Flowsheet Documentation  Taken 5/11/2025 0400 by Bessy Murry RN  Sensory Stimulation Regulation:   lighting decreased   quiet environment promoted  Sleep/Rest Enhancement:   noise level reduced   regular sleep/rest pattern promoted   room darkened  Medication Review/Management: medications reviewed  Taken 5/11/2025 0000 by Bessy Murry RN  Sensory Stimulation Regulation:   lighting decreased   quiet environment promoted  Sleep/Rest Enhancement:   noise level reduced   regular sleep/rest pattern promoted   room darkened  Medication Review/Management: medications reviewed  Taken 5/10/2025 2000 by Bessy Murry RN  Sensory Stimulation Regulation:   lighting decreased   quiet environment promoted  Sleep/Rest Enhancement:   noise level reduced   regular sleep/rest pattern promoted   room darkened  Medication Review/Management: medications reviewed     Problem: Sepsis/Septic Shock  Goal: Absence of Infection Signs and Symptoms  Outcome: Progressing  Intervention: Initiate Sepsis Management  Recent Flowsheet Documentation  Taken 5/11/2025 0400 by Bessy Murry RN  Infection Prevention:   hand hygiene promoted   rest/sleep promoted  Taken 5/11/2025 0000 by Bessy Murry RN  Infection Prevention:   hand hygiene promoted   rest/sleep promoted  Taken 5/10/2025 2000 by Bessy Murry  RN  Infection Prevention:   hand hygiene promoted   rest/sleep promoted  Intervention: Promote Recovery  Recent Flowsheet Documentation  Taken 5/11/2025 0400 by Bessy Murry RN  Sleep/Rest Enhancement:   noise level reduced   regular sleep/rest pattern promoted   room darkened  Activity Management: activity adjusted per tolerance  Taken 5/11/2025 0000 by Bessy Murry RN  Sleep/Rest Enhancement:   noise level reduced   regular sleep/rest pattern promoted   room darkened  Activity Management: activity adjusted per tolerance  Taken 5/10/2025 2000 by Bessy Murry RN  Sleep/Rest Enhancement:   noise level reduced   regular sleep/rest pattern promoted   room darkened  Activity Management: activity adjusted per tolerance     Problem: Gas Exchange Impaired  Goal: Optimal Gas Exchange  Outcome: Progressing  Intervention: Optimize Oxygenation and Ventilation  Recent Flowsheet Documentation  Taken 5/11/2025 0400 by Bessy Murry RN  Head of Bed (HOB) Positioning: HOB at 20-30 degrees  Taken 5/11/2025 0000 by Bessy Murry RN  Head of Bed (HOB) Positioning: HOB at 20-30 degrees  Taken 5/10/2025 2000 by Bessy Murry RN  Head of Bed (HOB) Positioning: HOB at 20-30 degrees     Problem: Heart Failure  Goal: Stable Heart Rate and Rhythm  Outcome: Progressing     Problem: Heart Failure  Goal: Fluid and Electrolyte Balance  Outcome: Progressing     Goal Outcome Evaluation:  Pt denies pain. O2 sats in the mid to upper 90s on 3L NC. Pt placed on bipap for majority of night and tolerated well. NSR. Lasix & dobutamine gtt running as ordered. Purewick in place with good urine output. Turned and repositioned as allowed. Afebrile. Sepsis protocol fired and ordered. BP soft but stable. A/O but tired and sleeping between cares. VSS. Will continue to monitor and notify MD of any changes.

## 2025-05-11 NOTE — PLAN OF CARE
Problem: Adult Inpatient Plan of Care  Goal: Optimal Comfort and Wellbeing  5/11/2025 1756 by Sunita Hester RN  Outcome: Progressing     Problem: Comorbidity Management  Goal: Maintenance of COPD Symptom Control  5/11/2025 1756 by Sunita Hester RN  Outcome: Progressing     Problem: Comorbidity Management  Goal: Blood Pressure in Desired Range  5/11/2025 1756 by Sunita Hester RN  Outcome: Progressing     Problem: Infection  Goal: Absence of Infection Signs and Symptoms  5/11/2025 1756 by Sunita Hester RN  Outcome: Progressing     Problem: Sepsis/Septic Shock  Goal: Absence of Infection Signs and Symptoms  5/11/2025 1756 by Sunita Hester RN  Outcome: Progressing     Problem: Gas Exchange Impaired  Goal: Optimal Gas Exchange  5/11/2025 1756 by Sunita Hester RN  Outcome: Progressing     Problem: Heart Failure  Goal: Fluid and Electrolyte Balance  5/11/2025 1756 by Sunita Hester RN  Outcome: Progressing     Problem: Heart Failure  Goal: Stable Heart Rate and Rhythm  5/11/2025 1756 by Sunita Hester RN  Outcome: Progressing      Pt A/Ox4. Denied pain and SOB. Remains on baseline 02 during the day at 3L NC. Repositioning in bed as pt allows, continues to intermittently refuse repositioning. Pt transitioned to a nasir bed/low air loss mattress to promote skin integrity. PO K given x1 per orders for K of 3.3, recheck 5/12 AM. Lasix gtt d/c'd this shift per cardiology. Dobutamine gtt decreased to 1.25mcg/kg/min per orders.  PT/OT evaluated pt this shift and determined TCU for discharge. Plan for CT PE check 5/12. Plan of care continues.

## 2025-05-11 NOTE — PROGRESS NOTES
"   05/11/25 1420   Appointment Info   Signing Clinician's Name / Credentials (PT) Thelma Peñaloza, PT, DPT   Living Environment   People in Home alone   Current Living Arrangements other (see comments)  ([facility])   Home Accessibility wheelchair accessible  (ramped entrance)   Living Environment Comments Pt has a walk-in shower with grab bars and a shower chair.   Self-Care   Equipment Currently Used at Home wheelchair, manual;walker, rolling   Fall history within last six months yes   Number of times patient has fallen within last six months 1   Activity/Exercise/Self-Care Comment Patient independent with ADLs. Pt has a  1x/week who assists with laundry. Pt's mother assists with cooking.   General Information   Onset of Illness/Injury or Date of Surgery 05/08/25   Referring Physician Michael Cabrera DO   Patient/Family Therapy Goals Statement (PT) None stated.   Pertinent History of Current Problem (include personal factors and/or comorbidities that impact the POC) Per H&P: \"49 year old female with a past medical history of Chronic Hypoxic Respiratory Failure (on 3L NC at home), Pulmonary HTN, Chronic CHF who was admitted on 5/8/25 after presenting to [facility] ED for Sepsis with Hypoxia.\"   Existing Precautions/Restrictions fall;oxygen therapy device and L/min  (3L O2 via nasal cannula)   Range of Motion (ROM)   Range of Motion ROM deficits secondary to weakness;ROM deficits secondary to swelling   Strength (Manual Muscle Testing)   Strength (Manual Muscle Testing) Deficits observed during functional mobility   Bed Mobility   Bed Mobility supine-sit;sit-supine   Supine-Sit [place] (Bed Mobility) moderate assist (50% patient effort);2 person assist   Sit-Supine [place] (Bed Mobility) moderate assist (50% patient effort);2 person assist   Bed Mobility Limitations decreased ability to use legs for bridging/pushing;decreased ability to use arms for pushing/pulling   Impairments Contributing to Impaired " Bed Mobility decreased strength   Assistive Device (Bed Mobility) draw sheet   Transfers   Transfers sit-stand transfer   Transfer Safety Concerns Noted decreased weight-shifting ability   Impairments Contributing to Impaired Transfers impaired balance;decreased strength   Sit-Stand Transfer   Sit-Stand [place] (Transfers) minimum assist (75% patient effort);verbal cues   Assistive Device (Sit-Stand Transfers) walker, front-wheeled   Gait/Stairs (Locomotion)   [place] Level (Gait) minimum assist (75% patient effort);verbal cues   Assistive Device (Gait) walker, front-wheeled   Distance in Feet (Gait) 2'   Pattern (Gait) step-to   Deviations/Abnormal Patterns (Gait) leela decreased;gait speed decreased   Comment, (Gait/Stairs) Side stepping along EOB.   Clinical Impression   Criteria for Skilled Therapeutic Intervention Yes, treatment indicated   PT Diagnosis (PT) impaired functional mobility   Influenced by the following impairments decreased strength, impaired balance, decreased activity tolerance   Functional limitations due to impairments transfers, ambulation   Clinical Presentation (PT Evaluation Complexity) evolving   Clinical Presentation Rationale Clinical judgment.   Clinical Decision Making (Complexity) moderate complexity   Planned Therapy Interventions (PT) bed mobility training;balance training;gait training;home exercise program;neuromuscular re-education;patient/family education;ROM (range of motion);strengthening;transfer training;wheelchair management/propulsion training   Risk & Benefits of therapy have been explained evaluation/treatment results reviewed;participants voiced agreement with care plan;participants included;patient   PT Total Evaluation Time   PT Eval, Moderate Complexity Minutes (74326) 10   Physical Therapy Goals   PT Frequency 5x/week   PT Predicted Duration/Target Date for Goal Attainment 05/18/25   PT Goals Transfers;Gait;Wheelchair Mobility   PT: Transfers Minimal  assist;Sit to/from stand;Assistive device   PT: Gait Minimal assist;Assistive device;Rolling walker;25 feet   PT: Wheelchair Mobility 50 feet;Caregiver Min assist;manual wheelchair   Interventions   Interventions Quick Adds Therapeutic Activity   Therapeutic Activity   Therapeutic Activities: dynamic activities to improve functional performance Minutes (35421) 15   Symptoms Noted During/After Treatment Fatigue;Shortness of breath   Treatment Detail/Skilled Intervention Patient prefers to direct cares. Pt transfers sit <> stand x2 reps from EOB with FWW, min assist of 2. Pt notes incontinence on first repetition, stands x 1 additional rep for cares. Once patient returned to supine, patient rolls x 1 with mod assist for nursing assistant to complete cares. Pt notes increased pain in L hip with mobility. Pt supine in bed at end of session, call light in reach, bed alarm engaged.   PT Discharge Planning   PT Plan progress mobility as able   PT Discharge Recommendation (DC Rec) Transitional Care Facility   PT Rationale for DC Rec Patient requiring assist of 1-2 for transfers and bed mobility. Only tolerating a few steps at EOB and fatigues quickly. Recommend TCU at discharge.   PT Brief overview of current status Supine <> sit, mod assist of 2. Sit <> stand, min assist of 2.   PT Total Distance Amb During Session (feet) 2   PT Equipment Needed at Discharge wheelchair;walker, rolling   Physical Therapy Time and Intention   Timed Code Treatment Minutes 15   Total Session Time (sum of timed and untimed services) 25

## 2025-05-11 NOTE — PROGRESS NOTES
[facility] Inpatient follow up       Patient:  Bess Enamorado  Date of birth 1975, Medical record number [id number]  Date of Visit:  05/11/2025  Attending Physician: Michael Cabrera DO         Assessment and Recommendations:   Assessment:  Bess Enamorado is a 49 year old female with   Super morbid obesity with BMI of 51 and hypoventilation syndrome.  Chronic respiratory failure, oxygen dependent on 3 L nasal cannula at home.  History of Staphylococcus lugdunensis bacteremia in March 2024.  Bilateral lower extremity lymphedema  Recently history of thoracoabdominal shingles.  Diagnosed on 4/15/2025.  No active lesion.  Admitted with sepsis with temperature of 101.7 and mild leukocytosis.  Initially treated with azithromycin plus vancomycin plus Zosyn.  Now on Zosyn monotherapy.  Pseudomonas bacteremia.  Source is left lower extremity cellulitis.  Positive blood culture on 5/8/2025.  Left lower extremity cellulitis.  Improving.  Candida intertrigo.  Improving.  Reported history of C. difficile in the past.  I am unable to see the positive test on chart review.  Report having needed prophylactic vancomycin with antibiotic exposure in the past.    Recommendations:  Continue IV Zosyn.  Treat stercoral colitis.  Monitor oxygen need.  Lymphedema care  Continue IV micafungin for 72 hours.  Has prolonged QTc and as a result I am avoiding fluconazole.  Start p.o. vancomycin 125 mg daily.  To be continued while the patient is on antibiotic for Pseudomonas bacteremia.    Discussed with the patient, nursing staff.    ID will follow.      Thong Smith MD.  [facility].   [facility]  Office Telephone [phone].  Fax [phone]  Formerly [facility] paging            Interval History:     HPI:  The interval history was reviewed.   Feeling much better today.  Pseudomonas is confirmed on blood culture.  Susceptible to Zosyn.  Her left leg pain is  improving.  Prior history of C. difficile although I am unable to see a positive test on chart review.  She reports having needed oral vancomycin for prophylaxis in the past.    Pertinent cultures include:  Culture Micro   Date Value Ref Range Status   03/04/2021 50,000 to 100,000 colonies/mL  Proteus mirabilis   (A)  Final   03/04/2021 (A)  Final    10,000 to 50,000 colonies/mL  Strain 2  Proteus mirabilis     03/04/2021 (A)  Final    Cultured on the 1st day of incubation:  Moraxella nonliquefaciens  Susceptibility testing done on previous specimen     03/04/2021   Final    Critical Value/Significant Value, preliminary result only, called to and read back by  Joceline Sierra RN 03/05/21 @2108 CARLOS     03/04/2021 (A)  Final    Cultured on the 2nd day of incubation:  Parvimonas micra     03/04/2021   Final    Critical Value/Significant Value, preliminary result only, called to and read back by  GILES ALBARADO RN 3.6.21 AT 1344. JL     03/04/2021 (A)  Final    Cultured on the 1st day of incubation:  Staphylococcus epidermidis     03/04/2021   Final    Critical Value/Significant Value called to and read back by  Ariel Robbins RN on 3.6.2021 at 1421. KVO     03/04/2021   Final    (Note)  NEGATIVE for the following: Staphylococcus spp., Staph aureus, Staph  epidermidis, Staph lugdunensis, Streptococcus spp., Strep pneumoniae,  Strep pyogenes, Strep agalactiae, Strep anginosus group, Enterococcus  faecalis, Enterococcus faecium, and Listeria spp. by [employer]  multiplex nucleic acid test. Final identification and antimicrobial  susceptibility testing will be verified by standard methods.    Critical Value/Significant Value called to and read back by Ariel Robbins RN 1627 03.06.2021 NM         Recent Inflammatory Biomarkers:   Recent Labs   Lab Test 05/11/25  0444 05/10/25  0529 05/09/25  0522 05/08/25  1148 04/03/25  0657 04/02/25  1627 03/23/25  0713 03/23/25  0027 02/17/25  0753 02/17/25  0752 02/13/25  0717 02/12/25  0720  25  1348 22  0510 22  0454 22  0502 02/10/22  0534 02/10/22  0533 22  0457 22  1620 22  1115 21  0600 21  0611   CRP  --   --   --   --   --   --   --   --   --   --   --   --   --   --  8.5* 13.1* 20.0*  --  21.8*  --  7.9*  --  144.0*   PCAL  --   --   --   --  0.33 0.22  --  0.27  --  0.14  --  0.65* 0.73*   < >  --   --   --   --   --    < >  --   --   --    WBC 7.2 9.0 11.2* 6.2 10.4 13.6*   < >  --    < >  --    < > 7.2 7.3   < >  --  5.9  --    < > 10.0   < > 15.3*   < > 7.3    < > = values in this interval not displayed.            Review of Systems:   CONSTITUTIONAL:    Temp Max: Temp (24hrs), Av.5  F (36.9  C), Min:97.6  F (36.4  C), Max:99.6  F (37.6  C)   .  Negative except for findings in the HPI.           Current Medications (antimicrobials listed in bold):     Current Facility-Administered Medications   Medication Dose Route Frequency Provider Last Rate Last Admin    apixaban ANTICOAGULANT (ELIQUIS) tablet 5 mg  5 mg Oral BID Cody Marcelo MD   5 mg at 25 1001    aspirin EC tablet 81 mg  81 mg Oral Cody Nicolas MD   81 mg at 25 1001    [Held by provider] empagliflozin (JARDIANCE) tablet 10 mg  10 mg Oral Cody Nicolas MD        escitalopram (LEXAPRO) tablet 10 mg  10 mg Oral Cody Nicolas MD   10 mg at 25 1001    furosemide (LASIX) injection 40 mg  40 mg Intravenous TID Bob Herman MD        micafungin (MYCAMINE) 150 mg in sodium chloride 0.9 % 100 mL intermittent infusion  150 mg Intravenous Q24H Thong Smith  mL/hr at 05/10/25 1519 150 mg at 05/10/25 1519    miconazole (MICATIN) 2 % powder   Topical BID Michael Cabrera DO   Given at 25 1002    piperacillin-tazobactam (ZOSYN) 4.5 g vial to attach to  mL bag  4.5 g Intravenous Q6H Cody Marcelo MD   4.5 g at 25 1116    simvastatin (ZOCOR) tablet 10 mg  10 mg Oral At Bedtime Cody Marcelo MD   10  mg at 05/10/25 2031    sodium chloride (PF) 0.9% PF flush 3 mL  3 mL Intracatheter Q8H Leslie Givens MD   3 mL at 05/11/25 1116    sodium chloride (PF) 0.9% PF flush 3 mL  3 mL Intracatheter Q8H Cody Marcelo MD   3 mL at 05/10/25 2331              Allergies:   No Known Allergies         Physical Exam:   Vitals were reviewed  Patient Vitals for the past 24 hrs:   BP Temp Temp src Pulse Resp SpO2 Weight   05/11/25 1119 94/56 98.1  F (36.7  C) Oral 89 22 98 % --   05/11/25 0958 103/61 -- -- 92 -- 98 % --   05/11/25 0745 -- -- -- -- -- 93 % --   05/11/25 0725 102/63 99  F (37.2  C) Axillary 87 24 96 % --   05/11/25 0344 100/54 99.6  F (37.6  C) Axillary 96 24 100 % 130.2 kg (287 lb 0.6 oz)   05/10/25 2300 100/62 99.1  F (37.3  C) Oral 87 24 -- --   05/10/25 2059 -- -- -- 87 -- 99 % --   05/10/25 1908 97/56 97.8  F (36.6  C) Oral 91 20 97 % --   05/10/25 1518 103/56 97.6  F (36.4  C) Oral 85 18 99 % --   05/10/25 1500 102/57 -- -- 83 -- -- --   05/10/25 1445 109/59 -- -- 82 -- -- --   05/10/25 1429 104/60 -- -- -- -- -- --   05/10/25 1400 -- -- -- 82 -- 100 % --       Physical Examination:  Gen: Pleasant in no acute distress.  HEENT: NCAT. EOMI. PERRL.  Neck: No bruit, JVD or thyromegaly.  Lungs: Clear to ascultation bilat with no crackles or wheezes.  Card: RRR. NSR. No RMG. Peripheral pulses present and symmetric. No edema.  Abd: Soft NT ND. No mass. Normal bowel sounds.  Skin: No rash.  Extr: Improved left lower extremity cellulitis.  Bilateral stasis dermatitis from lymphedema.  Neuro: Alert and oriented to place time and person. Cranial nerves II to XII intact.        Laboratory Data:   ID Labs:  Microbiology labs:   Contains critical data Blood Culture Peripheral blood (BC) Arm, Left  Order: [id number]   Collected 5/8/2025 12:01 PM       Status: Final result    Test Result Released: Yes (not seen)    Specimen Information: Arm, Left; Peripheral blood (BC)   0 Result Notes  Culture Positive on the 1st day  of incubation Abnormal    Pseudomonas aeruginosa Panic    1 of 2 bottles        Resulting Agency: [facility]     Susceptibility     Pseudomonas aeruginosa     PHILIP     Cefepime 2 ug/mL Susceptible     Ceftazidime 2 ug/mL Susceptible     Ciprofloxacin 0.12 ug/mL Susceptible     Levofloxacin 0.5 ug/mL Susceptible     Meropenem <=0.25 ug/mL Susceptible     Piperacillin/Tazobactam 8 ug/mL Susceptible                 Specimen Collected: 05/08/25 12:01 PM Last Resulted: 05/11/25  7:34 AM     Recent Labs   Lab Test 02/12/22  0454 02/11/22  0502 02/10/22  0534 02/09/22  0457 02/08/22  1115 03/06/21  0611   CRP 8.5* 13.1* 20.0* 21.8* 7.9* 144.0*     Recent Labs   Lab Test 05/11/25  0444 05/10/25  0529 05/09/25  0522 05/08/25  1148 04/03/25  0657 04/02/25  1627   WBC 7.2 9.0 11.2* 6.2 10.4 13.6*     Recent Labs   Lab Test 05/11/25 0444 05/10/25  0529 05/09/25  0522 05/08/25  1148   CR 0.80 0.97* 1.09* 0.83   GFRESTIMATED 89 71 62 85       Hematology Studies  Recent Labs   Lab Test 05/11/25  0444 05/10/25  0529 05/09/25  0522 05/08/25  1148 04/03/25  0657 04/02/25  1627 03/27/25  0859   WBC 7.2 9.0 11.2* 6.2 10.4 13.6* 4.0   ANEU 5.5 7.1  --  5.1 8.7* 12.1* 2.4   AEOS 0.3 0.1  --  0.1 0.4 0.3 0.2   HGB 12.1 12.1 12.8 14.4 13.4 14.4 14.3   HCT 38.4 36.4 39.4 45.6 42.0 44.5 43.2    160 187 205 170 207 173       Metabolic  Recent Labs   Lab Test 05/11/25  0444 05/10/25  0529 05/09/25  0522    137 135   BUN 15.7 21.5* 25.3*   CO2 24 26 27   CR 0.80 0.97* 1.09*   GFRESTIMATED 89 71 62       Hepatic Studies  Recent Labs   Lab Test 05/08/25  1148 04/02/25  1627 03/24/25  0705   BILITOTAL 2.3* 3.0* 2.8*   ALKPHOS 78 74 72   ALBUMIN 4.0 3.6 3.1*   AST 15 20 18   ALT 8 11 9       ImmunologlobulinsNo lab results found.         Imaging Data:   Reviewed

## 2025-05-11 NOTE — PROGRESS NOTES
"[facility]    Medicine Progress Note - Hospitalist Service    Date of Admission:  5/8/2025    Assessment & Plan   49-year-old female with pulmonary HTN, chronic respiratory failure and recent DVT presented with shortness of breath and weakness and found to be septic and hypoxic.    #Severe sepsis with endorgan dysfunction involving lactic acidosis, worsened respiratory failure  #Gram-negative bacteremia  #Cellulitis, LLE  #Recent shingles, L flank  #Candida intertrigo  Blood culture grew pan-sensitive Pseudomonas  MRSA screen negative  S/p IV fluid hydration 2.5L  CT showed no abscess  ID consulted- continue zosyn and added micafungin x72hr    #Acute on chronic HFpEF  #Pulmonary hypertension  #Acute on chronic respiratory failure with hypoxia and hypercapnia  S/p 10 L oximask, BiPAP, now weaned to nasal cannula  Cardiology consulted: lasix gtt switched to IV Q8, continued dobutamine  Holding PTA spironolactone, Bumex    #Recent DVT, LLE  #Chronic lymphedema  PTA Eliquis  Lymph team consulted    #Prediabetes  #Hypoglycemia, resolved  Stopped SSI and Accu-Cheks    #Morbid obesity  BMI 50      VTE PPx: DOAC          Diet: Low Saturated Fat Na <2400 mg    Brar Catheter: Not present  Lines: None     Cardiac Monitoring: ACTIVE order. Indication: Acute decompensated heart failure (48 hours)  Code Status: Full Code      Clinically Significant Risk Factors        # Hypokalemia: Lowest K = 3.2 mmol/L in last 2 days, will replace as needed            # Hypertension: Noted on problem list  # Acute heart failure with preserved ejection fraction: heart failure noted on problem list, last echo with EF >50%, and receiving IV diuretics           # Morbid Obesity: Estimated body mass index is 49.27 kg/m  as calculated from the following:    Height as of this encounter: 1.626 m (5' 4\").    Weight as of this encounter: 130.2 kg (287 lb 0.6 oz)., PRESENT ON ADMISSION     # Financial/Environmental Concerns: none "         Social Drivers of Health    Food Insecurity: Low Risk  (5/8/2025)    Food Insecurity     Within the past 12 months, did you worry that your food would run out before you got money to buy more?: No     Within the past 12 months, did the food you bought just not last and you didn t have money to get more?: No   Recent Concern: Food Insecurity - High Risk (3/23/2025)    Food Insecurity     Within the past 12 months, did you worry that your food would run out before you got money to buy more?: Yes     Within the past 12 months, did the food you bought just not last and you didn t have money to get more?: No   Housing Stability: High Risk (5/8/2025)    Housing Stability     Do you have housing? : No     Are you worried about losing your housing?: No   Transportation Needs: High Risk (5/8/2025)    Transportation Needs     Within the past 12 months, has lack of transportation kept you from medical appointments, getting your medicines, non-medical meetings or appointments, work, or from getting things that you need?: Yes   Physical Activity: Inactive (3/21/2024)    Exercise Vital Sign     Days of Exercise per Week: 0 days     Minutes of Exercise per Session: 0 min   Interpersonal Safety: Low Risk  (5/8/2025)    Interpersonal Safety     Do you feel physically and emotionally safe where you currently live?: Yes     Within the past 12 months, have you been hit, slapped, kicked or otherwise physically hurt by someone?: No     Within the past 12 months, have you been humiliated or emotionally abused in other ways by your partner or ex-partner?: No   Recent Concern: Interpersonal Safety - High Risk (3/23/2025)    Interpersonal Safety     Do you feel physically and emotionally safe where you currently live?: No     Within the past 12 months, have you been hit, slapped, kicked or otherwise physically hurt by someone?: No     Within the past 12 months, have you been humiliated or emotionally abused in other ways by your  partner or ex-partner?: No   Social Connections: Unknown (3/1/2022)    Social Connection and Isolation Panel [NHANES]     Frequency of Communication with Friends and Family: Twice a week     Frequency of Social Gatherings with Friends and Family: Twice a week          Disposition Plan     Medically Ready for Discharge: Anticipated in 2-4 Days             Michael Cabrera DO  Hospitalist Service  [facility]  Securely message with [employer] (more info)  Text page via [employer] Paging/Directory   ______________________________________________________________________    Interval History   No acute events overnight    Physical Exam   Vital Signs: Temp: 99  F (37.2  C) Temp src: Axillary BP: 103/61 Pulse: 92   Resp: 24 SpO2: 98 % O2 Device: Nasal cannula Oxygen Delivery: 3 LPM  Weight: 287 lbs .62 oz    General Appearance:  No acute distress  Respiratory: Nonlabored breathing  Cardiovascular: Regular rate and rhythm  Extremities: No peripheral edema or cyanosis    Medical Decision Making       50 MINUTES SPENT BY ME on the date of service doing chart review, history, exam, documentation & further activities per the note.      Data

## 2025-05-11 NOTE — PROGRESS NOTES
"Imp/plan:  Pulmonary HTN - severe in setting of infection on dobutamine and furosemide infusion, Cr 0.8, K 3.3, would plan VQ scan tomorrow and right and left heart cath with vasodilator on Tuesday given critical nature of pulm HTN.  Will lower dobutamine 1.25mg/min and lower fuosemide to 40mg tid    Review of Systems: 12 points negative other than above    /61   Pulse 92   Temp 99  F (37.2  C) (Axillary)   Resp 24   Ht 1.626 m (5' 4\")   Wt 130.2 kg (287 lb 0.6 oz)   LMP 04/08/2025 (Within Days)   SpO2 98%   BMI 49.27 kg/m    JVP<7cm, carotids normal  Lungs clear  Cor RRR no c,r,m  Abs soft +BS, no mass  Ext no c,c,e    Lab Results   Component Value Date    HGB 12.1 05/11/2025     Lab Results   Component Value Date     05/11/2025     No results found for: \"CREATININE\"  No components found for: \"K\"          Bob Herman MD  Interventional Cardiology   [facility]  [phone]    "

## 2025-05-11 NOTE — PROGRESS NOTES
"   05/11/25 1421   Appointment Info   Signing Clinician's Name / Credentials (OT) Rajani Melvi, OTR/L   Living Environment   People in Home alone   Current Living Arrangements other (see comments)  ([facility])   Home Accessibility wheelchair accessible   Living Environment Comments Pt has a walk-in shower with grab bars and a shower chair.   Self-Care   Equipment Currently Used at Home wheelchair, manual;walker, rolling   Fall history within last six months yes   Number of times patient has fallen within last six months 1   Activity/Exercise/Self-Care Comment Patient independent with ADLs. pt mainly uses the W/C, walks short distances in the home with the walker.   Instrumental Activities of Daily Living (IADL)   IADL Comments Pt has a  1x/week who assists with laundry. Pt's mother assists with cooking.   General Information   Onset of Illness/Injury or Date of Surgery 05/08/25   Referring Physician Michael Cabrera,    Patient/Family Therapy Goal Statement (OT) open to TCU if needed   Additional Occupational Profile Info/Pertinent History of Current Problem Per chart review, pt \"is 49-year-old female with pulmonary HTN, chronic respiratory failure and recent DVT presented with shortness of breath and weakness and found to be septic and hypoxic.\"   Existing Precautions/Restrictions fall;oxygen therapy device and L/min  (3 LPM via NC)   Cognitive Status Examination   Orientation Status orientation to person, place and time  (A&Ox4)   Pain Assessment   Patient Currently in Pain Yes, see Vital Sign flowsheet  (pain mostly in LLE and low back)   Range of Motion Comprehensive   General Range of Motion no range of motion deficits identified   Strength Comprehensive (MMT)   Comment, General Manual Muscle Testing (MMT) Assessment generalized weakness   Bed Mobility   Bed Mobility supine-sit;sit-supine   Supine-Sit [place] (Bed Mobility) moderate assist (50% patient effort);2 person assist   Sit-Supine " [place] (Bed Mobility) maximum assist (25% patient effort);2 person assist   Assistive Device (Bed Mobility) bed rails   Comment (Bed Mobility) extended time   Transfers   Transfers sit-stand transfer;toilet transfer   Sit-Stand Transfer   Sit-Stand [place] (Transfers) minimum assist (75% patient effort);2 person assist   Assistive Device (Sit-Stand Transfers) walker, front-wheeled   Toilet Transfer   [place] Level (Toilet Transfer) not tested   Toilet Transfer Comments Pt unable to transfer onto commode   Balance   Balance Comments SBA unsupported sitting balance. CGA-Min A standing balance   Activities of Daily Living   BADL Assessment/Intervention lower body dressing;toileting   Lower Body Dressing Assessment/Training   [place] Level (Lower Body Dressing) dependent (less than 25% patient effort)   Toileting   [place] Level (Toileting) dependent (less than 25% patient effort)   Clinical Impression   Criteria for Skilled Therapeutic Interventions Met (OT) Yes, treatment indicated   OT Diagnosis Impaired ability to perform ADLs, IADLs, and functional mobility.   Influenced by the following impairments severe sepsis   OT Problem List-Impairments impacting ADL problems related to;activity tolerance impaired;balance;mobility;strength;pain;postural control   Assessment of Occupational Performance 1-3 Performance Deficits   Identified Performance Deficits toileting, grooming/hygiene, lower body dressing   Planned Therapy Interventions (OT) ADL retraining;balance training;bed mobility training;strengthening;transfer training;home program guidelines;progressive activity/exercise;risk factor education   Clinical Decision Making Complexity (OT) problem focused assessment/low complexity   Risk & Benefits of therapy have been explained evaluation/treatment results reviewed;care plan/treatment goals reviewed;risks/benefits reviewed;current/potential barriers reviewed;participants voiced agreement with  care plan;participants included;patient   OT Total Evaluation Time   OT Eval, Low Complexity Minutes (09372) 10   OT Goals   Therapy Frequency (OT) 5 times/week   OT Predicted Duration/Target Date for Goal Attainment 05/18/25   OT Goals Lower Body Dressing;Toilet Transfer/Toileting;Hygiene/Grooming   OT: Hygiene/Grooming minimal assist;while standing   OT: Lower Body Dressing Moderate assist;using adaptive equipment   OT: Toilet Transfer/Toileting Supervision/stand-by assist;toilet transfer;cleaning and garment management;using adaptive equipment  (using commode)   Self-Care/Home Management   Self-Care/Home Mgmt/ADL, Compensatory, Meal Prep Minutes (50233) 23   Symptoms Noted During/After Treatment (Meal Preparation/Planning Training) fatigue;increased pain   Treatment Detail/Skilled Intervention Increased time spent working to problem-solve toilet transfer - pt declined use of available commode d/t elevated height. Pt incontinent of loose stool in standing. Bedpan placed, pt utilized bed pan seated EOB x2 trials with CGA for safety. Pt performed additional x2 STS from EOB, Mod A for 1 trial and Min Ax2 for 2nd trial. Pt required Mod Ax2 for rolling in L and R in bed for bed-based pericares. Pt left in bed at end of session with direct handoff to 1:1.   OT Discharge Planning   OT Plan EOB ADLs, transfers, toileting on commode   OT Discharge Recommendation (DC Rec) Transitional Care Facility   OT Rationale for DC Rec Pt lives alone and currently requires Ax2 for ADLs and transfers.   OT Brief overview of current status Mod-Max Ax2 bed mobility, dependent bed-based toileting, dependent lower body dressing   Total Session Time   Timed Code Treatment Minutes 23   Total Session Time (sum of timed and untimed services) 33

## 2025-05-11 NOTE — PROGRESS NOTES
Heart Failure Care Map  GOALS TO BE MET BEFORE DISCHARGE:    1. Decrease congestion and/or edema with diuretic therapy to achieve near optimal volume status.     Dyspnea improved: Yes, satisfactory for discharge.   Edema improved: No, further care required to meet this goal. Please explain BLE edema.        Last 24 hour I/O:   Intake/Output Summary (Last 24 hours) at 5/11/2025 1855  Last data filed at 5/11/2025 1735  Gross per 24 hour   Intake 1476.04 ml   Output 3050 ml   Net -1573.96 ml           Net I/O and Weights since admission:   04/11 2300 - 05/11 2259  In: 3497.46 [P.O.:2970; I.V.:527.46]  Out: 7625 [Urine:7625]  Net: -4127.54     Vitals:    05/09/25 0825 05/11/25 0344   Weight: 134.2 kg (295 lb 13.7 oz) 130.2 kg (287 lb 0.6 oz)       2.  O2 sats > 90% on room air, or at prior home O2 therapy level.      Able to wean O2 this shift to keep sats above 90%?: Yes, satisfactory for discharge.   Does patient use Home O2? Yes-  3L NC          Current oxygenation status:   SpO2: 95 %     O2 Device: Nasal cannula, Oxygen Delivery: 3 LPM    3.  Tolerates ambulation and mobility near baseline.     Ambulation: No, further care required to meet this goal. Please explain Bed bound   Times patient ambulated with staff this shift: 0    Please review the Heart Failure Care Map for additional HF goal outcomes.    Sunita Hester RN  5/11/2025

## 2025-05-12 ENCOUNTER — APPOINTMENT (OUTPATIENT)
Dept: CT IMAGING | Facility: HOSPITAL | Age: 50
End: 2025-05-12
Attending: INTERNAL MEDICINE
Payer: COMMERCIAL

## 2025-05-12 ENCOUNTER — APPOINTMENT (OUTPATIENT)
Dept: PHYSICAL THERAPY | Facility: HOSPITAL | Age: 50
End: 2025-05-12
Payer: COMMERCIAL

## 2025-05-12 ENCOUNTER — APPOINTMENT (OUTPATIENT)
Dept: OCCUPATIONAL THERAPY | Facility: HOSPITAL | Age: 50
End: 2025-05-12
Payer: COMMERCIAL

## 2025-05-12 LAB
ANION GAP SERPL CALCULATED.3IONS-SCNC: 9 MMOL/L (ref 7–15)
BASOPHILS # BLD AUTO: 0.1 10E3/UL (ref 0–0.2)
BASOPHILS NFR BLD AUTO: 1 %
BUN SERPL-MCNC: 13.9 MG/DL (ref 6–20)
CALCIUM SERPL-MCNC: 9.7 MG/DL (ref 8.8–10.4)
CHLORIDE SERPL-SCNC: 103 MMOL/L (ref 98–107)
CREAT SERPL-MCNC: 0.8 MG/DL (ref 0.51–0.95)
EGFRCR SERPLBLD CKD-EPI 2021: 89 ML/MIN/1.73M2
EOSINOPHIL # BLD AUTO: 0.3 10E3/UL (ref 0–0.7)
EOSINOPHIL NFR BLD AUTO: 5 %
ERYTHROCYTE [DISTWIDTH] IN BLOOD BY AUTOMATED COUNT: 16.4 % (ref 10–15)
GLUCOSE SERPL-MCNC: 88 MG/DL (ref 70–99)
HCO3 SERPL-SCNC: 24 MMOL/L (ref 22–29)
HCT VFR BLD AUTO: 37.7 % (ref 35–47)
HGB BLD-MCNC: 12.2 G/DL (ref 11.7–15.7)
IMM GRANULOCYTES # BLD: 0 10E3/UL
IMM GRANULOCYTES NFR BLD: 0 %
LYMPHOCYTES # BLD AUTO: 1.2 10E3/UL (ref 0.8–5.3)
LYMPHOCYTES NFR BLD AUTO: 21 %
MCH RBC QN AUTO: 29.2 PG (ref 26.5–33)
MCHC RBC AUTO-ENTMCNC: 32.4 G/DL (ref 31.5–36.5)
MCV RBC AUTO: 90 FL (ref 78–100)
MONOCYTES # BLD AUTO: 0.5 10E3/UL (ref 0–1.3)
MONOCYTES NFR BLD AUTO: 10 %
NEUTROPHILS # BLD AUTO: 3.5 10E3/UL (ref 1.6–8.3)
NEUTROPHILS NFR BLD AUTO: 63 %
NRBC # BLD AUTO: 0 10E3/UL
NRBC BLD AUTO-RTO: 0 /100
PLATELET # BLD AUTO: 181 10E3/UL (ref 150–450)
POTASSIUM SERPL-SCNC: 3.3 MMOL/L (ref 3.4–5.3)
RBC # BLD AUTO: 4.18 10E6/UL (ref 3.8–5.2)
SODIUM SERPL-SCNC: 136 MMOL/L (ref 135–145)
WBC # BLD AUTO: 5.6 10E3/UL (ref 4–11)

## 2025-05-12 PROCEDURE — 85014 HEMATOCRIT: CPT | Performed by: HOSPITALIST

## 2025-05-12 PROCEDURE — 250N000011 HC RX IP 250 OP 636: Mod: JZ | Performed by: STUDENT IN AN ORGANIZED HEALTH CARE EDUCATION/TRAINING PROGRAM

## 2025-05-12 PROCEDURE — 250N000013 HC RX MED GY IP 250 OP 250 PS 637: Performed by: STUDENT IN AN ORGANIZED HEALTH CARE EDUCATION/TRAINING PROGRAM

## 2025-05-12 PROCEDURE — 250N000011 HC RX IP 250 OP 636: Performed by: STUDENT IN AN ORGANIZED HEALTH CARE EDUCATION/TRAINING PROGRAM

## 2025-05-12 PROCEDURE — 97535 SELF CARE MNGMENT TRAINING: CPT | Mod: GO

## 2025-05-12 PROCEDURE — 36415 COLL VENOUS BLD VENIPUNCTURE: CPT | Performed by: HOSPITALIST

## 2025-05-12 PROCEDURE — 250N000013 HC RX MED GY IP 250 OP 250 PS 637: Performed by: HOSPITALIST

## 2025-05-12 PROCEDURE — 97530 THERAPEUTIC ACTIVITIES: CPT | Mod: GP

## 2025-05-12 PROCEDURE — 99291 CRITICAL CARE FIRST HOUR: CPT | Performed by: INTERNAL MEDICINE

## 2025-05-12 PROCEDURE — 250N000011 HC RX IP 250 OP 636: Performed by: INTERNAL MEDICINE

## 2025-05-12 PROCEDURE — 99233 SBSQ HOSP IP/OBS HIGH 50: CPT | Performed by: INTERNAL MEDICINE

## 2025-05-12 PROCEDURE — 999N000157 HC STATISTIC RCP TIME EA 10 MIN

## 2025-05-12 PROCEDURE — 120N000004 HC R&B MS OVERFLOW

## 2025-05-12 PROCEDURE — 99233 SBSQ HOSP IP/OBS HIGH 50: CPT | Performed by: HOSPITALIST

## 2025-05-12 PROCEDURE — 258N000003 HC RX IP 258 OP 636: Performed by: STUDENT IN AN ORGANIZED HEALTH CARE EDUCATION/TRAINING PROGRAM

## 2025-05-12 PROCEDURE — 71275 CT ANGIOGRAPHY CHEST: CPT

## 2025-05-12 PROCEDURE — 94660 CPAP INITIATION&MGMT: CPT

## 2025-05-12 PROCEDURE — 250N000013 HC RX MED GY IP 250 OP 250 PS 637: Performed by: INTERNAL MEDICINE

## 2025-05-12 PROCEDURE — P9047 ALBUMIN (HUMAN), 25%, 50ML: HCPCS | Performed by: INTERNAL MEDICINE

## 2025-05-12 PROCEDURE — 82374 ASSAY BLOOD CARBON DIOXIDE: CPT | Performed by: HOSPITALIST

## 2025-05-12 RX ORDER — FUROSEMIDE 10 MG/ML
80 INJECTION INTRAMUSCULAR; INTRAVENOUS 3 TIMES DAILY
Status: DISCONTINUED | OUTPATIENT
Start: 2025-05-12 | End: 2025-05-15

## 2025-05-12 RX ORDER — FUROSEMIDE 10 MG/ML
80 INJECTION INTRAMUSCULAR; INTRAVENOUS ONCE
Status: COMPLETED | OUTPATIENT
Start: 2025-05-12 | End: 2025-05-12

## 2025-05-12 RX ORDER — ALBUMIN (HUMAN) 12.5 G/50ML
50 SOLUTION INTRAVENOUS ONCE
Status: COMPLETED | OUTPATIENT
Start: 2025-05-12 | End: 2025-05-12

## 2025-05-12 RX ORDER — LEVOFLOXACIN 750 MG/1
750 TABLET, FILM COATED ORAL EVERY 24 HOURS
Status: DISCONTINUED | OUTPATIENT
Start: 2025-05-12 | End: 2025-05-13

## 2025-05-12 RX ORDER — POTASSIUM CHLORIDE 1500 MG/1
60 TABLET, EXTENDED RELEASE ORAL ONCE
Status: COMPLETED | OUTPATIENT
Start: 2025-05-12 | End: 2025-05-12

## 2025-05-12 RX ORDER — IOPAMIDOL 755 MG/ML
90 INJECTION, SOLUTION INTRAVASCULAR ONCE
Status: COMPLETED | OUTPATIENT
Start: 2025-05-12 | End: 2025-05-12

## 2025-05-12 RX ADMIN — PIPERACILLIN AND TAZOBACTAM 4.5 G: 4; .5 INJECTION, POWDER, FOR SOLUTION INTRAVENOUS at 22:10

## 2025-05-12 RX ADMIN — ALBUMIN HUMAN 50 G: 0.25 SOLUTION INTRAVENOUS at 10:31

## 2025-05-12 RX ADMIN — POTASSIUM CHLORIDE 60 MEQ: 1500 TABLET, EXTENDED RELEASE ORAL at 08:13

## 2025-05-12 RX ADMIN — APIXABAN 5 MG: 5 TABLET, FILM COATED ORAL at 08:14

## 2025-05-12 RX ADMIN — FUROSEMIDE 80 MG: 10 INJECTION, SOLUTION INTRAMUSCULAR; INTRAVENOUS at 20:02

## 2025-05-12 RX ADMIN — PIPERACILLIN AND TAZOBACTAM 4.5 G: 4; .5 INJECTION, POWDER, FOR SOLUTION INTRAVENOUS at 10:45

## 2025-05-12 RX ADMIN — APIXABAN 5 MG: 5 TABLET, FILM COATED ORAL at 20:02

## 2025-05-12 RX ADMIN — VANCOMYCIN HYDROCHLORIDE 125 MG: 125 CAPSULE ORAL at 08:14

## 2025-05-12 RX ADMIN — PIPERACILLIN AND TAZOBACTAM 4.5 G: 4; .5 INJECTION, POWDER, FOR SOLUTION INTRAVENOUS at 04:35

## 2025-05-12 RX ADMIN — MICONAZOLE NITRATE: 20 POWDER TOPICAL at 10:33

## 2025-05-12 RX ADMIN — PIPERACILLIN AND TAZOBACTAM 4.5 G: 4; .5 INJECTION, POWDER, FOR SOLUTION INTRAVENOUS at 16:43

## 2025-05-12 RX ADMIN — MICAFUNGIN SODIUM 150 MG: 50 INJECTION, POWDER, LYOPHILIZED, FOR SOLUTION INTRAVENOUS at 13:51

## 2025-05-12 RX ADMIN — FUROSEMIDE 80 MG: 10 INJECTION, SOLUTION INTRAMUSCULAR; INTRAVENOUS at 11:03

## 2025-05-12 RX ADMIN — MICONAZOLE NITRATE: 20 POWDER TOPICAL at 20:06

## 2025-05-12 RX ADMIN — IOPAMIDOL 90 ML: 755 INJECTION, SOLUTION INTRAVENOUS at 08:43

## 2025-05-12 RX ADMIN — LEVOFLOXACIN 750 MG: 750 TABLET, FILM COATED ORAL at 14:49

## 2025-05-12 RX ADMIN — SIMVASTATIN 10 MG: 10 TABLET, FILM COATED ORAL at 20:02

## 2025-05-12 RX ADMIN — ASPIRIN 81 MG: 81 TABLET, COATED ORAL at 08:14

## 2025-05-12 RX ADMIN — FUROSEMIDE 80 MG: 10 INJECTION, SOLUTION INTRAMUSCULAR; INTRAVENOUS at 13:53

## 2025-05-12 RX ADMIN — ESCITALOPRAM OXALATE 10 MG: 10 TABLET ORAL at 08:15

## 2025-05-12 ASSESSMENT — ACTIVITIES OF DAILY LIVING (ADL)
ADLS_ACUITY_SCORE: 64
ADLS_ACUITY_SCORE: 69
ADLS_ACUITY_SCORE: 69
ADLS_ACUITY_SCORE: 66
ADLS_ACUITY_SCORE: 66
ADLS_ACUITY_SCORE: 71
ADLS_ACUITY_SCORE: 69
ADLS_ACUITY_SCORE: 66
ADLS_ACUITY_SCORE: 66
ADLS_ACUITY_SCORE: 69
ADLS_ACUITY_SCORE: 69
ADLS_ACUITY_SCORE: 71
ADLS_ACUITY_SCORE: 69
ADLS_ACUITY_SCORE: 69
ADLS_ACUITY_SCORE: 66
ADLS_ACUITY_SCORE: 69
ADLS_ACUITY_SCORE: 66
ADLS_ACUITY_SCORE: 69
ADLS_ACUITY_SCORE: 71
ADLS_ACUITY_SCORE: 69
ADLS_ACUITY_SCORE: 71

## 2025-05-12 NOTE — PROGRESS NOTES
[facility] [place]    Medicine Progress Note - Hospitalist Service    Date of Admission:  5/8/2025    Assessment & Plan   49-year-old female with pulmonary HTN, chronic respiratory failure and recent DVT presented with shortness of breath and weakness and found to be septic and hypoxic with LLE cellulitis and in decompensated heart failure.    #Severe sepsis with endorgan dysfunction involving lactic acidosis, worsened respiratory failure  #Gram-negative bacteremia  #Cellulitis, LLE  #Recent shingles, L flank  #Candida intertrigo  Blood culture grew pan-sensitive Pseudomonas  MRSA screen negative  S/p IV fluid hydration 2.5L  CT showed no abscess  ID consulted- continue zosyn and added micafungin x72hr, ppx PO vanc    #Acute on chronic HFpEF  #Critical pulmonary hypertension  #Acute on chronic respiratory failure with hypoxia and hypercapnia  S/p 10 L oximask, BiPAP, now weaned to nasal cannula  Cardiology consulted: lasix gtt switched to IV Q8, continued dobutamine gtt  Holding PTA spironolactone, Bumex  CTA chest shows no PE    #Recent DVT, LLE  #Chronic lymphedema  PTA Eliquis  Lymph team consulted    #Prediabetes  #Hypoglycemia, resolved  Stopped SSI and Accu-Cheks    #Morbid obesity  BMI 50      VTE PPx: DOAC          Diet: Low Saturated Fat Na <2400 mg  Fluid restriction 1800 ML FLUID    Brar Catheter: Not present  Lines: None     Cardiac Monitoring: ACTIVE order. Indication: Acute decompensated heart failure (48 hours)  Code Status: Full Code      Clinically Significant Risk Factors        # Hypokalemia: Lowest K = 3.3 mmol/L in last 2 days, will replace as needed            # Hypertension: Noted on problem list  # Acute heart failure with preserved ejection fraction: heart failure noted on problem list, last echo with EF >50%, and receiving IV diuretics           # Morbid Obesity: Estimated body mass index is 52.64 kg/m  as calculated from the following:    Height as of this encounter:  "1.626 m (5' 4\").    Weight as of this encounter: 139.1 kg (306 lb 10.6 oz)., PRESENT ON ADMISSION     # Financial/Environmental Concerns: none         Social Drivers of Health    Food Insecurity: Low Risk  (5/8/2025)    Food Insecurity     Within the past 12 months, did you worry that your food would run out before you got money to buy more?: No     Within the past 12 months, did the food you bought just not last and you didn t have money to get more?: No   Recent Concern: Food Insecurity - High Risk (3/23/2025)    Food Insecurity     Within the past 12 months, did you worry that your food would run out before you got money to buy more?: Yes     Within the past 12 months, did the food you bought just not last and you didn t have money to get more?: No   Housing Stability: High Risk (5/8/2025)    Housing Stability     Do you have housing? : No     Are you worried about losing your housing?: No   Transportation Needs: High Risk (5/8/2025)    Transportation Needs     Within the past 12 months, has lack of transportation kept you from medical appointments, getting your medicines, non-medical meetings or appointments, work, or from getting things that you need?: Yes   Physical Activity: Inactive (3/21/2024)    Exercise Vital Sign     Days of Exercise per Week: 0 days     Minutes of Exercise per Session: 0 min   Interpersonal Safety: Low Risk  (5/8/2025)    Interpersonal Safety     Do you feel physically and emotionally safe where you currently live?: Yes     Within the past 12 months, have you been hit, slapped, kicked or otherwise physically hurt by someone?: No     Within the past 12 months, have you been humiliated or emotionally abused in other ways by your partner or ex-partner?: No   Recent Concern: Interpersonal Safety - High Risk (3/23/2025)    Interpersonal Safety     Do you feel physically and emotionally safe where you currently live?: No     Within the past 12 months, have you been hit, slapped, kicked or " otherwise physically hurt by someone?: No     Within the past 12 months, have you been humiliated or emotionally abused in other ways by your partner or ex-partner?: No   Social Connections: Unknown (3/1/2022)    Social Connection and Isolation Panel [NHANES]     Frequency of Communication with Friends and Family: Twice a week     Frequency of Social Gatherings with Friends and Family: Twice a week          Disposition Plan     Medically Ready for Discharge: Anticipated in 2-4 Days             Michael Cabrera DO  Hospitalist Service  [facility]  Securely message with [employer] (more info)  Text page via [facility] Paging/Directory   ______________________________________________________________________    Interval History   Feeling a little better today    Physical Exam   Vital Signs: Temp: 98.1  F (36.7  C) Temp src: Axillary BP: 115/75 Pulse: 78   Resp: 24 SpO2: 95 % O2 Device: BiPAP/CPAP Oxygen Delivery: 3 LPM  Weight: 306 lbs 10.56 oz    General Appearance:  No acute distress  Respiratory: Clear to auscultation bilaterally  Cardiovascular: Regular rate and rhythm  Neuro: Alert and oriented x 3, normal speech      Medical Decision Making       50 MINUTES SPENT BY ME on the date of service doing chart review, history, exam, documentation & further activities per the note.      Data

## 2025-05-12 NOTE — PROGRESS NOTES
Care Management Follow Up    Length of Stay (days): 4    Expected Discharge Date: 05/14/2025    Anticipated Discharge Plan:       Transportation: Anticipate medical transport    PT Recommendations: Transitional Care Facility  OT Recommendations:  Transitional Care Facility     Barriers to Discharge: medical stability, placement    Prior Living Situation: town home with alone    Discussed  Partnership in Safe Discharge Planning  document with patient/family: No     Handoff Completed: Yes, [facility] PCP: Internal handoff referral completed    Patient/Spokesperson Updated: Yes. Who? pt    Additional Information:  CM met with pt in room to discuss discharge planning. Therapy rec is TCU, pt is agreeable. [facility]. She will review TCU list for additional choices.     Next Steps: get more TCU choices    1:28 PM  [facility]. CM updated pt in room, she is agreeable with placement.     aPlmira Merrill, CHANCESW

## 2025-05-12 NOTE — PROGRESS NOTES
[facility] Inpatient follow up       Patient:  Bess Enamorado  Date of birth 1975, Medical record number [id number]  Date of Visit:  05/12/2025  Attending Physician: Michael Cabrera DO         Assessment and Recommendations:   Assessment:  Bess Enamorado is a 49 year old female with   Super morbid obesity with BMI of 51 and hypoventilation syndrome.  Chronic respiratory failure, oxygen dependent on 3 L nasal cannula at home.  History of Staphylococcus lugdunensis bacteremia in March 2024.  Bilateral lower extremity lymphedema  Recently history of thoracoabdominal shingles.  Diagnosed on 4/15/2025.  No active lesion.  Admitted with sepsis with temperature of 101.7 and mild leukocytosis.  Initially treated with azithromycin plus vancomycin plus Zosyn.  Now on Zosyn monotherapy.  Pseudomonas bacteremia.  Source is left lower extremity cellulitis.  Positive blood culture on 5/8/2025.  Left lower extremity cellulitis.  Improving.  Candida intertrigo.  Improving.  Reported history of C. difficile in the past.  I am unable to see the positive test on chart review.  Report having needed prophylactic vancomycin with antibiotic exposure in the past.    Recommendations:  Discontinue micafungin  Discontinue zsyn  Po levaquin x 7 days  Keep po vanco x 12 days  Topical antifungals  Will sign off, please call with questions        Alyssa Call M.D.              Interval History:     HPI:    Seeing first time  She confirms redness and pain  better    IMPRESSION:  1.  Enlarged main pulmonary artery consistent with pulmonary arterial hypertension.  2.  New, minimal groundglass opacities are noted dependently in the posterior right upper lobe with adjacent fissural thickening. Unclear if these are inflammatory in nature or reflect resolving atelectasis or edema.   3.  No effusions or other signs of failure or pneumonia.  4.  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  5.  No changes of acute or chronic pulmonary emboli.    Pertinent cultures include:  Culture Micro   Date Value Ref Range Status   03/04/2021 50,000 to 100,000 colonies/mL  Proteus mirabilis   (A)  Final   03/04/2021 (A)  Final    10,000 to 50,000 colonies/mL  Strain 2  Proteus mirabilis     03/04/2021 (A)  Final    Cultured on the 1st day of incubation:  Moraxella nonliquefaciens  Susceptibility testing done on previous specimen     03/04/2021   Final    Critical Value/Significant Value, preliminary result only, called to and read back by  Joceline Sierra RN 03/05/21 @2108 CARLOS     03/04/2021 (A)  Final    Cultured on the 2nd day of incubation:  Parvimonas micra     03/04/2021   Final    Critical Value/Significant Value, preliminary result only, called to and read back by  GILES ALBARADO RN 3.6.21 AT 1344. JL     03/04/2021 (A)  Final    Cultured on the 1st day of incubation:  Staphylococcus epidermidis     03/04/2021   Final    Critical Value/Significant Value called to and read back by  Ariel Robbins RN on 3.6.2021 at 1421. KVO     03/04/2021   Final    (Note)  NEGATIVE for the following: Staphylococcus spp., Staph aureus, Staph  epidermidis, Staph lugdunensis, Streptococcus spp., Strep pneumoniae,  Strep pyogenes, Strep agalactiae, Strep anginosus group, Enterococcus  faecalis, Enterococcus faecium, and Listeria spp. by CC video  multiplex nucleic acid test. Final identification and antimicrobial  susceptibility testing will be verified by standard methods.    Critical Value/Significant Value called to and read back by Ariel Robbins RN 1627 03.06.2021 NM         Recent Inflammatory Biomarkers:   Recent Labs   Lab Test 05/12/25  0401 05/11/25  0444 05/10/25  0529 05/09/25  0522 05/08/25  1148 04/03/25  0657 04/02/25  1627 03/23/25  0713 03/23/25  0027 02/17/25  0753 02/17/25  0752 02/13/25  0717 02/12/25  0720 02/11/25  1348  22  0510 22  0454 22  0502 02/10/22  0534 02/10/22  0533 22  0457 22  1620 22  1115 21  0600 21  0611   CRP  --   --   --   --   --   --   --   --   --   --   --   --   --   --   --  8.5* 13.1* 20.0*  --  21.8*  --  7.9*  --  144.0*   PCAL  --   --   --   --   --  0.33 0.22  --  0.27  --  0.14  --  0.65* 0.73*   < >  --   --   --   --   --    < >  --   --   --    WBC 5.6 7.2 9.0 11.2* 6.2 10.4 13.6*   < >  --    < >  --    < > 7.2 7.3   < >  --  5.9  --    < > 10.0   < > 15.3*   < > 7.3    < > = values in this interval not displayed.            Review of Systems:   CONSTITUTIONAL:    Temp Max: Temp (24hrs), Av.5  F (36.9  C), Min:97.6  F (36.4  C), Max:99.6  F (37.6  C)   .  Negative except for findings in the HPI.           Current Medications (antimicrobials listed in bold):     Current Facility-Administered Medications   Medication Dose Route Frequency Provider Last Rate Last Admin    apixaban ANTICOAGULANT (ELIQUIS) tablet 5 mg  5 mg Oral BID Cody Marcelo MD   5 mg at 25    aspirin EC tablet 81 mg  81 mg Oral Cody Nicolas MD   81 mg at 25 0814    [Held by provider] empagliflozin (JARDIANCE) tablet 10 mg  10 mg Oral Cody Nicolas MD        escitalopram (LEXAPRO) tablet 10 mg  10 mg Oral Cody Nicolas MD   10 mg at 25 0815    furosemide (LASIX) injection 80 mg  80 mg Intravenous TID Raúl Sanchez MD        micafungin (MYCAMINE) 150 mg in sodium chloride 0.9 % 100 mL intermittent infusion  150 mg Intravenous Q24H Thong Smith  mL/hr at 25 1522 150 mg at 25 1522    miconazole (MICATIN) 2 % powder   Topical BID Michael Cabrera DO   Given at 25 1033    piperacillin-tazobactam (ZOSYN) 4.5 g vial to attach to  mL bag  4.5 g Intravenous Q6H Cody Marcelo MD   4.5 g at 25 1045    simvastatin (ZOCOR) tablet 10 mg  10 mg Oral At Bedtime Cody Marcelo MD   10 mg  at 05/11/25 2023    sodium chloride (PF) 0.9% PF flush 3 mL  3 mL Intracatheter Q8H Leslie Givens MD   3 mL at 05/12/25 1107    sodium chloride (PF) 0.9% PF flush 3 mL  3 mL Intracatheter Q8H Cody Marcelo MD   3 mL at 05/11/25 2303    vancomycin (VANCOCIN) capsule 125 mg  125 mg Oral Daily Thong Smith MD   125 mg at 05/12/25 0814              Allergies:   No Known Allergies         Physical Exam:   Vitals were reviewed  Patient Vitals for the past 24 hrs:   BP Temp Temp src Pulse Resp SpO2 Weight   05/12/25 1216 -- -- -- -- -- 94 % --   05/12/25 1100 115/75 -- -- 110 -- -- --   05/12/25 0722 115/75 98.1  F (36.7  C) Axillary 78 24 95 % --   05/12/25 0431 -- -- -- 80 26 93 % --   05/12/25 0350 108/70 97.7  F (36.5  C) Axillary 80 24 93 % (!) 139.1 kg (306 lb 10.6 oz)   05/11/25 2325 -- -- -- 80 24 95 % --   05/11/25 2311 105/65 99.3  F (37.4  C) Axillary 80 25 94 % --   05/11/25 2100 -- -- -- 83 -- 93 % --   05/11/25 2017 101/68 -- -- -- -- -- --   05/11/25 1919 97/65 98.5  F (36.9  C) Oral 91 20 97 % --   05/11/25 1516 92/54 98.2  F (36.8  C) Oral 82 20 95 % --       Physical Examination:  Gen: Pleasant in no acute distress.  HEENT: NCAT. EOMI. PERRL.  Neck: No bruit, JVD or thyromegaly.  Lungs: Clear to ascultation bilat with no crackles or wheezes.  Card: RRR. NSR. No RMG. Peripheral pulses present and symmetric. No edema.  Abd: Soft NT ND. No mass. Normal bowel sounds.  Skin: No rash.  Extr: Improved left lower extremity cellulitis.  Bilateral stasis dermatitis from lymphedema.  Neuro: Alert and oriented to place time and person. Cranial nerves II to XII intact.        Laboratory Data:   ID Labs:  Microbiology labs:   Contains critical data Blood Culture Peripheral blood (BC) Arm, Left  Order: [id number]   Collected 5/8/2025 12:01 PM       Status: Final result    Test Result Released: Yes (not seen)    Specimen Information: Arm, Left; Peripheral blood (BC)   0 Result Notes  Culture Positive on  the 1st day of incubation Abnormal    Pseudomonas aeruginosa Panic    1 of 2 bottles        Resulting Agency: [facility]     Susceptibility     Pseudomonas aeruginosa     PHILIP     Cefepime 2 ug/mL Susceptible     Ceftazidime 2 ug/mL Susceptible     Ciprofloxacin 0.12 ug/mL Susceptible     Levofloxacin 0.5 ug/mL Susceptible     Meropenem <=0.25 ug/mL Susceptible     Piperacillin/Tazobactam 8 ug/mL Susceptible                 Specimen Collected: 05/08/25 12:01 PM Last Resulted: 05/11/25  7:34 AM     Recent Labs   Lab Test 02/12/22  0454 02/11/22  0502 02/10/22  0534 02/09/22  0457 02/08/22  1115 03/06/21  0611   CRP 8.5* 13.1* 20.0* 21.8* 7.9* 144.0*     Recent Labs   Lab Test 05/12/25  0401 05/11/25  0444 05/10/25  0529 05/09/25  0522 05/08/25  1148 04/03/25  0657   WBC 5.6 7.2 9.0 11.2* 6.2 10.4     Recent Labs   Lab Test 05/12/25  0401 05/11/25  0444 05/10/25  0529 05/09/25  0522   CR 0.80 0.80 0.97* 1.09*   GFRESTIMATED 89 89 71 62       Hematology Studies  Recent Labs   Lab Test 05/12/25  0401 05/11/25  0444 05/10/25  0529 05/09/25  0522 05/08/25  1148 04/03/25  0657 04/02/25  1627   WBC 5.6 7.2 9.0 11.2* 6.2 10.4 13.6*   ANEU 3.5 5.5 7.1  --  5.1 8.7* 12.1*   AEOS 0.3 0.3 0.1  --  0.1 0.4 0.3   HGB 12.2 12.1 12.1 12.8 14.4 13.4 14.4   HCT 37.7 38.4 36.4 39.4 45.6 42.0 44.5    167 160 187 205 170 207       Metabolic  Recent Labs   Lab Test 05/12/25  0401 05/11/25  0444 05/10/25  0529    136 137   BUN 13.9 15.7 21.5*   CO2 24 24 26   CR 0.80 0.80 0.97*   GFRESTIMATED 89 89 71       Hepatic Studies  Recent Labs   Lab Test 05/08/25  1148 04/02/25  1627 03/24/25  0705   BILITOTAL 2.3* 3.0* 2.8*   ALKPHOS 78 74 72   ALBUMIN 4.0 3.6 3.1*   AST 15 20 18   ALT 8 11 9       ImmunologlobulinsNo lab results found.         Imaging Data:   Reviewed

## 2025-05-12 NOTE — PROGRESS NOTES
HEART CARE NOTE          Assessment/Recommendations     1. HFpEF/RV dysfunction c/b cardiogenic shock  Assessment / Plan  Hypervolemic on physical exam; continue IV diuresis; continue to monitor UOP and renal function closely   Continue dobutamine gtt for hemodynamic support  Patient is at increased risk for adverse cardiac events 2/2 non-compliance, morbid obesity, renal dysfunction     2. HUNG in CKD  Assessment / Plan  Resolving; Follows with [facility]  Diuresis as above; renal function wnl; continue to monitor UOP and renal function closely     3. HTN  Assessment / Plan   Adequately controlled- borderline BP - no additional recommendations at this time     4. NARCISA  Assessment / Plan  CPAP at bedtime     5. PE c/b Core pulmonale + pulmonary HTN  Assessment / Plan  Hx of PE; currently on apixaban  C/b Core pulmonale; pulm Htn likely multifactorial given hx of PE and current WHO Group 2 2/2 volume overload - plans for RHC this admission once near euvolemia    Patient remains critically ill requiring hemodynamic support via dobutamine in the setting of pulmHTN c/b cardiogenic shock. 60 minutes spent on critical care.      History of Present Illness/Subjective    Ms. Bess Enamorado is a 49 year old female with a PMHx significant for (per Epic notation) Chronic Hypoxic Respiratory Failure (on 3L NC at home), Pulmonary HTN, Chronic CHF who was admitted on 5/8/25 after presenting to [facility] ED for Sepsis with Hypoxia.     Today, Mrs. Enamorado denies acute cardiac events or complaints; Management plan as detailed above    ECG: personally reviewed; normal sinus rhythm, nonspecific ST and T waves changes, incomplete RBBB.    ECHO (personnaly Reviewed on 5/12/25):  1. The left ventricle is normal in size. Image quality does not provide for  detailed assessment of LV systolic function, but is felt to be normal with a  visually estimated ejection fraction of roughly 65-70%.  2. There is mild calcific mitral stenosis.  3.  "There is prominent right ventricular enlargement with reduced right  ventricular systolic performance though difficult to quantify due to  suboptimal acoustic imaging.  4. There is biatrial enlargement though difficult to quantify due to  suboptimal acoustic imaging.  5. Right ventricular systolic pressure relative to right atrial pressure is  severely increased. The pulmonary artery pressure is estimated to be   mmHg plus right atrial pressure (the IVC is mildly dilated).     The acoustic quality of this study is very poor. If a more accurate assessment  of left ventricular systolicperformance, regional wall motion, and other  morphology/function is required; would recommend cardiac MRI or other  alternative imaging modality for further evaluation.     When compared to the prior real-time echocardiogram dated 12 February 2025,  there has been a further increase in the pulmonary artery pressure estimate.    Lab results: personally reviewed May 12, 2025; notable for renal function wnl    Medical history and pertinent documents reviewed in Care Everywhere please where applicable see details above        Physical Examination Review of Systems   /70 (BP Location: Left arm)   Pulse 80   Temp 97.7  F (36.5  C) (Axillary)   Resp 26   Ht 1.626 m (5' 4\")   Wt (!) 139.1 kg (306 lb 10.6 oz)   LMP 04/08/2025 (Within Days)   SpO2 93%   BMI 52.64 kg/m    Body mass index is 52.64 kg/m .  Wt Readings from Last 3 Encounters:   05/12/25 (!) 139.1 kg (306 lb 10.6 oz)   04/15/25 116.9 kg (257 lb 12.8 oz)   04/03/25 123.4 kg (272 lb 0.8 oz)     General Appearance:   no distress, normal body habitus   ENT/Mouth: membranes moist, no oral lesions or bleeding gums.      EYES:  no scleral icterus, normal conjunctivae   Neck: no carotid bruits or thyromegaly   Chest/Lungs:   lungs are clear to auscultation, no rales or wheezing, equal chest wall expansion    Cardiovascular:   Regular. Normal first and second heart sounds " with no murmurs, rubs, or gallops; the carotid, radial and posterior tibial pulses are intact, + JVD and LE edema bilaterally    Abdomen:  no organomegaly, masses, bruits, or tenderness; bowel sounds are present   Extremities: no cyanosis or clubbing   Skin: no xanthelasma, warm.    Neurologic: NAD     Psychiatric: alert and oriented x3, calm     A complete 10 systems ROS was reviewed  And is negative except what is listed in the HPI.          Medical History  Surgical History Family History Social History   Past Medical History:   Diagnosis Date    Acute on chronic diastolic congestive heart failure (H) 02/09/2022    Arthritis 2019    Hips    ASCUS favor benign 08/2015    Neg HPV    Deep vein thrombosis (DVT) of left lower extremity (H) 03/25/2025    Depressive disorder 2010    H/O colposcopy with cervical biopsy 09/14/2015    Bx & ECC - negative    Hypertension 2019    LSIL on Pap smear 07/2014    Neg high risk HPV    Multiple sclerosis (H) 08/29/2005 9/18/200pt dx with MS 2004--dx by neurolgist and is followed by Dr. Harris    Myocardial infarction (H)     Obese     Pneumonia due to infectious organism, unspecified laterality, unspecified part of lung 02/08/2022    Sepsis (Temp 101, , WBC 13.0) 10/23/2018    Shingles 10/2016    SIRS (systemic inflammatory response syndrome) (H) 03/04/2021    Sleep apnea     UNSPEC CONSTIPATION 09/18/2006 9/18/2006   Has tried otc suppository and otc lax.     Past Surgical History:   Procedure Laterality Date    CHOLECYSTECTOMY, LAPOROSCOPIC      Cholecystectomy, Laparoscopic    COLONOSCOPY  2007?    Bathroom issue..results normal    COMBINED CYSTOSCOPY, RETROGRADES, EXCHANGE STENT URETER(S) Right 2/21/2022    Procedure: CYSTOSCOPY, WITH right RETROGRADE PYELOGRAM AND right URETERAL STENT PLACEMENT;  Surgeon: Doyle Palomares MD;  Location: [facility] OR    OPEN REDUCTION INTERNAL FIXATION TOE(S)  4/13/2012    Procedure:OPEN REDUCTION INTERNAL FIXATION TOE(S); Open  reduction internal fixation right proximal fifth metatarsal fracture-   Anes-choice block; Surgeon:LEY, JEFFREY DUANE; Location:[place] OR    PICC SINGLE LUMEN PLACEMENT  3/20/2024    PICC TRIPLE LUMEN PLACEMENT  2/21/2022         no family history of premature coronary artery disease Social History     Socioeconomic History    Marital status: Single     Spouse name: Not on file    Number of children: Not on file    Years of education: Not on file    Highest education level: Not on file   Occupational History     Employer: [employer]   Tobacco Use    Smoking status: Never    Smokeless tobacco: Never   Vaping Use    Vaping status: Never Used   Substance and Sexual Activity    Alcohol use: Yes     Comment: social    Drug use: No    Sexual activity: Not Currently     Partners: Male     Birth control/protection: Pill   Other Topics Concern    Parent/sibling w/ CABG, MI or angioplasty before 65F 55M? No   Social History Narrative    , 3rd-5th grade     Social Drivers of Health     Financial Resource Strain: Low Risk  (5/8/2025)    Financial Resource Strain     Within the past 12 months, have you or your family members you live with been unable to get utilities (heat, electricity) when it was really needed?: No   Food Insecurity: Low Risk  (5/8/2025)    Food Insecurity     Within the past 12 months, did you worry that your food would run out before you got money to buy more?: No     Within the past 12 months, did the food you bought just not last and you didn t have money to get more?: No   Recent Concern: Food Insecurity - High Risk (3/23/2025)    Food Insecurity     Within the past 12 months, did you worry that your food would run out before you got money to buy more?: Yes     Within the past 12 months, did the food you bought just not last and you didn t have money to get more?: No   Transportation Needs: High Risk (5/8/2025)    Transportation Needs     Within the past 12 months, has lack of  transportation kept you from medical appointments, getting your medicines, non-medical meetings or appointments, work, or from getting things that you need?: Yes   Physical Activity: Inactive (3/21/2024)    Exercise Vital Sign     Days of Exercise per Week: 0 days     Minutes of Exercise per Session: 0 min   Stress: No Stress Concern Present (12/4/2020)    Citizen of [place] [employer] - Occupational Stress Questionnaire     Feeling of Stress : Only a little   Social Connections: Unknown (3/1/2022)    Social Connection and Isolation Panel [NHANES]     Frequency of Communication with Friends and Family: Twice a week     Frequency of Social Gatherings with Friends and Family: Twice a week     Attends Denominational Services: Not on file     Active Member of Clubs or Organizations: Not on file     Attends Club or Organization Meetings: Not on file     Marital Status: Not on file   Interpersonal Safety: Low Risk  (5/8/2025)    Interpersonal Safety     Do you feel physically and emotionally safe where you currently live?: Yes     Within the past 12 months, have you been hit, slapped, kicked or otherwise physically hurt by someone?: No     Within the past 12 months, have you been humiliated or emotionally abused in other ways by your partner or ex-partner?: No   Recent Concern: Interpersonal Safety - High Risk (3/23/2025)    Interpersonal Safety     Do you feel physically and emotionally safe where you currently live?: No     Within the past 12 months, have you been hit, slapped, kicked or otherwise physically hurt by someone?: No     Within the past 12 months, have you been humiliated or emotionally abused in other ways by your partner or ex-partner?: No   Housing Stability: High Risk (5/8/2025)    Housing Stability     Do you have housing? : No     Are you worried about losing your housing?: No           Lab Results    Chemistry/lipid CBC Cardiac Enzymes/BNP/TSH/INR   Lab Results   Component Value Date    CHOL 81  02/12/2025    HDL 11 (L) 02/12/2025    TRIG 134 02/12/2025    BUN 13.9 05/12/2025     05/12/2025    CO2 24 05/12/2025    Lab Results   Component Value Date    WBC 5.6 05/12/2025    HGB 12.2 05/12/2025    HCT 37.7 05/12/2025    MCV 90 05/12/2025     05/12/2025    Lab Results   Component Value Date    TROPONINI 0.03 04/06/2022     (H) 04/06/2022    TSH 3.52 02/12/2025    INR 1.30 (H) 03/23/2025     Lab Results   Component Value Date    TROPONINI 0.03 04/06/2022          Weight:    Wt Readings from Last 3 Encounters:   05/12/25 (!) 139.1 kg (306 lb 10.6 oz)   04/15/25 116.9 kg (257 lb 12.8 oz)   04/03/25 123.4 kg (272 lb 0.8 oz)       Allergies  No Known Allergies      Surgical History  Past Surgical History:   Procedure Laterality Date    CHOLECYSTECTOMY, LAPOROSCOPIC      Cholecystectomy, Laparoscopic    COLONOSCOPY  2007?    Bathroom issue..results normal    COMBINED CYSTOSCOPY, RETROGRADES, EXCHANGE STENT URETER(S) Right 2/21/2022    Procedure: CYSTOSCOPY, WITH right RETROGRADE PYELOGRAM AND right URETERAL STENT PLACEMENT;  Surgeon: Doyle Palomares MD;  Location: [facility] OR    OPEN REDUCTION INTERNAL FIXATION TOE(S)  4/13/2012    Procedure:OPEN REDUCTION INTERNAL FIXATION TOE(S); Open reduction internal fixation right proximal fifth metatarsal fracture-   Anes-choice block; Surgeon:LEY, JEFFREY DUANE; Location:[place] OR    PICC SINGLE LUMEN PLACEMENT  3/20/2024    PICC TRIPLE LUMEN PLACEMENT  2/21/2022            Social History  Tobacco:   History   Smoking Status    Never   Smokeless Tobacco    Never    Alcohol:   Social History    Substance and Sexual Activity      Alcohol use: Yes        Comment: social   Illicit Drugs:   History   Drug Use No       Family History  Family History   Problem Relation Age of Onset    Neurologic Disorder Father         MS    Heart Disease Father         irregular heart rate    Diabetes Father     Melanoma Father     Sleep Apnea Father     Other Cancer Father      Cancer Maternal Grandfather         lung    Other Cancer Maternal Grandfather     Cancer Paternal Grandmother         stomach    Other Cancer Paternal Grandmother     Cancer Mother     Other Cancer Mother     Thyroid Disease Mother     Gynecology Maternal Aunt         PCOS    Hypertension Maternal Grandmother     Anxiety Disorder Maternal Grandmother     Thyroid Disease Maternal Grandmother     Anxiety Disorder Sister           Raúl Sanchez MD on 5/12/2025      cc: Mikala Luna

## 2025-05-12 NOTE — PLAN OF CARE
Heart Failure Care Map  GOALS TO BE MET BEFORE DISCHARGE:    1. Decrease congestion and/or edema with diuretic therapy to achieve near optimal volume status.     Dyspnea improved: Yes, satisfactory for discharge.   Edema improved: No, further care required to meet this goal. Please explain BLE edematous        Last 24 hour I/O:   Intake/Output Summary (Last 24 hours) at 5/12/2025 0600  Last data filed at 5/12/2025 0357  Gross per 24 hour   Intake 1832.04 ml   Output 1875 ml   Net -42.96 ml           Net I/O and Weights since admission:   04/12 0700 - 05/12 0659  In: 3853.46 [P.O.:3320; I.V.:533.46]  Out: 8150 [Urine:8150]  Net: -4296.54     Vitals:    05/09/25 0825 05/11/25 0344 05/12/25 0350   Weight: 134.2 kg (295 lb 13.7 oz) 130.2 kg (287 lb 0.6 oz) (!) 139.1 kg (306 lb 10.6 oz)       2.  O2 sats > 90% on room air, or at prior home O2 therapy level.      Able to wean O2 this shift to keep sats above 90%?: Yes, satisfactory for discharge.   Does patient use Home O2? Yes-  3L          Current oxygenation status:   SpO2: 93 %     O2 Device: BiPAP/CPAP, Oxygen Delivery: 3 LPM    3.  Tolerates ambulation and mobility near baseline.     Ambulation: No, further care required to meet this goal. Please explain Bed bound   Times patient ambulated with staff this shift: 0    Please review the Heart Failure Care Map for additional HF goal outcomes.    Jessica Garcia RN  5/12/2025     Problem: Pain Acute  Goal: Optimal Pain Control and Function  Outcome: Progressing     Problem: Infection  Goal: Absence of Infection Signs and Symptoms  Outcome: Progressing     Problem: Sepsis/Septic Shock  Goal: Blood Glucose Level Within Targeted Range  Outcome: Progressing  Goal: Absence of Infection Signs and Symptoms  Outcome: Progressing  Intervention: Promote Recovery  Recent Flowsheet Documentation  Taken 5/12/2025 0030 by Jessica Garcia, RN  Activity Management: activity adjusted per tolerance  Goal: Optimal Nutrition  Intake  Outcome: Progressing   Goal Outcome Evaluation:  AOX4. Intermittent pain to LLE, elevated on pillows. Bipap overnight. O2 sats WNL. Denies SOB. Purewick in place. Slept well. Dobutamine gtt.

## 2025-05-12 NOTE — PLAN OF CARE
Problem: Adult Inpatient Plan of Care  Goal: Optimal Comfort and Wellbeing  Outcome: Progressing     Problem: Comorbidity Management  Goal: Blood Glucose Levels Within Targeted Range  Outcome: Progressing   Goal Outcome Evaluation:      Plan of Care Reviewed With: patient    Overall Patient Progress: improvingOverall Patient Progress: improving     Pt is A&Ox4, SR, on 3L NC and BiPAP a night, and heavy assistx2 with cares. Dobutamine gtt running. 1800 ml fluid restriction in place. Pt c/o pain after sitting int he chair, moved back to bed.     Pt bleeding, stated she had gotten her period, notified Dr. Cabrera.     Majo Gonzalez, KINDRA

## 2025-05-13 ENCOUNTER — MEDICAL CORRESPONDENCE (OUTPATIENT)
Dept: HEALTH INFORMATION MANAGEMENT | Facility: CLINIC | Age: 50
End: 2025-05-13

## 2025-05-13 ENCOUNTER — APPOINTMENT (OUTPATIENT)
Dept: OCCUPATIONAL THERAPY | Facility: HOSPITAL | Age: 50
End: 2025-05-13
Payer: COMMERCIAL

## 2025-05-13 LAB
ANION GAP SERPL CALCULATED.3IONS-SCNC: 10 MMOL/L (ref 7–15)
BUN SERPL-MCNC: 11.4 MG/DL (ref 6–20)
CALCIUM SERPL-MCNC: 9.8 MG/DL (ref 8.8–10.4)
CHLORIDE SERPL-SCNC: 101 MMOL/L (ref 98–107)
CREAT SERPL-MCNC: 0.76 MG/DL (ref 0.51–0.95)
EGFRCR SERPLBLD CKD-EPI 2021: >90 ML/MIN/1.73M2
GLUCOSE SERPL-MCNC: 83 MG/DL (ref 70–99)
HCO3 SERPL-SCNC: 25 MMOL/L (ref 22–29)
HOLD SPECIMEN: NORMAL
POTASSIUM SERPL-SCNC: 3.1 MMOL/L (ref 3.4–5.3)
SODIUM SERPL-SCNC: 136 MMOL/L (ref 135–145)

## 2025-05-13 PROCEDURE — 97110 THERAPEUTIC EXERCISES: CPT | Mod: GO

## 2025-05-13 PROCEDURE — 250N000011 HC RX IP 250 OP 636: Performed by: INTERNAL MEDICINE

## 2025-05-13 PROCEDURE — 250N000011 HC RX IP 250 OP 636: Performed by: STUDENT IN AN ORGANIZED HEALTH CARE EDUCATION/TRAINING PROGRAM

## 2025-05-13 PROCEDURE — P9047 ALBUMIN (HUMAN), 25%, 50ML: HCPCS | Performed by: INTERNAL MEDICINE

## 2025-05-13 PROCEDURE — 250N000013 HC RX MED GY IP 250 OP 250 PS 637: Performed by: STUDENT IN AN ORGANIZED HEALTH CARE EDUCATION/TRAINING PROGRAM

## 2025-05-13 PROCEDURE — 210N000001 HC R&B IMCU HEART CARE

## 2025-05-13 PROCEDURE — 250N000013 HC RX MED GY IP 250 OP 250 PS 637: Performed by: HOSPITALIST

## 2025-05-13 PROCEDURE — 82310 ASSAY OF CALCIUM: CPT | Performed by: HOSPITALIST

## 2025-05-13 PROCEDURE — 99233 SBSQ HOSP IP/OBS HIGH 50: CPT | Performed by: INTERNAL MEDICINE

## 2025-05-13 PROCEDURE — 94660 CPAP INITIATION&MGMT: CPT

## 2025-05-13 PROCEDURE — 999N000157 HC STATISTIC RCP TIME EA 10 MIN

## 2025-05-13 PROCEDURE — 99233 SBSQ HOSP IP/OBS HIGH 50: CPT | Performed by: HOSPITALIST

## 2025-05-13 PROCEDURE — 36415 COLL VENOUS BLD VENIPUNCTURE: CPT | Performed by: HOSPITALIST

## 2025-05-13 RX ORDER — POTASSIUM CHLORIDE 1500 MG/1
60 TABLET, EXTENDED RELEASE ORAL ONCE
Status: COMPLETED | OUTPATIENT
Start: 2025-05-13 | End: 2025-05-13

## 2025-05-13 RX ORDER — ALBUMIN (HUMAN) 12.5 G/50ML
50 SOLUTION INTRAVENOUS ONCE
Status: COMPLETED | OUTPATIENT
Start: 2025-05-13 | End: 2025-05-13

## 2025-05-13 RX ORDER — LEVOFLOXACIN 750 MG/1
750 TABLET, FILM COATED ORAL EVERY 24 HOURS
Status: DISCONTINUED | OUTPATIENT
Start: 2025-05-13 | End: 2025-05-19 | Stop reason: HOSPADM

## 2025-05-13 RX ORDER — POTASSIUM CHLORIDE 1500 MG/1
40 TABLET, EXTENDED RELEASE ORAL ONCE
Status: COMPLETED | OUTPATIENT
Start: 2025-05-13 | End: 2025-05-13

## 2025-05-13 RX ORDER — VANCOMYCIN HYDROCHLORIDE 125 MG/1
125 CAPSULE ORAL DAILY
Status: DISCONTINUED | OUTPATIENT
Start: 2025-05-14 | End: 2025-05-19 | Stop reason: HOSPADM

## 2025-05-13 RX ADMIN — FUROSEMIDE 80 MG: 10 INJECTION, SOLUTION INTRAMUSCULAR; INTRAVENOUS at 14:00

## 2025-05-13 RX ADMIN — ALBUMIN HUMAN 50 G: 0.25 SOLUTION INTRAVENOUS at 06:29

## 2025-05-13 RX ADMIN — POTASSIUM CHLORIDE 60 MEQ: 1500 TABLET, EXTENDED RELEASE ORAL at 08:40

## 2025-05-13 RX ADMIN — MICONAZOLE NITRATE: 20 POWDER TOPICAL at 08:42

## 2025-05-13 RX ADMIN — FUROSEMIDE 80 MG: 10 INJECTION, SOLUTION INTRAMUSCULAR; INTRAVENOUS at 20:04

## 2025-05-13 RX ADMIN — MICONAZOLE NITRATE: 20 POWDER TOPICAL at 20:08

## 2025-05-13 RX ADMIN — SIMVASTATIN 10 MG: 10 TABLET, FILM COATED ORAL at 20:03

## 2025-05-13 RX ADMIN — ASPIRIN 81 MG: 81 TABLET, COATED ORAL at 08:40

## 2025-05-13 RX ADMIN — APIXABAN 5 MG: 5 TABLET, FILM COATED ORAL at 20:03

## 2025-05-13 RX ADMIN — POTASSIUM CHLORIDE 40 MEQ: 1500 TABLET, EXTENDED RELEASE ORAL at 17:08

## 2025-05-13 RX ADMIN — FUROSEMIDE 80 MG: 10 INJECTION, SOLUTION INTRAMUSCULAR; INTRAVENOUS at 08:41

## 2025-05-13 RX ADMIN — DOBUTAMINE IN DEXTROSE 1.25 MCG/KG/MIN: 200 INJECTION, SOLUTION INTRAVENOUS at 11:27

## 2025-05-13 RX ADMIN — ESCITALOPRAM OXALATE 10 MG: 10 TABLET ORAL at 08:40

## 2025-05-13 RX ADMIN — LEVOFLOXACIN 750 MG: 750 TABLET, FILM COATED ORAL at 14:07

## 2025-05-13 RX ADMIN — VANCOMYCIN HYDROCHLORIDE 125 MG: 125 CAPSULE ORAL at 08:41

## 2025-05-13 RX ADMIN — PIPERACILLIN AND TAZOBACTAM 4.5 G: 4; .5 INJECTION, POWDER, FOR SOLUTION INTRAVENOUS at 06:20

## 2025-05-13 RX ADMIN — APIXABAN 5 MG: 5 TABLET, FILM COATED ORAL at 08:40

## 2025-05-13 ASSESSMENT — ACTIVITIES OF DAILY LIVING (ADL)
ADLS_ACUITY_SCORE: 64
ADLS_ACUITY_SCORE: 66
ADLS_ACUITY_SCORE: 64
ADLS_ACUITY_SCORE: 66
ADLS_ACUITY_SCORE: 64
ADLS_ACUITY_SCORE: 66
ADLS_ACUITY_SCORE: 64
ADLS_ACUITY_SCORE: 62
ADLS_ACUITY_SCORE: 66
ADLS_ACUITY_SCORE: 64
ADLS_ACUITY_SCORE: 66
ADLS_ACUITY_SCORE: 64
ADLS_ACUITY_SCORE: 66

## 2025-05-13 NOTE — PLAN OF CARE
Goal Outcome Evaluation:       Heart Failure Care Map  GOALS TO BE MET BEFORE DISCHARGE:    1. Decrease congestion and/or edema with diuretic therapy to achieve near optimal volume status.     Dyspnea improved: Yes, satisfactory for discharge.   Edema improved: No, further care required to meet this goal. Please explain Still edematous in lower extremities         Last 24 hour I/O:   Intake/Output Summary (Last 24 hours) at 5/13/2025 2245  Last data filed at 5/13/2025 2200  Gross per 24 hour   Intake 240 ml   Output 8250 ml   Net -8010 ml           Net I/O and Weights since admission:   04/13 2300 - 05/13 2259  In: 4763.56 [P.O.:4150; I.V.:613.56]  Out: 96176 [Urine:96980]  Net: -78758.44     Vitals:    05/09/25 0825 05/11/25 0344 05/12/25 0350   Weight: 134.2 kg (295 lb 13.7 oz) 130.2 kg (287 lb 0.6 oz) (!) 139.1 kg (306 lb 10.6 oz)       2.  O2 sats > 90% on room air, or at prior home O2 therapy level.      Able to wean O2 this shift to keep sats above 90%?: Yes, satisfactory for discharge.   Does patient use Home O2? Yes-  3L          Current oxygenation status:   SpO2: 93 %     O2 Device: BiPAP/CPAP, Oxygen Delivery: 3 LPM    3.  Tolerates ambulation and mobility near baseline.     Ambulation: No, further care required to meet this goal. Please explain Patient still not tolerating ambulation well   Times patient ambulated with staff this shift: 0    Patient is A/Ox4, on 3L NC during day and Bipap @ night, and assist x2 with cares. Switched to oral antibiotics today. Purewick in place and draining. Continues to refuse repositioning as it makes her uncomfortable and causes pain. Education given on importance of turning to prevent pressure ulcer worsening.    Dobutamine drip running per orders, 1.25mcg/kg/min.    Fabian Ellison RN

## 2025-05-13 NOTE — PLAN OF CARE
Problem: Adult Inpatient Plan of Care  Goal: Optimal Comfort and Wellbeing  Outcome: Progressing     Problem: Comorbidity Management  Goal: Maintenance of COPD Symptom Control  Outcome: Progressing  Intervention: Maintain COPD Symptom Control  Recent Flowsheet Documentation  Taken 5/12/2025 2336 by Sandra Casiano RN  Medication Review/Management: medications reviewed  Goal: Maintenance of Heart Failure Symptom Control  Outcome: Progressing  Intervention: Maintain Heart Failure Management  Recent Flowsheet Documentation  Taken 5/12/2025 2336 by Sandra Casiano RN  Medication Review/Management: medications reviewed     Problem: Pain Acute  Goal: Optimal Pain Control and Function  Outcome: Progressing  Intervention: Prevent or Manage Pain  Recent Flowsheet Documentation  Taken 5/12/2025 2336 by Sandra Casiano RN  Medication Review/Management: medications reviewed     Problem: Gas Exchange Impaired  Goal: Optimal Gas Exchange  Outcome: Progressing     Problem: Heart Failure  Goal: Stable Heart Rate and Rhythm  Outcome: Progressing  Goal: Effective Oxygenation and Ventilation  Outcome: Progressing  Goal: Effective Breathing Pattern During Sleep  Outcome: Progressing  Intervention: Monitor and Manage Obstructive Sleep Apnea  Recent Flowsheet Documentation  Taken 5/12/2025 2336 by Sandra Casiano RN  Medication Review/Management: medications reviewed   Goal Outcome Evaluation:               Patient denied pain and was bale to sleep for most of the night with Bipap on. Patient regularly refuses repositioning as she reports she can't fall asleep with pillows behind her back. Tele NSR 1AVB BBB. Has dobutamine running at 5 ml/hr. Using 3L oxygen via NC when not on Bipap which is baseline. Urine is red in color due  to menses.      Heart Failure Care Map  GOALS TO BE MET BEFORE DISCHARGE:    1. Decrease congestion and/or edema with diuretic therapy to achieve near optimal volume status.     Dyspnea improved: Yes,  satisfactory for discharge.   Edema improved: No, further care required to meet this goal. Please explain conitnue diuresis        Last 24 hour I/O:   Intake/Output Summary (Last 24 hours) at 5/13/2025 0645  Last data filed at 5/13/2025 0318  Gross per 24 hour   Intake 670.1 ml   Output 6550 ml   Net -5879.9 ml           Net I/O and Weights since admission:   04/13 0700 - 05/13 0659  In: 4523.56 [P.O.:3910; I.V.:613.56]  Out: 47735 [Urine:05298]  Net: -52249.44     Vitals:    05/09/25 0825 05/11/25 0344 05/12/25 0350   Weight: 134.2 kg (295 lb 13.7 oz) 130.2 kg (287 lb 0.6 oz) (!) 139.1 kg (306 lb 10.6 oz)       2.  O2 sats > 90% on room air, or at prior home O2 therapy level.      Able to wean O2 this shift to keep sats above 90%?: Yes, satisfactory for discharge.   Does patient use Home O2? Yes-  3L (currently Bipap at night)          Current oxygenation status:   SpO2: 94 %     O2 Device: BiPAP/CPAP, Oxygen Delivery: 3 LPM    3.  Tolerates ambulation and mobility near baseline.     Ambulation: No, further care required to meet this goal. Please explain not oob   Times patient ambulated with staff this shift: 0    Please review the Heart Failure Care Map for additional HF goal outcomes.    Sandra Nye RN  5/13/2025

## 2025-05-13 NOTE — PLAN OF CARE
Problem: Pain Acute  Goal: Optimal Pain Control and Function  Outcome: Progressing  Intervention: Prevent or Manage Pain  Recent Flowsheet Documentation  Taken 5/13/2025 0800 by Pretty Chambers RN  Medication Review/Management: medications reviewed     Problem: Sepsis/Septic Shock  Goal: Optimal Coping  Outcome: Progressing   Goal Outcome Evaluation:  Patient alert and oriented x 4. Very pleasant. Two assist to do AM Cares and patient tolerated swell. Legs are tender to touch and gently washed her legs. Patient able to assist with turning with 2 assist. She denied pain. Patient wears O2 at 3 liters per n/c which is her baseline at home too. Dobutamine gtt as ordered. Lasix given IV and patient diuresed well. Melody wick changed and patient and emptied twice today. Patient has her menstrual period now and urine blood tinged. IV zosyn discontinued. On oral antibiotics for left leg bumps and lumps and has cellulitis. Call light in reach. Patient ate well for breakfast.

## 2025-05-13 NOTE — PLAN OF CARE
Goal Outcome Evaluation:       Heart Failure Care Map  GOALS TO BE MET BEFORE DISCHARGE:    1. Decrease congestion and/or edema with diuretic therapy to achieve near optimal volume status.     Dyspnea improved: Yes, satisfactory for discharge.   Edema improved: No, further care required to meet this goal. Please explain Patient still exhibiting edema in lower extremities        Last 24 hour I/O:   Intake/Output Summary (Last 24 hours) at 5/12/2025 2239  Last data filed at 5/12/2025 2210  Gross per 24 hour   Intake 670.1 ml   Output 6025 ml   Net -5354.9 ml           Net I/O and Weights since admission:   04/12 2300 - 05/12 2259  In: 4523.56 [P.O.:3910; I.V.:613.56]  Out: 97701 [Urine:67395]  Net: -9126.44     Vitals:    05/09/25 0825 05/11/25 0344 05/12/25 0350   Weight: 134.2 kg (295 lb 13.7 oz) 130.2 kg (287 lb 0.6 oz) (!) 139.1 kg (306 lb 10.6 oz)       2.  O2 sats > 90% on room air, or at prior home O2 therapy level.      Able to wean O2 this shift to keep sats above 90%?: Yes, satisfactory for discharge.   Does patient use Home O2? Yes-  3L          Current oxygenation status:   SpO2: 94 %     O2 Device: Nasal cannula, Oxygen Delivery: 3 LPM    3.  Tolerates ambulation and mobility near baseline.     Ambulation: No, further care required to meet this goal. Please explain Patient still not tolerating ambulation well   Times patient ambulated with staff this shift: 0    Patient is A/Ox4, on 3L NC during day and Bipap @ night, and assist x2 with cares. Patient currently on period, so urine appears bloody.    Dobutamin drip running per orders, 1.25mcg/kg/min, 5mL/hr    Fabian Ellison RN

## 2025-05-13 NOTE — PROGRESS NOTES
[facility]    Medicine Progress Note - Hospitalist Service    Date of Admission:  5/8/2025    Assessment & Plan   49-year-old female with pulmonary HTN, chronic respiratory failure and recent DVT presented with shortness of breath and weakness and found to be septic and hypoxic with LLE cellulitis and in decompensated heart failure.    #Severe sepsis with endorgan dysfunction involving lactic acidosis, worsened respiratory failure  #Gram-negative bacteremia  #Cellulitis, LLE  #Recent shingles, L flank  #Candida intertrigo  Blood culture grew pan-sensitive Pseudomonas  MRSA screen negative  S/p IV fluid hydration 2.5L  CT showed no abscess  ID consulted- stopped micafungin and zosyn, switched to PO levaquin x7 days, keeping PO vanc x12 days    #Acute on chronic HFpEF  #Critical pulmonary hypertension  #Acute on chronic respiratory failure with hypoxia and hypercapnia  #Hypokalemia 2/2 loop diuretic  S/p 10 L oximask, BiPAP, now weaned to nasal cannula  Cardiology consulted: lasix gtt switched to IV Q8, continued dobutamine gtt  Holding PTA spironolactone, Bumex  CTA chest shows no PE  Replacing K PRN    #Recent DVT, LLE  #Chronic lymphedema  PTA Eliquis  Lymph team consulted    #Prediabetes  #Hypoglycemia, resolved  Stopped SSI and Accu-Cheks    #Morbid obesity  BMI 50      VTE PPx: DOAC          Diet: Low Saturated Fat Na <2400 mg  Fluid restriction 1800 ML FLUID    Brar Catheter: Not present  Lines: None     Cardiac Monitoring: ACTIVE order. Indication: Acute decompensated heart failure (48 hours)  Code Status: Full Code      Clinically Significant Risk Factors        # Hypokalemia: Lowest K = 3.1 mmol/L in last 2 days, will replace as needed            # Hypertension: Noted on problem list  # Acute heart failure with preserved ejection fraction: heart failure noted on problem list, last echo with EF >50%, and receiving IV diuretics           # Morbid Obesity: Estimated body mass index is  "52.64 kg/m  as calculated from the following:    Height as of this encounter: 1.626 m (5' 4\").    Weight as of this encounter: 139.1 kg (306 lb 10.6 oz).      # Financial/Environmental Concerns: none         Social Drivers of Health    Food Insecurity: Low Risk  (5/8/2025)    Food Insecurity     Within the past 12 months, did you worry that your food would run out before you got money to buy more?: No     Within the past 12 months, did the food you bought just not last and you didn t have money to get more?: No   Recent Concern: Food Insecurity - High Risk (3/23/2025)    Food Insecurity     Within the past 12 months, did you worry that your food would run out before you got money to buy more?: Yes     Within the past 12 months, did the food you bought just not last and you didn t have money to get more?: No   Housing Stability: High Risk (5/8/2025)    Housing Stability     Do you have housing? : No     Are you worried about losing your housing?: No   Transportation Needs: High Risk (5/8/2025)    Transportation Needs     Within the past 12 months, has lack of transportation kept you from medical appointments, getting your medicines, non-medical meetings or appointments, work, or from getting things that you need?: Yes   Physical Activity: Inactive (3/21/2024)    Exercise Vital Sign     Days of Exercise per Week: 0 days     Minutes of Exercise per Session: 0 min   Interpersonal Safety: Low Risk  (5/8/2025)    Interpersonal Safety     Do you feel physically and emotionally safe where you currently live?: Yes     Within the past 12 months, have you been hit, slapped, kicked or otherwise physically hurt by someone?: No     Within the past 12 months, have you been humiliated or emotionally abused in other ways by your partner or ex-partner?: No   Recent Concern: Interpersonal Safety - High Risk (3/23/2025)    Interpersonal Safety     Do you feel physically and emotionally safe where you currently live?: No     Within the " past 12 months, have you been hit, slapped, kicked or otherwise physically hurt by someone?: No     Within the past 12 months, have you been humiliated or emotionally abused in other ways by your partner or ex-partner?: No   Social Connections: Unknown (3/1/2022)    Social Connection and Isolation Panel [NHANES]     Frequency of Communication with Friends and Family: Twice a week     Frequency of Social Gatherings with Friends and Family: Twice a week          Disposition Plan     Medically Ready for Discharge: Anticipated in 2-4 Days             Michael Cabrera DO  Hospitalist Service  [facility]  Securely message with [email or web address] (more info)  Text page via [email or web address] Paging/Directory   ______________________________________________________________________    Interval History   Feeling a little better    Physical Exam   Vital Signs: Temp: 97.7  F (36.5  C) Temp src: Oral BP: 119/79 Pulse: 74   Resp: 20 SpO2: 95 % O2 Device: Nasal cannula Oxygen Delivery: 3 LPM  Weight: 306 lbs 10.56 oz    General Appearance:  No acute distress  Respiratory: Clear to auscultation bilaterally  Cardiovascular: Regular rate and rhythm  Neuro: Alert and oriented x 3, normal speech    Medical Decision Making       50 MINUTES SPENT BY ME on the date of service doing chart review, history, exam, documentation & further activities per the note.      Data

## 2025-05-13 NOTE — PROGRESS NOTES
HEART CARE NOTE          Assessment/Recommendations     1. HFpEF/RV dysfunction c/b cardiogenic shock  Assessment / Plan  Hypervolemic on physical exam with good response to current IV diuretic strategy - no changes at this time; continue to monitor UOP and renal function closely   Continue dobutamine gtt for hemodynamic support  Patient is at increased risk for adverse cardiac events 2/2 non-compliance, morbid obesity, renal dysfunction     2. HUNG in CKD  Assessment / Plan  Resolving; Follows with [facility]  Diuresis as above; renal function wnl; continue to monitor UOP and renal function closely     3. HTN  Assessment / Plan   Adequately controlled- borderline BP - no additional recommendations at this time     4. NARCISA  Assessment / Plan  CPAP at bedtime     5. PE c/b Core pulmonale + pulmonary HTN  Assessment / Plan  Hx of PE; currently on apixaban  C/b Core pulmonale; pulm Htn likely multifactorial given hx of PE and current WHO Group 2 2/2 volume overload - plans for RHC this admission once near euvolemia    Plan of care discussed on May 13, 2025 with patient at bedside, and primary team overseeing patient's care    History of Present Illness/Subjective    Ms. Bess Enamorado is a 49 year old female with a PMHx significant for (per Epic notation) Chronic Hypoxic Respiratory Failure (on 3L NC at home), Pulmonary HTN, Chronic CHF who was admitted on 5/8/25 after presenting to [facility] ED for Sepsis with Hypoxia.      Today, Mrs. Enamorado denies acute cardiac events or complaints; Management plan as detailed above     ECG: personally reviewed; normal sinus rhythm, nonspecific ST and T waves changes, incomplete RBBB.     ECHO (personnaly Reviewed on 5/12/25):  1. The left ventricle is normal in size. Image quality does not provide for  detailed assessment of LV systolic function, but is felt to be normal with a  visually estimated ejection fraction of roughly 65-70%.  2. There is mild calcific mitral  "stenosis.  3. There is prominent right ventricular enlargement with reduced right  ventricular systolic performance though difficult to quantify due to  suboptimal acoustic imaging.  4. There is biatrial enlargement though difficult to quantify due to  suboptimal acoustic imaging.  5. Right ventricular systolic pressure relative to right atrial pressure is  severely increased. The pulmonary artery pressure is estimated to be   mmHg plus right atrial pressure (the IVC is mildly dilated).     The acoustic quality of this study is very poor. If a more accurate assessment  of left ventricular systolicperformance, regional wall motion, and other  morphology/function is required; would recommend cardiac MRI or other  alternative imaging modality for further evaluation.     When compared to the prior real-time echocardiogram dated 12 February 2025,  there has been a further increase in the pulmonary artery pressure estimate.    Telemetry: personally reviewed May 13, 2025; notable for sinus rhythm     Lab results: personally reviewed May 13, 2025; notable for renal function wnl    Medical history and pertinent documents reviewed in Care Everywhere please where applicable see details above        Physical Examination Review of Systems   /68 (BP Location: Left arm)   Pulse 78   Temp 98.2  F (36.8  C) (Axillary)   Resp 20   Ht 1.626 m (5' 4\")   Wt (!) 139.1 kg (306 lb 10.6 oz)   LMP 04/08/2025 (Within Days)   SpO2 94%   BMI 52.64 kg/m    Body mass index is 52.64 kg/m .  Wt Readings from Last 3 Encounters:   05/12/25 (!) 139.1 kg (306 lb 10.6 oz)   04/15/25 116.9 kg (257 lb 12.8 oz)   04/03/25 123.4 kg (272 lb 0.8 oz)     General Appearance:   no distress, normal body habitus   ENT/Mouth: membranes moist, no oral lesions or bleeding gums.      EYES:  no scleral icterus, normal conjunctivae   Neck: no carotid bruits or thyromegaly   Chest/Lungs:   lungs are clear to auscultation, no rales or wheezing, equal " chest wall expansion    Cardiovascular:   Regular. Normal first and second heart sounds with no murmurs, rubs, or gallops; the carotid, radial and posterior tibial pulses are intact, + JVD    Abdomen:  no organomegaly, masses, bruits, or tenderness; bowel sounds are present   Extremities: no cyanosis or clubbing   Skin: no xanthelasma, warm.    Neurologic: NAD     Psychiatric: alert and oriented x3, calm     A complete 10 systems ROS was reviewed  And is negative except what is listed in the HPI.          Medical History  Surgical History Family History Social History   Past Medical History:   Diagnosis Date    Acute on chronic diastolic congestive heart failure (H) 02/09/2022    Arthritis 2019    Hips    ASCUS favor benign 08/2015    Neg HPV    Deep vein thrombosis (DVT) of left lower extremity (H) 03/25/2025    Depressive disorder 2010    H/O colposcopy with cervical biopsy 09/14/2015    Bx & ECC - negative    Hypertension 2019    LSIL on Pap smear 07/2014    Neg high risk HPV    Multiple sclerosis (H) 08/29/2005 9/18/200pt dx with MS 2004--dx by neurolgist and is followed by Dr. Harris    Myocardial infarction (H)     Obese     Pneumonia due to infectious organism, unspecified laterality, unspecified part of lung 02/08/2022    Sepsis (Temp 101, , WBC 13.0) 10/23/2018    Shingles 10/2016    SIRS (systemic inflammatory response syndrome) (H) 03/04/2021    Sleep apnea     UNSPEC CONSTIPATION 09/18/2006 9/18/2006   Has tried otc suppository and otc lax.     Past Surgical History:   Procedure Laterality Date    CHOLECYSTECTOMY, LAPOROSCOPIC      Cholecystectomy, Laparoscopic    COLONOSCOPY  2007?    Bathroom issue..results normal    COMBINED CYSTOSCOPY, RETROGRADES, EXCHANGE STENT URETER(S) Right 2/21/2022    Procedure: CYSTOSCOPY, WITH right RETROGRADE PYELOGRAM AND right URETERAL STENT PLACEMENT;  Surgeon: Doyle Palomares MD;  [place] [facility] [place] REDUCTION INTERNAL FIXATION TOE(S)   4/13/2012    Procedure:OPEN REDUCTION INTERNAL FIXATION TOE(S); Open reduction internal fixation right proximal fifth metatarsal fracture-   Anes-choice block; Surgeon:LEY, JEFFREY DUANE; Location:[place]    PICC SINGLE LUMEN PLACEMENT  3/20/2024    PICC TRIPLE LUMEN PLACEMENT  2/21/2022         no family history of premature coronary artery disease Social History     Socioeconomic History    Marital status: Single     Spouse name: Not on file    Number of children: Not on file    Years of education: Not on file    Highest education level: Not on file   Occupational History     Employer: [employer]   Tobacco Use    Smoking status: Never    Smokeless tobacco: Never   Vaping Use    Vaping status: Never Used   Substance and Sexual Activity    Alcohol use: Yes     Comment: social    Drug use: No    Sexual activity: Not Currently     Partners: Male     Birth control/protection: Pill   Other Topics Concern    Parent/sibling w/ CABG, MI or angioplasty before 65F 55M? No   Social History Narrative    , 3rd-5th grade     Social Drivers of Health     Financial Resource Strain: Low Risk  (5/8/2025)    Financial Resource Strain     Within the past 12 months, have you or your family members you live with been unable to get utilities (heat, electricity) when it was really needed?: No   Food Insecurity: Low Risk  (5/8/2025)    Food Insecurity     Within the past 12 months, did you worry that your food would run out before you got money to buy more?: No     Within the past 12 months, did the food you bought just not last and you didn t have money to get more?: No   Recent Concern: Food Insecurity - High Risk (3/23/2025)    Food Insecurity     Within the past 12 months, did you worry that your food would run out before you got money to buy more?: Yes     Within the past 12 months, did the food you bought just not last and you didn t have money to get more?: No   Transportation Needs: High Risk (5/8/2025)     Transportation Needs     Within the past 12 months, has lack of transportation kept you from medical appointments, getting your medicines, non-medical meetings or appointments, work, or from getting things that you need?: Yes   Physical Activity: Inactive (3/21/2024)    Exercise Vital Sign     Days of Exercise per Week: 0 days     Minutes of Exercise per Session: 0 min   Stress: No Stress Concern Present (12/4/2020)    [employer] - Occupational Stress Questionnaire     Feeling of Stress : Only a little   Social Connections: Unknown (3/1/2022)    Social Connection and Isolation Panel [NHANES]     Frequency of Communication with Friends and Family: Twice a week     Frequency of Social Gatherings with Friends and Family: Twice a week     Attends Confucianism Services: Not on file     Active Member of Clubs or Organizations: Not on file     Attends Club or Organization Meetings: Not on file     Marital Status: Not on file   Interpersonal Safety: Low Risk  (5/8/2025)    Interpersonal Safety     Do you feel physically and emotionally safe where you currently live?: Yes     Within the past 12 months, have you been hit, slapped, kicked or otherwise physically hurt by someone?: No     Within the past 12 months, have you been humiliated or emotionally abused in other ways by your partner or ex-partner?: No   Recent Concern: Interpersonal Safety - High Risk (3/23/2025)    Interpersonal Safety     Do you feel physically and emotionally safe where you currently live?: No     Within the past 12 months, have you been hit, slapped, kicked or otherwise physically hurt by someone?: No     Within the past 12 months, have you been humiliated or emotionally abused in other ways by your partner or ex-partner?: No   Housing Stability: High Risk (5/8/2025)    Housing Stability     Do you have housing? : No     Are you worried about losing your housing?: No           Lab Results    Chemistry/lipid CBC Cardiac  Enzymes/BNP/TSH/INR   Lab Results   Component Value Date    CHOL 81 02/12/2025    HDL 11 (L) 02/12/2025    TRIG 134 02/12/2025    BUN 11.4 05/13/2025     05/13/2025    CO2 25 05/13/2025    Lab Results   Component Value Date    WBC 5.6 05/12/2025    HGB 12.2 05/12/2025    HCT 37.7 05/12/2025    MCV 90 05/12/2025     05/12/2025    Lab Results   Component Value Date    TROPONINI 0.03 04/06/2022     (H) 04/06/2022    TSH 3.52 02/12/2025    INR 1.30 (H) 03/23/2025     Lab Results   Component Value Date    TROPONINI 0.03 04/06/2022          Weight:    Wt Readings from Last 3 Encounters:   05/12/25 (!) 139.1 kg (306 lb 10.6 oz)   04/15/25 116.9 kg (257 lb 12.8 oz)   04/03/25 123.4 kg (272 lb 0.8 oz)       Allergies  No Known Allergies      Surgical History  Past Surgical History:   Procedure Laterality Date    CHOLECYSTECTOMY, LAPOROSCOPIC      Cholecystectomy, Laparoscopic    COLONOSCOPY  2007?    Bathroom issue..results normal    COMBINED CYSTOSCOPY, RETROGRADES, EXCHANGE STENT URETER(S) Right 2/21/2022    Procedure: CYSTOSCOPY, WITH right RETROGRADE PYELOGRAM AND right URETERAL STENT PLACEMENT;  Surgeon: Doyle Palomares MD;  [place] [facility] [place]    OPEN REDUCTION INTERNAL FIXATION TOE(S)  4/13/2012    Procedure:OPEN REDUCTION INTERNAL FIXATION TOE(S); Open reduction internal fixation right proximal fifth metatarsal fracture-   Anes-choice block; Surgeon:LEY, JEFFREY DUANE; Location:[place]    PICC SINGLE LUMEN PLACEMENT  3/20/2024    PICC TRIPLE LUMEN PLACEMENT  2/21/2022            Social History  Tobacco:   History   Smoking Status    Never   Smokeless Tobacco    Never    Alcohol:   Social History    Substance and Sexual Activity      Alcohol use: Yes        Comment: social   Illicit Drugs:   History   Drug Use No       Family History  Family History   Problem Relation Age of Onset    Neurologic Disorder Father         MS    Heart Disease Father         irregular heart rate    Diabetes Father      Melanoma Father     Sleep Apnea Father     Other Cancer Father     Cancer Maternal Grandfather         lung    Other Cancer Maternal Grandfather     Cancer Paternal Grandmother         stomach    Other Cancer Paternal Grandmother     Cancer Mother     Other Cancer Mother     Thyroid Disease Mother     Gynecology Maternal Aunt         PCOS    Hypertension Maternal Grandmother     Anxiety Disorder Maternal Grandmother     Thyroid Disease Maternal Grandmother     Anxiety Disorder Sister           Raúl Sanchez MD on 5/13/2025      cc: Mikala Luna

## 2025-05-14 LAB
ANION GAP SERPL CALCULATED.3IONS-SCNC: 7 MMOL/L (ref 7–15)
BASOPHILS # BLD AUTO: 0.1 10E3/UL (ref 0–0.2)
BASOPHILS NFR BLD AUTO: 1 %
BUN SERPL-MCNC: 12.2 MG/DL (ref 6–20)
CALCIUM SERPL-MCNC: 9.9 MG/DL (ref 8.8–10.4)
CHLORIDE SERPL-SCNC: 103 MMOL/L (ref 98–107)
CREAT SERPL-MCNC: 0.7 MG/DL (ref 0.51–0.95)
EGFRCR SERPLBLD CKD-EPI 2021: >90 ML/MIN/1.73M2
EOSINOPHIL # BLD AUTO: 0.3 10E3/UL (ref 0–0.7)
EOSINOPHIL NFR BLD AUTO: 6 %
ERYTHROCYTE [DISTWIDTH] IN BLOOD BY AUTOMATED COUNT: 16.2 % (ref 10–15)
GLUCOSE SERPL-MCNC: 88 MG/DL (ref 70–99)
HCO3 SERPL-SCNC: 28 MMOL/L (ref 22–29)
HCT VFR BLD AUTO: 39.2 % (ref 35–47)
HGB BLD-MCNC: 12.8 G/DL (ref 11.7–15.7)
IMM GRANULOCYTES # BLD: 0 10E3/UL
IMM GRANULOCYTES NFR BLD: 1 %
LYMPHOCYTES # BLD AUTO: 1.4 10E3/UL (ref 0.8–5.3)
LYMPHOCYTES NFR BLD AUTO: 26 %
MCH RBC QN AUTO: 29.4 PG (ref 26.5–33)
MCHC RBC AUTO-ENTMCNC: 32.7 G/DL (ref 31.5–36.5)
MCV RBC AUTO: 90 FL (ref 78–100)
MONOCYTES # BLD AUTO: 0.6 10E3/UL (ref 0–1.3)
MONOCYTES NFR BLD AUTO: 11 %
NEUTROPHILS # BLD AUTO: 2.8 10E3/UL (ref 1.6–8.3)
NEUTROPHILS NFR BLD AUTO: 55 %
NRBC # BLD AUTO: 0 10E3/UL
NRBC BLD AUTO-RTO: 0 /100
PLATELET # BLD AUTO: 171 10E3/UL (ref 150–450)
POTASSIUM SERPL-SCNC: 3.5 MMOL/L (ref 3.4–5.3)
RBC # BLD AUTO: 4.35 10E6/UL (ref 3.8–5.2)
SODIUM SERPL-SCNC: 138 MMOL/L (ref 135–145)
WBC # BLD AUTO: 5.2 10E3/UL (ref 4–11)

## 2025-05-14 PROCEDURE — 999N000157 HC STATISTIC RCP TIME EA 10 MIN

## 2025-05-14 PROCEDURE — 85004 AUTOMATED DIFF WBC COUNT: CPT | Performed by: HOSPITALIST

## 2025-05-14 PROCEDURE — 99233 SBSQ HOSP IP/OBS HIGH 50: CPT | Performed by: HOSPITALIST

## 2025-05-14 PROCEDURE — 82374 ASSAY BLOOD CARBON DIOXIDE: CPT | Performed by: HOSPITALIST

## 2025-05-14 PROCEDURE — 36415 COLL VENOUS BLD VENIPUNCTURE: CPT | Performed by: HOSPITALIST

## 2025-05-14 PROCEDURE — 250N000011 HC RX IP 250 OP 636: Performed by: INTERNAL MEDICINE

## 2025-05-14 PROCEDURE — 99291 CRITICAL CARE FIRST HOUR: CPT | Performed by: INTERNAL MEDICINE

## 2025-05-14 PROCEDURE — 250N000013 HC RX MED GY IP 250 OP 250 PS 637: Performed by: HOSPITALIST

## 2025-05-14 PROCEDURE — 250N000013 HC RX MED GY IP 250 OP 250 PS 637: Performed by: INTERNAL MEDICINE

## 2025-05-14 PROCEDURE — 250N000013 HC RX MED GY IP 250 OP 250 PS 637: Performed by: STUDENT IN AN ORGANIZED HEALTH CARE EDUCATION/TRAINING PROGRAM

## 2025-05-14 PROCEDURE — 94660 CPAP INITIATION&MGMT: CPT

## 2025-05-14 PROCEDURE — P9047 ALBUMIN (HUMAN), 25%, 50ML: HCPCS | Performed by: INTERNAL MEDICINE

## 2025-05-14 PROCEDURE — 210N000001 HC R&B IMCU HEART CARE

## 2025-05-14 RX ORDER — POTASSIUM CHLORIDE 1500 MG/1
20 TABLET, EXTENDED RELEASE ORAL 2 TIMES DAILY
Status: DISCONTINUED | OUTPATIENT
Start: 2025-05-14 | End: 2025-05-19 | Stop reason: HOSPADM

## 2025-05-14 RX ORDER — ALBUMIN (HUMAN) 12.5 G/50ML
50 SOLUTION INTRAVENOUS ONCE
Status: COMPLETED | OUTPATIENT
Start: 2025-05-14 | End: 2025-05-14

## 2025-05-14 RX ORDER — SPIRONOLACTONE 25 MG/1
25 TABLET ORAL DAILY
Status: DISCONTINUED | OUTPATIENT
Start: 2025-05-14 | End: 2025-05-19 | Stop reason: HOSPADM

## 2025-05-14 RX ADMIN — ASPIRIN 81 MG: 81 TABLET, COATED ORAL at 08:19

## 2025-05-14 RX ADMIN — POTASSIUM CHLORIDE 20 MEQ: 1500 TABLET, EXTENDED RELEASE ORAL at 21:50

## 2025-05-14 RX ADMIN — LEVOFLOXACIN 750 MG: 750 TABLET, FILM COATED ORAL at 13:44

## 2025-05-14 RX ADMIN — SPIRONOLACTONE 25 MG: 25 TABLET, FILM COATED ORAL at 08:20

## 2025-05-14 RX ADMIN — ESCITALOPRAM OXALATE 10 MG: 10 TABLET ORAL at 08:20

## 2025-05-14 RX ADMIN — APIXABAN 5 MG: 5 TABLET, FILM COATED ORAL at 08:20

## 2025-05-14 RX ADMIN — VANCOMYCIN HYDROCHLORIDE 125 MG: 125 CAPSULE ORAL at 08:20

## 2025-05-14 RX ADMIN — FUROSEMIDE 80 MG: 10 INJECTION, SOLUTION INTRAMUSCULAR; INTRAVENOUS at 08:19

## 2025-05-14 RX ADMIN — FUROSEMIDE 80 MG: 10 INJECTION, SOLUTION INTRAMUSCULAR; INTRAVENOUS at 21:50

## 2025-05-14 RX ADMIN — MICONAZOLE NITRATE: 20 POWDER TOPICAL at 08:20

## 2025-05-14 RX ADMIN — DOBUTAMINE IN DEXTROSE 1.25 MCG/KG/MIN: 200 INJECTION, SOLUTION INTRAVENOUS at 08:26

## 2025-05-14 RX ADMIN — FUROSEMIDE 80 MG: 10 INJECTION, SOLUTION INTRAMUSCULAR; INTRAVENOUS at 13:42

## 2025-05-14 RX ADMIN — APIXABAN 5 MG: 5 TABLET, FILM COATED ORAL at 21:50

## 2025-05-14 RX ADMIN — EMPAGLIFLOZIN 10 MG: 10 TABLET, FILM COATED ORAL at 08:22

## 2025-05-14 RX ADMIN — MICONAZOLE NITRATE: 20 POWDER TOPICAL at 21:51

## 2025-05-14 RX ADMIN — SIMVASTATIN 10 MG: 10 TABLET, FILM COATED ORAL at 21:50

## 2025-05-14 RX ADMIN — ALBUMIN HUMAN 50 G: 0.25 SOLUTION INTRAVENOUS at 06:03

## 2025-05-14 RX ADMIN — POTASSIUM CHLORIDE 20 MEQ: 1500 TABLET, EXTENDED RELEASE ORAL at 10:12

## 2025-05-14 ASSESSMENT — ACTIVITIES OF DAILY LIVING (ADL)
ADLS_ACUITY_SCORE: 66
ADLS_ACUITY_SCORE: 62
ADLS_ACUITY_SCORE: 66
ADLS_ACUITY_SCORE: 66
ADLS_ACUITY_SCORE: 62
ADLS_ACUITY_SCORE: 72
ADLS_ACUITY_SCORE: 62
ADLS_ACUITY_SCORE: 66
ADLS_ACUITY_SCORE: 72
ADLS_ACUITY_SCORE: 68
ADLS_ACUITY_SCORE: 66
ADLS_ACUITY_SCORE: 72
ADLS_ACUITY_SCORE: 62
ADLS_ACUITY_SCORE: 67
ADLS_ACUITY_SCORE: 68
ADLS_ACUITY_SCORE: 62
ADLS_ACUITY_SCORE: 67
ADLS_ACUITY_SCORE: 62
ADLS_ACUITY_SCORE: 72
ADLS_ACUITY_SCORE: 62
ADLS_ACUITY_SCORE: 62

## 2025-05-14 NOTE — PROGRESS NOTES
[facility]    Medicine Progress Note - Hospitalist Service    Date of Admission:  5/8/2025    Assessment & Plan   49-year-old female with pulmonary HTN, chronic respiratory failure and recent DVT presented with shortness of breath and weakness and found to be septic and hypoxic with LLE cellulitis and decompensated heart failure.  Has been transitioned to PO abx but still on IV lasix and dobutamine gtt.    #Severe sepsis with endorgan dysfunction involving lactic acidosis, worsened respiratory failure  #Gram-negative bacteremia  #Cellulitis, LLE  #Recent shingles, L flank  #Candida intertrigo  Blood culture grew pan-sensitive Pseudomonas  MRSA screen negative  S/p IV fluid hydration 2.5L  CT showed no abscess  ID consulted- stopped micafungin and zosyn, switched to PO levaquin #2 of 7d, keeping PO vanc #2 of 12d    #Acute on chronic HFpEF  #Critical pulmonary hypertension  #Acute on chronic respiratory failure with hypoxia and hypercapnia  #Hypokalemia 2/2 loop diuretic  S/p 10 L oximask, BiPAP, now weaned to nasal cannula  Cardiology consulted: lasix gtt switched to IV Q8, continued dobutamine gtt  PTA spironolactone, Jardiance  CTA chest shows no PE  Scheduled K replacement while on IV lasix, monitor K level    #Recent DVT, LLE  #Chronic lymphedema  PTA Eliquis  Lymph team consulted    #Prediabetes  #Hypoglycemia, resolved  Stopped SSI and Accu-Cheks    #Morbid obesity  BMI 50      VTE PPx: DOAC          Diet: Low Saturated Fat Na <2400 mg  Fluid restriction 1800 ML FLUID    Brar Catheter: Not present  Lines: None     Cardiac Monitoring: ACTIVE order. Indication: Acute decompensated heart failure (48 hours)  Code Status: Full Code      Clinically Significant Risk Factors        # Hypokalemia: Lowest K = 3.1 mmol/L in last 2 days, will replace as needed            # Hypertension: Noted on problem list  # Acute heart failure with preserved ejection fraction: heart failure noted on problem  "list, last echo with EF >50%, and receiving IV diuretics           # Morbid Obesity: Estimated body mass index is 52.64 kg/m  as calculated from the following:    Height as of this encounter: 1.626 m (5' 4\").    Weight as of this encounter: 139.1 kg (306 lb 10.6 oz).      # Financial/Environmental Concerns: none         Social Drivers of Health    Food Insecurity: Low Risk  (5/8/2025)    Food Insecurity     Within the past 12 months, did you worry that your food would run out before you got money to buy more?: No     Within the past 12 months, did the food you bought just not last and you didn t have money to get more?: No   Recent Concern: Food Insecurity - High Risk (3/23/2025)    Food Insecurity     Within the past 12 months, did you worry that your food would run out before you got money to buy more?: Yes     Within the past 12 months, did the food you bought just not last and you didn t have money to get more?: No   Housing Stability: High Risk (5/8/2025)    Housing Stability     Do you have housing? : No     Are you worried about losing your housing?: No   Transportation Needs: High Risk (5/8/2025)    Transportation Needs     Within the past 12 months, has lack of transportation kept you from medical appointments, getting your medicines, non-medical meetings or appointments, work, or from getting things that you need?: Yes   Physical Activity: Inactive (3/21/2024)    Exercise Vital Sign     Days of Exercise per Week: 0 days     Minutes of Exercise per Session: 0 min   Interpersonal Safety: Low Risk  (5/8/2025)    Interpersonal Safety     Do you feel physically and emotionally safe where you currently live?: Yes     Within the past 12 months, have you been hit, slapped, kicked or otherwise physically hurt by someone?: No     Within the past 12 months, have you been humiliated or emotionally abused in other ways by your partner or ex-partner?: No   Recent Concern: Interpersonal Safety - High Risk (3/23/2025)    " Interpersonal Safety     Do you feel physically and emotionally safe where you currently live?: No     Within the past 12 months, have you been hit, slapped, kicked or otherwise physically hurt by someone?: No     Within the past 12 months, have you been humiliated or emotionally abused in other ways by your partner or ex-partner?: No   Social Connections: Unknown (3/1/2022)    Social Connection and Isolation Panel [NHANES]     Frequency of Communication with Friends and Family: Twice a week     Frequency of Social Gatherings with Friends and Family: Twice a week          Disposition Plan     Medically Ready for Discharge: Anticipated in 2-4 Days  DC barrier: IV lasix, dobutamine gtt           Michael Cabrera DO  Hospitalist Service  [facility]  Securely message with [employer] (more info)  Text page via [email or web address] Paging/Directory   ______________________________________________________________________    Interval History   No acute events overnight    Physical Exam   Vital Signs: Temp: 97.2  F (36.2  C) Temp src: Axillary BP: 125/85 Pulse: 71   Resp: 24 SpO2: 95 % O2 Device: Nasal cannula Oxygen Delivery: 3 LPM  Weight: 306 lbs 10.56 oz    General Appearance:  No acute distress  Respiratory: Clear to auscultation bilaterally  Cardiovascular: Regular rate and rhythm      Medical Decision Making       50 MINUTES SPENT BY ME on the date of service doing chart review, history, exam, documentation & further activities per the note.      Data

## 2025-05-14 NOTE — PROGRESS NOTES
Heart Failure Care Map  GOALS TO BE MET BEFORE DISCHARGE:    1. Decrease congestion and/or edema with diuretic therapy to achieve near optimal volume status.2     Dyspnea improved  Yes, satisfactory for discharge.   Edema improved: No, further care required to meet this goal. Please explain Still has edema in lower extremities, diurese with IV lasix . On dobutamine gtt as ordered.         Last 24 hour I/O:   Intake/Output Summary (Last 24 hours) at 5/14/2025 1112  Last data filed at 5/14/2025 1100  Gross per 24 hour   Intake --   Output 8600 ml   Net -8600 ml           Net I/O and Weights since admission:   04/14 1500 - 05/14 1459  In: 4763.56 [P.O.:4150; I.V.:613.56]  Out: 07870 [Urine:16572]  Net: -73626.44     Vitals:    05/09/25 0825 05/11/25 0344 05/12/25 0350   Weight: 134.2 kg (295 lb 13.7 oz) 130.2 kg (287 lb 0.6 oz) (!) 139.1 kg (306 lb 10.6 oz)       2.  O2 sats > 90% on room air, or at prior home O2 therapy level.      Able to wean O2 this shift to keep sats above 90%?: No, further care required to meet this goal. Please explain wears home O2 at 3 liters per n/c at home and here in the hospital.   Does patient use Home O2? Yes-            Current oxygenation status:   SpO2: 95 %     O2 Device: Nasal cannula, Oxygen Delivery: 3 LPM    3.  Tolerates ambulation and mobility near baseline.     Ambulation: No, further care required to meet this goal. Please explain Patient rests in bed and doesn't like to get up when she had lasix IV given since she has to void so much.    Times patient ambulated with staff this shift: 0    Please review the Heart Failure Care Map for additional HF goal outcomes.  Continue to diurese, replace low potassium lab results.NSR with 1st degree and BBB. PT/OT ordered. Cardiologist in to see the patient and discuss plan of care, Call light in reach  Pretty Chambers RN  5/14/2025

## 2025-05-14 NOTE — PROGRESS NOTES
HEART CARE NOTE          Assessment/Recommendations     1. HFpEF/RV dysfunction c/b cardiogenic shock  Assessment / Plan  Continues to have robust UOP on current diuretic regimen - no changes at this time; continue to monitor UOP and renal function closely; give IV albumin bolus  Continue dobutamine gtt for hemodynamic support  Patient is at increased risk for adverse cardiac events 2/2 non-compliance, morbid obesity, renal dysfunction     2. HUNG in CKD stage 3  Assessment / Plan  Resolving; Follows with [facility]  Diuresis as above; renal function wnl; continue to monitor UOP and renal function closely     3. HTN  Assessment / Plan   Adequately controlled- borderline BP - no additional recommendations at this time     4. NARCISA  Assessment / Plan  CPAP at bedtime     5. PE c/b Core pulmonale + pulmonary HTN  Assessment / Plan  Hx of PE; currently on apixaban  C/b Core pulmonale; pulm Htn likely multifactorial given hx of PE and current WHO Group 2 2/2 volume overload - plans for RHC this admission once near euvolemia    Patient remains critically ill requiring hemodynamic support via dobutamine in the setting of pulmHTN c/b cardiogenic shock. 50 minutes spent on critical care time    History of Present Illness/Subjective    Ms. Bess Enamorado is a 49 year old female with a PMHx significant for (per Epic notation) Chronic Hypoxic Respiratory Failure (on 3L NC at home), Pulmonary HTN, Chronic CHF who was admitted on 5/8/25 after presenting to [facility] ED for Sepsis with Hypoxia.      Today, Mrs. Enamorado denies acute cardiac events or complaints; Management plan as detailed above     ECG: personally reviewed; normal sinus rhythm, nonspecific ST and T waves changes, incomplete RBBB.     ECHO (personnaly Reviewed on 5/12/25):  1. The left ventricle is normal in size. Image quality does not provide for  detailed assessment of LV systolic function, but is felt to be normal with a  visually estimated ejection fraction of  "roughly 65-70%.  2. There is mild calcific mitral stenosis.  3. There is prominent right ventricular enlargement with reduced right  ventricular systolic performance though difficult to quantify due to  suboptimal acoustic imaging.  4. There is biatrial enlargement though difficult to quantify due to  suboptimal acoustic imaging.  5. Right ventricular systolic pressure relative to right atrial pressure is  severely increased. The pulmonary artery pressure is estimated to be   mmHg plus right atrial pressure (the IVC is mildly dilated).     The acoustic quality of this study is very poor. If a more accurate assessment  of left ventricular systolicperformance, regional wall motion, and other  morphology/function is required; would recommend cardiac MRI or other  alternative imaging modality for further evaluation.     When compared to the prior real-time echocardiogram dated 12 February 2025,  there has been a further increase in the pulmonary artery pressure estimate.    Lab results: personally reviewed May 14, 2025; notable for stable renal function, resolving hypokalemia    Medical history and pertinent documents reviewed in Care Everywhere please where applicable see details above        Physical Examination Review of Systems   /77 (BP Location: Left arm)   Pulse 68   Temp 97.1  F (36.2  C) (Axillary)   Resp 25   Ht 1.626 m (5' 4\")   Wt (!) 139.1 kg (306 lb 10.6 oz)   LMP 04/08/2025 (Within Days)   SpO2 96%   BMI 52.64 kg/m    Body mass index is 52.64 kg/m .  Wt Readings from Last 3 Encounters:   05/12/25 (!) 139.1 kg (306 lb 10.6 oz)   04/15/25 116.9 kg (257 lb 12.8 oz)   04/03/25 123.4 kg (272 lb 0.8 oz)     General Appearance:   no distress, normal body habitus   ENT/Mouth: membranes moist, no oral lesions or bleeding gums.      EYES:  no scleral icterus, normal conjunctivae   Neck: no carotid bruits or thyromegaly   Chest/Lungs:   lungs are clear to auscultation, no rales or wheezing, equal " chest wall expansion    Cardiovascular:   Regular. Normal first and second heart sounds with no murmurs, rubs, or gallops; the carotid, radial and posterior tibial pulses are intact, + JVD    Abdomen:  no organomegaly, masses, bruits, or tenderness; bowel sounds are present   Extremities: no cyanosis or clubbing   Skin: no xanthelasma, warm.    Neurologic: NAD     Psychiatric: alert and oriented x3, calm     A complete 10 systems ROS was reviewed  And is negative except what is listed in the HPI.          Medical History  Surgical History Family History Social History   Past Medical History:   Diagnosis Date    Acute on chronic diastolic congestive heart failure (H) 02/09/2022    Arthritis 2019    Hips    ASCUS favor benign 08/2015    Neg HPV    Deep vein thrombosis (DVT) of left lower extremity (H) 03/25/2025    Depressive disorder 2010    H/O colposcopy with cervical biopsy 09/14/2015    Bx & ECC - negative    Hypertension 2019    LSIL on Pap smear 07/2014    Neg high risk HPV    Multiple sclerosis (H) 08/29/2005 9/18/200pt dx with MS 2004--dx by neurolgist and is followed by Dr. Harris    Myocardial infarction (H)     Obese     Pneumonia due to infectious organism, unspecified laterality, unspecified part of lung 02/08/2022    Sepsis (Temp 101, , WBC 13.0) 10/23/2018    Shingles 10/2016    SIRS (systemic inflammatory response syndrome) (H) 03/04/2021    Sleep apnea     UNSPEC CONSTIPATION 09/18/2006 9/18/2006   Has tried otc suppository and otc lax.     Past Surgical History:   Procedure Laterality Date    CHOLECYSTECTOMY, LAPOROSCOPIC      Cholecystectomy, Laparoscopic    COLONOSCOPY  2007?    Bathroom issue..results normal    COMBINED CYSTOSCOPY, RETROGRADES, EXCHANGE STENT URETER(S) Right 2/21/2022    Procedure: CYSTOSCOPY, WITH right RETROGRADE PYELOGRAM AND right URETERAL STENT PLACEMENT;  Surgeon: Doyle Palomares MD;  [facility]    OPEN REDUCTION INTERNAL FIXATION TOE(S)   4/13/2012    Procedure:OPEN REDUCTION INTERNAL FIXATION TOE(S); Open reduction internal fixation right proximal fifth metatarsal fracture-   Anes-choice block; Surgeon:LEY, JEFFREY DUANE; Location:[facility] OR    PICC SINGLE LUMEN PLACEMENT  3/20/2024    PICC TRIPLE LUMEN PLACEMENT  2/21/2022         no family history of premature coronary artery disease Social History     Socioeconomic History    Marital status: Single     Spouse name: Not on file    Number of children: Not on file    Years of education: Not on file    Highest education level: Not on file   Occupational History     Employer: [employer]   Tobacco Use    Smoking status: Never    Smokeless tobacco: Never   Vaping Use    Vaping status: Never Used   Substance and Sexual Activity    Alcohol use: Yes     Comment: social    Drug use: No    Sexual activity: Not Currently     Partners: Male     Birth control/protection: Pill   Other Topics Concern    Parent/sibling w/ CABG, MI or angioplasty before 65F 55M? No   Social History Narrative    , 3rd-5th grade     Social Drivers of Health     Financial Resource Strain: Low Risk  (5/8/2025)    Financial Resource Strain     Within the past 12 months, have you or your family members you live with been unable to get utilities (heat, electricity) when it was really needed?: No   Food Insecurity: Low Risk  (5/8/2025)    Food Insecurity     Within the past 12 months, did you worry that your food would run out before you got money to buy more?: No     Within the past 12 months, did the food you bought just not last and you didn t have money to get more?: No   Recent Concern: Food Insecurity - High Risk (3/23/2025)    Food Insecurity     Within the past 12 months, did you worry that your food would run out before you got money to buy more?: Yes     Within the past 12 months, did the food you bought just not last and you didn t have money to get more?: No   Transportation Needs: High Risk (5/8/2025)     Transportation Needs     Within the past 12 months, has lack of transportation kept you from medical appointments, getting your medicines, non-medical meetings or appointments, work, or from getting things that you need?: Yes   Physical Activity: Inactive (3/21/2024)    Exercise Vital Sign     Days of Exercise per Week: 0 days     Minutes of Exercise per Session: 0 min   Stress: No Stress Concern Present (12/4/2020)    [employer] [place] [employer] - Occupational Stress Questionnaire     Feeling of Stress : Only a little   Social Connections: Unknown (3/1/2022)    Social Connection and Isolation Panel [NHANES]     Frequency of Communication with Friends and Family: Twice a week     Frequency of Social Gatherings with Friends and Family: Twice a week     Attends Anglican Services: Not on file     Active Member of Clubs or Organizations: Not on file     Attends Club or Organization Meetings: Not on file     Marital Status: Not on file   Interpersonal Safety: Low Risk  (5/8/2025)    Interpersonal Safety     Do you feel physically and emotionally safe where you currently live?: Yes     Within the past 12 months, have you been hit, slapped, kicked or otherwise physically hurt by someone?: No     Within the past 12 months, have you been humiliated or emotionally abused in other ways by your partner or ex-partner?: No   Recent Concern: Interpersonal Safety - High Risk (3/23/2025)    Interpersonal Safety     Do you feel physically and emotionally safe where you currently live?: No     Within the past 12 months, have you been hit, slapped, kicked or otherwise physically hurt by someone?: No     Within the past 12 months, have you been humiliated or emotionally abused in other ways by your partner or ex-partner?: No   Housing Stability: High Risk (5/8/2025)    Housing Stability     Do you have housing? : No     Are you worried about losing your housing?: No           Lab Results    Chemistry/lipid CBC Cardiac  Enzymes/BNP/TSH/INR   Lab Results   Component Value Date    CHOL 81 02/12/2025    HDL 11 (L) 02/12/2025    TRIG 134 02/12/2025    BUN 12.2 05/14/2025     05/14/2025    CO2 28 05/14/2025    Lab Results   Component Value Date    WBC 5.2 05/14/2025    HGB 12.8 05/14/2025    HCT 39.2 05/14/2025    MCV 90 05/14/2025     05/14/2025    Lab Results   Component Value Date    TROPONINI 0.03 04/06/2022     (H) 04/06/2022    TSH 3.52 02/12/2025    INR 1.30 (H) 03/23/2025     Lab Results   Component Value Date    TROPONINI 0.03 04/06/2022          Weight:    Wt Readings from Last 3 Encounters:   05/12/25 (!) 139.1 kg (306 lb 10.6 oz)   04/15/25 116.9 kg (257 lb 12.8 oz)   04/03/25 123.4 kg (272 lb 0.8 oz)       Allergies  No Known Allergies      Surgical History  Past Surgical History:   Procedure Laterality Date    CHOLECYSTECTOMY, LAPOROSCOPIC      Cholecystectomy, Laparoscopic    COLONOSCOPY  2007?    Bathroom issue..results normal    COMBINED CYSTOSCOPY, RETROGRADES, EXCHANGE STENT URETER(S) Right 2/21/2022    Procedure: CYSTOSCOPY, WITH right RETROGRADE PYELOGRAM AND right URETERAL STENT PLACEMENT;  Surgeon: Doyle Palomares MD;  Location: [facility]    OPEN REDUCTION INTERNAL FIXATION TOE(S)  4/13/2012    Procedure:OPEN REDUCTION INTERNAL FIXATION TOE(S); Open reduction internal fixation right proximal fifth metatarsal fracture-   Anes-choice block; Surgeon:LEY, JEFFREY DUANE; Location:[place] OR    PICC SINGLE LUMEN PLACEMENT  3/20/2024    PICC TRIPLE LUMEN PLACEMENT  2/21/2022            Social History  Tobacco:   History   Smoking Status    Never   Smokeless Tobacco    Never    Alcohol:   Social History    Substance and Sexual Activity      Alcohol use: Yes        Comment: social   Illicit Drugs:   History   Drug Use No       Family History  Family History   Problem Relation Age of Onset    Neurologic Disorder Father         MS    Heart Disease Father         irregular heart rate    Diabetes Father      Melanoma Father     Sleep Apnea Father     Other Cancer Father     Cancer Maternal Grandfather         lung    Other Cancer Maternal Grandfather     Cancer Paternal Grandmother         stomach    Other Cancer Paternal Grandmother     Cancer Mother     Other Cancer Mother     Thyroid Disease Mother     Gynecology Maternal Aunt         PCOS    Hypertension Maternal Grandmother     Anxiety Disorder Maternal Grandmother     Thyroid Disease Maternal Grandmother     Anxiety Disorder Sister           Raúl Sanchez MD on 5/14/2025      cc: Mikala Luna

## 2025-05-15 ENCOUNTER — APPOINTMENT (OUTPATIENT)
Dept: OCCUPATIONAL THERAPY | Facility: HOSPITAL | Age: 50
End: 2025-05-15
Payer: COMMERCIAL

## 2025-05-15 VITALS
DIASTOLIC BLOOD PRESSURE: 78 MMHG | SYSTOLIC BLOOD PRESSURE: 113 MMHG | HEIGHT: 64 IN | WEIGHT: 252.4 LBS | TEMPERATURE: 97.9 F | BODY MASS INDEX: 43.09 KG/M2 | RESPIRATION RATE: 18 BRPM | HEART RATE: 84 BPM | OXYGEN SATURATION: 95 %

## 2025-05-15 LAB
ANION GAP SERPL CALCULATED.3IONS-SCNC: 9 MMOL/L (ref 7–15)
BASOPHILS # BLD AUTO: 0.1 10E3/UL (ref 0–0.2)
BASOPHILS NFR BLD AUTO: 1 %
BUN SERPL-MCNC: 15.3 MG/DL (ref 6–20)
CALCIUM SERPL-MCNC: 10.3 MG/DL (ref 8.8–10.4)
CHLORIDE SERPL-SCNC: 101 MMOL/L (ref 98–107)
CREAT SERPL-MCNC: 0.77 MG/DL (ref 0.51–0.95)
EGFRCR SERPLBLD CKD-EPI 2021: >90 ML/MIN/1.73M2
EOSINOPHIL # BLD AUTO: 0.3 10E3/UL (ref 0–0.7)
EOSINOPHIL NFR BLD AUTO: 5 %
ERYTHROCYTE [DISTWIDTH] IN BLOOD BY AUTOMATED COUNT: 16.3 % (ref 10–15)
GLUCOSE SERPL-MCNC: 84 MG/DL (ref 70–99)
HCO3 SERPL-SCNC: 29 MMOL/L (ref 22–29)
HCT VFR BLD AUTO: 41.2 % (ref 35–47)
HGB BLD-MCNC: 13.5 G/DL (ref 11.7–15.7)
IMM GRANULOCYTES # BLD: 0 10E3/UL
IMM GRANULOCYTES NFR BLD: 1 %
LYMPHOCYTES # BLD AUTO: 1.4 10E3/UL (ref 0.8–5.3)
LYMPHOCYTES NFR BLD AUTO: 27 %
MAGNESIUM SERPL-MCNC: 2.2 MG/DL (ref 1.7–2.3)
MCH RBC QN AUTO: 29.2 PG (ref 26.5–33)
MCHC RBC AUTO-ENTMCNC: 32.8 G/DL (ref 31.5–36.5)
MCV RBC AUTO: 89 FL (ref 78–100)
MONOCYTES # BLD AUTO: 0.5 10E3/UL (ref 0–1.3)
MONOCYTES NFR BLD AUTO: 9 %
NEUTROPHILS # BLD AUTO: 3.1 10E3/UL (ref 1.6–8.3)
NEUTROPHILS NFR BLD AUTO: 57 %
NRBC # BLD AUTO: 0 10E3/UL
NRBC BLD AUTO-RTO: 0 /100
PLATELET # BLD AUTO: 208 10E3/UL (ref 150–450)
POTASSIUM SERPL-SCNC: 3.5 MMOL/L (ref 3.4–5.3)
RBC # BLD AUTO: 4.63 10E6/UL (ref 3.8–5.2)
SODIUM SERPL-SCNC: 139 MMOL/L (ref 135–145)
WBC # BLD AUTO: 5.4 10E3/UL (ref 4–11)

## 2025-05-15 PROCEDURE — 94660 CPAP INITIATION&MGMT: CPT

## 2025-05-15 PROCEDURE — 250N000013 HC RX MED GY IP 250 OP 250 PS 637: Performed by: HOSPITALIST

## 2025-05-15 PROCEDURE — 80048 BASIC METABOLIC PNL TOTAL CA: CPT | Performed by: HOSPITALIST

## 2025-05-15 PROCEDURE — 250N000011 HC RX IP 250 OP 636: Performed by: INTERNAL MEDICINE

## 2025-05-15 PROCEDURE — 99233 SBSQ HOSP IP/OBS HIGH 50: CPT | Performed by: INTERNAL MEDICINE

## 2025-05-15 PROCEDURE — 250N000013 HC RX MED GY IP 250 OP 250 PS 637: Performed by: STUDENT IN AN ORGANIZED HEALTH CARE EDUCATION/TRAINING PROGRAM

## 2025-05-15 PROCEDURE — 210N000001 HC R&B IMCU HEART CARE

## 2025-05-15 PROCEDURE — 97535 SELF CARE MNGMENT TRAINING: CPT | Mod: GO

## 2025-05-15 PROCEDURE — 99232 SBSQ HOSP IP/OBS MODERATE 35: CPT | Performed by: STUDENT IN AN ORGANIZED HEALTH CARE EDUCATION/TRAINING PROGRAM

## 2025-05-15 PROCEDURE — 36415 COLL VENOUS BLD VENIPUNCTURE: CPT | Performed by: HOSPITALIST

## 2025-05-15 PROCEDURE — 999N000157 HC STATISTIC RCP TIME EA 10 MIN

## 2025-05-15 PROCEDURE — 250N000013 HC RX MED GY IP 250 OP 250 PS 637: Performed by: INTERNAL MEDICINE

## 2025-05-15 PROCEDURE — 85025 COMPLETE CBC W/AUTO DIFF WBC: CPT | Performed by: HOSPITALIST

## 2025-05-15 PROCEDURE — P9047 ALBUMIN (HUMAN), 25%, 50ML: HCPCS | Performed by: INTERNAL MEDICINE

## 2025-05-15 PROCEDURE — 83735 ASSAY OF MAGNESIUM: CPT | Performed by: HOSPITALIST

## 2025-05-15 RX ORDER — ALBUMIN (HUMAN) 12.5 G/50ML
50 SOLUTION INTRAVENOUS ONCE
Status: COMPLETED | OUTPATIENT
Start: 2025-05-15 | End: 2025-05-15

## 2025-05-15 RX ORDER — BUMETANIDE 2 MG/1
6 TABLET ORAL DAILY
Status: DISCONTINUED | OUTPATIENT
Start: 2025-05-15 | End: 2025-05-19 | Stop reason: HOSPADM

## 2025-05-15 RX ADMIN — APIXABAN 5 MG: 5 TABLET, FILM COATED ORAL at 21:43

## 2025-05-15 RX ADMIN — SIMVASTATIN 10 MG: 10 TABLET, FILM COATED ORAL at 21:43

## 2025-05-15 RX ADMIN — SPIRONOLACTONE 25 MG: 25 TABLET, FILM COATED ORAL at 09:06

## 2025-05-15 RX ADMIN — MICONAZOLE NITRATE: 20 POWDER TOPICAL at 10:17

## 2025-05-15 RX ADMIN — DOBUTAMINE IN DEXTROSE 1.25 MCG/KG/MIN: 200 INJECTION, SOLUTION INTRAVENOUS at 17:16

## 2025-05-15 RX ADMIN — POTASSIUM CHLORIDE 20 MEQ: 1500 TABLET, EXTENDED RELEASE ORAL at 21:43

## 2025-05-15 RX ADMIN — ALBUMIN HUMAN 50 G: 0.25 SOLUTION INTRAVENOUS at 06:26

## 2025-05-15 RX ADMIN — EMPAGLIFLOZIN 10 MG: 10 TABLET, FILM COATED ORAL at 09:06

## 2025-05-15 RX ADMIN — ANORECTAL OINTMENT: 15.7; .44; 24; 20.6 OINTMENT TOPICAL at 17:20

## 2025-05-15 RX ADMIN — MICONAZOLE NITRATE: 20 POWDER TOPICAL at 21:43

## 2025-05-15 RX ADMIN — ASPIRIN 81 MG: 81 TABLET, COATED ORAL at 09:06

## 2025-05-15 RX ADMIN — APIXABAN 5 MG: 5 TABLET, FILM COATED ORAL at 09:06

## 2025-05-15 RX ADMIN — LEVOFLOXACIN 750 MG: 750 TABLET, FILM COATED ORAL at 14:58

## 2025-05-15 RX ADMIN — ESCITALOPRAM OXALATE 10 MG: 10 TABLET ORAL at 09:06

## 2025-05-15 RX ADMIN — FUROSEMIDE 80 MG: 10 INJECTION, SOLUTION INTRAMUSCULAR; INTRAVENOUS at 09:06

## 2025-05-15 RX ADMIN — BUMETANIDE 6 MG: 2 TABLET ORAL at 14:58

## 2025-05-15 RX ADMIN — POTASSIUM CHLORIDE 20 MEQ: 1500 TABLET, EXTENDED RELEASE ORAL at 09:05

## 2025-05-15 RX ADMIN — VANCOMYCIN HYDROCHLORIDE 125 MG: 125 CAPSULE ORAL at 09:06

## 2025-05-15 ASSESSMENT — ACTIVITIES OF DAILY LIVING (ADL)
ADLS_ACUITY_SCORE: 72
ADLS_ACUITY_SCORE: 71
ADLS_ACUITY_SCORE: 69
ADLS_ACUITY_SCORE: 71
ADLS_ACUITY_SCORE: 69
ADLS_ACUITY_SCORE: 71
ADLS_ACUITY_SCORE: 69
ADLS_ACUITY_SCORE: 69
ADLS_ACUITY_SCORE: 72
ADLS_ACUITY_SCORE: 69
ADLS_ACUITY_SCORE: 71
ADLS_ACUITY_SCORE: 72
ADLS_ACUITY_SCORE: 69
ADLS_ACUITY_SCORE: 71
ADLS_ACUITY_SCORE: 69
ADLS_ACUITY_SCORE: 69
ADLS_ACUITY_SCORE: 71
ADLS_ACUITY_SCORE: 69
ADLS_ACUITY_SCORE: 69

## 2025-05-15 NOTE — PROGRESS NOTES
Heart Failure Care Map  GOALS TO BE MET BEFORE DISCHARGE:    1. Decrease congestion and/or edema with diuretic therapy to achieve near optimal volume status.     Dyspnea improved: No, further care required to meet this goal. Please explain [place]   Edema improved: No, further care required to meet this goal. Please explain Still on high amounts of diuretics        Last 24 hour I/O:   Intake/Output Summary (Last 24 hours) at 5/15/2025 1356  Last data filed at 5/15/2025 0800  Gross per 24 hour   Intake 1310 ml   Output 3900 ml   Net -2590 ml           Net I/O and Weights since admission:   04/15 1500 - 05/15 1459  In: 6073.56 [P.O.:5150; I.V.:723.56]  Out: 24826 [Urine:39403]  Net: -22168.44     Vitals:    05/09/25 0825 05/11/25 0344 05/12/25 0350 05/15/25 0848   Weight: 134.2 kg (295 lb 13.7 oz) 130.2 kg (287 lb 0.6 oz) (!) 139.1 kg (306 lb 10.6 oz) 114.5 kg (252 lb 6.4 oz)       2.  O2 sats > 90% on room air, or at prior home O2 therapy level.      Able to wean O2 this shift to keep sats above 90%?: Yes, satisfactory for discharge.   Does patient use Home O2? Yes-  Pt currently at baseline 3LNC          Current oxygenation status:   SpO2: 100 %     O2 Device: Nasal cannula, Oxygen Delivery: 3 LPM    3.  Tolerates ambulation and mobility near baseline.     Ambulation: No, further care required to meet this goal. Please explain Currently assist 1-2 with GBW.   Times patient ambulated with staff this shift: 2    Please review the Heart Failure Care Map for additional HF goal outcomes.    Josue Victoria, RN  5/15/2025

## 2025-05-15 NOTE — PLAN OF CARE
Heart Failure Care Map  GOALS TO BE MET BEFORE DISCHARGE:    1. Decrease congestion and/or edema with diuretic therapy to achieve near optimal volume status.     Dyspnea improved: No, further care required to meet this goal. Please explain Pt becomes SOB with any activity   Edema improved: No, further care required to meet this goal. Please explain Pt still has edema in BLE, on IV push lasix        Last 24 hour I/O:   Intake/Output Summary (Last 24 hours) at 5/14/2025 2219  Last data filed at 5/14/2025 1936  Gross per 24 hour   Intake 650 ml   Output 5000 ml   Net -4350 ml           Net I/O and Weights since admission:   04/14 2300 - 05/14 2259  In: 5413.56 [P.O.:4800; I.V.:613.56]  Out: 14212 [Urine:73443]  Net: -84557.44     Vitals:    05/09/25 0825 05/11/25 0344 05/12/25 0350   Weight: 134.2 kg (295 lb 13.7 oz) 130.2 kg (287 lb 0.6 oz) (!) 139.1 kg (306 lb 10.6 oz)       2.  O2 sats > 90% on room air, or at prior home O2 therapy level.      Able to wean O2 this shift to keep sats above 90%?: Yes, satisfactory for discharge.   Does patient use Home O2? Yes, pt is back at her baseline home O2 level.          Current oxygenation status:   SpO2: 93 %     O2 Device: Nasal cannula, Oxygen Delivery: 3 LPM    3.  Tolerates ambulation and mobility near baseline.     Ambulation: No, further care required to meet this goal. Please explain Pt only able to stand and pivot with minimal steps   Times patient ambulated with staff this shift: 2    Please review the Heart Failure Care Map for additional HF goal outcomes.    Ginette Santos RN  5/14/2025      Up to chair for dinner tonight with 2 assist. VSS on her baseline O2 of 3L during the day and bipap overnight. Denies pain. Voiding well with the primafit. Tele is NSR with BBB and T-wave inversion. Dobuamine gtt running at 5 ml/hr. Pt not wanting to be repositioned, educated about importance of weight shifting and position changes.

## 2025-05-15 NOTE — PROGRESS NOTES
[facility]    Medicine Progress Note - Hospitalist Service    Date of Admission:  5/8/2025    Assessment & Plan   49-year-old female with pulmonary HTN, chronic respiratory failure and recent DVT presented with shortness of breath and weakness and found to be septic and hypoxic with LLE cellulitis and decompensated heart failure. Has been transitioned to PO abx but still on IV lasix and dobutamine gtt.     Severe sepsis with endorgan dysfunction involving lactic acidosis  Worsened respiratory failure  Gram-negative bacteremia  Cellulitis, LLE  Recent shingles, L flank  Candida intertrigo  Blood culture grew pan-sensitive Pseudomonas  MRSA screen negative  CT showed no abscess  ID consulted- stopped micafungin and zosyn, switched to PO levaquin  and PO vanc      Acute on chronic HFpEF  Critical pulmonary hypertension  Acute on chronic respiratory failure with hypoxia and hypercapnia  S/p 10 L oximask, BiPAP, now weaned to nasal cannula.  Sleeping on 3 L of oxygen via nasal cannula which is her baseline.  Cardiology consulted: lasix gtt switched to IV Q8, continued dobutamine gtt  PTA spironolactone, Jardiance  CTA chest shows no PE  Scheduled K replacement while on IV lasix, monitor K level     Recent DVT, LLE  Chronic lymphedema  PTA Eliquis  Bumex 6 mg oral daily  Lymph team consulted     Prediabetes  Hypoglycemia, resolved  Stopped SSI and Accu-Cheks     Morbid obesity  BMI 50          Diet: Low Saturated Fat Na <2400 mg  Fluid restriction 1800 ML FLUID    DVT Prophylaxis: DOAC  Brar Catheter: Not present  Lines: None     Cardiac Monitoring: ACTIVE order. Indication: Acute decompensated heart failure (48 hours)  Code Status: Full Code      Clinically Significant Risk Factors                   # Hypertension: Noted on problem list  # Acute heart failure with preserved ejection fraction: heart failure noted on problem list, last echo with EF >50%, and receiving IV diuretics           #  "Morbid Obesity: Estimated body mass index is 43.32 kg/m  as calculated from the following:    Height as of this encounter: 1.626 m (5' 4\").    Weight as of this encounter: 114.5 kg (252 lb 6.4 oz).      # Financial/Environmental Concerns: none         Social Drivers of Health    Food Insecurity: Low Risk  (5/8/2025)    Food Insecurity     Within the past 12 months, did you worry that your food would run out before you got money to buy more?: No     Within the past 12 months, did the food you bought just not last and you didn t have money to get more?: No   Recent Concern: Food Insecurity - High Risk (3/23/2025)    Food Insecurity     Within the past 12 months, did you worry that your food would run out before you got money to buy more?: Yes     Within the past 12 months, did the food you bought just not last and you didn t have money to get more?: No   Housing Stability: High Risk (5/8/2025)    Housing Stability     Do you have housing? : No     Are you worried about losing your housing?: No   Transportation Needs: High Risk (5/8/2025)    Transportation Needs     Within the past 12 months, has lack of transportation kept you from medical appointments, getting your medicines, non-medical meetings or appointments, work, or from getting things that you need?: Yes   Physical Activity: Inactive (3/21/2024)    Exercise Vital Sign     Days of Exercise per Week: 0 days     Minutes of Exercise per Session: 0 min   Interpersonal Safety: Low Risk  (5/8/2025)    Interpersonal Safety     Do you feel physically and emotionally safe where you currently live?: Yes     Within the past 12 months, have you been hit, slapped, kicked or otherwise physically hurt by someone?: No     Within the past 12 months, have you been humiliated or emotionally abused in other ways by your partner or ex-partner?: No   Recent Concern: Interpersonal Safety - High Risk (3/23/2025)    Interpersonal Safety     Do you feel physically and emotionally safe " where you currently live?: No     Within the past 12 months, have you been hit, slapped, kicked or otherwise physically hurt by someone?: No     Within the past 12 months, have you been humiliated or emotionally abused in other ways by your partner or ex-partner?: No   Social Connections: Unknown (3/1/2022)    Social Connection and Isolation Panel [NHANES]     Frequency of Communication with Friends and Family: Twice a week     Frequency of Social Gatherings with Friends and Family: Twice a week          Disposition Plan     Medically Ready for Discharge: Anticipated in 2-4 Days             Leigh Hardy MD  Hospitalist Service  [facility]  Securely message with [employer] (more info)  Text page via [facility] Paging/Directory   ______________________________________________________________________    Interval History   Patient seen me today.  Chart, labs and imaging results reviewed.  Patient sitting in chair at the bedside.  On 3 L of oxygen via nasal cannula.  No distress noted.  Management plan discussed with the patient and she expressed understanding.    Physical Exam   Vital Signs: Temp: 97.9  F (36.6  C) Temp src: Oral BP: 111/79 Pulse: 88   Resp: 16 SpO2: 100 % O2 Device: Nasal cannula Oxygen Delivery: 3 LPM  Weight: 252 lbs 6.4 oz    General Appearance: No distress noted, chronically sick looking  Respiratory: Diminished air entry bilaterally basally   Cardiovascular: S1-S2 were heard, no murmur or gallop  GI: Soft abdomen, no tenderness, normoactive bowel sounds  HAILEY: Bilateral lower extremity lymphedema       Medical Decision Making       40 MINUTES SPENT BY ME on the date of service doing chart review, history, exam, documentation & further activities per the note.      Data

## 2025-05-15 NOTE — PROGRESS NOTES
"CLINICAL NUTRITION SERVICES - ASSESSMENT NOTE    RECOMMENDATIONS FOR MDs/PROVIDERS TO ORDER:      Registered Dietitian Interventions:      Future/Additional Recommendations:  Monitor intake, weight, labs     REASON FOR ASSESSMENT  LOS    HPI: 49-year-old female with pulmonary HTN, chronic respiratory failure and recent DVT presented with shortness of breath and weakness and found to be septic and hypoxic.     INFORMATION OBTAINED  Assessed patient in room.    NUTRITION HISTORY  Met with pt, good appetite now and PTA. Typically eats 2 meals per day and feels if anything has been eating more than usual. Has a hx of an eating disorder, has been to the [facility], meets with a therapist every other week so knows what to do. Tries to follow a low sodium diet at home. Is not sure of UBW. Observed lunch tray-ate 100%.     CURRENT NUTRITION ORDERS  Diet: 1800 mL Fluid Restriction and Low Saturated Fat/2400 mg Sodium    CURRENT INTAKE/TOLERANCE  100% of meals per flowsheets. Initially ordering <1000 kcal/day, improving.     LABS  Nutrition-relevant labs: Reviewed    MEDICATIONS  Nutrition-relevant medications: Reviewed    ANTHROPOMETRICS  Height: 162.6 cm (5' 4\")  Admission Weight: 134.2 kg (295 lb 13.7 oz) (05/09/25 0825)   Most Recent Weight: 114.5 kg (252 lb 6.4 oz) (05/15/25 0848)  IBW: 54.5 kg  BMI: Body mass index is 43.32 kg/m .   Weight History:   Wt Readings from Last 20 Encounters:   05/15/25 114.5 kg (252 lb 6.4 oz)   04/15/25 116.9 kg (257 lb 12.8 oz)   04/03/25 123.4 kg (272 lb 0.8 oz)   04/01/25 115.2 kg (254 lb)   03/28/25 118.8 kg (262 lb)   03/17/25 122 kg (269 lb)   02/25/25 124.8 kg (275 lb 1.6 oz)   02/24/25 124 kg (273 lb 6.4 oz)   02/19/25 124.7 kg (275 lb)   02/18/25 124 kg (273 lb 5.9 oz)   01/13/25 140.6 kg (310 lb)   09/11/24 135.2 kg (298 lb)   08/05/24 133.8 kg (295 lb)   07/19/24 146.5 kg (322 lb 15.6 oz)   06/11/24 134.3 kg (296 lb)   05/01/24 138.2 kg (304 lb 10.8 oz)   04/23/24 144.7 kg " (319 lb 0.1 oz)   04/17/24 143.5 kg (316 lb 4 oz)   04/04/24 149.7 kg (330 lb)   03/20/24 150 kg (330 lb 11 oz)     -23.3 L since admit     Dosing Weight: 70 kg, based on adjusted wt    ASSESSED NUTRITION NEEDS  Estimated Energy Needs: 2100 kcals/day ([place] [employer])  Justification: 120% of MREE  Estimated Protein Needs: 70-84 grams protein/day (1 - 1.2 grams of pro/kg)  Justification: Maintenance  Estimated Fluid Needs: 1800 mL/day   Justification: On a fluid restriction    SYSTEM AND PHYSICAL FINDINGS    GI symptoms: Reviewed  Skin/wounds: Reviewed    MALNUTRITION  % Intake: Decreased intake does not meet criteria  % Weight Loss: difficult to assess with fluid losses    Subcutaneous Fat Loss: None observed  Muscle Loss: None observed  Fluid Accumulation/Edema: Moderate to severe, 2-4+  Malnutrition Diagnosis: Unable to determine due to unable to assess weight changes d/t fluid  Malnutrition Present on Admission: Unable to assess    NUTRITION DIAGNOSIS  Inadequate oral intake related to current medical condition as evidenced by po intake < 50% estimated needs some days during admit.    INTERVENTIONS  No interventions at this time    GOALS  Patient to consume % of nutritionally adequate meal trays TID, or the equivalent with supplements/snacks.     MONITORING/EVALUATION  Progress toward goals will be monitored and evaluated per policy.

## 2025-05-15 NOTE — PLAN OF CARE
Goal Outcome Evaluation:      Plan of Care Reviewed With: patient    Overall Patient Progress: improvingOverall Patient Progress: improving     Pt. Denied pain, nausea, dyspnea, or dizziness. On bi-pap overnight, then to 3L per N/c as at home baseline. VSS, continue to monitor.

## 2025-05-15 NOTE — PLAN OF CARE
Goal Outcome Evaluation:      Plan of Care Reviewed With: patient    Overall Patient Progress: improving       Received IV albumin and IV lasix this morning. Now on PO Bumex. Diuresing well. Purewick removed due to menses. Calmoseptine cream to buttocks/perineum due to maceration/rash. Lung sounds diminished. Patient is at baseline oxygen, 3 lpm O2. Up to chair for meals. Small loose BM this afternoon. Vitally stable. NSR on tele.    Heart Failure Care Map  GOALS TO BE MET BEFORE DISCHARGE:    1. Decrease congestion and/or edema with diuretic therapy to achieve near optimal volume status.     Dyspnea improved: Yes, satisfactory for discharge.   Edema improved: No, further care required to meet this goal. Please explain +3 bilateral lower extremity edema.        Last 24 hour I/O:   Intake/Output Summary (Last 24 hours) at 5/15/2025 1702  Last data filed at 5/15/2025 1500  Gross per 24 hour   Intake 1510 ml   Output 3100 ml   Net -1590 ml           Net I/O and Weights since admission:   04/15 2300 - 05/15 2259  In: 6573.56 [P.O.:5650; I.V.:723.56]  Out: 07219 [Urine:91367]  Net: -41690.44     Vitals:    05/09/25 0825 05/11/25 0344 05/12/25 0350 05/15/25 0848   Weight: 134.2 kg (295 lb 13.7 oz) 130.2 kg (287 lb 0.6 oz) (!) 139.1 kg (306 lb 10.6 oz) 114.5 kg (252 lb 6.4 oz)       2.  O2 sats > 90% on room air, or at prior home O2 therapy level.      Able to wean O2 this shift to keep sats above 90%?: Yes, satisfactory for discharge.   Does patient use Home O2? Yes-  3 lpm O2 at baseline.          Current oxygenation status:   SpO2: 97 %     O2 Device: Nasal cannula, Oxygen Delivery: 3 LPM    3.  Tolerates ambulation and mobility near baseline.     Ambulation: No, further care required to meet this goal. Please explain Ambulating short distance to recliner from bed.   Times patient ambulated with staff this shift: Not ambulating hallways    Please review the Heart Failure Care Map for additional HF goal outcomes.    Camron  MAURO Chavez RN  5/15/2025

## 2025-05-15 NOTE — PROGRESS NOTES
Care Management Follow Up    Length of Stay (days): 7    Expected Discharge Date: 05/16/2025    Anticipated Discharge Plan:   Transitional care    Transportation: TBD    PT Recommendations: Transitional Care Facility  OT Recommendations:  (S) Transitional Care Facility     Barriers to Discharge: medical stability, IV meds    Prior Living Situation: town home with alone    Discussed  Partnership in Safe Discharge Planning  document with patient/family: No     Handoff Completed: No, handoff not indicated or clinically appropriate    Patient/Spokesperson Updated: No    Additional Information:  Care management following. Pt has been accepted to [facility].   CM will follow and coordinate TCU placement when medically ready.  PAS completed.  Next Steps: Follow for placement.     Alicia Bledsoe, ROSELYNSW

## 2025-05-15 NOTE — PROGRESS NOTES
HEART CARE NOTE          Assessment/Recommendations     1. HFpEF/RV dysfunction c/b cardiogenic shock  Assessment / Plan  Transition to oral diuretic regimen; continue to monitor UOP and renal function closely; give IV albumin bolus  Continue dobutamine gtt for hemodynamic support  Patient is at increased risk for adverse cardiac events 2/2 non-compliance, morbid obesity, renal dysfunction     2. HUNG in CKD stage 3  Assessment / Plan  Resolving; Follows with [facility]  Diuresis as above; renal function wnl; continue to monitor UOP and renal function closely     3. HTN  Assessment / Plan   Adequately controlled- borderline BP - no additional recommendations at this time     4. NARCISA  Assessment / Plan  CPAP at bedtime     5. PE c/b Core pulmonale + pulmonary HTN  Assessment / Plan  Hx of PE; currently on apixaban  C/b Core pulmonale; pulm Htn likely multifactorial given hx of PE and current WHO Group 2 2/2 volume overload - plans for RHC this admission once near euvolemia       Plan of care discussed on May 15, 2025 with patient at bedside, and primary team overseeing patient's care    History of Present Illness/Subjective    Ms. Bess Enamorado is a 49 year old female with a PMHx significant for (per Epic notation) Chronic Hypoxic Respiratory Failure (on 3L NC at home), Pulmonary HTN, Chronic CHF who was admitted on 5/8/25 after presenting to [facility] ED for Sepsis with Hypoxia.      Today, Mrs. Enamorado denies acute cardiac events or complaints; Management plan as detailed above     ECG: personally reviewed; normal sinus rhythm, nonspecific ST and T waves changes, incomplete RBBB.     ECHO (personnaly Reviewed on 5/12/25):  1. The left ventricle is normal in size. Image quality does not provide for  detailed assessment of LV systolic function, but is felt to be normal with a  visually estimated ejection fraction of roughly 65-70%.  2. There is mild calcific mitral stenosis.  3. There is prominent right ventricular  "enlargement with reduced right  ventricular systolic performance though difficult to quantify due to  suboptimal acoustic imaging.  4. There is biatrial enlargement though difficult to quantify due to  suboptimal acoustic imaging.  5. Right ventricular systolic pressure relative to right atrial pressure is  severely increased. The pulmonary artery pressure is estimated to be   mmHg plus right atrial pressure (the IVC is mildly dilated).     The acoustic quality of this study is very poor. If a more accurate assessment  of left ventricular systolicperformance, regional wall motion, and other  morphology/function is required; would recommend cardiac MRI or other  alternative imaging modality for further evaluation.     When compared to the prior real-time echocardiogram dated 12 February 2025,  there has been a further increase in the pulmonary artery pressure estimate.    Telemetry: personally reviewed May 15, 2025; notable for sinus     Lab results: personally reviewed May 15, 2025; notable for renal function wnl    Medical history and pertinent documents reviewed in Care Everywhere please where applicable see details above        Physical Examination Review of Systems   /71 (BP Location: Left arm)   Pulse 80   Temp 98  F (36.7  C) (Axillary)   Resp 21   Ht 1.626 m (5' 4\")   Wt (!) 139.1 kg (306 lb 10.6 oz)   LMP 04/08/2025 (Within Days)   SpO2 94%   BMI 52.64 kg/m    Body mass index is 52.64 kg/m .  Wt Readings from Last 3 Encounters:   05/12/25 (!) 139.1 kg (306 lb 10.6 oz)   04/15/25 116.9 kg (257 lb 12.8 oz)   04/03/25 123.4 kg (272 lb 0.8 oz)     General Appearance:   no distress, normal body habitus   ENT/Mouth: membranes moist, no oral lesions or bleeding gums.      EYES:  no scleral icterus, normal conjunctivae   Neck: no carotid bruits or thyromegaly   Chest/Lungs:   lungs are clear to auscultation, no rales or wheezing, equal chest wall expansion    Cardiovascular:   Regular. Normal " first and second heart sounds with no murmurs, rubs, or gallops; the carotid, radial and posterior tibial pulses are intact, no JVD    Abdomen:  no organomegaly, masses, bruits, or tenderness; bowel sounds are present   Extremities: no cyanosis or clubbing   Skin: no xanthelasma, warm.    Neurologic: NAD     Psychiatric: alert and oriented x3, calm     A complete 10 systems ROS was reviewed  And is negative except what is listed in the HPI.          Medical History  Surgical History Family History Social History   Past Medical History:   Diagnosis Date    Acute on chronic diastolic congestive heart failure (H) 02/09/2022    Arthritis 2019    Hips    ASCUS favor benign 08/2015    Neg HPV    Deep vein thrombosis (DVT) of left lower extremity (H) 03/25/2025    Depressive disorder 2010    H/O colposcopy with cervical biopsy 09/14/2015    Bx & ECC - negative    Hypertension 2019    LSIL on Pap smear 07/2014    Neg high risk HPV    Multiple sclerosis (H) 08/29/2005 9/18/200pt dx with MS 2004--dx by neurolgist and is followed by Dr. Harris    Myocardial infarction (H)     Obese     Pneumonia due to infectious organism, unspecified laterality, unspecified part of lung 02/08/2022    Sepsis (Temp 101, , WBC 13.0) 10/23/2018    Shingles 10/2016    SIRS (systemic inflammatory response syndrome) (H) 03/04/2021    Sleep apnea     UNSPEC CONSTIPATION 09/18/2006 9/18/2006   Has tried otc suppository and otc lax.     Past Surgical History:   Procedure Laterality Date    CHOLECYSTECTOMY, LAPOROSCOPIC      Cholecystectomy, Laparoscopic    COLONOSCOPY  2007?    Bathroom issue..results normal    COMBINED CYSTOSCOPY, RETROGRADES, EXCHANGE STENT URETER(S) Right 2/21/2022    Procedure: CYSTOSCOPY, WITH right RETROGRADE PYELOGRAM AND right URETERAL STENT PLACEMENT;  Surgeon: Doyle Palomares MD;  Location: [facility] [place] OR    OPEN REDUCTION INTERNAL FIXATION TOE(S)  4/13/2012    Procedure:OPEN REDUCTION INTERNAL FIXATION  TOE(S); Open reduction internal fixation right proximal fifth metatarsal fracture-   Anes-choice block; Surgeon:LEY, JEFFREY DUANE; Location:[place] OR    PICC SINGLE LUMEN PLACEMENT  3/20/2024    PICC TRIPLE LUMEN PLACEMENT  2/21/2022         no family history of premature coronary artery disease Social History     Socioeconomic History    Marital status: Single     Spouse name: Not on file    Number of children: Not on file    Years of education: Not on file    Highest education level: Not on file   Occupational History     Employer: [employer]   Tobacco Use    Smoking status: Never    Smokeless tobacco: Never   Vaping Use    Vaping status: Never Used   Substance and Sexual Activity    Alcohol use: Yes     Comment: social    Drug use: No    Sexual activity: Not Currently     Partners: Male     Birth control/protection: Pill   Other Topics Concern    Parent/sibling w/ CABG, MI or angioplasty before 65F 55M? No   Social History Narrative    , 3rd-5th grade     Social Drivers of Health     Financial Resource Strain: Low Risk  (5/8/2025)    Financial Resource Strain     Within the past 12 months, have you or your family members you live with been unable to get utilities (heat, electricity) when it was really needed?: No   Food Insecurity: Low Risk  (5/8/2025)    Food Insecurity     Within the past 12 months, did you worry that your food would run out before you got money to buy more?: No     Within the past 12 months, did the food you bought just not last and you didn t have money to get more?: No   Recent Concern: Food Insecurity - High Risk (3/23/2025)    Food Insecurity     Within the past 12 months, did you worry that your food would run out before you got money to buy more?: Yes     Within the past 12 months, did the food you bought just not last and you didn t have money to get more?: No   Transportation Needs: High Risk (5/8/2025)    Transportation Needs     Within the past 12 months,  has lack of transportation kept you from medical appointments, getting your medicines, non-medical meetings or appointments, work, or from getting things that you need?: Yes   Physical Activity: Inactive (3/21/2024)    Exercise Vital Sign     Days of Exercise per Week: 0 days     Minutes of Exercise per Session: 0 min   Stress: No Stress Concern Present (12/4/2020)    [employer] - Occupational Stress Questionnaire     Feeling of Stress : Only a little   Social Connections: Unknown (3/1/2022)    Social Connection and Isolation Panel [NHANES]     Frequency of Communication with Friends and Family: Twice a week     Frequency of Social Gatherings with Friends and Family: Twice a week     Attends Buddhist Services: Not on file     Active Member of Clubs or Organizations: Not on file     Attends Club or Organization Meetings: Not on file     Marital Status: Not on file   Interpersonal Safety: Low Risk  (5/8/2025)    Interpersonal Safety     Do you feel physically and emotionally safe where you currently live?: Yes     Within the past 12 months, have you been hit, slapped, kicked or otherwise physically hurt by someone?: No     Within the past 12 months, have you been humiliated or emotionally abused in other ways by your partner or ex-partner?: No   Recent Concern: Interpersonal Safety - High Risk (3/23/2025)    Interpersonal Safety     Do you feel physically and emotionally safe where you currently live?: No     Within the past 12 months, have you been hit, slapped, kicked or otherwise physically hurt by someone?: No     Within the past 12 months, have you been humiliated or emotionally abused in other ways by your partner or ex-partner?: No   Housing Stability: High Risk (5/8/2025)    Housing Stability     Do you have housing? : No     Are you worried about losing your housing?: No           Lab Results    Chemistry/lipid CBC Cardiac Enzymes/BNP/TSH/INR   Lab Results   Component Value Date     CHOL 81 02/12/2025    HDL 11 (L) 02/12/2025    TRIG 134 02/12/2025    BUN 15.3 05/15/2025     05/15/2025    CO2 29 05/15/2025    Lab Results   Component Value Date    WBC 5.4 05/15/2025    HGB 13.5 05/15/2025    HCT 41.2 05/15/2025    MCV 89 05/15/2025     05/15/2025    Lab Results   Component Value Date    TROPONINI 0.03 04/06/2022     (H) 04/06/2022    TSH 3.52 02/12/2025    INR 1.30 (H) 03/23/2025     Lab Results   Component Value Date    TROPONINI 0.03 04/06/2022          Weight:    Wt Readings from Last 3 Encounters:   05/12/25 (!) 139.1 kg (306 lb 10.6 oz)   04/15/25 116.9 kg (257 lb 12.8 oz)   04/03/25 123.4 kg (272 lb 0.8 oz)       Allergies  No Known Allergies      Surgical History  Past Surgical History:   Procedure Laterality Date    CHOLECYSTECTOMY, LAPOROSCOPIC      Cholecystectomy, Laparoscopic    COLONOSCOPY  2007?    Bathroom issue..results normal    COMBINED CYSTOSCOPY, RETROGRADES, EXCHANGE STENT URETER(S) Right 2/21/2022    Procedure: CYSTOSCOPY, WITH right RETROGRADE PYELOGRAM AND right URETERAL STENT PLACEMENT;  Surgeon: Doyle Palomares MD;  Location: [facility] OR    OPEN REDUCTION INTERNAL FIXATION TOE(S)  4/13/2012    Procedure:OPEN REDUCTION INTERNAL FIXATION TOE(S); Open reduction internal fixation right proximal fifth metatarsal fracture-   Anes-choice block; Surgeon:LEY, JEFFREY DUANE; Location:[place] OR    PICC SINGLE LUMEN PLACEMENT  3/20/2024    PICC TRIPLE LUMEN PLACEMENT  2/21/2022            Social History  Tobacco:   History   Smoking Status    Never   Smokeless Tobacco    Never    Alcohol:   Social History    Substance and Sexual Activity      Alcohol use: Yes        Comment: social   Illicit Drugs:   History   Drug Use No       Family History  Family History   Problem Relation Age of Onset    Neurologic Disorder Father         MS    Heart Disease Father         irregular heart rate    Diabetes Father     Melanoma Father     Sleep Apnea Father     Other  Cancer Father     Cancer Maternal Grandfather         lung    Other Cancer Maternal Grandfather     Cancer Paternal Grandmother         stomach    Other Cancer Paternal Grandmother     Cancer Mother     Other Cancer Mother     Thyroid Disease Mother     Gynecology Maternal Aunt         PCOS    Hypertension Maternal Grandmother     Anxiety Disorder Maternal Grandmother     Thyroid Disease Maternal Grandmother     Anxiety Disorder Sister           Raúl Sanchez MD on 5/15/2025      cc: Mikala Luna

## 2025-05-16 LAB
ANION GAP SERPL CALCULATED.3IONS-SCNC: 12 MMOL/L (ref 7–15)
BUN SERPL-MCNC: 16.5 MG/DL (ref 6–20)
CALCIUM SERPL-MCNC: 10.6 MG/DL (ref 8.8–10.4)
CHLORIDE SERPL-SCNC: 100 MMOL/L (ref 98–107)
CREAT SERPL-MCNC: 0.71 MG/DL (ref 0.51–0.95)
EGFRCR SERPLBLD CKD-EPI 2021: >90 ML/MIN/1.73M2
GLUCOSE SERPL-MCNC: 91 MG/DL (ref 70–99)
HCO3 SERPL-SCNC: 25 MMOL/L (ref 22–29)
HOLD SPECIMEN: NORMAL
POTASSIUM SERPL-SCNC: 3.6 MMOL/L (ref 3.4–5.3)
SODIUM SERPL-SCNC: 137 MMOL/L (ref 135–145)

## 2025-05-16 PROCEDURE — 82374 ASSAY BLOOD CARBON DIOXIDE: CPT | Performed by: STUDENT IN AN ORGANIZED HEALTH CARE EDUCATION/TRAINING PROGRAM

## 2025-05-16 PROCEDURE — 36415 COLL VENOUS BLD VENIPUNCTURE: CPT | Performed by: STUDENT IN AN ORGANIZED HEALTH CARE EDUCATION/TRAINING PROGRAM

## 2025-05-16 PROCEDURE — 999N000157 HC STATISTIC RCP TIME EA 10 MIN

## 2025-05-16 PROCEDURE — 99233 SBSQ HOSP IP/OBS HIGH 50: CPT | Performed by: INTERNAL MEDICINE

## 2025-05-16 PROCEDURE — 250N000013 HC RX MED GY IP 250 OP 250 PS 637: Performed by: INTERNAL MEDICINE

## 2025-05-16 PROCEDURE — 210N000001 HC R&B IMCU HEART CARE

## 2025-05-16 PROCEDURE — 99232 SBSQ HOSP IP/OBS MODERATE 35: CPT | Performed by: STUDENT IN AN ORGANIZED HEALTH CARE EDUCATION/TRAINING PROGRAM

## 2025-05-16 PROCEDURE — 250N000013 HC RX MED GY IP 250 OP 250 PS 637: Performed by: HOSPITALIST

## 2025-05-16 PROCEDURE — 250N000013 HC RX MED GY IP 250 OP 250 PS 637: Performed by: STUDENT IN AN ORGANIZED HEALTH CARE EDUCATION/TRAINING PROGRAM

## 2025-05-16 RX ADMIN — MICONAZOLE NITRATE: 20 POWDER TOPICAL at 22:14

## 2025-05-16 RX ADMIN — POTASSIUM CHLORIDE 20 MEQ: 1500 TABLET, EXTENDED RELEASE ORAL at 22:14

## 2025-05-16 RX ADMIN — APIXABAN 5 MG: 5 TABLET, FILM COATED ORAL at 09:34

## 2025-05-16 RX ADMIN — MICONAZOLE NITRATE: 20 POWDER TOPICAL at 09:35

## 2025-05-16 RX ADMIN — APIXABAN 5 MG: 5 TABLET, FILM COATED ORAL at 22:14

## 2025-05-16 RX ADMIN — BUMETANIDE 6 MG: 2 TABLET ORAL at 09:34

## 2025-05-16 RX ADMIN — EMPAGLIFLOZIN 10 MG: 10 TABLET, FILM COATED ORAL at 09:34

## 2025-05-16 RX ADMIN — LEVOFLOXACIN 750 MG: 750 TABLET, FILM COATED ORAL at 16:07

## 2025-05-16 RX ADMIN — ESCITALOPRAM OXALATE 10 MG: 10 TABLET ORAL at 09:34

## 2025-05-16 RX ADMIN — SIMVASTATIN 10 MG: 10 TABLET, FILM COATED ORAL at 22:14

## 2025-05-16 RX ADMIN — POTASSIUM CHLORIDE 20 MEQ: 1500 TABLET, EXTENDED RELEASE ORAL at 09:34

## 2025-05-16 RX ADMIN — VANCOMYCIN HYDROCHLORIDE 125 MG: 125 CAPSULE ORAL at 09:34

## 2025-05-16 RX ADMIN — SPIRONOLACTONE 25 MG: 25 TABLET, FILM COATED ORAL at 09:34

## 2025-05-16 RX ADMIN — ASPIRIN 81 MG: 81 TABLET, COATED ORAL at 09:34

## 2025-05-16 ASSESSMENT — ACTIVITIES OF DAILY LIVING (ADL)
ADLS_ACUITY_SCORE: 67
ADLS_ACUITY_SCORE: 71
ADLS_ACUITY_SCORE: 69
ADLS_ACUITY_SCORE: 67
ADLS_ACUITY_SCORE: 71
ADLS_ACUITY_SCORE: 67
ADLS_ACUITY_SCORE: 71
ADLS_ACUITY_SCORE: 69
ADLS_ACUITY_SCORE: 69
ADLS_ACUITY_SCORE: 71
ADLS_ACUITY_SCORE: 67
ADLS_ACUITY_SCORE: 71
ADLS_ACUITY_SCORE: 71
ADLS_ACUITY_SCORE: 67
ADLS_ACUITY_SCORE: 69
ADLS_ACUITY_SCORE: 67
ADLS_ACUITY_SCORE: 69
ADLS_ACUITY_SCORE: 67
ADLS_ACUITY_SCORE: 67

## 2025-05-16 NOTE — PLAN OF CARE
"  Problem: Adult Inpatient Plan of Care  Goal: Plan of Care Review  Description: The Plan of Care Review/Shift note should be completed every shift.  The Outcome Evaluation is a brief statement about your assessment that the patient is improving, declining, or no change.  This information will be displayed automatically on your shiftnote.  Outcome: Progressing  Goal: Patient-Specific Goal (Individualized)  Description: You can add care plan individualizations to a care plan. Examples of Individualization might be:  \"Parent requests to be called daily at 9am for status\", \"I have a hard time hearing out of my right ear\", or \"Do not touch me to wake me up as it startlesme\".  Outcome: Progressing  Goal: Absence of Hospital-Acquired Illness or Injury  Outcome: Progressing  Intervention: Identify and Manage Fall Risk  Recent Flowsheet Documentation  Taken 5/16/2025 1342 by Maeve Hobson RN  Safety Promotion/Fall Prevention: activity supervised  Taken 5/16/2025 0930 by Maeve Hobson RN  Safety Promotion/Fall Prevention: activity supervised  Intervention: Prevent and Manage VTE (Venous Thromboembolism) Risk  Recent Flowsheet Documentation  Taken 5/16/2025 1342 by Maeve Hobson RN  VTE Prevention/Management: SCDs off (sequential compression devices)  Taken 5/16/2025 0930 by Maeve Hobson RN  VTE Prevention/Management: SCDs off (sequential compression devices)  Intervention: Prevent Infection  Recent Flowsheet Documentation  Taken 5/16/2025 1342 by Maeve Hobson RN  Infection Prevention: hand hygiene promoted  Taken 5/16/2025 0930 by Maeve Hobson RN  Infection Prevention: hand hygiene promoted  Goal: Optimal Comfort and Wellbeing  Outcome: Progressing  Goal: Readiness for Transition of Care  Outcome: Progressing     Problem: Comorbidity Management  Goal: Maintenance of COPD Symptom Control  Outcome: Progressing  Intervention: Maintain COPD Symptom Control  Recent Flowsheet " Documentation  Taken 5/16/2025 1342 by Maeve Hobson RN  Medication Review/Management: medications reviewed  Taken 5/16/2025 0930 by Maeve Hobson RN  Medication Review/Management: medications reviewed  Goal: Blood Glucose Levels Within Targeted Range  Outcome: Progressing  Intervention: Monitor and Manage Glycemia  Recent Flowsheet Documentation  Taken 5/16/2025 1342 by Maeve Hobson RN  Medication Review/Management: medications reviewed  Taken 5/16/2025 0930 by Maeve Hobson RN  Medication Review/Management: medications reviewed  Goal: Blood Pressure in Desired Range  Outcome: Progressing  Intervention: Maintain Blood Pressure Management  Recent Flowsheet Documentation  Taken 5/16/2025 1342 by Maeve Hobson RN  Medication Review/Management: medications reviewed  Taken 5/16/2025 0930 by Maeve Hobson RN  Medication Review/Management: medications reviewed  Goal: Maintenance of Behavioral Health Symptom Control  Outcome: Progressing  Intervention: Maintain Behavioral Health Symptom Control  Recent Flowsheet Documentation  Taken 5/16/2025 1342 by Maeve Hobson RN  Medication Review/Management: medications reviewed  Taken 5/16/2025 0930 by Maeve Hobson RN  Medication Review/Management: medications reviewed  Goal: Maintenance of Heart Failure Symptom Control  Outcome: Progressing  Intervention: Maintain Heart Failure Management  Recent Flowsheet Documentation  Taken 5/16/2025 1342 by Maeve Hobson RN  Medication Review/Management: medications reviewed  Taken 5/16/2025 0930 by Maeve Hobson RN  Medication Review/Management: medications reviewed     Problem: Pain Acute  Goal: Optimal Pain Control and Function  Outcome: Progressing  Intervention: Prevent or Manage Pain  Recent Flowsheet Documentation  Taken 5/16/2025 1342 by Maeve Hobson RN  Bowel Elimination Promotion: adequate fluid intake promoted  Medication Review/Management: medications  reviewed  Taken 5/16/2025 0930 by Maeve Hobson RN  Bowel Elimination Promotion: adequate fluid intake promoted  Medication Review/Management: medications reviewed     Problem: Infection  Goal: Absence of Infection Signs and Symptoms  Outcome: Progressing  Intervention: Prevent or Manage Infection  Recent Flowsheet Documentation  Taken 5/16/2025 1342 by Maeve Hobson RN  Isolation Precautions: enteric precautions maintained  Taken 5/16/2025 0930 by Maeve Hobson RN  Isolation Precautions: enteric precautions maintained     Problem: Sepsis/Septic Shock  Goal: Optimal Coping  Outcome: Progressing  Intervention: Optimize Psychosocial Adjustment to Illness  Recent Flowsheet Documentation  Taken 5/16/2025 1342 by Maeve Hobson RN  Family/Support System Care: self-care encouraged  Taken 5/16/2025 0930 by Maeve Hobson RN  Family/Support System Care: self-care encouraged  Goal: Absence of Bleeding  Outcome: Progressing  Goal: Blood Glucose Level Within Targeted Range  Outcome: Progressing  Goal: Absence of Infection Signs and Symptoms  Outcome: Progressing  Intervention: Initiate Sepsis Management  Recent Flowsheet Documentation  Taken 5/16/2025 1342 by Maeve Hobson RN  Infection Prevention: hand hygiene promoted  Isolation Precautions: enteric precautions maintained  Taken 5/16/2025 0930 by Maeve Hobson RN  Infection Prevention: hand hygiene promoted  Isolation Precautions: enteric precautions maintained  Intervention: Promote Stabilization  Recent Flowsheet Documentation  Taken 5/16/2025 1342 by Maeve Hobson RN  Fluid/Electrolyte Management: fluids restricted  Taken 5/16/2025 0930 by Maeve Hobson RN  Fluid/Electrolyte Management: fluids restricted  Intervention: Promote Recovery  Recent Flowsheet Documentation  Taken 5/16/2025 1342 by Maeve Hobson RN  Airway/Ventilation Management: oxygen therapy provided  Activity Management: activity adjusted  per tolerance  Taken 5/16/2025 0930 by Maeve Hobson RN  Airway/Ventilation Management: oxygen therapy provided  Activity Management: activity adjusted per tolerance  Goal: Optimal Nutrition Intake  Outcome: Progressing     Problem: Gas Exchange Impaired  Goal: Optimal Gas Exchange  Outcome: Progressing  Intervention: Optimize Oxygenation and Ventilation  Recent Flowsheet Documentation  Taken 5/16/2025 1342 by Maeve Hobson RN  Airway/Ventilation Management: oxygen therapy provided  Taken 5/16/2025 0930 by Maeve Hobson RN  Airway/Ventilation Management: oxygen therapy provided     Problem: Heart Failure  Goal: Optimal Coping  Outcome: Progressing  Intervention: Support Psychosocial Response  Recent Flowsheet Documentation  Taken 5/16/2025 1342 by Maeve Hobson RN  Family/Support System Care: self-care encouraged  Taken 5/16/2025 0930 by Maeve Hobson RN  Family/Support System Care: self-care encouraged  Goal: Optimal Cardiac Output  Outcome: Progressing  Intervention: Optimize Cardiac Output  Recent Flowsheet Documentation  Taken 5/16/2025 1342 by Maeve Hobson RN  Environmental Support: calm environment promoted  Taken 5/16/2025 0930 by Maeve Hobson RN  Environmental Support: calm environment promoted  Goal: Stable Heart Rate and Rhythm  Outcome: Progressing  Goal: Fluid and Electrolyte Balance  Outcome: Progressing  Intervention: Monitor and Manage Fluid and Electrolyte Balance  Recent Flowsheet Documentation  Taken 5/16/2025 1342 by Maeve Hobson RN  Fluid/Electrolyte Management: fluids restricted  Taken 5/16/2025 0930 by Maeve Hobson RN  Fluid/Electrolyte Management: fluids restricted  Goal: Optimal Functional Ability  Outcome: Progressing  Intervention: Optimize Functional Ability  Recent Flowsheet Documentation  Taken 5/16/2025 1342 by Maeve Hobson RN  Activity Management: activity adjusted per tolerance  Taken 5/16/2025 0930 by Jazlyn  Maeve WADE RN  Activity Management: activity adjusted per tolerance  Goal: Improved Oral Intake  Outcome: Progressing  Goal: Effective Oxygenation and Ventilation  Outcome: Progressing  Intervention: Promote Airway Secretion Clearance  Recent Flowsheet Documentation  Taken 5/16/2025 1342 by Maeve Hobson RN  Cough And Deep Breathing: done independently per patient  Activity Management: activity adjusted per tolerance  Taken 5/16/2025 0930 by Maeve Hobson RN  Cough And Deep Breathing: done independently per patient  Activity Management: activity adjusted per tolerance  Intervention: Optimize Oxygenation and Ventilation  Recent Flowsheet Documentation  Taken 5/16/2025 1342 by Maeve Hobson RN  Airway/Ventilation Management: oxygen therapy provided  Taken 5/16/2025 0930 by Maeve Hobson RN  Airway/Ventilation Management: oxygen therapy provided  Goal: Effective Breathing Pattern During Sleep  Outcome: Progressing  Intervention: Monitor and Manage Obstructive Sleep Apnea  Recent Flowsheet Documentation  Taken 5/16/2025 1342 by Maeve Hobson RN  Medication Review/Management: medications reviewed  Taken 5/16/2025 0930 by Maeve Hobson RN  Medication Review/Management: medications reviewed   Goal Outcome Evaluation:       Patient is alert and able to let her needs be known. Denies pain when asked. VSS and SR with BBB/first degree block on the monitor. Cardiology following, stopped dobutamine drip per orders. Continue Oral bumex for diuresis. Purwick in place with good urine output. ID following,continue antibiotics. Continue plan of care.

## 2025-05-16 NOTE — PLAN OF CARE
[facility] care 1900 to 0730. A&O x 4. Assist x 2, Elidia lift. Tele is sinus rhythm w/ 1st AVB & BBB. Denies pain. 3L O2 via nasal cannula when awake. BiPAP in place overnight. Purewick in place to suction, up to bedpan. Dobutamine gtt @ 5 mL/hr. Call light within reach, able to make needs known. Bed alarm on for safety.    Problem: Comorbidity Management  Goal: Blood Pressure in Desired Range  Intervention: Maintain Blood Pressure Management  Recent Flowsheet Documentation  Taken 5/16/2025 0445 by Savannah Camejo RN  Medication Review/Management: medications reviewed  Taken 5/16/2025 0045 by Savannah Camejo RN  Medication Review/Management: medications reviewed  Taken 5/15/2025 2146 by Savannah Camejo RN  Medication Review/Management: medications reviewed     Problem: Comorbidity Management  Goal: Maintenance of Heart Failure Symptom Control  Intervention: Maintain Heart Failure Management  Recent Flowsheet Documentation  Taken 5/16/2025 0445 by Savannah Camejo RN  Medication Review/Management: medications reviewed  Taken 5/16/2025 0045 by Savannah Camejo RN  Medication Review/Management: medications reviewed  Taken 5/15/2025 2146 by Savannah Camejo RN  Medication Review/Management: medications reviewed

## 2025-05-16 NOTE — PROGRESS NOTES
[facility]    Medicine Progress Note - Hospitalist Service    Date of Admission:  5/8/2025    Assessment & Plan   49-year-old female with pulmonary HTN, chronic respiratory failure and recent DVT presented with shortness of breath and weakness and found to be septic and hypoxic with LLE cellulitis and decompensated heart failure.      Severe sepsis with endorgan dysfunction involving lactic acidosis  Worsened respiratory failure  Gram-negative bacteremia  Cellulitis, LLE  Recent shingles, L flank  Candida intertrigo  Blood culture grew pan-sensitive Pseudomonas  MRSA screen negative  CT showed no abscess  ID consulted- stopped micafungin and zosyn, switched to PO levaquin  and PO vanc      Acute on chronic HFpEF  Critical pulmonary hypertension  Acute on chronic respiratory failure with hypoxia and hypercapnia  S/p 10 L oximask, BiPAP, now weaned to nasal cannula.  Sleeping on 3 L of oxygen via nasal cannula which is her baseline.  PTA spironolactone, Jardiance  CTA chest shows no PE  Scheduled K replacement while on IV lasix, monitor K level     Recent DVT, LLE  Chronic lymphedema  PTA Eliquis  Bumex 6 mg oral daily  Lymph team consulted     Prediabetes  Hypoglycemia, resolved  Stopped SSI and Accu-Cheks     Morbid obesity  BMI 50          Diet: Low Saturated Fat Na <2400 mg  Fluid restriction 1800 ML FLUID    DVT Prophylaxis: DOAC  Brar Catheter: Not present  Lines: None     Cardiac Monitoring: ACTIVE order. Indication: Acute decompensated heart failure (48 hours)  Code Status: Full Code      Clinically Significant Risk Factors            # Hypercalcemia: Highest Ca = 10.6 mg/dL in last 2 days, will monitor as appropriate        # Hypertension: Noted on problem list  # Acute heart failure with preserved ejection fraction: heart failure noted on problem list, last echo with EF >50%, and receiving IV diuretics           # Morbid Obesity: Estimated body mass index is 43.32 kg/m  as  "calculated from the following:    Height as of this encounter: 1.626 m (5' 4\").    Weight as of this encounter: 114.5 kg (252 lb 6.4 oz).      # Financial/Environmental Concerns: none         Social Drivers of Health    Food Insecurity: Low Risk  (5/8/2025)    Food Insecurity     Within the past 12 months, did you worry that your food would run out before you got money to buy more?: No     Within the past 12 months, did the food you bought just not last and you didn t have money to get more?: No   Recent Concern: Food Insecurity - High Risk (3/23/2025)    Food Insecurity     Within the past 12 months, did you worry that your food would run out before you got money to buy more?: Yes     Within the past 12 months, did the food you bought just not last and you didn t have money to get more?: No   Housing Stability: High Risk (5/8/2025)    Housing Stability     Do you have housing? : No     Are you worried about losing your housing?: No   Transportation Needs: High Risk (5/8/2025)    Transportation Needs     Within the past 12 months, has lack of transportation kept you from medical appointments, getting your medicines, non-medical meetings or appointments, work, or from getting things that you need?: Yes   Physical Activity: Inactive (3/21/2024)    Exercise Vital Sign     Days of Exercise per Week: 0 days     Minutes of Exercise per Session: 0 min   Interpersonal Safety: Low Risk  (5/8/2025)    Interpersonal Safety     Do you feel physically and emotionally safe where you currently live?: Yes     Within the past 12 months, have you been hit, slapped, kicked or otherwise physically hurt by someone?: No     Within the past 12 months, have you been humiliated or emotionally abused in other ways by your partner or ex-partner?: No   Recent Concern: Interpersonal Safety - High Risk (3/23/2025)    Interpersonal Safety     Do you feel physically and emotionally safe where you currently live?: No     Within the past 12 months, " have you been hit, slapped, kicked or otherwise physically hurt by someone?: No     Within the past 12 months, have you been humiliated or emotionally abused in other ways by your partner or ex-partner?: No   Social Connections: Unknown (3/1/2022)    Social Connection and Isolation Panel [NHANES]     Frequency of Communication with Friends and Family: Twice a week     Frequency of Social Gatherings with Friends and Family: Twice a week          Disposition Plan     Medically Ready for Discharge: Anticipated Tomorrow         Leigh Hardy MD  Hospitalist Service  [facility]  Securely message with [employer] (more info)  Text page via [facility] Paging/Directory   ______________________________________________________________________    Interval History   No distress noted. She is at her baseline respiratory status. No new complaints . She is medically to discharge to TCU once available     Physical Exam   Vital Signs: Temp: 97.6  F (36.4  C) Temp src: Oral BP: 101/68 Pulse: 86   Resp: 16 SpO2: 96 % O2 Device: Nasal cannula Oxygen Delivery: 3 LPM  Weight: 252 lbs 6.4 oz    General Appearance:  No distress noted, chronically sick looking  Respiratory: Diminished air entry bilaterally basally   Cardiovascular: S1-S2 were heard, no murmur or gallop  GI: Soft abdomen, no tenderness, normoactive bowel sounds  HAILEY: Bilateral lower extremity lymphedema       Medical Decision Making       40 MINUTES SPENT BY ME on the date of service doing chart review, history, exam, documentation & further activities per the note.      Data

## 2025-05-16 NOTE — PROGRESS NOTES
Care Management Follow Up    Length of Stay (days): 8    Expected Discharge Date: 05/19/2025    Anticipated Discharge Plan:  Transitional Care- [facility]    Transportation: Anticipate medical transport    PT Recommendations: Transitional Care Facility  OT Recommendations:  Transitional Care Facility     Barriers to Discharge: placement    Prior Living Situation: town home with alone    Discussed  Partnership in Safe Discharge Planning  document with patient/family: No     Handoff Completed: No, handoff not indicated or clinically appropriate    Patient/Spokesperson Updated: Yes. Who? patient    Additional Information:  [facility] accepted to [facility].   No weekend admissions, bed is available on 5/19/2025.  Patient updated at bedside.   She reports she typically uses a medical wheelchair ride at discharge.    Alicia Bledsoe, ROSELYNSW

## 2025-05-17 ENCOUNTER — APPOINTMENT (OUTPATIENT)
Dept: PHYSICAL THERAPY | Facility: HOSPITAL | Age: 50
End: 2025-05-17
Payer: COMMERCIAL

## 2025-05-17 LAB
ANION GAP SERPL CALCULATED.3IONS-SCNC: 4 MMOL/L (ref 7–15)
BUN SERPL-MCNC: 18.8 MG/DL (ref 6–20)
CALCIUM SERPL-MCNC: 10.2 MG/DL (ref 8.8–10.4)
CHLORIDE SERPL-SCNC: 102 MMOL/L (ref 98–107)
CREAT SERPL-MCNC: 0.82 MG/DL (ref 0.51–0.95)
EGFRCR SERPLBLD CKD-EPI 2021: 87 ML/MIN/1.73M2
GLUCOSE SERPL-MCNC: 87 MG/DL (ref 70–99)
HCO3 SERPL-SCNC: 33 MMOL/L (ref 22–29)
HOLD SPECIMEN: NORMAL
POTASSIUM SERPL-SCNC: 3.6 MMOL/L (ref 3.4–5.3)
SODIUM SERPL-SCNC: 139 MMOL/L (ref 135–145)

## 2025-05-17 PROCEDURE — 120N000004 HC R&B MS OVERFLOW

## 2025-05-17 PROCEDURE — 999N000157 HC STATISTIC RCP TIME EA 10 MIN

## 2025-05-17 PROCEDURE — 97530 THERAPEUTIC ACTIVITIES: CPT | Mod: GP

## 2025-05-17 PROCEDURE — 97110 THERAPEUTIC EXERCISES: CPT | Mod: GP

## 2025-05-17 PROCEDURE — 94660 CPAP INITIATION&MGMT: CPT

## 2025-05-17 PROCEDURE — 250N000013 HC RX MED GY IP 250 OP 250 PS 637: Performed by: STUDENT IN AN ORGANIZED HEALTH CARE EDUCATION/TRAINING PROGRAM

## 2025-05-17 PROCEDURE — 250N000013 HC RX MED GY IP 250 OP 250 PS 637: Performed by: INTERNAL MEDICINE

## 2025-05-17 PROCEDURE — 250N000013 HC RX MED GY IP 250 OP 250 PS 637: Performed by: HOSPITALIST

## 2025-05-17 PROCEDURE — 82310 ASSAY OF CALCIUM: CPT | Performed by: STUDENT IN AN ORGANIZED HEALTH CARE EDUCATION/TRAINING PROGRAM

## 2025-05-17 PROCEDURE — 99232 SBSQ HOSP IP/OBS MODERATE 35: CPT | Performed by: STUDENT IN AN ORGANIZED HEALTH CARE EDUCATION/TRAINING PROGRAM

## 2025-05-17 PROCEDURE — 36415 COLL VENOUS BLD VENIPUNCTURE: CPT | Performed by: STUDENT IN AN ORGANIZED HEALTH CARE EDUCATION/TRAINING PROGRAM

## 2025-05-17 RX ORDER — LACTOBACILLUS RHAMNOSUS GG 10B CELL
1 CAPSULE ORAL 2 TIMES DAILY
Status: DISCONTINUED | OUTPATIENT
Start: 2025-05-17 | End: 2025-05-19 | Stop reason: HOSPADM

## 2025-05-17 RX ADMIN — SIMVASTATIN 10 MG: 10 TABLET, FILM COATED ORAL at 20:12

## 2025-05-17 RX ADMIN — MICONAZOLE NITRATE: 20 POWDER TOPICAL at 20:12

## 2025-05-17 RX ADMIN — Medication 1 CAPSULE: at 20:12

## 2025-05-17 RX ADMIN — APIXABAN 5 MG: 5 TABLET, FILM COATED ORAL at 20:11

## 2025-05-17 RX ADMIN — ESCITALOPRAM OXALATE 10 MG: 10 TABLET ORAL at 09:17

## 2025-05-17 RX ADMIN — MICONAZOLE NITRATE 1 G: 20 POWDER TOPICAL at 09:21

## 2025-05-17 RX ADMIN — APIXABAN 5 MG: 5 TABLET, FILM COATED ORAL at 09:18

## 2025-05-17 RX ADMIN — EMPAGLIFLOZIN 10 MG: 10 TABLET, FILM COATED ORAL at 09:18

## 2025-05-17 RX ADMIN — ASPIRIN 81 MG: 81 TABLET, COATED ORAL at 09:17

## 2025-05-17 RX ADMIN — Medication 1 CAPSULE: at 12:21

## 2025-05-17 RX ADMIN — SPIRONOLACTONE 25 MG: 25 TABLET, FILM COATED ORAL at 09:18

## 2025-05-17 RX ADMIN — POTASSIUM CHLORIDE 20 MEQ: 1500 TABLET, EXTENDED RELEASE ORAL at 20:11

## 2025-05-17 RX ADMIN — BUMETANIDE 6 MG: 2 TABLET ORAL at 09:17

## 2025-05-17 RX ADMIN — VANCOMYCIN HYDROCHLORIDE 125 MG: 125 CAPSULE ORAL at 09:18

## 2025-05-17 RX ADMIN — POTASSIUM CHLORIDE 20 MEQ: 1500 TABLET, EXTENDED RELEASE ORAL at 09:18

## 2025-05-17 RX ADMIN — LEVOFLOXACIN 750 MG: 750 TABLET, FILM COATED ORAL at 16:06

## 2025-05-17 ASSESSMENT — ACTIVITIES OF DAILY LIVING (ADL)
ADLS_ACUITY_SCORE: 69
ADLS_ACUITY_SCORE: 68
ADLS_ACUITY_SCORE: 69
ADLS_ACUITY_SCORE: 67
ADLS_ACUITY_SCORE: 68
ADLS_ACUITY_SCORE: 69
ADLS_ACUITY_SCORE: 67
ADLS_ACUITY_SCORE: 69
ADLS_ACUITY_SCORE: 67
ADLS_ACUITY_SCORE: 69
ADLS_ACUITY_SCORE: 67
ADLS_ACUITY_SCORE: 69
ADLS_ACUITY_SCORE: 67
ADLS_ACUITY_SCORE: 68
ADLS_ACUITY_SCORE: 69
ADLS_ACUITY_SCORE: 68

## 2025-05-17 NOTE — PROGRESS NOTES
Heart Failure Care Map  GOALS TO BE MET BEFORE DISCHARGE:    1. Decrease congestion and/or edema with diuretic therapy to achieve near optimal volume status.     Dyspnea improved: Yes, satisfactory for discharge.   Edema improved: Yes, satisfactory for discharge.        Last 24 hour I/O:   Intake/Output Summary (Last 24 hours) at 5/17/2025 0111  Last data filed at 5/16/2025 2350  Gross per 24 hour   Intake 593 ml   Output 2550 ml   Net -1957 ml           Net I/O and Weights since admission:   04/17 0700 - 05/17 0659  In: 7986.56 [P.O.:7060; I.V.:726.56]  Out: 67043 [Urine:60599]  Net: -97687.44     Vitals:    05/09/25 0825 05/11/25 0344 05/12/25 0350 05/15/25 0848   Weight: 134.2 kg (295 lb 13.7 oz) 130.2 kg (287 lb 0.6 oz) (!) 139.1 kg (306 lb 10.6 oz) 114.5 kg (252 lb 6.4 oz)       2.  O2 sats > 90% on room air, or at prior home O2 therapy level.      Able to wean O2 this shift to keep sats above 90%?: No, further care required to meet this goal. Please explain . Pt is still on 3 litters of oxygen, home CPAP, presently on BIPAP.    Does patient use Home O2? No          Current oxygenation status:   SpO2: 96 %     O2 Device: Nasal cannula, Oxygen Delivery: 3 LPM    3.  Tolerates ambulation and mobility near baseline.     Ambulation: No, further care required to meet this goal. Please explain Pt does have cellulitis of the lower extremities that decreases her ability to walk.     Times patient ambulated with staff this shift: 0    Please review the Heart Failure Care Map for additional HF goal outcomes.    Jcarlos Momin RN  5/17/2025

## 2025-05-17 NOTE — PROGRESS NOTES
"[facility]    Medicine Progress Note - Hospitalist Service    Date of Admission:  5/8/2025    Assessment & Plan   49-year-old female with pulmonary HTN, chronic respiratory failure and recent DVT presented with shortness of breath and weakness and found to be septic and hypoxic with LLE cellulitis and decompensated heart failure.      Severe sepsis with endorgan dysfunction involving lactic acidosis, improved   Worsened respiratory failure, improved   Gram-negative bacteremia  Cellulitis, LLE  Recent shingles, L flank  Candida intertrigo  Blood culture grew pan-sensitive Pseudomonas  MRSA screen negative  CT showed no abscess  ID consulted- stopped micafungin and zosyn, switched to PO levaquin  and PO vanc      Acute on chronic HFpEF  Critical pulmonary hypertension  Acute on chronic respiratory failure with hypoxia and hypercapnia  S/p 10 L oximask, BiPAP, now weaned to nasal cannula.  Sleeping on 3 L of oxygen via nasal cannula which is her baseline.  PTA spironolactone, Jardiance  CTA chest shows no PE       Recent DVT, LLE  Chronic lymphedema  PTA Eliquis  Bumex 6 mg oral daily  Lymph team consulted     Prediabetes  Hypoglycemia, resolved  Stopped SSI and Accu-Cheks     Morbid obesity  BMI 50          Diet: Low Saturated Fat Na <2400 mg  Fluid restriction 1800 ML FLUID    DVT Prophylaxis: DOAC  Brar Catheter: Not present  Lines: None     Cardiac Monitoring: None  Code Status: Full Code      Clinically Significant Risk Factors            # Hypercalcemia: Highest Ca = 10.6 mg/dL in last 2 days, will monitor as appropriate        # Hypertension: Noted on problem list  # Acute heart failure with preserved ejection fraction: heart failure noted on problem list, last echo with EF >50%, and receiving IV diuretics           # Morbid Obesity: Estimated body mass index is 43.31 kg/m  as calculated from the following:    Height as of this encounter: 1.626 m (5' 4\").    Weight as of this encounter: " 114.4 kg (252 lb 4.8 oz).      # Financial/Environmental Concerns: none         Social Drivers of Health    Food Insecurity: Low Risk  (5/8/2025)    Food Insecurity     Within the past 12 months, did you worry that your food would run out before you got money to buy more?: No     Within the past 12 months, did the food you bought just not last and you didn t have money to get more?: No   Recent Concern: Food Insecurity - High Risk (3/23/2025)    Food Insecurity     Within the past 12 months, did you worry that your food would run out before you got money to buy more?: Yes     Within the past 12 months, did the food you bought just not last and you didn t have money to get more?: No   Housing Stability: High Risk (5/8/2025)    Housing Stability     Do you have housing? : No     Are you worried about losing your housing?: No   Transportation Needs: High Risk (5/8/2025)    Transportation Needs     Within the past 12 months, has lack of transportation kept you from medical appointments, getting your medicines, non-medical meetings or appointments, work, or from getting things that you need?: Yes   Physical Activity: Inactive (3/21/2024)    Exercise Vital Sign     Days of Exercise per Week: 0 days     Minutes of Exercise per Session: 0 min   Interpersonal Safety: Low Risk  (5/8/2025)    Interpersonal Safety     Do you feel physically and emotionally safe where you currently live?: Yes     Within the past 12 months, have you been hit, slapped, kicked or otherwise physically hurt by someone?: No     Within the past 12 months, have you been humiliated or emotionally abused in other ways by your partner or ex-partner?: No   Recent Concern: Interpersonal Safety - High Risk (3/23/2025)    Interpersonal Safety     Do you feel physically and emotionally safe where you currently live?: No     Within the past 12 months, have you been hit, slapped, kicked or otherwise physically hurt by someone?: No     Within the past 12 months,  have you been humiliated or emotionally abused in other ways by your partner or ex-partner?: No   Social Connections: Unknown (3/1/2022)    Social Connection and Isolation Panel [NHANES]     Frequency of Communication with Friends and Family: Twice a week     Frequency of Social Gatherings with Friends and Family: Twice a week          Disposition Plan     Medically Ready for Discharge: Ready Now             Leigh Hardy MD  Hospitalist Service  [facility]  Securely message with [employer] (more info)  Text page via [employer] Paging/Directory   ______________________________________________________________________    Interval History   No distress noted.  No new complaints.  She is medically ready for discharge once TCU placement is arranged.    Physical Exam   Vital Signs: Temp: 97.8  F (36.6  C) Temp src: Oral BP: 100/63 Pulse: 81   Resp: 16 SpO2: 95 % O2 Device: Nasal cannula Oxygen Delivery: 3 LPM  Weight: 252 lbs 4.8 oz    General Appearance:  No distress noted, chronically sick looking  Respiratory: Diminished air entry bilaterally basally   Cardiovascular: S1-S2 were heard, no murmur or gallop  GI: Soft abdomen, no tenderness, normoactive bowel sounds  HAILEY: Bilateral lower extremity lymphedema    Medical Decision Making       40 MINUTES SPENT BY ME on the date of service doing chart review, history, exam, documentation & further activities per the note.      Data

## 2025-05-17 NOTE — PLAN OF CARE
Problem: Adult Inpatient Plan of Care  Goal: Optimal Comfort and Wellbeing  Outcome: Progressing   Goal Outcome Evaluation:      Plan of Care Reviewed With: patient     Pt denied pain today.  On 3 L O2 and states this is her baseline at home.  Is able to shift her weight in bed on her own.  Pt asked for Lactobacillus which was ordered and given.  Cardiac monitoring discontinued.  Plan for TCU on Monday.  Will continue to monitor pt for any changes.

## 2025-05-17 NOTE — PLAN OF CARE
Assumed care 1500 to 2330. A&O x 4. Assist x 1 with a gait belt and walker, couple of steps to chair. Tele is sinus rhythm w/ 1st AVB & BBB. Denies pain. Up to chair, back to bed. Purewick in place to suction. 3L O2 via nasal cannula. Call light within reach, able to make needs known. Bed alarm on for safety.    Problem: Pain Acute  Goal: Optimal Pain Control and Function  Intervention: Prevent or Manage Pain  Recent Flowsheet Documentation  Taken 5/16/2025 2000 by Savannah Camejo RN  Sensory Stimulation Regulation: television on  Medication Review/Management: medications reviewed  Taken 5/16/2025 1607 by Savannah Camejo RN  Sensory Stimulation Regulation: television on  Medication Review/Management: medications reviewed     Problem: Comorbidity Management  Goal: Blood Pressure in Desired Range  Intervention: Maintain Blood Pressure Management  Recent Flowsheet Documentation  Taken 5/16/2025 2000 by Savannah Camejo RN  Medication Review/Management: medications reviewed  Taken 5/16/2025 1607 by Savannah Camejo RN  Medication Review/Management: medications reviewed

## 2025-05-18 PROCEDURE — 250N000013 HC RX MED GY IP 250 OP 250 PS 637: Performed by: HOSPITALIST

## 2025-05-18 PROCEDURE — 999N000215 HC STATISTIC HFNC ADULT NON-CPAP

## 2025-05-18 PROCEDURE — 250N000013 HC RX MED GY IP 250 OP 250 PS 637: Performed by: INTERNAL MEDICINE

## 2025-05-18 PROCEDURE — 120N000004 HC R&B MS OVERFLOW

## 2025-05-18 PROCEDURE — 250N000013 HC RX MED GY IP 250 OP 250 PS 637: Performed by: STUDENT IN AN ORGANIZED HEALTH CARE EDUCATION/TRAINING PROGRAM

## 2025-05-18 PROCEDURE — 999N000157 HC STATISTIC RCP TIME EA 10 MIN

## 2025-05-18 PROCEDURE — 99232 SBSQ HOSP IP/OBS MODERATE 35: CPT | Performed by: STUDENT IN AN ORGANIZED HEALTH CARE EDUCATION/TRAINING PROGRAM

## 2025-05-18 PROCEDURE — 272N000054 HC CANNULA HIGH FLOW, ADULT

## 2025-05-18 PROCEDURE — 94660 CPAP INITIATION&MGMT: CPT

## 2025-05-18 RX ADMIN — POTASSIUM CHLORIDE 20 MEQ: 1500 TABLET, EXTENDED RELEASE ORAL at 09:04

## 2025-05-18 RX ADMIN — ESCITALOPRAM OXALATE 10 MG: 10 TABLET ORAL at 09:04

## 2025-05-18 RX ADMIN — POTASSIUM CHLORIDE 20 MEQ: 1500 TABLET, EXTENDED RELEASE ORAL at 20:49

## 2025-05-18 RX ADMIN — SPIRONOLACTONE 25 MG: 25 TABLET, FILM COATED ORAL at 09:04

## 2025-05-18 RX ADMIN — MICONAZOLE NITRATE: 20 POWDER TOPICAL at 09:09

## 2025-05-18 RX ADMIN — APIXABAN 5 MG: 5 TABLET, FILM COATED ORAL at 09:04

## 2025-05-18 RX ADMIN — ASPIRIN 81 MG: 81 TABLET, COATED ORAL at 09:04

## 2025-05-18 RX ADMIN — Medication 1 CAPSULE: at 20:49

## 2025-05-18 RX ADMIN — EMPAGLIFLOZIN 10 MG: 10 TABLET, FILM COATED ORAL at 09:04

## 2025-05-18 RX ADMIN — SIMVASTATIN 10 MG: 10 TABLET, FILM COATED ORAL at 20:49

## 2025-05-18 RX ADMIN — BUMETANIDE 6 MG: 2 TABLET ORAL at 09:04

## 2025-05-18 RX ADMIN — Medication 1 CAPSULE: at 09:04

## 2025-05-18 RX ADMIN — APIXABAN 5 MG: 5 TABLET, FILM COATED ORAL at 20:49

## 2025-05-18 RX ADMIN — MICONAZOLE NITRATE: 20 POWDER TOPICAL at 21:02

## 2025-05-18 RX ADMIN — LEVOFLOXACIN 750 MG: 750 TABLET, FILM COATED ORAL at 14:23

## 2025-05-18 RX ADMIN — VANCOMYCIN HYDROCHLORIDE 125 MG: 125 CAPSULE ORAL at 09:04

## 2025-05-18 ASSESSMENT — ACTIVITIES OF DAILY LIVING (ADL)
ADLS_ACUITY_SCORE: 63
ADLS_ACUITY_SCORE: 65
ADLS_ACUITY_SCORE: 63
ADLS_ACUITY_SCORE: 69
ADLS_ACUITY_SCORE: 63
ADLS_ACUITY_SCORE: 69
ADLS_ACUITY_SCORE: 65
ADLS_ACUITY_SCORE: 65
ADLS_ACUITY_SCORE: 69
ADLS_ACUITY_SCORE: 65
ADLS_ACUITY_SCORE: 65
ADLS_ACUITY_SCORE: 69
ADLS_ACUITY_SCORE: 63
ADLS_ACUITY_SCORE: 69
ADLS_ACUITY_SCORE: 65
ADLS_ACUITY_SCORE: 63

## 2025-05-18 NOTE — PLAN OF CARE
Problem: Adult Inpatient Plan of Care  Goal: Plan of Care Review  Description: The Plan of Care Review/Shift note should be completed every shift.  The Outcome Evaluation is a brief statement about your assessment that the patient is improving, declining, or no change.  This information will be displayed automatically on your shiftnote.  Outcome: Progressing     Problem: Adult Inpatient Plan of Care  Goal: Absence of Hospital-Acquired Illness or Injury  Intervention: Identify and Manage Fall Risk  Recent Flowsheet Documentation  Taken 5/17/2025 1600 by Bharath Vernon RN  Safety Promotion/Fall Prevention:   activity supervised   assistive device/personal items within reach   clutter free environment maintained   increased rounding and observation   increase visualization of patient   lighting adjusted   mobility aid in reach   nonskid shoes/slippers when out of bed   patient and family education   room organization consistent   safety round/check completed   supervised activity   room near nurse's station   room door open   toileting scheduled     Problem: Adult Inpatient Plan of Care  Goal: Optimal Comfort and Wellbeing  Outcome: Progressing   Goal Outcome Evaluation:    A & O x 4, VSS, on 3 L NC, tele discontinued. Denied pain. Up from bed to chair with assist x 1. Plan to discharge to TCU on Monday. Pleasant and cooperative.

## 2025-05-18 NOTE — PLAN OF CARE
Problem: Adult Inpatient Plan of Care  Goal: Absence of Hospital-Acquired Illness or Injury  Intervention: Identify and Manage Fall Risk  Recent Flowsheet Documentation  Taken 5/17/2025 2300 by Bharath Vernon, RN  Safety Promotion/Fall Prevention:   activity supervised   assistive device/personal items within reach   clutter free environment maintained   increased rounding and observation   increase visualization of patient   lighting adjusted   mobility aid in reach   nonskid shoes/slippers when out of bed   patient and family education   room organization consistent   safety round/check completed   supervised activity   room near nurse's station   room door open   toileting scheduled     Problem: Adult Inpatient Plan of Care  Goal: Optimal Comfort and Wellbeing  5/18/2025 0625 by Bharath Vernon, RN  Outcome: Progressing  5/17/2025 2146 by Bharath Vernon, RN  Outcome: Progressing   Goal Outcome Evaluation:    A & O x 4, VSS, on cpap at bedtime. Denied pain, slept between cares. No acute events overnight.

## 2025-05-18 NOTE — PLAN OF CARE
Problem: Skin Injury Risk Increased  Goal: Skin Health and Integrity  Outcome: Progressing  Intervention: Plan: Nurse Driven Intervention: Moisture Management  Recent Flowsheet Documentation  Taken 5/18/2025 0900 by Nessa Tinoco, RN  Moisture Interventions:   Encourage regular toileting   Urinary collection device   Perineal cleanser   Barrier ointment (CriticAid, Triad paste)   No brief in bed  Intervention: Plan: Nurse Driven Intervention: Friction and Shear  Recent Flowsheet Documentation  Taken 5/18/2025 0900 by Nessa Tinoco, RN  Friction/Shear Interventions:   HOB 30 degrees or less   Silicone foam sacral dressing   Goal Outcome Evaluation:               Has small wound in left groin, wound cleansed with soap and water and mepilex applied, panus area tender, also cleansed and antifungal powder applied

## 2025-05-18 NOTE — PROGRESS NOTES
"[facility]    Medicine Progress Note - Hospitalist Service    Date of Admission:  5/8/2025    Assessment & Plan   49-year-old female with pulmonary HTN, chronic respiratory failure and recent DVT presented with shortness of breath and weakness and found to be septic and hypoxic with LLE cellulitis and decompensated heart failure.      Severe sepsis with endorgan dysfunction involving lactic acidosis, improved   Worsened respiratory failure, improved   Gram-negative bacteremia  Cellulitis, LLE  Recent shingles, L flank  Candida intertrigo  Blood culture grew pan-sensitive Pseudomonas  MRSA screen negative  CT showed no abscess  ID consulted- stopped micafungin and zosyn, switched to PO levaquin  and PO vanc      Acute on chronic HFpEF  Critical pulmonary hypertension  Acute on chronic respiratory failure with hypoxia and hypercapnia  S/p 10 L oximask, BiPAP, now weaned to nasal cannula.  Sleeping on 3 L of oxygen via nasal cannula which is her baseline.  PTA spironolactone, Jardiance  CTA chest shows no PE       Recent DVT, LLE  Chronic lymphedema  PTA Eliquis  Bumex 6 mg oral daily     Prediabetes  Hypoglycemia, resolved  Stopped SSI and Accu-Cheks     Morbid obesity  BMI 50          Diet: Low Saturated Fat Na <2400 mg  Fluid restriction 1800 ML FLUID    DVT Prophylaxis: DOAC  Brar Catheter: Not present  Lines: None     Cardiac Monitoring: None  Code Status: Full Code      Clinically Significant Risk Factors                   # Hypertension: Noted on problem list  # Chronic heart failure with preserved ejection fraction: heart failure noted on problem list and last echo with EF >50%           # Morbid Obesity: Estimated body mass index is 43.9 kg/m  as calculated from the following:    Height as of this encounter: 1.626 m (5' 4\").    Weight as of this encounter: 116 kg (255 lb 11.7 oz).      # Financial/Environmental Concerns: none         Social Drivers of Health    Food Insecurity: Low " Risk  (5/8/2025)    Food Insecurity     Within the past 12 months, did you worry that your food would run out before you got money to buy more?: No     Within the past 12 months, did the food you bought just not last and you didn t have money to get more?: No   Recent Concern: Food Insecurity - High Risk (3/23/2025)    Food Insecurity     Within the past 12 months, did you worry that your food would run out before you got money to buy more?: Yes     Within the past 12 months, did the food you bought just not last and you didn t have money to get more?: No   Housing Stability: High Risk (5/8/2025)    Housing Stability     Do you have housing? : No     Are you worried about losing your housing?: No   Transportation Needs: High Risk (5/8/2025)    Transportation Needs     Within the past 12 months, has lack of transportation kept you from medical appointments, getting your medicines, non-medical meetings or appointments, work, or from getting things that you need?: Yes   Physical Activity: Inactive (3/21/2024)    Exercise Vital Sign     Days of Exercise per Week: 0 days     Minutes of Exercise per Session: 0 min   Interpersonal Safety: Low Risk  (5/8/2025)    Interpersonal Safety     Do you feel physically and emotionally safe where you currently live?: Yes     Within the past 12 months, have you been hit, slapped, kicked or otherwise physically hurt by someone?: No     Within the past 12 months, have you been humiliated or emotionally abused in other ways by your partner or ex-partner?: No   Recent Concern: Interpersonal Safety - High Risk (3/23/2025)    Interpersonal Safety     Do you feel physically and emotionally safe where you currently live?: No     Within the past 12 months, have you been hit, slapped, kicked or otherwise physically hurt by someone?: No     Within the past 12 months, have you been humiliated or emotionally abused in other ways by your partner or ex-partner?: No   Social Connections: Unknown  (3/1/2022)    Social Connection and Isolation Panel [NHANES]     Frequency of Communication with Friends and Family: Twice a week     Frequency of Social Gatherings with Friends and Family: Twice a week          Disposition Plan     Medically Ready for Discharge: Ready Now             Leigh Hardy MD  Hospitalist Service  [facility]  Securely message with [employer] (more info)  Text page via [employer] Paging/Directory   ______________________________________________________________________    Interval History   No distress noted.  No new complaints.  Medically ready awaiting TCU placement.  Possible discharge tomorrow a.m.    Physical Exam   Vital Signs: Temp: 97.4  F (36.3  C) Temp src: Axillary BP: 102/66 Pulse: 73   Resp: 18 SpO2: 93 % O2 Device: Nasal cannula Oxygen Delivery: 3 LPM  Weight: 255 lbs 11.74 oz    General Appearance:  No distress noted, chronically sick looking  Respiratory: Diminished air entry bilaterally basally   Cardiovascular: S1-S2 were heard, no murmur or gallop  GI: Soft abdomen, no tenderness, normoactive bowel sounds  HAILEY: Bilateral lower extremity lymphedema       Medical Decision Making       40 MINUTES SPENT BY ME on the date of service doing chart review, history, exam, documentation & further activities per the note.      Data

## 2025-05-18 NOTE — PROGRESS NOTES
Care Management Follow Up    Length of Stay (days): 10    Expected Discharge Date: 05/19/2025    Anticipated Discharge Plan:  [facility]itional Care-[facility]    Transportation: Confirmed Wheelchair. Transportation costs discussed? Yes. Discussed with patient    PT Recommendations: Transitional Care Facility, Per plan established by the PT  OT Recommendations:  Transitional Care Facility     Barriers to Discharge: placement    Prior Living Situation: town home with alone    Discussed  Partnership in Safe Discharge Planning  document with patient/family: No     Handoff Completed: No, handoff not indicated or clinically appropriate    Patient/Spokesperson Updated: Yes. Who? patient    Additional Information:  Patient ready for discharge, bed available on 5/19/2025.  Bess updated on ride time at bedside today and states understanding.    Next Steps: Wheelchair Ride with oxygen scheduled for 1208-1253pm to [facility].     Alicia Bledsoe, LICSW

## 2025-05-19 ENCOUNTER — DOCUMENTATION ONLY (OUTPATIENT)
Dept: GERIATRICS | Facility: CLINIC | Age: 50
End: 2025-05-19
Payer: COMMERCIAL

## 2025-05-19 VITALS
BODY MASS INDEX: 40.43 KG/M2 | HEIGHT: 64 IN | TEMPERATURE: 97.8 F | WEIGHT: 236.8 LBS | SYSTOLIC BLOOD PRESSURE: 103 MMHG | DIASTOLIC BLOOD PRESSURE: 71 MMHG | RESPIRATION RATE: 16 BRPM | OXYGEN SATURATION: 96 % | HEART RATE: 82 BPM

## 2025-05-19 PROCEDURE — 250N000013 HC RX MED GY IP 250 OP 250 PS 637: Performed by: HOSPITALIST

## 2025-05-19 PROCEDURE — 99239 HOSP IP/OBS DSCHRG MGMT >30: CPT | Performed by: STUDENT IN AN ORGANIZED HEALTH CARE EDUCATION/TRAINING PROGRAM

## 2025-05-19 PROCEDURE — 250N000013 HC RX MED GY IP 250 OP 250 PS 637: Performed by: STUDENT IN AN ORGANIZED HEALTH CARE EDUCATION/TRAINING PROGRAM

## 2025-05-19 PROCEDURE — 250N000013 HC RX MED GY IP 250 OP 250 PS 637: Performed by: INTERNAL MEDICINE

## 2025-05-19 PROCEDURE — 999N000157 HC STATISTIC RCP TIME EA 10 MIN

## 2025-05-19 PROCEDURE — 94660 CPAP INITIATION&MGMT: CPT

## 2025-05-19 RX ORDER — VANCOMYCIN HYDROCHLORIDE 125 MG/1
125 CAPSULE ORAL DAILY
DISCHARGE
Start: 2025-05-19 | End: 2025-05-24

## 2025-05-19 RX ORDER — LACTOBACILLUS RHAMNOSUS GG 10B CELL
1 CAPSULE ORAL 2 TIMES DAILY
DISCHARGE
Start: 2025-05-19 | End: 2025-05-26

## 2025-05-19 RX ORDER — LEVOFLOXACIN 750 MG/1
750 TABLET, FILM COATED ORAL EVERY 24 HOURS
DISCHARGE
Start: 2025-05-19 | End: 2025-05-21

## 2025-05-19 RX ORDER — SPIRONOLACTONE 25 MG/1
25 TABLET ORAL EVERY MORNING
DISCHARGE
Start: 2025-05-19 | End: 2025-05-29

## 2025-05-19 RX ADMIN — APIXABAN 5 MG: 5 TABLET, FILM COATED ORAL at 09:26

## 2025-05-19 RX ADMIN — BUMETANIDE 6 MG: 2 TABLET ORAL at 09:26

## 2025-05-19 RX ADMIN — ANORECTAL OINTMENT: 15.7; .44; 24; 20.6 OINTMENT TOPICAL at 11:22

## 2025-05-19 RX ADMIN — POTASSIUM CHLORIDE 20 MEQ: 1500 TABLET, EXTENDED RELEASE ORAL at 09:27

## 2025-05-19 RX ADMIN — MICONAZOLE NITRATE: 20 POWDER TOPICAL at 09:29

## 2025-05-19 RX ADMIN — Medication 1 CAPSULE: at 09:26

## 2025-05-19 RX ADMIN — ESCITALOPRAM OXALATE 10 MG: 10 TABLET ORAL at 09:26

## 2025-05-19 RX ADMIN — EMPAGLIFLOZIN 10 MG: 10 TABLET, FILM COATED ORAL at 09:26

## 2025-05-19 RX ADMIN — SPIRONOLACTONE 25 MG: 25 TABLET, FILM COATED ORAL at 09:27

## 2025-05-19 RX ADMIN — ASPIRIN 81 MG: 81 TABLET, COATED ORAL at 09:26

## 2025-05-19 RX ADMIN — VANCOMYCIN HYDROCHLORIDE 125 MG: 125 CAPSULE ORAL at 09:27

## 2025-05-19 ASSESSMENT — ACTIVITIES OF DAILY LIVING (ADL)
ADLS_ACUITY_SCORE: 63
ADLS_ACUITY_SCORE: 63
ADLS_ACUITY_SCORE: 69
ADLS_ACUITY_SCORE: 68
ADLS_ACUITY_SCORE: 69
ADLS_ACUITY_SCORE: 63
ADLS_ACUITY_SCORE: 69
ADLS_ACUITY_SCORE: 69

## 2025-05-19 NOTE — PLAN OF CARE
Problem: Adult Inpatient Plan of Care  Goal: Optimal Comfort and Wellbeing  Outcome: Adequate for Care Transition     Problem: Adult Inpatient Plan of Care  Goal: Readiness for Transition of Care  Outcome: Adequate for Care Transition     Problem: Infection  Goal: Absence of Infection Signs and Symptoms  Outcome: Adequate for Care Transition     Problem: Sepsis/Septic Shock  Goal: Optimal Coping  Outcome: Adequate for Care Transition     Problem: Heart Failure  Goal: Optimal Coping  Outcome: Adequate for Care Transition     Problem: Skin Injury Risk Increased  Goal: Skin Health and Integrity  Outcome: Adequate for Care Transition     Pt A/Ox4. Denied pain and SOB. Remains on baseline 02 at 3L NC. Repositioning self in bed with minimal assistance. Wound care completed per WOC orders and new mepilex applied to lower back/sacrum. Calmoseptine applied per PRN orders. Leg wraps applied per pt request prior to discharge. Discharge orders per MD. Pt left unit at 1330 via [facility] wheelchair to transport to TCU. All belongings and TCU packet sent with pt.

## 2025-05-19 NOTE — PLAN OF CARE
Problem: Adult Inpatient Plan of Care  Goal: Plan of Care Review  Description: The Plan of Care Review/Shift note should be completed every shift.  The Outcome Evaluation is a brief statement about your assessment that the patient is improving, declining, or no change.  This information will be displayed automatically on your shiftnote.  Outcome: Progressing  Flowsheets (Taken 5/18/2025 3697)  Plan of Care Reviewed With: patient  Overall Patient Progress: improving   Goal Outcome Evaluation:      Plan of Care Reviewed With: patient    Overall Patient Progress: improvingOverall Patient Progress: improving     Patient A/Ox4. VSS on 3L NC, bipap overnight. Patient denies pain. Weight shifted independently, refuses assistance with repositioning. Wounds cleansed with soap and water at HS, medication powder applied see MAR. Call light within reach and patient is able to make her needs known.

## 2025-05-19 NOTE — PLAN OF CARE
Goal Outcome Evaluation:      Plan of Care Reviewed With: patient          Outcome Evaluation: Patient discharging to TCU for short term rehab, goal is to return home.

## 2025-05-19 NOTE — PLAN OF CARE
Physical Therapy Discharge Summary    Reason for therapy discharge:    Discharged to transitional care facility.    Progress towards therapy goal(s). See goals on Care Plan in [facility] electronic health record for goal details.  Goals partially met.  Barriers to achieving goals:   limited tolerance for therapy and discharge from facility.    Therapy recommendation(s):    Continued therapy is recommended.  Rationale/Recommendations:  at TCU.

## 2025-05-19 NOTE — PLAN OF CARE
Occupational Therapy Discharge Summary    Reason for therapy discharge:    Discharged to transitional care facility.    Progress towards therapy goal(s). See goals on Care Plan in [facility] electronic health record for goal details.  Goals partially met.  Barriers to achieving goals:   discharge from facility.    Therapy recommendation(s):    Continued therapy is recommended.  Rationale/Recommendations:  To progress ADL's, strength and assist with discharge planning needs for possible return home.

## 2025-05-19 NOTE — DISCHARGE SUMMARY
"[facility]  Hospitalist Discharge Summary      Date of Admission:  5/8/2025  Date of Discharge:  5/19/2025  2:07 PM  Discharging Provider: Leigh Hardy MD  Discharge Service: Hospitalist Service    Discharge Diagnoses   Severe sepsis with endorgan dysfunction   Lactic acidosis  Acute on chronic respiratory failure  Acute on chronic HFpEF  Critical pulmonary hypertension  Recent DVT, left lower extremity  Chronic lymphedema  Prediabetes  Morbid obesity    Clinically Significant Risk Factors     # Morbid Obesity: Estimated body mass index is 40.65 kg/m  as calculated from the following:    Height as of this encounter: 1.626 m (5' 4\").    Weight as of this encounter: 107.4 kg (236 lb 12.8 oz).       Follow-ups Needed After Discharge   Follow-up Appointments       Follow Up and recommended labs and tests      Follow up with Nursing home physician.  No follow up labs or test are needed.                Unresulted Labs Ordered in the Past 30 Days of this Admission       No orders found from 4/8/2025 to 5/9/2025.        These results will be followed up by     Discharge Disposition   Discharged to short-term care facility  Condition at discharge: Stable    Hospital Course   49-year-old female with pulmonary HTN, chronic respiratory failure and recent DVT presented with shortness of breath and weakness and found to be septic and hypoxic with LLE cellulitis and decompensated heart failure.    Severe sepsis with endorgan dysfunction involving lactic acidosis, improved   Worsened respiratory failure, improved   Pseudomonas bacteremia  Cellulitis, LLE  Recent shingles, L flank  Candida intertrigo  Blood culture grew pan-sensitive Pseudomonas  MRSA screen negative  CT showed no abscess  ID consulted- stopped micafungin and zosyn, switched to PO levaquin  and PO vanc    Acute on chronic HFpEF  Critical pulmonary hypertension  Acute on chronic respiratory failure with hypoxia and hypercapnia  S/p " 10 L oximask, BiPAP, now weaned to nasal cannula.  Sleeping on 3 L of oxygen via nasal cannula which is her baseline.  PTA spironolactone, Jardiance  CTA chest shows no PE   Recent DVT, LLE  Chronic lymphedema  PTA Eliquis  Bumex 6 mg oral daily   Prediabetes  Hypoglycemia, resolved  Stopped SSI and Accu-Cheks   Morbid obesity  BMI 50    Patient is clinically stable enough to be discharged to TCU.  Medication reconciliation has been done.    Consultations This Hospital Stay   PHARMACY TO DOSE VANCO  PHARMACY TO DOSE VANCO  CARDIOLOGY IP CONSULT  LYMPHEDEMA THERAPY IP CONSULT  CARE MANAGEMENT / SOCIAL WORK IP CONSULT  WOUND OSTOMY CONTINENCE NURSE  IP CONSULT  LYMPHEDEMA THERAPY IP CONSULT  WOUND OSTOMY CONTINENCE NURSE  IP CONSULT  INFECTION PREVENTION IP CONSULT  INFECTIOUS DISEASES IP CONSULT  INFECTIOUS DISEASES IP CONSULT  INFECTIOUS DISEASES IP CONSULT  PHYSICAL THERAPY ADULT IP CONSULT  OCCUPATIONAL THERAPY ADULT IP CONSULT  INTENSIVIST IP CONSULT    Code Status   Full Code    Time Spent on this Encounter   I, Leigh Hardy MD, personally saw the patient today and spent greater than 30 minutes discharging this patient.       Leigh Hardy MD  [facility] [place] [id number]  Phone: [phone]  Fax: [phone]  ______________________________________________________________________    Physical Exam   Vital Signs: Temp: 97.8  F (36.6  C) Temp src: Oral BP: 97/63 Pulse: 73   Resp: 16 SpO2: 98 % O2 Device: Nasal cannula Oxygen Delivery: 3 LPM  Weight: 236 lbs 12.8 oz    General Appearance:  No distress noted, chronically sick looking  Respiratory: Diminished air entry bilaterally basally   Cardiovascular: S1-S2 were heard, no murmur or gallop  GI: Soft abdomen, no tenderness, normoactive bowel sounds  HAILEY: Bilateral lower extremity lymphedema          Primary Care Physician   Mikala Luna    Discharge Orders      Primary Care -  Care Coordination Referral      General info for SNF    Length of Stay Estimate: Short Term Care: Estimated # of Days <30  Condition at Discharge: Improving  Level of care:skilled   Rehabilitation Potential: Fair  Admission H&P remains valid and up-to-date: Yes  Recent Chemotherapy: N/A  Use Nursing Home Standing Orders: Yes     Follow Up and recommended labs and tests    Follow up with Nursing home physician.  No follow up labs or test are needed.     Reason for your hospital stay    Severe sepsis     Activity - Up with nursing assistance     BiPAP - Continue Home BiPAP    Continue Home BiPAP per home equipment settings.     Fall precautions     Diet    Follow this diet upon discharge: Current Diet:Orders Placed This Encounter      Fluid restriction 1800 ML FLUID      Low Saturated Fat Na <2400 mg       Significant Results and Procedures       Discharge Medications   Current Discharge Medication List        START taking these medications    Details   lactobacillus rhamnosus, GG, (CULTURELL) capsule Take 1 capsule by mouth 2 times daily for 7 days.    Associated Diagnoses: Cellulitis of abdominal wall      levofloxacin (LEVAQUIN) 750 MG tablet Take 1 tablet (750 mg) by mouth every 24 hours for 1 day.    Associated Diagnoses: Cellulitis of abdominal wall      vancomycin (VANCOCIN) 125 MG capsule Take 1 capsule (125 mg) by mouth daily for 5 days.    Associated Diagnoses: C. difficile colitis           CONTINUE these medications which have CHANGED    Details   spironolactone (ALDACTONE) 25 MG tablet Take 1 tablet (25 mg) by mouth every morning.    Associated Diagnoses: Chronic heart failure with preserved ejection fraction (H)           CONTINUE these medications which have NOT CHANGED    Details   acetaminophen (TYLENOL) 650 MG CR tablet Take 1,300 mg by mouth every 8 hours as needed for mild pain or fever.      apixaban ANTICOAGULANT (ELIQUIS) 5 MG tablet Take 1 tablet (5 mg) by mouth 2 times daily.  Qty: 90 tablet,  Refills: 3    Associated Diagnoses: Acute deep vein thrombosis (DVT) of other specified vein of left lower extremity (H)      aspirin 81 MG EC tablet Take 81 mg by mouth every morning.      bumetanide (BUMEX) 2 MG tablet Take 6 mg by mouth every morning.      Emollient (GOLD BOND MEDICATED BODY EX) Externally apply 1 Application topically 2 times daily as needed      empagliflozin (JARDIANCE) 10 MG TABS tablet Take 10 mg by mouth every morning.      escitalopram (LEXAPRO) 10 MG tablet Take 10 mg by mouth every morning.      miconazole (MICATIN) 2 % external powder Apply topically 2 times daily as needed for itching or other.      potassium chloride sheila ER (KLOR-CON M10) 10 MEQ CR tablet Take 30 mEq by mouth every morning.      sennosides (SENOKOT) 8.6 MG tablet Take 1 tablet by mouth 2 times daily as needed for constipation.  Qty: 20 tablet, Refills: 0      simvastatin (ZOCOR) 10 MG tablet Take 1 tablet (10 mg) by mouth at bedtime.  Qty: 90 tablet, Refills: 3    Associated Diagnoses: Ischemic cerebrovascular accident (CVA) (H)           Allergies   No Known Allergies

## 2025-05-19 NOTE — PROGRESS NOTES
Care Management Discharge Note    Discharge Date: 05/19/2025       Discharge Disposition: Transitional Care    Discharge Services:  Transportation    Discharge DME: None    Discharge Transportation: health plan transportation    Private pay costs discussed: private room/amenity fees    Does the patient's insurance plan have a 3 day qualifying hospital stay waiver?  No    PAS Confirmation Code: [id number]  Patient/family educated on Medicare website which has current facility and service quality ratings: yes    Education Provided on the Discharge Plan:  yes  Persons Notified of Discharge Plans: patient,  Unit, provider, receiving facility  Patient/Family in Agreement with the Plan: yes    Handoff Referral Completed: Yes.    Additional Information:  AL[facility]ED called [facility] to follow up on today's admission. Facility needs BiPAP order with settings. ALFRED sent message to discharging provider asking to provide settings on order for patient's BiPAP.  Will sent orders via [employer] per facility request when ready.  PAS completed. Transportation set for  at 1208 PM. Unit updated.  Faxed physician orders to [facility] via [facility].     KAY Keller     ADDENDUM:  [facility] [place], statement that patient is free of communicable disease and PT/OT orders. ALFRED sent message to provider asking to add above to patient's discharge orders. KAY Keller

## 2025-05-20 ENCOUNTER — PATIENT OUTREACH (OUTPATIENT)
Dept: CARE COORDINATION | Facility: CLINIC | Age: 50
End: 2025-05-20
Payer: COMMERCIAL

## 2025-05-20 ASSESSMENT — ACTIVITIES OF DAILY LIVING (ADL): DEPENDENT_IADLS:: CLEANING;LAUNDRY;TRANSPORTATION

## 2025-05-20 NOTE — LETTER
To:             Please give to facility    From:   Meenakshi Campbell RN, Care Coordinator   [facility]   Meenakshi Campbell RN, Care Coordinator   [facility]   E-mail [email or web address]   [phone]   Patient Name:  Bess Enamorado YOB: 1975   Admit date: 5/19/2025      *Information Needed:  Please contact me when the patient will discharge (or if they will move to long term care)- include the discharge date, disposition, and main diagnosis   If the patient is discharged with home care services, please provide the name of the agency    Also- Please inform me if a care conference is being held.   [facility]   Meenakshi Campbell RN, Care Coordinator   [facility]   E-mail [email or web address]   [phone]                             Thank you         Electronically signed

## 2025-05-21 ENCOUNTER — TRANSITIONAL CARE UNIT VISIT (OUTPATIENT)
Dept: GERIATRICS | Facility: CLINIC | Age: 50
End: 2025-05-21
Payer: COMMERCIAL

## 2025-05-21 VITALS
OXYGEN SATURATION: 94 % | RESPIRATION RATE: 18 BRPM | HEART RATE: 89 BPM | BODY MASS INDEX: 41.26 KG/M2 | DIASTOLIC BLOOD PRESSURE: 68 MMHG | TEMPERATURE: 97.4 F | WEIGHT: 240.4 LBS | SYSTOLIC BLOOD PRESSURE: 100 MMHG

## 2025-05-21 DIAGNOSIS — I89.0 ACQUIRED LYMPHEDEMA OF LOWER EXTREMITY: ICD-10-CM

## 2025-05-21 DIAGNOSIS — L03.116 CELLULITIS OF LEFT LOWER EXTREMITY: ICD-10-CM

## 2025-05-21 DIAGNOSIS — R65.21 SEPSIS DUE TO PSEUDOMONAS SPECIES WITH ACUTE HYPERCAPNIC RESPIRATORY FAILURE AND SEPTIC SHOCK (H): Primary | ICD-10-CM

## 2025-05-21 DIAGNOSIS — I50.32 CHRONIC DIASTOLIC HEART FAILURE (H): ICD-10-CM

## 2025-05-21 DIAGNOSIS — G47.33 OSA (OBSTRUCTIVE SLEEP APNEA): ICD-10-CM

## 2025-05-21 DIAGNOSIS — J96.02 SEPSIS DUE TO PSEUDOMONAS SPECIES WITH ACUTE HYPERCAPNIC RESPIRATORY FAILURE AND SEPTIC SHOCK (H): Primary | ICD-10-CM

## 2025-05-21 DIAGNOSIS — M62.81 GENERALIZED MUSCLE WEAKNESS: ICD-10-CM

## 2025-05-21 DIAGNOSIS — E66.01 MORBID OBESITY (H): ICD-10-CM

## 2025-05-21 DIAGNOSIS — Z86.718 PERSONAL HISTORY OF DVT (DEEP VEIN THROMBOSIS): ICD-10-CM

## 2025-05-21 DIAGNOSIS — I27.20 PULMONARY HYPERTENSION (H): ICD-10-CM

## 2025-05-21 DIAGNOSIS — A41.52 SEPSIS DUE TO PSEUDOMONAS SPECIES WITH ACUTE HYPERCAPNIC RESPIRATORY FAILURE AND SEPTIC SHOCK (H): Primary | ICD-10-CM

## 2025-05-21 DIAGNOSIS — J96.21 ACUTE AND CHRONIC RESPIRATORY FAILURE WITH HYPOXIA (H): ICD-10-CM

## 2025-05-21 DIAGNOSIS — E66.2 OBESITY HYPOVENTILATION SYNDROME (H): ICD-10-CM

## 2025-05-21 PROCEDURE — 99310 SBSQ NF CARE HIGH MDM 45: CPT

## 2025-05-21 RX ORDER — DAPAGLIFLOZIN 10 MG/1
10 TABLET, FILM COATED ORAL DAILY
COMMUNITY

## 2025-05-21 NOTE — PROGRESS NOTES
[facility] GERIATRICS    PRIMARY CARE PROVIDER AND CLINIC:  Mikala Luna MD, [address] [facility] [address]    Chief Complaint   Patient presents with    [facility] Medical Record Number:  [id number]  Place of Service where encounter took place:  [facility] () [[id number]]    Bess Enamorado  is a 49 year old  (1975), admitted to the above facility from  [facility]. Hospital stay 5/8/2025 through 5/19/2025..     HPI:    Brief Hospital Summary:  49-year-old female with pulmonary HTN, chronic respiratory failure and recent DVT presented with shortness of breath and weakness and found to be septic and hypoxic with LLE cellulitis and decompensated heart failure.     Today Bess is doing well. She reports that she would like to go home Saturday. She is back to her baseline supplemental oxygen. Discussed that she has gained 3 lbs since admission, therefore I would like to increase bumex for a couple of days. She is okay with this. Given morbid obesity, difficult to evaluate fluid status in legs. Sleeping well. Bms regular. Denies dysuria/hematuria. Mood stable. Eating well. Sleeping well. Denies shortness of breath, chest pain, palpitations, dizziness, lightheadedness.    CODE STATUS/ADVANCE DIRECTIVES DISCUSSION:  Prior  CPR/Full code   ALLERGIES: No Known Allergies   PAST MEDICAL HISTORY:   Past Medical History:   Diagnosis Date    Acute on chronic diastolic congestive heart failure (H) 02/09/2022    Arthritis 2019    Hips    ASCUS favor benign 08/2015    Neg HPV    Deep vein thrombosis (DVT) of left lower extremity (H) 03/25/2025    Depressive disorder 2010    H/O colposcopy with cervical biopsy 09/14/2015    Bx & ECC - negative    Hypertension 2019    LSIL on Pap smear 07/2014    Neg high risk HPV    Multiple sclerosis (H) 08/29/2005 9/18/200pt dx with MS 2004--dx by neurolgist and is followed by Dr. Harris    Myocardial infarction (H)      Obese     Pneumonia due to infectious organism, unspecified laterality, unspecified part of lung 02/08/2022    Sepsis (Temp 101, , WBC 13.0) 10/23/2018    Shingles 10/2016    SIRS (systemic inflammatory response syndrome) (H) 03/04/2021    Sleep apnea     UNSPEC CONSTIPATION 09/18/2006 9/18/2006   Has tried otc suppository and otc lax.       PAST SURGICAL HISTORY:   has a past surgical history that includes cholecystectomy, laporoscopic; Open reduction internal fixation toe(s) (4/13/2012); colonoscopy (2007?); PICC/Midline Placement (2/21/2022); Combined cystoscopy, retrogrades, exchange stent ureter(s) (Right, 2/21/2022); and PICC/Midline Placement (3/20/2024).  FAMILY HISTORY: family history includes Anxiety Disorder in her maternal grandmother and sister; Cancer in her maternal grandfather, mother, and paternal grandmother; Diabetes in her father; Gynecology in her maternal aunt; Heart Disease in her father; Hypertension in her maternal grandmother; Melanoma in her father; Neurologic Disorder in her father; Other Cancer in her father, maternal grandfather, mother, and paternal grandmother; Sleep Apnea in her father; Thyroid Disease in her maternal grandmother and mother.  SOCIAL HISTORY:   reports that she has never smoked. She has never used smokeless tobacco. She reports current alcohol use. She reports that she does not use drugs.  Patient's living condition: lives alone    Current Outpatient Medications   Medication Sig Dispense Refill    acetaminophen (TYLENOL) 650 MG CR tablet Take 1,300 mg by mouth every 8 hours as needed for mild pain or fever.      apixaban ANTICOAGULANT (ELIQUIS) 5 MG tablet Take 1 tablet (5 mg) by mouth 2 times daily. 90 tablet 3    aspirin 81 MG EC tablet Take 81 mg by mouth every morning.      bumetanide (BUMEX) 2 MG tablet Take 6 mg by mouth every morning.      dapagliflozin (FARXIGA) 10 MG TABS tablet Take 10 mg by mouth daily.      Emollient (GOLD BOND MEDICATED BODY  EX) Externally apply 1 Application topically 2 times daily as needed      escitalopram (LEXAPRO) 10 MG tablet Take 10 mg by mouth every morning.      lactobacillus rhamnosus, GG, (CULTURELL) capsule Take 1 capsule by mouth 2 times daily for 7 days.      miconazole (MICATIN) 2 % external powder Apply topically 2 times daily as needed for itching or other.      potassium chloride sheila ER (KLOR-CON M10) 10 MEQ CR tablet Take 30 mEq by mouth every morning.      sennosides (SENOKOT) 8.6 MG tablet Take 1 tablet by mouth 2 times daily as needed for constipation. 20 tablet 0    simvastatin (ZOCOR) 10 MG tablet Take 1 tablet (10 mg) by mouth at bedtime. 90 tablet 3    spironolactone (ALDACTONE) 25 MG tablet Take 1 tablet (25 mg) by mouth every morning.      vancomycin (VANCOCIN) 125 MG capsule Take 1 capsule (125 mg) by mouth daily for 5 days.       No current facility-administered medications for this visit.      ROS:  Other than stated in HPI all other review of systems is negative.      Vitals:  Vital signs:/68   Pulse 89   Temp 97.4  F (36.3  C)   Resp 18   Wt 109 kg (240 lb 6.4 oz)   LMP 04/08/2025 (Within Days)   SpO2 94%   BMI 41.26 kg/m     GENERAL APPEARANCE: Chronically ill appearing. In no acute distress  LUNGS: Diminished bilaterally, on 3 L nasal cannula  CARD: RRR, S1, S2  ABD: Soft, nondistended and nontender with normal bowel sounds.   MSK: Muscle strength and tone were equal bilaterally. Moves all extremities easily and intentionally.   EXTREMITIES: Bilateral lower extremity lymphedema. No open areas  NEURO: Alert and oriented x 3. Normal affect. Sensation to touch was normal. Face is symmetric.  PSYCH: calm, cooperative    Lab/Diagnostic data:  Recent labs in [facility] reviewed by me today.     ASSESSMENT/PLAN:  Sepsis  Pseudomonas bacteremia  Cellulitis  Generalized weakness  Chronic hypoxia  NARCISA  Obesity hypoventilation syndrome  Morbid obesity  Cellulitis, LLE  Blood culture grew pan-sensitive  Pseudomonas  Treated with PO levaquin and PO vanc    Tolerating well  BMP WDL limits  PT/OT in TCU  Continue nocturnal Bipap   Continue supplemental oxygen 3 L  Follows with Pulmonology Dr. Chaudhry   BMI 50    Acute on chronic HFpEF  Critical pulmonary hypertension  Acute on chronic respiratory failure with hypoxia and hypercapnia  Weights up ~3 lbs today, give additional 3 mg bumex in the afternoon x2 days, fluid restriction  PTA spironolactone, Jardiance    Recent DVT, LLE  Chronic lymphedema  PTA Eliquis  Bumex 6 mg oral daily  OT to treat lymphedema- Resident requires compression garment for management of LE lymphedema. This is medically necessary.     Orders:  Additional bumex 3 mg in the afternoon fro 2 days    >45 minutes spent assessing patient, providing education, reviewing records from recent hospitalization at [facility], collaborating with nursing, developing plan of care.    Electronically signed by:  JULIETA Sparks CNP on 5/21/2025 at 2:46 PM

## 2025-05-21 NOTE — LETTER
5/21/2025      Bess Enamorado  [address]  [facility] [address]        [facility] GERIATRICS    PRIMARY CARE PROVIDER AND CLINIC:  Mikala Luna MD, [address] [facility] [address]    Chief Complaint   Patient presents with     RECHECK      [facility] Medical Record Number:  [id number]  Place of Service where encounter took place:  [facility] ([facility]) [[id number]]    Bess Enamorado  is a 49 year old  (1975), admitted to the above facility from  [facility]. Hospital stay 5/8/2025 through 5/19/2025..     HPI:    Brief Hospital Summary:  49-year-old female with pulmonary HTN, chronic respiratory failure and recent DVT presented with shortness of breath and weakness and found to be septic and hypoxic with LLE cellulitis and decompensated heart failure.     Today Bess is doing well. She reports that she would like to go home Saturday. She is back to her baseline supplemental oxygen. Discussed that she has gained 3 lbs since admission, therefore I would like to increase bumex for a couple of days. She is okay with this. Given morbid obesity, difficult to evaluate fluid status in legs. Sleeping well. Bms regular. Denies dysuria/hematuria. Mood stable. Eating well. Sleeping well. Denies shortness of breath, chest pain, palpitations, dizziness, lightheadedness.    CODE STATUS/ADVANCE DIRECTIVES DISCUSSION:  Prior  CPR/Full code   ALLERGIES: No Known Allergies   PAST MEDICAL HISTORY:   Past Medical History:   Diagnosis Date     Acute on chronic diastolic congestive heart failure (H) 02/09/2022     Arthritis 2019    Hips     ASCUS favor benign 08/2015    Neg HPV     Deep vein thrombosis (DVT) of left lower extremity (H) 03/25/2025     Depressive disorder 2010     H/O colposcopy with cervical biopsy 09/14/2015    Bx & ECC - negative     Hypertension 2019     LSIL on Pap smear 07/2014    Neg high risk HPV     Multiple sclerosis (H) 08/29/2005 9/18/200pt dx with MS  2004--dx by neurolpau and is followed by Dr. Harris     Myocardial infarction (H)      Obese      Pneumonia due to infectious organism, unspecified laterality, unspecified part of lung 02/08/2022     Sepsis (Temp 101, , WBC 13.0) 10/23/2018     Shingles 10/2016     SIRS (systemic inflammatory response syndrome) (H) 03/04/2021     Sleep apnea      UNSPEC CONSTIPATION 09/18/2006 9/18/2006   Has tried otc suppository and otc lax.       PAST SURGICAL HISTORY:   has a past surgical history that includes cholecystectomy, laporoscopic; Open reduction internal fixation toe(s) (4/13/2012); colonoscopy (2007?); PICC/Midline Placement (2/21/2022); Combined cystoscopy, retrogrades, exchange stent ureter(s) (Right, 2/21/2022); and PICC/Midline Placement (3/20/2024).  FAMILY HISTORY: family history includes Anxiety Disorder in her maternal grandmother and sister; Cancer in her maternal grandfather, mother, and paternal grandmother; Diabetes in her father; Gynecology in her maternal aunt; Heart Disease in her father; Hypertension in her maternal grandmother; Melanoma in her father; Neurologic Disorder in her father; Other Cancer in her father, maternal grandfather, mother, and paternal grandmother; Sleep Apnea in her father; Thyroid Disease in her maternal grandmother and mother.  SOCIAL HISTORY:   reports that she has never smoked. She has never used smokeless tobacco. She reports current alcohol use. She reports that she does not use drugs.  Patient's living condition: lives alone    Current Outpatient Medications   Medication Sig Dispense Refill     acetaminophen (TYLENOL) 650 MG CR tablet Take 1,300 mg by mouth every 8 hours as needed for mild pain or fever.       apixaban ANTICOAGULANT (ELIQUIS) 5 MG tablet Take 1 tablet (5 mg) by mouth 2 times daily. 90 tablet 3     aspirin 81 MG EC tablet Take 81 mg by mouth every morning.       bumetanide (BUMEX) 2 MG tablet Take 6 mg by mouth every morning.       dapagliflozin  (FARXIGA) 10 MG TABS tablet Take 10 mg by mouth daily.       Emollient (GOLD BOND MEDICATED BODY EX) Externally apply 1 Application topically 2 times daily as needed       escitalopram (LEXAPRO) 10 MG tablet Take 10 mg by mouth every morning.       lactobacillus rhamnosus, GG, (CULTURELL) capsule Take 1 capsule by mouth 2 times daily for 7 days.       miconazole (MICATIN) 2 % external powder Apply topically 2 times daily as needed for itching or other.       potassium chloride sheila ER (KLOR-CON M10) 10 MEQ CR tablet Take 30 mEq by mouth every morning.       sennosides (SENOKOT) 8.6 MG tablet Take 1 tablet by mouth 2 times daily as needed for constipation. 20 tablet 0     simvastatin (ZOCOR) 10 MG tablet Take 1 tablet (10 mg) by mouth at bedtime. 90 tablet 3     spironolactone (ALDACTONE) 25 MG tablet Take 1 tablet (25 mg) by mouth every morning.       vancomycin (VANCOCIN) 125 MG capsule Take 1 capsule (125 mg) by mouth daily for 5 days.       No current facility-administered medications for this visit.      ROS:  Other than stated in HPI all other review of systems is negative.      Vitals:  Vital signs:/68   Pulse 89   Temp 97.4  F (36.3  C)   Resp 18   Wt 109 kg (240 lb 6.4 oz)   LMP 04/08/2025 (Within Days)   SpO2 94%   BMI 41.26 kg/m     GENERAL APPEARANCE: Chronically ill appearing. In no acute distress  LUNGS: Diminished bilaterally, on 3 L nasal cannula  CARD: RRR, S1, S2  ABD: Soft, nondistended and nontender with normal bowel sounds.   MSK: Muscle strength and tone were equal bilaterally. Moves all extremities easily and intentionally.   EXTREMITIES: Bilateral lower extremity lymphedema. No open areas  NEURO: Alert and oriented x 3. Normal affect. Sensation to touch was normal. Face is symmetric.  PSYCH: calm, cooperative    Lab/Diagnostic data:  Recent labs in [facility] reviewed by me today.     ASSESSMENT/PLAN:  Sepsis  Pseudomonas bacteremia  Cellulitis  Generalized weakness  Chronic  hypoxia  NARCISA  Obesity hypoventilation syndrome  Morbid obesity  Cellulitis, LLE  Blood culture grew pan-sensitive Pseudomonas  Treated with PO levaquin and PO vanc    Tolerating well  BMP WDL limits  PT/OT in TCU  Continue nocturnal Bipap   Continue supplemental oxygen 3 L  Follows with Pulmonology Dr. Chaudhry   BMI 50    Acute on chronic HFpEF  Critical pulmonary hypertension  Acute on chronic respiratory failure with hypoxia and hypercapnia  Weights up ~3 lbs today, give additional 3 mg bumex in the afternoon x2 days, fluid restriction  PTA spironolactone, Jardiance    Recent DVT, LLE  Chronic lymphedema  PTA Eliquis  Bumex 6 mg oral daily  OT to treat lymphadema    Orders:  Additional bumex 3 mg in the afternoon fro 2 days    >45 minutes spent assessing patient, providing education, reviewing records from recent hospitalization at [facility], collaborating with nursing, developing plan of care.    Electronically signed by:  JULIETA Sparks CNP on 5/21/2025 at 2:46 PM                   Sincerely,        JULIETA Sparks CNP    Electronically signed

## 2025-05-28 DIAGNOSIS — Z09 HOSPITAL DISCHARGE FOLLOW-UP: ICD-10-CM

## 2025-05-29 ENCOUNTER — VIRTUAL VISIT (OUTPATIENT)
Dept: FAMILY MEDICINE | Facility: CLINIC | Age: 50
End: 2025-05-29
Payer: COMMERCIAL

## 2025-05-29 DIAGNOSIS — G35 MULTIPLE SCLEROSIS (H): Chronic | ICD-10-CM

## 2025-05-29 DIAGNOSIS — J96.11 CHRONIC RESPIRATORY FAILURE WITH HYPOXIA (H): ICD-10-CM

## 2025-05-29 DIAGNOSIS — I89.0 LYMPHEDEMA: ICD-10-CM

## 2025-05-29 DIAGNOSIS — I50.32 CHRONIC HEART FAILURE WITH PRESERVED EJECTION FRACTION (H): Primary | ICD-10-CM

## 2025-05-29 PROBLEM — L03.311 CELLULITIS OF ABDOMINAL WALL: Status: RESOLVED | Noted: 2024-04-26 | Resolved: 2025-05-29

## 2025-05-29 PROBLEM — A41.9 SEPSIS, DUE TO UNSPECIFIED ORGANISM, UNSPECIFIED WHETHER ACUTE ORGAN DYSFUNCTION PRESENT (H): Status: RESOLVED | Noted: 2024-03-16 | Resolved: 2025-05-29

## 2025-05-29 PROBLEM — T36.95XA ANTIBIOTIC-ASSOCIATED DIARRHEA: Status: RESOLVED | Noted: 2024-04-26 | Resolved: 2025-05-29

## 2025-05-29 PROBLEM — R65.20 SEVERE SEPSIS (H): Status: RESOLVED | Noted: 2025-05-08 | Resolved: 2025-05-29

## 2025-05-29 PROBLEM — A41.9 SEVERE SEPSIS (H): Status: RESOLVED | Noted: 2025-05-08 | Resolved: 2025-05-29

## 2025-05-29 PROBLEM — K52.1 ANTIBIOTIC-ASSOCIATED DIARRHEA: Status: RESOLVED | Noted: 2024-04-26 | Resolved: 2025-05-29

## 2025-05-29 PROBLEM — J96.01 ACUTE RESPIRATORY FAILURE WITH HYPOXIA (H): Status: RESOLVED | Noted: 2024-07-19 | Resolved: 2025-05-29

## 2025-05-29 PROBLEM — R53.1 WEAKNESS: Status: RESOLVED | Noted: 2024-04-26 | Resolved: 2025-05-29

## 2025-05-29 RX ORDER — SPIRONOLACTONE 25 MG/1
12.5 TABLET ORAL DAILY
Status: SHIPPED | DISCHARGE
Start: 2025-05-29

## 2025-05-29 NOTE — PROGRESS NOTES
"Bess is a 49 year old who is being evaluated via a billable video visit.    How would you like to obtain your AVS? MyChart  If the video visit is dropped, the invitation should be resent by: Text to cell phone: [phone]  Will anyone else be joining your video visit? No      Assessment & Plan     Sepsis, resolved  Cellulitis, leg  Has completed course of antibiotics    Chronic heart failure with preserved ejection fraction (H)  Relatively euvolemic  Had some diuretic adjustment temporarily in TCU, will recheck renal function, lytes  - spironolactone (ALDACTONE) 25 MG tablet; Take 0.5 tablets (12.5 mg) by mouth daily.  - Basic metabolic panel  (Ca, Cl, CO2, Creat, Gluc, K, Na, BUN); Future    Multiple sclerosis (H)  Known issue that I take into account for their medical decisions, no current exacerbations or new concerns      Chronic respiratory failure with hypoxia (H)  On home oxygen continuously    Lymphedema  Has OT/lymphedema therapy through home care  Continue garment use        MED REC REQUIRED  Post Medication Reconciliation Status: discharge medications reconciled, continue medications without change  BMI  Estimated body mass index is 42.02 kg/m  as calculated from the following:    Height as of 5/23/25: 1.626 m (5' 4\").    Weight as of 5/23/25: 111 kg (244 lb 12.8 oz).             Subjective   Bess is a 49 year old, presenting for the following health issues:  Video Visit (Sepsis)        5/29/2025     1:08 PM   Additional Questions   Roomed by Vero REAL CMA   Accompanied by Self       Video Start Time: 1:28 PM    [facility]        Hospital Follow-up Visit:    Hospital/Nursing Home/IP Rehab Facility: [facility]  Most Recent Admission Date: 5/8/2025   Most Recent Admission Diagnosis: Severe sepsis (H) - A41.9, R65.20     Most Recent Discharge Date: 5/23/2025  Most Recent Discharge Diagnosis: Severe sepsis (H) - A41.9, R65.20  Intertrigo - L30.4  C. difficile colitis - A04.72  Cellulitis of " "abdominal wall - L03.311  Chronic heart failure with preserved ejection fraction (H) - I50.32   Do you have any other stressors you would like to discuss with your provider? No    Problems taking medications regularly:  None  Medication changes since discharge: Culturell 1 capsule bid x7 days, levaquin 750mg every day, vancocin 125mg every day x5 days, spironolactone 12.5mg qd  Problems adhering to non-medication therapy:  She is unsure if she should be taking 25mg or 12.5mg of spironolactone daily    Summary of hospitalization:  [facility] discharge summary reviewed  Diagnostic Tests/Treatments reviewed.  Follow up needed: none  Other Healthcare Providers Involved in Patient s Care:         Homecare  Update since discharge: She notes that she is feeling well, no signs of infection, fever or pain. She notes swelling is down but is at her normal level. She needs ok for home care to draw blood.       Off all antibiotics now.    Was at [facility].  Discharged on 5/24/2025  Feeling good now.      Questioning Spironolactone dose.  Is on med list at 25 mg, but only prescriptions have been for Spironolactone has been 12.5 mg    Weight has been \"up and down\" but overall pretty stable.   Weight 244 lbs yesterday  No increase in swelling.    No signs of recurrent cellulitis/infection.  Lower legs are \"pretty skinny\" now.  Thighs are \"thinner than they were\". Was doing a lot of sitting with her legs down at rehabilitation because the only way she could have her legs elevated was in bed.    Homecare came to see her.  They \"need a new order\".  [facility].    She says that TCU wanted her kidney function rechecked (this isn't mentioned in the discharge summary).  They did increase her diuretic in TCU for 3 days.       Has all of her prescriptions, doesn't feel she is in need of any refills right now.             Plan of care communicated with patient         5/8/5025 - 5/19/2025 " [facility] Course  49-year-old female with pulmonary HTN, chronic respiratory failure and recent DVT presented with shortness of breath and weakness and found to be septic and hypoxic with LLE cellulitis and decompensated heart failure.    Severe sepsis with endorgan dysfunction involving lactic acidosis, improved   Worsened respiratory failure, improved   Pseudomonas bacteremia  Cellulitis, LLE  Recent shingles, L flank  Candida intertrigo  Blood culture grew pan-sensitive Pseudomonas  MRSA screen negative  CT showed no abscess  ID consulted- stopped micafungin and zosyn, switched to PO levaquin  and PO vanc    Acute on chronic HFpEF  Critical pulmonary hypertension  Acute on chronic respiratory failure with hypoxia and hypercapnia  S/p 10 L oximask, BiPAP, now weaned to nasal cannula.  Sleeping on 3 L of oxygen via nasal cannula which is her baseline.  PTA spironolactone, Jardiance  CTA chest shows no PE   Recent DVT, LLE  Chronic lymphedema  PTA Eliquis  Bumex 6 mg oral daily   Prediabetes  Hypoglycemia, resolved  Stopped SSI and Accu-Cheks   Morbid obesity  BMI 50     Patient is clinically stable enough to be discharged to TCU.  Medication reconciliation has been done.      TCU stay 5/19/2025 - 5/24/2025  NURSING FACILITY COURSE   Today Bess is doing well. We did discuss her weight that has been creeping up since her hospitalization. The additional bumex did not seem to be effective. Bess reports that when she is well, her baseline weight is 245-250. She does not have symptoms of fluid overload today. I offered an alternative diuretic. She expresses that she would rather monitor her weight over the next couple of days and follow up with her primary care provider. Sleeping well. Appetite stable. Mood stable. Having daily bowel movements. Denies urinary symptoms including dysuria or hematuria. Denies shortness of breath, chest pain, palpitations, dizziness, lightheadedness.       ASSESSMENT/PLAN:  Sepsis  Pseudomonas bacteremia  Cellulitis  Generalized weakness  Chronic hypoxia  NARCISA  Obesity hypoventilation syndrome  Morbid obesity  Cellulitis, LLE  Blood culture grew pan-sensitive Pseudomonas  Treated with PO levaquin and PO vanc    BMP WDL limits  PT/OT/HHA ordered for jacques  Continue nocturnal Bipap   Continue supplemental oxygen 3 L (baseline)  Follows with Pulmonology Dr. Chaudhry   BMI 42.02     Acute on chronic HFpEF  Critical pulmonary hypertension  Acute on chronic respiratory failure with hypoxia and hypercapnia  Weights up from 240-244 lbs over the last week today- she reports her baseline weight is 245-250 lbs and would like to continue to monitor. She is not demonstrating any s/s of fluid overload  Continue to monitor daily weights at home and bring to PCP follow up appt (5/29/25)  PTA spironolactone, Jardiance  Follow up with Cardiology (6/16/25)     Recent DVT, LLE  Chronic lymphedema  PTA Eliquis  Bumex 6 mg oral daily  OT to treat lymphedema  Continue to monitor daily weights          Review of Systems  Constitutional, HEENT, cardiovascular, pulmonary, gi and gu systems are negative, except as otherwise noted.      Objective           Vitals:  No vitals were obtained today due to virtual visit.    Physical Exam   GENERAL: alert and no distress  EYES: Eyes grossly normal to inspection.  No discharge or erythema, or obvious scleral/conjunctival abnormalities.  RESP: No audible wheeze, cough, mild visible perioral cyanosis. Wearing home oxygen.   SKIN: Visible skin clear. No significant rash, abnormal pigmentation or lesions.  NEURO: Cranial nerves grossly intact.  Mentation and speech appropriate for age.  PSYCH: Appropriate affect, tone, and pace of words    Labs/imaging reviewed from recent hospital stay.       Video-Visit Details    Type of service:  Video Visit   Video End Time:1:37 PM  Originating Location (pt. Location): Home    Distant Location (provider location):   On-site  Platform used for Video Visit: Moisés  Signed Electronically by: Mikala Luna MD

## 2025-06-02 ENCOUNTER — TELEPHONE (OUTPATIENT)
Dept: FAMILY MEDICINE | Facility: CLINIC | Age: 50
End: 2025-06-02
Payer: COMMERCIAL

## 2025-06-02 NOTE — TELEPHONE ENCOUNTER
Home Care is calling regarding an established patient with [facility].  Requesting orders from: Mikala Luna RN APPROVED: RN able to provide verbal orders.  Home Care will send orders for signature.  RN will close encounter.  Is this a request for a temporary pause in the home care episode?  No    Orders Requested    Physical Therapy  Request for initial certification (first set of orders)   Frequency: 1 visit in 2 weeks  RN gave verbal order: Yes        Phone number Home Care can be reached at: [phone]  Okay to leave a detailed message?: Yes      Ed Ruiz RN

## 2025-06-03 ENCOUNTER — PATIENT OUTREACH (OUTPATIENT)
Dept: CARE COORDINATION | Facility: CLINIC | Age: 50
End: 2025-06-03
Payer: COMMERCIAL

## 2025-06-03 ASSESSMENT — ACTIVITIES OF DAILY LIVING (ADL): DEPENDENT_IADLS:: CLEANING;LAUNDRY;TRANSPORTATION

## 2025-06-03 NOTE — PROGRESS NOTES
Clinic Care Coordination Contact  Clinic Care Coordination Contact  OUTREACH    Referral Information:  Referral Source: IP Handoff    Primary Diagnosis: SIRS/Sepsis    Chief Complaint   Patient presents with    Clinic Care Coordination - Post Hospital     Clinic Care Coordination RN         Universal Utilization:   [facility]     History and Physical - Hospitalist Service       Date of Admission:  5/8/2025     Assessment & Plan  Bess Enamorado is a 49 year old female with a past medical history of Chronic Hypoxic Respiratory Failure (on 3L NC at home), Pulmonary HTN, Chronic CHF who was admitted on 5/8/25 after presenting to [facility] ED for Sepsis with Hypoxia.        #Severe Sepsis with Multiple Possible Sources  #Bilateral Leg Swelling     Assessment: Patient has transitioned to TCU/ARU for short term rehabilitation:     Facility Name: [facility] TCU  discharged 5/24/2025  Clinic Utilization  Difficulty keeping appointments:: No  Compliance Concerns: No  No-Show Concerns: No  No PCP office visit in Past Year: No  Utilization      No Show Count (past year)  0             ED Visits  6             Hospital Admissions  5                    Current as of: 6/2/2025 10:14 PM                Clinical Concerns:  Current Medical Concerns:  Patient reports she is doing well.  Patient has home care RN and PT.  Patient does her own Lymphedema care and has garments .  Cellulitis in leg has improved .  Daily weight is stable   No increased SOB episodes and wears oxygen at 3 liters continuously Pulmonary provider follow up scheduled 6/11/2025 and MTM on that same day  Cardiology and sleep clinic scheduled future appointments    Current Behavioral Concerns: No    Education Provided to patient: CC role introduced    Pain  Pain (GOAL):: No  Health Maintenance Reviewed: Not assessed  Clinical Pathway: None    Medication Management:  Medication review status:   Reviewed by home care RN      Functional  Status:  Dependent ADLs:: Ambulation-walker, Wheelchair-independent  Dependent IADLs:: Cleaning, Laundry, Transportation  Bed or wheelchair confined:: No  Mobility Status: Independent w/Device  Fallen 2 or more times in the past year?: No  Any fall with injury in the past year?: No    Living Situation:  Current living arrangement:: I live in a private home  Type of residence:: Private home - stairs    Lifestyle & Psychosocial Needs:    Social Drivers of Health     Food Insecurity: Low Risk  (5/8/2025)    Food Insecurity     Within the past 12 months, did you worry that your food would run out before you got money to buy more?: No     Within the past 12 months, did the food you bought just not last and you didn t have money to get more?: No   Recent Concern: Food Insecurity - High Risk (3/23/2025)    Food Insecurity     Within the past 12 months, did you worry that your food would run out before you got money to buy more?: Yes     Within the past 12 months, did the food you bought just not last and you didn t have money to get more?: No   Depression: Not at risk (4/1/2025)    PHQ-2     PHQ-2 Score: 2   Housing Stability: High Risk (5/8/2025)    Housing Stability     Do you have housing? : No     Are you worried about losing your housing?: No   Tobacco Use: Low Risk  (5/29/2025)    Patient History     Smoking Tobacco Use: Never     Smokeless Tobacco Use: Never     Passive Exposure: Not on file   Financial Resource Strain: Low Risk  (5/8/2025)    Financial Resource Strain     Within the past 12 months, have you or your family members you live with been unable to get utilities (heat, electricity) when it was really needed?: No   Alcohol Use: Not on file   Transportation Needs: High Risk (5/8/2025)    Transportation Needs     Within the past 12 months, has lack of transportation kept you from medical appointments, getting your medicines, non-medical meetings or appointments, work, or from getting things that you need?:  Yes   Physical Activity: Inactive (3/21/2024)    Exercise Vital Sign     Days of Exercise per Week: 0 days     Minutes of Exercise per Session: 0 min   Interpersonal Safety: Low Risk  (5/8/2025)    Interpersonal Safety     Do you feel physically and emotionally safe where you currently live?: Yes     Within the past 12 months, have you been hit, slapped, kicked or otherwise physically hurt by someone?: No     Within the past 12 months, have you been humiliated or emotionally abused in other ways by your partner or ex-partner?: No   Recent Concern: Interpersonal Safety - High Risk (3/23/2025)    Interpersonal Safety     Do you feel physically and emotionally safe where you currently live?: No     Within the past 12 months, have you been hit, slapped, kicked or otherwise physically hurt by someone?: No     Within the past 12 months, have you been humiliated or emotionally abused in other ways by your partner or ex-partner?: No   Stress: No Stress Concern Present (12/4/2020)    [place] [employer] - Occupational Stress Questionnaire     Feeling of Stress : Only a little   Social Connections: Unknown (3/1/2022)    Social Connection and Isolation Panel [NHANES]     Frequency of Communication with Friends and Family: Twice a week     Frequency of Social Gatherings with Friends and Family: Twice a week     Attends Anabaptist Services: Not on file     Active Member of Clubs or Organizations: Not on file     Attends Club or Organization Meetings: Not on file     Marital Status: Not on file   Health Literacy: Not on file     Diet:: No added salt  Inadequate nutrition (GOAL):: No  Tube Feeding: No  Inadequate activity/exercise (GOAL):: No  Significant changes in sleep pattern (GOAL): No  Transportation means:: Family     Anabaptist or spiritual beliefs that impact treatment:: No  Mental health DX:: Yes  Mental health DX how managed:: Medication  Mental health management concern (GOAL):: No  Chemical  Dependency Status: No Current Concerns  Informal Support system:: Family           Resources and Interventions:  Current Resources:   Skilled Home Care Services: Skilled Nursing, Physical Therapy  Community Resources: Home Care  Supplies Currently Used at Home: Incontinence Supplies, Oxygen Tubing/Supplies, Other  Equipment Currently Used at Home: wheelchair, manual, walker, rolling  Employment Status: disabled         Advance Care Plan/Directive  Advanced Care Plans/Directives on file:: No    Referrals Placed: None      Patient/Caregiver understanding: Expresses good understanding of discharge instructions     Outreach Frequency: 2 weeks, more frequently as needed  Future Appointments                In 1 week Onur Chaudhry MD [facility] Specialty [facility] [place]    In 1 week Cody Cárdenas [facility]    In 1 week Christin Holt APRN CNP [facility] Heart [facility] PSA CLIN    In 4 weeks Yanet Pappas PA-C [facility] Sleep Center [facility] SLEEP    In 4 weeks UC LAB [facility] Lab [facility]    In 4 weeks Cyndie Joyner MD [facility] Hepatology Clinic [facility]            Plan:   Patient will continue home care services   No unmet needs, no further care coordination is needed at this time      [facility]   Meenakshi Campbell RN, Care Coordinator   [facility]   E-mail [email or web address]   [phone]

## 2025-06-11 ENCOUNTER — OFFICE VISIT (OUTPATIENT)
Dept: PULMONOLOGY | Facility: CLINIC | Age: 50
End: 2025-06-11
Payer: COMMERCIAL

## 2025-06-11 ENCOUNTER — VIRTUAL VISIT (OUTPATIENT)
Dept: PHARMACY | Facility: CLINIC | Age: 50
End: 2025-06-11
Attending: FAMILY MEDICINE
Payer: COMMERCIAL

## 2025-06-11 VITALS — OXYGEN SATURATION: 97 % | HEART RATE: 80 BPM | DIASTOLIC BLOOD PRESSURE: 62 MMHG | SYSTOLIC BLOOD PRESSURE: 110 MMHG

## 2025-06-11 DIAGNOSIS — F33.1 MODERATE EPISODE OF RECURRENT MAJOR DEPRESSIVE DISORDER (H): Chronic | ICD-10-CM

## 2025-06-11 DIAGNOSIS — I50.32 CHRONIC HEART FAILURE WITH PRESERVED EJECTION FRACTION (H): ICD-10-CM

## 2025-06-11 DIAGNOSIS — F41.1 GENERALIZED ANXIETY DISORDER: Chronic | ICD-10-CM

## 2025-06-11 DIAGNOSIS — E78.5 HYPERLIPIDEMIA LDL GOAL <70: ICD-10-CM

## 2025-06-11 DIAGNOSIS — I25.2 HISTORY OF NON-ST ELEVATION MYOCARDIAL INFARCTION (NSTEMI): Primary | ICD-10-CM

## 2025-06-11 DIAGNOSIS — Z86.718 HISTORY OF DVT (DEEP VEIN THROMBOSIS): ICD-10-CM

## 2025-06-11 DIAGNOSIS — I27.20 PULMONARY HYPERTENSION (H): ICD-10-CM

## 2025-06-11 DIAGNOSIS — J96.21 ACUTE AND CHRONIC RESPIRATORY FAILURE WITH HYPOXIA (H): Primary | ICD-10-CM

## 2025-06-11 DIAGNOSIS — Z86.711 HISTORY OF PULMONARY EMBOLISM: ICD-10-CM

## 2025-06-11 PROCEDURE — 1111F DSCHRG MED/CURRENT MED MERGE: CPT | Mod: 93 | Performed by: PHARMACIST

## 2025-06-11 PROCEDURE — 3078F DIAST BP <80 MM HG: CPT | Performed by: INTERNAL MEDICINE

## 2025-06-11 PROCEDURE — G2211 COMPLEX E/M VISIT ADD ON: HCPCS | Performed by: INTERNAL MEDICINE

## 2025-06-11 PROCEDURE — 3074F SYST BP LT 130 MM HG: CPT | Performed by: INTERNAL MEDICINE

## 2025-06-11 PROCEDURE — 99605 MTMS BY PHARM NP 15 MIN: CPT | Mod: 93 | Performed by: PHARMACIST

## 2025-06-11 PROCEDURE — 99214 OFFICE O/P EST MOD 30 MIN: CPT | Performed by: INTERNAL MEDICINE

## 2025-06-11 NOTE — PATIENT INSTRUCTIONS
It was good to see you in clinic today. This is what we discussed:    Continue sodium restriction and your diuretics.  Continue CPAP.  Continue the oxygen.  I will see you in one year.  Contact me with questions or concerns.    Onur Chaudhry MD  Pulmonary and Critical Care Medicine  [facility]  Office [phone]

## 2025-06-11 NOTE — PROGRESS NOTES
Pulmonary Clinic Follow-up Visit    Assessment and Plan:   49 year old female never smoker with a history of severe NARCISA with OHS on APAP with oxygen bled in, RLS, severe pulmonary HTN and right heart failure with preserved LVEF, severe obesity, cholecystectomy, depression/anxiety, tiny asymptomatic pulmonary embolism in February 2022 that has since resolved, currently on DOAC, PCOS, recurrent hospitalizations for sepsis (UTIs, cellulitis, bacteremia) and acute decompensated heart failure, presenting for follow-up.     Severe NARCISA with OHS, severe pulmonary HTN, chronic respiratory failure with hypercapnia and hypoxia: Bess has been hospitalized several times, she has had recurrent soft tissue infections, was last hospitalized for 11 nights in May 2025 with severe sepsis with lactic acidosis, respiratory failure, ADHF, had LLE cellulitis. She was in TCU for 4 days, now home. She feels well. Breathing feels stable. Ate meat high in sodium several days ago at a niece's graduation that led to some increased leg edema, but now back to sodium restriction, bumetanide, spironolactone. Has APAP as detailed below and has follow-up with sleep medicine and cardiology. No fevers or cough. Recall that Bess has NARCISA/OHS, on oxygen 3 L/min continuously including bled into her APAP 10-16. She had tiny asymptomatic right-sided pulmonary embolism in early February 2022 in the context of recent COVID-19 infection, not present on CT C/A/P with PE protocol in July 2024; she is on DOAC. PFT has shown restrictive physiology with reduced DLCO c/w pulmonary HTN, NARCISA, obesity. No evidence of airflow obstruction. TTE estimates RVSP 82. Sleep study in August 2017 showed AHI 45.9 with tCO2 increase by 15 mmHg when in supine REM c/w hypoventilation and frequent PLMs (64.2/hr baseline, 89.6/hr on treatment).     Plan:  - continue sodium restriction, bumetanide, spironolactone  - continue oxygen 3 L/min continuously  - continue APAP 10-16 via  oronasal mask when asleep with oxygen 3 L/min bled in  - no indication for inhaled therapies  - recommend remaining up to date with respiratory vaccinations  - follow up in one year  - encouraged her to contact me with questions or concerning symptoms    Onur Chaudhry MD  Pulmonary and Critical Care Medicine  [facility]  Office [phone]  he/him    CCx: Severe NARCISA with OHS, severe pulmonary HTN, chronic respiratory failure with hypercapnia and hypoxia    HPI: 49 year old female never smoker with a history of severe NARCISA with OHS on APAP with oxygen bled in, RLS, severe pulmonary HTN and right heart failure with preserved LVEF, severe obesity, cholecystectomy, depression/anxiety, tiny asymptomatic pulmonary embolism in February 2022 that has since resolved, currently on DOAC, PCOS, recurrent hospitalizations for sepsis (UTIs, cellulitis, bacteremia) and acute decompensated heart failure, presenting for follow-up. Bess has been hospitalized several times, she has had recurrent soft tissue infections, was last hospitalized for 11 nights in May 2025 with severe sepsis with lactic acidosis, respiratory failure, ADHF, had LLE cellulitis. She was in TCU for 4 days, now home. She feels well. Breathing feels stable. Ate meat high in sodium several days ago at a niece's graduation that led to some increased leg edema, but now back to sodium restriction, bumetanide, spironolactone. Has APAP as detailed below and has follow-up with sleep medicine and cardiology. No fevers or cough. Recall that Bess has NARCISA/OHS, on oxygen 3 L/min continuously including bled into her APAP 10-16. She had tiny asymptomatic right-sided pulmonary embolism in early February 2022 in the context of recent COVID-19 infection, not present on CT C/A/P with PE protocol in July 2024; she is on DOAC. PFT has shown restrictive physiology with reduced DLCO c/w pulmonary HTN, NARCISA, obesity. No evidence of airflow obstruction. TTE estimates  RVSP 82. Sleep study in August 2017 showed AHI 45.9 with tCO2 increase by 15 mmHg when in supine REM c/w hypoventilation and frequent PLMs (64.2/hr baseline, 89.6/hr on treatment).    ROS:  A 12-system review was obtained and was negative with the exception of the symptoms endorsed in the history of present illness.    PMH:  never smoker with a history of severe NARCISA with OHS on APAP with oxygen bled in, RLS, severe pulmonary HTN and right heart failure with preserved LVEF, severe obesity, cholecystectomy, depression/anxiety, tiny asymptomatic pulmonary embolism in February 2022 that has since resolved, currently on DOAC, PCOS, recurrent hospitalizations for sepsis (UTIs, cellulitis, bacteremia) and acute decompensated heart failure    PSH:  Past Surgical History:   Procedure Laterality Date    CHOLECYSTECTOMY, LAPOROSCOPIC      Cholecystectomy, Laparoscopic    COLONOSCOPY  2007?    Bathroom issue..results normal    COMBINED CYSTOSCOPY, RETROGRADES, EXCHANGE STENT URETER(S) Right 2/21/2022    Procedure: CYSTOSCOPY, WITH right RETROGRADE PYELOGRAM AND right URETERAL STENT PLACEMENT;  Surgeon: Doyle Palomares MD;  Location: [facility] OR    OPEN REDUCTION INTERNAL FIXATION TOE(S)  4/13/2012    Procedure:OPEN REDUCTION INTERNAL FIXATION TOE(S); Open reduction internal fixation right proximal fifth metatarsal fracture-   Anes-choice block; Surgeon:LEY, JEFFREY DUANE; Location:[facility] OR    PICC SINGLE LUMEN PLACEMENT  3/20/2024    PICC TRIPLE LUMEN PLACEMENT  2/21/2022            Allergies:  No Known Allergies    Family HX:  Family History   Problem Relation Age of Onset    Neurologic Disorder Father         MS    Heart Disease Father         irregular heart rate    Diabetes Father     Melanoma Father     Sleep Apnea Father     Other Cancer Father     Cancer Maternal Grandfather         lung    Other Cancer Maternal Grandfather     Cancer Paternal Grandmother         stomach    Other Cancer Paternal Grandmother     Cancer  Mother     Other Cancer Mother     Thyroid Disease Mother     Gynecology Maternal Aunt         PCOS    Hypertension Maternal Grandmother     Anxiety Disorder Maternal Grandmother     Thyroid Disease Maternal Grandmother     Anxiety Disorder Sister        Social Hx:  Social History     Socioeconomic History    Marital status: Single     Spouse name: Not on file    Number of children: Not on file    Years of education: Not on file    Highest education level: Not on file   Occupational History     Employer: [employer]   Tobacco Use    Smoking status: Never    Smokeless tobacco: Never   Vaping Use    Vaping status: Never Used   Substance and Sexual Activity    Alcohol use: Yes     Comment: social    Drug use: No    Sexual activity: Not Currently     Partners: Male     Birth control/protection: Pill   Other Topics Concern    Parent/sibling w/ CABG, MI or angioplasty before 65F 55M? No   Social History Narrative    , 3rd-5th grade     Social Drivers of Health     Financial Resource Strain: Low Risk  (5/8/2025)    Financial Resource Strain     Within the past 12 months, have you or your family members you live with been unable to get utilities (heat, electricity) when it was really needed?: No   Food Insecurity: Low Risk  (5/8/2025)    Food Insecurity     Within the past 12 months, did you worry that your food would run out before you got money to buy more?: No     Within the past 12 months, did the food you bought just not last and you didn t have money to get more?: No   Recent Concern: Food Insecurity - High Risk (3/23/2025)    Food Insecurity     Within the past 12 months, did you worry that your food would run out before you got money to buy more?: Yes     Within the past 12 months, did the food you bought just not last and you didn t have money to get more?: No   Transportation Needs: High Risk (5/8/2025)    Transportation Needs     Within the past 12 months, has lack of transportation  kept you from medical appointments, getting your medicines, non-medical meetings or appointments, work, or from getting things that you need?: Yes   Physical Activity: Inactive (3/21/2024)    Exercise Vital Sign     Days of Exercise per Week: 0 days     Minutes of Exercise per Session: 0 min   Stress: No Stress Concern Present (12/4/2020)    [place] [employer]ational [employer] - Occupational Stress Questionnaire     Feeling of Stress : Only a little   Social Connections: Unknown (3/1/2022)    Social Connection and Isolation Panel [NHANES]     Frequency of Communication with Friends and Family: Twice a week     Frequency of Social Gatherings with Friends and Family: Twice a week     Attends Oriental orthodox Services: Not on file     Active Member of Clubs or Organizations: Not on file     Attends Club or Organization Meetings: Not on file     Marital Status: Not on file   Interpersonal Safety: Low Risk  (5/8/2025)    Interpersonal Safety     Do you feel physically and emotionally safe where you currently live?: Yes     Within the past 12 months, have you been hit, slapped, kicked or otherwise physically hurt by someone?: No     Within the past 12 months, have you been humiliated or emotionally abused in other ways by your partner or ex-partner?: No   Recent Concern: Interpersonal Safety - High Risk (3/23/2025)    Interpersonal Safety     Do you feel physically and emotionally safe where you currently live?: No     Within the past 12 months, have you been hit, slapped, kicked or otherwise physically hurt by someone?: No     Within the past 12 months, have you been humiliated or emotionally abused in other ways by your partner or ex-partner?: No   Housing Stability: High Risk (5/8/2025)    Housing Stability     Do you have housing? : No     Are you worried about losing your housing?: No       Current Meds:  Current Outpatient Medications   Medication Sig Dispense Refill    acetaminophen (TYLENOL) 650 MG CR tablet Take  1,300 mg by mouth every 8 hours as needed for mild pain or fever.      apixaban ANTICOAGULANT (ELIQUIS) 5 MG tablet Take 1 tablet (5 mg) by mouth 2 times daily. 90 tablet 3    aspirin 81 MG EC tablet Take 81 mg by mouth every morning.      bumetanide (BUMEX) 2 MG tablet Take 6 mg by mouth every morning.      dapagliflozin (FARXIGA) 10 MG TABS tablet Take 10 mg by mouth daily.      Emollient (GOLD BOND MEDICATED BODY EX) Externally apply 1 Application topically 2 times daily as needed      escitalopram (LEXAPRO) 10 MG tablet Take 10 mg by mouth every morning.      miconazole (MICATIN) 2 % external powder Apply topically 2 times daily as needed for itching or other.      potassium chloride sheila ER (KLOR-CON M10) 10 MEQ CR tablet Take 30 mEq by mouth every morning.      sennosides (SENOKOT) 8.6 MG tablet Take 1 tablet by mouth 2 times daily as needed for constipation. 20 tablet 0    simvastatin (ZOCOR) 10 MG tablet Take 1 tablet (10 mg) by mouth at bedtime. 90 tablet 3    spironolactone (ALDACTONE) 25 MG tablet Take 0.5 tablets (12.5 mg) by mouth daily.         Physical Exam:  /62   Pulse 80   LMP 04/08/2025 (Within Days)   SpO2 97%   Gen: alert, oriented, no distress  HEENT: no cervical or supraclavicular lymphadenopathy  CV: RRR, no M/G/R  Resp: CTAB, no focal crackles or wheezes  Skin: no apparent rashes but leg wraps not removed  Ext: chronic bilateral leg edema  Neuro: alert, nonfocal, gait not assessed, in wheelchair    Labs:  reviewed    Imaging studies:  Chest CTA ([facility], [place] 2025):  - images directly reviewed, formal interpretation follows:  FINDINGS:  ANGIOGRAM CHEST: Excellent opacification of pulmonary arteries and branches. No acute pulmonary emboli, webs or pulmonary arterial wall thickening. 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 This measures  approximately 10 x 7 mm in size and can be seen in retrospect on studies going back to 2022.      Main pulmonary artery is again there are enlarged at 4.7 cm. Thoracic aorta and branches are normal.       LUNGS AND PLEURA: Minimal areas of groundglass opacities are noted adjacent to a thickened fissure posteriorly in the inferior right upper lobe. No effusions. No mosaic attenuation to the lung parenchyma.     MEDIASTINUM/AXILLAE: Thyroid is normal. No adenopathy. Oral contrast is seen in the esophagus. No hiatal hernia.     CORONARY ARTERY CALCIFICATION: None.     UPPER ABDOMEN: Contrast seen in the intrahepatic IVC and hepatic veins. Prior cholecystectomy. MUSCULOSKELETAL: Dependent edema along both flanks. No concerning bone lesions.                                                                      IMPRESSION:  1.  Enlarged main pulmonary artery consistent with pulmonary arterial hypertension.  2.  New, minimal groundglass opacities are noted dependently in the posterior right upper lobe with adjacent fissural thickening. Unclear if these are inflammatory in nature or reflect resolving atelectasis or edema.   3.  No effusions or other signs of failure or pneumonia.  4.  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  5.  No changes of acute or chronic pulmonary emboli.    TTE (July 2024)  Hyperdynamic left ventricular function.The visual ejection fraction is >70%.  Mildly decreased right ventricular systolic function.The right ventricle is  moderate to severely dilated.  Moderate bi-atrial enlargement.  There is severe mitral annular calcification.There is mild mitral  stenosis.There is mild to moderate (1-2+) tricuspid regurgitation.  Severe pulmonary hypertension- RVSP 82 mm hg +RA.  IVC diameter >2.1 cm collapsing <50% with sniff suggests a high RA pressure  estimated at 15 mmHg or greater.     No  obvious vegetations noted within the limits of a transthoracic  echocardiogram.  Can consider INNA if high clinical suspicion of endocarditis.     Pulmonary Function Testing  September 2024  Moderate preserved-ratio impaired spirometry  Unable to transfer to plethysmography burris for lung volume measurement  Mild DLCOc reduction    Time spent on chart and image review, meeting with the patient to obtain history, perform physical exam, discuss test results, diagnostic possibilities, further testing options, treatment plan options, and care coordination: 33 minutes    The longitudinal plan of care for the diagnosis(es)/condition(s) as documented were addressed during this visit. Due to the added complexity in care, I will continue to support Bess in the subsequent management and with ongoing continuity of care.

## 2025-06-11 NOTE — LETTER
"Recommended To-Do List      Prepared on: Jun 11, 2025       You can get the best results from your medications by completing the items on this \"To-Do List.\"      Bring your To-Do List when you go to your doctor. And, share it with your family or caregivers.    My To-Do List:  What we talked about: What I should do:    What my medicines are for, how to know if my medicines are working, made sure my medicines are safe for me and reviewed how to take my medicines.      Continue to take your current medications as prescribed.   I've updated your medication list to reflect that you are currently taking Jardiance (empagliflozin) 10 mg daily.     Follow-up: As Needed              "

## 2025-06-11 NOTE — LETTER
June 11, 2025  Bess Enamorado  [address] [facility] [address]    Dear Ms. Enamorado, [facility]     Thank you for talking with me on Jun 11, 2025 about your health and medications. As a follow-up to our conversation, I have included two documents:      Your Recommended To-Do List has steps you should take to get the best results from your medications.  Your Medication List will help you keep track of your medications and how to take them.    If you want to talk about these documents, please call Cody Cárdenas RPH at phone: [phone], Monday-Friday 8-4:30pm.    I look forward to working with you and your doctors to make sure your medications work well for you.    Sincerely,  Cody Cárdenas RPH  [facility] Pharmacist, [facility]

## 2025-06-11 NOTE — LETTER
_  Medication List        Prepared on: Jun 11, 2025     Bring your Medication List when you go to the doctor, hospital, or   emergency room. And, share it with your family or caregivers.     Note any changes to how you take your medications.  Cross out medications when you no longer use them.    Medication How I take it Why I use it Prescriber   acetaminophen (TYLENOL) 650 MG CR tablet Take 1,300 mg by mouth every 8 hours as needed for mild pain or fever. Pain Patient Reported   apixaban ANTICOAGULANT (ELIQUIS) 5 MG tablet Take 1 tablet (5 mg) by mouth 2 times daily. History of Deep Vein Thrombosis Mikala Luna MD   aspirin 81 MG EC tablet Take 81 mg by mouth every morning. Heart Patient Reported   bumetanide (BUMEX) 2 MG tablet Take 6 mg by mouth every morning. Cardiac Failure Mikala Luna MD   empagliflozin (JARDIANCE) 10 MG TABS tablet Take 10 mg by mouth daily. Heart Failure Teja Velazquez DO   escitalopram (LEXAPRO) 10 MG tablet Take 10 mg by mouth every morning. Depression; Anxiety Mikala Luna MD   miconazole (MICATIN) 2 % external powder Apply topically 2 times daily as needed for itching or other. Skin Rash Patient Reported   potassium chloride sheila ER (KLOR-CON M10) 10 MEQ CR tablet Take 30 mEq by mouth every morning. Diuretic Use JULIETA Anguiano CNP   sennosides (SENOKOT) 8.6 MG tablet Take 1 tablet by mouth 2 times daily as needed for constipation. Constipation Sara Wong CNP   simvastatin (ZOCOR) 10 MG tablet Take 1 tablet (10 mg) by mouth at bedtime. Ischemic cerebrovascular accident (CVA)  Mikala Luna MD   spironolactone (ALDACTONE) 25 MG tablet Take 0.5 tablets (12.5 mg) by mouth daily. Chronic heart failure with preserved ejection fraction Mikala Luna MD         Add new medications, over-the-counter drugs, herbals, vitamins, or  minerals in the blank rows below.    Medication How I take it Why I use it Prescriber                                      Allergies:      No  Known Allergies        Side effects I have had:      No Known Side Effects        Other Information:              My notes and questions:

## 2025-06-11 NOTE — PROGRESS NOTES
Medication Therapy Management (MTM) Encounter    ASSESSMENT:                            Medication Adherence/Access: See below for considerations.    CAD/HFpEF/Pulmonary Hypertension/History of DVT/History of Pulmonary Embolism/Hypertension: Stable. Blood pressure is at goal of <130/80 mmHg. Plan in place with pulmonary hypertension clinic/follow-up scheduled with cardiology.     Hyperlipidemia: Stable.     Depression/Anxiety: Stable per patient.     PLAN:                            Continue to take your current medications as prescribed.   I've updated your medication list to reflect that you are currently taking Jardiance (empagliflozin) 10 mg daily.     Follow-up: As Needed    SUBJECTIVE/OBJECTIVE:                          Bess Enamorado is a 49 year old female seen for a transitions of care visit. She was discharged from [facility] on 5/19/25 for severe sepsis/acute on chronic HFpEF.      Reason for visit: Hospital/TCU Follow-Up.    Allergies/ADRs: Reviewed in chart - cannot recall the name for sure but previously had diarrhea issues with multiple sclerosis medication   Past Medical History: Reviewed in chart  Tobacco: She reports that she has never smoked. She has never used smokeless tobacco.  Alcohol: none    Medication Adherence/Access: Patient takes medications directly from bottles.  Patient takes medications 2 time(s) per day.   Per patient, misses medication 0 time(s) per week.   Medication barriers: none.   The patient fills medications at [facility]: NO, fills medications at [facility] in [place], [place].    Hypertension   CAD/HFpEF/Pulmonary Hypertension/History of DVT/History of Pulmonary Embolism/Hypertension  Eliquis 5 mg twice daily  Aspirin 81 mg daily  Bumetanide 6 mg every morning  Jardiance 10 mg daily (had Farxiga list, but never took)   Sprionolactone 12.5 mg daily  Swelling is up an down - going to talk to her heart doctor.  No shortness of breath.  Following sodium  restrictions - can tell the difference.  Does where lymphedema wrappings.  Weighs herself, but not daily due to history of eating disorder.    Oxygen 3 L/min continuously  Patient reports no current medication side effects  Patient does not self-monitor blood pressure.       BP Readings from Last 3 Encounters:   06/11/25 110/62   05/23/25 103/72   05/21/25 100/68     Hyperlipidemia   Simvastatin 10 mg daily  Patient reports no significant myalgias or other side effects.     Recent Labs   Lab Test 02/12/25  0720 09/27/23  1631   CHOL 81 93   HDL 11* 28*   LDL 43 42   TRIG 134 116     Mental Health   Depression/Anxiety  Escitalopram 10 mg daily.   Patient reports no current medication side effects.  Working alright - seeing counselor every 2 weeks.          Today's Vitals: LMP 04/08/2025 (Within Days)     Wt Readings from Last 5 Encounters:   05/23/25 244 lb 12.8 oz (111 kg)   05/21/25 240 lb 6.4 oz (109 kg)   05/19/25 236 lb 12.8 oz (107.4 kg)   04/15/25 257 lb 12.8 oz (116.9 kg)   04/03/25 272 lb 0.8 oz (123.4 kg)     ----------------  Post Discharge Medication Reconciliation Status: discharge medications reconciled and changed, per note/orders.    I spent 22 minutes with this patient today.     A summary of these recommendations was sent via [employer].    Bin Cárdenas, PharmD, BCACP  Medication Therapy Management Pharmacist  Voicemail: [phone]    Telemedicine Visit Details  The patient's medications can be safely assessed via a telemedicine encounter.  Type of service:  Telephone visit  Originating Location (pt. Location): Home    Distant Location (provider location):  On-site  Start Time: 3:01 PM  End Time: 3:23 PM     Medication Therapy Recommendations  No medication therapy recommendations to display

## 2025-06-11 NOTE — LETTER
6/11/2025      Bess Enamorado  [address]  [facility] [address]      Dear Colleague,    Thank you for referring your patient, Bess Enamorado, to the [facility]. Please see a copy of my visit note below.    Pulmonary Clinic Follow-up Visit    Assessment and Plan:   49 year old female never smoker with a history of severe NARCISA with OHS on APAP with oxygen bled in, RLS, severe pulmonary HTN and right heart failure with preserved LVEF, severe obesity, cholecystectomy, depression/anxiety, tiny asymptomatic pulmonary embolism in February 2022 that has since resolved, currently on DOAC, PCOS, recurrent hospitalizations for sepsis (UTIs, cellulitis, bacteremia) and acute decompensated heart failure, presenting for follow-up.     Severe NARCISA with OHS, severe pulmonary HTN, chronic respiratory failure with hypercapnia and hypoxia: Bess has been hospitalized several times, she has had recurrent soft tissue infections, was last hospitalized for 11 nights in May 2025 with severe sepsis with lactic acidosis, respiratory failure, ADHF, had LLE cellulitis. She was in TCU for 4 days, now home. She feels well. Breathing feels stable. Ate meat high in sodium several days ago at a niece's graduation that led to some increased leg edema, but now back to sodium restriction, bumetanide, spironolactone. Has APAP as detailed below and has follow-up with sleep medicine and cardiology. No fevers or cough. Recall that Bess has NARCISA/OHS, on oxygen 3 L/min continuously including bled into her APAP 10-16. She had tiny asymptomatic right-sided pulmonary embolism in early February 2022 in the context of recent COVID-19 infection, not present on CT C/A/P with PE protocol in July 2024; she is on DOAC. PFT has shown restrictive physiology with reduced DLCO c/w pulmonary HTN, NARCISA, obesity. No evidence of airflow obstruction. TTE estimates RVSP 82. Sleep study in August 2017 showed AHI 45.9 with tCO2 increase by 15 mmHg when  in supine REM c/w hypoventilation and frequent PLMs (64.2/hr baseline, 89.6/hr on treatment).     Plan:  - continue sodium restriction, bumetanide, spironolactone  - continue oxygen 3 L/min continuously  - continue APAP 10-16 via oronasal mask when asleep with oxygen 3 L/min bled in  - no indication for inhaled therapies  - recommend remaining up to date with respiratory vaccinations  - follow up in one year  - encouraged her to contact me with questions or concerning symptoms    Onur Chaudhry MD  Pulmonary and Critical Care Medicine  [facility]  Office [phone]  he/him    CCx: Severe NARCISA with OHS, severe pulmonary HTN, chronic respiratory failure with hypercapnia and hypoxia    HPI: 49 year old female never smoker with a history of severe NARCISA with OHS on APAP with oxygen bled in, RLS, severe pulmonary HTN and right heart failure with preserved LVEF, severe obesity, cholecystectomy, depression/anxiety, tiny asymptomatic pulmonary embolism in February 2022 that has since resolved, currently on DOAC, PCOS, recurrent hospitalizations for sepsis (UTIs, cellulitis, bacteremia) and acute decompensated heart failure, presenting for follow-up. Bess has been hospitalized several times, she has had recurrent soft tissue infections, was last hospitalized for 11 nights in May 2025 with severe sepsis with lactic acidosis, respiratory failure, ADHF, had LLE cellulitis. She was in TCU for 4 days, now home. She feels well. Breathing feels stable. Ate meat high in sodium several days ago at a niece's graduation that led to some increased leg edema, but now back to sodium restriction, bumetanide, spironolactone. Has APAP as detailed below and has follow-up with sleep medicine and cardiology. No fevers or cough. Recall that Bess has NARCISA/OHS, on oxygen 3 L/min continuously including bled into her APAP 10-16. She had tiny asymptomatic right-sided pulmonary embolism in early February 2022 in the context of recent  COVID-19 infection, not present on CT C/A/P with PE protocol in July 2024; she is on DOAC. PFT has shown restrictive physiology with reduced DLCO c/w pulmonary HTN, NARCISA, obesity. No evidence of airflow obstruction. TTE estimates RVSP 82. Sleep study in August 2017 showed AHI 45.9 with tCO2 increase by 15 mmHg when in supine REM c/w hypoventilation and frequent PLMs (64.2/hr baseline, 89.6/hr on treatment).    ROS:  A 12-system review was obtained and was negative with the exception of the symptoms endorsed in the history of present illness.    PMH:  never smoker with a history of severe NARCISA with OHS on APAP with oxygen bled in, RLS, severe pulmonary HTN and right heart failure with preserved LVEF, severe obesity, cholecystectomy, depression/anxiety, tiny asymptomatic pulmonary embolism in February 2022 that has since resolved, currently on DOAC, PCOS, recurrent hospitalizations for sepsis (UTIs, cellulitis, bacteremia) and acute decompensated heart failure    PSH:  Past Surgical History:   Procedure Laterality Date     CHOLECYSTECTOMY, LAPOROSCOPIC      Cholecystectomy, Laparoscopic     COLONOSCOPY  2007?    Bathroom issue..results normal     COMBINED CYSTOSCOPY, RETROGRADES, EXCHANGE STENT URETER(S) Right 2/21/2022    Procedure: CYSTOSCOPY, WITH right RETROGRADE PYELOGRAM AND right URETERAL STENT PLACEMENT;  Surgeon: Doyle Palomares MD;  Location: [facility] OR     OPEN REDUCTION INTERNAL FIXATION TOE(S)  4/13/2012    Procedure:OPEN REDUCTION INTERNAL FIXATION TOE(S); Open reduction internal fixation right proximal fifth metatarsal fracture-   Anes-choice block; Surgeon:LEY, JEFFREY DUANE; Location:[facility] OR     PICC SINGLE LUMEN PLACEMENT  3/20/2024     PICC TRIPLE LUMEN PLACEMENT  2/21/2022            Allergies:  No Known Allergies    Family HX:  Family History   Problem Relation Age of Onset     Neurologic Disorder Father         MS     Heart Disease Father         irregular heart rate     Diabetes Father       Melanoma Father      Sleep Apnea Father      Other Cancer Father      Cancer Maternal Grandfather         lung     Other Cancer Maternal Grandfather      Cancer Paternal Grandmother         stomach     Other Cancer Paternal Grandmother      Cancer Mother      Other Cancer Mother      Thyroid Disease Mother      Gynecology Maternal Aunt         PCOS     Hypertension Maternal Grandmother      Anxiety Disorder Maternal Grandmother      Thyroid Disease Maternal Grandmother      Anxiety Disorder Sister        Social Hx:  Social History     Socioeconomic History     Marital status: Single     Spouse name: Not on file     Number of children: Not on file     Years of education: Not on file     Highest education level: Not on file   Occupational History     Employer: [employer]   Tobacco Use     Smoking status: Never     Smokeless tobacco: Never   Vaping Use     Vaping status: Never Used   Substance and Sexual Activity     Alcohol use: Yes     Comment: social     Drug use: No     Sexual activity: Not Currently     Partners: Male     Birth control/protection: Pill   Other Topics Concern     Parent/sibling w/ CABG, MI or angioplasty before 65F 55M? No   Social History Narrative    , 3rd-5th grade     Social Drivers of Health     Financial Resource Strain: Low Risk  (5/8/2025)    Financial Resource Strain      Within the past 12 months, have you or your family members you live with been unable to get utilities (heat, electricity) when it was really needed?: No   Food Insecurity: Low Risk  (5/8/2025)    Food Insecurity      Within the past 12 months, did you worry that your food would run out before you got money to buy more?: No      Within the past 12 months, did the food you bought just not last and you didn t have money to get more?: No   Recent Concern: Food Insecurity - High Risk (3/23/2025)    Food Insecurity      Within the past 12 months, did you worry that your food would run out before  you got money to buy more?: Yes      Within the past 12 months, did the food you bought just not last and you didn t have money to get more?: No   Transportation Needs: High Risk (5/8/2025)    Transportation Needs      Within the past 12 months, has lack of transportation kept you from medical appointments, getting your medicines, non-medical meetings or appointments, work, or from getting things that you need?: Yes   Physical Activity: Inactive (3/21/2024)    Exercise Vital Sign      Days of Exercise per Week: 0 days      Minutes of Exercise per Session: 0 min   Stress: No Stress Concern Present (12/4/2020)    [employer] [place] [employer] - Occupational Stress Questionnaire      Feeling of Stress : Only a little   Social Connections: Unknown (3/1/2022)    Social Connection and Isolation Panel [NHANES]      Frequency of Communication with Friends and Family: Twice a week      Frequency of Social Gatherings with Friends and Family: Twice a week      Attends [employer] Services: Not on file      Active Member of Clubs or Organizations: Not on file      Attends Club or Organization Meetings: Not on file      Marital Status: Not on file   Interpersonal Safety: Low Risk  (5/8/2025)    Interpersonal Safety      Do you feel physically and emotionally safe where you currently live?: Yes      Within the past 12 months, have you been hit, slapped, kicked or otherwise physically hurt by someone?: No      Within the past 12 months, have you been humiliated or emotionally abused in other ways by your partner or ex-partner?: No   Recent Concern: Interpersonal Safety - High Risk (3/23/2025)    Interpersonal Safety      Do you feel physically and emotionally safe where you currently live?: No      Within the past 12 months, have you been hit, slapped, kicked or otherwise physically hurt by someone?: No      Within the past 12 months, have you been humiliated or emotionally abused in other ways by your partner or  ex-partner?: No   Housing Stability: High Risk (5/8/2025)    Housing Stability      Do you have housing? : No      Are you worried about losing your housing?: No       Current Meds:  Current Outpatient Medications   Medication Sig Dispense Refill     acetaminophen (TYLENOL) 650 MG CR tablet Take 1,300 mg by mouth every 8 hours as needed for mild pain or fever.       apixaban ANTICOAGULANT (ELIQUIS) 5 MG tablet Take 1 tablet (5 mg) by mouth 2 times daily. 90 tablet 3     aspirin 81 MG EC tablet Take 81 mg by mouth every morning.       bumetanide (BUMEX) 2 MG tablet Take 6 mg by mouth every morning.       dapagliflozin (FARXIGA) 10 MG TABS tablet Take 10 mg by mouth daily.       Emollient (GOLD BOND MEDICATED BODY EX) Externally apply 1 Application topically 2 times daily as needed       escitalopram (LEXAPRO) 10 MG tablet Take 10 mg by mouth every morning.       miconazole (MICATIN) 2 % external powder Apply topically 2 times daily as needed for itching or other.       potassium chloride sheila ER (KLOR-CON M10) 10 MEQ CR tablet Take 30 mEq by mouth every morning.       sennosides (SENOKOT) 8.6 MG tablet Take 1 tablet by mouth 2 times daily as needed for constipation. 20 tablet 0     simvastatin (ZOCOR) 10 MG tablet Take 1 tablet (10 mg) by mouth at bedtime. 90 tablet 3     spironolactone (ALDACTONE) 25 MG tablet Take 0.5 tablets (12.5 mg) by mouth daily.         Physical Exam:  /62   Pulse 80   LMP 04/08/2025 (Within Days)   SpO2 97%   Gen: alert, oriented, no distress  HEENT: no cervical or supraclavicular lymphadenopathy  CV: RRR, no M/G/R  Resp: CTAB, no focal crackles or wheezes  Skin: no apparent rashes but leg wraps not removed  Ext: chronic bilateral leg edema  Neuro: alert, nonfocal, gait not assessed, in wheelchair    Labs:  reviewed    Imaging studies:  Chest CTA (Mary 2025):  - images directly reviewed, formal interpretation follows:  FINDINGS:  ANGIOGRAM CHEST: Excellent opacification of pulmonary  arteries and branches. No acute pulmonary emboli, webs or pulmonary arterial wall thickening. 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 This measures approximately 10 x 7 mm in size and can be seen in retrospect on studies going back to 2022.      Main pulmonary artery is again there are enlarged at 4.7 cm. Thoracic aorta and branches are normal.       LUNGS AND PLEURA: Minimal areas of groundglass opacities are noted adjacent to a thickened fissure posteriorly in the inferior right upper lobe. No effusions. No mosaic attenuation to the lung parenchyma.     MEDIASTINUM/AXILLAE: Thyroid is normal. No adenopathy. Oral contrast is seen in the esophagus. No hiatal hernia.     CORONARY ARTERY CALCIFICATION: None.     UPPER ABDOMEN: Contrast seen in the intrahepatic IVC and hepatic veins. Prior cholecystectomy. MUSCULOSKELETAL: Dependent edema along both flanks. No concerning bone lesions.                                                                      IMPRESSION:  1.  Enlarged main pulmonary artery consistent with pulmonary arterial hypertension.  2.  New, minimal groundglass opacities are noted dependently in the posterior right upper lobe with adjacent fissural thickening. Unclear if these are inflammatory in nature or reflect resolving atelectasis or edema.   3.  No effusions or other signs of failure or pneumonia.  4.  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  5.  No changes of acute or chronic pulmonary emboli.    TTE (July 2024)  Hyperdynamic left ventricular function.The visual ejection fraction is >70%.  Mildly decreased right ventricular systolic function.The right ventricle is  moderate to severely dilated.  Moderate bi-atrial enlargement.  There  is severe mitral annular calcification.There is mild mitral  stenosis.There is mild to moderate (1-2+) tricuspid regurgitation.  Severe pulmonary hypertension- RVSP 82 mm hg +RA.  IVC diameter >2.1 cm collapsing <50% with sniff suggests a high RA pressure  estimated at 15 mmHg or greater.     No obvious vegetations noted within the limits of a transthoracic  echocardiogram.  Can consider INNA if high clinical suspicion of endocarditis.     Pulmonary Function Testing  September 2024  Moderate preserved-ratio impaired spirometry  Unable to transfer to plethysmography burris for lung volume measurement  Mild DLCOc reduction    Time spent on chart and image review, meeting with the patient to obtain history, perform physical exam, discuss test results, diagnostic possibilities, further testing options, treatment plan options, and care coordination: 33 minutes    The longitudinal plan of care for the diagnosis(es)/condition(s) as documented were addressed during this visit. Due to the added complexity in care, I will continue to support Bess in the subsequent management and with ongoing continuity of care.      Again, thank you for allowing me to participate in the care of your patient.        Sincerely,        Onur Chaudhry MD    Electronically signed

## 2025-06-11 NOTE — PATIENT INSTRUCTIONS
"Recommendations from today's MTM visit:                                                    MTM (medication therapy management) is a service provided by a clinical pharmacist designed to help you get the most of out of your medicines.   Today we reviewed what your medicines are for, how to know if they are working, that your medicines are safe and how to make your medicine regimen as easy as possible.      Continue to take your current medications as prescribed.   I've updated your medication list to reflect that you are currently taking Jardiance (empagliflozin) 10 mg daily.     Follow-up: As Needed    It was great speaking with you today.  I value your experience and would be very thankful for your time in providing feedback in our clinic survey. In the next few days, you may receive an email or text message from [employer] with a link to a survey related to your  clinical pharmacist.\"     To schedule another MTM appointment, please call the clinic directly or you may call the MTM scheduling line at [phone] or toll-free at [phone].     My Clinical Pharmacist's contact information:                                                      Please feel free to contact me with any questions or concerns you have.      Bin Cárdenas, PharmD, BCACP  Medication Therapy Management Pharmacist  Voicemail: [phone]  "

## 2025-06-16 ENCOUNTER — VIRTUAL VISIT (OUTPATIENT)
Dept: CARDIOLOGY | Facility: CLINIC | Age: 50
End: 2025-06-16
Attending: NURSE PRACTITIONER
Payer: COMMERCIAL

## 2025-06-16 ENCOUNTER — TELEPHONE (OUTPATIENT)
Dept: CARDIOLOGY | Facility: CLINIC | Age: 50
End: 2025-06-16

## 2025-06-16 VITALS — HEIGHT: 64 IN | BODY MASS INDEX: 44.56 KG/M2 | WEIGHT: 261 LBS

## 2025-06-16 DIAGNOSIS — E66.2 OBESITY HYPOVENTILATION SYNDROME (H): ICD-10-CM

## 2025-06-16 DIAGNOSIS — G47.33 OSA ON CPAP: ICD-10-CM

## 2025-06-16 DIAGNOSIS — I89.0 LYMPHEDEMA OF BOTH LOWER EXTREMITIES: ICD-10-CM

## 2025-06-16 DIAGNOSIS — E66.01 MORBID OBESITY (H): ICD-10-CM

## 2025-06-16 DIAGNOSIS — I50.812 CHRONIC RIGHT-SIDED HEART FAILURE (H): ICD-10-CM

## 2025-06-16 DIAGNOSIS — I50.32 CHRONIC HEART FAILURE WITH PRESERVED EJECTION FRACTION (H): Primary | ICD-10-CM

## 2025-06-16 DIAGNOSIS — I27.20 PULMONARY HYPERTENSION (H): ICD-10-CM

## 2025-06-16 RX ORDER — SPIRONOLACTONE 25 MG/1
25 TABLET ORAL DAILY
Qty: 90 TABLET | Refills: 3 | Status: SHIPPED | OUTPATIENT
Start: 2025-06-16

## 2025-06-16 NOTE — PATIENT INSTRUCTIONS
Thank you for your visit with the [facility].    Today's Summary:    Increase spironolactone to 25 mg daily.  Increase Bumex to 4 mg twice daily (8 AM and 2 PM).  Lab check by home care nurses at next visit when they come to your home.  Continue to take daily weights, and send me messages with updates as we work on removing some of the fluid you have with diuretics.    Follow-up:  Cardiology follow up at [facility]: VIRTUAL visit 7/8 - early afternoon.    Cardiology Scheduling~[phone]  Cardiology Clinic RN~[phone] (Tomasa RN, Heather RN; Lauren RN)    It was a pleasure seeing you today.     JULIETA Anguiano, CNP  Certified Nurse Practitioner  [facility] [place]  June 16, 2025  ________________________________________________________

## 2025-06-16 NOTE — TELEPHONE ENCOUNTER
[facility] Call Center    Phone Message    May a detailed message be left on voicemail: yes     Reason for Call: Order(s): Other:   Reason for requested: Key- RN at [facility] is requesting for lab order from today's (6/16) visit to be faxed to [facility] @ F: [phone], attn: [email or web address]   Date needed: asap  Provider name: Christin Holt APRN CNP     Action Taken: Other: CARDIO    Travel Screening: Not Applicable     Date of Service:

## 2025-06-16 NOTE — LETTER
6/16/2025    Mikala Luna MD  [id number]  [facility] [address]    RE: Bess Enamorado       Dear Colleague,     I had the pleasure of seeing Bess Enamorado in the [facility].      Cardiology Virtual Visit Progress Note    Service Date: June 16, 2025  Primary Cardiologist: Dr. Diaz  Reason for visit: Hospital follow up  CORE follow up     Ms. Bess Enamorado is a 49 year old female who is being evaluated via a billable VIDEO visit.    Patient has given verbal consent for virtual visit?  Yes    History of Present Illness    Bess Enamorado is a very pleasant 49 year old female with a past medical history notable for hypertension, history of pulmonary embolism, severe pulmonary hypertension, NARCISA on CPAP, morbid obesity with BMI around 56, chronic HFpEF with primarily right-sided heart failure and RV dysfunction, multiple sclerosis, lymphedema, and binge eating disorder.     Over the years, patient has had progressive RV dilation and dysfunction on her echocardiograms. Her most recent echocardiogram in July of this year showed severe pulmonary hypertension with RVSP 82 mm hg +RA, and IVC diameter >2.1 cm collapsing <50% with sniff suggesting a high RA pressure estimated at 15 mmHg or greater. Her LV function is hyperdynamic.  She has mild mitral stenosis.     I saw her in January 2025, and at that time she appeared volume up with increased weight trend.  Bumex dosing was increased from 4 mg daily to 6 mg daily.  In the interim, she was admitted at [facility] on 2/11/2025 following a fall.  Cardiology was consulted due to worsening shortness of breath and volume overload and as she was found to have acute  on chronic HFpEF/right-sided heart failure exacerbation.  276# at discharge.     She struggles to check weight consistently, has dietary indiscretion.  On 3/3 she reported a weight of 256#, then 265# the following week and 269# on 3/17/25 at clinic.  She is on supplemental O2 at 3L most  of the day, CPAP at night.  She reported worsening LE edema with this acute weight gain > gets around with wheelchair, has had weeping edema to her left leg (has wound care services).  With wrapping her legs, she has noticed more abdominal bloating.  Bumex augmented further (up to 8 mg daily).  Discussed infusion clinic as next step if no improvement.     On 3/22/25, she was admitted back to [facility] with cystitis with severe sepsis, plus CHF exacerbation with leg edema.  Admitting weight 282#.  US left leg + for DVT, Eliquis started.  Discharged at 262#.  She went back to ER 4/2/25 for fever, LLE swelling and pain (Hx lymphedema with increased redness and warmth of the extermity).  Dx with cellulitis.  Tx with antibiotics and discharged 4/7.    I saw her in clinic following discharge, and from a cardiac standpoint things were stable.  She had a new left flank rash with some dull left-sided back discomfort.  I referred her for further evaluation, and she was diagnosed with shingles.  We made no changes to her cardiac regimen at that visit.  When I saw her in clinic, she was asymptomatic at a weight of 257#.    Patient was again hospitalized from 5/8 - 5/19/2025 for sepsis due to cellulitis of her lower extremities (presented with leg swelling and fever).      Cardiology was consulted during this admission.  Echocardiogram showed normal LV, reduced RV function with PA systolic 95-1 100+ RAP.  She was hypotensive on arrival, and started on dobutamine plus IV Lasix gtt.  Her weight after discharge 5/23 was 244 pounds.  She saw her PCP after discharge.  At that visit, she was felt to be relatively euvolemic.  Some diuretic adjustments have been made while she has been at [facility].  Patient continues to receive OT/lymphedema therapies through home care services.    Interval 06/16/25    She is recovering from her recent infection - she notes that the infection in her leg led to her sepsis and the symptoms happened very quickly.   She was at TCU for just a couple days after hospitalization.  Historically she is on chronic O2 at 3L NC - sats >95%.  She saw pulmonology just recently.  She feels like she is starting to put on fluid.  Her lower thighs are getting tight - again, breathing is stable.  No abdominal distension.  Her weight is up to 261 pounds today.  She is still voiding well on her current diuretic regimen.  She had a recent grad party that she participated in with family and friends.  There was salty food, which she ate at that party, and again for a few days afterwards.    __________________________________________________________________         Assessment and Impression:     Severe right-sided heart failure with acute on chronic HFpEF  Severe pulmonary hypertension, oxygen dependent on 3 L/min at baseline  Chronic lymphedema  NARCISA on CPAP  Multiple sclerosis  Morbid obesity  History of pulmonary embolism  Skin breakdown  Unilateral left flank clustered rash         Recommendations and Plan:     Increase spironolactone to 25 mg daily.  Increase Bumex to 4 mg twice daily (8 AM and 2 PM).  Daily blood pressure monitoring and daily weights encouraged.  Counseled on diet, including low sodium intake.  BMP in 2 weeks.  ANNAMARIE follow-up in approximately 2 to 3 weeks.  __________________________________________________________________    Thank you for the opportunity to participate in this pleasant patient's care.   We would be happy to see her sooner if needed for any concerns in the meantime.     I spent 37 minutes on the date of encouter of which 20 minutes were spent in medical discussion    JULIETA Anguiano, CNP, CCK  Nurse Practitioner  [facility] - [facility] Care      Provider location: on site  Patient location: Home  Total time on VIDEO call: 20 minutes    Today's clinic visit entailed:  The following tests were independently interpreted by me as noted in my documentation: echo, labs  Ordering of each unique  test  Prescription drug management  The level of medical decision making during this visit was of moderate complexity.  Review of the result(s) of each unique test - cardiac testing, cardiac imaging, labs,  Care everywhere reviewed for additional records to facilitate comprehensive patient care.  Recent hospitalization records and notes reviewed to facilitate comprehensive care coordination.    CURRENT MEDICATIONS:  Current Outpatient Medications   Medication Sig Dispense Refill     acetaminophen (TYLENOL) 650 MG CR tablet Take 1,300 mg by mouth every 8 hours as needed for mild pain or fever.       apixaban ANTICOAGULANT (ELIQUIS) 5 MG tablet Take 1 tablet (5 mg) by mouth 2 times daily. 90 tablet 3     aspirin 81 MG EC tablet Take 81 mg by mouth every morning.       bumetanide (BUMEX) 2 MG tablet Take 4 mg by mouth 2 times daily.       empagliflozin (JARDIANCE) 10 MG TABS tablet Take 10 mg by mouth daily.       escitalopram (LEXAPRO) 10 MG tablet Take 10 mg by mouth every morning.       miconazole (MICATIN) 2 % external powder Apply topically 2 times daily as needed for itching or other.       potassium chloride sheila ER (KLOR-CON M10) 10 MEQ CR tablet Take 30 mEq by mouth every morning.       sennosides (SENOKOT) 8.6 MG tablet Take 1 tablet by mouth 2 times daily as needed for constipation. 20 tablet 0     simvastatin (ZOCOR) 10 MG tablet Take 1 tablet (10 mg) by mouth at bedtime. 90 tablet 3     spironolactone (ALDACTONE) 25 MG tablet Take 1 tablet (25 mg) by mouth daily. 90 tablet 3       ALLERGIES  No Known Allergies    PAST MEDICAL, SURGICAL, FAMILY HISTORY:  History was reviewed and updated as needed, see medical record.    SOCIAL HISTORY:  Social History     Socioeconomic History     Marital status: Single     Spouse name: Not on file     Number of children: Not on file     Years of education: Not on file     Highest education level: Not on file   Occupational History     Employer: [email or web address]  Use     Smoking status: Never     Smokeless tobacco: Never   Vaping Use     Vaping status: Never Used   Substance and Sexual Activity     Alcohol use: Yes     Comment: social     Drug use: No     Sexual activity: Not Currently     Partners: Male     Birth control/protection: Pill   Other Topics Concern     Parent/sibling w/ CABG, MI or angioplasty before 65F 55M? No   Social History Narrative    , 3rd-5th grade     Social Drivers of Health     Financial Resource Strain: Low Risk  (5/8/2025)    Financial Resource Strain      Within the past 12 months, have you or your family members you live with been unable to get utilities (heat, electricity) when it was really needed?: No   Food Insecurity: Low Risk  (5/8/2025)    Food Insecurity      Within the past 12 months, did you worry that your food would run out before you got money to buy more?: No      Within the past 12 months, did the food you bought just not last and you didn t have money to get more?: No   Recent Concern: Food Insecurity - High Risk (3/23/2025)    Food Insecurity      Within the past 12 months, did you worry that your food would run out before you got money to buy more?: Yes      Within the past 12 months, did the food you bought just not last and you didn t have money to get more?: No   Transportation Needs: High Risk (5/8/2025)    Transportation Needs      Within the past 12 months, has lack of transportation kept you from medical appointments, getting your medicines, non-medical meetings or appointments, work, or from getting things that you need?: Yes   Physical Activity: Inactive (3/21/2024)    Exercise Vital Sign      Days of Exercise per Week: 0 days      Minutes of Exercise per Session: 0 min   Stress: No Stress Concern Present (12/4/2020)    [employer] - Occupational Stress Questionnaire      Feeling of Stress : Only a little   Social Connections: Unknown (3/1/2022)    Social Connection  and Isolation Panel [NHANES]      Frequency of Communication with Friends and Family: Twice a week      Frequency of Social Gatherings with Friends and Family: Twice a week      Attends Restorationism Services: Not on file      Active Member of Clubs or Organizations: Not on file      Attends Club or Organization Meetings: Not on file      Marital Status: Not on file   Interpersonal Safety: Low Risk  (5/8/2025)    Interpersonal Safety      Do you feel physically and emotionally safe where you currently live?: Yes      Within the past 12 months, have you been hit, slapped, kicked or otherwise physically hurt by someone?: No      Within the past 12 months, have you been humiliated or emotionally abused in other ways by your partner or ex-partner?: No   Recent Concern: Interpersonal Safety - High Risk (3/23/2025)    Interpersonal Safety      Do you feel physically and emotionally safe where you currently live?: No      Within the past 12 months, have you been hit, slapped, kicked or otherwise physically hurt by someone?: No      Within the past 12 months, have you been humiliated or emotionally abused in other ways by your partner or ex-partner?: No   Housing Stability: High Risk (5/8/2025)    Housing Stability      Do you have housing? : No      Are you worried about losing your housing?: No       Review of Systems:  Skin: negative  Eyes: negative  Ears/Nose/Throat: negative  Respiratory: No shortness of breath, dyspnea on exertion, cough, or hemoptysis  Cardiovascular: LE swelling (acute on chronic)  Gastrointestinal: negative for abdominal distension  Genitourinary: negative  Musculoskeletal: limited mobility, weakness.  Neurologic: negative  Hematologic/Lymphatic/Immunologic: negative  Endocrine: negative  PSYCH: Alert and oriented times 3; coherent speech  RESP: No cough, no audible wheezing, able to talk in full sentences    Remainder of the comprehensive physical exam is unable to be completed due to today's visit  "being completed via telephone call.    Ht 1.626 m (5' 4\")   Wt 118.4 kg (261 lb)   BMI 44.80 kg/m     Wt Readings from Last 4 Encounters:   06/16/25 118.4 kg (261 lb)   05/23/25 111 kg (244 lb 12.8 oz)   05/21/25 109 kg (240 lb 6.4 oz)   05/19/25 107.4 kg (236 lb 12.8 oz)     Recent Lab Results:  LIPID RESULTS:  Lab Results   Component Value Date    CHOL 81 02/12/2025    CHOL 162 02/11/2016    HDL 11 (L) 02/12/2025    HDL 64 02/11/2016    LDL 43 02/12/2025    LDL 75 02/11/2016    TRIG 134 02/12/2025    TRIG 113 02/11/2016    CHOLHDLRATIO 4.0 11/07/2011     LIVER ENZYME RESULTS:  Lab Results   Component Value Date    AST 15 05/08/2025    AST 18 03/07/2021    ALT 8 05/08/2025    ALT 17 03/07/2021     CBC RESULTS:  Lab Results   Component Value Date    WBC 5.4 05/15/2025    WBC 7.3 03/07/2021    RBC 4.63 05/15/2025    RBC 4.91 03/07/2021    HGB 13.5 05/15/2025    HGB 12.5 03/07/2021    HCT 41.2 05/15/2025    HCT 42.8 03/07/2021    MCV 89 05/15/2025    MCV 87 03/07/2021    MCH 29.2 05/15/2025    MCH 25.5 (L) 03/07/2021    MCHC 32.8 05/15/2025    MCHC 29.2 (L) 03/07/2021    RDW 16.3 (H) 05/15/2025    RDW 15.6 (H) 03/07/2021     05/15/2025     03/08/2021     BMP RESULTS:  Lab Results   Component Value Date     05/17/2025     03/07/2021    POTASSIUM 3.6 05/17/2025    POTASSIUM 3.6 09/06/2022    POTASSIUM 4.3 03/07/2021    CHLORIDE 102 05/17/2025    CHLORIDE 98 09/06/2022    CHLORIDE 105 03/07/2021    CO2 33 (H) 05/17/2025    CO2 25 09/06/2022    CO2 30 03/07/2021    ANIONGAP 4 (L) 05/17/2025    ANIONGAP 11 09/06/2022    ANIONGAP 4 03/07/2021    GLC 87 05/17/2025    GLC 88 05/10/2025    GLC 94 09/06/2022    GLC 99 03/07/2021    BUN 18.8 05/17/2025    BUN 22 09/06/2022    BUN 12 03/07/2021    CR 0.82 05/17/2025    CR 0.62 03/07/2021    GFRESTIMATED 87 05/17/2025    GFRESTIMATED >90 03/07/2021    GFRESTBLACK >90 03/07/2021    EVITA 10.2 05/17/2025    EVITA 9.4 03/07/2021      A1C RESULTS:  Lab Results "   Component Value Date    A1C 5.6 02/12/2025    A1C 5.7 (H) 02/13/2020     INR RESULTS:  Lab Results   Component Value Date    INR 1.30 (H) 03/23/2025    INR 1.28 (H) 03/17/2025    INR 0.96 04/19/2013       CC  JULIETA Anguiano CNP  [address]  [place] [address]    This note was completed in part using Dragon voice recognition software. Although reviewed after completion, some word and grammatical errors may occur.     Thank you for allowing me to participate in the care of your patient.      Sincerely,     JULIETA Anguiano CNP     [facility]  cc:   JULIETA Anguiano CNP  [address]  [place] [address]

## 2025-06-16 NOTE — PROGRESS NOTES
Cardiology Virtual Visit Progress Note    Service Date: June 16, 2025  Primary Cardiologist: Dr. Diaz  Reason for visit: Hospital follow up  CORE follow up     Ms. Bess Enamorado is a 49 year old female who is being evaluated via a billable VIDEO visit.    Patient has given verbal consent for virtual visit?  Yes    History of Present Illness    Bess Enamorado is a very pleasant 49 year old female with a past medical history notable for hypertension, history of pulmonary embolism, severe pulmonary hypertension, NARCISA on CPAP, morbid obesity with BMI around 56, chronic HFpEF with primarily right-sided heart failure and RV dysfunction, multiple sclerosis, lymphedema, and binge eating disorder.     Over the years, patient has had progressive RV dilation and dysfunction on her echocardiograms. Her most recent echocardiogram in July of this year showed severe pulmonary hypertension with RVSP 82 mm hg +RA, and IVC diameter >2.1 cm collapsing <50% with sniff suggesting a high RA pressure estimated at 15 mmHg or greater. Her LV function is hyperdynamic.  She has mild mitral stenosis.     I saw her in January 2025, and at that time she appeared volume up with increased weight trend.  Bumex dosing was increased from 4 mg daily to 6 mg daily.  In the interim, she was admitted at [facility] on 2/11/2025 following a fall.  Cardiology was consulted due to worsening shortness of breath and volume overload and as she was found to have acute  on chronic HFpEF/right-sided heart failure exacerbation.  276# at discharge.     She struggles to check weight consistently, has dietary indiscretion.  On 3/3 she reported a weight of 256#, then 265# the following week and 269# on 3/17/25 at clinic.  She is on supplemental O2 at 3L most of the day, CPAP at night.  She reported worsening LE edema with this acute weight gain > gets around with wheelchair, has had weeping edema to her left leg (has wound care services).  With wrapping her  legs, she has noticed more abdominal bloating.  Bumex augmented further (up to 8 mg daily).  Discussed infusion clinic as next step if no improvement.     On 3/22/25, she was admitted back to [facility] with cystitis with severe sepsis, plus CHF exacerbation with leg edema.  Admitting weight 282#.  US left leg + for DVT, Eliquis started.  Discharged at 262#.  She went back to ER 4/2/25 for fever, LLE swelling and pain (Hx lymphedema with increased redness and warmth of the extermity).  Dx with cellulitis.  Tx with antibiotics and discharged 4/7.    I saw her in clinic following discharge, and from a cardiac standpoint things were stable.  She had a new left flank rash with some dull left-sided back discomfort.  I referred her for further evaluation, and she was diagnosed with shingles.  We made no changes to her cardiac regimen at that visit.  When I saw her in clinic, she was asymptomatic at a weight of 257#.    Patient was again hospitalized from 5/8 - 5/19/2025 for sepsis due to cellulitis of her lower extremities (presented with leg swelling and fever).      Cardiology was consulted during this admission.  Echocardiogram showed normal LV, reduced RV function with PA systolic 95-1 100+ RAP.  She was hypotensive on arrival, and started on dobutamine plus IV Lasix gtt.  Her weight after discharge 5/23 was 244 pounds.  She saw her PCP after discharge.  At that visit, she was felt to be relatively euvolemic.  Some diuretic adjustments have been made while she has been at TCU.  Patient continues to receive OT/lymphedema therapies through home care services.    Interval 06/16/25    She is recovering from her recent infection - she notes that the infection in her leg led to her sepsis and the symptoms happened very quickly.  She was at TCU for just a couple days after hospitalization.  Historically she is on chronic O2 at 3L NC - sats >95%.  She saw pulmonology just recently.  She feels like she is starting to put on fluid.   Her lower thighs are getting tight - again, breathing is stable.  No abdominal distension.  Her weight is up to 261 pounds today.  She is still voiding well on her current diuretic regimen.  She had a recent grad party that she participated in with family and friends.  There was salty food, which she ate at that party, and again for a few days afterwards.    __________________________________________________________________         Assessment and Impression:     Severe right-sided heart failure with acute on chronic HFpEF  Severe pulmonary hypertension, oxygen dependent on 3 L/min at baseline  Chronic lymphedema  NARCISA on CPAP  Multiple sclerosis  Morbid obesity  History of pulmonary embolism  Skin breakdown  Unilateral left flank clustered rash         Recommendations and Plan:     Increase spironolactone to 25 mg daily.  Increase Bumex to 4 mg twice daily (8 AM and 2 PM).  Daily blood pressure monitoring and daily weights encouraged.  Counseled on diet, including low sodium intake.  BMP in 2 weeks.  ANNAMARIE follow-up in approximately 2 to 3 weeks.  __________________________________________________________________    Thank you for the opportunity to participate in this pleasant patient's care.   We would be happy to see her sooner if needed for any concerns in the meantime.     I spent 37 minutes on the date of encouter of which 20 minutes were spent in medical discussion    JULIETA Anguiano, CNP, [facility], [place]  Nurse Practitioner  [facility]      Provider location: on site  Patient location: Home  Total time on VIDEO call: 20 minutes    Today's clinic visit entailed:  The following tests were independently interpreted by me as noted in my documentation: echo, labs  Ordering of each unique test  Prescription drug management  The level of medical decision making during this visit was of moderate complexity.  Review of the result(s) of each unique test - cardiac testing, cardiac imaging, labs,  Care  everywhere reviewed for additional records to facilitate comprehensive patient care.  Recent hospitalization records and notes reviewed to facilitate comprehensive care coordination.    CURRENT MEDICATIONS:  Current Outpatient Medications   Medication Sig Dispense Refill    acetaminophen (TYLENOL) 650 MG CR tablet Take 1,300 mg by mouth every 8 hours as needed for mild pain or fever.      apixaban ANTICOAGULANT (ELIQUIS) 5 MG tablet Take 1 tablet (5 mg) by mouth 2 times daily. 90 tablet 3    aspirin 81 MG EC tablet Take 81 mg by mouth every morning.      bumetanide (BUMEX) 2 MG tablet Take 4 mg by mouth 2 times daily.      empagliflozin (JARDIANCE) 10 MG TABS tablet Take 10 mg by mouth daily.      escitalopram (LEXAPRO) 10 MG tablet Take 10 mg by mouth every morning.      miconazole (MICATIN) 2 % external powder Apply topically 2 times daily as needed for itching or other.      potassium chloride sheila ER (KLOR-CON M10) 10 MEQ CR tablet Take 30 mEq by mouth every morning.      sennosides (SENOKOT) 8.6 MG tablet Take 1 tablet by mouth 2 times daily as needed for constipation. 20 tablet 0    simvastatin (ZOCOR) 10 MG tablet Take 1 tablet (10 mg) by mouth at bedtime. 90 tablet 3    spironolactone (ALDACTONE) 25 MG tablet Take 1 tablet (25 mg) by mouth daily. 90 tablet 3       ALLERGIES  No Known Allergies    PAST MEDICAL, SURGICAL, FAMILY HISTORY:  History was reviewed and updated as needed, see medical record.    SOCIAL HISTORY:  Social History     Socioeconomic History    Marital status: Single     Spouse name: Not on file    Number of children: Not on file    Years of education: Not on file    Highest education level: Not on file   Occupational History     Employer: [employer]   Tobacco Use    Smoking status: Never    Smokeless tobacco: Never   Vaping Use    Vaping status: Never Used   Substance and Sexual Activity    Alcohol use: Yes     Comment: social    Drug use: No    Sexual activity: Not Currently      Partners: Male     Birth control/protection: Pill   Other Topics Concern    Parent/sibling w/ CABG, MI or angioplasty before 65F 55M? No   Social History Narrative    , 3rd-5th grade     Social Drivers of Health     Financial Resource Strain: Low Risk  (5/8/2025)    Financial Resource Strain     Within the past 12 months, have you or your family members you live with been unable to get utilities (heat, electricity) when it was really needed?: No   Food Insecurity: Low Risk  (5/8/2025)    Food Insecurity     Within the past 12 months, did you worry that your food would run out before you got money to buy more?: No     Within the past 12 months, did the food you bought just not last and you didn t have money to get more?: No   Recent Concern: Food Insecurity - High Risk (3/23/2025)    Food Insecurity     Within the past 12 months, did you worry that your food would run out before you got money to buy more?: Yes     Within the past 12 months, did the food you bought just not last and you didn t have money to get more?: No   Transportation Needs: High Risk (5/8/2025)    Transportation Needs     Within the past 12 months, has lack of transportation kept you from medical appointments, getting your medicines, non-medical meetings or appointments, work, or from getting things that you need?: Yes   Physical Activity: Inactive (3/21/2024)    Exercise Vital Sign     Days of Exercise per Week: 0 days     Minutes of Exercise per Session: 0 min   Stress: No Stress Concern Present (12/4/2020)    [employer] [place] [employer] - Occupational Stress Questionnaire     Feeling of Stress : Only a little   Social Connections: Unknown (3/1/2022)    Social Connection and Isolation Panel [NHANES]     Frequency of Communication with Friends and Family: Twice a week     Frequency of Social Gatherings with Friends and Family: Twice a week     Attends [employer] Services: Not on file     Active Member of Clubs  "or Organizations: Not on file     Attends Club or Organization Meetings: Not on file     Marital Status: Not on file   Interpersonal Safety: Low Risk  (5/8/2025)    Interpersonal Safety     Do you feel physically and emotionally safe where you currently live?: Yes     Within the past 12 months, have you been hit, slapped, kicked or otherwise physically hurt by someone?: No     Within the past 12 months, have you been humiliated or emotionally abused in other ways by your partner or ex-partner?: No   Recent Concern: Interpersonal Safety - High Risk (3/23/2025)    Interpersonal Safety     Do you feel physically and emotionally safe where you currently live?: No     Within the past 12 months, have you been hit, slapped, kicked or otherwise physically hurt by someone?: No     Within the past 12 months, have you been humiliated or emotionally abused in other ways by your partner or ex-partner?: No   Housing Stability: High Risk (5/8/2025)    Housing Stability     Do you have housing? : No     Are you worried about losing your housing?: No       Review of Systems:  Skin: negative  Eyes: negative  Ears/Nose/Throat: negative  Respiratory: No shortness of breath, dyspnea on exertion, cough, or hemoptysis  Cardiovascular: LE swelling (acute on chronic)  Gastrointestinal: negative for abdominal distension  Genitourinary: negative  Musculoskeletal: limited mobility, weakness.  Neurologic: negative  Hematologic/Lymphatic/Immunologic: negative  Endocrine: negative  PSYCH: Alert and oriented times 3; coherent speech  RESP: No cough, no audible wheezing, able to talk in full sentences    Remainder of the comprehensive physical exam is unable to be completed due to today's visit being completed via telephone call.    Ht 1.626 m (5' 4\")   Wt 118.4 kg (261 lb)   BMI 44.80 kg/m     Wt Readings from Last 4 Encounters:   06/16/25 118.4 kg (261 lb)   05/23/25 111 kg (244 lb 12.8 oz)   05/21/25 109 kg (240 lb 6.4 oz)   05/19/25 107.4 kg " (236 lb 12.8 oz)     Recent Lab Results:  LIPID RESULTS:  Lab Results   Component Value Date    CHOL 81 02/12/2025    CHOL 162 02/11/2016    HDL 11 (L) 02/12/2025    HDL 64 02/11/2016    LDL 43 02/12/2025    LDL 75 02/11/2016    TRIG 134 02/12/2025    TRIG 113 02/11/2016    CHOLHDLRATIO 4.0 11/07/2011     LIVER ENZYME RESULTS:  Lab Results   Component Value Date    AST 15 05/08/2025    AST 18 03/07/2021    ALT 8 05/08/2025    ALT 17 03/07/2021     CBC RESULTS:  Lab Results   Component Value Date    WBC 5.4 05/15/2025    WBC 7.3 03/07/2021    RBC 4.63 05/15/2025    RBC 4.91 03/07/2021    HGB 13.5 05/15/2025    HGB 12.5 03/07/2021    HCT 41.2 05/15/2025    HCT 42.8 03/07/2021    MCV 89 05/15/2025    MCV 87 03/07/2021    MCH 29.2 05/15/2025    MCH 25.5 (L) 03/07/2021    MCHC 32.8 05/15/2025    MCHC 29.2 (L) 03/07/2021    RDW 16.3 (H) 05/15/2025    RDW 15.6 (H) 03/07/2021     05/15/2025     03/08/2021     BMP RESULTS:  Lab Results   Component Value Date     05/17/2025     03/07/2021    POTASSIUM 3.6 05/17/2025    POTASSIUM 3.6 09/06/2022    POTASSIUM 4.3 03/07/2021    CHLORIDE 102 05/17/2025    CHLORIDE 98 09/06/2022    CHLORIDE 105 03/07/2021    CO2 33 (H) 05/17/2025    CO2 25 09/06/2022    CO2 30 03/07/2021    ANIONGAP 4 (L) 05/17/2025    ANIONGAP 11 09/06/2022    ANIONGAP 4 03/07/2021    GLC 87 05/17/2025    GLC 88 05/10/2025    GLC 94 09/06/2022    GLC 99 03/07/2021    BUN 18.8 05/17/2025    BUN 22 09/06/2022    BUN 12 03/07/2021    CR 0.82 05/17/2025    CR 0.62 03/07/2021    GFRESTIMATED 87 05/17/2025    GFRESTIMATED >90 03/07/2021    GFRESTBLACK >90 03/07/2021    EVITA 10.2 05/17/2025    EVITA 9.4 03/07/2021      A1C RESULTS:  Lab Results   Component Value Date    A1C 5.6 02/12/2025    A1C 5.7 (H) 02/13/2020     INR RESULTS:  Lab Results   Component Value Date    INR 1.30 (H) 03/23/2025    INR 1.28 (H) 03/17/2025    INR 0.96 04/19/2013       JULIETA Wells CNP  [address]  [address]  [place] [address]    This note was completed in part using Dragon voice recognition software. Although reviewed after completion, some word and grammatical errors may occur.

## 2025-06-17 ENCOUNTER — TELEPHONE (OUTPATIENT)
Dept: FAMILY MEDICINE | Facility: CLINIC | Age: 50
End: 2025-06-17
Payer: COMMERCIAL

## 2025-06-17 NOTE — TELEPHONE ENCOUNTER
Home Care is calling regarding an established patient with [facility].  Requesting orders from: Mikala Luna  RN APPROVED: RN able to provide verbal orders.  Home Care will send orders for signature.  RN will close encounter.  Is this a request for a temporary pause in the home care episode?  No    Orders Requested    Physical Therapy  Request for discontinuation of care   Goals have been met/progressing.    RN gave verbal order: yes        Phone number Home Care can be reached at: [phone]  Okay to leave a detailed message?: Yes    Contacts       Contact Date/Time Type Contact Phone/Fax    06/17/2025 02:12 PM CDT Phone (Incoming) [facility] r[facility] [phone]          Bam Merritt RN

## 2025-06-18 ENCOUNTER — RESULTS FOLLOW-UP (OUTPATIENT)
Dept: CARDIOLOGY | Facility: CLINIC | Age: 50
End: 2025-06-18

## 2025-06-18 ENCOUNTER — LAB REQUISITION (OUTPATIENT)
Dept: LAB | Facility: CLINIC | Age: 50
End: 2025-06-18
Payer: COMMERCIAL

## 2025-06-18 DIAGNOSIS — I50.9 HEART FAILURE, UNSPECIFIED (H): ICD-10-CM

## 2025-06-18 LAB
ANION GAP SERPL CALCULATED.3IONS-SCNC: 9 MMOL/L (ref 7–15)
BUN SERPL-MCNC: 19.1 MG/DL (ref 6–20)
CALCIUM SERPL-MCNC: 9.8 MG/DL (ref 8.8–10.4)
CHLORIDE SERPL-SCNC: 99 MMOL/L (ref 98–107)
CREAT SERPL-MCNC: 0.79 MG/DL (ref 0.51–0.95)
EGFRCR SERPLBLD CKD-EPI 2021: >90 ML/MIN/1.73M2
GLUCOSE SERPL-MCNC: 88 MG/DL (ref 70–99)
HCO3 SERPL-SCNC: 30 MMOL/L (ref 22–29)
POTASSIUM SERPL-SCNC: 3.6 MMOL/L (ref 3.4–5.3)
SODIUM SERPL-SCNC: 138 MMOL/L (ref 135–145)

## 2025-06-18 PROCEDURE — 80048 BASIC METABOLIC PNL TOTAL CA: CPT | Mod: ORL | Performed by: NURSE PRACTITIONER

## 2025-06-27 ASSESSMENT — SLEEP AND FATIGUE QUESTIONNAIRES
HOW LIKELY ARE YOU TO NOD OFF OR FALL ASLEEP IN A CAR, WHILE STOPPED FOR A FEW MINUTES IN TRAFFIC: WOULD NEVER DOZE
HOW LIKELY ARE YOU TO NOD OFF OR FALL ASLEEP WHILE SITTING AND READING: MODERATE CHANCE OF DOZING
HOW LIKELY ARE YOU TO NOD OFF OR FALL ASLEEP WHILE SITTING INACTIVE IN A PUBLIC PLACE: WOULD NEVER DOZE
HOW LIKELY ARE YOU TO NOD OFF OR FALL ASLEEP WHILE SITTING AND TALKING TO SOMEONE: WOULD NEVER DOZE
HOW LIKELY ARE YOU TO NOD OFF OR FALL ASLEEP WHEN YOU ARE A PASSENGER IN A CAR FOR AN HOUR WITHOUT A BREAK: WOULD NEVER DOZE
HOW LIKELY ARE YOU TO NOD OFF OR FALL ASLEEP WHILE LYING DOWN TO REST IN THE AFTERNOON WHEN CIRCUMSTANCES PERMIT: HIGH CHANCE OF DOZING
HOW LIKELY ARE YOU TO NOD OFF OR FALL ASLEEP WHILE SITTING QUIETLY AFTER LUNCH WITHOUT ALCOHOL: MODERATE CHANCE OF DOZING
HOW LIKELY ARE YOU TO NOD OFF OR FALL ASLEEP WHILE WATCHING TV: MODERATE CHANCE OF DOZING

## 2025-06-30 NOTE — PROGRESS NOTES
Virtual Visit Details    Type of service:  Video Visit   Video Start Time: 11:53AM  Video End Time:12:05PM  Originating Location (pt. Location): Home    Distant Location (provider location):  Off-site  Platform used for Video Visit: [facility]   Outpatient Sleep Medicine  Jul 1, 2025       Name: Bess Enamorado MRN# [id number]   Age: 49 year old YOB: 1975            Assessment and Plan:   1. NARCISA on CPAP (Primary)  Patient's sleep apnea is adequately managed and well treated with current PAP settings 10-16 cmH2O with low residual AHI of 2.5 events per hour. Compliance is excellent.  Continues to tolerate CPAP therapy very well and benefits significantly from use.  She often uses it when she is relaxing and reading as well as for entirety of sleep duration.  Renewed prescription today for new mask and supplies.  She will continue to bleed 3 L of oxygen into her machine at night as well per pulmonology.  Follow-up every 1-2 years for routine CPAP check-in visit or sooner as needed.  - Comprehensive DME       Chief Complaint      Chief Complaint   Patient presents with    RECHECK          History of Present Illness:     Bess Enamorado is a 49 year old female with morbid obesity, PCOS, MS, HTN, CHF, CAD, anxiety, depression, pulmonary HTN, chronic respiratory failure on O2 3L continuous who presents to the clinic for follow-up of their severe NARCISA/OHS treated with CPAP.     Polysomnography - Test date 8/9/2017 (320#):AHI 45.9, basline SpO2 92.9%, mike SpO2 54.9%, time of SpO2 <= 88% of 31.7 minutes.  Severe desaturations and sustained hypoxemia confined to singular supine REM period (no lateral REM observed for comparison).  Also seen to have TCM increase by ~15 mmHg over baseline in supine REM, consistent with hypoventilation.  Pre-study VBG was WNL. CPAP titrated to 10 cm H2O and appeared optimal, including supine REM, with normalization of SpO2 and TCM normalized to low-40's.  " Seen to have frequent PLM's (64.3 / hour on diagnostic, 89.6 / hour on treatment, ~20% associated with cortical arousals).      Last seen 10/2023 and returns to clinic today for routine PAP follow-up visit. In need of prescription for mask/supplies.     Overall, the patient rates their experience with PAP as 8 (0 poor, 10 great). Patient is using a fullface mask.  Mask does not leak but finds it uncomfortable because it can irritate bridge of her nose at times \"but it's tolerable\".  Denies significant oral/nasal dryness.  Pressure setting are comfortable.  No residual snoring or gasping with the mask on.    Unable to estimate average bedtime and wake time.  Estimates she is using CPAP therapy on average 8-10 hours per night and sleeping anywhere between 6-8 hours night.  Does feel well rested on waking.    ResMed Auto-PAP 10-16 cmH2O download (6/1/2025 - 6/30/2025):  30 total days of use. 0 nonuse days. 29 days with >4 hours use.  Average use 13 hours 42 minutes per day. Median Leak 7.6 L/min. 95%ile Leak 62.0 L/min. Median pressure 10.4cm. CPAP 95% pressure 11.6cm. AHI 2.5    SCALES:   INSOMNIA: Insomnia Severity Score: 7   SLEEPINESS: [place] Sleepiness Score: 9    Past medical/surgical history, family history, social history, medications and allergies were reviewed.           Physical Examination:   /66   Pulse 76   Ht 1.626 m (5' 4\")   Wt 118.8 kg (262 lb)   BMI 44.97 kg/m    General appearance: Awake, alert, cooperative. Well groomed. Sitting comfortably in chair. In no apparent distress.  HEENT: Head: Normocephalic, atraumatic. Eyes:Conjunctiva clear. Sclera normal. Nose: External appearance without deformity.   Pulmonary:  Able to speak easily in full sentences. No cough or wheeze.   Skin:  No rashes or significant lesions on visible skin.   Neurologic: Alert, oriented x3.   Psychiatric: Mood euthymic. Affect congruent with full range and intensity.      CC:  Mikala Luna, " JODEE  Jul 1, 2025     [facility]  [address] , [place] [address]     [facility]  [address] [place] [address], [place] [address]    Chart documentation was completed, in part, with BISON voice-recognition software. Even though reviewed, some grammatical, spelling, and word errors may remain.    21 minutes spent on day of encounter doing chart review, history and exam, documentation, and further activities as noted above

## 2025-07-01 ENCOUNTER — VIRTUAL VISIT (OUTPATIENT)
Dept: SLEEP MEDICINE | Facility: CLINIC | Age: 50
End: 2025-07-01
Payer: COMMERCIAL

## 2025-07-01 ENCOUNTER — TELEPHONE (OUTPATIENT)
Dept: FAMILY MEDICINE | Facility: CLINIC | Age: 50
End: 2025-07-01

## 2025-07-01 VITALS
WEIGHT: 262 LBS | DIASTOLIC BLOOD PRESSURE: 66 MMHG | HEART RATE: 76 BPM | BODY MASS INDEX: 44.73 KG/M2 | HEIGHT: 64 IN | SYSTOLIC BLOOD PRESSURE: 128 MMHG

## 2025-07-01 DIAGNOSIS — G47.33 OSA ON CPAP: Primary | ICD-10-CM

## 2025-07-01 PROCEDURE — 1125F AMNT PAIN NOTED PAIN PRSNT: CPT | Mod: 95 | Performed by: PHYSICIAN ASSISTANT

## 2025-07-01 PROCEDURE — 3078F DIAST BP <80 MM HG: CPT | Mod: 95 | Performed by: PHYSICIAN ASSISTANT

## 2025-07-01 PROCEDURE — 3074F SYST BP LT 130 MM HG: CPT | Mod: 95 | Performed by: PHYSICIAN ASSISTANT

## 2025-07-01 PROCEDURE — 98005 SYNCH AUDIO-VIDEO EST LOW 20: CPT | Performed by: PHYSICIAN ASSISTANT

## 2025-07-01 ASSESSMENT — PAIN SCALES - GENERAL: PAINLEVEL_OUTOF10: MILD PAIN (2)

## 2025-07-01 NOTE — NURSING NOTE
Current patient location: [address] [facility] [address]    Is the patient currently in the state of [place]? YES    Visit mode: VIDEO    If the visit is dropped, the patient can be reconnected by:VIDEO VISIT: Text to cell phone:   Telephone Information:   Mobile [phone]       Will anyone else be joining the visit? NO  (If patient encounters technical issues they should call [phone]    Are changes needed to the allergy or medication list? Pt stated no changes to allergies and Pt stated no med changes    Are refills needed on medications prescribed by this physician? NO    Rooming Documentation:  Questionnaire(s) completed    Reason for visit: RECHECK    Liv Lee VVF

## 2025-07-02 ENCOUNTER — LAB (OUTPATIENT)
Dept: LAB | Facility: CLINIC | Age: 50
End: 2025-07-02
Attending: STUDENT IN AN ORGANIZED HEALTH CARE EDUCATION/TRAINING PROGRAM
Payer: COMMERCIAL

## 2025-07-02 ENCOUNTER — MEDICAL CORRESPONDENCE (OUTPATIENT)
Dept: HEALTH INFORMATION MANAGEMENT | Facility: CLINIC | Age: 50
End: 2025-07-02

## 2025-07-02 ENCOUNTER — OFFICE VISIT (OUTPATIENT)
Dept: GASTROENTEROLOGY | Facility: CLINIC | Age: 50
End: 2025-07-02
Attending: STUDENT IN AN ORGANIZED HEALTH CARE EDUCATION/TRAINING PROGRAM
Payer: COMMERCIAL

## 2025-07-02 ENCOUNTER — TELEPHONE (OUTPATIENT)
Dept: CARDIOLOGY | Facility: CLINIC | Age: 50
End: 2025-07-02

## 2025-07-02 VITALS
HEART RATE: 86 BPM | SYSTOLIC BLOOD PRESSURE: 112 MMHG | TEMPERATURE: 98.1 F | OXYGEN SATURATION: 97 % | DIASTOLIC BLOOD PRESSURE: 75 MMHG

## 2025-07-02 DIAGNOSIS — I50.32 CHRONIC HEART FAILURE WITH PRESERVED EJECTION FRACTION (H): ICD-10-CM

## 2025-07-02 DIAGNOSIS — K76.0 HEPATIC STEATOSIS: Primary | ICD-10-CM

## 2025-07-02 DIAGNOSIS — K74.01 HEPATIC FIBROSIS, EARLY FIBROSIS: ICD-10-CM

## 2025-07-02 DIAGNOSIS — K74.60 CIRRHOSIS OF LIVER WITHOUT ASCITES, UNSPECIFIED HEPATIC CIRRHOSIS TYPE (H): ICD-10-CM

## 2025-07-02 DIAGNOSIS — K76.1 HEPATIC CONGESTION: ICD-10-CM

## 2025-07-02 LAB
AFP SERPL-MCNC: <1.8 NG/ML
ALBUMIN SERPL BCG-MCNC: 4 G/DL (ref 3.5–5.2)
ALP SERPL-CCNC: 83 U/L (ref 40–150)
ALT SERPL W P-5'-P-CCNC: <5 U/L (ref 0–50)
ANION GAP SERPL CALCULATED.3IONS-SCNC: 12 MMOL/L (ref 7–15)
AST SERPL W P-5'-P-CCNC: 19 U/L (ref 0–45)
BILIRUB SERPL-MCNC: 2.1 MG/DL
BILIRUBIN DIRECT (ROCHE PRO & PURE): 0.78 MG/DL (ref 0–0.45)
BUN SERPL-MCNC: 24.1 MG/DL (ref 6–20)
CALCIUM SERPL-MCNC: 10 MG/DL (ref 8.8–10.4)
CHLORIDE SERPL-SCNC: 100 MMOL/L (ref 98–107)
CREAT SERPL-MCNC: 0.86 MG/DL (ref 0.51–0.95)
EGFRCR SERPLBLD CKD-EPI 2021: 82 ML/MIN/1.73M2
ERYTHROCYTE [DISTWIDTH] IN BLOOD BY AUTOMATED COUNT: 14.7 % (ref 10–15)
GLUCOSE SERPL-MCNC: 93 MG/DL (ref 70–99)
HCO3 SERPL-SCNC: 25 MMOL/L (ref 22–29)
HCT VFR BLD AUTO: 42.1 % (ref 35–47)
HGB BLD-MCNC: 13.1 G/DL (ref 11.7–15.7)
INR PPP: 1.36 (ref 0.85–1.15)
MCH RBC QN AUTO: 27 PG (ref 26.5–33)
MCHC RBC AUTO-ENTMCNC: 31.1 G/DL (ref 31.5–36.5)
MCV RBC AUTO: 87 FL (ref 78–100)
PLATELET # BLD AUTO: 216 10E3/UL (ref 150–450)
POTASSIUM SERPL-SCNC: 4.1 MMOL/L (ref 3.4–5.3)
PROT SERPL-MCNC: 8.1 G/DL (ref 6.4–8.3)
PROTHROMBIN TIME: 16.9 SECONDS (ref 11.8–14.8)
RBC # BLD AUTO: 4.85 10E6/UL (ref 3.8–5.2)
SODIUM SERPL-SCNC: 137 MMOL/L (ref 135–145)
WBC # BLD AUTO: 4 10E3/UL (ref 4–11)

## 2025-07-02 PROCEDURE — 3078F DIAST BP <80 MM HG: CPT | Performed by: STUDENT IN AN ORGANIZED HEALTH CARE EDUCATION/TRAINING PROGRAM

## 2025-07-02 PROCEDURE — 36415 COLL VENOUS BLD VENIPUNCTURE: CPT | Performed by: PATHOLOGY

## 2025-07-02 PROCEDURE — 80053 COMPREHEN METABOLIC PANEL: CPT | Performed by: PATHOLOGY

## 2025-07-02 PROCEDURE — 82248 BILIRUBIN DIRECT: CPT | Performed by: PATHOLOGY

## 2025-07-02 PROCEDURE — 3074F SYST BP LT 130 MM HG: CPT | Performed by: STUDENT IN AN ORGANIZED HEALTH CARE EDUCATION/TRAINING PROGRAM

## 2025-07-02 PROCEDURE — G0463 HOSPITAL OUTPT CLINIC VISIT: HCPCS | Performed by: STUDENT IN AN ORGANIZED HEALTH CARE EDUCATION/TRAINING PROGRAM

## 2025-07-02 PROCEDURE — G2211 COMPLEX E/M VISIT ADD ON: HCPCS | Performed by: STUDENT IN AN ORGANIZED HEALTH CARE EDUCATION/TRAINING PROGRAM

## 2025-07-02 PROCEDURE — 82105 ALPHA-FETOPROTEIN SERUM: CPT | Performed by: STUDENT IN AN ORGANIZED HEALTH CARE EDUCATION/TRAINING PROGRAM

## 2025-07-02 PROCEDURE — 85027 COMPLETE CBC AUTOMATED: CPT | Performed by: PATHOLOGY

## 2025-07-02 PROCEDURE — 99000 SPECIMEN HANDLING OFFICE-LAB: CPT | Performed by: PATHOLOGY

## 2025-07-02 PROCEDURE — 85610 PROTHROMBIN TIME: CPT | Performed by: PATHOLOGY

## 2025-07-02 PROCEDURE — 99214 OFFICE O/P EST MOD 30 MIN: CPT | Mod: GC | Performed by: STUDENT IN AN ORGANIZED HEALTH CARE EDUCATION/TRAINING PROGRAM

## 2025-07-02 NOTE — PROGRESS NOTES
[facility] New Patient Visit    Date of Visit: July 2, 2025    Reason for referral: Evaluate liver disease    Subjective: Ms. Enamorado is a 48-year-old with a history of obesity, NARCISA, diastolic heart failure with pulmonary hypertension, and MS who presents for evaluation of abnormal liver imaging. Today she is feeling ok overall. She states that she has been following a low salt diet for her heart but she finds this difficult to adhere to. She has been dealing with an eating disorder her whole life. She states that her protein intake is average. She notices some abdominal swelling when she has too much salt but she also has a pelvic tumor that she thinks may be contributing to abdominal distention. She hasn't noticed any brain fog/confusion but notes she had an incident in February where she fell and wasn't found for a day and a half. She was told she likely had a stroke. At this time, family members and people she was close to noticed that she seemed to have brain fog. She does not report feeling shaky. She does not drink alcohol and her last drink was over a year ago. She does not smoke tobacco products or use recreational drugs.      She has had hepatomegaly and steatosis seen on imaging the past. In 2024 she had CT scan that showed a lobular appearing liver. No signs of splenomegaly. No other signs of portal hypertension.    Testing for viral hepatitis is negative.    She does not have diabetes but has prediabetes. She trialed Zepbound but discontinued it due to side effects.    No previous known liver disease, abnormal LFTs, imaging tests. No known history of liver decompensation    ROS: 14 point ROS negative except for positives noted in HPI.    PMHx:  Past Medical History:   Diagnosis Date    Acute on chronic diastolic congestive heart failure (H) 02/09/2022    Arthritis 2019    Hips    ASCUS favor benign 08/2015    Neg HPV    Deep vein thrombosis (DVT) of left lower extremity (H)  03/25/2025    Depressive disorder 2010    H/O colposcopy with cervical biopsy 09/14/2015    Bx & ECC - negative    Hypertension 2019    LSIL on Pap smear 07/2014    Neg high risk HPV    Multiple sclerosis (H) 08/29/2005 9/18/200pt dx with MS 2004--dx by neurolgist and is followed by Dr. Harris    Myocardial infarction (H)     Obese     Pneumonia due to infectious organism, unspecified laterality, unspecified part of lung 02/08/2022    Sepsis (Temp 101, , WBC 13.0) 10/23/2018    Sepsis, due to unspecified organism, unspecified whether acute organ dysfunction present (H) 03/16/2024    Severe sepsis (H) 05/08/2025    Shingles 10/2016    SIRS (systemic inflammatory response syndrome) (H) 03/04/2021    Sleep apnea     UNSPEC CONSTIPATION 09/18/2006 9/18/2006   Has tried otc suppository and otc lax.    PreDM  PCOS    PSHx:  Past Surgical History:   Procedure Laterality Date    CHOLECYSTECTOMY, LAPOROSCOPIC      Cholecystectomy, Laparoscopic    COLONOSCOPY  2007?    Bathroom issue..results normal    COMBINED CYSTOSCOPY, RETROGRADES, EXCHANGE STENT URETER(S) Right 2/21/2022    Procedure: CYSTOSCOPY, WITH right RETROGRADE PYELOGRAM AND right URETERAL STENT PLACEMENT;  Surgeon: Doyle Palomares MD;  Location: [facility] OR    OPEN REDUCTION INTERNAL FIXATION TOE(S)  4/13/2012    Procedure:OPEN REDUCTION INTERNAL FIXATION TOE(S); Open reduction internal fixation right proximal fifth metatarsal fracture-   Anes-choice block; Surgeon:LEY, JEFFREY DUANE; Location:[place] OR    PICC SINGLE LUMEN PLACEMENT  3/20/2024    PICC TRIPLE LUMEN PLACEMENT  2/21/2022            FamHx:  Family History   Problem Relation Age of Onset    Neurologic Disorder Father         MS    Heart Disease Father         irregular heart rate    Diabetes Father     Melanoma Father     Sleep Apnea Father     Other Cancer Father     Cancer Maternal Grandfather         lung    Other Cancer Maternal Grandfather     Cancer Paternal Grandmother          stomach    Other Cancer Paternal Grandmother     Cancer Mother     Other Cancer Mother     Thyroid Disease Mother     Gynecology Maternal Aunt         PCOS    Hypertension Maternal Grandmother     Anxiety Disorder Maternal Grandmother     Thyroid Disease Maternal Grandmother     Anxiety Disorder Sister      No family history of liver disease, liver cancer    SocHx:  Social History     Socioeconomic History    Marital status: Single     Spouse name: Not on file    Number of children: Not on file    Years of education: Not on file    Highest education level: Not on file   Occupational History     Employer: [employer]   Tobacco Use    Smoking status: Never    Smokeless tobacco: Never   Vaping Use    Vaping status: Never Used   Substance and Sexual Activity    Alcohol use: Yes     Comment: social    Drug use: No    Sexual activity: Not Currently     Partners: Male     Birth control/protection: Pill   Other Topics Concern    Parent/sibling w/ CABG, MI or angioplasty before 65F 55M? No   Social History Narrative    , 3rd-5th grade     Social Drivers of Health     Financial Resource Strain: Low Risk  (5/8/2025)    Financial Resource Strain     Within the past 12 months, have you or your family members you live with been unable to get utilities (heat, electricity) when it was really needed?: No   Food Insecurity: Low Risk  (5/8/2025)    Food Insecurity     Within the past 12 months, did you worry that your food would run out before you got money to buy more?: No     Within the past 12 months, did the food you bought just not last and you didn t have money to get more?: No   Recent Concern: Food Insecurity - High Risk (3/23/2025)    Food Insecurity     Within the past 12 months, did you worry that your food would run out before you got money to buy more?: Yes     Within the past 12 months, did the food you bought just not last and you didn t have money to get more?: No   Transportation Needs:  High Risk (5/8/2025)    Transportation Needs     Within the past 12 months, has lack of transportation kept you from medical appointments, getting your medicines, non-medical meetings or appointments, work, or from getting things that you need?: Yes   Physical Activity: Inactive (3/21/2024)    Exercise Vital Sign     Days of Exercise per Week: 0 days     Minutes of Exercise per Session: 0 min   Stress: No Stress Concern Present (12/4/2020)    [employer] [place] [employer] - Occupational Stress Questionnaire     Feeling of Stress : Only a little   Social Connections: Unknown (3/1/2022)    Social Connection and Isolation Panel [NHANES]     Frequency of Communication with Friends and Family: Twice a week     Frequency of Social Gatherings with Friends and Family: Twice a week     Attends Restoration Services: Not on file     Active Member of Clubs or Organizations: Not on file     Attends Club or Organization Meetings: Not on file     Marital Status: Not on file   Interpersonal Safety: Low Risk  (5/8/2025)    Interpersonal Safety     Do you feel physically and emotionally safe where you currently live?: Yes     Within the past 12 months, have you been hit, slapped, kicked or otherwise physically hurt by someone?: No     Within the past 12 months, have you been humiliated or emotionally abused in other ways by your partner or ex-partner?: No   Recent Concern: Interpersonal Safety - High Risk (3/23/2025)    Interpersonal Safety     Do you feel physically and emotionally safe where you currently live?: No     Within the past 12 months, have you been hit, slapped, kicked or otherwise physically hurt by someone?: No     Within the past 12 months, have you been humiliated or emotionally abused in other ways by your partner or ex-partner?: No   Housing Stability: High Risk (5/8/2025)    Housing Stability     Do you have housing? : No     Are you worried about losing your housing?: No   Rare alcohol use - last  November       Medications:  Current Outpatient Medications   Medication Sig Dispense Refill    acetaminophen (TYLENOL) 650 MG CR tablet Take 1,300 mg by mouth every 8 hours as needed for mild pain or fever.      apixaban ANTICOAGULANT (ELIQUIS) 5 MG tablet Take 1 tablet (5 mg) by mouth 2 times daily. 90 tablet 3    aspirin 81 MG EC tablet Take 81 mg by mouth every morning.      bumetanide (BUMEX) 2 MG tablet Take 4 mg by mouth 2 times daily.      empagliflozin (JARDIANCE) 10 MG TABS tablet Take 10 mg by mouth daily.      escitalopram (LEXAPRO) 10 MG tablet Take 10 mg by mouth every morning.      miconazole (MICATIN) 2 % external powder Apply topically 2 times daily as needed for itching or other.      potassium chloride sheila ER (KLOR-CON M10) 10 MEQ CR tablet Take 30 mEq by mouth every morning.      sennosides (SENOKOT) 8.6 MG tablet Take 1 tablet by mouth 2 times daily as needed for constipation. 20 tablet 0    simvastatin (ZOCOR) 10 MG tablet Take 1 tablet (10 mg) by mouth at bedtime. 90 tablet 3    spironolactone (ALDACTONE) 25 MG tablet Take 1 tablet (25 mg) by mouth daily. 90 tablet 3     No current facility-administered medications for this visit.     No OTCs, herbals    Allergies:  No Known Allergies      Objective:  /75   Pulse 86   Temp 98.1  F (36.7  C) (Oral)   SpO2 97%   Constitutional: pleasant woman in NAD  Eyes: non icteric  Respiratory: on 3L per nasal cannula. 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  Abd: Non-distended  Skin: No jaundice  Psychiatric: normal mood and orientation    Labs:  Last Comprehensive Metabolic Panel:  Sodium   Date Value Ref Range Status   07/02/2025 137 135 - 145 mmol/L Final   03/07/2021 139 133 - 144 mmol/L Final     Potassium   Date Value Ref Range Status   07/02/2025 4.1 3.4 - 5.3 mmol/L Final   09/06/2022 3.6 3.5 - 5.0 mmol/L Final   03/07/2021 4.3 3.4 - 5.3 mmol/L Final     Chloride   Date Value Ref Range Status   07/02/2025 100  98 - 107 mmol/L Final   09/06/2022 98 98 - 107 mmol/L Final   03/07/2021 105 94 - 109 mmol/L Final     Carbon Dioxide   Date Value Ref Range Status   03/07/2021 30 20 - 32 mmol/L Final     Carbon Dioxide (CO2)   Date Value Ref Range Status   07/02/2025 25 22 - 29 mmol/L Final   09/06/2022 25 22 - 31 mmol/L Final     Anion Gap   Date Value Ref Range Status   07/02/2025 12 7 - 15 mmol/L Final   09/06/2022 11 5 - 18 mmol/L Final   03/07/2021 4 3 - 14 mmol/L Final     Glucose   Date Value Ref Range Status   07/02/2025 93 70 - 99 mg/dL Final   09/06/2022 94 70 - 125 mg/dL Final   03/07/2021 99 70 - 99 mg/dL Final     GLUCOSE BY METER POCT   Date Value Ref Range Status   05/10/2025 88 70 - 99 mg/dL Final     Urea Nitrogen   Date Value Ref Range Status   07/02/2025 24.1 (H) 6.0 - 20.0 mg/dL Final   09/06/2022 22 8 - 22 mg/dL Final   03/07/2021 12 7 - 30 mg/dL Final     Creatinine   Date Value Ref Range Status   07/02/2025 0.86 0.51 - 0.95 mg/dL Final   03/07/2021 0.62 0.52 - 1.04 mg/dL Final     GFR Estimate   Date Value Ref Range Status   07/02/2025 82 >60 mL/min/1.73m2 Final     Comment:     eGFR calculated using 2021 CKD-EPI equation.   03/07/2021 >90 >60 mL/min/[1.73_m2] Final     Comment:     Non  GFR Calc  Starting 12/18/2018, serum creatinine based estimated GFR (eGFR) will be   calculated using the Chronic Kidney Disease Epidemiology Collaboration   (CKD-EPI) equation.       Calcium   Date Value Ref Range Status   07/02/2025 10.0 8.8 - 10.4 mg/dL Final   03/07/2021 9.4 8.5 - 10.1 mg/dL Final     Bilirubin Total   Date Value Ref Range Status   07/02/2025 2.1 (H) <=1.2 mg/dL Final   03/07/2021 1.1 0.2 - 1.3 mg/dL Final     Alkaline Phosphatase   Date Value Ref Range Status   07/02/2025 83 40 - 150 U/L Final   03/07/2021 57 40 - 150 U/L Final     ALT   Date Value Ref Range Status   07/02/2025 <5 0 - 50 U/L Final   03/07/2021 17 0 - 50 U/L Final     AST   Date Value Ref Range Status   07/02/2025 19 0 -  "45 U/L Final   03/07/2021 18 0 - 45 U/L Final       Lab Results   Component Value Date    WBC 4.1 05/01/2024    WBC 7.3 03/07/2021     Lab Results   Component Value Date    RBC 5.54 05/01/2024    RBC 4.91 03/07/2021     Lab Results   Component Value Date    HGB 14.9 05/01/2024    HGB 12.5 03/07/2021     Lab Results   Component Value Date    HCT 48.9 05/01/2024    HCT 42.8 03/07/2021     Lab Results   Component Value Date    MCV 88 05/01/2024    MCV 87 03/07/2021     Lab Results   Component Value Date    MCH 26.9 05/01/2024    MCH 25.5 03/07/2021     Lab Results   Component Value Date    MCHC 30.5 05/01/2024    MCHC 29.2 03/07/2021     Lab Results   Component Value Date    RDW 16.9 05/01/2024    RDW 15.6 03/07/2021     Lab Results   Component Value Date     05/01/2024     03/08/2021       INR   Date Value Ref Range Status   07/02/2025 1.36 (H) 0.85 - 1.15 Final   04/19/2013 0.96 0.86 - 1.14 Final        Previous work-up:   Lab Results   Component Value Date    HEPBANG Nonreactive 04/18/2024    HBCAB Nonreactive 04/18/2024    AUSAB 23.40 04/18/2024    HCVAB Nonreactive 04/18/2024    TSH 3.52 02/12/2025    CHOL 81 02/12/2025    HDL 11 (L) 02/12/2025    LDL 43 02/12/2025    TRIG 134 02/12/2025    A1C 5.6 02/12/2025      No results found for: \"SPECDES\", \"LDRESULTS\"    Imaging: Reviewed in EHR     Endoscopy: no upper endoscopy    Independently reviewed labs and imaging.     Assessment/Plan:    Ms. Enamorado is a 49-year-old with a history of obesity, NARCISA, diastolic heart failure with pulmonary hypertension, and MS who presents for evaluation of abnormal liver imaging.    She has risk factors for metabolic dysfunction associated steatotic liver disease, also at risk for liver dysfunction related to chronic diastolic heart failure pulmonary hypertension.     Discussed that patients with congestive hepatopathy can have a lobular appearing liver on imaging without clear cirrhosis on biopsy. It does not appear that " she has cirrhosis at this time. Discussed if patients have cirrhosis they should undergo liver cancer screening. She will get a RUQ US with doppler next month and then again before her RTC in 12 months. There is no need for a liver biopsy at this time as it will not change course of treatment.    Discussed I would continue to abstain from alcohol as she is doing and continue a low salt and high protein diet. Agree with working with her cardiology team and optimizing her volume overload, discussed elevated pulmonary pressures leading to hepatic congestion could accelerate liver damage.    -Right upper quadrant ultrasound next month, then again before RTC in 12 months.    Orders Placed This Encounter   Procedures    US Abdomen Limited w Abdomen Doppler Limited     RTC 12 months.    Gertrudis Pacheco, DO  Internal Medicine PGY-1

## 2025-07-02 NOTE — LETTER
7/2/2025      Bess Enamorado  [address] [facility] [address]      Dear Colleague,    Thank you for referring your patient, Bess Enamorado, to the [facility] [place]. Please see a copy of my visit note below.    [facility] New Patient Visit    Date of Visit: July 2, 2025    Reason for referral: Evaluate liver disease    Subjective: Ms. Enamorado is a 48-year-old with a history of obesity, NARCISA, diastolic heart failure with pulmonary hypertension, and MS who presents for evaluation of abnormal liver imaging. Today she is feeling ok overall. She states that she has been following a low salt diet for her heart but she finds this difficult to adhere to. She has been dealing with an eating disorder her whole life. She states that her protein intake is average. She notices some abdominal swelling when she has too much salt but she also has a pelvic tumor that she thinks may be contributing to abdominal distention. She hasn't noticed any brain fog/confusion but notes she had an incident in February where she fell and wasn't found for a day and a half. She was told she likely had a stroke. At this time, family members and people she was close to noticed that she seemed to have brain fog. She does not report feeling shaky. She does not drink alcohol and her last drink was over a year ago. She does not smoke tobacco products or use recreational drugs.      She has had hepatomegaly and steatosis seen on imaging the past. In 2024 she had CT scan that showed a lobular appearing liver. No signs of splenomegaly. No other signs of portal hypertension.    Testing for viral hepatitis is negative.    She does not have diabetes but has prediabetes. She trialed Zepbound but discontinued it due to side effects.    No previous known liver disease, abnormal LFTs, imaging tests. No known history of liver decompensation    ROS: 14 point ROS negative except for positives noted in  HPI.    PMHx:  Past Medical History:   Diagnosis Date     Acute on chronic diastolic congestive heart failure (H) 02/09/2022     Arthritis 2019    Hips     ASCUS favor benign 08/2015    Neg HPV     Deep vein thrombosis (DVT) of left lower extremity (H) 03/25/2025     Depressive disorder 2010     H/O colposcopy with cervical biopsy 09/14/2015    Bx & ECC - negative     Hypertension 2019     LSIL on Pap smear 07/2014    Neg high risk HPV     Multiple sclerosis (H) 08/29/2005 9/18/200pt dx with MS 2004--dx by neurolgist and is followed by Dr. Harris     Myocardial infarction (H)      Obese      Pneumonia due to infectious organism, unspecified laterality, unspecified part of lung 02/08/2022     Sepsis (Temp 101, , WBC 13.0) 10/23/2018     Sepsis, due to unspecified organism, unspecified whether acute organ dysfunction present (H) 03/16/2024     Severe sepsis (H) 05/08/2025     Shingles 10/2016     SIRS (systemic inflammatory response syndrome) (H) 03/04/2021     Sleep apnea      UNSPEC CONSTIPATION 09/18/2006 9/18/2006   Has tried otc suppository and otc lax.    PreDM  PCOS    PSHx:  Past Surgical History:   Procedure Laterality Date     CHOLECYSTECTOMY, LAPOROSCOPIC      Cholecystectomy, Laparoscopic     COLONOSCOPY  2007?    Bathroom issue..results normal     COMBINED CYSTOSCOPY, RETROGRADES, EXCHANGE STENT URETER(S) Right 2/21/2022    Procedure: CYSTOSCOPY, WITH right RETROGRADE PYELOGRAM AND right URETERAL STENT PLACEMENT;  Surgeon: Doyle Palomares MD;  [place] [facility] [place]     OPEN REDUCTION INTERNAL FIXATION TOE(S)  4/13/2012    Procedure:OPEN REDUCTION INTERNAL FIXATION TOE(S); Open reduction internal fixation right proximal fifth metatarsal fracture-   Anes-choice block; Surgeon:LEY, JEFFREY DUANE; Location:[facility] OR     PICC SINGLE LUMEN PLACEMENT  3/20/2024     PICC TRIPLE LUMEN PLACEMENT  2/21/2022            FamHx:  Family History   Problem Relation Age of Onset     Neurologic Disorder  Father         MS     Heart Disease Father         irregular heart rate     Diabetes Father      Melanoma Father      Sleep Apnea Father      Other Cancer Father      Cancer Maternal Grandfather         lung     Other Cancer Maternal Grandfather      Cancer Paternal Grandmother         stomach     Other Cancer Paternal Grandmother      Cancer Mother      Other Cancer Mother      Thyroid Disease Mother      Gynecology Maternal Aunt         PCOS     Hypertension Maternal Grandmother      Anxiety Disorder Maternal Grandmother      Thyroid Disease Maternal Grandmother      Anxiety Disorder Sister      No family history of liver disease, liver cancer    SocHx:  Social History     Socioeconomic History     Marital status: Single     Spouse name: Not on file     Number of children: Not on file     Years of education: Not on file     Highest education level: Not on file   Occupational History     Employer: [employer]   Tobacco Use     Smoking status: Never     Smokeless tobacco: Never   Vaping Use     Vaping status: Never Used   Substance and Sexual Activity     Alcohol use: Yes     Comment: social     Drug use: No     Sexual activity: Not Currently     Partners: Male     Birth control/protection: Pill   Other Topics Concern     Parent/sibling w/ CABG, MI or angioplasty before 65F 55M? No   Social History Narrative    , 3rd-5th grade     Social Drivers of Health     Financial Resource Strain: Low Risk  (5/8/2025)    Financial Resource Strain      Within the past 12 months, have you or your family members you live with been unable to get utilities (heat, electricity) when it was really needed?: No   Food Insecurity: Low Risk  (5/8/2025)    Food Insecurity      Within the past 12 months, did you worry that your food would run out before you got money to buy more?: No      Within the past 12 months, did the food you bought just not last and you didn t have money to get more?: No   Recent Concern:  Food Insecurity - High Risk (3/23/2025)    Food Insecurity      Within the past 12 months, did you worry that your food would run out before you got money to buy more?: Yes      Within the past 12 months, did the food you bought just not last and you didn t have money to get more?: No   Transportation Needs: High Risk (5/8/2025)    Transportation Needs      Within the past 12 months, has lack of transportation kept you from medical appointments, getting your medicines, non-medical meetings or appointments, work, or from getting things that you need?: Yes   Physical Activity: Inactive (3/21/2024)    Exercise Vital Sign      Days of Exercise per Week: 0 days      Minutes of Exercise per Session: 0 min   Stress: No Stress Concern Present (12/4/2020)    [employer] [employer] - Occupational Stress Questionnaire      Feeling of Stress : Only a little   Social Connections: Unknown (3/1/2022)    Social Connection and Isolation Panel [NHANES]      Frequency of Communication with Friends and Family: Twice a week      Frequency of Social Gatherings with Friends and Family: Twice a week      Attends Congregational Services: Not on file      Active Member of Clubs or Organizations: Not on file      Attends Club or Organization Meetings: Not on file      Marital Status: Not on file   Interpersonal Safety: Low Risk  (5/8/2025)    Interpersonal Safety      Do you feel physically and emotionally safe where you currently live?: Yes      Within the past 12 months, have you been hit, slapped, kicked or otherwise physically hurt by someone?: No      Within the past 12 months, have you been humiliated or emotionally abused in other ways by your partner or ex-partner?: No   Recent Concern: Interpersonal Safety - High Risk (3/23/2025)    Interpersonal Safety      Do you feel physically and emotionally safe where you currently live?: No      Within the past 12 months, have you been hit, slapped, kicked or otherwise physically  hurt by someone?: No      Within the past 12 months, have you been humiliated or emotionally abused in other ways by your partner or ex-partner?: No   Housing Stability: High Risk (5/8/2025)    Housing Stability      Do you have housing? : No      Are you worried about losing your housing?: No   Rare alcohol use - last November       Medications:  Current Outpatient Medications   Medication Sig Dispense Refill     acetaminophen (TYLENOL) 650 MG CR tablet Take 1,300 mg by mouth every 8 hours as needed for mild pain or fever.       apixaban ANTICOAGULANT (ELIQUIS) 5 MG tablet Take 1 tablet (5 mg) by mouth 2 times daily. 90 tablet 3     aspirin 81 MG EC tablet Take 81 mg by mouth every morning.       bumetanide (BUMEX) 2 MG tablet Take 4 mg by mouth 2 times daily.       empagliflozin (JARDIANCE) 10 MG TABS tablet Take 10 mg by mouth daily.       escitalopram (LEXAPRO) 10 MG tablet Take 10 mg by mouth every morning.       miconazole (MICATIN) 2 % external powder Apply topically 2 times daily as needed for itching or other.       potassium chloride sheila ER (KLOR-CON M10) 10 MEQ CR tablet Take 30 mEq by mouth every morning.       sennosides (SENOKOT) 8.6 MG tablet Take 1 tablet by mouth 2 times daily as needed for constipation. 20 tablet 0     simvastatin (ZOCOR) 10 MG tablet Take 1 tablet (10 mg) by mouth at bedtime. 90 tablet 3     spironolactone (ALDACTONE) 25 MG tablet Take 1 tablet (25 mg) by mouth daily. 90 tablet 3     No current facility-administered medications for this visit.     No OTCs, herbals    Allergies:  No Known Allergies      Objective:  /75   Pulse 86   Temp 98.1  F (36.7  C) (Oral)   SpO2 97%   Constitutional: pleasant woman in NAD  Eyes: non icteric  Respiratory: on 3L per nasal cannula. 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  Abd: Non-distended  Skin: No jaundice  Psychiatric: normal mood and orientation    Labs:  Last Comprehensive Metabolic Panel:  Sodium    Date Value Ref Range Status   07/02/2025 137 135 - 145 mmol/L Final   03/07/2021 139 133 - 144 mmol/L Final     Potassium   Date Value Ref Range Status   07/02/2025 4.1 3.4 - 5.3 mmol/L Final   09/06/2022 3.6 3.5 - 5.0 mmol/L Final   03/07/2021 4.3 3.4 - 5.3 mmol/L Final     Chloride   Date Value Ref Range Status   07/02/2025 100 98 - 107 mmol/L Final   09/06/2022 98 98 - 107 mmol/L Final   03/07/2021 105 94 - 109 mmol/L Final     Carbon Dioxide   Date Value Ref Range Status   03/07/2021 30 20 - 32 mmol/L Final     Carbon Dioxide (CO2)   Date Value Ref Range Status   07/02/2025 25 22 - 29 mmol/L Final   09/06/2022 25 22 - 31 mmol/L Final     Anion Gap   Date Value Ref Range Status   07/02/2025 12 7 - 15 mmol/L Final   09/06/2022 11 5 - 18 mmol/L Final   03/07/2021 4 3 - 14 mmol/L Final     Glucose   Date Value Ref Range Status   07/02/2025 93 70 - 99 mg/dL Final   09/06/2022 94 70 - 125 mg/dL Final   03/07/2021 99 70 - 99 mg/dL Final     GLUCOSE BY METER POCT   Date Value Ref Range Status   05/10/2025 88 70 - 99 mg/dL Final     Urea Nitrogen   Date Value Ref Range Status   07/02/2025 24.1 (H) 6.0 - 20.0 mg/dL Final   09/06/2022 22 8 - 22 mg/dL Final   03/07/2021 12 7 - 30 mg/dL Final     Creatinine   Date Value Ref Range Status   07/02/2025 0.86 0.51 - 0.95 mg/dL Final   03/07/2021 0.62 0.52 - 1.04 mg/dL Final     GFR Estimate   Date Value Ref Range Status   07/02/2025 82 >60 mL/min/1.73m2 Final     Comment:     eGFR calculated using 2021 CKD-EPI equation.   03/07/2021 >90 >60 mL/min/[1.73_m2] Final     Comment:     Non  GFR Calc  Starting 12/18/2018, serum creatinine based estimated GFR (eGFR) will be   calculated using the Chronic Kidney Disease Epidemiology Collaboration   (CKD-EPI) equation.       Calcium   Date Value Ref Range Status   07/02/2025 10.0 8.8 - 10.4 mg/dL Final   03/07/2021 9.4 8.5 - 10.1 mg/dL Final     Bilirubin Total   Date Value Ref Range Status   07/02/2025 2.1 (H) <=1.2  "mg/dL Final   03/07/2021 1.1 0.2 - 1.3 mg/dL Final     Alkaline Phosphatase   Date Value Ref Range Status   07/02/2025 83 40 - 150 U/L Final   03/07/2021 57 40 - 150 U/L Final     ALT   Date Value Ref Range Status   07/02/2025 <5 0 - 50 U/L Final   03/07/2021 17 0 - 50 U/L Final     AST   Date Value Ref Range Status   07/02/2025 19 0 - 45 U/L Final   03/07/2021 18 0 - 45 U/L Final       Lab Results   Component Value Date    WBC 4.1 05/01/2024    WBC 7.3 03/07/2021     Lab Results   Component Value Date    RBC 5.54 05/01/2024    RBC 4.91 03/07/2021     Lab Results   Component Value Date    HGB 14.9 05/01/2024    HGB 12.5 03/07/2021     Lab Results   Component Value Date    HCT 48.9 05/01/2024    HCT 42.8 03/07/2021     Lab Results   Component Value Date    MCV 88 05/01/2024    MCV 87 03/07/2021     Lab Results   Component Value Date    MCH 26.9 05/01/2024    MCH 25.5 03/07/2021     Lab Results   Component Value Date    MCHC 30.5 05/01/2024    MCHC 29.2 03/07/2021     Lab Results   Component Value Date    RDW 16.9 05/01/2024    RDW 15.6 03/07/2021     Lab Results   Component Value Date     05/01/2024     03/08/2021       INR   Date Value Ref Range Status   07/02/2025 1.36 (H) 0.85 - 1.15 Final   04/19/2013 0.96 0.86 - 1.14 Final        Previous work-up:   Lab Results   Component Value Date    HEPBANG Nonreactive 04/18/2024    HBCAB Nonreactive 04/18/2024    AUSAB 23.40 04/18/2024    HCVAB Nonreactive 04/18/2024    TSH 3.52 02/12/2025    CHOL 81 02/12/2025    HDL 11 (L) 02/12/2025    LDL 43 02/12/2025    TRIG 134 02/12/2025    A1C 5.6 02/12/2025      No results found for: \"SPECDES\", \"LDRESULTS\"    Imaging: Reviewed in EHR     Endoscopy: no upper endoscopy    Independently reviewed labs and imaging.     Assessment/Plan:    Ms. Enamorado is a 49-year-old with a history of obesity, NARCISA, diastolic heart failure with pulmonary hypertension, and MS who presents for evaluation of abnormal liver imaging.    She has " risk factors for metabolic dysfunction associated steatotic liver disease, also at risk for liver dysfunction related to chronic diastolic heart failure pulmonary hypertension.     Discussed that patients with congestive hepatopathy can have a lobular appearing liver on imaging without clear cirrhosis on biopsy. It does not appear that she has cirrhosis at this time. Discussed if patients have cirrhosis they should undergo liver cancer screening. She will get a RUQ US with doppler next month and then again before her RTC in 12 months. There is no need for a liver biopsy at this time as it will not change course of treatment.    Discussed I would continue to abstain from alcohol as she is doing and continue a low salt and high protein diet. Agree with working with her cardiology team and optimizing her volume overload, discussed elevated pulmonary pressures leading to hepatic congestion could accelerate liver damage.    -Right upper quadrant ultrasound next month, then again before RTC in 12 months.    Orders Placed This Encounter   Procedures     US Abdomen Limited w Abdomen Doppler Limited     RTC 12 months.    Gertrudis Pacheco,   Internal Medicine PGY-1      Attestation signed by Cyndie Joyner MD at 7/2/2025  4:58 PM:      Physician Attestation  I saw this patient with the resident and agree with the resident/fellow's findings and plan of care as documented in the note.      Key findings: Discussed that she has evidence of liver disease likely related to metabolic liver disease and chronic hepatic congestion from a pulmonary hypertension.  She does not have cirrhosis and does not have portal hypertension that would put her at risk for portal portal hypertension.  Discussed the importance of optimizing her cardiac status to reduce pressure on her liver.  Discussed I do not have clear findings that she has advanced fibrosis or cirrhosis.  Livers can appear lobular on imaging with this clinical head congestion.  We  would need to do a biopsy to differentiate from which scarring she has but right now I would not recommend this as it would not change her management.  Would like to monitor her liver with ultrasounds periodically.    Please see A&P for additional details of medical decision making.    I have personally reviewed the following data over the past 24 hrs:    @SJATEE39(WBC)@  \   @HDMZQI63(HGB)@   / @TZUSOT13(PLT)@     @PGLXPT50(NA)@ @ETLOOX45(Chloride)@ @LZIJQO42(BUN)@ /  @TSFSWT29(GLC)@   @CUNWQF38(potassium)@ @YCSVXV50(CO2)@ @FRYYLP35(CR)@ \     ALT: @UPURXA32(ALT)@ AST: @RFKWYJ71(AST)@ AP: @XWTLCX99(ALKPHOS)@ TBILI: @SNTRND61(BILITOTAL)@   ALB: @TWPGZB74(ALBUMIN)@ TOT PROTEIN: @PEKDGT97(PROTTOTAL)@ LIPASE: @YEROPY40(LIPASE)@     INR:  @ZRVGZS49(INR)@ PTT:  @AWUSRK75(PTT)@   D-dimer:  @LQCDKI85(Ddimer,dimer,DD)@ Fibrinogen:  @UAJCYZ72(FIBR)@         Cyndie Joyner MD  Date of Service (when I saw the patient): 07/02/25    Again, thank you for allowing me to participate in the care of your patient.        Sincerely,        Cyndie Joyner MD    Electronically signed

## 2025-07-02 NOTE — TELEPHONE ENCOUNTER
Health Call Center    Phone Message    May a detailed message be left on voicemail: yes     Reason for Call: Order(s): Home Care Orders: Other: [facility] will be sending via fax to [place], orders for Christin Holt to sign. They would like to increase Bum[employer] & order to repeat BMP. Once fax is received and signed by Christin, please fax back to [facility] at [phone].      Action Taken: Other: Cardiology     Travel Screening: Not Applicable     Thank you!  [facility]

## 2025-07-07 NOTE — TELEPHONE ENCOUNTER
Christin out of office until next week. Will have her do it then. Tomasa Whittaker RN Cardiology July 7, 2025, 2:34 PM

## 2025-07-08 ENCOUNTER — VIRTUAL VISIT (OUTPATIENT)
Dept: CARDIOLOGY | Facility: CLINIC | Age: 50
End: 2025-07-08
Payer: COMMERCIAL

## 2025-07-08 DIAGNOSIS — I50.32 CHRONIC HEART FAILURE WITH PRESERVED EJECTION FRACTION (H): ICD-10-CM

## 2025-07-08 PROCEDURE — 98004 SYNCH AUDIO-VIDEO EST SF 10: CPT | Performed by: NURSE PRACTITIONER

## 2025-07-08 NOTE — PROGRESS NOTES
"Bess is a 49 year old who is being evaluated via a billable video visit.   Vitals - Patient Reported  Systolic (Patient Reported): 111  Diastolic (Patient Reported): 77  Weight (Patient Reported): 118.2 kg (260 lb 8 oz)  Height (Patient Reported): 162.6 cm (5' 4\")  BMI (Based on Pt Reported Ht/Wt): 44.71  Pulse (Patient Reported): 86    Review Of Systems  Skin: NEGATIVE  Eyes:Ears/Nose/Throat: NEGATIVE  Respiratory: SOB, 02-3LPM continuous  Cardiovascular:  energy level normal, edema in LE  Gastrointestinal: NEGATIVE  Genitourinary:NEGATIVE   Musculoskeletal: NEGATIVE  Neurologic: NEGATIVE  Psychiatric: NEGATIVE  Hematologic/Lymphatic/Immunologic: NEGATIVE  Endocrine:  NEGATIVE    Criss Loco LPN  How would you like to obtain your AVS? MyChart  If the video visit is dropped, the invitation should be resent by: Text to cell phone: [phone]  Will anyone else be joining your video visit? No  {If patient encounters technical issues they should call [phone] :[id number]}  Video-Visit Details    Type of service:  Video Visit   Video End Time:{video visit start/end time for provider to select:697403}  Originating Location (pt. Location): {video visit patient location:622030::\"Home\"}  {PROVIDER LOCATION On-site should be selected for visits conducted from your clinic location or adjoining [facility] hospital, academic office, or other nearby [facility] building. Off-site should be selected for all other provider locations, including home:[id number]}  Distant Location (provider location):  {virtual location provider:[id number]}  Platform used for Video Visit: [employer]oolala  "

## 2025-07-08 NOTE — PROGRESS NOTES
Cardiology Virtual Visit Progress Note    Service Date: July 8, 2025    Ms. Bess Enamorado is a 49 year old female who is being evaluated via a billable VIDEO visit.    Patient has given verbal consent for virtual visit?  Yes    History of Present Illness    Bess Enamorado is a very pleasant 49 year old female with a past medical history notable for hypertension, history of pulmonary embolism, severe pulmonary hypertension, NARCISA on CPAP, morbid obesity with BMI around 56, chronic HFpEF with primarily right-sided heart failure and RV dysfunction, multiple sclerosis, lymphedema, and binge eating disorder.     Over the years, patient has had progressive RV dilation and dysfunction on her echocardiograms. Her most recent echocardiogram in July of this year showed severe pulmonary hypertension with RVSP 82 mm hg +RA, and IVC diameter >2.1 cm collapsing <50% with sniff suggesting a high RA pressure estimated at 15 mmHg or greater. Her LV function is hyperdynamic.  She has mild mitral stenosis.     I saw her in January 2025, and at that time she appeared volume up with increased weight trend.  Bumex dosing was increased from 4 mg daily to 6 mg daily.  In the interim, she was admitted at [facility] on 2/11/2025 following a fall.  Cardiology was consulted due to worsening shortness of breath and volume overload and as she was found to have acute  on chronic HFpEF/right-sided heart failure exacerbation.  276# at discharge.     She struggles to check weight consistently, has dietary indiscretion.  On 3/3 she reported a weight of 256#, then 265# the following week and 269# on 3/17/25 at clinic.  She is on supplemental O2 at 3L most of the day, CPAP at night.  She reported worsening LE edema with this acute weight gain > gets around with wheelchair, has had weeping edema to her left leg (has wound care services).  With wrapping her legs, she has noticed more abdominal bloating.  Bumex augmented further (up to 8 mg  daily).  Discussed infusion clinic as next step if no improvement.     On 3/22/25, she was admitted back to [facility] with cystitis with severe sepsis, plus CHF exacerbation with leg edema.  Admitting weight 282#.  US left leg + for DVT, Eliquis started.  Discharged at 262#.  She went back to ER 4/2/25 for fever, LLE swelling and pain (Hx lymphedema with increased redness and warmth of the extermity).  Dx with cellulitis.  Tx with antibiotics and discharged 4/7.     I saw her in clinic following discharge, and from a cardiac standpoint things were stable.  She had a new left flank rash with some dull left-sided back discomfort.  I referred her for further evaluation, and she was diagnosed with shingles.  We made no changes to her cardiac regimen at that visit.  When I saw her in clinic, she was asymptomatic at a weight of 257#.     Patient was again hospitalized from 5/8 - 5/19/2025 for sepsis due to cellulitis of her lower extremities (presented with leg swelling and fever).       Cardiology was consulted during this admission.  Echocardiogram showed normal LV, reduced RV function with PA systolic 95-1 100+ RAP.  She was hypotensive on arrival, and started on dobutamine plus IV Lasix gtt.  Her weight after discharge 5/23 was 244 pounds.  She saw her PCP after discharge.  At that visit, she was felt to be relatively euvolemic.  Some diuretic adjustments have been made while she has been at TCU.  Patient continues to receive OT/lymphedema therapies through home care services.    Interval 7/8/25    Patient overall is doing well.  She feels well on her current diuretic regimen and is working closely with her therapist in regards to healthy diet choices and improvement overall.  She notes that she has no side effects on the higher Bumex doses - she is voiding larger quantities of urine with each void and feels the medication is lasting a bit longer.  She is still able to get restful sleep between using the  marenoom.  __________________________________________________________________         Assessment and Impression:     Severe right-sided heart failure with acute on chronic HFpEF  Severe pulmonary hypertension, oxygen dependent on 3 L/min at baseline  Chronic lymphedema  NARCISA on CPAP  Multiple sclerosis  Morbid obesity  History of pulmonary embolism  Skin breakdown  Unilateral left flank clustered rash         Recommendations and Plan:     No changes today.  Patient is doing very well working with her therapist for healthy lifestyle intervention changes and management.  Repeat labs in about 3-4 weeks.  ANNAMARIE follow up 4-weeks, virtual OK.  __________________________________________________________________    Thank you for the opportunity to participate in this pleasant patient's care.   We would be happy to see her sooner if needed for any concerns in the meantime.     I spent 16 minutes on the date of encouter of which 11 minutes were spent in medical discussion    JULIETA Anguiano, CNP, [facility]  Nurse Practitioner  [facility]      Provider location: on site  Patient location: Home  Total time on VIDEO call: 11 minutes    Today's clinic visit entailed:  The following tests were independently interpreted by me as noted in my documentation: labs  Ordering of each unique test  Prescription drug management  The level of medical decision making during this visit was of moderate complexity.  Review of the result(s) of each unique test - cardiac testing, cardiac imaging, labs  Care everywhere reviewed for additional records to facilitate comprehensive patient care.  Recent hospitalization records and notes reviewed to facilitate comprehensive care coordination.    CURRENT MEDICATIONS:  Current Outpatient Medications   Medication Sig Dispense Refill    acetaminophen (TYLENOL) 650 MG CR tablet Take 1,300 mg by mouth every 8 hours as needed for mild pain or fever.      apixaban ANTICOAGULANT (ELIQUIS) 5  MG tablet Take 1 tablet (5 mg) by mouth 2 times daily. 90 tablet 3    aspirin 81 MG EC tablet Take 81 mg by mouth every morning.      bumetanide (BUMEX) 2 MG tablet Take 4 mg by mouth 2 times daily.      empagliflozin (JARDIANCE) 10 MG TABS tablet Take 10 mg by mouth daily.      escitalopram (LEXAPRO) 10 MG tablet Take 10 mg by mouth every morning.      miconazole (MICATIN) 2 % external powder Apply topically 2 times daily as needed for itching or other.      potassium chloride sheila ER (KLOR-CON M10) 10 MEQ CR tablet Take 30 mEq by mouth every morning.      sennosides (SENOKOT) 8.6 MG tablet Take 1 tablet by mouth 2 times daily as needed for constipation. 20 tablet 0    simvastatin (ZOCOR) 10 MG tablet Take 1 tablet (10 mg) by mouth at bedtime. 90 tablet 3    spironolactone (ALDACTONE) 25 MG tablet Take 1 tablet (25 mg) by mouth daily. 90 tablet 3       ALLERGIES  No Known Allergies    PAST MEDICAL, SURGICAL, FAMILY HISTORY:  History was reviewed and updated as needed, see medical record.    SOCIAL HISTORY:  Social History     Socioeconomic History    Marital status: Single     Spouse name: Not on file    Number of children: Not on file    Years of education: Not on file    Highest education level: Not on file   Occupational History     Employer: [employer]   Tobacco Use    Smoking status: Never    Smokeless tobacco: Never   Vaping Use    Vaping status: Never Used   Substance and Sexual Activity    Alcohol use: Yes     Comment: social    Drug use: No    Sexual activity: Not Currently     Partners: Male     Birth control/protection: Pill   Other Topics Concern    Parent/sibling w/ CABG, MI or angioplasty before 65F 55M? No   Social History Narrative    , 3rd-5th grade     Social Drivers of Health     Financial Resource Strain: Low Risk  (5/8/2025)    Financial Resource Strain     Within the past 12 months, have you or your family members you live with been unable to get utilities (heat,  electricity) when it was really needed?: No   Food Insecurity: Low Risk  (5/8/2025)    Food Insecurity     Within the past 12 months, did you worry that your food would run out before you got money to buy more?: No     Within the past 12 months, did the food you bought just not last and you didn t have money to get more?: No   Recent Concern: Food Insecurity - High Risk (3/23/2025)    Food Insecurity     Within the past 12 months, did you worry that your food would run out before you got money to buy more?: Yes     Within the past 12 months, did the food you bought just not last and you didn t have money to get more?: No   Transportation Needs: High Risk (5/8/2025)    Transportation Needs     Within the past 12 months, has lack of transportation kept you from medical appointments, getting your medicines, non-medical meetings or appointments, work, or from getting things that you need?: Yes   Physical Activity: Inactive (3/21/2024)    Exercise Vital Sign     Days of Exercise per Week: 0 days     Minutes of Exercise per Session: 0 min   Stress: No Stress Concern Present (12/4/2020)    [employer] [place] [employer] - Occupational Stress Questionnaire     Feeling of Stress : Only a little   Social Connections: Unknown (3/1/2022)    Social Connection and Isolation Panel [NHANES]     Frequency of Communication with Friends and Family: Twice a week     Frequency of Social Gatherings with Friends and Family: Twice a week     Attends Rastafari Services: Not on file     Active Member of Clubs or Organizations: Not on file     Attends Club or Organization Meetings: Not on file     Marital Status: Not on file   Interpersonal Safety: Low Risk  (5/8/2025)    Interpersonal Safety     Do you feel physically and emotionally safe where you currently live?: Yes     Within the past 12 months, have you been hit, slapped, kicked or otherwise physically hurt by someone?: No     Within the past 12 months, have you been  humiliated or emotionally abused in other ways by your partner or ex-partner?: No   Recent Concern: Interpersonal Safety - High Risk (3/23/2025)    Interpersonal Safety     Do you feel physically and emotionally safe where you currently live?: No     Within the past 12 months, have you been hit, slapped, kicked or otherwise physically hurt by someone?: No     Within the past 12 months, have you been humiliated or emotionally abused in other ways by your partner or ex-partner?: No   Housing Stability: High Risk (5/8/2025)    Housing Stability     Do you have housing? : No     Are you worried about losing your housing?: No       Review of Systems:  Skin: negative  Eyes: negative  Ears/Nose/Throat: negative  Respiratory: No shortness of breath, dyspnea on exertion, cough, or hemoptysis  Cardiovascular: chronic LE swelling  Gastrointestinal: negative  Genitourinary: negative  Musculoskeletal: negative  Neurologic: negative  Hematologic/Lymphatic/Immunologic: negative  Endocrine: negative  PSYCH: Alert and oriented times 3; coherent speech  RESP: No cough, no audible wheezing, able to talk in full sentences    Remainder of the comprehensive physical exam is unable to be completed due to today's visit being completed via telephone call.    There were no vitals taken for this visit.   Wt Readings from Last 4 Encounters:   07/01/25 118.8 kg (262 lb)   06/16/25 118.4 kg (261 lb)   05/23/25 111 kg (244 lb 12.8 oz)   05/21/25 109 kg (240 lb 6.4 oz)     Recent Lab Results:  LIPID RESULTS:  Lab Results   Component Value Date    CHOL 81 02/12/2025    CHOL 162 02/11/2016    HDL 11 (L) 02/12/2025    HDL 64 02/11/2016    LDL 43 02/12/2025    LDL 75 02/11/2016    TRIG 134 02/12/2025    TRIG 113 02/11/2016    CHOLHDLRATIO 4.0 11/07/2011     LIVER ENZYME RESULTS:  Lab Results   Component Value Date    AST 19 07/02/2025    AST 18 03/07/2021    ALT <5 07/02/2025    ALT 17 03/07/2021     CBC RESULTS:  Lab Results   Component Value Date     "WBC 4.0 07/02/2025    WBC 7.3 03/07/2021    RBC 4.85 07/02/2025    RBC 4.91 03/07/2021    HGB 13.1 07/02/2025    HGB 12.5 03/07/2021    HCT 42.1 07/02/2025    HCT 42.8 03/07/2021    MCV 87 07/02/2025    MCV 87 03/07/2021    MCH 27.0 07/02/2025    MCH 25.5 (L) 03/07/2021    MCHC 31.1 (L) 07/02/2025    MCHC 29.2 (L) 03/07/2021    RDW 14.7 07/02/2025    RDW 15.6 (H) 03/07/2021     07/02/2025     03/08/2021     BMP RESULTS:  Lab Results   Component Value Date     07/02/2025     03/07/2021    POTASSIUM 4.1 07/02/2025    POTASSIUM 3.6 09/06/2022    POTASSIUM 4.3 03/07/2021    CHLORIDE 100 07/02/2025    CHLORIDE 98 09/06/2022    CHLORIDE 105 03/07/2021    CO2 25 07/02/2025    CO2 25 09/06/2022    CO2 30 03/07/2021    ANIONGAP 12 07/02/2025    ANIONGAP 11 09/06/2022    ANIONGAP 4 03/07/2021    GLC 93 07/02/2025    GLC 88 05/10/2025    GLC 94 09/06/2022    GLC 99 03/07/2021    BUN 24.1 (H) 07/02/2025    BUN 22 09/06/2022    BUN 12 03/07/2021    CR 0.86 07/02/2025    CR 0.62 03/07/2021    GFRESTIMATED 82 07/02/2025    GFRESTIMATED >90 03/07/2021    GFRESTBLACK >90 03/07/2021    EVITA 10.0 07/02/2025    EVITA 9.4 03/07/2021      A1C RESULTS:  Lab Results   Component Value Date    A1C 5.6 02/12/2025    A1C 5.7 (H) 02/13/2020     INR RESULTS:  Lab Results   Component Value Date    INR 1.36 (H) 07/02/2025    INR 1.30 (H) 03/23/2025    INR 0.96 04/19/2013       CC  Christin Holt, JULIETA CNP  [address]  [place] [address]    This note was completed in part using Dragon voice recognition software. Although reviewed after completion, some word and grammatical errors may occur.     Bess is a 49 year old who is being evaluated via a billable video visit.   Vitals - Patient Reported  Systolic (Patient Reported): 111  Diastolic (Patient Reported): 77  Weight (Patient Reported): 118.2 kg (260 lb 8 oz)  Height (Patient Reported): 162.6 cm (5' 4\")  BMI (Based on Pt Reported Ht/Wt): 44.71  Pulse " (Patient Reported): 86     Review Of Systems  Skin: NEGATIVE  Eyes:Ears/Nose/Throat: NEGATIVE  Respiratory: SOB, 02-3LPM continuous  Cardiovascular:  energy level normal, edema in LE  Gastrointestinal: NEGATIVE  Genitourinary:NEGATIVE   Musculoskeletal: NEGATIVE  Neurologic: NEGATIVE  Psychiatric: NEGATIVE  Hematologic/Lymphatic/Immunologic: NEGATIVE  Endocrine:  NEGATIVE

## 2025-07-08 NOTE — LETTER
7/8/2025    Mikala Luna MD  [id number]  [facility] [address]    RE: Bess Enamorado       Dear Colleague,     I had the pleasure of seeing Bess Enamorado in the [facility].      Cardiology Virtual Visit Progress Note    Service Date: July 8, 2025    Ms. Bess Enamorado is a 49 year old female who is being evaluated via a billable VIDEO visit.    Patient has given verbal consent for virtual visit?  Yes    History of Present Illness    Bess Enamorado is a very pleasant 49 year old female with a past medical history notable for hypertension, history of pulmonary embolism, severe pulmonary hypertension, NARCISA on CPAP, morbid obesity with BMI around 56, chronic HFpEF with primarily right-sided heart failure and RV dysfunction, multiple sclerosis, lymphedema, and binge eating disorder.     Over the years, patient has had progressive RV dilation and dysfunction on her echocardiograms. Her most recent echocardiogram in July of this year showed severe pulmonary hypertension with RVSP 82 mm hg +RA, and IVC diameter >2.1 cm collapsing <50% with sniff suggesting a high RA pressure estimated at 15 mmHg or greater. Her LV function is hyperdynamic.  She has mild mitral stenosis.     I saw her in January 2025, and at that time she appeared volume up with increased weight trend.  Bumex dosing was increased from 4 mg daily to 6 mg daily.  In the interim, she was admitted at [facility] on 2/11/2025 following a fall.  Cardiology was consulted due to worsening shortness of breath and volume overload and as she was found to have acute  on chronic HFpEF/right-sided heart failure exacerbation.  276# at discharge.     She struggles to check weight consistently, has dietary indiscretion.  On 3/3 she reported a weight of 256#, then 265# the following week and 269# on 3/17/25 at clinic.  She is on supplemental O2 at 3L most of the day, CPAP at night.  She reported worsening LE edema with this acute weight gain >  gets around with wheelchair, has had weeping edema to her left leg (has wound care services).  With wrapping her legs, she has noticed more abdominal bloating.  Bumex augmented further (up to 8 mg daily).  Discussed infusion clinic as next step if no improvement.     On 3/22/25, she was admitted back to [facility] with cystitis with severe sepsis, plus CHF exacerbation with leg edema.  Admitting weight 282#.  US left leg + for DVT, Eliquis started.  Discharged at 262#.  She went back to ER 4/2/25 for fever, LLE swelling and pain (Hx lymphedema with increased redness and warmth of the extermity).  Dx with cellulitis.  Tx with antibiotics and discharged 4/7.     I saw her in clinic following discharge, and from a cardiac standpoint things were stable.  She had a new left flank rash with some dull left-sided back discomfort.  I referred her for further evaluation, and she was diagnosed with shingles.  We made no changes to her cardiac regimen at that visit.  When I saw her in clinic, she was asymptomatic at a weight of 257#.     Patient was again hospitalized from 5/8 - 5/19/2025 for sepsis due to cellulitis of her lower extremities (presented with leg swelling and fever).       Cardiology was consulted during this admission.  Echocardiogram showed normal LV, reduced RV function with PA systolic 95-1 100+ RAP.  She was hypotensive on arrival, and started on dobutamine plus IV Lasix gtt.  Her weight after discharge 5/23 was 244 pounds.  She saw her PCP after discharge.  At that visit, she was felt to be relatively euvolemic.  Some diuretic adjustments have been made while she has been at [facility].  Patient continues to receive OT/lymphedema therapies through home care services.    Interval 7/8/25    Patient overall is doing well.  She feels well on her current diuretic regimen and is working closely with her therapist in regards to healthy diet choices and improvement overall.  She notes that she has no side effects on the higher  Bumex doses - she is voiding larger quantities of urine with each void and feels the medication is lasting a bit longer.  She is still able to get restful sleep between using the restroom.  __________________________________________________________________         Assessment and Impression:     Severe right-sided heart failure with acute on chronic HFpEF  Severe pulmonary hypertension, oxygen dependent on 3 L/min at baseline  Chronic lymphedema  NARCISA on CPAP  Multiple sclerosis  Morbid obesity  History of pulmonary embolism  Skin breakdown  Unilateral left flank clustered rash         Recommendations and Plan:     No changes today.  Patient is doing very well working with her therapist for healthy lifestyle intervention changes and management.  Repeat labs in about 3-4 weeks.  ANNAMARIE follow up 4-weeks, virtual OK.  __________________________________________________________________    Thank you for the opportunity to participate in this pleasant patient's care.   We would be happy to see her sooner if needed for any concerns in the meantime.     I spent 16 minutes on the date of encouter of which 11 minutes were spent in medical discussion    JULIETA Anguiano, CNP, [facility]  Nurse Practitioner  [facility]      Provider location: on site  Patient location: Home  Total time on VIDEO call: 11 minutes    Today's clinic visit entailed:  The following tests were independently interpreted by me as noted in my documentation: labs  Ordering of each unique test  Prescription drug management  The level of medical decision making during this visit was of moderate complexity.  Review of the result(s) of each unique test - cardiac testing, cardiac imaging, labs  Care everywhere reviewed for additional records to facilitate comprehensive patient care.  Recent hospitalization records and notes reviewed to facilitate comprehensive care coordination.    CURRENT MEDICATIONS:  Current Outpatient Medications    Medication Sig Dispense Refill     acetaminophen (TYLENOL) 650 MG CR tablet Take 1,300 mg by mouth every 8 hours as needed for mild pain or fever.       apixaban ANTICOAGULANT (ELIQUIS) 5 MG tablet Take 1 tablet (5 mg) by mouth 2 times daily. 90 tablet 3     aspirin 81 MG EC tablet Take 81 mg by mouth every morning.       bumetanide (BUMEX) 2 MG tablet Take 4 mg by mouth 2 times daily.       empagliflozin (JARDIANCE) 10 MG TABS tablet Take 10 mg by mouth daily.       escitalopram (LEXAPRO) 10 MG tablet Take 10 mg by mouth every morning.       miconazole (MICATIN) 2 % external powder Apply topically 2 times daily as needed for itching or other.       potassium chloride sheila ER (KLOR-CON M10) 10 MEQ CR tablet Take 30 mEq by mouth every morning.       sennosides (SENOKOT) 8.6 MG tablet Take 1 tablet by mouth 2 times daily as needed for constipation. 20 tablet 0     simvastatin (ZOCOR) 10 MG tablet Take 1 tablet (10 mg) by mouth at bedtime. 90 tablet 3     spironolactone (ALDACTONE) 25 MG tablet Take 1 tablet (25 mg) by mouth daily. 90 tablet 3       ALLERGIES  No Known Allergies    PAST MEDICAL, SURGICAL, FAMILY HISTORY:  History was reviewed and updated as needed, see medical record.    SOCIAL HISTORY:  Social History     Socioeconomic History     Marital status: Single     Spouse name: Not on file     Number of children: Not on file     Years of education: Not on file     Highest education level: Not on file   Occupational History     Employer: [employer] ([employer]   Tobacco Use     Smoking status: Never     Smokeless tobacco: Never   Vaping Use     Vaping status: Never Used   Substance and Sexual Activity     Alcohol use: Yes     Comment: social     Drug use: No     Sexual activity: Not Currently     Partners: Male     Birth control/protection: Pill   Other Topics Concern     Parent/sibling w/ CABG, MI or angioplasty before 65F 55M? No   Social History Narrative    , 3rd-5th grade     Social  Drivers of Health     Financial Resource Strain: Low Risk  (5/8/2025)    Financial Resource Strain      Within the past 12 months, have you or your family members you live with been unable to get utilities (heat, electricity) when it was really needed?: No   Food Insecurity: Low Risk  (5/8/2025)    Food Insecurity      Within the past 12 months, did you worry that your food would run out before you got money to buy more?: No      Within the past 12 months, did the food you bought just not last and you didn t have money to get more?: No   Recent Concern: Food Insecurity - High Risk (3/23/2025)    Food Insecurity      Within the past 12 months, did you worry that your food would run out before you got money to buy more?: Yes      Within the past 12 months, did the food you bought just not last and you didn t have money to get more?: No   Transportation Needs: High Risk (5/8/2025)    Transportation Needs      Within the past 12 months, has lack of transportation kept you from medical appointments, getting your medicines, non-medical meetings or appointments, work, or from getting things that you need?: Yes   Physical Activity: Inactive (3/21/2024)    Exercise Vital Sign      Days of Exercise per Week: 0 days      Minutes of Exercise per Session: 0 min   Stress: No Stress Concern Present (12/4/2020)    [employer] - Occupational Stress Questionnaire      Feeling of Stress : Only a little   Social Connections: Unknown (3/1/2022)    Social Connection and Isolation Panel [NHANES]      Frequency of Communication with Friends and Family: Twice a week      Frequency of Social Gatherings with Friends and Family: Twice a week      Attends Restorationist Services: Not on file      Active Member of Clubs or Organizations: Not on file      Attends Club or Organization Meetings: Not on file      Marital Status: Not on file   Interpersonal Safety: Low Risk  (5/8/2025)    Interpersonal Safety      Do you feel  physically and emotionally safe where you currently live?: Yes      Within the past 12 months, have you been hit, slapped, kicked or otherwise physically hurt by someone?: No      Within the past 12 months, have you been humiliated or emotionally abused in other ways by your partner or ex-partner?: No   Recent Concern: Interpersonal Safety - High Risk (3/23/2025)    Interpersonal Safety      Do you feel physically and emotionally safe where you currently live?: No      Within the past 12 months, have you been hit, slapped, kicked or otherwise physically hurt by someone?: No      Within the past 12 months, have you been humiliated or emotionally abused in other ways by your partner or ex-partner?: No   Housing Stability: High Risk (5/8/2025)    Housing Stability      Do you have housing? : No      Are you worried about losing your housing?: No       Review of Systems:  Skin: negative  Eyes: negative  Ears/Nose/Throat: negative  Respiratory: No shortness of breath, dyspnea on exertion, cough, or hemoptysis  Cardiovascular: chronic LE swelling  Gastrointestinal: negative  Genitourinary: negative  Musculoskeletal: negative  Neurologic: negative  Hematologic/Lymphatic/Immunologic: negative  Endocrine: negative  PSYCH: Alert and oriented times 3; coherent speech  RESP: No cough, no audible wheezing, able to talk in full sentences    Remainder of the comprehensive physical exam is unable to be completed due to today's visit being completed via telephone call.    There were no vitals taken for this visit.   Wt Readings from Last 4 Encounters:   07/01/25 118.8 kg (262 lb)   06/16/25 118.4 kg (261 lb)   05/23/25 111 kg (244 lb 12.8 oz)   05/21/25 109 kg (240 lb 6.4 oz)     Recent Lab Results:  LIPID RESULTS:  Lab Results   Component Value Date    CHOL 81 02/12/2025    CHOL 162 02/11/2016    HDL 11 (L) 02/12/2025    HDL 64 02/11/2016    LDL 43 02/12/2025    LDL 75 02/11/2016    TRIG 134 02/12/2025    TRIG 113 02/11/2016     CHOLHDLRATIO 4.0 11/07/2011     LIVER ENZYME RESULTS:  Lab Results   Component Value Date    AST 19 07/02/2025    AST 18 03/07/2021    ALT <5 07/02/2025    ALT 17 03/07/2021     CBC RESULTS:  Lab Results   Component Value Date    WBC 4.0 07/02/2025    WBC 7.3 03/07/2021    RBC 4.85 07/02/2025    RBC 4.91 03/07/2021    HGB 13.1 07/02/2025    HGB 12.5 03/07/2021    HCT 42.1 07/02/2025    HCT 42.8 03/07/2021    MCV 87 07/02/2025    MCV 87 03/07/2021    MCH 27.0 07/02/2025    MCH 25.5 (L) 03/07/2021    MCHC 31.1 (L) 07/02/2025    MCHC 29.2 (L) 03/07/2021    RDW 14.7 07/02/2025    RDW 15.6 (H) 03/07/2021     07/02/2025     03/08/2021     BMP RESULTS:  Lab Results   Component Value Date     07/02/2025     03/07/2021    POTASSIUM 4.1 07/02/2025    POTASSIUM 3.6 09/06/2022    POTASSIUM 4.3 03/07/2021    CHLORIDE 100 07/02/2025    CHLORIDE 98 09/06/2022    CHLORIDE 105 03/07/2021    CO2 25 07/02/2025    CO2 25 09/06/2022    CO2 30 03/07/2021    ANIONGAP 12 07/02/2025    ANIONGAP 11 09/06/2022    ANIONGAP 4 03/07/2021    GLC 93 07/02/2025    GLC 88 05/10/2025    GLC 94 09/06/2022    GLC 99 03/07/2021    BUN 24.1 (H) 07/02/2025    BUN 22 09/06/2022    BUN 12 03/07/2021    CR 0.86 07/02/2025    CR 0.62 03/07/2021    GFRESTIMATED 82 07/02/2025    GFRESTIMATED >90 03/07/2021    GFRESTBLACK >90 03/07/2021    EVITA 10.0 07/02/2025    EVITA 9.4 03/07/2021      A1C RESULTS:  Lab Results   Component Value Date    A1C 5.6 02/12/2025    A1C 5.7 (H) 02/13/2020     INR RESULTS:  Lab Results   Component Value Date    INR 1.36 (H) 07/02/2025    INR 1.30 (H) 03/23/2025    INR 0.96 04/19/2013       CC  Christin Holt, APRN CNP  [address]  [place] [address]    This note was completed in part using Dragon voice recognition software. Although reviewed after completion, some word and grammatical errors may occur.     Bess is a 49 year old who is being evaluated via a billable video visit.   Vitals -  "Patient Reported  Systolic (Patient Reported): 111  Diastolic (Patient Reported): 77  Weight (Patient Reported): 118.2 kg (260 lb 8 oz)  Height (Patient Reported): 162.6 cm (5' 4\")  BMI (Based on Pt Reported Ht/Wt): 44.71  Pulse (Patient Reported): 86     Review Of Systems  Skin: NEGATIVE  Eyes:Ears/Nose/Throat: NEGATIVE  Respiratory: SOB, 02-3LPM continuous  Cardiovascular:  energy level normal, edema in LE  Gastrointestinal: NEGATIVE  Genitourinary:NEGATIVE   Musculoskeletal: NEGATIVE  Neurologic: NEGATIVE  Psychiatric: NEGATIVE  Hematologic/Lymphatic/Immunologic: NEGATIVE  Endocrine:  NEGATIVE       Thank you for allowing me to participate in the care of your patient.      Sincerely,     JULIETA Anguiano CNP     [facility]  cc:   JULIETA Anguiaon CNP  [address]  [place] [address]      "

## 2025-07-16 DIAGNOSIS — Z53.9 DIAGNOSIS NOT YET DEFINED: Primary | ICD-10-CM

## 2025-07-16 PROCEDURE — G0179 MD RECERTIFICATION HHA PT: HCPCS | Performed by: FAMILY MEDICINE

## 2025-07-17 ENCOUNTER — TELEPHONE (OUTPATIENT)
Dept: CARDIOLOGY | Facility: CLINIC | Age: 50
End: 2025-07-17
Payer: COMMERCIAL

## 2025-07-17 NOTE — TELEPHONE ENCOUNTER
Request to fax orders for BMP lab draw to [facility].  [phone], signed by Christin RENDON CNP.  Completed.    Valeria Barton RN on 7/17/2025 at 1:38 PM

## 2025-07-21 NOTE — TELEPHONE ENCOUNTER
Christin Holt CNP had visit with pt on 7/8/25. Pt was stable. She does not recommend any med changes at this time.   She will follow up with pt again next month.     Heather Morris RN

## 2025-07-22 ENCOUNTER — APPOINTMENT (OUTPATIENT)
Dept: RADIOLOGY | Facility: HOSPITAL | Age: 50
End: 2025-07-22
Attending: EMERGENCY MEDICINE
Payer: COMMERCIAL

## 2025-07-22 ENCOUNTER — HOSPITAL ENCOUNTER (INPATIENT)
Facility: HOSPITAL | Age: 50
End: 2025-07-22
Attending: EMERGENCY MEDICINE | Admitting: INTERNAL MEDICINE
Payer: COMMERCIAL

## 2025-07-22 ENCOUNTER — APPOINTMENT (OUTPATIENT)
Dept: CT IMAGING | Facility: HOSPITAL | Age: 50
End: 2025-07-22
Attending: EMERGENCY MEDICINE
Payer: COMMERCIAL

## 2025-07-22 DIAGNOSIS — E87.6 HYPOKALEMIA: ICD-10-CM

## 2025-07-22 DIAGNOSIS — A41.9 SEPTIC SHOCK (H): ICD-10-CM

## 2025-07-22 DIAGNOSIS — J96.11 CHRONIC RESPIRATORY FAILURE WITH HYPOXIA (H): ICD-10-CM

## 2025-07-22 DIAGNOSIS — R78.81 BACTEREMIA: ICD-10-CM

## 2025-07-22 DIAGNOSIS — L30.4 INTERTRIGO: ICD-10-CM

## 2025-07-22 DIAGNOSIS — R65.21 SEPTIC SHOCK (H): ICD-10-CM

## 2025-07-22 DIAGNOSIS — J96.01 ACUTE RESPIRATORY FAILURE WITH HYPOXIA (H): ICD-10-CM

## 2025-07-22 DIAGNOSIS — I89.0 LYMPHEDEMA: ICD-10-CM

## 2025-07-22 DIAGNOSIS — Z71.89 OTHER SPECIFIED COUNSELING: Chronic | ICD-10-CM

## 2025-07-22 DIAGNOSIS — I50.9 ACUTE ON CHRONIC CONGESTIVE HEART FAILURE, UNSPECIFIED HEART FAILURE TYPE (H): Primary | ICD-10-CM

## 2025-07-22 DIAGNOSIS — J96.21 ACUTE AND CHRONIC RESPIRATORY FAILURE WITH HYPOXIA (H): ICD-10-CM

## 2025-07-22 LAB
ACB COMPLEX DNA BLD POS QL NAA+NON-PROBE: NOT DETECTED
ALBUMIN SERPL BCG-MCNC: 3.2 G/DL (ref 3.5–5.2)
ALBUMIN SERPL BCG-MCNC: 4 G/DL (ref 3.5–5.2)
ALBUMIN UR-MCNC: NEGATIVE MG/DL
ALP SERPL-CCNC: 69 U/L (ref 40–150)
ALP SERPL-CCNC: 88 U/L (ref 40–150)
ALT SERPL W P-5'-P-CCNC: 6 U/L (ref 0–50)
ALT SERPL W P-5'-P-CCNC: 7 U/L (ref 0–50)
ANION GAP SERPL CALCULATED.3IONS-SCNC: 15 MMOL/L (ref 7–15)
ANION GAP SERPL CALCULATED.3IONS-SCNC: 17 MMOL/L (ref 7–15)
APPEARANCE UR: CLEAR
APTT PPP: 35 SECONDS (ref 22–38)
AST SERPL W P-5'-P-CCNC: 17 U/L (ref 0–45)
AST SERPL W P-5'-P-CCNC: 42 U/L (ref 0–45)
ATRIAL RATE - MUSE: 110 BPM
B FRAGILIS DNA BLD POS QL NAA+NON-PROBE: NOT DETECTED
BACTERIA #/AREA URNS HPF: ABNORMAL /HPF
BASE EXCESS BLDA CALC-SCNC: -0.7 MMOL/L (ref -3–3)
BASE EXCESS BLDV CALC-SCNC: -2.7 MMOL/L (ref -3–3)
BASE EXCESS BLDV CALC-SCNC: -5.4 MMOL/L (ref -3–3)
BASE EXCESS BLDV CALC-SCNC: 0.2 MMOL/L (ref -3–3)
BASOPHILS # BLD AUTO: 0 10E3/UL (ref 0–0.2)
BASOPHILS NFR BLD AUTO: 0 %
BILIRUB SERPL-MCNC: 2.6 MG/DL
BILIRUB SERPL-MCNC: 2.7 MG/DL
BILIRUB UR QL STRIP: NEGATIVE
BILIRUBIN DIRECT (ROCHE PRO & PURE): 0.32 MG/DL (ref 0–0.45)
BUN SERPL-MCNC: 21.7 MG/DL (ref 6–20)
BUN SERPL-MCNC: 22.6 MG/DL (ref 6–20)
C ALBICANS DNA BLD POS QL NAA+NON-PROBE: NOT DETECTED
C AURIS DNA BLD POS QL NAA+NON-PROBE: NOT DETECTED
C GATTII+NEOFOR DNA BLD POS QL NAA+N-PRB: NOT DETECTED
C GLABRATA DNA BLD POS QL NAA+NON-PROBE: NOT DETECTED
C KRUSEI DNA BLD POS QL NAA+NON-PROBE: NOT DETECTED
C PARAP DNA BLD POS QL NAA+NON-PROBE: NOT DETECTED
C TROPICLS DNA BLD POS QL NAA+NON-PROBE: NOT DETECTED
CA-I BLD-MCNC: 4.5 MG/DL (ref 4.4–5.2)
CALCIUM SERPL-MCNC: 8.9 MG/DL (ref 8.8–10.4)
CALCIUM SERPL-MCNC: 9.4 MG/DL (ref 8.8–10.4)
CHLORIDE SERPL-SCNC: 100 MMOL/L (ref 98–107)
CHLORIDE SERPL-SCNC: 104 MMOL/L (ref 98–107)
COLOR UR AUTO: ABNORMAL
CREAT SERPL-MCNC: 0.84 MG/DL (ref 0.51–0.95)
CREAT SERPL-MCNC: 1.08 MG/DL (ref 0.51–0.95)
CRP SERPL-MCNC: 5.3 MG/L
DIASTOLIC BLOOD PRESSURE - MUSE: 78 MMHG
E CLOAC COMP DNA BLD POS NAA+NON-PROBE: NOT DETECTED
E COLI DNA BLD POS QL NAA+NON-PROBE: NOT DETECTED
E FAECALIS DNA BLD POS QL NAA+NON-PROBE: DETECTED
E FAECIUM DNA BLD POS QL NAA+NON-PROBE: NOT DETECTED
EGFRCR SERPLBLD CKD-EPI 2021: 62 ML/MIN/1.73M2
EGFRCR SERPLBLD CKD-EPI 2021: 84 ML/MIN/1.73M2
ENTEROBACTERALES DNA BLD POS NAA+N-PRB: NOT DETECTED
EOSINOPHIL # BLD AUTO: 0.1 10E3/UL (ref 0–0.7)
EOSINOPHIL NFR BLD AUTO: 1 %
ERYTHROCYTE [DISTWIDTH] IN BLOOD BY AUTOMATED COUNT: 15.3 % (ref 10–15)
ERYTHROCYTE [DISTWIDTH] IN BLOOD BY AUTOMATED COUNT: 15.8 % (ref 10–15)
EST. AVERAGE GLUCOSE BLD GHB EST-MCNC: 117 MG/DL
FLUAV RNA SPEC QL NAA+PROBE: NEGATIVE
FLUBV RNA RESP QL NAA+PROBE: NEGATIVE
GGT SERPL-CCNC: 22 U/L (ref 5–36)
GLUCOSE BLDC GLUCOMTR-MCNC: 106 MG/DL (ref 70–99)
GLUCOSE BLDC GLUCOMTR-MCNC: 57 MG/DL (ref 70–99)
GLUCOSE BLDC GLUCOMTR-MCNC: 96 MG/DL (ref 70–99)
GLUCOSE SERPL-MCNC: 105 MG/DL (ref 70–99)
GLUCOSE SERPL-MCNC: 94 MG/DL (ref 70–99)
GLUCOSE UR STRIP-MCNC: 500 MG/DL
GP B STREP DNA BLD POS QL NAA+NON-PROBE: NOT DETECTED
HAEM INFLU DNA BLD POS QL NAA+NON-PROBE: NOT DETECTED
HBA1C MFR BLD: 5.7 %
HCO3 BLD-SCNC: 23 MMOL/L (ref 21–28)
HCO3 BLDV-SCNC: 22 MMOL/L (ref 21–28)
HCO3 BLDV-SCNC: 22 MMOL/L (ref 21–28)
HCO3 BLDV-SCNC: 26 MMOL/L (ref 21–28)
HCO3 SERPL-SCNC: 18 MMOL/L (ref 22–29)
HCO3 SERPL-SCNC: 25 MMOL/L (ref 22–29)
HCT VFR BLD AUTO: 39.9 % (ref 35–47)
HCT VFR BLD AUTO: 40.9 % (ref 35–47)
HGB BLD-MCNC: 12.5 G/DL (ref 11.7–15.7)
HGB BLD-MCNC: 12.7 G/DL (ref 11.7–15.7)
HGB UR QL STRIP: NEGATIVE
IMM GRANULOCYTES # BLD: 0 10E3/UL
IMM GRANULOCYTES NFR BLD: 0 %
INR PPP: 1.37 (ref 0.85–1.15)
INTERPRETATION ECG - MUSE: NORMAL
K OXYTOCA DNA BLD POS QL NAA+NON-PROBE: NOT DETECTED
KETONES UR STRIP-MCNC: NEGATIVE MG/DL
KLEBSIELLA SP DNA BLD POS QL NAA+NON-PRB: NOT DETECTED
KLEBSIELLA SP DNA BLD POS QL NAA+NON-PRB: NOT DETECTED
L MONOCYTOG DNA BLD POS QL NAA+NON-PROBE: NOT DETECTED
LACTATE SERPL-SCNC: 2.5 MMOL/L (ref 0.7–2)
LACTATE SERPL-SCNC: 4.4 MMOL/L (ref 0.7–2)
LACTATE SERPL-SCNC: 4.6 MMOL/L (ref 0.7–2)
LEUKOCYTE ESTERASE UR QL STRIP: ABNORMAL
LYMPHOCYTES # BLD AUTO: 0.7 10E3/UL (ref 0.8–5.3)
LYMPHOCYTES NFR BLD AUTO: 7 %
MAGNESIUM SERPL-MCNC: 2.2 MG/DL (ref 1.7–2.3)
MCH RBC QN AUTO: 26.6 PG (ref 26.5–33)
MCH RBC QN AUTO: 26.6 PG (ref 26.5–33)
MCHC RBC AUTO-ENTMCNC: 31.1 G/DL (ref 31.5–36.5)
MCHC RBC AUTO-ENTMCNC: 31.3 G/DL (ref 31.5–36.5)
MCV RBC AUTO: 85 FL (ref 78–100)
MCV RBC AUTO: 86 FL (ref 78–100)
MONOCYTES # BLD AUTO: 0.3 10E3/UL (ref 0–1.3)
MONOCYTES NFR BLD AUTO: 3 %
N MEN DNA BLD POS QL NAA+NON-PROBE: NOT DETECTED
NEUTROPHILS # BLD AUTO: 8.1 10E3/UL (ref 1.6–8.3)
NEUTROPHILS NFR BLD AUTO: 88 %
NITRATE UR QL: NEGATIVE
NRBC # BLD AUTO: 0 10E3/UL
NRBC BLD AUTO-RTO: 0 /100
NT-PROBNP SERPL-MCNC: 2511 PG/ML (ref 0–192)
O2/TOTAL GAS SETTING VFR VENT: 40 %
O2/TOTAL GAS SETTING VFR VENT: 50 %
O2/TOTAL GAS SETTING VFR VENT: 60 %
O2/TOTAL GAS SETTING VFR VENT: 80 %
OXYHGB MFR BLDA: 99 % (ref 92–100)
OXYHGB MFR BLDV: 30 % (ref 70–75)
OXYHGB MFR BLDV: 38 % (ref 70–75)
OXYHGB MFR BLDV: 58 % (ref 70–75)
P AERUGINOSA DNA BLD POS NAA+NON-PROBE: NOT DETECTED
P AXIS - MUSE: 70 DEGREES
PCO2 BLD: 33 MM HG (ref 35–45)
PCO2 BLDV: 36 MM HG (ref 40–50)
PCO2 BLDV: 47 MM HG (ref 40–50)
PCO2 BLDV: 49 MM HG (ref 40–50)
PH BLD: 7.45 [PH] (ref 7.35–7.45)
PH BLDV: 7.26 [PH] (ref 7.32–7.43)
PH BLDV: 7.36 [PH] (ref 7.32–7.43)
PH BLDV: 7.39 [PH] (ref 7.32–7.43)
PH UR STRIP: 5.5 [PH] (ref 5–7)
PHOSPHATE SERPL-MCNC: 3.5 MG/DL (ref 2.5–4.5)
PLATELET # BLD AUTO: 213 10E3/UL (ref 150–450)
PLATELET # BLD AUTO: 249 10E3/UL (ref 150–450)
PO2 BLD: 174 MM HG (ref 80–105)
PO2 BLDV: 23 MM HG (ref 25–47)
PO2 BLDV: 25 MM HG (ref 25–47)
PO2 BLDV: 32 MM HG (ref 25–47)
POTASSIUM SERPL-SCNC: 3.8 MMOL/L (ref 3.4–5.3)
POTASSIUM SERPL-SCNC: 3.8 MMOL/L (ref 3.4–5.3)
PR INTERVAL - MUSE: 204 MS
PROCALCITONIN SERPL IA-MCNC: 2.24 NG/ML
PROT SERPL-MCNC: 7 G/DL (ref 6.4–8.3)
PROT SERPL-MCNC: 8.2 G/DL (ref 6.4–8.3)
PROTEUS SP DNA BLD POS QL NAA+NON-PROBE: NOT DETECTED
PROTHROMBIN TIME: 17.1 SECONDS (ref 11.8–14.8)
QRS DURATION - MUSE: 102 MS
QT - MUSE: 334 MS
QTC - MUSE: 452 MS
R AXIS - MUSE: 95 DEGREES
RBC # BLD AUTO: 4.7 10E6/UL (ref 3.8–5.2)
RBC # BLD AUTO: 4.78 10E6/UL (ref 3.8–5.2)
RBC URINE: 3 /HPF
RSV RNA SPEC NAA+PROBE: NEGATIVE
S AUREUS DNA BLD POS QL NAA+NON-PROBE: NOT DETECTED
S AUREUS+CONS DNA BLD POS NAA+NON-PROBE: NOT DETECTED
S EPIDERMIDIS DNA BLD POS QL NAA+NON-PRB: NOT DETECTED
S LUGDUNENSIS DNA BLD POS QL NAA+NON-PRB: NOT DETECTED
S MALTOPHILIA DNA BLD POS QL NAA+NON-PRB: NOT DETECTED
S MARCESCENS DNA BLD POS NAA+NON-PROBE: NOT DETECTED
S PNEUM DNA BLD POS QL NAA+NON-PROBE: NOT DETECTED
S PYO DNA BLD POS QL NAA+NON-PROBE: NOT DETECTED
SALMONELLA DNA BLD POS QL NAA+NON-PROBE: NOT DETECTED
SAO2 % BLDA: 100 % (ref 96–97)
SAO2 % BLDV: 30.5 % (ref 70–75)
SAO2 % BLDV: 38.9 % (ref 70–75)
SAO2 % BLDV: 58.7 % (ref 70–75)
SARS-COV-2 RNA RESP QL NAA+PROBE: NEGATIVE
SODIUM SERPL-SCNC: 139 MMOL/L (ref 135–145)
SODIUM SERPL-SCNC: 140 MMOL/L (ref 135–145)
SP GR UR STRIP: 1.01 (ref 1–1.03)
SQUAMOUS EPITHELIAL: 1 /HPF
STREPTOCOCCUS DNA BLD POS NAA+NON-PROBE: NOT DETECTED
SYSTOLIC BLOOD PRESSURE - MUSE: 122 MMHG
T AXIS - MUSE: 134 DEGREES
TRANSITIONAL EPI: 1 /HPF
TROPONIN T SERPL HS-MCNC: 18 NG/L
TROPONIN T SERPL HS-MCNC: 27 NG/L
UROBILINOGEN UR STRIP-MCNC: NORMAL MG/DL
VANA+VANB ISLT/SPM QL: NOT DETECTED
VENTRICULAR RATE- MUSE: 110 BPM
WBC # BLD AUTO: 17.2 10E3/UL (ref 4–11)
WBC # BLD AUTO: 9.2 10E3/UL (ref 4–11)
WBC CLUMPS #/AREA URNS HPF: PRESENT /HPF
WBC URINE: 115 /HPF

## 2025-07-22 PROCEDURE — 250N000011 HC RX IP 250 OP 636: Performed by: EMERGENCY MEDICINE

## 2025-07-22 PROCEDURE — 250N000009 HC RX 250: Performed by: EMERGENCY MEDICINE

## 2025-07-22 PROCEDURE — 85025 COMPLETE CBC W/AUTO DIFF WBC: CPT | Performed by: EMERGENCY MEDICINE

## 2025-07-22 PROCEDURE — 84100 ASSAY OF PHOSPHORUS: CPT | Performed by: INTERNAL MEDICINE

## 2025-07-22 PROCEDURE — 83605 ASSAY OF LACTIC ACID: CPT | Performed by: EMERGENCY MEDICINE

## 2025-07-22 PROCEDURE — 258N000001 HC RX 258: Performed by: INTERNAL MEDICINE

## 2025-07-22 PROCEDURE — 999N000185 HC STATISTIC TRANSPORT TIME EA 15 MIN

## 2025-07-22 PROCEDURE — 36415 COLL VENOUS BLD VENIPUNCTURE: CPT | Performed by: STUDENT IN AN ORGANIZED HEALTH CARE EDUCATION/TRAINING PROGRAM

## 2025-07-22 PROCEDURE — 85610 PROTHROMBIN TIME: CPT | Performed by: EMERGENCY MEDICINE

## 2025-07-22 PROCEDURE — 82805 BLOOD GASES W/O2 SATURATION: CPT | Performed by: STUDENT IN AN ORGANIZED HEALTH CARE EDUCATION/TRAINING PROGRAM

## 2025-07-22 PROCEDURE — 80048 BASIC METABOLIC PNL TOTAL CA: CPT | Performed by: INTERNAL MEDICINE

## 2025-07-22 PROCEDURE — 82330 ASSAY OF CALCIUM: CPT | Performed by: INTERNAL MEDICINE

## 2025-07-22 PROCEDURE — 272N000278 HC DEVICE 5FR SECURACATH

## 2025-07-22 PROCEDURE — 93005 ELECTROCARDIOGRAM TRACING: CPT | Performed by: EMERGENCY MEDICINE

## 2025-07-22 PROCEDURE — 83605 ASSAY OF LACTIC ACID: CPT | Performed by: INTERNAL MEDICINE

## 2025-07-22 PROCEDURE — 96366 THER/PROPH/DIAG IV INF ADDON: CPT

## 2025-07-22 PROCEDURE — 82805 BLOOD GASES W/O2 SATURATION: CPT | Performed by: INTERNAL MEDICINE

## 2025-07-22 PROCEDURE — 36569 INSJ PICC 5 YR+ W/O IMAGING: CPT

## 2025-07-22 PROCEDURE — 250N000009 HC RX 250: Performed by: INTERNAL MEDICINE

## 2025-07-22 PROCEDURE — 83880 ASSAY OF NATRIURETIC PEPTIDE: CPT | Performed by: EMERGENCY MEDICINE

## 2025-07-22 PROCEDURE — 82248 BILIRUBIN DIRECT: CPT | Performed by: EMERGENCY MEDICINE

## 2025-07-22 PROCEDURE — 87637 SARSCOV2&INF A&B&RSV AMP PRB: CPT | Performed by: EMERGENCY MEDICINE

## 2025-07-22 PROCEDURE — 258N000003 HC RX IP 258 OP 636: Performed by: EMERGENCY MEDICINE

## 2025-07-22 PROCEDURE — 99285 EMERGENCY DEPT VISIT HI MDM: CPT | Mod: 25 | Performed by: EMERGENCY MEDICINE

## 2025-07-22 PROCEDURE — 200N000001 HC R&B ICU

## 2025-07-22 PROCEDURE — 71275 CT ANGIOGRAPHY CHEST: CPT

## 2025-07-22 PROCEDURE — 3E033XZ INTRODUCTION OF VASOPRESSOR INTO PERIPHERAL VEIN, PERCUTANEOUS APPROACH: ICD-10-PCS | Performed by: INTERNAL MEDICINE

## 2025-07-22 PROCEDURE — 87040 BLOOD CULTURE FOR BACTERIA: CPT | Performed by: EMERGENCY MEDICINE

## 2025-07-22 PROCEDURE — 85730 THROMBOPLASTIN TIME PARTIAL: CPT | Performed by: EMERGENCY MEDICINE

## 2025-07-22 PROCEDURE — 99291 CRITICAL CARE FIRST HOUR: CPT | Mod: 25

## 2025-07-22 PROCEDURE — 87154 CUL TYP ID BLD PTHGN 6+ TRGT: CPT | Performed by: EMERGENCY MEDICINE

## 2025-07-22 PROCEDURE — 272N000452 HC KIT SHRLOCK 5FR POWER PICC TRIPLE LUMEN

## 2025-07-22 PROCEDURE — 94761 N-INVAS EAR/PLS OXIMETRY MLT: CPT

## 2025-07-22 PROCEDURE — 36600 WITHDRAWAL OF ARTERIAL BLOOD: CPT

## 2025-07-22 PROCEDURE — 94660 CPAP INITIATION&MGMT: CPT

## 2025-07-22 PROCEDURE — 36415 COLL VENOUS BLD VENIPUNCTURE: CPT | Performed by: EMERGENCY MEDICINE

## 2025-07-22 PROCEDURE — 84484 ASSAY OF TROPONIN QUANT: CPT | Performed by: EMERGENCY MEDICINE

## 2025-07-22 PROCEDURE — 86140 C-REACTIVE PROTEIN: CPT | Performed by: INTERNAL MEDICINE

## 2025-07-22 PROCEDURE — 87086 URINE CULTURE/COLONY COUNT: CPT | Performed by: EMERGENCY MEDICINE

## 2025-07-22 PROCEDURE — 250N000009 HC RX 250

## 2025-07-22 PROCEDURE — 84145 PROCALCITONIN (PCT): CPT | Performed by: INTERNAL MEDICINE

## 2025-07-22 PROCEDURE — 96365 THER/PROPH/DIAG IV INF INIT: CPT

## 2025-07-22 PROCEDURE — 82040 ASSAY OF SERUM ALBUMIN: CPT | Performed by: INTERNAL MEDICINE

## 2025-07-22 PROCEDURE — 85014 HEMATOCRIT: CPT | Performed by: INTERNAL MEDICINE

## 2025-07-22 PROCEDURE — 250N000011 HC RX IP 250 OP 636: Performed by: INTERNAL MEDICINE

## 2025-07-22 PROCEDURE — 5A09457 ASSISTANCE WITH RESPIRATORY VENTILATION, 24-96 CONSECUTIVE HOURS, CONTINUOUS POSITIVE AIRWAY PRESSURE: ICD-10-PCS | Performed by: INTERNAL MEDICINE

## 2025-07-22 PROCEDURE — 250N000013 HC RX MED GY IP 250 OP 250 PS 637: Performed by: EMERGENCY MEDICINE

## 2025-07-22 PROCEDURE — 94640 AIRWAY INHALATION TREATMENT: CPT

## 2025-07-22 PROCEDURE — 999N000157 HC STATISTIC RCP TIME EA 10 MIN

## 2025-07-22 PROCEDURE — 71045 X-RAY EXAM CHEST 1 VIEW: CPT

## 2025-07-22 PROCEDURE — 96367 TX/PROPH/DG ADDL SEQ IV INF: CPT

## 2025-07-22 PROCEDURE — 82977 ASSAY OF GGT: CPT | Performed by: INTERNAL MEDICINE

## 2025-07-22 PROCEDURE — 99291 CRITICAL CARE FIRST HOUR: CPT | Performed by: INTERNAL MEDICINE

## 2025-07-22 PROCEDURE — 83735 ASSAY OF MAGNESIUM: CPT | Performed by: INTERNAL MEDICINE

## 2025-07-22 PROCEDURE — 82805 BLOOD GASES W/O2 SATURATION: CPT | Performed by: EMERGENCY MEDICINE

## 2025-07-22 PROCEDURE — 81001 URINALYSIS AUTO W/SCOPE: CPT | Performed by: EMERGENCY MEDICINE

## 2025-07-22 PROCEDURE — 83036 HEMOGLOBIN GLYCOSYLATED A1C: CPT | Performed by: INTERNAL MEDICINE

## 2025-07-22 RX ORDER — NOREPINEPHRINE BITARTRATE 0.02 MG/ML
INJECTION, SOLUTION INTRAVENOUS
Status: COMPLETED
Start: 2025-07-22 | End: 2025-07-22

## 2025-07-22 RX ORDER — NICOTINE POLACRILEX 4 MG
15-30 LOZENGE BUCCAL
Status: DISCONTINUED | OUTPATIENT
Start: 2025-07-22 | End: 2025-07-22

## 2025-07-22 RX ORDER — DEXTROSE MONOHYDRATE 25 G/50ML
25-50 INJECTION, SOLUTION INTRAVENOUS
Status: DISCONTINUED | OUTPATIENT
Start: 2025-07-22 | End: 2025-07-24

## 2025-07-22 RX ORDER — LIDOCAINE 40 MG/G
CREAM TOPICAL
Status: ACTIVE | OUTPATIENT
Start: 2025-07-22 | End: 2025-07-25

## 2025-07-22 RX ORDER — PIPERACILLIN SODIUM, TAZOBACTAM SODIUM 3; .375 G/15ML; G/15ML
3.38 INJECTION, POWDER, LYOPHILIZED, FOR SOLUTION INTRAVENOUS EVERY 6 HOURS
Status: DISCONTINUED | OUTPATIENT
Start: 2025-07-22 | End: 2025-07-22

## 2025-07-22 RX ORDER — ACETAMINOPHEN 650 MG/1
650 SUPPOSITORY RECTAL ONCE
Status: COMPLETED | OUTPATIENT
Start: 2025-07-22 | End: 2025-07-22

## 2025-07-22 RX ORDER — ESCITALOPRAM OXALATE 10 MG/1
10 TABLET ORAL EVERY MORNING
Status: DISCONTINUED | OUTPATIENT
Start: 2025-07-22 | End: 2025-07-23

## 2025-07-22 RX ORDER — IPRATROPIUM BROMIDE AND ALBUTEROL SULFATE 2.5; .5 MG/3ML; MG/3ML
3 SOLUTION RESPIRATORY (INHALATION) ONCE
Status: COMPLETED | OUTPATIENT
Start: 2025-07-22 | End: 2025-07-22

## 2025-07-22 RX ORDER — ROPIVACAINE IN 0.9% SOD CHL/PF 0.1 %
.01-.2 PLASTIC BAG, INJECTION (ML) EPIDURAL CONTINUOUS
Status: DISCONTINUED | OUTPATIENT
Start: 2025-07-22 | End: 2025-07-22

## 2025-07-22 RX ORDER — SPIRONOLACTONE 25 MG/1
25 TABLET ORAL DAILY
Status: DISCONTINUED | OUTPATIENT
Start: 2025-07-22 | End: 2025-07-25

## 2025-07-22 RX ORDER — NICOTINE POLACRILEX 4 MG
15-30 LOZENGE BUCCAL
Status: DISCONTINUED | OUTPATIENT
Start: 2025-07-22 | End: 2025-07-24

## 2025-07-22 RX ORDER — NOREPINEPHRINE BITARTRATE 0.02 MG/ML
.01-.6 INJECTION, SOLUTION INTRAVENOUS CONTINUOUS
Status: DISCONTINUED | OUTPATIENT
Start: 2025-07-22 | End: 2025-07-24

## 2025-07-22 RX ORDER — DEXTROSE MONOHYDRATE 25 G/50ML
25-50 INJECTION, SOLUTION INTRAVENOUS
Status: DISCONTINUED | OUTPATIENT
Start: 2025-07-22 | End: 2025-07-22

## 2025-07-22 RX ORDER — BUMETANIDE 2 MG/1
4 TABLET ORAL
Status: DISCONTINUED | OUTPATIENT
Start: 2025-07-22 | End: 2025-07-24

## 2025-07-22 RX ORDER — ACETAMINOPHEN 325 MG/1
325 TABLET ORAL EVERY 4 HOURS PRN
Status: DISCONTINUED | OUTPATIENT
Start: 2025-07-22 | End: 2025-07-23

## 2025-07-22 RX ORDER — PIPERACILLIN SODIUM, TAZOBACTAM SODIUM 4; .5 G/20ML; G/20ML
4.5 INJECTION, POWDER, LYOPHILIZED, FOR SOLUTION INTRAVENOUS ONCE
Status: COMPLETED | OUTPATIENT
Start: 2025-07-22 | End: 2025-07-22

## 2025-07-22 RX ORDER — VANCOMYCIN HYDROCHLORIDE 1 G/200ML
1000 INJECTION, SOLUTION INTRAVENOUS EVERY 12 HOURS
Status: DISCONTINUED | OUTPATIENT
Start: 2025-07-23 | End: 2025-07-24

## 2025-07-22 RX ORDER — PIPERACILLIN SODIUM, TAZOBACTAM SODIUM 4; .5 G/20ML; G/20ML
4.5 INJECTION, POWDER, LYOPHILIZED, FOR SOLUTION INTRAVENOUS EVERY 6 HOURS
Status: DISCONTINUED | OUTPATIENT
Start: 2025-07-22 | End: 2025-07-24

## 2025-07-22 RX ORDER — CEFAZOLIN SODIUM 1 G/50ML
2500 SOLUTION INTRAVENOUS ONCE
Status: COMPLETED | OUTPATIENT
Start: 2025-07-22 | End: 2025-07-22

## 2025-07-22 RX ORDER — IOPAMIDOL 755 MG/ML
90 INJECTION, SOLUTION INTRAVASCULAR ONCE
Status: COMPLETED | OUTPATIENT
Start: 2025-07-22 | End: 2025-07-22

## 2025-07-22 RX ADMIN — PIPERACILLIN AND TAZOBACTAM 4.5 G: 4; .5 INJECTION, POWDER, FOR SOLUTION INTRAVENOUS at 10:29

## 2025-07-22 RX ADMIN — LIDOCAINE HYDROCHLORIDE 1 ML: 10 INJECTION, SOLUTION EPIDURAL; INFILTRATION; INTRACAUDAL; PERINEURAL at 14:40

## 2025-07-22 RX ADMIN — ACETAMINOPHEN 650 MG: 650 SUPPOSITORY RECTAL at 13:03

## 2025-07-22 RX ADMIN — IPRATROPIUM BROMIDE AND ALBUTEROL SULFATE 3 ML: .5; 3 SOLUTION RESPIRATORY (INHALATION) at 10:34

## 2025-07-22 RX ADMIN — PIPERACILLIN AND TAZOBACTAM 4.5 G: 4; .5 INJECTION, POWDER, FOR SOLUTION INTRAVENOUS at 22:34

## 2025-07-22 RX ADMIN — NOREPINEPHRINE BITARTRATE 0.03 MCG/KG/MIN: 0.02 INJECTION, SOLUTION INTRAVENOUS at 14:26

## 2025-07-22 RX ADMIN — SODIUM CHLORIDE 500 ML: 0.9 INJECTION, SOLUTION INTRAVENOUS at 12:54

## 2025-07-22 RX ADMIN — PIPERACILLIN AND TAZOBACTAM 4.5 G: 4; .5 INJECTION, POWDER, FOR SOLUTION INTRAVENOUS at 15:51

## 2025-07-22 RX ADMIN — PANTOPRAZOLE SODIUM 40 MG: 40 INJECTION, POWDER, FOR SOLUTION INTRAVENOUS at 17:24

## 2025-07-22 RX ADMIN — Medication 0.03 MCG/KG/MIN: at 14:26

## 2025-07-22 RX ADMIN — SODIUM CHLORIDE 1000 ML: 0.9 INJECTION, SOLUTION INTRAVENOUS at 10:21

## 2025-07-22 RX ADMIN — DEXTROSE MONOHYDRATE 50 ML: 25 INJECTION, SOLUTION INTRAVENOUS at 20:30

## 2025-07-22 RX ADMIN — SODIUM CHLORIDE 2500 MG: 0.9 INJECTION, SOLUTION INTRAVENOUS at 11:01

## 2025-07-22 RX ADMIN — IOPAMIDOL 90 ML: 755 INJECTION, SOLUTION INTRAVENOUS at 13:51

## 2025-07-22 ASSESSMENT — ACTIVITIES OF DAILY LIVING (ADL)
ADLS_ACUITY_SCORE: 60
ADLS_ACUITY_SCORE: 48
ADLS_ACUITY_SCORE: 62
ADLS_ACUITY_SCORE: 62
NUMBER_OF_TIMES_PATIENT_HAS_FALLEN_WITHIN_LAST_SIX_MONTHS: 1
DRESSING/BATHING_DIFFICULTY: NO
ADLS_ACUITY_SCORE: 62
ADLS_ACUITY_SCORE: 59
WEAR_GLASSES_OR_BLIND: OTHER (SEE COMMENTS)
ADLS_ACUITY_SCORE: 59
HEARING_DIFFICULTY_OR_DEAF: NO
CHANGE_IN_FUNCTIONAL_STATUS_SINCE_ONSET_OF_CURRENT_ILLNESS/INJURY: YES
ADLS_ACUITY_SCORE: 59
ADLS_ACUITY_SCORE: 59
DOING_ERRANDS_INDEPENDENTLY_DIFFICULTY: YES
ADLS_ACUITY_SCORE: 62
ADLS_ACUITY_SCORE: 59
CONCENTRATING,_REMEMBERING_OR_MAKING_DECISIONS_DIFFICULTY: NO
DIFFICULTY_COMMUNICATING: NO
ADLS_ACUITY_SCORE: 62
TOILETING_ISSUES: NO
FALL_HISTORY_WITHIN_LAST_SIX_MONTHS: YES
DIFFICULTY_EATING/SWALLOWING: NO
WALKING_OR_CLIMBING_STAIRS_DIFFICULTY: YES
WALKING_OR_CLIMBING_STAIRS: OTHER (SEE COMMENTS)
ADLS_ACUITY_SCORE: 62
ADLS_ACUITY_SCORE: 50

## 2025-07-22 NOTE — PHARMACY-ADMISSION MEDICATION HISTORY
Pharmacist Admission Medication History    Admission medication history is complete. The information provided in this note is only as accurate as the sources available at the time of the update.    Information Source(s): Patient and CareEverywhere/SureScripts via in-person    Pertinent Information: Patient on BiPAP and answered questions with yes/no head movements.    Changes made to PTA medication list:  Added: None  Deleted: senokot  Changed: None    Allergies reviewed with patient and updates made in EHR: yes    Medication History Completed By: Cyndie Das 7/22/2025 11:55 AM    PTA Med List   Medication Sig Last Dose/Taking    acetaminophen (TYLENOL) 650 MG CR tablet Take 1,300 mg by mouth every 8 hours as needed for mild pain or fever. Unknown    apixaban ANTICOAGULANT (ELIQUIS) 5 MG tablet Take 1 tablet (5 mg) by mouth 2 times daily. 7/21/2025 Evening    aspirin 81 MG EC tablet Take 81 mg by mouth every morning. 7/21/2025 Morning    bumetanide (BUMEX) 2 MG tablet Take 4 mg by mouth 2 times daily. 7/21/2025 Evening    empagliflozin (JARDIANCE) 10 MG TABS tablet Take 10 mg by mouth daily. 7/21/2025    escitalopram (LEXAPRO) 10 MG tablet Take 10 mg by mouth every morning. 7/21/2025    miconazole (MICATIN) 2 % external powder Apply topically 2 times daily as needed for itching or other. Unknown    potassium chloride sheila ER (KLOR-CON M10) 10 MEQ CR tablet Take 30 mEq by mouth every morning. 7/21/2025 Morning    simvastatin (ZOCOR) 10 MG tablet Take 1 tablet (10 mg) by mouth at bedtime. 7/21/2025 Evening    spironolactone (ALDACTONE) 25 MG tablet Take 1 tablet (25 mg) by mouth daily. 7/21/2025 Morning

## 2025-07-22 NOTE — PROGRESS NOTES
Temperature recheck was 102.8 Axillary. Patient warm to touch. MD Guillen aware. Given rectal tylenol.

## 2025-07-22 NOTE — PHARMACY-VANCOMYCIN DOSING SERVICE
Pharmacy Vancomycin Initial Note  Date of Service 2025  Patient's  1975  49 year old, female    Indication: Sepsis    Current estimated CrCl = Estimated Creatinine Clearance: 110.7 mL/min (based on SCr of 0.84 mg/dL).    Creatinine for last 3 days  2025: 10:01 AM Creatinine 0.84 mg/dL    Recent Vancomycin Level(s) for last 3 days  No results found for requested labs within last 3 days.      Vancomycin IV Administrations (past 72 hours)                     vancomycin (VANCOCIN) 2,500 mg in sodium chloride 0.9 % 500 mL intermittent infusion (mg) 2,500 mg New Bag 25 1101                    Nephrotoxins and other renal medications (From now, onward)      Start     Dose/Rate Route Frequency Ordered Stop    25 1800  bumetanide (BUMEX) tablet 4 mg        Note to Pharmacy: PTA Sig:Take 4 mg by mouth 2 times daily.      4 mg Oral 2 TIMES DAILY (Diuretics and Nitrates) 25 1443      25 1630  piperacillin-tazobactam (ZOSYN) 4.5 g vial to attach to  mL bag         4.5 g  over 30 Minutes Intravenous EVERY 6 HOURS 25 1438      25 1430  norepinephrine (LEVOPHED) 4 mg in  mL infusion PREMIX         0.01-0.6 mcg/kg/min × 131.5 kg  4.9-295.9 mL/hr  Intravenous CONTINUOUS 25 1415      25 1430  norepinephrine (LEVOPHED) 4 mg in  mL PERIPHERAL infusion         0.01-0.2 mcg/kg/min × 131.5 kg  4.9-98.6 mL/hr  Intravenous CONTINUOUS 25 1415              Contrast Orders - past 72 hours (72h ago, onward)      Start     Dose/Rate Route Frequency Stop    25 1400  iopamidol (ISOVUE-370) solution 90 mL         90 mL Intravenous ONCE 25 1351            InsightRX Prediction of Planned Initial Vancomycin Regimen  Loading dose: 2500 mg in ED  Regimen: 1000 mg IV every 12 hours.  Start time: 06:00 on 2025  Exposure target: AUC24 (range) 400-600 mg/L.hr   AUC24,ss: 484 mg/L.hr  Probability of AUC24 > 400: 64 %  Ctrough,ss: 13.8  mg/L  Probability of Ctrough,ss > 20: 31 %  Probability of nephrotoxicity (Lodise ARMANDO 2009): 9 %            Plan:  Start vancomycin 1000 mg IV q12h.   Vancomycin monitoring method: AUC  Vancomycin therapeutic monitoring goal: 400-600 mg*h/L  Pharmacy will check vancomycin levels as appropriate in 1-3 Days.    Serum creatinine levels will be ordered daily for the first week of therapy and at least twice weekly for subsequent weeks.      Audrey Mcconnell RPH

## 2025-07-22 NOTE — PROGRESS NOTES
Patient placed on BiPAP due to increased work of breathing. DuoNeb x 1 and ABG drawn. RT will continue to follow.     Perry Harris, RT

## 2025-07-22 NOTE — PROCEDURES
"PICC Line Insertion Procedure Note     Pt. Name:   Bess Enamorado     MRN:          [id number]     Procedure: Insertion of a  Triple Lumen  5 fr  Bard SOLO (valved) Power PICC, Lot number [id number]     Indications: resp failure and sepsis     Contraindications : none     Procedure Details:     Patient identified with 2 identifiers and \"Time Out\" conducted.     Central line insertion bundle followed: hand hygiene performed prior to procedure, site cleansed with Cholraprep (CHG), hat, mask, sterile gloves, sterile gown worn, patient draped with maximum barrier head to toe drape, sterile field maintained.     The vein was assessed and found to be compressible and of adequate size.      Lidocaine 1% one ml administered SQ to the insertion site.      Modified Seldinger Technique (MST) used for insertion, one attempt(s) required to access vein.      A 5 Fr PICC was inserted into the basilic vein of the left upper arm.        Catheter threaded without difficulty. Good blood return noted.     Catheter was flushed with 10 cc normal saline.      Catheter secured with SecurAcath, Biopatch, and Tegaderm dressing applied.   +++   This patient's catheter is secured with SecurAcath instead of a traditional suture or adhesive based securement. This is a subcutaneous anchor securement system (aka TERRY or SecurAcath) that holds the catheter just below the skin with nitinol feet and a friction fit  around the catheter.  It can stay with the catheter until the catheter is removed.    Do not open, change or remove the SecurAcath.  SecurAcath may be disinfected during a dressing change, but do not open or remove it.  SecurAcath acts like a hinge - lift, clean 360 degrees around, let dry and apply new dressing.  SecurAcath is compatible with all dressings and antiseptic agents.  Ensure the wings of the catheter and SecurAcath are completely under the boarder of the dressing.  SecurAcath is MRI safe to 3T, see IFU for details.  A Statlock " "is not necessary when SecurAcath is present.   Patients can go to MRI with SecurAcath device in place  When the catheter is indicated for removal, enter \"Nursing to Consult for Vascular Access Care\" order in EPIC, watch the removal video on SharePoint, or call for training if you have not removed before. 24 hour Critical access hospital Clinical Education and Support  [phone]    +++    The sharps that are included in the PICC insertion kit were accounted for and disposed of in the sharps container prior to breakdown of the sterile field.     CLABSI prevention brochure left at bedside.     Patient tolerated procedure well.      Patient's primary RN notified PICC is ready for use.       Findings:     Total catheter length  49 cm, with 2 cm exposed. Mid upper arm circumference is 43 cm.      Tip placement verified in the SVC/RA junction by [employer]:         Comments: The PICC is properly positioned and ready for use.          Kristy Salinas, RN BSN  Vascular Access - [facility]   "

## 2025-07-22 NOTE — ED PROVIDER NOTES
EMERGENCY DEPARTMENT ENCOUNTER      NAME: Bess Enamorado  AGE: 49 year old female  YOB: 1975  MRN: [id number]  EVALUATION DATE & TIME: 2025  9:47 AM    PCP: Mikala Luna    ED PROVIDER: Ravin Guillen D.O.      Chief Complaint   Patient presents with    Respiratory Distress    Fever       FINAL IMPRESSION:  1. Acute respiratory failure with hypoxia (H)    2. Septic shock (H)        ED COURSE & MEDICAL DECISION MAKIN:44 AM I met with the patient to gather history and to perform my initial exam. I discussed the plan for care while in the Emergency Department.  10:18 AM I rechecked wit and updated the patient on results.   12:42 PM I spoke to Dr. Brian Ortega MD, Cardiology. Recommend just trend troponins at this time. No trending action to do with her troponin change.  12:55 PM I spoke to Dr. Fabian Miller MD, Intensivist who accepts the patient for admission to the ICU.          Pertinent Labs & Imaging studies reviewed. (See chart for details)  49 year old female presents to the Emergency Department for evaluation of acute onset of respiratory distress.  Patient was noted to be febrile when she first arrived.  Has had multiple episodes of sepsis with urinary tract infections and cellulitis in the past.  Today she was in significant respiratory distress and hypoxic when she first arrived, requiring us to start BiPAP upon arrival.  With the BiPAP, her respiratory status has improved, and she did not require intubation.  My initial concern was for pulmonary infection, COVID, influenza, RSV, or other acute process.  Additionally it is potential that the patient could have a PE, with more likely representation of being pneumonia.  Chest x-ray was inconclusive, and I do believe CT imaging of the chest would be prudent for this patient to evaluate for both PE and pneumonia as she has high risk for both.  Lab testing did show a significant elevation in her lactate at 4.4, but she was also  noted to be fluid overloaded with an elevated BNP on her labs.  Therefore we could not give the full 30 cc/kg bolus of fluids, but have been gently rehydrating her with normal saline.  Her blood pressures have been somewhat soft in the emergency department, that is why we have ordered a second 500 cc of fluids, and if this does not resolve her hypotension, she would likely require vasopressors.  A PICC line has also been ordered.  She did have a mild increase in her troponin when compared the first 1 to the second, however cardiology did not believe this represented a likely NSTEMI, and recommended trending instead of treating.  I discussed the patient with the intensivist, and at this time the plan is to admit to the ICU.  At the time of admission, CT imaging was still pending.    Medical Decision Making  Care impacted by Chronic Kidney Disease and Heart Disease  Admit.    MIPS (CTPE, Dental pain, Brar, Sinusitis, Asthma/COPD, Head Trauma): Not Applicable    SEPSIS: The patient has signs of sepsis   Sepsis ED evaluation   The patient has signs of sepsis as evidenced by:  1. Presence of 2 SIRS criteria, suspected infection, AND  2. Organ dysfunction: Lactic Acidosis with value >2.0 due to sepsis    Sepsis Care Initiation: Starting at 10:05 AM on 07/22/25, until specified. Prior to this documentation, sepsis, severe sepsis, or septic shock was NOT thought to be a significant cause of illness. This order represents the first time infection was seriously considered to be affecting the patient.    Lactic Acid Results:  Recent Labs   Lab Test 07/22/25  1203 07/22/25  1001 05/10/25  2350   LACT 2.5* 4.4* 1.3       3 Hour Bundle 6 Hour Bundle (Reassessment)   Blood Cultures before IV Antibiotics: Yes  Antibiotics given: see below  Prehospital fluid volume (mL):                     Total fluids given (ED +Pre-hospital):  Full 30 mL/kg bolus intentionally NOT administered to this patient due to CHF and acute Pulmonary  Edema. The target volume to infuse in this patient is 500.   Repeat Lactic Acid Level: Ordered by reflex for 2 hours after initial lactic acid collection.  Vasopressors: MAP>65 after initial IVF bolus, will continue to monitor fluid status and vital signs.  Repeat perfusion exam: I attest to having performed a repeat sepsis exam and assessment of perfusion at 12:18 PM .   BMI Readings from Last 1 Encounters:   07/22/25 49.78 kg/m        Anti-infectives (From admission through now)      Start     Dose/Rate Route Frequency Ordered Stop    07/22/25 1030  piperacillin-tazobactam (ZOSYN) 4.5 g vial to attach to  mL bag         4.5 g  over 30 Minutes Intravenous ONCE 07/22/25 1015 07/22/25 1102    07/22/25 1030  vancomycin (VANCOCIN) 2,500 mg in sodium chloride 0.9 % 500 mL intermittent infusion         2,500 mg  over 2 Hours Intravenous ONCE 07/22/25 1021                        At the conclusion of the encounter I discussed the results of all of the tests and the disposition. The questions were answered. The patient or family acknowledged understanding and was agreeable with the care plan.      CRITICAL CARE:  Critical Care  Performed by: JULIA ORO  Authorized by: JULIA ORO  Total critical care time: 50 minutes  Critical care time was exclusive of separately billable procedures and treating other patients.  Critical care was necessary to treat or prevent imminent or life-threatening deterioration of the following conditions: acute respiratory failure with hypoxia, septic shock  Critical care was time spent personally by me on the following activities: development of treatment plan with patient or surrogate, discussions with consultants, examination of patient, evaluation of patient's response to treatment, obtaining history from patient or surrogate, ordering and performing treatments and interventions, ordering and review of laboratory studies, ordering and review of radiographic studies and  re-evaluation of patient's condition, this excludes any separately billable procedures.        HPI    Patient information was obtained from: EMS and patient.    Use of : N/A        Bess Enamorado is a 49 year old female with a pertinent medical history of CHF, PE, respiratory failure, anxiety, hypertension, and class 3 severe obesity who presents to this ED via EMS for evaluation of shortness of breath.    Per EMS,   Patient is coming from home complaining of fever, chills, and shortness of breath. EMS found heart rate to be in 30s-60s and gave patient 1 duoneb and 400 mL fluids.   The patient reports of having shortness of breath since this morning. She also reports having some nausea and bilateral leg pain. She denies any vomiting, dysuria, or hematuria. There were no other concerns/complaints at this time.         PAST MEDICAL HISTORY:  Past Medical History:   Diagnosis Date    Acute on chronic diastolic congestive heart failure (H) 02/09/2022    Arthritis 2019    Hips    ASCUS favor benign 08/2015    Neg HPV    Deep vein thrombosis (DVT) of left lower extremity (H) 03/25/2025    Depressive disorder 2010    H/O colposcopy with cervical biopsy 09/14/2015    Bx & ECC - negative    Hypertension 2019    LSIL on Pap smear 07/2014    Neg high risk HPV    Multiple sclerosis (H) 08/29/2005 9/18/200pt dx with MS 2004--dx by neurolgist and is followed by Dr. Harris    Myocardial infarction (H)     Obese     Pneumonia due to infectious organism, unspecified laterality, unspecified part of lung 02/08/2022    Sepsis (Temp 101, , WBC 13.0) 10/23/2018    Sepsis, due to unspecified organism, unspecified whether acute organ dysfunction present (H) 03/16/2024    Severe sepsis (H) 05/08/2025    Shingles 10/2016    SIRS (systemic inflammatory response syndrome) (H) 03/04/2021    Sleep apnea     UNSPEC CONSTIPATION 09/18/2006 9/18/2006   Has tried otc suppository and otc lax.        PAST SURGICAL HISTORY:  Past  Surgical History:   Procedure Laterality Date    CHOLECYSTECTOMY, LAPOROSCOPIC      Cholecystectomy, Laparoscopic    COLONOSCOPY  2007?    Bathroom issue..results normal    COMBINED CYSTOSCOPY, RETROGRADES, EXCHANGE STENT URETER(S) Right 2/21/2022    Procedure: CYSTOSCOPY, WITH right RETROGRADE PYELOGRAM AND right URETERAL STENT PLACEMENT;  Surgeon: Doyle Palomares MD;  Location: [facility] OR    OPEN REDUCTION INTERNAL FIXATION TOE(S)  4/13/2012    Procedure:OPEN REDUCTION INTERNAL FIXATION TOE(S); Open reduction internal fixation right proximal fifth metatarsal fracture-   Anes-choice block; Surgeon:LEY, JEFFREY DUANE; Location:[facility] OR    PICC SINGLE LUMEN PLACEMENT  3/20/2024    PICC TRIPLE LUMEN PLACEMENT  2/21/2022              CURRENT MEDICATIONS:    Current Facility-Administered Medications   Medication Dose Route Frequency Provider Last Rate Last Admin    acetaminophen (TYLENOL) Suppository 650 mg  650 mg Rectal Once Ravin Guillen, DO        lidocaine (LMX4) cream   Topical Q1H PRN Ravin Guillen DO        lidocaine 1 % 0.1-5 mL  0.1-5 mL Other Q1H PRN Ravin Guillen DO        sodium chloride (PF) 0.9% PF flush 10-40 mL  10-40 mL Intracatheter Once PRN Ravin Guillen DO        sodium chloride (PF) 0.9% PF flush 3 mL  3 mL Intracatheter q1 min prn Ravin Guillen DO        sodium chloride (PF) 0.9% PF flush 3 mL  3 mL Intracatheter Q8H Ravin Guillen DO        vancomycin (VANCOCIN) 2,500 mg in sodium chloride 0.9 % 500 mL intermittent infusion  2,500 mg Intravenous Once Ravin Guillen, DO   2,500 mg at 07/22/25 1101     Current Outpatient Medications   Medication Sig Dispense Refill    acetaminophen (TYLENOL) 650 MG CR tablet Take 1,300 mg by mouth every 8 hours as needed for mild pain or fever.      apixaban ANTICOAGULANT (ELIQUIS) 5 MG tablet Take 1 tablet (5 mg) by mouth 2 times daily. 90 tablet 3    aspirin 81 MG EC tablet Take 81 mg by mouth every morning.       bumetanide (BUMEX) 2 MG tablet Take 4 mg by mouth 2 times daily.      empagliflozin (JARDIANCE) 10 MG TABS tablet Take 10 mg by mouth daily.      escitalopram (LEXAPRO) 10 MG tablet Take 10 mg by mouth every morning.      miconazole (MICATIN) 2 % external powder Apply topically 2 times daily as needed for itching or other.      potassium chloride sheila ER (KLOR-CON M10) 10 MEQ CR tablet Take 30 mEq by mouth every morning.      simvastatin (ZOCOR) 10 MG tablet Take 1 tablet (10 mg) by mouth at bedtime. 90 tablet 3    spironolactone (ALDACTONE) 25 MG tablet Take 1 tablet (25 mg) by mouth daily. 90 tablet 3         ALLERGIES:  No Known Allergies    FAMILY HISTORY:  Family History   Problem Relation Age of Onset    Neurologic Disorder Father         MS    Heart Disease Father         irregular heart rate    Diabetes Father     Melanoma Father     Sleep Apnea Father     Other Cancer Father     Cancer Maternal Grandfather         lung    Other Cancer Maternal Grandfather     Cancer Paternal Grandmother         stomach    Other Cancer Paternal Grandmother     Cancer Mother     Other Cancer Mother     Thyroid Disease Mother     Gynecology Maternal Aunt         PCOS    Hypertension Maternal Grandmother     Anxiety Disorder Maternal Grandmother     Thyroid Disease Maternal Grandmother     Anxiety Disorder Sister        SOCIAL HISTORY:  Social History     Socioeconomic History    Marital status: Single   Occupational History     Employer: [email or web address]   Tobacco Use    Smoking status: Never    Smokeless tobacco: Never   Vaping Use    Vaping status: Never Used   Substance and Sexual Activity    Alcohol use: Yes     Comment: social    Drug use: No    Sexual activity: Not Currently     Partners: Male     Birth control/protection: Pill   Other Topics Concern    Parent/sibling w/ CABG, MI or angioplasty before 65F 55M? No   Social History Narrative    , 3rd-5th grade     Social Drivers of Health      Financial Resource Strain: Low Risk  (5/8/2025)    Financial Resource Strain     Within the past 12 months, have you or your family members you live with been unable to get utilities (heat, electricity) when it was really needed?: No   Food Insecurity: Low Risk  (5/8/2025)    Food Insecurity     Within the past 12 months, did you worry that your food would run out before you got money to buy more?: No     Within the past 12 months, did the food you bought just not last and you didn t have money to get more?: No   Recent Concern: Food Insecurity - High Risk (3/23/2025)    Food Insecurity     Within the past 12 months, did you worry that your food would run out before you got money to buy more?: Yes     Within the past 12 months, did the food you bought just not last and you didn t have money to get more?: No   Transportation Needs: High Risk (5/8/2025)    Transportation Needs     Within the past 12 months, has lack of transportation kept you from medical appointments, getting your medicines, non-medical meetings or appointments, work, or from getting things that you need?: Yes   Physical Activity: Inactive (3/21/2024)    Exercise Vital Sign     Days of Exercise per Week: 0 days     Minutes of Exercise per Session: 0 min   Stress: No Stress Concern Present (12/4/2020)    [employer] [place] [employer] - Occupational Stress Questionnaire     Feeling of Stress : Only a little   Social Connections: Unknown (3/1/2022)    Social Connection and Isolation Panel [NHANES]     Frequency of Communication with Friends and Family: Twice a week     Frequency of Social Gatherings with Friends and Family: Twice a week   Interpersonal Safety: Low Risk  (5/8/2025)    Interpersonal Safety     Do you feel physically and emotionally safe where you currently live?: Yes     Within the past 12 months, have you been hit, slapped, kicked or otherwise physically hurt by someone?: No     Within the past 12 months, have you been  humiliated or emotionally abused in other ways by your partner or ex-partner?: No   Recent Concern: Interpersonal Safety - High Risk (3/23/2025)    Interpersonal Safety     Do you feel physically and emotionally safe where you currently live?: No     Within the past 12 months, have you been hit, slapped, kicked or otherwise physically hurt by someone?: No     Within the past 12 months, have you been humiliated or emotionally abused in other ways by your partner or ex-partner?: No   Housing Stability: High Risk (5/8/2025)    Housing Stability     Do you have housing? : No     Are you worried about losing your housing?: No       VITALS:  Patient Vitals for the past 24 hrs:   BP Temp Temp src Pulse Resp SpO2 Weight   07/22/25 1255 -- (!) 102.8  F (39.3  C) Axillary -- -- -- --   07/22/25 1235 92/53 -- -- 109 25 100 % --   07/22/25 1220 100/56 -- -- 110 27 100 % --   07/22/25 1200 96/47 -- -- 109 27 100 % --   07/22/25 1156 -- -- -- 109 22 100 % --   07/22/25 1141 104/54 -- -- 118 28 100 % --   07/22/25 1138 -- -- -- 116 23 99 % --   07/22/25 1126 121/57 -- -- 120 26 100 % --   07/22/25 1123 135/56 -- -- (!) 121 26 100 % --   07/22/25 1108 -- -- -- (!) 126 (!) 32 (!) 78 % --   07/22/25 1050 116/54 -- -- 120 26 96 % --   07/22/25 1036 132/57 -- -- 108 26 100 % --   07/22/25 1030 -- -- -- 109 25 100 % --   07/22/25 0951 -- -- -- (!) 121 (!) 35 (!) 86 % 131.5 kg (290 lb)   07/22/25 0948 109/77 (!) 101.7  F (38.7  C) Oral -- -- -- --       PHYSICAL EXAM    VITAL SIGNS: BP 92/53   Pulse 109   Temp (!) 102.8  F (39.3  C) (Axillary)   Resp 25   Wt 131.5 kg (290 lb)   SpO2 100%   BMI 49.78 kg/m      General Appearance: Well-appearing, well-nourished, no acute distress   Head:  Normocephalic, without obvious abnormality, atraumatic  Eyes:  PERRL, conjunctiva/corneas clear, EOM's intact,  ENT:  Lips, mucosa, and tongue normal, membranes are moist without pallor  Neck:  Normal ROM, symmetrical, trachea midline    Chest:  No  tenderness or deformity, no crepitus  Cardio:  Tachycardic, no murmur, rub or gallop, 2+ pulses symmetric in all extremities. Chronic venostasis with 4+ pitting edema bilaterally.   Pulm:  Diminished breath sounds with increased work and rate of breathing.  Back:  ROM normal, no CVA tenderness, no spinal tenderness, no paraspinal tenderness  Abdomen:  Soft, non-tender, no rebound or guarding.  Musculoskeletal: Full ROM, no edema, no cyanosis, good ROM of major joints  Integument:  Warm, Dry, No erythema, No rash.    Neurologic:  Alert & oriented.  No focal deficits appreciated.    Psychiatric:  Affect normal, Judgment normal, Mood normal.      LABS  Recent Results (from the past 24 hours)   CBC with platelets differential    Narrative    The following orders were created for panel order CBC with platelets differential.  Procedure                               Abnormality         Status                     ---------                               -----------         ------                     CBC with platelets and ...[[id number]]  Abnormal            Final result                 Please view results for these tests on the individual orders.   Comprehensive metabolic panel   Result Value Ref Range    Sodium 140 135 - 145 mmol/L    Potassium 3.8 3.4 - 5.3 mmol/L    Carbon Dioxide (CO2) 25 22 - 29 mmol/L    Anion Gap 15 7 - 15 mmol/L    Urea Nitrogen 21.7 (H) 6.0 - 20.0 mg/dL    Creatinine 0.84 0.51 - 0.95 mg/dL    GFR Estimate 84 >60 mL/min/1.73m2    Calcium 9.4 8.8 - 10.4 mg/dL    Chloride 100 98 - 107 mmol/L    Glucose 94 70 - 99 mg/dL    Alkaline Phosphatase 88 40 - 150 U/L    AST 17 0 - 45 U/L    ALT 7 0 - 50 U/L    Protein Total 8.2 6.4 - 8.3 g/dL    Albumin 4.0 3.5 - 5.2 g/dL    Bilirubin Total 2.6 (H) <=1.2 mg/dL   Lactic Acid Whole Blood with 1X Repeat in 2 HR when >2   Result Value Ref Range    Lactic Acid, Initial 4.4 (HH) 0.7 - 2.0 mmol/L   INR   Result Value Ref Range    INR 1.37 (H) 0.85 - 1.15    PT 17.1  (H) 11.8 - 14.8 Seconds   Partial thromboplastin time   Result Value Ref Range    aPTT 35 22 - 38 Seconds   NT-proBNP   Result Value Ref Range    NT-proBNP 2,511 (H) 0 - 192 pg/mL   Troponin T, High Sensitivity   Result Value Ref Range    Troponin T, High Sensitivity 18 (H) <=14 ng/L   CBC with platelets and differential   Result Value Ref Range    WBC Count 9.2 4.0 - 11.0 10e3/uL    RBC Count 4.70 3.80 - 5.20 10e6/uL    Hemoglobin 12.5 11.7 - 15.7 g/dL    Hematocrit 39.9 35.0 - 47.0 %    MCV 85 78 - 100 fL    MCH 26.6 26.5 - 33.0 pg    MCHC 31.3 (L) 31.5 - 36.5 g/dL    RDW 15.3 (H) 10.0 - 15.0 %    Platelet Count 249 150 - 450 10e3/uL    % Neutrophils 88 %    % Lymphocytes 7 %    % Monocytes 3 %    % Eosinophils 1 %    % Basophils 0 %    % Immature Granulocytes 0 %    NRBCs per 100 WBC 0 <1 /100    Absolute Neutrophils 8.1 1.6 - 8.3 10e3/uL    Absolute Lymphocytes 0.7 (L) 0.8 - 5.3 10e3/uL    Absolute Monocytes 0.3 0.0 - 1.3 10e3/uL    Absolute Eosinophils 0.1 0.0 - 0.7 10e3/uL    Absolute Basophils 0.0 0.0 - 0.2 10e3/uL    Absolute Immature Granulocytes 0.0 <=0.4 10e3/uL    Absolute NRBCs 0.0 10e3/uL   Blood gas venous   Result Value Ref Range    pH Venous 7.36 7.32 - 7.43    pCO2 Venous 47 40 - 50 mm Hg    pO2 Venous 25 25 - 47 mm Hg    Bicarbonate Venous 26 21 - 28 mmol/L    Base Excess/Deficit Venous 0.2 -3.0 - 3.0 mmol/L    FIO2 80     Oxyhemoglobin Venous 38 (L) 70 - 75 %    O2 Sat, Venous 38.9 (L) 70.0 - 75.0 %    Narrative    In healthy individuals, oxyhemoglobin (O2Hb) and oxygen saturation (SO2) are approximately equal. In the presence of dyshemoglobins, oxyhemoglobin can be considerably lower than oxygen saturation.   Blood gas arterial   Result Value Ref Range    pH Arterial 7.45 7.35 - 7.45    pCO2 Arterial 33 (L) 35 - 45 mm Hg    pO2 Arterial 174 (H) 80 - 105 mm Hg    FIO2 50     Bicarbonate Arterial 23 21 - 28 mmol/L    Base Excess/Deficit Arterial -0.7 -3.0 - 3.0 mmol/L    Oxyhemoglobin Arterial 99  92 - 100 %    O2 Sat, Arterial 100.0 (H) 96.0 - 97.0 %    Narrative    In healthy individuals, oxyhemoglobin (O2Hb) and oxygen saturation (SO2) are approximately equal. In the presence of dyshemoglobins, oxyhemoglobin can be considerably lower than oxygen saturation.   XR Chest Port 1 View    Narrative    EXAM: XR CHEST PORT 1 VIEW  LOCATION: [facility]  DATE: 7/22/2025    INDICATION: Dyspnea  COMPARISON: CT 5/12/2025      Impression    IMPRESSION: Stable cardiomegaly and central vascular prominence. Low lung volumes. Stable asymmetric elevation of the right hemidiaphragm. Minimal opacities in the lateral left lung base likely reflect atelectasis. No pleural effusion.   UA with Microscopic reflex to Culture    Specimen: Urine, Brar Catheter   Result Value Ref Range    Color Urine Light Yellow Colorless, Straw, Light Yellow, Yellow    Appearance Urine Clear Clear    Glucose Urine 500 (A) Negative mg/dL    Bilirubin Urine Negative Negative    Ketones Urine Negative Negative mg/dL    Specific Gravity Urine 1.013 1.001 - 1.030    Blood Urine Negative Negative    pH Urine 5.5 5.0 - 7.0    Protein Albumin Urine Negative Negative mg/dL    Urobilinogen Urine Normal Normal mg/dL    Nitrite Urine Negative Negative    Leukocyte Esterase Urine 500 Gigi/uL (A) Negative    Bacteria Urine Few (A) None Seen /HPF    WBC Clumps Urine Present (A) None Seen /HPF    RBC Urine 3 (H) <=2 /HPF    WBC Urine 115 (H) <=5 /HPF    Squamous Epithelials Urine 1 <=1 /HPF    Transitional Epithelials Urine 1 <=1 /HPF    Narrative    Urine Culture ordered based on laboratory criteria   Lactic acid whole blood   Result Value Ref Range    Lactic Acid 2.5 (H) 0.7 - 2.0 mmol/L   Troponin T, High Sensitivity   Result Value Ref Range    Troponin T, High Sensitivity 27 (H) <=14 ng/L         RADIOLOGY  XR Chest Port 1 View   Final Result   IMPRESSION: Stable cardiomegaly and central vascular prominence. Low lung volumes. Stable  asymmetric elevation of the right hemidiaphragm. Minimal opacities in the lateral left lung base likely reflect atelectasis. No pleural effusion.      CT Chest Pulmonary Embolism w Contrast    (Results Pending)          EKG:    Rate: 110 bpm  Rhythm: Sinus tachycardia  Axis: Normal  Interval: Normal  Conduction: RBBB  QRS: Wide  ST: Normal  T-wave: Normal  QT: Not prolonged  Comparison EKG: No significant change when compared to 9 May 2025  Impression:  No acute ischemic change   I have independently reviewed and interpreted today's EKG, pending Cardiologist read            MEDICATIONS GIVEN IN THE EMERGENCY:  Medications   sodium chloride (PF) 0.9% PF flush 3 mL (has no administration in time range)   sodium chloride (PF) 0.9% PF flush 3 mL (3 mLs Intracatheter Not Given 7/22/25 1024)   vancomycin (VANCOCIN) 2,500 mg in sodium chloride 0.9 % 500 mL intermittent infusion (2,500 mg Intravenous $New Bag 7/22/25 1101)   lidocaine 1 % 0.1-5 mL (has no administration in time range)   lidocaine (LMX4) cream (has no administration in time range)   sodium chloride (PF) 0.9% PF flush 10-40 mL (has no administration in time range)   acetaminophen (TYLENOL) Suppository 650 mg (has no administration in time range)   sodium chloride 0.9% BOLUS 1,000 mL (0 mLs Intravenous Stopped 7/22/25 1235)   piperacillin-tazobactam (ZOSYN) 4.5 g vial to attach to  mL bag (0 g Intravenous Stopped 7/22/25 1102)   ipratropium - albuterol 0.5 mg/2.5 mg (3mg)/3 mL (DUONEB) neb solution 3 mL (3 mLs Nebulization $Given 7/22/25 1034)   sodium chloride 0.9% BOLUS 500 mL (500 mLs Intravenous $New Bag 7/22/25 1254)       NEW PRESCRIPTIONS STARTED AT TODAY'S ER VISIT  New Prescriptions    No medications on file        I, Viviana Navarro, am serving as a scribe to document services personally performed by Ravin Guillen D.O., based on my observations and the provider's statements to me.  I, Ravin Guillen D.O., attest that Viviana Navarro is acting in a  kaylee brooks, has observed my performance of the services and has documented them in accordance with my direction.     Ravin Guillen D.O.  Emergency Medicine  [facility] EMERGENCY DEPARTMENT  [address] [place] [id number] [phone]  Dept: [phone]       Ravin Guillen,   07/22/25 1680

## 2025-07-22 NOTE — ED TRIAGE NOTES
Pt to  ED via [facility] from home where she lives alone. Pt reports she woke this morning at 0730 with complaints of diff breathing fever and chills. Medics report pt found hypoxic in the 70's RA. NC made no improvement. Escalated to NRB mask with improvement of SpO2 into the mid 80's. DuoNeb administered during transport.   On arrival tachypneic and tachy on monitor. BP WNL. Unable to get good waveform on SpO2. Fingers cyanotic and cool  Hx frequent infections UTI and cellulitis      Triage Assessment (Adult)       Row Name 07/22/25 0950          Triage Assessment    Airway WDL WDL        Respiratory WDL    Respiratory WDL X        Skin Circulation/Temperature WDL    Skin Circulation/Temperature WDL X        Cardiac WDL    Cardiac WDL X        Peripheral/Neurovascular WDL    Peripheral Neurovascular WDL WDL        Cognitive/Neuro/Behavioral WDL    Cognitive/Neuro/Behavioral WDL WDL

## 2025-07-22 NOTE — ED NOTES
Bed: JNED-03  Expected date: 7/22/25  Expected time: 9:40 AM  Means of arrival: Ambulance  Comments:  [facility] fever low sats

## 2025-07-22 NOTE — PHARMACY-VANCOMYCIN DOSING SERVICE
Pharmacy Vancomycin Initial Note  Date of Service 2025  Patient's  1975  49 year old, female    Indication: Sepsis    Current estimated CrCl = Estimated Creatinine Clearance: 105.5 mL/min (based on SCr of 0.86 mg/dL).    Creatinine for last 3 days  No results found for requested labs within last 3 days.    Recent Vancomycin Level(s) for last 3 days  No results found for requested labs within last 3 days.      Vancomycin IV Administrations (past 72 hours)        No vancomycin orders with administrations in past 72 hours.                    Nephrotoxins and other renal medications (From now, onward)      Start     Dose/Rate Route Frequency Ordered Stop    25 1030  piperacillin-tazobactam (ZOSYN) 4.5 g vial to attach to  mL bag         4.5 g  over 30 Minutes Intravenous ONCE 25 1015      25 1030  vancomycin (VANCOCIN) 2,500 mg in sodium chloride 0.9 % 500 mL intermittent infusion         2,500 mg  over 2 Hours Intravenous ONCE 25 1021              Contrast Orders - past 72 hours (72h ago, onward)      None            Plan:  Start vancomycin 2500 mg IV once.   Please re-consult pharmacy if vancomycin is to be continued.    Cyndie Das

## 2025-07-22 NOTE — PROGRESS NOTES
Patient A&Ox4, makes needs known. Vitally has been hypotensive, MD aware. Has received 1.5L fluid bolus in ED for pressures. Denies pain, but lower extremities very sensitive to touch. Tmax 102.8 axillary, given rectal tylenol. Lower right extremity dusky, pulses palpable and sensation intact. Oxygen saturations maintained on 40% BiPAP. Lower left extremity warm to touch, erythremic, with some noted oozing. Bilteral legs dry and flaky. Brar intact draining clear yellow urine. COVID test collected with results pending. PICC will be placed when patient goes to ICU. Patient report given to KINDRA Ojeda. Belongings sent with patient.

## 2025-07-22 NOTE — H&P
"Critical Care Admission Note      07/22/2025    Name: Bess Enamorado MRN#: [id number]   Age: 49 year old YOB: 1975                    Problem List:   Active Problems:    Acute respiratory failure with hypoxia (H)    Septic shock (H)    Clinically Significant Risk Factors Present on Admission                # Drug Induced Coagulation Defect: home medication list includes an anticoagulant medication  # Drug Induced Platelet Defect: home medication list includes an antiplatelet medication   # Hypertension: Noted on problem list  # Chronic heart failure with preserved ejection fraction: heart failure noted on problem list and last echo with EF >50%  # Circulatory Shock: required vasopressors within past 24 hours            # Morbid Obesity: Estimated body mass index is 51.7 kg/m  as calculated from the following:    Height as of this encounter: 1.626 m (5' 4\").    Weight as of this encounter: 136.6 kg (301 lb 3.2 oz).       # Financial/Environmental Concerns:                     HPI:     49-year-old female with a history of multiple sclerosis not on treatment, obstructive sleep apnea, obesity hypoventilation syndrome, severe pulmonary hypertension, diastolic heart failure, lymphedema of lower extremities, pulmonary embolism, recurrent hospitalizations for sepsis last 1 in May of this year with Pseudomonas.  She woke up today with pain in both legs.  Came to the ED where she was found to be febrile and hypotensive.  She was tachypneic and was started on BiPAP.    Received antibiotics and some IV fluids based on the sepsis protocol        Assessment and plan :     I have personally reviewed the labs, imaging studies, cultures and discussed the case with referring physician and consulting physicians.     My assessment and plan by system for this patient is as follows:    Neurology/Psychiatry:   Multiple sclerosis not on treatment.  I do not see any neurology note.  MRI with multiple demyelinating " lesions    Cardiovascular:   Heart failure with preserved ejection fraction, chronic diastolic.  Severe pulmonary hypertension secondary to heart failure and NARCISA/OHS    Not hypotensive and vasodilated secondary to sepsis.  Minimize IV fluids.  Use norepinephrine to maintain MAP    Will get a limited 2D echo    Continue Eliquis for prior history of pulmonary embolism    Pulmonary/Ventilator Management:   Acute on chronic hypoxic respiratory failure  Patient does have a history of severe NARCISA and OHS on CPAP with oxygen bleed    Was tachypneic in the emergency room.  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 while on BiPAP  Chronic liver disease with possible cirrhosis and status postcholecystectomy  The above secondary to vascular congestion      Renal/Fluids/Electrolytes:     - monitor function and electrolytes as needed with replacement per ICU protocols. - generally avoid nephrotoxic agents such as NSAID, IV contrast unless specifically required  - adjust medications as needed for renal clearance  - follow I/O's as appropriate.    Infectious Disease:   Sepsis.  Urine positive for leukoesterase and WBC clumps  Urine culture pending    Complaints of leg pain she does have some erythema.  Will scan and treat for cellulitis as well    CT abdomen pelvis to make sure she does not have a kidney stone.  She is pain-free      Endocrine:   Sliding scale insulin  Plan  - ICU insulin protocol, goal sugar <180      ICU Prophylaxis:   1. DVT: Apixaban    3. Stress Ulcer: PPI           Medical History:     Past Medical History:   Diagnosis Date    Acute on chronic diastolic congestive heart failure (H) 02/09/2022    Arthritis 2019    Hips    ASCUS favor benign 08/2015    Neg HPV    Deep vein thrombosis (DVT) of left lower extremity (H) 03/25/2025    Depressive  disorder 2010    H/O colposcopy with cervical biopsy 09/14/2015    Bx & ECC - negative    Hypertension 2019    LSIL on Pap smear 07/2014    Neg high risk HPV    Multiple sclerosis (H) 08/29/2005 9/18/200pt dx with MS 2004--dx by neurolgist and is followed by Dr. Harris    Myocardial infarction (H)     Obese     Pneumonia due to infectious organism, unspecified laterality, unspecified part of lung 02/08/2022    Sepsis (Temp 101, , WBC 13.0) 10/23/2018    Sepsis, due to unspecified organism, unspecified whether acute organ dysfunction present (H) 03/16/2024    Severe sepsis (H) 05/08/2025    Shingles 10/2016    SIRS (systemic inflammatory response syndrome) (H) 03/04/2021    Sleep apnea     UNSPEC CONSTIPATION 09/18/2006 9/18/2006   Has tried otc suppository and otc lax.      Past Surgical History:   Procedure Laterality Date    CHOLECYSTECTOMY, LAPOROSCOPIC      Cholecystectomy, Laparoscopic    COLONOSCOPY  2007?    Bathroom issue..results normal    COMBINED CYSTOSCOPY, RETROGRADES, EXCHANGE STENT URETER(S) Right 2/21/2022    Procedure: CYSTOSCOPY, WITH right RETROGRADE PYELOGRAM AND right URETERAL STENT PLACEMENT;  Surgeon: Doyle Palomares MD;  Location: [facility] OR    OPEN REDUCTION INTERNAL FIXATION TOE(S)  4/13/2012    Procedure:OPEN REDUCTION INTERNAL FIXATION TOE(S); Open reduction internal fixation right proximal fifth metatarsal fracture-   Anes-choice block; Surgeon:LEY, JEFFREY DUANE; Location:[place] OR    PICC SINGLE LUMEN PLACEMENT  3/20/2024    PICC TRIPLE LUMEN PLACEMENT  2/21/2022          Social History     Socioeconomic History    Marital status: Single     Spouse name: Not on file    Number of children: Not on file    Years of education: Not on file    Highest education level: Not on file   Occupational History     Employer: [employer]   Tobacco Use    Smoking status: Never    Smokeless tobacco: Never   Vaping Use    Vaping status: Never Used   Substance and Sexual Activity     Alcohol use: Yes     Comment: social    Drug use: No    Sexual activity: Not Currently     Partners: Male     Birth control/protection: Pill   Other Topics Concern    Parent/sibling w/ CABG, MI or angioplasty before 65F 55M? No   Social History Narrative    , 3rd-5th grade     Social Drivers of Health     Financial Resource Strain: Low Risk  (7/22/2025)    Financial Resource Strain     Within the past 12 months, have you or your family members you live with been unable to get utilities (heat, electricity) when it was really needed?: No   Food Insecurity: Low Risk  (7/22/2025)    Food Insecurity     Within the past 12 months, did you worry that your food would run out before you got money to buy more?: No     Within the past 12 months, did the food you bought just not last and you didn t have money to get more?: No   Transportation Needs: Low Risk  (7/22/2025)    Transportation Needs     Within the past 12 months, has lack of transportation kept you from medical appointments, getting your medicines, non-medical meetings or appointments, work, or from getting things that you need?: No   Recent Concern: Transportation Needs - High Risk (5/8/2025)    Transportation Needs     Within the past 12 months, has lack of transportation kept you from medical appointments, getting your medicines, non-medical meetings or appointments, work, or from getting things that you need?: Yes   Physical Activity: Inactive (3/21/2024)    Exercise Vital Sign     Days of Exercise per Week: 0 days     Minutes of Exercise per Session: 0 min   Stress: No Stress Concern Present (12/4/2020)    [employer] [place] [employer] - Occupational Stress Questionnaire     Feeling of Stress : Only a little   Social Connections: Unknown (3/1/2022)    Social Connection and Isolation Panel [NHANES]     Frequency of Communication with Friends and Family: Twice a week     Frequency of Social Gatherings with Friends and Family:  Twice a week     Attends Worship Services: Not on file     Active Member of Clubs or Organizations: Not on file     Attends Club or Organization Meetings: Not on file     Marital Status: Not on file   Interpersonal Safety: Low Risk  (7/22/2025)    Interpersonal Safety     Do you feel physically and emotionally safe where you currently live?: Yes     Within the past 12 months, have you been hit, slapped, kicked or otherwise physically hurt by someone?: No     Within the past 12 months, have you been humiliated or emotionally abused in other ways by your partner or ex-partner?: No   Housing Stability: Low Risk  (7/22/2025)    Housing Stability     Do you have housing? : Yes     Are you worried about losing your housing?: No   Recent Concern: Housing Stability - High Risk (5/8/2025)    Housing Stability     Do you have housing? : No     Are you worried about losing your housing?: No      No Known Allergies           Key Medications:     Current Facility-Administered Medications   Medication Dose Route Frequency Provider Last Rate Last Admin    apixaban ANTICOAGULANT (ELIQUIS) tablet 5 mg  5 mg Oral BID Fabian Miller MD        bumetanide (BUMEX) tablet 4 mg  4 mg Oral BID Fabian Miller MD        escitalopram (LEXAPRO) tablet 10 mg  10 mg Oral QAM Fabian Miller MD        insulin aspart (NovoLOG) injection (RAPID ACTING)  1-7 Units Subcutaneous Q4H Fabian Miller MD        piperacillin-tazobactam (ZOSYN) 4.5 g vial to attach to  mL bag  4.5 g Intravenous Q6H Fabian Miller MD        sodium chloride (PF) 0.9% PF flush 3 mL  3 mL Intracatheter Q8H Ravin Guillen DO        spironolactone (ALDACTONE) tablet 25 mg  25 mg Oral Daily Fabian Miller MD        [START ON 7/23/2025] vancomycin (VANCOCIN) 1,000 mg in 200 mL dextrose intermittent infusion  1,000 mg Intravenous Q12H Fabian Miller MD         Current Facility-Administered Medications   Medication Dose  Route Frequency Provider Last Rate Last Admin    norepinephrine (LEVOPHED) 4 mg in  mL infusion PREMIX  0.01-0.6 mcg/kg/min Intravenous Continuous Fabian Miller MD 19.7 mL/hr at 07/22/25 1507 0.04 mcg/kg/min at 07/22/25 1507    norepinephrine (LEVOPHED) 4 mg in  mL PERIPHERAL infusion  0.01-0.2 mcg/kg/min Intravenous Continuous Fabian Miller MD 14.8 mL/hr at 07/22/25 1426 0.03 mcg/kg/min at 07/22/25 1426    Patient is already receiving anticoagulation with heparin, enoxaparin (LOVENOX), warfarin (COUMADIN)  or other anticoagulant medication   Does not apply Continuous PRN Fabian Miller MD            Home Meds  Current Facility-Administered Medications   Medication Dose Route Frequency Provider Last Rate Last Admin    acetaminophen (TYLENOL) tablet 325 mg  325 mg Oral Q4H PRN Fabian Miller MD        apixaban ANTICOAGULANT (ELIQUIS) tablet 5 mg  5 mg Oral BID Fabian Miller MD        bumetanide (BUMEX) tablet 4 mg  4 mg Oral BID Fabian Miller MD        glucose gel 15-30 g  15-30 g Oral Q15 Min PRN Fabian Miller MD        Or    dextrose 50 % injection 25-50 mL  25-50 mL Intravenous Q15 Min PRN Fabian Miller MD        Or    glucagon injection 1 mg  1 mg Subcutaneous Q15 Min PRN Fabian Miller MD        escitalopram (LEXAPRO) tablet 10 mg  10 mg Oral QAM Fabian Miller MD        insulin aspart (NovoLOG) injection (RAPID ACTING)  1-7 Units Subcutaneous Q4H Fabian Miller MD        lidocaine (LMX4) cream   Topical Q1H PRN Ravin Guillen DO        lidocaine 1 % 0.1-5 mL  0.1-5 mL Other Q1H PRN Ravin Guillen DO        norepinephrine (LEVOPHED) 4 mg in  mL infusion PREMIX  0.01-0.6 mcg/kg/min Intravenous Continuous Fabian Miller MD 19.7 mL/hr at 07/22/25 1507 0.04 mcg/kg/min at 07/22/25 1507    norepinephrine (LEVOPHED) 4 mg in  mL PERIPHERAL infusion  0.01-0.2 mcg/kg/min Intravenous Continuous  Fabian Miller MD 14.8 mL/hr at 07/22/25 1426 0.03 mcg/kg/min at 07/22/25 1426    Patient is already receiving anticoagulation with heparin, enoxaparin (LOVENOX), warfarin (COUMADIN)  or other anticoagulant medication   Does not apply Continuous PRN Fabian Miller MD        piperacillin-tazobactam (ZOSYN) 4.5 g vial to attach to  mL bag  4.5 g Intravenous Q6H Fabian Miller MD        sodium chloride (PF) 0.9% PF flush 10-40 mL  10-40 mL Intracatheter Once PRN Ravin Guillen DO        sodium chloride (PF) 0.9% PF flush 3 mL  3 mL Intracatheter q1 min prn Ravin Guillen DO        sodium chloride (PF) 0.9% PF flush 3 mL  3 mL Intracatheter Q8H Ravin Guillen DO        spironolactone (ALDACTONE) tablet 25 mg  25 mg Oral Daily Fabian Miller MD        [START ON 7/23/2025] vancomycin (VANCOCIN) 1,000 mg in 200 mL dextrose intermittent infusion  1,000 mg Intravenous Q12H Fabian Miller MD                  Physical Examination:   Temp:  [101.7  F (38.7  C)-102.8  F (39.3  C)] 102.8  F (39.3  C)  Pulse:  [107-126] 120  Resp:  [22-35] 29  BP: ()/(45-77) 83/45  FiO2 (%):  [50 %] 50 %  SpO2:  [78 %-100 %] 95 %    Intake/Output Summary (Last 24 hours) at 7/22/2025 1525  Last data filed at 7/22/2025 1102  Gross per 24 hour   Intake 100 ml   Output --   Net 100 ml     Wt Readings from Last 4 Encounters:   07/22/25 (!) 136.6 kg (301 lb 3.2 oz)   07/01/25 118.8 kg (262 lb)   06/16/25 118.4 kg (261 lb)   05/23/25 111 kg (244 lb 12.8 oz)     BP - Mean:  [59-84] 59  FiO2 (%): 50 %, Resp: 29  Recent Labs   Lab 07/22/25  1023 07/22/25  1011   PH 7.45  --    PCO2 33*  --    PO2 174*  --    HCO3 23  --    O2PER 50 80       GEN: no acute distress   HEENT: head ncat, sclera anicteric, OP patent, trachea midline   PULM: clear anteriorly    CV/COR: RRR S1S2 no gallop,  No rub, no murmur  ABD: soft nontender, hypoactive bowel sounds, no mass  EXT: Extremities are warm.  Swelling  "and chronic changes consistent with lymphedema  NEURO: grossly intact  LINES: clean, dry intact         Data:   All data and imaging reviewed     ROUTINE ICU LABS (Last four results)  CMP  Recent Labs   Lab 07/22/25  1001      POTASSIUM 3.8   CHLORIDE 100   CO2 25   ANIONGAP 15   GLC 94   BUN 21.7*   CR 0.84   GFRESTIMATED 84   EVITA 9.4   PROTTOTAL 8.2   ALBUMIN 4.0   BILITOTAL 2.6*   ALKPHOS 88   AST 17   ALT 7     CBC  Recent Labs   Lab 07/22/25  1001   WBC 9.2   RBC 4.70   HGB 12.5   HCT 39.9   MCV 85   MCH 26.6   MCHC 31.3*   RDW 15.3*        INR  Recent Labs   Lab 07/22/25  1001   INR 1.37*     Arterial Blood Gas  Recent Labs   Lab 07/22/25  1023 07/22/25  1011   PH 7.45  --    PCO2 33*  --    PO2 174*  --    HCO3 23  --    O2PER 50 80       All cultures:  No results for input(s): \"CULT\" in the last 168 hours.  Recent Results (from the past 24 hours)   XR Chest Port 1 View    Narrative    EXAM: XR CHEST PORT 1 VIEW  LOCATION: [facility]  DATE: 7/22/2025    INDICATION: Dyspnea  COMPARISON: CT 5/12/2025      Impression    IMPRESSION: Stable cardiomegaly and central vascular prominence. Low lung volumes. Stable asymmetric elevation of the right hemidiaphragm. Minimal opacities in the lateral left lung base likely reflect atelectasis. No pleural effusion.   CT Chest Pulmonary Embolism w Contrast    Narrative    EXAM: CT CHEST PULMONARY EMBOLISM W CONTRAST  LOCATION: [facility]  DATE: 7/22/2025    INDICATION: Septic, hypoxia, concern for possible PNA or PE  COMPARISON: CTA chest 5/12/2025.  TECHNIQUE: CT chest pulmonary angiogram during arterial phase injection of IV contrast. Multiplanar reformats and MIP reconstructions were performed. Dose reduction techniques were used.   CONTRAST: IsoVue 370 90mL    FINDINGS:    ANGIOGRAM CHEST: Main pulmonary trunk is dilated to 4.8 cm, suggestive of chronic pulmonary arterial hypertension. No evidence of pulmonary " embolism. A 0.7 cm aneurysm of a lateral segmental pulmonary artery branch within the right middle lobe (4/#143)   is unchanged. Thoracic aorta is normal caliber and negative for aortic dissection.    LUNGS AND PLEURA: No consolidations, pleural effusions, or pneumothorax. Central airways are patent. Scattered punctate calcified granulomas are unchanged. Small intrapulmonary lymph node along the left major fissure is unchanged (6/#157). Central   airways are patent.    MEDIASTINUM/AXILLAE: Mitral annular calcifications. No significant pericardial effusion. No enlarged thoracic lymph node.    CORONARY ARTERY CALCIFICATION: None.    UPPER ABDOMEN: Morphologic changes of chronic liver disease and possible cirrhosis. Cholecystectomy. No acute abnormality.    MUSCULOSKELETAL: Multilevel degenerative changes of the spine. No acute bony abnormality.      Impression    IMPRESSION:    1.  No pulmonary embolism, evidence of pneumonia, or other acute intrathoracic abnormality.    2.  Dilated pulmonary arteries consistent with chronic pulmonary arterial hypertension.    3.  A fusiform 0.7 cm pulmonary artery aneurysm within the right middle lobe is unchanged.         Billing: This patient is critically ill: Yes. Total critical care time today 45 min exclusive of procedures or teaching.

## 2025-07-23 ENCOUNTER — APPOINTMENT (OUTPATIENT)
Dept: CARDIOLOGY | Facility: HOSPITAL | Age: 50
DRG: 871 | End: 2025-07-23
Attending: INTERNAL MEDICINE
Payer: COMMERCIAL

## 2025-07-23 ENCOUNTER — APPOINTMENT (OUTPATIENT)
Dept: CT IMAGING | Facility: HOSPITAL | Age: 50
End: 2025-07-23
Attending: INTERNAL MEDICINE
Payer: COMMERCIAL

## 2025-07-23 LAB
ANION GAP SERPL CALCULATED.3IONS-SCNC: 13 MMOL/L (ref 7–15)
BACTERIA UR CULT: NO GROWTH
BUN SERPL-MCNC: 22.4 MG/DL (ref 6–20)
CALCIUM SERPL-MCNC: 8.3 MG/DL (ref 8.8–10.4)
CHLORIDE SERPL-SCNC: 108 MMOL/L (ref 98–107)
CREAT SERPL-MCNC: 0.98 MG/DL (ref 0.51–0.95)
EGFRCR SERPLBLD CKD-EPI 2021: 70 ML/MIN/1.73M2
ERYTHROCYTE [DISTWIDTH] IN BLOOD BY AUTOMATED COUNT: 15.5 % (ref 10–15)
GLUCOSE BLDC GLUCOMTR-MCNC: 104 MG/DL (ref 70–99)
GLUCOSE BLDC GLUCOMTR-MCNC: 113 MG/DL (ref 70–99)
GLUCOSE BLDC GLUCOMTR-MCNC: 118 MG/DL (ref 70–99)
GLUCOSE BLDC GLUCOMTR-MCNC: 57 MG/DL (ref 70–99)
GLUCOSE BLDC GLUCOMTR-MCNC: 64 MG/DL (ref 70–99)
GLUCOSE BLDC GLUCOMTR-MCNC: 65 MG/DL (ref 70–99)
GLUCOSE BLDC GLUCOMTR-MCNC: 68 MG/DL (ref 70–99)
GLUCOSE BLDC GLUCOMTR-MCNC: 69 MG/DL (ref 70–99)
GLUCOSE BLDC GLUCOMTR-MCNC: 74 MG/DL (ref 70–99)
GLUCOSE BLDC GLUCOMTR-MCNC: 80 MG/DL (ref 70–99)
GLUCOSE BLDC GLUCOMTR-MCNC: 85 MG/DL (ref 70–99)
GLUCOSE BLDC GLUCOMTR-MCNC: 99 MG/DL (ref 70–99)
GLUCOSE SERPL-MCNC: 81 MG/DL (ref 70–99)
HCO3 SERPL-SCNC: 19 MMOL/L (ref 22–29)
HCT VFR BLD AUTO: 37.1 % (ref 35–47)
HGB BLD-MCNC: 11.7 G/DL (ref 11.7–15.7)
LVEF ECHO: NORMAL
MCH RBC QN AUTO: 26.1 PG (ref 26.5–33)
MCHC RBC AUTO-ENTMCNC: 31.5 G/DL (ref 31.5–36.5)
MCV RBC AUTO: 83 FL (ref 78–100)
PLATELET # BLD AUTO: 182 10E3/UL (ref 150–450)
POTASSIUM SERPL-SCNC: 3.2 MMOL/L (ref 3.4–5.3)
RBC # BLD AUTO: 4.49 10E6/UL (ref 3.8–5.2)
SODIUM SERPL-SCNC: 140 MMOL/L (ref 135–145)
WBC # BLD AUTO: 19.1 10E3/UL (ref 4–11)

## 2025-07-23 PROCEDURE — 74176 CT ABD & PELVIS W/O CONTRAST: CPT

## 2025-07-23 PROCEDURE — 999N000185 HC STATISTIC TRANSPORT TIME EA 15 MIN

## 2025-07-23 PROCEDURE — 255N000002 HC RX 255 OP 636: Performed by: INTERNAL MEDICINE

## 2025-07-23 PROCEDURE — 250N000009 HC RX 250: Performed by: INTERNAL MEDICINE

## 2025-07-23 PROCEDURE — 258N000001 HC RX 258: Performed by: INTERNAL MEDICINE

## 2025-07-23 PROCEDURE — 80048 BASIC METABOLIC PNL TOTAL CA: CPT | Performed by: INTERNAL MEDICINE

## 2025-07-23 PROCEDURE — 99291 CRITICAL CARE FIRST HOUR: CPT | Performed by: INTERNAL MEDICINE

## 2025-07-23 PROCEDURE — 200N000001 HC R&B ICU

## 2025-07-23 PROCEDURE — 250N000013 HC RX MED GY IP 250 OP 250 PS 637: Performed by: INTERNAL MEDICINE

## 2025-07-23 PROCEDURE — 93325 DOPPLER ECHO COLOR FLOW MAPG: CPT

## 2025-07-23 PROCEDURE — 93321 DOPPLER ECHO F-UP/LMTD STD: CPT | Mod: 26 | Performed by: GENERAL ACUTE CARE HOSPITAL

## 2025-07-23 PROCEDURE — 94660 CPAP INITIATION&MGMT: CPT

## 2025-07-23 PROCEDURE — 999N000287 HC ICU ADULT ROUNDING, EACH 10 MINS

## 2025-07-23 PROCEDURE — 87040 BLOOD CULTURE FOR BACTERIA: CPT | Performed by: INTERNAL MEDICINE

## 2025-07-23 PROCEDURE — 93308 TTE F-UP OR LMTD: CPT | Mod: 26 | Performed by: GENERAL ACUTE CARE HOSPITAL

## 2025-07-23 PROCEDURE — 85014 HEMATOCRIT: CPT | Performed by: INTERNAL MEDICINE

## 2025-07-23 PROCEDURE — 73700 CT LOWER EXTREMITY W/O DYE: CPT | Mod: 50

## 2025-07-23 PROCEDURE — 250N000011 HC RX IP 250 OP 636: Performed by: INTERNAL MEDICINE

## 2025-07-23 PROCEDURE — 36415 COLL VENOUS BLD VENIPUNCTURE: CPT | Performed by: INTERNAL MEDICINE

## 2025-07-23 PROCEDURE — 999N000254 HC STATISTIC VENTILATOR TRANSFER

## 2025-07-23 PROCEDURE — 73700 CT LOWER EXTREMITY W/O DYE: CPT | Mod: 50,XS

## 2025-07-23 PROCEDURE — G0463 HOSPITAL OUTPT CLINIC VISIT: HCPCS

## 2025-07-23 PROCEDURE — 250N000011 HC RX IP 250 OP 636: Mod: JZ | Performed by: INTERNAL MEDICINE

## 2025-07-23 PROCEDURE — 999N000157 HC STATISTIC RCP TIME EA 10 MIN

## 2025-07-23 PROCEDURE — 93325 DOPPLER ECHO COLOR FLOW MAPG: CPT | Mod: 26 | Performed by: GENERAL ACUTE CARE HOSPITAL

## 2025-07-23 RX ORDER — NALOXONE HYDROCHLORIDE 0.4 MG/ML
0.4 INJECTION, SOLUTION INTRAMUSCULAR; INTRAVENOUS; SUBCUTANEOUS
Status: DISCONTINUED | OUTPATIENT
Start: 2025-07-23 | End: 2025-07-30 | Stop reason: HOSPADM

## 2025-07-23 RX ORDER — ESCITALOPRAM OXALATE 10 MG/1
10 TABLET ORAL EVERY MORNING
Status: DISCONTINUED | OUTPATIENT
Start: 2025-07-24 | End: 2025-07-30 | Stop reason: HOSPADM

## 2025-07-23 RX ORDER — HYDROMORPHONE HCL IN WATER/PF 6 MG/30 ML
0.2 PATIENT CONTROLLED ANALGESIA SYRINGE INTRAVENOUS
Status: DISCONTINUED | OUTPATIENT
Start: 2025-07-23 | End: 2025-07-24

## 2025-07-23 RX ORDER — NALOXONE HYDROCHLORIDE 0.4 MG/ML
0.2 INJECTION, SOLUTION INTRAMUSCULAR; INTRAVENOUS; SUBCUTANEOUS
Status: DISCONTINUED | OUTPATIENT
Start: 2025-07-23 | End: 2025-07-30 | Stop reason: HOSPADM

## 2025-07-23 RX ORDER — ACETAMINOPHEN 500 MG
500 TABLET ORAL EVERY 6 HOURS PRN
Status: DISCONTINUED | OUTPATIENT
Start: 2025-07-23 | End: 2025-07-30 | Stop reason: HOSPADM

## 2025-07-23 RX ADMIN — PIPERACILLIN AND TAZOBACTAM 4.5 G: 4; .5 INJECTION, POWDER, FOR SOLUTION INTRAVENOUS at 16:01

## 2025-07-23 RX ADMIN — HYDROMORPHONE HYDROCHLORIDE 0.2 MG: 0.2 INJECTION, SOLUTION INTRAMUSCULAR; INTRAVENOUS; SUBCUTANEOUS at 09:51

## 2025-07-23 RX ADMIN — ALTEPLASE 2 MG: 2.2 INJECTION, POWDER, LYOPHILIZED, FOR SOLUTION INTRAVENOUS at 09:36

## 2025-07-23 RX ADMIN — NOREPINEPHRINE BITARTRATE 0.07 MCG/KG/MIN: 0.02 INJECTION, SOLUTION INTRAVENOUS at 11:21

## 2025-07-23 RX ADMIN — PIPERACILLIN AND TAZOBACTAM 4.5 G: 4; .5 INJECTION, POWDER, FOR SOLUTION INTRAVENOUS at 05:42

## 2025-07-23 RX ADMIN — VANCOMYCIN HYDROCHLORIDE 1000 MG: 1 INJECTION, SOLUTION INTRAVENOUS at 06:53

## 2025-07-23 RX ADMIN — NOREPINEPHRINE BITARTRATE 0.02 MCG/KG/MIN: 0.02 INJECTION, SOLUTION INTRAVENOUS at 03:37

## 2025-07-23 RX ADMIN — PIPERACILLIN AND TAZOBACTAM 4.5 G: 4; .5 INJECTION, POWDER, FOR SOLUTION INTRAVENOUS at 09:51

## 2025-07-23 RX ADMIN — ACETAMINOPHEN 500 MG: 500 TABLET, FILM COATED ORAL at 22:25

## 2025-07-23 RX ADMIN — VANCOMYCIN HYDROCHLORIDE 1000 MG: 1 INJECTION, SOLUTION INTRAVENOUS at 20:01

## 2025-07-23 RX ADMIN — PERFLUTREN 2 ML: 6.52 INJECTION, SUSPENSION INTRAVENOUS at 11:24

## 2025-07-23 RX ADMIN — PIPERACILLIN AND TAZOBACTAM 4.5 G: 4; .5 INJECTION, POWDER, FOR SOLUTION INTRAVENOUS at 21:30

## 2025-07-23 RX ADMIN — ACETAMINOPHEN 500 MG: 500 TABLET, FILM COATED ORAL at 13:29

## 2025-07-23 RX ADMIN — APIXABAN 5 MG: 5 TABLET, FILM COATED ORAL at 20:01

## 2025-07-23 RX ADMIN — PANTOPRAZOLE SODIUM 40 MG: 40 INJECTION, POWDER, FOR SOLUTION INTRAVENOUS at 16:01

## 2025-07-23 RX ADMIN — NOREPINEPHRINE BITARTRATE 0.03 MCG/KG/MIN: 0.02 INJECTION, SOLUTION INTRAVENOUS at 22:41

## 2025-07-23 RX ADMIN — HYDROMORPHONE HYDROCHLORIDE 0.2 MG: 0.2 INJECTION, SOLUTION INTRAMUSCULAR; INTRAVENOUS; SUBCUTANEOUS at 21:37

## 2025-07-23 RX ADMIN — APIXABAN 5 MG: 5 TABLET, FILM COATED ORAL at 10:16

## 2025-07-23 RX ADMIN — HYDROMORPHONE HYDROCHLORIDE 0.2 MG: 0.2 INJECTION, SOLUTION INTRAMUSCULAR; INTRAVENOUS; SUBCUTANEOUS at 16:01

## 2025-07-23 RX ADMIN — DEXTROSE MONOHYDRATE 25 ML: 25 INJECTION, SOLUTION INTRAVENOUS at 00:26

## 2025-07-23 ASSESSMENT — ACTIVITIES OF DAILY LIVING (ADL)
ADLS_ACUITY_SCORE: 62
ADLS_ACUITY_SCORE: 59
ADLS_ACUITY_SCORE: 62
ADLS_ACUITY_SCORE: 62
ADLS_ACUITY_SCORE: 60
ADLS_ACUITY_SCORE: 59
ADLS_ACUITY_SCORE: 60
ADLS_ACUITY_SCORE: 60
ADLS_ACUITY_SCORE: 62
ADLS_ACUITY_SCORE: 62
ADLS_ACUITY_SCORE: 60
ADLS_ACUITY_SCORE: 62
ADLS_ACUITY_SCORE: 60
ADLS_ACUITY_SCORE: 62
DEPENDENT_IADLS:: CLEANING;LAUNDRY;TRANSPORTATION
ADLS_ACUITY_SCORE: 62
ADLS_ACUITY_SCORE: 60
ADLS_ACUITY_SCORE: 62

## 2025-07-23 NOTE — PLAN OF CARE
Goal Outcome Evaluation:      Plan of Care Reviewed With: patient    Overall Patient Progress: declining    Outcome Evaluation: More hypotensive this AM with increasing vasopressor needs.      [facility] - ICU    RN Progress Note:            Pertinent Assessments:      Please refer to flowsheet rows for full assessment     On BiPap about half of night. Okay for HFNC when awake per Dr. Ibarra. Alert and oriented. Vasopressor needs increased during the morning. CT abdomen and legs obtained overnight. Reports signficant pain in BLEs with movement and repositioning, but denies pain when at rest. Blood cultures drawn and sent. Tolerates BiPap well.            Key Events - This Shift:            RN Managed Protocols Ordered:  No  Protocols:  PRN'S:  Protocols Status:                 Barriers to Discharge / Downgrade:     Vasopressor needs and need for ventilation support         Point of Contact Update: YES-OR-NO: Yes  If No, reason:   Name:  Phone Number:  Summary of Conversation:

## 2025-07-23 NOTE — CONSULTS
Care Management Initial Consult    General Information  Assessment completed with: VM-chart review,         Primary Care Provider verified and updated as needed:     Readmission within the last 30 days: current reason for admission unrelated to previous admission   Return Category: New Diagnosis  Reason for Consult: discharge planning  Advance Care Planning:       General Information Comments: lives alone    Communication Assessment  Patient's communication style: spoken language (English or Bilingual)    Hearing Difficulty or Deaf: no   Wear Glasses or Blind: other (see comments) (has glasses but not needed)    Cognitive  Cognitive/Neuro/Behavioral: WDL  Level of Consciousness: alert     Orientation: oriented x 4             Living Environment:   People in home: alone     Current living Arrangements: town home      Able to return to prior arrangements:         Family/Social Support:  Care provided by: self  Provides care for: no one, unable/limited ability to care for self     Support system:            Description of Support System:           Current Resources:   Patient receiving home care services: Yes  Skilled Home Care Services: Skilled Nursing, Physical Therapy     Community Resources: Home Care  Equipment currently used at home: grab bar, toilet, walker, rolling, wheelchair, manual  Supplies currently used at home: Incontinence Supplies, Oxygen Tubing/Supplies, Other    Employment/Financial:  Employment Status:          Financial Concerns:             Does the patient's insurance plan have a 3 day qualifying hospital stay waiver?  No    Lifestyle & Psychosocial Needs:  Social Drivers of Health     Food Insecurity: Low Risk  (7/22/2025)    Food Insecurity     Within the past 12 months, did you worry that your food would run out before you got money to buy more?: No     Within the past 12 months, did the food you bought just not last and you didn t have money to get more?: No   Depression: Not at risk  (4/1/2025)    PHQ-2     PHQ-2 Score: 2   Housing Stability: Low Risk  (7/22/2025)    Housing Stability     Do you have housing? : Yes     Are you worried about losing your housing?: No   Recent Concern: Housing Stability - High Risk (5/8/2025)    Housing Stability     Do you have housing? : No     Are you worried about losing your housing?: No   Tobacco Use: Low Risk  (7/22/2025)    Patient History     Smoking Tobacco Use: Never     Smokeless Tobacco Use: Never     Passive Exposure: Not on file   Financial Resource Strain: Low Risk  (7/22/2025)    Financial Resource Strain     Within the past 12 months, have you or your family members you live with been unable to get utilities (heat, electricity) when it was really needed?: No   Alcohol Use: Not on file   Transportation Needs: Low Risk  (7/22/2025)    Transportation Needs     Within the past 12 months, has lack of transportation kept you from medical appointments, getting your medicines, non-medical meetings or appointments, work, or from getting things that you need?: No   Recent Concern: Transportation Needs - High Risk (5/8/2025)    Transportation Needs     Within the past 12 months, has lack of transportation kept you from medical appointments, getting your medicines, non-medical meetings or appointments, work, or from getting things that you need?: Yes   Physical Activity: Inactive (3/21/2024)    Exercise Vital Sign     Days of Exercise per Week: 0 days     Minutes of Exercise per Session: 0 min   Interpersonal Safety: Low Risk  (7/22/2025)    Interpersonal Safety     Do you feel physically and emotionally safe where you currently live?: Yes     Within the past 12 months, have you been hit, slapped, kicked or otherwise physically hurt by someone?: No     Within the past 12 months, have you been humiliated or emotionally abused in other ways by your partner or ex-partner?: No   Stress: No Stress Concern Present (12/4/2020)    [employer] [place] [employer] - Occupational Stress Questionnaire     Feeling of Stress : Only a little   Social Connections: Unknown (3/1/2022)    Social Connection and Isolation Panel [NHANES]     Frequency of Communication with Friends and Family: Twice a week     Frequency of Social Gatherings with Friends and Family: Twice a week     Attends [employer] Services: Not on file     Active Member of Clubs or Organizations: Not on file     Attends Club or Organization Meetings: Not on file     Marital Status: Not on file   Health Literacy: Not on file       Functional Status:  Prior to admission patient needed assistance:   Dependent ADLs:: Ambulation-walker, Wheelchair-independent  Dependent IADLs:: Cleaning, Laundry, Transportation  Assesssment of Functional Status: Not at baseline with ADL Functioning    Mental Health Status:  Mental Health Status: No Current Concerns       Chemical Dependency Status:                Values/Beliefs:  Spiritual, Cultural Beliefs, Ep[employer] Practices, Values that affect care:                 Discussed  Partnership in Safe Discharge Planning  document with patient/family: No    Additional Information:  Lives alone and usually has homecare post hospitalization. [facility]. Pt will likely need home w/homecare at discharge or TCU placement. Pt is on BiPap and very sleepy, uncomfortable without the BiPap      Next Steps:   Medical stability  CM to follow for recommendations    Charo Flower RN

## 2025-07-23 NOTE — PROGRESS NOTES
"Critical Care Admission Note      07/23/2025    Name: Bess Enamorado MRN#: [id number]   Age: 49 year old YOB: 1975                    Problem List:   Active Problems:    Acute respiratory failure with hypoxia (H)    Septic shock (H)    Clinically Significant Risk Factors Present on Admission        # Hypokalemia: Lowest K = 3.2 mmol/L in last 2 days, will replace as needed   # Hyperchloremia: Highest Cl = 108 mmol/L in last 2 days, will monitor as appropriate      # Hypocalcemia: Lowest Ca = 8.3 mg/dL in last 2 days, will monitor and replace as appropriate     # Hypoalbuminemia: Lowest albumin = 3.2 g/dL at 7/22/2025  8:51 PM, will monitor as appropriate  # Drug Induced Coagulation Defect: home medication list includes an anticoagulant medication  # Drug Induced Platelet Defect: home medication list includes an antiplatelet medication   # Hypertension: Noted on problem list  # Chronic heart failure with preserved ejection fraction: heart failure noted on problem list and last echo with EF >50%  # Circulatory Shock: required vasopressors within past 24 hours            # Morbid Obesity: Estimated body mass index is 51.92 kg/m  as calculated from the following:    Height as of this encounter: 1.626 m (5' 4\").    Weight as of this encounter: 137.2 kg (302 lb 8 oz).         # Financial/Environmental Concerns:                     HPI:     49-year-old female with a history of multiple sclerosis not on treatment, obstructive sleep apnea, obesity hypoventilation syndrome, severe pulmonary hypertension, diastolic heart failure, lymphedema of lower extremities, pulmonary embolism, recurrent hospitalizations for sepsis last 1 in May of this year with Pseudomonas.  She woke up today with pain in both legs.  Came to the ED where she was found to be febrile and hypotensive.  She was tachypneic and was started on BiPAP.    Received antibiotics and some IV fluids based on the sepsis protocol        Assessment and plan " :     I have personally reviewed the labs, imaging studies, cultures and discussed the case with referring physician and consulting physicians.     My assessment and plan by system for this patient is as follows:    Neurology/Psychiatry:   Multiple sclerosis not on treatment.  I do not see any neurology note.  MRI with multiple demyelinating lesions    Cardiovascular:   Heart failure with preserved ejection fraction, chronic diastolic.  Severe pulmonary hypertension secondary to heart failure and NARCISA/OHS    Not hypotensive and vasodilated secondary to sepsis.  Minimize IV fluids.  Use norepinephrine to maintain MAP    Hyperdynamic LV with severely dilated RV    Continue Eliquis for prior history of pulmonary embolism    Once pressor needs decrease will restart diuresis    Pulmonary/Ventilator Management:   Acute on chronic hypoxic respiratory failure  Patient does have a history of severe NARCISA and OHS on CPAP with oxygen bleed    Was tachypneic in the emergency room.  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 while on BiPAP  Chronic liver disease with possible cirrhosis and status postcholecystectomy  The above secondary to vascular congestion      Renal/Fluids/Electrolytes:     - monitor function and electrolytes as needed with replacement per ICU protocols. - generally avoid nephrotoxic agents such as NSAID, IV contrast unless specifically required  - adjust medications as needed for renal clearance  - follow I/O's as appropriate.    Infectious Disease:   Sepsis.  Urine positive for leukoesterase and WBC clumps  Urine culture pending    Blood cultures growing Enterococcus faecalis.  She is on vancomycin      Endocrine:   Sliding scale insulin  Plan  - ICU insulin protocol, goal sugar <180      ICU Prophylaxis:   1. DVT:  Apixaban    3. Stress Ulcer: PPI           Medical History:     Past Medical History:   Diagnosis Date    Acute on chronic diastolic congestive heart failure (H) 02/09/2022    Arthritis 2019    Hips    ASCUS favor benign 08/2015    Neg HPV    Deep vein thrombosis (DVT) of left lower extremity (H) 03/25/2025    Depressive disorder 2010    H/O colposcopy with cervical biopsy 09/14/2015    Bx & ECC - negative    Hypertension 2019    LSIL on Pap smear 07/2014    Neg high risk HPV    Multiple sclerosis (H) 08/29/2005 9/18/200pt dx with MS 2004--dx by neurolgist and is followed by Dr. Harris    Myocardial infarction (H)     Obese     Pneumonia due to infectious organism, unspecified laterality, unspecified part of lung 02/08/2022    Sepsis (Temp 101, , WBC 13.0) 10/23/2018    Sepsis, due to unspecified organism, unspecified whether acute organ dysfunction present (H) 03/16/2024    Severe sepsis (H) 05/08/2025    Shingles 10/2016    SIRS (systemic inflammatory response syndrome) (H) 03/04/2021    Sleep apnea     UNSPEC CONSTIPATION 09/18/2006 9/18/2006   Has tried otc suppository and otc lax.      Past Surgical History:   Procedure Laterality Date    CHOLECYSTECTOMY, LAPOROSCOPIC      Cholecystectomy, Laparoscopic    COLONOSCOPY  2007?    Bathroom issue..results normal    COMBINED CYSTOSCOPY, RETROGRADES, EXCHANGE STENT URETER(S) Right 2/21/2022    Procedure: CYSTOSCOPY, WITH right RETROGRADE PYELOGRAM AND right URETERAL STENT PLACEMENT;  Surgeon: Doyle Palomares MD;  Location: [facility]    OPEN REDUCTION INTERNAL FIXATION TOE(S)  4/13/2012    Procedure:OPEN REDUCTION INTERNAL FIXATION TOE(S); Open reduction internal fixation right proximal fifth metatarsal fracture-   Anes-choice block; Surgeon:LEY, JEFFREY DUANE; Location:[facility] OR    PICC SINGLE LUMEN PLACEMENT  3/20/2024    PICC TRIPLE LUMEN PLACEMENT  2/21/2022         PICC TRIPLE LUMEN PLACEMENT  7/22/2025     Social History     Socioeconomic History     Marital status: Single     Spouse name: Not on file    Number of children: Not on file    Years of education: Not on file    Highest education level: Not on file   Occupational History     Employer: [employer]   Tobacco Use    Smoking status: Never    Smokeless tobacco: Never   Vaping Use    Vaping status: Never Used   Substance and Sexual Activity    Alcohol use: Yes     Comment: social    Drug use: No    Sexual activity: Not Currently     Partners: Male     Birth control/protection: Pill   Other Topics Concern    Parent/sibling w/ CABG, MI or angioplasty before 65F 55M? No   Social History Narrative    , 3rd-5th grade     Social Drivers of Health     Financial Resource Strain: Low Risk  (7/22/2025)    Financial Resource Strain     Within the past 12 months, have you or your family members you live with been unable to get utilities (heat, electricity) when it was really needed?: No   Food Insecurity: Low Risk  (7/22/2025)    Food Insecurity     Within the past 12 months, did you worry that your food would run out before you got money to buy more?: No     Within the past 12 months, did the food you bought just not last and you didn t have money to get more?: No   Transportation Needs: Low Risk  (7/22/2025)    Transportation Needs     Within the past 12 months, has lack of transportation kept you from medical appointments, getting your medicines, non-medical meetings or appointments, work, or from getting things that you need?: No   Recent Concern: Transportation Needs - High Risk (5/8/2025)    Transportation Needs     Within the past 12 months, has lack of transportation kept you from medical appointments, getting your medicines, non-medical meetings or appointments, work, or from getting things that you need?: Yes   Physical Activity: Inactive (3/21/2024)    Exercise Vital Sign     Days of Exercise per Week: 0 days     Minutes of Exercise per Session: 0 min   Stress: No Stress  Concern Present (12/4/2020)    [employer] - Occupational Stress Questionnaire     Feeling of Stress : Only a little   Social Connections: Unknown (3/1/2022)    Social Connection and Isolation Panel [NHANES]     Frequency of Communication with Friends and Family: Twice a week     Frequency of Social Gatherings with Friends and Family: Twice a week     Attends Religion Services: Not on file     Active Member of Clubs or Organizations: Not on file     Attends Club or Organization Meetings: Not on file     Marital Status: Not on file   Interpersonal Safety: Low Risk  (7/22/2025)    Interpersonal Safety     Do you feel physically and emotionally safe where you currently live?: Yes     Within the past 12 months, have you been hit, slapped, kicked or otherwise physically hurt by someone?: No     Within the past 12 months, have you been humiliated or emotionally abused in other ways by your partner or ex-partner?: No   Housing Stability: Low Risk  (7/22/2025)    Housing Stability     Do you have housing? : Yes     Are you worried about losing your housing?: No   Recent Concern: Housing Stability - High Risk (5/8/2025)    Housing Stability     Do you have housing? : No     Are you worried about losing your housing?: No      No Known Allergies           Key Medications:     Current Facility-Administered Medications   Medication Dose Route Frequency Provider Last Rate Last Admin    apixaban ANTICOAGULANT (ELIQUIS) tablet 5 mg  5 mg Oral or Feeding Tube BID Fabian Miller MD        [Held by provider] bumetanide (BUMEX) tablet 4 mg  4 mg Oral BID Fabian Miller MD        [START ON 7/24/2025] escitalopram (LEXAPRO) tablet 10 mg  10 mg Oral or Feeding Tube QAM Fabian Miller MD        insulin aspart (NovoLOG) injection (RAPID ACTING)  1-7 Units Subcutaneous Q4H Fabian Miller MD        pantoprazole (PROTONIX) injection 40 mg  40 mg Intravenous Q24H Fabian Miller MD    40 mg at 07/22/25 1724    piperacillin-tazobactam (ZOSYN) 4.5 g vial to attach to  mL bag  4.5 g Intravenous Q6H Fabian Miller MD   4.5 g at 07/23/25 0951    sodium chloride (PF) 0.9% PF flush 10-40 mL  10-40 mL Intracatheter Q7 Days Kristy Salinas, RN   10 mL at 07/22/25 1552    sodium chloride (PF) 0.9% PF flush 10-40 mL  10-40 mL Intracatheter Daily Kristy Salinas, RN   40 mL at 07/23/25 0937    sodium chloride (PF) 0.9% PF flush 3 mL  3 mL Intracatheter Q8H Ravin Guillen DO   3 mL at 07/23/25 1021    [Held by provider] spironolactone (ALDACTONE) tablet 25 mg  25 mg Oral Daily Fabian Miller MD        vancomycin (VANCOCIN) 1,000 mg in 200 mL dextrose intermittent infusion  1,000 mg Intravenous Q12H Fabian Miller  mL/hr at 07/23/25 0653 1,000 mg at 07/23/25 0653     Current Facility-Administered Medications   Medication Dose Route Frequency Provider Last Rate Last Admin    norepinephrine (LEVOPHED) 4 mg in  mL infusion PREMIX  0.01-0.6 mcg/kg/min Intravenous Continuous Fabian Miller MD 29.6 mL/hr at 07/23/25 1200 0.06 mcg/kg/min at 07/23/25 1200    Patient is already receiving anticoagulation with heparin, enoxaparin (LOVENOX), warfarin (COUMADIN)  or other anticoagulant medication   Does not apply Continuous PRN Fabian Miller MD            Home Meds  Current Facility-Administered Medications   Medication Dose Route Frequency Provider Last Rate Last Admin    acetaminophen (TYLENOL) tablet 500 mg  500 mg Oral Q6H PRN Fabian Miller MD        apixaban ANTICOAGULANT (ELIQUIS) tablet 5 mg  5 mg Oral or Feeding Tube BID Fabian Miller MD        [Held by provider] bumetanide (BUMEX) tablet 4 mg  4 mg Oral BID Fabian Miller MD        glucose gel 15-30 g  15-30 g Oral Q15 Min PRN Fabian Miller MD        Or    dextrose 50 % injection 25-50 mL  25-50 mL Intravenous Q15 Min PRN Fabian Miller MD   25 mL at 07/23/25 0026     Or    glucagon injection 1 mg  1 mg Subcutaneous Q15 Min PRN Fabian Miller MD        [START ON 7/24/2025] escitalopram (LEXAPRO) tablet 10 mg  10 mg Oral or Feeding Tube QAM Fabian Miller MD        HYDROmorphone (DILAUDID) injection 0.2 mg  0.2 mg Intravenous Q3H PRN Fabian Miller MD   0.2 mg at 07/23/25 0951    insulin aspart (NovoLOG) injection (RAPID ACTING)  1-7 Units Subcutaneous Q4H Fabian Miller MD        lidocaine (LMX4) cream   Topical Q1H PRN Ravin Guillen DO        lidocaine 1 % 0.1-5 mL  0.1-5 mL Other Q1H PRN Ravin Guillen DO   1 mL at 07/22/25 1440    naloxone (NARCAN) injection 0.2 mg  0.2 mg Intravenous Q2 Min PRN Fabian Miller MD        Or    naloxone (NARCAN) injection 0.4 mg  0.4 mg Intravenous Q2 Min PRN Fabian Miller MD        Or    naloxone (NARCAN) injection 0.2 mg  0.2 mg Intramuscular Q2 Min PRN Fabian Miller MD        Or    naloxone (NARCAN) injection 0.4 mg  0.4 mg Intramuscular Q2 Min PRN Fabian Miller MD        norepinephrine (LEVOPHED) 4 mg in  mL infusion PREMIX  0.01-0.6 mcg/kg/min Intravenous Continuous Fabian Miller MD 29.6 mL/hr at 07/23/25 1200 0.06 mcg/kg/min at 07/23/25 1200    pantoprazole (PROTONIX) injection 40 mg  40 mg Intravenous Q24H Fabian Miller MD   40 mg at 07/22/25 1724    Patient is already receiving anticoagulation with heparin, enoxaparin (LOVENOX), warfarin (COUMADIN)  or other anticoagulant medication   Does not apply Continuous PRN Fabian Miller MD        piperacillin-tazobactam (ZOSYN) 4.5 g vial to attach to  mL bag  4.5 g Intravenous Q6H Fabian Miller MD   4.5 g at 07/23/25 0951    sodium chloride (PF) 0.9% PF flush 10-20 mL  10-20 mL Intracatheter q1 min prn Kristy Salinas RN        sodium chloride (PF) 0.9% PF flush 10-40 mL  10-40 mL Intracatheter Q7 Days Kristy Salinas RN   10 mL at 07/22/25 1552    sodium chloride (PF) 0.9% PF  flush 10-40 mL  10-40 mL Intracatheter Daily Kristy Salinas, RN   40 mL at 07/23/25 0937    sodium chloride (PF) 0.9% PF flush 3 mL  3 mL Intracatheter q1 min prn Ravin Guillen,         sodium chloride (PF) 0.9% PF flush 3 mL  3 mL Intracatheter Q8H Ravin Guillen,    3 mL at 07/23/25 1021    [Held by provider] spironolactone (ALDACTONE) tablet 25 mg  25 mg Oral Daily Fabian Miller MD        vancomycin (VANCOCIN) 1,000 mg in 200 mL dextrose intermittent infusion  1,000 mg Intravenous Q12H Fabian Miller  mL/hr at 07/23/25 0653 1,000 mg at 07/23/25 0653              Physical Examination:   Temp:  [99.1  F (37.3  C)-102.8  F (39.3  C)] 101  F (38.3  C)  Pulse:  [] 85  Resp:  [20-50] 20  BP: ()/(41-67) 106/56  FiO2 (%):  [40 %-75 %] 50 %  SpO2:  [88 %-100 %] 93 %    Intake/Output Summary (Last 24 hours) at 7/22/2025 1525  Last data filed at 7/22/2025 1102  Gross per 24 hour   Intake 100 ml   Output --   Net 100 ml     Wt Readings from Last 4 Encounters:   07/23/25 (!) 137.2 kg (302 lb 8 oz)   07/01/25 118.8 kg (262 lb)   06/16/25 118.4 kg (261 lb)   05/23/25 111 kg (244 lb 12.8 oz)     BP - Mean:  [56-84] 75  FiO2 (%): 50 %, Resp: 20  Recent Labs   Lab 07/22/25 2051 07/22/25  1716 07/22/25  1023 07/22/25  1011   PH  --   --  7.45  --    PCO2  --   --  33*  --    PO2  --   --  174*  --    HCO3  --   --  23  --    O2PER 60 40 50 80       GEN: no acute distress   HEENT: head ncat, sclera anicteric, OP patent, trachea midline   PULM: clear anteriorly    CV/COR: RRR S1S2 no gallop,  No rub, no murmur  ABD: soft nontender, hypoactive bowel sounds, no mass  EXT: Extremities are warm.  Swelling and chronic changes consistent with lymphedema  NEURO: grossly intact  LINES: clean, dry intact         Data:   All data and imaging reviewed     ROUTINE ICU LABS (Last four results)  CMP  Recent Labs   Lab 07/23/25  1102 07/23/25  0801 07/23/25  0552 07/23/25  0551 07/22/25  2102  "07/22/25 2051 07/22/25  1546 07/22/25  1001   NA  --   --  140  --   --  139  --  140   POTASSIUM  --   --  3.2*  --   --  3.8  --  3.8   CHLORIDE  --   --  108*  --   --  104  --  100   CO2  --   --  19*  --   --  18*  --  25   ANIONGAP  --   --  13  --   --  17*  --  15   GLC 74 80 81 85   < > 105*   < > 94   BUN  --   --  22.4*  --   --  22.6*  --  21.7*   CR  --   --  0.98*  --   --  1.08*  --  0.84   GFRESTIMATED  --   --  70  --   --  62  --  84   EVITA  --   --  8.3*  --   --  8.9  --  9.4   MAG  --   --   --   --   --  2.2  --   --    PHOS  --   --   --   --   --  3.5  --   --    PROTTOTAL  --   --   --   --   --  7.0  --  8.2   ALBUMIN  --   --   --   --   --  3.2*  --  4.0   BILITOTAL  --   --   --   --   --  2.7*  --  2.6*   ALKPHOS  --   --   --   --   --  69  --  88   AST  --   --   --   --   --  42  --  17   ALT  --   --   --   --   --  6  --  7    < > = values in this interval not displayed.     CBC  Recent Labs   Lab 07/23/25  0552 07/22/25 2051 07/22/25  1001   WBC 19.1* 17.2* 9.2   RBC 4.49 4.78 4.70   HGB 11.7 12.7 12.5   HCT 37.1 40.9 39.9   MCV 83 86 85   MCH 26.1* 26.6 26.6   MCHC 31.5 31.1* 31.3*   RDW 15.5* 15.8* 15.3*    213 249     INR  Recent Labs   Lab 07/22/25  1001   INR 1.37*     Arterial Blood Gas  Recent Labs   Lab 07/22/25 2051 07/22/25  1716 07/22/25  1023 07/22/25  1011   PH  --   --  7.45  --    PCO2  --   --  33*  --    PO2  --   --  174*  --    HCO3  --   --  23  --    O2PER 60 40 50 80       All cultures:  No results for input(s): \"CULT\" in the last 168 hours.  Recent Results (from the past 24 hours)   XR Chest Port 1 View    Narrative    EXAM: XR CHEST PORT 1 VIEW  LOCATION: [facility] [place]  DATE: 7/22/2025    INDICATION: Dyspnea  COMPARISON: CT 5/12/2025      Impression    IMPRESSION: Stable cardiomegaly and central vascular prominence. Low lung volumes. Stable asymmetric elevation of the right hemidiaphragm. Minimal opacities in the lateral left " lung base likely reflect atelectasis. No pleural effusion.   CT Chest Pulmonary Embolism w Contrast    Narrative    EXAM: CT CHEST PULMONARY EMBOLISM W CONTRAST  LOCATION: [facility]  DATE: 7/22/2025    INDICATION: Septic, hypoxia, concern for possible PNA or PE  COMPARISON: CTA chest 5/12/2025.  TECHNIQUE: CT chest pulmonary angiogram during arterial phase injection of IV contrast. Multiplanar reformats and MIP reconstructions were performed. Dose reduction techniques were used.   CONTRAST: IsoVue 370 90mL    FINDINGS:    ANGIOGRAM CHEST: Main pulmonary trunk is dilated to 4.8 cm, suggestive of chronic pulmonary arterial hypertension. No evidence of pulmonary embolism. A 0.7 cm aneurysm of a lateral segmental pulmonary artery branch within the right middle lobe (4/#143)   is unchanged. Thoracic aorta is normal caliber and negative for aortic dissection.    LUNGS AND PLEURA: No consolidations, pleural effusions, or pneumothorax. Central airways are patent. Scattered punctate calcified granulomas are unchanged. Small intrapulmonary lymph node along the left major fissure is unchanged (6/#157). Central   airways are patent.    MEDIASTINUM/AXILLAE: Mitral annular calcifications. No significant pericardial effusion. No enlarged thoracic lymph node.    CORONARY ARTERY CALCIFICATION: None.    UPPER ABDOMEN: Morphologic changes of chronic liver disease and possible cirrhosis. Cholecystectomy. No acute abnormality.    MUSCULOSKELETAL: Multilevel degenerative changes of the spine. No acute bony abnormality.      Impression    IMPRESSION:    1.  No pulmonary embolism, evidence of pneumonia, or other acute intrathoracic abnormality.    2.  Dilated pulmonary arteries consistent with chronic pulmonary arterial hypertension.    3.  A fusiform 0.7 cm pulmonary artery aneurysm within the right middle lobe is unchanged.         Billing: This patient is critically ill: Yes. Total critical care time today 40  min exclusive of procedures or teaching.

## 2025-07-23 NOTE — PROGRESS NOTES
Patient on/off V60 12/8 60%. Placed on HFNC overnight for intermittent breaks off of bipap on settings of 30L 60%. BS are clear and diminished. Pt went on transport via bipap for CT. Emergency equipment at bedside. No complications. RT will continue to follow.

## 2025-07-23 NOTE — CONSULTS
[facility]  WO Nurse Inpatient Assessment     Consulted for: bilateral legs, bilateral feet, moisture dermatitis everywhere,buttocks purple, small open area in coccyx      Summary:   Chronic skin discoloration to buttocks and within gluteal creases from prolonged recliner chair sitting due to respiratory issues.  BLE: recommend lymphedema consult, left leg warm and red, r/o DVT  Skin folds with MASD, intertrigo, ICD from trapped moisture within folds, rash appearance  Coccyx with intertrigo, skin splitting from moisture related to intermittent incontinence    WO nurse follow-up plan: weekly    Patient History (according to provider note(s):      49-year-old female with a history of multiple sclerosis not on treatment, obstructive sleep apnea, obesity hypoventilation syndrome, severe pulmonary hypertension, diastolic heart failure, lymphedema of lower extremities, pulmonary embolism, recurrent hospitalizations for sepsis last 1 in May of this year with Pseudomonas.  She woke up today with pain in both legs.  Came to the ED where she was found to be febrile and hypotensive.  She was tachypneic and was started on BiPAP.       Assessment:      Areas visualized during today's visit: Focused:, Perineal area, Skin folds , and Sacrum/coccyx    Skin Injury Location: skin folds of pannus and breasts     Right breast 7/23     Left breast 7/23    Right pannus 7/23, left pannus is intact and pink    Last photo: 7/23/25  Skin injury due to: Intertrigo, Irritant contact dermatitis due to friction or contact with other specified body fluids , and Moisture associated skin damage (MASD)  Skin history and plan of care:   skin splitting, rash appearance and sweat and moisture trapping within skin folds.   Affected area:      Skin assessment: Denudement, Rash, and skin splitting     Measurements (length x width x depth, in cm) right pannus: 2.5  x 13  x  0.1 cm , see media for breast skin folds     Color: pink  and red     Temperature  normal      Drainage: scant .      Color: serosanguinous      Odor: mild  Pain: mild, tender and burning  Pain interventions prior to dressing change: patient tolerated well and slow and gentle cares   Treatment goal: Heal , Infection control/prevention, Decrease moisture, Maintain (prevention of deterioration), and Protection  STATUS: initial assessment  Supplies ordered: at bedside, supplies stored on unit, discussed with RN, and discussed with patient       Skin Injury Location: coccyx and lower gluteal      Last photo: 7/23/25  Skin injury due to: Chronic skin discoloration, Chronic skin injury (often related to prolonged recliner use), Incontinence associated dermatitis (IAD), Intertrigo, and Irritant contact dermatitis due to fecal, urinary or dual incontinence   Skin history and plan of care:   patient has intermittent incontinence episodes. recliner chair sleeping every night per patient causing red and purple discoloration that is blanchable. Moisture trapped within gluteal fold. Coccyx with skin splitting, intertrigo. Patient at increased risk for continued skin breakdown related to immobility, incontinence,elevated moisture skin damage, current chronic skin discoloration and skin injury related to prolonged chair sitting.  Affected area:      Skin assessment: Erythema and skin splitting     Measurements (length x width x depth, in cm) open area to coccyx 3.5  x 1  x  0.2 cm      Color: dusky, pink, and red     Temperature  warm     Drainage: scant .      Color: serosanguinous      Odor: mild  Pain: mild, tender  Pain interventions prior to dressing change: patient tolerated well and slow and gentle cares   Treatment goal: Heal , Infection control/prevention, Increase granulation, Decrease moisture, Maintain (prevention of deterioration), and Protection  STATUS: initial assessment  Supplies ordered: at bedside, supplies stored on unit, discussed with RN, and discussed with patient       BLE with chronic lymphedema, redness and warmth to left lower leg, painful to position when turning. Recommending Lymphedema consult. See media    Treatment Plan:     coccyx : BID and PRN  Cleanse the area with Dylan cleanse and protect, very gently with soft cloth.  Apply ostomy powder (#7639) on all open and denuded skin.  Apply thin layer of critic aid paste on top of it.  With repeat application, do not scrub the paste, only remove soiled paste and reapply.  If complete removal of paste is necessary use baby oil/mineral oil (#258070) and soft wash cloth.  Ensure pt has Bariatric Vijay-cushion  while sitting up in the chair.  Use only one Covidien pad in between mattress and pt. No brief while in bed.      Skin folds: breast, groins, pannus  Interdry(order#[id number]):   1.  Wash skin gently. Pat, do not rub.  Dust ostomy powder on open area and wipe off gently,powder will stick to wet skin only.seal in place with cavillon no sting wipe.  2.  With clean scissors, cut enough fabric to cover the affected area, allowing for a minimum of 2 inches to extend beyond the skin fold for moisture evaporation.  3.  Lay a single layer of fabric in the skin fold, placing one edge into the base of the fold. Gently smooth the rest of the fabric over the skin, keeping it flat.  4.  Leave at least 2 inches of fabric exposed outside the skin fold.  5.  Secure the fabric in one of several ways: with the skin fold, with a small amount of skin-friendly tape, or tucked under clothing.  6.  Remove the fabric before bathing and reuse it when finished. When removing, gently separate the skin fold and lift away.  Helpful Hints  1. InterDry  can be written on with a ballpoint pen. It may be helpful to label each piece of InterDry  with the date you started using it.  2.  Each piece of InterDry  may be used up to 5 days, depending on fabric soiling, odor, amount of moisture and general skin condition. Replace InterDry  if it becomes soiled with  "blood, urine or stool.  3.  Do not use creams, ointments, or powders with InterDry  as it may interfere with product efficacy.     Pressure Injury Prevention (PIP) Plan:  If patient is declining pressure injury prevention interventions: Explore reason why and address patient's concerns, Educate on pressure injury risk and prevention intervention(s), If patient is still declining, document \"informed refusal\" , and Ensure Care team is aware ( provider, charge nurse, etc)  Mattress: Follow bed algorithm, add Low Air Loss (Air+) mattress pump if skin is very moist or constantly moist.   HOB: Maintain at or below 30 degrees, unless contraindicated  Repositioning in bed: Every 1-2 hours , Left/right positioning; avoid supine, Raise foot of bed prior to raising head of bed, to reduce patient sliding down (shear), and Frequent microturns using positioning wedges, as patient tolerates  Heels: Keep elevated off mattress, Pillows under calves, and Heel lift boots  Protective Dressing: Sacral Mepilex for prevention (#314710),  especially for the agitated patient   Positioning Equipment:Positioning wedges (#089938) to help maintain 30 degree side lying position   Chair positioning: Chair cushion (#645745)  and Assist patient to reposition hourly   If patient has a buttock pressure injury, or high risk for PI use chair cushion or SPS.  Moisture Management: Perineal cleansing /protection: Follow Incontinence Protocol, Avoid brief in bed, Clean and dry skin folds with bathing , Consider InterDry (#871721) between folds, and Moisturize dry skin  Under Devices: Inspect skin under all medical devices during skin inspection , Ensure tubes are stabilized without tension, and Ensure patient is not lying on medical devices or equipment when repositioned  Ask provider to discontinue device when no longer needed.    Orders: Written    RECOMMEND PRIMARY TEAM ORDER: Lymphedema consult  Education provided: importance of repositioning, plan of " care, wound progress, Infection prevention , Moisture management, Hygiene, and Off-loading pressure  Discussed plan of care with: Patient and Nurse  Notify [facility] if wound(s) deteriorate.  Nursing to notify the Provider(s) and re-consult the [facility] Nurse if new skin concern.    DATA:     Current support surface: Standard  Low air loss (PASCUAL pump, Isolibrium, Pulsate)  Containment of urine/stool: Incontinence Protocol, Incontinent pad in bed, and Indwelling catheter  BMI: Body mass index is 51.92 kg/m .   Active diet order: Orders Placed This Encounter      Low Saturated Fat Na <2400 mg     Output: I/O last 3 completed shifts:  In: 679.71 [I.V.:179.71; IV Piggyback:500]  Out: 1600 [Urine:1600]     Labs:   Recent Labs   Lab 07/23/25  0552 07/22/25 2051 07/22/25  1001   ALBUMIN  --  3.2* 4.0   HGB 11.7 12.7 12.5   INR  --   --  1.37*   WBC 19.1* 17.2* 9.2   A1C  --   --  5.7*     Pressure injury risk assessment:   Sensory Perception: 3-->slightly limited  Moisture: 3-->occasionally moist  Activity: 1-->bedfast  Mobility: 2-->very limited  Nutrition: 2-->probably inadequate  Friction and Shear: 1-->problem  Ben Score: 12    iSrena Francois RN, BSN, CWOCN   Pager no longer is use, please contact through [employer]   [facility] group: [facility]

## 2025-07-23 NOTE — PLAN OF CARE
[facility] - ICU    RN Progress Note:            Pertinent Assessments:      Please refer to flowsheet rows for full assessment     Cardiac monitor showing SR. MAP >65 with use of Levo. Fever of 101.0 this shift, tylenol given. Remains on BiPAP for majority of shift, using HFNC to eat. BiPAP 12/8 45%. HFNC 60L 70%. Regular diet, tolerating. Brar remains in.            Key Events - This Shift:       WOC eval - see orders  Echo completed   Dilaudid ordered for left leg pain      RN Managed Protocols Ordered:  No  Protocols:  PRN'S:  Protocols Status:                 Barriers to Discharge / Downgrade:     ICU level cares including pressors and BiPAP/HFNC.          Point of Contact Update: YES-OR-NO: Yes  If No, reason:    Name:   Phone Number:   Summary of Conversation:

## 2025-07-24 VITALS
TEMPERATURE: 101.2 F | DIASTOLIC BLOOD PRESSURE: 53 MMHG | BODY MASS INDEX: 50.02 KG/M2 | OXYGEN SATURATION: 93 % | WEIGHT: 293 LBS | RESPIRATION RATE: 20 BRPM | HEART RATE: 91 BPM | HEIGHT: 64 IN | SYSTOLIC BLOOD PRESSURE: 98 MMHG

## 2025-07-24 LAB
ANION GAP SERPL CALCULATED.3IONS-SCNC: 12 MMOL/L (ref 7–15)
BACTERIA SPEC CULT: ABNORMAL
BACTERIA SPEC CULT: NORMAL
BACTERIA SPEC CULT: NORMAL
BUN SERPL-MCNC: 21.8 MG/DL (ref 6–20)
CALCIUM SERPL-MCNC: 9.4 MG/DL (ref 8.8–10.4)
CHLORIDE SERPL-SCNC: 105 MMOL/L (ref 98–107)
CREAT SERPL-MCNC: 0.9 MG/DL (ref 0.51–0.95)
CREAT SERPL-MCNC: 1 MG/DL (ref 0.51–0.95)
EGFRCR SERPLBLD CKD-EPI 2021: 68 ML/MIN/1.73M2
EGFRCR SERPLBLD CKD-EPI 2021: 78 ML/MIN/1.73M2
ERYTHROCYTE [DISTWIDTH] IN BLOOD BY AUTOMATED COUNT: 15.7 % (ref 10–15)
ERYTHROCYTE [DISTWIDTH] IN BLOOD BY AUTOMATED COUNT: 15.7 % (ref 10–15)
GLUCOSE BLDC GLUCOMTR-MCNC: 105 MG/DL (ref 70–99)
GLUCOSE BLDC GLUCOMTR-MCNC: 113 MG/DL (ref 70–99)
GLUCOSE BLDC GLUCOMTR-MCNC: 91 MG/DL (ref 70–99)
GLUCOSE SERPL-MCNC: 96 MG/DL (ref 70–99)
HCO3 SERPL-SCNC: 24 MMOL/L (ref 22–29)
HCT VFR BLD AUTO: 34.8 % (ref 35–47)
HCT VFR BLD AUTO: 35.9 % (ref 35–47)
HGB BLD-MCNC: 10.9 G/DL (ref 11.7–15.7)
HGB BLD-MCNC: 11 G/DL (ref 11.7–15.7)
LACTATE SERPL-SCNC: 1.5 MMOL/L (ref 0.7–2)
MAGNESIUM SERPL-MCNC: 2.2 MG/DL (ref 1.7–2.3)
MCH RBC QN AUTO: 25.5 PG (ref 26.5–33)
MCH RBC QN AUTO: 26.3 PG (ref 26.5–33)
MCHC RBC AUTO-ENTMCNC: 30.4 G/DL (ref 31.5–36.5)
MCHC RBC AUTO-ENTMCNC: 31.6 G/DL (ref 31.5–36.5)
MCV RBC AUTO: 83 FL (ref 78–100)
MCV RBC AUTO: 84 FL (ref 78–100)
PLATELET # BLD AUTO: 163 10E3/UL (ref 150–450)
PLATELET # BLD AUTO: 173 10E3/UL (ref 150–450)
POTASSIUM SERPL-SCNC: 3.3 MMOL/L (ref 3.4–5.3)
POTASSIUM SERPL-SCNC: 3.3 MMOL/L (ref 3.4–5.3)
POTASSIUM SERPL-SCNC: 3.4 MMOL/L (ref 3.4–5.3)
RBC # BLD AUTO: 4.18 10E6/UL (ref 3.8–5.2)
RBC # BLD AUTO: 4.27 10E6/UL (ref 3.8–5.2)
SODIUM SERPL-SCNC: 141 MMOL/L (ref 135–145)
VANCOMYCIN SERPL-MCNC: 16.6 UG/ML (ref ?–25)
WBC # BLD AUTO: 14.4 10E3/UL (ref 4–11)
WBC # BLD AUTO: 14.9 10E3/UL (ref 4–11)

## 2025-07-24 PROCEDURE — 999N000157 HC STATISTIC RCP TIME EA 10 MIN

## 2025-07-24 PROCEDURE — 250N000011 HC RX IP 250 OP 636: Performed by: INTERNAL MEDICINE

## 2025-07-24 PROCEDURE — 80202 ASSAY OF VANCOMYCIN: CPT | Performed by: INTERNAL MEDICINE

## 2025-07-24 PROCEDURE — 94660 CPAP INITIATION&MGMT: CPT

## 2025-07-24 PROCEDURE — 250N000013 HC RX MED GY IP 250 OP 250 PS 637: Performed by: NURSE PRACTITIONER

## 2025-07-24 PROCEDURE — 83735 ASSAY OF MAGNESIUM: CPT | Performed by: NURSE PRACTITIONER

## 2025-07-24 PROCEDURE — 250N000011 HC RX IP 250 OP 636: Performed by: STUDENT IN AN ORGANIZED HEALTH CARE EDUCATION/TRAINING PROGRAM

## 2025-07-24 PROCEDURE — 85027 COMPLETE CBC AUTOMATED: CPT | Performed by: STUDENT IN AN ORGANIZED HEALTH CARE EDUCATION/TRAINING PROGRAM

## 2025-07-24 PROCEDURE — 120N000004 HC R&B MS OVERFLOW

## 2025-07-24 PROCEDURE — 250N000013 HC RX MED GY IP 250 OP 250 PS 637: Performed by: INTERNAL MEDICINE

## 2025-07-24 PROCEDURE — 85018 HEMOGLOBIN: CPT | Performed by: NURSE PRACTITIONER

## 2025-07-24 PROCEDURE — 83605 ASSAY OF LACTIC ACID: CPT | Performed by: NURSE PRACTITIONER

## 2025-07-24 PROCEDURE — 99232 SBSQ HOSP IP/OBS MODERATE 35: CPT | Performed by: NURSE PRACTITIONER

## 2025-07-24 PROCEDURE — 82565 ASSAY OF CREATININE: CPT | Performed by: NURSE PRACTITIONER

## 2025-07-24 PROCEDURE — 80048 BASIC METABOLIC PNL TOTAL CA: CPT | Performed by: INTERNAL MEDICINE

## 2025-07-24 PROCEDURE — 250N000013 HC RX MED GY IP 250 OP 250 PS 637: Performed by: STUDENT IN AN ORGANIZED HEALTH CARE EDUCATION/TRAINING PROGRAM

## 2025-07-24 PROCEDURE — 84132 ASSAY OF SERUM POTASSIUM: CPT | Performed by: NURSE PRACTITIONER

## 2025-07-24 RX ORDER — AMPICILLIN INJECTION 2 G/2G
2 POWDER, FOR SOLUTION INTRAMUSCULAR; INTRAVENOUS EVERY 4 HOURS
Status: DISCONTINUED | OUTPATIENT
Start: 2025-07-24 | End: 2025-07-26

## 2025-07-24 RX ORDER — SIMVASTATIN 10 MG
10 TABLET ORAL AT BEDTIME
Status: DISCONTINUED | OUTPATIENT
Start: 2025-07-24 | End: 2025-07-30 | Stop reason: HOSPADM

## 2025-07-24 RX ORDER — MULTIPLE VITAMINS W/ MINERALS TAB 9MG-400MCG
1 TAB ORAL DAILY
Status: DISCONTINUED | OUTPATIENT
Start: 2025-07-25 | End: 2025-07-30 | Stop reason: HOSPADM

## 2025-07-24 RX ORDER — POTASSIUM CHLORIDE 1500 MG/1
40 TABLET, EXTENDED RELEASE ORAL ONCE
Status: COMPLETED | OUTPATIENT
Start: 2025-07-24 | End: 2025-07-24

## 2025-07-24 RX ORDER — BUMETANIDE 2 MG/1
2 TABLET ORAL
Status: DISCONTINUED | OUTPATIENT
Start: 2025-07-24 | End: 2025-07-30 | Stop reason: HOSPADM

## 2025-07-24 RX ORDER — OXYCODONE HYDROCHLORIDE 5 MG/1
10 TABLET ORAL EVERY 4 HOURS PRN
Refills: 0 | Status: DISCONTINUED | OUTPATIENT
Start: 2025-07-24 | End: 2025-07-30 | Stop reason: HOSPADM

## 2025-07-24 RX ORDER — ASPIRIN 81 MG/1
81 TABLET ORAL EVERY MORNING
Status: DISCONTINUED | OUTPATIENT
Start: 2025-07-24 | End: 2025-07-30 | Stop reason: HOSPADM

## 2025-07-24 RX ORDER — LACTOBACILLUS RHAMNOSUS GG 10B CELL
1 CAPSULE ORAL 2 TIMES DAILY
Status: DISCONTINUED | OUTPATIENT
Start: 2025-07-24 | End: 2025-07-30 | Stop reason: HOSPADM

## 2025-07-24 RX ORDER — OXYCODONE HYDROCHLORIDE 5 MG/1
5 TABLET ORAL EVERY 4 HOURS PRN
Refills: 0 | Status: DISCONTINUED | OUTPATIENT
Start: 2025-07-24 | End: 2025-07-30 | Stop reason: HOSPADM

## 2025-07-24 RX ORDER — ONDANSETRON 2 MG/ML
4 INJECTION INTRAMUSCULAR; INTRAVENOUS EVERY 6 HOURS PRN
Status: DISCONTINUED | OUTPATIENT
Start: 2025-07-24 | End: 2025-07-30 | Stop reason: HOSPADM

## 2025-07-24 RX ADMIN — ASPIRIN 81 MG: 81 TABLET, COATED ORAL at 12:55

## 2025-07-24 RX ADMIN — AMPICILLIN SODIUM 2 G: 2 INJECTION, POWDER, FOR SOLUTION INTRAMUSCULAR; INTRAVENOUS at 13:52

## 2025-07-24 RX ADMIN — Medication 1 CAPSULE: at 20:37

## 2025-07-24 RX ADMIN — VANCOMYCIN HYDROCHLORIDE 1000 MG: 1 INJECTION, SOLUTION INTRAVENOUS at 08:51

## 2025-07-24 RX ADMIN — HYDROMORPHONE HYDROCHLORIDE 0.2 MG: 0.2 INJECTION, SOLUTION INTRAMUSCULAR; INTRAVENOUS; SUBCUTANEOUS at 03:42

## 2025-07-24 RX ADMIN — ACETAMINOPHEN 500 MG: 500 TABLET, FILM COATED ORAL at 09:48

## 2025-07-24 RX ADMIN — APIXABAN 5 MG: 5 TABLET, FILM COATED ORAL at 20:37

## 2025-07-24 RX ADMIN — PIPERACILLIN AND TAZOBACTAM 4.5 G: 4; .5 INJECTION, POWDER, FOR SOLUTION INTRAVENOUS at 03:42

## 2025-07-24 RX ADMIN — PIPERACILLIN AND TAZOBACTAM 4.5 G: 4; .5 INJECTION, POWDER, FOR SOLUTION INTRAVENOUS at 09:48

## 2025-07-24 RX ADMIN — AMPICILLIN SODIUM 2 G: 2 INJECTION, POWDER, FOR SOLUTION INTRAMUSCULAR; INTRAVENOUS at 16:35

## 2025-07-24 RX ADMIN — BUMETANIDE 2 MG: 2 TABLET ORAL at 10:39

## 2025-07-24 RX ADMIN — POTASSIUM CHLORIDE 40 MEQ: 1500 TABLET, EXTENDED RELEASE ORAL at 16:08

## 2025-07-24 RX ADMIN — POTASSIUM CHLORIDE 40 MEQ: 1500 TABLET, EXTENDED RELEASE ORAL at 21:30

## 2025-07-24 RX ADMIN — APIXABAN 5 MG: 5 TABLET, FILM COATED ORAL at 08:52

## 2025-07-24 RX ADMIN — ESCITALOPRAM OXALATE 10 MG: 10 TABLET ORAL at 08:52

## 2025-07-24 RX ADMIN — SIMVASTATIN 10 MG: 10 TABLET, FILM COATED ORAL at 20:37

## 2025-07-24 RX ADMIN — BUMETANIDE 2 MG: 2 TABLET ORAL at 18:19

## 2025-07-24 RX ADMIN — AMPICILLIN SODIUM 2 G: 2 INJECTION, POWDER, FOR SOLUTION INTRAMUSCULAR; INTRAVENOUS at 20:51

## 2025-07-24 RX ADMIN — OXYCODONE HYDROCHLORIDE 10 MG: 5 TABLET ORAL at 22:38

## 2025-07-24 RX ADMIN — POTASSIUM CHLORIDE 40 MEQ: 1500 TABLET, EXTENDED RELEASE ORAL at 08:52

## 2025-07-24 ASSESSMENT — ACTIVITIES OF DAILY LIVING (ADL)
ADLS_ACUITY_SCORE: 65
ADLS_ACUITY_SCORE: 60
ADLS_ACUITY_SCORE: 65
ADLS_ACUITY_SCORE: 58
ADLS_ACUITY_SCORE: 60
ADLS_ACUITY_SCORE: 65
ADLS_ACUITY_SCORE: 60
ADLS_ACUITY_SCORE: 62
ADLS_ACUITY_SCORE: 60
ADLS_ACUITY_SCORE: 65
ADLS_ACUITY_SCORE: 60

## 2025-07-24 NOTE — INTERIM SUMMARY
Hospitalist note  Took a verbal sign out as patient is ready to downgrade from ICU. Abx adjusted per culture results. PT/OT orders placed. I discussed management plan with the patient at the bedside.     Leigh Hardy MD

## 2025-07-24 NOTE — PLAN OF CARE
Goal Outcome Evaluation:      Plan of Care Reviewed With: patient    Overall Patient Progress: improvingOverall Patient Progress: improving    Outcome Evaluation: MAP>65. Weaned off Levophed gtt by 05:15    [facility] - ICU    RN Progress Note:            Pertinent Assessments:      Please refer to flowsheet rows for full assessment                Key Events - This Shift:     Repositioning every 2 hours is very challenging as patient refused sometimes due to left leg discomfort. Has to premedicate with Dilaudid during cares.     Mentioned low Potasium with yesterday's blood works with Dr. Salas during midnight rounds. No order for Protocol nor repeat BMP today.     RN Managed Protocols Ordered:  No  Protocols:  PRN'S:  Protocols Status: N/A                Barriers to Discharge / Downgrade:     May downgrade if maintains MAP>65 and improved oxygen needs. Currently trialing oxymask at 4lpm from BIPAP break.            Problem: Skin Injury Risk Increased  Goal: Skin Health and Integrity  Outcome: Progressing  Intervention: Plan: Nurse Driven Intervention: Moisture Management  Recent Flowsheet Documentation  Taken 7/24/2025 0400 by María Kelley RN  Moisture Interventions:   Urinary collection device   Barrier ointment (CriticAid, Triad paste)   Skin sealant (Cavilon Advanced)   Wicking textile in skin fold (InterDry)  Taken 7/24/2025 0000 by María Kelley RN  Moisture Interventions:   Urinary collection device   Barrier ointment (CriticAid, Triad paste)   Skin sealant (Cavilon Advanced)   Wicking textile in skin fold (InterDry)  Intervention: Plan: Nurse Driven Intervention: Friction and Shear  Recent Flowsheet Documentation  Taken 7/24/2025 0400 by María Kelley RN  Friction/Shear Interventions:   HOB 30 degrees or less   Silicone foam sacral dressing   Repositioning device (TAP system, etc.)  Taken 7/24/2025 0000 by María Kelley RN  Friction/Shear  Interventions:   HOB 30 degrees or less   Silicone foam sacral dressing   Repositioning device (TAP system, etc.)  Intervention: Optimize Skin Protection  Recent Flowsheet Documentation  Taken 7/24/2025 0400 by María Kelley RN  Activity Management:   activity encouraged   activity adjusted per tolerance   patient refuses activity  Head of Bed (HOB) Positioning: HOB at 20-30 degrees  Taken 7/24/2025 0000 by María Kelley RN  Activity Management:   activity encouraged   activity adjusted per tolerance   patient refuses activity  Head of Bed (HOB) Positioning: HOB at 20-30 degrees     Problem: Gas Exchange Impaired  Goal: Optimal Gas Exchange  Outcome: Progressing  Intervention: Optimize Oxygenation and Ventilation  Recent Flowsheet Documentation  Taken 7/24/2025 0400 by María Kelley RN  Head of Bed (HOB) Positioning: HOB at 20-30 degrees  Taken 7/24/2025 0000 by María Kelley RN  Head of Bed (HOB) Positioning: HOB at 20-30 degrees     Problem: Pain Acute  Goal: Optimal Pain Control and Function  Outcome: Progressing  Intervention: Develop Pain Management Plan  Recent Flowsheet Documentation  Taken 7/24/2025 0342 by María Kelley RN  Pain Management Interventions:   medication (see MAR)   emotional support   repositioned   rest  Taken 7/23/2025 2311 by María Kelley RN  Pain Management Interventions: medication offered but refused  Intervention: Prevent or Manage Pain  Recent Flowsheet Documentation  Taken 7/24/2025 0400 by María Kelley RN  Medication Review/Management:   medications reviewed   high-risk medications identified   infusion titrated  Taken 7/24/2025 0000 by María Kelley RN  Medication Review/Management:   medications reviewed   high-risk medications identified   infusion titrated     Problem: Sepsis/Septic Shock  Goal: Absence of Infection Signs and Symptoms  Outcome: Progressing  Intervention: Promote Recovery  Recent Flowsheet  Documentation  Taken 7/24/2025 0400 by María Kelley, RN  Activity Management:   activity encouraged   activity adjusted per tolerance   patient refuses activity  Taken 7/24/2025 0000 by María Kelley, RN  Activity Management:   activity encouraged   activity adjusted per tolerance   patient refuses activity

## 2025-07-24 NOTE — PHARMACY-VANCOMYCIN DOSING SERVICE
Pharmacy Vancomycin Note  Date of Service 2025  Patient's  1975   49 year old, female    Indication: Bacteremia and Sepsis  Day of Therapy: 3  Current vancomycin regimen:  1000 mg IV q12h  Current vancomycin monitoring method: AUC  Current vancomycin therapeutic monitoring goal: 400-600 mg*h/L    InsightRX Prediction of Current Vancomycin Regimen  Regimen: 1000 mg IV every 12 hours.  Start time: 20:51 on 2025  Exposure target: AUC24 (range) 400-600 mg/L.hr   AUC24,ss: 451 mg/L.hr  Probability of AUC24 > 400: 95 %  Ctrough,ss: 12.4 mg/L  Probability of Ctrough,ss > 20: 5 %  Probability of nephrotoxicity (Lodise ARMANDO ): 8 %      Current estimated CrCl = Estimated Creatinine Clearance: 103.7 mL/min (based on SCr of 0.9 mg/dL).    Creatinine for last 3 days  2025: 10:01 AM Creatinine 0.84 mg/dL;  8:51 PM Creatinine 1.08 mg/dL  2025:  5:52 AM Creatinine 0.98 mg/dL  2025:  4:14 AM Creatinine 1.00 mg/dL;  7:37 AM Creatinine 0.90 mg/dL    Recent Vancomycin Levels (past 3 days)  2025:  4:14 AM Vancomycin 16.6 ug/mL    Vancomycin IV Administrations (past 72 hours)                     vancomycin (VANCOCIN) 1,000 mg in 200 mL dextrose intermittent infusion (mg) 1,000 mg New Bag 25 0851     1,000 mg New Bag 25     1,000 mg New Bag  0653    vancomycin (VANCOCIN) 2,500 mg in sodium chloride 0.9 % 500 mL intermittent infusion (mg) 2,500 mg New Bag 25 1101                    Nephrotoxins and other renal medications (From now, onward)      Start     Dose/Rate Route Frequency Ordered Stop    25 1100  bumetanide (BUMEX) tablet 2 mg        Note to Pharmacy: PTA Sig:Take 4 mg by mouth 2 times daily.      2 mg Oral 2 TIMES DAILY (Diuretics and Nitrates) 25 1035      25 0600  vancomycin (VANCOCIN) 1,000 mg in 200 mL dextrose intermittent infusion         1,000 mg  200 mL/hr over 1 Hours Intravenous EVERY 12 HOURS 25 1508      25 6266   piperacillin-tazobactam (ZOSYN) 4.5 g vial to attach to  mL bag         4.5 g  over 30 Minutes Intravenous EVERY 6 HOURS 07/22/25 1438                 Contrast Orders - past 72 hours (72h ago, onward)      Start     Dose/Rate Route Frequency Stop    07/23/25 1130  perflutren lipid microsphere (DEFINITY) injection SUSP 2 mL         2 mL Intravenous ONCE 07/23/25 1124    07/22/25 1400  iopamidol (ISOVUE-370) solution 90 mL         90 mL Intravenous ONCE 07/22/25 1351            Interpretation of levels and current regimen:  Vancomycin level is reflective of -600    Has serum creatinine changed greater than 50% in last 72 hours: No    Urine output:  good urine output    InsightRX Prediction of Planned New Vancomycin Regimen  Regimen: 1000 mg IV every 12 hours.  Start time: 20:51 on 07/24/2025  Exposure target: AUC24 (range) 400-600 mg/L.hr   AUC24,ss: 451 mg/L.hr  Probability of AUC24 > 400: 95 %  Ctrough,ss: 12.4 mg/L  Probability of Ctrough,ss > 20: 5 %  Probability of nephrotoxicity (Lodise ARMANDO 2009): 8 %      Plan:  Continue Current Dose  Vancomycin monitoring method: AUC  Vancomycin therapeutic monitoring goal: 400-600 mg*h/L  Pharmacy will check vancomycin levels as appropriate in 3-5 Days.  Serum creatinine levels already in place for tomorrow.    Charline Tapia, [facility]

## 2025-07-24 NOTE — PLAN OF CARE
Problem: Adult Inpatient Plan of Care  Goal: Optimal Comfort and Wellbeing  Outcome: Progressing     Problem: Skin Injury Risk Increased  Goal: Skin Health and Integrity  Outcome: Progressing     Problem: Gas Exchange Impaired  Goal: Optimal Gas Exchange  Outcome: Progressing     Problem: Fluid Volume Excess  Goal: Fluid Balance  Outcome: Progressing     Problem: Pain Acute  Goal: Optimal Pain Control and Function  Outcome: Progressing     Problem: Sepsis/Septic Shock  Goal: Absence of Infection Signs and Symptoms  Outcome: Progressing     Pt transferred from ICU at 1700. A/Ox4. 7L 02 oxymask to maintain sats >90% per orders. Primofit in place. VSS. LAZARO babb 2008. Plan of care continues.

## 2025-07-24 NOTE — PROGRESS NOTES
"Critical Care Progress Note      07/24/2025    Name: Bess Enamorado MRN#: [id number]   Age: 49 year old YOB: 1975                    Problem List:   Active Problems:    Acute respiratory failure with hypoxia (H)    Septic shock (H)    Clinically Significant Risk Factors        # Hypokalemia: Lowest K = 3.2 mmol/L in last 2 days, will replace as needed   # Hyperchloremia: Highest Cl = 108 mmol/L in last 2 days, will monitor as appropriate      # Hypocalcemia: Lowest Ca = 8.3 mg/dL in last 2 days, will monitor and replace as appropriate     # Hypoalbuminemia: Lowest albumin = 3.2 g/dL at 7/22/2025  8:51 PM, will monitor as appropriate  # Coagulation Defect: INR = 1.37 (Ref range: 0.85 - 1.15) and/or PTT = 35 Seconds (Ref range: 22 - 38 Seconds), will monitor for bleeding    # Hypertension: Noted on problem list  # Chronic heart failure with preserved ejection fraction: heart failure noted on problem list and last echo with EF >50%           # Morbid Obesity: Estimated body mass index is 52.03 kg/m  as calculated from the following:    Height as of this encounter: 1.626 m (5' 4\").    Weight as of this encounter: 137.5 kg (303 lb 2.1 oz)., PRESENT ON ADMISSION       # Financial/Environmental Concerns:                      HPI:     49-year-old female with a history of multiple sclerosis not on treatment, obstructive sleep apnea, obesity hypoventilation syndrome, severe pulmonary hypertension, diastolic heart failure, lymphedema of lower extremities, pulmonary embolism, recurrent hospitalizations for sepsis last 1 in May of this year with Pseudomonas.  She woke up today with pain in both legs.  Came to the ED where she was found to be febrile and hypotensive.  She was tachypneic and was started on BiPAP.    Received antibiotics and some IV fluids based on the sepsis protocol. Blood cultures positive for Enterococcus.         Assessment and plan :     I have personally reviewed the labs, imaging studies, " cultures and discussed the case with referring physician and consulting physicians.     My assessment and plan by system for this patient is as follows:    Neurology/Psychiatry:   Multiple sclerosis not on treatment.    MRI with multiple demyelinating lesions  Acute pain -receiving fairly frequent dosing of hydromorphone.    * discontinue hydromorphone    * oxycodone PRN pain     Cardiovascular:   Heart failure with preserved ejection fraction, chronic diastolic failure.  Severe pulmonary hypertension secondary to heart failure and NARCISA/OHS  Septic shock - resolved    Hyperdynamic LV with severely dilated RV    Continue Eliquis for prior history of pulmonary embolism    Off norepi since early this morning.   Resume home bumex at 2mg BID (home dose 4mg)    Pulmonary/Ventilator Management:   Acute on chronic hypoxic respiratory failure  Patient does have a history of severe NARCISA and OHS on Auto-PAP 10-15 with oxygen bleed PTA    Was tachypneic in the emergency room.  Started on BiPAP for work of breathing with improvement  Using BiPAP 14/8 with sleep. She will ask her family to bring in her home unit.    Down to Oxymask 4L.     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 - generally avoid nephrotoxic agents such as NSAID, IV contrast unless specifically required  - adjust medications as needed for renal clearance  - follow I/O's as appropriate.  - Creatinine back down to baseline; resume diuretics.     Infectious Disease:   Sepsis.  Urine positive for leukoesterase and WBC clumps  Urine culture pending    Blood cultures growing Enterococcus faecalis.  She  is on vancomycin      Endocrine:   Sliding scale insulin - has not required coverage.   Stop sliding scale insulin          ICU Prophylaxis:   1. DVT: Apixaban    3. Stress Ulcer: PPI - discontinue. Not on at home and now taking diet.     Patient is stable for transfer out of ICU. [facility] [place] consulted. Our service will sign off.     Alanis Brar, CNP  [facility] Pulmonary/Critical Care            Medical History:     Past Medical History:   Diagnosis Date    Acute on chronic diastolic congestive heart failure (H) 02/09/2022    Arthritis 2019    Hips    ASCUS favor benign 08/2015    Neg HPV    Deep vein thrombosis (DVT) of left lower extremity (H) 03/25/2025    Depressive disorder 2010    H/O colposcopy with cervical biopsy 09/14/2015    Bx & ECC - negative    Hypertension 2019    LSIL on Pap smear 07/2014    Neg high risk HPV    Multiple sclerosis (H) 08/29/2005 9/18/200pt dx with MS 2004--dx by neurolgist and is followed by Dr. Harris    Myocardial infarction (H)     Obese     Pneumonia due to infectious organism, unspecified laterality, unspecified part of lung 02/08/2022    Sepsis (Temp 101, , WBC 13.0) 10/23/2018    Sepsis, due to unspecified organism, unspecified whether acute organ dysfunction present (H) 03/16/2024    Severe sepsis (H) 05/08/2025    Shingles 10/2016    SIRS (systemic inflammatory response syndrome) (H) 03/04/2021    Sleep apnea     UNSPEC CONSTIPATION 09/18/2006 9/18/2006   Has tried otc suppository and otc lax.      Past Surgical History:   Procedure Laterality Date    CHOLECYSTECTOMY, LAPOROSCOPIC      Cholecystectomy, Laparoscopic    COLONOSCOPY  2007?    Bathroom issue..results normal    COMBINED CYSTOSCOPY, RETROGRADES, EXCHANGE STENT URETER(S) Right 2/21/2022    Procedure: CYSTOSCOPY, WITH right RETROGRADE PYELOGRAM AND right URETERAL STENT PLACEMENT;  Surgeon: Doyle Palomares MD;  Location: [facility] OR    OPEN REDUCTION INTERNAL FIXATION TOE(S)  4/13/2012     Procedure:OPEN REDUCTION INTERNAL FIXATION TOE(S); Open reduction internal fixation right proximal fifth metatarsal fracture-   Anes-choice block; Surgeon:LEY, JEFFREY DUANE; Location:[place] OR    PICC SINGLE LUMEN PLACEMENT  3/20/2024    PICC TRIPLE LUMEN PLACEMENT  2/21/2022         PICC TRIPLE LUMEN PLACEMENT  7/22/2025             Medications: reviewed              Physical Examination:   Temp:  [98  F (36.7  C)-101  F (38.3  C)] 100  F (37.8  C)  Pulse:  [] 92  Resp:  [15-35] 25  BP: ()/(49-68) 105/65  FiO2 (%):  [25 %-70 %] 25 %  SpO2:  [92 %-100 %] 93 %      Intake/Output Summary (Last 24 hours) at 7/24/2025 1056  Last data filed at 7/24/2025 0800  Gross per 24 hour   Intake 2360.48 ml   Output 1435 ml   Net 925.48 ml         BP - Mean:  [64-82] 78  FiO2 (%): 25 %, Resp: 25  Recent Labs   Lab 07/22/25 2051 07/22/25  1716 07/22/25  1023 07/22/25  1011   PH  --   --  7.45  --    PCO2  --   --  33*  --    PO2  --   --  174*  --    HCO3  --   --  23  --    O2PER 60 40 50 80       GEN: no acute distress, awake.    HEENT: AT/NC  PULM: unlabored on Oxymask; clear anteriorly    CV/COR: RRR S1S2 no gallop,  No rub, no murmur  ABD: soft nontender, hypoactive bowel sounds, no mass  EXT: Extremities are warm.  Swelling and chronic changes consistent with lymphedema  NEURO: grossly intact  Pysch: calm         Data:   All data and imaging reviewed     ROUTINE ICU LABS (Last four results)  CMP  Recent Labs   Lab 07/24/25  0737 07/24/25  0736 07/24/25  0414 07/23/25  2358 07/23/25  0801 07/23/25  0552 07/22/25  2102 07/22/25  2051 07/22/25  1546 07/22/25  1001     --   --   --   --  140  --  139  --  140   POTASSIUM 3.4  --   --   --   --  3.2*  --  3.8  --  3.8   CHLORIDE 105  --   --   --   --  108*  --  104  --  100   CO2 24  --   --   --   --  19*  --  18*  --  25   ANIONGAP 12  --   --   --   --  13  --  17*  --  15   GLC 96 91 105* 113*   < > 81   < > 105*   < > 94   BUN 21.8*  --   --   --   --  22.4*   "--  22.6*  --  21.7*   CR 0.90  --  1.00*  --   --  0.98*  --  1.08*  --  0.84   GFRESTIMATED 78  --  68  --   --  70  --  62  --  84   EVITA 9.4  --   --   --   --  8.3*  --  8.9  --  9.4   MAG 2.2  --   --   --   --   --   --  2.2  --   --    PHOS  --   --   --   --   --   --   --  3.5  --   --    PROTTOTAL  --   --   --   --   --   --   --  7.0  --  8.2   ALBUMIN  --   --   --   --   --   --   --  3.2*  --  4.0   BILITOTAL  --   --   --   --   --   --   --  2.7*  --  2.6*   ALKPHOS  --   --   --   --   --   --   --  69  --  88   AST  --   --   --   --   --   --   --  42  --  17   ALT  --   --   --   --   --   --   --  6  --  7    < > = values in this interval not displayed.     CBC  Recent Labs   Lab 07/24/25  0737 07/23/25  0552 07/22/25 2051 07/22/25  1001   WBC 14.9* 19.1* 17.2* 9.2   RBC 4.27 4.49 4.78 4.70   HGB 10.9* 11.7 12.7 12.5   HCT 35.9 37.1 40.9 39.9   MCV 84 83 86 85   MCH 25.5* 26.1* 26.6 26.6   MCHC 30.4* 31.5 31.1* 31.3*   RDW 15.7* 15.5* 15.8* 15.3*    182 213 249     INR  Recent Labs   Lab 07/22/25  1001   INR 1.37*     Arterial Blood Gas  Recent Labs   Lab 07/22/25 2051 07/22/25  1716 07/22/25  1023 07/22/25  1011   PH  --   --  7.45  --    PCO2  --   --  33*  --    PO2  --   --  174*  --    HCO3  --   --  23  --    O2PER 60 40 50 80       All cultures:  No results for input(s): \"CULT\" in the last 168 hours.  Recent Results (from the past 24 hours)   XR Chest Port 1 View    Narrative    EXAM: XR CHEST PORT 1 VIEW  LOCATION: [facility]  DATE: 7/22/2025    INDICATION: Dyspnea  COMPARISON: CT 5/12/2025      Impression    IMPRESSION: Stable cardiomegaly and central vascular prominence. Low lung volumes. Stable asymmetric elevation of the right hemidiaphragm. Minimal opacities in the lateral left lung base likely reflect atelectasis. No pleural effusion.   CT Chest Pulmonary Embolism w Contrast    Narrative    EXAM: CT CHEST PULMONARY EMBOLISM W CONTRAST  LOCATION: " [facility]  DATE: 7/22/2025    INDICATION: Septic, hypoxia, concern for possible PNA or PE  COMPARISON: CTA chest 5/12/2025.  TECHNIQUE: CT chest pulmonary angiogram during arterial phase injection of IV contrast. Multiplanar reformats and MIP reconstructions were performed. Dose reduction techniques were used.   CONTRAST: IsoVue 370 90mL    FINDINGS:    ANGIOGRAM CHEST: Main pulmonary trunk is dilated to 4.8 cm, suggestive of chronic pulmonary arterial hypertension. No evidence of pulmonary embolism. A 0.7 cm aneurysm of a lateral segmental pulmonary artery branch within the right middle lobe (4/#143)   is unchanged. Thoracic aorta is normal caliber and negative for aortic dissection.    LUNGS AND PLEURA: No consolidations, pleural effusions, or pneumothorax. Central airways are patent. Scattered punctate calcified granulomas are unchanged. Small intrapulmonary lymph node along the left major fissure is unchanged (6/#157). Central   airways are patent.    MEDIASTINUM/AXILLAE: Mitral annular calcifications. No significant pericardial effusion. No enlarged thoracic lymph node.    CORONARY ARTERY CALCIFICATION: None.    UPPER ABDOMEN: Morphologic changes of chronic liver disease and possible cirrhosis. Cholecystectomy. No acute abnormality.    MUSCULOSKELETAL: Multilevel degenerative changes of the spine. No acute bony abnormality.      Impression    IMPRESSION:    1.  No pulmonary embolism, evidence of pneumonia, or other acute intrathoracic abnormality.    2.  Dilated pulmonary arteries consistent with chronic pulmonary arterial hypertension.    3.  A fusiform 0.7 cm pulmonary artery aneurysm within the right middle lobe is unchanged.

## 2025-07-24 NOTE — PROGRESS NOTES
"Care Management Follow Up    Length of Stay (days): 2    Expected Discharge Date: 07/28/2025    Anticipated Discharge Plan:   TBD     Transportation: TBD    PT Recommendations:  No Consult placed at this time.   OT Recommendations:  No Consult placed at this time.      Barriers to Discharge: medical stability/ Was on Bipap and now on 4 Liters oxymask.     Prior Living Situation:   \"Lives alone and usually has homecare post hospitalization. Has HX of MS. Most recent admit [facility]. Pt will likely need home w/homecare at discharge or TCU placement. Pt Ind with W/c at home.Dependent on cleaning, laundry and transport. Pt has a grab bar on toilet, walker rolling and manual w/c. Pt also has oxygen at home if needed and Inc supplies. Pt had home RN and PT , but no name of company noted. \"    Discussed  Partnership in Safe Discharge Planning  document with patient/family: No     Handoff Completed: Yes, [facility] PCP: Internal handoff referral completed    Patient/Spokesperson Updated: No    Additional Information:  RNCM called [facility] to see what home care agency pt had when leaving TCU. [facility] home care RN/PT is what pt had.   Facesheet sent to [facility] pending.         Next Steps: Follow up with [facility] to make sure pt is open to services. Follow any therapy recs once placed. CM will continue to monitor progression of care, review team recommendations and provide discharge planning assist as needed.       Cyndie Nolen RN  Acute Care Management  [facility]  For further questions/concerns you can reach us at Vocera Web Console    "

## 2025-07-24 NOTE — PLAN OF CARE
Problem: Skin Injury Risk Increased  Goal: Skin Health and Integrity  7/23/2025 1920 by Stella Vogel RN  Outcome: Progressing  7/23/2025 1920 by Stella Vogel RN  Outcome: Progressing  Intervention: Plan: Nurse Driven Intervention: Moisture Management  Recent Flowsheet Documentation  Taken 7/23/2025 1600 by Stella Vogel RN  Moisture Interventions:   Perineal cleanser   Urinary collection device  Intervention: Plan: Nurse Driven Intervention: Friction and Shear  Recent Flowsheet Documentation  Taken 7/23/2025 1600 by Stella Vogel RN  Friction/Shear Interventions:   HOB 30 degrees or less   Silicone foam sacral dressing   Assistive lifting device (portable/ceiling lift, etc.)   Repositioning device (TAP system, etc.)  Intervention: Optimize Skin Protection  Recent Flowsheet Documentation  Taken 7/23/2025 1818 by Stella Vogel RN  Head of Bed (HOB) Positioning: HOB at 20-30 degrees  Taken 7/23/2025 1630 by Stella Vogel RN  Activity Management: bedrest  Head of Bed (HOB) Positioning: HOB at 30 degrees  Taken 7/23/2025 1600 by Stella Vogel RN  Activity Management: bedrest     Problem: Pain Acute  Goal: Optimal Pain Control and Function  Outcome: Progressing  Intervention: Develop Pain Management Plan  Recent Flowsheet Documentation  Taken 7/23/2025 1630 by Stella Vogel RN  Pain Management Interventions: repositioned  Taken 7/23/2025 1601 by Stella Vogel RN  Pain Management Interventions: medication (see MAR)  Intervention: Prevent or Manage Pain  Recent Flowsheet Documentation  Taken 7/23/2025 1600 by Stella Vogel RN  Medication Review/Management: medications reviewed   Goal Outcome Evaluation:  Pt is alert and orient x 4, moving all extremities. Turn and reposition every two hours.  BIPAP at 45 % fio2. PRN Dilaudid was given once for left leg pain. Levophed at 0.03 mcg to maintain MAP > 65. Will continue to monitor.

## 2025-07-24 NOTE — PLAN OF CARE
Goal Outcome Evaluation:      Plan of Care Reviewed With: patient    Overall Patient Progress: improvingOverall Patient Progress: improving    Outcome Evaluation: Blood pressure stable off of Levophed, voiding after Brar catheter removed, up in chair briefly.    [facility] - ICU    RN Progress Note:            Pertinent Assessments:      Please refer to flowsheet rows for full assessment     Difficult transfer to chair with assist of walker, gait belt and 3 people but patient was able to get out of bed briefly.  Vital signs were stable and patient denied pain.  Oxygen saturation mid to upper 90's on 5L/nasal cannula.  Able to void after Brar catheter removed.           Key Events - This Shift:       Patient now med tele status.     RN Managed Protocols Ordered:  Yes  Protocols:Potassium and Magnesium  Protocols Status: Reviewed with Oncoming RN                Barriers to Discharge / Downgrade:              Point of Contact Update: YES-OR-NO: Yes  Patient's brother updated in room.

## 2025-07-24 NOTE — PROGRESS NOTES
"CLINICAL NUTRITION SERVICES - ASSESSMENT NOTE    RECOMMENDATIONS FOR MDs/PROVIDERS TO ORDER:      Registered Dietitian Interventions:  Expedite daily - pt willing to drink down plain and follow with beverage  Discussed eating a balanced diet with plenty of protein for healing  Multivitamin with minerals per staff recommendation    Future/Additional Recommendations:  Will monitor wound healing per WOC     REASON FOR ASSESSMENT  wounds    Per chart, 49-year-old female with a history of multiple sclerosis not on treatment, obstructive sleep apnea, obesity hypoventilation syndrome, severe pulmonary hypertension, diastolic heart failure, lymphedema of lower extremities, pulmonary embolism, recurrent hospitalizations for sepsis last 1 in May of this year with Pseudomonas.  Eating disorder dx 2013  She woke up today with pain in both legs.  Came to the ED where she was found to be febrile and hypotensive.  She was tachypneic and was started on BiPAP.    INFORMATION OBTAINED  Assessed patient in room.  Pt eating orange slices when writer visited.    NUTRITION HISTORY  Pt reports eating well prior to coming to hospital    CURRENT NUTRITION ORDERS  Diet: Low Saturated Fat/2400 mg Sodium started yesterday, mid morning      CURRENT INTAKE/TOLERANCE  Pt on high flow oxygen when eating  Per flowsheets fair appetite,   Pt ordered and omelet and fruit for breakfast today  Yesterday 75 % of dinner fruit, yogurt, sherbet  ~228 calories, 8 gm protein,   Fruit for breakfast    LABS  Nutrition-relevant labs: reviewed    MEDICATIONS  Nutrition-relevant medications: IV abx, zocor, Kcl    ANTHROPOMETRICS  Height: 162.6 cm (5' 4\")  Admission Weight: 131.5 kg (290 lb) (07/22/25 0951)   Most Recent Weight: (!) 137.5 kg (303 lb 2.1 oz) (07/24/25 0400)  IBW: 54.5 kg  BMI: Body mass index is 52.03 kg/m .   Weight History:   Wt Readings from Last 10 Encounters:   07/24/25 (!) 137.5 kg (303 lb 2.1 oz)   07/01/25 118.8 kg (262 lb)   06/16/25 118.4 " kg (261 lb)   05/23/25 111 kg (244 lb 12.8 oz)   05/21/25 109 kg (240 lb 6.4 oz)   05/19/25 107.4 kg (236 lb 12.8 oz)   04/15/25 116.9 kg (257 lb 12.8 oz)   04/03/25 123.4 kg (272 lb 0.8 oz)   04/01/25 115.2 kg (254 lb)   03/28/25 118.8 kg (262 lb)      Dosing Weight: 75.3 kg, based on adjusted wt    ASSESSED NUTRITION NEEDS  Estimated Energy Needs: 1890 -2260 kcals/day (25 - 30 kcals/kg)  Justification: Obese and Wound healing  Estimated Protein Needs: 76 - 91+ grams protein/day (1 - 1.2 grams of pro/kg)  Justification: Wound healing  Estimated Fluid Needs: 1890 -2260 mL/day (25 - 30 mL/kg), or per Provider  Justification: Maintenance    SYSTEM AND PHYSICAL FINDINGS    Per chart, +4 generalized, +2 hands  GI symptoms: audible BS  Skin/wounds: Pressure injury medial coccyx, hip fissure, breast moisture damage, Pressure injury sacrum. Woc following 7/23/25, recommending lymphedema consult.    MALNUTRITION  % Intake: Decreased intake does not meet criteria  % Weight Loss: None noted  Subcutaneous Fat Loss: None observed  Muscle Loss: None observed  Fluid Accumulation/Edema: Moderate to severe, 2-4+  Malnutrition Diagnosis: Patient does not meet two of the established criteria necessary for diagnosing malnutrition  Malnutrition Present on Admission: No    NUTRITION DIAGNOSIS  Increased nutrient needs   related to wound healing as evidenced by pressure injuries    INTERVENTIONS  Medical food supplement therapy  Vitamin and mineral supplement therapy  Discussed eating a healthy balance diet with adequate protein for healing    GOALS  Wound healing  Gradual weight loss acceptable     MONITORING/EVALUATION  Progress toward goals will be monitored and evaluated per policy.

## 2025-07-25 ENCOUNTER — APPOINTMENT (OUTPATIENT)
Dept: PHYSICAL THERAPY | Facility: HOSPITAL | Age: 50
End: 2025-07-25
Attending: NURSE PRACTITIONER
Payer: COMMERCIAL

## 2025-07-25 LAB
ANION GAP SERPL CALCULATED.3IONS-SCNC: 9 MMOL/L (ref 7–15)
BUN SERPL-MCNC: 18.5 MG/DL (ref 6–20)
CALCIUM SERPL-MCNC: 9 MG/DL (ref 8.8–10.4)
CHLORIDE SERPL-SCNC: 102 MMOL/L (ref 98–107)
CREAT SERPL-MCNC: 0.87 MG/DL (ref 0.51–0.95)
EGFRCR SERPLBLD CKD-EPI 2021: 81 ML/MIN/1.73M2
GLUCOSE SERPL-MCNC: 82 MG/DL (ref 70–99)
HCO3 SERPL-SCNC: 26 MMOL/L (ref 22–29)
LACTATE SERPL-SCNC: 1.3 MMOL/L (ref 0.7–2)
MAGNESIUM SERPL-MCNC: 1.9 MG/DL (ref 1.7–2.3)
POTASSIUM SERPL-SCNC: 3.3 MMOL/L (ref 3.4–5.3)
POTASSIUM SERPL-SCNC: 3.5 MMOL/L (ref 3.4–5.3)
POTASSIUM SERPL-SCNC: 3.6 MMOL/L (ref 3.4–5.3)
SODIUM SERPL-SCNC: 137 MMOL/L (ref 135–145)

## 2025-07-25 PROCEDURE — 250N000013 HC RX MED GY IP 250 OP 250 PS 637: Performed by: NURSE PRACTITIONER

## 2025-07-25 PROCEDURE — 250N000013 HC RX MED GY IP 250 OP 250 PS 637: Performed by: STUDENT IN AN ORGANIZED HEALTH CARE EDUCATION/TRAINING PROGRAM

## 2025-07-25 PROCEDURE — 97162 PT EVAL MOD COMPLEX 30 MIN: CPT | Mod: GP | Performed by: PHYSICAL THERAPIST

## 2025-07-25 PROCEDURE — 94660 CPAP INITIATION&MGMT: CPT

## 2025-07-25 PROCEDURE — 83605 ASSAY OF LACTIC ACID: CPT | Performed by: STUDENT IN AN ORGANIZED HEALTH CARE EDUCATION/TRAINING PROGRAM

## 2025-07-25 PROCEDURE — 250N000011 HC RX IP 250 OP 636: Performed by: STUDENT IN AN ORGANIZED HEALTH CARE EDUCATION/TRAINING PROGRAM

## 2025-07-25 PROCEDURE — 120N000004 HC R&B MS OVERFLOW

## 2025-07-25 PROCEDURE — 999N000157 HC STATISTIC RCP TIME EA 10 MIN

## 2025-07-25 PROCEDURE — 97530 THERAPEUTIC ACTIVITIES: CPT | Mod: GP | Performed by: PHYSICAL THERAPIST

## 2025-07-25 PROCEDURE — 84132 ASSAY OF SERUM POTASSIUM: CPT | Performed by: STUDENT IN AN ORGANIZED HEALTH CARE EDUCATION/TRAINING PROGRAM

## 2025-07-25 PROCEDURE — 97535 SELF CARE MNGMENT TRAINING: CPT | Mod: GP | Performed by: PHYSICAL THERAPIST

## 2025-07-25 PROCEDURE — 99232 SBSQ HOSP IP/OBS MODERATE 35: CPT | Performed by: STUDENT IN AN ORGANIZED HEALTH CARE EDUCATION/TRAINING PROGRAM

## 2025-07-25 PROCEDURE — 80048 BASIC METABOLIC PNL TOTAL CA: CPT | Performed by: STUDENT IN AN ORGANIZED HEALTH CARE EDUCATION/TRAINING PROGRAM

## 2025-07-25 PROCEDURE — 83735 ASSAY OF MAGNESIUM: CPT | Performed by: NURSE PRACTITIONER

## 2025-07-25 RX ORDER — POTASSIUM CHLORIDE 1500 MG/1
20 TABLET, EXTENDED RELEASE ORAL ONCE
Status: COMPLETED | OUTPATIENT
Start: 2025-07-25 | End: 2025-07-25

## 2025-07-25 RX ORDER — MAGNESIUM OXIDE 400 MG/1
400 TABLET ORAL EVERY 4 HOURS
Status: COMPLETED | OUTPATIENT
Start: 2025-07-25 | End: 2025-07-25

## 2025-07-25 RX ORDER — POTASSIUM CHLORIDE 1500 MG/1
40 TABLET, EXTENDED RELEASE ORAL ONCE
Status: COMPLETED | OUTPATIENT
Start: 2025-07-25 | End: 2025-07-25

## 2025-07-25 RX ORDER — SPIRONOLACTONE 25 MG/1
25 TABLET ORAL DAILY
Status: DISCONTINUED | OUTPATIENT
Start: 2025-07-26 | End: 2025-07-30 | Stop reason: HOSPADM

## 2025-07-25 RX ADMIN — POTASSIUM CHLORIDE 20 MEQ: 1500 TABLET, EXTENDED RELEASE ORAL at 14:41

## 2025-07-25 RX ADMIN — MICONAZOLE NITRATE ANTIFUNGAL POWDER: 2 POWDER TOPICAL at 20:10

## 2025-07-25 RX ADMIN — BUMETANIDE 2 MG: 2 TABLET ORAL at 17:44

## 2025-07-25 RX ADMIN — Medication 60 ML: at 09:53

## 2025-07-25 RX ADMIN — ESCITALOPRAM OXALATE 10 MG: 10 TABLET ORAL at 08:46

## 2025-07-25 RX ADMIN — ASPIRIN 81 MG: 81 TABLET, COATED ORAL at 08:47

## 2025-07-25 RX ADMIN — AMPICILLIN SODIUM 2 G: 2 INJECTION, POWDER, FOR SOLUTION INTRAMUSCULAR; INTRAVENOUS at 05:18

## 2025-07-25 RX ADMIN — MICONAZOLE NITRATE ANTIFUNGAL POWDER: 2 POWDER TOPICAL at 17:45

## 2025-07-25 RX ADMIN — AMPICILLIN SODIUM 2 G: 2 INJECTION, POWDER, FOR SOLUTION INTRAMUSCULAR; INTRAVENOUS at 12:47

## 2025-07-25 RX ADMIN — AMPICILLIN SODIUM 2 G: 2 INJECTION, POWDER, FOR SOLUTION INTRAMUSCULAR; INTRAVENOUS at 00:39

## 2025-07-25 RX ADMIN — MAGNESIUM OXIDE TAB 400 MG (241.3 MG ELEMENTAL MG) 400 MG: 400 (241.3 MG) TAB at 12:48

## 2025-07-25 RX ADMIN — Medication 1 TABLET: at 08:46

## 2025-07-25 RX ADMIN — SIMVASTATIN 10 MG: 10 TABLET, FILM COATED ORAL at 20:09

## 2025-07-25 RX ADMIN — ACETAMINOPHEN 500 MG: 500 TABLET, FILM COATED ORAL at 02:20

## 2025-07-25 RX ADMIN — APIXABAN 5 MG: 5 TABLET, FILM COATED ORAL at 08:47

## 2025-07-25 RX ADMIN — POTASSIUM CHLORIDE 30 MEQ: 1500 TABLET, EXTENDED RELEASE ORAL at 09:29

## 2025-07-25 RX ADMIN — MAGNESIUM OXIDE TAB 400 MG (241.3 MG ELEMENTAL MG) 400 MG: 400 (241.3 MG) TAB at 08:47

## 2025-07-25 RX ADMIN — APIXABAN 5 MG: 5 TABLET, FILM COATED ORAL at 20:09

## 2025-07-25 RX ADMIN — AMPICILLIN SODIUM 2 G: 2 INJECTION, POWDER, FOR SOLUTION INTRAMUSCULAR; INTRAVENOUS at 17:45

## 2025-07-25 RX ADMIN — POTASSIUM CHLORIDE 40 MEQ: 1500 TABLET, EXTENDED RELEASE ORAL at 08:47

## 2025-07-25 RX ADMIN — Medication 1 CAPSULE: at 08:46

## 2025-07-25 RX ADMIN — AMPICILLIN SODIUM 2 G: 2 INJECTION, POWDER, FOR SOLUTION INTRAMUSCULAR; INTRAVENOUS at 21:29

## 2025-07-25 RX ADMIN — AMPICILLIN SODIUM 2 G: 2 INJECTION, POWDER, FOR SOLUTION INTRAMUSCULAR; INTRAVENOUS at 08:47

## 2025-07-25 RX ADMIN — POTASSIUM CHLORIDE 20 MEQ: 1500 TABLET, EXTENDED RELEASE ORAL at 05:18

## 2025-07-25 RX ADMIN — Medication 1 CAPSULE: at 20:11

## 2025-07-25 RX ADMIN — BUMETANIDE 2 MG: 2 TABLET ORAL at 08:46

## 2025-07-25 ASSESSMENT — ACTIVITIES OF DAILY LIVING (ADL)
ADLS_ACUITY_SCORE: 61
ADLS_ACUITY_SCORE: 61
ADLS_ACUITY_SCORE: 57
ADLS_ACUITY_SCORE: 65
ADLS_ACUITY_SCORE: 57
ADLS_ACUITY_SCORE: 61
ADLS_ACUITY_SCORE: 65
ADLS_ACUITY_SCORE: 56
ADLS_ACUITY_SCORE: 57
ADLS_ACUITY_SCORE: 65
ADLS_ACUITY_SCORE: 65
ADLS_ACUITY_SCORE: 57
ADLS_ACUITY_SCORE: 65
ADLS_ACUITY_SCORE: 57
ADLS_ACUITY_SCORE: 61
ADLS_ACUITY_SCORE: 57
ADLS_ACUITY_SCORE: 65

## 2025-07-25 NOTE — PLAN OF CARE
Lymphedema Discharge Summary    Reason for therapy discharge:    Patient/family request discontinuation of services.    Progress towards therapy goal(s). See goals on Care Plan in Epic electronic health record for goal details.  No compression initiated this hospitalization.    Therapy recommendation(s):    Pt would benefit from further lymphedema therapy as agreeable.    Thelma Peñaloza, PT  7/25/2025

## 2025-07-25 NOTE — PLAN OF CARE
Problem: Skin Injury Risk Increased  Goal: Skin Health and Integrity  Intervention: Plan: Nurse Driven Intervention: Moisture Management  Recent Flowsheet Documentation  Taken 7/25/2025 0037 by Klarissa Caputo RN  Moisture Interventions:   Encourage regular toileting   No brief in bed   Incontinence pad   Urinary collection device  Intervention: Plan: Nurse Driven Intervention: Friction and Shear  Recent Flowsheet Documentation  Taken 7/25/2025 0037 by Klarissa Caputo RN  Friction/Shear Interventions: HOB 30 degrees or less  Intervention: Optimize Skin Protection  Recent Flowsheet Documentation  Taken 7/25/2025 0352 by Klarissa Caputo RN  Activity Management: activity adjusted per tolerance  Head of Bed (HOB) Positioning: HOB at 30 degrees  Taken 7/25/2025 0037 by Klarissa Caputo RN  Activity Management: activity adjusted per tolerance  Head of Bed (HOB) Positioning: HOB at 30 degrees     Problem: Skin Injury Risk Increased  Goal: Skin Health and Integrity  Intervention: Plan: Nurse Driven Intervention: Moisture Management  Recent Flowsheet Documentation  Taken 7/25/2025 0037 by Klarissa Caputo RN  Moisture Interventions:   Encourage regular toileting   No brief in bed   Incontinence pad   Urinary collection device  Intervention: Plan: Nurse Driven Intervention: Friction and Shear  Recent Flowsheet Documentation  Taken 7/25/2025 0037 by Klarissa Caputo RN  Friction/Shear Interventions: HOB 30 degrees or less  Intervention: Optimize Skin Protection  Recent Flowsheet Documentation  Taken 7/25/2025 0352 by Klarissa Caputo, RN  Activity Management: activity adjusted per tolerance  Head of Bed (HOB) Positioning: HOB at 30 degrees  Taken 7/25/2025 0037 by Klarissa Caputo RN  Activity Management: activity adjusted per tolerance  Head of Bed (HOB) Positioning: HOB at 30 degrees     Problem: Gas Exchange Impaired  Goal: Optimal Gas Exchange  Outcome: Progressing  Intervention: Optimize Oxygenation and Ventilation  Recent  Flowsheet Documentation  Taken 7/25/2025 0352 by Klarissa Caputo RN  Head of Bed (HOB) Positioning: HOB at 30 degrees  Taken 7/25/2025 0037 by Klarissa Caputo RN  Head of Bed (HOB) Positioning: HOB at 30 degrees     Problem: Fluid Volume Excess  Goal: Fluid Balance  Outcome: Progressing     Problem: Sepsis/Septic Shock  Goal: Absence of Infection Signs and Symptoms  Outcome: Progressing  Intervention: Initiate Sepsis Management  Recent Flowsheet Documentation  Taken 7/25/2025 0352 by Klarissa Caputo RN  Infection Prevention:   single patient room provided   rest/sleep promoted   hand hygiene promoted  Taken 7/25/2025 0037 by Klarissa Caputo RN  Infection Prevention:   single patient room provided   rest/sleep promoted   hand hygiene promoted  Intervention: Promote Recovery  Recent Flowsheet Documentation  Taken 7/25/2025 0352 by Klarissa Caputo RN  Activity Management: activity adjusted per tolerance  Taken 7/25/2025 0037 by Klarissa Caputo RN  Activity Management: activity adjusted per tolerance      Problem: Skin Injury Risk Increased  Goal: Skin Health and Integrity  Outcome: Progressing  Intervention: Plan: Nurse Driven Intervention: Moisture Management  Recent Flowsheet Documentation  Taken 7/25/2025 0037 by Klarissa Caputo RN  Moisture Interventions:   Encourage regular toileting   No brief in bed   Incontinence pad   Urinary collection device  Intervention: Plan: Nurse Driven Intervention: Friction and Shear  Recent Flowsheet Documentation  Taken 7/25/2025 0037 by Klarissa Caputo RN  Friction/Shear Interventions: HOB 30 degrees or less  Intervention: Optimize Skin Protection  Recent Flowsheet Documentation  Taken 7/25/2025 0352 by Klarissa Caputo RN  Activity Management: activity adjusted per tolerance  Head of Bed (HOB) Positioning: HOB at 30 degrees  Taken 7/25/2025 0037 by Klarissa Caputo RN  Activity Management: activity adjusted per tolerance  Head of Bed (HOB) Positioning: HOB at 30 degrees      Goal  Outcome Evaluation:         Denied any pain. Lungs diminished. Tolerating Bipap with 30% FIO2. Patient's  face turn blue with turning. Patient stated she's okay. On oral bumex. Purewick in place. Fever of 101.2. Tylenol given. Lactic normal at 1.3. Temperature went down to 98.3 after tylenol. Soft BP 98/53 and 101/59. Getting IV Ampicillin  every 4 hours. Has triple lumen PICC. Turned and repositioned. Denied pain.

## 2025-07-25 NOTE — PROGRESS NOTES
[facility]    Medicine Progress Note - Hospitalist Service    Date of Admission:  7/22/2025    Assessment & Plan   49-year-old female with a history of multiple sclerosis not on treatment, obstructive sleep apnea, obesity hypoventilation syndrome, severe pulmonary hypertension, diastolic heart failure, lymphedema of lower extremities, pulmonary embolism. Admitted to ICU with septic shock.       Sepsis  E. fecalis bacteremia  Came to the ED where she was found to be febrile and hypotensive.  She was tachypneic and was started on BiPAP.  Admitted recently with Pseudomonas.  Blood culture grew Enterococcus faecalis  Patient was on Zosyn and vancomycin.  Antibiotics switched to ampicillin  Urine culture is unremarkable    Acute on Chronic respiratory failure   Obesity hypoventilation syndrome  Severe pulmonary hypertension  -Precipitated due to sepsis  - ON BIPAP at night  - Oxygen  down to  4 L of oxygen via nasal cannula during daytime    Chronic HFpEF  - Has gained 2 pounds since admission  - Bumex 2 milligram oral twice daily resumed   - Plan to resume spironolactone tomorrow  - Monitor input and output, daily weight    History of pulmonary embolism  - Continue Eliquis    Hypertension  - Blood pressure on the softer side  - Continue to monitor    Hypokalemia  - Potassium chloride 30 mEq oral daily  - Potassium replacement protocol     Bilateral lower extremity lymphedema  - Lymphedema wraps            Diet: Low Saturated Fat Na <2400 mg    DVT Prophylaxis: DOAC  Brar Catheter: Not present  Lines: PRESENT      PICC 07/22/25 Triple Lumen Left Basilic-Site Assessment: WDL      Cardiac Monitoring: ACTIVE order. Indication: sepsis  Code Status: Full Code      Clinically Significant Risk Factors        # Hypokalemia: Lowest K = 3.3 mmol/L in last 2 days, will replace as needed        # Hypoalbuminemia: Lowest albumin = 3.2 g/dL at 7/22/2025  8:51 PM, will monitor as appropriate     #  "Hypertension: Noted on problem list  # Chronic heart failure with preserved ejection fraction: heart failure noted on problem list and last echo with EF >50%           # Morbid Obesity: Estimated body mass index is 52.22 kg/m  as calculated from the following:    Height as of this encounter: 1.626 m (5' 4\").    Weight as of this encounter: 138 kg (304 lb 3.2 oz)., PRESENT ON ADMISSION     # Financial/Environmental Concerns:           Social Drivers of Health    Housing Stability: Low Risk  (7/22/2025)    Housing Stability     Do you have housing? : Yes     Are you worried about losing your housing?: No   Recent Concern: Housing Stability - High Risk (5/8/2025)    Housing Stability     Do you have housing? : No     Are you worried about losing your housing?: No   Transportation Needs: Low Risk  (7/22/2025)    Transportation Needs     Within the past 12 months, has lack of transportation kept you from medical appointments, getting your medicines, non-medical meetings or appointments, work, or from getting things that you need?: No   Recent Concern: Transportation Needs - High Risk (5/8/2025)    Transportation Needs     Within the past 12 months, has lack of transportation kept you from medical appointments, getting your medicines, non-medical meetings or appointments, work, or from getting things that you need?: Yes   Physical Activity: Inactive (3/21/2024)    Exercise Vital Sign     Days of Exercise per Week: 0 days     Minutes of Exercise per Session: 0 min   Social Connections: Unknown (3/1/2022)    Social Connection and Isolation Panel [NHANES]     Frequency of Communication with Friends and Family: Twice a week     Frequency of Social Gatherings with Friends and Family: Twice a week          Disposition Plan     Medically Ready for Discharge: Anticipated in 2-4 Days             Leigh Hardy MD  Hospitalist Service  [facility]  Securely message with [employer] (more info)  Text page " via [employer] Paging/Directory   ______________________________________________________________________    Interval History   Patient new to me today.  Chart, labs and imaging results reviewed.  Patient transferred out of ICU yesterday.  She reports feeling relatively better now.  She is tolerating BiPAP at night.  No new complaints.  Management plan discussed with patient and she expressed understanding.    Physical Exam   Vital Signs: Temp: 97.4  F (36.3  C) Temp src: Axillary BP: 104/66 Pulse: 77   Resp: 18 SpO2: (!) 90 % O2 Device: BiPAP/CPAP Oxygen Delivery: 30 LPM  Weight: 304 lbs 3.2 oz    General Appearance: No distress noted, chronically sick looking  Respiratory: Diminished air entry bilaterally  Cardiovascular: S1-S2 heard  GI: Soft abdomen, no tenderness, normoactive bowel sound  Skin: Intact and warm  Other: Severe bilateral lymphedema    Medical Decision Making       40 MINUTES SPENT BY ME on the date of service doing chart review, history, exam, documentation & further activities per the note.      Data

## 2025-07-25 NOTE — PROGRESS NOTES
SPIRITUAL HEALTH SERVICES ([facility])  SPIRITUAL ASSESSMENT Progress Note  [facility]. Unit [address]    REFERRAL SOURCE: New admission      Bess has had multiple hospitalizations in the past.  She confirmed that she is Rastafari and open to [facility] visits. She is unaffiliated with a local Worship.     Medically she has lots going on to paraphrase and this is her 5-6th hospitalization.   She expects to be at [facility] for 5-9 days.      She is on disability and was diagnosed with MS at the age of 29.     She needed to work with respiratory therapy so [facility] will return next week.      PLAN: [facility] will re-visit next week.      Miranda Swann, Ph.D., [facility]      Securely Message with [employer] or [employer] Pager.    Non-urgent needs:  Phone  to leave a message

## 2025-07-25 NOTE — PLAN OF CARE
Problem: Gas Exchange Impaired  Goal: Optimal Gas Exchange  Outcome: Progressing   Goal Outcome Evaluation:    Patient on and off Bipap this shift. Using oxymask @ 7 LPM when off BiPap.   K+ 3.3 one dose + scheduled dose given recheck 3.6. Needs another dose then recheck in AM.  Patient up to recliner w/PT/OT. When she wanted to go back to bed she did not have the strength to get out of the chair, Elidia used.

## 2025-07-25 NOTE — PLAN OF CARE
"Goal Outcome Evaluation:      Plan of Care Reviewed With: patient    Overall Patient Progress: improvingOverall Patient Progress: improving             Vitals:    07/24/25 1718 07/24/25 1818 07/24/25 1944 07/24/25 2009   BP: 115/67 112/63 110/57    BP Location:   Right arm    Pulse: 90 91     Resp: 20  22    Temp: 98  F (36.7  C)  98.5  F (36.9  C)    TempSrc: Oral  Oral    SpO2: 93% 94% 95% 93%   Weight: (!) 138.2 kg (304 lb 11.2 oz)      Height: 1.626 m (5' 4\")       On bipap. Alert and oriented. Denies pain. Lungs diminished. A-2-3. Repositioned with pillows. Purwick In place. K replaced. IV antibiotics. Cyndi Chang RN   "

## 2025-07-25 NOTE — PROGRESS NOTES
"   07/25/25 0989   Appointment Info   Signing Clinician's Name / Credentials (PT) Thelma Peñaloza, PT, DPT   Quick Adds   Quick Adds Edema Eval   Living Environment   People in Home alone   Current Living Arrangements other (see comments)  (townhouse)   Home Accessibility no concerns  (portable ramp to use for entrance)   Living Environment Comments Pt sleeps in a recliner.   Self-Care   Equipment Currently Used at Home commode chair;grab bar, tub/shower;shower chair;walker, rolling;wheelchair, manual  (FWW)   Fall history within last six months yes   Number of times patient has fallen within last six months 1   Activity/Exercise/Self-Care Comment Pt independent with all ADLs. Pt primarily uses a wheelchair and pivots for mobility. Pt reports she would feel comfortable ambulating 40' independently. Pt had nursing services at home. Pt has a  who comes 1x/week. Pt orders groceries and has medical transport for appointments.   General Information   Onset of Illness/Injury or Date of Surgery 07/22/25   Referring Physician Leigh Hardy MD   Patient/Family Therapy Goals Statement (PT) None stated.   Pertinent History of Current Problem (include personal factors and/or comorbidities that impact the POC) Per H&P: \"49-year-old female with a history of multiple sclerosis not on treatment, obstructive sleep apnea, obesity hypoventilation syndrome, severe pulmonary hypertension, diastolic heart failure, lymphedema of lower extremities, pulmonary embolism. Admitted to ICU with septic shock. \"   Existing Precautions/Restrictions fall   Integumentary/Edema   Onset of Edema   (chronic history, pt well known to lymphedema team)   Affected Body Part(s) Right LE;Left LE   Edema Etiology Unknown   Edema Precaution Comments Pt reports ongoing infection in L LE.   Edema Examination/Assessment   Skin Condition Non-pitting;Other (see comments)  (papillomas)   Skin Condition Comments L lower extremity appears " significantly more reddened than R LE. Pt demonstrates papillomas and fibrosis to bilateral lower legs.   Scar No   Ulcerations No   Pitting Assessment Non-pitting but presents with skin changes indicative of chronic swelling.   Range of Motion (ROM)   Range of Motion ROM deficits secondary to weakness   Strength (Manual Muscle Testing)   Strength (Manual Muscle Testing) Deficits observed during functional mobility   Bed Mobility   Bed Mobility supine-sit   Supine-Sit [place] (Bed Mobility) minimum assist (75% patient effort);verbal cues   Bed Mobility Limitations decreased ability to use arms for pushing/pulling;decreased ability to use legs for bridging/pushing   Impairments Contributing to Impaired Bed Mobility decreased strength   Assistive Device (Bed Mobility) bed rails   Transfers   Transfers sit-stand transfer   Transfer Safety Concerns Noted decreased weight-shifting ability   Impairments Contributing to Impaired Transfers impaired balance;decreased strength   Sit-Stand Transfer   Sit-Stand [place] (Transfers) minimum assist (75% patient effort);verbal cues;2 person assist   Assistive Device (Sit-Stand Transfers) walker, front-wheeled   Gait/Stairs (Locomotion)   [place] Level (Gait) minimum assist (75% patient effort);2 person assist;verbal cues   Assistive Device (Gait) walker, front-wheeled   Distance in Feet (Gait) 3' bed > chair   Pattern (Gait) step-to   Deviations/Abnormal Patterns (Gait) leela decreased;gait speed decreased   Clinical Impression   Criteria for Skilled Therapeutic Intervention Yes, treatment indicated   PT Diagnosis (PT) impaired functional mobility   Edema: Patient Presentation Edema   Influenced by the following impairments decreased strength, impaired balance, decreased activity tolerance   Functional limitations due to impairments transfers, ambulation   Clinical Presentation (PT Evaluation Complexity) evolving   Clinical Presentation Rationale Clinical judgment    Clinical Decision Making (Complexity) moderate complexity   Planned Therapy Interventions (PT) balance training;bed mobility training;gait training;home exercise program;neuromuscular re-education;patient/family education;strengthening;transfer training;wheelchair management/propulsion training   Risk & Benefits of therapy have been explained evaluation/treatment results reviewed;participants voiced agreement with care plan;participants included;patient   PT Total Evaluation Time   PT Eval, Moderate Complexity Minutes (19465) 15   Physical Therapy Goals   PT Frequency 6x/week   PT Predicted Duration/Target Date for Goal Attainment 08/01/25   PT Goals Bed Mobility;Transfers;Gait;Wheelchair Mobility   PT: Bed Mobility Supervision/stand-by assist;Supine to/from sit   PT: Transfers Supervision/stand-by assist;Sit to/from stand;Assistive device   PT: Gait Supervision/stand-by assist;Assistive device;Rolling walker;25 feet   PT: Wheelchair Mobility 50 feet;Caregiver SBA;manual wheelchair   Interventions   Interventions Quick Adds Therapeutic Activity;Self-Care/Home Mgmt   Therapeutic Activity   Therapeutic Activities: dynamic activities to improve functional performance Minutes (25571) 10   Symptoms Noted During/After Treatment Fatigue   Treatment Detail/Skilled Intervention Verbal cues throughout bed mobility for increased independence. Pt requires min assist to move L LE toward EOB. Pt also requires assist to move L LE underneath once sitting EOB to improve ease of sit <> stand. Pt prefers to complete mobility as independently as possible.   Self-Care/Home Management   Self-Care/Home Mgmt/ADL, Compensatory, Meal Prep Minutes (35279) 8   Symptoms Noted During/After Treatment none   Treatment Detail/Skilled Intervention Extensive discussion with patient regarding lymphedema therapy needs. Pt well known to lymphedema therapy team from previous hospitalizations. Patient requesting to hold compression at this time due to  continued pain in L LE. Reports ongoing infection. Encouraged pt to notify nursing/MD if wishing to pursue compression.   PT Discharge Planning   PT Plan will d/c lymphedema; PT: transfers, progress to gait with chair follow as able, LE strengthening   PT Discharge Recommendation (DC Rec) other (see comments)  (24 hour supervision/assist)   PT Rationale for DC Rec Patient requiring hands on assist of 1-2 for transfers. Recommend TCU as safest option, however patient is very adamant that she does not want to go to TCU at discharge. At this time, patient will require 24 hour supervision/assist and hands on assist of 1-2 for transfers.   PT Brief overview of current status Supine > sit, min assist of 1. Sit <> stand and pivot to chair, min assist of 2.   PT Total Distance Amb During Session (feet) 3   PT Equipment Needed at Discharge walker, rolling;wheelchair   Physical Therapy Time and Intention   Timed Code Treatment Minutes 18   Total Session Time (sum of timed and untimed services) 33

## 2025-07-26 ENCOUNTER — APPOINTMENT (OUTPATIENT)
Dept: PHYSICAL THERAPY | Facility: HOSPITAL | Age: 50
End: 2025-07-26
Payer: COMMERCIAL

## 2025-07-26 LAB
ANION GAP SERPL CALCULATED.3IONS-SCNC: 8 MMOL/L (ref 7–15)
BUN SERPL-MCNC: 16.3 MG/DL (ref 6–20)
CALCIUM SERPL-MCNC: 8.8 MG/DL (ref 8.8–10.4)
CHLORIDE SERPL-SCNC: 101 MMOL/L (ref 98–107)
CREAT SERPL-MCNC: 0.75 MG/DL (ref 0.51–0.95)
EGFRCR SERPLBLD CKD-EPI 2021: >90 ML/MIN/1.73M2
ERYTHROCYTE [DISTWIDTH] IN BLOOD BY AUTOMATED COUNT: 15.4 % (ref 10–15)
GLUCOSE SERPL-MCNC: 83 MG/DL (ref 70–99)
HCO3 SERPL-SCNC: 25 MMOL/L (ref 22–29)
HCT VFR BLD AUTO: 32.8 % (ref 35–47)
HGB BLD-MCNC: 10.2 G/DL (ref 11.7–15.7)
MAGNESIUM SERPL-MCNC: 1.7 MG/DL (ref 1.7–2.3)
MCH RBC QN AUTO: 25.8 PG (ref 26.5–33)
MCHC RBC AUTO-ENTMCNC: 31.1 G/DL (ref 31.5–36.5)
MCV RBC AUTO: 83 FL (ref 78–100)
PLATELET # BLD AUTO: 182 10E3/UL (ref 150–450)
POTASSIUM SERPL-SCNC: 3.2 MMOL/L (ref 3.4–5.3)
POTASSIUM SERPL-SCNC: 3.5 MMOL/L (ref 3.4–5.3)
RBC # BLD AUTO: 3.96 10E6/UL (ref 3.8–5.2)
SODIUM SERPL-SCNC: 134 MMOL/L (ref 135–145)
WBC # BLD AUTO: 7.9 10E3/UL (ref 4–11)

## 2025-07-26 PROCEDURE — 99232 SBSQ HOSP IP/OBS MODERATE 35: CPT | Performed by: STUDENT IN AN ORGANIZED HEALTH CARE EDUCATION/TRAINING PROGRAM

## 2025-07-26 PROCEDURE — 120N000004 HC R&B MS OVERFLOW

## 2025-07-26 PROCEDURE — 250N000013 HC RX MED GY IP 250 OP 250 PS 637: Performed by: NURSE PRACTITIONER

## 2025-07-26 PROCEDURE — 250N000011 HC RX IP 250 OP 636: Performed by: STUDENT IN AN ORGANIZED HEALTH CARE EDUCATION/TRAINING PROGRAM

## 2025-07-26 PROCEDURE — 84132 ASSAY OF SERUM POTASSIUM: CPT | Performed by: NURSE PRACTITIONER

## 2025-07-26 PROCEDURE — 250N000013 HC RX MED GY IP 250 OP 250 PS 637: Performed by: STUDENT IN AN ORGANIZED HEALTH CARE EDUCATION/TRAINING PROGRAM

## 2025-07-26 PROCEDURE — 80048 BASIC METABOLIC PNL TOTAL CA: CPT | Performed by: STUDENT IN AN ORGANIZED HEALTH CARE EDUCATION/TRAINING PROGRAM

## 2025-07-26 PROCEDURE — 97110 THERAPEUTIC EXERCISES: CPT | Mod: GP

## 2025-07-26 PROCEDURE — 85014 HEMATOCRIT: CPT | Performed by: STUDENT IN AN ORGANIZED HEALTH CARE EDUCATION/TRAINING PROGRAM

## 2025-07-26 PROCEDURE — 999N000157 HC STATISTIC RCP TIME EA 10 MIN

## 2025-07-26 PROCEDURE — 94660 CPAP INITIATION&MGMT: CPT

## 2025-07-26 PROCEDURE — 83735 ASSAY OF MAGNESIUM: CPT | Performed by: STUDENT IN AN ORGANIZED HEALTH CARE EDUCATION/TRAINING PROGRAM

## 2025-07-26 RX ORDER — MAGNESIUM OXIDE 400 MG/1
400 TABLET ORAL EVERY 4 HOURS
Status: COMPLETED | OUTPATIENT
Start: 2025-07-26 | End: 2025-07-26

## 2025-07-26 RX ORDER — POTASSIUM CHLORIDE 1500 MG/1
40 TABLET, EXTENDED RELEASE ORAL EVERY MORNING
Status: DISCONTINUED | OUTPATIENT
Start: 2025-07-26 | End: 2025-07-30 | Stop reason: HOSPADM

## 2025-07-26 RX ORDER — POTASSIUM CHLORIDE 7.45 MG/ML
10 INJECTION INTRAVENOUS
Status: COMPLETED | OUTPATIENT
Start: 2025-07-26 | End: 2025-07-26

## 2025-07-26 RX ORDER — POTASSIUM CHLORIDE 1500 MG/1
40 TABLET, EXTENDED RELEASE ORAL ONCE
Status: COMPLETED | OUTPATIENT
Start: 2025-07-26 | End: 2025-07-26

## 2025-07-26 RX ORDER — AMOXICILLIN 250 MG/1
1000 CAPSULE ORAL EVERY 8 HOURS SCHEDULED
Status: DISCONTINUED | OUTPATIENT
Start: 2025-07-26 | End: 2025-07-30 | Stop reason: HOSPADM

## 2025-07-26 RX ADMIN — AMOXICILLIN 1000 MG: 250 CAPSULE ORAL at 17:04

## 2025-07-26 RX ADMIN — POTASSIUM CHLORIDE 10 MEQ: 7.46 INJECTION, SOLUTION INTRAVENOUS at 14:25

## 2025-07-26 RX ADMIN — APIXABAN 5 MG: 5 TABLET, FILM COATED ORAL at 08:41

## 2025-07-26 RX ADMIN — ASPIRIN 81 MG: 81 TABLET, COATED ORAL at 08:41

## 2025-07-26 RX ADMIN — POTASSIUM CHLORIDE 40 MEQ: 1500 TABLET, EXTENDED RELEASE ORAL at 09:08

## 2025-07-26 RX ADMIN — Medication 60 ML: at 08:42

## 2025-07-26 RX ADMIN — POTASSIUM CHLORIDE 40 MEQ: 1500 TABLET, EXTENDED RELEASE ORAL at 08:41

## 2025-07-26 RX ADMIN — SIMVASTATIN 10 MG: 10 TABLET, FILM COATED ORAL at 21:18

## 2025-07-26 RX ADMIN — Medication 1 TABLET: at 08:41

## 2025-07-26 RX ADMIN — MAGNESIUM OXIDE TAB 400 MG (241.3 MG ELEMENTAL MG) 400 MG: 400 (241.3 MG) TAB at 08:41

## 2025-07-26 RX ADMIN — MICONAZOLE NITRATE ANTIFUNGAL POWDER: 2 POWDER TOPICAL at 08:42

## 2025-07-26 RX ADMIN — AMPICILLIN SODIUM 2 G: 2 INJECTION, POWDER, FOR SOLUTION INTRAMUSCULAR; INTRAVENOUS at 12:30

## 2025-07-26 RX ADMIN — AMOXICILLIN 1000 MG: 250 CAPSULE ORAL at 21:17

## 2025-07-26 RX ADMIN — SPIRONOLACTONE 25 MG: 25 TABLET, FILM COATED ORAL at 08:42

## 2025-07-26 RX ADMIN — MICONAZOLE NITRATE ANTIFUNGAL POWDER: 2 POWDER TOPICAL at 21:22

## 2025-07-26 RX ADMIN — AMPICILLIN SODIUM 2 G: 2 INJECTION, POWDER, FOR SOLUTION INTRAMUSCULAR; INTRAVENOUS at 08:42

## 2025-07-26 RX ADMIN — Medication 1 CAPSULE: at 08:41

## 2025-07-26 RX ADMIN — BUMETANIDE 2 MG: 2 TABLET ORAL at 08:41

## 2025-07-26 RX ADMIN — APIXABAN 5 MG: 5 TABLET, FILM COATED ORAL at 21:18

## 2025-07-26 RX ADMIN — MAGNESIUM OXIDE TAB 400 MG (241.3 MG ELEMENTAL MG) 400 MG: 400 (241.3 MG) TAB at 12:14

## 2025-07-26 RX ADMIN — POTASSIUM CHLORIDE 10 MEQ: 7.46 INJECTION, SOLUTION INTRAVENOUS at 13:23

## 2025-07-26 RX ADMIN — ESCITALOPRAM OXALATE 10 MG: 10 TABLET ORAL at 08:41

## 2025-07-26 RX ADMIN — BUMETANIDE 2 MG: 2 TABLET ORAL at 17:04

## 2025-07-26 RX ADMIN — Medication 1 CAPSULE: at 21:18

## 2025-07-26 RX ADMIN — AMPICILLIN SODIUM 2 G: 2 INJECTION, POWDER, FOR SOLUTION INTRAMUSCULAR; INTRAVENOUS at 00:47

## 2025-07-26 RX ADMIN — AMPICILLIN SODIUM 2 G: 2 INJECTION, POWDER, FOR SOLUTION INTRAMUSCULAR; INTRAVENOUS at 05:46

## 2025-07-26 RX ADMIN — OXYCODONE HYDROCHLORIDE 10 MG: 5 TABLET ORAL at 04:12

## 2025-07-26 ASSESSMENT — ACTIVITIES OF DAILY LIVING (ADL)
ADLS_ACUITY_SCORE: 53
ADLS_ACUITY_SCORE: 53
ADLS_ACUITY_SCORE: 57
ADLS_ACUITY_SCORE: 53
ADLS_ACUITY_SCORE: 53
ADLS_ACUITY_SCORE: 57
ADLS_ACUITY_SCORE: 57
ADLS_ACUITY_SCORE: 53
ADLS_ACUITY_SCORE: 57
ADLS_ACUITY_SCORE: 53
ADLS_ACUITY_SCORE: 57
ADLS_ACUITY_SCORE: 53
ADLS_ACUITY_SCORE: 57

## 2025-07-26 NOTE — PLAN OF CARE
Goal Outcome Evaluation:         Problem: Adult Inpatient Plan of Care  Goal: Absence of Hospital-Acquired Illness or Injury  Intervention: Prevent Infection  Recent Flowsheet Documentation  Taken 7/25/2025 2000 by Fabian Ellison RN  Infection Prevention: rest/sleep promoted  Taken 7/25/2025 1600 by Fabian Ellison RN  Infection Prevention: rest/sleep promoted     Problem: Skin Injury Risk Increased  Goal: Skin Health and Integrity  Intervention: Optimize Skin Protection  Recent Flowsheet Documentation  Taken 7/25/2025 2200 by Fabian Ellison RN  Head of Bed (HOB) Positioning: HOB at 20-30 degrees  Taken 7/25/2025 2000 by Fabian Ellison RN  Activity Management: activity adjusted per tolerance  Taken 7/25/2025 1629 by Fabian Ellison RN  Activity Management: activity adjusted per tolerance  Head of Bed (HOB) Positioning: HOB at 20-30 degrees  Taken 7/25/2025 1600 by Fabian Ellison RN  Activity Management: activity adjusted per tolerance   Patient is A/Ox4, on 8L oxymask during day and bipap @ night. Not currently getting out of bed, assist x1/2 with cares. Primafit in place and draining. Tele shows SR with 1st degree AVB and BBB. Interdry in abdominal folds as well as under breasts. Turned and repositioned this shift as patient allowed.     K and mg protocols, recheck in the am    Fabian Ellison RN

## 2025-07-26 NOTE — PROGRESS NOTES
[facility]    Medicine Progress Note - Hospitalist Service    Date of Admission:  7/22/2025    Assessment & Plan   49-year-old female with a history of multiple sclerosis not on treatment, obstructive sleep apnea, obesity hypoventilation syndrome, severe pulmonary hypertension, diastolic heart failure, lymphedema of lower extremities, pulmonary embolism. Admitted to ICU with septic shock.     Sepsis  E. fecalis bacteremia  Came to the ED where she was found to be febrile and hypotensive.  She was tachypneic and was started on BiPAP.  Admitted recently with Pseudomonas.  Blood culture grew Enterococcus faecalis  Patient was on Zosyn and vancomycin.  Antibiotics switched to IV ampicillin  oral amocaxillin 1000 mg PO TID started on 07/26  Urine culture is unremarkable    Acute on Chronic respiratory failure   Obesity hypoventilation syndrome  Severe pulmonary hypertension  Precipitated due to sepsis. Now back to her base line respiratory status.   ON BIPAP at night    Chronic HFpEF  Has gained 2 pounds since admission  Bumex 2 milligram oral twice daily resumed   spironolactone 25 mg PO daily   Monitor input and output, daily weight    History of pulmonary embolism  Continue Eliquis    Hypertension  Blood pressure on the softer side  Continue to monitor    Hypokalemia  Increase Potassium chloride 40 mEq oral daily  Potassium replacement protocol     Bilateral lower extremity lymphedema  Lymphedema wraps  A skin breakdown is noted on left lower leg anteriorly           Diet: Low Saturated Fat Na <2400 mg    DVT Prophylaxis: DOAC  Brar Catheter: Not present  Lines: PRESENT      PICC 07/22/25 Triple Lumen Left Basilic-Site Assessment: WDL      Cardiac Monitoring: None  Code Status: Full Code      Clinically Significant Risk Factors        # Hypokalemia: Lowest K = 3.2 mmol/L in last 2 days, will replace as needed  # Hyponatremia: Lowest Na = 134 mmol/L in last 2 days, will monitor as appropriate   "     # Hypoalbuminemia: Lowest albumin = 3.2 g/dL at 7/22/2025  8:51 PM, will monitor as appropriate     # Hypertension: Noted on problem list  # Chronic heart failure with preserved ejection fraction: heart failure noted on problem list and last echo with EF >50%           # Morbid Obesity: Estimated body mass index is 51.58 kg/m  as calculated from the following:    Height as of this encounter: 1.626 m (5' 4\").    Weight as of this encounter: 136.3 kg (300 lb 8 oz).      # Financial/Environmental Concerns:           Social Drivers of Health    Housing Stability: Low Risk  (7/22/2025)    Housing Stability     Do you have housing? : Yes     Are you worried about losing your housing?: No   Recent Concern: Housing Stability - High Risk (5/8/2025)    Housing Stability     Do you have housing? : No     Are you worried about losing your housing?: No   Transportation Needs: Low Risk  (7/22/2025)    Transportation Needs     Within the past 12 months, has lack of transportation kept you from medical appointments, getting your medicines, non-medical meetings or appointments, work, or from getting things that you need?: No   Recent Concern: Transportation Needs - High Risk (5/8/2025)    Transportation Needs     Within the past 12 months, has lack of transportation kept you from medical appointments, getting your medicines, non-medical meetings or appointments, work, or from getting things that you need?: Yes   Physical Activity: Inactive (3/21/2024)    Exercise Vital Sign     Days of Exercise per Week: 0 days     Minutes of Exercise per Session: 0 min   Social Connections: Unknown (3/1/2022)    Social Connection and Isolation Panel [NHANES]     Frequency of Communication with Friends and Family: Twice a week     Frequency of Social Gatherings with Friends and Family: Twice a week          Disposition Plan     Medically Ready for Discharge: Anticipated in 2-4 Days         Leigh Hardy MD  Hospitalist Service  [facility]  Securely message with [employer] (more info)  Text page via [employer] Paging/Directory   ______________________________________________________________________    Interval History   No distress noted.  On 8 L of oxygen via facemask.  BiPAP at night patient states she is feeling relatively better now.  A skin breakdown on the left lower anterior leg is noted.  Management plan discussed with the patient and she expressed understanding.    Physical Exam   Vital Signs: Temp: 97.8  F (36.6  C) Temp src: Oral BP: 118/75 Pulse: 82   Resp: 20 SpO2: (!) 91 % O2 Device: Oxymask Oxygen Delivery: 8 LPM  Weight: 300 lbs 8 oz    General Appearance:  No distress noted, chronically sick looking  Respiratory: Diminished air entry bilaterally  Cardiovascular: S1-S2 heard  GI: Soft abdomen, no tenderness, normoactive bowel sound  Skin: Intact and warm  Other:  Severe bilateral lymphedema. Clean dressing over left lower LE . Doesn't appear infected        Medical Decision Making       40 MINUTES SPENT BY ME on the date of service doing chart review, history, exam, documentation & further activities per the note.      Data

## 2025-07-26 NOTE — PLAN OF CARE
"  Problem: Gas Exchange Impaired  Goal: Optimal Gas Exchange  Outcome: Progressing     Problem: Fluid Volume Excess  Goal: Fluid Balance  Outcome: Progressing     Problem: Pain Acute  Goal: Optimal Pain Control and Function  Outcome: Progressing   Goal Outcome Evaluation:    Patient doing well today. She said that on NOC shift one of the \"bubbles\" on her leg popped and she said that when this happens she has a hard time getting the oozing to stop without putting pressure dressing on.  K+ was 3.2 this AM 20 meq x 4 given, recheck was 3.5. Decided to give IV bumps x 2, recheck in AM.  No PRN meds given this shift.                         "

## 2025-07-26 NOTE — PLAN OF CARE
"Goal Outcome Evaluation:               Patient alert and oriented.  Pain present on left lower extremity, 10 mg of oxycodone given, reduced pain from 7/10 to 2/10. Patient had Bipap on over night.  Vital signs stable.  Turning patient side to side over night.  External catheter in place, changed once.  Patient had small area on left shin open up.  Pressure applied to stop bleeding, and dry gauze with coban applied to area under direction of patient, to prevent weeping under skin from lymphedema.  Will continue to monitor.      Problem: Adult Inpatient Plan of Care  Goal: Plan of Care Review  Description: The Plan of Care Review/Shift note should be completed every shift.  The Outcome Evaluation is a brief statement about your assessment that the patient is improving, declining, or no change.  This information will be displayed automatically on your shift  note.  Outcome: Progressing  Goal: Patient-Specific Goal (Individualized)  Description: You can add care plan individualizations to a care plan. Examples of Individualization might be:  \"Parent requests to be called daily at 9am for status\", \"I have a hard time hearing out of my right ear\", or \"Do not touch me to wake me up as it startles  me\".  Outcome: Progressing  Goal: Absence of Hospital-Acquired Illness or Injury  Outcome: Progressing  Intervention: Identify and Manage Fall Risk  Recent Flowsheet Documentation  Taken 7/26/2025 0000 by Florina Ledesma RN  Safety Promotion/Fall Prevention:   activity supervised   clutter free environment maintained   increased rounding and observation   increase visualization of patient   lighting adjusted   room door open   safety round/check completed   supervised activity   room organization consistent  Intervention: Prevent Skin Injury  Recent Flowsheet Documentation  Taken 7/26/2025 0000 by Florina Ledesma RN  Body Position:   turned   right  Intervention: Prevent Infection  Recent Flowsheet Documentation  Taken " 7/26/2025 0000 by Florina Ledesma, RN  Infection Prevention:   rest/sleep promoted   single patient room provided  Goal: Optimal Comfort and Wellbeing  Outcome: Progressing  Intervention: Monitor Pain and Promote Comfort  Recent Flowsheet Documentation  Taken 7/26/2025 0512 by Florina Ledesma, RN  Pain Management Interventions:   rest   repositioned  Taken 7/26/2025 0412 by Florina Ledesma, RN  Pain Management Interventions: medication (see MAR)  Goal: Readiness for Transition of Care  Outcome: Progressing

## 2025-07-27 LAB
ANION GAP SERPL CALCULATED.3IONS-SCNC: 11 MMOL/L (ref 7–15)
BUN SERPL-MCNC: 17 MG/DL (ref 6–20)
CALCIUM SERPL-MCNC: 9.3 MG/DL (ref 8.8–10.4)
CHLORIDE SERPL-SCNC: 99 MMOL/L (ref 98–107)
CREAT SERPL-MCNC: 0.71 MG/DL (ref 0.51–0.95)
EGFRCR SERPLBLD CKD-EPI 2021: >90 ML/MIN/1.73M2
GLUCOSE SERPL-MCNC: 80 MG/DL (ref 70–99)
HCO3 SERPL-SCNC: 27 MMOL/L (ref 22–29)
MAGNESIUM SERPL-MCNC: 1.8 MG/DL (ref 1.7–2.3)
POTASSIUM SERPL-SCNC: 3.5 MMOL/L (ref 3.4–5.3)
SODIUM SERPL-SCNC: 137 MMOL/L (ref 135–145)

## 2025-07-27 PROCEDURE — 83735 ASSAY OF MAGNESIUM: CPT | Performed by: NURSE PRACTITIONER

## 2025-07-27 PROCEDURE — 250N000013 HC RX MED GY IP 250 OP 250 PS 637: Performed by: STUDENT IN AN ORGANIZED HEALTH CARE EDUCATION/TRAINING PROGRAM

## 2025-07-27 PROCEDURE — 99232 SBSQ HOSP IP/OBS MODERATE 35: CPT | Performed by: STUDENT IN AN ORGANIZED HEALTH CARE EDUCATION/TRAINING PROGRAM

## 2025-07-27 PROCEDURE — 250N000013 HC RX MED GY IP 250 OP 250 PS 637: Performed by: NURSE PRACTITIONER

## 2025-07-27 PROCEDURE — 94660 CPAP INITIATION&MGMT: CPT

## 2025-07-27 PROCEDURE — 999N000157 HC STATISTIC RCP TIME EA 10 MIN

## 2025-07-27 PROCEDURE — 80048 BASIC METABOLIC PNL TOTAL CA: CPT | Performed by: STUDENT IN AN ORGANIZED HEALTH CARE EDUCATION/TRAINING PROGRAM

## 2025-07-27 PROCEDURE — 120N000004 HC R&B MS OVERFLOW

## 2025-07-27 RX ORDER — POTASSIUM CHLORIDE 1500 MG/1
20 TABLET, EXTENDED RELEASE ORAL ONCE
Status: COMPLETED | OUTPATIENT
Start: 2025-07-27 | End: 2025-07-27

## 2025-07-27 RX ORDER — MAGNESIUM OXIDE 400 MG/1
400 TABLET ORAL EVERY 4 HOURS
Status: COMPLETED | OUTPATIENT
Start: 2025-07-27 | End: 2025-07-27

## 2025-07-27 RX ADMIN — MAGNESIUM OXIDE TAB 400 MG (241.3 MG ELEMENTAL MG) 400 MG: 400 (241.3 MG) TAB at 08:28

## 2025-07-27 RX ADMIN — Medication 1 TABLET: at 08:27

## 2025-07-27 RX ADMIN — POTASSIUM CHLORIDE 40 MEQ: 1500 TABLET, EXTENDED RELEASE ORAL at 09:06

## 2025-07-27 RX ADMIN — SPIRONOLACTONE 25 MG: 25 TABLET, FILM COATED ORAL at 08:28

## 2025-07-27 RX ADMIN — MICONAZOLE NITRATE ANTIFUNGAL POWDER: 2 POWDER TOPICAL at 08:28

## 2025-07-27 RX ADMIN — SIMVASTATIN 10 MG: 10 TABLET, FILM COATED ORAL at 21:03

## 2025-07-27 RX ADMIN — APIXABAN 5 MG: 5 TABLET, FILM COATED ORAL at 21:03

## 2025-07-27 RX ADMIN — ESCITALOPRAM OXALATE 10 MG: 10 TABLET ORAL at 08:27

## 2025-07-27 RX ADMIN — APIXABAN 5 MG: 5 TABLET, FILM COATED ORAL at 08:28

## 2025-07-27 RX ADMIN — BUMETANIDE 2 MG: 2 TABLET ORAL at 08:28

## 2025-07-27 RX ADMIN — AMOXICILLIN 1000 MG: 250 CAPSULE ORAL at 05:44

## 2025-07-27 RX ADMIN — POTASSIUM CHLORIDE 20 MEQ: 1500 TABLET, EXTENDED RELEASE ORAL at 08:28

## 2025-07-27 RX ADMIN — Medication 60 ML: at 09:08

## 2025-07-27 RX ADMIN — MICONAZOLE NITRATE ANTIFUNGAL POWDER: 2 POWDER TOPICAL at 21:04

## 2025-07-27 RX ADMIN — AMOXICILLIN 1000 MG: 250 CAPSULE ORAL at 21:02

## 2025-07-27 RX ADMIN — ASPIRIN 81 MG: 81 TABLET, COATED ORAL at 08:28

## 2025-07-27 RX ADMIN — AMOXICILLIN 1000 MG: 250 CAPSULE ORAL at 13:17

## 2025-07-27 RX ADMIN — BUMETANIDE 2 MG: 2 TABLET ORAL at 18:45

## 2025-07-27 RX ADMIN — Medication 1 CAPSULE: at 08:28

## 2025-07-27 RX ADMIN — MAGNESIUM OXIDE TAB 400 MG (241.3 MG ELEMENTAL MG) 400 MG: 400 (241.3 MG) TAB at 11:21

## 2025-07-27 RX ADMIN — Medication 1 CAPSULE: at 21:03

## 2025-07-27 ASSESSMENT — ACTIVITIES OF DAILY LIVING (ADL)
ADLS_ACUITY_SCORE: 57
ADLS_ACUITY_SCORE: 53
ADLS_ACUITY_SCORE: 57
ADLS_ACUITY_SCORE: 53
ADLS_ACUITY_SCORE: 57
ADLS_ACUITY_SCORE: 57
ADLS_ACUITY_SCORE: 53
ADLS_ACUITY_SCORE: 57
ADLS_ACUITY_SCORE: 53
ADLS_ACUITY_SCORE: 57
ADLS_ACUITY_SCORE: 53
ADLS_ACUITY_SCORE: 57
ADLS_ACUITY_SCORE: 57

## 2025-07-27 NOTE — PLAN OF CARE
Goal Outcome Evaluation:         Problem: Adult Inpatient Plan of Care  Goal: Absence of Hospital-Acquired Illness or Injury  Intervention: Prevent Skin Injury  Recent Flowsheet Documentation  Taken 7/26/2025 2050 by Fabian Ellison RN  Body Position:   turned   right   log-rolled   legs elevated  Taken 7/26/2025 1818 by Fabian Ellison RN  Body Position: refuses positioning  Taken 7/26/2025 1557 by Fabian Ellison RN  Body Position:   turned   left   log-rolled   weight shifting     Problem: Gas Exchange Impaired  Goal: Optimal Gas Exchange  Intervention: Optimize Oxygenation and Ventilation  Recent Flowsheet Documentation  Taken 7/26/2025 2050 by Fabian Ellison RN  Head of Bed (HOB) Positioning: HOB at 20-30 degrees  Taken 7/26/2025 1557 by Fabian Ellison RN  Head of Bed (HOB) Positioning: HOB at 20-30 degrees     Patient is A/Ox4, on 10L oxymask during day and bipap at night, currently not getting out of bed. Repositioning as patient allows. Switched to oral abx this shift. Primafit in place and draining. Pt continues to have pain in L leg, offered pain meds, pt refused.    K and Mg protocols, recheck in the am     Fabian Ellison RN

## 2025-07-27 NOTE — PROGRESS NOTES
[facility]    Medicine Progress Note - Hospitalist Service    Date of Admission:  7/22/2025    Assessment & Plan   49-year-old female with a history of multiple sclerosis not on treatment, obstructive sleep apnea, obesity hypoventilation syndrome, severe pulmonary hypertension, diastolic heart failure, lymphedema of lower extremities, pulmonary embolism. Admitted to ICU with septic shock.     Sepsis  E. fecalis bacteremia  Came to the ED where she was found to be febrile and hypotensive.  She was tachypneic and was started on BiPAP.  Admitted recently with Pseudomonas.  Blood culture grew Enterococcus faecalis  Patient was initially on Zosyn and vancomycin.  Antibiotics switched to IV ampicillin  oral amocaxillin 1000 mg PO TID started on 07/26  Urine culture is unremarkable    Acute on Chronic respiratory failure   Obesity hypoventilation syndrome  Severe pulmonary hypertension  Precipitated due to sepsis. Now back to her base line respiratory status.   ON BIPAP at night     Chronic HFpEF  Has gained 2 pounds since admission  Bumex 2 milligram oral twice daily    spironolactone 25 mg PO daily   Monitor input and output, daily weight, has lost 4 killos since admission    History of pulmonary embolism  Continue Eliquis    Hypertension  Blood pressure on the softer side  Continue to monitor    Hypokalemia  Potassium chloride 40 mEq oral daily  Potassium replacement protocol     Bilateral lower extremity lymphedema  Lymphedema wraps  A skin breakdown is noted on left lower leg anteriorly           Diet: Low Saturated Fat Na <2400 mg    DVT Prophylaxis: DOAC  Brar Catheter: Not present  Lines: PRESENT      PICC 07/22/25 Triple Lumen Left Basilic-Site Assessment: WDL      Cardiac Monitoring: None  Code Status: Full Code      Clinically Significant Risk Factors        # Hypokalemia: Lowest K = 3.2 mmol/L in last 2 days, will replace as needed  # Hyponatremia: Lowest Na = 134 mmol/L in last 2  "days, will monitor as appropriate       # Hypoalbuminemia: Lowest albumin = 3.2 g/dL at 7/22/2025  8:51 PM, will monitor as appropriate     # Hypertension: Noted on problem list    # Chronic heart failure with preserved ejection fraction: heart failure noted on problem list and last echo with EF >50%           # Morbid Obesity: Estimated body mass index is 50.86 kg/m  as calculated from the following:    Height as of this encounter: 1.626 m (5' 4\").    Weight as of this encounter: 134.4 kg (296 lb 4.8 oz).        # Financial/Environmental Concerns:           Social Drivers of Health    Housing Stability: Low Risk  (7/22/2025)    Housing Stability     Do you have housing? : Yes     Are you worried about losing your housing?: No   Recent Concern: Housing Stability - High Risk (5/8/2025)    Housing Stability     Do you have housing? : No     Are you worried about losing your housing?: No   Transportation Needs: Low Risk  (7/22/2025)    Transportation Needs     Within the past 12 months, has lack of transportation kept you from medical appointments, getting your medicines, non-medical meetings or appointments, work, or from getting things that you need?: No   Recent Concern: Transportation Needs - High Risk (5/8/2025)    Transportation Needs     Within the past 12 months, has lack of transportation kept you from medical appointments, getting your medicines, non-medical meetings or appointments, work, or from getting things that you need?: Yes   Physical Activity: Inactive (3/21/2024)    Exercise Vital Sign     Days of Exercise per Week: 0 days     Minutes of Exercise per Session: 0 min   Social Connections: Unknown (3/1/2022)    Social Connection and Isolation Panel [NHANES]     Frequency of Communication with Friends and Family: Twice a week     Frequency of Social Gatherings with Friends and Family: Twice a week          Disposition Plan     Medically Ready for Discharge: Anticipated Tomorrow             Miheret " MD Hayley  Hospitalist Service  [facility]  Securely message with [employer] (more info)  Text page via [employer] Paging/Directory   ______________________________________________________________________    Interval History   No distress noted she is on 7 lt of oxygen via nasal cannula.  She is hoping to go home with home care.  She is not back to her baseline respiratory status yet.  She has lost around 4 kg since admission.  Management plan discussed with the patient and she expressed understanding.    Physical Exam   Vital Signs: Temp: 97  F (36.1  C) Temp src: Axillary BP: 105/62 Pulse: 79   Resp: 20 SpO2: 94 % O2 Device: Oxymask Oxygen Delivery: 10 LPM  Weight: 296 lbs 4.8 oz    General Appearance:  No distress noted, chronically sick looking  Respiratory: Diminished air entry bilaterally  Cardiovascular: S1-S2 heard  GI: Soft abdomen, no tenderness, normoactive bowel sound  Skin: Intact and warm  Other:  Severe bilateral lymphedema. Clean dressing over left lower LE . Doesn't appear infected     Medical Decision Making       40 MINUTES SPENT BY ME on the date of service doing chart review, history, exam, documentation & further activities per the note.      Data

## 2025-07-27 NOTE — PLAN OF CARE
"Goal Outcome Evaluation:             Patient alert and oriented.  Denied pain.  External catheter exchanged during night.  Interdry exchanged and fresh powder applied to right pannus.  Right pannus gently cleansed.  Oral antibiotic given as ordered. Patient repositioned, though occasionally refuses.  Tolerated BIPAP over night.  Will continue to monitor.       Problem: Adult Inpatient Plan of Care  Goal: Plan of Care Review  Description: The Plan of Care Review/Shift note should be completed every shift.  The Outcome Evaluation is a brief statement about your assessment that the patient is improving, declining, or no change.  This information will be displayed automatically on your shift  note.  Outcome: Progressing  Goal: Patient-Specific Goal (Individualized)  Description: You can add care plan individualizations to a care plan. Examples of Individualization might be:  \"Parent requests to be called daily at 9am for status\", \"I have a hard time hearing out of my right ear\", or \"Do not touch me to wake me up as it startles  me\".  Outcome: Progressing  Goal: Absence of Hospital-Acquired Illness or Injury  Outcome: Progressing  Intervention: Identify and Manage Fall Risk  Recent Flowsheet Documentation  Taken 7/27/2025 0004 by Florina Ledesma, RN  Safety Promotion/Fall Prevention:   activity supervised   clutter free environment maintained   safety round/check completed   supervised activity   room organization consistent   nonskid shoes/slippers when out of bed   lighting adjusted   increase visualization of patient  Intervention: Prevent Skin Injury  Recent Flowsheet Documentation  Taken 7/27/2025 0000 by Florina Ledesma, RN  Body Position:   supine   supine, legs elevated  Intervention: Prevent Infection  Recent Flowsheet Documentation  Taken 7/27/2025 0004 by Florina Ledesma, RN  Infection Prevention:   rest/sleep promoted   single patient room provided  Goal: Optimal Comfort and Wellbeing  Outcome: " Progressing  Intervention: Provide Person-Centered Care  Recent Flowsheet Documentation  Taken 7/27/2025 0004 by Florina Ledesma, KINDRA  Trust Relationship/Rapport:   care explained   choices provided  Goal: Readiness for Transition of Care  Outcome: Progressing

## 2025-07-27 NOTE — PLAN OF CARE
Problem: Gas Exchange Impaired  Goal: Optimal Gas Exchange  Outcome: Progressing     Problem: Pain Acute  Goal: Optimal Pain Control and Function  Outcome: Progressing   Goal Outcome Evaluation:    Patient doing well this shift. Denied pain. Switched over to oxymask from BiPap. K+ 3.5 one dose 20 meq per protocol and 40 meq daily scheduled given, recheck in AM. Bed change done, purewick replaced, CHG wipes done, gown changed.

## 2025-07-27 NOTE — PROGRESS NOTES
Care Management Follow Up    Length of Stay (days): 5    Expected Discharge Date: 07/28/2025     Concerns to be Addressed:       Patient plan of care discussed at interdisciplinary rounds: Yes    Anticipated Discharge Disposition:  home with home care      Anticipated Discharge Services:  home RN PT OT HHA  Anticipated Discharge DME:  n/a    Patient/family educated on Medicare website which has current facility and service quality ratings:  yes  Education Provided on the Discharge Plan:  yes  Patient/Family in Agreement with the Plan:  yes    Referrals Placed by CM/SW:  yes  Private pay costs discussed: Not applicable    Discussed  Partnership in Safe Discharge Planning  document with patient/family: No     Handoff Completed: No, handoff not indicated or clinically appropriate    Additional Information:  Pt continues to decline TCU that is needed at discharge; requiring 1-2 with assistance. Pt stated that she thinks going home with home care is the best option for her at this time. [facility]    Next Steps: follow for progression of care and updated recommendations.     Brenna Kjellberg, BSW LSW  Acute Care Management  [facility]  For further questions/concerns you can reach us at Vocera Web Console

## 2025-07-28 ENCOUNTER — TELEPHONE (OUTPATIENT)
Dept: CARDIOLOGY | Facility: CLINIC | Age: 50
End: 2025-07-28
Payer: COMMERCIAL

## 2025-07-28 ENCOUNTER — APPOINTMENT (OUTPATIENT)
Dept: PHYSICAL THERAPY | Facility: HOSPITAL | Age: 50
End: 2025-07-28
Payer: COMMERCIAL

## 2025-07-28 LAB
ANION GAP SERPL CALCULATED.3IONS-SCNC: 8 MMOL/L (ref 7–15)
BACTERIA SPEC CULT: NO GROWTH
BACTERIA SPEC CULT: NO GROWTH
BUN SERPL-MCNC: 18.9 MG/DL (ref 6–20)
CALCIUM SERPL-MCNC: 9.3 MG/DL (ref 8.8–10.4)
CHLORIDE SERPL-SCNC: 102 MMOL/L (ref 98–107)
CREAT SERPL-MCNC: 0.75 MG/DL (ref 0.51–0.95)
EGFRCR SERPLBLD CKD-EPI 2021: >90 ML/MIN/1.73M2
ERYTHROCYTE [DISTWIDTH] IN BLOOD BY AUTOMATED COUNT: 15.7 % (ref 10–15)
GLUCOSE SERPL-MCNC: 90 MG/DL (ref 70–99)
HCO3 SERPL-SCNC: 27 MMOL/L (ref 22–29)
HCT VFR BLD AUTO: 37.4 % (ref 35–47)
HGB BLD-MCNC: 11.3 G/DL (ref 11.7–15.7)
MAGNESIUM SERPL-MCNC: 1.9 MG/DL (ref 1.7–2.3)
MCH RBC QN AUTO: 25.1 PG (ref 26.5–33)
MCHC RBC AUTO-ENTMCNC: 30.2 G/DL (ref 31.5–36.5)
MCV RBC AUTO: 83 FL (ref 78–100)
PLATELET # BLD AUTO: 203 10E3/UL (ref 150–450)
POTASSIUM SERPL-SCNC: 3.5 MMOL/L (ref 3.4–5.3)
RBC # BLD AUTO: 4.5 10E6/UL (ref 3.8–5.2)
SODIUM SERPL-SCNC: 137 MMOL/L (ref 135–145)
WBC # BLD AUTO: 5.8 10E3/UL (ref 4–11)

## 2025-07-28 PROCEDURE — 83735 ASSAY OF MAGNESIUM: CPT | Performed by: NURSE PRACTITIONER

## 2025-07-28 PROCEDURE — 120N000004 HC R&B MS OVERFLOW

## 2025-07-28 PROCEDURE — 999N000157 HC STATISTIC RCP TIME EA 10 MIN

## 2025-07-28 PROCEDURE — 250N000013 HC RX MED GY IP 250 OP 250 PS 637: Performed by: STUDENT IN AN ORGANIZED HEALTH CARE EDUCATION/TRAINING PROGRAM

## 2025-07-28 PROCEDURE — 85027 COMPLETE CBC AUTOMATED: CPT | Performed by: STUDENT IN AN ORGANIZED HEALTH CARE EDUCATION/TRAINING PROGRAM

## 2025-07-28 PROCEDURE — 250N000013 HC RX MED GY IP 250 OP 250 PS 637: Performed by: NURSE PRACTITIONER

## 2025-07-28 PROCEDURE — 94660 CPAP INITIATION&MGMT: CPT

## 2025-07-28 PROCEDURE — 97530 THERAPEUTIC ACTIVITIES: CPT | Mod: GP

## 2025-07-28 PROCEDURE — 80048 BASIC METABOLIC PNL TOTAL CA: CPT | Performed by: STUDENT IN AN ORGANIZED HEALTH CARE EDUCATION/TRAINING PROGRAM

## 2025-07-28 PROCEDURE — 99232 SBSQ HOSP IP/OBS MODERATE 35: CPT | Performed by: STUDENT IN AN ORGANIZED HEALTH CARE EDUCATION/TRAINING PROGRAM

## 2025-07-28 RX ORDER — MAGNESIUM OXIDE 400 MG/1
400 TABLET ORAL EVERY 4 HOURS
Status: COMPLETED | OUTPATIENT
Start: 2025-07-28 | End: 2025-07-28

## 2025-07-28 RX ORDER — POTASSIUM CHLORIDE 1500 MG/1
20 TABLET, EXTENDED RELEASE ORAL ONCE
Status: COMPLETED | OUTPATIENT
Start: 2025-07-28 | End: 2025-07-28

## 2025-07-28 RX ADMIN — MAGNESIUM OXIDE TAB 400 MG (241.3 MG ELEMENTAL MG) 400 MG: 400 (241.3 MG) TAB at 08:17

## 2025-07-28 RX ADMIN — MICONAZOLE NITRATE ANTIFUNGAL POWDER: 2 POWDER TOPICAL at 08:18

## 2025-07-28 RX ADMIN — Medication 1 TABLET: at 08:18

## 2025-07-28 RX ADMIN — BUMETANIDE 2 MG: 2 TABLET ORAL at 17:54

## 2025-07-28 RX ADMIN — AMOXICILLIN 1000 MG: 250 CAPSULE ORAL at 13:08

## 2025-07-28 RX ADMIN — APIXABAN 5 MG: 5 TABLET, FILM COATED ORAL at 08:17

## 2025-07-28 RX ADMIN — POTASSIUM CHLORIDE 20 MEQ: 1500 TABLET, EXTENDED RELEASE ORAL at 08:17

## 2025-07-28 RX ADMIN — Medication 1 CAPSULE: at 21:40

## 2025-07-28 RX ADMIN — SPIRONOLACTONE 25 MG: 25 TABLET, FILM COATED ORAL at 08:18

## 2025-07-28 RX ADMIN — POTASSIUM CHLORIDE 40 MEQ: 1500 TABLET, EXTENDED RELEASE ORAL at 09:54

## 2025-07-28 RX ADMIN — MICONAZOLE NITRATE ANTIFUNGAL POWDER: 2 POWDER TOPICAL at 21:42

## 2025-07-28 RX ADMIN — AMOXICILLIN 1000 MG: 250 CAPSULE ORAL at 21:40

## 2025-07-28 RX ADMIN — ESCITALOPRAM OXALATE 10 MG: 10 TABLET ORAL at 08:17

## 2025-07-28 RX ADMIN — MAGNESIUM OXIDE TAB 400 MG (241.3 MG ELEMENTAL MG) 400 MG: 400 (241.3 MG) TAB at 13:08

## 2025-07-28 RX ADMIN — Medication 60 ML: at 09:54

## 2025-07-28 RX ADMIN — AMOXICILLIN 1000 MG: 250 CAPSULE ORAL at 05:35

## 2025-07-28 RX ADMIN — Medication 1 CAPSULE: at 08:18

## 2025-07-28 RX ADMIN — APIXABAN 5 MG: 5 TABLET, FILM COATED ORAL at 21:40

## 2025-07-28 RX ADMIN — BUMETANIDE 2 MG: 2 TABLET ORAL at 08:18

## 2025-07-28 RX ADMIN — SIMVASTATIN 10 MG: 10 TABLET, FILM COATED ORAL at 21:40

## 2025-07-28 RX ADMIN — ASPIRIN 81 MG: 81 TABLET, COATED ORAL at 08:17

## 2025-07-28 ASSESSMENT — ACTIVITIES OF DAILY LIVING (ADL)
ADLS_ACUITY_SCORE: 57
ADLS_ACUITY_SCORE: 53
ADLS_ACUITY_SCORE: 57
ADLS_ACUITY_SCORE: 53
ADLS_ACUITY_SCORE: 53
ADLS_ACUITY_SCORE: 55
ADLS_ACUITY_SCORE: 53
ADLS_ACUITY_SCORE: 57
ADLS_ACUITY_SCORE: 53
ADLS_ACUITY_SCORE: 55
ADLS_ACUITY_SCORE: 53
ADLS_ACUITY_SCORE: 55
ADLS_ACUITY_SCORE: 55
ADLS_ACUITY_SCORE: 57
ADLS_ACUITY_SCORE: 55
ADLS_ACUITY_SCORE: 57
ADLS_ACUITY_SCORE: 55
ADLS_ACUITY_SCORE: 53
ADLS_ACUITY_SCORE: 57
ADLS_ACUITY_SCORE: 53
ADLS_ACUITY_SCORE: 55

## 2025-07-28 NOTE — PROGRESS NOTES
[facility]    Medicine Progress Note - Hospitalist Service    Date of Admission:  7/22/2025    Assessment & Plan   49-year-old female with a history of multiple sclerosis not on treatment, obstructive sleep apnea, obesity hypoventilation syndrome, severe pulmonary hypertension, diastolic heart failure, lymphedema of lower extremities, pulmonary embolism. Admitted to ICU with septic shock.     Sepsis  E. fecalis bacteremia  Came to the ED where she was found to be febrile and hypotensive.  She was tachypneic and was started on BiPAP.  Admitted recently with Pseudomonas.  Blood culture grew Enterococcus faecalis  Patient was initially on Zosyn and vancomycin.  Antibiotics switched to IV ampicillin  oral amocaxillin 1000 mg PO TID started on 07/26  Urine culture is unremarkable    Acute on Chronic respiratory failure   Obesity hypoventilation syndrome  Severe pulmonary hypertension  Precipitated due to sepsis. Now back to her base line respiratory status.   ON BIPAP at night     Chronic HFpEF  Has gained 2 pounds since admission  Bumex 2 milligram oral twice daily    spironolactone 25 mg PO daily   Monitor input and output, daily weight, has lost 4 killos since admission    History of pulmonary embolism  Continue Eliquis    Hypertension  Blood pressure on the softer side  Continue to monitor    Hypokalemia  Potassium chloride 40 mEq oral daily  Potassium replacement protocol     Bilateral lower extremity lymphedema  Lymphedema wraps  A skin breakdown is noted on left lower leg anteriorly. Does not appear infected           Diet: Low Saturated Fat Na <2400 mg    DVT Prophylaxis: DOAC  Brar Catheter: Not present  Lines: PRESENT      PICC 07/22/25 Triple Lumen Left Basilic-Site Assessment: WDL      Cardiac Monitoring: None  Code Status: Full Code      Clinically Significant Risk Factors               # Hypoalbuminemia: Lowest albumin = 3.2 g/dL at 7/22/2025  8:51 PM, will monitor as appropriate    "  # Hypertension: Noted on problem list    # Chronic heart failure with preserved ejection fraction: heart failure noted on problem list and last echo with EF >50%           # Morbid Obesity: Estimated body mass index is 48.04 kg/m  as calculated from the following:    Height as of this encounter: 1.626 m (5' 4\").    Weight as of this encounter: 127 kg (279 lb 14.4 oz).        # Financial/Environmental Concerns:           Social Drivers of Health    Housing Stability: Low Risk  (7/22/2025)    Housing Stability     Do you have housing? : Yes     Are you worried about losing your housing?: No   Recent Concern: Housing Stability - High Risk (5/8/2025)    Housing Stability     Do you have housing? : No     Are you worried about losing your housing?: No   Transportation Needs: Low Risk  (7/22/2025)    Transportation Needs     Within the past 12 months, has lack of transportation kept you from medical appointments, getting your medicines, non-medical meetings or appointments, work, or from getting things that you need?: No   Recent Concern: Transportation Needs - High Risk (5/8/2025)    Transportation Needs     Within the past 12 months, has lack of transportation kept you from medical appointments, getting your medicines, non-medical meetings or appointments, work, or from getting things that you need?: Yes   Physical Activity: Inactive (3/21/2024)    Exercise Vital Sign     Days of Exercise per Week: 0 days     Minutes of Exercise per Session: 0 min   Social Connections: Unknown (3/1/2022)    Social Connection and Isolation Panel [NHANES]     Frequency of Communication with Friends and Family: Twice a week     Frequency of Social Gatherings with Friends and Family: Twice a week          Disposition Plan     Medically Ready for Discharge: Anticipated Tomorrow         Leigh Hardy MD  Hospitalist Service  [facility]  Securely message with [employer] (more info)  Text page via [employer] " Paging/Directory   ______________________________________________________________________    Interval History   Results noted.  Patient states she is feeling progressively better.  Currently on 4 L of oxygen via nasal cannula.  She uses BiPAP at night.  Working with PT on getting out of bed and getting on recliner.  Possible discharge in 1 to 2 days.     Physical Exam   Vital Signs: Temp: 97.7  F (36.5  C) Temp src: Oral BP: 109/67 Pulse: 77   Resp: 20 SpO2: 95 % O2 Device: Oxymask Oxygen Delivery: 4 LPM  Weight: 279 lbs 14.4 oz    General Appearance:  No distress noted, chronically sick looking  Respiratory: Diminished air entry bilaterally  Cardiovascular: S1-S2 heard  GI: Soft abdomen, no tenderness, normoactive bowel sound  Skin: Intact and warm  Other:  Severe bilateral lymphedema. Clean dressing over left lower LE . Doesn't appear infected        Medical Decision Making       40 MINUTES SPENT BY ME on the date of service doing chart review, history, exam, documentation & further activities per the note.      Data

## 2025-07-28 NOTE — PLAN OF CARE
"Goal Outcome Evaluation:               Patient is alert and oriented.  Wore Bipap over night.  Patient had some tenderness in left lower extremity with activity, but denied pain at rest and declined offer of pain medication.  Left leg elevated on pillow.  Patient reported able to sleep over night.  Pillows repositioned for comfort.  Interdry in place in pannus.  External catheter in place over night, exchanged at 0600.  Will continue to monitor.      Problem: Adult Inpatient Plan of Care  Goal: Plan of Care Review  Description: The Plan of Care Review/Shift note should be completed every shift.  The Outcome Evaluation is a brief statement about your assessment that the patient is improving, declining, or no change.  This information will be displayed automatically on your shift  note.  Outcome: Progressing  Goal: Patient-Specific Goal (Individualized)  Description: You can add care plan individualizations to a care plan. Examples of Individualization might be:  \"Parent requests to be called daily at 9am for status\", \"I have a hard time hearing out of my right ear\", or \"Do not touch me to wake me up as it startles  me\".  Outcome: Progressing  Goal: Absence of Hospital-Acquired Illness or Injury  Outcome: Progressing  Intervention: Identify and Manage Fall Risk  Recent Flowsheet Documentation  Taken 7/27/2025 2345 by Florina Ledesma RN  Safety Promotion/Fall Prevention:   safety round/check completed   supervised activity   room organization consistent   lighting adjusted   increase visualization of patient   increased rounding and observation   clutter free environment maintained   activity supervised  Intervention: Prevent Skin Injury  Recent Flowsheet Documentation  Taken 7/27/2025 2345 by Florina Ledesma, RN  Body Position: refuses positioning  Intervention: Prevent Infection  Recent Flowsheet Documentation  Taken 7/27/2025 2345 by Florina Ledesma, RN  Infection Prevention:   single patient room provided   " rest/sleep promoted  Goal: Optimal Comfort and Wellbeing  Outcome: Progressing  Intervention: Provide Person-Centered Care  Recent Flowsheet Documentation  Taken 7/27/2025 4125 by Florina Ledesma RN  Trust Relationship/Rapport:   care explained   choices provided  Goal: Readiness for Transition of Care  Outcome: Progressing

## 2025-07-28 NOTE — PLAN OF CARE
Goal Outcome Evaluation:         Problem: Adult Inpatient Plan of Care  Goal: Absence of Hospital-Acquired Illness or Injury  Intervention: Prevent Infection  Recent Flowsheet Documentation  Taken 7/27/2025 1545 by Fabian Ellison RN  Infection Prevention: rest/sleep promoted     Problem: Adult Inpatient Plan of Care  Goal: Absence of Hospital-Acquired Illness or Injury  Intervention: Prevent Skin Injury  Recent Flowsheet Documentation  Taken 7/27/2025 1818 by Fabian Ellison RN  Body Position: refuses positioning     Patient is A/Ox4, on 8L oxymask during day and bipap at night, and not currently getting OOB. PICC dressing changed this shift. Pt is med/surg. Primafit in place and draining. New interdry applied to abdominal and breast folds. Removed L forearm PIV after ok from Dr. Hardy since patient has 3-lumen PICC. Repositioned as patient allowed, often stated she was comfortable and didn't want to be turned.    K and Mg protocols, recheck in the am    Fabian Ellison RN

## 2025-07-28 NOTE — TELEPHONE ENCOUNTER
[facility] Call Center    Phone Message    May a detailed message be left on voicemail: yes     Reason for Call: Other: Luisa with [facility] checking on status of home care orders they have faxed twice and still needing to be signed and dated by provider. Luisa stated the last time they called, they were told provider was out of office and will be back on 07/14. They still have not received anything for order# [id number] and order# [id number]. Luisa stated she will fax the home care orders again, and please complete them and fax it to [phone]. If any questions, please call Luisa back at [phone] ext [phone] to further discuss.      Action Taken: Other: Cardiology    Travel Screening: Not Applicable     Thank you!  [facility]

## 2025-07-28 NOTE — PLAN OF CARE
Problem: Gas Exchange Impaired  Goal: Optimal Gas Exchange  Outcome: Progressing     Problem: Fluid Volume Excess  Goal: Fluid Balance  Outcome: Progressing     Problem: Pain Acute  Goal: Optimal Pain Control and Function  Outcome: Progressing   Goal Outcome Evaluation:    Patient up to the recliner w/PT. Remains on 7 LPM oxymask during the day, BiPap @ NOC. Protocols recheck in AM. Purewick changed and in place. Ordered new Interdry for breasts and abdominal folds, old ones were very gross and so washed and hung them in the bathroom.

## 2025-07-28 NOTE — PLAN OF CARE
Problem: Skin Injury Risk Increased  Goal: Skin Health and Integrity  Intervention: Optimize Skin Protection  Recent Flowsheet Documentation  Taken 7/28/2025 1556 by Barbaar Donovan RN  Head of Bed ([facility] [place]) Positioning: HOB at 30-45 degrees     Problem: Gas Exchange Impaired  Goal: Optimal Gas Exchange  Outcome: Progressing  Intervention: Optimize Oxygenation and Ventilation  Recent Flowsheet Documentation  Taken 7/28/2025 1556 by Barbara Donovan RN  Head of Bed ([facility] [place]) Positioning: HOB at 30-45 degrees     Problem: Fluid Volume Excess  Goal: Fluid Balance  Outcome: Progressing   Goal Outcome Evaluation:      Plan of Care Reviewed With: patient    Overall Patient Progress: improvingOverall Patient Progress: improving     Pt med-surg, reports pain in left leg. Repositioning and elevating leg relieves pain per pt. Pt on oxymask, O2 needs increasing to 8LPM. RT notified, pt put on BiPAP. Wears BiPAP overnight. Pt has +3-4 edema in BLE. Skin under pannus very red and excoriated, area cleaned and dried, powder and interdry applied. Wound on coccyx red with scant drainage, Mepilex applied. K and mag protocol rechecks in the AM. Pt 2-assist pivot from chair to bed. Purewick in place, call light in reach.    Barbara Donovan RN

## 2025-07-28 NOTE — TELEPHONE ENCOUNTER
Faxes received. Christin Holt CNP signed and faxed back to [facility].     Heather Morris RN

## 2025-07-29 ENCOUNTER — APPOINTMENT (OUTPATIENT)
Dept: OCCUPATIONAL THERAPY | Facility: HOSPITAL | Age: 50
End: 2025-07-29
Attending: STUDENT IN AN ORGANIZED HEALTH CARE EDUCATION/TRAINING PROGRAM
Payer: COMMERCIAL

## 2025-07-29 LAB
BASE EXCESS BLDV CALC-SCNC: 5.1 MMOL/L (ref -3–3)
HCO3 BLDV-SCNC: 31 MMOL/L (ref 21–28)
MAGNESIUM SERPL-MCNC: 2.1 MG/DL (ref 1.7–2.3)
O2/TOTAL GAS SETTING VFR VENT: 44 %
OXYHGB MFR BLDV: 47 % (ref 70–75)
PCO2 BLDV: 48 MM HG (ref 40–50)
PH BLDV: 7.42 [PH] (ref 7.32–7.43)
PO2 BLDV: 28 MM HG (ref 25–47)
POTASSIUM SERPL-SCNC: 3.6 MMOL/L (ref 3.4–5.3)
SAO2 % BLDV: 47.4 % (ref 70–75)

## 2025-07-29 PROCEDURE — 250N000013 HC RX MED GY IP 250 OP 250 PS 637: Performed by: STUDENT IN AN ORGANIZED HEALTH CARE EDUCATION/TRAINING PROGRAM

## 2025-07-29 PROCEDURE — 97165 OT EVAL LOW COMPLEX 30 MIN: CPT | Mod: GO

## 2025-07-29 PROCEDURE — 83735 ASSAY OF MAGNESIUM: CPT | Performed by: NURSE PRACTITIONER

## 2025-07-29 PROCEDURE — 999N000157 HC STATISTIC RCP TIME EA 10 MIN

## 2025-07-29 PROCEDURE — 99232 SBSQ HOSP IP/OBS MODERATE 35: CPT | Performed by: STUDENT IN AN ORGANIZED HEALTH CARE EDUCATION/TRAINING PROGRAM

## 2025-07-29 PROCEDURE — 97535 SELF CARE MNGMENT TRAINING: CPT | Mod: GO

## 2025-07-29 PROCEDURE — 84132 ASSAY OF SERUM POTASSIUM: CPT | Performed by: NURSE PRACTITIONER

## 2025-07-29 PROCEDURE — 250N000013 HC RX MED GY IP 250 OP 250 PS 637: Performed by: NURSE PRACTITIONER

## 2025-07-29 PROCEDURE — 94660 CPAP INITIATION&MGMT: CPT

## 2025-07-29 PROCEDURE — 120N000004 HC R&B MS OVERFLOW

## 2025-07-29 PROCEDURE — 82805 BLOOD GASES W/O2 SATURATION: CPT | Performed by: FAMILY MEDICINE

## 2025-07-29 RX ORDER — POTASSIUM CHLORIDE 1500 MG/1
20 TABLET, EXTENDED RELEASE ORAL ONCE
Status: COMPLETED | OUTPATIENT
Start: 2025-07-29 | End: 2025-07-29

## 2025-07-29 RX ADMIN — SPIRONOLACTONE 25 MG: 25 TABLET, FILM COATED ORAL at 08:44

## 2025-07-29 RX ADMIN — BUMETANIDE 2 MG: 2 TABLET ORAL at 18:04

## 2025-07-29 RX ADMIN — SIMVASTATIN 10 MG: 10 TABLET, FILM COATED ORAL at 20:38

## 2025-07-29 RX ADMIN — MICONAZOLE NITRATE ANTIFUNGAL POWDER: 2 POWDER TOPICAL at 08:44

## 2025-07-29 RX ADMIN — AMOXICILLIN 1000 MG: 250 CAPSULE ORAL at 05:18

## 2025-07-29 RX ADMIN — ESCITALOPRAM OXALATE 10 MG: 10 TABLET ORAL at 08:43

## 2025-07-29 RX ADMIN — Medication 1 CAPSULE: at 08:44

## 2025-07-29 RX ADMIN — POTASSIUM CHLORIDE 20 MEQ: 1500 TABLET, EXTENDED RELEASE ORAL at 08:43

## 2025-07-29 RX ADMIN — APIXABAN 5 MG: 5 TABLET, FILM COATED ORAL at 08:43

## 2025-07-29 RX ADMIN — POTASSIUM CHLORIDE 40 MEQ: 1500 TABLET, EXTENDED RELEASE ORAL at 11:50

## 2025-07-29 RX ADMIN — BUMETANIDE 2 MG: 2 TABLET ORAL at 08:44

## 2025-07-29 RX ADMIN — Medication 1 CAPSULE: at 20:38

## 2025-07-29 RX ADMIN — APIXABAN 5 MG: 5 TABLET, FILM COATED ORAL at 20:38

## 2025-07-29 RX ADMIN — MICONAZOLE NITRATE ANTIFUNGAL POWDER: 2 POWDER TOPICAL at 20:38

## 2025-07-29 RX ADMIN — Medication 1 TABLET: at 08:44

## 2025-07-29 RX ADMIN — AMOXICILLIN 1000 MG: 250 CAPSULE ORAL at 13:02

## 2025-07-29 RX ADMIN — ASPIRIN 81 MG: 81 TABLET, COATED ORAL at 08:43

## 2025-07-29 RX ADMIN — AMOXICILLIN 1000 MG: 250 CAPSULE ORAL at 21:18

## 2025-07-29 ASSESSMENT — ACTIVITIES OF DAILY LIVING (ADL)
ADLS_ACUITY_SCORE: 55

## 2025-07-29 NOTE — PLAN OF CARE
Problem: Infection  Goal: Absence of Infection Signs and Symptoms  Outcome: Progressing     Problem: Pulmonary Impairment  Goal: Effective Airway Clearance  Outcome: Progressing  Goal: Optimal Gas Exchange  Outcome: Progressing     Goal Outcome Evaluation:         Lung sounds clear to diminished. On Bipap 30% FIO2 all night. On oral antibiotic now. Vitals stable. Denied any pain. Purewick in place. Turned and repositioned.

## 2025-07-29 NOTE — PROGRESS NOTES
"   07/29/25 1300   Appointment Info   Signing Clinician's Name / Credentials (OT) Anne Erick, OTR/L   Living Environment   People in Home alone   Current Living Arrangements other (see comments)  (townhouse)   Home Accessibility no concerns  (ramp)   Transportation Anticipated health plan transportation   Living Environment Comments Pt lives in a one level [facility]. Sleeps in recliner chair and has a commode, uses walk in shower and shower chair.   Self-Care   Usual Activity Tolerance moderate   Current Activity Tolerance fair   Regular Exercise No   Equipment Currently Used at Home commode chair;grab bar, tub/shower;shower chair;walker, rolling;wheelchair, manual   Fall history within last six months yes   Number of times patient has fallen within last six months 1   Activity/Exercise/Self-Care Comment Pt independent with ADLs. Pt primarily uses a wheelchair and pivots for mobility. Pt reports she would feel comfortable ambulating 40' independently. Pt had nursing services at home. Pt has a  who comes 1x/week. Pt orders groceries and has medical transport for appointments.   General Information   Onset of Illness/Injury or Date of Surgery 07/22/25   Referring Physician Leigh Hardy MD   Patient/Family Therapy Goal Statement (OT) Get back home   Additional Occupational Profile Info/Pertinent History of Current Problem Per EMR \"49-year-old female with a history of multiple sclerosis not on treatment, obstructive sleep apnea, obesity hypoventilation syndrome, severe pulmonary hypertension, diastolic heart failure, lymphedema of lower extremities, pulmonary embolism. Admitted to ICU with septic shock. \"   Existing Precautions/Restrictions fall;oxygen therapy device and L/min  (7L currently, Pt reports at baseline she uses 3L of O2 at home)   Left Upper Extremity (Weight-bearing Status) full weight-bearing (FWB)   Right Upper Extremity (Weight-bearing Status) full weight-bearing (FWB)   Left " Lower Extremity (Weight-bearing Status) full weight-bearing (FWB)  (pt LLE very sensitive and edema present)   Right Lower Extremity (Weight-bearing Status) full weight-bearing (FWB)   Heart Disease Risk Factors Overweight;Lack of physical activity;Medical history   Cognitive Status Examination   Orientation Status orientation to person, place and time   Visual Perception   Visual Impairment/Limitations WFL   Posture   Posture forward head position   Strength Comprehensive (MMT)   Comment, General Manual Muscle Testing (MMT) Assessment generalized weakness   Coordination   Upper Extremity Coordination No deficits were identified   Bed Mobility   Bed Mobility supine-sit   Comment (Bed Mobility) mod A for LLE support   Transfers   Transfers sit-stand transfer;bed-chair transfer   Transfer Skill: Bed to Chair/Chair to Bed   Bed-Chair [place] (Transfers) contact guard   Transfer Comments CGA with FWW bed<>chair, min Verbal cues   Sit-Stand Transfer   Sit/Stand Transfer Comments CGA and verbal cues with FWW   Clinical Impression   Criteria for Skilled Therapeutic Interventions Met (OT) Yes, treatment indicated   OT Diagnosis below baseline ADLs   OT Problem List-Impairments impacting ADL problems related to;activity tolerance impaired;balance;mobility;strength;pain;postural control   Assessment of Occupational Performance 3-5 Performance Deficits   Identified Performance Deficits transfers, g/h, LB dressing   Planned Therapy Interventions (OT) ADL retraining;balance training;bed mobility training;strengthening;transfer training;home program guidelines;progressive activity/exercise;risk factor education   Clinical Decision Making Complexity (OT) problem focused assessment/low complexity   Risk & Benefits of therapy have been explained evaluation/treatment results reviewed;care plan/treatment goals reviewed;risks/benefits reviewed;current/potential barriers reviewed;participants voiced agreement with care  plan;participants included;patient   OT Total Evaluation Time   OT Eval, Low Complexity Minutes (20410) 6   OT Goals   Therapy Frequency (OT) 5 times/week   OT Predicted Duration/Target Date for Goal Attainment 08/05/25   OT Goals Hygiene/Grooming;Lower Body Dressing;Toilet Transfer/Toileting   OT: Hygiene/Grooming modified independent   OT: Lower Body Dressing Modified independent;using adaptive equipment   OT: Toilet Transfer/Toileting Modified independent   Self-Care/Home Management   Self-Care/Home Mgmt/ADL, Compensatory, Meal Prep Minutes (38076) 24   Symptoms Noted During/After Treatment (Meal Preparation/Planning Training) fatigue   Treatment Detail/Skilled Intervention OT eval complete and treatment initiated. Pt greeted supine in bed and agreeable to therapy session. Pt required mod A with LLE for sup<>sit bed mobility. Max A to don socks. Pt reports she uses loose garments with  on the bottom and can don these with her reacher at home. Pt CGA STS with FWW. Pt on 7L of O2 throughout session. Pt ambulating with CGA and FWW to recliner chair. Pt transfered to chair with CGA. Pt left seated in recliner chair with CL in reach & current needs met.   OT Discharge Planning   OT Plan standing ADLs, transfers with FWW, LB dressing with AE   OT Discharge Recommendation (DC Rec) home with assist;home   OT Rationale for DC Rec Pt is slightly below baseline and currently limited by O2 needs. Pt currently SBA/CGA with ADLs and functional transfers. Anticipate safe d/c home once medically ready.   OT Brief overview of current status SBA/CGA FWW STS and transfers   OT Total Distance Amb During Session (feet) 3   Total Session Time   Timed Code Treatment Minutes 24   Total Session Time (sum of timed and untimed services) 30

## 2025-07-29 NOTE — PROGRESS NOTES
Care Management Follow Up    Length of Stay (days): 7    Expected Discharge Date: 07/30/2025    Anticipated Discharge Plan:   home with home care    Transportation: Anticipate medical transport    PT Recommendations: (S) other (see comments) (24 hour supervision and hands on assist)  OT Recommendations:        Barriers to Discharge: medical stability    Prior Living Situation: town home with alone    Discussed  Partnership in Safe Discharge Planning  document with patient/family: No     Handoff Completed: Yes, [facility] PCP: Internal handoff referral completed    Patient/Spokesperson Updated: No    Additional Information:  SW reviewed chart. Per MD note, possible discharge home tomorrow, 7/30 with home care services through [facility] (RN, PT, OT, HHA). Pt declined TCU placement.     Next Steps: Ensure [facility] receives home care orders at discharge.     KAY Florian  Acute Care Management  [facility]  For further questions/concerns you can reach us at Vocera Web Console

## 2025-07-29 NOTE — PROGRESS NOTES
[facility]    Medicine Progress Note - Hospitalist Service    Date of Admission:  7/22/2025    Assessment & Plan   49-year-old female with a history of multiple sclerosis not on treatment, obstructive sleep apnea, obesity hypoventilation syndrome, severe pulmonary hypertension, diastolic heart failure, lymphedema of lower extremities, pulmonary embolism. Admitted to ICU with septic shock.     Sepsis  E. fecalis bacteremia  Came to the ED where she was found to be febrile and hypotensive.  She was tachypneic and was started on BiPAP.  Admitted recently with Pseudomonas.  Blood culture grew Enterococcus faecalis  Patient was initially on Zosyn and vancomycin.  Antibiotics switched to IV ampicillin  oral amocaxillin 1000 mg PO TID started on 07/26  Urine culture is unremarkable    Acute on Chronic respiratory failure   Obesity hypoventilation syndrome  Severe pulmonary hypertension  Precipitated due to sepsis. Now back to her base line respiratory status.   ON BIPAP at night     Chronic HFpEF  Has gained 2 pounds since admission  Bumex 2 milligram oral twice daily    spironolactone 25 mg PO daily   Monitor input and output, daily weight, has lost 4 killos since admission    History of pulmonary embolism  Continue Eliquis    Hypertension  Blood pressure on the softer side  Continue to monitor    Hypokalemia  Potassium chloride 40 mEq oral daily  Potassium replacement protocol     Bilateral lower extremity lymphedema  Lymphedema wraps  A skin breakdown is noted on left lower leg anteriorly. Does not appear infected           Diet: Low Saturated Fat Na <2400 mg    DVT Prophylaxis: DOAC  Brar Catheter: Not present  Lines: PRESENT      PICC 07/22/25 Triple Lumen Left Basilic-Site Assessment: WDL      Cardiac Monitoring: None  Code Status: Full Code      Clinically Significant Risk Factors               # Hypoalbuminemia: Lowest albumin = 3.2 g/dL at 7/22/2025  8:51 PM, will monitor as appropriate    "  # Hypertension: Noted on problem list  # Chronic heart failure with preserved ejection fraction: heart failure noted on problem list and last echo with EF >50%           # Morbid Obesity: Estimated body mass index is 48.54 kg/m  as calculated from the following:    Height as of this encounter: 1.626 m (5' 4\").    Weight as of this encounter: 128.3 kg (282 lb 12.8 oz).      # Financial/Environmental Concerns:           Social Drivers of Health    Housing Stability: Low Risk  (7/22/2025)    Housing Stability     Do you have housing? : Yes     Are you worried about losing your housing?: No   Recent Concern: Housing Stability - High Risk (5/8/2025)    Housing Stability     Do you have housing? : No     Are you worried about losing your housing?: No   Transportation Needs: Low Risk  (7/22/2025)    Transportation Needs     Within the past 12 months, has lack of transportation kept you from medical appointments, getting your medicines, non-medical meetings or appointments, work, or from getting things that you need?: No   Recent Concern: Transportation Needs - High Risk (5/8/2025)    Transportation Needs     Within the past 12 months, has lack of transportation kept you from medical appointments, getting your medicines, non-medical meetings or appointments, work, or from getting things that you need?: Yes   Physical Activity: Inactive (3/21/2024)    Exercise Vital Sign     Days of Exercise per Week: 0 days     Minutes of Exercise per Session: 0 min   Social Connections: Unknown (3/1/2022)    Social Connection and Isolation Panel [NHANES]     Frequency of Communication with Friends and Family: Twice a week     Frequency of Social Gatherings with Friends and Family: Twice a week          Disposition Plan     Medically Ready for Discharge: Anticipated Tomorrow         Leigh Hardy MD  Hospitalist Service  [facility]  Securely message with [employer] (more info)  Text page via Close " Paging/Directory   ______________________________________________________________________    Interval History   No distress noted.  No new complaints.  Possible discharge tomorrow a.m. to home with home care.  Management plan discussed with the patient and she expressed understanding.  She is not back to her premorbid functional status per patient    Physical Exam   Vital Signs: Temp: (!) 96.7  F (35.9  C) Temp src: Axillary BP: 110/75 Pulse: 74   Resp: 20 SpO2: 92 % O2 Device: Oxymask Oxygen Delivery: 6 LPM  Weight: 282 lbs 12.8 oz    General Appearance:  No distress noted, chronically sick looking  Respiratory: Diminished air entry bilaterally  Cardiovascular: S1-S2 heard  GI: Soft abdomen, no tenderness, normoactive bowel sound  Skin: Intact and warm  Other:  Severe bilateral lymphedema. Clean dressing over left lower LE . Doesn't appear infected     Medical Decision Making       40 MINUTES SPENT BY ME on the date of service doing chart review, history, exam, documentation & further activities per the note.      Data

## 2025-07-29 NOTE — PLAN OF CARE
Goal Outcome Evaluation:      Plan of Care Reviewed With: patient                   Patient denies pain. Wound care completed. Encouraging frequent shifting in addition to turning side-to-side for improved skin integrity. Patient requiring minimum assist of two and lb.

## 2025-07-30 VITALS
OXYGEN SATURATION: 92 % | HEIGHT: 64 IN | WEIGHT: 270 LBS | SYSTOLIC BLOOD PRESSURE: 101 MMHG | TEMPERATURE: 97.8 F | HEART RATE: 77 BPM | RESPIRATION RATE: 16 BRPM | BODY MASS INDEX: 46.1 KG/M2 | DIASTOLIC BLOOD PRESSURE: 60 MMHG

## 2025-07-30 LAB
MAGNESIUM SERPL-MCNC: 2.3 MG/DL (ref 1.7–2.3)
POTASSIUM SERPL-SCNC: 4.1 MMOL/L (ref 3.4–5.3)

## 2025-07-30 PROCEDURE — 250N000013 HC RX MED GY IP 250 OP 250 PS 637: Performed by: STUDENT IN AN ORGANIZED HEALTH CARE EDUCATION/TRAINING PROGRAM

## 2025-07-30 PROCEDURE — G0463 HOSPITAL OUTPT CLINIC VISIT: HCPCS

## 2025-07-30 PROCEDURE — 250N000013 HC RX MED GY IP 250 OP 250 PS 637: Performed by: NURSE PRACTITIONER

## 2025-07-30 PROCEDURE — 999N000157 HC STATISTIC RCP TIME EA 10 MIN

## 2025-07-30 PROCEDURE — 84132 ASSAY OF SERUM POTASSIUM: CPT | Performed by: STUDENT IN AN ORGANIZED HEALTH CARE EDUCATION/TRAINING PROGRAM

## 2025-07-30 PROCEDURE — 94660 CPAP INITIATION&MGMT: CPT

## 2025-07-30 PROCEDURE — 83735 ASSAY OF MAGNESIUM: CPT | Performed by: STUDENT IN AN ORGANIZED HEALTH CARE EDUCATION/TRAINING PROGRAM

## 2025-07-30 PROCEDURE — 99239 HOSP IP/OBS DSCHRG MGMT >30: CPT | Performed by: STUDENT IN AN ORGANIZED HEALTH CARE EDUCATION/TRAINING PROGRAM

## 2025-07-30 RX ORDER — POTASSIUM CHLORIDE 750 MG/1
40 TABLET, EXTENDED RELEASE ORAL EVERY MORNING
DISCHARGE
Start: 2025-07-30 | End: 2025-08-05

## 2025-07-30 RX ORDER — BUMETANIDE 2 MG/1
2 TABLET ORAL
DISCHARGE
Start: 2025-07-30 | End: 2025-08-05

## 2025-07-30 RX ORDER — AMOXICILLIN 500 MG/1
1000 CAPSULE ORAL EVERY 8 HOURS
Qty: 30 CAPSULE | Refills: 0 | Status: SHIPPED | OUTPATIENT
Start: 2025-07-30 | End: 2025-08-05

## 2025-07-30 RX ADMIN — ASPIRIN 81 MG: 81 TABLET, COATED ORAL at 09:13

## 2025-07-30 RX ADMIN — Medication 1 TABLET: at 09:12

## 2025-07-30 RX ADMIN — APIXABAN 5 MG: 5 TABLET, FILM COATED ORAL at 09:13

## 2025-07-30 RX ADMIN — SPIRONOLACTONE 25 MG: 25 TABLET, FILM COATED ORAL at 09:12

## 2025-07-30 RX ADMIN — POTASSIUM CHLORIDE 40 MEQ: 1500 TABLET, EXTENDED RELEASE ORAL at 09:12

## 2025-07-30 RX ADMIN — Medication 1 CAPSULE: at 09:13

## 2025-07-30 RX ADMIN — MICONAZOLE NITRATE ANTIFUNGAL POWDER: 2 POWDER TOPICAL at 09:13

## 2025-07-30 RX ADMIN — AMOXICILLIN 1000 MG: 250 CAPSULE ORAL at 13:16

## 2025-07-30 RX ADMIN — AMOXICILLIN 1000 MG: 250 CAPSULE ORAL at 06:22

## 2025-07-30 RX ADMIN — BUMETANIDE 2 MG: 2 TABLET ORAL at 09:13

## 2025-07-30 RX ADMIN — ESCITALOPRAM OXALATE 10 MG: 10 TABLET ORAL at 09:12

## 2025-07-30 ASSESSMENT — ACTIVITIES OF DAILY LIVING (ADL)
ADLS_ACUITY_SCORE: 55

## 2025-07-30 NOTE — PLAN OF CARE
Problem: Pain Acute  Goal: Optimal Pain Control and Function  Intervention: Prevent or Manage Pain  Recent Flowsheet Documentation  Taken 7/30/2025 0014 by Klarissa Caputo RN  Medication Review/Management: medications reviewed     Problem: Gas Exchange Impaired  Goal: Optimal Gas Exchange  Outcome: Progressing  Intervention: Optimize Oxygenation and Ventilation  Recent Flowsheet Documentation  Taken 7/30/2025 0400 by Klarissa Caputo RN  Head of Bed (HOB) Positioning: HOB at 30-45 degrees  Taken 7/30/2025 0014 by Klarissa Caputo RN  Head of Bed (HOB) Positioning: HOB at 30 degrees     Problem: Skin Injury Risk Increased  Goal: Skin Health and Integrity  Intervention: Plan: Nurse Driven Intervention: Moisture Management  Recent Flowsheet Documentation  Taken 7/30/2025 0014 by Klarissa Caputo RN  Moisture Interventions:   No brief in bed   Urinary collection device   Barrier ointment (CriticAid, Triad paste)   [place] textile in skin fold (InterDry)  Intervention: Plan: Nurse Driven Intervention: Friction and Shear  Recent Flowsheet Documentation  Taken 7/30/2025 0014 by Klarissa Caputo RN  Friction/Shear Interventions: HOB 30 degrees or less  Intervention: Optimize Skin Protection  Recent Flowsheet Documentation  Taken 7/30/2025 0400 by Klarissa Caputo RN  Activity Management: activity adjusted per tolerance  Head of Bed (HOB) Positioning: HOB at 30-45 degrees  Taken 7/30/2025 0014 by Klarissa Caputo RN  Activity Management: activity adjusted per tolerance  Head of Bed (HOB) Positioning: HOB at 30 degrees     Goal Outcome Evaluation:         Denied any pain. Vitals stable. Lungs mainly diminished. Bipap 30% FIO2. On oral antibiotic. Turned and repositioned. Purewick in place. Possible discharge today.

## 2025-07-30 NOTE — PROGRESS NOTES
SPIRITUAL HEALTH SERVICES ([facility])  SPIRITUAL ASSESSMENT Progress Note  [facility]. Unit P[address]    REFERRAL SOURCE: Followup      I met with Bess 1 day shy of her projected 9 day stay.  She had said when we first met that she is typically hospitalized for 5-9 days.  She was preparing for discharge so I wished her well.      PLAN: No plans to follow.      Miranda Swann, Ph.D., [facility]      Securely Message with [employer] or [email or web address] Pager.    Non-urgent needs:  Phone  to leave a message

## 2025-07-30 NOTE — PROGRESS NOTES
Physical Therapy Discharge Summary    Reason for therapy discharge:    Discharged to home with assistance, declining TCU.    Progress towards therapy goal(s). See goals on Care Plan in [facility] electronic health record for goal details.  Goals partially met.  Barriers to achieving goals:   discharge from facility.    Therapy recommendation(s):    Further recommended therapy is related to documented deficits, and is necessary to maximize functional independence in order for patient to return to prior level of function.      Liv Carye, KIN 7/30/2025

## 2025-07-30 NOTE — PROGRESS NOTES
[facility]  WO Nurse Inpatient Assessment     Consulted for: bilateral legs, bilateral feet, moisture dermatitis everywhere,buttocks purple, small open area in coccyx      Summary:   Chronic skin discoloration to buttocks and within gluteal creases from prolonged recliner chair sitting due to respiratory issues.  BLE: recommend lymphedema consult, left leg warm and red, r/o DVT  Skin folds with MASD, intertrigo, ICD from trapped moisture within folds, rash appearance  Coccyx with intertrigo, skin splitting from moisture related to intermittent incontinence    WO nurse follow-up plan: weekly    Patient History (according to provider note(s):      49-year-old female with a history of multiple sclerosis not on treatment, obstructive sleep apnea, obesity hypoventilation syndrome, severe pulmonary hypertension, diastolic heart failure, lymphedema of lower extremities, pulmonary embolism, recurrent hospitalizations for sepsis last 1 in May of this year with Pseudomonas.  She woke up today with pain in both legs.  Came to the ED where she was found to be febrile and hypotensive.  She was tachypneic and was started on BiPAP.       Assessment:      Skin Injury Location: Pannus       L pannus                                                    R pannus  Last photo: 7/30/25  Skin injury due to: Intertrigo, Irritant contact dermatitis due to friction or contact with other specified body fluids , and Moisture associated skin damage (MASD)  Skin history and plan of care:   skin splitting, rash appearance and sweat and moisture trapping within skin folds.   Affected area:      Skin assessment: Denudement, Rash, and skin splitting     Measurements (length x width x depth, in cm) R breast fold healed per patient's report - she declined assessment of that site today     R pannus fold with continued areas of denudement across an area measuring approximately 5 cm x 10 cm     L pannus fold with areas of  denudement across an area measuring approximately 3 cm x 6 cm     Color: pink and red     Temperature  normal      Drainage: scant .      Color: serosanguinous      Odor: mild  Pain: mild, tender and burning  Pain interventions prior to dressing change: patient tolerated well and slow and gentle cares   Treatment goal: Heal , Infection control/prevention, Decrease moisture, Maintain (prevention of deterioration), and Protection  STATUS: deteriorating  Supplies ordered: at bedside and discussed with patient       Skin Injury Location: coccyx and lower gluteal     Last photo: 7/23/25 7/28/25  Skin injury due to: Chronic skin discoloration, Chronic skin injury (often related to prolonged recliner use), Incontinence associated dermatitis (IAD), Intertrigo, and Irritant contact dermatitis due to fecal, urinary or dual incontinence   Skin history and plan of care:   patient has intermittent incontinence episodes. recliner chair sleeping every night per patient causing red and purple discoloration that is blanchable. Moisture trapped within gluteal fold. Coccyx with skin splitting, intertrigo. Patient at increased risk for continued skin breakdown related to immobility, incontinence,elevated moisture skin damage, current chronic skin discoloration and skin injury related to prolonged chair sitting.  Affected area:      Skin assessment: Erythema and skin splitting     Measurements (length x width x depth, in cm) open area to coccyx 3.5  x 1  x  0.2 cm      Color: dusky, pink, and red     Temperature  warm     Drainage: scant .      Color: serosanguinous      Odor: mild  Pain: mild, tender  Pain interventions prior to dressing change: patient tolerated well and slow and gentle cares   Treatment goal: Heal , Infection control/prevention, Increase granulation, Decrease moisture, Maintain (prevention of deterioration), and Protection  STATUS: stable  Supplies ordered: supplies stored on  "unit and discussed with patient      Treatment Plan:   Gluteal crease - every 3 days  Cleanse with water; gently dry.  Apply Mepilex dressing.    Skin folds: breast, groins, pannus  Interdry(order#[id number]):   1.  Wash skin gently. Pat, do not rub.  Dust ostomy powder on open area and wipe off gently,powder will stick to wet skin only.seal in place with cavillon no sting wipe.  2.  With clean scissors, cut enough fabric to cover the affected area, allowing for a minimum of 2 inches to extend beyond the skin fold for moisture evaporation.  3.  Lay a single layer of fabric in the skin fold, placing one edge into the base of the fold. Gently smooth the rest of the fabric over the skin, keeping it flat.  4.  Leave at least 2 inches of fabric exposed outside the skin fold.  5.  Secure the fabric in one of several ways: with the skin fold, with a small amount of skin-friendly tape, or tucked under clothing.  6.  Remove the fabric before bathing and reuse it when finished. When removing, gently separate the skin fold and lift away.  Helpful Hints  1. InterDry  can be written on with a ballpoint pen. It may be helpful to label each piece of InterDry  with the date you started using it.  2.  Each piece of InterDry  may be used up to 5 days, depending on fabric soiling, odor, amount of moisture and general skin condition. Replace InterDry  if it becomes soiled with blood, urine or stool.  3.  Do not use creams, ointments, or powders with InterDry  as it may interfere with product efficacy.     Pressure Injury Prevention (PIP) Plan:  If patient is declining pressure injury prevention interventions: Explore reason why and address patient's concerns, Educate on pressure injury risk and prevention intervention(s), If patient is still declining, document \"informed refusal\" , and Ensure Care team is aware ( provider, charge nurse, etc)  Mattress: Follow bed algorithm, add Low Air Loss (Air+) mattress pump if skin is very moist or " constantly moist.   HOB: Maintain at or below 30 degrees, unless contraindicated  Repositioning in bed: Every 1-2 hours , Left/right positioning; avoid supine, Raise foot of bed prior to raising head of bed, to reduce patient sliding down (shear), and Frequent microturns using positioning wedges, as patient tolerates  Heels: Keep elevated off mattress, Pillows under calves, and Heel lift boots  Protective Dressing: Sacral Mepilex for prevention (#463164),  especially for the agitated patient   Positioning Equipment:Positioning wedges (#591455) to help maintain 30 degree side lying position   Chair positioning: Chair cushion (#683062)  and Assist patient to reposition hourly   If patient has a buttock pressure injury, or high risk for PI use chair cushion or SPS.  Moisture Management: Perineal cleansing /protection: Follow Incontinence Protocol, Avoid brief in bed, Clean and dry skin folds with bathing , Consider InterDry (#251429) between folds, and Moisturize dry skin  Under Devices: Inspect skin under all medical devices during skin inspection , Ensure tubes are stabilized without tension, and Ensure patient is not lying on medical devices or equipment when repositioned  Ask provider to discontinue device when no longer needed.    Orders: Reviewed and Updated    RECOMMEND PRIMARY TEAM ORDER: Lymphedema consult  Education provided: importance of repositioning, plan of care, wound progress, Infection prevention , Moisture management, Hygiene, and Off-loading pressure  Discussed plan of care with: Patient and Nurse  Notify WOC if wound(s) deteriorate.  Nursing to notify the Provider(s) and re-consult the WOC Nurse if new skin concern.    DATA:     Current support surface: Standard  Standard gel mattress (Isoflex)  Containment of urine/stool: Incontinence Protocol, Incontinent pad in bed, and Indwelling catheter  BMI: Body mass index is 46.35 kg/m .   Active diet order: Orders Placed This Encounter      Low Saturated  Fat Na <2400 mg     Output: I/O last 3 completed shifts:  In: 620 [P.O.:590; I.V.:30]  Out: 7200 [Urine:7200]     Labs:   Recent Labs   Lab 07/28/25  0512   HGB 11.3*   WBC 5.8     Pressure injury risk assessment:   Sensory Perception: 3-->slightly limited  Moisture: 3-->occasionally moist  Activity: 2-->chairfast  Mobility: 2-->very limited  Nutrition: 3-->adequate  Friction and Shear: 1-->problem  Ben Score: 14    Lxami Stroud, MSN RN CWOCN  Pager no longer is use, please contact through [employer] group: [facility] [employer]

## 2025-07-30 NOTE — DISCHARGE SUMMARY
"[facility]  Hospitalist Discharge Summary      Date of Admission:  7/22/2025  Date of Discharge:  7/30/2025  Discharging Provider: Leigh Hardy MD  Discharge Service: Hospitalist Service    Discharge Diagnoses   Sepsis  E faecalis bacteremia  Acute on chronic respiratory failure  Obesity hypoventilation syndrome  Severe pulmonary hypertension  Chronic HFpEF  History of pulmonary embolism  Hypertension  Hypokalemia  Bilateral lower extremity severe lymphedema    Clinically Significant Risk Factors     # Morbid Obesity: Estimated body mass index is 46.35 kg/m  as calculated from the following:    Height as of this encounter: 1.626 m (5' 4\").    Weight as of this encounter: 122.5 kg (270 lb).       Follow-ups Needed After Discharge   Follow-up Appointments       Hospital Follow-up with Existing Primary Care Provider (PCP)          Schedule Primary Care visit within: 7 Days               Unresulted Labs Ordered in the Past 30 Days of this Admission       No orders found from 6/22/2025 to 7/23/2025.        These results will be followed up by     Discharge Disposition   Discharged to home  Condition at discharge: Fair    Hospital Course   49-year-old female with a history of multiple sclerosis not on treatment, obstructive sleep apnea, obesity hypoventilation syndrome, severe pulmonary hypertension, diastolic heart failure, lymphedema of lower extremities, pulmonary embolism. Admitted to ICU with septic shock.   Sepsis  E. fecalis bacteremia  Came to the ED where she was found to be febrile and hypotensive.  She was tachypneic and was started on BiPAP.  Admitted recently with Pseudomonas.  Blood culture grew Enterococcus faecalis  Patient was initially on Zosyn and vancomycin, switched to IV ampicillin  oral amocaxillin 1000 mg PO TID started on 07/26  Urine culture is unremarkable  Acute on Chronic respiratory failure   Obesity hypoventilation syndrome  Severe pulmonary " hypertension  Precipitated due to sepsis. Now back to her base line respiratory status.   ON BIPAP at night   Chronic HFpEF  Has gained 2 pounds since admission  Bumex 2 milligram oral twice daily    spironolactone 25 mg PO daily   Monitor input and output, daily weight, has lost 4 killos since admission  History of pulmonary embolism  Continue Eliquis  Hypertension  Blood pressure on the softer side  Continue to monitor  Hypokalemia  Potassium chloride 40 mEq oral daily  Potassium replacement protocol   Bilateral lower extremity lymphedema  Lymphedema wraps  A skin breakdown is noted on left lower leg anteriorly. Does not appear infected     Is clinically stable enough to be discharged back home with home care.  Medication sent to her pharmacy.    Consultations This Hospital Stay   PHARMACY TO DOSE VANCO  VASCULAR ACCESS ADULT IP CONSULT  PHARMACY TO DOSE VANCO  CARE MANAGEMENT / SOCIAL WORK IP CONSULT  WOUND OSTOMY CONTINENCE NURSE  IP CONSULT  LYMPHEDEMA THERAPY IP CONSULT  HOSPITALIST IP CONSULT  PHYSICAL THERAPY ADULT IP CONSULT  OCCUPATIONAL THERAPY PEDS IP CONSULT  OCCUPATIONAL THERAPY ADULT IP CONSULT    Code Status   Full Code    Time Spent on this Encounter   I, Leigh Hardy MD, personally saw the patient today and spent greater than 30 minutes discharging this patient.       Leigh Hardy MD  [facility]  [address] [place], [place] [id number]  Phone: [phone]  Fax: [phone]  ______________________________________________________________________    Physical Exam   Vital Signs: Temp: 97.8  F (36.6  C) Temp src: Oral BP: 101/60 Pulse: 77   Resp: 16 SpO2: 92 % O2 Device: Oxymask Oxygen Delivery: 4 LPM  Weight: 270 lbs 0 oz    General Appearance:  No distress noted, chronically sick looking  Respiratory: Diminished air entry bilaterally  Cardiovascular: S1-S2 heard  GI: Soft abdomen, no tenderness, normoactive bowel sound  Skin: Intact and  warm  Other:  Severe bilateral lymphedema. Clean dressing over left lower LE . Doesn't appear infected        Primary Care Physician   Mikala Luna    Discharge Orders      Primary Care - Care Coordination Referral      Home Care Referral      Reason for your hospital stay    Septic shock     Activity    Your activity upon discharge: activity as tolerated     CPAP - Continue Home CPAP    Continue Home CPAP per home equipment settings.     Diet    Follow this diet upon discharge: Current Diet:Orders Placed This Encounter      Low Saturated Fat Na <2400 mg     Hospital Follow-up with Existing Primary Care Provider (PCP)            Significant Results and Procedures       Discharge Medications      Review of your medicines        START taking        Dose / Directions   amoxicillin 500 MG capsule  Commonly known as: AMOXIL  Indication: Bacteria in the Blood  Used for: Bacteremia      Dose: 1,000 mg  Take 2 capsules (1,000 mg) by mouth every 8 hours for 5 days.  Quantity: 30 capsule  Refills: 0            CHANGE how you take these medications        Dose / Directions   bumetanide 2 MG tablet  Commonly known as: BUMEX  Indication: Cardiac Failure  This may have changed: how much to take  Used for: Acute on chronic congestive heart failure, unspecified heart failure type (H)      Dose: 2 mg  Take 1 tablet (2 mg) by mouth 2 times daily.  Refills: 0     potassium chloride sheila ER 10 MEQ CR tablet  Commonly known as: KLOR-CON M10  Indication: Diuretic Use  This may have changed: how much to take  Used for: Hypokalemia      Dose: 40 mEq  Take 4 tablets (40 mEq) by mouth every morning.  Refills: 0            CONTINUE these medicines which have NOT CHANGED        Dose / Directions   acetaminophen 650 MG CR tablet  Commonly known as: TYLENOL  Indication: Pain      Dose: 1,300 mg  Take 1,300 mg by mouth every 8 hours as needed for mild pain or fever.  Refills: 0     apixaban ANTICOAGULANT 5 MG tablet  Commonly known as:  ELIQUIS  Indication: DVT-PE Treatment  Used for: Acute deep vein thrombosis (DVT) of other specified vein of left lower extremity (H)      Dose: 5 mg  Take 1 tablet (5 mg) by mouth 2 times daily.  Quantity: 90 tablet  Refills: 3     aspirin 81 MG EC tablet  Indication: Heart      Dose: 81 mg  Take 81 mg by mouth every morning.  Refills: 0     escitalopram 10 MG tablet  Commonly known as: LEXAPRO  Indication: Depression; Anxiety      Dose: 10 mg  Take 10 mg by mouth every morning.  Refills: 0     Jardiance 10 MG Tabs tablet  Indication: Heart Failure  Generic drug: empagliflozin      Dose: 10 mg  Take 10 mg by mouth daily.  Refills: 0     miconazole 2 % external powder  Commonly known as: MICATIN  Indication: Skin Rash      Apply topically 2 times daily as needed for itching or other.  Refills: 0     simvastatin 10 MG tablet  Commonly known as: ZOCOR  Used for: Ischemic cerebrovascular accident (CVA) (H)      Dose: 10 mg  Take 1 tablet (10 mg) by mouth at bedtime.  Quantity: 90 tablet  Refills: 3     spironolactone 25 MG tablet  Commonly known as: ALDACTONE  Used for: Chronic heart failure with preserved ejection fraction (H)      Dose: 25 mg  Take 1 tablet (25 mg) by mouth daily.  Quantity: 90 tablet  Refills: 3               Where to get your medicines        These medications were sent to [facility] - [place], [place] - [address] [place] [id number]      Phone: [phone]   amoxicillin 500 MG capsule       Allergies   No Known Allergies

## 2025-07-30 NOTE — PROGRESS NOTES
Pt to be discharged back to home this afternoon. All wound care completed and pt was washed up this morning. PICC line removed per orders. All paperwork went over w/ pt and antibiotic filled through discharge pharmacy packed w/ all pt belongings. Pt waiting on transport to take back home later this evening.

## 2025-07-30 NOTE — PLAN OF CARE
Occupational Therapy Discharge Summary    Reason for therapy discharge:    Discharged to home with home therapy.    Progress towards therapy goal(s). See goals on Care Plan in [facility] electronic health record for goal details.  Goals partially met.  Barriers to achieving goals:   discharge from facility.    Therapy recommendation(s):    Home therapy recommended

## 2025-07-30 NOTE — PROGRESS NOTES
Care Management Discharge Note    Discharge Date: 07/30/2025       Discharge Disposition: Home    Discharge Services: Home Care    Discharge DME: None    Discharge Transportation: health plan transportation    Private pay costs discussed: transportation costs    Does the patient's insurance plan have a 3 day qualifying hospital stay waiver?  Yes     Which insurance plan 3 day waiver is available? Alternative insurance waiver    Will the waiver be used for post-acute placement? No    PAS Confirmation Code: NA  Patient/family educated on Medicare website which has current facility and service quality ratings: yes    Education Provided on the Discharge Plan: Yes  Persons Notified of Discharge Plans: patient  Patient/Family in Agreement with the Plan: yes    Handoff Referral Completed: Yes, [facility] PCP: Internal handoff referral completed    Additional Information:  Received update from bedside RN indicating patient will need medical transport arranged to get home. Met with patient at bedside to confirm address. Patient hopes to discharge around 1400, reports her neighbor will come over to help. Earliest ride available is [id number]. Transport will call back if ride becomes available sooner. Met with patient to provide update. Informed pt of out of pocket cost for transport. Discussed discharge plan. She reports she has been able to walk independently with a walk and feels comfortable with plan to return home with home care.     [facility] Care accepted for RN, PT, OT, HHA.     KAY Florian

## 2025-07-30 NOTE — PROGRESS NOTES
CLINICAL NUTRITION SERVICES - REASSESSMENT NOTE     RECOMMENDATIONS FOR MDs/PROVIDERS TO ORDER:    Registered Dietitian Interventions:  Discontinue expedite per pt preference     Future/Additional Recommendations:  Monitor po, weight, labs, I/Os, healing.      INFORMATION OBTAINED  Assessed patient in room.  Pt reports fine appetite and po intakes, consuming x2-3 meals/day. Pt denies any nausea vomiting or abd pain. Pt reports dislike for expedite and requesting RD to discontinue. Pt with no nutrition-related questions/concerns at this time.     CURRENT NUTRITION ORDERS  Diet: Low Saturated Fat/2400 mg Sodium  Snacks/Supplements: Expedite bottle      CURRENT INTAKE/TOLERANCE  7/24-29: po % x2-3 meals/day   Pt consuming 2544-3824 kcal and 66-86 g protein 7/27-29, meets 72-79% estimated kcal need and % estimated protein needs      NEW FINDINGS  GI symptoms:     BM: x1 7/29   +UOP 7.05 L 7/29   I/Os: -22 L since admit   Skin/wounds: Pressure injury medial coccyx, hip fissure, breast moisture damage, Pressure injury sacrum. Woc following   2+/3+/4+ edema generalized, wrists, hands, BLE  Nutrition-relevant labs: Reviewed  Nutrition-relevant medications: Scheduled amoxil, bumex, culturell, MVI/M, KCl, zocor, aldactone, expedite   Weight: CBW trending down from admit but remains up from PTA   Date/Time Weight Weight Method   07/30/25 0400 122.5 kg (270 lb) Bed scale   07/29/25 0416 128.3 kg (282 lb 12.8 oz) Bed scale   07/28/25 0547 127 kg (279 lb 14.4 oz) Bed scale   07/27/25 0544 134.4 kg (296 lb 4.8 oz) Bed scale   07/26/25 0324 136.3 kg (300 lb 8 oz) Abnormal  Bed scale   07/25/25 0352 138 kg (304 lb 3.2 oz) Abnormal  Bed scale   07/24/25 1718 138.2 kg (304 lb 11.2 oz) Abnormal  Bed scale   07/24/25 0400 137.5 kg (303 lb 2.1 oz) Abnormal  Bed scale   07/23/25 0618 137.2 kg (302 lb 8 oz) Abnormal  --   07/22/25 1400 136.6 kg (301 lb 3.2 oz) Abnormal  --   07/22/25 0951 131.5 kg (290 lb) --     07/01/25 118.8  kg (262 lb)   06/16/25 118.4 kg (261 lb)   05/23/25 111 kg (244 lb 12.8 oz)   05/21/25 109 kg (240 lb 6.4 oz)   05/19/25 107.4 kg (236 lb 12.8 oz)   04/15/25 116.9 kg (257 lb 12.8 oz)   04/03/25 123.4 kg (272 lb 0.8 oz)   04/01/25 115.2 kg (254 lb)   03/28/25 118.8 kg (262 lb)     ASSESSED NUTRITION NEEDS  Dosing Weight: 75.3 kg, based on adjusted wt   Estimated Energy Needs: 1890 -2260 kcals/day (25 - 30 kcals/kg)  Justification: Obese and Wound healing  Estimated Protein Needs: 76 - 91+ grams protein/day (1 - 1.2 grams of pro/kg)  Justification: Wound healing  Estimated Fluid Needs: 1890 -2260 mL/day (25 - 30 mL/kg), or per Provider  Justification: Maintenance    MALNUTRITION  Malnutrition Diagnosis: Patient does not meet two of the established criteria necessary for diagnosing malnutrition  Malnutrition Present on Admission: No    EVALUATION OF THE PROGRESS TOWARD GOALS     NUTRITION DIAGNOSIS  Increased nutrient needs related to wound healing as evidenced by pressure injuries   Evaluation: No change    INTERVENTIONS  Medical food supplement therapy  Encouraged adequate oral intakes to meet increased needs for healing     GOALS  Wound healing - Ongoing   Gradual weight loss acceptable - Progressing, CBW trending down from admit likely r/t fluid status, diuresis      MONITORING/EVALUATION  Progress toward goals will be monitored and evaluated per policy.

## 2025-07-30 NOTE — PLAN OF CARE
Problem: Adult Inpatient Plan of Care  Goal: Absence of Hospital-Acquired Illness or Injury  Intervention: Prevent Skin Injury  Recent Flowsheet Documentation  Taken 7/29/2025 2030 by Linnette Stevens RN  Body Position:   left   turned  Taken 7/29/2025 1806 by Linnette Stevens RN  Body Position: weight shifting  Taken 7/29/2025 1615 by Linnette Stevens RN  Body Position:   right   turned     Problem: Gas Exchange Impaired  Goal: Optimal Gas Exchange  Outcome: Progressing  Intervention: Optimize Oxygenation and Ventilation  Recent Flowsheet Documentation  Taken 7/29/2025 2030 by Linnette Stevens RN  Head of Bed (HOB) Positioning: HOB at 30 degrees  Taken 7/29/2025 1806 by Linnette Stevens RN  Head of Bed (HOB) Positioning: HOB at 30 degrees  Taken 7/29/2025 1615 by Linnette Stevens RN  Head of Bed (HOB) Positioning: HOB at 30 degrees   Goal Outcome Evaluation:       Pt is alert and oriented x 4, denies pain or SOB. Lung sounds clear, diminished. O2 decreased to 4 LPM per Oxymask. Pt uses O2 at 3 LPM at home. She prefers to use Oxymask than the nasal cannula. IS 1000 to 1750 ml. Wound care dressing done. Assist of 2 with repositioning, sometimes she shifted her weight independently while in bed.

## 2025-07-31 ENCOUNTER — PATIENT OUTREACH (OUTPATIENT)
Dept: CARE COORDINATION | Facility: CLINIC | Age: 50
End: 2025-07-31
Payer: COMMERCIAL

## 2025-07-31 ENCOUNTER — TELEPHONE (OUTPATIENT)
Dept: FAMILY MEDICINE | Facility: CLINIC | Age: 50
End: 2025-07-31
Payer: COMMERCIAL

## 2025-07-31 DIAGNOSIS — Z09 HOSPITAL DISCHARGE FOLLOW-UP: ICD-10-CM

## 2025-07-31 ASSESSMENT — ACTIVITIES OF DAILY LIVING (ADL): DEPENDENT_IADLS:: CLEANING;LAUNDRY;TRANSPORTATION

## 2025-07-31 NOTE — TELEPHONE ENCOUNTER
Home Care is calling regarding an established patient with [facility].  Requesting orders from: Mikala Luna  RN APPROVED: RN able to provide verbal orders.  Home Care will send orders for signature.  RN will close encounter.  Is this a request for a temporary pause in the home care episode?  No    Orders Requested    Skilled Nursing  Request for initial certification (first set of orders)   Frequency: 1 x week for 4 weeks for wound care assessment.  RN gave verbal order: Yes            Phone number Home Care can be reached at: [phone]  Okay to leave a detailed message?: Yes    Contacts       Contact Date/Time Type Contact Phone/Fax    07/31/2025 03:29 PM CDT Phone (Incoming) [facility] home care [phone]          Sondra Summers, KINDRA

## 2025-08-04 ENCOUNTER — HOSPITAL ENCOUNTER (OUTPATIENT)
Dept: ULTRASOUND IMAGING | Facility: CLINIC | Age: 50
Discharge: HOME | End: 2025-08-04
Attending: STUDENT IN AN ORGANIZED HEALTH CARE EDUCATION/TRAINING PROGRAM | Admitting: STUDENT IN AN ORGANIZED HEALTH CARE EDUCATION/TRAINING PROGRAM
Payer: COMMERCIAL

## 2025-08-04 DIAGNOSIS — K74.01 HEPATIC FIBROSIS, EARLY FIBROSIS: ICD-10-CM

## 2025-08-04 DIAGNOSIS — K76.0 HEPATIC STEATOSIS: ICD-10-CM

## 2025-08-04 DIAGNOSIS — K76.1 HEPATIC CONGESTION: ICD-10-CM

## 2025-08-04 PROCEDURE — 93976 VASCULAR STUDY: CPT

## 2025-08-05 ENCOUNTER — VIRTUAL VISIT (OUTPATIENT)
Dept: CARDIOLOGY | Facility: CLINIC | Age: 50
End: 2025-08-05
Attending: NURSE PRACTITIONER
Payer: COMMERCIAL

## 2025-08-05 VITALS — WEIGHT: 259.2 LBS | HEIGHT: 64 IN | BODY MASS INDEX: 44.25 KG/M2

## 2025-08-05 DIAGNOSIS — I50.32 CHRONIC HEART FAILURE WITH PRESERVED EJECTION FRACTION (H): Primary | ICD-10-CM

## 2025-08-05 DIAGNOSIS — G47.33 OSA ON CPAP: ICD-10-CM

## 2025-08-05 DIAGNOSIS — E87.6 HYPOKALEMIA: ICD-10-CM

## 2025-08-05 DIAGNOSIS — I89.0 LYMPHEDEMA OF BOTH LOWER EXTREMITIES: ICD-10-CM

## 2025-08-05 DIAGNOSIS — E66.2 OBESITY HYPOVENTILATION SYNDROME (H): ICD-10-CM

## 2025-08-05 DIAGNOSIS — I50.9 ACUTE ON CHRONIC CONGESTIVE HEART FAILURE, UNSPECIFIED HEART FAILURE TYPE (H): ICD-10-CM

## 2025-08-05 RX ORDER — BUMETANIDE 2 MG/1
TABLET ORAL
Qty: 360 TABLET | Refills: 3 | Status: SHIPPED | OUTPATIENT
Start: 2025-08-05

## 2025-08-05 RX ORDER — POTASSIUM CHLORIDE 750 MG/1
30 TABLET, EXTENDED RELEASE ORAL EVERY MORNING
Qty: 270 TABLET | Refills: 3 | Status: SHIPPED | OUTPATIENT
Start: 2025-08-05

## 2025-08-06 ENCOUNTER — VIRTUAL VISIT (OUTPATIENT)
Dept: FAMILY MEDICINE | Facility: CLINIC | Age: 50
End: 2025-08-06
Attending: STUDENT IN AN ORGANIZED HEALTH CARE EDUCATION/TRAINING PROGRAM
Payer: COMMERCIAL

## 2025-08-06 DIAGNOSIS — F41.1 GENERALIZED ANXIETY DISORDER: Chronic | ICD-10-CM

## 2025-08-06 DIAGNOSIS — A41.9 SEPTIC SHOCK (H): Primary | ICD-10-CM

## 2025-08-06 DIAGNOSIS — E66.2 OBESITY HYPOVENTILATION SYNDROME (H): ICD-10-CM

## 2025-08-06 DIAGNOSIS — J96.11 CHRONIC RESPIRATORY FAILURE WITH HYPOXIA (H): ICD-10-CM

## 2025-08-06 DIAGNOSIS — R65.21 SEPTIC SHOCK (H): Primary | ICD-10-CM

## 2025-08-06 DIAGNOSIS — I27.20 PULMONARY HYPERTENSION (H): ICD-10-CM

## 2025-08-06 DIAGNOSIS — F33.1 MODERATE EPISODE OF RECURRENT MAJOR DEPRESSIVE DISORDER (H): Chronic | ICD-10-CM

## 2025-08-06 RX ORDER — ESCITALOPRAM OXALATE 10 MG/1
10 TABLET ORAL EVERY MORNING
Qty: 90 TABLET | Refills: 3 | Status: SHIPPED | OUTPATIENT
Start: 2025-08-06

## 2025-08-12 ENCOUNTER — APPOINTMENT (OUTPATIENT)
Dept: CT IMAGING | Facility: CLINIC | Age: 50
End: 2025-08-12
Attending: FAMILY MEDICINE
Payer: COMMERCIAL

## 2025-08-12 ENCOUNTER — HOSPITAL ENCOUNTER (INPATIENT)
Facility: CLINIC | Age: 50
DRG: 602 | End: 2025-08-12
Attending: FAMILY MEDICINE
Payer: COMMERCIAL

## 2025-08-12 DIAGNOSIS — I50.32 CHRONIC HEART FAILURE WITH PRESERVED EJECTION FRACTION (H): ICD-10-CM

## 2025-08-12 DIAGNOSIS — L03.311 CELLULITIS OF ABDOMINAL WALL: Primary | ICD-10-CM

## 2025-08-12 DIAGNOSIS — Z86.19 HISTORY OF CLOSTRIDIOIDES DIFFICILE COLITIS: ICD-10-CM

## 2025-08-12 PROBLEM — I89.0 LYMPHEDEMA: Status: RESOLVED | Noted: 2025-02-11 | Resolved: 2025-08-12

## 2025-08-12 PROBLEM — I26.99 ACUTE PULMONARY EMBOLISM WITHOUT ACUTE COR PULMONALE, UNSPECIFIED PULMONARY EMBOLISM TYPE (H): Status: RESOLVED | Noted: 2022-02-08 | Resolved: 2025-08-12

## 2025-08-12 PROBLEM — L03.116 CELLULITIS OF LEFT LOWER EXTREMITY: Status: RESOLVED | Noted: 2024-07-19 | Resolved: 2025-08-12

## 2025-08-12 PROBLEM — N28.9 RENAL INSUFFICIENCY: Status: RESOLVED | Noted: 2022-04-06 | Resolved: 2025-08-12

## 2025-08-12 PROBLEM — L03.119 CELLULITIS OF LOWER EXTREMITY, UNSPECIFIED LATERALITY: Status: RESOLVED | Noted: 2023-12-21 | Resolved: 2025-08-12

## 2025-08-12 PROBLEM — N39.0 URINARY TRACT INFECTION: Status: RESOLVED | Noted: 2022-02-27 | Resolved: 2025-08-12

## 2025-08-12 LAB
ALBUMIN SERPL BCG-MCNC: 3.6 G/DL (ref 3.5–5.2)
ALP SERPL-CCNC: 92 U/L (ref 40–150)
ALT SERPL W P-5'-P-CCNC: 6 U/L (ref 0–50)
ANION GAP SERPL CALCULATED.3IONS-SCNC: 16 MMOL/L (ref 7–15)
AST SERPL W P-5'-P-CCNC: 19 U/L (ref 0–45)
BASE EXCESS BLDV CALC-SCNC: 3 MMOL/L (ref -3–3)
BASOPHILS # BLD AUTO: 0.1 10E3/UL (ref 0–0.2)
BASOPHILS NFR BLD AUTO: 1 %
BILIRUB SERPL-MCNC: 2.5 MG/DL
BUN SERPL-MCNC: 19.3 MG/DL (ref 6–20)
CALCIUM SERPL-MCNC: 9.2 MG/DL (ref 8.8–10.4)
CHLORIDE SERPL-SCNC: 94 MMOL/L (ref 98–107)
CREAT SERPL-MCNC: 0.91 MG/DL (ref 0.51–0.95)
CRP SERPL-MCNC: 91.15 MG/L
EGFRCR SERPLBLD CKD-EPI 2021: 76 ML/MIN/1.73M2
EOSINOPHIL # BLD AUTO: 0 10E3/UL (ref 0–0.7)
EOSINOPHIL NFR BLD AUTO: 0 %
ERYTHROCYTE [DISTWIDTH] IN BLOOD BY AUTOMATED COUNT: 16.4 % (ref 10–15)
FLUAV RNA SPEC QL NAA+PROBE: NEGATIVE
FLUBV RNA RESP QL NAA+PROBE: NEGATIVE
GLUCOSE SERPL-MCNC: 101 MG/DL (ref 70–99)
HCO3 BLDV-SCNC: 26 MMOL/L (ref 21–28)
HCO3 SERPL-SCNC: 21 MMOL/L (ref 22–29)
HCT VFR BLD AUTO: 35.8 % (ref 35–47)
HGB BLD-MCNC: 11.3 G/DL (ref 11.7–15.7)
HOLD SPECIMEN: NORMAL
IMM GRANULOCYTES # BLD: 0.1 10E3/UL
IMM GRANULOCYTES NFR BLD: 1 %
LACTATE SERPL-SCNC: 1.4 MMOL/L (ref 0.7–2)
LYMPHOCYTES # BLD AUTO: 0.7 10E3/UL (ref 0.8–5.3)
LYMPHOCYTES NFR BLD AUTO: 7 %
MAGNESIUM SERPL-MCNC: 2.5 MG/DL (ref 1.7–2.3)
MCH RBC QN AUTO: 25.5 PG (ref 26.5–33)
MCHC RBC AUTO-ENTMCNC: 31.6 G/DL (ref 31.5–36.5)
MCV RBC AUTO: 81 FL (ref 78–100)
MONOCYTES # BLD AUTO: 0.7 10E3/UL (ref 0–1.3)
MONOCYTES NFR BLD AUTO: 7 %
NEUTROPHILS # BLD AUTO: 8.9 10E3/UL (ref 1.6–8.3)
NEUTROPHILS NFR BLD AUTO: 85 %
NRBC # BLD AUTO: 0 10E3/UL
NRBC BLD AUTO-RTO: 0 /100
NT-PROBNP SERPL-MCNC: 7326 PG/ML (ref 0–192)
O2/TOTAL GAS SETTING VFR VENT: 21 %
OXYHGB MFR BLDV: 69 % (ref 70–75)
PCO2 BLDV: 35 MM HG (ref 40–50)
PH BLDV: 7.48 [PH] (ref 7.32–7.43)
PLATELET # BLD AUTO: 324 10E3/UL (ref 150–450)
PO2 BLDV: 36 MM HG (ref 25–47)
POTASSIUM SERPL-SCNC: 3.6 MMOL/L (ref 3.4–5.3)
PROT SERPL-MCNC: 7.7 G/DL (ref 6.4–8.3)
RBC # BLD AUTO: 4.44 10E6/UL (ref 3.8–5.2)
RSV RNA SPEC NAA+PROBE: NEGATIVE
SAO2 % BLDV: 71 % (ref 70–75)
SARS-COV-2 RNA RESP QL NAA+PROBE: NEGATIVE
SODIUM SERPL-SCNC: 131 MMOL/L (ref 135–145)
WBC # BLD AUTO: 10.5 10E3/UL (ref 4–11)

## 2025-08-12 PROCEDURE — 250N000013 HC RX MED GY IP 250 OP 250 PS 637

## 2025-08-12 PROCEDURE — 250N000011 HC RX IP 250 OP 636: Performed by: FAMILY MEDICINE

## 2025-08-12 PROCEDURE — 83605 ASSAY OF LACTIC ACID: CPT | Performed by: FAMILY MEDICINE

## 2025-08-12 PROCEDURE — 999N000157 HC STATISTIC RCP TIME EA 10 MIN

## 2025-08-12 PROCEDURE — 80053 COMPREHEN METABOLIC PANEL: CPT | Performed by: FAMILY MEDICINE

## 2025-08-12 PROCEDURE — 36415 COLL VENOUS BLD VENIPUNCTURE: CPT | Performed by: FAMILY MEDICINE

## 2025-08-12 PROCEDURE — 99285 EMERGENCY DEPT VISIT HI MDM: CPT | Mod: 25 | Performed by: FAMILY MEDICINE

## 2025-08-12 PROCEDURE — 87637 SARSCOV2&INF A&B&RSV AMP PRB: CPT | Performed by: FAMILY MEDICINE

## 2025-08-12 PROCEDURE — 250N000009 HC RX 250: Performed by: FAMILY MEDICINE

## 2025-08-12 PROCEDURE — 99223 1ST HOSP IP/OBS HIGH 75: CPT

## 2025-08-12 PROCEDURE — 74177 CT ABD & PELVIS W/CONTRAST: CPT

## 2025-08-12 PROCEDURE — 96366 THER/PROPH/DIAG IV INF ADDON: CPT

## 2025-08-12 PROCEDURE — 5A09357 ASSISTANCE WITH RESPIRATORY VENTILATION, LESS THAN 24 CONSECUTIVE HOURS, CONTINUOUS POSITIVE AIRWAY PRESSURE: ICD-10-PCS | Performed by: STUDENT IN AN ORGANIZED HEALTH CARE EDUCATION/TRAINING PROGRAM

## 2025-08-12 PROCEDURE — 85025 COMPLETE CBC W/AUTO DIFF WBC: CPT | Performed by: FAMILY MEDICINE

## 2025-08-12 PROCEDURE — 94660 CPAP INITIATION&MGMT: CPT

## 2025-08-12 PROCEDURE — 96365 THER/PROPH/DIAG IV INF INIT: CPT | Mod: 59

## 2025-08-12 PROCEDURE — 86140 C-REACTIVE PROTEIN: CPT | Performed by: FAMILY MEDICINE

## 2025-08-12 PROCEDURE — 83880 ASSAY OF NATRIURETIC PEPTIDE: CPT

## 2025-08-12 PROCEDURE — 120N000001 HC R&B MED SURG/OB

## 2025-08-12 PROCEDURE — 83735 ASSAY OF MAGNESIUM: CPT

## 2025-08-12 PROCEDURE — 82805 BLOOD GASES W/O2 SATURATION: CPT | Performed by: FAMILY MEDICINE

## 2025-08-12 RX ORDER — ONDANSETRON 4 MG/1
4 TABLET, ORALLY DISINTEGRATING ORAL EVERY 6 HOURS PRN
Status: DISCONTINUED | OUTPATIENT
Start: 2025-08-12 | End: 2025-08-15 | Stop reason: HOSPADM

## 2025-08-12 RX ORDER — OXYCODONE HYDROCHLORIDE 5 MG/1
5 TABLET ORAL EVERY 4 HOURS PRN
Status: DISCONTINUED | OUTPATIENT
Start: 2025-08-12 | End: 2025-08-15 | Stop reason: HOSPADM

## 2025-08-12 RX ORDER — NALOXONE HYDROCHLORIDE 0.4 MG/ML
0.2 INJECTION, SOLUTION INTRAMUSCULAR; INTRAVENOUS; SUBCUTANEOUS
Status: DISCONTINUED | OUTPATIENT
Start: 2025-08-12 | End: 2025-08-15 | Stop reason: HOSPADM

## 2025-08-12 RX ORDER — POTASSIUM CHLORIDE 750 MG/1
30 TABLET, EXTENDED RELEASE ORAL EVERY MORNING
Status: DISCONTINUED | OUTPATIENT
Start: 2025-08-13 | End: 2025-08-15 | Stop reason: HOSPADM

## 2025-08-12 RX ORDER — NALOXONE HYDROCHLORIDE 0.4 MG/ML
0.4 INJECTION, SOLUTION INTRAMUSCULAR; INTRAVENOUS; SUBCUTANEOUS
Status: DISCONTINUED | OUTPATIENT
Start: 2025-08-12 | End: 2025-08-15 | Stop reason: HOSPADM

## 2025-08-12 RX ORDER — ESCITALOPRAM OXALATE 10 MG/1
10 TABLET ORAL EVERY MORNING
Status: DISCONTINUED | OUTPATIENT
Start: 2025-08-13 | End: 2025-08-15 | Stop reason: HOSPADM

## 2025-08-12 RX ORDER — AMOXICILLIN 250 MG
2 CAPSULE ORAL 2 TIMES DAILY PRN
Status: DISCONTINUED | OUTPATIENT
Start: 2025-08-12 | End: 2025-08-15 | Stop reason: HOSPADM

## 2025-08-12 RX ORDER — LACTOBACILLUS RHAMNOSUS GG 10B CELL
1 CAPSULE ORAL DAILY
Status: DISCONTINUED | OUTPATIENT
Start: 2025-08-12 | End: 2025-08-15 | Stop reason: HOSPADM

## 2025-08-12 RX ORDER — ACETAMINOPHEN 325 MG/1
650 TABLET ORAL EVERY 4 HOURS PRN
Status: DISCONTINUED | OUTPATIENT
Start: 2025-08-12 | End: 2025-08-15 | Stop reason: HOSPADM

## 2025-08-12 RX ORDER — SIMVASTATIN 10 MG
10 TABLET ORAL AT BEDTIME
Status: DISCONTINUED | OUTPATIENT
Start: 2025-08-12 | End: 2025-08-15 | Stop reason: HOSPADM

## 2025-08-12 RX ORDER — LIDOCAINE 40 MG/G
CREAM TOPICAL
Status: DISCONTINUED | OUTPATIENT
Start: 2025-08-12 | End: 2025-08-15 | Stop reason: HOSPADM

## 2025-08-12 RX ORDER — CALCIUM CARBONATE 500 MG/1
1000 TABLET, CHEWABLE ORAL 4 TIMES DAILY PRN
Status: DISCONTINUED | OUTPATIENT
Start: 2025-08-12 | End: 2025-08-15 | Stop reason: HOSPADM

## 2025-08-12 RX ORDER — AMPICILLIN AND SULBACTAM 2; 1 G/1; G/1
3 INJECTION, POWDER, FOR SOLUTION INTRAMUSCULAR; INTRAVENOUS EVERY 6 HOURS
Status: DISCONTINUED | OUTPATIENT
Start: 2025-08-12 | End: 2025-08-14

## 2025-08-12 RX ORDER — ONDANSETRON 2 MG/ML
4 INJECTION INTRAMUSCULAR; INTRAVENOUS EVERY 6 HOURS PRN
Status: DISCONTINUED | OUTPATIENT
Start: 2025-08-12 | End: 2025-08-15 | Stop reason: HOSPADM

## 2025-08-12 RX ORDER — AMOXICILLIN 250 MG
1 CAPSULE ORAL 2 TIMES DAILY PRN
Status: DISCONTINUED | OUTPATIENT
Start: 2025-08-12 | End: 2025-08-15 | Stop reason: HOSPADM

## 2025-08-12 RX ORDER — ASPIRIN 81 MG/1
81 TABLET ORAL EVERY MORNING
Status: DISCONTINUED | OUTPATIENT
Start: 2025-08-12 | End: 2025-08-15 | Stop reason: HOSPADM

## 2025-08-12 RX ORDER — BUMETANIDE 1 MG/1
4 TABLET ORAL
Status: DISCONTINUED | OUTPATIENT
Start: 2025-08-12 | End: 2025-08-15 | Stop reason: HOSPADM

## 2025-08-12 RX ORDER — IOPAMIDOL 755 MG/ML
126 INJECTION, SOLUTION INTRAVASCULAR ONCE
Status: COMPLETED | OUTPATIENT
Start: 2025-08-12 | End: 2025-08-12

## 2025-08-12 RX ORDER — SPIRONOLACTONE 25 MG/1
25 TABLET ORAL DAILY
Status: DISCONTINUED | OUTPATIENT
Start: 2025-08-12 | End: 2025-08-13

## 2025-08-12 RX ORDER — AMPICILLIN AND SULBACTAM 2; 1 G/1; G/1
3 INJECTION, POWDER, FOR SOLUTION INTRAMUSCULAR; INTRAVENOUS ONCE
Status: COMPLETED | OUTPATIENT
Start: 2025-08-12 | End: 2025-08-12

## 2025-08-12 RX ADMIN — SPIRONOLACTONE 25 MG: 25 TABLET ORAL at 17:17

## 2025-08-12 RX ADMIN — AMPICILLIN SODIUM AND SULBACTAM SODIUM 3 G: 2; 1 INJECTION, POWDER, FOR SOLUTION INTRAMUSCULAR; INTRAVENOUS at 23:55

## 2025-08-12 RX ADMIN — AMPICILLIN SODIUM AND SULBACTAM SODIUM 3 G: 2; 1 INJECTION, POWDER, FOR SOLUTION INTRAMUSCULAR; INTRAVENOUS at 11:31

## 2025-08-12 RX ADMIN — Medication 1 CAPSULE: at 17:14

## 2025-08-12 RX ADMIN — ASPIRIN 81 MG: 81 TABLET, DELAYED RELEASE ORAL at 17:19

## 2025-08-12 RX ADMIN — IOPAMIDOL 126 ML: 755 INJECTION, SOLUTION INTRAVENOUS at 09:13

## 2025-08-12 RX ADMIN — APIXABAN 5 MG: 5 TABLET, FILM COATED ORAL at 21:10

## 2025-08-12 RX ADMIN — AMPICILLIN SODIUM AND SULBACTAM SODIUM 3 G: 2; 1 INJECTION, POWDER, FOR SOLUTION INTRAMUSCULAR; INTRAVENOUS at 17:15

## 2025-08-12 RX ADMIN — SIMVASTATIN 10 MG: 10 TABLET, FILM COATED ORAL at 21:09

## 2025-08-12 RX ADMIN — EMPAGLIFLOZIN 10 MG: 10 TABLET, FILM COATED ORAL at 17:17

## 2025-08-12 RX ADMIN — SODIUM CHLORIDE 100 ML: 9 INJECTION, SOLUTION INTRAVENOUS at 09:13

## 2025-08-12 RX ADMIN — BUMETANIDE 4 MG: 1 TABLET ORAL at 17:14

## 2025-08-12 ASSESSMENT — ACTIVITIES OF DAILY LIVING (ADL)
ADLS_ACUITY_SCORE: 61
ADLS_ACUITY_SCORE: 61
ADLS_ACUITY_SCORE: 60
ADLS_ACUITY_SCORE: 61
ADLS_ACUITY_SCORE: 61
ADLS_ACUITY_SCORE: 60
ADLS_ACUITY_SCORE: 61
ADLS_ACUITY_SCORE: 60
ADLS_ACUITY_SCORE: 61

## 2025-08-13 LAB
ANION GAP SERPL CALCULATED.3IONS-SCNC: 15 MMOL/L (ref 7–15)
BUN SERPL-MCNC: 16.2 MG/DL (ref 6–20)
CALCIUM SERPL-MCNC: 8.9 MG/DL (ref 8.8–10.4)
CHLORIDE SERPL-SCNC: 95 MMOL/L (ref 98–107)
CREAT SERPL-MCNC: 0.85 MG/DL (ref 0.51–0.95)
CRP SERPL-MCNC: 141.74 MG/L
EGFRCR SERPLBLD CKD-EPI 2021: 83 ML/MIN/1.73M2
ERYTHROCYTE [DISTWIDTH] IN BLOOD BY AUTOMATED COUNT: 16.6 % (ref 10–15)
GLUCOSE SERPL-MCNC: 89 MG/DL (ref 70–99)
HCO3 SERPL-SCNC: 23 MMOL/L (ref 22–29)
HCT VFR BLD AUTO: 32.9 % (ref 35–47)
HGB BLD-MCNC: 10.3 G/DL (ref 11.7–15.7)
MAGNESIUM SERPL-MCNC: 2.5 MG/DL (ref 1.7–2.3)
MCH RBC QN AUTO: 25.2 PG (ref 26.5–33)
MCHC RBC AUTO-ENTMCNC: 31.3 G/DL (ref 31.5–36.5)
MCV RBC AUTO: 80.4 FL (ref 78–100)
PLATELET # BLD AUTO: 289 10E3/UL (ref 150–450)
POTASSIUM SERPL-SCNC: 3.3 MMOL/L (ref 3.4–5.3)
POTASSIUM SERPL-SCNC: 3.7 MMOL/L (ref 3.4–5.3)
RBC # BLD AUTO: 4.09 10E6/UL (ref 3.8–5.2)
SODIUM SERPL-SCNC: 133 MMOL/L (ref 135–145)
WBC # BLD AUTO: 7.39 10E3/UL (ref 4–11)

## 2025-08-13 PROCEDURE — 84132 ASSAY OF SERUM POTASSIUM: CPT | Performed by: STUDENT IN AN ORGANIZED HEALTH CARE EDUCATION/TRAINING PROGRAM

## 2025-08-13 PROCEDURE — 83735 ASSAY OF MAGNESIUM: CPT | Performed by: STUDENT IN AN ORGANIZED HEALTH CARE EDUCATION/TRAINING PROGRAM

## 2025-08-13 PROCEDURE — 80048 BASIC METABOLIC PNL TOTAL CA: CPT

## 2025-08-13 PROCEDURE — 250N000011 HC RX IP 250 OP 636: Performed by: FAMILY MEDICINE

## 2025-08-13 PROCEDURE — 85027 COMPLETE CBC AUTOMATED: CPT

## 2025-08-13 PROCEDURE — 250N000013 HC RX MED GY IP 250 OP 250 PS 637

## 2025-08-13 PROCEDURE — 36415 COLL VENOUS BLD VENIPUNCTURE: CPT | Performed by: STUDENT IN AN ORGANIZED HEALTH CARE EDUCATION/TRAINING PROGRAM

## 2025-08-13 PROCEDURE — 36415 COLL VENOUS BLD VENIPUNCTURE: CPT

## 2025-08-13 PROCEDURE — 120N000001 HC R&B MED SURG/OB

## 2025-08-13 PROCEDURE — 99232 SBSQ HOSP IP/OBS MODERATE 35: CPT

## 2025-08-13 PROCEDURE — 250N000013 HC RX MED GY IP 250 OP 250 PS 637: Performed by: STUDENT IN AN ORGANIZED HEALTH CARE EDUCATION/TRAINING PROGRAM

## 2025-08-13 PROCEDURE — 94660 CPAP INITIATION&MGMT: CPT

## 2025-08-13 PROCEDURE — 86140 C-REACTIVE PROTEIN: CPT

## 2025-08-13 RX ORDER — POTASSIUM CHLORIDE 1500 MG/1
40 TABLET, EXTENDED RELEASE ORAL ONCE
Status: COMPLETED | OUTPATIENT
Start: 2025-08-13 | End: 2025-08-13

## 2025-08-13 RX ORDER — SPIRONOLACTONE 50 MG/1
50 TABLET, FILM COATED ORAL DAILY
Status: DISCONTINUED | OUTPATIENT
Start: 2025-08-13 | End: 2025-08-15 | Stop reason: HOSPADM

## 2025-08-13 RX ADMIN — AMPICILLIN SODIUM AND SULBACTAM SODIUM 3 G: 2; 1 INJECTION, POWDER, FOR SOLUTION INTRAMUSCULAR; INTRAVENOUS at 23:46

## 2025-08-13 RX ADMIN — ACETAMINOPHEN 650 MG: 325 TABLET ORAL at 03:46

## 2025-08-13 RX ADMIN — APIXABAN 5 MG: 5 TABLET, FILM COATED ORAL at 09:38

## 2025-08-13 RX ADMIN — EMPAGLIFLOZIN 10 MG: 10 TABLET, FILM COATED ORAL at 09:39

## 2025-08-13 RX ADMIN — BUMETANIDE 4 MG: 1 TABLET ORAL at 09:38

## 2025-08-13 RX ADMIN — AMPICILLIN SODIUM AND SULBACTAM SODIUM 3 G: 2; 1 INJECTION, POWDER, FOR SOLUTION INTRAMUSCULAR; INTRAVENOUS at 17:47

## 2025-08-13 RX ADMIN — SPIRONOLACTONE 50 MG: 50 TABLET ORAL at 09:39

## 2025-08-13 RX ADMIN — ESCITALOPRAM OXALATE 10 MG: 10 TABLET ORAL at 09:38

## 2025-08-13 RX ADMIN — AMPICILLIN SODIUM AND SULBACTAM SODIUM 3 G: 2; 1 INJECTION, POWDER, FOR SOLUTION INTRAMUSCULAR; INTRAVENOUS at 11:36

## 2025-08-13 RX ADMIN — ASPIRIN 81 MG: 81 TABLET, DELAYED RELEASE ORAL at 09:38

## 2025-08-13 RX ADMIN — POTASSIUM CHLORIDE 30 MEQ: 750 TABLET, EXTENDED RELEASE ORAL at 09:39

## 2025-08-13 RX ADMIN — Medication 1 CAPSULE: at 09:38

## 2025-08-13 RX ADMIN — APIXABAN 5 MG: 5 TABLET, FILM COATED ORAL at 21:20

## 2025-08-13 RX ADMIN — SIMVASTATIN 10 MG: 10 TABLET, FILM COATED ORAL at 21:20

## 2025-08-13 RX ADMIN — AMPICILLIN SODIUM AND SULBACTAM SODIUM 3 G: 2; 1 INJECTION, POWDER, FOR SOLUTION INTRAMUSCULAR; INTRAVENOUS at 05:01

## 2025-08-13 RX ADMIN — BUMETANIDE 4 MG: 1 TABLET ORAL at 15:55

## 2025-08-13 RX ADMIN — POTASSIUM CHLORIDE 40 MEQ: 1500 TABLET, EXTENDED RELEASE ORAL at 09:38

## 2025-08-13 ASSESSMENT — ACTIVITIES OF DAILY LIVING (ADL)
ADLS_ACUITY_SCORE: 61
ADLS_ACUITY_SCORE: 59
ADLS_ACUITY_SCORE: 56
ADLS_ACUITY_SCORE: 65
ADLS_ACUITY_SCORE: 65
ADLS_ACUITY_SCORE: 61
ADLS_ACUITY_SCORE: 65
ADLS_ACUITY_SCORE: 56
ADLS_ACUITY_SCORE: 59
DEPENDENT_IADLS:: CLEANING;LAUNDRY;TRANSPORTATION
ADLS_ACUITY_SCORE: 65
ADLS_ACUITY_SCORE: 61
ADLS_ACUITY_SCORE: 61
ADLS_ACUITY_SCORE: 65
ADLS_ACUITY_SCORE: 60
ADLS_ACUITY_SCORE: 61
ADLS_ACUITY_SCORE: 56
ADLS_ACUITY_SCORE: 61
ADLS_ACUITY_SCORE: 65
ADLS_ACUITY_SCORE: 61

## 2025-08-14 VITALS
HEIGHT: 64 IN | HEART RATE: 85 BPM | BODY MASS INDEX: 45.69 KG/M2 | TEMPERATURE: 97.6 F | OXYGEN SATURATION: 95 % | DIASTOLIC BLOOD PRESSURE: 76 MMHG | SYSTOLIC BLOOD PRESSURE: 122 MMHG | RESPIRATION RATE: 22 BRPM | WEIGHT: 267.64 LBS

## 2025-08-14 LAB
ANION GAP SERPL CALCULATED.3IONS-SCNC: 14 MMOL/L (ref 7–15)
BUN SERPL-MCNC: 19.6 MG/DL (ref 6–20)
CALCIUM SERPL-MCNC: 9.1 MG/DL (ref 8.8–10.4)
CHLORIDE SERPL-SCNC: 97 MMOL/L (ref 98–107)
CREAT SERPL-MCNC: 0.85 MG/DL (ref 0.51–0.95)
CRP SERPL-MCNC: 102.43 MG/L
EGFRCR SERPLBLD CKD-EPI 2021: 83 ML/MIN/1.73M2
ERYTHROCYTE [DISTWIDTH] IN BLOOD BY AUTOMATED COUNT: 16.6 % (ref 10–15)
GLUCOSE SERPL-MCNC: 97 MG/DL (ref 70–99)
HCO3 SERPL-SCNC: 24 MMOL/L (ref 22–29)
HCT VFR BLD AUTO: 35.1 % (ref 35–47)
HGB BLD-MCNC: 10.9 G/DL (ref 11.7–15.7)
MCH RBC QN AUTO: 25.3 PG (ref 26.5–33)
MCHC RBC AUTO-ENTMCNC: 31.1 G/DL (ref 31.5–36.5)
MCV RBC AUTO: 81.4 FL (ref 78–100)
PLATELET # BLD AUTO: 279 10E3/UL (ref 150–450)
POTASSIUM SERPL-SCNC: 3.2 MMOL/L (ref 3.4–5.3)
POTASSIUM SERPL-SCNC: 3.7 MMOL/L (ref 3.4–5.3)
RBC # BLD AUTO: 4.31 10E6/UL (ref 3.8–5.2)
SODIUM SERPL-SCNC: 135 MMOL/L (ref 135–145)
WBC # BLD AUTO: 5.41 10E3/UL (ref 4–11)

## 2025-08-14 PROCEDURE — 250N000013 HC RX MED GY IP 250 OP 250 PS 637

## 2025-08-14 PROCEDURE — 84132 ASSAY OF SERUM POTASSIUM: CPT | Performed by: INTERNAL MEDICINE

## 2025-08-14 PROCEDURE — 36415 COLL VENOUS BLD VENIPUNCTURE: CPT

## 2025-08-14 PROCEDURE — 36415 COLL VENOUS BLD VENIPUNCTURE: CPT | Performed by: INTERNAL MEDICINE

## 2025-08-14 PROCEDURE — 120N000001 HC R&B MED SURG/OB

## 2025-08-14 PROCEDURE — 86140 C-REACTIVE PROTEIN: CPT

## 2025-08-14 PROCEDURE — 99232 SBSQ HOSP IP/OBS MODERATE 35: CPT | Performed by: INTERNAL MEDICINE

## 2025-08-14 PROCEDURE — 250N000013 HC RX MED GY IP 250 OP 250 PS 637: Performed by: INTERNAL MEDICINE

## 2025-08-14 PROCEDURE — 85027 COMPLETE CBC AUTOMATED: CPT

## 2025-08-14 PROCEDURE — 80048 BASIC METABOLIC PNL TOTAL CA: CPT

## 2025-08-14 PROCEDURE — 250N000011 HC RX IP 250 OP 636: Performed by: FAMILY MEDICINE

## 2025-08-14 RX ORDER — VANCOMYCIN HYDROCHLORIDE 125 MG/1
125 CAPSULE ORAL DAILY
Status: DISCONTINUED | OUTPATIENT
Start: 2025-08-14 | End: 2025-08-15 | Stop reason: HOSPADM

## 2025-08-14 RX ORDER — POTASSIUM CHLORIDE 1500 MG/1
40 TABLET, EXTENDED RELEASE ORAL ONCE
Status: COMPLETED | OUTPATIENT
Start: 2025-08-14 | End: 2025-08-14

## 2025-08-14 RX ADMIN — EMPAGLIFLOZIN 10 MG: 10 TABLET, FILM COATED ORAL at 09:26

## 2025-08-14 RX ADMIN — BUMETANIDE 4 MG: 1 TABLET ORAL at 15:42

## 2025-08-14 RX ADMIN — VANCOMYCIN HYDROCHLORIDE 125 MG: 125 CAPSULE ORAL at 16:03

## 2025-08-14 RX ADMIN — AMPICILLIN SODIUM AND SULBACTAM SODIUM 3 G: 2; 1 INJECTION, POWDER, FOR SOLUTION INTRAMUSCULAR; INTRAVENOUS at 11:32

## 2025-08-14 RX ADMIN — ASPIRIN 81 MG: 81 TABLET, DELAYED RELEASE ORAL at 09:26

## 2025-08-14 RX ADMIN — POTASSIUM CHLORIDE 40 MEQ: 1500 TABLET, EXTENDED RELEASE ORAL at 09:25

## 2025-08-14 RX ADMIN — APIXABAN 5 MG: 5 TABLET, FILM COATED ORAL at 09:26

## 2025-08-14 RX ADMIN — APIXABAN 5 MG: 5 TABLET, FILM COATED ORAL at 19:34

## 2025-08-14 RX ADMIN — AMPICILLIN SODIUM AND SULBACTAM SODIUM 3 G: 2; 1 INJECTION, POWDER, FOR SOLUTION INTRAMUSCULAR; INTRAVENOUS at 05:57

## 2025-08-14 RX ADMIN — SPIRONOLACTONE 50 MG: 50 TABLET ORAL at 09:25

## 2025-08-14 RX ADMIN — POTASSIUM CHLORIDE 30 MEQ: 750 TABLET, EXTENDED RELEASE ORAL at 11:31

## 2025-08-14 RX ADMIN — ESCITALOPRAM OXALATE 10 MG: 10 TABLET ORAL at 09:25

## 2025-08-14 RX ADMIN — MICONAZOLE NITRATE: 20 POWDER TOPICAL at 09:37

## 2025-08-14 RX ADMIN — AMOXICILLIN AND CLAVULANATE POTASSIUM 1 TABLET: 875; 125 TABLET, FILM COATED ORAL at 18:09

## 2025-08-14 RX ADMIN — Medication 1 CAPSULE: at 09:26

## 2025-08-14 RX ADMIN — SIMVASTATIN 10 MG: 10 TABLET, FILM COATED ORAL at 21:36

## 2025-08-14 RX ADMIN — BUMETANIDE 4 MG: 1 TABLET ORAL at 09:25

## 2025-08-14 ASSESSMENT — ACTIVITIES OF DAILY LIVING (ADL)
ADLS_ACUITY_SCORE: 59
ADLS_ACUITY_SCORE: 61
ADLS_ACUITY_SCORE: 59
ADLS_ACUITY_SCORE: 60
ADLS_ACUITY_SCORE: 61
ADLS_ACUITY_SCORE: 60
ADLS_ACUITY_SCORE: 61
ADLS_ACUITY_SCORE: 60
ADLS_ACUITY_SCORE: 61
ADLS_ACUITY_SCORE: 60
ADLS_ACUITY_SCORE: 59
ADLS_ACUITY_SCORE: 60
ADLS_ACUITY_SCORE: 60

## 2025-08-15 VITALS
DIASTOLIC BLOOD PRESSURE: 75 MMHG | BODY MASS INDEX: 44.41 KG/M2 | HEIGHT: 64 IN | TEMPERATURE: 97.5 F | WEIGHT: 260.14 LBS | SYSTOLIC BLOOD PRESSURE: 113 MMHG | OXYGEN SATURATION: 95 % | RESPIRATION RATE: 18 BRPM | HEART RATE: 77 BPM

## 2025-08-15 LAB
HOLD SPECIMEN: NORMAL
POTASSIUM SERPL-SCNC: 3.6 MMOL/L (ref 3.4–5.3)

## 2025-08-15 PROCEDURE — 250N000013 HC RX MED GY IP 250 OP 250 PS 637: Performed by: INTERNAL MEDICINE

## 2025-08-15 PROCEDURE — 99238 HOSP IP/OBS DSCHRG MGMT 30/<: CPT | Performed by: INTERNAL MEDICINE

## 2025-08-15 PROCEDURE — 36415 COLL VENOUS BLD VENIPUNCTURE: CPT | Performed by: INTERNAL MEDICINE

## 2025-08-15 PROCEDURE — 84132 ASSAY OF SERUM POTASSIUM: CPT | Performed by: INTERNAL MEDICINE

## 2025-08-15 PROCEDURE — 250N000013 HC RX MED GY IP 250 OP 250 PS 637

## 2025-08-15 RX ORDER — SPIRONOLACTONE 25 MG/1
50 TABLET ORAL DAILY
Qty: 90 TABLET | Refills: 3 | Status: SHIPPED | OUTPATIENT
Start: 2025-08-15

## 2025-08-15 RX ORDER — VANCOMYCIN HYDROCHLORIDE 125 MG/1
125 CAPSULE ORAL DAILY
Qty: 7 CAPSULE | Refills: 0 | Status: SHIPPED | OUTPATIENT
Start: 2025-08-16

## 2025-08-15 RX ADMIN — POTASSIUM CHLORIDE 30 MEQ: 750 TABLET, EXTENDED RELEASE ORAL at 08:15

## 2025-08-15 RX ADMIN — ESCITALOPRAM OXALATE 10 MG: 10 TABLET ORAL at 08:15

## 2025-08-15 RX ADMIN — EMPAGLIFLOZIN 10 MG: 10 TABLET, FILM COATED ORAL at 08:15

## 2025-08-15 RX ADMIN — AMOXICILLIN AND CLAVULANATE POTASSIUM 1 TABLET: 875; 125 TABLET, FILM COATED ORAL at 05:51

## 2025-08-15 RX ADMIN — BUMETANIDE 4 MG: 1 TABLET ORAL at 08:14

## 2025-08-15 RX ADMIN — Medication 1 CAPSULE: at 08:15

## 2025-08-15 RX ADMIN — VANCOMYCIN HYDROCHLORIDE 125 MG: 125 CAPSULE ORAL at 08:15

## 2025-08-15 RX ADMIN — ASPIRIN 81 MG: 81 TABLET, DELAYED RELEASE ORAL at 08:15

## 2025-08-15 RX ADMIN — SPIRONOLACTONE 50 MG: 50 TABLET ORAL at 08:15

## 2025-08-15 RX ADMIN — APIXABAN 5 MG: 5 TABLET, FILM COATED ORAL at 08:15

## 2025-08-15 ASSESSMENT — ACTIVITIES OF DAILY LIVING (ADL)
ADLS_ACUITY_SCORE: 60

## 2025-08-18 ENCOUNTER — PATIENT OUTREACH (OUTPATIENT)
Dept: CARE COORDINATION | Facility: CLINIC | Age: 50
End: 2025-08-18
Payer: COMMERCIAL

## 2025-08-18 ASSESSMENT — ACTIVITIES OF DAILY LIVING (ADL): DEPENDENT_IADLS:: CLEANING;LAUNDRY;TRANSPORTATION

## 2025-08-19 ENCOUNTER — VIRTUAL VISIT (OUTPATIENT)
Dept: FAMILY MEDICINE | Facility: CLINIC | Age: 50
End: 2025-08-19
Payer: COMMERCIAL

## 2025-08-19 ENCOUNTER — TELEPHONE (OUTPATIENT)
Dept: PHARMACY | Facility: OTHER | Age: 50
End: 2025-08-19

## 2025-08-19 DIAGNOSIS — I50.33 ACUTE ON CHRONIC DIASTOLIC CONGESTIVE HEART FAILURE (H): ICD-10-CM

## 2025-08-19 DIAGNOSIS — L03.90 RECURRENT CELLULITIS: Primary | ICD-10-CM

## 2025-08-19 DIAGNOSIS — J96.11 CHRONIC RESPIRATORY FAILURE WITH HYPOXIA (H): ICD-10-CM

## 2025-08-20 ENCOUNTER — TELEPHONE (OUTPATIENT)
Dept: FAMILY MEDICINE | Facility: CLINIC | Age: 50
End: 2025-08-20
Payer: COMMERCIAL

## 2025-08-20 ENCOUNTER — PATIENT OUTREACH (OUTPATIENT)
Dept: CARE COORDINATION | Facility: CLINIC | Age: 50
End: 2025-08-20
Payer: COMMERCIAL

## 2025-08-26 ENCOUNTER — TELEPHONE (OUTPATIENT)
Dept: FAMILY MEDICINE | Facility: CLINIC | Age: 50
End: 2025-08-26
Payer: COMMERCIAL

## (undated) DEVICE — TUBING SUCTION MEDI-VAC 1/4"X20' N620A - HE

## (undated) DEVICE — BAG URINARY DRAIN 4000ML LF 153509

## (undated) DEVICE — GLOVE SURG PI ULTRA TOUCH M SZ 7-1/2 LF

## (undated) DEVICE — TUBING SET THERMEDX UROLOGY SGL USE LL0006

## (undated) DEVICE — GUIDEWIRE BENTSON FLEX TIP 0.035"X150CM M0066201250

## (undated) DEVICE — SOL WATER IRRIG 3000ML BAG 2B7117

## (undated) DEVICE — SOLUTION IRRIG 2B7127 .9NS 3000ML BAG

## (undated) DEVICE — CATH URETERAL OPEN END 6FR AXXCESS

## (undated) DEVICE — SUCTION MANIFOLD NEPTUNE 2 SYS 1 PORT 702-025-000

## (undated) DEVICE — PREP POVIDONE-IODINE 7.5% SCRUB 4OZ BOTTLE MDS093945

## (undated) DEVICE — CUSTOM PACK CYSTO PREFERRED SOT5BCYHEA

## (undated) DEVICE — KIT ENDO FIRST STEP DISINFECTANT 200ML W/POUCH EP-4

## (undated) DEVICE — CATH FOLEY 5CC 16FR SIL/LTX 0165V16S

## (undated) DEVICE — SOL WATER IRRIG 1000ML BOTTLE 2F7114

## (undated) RX ORDER — PROPOFOL 10 MG/ML
INJECTION, EMULSION INTRAVENOUS
Status: DISPENSED
Start: 2022-02-21